# Patient Record
Sex: FEMALE | Race: ASIAN | Employment: OTHER | URBAN - METROPOLITAN AREA
[De-identification: names, ages, dates, MRNs, and addresses within clinical notes are randomized per-mention and may not be internally consistent; named-entity substitution may affect disease eponyms.]

---

## 2017-01-06 ENCOUNTER — TELEPHONE (OUTPATIENT)
Dept: RHEUMATOLOGY | Age: 82
End: 2017-01-06

## 2017-01-06 NOTE — TELEPHONE ENCOUNTER
Patient informed Julia Betts has been approved by [facility] from 1/2/2017-12/31/2017. Patient states she has received the Otrexup, and Forteo from [employer] today.

## 2017-01-19 ENCOUNTER — CLINICAL SUPPORT (OUTPATIENT)
Dept: RHEUMATOLOGY | Age: 82
End: 2017-01-19

## 2017-01-19 VITALS
OXYGEN SATURATION: 99 % | HEART RATE: 85 BPM | TEMPERATURE: 96.6 F | DIASTOLIC BLOOD PRESSURE: 79 MMHG | RESPIRATION RATE: 20 BRPM | SYSTOLIC BLOOD PRESSURE: 165 MMHG

## 2017-01-19 DIAGNOSIS — M81.0 OSTEOPOROSIS: ICD-10-CM

## 2017-01-19 DIAGNOSIS — M05.79 SEROPOSITIVE RHEUMATOID ARTHRITIS OF MULTIPLE SITES (HCC): Primary | ICD-10-CM

## 2017-01-19 NOTE — PROGRESS NOTES
Patient is here with Vicenta (Caregiver) for injection teaching for [employer], and Otrexup. Caregiver , and patient given instructions for Otrexup. Caregiver gave Otrexup in right side of abdomen subcutaneously Lot #[id number] [address]  [address] [phone] [id number], Forteo given in right thigh Control # [id number] exp 7-2018 [address] [id number]. No reaction after 15 minutes at either injection site. Patient has appointment in February. Will need labs.

## 2017-02-08 ENCOUNTER — TELEPHONE (OUTPATIENT)
Dept: CARDIOLOGY CLINIC | Age: 82
End: 2017-02-08

## 2017-02-08 DIAGNOSIS — E78.00 PURE HYPERCHOLESTEROLEMIA: Primary | ICD-10-CM

## 2017-02-08 RX ORDER — EVOLOCUMAB 140 MG/ML
140 INJECTION, SOLUTION SUBCUTANEOUS
Qty: 2 PEN | Refills: 11 | Status: SHIPPED | OUTPATIENT
Start: 2017-02-08 | End: 2017-02-16

## 2017-02-08 NOTE — TELEPHONE ENCOUNTER
All patient's information and notes were sent to [address] [place] [address] with RX for [place] in January. They needed current labs so I sent patient lab slip. Since Praluent is possibly not available, sending current lab results with Repatha prescription to pharmacy. They work on approval with insurance and send to patient if approved. Otherwise they call me if there is a problem.

## 2017-02-08 NOTE — TELEPHONE ENCOUNTER
----- Message from Rosalia Merritt MD sent at 2/8/2017  9:12 AM EST -----  Lipids bad with statin intolerance, Medicare, any chance of getting approval for Repatha?

## 2017-02-16 ENCOUNTER — OFFICE VISIT (OUTPATIENT)
Dept: RHEUMATOLOGY | Age: 82
End: 2017-02-16

## 2017-02-16 VITALS
OXYGEN SATURATION: 99 % | WEIGHT: 136 LBS | DIASTOLIC BLOOD PRESSURE: 65 MMHG | BODY MASS INDEX: 25.68 KG/M2 | HEART RATE: 86 BPM | TEMPERATURE: 96.8 F | RESPIRATION RATE: 22 BRPM | SYSTOLIC BLOOD PRESSURE: 137 MMHG | HEIGHT: 61 IN

## 2017-02-16 DIAGNOSIS — E55.9 VITAMIN D DEFICIENCY: ICD-10-CM

## 2017-02-16 DIAGNOSIS — M05.79 SEROPOSITIVE RHEUMATOID ARTHRITIS OF MULTIPLE SITES (HCC): Primary | ICD-10-CM

## 2017-02-16 DIAGNOSIS — M17.0 PRIMARY OSTEOARTHRITIS OF BOTH KNEES: ICD-10-CM

## 2017-02-16 DIAGNOSIS — Z79.60 LONG-TERM USE OF IMMUNOSUPPRESSANT MEDICATION: ICD-10-CM

## 2017-02-16 DIAGNOSIS — M81.0 OSTEOPOROSIS: ICD-10-CM

## 2017-02-16 DIAGNOSIS — E79.0 ASYMPTOMATIC HYPERURICEMIA: ICD-10-CM

## 2017-02-16 RX ORDER — DICLOFENAC SODIUM 10 MG/G
2 GEL TOPICAL 4 TIMES DAILY
Qty: 3 EACH | Refills: 3 | Status: SHIPPED | OUTPATIENT
Start: 2017-02-16 | End: 2017-03-18

## 2017-02-16 NOTE — PROGRESS NOTES
REASON FOR VISIT    This is a follow-up visit for Ms. Ledesma for Seropositive Erosive Nodular Rheumatoid Arthritis and Osteoporosis. Inflammatory arthritis phenotype includes:  Anti-CCP positive: yes (71)  Rheumatoid factor positive: yes (81.8)  Erosive disease: yes  Extra-articular manifestations include: rheumatoid nodules    Quantiferon TB: negative  PPD:  Not performed    Therapy History includes:     Current DMARD therapy includes: methotrexate 25 mg subcutaneous every Wednesday  Prior DMARD therapy includes: methotrexate 25 mg subcutaneous  The following DMARDs have been ineffective: none  The following DMARDs were stopped because of side effects: none    Osteoporosis Historical Synopsis     Height loss since age 29 (at least two inches): 1.5   Fracture history includes: yes (right radius and ulna, hairline in legs, L3, T10, T12 vertebral fractures)  Family history of hip fracture: no     Daily calcium intake is 1200 mg  Daily vitamin D intake is 5000 IU     Smoking history: no  Alcohol consumption: no  Prednisone history: no     Exercise: yes (walks around the house, core exercises)     Previous work-up for osteoporosis includes the following:  DEXA Scan: 11/23/2016  Vitamin 25OH D level: 54.9  PTH: 19     Therapy History includes:     Current osteoporosis therapy includes:  Forteo  Prior osteoporosis therapy includes: Fosmax, Berta Fail   The following osteoporosis have been ineffective: none  The following osteoporosis were stopped because of side effects: none     Immunizations:   Immunization History   Administered Date(s) Administered    Influenza High Dose Vaccine PF 11/01/2016    Influenza Vaccine Split 10/15/2010, 10/01/2011    Influenza Vaccine Whole 10/01/2004    Pneumococcal Vaccine (Unspecified Type) 06/29/2006       Active problems include:    Patient Active Problem List   Diagnosis Code    DM (diabetes mellitus) ([facility] E11.9    Hypothyroid E03.9    [facility] cancer ([facility] C18.9    H/O: CVA     Microalbuminuria R80.9    Osteoporosis M81.0    Essential hypertension I10    Anemia D64.9    Hyperlipidemia E78.5    Carotid bruit R09.89    Claudication ([facility]) I73.9    Weakness of left arm M62.81    PAD (peripheral artery disease) ([facility]) I73.9    Weakness due to cerebrovascular accident ([facility] I63.9, R53.1    Neuropathy in diabetes ([facility] E11.40    Occlusion and stenosis of carotid artery without mention of cerebral infarction I65.29    Seropositive rheumatoid arthritis of multiple sites ([facility]) M05.79    Primary osteoarthritis of both knees M17.0    Long-term use of immunosuppressant medication Z79.899    Statin intolerance Z78.9    Asymptomatic hyperuricemia E79.0       HISTORY OF PRESENT ILLNESS    Ms. Yelena Jesus returns for a follow-up visit. On her last visit, I started her on Forteo and continued her methotrexate 25 mg subcutaneous every Saturday (Otrexup), with good tolerance. Today, she complains of pain and stiffness that lasts hours     Her right knee is hurting it. She had been using Volataren at bedtime, which helps her sleep at night but not more than that. Ms. Yelena Jesus has continued her medications for arthritis (methotrexate) and reports good tolerance without significant side effects. Last toxicity monitoring by blood work was done on 12/19/2016 and did not reveal any significant adverse effects, creatinine 1.03 mg/dL, eGFR 52. Most recent inflammatory markers from 12/19/2016 revealed a ESR 18 mm/hr (previously 6, 12 mm/hr) and CRP 1.5 mg/dL (previously 0.7, 1.4 mg/L). The patient has not had any interval hospital admissions, infections, or surgeries. REVIEW OF SYSTEMS    A comprehensive review of systems was performed and pertinent results are documented in the HPI, review of systems is otherwise non-contributory. PAST MEDICAL HISTORY    She has a past medical history of Anemia (9/2/2009); Colon cancer ([facility] (9/2/2009);  Colon cancer ([facility] (9/2/2009); DM (diabetes mellitus) ([facility] (9/2/2009); GERD (gastroesophageal reflux disease); H/O: CVA (9/2/2009); Hypothyroid (9/2/2009); Ill-defined condition; Microalbuminuria (9/2/2009); Osteoporosis (9/2/2009); Other and unspecified hyperlipidemia (1/27/2010); Rheumatoid arthritis involving ankle ([facility] (9/28/2016); Rheumatoid arthritis(714.0) (9/2/2009); and Unspecified essential hypertension (9/2/2009). She also has no past medical history of Abuse; Adult physical abuse; Arrhythmia; Asthma; CAD (coronary artery disease); Calculus of kidney; Chronic kidney disease; Chronic pain; Congestive heart failure, unspecified; Contact dermatitis and other eczema, due to unspecified cause; COPD; Depression; Headache(784.0); Liver disease; Psychotic disorder; PUD (peptic ulcer disease); Seizures ([facility] Stroke [facility]); Thromboembolus ([facility] Unspecified adverse effect of anesthesia; or Unspecified sleep apnea. FAMILY HISTORY    Her family history includes Cancer in her father; Diabetes in her brother, mother, sister, and sister; Other in her sister; Stroke in her mother. SOCIAL HISTORY    She reports that she has never smoked. She has never used smokeless tobacco. She reports that she does not drink alcohol or use illicit drugs. MEDICATIONS    Current Outpatient Prescriptions   Medication Sig Dispense Refill    diclofenac (VOLTAREN) 1 % gel Apply 2 g to affected area four (4) times daily for 30 days. 3 Each 3    abatacept (ORENCIA) 125 mg/mL syrg 1 mL by SubCUTAneous route every seven (7) days for 90 days. 12 Syringe 3    varicella zoster vacine live (VARICELLA-ZOSTER VACINE LIVE) 19,400 unit/0.65 mL susr injection 1 Vial by SubCUTAneous route once for 1 dose. 0.65 mL 0    methotrexate, PF, (OTREXUP, PF,) 25 mg/0.4 mL atIn 25 mg by SubCUTAneous route every seven (7) days. 4 Pen 11    teriparatide (FORTEO) 20 mcg/dose - 600 mcg/2.4 mL pnij injection 0.08 mL by SubCUTAneous route daily.  2.4 mL 6    carvedilol (COREG) 25 mg tablet Take 25 mg by mouth two (2) times a day.  potassium chloride SR (KLOR-CON 10) 10 mEq tablet Take 20 mEq by mouth daily.  amLODIPine (NORVASC) 5 mg tablet Take 5 mg by mouth daily.  MONOJECT TB SAFETY SYRINGE 1 mL 25 gauge x 5/8\" syrg Use 1 syringe with methotrexate as directed. 12 Syringe 3    levothyroxine (SYNTHROID) 112 mcg tablet Take  by mouth Daily (before breakfast).  clopidogrel (PLAVIX) 75 mg tablet Take 1 Tab by mouth daily. 90 Tab 3    chlorthalidone (HYGROTEN) 50 mg tablet Take 50 mg by mouth daily.  sitaGLIPtin (JANUVIA) 25 mg tablet Take 50 mg by mouth daily.  CINNAMON BARK (CINNAMON PO) Take 250 mg by mouth daily.  OTHER L-citrulline malate complex 750mg. Takes one po daily.  VIT C/VIT E/LUTEIN/MIN/OMEGA-3 (OCUVITE PO) Take  by mouth. Takes one po daily.  MAGNESIUM MALATE Take 200 mg by mouth daily.  MULTIVITAMIN WITH MINERALS (HAIR,SKIN & NAILS PO) Take  by mouth. Takes one po daily.  lisinopril (PRINIVIL, ZESTRIL) 40 mg tablet Take 40 mg by mouth daily.  aspirin delayed-release 81 mg tablet Take 81 mg by mouth daily.  metFORMIN (GLUCOPHAGE) 1,000 mg tablet Take 1 Tab by mouth two (2) times daily (with meals). 180 Tab 1    LECITHIN PO Take 800 mg by mouth two (2) times a day.  OMEGA-3 FATTY ACIDS/FISH OIL (OMEGA 3 FISH OIL PO) Take 300 mg by mouth daily.  ascorbate calcium (MAKAYLA-C) 500 mg Tab Take 1,000 mg by mouth daily.  CHOLECALCIFEROL, VITAMIN D3, (VITAMIN D-3 PO) Take 10,000 Units by mouth daily.  alirocumab (PRALUENT PEN) 75 mg/mL injector pen 1 mL by SubCUTAneous route Once every 2 weeks.  2 Syringe 11        ALLERGIES    Allergies   Allergen Reactions    Statins-Hmg-Coa Reductase Inhibitors Other (comments)     Intolerant to statins     Sulfa (Sulfonamide Antibiotics) Other (comments)     syncope       PHYSICAL EXAMINATION    Visit Vitals    /65 (BP 1 Location: Left arm, BP Patient Position: Sitting)    Pulse 86    Temp 96.8 °F (36 °C) (Oral)    Resp 22    Ht 5' 1\" (1.549 m)    Wt 136 lb (61.7 kg)    SpO2 99%    BMI 25.7 kg/m2     Body mass index is 25.7 kg/(m^2). General: Patient is alert, oriented x 3, not in acute distress, son at bedside    HEENT:   Sclerae are not injected and appear moist.  Oral mucous membranes are moist, there are no ulcers present. There is no alopecia. Cardiovascular:  Heart is regular rate and rhythm, no murmurs. Chest:  Lungs are clear to auscultation bilaterally. No rhonchi, wheezes, or crackles. Extremities:  Free of clubbing, cyanosis, edema    Neurological exam:  No focal sensory deficits, muscle strength is full in upper and lower extremities     Skin exam:  There are no rashes, no alopecia, no discoid lesions, no active Raynaud's, no livedo reticularis, no periungual erythema. Right thumb nodule    Musculoskeletal exam:  A comprehensive musculoskeletal exam was performed for all joints of each upper and lower extremity and assessed for swelling, tenderness and range of motion.  Positive results are documented as below:    Bilateral ankle tenderness  Bilateral MTP tenderness  Bilateral knee crepitus without effusion     Joint Count 2/16/2017 12/19/2016 11/21/2016 10/13/2016 9/28/2016   Patient pain (0-100) 60 30 50 10 5   MHAQ 2 1.38 1.63 0.63 1   Left elbow - Tender 1 - - - -   Left elbow - Swollen 1 - - - -   Left wrist- Tender - - 1 - 1   Left wrist- Swollen 1 - 1 - 1   Left 1st MCP - Tender - - 1 - -   Left 1st MCP - Swollen 1 1 1 - -   Left 2nd MCP - Tender - - 1 - -   Left 2nd MCP - Swollen - - 1 - -   Left 3rd MCP - Tender - 1 1 - 1   Left 3rd MCP - Swollen 1 1 1 - 1   Left 4th MCP - Tender - - 1 - 1   Left 4th MCP - Swollen - - 1 - 1   Left 5th MCP - Tender - - 1 - 1   Left 5th MCP - Swollen - - 1 - 1   Left thumb IP - Tender - - 1 - -   Left thumb IP - Swollen - - 1 - -   Left 2nd PIP - Tender - - 1 - -   Left 2nd PIP - Swollen - - 1 - -   Left 3rd PIP - Tender 1 - 1 - -   Left 3rd PIP - Swollen - - 1 - -   Left 4th PIP - Tender - - 1 - -   Left 5th PIP - Tender - - 1 - -   Right elbow - Tender 1 - - - -   Right elbow - Swollen 1 - - - 1   Right wrist- Tender 1 1 1 - 1   Right wrist- Swollen 1 1 1 - 1   Right 1st MCP - Tender 1 1 1 - 1   Right 1st MCP - Swollen 1 1 1 - 1   Right 2nd MCP - Tender - - 1 - 1   Right 2nd MCP - Swollen - - 1 - 1   Right 3rd MCP - Tender 1 1 1 - 1   Right 3rd MCP - Swollen 1 1 1 - 1   Right 4th MCP - Tender - 1 1 - -   Right 4th MCP - Swollen - 1 1 - -   Right 5th MCP - Tender 1 1 1 - 1   Right 5th MCP - Swollen 1 1 - - 1   Right thumb IP - Tender - 1 - - -   Right 2nd PIP - Tender 1 - 1 - -   Right 2nd PIP - Swollen 1 - 1 - -   Right 3rd PIP - Tender 1 - 1 - -   Right 4th PIP - Tender 1 - 1 - -   Right 5th PIP - Tender 1 - 1 - -   Tender Joint Count (Total) 11 7 - - -   Swollen Joint Count (Total) 10 7 - - -   Physician Assessment (0-10) 3 3 3 - 4   Patient Assessment (0-10) 5.5 6 8 0 0   CDAI Total (calculated) 29.5 23 - - -        DATA REVIEW    Laboratory     The following laboratory results were reviewed and discussed with the patient:    Telephone on 12/28/2016   Component Date Value    Cholesterol, total 02/07/2017 206*    Triglyceride 02/07/2017 209*    HDL Cholesterol 02/07/2017 36*    VLDL, calculated 02/07/2017 42*    LDL, calculated 02/07/2017 128*    INTERPRETATION 02/07/2017 Note    Office Visit on 12/19/2016   Component Date Value    PTH, Intact 12/19/2016 19     C-Reactive Protein, Qt 12/19/2016 1.5     Sed rate (ESR) 12/19/2016 18     Glucose 12/19/2016 113*    BUN 12/19/2016 25     Creatinine 12/19/2016 1.03*    GFR est non-AA 12/19/2016 52*    GFR est AA 12/19/2016 60     BUN/Creatinine ratio 12/19/2016 24     Sodium 12/19/2016 143     Potassium 12/19/2016 3.7     Chloride 12/19/2016 100     CO2 12/19/2016 26     Calcium 12/19/2016 10.5*    Protein, total 12/19/2016 7.0     Albumin 12/19/2016 4.3  GLOBULIN, TOTAL 12/19/2016 2.7     A-G Ratio 12/19/2016 1.6     Bilirubin, total 12/19/2016 0.6     Alk. phosphatase 12/19/2016 86     AST (SGOT) 12/19/2016 24     ALT (SGPT) 12/19/2016 31     WBC 12/19/2016 8.6     RBC 12/19/2016 3.78     HGB 12/19/2016 11.3     HCT 12/19/2016 34.6     MCV 12/19/2016 92     MCH 12/19/2016 29.9     MCHC 12/19/2016 32.7     RDW 12/19/2016 16.8*    PLATELET 69/99/5592 407     NEUTROPHILS 12/19/2016 60     Lymphocytes 12/19/2016 25     MONOCYTES 12/19/2016 12     EOSINOPHILS 12/19/2016 2     BASOPHILS 12/19/2016 1     ABS. NEUTROPHILS 12/19/2016 5.2     Abs Lymphocytes 12/19/2016 2.1     ABS. MONOCYTES 12/19/2016 1.0*    ABS. EOSINOPHILS 12/19/2016 0.2     ABS. BASOPHILS 12/19/2016 0.0     IMMATURE GRANULOCYTES 12/19/2016 0     ABS. IMM. GRANS. 12/19/2016 0.0     Hematology comments: 12/19/2016 Note:    Office Visit on 11/21/2016   Component Date Value    WBC 11/21/2016 7.2     RBC 11/21/2016 4.12     HGB 11/21/2016 12.1     HCT 11/21/2016 36.3     MCV 11/21/2016 88     [address] 11/21/2016 29.4     MCHC 11/21/2016 33.3     RDW 11/21/2016 16.8*    PLATELET 75/89/0898 740     NEUTROPHILS 11/21/2016 50     Lymphocytes 11/21/2016 36     MONOCYTES 11/21/2016 10     EOSINOPHILS 11/21/2016 2     BASOPHILS 11/21/2016 1     ABS. NEUTROPHILS 11/21/2016 3.6     Abs Lymphocytes 11/21/2016 2.6     ABS. MONOCYTES 11/21/2016 0.8     ABS. EOSINOPHILS 11/21/2016 0.1     ABS. BASOPHILS 11/21/2016 0.1     IMMATURE GRANULOCYTES 11/21/2016 1     ABS. IMM.  GRANS. 11/21/2016 0.1     Glucose 11/21/2016 133*    BUN 11/21/2016 22     Creatinine 11/21/2016 0.97     GFR est non-AA 11/21/2016 56*    GFR est AA 11/21/2016 64     BUN/Creatinine ratio 11/21/2016 23     Sodium 11/21/2016 138     Potassium 11/21/2016 3.9     Chloride 11/21/2016 96*    CO2 11/21/2016 25     Calcium 11/21/2016 10.4*    Protein, total 11/21/2016 7.1     Albumin 11/21/2016 4.4  GLOBULIN, TOTAL 11/21/2016 2.7     A-G Ratio 11/21/2016 1.6     Bilirubin, total 11/21/2016 0.5     Alk. phosphatase 11/21/2016 91     AST (SGOT) 11/21/2016 24     ALT (SGPT) 11/21/2016 33*    Sed rate (ESR) 11/21/2016 6     C-Reactive Protein, Qt 11/21/2016 0.7        Imaging    Musculoskeletal Ultrasound    None    Radiographs    Chest 9/28/2016: normal heart size. Is atherosclerotic change of the aorta. The lungs are clear acute process. There is calcified granuloma in the right upper lobe with calcified right hilar lymph nodes. There is moderate compression deformity of approximately the T12 vertebral body that appears chronic. There are degenerative changes of the thoracic spine. Bilateral Hand 9/28/2016: LEFT: Bones are osteopenic. No acute fracture or dislocation. Moderate radiocarpal joint osteoarthritis. Age-appropriate intercarpal and first [facility] osteoarthritis. There are erosions at the third MCP joint. No other erosion. No other joint space narrowing. No soft tissue calcification.  RIGHT: No acute fracture. Old radius intra-articular fracture has healed. Increased now moderate radiocarpal joint osteoarthritis. Increased mild first MCP joint osteoarthritis. Increased mild-moderate first IP joint osteoarthritis. No joint space erosion or periosteal reaction. Alignment is within normal limits. Osteopenia is unchanged. No soft tissue calcification. Bilateral Foot 9/28/2016: LEFT: Bones are osteopenic. No acute fracture or dislocation. No erosion. Age-appropriate bilateral first MTP joint osteoarthritis. Minimal intertarsal osteoarthritis for age. No periosteal reaction or soft tissue calcification. RIGHT: Bones are osteopenic. No acute fracture or dislocation. No erosion. Age-appropriate bilateral first MTP joint osteoarthritis. Minimal intertarsal osteoarthritis for age. No periosteal reaction or soft tissue calcification.     Right Hand 4/10/2010: showed demonstrate osteopenia. Mary Bolton is an acute T-shaped intra-articular distal radial fracture. Ulna styloid peri like avulsion is also noted. There is soft tissue swelling    CT Imaging    None    MR Imaging    None    DXA     DXA 11/23/2016: (L3 for compression fracture and L4 for spondylitic change) lumbar spine L1-L2 T score -1.7 (BMD 0.971 g/cm2), right femoral neck T score: -2.0 (0.757 g/cm2), righttotal hip T score: -1.7 (0.791 g/cm2), and distal one third left radius T score -4.6 (0.472 g/cm2). FRAX score 33.2 % probability in 10 years for major osteoporotic fracture and 21 % 10 year probability of hip fracture. ASSESSMENT AND PLAN    This is a follow-up visit for Ms. Ledesma. 1) Seropositive Erosive Nodular Rheumatoid Arthritis. She has tolerated methotrexate (Otrexup) 25 mg every Wednesday well without adverse effects and has continued folic acid. Her CDAI on was 29.5 (previously 23, 47, 23) with 11 tender and 10 swollen joints. I discussed with her advancing therapy to the biologic (small particle, anti-TNF, anti-CTL4A). We discussed the potential adverse effects, which include infections, and routes of administration (oral versus infusion versus subcutaneous). The patient was informed these medications co-pay are subject to the patient's insurance coverage and we will not know until it has been submitted to the insurance company. She preferred Orencia subcutaneously. A prior authorization will be submitted today. 2) Long Term Use of Immunosuppressants. The patient remains on immunomodulatory medications (methotrexate) and requires frequent toxicity monitoring by blood work. Respective labs were ordered (CBC and CMP). 3) Osteoporosis with Multiple Pathologic Fractures. She is taking calcium 1200 mg and 5000 UI of vitamin D daily. She is on Forteo with good tolerance.    4) Bilateral Knee Osteoarthritis. This continues to be an active issue. I asked her to applied diclofenac gel at least 3 times daily. 5) Abnormal Gait.  She is dependent on a walker to ambulate. There are no recent falls. 6) Asymptomatic Hyperuricemia. Her uric acid was 7.2 mg/dL. There is no history of gout. I will repeat it today. bI gave her a script for the Zostavax. The patient voiced understanding of the aforementioned assessment and plan. Summary of plan was provided in the After Visit Summary patient instructions.      TODAY'S ORDERS    Orders Placed This Encounter    CBC WITH AUTOMATED DIFF    METABOLIC PANEL, COMPREHENSIVE    C REACTIVE PROTEIN, QT    SED RATE (ESR)    VITAMIN D, 25 HYDROXY    URIC ACID    diclofenac (VOLTAREN) 1 % gel    abatacept (ORENCIA) 125 mg/mL syrg    varicella zoster vacine live (VARICELLA-ZOSTER VACINE LIVE) 19,400 unit/0.65 mL susr injection     Future Appointments  Date Time Provider Prasanna Diana   2/17/2017 11:00 AM VASCULAR, [place] [address]   5/12/2017 11:40 AM MD Dez Paulino MD, [address] [facility]    Adult Rheumatology   Musculoskeletal Ultrasound Certified  [address] [place] [address]   [id number] [place] [address]   Phone [phone]  Fax [phone]

## 2017-02-16 NOTE — MR AVS SNAPSHOT
Visit Information Date & Time Provider Department Dept. Phone Encounter #  
 2/16/2017  3:40 PM Mari Barkley MD [facility] [id number] Follow-up Instructions Return in about 3 months (around 5/16/2017). Your Appointments 2/17/2017 11:00 AM  
VASCULAR TEST with [facility] ([facility] SCHEDULING) Appt Note: Per Dr. Calvin Chris Carotid Duplex dx carotid disease, hx stroke [address]  [address] [place] [address]  
[address] [id number] Upcoming Health Maintenance Date Due  
 FOOT EXAM Q1 2/21/1945 EYE EXAM RETINAL OR DILATED Q1 2/21/1945 DTaP/Tdap/Td series (1 - Tdap) 2/21/1958 ZOSTER VACCINE AGE 60> 2/21/1997 GLAUCOMA SCREENING Q2Y 2/21/2002 MEDICARE YEARLY EXAM 2/21/2002 Pneumococcal 65+ High/Highest Risk (2 of 2 - PPSV23) 6/29/2011 BREAST CANCER SCRN MAMMOGRAM 5/19/2012 HEMOGLOBIN A1C Q6M 2/5/2014 COLONOSCOPY 4/20/2016 MICROALBUMIN Q1 3/17/2017 LIPID PANEL Q1 2/7/2018 Allergies as of 2/16/2017  Review Complete On: 2/16/2017 By: Mari Barkley MD  
  
 Severity Noted Reaction Type Reactions Statins-hmg-coa Reductase Inhibitors  12/21/2016    Other (comments) Intolerant to statins Sulfa (Sulfonamide Antibiotics)  09/02/2009    Other (comments)  
 syncope Current Immunizations  Reviewed on 11/21/2016 Name Date Influenza High Dose Vaccine PF 11/1/2016 Influenza Vaccine Split 10/1/2011, 10/15/2010 Influenza Vaccine Whole 10/1/2004 Pneumococcal Vaccine (Unspecified Type) 6/29/2006 Not reviewed this visit You Were Diagnosed With   
  
 Codes Comments Seropositive rheumatoid arthritis of multiple sites [facility])    -  Primary ICD-10-CM: M05.79 ICD-9-CM: 714.0 Long-term use of immunosuppressant medication     ICD-10-CM: Z79.899 ICD-9-CM: V58.69 Primary osteoarthritis of both knees     ICD-10-CM: M17.0 ICD-9-CM: 715.16 Asymptomatic hyperuricemia     ICD-10-CM: E79.0 ICD-9-CM: 790.6 Osteoporosis     ICD-10-CM: M81.0 ICD-9-CM: 733.00 Vitamin D deficiency     ICD-10-CM: E55.9 ICD-9-CM: 268.9 Vitals BP Pulse Temp Resp Height(growth percentile) Weight(growth percentile)  
 137/65 (BP 1 Location: Left arm, BP Patient Position: Sitting) 86 96.8 °F (36 °C) (Oral) 22 5' 1\" (1.549 m) 136 lb (61.7 kg) SpO2 BMI OB Status Smoking Status 99% 25.7 kg/m2 Hysterectomy Never Smoker Vitals History BMI and BSA Data Body Mass Index Body Surface Area 25.7 kg/m 2 1.63 m 2 Preferred Pharmacy Pharmacy Name Phone [facility] [address] [place] [address] [phone] Your Updated Medication List  
  
   
This list is accurate as of: 17  4:26 PM.  Always use your most recent med list.  
  
  
  
  
 abatacept 125 mg/mL Syrg Commonly known as:  Izell Apgar 1 mL by SubCUTAneous route every seven (7) days for 90 days. alirocumab 75 mg/mL injector pen Commonly known as:  PRALUENT PEN  
1 mL by SubCUTAneous route Once every 2 weeks. amLODIPine 5 mg tablet Commonly known as:  Arlyss [place] Take 5 mg by mouth daily. aspirin delayed-release 81 mg tablet Take 81 mg by mouth daily. carvedilol 25 mg tablet Commonly known as:  Watkins Servant Take 25 mg by mouth two (2) times a day. chlorthalidone 50 mg tablet Commonly known as:  Cecilia [place] Take 50 mg by mouth daily. CINNAMON PO Take 250 mg by mouth daily. clopidogrel 75 mg Tab Commonly known as:  PLAVIX Take 1 Tab by mouth daily. diclofenac 1 % Gel Commonly known as:  VOLTAREN Apply 2 g to affected area four (4) times daily for 30 days. MAKAYLA-C 500 mg Tab Generic drug:  ascorbate calcium Take 1,000 mg by mouth daily.   
  
 HAIR,SKIN & NAILS PO  
 Take  by mouth. Takes one po daily. JANUVIA 25 mg tablet Generic drug:  SITagliptin Take 50 mg by mouth daily. LECITHIN PO Take 800 mg by mouth two (2) times a day. levothyroxine 112 mcg tablet Commonly known as:  SYNTHROID Take  by mouth Daily (before breakfast). lisinopril 40 mg tablet Commonly known as:  Marilynne Shows Take 40 mg by mouth daily. MAGNESIUM MALATE Take 200 mg by mouth daily. metFORMIN 1,000 mg tablet Commonly known as:  GLUCOPHAGE Take 1 Tab by mouth two (2) times daily (with meals). methotrexate (PF) 25 mg/0.4 mL Atin Commonly known as:  OTREXUP (PF)  
25 mg by SubCUTAneous route every seven (7) days. MONOJECT TB SAFETY SYRINGE 1 mL 25 gauge x 5/8\" Syrg Generic drug:  Syringe with Needle (Disp) Use 1 syringe with methotrexate as directed. OCUVITE PO Take  by mouth. Takes one po daily. OMEGA 3 FISH OIL PO Take 300 mg by mouth daily. OTHER  
L-citrulline malate complex 750mg. Takes one po daily. potassium chloride SR 10 mEq tablet Commonly known as:  KLOR-CON 10 Take 20 mEq by mouth daily. teriparatide 20 mcg/dose - 600 mcg/2.4 mL Pnij injection Commonly known as:  FORTEO  
0.08 mL by SubCUTAneous route daily. VITAMIN D3 PO Take 10,000 Units by mouth daily. Prescriptions Sent to Pharmacy Refills  
 diclofenac (VOLTAREN) 1 % gel 3 Sig: Apply 2 g to affected area four (4) times daily for 30 days. Class: Normal  
 Pharmacy: [address] Ph #: [phone] Route: Topical  
 abatacept (ORENCIA) 125 mg/mL syrg 3 Si mL by SubCUTAneous route every seven (7) days for 90 days. Class: Normal  
 Pharmacy: [address] Ph #: [phone] Route: SubCUTAneous We Performed the Following C REACTIVE PROTEIN, QT [20836 CPT(R)] CBC WITH AUTOMATED DIFF [02679 CPT(R)] METABOLIC PANEL, COMPREHENSIVE [03468 CPT(R)] SED RATE (ESR) [id number] CPT(R)] URIC ACID [id number] CPT(R)] VITAMIN D, 25 HYDROXY [id number] CPT(R)] Follow-up Instructions Return in about 3 months (around 5/16/2017). Patient Instructions Labs today I will start you on Orencia injections Introducing [facility]! Corey Calero introduces [employer] patient portal. Now you can access parts of your medical record, email your doctor's office, and request medication refills online. 1. In your internet browser, go to [email or web address] 2. Click on the First Time User? Click Here link in the Sign In box. You will see the New Member Sign Up page. 3. Enter your [employer] Access Code exactly as it appears below. You will not need to use this code after youve completed the sign-up process. If you do not sign up before the expiration date, you must request a new code. · [employer] Access Code: [email or web address] Expires: 4/19/2017  4:29 PM 
 
4. Enter the last four digits of your Social Security Number (xxxx) and Date of Birth (mm/dd/yyyy) as indicated and click Submit. You will be taken to the next sign-up page. 5. Create a [employer] ID. This will be your [employer] login ID and cannot be changed, so think of one that is secure and easy to remember. 6. Create a [employer] password. You can change your password at any time. 7. Enter your Password Reset Question and Answer. This can be used at a later time if you forget your password. 8. Enter your e-mail address. You will receive e-mail notification when new information is available in [address] 9. Click Sign Up. You can now view and download portions of your medical record. 10. Click the Download Summary menu link to download a portable copy of your medical information.  
 
If you have questions, please visit the Frequently Asked Questions section of the [employer]. Remember, [employer] is NOT to be used for urgent needs. For medical emergencies, dial 911. Now available from your iPhone and Android! Please provide this summary of care documentation to your next provider. Your primary care clinician is listed as Anthony Turk. If you have any questions after today's visit, please call [phone].

## 2017-02-16 NOTE — PROGRESS NOTES
REASON FOR VISIT    This is a follow-up visit for Ms. Ledesma for Seropositive Erosive Nodular Rheumatoid Arthritis. Inflammatory arthritis phenotype includes:  Anti-CCP positive: yes (71)  Rheumatoid factor positive: yes (81.8)  Erosive disease: yes  Extra-articular manifestations include: rheumatoid nodules    Quantiferon TB: negative  PPD:  Not performed    Therapy History includes:     Current DMARD therapy includes: methotrexate 25 mg subcutaneous every Wednesday  Prior DMARD therapy includes: methotrexate 25 mg subcutaneous  The following DMARDs have been ineffective: none  The following DMARDs were stopped because of side effects: none    Osteoporosis Historical Synopsis     Height loss since age 29 (at least two inches): 1.5   Fracture history includes: yes (right radius and ulna, hairline in legs, L3, T10, T12 vertebral fractures)  Family history of hip fracture: no     Daily calcium intake is 1200 mg  Daily vitamin D intake is 5000 IU     Smoking history: no  Alcohol consumption: no  Prednisone history: no     Exercise: yes (walks around the house, core exercises)     Previous work-up for osteoporosis includes the following:  DEXA Scan: 11/23/2016  Vitamin 25OH D level: 54.9  PTH: 19     Therapy History includes:     Current osteoporosis therapy includes:  Forteo  Prior osteoporosis therapy includes: Fosmax, Blenda Lesser   The following osteoporosis have been ineffective: none  The following osteoporosis were stopped because of side effects: none     Immunizations:   Immunization History   Administered Date(s) Administered    Influenza High Dose Vaccine PF 11/01/2016    Influenza Vaccine Split 10/15/2010, 10/01/2011    Influenza Vaccine Whole 10/01/2004    Pneumococcal Vaccine (Unspecified Type) 06/29/2006       Active problems include:    Patient Active Problem List   Diagnosis Code    DM (diabetes mellitus) ([facility] E11.9    Hypothyroid E03.9    [facility] C18.9    H/O: CVA     Microalbuminuria R80.9    Osteoporosis M81.0    Essential hypertension I10    Anemia D64.9    Hyperlipidemia E78.5    Carotid bruit R09.89    Claudication ([facility]) I73.9    Weakness of left arm M62.81    PAD (peripheral artery disease) ([facility]) I73.9    Weakness due to cerebrovascular accident ([facility] I63.9, R53.1    Neuropathy in diabetes ([facility] E11.40    Occlusion and stenosis of carotid artery without mention of cerebral infarction I65.29    Seropositive rheumatoid arthritis of multiple sites ([facility]) M05.79    Primary osteoarthritis of both knees M17.0    Long-term use of immunosuppressant medication Z79.899    Statin intolerance Z78.9       HISTORY OF PRESENT ILLNESS    Ms. Yelena Jesus returns for a follow-up visit for inflammatory arthritis. On her last visit, I started her on Forteo and continued her methotrexate 25 mg subcutaneous every Saturday. She has been tolerating methotrexate but was told by pharmacy that it is on backorder. She has not gone to physical therapy yet. She has some pain in her hands associated with stiffness. She also reports knee pain. Ms. Yelena Jesus has continued her medications for arthritis (methotrexate) and reports good tolerance without significant side effects. Last toxicity monitoring by blood work was done on 12/19/2016 and did not reveal any significant adverse effects, creatinine 1.03 mg/dL, eGFR 52. Most recent inflammatory markers from 12/19/2016 revealed a ESR 18 mm/hr (previously 6, 12 mm/hr) and CRP 1.5 mg/dL (previously 0.7, 1.4 mg/L). The patient has not had any interval hospital admissions, infections, or surgeries. REVIEW OF SYSTEMS    A comprehensive review of systems was performed and pertinent results are documented in the HPI, review of systems is otherwise non-contributory. PAST MEDICAL HISTORY    She has a past medical history of Anemia (9/2/2009); Colon cancer ([facility] (9/2/2009);  Colon cancer ([facility] (9/2/2009); DM (diabetes mellitus) ([facility] (9/2/2009); GERD (gastroesophageal reflux disease); H/O: CVA (9/2/2009); Hypothyroid (9/2/2009); Ill-defined condition; Microalbuminuria (9/2/2009); Osteoporosis (9/2/2009); Other and unspecified hyperlipidemia (1/27/2010); Rheumatoid arthritis involving ankle ([facility] (9/28/2016); Rheumatoid arthritis(714.0) (9/2/2009); and Unspecified essential hypertension (9/2/2009). She also has no past medical history of Abuse; Adult physical abuse; Arrhythmia; Asthma; CAD (coronary artery disease); Calculus of kidney; Chronic kidney disease; Chronic pain; Congestive heart failure, unspecified; Contact dermatitis and other eczema, due to unspecified cause; COPD; Depression; Headache(784.0); Liver disease; Psychotic disorder; PUD (peptic ulcer disease); Seizures ([facility] Stroke [facility]); Throm[facility]embolus [facility] Unspecified adverse effect of anesthesia; or Unspecified sleep apnea. FAMILY HISTORY    Her family history includes Cancer in her father; Diabetes in her brother, mother, sister, and sister; Other in her sister; Stroke in her mother. SOCIAL HISTORY    She reports that she has never smoked. She has never used smokeless tobacco. She reports that she does not drink alcohol or use illicit drugs. MEDICATIONS    Current Outpatient Prescriptions   Medication Sig Dispense Refill    REPATHA SURECLICK pen injection 1 mL by SubCUTAneous route every fourteen (14) days. 2 Pen 11    alirocumab (PRALUENT PEN) 75 mg/mL injector pen 1 mL by SubCUTAneous route Once every 2 weeks. 2 Syringe 11    methotrexate, PF, (OTREXUP, PF,) 25 mg/0.4 mL atIn 25 mg by SubCUTAneous route every seven (7) days. 4 Pen 11    teriparatide (FORTEO) 20 mcg/dose - 600 mcg/2.4 mL pnij injection 0.08 mL by SubCUTAneous route daily. 2.4 mL 6    carvedilol (COREG) 25 mg tablet Take 25 mg by mouth two (2) times a day.  potassium chloride SR (KLOR-CON 10) 10 mEq tablet Take 20 mEq by mouth daily.  amLODIPine (NORVASC) 5 mg tablet Take 5 mg by mouth daily.       MONOJECT TB SAFETY SYRINGE 1 mL 25 gauge x 5/8\" syrg Use 1 syringe with methotrexate as directed. 12 Syringe 3    levothyroxine (SYNTHROID) 112 mcg tablet Take  by mouth Daily (before breakfast).  diclofenac (VOLTAREN) 1 % gel Apply  to affected area four (4) times daily.  clopidogrel (PLAVIX) 75 mg tablet Take 1 Tab by mouth daily. 90 Tab 3    chlorthalidone (HYGROTEN) 50 mg tablet Take 50 mg by mouth daily.  sitaGLIPtin (JANUVIA) 25 mg tablet Take 50 mg by mouth daily.  CINNAMON BARK (CINNAMON PO) Take 250 mg by mouth daily.  OTHER L-citrulline malate complex 750mg. Takes one po daily.  VIT C/VIT E/LUTEIN/MIN/OMEGA-3 (OCUVITE PO) Take  by mouth. Takes one po daily.  MAGNESIUM MALATE Take 200 mg by mouth daily.  MULTIVITAMIN WITH MINERALS (HAIR,SKIN & NAILS PO) Take  by mouth. Takes one po daily.  lisinopril (PRINIVIL, ZESTRIL) 40 mg tablet Take 40 mg by mouth daily.  aspirin delayed-release 81 mg tablet Take 162 mg by mouth daily.  metFORMIN (GLUCOPHAGE) 1,000 mg tablet Take 1 Tab by mouth two (2) times daily (with meals). 180 Tab 1    LECITHIN PO Take 800 mg by mouth two (2) times a day.  OMEGA-3 FATTY ACIDS/FISH OIL (OMEGA 3 FISH OIL PO) Take 300 mg by mouth daily.  ascorbate calcium (MAKAYLA-C) 500 mg Tab Take 1,000 mg by mouth daily.  CHOLECALCIFEROL, VITAMIN D3, (VITAMIN D-3 PO) Take 10,000 Units by mouth daily. ALLERGIES    Allergies   Allergen Reactions    Statins-Hmg-Coa Reductase Inhibitors Other (comments)     Intolerant to statins     Sulfa (Sulfonamide Antibiotics) Other (comments)     syncope       PHYSICAL EXAMINATION    There were no vitals taken for this visit. There is no height or weight on file to calculate BMI. General: Patient is alert, oriented x 3, not in acute distress, son at bedside    HEENT:   Sclerae are not injected and appear moist.  Oral mucous membranes are moist, there are no ulcers present.   There is no alopecia. Neck is supple, there is no lymphadenopathy. Thyroid is not enlarged. Cardiovascular:  Heart is regular rate and rhythm, no murmurs, rubs, or gallops    Chest:  Lungs are clear to auscultation bilaterally. No rhonchi, wheezes, or crackles. Abdomen:  Soft, non-tender. Extremities:  Free of clubbing, cyanosis, edema    Neurological exam:  No focal sensory deficits, muscle strength is full in upper and lower extremities     Skin exam:  There are no rashes, no alopecia, no discoid lesions, no active Raynaud's, no livedo reticularis, no periungual erythema. Right thumb nodule    Musculoskeletal exam:  A comprehensive musculoskeletal exam was performed for all joints of each upper and lower extremity and assessed for swelling, tenderness and range of motion.  Positive results are documented as below:    Bilateral ankle tenderness  Bilateral MTP tenderness  Bilateral knee crepitus without effusion     Joint Count 12/19/2016 11/21/2016 10/13/2016 9/28/2016   Patient pain (0-100) 30 50 10 5   MHAQ 1.38 1.63 0.63 1   Left wrist- Tender - 1 - 1   Left wrist- Swollen - 1 - 1   Left 1st MCP - Tender - 1 - -   Left 1st MCP - Swollen 1 1 - -   Left 2nd MCP - Tender - 1 - -   Left 2nd MCP - Swollen - 1 - -   Left 3rd MCP - Tender 1 1 - 1   Left 3rd MCP - Swollen 1 1 - 1   Left 4th MCP - Tender - 1 - 1   Left 4th MCP - Swollen - 1 - 1   Left 5th MCP - Tender - 1 - 1   Left 5th MCP - Swollen - 1 - 1   Left thumb IP - Tender - 1 - -   Left thumb IP - Swollen - 1 - -   Left 2nd PIP - Tender - 1 - -   Left 2nd PIP - Swollen - 1 - -   Left 3rd PIP - Tender - 1 - -   Left 3rd PIP - Swollen - 1 - -   Left 4th PIP - Tender - 1 - -   Left 5th PIP - Tender - 1 - -   Right elbow - Swollen - - - 1   Right wrist- Tender 1 1 - 1   Right wrist- Swollen 1 1 - 1   Right 1st MCP - Tender 1 1 - 1   Right 1st MCP - Swollen 1 1 - 1   Right 2nd MCP - Tender - 1 - 1   Right 2nd MCP - Swollen - 1 - 1   Right 3rd MCP - Tender 1 1 - 1 Right 3rd MCP - Swollen 1 1 - 1   Right 4th MCP - Tender 1 1 - -   Right 4th MCP - Swollen 1 1 - -   Right 5th MCP - Tender 1 1 - 1   Right 5th MCP - Swollen 1 - - 1   Right thumb IP - Tender 1 - - -   Right 2nd PIP - Tender - 1 - -   Right 2nd PIP - Swollen - 1 - -   Right 3rd PIP - Tender - 1 - -   Right 4th PIP - Tender - 1 - -   Right 5th PIP - Tender - 1 - -   Tender Joint Count (Total) 7 - - -   Swollen Joint Count (Total) 7 - - -   Physician Assessment (0-10) 3 3 - 4   Patient Assessment (0-10) 6 8 0 0   CDAI Total (calculated) 23 - - -        DATA REVIEW    Laboratory     The following laboratory results were reviewed and discussed with the patient:    Telephone on 12/28/2016   Component Date Value    Cholesterol, total 02/07/2017 206*    Triglyceride 02/07/2017 209*    HDL Cholesterol 02/07/2017 36*    VLDL, calculated 02/07/2017 42*    LDL, calculated 02/07/2017 128*    INTERPRETATION 02/07/2017 Note    Office Visit on 12/19/2016   Component Date Value    PTH, Intact 12/19/2016 19     C-Reactive Protein, Qt 12/19/2016 1.5     Sed rate (ESR) 12/19/2016 18     Glucose 12/19/2016 113*    BUN 12/19/2016 25     Creatinine 12/19/2016 1.03*    GFR est non-AA 12/19/2016 52*    GFR est AA 12/19/2016 60     BUN/Creatinine ratio 12/19/2016 24     Sodium 12/19/2016 143     Potassium 12/19/2016 3.7     Chloride 12/19/2016 100     CO2 12/19/2016 26     Calcium 12/19/2016 10.5*    Protein, total 12/19/2016 7.0     Albumin 12/19/2016 4.3     GLOBULIN, TOTAL 12/19/2016 2.7     A-G Ratio 12/19/2016 1.6     Bilirubin, total 12/19/2016 0.6     Alk.  phosphatase 12/19/2016 86     AST (SGOT) 12/19/2016 24     ALT (SGPT) 12/19/2016 31     WBC 12/19/2016 8.6     RBC 12/19/2016 3.78     HGB 12/19/2016 11.3     HCT 12/19/2016 34.6     MCV 12/19/2016 92     MCH 12/19/2016 29.9     MCHC 12/19/2016 32.7     RDW 12/19/2016 16.8*    PLATELET 15/12/9418 415     NEUTROPHILS 12/19/2016 60     Lymphocytes 12/19/2016 25     MONOCYTES 12/19/2016 12     EOSINOPHILS 12/19/2016 2     BASOPHILS 12/19/2016 1     ABS. NEUTROPHILS 12/19/2016 5.2     Abs Lymphocytes 12/19/2016 2.1     ABS. MONOCYTES 12/19/2016 1.0*    ABS. EOSINOPHILS 12/19/2016 0.2     ABS. BASOPHILS 12/19/2016 0.0     IMMATURE GRANULOCYTES 12/19/2016 0     ABS. IMM. GRANS. 12/19/2016 0.0     Hematology comments: 12/19/2016 Note:    Office Visit on 11/21/2016   Component Date Value    WBC 11/21/2016 7.2     RBC 11/21/2016 4.12     HGB 11/21/2016 12.1     HCT 11/21/2016 36.3     MCV 11/21/2016 88     [address] 11/21/2016 29.4     MCHC 11/21/2016 33.3     RDW 11/21/2016 16.8*    PLATELET 21/24/6083 519     NEUTROPHILS 11/21/2016 50     Lymphocytes 11/21/2016 36     MONOCYTES 11/21/2016 10     EOSINOPHILS 11/21/2016 2     BASOPHILS 11/21/2016 1     ABS. NEUTROPHILS 11/21/2016 3.6     Abs Lymphocytes 11/21/2016 2.6     ABS. MONOCYTES 11/21/2016 0.8     ABS. EOSINOPHILS 11/21/2016 0.1     ABS. BASOPHILS 11/21/2016 0.1     IMMATURE GRANULOCYTES 11/21/2016 1     ABS. IMM. GRANS. 11/21/2016 0.1     Glucose 11/21/2016 133*    BUN 11/21/2016 22     Creatinine 11/21/2016 0.97     GFR est non-AA 11/21/2016 56*    GFR est AA 11/21/2016 64     BUN/Creatinine ratio 11/21/2016 23     Sodium 11/21/2016 138     Potassium 11/21/2016 3.9     Chloride 11/21/2016 96*    CO2 11/21/2016 25     Calcium 11/21/2016 10.4*    Protein, total 11/21/2016 7.1     Albumin 11/21/2016 4.4     GLOBULIN, TOTAL 11/21/2016 2.7     A-G Ratio 11/21/2016 1.6     Bilirubin, total 11/21/2016 0.5     Alk. phosphatase 11/21/2016 91     AST (SGOT) 11/21/2016 24     ALT (SGPT) 11/21/2016 33*    Sed rate (ESR) 11/21/2016 6     C-Reactive Protein, Qt 11/21/2016 0.7        Imaging    Musculoskeletal Ultrasound    None    Radiographs    Chest 9/28/2016: normal heart size. Is atherosclerotic change of the aorta. The lungs are clear acute process.  There is calcified granuloma in the right upper lobe with calcified right hilar lymph nodes. There is moderate compression deformity of approximately the T12 vertebral body that appears chronic. There are degenerative changes of the thoracic spine. Bilateral Hand 9/28/2016: LEFT: Bones are osteopenic. No acute fracture or dislocation. Moderate radiocarpal joint osteoarthritis. Age-appropriate intercarpal and first [facility] joint osteoarthritis. There are erosions at the third MCP joint. No other erosion. No other joint space narrowing. No soft tissue calcification.  RIGHT: No acute fracture. Old radius intra-articular fracture has healed. Increased now moderate radiocarpal joint osteoarthritis. Increased mild first MCP joint osteoarthritis. Increased mild-moderate first IP joint osteoarthritis. No joint space erosion or periosteal reaction. Alignment is within normal limits. Osteopenia is unchanged. No soft tissue calcification. Bilateral Foot 9/28/2016: LEFT: Bones are osteopenic. No acute fracture or dislocation. No erosion. Age-appropriate bilateral first MTP joint osteoarthritis. Minimal intertarsal osteoarthritis for age. No periosteal reaction or soft tissue calcification. RIGHT: Bones are osteopenic. No acute fracture or dislocation. No erosion. Age-appropriate bilateral first MTP joint osteoarthritis. Minimal intertarsal osteoarthritis for age. No periosteal reaction or soft tissue calcification. Right Hand 4/10/2010: showed demonstrate osteopenia. Ronnald Sovereign is an acute T-shaped intra-articular distal radial fracture. Ulna styloid peri like avulsion is also noted.  There is soft tissue swelling    CT Imaging    None    MR Imaging    None    DXA     DXA 11/23/2016: (L3 for compression fracture and L4 for spondylitic change) lumbar spine L1-L2 T score -1.7 (BMD 0.971 g/cm2), right femoral neck T score: -2.0 (0.757 g/cm2), righttotal hip T score: -1.7 (0.791 g/cm2), and distal one third left radius T score -4.6 (0.472 g/cm2). FRAX score 33.2 % probability in 10 years for major osteoporotic fracture and 21 % 10 year probability of hip fracture. ASSESSMENT AND PLAN    This is a follow-up visit for Ms. Ledesma. 1) Seropositive Erosive Nodular Rheumatoid Arthritis. She has tolerated methotrexate 25 mg every Wednesday well without adverse effects and has continued folic acid. Her CDAI on was 23 (previously 47, 23) with 7 tender and 7 swollen joints. She asked about Otrexup due to hearing from her pharmacy that methotrexate subcutaneous was on backorder so a prior authorization was submitted. She has been on 4 weeks of this dose of methotrexate, so I will re-evaluate after 2 months. Labs today. 2) Long Term Use of Immunosuppressants. The patient remains on immunomodulatory medications (methotrexate) and requires frequent toxicity monitoring by blood work. Respective labs were ordered (CBC and CMP). 3) Osteoporosis with Multiple Pathologic Fractures. She is taking calcium 1200 mg and 5000 UI of vitamin D daily. She is on Forteo.    4) Bilateral Knee Osteoarthritis. This continues to be an active issue. 5) Abnormal Gait. She is dependent on a walker to ambulate. There are no recent falls. I gave her a referral to physical therapy. 6) Asymptomatic Hyperuricemia. Her uric acid was 7.2 mg/dL. There is no history of gout. The patient voiced understanding of the aforementioned assessment and plan. Summary of plan was provided in the After Visit Summary patient instructions. TODAY'S ORDERS    No orders of the defined types were placed in this encounter.     Future Appointments  Date Time Provider Department Center   2/16/2017 3:40 PM Aaron Vega MD [facility]   2/17/2017 11:00 AM VASCULAR, [address] Laura Summers MD, [facility]    Adult Rheumatology   Musculoskeletal Ultrasound Certified  [address] [place] [address]   [id number] [place] [address], [place], [address] [facility]   Phone [phone]  Fax [phone]

## 2017-02-17 ENCOUNTER — CLINICAL SUPPORT (OUTPATIENT)
Dept: CARDIOLOGY CLINIC | Age: 82
End: 2017-02-17

## 2017-02-17 DIAGNOSIS — I65.22 LEFT CAROTID STENOSIS: Primary | ICD-10-CM

## 2017-02-17 NOTE — TELEPHONE ENCOUNTER
[address] Marlo with [employer] called regarding prior auth. Renetta stated she faxed document for signature and requested a call back upon receipt of fax. She can be reached at [phone]. Thank you!

## 2017-02-17 NOTE — PROCEDURES
[facility] [address] [place] [address]  *** FINAL REPORT ***    Name: Christopher Serrato  MRN: [id number]        Outpatient  : 1935  HIS Order #: [id number]  [id number] [facility] Visit #: [id number]  Date: 2017    TYPE OF TEST: Cerebrovascular Duplex    REASON FOR TEST  Known carotid stenosis    Right Carotid:-             Proximal               Mid                 Distal  cm/s  Systolic  Diastolic  Systolic  Diastolic  Systolic  Diastolic  CCA:     67.2      10.0                            51.0      12.0  Bulb:  ECA:    139.0       7.0  ICA:     44.0      14.0       50.0      16.0       63.0      22.0  ICA/CCA:  0.9       1.2    ICA Stenosis: <50%    Right Vertebral:-  Finding: Antegrade  Sys:       27.0  Sheba:        0.0    Right Subclavian:    Left Carotid:-            Proximal                Mid                 Distal  cm/s  Systolic  Diastolic  Systolic  Diastolic  Systolic  Diastolic  CCA:     11.5       9.0                            56.0      12.0  Bulb:  ECA:    432.0      28.0  ICA:    175.0      43.0      182.0      41.0       41.0      14.0  ICA/CCA:  3.1       3.6    ICA Stenosis: 50-69%    Left Vertebral:-  Finding: Antegrade  Sys:       17.0  Sheba:        0.0    Left Subclavian:    INTERPRETATION/FINDINGS  PROCEDURE:  Evaluation of the extracranial cerebrovascular arteries  with ultrasound (B-mode imaging, pulsed Doppler, color Doppler). Includes the common carotid, internal carotid, external carotid, and  vertebral arteries. FINDINGS:  Right:  Mild heterogeneous plaque is visualized within the common  carotid rtery. Trace plaque is noted at the origin of the internal  carotid artery. There are no appreciable color flow filling defects  in the ICA and peak velocities are normal at 73/15 cm/s. Left:  Mild, diffuse plaquing is noted in the common carotid artery. Moderate, partially calcified plaque encompasses the proximal internal   carotid artery and the origin of the external carotid artery.   Color  flow imaging demonstrates significant filling defects in both the ICA  and ECA and peak velocities are elevated. IMPRESSION: Findings are consistent with <10% stenosis of the right  internal carotid, 50-79% stenosis of the left internal carotid, and  50-99% stenosis of the left external carotid. Vertebral arteries are  patent with antegrade flow. In comparison with a previous study dated 12/2/2015, there has been no   interval change. ADDITIONAL COMMENTS    I have personally reviewed the data relevant to the interpretation of  this  study. TECHNOLOGIST: Amalia Hernandez RVT, RDMS, RDCS  Signed: 02/17/2017 01:48 PM    PHYSICIAN: Reza Hemphill MD  Signed: 02/22/2017 12:30 PM

## 2017-02-20 NOTE — TELEPHONE ENCOUNTER
Spoke with Trisha  at [employer]. I told him I did not receive fax. He will have it re-faxed. It is on Dr. Michelle holland to sign.     Received notice Praluent approved through 2/21/2019

## 2017-02-22 NOTE — TELEPHONE ENCOUNTER
Called patient. Verified patient's identity with two identifiers. Notified her Dr. Araya Speak said her carotid results were okay. I also notified her that her Praluent was approved. Patient verbalizes understanding and denies further questions or concerns.

## 2017-03-01 ENCOUNTER — TELEPHONE (OUTPATIENT)
Dept: RHEUMATOLOGY | Age: 82
End: 2017-03-01

## 2017-03-01 ENCOUNTER — TELEPHONE (OUTPATIENT)
Dept: CARDIOLOGY CLINIC | Age: 82
End: 2017-03-01

## 2017-03-01 NOTE — TELEPHONE ENCOUNTER
Renetta the pharmacist from [employer] called to let you know that the pt was approved for Praluent, med has been sent to pt.

## 2017-03-01 NOTE — TELEPHONE ENCOUNTER
Mailed appeal letter for denial of Orencia to [facility]. Appeals are handled by mail now per [facility].

## 2017-03-03 NOTE — TELEPHONE ENCOUNTER
Called patient. Verified patient's identity with two identifiers. Patient states she received praluent. She asked if okay for nurse to administer it. I told her that was fine and to call with any questions. Patient verbalizes understanding and denies further questions or concerns.

## 2017-03-10 ENCOUNTER — TELEPHONE (OUTPATIENT)
Dept: RHEUMATOLOGY | Age: 82
End: 2017-03-10

## 2017-03-10 NOTE — TELEPHONE ENCOUNTER
[employer] insurance calling to tell us this patient's Lesly Gram has been approved. Phone is [phone]. Reference # [id number].

## 2017-03-13 ENCOUNTER — TELEPHONE (OUTPATIENT)
Dept: RHEUMATOLOGY | Age: 82
End: 2017-03-13

## 2017-03-13 NOTE — TELEPHONE ENCOUNTER
Unable to locate Glory Reed, or approval for Orencia. The ref # is incorrect per [facility] [employer] Will try again tomorrow.

## 2017-03-13 NOTE — TELEPHONE ENCOUNTER
Patient informed our office has not received approval for [facility] today, but the person at [facility] could not locate the approval, and ask that our office try another number. [facility] number was put on hold for 20 minutes. Will try again tomorrow.

## 2017-03-13 NOTE — TELEPHONE ENCOUNTER
Patient would like a return call from nurse regarding a letter she received from her ins co regarding approval for [facility].   [phone]

## 2017-03-14 ENCOUNTER — TELEPHONE (OUTPATIENT)
Dept: RHEUMATOLOGY | Age: 82
End: 2017-03-14

## 2017-03-14 NOTE — TELEPHONE ENCOUNTER
Spoke with [facility], and [employer] has been approved PA# [id number] Good 3/10/2017-12/31/2017. Notified [facility] pharmacy of approval spoke with Kaiden Obando, and she states they will call the patient to set up delivery. Left message on patient's home phone to expect call from [facility], and [employer] has been approved.

## 2017-03-24 ENCOUNTER — TELEPHONE (OUTPATIENT)
Dept: RHEUMATOLOGY | Age: 82
End: 2017-03-24

## 2017-03-24 NOTE — TELEPHONE ENCOUNTER
Patient's son Pepe Beckman would like a call back regarding prescription Sandralee Light has some questions.   [phone]

## 2017-03-24 NOTE — TELEPHONE ENCOUNTER
Spoke with pt's son Florin Fonseca who stated that they just received the Orencia medication in the mail, and they were reading information regarding the medication and it stated that she should not take the medication if it coincides with any vaccines she has had recently. He stated that she just had her shingles vaccine in the beginning of February. He wanted to make sure that it was ok for her to start the medication at this time? Please advise.

## 2017-03-28 NOTE — TELEPHONE ENCOUNTER
Spoke with pt's son and let him know that it was ok for Ms. Ledesma to start the orencia medication now. He stated an understanding.

## 2017-05-08 ENCOUNTER — TELEPHONE (OUTPATIENT)
Dept: RHEUMATOLOGY | Age: 82
End: 2017-05-08

## 2017-05-08 NOTE — TELEPHONE ENCOUNTER
Patient's son would like a return call from nurse had to cancel appointment for tomorrow wants to discuss.   [phone]

## 2017-05-08 NOTE — TELEPHONE ENCOUNTER
Pt's son called and stated that he had to cancel Sandy's appt for this week. He said that she has been taking the orencia injections for a little over a month now and that her arthritis is actually getting worse. He stated that he lives in [place] and will be flying in on Thursday evening. He wanted to know if it was urgent to see Ms. Ledesma or if she needed to make an appt for once you return from vacation?

## 2017-05-09 NOTE — TELEPHONE ENCOUNTER
Spoke with pt's son and let him know that Ms. Jovi Johnson can come for a visit after he returns from vacation. He stated an understanding and an appt was made for June 16th.

## 2017-06-16 ENCOUNTER — OFFICE VISIT (OUTPATIENT)
Dept: RHEUMATOLOGY | Age: 82
End: 2017-06-16

## 2017-06-16 VITALS
RESPIRATION RATE: 18 BRPM | DIASTOLIC BLOOD PRESSURE: 61 MMHG | HEART RATE: 85 BPM | SYSTOLIC BLOOD PRESSURE: 145 MMHG | BODY MASS INDEX: 25.49 KG/M2 | TEMPERATURE: 97.5 F | WEIGHT: 135 LBS | OXYGEN SATURATION: 99 % | HEIGHT: 61 IN

## 2017-06-16 DIAGNOSIS — M05.79 SEROPOSITIVE RHEUMATOID ARTHRITIS OF MULTIPLE SITES (HCC): Primary | ICD-10-CM

## 2017-06-16 DIAGNOSIS — E55.9 VITAMIN D DEFICIENCY: ICD-10-CM

## 2017-06-16 DIAGNOSIS — M17.0 PRIMARY OSTEOARTHRITIS OF BOTH KNEES: ICD-10-CM

## 2017-06-16 DIAGNOSIS — Z79.60 LONG-TERM USE OF IMMUNOSUPPRESSANT MEDICATION: ICD-10-CM

## 2017-06-16 DIAGNOSIS — E79.0 ASYMPTOMATIC HYPERURICEMIA: ICD-10-CM

## 2017-06-16 DIAGNOSIS — M81.0 AGE-RELATED OSTEOPOROSIS WITHOUT CURRENT PATHOLOGICAL FRACTURE: ICD-10-CM

## 2017-06-16 NOTE — PROGRESS NOTES
REASON FOR VISIT    This is a follow-up visit for Ms. Ledesma for Seropositive Erosive Nodular Rheumatoid Arthritis and Osteoporosis. Inflammatory arthritis phenotype includes:  Anti-CCP positive: yes (71)  Rheumatoid factor positive: yes (81.8)  Erosive disease: yes  Extra-articular manifestations include: rheumatoid nodules    Immunosuppression Screening  (9/28/2016): Quantiferon TB: negative  PPD:  Not performed  Hepatitis B: negative   Hepatitis C: negative    Therapy History includes:     Current DMARD therapy includes: methotrexate 25 mg subcutaneous every Wednesday, Orencia subcutaneous  Prior DMARD therapy includes: methotrexate 25 mg subcutaneous  The following DMARDs have been ineffective: none  The following DMARDs were stopped because of side effects: none    Osteoporosis Historical Synopsis     Height loss since age 29 (at least two inches): 1.5   Fracture history includes: yes (right radius and ulna, hairline in legs, L3, T10, T12 vertebral fractures)  Family history of hip fracture: no     Daily calcium intake is 1200 mg  Daily vitamin D intake is 5000 IU     Smoking history: no  Alcohol consumption: no  Prednisone history: no     Exercise: yes (walks around the house, core exercises)     Previous work-up for osteoporosis includes the following:  DEXA Scan: 11/23/2016  Vitamin 25OH D level: 54.9  PTH: 19     Therapy History includes:     Current osteoporosis therapy includes:  Forteo  Prior osteoporosis therapy includes: Fosmax, Marlise Roll   The following osteoporosis have been ineffective: none  The following osteoporosis were stopped because of side effects: none     Immunizations:   Immunization History   Administered Date(s) Administered    Influenza High Dose Vaccine PF 11/01/2016    Influenza Vaccine Split 10/15/2010, 10/01/2011    Influenza Vaccine Whole 10/01/2004    Pneumococcal Vaccine (Unspecified Type) 06/29/2006     Active problems include:    Patient Active Problem List   Diagnosis Code  DM (diabetes mellitus) ([facility] E11.9    Hypothyroid E03.9    Colon cancer ([facility]) C18.9    H/O: CVA     Microalbuminuria R80.9    Osteoporosis M81.0    Essential hypertension I10    Anemia D64.9    Hyperlipidemia E78.5    Carotid bruit R09.89    Claudication ([facility]) I73.9    Weakness of left arm R29.898    PAD (peripheral artery disease) ([facility]) I73.9    Weakness due to cerebrovascular accident ([facility] I63.9, R53.1    Neuropathy in diabetes ([facility] E11.40    Occlusion and stenosis of carotid artery without mention of cerebral infarction I65.29    Seropositive rheumatoid arthritis of multiple sites ([facility]) M05.79    Primary osteoarthritis of both knees M17.0    Long-term use of immunosuppressant medication Z79.899    Statin intolerance Z78.9    Asymptomatic hyperuricemia E79.0    Vitamin D deficiency E55.9       HISTORY OF PRESENT ILLNESS    Ms. Radha Soto returns for a follow-up visit. On her last visit, I continued Forteo and methotrexate 25 mg subcutaneous every Saturday (Otrexup), and started Orencia subcutaneous. She noted improvement after Orencia slowly. Today, she reports improvement in her pain and movement. She continues to have swelling in her hands and stiffness lasting hours. She is using diclofenac gel for her knees and right wrist, which helps. She did not get her labs drawn at her lat visit. She has not fallen or fractured since her last visit. Ms. Radha Soto has continued her medications for arthritis (methotrexate, Orencia) and reports good tolerance without significant side effects. Last toxicity monitoring by blood work was done on 12/19/2016 and did not reveal any significant adverse effects, creatinine 1.03 mg/dL, eGFR 52. Most recent inflammatory markers from 12/19/2016 revealed a ESR 18 mm/hr (previously 6, 12 mm/hr) and CRP 1.5 mg/dL (previously 0.7, 1.4 mg/L). The patient has not had any interval hospital admissions, infections, or surgeries.     REVIEW OF SYSTEMS    A comprehensive review of systems was performed and pertinent results are documented in the HPI, review of systems is otherwise non-contributory. PAST MEDICAL HISTORY    She has a past medical history of Anemia (9/2/2009); Colon cancer ([facility] (9/2/2009); Colon cancer ([facility] (9/2/2009); DM (diabetes mellitus) ([facility] (9/2/2009); GERD (gastroesophageal reflux disease); H/O: CVA (9/2/2009); Hypothyroid (9/2/2009); Ill-defined condition; Microalbuminuria (9/2/2009); Osteoporosis (9/2/2009); Other and unspecified hyperlipidemia (1/27/2010); Rheumatoid arthritis ([facility] (9/2/2009); Rheumatoid arthritis involving ankle ([facility] (9/28/2016); and Unspecified essential hypertension (9/2/2009). She also has no past medical history of Abuse; Adult physical abuse; Arrhythmia; Asthma; CAD (coronary artery disease); Calculus of kidney; Chronic kidney disease; Chronic pain; Congestive heart failure, unspecified; Contact dermatitis and other eczema, due to unspecified cause; COPD; Depression; Headache; Liver disease; Psychotic disorder; PUD (peptic ulcer disease); Seizures ([facility] Stroke [facility]); Thromboembolus ([facility] Unspecified adverse effect of anesthesia; or Unspecified sleep apnea. FAMILY HISTORY    Her family history includes Cancer in her father; Diabetes in her brother, mother, sister, and sister; Other in her sister; Stroke in her mother. SOCIAL HISTORY    She reports that she has never smoked. She has never used smokeless tobacco. She reports that she does not drink alcohol or use illicit drugs. MEDICATIONS    Current Outpatient Prescriptions   Medication Sig Dispense Refill    abatacept (ORENCIA) 125 mg/mL syrg 125 mg by SubCUTAneous route once.  alirocumab (PRALUENT PEN) 75 mg/mL injector pen 1 mL by SubCUTAneous route Once every 2 weeks. 2 Syringe 11    methotrexate, PF, (OTREXUP, PF,) 25 mg/0.4 mL atIn 25 mg by SubCUTAneous route every seven (7) days.  (Patient taking differently: 25 mg by SubCUTAneous route Every Thursday.) 4 Pen 11    teriparatide (FORTEO) 20 mcg/dose - 600 mcg/2.4 mL pnij injection 0.08 mL by SubCUTAneous route daily. 2.4 mL 6    carvedilol (COREG) 25 mg tablet Take 25 mg by mouth two (2) times a day.  potassium chloride SR (KLOR-CON 10) 10 mEq tablet Take 20 mEq by mouth daily.  amLODIPine (NORVASC) 5 mg tablet Take 5 mg by mouth daily.  MONOJECT TB SAFETY SYRINGE 1 mL 25 gauge x 5/8\" syrg Use 1 syringe with methotrexate as directed. 12 Syringe 3    levothyroxine (SYNTHROID) 112 mcg tablet Take  by mouth Daily (before breakfast).  clopidogrel (PLAVIX) 75 mg tablet Take 1 Tab by mouth daily. 90 Tab 3    chlorthalidone (HYGROTEN) 50 mg tablet Take 50 mg by mouth daily.  sitaGLIPtin (JANUVIA) 25 mg tablet Take 50 mg by mouth daily.  CINNAMON BARK (CINNAMON PO) Take 250 mg by mouth daily.  OTHER L-citrulline malate complex 750mg. Takes one po daily.  VIT C/VIT E/LUTEIN/MIN/OMEGA-3 (OCUVITE PO) Take  by mouth. Takes one po daily.  MAGNESIUM MALATE Take 200 mg by mouth daily.  MULTIVITAMIN WITH MINERALS (HAIR,SKIN & NAILS PO) Take  by mouth. Takes one po daily.  lisinopril (PRINIVIL, ZESTRIL) 40 mg tablet Take 40 mg by mouth daily.  aspirin delayed-release 81 mg tablet Take 81 mg by mouth daily.  metFORMIN (GLUCOPHAGE) 1,000 mg tablet Take 1 Tab by mouth two (2) times daily (with meals). 180 Tab 1    LECITHIN PO Take 800 mg by mouth two (2) times a day.  OMEGA-3 FATTY ACIDS/FISH OIL (OMEGA 3 FISH OIL PO) Take 300 mg by mouth daily.  ascorbate calcium (MAKAYLA-C) 500 mg Tab Take 1,000 mg by mouth daily.  CHOLECALCIFEROL, VITAMIN D3, (VITAMIN D-3 PO) Take 10,000 Units by mouth daily.           ALLERGIES    Allergies   Allergen Reactions    Statins-Hmg-Coa Reductase Inhibitors Other (comments)     Intolerant to statins     Sulfa (Sulfonamide Antibiotics) Other (comments)     syncope       PHYSICAL EXAMINATION    Visit Vitals    /61 (BP 1 Location: Right arm, BP Patient Position: Sitting)    Pulse 85    Temp 97.5 °F (36.4 °C)    Resp 18    Ht 5' 1\" (1.549 m)    Wt 135 lb (61.2 kg)    SpO2 99%    BMI 25.51 kg/m2     Body mass index is 25.51 kg/(m^2). General: Patient is alert, oriented x 3, not in acute distress     HEENT:   Sclerae are not injected and appear moist.  Oral mucous membranes are moist, there are no ulcers present. There is no alopecia. Cardiovascular:  Heart is regular rate and rhythm, no murmurs. Chest:  Lungs are clear to auscultation bilaterally. No rhonchi, wheezes, or crackles. Extremities:  Free of clubbing, cyanosis, edema    Neurological exam:  No focal sensory deficits, muscle strength is full in upper and lower extremities     Skin exam:  There are no rashes, no alopecia, no discoid lesions, no active Raynaud's, no livedo reticularis, no periungual erythema. Right thumb nodule    Musculoskeletal exam:  A comprehensive musculoskeletal exam was performed for all joints of each upper and lower extremity and assessed for swelling, tenderness and range of motion.  Positive results are documented as below:    Bilateral ankle tenderness   Bilateral MTP tenderness (resolved)  Bilateral knee crepitus without effusion     Joint Count 6/16/2017 2/16/2017 12/19/2016 11/21/2016 10/13/2016 9/28/2016   Patient pain (0-100) 10 60 30 50 10 5   MHAQ 0.875 2 1.38 1.63 0.63 1   Left elbow - Tender - 1 - - - -   Left elbow - Swollen - 1 - - - -   Left wrist- Tender - - - 1 - 1   Left wrist- Swollen 1 1 - 1 - 1   Left 1st MCP - Tender - - - 1 - -   Left 1st MCP - Swollen 1 1 1 1 - -   Left 2nd MCP - Tender - - - 1 - -   Left 2nd MCP - Swollen - - - 1 - -   Left 3rd MCP - Tender - - 1 1 - 1   Left 3rd MCP - Swollen - 1 1 1 - 1   Left 4th MCP - Tender - - - 1 - 1   Left 4th MCP - Swollen - - - 1 - 1   Left 5th MCP - Tender - - - 1 - 1   Left 5th MCP - Swollen 1 - - 1 - 1   Left thumb IP - Tender - - - 1 - -   Left thumb IP - Swollen - - - 1 - -   Left 2nd PIP - Tender - - - 1 - -   Left 2nd PIP - Swollen - - - 1 - -   Left 3rd PIP - Tender - 1 - 1 - -   Left 3rd PIP - Swollen - - - 1 - -   Left 4th PIP - Tender - - - 1 - -   Left 5th PIP - Tender - - - 1 - -   Right elbow - Tender 1 1 - - - -   Right elbow - Swollen 1 1 - - - 1   Right wrist- Tender 1 1 1 1 - 1   Right wrist- Swollen 1 1 1 1 - 1   Right 1st MCP - Tender 1 1 1 1 - 1   Right 1st MCP - Swollen 1 1 1 1 - 1   Right 2nd MCP - Tender - - - 1 - 1   Right 2nd MCP - Swollen 1 - - 1 - 1   Right 3rd MCP - Tender 1 1 1 1 - 1   Right 3rd MCP - Swollen 1 1 1 1 - 1   Right 4th MCP - Tender 1 - 1 1 - -   Right 4th MCP - Swollen 1 - 1 1 - -   Right 5th MCP - Tender 1 1 1 1 - 1   Right 5th MCP - Swollen 1 1 1 - - 1   Right thumb IP - Tender 1 - 1 - - -   Right 2nd PIP - Tender 1 1 - 1 - -   Right 2nd PIP - Swollen 1 1 - 1 - -   Right 3rd PIP - Tender 1 1 - 1 - -   Right 4th PIP - Tender 1 1 - 1 - -   Right 5th PIP - Tender 1 1 - 1 - -   Tender Joint Count (Total) 11 11 7 - - -   Swollen Joint Count (Total) 11 10 7 - - -   Physician Assessment (0-10) 3 3 3 3 - 4   Patient Assessment (0-10) 0.5 5.5 6 8 0 0   CDAI Total (calculated) 25.5 29.5 23 - - -        DATA REVIEW    Laboratory     The following laboratory results were reviewed and discussed with the patient:    No visits with results within 16 Week(s) from this visit. Latest known visit with results is:    Telephone on 12/28/2016   Component Date Value    Cholesterol, total 02/07/2017 206*    Triglyceride 02/07/2017 209*    HDL Cholesterol 02/07/2017 36*    VLDL, calculated 02/07/2017 42*    LDL, calculated 02/07/2017 128*    INTERPRETATION 02/07/2017 Note        Imaging    Musculoskeletal Ultrasound    None    Radiographs    Chest 9/28/2016: normal heart size. Is atherosclerotic change of the aorta. The lungs are clear acute process.  There is calcified granuloma in the right upper lobe with calcified right hilar lymph nodes. There is moderate compression deformity of approximately the T12 vertebral body that appears chronic. There are degenerative changes of the thoracic spine. Bilateral Hand 9/28/2016: LEFT: Bones are osteopenic. No acute fracture or dislocation. Moderate radiocarpal joint osteoarthritis. Age-appropriate intercarpal and first [facility] joint osteoarthritis. There are erosions at the third MCP joint. No other erosion. No other joint space narrowing. No soft tissue calcification.  RIGHT: No acute fracture. Old radius intra-articular fracture has healed. Increased now moderate radiocarpal joint osteoarthritis. Increased mild first MCP joint osteoarthritis. Increased mild-moderate first IP joint osteoarthritis. No joint space erosion or periosteal reaction. Alignment is within normal limits. Osteopenia is unchanged. No soft tissue calcification. Bilateral Foot 9/28/2016: LEFT: Bones are osteopenic. No acute fracture or dislocation. No erosion. Age-appropriate bilateral first MTP joint osteoarthritis. Minimal intertarsal osteoarthritis for age. No periosteal reaction or soft tissue calcification. RIGHT: Bones are osteopenic. No acute fracture or dislocation. No erosion. Age-appropriate bilateral first MTP joint osteoarthritis. Minimal intertarsal osteoarthritis for age. No periosteal reaction or soft tissue calcification. Right Hand 4/10/2010: showed demonstrate osteopenia. Marquis Kaylee is an acute T-shaped intra-articular distal radial fracture. Ulna styloid peri like avulsion is also noted. There is soft tissue swelling    CT Imaging    None    MR Imaging    None    DXA     DXA 11/23/2016: (L3 for compression fracture and L4 for spondylitic change) lumbar spine L1-L2 T score -1.7 (BMD 0.971 g/cm2), right femoral neck T score: -2.0 (0.757 g/cm2), righttotal hip T score: -1.7 (0.791 g/cm2), and distal one third left radius T score -4.6 (0.472 g/cm2).  FRAX score 33.2 % probability in 10 years for major osteoporotic fracture and 21 % 10 year probability of hip fracture. ASSESSMENT AND PLAN    This is a follow-up visit for Ms. Ledesma. 1) Seropositive Erosive Nodular Rheumatoid Arthritis. She is maintained on methotrexate (Otrexup) 25 mg every Wednesday and Orencia subcutaneous every week with good tolerance. Her CDAI was 25.5 (previously 29.5, 23, 47, 23) with 11 tender and 11 swollen joints. She has been on Orencia since end of March. It has almost been 3 months on treatment. I will continue her regimen for now. Labs today. 2) Long Term Use of Immunosuppressants. The patient remains on immunomodulatory medications (methotrexate, Orencia) and requires frequent toxicity monitoring by blood work. Respective labs were ordered (CBC and CMP). 3) Osteoporosis with Multiple Pathologic Fractures. She is taking calcium 1200 mg and 5000 UI of vitamin D daily. She is on Forteo with good tolerance.    4) Bilateral Knee Osteoarthritis. This continues to be an active issue but it improves with diclofenac gel. 5) Abnormal Gait. She is dependent on a walker to ambulate. There are no recent falls. 6) Asymptomatic Hyperuricemia. Her uric acid was 7.2 mg/dL. There is no history of gout. I will repeat it today. 7) Vitamin D Deficiency. Her level was 59.4. She is on replacement. I will check her level today. The patient voiced understanding of the aforementioned assessment and plan. Summary of plan was provided in the After Visit Summary patient instructions.      TODAY'S ORDERS    Orders Placed This Encounter    CBC WITH AUTOMATED DIFF    METABOLIC PANEL, COMPREHENSIVE    C REACTIVE PROTEIN, QT    SED RATE (ESR)    URIC ACID    VITAMIN D, 25 HYDROXY    abatacept (ORENCIA) 125 mg/mL syrg     Future Appointments  Date Time Provider Department Center   9/14/2017 2:20 PM Petra Lesches, MD [address], MD, [facility]    Adult Rheumatology   Musculoskeletal Ultrasound Certified  [facility]   [address], [place], [address]   Phone [phone]  Fax [phone]

## 2017-06-16 NOTE — MR AVS SNAPSHOT
Visit Information Date & Time Provider Department Dept. Phone Encounter #  
 6/16/2017 10:20 AM Marsha Mcwilliams [id number] Follow-up Instructions Return in about 3 months (around 9/16/2017). Upcoming Health Maintenance Date Due  
 FOOT EXAM Q1 2/21/1945 EYE EXAM RETINAL OR DILATED Q1 2/21/1945 DTaP/Tdap/Td series (1 - Tdap) 2/21/1958 ZOSTER VACCINE AGE 60> 2/21/1997 GLAUCOMA SCREENING Q2Y 2/21/2002 MEDICARE YEARLY EXAM 2/21/2002 Pneumococcal 65+ High/Highest Risk (2 of 2 - PPSV23) 6/29/2011 BREAST CANCER SCRN MAMMOGRAM 5/19/2012 HEMOGLOBIN A1C Q6M 2/5/2014 COLONOSCOPY 4/20/2016 MICROALBUMIN Q1 3/17/2017 INFLUENZA AGE 9 TO ADULT 8/1/2017 LIPID PANEL Q1 2/7/2018 Allergies as of 6/16/2017  Review Complete On: 6/16/2017 By: Marsha Mcwilliams MD  
  
 Severity Noted Reaction Type Reactions Statins-hmg-coa Reductase Inhibitors  12/21/2016    Other (comments) Intolerant to statins Sulfa (Sulfonamide Antibiotics)  09/02/2009    Other (comments)  
 syncope Current Immunizations  Reviewed on 11/21/2016 Name Date Influenza High Dose Vaccine PF 11/1/2016 Influenza Vaccine Split 10/1/2011, 10/15/2010 Influenza Vaccine Whole 10/1/2004 Pneumococcal Vaccine (Unspecified Type) 6/29/2006 Not reviewed this visit You Were Diagnosed With   
  
 Codes Comments Seropositive rheumatoid arthritis of multiple sites [facility])    -  Primary ICD-10-CM: M05.79 ICD-9-CM: 714.0 Long-term use of immunosuppressant medication     ICD-10-CM: Z79.899 ICD-9-CM: V58.69 Primary osteoarthritis of both knees     ICD-10-CM: M17.0 ICD-9-CM: 715.16 Age-related osteoporosis without current pathological fracture     ICD-10-CM: M81.0 ICD-9-CM: 733.01 Asymptomatic hyperuricemia     ICD-10-CM: E79.0 ICD-9-CM: 790.6 Vitamin D deficiency     ICD-10-CM: E55.9 ICD-9-CM: 268.9 Vitals BP Pulse Temp Resp Height(growth percentile) Weight(growth percentile) 145/61 (BP 1 Location: Right arm, BP Patient Position: Sitting) 85 97.5 °F (36.4 °C) 18 5' 1\" (1.549 m) 135 lb (61.2 kg) SpO2 BMI OB Status Smoking Status 99% 25.51 kg/m2 Hysterectomy Never Smoker BMI and BSA Data Body Mass Index Body Surface Area 25.51 kg/m 2 1.62 m 2 Preferred Pharmacy Pharmacy Name Phone [facility] [phone] Your Updated Medication List  
  
   
This list is accurate as of: 6/16/17 11:10 AM.  Always use your most recent med list.  
  
  
  
  
 abatacept 125 mg/mL Syrg Commonly known as:  ORENCIA  
125 mg by SubCUTAneous route once. alirocumab 75 mg/mL injector pen Commonly known as:  PRALUENT PEN  
1 mL by SubCUTAneous route Once every 2 weeks. amLODIPine 5 mg tablet Commonly known as:  Christina Rafter Take 5 mg by mouth daily. aspirin delayed-release 81 mg tablet Take 81 mg by mouth daily. carvedilol 25 mg tablet Commonly known as:  Shani Spinner Take 25 mg by mouth two (2) times a day. chlorthalidone 50 mg tablet Commonly known as:  Raffy Roots Take 50 mg by mouth daily. CINNAMON PO Take 250 mg by mouth daily. clopidogrel 75 mg Tab Commonly known as:  PLAVIX Take 1 Tab by mouth daily. MAKAYLA-C 500 mg Tab Generic drug:  ascorbate calcium Take 1,000 mg by mouth daily. HAIR,SKIN & NAILS PO Take  by mouth. Takes one po daily. JANUVIA 25 mg tablet Generic drug:  SITagliptin Take 50 mg by mouth daily. LECITHIN PO Take 800 mg by mouth two (2) times a day. levothyroxine 112 mcg tablet Commonly known as:  SYNTHROID Take  by mouth Daily (before breakfast). lisinopril 40 mg tablet Commonly known as:  Williams Lofty Take 40 mg by mouth daily. MAGNESIUM MALATE Take 200 mg by mouth daily. metFORMIN 1,000 mg tablet Commonly known as:  GLUCOPHAGE Take 1 Tab by mouth two (2) times daily (with meals). methotrexate (PF) 25 mg/0.4 mL Atin Commonly known as:  OTREXUP (PF)  
25 mg by SubCUTAneous route every seven (7) days. MONOJECT TB SAFETY SYRINGE 1 mL 25 gauge x 5/8\" Syrg Generic drug:  Syringe with Needle (Disp) Use 1 syringe with methotrexate as directed. OCUVITE PO Take  by mouth. Takes one po daily. OMEGA 3 FISH OIL PO Take 300 mg by mouth daily. OTHER  
L-citrulline malate complex 750mg. Takes one po daily. potassium chloride SR 10 mEq tablet Commonly known as:  KLOR-CON 10 Take 20 mEq by mouth daily. teriparatide 20 mcg/dose - 600 mcg/2.4 mL Pnij injection Commonly known as:  FORTEO  
0.08 mL by SubCUTAneous route daily. VITAMIN D3 PO Take 10,000 Units by mouth daily. We Performed the Following C REACTIVE PROTEIN, QT [92430 CPT(R)] CBC WITH AUTOMATED DIFF [91175 CPT(R)] METABOLIC PANEL, COMPREHENSIVE [15547 CPT(R)] SED RATE (ESR) [id number] CPT(R)] URIC ACID [id number] CPT(R)] VITAMIN D, 25 HYDROXY [id number] CPT(R)] Follow-up Instructions Return in about 3 months (around 9/16/2017). Patient Instructions CONTINUE METHOTREXATE, ORENCIA, FOLIC ACID, AND FORTEO 
 
[employer] Introducing [facility] [place] [facility]! [employer] introduces [employer] patient portal. Now you can access parts of your medical record, email your doctor's office, and request medication refills online. 1. In your internet browser, go to [email or web address] 2. Click on the First Time User? Click Here link in the Sign In box. You will see the New Member Sign Up page. 3. Enter your [employer] Access Code exactly as it appears below. You will not need to use this code after youve completed the sign-up process.  If you do not sign up before the expiration date, you must request a new code. · [employer] Access Code: [email or web address] Expires: 9/14/2017 10:44 AM 
 
4. Enter the last four digits of your Social Security Number (xxxx) and Date of Birth (mm/dd/yyyy) as indicated and click Submit. You will be taken to the next sign-up page. 5. Create a [employer] ID. This will be your [employer] login ID and cannot be changed, so think of one that is secure and easy to remember. 6. Create a [employer] password. You can change your password at any time. 7. Enter your Password Reset Question and Answer. This can be used at a later time if you forget your password. 8. Enter your e-mail address. You will receive e-mail notification when new information is available in [address] 9. Click Sign Up. You can now view and download portions of your medical record. 10. Click the Download Summary menu link to download a portable copy of your medical information. If you have questions, please visit the Frequently Asked Questions section of the [employer] website. Remember, [employer] is NOT to be used for urgent needs. For medical emergencies, dial 911. Now available from your iPhone and Android! Please provide this summary of care documentation to your next provider. Your primary care clinician is listed as Kenneth Skill. If you have any questions after today's visit, please call [phone].

## 2017-06-17 LAB
25(OH)D3+25(OH)D2 SERPL-MCNC: 82.3 NG/ML (ref 30–100)
ALBUMIN SERPL-MCNC: 4.4 G/DL (ref 3.5–4.7)
ALBUMIN/GLOB SERPL: 1.7 {RATIO} (ref 1.2–2.2)
ALP SERPL-CCNC: 155 IU/L (ref 39–117)
ALT SERPL-CCNC: 41 IU/L (ref 0–32)
AST SERPL-CCNC: 25 IU/L (ref 0–40)
BASOPHILS # BLD AUTO: 0 X10E3/UL (ref 0–0.2)
BASOPHILS NFR BLD AUTO: 1 %
BILIRUB SERPL-MCNC: 0.3 MG/DL (ref 0–1.2)
BUN SERPL-MCNC: 25 MG/DL (ref 8–27)
BUN/CREAT SERPL: 27 (ref 12–28)
CALCIUM SERPL-MCNC: 10.7 MG/DL (ref 8.7–10.3)
CHLORIDE SERPL-SCNC: 97 MMOL/L (ref 96–106)
CO2 SERPL-SCNC: 21 MMOL/L (ref 18–29)
CREAT SERPL-MCNC: 0.91 MG/DL (ref 0.57–1)
CRP SERPL-MCNC: 4.2 MG/L (ref 0–4.9)
EOSINOPHIL # BLD AUTO: 0.1 X10E3/UL (ref 0–0.4)
EOSINOPHIL NFR BLD AUTO: 2 %
ERYTHROCYTE [DISTWIDTH] IN BLOOD BY AUTOMATED COUNT: 18 % (ref 12.3–15.4)
ERYTHROCYTE [SEDIMENTATION RATE] IN BLOOD BY WESTERGREN METHOD: 27 MM/HR (ref 0–40)
GLOBULIN SER CALC-MCNC: 2.6 G/DL (ref 1.5–4.5)
GLUCOSE SERPL-MCNC: 186 MG/DL (ref 65–99)
HCT VFR BLD AUTO: 35.4 % (ref 34–46.6)
HGB BLD-MCNC: 11.5 G/DL (ref 11.1–15.9)
IMM GRANULOCYTES # BLD: 0 X10E3/UL (ref 0–0.1)
IMM GRANULOCYTES NFR BLD: 0 %
LYMPHOCYTES # BLD AUTO: 1.4 X10E3/UL (ref 0.7–3.1)
LYMPHOCYTES NFR BLD AUTO: 22 %
MCH RBC QN AUTO: 30.3 PG (ref 26.6–33)
MCHC RBC AUTO-ENTMCNC: 32.5 G/DL (ref 31.5–35.7)
MCV RBC AUTO: 93 FL (ref 79–97)
MONOCYTES # BLD AUTO: 0.7 X10E3/UL (ref 0.1–0.9)
MONOCYTES NFR BLD AUTO: 11 %
NEUTROPHILS # BLD AUTO: 4 X10E3/UL (ref 1.4–7)
NEUTROPHILS NFR BLD AUTO: 64 %
PLATELET # BLD AUTO: 182 X10E3/UL (ref 150–379)
POTASSIUM SERPL-SCNC: 4.2 MMOL/L (ref 3.5–5.2)
PROT SERPL-MCNC: 7 G/DL (ref 6–8.5)
RBC # BLD AUTO: 3.8 X10E6/UL (ref 3.77–5.28)
SODIUM SERPL-SCNC: 142 MMOL/L (ref 134–144)
URATE SERPL-MCNC: 8.7 MG/DL (ref 2.5–7.1)
WBC # BLD AUTO: 6.2 X10E3/UL (ref 3.4–10.8)

## 2017-08-29 RX ORDER — TERIPARATIDE 250 UG/ML
INJECTION, SOLUTION SUBCUTANEOUS
Qty: 2.4 ML | Refills: 0 | Status: SHIPPED | OUTPATIENT
Start: 2017-08-29 | End: 2017-09-30 | Stop reason: SDUPTHER

## 2017-09-28 ENCOUNTER — OFFICE VISIT (OUTPATIENT)
Dept: RHEUMATOLOGY | Age: 82
End: 2017-09-28

## 2017-09-28 VITALS
DIASTOLIC BLOOD PRESSURE: 73 MMHG | BODY MASS INDEX: 24.92 KG/M2 | WEIGHT: 132 LBS | HEIGHT: 61 IN | SYSTOLIC BLOOD PRESSURE: 144 MMHG | HEART RATE: 92 BPM | TEMPERATURE: 98.1 F | RESPIRATION RATE: 18 BRPM | OXYGEN SATURATION: 98 %

## 2017-09-28 DIAGNOSIS — E79.0 ASYMPTOMATIC HYPERURICEMIA: ICD-10-CM

## 2017-09-28 DIAGNOSIS — Z79.60 LONG-TERM USE OF IMMUNOSUPPRESSANT MEDICATION: ICD-10-CM

## 2017-09-28 DIAGNOSIS — E55.9 VITAMIN D DEFICIENCY: ICD-10-CM

## 2017-09-28 DIAGNOSIS — M81.0 AGE-RELATED OSTEOPOROSIS WITHOUT CURRENT PATHOLOGICAL FRACTURE: ICD-10-CM

## 2017-09-28 DIAGNOSIS — M05.79 SEROPOSITIVE RHEUMATOID ARTHRITIS OF MULTIPLE SITES (HCC): Primary | ICD-10-CM

## 2017-09-28 RX ORDER — ETANERCEPT 50 MG/ML
50 SOLUTION SUBCUTANEOUS
Qty: 0.98 ML | Refills: 0 | Status: SHIPPED | COMMUNITY
Start: 2017-09-28 | End: 2017-09-28 | Stop reason: SDUPTHER

## 2017-09-28 RX ORDER — PEN NEEDLE, DIABETIC 31 GX5/16"
NEEDLE, DISPOSABLE MISCELLANEOUS
Qty: 12 PEN NEEDLE | Refills: 3 | Status: SHIPPED | OUTPATIENT
Start: 2017-09-28 | End: 2018-04-16

## 2017-09-28 RX ORDER — ETANERCEPT 50 MG/ML
50 SOLUTION SUBCUTANEOUS
Qty: 11.76 ML | Refills: 4 | Status: SHIPPED | OUTPATIENT
Start: 2017-09-28 | End: 2017-09-28 | Stop reason: SDUPTHER

## 2017-09-28 RX ORDER — ETANERCEPT 50 MG/ML
50 SOLUTION SUBCUTANEOUS
Qty: 2.94 ML | Refills: 0 | Status: SHIPPED | COMMUNITY
Start: 2017-09-28 | End: 2017-10-13

## 2017-09-28 NOTE — PROGRESS NOTES
REASON FOR VISIT    This is a follow-up visit for Ms. Ledesma for Seropositive Erosive Nodular Rheumatoid Arthritis and Osteoporosis. Inflammatory arthritis phenotype includes:  Anti-CCP positive: yes (71)  Rheumatoid factor positive: yes (81.8)  Erosive disease: yes  Extra-articular manifestations include: rheumatoid nodules    Immunosuppression Screening  (9/28/2016): Quantiferon TB: negative  PPD:  Not performed  Hepatitis B: negative   Hepatitis C: negative    Therapy History includes:     Current DMARD therapy includes: methotrexate 25 mg subcutaneous every Wednesday  Prior DMARD therapy includes: methotrexate 25 mg subcutaneous, Orencia subcutaneous (3/2017-9/2017)  The following DMARDs have been ineffective: Orencia  The following DMARDs were stopped because of side effects: none    Osteoporosis Historical Synopsis     Height loss since age 29 (at least two inches): 1.5   Fracture history includes: yes (right radius and ulna, hairline in legs, L3, T10, T12 vertebral fractures)  Family history of hip fracture: no     Daily calcium intake is 1200 mg  Daily vitamin D intake is 1000 IU     Smoking history: no  Alcohol consumption: no  Prednisone history: no     Exercise: yes (walks around the house, core exercises)     Previous work-up for osteoporosis includes the following:  DEXA Scan: 11/23/2016  Vitamin 25OH D level: 82.3  PTH: 19     Therapy History includes:     Current osteoporosis therapy includes:  Forteo  Prior osteoporosis therapy includes: Fosmax, Durga Grate   The following osteoporosis have been ineffective: none  The following osteoporosis were stopped because of side effects: none     Immunizations:   Immunization History   Administered Date(s) Administered    Influenza High Dose Vaccine PF 11/01/2016    Influenza Vaccine Split 10/15/2010, 10/01/2011    Influenza Vaccine Whole 10/01/2004    ZZZ-RETIRED (DO NOT USE) Pneumococcal Vaccine (Unspecified Type) 06/29/2006     Active problems include:    Patient Active Problem List   Diagnosis Code    DM (diabetes mellitus) ([facility] E11.9    Hypothyroid E03.9    Colon cancer ([facility] C18.9    H/O: CVA     Microalbuminuria R80.9    Osteoporosis M81.0    Essential hypertension I10    Anemia D64.9    Hyperlipidemia E78.5    Carotid bruit R09.89    Claudication (HCC) I73.9    Weakness of left arm R29.898    PAD (peripheral artery disease) ([facility]) I73.9    Weakness due to cerebrovascular accident ([facility] I63.9, R53.1    Neuropathy in diabetes ([facility] E11.40    Occlusion and stenosis of carotid artery without mention of cerebral infarction I65.29    Seropositive rheumatoid arthritis of multiple sites ([facility] M05.79    Primary osteoarthritis of both knees M17.0    Long-term use of immunosuppressant medication Z79.899    Statin intolerance Z78.9    Asymptomatic hyperuricemia E79.0    Vitamin D deficiency E55.9       HISTORY OF PRESENT ILLNESS    Ms. Anna Huertas returns for a follow-up visit. On her last visit, I continued Forteo and methotrexate 25 mg subcutaneous every Saturday (Otrexup), and Orencia subcutaneous. She noted improvement after Orencia slowly. Today, she feels tightness in her fingers lasting hours. She has minimal pain and that her fingers may be getting a little swollen. She ankles are swollen that is all day and without pain. She has not fallen or fractured since her last visit. Ms. Anna Huertas has continued her medications for arthritis (methotrexate, Orencia) and reports good tolerance without significant side effects. Last toxicity monitoring by blood work was done on 6/16/2017 and did not reveal any significant adverse effects, creatinine 0.91 mg/dL, eGFR 60, ALT 41 U/L, . Vitamin D was 82.3, she was asked to decrease her dose to half. Most recent inflammatory markers from 6/16/2017 revealed a ESR 18 mm/hr (previously 6, 12 mm/hr) and CRP 1.5 mg/dL (previously 0.7, 1.4 mg/L).     The patient has not had any interval hospital admissions, infections, or surgeries. REVIEW OF SYSTEMS    A comprehensive review of systems was performed and pertinent results are documented in the HPI, review of systems is otherwise non-contributory. PAST MEDICAL HISTORY    She has a past medical history of Anemia (9/2/2009); Colon cancer ([facility] (9/2/2009); Colon cancer ([facility] (9/2/2009); DM (diabetes mellitus) ([facility] (9/2/2009); GERD (gastroesophageal reflux disease); H/O: CVA (9/2/2009); Hypothyroid (9/2/2009); Ill-defined condition; Microalbuminuria (9/2/2009); Osteoporosis (9/2/2009); Other and unspecified hyperlipidemia (1/27/2010); Rheumatoid arthritis ([facility] (9/2/2009); Rheumatoid arthritis involving ankle ([facility] (9/28/2016); and Unspecified essential hypertension (9/2/2009). She also has no past medical history of Abuse; Adult physical abuse; Arrhythmia; Asthma; CAD (coronary artery disease); Calculus of kidney; Chronic kidney disease; Chronic pain; Congestive heart failure, unspecified; Contact dermatitis and other eczema, due to unspecified cause; COPD; Depression; Headache; Liver disease; Psychotic disorder; PUD (peptic ulcer disease); Seizures ([facility] Stroke [facility]); Thromboembolus ([facility] Unspecified adverse effect of anesthesia; or Unspecified sleep apnea. FAMILY HISTORY    Her family history includes Cancer in her father; Diabetes in her brother, mother, sister, and sister; Other in her sister; Stroke in her mother. SOCIAL HISTORY    She reports that she has never smoked. She has never used smokeless tobacco. She reports that she does not drink alcohol or use illicit drugs. MEDICATIONS    Current Outpatient Prescriptions   Medication Sig Dispense Refill    BD INSULIN PEN NEEDLE UF MINI 31 gauge x 3/16\" ndle USE AS DIRECTED WITH FORTEO PEN 12 Pen Needle 3    ENBREL SURECLICK 50 mg/mL (7.65 mL) injection 0.98 mL by SubCUTAneous route every seven (7) days for 3 doses.  Indications: RHEUMATOID ARTHRITIS 2.94 mL 0    FORTEO 20 mcg/dose - 600 mcg/2.4 mL pnij injection USE 1 INJECTION (20 MCG OR 0.08ML) UNDER THE SKIN ONCE DAILY. 2.4 mL 0    alirocumab (PRALUENT PEN) 75 mg/mL injector pen 1 mL by SubCUTAneous route Once every 2 weeks. 2 Syringe 11    methotrexate, PF, (OTREXUP, PF,) 25 mg/0.4 mL atIn 25 mg by SubCUTAneous route every seven (7) days. (Patient taking differently: 25 mg by SubCUTAneous route Every Thursday.) 4 Pen 11    carvedilol (COREG) 25 mg tablet Take 25 mg by mouth two (2) times a day.  potassium chloride SR (KLOR-CON 10) 10 mEq tablet Take 20 mEq by mouth daily.  amLODIPine (NORVASC) 5 mg tablet Take 5 mg by mouth daily.  MONOJECT TB SAFETY SYRINGE 1 mL 25 gauge x 5/8\" syrg Use 1 syringe with methotrexate as directed. 12 Syringe 3    levothyroxine (SYNTHROID) 112 mcg tablet Take  by mouth Daily (before breakfast).  clopidogrel (PLAVIX) 75 mg tablet Take 1 Tab by mouth daily. 90 Tab 3    chlorthalidone (HYGROTEN) 50 mg tablet Take 50 mg by mouth daily.  sitaGLIPtin (JANUVIA) 25 mg tablet Take 50 mg by mouth daily.  CINNAMON BARK (CINNAMON PO) Take 250 mg by mouth daily.  VIT C/VIT E/LUTEIN/MIN/OMEGA-3 (OCUVITE PO) Take  by mouth. Takes one po daily.  MAGNESIUM MALATE Take 200 mg by mouth daily.  MULTIVITAMIN WITH MINERALS (HAIR,SKIN & NAILS PO) Take  by mouth. Takes one po daily.  lisinopril (PRINIVIL, ZESTRIL) 40 mg tablet Take 40 mg by mouth daily.  aspirin delayed-release 81 mg tablet Take 81 mg by mouth daily.  metFORMIN (GLUCOPHAGE) 1,000 mg tablet Take 1 Tab by mouth two (2) times daily (with meals). 180 Tab 1    LECITHIN PO Take 800 mg by mouth two (2) times a day.  OMEGA-3 FATTY ACIDS/FISH OIL (OMEGA 3 FISH OIL PO) Take 300 mg by mouth daily.  ascorbate calcium (MAKAYLA-C) 500 mg Tab Take 1,000 mg by mouth daily.  CHOLECALCIFEROL, VITAMIN D3, (VITAMIN D-3 PO) Take 1,000 Units by mouth daily.       OTHER L-citrulline malate complex 750mg. Takes one po daily. ALLERGIES    Allergies   Allergen Reactions    Statins-Hmg-Coa Reductase Inhibitors Other (comments)     Intolerant to statins     Sulfa (Sulfonamide Antibiotics) Other (comments)     syncope       PHYSICAL EXAMINATION    Visit Vitals    /73 (BP 1 Location: Left arm, BP Patient Position: Sitting)    Pulse 92    Temp 98.1 °F (36.7 °C) (Oral)    Resp 18    Ht 5' 1\" (1.549 m)    Wt 132 lb (59.9 kg)    SpO2 98%    BMI 24.94 kg/m2     Body mass index is 24.94 kg/(m^2). General: Patient is alert, oriented x 3, not in acute distress     HEENT:   Sclerae are not injected and appear moist.  Oral mucous membranes are moist, there are no ulcers present. There is no alopecia. Cardiovascular:  Heart is regular rate and rhythm, no murmurs. Chest:  Lungs are clear to auscultation bilaterally. No rhonchi, wheezes, or crackles. Extremities:  Free of clubbing, cyanosis, edema    Neurological exam:  No focal sensory deficits, muscle strength is full in upper and lower extremities     Skin exam:  There are no rashes, no alopecia, no discoid lesions, no active Raynaud's, no livedo reticularis, no periungual erythema. Musculoskeletal exam:  A comprehensive musculoskeletal exam was performed for all joints of each upper and lower extremity and assessed for swelling, tenderness and range of motion.  Positive results are documented as below:    Bilateral ankle tenderness and swelling  Right MTP tenderness   Bilateral knee crepitus without effusion     Joint Count 9/28/2017 6/16/2017 2/16/2017 12/19/2016 11/21/2016 10/13/2016 9/28/2016   Patient pain (0-100) - 10 60 30 50 10 5   MHAQ - 0.875 2 1.38 1.63 0.63 1   Left elbow - Tender - - 1 - - - -   Left elbow - Swollen - - 1 - - - -   Left wrist- Tender 1 - - - 1 - 1   Left wrist- Swollen - 1 1 - 1 - 1   Left 1st MCP - Tender - - - - 1 - -   Left 1st MCP - Swollen 1 1 1 1 1 - -   Left 2nd MCP - Tender - - - - 1 - -   Left 2nd MCP - Swollen - - - - 1 - -   Left 3rd MCP - Tender 1 - - 1 1 - 1   Left 3rd MCP - Swollen 1 - 1 1 1 - 1   Left 4th MCP - Tender - - - - 1 - 1   Left 4th MCP - Swollen - - - - 1 - 1   Left 5th MCP - Tender - - - - 1 - 1   Left 5th MCP - Swollen - 1 - - 1 - 1   Left thumb IP - Tender - - - - 1 - -   Left thumb IP - Swollen - - - - 1 - -   Left 2nd PIP - Tender - - - - 1 - -   Left 2nd PIP - Swollen - - - - 1 - -   Left 3rd PIP - Tender - - 1 - 1 - -   Left 3rd PIP - Swollen - - - - 1 - -   Left 4th PIP - Tender - - - - 1 - -   Left 5th PIP - Tender - - - - 1 - -   Right shoulder - Tender 1 - - - - - -   Right elbow - Tender - 1 1 - - - -   Right elbow - Swollen - 1 1 - - - 1   Right wrist- Tender 1 1 1 1 1 - 1   Right wrist- Swollen 1 1 1 1 1 - 1   Right 1st MCP - Tender 1 1 1 1 1 - 1   Right 1st MCP - Swollen 1 1 1 1 1 - 1   Right 2nd MCP - Tender - - - - 1 - 1   Right 2nd MCP - Swollen - 1 - - 1 - 1   Right 3rd MCP - Tender 1 1 1 1 1 - 1   Right 3rd MCP - Swollen 1 1 1 1 1 - 1   Right 4th MCP - Tender 1 1 - 1 1 - -   Right 4th MCP - Swollen 1 1 - 1 1 - -   Right 5th MCP - Tender 1 1 1 1 1 - 1   Right 5th MCP - Swollen 1 1 1 1 - - 1   Right thumb IP - Tender - 1 - 1 - - -   Right 2nd PIP - Tender - 1 1 - 1 - -   Right 2nd PIP - Swollen - 1 1 - 1 - -   Right 3rd PIP - Tender - 1 1 - 1 - -   Right 4th PIP - Tender 1 1 1 - 1 - -   Right 5th PIP - Tender 1 1 1 - 1 - -   Tender Joint Count (Total) 10 11 11 7 - - -   Swollen Joint Count (Total) 7 11 10 7 - - -   Physician Assessment (0-10) - 3 3 3 3 - 4   Patient Assessment (0-10) - 0.5 5.5 6 8 0 0   CDAI Total (calculated) - 25.5 29.5 23 - - -        DATA REVIEW    Laboratory     The following laboratory results were reviewed and discussed with the patient:    Office Visit on 06/16/2017   Component Date Value    WBC 06/16/2017 6.2     RBC 06/16/2017 3.80     HGB 06/16/2017 11.5     HCT 06/16/2017 35.4     MCV 06/16/2017 93     MCH 06/16/2017 30.3     [facility] 06/16/2017 32.5     RDW 06/16/2017 18.0*    PLATELET 59/26/6124 678     NEUTROPHILS 06/16/2017 64     Lymphocytes 06/16/2017 22     MONOCYTES 06/16/2017 11     EOSINOPHILS 06/16/2017 2     BASOPHILS 06/16/2017 1     ABS. NEUTROPHILS 06/16/2017 4.0     Abs Lymphocytes 06/16/2017 1.4     ABS. MONOCYTES 06/16/2017 0.7     ABS. EOSINOPHILS 06/16/2017 0.1     ABS. BASOPHILS 06/16/2017 0.0     IMMATURE GRANULOCYTES 06/16/2017 0     ABS. IMM. GRANS. 06/16/2017 0.0     Glucose 06/16/2017 186*    BUN 06/16/2017 25     Creatinine 06/16/2017 0.91     GFR est non-AA 06/16/2017 60     GFR est AA 06/16/2017 69     BUN/Creatinine ratio 06/16/2017 27     Sodium 06/16/2017 142     Potassium 06/16/2017 4.2     Chloride 06/16/2017 97     CO2 06/16/2017 21     Calcium 06/16/2017 10.7*    Protein, total 06/16/2017 7.0     Albumin 06/16/2017 4.4     GLOBULIN, TOTAL 06/16/2017 2.6     A-G Ratio 06/16/2017 1.7     Bilirubin, total 06/16/2017 0.3     Alk. phosphatase 06/16/2017 155*    AST (SGOT) 06/16/2017 25     ALT (SGPT) 06/16/2017 41*    C-Reactive Protein, Qt 06/16/2017 4.2     Sed rate (ESR) 06/16/2017 27     Uric acid 06/16/2017 8.7*    VITAMIN D, 25-HYDROXY 06/16/2017 82.3        Imaging    Musculoskeletal Ultrasound    None    Radiographs    Chest 9/28/2016: normal heart size. Is atherosclerotic change of the aorta. The lungs are clear acute process. There is calcified granuloma in the right upper lobe with calcified right hilar lymph nodes. There is moderate compression deformity of approximately the T12 vertebral body that appears chronic. There are degenerative changes of the thoracic spine. Bilateral Hand 9/28/2016: LEFT: Bones are osteopenic. No acute fracture or dislocation. Moderate radiocarpal joint osteoarthritis. Age-appropriate intercarpal and first Aia 16 joint osteoarthritis. There are erosions at the third MCP joint. No other erosion. No other joint space narrowing.  No soft tissue calcification.  RIGHT: No acute fracture. Old radius intra-articular fracture has healed. Increased now moderate radiocarpal joint osteoarthritis. Increased mild first MCP joint osteoarthritis. Increased mild-moderate first IP joint osteoarthritis. No joint space erosion or periosteal reaction. Alignment is within normal limits. Osteopenia is unchanged. No soft tissue calcification. Bilateral Foot 9/28/2016: LEFT: Bones are osteopenic. No acute fracture or dislocation. No erosion. Age-appropriate bilateral first MTP joint osteoarthritis. Minimal intertarsal osteoarthritis for age. No periosteal reaction or soft tissue calcification. RIGHT: Bones are osteopenic. No acute fracture or dislocation. No erosion. Age-appropriate bilateral first MTP joint osteoarthritis. Minimal intertarsal osteoarthritis for age. No periosteal reaction or soft tissue calcification. Right Hand 4/10/2010: showed demonstrate osteopenia. Gladystine Or is an acute T-shaped intra-articular distal radial fracture. Ulna styloid peri like avulsion is also noted. There is soft tissue swelling    CT Imaging    None    MR Imaging    None    DXA     DXA 11/23/2016: (L3 for compression fracture and L4 for spondylitic change) lumbar spine L1-L2 T score -1.7 (BMD 0.971 g/cm2), right femoral neck T score: -2.0 (0.757 g/cm2), righttotal hip T score: -1.7 (0.791 g/cm2), and distal one third left radius T score -4.6 (0.472 g/cm2). FRAX score 33.2 % probability in 10 years for major osteoporotic fracture and 21 % 10 year probability of hip fracture. Echocardiogram    Echocardiogram 8/23/2013: Left ventricle: Systolic function was normal. Ejection fraction was estimated in the range of 55 % to 60 %. There were no regional wall motion abnormalities. Doppler parameters were consistent with abnormal left ventricular relaxation (grade 1 diastolic dysfunction). Left atrium: The atrium was moderately dilated.  Atrial septum: There was a probable patent foramen ovale. Right atrium: The atrium was mildly dilated. Mitral valve: There was mild annular calcification. Aortic valve: The valve was not visualized well enough to rule out a bicuspid morphology. Transaortic velocity was increased due to increased transvalvular flow. Tricuspid valve: There was mild regurgitation. ASSESSMENT AND PLAN    This is a follow-up visit for Ms. Ledesma. 1) Seropositive Erosive Nodular Rheumatoid Arthritis. She is maintained on methotrexate (Otrexup) 25 mg every Wednesday and Orencia subcutaneous every week with good tolerance. Her disease is more active and her exam is consistent with that. Her CDAI was N/A (previously 25.5, 29.5, 23, 47, 23) with 10 tender and 7 swollen joints, including bilateral ankle involvement and right MTP. She has been on Orencia for 6 months and is actually worse than her last visit and it is safe to say that is not effective. I discussed changing her to Enbrel and she was amenable. I gave her 4 samples today and administered a dose today from her sample. I will continue methotrexate. Labs today. I will submit an order for Enbrel today. 2) Long Term Use of Immunosuppressants. The patient remains on immunomodulatory medications (methotrexate, Enbrel) and requires frequent toxicity monitoring by blood work. Respective labs were ordered (CBC and CMP). 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 She is taking calcium 1200 mg and 1000 of vitamin D daily. She is on Forteo with good tolerance.    4) Bilateral Knee Osteoarthritis. This continues to be an active issue but it improves with diclofenac gel. 5) Abnormal Gait. She is dependent on a walker to ambulate. There are no recent falls. 6) Asymptomatic Hyperuricemia. Her uric acid was 8.7 mg/dL (previously 7.2 mg/dL). There is no history of gout. 7) Vitamin D Deficiency. Her level was 82.3 (previously 59.4). She is on 1000 daily. I will check her level today.       The patient voiced understanding of the aforementioned assessment and plan. Summary of plan was provided in the After Visit Summary patient instructions.      TODAY'S ORDERS    Orders Placed This Encounter    CBC WITH AUTOMATED DIFF    METABOLIC PANEL, COMPREHENSIVE    C REACTIVE PROTEIN, QT    SED RATE (ESR)    VITAMIN D, 25 HYDROXY    URIC ACID    ENBREL SURECLICK 50 mg/mL (5.83 mL) injection     Future Appointments  Date Time Provider Prasanna Adalgisa   12/28/2017 2:40 PM MD Susanna Avendaño MD, [address] [facility]    Adult Rheumatology   Musculoskeletal Ultrasound Certified  [address] [place] [address]   [id number] [place] [address]   Phone [phone]  Fax [phone]

## 2017-09-28 NOTE — MR AVS SNAPSHOT
Visit Information Date & Time Provider Department Dept. Phone Encounter #  
 9/28/2017  3:40 PM Preethi Forman, [address] [facility] [id number] Follow-up Instructions Return in about 3 months (around 12/28/2017). Upcoming Health Maintenance Date Due  
 FOOT EXAM Q1 2/21/1945 EYE EXAM RETINAL OR DILATED Q1 2/21/1945 DTaP/Tdap/Td series (1 - Tdap) 2/21/1958 ZOSTER VACCINE AGE 60> 12/21/1996 GLAUCOMA SCREENING Q2Y 2/21/2002 MEDICARE YEARLY EXAM 2/21/2002 Pneumococcal 65+ High/Highest Risk (2 of 2 - PPSV23) 6/29/2011 BREAST CANCER SCRN MAMMOGRAM 5/19/2012 COLONOSCOPY 4/20/2016 HEMOGLOBIN A1C Q6M 9/17/2016 MICROALBUMIN Q1 3/17/2017 INFLUENZA AGE 9 TO ADULT 8/1/2017 LIPID PANEL Q1 2/7/2018 Allergies as of 9/28/2017  Review Complete On: 9/28/2017 By: Khari Anne RN Severity Noted Reaction Type Reactions Statins-hmg-coa Reductase Inhibitors  12/21/2016    Other (comments) Intolerant to statins Sulfa (Sulfonamide Antibiotics)  09/02/2009    Other (comments)  
 syncope Current Immunizations  Reviewed on 11/21/2016 Name Date Influenza High Dose Vaccine PF 11/1/2016 Influenza Vaccine Split 10/1/2011, 10/15/2010 Influenza Vaccine Whole 10/1/2004 ZZZ-RETIRED (DO NOT USE) Pneumococcal Vaccine (Unspecified Type) 6/29/2006 Not reviewed this visit You Were Diagnosed With   
  
 Codes Comments Seropositive rheumatoid arthritis of multiple sites [facility])    -  Primary ICD-10-CM: M05.79 ICD-9-CM: 714.0 Long-term use of immunosuppressant medication     ICD-10-CM: Z79.899 ICD-9-CM: V58.69 Vitamin D deficiency     ICD-10-CM: E55.9 ICD-9-CM: 268.9 Vitals BP Pulse Temp Resp Height(growth percentile) Weight(growth percentile)  144/73 (BP 1 Location: Left arm, BP Patient Position: Sitting) 92 98.1 °F (36.7 °C) (Oral) 18 5' 1\" (1.549 m) 132 lb (59.9 kg) SpO2 BMI OB Status Smoking Status 98% 24.94 kg/m2 Hysterectomy Never Smoker Vitals History BMI and BSA Data Body Mass Index Body Surface Area 24.94 kg/m 2 1.61 m 2 Preferred Pharmacy Pharmacy Name Phone [facility] [phone] Your Updated Medication List  
  
   
This list is accurate as of: 9/28/17  4:09 PM.  Always use your most recent med list.  
  
  
  
  
 alirocumab 75 mg/mL injector pen Commonly known as:  PRALUENT PEN  
1 mL by SubCUTAneous route Once every 2 weeks. amLODIPine 5 mg tablet Commonly known as:  Hibernia Emerald Take 5 mg by mouth daily. aspirin delayed-release 81 mg tablet Take 81 mg by mouth daily. BD INSULIN PEN NEEDLE UF MINI 31 gauge x 3/16\" Ndle Generic drug:  Insulin Needles (Disposable) USE AS DIRECTED WITH FORTEO PEN  
  
 carvedilol 25 mg tablet Commonly known as:  Pink Parrot Take 25 mg by mouth two (2) times a day. chlorthalidone 50 mg tablet Commonly known as:  Shearon Dayhoff Take 50 mg by mouth daily. CINNAMON PO Take 250 mg by mouth daily. clopidogrel 75 mg Tab Commonly known as:  PLAVIX Take 1 Tab by mouth daily. ENBREL SURECLICK 50 mg/mL (9.06 mL) injection Generic drug:  etanercept  
0.98 mL by SubCUTAneous route every seven (7) days for 3 doses. Indications: RHEUMATOID ARTHRITIS  
  
 MAKAYLA-C 500 mg Tab Generic drug:  ascorbate calcium Take 1,000 mg by mouth daily. FORTEO 20 mcg/dose - 600 mcg/2.4 mL Pnij injection Generic drug:  teriparatide USE 1 INJECTION (20 MCG OR 0.08ML) UNDER THE SKIN ONCE DAILY. HAIR,SKIN & NAILS PO Take  by mouth. Takes one po daily. JANUVIA 25 mg tablet Generic drug:  SITagliptin Take 50 mg by mouth daily. LECITHIN PO Take 800 mg by mouth two (2) times a day. levothyroxine 112 mcg tablet Commonly known as:  SYNTHROID Take  by mouth Daily (before breakfast). lisinopril 40 mg tablet Commonly known as:  Dorathy Massed Take 40 mg by mouth daily. MAGNESIUM MALATE Take 200 mg by mouth daily. metFORMIN 1,000 mg tablet Commonly known as:  GLUCOPHAGE Take 1 Tab by mouth two (2) times daily (with meals). methotrexate (PF) 25 mg/0.4 mL Atin Commonly known as:  OTREXUP (PF)  
25 mg by SubCUTAneous route every seven (7) days. MONOJECT TB SAFETY SYRINGE 1 mL 25 gauge x 5/8\" Syrg Generic drug:  Syringe with Needle (Disp) Use 1 syringe with methotrexate as directed. OCUVITE PO Take  by mouth. Takes one po daily. OMEGA 3 FISH OIL PO Take 300 mg by mouth daily. OTHER  
L-citrulline malate complex 750mg. Takes one po daily. potassium chloride SR 10 mEq tablet Commonly known as:  KLOR-CON 10 Take 20 mEq by mouth daily. VITAMIN D3 PO Take 1,000 Units by mouth daily. We Performed the Following C REACTIVE PROTEIN, QT [32924 CPT(R)] CBC WITH AUTOMATED DIFF [95169 CPT(R)] METABOLIC PANEL, COMPREHENSIVE [35566 CPT(R)] SED RATE (ESR) [id number] CPT(R)] URIC ACID [id number] CPT(R)] VITAMIN D, 25 HYDROXY [id number] CPT(R)] Follow-up Instructions Return in about 3 months (around 12/28/2017). Patient Instructions Dom Haney WILL START ENBREL Introducing [facility]! Richelle Cosme introduces [employer] patient portal. Now you can access parts of your medical record, email your doctor's office, and request medication refills online. 1. In your internet browser, go to [email or web address] 2. Click on the First Time User? Click Here link in the Sign In box. You will see the New Member Sign Up page. 3. Enter your [employer] Access Code exactly as it appears below. You will not need to use this code after youve completed the sign-up process.  If you do not sign up before the expiration date, you must request a new code. · [employer] Access Code: [facility] Expires: 12/27/2017  4:06 PM 
 
4. Enter the last four digits of your Social Security Number (xxxx) and Date of Birth (mm/dd/yyyy) as indicated and click Submit. You will be taken to the next sign-up page. 5. Create a [employer] ID. This will be your [employer] login ID and cannot be changed, so think of one that is secure and easy to remember. 6. Create a [employer] password. You can change your password at any time. 7. Enter your Password Reset Question and Answer. This can be used at a later time if you forget your password. 8. Enter your e-mail address. You will receive e-mail notification when new information is available in [address] 9. Click Sign Up. You can now view and download portions of your medical record. 10. Click the Download Summary menu link to download a portable copy of your medical information. If you have questions, please visit the Frequently Asked Questions section of the [employer] website. Remember, [employer] is NOT to be used for urgent needs. For medical emergencies, dial 911. Now available from your iPhone and Android! Please provide this summary of care documentation to your next provider. Your primary care clinician is listed as Franklin Jimenez. If you have any questions after today's visit, please call [phone].

## 2017-09-29 LAB
25(OH)D3+25(OH)D2 SERPL-MCNC: 67 NG/ML (ref 30–100)
ALBUMIN SERPL-MCNC: 4.1 G/DL (ref 3.5–4.7)
ALBUMIN/GLOB SERPL: 1.4 {RATIO} (ref 1.2–2.2)
ALP SERPL-CCNC: 137 IU/L (ref 39–117)
ALT SERPL-CCNC: 36 IU/L (ref 0–32)
AST SERPL-CCNC: 27 IU/L (ref 0–40)
BASOPHILS # BLD AUTO: 0 X10E3/UL (ref 0–0.2)
BASOPHILS NFR BLD AUTO: 0 %
BILIRUB SERPL-MCNC: 0.3 MG/DL (ref 0–1.2)
BUN SERPL-MCNC: 24 MG/DL (ref 8–27)
BUN/CREAT SERPL: 24 (ref 12–28)
CALCIUM SERPL-MCNC: 10.1 MG/DL (ref 8.7–10.3)
CHLORIDE SERPL-SCNC: 102 MMOL/L (ref 96–106)
CO2 SERPL-SCNC: 22 MMOL/L (ref 18–29)
CREAT SERPL-MCNC: 0.98 MG/DL (ref 0.57–1)
CRP SERPL-MCNC: 5.4 MG/L (ref 0–4.9)
EOSINOPHIL # BLD AUTO: 0.1 X10E3/UL (ref 0–0.4)
EOSINOPHIL NFR BLD AUTO: 2 %
ERYTHROCYTE [DISTWIDTH] IN BLOOD BY AUTOMATED COUNT: 19 % (ref 12.3–15.4)
ERYTHROCYTE [SEDIMENTATION RATE] IN BLOOD BY WESTERGREN METHOD: 20 MM/HR (ref 0–40)
GLOBULIN SER CALC-MCNC: 3 G/DL (ref 1.5–4.5)
GLUCOSE SERPL-MCNC: 114 MG/DL (ref 65–99)
HCT VFR BLD AUTO: 31.3 % (ref 34–46.6)
HGB BLD-MCNC: 10.3 G/DL (ref 11.1–15.9)
IMM GRANULOCYTES # BLD: 0 X10E3/UL (ref 0–0.1)
IMM GRANULOCYTES NFR BLD: 1 %
LYMPHOCYTES # BLD AUTO: 1.4 X10E3/UL (ref 0.7–3.1)
LYMPHOCYTES NFR BLD AUTO: 27 %
MCH RBC QN AUTO: 30.5 PG (ref 26.6–33)
MCHC RBC AUTO-ENTMCNC: 32.9 G/DL (ref 31.5–35.7)
MCV RBC AUTO: 93 FL (ref 79–97)
MONOCYTES # BLD AUTO: 0.6 X10E3/UL (ref 0.1–0.9)
MONOCYTES NFR BLD AUTO: 11 %
NEUTROPHILS # BLD AUTO: 3.1 X10E3/UL (ref 1.4–7)
NEUTROPHILS NFR BLD AUTO: 59 %
PLATELET # BLD AUTO: 210 X10E3/UL (ref 150–379)
POTASSIUM SERPL-SCNC: 4.8 MMOL/L (ref 3.5–5.2)
PROT SERPL-MCNC: 7.1 G/DL (ref 6–8.5)
RBC # BLD AUTO: 3.38 X10E6/UL (ref 3.77–5.28)
SODIUM SERPL-SCNC: 141 MMOL/L (ref 134–144)
URATE SERPL-MCNC: 11.7 MG/DL (ref 2.5–7.1)
WBC # BLD AUTO: 5.3 X10E3/UL (ref 3.4–10.8)

## 2017-09-29 NOTE — PROGRESS NOTES
The results were reviewed and a letter was sent. Slight anemia, consistent with inflammation. Stable chronic kidney disease. Liver enzyme ALT sightly elevated.  Inflammatory markers elevated (ALK, CRP)

## 2017-10-01 RX ORDER — TERIPARATIDE 250 UG/ML
INJECTION, SOLUTION SUBCUTANEOUS
Qty: 2.4 ML | Refills: 0 | Status: SHIPPED | OUTPATIENT
Start: 2017-10-01 | End: 2017-10-05 | Stop reason: SDUPTHER

## 2017-10-05 RX ORDER — TERIPARATIDE 250 UG/ML
INJECTION, SOLUTION SUBCUTANEOUS
Qty: 2.4 ML | Refills: 0 | Status: SHIPPED | OUTPATIENT
Start: 2017-10-05 | End: 2017-12-05 | Stop reason: SDUPTHER

## 2017-10-09 DIAGNOSIS — M05.79 SEROPOSITIVE RHEUMATOID ARTHRITIS OF MULTIPLE SITES (HCC): ICD-10-CM

## 2017-10-09 RX ORDER — FOLIC ACID 1 MG/1
TABLET ORAL
Qty: 90 TAB | Refills: 2 | Status: SHIPPED | OUTPATIENT
Start: 2017-10-09 | End: 2019-08-08

## 2017-10-10 ENCOUNTER — TELEPHONE (OUTPATIENT)
Dept: RHEUMATOLOGY | Age: 82
End: 2017-10-10

## 2017-10-10 NOTE — TELEPHONE ENCOUNTER
Med Enbrel has been approved with a $0 copay. [facility] pharmacy has contacted pt and shipment will be sent to pt on 10/19/17.

## 2017-10-16 DIAGNOSIS — I73.9 PAD (PERIPHERAL ARTERY DISEASE) (HCC): ICD-10-CM

## 2017-10-16 RX ORDER — CLOPIDOGREL BISULFATE 75 MG/1
75 TABLET ORAL DAILY
Qty: 90 TAB | Refills: 0 | Status: SHIPPED | OUTPATIENT
Start: 2017-10-16 | End: 2018-01-25 | Stop reason: SDUPTHER

## 2017-10-23 ENCOUNTER — TELEPHONE (OUTPATIENT)
Dept: RHEUMATOLOGY | Age: 82
End: 2017-10-23

## 2017-10-23 NOTE — TELEPHONE ENCOUNTER
Spoke with  Miracle Rut who stated that Mildred Monroy has a bruise/rash on her left lower abdomen and a rash on her back. He stated that she started the Enbrel about 3 weeks ago. He does not give the Enbrel injections so he is unaware if that was where the injection was given. He stated that this is the only new medication that was started recently. He is not sure how long ago that this showed up, but he noticed it yesterday. He stated that he would like to come in with Ms. Ledesma either today or tomorrow. I told him that I do not have any openings and that I would pass the message onto Dr. Nikkie Villalta and see what suggestions he might have. He state that was find, but he still wants to come in with Ms. Ledesma ASAP. Please advise.

## 2017-10-23 NOTE — TELEPHONE ENCOUNTER
Please call patient's son regarding possible reaction to Enbrel  Bruising  and rash lower back and abdomen.   [phone]

## 2017-10-24 NOTE — TELEPHONE ENCOUNTER
Spoke with pt's son and made an appt for tomorrow 10/25/17 at 2:20pm (ok'd by Dr. Arlette Marrufo). He stated an understanding.

## 2017-10-25 ENCOUNTER — OFFICE VISIT (OUTPATIENT)
Dept: RHEUMATOLOGY | Age: 82
End: 2017-10-25

## 2017-10-25 VITALS
TEMPERATURE: 98.6 F | WEIGHT: 131 LBS | RESPIRATION RATE: 18 BRPM | DIASTOLIC BLOOD PRESSURE: 65 MMHG | BODY MASS INDEX: 24.73 KG/M2 | SYSTOLIC BLOOD PRESSURE: 124 MMHG | HEIGHT: 61 IN | HEART RATE: 70 BPM

## 2017-10-25 DIAGNOSIS — B02.9 HERPES ZOSTER WITHOUT COMPLICATION: ICD-10-CM

## 2017-10-25 DIAGNOSIS — E79.0 ASYMPTOMATIC HYPERURICEMIA: ICD-10-CM

## 2017-10-25 DIAGNOSIS — N18.30 CKD (CHRONIC KIDNEY DISEASE) STAGE 3, GFR 30-59 ML/MIN (HCC): ICD-10-CM

## 2017-10-25 DIAGNOSIS — Z79.60 LONG-TERM USE OF IMMUNOSUPPRESSANT MEDICATION: ICD-10-CM

## 2017-10-25 DIAGNOSIS — M81.0 AGE-RELATED OSTEOPOROSIS WITHOUT CURRENT PATHOLOGICAL FRACTURE: ICD-10-CM

## 2017-10-25 DIAGNOSIS — E55.9 VITAMIN D DEFICIENCY: ICD-10-CM

## 2017-10-25 DIAGNOSIS — M05.79 SEROPOSITIVE RHEUMATOID ARTHRITIS OF MULTIPLE SITES (HCC): Primary | ICD-10-CM

## 2017-10-25 DIAGNOSIS — M17.0 PRIMARY OSTEOARTHRITIS OF BOTH KNEES: ICD-10-CM

## 2017-10-25 RX ORDER — VALACYCLOVIR HYDROCHLORIDE 1 G/1
1000 TABLET, FILM COATED ORAL 3 TIMES DAILY
Qty: 30 TAB | Refills: 0 | Status: SHIPPED | OUTPATIENT
Start: 2017-10-25 | End: 2017-11-04

## 2017-10-25 NOTE — PROGRESS NOTES
REASON FOR VISIT    This is an acute for Ms. Ledesma for Seropositive Erosive Nodular Rheumatoid Arthritis and Osteoporosis. Inflammatory arthritis phenotype includes:  Anti-CCP positive: yes (71)  Rheumatoid factor positive: yes (81.8)  Erosive disease: yes  Extra-articular manifestations include: rheumatoid nodules    Immunosuppression Screening  (9/28/2016): Quantiferon TB: negative  PPD:  Not performed  Hepatitis B: negative   Hepatitis C: negative    Therapy History includes:     Current DMARD therapy includes: methotrexate 25 mg subcutaneous every Wednesday, Enbrel 50 mg weekly  Prior DMARD therapy includes: methotrexate 25 mg subcutaneous, Orencia subcutaneous (3/2017-9/2017)  The following DMARDs have been ineffective: Orencia  The following DMARDs were stopped because of side effects: none    Osteoporosis Historical Synopsis     Height loss since age 29 (at least two inches): 1.5   Fracture history includes: yes (right radius and ulna, hairline in legs, L3, T10, T12 vertebral fractures)  Family history of hip fracture: no     Daily calcium intake is 1200 mg  Daily vitamin D intake is 1000 IU     Smoking history: no  Alcohol consumption: no  Prednisone history: no     Exercise: yes (walks around the house, core exercises)     Previous work-up for osteoporosis includes the following:  DEXA Scan: 11/23/2016  Vitamin 25OH D level: 67.0 (previously 82.3)  PTH: 19     Therapy History includes:     Current osteoporosis therapy includes:  Forteo (1/19/2017)  Prior osteoporosis therapy includes: Fosmax, Axel Deedee   The following osteoporosis have been ineffective: none  The following osteoporosis were stopped because of side effects: none     Immunizations:   Immunization History   Administered Date(s) Administered    Influenza High Dose Vaccine PF 11/01/2016    Influenza Vaccine Split 10/15/2010, 10/01/2011    Influenza Vaccine Whole 10/01/2004    ZZZ-RETIRED (DO NOT USE) Pneumococcal Vaccine (Unspecified Type) 06/29/2006     Active problems include:    Patient Active Problem List   Diagnosis Code    DM (diabetes mellitus) ([facility] E11.9    Hypothyroid E03.9    Colon cancer ([facility] C18.9    H/O: CVA     Microalbuminuria R80.9    Age-related osteoporosis without current pathological fracture M81.0    Essential hypertension I10    Anemia D64.9    Hyperlipidemia E78.5    Carotid bruit R09.89    Claudication ([facility] I73.9    Weakness of left arm R29.898    PAD (peripheral artery disease) ([facility]) I73.9    Weakness due to cerebrovascular accident ([facility] I63.9, R53.1    Neuropathy in diabetes ([facility] E11.40    Occlusion and stenosis of carotid artery without mention of cerebral infarction I65.29    Seropositive rheumatoid arthritis of multiple sites ([facility] M05.79    Primary osteoarthritis of both knees M17.0    Long-term use of immunosuppressant medication Z79.899    Statin intolerance Z78.9    Asymptomatic hyperuricemia E79.0    Vitamin D deficiency E55.9       HISTORY OF PRESENT ILLNESS    Ms. Fabi Hernandez returns for an acute visit. On her last visit, I continued Forteo and methotrexate 25 mg subcutaneous every Saturday (Otrexup), and changed to Enbrel subcutaneous. On 10/23/2017, her son called complaining of a bruise on her left lower abdomen and a rash on her back. She has had 3 doses. She complained of pain after her injection on 9/28/2017. This past weekend she developed a rash. Ms. Fabi Hernandez has continued her medications for arthritis (methotrexate, Enbrel) and reports good tolerance without significant side effects. Last toxicity monitoring by blood work was done on 9/28/2017 and did not reveal any significant adverse effects, Hct 31.3%, creatinine 0.98 mg/dL, eGFR 55, ALT 36 U/L (previously 41 U/L),  (previously 155). Vitamin D was 67.0 (previously 82.3). Most recent inflammatory markers from 9/28/2017 revealed a ESR 18 mm/hr (previously 6, 12 mm/hr) and CRP 1.5 mg/dL (previously 0.7, 1.4 mg/L).     The patient has not had any interval hospital admissions, infections, or surgeries. REVIEW OF SYSTEMS    A comprehensive review of systems was performed and pertinent results are documented in the HPI, review of systems is otherwise non-contributory. PAST MEDICAL HISTORY    She has a past medical history of Anemia (9/2/2009); Colon cancer ([facility] (9/2/2009); Colon cancer ([facility] (9/2/2009); DM (diabetes mellitus) ([facility] (9/2/2009); GERD (gastroesophageal reflux disease); H/O: CVA (9/2/2009); Hypothyroid (9/2/2009); Ill-defined condition; Microalbuminuria (9/2/2009); Osteoporosis (9/2/2009); Other and unspecified hyperlipidemia (1/27/2010); Rheumatoid arthritis involving ankle ([facility] (9/28/2016); Rheumatoid arthritis(714.0) (9/2/2009); Unspecified essential hypertension (9/2/2009); and Weakness due to cerebrovascular accident [facility]). She also has no past medical history of Abuse; Adult physical abuse; Arrhythmia; Asthma; CAD (coronary artery disease); Calculus of kidney; Chronic kidney disease; Chronic pain; Congestive heart failure, unspecified; Contact dermatitis and other eczema, due to unspecified cause; COPD; Depression; Headache(784.0); Liver disease; Psychotic disorder; PUD (peptic ulcer disease); Seizures ([facility] Thromboembolus ([facility] Unspecified adverse effect of anesthesia; or Unspecified sleep apnea. FAMILY HISTORY    Her family history includes Cancer in her father; Diabetes in her brother, mother, sister, and sister; Other in her sister; Stroke in her mother. SOCIAL HISTORY    She reports that she has never smoked. She has never used smokeless tobacco. She reports that she does not drink alcohol or use illicit drugs. MEDICATIONS    Current Outpatient Prescriptions   Medication Sig Dispense Refill    valACYclovir (VALTREX) 1 gram tablet Take 1 Tab by mouth three (3) times daily for 10 days. 30 Tab 0    clopidogrel (PLAVIX) 75 mg tab Take 1 Tab by mouth daily.  90 Tab 0    folic acid (FOLVITE) 1 mg tablet TAKE ONE TABLET BY MOUTH DAILY 90 Tab 2    FORTEO 20 mcg/dose - 600 mcg/2.4 mL pnij injection INJECT 0.8ML UNDER THE SKIN ONCE DAILY 2.4 mL 0    BD INSULIN PEN NEEDLE UF MINI 31 gauge x 3/16\" ndle USE AS DIRECTED WITH FORTEO PEN 12 Pen Needle 3    alirocumab (PRALUENT PEN) 75 mg/mL injector pen 1 mL by SubCUTAneous route Once every 2 weeks. 2 Syringe 11    methotrexate, PF, (OTREXUP, PF,) 25 mg/0.4 mL atIn 25 mg by SubCUTAneous route every seven (7) days. (Patient taking differently: 25 mg by SubCUTAneous route Every Thursday.) 4 Pen 11    carvedilol (COREG) 25 mg tablet Take 25 mg by mouth two (2) times a day.  potassium chloride SR (KLOR-CON 10) 10 mEq tablet Take 20 mEq by mouth daily.  amLODIPine (NORVASC) 5 mg tablet Take 5 mg by mouth daily.  MONOJECT TB SAFETY SYRINGE 1 mL 25 gauge x 5/8\" syrg Use 1 syringe with methotrexate as directed. 12 Syringe 3    levothyroxine (SYNTHROID) 112 mcg tablet Take  by mouth Daily (before breakfast).  chlorthalidone (HYGROTEN) 50 mg tablet Take 50 mg by mouth daily.  sitaGLIPtin (JANUVIA) 25 mg tablet Take 50 mg by mouth daily.  CINNAMON BARK (CINNAMON PO) Take 250 mg by mouth daily.  OTHER L-citrulline malate complex 750mg. Takes one po daily.  VIT C/VIT E/LUTEIN/MIN/OMEGA-3 (OCUVITE PO) Take  by mouth. Takes one po daily.  MAGNESIUM MALATE Take 200 mg by mouth daily.  MULTIVITAMIN WITH MINERALS (HAIR,SKIN & NAILS PO) Take  by mouth. Takes one po daily.  lisinopril (PRINIVIL, ZESTRIL) 40 mg tablet Take 40 mg by mouth daily.  aspirin delayed-release 81 mg tablet Take 81 mg by mouth daily.  metFORMIN (GLUCOPHAGE) 1,000 mg tablet Take 1 Tab by mouth two (2) times daily (with meals). 180 Tab 1    OMEGA-3 FATTY ACIDS/FISH OIL (OMEGA 3 FISH OIL PO) Take 300 mg by mouth daily.  ascorbate calcium (MAKAYLA-C) 500 mg Tab Take 1,000 mg by mouth daily.       CHOLECALCIFEROL, VITAMIN D3, (VITAMIN D-3 PO) Take 1,000 Units by mouth daily.  LECITHIN PO Take 800 mg by mouth two (2) times a day. ALLERGIES    Allergies   Allergen Reactions    Statins-Hmg-Coa Reductase Inhibitors Other (comments)     Intolerant to statins     Sulfa (Sulfonamide Antibiotics) Other (comments)     syncope       PHYSICAL EXAMINATION    Visit Vitals    /65 (BP 1 Location: Left arm, BP Patient Position: Sitting)    Pulse 70    Temp 98.6 °F (37 °C)    Resp 18    Ht 5' 1\" (1.549 m)    Wt 131 lb (59.4 kg)    BMI 24.75 kg/m2     Body mass index is 24.75 kg/(m^2). General: Patient is alert, oriented x 3, not in acute distress, son at bedside    HEENT:   Sclerae are not injected and appear moist.  Oral mucous membranes are moist, there are no ulcers present. There is no alopecia. Cardiovascular:  Heart is regular rate and rhythm, no murmurs. Chest:  Lungs are clear to auscultation bilaterally. No rhonchi, wheezes, or crackles. Extremities:  Free of clubbing, cyanosis, edema    Neurological exam:  No focal sensory deficits, muscle strength is full in upper and lower extremities     Skin exam:  There are no rashes, no alopecia, no discoid lesions, no active Raynaud's, no livedo reticularis, no periungual erythema. Pustular herpetic lesions from her left lower back dermatomally with anterior involvement    Musculoskeletal exam:  A comprehensive musculoskeletal exam was NOT performed for all joints of each upper and lower extremity and assessed for swelling, tenderness and range of motion.  Positive results are documented as below:     Joint Count 9/28/2017 6/16/2017 2/16/2017 12/19/2016 11/21/2016 10/13/2016 9/28/2016   Patient pain (0-100) (No Data) 10 60 30 50 10 5   MHAQ (No Data) 0.875 2 1.38 1.63 0.63 1   Left elbow - Tender - - 1 - - - -   Left elbow - Swollen - - 1 - - - -   Left wrist- Tender 1 - - - 1 - 1   Left wrist- Swollen - 1 1 - 1 - 1   Left 1st MCP - Tender - - - - 1 - -   Left 1st MCP - Swollen 1 1 1 1 1 - -   Left 2nd MCP - Tender - - - - 1 - -   Left 2nd MCP - Swollen - - - - 1 - -   Left 3rd MCP - Tender 1 - - 1 1 - 1   Left 3rd MCP - Swollen 1 - 1 1 1 - 1   Left 4th MCP - Tender - - - - 1 - 1   Left 4th MCP - Swollen - - - - 1 - 1   Left 5th MCP - Tender - - - - 1 - 1   Left 5th MCP - Swollen - 1 - - 1 - 1   Left thumb IP - Tender - - - - 1 - -   Left thumb IP - Swollen - - - - 1 - -   Left 2nd PIP - Tender - - - - 1 - -   Left 2nd PIP - Swollen - - - - 1 - -   Left 3rd PIP - Tender - - 1 - 1 - -   Left 3rd PIP - Swollen - - - - 1 - -   Left 4th PIP - Tender - - - - 1 - -   Left 5th PIP - Tender - - - - 1 - -   Right shoulder - Tender 1 - - - - - -   Right elbow - Tender - 1 1 - - - -   Right elbow - Swollen - 1 1 - - - 1   Right wrist- Tender 1 1 1 1 1 - 1   Right wrist- Swollen 1 1 1 1 1 - 1   Right 1st MCP - Tender 1 1 1 1 1 - 1   Right 1st MCP - Swollen 1 1 1 1 1 - 1   Right 2nd MCP - Tender - - - - 1 - 1   Right 2nd MCP - Swollen - 1 - - 1 - 1   Right 3rd MCP - Tender 1 1 1 1 1 - 1   Right 3rd MCP - Swollen 1 1 1 1 1 - 1   Right 4th MCP - Tender 1 1 - 1 1 - -   Right 4th MCP - Swollen 1 1 - 1 1 - -   Right 5th MCP - Tender 1 1 1 1 1 - 1   Right 5th MCP - Swollen 1 1 1 1 - - 1   Right thumb IP - Tender - 1 - 1 - - -   Right 2nd PIP - Tender - 1 1 - 1 - -   Right 2nd PIP - Swollen - 1 1 - 1 - -   Right 3rd PIP - Tender - 1 1 - 1 - -   Right 4th PIP - Tender 1 1 1 - 1 - -   Right 5th PIP - Tender 1 1 1 - 1 - -   Tender Joint Count (Total) 10 11 11 7 - - -   Swollen Joint Count (Total) 7 11 10 7 - - -   Physician Assessment (0-10) 4 3 3 3 3 - 4   Patient Assessment (0-10) (No Data) 0.5 5.5 6 8 0 0   CDAI Total (calculated) - 25.5 29.5 23 - - -        DATA REVIEW    Laboratory     The following laboratory results were reviewed and discussed with the patient:    Office Visit on 09/28/2017   Component Date Value    WBC 09/28/2017 5.3     RBC 09/28/2017 3.38*    HGB 09/28/2017 10.3*    HCT 09/28/2017 31.3*    MCV 09/28/2017 93     MCH 09/28/2017 30.5     MCHC 09/28/2017 32.9     RDW 09/28/2017 19.0*    PLATELET 24/90/0325 360     NEUTROPHILS 09/28/2017 59     Lymphocytes 09/28/2017 27     MONOCYTES 09/28/2017 11     EOSINOPHILS 09/28/2017 2     BASOPHILS 09/28/2017 0     ABS. NEUTROPHILS 09/28/2017 3.1     Abs Lymphocytes 09/28/2017 1.4     ABS. MONOCYTES 09/28/2017 0.6     ABS. EOSINOPHILS 09/28/2017 0.1     ABS. BASOPHILS 09/28/2017 0.0     IMMATURE GRANULOCYTES 09/28/2017 1     ABS. IMM. GRANS. 09/28/2017 0.0     Glucose 09/28/2017 114*    BUN 09/28/2017 24     Creatinine 09/28/2017 0.98     GFR est non-AA 09/28/2017 55*    GFR est AA 09/28/2017 63     BUN/Creatinine ratio 09/28/2017 24     Sodium 09/28/2017 141     Potassium 09/28/2017 4.8     Chloride 09/28/2017 102     CO2 09/28/2017 22     Calcium 09/28/2017 10.1     Protein, total 09/28/2017 7.1     Albumin 09/28/2017 4.1     GLOBULIN, TOTAL 09/28/2017 3.0     A-G Ratio 09/28/2017 1.4     Bilirubin, total 09/28/2017 0.3     Alk. phosphatase 09/28/2017 137*    AST (SGOT) 09/28/2017 27     ALT (SGPT) 09/28/2017 36*    C-Reactive Protein, Qt 09/28/2017 5.4*    Sed rate (ESR) 09/28/2017 20     VITAMIN D, 25-HYDROXY 09/28/2017 67.0     Uric acid 09/28/2017 11.7*       Imaging    Musculoskeletal Ultrasound    None    Radiographs    Chest 9/28/2016: normal heart size. Is atherosclerotic change of the aorta. The lungs are clear acute process. There is calcified granuloma in the right upper lobe with calcified right hilar lymph nodes. There is moderate compression deformity of approximately the T12 vertebral body that appears chronic. There are degenerative changes of the thoracic spine. Bilateral Hand 9/28/2016: LEFT: Bones are osteopenic. No acute fracture or dislocation. Moderate radiocarpal joint osteoarthritis. Age-appropriate intercarpal and first Aia 16 joint osteoarthritis. There are erosions at the third MCP joint. No other erosion. No other joint space narrowing. No soft tissue calcification.  RIGHT: No acute fracture. Old radius intra-articular fracture has healed. Increased now moderate radiocarpal joint osteoarthritis. Increased mild first MCP joint osteoarthritis. Increased mild-moderate first IP joint osteoarthritis. No joint space erosion or periosteal reaction. Alignment is within normal limits. Osteopenia is unchanged. No soft tissue calcification. Bilateral Foot 9/28/2016: LEFT: Bones are osteopenic. No acute fracture or dislocation. No erosion. Age-appropriate bilateral first MTP joint osteoarthritis. Minimal intertarsal osteoarthritis for age. No periosteal reaction or soft tissue calcification. RIGHT: Bones are osteopenic. No acute fracture or dislocation. No erosion. Age-appropriate bilateral first MTP joint osteoarthritis. Minimal intertarsal osteoarthritis for age. No periosteal reaction or soft tissue calcification. Right Hand 4/10/2010: showed demonstrate osteopenia. Grace Pica is an acute T-shaped intra-articular distal radial fracture. Ulna styloid peri like avulsion is also noted. There is soft tissue swelling    CT Imaging    None    MR Imaging    None    DXA     DXA 11/23/2016: (L3 for compression fracture and L4 for spondylitic change) lumbar spine L1-L2 T score -1.7 (BMD 0.971 g/cm2), right femoral neck T score: -2.0 (0.757 g/cm2), righttotal hip T score: -1.7 (0.791 g/cm2), and distal one third left radius T score -4.6 (0.472 g/cm2). FRAX score 33.2 % probability in 10 years for major osteoporotic fracture and 21 % 10 year probability of hip fracture. Echocardiogram    Echocardiogram 8/23/2013: Left ventricle: Systolic function was normal. Ejection fraction was estimated in the range of 55 % to 60 %. There were no regional wall motion abnormalities. Doppler parameters were consistent with abnormal left ventricular relaxation (grade 1 diastolic dysfunction).  Left atrium: The atrium was moderately dilated. Atrial septum: There was a probable patent foramen ovale. Right atrium: The atrium was mildly dilated. Mitral valve: There was mild annular calcification. Aortic valve: The valve was not visualized well enough to rule out a bicuspid morphology. Transaortic velocity was increased due to increased transvalvular flow. Tricuspid valve: There was mild regurgitation. ASSESSMENT AND PLAN    This is a follow-up visit for Ms. Ledesma. 1) Seropositive Erosive Nodular Rheumatoid Arthritis. She is maintained on methotrexate (Otrexup) 25 mg every Wednesday and Enbrel with good tolerance but has developed Shingles. I asked her to hold her medications and prescribed her Valtrex. Her CDAI on her last visit was N/A (previously 25.5, 29.5, 23, 47, 23) with 10 tender and 7 swollen joints, including bilateral ankle involvement and right MTP.     2) Long Term Use of Immunosuppressants. The patient remains on immunomodulatory medications (methotrexate, Enbrel) and requires frequent toxicity monitoring by blood work. Respective labs were ordered (CBC and CMP). 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 She is taking calcium 1200 mg and 1000 of vitamin D daily. She is on Forteo with good tolerance.    4) Bilateral Knee Osteoarthritis. This continues to be an active issue but it improves with diclofenac gel. 5) Abnormal Gait. She is dependent on a walker to ambulate. There are no recent falls. 6) Asymptomatic Hyperuricemia. Her uric acid was 11.7  mg/dL (previously 8.7, 7.2 mg/dL). There is no history of gout. I will consider treating due to her CKD. 7) Vitamin D Deficiency. Her level was 67.0 (previously 82.3, 59.4). She is on 1000 daily. I will check her level today. 8) Shingles. She had received the ZostaVax. I prescribed her Valtrex 1 g TID and asked her to hold methotrexate and Enbrel. 9) Chronic Kidney Disease Stage 3.  Her creatinine was 0.98 mg/dL and eGFR 55 (previous 0.91 mg/dL and eGFR 60). I will consider stopping methotrexate altogether. The patient voiced understanding of the aforementioned assessment and plan. Summary of plan was provided in the After Visit Summary patient instructions.      TODAY'S ORDERS    Orders Placed This Encounter    valACYclovir (VALTREX) 1 gram tablet     Future Appointments  Date Time Provider Prasanna Adalgisa   11/27/2017 4:20 PM Wilberto Leslie MD [address]   12/28/2017 2:40 PM MD Susanna Jeronimo MD, [address] [place] [address]    Adult Rheumatology   Musculoskeletal Ultrasound Certified  [address] [place] [address]   Phone [phone]  Fax [phone]

## 2017-10-25 NOTE — MR AVS SNAPSHOT
Visit Information Date & Time Provider Department Dept. Phone Encounter #  
 10/25/2017  2:20 PM Alma FloresBel [id number] Follow-up Instructions Return in about 4 weeks (around 11/22/2017). Your Appointments 12/28/2017  2:40 PM  
ESTABLISHED PATIENT with Alma Flores MD  
[id number] ([facility]) [facility] [place] [id number]  
[phone]  
  
   
 [facility] [place] [id number] Upcoming Health Maintenance Date Due  
 FOOT EXAM Q1 2/21/1945 EYE EXAM RETINAL OR DILATED Q1 2/21/1945 DTaP/Tdap/Td series (1 - Tdap) 2/21/1958 ZOSTER VACCINE AGE 60> 12/21/1996 GLAUCOMA SCREENING Q2Y 2/21/2002 MEDICARE YEARLY EXAM 2/21/2002 Pneumococcal 65+ High/Highest Risk (2 of 2 - PPSV23) 6/29/2011 BREAST CANCER SCRN MAMMOGRAM 5/19/2012 COLONOSCOPY 4/20/2016 HEMOGLOBIN A1C Q6M 9/17/2016 MICROALBUMIN Q1 3/17/2017 INFLUENZA AGE 9 TO ADULT 8/1/2017 LIPID PANEL Q1 2/7/2018 Allergies as of 10/25/2017  Review Complete On: 10/25/2017 By: Pippa Benito RN Severity Noted Reaction Type Reactions Statins-hmg-coa Reductase Inhibitors  12/21/2016    Other (comments) Intolerant to statins Sulfa (Sulfonamide Antibiotics)  09/02/2009    Other (comments)  
 syncope Current Immunizations  Reviewed on 11/21/2016 Name Date Influenza High Dose Vaccine PF 11/1/2016 Influenza Vaccine Split 10/1/2011, 10/15/2010 Influenza Vaccine Whole 10/1/2004 ZZZ-RETIRED (DO NOT USE) Pneumococcal Vaccine (Unspecified Type) 6/29/2006 Not reviewed this visit You Were Diagnosed With   
  
 Codes Comments Seropositive rheumatoid arthritis of multiple sites [facility])    -  Primary ICD-10-CM: M05.79 ICD-9-CM: 714.0 Long-term use of immunosuppressant medication     ICD-10-CM: Z79.899 ICD-9-CM: V58.69 Herpes zoster without complication     EHV-51-HT: B02.9 ICD-9-CM: 978. 9 Vitals BP Pulse Temp Resp Height(growth percentile) Weight(growth percentile) 124/65 (BP 1 Location: Left arm, BP Patient Position: Sitting) 70 98.6 °F (37 °C) 18 5' 1\" (1.549 m) 131 lb (59.4 kg) BMI OB Status Smoking Status 24.75 kg/m2 Hysterectomy Never Smoker BMI and BSA Data Body Mass Index Body Surface Area 24.75 kg/m 2 1.6 m 2 Preferred Pharmacy Pharmacy Name Phone [id number] [phone] Your Updated Medication List  
  
   
This list is accurate as of: 10/25/17  2:55 PM.  Always use your most recent med list.  
  
  
  
  
 alirocumab 75 mg/mL injector pen Commonly known as:  PRALUENT PEN  
1 mL by SubCUTAneous route Once every 2 weeks. amLODIPine 5 mg tablet Commonly known as:  Miladis Spruce Take 5 mg by mouth daily. aspirin delayed-release 81 mg tablet Take 81 mg by mouth daily. BD INSULIN PEN NEEDLE UF MINI 31 gauge x 3/16\" Ndle Generic drug:  Insulin Needles (Disposable) USE AS DIRECTED WITH FORTEO PEN  
  
 carvedilol 25 mg tablet Commonly known as:  Monda Lovings Take 25 mg by mouth two (2) times a day. chlorthalidone 50 mg tablet Commonly known as:  Will Soja Take 50 mg by mouth daily. CINNAMON PO Take 250 mg by mouth daily. clopidogrel 75 mg Tab Commonly known as:  PLAVIX Take 1 Tab by mouth daily. MAKAYLA-C 500 mg Tab Generic drug:  ascorbate calcium Take 1,000 mg by mouth daily. folic acid 1 mg tablet Commonly known as:  FOLVITE  
TAKE ONE TABLET BY MOUTH DAILY FORTEO 20 mcg/dose - 600 mcg/2.4 mL Pnij injection Generic drug:  teriparatide INJECT 0.8ML UNDER THE SKIN ONCE DAILY  
  
 HAIR,SKIN & NAILS PO Take  by mouth. Takes one po daily. JANUVIA 25 mg tablet Generic drug:  SITagliptin Take 50 mg by mouth daily. LECITHIN PO Take 800 mg by mouth two (2) times a day. levothyroxine 112 mcg tablet Commonly known as:  SYNTHROID Take  by mouth Daily (before breakfast). lisinopril 40 mg tablet Commonly known as:  Thersia Kubas Take 40 mg by mouth daily. MAGNESIUM MALATE Take 200 mg by mouth daily. metFORMIN 1,000 mg tablet Commonly known as:  GLUCOPHAGE Take 1 Tab by mouth two (2) times daily (with meals). methotrexate (PF) 25 mg/0.4 mL Atin Commonly known as:  OTREXUP (PF)  
25 mg by SubCUTAneous route every seven (7) days. MONOJECT TB SAFETY SYRINGE 1 mL 25 gauge x 5/8\" Syrg Generic drug:  Syringe with Needle (Disp) Use 1 syringe with methotrexate as directed. OCUVITE PO Take  by mouth. Takes one po daily. OMEGA 3 FISH OIL PO Take 300 mg by mouth daily. OTHER  
L-citrulline malate complex 750mg. Takes one po daily. potassium chloride SR 10 mEq tablet Commonly known as:  KLOR-CON 10 Take 20 mEq by mouth daily. valACYclovir 1 gram tablet Commonly known as:  VALTREX Take 1 Tab by mouth three (3) times daily for 10 days. VITAMIN D3 PO Take 1,000 Units by mouth daily. Prescriptions Sent to Pharmacy Refills  
 valACYclovir (VALTREX) 1 gram tablet 0 Sig: Take 1 Tab by mouth three (3) times daily for 10 days. Class: Normal  
 Pharmacy: [address], [address] Ph #: [phone] Route: Oral  
  
Follow-up Instructions Return in about 4 weeks (around 11/22/2017). Patient Instructions Hold methotrexate and Enbrel Start Valtrex three times daily for 10 days Introducing [facility]! Shannan Ervin introduces [employer] patient portal. Now you can access parts of your medical record, email your doctor's office, and request medication refills online. 1. In your internet browser, go to [email or web address] 2. Click on the First Time User? Click Here link in the Sign In box. You will see the New Member Sign Up page. 3. Enter your StudyEgg Access Code exactly as it appears below. You will not need to use this code after youve completed the sign-up process. If you do not sign up before the expiration date, you must request a new code. · [employer] Access Code: [facility] Expires: 12/27/2017  4:06 PM 
 
4. Enter the last four digits of your Social Security Number (xxxx) and Date of Birth (mm/dd/yyyy) as indicated and click Submit. You will be taken to the next sign-up page. 5. Create a [employer] ID. This will be your StudyEgg login ID and cannot be changed, so think of one that is secure and easy to remember. 6. Create a StudyEgg password. You can change your password at any time. 7. Enter your Password Reset Question and Answer. This can be used at a later time if you forget your password. 8. Enter your e-mail address. You will receive e-mail notification when new information is available in [address] 9. Click Sign Up. You can now view and download portions of your medical record. 10. Click the Download Summary menu link to download a portable copy of your medical information. If you have questions, please visit the Frequently Asked Questions section of the [employer]Egg website. Remember, StudyEgg is NOT to be used for urgent needs. For medical emergencies, dial 911. Now available from your iPhone and Android! Please provide this summary of care documentation to your next provider. Your primary care clinician is listed as Digna Trammell. If you have any questions after today's visit, please call [phone].

## 2017-12-05 DIAGNOSIS — E78.5 HYPERLIPIDEMIA, UNSPECIFIED HYPERLIPIDEMIA TYPE: ICD-10-CM

## 2017-12-05 NOTE — TELEPHONE ENCOUNTER
Requested Prescriptions     Signed Prescriptions Disp Refills    alirocumab (PRALUENT PEN) 75 mg/mL injector pen 2 Syringe 11     Si mL by SubCUTAneous route Once every 2 weeks.      Authorizing Provider: Baldemar Galindo     Ordering User: Calvin Ahumada     Per Dr. Nasim Clemons verbal order

## 2017-12-05 NOTE — TELEPHONE ENCOUNTER
Requested Prescriptions     Pending Prescriptions Disp Refills    alirocumab (PRALUENT PEN) 75 mg/mL injector pen 2 Syringe 11     Si mL by SubCUTAneous route Once every 2 weeks.      Last OV 16  Next OV not scheduled  Pt is out of this medication    Pharmacy verified  With refills please    Thank you, AP

## 2017-12-06 RX ORDER — TERIPARATIDE 250 UG/ML
INJECTION, SOLUTION SUBCUTANEOUS
Qty: 2.4 ML | Refills: 0 | Status: SHIPPED | OUTPATIENT
Start: 2017-12-06 | End: 2018-03-12 | Stop reason: SDUPTHER

## 2017-12-12 ENCOUNTER — TELEPHONE (OUTPATIENT)
Dept: CARDIOLOGY CLINIC | Age: 82
End: 2017-12-12

## 2017-12-20 ENCOUNTER — OFFICE VISIT (OUTPATIENT)
Dept: RHEUMATOLOGY | Age: 82
End: 2017-12-20

## 2017-12-20 VITALS
SYSTOLIC BLOOD PRESSURE: 163 MMHG | DIASTOLIC BLOOD PRESSURE: 72 MMHG | BODY MASS INDEX: 24.92 KG/M2 | TEMPERATURE: 98 F | RESPIRATION RATE: 18 BRPM | HEART RATE: 96 BPM | HEIGHT: 61 IN | WEIGHT: 132 LBS

## 2017-12-20 DIAGNOSIS — Z79.60 LONG-TERM USE OF IMMUNOSUPPRESSANT MEDICATION: ICD-10-CM

## 2017-12-20 DIAGNOSIS — E79.0 ASYMPTOMATIC HYPERURICEMIA: ICD-10-CM

## 2017-12-20 DIAGNOSIS — E55.9 VITAMIN D DEFICIENCY: ICD-10-CM

## 2017-12-20 DIAGNOSIS — M81.0 AGE-RELATED OSTEOPOROSIS WITHOUT CURRENT PATHOLOGICAL FRACTURE: ICD-10-CM

## 2017-12-20 DIAGNOSIS — M17.0 PRIMARY OSTEOARTHRITIS OF BOTH KNEES: ICD-10-CM

## 2017-12-20 DIAGNOSIS — M05.79 SEROPOSITIVE RHEUMATOID ARTHRITIS OF MULTIPLE SITES (HCC): Primary | ICD-10-CM

## 2017-12-20 DIAGNOSIS — N18.30 CKD (CHRONIC KIDNEY DISEASE) STAGE 3, GFR 30-59 ML/MIN (HCC): ICD-10-CM

## 2017-12-20 NOTE — PROGRESS NOTES
REASON FOR VISIT    This is an acute for Ms. Ledesma for Seropositive Erosive Nodular Rheumatoid Arthritis and Osteoporosis. Inflammatory arthritis phenotype includes:  Anti-CCP positive: yes (71)  Rheumatoid factor positive: yes (81.8)  Erosive disease: yes  Extra-articular manifestations include: rheumatoid nodules    Immunosuppression Screening  (9/28/2016): Quantiferon TB: negative  PPD:  Not performed  Hepatitis B: negative   Hepatitis C: negative    Therapy History includes:     Current DMARD therapy includes: methotrexate 25 mg subcutaneous every Wednesday, Enbrel 50 mg weekly (10/2017)  Prior DMARD therapy includes: methotrexate 25 mg subcutaneous, Orencia subcutaneous (3/2017-9/2017)  The following DMARDs have been ineffective: Orencia  The following DMARDs were stopped because of side effects: none    Osteoporosis Historical Synopsis     Height loss since age 29 (at least two inches): 1.5   Fracture history includes: yes (right radius and ulna, hairline in legs, L3, T10, T12 vertebral fractures)  Family history of hip fracture: no     Daily calcium intake is 1200 mg  Daily vitamin D intake is 1000 IU     Smoking history: no  Alcohol consumption: no  Prednisone history: no     Exercise: yes (walks around the house, core exercises)     Previous work-up for osteoporosis includes the following:  DEXA Scan: 11/23/2016  Vitamin 25OH D level: 67.0 (previously 82.3)  PTH: 19     Therapy History includes:     Current osteoporosis therapy includes:  Forteo (1/19/2017)  Prior osteoporosis therapy includes: Fosmax, [place]   The following osteoporosis have been ineffective: none  The following osteoporosis were stopped because of side effects: none     Immunizations:   Immunization History   Administered Date(s) Administered    Influenza High Dose Vaccine PF 11/01/2016    Influenza Vaccine Split 10/15/2010, 10/01/2011    Influenza Vaccine Whole 10/01/2004    ZZZ-RETIRED (DO NOT USE) Pneumococcal Vaccine (Unspecified Type) 2006     Active problems include:    Patient Active Problem List   Diagnosis Code    DM (diabetes mellitus) ([facility] E11.9    Hypothyroid E03.9    Colon cancer ([facility] C18.9    H/O: CVA     Microalbuminuria R80.9    Age-related osteoporosis without current pathological fracture M81.0    Essential hypertension I10    Anemia D64.9    Hyperlipidemia E78.5    Carotid bruit R09.89    Claudication ([facility]) I73.9    Weakness of left arm R29.898    PAD (peripheral artery disease) ([facility]) I73.9    Weakness due to cerebrovascular accident ([facility] I63.9, R53.1    Neuropathy in diabetes ([facility] E11.40    Occlusion and stenosis of carotid artery without mention of cerebral infarction I65.29    Seropositive rheumatoid arthritis of multiple sites ([facility] M05.79    Primary osteoarthritis of both knees M17.0    Long-term use of immunosuppressant medication Z79.899    Statin intolerance Z78.9    Asymptomatic hyperuricemia E79.0    Vitamin D deficiency E55.9    CKD (chronic kidney disease) stage 3, GFR 30-59 ml/min N18.3       HISTORY OF PRESENT ILLNESS    Ms. Radha Tao returns for an acute visit. On her last visit, she presented with Shingles. I asked her to hold methotrexate 25 mg subcutaneous every Saturday (Otrexup) and Enbrel subcutaneous. I prescribed Valtrex. I continued Forteo. She denies interval falls of fractures. Today, she reports improvement in her right hand after resuming Otrexup and Enbrel. She has residual weakness in her left hand due to a stroke. Her son reports that she is better. Her   the day before Thanksgiving from pancreatic cancer. Ms. Radha Tao has continued her medications for arthritis (methotrexate, Enbrel) and reports good tolerance without significant side effects.      Last toxicity monitoring by blood work was done on 2017 and did not reveal any significant adverse effects, Hct 31.3%, creatinine 0.98 mg/dL, eGFR 55, ALT 36 U/L (previously 41 U/L),  (previously 155). Vitamin D was 67.0 (previously 82.3). Most recent inflammatory markers from 9/28/2017 revealed a ESR 18 mm/hr (previously 6, 12 mm/hr) and CRP 1.5 mg/dL (previously 0.7, 1.4 mg/L). The patient has not had any interval hospital admissions, infections, or surgeries. REVIEW OF SYSTEMS    A comprehensive review of systems was performed and pertinent results are documented in the HPI, review of systems is otherwise non-contributory. PAST MEDICAL HISTORY    She has a past medical history of Anemia (9/2/2009); Colon cancer ([facility] (9/2/2009); Colon cancer ([facility] (9/2/2009); DM [facility]diabetes mellitus) [facility] (9/2/2009); GERD (gastroesophageal reflux disease); H/O: CVA (9/2/2009); Hypothyroid (9/2/2009); Ill-defined condition; Microalbuminuria (9/2/2009); Osteoporosis (9/2/2009); Other and unspecified hyperlipidemia (1/27/2010); Rheumatoid arthritis involving ankle ([facility] (9/28/2016); Rheumatoid arthritis(714.0) (9/2/2009); Unspecified essential hypertension (9/2/2009); and Weakness due to cerebrovascular accident [facility]). She also has no past medical history of Abuse; Adult physical abuse; Arrhythmia; Asthma; CAD (coronary artery disease); Calculus of kidney; Chronic kidney disease; Chronic pain; Congestive heart failure, unspecified; Contact dermatitis and other eczema, due to unspecified cause; COPD; Depression; Headache(784.0); Liver disease; Psychotic disorder; PUD (peptic ulcer disease); Seizures ([facility] Thromboembolus ([facility] Unspecified adverse effect of anesthesia; or Unspecified sleep apnea. FAMILY HISTORY    Her family history includes Cancer in her father; Diabetes in her brother, mother, sister, and sister; Other in her sister; Stroke in her mother. SOCIAL HISTORY    She reports that she has never smoked. She has never used smokeless tobacco. She reports that she does not drink alcohol or use illicit drugs.     MEDICATIONS    Current Outpatient Prescriptions   Medication Sig Dispense Refill    etanercept (ENBREL) 50 mg/mL (0.98 mL) injection by SubCUTAneous route.  FORTEO 20 mcg/dose - 600 mcg/2.4 mL pnij injection USE 1 INJECTION (20 MCG OR 0.08ML) UNDER THE SKIN ONCE DAILY. 2.4 mL 0    alirocumab (PRALUENT PEN) 75 mg/mL injector pen 1 mL by SubCUTAneous route Once every 2 weeks. 2 Syringe 11    clopidogrel (PLAVIX) 75 mg tab Take 1 Tab by mouth daily. 90 Tab 0    folic acid (FOLVITE) 1 mg tablet TAKE ONE TABLET BY MOUTH DAILY 90 Tab 2    BD INSULIN PEN NEEDLE UF MINI 31 gauge x 3/16\" ndle USE AS DIRECTED WITH FORTEO PEN 12 Pen Needle 3    methotrexate, PF, (OTREXUP, PF,) 25 mg/0.4 mL atIn 25 mg by SubCUTAneous route every seven (7) days. (Patient taking differently: 25 mg by SubCUTAneous route Every Thursday.) 4 Pen 11    carvedilol (COREG) 25 mg tablet Take 25 mg by mouth two (2) times a day.  potassium chloride SR (KLOR-CON 10) 10 mEq tablet Take 20 mEq by mouth daily.  amLODIPine (NORVASC) 5 mg tablet Take 5 mg by mouth daily.  MONOJECT TB SAFETY SYRINGE 1 mL 25 gauge x 5/8\" syrg Use 1 syringe with methotrexate as directed. 12 Syringe 3    levothyroxine (SYNTHROID) 112 mcg tablet Take  by mouth Daily (before breakfast).  chlorthalidone (HYGROTEN) 50 mg tablet Take 50 mg by mouth daily.  sitaGLIPtin (JANUVIA) 25 mg tablet Take 50 mg by mouth daily.  CINNAMON BARK (CINNAMON PO) Take 250 mg by mouth daily.  VIT C/VIT E/LUTEIN/MIN/OMEGA-3 (OCUVITE PO) Take  by mouth. Takes one po daily.  MAGNESIUM MALATE Take 200 mg by mouth daily.  MULTIVITAMIN WITH MINERALS (HAIR,SKIN & NAILS PO) Take  by mouth. Takes one po daily.  lisinopril (PRINIVIL, ZESTRIL) 40 mg tablet Take 40 mg by mouth daily.  aspirin delayed-release 81 mg tablet Take 81 mg by mouth daily.  metFORMIN (GLUCOPHAGE) 1,000 mg tablet Take 1 Tab by mouth two (2) times daily (with meals).  180 Tab 1    OMEGA-3 FATTY ACIDS/FISH OIL (OMEGA 3 FISH OIL PO) Take 300 mg by mouth daily.  ascorbate calcium (MAKAYLA-C) 500 mg Tab Take 1,000 mg by mouth daily.  CHOLECALCIFEROL, VITAMIN D3, (VITAMIN D-3 PO) Take 1,000 Units by mouth daily.  OTHER L-citrulline malate complex 750mg. Takes one po daily.  LECITHIN PO Take 800 mg by mouth two (2) times a day. ALLERGIES    Allergies   Allergen Reactions    Statins-Hmg-Coa Reductase Inhibitors Other (comments)     Intolerant to statins     Sulfa (Sulfonamide Antibiotics) Other (comments)     syncope       PHYSICAL EXAMINATION    Visit Vitals    /72 (BP 1 Location: Right arm, BP Patient Position: Sitting)    Pulse 96    Temp 98 °F (36.7 °C)    Resp 18    Ht 5' 1\" (1.549 m)    Wt 132 lb (59.9 kg)    BMI 24.94 kg/m2     Body mass index is 24.94 kg/(m^2). General: Patient is alert, oriented x 3, not in acute distress, son at bedside    HEENT:   Sclerae are not injected and appear moist.  Oral mucous membranes are moist, there are no ulcers present. There is no alopecia. Cardiovascular:  Heart is regular rate and rhythm, no murmurs. Chest:  Lungs are clear to auscultation bilaterally. No rhonchi, wheezes, or crackles. Extremities:  Free of clubbing, cyanosis, edema    Neurological exam:  No focal sensory deficits, muscle strength is full in upper and lower extremities     Skin exam:  There are no rashes, no alopecia, no discoid lesions, no active Raynaud's, no livedo reticularis, no periungual erythema. Post-inflammatory hyperpigmentation from Pustular herpetic lesions from her left lower back dermatomally with anterior involvement    Musculoskeletal exam:  A comprehensive musculoskeletal exam was performed for all joints of each upper and lower extremity and assessed for swelling, tenderness and range of motion.  Positive results are documented as below:    Bilateral ankle tenderness and swelling (RESOLVED on LEFT)  Right MTP tenderness (RESOLVED)  Bilateral knee crepitus without effusion     Joint Count 12/20/2017 9/28/2017 6/16/2017 2/16/2017 12/19/2016 11/21/2016 10/13/2016   Patient pain (0-100) 30 (No Data) 10 60 30 50 10   MHAQ 0.75 (No Data) 0.875 2 1.38 1.63 0.63   Left elbow - Tender - - - 1 - - -   Left elbow - Swollen - - - 1 - - -   Left wrist- Tender - 1 - - - 1 -   Left wrist- Swollen - - 1 1 - 1 -   Left 1st MCP - Tender - - - - - 1 -   Left 1st MCP - Swollen - 1 1 1 1 1 -   Left 2nd MCP - Tender - - - - - 1 -   Left 2nd MCP - Swollen - - - - - 1 -   Left 3rd MCP - Tender - 1 - - 1 1 -   Left 3rd MCP - Swollen - 1 - 1 1 1 -   Left 4th MCP - Tender - - - - - 1 -   Left 4th MCP - Swollen - - - - - 1 -   Left 5th MCP - Tender - - - - - 1 -   Left 5th MCP - Swollen - - 1 - - 1 -   Left thumb IP - Tender - - - - - 1 -   Left thumb IP - Swollen - - - - - 1 -   Left 2nd PIP - Tender - - - - - 1 -   Left 2nd PIP - Swollen - - - - - 1 -   Left 3rd PIP - Tender - - - 1 - 1 -   Left 3rd PIP - Swollen - - - - - 1 -   Left 4th PIP - Tender - - - - - 1 -   Left 5th PIP - Tender - - - - - 1 -   Right shoulder - Tender - 1 - - - - -   Right elbow - Tender - - 1 1 - - -   Right elbow - Swollen - - 1 1 - - -   Right wrist- Tender 1 1 1 1 1 1 -   Right wrist- Swollen 1 1 1 1 1 1 -   Right 1st MCP - Tender - 1 1 1 1 1 -   Right 1st MCP - Swollen - 1 1 1 1 1 -   Right 2nd MCP - Tender - - - - - 1 -   Right 2nd MCP - Swollen - - 1 - - 1 -   Right 3rd MCP - Tender 1 1 1 1 1 1 -   Right 3rd MCP - Swollen 1 1 1 1 1 1 -   Right 4th MCP - Tender 1 1 1 - 1 1 -   Right 4th MCP - Swollen 1 1 1 - 1 1 -   Right 5th MCP - Tender - 1 1 1 1 1 -   Right 5th MCP - Swollen - 1 1 1 1 - -   Right thumb IP - Tender - - 1 - 1 - -   Right 2nd PIP - Tender - - 1 1 - 1 -   Right 2nd PIP - Swollen - - 1 1 - 1 -   Right 3rd PIP - Tender - - 1 1 - 1 -   Right 4th PIP - Tender - 1 1 1 - 1 -   Right 5th PIP - Tender - 1 1 1 - 1 -   Tender Joint Count (Total) 3 10 11 11 7 - -   Swollen Joint Count (Total) 3 7 11 10 7 - - Physician Assessment (0-10) 2 4 3 3 3 3 -   Patient Assessment (0-10) 2.5 (No Data) 0.5 5.5 6 8 0   CDAI Total (calculated) 10.5 - 25.5 29.5 23 - -        DATA REVIEW    Laboratory     The following laboratory results were reviewed and discussed with the patient:    Office Visit on 09/28/2017   Component Date Value    WBC 09/28/2017 5.3     RBC 09/28/2017 3.38*    HGB 09/28/2017 10.3*    HCT 09/28/2017 31.3*    MCV 09/28/2017 93     [address] 09/28/2017 30.5     MCHC 09/28/2017 32.9     RDW 09/28/2017 19.0*    PLATELET 63/14/2741 046     NEUTROPHILS 09/28/2017 59     Lymphocytes 09/28/2017 27     MONOCYTES 09/28/2017 11     EOSINOPHILS 09/28/2017 2     BASOPHILS 09/28/2017 0     ABS. NEUTROPHILS 09/28/2017 3.1     Abs Lymphocytes 09/28/2017 1.4     ABS. MONOCYTES 09/28/2017 0.6     ABS. EOSINOPHILS 09/28/2017 0.1     ABS. BASOPHILS 09/28/2017 0.0     IMMATURE GRANULOCYTES 09/28/2017 1     ABS. IMM. GRANS. 09/28/2017 0.0     Glucose 09/28/2017 114*    BUN 09/28/2017 24     Creatinine 09/28/2017 0.98     GFR est non-AA 09/28/2017 55*    GFR est AA 09/28/2017 63     BUN/Creatinine ratio 09/28/2017 24     Sodium 09/28/2017 141     Potassium 09/28/2017 4.8     Chloride 09/28/2017 102     CO2 09/28/2017 22     Calcium 09/28/2017 10.1     Protein, total 09/28/2017 7.1     Albumin 09/28/2017 4.1     GLOBULIN, TOTAL 09/28/2017 3.0     A-G Ratio 09/28/2017 1.4     Bilirubin, total 09/28/2017 0.3     Alk. phosphatase 09/28/2017 137*    AST (SGOT) 09/28/2017 27     ALT (SGPT) 09/28/2017 36*    C-Reactive Protein, Qt 09/28/2017 5.4*    Sed rate (ESR) 09/28/2017 20     VITAMIN D, 25-HYDROXY 09/28/2017 67.0     Uric acid 09/28/2017 11.7*       Imaging    Musculoskeletal Ultrasound    None    Radiographs    Chest 9/28/2016: normal heart size. Is atherosclerotic change of the aorta. The lungs are clear acute process.  There is calcified granuloma in the right upper lobe with calcified right hilar lymph nodes. There is moderate compression deformity of approximately the T12 vertebral body that appears chronic. There are degenerative changes of the thoracic spine. Bilateral Hand 9/28/2016: LEFT: Bones are osteopenic. No acute fracture or dislocation. Moderate radiocarpal joint osteoarthritis. Age-appropriate intercarpal and [facility]. There are erosions at the third MCP joint. No other erosion. No other joint space narrowing. No soft tissue calcification.  RIGHT: No acute fracture. Old radius intra-articular fracture has healed. Increased now moderate radiocarpal joint osteoarthritis. Increased mild first MCP joint osteoarthritis. Increased mild-moderate first IP joint osteoarthritis. No joint space erosion or periosteal reaction. Alignment is within normal limits. Osteopenia is unchanged. No soft tissue calcification. Bilateral Foot 9/28/2016: LEFT: Bones are osteopenic. No acute fracture or dislocation. No erosion. Age-appropriate bilateral first MTP joint osteoarthritis. Minimal intertarsal osteoarthritis for age. No periosteal reaction or soft tissue calcification. RIGHT: Bones are osteopenic. No acute fracture or dislocation. No erosion. Age-appropriate bilateral first MTP joint osteoarthritis. Minimal intertarsal osteoarthritis for age. No periosteal reaction or soft tissue calcification. Right Hand 4/10/2010: showed demonstrate osteopenia. Hetal  is an acute T-shaped intra-articular distal radial fracture. Ulna styloid peri like avulsion is also noted. There is soft tissue swelling    CT Imaging    None    MR Imaging    None    DXA     DXA 11/23/2016: (L3 for compression fracture and L4 for spondylitic change) lumbar spine L1-L2 T score -1.7 (BMD 0.971 g/cm2), right femoral neck T score: -2.0 (0.757 g/cm2), righttotal hip T score: -1.7 (0.791 g/cm2), and distal one third left radius T score -4.6 (0.472 g/cm2).  FRAX score 33.2 % probability in 10 years for major osteoporotic fracture and 21 % 10 year probability of hip fracture. Echocardiogram    Echocardiogram 8/23/2013: Left ventricle: Systolic function was normal. Ejection fraction was estimated in the range of 55 % to 60 %. There were no regional wall motion abnormalities. Doppler parameters were consistent with abnormal left ventricular relaxation (grade 1 diastolic dysfunction). Left atrium: The atrium was moderately dilated. Atrial septum: There was a probable patent foramen ovale. Right atrium: The atrium was mildly dilated. Mitral valve: There was mild annular calcification. Aortic valve: The valve was not visualized well enough to rule out a bicuspid morphology. Transaortic velocity was increased due to increased transvalvular flow. Tricuspid valve: There was mild regurgitation. ASSESSMENT AND PLAN    This is a follow-up visit for Ms. Ledesma. 1) Seropositive Erosive Nodular Rheumatoid Arthritis. She is maintained on methotrexate (Otrexup) 25 mg every Wednesday and Enbrel with good tolerance. She feels better but has residual pain in her left hand which is remnant from a stroke. Her CDAI was 10.5 (previously 25.5, 29.5, 23, 47, 23) with 3 tender and 3 swollen joints, including right ankle involvement. I will continue treatment as is she has improved. Labs today. 2) Long Term Use of Immunosuppressants. The patient remains on immunomodulatory medications (methotrexate, Enbrel) and requires frequent toxicity monitoring by blood work. Respective labs were ordered (CBC and CMP). 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 She is taking calcium 1200 mg and 1000 of vitamin D daily. She is on Forteo with good tolerance.    4) Bilateral Knee Osteoarthritis. This continues to be an active issue but it improves with diclofenac gel. 5) Abnormal Gait. She is dependent on a walker to ambulate. There are no recent falls. 6) Asymptomatic Hyperuricemia.  Her uric acid was 11.7  mg/dL (previously 8. 7, 7.2 mg/dL). There is no history of gout. 7) Vitamin D Deficiency. Her level was 67.0 (previously 82.3, 59.4). She is on 1000 daily      8) Shingles. She had received the ZostaVax. She was treated with Valtrex 1 g TID. 9) Chronic Kidney Disease Stage 3. Her creatinine was 0.98 mg/dL and eGFR 55 (previous 0.91 mg/dL and eGFR 60). I will consider stopping methotrexate altogether. The patient voiced understanding of the aforementioned assessment and plan. Summary of plan was provided in the After Visit Summary patient instructions.      TODAY'S ORDERS    Orders Placed This Encounter    QUANTIFERON TB GOLD    CBC WITH AUTOMATED DIFF    METABOLIC PANEL, COMPREHENSIVE    CHRONIC HEPATITIS PANEL    C REACTIVE PROTEIN, QT    SED RATE (ESR)    etanercept (ENBREL) 50 mg/mL (0.98 mL) injection     Future Appointments  Date Time Provider Prasanna Diana   4/3/2018 4:00 PM Mika García  [facility] Matthew Cortes MD, [address] [place] [address]    Adult Rheumatology   Musculoskeletal Ultrasound Certified  [address]   Phone [phone]  Fax [phone]

## 2017-12-20 NOTE — MR AVS SNAPSHOT
Visit Information Date & Time Provider Department Dept. Phone Encounter #  
 12/20/2017  4:20 PM Jimmy Ferguson  [facility] [id number] Follow-up Instructions Return in about 3 months (around 3/20/2018). Your Appointments 12/28/2017  2:40 PM  
ESTABLISHED PATIENT with Jimmy Ferguson MD  
[address] ([address]) [facility] [address]  
[phone]  
  
   
 [facility] [id number] Upcoming Health Maintenance Date Due  
 FOOT EXAM Q1 2/21/1945 EYE EXAM RETINAL OR DILATED Q1 2/21/1945 DTaP/Tdap/Td series (1 - Tdap) 2/21/1958 ZOSTER VACCINE AGE 60> 12/21/1996 GLAUCOMA SCREENING Q2Y 2/21/2002 MEDICARE YEARLY EXAM 2/21/2002 Pneumococcal 65+ High/Highest Risk (2 of 2 - PPSV23) 6/29/2011 BREAST CANCER SCRN MAMMOGRAM 5/19/2012 COLONOSCOPY 4/20/2016 HEMOGLOBIN A1C Q6M 9/17/2016 MICROALBUMIN Q1 3/17/2017 Influenza Age 5 to Adult 8/1/2017 LIPID PANEL Q1 2/7/2018 Allergies as of 12/20/2017  Review Complete On: 12/20/2017 By: Jimmy Ferguson MD  
  
 Severity Noted Reaction Type Reactions Statins-hmg-coa Reductase Inhibitors  12/21/2016    Other (comments) Intolerant to statins Sulfa (Sulfonamide Antibiotics)  09/02/2009    Other (comments)  
 syncope Current Immunizations  Reviewed on 11/21/2016 Name Date Influenza High Dose Vaccine PF 11/1/2016 Influenza Vaccine Split 10/1/2011, 10/15/2010 Influenza Vaccine Whole 10/1/2004 ZZZ-RETIRED (DO NOT USE) Pneumococcal Vaccine (Unspecified Type) 6/29/2006 Not reviewed this visit You Were Diagnosed With   
  
 Codes Comments Seropositive rheumatoid arthritis of multiple sites [facility])    -  Primary ICD-10-CM: M05.79 ICD-9-CM: 714.0 Long-term use of immunosuppressant medication     ICD-10-CM: Z79.899 ICD-9-CM: V58.69 Vitals BP Pulse Temp Resp Height(growth percentile) Weight(growth percentile) 163/72 (BP 1 Location: Right arm, BP Patient Position: Sitting) 96 98 °F (36.7 °C) 18 5' 1\" (1.549 m) 132 lb (59.9 kg) BMI OB Status Smoking Status 24.94 kg/m2 Hysterectomy Never Smoker BMI and BSA Data Body Mass Index Body Surface Area 24.94 kg/m 2 1.61 m 2 Preferred Pharmacy Pharmacy Name Phone [facility] [address] [phone] Your Updated Medication List  
  
   
This list is accurate as of: 12/20/17  4:58 PM.  Always use your most recent med list.  
  
  
  
  
 alirocumab 75 mg/mL injector pen Commonly known as:  PRALUENT PEN  
1 mL by SubCUTAneous route Once every 2 weeks. amLODIPine 5 mg tablet Commonly known as:  Rabun Royals Take 5 mg by mouth daily. aspirin delayed-release 81 mg tablet Take 81 mg by mouth daily. BD INSULIN PEN NEEDLE UF MINI 31 gauge x 3/16\" Ndle Generic drug:  Insulin Needles (Disposable) USE AS DIRECTED WITH FORTEO PEN  
  
 carvedilol 25 mg tablet Commonly known as:  Mar [place] Take 25 mg by mouth two (2) times a day. chlorthalidone 50 mg tablet Commonly known as:  Martinez Dace Take 50 mg by mouth daily. CINNAMON PO Take 250 mg by mouth daily. clopidogrel 75 mg Tab Commonly known as:  PLAVIX Take 1 Tab by mouth daily. ENBREL 50 mg/mL (0.98 mL) injection Generic drug:  etanercept  
by SubCUTAneous route. MAKAYLA-C 500 mg Tab Generic drug:  ascorbate calcium Take 1,000 mg by mouth daily. folic acid 1 mg tablet Commonly known as:  FOLVITE  
TAKE ONE TABLET BY MOUTH DAILY FORTEO 20 mcg/dose - 600 mcg/2.4 mL Pnij injection Generic drug:  teriparatide USE 1 INJECTION (20 MCG OR 0.08ML) UNDER THE SKIN ONCE DAILY. HAIR,SKIN & NAILS PO Take  by mouth. Takes one po daily. JANUVIA 25 mg tablet Generic drug:  SITagliptin Take 50 mg by mouth daily. LECITHIN PO Take 800 mg by mouth two (2) times a day. levothyroxine 112 mcg tablet Commonly known as:  SYNTHROID Take  by mouth Daily (before breakfast). lisinopril 40 mg tablet Commonly known as:  Robertha Roup Take 40 mg by mouth daily. MAGNESIUM MALATE Take 200 mg by mouth daily. metFORMIN 1,000 mg tablet Commonly known as:  GLUCOPHAGE Take 1 Tab by mouth two (2) times daily (with meals). methotrexate (PF) 25 mg/0.4 mL Atin Commonly known as:  OTREXUP (PF)  
25 mg by SubCUTAneous route every seven (7) days. MONOJECT TB SAFETY SYRINGE 1 mL 25 gauge x 5/8\" Syrg Generic drug:  Syringe with Needle (Disp) Use 1 syringe with methotrexate as directed. OCUVITE PO Take  by mouth. Takes one po daily. OMEGA 3 FISH OIL PO Take 300 mg by mouth daily. OTHER  
L-citrulline malate complex 750mg. Takes one po daily. potassium chloride SR 10 mEq tablet Commonly known as:  KLOR-CON 10 Take 20 mEq by mouth daily. VITAMIN D3 PO Take 1,000 Units by mouth daily. We Performed the Following C REACTIVE PROTEIN, QT [42006 CPT(R)] CBC WITH AUTOMATED DIFF [50877 CPT(R)] CHRONIC HEPATITIS PANEL [NVV9273 Custom] METABOLIC PANEL, COMPREHENSIVE [65566 CPT(R)] QUANTIFERON TB GOLD [UVP52067 Custom] SED RATE (ESR) [id number] CPT(R)] Follow-up Instructions Return in about 3 months (around 3/20/2018). Introducing [facility]! Florence Chavez introduces [employer] patient portal. Now you can access parts of your medical record, email your doctor's office, and request medication refills online. 1. In your internet browser, go to [email or web address] 2. Click on the First Time User? Click Here link in the Sign In box. You will see the New Member Sign Up page.

## 2017-12-21 LAB
ALBUMIN SERPL-MCNC: 4 G/DL (ref 3.5–4.7)
ALBUMIN/GLOB SERPL: 1.1 {RATIO} (ref 1.2–2.2)
ALP SERPL-CCNC: 224 IU/L (ref 39–117)
ALT SERPL-CCNC: 30 IU/L (ref 0–32)
AST SERPL-CCNC: 26 IU/L (ref 0–40)
BASOPHILS # BLD AUTO: 0 X10E3/UL (ref 0–0.2)
BASOPHILS NFR BLD AUTO: 1 %
BILIRUB SERPL-MCNC: 0.3 MG/DL (ref 0–1.2)
BUN SERPL-MCNC: 25 MG/DL (ref 8–27)
BUN/CREAT SERPL: 26 (ref 12–28)
CALCIUM SERPL-MCNC: 9.7 MG/DL (ref 8.7–10.3)
CHLORIDE SERPL-SCNC: 99 MMOL/L (ref 96–106)
CO2 SERPL-SCNC: 26 MMOL/L (ref 18–29)
COMMENT, 144067: NORMAL
CREAT SERPL-MCNC: 0.95 MG/DL (ref 0.57–1)
CRP SERPL-MCNC: 6.3 MG/L (ref 0–4.9)
EOSINOPHIL # BLD AUTO: 0.2 X10E3/UL (ref 0–0.4)
EOSINOPHIL NFR BLD AUTO: 4 %
ERYTHROCYTE [DISTWIDTH] IN BLOOD BY AUTOMATED COUNT: 18.4 % (ref 12.3–15.4)
ERYTHROCYTE [SEDIMENTATION RATE] IN BLOOD BY WESTERGREN METHOD: 47 MM/HR (ref 0–40)
GLOBULIN SER CALC-MCNC: 3.5 G/DL (ref 1.5–4.5)
GLUCOSE SERPL-MCNC: 100 MG/DL (ref 65–99)
HBV CORE AB SERPL QL IA: NEGATIVE
HBV CORE IGM SERPL QL IA: NEGATIVE
HBV E AB SERPL QL IA: NEGATIVE
HBV E AG SERPL QL IA: NEGATIVE
HBV SURFACE AB SER QL: NON REACTIVE
HBV SURFACE AG SERPL QL IA: NEGATIVE
HCT VFR BLD AUTO: 32.9 % (ref 34–46.6)
HCV AB S/CO SERPL IA: 0.1 S/CO RATIO (ref 0–0.9)
HGB BLD-MCNC: 10.6 G/DL (ref 11.1–15.9)
IMM GRANULOCYTES # BLD: 0 X10E3/UL (ref 0–0.1)
IMM GRANULOCYTES NFR BLD: 0 %
LYMPHOCYTES # BLD AUTO: 2.3 X10E3/UL (ref 0.7–3.1)
LYMPHOCYTES NFR BLD AUTO: 40 %
MCH RBC QN AUTO: 28.5 PG (ref 26.6–33)
MCHC RBC AUTO-ENTMCNC: 32.2 G/DL (ref 31.5–35.7)
MCV RBC AUTO: 88 FL (ref 79–97)
MONOCYTES # BLD AUTO: 0.4 X10E3/UL (ref 0.1–0.9)
MONOCYTES NFR BLD AUTO: 7 %
NEUTROPHILS # BLD AUTO: 2.8 X10E3/UL (ref 1.4–7)
NEUTROPHILS NFR BLD AUTO: 48 %
PLATELET # BLD AUTO: 232 X10E3/UL (ref 150–379)
POTASSIUM SERPL-SCNC: 4.4 MMOL/L (ref 3.5–5.2)
PROT SERPL-MCNC: 7.5 G/DL (ref 6–8.5)
RBC # BLD AUTO: 3.72 X10E6/UL (ref 3.77–5.28)
SODIUM SERPL-SCNC: 140 MMOL/L (ref 134–144)
WBC # BLD AUTO: 5.7 X10E3/UL (ref 3.4–10.8)

## 2017-12-23 LAB
ANNOTATION COMMENT IMP: NORMAL
GAMMA INTERFERON BACKGROUND BLD IA-ACNC: 0.03 IU/ML
M TB IFN-G BLD-IMP: NEGATIVE
M TB IFN-G CD4+ BCKGRND COR BLD-ACNC: 0.02 IU/ML
M TB IFN-G CD4+ T-CELLS BLD-ACNC: 0.05 IU/ML
MITOGEN IGNF BLD-ACNC: 7.12 IU/ML
QUANTIFERON INCUBATION: NORMAL
SERVICE CMNT-IMP: NORMAL

## 2017-12-27 NOTE — PROGRESS NOTES
The results were reviewed and a letter was sent. Mild stable anemia and chronic kidney disease. Elevated inflammatory markers (CRP, ESR, ALK).  Remaining labs normal.

## 2018-01-11 ENCOUNTER — TELEPHONE (OUTPATIENT)
Dept: CARDIOLOGY CLINIC | Age: 83
End: 2018-01-11

## 2018-01-11 DIAGNOSIS — E78.49 OTHER HYPERLIPIDEMIA: Primary | ICD-10-CM

## 2018-01-11 NOTE — TELEPHONE ENCOUNTER
Enriqueta ([place]) from [facility] is calling in regards to switching this pt's medication. Pt is currently on the Praluent Pen and her son wants her to be switched to the [address] [place] [address] and would like to know if you can send over a prescription for that. Enriqueta ([place]) mentioned that the [employer] will allow one transition fill prior to getting authorization for this medication. If there are any questions please give her a call back @ [phone]. Thanks!   Marlen Rodríguez

## 2018-01-18 ENCOUNTER — TELEPHONE (OUTPATIENT)
Dept: CARDIOLOGY CLINIC | Age: 83
End: 2018-01-18

## 2018-01-18 DIAGNOSIS — E78.5 HYPERLIPIDEMIA, UNSPECIFIED HYPERLIPIDEMIA TYPE: Primary | ICD-10-CM

## 2018-01-18 NOTE — TELEPHONE ENCOUNTER
Faxed received from Dora at [facility]. She needs recent cholesterol results to help with prior authorization of Helaine Galeazzi. Feb 2017 is the most recent. Spoke to patient and sent her a labslip to go and have labs drawn.

## 2018-01-25 DIAGNOSIS — I73.9 PAD (PERIPHERAL ARTERY DISEASE) (HCC): ICD-10-CM

## 2018-01-25 RX ORDER — CLOPIDOGREL BISULFATE 75 MG/1
75 TABLET ORAL DAILY
Qty: 30 TAB | Refills: 0 | Status: SHIPPED | OUTPATIENT
Start: 2018-01-25 | End: 2018-03-05 | Stop reason: SDUPTHER

## 2018-01-25 NOTE — TELEPHONE ENCOUNTER
Requested Prescriptions     Pending Prescriptions Disp Refills    clopidogrel (PLAVIX) 75 mg tab 90 Tab 0     Sig: Take 1 Tab by mouth daily.      Last OV 2/17/17  Next OV not scheduled  Pt is out of this medication    Pharmacy verified  90 day supply    Thank you, AP

## 2018-01-30 ENCOUNTER — TELEPHONE (OUTPATIENT)
Dept: CARDIOLOGY CLINIC | Age: 83
End: 2018-01-30

## 2018-01-30 NOTE — TELEPHONE ENCOUNTER
Spoke to patient to verify that she had gotten the labslip. [facility] needs more recent cholesterol testing to try to get a pre authorization for Repatha. Patient stated she did not think she got the lab slip. She will stop by the office to  a labslip and go to the [facility] down the green.

## 2018-02-01 ENCOUNTER — TELEPHONE (OUTPATIENT)
Dept: RHEUMATOLOGY | Age: 83
End: 2018-02-01

## 2018-02-01 NOTE — TELEPHONE ENCOUNTER
Spoke with pt's son who called to give new insurance information. I told him that he will need to come by the office to have the correct information entered into our system. He stated an understanding and asked if we could send the Otrexup to the specialty pharmacy? I told him that I would send it by the end of the day today. He stated an understanding.

## 2018-02-05 ENCOUNTER — TELEPHONE (OUTPATIENT)
Dept: CARDIOLOGY CLINIC | Age: 83
End: 2018-02-05

## 2018-02-05 NOTE — TELEPHONE ENCOUNTER
Douglas from [facility] is calling for a prior auth for pt's repatha. He stated they need labs. Karly Garsia can be reached at [phone].     Thank you,  August Childress

## 2018-02-05 NOTE — TELEPHONE ENCOUNTER
Spoke to Purnima Moreira. Let him know that I have talked to her several times about going for labs.

## 2018-02-08 ENCOUNTER — TELEPHONE (OUTPATIENT)
Dept: RHEUMATOLOGY | Age: 83
End: 2018-02-08

## 2018-02-08 NOTE — TELEPHONE ENCOUNTER
Spoke with pt's son who called asking if Dr. Joellen Mejias can order an x-ray of her back because she is having a lot of back pain? He stated that she saw her PCP a few days ago and they ordered a thoraco lumbar xray to be done but he wants Dr. Joellen Mejias to order it so that he can review it, because her PCP thinks that she had a fracture due to arthritis. I told him that Dr. Joellen Mejias can see the results in the computer because he has access to [facility]. He stated an understanding and will have her get it done from the orders that were already placed by her PCP.

## 2018-02-09 ENCOUNTER — APPOINTMENT (OUTPATIENT)
Dept: CT IMAGING | Age: 83
End: 2018-02-09
Attending: STUDENT IN AN ORGANIZED HEALTH CARE EDUCATION/TRAINING PROGRAM
Payer: MEDICARE

## 2018-02-09 ENCOUNTER — OFFICE VISIT (OUTPATIENT)
Dept: NEUROLOGY | Age: 83
End: 2018-02-09

## 2018-02-09 ENCOUNTER — APPOINTMENT (OUTPATIENT)
Dept: GENERAL RADIOLOGY | Age: 83
End: 2018-02-09
Attending: STUDENT IN AN ORGANIZED HEALTH CARE EDUCATION/TRAINING PROGRAM
Payer: MEDICARE

## 2018-02-09 ENCOUNTER — HOSPITAL ENCOUNTER (EMERGENCY)
Age: 83
Discharge: HOME | End: 2018-02-09
Attending: EMERGENCY MEDICINE
Payer: MEDICARE

## 2018-02-09 VITALS
TEMPERATURE: 99 F | DIASTOLIC BLOOD PRESSURE: 51 MMHG | HEART RATE: 71 BPM | HEIGHT: 61 IN | OXYGEN SATURATION: 100 % | BODY MASS INDEX: 24.92 KG/M2 | SYSTOLIC BLOOD PRESSURE: 136 MMHG | RESPIRATION RATE: 16 BRPM | WEIGHT: 132 LBS

## 2018-02-09 VITALS
HEIGHT: 61 IN | DIASTOLIC BLOOD PRESSURE: 58 MMHG | SYSTOLIC BLOOD PRESSURE: 104 MMHG | WEIGHT: 127 LBS | HEART RATE: 90 BPM | OXYGEN SATURATION: 99 % | RESPIRATION RATE: 18 BRPM | BODY MASS INDEX: 23.98 KG/M2

## 2018-02-09 DIAGNOSIS — R53.1 WEAKNESS: ICD-10-CM

## 2018-02-09 DIAGNOSIS — J01.10 ACUTE FRONTAL SINUSITIS, RECURRENCE NOT SPECIFIED: Primary | ICD-10-CM

## 2018-02-09 DIAGNOSIS — R41.0 CONFUSION: ICD-10-CM

## 2018-02-09 LAB
ALBUMIN SERPL-MCNC: 2.3 G/DL (ref 3.5–5)
ALBUMIN/GLOB SERPL: 0.4 {RATIO} (ref 1.1–2.2)
ALP SERPL-CCNC: 283 U/L (ref 45–117)
ALT SERPL-CCNC: 42 U/L (ref 12–78)
ANION GAP SERPL CALC-SCNC: 9 MMOL/L (ref 5–15)
APPEARANCE UR: CLEAR
AST SERPL-CCNC: 47 U/L (ref 15–37)
ATRIAL RATE: 90 BPM
BACTERIA URNS QL MICRO: NEGATIVE /HPF
BASOPHILS # BLD: 0.1 K/UL (ref 0–0.1)
BASOPHILS NFR BLD: 1 % (ref 0–1)
BILIRUB SERPL-MCNC: 0.4 MG/DL (ref 0.2–1)
BILIRUB UR QL: NEGATIVE
BUN SERPL-MCNC: 31 MG/DL (ref 6–20)
BUN/CREAT SERPL: 29 (ref 12–20)
CALCIUM SERPL-MCNC: 9.6 MG/DL (ref 8.5–10.1)
CALCULATED P AXIS, ECG09: 39 DEGREES
CALCULATED R AXIS, ECG10: 35 DEGREES
CALCULATED T AXIS, ECG11: 78 DEGREES
CHLORIDE SERPL-SCNC: 102 MMOL/L (ref 97–108)
CK SERPL-CCNC: 39 U/L (ref 26–192)
CO2 SERPL-SCNC: 24 MMOL/L (ref 21–32)
COLOR UR: NORMAL
CREAT SERPL-MCNC: 1.08 MG/DL (ref 0.55–1.02)
DIAGNOSIS, 93000: NORMAL
DIFFERENTIAL METHOD BLD: ABNORMAL
EOSINOPHIL # BLD: 0.2 K/UL (ref 0–0.4)
EOSINOPHIL NFR BLD: 1 % (ref 0–7)
EPITH CASTS URNS QL MICRO: NORMAL /LPF
ERYTHROCYTE [DISTWIDTH] IN BLOOD BY AUTOMATED COUNT: 19.6 % (ref 11.5–14.5)
GLOBULIN SER CALC-MCNC: 5.9 G/DL (ref 2–4)
GLUCOSE SERPL-MCNC: 69 MG/DL (ref 65–100)
GLUCOSE UR STRIP.AUTO-MCNC: NEGATIVE MG/DL
HCT VFR BLD AUTO: 28.6 % (ref 35–47)
HEMOCCULT STL QL: NEGATIVE
HGB BLD-MCNC: 9.4 G/DL (ref 11.5–16)
HGB UR QL STRIP: NEGATIVE
HYALINE CASTS URNS QL MICRO: NORMAL /LPF (ref 0–5)
IMM GRANULOCYTES # BLD: 0.1 K/UL (ref 0–0.04)
IMM GRANULOCYTES NFR BLD AUTO: 0 % (ref 0–0.5)
KETONES UR QL STRIP.AUTO: NEGATIVE MG/DL
LEUKOCYTE ESTERASE UR QL STRIP.AUTO: NEGATIVE
LYMPHOCYTES # BLD: 2 K/UL (ref 0.8–3.5)
LYMPHOCYTES NFR BLD: 16 % (ref 12–49)
MAGNESIUM SERPL-MCNC: 1.7 MG/DL (ref 1.6–2.4)
MCH RBC QN AUTO: 28.7 PG (ref 26–34)
MCHC RBC AUTO-ENTMCNC: 32.9 G/DL (ref 30–36.5)
MCV RBC AUTO: 87.5 FL (ref 80–99)
MONOCYTES # BLD: 1.7 K/UL (ref 0–1)
MONOCYTES NFR BLD: 14 % (ref 5–13)
NEUTS SEG # BLD: 8.4 K/UL (ref 1.8–8)
NEUTS SEG NFR BLD: 68 % (ref 32–75)
NITRITE UR QL STRIP.AUTO: NEGATIVE
NRBC # BLD: 0 K/UL (ref 0–0.01)
NRBC BLD-RTO: 0 PER 100 WBC
P-R INTERVAL, ECG05: 162 MS
PH UR STRIP: 5.5 [PH] (ref 5–8)
PLATELET # BLD AUTO: 382 K/UL (ref 150–400)
PMV BLD AUTO: 10 FL (ref 8.9–12.9)
POTASSIUM SERPL-SCNC: 4.2 MMOL/L (ref 3.5–5.1)
PROT SERPL-MCNC: 8.2 G/DL (ref 6.4–8.2)
PROT UR STRIP-MCNC: NEGATIVE MG/DL
Q-T INTERVAL, ECG07: 342 MS
QRS DURATION, ECG06: 76 MS
QTC CALCULATION (BEZET), ECG08: 418 MS
RBC # BLD AUTO: 3.27 M/UL (ref 3.8–5.2)
RBC #/AREA URNS HPF: NORMAL /HPF (ref 0–5)
SODIUM SERPL-SCNC: 135 MMOL/L (ref 136–145)
SP GR UR REFRACTOMETRY: 1.02 (ref 1–1.03)
TROPONIN I SERPL-MCNC: 0.07 NG/ML
TROPONIN I SERPL-MCNC: 0.07 NG/ML
UR CULT HOLD, URHOLD: NORMAL
UROBILINOGEN UR QL STRIP.AUTO: 0.2 EU/DL (ref 0.2–1)
VENTRICULAR RATE, ECG03: 90 BPM
WBC # BLD AUTO: 12.4 K/UL (ref 3.6–11)
WBC URNS QL MICRO: NORMAL /HPF (ref 0–4)

## 2018-02-09 PROCEDURE — 82550 ASSAY OF CK (CPK): CPT | Performed by: EMERGENCY MEDICINE

## 2018-02-09 PROCEDURE — 99284 EMERGENCY DEPT VISIT MOD MDM: CPT

## 2018-02-09 PROCEDURE — 93005 ELECTROCARDIOGRAM TRACING: CPT

## 2018-02-09 PROCEDURE — 85025 COMPLETE CBC W/AUTO DIFF WBC: CPT | Performed by: EMERGENCY MEDICINE

## 2018-02-09 PROCEDURE — 72100 X-RAY EXAM L-S SPINE 2/3 VWS: CPT

## 2018-02-09 PROCEDURE — 51701 INSERT BLADDER CATHETER: CPT

## 2018-02-09 PROCEDURE — 84484 ASSAY OF TROPONIN QUANT: CPT | Performed by: EMERGENCY MEDICINE

## 2018-02-09 PROCEDURE — 74011250636 HC RX REV CODE- 250/636: Performed by: STUDENT IN AN ORGANIZED HEALTH CARE EDUCATION/TRAINING PROGRAM

## 2018-02-09 PROCEDURE — 83735 ASSAY OF MAGNESIUM: CPT | Performed by: STUDENT IN AN ORGANIZED HEALTH CARE EDUCATION/TRAINING PROGRAM

## 2018-02-09 PROCEDURE — 82272 OCCULT BLD FECES 1-3 TESTS: CPT | Performed by: EMERGENCY MEDICINE

## 2018-02-09 PROCEDURE — 77030005563 HC CATH URETH INT MMGH -A

## 2018-02-09 PROCEDURE — 70450 CT HEAD/BRAIN W/O DYE: CPT

## 2018-02-09 PROCEDURE — 87086 URINE CULTURE/COLONY COUNT: CPT | Performed by: EMERGENCY MEDICINE

## 2018-02-09 PROCEDURE — 96365 THER/PROPH/DIAG IV INF INIT: CPT

## 2018-02-09 PROCEDURE — 74011250636 HC RX REV CODE- 250/636: Performed by: EMERGENCY MEDICINE

## 2018-02-09 PROCEDURE — 74011000258 HC RX REV CODE- 258: Performed by: EMERGENCY MEDICINE

## 2018-02-09 PROCEDURE — 96361 HYDRATE IV INFUSION ADD-ON: CPT

## 2018-02-09 PROCEDURE — 36415 COLL VENOUS BLD VENIPUNCTURE: CPT | Performed by: EMERGENCY MEDICINE

## 2018-02-09 PROCEDURE — 80053 COMPREHEN METABOLIC PANEL: CPT | Performed by: EMERGENCY MEDICINE

## 2018-02-09 PROCEDURE — 81001 URINALYSIS AUTO W/SCOPE: CPT | Performed by: EMERGENCY MEDICINE

## 2018-02-09 PROCEDURE — 73030 X-RAY EXAM OF SHOULDER: CPT

## 2018-02-09 PROCEDURE — 71046 X-RAY EXAM CHEST 2 VIEWS: CPT

## 2018-02-09 RX ORDER — DOXYCYCLINE HYCLATE 100 MG
100 TABLET ORAL 2 TIMES DAILY
Qty: 14 TAB | Refills: 0 | Status: SHIPPED | OUTPATIENT
Start: 2018-02-09 | End: 2018-02-16

## 2018-02-09 RX ADMIN — SODIUM CHLORIDE 1000 ML: 900 INJECTION, SOLUTION INTRAVENOUS at 19:54

## 2018-02-09 RX ADMIN — CEFTRIAXONE 1 G: 1 INJECTION, POWDER, FOR SOLUTION INTRAMUSCULAR; INTRAVENOUS at 22:28

## 2018-02-09 NOTE — LETTER
2/9/2018 Patient:  Marlo Brenner YOB: 1935 Date of Visit: 2/9/2018 Dear Elizabeth Owen MD 
[id number] [address] [address] VIA Facsimile: [phone] 
 : 
 
 
I was requested by Elizabeth Owen MD to evaluate Ms. Laughlin Pa  for Chief Complaint Patient presents with  Neurologic Problem Acute confusion and weakness Sandra Moore I am recommending the following:  
 
Diagnoses and all orders for this visit: 
 
1. Weakness due to cerebrovascular accident [facility]) 2. Confusion 
 
 
 
---------------------------------------------------------------------------------------------------------------------- Below is my encounter: Chief Complaint Patient presents with  Neurologic Problem Acute confusion and weakness Referred by: Self-referred HPI Steph Dickey is an 81-year-old woman with multiple stroke risk factors (stroke, diabetes, hypertension, etc.) who is here with her son today for acute changes. The patient is a poor historian and cannot give me any details. The son tells me that 6 days ago she had a sudden change in her mental status such that she was not as talkative. She was easily confused. She also had a change in her demeanor which was unusual.  She has also now been unable to walk. Baseline is such that she would walk with a walker without assistance. Now she requires complete assistance. Unclear why she did not come to the hospital that day. She saw primary care and was apparently diagnosed with a UTI and has been on antibiotics but still is unable to ambulate as before. She seems more easily confused. No unusual abnormal movements. She has a history of stroke with residual left-sided weakness. She is on aspirin and Plavix daily. Review of Systems Unable to perform ROS: Mental status change Past Medical History:  
Diagnosis Date  Anemia 9/2/2009  Colon cancer ([address] 9/2/2009  
 surgery/chemo  Colon cancer ([facility] 2009  DM (diabetes mellitus) ([facility] 2009  GERD (gastroesophageal reflux disease)  H/O: CVA 2009  
 slight l sided weakness  Hypothyroid 2009  Ill-defined condition   
 seasonal allergies  Microalbuminuria 2009  Osteoporosis 2009  Other and unspecified hyperlipidemia 2010  Rheumatoid arthritis involving ankle ([facility] 2016  Rheumatoid arthritis(714.0) 2009  Unspecified essential hypertension 2009  Weakness due to cerebrovascular accident ([facility] Family History Problem Relation Age of Onset  Diabetes Mother  Stroke Mother  Diabetes Sister  Other Sister   
  fell and hit her head -  of this  Diabetes Brother  Cancer Father   
  stomach  Diabetes Sister Social History Social History  Marital status:  Spouse name: N/A  
 Number of children: N/A  
 Years of education: N/A Occupational History  Not on file. Social History Main Topics  Smoking status: Never Smoker  Smokeless tobacco: Never Used  Alcohol use No  
 Drug use: No  
 Sexual activity: Not Currently Partners: Male Other Topics Concern  Not on file Social History Narrative Current Outpatient Prescriptions Medication Sig  
 methotrexate, PF, (OTREXUP, PF,) 25 mg/0.4 mL atIn 25 mg by SubCUTAneous route Every Thursday.  clopidogrel (PLAVIX) 75 mg tab Take 1 Tab by mouth daily.  evolocumab (REPATHA SURECLICK) pen injection 1 mL by SubCUTAneous route every fourteen (14) days.  etanercept (ENBREL) 50 mg/mL (0.98 mL) injection by SubCUTAneous route.  FORTEO 20 mcg/dose - 600 mcg/2.4 mL pnij injection USE 1 INJECTION (20 MCG OR 0.08ML) UNDER THE SKIN ONCE DAILY.  alirocumab (PRALUENT PEN) 75 mg/mL injector pen 1 mL by SubCUTAneous route Once every 2 weeks.  folic acid (FOLVITE) 1 mg tablet TAKE ONE TABLET BY MOUTH DAILY  BD INSULIN PEN NEEDLE UF MINI 31 gauge x 3/16\" ndle USE AS DIRECTED WITH FORTEO PEN  
 carvedilol (COREG) 25 mg tablet Take 25 mg by mouth two (2) times a day.  potassium chloride SR (KLOR-CON 10) 10 mEq tablet Take 20 mEq by mouth daily.  amLODIPine (NORVASC) 5 mg tablet Take 5 mg by mouth daily.  MONOJECT TB SAFETY SYRINGE 1 mL 25 gauge x 5/8\" syrg Use 1 syringe with methotrexate as directed.  levothyroxine (SYNTHROID) 112 mcg tablet Take  by mouth Daily (before breakfast).  chlorthalidone (HYGROTEN) 50 mg tablet Take 50 mg by mouth daily.  sitaGLIPtin (JANUVIA) 25 mg tablet Take 50 mg by mouth daily.  CINNAMON BARK (CINNAMON PO) Take 250 mg by mouth daily.  OTHER L-citrulline malate complex 750mg. Takes one po daily.  VIT C/VIT E/LUTEIN/MIN/OMEGA-3 (OCUVITE PO) Take  by mouth. Takes one po daily.  MAGNESIUM MALATE Take 200 mg by mouth daily.  MULTIVITAMIN WITH MINERALS (HAIR,SKIN & NAILS PO) Take  by mouth. Takes one po daily.  lisinopril (PRINIVIL, ZESTRIL) 40 mg tablet Take 40 mg by mouth daily.  aspirin delayed-release 81 mg tablet Take 81 mg by mouth daily.  metFORMIN (GLUCOPHAGE) 1,000 mg tablet Take 1 Tab by mouth two (2) times daily (with meals).  LECITHIN PO Take 800 mg by mouth two (2) times a day.  OMEGA-3 FATTY ACIDS/FISH OIL (OMEGA 3 FISH OIL PO) Take 300 mg by mouth daily.  ascorbate calcium (MAKAYLA-C) 500 mg Tab Take 1,000 mg by mouth daily.  CHOLECALCIFEROL, VITAMIN D3, (VITAMIN D-3 PO) Take 1,000 Units by mouth daily. No current facility-administered medications for this visit. Allergies Allergen Reactions  Statins-Hmg-Coa Reductase Inhibitors Other (comments) Intolerant to statins  Sulfa (Sulfonamide Antibiotics) Other (comments)  
  syncope Neurologic Exam  
 
Mental Status Level of consciousness: alert Cranial Nerves CN II Visual fields full to confrontation.   
 
CN III, IV, VI  
 Pupils are equal, round, and reactive to light. Extraocular motions are normal.  
Diplopia: none CN VII Facial expression full, symmetric. Left facial weakness: central 
 
CN VIII Hearing: impaired CN XII Tongue deviation: none Right eye ptosis Motor Exam  
Muscle bulk: decreased Right arm limited evaluation due to shoulder pain Left arm 4+ left leg 4+ Right leg 5 Sensory Exam  
Light touch normal.  
 
Gait, Coordination, and Reflexes Gait Gait: (She requires assistance to stand independently. She cannot ambulate.) Coordination Romberg: positive Tremor Resting tremor: absent Trace reflexes throughout Physical Exam  
Constitutional: She appears well-developed and well-nourished. Eyes: EOM are normal. Pupils are equal, round, and reactive to light. Cardiovascular: Normal rate. Pulmonary/Chest: Effort normal.  
Neurological: She has an abnormal Romberg Test.  
Skin: Skin is warm and dry. Vitals reviewed. Visit Vitals  /58 (BP 1 Location: Right arm, BP Patient Position: Sitting)  Pulse 90  Resp 18  Ht 5' 1\" (1.549 m)  Wt 57.6 kg (127 lb)  SpO2 99%  BMI 24 kg/m2 Lab Results Component Value Date/Time WBC 5.7 12/20/2017 05:04 PM  
HGB 10.6 (L) 12/20/2017 05:04 PM  
HCT 32.9 (L) 12/20/2017 05:04 PM  
PLATELET 005 71/02/3053 05:04 PM  
MCV 88 12/20/2017 05:04 PM  
 
Lab Results Component Value Date/Time Hemoglobin A1c 6.9 (H) 11/30/2011 08:30 AM  
Hemoglobin A1c 6.7 (H) 07/25/2011 08:44 AM  
Hemoglobin A1c 6.8 (H) 06/17/2010 09:33 AM  
Glucose 100 (H) 12/20/2017 05:04 PM  
Glucose (POC) 136 (H) 04/19/2010 05:04 PM  
Microalbumin/Creat ratio (mg/g creat) 34 (H) 06/17/2010 08:58 AM  
Microalbumin,urine random 1.92 06/17/2010 08:58 AM  
LDL, calculated 128 (H) 02/07/2017 09:07 AM  
Creatinine 0.95 12/20/2017 05:04 PM  
  
Lab Results Component Value Date/Time Cholesterol, total 206 (H) 02/07/2017 09:07 AM  
 HDL Cholesterol 36 (L) 02/07/2017 09:07 AM  
LDL, calculated 128 (H) 02/07/2017 09:07 AM  
Triglyceride 209 (H) 02/07/2017 09:07 AM  
CHOL/HDL Ratio 3.2 06/17/2010 09:33 AM  
 
Lab Results Component Value Date/Time ALT (SGPT) 30 12/20/2017 05:04 PM  
AST (SGOT) 26 12/20/2017 05:04 PM  
Alk. phosphatase 224 (H) 12/20/2017 05:04 PM  
Bilirubin, direct 0.13 11/23/2012 09:09 AM  
Bilirubin, total 0.3 12/20/2017 05:04 PM  
Albumin 4.0 12/20/2017 05:04 PM  
Protein, total 7.5 12/20/2017 05:04 PM  
PLATELET 072 70/19/5378 05:04 PM  
 
  
 
 
 
Assessment and Plan Diagnoses and all orders for this visit: 
 
1. Weakness due to cerebrovascular accident [facility]) 2. Confusion 56-year-old woman with multiple stroke risk factors who had an acute change in her mental status and physical strength 6 days ago and has not returned to her baseline. The son does think her mentation may be a little improved but still not like it was. He is adamant there was a distinct change 6 days ago. On examination she is diffusely weak. She does have subtle left-sided weakness more than right consistent with her history of stroke. Given her risk factors and presentation I do think she needs to have further evaluation in the hospital to include neuroimaging, carotid ultrasound, TTE.  OT and PT evaluations for possible rehabilitation. Continue aspirin and Plavix as is. Conduct stroke workup. Please consult neurology to see her tomorrow. I would like to see her once she is released in the hospital and rehabilitation. Thank you for giving me the opportunity to assist in the care of Ms. Jace Cain. If you have questions, please do not hesitate to contact me. Sincerely, [address], DO Neurologist 
D[employer]ate ADRIANA

## 2018-02-09 NOTE — COMMUNICATION BODY
Chief Complaint   Patient presents with    Neurologic Problem     Acute confusion and weakness       Referred by: Self-referred      HPI    Arturo Dai is an 16-year-old woman with multiple stroke risk factors (stroke, diabetes, hypertension, etc.) who is here with her son today for acute changes. The patient is a poor historian and cannot give me any details. The son tells me that 6 days ago she had a sudden change in her mental status such that she was not as talkative. She was easily confused. She also had a change in her demeanor which was unusual.  She has also now been unable to walk. Baseline is such that she would walk with a walker without assistance. Now she requires complete assistance. Unclear why she did not come to the hospital that day. She saw primary care and was apparently diagnosed with a UTI and has been on antibiotics but still is unable to ambulate as before. She seems more easily confused. No unusual abnormal movements. She has a history of stroke with residual left-sided weakness. She is on aspirin and Plavix daily.       Review of Systems   Unable to perform ROS: Mental status change       Past Medical History:   Diagnosis Date    Anemia 9/2/2009    Colon cancer ([address] 9/2/2009    surgery/chemo    Colon cancer ([address] 9/2/2009    DM (diabetes mellitus) ([address] 9/2/2009    GERD (gastroesophageal reflux disease)     H/O: CVA 9/2/2009    slight l sided weakness    Hypothyroid 9/2/2009    Ill-defined condition     seasonal allergies    Microalbuminuria 9/2/2009    Osteoporosis 9/2/2009    Other and unspecified hyperlipidemia 1/27/2010    Rheumatoid arthritis involving ankle ([address] 9/28/2016    Rheumatoid arthritis(714.0) 9/2/2009    Unspecified essential hypertension 9/2/2009    Weakness due to cerebrovascular accident [facility])      Family History   Problem Relation Age of Onset    Diabetes Mother     Stroke Mother     Diabetes Sister     Other Sister      fell and hit her head -  of this   Elfida Slay Diabetes Brother     Cancer Father      stomach    Diabetes Sister      Social History     Social History    Marital status:      Spouse name: N/A    Number of children: N/A    Years of education: N/A     Occupational History    Not on file. Social History Main Topics    Smoking status: Never Smoker    Smokeless tobacco: Never Used    Alcohol use No    Drug use: No    Sexual activity: Not Currently     Partners: Male     Other Topics Concern    Not on file     Social History Narrative     Current Outpatient Prescriptions   Medication Sig    methotrexate, PF, (OTREXUP, PF,) 25 mg/0.4 mL atIn 25 mg by SubCUTAneous route Every Thursday.  clopidogrel (PLAVIX) 75 mg tab Take 1 Tab by mouth daily.  evolocumab (REPATHA SURECLICK) pen injection 1 mL by SubCUTAneous route every fourteen (14) days.  etanercept (ENBREL) 50 mg/mL (0.98 mL) injection by SubCUTAneous route.  FORTEO 20 mcg/dose - 600 mcg/2.4 mL pnij injection USE 1 INJECTION (20 MCG OR 0.08ML) UNDER THE SKIN ONCE DAILY.  alirocumab (PRALUENT PEN) 75 mg/mL injector pen 1 mL by SubCUTAneous route Once every 2 weeks.  folic acid (FOLVITE) 1 mg tablet TAKE ONE TABLET BY MOUTH DAILY    BD INSULIN PEN NEEDLE UF MINI 31 gauge x 3/16\" ndle USE AS DIRECTED WITH FORTEO PEN    carvedilol (COREG) 25 mg tablet Take 25 mg by mouth two (2) times a day.  potassium chloride SR (KLOR-CON 10) 10 mEq tablet Take 20 mEq by mouth daily.  amLODIPine (NORVASC) 5 mg tablet Take 5 mg by mouth daily.  MONOJECT TB SAFETY SYRINGE 1 mL 25 gauge x 5/8\" syrg Use 1 syringe with methotrexate as directed.  levothyroxine (SYNTHROID) 112 mcg tablet Take  by mouth Daily (before breakfast).  chlorthalidone (HYGROTEN) 50 mg tablet Take 50 mg by mouth daily.  sitaGLIPtin (JANUVIA) 25 mg tablet Take 50 mg by mouth daily.  CINNAMON BARK (CINNAMON PO) Take 250 mg by mouth daily.  OTHER L-citrulline malate complex 750mg. Takes one po daily.  VIT C/VIT E/LUTEIN/MIN/OMEGA-3 (OCUVITE PO) Take  by mouth. Takes one po daily.  MAGNESIUM MALATE Take 200 mg by mouth daily.  MULTIVITAMIN WITH MINERALS (HAIR,SKIN & NAILS PO) Take  by mouth. Takes one po daily.  lisinopril (PRINIVIL, ZESTRIL) 40 mg tablet Take 40 mg by mouth daily.  aspirin delayed-release 81 mg tablet Take 81 mg by mouth daily.  metFORMIN (GLUCOPHAGE) 1,000 mg tablet Take 1 Tab by mouth two (2) times daily (with meals).  LECITHIN PO Take 800 mg by mouth two (2) times a day.  OMEGA-3 FATTY ACIDS/FISH OIL (OMEGA 3 FISH OIL PO) Take 300 mg by mouth daily.  ascorbate calcium (MAKALYA-C) 500 mg Tab Take 1,000 mg by mouth daily.  CHOLECALCIFEROL, VITAMIN D3, (VITAMIN D-3 PO) Take 1,000 Units by mouth daily. No current facility-administered medications for this visit. Allergies   Allergen Reactions    Statins-Hmg-Coa Reductase Inhibitors Other (comments)     Intolerant to statins     Sulfa (Sulfonamide Antibiotics) Other (comments)     syncope         Neurologic Exam     Mental Status   Level of consciousness: alert    Cranial Nerves     CN II   Visual fields full to confrontation. CN III, IV, VI   Pupils are equal, round, and reactive to light. Extraocular motions are normal.   Diplopia: none    CN VII   Facial expression full, symmetric. Left facial weakness: central    CN VIII   Hearing: impaired    CN XII   Tongue deviation: none       Right eye ptosis     Motor Exam   Muscle bulk: decreased       Right arm limited evaluation due to shoulder pain  Left arm 4+ left leg 4+  Right leg 5     Sensory Exam   Light touch normal.     Gait, Coordination, and Reflexes     Gait  Gait: (She requires assistance to stand independently. She cannot ambulate.)    Coordination   Romberg: positive    Tremor   Resting tremor: absent       Trace reflexes throughout     Physical Exam   Constitutional: She appears well-developed and well-nourished. Eyes: EOM are normal. Pupils are equal, round, and reactive to light. Cardiovascular: Normal rate. Pulmonary/Chest: Effort normal.   Neurological: She has an abnormal Romberg Test.   Skin: Skin is warm and dry. Vitals reviewed. Visit Vitals    /58 (BP 1 Location: Right arm, BP Patient Position: Sitting)    Pulse 90    Resp 18    Ht 5' 1\" (1.549 m)    Wt 57.6 kg (127 lb)    SpO2 99%    BMI 24 kg/m2       Lab Results  Component Value Date/Time   WBC 5.7 12/20/2017 05:04 PM   HGB 10.6 (L) 12/20/2017 05:04 PM   HCT 32.9 (L) 12/20/2017 05:04 PM   PLATELET 401 00/79/2375 05:04 PM   MCV 88 12/20/2017 05:04 PM     Lab Results  Component Value Date/Time   Hemoglobin A1c 6.9 (H) 11/30/2011 08:30 AM   Hemoglobin A1c 6.7 (H) 07/25/2011 08:44 AM   Hemoglobin A1c 6.8 (H) 06/17/2010 09:33 AM   Glucose 100 (H) 12/20/2017 05:04 PM   Glucose (POC) 136 (H) 04/19/2010 05:04 PM   Microalbumin/Creat ratio (mg/g creat) 34 (H) 06/17/2010 08:58 AM   Microalbumin,urine random 1.92 06/17/2010 08:58 AM   LDL, calculated 128 (H) 02/07/2017 09:07 AM   Creatinine 0.95 12/20/2017 05:04 PM      Lab Results  Component Value Date/Time   Cholesterol, total 206 (H) 02/07/2017 09:07 AM   HDL Cholesterol 36 (L) 02/07/2017 09:07 AM   LDL, calculated 128 (H) 02/07/2017 09:07 AM   Triglyceride 209 (H) 02/07/2017 09:07 AM   CHOL/HDL Ratio 3.2 06/17/2010 09:33 AM     Lab Results  Component Value Date/Time   ALT (SGPT) 30 12/20/2017 05:04 PM   AST (SGOT) 26 12/20/2017 05:04 PM   Alk. phosphatase 224 (H) 12/20/2017 05:04 PM   Bilirubin, direct 0.13 11/23/2012 09:09 AM   Bilirubin, total 0.3 12/20/2017 05:04 PM   Albumin 4.0 12/20/2017 05:04 PM   Protein, total 7.5 12/20/2017 05:04 PM   PLATELET 424 79/38/7177 05:04 PM              Assessment and Plan   Diagnoses and all orders for this visit:    1. Weakness due to cerebrovascular accident [address] [address]    2.  Confusion      82-year-old woman with multiple stroke risk factors who had an acute change in her mental status and physical strength 6 days ago and has not returned to her baseline. The son does think her mentation may be a little improved but still not like it was. He is adamant there was a distinct change 6 days ago. On examination she is diffusely weak. She does have subtle left-sided weakness more than right consistent with her history of stroke. Given her risk factors and presentation I do think she needs to have further evaluation in the hospital to include neuroimaging, carotid ultrasound, TTE.  OT and PT evaluations for possible rehabilitation. Continue aspirin and Plavix as is. Conduct stroke workup. Please consult neurology to see her tomorrow. I would like to see her once she is released in the hospital and rehabilitation. A notice of this visit/encounter being completed has been sent electronically to the patient's PCP and/or referring provider.      2 [facility], [facility] [address]  Diplomate ADRIANA

## 2018-02-09 NOTE — PROGRESS NOTES
Pt here for weakness, back pain lethargy and confusion. Son states he was out of town for a couple of days but when he returned there was a change. She looked different, was not answering question appropriately. Saw pcp who dx her with UTI and is being treated. There has been some improvement since starting atb. Seeing Rheum on Tues. Hx of lumbar fx which may have gotten worse over past 2 weeks ago. Has upcoming appts with ENT and Audiologist.  Pt had a stroke in 2002. Pt does have an advanced directive.

## 2018-02-09 NOTE — PROGRESS NOTES
Chief Complaint   Patient presents with    Neurologic Problem     Acute confusion and weakness       Referred by: Self-referred      HPI    Jean Ch is an 82-year-old woman with multiple stroke risk factors (stroke, diabetes, hypertension, etc.) who is here with her son today for acute changes. The patient is a poor historian and cannot give me any details. The son tells me that 6 days ago she had a sudden change in her mental status such that she was not as talkative. She was easily confused. She also had a change in her demeanor which was unusual.  She has also now been unable to walk. Baseline is such that she would walk with a walker without assistance. Now she requires complete assistance. Unclear why she did not come to the hospital that day. She saw primary care and was apparently diagnosed with a UTI and has been on antibiotics but still is unable to ambulate as before. She seems more easily confused. No unusual abnormal movements. She has a history of stroke with residual left-sided weakness. She is on aspirin and Plavix daily.       Review of Systems   Unable to perform ROS: Mental status change       Past Medical History:   Diagnosis Date    Anemia 9/2/2009    Colon cancer ([address] 9/2/2009    surgery/chemo    Colon cancer ([address] 9/2/2009    DM (diabetes mellitus) ([address] 9/2/2009    GERD (gastroesophageal reflux disease)     H/O: CVA 9/2/2009    slight l sided weakness    Hypothyroid 9/2/2009    Ill-defined condition     seasonal allergies    Microalbuminuria 9/2/2009    Osteoporosis 9/2/2009    Other and unspecified hyperlipidemia 1/27/2010    Rheumatoid arthritis involving ankle ([address] 9/28/2016    Rheumatoid arthritis(714.0) 9/2/2009    Unspecified essential hypertension 9/2/2009    Weakness due to cerebrovascular accident [facility])      Family History   Problem Relation Age of Onset    Diabetes Mother     Stroke Mother     Diabetes Sister     Other Sister      fell and hit her head -  of this   [address] [facility] [address] Diabetes Brother     Cancer Father      stomach    Diabetes Sister      Social History     Social History    Marital status:      Spouse name: N/A    Number of children: N/A    Years of education: N/A     Occupational History    Not on file. Social History Main Topics    Smoking status: Never Smoker    Smokeless tobacco: Never Used    Alcohol use No    Drug use: No    Sexual activity: Not Currently     Partners: Male     Other Topics Concern    Not on file     Social History Narrative     Current Outpatient Prescriptions   Medication Sig    methotrexate, PF, (OTREXUP, PF,) 25 mg/0.4 mL atIn 25 mg by SubCUTAneous route Every Thursday.  clopidogrel (PLAVIX) 75 mg tab Take 1 Tab by mouth daily.  evolocumab (REPATHA SURECLICK) pen injection 1 mL by SubCUTAneous route every fourteen (14) days.  etanercept (ENBREL) 50 mg/mL (0.98 mL) injection by SubCUTAneous route.  FORTEO 20 mcg/dose - 600 mcg/2.4 mL pnij injection USE 1 INJECTION (20 MCG OR 0.08ML) UNDER THE SKIN ONCE DAILY.  alirocumab (PRALUENT PEN) 75 mg/mL injector pen 1 mL by SubCUTAneous route Once every 2 weeks.  folic acid (FOLVITE) 1 mg tablet TAKE ONE TABLET BY MOUTH DAILY    BD INSULIN PEN NEEDLE UF MINI 31 gauge x 316\" ndle USE AS DIRECTED WITH FORTEO PEN    carvedilol (COREG) 25 mg tablet Take 25 mg by mouth two (2) times a day.  potassium chloride SR (KLOR-CON 10) 10 mEq tablet Take 20 mEq by mouth daily.  amLODIPine (NORVASC) 5 mg tablet Take 5 mg by mouth daily.  MONOJECT TB SAFETY SYRINGE 1 mL 25 gauge x 5/\" syrg Use 1 syringe with methotrexate as directed.  levothyroxine (SYNTHROID) 112 mcg tablet Take  by mouth Daily (before breakfast).  chlorthalidone (HYGROTEN) 50 mg tablet Take 50 mg by mouth daily.  sitaGLIPtin (JANUVIA) 25 mg tablet Take 50 mg by mouth daily.  CINNAMON BARK (CINNAMON PO) Take 250 mg by mouth daily.  OTHER L-citrulline malate complex 750mg. Takes one po daily.  VIT C/VIT E/LUTEIN/MIN/OMEGA-3 (OCUVITE PO) Take  by mouth. Takes one po daily.  MAGNESIUM MALATE Take 200 mg by mouth daily.  MULTIVITAMIN WITH MINERALS (HAIR,SKIN & NAILS PO) Take  by mouth. Takes one po daily.  lisinopril (PRINIVIL, ZESTRIL) 40 mg tablet Take 40 mg by mouth daily.  aspirin delayed-release 81 mg tablet Take 81 mg by mouth daily.  metFORMIN (GLUCOPHAGE) 1,000 mg tablet Take 1 Tab by mouth two (2) times daily (with meals).  LECITHIN PO Take 800 mg by mouth two (2) times a day.  OMEGA-3 FATTY ACIDS/FISH OIL (OMEGA 3 FISH OIL PO) Take 300 mg by mouth daily.  ascorbate calcium (MAKAYLA-C) 500 mg Tab Take 1,000 mg by mouth daily.  CHOLECALCIFEROL, VITAMIN D3, (VITAMIN D-3 PO) Take 1,000 Units by mouth daily. No current facility-administered medications for this visit. Allergies   Allergen Reactions    Statins-Hmg-Coa Reductase Inhibitors Other (comments)     Intolerant to statins     Sulfa (Sulfonamide Antibiotics) Other (comments)     syncope         Neurologic Exam     Mental Status   Level of consciousness: alert    Cranial Nerves     CN II   Visual fields full to confrontation. CN III, IV, VI   Pupils are equal, round, and reactive to light. Extraocular motions are normal.   Diplopia: none    CN VII   Facial expression full, symmetric. Left facial weakness: central    CN VIII   Hearing: impaired    CN XII   Tongue deviation: none       Right eye ptosis     Motor Exam   Muscle bulk: decreased       Right arm limited evaluation due to shoulder pain  Left arm 4+ left leg 4+  Right leg 5     Sensory Exam   Light touch normal.     Gait, Coordination, and Reflexes     Gait  Gait: (She requires assistance to stand independently. She cannot ambulate.)    Coordination   Romberg: positive    Tremor   Resting tremor: absent       Trace reflexes throughout     Physical Exam   Constitutional: She appears well-developed and well-nourished. Eyes: EOM are normal. Pupils are equal, round, and reactive to light. Cardiovascular: Normal rate. Pulmonary/Chest: Effort normal.   Neurological: She has an abnormal Romberg Test.   Skin: Skin is warm and dry. Vitals reviewed. Visit Vitals    /58 (BP 1 Location: Right arm, BP Patient Position: Sitting)    Pulse 90    Resp 18    Ht 5' 1\" (1.549 m)    Wt 57.6 kg (127 lb)    SpO2 99%    BMI 24 kg/m2       Lab Results  Component Value Date/Time   WBC 5.7 12/20/2017 05:04 PM   HGB 10.6 (L) 12/20/2017 05:04 PM   HCT 32.9 (L) 12/20/2017 05:04 PM   PLATELET 526 63/04/9140 05:04 PM   MCV 88 12/20/2017 05:04 PM     Lab Results  Component Value Date/Time   Hemoglobin A1c 6.9 (H) 11/30/2011 08:30 AM   Hemoglobin A1c 6.7 (H) 07/25/2011 08:44 AM   Hemoglobin A1c 6.8 (H) 06/17/2010 09:33 AM   Glucose 100 (H) 12/20/2017 05:04 PM   Glucose (POC) 136 (H) 04/19/2010 05:04 PM   Microalbumin/Creat ratio (mg/g creat) 34 (H) 06/17/2010 08:58 AM   Microalbumin,urine random 1.92 06/17/2010 08:58 AM   LDL, calculated 128 (H) 02/07/2017 09:07 AM   Creatinine 0.95 12/20/2017 05:04 PM      Lab Results  Component Value Date/Time   Cholesterol, total 206 (H) 02/07/2017 09:07 AM   HDL Cholesterol 36 (L) 02/07/2017 09:07 AM   LDL, calculated 128 (H) 02/07/2017 09:07 AM   Triglyceride 209 (H) 02/07/2017 09:07 AM   CHOL/HDL Ratio 3.2 06/17/2010 09:33 AM     Lab Results  Component Value Date/Time   ALT (SGPT) 30 12/20/2017 05:04 PM   AST (SGOT) 26 12/20/2017 05:04 PM   Alk. phosphatase 224 (H) 12/20/2017 05:04 PM   Bilirubin, direct 0.13 11/23/2012 09:09 AM   Bilirubin, total 0.3 12/20/2017 05:04 PM   Albumin 4.0 12/20/2017 05:04 PM   Protein, total 7.5 12/20/2017 05:04 PM   PLATELET 330 64/76/1417 05:04 PM              Assessment and Plan   Diagnoses and all orders for this visit:    1. Weakness due to cerebrovascular accident ([facility] [address]    2.  Confusion      82-year-old woman with multiple stroke risk factors who had an acute change in her mental status and physical strength 6 days ago and has not returned to her baseline. The son does think her mentation may be a little improved but still not like it was. He is adamant there was a distinct change 6 days ago. On examination she is diffusely weak. She does have subtle left-sided weakness more than right consistent with her history of stroke. Given her risk factors and presentation I do think she needs to have further evaluation in the hospital to include neuroimaging, carotid ultrasound, TTE.  OT and PT evaluations for possible rehabilitation. Continue aspirin and Plavix as is. Conduct stroke workup. Please consult neurology to see her tomorrow. I would like to see her once she is released in the hospital and rehabilitation. A notice of this visit/encounter being completed has been sent electronically to the patient's PCP and/or referring provider.      2 [facility], [facility] [address]  Diplomate ADRIANA

## 2018-02-09 NOTE — ED NOTES
Pt taken to restroom to obtain urine sample assist x 1. Pt missed hat, unable to obtain sample at this time.

## 2018-02-09 NOTE — ED PROVIDER NOTES
HPI Comments: [de-identified] y.o. female with past medical history significant for CVA, microalbuminuria, osteoporosis, anemia, RA, DM, HTN, hypothyroidism, hyperlipidemia, colon cancer with surgery and chemotherapy, seasonal allergies, and GERD who presents to the ED with chief complaint of fatigue. Patient's son reports the patient has confusion, lethargy, generalized weakness, unsteady gait, intermittent cough, urgency, and lower back pain with onset ~1 week ago. He reports the patient was seen by her PCP for her symptoms ~4 days ago; reports the patient was diagnosed with a UTI and started on \"I think Macrobid. \" He reports the patient has right shoulder pain with onset \"a while ago,\" reports the patient's pain became worse \"recently. \" He reports the patient has numbness in her fingertips bilaterally due to her DM at baseline. He reports the patient walks with a rollator at baseline; reports the patient has not been able to walk even with assistance since the onset of her symptoms. He reports the patient wears hearing aids at baseline. He reports the patient has a history of HTN, DM, and RA. He reports the patient has a history of a stroke in ~2002 with residual left-sided weakness. There are no other acute medical concerns at this time. PCP: Lou Yanes MD    Note written by Lauren Hung, as dictated by Ena Abarca. Deven David MD 6:22 PM     The history is provided by the patient and a relative.         Past Medical History:   Diagnosis Date    Anemia 9/2/2009    [facility] 9/2/2009    surgery/chemo    [facility] 9/2/2009    DM (diabetes mellitus) ([facility] 9/2/2009    GERD (gastroesophageal reflux disease)     H/O: CVA 9/2/2009    slight l sided weakness    Hypothyroid 9/2/2009    Ill-defined condition     seasonal allergies    Microalbuminuria 9/2/2009    Osteoporosis 9/2/2009    Other and unspecified hyperlipidemia 1/27/2010    Rheumatoid arthritis involving ankle ([facility] 9/28/2016  Rheumatoid arthritis(714.0) 2009    Unspecified essential hypertension 2009    Weakness due to cerebrovascular accident [facility])        Past Surgical History:   Procedure Laterality Date    HX COLECTOMY      HX HYSTERECTOMY      HX OTHER SURGICAL      colonoscopies numerous since          Family History:   Problem Relation Age of Onset    Diabetes Mother     Stroke Mother     Diabetes Sister     Other Sister      fell and hit her head -  of this   Hannah Phillip Diabetes Brother     Cancer Father      stomach    Diabetes Sister        Social History     Social History    Marital status:      Spouse name: N/A    Number of children: N/A    Years of education: N/A     Occupational History    Not on file. Social History Main Topics    Smoking status: Never Smoker    Smokeless tobacco: Never Used    Alcohol use No    Drug use: No    Sexual activity: Not Currently     Partners: Male     Other Topics Concern    Not on file     Social History Narrative         ALLERGIES: Statins-hmg-coa reductase inhibitors and Sulfa (sulfonamide antibiotics)    Review of Systems   Constitutional: Positive for fatigue. Negative for chills and fever. HENT: Negative for ear pain and sore throat. Eyes: Negative for pain. Respiratory: Positive for cough. Negative for chest tightness and shortness of breath. Cardiovascular: Negative for chest pain and leg swelling. Gastrointestinal: Negative for abdominal pain, nausea and vomiting. Genitourinary: Positive for urgency. Negative for dysuria and flank pain. Musculoskeletal: Positive for back pain. Skin: Negative for rash. Neurological: Positive for weakness. Negative for headaches. Psychiatric/Behavioral: Positive for confusion. All other systems reviewed and are negative.       Vitals:    18 1447   BP: 117/69   Pulse: 90   Resp: 18   Temp: 98.5 °F (36.9 °C)   SpO2: 100%   Weight: 59.9 kg (132 lb)   Height: 5' 1\" (1.549 m) Physical Exam   Constitutional: She appears well-developed. Elderly, confused. HENT:   Head: Normocephalic and atraumatic. Mouth/Throat: Oropharynx is clear and moist.   Eyes: Conjunctivae and EOM are normal. Pupils are equal, round, and reactive to light. No scleral icterus. Neck: Neck supple. No tracheal deviation present. Cardiovascular: Normal rate, regular rhythm, normal heart sounds and intact distal pulses. Pulmonary/Chest: Effort normal and breath sounds normal. No respiratory distress. Abdominal: Soft. She exhibits no distension. There is no tenderness. There is no rebound and no guarding. Genitourinary:   Genitourinary Comments: deferred   Musculoskeletal: She exhibits no edema. Weak, unable to stand. Neurological: She is alert. Skin: Skin is warm and dry. Psychiatric: She has a normal mood and affect. Nursing note and vitals reviewed. Note written by Lauren Dale, as dictated by Marsha Panchal. Lucius Azul MD 6:32 PM      MDM  Number of Diagnoses or Management Options  Acute frontal sinusitis, recurrence not specified:   Weakness:   Diagnosis management comments: Diff dx: UTI, ICH, electrolyte abnormality        ED Course       Procedures          LABORATORY TESTS:  Labs Reviewed   CBC WITH AUTOMATED DIFF - Abnormal; Notable for the following:        Result Value    WBC 12.4 (*)     RBC 3.27 (*)     HGB 9.4 (*)     HCT 28.6 (*)     RDW 19.6 (*)     MONOCYTES 14 (*)     ABS. NEUTROPHILS 8.4 (*)     ABS. MONOCYTES 1.7 (*)     ABS. IMM. GRANS. 0.1 (*)     All other components within normal limits   METABOLIC PANEL, COMPREHENSIVE - Abnormal; Notable for the following:     Sodium 135 (*)     BUN 31 (*)     Creatinine 1.08 (*)     BUN/Creatinine ratio 29 (*)     GFR est AA 59 (*)     GFR est non-AA 49 (*)     AST (SGOT) 47 (*)     Alk.  phosphatase 283 (*)     Albumin 2.3 (*)     Globulin 5.9 (*)     A-G Ratio 0.4 (*)     All other components within normal limits TROPONIN I - Abnormal; Notable for the following:     Troponin-I, Qt. 0.07 (*)     All other components within normal limits   TROPONIN I - Abnormal; Notable for the following:     Troponin-I, Qt. 0.07 (*)     All other components within normal limits   URINE CULTURE HOLD SAMPLE   CULTURE, URINE   CK W/ REFLX CKMB   SAMPLES BEING HELD   URINALYSIS W/MICROSCOPIC   MAGNESIUM   OCCULT BLOOD, STOOL   OCCULT BLOOD, STOOL       IMAGING RESULTS:  No results found. MEDICATIONS GIVEN:  Medications   sodium chloride 0.9 % bolus infusion 1,000 mL (0 mL IntraVENous IV Completed 2/9/18 2149)   cefTRIAXone (ROCEPHIN) 1 g in 0.9% sodium chloride (MBP/ADV) 50 mL (0 g IntraVENous IV Completed 2/9/18 2324)       IMPRESSION:  1. Acute frontal sinusitis, recurrence not specified    2.  Weakness        PLAN:  -    -   Follow-up Information     Follow up With Details Comments Contact Info    Elizabeth Owen MD Go in 1 week  [address] [place] [address]      [facility] [place] [facility]  If symptoms worsen [address] [place] [address]  [phone]          Disposition:  home, see patient instructions for treatment and plan    Condition:  stable    Total critical care time spent exclusive of procedures:  0 minutes    Ruy Moncada MD

## 2018-02-09 NOTE — PATIENT INSTRUCTIONS
Back Pain: Care Instructions  Your Care Instructions    Back pain has many possible causes. It is often related to problems with muscles and ligaments of the back. It may also be related to problems with the nerves, discs, or bones of the back. Moving, lifting, standing, sitting, or sleeping in an awkward way can strain the back. Sometimes you don't notice the injury until later. Arthritis is another common cause of back pain. Although it may hurt a lot, back pain usually improves on its own within several weeks. Most people recover in 12 weeks or less. Using good home treatment and being careful not to stress your back can help you feel better sooner. Follow-up care is a key part of your treatment and safety. Be sure to make and go to all appointments, and call your doctor if you are having problems. It's also a good idea to know your test results and keep a list of the medicines you take. How can you care for yourself at home? · Sit or lie in positions that are most comfortable and reduce your pain. Try one of these positions when you lie down:  ¨ Lie on your back with your knees bent and supported by large pillows. ¨ Lie on the floor with your legs on the seat of a sofa or chair. [place] on your side with your knees and hips bent and a pillow between your legs. ¨ Lie on your stomach if it does not make pain worse. · Do not sit up in bed, and avoid soft couches and twisted positions. Bed rest can help relieve pain at first, but it delays healing. Avoid bed rest after the first day of back pain. · Change positions every 30 minutes. If you must sit for long periods of time, take breaks from sitting. Get up and walk around, or lie in a comfortable position. · Try using a heating pad on a low or medium setting for 15 to 20 minutes every 2 or 3 hours. Try a warm shower in place of one session with the heating pad. · You can also try an ice pack for 10 to 15 minutes every 2 to 3 hours.  Put a thin cloth between the ice pack and your skin. · Take pain medicines exactly as directed. ¨ If the doctor gave you a prescription medicine for pain, take it as prescribed. ¨ If you are not taking a prescription pain medicine, ask your doctor if you can take an over-the-counter medicine. · Take short walks several times a day. You can start with 5 to 10 minutes, 3 or 4 times a day, and work up to longer walks. Walk on level surfaces and avoid hills and stairs until your back is better. · Return to work and other activities as soon as you can. Continued rest without activity is usually not good for your back. · To prevent future back pain, do exercises to stretch and strengthen your back and stomach. Learn how to use good posture, safe lifting techniques, and proper body mechanics. When should you call for help? Call your doctor now or seek immediate medical care if:  ? · You have new or worsening numbness in your legs. ? · You have new or worsening weakness in your legs. (This could make it hard to stand up.)   ? · You lose control of your bladder or bowels. ? Watch closely for changes in your health, and be sure to contact your doctor if:  ? · Your pain gets worse. ? · You are not getting better after 2 weeks. Where can you learn more? Go to http://david-noni.info/. Enter D239 in the search box to learn more about \"Back Pain: Care Instructions. \"  Current as of: March 21, 2017  Content Version: 11.4  © 2849-2919 [employer]. Care instructions adapted under license by [employer] (which disclaims liability or warranty for this information). If you have questions about a medical condition or this instruction, always ask your healthcare professional. [place] [address] any warranty or liability for your use of this information. Learning About an Ischemic Stroke  What is an ischemic stroke?     An ischemic (say \"qqn-FVH-[id number]\") stroke occurs when a blood clot blocks a blood vessel in the brain. This means that blood cannot flow to some part of the brain. Without blood and the oxygen it carries, this part of the brain starts to die. The part of the body controlled by the damaged area of the brain cannot work properly. This is different from a hemorrhagic (say \"bir-wsf-CC-alvarez\") stroke, which happens when a blood vessel in the brain has burst open or has started to leak. The brain damage from a stroke starts within minutes. Quick treatment can help limit damage to the brain and make recovery more likely. People who have had a stroke may have a hard time talking, understanding things, and making decisions. They may have to relearn daily activities, such as how to eat, bathe, and dress. How well someone recovers from a stroke depends on how quickly the person gets to the hospital, where in the brain the stroke happened, and how severe it was. Training and therapy also make a difference in how well people recover. What are the symptoms? If you have any of these symptoms, call 911 or other emergency services right away:  · You have symptoms of a stroke. These may include:  ¨ Sudden numbness, tingling, weakness, or loss of movement in your face, arm, or leg, especially on only one side of your body. ¨ Sudden vision changes. ¨ Sudden trouble speaking. ¨ Sudden confusion or trouble understanding simple statements. ¨ Sudden problems with walking or balance. ¨ A sudden, severe headache that is different from past headaches. See your doctor if you have symptoms that seem like a stroke, even if they go away quickly. You may have had a transient ischemic attack (TIA), sometimes called a mini-stroke. A TIA is a warning that a stroke may happen soon. Getting early treatment for a TIA can help prevent a stroke. What causes an ischemic stroke? An ischemic stroke is caused by a blood clot that blocks blood flow in the brain.  The most common causes of blood clots include:  · Hardening of the arteries (atherosclerosis). This is caused by high blood pressure, diabetes, high cholesterol, or smoking. · Atrial fibrillation. How is ischemic stroke treated? You may have to take several medicines, depending on what caused your stroke. Ask your doctor if a stroke rehab program is right for you. Rehab increases your chances of getting back some of the abilities you lost.  How can you prevent another stroke? · Work with your doctor to treat any health problems you have. High blood pressure, high cholesterol, atrial fibrillation, and diabetes all raise your chances of having a stroke. · Be safe with medicines. Take your medicine exactly as prescribed. Call your doctor if you think you are having a problem with your medicine. · Have a healthy lifestyle. ¨ Do not smoke or allow others to smoke around you. If you need help quitting, talk to your doctor about stop-smoking programs and medicines. These can increase your chances of quitting for good. Smoking makes a stroke more likely. ¨ Limit alcohol to 2 drinks a day for men and 1 drink a day for women. ¨ Lose weight if you need to. A healthy weight will help you keep your heart and body healthy. ¨ Be active. Ask your doctor what type and level of activity is safe for you. ¨ Eat heart-healthy foods, like fruits, vegetables, and high-fiber foods. Follow-up care is a key part of your treatment and safety. Be sure to make and go to all appointments, and call your doctor if you are having problems. It's also a good idea to know your test results and keep a list of the medicines you take. Where can you learn more? Go to http://david-noni.info/. Enter D161 in the search box to learn more about \"Learning About an Ischemic Stroke. \"  Current as of: March 20, 2017  Content Version: 11.4  © 0195-7649 [employer].  Care instructions adapted under license by [employer] (which disclaims liability or warranty for this information). If you have questions about a medical condition or this instruction, always ask your healthcare professional. [place] [address] any warranty or liability for your use of this information. Learning About FAST: Stroke Warning Signs  What is FAST? FAST is a simple way to remember the main symptoms of stroke. Recognizing these symptoms helps you know when to call for medical help. FAST stands for:  · Face drooping. · Arm weakness. · Speech difficulty. · Time to call 911. What happens when you have a stroke? A stroke occurs when a blood vessel to the brain bursts or is blocked by a blood clot. Within minutes, the nerve cells in that part of the brain die. As a result, the part of the body controlled by those cells cannot work properly. The effects of a stroke may range from mild to severe. They may get better, or they may last the rest of your life. A stroke can affect vision, speech, behavior, thought processes, and your ability to move. It can cause symptoms that may include:  · Sudden numbness, tingling, weakness, or loss of movement in your face, arm, or leg, especially on only one side of your body. · Sudden vision changes. · Sudden trouble speaking. · Sudden confusion or trouble understanding simple statements. · Sudden problems with walking or balance. · A sudden, severe headache that is different from past headaches. Why is it important to get help FAST? Quick treatment may save your life. And it may reduce the damage in your brain so that you have fewer problems after the stroke. When you know the FAST symptoms, you will know when it's important to call for medical help. Where can you learn more? Go to http://david-noni.info/. Enter Y192 in the search box to learn more about \"Learning About FAST: Stroke Warning Signs. \"  Current as of: March 20, 2017  Content Version: 11.4  © 5702-1963 [employer]. Care instructions adapted under license by [employer] (which disclaims liability or warranty for this information). If you have questions about a medical condition or this instruction, always ask your healthcare professional. [address] any warranty or liability for your use of this information.

## 2018-02-09 NOTE — ED TRIAGE NOTES
Referred by Dr. Rakesh Blankenhsip, neurologist, d/t weakness, lethargy, loss of appetite, and confusion onset 7 days ago. Currently, taking antibiotic for UTI.

## 2018-02-09 NOTE — MR AVS SNAPSHOT
[facility] [address] [place] [address] 
[phone] Patient: Jose Tijerina MRN:  [id number] Visit Information Date & Time Provider Department Dept. Phone Encounter #  
 2/9/2018  1:00 PM Lidia Reeves, [facility] at [address] [place] [address] Road [id number] Follow-up Instructions Return in about 2 months (around 4/9/2018). Your Appointments 2/13/2018  1:40 PM  
ESTABLISHED PATIENT with MD Shanell Jones ([address]) Appt Note: lower Back pain/Right shoulder  
 [address] [place] [address]  
[phone]  
  
   
 [address] [place] [address]  
  
    
 4/3/2018  4:00 PM  
ESTABLISHED PATIENT with MD Shanell Jones ([address]) Appt Note: 3 mo fu  
 [address] [facility] [address] [place] [address]  
[phone] Upcoming Health Maintenance Date Due  
 FOOT EXAM Q1 2/21/1945 EYE EXAM RETINAL OR DILATED Q1 2/21/1945 DTaP/Tdap/Td series (1 - Tdap) 2/21/1958 ZOSTER VACCINE AGE 60> 12/21/1996 GLAUCOMA SCREENING Q2Y 2/21/2002 MEDICARE YEARLY EXAM 2/21/2002 Pneumococcal 65+ High/Highest Risk (2 of 2 - PPSV23) 6/29/2011 BREAST CANCER SCRN MAMMOGRAM 5/19/2012 COLONOSCOPY 4/20/2016 HEMOGLOBIN A1C Q6M 9/17/2016 MICROALBUMIN Q1 3/17/2017 Influenza Age 5 to Adult 8/1/2017 LIPID PANEL Q1 2/7/2018 Allergies as of 2/9/2018  Review Complete On: 2/9/2018 By: Marlene Avitia Severity Noted Reaction Type Reactions Statins-hmg-coa Reductase Inhibitors  12/21/2016    Other (comments) Intolerant to statins Sulfa (Sulfonamide Antibiotics)  09/02/2009    Other (comments)  
 syncope Current Immunizations  Reviewed on 11/21/2016 Name Date Influenza High Dose Vaccine PF 11/1/2016 Influenza Vaccine Split 10/1/2011, 10/15/2010 Influenza Vaccine Whole 10/1/2004 ZZZ-RETIRED (DO NOT USE) Pneumococcal Vaccine (Unspecified Type) 6/29/2006 Not reviewed this visit You Were Diagnosed With   
  
 Codes Comments Weakness due to cerebrovascular accident [facility])    -  Primary ICD-10-CM: I63.9, R53.1 ICD-9-CM: 434.91, 780.79 Confusion     ICD-10-CM: R41.0 ICD-9-CM: 298.9 Vitals OB Status Smoking Status Hysterectomy Never Smoker Preferred Pharmacy Pharmacy Name Phone Kindra [facility] [phone] Your Updated Medication List  
  
   
This list is accurate as of: 2/9/18  2:09 PM.  Always use your most recent med list.  
  
  
  
  
 alirocumab 75 mg/mL injector pen Commonly known as:  PRALUENT PEN  
1 mL by SubCUTAneous route Once every 2 weeks. amLODIPine 5 mg tablet Commonly known as:  Cleo Glatter Take 5 mg by mouth daily. aspirin delayed-release 81 mg tablet Take 81 mg by mouth daily. BD INSULIN PEN NEEDLE UF MINI 31 gauge x 3/16\" Ndle Generic drug:  Insulin Needles (Disposable) USE AS DIRECTED WITH FORTEO PEN  
  
 carvedilol 25 mg tablet Commonly known as:  Early Sam Take 25 mg by mouth two (2) times a day. chlorthalidone 50 mg tablet Commonly known as:  Allean Patrick Take 50 mg by mouth daily. CINNAMON PO Take 250 mg by mouth daily. clopidogrel 75 mg Tab Commonly known as:  PLAVIX Take 1 Tab by mouth daily. ENBREL 50 mg/mL (0.98 mL) injection Generic drug:  etanercept  
by SubCUTAneous route. MAKAYLA-C 500 mg Tab Generic drug:  ascorbate calcium Take 1,000 mg by mouth daily. evolocumab pen injection Commonly known as:  Trinidad Mcdonnell 1 mL by SubCUTAneous route every fourteen (14) days. folic acid 1 mg tablet Commonly known as:  FOLVITE  
TAKE ONE TABLET BY MOUTH DAILY FORTEO 20 mcg/dose - 600 mcg/2.4 mL Pnij injection Generic drug:  teriparatide USE 1 INJECTION (20 MCG OR 0.08ML) UNDER THE SKIN ONCE DAILY. HAIR,SKIN & NAILS PO Take  by mouth. Takes one po daily. JANUVIA 25 mg tablet Generic drug:  SITagliptin Take 50 mg by mouth daily. LECITHIN PO Take 800 mg by mouth two (2) times a day. levothyroxine 112 mcg tablet Commonly known as:  SYNTHROID Take  by mouth Daily (before breakfast). lisinopril 40 mg tablet Commonly known as:  Dellis Rumble Take 40 mg by mouth daily. MAGNESIUM MALATE Take 200 mg by mouth daily. metFORMIN 1,000 mg tablet Commonly known as:  GLUCOPHAGE Take 1 Tab by mouth two (2) times daily (with meals). methotrexate (PF) 25 mg/0.4 mL Atin Commonly known as:  OTREXUP (PF)  
25 mg by SubCUTAneous route Every Thursday. MONOJECT TB SAFETY SYRINGE 1 mL 25 gauge x 5/8\" Syrg Generic drug:  Syringe with Needle (Disp) Use 1 syringe with methotrexate as directed. OCUVITE PO Take  by mouth. Takes one po daily. OMEGA 3 FISH OIL PO Take 300 mg by mouth daily. OTHER  
L-citrulline malate complex 750mg. Takes one po daily. potassium chloride SR 10 mEq tablet Commonly known as:  KLOR-CON 10 Take 20 mEq by mouth daily. VITAMIN D3 PO Take 1,000 Units by mouth daily. Follow-up Instructions Return in about 2 months (around 4/9/2018). Patient Instructions Back Pain: Care Instructions Your Care Instructions Back pain has many possible causes. It is often related to problems with muscles and ligaments of the back. It may also be related to problems with the nerves, discs, or bones of the back. Moving, lifting, standing, sitting, or sleeping in an awkward way can strain the back. Sometimes you don't notice the injury until later. Arthritis is another common cause of back pain. Although it may hurt a lot, back pain usually improves on its own within several weeks. Most people recover in 12 weeks or less. Using good home treatment and being careful not to stress your back can help you feel better sooner. Follow-up care is a key part of your treatment and safety. Be sure to make and go to all appointments, and call your doctor if you are having problems. It's also a good idea to know your test results and keep a list of the medicines you take. How can you care for yourself at home? · Sit or lie in positions that are most comfortable and reduce your pain. Try one of these positions when you lie down: ¨ Lie on your back with your knees bent and supported by large pillows. ¨ Lie on the floor with your legs on the seat of a sofa or chair. Donnel Seller on your side with your knees and hips bent and a pillow between your legs. ¨ Lie on your stomach if it does not make pain worse. · Do not sit up in bed, and avoid soft couches and twisted positions. Bed rest can help relieve pain at first, but it delays healing. Avoid bed rest after the first day of back pain. · Change positions every 30 minutes. If you must sit for long periods of time, take breaks from sitting. Get up and walk around, or lie in a comfortable position. · Try using a heating pad on a low or medium setting for 15 to 20 minutes every 2 or 3 hours. Try a warm shower in place of one session with the heating pad. · You can also try an ice pack for 10 to 15 minutes every 2 to 3 hours. Put a thin cloth between the ice pack and your skin. · Take pain medicines exactly as directed. ¨ If the doctor gave you a prescription medicine for pain, take it as prescribed. ¨ If you are not taking a prescription pain medicine, ask your doctor if you can take an over-the-counter medicine. · Take short walks several times a day. You can start with 5 to 10 minutes, 3 or 4 times a day, and work up to longer walks.  Walk on level surfaces and avoid hills and stairs until your back is better. · Return to work and other activities as soon as you can. Continued rest without activity is usually not good for your back. · To prevent future back pain, do exercises to stretch and strengthen your back and stomach. Learn how to use good posture, safe lifting techniques, and proper body mechanics. When should you call for help? Call your doctor now or seek immediate medical care if: 
? · You have new or worsening numbness in your legs. ? · You have new or worsening weakness in your legs. (This could make it hard to stand up.) ? · You lose control of your bladder or bowels. ? Watch closely for changes in your health, and be sure to contact your doctor if: 
? · Your pain gets worse. ? · You are not getting better after 2 weeks. Where can you learn more? Go to http://david-noni.info/. Enter D449 in the search box to learn more about \"Back Pain: Care Instructions. \" Current as of: March 21, 2017 Content Version: 11.4 © 2206-2456 ReadyDock. Care instructions adapted under license by InMyRoom (which disclaims liability or warranty for this information). If you have questions about a medical condition or this instruction, always ask your healthcare professional. [address] any warranty or liability for your use of this information. Learning About an Ischemic Stroke What is an ischemic stroke? An ischemic (say \"hwn-IMD-qbjv\") stroke occurs when a blood clot blocks a blood vessel in the brain. This means that blood cannot flow to some part of the brain. Without blood and the oxygen it carries, this part of the brain starts to die. The part of the body controlled by the damaged area of the brain cannot work properly.  
This is different from a hemorrhagic (say \"jzq-ldl-TX-jick\") stroke, which happens when a blood vessel in the brain has burst open or has started to leak. The brain damage from a stroke starts within minutes. Quick treatment can help limit damage to the brain and make recovery more likely. People who have had a stroke may have a hard time talking, understanding things, and making decisions. They may have to relearn daily activities, such as how to eat, bathe, and dress. How well someone recovers from a stroke depends on how quickly the person gets to the hospital, where in the brain the stroke happened, and how severe it was. Training and therapy also make a difference in how well people recover. What are the symptoms? If you have any of these symptoms, call 911 or other emergency services right away: 
· You have symptoms of a stroke. These may include: 
¨ Sudden numbness, tingling, weakness, or loss of movement in your face, arm, or leg, especially on only one side of your body. ¨ Sudden vision changes. ¨ Sudden trouble speaking. ¨ Sudden confusion or trouble understanding simple statements. ¨ Sudden problems with walking or balance. ¨ A sudden, severe headache that is different from past headaches. See your doctor if you have symptoms that seem like a stroke, even if they go away quickly. You may have had a transient ischemic attack (TIA), sometimes called a mini-stroke. A TIA is a warning that a stroke may happen soon. Getting early treatment for a TIA can help prevent a stroke. What causes an ischemic stroke? An ischemic stroke is caused by a blood clot that blocks blood flow in the brain. The most common causes of blood clots include: 
· Hardening of the arteries (atherosclerosis). This is caused by high blood pressure, diabetes, high cholesterol, or smoking. · Atrial fibrillation. How is ischemic stroke treated? You may have to take several medicines, depending on what caused your stroke. Ask your doctor if a stroke rehab program is right for you.  Rehab increases your chances of getting back some of the abilities you lost. 
How can you prevent another stroke? · Work with your doctor to treat any health problems you have. High blood pressure, high cholesterol, atrial fibrillation, and diabetes all raise your chances of having a stroke. · Be safe with medicines. Take your medicine exactly as prescribed. Call your doctor if you think you are having a problem with your medicine. · Have a healthy lifestyle. ¨ Do not smoke or allow others to smoke around you. If you need help quitting, talk to your doctor about stop-smoking programs and medicines. These can increase your chances of quitting for good. Smoking makes a stroke more likely. ¨ Limit alcohol to 2 drinks a day for men and 1 drink a day for women. ¨ Lose weight if you need to. A healthy weight will help you keep your heart and body healthy. ¨ Be active. Ask your doctor what type and level of activity is safe for you. ¨ Eat heart-healthy foods, like fruits, vegetables, and high-fiber foods. Follow-up care is a key part of your treatment and safety. Be sure to make and go to all appointments, and call your doctor if you are having problems. It's also a good idea to know your test results and keep a list of the medicines you take. Where can you learn more? Go to http://david-noni.info/. Enter D161 in the search box to learn more about \"Learning About an Ischemic Stroke. \" Current as of: March 20, 2017 Content Version: 11.4 © 8138-2811 Guomai. Care instructions adapted under license by Paxer (which disclaims liability or warranty for this information). If you have questions about a medical condition or this instruction, always ask your healthcare professional. [place] [address] any warranty or liability for your use of this information. Learning About FAST: Stroke Warning Signs What is FAST? FAST is a simple way to remember the main symptoms of stroke. Recognizing these symptoms helps you know when to call for medical help. FAST stands for: 
· Face drooping. · Arm weakness. · Speech difficulty. · Time to call 911. What happens when you have a stroke? A stroke occurs when a blood vessel to the brain bursts or is blocked by a blood clot. Within minutes, the nerve cells in that part of the brain die. As a result, the part of the body controlled by those cells cannot work properly. The effects of a stroke may range from mild to severe. They may get better, or they may last the rest of your life. A stroke can affect vision, speech, behavior, thought processes, and your ability to move. It can cause symptoms that may include: 
· Sudden numbness, tingling, weakness, or loss of movement in your face, arm, or leg, especially on only one side of your body. · Sudden vision changes. · Sudden trouble speaking. · Sudden confusion or trouble understanding simple statements. · Sudden problems with walking or balance. · A sudden, severe headache that is different from past headaches. Why is it important to get help FAST? Quick treatment may save your life. And it may reduce the damage in your brain so that you have fewer problems after the stroke. When you know the FAST symptoms, you will know when it's important to call for medical help. Where can you learn more? Go to http://david-noni.info/. Enter X767 in the search box to learn more about \"Learning About FAST: Stroke Warning Signs. \" Current as of: March 20, 2017 Content Version: 11.4 © 2974-6927 Zeer. Care instructions adapted under license by CoursePeer (which disclaims liability or warranty for this information).  If you have questions about a medical condition or this instruction, always ask your healthcare professional. [employer] disclaims any warranty or liability for your use of this information. Introducing [facility]! [facility] introduces Picklive patient portal. Now you can access parts of your medical record, email your doctor's office, and request medication refills online. 1. In your internet browser, go to [email or web address] 2. Click on the First Time User? Click Here link in the Sign In box. You will see the New Member Sign Up page. 3. Enter your Picklive Access Code exactly as it appears below. You will not need to use this code after youve completed the sign-up process. If you do not sign up before the expiration date, you must request a new code. · Picklive Access Code: [email or web address] Expires: 5/10/2018  1:02 PM 
 
4. Enter the last four digits of your Social Security Number (xxxx) and Date of Birth (mm/dd/yyyy) as indicated and click Submit. You will be taken to the next sign-up page. 5. Create a Picklive ID. This will be your Picklive login ID and cannot be changed, so think of one that is secure and easy to remember. 6. Create a Picklive password. You can change your password at any time. 7. Enter your Password Reset Question and Answer. This can be used at a later time if you forget your password. 8. Enter your e-mail address. You will receive e-mail notification when new information is available in [address] 9. Click Sign Up. You can now view and download portions of your medical record. 10. Click the Download Summary menu link to download a portable copy of your medical information. If you have questions, please visit the Frequently Asked Questions section of the Picklive website. Remember, Picklive is NOT to be used for urgent needs. For medical emergencies, dial 911. Now available from your iPhone and Android! Please provide this summary of care documentation to your next provider. Your primary care clinician is listed as Kerwin Loera. If you have any questions after today's visit, please call [phone].

## 2018-02-10 NOTE — ED NOTES
Patient back from xray. Straight cathed for urine, pt tolerated procedure well. Pt placed on monitor x 2, vital signs updated, IVF infusing, call bell within reach, son at bedside, no complaints at this time.

## 2018-02-10 NOTE — DISCHARGE INSTRUCTIONS
Sinusitis: Care Instructions  Your Care Instructions    Sinusitis is an infection of the lining of the sinus cavities in your head. Sinusitis often follows a cold. It causes pain and pressure in your head and face. In most cases, sinusitis gets better on its own in 1 to 2 weeks. But some mild symptoms may last for several weeks. Sometimes antibiotics are needed. Follow-up care is a key part of your treatment and safety. Be sure to make and go to all appointments, and call your doctor if you are having problems. It's also a good idea to know your test results and keep a list of the medicines you take. How can you care for yourself at home? · Take an over-the-counter pain medicine, such as acetaminophen (Tylenol), ibuprofen (Advil, Motrin), or naproxen (Aleve). Read and follow all instructions on the label. · If the doctor prescribed antibiotics, take them as directed. Do not stop taking them just because you feel better. You need to take the full course of antibiotics. · Be careful when taking over-the-counter cold or flu medicines and Tylenol at the same time. Many of these medicines have acetaminophen, which is Tylenol. Read the labels to make sure that you are not taking more than the recommended dose. Too much acetaminophen (Tylenol) can be harmful. · Breathe warm, moist air from a steamy shower, a hot bath, or a sink filled with hot water. Avoid cold, dry air. Using a humidifier in your home may help. Follow the directions for cleaning the machine. · Use saline (saltwater) nasal washes to help keep your nasal passages open and wash out mucus and bacteria. You can buy saline nose drops at a grocery store or drugstore. Or you can make your own at home by adding 1 teaspoon of salt and 1 teaspoon of baking soda to 2 cups of distilled water. If you make your own, fill a bulb syringe with the solution, insert the tip into your nostril, and squeeze gently. Clance Candie your nose.   · Put a hot, wet towel or a warm gel pack on your face 3 or 4 times a day for 5 to 10 minutes each time. · Try a decongestant nasal spray like oxymetazoline (Afrin). Do not use it for more than 3 days in a row. Using it for more than 3 days can make your congestion worse. When should you call for help? Call your doctor now or seek immediate medical care if:  ? · You have new or worse swelling or redness in your face or around your eyes. ? · You have a new or higher fever. ? Watch closely for changes in your health, and be sure to contact your doctor if:  ? · You have new or worse facial pain. ? · The mucus from your nose becomes thicker (like pus) or has new blood in it. ? · You are not getting better as expected. Where can you learn more? Go to http://david-noni.info/. Enter F428 in the search box to learn more about \"Sinusitis: Care Instructions. \"  Current as of: May 12, 2017  Content Version: 11.4  © 6792-1660 eBrevia. Care instructions adapted under license by CloudHealth Technologies (which disclaims liability or warranty for this information). If you have questions about a medical condition or this instruction, always ask your healthcare professional. [place] [address] any warranty or liability for your use of this information. Weakness: Care Instructions  Your Care Instructions    Weakness is a lack of physical or muscle strength. You may feel that you need to make extra effort to move your arms, legs, or other muscles. Generalized weakness means that you feel weak in most areas of your body. Another type of weakness may affect just one muscle or group of muscles. You may feel weak and tired after you have done too much activity, such as taking an extra-long hike. This is not a serious problem. It often goes away on its own. Feeling weak can also be caused by medical conditions like thyroid problems, depression, or a virus. Sometimes the cause can be serious.  Your doctor may want to do more tests to try to find the cause of the weakness. The doctor has checked you carefully, but problems can develop later. If you notice any problems or new symptoms, get medical treatment right away. Follow-up care is a key part of your treatment and safety. Be sure to make and go to all appointments, and call your doctor if you are having problems. It's also a good idea to know your test results and keep a list of the medicines you take. How can you care for yourself at home? · Rest when you feel tired. · Be safe with medicines. If your doctor prescribed medicine, take it exactly as prescribed. Call your doctor if you think you are having a problem with your medicine. You will get more details on the specific medicines your doctor prescribes. · Do not skip meals. Eating a balanced diet may increase your energy level. · Get some physical activity every day, but do not get too tired. When should you call for help? Call your doctor now or seek immediate medical care if:  ? · You have new or worse weakness. ? · You are dizzy or lightheaded, or you feel like you may faint. ? Watch closely for changes in your health, and be sure to contact your doctor if:  ? · You do not get better as expected. Where can you learn more? Go to http://david-noni.info/. Enter 235 [id number] [id number] in the search box to learn more about \"Weakness: Care Instructions. \"  Current as of: March 20, 2017  Content Version: 11.4  © 4484-2539 MyFuelUp. Care instructions adapted under license by Mixx (which disclaims liability or warranty for this information). If you have questions about a medical condition or this instruction, always ask your healthcare professional. [address] any warranty or liability for your use of this information.

## 2018-02-10 NOTE — ED NOTES
Discharge instructions and written script given to pt and family by provider. Both pt and family verbalized understanding. VSS. NAD noted. Pt escorted out of ED with family via personal wheelchair.

## 2018-02-11 LAB
BACTERIA SPEC CULT: NORMAL
CC UR VC: NORMAL
SERVICE CMNT-IMP: NORMAL

## 2018-02-12 NOTE — CALL BACK NOTE
[facility] Emergency Department Follow Up Call Record    Discharged to : Home/Family Home/Home Health/Skilled Facility/Rehab/Assisted Living/Other__Home_____  1) Did you receive your discharge instructions? Yes This writer confirmed with son, that he had reviewed      the Discharge Instructions.  verified with son concerning this patient. 2) Do you understand them? Yes         3) Are you able to follow them? Yes          If NO, what can I clarify for you? 4) Do you understand your diagnosis? Yes         5) Do you know which symptoms should prompt you to call the doctor? Yes     6) Were you able to fill and  any medications that were prescribed? Yes     7) You were prescribed __Doxycycline_________for __sinusitis/UTI__________________. Common side effects of this medication are__rash__________________. This is not a complete list so please review the forms given from the pharmacy for a complete list.      8) Are there any questions about your medications? No            Have you scheduled any recommended doctors appointments (specialty, PCP) YES  If NO, what barriers are you encountering (transportation/lost contact info/cost/  didnt think necessary/no PCP  9) If discharged with Home Health, has the agency contacted you to schedule visit? N/A  Son states Faustina Lewis has personal care providers already coming in to assist\"  8) Is there anyone available to help you at home (meals, errands, transportation    monitoring) (adult children, neighbors, private duty companions) Yes    11) Are you on a special diet? Yes         If YES, do you understand the requirements for this diet? Education provided? 12) If presented with cough, bronchitis, COPD, asthma, is it ok to ask that the   respiratory disease management educator call you? Not applicable      13)  A) If presented with fall, were you issued an assistive device in the ED    Are you using? Yes  B) If given RX for device, have you obtained? Yes Patient uses rollator      If NO, barriers? C) Therapist recommended: N/A     Are you able to implement the suggestions? Not applicable        If NO, barriers to implementation? D) Are you having any difficulties with mobility inside your home?     (steps, bed, tub)No. Son reports his mother is ambulating much better, is stronger. If YES, ask if the [facility] PT can contact patient and good time and number?  14)  At the end of your discharge instructions, there is information about accessing \[facility] [place] [facility]\"" & [facility], have you had a chance to review those? Not applicable         Do you have any questions about signing up for this service? NO   We encourage our patients to be active participants in their healthcare and this site is one of the ways to do that. It will allow you to access parts of your medical record, email your doctors office, schedule appointments, and request medications refills . 15) Are there any other questions that I can answer for you regarding    your Emergency department visit? YES. Son requested listing of [facility] Providers . Sent list to [facility] address.              Estimated Call Time:___1:31 PM  ________________ Date/Time:_______________

## 2018-02-15 ENCOUNTER — TELEPHONE (OUTPATIENT)
Dept: RHEUMATOLOGY | Age: 83
End: 2018-02-15

## 2018-02-15 NOTE — TELEPHONE ENCOUNTER
Spoke with [employer] and gave them a verbal order for refills on Otrexup 25mg/0.4ml. They stated an understanding and will process it now.

## 2018-03-05 DIAGNOSIS — I73.9 PAD (PERIPHERAL ARTERY DISEASE) (HCC): ICD-10-CM

## 2018-03-05 RX ORDER — CLOPIDOGREL BISULFATE 75 MG/1
75 TABLET ORAL DAILY
Qty: 90 TAB | Refills: 0 | Status: SHIPPED | OUTPATIENT
Start: 2018-03-05 | End: 2018-05-11

## 2018-03-05 RX ORDER — CLOPIDOGREL BISULFATE 75 MG/1
75 TABLET ORAL DAILY
Qty: 30 TAB | Refills: 0 | OUTPATIENT
Start: 2018-03-05

## 2018-03-05 NOTE — TELEPHONE ENCOUNTER
Requested Prescriptions     Signed Prescriptions Disp Refills    clopidogrel (PLAVIX) 75 mg tab 90 Tab 0     Sig: Take 1 Tab by mouth daily. Authorizing Provider: Bishnu Herrera     Ordering User: Ana Luisa Kohler     Verbal order per . Follow up appointment with Dr. Hilda Gonsalez scheduled on 3-26-18.

## 2018-03-09 ENCOUNTER — TELEPHONE (OUTPATIENT)
Dept: CARDIOLOGY CLINIC | Age: 83
End: 2018-03-09

## 2018-03-09 NOTE — TELEPHONE ENCOUNTER
I have spoken to patient several times and her son. The last time I spoke to him he said he would take her to the lab but that was some time ago.

## 2018-03-09 NOTE — TELEPHONE ENCOUNTER
Renetta from [facility] calling to see if the pt has any recent lipid labs. She knew there was one ordered and I told her there were no final results. She can be reached @ [phone]. Thanks!   Melvi Back

## 2018-03-12 RX ORDER — TERIPARATIDE 250 UG/ML
INJECTION, SOLUTION SUBCUTANEOUS
Qty: 2.4 ML | Refills: 0 | Status: ON HOLD | OUTPATIENT
Start: 2018-03-12 | End: 2018-05-02 | Stop reason: SDUPTHER

## 2018-03-13 ENCOUNTER — TELEPHONE (OUTPATIENT)
Dept: RHEUMATOLOGY | Age: 83
End: 2018-03-13

## 2018-03-13 NOTE — TELEPHONE ENCOUNTER
Faxed prior auth request to [facility] for insurance approval for Nael Goodson.

## 2018-03-15 ENCOUNTER — TELEPHONE (OUTPATIENT)
Dept: RHEUMATOLOGY | Age: 83
End: 2018-03-15

## 2018-03-15 NOTE — TELEPHONE ENCOUNTER
[employer] called to verify we had received the prior authorization request for [place] for the patient. If you have any questions about the prior authorization you can call [phone] and speak to anyone. Also the reference number would be [id number].

## 2018-03-19 ENCOUNTER — HOSPITAL ENCOUNTER (OUTPATIENT)
Dept: MRI IMAGING | Age: 83
Discharge: HOME | End: 2018-03-19
Attending: SPECIALIST
Payer: MEDICAID

## 2018-03-19 VITALS — WEIGHT: 129 LBS | BODY MASS INDEX: 24.37 KG/M2

## 2018-03-19 DIAGNOSIS — H91.21 SUDDEN IDIOPATHIC HEARING LOSS OF RIGHT EAR: ICD-10-CM

## 2018-03-19 DIAGNOSIS — H90.3 SENSORINEURAL HEARING LOSS, BILATERAL: ICD-10-CM

## 2018-03-19 DIAGNOSIS — H81.12 BENIGN PAROXYSMAL VERTIGO OF LEFT EAR: ICD-10-CM

## 2018-03-19 PROCEDURE — A9576 INJ PROHANCE MULTIPACK: HCPCS | Performed by: SPECIALIST

## 2018-03-19 PROCEDURE — 70553 MRI BRAIN STEM W/O & W/DYE: CPT

## 2018-03-19 PROCEDURE — 74011250636 HC RX REV CODE- 250/636: Performed by: SPECIALIST

## 2018-03-19 RX ADMIN — GADOTERIDOL 11 ML: 279.3 INJECTION, SOLUTION INTRAVENOUS at 11:10

## 2018-04-03 ENCOUNTER — OFFICE VISIT (OUTPATIENT)
Dept: RHEUMATOLOGY | Age: 83
End: 2018-04-03

## 2018-04-03 ENCOUNTER — TELEPHONE (OUTPATIENT)
Dept: RHEUMATOLOGY | Age: 83
End: 2018-04-03

## 2018-04-03 VITALS
RESPIRATION RATE: 18 BRPM | WEIGHT: 124 LBS | SYSTOLIC BLOOD PRESSURE: 131 MMHG | HEIGHT: 61 IN | DIASTOLIC BLOOD PRESSURE: 64 MMHG | HEART RATE: 87 BPM | TEMPERATURE: 98.4 F | BODY MASS INDEX: 23.41 KG/M2

## 2018-04-03 DIAGNOSIS — M81.0 AGE-RELATED OSTEOPOROSIS WITHOUT CURRENT PATHOLOGICAL FRACTURE: ICD-10-CM

## 2018-04-03 DIAGNOSIS — N18.30 CKD (CHRONIC KIDNEY DISEASE) STAGE 3, GFR 30-59 ML/MIN (HCC): ICD-10-CM

## 2018-04-03 DIAGNOSIS — M05.79 SEROPOSITIVE RHEUMATOID ARTHRITIS OF MULTIPLE SITES (HCC): Primary | ICD-10-CM

## 2018-04-03 DIAGNOSIS — R93.0 ABNORMAL MRI OF HEAD: ICD-10-CM

## 2018-04-03 DIAGNOSIS — Z79.60 LONG-TERM USE OF IMMUNOSUPPRESSANT MEDICATION: ICD-10-CM

## 2018-04-03 NOTE — PROGRESS NOTES
REASON FOR VISIT    This is an acute for Ms. Ledesma for Seropositive Erosive Nodular Rheumatoid Arthritis and Osteoporosis. Inflammatory arthritis phenotype includes:  Anti-CCP positive: yes (71)  Rheumatoid factor positive: yes (81.8)  Erosive disease: yes  Extra-articular manifestations include: rheumatoid nodules    Immunosuppression Screening (12/20/2017): Quantiferon TB: negative  PPD:  Not performed  Hepatitis B: negative   Hepatitis C: negative    Therapy History includes:     Current DMARD therapy includes: methotrexate 25 mg subcutaneous every Wednesday, Enbrel 50 mg weekly (10/2017)  Prior DMARD therapy includes: methotrexate 25 mg subcutaneous, Orencia subcutaneous (3/2017-9/2017)  The following DMARDs have been ineffective: Orencia  The following DMARDs were stopped because of side effects: none    Osteoporosis Historical Synopsis     Height loss since age 29 (at least two inches): 1.5   Fracture history includes: yes (right radius and ulna, hairline in legs, L3, T10, T12 vertebral fractures)  Family history of hip fracture: no     Daily calcium intake is 1200 mg  Daily vitamin D intake is 1000 IU     Smoking history: no  Alcohol consumption: no  Prednisone history: no     Exercise: yes (walks around the house, core exercises)     Previous work-up for osteoporosis includes the following:  DEXA Scan: 11/23/2016  Vitamin 25OH D level: 67.0 (previously 82.3)  PTH: 19     Therapy History includes:     Current osteoporosis therapy includes:  Forteo (1/19/2017)  Prior osteoporosis therapy includes: Fosmax, [facility] [place]   The following osteoporosis have been ineffective: none  The following osteoporosis were stopped because of side effects: none     Immunizations:   Immunization History   Administered Date(s) Administered    Influenza High Dose Vaccine PF 11/01/2016    Influenza Vaccine Split 10/15/2010, 10/01/2011    Influenza Vaccine Whole 10/01/2004    ZZZ-RETIRED (DO NOT USE) Pneumococcal Vaccine (Unspecified Type) 2006     Active problems include:    Patient Active Problem List   Diagnosis Code    DM (diabetes mellitus) ([facility] [place] [facility] E11.9    Hypothyroid E03.9    Colon cancer ([facility] [place] [facility] C18.9    H/O: CVA     Microalbuminuria R80.9    Age-related osteoporosis without current pathological fracture M81.0    Essential hypertension I10    Anemia D64.9    Hyperlipidemia E78.5    Carotid bruit R09.89    Claudication ([facility]) I73.9    Weakness of left arm R29.898    PAD (peripheral artery disease) ([facility]) I73.9    Weakness due to cerebrovascular accident [id number]    Neuropathy in diabetes ([facility] [place] [facility] E11.40    Occlusion and stenosis of carotid artery without mention of cerebral infarction I65.29    Seropositive rheumatoid arthritis of multiple sites ([facility] [place] [facility] M05.79    Primary osteoarthritis of both knees M17.0    Long-term use of immunosuppressant medication Z79.899    Statin intolerance Z78.9    Asymptomatic hyperuricemia E79.0    Vitamin D deficiency E55.9    CKD (chronic kidney disease) stage 3, GFR 30-59 ml/min N18.3       HISTORY OF PRESENT ILLNESS    Ms. Jules Leonardo returns for an acute visit. On her last visit, she presented with Shingles. I asked her to hold methotrexate 25 mg subcutaneous every Saturday (Otrexup) and Enbrel subcutaneous. Today, she complains of tingling and tightness in her hands that is constant for the past 3 months. I continued Forteo. She denies interval falls of fractures. Today, she reports improvement in her right hand after resuming Otrexup and Enbrel. She has residual weakness in her left hand due to a stroke. Her son reports that she is better. Her   the day before Thanksgiving from pancreatic cancer. Ms. Jules Leonardo has continued her medications for arthritis (methotrexate, Enbrel) and reports good tolerance without significant side effects.      Last toxicity monitoring by blood work was done on 2018 and did not reveal any significant adverse effects, Hct 31.3%, creatinine 1.08 mg/dL, eGFR 49, ALT 47U/L (previously 36, 41 U/L),  (previously 137, 155). Vitamin D was 67.0 (previously 82.3). Most recent inflammatory markers from 9/28/2017 revealed a ESR 18 mm/hr (previously 6, 12 mm/hr) and CRP 1.5 mg/dL (previously 0.7, 1.4 mg/L). The patient has not had any interval hospital admissions, infections, or surgeries. REVIEW OF SYSTEMS    A comprehensive review of systems was performed and pertinent results are documented in the HPI, review of systems is otherwise non-contributory. PAST MEDICAL HISTORY    She has a past medical history of Anemia (9/2/2009); Colon cancer ([facility] (9/2/2009); [facility] (9/2/2009); DM (diabetes mellitus) ([facility] (9/2/2009); GERD (gastroesophageal reflux disease); H/O: CVA (9/2/2009); Hypothyroid (9/2/2009); Ill-defined condition; Microalbuminuria (9/2/2009); Osteoporosis (9/2/2009); Other and unspecified hyperlipidemia (1/27/2010); Rheumatoid arthritis involving ankle ([facility] (9/28/2016); Rheumatoid arthritis(714.0) (9/2/2009); Unspecified essential hypertension (9/2/2009); and Weakness due to cerebrovascular accident. She also has no past medical history of Abuse; Adult physical abuse; Arrhythmia; Asthma; CAD (coronary artery disease); Calculus of kidney; Chronic kidney disease; Chronic pain; Congestive heart failure, unspecified; Contact dermatitis and other eczema, due to unspecified cause; COPD; Depression; Headache(784.0); Liver disease; Psychotic disorder; PUD (peptic ulcer disease); Seizures ([facility] Thromboembolus ([facility] Unspecified adverse effect of anesthesia; or Unspecified sleep apnea. FAMILY HISTORY    Her family history includes Cancer in her father; Diabetes in her brother, mother, sister, and sister; Other in her sister; Stroke in her mother. SOCIAL HISTORY    She reports that she has never smoked.  She has never used smokeless tobacco. She reports that she does not drink alcohol or use illicit drugs.    MEDICATIONS    Current Outpatient Prescriptions   Medication Sig Dispense Refill    etanercept (ENBREL) 50 mg/mL (0.98 mL) injection 1 mL by SubCUTAneous route every seven (7) days. 4 Syringe 6    FORTEO 20 mcg/dose - 600 mcg/2.4 mL pnij injection USE 1 INJECTION (20 MCG OR 0.08ML) UNDER THE SKIN ONCE DAILY. 2.4 mL 0    clopidogrel (PLAVIX) 75 mg tab Take 1 Tab by mouth daily. 90 Tab 0    methotrexate, PF, (OTREXUP, PF,) 25 mg/0.4 mL atIn 25 mg by SubCUTAneous route Every Thursday. 4 Pen 11    evolocumab (REPATHA SURECLICK) pen injection 1 mL by SubCUTAneous route every fourteen (14) days. 2 Pen 11    folic acid (FOLVITE) 1 mg tablet TAKE ONE TABLET BY MOUTH DAILY 90 Tab 2    BD INSULIN PEN NEEDLE UF MINI 31 gauge x 3/16\" ndle USE AS DIRECTED WITH FORTEO PEN 12 Pen Needle 3    carvedilol (COREG) 25 mg tablet Take 25 mg by mouth two (2) times a day.  potassium chloride SR (KLOR-CON 10) 10 mEq tablet Take 20 mEq by mouth daily.  amLODIPine (NORVASC) 5 mg tablet Take 5 mg by mouth daily.  MONOJECT TB SAFETY SYRINGE 1 mL 25 gauge x 5/8\" syrg Use 1 syringe with methotrexate as directed. 12 Syringe 3    levothyroxine (SYNTHROID) 112 mcg tablet Take  by mouth Daily (before breakfast).  chlorthalidone (HYGROTEN) 50 mg tablet Take 50 mg by mouth daily.  sitaGLIPtin (JANUVIA) 25 mg tablet Take 50 mg by mouth daily.  CINNAMON BARK (CINNAMON PO) Take 250 mg by mouth daily.  OTHER L-citrulline malate complex 750mg. Takes one po daily.  VIT C/VIT E/LUTEIN/MIN/OMEGA-3 (OCUVITE PO) Take  by mouth. Takes one po daily.  MAGNESIUM MALATE Take 200 mg by mouth daily.  MULTIVITAMIN WITH MINERALS (HAIR,SKIN & NAILS PO) Take  by mouth. Takes one po daily.  aspirin delayed-release 81 mg tablet Take 81 mg by mouth daily.  metFORMIN (GLUCOPHAGE) 1,000 mg tablet Take 1 Tab by mouth two (2) times daily (with meals).  180 Tab 1    LECITHIN PO Take 800 mg by mouth two (2) times a day.  OMEGA-3 FATTY ACIDS/FISH OIL (OMEGA 3 FISH OIL PO) Take 300 mg by mouth daily.  ascorbate calcium (MAKAYLA-C) 500 mg Tab Take 1,000 mg by mouth daily.  CHOLECALCIFEROL, VITAMIN D3, (VITAMIN D-3 PO) Take 1,000 Units by mouth daily.  alirocumab (PRALUENT PEN) 75 mg/mL injector pen 1 mL by SubCUTAneous route Once every 2 weeks. 2 Syringe 11        ALLERGIES    Allergies   Allergen Reactions    Statins-Hmg-Coa Reductase Inhibitors Other (comments)     Intolerant to statins     Sulfa (Sulfonamide Antibiotics) Other (comments)     syncope       PHYSICAL EXAMINATION    Visit Vitals    /64    Pulse 87    Temp 98.4 °F (36.9 °C)    Resp 18    Ht 5' 1\" (1.549 m)    Wt 124 lb (56.2 kg)    BMI 23.43 kg/m2     Body mass index is 23.43 kg/(m^2). General: Patient is alert, oriented x 3, not in acute distress, son at bedside    HEENT:   Sclerae are not injected and appear moist.  Oral mucous membranes are moist, there are no ulcers present. There is no alopecia. Cardiovascular:  Heart is regular rate and rhythm, no murmurs. Chest:  Lungs are clear to auscultation bilaterally. No rhonchi, wheezes, or crackles. Extremities:  Free of clubbing, cyanosis, edema    Neurological exam:  Negative Tinel's    Skin exam:  There are no rashes, no alopecia, no discoid lesions, no active Raynaud's, no livedo reticularis, no periungual erythema. Post-inflammatory hyperpigmentation from Pustular herpetic lesions from her left lower back dermatomally with anterior involvement    Musculoskeletal exam:  A comprehensive musculoskeletal exam was performed for all joints of each upper and lower extremity and assessed for swelling, tenderness and range of motion.  Positive results are documented as below:    Bilateral ankle tenderness and swelling (RESOLVED on LEFT)  Right MTP tenderness (RESOLVED)  Bilateral knee crepitus without effusion     Joint Count 4/3/2018 12/20/2017 9/28/2017 6/16/2017 2/16/2017 12/19/2016 11/21/2016   Patient pain (0-100) 15 30 (No Data) 10 60 30 50   MHAQ 1.615 0.75 (No Data) 0.875 2 1.38 1.63   Left elbow - Tender - - - - 1 - -   Left elbow - Swollen - - - - 1 - -   Left wrist- Tender - - 1 - - - 1   Left wrist- Swollen - - - 1 1 - 1   Left 1st MCP - Tender - - - - - - 1   Left 1st MCP - Swollen - - 1 1 1 1 1   Left 2nd MCP - Tender - - - - - - 1   Left 2nd MCP - Swollen - - - - - - 1   Left 3rd MCP - Tender - - 1 - - 1 1   Left 3rd MCP - Swollen - - 1 - 1 1 1   Left 4th MCP - Tender - - - - - - 1   Left 4th MCP - Swollen - - - - - - 1   Left 5th MCP - Tender - - - - - - 1   Left 5th MCP - Swollen - - - 1 - - 1   Left thumb IP - Tender - - - - - - 1   Left thumb IP - Swollen - - - - - - 1   Left 2nd PIP - Tender - - - - - - 1   Left 2nd PIP - Swollen - - - - - - 1   Left 3rd PIP - Tender - - - - 1 - 1   Left 3rd PIP - Swollen - - - - - - 1   Left 4th PIP - Tender - - - - - - 1   Left 5th PIP - Tender - - - - - - 1   Right shoulder - Tender - - 1 - - - -   Right elbow - Tender - - - 1 1 - -   Right elbow - Swollen - - - 1 1 - -   Right wrist- Tender - 1 1 1 1 1 1   Right wrist- Swollen 1 1 1 1 1 1 1   Right 1st MCP - Tender 1 - 1 1 1 1 1   Right 1st MCP - Swollen 1 - 1 1 1 1 1   Right 2nd MCP - Tender 1 - - - - - 1   Right 2nd MCP - Swollen - - - 1 - - 1   Right 3rd MCP - Tender 1 1 1 1 1 1 1   Right 3rd MCP - Swollen 1 1 1 1 1 1 1   Right 4th MCP - Tender 1 1 1 1 - 1 1   Right 4th MCP - Swollen 1 1 1 1 - 1 1   Right 5th MCP - Tender 1 - 1 1 1 1 1   Right 5th MCP - Swollen 1 - 1 1 1 1 -   Right thumb IP - Tender 1 - - 1 - 1 -   Right 2nd PIP - Tender 1 - - 1 1 - 1   Right 2nd PIP - Swollen 1 - - 1 1 - 1   Right 3rd PIP - Tender 1 - - 1 1 - 1   Right 4th PIP - Tender 1 - 1 1 1 - 1   Right 5th PIP - Tender - - 1 1 1 - 1   Tender Joint Count (Total) 9 3 10 11 11 7 -   Swollen Joint Count (Total) 6 3 7 11 10 7 -   Physician Assessment (0-10) 3 2 4 3 3 3 3   Patient Assessment (0-10) 4.5 2.5 (No Data) 0.5 5.5 6 8   CDAI Total (calculated) 22.5 10.5 - 25.5 29.5 23 -        DATA REVIEW    Laboratory     Recent laboratory results were reviewed, summarized, and discussed with the patient. Imaging    Musculoskeletal Ultrasound    None    Radiographs    Chest 2/09/2018: Cardiac silhouette is enlarged. Pulmonary vasculature is not engorged. There are no focal consolidation, effusions, or pneumothorax. Ossified granuloma right upper lobe. Aorta remains atherosclerotic     Right shoulder 2/09/2018: no fracture, dislocation or other acute abnormality. Bones are osteopenic. Incidental granuloma right upper lobe    Lumbar 2/09/2018: superior endplate compression of L3 with approximately 50% loss of vertebral body height. Superior endplate compression of T12 with approximately one third loss of vertebral body height. Slight compression of T10. Osteopenia. Degenerative changes throughout the spine. Vascular calcifications      Chest 9/28/2016: normal heart size. Is atherosclerotic change of the aorta. The lungs are clear acute process. There is calcified granuloma in the right upper lobe with calcified right hilar lymph nodes. There is moderate compression deformity of approximately the T12 vertebral body that appears chronic. There are degenerative changes of the thoracic spine. Bilateral Hand 9/28/2016: LEFT: Bones are osteopenic. No acute fracture or dislocation. Moderate radiocarpal joint osteoarthritis. Age-appropriate intercarpal and first [facility] joint osteoarthritis. There are erosions at the third MCP joint. No other erosion. No other joint space narrowing. No soft tissue calcification.  RIGHT: No acute fracture. Old radius intra-articular fracture has healed. Increased now moderate radiocarpal joint osteoarthritis. Increased mild first MCP joint osteoarthritis. Increased mild-moderate first IP joint osteoarthritis. No joint space erosion or periosteal reaction.  Alignment is within normal limits. Osteopenia is unchanged. No soft tissue calcification. Bilateral Foot 9/28/2016: LEFT: Bones are osteopenic. No acute fracture or dislocation. No erosion. Age-appropriate bilateral first MTP joint osteoarthritis. Minimal intertarsal osteoarthritis for age. No periosteal reaction or soft tissue calcification. RIGHT: Bones are osteopenic. No acute fracture or dislocation. No erosion. Age-appropriate bilateral first MTP joint osteoarthritis. Minimal intertarsal osteoarthritis for age. No periosteal reaction or soft tissue calcification. Right Hand 4/10/2010: showed demonstrate osteopenia. Samuel Phillips is an acute T-shaped intra-articular distal radial fracture. Ulna styloid peri like avulsion is also noted. There is soft tissue swelling    CT Imaging    CT Head without contrast 2/09/2018: There is diffuse age-related parenchymal volume loss. The ventricles and sulci are age-appropriate without hydrocephalus. There is no mass effect or midline shift. There is no intracranial hemorrhage or extra-axial fluid collection. Scattered foci of low attenuation in the periventricular white matter most likely represent age-indeterminate microvascular ischemic changes. The gray-white matter differentiation is maintained. The basal cisterns are patent. The osseous structures are intact. There is diffuse paranasal sinus opacification with aerated secretions and fluid levels in the maxillary and sphenoid sinuses. The mastoid air cells are clear. MR Imaging    MRI Brain with and without contrast 3/19/2018: EXTRA-AXIAL SPACES: Prominent cortical sulci, consistent with cerebral volume loss. Prominence of the sylvian fissures and basal cisterns. INTRACRANIAL HEMORRHAGE: None. VENTRICULAR SYSTEM:  Normal in size and morphology for the patient's age.  BASAL CISTERNS:  Normal. CEREBRAL PARENCHYMA:  Extensive scattered and confluent foci of FLAIR/T2 hyperintensity in the cerebral white matter, most likely due to intracranial small vessel disease. No enhancing lesions. No evidence of diffusion restriction. MIDLINE SHIFT: None. CEREBELLUM:  Inferior vermian hypoplasia. Mild volume loss. BRAINSTEM:  Diffusely atrophic. Chronic bilateral pontine lacunar infarcts, right more extensive than left. CALVARIUM:  Multiple scattered T1 hypointense foci in the calvarium, suspicious for metastatic disease. Not identified as destructive lesions on recent head CT. VASCULAR SYSTEM:  Normal flow voids. PARANASAL SINUSES AND MASTOID AIR CELLS:  Clear. VISUALIZED ORBITS: Previous bilateral cataract surgery. VISUALIZED UPPER CERVICAL SPINE:  Normal. SELLA:  Normal. SKULL BASE: Normal. No cranial nerve thickening or abnormal enhancement, but some images compromised by patient motion. DXA     DXA 11/23/2016: (L3 for compression fracture and L4 for spondylitic change) lumbar spine L1-L2 T score -1.7 (BMD 0.971 g/cm2), right femoral neck T score: -2.0 (0.757 g/cm2), righttotal hip T score: -1.7 (0.791 g/cm2), and distal one third left radius T score -4.6 (0.472 g/cm2). FRAX score 33.2 % probability in 10 years for major osteoporotic fracture and 21 % 10 year probability of hip fracture. Echocardiogram    Echocardiogram 8/23/2013: Left ventricle: Systolic function was normal. Ejection fraction was estimated in the range of 55 % to 60 %. There were no regional wall motion abnormalities. Doppler parameters were consistent with abnormal left ventricular relaxation (grade 1 diastolic dysfunction). Left atrium: The atrium was moderately dilated. Atrial septum: There was a probable patent foramen ovale. Right atrium: The atrium was mildly dilated. Mitral valve: There was mild annular calcification. Aortic valve: The valve was not visualized well enough to rule out a bicuspid morphology. Transaortic velocity was increased due to increased transvalvular flow. Tricuspid valve: There was mild regurgitation.     ASSESSMENT AND PLAN    This is a follow-up visit for Ms. Ledesma. 1) Seropositive Erosive Nodular Rheumatoid Arthritis. She is maintained on methotrexate (Otrexup) 25 mg every Wednesday and Enbrel with good tolerance. She feels better but has numbness in her hands that is constant. She has negative Tinel's. Her CDAI was 22.5 (previously 10.5, 25.5, 29.5, 23, 47, 23) with 9 tender and 6 swollen joints, consistent with hazel disease activity. I will discontinue methotrexate due to possible renal deterioration, and I suspect I may have to replace Enbrel. I ordered labs today and will have her follow up in 4 weeks; by then, I would have the results of her bone scan. See #11.    2) Long Term Use of Immunosuppressants. The patient remains on immunomodulatory medications (methotrexate, Enbrel) and requires frequent toxicity monitoring by blood work. Respective labs were ordered (CBC and CMP). I discontinued methotrexate today. 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 She is taking calcium 1200 mg and 1000 of vitamin D daily. She is on Forteo with good tolerance.    4) Bilateral Knee Osteoarthritis. This was not an active issue. 5) Abnormal Gait. She is dependent on a walker to ambulate. There are no recent falls. 6) Asymptomatic Hyperuricemia. Her uric acid was 11.7  mg/dL (previously 8.7, 7.2 mg/dL). There is no history of gout. 7) Vitamin D Deficiency. Her level was 67.0 (previously 82.3, 59.4). She is on 1000 daily      8) Shingles. This resolved. 9) Chronic Kidney Disease Stage 3. Her creatinine was 1.08 mg/dL and eGFR 49 (previous 0.98, 0.91 mg/dL and eGFR 55, 60). I will consider stopping methotrexate altogether. 10) Elevated ALT/ALK. This was noted since her last visit. Her ALT was 47 and . While ALK could be an inflammatory marker surrogate, it may also represent bony turnover or liver disease. Her ALT may have risen due to acute renal insufficiency causing methotrexate toxicity. I will repeat today.      11) Abnormal MRI. Her Brain MRI showed areas of focal abnormality within the calvarium bilaterally, of concern for metastatic disease. This was ordered by her ENT, Dr. Shira Maldonado. I ordered a bone scan. The patient voiced understanding of the aforementioned assessment and plan. Summary of plan was provided in the After Visit Summary patient instructions.      TODAY'S ORDERS    Orders Placed This Encounter    NM BONE SCAN [facility] BODY    CBC WITH AUTOMATED DIFF    METABOLIC PANEL, COMPREHENSIVE    C REACTIVE PROTEIN, QT    SED RATE (ESR)     Future Appointments  Date Time Provider Prasanna Diana   5/2/2018 4:40 PM Jean Madsen MD [facility]       Glen Correa MD, [address] [facility]    Adult Rheumatology   Musculoskeletal Ultrasound Certified  [address]   Phone [phone]  Fax [phone]

## 2018-04-03 NOTE — PATIENT INSTRUCTIONS
STOP methotrexate     Continue Enbrel    Please call the Patient Care Scheduling Team [phone] to schedule your test (bone scan).

## 2018-04-03 NOTE — TELEPHONE ENCOUNTER
Evette Castellanos a pharmacist from [facility] called because the patient needs a reauthorization for her prescription for Forteo and Enbrel.  The call back number is [phone], you can speak to any pharmacist.

## 2018-04-03 NOTE — MR AVS SNAPSHOT
[address] 
[phone] Patient: Yung Hendrickson MRN:  [id number] Visit Information Date & Time Provider Department Dept. Phone Encounter #  
 4/3/2018  4:00 PM Preethi Forman, [address] [id number] Follow-up Instructions Return in about 4 weeks (around 5/1/2018). Upcoming Health Maintenance Date Due  
 FOOT EXAM Q1 2/21/1945 EYE EXAM RETINAL OR DILATED Q1 2/21/1945 DTaP/Tdap/Td series (1 - Tdap) 2/21/1958 ZOSTER VACCINE AGE 60> 12/21/1996 GLAUCOMA SCREENING Q2Y 2/21/2002 Pneumococcal 65+ High/Highest Risk (2 of 2 - PPSV23) 6/29/2011 BREAST CANCER SCRN MAMMOGRAM 5/19/2012 COLONOSCOPY 4/20/2016 HEMOGLOBIN A1C Q6M 9/17/2016 MICROALBUMIN Q1 3/17/2017 Influenza Age 5 to Adult 8/1/2017 LIPID PANEL Q1 2/7/2018 MEDICARE YEARLY EXAM 3/28/2018 Allergies as of 4/3/2018  Review Complete On: 4/3/2018 By: Nay Eubanks RN Severity Noted Reaction Type Reactions Statins-hmg-coa Reductase Inhibitors  12/21/2016    Other (comments) Intolerant to statins Sulfa (Sulfonamide Antibiotics)  09/02/2009    Other (comments)  
 syncope Current Immunizations  Reviewed on 11/21/2016 Name Date Influenza High Dose Vaccine PF 11/1/2016 Influenza Vaccine Split 10/1/2011, 10/15/2010 Influenza Vaccine Whole 10/1/2004 ZZZ-RETIRED (DO NOT USE) Pneumococcal Vaccine (Unspecified Type) 6/29/2006 Not reviewed this visit You Were Diagnosed With   
  
 Codes Comments Seropositive rheumatoid arthritis of multiple sites [facility])    -  Primary ICD-10-CM: M05.79 ICD-9-CM: 714.0 Long-term use of immunosuppressant medication     ICD-10-CM: Z79.899 ICD-9-CM: V58.69 Abnormal MRI of head     ICD-10-CM: R93.0 ICD-9-CM: 793.0 Vitals BP Pulse Temp Resp Height(growth percentile) Weight(growth percentile) 131/64 87 98.4 °F (36.9 °C) 18 5' 1\" (1.549 m) 124 lb (56.2 kg) BMI OB Status Smoking Status 23.43 kg/m2 Hysterectomy Never Smoker BMI and BSA Data Body Mass Index Body Surface Area  
 23.43 kg/m 2 1.56 m 2 Preferred Pharmacy Pharmacy Name Phone [address] [address] [phone] Your Updated Medication List  
  
   
This list is accurate as of 4/3/18  4:37 PM.  Always use your most recent med list.  
  
  
  
  
 alirocumab 75 mg/mL injector pen Commonly known as:  PRALUENT PEN  
1 mL by SubCUTAneous route Once every 2 weeks. amLODIPine 5 mg tablet Commonly known as:  Britany Raddle Take 5 mg by mouth daily. aspirin delayed-release 81 mg tablet Take 81 mg by mouth daily. BD INSULIN PEN NEEDLE UF MINI 31 gauge x 3/16\" Ndle Generic drug:  Insulin Needles (Disposable) USE AS DIRECTED WITH FORTEO PEN  
  
 carvedilol 25 mg tablet Commonly known as:  Qi Waddell Take 25 mg by mouth two (2) times a day. chlorthalidone 50 mg tablet Commonly known as:  Rj Kelch Take 50 mg by mouth daily. CINNAMON PO Take 250 mg by mouth daily. clopidogrel 75 mg Tab Commonly known as:  PLAVIX Take 1 Tab by mouth daily. MAKAYLA-C 500 mg Tab Generic drug:  ascorbate calcium Take 1,000 mg by mouth daily. etanercept 50 mg/mL (0.98 mL) injection Commonly known as:  ENBREL  
1 mL by SubCUTAneous route every seven (7) days. evolocumab pen injection Commonly known as:  Rich Page 1 mL by SubCUTAneous route every fourteen (14) days. folic acid 1 mg tablet Commonly known as:  FOLVITE  
TAKE ONE TABLET BY MOUTH DAILY FORTEO 20 mcg/dose - 600 mcg/2.4 mL Pnij injection Generic drug:  teriparatide USE 1 INJECTION (20 MCG OR 0.08ML) UNDER THE SKIN ONCE DAILY.  HAIR,SKIN & NAILS PO  
 Take  by mouth. Takes one po daily. JANUVIA 25 mg tablet Generic drug:  SITagliptin Take 50 mg by mouth daily. LECITHIN PO Take 800 mg by mouth two (2) times a day. levothyroxine 112 mcg tablet Commonly known as:  SYNTHROID Take  by mouth Daily (before breakfast). MAGNESIUM MALATE Take 200 mg by mouth daily. metFORMIN 1,000 mg tablet Commonly known as:  GLUCOPHAGE Take 1 Tab by mouth two (2) times daily (with meals). methotrexate (PF) 25 mg/0.4 mL Atin Commonly known as:  OTREXUP (PF)  
25 mg by SubCUTAneous route Every Thursday. MONOJECT TB SAFETY SYRINGE 1 mL 25 gauge x 5/8\" Syrg Generic drug:  Syringe with Needle (Disp) Use 1 syringe with methotrexate as directed. OCUVITE PO Take  by mouth. Takes one po daily. OMEGA 3 FISH OIL PO Take 300 mg by mouth daily. OTHER  
L-citrulline malate complex 750mg. Takes one po daily. potassium chloride SR 10 mEq tablet Commonly known as:  KLOR-CON 10 Take 20 mEq by mouth daily. VITAMIN D3 PO Take 1,000 Units by mouth daily. We Performed the Following C REACTIVE PROTEIN, QT [00269 CPT(R)] CBC WITH AUTOMATED DIFF [55051 CPT(R)] METABOLIC PANEL, COMPREHENSIVE [69257 CPT(R)] SED RATE (ESR) [id number] CPT(R)] Follow-up Instructions Return in about 4 weeks (around 5/1/2018). To-Do List   
 04/03/2018 Imaging:  NM BONE SCAN [facility] BODY Patient Instructions STOP methotrexate Continue Enbrel Please call the Patient Care Scheduling Team [phone] to schedule your test (bone scan). Introducing [facility]! [employer] introduces [employer] patient portal. Now you can access parts of your medical record, email your doctor's office, and request medication refills online. 1. In your internet browser, go to [email or web address] 2. Click on the First Time User? Click Here link in the Sign In box. You will see the New Member Sign Up page. 3. Enter your [employer] Access Code exactly as it appears below. You will not need to use this code after youve completed the sign-up process. If you do not sign up before the expiration date, you must request a new code. · [employer] Access Code: [email or web address] Expires: 5/10/2018  2:02 PM 
 
4. Enter the last four digits of your Social Security Number (xxxx) and Date of Birth (mm/dd/yyyy) as indicated and click Submit. You will be taken to the next sign-up page. 5. Create a [employer] ID. This will be your [employer] login ID and cannot be changed, so think of one that is secure and easy to remember. 6. Create a [employer] password. You can change your password at any time. 7. Enter your Password Reset Question and Answer. This can be used at a later time if you forget your password. 8. Enter your e-mail address. You will receive e-mail notification when new information is available in [address] 9. Click Sign Up. You can now view and download portions of your medical record. 10. Click the Download Summary menu link to download a portable copy of your medical information. If you have questions, please visit the Frequently Asked Questions section of the [employer] website. Remember, [employer] is NOT to be used for urgent needs. For medical emergencies, dial 911. Now available from your iPhone and Android! Please provide this summary of care documentation to your next provider. Your primary care clinician is listed as Sung Tadeo. If you have any questions after today's visit, please call [phone].

## 2018-04-04 LAB
ALBUMIN SERPL-MCNC: 3.8 G/DL (ref 3.5–4.7)
ALBUMIN/GLOB SERPL: 1.2 {RATIO} (ref 1.2–2.2)
ALP SERPL-CCNC: 339 IU/L (ref 39–117)
ALT SERPL-CCNC: 37 IU/L (ref 0–32)
AST SERPL-CCNC: 35 IU/L (ref 0–40)
BASOPHILS # BLD AUTO: 0 X10E3/UL (ref 0–0.2)
BASOPHILS NFR BLD AUTO: 1 %
BILIRUB SERPL-MCNC: 0.3 MG/DL (ref 0–1.2)
BUN SERPL-MCNC: 21 MG/DL (ref 8–27)
BUN/CREAT SERPL: 20 (ref 12–28)
CALCIUM SERPL-MCNC: 8.9 MG/DL (ref 8.7–10.3)
CHLORIDE SERPL-SCNC: 101 MMOL/L (ref 96–106)
CO2 SERPL-SCNC: 22 MMOL/L (ref 18–29)
CREAT SERPL-MCNC: 1.07 MG/DL (ref 0.57–1)
CRP SERPL-MCNC: 5.8 MG/L (ref 0–4.9)
EOSINOPHIL # BLD AUTO: 0.1 X10E3/UL (ref 0–0.4)
EOSINOPHIL NFR BLD AUTO: 2 %
ERYTHROCYTE [DISTWIDTH] IN BLOOD BY AUTOMATED COUNT: 23.2 % (ref 12.3–15.4)
ERYTHROCYTE [SEDIMENTATION RATE] IN BLOOD BY WESTERGREN METHOD: 41 MM/HR (ref 0–40)
GFR SERPLBLD CREATININE-BSD FMLA CKD-EPI: 49 ML/MIN/1.73
GFR SERPLBLD CREATININE-BSD FMLA CKD-EPI: 56 ML/MIN/1.73
GLOBULIN SER CALC-MCNC: 3.3 G/DL (ref 1.5–4.5)
GLUCOSE SERPL-MCNC: 137 MG/DL (ref 65–99)
HCT VFR BLD AUTO: 28.9 % (ref 34–46.6)
HGB BLD-MCNC: 9.2 G/DL (ref 11.1–15.9)
IMM GRANULOCYTES # BLD: 0 X10E3/UL (ref 0–0.1)
IMM GRANULOCYTES NFR BLD: 0 %
LYMPHOCYTES # BLD AUTO: 1.8 X10E3/UL (ref 0.7–3.1)
LYMPHOCYTES NFR BLD AUTO: 46 %
MCH RBC QN AUTO: 28.5 PG (ref 26.6–33)
MCHC RBC AUTO-ENTMCNC: 31.8 G/DL (ref 31.5–35.7)
MCV RBC AUTO: 90 FL (ref 79–97)
MONOCYTES # BLD AUTO: 0.6 X10E3/UL (ref 0.1–0.9)
MONOCYTES NFR BLD AUTO: 16 %
MORPHOLOGY BLD-IMP: ABNORMAL
NEUTROPHILS # BLD AUTO: 1.4 X10E3/UL (ref 1.4–7)
NEUTROPHILS NFR BLD AUTO: 35 %
PLATELET # BLD AUTO: 157 X10E3/UL (ref 150–379)
POTASSIUM SERPL-SCNC: 4.2 MMOL/L (ref 3.5–5.2)
PROT SERPL-MCNC: 7.1 G/DL (ref 6–8.5)
RBC # BLD AUTO: 3.23 X10E6/UL (ref 3.77–5.28)
SODIUM SERPL-SCNC: 141 MMOL/L (ref 134–144)
WBC # BLD AUTO: 3.9 X10E3/UL (ref 3.4–10.8)

## 2018-04-09 ENCOUNTER — TELEPHONE (OUTPATIENT)
Dept: CARDIOLOGY CLINIC | Age: 83
End: 2018-04-09

## 2018-04-09 ENCOUNTER — TELEPHONE (OUTPATIENT)
Dept: RHEUMATOLOGY | Age: 83
End: 2018-04-09

## 2018-04-09 NOTE — TELEPHONE ENCOUNTER
Faxed prior auth request to [facility] [employer] for [employer] for REGLA Pandey, to [employer] for Enbrel.

## 2018-04-09 NOTE — TELEPHONE ENCOUNTER
[facility] with erick is calling to check on the PA that is waiting for a signature   Phone: [phone]

## 2018-04-13 ENCOUNTER — TELEPHONE (OUTPATIENT)
Dept: RHEUMATOLOGY | Age: 83
End: 2018-04-13

## 2018-04-13 NOTE — TELEPHONE ENCOUNTER
Spoke with pt's son who called stating that the insurance company sent them paperwork stating something about Enbrel having a drug to drug interaction with something else that she is taking. I told him that I do not remember seeing any forms stating that but I would double check with [facility] and make sure that he doesn't think that there is any reactions that he knows of. She is not currently taking the Rasuvo due to her liver enzymes. He wants to make sure that it is ok that she continues Enbrel because he thinks that she is having some pain and he thinks she might be getting ready to have a flare since she is off the Rasuvo. She is due to have her bone scan on Monday so he wanted to let  [facility] know to look out for those results. I told him I would pass the message on.

## 2018-04-13 NOTE — TELEPHONE ENCOUNTER
Pt's son is concerned about a drug interaction. Pt is having a Bone scan on Monday, does it make to see  on the 19th or 20th. Please call this son for more details at [phone]/teresa.

## 2018-04-16 ENCOUNTER — APPOINTMENT (OUTPATIENT)
Dept: GENERAL RADIOLOGY | Age: 83
DRG: 853 | End: 2018-04-16
Attending: EMERGENCY MEDICINE
Payer: MEDICARE

## 2018-04-16 ENCOUNTER — HOSPITAL ENCOUNTER (INPATIENT)
Age: 83
LOS: 25 days | Discharge: NURSING, REHAB OR LONG-TERM CARE | DRG: 853 | End: 2018-05-11
Attending: EMERGENCY MEDICINE | Admitting: INTERNAL MEDICINE
Payer: MEDICARE

## 2018-04-16 DIAGNOSIS — N18.9 CHRONIC KIDNEY DISEASE, UNSPECIFIED CKD STAGE: ICD-10-CM

## 2018-04-16 DIAGNOSIS — R77.8 ELEVATED TROPONIN: ICD-10-CM

## 2018-04-16 DIAGNOSIS — I50.9 ACUTE CONGESTIVE HEART FAILURE, UNSPECIFIED HEART FAILURE TYPE (HCC): Primary | ICD-10-CM

## 2018-04-16 DIAGNOSIS — J96.01 ACUTE RESPIRATORY FAILURE WITH HYPOXIA (HCC): ICD-10-CM

## 2018-04-16 DIAGNOSIS — R29.810 WEAKNESS ON RIGHT SIDE OF FACE: ICD-10-CM

## 2018-04-16 DIAGNOSIS — R53.1 WEAKNESS: ICD-10-CM

## 2018-04-16 DIAGNOSIS — R06.02 SOB (SHORTNESS OF BREATH): ICD-10-CM

## 2018-04-16 DIAGNOSIS — N63.0 LUMP OR MASS IN BREAST: ICD-10-CM

## 2018-04-16 DIAGNOSIS — R41.82 ALTERED MENTAL STATUS, UNSPECIFIED ALTERED MENTAL STATUS TYPE: ICD-10-CM

## 2018-04-16 LAB
ALBUMIN SERPL-MCNC: 2.5 G/DL (ref 3.5–5)
ALBUMIN/GLOB SERPL: 0.4 {RATIO} (ref 1.1–2.2)
ALP SERPL-CCNC: 406 U/L (ref 45–117)
ALT SERPL-CCNC: 107 U/L (ref 12–78)
ANION GAP SERPL CALC-SCNC: 9 MMOL/L (ref 5–15)
ARTERIAL PATENCY WRIST A: YES
AST SERPL-CCNC: 90 U/L (ref 15–37)
ATRIAL RATE: 105 BPM
ATRIAL RATE: 115 BPM
BASE DEFICIT BLD-SCNC: 4 MMOL/L
BASOPHILS # BLD: 0 K/UL (ref 0–0.1)
BASOPHILS NFR BLD: 0 % (ref 0–1)
BDY SITE: ABNORMAL
BILIRUB SERPL-MCNC: 0.4 MG/DL (ref 0.2–1)
BNP SERPL-MCNC: 1668 PG/ML (ref 0–450)
BUN SERPL-MCNC: 25 MG/DL (ref 6–20)
BUN/CREAT SERPL: 18 (ref 12–20)
CALCIUM SERPL-MCNC: 9.6 MG/DL (ref 8.5–10.1)
CALCULATED P AXIS, ECG09: 41 DEGREES
CALCULATED P AXIS, ECG09: 51 DEGREES
CALCULATED R AXIS, ECG10: 53 DEGREES
CALCULATED R AXIS, ECG10: 73 DEGREES
CALCULATED T AXIS, ECG11: -16 DEGREES
CALCULATED T AXIS, ECG11: 108 DEGREES
CHLORIDE SERPL-SCNC: 103 MMOL/L (ref 97–108)
CHOLEST SERPL-MCNC: 74 MG/DL
CO2 SERPL-SCNC: 23 MMOL/L (ref 21–32)
CREAT SERPL-MCNC: 1.36 MG/DL (ref 0.55–1.02)
DIAGNOSIS, 93000: NORMAL
DIAGNOSIS, 93000: NORMAL
DIFFERENTIAL METHOD BLD: ABNORMAL
EOSINOPHIL # BLD: 0.1 K/UL (ref 0–0.4)
EOSINOPHIL NFR BLD: 1 % (ref 0–7)
ERYTHROCYTE [DISTWIDTH] IN BLOOD BY AUTOMATED COUNT: 23.6 % (ref 11.5–14.5)
GAS FLOW.O2 O2 DELIVERY SYS: ABNORMAL L/MIN
GLOBULIN SER CALC-MCNC: 6 G/DL (ref 2–4)
GLUCOSE BLD STRIP.AUTO-MCNC: 240 MG/DL (ref 65–100)
GLUCOSE SERPL-MCNC: 301 MG/DL (ref 65–100)
HCO3 BLD-SCNC: 22.1 MMOL/L (ref 22–26)
HCT VFR BLD AUTO: 30.4 % (ref 35–47)
HDLC SERPL-MCNC: 25 MG/DL
HDLC SERPL: 3 {RATIO} (ref 0–5)
HGB BLD-MCNC: 9.4 G/DL (ref 11.5–16)
IMM GRANULOCYTES # BLD: 0 K/UL
IMM GRANULOCYTES NFR BLD AUTO: 0 %
LDLC SERPL CALC-MCNC: 31.6 MG/DL (ref 0–100)
LIPID PROFILE,FLP: NORMAL
LYMPHOCYTES # BLD: 1.2 K/UL (ref 0.8–3.5)
LYMPHOCYTES NFR BLD: 10 % (ref 12–49)
MAGNESIUM SERPL-MCNC: 2.2 MG/DL (ref 1.6–2.4)
MCH RBC QN AUTO: 29.2 PG (ref 26–34)
MCHC RBC AUTO-ENTMCNC: 30.9 G/DL (ref 30–36.5)
MCV RBC AUTO: 94.4 FL (ref 80–99)
MONOCYTES # BLD: 1.3 K/UL (ref 0–1)
MONOCYTES NFR BLD: 11 % (ref 5–13)
NEUTS SEG # BLD: 9.4 K/UL (ref 1.8–8)
NEUTS SEG NFR BLD: 78 % (ref 32–75)
NRBC # BLD: 0.03 K/UL (ref 0–0.01)
NRBC BLD-RTO: 0.3 PER 100 WBC
O2/TOTAL GAS SETTING VFR VENT: 35 %
P-R INTERVAL, ECG05: 150 MS
P-R INTERVAL, ECG05: 154 MS
PCO2 BLD: 43.5 MMHG (ref 35–45)
PEEP RESPIRATORY: 5 CMH2O
PH BLD: 7.31 [PH] (ref 7.35–7.45)
PIP ISTAT,IPIP: 10
PLATELET # BLD AUTO: 561 K/UL (ref 150–400)
PMV BLD AUTO: 10.1 FL (ref 8.9–12.9)
PO2 BLD: 110 MMHG (ref 80–100)
POTASSIUM SERPL-SCNC: 5.2 MMOL/L (ref 3.5–5.1)
PRESSURE SUPPORT SETTING VENT: 5 CMH2O
PROT SERPL-MCNC: 8.5 G/DL (ref 6.4–8.2)
Q-T INTERVAL, ECG07: 330 MS
Q-T INTERVAL, ECG07: 356 MS
QRS DURATION, ECG06: 80 MS
QRS DURATION, ECG06: 88 MS
QTC CALCULATION (BEZET), ECG08: 436 MS
QTC CALCULATION (BEZET), ECG08: 492 MS
RBC # BLD AUTO: 3.22 M/UL (ref 3.8–5.2)
RBC MORPH BLD: ABNORMAL
SAO2 % BLD: 98 % (ref 92–97)
SERVICE CMNT-IMP: ABNORMAL
SODIUM SERPL-SCNC: 135 MMOL/L (ref 136–145)
SPECIMEN TYPE: ABNORMAL
SPONTANEOUS TIMED, IST: YES
TRIGL SERPL-MCNC: 87 MG/DL (ref ?–150)
TROPONIN I SERPL-MCNC: 0.56 NG/ML
TROPONIN I SERPL-MCNC: 3.28 NG/ML
VENTRICULAR RATE, ECG03: 105 BPM
VENTRICULAR RATE, ECG03: 115 BPM
VLDLC SERPL CALC-MCNC: 17.4 MG/DL
WBC # BLD AUTO: 12 K/UL (ref 3.6–11)

## 2018-04-16 PROCEDURE — 83735 ASSAY OF MAGNESIUM: CPT | Performed by: EMERGENCY MEDICINE

## 2018-04-16 PROCEDURE — 93005 ELECTROCARDIOGRAM TRACING: CPT

## 2018-04-16 PROCEDURE — 74011250636 HC RX REV CODE- 250/636: Performed by: NURSE PRACTITIONER

## 2018-04-16 PROCEDURE — 65270000029 HC RM PRIVATE

## 2018-04-16 PROCEDURE — 84484 ASSAY OF TROPONIN QUANT: CPT | Performed by: EMERGENCY MEDICINE

## 2018-04-16 PROCEDURE — 5A09557 ASSISTANCE WITH RESPIRATORY VENTILATION, GREATER THAN 96 CONSECUTIVE HOURS, CONTINUOUS POSITIVE AIRWAY PRESSURE: ICD-10-PCS | Performed by: INTERNAL MEDICINE

## 2018-04-16 PROCEDURE — 85025 COMPLETE CBC W/AUTO DIFF WBC: CPT | Performed by: EMERGENCY MEDICINE

## 2018-04-16 PROCEDURE — 82803 BLOOD GASES ANY COMBINATION: CPT

## 2018-04-16 PROCEDURE — 36600 WITHDRAWAL OF ARTERIAL BLOOD: CPT

## 2018-04-16 PROCEDURE — 74011250637 HC RX REV CODE- 250/637: Performed by: INTERNAL MEDICINE

## 2018-04-16 PROCEDURE — 83880 ASSAY OF NATRIURETIC PEPTIDE: CPT | Performed by: EMERGENCY MEDICINE

## 2018-04-16 PROCEDURE — 74011250637 HC RX REV CODE- 250/637: Performed by: EMERGENCY MEDICINE

## 2018-04-16 PROCEDURE — 71045 X-RAY EXAM CHEST 1 VIEW: CPT

## 2018-04-16 PROCEDURE — 74011250636 HC RX REV CODE- 250/636: Performed by: INTERNAL MEDICINE

## 2018-04-16 PROCEDURE — 36415 COLL VENOUS BLD VENIPUNCTURE: CPT | Performed by: EMERGENCY MEDICINE

## 2018-04-16 PROCEDURE — 93306 TTE W/DOPPLER COMPLETE: CPT

## 2018-04-16 PROCEDURE — 80061 LIPID PANEL: CPT | Performed by: INTERNAL MEDICINE

## 2018-04-16 PROCEDURE — 99285 EMERGENCY DEPT VISIT HI MDM: CPT

## 2018-04-16 PROCEDURE — 80053 COMPREHEN METABOLIC PANEL: CPT | Performed by: EMERGENCY MEDICINE

## 2018-04-16 PROCEDURE — 94761 N-INVAS EAR/PLS OXIMETRY MLT: CPT

## 2018-04-16 PROCEDURE — 82962 GLUCOSE BLOOD TEST: CPT

## 2018-04-16 RX ORDER — SODIUM CHLORIDE 0.9 % (FLUSH) 0.9 %
5-10 SYRINGE (ML) INJECTION AS NEEDED
Status: DISCONTINUED | OUTPATIENT
Start: 2018-04-16 | End: 2018-05-11 | Stop reason: HOSPADM

## 2018-04-16 RX ORDER — CLOPIDOGREL BISULFATE 75 MG/1
75 TABLET ORAL DAILY
Status: DISCONTINUED | OUTPATIENT
Start: 2018-04-16 | End: 2018-04-26

## 2018-04-16 RX ORDER — ASCORBIC ACID 500 MG
500 TABLET ORAL DAILY
Status: DISCONTINUED | OUTPATIENT
Start: 2018-04-16 | End: 2018-05-02

## 2018-04-16 RX ORDER — FUROSEMIDE 10 MG/ML
40 INJECTION INTRAMUSCULAR; INTRAVENOUS 2 TIMES DAILY
Status: DISCONTINUED | OUTPATIENT
Start: 2018-04-16 | End: 2018-04-19

## 2018-04-16 RX ORDER — POTASSIUM CHLORIDE 750 MG/1
20 TABLET, FILM COATED, EXTENDED RELEASE ORAL DAILY
Status: DISCONTINUED | OUTPATIENT
Start: 2018-04-16 | End: 2018-04-18

## 2018-04-16 RX ORDER — LEVOTHYROXINE SODIUM 112 UG/1
112 TABLET ORAL
Status: DISCONTINUED | OUTPATIENT
Start: 2018-04-16 | End: 2018-04-16

## 2018-04-16 RX ORDER — LEVOTHYROXINE SODIUM 88 UG/1
88 TABLET ORAL
Status: DISCONTINUED | OUTPATIENT
Start: 2018-04-17 | End: 2018-05-04 | Stop reason: SDUPTHER

## 2018-04-16 RX ORDER — FUROSEMIDE 10 MG/ML
40 INJECTION INTRAMUSCULAR; INTRAVENOUS DAILY
Status: DISCONTINUED | OUTPATIENT
Start: 2018-04-16 | End: 2018-04-16

## 2018-04-16 RX ORDER — GUAIFENESIN 100 MG/5ML
325 LIQUID (ML) ORAL
Status: COMPLETED | OUTPATIENT
Start: 2018-04-16 | End: 2018-04-16

## 2018-04-16 RX ORDER — LEVOTHYROXINE SODIUM 88 UG/1
88 TABLET ORAL
COMMUNITY

## 2018-04-16 RX ORDER — SODIUM CHLORIDE 0.9 % (FLUSH) 0.9 %
5-10 SYRINGE (ML) INJECTION EVERY 8 HOURS
Status: DISCONTINUED | OUTPATIENT
Start: 2018-04-16 | End: 2018-05-11 | Stop reason: HOSPADM

## 2018-04-16 RX ORDER — GLIMEPIRIDE 1 MG/1
1 TABLET ORAL
COMMUNITY
End: 2018-07-02

## 2018-04-16 RX ORDER — CARVEDILOL 12.5 MG/1
25 TABLET ORAL 2 TIMES DAILY
Status: DISCONTINUED | OUTPATIENT
Start: 2018-04-16 | End: 2018-04-16 | Stop reason: SDUPTHER

## 2018-04-16 RX ORDER — CARVEDILOL 12.5 MG/1
25 TABLET ORAL 2 TIMES DAILY
Status: DISCONTINUED | OUTPATIENT
Start: 2018-04-16 | End: 2018-04-18

## 2018-04-16 RX ORDER — AMLODIPINE BESYLATE 5 MG/1
5 TABLET ORAL DAILY
Status: DISCONTINUED | OUTPATIENT
Start: 2018-04-16 | End: 2018-04-16

## 2018-04-16 RX ORDER — ACETAMINOPHEN 325 MG/1
650 TABLET ORAL
Status: DISCONTINUED | OUTPATIENT
Start: 2018-04-16 | End: 2018-04-26 | Stop reason: SDUPTHER

## 2018-04-16 RX ORDER — ONDANSETRON 2 MG/ML
4 INJECTION INTRAMUSCULAR; INTRAVENOUS
Status: DISCONTINUED | OUTPATIENT
Start: 2018-04-16 | End: 2018-05-11 | Stop reason: HOSPADM

## 2018-04-16 RX ORDER — ASPIRIN 81 MG/1
81 TABLET ORAL DAILY
Status: DISCONTINUED | OUTPATIENT
Start: 2018-04-16 | End: 2018-04-24

## 2018-04-16 RX ORDER — MELATONIN
1000 DAILY
Status: DISCONTINUED | OUTPATIENT
Start: 2018-04-16 | End: 2018-05-06

## 2018-04-16 RX ORDER — THERA TABS 400 MCG
1 TAB ORAL DAILY
Status: DISCONTINUED | OUTPATIENT
Start: 2018-04-16 | End: 2018-05-11 | Stop reason: HOSPADM

## 2018-04-16 RX ORDER — LANOLIN ALCOHOL/MO/W.PET/CERES
400 CREAM (GRAM) TOPICAL DAILY
Status: DISCONTINUED | OUTPATIENT
Start: 2018-04-16 | End: 2018-05-11 | Stop reason: HOSPADM

## 2018-04-16 RX ORDER — METFORMIN HYDROCHLORIDE 500 MG/1
1000 TABLET ORAL 2 TIMES DAILY WITH MEALS
Status: DISCONTINUED | OUTPATIENT
Start: 2018-04-16 | End: 2018-04-16

## 2018-04-16 RX ORDER — ENOXAPARIN SODIUM 100 MG/ML
30 INJECTION SUBCUTANEOUS EVERY 24 HOURS
Status: DISCONTINUED | OUTPATIENT
Start: 2018-04-16 | End: 2018-04-22 | Stop reason: SDUPTHER

## 2018-04-16 RX ORDER — FOLIC ACID 1 MG/1
1 TABLET ORAL DAILY
Status: DISCONTINUED | OUTPATIENT
Start: 2018-04-16 | End: 2018-05-06

## 2018-04-16 RX ORDER — GLIMEPIRIDE 2 MG/1
1 TABLET ORAL
Status: DISCONTINUED | OUTPATIENT
Start: 2018-04-16 | End: 2018-04-24

## 2018-04-16 RX ADMIN — VITAMIN D, TAB 1000IU (100/BT) 1000 UNITS: 25 TAB at 09:04

## 2018-04-16 RX ADMIN — ASPIRIN 81 MG 324 MG: 81 TABLET ORAL at 05:49

## 2018-04-16 RX ADMIN — ASPIRIN 81 MG: 81 TABLET, COATED ORAL at 09:04

## 2018-04-16 RX ADMIN — AMLODIPINE BESYLATE 5 MG: 5 TABLET ORAL at 09:04

## 2018-04-16 RX ADMIN — SITAGLIPTIN 50 MG: 25 TABLET, FILM COATED ORAL at 09:59

## 2018-04-16 RX ADMIN — CARVEDILOL 25 MG: 12.5 TABLET, FILM COATED ORAL at 11:48

## 2018-04-16 RX ADMIN — Medication 1 CAPSULE: at 09:08

## 2018-04-16 RX ADMIN — Medication 400 MG: at 09:07

## 2018-04-16 RX ADMIN — FOLIC ACID 1 MG: 1 TABLET ORAL at 09:04

## 2018-04-16 RX ADMIN — Medication 10 ML: at 18:03

## 2018-04-16 RX ADMIN — FUROSEMIDE 40 MG: 10 INJECTION, SOLUTION INTRAMUSCULAR; INTRAVENOUS at 09:09

## 2018-04-16 RX ADMIN — POTASSIUM CHLORIDE 20 MEQ: 750 TABLET, FILM COATED, EXTENDED RELEASE ORAL at 09:04

## 2018-04-16 RX ADMIN — THERA TABS 1 TABLET: TAB at 09:04

## 2018-04-16 RX ADMIN — LEVOTHYROXINE SODIUM 112 MCG: 112 TABLET ORAL at 09:07

## 2018-04-16 RX ADMIN — FUROSEMIDE 40 MG: 10 INJECTION, SOLUTION INTRAMUSCULAR; INTRAVENOUS at 17:59

## 2018-04-16 RX ADMIN — CARVEDILOL 25 MG: 12.5 TABLET, FILM COATED ORAL at 17:59

## 2018-04-16 RX ADMIN — OXYCODONE HYDROCHLORIDE AND ACETAMINOPHEN 500 MG: 500 TABLET ORAL at 09:59

## 2018-04-16 RX ADMIN — CLOPIDOGREL BISULFATE 75 MG: 75 TABLET ORAL at 09:59

## 2018-04-16 RX ADMIN — GLIMEPIRIDE 1 MG: 1 TABLET ORAL at 09:06

## 2018-04-16 RX ADMIN — ONDANSETRON 4 MG: 2 INJECTION INTRAMUSCULAR; INTRAVENOUS at 18:29

## 2018-04-16 NOTE — PROGRESS NOTES
04/16/18 0416   Oxygen Therapy   O2 Sat (%) 100 %   Pulse via Oximetry 110 beats per minute   O2 Device BIPAP   Respiratory   Respiratory Pattern Accessory muscles; Labored; Tachypneic   CPAP/BIPAP   CPAP/BIPAP Start/Stop On   Device Mode BIPAP   Mask Type and Size Full face; Medium   PIP Observed 11 cm H20   IPAP (cm H2O) 10 cm H2O   EPAP (cm H2O) 5 cm H2O   Inspiratory Time (sec) 1 seconds   Vt Spont (ml) 334 ml   Ve Observed (l/min) 10.9 l/min   Backup Rate 4   Total RR (Spontaneous) 26 breaths per minute   Leak (Estimated) 23 L/min   Pt's Home Machine No   Biomedical Check Performed Yes   Settings Verified Yes   Alarm Settings   High Pressure 30   Low Pressure 5   Apnea 20   Low Ve 3   High Rate 40   Low Rate 8

## 2018-04-16 NOTE — PROGRESS NOTES
Saw and examined pt in the ER room 1. Patient admitted this morning,see H&P for details. She reports feeling better,no chest pain. Tolerated off bipap during my exam and left on nasal canula,she may need the bipap prn though. A/P    -Acute hypoxic resp failure. Acute CHF. NSTEMI. On bipap. Awaiting IMCU bed. Discussed with Dr Sariah Nazario about rising trop. She needs cardiac cath prior or urgently if new NT elevation. Updated pt and her son.         Katie Brush MD

## 2018-04-16 NOTE — PROGRESS NOTES
Pharmacy Lovenox renal dosing protocol  Indication: DVT Prophylaxis  Recent Labs      04/16/18   0421   HGB  9.4*   PLT  561*   CREA  1.36*     Current Weight: 64.7 kg  Est. CrCl = 28 ml/min  Current Dose: 40 mg subcutaneously every 24 hours.   Plan: Change to 30  subcutaneously every 24 hours for CrCl < 30 ml/min

## 2018-04-16 NOTE — H&P
[address]  ACUTE CARE HISTORY AND PHYSICAL    Jefrey Fleischer.  MR#: [id number]  : 1935  ACCOUNT #: [de-identified]   DATE OF SERVICE: 2018    PRIMARY CARE PHYSICIAN:   Caryle Rosier MD    PRESENTING COMPLAINT:  Shortness of breath. HISTORY OF PRESENT ILLNESS:  The patient is an 28-year-old female with history of diabetes and rheumatoid arthritis who presented to the emergency room via EMS with complaints of shortness of breath. The patient reports the shortness of breath started today. Shortness of breath was associated with PND and orthopnea. Patient reports cough that is productive of whitish phlegm. There is no fever, chills or rigors. The patient has no nasal congestion, sore throat, epistaxis or rhinorrhea. The patient reports a prior episode of dyspnea on exertion. Patient has no palpitations, irregular heartbeat, anxiety or depression. She denied diaphoresis, wheeze, no rhonchi. She also denied chest pain or chest tightness. There is no report of trauma or fall. Patient has no headache, lightheadedness, dizziness, blurry vision, double vision, syncopal episode or seizures. She has no neck pain, neck stiffness or confusion. According to family, the patient was being worked up for congestive heart failure when she had a recent echocardiogram that has not been reported. Patient denied dysuria, frequency, hematuria, urethral discharge or vaginal discharge. There is no lower extremity pain or swelling. The patient has no back pain or flank pain. Patient denied loss of appetite, dysphagia or odynophagia. She also denied generalized weakness, focal weakness, numbness, tingling sensation or abnormal sensation. EMS reported that the patient was hypoxic when they presented to  the patient. Her O2 saturation was in the 70s on room air. Patient was placed on CPAP and O2 saturation improved.   EMS also gave the patient furosemide 50 mg intravenously. PAST MEDICAL HISTORY:  1. Anemia. 2.  Colon cancer, status post chemo and surgery. 3.  Diabetes mellitus. 4.  Gastroesophageal reflux disease. 5.  History of cerebrovascular accident with left-sided weakness. 6.  Hypothyroidism. 7.  Seasonal allergies. 8.  History of osteoporosis. 9.  Rheumatoid arthritis. 10.  Essential hypertension. PAST SURGICAL HISTORY:  1. Colectomy. 2.  Hysterectomy. 3.  Colonoscopies. MEDICATIONS:  Alirocumab 1 mL subQ once every 2 weeks, amlodipine 5 mg daily, ascorbate 1000 mg daily, aspirin 81 mg daily, carvedilol 25 mg 2 times daily, chlorthalidone 50 mg daily, cholecalciferol 1000 units daily, cinnamon bark 250 mg daily, clopidogrel 75 mg daily, etanercept 1 mL subQ every 7 days, evolocumab 1 mL subQ every 14 days, folic acid 1 mg daily, Amaryl, glimepiride 1 mg every morning, Lecithin 800 mg 2 times daily, levothyroxine 112 mcg daily, magnesium malate 200 mg daily, metformin 1000 mg 2 times daily, multivitamin 1 tablet daily, Omega 3 fatty acids 300 mg daily, potassium chloride 20 mEq daily, sitagliptin 50 mg daily. ALLERGIES:  STATINS, SULFA. SOCIAL HISTORY:  Patient is . She denies tobacco, alcohol or recreational drug use. FAMILY HISTORY:  Significant for diabetes in the mother, stroke in the mother, diabetes in the sister, diabetes in the brother, stomach cancer in the father, and diabetes in sister. REVIEW OF SYSTEMS:  More than 12 systems reviewed and the pertinent positives are in the history of present illness. All others are negative. PHYSICAL EXAMINATION:  GENERAL:  The patient is seen lying in bed on BiPAP. VITAL SIGNS:  Blood pressure 175/85, pulse 109, respirations 28, temperature 97.6, pulse ox 91%. HEENT:  Atraumatic, normocephalic. Anicteric, not jaundiced. Extraocular muscles intact. Pupils bilaterally equal, reactive, round. NECK:  Supple, no JVD, no carotid bruit.   HEART:  Heart sounds 1 and 2, regular rate and rhythm. No gallop, no friction, no rub. RESPIRATION:  No wheezing. Decreased air entry bilaterally. Bilateral basilar rales. ABDOMEN:  Soft, nontender. Bowel sounds normoactive. NEUROLOGIC:  Alert and oriented x2. Cranial nerves II-XII intact. SKIN:  Warm, dry, normal skin turgor. PSYCHIATRIC:  Normal mood, normal affect. BACK:  No CVA tenderness. EXTREMITIES:  1+ pitting edema bilateral lower extremities. DIAGNOSTIC TESTS:  EKG showed sinus tachy at 115 beats per minute. WBC 12, hemoglobin 9.4, hematocrit 30.4, platelets 924. Sodium 135, potassium 5.2, chloride 103, carbon dioxide 23, glucose 301, BUN 25, creatinine 1.36, calcium 9.6. , AST 19, alkaline phosphatase 406. Troponin 0.56. ProBNP 1668. ABG shows a pH of 7.314, pCO2 of 43.5, pO2 110, bicarbonate 22, O2 sat 98%, this is on BiPAP. Chest x-ray showed minimal pulmonary edema. ASSESSMENT:    1. Shortness of breath with cough. 2.  Congestive heart failure exacerbation. 3.  Elevated troponin due to tachycardia. 4.  AFib with RVR. 5.  Worsening chronic kidney disease. 6.  Anemia. 7.  Diabetes. 8.  Rheumatoid arthritis. 9.  Gastroesophageal reflux disease. 10. History of cerebrovascular accident with mild left-sided weakness. Patient uses rolling walker for ambulation. 11.  Hypothyroidism. 12.  Seasonal allergies. 13.  Osteoporosis. PLAN:    1. Admit the patient to intermediate care unit under hospitalist service. 2.  Continue the patient on BiPAP. Repeat ABG. 3.  Serial cardiac enzymes. Wean esmolol drip gradually to diltiazem po.  4.  Echocardiogram.  5.  Cardiology consult. 6.  Extensive patient education was done by bedside. 7.  Parenteral diuresis. 8.  Monitor vital signs including blood pressures per unit protocol. 9.  Restart appropriate home medications. 10.  Deep venous thrombosis prophylaxis, Lovenox.   11.  Fall precautions, skin care precautions, out of bed to chair with assistance. 12.  Daily weights. 13.  Low sodium and diabetic diet. 14.  Discussed Advanced Directives with patient. The sons are the next of kin.   15.  Code:  FULL CODE.    16.  Further management will depend on the patient's clinical progression, further evaluation by attending physician, results of tests and recommendations by cardiologist.        MD MARIA Messina Res / Chente Dacosta  D: 04/16/2018 06:15     T: 04/16/2018 07:16  JOB #: [id number]

## 2018-04-16 NOTE — IP AVS SNAPSHOT
[address] 
[phone] Patient: Richelle Shore MRN: [id number] PFB:[id number]/[id number] A check stella indicates which time of day the medication should be taken. My Medications START taking these medications Instructions Each Dose to Equal  
 Morning Noon Evening Bedtime  
 anastrozole 1 mg tablet Commonly known as:  ARIMIDEX Start taking on:  5/12/2018 Your last dose was: Your next dose is: Take 1 Tab by mouth daily. 1 mg  
    
   
   
   
  
 docusate sodium 100 mg capsule Commonly known as:  Nini Officer Your last dose was: Your next dose is: Take 1 Cap by mouth two (2) times a day for 90 days. 100 mg  
    
   
   
   
  
 epoetin celio 10,000 unit/mL injection Commonly known as:  EPOGEN;PROCRIT Your last dose was: Your next dose is:    
   
   
 1 mL by SubCUTAneous route every Monday, Wednesday, Friday. 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  
 16572 Units  
    
   
   
   
  
 furosemide 20 mg tablet Commonly known as:  LASIX Start taking on:  5/12/2018 Your last dose was: Your next dose is: Take one pill daily by mouth . L. acidoph & paracasei- S therm- Bifido 8 billion cell Cap cap Commonly known as:  BERYL-Q/RISAQUAD Start taking on:  5/12/2018 Your last dose was: Your next dose is: Take 1 Cap by mouth daily. 1 Cap  
    
   
   
   
  
 levoFLOXacin 750 mg tablet Commonly known as:  Supa Vasquez Your last dose was: Your next dose is: Take 1 Tab by mouth every fourty-eight (48) hours for 5 days. 750 mg  
    
   
   
   
  
 lisinopril 2.5 mg tablet Commonly known as:  Sara Gama Start taking on:  5/12/2018 Your last dose was: Your next dose is: Take 1 Tab by mouth daily.   
 2.5 mg  
    
 phosphorus 250 mg tablet Commonly known as:  K PHOS NEUTRAL Your last dose was: Your next dose is: Take 1 Tab by mouth two (2) times a day. 1 Tab  
    
   
   
   
  
 polyethylene glycol 17 gram packet Commonly known as:  Orysia [place] Start taking on:  5/12/2018 Your last dose was: Your next dose is: Take 1 Packet by mouth daily. 17 g  
    
   
   
   
  
 spironolactone 25 mg tablet Commonly known as:  ALDACTONE Start taking on:  5/12/2018 Your last dose was: Your next dose is: Take 1 Tab by mouth daily. 25 mg CHANGE how you take these medications Instructions Each Dose to Equal  
 Morning Noon Evening Bedtime  
 carvedilol 6.25 mg tablet Commonly known as:  Elise Ready What changed:   
- medication strength 
- how much to take - when to take this Your last dose was: Your next dose is: Take 1 Tab by mouth two (2) times daily (with meals). 6.25 mg SYNTHROID 88 mcg tablet Generic drug:  levothyroxine What changed:  Another medication with the same name was removed. Continue taking this medication, and follow the directions you see here. Your last dose was: Your next dose is: Take 88 mcg by mouth Daily (before breakfast). 88 mcg CONTINUE taking these medications Instructions Each Dose to Equal  
 Morning Noon Evening Bedtime  
 aspirin delayed-release 81 mg tablet Your last dose was: Your next dose is: Take 81 mg by mouth daily. 81 mg  
    
   
   
   
  
 MAKAYLA-C 500 mg Tab Generic drug:  ascorbate calcium Your last dose was: Your next dose is: Take 1,000 mg by mouth daily. 1000 mg  
    
   
   
   
  
 folic acid 1 mg tablet Commonly known as:  [employer] Your last dose was: Your next dose is: TAKE ONE TABLET BY MOUTH DAILY  
     
   
   
   
  
 glimepiride 1 mg tablet Commonly known as:  AMARYL Your last dose was: Your next dose is: Take 1 mg by mouth every morning. 1 mg HAIR,SKIN & NAILS PO Your last dose was: Your next dose is: Take 1 Tab by mouth daily. 1 Tab JANUVIA 25 mg tablet Generic drug:  SITagliptin Your last dose was: Your next dose is: Take 50 mg by mouth daily. 50 mg  
    
   
   
   
  
 MAGNESIUM MALATE Your last dose was: Your next dose is: Take 400 mg by mouth daily. 400 mg  
    
   
   
   
  
 OCUVITE PO Your last dose was: Your next dose is: Take 1 Tab by mouth daily. 1 Tab OMEGA 3 FISH OIL PO Your last dose was: Your next dose is: Take 300 mg by mouth daily. 300 mg  
    
   
   
   
  
 VITAMIN D3 PO Your last dose was: Your next dose is: Take 1,000 Units by mouth daily. 1000 Units STOP taking these medications   
 chlorthalidone 50 mg tablet Commonly known as:  HYGROTEN  
   
  
 clopidogrel 75 mg Tab Commonly known as:  PLAVIX FORTEO 20 mcg/dose - 600 mcg/2.4 mL Pnij injection Generic drug:  teriparatide  
   
  
 metFORMIN 1,000 mg tablet Commonly known as:  GLUCOPHAGE potassium chloride SR 10 mEq tablet Commonly known as:  KLOR-CON 10 ASK your doctor about these medications Instructions Each Dose to Equal  
 Morning Noon Evening Bedtime  
 etanercept 50 mg/mL (0.98 mL) injection Commonly known as:  ENBREL Your last dose was: Your next dose is:    
   
   
 1 mL by SubCUTAneous route every seven (7) days. 50 mg  
    
   
   
   
  
 evolocumab pen injection Commonly known as:  [place] Your last dose was: Your next dose is:    
   
   
 1 mL by SubCUTAneous route every fourteen (14) days. 140 mg Where to Get Your Medications Information on where to get these meds will be given to you by the nurse or doctor. ! Ask your nurse or doctor about these medications  
  anastrozole 1 mg tablet  
 carvedilol 6.25 mg tablet  
 docusate sodium 100 mg capsule  
 epoetin celio 10,000 unit/mL injection  
 furosemide 20 mg tablet L. acidoph & paracasei- S therm- Bifido 8 billion cell Cap cap  
 levoFLOXacin 750 mg tablet  
 lisinopril 2.5 mg tablet  
 phosphorus 250 mg tablet  
 polyethylene glycol 17 gram packet  
 spironolactone 25 mg tablet

## 2018-04-16 NOTE — ED NOTES
Dr. Trey Quiñones at bedside. Per Dr. Trey Quiñones patient can remain off BiPAP until after breakfast depending on saturations. Pt is currently 97% RA and denies SOB or pain.

## 2018-04-16 NOTE — ED TRIAGE NOTES
Family reports increased WOB over last few hours. EMS arrived to find patient 71% on room air. NRB only brought patient up to [de-identified]. Rales to apices bilaterally, per EMS. Started her on CPAP, gave Lasix enroute. Pt arrives very comfortable on CPAP. Denies CP.

## 2018-04-16 NOTE — ED NOTES
Patient positioned on bed pan for BM. Patient unable to have BM, incontinence care performed and brief changed. Patient repositioned in bed, call bell within reach. Family stepped out of room and plan to return at 1600.

## 2018-04-16 NOTE — PROGRESS NOTES
Admission Medication Reconciliation:    Information obtained from:  Patient & family (written list), RxQuery/chart review    Comments/Recommendations: Updated PTA meds/reviewed patient's allergies. 1)  Medications added: none    2)  Medications deleted: lecithin 800 mg, cinnamon bark 250 mg, alirocumab 75 mg, amlodipine 5 mg, L-citrulline malate complex 750 mg     3)  Medications changed: levothyroxine 112 mcg changed to Synthroid 88 mcg daily    4)  Of note: Patient gets her Enbrel injection every Friday and her Repatha injection every other Friday (can't remember if last dose was this past Friday or the Friday before that). Recently stopped once weekly Otrexup (methotrexate) 25 mg injections d/t \"liver and kidney issues\", per the pt's family. Significant PMH/Disease States:   Past Medical History:   Diagnosis Date    Anemia 9/2/2009    Colon cancer ([facility] [address] 9/2/2009    surgery/chemo    Colon cancer ([facility] [address] 9/2/2009    DM (diabetes mellitus) ([facility] [address] 9/2/2009    GERD (gastroesophageal reflux disease)     H/O: CVA 9/2/2009    slight l sided weakness    Hypothyroid 9/2/2009    Ill-defined condition     seasonal allergies    Microalbuminuria 9/2/2009    Osteoporosis 9/2/2009    Other and unspecified hyperlipidemia 1/27/2010    Rheumatoid arthritis involving ankle ([facility] [address] 9/28/2016    Rheumatoid arthritis(714.0) 9/2/2009    Unspecified essential hypertension 9/2/2009    Weakness due to cerebrovascular accident        Chief Complaint for this Admission:    Chief Complaint   Patient presents with    Shortness of Breath       Allergies:  Statins-hmg-coa reductase inhibitors and Sulfa (sulfonamide antibiotics)    Prior to Admission Medications:   Prior to Admission Medications   Prescriptions Last Dose Informant Patient Reported? Taking? CHOLECALCIFEROL, VITAMIN D3, (VITAMIN D-3 PO) 4/15/2018 at AM  Yes Yes   Sig: Take 1,000 Units by mouth daily.    FORTEO 20 mcg/dose - 600 mcg/2.4 mL pnij injection 4/15/2018 at AM  No Yes   Sig: USE 1 INJECTION (20 MCG OR 0.08ML) UNDER THE SKIN ONCE DAILY. MAGNESIUM MALATE 4/15/2018 at AM  Yes Yes   Sig: Take 400 mg by mouth daily. MULTIVITAMIN WITH MINERALS (HAIR,SKIN & NAILS PO) 4/15/2018 at AM  Yes Yes   Sig: Take 1 Tab by mouth daily. OMEGA-3 FATTY ACIDS/FISH OIL (OMEGA 3 FISH OIL PO) 4/15/2018 at AM  Yes Yes   Sig: Take 300 mg by mouth daily. VIT C/VIT E/LUTEIN/MIN/OMEGA-3 (OCUVITE PO) 4/15/2018 at AM  Yes Yes   Sig: Take 1 Tab by mouth daily. ascorbate calcium (MAKAYLA-C) 500 mg Tab 4/15/2018 at AM  Yes Yes   Sig: Take 1,000 mg by mouth daily. aspirin delayed-release 81 mg tablet 4/15/2018 at AM  Yes Yes   Sig: Take 81 mg by mouth daily. carvedilol (COREG) 25 mg tablet 4/15/2018 at AM  Yes Yes   Sig: Take 25 mg by mouth two (2) times a day. chlorthalidone (HYGROTEN) 50 mg tablet 4/15/2018 at AM  Yes Yes   Sig: Take 50 mg by mouth daily. clopidogrel (PLAVIX) 75 mg tab 4/15/2018 at AM  No Yes   Sig: Take 1 Tab by mouth daily. etanercept (ENBREL) 50 mg/mL (0.98 mL) injection 2018  No Yes   Si mL by SubCUTAneous route every seven (7) days. evolocumab (REPATHA SURECLICK) pen injection   No Yes   Si mL by SubCUTAneous route every fourteen (14) days. folic acid (FOLVITE) 1 mg tablet 4/15/2018 at AM  No Yes   Sig: TAKE ONE TABLET BY MOUTH DAILY   glimepiride (AMARYL) 1 mg tablet 4/15/2018 at AM  Yes Yes   Sig: Take 1 mg by mouth every morning. levothyroxine (SYNTHROID) 88 mcg tablet 4/15/2018 at AM  Yes Yes   Sig: Take 88 mcg by mouth Daily (before breakfast). metFORMIN (GLUCOPHAGE) 1,000 mg tablet 4/15/2018 at AM  No Yes   Sig: Take 1 Tab by mouth two (2) times daily (with meals). potassium chloride SR (KLOR-CON 10) 10 mEq tablet 4/15/2018 at AM  Yes Yes   Sig: Take 20 mEq by mouth daily. sitaGLIPtin (JANUVIA) 25 mg tablet 4/15/2018 at AM  Yes Yes   Sig: Take 50 mg by mouth daily.       Facility-Administered Medications: None Thank you for allowing me to participate in the care of this patient. Please contact the medication reconciliation pharmacist () with any questions.       Gabino Lozoya, PharmD Candidate [id number]

## 2018-04-16 NOTE — ED NOTES
Pt reports feeling nauseas, pt given medication, see MAR. Pt repositioned in bed, positive flatulence, pt states, \"I feel better. \"

## 2018-04-16 NOTE — ED NOTES
Verbal shift change report given to Lizabeth Nair (oncoming nurse) by Blake Mckinley RN (offgoing nurse). Report included the following information SBAR, ED Summary and Recent Results. 8:25AM Echo at bedside. 8:36 AM  Spoke to Dr. Home Adames, Cardiology, in regard to recent elevated Troponin. Will repeat EKG and update Dr. Home Adames with changes.

## 2018-04-16 NOTE — ED NOTES
8697  Dr. Monika Nunez at bedside to evaluate pt. Pt placed on hospital CPAP machine by respiratory. Pt is alert and able to respond to questions with brief answers. [de-identified] male family members at bedside able to provide some history. EKG and blood work done. Awaiting chest xray and blood gases. 0550  Pt resting comfortably in stretcher. Able to tolerate off BiPap long enough to take chewable aspirin and get oral temp. Hospitalist in to evaluate pt.     0720  Pt resting comfortably in stretcher. Tolerating BiPap well. No needs expressed. 0750  Critical troponin called by lab. Pages out to Team 5, and Cards. 8794  No return pages regarding critical lab as of yet. Passed lab results on to day shift RNs. Bedside shift change report given to Susy Higgins (oncoming nurse) by Chester Villafana (offgoing nurse). Report included the following information SBAR, ED Summary and Cardiac Rhythm NSR.

## 2018-04-16 NOTE — PROGRESS NOTES
Pt seen with Dr. Taryn Rainey- noted troponin elevation to 3.28.  12 lead EKG repeated and unchanged. No chest pain, SOB improved. Still with bibasilar crackles. Creatinine elevated at 1.36- would prefer improvement in creatinine before taking to cath lab. [facility] scheduled for tomorrow at 2:30 PM with Dr. Taryn Rainey. Will hold metformin for now due to renal function. Pt has hx of statin intolerant and LFTs elevated. Continue ASA, plavix. Increase diuretic to BID. Hold off on therapeutic lovenox dosing for now. Strict intake and output. Dr. Taryn Rainey will review echo.

## 2018-04-16 NOTE — ED PROVIDER NOTES
HPI Comments: 83yo F with pmh sig for anemia, RA, DM, colon ca, GERD, CVA who p/w sob. History limited by pt's condition. Pt with increasing sob and cough today. Per EMS, O2 sats 70% on room air which improved with CPAP. EMS gave lasix 50mg IV. Patient is a 82 y.o. female presenting with shortness of breath. The history is provided by the patient and a relative. Shortness of Breath   Associated symptoms include cough. Pertinent negatives include no fever, no chest pain, no abdominal pain and no rash. Past Medical History:   Diagnosis Date    Anemia 2009    Colon cancer ([facility] [address] 2009    surgery/chemo    Colon cancer ([facility] [address] 2009    DM (diabetes mellitus) ([facility] [address] 2009    GERD (gastroesophageal reflux disease)     H/O: CVA 2009    slight l sided weakness    Hypothyroid 2009    Ill-defined condition     seasonal allergies    Microalbuminuria 2009    Osteoporosis 2009    Other and unspecified hyperlipidemia 2010    Rheumatoid arthritis involving ankle ([facility] [address] 2016    Rheumatoid arthritis(714.0) 2009    Unspecified essential hypertension 2009    Weakness due to cerebrovascular accident        Past Surgical History:   Procedure Laterality Date    HX COLECTOMY      HX HYSTERECTOMY      HX OTHER SURGICAL      colonoscopies numerous since          Family History:   Problem Relation Age of Onset    Diabetes Mother     Stroke Mother     Diabetes Sister     Other Sister      fell and hit her head -  of this   [place] Pablo Diabetes Brother     Cancer Father      stomach    Diabetes Sister        Social History     Social History    Marital status:      Spouse name: N/A    Number of children: N/A    Years of education: N/A     Occupational History    Not on file.      Social History Main Topics    Smoking status: Never Smoker    Smokeless tobacco: Never Used    Alcohol use No    Drug use: No    Sexual activity: Not Currently     Partners: Male     Other Topics Concern    Not on file     Social History Narrative         ALLERGIES: Statins-hmg-coa reductase inhibitors and Sulfa (sulfonamide antibiotics)    Review of Systems   Constitutional: Negative for fever. HENT: Negative for facial swelling. Eyes: Negative for visual disturbance. Respiratory: Positive for cough and shortness of breath. Negative for chest tightness. Cardiovascular: Negative for chest pain. Gastrointestinal: Negative for abdominal pain. Genitourinary: Negative for dysuria. Musculoskeletal: Negative for arthralgias. Skin: Negative for rash. Neurological: Negative for dizziness. Hematological: Negative for adenopathy. Psychiatric/Behavioral: Negative for suicidal ideas. Vitals:    04/16/18 0411   BP: 175/85   Pulse: (!) 109   Resp: 28   Temp: 97.6 °F (36.4 °C)   SpO2: 91%   Height: 5' 1\" (1.549 m)            Physical Exam   Constitutional: She is oriented to person, place, and time. She appears well-developed and well-nourished. No distress (resp distress). HENT:   Head: Normocephalic and atraumatic. Mouth/Throat: Oropharynx is clear and moist.   Eyes: Pupils are equal, round, and reactive to light. No scleral icterus. Neck: Normal range of motion. Neck supple. No thyromegaly present. Cardiovascular: Regular rhythm, normal heart sounds and intact distal pulses. No murmur heard. tachycardic   Pulmonary/Chest: She is in respiratory distress. Moderate resp distress with roncherus breath sounds. Abdominal: Soft. Bowel sounds are normal. She exhibits no distension. There is no tenderness. Musculoskeletal: Normal range of motion. She exhibits no edema (trace ankle edema). Neurological: She is alert and oriented to person, place, and time. Skin: Skin is warm and dry. No rash noted. She is not diaphoretic. Nursing note and vitals reviewed.        MDM  Number of Diagnoses or Management Options  Acute congestive heart failure, unspecified heart failure type [facility]):   Acute respiratory failure with hypoxia ([address]):   Chronic kidney disease, unspecified CKD stage:   Elevated troponin:   Diagnosis management comments: A:  84yo F with moderate respiratory distress arrives on CPAP via EMS. Decreased breath sounds throughout with continued accessory muscle use so BiPAP initiated after arrival.      DDx:  Pneumonia, bronchitis, effusions, CHF, PE    P:  ecg  cxr  Labs  BiPAP  ABG        ED Course       Procedures    ED EKG interpretation:  Rhythm:  Sinus tach. Rate (approx.): 115. Axis: normal.  ST segment:  No concerning ST elevations or depressions. This EKG was interpreted by Marlo Carlos MD,ED Provider. ABG (on BiPAP): 7.31/43.5/110  WBC=12  BUN/Cr=25/1.36  LFTs mildly elevated  Trop = 0.56  ProBNP:  1668    Portable Chest Xray:  Pulmonary edema. This CXR was interpreted by Marlo Carlos MD, ED Provider. 5:17 AM  Pt doing better on BiPAP. Given Lasix 50mg IV by EMS. Labs and CXR suggestive of CHF. Trop elevated could indicate NSTEMI. ECG without any ischemic changes. I discussed findings and potential admission with pt's sons who expressed that CHF had been a concern for some time. Pt had outpatient echo done last week at [facility] but not gotten results yet. Will plan on admission to hospitalist.  Consult cardiology. 5:19 AM  I have spent **38* minutes of critical care time involved in lab review, consultations with specialist, family decision-making, and documentation. During this entire length of time I was immediately available to the patient and/or family. 5:46 AM  Discussed with Dr. Gabino Orellana who will eval for admission. 6:20 AM  Discussed with Dr. Marialuisa Proctor who will see patient in ED.

## 2018-04-16 NOTE — IP AVS SNAPSHOT
[address] [facility] [address] 
[phone] Patient: Yusra Arnold MRN: [id number] NUT:9[birth year]40[birth year]05 About your hospitalization You were admitted on:  April 16, 2018 You last received care in the:  [address] [place] [address] SURG You were discharged on:  May 11, 2018 Why you were hospitalized Your primary diagnosis was:  Sob (Shortness Of Breath) Your diagnoses also included: Altered Mental Status, Unspecified, Acute Systolic Heart Failure (Hcc) Follow-up Information Follow up With Details Comments Contact Info 914 [facility], [address]  inpatient rehab [address] [place] Squires [address] 
[phone] Livan Garcia MD In 1 week PCP will contact patient to schedule f/u appointment. [address] [address] [place] [address] 
[phone] Cass Ford MD On 5/25/2018 Hospital f/u appointment Friday, 5/25/18 @ 1:00 p.m.  [address] [facility] 
[phone] Devin Nash MD In 3 weeks  818 [facility] [address] [address] 
[phone] Gertrude Lubin MD In 3 weeks for RA infusions  [address] [place] [address] 
[phone] Your Scheduled Appointments Friday May 25, 2018  1:00 PM EDT  
ESTABLISHED PATIENT with Csas Ford MD  
[facility] ([facility]) [address] [place] [address] [address] [facility]  
[phone] Discharge Orders None A check stella indicates which time of day the medication should be taken. My Medications START taking these medications Instructions Each Dose to Equal  
 Morning Noon Evening Bedtime  
 anastrozole 1 mg tablet Commonly known as:  ARIMIDEX Start taking on:  5/12/2018 Your last dose was: Your next dose is: Take 1 Tab by mouth daily. 1 mg docusate sodium 100 mg capsule Commonly known as:  Fridasomdanielito Wilson Your last dose was: Your next dose is: Take 1 Cap by mouth two (2) times a day for 90 days. 100 mg  
    
   
   
   
  
 epoetin celio 10,000 unit/mL injection Commonly known as:  EPOGEN;PROCRIT Your last dose was: Your next dose is:    
   
   
 1 mL by SubCUTAneous route every Monday, Wednesday, Friday. 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  
 [address]00 Units  
    
   
   
   
  
 furosemide 20 mg tablet Commonly known as:  LASIX Start taking on:  5/12/2018 Your last dose was: Your next dose is: Take one pill daily by mouth . L. acidoph & paracasei- S therm- Bifido 8 billion cell Cap cap Commonly known as:  BERYL-Q/RISAQUAD Start taking on:  5/12/2018 Your last dose was: Your next dose is: Take 1 Cap by mouth daily. 1 Cap  
    
   
   
   
  
 levoFLOXacin 750 mg tablet Commonly known as:  Consuelo Maxwell Your last dose was: Your next dose is: Take 1 Tab by mouth every fourty-eight (48) hours for 5 days. 750 mg  
    
   
   
   
  
 lisinopril 2.5 mg tablet Commonly known as:  Lisa Golder Start taking on:  5/12/2018 Your last dose was: Your next dose is: Take 1 Tab by mouth daily. 2.5 mg  
    
   
   
   
  
 phosphorus 250 mg tablet Commonly known as:  K PHOS NEUTRAL Your last dose was: Your next dose is: Take 1 Tab by mouth two (2) times a day. 1 Tab  
    
   
   
   
  
 polyethylene glycol 17 gram packet Commonly known as:  Jessica Plane Start taking on:  5/12/2018 Your last dose was: Your next dose is: Take 1 Packet by mouth daily. 17 g  
    
   
   
   
  
 spironolactone 25 mg tablet Commonly known as:  ALDACTONE Start taking on:  5/12/2018 Your last dose was: Your next dose is: Take 1 Tab by mouth daily. 25 mg CHANGE how you take these medications Instructions Each Dose to Equal  
 Morning Noon Evening Bedtime  
 carvedilol 6.25 mg tablet Commonly known as:  Marybel Michelle What changed:   
- medication strength 
- how much to take - when to take this Your last dose was: Your next dose is: Take 1 Tab by mouth two (2) times daily (with meals). 6.25 mg SYNTHROID 88 mcg tablet Generic drug:  levothyroxine What changed:  Another medication with the same name was removed. Continue taking this medication, and follow the directions you see here. Your last dose was: Your next dose is: Take 88 mcg by mouth Daily (before breakfast). 88 mcg CONTINUE taking these medications Instructions Each Dose to Equal  
 Morning Noon Evening Bedtime  
 aspirin delayed-release 81 mg tablet Your last dose was: Your next dose is: Take 81 mg by mouth daily. 81 mg  
    
   
   
   
  
 MAKAYLA-C 500 mg Tab Generic drug:  ascorbate calcium Your last dose was: Your next dose is: Take 1,000 mg by mouth daily. 1000 mg  
    
   
   
   
  
 folic acid 1 mg tablet Commonly known as:  Google Your last dose was: Your next dose is: TAKE ONE TABLET BY MOUTH DAILY  
     
   
   
   
  
 glimepiride 1 mg tablet Commonly known as:  AMARYL Your last dose was: Your next dose is: Take 1 mg by mouth every morning. 1 mg HAIR,SKIN & NAILS PO Your last dose was: Your next dose is: Take 1 Tab by mouth daily. 1 Tab JANUVIA 25 mg tablet Generic drug:  SITagliptin Your last dose was: Your next dose is: Take 50 mg by mouth daily. 50 mg MAGNESIUM MALATE Your last dose was: Your next dose is: Take 400 mg by mouth daily. 400 mg  
    
   
   
   
  
 OCUVITE PO Your last dose was: Your next dose is: Take 1 Tab by mouth daily. 1 Tab OMEGA 3 FISH OIL PO Your last dose was: Your next dose is: Take 300 mg by mouth daily. 300 mg  
    
   
   
   
  
 VITAMIN D3 PO Your last dose was: Your next dose is: Take 1,000 Units by mouth daily. 1000 Units STOP taking these medications   
 chlorthalidone 50 mg tablet Commonly known as:  HYGROTEN  
   
  
 clopidogrel 75 mg Tab Commonly known as:  PLAVIX FORTEO 20 mcg/dose - 600 mcg/2.4 mL Pnij injection Generic drug:  teriparatide  
   
  
 metFORMIN 1,000 mg tablet Commonly known as:  GLUCOPHAGE potassium chloride SR 10 mEq tablet Commonly known as:  KLOR-CON 10 ASK your doctor about these medications Instructions Each Dose to Equal  
 Morning Noon Evening Bedtime  
 etanercept 50 mg/mL (0.98 mL) injection Commonly known as:  ENBREL Your last dose was: Your next dose is:    
   
   
 1 mL by SubCUTAneous route every seven (7) days. 50 mg  
    
   
   
   
  
 evolocumab pen injection Commonly known as:  Rich Page Your last dose was: Your next dose is:    
   
   
 1 mL by SubCUTAneous route every fourteen (14) days. 140 mg Where to Get Your Medications Information on where to get these meds will be given to you by the nurse or doctor. ! Ask your nurse or doctor about these medications  
  anastrozole 1 mg tablet  
 carvedilol 6.25 mg tablet  
 docusate sodium 100 mg capsule  
 epoetin celio 10,000 unit/mL injection  
 furosemide 20 mg tablet L. acidoph & paracasei- S therm- Bifido 8 billion cell Cap cap  
 levoFLOXacin 750 mg tablet  
 lisinopril 2.5 mg tablet  
 phosphorus 250 mg tablet  
 polyethylene glycol 17 gram packet  
 spironolactone 25 mg tablet Discharge Instructions None [place] Announcement We are excited to announce that we are making your provider's discharge notes available to you in [employer]. You will see these notes when they are completed and signed by the physician that discharged you from your recent hospital stay. If you have any questions or concerns about any information you see in [employer], please call the Health Information Department where you were seen or reach out to your Primary Care Provider for more information about your plan of care. Introducing [facility]! [facility] introduces [employer] patient portal. Now you can access parts of your medical record, email your doctor's office, and request medication refills online. 1. In your internet browser, go to [email or web address] 2. Click on the First Time User? Click Here link in the Sign In box. You will see the New Member Sign Up page. 3. Enter your [employer] Access Code exactly as it appears below. You will not need to use this code after youve completed the sign-up process. If you do not sign up before the expiration date, you must request a new code. · [employer] Access Code: [email or web address] Expires: 8/9/2018 10:02 AM 
 
4. Enter the last four digits of your Social Security Number (xxxx) and Date of Birth (mm/dd/yyyy) as indicated and click Submit. You will be taken to the next sign-up page. 5. Create a SocialGlimpzt ID. This will be your [employer] login ID and cannot be changed, so think of one that is secure and easy to remember. 6. Create a [employer] password. You can change your password at any time. 7. Enter your Password Reset Question and Answer.  This can be used at a later time if you forget your password. 8. Enter your e-mail address. You will receive e-mail notification when new information is available in [address] 9. Click Sign Up. You can now view and download portions of your medical record. 10. Click the Download Summary menu link to download a portable copy of your medical information. If you have questions, please visit the Frequently Asked Questions section of the [employer] website. Remember, [employer] is NOT to be used for urgent needs. For medical emergencies, dial 911. Now available from your iPhone and Android! Introducing Eliot Vazquez As a Mobile [facility] [employer] patient, I wanted to make you aware of our electronic visit tool called Eliot Vazquez. [employer] allows you to connect within minutes with a medical provider 24 hours a day, seven days a week via a mobile device or tablet or logging into a secure website from your computer. You can access Eliot Vazquez from anywhere in the [place]. A virtual visit might be right for you when you have a simple condition and feel like you just dont want to get out of bed, or cant get away from work for an appointment, when your regular Mobile [facility] provider is not available (evenings, weekends or holidays), or when youre out of town and need minor care. Electronic visits cost only $49 and if the [employer] provider determines a prescription is needed to treat your condition, one can be electronically transmitted to a nearby pharmacy*. Please take a moment to enroll today if you have not already done so. The enrollment process is free and takes just a few minutes. To enroll, please download the [employer] teresita to your tablet or phone, or visit [email or web address] to enroll on your computer.    
And, as an [address] [place] [address] patient with a [employer] account, the results of your visits will be scanned into your electronic medical record and your primary care provider will be able to view the scanned results. We urge you to continue to see your regular [facility] provider for your ongoing medical care. And while your primary care provider may not be the one available when you seek a Spotwise virtual visit, the peace of mind you get from getting a real diagnosis real time can be priceless. For more information on Spotwise, view our Frequently Asked Questions (FAQs) at [email or web address]. Sincerely, 
 
Jovon Kelsey MD 
Chief Medical Officer Laury Sellers *:  certain medications cannot be prescribed via Spotwise Unresulted Labs-Please follow up with your PCP about these lab tests Order Current Status CULTURE, FUNGUS Preliminary result Providers Seen During Your Hospitalization Provider Specialty Primary office phone Curtis Kirby MD Emergency Medicine [phone] Nic Kirkland MD Hospitalist [phone] Ish Olson MD Internal Medicine [phone] Autumn Jara MD Hospitalist [phone] Kalpana Alcantara MD Internal Medicine [phone] Wily Joe MD Internal Medicine [phone] [address] , MD Internal Medicine [phone] Tyler Garcia MD Internal Medicine [phone] Shelby Quinonez MD Internal Medicine [phone] Parviz Whalen MD Internal Medicine [phone] Your Primary Care Physician (PCP) Primary Care Physician Office Phone Office Fax Ilya Kathleen [phone] You are allergic to the following Allergen Reactions Statins-Hmg-Coa Reductase Inhibitors Other (comments) Intolerant to statins Sulfa (Sulfonamide Antibiotics) Other (comments)  
 syncope Recent Documentation Height Weight BMI OB Status Smoking Status 1.549 m 61.7 kg 25.7 kg/m2 Hysterectomy Never Smoker Emergency Contacts Name Discharge Info Relation Home Work Mobile [address] CAREGIVER [3] Son [22] [phone] Patricia Blanca  Child [2] [phone] Patient Belongings The following personal items are in your possession at time of discharge: 
  Dental Appliances: None  Visual Aid: None      Home Medications: None   Jewelry: None  Clothing: None    Other Valuables: None Discharge Instructions Attachments/References HEART FAILURE: AVOIDING TRIGGERS (ENGLISH) Patient Handouts Avoiding Triggers With Heart Failure: Care Instructions Your Care Instructions Triggers are anything that make your heart failure flare up. A flare-up is also called \"sudden heart failure\" or \"acute heart failure. \" When you have a flare-up, fluid builds up in your lungs, and you have problems breathing. You might need to go to the hospital. By watching for changes in your condition and avoiding triggers, you can prevent heart failure flare-ups. Follow-up care is a key part of your treatment and safety. Be sure to make and go to all appointments, and call your doctor if you are having problems. It's also a good idea to know your test results and keep a list of the medicines you take. How can you care for yourself at home? Watch for changes in your weight and condition · Weigh yourself without clothing at the same time each day. Record your weight. Call your doctor if you have sudden weight gain, such as more than 2 to 3 pounds in a day or 5 pounds in a week. (Your doctor may suggest a different range of weight gain.) A sudden weight gain may mean that your heart failure is getting worse. · Keep a daily record of your symptoms. Write down any changes in how you feel, such as new shortness of breath, cough, or problems eating. Also record if your ankles are more swollen than usual and if you feel more tired than usual. Note anything that you ate or did that could have triggered these changes. Limit sodium Sodium causes your body to hold on to extra water. This may cause your heart failure symptoms to get worse. People get most of their sodium from processed foods. Fast food and restaurant meals also tend to be very high in sodium. · Your doctor may suggest that you limit sodium to 2,000 milligrams (mg) a day or less. That is less than 1 teaspoon of salt a day, including all the salt you eat in cooking or in packaged foods. · Read food labels on cans and food packages. They tell you how much sodium you get in one serving. Check the serving size. If you eat more than one serving, you are getting more sodium. · Be aware that sodium can come in forms other than salt, including monosodium glutamate (MSG), sodium citrate, and sodium bicarbonate (baking soda). MSG is often added to Asian food. You can sometimes ask for food without MSG or salt. · Slowly reducing salt will help you adjust to the taste. Take the salt shaker off the table. · Flavor your food with garlic, lemon juice, onion, vinegar, herbs, and spices instead of salt. Do not use soy sauce, steak sauce, onion salt, garlic salt, mustard, or ketchup on your food, unless it is labeled \"low-sodium\" or \"low-salt. \" 
· Make your own salad dressings, sauces, and ketchup without adding salt. · Use fresh or frozen ingredients, instead of canned ones, whenever you can. Choose low-sodium canned goods. · Eat less processed food and food from restaurants, including fast food. Exercise as directed Moderate, regular exercise is very good for your heart. It improves your blood flow and helps control your weight. But too much exercise can stress your heart and cause a heart failure flare-up. · Check with your doctor before you start an exercise program. 
· Walking is an easy way to get exercise. Start out slowly. Gradually increase the length and pace of your walk. Swimming, riding a bike, and using a treadmill are also good forms of exercise. · When you exercise, watch for signs that your heart is working too hard. You are pushing yourself too hard if you cannot talk while you are exercising. If you become short of breath or dizzy or have chest pain, stop, sit down, and rest. 
· Do not exercise when you do not feel well. Take medicines correctly · Take your medicines exactly as prescribed. Call your doctor if you think you are having a problem with your medicine. · Make a list of all the medicines you take. Include those prescribed to you by other doctors and any over-the-counter medicines, vitamins, or supplements you take. Take this list with you when you go to any doctor. · Take your medicines at the same time every day. It may help you to post a list of all the medicines you take every day and what time of day you take them. · Make taking your medicine as simple as you can. Plan times to take your medicines when you are doing other things, such as eating a meal or getting ready for bed. This will make it easier to remember to take your medicines. · Get organized. Use helpful tools, such as daily or weekly pill containers. When should you call for help? Call 911 if you have symptoms of sudden heart failure such as: 
? · You have severe trouble breathing. ? · You cough up pink, foamy mucus. ? · You have a new irregular or rapid heartbeat. ?Call your doctor now or seek immediate medical care if: 
? · You have new or increased shortness of breath. ? · You are dizzy or lightheaded, or you feel like you may faint. ? · You have sudden weight gain, such as more than 2 to 3 pounds in a day or 5 pounds in a week. (Your doctor may suggest a different range of weight gain.) ? · You have increased swelling in your legs, ankles, or feet. ? · You are suddenly so tired or weak that you cannot do your usual activities. ? Watch closely for changes in your health, and be sure to contact your doctor if you develop new symptoms. Where can you learn more? Go to http://david-noni.info/. Enter B350 in the search box to learn more about \"Avoiding Triggers With Heart Failure: Care Instructions. \" Current as of: September 21, 2016 Content Version: 11.4 © 9625-4923 Healthwise, Incorporated. Care instructions adapted under license by [employer] (which disclaims liability or warranty for this information). If you have questions about a medical condition or this instruction, always ask your healthcare professional. [address] any warranty or liability for your use of this information. Please provide this summary of care documentation to your next provider. Signatures-by signing, you are acknowledging that this After Visit Summary has been reviewed with you and you have received a copy. Patient Signature:  ____________________________________________________________ Date:  ____________________________________________________________  
  
Homa Eldridge Provider Signature:  ____________________________________________________________ Date:  ____________________________________________________________

## 2018-04-16 NOTE — ED NOTES
Patient's bedding and gown changed, incontinence care and manda care performed. Purewick replaced. Patient denies further needs at this time. Caregiver at bedside.

## 2018-04-16 NOTE — CONSULTS
CARDIOLOGY Consultation Note     Subjective:      Sumit Diaz is a 82 y.o. patient who is seen for evaluation of  CHF  She and her son said she was not feeling well last night after dinner  Nothing was specific but she felt that she needed tums  She did not say chest pain or arm pain  Then she felt short of breaths and son noted she was gasping for air so called 911  Here chest xray confirmed bilateral pulmonary edema and BNP elevation  She was placed on BIPAP and diuresed and felt better  She has urinated  She has troponin 0.56  ECG sinus tachycardia with poor R wave progression  She had seen Dr Camila Lara in 12/2016  She has many CAD risk factors as below including previous stroke and left carotid stenosis      Patient Active Problem List   Diagnosis Code    DM (diabetes mellitus) ([facility] E11.9    Hypothyroid E03.9    Colon cancer ([facility] C18.9    H/O: CVA     Microalbuminuria R80.9    Age-related osteoporosis without current pathological fracture M81.0    Essential hypertension I10    Anemia D64.9    Hyperlipidemia E78.5    Carotid bruit R09.89    Claudication ([facility] I73.9    Weakness of left arm R29.898    PAD (peripheral artery disease) ([facility] I73.9    Weakness due to cerebrovascular accident [id number]    Neuropathy in diabetes ([facility] E11.40    Occlusion and stenosis of carotid artery without mention of cerebral infarction I65.29    Seropositive rheumatoid arthritis of multiple sites ([facility] M05.79    Primary osteoarthritis of both knees M17.0    Long-term use of immunosuppressant medication Z79.899    Statin intolerance Z78.9    Asymptomatic hyperuricemia E79.0    Vitamin D deficiency E55.9    CKD (chronic kidney disease) stage 3, GFR 30-59 ml/min N18.3    SOB (shortness of breath) R06.02     Current Outpatient Prescriptions   Medication Sig Dispense Refill    glimepiride (AMARYL) 1 mg tablet Take 1 mg by mouth every morning.       etanercept (ENBREL) 50 mg/mL (0.98 mL) injection 1 mL by SubCUTAneous route every seven (7) days. 4 Syringe 6    FORTEO 20 mcg/dose - 600 mcg/2.4 mL pnij injection USE 1 INJECTION (20 MCG OR 0.08ML) UNDER THE SKIN ONCE DAILY. 2.4 mL 0    clopidogrel (PLAVIX) 75 mg tab Take 1 Tab by mouth daily. 90 Tab 0    evolocumab (REPATHA SURECLICK) pen injection 1 mL by SubCUTAneous route every fourteen (14) days. 2 Pen 11    folic acid (FOLVITE) 1 mg tablet TAKE ONE TABLET BY MOUTH DAILY 90 Tab 2    carvedilol (COREG) 25 mg tablet Take 25 mg by mouth two (2) times a day.  potassium chloride SR (KLOR-CON 10) 10 mEq tablet Take 20 mEq by mouth daily.  levothyroxine (SYNTHROID) 112 mcg tablet Take 112 mcg by mouth Daily (before breakfast).  chlorthalidone (HYGROTEN) 50 mg tablet Take 50 mg by mouth daily.  sitaGLIPtin (JANUVIA) 25 mg tablet Take 50 mg by mouth daily.  aspirin delayed-release 81 mg tablet Take 81 mg by mouth daily.  metFORMIN (GLUCOPHAGE) 1,000 mg tablet Take 1 Tab by mouth two (2) times daily (with meals). 180 Tab 1    alirocumab (PRALUENT PEN) 75 mg/mL injector pen 1 mL by SubCUTAneous route Once every 2 weeks. 2 Syringe 11    BD INSULIN PEN NEEDLE UF MINI 31 gauge x 3/16\" ndle USE AS DIRECTED WITH FORTEO PEN 12 Pen Needle 3    amLODIPine (NORVASC) 5 mg tablet Take 5 mg by mouth daily.  MONOJECT TB SAFETY SYRINGE 1 mL 25 gauge x 5/8\" syrg Use 1 syringe with methotrexate as directed. 12 Syringe 3    CINNAMON BARK (CINNAMON PO) Take 250 mg by mouth daily.  OTHER L-citrulline malate complex 750mg. Takes one po daily.  VIT C/VIT E/LUTEIN/MIN/OMEGA-3 (OCUVITE PO) Take  by mouth. Takes one po daily.  MAGNESIUM MALATE Take 200 mg by mouth daily.  MULTIVITAMIN WITH MINERALS (HAIR,SKIN & NAILS PO) Take  by mouth. Takes one po daily.  LECITHIN PO Take 800 mg by mouth two (2) times a day.  OMEGA-3 FATTY ACIDS/FISH OIL (OMEGA 3 FISH OIL PO) Take 300 mg by mouth daily.       ascorbate calcium (MAKAYLA-C) 500 mg Tab Take 1,000 mg by mouth daily.  CHOLECALCIFEROL, VITAMIN D3, (VITAMIN D-3 PO) Take 1,000 Units by mouth daily. Allergies   Allergen Reactions    Statins-Hmg-Coa Reductase Inhibitors Other (comments)     Intolerant to statins     Sulfa (Sulfonamide Antibiotics) Other (comments)     syncope     Past Medical History:   Diagnosis Date    Anemia 2009    Colon cancer [facility] 2009    surgery/chemo    Colon cancer ([facility] 2009    DM [facility]) [facility] 2009    GERD (gastroesophageal reflux disease)     H/O: CVA 2009    slight l sided weakness    Hypothyroid 2009    Ill-defined condition     seasonal allergies    Microalbuminuria 2009    Osteoporosis 2009    Other and unspecified hyperlipidemia 2010    Rheumatoid arthritis involving ankle ([facility] 2016    Rheumatoid arthritis(714.0) 2009    Unspecified essential hypertension 2009    Weakness due to cerebrovascular accident      Past Surgical History:   Procedure Laterality Date    HX COLECTOMY      HX HYSTERECTOMY      HX OTHER SURGICAL      colonoscopies numerous since      Family History   Problem Relation Age of Onset    Diabetes Mother     Stroke Mother     Diabetes Sister     Other Sister      fell and hit her head -  of this   [employer] Diabetes Brother     Cancer Father      stomach    Diabetes Sister      Social History   Substance Use Topics    Smoking status: Never Smoker    Smokeless tobacco: Never Used    Alcohol use No        Review of Systems:   Constitutional: Negative for fever, chills, weight loss, + malaise/fatigue. HEENT: Negative for nosebleeds, vision changes. Respiratory: Negative for cough, hemoptysis  Cardiovascular: Negative for chest pain, palpitations, orthopnea, claudication, leg swelling, syncope, and PND. + orthopnea  Gastrointestinal: Negative for nausea, vomiting, diarrhea, blood in stool and melena.    Genitourinary: Negative for dysuria, and hematuria. Musculoskeletal: Negative for myalgias, arthralgia. Skin: Negative for rash. Heme: Does not bleed or bruise easily. Neurological: Negative for speech change and focal weakness     Objective:     Visit Vitals    /77    Pulse (!) 110    Temp 98 °F (36.7 °C)    Resp 23    Ht 5' 1\" (1.549 m)    SpO2 100%      Physical Exam:   Constitutional: well-developed and well-nourished. No respiratory distress. Head: Normocephalic and atraumatic. Eyes: Pupils are equal, round  ENT: BIPAP on  Neck: supple. No JVD present. Cardiovascular: fast rate, regular rhythm. Exam reveals no gallop and no friction rub. No murmur heard. Pulmonary/Chest: crackles No wheezes. Abdominal: Soft, no tenderness. Musculoskeletal: no edema. Neurological: alert,oriented. Skin: Skin is warm and dry  Psychiatric: normal mood and affect. Behavior is normal. Judgment and thought content normal.           Assessment/Plan:   NSTEMI  Pulmonary edema  Hypertension  DM2  PVD  Hx of stroke    Agree with aspirin and she has been on plavix. Add lovenox if chest pain or ECG changes with more ischemia  Coreg  Diuresis and BIPAP until dyspnea and pulmonary edema resolved  Cannot tolerate statin  Will ask Dr Sergio Cabrera to assume care and do cardiac cath when pulmonary edema resolved and she is able to lie flat with good O2 sat  Her son agrees       Thank you for involving me in this patient's care and please call with further concerns or questions. Qi Mckeon M.D.   Electrophysiology/Cardiology  [facility]  [address] , [place] Omer [address] [place] [address]  [phone]

## 2018-04-17 ENCOUNTER — APPOINTMENT (OUTPATIENT)
Dept: ULTRASOUND IMAGING | Age: 83
DRG: 853 | End: 2018-04-17
Attending: INTERNAL MEDICINE
Payer: MEDICARE

## 2018-04-17 LAB
ALBUMIN SERPL-MCNC: 2.5 G/DL (ref 3.5–5)
ALBUMIN/GLOB SERPL: 0.5 {RATIO} (ref 1.1–2.2)
ALP SERPL-CCNC: 398 U/L (ref 45–117)
ALT SERPL-CCNC: 83 U/L (ref 12–78)
ANION GAP SERPL CALC-SCNC: 10 MMOL/L (ref 5–15)
AST SERPL-CCNC: 102 U/L (ref 15–37)
ATRIAL RATE: 83 BPM
BILIRUB DIRECT SERPL-MCNC: <0.1 MG/DL (ref 0–0.2)
BILIRUB SERPL-MCNC: 0.5 MG/DL (ref 0.2–1)
BUN SERPL-MCNC: 31 MG/DL (ref 6–20)
BUN/CREAT SERPL: 19 (ref 12–20)
CALCIUM SERPL-MCNC: 9.3 MG/DL (ref 8.5–10.1)
CALCULATED P AXIS, ECG09: 48 DEGREES
CALCULATED R AXIS, ECG10: 67 DEGREES
CALCULATED T AXIS, ECG11: 128 DEGREES
CHLORIDE SERPL-SCNC: 97 MMOL/L (ref 97–108)
CK MB CFR SERPL CALC: 7.6 % (ref 0–2.5)
CK MB SERPL-MCNC: 10.8 NG/ML (ref 5–25)
CK SERPL-CCNC: 142 U/L (ref 26–192)
CO2 SERPL-SCNC: 23 MMOL/L (ref 21–32)
CREAT SERPL-MCNC: 1.61 MG/DL (ref 0.55–1.02)
DIAGNOSIS, 93000: NORMAL
ERYTHROCYTE [DISTWIDTH] IN BLOOD BY AUTOMATED COUNT: 23 % (ref 11.5–14.5)
GLOBULIN SER CALC-MCNC: 5.4 G/DL (ref 2–4)
GLUCOSE BLD STRIP.AUTO-MCNC: 170 MG/DL (ref 65–100)
GLUCOSE BLD STRIP.AUTO-MCNC: 200 MG/DL (ref 65–100)
GLUCOSE BLD STRIP.AUTO-MCNC: 203 MG/DL (ref 65–100)
GLUCOSE SERPL-MCNC: 192 MG/DL (ref 65–100)
HCT VFR BLD AUTO: 30.7 % (ref 35–47)
HGB BLD-MCNC: 9.7 G/DL (ref 11.5–16)
MCH RBC QN AUTO: 28.8 PG (ref 26–34)
MCHC RBC AUTO-ENTMCNC: 31.6 G/DL (ref 30–36.5)
MCV RBC AUTO: 91.1 FL (ref 80–99)
NRBC # BLD: 0 K/UL (ref 0–0.01)
NRBC BLD-RTO: 0 PER 100 WBC
P-R INTERVAL, ECG05: 152 MS
PLATELET # BLD AUTO: 478 K/UL (ref 150–400)
PMV BLD AUTO: 9.5 FL (ref 8.9–12.9)
POTASSIUM SERPL-SCNC: 4.9 MMOL/L (ref 3.5–5.1)
PROT SERPL-MCNC: 7.9 G/DL (ref 6.4–8.2)
Q-T INTERVAL, ECG07: 428 MS
QRS DURATION, ECG06: 86 MS
QTC CALCULATION (BEZET), ECG08: 502 MS
RBC # BLD AUTO: 3.37 M/UL (ref 3.8–5.2)
SERVICE CMNT-IMP: ABNORMAL
SODIUM SERPL-SCNC: 130 MMOL/L (ref 136–145)
TROPONIN I SERPL-MCNC: 18.9 NG/ML
VENTRICULAR RATE, ECG03: 83 BPM
WBC # BLD AUTO: 9.5 K/UL (ref 3.6–11)

## 2018-04-17 PROCEDURE — 65660000001 HC RM ICU INTERMED STEPDOWN

## 2018-04-17 PROCEDURE — 93005 ELECTROCARDIOGRAM TRACING: CPT

## 2018-04-17 PROCEDURE — 84484 ASSAY OF TROPONIN QUANT: CPT | Performed by: HOSPITALIST

## 2018-04-17 PROCEDURE — 36415 COLL VENOUS BLD VENIPUNCTURE: CPT | Performed by: INTERNAL MEDICINE

## 2018-04-17 PROCEDURE — 82550 ASSAY OF CK (CPK): CPT | Performed by: HOSPITALIST

## 2018-04-17 PROCEDURE — 80048 BASIC METABOLIC PNL TOTAL CA: CPT | Performed by: INTERNAL MEDICINE

## 2018-04-17 PROCEDURE — 82962 GLUCOSE BLOOD TEST: CPT

## 2018-04-17 PROCEDURE — 74011250637 HC RX REV CODE- 250/637: Performed by: INTERNAL MEDICINE

## 2018-04-17 PROCEDURE — 80076 HEPATIC FUNCTION PANEL: CPT | Performed by: INTERNAL MEDICINE

## 2018-04-17 PROCEDURE — 74011250637 HC RX REV CODE- 250/637: Performed by: EMERGENCY MEDICINE

## 2018-04-17 PROCEDURE — 85027 COMPLETE CBC AUTOMATED: CPT | Performed by: INTERNAL MEDICINE

## 2018-04-17 PROCEDURE — 76770 US EXAM ABDO BACK WALL COMP: CPT

## 2018-04-17 PROCEDURE — 74011250636 HC RX REV CODE- 250/636: Performed by: INTERNAL MEDICINE

## 2018-04-17 PROCEDURE — 74011250636 HC RX REV CODE- 250/636: Performed by: NURSE PRACTITIONER

## 2018-04-17 PROCEDURE — 74011250637 HC RX REV CODE- 250/637: Performed by: HOSPITALIST

## 2018-04-17 RX ADMIN — Medication 10 ML: at 07:40

## 2018-04-17 RX ADMIN — FUROSEMIDE 40 MG: 10 INJECTION, SOLUTION INTRAMUSCULAR; INTRAVENOUS at 19:16

## 2018-04-17 RX ADMIN — ENOXAPARIN SODIUM 30 MG: 30 INJECTION SUBCUTANEOUS at 09:48

## 2018-04-17 RX ADMIN — VITAMIN D, TAB 1000IU (100/BT) 1000 UNITS: 25 TAB at 09:48

## 2018-04-17 RX ADMIN — CLOPIDOGREL BISULFATE 75 MG: 75 TABLET ORAL at 09:48

## 2018-04-17 RX ADMIN — Medication 10 ML: at 20:23

## 2018-04-17 RX ADMIN — POTASSIUM CHLORIDE 20 MEQ: 750 TABLET, FILM COATED, EXTENDED RELEASE ORAL at 09:48

## 2018-04-17 RX ADMIN — THERA TABS 1 TABLET: TAB at 09:49

## 2018-04-17 RX ADMIN — GLIMEPIRIDE 1 MG: 1 TABLET ORAL at 07:39

## 2018-04-17 RX ADMIN — Medication 400 MG: at 09:00

## 2018-04-17 RX ADMIN — CARVEDILOL 25 MG: 12.5 TABLET, FILM COATED ORAL at 19:21

## 2018-04-17 RX ADMIN — LEVOTHYROXINE SODIUM 88 MCG: 88 TABLET ORAL at 07:39

## 2018-04-17 RX ADMIN — SITAGLIPTIN 50 MG: 25 TABLET, FILM COATED ORAL at 09:48

## 2018-04-17 RX ADMIN — Medication 1 CAPSULE: at 09:49

## 2018-04-17 RX ADMIN — OXYCODONE HYDROCHLORIDE AND ACETAMINOPHEN 500 MG: 500 TABLET ORAL at 09:49

## 2018-04-17 RX ADMIN — FOLIC ACID 1 MG: 1 TABLET ORAL at 09:48

## 2018-04-17 RX ADMIN — FUROSEMIDE 40 MG: 10 INJECTION, SOLUTION INTRAMUSCULAR; INTRAVENOUS at 09:48

## 2018-04-17 RX ADMIN — ASPIRIN 81 MG: 81 TABLET, COATED ORAL at 09:49

## 2018-04-17 RX ADMIN — CARVEDILOL 25 MG: 12.5 TABLET, FILM COATED ORAL at 09:48

## 2018-04-17 NOTE — ED NOTES
Verbal shift change report given to Terrie Cox and Maria Guadalupe RN (oncoming nurse) by Malika Martin and Perry Orosco RN (offgoing nurse). Report included the following information SBAR, Intake/Output and Cardiac Rhythm NSR.

## 2018-04-17 NOTE — PROGRESS NOTES
D/W Dr. Eunice Tran. Aware of elevated troponin 18.9. Creatinine elevated, trending up. Will postpone C for now due to elevated creatinine. Plan to reschedule for Thursday.

## 2018-04-17 NOTE — PROGRESS NOTES
0115 TRANSFER - IN REPORT:    Verbal report received from [facility] (name) on [facility]  being received from ED (unit) for routine progression of care      Report consisted of patients Situation, Background, Assessment and   Recommendations(SBAR). Information from the following report(s) SBAR, Kardex, Intake/Output, MAR, Recent Results and Cardiac Rhythm NSR was reviewed with the receiving nurse. Opportunity for questions and clarification was provided. Assessment completed upon patients arrival to unit and care assumed. [id number] Primary Nurse Awilda Gallo and Adriana Rojas RN performed a dual skin assessment on this patient-- excoriation in gluteal cleft, hardened area L breast, hardened area L abdomen, scab L ankle. Calderon score is as charted. [id number] Paged Dr. Renea Willis (on call for Dr. Cruzito Pena) to inform him of patient's elevated troponin. Patient is not in any distress. Informed of patient's plan for cardiac cath when respiratory status is stable. No new orders received.

## 2018-04-17 NOTE — CDMP QUERY
There is documentation in the H & P of the patient having worsening chronic kidney disease. Eura Heath Eura Heath Can this be further specified as to the stage of the CKD based on the following reference:    Stage I: GFR > 90  Stage II: GFR 60-89  Stage III: GFR 30-59  Stage IV: GFR 15-29  Stage V: GFR < 15    Please clarify and document your clinical opinion in the progress notes and discharge summary including the definitive and/or presumptive diagnosis, (suspected or probable), related to the above clinical findings. Please include clinical findings supporting your diagnosis.     Thank you,    Bentley Clarke RN, BSN, [facility], [address] [place]  [phone]

## 2018-04-17 NOTE — PROGRESS NOTES
Hospitalist Progress Note            Daily Progress Note: 2018    Assessment/Plan:   1. NSTEMI - troponin trending up; appreciate cardiology help; continue ASA, Plavix and Coreg as per cardiology; ?treatment dose Lovenox, but will defer to cardiology; planning tentatively for left heart cath on , but limited by rising creatinine  2. CHF exacerbation - EF 40% by echo on  (down from 60% by echo in ); continue IV diuretics as per cardiology  3. ARF - worsening with diuresis; follow; have asked for nephrology consultation  4. HTN  5. DM - on Januvia and Amaryl  6. H/o hypothyroidism - continue Synthroid  7. GERD  8. H/o colon CA 25 years ago - according to son, there is a work-up planned with [facility] regarding the possibility of recurrent CA (based on lesions seen in the calvarium on MRI - 3/19)    DISPO: keep on IMCU    ** discussed extensively with the son at the bedside **       Subjective:   1. Feels OK. Seems to be breathing better. Overnight events discussed with nursing. Review of Systems:   No c/o CP. Sleepy. Objective:     Visit Vitals    /56 (BP 1 Location: Right arm, BP Patient Position: At rest)    Pulse 92    Temp 98.2 °F (36.8 °C)    Resp 19    Ht 5' 1\" (1.549 m)    Wt 57.1 kg (125 lb 14.1 oz)  Comment: pt. weighed with standing scale, previous in bed    SpO2 94%    BMI 23.79 kg/m2      O2 Device: Room air    Temp (24hrs), Av °F (36.7 °C), Min:97.8 °F (36.6 °C), Max:98.4 °F (36.9 °C)      701 - 1900  In: -   Out: 600 [Urine:600]    04/15 1901 -  0700  In: 0149 [P.O.:1470]  Out: 1175 [Urine:1175]    EXAM:  General: WD, WN. Alert, cooperative, no acute distress    HEENT: NC, atraumatic. PERRL, EOMI. Anicteric sclerae. Neck:   Supple, trachea midline  Lungs:  Bibasilar crackles. No Wheezing/Rhonchi/Rales.   Heart:  Regular rhythm,  No murmur (), No Rubs, No Gallops  Abdomen: Soft, Non distended, Non tender.  +Bowel sounds, no HSM  Extremities: No c/c/e  Neurologic:  CN 2-12 gi, Alert and oriented X 3. No acute neurological distress   Psych:   Good insight. Not anxious nor agitated. Data Review:     Recent Results (from the past 24 hour(s))   METABOLIC PANEL, BASIC    Collection Time: 04/17/18  2:40 AM   Result Value Ref Range    Sodium 130 (L) 136 - 145 mmol/L    Potassium 4.9 3.5 - 5.1 mmol/L    Chloride 97 97 - 108 mmol/L    CO2 23 21 - 32 mmol/L    Anion gap 10 5 - 15 mmol/L    Glucose 192 (H) 65 - 100 mg/dL    BUN 31 (H) 6 - 20 MG/DL    Creatinine 1.61 (H) 0.55 - 1.02 MG/DL    BUN/Creatinine ratio 19 12 - 20      GFR est AA 37 (L) >60 ml/min/1.73m2    GFR est non-AA 31 (L) >60 ml/min/1.73m2    Calcium 9.3 8.5 - 10.1 MG/DL   CBC W/O DIFF    Collection Time: 04/17/18  2:40 AM   Result Value Ref Range    WBC 9.5 3.6 - 11.0 K/uL    RBC 3.37 (L) 3.80 - 5.20 M/uL    HGB 9.7 (L) 11.5 - 16.0 g/dL    HCT 30.7 (L) 35.0 - 47.0 %    MCV 91.1 80.0 - 99.0 FL    MCH 28.8 26.0 - 34.0 PG    MCHC 31.6 30.0 - 36.5 g/dL    RDW 23.0 (H) 11.5 - 14.5 %    PLATELET 249 (H) 120 - 400 K/uL    MPV 9.5 8.9 - 12.9 FL    NRBC 0.0 0  WBC    ABSOLUTE NRBC 0.00 0.00 - 0.01 K/uL   HEPATIC FUNCTION PANEL    Collection Time: 04/17/18  2:40 AM   Result Value Ref Range    Protein, total 7.9 6.4 - 8.2 g/dL    Albumin 2.5 (L) 3.5 - 5.0 g/dL    Globulin 5.4 (H) 2.0 - 4.0 g/dL    A-G Ratio 0.5 (L) 1.1 - 2.2      Bilirubin, total 0.5 0.2 - 1.0 MG/DL    Bilirubin, direct <0.1 0.0 - 0.2 MG/DL    Alk.  phosphatase 398 (H) 45 - 117 U/L    AST (SGOT) 102 (H) 15 - 37 U/L    ALT (SGPT) 83 (H) 12 - 78 U/L   TROPONIN I    Collection Time: 04/17/18  2:40 AM   Result Value Ref Range    Troponin-I, Qt. 18.90 (H) <0.05 ng/mL   CK W/ CKMB & INDEX    Collection Time: 04/17/18  2:40 AM   Result Value Ref Range     26 - 192 U/L    CK - MB 10.8 (H) <3.6 NG/ML    CK-MB Index 7.6 (H) 0 - 2.5     GLUCOSE, POC    Collection Time: 04/17/18  6:34 AM   Result Value Ref Range Glucose (POC) 200 (H) 65 - 100 mg/dL    Performed by Aj Valle    EKG, 12 LEAD, INITIAL    Collection Time: 04/17/18  7:57 AM   Result Value Ref Range    Ventricular Rate 83 BPM    Atrial Rate 83 BPM    P-R Interval 152 ms    QRS Duration 86 ms    Q-T Interval 428 ms    QTC Calculation (Bezet) 502 ms    Calculated P Axis 48 degrees    Calculated R Axis 67 degrees    Calculated T Axis 128 degrees    Diagnosis       Normal sinus rhythm  Possible , old Anteroseptal infarct (cited on or before 17-APR-2018)  T wave abnormality, consider anterior ischemia  When compared with ECG of 16-APR-2018 08:58,  T wave inversion now evident in Anterior leads    Confirmed by Billy Noonan M.D., Vera Guardian ([id number]) on 4/17/2018 12:12:46 PM     GLUCOSE, POC    Collection Time: 04/17/18 11:37 AM   Result Value Ref Range    Glucose (POC) 170 (H) 65 - 100 mg/dL    Performed by Taran Conde        Principal Problem:    SOB (shortness of breath) (4/16/2018)        Medications reviewed  Current Facility-Administered Medications   Medication Dose Route Frequency    ascorbic acid (vitamin C) (VITAMIN C) tablet 500 mg  500 mg Oral DAILY    aspirin delayed-release tablet 81 mg  81 mg Oral DAILY    cholecalciferol (VITAMIN D3) tablet 1,000 Units  1,000 Units Oral DAILY    clopidogrel (PLAVIX) tablet 75 mg  75 mg Oral DAILY    folic acid (FOLVITE) tablet 1 mg  1 mg Oral DAILY    glimepiride (AMARYL) tablet 1 mg  1 mg Oral 7am    magnesium oxide (MAG-OX) tablet 400 mg  400 mg Oral DAILY    therapeutic multivitamin (THERAGRAN) tablet 1 Tab  1 Tab Oral DAILY    fish oil-omega-3 fatty acids 340-1,000 mg capsule 1 Cap  1 Cap Oral DAILY    potassium chloride SR (KLOR-CON 10) tablet 20 mEq  20 mEq Oral DAILY    SITagliptin (JANUVIA) tablet tab 50 mg  50 mg Oral DAILY    sodium chloride (NS) flush 5-10 mL  5-10 mL IntraVENous Q8H    sodium chloride (NS) flush 5-10 mL  5-10 mL IntraVENous PRN    acetaminophen (TYLENOL) tablet 650 mg  650 mg Oral Q4H PRN  ondansetron (ZOFRAN) injection 4 mg  4 mg IntraVENous Q4H PRN    enoxaparin (LOVENOX) injection 30 mg  30 mg SubCUTAneous Q24H    furosemide (LASIX) injection 40 mg  40 mg IntraVENous BID    levothyroxine (SYNTHROID) tablet 88 mcg  88 mcg Oral ACB    carvedilol (COREG) tablet 25 mg  25 mg Oral BID       DVT prophylaxis: Lovenox SC    Total time spent with patient: 30 minutes    Kelly Dey MD

## 2018-04-17 NOTE — DIABETES MGMT
DTC Progress Note    Recommendations/ Comments: Chart reviewed d/t elevated BG's. If appropriate, please consider starting:  - Correctional Lispro, normal sensitivity, AC & HS  - Also, please add A1c to next lab draw, if not already done, to better assess control PTA as last one on file is from 2011    Current hospital DM medication: Januvia 50 mg/d; Amaryl 1mg/d    Chart reviewed on Sandy Ledesma. Patient is a 82 y.o. female with known Type 2 Diabetes on Januvia 50 mg/d; Amaryl 1 mg/d; Metformin 1000 mg BID at home. A1c:   Lab Results   Component Value Date/Time    Hemoglobin A1c 6.9 (H) 11/30/2011 08:30 AM    Hemoglobin A1c 6.7 (H) 07/25/2011 08:44 AM       Recent Glucose Results: Lab Results   Component Value Date/Time     (H) 04/17/2018 02:40 AM    GLUCPOC 200 (H) 04/17/2018 06:34 AM        Lab Results   Component Value Date/Time    Creatinine 1.61 (H) 04/17/2018 02:40 AM     Estimated Creatinine Clearance: 20.7 mL/min (based on Cr of 1.61). Active Orders   Diet    DIET CARDIAC Regular; Consistent Carb 1800kcal        PO intake: Patient Vitals for the past 72 hrs:   % Diet Eaten   04/16/18 2000 0 %       Will continue to follow as needed. Thank you  Zohra Ott, JONATHAN, CDE

## 2018-04-17 NOTE — CONSULTS
[facility]   [address] [facility], [address] [place] [address], [facility]  Phone: [phone] NOTE     Patient: Yusra Arnold MRN: [id number]  PCP: Livan Garcia MD   :     1935  Age:   82 y.o. Sex:  female      Referring physician: Nicolas Wyman MD  Reason for consultation: 82 y.o. female with SOB (shortness of breath) complicated by JEROD   Admission Date: 2018  4:09 AM  LOS: 1 day      ASSESSMENT and PLAN :   JEROD on CKD   - 2/2 Acute MI, depressed LV function  +/- diuretics   - Cr rising and is at risk for significant worsening of renal failure at this time   - she is in need of Cardiac cath for NSTEMI and new WMA   - I have discussed the risks of worsening ARF , risk for temp dialysis and permanent dialysis with pt and her son at bedside   - suggested to pursue cardica cath if Cr remains 1.6-1.8 mg/dl tomorrow and may have to postpone if Cr > 2 tomorrow   - continue Lasix 40 mg BID   - screening Renal US ordered     CKD Stage III :  - baseline Cr 0.9-1 mg/dl lately   - Cr has steadily increased from 0.6 to 0.9 over the last few years and may be related to the time when she was on Jardiance     NSTEMI :  - Echo shows EF 35-45%, mod LVH, Severe Hypokinesis of apex and moderate Hypokinesis of anteroseptal wall  - cath plans per Dr Franco Mar     Acute Systolic CHF: 2/2 MI   - continue diuresis  - hold ACE/ARB    Chronic Pontine Infarcts     ? Brain Mets on MRI : hx of colon Ca    ___________________________________________________________________________    Thank you for asking us to see Ms Josie Garcia  Will follow   Discussed plans w/ Cardiology and pts family      Subjective:   HPI: Yusra Arnold is a 82 y.o.   female who has been admitted to the hospital for acute onset of SOB  She presented to ER in the early hours of yesterday morning via EMS with the above Sx   She was found to be in CHF on arrival and diagnosed with NSTEMI   She has been diuresed with lasix and has some improvement in SOB so far   She is currently not on IV heparin and her Trop is at 25   I have been asked to see her as her Cr which was 1 mg/dl on admission is now upto 1.6 mg/dl   She has been non oliguric with lasix   She is known to have mild renal dysfunction based on PCP labs    Recently she was Rx for Sinusitis infection and then had CT of head and MRI brain that showed chronic ischemic CVdisease and Pontine infarcts   Incidentally she was found to have possible brain mets  She was treated for colon Ca many years ago. She was supposed to be seen at [facility] tomorrow   Her Cr has increased from 0.7 to 0.9 when she was on jardiance     Past Medical Hx:   Past Medical History:   Diagnosis Date    Anemia 9/2/2009    Colon cancer ([facility] 9/2/2009    surgery/chemo    Colon cancer ([facility] 9/2/2009    DM (diabetes mellitus) ([facility] 9/2/2009    GERD (gastroesophageal reflux disease)     H/O: CVA 9/2/2009    slight l sided weakness    Hypothyroid 9/2/2009    Ill-defined condition     seasonal allergies    Microalbuminuria 9/2/2009    Osteoporosis 9/2/2009    Other and unspecified hyperlipidemia 1/27/2010    Rheumatoid arthritis involving ankle ([facility] 9/28/2016    Rheumatoid arthritis(714.0) 9/2/2009    Unspecified essential hypertension 9/2/2009    Weakness due to cerebrovascular accident         Past Surgical Hx:     Past Surgical History:   Procedure Laterality Date    HX COLECTOMY      HX HYSTERECTOMY      HX OTHER SURGICAL      colonoscopies numerous since 1993         Allergies   Allergen Reactions    Statins-Hmg-Coa Reductase Inhibitors Other (comments)     Intolerant to statins     Sulfa (Sulfonamide Antibiotics) Other (comments)     syncope       Social Hx:  reports that she has never smoked. She has never used smokeless tobacco. She reports that she does not drink alcohol or use illicit drugs.      Family History   Problem Relation Age of Onset    Diabetes Mother     Stroke Mother     Diabetes Sister    Bowen Alvarado Other Sister      fell and hit her head -  of this   Bowen Alvarado Diabetes Brother     Cancer Father      stomach    Diabetes Sister        Review of Systems:  A twelve point review of system was performed today. Pertinent positives and negatives are mentioned in the HPI. The reminder of the ROS is negative and noncontributory. Objective:    Vitals:    Vitals:    18 0202 18 0751 18 1131 18 1529   BP: 124/60 160/57 114/51 133/56   Pulse: 82 84 76 92   Resp:    Temp: 97.8 °F (36.6 °C) 97.9 °F (36.6 °C) 97.8 °F (36.6 °C) 98.2 °F (36.8 °C)   SpO2: 100% 97% 96% 94%   Weight: 57.1 kg (125 lb 14.1 oz)      Height:         I&O's:   0701 -  0700  In: 1470 [P.O.:1470]  Out: 1175 [Urine:1175]  Visit Vitals    /56 (BP 1 Location: Right arm, BP Patient Position: At rest)    Pulse 92    Temp 98.2 °F (36.8 °C)    Resp 19    Ht 5' 1\" (1.549 m)    Wt 57.1 kg (125 lb 14.1 oz)  Comment: pt. weighed with standing scale, previous in bed    SpO2 94%    BMI 23.79 kg/m2       Physical Exam:  General:ill looking and appears stated age   HEENT: anicteric   Neck: No JVD   Lungs : B/L rales +  CVS: RRR,no murmur   Abdomen: Soft, NT  Extremities: edema +  Skin: No rash or lesions.   MS: No joint swelling, erythema, warmth  Neurologic: non focal, AAO x 3  Psych: normal affect    Laboratory Results:    Recent Labs      18   0240  18   0421   NA  130*  135*   K  4.9  5.2*   CL  97  103   CO2  23  23   GLU  192*  301*   BUN  31*  25*   CREA  1.61*  1.36*   CA  9.3  9.6   MG   --   2.2   ALB  2.5*  2.5*   SGOT  102*  90*   ALT  83*  107*     Recent Labs      18   0240  18   0421   WBC  9.5  12.0*   HGB  9.7*  9.4*   HCT  30.7*  30.4*   PLT  478*  561*     No results found for: SDES  Lab Results   Component Value Date/Time    Culture result: NO GROWTH 2 DAYS 2018 08:18 PM     Recent Results (from the past 24 hour(s))   METABOLIC PANEL, BASIC    Collection Time: 04/17/18  2:40 AM   Result Value Ref Range    Sodium 130 (L) 136 - 145 mmol/L    Potassium 4.9 3.5 - 5.1 mmol/L    Chloride 97 97 - 108 mmol/L    CO2 23 21 - 32 mmol/L    Anion gap 10 5 - 15 mmol/L    Glucose 192 (H) 65 - 100 mg/dL    BUN 31 (H) 6 - 20 MG/DL    Creatinine 1.61 (H) 0.55 - 1.02 MG/DL    BUN/Creatinine ratio 19 12 - 20      GFR est AA 37 (L) >60 ml/min/1.73m2    GFR est non-AA 31 (L) >60 ml/min/1.73m2    Calcium 9.3 8.5 - 10.1 MG/DL   CBC W/O DIFF    Collection Time: 04/17/18  2:40 AM   Result Value Ref Range    WBC 9.5 3.6 - 11.0 K/uL    RBC 3.37 (L) 3.80 - 5.20 M/uL    HGB 9.7 (L) 11.5 - 16.0 g/dL    HCT 30.7 (L) 35.0 - 47.0 %    MCV 91.1 80.0 - 99.0 FL    MCH 28.8 26.0 - 34.0 PG    MCHC 31.6 30.0 - 36.5 g/dL    RDW 23.0 (H) 11.5 - 14.5 %    PLATELET 909 (H) 268 - 400 K/uL    MPV 9.5 8.9 - 12.9 FL    NRBC 0.0 0  WBC    ABSOLUTE NRBC 0.00 0.00 - 0.01 K/uL   HEPATIC FUNCTION PANEL    Collection Time: 04/17/18  2:40 AM   Result Value Ref Range    Protein, total 7.9 6.4 - 8.2 g/dL    Albumin 2.5 (L) 3.5 - 5.0 g/dL    Globulin 5.4 (H) 2.0 - 4.0 g/dL    A-G Ratio 0.5 (L) 1.1 - 2.2      Bilirubin, total 0.5 0.2 - 1.0 MG/DL    Bilirubin, direct <0.1 0.0 - 0.2 MG/DL    Alk.  phosphatase 398 (H) 45 - 117 U/L    AST (SGOT) 102 (H) 15 - 37 U/L    ALT (SGPT) 83 (H) 12 - 78 U/L   TROPONIN I    Collection Time: 04/17/18  2:40 AM   Result Value Ref Range    Troponin-I, Qt. 18.90 (H) <0.05 ng/mL   CK W/ CKMB & INDEX    Collection Time: 04/17/18  2:40 AM   Result Value Ref Range     26 - 192 U/L    CK - MB 10.8 (H) <3.6 NG/ML    CK-MB Index 7.6 (H) 0 - 2.5     GLUCOSE, POC    Collection Time: 04/17/18  6:34 AM   Result Value Ref Range    Glucose (POC) 200 (H) 65 - 100 mg/dL    Performed by Vipul German    EKG, 12 LEAD, INITIAL    Collection Time: 04/17/18  7:57 AM   Result Value Ref Range    Ventricular Rate 83 BPM    Atrial Rate 83 BPM    P-R Interval 152 ms    QRS Duration 86 ms    Q-T Interval 428 ms    QTC Calculation (Bezet) 502 ms    Calculated P Axis 48 degrees    Calculated R Axis 67 degrees    Calculated T Axis 128 degrees    Diagnosis       Normal sinus rhythm  Possible , old Anteroseptal infarct (cited on or before 17-APR-2018)  T wave abnormality, consider anterior ischemia  When compared with ECG of 16-APR-2018 08:58,  T wave inversion now evident in Anterior leads    Confirmed by Dilan Walsh M.D., Zeinab Gibson ([id number]) on 4/17/2018 12:12:46 PM     GLUCOSE, POC    Collection Time: 04/17/18 11:37 AM   Result Value Ref Range    Glucose (POC) 170 (H) 65 - 100 mg/dL    Performed by Rossy Conde    GLUCOSE, POC    Collection Time: 04/17/18  4:48 PM   Result Value Ref Range    Glucose (POC) 203 (H) 65 - 100 mg/dL    Performed by Jerry Frankel            Urine dipstick:   Lab Results   Component Value Date/Time    Color YELLOW/STRAW 02/09/2018 07:56 PM    Appearance CLEAR 02/09/2018 07:56 PM    Specific gravity 1.018 02/09/2018 07:56 PM    pH (UA) 5.5 02/09/2018 07:56 PM    Protein NEGATIVE  02/09/2018 07:56 PM    Glucose NEGATIVE  02/09/2018 07:56 PM    Ketone NEGATIVE  02/09/2018 07:56 PM    Bilirubin NEGATIVE  02/09/2018 07:56 PM    Urobilinogen 0.2 02/09/2018 07:56 PM    Nitrites NEGATIVE  02/09/2018 07:56 PM    Leukocyte Esterase NEGATIVE  02/09/2018 07:56 PM    Epithelial cells FEW 02/09/2018 07:56 PM    Bacteria NEGATIVE  02/09/2018 07:56 PM    WBC 0-4 02/09/2018 07:56 PM    RBC 0-5 02/09/2018 07:56 PM          Medications list Personally Reviewed   [x]      Yes     []               No       Medications:  Prior to Admission medications    Medication Sig Start Date End Date Taking? Authorizing Provider   glimepiride (AMARYL) 1 mg tablet Take 1 mg by mouth every morning. Yes Phys Other, MD   levothyroxine (SYNTHROID) 88 mcg tablet Take 88 mcg by mouth Daily (before breakfast).    Yes Historical Provider   etanercept (ENBREL) 50 mg/mL (0.98 mL) injection 1 mL by SubCUTAneous route every seven (7) days. 3/16/18  Yes Katelynn Tee MD   FORTEO 20 mcg/dose - 600 mcg/2.4 mL pnij injection USE 1 INJECTION (20 MCG OR 0.08ML) UNDER THE SKIN ONCE DAILY. 3/12/18  Yes Katelynn Tee MD   clopidogrel (PLAVIX) 75 mg tab Take 1 Tab by mouth daily. 3/5/18  Yes Min Hoff MD   evolocumab MAIN LINE [facility] pen injection 1 mL by SubCUTAneous route every fourteen (14) days. 1/11/18  Yes Milagros Waller MD   folic acid (FOLVITE) 1 mg tablet TAKE ONE TABLET BY MOUTH DAILY 10/9/17  Yes Katelynn Tee MD   carvedilol (COREG) 25 mg tablet Take 25 mg by mouth two (2) times a day. 11/11/16  Yes Historical Provider   potassium chloride SR (KLOR-CON 10) 10 mEq tablet Take 20 mEq by mouth daily. 12/18/16  Yes Historical Provider   chlorthalidone (HYGROTEN) 50 mg tablet Take 50 mg by mouth daily. Yes Historical Provider   sitaGLIPtin (JANUVIA) 25 mg tablet Take 50 mg by mouth daily. Yes Historical Provider   VIT C/VIT E/LUTEIN/MIN/OMEGA-3 (OCUVITE PO) Take 1 Tab by mouth daily. Yes Historical Provider   MAGNESIUM MALATE Take 400 mg by mouth daily. Yes Historical Provider   MULTIVITAMIN WITH MINERALS (HAIR,SKIN & NAILS PO) Take 1 Tab by mouth daily. Yes Historical Provider   aspirin delayed-release 81 mg tablet Take 81 mg by mouth daily. Yes Historical Provider   metFORMIN (GLUCOPHAGE) 1,000 mg tablet Take 1 Tab by mouth two (2) times daily (with meals). 4/11/12  Yes Derrick Aguirre MD   OMEGA-3 FATTY ACIDS/FISH OIL (OMEGA 3 FISH OIL PO) Take 300 mg by mouth daily. Yes Historical Provider   ascorbate calcium (MAKAYLA-C) 500 mg Tab Take 1,000 mg by mouth daily. 4/15/11  Yes Historical Provider   CHOLECALCIFEROL, VITAMIN D3, (VITAMIN D-3 PO) Take 1,000 Units by mouth daily. Yes Historical Provider        Thank you for allowing us to participate in the care of this patient. We will follow patient.  Please dont hesitate to call with any questions    King Begum, [address] [place] [address] Nephrology [facility] for [address]  Phone - [phone]   Fax - [phone]  [email or web address]

## 2018-04-17 NOTE — PROGRESS NOTES
Cardiology Progress Note            Admit Date: 4/16/2018  Admit Diagnosis: SOB (shortness of breath)  Date: 4/17/2018     Time: 2:01 PM    HPI: 82 y.o. Female with new diagnosis of CHF. Presented with chief c/o of SOB, found to have pulmonary edema and EF 40% on TTE. Troponin elevated and trending up, EKG with poor R wave progression and now inverted t waves   In anterior leads. Pt clinical status also complicated by elevated creatinine requiring that LHC be postponed. Assessment and Plan     1. NSTEMI:  Troponin trending up 18.9 from 3.28.   -no chest pain  -12 lead EKG with t wave inversions anterior leads. Concerned for possible LAD   -TTE;  EF 40%, severe hypokinesis of apical wals, mod hypk of basal mid anteroseptal wall. Mod LVH, Mild-mod MR, mild TR  -Continue ASA, coreg, plavix  -Statin intolerant  -Concerned for possible LAD lesion, but with creatinine rising concerned that LHC would lead to pt needing dialysis. If creatinine improves, will consider LHC on Thursday. Tentatively scheduled LHC for 7AM, pending improvement in creatinine. 2. Cardiomyopathy/Acute HFrEF, new:  EF 40% NYHA IV on admission. Suspect NYHA III today   -improved clinically but still appears to be in failure  -Continue Coreg, plavix, ASA  -No Ace-I due to elevated creatinine  -Unfortunately still needs diuresis which may worsen creatinine - continue Lasix 40 mg IV BID    3. Elevated creatinine: creatinine 1.61 from 1.36. GFR 31 today from 40  -Nephrology consult    4. Anemia: hgb 9.7  -management per renal/primary team    5. Hx of essential HTN; bp currently controlled    Pt with NSTEMI, new HFrEF and worsening renal function. Suspect significant CAD, at least suspect LAD involvement but will postpone LHC at this time due to concern that it would worsen renal function and lead to need for HD. Will consult nephrology.   LHC tentative scheduled for Thursday at [address] [place] [address], if renal function improves. Pt remains with some evidence of failure and requiring continued diuresis. Consider palliative involvement. Cardiology Attending:Patient seen and examined. I agree with NP assessment and plans. Still in CHF by exam, creatinine increasing, high risk for dialysis if cath done now, prognosis poor. Annie Pak MD 4/17/2018 3:20 PM         Subjective:   Prakash Rothman denies chest pain, abdominal pain, nausea. States she feels better, SOB much improved, says she is able to lie flat. Objective:      Physical Exam:                Visit Vitals    /51 (BP 1 Location: Right arm, BP Patient Position: At rest)    Pulse 76    Temp 97.8 °F (36.6 °C)    Resp 17    Ht 5' 1\" (1.549 m)    Wt 57.1 kg (125 lb 14.1 oz)  Comment: pt. weighed with standing scale, previous in bed    SpO2 96%    BMI 23.79 kg/m2          General Appearance:   Well developed, elderly alert female in no acute distress. Ears/Nose/Mouth/Throat:    Hearing grossly normal.         Neck:  Supple. Chest:    Lungs with bibasilar crackles. Off O2   Cardiovascular:    Regular rate and rhythm, S1, S2 normal, no murmur. Abdomen:    Soft, non-tender, bowel sounds are active. Extremities:  No edema bilaterally. Skin:  Warm and dry.      Telemetry: NSR          Data Review:    Labs:    Recent Results (from the past 24 hour(s))   METABOLIC PANEL, BASIC    Collection Time: 04/17/18  2:40 AM   Result Value Ref Range    Sodium 130 (L) 136 - 145 mmol/L    Potassium 4.9 3.5 - 5.1 mmol/L    Chloride 97 97 - 108 mmol/L    CO2 23 21 - 32 mmol/L    Anion gap 10 5 - 15 mmol/L    Glucose 192 (H) 65 - 100 mg/dL    BUN 31 (H) 6 - 20 MG/DL    Creatinine 1.61 (H) 0.55 - 1.02 MG/DL    BUN/Creatinine ratio 19 12 - 20      GFR est AA 37 (L) >60 ml/min/1.73m2    GFR est non-AA 31 (L) >60 ml/min/1.73m2    Calcium 9.3 8.5 - 10.1 MG/DL   CBC W/O DIFF    Collection Time: 04/17/18  2:40 AM   Result Value Ref Range WBC 9.5 3.6 - 11.0 K/uL    RBC 3.37 (L) 3.80 - 5.20 M/uL    HGB 9.7 (L) 11.5 - 16.0 g/dL    HCT 30.7 (L) 35.0 - 47.0 %    MCV 91.1 80.0 - 99.0 FL    MCH 28.8 26.0 - 34.0 PG    MCHC 31.6 30.0 - 36.5 g/dL    RDW 23.0 (H) 11.5 - 14.5 %    PLATELET 574 (H) 935 - 400 K/uL    MPV 9.5 8.9 - 12.9 FL    NRBC 0.0 0  WBC    ABSOLUTE NRBC 0.00 0.00 - 0.01 K/uL   HEPATIC FUNCTION PANEL    Collection Time: 04/17/18  2:40 AM   Result Value Ref Range    Protein, total 7.9 6.4 - 8.2 g/dL    Albumin 2.5 (L) 3.5 - 5.0 g/dL    Globulin 5.4 (H) 2.0 - 4.0 g/dL    A-G Ratio 0.5 (L) 1.1 - 2.2      Bilirubin, total 0.5 0.2 - 1.0 MG/DL    Bilirubin, direct <0.1 0.0 - 0.2 MG/DL    Alk.  phosphatase 398 (H) 45 - 117 U/L    AST (SGOT) 102 (H) 15 - 37 U/L    ALT (SGPT) 83 (H) 12 - 78 U/L   TROPONIN I    Collection Time: 04/17/18  2:40 AM   Result Value Ref Range    Troponin-I, Qt. 18.90 (H) <0.05 ng/mL   CK W/ CKMB & INDEX    Collection Time: 04/17/18  2:40 AM   Result Value Ref Range     26 - 192 U/L    CK - MB 10.8 (H) <3.6 NG/ML    CK-MB Index 7.6 (H) 0 - 2.5     GLUCOSE, POC    Collection Time: 04/17/18  6:34 AM   Result Value Ref Range    Glucose (POC) 200 (H) 65 - 100 mg/dL    Performed by Quentin Han    EKG, 12 LEAD, INITIAL    Collection Time: 04/17/18  7:57 AM   Result Value Ref Range    Ventricular Rate 83 BPM    Atrial Rate 83 BPM    P-R Interval 152 ms    QRS Duration 86 ms    Q-T Interval 428 ms    QTC Calculation (Bezet) 502 ms    Calculated P Axis 48 degrees    Calculated R Axis 67 degrees    Calculated T Axis 128 degrees    Diagnosis       Normal sinus rhythm  Possible , old Anteroseptal infarct (cited on or before 17-APR-2018)  T wave abnormality, consider anterior ischemia  When compared with ECG of 16-APR-2018 08:58,  T wave inversion now evident in Anterior leads    Confirmed by Aiden Hawkins M.D., [facility] Duncan ([id number]) on 4/17/2018 12:12:46 PM     GLUCOSE, POC    Collection Time: 04/17/18 11:37 AM   Result Value Ref Range Glucose (POC) 170 (H) 65 - 100 mg/dL    Performed by Katarina Puentes           Radiology:        Current Facility-Administered Medications   Medication Dose Route Frequency    ascorbic acid (vitamin C) (VITAMIN C) tablet 500 mg  500 mg Oral DAILY    aspirin delayed-release tablet 81 mg  81 mg Oral DAILY    cholecalciferol (VITAMIN D3) tablet 1,000 Units  1,000 Units Oral DAILY    clopidogrel (PLAVIX) tablet 75 mg  75 mg Oral DAILY    folic acid (FOLVITE) tablet 1 mg  1 mg Oral DAILY    glimepiride (AMARYL) tablet 1 mg  1 mg Oral 7am    magnesium oxide (MAG-OX) tablet 400 mg  400 mg Oral DAILY    therapeutic multivitamin (THERAGRAN) tablet 1 Tab  1 Tab Oral DAILY    fish oil-omega-3 fatty acids 340-1,000 mg capsule 1 Cap  1 Cap Oral DAILY    potassium chloride SR (KLOR-CON 10) tablet 20 mEq  20 mEq Oral DAILY    SITagliptin (JANUVIA) tablet tab 50 mg  50 mg Oral DAILY    sodium chloride (NS) flush 5-10 mL  5-10 mL IntraVENous Q8H    sodium chloride (NS) flush 5-10 mL  5-10 mL IntraVENous PRN    acetaminophen (TYLENOL) tablet 650 mg  650 mg Oral Q4H PRN    ondansetron (ZOFRAN) injection 4 mg  4 mg IntraVENous Q4H PRN    enoxaparin (LOVENOX) injection 30 mg  30 mg SubCUTAneous Q24H    furosemide (LASIX) injection 40 mg  40 mg IntraVENous BID    levothyroxine (SYNTHROID) tablet 88 mcg  88 mcg Oral ACB    carvedilol (COREG) tablet 25 mg  25 mg Oral BID          Ayan Knapp.  MARIETTA [place]     [facility] [address] [place] [address], [address] [place] [address] [address]   Radha [place]   [phone]

## 2018-04-17 NOTE — ED TRIAGE NOTES
Bedside shift change report given to Lawson (oncoming nurse) by David Beltrán (offgoing nurse). Report included the following information SBAR, ED Summary and Cardiac Rhythm NSR. TRANSFER - OUT REPORT:    Verbal report given to Ina(name) on Trenton Larios  being transferred to [facility]) for routine progression of care       Report consisted of patients Situation, Background, Assessment and   Recommendations(SBAR). Information from the following report(s) SBAR, ED Summary and Cardiac Rhythm NSR was reviewed with the receiving nurse. Lines:   Peripheral IV 04/16/18 Right Antecubital (Active)   Site Assessment Clean, dry, & intact 4/16/2018  8:00 PM   Phlebitis Assessment 0 4/16/2018  8:00 PM   Infiltration Assessment 0 4/16/2018  8:00 PM   Dressing Status Clean, dry, & intact 4/16/2018  8:00 PM   Dressing Type Tape;Transparent 4/16/2018  8:00 PM   Hub Color/Line Status Blue 4/16/2018  8:00 PM   Action Taken Open ports on tubing capped 4/16/2018  8:00 PM   Alcohol Cap Used Yes 4/16/2018  8:00 PM        Opportunity for questions and clarification was provided.       Patient transported with:   Monitor  Registered Nurse

## 2018-04-17 NOTE — CARDIO/PULMONARY
Cardiac Rehab: MI education folder, with catheterization brochure, to bedside of Cyn Aguilar. Educated using teach back method. Reviewed MI diagnosis definition and purpose of intervention. Discussed risk factors for CAD to include the following: family history, elevated BMI, hyperlipidemia, hypertension, diabetes, stress, and smoking. Patient is a non smoker. Discussed Heart Healthy/Low Sodium (2000 mg) diet. Reviewed the importance of medication compliance, the purpose of plavix, and potential side effects. Discussed follow up appointments with cardiologist, signs and symptoms of angina, and what to report to physician after discharge. Emphasized the value of cardiac rehab. Discussed Cardiac Rehab Program format, benefits, and encouraged enrollment to assist with risk modification and management. Patient doesn't drive and relies on her son to take her places. He travels frequently for work so she will need to discuss it with him. Sandy Ledesma verbalized understanding with questions answered.  Darell Gay RN

## 2018-04-17 NOTE — PROGRESS NOTES
Problem: Falls - Risk of  Goal: *Absence of Falls  Document Yaron Fall Risk and appropriate interventions in the flowsheet. Outcome: Progressing Towards Goal  Fall Risk Interventions:  Mobility Interventions: Communicate number of staff needed for ambulation/transfer, Patient to call before getting OOB, PT Consult for mobility concerns, PT Consult for assist device competence         Medication Interventions: Assess postural VS orthostatic hypotension, Evaluate medications/consider consulting pharmacy, Patient to call before getting OOB, Teach patient to arise slowly    Elimination Interventions: Call light in reach, Patient to call for help with toileting needs, Toilet paper/wipes in reach, Toileting schedule/hourly rounds       Bed in low position, locked bed wheels. Call bell and personal items within reach. Hourly rounding performed. Instructed patient to call when needing assistance. Patient demonstrates understanding. 0745 Bedside and Verbal shift change report given to Jayna Samuels RN (oncoming nurse) by Jesus Smith RN (offgoing nurse). Report included the following information SBAR, Kardex, Intake/Output, MAR, Recent Results and Cardiac Rhythm NSR .

## 2018-04-17 NOTE — NURSE NAVIGATOR
Chart reviewed by Heart Failure Nurse Navigator. Heart Failure database completed. EF:  35/45  ACEi/ARB: Please document contraindication or specific reason no ACE/ARB prescribed. EF 35/45  BB: Coreg  Aldosterone Antagonist: Please consider a guideline directed Aldosterone Receptor Antagonist for patients with an EF of 35% or less and a NYHA functional class of II-IV unless contraindicated. Contraindications include allergy due to aldosterone receptor antagonist, hyperkalemia,(potassium above 5.0 mEq/L ) and renal dysfunction. (creatinine >2.5 mg/dL in men or >2.0 mg/dL in women (or estimated glomerular filtration rate < 30 ml/min/1.732m2)    CRT not indicated. NYHA Functional Class Requested via provider message on [employer]. Heart Failure Teach Back in Patient Education. Heart Failure Avoiding Triggers on Discharge Instructions. Cardiologist: KATHLEEN    Outpatient nurse navigator notified of admission.

## 2018-04-17 NOTE — CDMP QUERY
There is documentation in the H & P of the pt having \"Congestive heart failure exacerbation\" and then in PN of acute CHF.   Based on the need for increased specificity please include the following components in your documentation regarding:  CHF     Documentation for heart failure must:      Specify Acuity :   Acute, Chronic, acute on chronic     Identify Type:    Systolic, Diastolic, Combined systolic and diastolic failure      List the relationship of HTN to Heart Failure     Identify the underlying cause     Thank you,     Abbie Quigley RN, BSN, [place] [address], [facility]  [phone]

## 2018-04-17 NOTE — PROGRESS NOTES
Reason for Admission:              SOB  RRAT Score:              47  Resources/supports as identified by patient/family:        Family          Top Challenges facing patient (as identified by patient/family and CM): Finances/Medication cost?             Patient has MC/Caid. Transportation? Patient has family to provide transportation, and Caid provides it as well if needed. Support system or lack thereof? Family, Williams Gomezfannybarb. Living arrangements? Patient is a  who lives alone. Self-care/ADLs/Cognition? Independent with ADLs. Cognition is appropriate. Current Advanced Directive/Advance Care Plan:   No.                       Plan for utilizing home health:                      TBD  Likelihood of readmission:             Low. Patient has CHF, but has not had any admissions. She sees her PCP. Transition of Care Plan:              F/U with PCP, [facility] for CHF is no other HH needs, TBD.

## 2018-04-18 LAB
ALBUMIN SERPL-MCNC: 2.6 G/DL (ref 3.5–5)
ALBUMIN SERPL-MCNC: 2.7 G/DL (ref 3.5–5)
ALBUMIN/GLOB SERPL: 0.5 {RATIO} (ref 1.1–2.2)
ALP SERPL-CCNC: 935 U/L (ref 45–117)
ALT SERPL-CCNC: 63 U/L (ref 12–78)
ANION GAP SERPL CALC-SCNC: 10 MMOL/L (ref 5–15)
ANION GAP SERPL CALC-SCNC: 11 MMOL/L (ref 5–15)
APPEARANCE UR: ABNORMAL
AST SERPL-CCNC: 181 U/L (ref 15–37)
ATRIAL RATE: 92 BPM
BACTERIA URNS QL MICRO: ABNORMAL /HPF
BASOPHILS # BLD: 0.1 K/UL (ref 0–0.1)
BASOPHILS NFR BLD: 1 % (ref 0–1)
BILIRUB SERPL-MCNC: 0.4 MG/DL (ref 0.2–1)
BILIRUB UR QL: NEGATIVE
BUN SERPL-MCNC: 30 MG/DL (ref 6–20)
BUN SERPL-MCNC: 31 MG/DL (ref 6–20)
BUN/CREAT SERPL: 18 (ref 12–20)
BUN/CREAT SERPL: 20 (ref 12–20)
CALCIUM SERPL-MCNC: 9.3 MG/DL (ref 8.5–10.1)
CALCIUM SERPL-MCNC: 9.7 MG/DL (ref 8.5–10.1)
CALCULATED P AXIS, ECG09: 59 DEGREES
CALCULATED R AXIS, ECG10: 63 DEGREES
CALCULATED T AXIS, ECG11: 144 DEGREES
CHLORIDE SERPL-SCNC: 95 MMOL/L (ref 97–108)
CHLORIDE SERPL-SCNC: 95 MMOL/L (ref 97–108)
CO2 SERPL-SCNC: 27 MMOL/L (ref 21–32)
CO2 SERPL-SCNC: 27 MMOL/L (ref 21–32)
COLOR UR: ABNORMAL
CREAT SERPL-MCNC: 1.55 MG/DL (ref 0.55–1.02)
CREAT SERPL-MCNC: 1.63 MG/DL (ref 0.55–1.02)
DIAGNOSIS, 93000: NORMAL
DIFFERENTIAL METHOD BLD: ABNORMAL
EOSINOPHIL # BLD: 0.1 K/UL (ref 0–0.4)
EOSINOPHIL NFR BLD: 1 % (ref 0–7)
EPITH CASTS URNS QL MICRO: ABNORMAL /LPF
ERYTHROCYTE [DISTWIDTH] IN BLOOD BY AUTOMATED COUNT: 22.8 % (ref 11.5–14.5)
EST. AVERAGE GLUCOSE BLD GHB EST-MCNC: 154 MG/DL
GLOBULIN SER CALC-MCNC: 5.9 G/DL (ref 2–4)
GLUCOSE BLD STRIP.AUTO-MCNC: 215 MG/DL (ref 65–100)
GLUCOSE BLD STRIP.AUTO-MCNC: 221 MG/DL (ref 65–100)
GLUCOSE BLD STRIP.AUTO-MCNC: 254 MG/DL (ref 65–100)
GLUCOSE BLD STRIP.AUTO-MCNC: 313 MG/DL (ref 65–100)
GLUCOSE BLD STRIP.AUTO-MCNC: 328 MG/DL (ref 65–100)
GLUCOSE SERPL-MCNC: 268 MG/DL (ref 65–100)
GLUCOSE SERPL-MCNC: 279 MG/DL (ref 65–100)
GLUCOSE UR STRIP.AUTO-MCNC: NEGATIVE MG/DL
HBA1C MFR BLD: 7 % (ref 4.2–6.3)
HCT VFR BLD AUTO: 28.8 % (ref 35–47)
HGB BLD-MCNC: 9.6 G/DL (ref 11.5–16)
HGB UR QL STRIP: ABNORMAL
HYALINE CASTS URNS QL MICRO: ABNORMAL /LPF (ref 0–5)
IMM GRANULOCYTES # BLD: 0 K/UL
IMM GRANULOCYTES NFR BLD AUTO: 0 %
KETONES UR QL STRIP.AUTO: NEGATIVE MG/DL
LEUKOCYTE ESTERASE UR QL STRIP.AUTO: ABNORMAL
LYMPHOCYTES # BLD: 1.2 K/UL (ref 0.8–3.5)
LYMPHOCYTES NFR BLD: 12 % (ref 12–49)
MCH RBC QN AUTO: 29.1 PG (ref 26–34)
MCHC RBC AUTO-ENTMCNC: 33.3 G/DL (ref 30–36.5)
MCV RBC AUTO: 87.3 FL (ref 80–99)
MONOCYTES # BLD: 2 K/UL (ref 0–1)
MONOCYTES NFR BLD: 19 % (ref 5–13)
NEUTS SEG # BLD: 7 K/UL (ref 1.8–8)
NEUTS SEG NFR BLD: 67 % (ref 32–75)
NITRITE UR QL STRIP.AUTO: POSITIVE
NRBC # BLD: 0 K/UL (ref 0–0.01)
NRBC BLD-RTO: 0 PER 100 WBC
P-R INTERVAL, ECG05: 148 MS
PH UR STRIP: 6.5 [PH] (ref 5–8)
PHOSPHATE SERPL-MCNC: 3.5 MG/DL (ref 2.6–4.7)
PLATELET # BLD AUTO: 392 K/UL (ref 150–400)
PLATELET COMMENTS,PCOM: ABNORMAL
PMV BLD AUTO: 9.7 FL (ref 8.9–12.9)
POTASSIUM SERPL-SCNC: 4 MMOL/L (ref 3.5–5.1)
POTASSIUM SERPL-SCNC: 4 MMOL/L (ref 3.5–5.1)
PROT SERPL-MCNC: 8.6 G/DL (ref 6.4–8.2)
PROT UR STRIP-MCNC: NEGATIVE MG/DL
Q-T INTERVAL, ECG07: 382 MS
QRS DURATION, ECG06: 86 MS
QTC CALCULATION (BEZET), ECG08: 472 MS
RBC # BLD AUTO: 3.3 M/UL (ref 3.8–5.2)
RBC #/AREA URNS HPF: ABNORMAL /HPF (ref 0–5)
RBC MORPH BLD: ABNORMAL
RBC MORPH BLD: ABNORMAL
SERVICE CMNT-IMP: ABNORMAL
SODIUM SERPL-SCNC: 132 MMOL/L (ref 136–145)
SODIUM SERPL-SCNC: 133 MMOL/L (ref 136–145)
SP GR UR REFRACTOMETRY: 1.02 (ref 1–1.03)
TROPONIN I SERPL-MCNC: 12.7 NG/ML
UROBILINOGEN UR QL STRIP.AUTO: 0.2 EU/DL (ref 0.2–1)
VENTRICULAR RATE, ECG03: 92 BPM
WBC # BLD AUTO: 10.4 K/UL (ref 3.6–11)
WBC URNS QL MICRO: ABNORMAL /HPF (ref 0–4)

## 2018-04-18 PROCEDURE — 74011250636 HC RX REV CODE- 250/636: Performed by: NURSE PRACTITIONER

## 2018-04-18 PROCEDURE — 74011250636 HC RX REV CODE- 250/636: Performed by: INTERNAL MEDICINE

## 2018-04-18 PROCEDURE — 74011250637 HC RX REV CODE- 250/637: Performed by: INTERNAL MEDICINE

## 2018-04-18 PROCEDURE — 87040 BLOOD CULTURE FOR BACTERIA: CPT | Performed by: HOSPITALIST

## 2018-04-18 PROCEDURE — 82962 GLUCOSE BLOOD TEST: CPT

## 2018-04-18 PROCEDURE — 93005 ELECTROCARDIOGRAM TRACING: CPT

## 2018-04-18 PROCEDURE — 36415 COLL VENOUS BLD VENIPUNCTURE: CPT | Performed by: NURSE PRACTITIONER

## 2018-04-18 PROCEDURE — 80069 RENAL FUNCTION PANEL: CPT | Performed by: NURSE PRACTITIONER

## 2018-04-18 PROCEDURE — 65660000001 HC RM ICU INTERMED STEPDOWN

## 2018-04-18 PROCEDURE — 84484 ASSAY OF TROPONIN QUANT: CPT | Performed by: NURSE PRACTITIONER

## 2018-04-18 PROCEDURE — 74011250637 HC RX REV CODE- 250/637: Performed by: HOSPITALIST

## 2018-04-18 PROCEDURE — 85025 COMPLETE CBC W/AUTO DIFF WBC: CPT | Performed by: NURSE PRACTITIONER

## 2018-04-18 PROCEDURE — 80053 COMPREHEN METABOLIC PANEL: CPT | Performed by: NURSE PRACTITIONER

## 2018-04-18 PROCEDURE — 74011636637 HC RX REV CODE- 636/637: Performed by: HOSPITALIST

## 2018-04-18 PROCEDURE — 81001 URINALYSIS AUTO W/SCOPE: CPT | Performed by: HOSPITALIST

## 2018-04-18 PROCEDURE — 83036 HEMOGLOBIN GLYCOSYLATED A1C: CPT | Performed by: HOSPITALIST

## 2018-04-18 PROCEDURE — 74011250637 HC RX REV CODE- 250/637: Performed by: EMERGENCY MEDICINE

## 2018-04-18 RX ORDER — DEXTROSE 50 % IN WATER (D50W) INTRAVENOUS SYRINGE
12.5-25 AS NEEDED
Status: DISCONTINUED | OUTPATIENT
Start: 2018-04-18 | End: 2018-05-11 | Stop reason: HOSPADM

## 2018-04-18 RX ORDER — INSULIN LISPRO 100 [IU]/ML
INJECTION, SOLUTION INTRAVENOUS; SUBCUTANEOUS
Status: DISCONTINUED | OUTPATIENT
Start: 2018-04-18 | End: 2018-04-18

## 2018-04-18 RX ORDER — MAGNESIUM SULFATE 100 %
4 CRYSTALS MISCELLANEOUS AS NEEDED
Status: DISCONTINUED | OUTPATIENT
Start: 2018-04-18 | End: 2018-05-11 | Stop reason: HOSPADM

## 2018-04-18 RX ORDER — INSULIN LISPRO 100 [IU]/ML
INJECTION, SOLUTION INTRAVENOUS; SUBCUTANEOUS
Status: DISCONTINUED | OUTPATIENT
Start: 2018-04-18 | End: 2018-05-11 | Stop reason: HOSPADM

## 2018-04-18 RX ORDER — CARVEDILOL 12.5 MG/1
12.5 TABLET ORAL 2 TIMES DAILY
Status: DISCONTINUED | OUTPATIENT
Start: 2018-04-18 | End: 2018-04-19

## 2018-04-18 RX ADMIN — OXYCODONE HYDROCHLORIDE AND ACETAMINOPHEN 500 MG: 500 TABLET ORAL at 09:42

## 2018-04-18 RX ADMIN — ONDANSETRON 4 MG: 2 INJECTION INTRAMUSCULAR; INTRAVENOUS at 13:41

## 2018-04-18 RX ADMIN — ASPIRIN 81 MG: 81 TABLET, COATED ORAL at 09:42

## 2018-04-18 RX ADMIN — FUROSEMIDE 40 MG: 10 INJECTION, SOLUTION INTRAMUSCULAR; INTRAVENOUS at 18:41

## 2018-04-18 RX ADMIN — INSULIN LISPRO 7 UNITS: 100 INJECTION, SOLUTION INTRAVENOUS; SUBCUTANEOUS at 13:41

## 2018-04-18 RX ADMIN — ENOXAPARIN SODIUM 30 MG: 30 INJECTION SUBCUTANEOUS at 09:42

## 2018-04-18 RX ADMIN — CARVEDILOL 12.5 MG: 12.5 TABLET, FILM COATED ORAL at 17:23

## 2018-04-18 RX ADMIN — ONDANSETRON 4 MG: 2 INJECTION INTRAMUSCULAR; INTRAVENOUS at 18:41

## 2018-04-18 RX ADMIN — Medication 10 ML: at 20:15

## 2018-04-18 RX ADMIN — GLIMEPIRIDE 1 MG: 1 TABLET ORAL at 07:14

## 2018-04-18 RX ADMIN — Medication 400 MG: at 09:42

## 2018-04-18 RX ADMIN — ACETAMINOPHEN 650 MG: 325 TABLET, FILM COATED ORAL at 20:15

## 2018-04-18 RX ADMIN — CLOPIDOGREL BISULFATE 75 MG: 75 TABLET ORAL at 09:42

## 2018-04-18 RX ADMIN — LEVOTHYROXINE SODIUM 88 MCG: 88 TABLET ORAL at 07:14

## 2018-04-18 RX ADMIN — INSULIN LISPRO 2 UNITS: 100 INJECTION, SOLUTION INTRAVENOUS; SUBCUTANEOUS at 21:49

## 2018-04-18 RX ADMIN — Medication 10 ML: at 07:14

## 2018-04-18 RX ADMIN — VITAMIN D, TAB 1000IU (100/BT) 1000 UNITS: 25 TAB at 09:41

## 2018-04-18 RX ADMIN — SITAGLIPTIN 50 MG: 25 TABLET, FILM COATED ORAL at 09:42

## 2018-04-18 RX ADMIN — ACETAMINOPHEN 650 MG: 325 TABLET, FILM COATED ORAL at 04:36

## 2018-04-18 RX ADMIN — Medication 10 ML: at 13:41

## 2018-04-18 RX ADMIN — THERA TABS 1 TABLET: TAB at 09:41

## 2018-04-18 RX ADMIN — INSULIN LISPRO 3 UNITS: 100 INJECTION, SOLUTION INTRAVENOUS; SUBCUTANEOUS at 17:13

## 2018-04-18 RX ADMIN — CARVEDILOL 25 MG: 12.5 TABLET, FILM COATED ORAL at 09:42

## 2018-04-18 RX ADMIN — Medication 1 CAPSULE: at 09:41

## 2018-04-18 RX ADMIN — FOLIC ACID 1 MG: 1 TABLET ORAL at 09:42

## 2018-04-18 RX ADMIN — SODIUM CHLORIDE 500 ML: 900 INJECTION, SOLUTION INTRAVENOUS at 23:45

## 2018-04-18 NOTE — PROGRESS NOTES
[facility]   [address] [facility], [address] [place] [address] [address], [facility]  Phone: [phone]   [phone]       Nephrology Progress Note  Erica Campos     1935     [id number]  Date of Admission : 4/16/2018 04/18/18    CC: Follow up for JEROD        Assessment and Plan   JEROD on CKD   - 2/2 Acute MI, depressed LV function    - Renal US : showed Left Kidney stone,Non Obstructing. Shows possible liver met ?   - GFR improving w/ improving UOP   - Ok for cardiac cath       CKD Stage III :  - baseline Cr 0.9-1 mg/dl lately   - Cr has steadily increased from 0.6 to 0.9 over the last few years and may be related to the time when she was on Jardiance      NSTEMI :  - Echo shows EF 35-45%, mod LVH, Severe Hypokinesis of apex and moderate Hypokinesis of anteroseptal wall  - cath plans per Dr Maria De Jesus Nayak      Acute Systolic CHF: 2/2 MI   - continue diuresis  - hold ACE/ARB     Chronic Pontine Infarcts      ? Brain Mets on MRI : hx of colon Ca      Interval History:  Seen and examined   She has no CP/N/V/abdo pain   Renal US noted, but liver lesion not discussed w/ opt and family not at bedside     Review of Systems: Pertinent items are noted in HPI.     Current Medications:   Current Facility-Administered Medications   Medication Dose Route Frequency    ascorbic acid (vitamin C) (VITAMIN C) tablet 500 mg  500 mg Oral DAILY    aspirin delayed-release tablet 81 mg  81 mg Oral DAILY    cholecalciferol (VITAMIN D3) tablet 1,000 Units  1,000 Units Oral DAILY    clopidogrel (PLAVIX) tablet 75 mg  75 mg Oral DAILY    folic acid (FOLVITE) tablet 1 mg  1 mg Oral DAILY    glimepiride (AMARYL) tablet 1 mg  1 mg Oral 7am    magnesium oxide (MAG-OX) tablet 400 mg  400 mg Oral DAILY    therapeutic multivitamin (THERAGRAN) tablet 1 Tab  1 Tab Oral DAILY    fish oil-omega-3 fatty acids 340-1,000 mg capsule 1 Cap  1 Cap Oral DAILY    SITagliptin (JANUVIA) tablet tab 50 mg  50 mg Oral DAILY    sodium chloride (NS) flush 5-10 mL  5-10 mL IntraVENous Q8H    sodium chloride (NS) flush 5-10 mL  5-10 mL IntraVENous PRN    acetaminophen (TYLENOL) tablet 650 mg  650 mg Oral Q4H PRN    ondansetron (ZOFRAN) injection 4 mg  4 mg IntraVENous Q4H PRN    enoxaparin (LOVENOX) injection 30 mg  30 mg SubCUTAneous Q24H    furosemide (LASIX) injection 40 mg  40 mg IntraVENous BID    levothyroxine (SYNTHROID) tablet 88 mcg  88 mcg Oral ACB    carvedilol (COREG) tablet 25 mg  25 mg Oral BID      Allergies   Allergen Reactions    Statins-Hmg-Coa Reductase Inhibitors Other (comments)     Intolerant to statins     Sulfa (Sulfonamide Antibiotics) Other (comments)     syncope       Objective:  Vitals:    Vitals:    04/17/18 1916 04/17/18 2325 04/18/18 0443 04/18/18 0827   BP: 155/53 123/41 166/49 (!) 114/39   Pulse: 90 92 92 84   Resp: 20 22 21 21   Temp: 100.4 °F (38 °C) (!) 100.7 °F (38.2 °C) (!) 101.1 °F (38.4 °C) 98.7 °F (37.1 °C)   SpO2: 95% 93% 95% 98%   Weight:   57.3 kg (126 lb 5.2 oz)    Height:         Intake and Output:     04/16 1901 - 04/18 0700  In: 18 [P.O.:510]  Out: 1225 [Urine:1225]    Physical Examination:    General: Appears stated age, frail, chronically ill looking   Neck:  Supple, no mass  Resp:  Few rales B/L   CV:  RRR, no murmur  GI:  Soft, NT, + BS  Neurologic:  Non focal  Skin:  No Rash  :  Alford     []    High complexity decision making was performed  []    Patient is at high-risk of decompensation with multiple organ involvement    Lab Data Personally Reviewed: I have reviewed all the pertinent labs, microbiology data and radiology studies during assessment.     Recent Labs      04/18/18   0449  04/17/18   0240  04/16/18   0421   NA  132*  130*  135*   K  4.0  4.9  5.2*   CL  95*  97  103   CO2  27  23  23   GLU  279*  192*  301*   BUN  31*  31*  25*   CREA  1.55*  1.61*  1.36*   CA  9.7  9.3  9.6   MG   --    --   2.2   ALB  2.7*  2.5*  2.5*   SGOT  181*  102*  90*   ALT  63  83*  107*     Recent Labs      04/17/18 0240  04/16/18   0421   WBC  9.5  12.0*   HGB  9.7*  9.4*   HCT  30.7*  30.4*   PLT  478*  561*     No results found for: SDES  Lab Results   Component Value Date/Time    Culture result: NO GROWTH 2 DAYS 02/09/2018 08:18 PM     Recent Results (from the past 24 hour(s))   GLUCOSE, POC    Collection Time: 04/17/18 11:37 AM   Result Value Ref Range    Glucose (POC) 170 (H) 65 - 100 mg/dL    Performed by Agueda Conde    GLUCOSE, POC    Collection Time: 04/17/18  4:48 PM   Result Value Ref Range    Glucose (POC) 203 (H) 65 - 100 mg/dL    Performed by Nichole Erazo    EKG, 12 LEAD, INITIAL    Collection Time: 04/18/18  4:24 AM   Result Value Ref Range    Ventricular Rate 92 BPM    Atrial Rate 92 BPM    P-R Interval 148 ms    QRS Duration 86 ms    Q-T Interval 382 ms    QTC Calculation (Bezet) 472 ms    Calculated P Axis 59 degrees    Calculated R Axis 63 degrees    Calculated T Axis 144 degrees    Diagnosis       Normal sinus rhythm  ST & T wave abnormality, consider anterolateral ischemia  Prolonged QT  When compared with ECG of 17-APR-2018 07:57,  No significant change was found     TROPONIN I    Collection Time: 04/18/18  4:49 AM   Result Value Ref Range    Troponin-I, Qt. 12.70 (H) <5.84 ng/mL   METABOLIC PANEL, COMPREHENSIVE    Collection Time: 04/18/18  4:49 AM   Result Value Ref Range    Sodium 132 (L) 136 - 145 mmol/L    Potassium 4.0 3.5 - 5.1 mmol/L    Chloride 95 (L) 97 - 108 mmol/L    CO2 27 21 - 32 mmol/L    Anion gap 10 5 - 15 mmol/L    Glucose 279 (H) 65 - 100 mg/dL    BUN 31 (H) 6 - 20 MG/DL    Creatinine 1.55 (H) 0.55 - 1.02 MG/DL    BUN/Creatinine ratio 20 12 - 20      GFR est AA 39 (L) >60 ml/min/1.73m2    GFR est non-AA 32 (L) >60 ml/min/1.73m2    Calcium 9.7 8.5 - 10.1 MG/DL    Bilirubin, total 0.4 0.2 - 1.0 MG/DL    ALT (SGPT) 63 12 - 78 U/L    AST (SGOT) 181 (H) 15 - 37 U/L    Alk.  phosphatase 935 (H) 45 - 117 U/L    Protein, total 8.6 (H) 6.4 - 8.2 g/dL    Albumin 2.7 (L) 3.5 - 5.0 g/dL Globulin 5.9 (H) 2.0 - 4.0 g/dL    A-G Ratio 0.5 (L) 1.1 - 2.2     GLUCOSE, POC    Collection Time: 04/18/18  6:38 AM   Result Value Ref Range    Glucose (POC) 254 (H) 65 - 100 mg/dL    Performed by [facility]            Total time spent with patient:  xxx   min. Care Plan discussed with:  Patient     Family      RN      Consulting Physician [address] [facility],         I have reviewed the flowsheets. Chart and Pertinent Notes have been reviewed. No change in PMH ,family and social history from Consult note.       Mildrde Ocampo MD

## 2018-04-18 NOTE — PROGRESS NOTES
Problem: Falls - Risk of  Goal: *Absence of Falls  Document Yaron Fall Risk and appropriate interventions in the flowsheet. Outcome: Progressing Towards Goal  Fall Risk Interventions:  Mobility Interventions: Communicate number of staff needed for ambulation/transfer, OT consult for ADLs, Patient to call before getting OOB, PT Consult for mobility concerns, PT Consult for assist device competence, Strengthening exercises (ROM-active/passive)         Medication Interventions: Evaluate medications/consider consulting pharmacy, Assess postural VS orthostatic hypotension, Patient to call before getting OOB, Teach patient to arise slowly    Elimination Interventions: Call light in reach, Patient to call for help with toileting needs, Toileting schedule/hourly rounds             Problem: Pressure Injury - Risk of  Goal: *Prevention of pressure ulcer  Outcome: Progressing Towards Goal  Pt and sheets kept dry. Frequent turns done throughout shift. 0945: Notified Dr. Melinda Plunkett that pt's BP was 109/40. Orders received to hold the lasix and give half the dose of Coreg (12.5mg). 1245: Notified Dr. Melinda Plunkett that pt's BG is 328.     1305: Sliding scale ordered. Retook BG. CF=544. 7 units given to patient. 1315: Notified Dr. Melinda Plunkett that pt threw up her lunch and that family member would like to speak to her. Zofran administered. Physician unable to come at this time, will get in contact with son. 1930: Notified on call doctor that pt threw up after attempting to eat dinner. Bedside shift change report given to Bebo Guillory RN (oncoming nurse) by Kaiden Ricardo RN (offgoing nurse). Report included the following information SBAR, Kardex, Intake/Output, MAR, Recent Results and Cardiac Rhythm NSR.

## 2018-04-18 NOTE — PROGRESS NOTES
Bedside and Verbal shift change report given to [address] (oncoming nurse) by [address] (offgoing nurse). Report included the following information SBAR, Kardex, Intake/Output, MAR, Accordion, Recent Results and Cardiac Rhythm NSR.

## 2018-04-18 NOTE — ADT AUTH CERT NOTES
Beni Soni: Your fax says the patient is Dual but not aligned. But, Urvashi Singh indicates the member does have Medicare coverage with [employer].    Could we please get a determination on 04/16 admission please    Bradley Dalton  [phone]

## 2018-04-18 NOTE — DIABETES MGMT
DTC Progress Note    Recommendations/ Comments: Chart reviewed d/t elevated BG's. If appropriate, please consider:  - Increasing Glimepiride to 2 mg    Current hospital DM medication: Januvia 50 mg/d; Amaryl 1mg/d    Chart reviewed on Sandy Ledesma. Patient is a 82 y.o. female with known Type 2 Diabetes on Januvia 50 mg/d; Amaryl 1 mg/d; Metformin 1000 mg BID at home. A1c:   Lab Results   Component Value Date/Time    Hemoglobin A1c 7.0 (H) 04/18/2018 08:14 AM    Hemoglobin A1c 6.9 (H) 11/30/2011 08:30 AM       Recent Glucose Results:   Lab Results   Component Value Date/Time     (H) 04/18/2018 08:14 AM     (H) 04/18/2018 04:49 AM    GLUCPOC 313 (H) 04/18/2018 01:30 PM    GLUCPOC 328 (H) 04/18/2018 12:13 PM    GLUCPOC 254 (H) 04/18/2018 06:38 AM        Lab Results   Component Value Date/Time    Creatinine 1.63 (H) 04/18/2018 08:14 AM     Estimated Creatinine Clearance: 20.4 mL/min (based on Cr of 1.63). Active Orders   Diet    DIET CARDIAC Regular; Consistent Carb 1800kcal    DIET NPO Except Meds        PO intake:   Patient Vitals for the past 72 hrs:   % Diet Eaten   04/16/18 2000 0 %       Will continue to follow as needed.     Thank you  Jerzy Oden, MS, RN, CDE

## 2018-04-18 NOTE — PROGRESS NOTES
Discussed with son. They would like to delay cardiac cath, I concur. Can proceed with bone scan tomorrow.

## 2018-04-18 NOTE — PROGRESS NOTES
Hospitalist Progress Note            Daily Progress Note: 2018    Assessment/Plan:   1. NSTEMI - Appreciate cardiology help; continue ASA, Plavix and Coreg as per cardiology; ?treatment dose Lovenox, but will defer to cardiology; planning for left heart cath on .  2. Acute systolic CHF - EF 09% by echo on  (down from 60% by echo in ); continue IV diuretics as per cardiology, not ACEI sec to worsening renal function. 3. JEROD on CKD-3 :Improving; appreciate nephrology input. 4. HTN  5. DM - on Januvia and Amaryl  6. H/o hypothyroidism - continue Synthroid  7. GERD  8. H/o colon CA 25 years ago - according to son, there is a work-up planned with [facility] regarding the possibility of recurrent CA (based on lesions seen in the calvarium on MRI - 3/19)    DISPO: keep on IMCU         Subjective:   1. Feels OK. Seems to be breathing better, no chest pain. Overnight events discussed with nursing. Review of Systems:   No c/o CP. Sleepy. Objective:     Visit Vitals    /47 (BP 1 Location: Left arm, BP Patient Position: At rest)    Pulse 94    Temp 99.5 °F (37.5 °C)    Resp 29    Ht 5' 1\" (1.549 m)    Wt 57.3 kg (126 lb 5.2 oz)    SpO2 95%    BMI 23.87 kg/m2      O2 Device: Room air    Temp (24hrs), Av.1 °F (37.8 °C), Min:98.7 °F (37.1 °C), Max:101.1 °F (38.4 °C)            1901 -  0700  In: 510 [P.O.:510]  Out: 1225 [Urine:1225]    EXAM:  General: WD, WN. Alert, cooperative, no acute distress    HEENT: NC, atraumatic. PERRL, EOMI. Anicteric sclerae. Neck:   Supple, trachea midline  Lungs:  Bibasilar crackles. No Wheezing/Rhonchi/Rales. Heart:  Regular rhythm,  No murmur (), No Rubs, No Gallops  Abdomen: Soft, Non distended, Non tender.  +Bowel sounds,  Extremities: No c/c/e  Neurologic:   Alert and oriented X 3. No acute neurological distress   Psych:   Good insight. Not anxious nor agitated.         Data Review:     Recent Results (from the past 24 hour(s)) GLUCOSE, POC    Collection Time: 04/17/18  4:48 PM   Result Value Ref Range    Glucose (POC) 203 (H) 65 - 100 mg/dL    Performed by Odilia Bhatti    EKG, 12 LEAD, INITIAL    Collection Time: 04/18/18  4:24 AM   Result Value Ref Range    Ventricular Rate 92 BPM    Atrial Rate 92 BPM    P-R Interval 148 ms    QRS Duration 86 ms    Q-T Interval 382 ms    QTC Calculation (Bezet) 472 ms    Calculated P Axis 59 degrees    Calculated R Axis 63 degrees    Calculated T Axis 144 degrees    Diagnosis       Normal sinus rhythm  ST & T wave abnormality, consider anterolateral ischemia    When compared with ECG of 17-APR-2018 07:57,  No significant change was found  Confirmed by Violeta Trevino M.D., Darrick Williamson ([id number]) on 4/18/2018 10:30:03 AM     TROPONIN I    Collection Time: 04/18/18  4:49 AM   Result Value Ref Range    Troponin-I, Qt. 12.70 (H) <8.20 ng/mL   METABOLIC PANEL, COMPREHENSIVE    Collection Time: 04/18/18  4:49 AM   Result Value Ref Range    Sodium 132 (L) 136 - 145 mmol/L    Potassium 4.0 3.5 - 5.1 mmol/L    Chloride 95 (L) 97 - 108 mmol/L    CO2 27 21 - 32 mmol/L    Anion gap 10 5 - 15 mmol/L    Glucose 279 (H) 65 - 100 mg/dL    BUN 31 (H) 6 - 20 MG/DL    Creatinine 1.55 (H) 0.55 - 1.02 MG/DL    BUN/Creatinine ratio 20 12 - 20      GFR est AA 39 (L) >60 ml/min/1.73m2    GFR est non-AA 32 (L) >60 ml/min/1.73m2    Calcium 9.7 8.5 - 10.1 MG/DL    Bilirubin, total 0.4 0.2 - 1.0 MG/DL    ALT (SGPT) 63 12 - 78 U/L    AST (SGOT) 181 (H) 15 - 37 U/L    Alk.  phosphatase 935 (H) 45 - 117 U/L    Protein, total 8.6 (H) 6.4 - 8.2 g/dL    Albumin 2.7 (L) 3.5 - 5.0 g/dL    Globulin 5.9 (H) 2.0 - 4.0 g/dL    A-G Ratio 0.5 (L) 1.1 - 2.2     GLUCOSE, POC    Collection Time: 04/18/18  6:38 AM   Result Value Ref Range    Glucose (POC) 254 (H) 65 - 100 mg/dL    Performed by Lorena Organ   PCT    CBC WITH AUTOMATED DIFF    Collection Time: 04/18/18  8:14 AM   Result Value Ref Range    WBC 10.4 3.6 - 11.0 K/uL    RBC 3.30 (L) 3.80 - 5.20 M/uL    HGB 9.6 (L) 11.5 - 16.0 g/dL    HCT 28.8 (L) 35.0 - 47.0 %    MCV 87.3 80.0 - 99.0 FL    MCH 29.1 26.0 - 34.0 PG    MCHC 33.3 30.0 - 36.5 g/dL    RDW 22.8 (H) 11.5 - 14.5 %    PLATELET 198 505 - 640 K/uL    MPV 9.7 8.9 - 12.9 FL    NRBC 0.0 0  WBC    ABSOLUTE NRBC 0.00 0.00 - 0.01 K/uL    NEUTROPHILS 67 32 - 75 %    LYMPHOCYTES 12 12 - 49 %    MONOCYTES 19 (H) 5 - 13 %    EOSINOPHILS 1 0 - 7 %    BASOPHILS 1 0 - 1 %    IMMATURE GRANULOCYTES 0 %    ABS. NEUTROPHILS 7.0 1.8 - 8.0 K/UL    ABS. LYMPHOCYTES 1.2 0.8 - 3.5 K/UL    ABS. MONOCYTES 2.0 (H) 0.0 - 1.0 K/UL    ABS. EOSINOPHILS 0.1 0.0 - 0.4 K/UL    ABS. BASOPHILS 0.1 0.0 - 0.1 K/UL    ABS. IMM.  GRANS. 0.0 K/UL    DF MANUAL      PLATELET COMMENTS CLUMPED PLATELETS      RBC COMMENTS ANISOCYTOSIS  2+        RBC COMMENTS OVALOCYTES  PRESENT       RENAL FUNCTION PANEL    Collection Time: 04/18/18  8:14 AM   Result Value Ref Range    Sodium 133 (L) 136 - 145 mmol/L    Potassium 4.0 3.5 - 5.1 mmol/L    Chloride 95 (L) 97 - 108 mmol/L    CO2 27 21 - 32 mmol/L    Anion gap 11 5 - 15 mmol/L    Glucose 268 (H) 65 - 100 mg/dL    BUN 30 (H) 6 - 20 MG/DL    Creatinine 1.63 (H) 0.55 - 1.02 MG/DL    BUN/Creatinine ratio 18 12 - 20      GFR est AA 37 (L) >60 ml/min/1.73m2    GFR est non-AA 30 (L) >60 ml/min/1.73m2    Calcium 9.3 8.5 - 10.1 MG/DL    Phosphorus 3.5 2.6 - 4.7 MG/DL    Albumin 2.6 (L) 3.5 - 5.0 g/dL   HEMOGLOBIN A1C WITH EAG    Collection Time: 04/18/18  8:14 AM   Result Value Ref Range    Hemoglobin A1c 7.0 (H) 4.2 - 6.3 %    Est. average glucose 154 mg/dL   GLUCOSE, POC    Collection Time: 04/18/18 12:13 PM   Result Value Ref Range    Glucose (POC) 328 (H) 65 - 100 mg/dL    Performed by Lexi Nunez    GLUCOSE, POC    Collection Time: 04/18/18  1:30 PM   Result Value Ref Range    Glucose (POC) 313 (H) 65 - 100 mg/dL    Performed by Raghavendra Conde        Principal Problem:    SOB (shortness of breath) (4/16/2018)        Medications reviewed  Current Facility-Administered Medications   Medication Dose Route Frequency    glucose chewable tablet 16 g  4 Tab Oral PRN    dextrose (D50W) injection syrg 12.5-25 g  12.5-25 g IntraVENous PRN    glucagon (GLUCAGEN) injection 1 mg  1 mg IntraMUSCular PRN    insulin lispro (HUMALOG) injection   SubCUTAneous AC&HS    ascorbic acid (vitamin C) (VITAMIN C) tablet 500 mg  500 mg Oral DAILY    aspirin delayed-release tablet 81 mg  81 mg Oral DAILY    cholecalciferol (VITAMIN D3) tablet 1,000 Units  1,000 Units Oral DAILY    clopidogrel (PLAVIX) tablet 75 mg  75 mg Oral DAILY    folic acid (FOLVITE) tablet 1 mg  1 mg Oral DAILY    glimepiride (AMARYL) tablet 1 mg  1 mg Oral 7am    magnesium oxide (MAG-OX) tablet 400 mg  400 mg Oral DAILY    therapeutic multivitamin (THERAGRAN) tablet 1 Tab  1 Tab Oral DAILY    fish oil-omega-3 fatty acids 340-1,000 mg capsule 1 Cap  1 Cap Oral DAILY    SITagliptin (JANUVIA) tablet tab 50 mg  50 mg Oral DAILY    sodium chloride (NS) flush 5-10 mL  5-10 mL IntraVENous Q8H    sodium chloride (NS) flush 5-10 mL  5-10 mL IntraVENous PRN    acetaminophen (TYLENOL) tablet 650 mg  650 mg Oral Q4H PRN    ondansetron (ZOFRAN) injection 4 mg  4 mg IntraVENous Q4H PRN    enoxaparin (LOVENOX) injection 30 mg  30 mg SubCUTAneous Q24H    furosemide (LASIX) injection 40 mg  40 mg IntraVENous BID    levothyroxine (SYNTHROID) tablet 88 mcg  88 mcg Oral ACB    carvedilol (COREG) tablet 25 mg  25 mg Oral BID       DVT prophylaxis: Lovenox SC    Total time spent with patient: 30 minutes    Thony Hernández MD

## 2018-04-18 NOTE — PROGRESS NOTES
UOP ~ 1.1 L   Labs ordered   Will see her after labs       [facility]  Office :[phone]  Fax: [phone]

## 2018-04-18 NOTE — PROGRESS NOTES
0800 Bedside and Verbal shift change report given to Wallace Iqbal RN (oncoming nurse) by Sade Bartlett RN (offgoing nurse). Report included the following information SBAR, Kardex, Intake/Output, MAR, Recent Results and Cardiac Rhythm NSR .

## 2018-04-18 NOTE — PROGRESS NOTES
Cardiology Progress Note            Admit Date: 4/16/2018  Admit Diagnosis: SOB (shortness of breath)  Date: 4/18/2018     Time: 2:01 PM    HPI: 82 y.o. Female with new diagnosis of CHF. Presented with chief c/o of SOB, found to have pulmonary edema and EF 40% on TTE. Troponin elevated and trending up, EKG with poor R wave progression and now inverted t waves   In anterior leads. Pt clinical status also complicated by elevated creatinine requiring that LHC be postponed. Assessment and Plan     1. NSTEMI:  Troponin trending down 12.7 today  from 18.9    -no chest pain  -4/18 12 lead EKG with t wave inversions anterior leads, now more prominent in V5  -TTE:  EF 40%, severe hypokinesis of apical wals, mod hypk of basal mid anteroseptal wall. Mod LVH, Mild-mod MR, mild TR  -Continue ASA, coreg, plavix  -Statin intolerant additionally LFTs elevated  -Plan for [facility] tomorrow at 7AM    2. Cardiomyopathy/Acute HFrEF, new:  EF 40% NYHA IV on admission. Suspect NYHA III today   -improved clinically but still appears to be in failure  -Continue Coreg  -No Ace-I due to elevated creatinine  -Lasix 40 mg IV BID    3. JEROD on CKD:   creatinine 1.63-1.55 on today's labs from 1.61.   -Baseline creat 0.9-1 mg/dl  -Renal US with nonobstructing L kidney stone. And a hepatic lesion  -Nephrology following    4. Anemia: hgb 9.7 4/18  -management per renal/primary team    5. Hx of essential HTN; bp currently controlled  -Continue coreg    6. Fever:  Tmax 101.1 overnight, now normalized  -CBC ordered  -Monitor/management per primary team.    7. Elevated LFTs:  Trending up today.  -No statin   -Management/workup per primary team.    8. Hx of Colon cancer: Had findings on MRI concerning for mets. -Outpt bone scan was planned per chart. Still with bibasilar crackles, no chest pain. Troponin now trending down.   Creatinine Pt is on schedule for LHC at [address] [place] [address].  Alexander to see this afternoon. Berenice Clemente. MARIETTA [place] 4/18/2018 3:20 PM     Cardiology Attending:Patient seen and examined. I agree with NP assessment and plans. Cath at 0700 tomorrow unless kidney function worse. Vomited today. Testing suggests metastatic disease will get Oncology to see. Mariluz Walker MD 4/18/2018 1:28 PM         Subjective:   Darrick England denies chest pain, abdominal pain, nausea. States she feels better, SOB much improved, says she is able to lie flat. Objective:      Physical Exam:                Visit Vitals    /49 (BP 1 Location: Left arm, BP Patient Position: At rest;Supine)    Pulse 92    Temp (!) 101.1 °F (38.4 °C)    Resp 21    Ht 5' 1\" (1.549 m)    Wt 57.3 kg (126 lb 5.2 oz)    SpO2 95%    BMI 23.87 kg/m2          General Appearance:   Well developed, elderly alert female in no acute distress. Ears/Nose/Mouth/Throat:    Hearing grossly normal.         Neck:  Supple. Chest:    Lungs with bibasilar crackles. Off O2   Cardiovascular:    Regular rate and rhythm, S1, S2 normal, no murmur. Abdomen:    Soft, non-tender, bowel sounds are active. Extremities:  No edema bilaterally. Skin:  Warm and dry.      Telemetry: NSR          Data Review:    Labs:    Recent Results (from the past 24 hour(s))   GLUCOSE, POC    Collection Time: 04/17/18 11:37 AM   Result Value Ref Range    Glucose (POC) 170 (H) 65 - 100 mg/dL    Performed by Rowena Conde    GLUCOSE, POC    Collection Time: 04/17/18  4:48 PM   Result Value Ref Range    Glucose (POC) 203 (H) 65 - 100 mg/dL    Performed by Avril Dewey    EKG, 12 LEAD, INITIAL    Collection Time: 04/18/18  4:24 AM   Result Value Ref Range    Ventricular Rate 92 BPM    Atrial Rate 92 BPM    P-R Interval 148 ms    QRS Duration 86 ms    Q-T Interval 382 ms    QTC Calculation (Bezet) 472 ms    Calculated P Axis 59 degrees    Calculated R Axis 63 degrees    Calculated T Axis 144 degrees    Diagnosis       Normal sinus rhythm  ST & T wave abnormality, consider anterolateral ischemia  Prolonged QT  When compared with ECG of 17-APR-2018 07:57,  No significant change was found     TROPONIN I    Collection Time: 04/18/18  4:49 AM   Result Value Ref Range    Troponin-I, Qt. 12.70 (H) <0.93 ng/mL   METABOLIC PANEL, COMPREHENSIVE    Collection Time: 04/18/18  4:49 AM   Result Value Ref Range    Sodium 132 (L) 136 - 145 mmol/L    Potassium 4.0 3.5 - 5.1 mmol/L    Chloride 95 (L) 97 - 108 mmol/L    CO2 27 21 - 32 mmol/L    Anion gap 10 5 - 15 mmol/L    Glucose 279 (H) 65 - 100 mg/dL    BUN 31 (H) 6 - 20 MG/DL    Creatinine 1.55 (H) 0.55 - 1.02 MG/DL    BUN/Creatinine ratio 20 12 - 20      GFR est AA 39 (L) >60 ml/min/1.73m2    GFR est non-AA 32 (L) >60 ml/min/1.73m2    Calcium 9.7 8.5 - 10.1 MG/DL    Bilirubin, total 0.4 0.2 - 1.0 MG/DL    ALT (SGPT) 63 12 - 78 U/L    AST (SGOT) 181 (H) 15 - 37 U/L    Alk.  phosphatase 935 (H) 45 - 117 U/L    Protein, total 8.6 (H) 6.4 - 8.2 g/dL    Albumin 2.7 (L) 3.5 - 5.0 g/dL    Globulin 5.9 (H) 2.0 - 4.0 g/dL    A-G Ratio 0.5 (L) 1.1 - 2.2     GLUCOSE, POC    Collection Time: 04/18/18  6:38 AM   Result Value Ref Range    Glucose (POC) 254 (H) 65 - 100 mg/dL    Performed by Mona Clement   [facility]           Radiology:        Current Facility-Administered Medications   Medication Dose Route Frequency    ascorbic acid (vitamin C) (VITAMIN C) tablet 500 mg  500 mg Oral DAILY    aspirin delayed-release tablet 81 mg  81 mg Oral DAILY    cholecalciferol (VITAMIN D3) tablet 1,000 Units  1,000 Units Oral DAILY    clopidogrel (PLAVIX) tablet 75 mg  75 mg Oral DAILY    folic acid (FOLVITE) tablet 1 mg  1 mg Oral DAILY    glimepiride (AMARYL) tablet 1 mg  1 mg Oral 7am    magnesium oxide (MAG-OX) tablet 400 mg  400 mg Oral DAILY    therapeutic multivitamin (THERAGRAN) tablet 1 Tab  1 Tab Oral DAILY    fish oil-omega-3 fatty acids 340-1,000 mg capsule 1 Cap  1 Cap Oral DAILY    SITagliptin (JANUVIA) tablet tab 50 mg 50 mg Oral DAILY    sodium chloride (NS) flush 5-10 mL  5-10 mL IntraVENous Q8H    sodium chloride (NS) flush 5-10 mL  5-10 mL IntraVENous PRN    acetaminophen (TYLENOL) tablet 650 mg  650 mg Oral Q4H PRN    ondansetron (ZOFRAN) injection 4 mg  4 mg IntraVENous Q4H PRN    enoxaparin (LOVENOX) injection 30 mg  30 mg SubCUTAneous Q24H    furosemide (LASIX) injection 40 mg  40 mg IntraVENous BID    levothyroxine (SYNTHROID) tablet 88 mcg  88 mcg Oral ACB    carvedilol (COREG) tablet 25 mg  25 mg Oral BID          Ingrid Miranda.  MARIETTA [place]     [facility] [address] [place] [address], [address] [place] [address] [address]   Radha [place]   [phone]

## 2018-04-18 NOTE — TELEPHONE ENCOUNTER
Spoke with pt's son and told him that we are unaware of any interactions with the Enbrel and advised him that it was ok for them to restart the Enbrel. He stated an understanding and also wanted to let us know that she is currently in the hospital at [facility]. I told Dr. Fiaza Kenny so that he is aware also.

## 2018-04-18 NOTE — PROGRESS NOTES
Spiritual Care Partner Volunteer visited patient in Rm 407 on 4/18/18.   Documented by:  Chaplain Fraga MDiv, MS, [place] [id number] PRAY ([id number])

## 2018-04-19 ENCOUNTER — APPOINTMENT (OUTPATIENT)
Dept: GENERAL RADIOLOGY | Age: 83
DRG: 853 | End: 2018-04-19
Attending: FAMILY MEDICINE
Payer: MEDICARE

## 2018-04-19 ENCOUNTER — APPOINTMENT (OUTPATIENT)
Dept: CARDIAC CATH/INVASIVE PROCEDURES | Age: 83
DRG: 853 | End: 2018-04-19
Attending: INTERNAL MEDICINE
Payer: MEDICARE

## 2018-04-19 LAB
ALBUMIN SERPL-MCNC: 2.2 G/DL (ref 3.5–5)
ALBUMIN/GLOB SERPL: 0.5 {RATIO} (ref 1.1–2.2)
ALP SERPL-CCNC: 533 U/L (ref 45–117)
ALT SERPL-CCNC: 29 U/L (ref 12–78)
ANION GAP SERPL CALC-SCNC: 10 MMOL/L (ref 5–15)
ANION GAP SERPL CALC-SCNC: 7 MMOL/L (ref 5–15)
AST SERPL-CCNC: 56 U/L (ref 15–37)
BASOPHILS # BLD: 0 K/UL (ref 0–0.1)
BASOPHILS # BLD: 0 K/UL (ref 0–0.1)
BASOPHILS NFR BLD: 0 % (ref 0–1)
BASOPHILS NFR BLD: 0 % (ref 0–1)
BILIRUB SERPL-MCNC: 0.6 MG/DL (ref 0.2–1)
BLOOD GROUP ANTIBODIES SERPL: NORMAL
BUN SERPL-MCNC: 32 MG/DL (ref 6–20)
BUN SERPL-MCNC: 35 MG/DL (ref 6–20)
BUN/CREAT SERPL: 20 (ref 12–20)
BUN/CREAT SERPL: 21 (ref 12–20)
CALCIUM SERPL-MCNC: 8.2 MG/DL (ref 8.5–10.1)
CALCIUM SERPL-MCNC: 8.6 MG/DL (ref 8.5–10.1)
CHLORIDE SERPL-SCNC: 100 MMOL/L (ref 97–108)
CHLORIDE SERPL-SCNC: 99 MMOL/L (ref 97–108)
CO2 SERPL-SCNC: 25 MMOL/L (ref 21–32)
CO2 SERPL-SCNC: 27 MMOL/L (ref 21–32)
CREAT SERPL-MCNC: 1.53 MG/DL (ref 0.55–1.02)
CREAT SERPL-MCNC: 1.78 MG/DL (ref 0.55–1.02)
DAT POLY-SP REAG RBC QL: NORMAL
DIFFERENTIAL METHOD BLD: ABNORMAL
DIFFERENTIAL METHOD BLD: ABNORMAL
EOSINOPHIL # BLD: 0 K/UL (ref 0–0.4)
EOSINOPHIL # BLD: 0.1 K/UL (ref 0–0.4)
EOSINOPHIL NFR BLD: 0 % (ref 0–7)
EOSINOPHIL NFR BLD: 1 % (ref 0–7)
ERYTHROCYTE [DISTWIDTH] IN BLOOD BY AUTOMATED COUNT: 22.6 % (ref 11.5–14.5)
ERYTHROCYTE [DISTWIDTH] IN BLOOD BY AUTOMATED COUNT: 23 % (ref 11.5–14.5)
FERRITIN SERPL-MCNC: 424 NG/ML (ref 8–252)
GLOBULIN SER CALC-MCNC: 4.5 G/DL (ref 2–4)
GLUCOSE BLD STRIP.AUTO-MCNC: 123 MG/DL (ref 65–100)
GLUCOSE BLD STRIP.AUTO-MCNC: 137 MG/DL (ref 65–100)
GLUCOSE BLD STRIP.AUTO-MCNC: 141 MG/DL (ref 65–100)
GLUCOSE BLD STRIP.AUTO-MCNC: 143 MG/DL (ref 65–100)
GLUCOSE BLD STRIP.AUTO-MCNC: 156 MG/DL (ref 65–100)
GLUCOSE BLD STRIP.AUTO-MCNC: 234 MG/DL (ref 65–100)
GLUCOSE SERPL-MCNC: 123 MG/DL (ref 65–100)
GLUCOSE SERPL-MCNC: 172 MG/DL (ref 65–100)
HAPTOGLOB SERPL-MCNC: 228 MG/DL (ref 30–200)
HCT VFR BLD AUTO: 23.8 % (ref 35–47)
HCT VFR BLD AUTO: 27.8 % (ref 35–47)
HGB BLD-MCNC: 7.6 G/DL (ref 11.5–16)
HGB BLD-MCNC: 8.9 G/DL (ref 11.5–16)
IMM GRANULOCYTES # BLD: 0 K/UL
IMM GRANULOCYTES # BLD: 0.1 K/UL (ref 0–0.04)
IMM GRANULOCYTES NFR BLD AUTO: 0 %
IMM GRANULOCYTES NFR BLD AUTO: 1 % (ref 0–0.5)
IRON SATN MFR SERPL: 5 % (ref 20–50)
IRON SERPL-MCNC: 12 UG/DL (ref 35–150)
LACTATE SERPL-SCNC: 0.9 MMOL/L (ref 0.4–2)
LACTATE SERPL-SCNC: 1.2 MMOL/L (ref 0.4–2)
LDH SERPL L TO P-CCNC: 341 U/L (ref 81–246)
LYMPHOCYTES # BLD: 1.1 K/UL (ref 0.8–3.5)
LYMPHOCYTES # BLD: 1.7 K/UL (ref 0.8–3.5)
LYMPHOCYTES NFR BLD: 14 % (ref 12–49)
LYMPHOCYTES NFR BLD: 8 % (ref 12–49)
MCH RBC QN AUTO: 28.7 PG (ref 26–34)
MCH RBC QN AUTO: 28.9 PG (ref 26–34)
MCHC RBC AUTO-ENTMCNC: 31.9 G/DL (ref 30–36.5)
MCHC RBC AUTO-ENTMCNC: 32 G/DL (ref 30–36.5)
MCV RBC AUTO: 89.8 FL (ref 80–99)
MCV RBC AUTO: 90.3 FL (ref 80–99)
MONOCYTES # BLD: 2.1 K/UL (ref 0–1)
MONOCYTES # BLD: 3 K/UL (ref 0–1)
MONOCYTES NFR BLD: 16 % (ref 5–13)
MONOCYTES NFR BLD: 24 % (ref 5–13)
NEUTS SEG # BLD: 10 K/UL (ref 1.8–8)
NEUTS SEG # BLD: 7.5 K/UL (ref 1.8–8)
NEUTS SEG NFR BLD: 61 % (ref 32–75)
NEUTS SEG NFR BLD: 75 % (ref 32–75)
NRBC # BLD: 0 K/UL (ref 0–0.01)
NRBC # BLD: 0.03 K/UL (ref 0–0.01)
NRBC BLD-RTO: 0 PER 100 WBC
NRBC BLD-RTO: 0.2 PER 100 WBC
PLATELET # BLD AUTO: 235 K/UL (ref 150–400)
PLATELET # BLD AUTO: 289 K/UL (ref 150–400)
PLATELET COMMENTS,PCOM: ABNORMAL
PMV BLD AUTO: 10 FL (ref 8.9–12.9)
PMV BLD AUTO: 9.3 FL (ref 8.9–12.9)
POTASSIUM SERPL-SCNC: 3.3 MMOL/L (ref 3.5–5.1)
POTASSIUM SERPL-SCNC: 3.4 MMOL/L (ref 3.5–5.1)
PROT SERPL-MCNC: 6.7 G/DL (ref 6.4–8.2)
RBC # BLD AUTO: 2.65 M/UL (ref 3.8–5.2)
RBC # BLD AUTO: 3.08 M/UL (ref 3.8–5.2)
RBC MORPH BLD: ABNORMAL
RETICS # AUTO: 0.08 M/UL (ref 0.02–0.08)
RETICS/RBC NFR AUTO: 2.8 % (ref 0.7–2.1)
SERVICE CMNT-IMP: ABNORMAL
SODIUM SERPL-SCNC: 134 MMOL/L (ref 136–145)
SODIUM SERPL-SCNC: 134 MMOL/L (ref 136–145)
TIBC SERPL-MCNC: 232 UG/DL (ref 250–450)
WBC # BLD AUTO: 12.3 K/UL (ref 3.6–11)
WBC # BLD AUTO: 13.3 K/UL (ref 3.6–11)

## 2018-04-19 PROCEDURE — 87077 CULTURE AEROBIC IDENTIFY: CPT | Performed by: HOSPITALIST

## 2018-04-19 PROCEDURE — 83010 ASSAY OF HAPTOGLOBIN QUANT: CPT | Performed by: INTERNAL MEDICINE

## 2018-04-19 PROCEDURE — 83605 ASSAY OF LACTIC ACID: CPT | Performed by: FAMILY MEDICINE

## 2018-04-19 PROCEDURE — 86301 IMMUNOASSAY TUMOR CA 19-9: CPT | Performed by: INTERNAL MEDICINE

## 2018-04-19 PROCEDURE — 74011250636 HC RX REV CODE- 250/636: Performed by: INTERNAL MEDICINE

## 2018-04-19 PROCEDURE — 86880 COOMBS TEST DIRECT: CPT | Performed by: INTERNAL MEDICINE

## 2018-04-19 PROCEDURE — 87040 BLOOD CULTURE FOR BACTERIA: CPT | Performed by: FAMILY MEDICINE

## 2018-04-19 PROCEDURE — 83605 ASSAY OF LACTIC ACID: CPT | Performed by: INTERNAL MEDICINE

## 2018-04-19 PROCEDURE — 74011636637 HC RX REV CODE- 636/637: Performed by: HOSPITALIST

## 2018-04-19 PROCEDURE — 82728 ASSAY OF FERRITIN: CPT | Performed by: INTERNAL MEDICINE

## 2018-04-19 PROCEDURE — 82105 ALPHA-FETOPROTEIN SERUM: CPT | Performed by: INTERNAL MEDICINE

## 2018-04-19 PROCEDURE — 85045 AUTOMATED RETICULOCYTE COUNT: CPT | Performed by: INTERNAL MEDICINE

## 2018-04-19 PROCEDURE — 85025 COMPLETE CBC W/AUTO DIFF WBC: CPT | Performed by: HOSPITALIST

## 2018-04-19 PROCEDURE — 87186 SC STD MICRODIL/AGAR DIL: CPT | Performed by: HOSPITALIST

## 2018-04-19 PROCEDURE — 80048 BASIC METABOLIC PNL TOTAL CA: CPT | Performed by: SPECIALIST

## 2018-04-19 PROCEDURE — 74011250637 HC RX REV CODE- 250/637: Performed by: HOSPITALIST

## 2018-04-19 PROCEDURE — 86300 IMMUNOASSAY TUMOR CA 15-3: CPT | Performed by: INTERNAL MEDICINE

## 2018-04-19 PROCEDURE — 65610000006 HC RM INTENSIVE CARE

## 2018-04-19 PROCEDURE — P9047 ALBUMIN (HUMAN), 25%, 50ML: HCPCS | Performed by: HOSPITALIST

## 2018-04-19 PROCEDURE — 77030034850

## 2018-04-19 PROCEDURE — 83540 ASSAY OF IRON: CPT | Performed by: INTERNAL MEDICINE

## 2018-04-19 PROCEDURE — 84165 PROTEIN E-PHORESIS SERUM: CPT | Performed by: INTERNAL MEDICINE

## 2018-04-19 PROCEDURE — 83883 ASSAY NEPHELOMETRY NOT SPEC: CPT | Performed by: INTERNAL MEDICINE

## 2018-04-19 PROCEDURE — 74011250637 HC RX REV CODE- 250/637: Performed by: INTERNAL MEDICINE

## 2018-04-19 PROCEDURE — 71045 X-RAY EXAM CHEST 1 VIEW: CPT

## 2018-04-19 PROCEDURE — 36415 COLL VENOUS BLD VENIPUNCTURE: CPT | Performed by: SPECIALIST

## 2018-04-19 PROCEDURE — P9047 ALBUMIN (HUMAN), 25%, 50ML: HCPCS

## 2018-04-19 PROCEDURE — 74011250636 HC RX REV CODE- 250/636: Performed by: HOSPITALIST

## 2018-04-19 PROCEDURE — 87086 URINE CULTURE/COLONY COUNT: CPT | Performed by: HOSPITALIST

## 2018-04-19 PROCEDURE — 82962 GLUCOSE BLOOD TEST: CPT

## 2018-04-19 PROCEDURE — C1751 CATH, INF, PER/CENT/MIDLINE: HCPCS

## 2018-04-19 PROCEDURE — 74011000258 HC RX REV CODE- 258: Performed by: HOSPITALIST

## 2018-04-19 PROCEDURE — 83615 LACTATE (LD) (LDH) ENZYME: CPT | Performed by: INTERNAL MEDICINE

## 2018-04-19 PROCEDURE — 80053 COMPREHEN METABOLIC PANEL: CPT | Performed by: INTERNAL MEDICINE

## 2018-04-19 PROCEDURE — 74011250636 HC RX REV CODE- 250/636

## 2018-04-19 RX ORDER — LIDOCAINE HYDROCHLORIDE 10 MG/ML
10 INJECTION INFILTRATION; PERINEURAL ONCE
Status: COMPLETED | OUTPATIENT
Start: 2018-04-19 | End: 2018-04-20

## 2018-04-19 RX ORDER — ALBUMIN HUMAN 250 G/1000ML
12.5 SOLUTION INTRAVENOUS ONCE
Status: COMPLETED | OUTPATIENT
Start: 2018-04-19 | End: 2018-04-19

## 2018-04-19 RX ORDER — FUROSEMIDE 40 MG/1
40 TABLET ORAL DAILY
Status: DISCONTINUED | OUTPATIENT
Start: 2018-04-20 | End: 2018-04-19

## 2018-04-19 RX ORDER — CARVEDILOL 3.12 MG/1
3.12 TABLET ORAL 2 TIMES DAILY
Status: DISCONTINUED | OUTPATIENT
Start: 2018-04-19 | End: 2018-04-19

## 2018-04-19 RX ORDER — LIDOCAINE HYDROCHLORIDE 10 MG/ML
INJECTION, SOLUTION EPIDURAL; INFILTRATION; INTRACAUDAL; PERINEURAL
Status: DISPENSED
Start: 2018-04-19 | End: 2018-04-20

## 2018-04-19 RX ORDER — ALBUMIN HUMAN 250 G/1000ML
25 SOLUTION INTRAVENOUS ONCE
Status: ACTIVE | OUTPATIENT
Start: 2018-04-19 | End: 2018-04-19

## 2018-04-19 RX ORDER — PHENYLEPHRINE HCL IN 0.9% NACL 30MG/250ML
10-300 PLASTIC BAG, INJECTION (ML) INTRAVENOUS
Status: DISCONTINUED | OUTPATIENT
Start: 2018-04-19 | End: 2018-04-23

## 2018-04-19 RX ORDER — SODIUM CHLORIDE 9 MG/ML
50 INJECTION, SOLUTION INTRAVENOUS CONTINUOUS
Status: DISPENSED | OUTPATIENT
Start: 2018-04-19 | End: 2018-04-21

## 2018-04-19 RX ORDER — VANCOMYCIN HYDROCHLORIDE
1250 ONCE
Status: COMPLETED | OUTPATIENT
Start: 2018-04-19 | End: 2018-04-28

## 2018-04-19 RX ORDER — NOREPINEPHRINE BITARTRATE/D5W 8 MG/250ML
2-30 PLASTIC BAG, INJECTION (ML) INTRAVENOUS
Status: DISCONTINUED | OUTPATIENT
Start: 2018-04-19 | End: 2018-04-23

## 2018-04-19 RX ORDER — ALBUMIN HUMAN 250 G/1000ML
SOLUTION INTRAVENOUS
Status: COMPLETED
Start: 2018-04-19 | End: 2018-04-26

## 2018-04-19 RX ADMIN — CLOPIDOGREL BISULFATE 75 MG: 75 TABLET ORAL at 09:10

## 2018-04-19 RX ADMIN — PIPERACILLIN SODIUM,TAZOBACTAM SODIUM 3.38 G: 3; .375 INJECTION, POWDER, FOR SOLUTION INTRAVENOUS at 17:23

## 2018-04-19 RX ADMIN — ACETAMINOPHEN 650 MG: 325 TABLET, FILM COATED ORAL at 16:05

## 2018-04-19 RX ADMIN — ENOXAPARIN SODIUM 30 MG: 30 INJECTION SUBCUTANEOUS at 12:29

## 2018-04-19 RX ADMIN — INSULIN LISPRO 2 UNITS: 100 INJECTION, SOLUTION INTRAVENOUS; SUBCUTANEOUS at 12:28

## 2018-04-19 RX ADMIN — CARVEDILOL 3.12 MG: 3.12 TABLET, FILM COATED ORAL at 09:10

## 2018-04-19 RX ADMIN — SODIUM CHLORIDE 75 ML/HR: 900 INJECTION, SOLUTION INTRAVENOUS at 01:11

## 2018-04-19 RX ADMIN — VANCOMYCIN HYDROCHLORIDE 1250 MG: 10 INJECTION, POWDER, LYOPHILIZED, FOR SOLUTION INTRAVENOUS at 22:37

## 2018-04-19 RX ADMIN — SODIUM CHLORIDE 500 ML: 900 INJECTION, SOLUTION INTRAVENOUS at 00:36

## 2018-04-19 RX ADMIN — Medication 10 ML: at 17:23

## 2018-04-19 RX ADMIN — OXYCODONE HYDROCHLORIDE AND ACETAMINOPHEN 500 MG: 500 TABLET ORAL at 09:10

## 2018-04-19 RX ADMIN — SITAGLIPTIN 50 MG: 25 TABLET, FILM COATED ORAL at 09:10

## 2018-04-19 RX ADMIN — CARVEDILOL 3.12 MG: 3.12 TABLET, FILM COATED ORAL at 17:23

## 2018-04-19 RX ADMIN — INSULIN LISPRO 2 UNITS: 100 INJECTION, SOLUTION INTRAVENOUS; SUBCUTANEOUS at 07:16

## 2018-04-19 RX ADMIN — LEVOTHYROXINE SODIUM 88 MCG: 88 TABLET ORAL at 07:15

## 2018-04-19 RX ADMIN — GLIMEPIRIDE 1 MG: 1 TABLET ORAL at 07:15

## 2018-04-19 RX ADMIN — FOLIC ACID 1 MG: 1 TABLET ORAL at 09:10

## 2018-04-19 RX ADMIN — CEFTRIAXONE 1 G: 1 INJECTION, POWDER, FOR SOLUTION INTRAMUSCULAR; INTRAVENOUS at 09:07

## 2018-04-19 RX ADMIN — Medication 10 ML: at 07:16

## 2018-04-19 RX ADMIN — VITAMIN D, TAB 1000IU (100/BT) 1000 UNITS: 25 TAB at 09:10

## 2018-04-19 RX ADMIN — Medication 10 ML: at 22:39

## 2018-04-19 RX ADMIN — Medication 1 CAPSULE: at 09:00

## 2018-04-19 RX ADMIN — THERA TABS 1 TABLET: TAB at 09:10

## 2018-04-19 RX ADMIN — Medication 400 MG: at 09:11

## 2018-04-19 RX ADMIN — ALBUMIN (HUMAN) 12.5 G: 0.25 INJECTION, SOLUTION INTRAVENOUS at 03:03

## 2018-04-19 RX ADMIN — ASPIRIN 81 MG: 81 TABLET, COATED ORAL at 09:11

## 2018-04-19 RX ADMIN — ALBUMIN (HUMAN) 12.5 G: 0.25 INJECTION, SOLUTION INTRAVENOUS at 20:08

## 2018-04-19 NOTE — PROGRESS NOTES
Hospitalist Progress Note            Daily Progress Note: 2018    Assessment/Plan:   1. Sepsis and septic shock sec to UTI: Started on IV antibiotics, follow up on pan culture. Received IVF boluses, now BP improved, continue maintenance fluids, holding lasix, decreased dose of coreg. 2. NSTEMI - Appreciate cardiology help; continue ASA, Plavix and Coreg as per cardiology; No ACEI or ARBs sec to JEROD. Cath plane per cardiology  3. Acute systolic CHF likely sec to No#1 - EF 35 - 45% by echo on  (down from 60% by echo in ); Lasix on hold sec to JEROD. 4. JEROD on CKD-3 : Worsened sec to hypotension; appreciate nephrology input. 5. HTN: BP on lower side, received bolus last night, now IVF  6. DM - on Januvia and Amaryl  7. H/o hypothyroidism - continue Synthroid  8. GERD  9. H/o colon CA 25 years ago - according to son, there is a work-up planned with [facility] regarding the possibility of recurrent CA (based on lesions seen in the calvarium on MRI - 3/19). Consulted oncology and bone scan    DISPO: keep on IMCU         Subjective:   1. Feels OK. no chest pain. Overnight events discussed with nursing. Review of Systems:   No c/o CP. Sleepy. Objective:     Visit Vitals    /40 (BP 1 Location: Right arm, BP Patient Position: At rest)    Pulse (!) 106    Temp (!) 103.2 °F (39.6 °C)    Resp 28    Ht 5' 1\" (1.549 m)    Wt 57.9 kg (127 lb 10.3 oz)    SpO2 100%    BMI 24.12 kg/m2    O2 Flow Rate (L/min): 2 l/min O2 Device: Nasal cannula    Temp (24hrs), Av.4 °F (38 °C), Min:98.1 °F (36.7 °C), Max:103.2 °F (39.6 °C)      701 - 1900  In: -   Out: 300 [Urine:300]    1901 -  0700  In: -   Out: 900 [Urine:900]    EXAM:  General: WD, WN. Alert, cooperative, no acute distress    HEENT: NC, atraumatic, EOMI. Anicteric sclerae. Neck:   Supple,   Lungs:  Bibasilar crackles. No Wheezing.   Heart:  Regular rhythm,   Abdomen: Soft, Non distended, Non tender.  +Bowel sounds,  Extremities: No c/c/e  Neurologic:   Alert and oriented X 3. No acute neurological distress   Psych:   Good insight. Not anxious nor agitated.         Data Review:     Recent Results (from the past 24 hour(s))   URINALYSIS W/MICROSCOPIC    Collection Time: 04/18/18  7:00 PM   Result Value Ref Range    Color YELLOW/STRAW      Appearance CLOUDY (A) CLEAR      Specific gravity 1.016 1.003 - 1.030      pH (UA) 6.5 5.0 - 8.0      Protein NEGATIVE  NEG mg/dL    Glucose NEGATIVE  NEG mg/dL    Ketone NEGATIVE  NEG mg/dL    Bilirubin NEGATIVE  NEG      Blood SMALL (A) NEG      Urobilinogen 0.2 0.2 - 1.0 EU/dL    Nitrites POSITIVE (A) NEG      Leukocyte Esterase MODERATE (A) NEG      WBC 10-20 0 - 4 /hpf    RBC 0-5 0 - 5 /hpf    Epithelial cells FEW FEW /lpf    Bacteria 1+ (A) NEG /hpf    Hyaline cast 0-2 0 - 5 /lpf   GLUCOSE, POC    Collection Time: 04/18/18  9:17 PM   Result Value Ref Range    Glucose (POC) 215 (H) 65 - 100 mg/dL    Performed by Jules Butterfield   PCT    GLUCOSE, POC    Collection Time: 04/18/18 11:55 PM   Result Value Ref Range    Glucose (POC) 234 (H) 65 - 100 mg/dL    Performed by Arturo Funes    METABOLIC PANEL, BASIC    Collection Time: 04/19/18  3:28 AM   Result Value Ref Range    Sodium 134 (L) 136 - 145 mmol/L    Potassium 3.4 (L) 3.5 - 5.1 mmol/L    Chloride 100 97 - 108 mmol/L    CO2 27 21 - 32 mmol/L    Anion gap 7 5 - 15 mmol/L    Glucose 172 (H) 65 - 100 mg/dL    BUN 35 (H) 6 - 20 MG/DL    Creatinine 1.78 (H) 0.55 - 1.02 MG/DL    BUN/Creatinine ratio 20 12 - 20      GFR est AA 33 (L) >60 ml/min/1.73m2    GFR est non-AA 27 (L) >60 ml/min/1.73m2    Calcium 8.6 8.5 - 10.1 MG/DL   CBC WITH AUTOMATED DIFF    Collection Time: 04/19/18  3:28 AM   Result Value Ref Range    WBC 12.3 (H) 3.6 - 11.0 K/uL    RBC 3.08 (L) 3.80 - 5.20 M/uL    HGB 8.9 (L) 11.5 - 16.0 g/dL    HCT 27.8 (L) 35.0 - 47.0 %    MCV 90.3 80.0 - 99.0 FL    MCH 28.9 26.0 - 34.0 PG    MCHC 32.0 30.0 - 36.5 g/dL    RDW 23.0 (H) 11.5 - 14.5 %    PLATELET 451 107 - 738 K/uL    MPV 9.3 8.9 - 12.9 FL    NRBC 0.2 (H) 0  WBC    ABSOLUTE NRBC 0.03 (H) 0.00 - 0.01 K/uL    NEUTROPHILS 61 32 - 75 %    LYMPHOCYTES 14 12 - 49 %    MONOCYTES 24 (H) 5 - 13 %    EOSINOPHILS 0 0 - 7 %    BASOPHILS 0 0 - 1 %    IMMATURE GRANULOCYTES 1 (H) 0.0 - 0.5 %    ABS. NEUTROPHILS 7.5 1.8 - 8.0 K/UL    ABS. LYMPHOCYTES 1.7 0.8 - 3.5 K/UL    ABS. MONOCYTES 3.0 (H) 0.0 - 1.0 K/UL    ABS. EOSINOPHILS 0.0 0.0 - 0.4 K/UL    ABS. BASOPHILS 0.0 0.0 - 0.1 K/UL    ABS. IMM.  GRANS. 0.1 (H) 0.00 - 0.04 K/UL    DF SMEAR SCANNED      PLATELET COMMENTS CLUMPED PLATELETS      RBC COMMENTS ANISOCYTOSIS  2+        RBC COMMENTS POLYCHROMASIA  1+       LACTIC ACID    Collection Time: 04/19/18  3:28 AM   Result Value Ref Range    Lactic acid 1.2 0.4 - 2.0 MMOL/L   GLUCOSE, POC    Collection Time: 04/19/18  6:56 AM   Result Value Ref Range    Glucose (POC) 156 (H) 65 - 100 mg/dL    Performed by [address] [place] [address], POC    Collection Time: 04/19/18 11:59 AM   Result Value Ref Range    Glucose (POC) 141 (H) 65 - 100 mg/dL    Performed by He Davis, POC    Collection Time: 04/19/18  4:41 PM   Result Value Ref Range    Glucose (POC) 123 (H) 65 - 100 mg/dL    Performed by Marleny Chung        Principal Problem:    SOB (shortness of breath) (4/16/2018)        Medications reviewed  Current Facility-Administered Medications   Medication Dose Route Frequency    0.9% sodium chloride infusion  75 mL/hr IntraVENous CONTINUOUS    carvedilol (COREG) tablet 3.125 mg  3.125 mg Oral BID    piperacillin-tazobactam (ZOSYN) 3.375 g in 0.9% sodium chloride (MBP/ADV) 100 mL  3.375 g IntraVENous Q8H    glucose chewable tablet 16 g  4 Tab Oral PRN    dextrose (D50W) injection syrg 12.5-25 g  12.5-25 g IntraVENous PRN    glucagon (GLUCAGEN) injection 1 mg  1 mg IntraMUSCular PRN    insulin lispro (HUMALOG) injection   SubCUTAneous AC&HS    prochlorperazine (COMPAZINE) with saline injection 5 mg  5 mg IntraVENous Q8H PRN    ascorbic acid (vitamin C) (VITAMIN C) tablet 500 mg  500 mg Oral DAILY    aspirin delayed-release tablet 81 mg  81 mg Oral DAILY    cholecalciferol (VITAMIN D3) tablet 1,000 Units  1,000 Units Oral DAILY    clopidogrel (PLAVIX) tablet 75 mg  75 mg Oral DAILY    folic acid (FOLVITE) tablet 1 mg  1 mg Oral DAILY    glimepiride (AMARYL) tablet 1 mg  1 mg Oral 7am    magnesium oxide (MAG-OX) tablet 400 mg  400 mg Oral DAILY    therapeutic multivitamin (THERAGRAN) tablet 1 Tab  1 Tab Oral DAILY    fish oil-omega-3 fatty acids 340-1,000 mg capsule 1 Cap  1 Cap Oral DAILY    SITagliptin (JANUVIA) tablet tab 50 mg  50 mg Oral DAILY    sodium chloride (NS) flush 5-10 mL  5-10 mL IntraVENous Q8H    sodium chloride (NS) flush 5-10 mL  5-10 mL IntraVENous PRN    acetaminophen (TYLENOL) tablet 650 mg  650 mg Oral Q4H PRN    ondansetron (ZOFRAN) injection 4 mg  4 mg IntraVENous Q4H PRN    enoxaparin (LOVENOX) injection 30 mg  30 mg SubCUTAneous Q24H    levothyroxine (SYNTHROID) tablet 88 mcg  88 mcg Oral ACB       DVT prophylaxis: Lovenox SC    Total time spent with patient: 30 minutes    Clement Dewitt MD

## 2018-04-19 NOTE — PROGRESS NOTES
[id number] [facility] Dr. Lyudmila Lozano paged about patient's BP 76/22. Order received for 500 mL bolus. 0030 Patient BP 85/26. Dr. Lyudmila Lozano paged. Order received for another 500 mL bolus and continuous NS infusion at 75 mL/hr.     0245  Patient's BP 87/28. Dr. Lyudmila Lozano paged. Order received for albumin. 7216 Bedside and Verbal shift change report given to Leila Root RN (oncoming nurse) by Maldonado Barrett RN (offgoing nurse). Report included the following information SBAR, Kardex, Intake/Output, MAR, Recent Results and Cardiac Rhythm NSR .

## 2018-04-19 NOTE — DIABETES MGMT
DTC Progress Note    Recommendations/ Comments: Chart reviewed d/t elevated BG's. If appropriate, please consider:  - Increasing Glimepiride to 2 mg    Current hospital DM medication: Januvia 50 mg/d; Amaryl 1mg/d and correction scale Humalog, normal sensitivity. Chart reviewed on Sandy Ledesma. Patient is a 82 y.o. female with known Type 2 Diabetes on Januvia 50 mg/d; Amaryl 1 mg/d; Metformin 1000 mg BID at home. A1c:   Lab Results   Component Value Date/Time    Hemoglobin A1c 7.0 (H) 04/18/2018 08:14 AM    Hemoglobin A1c 6.9 (H) 11/30/2011 08:30 AM       Recent Glucose Results:   Lab Results   Component Value Date/Time     (H) 04/19/2018 03:28 AM    GLUCPOC 156 (H) 04/19/2018 06:56 AM    GLUCPOC 234 (H) 04/18/2018 11:55 PM    GLUCPOC 215 (H) 04/18/2018 09:17 PM        Lab Results   Component Value Date/Time    Creatinine 1.78 (H) 04/19/2018 03:28 AM     Estimated Creatinine Clearance: 20.3 mL/min (based on Cr of 1.78). Active Orders   Diet    DIET CARDIAC Regular; Consistent Carb 1800kcal        PO intake:   Patient Vitals for the past 72 hrs:   % Diet Eaten   04/16/18 2000 0 %       Will continue to follow as needed.     Thank you  Juventino Crane RD, CDE

## 2018-04-19 NOTE — PROGRESS NOTES
Problem: General Medical Care Plan  Goal: *Vital signs within specified parameters  Outcome: Progressing Towards Goal  Patient hypotensive overnight. Received 2 500 mL boluses and albumin. Patient no longer febrile after 1 dose Tylenol. Will continue to monitor.      Patient Vitals for the past 12 hrs:   Temp Pulse Resp BP SpO2   04/19/18 0615 - 91 21 122/41 99 %   04/19/18 0600 - 90 18 97/44 100 %   04/19/18 0545 - 88 21 (!) 119/36 99 %   04/19/18 0530 - 88 23 (!) 125/34 97 %   04/19/18 0515 - 89 22 (!) 124/31 97 %   04/19/18 0500 - 89 19 (!) 127/37 99 %   04/19/18 0445 - 86 19 (!) 124/34 100 %   04/19/18 0430 - 87 19 108/47 100 %   04/19/18 0415 - 85 17 (!) 118/33 100 %   04/19/18 0400 - 82 18 (!) 103/28 100 %   04/19/18 0345 - 78 19 (!) 104/26 100 %   04/19/18 0330 - 78 19 (!) 95/28 100 %   04/19/18 0315 - 78 17 (!) 96/32 99 %   04/19/18 0300 98.1 °F (36.7 °C) 79 18 (!) 89/24 100 %   04/19/18 0245 - 77 16 (!) 89/25 98 %   04/19/18 0236 - 78 15 (!) 87/28 99 %   04/19/18 0230 - 79 16 (!) 72/26 97 %   04/19/18 0215 - 77 16 (!) 86/26 97 %   04/19/18 0200 - 80 15 (!) 95/28 100 %   04/19/18 0145 - 81 15 (!) 96/29 100 %   04/19/18 0130 - 83 15 (!) 106/32 100 %   04/19/18 0115 - 83 14 (!) 114/33 100 %   04/19/18 0100 - 80 15 (!) 95/29 100 %   04/19/18 0045 - 80 15 (!) 93/25 100 %   04/19/18 0030 - 82 16 (!) 90/24 100 %   04/19/18 0015 - 81 16 (!) 85/26 100 %   04/19/18 0000 - 88 20 (!) 96/25 100 %   04/18/18 2345 - 85 24 (!) 80/28 95 %   04/18/18 2343 - 84 21 (!) 76/22 96 %   04/18/18 2336 - 83 18 (!) 84/22 95 %   04/18/18 2334 - 84 21 (!) 75/22 96 %   04/18/18 2331 99 °F (37.2 °C) 86 17 (!) 89/27 97 %   04/18/18 2153 100.3 °F (37.9 °C) - - - -   04/18/18 1908 (!) 101.3 °F (38.5 °C) 97 27 (!) 114/38 95 %

## 2018-04-19 NOTE — PROGRESS NOTES
Day #1 of Zosyn  Indication:  UTI  Current regimen:  3.375 mg q 6 h  Abx regimen: zosyn (s/p ceftriaxone)  Recent Labs      18   0328  18   0814  18   0449  18   0240   WBC  12.3*  10.4   --   9.5   CREA  1.78*  1.63*  1.55*  1.61*   BUN  35*  30*  31*  31*     Est CrCl: 20.3 ml/min; UO: - ml/kg/hr  Temp (24hrs), Av.4 °F (38 °C), Min:98.1 °F (36.7 °C), Max:103.2 °F (39.6 °C)    Cultures:    blood-ng x 19 hours   urine- pending    Plan: Change to 3.375 mg IV q 8 h (extended -infusion dosing)

## 2018-04-19 NOTE — CONSULTS
[address]    Claudette Schofield  MR#: [id number]  : 1935  ACCOUNT #: [de-identified]   DATE OF SERVICE: 2018    HISTORY OF PRESENT ILLNESS:  The patient is an 33-year-old woman with a history of rheumatoid arthritis and recently identified congestive heart failure, who is seen after admission to [facility] with shortness of breath. This patient is being considered for cardiac catheterization; however, a recent outpatient MRI of the brain identified base of skull lesions concerning for malignancy. We were asked to see the patient regarding this finding. The patient has a recent history of weight loss, increasing shortness of breath and chronic pain secondary to her rheumatoid arthritis. She has also recently had difficulty with hearing and saw Dr. Елена Garcia for evaluation of this problem. He had ordered an MRI of her brain on  which showed the areas of focal abnormality within the calvarium bilaterally. The patient denies headache, denies any change in her rheumatoid arthritis symptoms. PAST MEDICAL HISTORY:  Significant for colon cancer diagnosed in  treated at [facility] with adjuvant chemotherapy. She also has a history of rheumatoid arthritis, anemia, diabetes, GERD, CVA with left-sided weakness, hypothyroidism, osteoporosis, hypertension. PAST SURGICAL HISTORY:  Hysterectomy, hemicolectomy, colonoscopies. She has chronic kidney disease as well. ALLERGIES:  STATINS AND SULFA. SOCIAL HISTORY:  She never smoked. She does not use alcohol. She is originally from [facility]. FAMILY HISTORY:  Her father had stomach cancer. REVIEW OF SYSTEMS:  As noted above, otherwise noncontributory. PHYSICAL EXAMINATION:  GENERAL:  She is a pleasant elderly woman in no apparent distress. VITAL SIGNS:  Her temperature is 103, pulse 106, /40, respirations 28. HEENT:  She is wearing nasal cannula oxygen. Nonicteric sclerae.   Ocular motions are intact. NECK:  Supple. HEART:  Tachycardic, I/VI systolic murmur. ABDOMEN:  Scaphoid, nontender. No organomegaly. EXTREMITIES:  No edema. NEUROLOGIC:  Grossly nonfocal.    LABORATORY DATA:  Urinalysis showed 10-20 white cells per high power field. Blood cultures dated April 18 are negative so far. Urine culture dated April 19 is pending. IMAGING: Chest x-ray dated April 16 shows mild pulmonary edema and a calcified granuloma in the right upper lobe. Ultrasound of the abdomen dated April 17 shows left renal nonobstructing calculus and an incidental 1 cm lesion within the liver. IMPRESSION:    1. 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 These are incidental findings. The patient has no evidence of bone lesion seen on chest x-ray. She has a remote history of colon cancer, which is unlikely to be the source of these lesions. Differential diagnosis includes other metastatic disease, myeloma and benign entities. Cardiology would like assurance that she does not have metastatic disease before proceeding with cardiac catheterization, and towards that end we will perform tumor markers and an MRI of the abdomen to evaluate the liver finding. Bone scan is already ordered. 2.  Anemia. This is probably secondary to her renal insufficiency plus or minus rheumatoid arthritis. However, we will perform a thorough evaluation to investigate other causes including myeloma. I have told both the patient and her son that a bone marrow aspirate and biopsy would be required to rule out myelodysplasia or occult malignancy, but we would like to hold off on ordering that study until the results of the above workup are known. We will follow with you on behalf of [employer].       MD THOM Hanley / MARU  D: 04/19/2018 17:20     T: 04/19/2018 18:06  JOB #: [id number]

## 2018-04-19 NOTE — PROGRESS NOTES
[facility]   [address] [facility], [facility] [address], [facility]  Phone: [phone]   [phone]       Nephrology Progress Note  Trudy Haywood     1935     [id number]  Date of Admission : 4/16/2018 04/19/18    CC: Follow up for JEROD        Assessment and Plan   JEROD on CKD   - 2/2 Acute MI, depressed LV function    - Renal US : showed Left Kidney stone,Non Obstructing. Shows possible liver met ?   - Cr worse due to profound hypotension overnight  ? From higher dose Cardizem   - would avoid Cardiac cath today due to overnight events  - stopped Lasix. She has few rales on Left base       CKD Stage III :  - baseline Cr 0.9-1 mg/dl lately   - Cr has steadily increased from 0.6 to 0.9 over the last few years and may be related to the time when she was on Jardiance      NSTEMI :  - Echo shows EF 35-45%, mod LVH, Severe Hypokinesis of apex and moderate Hypokinesis of anteroseptal wall  - cath plans per Dr Maxine Watson      Acute Systolic CHF: 2/2 MI   - hold diuretics  - hold ACE/ARB  - coreg dosing per cards : has very low diastolic BP      Chronic Pontine Infarcts      ? Brain Mets on MRI and Liver met    hx of colon Ca      Interval History:  Seen and examined  Profound Hypotension overnight   Systolic BP better after IV albumin but diastolic still in 49Q   Denies any CP /N/V.sob +    Review of Systems: Pertinent items are noted in HPI.     Current Medications:   Current Facility-Administered Medications   Medication Dose Route Frequency    0.9% sodium chloride infusion  75 mL/hr IntraVENous CONTINUOUS    albumin human 25% (BUMINATE) solution 25 g  25 g IntraVENous ONCE    carvedilol (COREG) tablet 3.125 mg  3.125 mg Oral BID    cefTRIAXone (ROCEPHIN) 1 g in 0.9% sodium chloride (MBP/ADV) 50 mL  1 g IntraVENous Q24H    glucose chewable tablet 16 g  4 Tab Oral PRN    dextrose (D50W) injection syrg 12.5-25 g  12.5-25 g IntraVENous PRN    glucagon (GLUCAGEN) injection 1 mg  1 mg IntraMUSCular PRN  insulin lispro (HUMALOG) injection   SubCUTAneous AC&HS    prochlorperazine (COMPAZINE) with saline injection 5 mg  5 mg IntraVENous Q8H PRN    ascorbic acid (vitamin C) (VITAMIN C) tablet 500 mg  500 mg Oral DAILY    aspirin delayed-release tablet 81 mg  81 mg Oral DAILY    cholecalciferol (VITAMIN D3) tablet 1,000 Units  1,000 Units Oral DAILY    clopidogrel (PLAVIX) tablet 75 mg  75 mg Oral DAILY    folic acid (FOLVITE) tablet 1 mg  1 mg Oral DAILY    glimepiride (AMARYL) tablet 1 mg  1 mg Oral 7am    magnesium oxide (MAG-OX) tablet 400 mg  400 mg Oral DAILY    therapeutic multivitamin (THERAGRAN) tablet 1 Tab  1 Tab Oral DAILY    fish oil-omega-3 fatty acids 340-1,000 mg capsule 1 Cap  1 Cap Oral DAILY    SITagliptin (JANUVIA) tablet tab 50 mg  50 mg Oral DAILY    sodium chloride (NS) flush 5-10 mL  5-10 mL IntraVENous Q8H    sodium chloride (NS) flush 5-10 mL  5-10 mL IntraVENous PRN    acetaminophen (TYLENOL) tablet 650 mg  650 mg Oral Q4H PRN    ondansetron (ZOFRAN) injection 4 mg  4 mg IntraVENous Q4H PRN    enoxaparin (LOVENOX) injection 30 mg  30 mg SubCUTAneous Q24H    levothyroxine (SYNTHROID) tablet 88 mcg  88 mcg Oral ACB      Allergies   Allergen Reactions    Statins-Hmg-Coa Reductase Inhibitors Other (comments)     Intolerant to statins     Sulfa (Sulfonamide Antibiotics) Other (comments)     syncope       Objective:  Vitals:    Vitals:    04/19/18 0600 04/19/18 0615 04/19/18 0630 04/19/18 0645   BP: 97/44 122/41 (!) 119/33 (!) 119/37   Pulse: 90 91 90 87   Resp: 18 21 22 21   Temp:       SpO2: 100% 99% 98% 97%   Weight:       Height:         Intake and Output:  04/19 0701 - 04/19 1900  In: -   Out: 300 [Urine:300]  04/17 1901 - 04/19 0700  In: -   Out: 900 [Urine:900]    Physical Examination:    General: No distress  Neck:  Supple, no mass  Resp:  Few rales left base  CV:  RRR, no murmur  GI:  Soft, NT, + BS  Neurologic:  Non focal  :  No castro     []    High complexity decision making was performed  []    Patient is at high-risk of decompensation with multiple organ involvement    Lab Data Personally Reviewed: I have reviewed all the pertinent labs, microbiology data and radiology studies during assessment. Recent Labs      04/19/18 0328 04/18/18 0814 04/18/18 0449  04/17/18   0240   NA  134*  133*  132*  130*   K  3.4*  4.0  4.0  4.9   CL  100  95*  95*  97   CO2  27  27  27  23   GLU  172*  268*  279*  192*   BUN  35*  30*  31*  31*   CREA  1.78*  1.63*  1.55*  1.61*   CA  8.6  9.3  9.7  9.3   PHOS   --   3.5   --    --    ALB   --   2.6*  2.7*  2.5*   SGOT   --    --   181*  102*   ALT   --    --   63  83*     Recent Labs      04/19/18 0328 04/18/18 0814  04/17/18   0240   WBC  12.3*  10.4  9.5   HGB  8.9*  9.6*  9.7*   HCT  27.8*  28.8*  30.7*   PLT  289  392  478*     No results found for: SDES  Lab Results   Component Value Date/Time    Culture result: NO GROWTH AFTER 19 HOURS 04/18/2018 08:15 AM    Culture result: NO GROWTH 2 DAYS 02/09/2018 08:18 PM     Recent Results (from the past 24 hour(s))   CBC WITH AUTOMATED DIFF    Collection Time: 04/18/18  8:14 AM   Result Value Ref Range    WBC 10.4 3.6 - 11.0 K/uL    RBC 3.30 (L) 3.80 - 5.20 M/uL    HGB 9.6 (L) 11.5 - 16.0 g/dL    HCT 28.8 (L) 35.0 - 47.0 %    MCV 87.3 80.0 - 99.0 FL    MCH 29.1 26.0 - 34.0 PG    MCHC 33.3 30.0 - 36.5 g/dL    RDW 22.8 (H) 11.5 - 14.5 %    PLATELET 237 171 - 227 K/uL    MPV 9.7 8.9 - 12.9 FL    NRBC 0.0 0  WBC    ABSOLUTE NRBC 0.00 0.00 - 0.01 K/uL    NEUTROPHILS 67 32 - 75 %    LYMPHOCYTES 12 12 - 49 %    MONOCYTES 19 (H) 5 - 13 %    EOSINOPHILS 1 0 - 7 %    BASOPHILS 1 0 - 1 %    IMMATURE GRANULOCYTES 0 %    ABS. NEUTROPHILS 7.0 1.8 - 8.0 K/UL    ABS. LYMPHOCYTES 1.2 0.8 - 3.5 K/UL    ABS. MONOCYTES 2.0 (H) 0.0 - 1.0 K/UL    ABS. EOSINOPHILS 0.1 0.0 - 0.4 K/UL    ABS. BASOPHILS 0.1 0.0 - 0.1 K/UL    ABS. IMM.  GRANS. 0.0 K/UL    DF MANUAL      PLATELET COMMENTS CLUMPED PLATELETS      RBC COMMENTS ANISOCYTOSIS  2+        RBC COMMENTS OVALOCYTES  PRESENT       RENAL FUNCTION PANEL    Collection Time: 04/18/18  8:14 AM   Result Value Ref Range    Sodium 133 (L) 136 - 145 mmol/L    Potassium 4.0 3.5 - 5.1 mmol/L    Chloride 95 (L) 97 - 108 mmol/L    CO2 27 21 - 32 mmol/L    Anion gap 11 5 - 15 mmol/L    Glucose 268 (H) 65 - 100 mg/dL    BUN 30 (H) 6 - 20 MG/DL    Creatinine 1.63 (H) 0.55 - 1.02 MG/DL    BUN/Creatinine ratio 18 12 - 20      GFR est AA 37 (L) >60 ml/min/1.73m2    GFR est non-AA 30 (L) >60 ml/min/1.73m2    Calcium 9.3 8.5 - 10.1 MG/DL    Phosphorus 3.5 2.6 - 4.7 MG/DL    Albumin 2.6 (L) 3.5 - 5.0 g/dL   HEMOGLOBIN A1C WITH EAG    Collection Time: 04/18/18  8:14 AM   Result Value Ref Range    Hemoglobin A1c 7.0 (H) 4.2 - 6.3 %    Est. average glucose 154 mg/dL   CULTURE, BLOOD, PAIRED    Collection Time: 04/18/18  8:15 AM   Result Value Ref Range    Special Requests: NO SPECIAL REQUESTS      Culture result: NO GROWTH AFTER 19 HOURS     GLUCOSE, POC    Collection Time: 04/18/18 12:13 PM   Result Value Ref Range    Glucose (POC) 328 (H) 65 - 100 mg/dL    Performed by Gay Ponce    GLUCOSE, POC    Collection Time: 04/18/18  1:30 PM   Result Value Ref Range    Glucose (POC) 313 (H) 65 - 100 mg/dL    Performed by Dario Conde    GLUCOSE, POC    Collection Time: 04/18/18  4:47 PM   Result Value Ref Range    Glucose (POC) 221 (H) 65 - 100 mg/dL    Performed by Ashleigh Hayden W/MICROSCOPIC    Collection Time: 04/18/18  7:00 PM   Result Value Ref Range    Color YELLOW/STRAW      Appearance CLOUDY (A) CLEAR      Specific gravity 1.016 1.003 - 1.030      pH (UA) 6.5 5.0 - 8.0      Protein NEGATIVE  NEG mg/dL    Glucose NEGATIVE  NEG mg/dL    Ketone NEGATIVE  NEG mg/dL    Bilirubin NEGATIVE  NEG      Blood SMALL (A) NEG      Urobilinogen 0.2 0.2 - 1.0 EU/dL    Nitrites POSITIVE (A) NEG      Leukocyte Esterase MODERATE (A) NEG      WBC 10-20 0 - 4 /hpf    RBC 0-5 0 - 5 /hpf    Epithelial cells FEW FEW /lpf    Bacteria 1+ (A) NEG /hpf    Hyaline cast 0-2 0 - 5 /lpf   GLUCOSE, POC    Collection Time: 04/18/18  9:17 PM   Result Value Ref Range    Glucose (POC) 215 (H) 65 - 100 mg/dL    Performed by Nba Avila   PCT    GLUCOSE, POC    Collection Time: 04/18/18 11:55 PM   Result Value Ref Range    Glucose (POC) 234 (H) 65 - 100 mg/dL    Performed by Vipul German    METABOLIC PANEL, BASIC    Collection Time: 04/19/18  3:28 AM   Result Value Ref Range    Sodium 134 (L) 136 - 145 mmol/L    Potassium 3.4 (L) 3.5 - 5.1 mmol/L    Chloride 100 97 - 108 mmol/L    CO2 27 21 - 32 mmol/L    Anion gap 7 5 - 15 mmol/L    Glucose 172 (H) 65 - 100 mg/dL    BUN 35 (H) 6 - 20 MG/DL    Creatinine 1.78 (H) 0.55 - 1.02 MG/DL    BUN/Creatinine ratio 20 12 - 20      GFR est AA 33 (L) >60 ml/min/1.73m2    GFR est non-AA 27 (L) >60 ml/min/1.73m2    Calcium 8.6 8.5 - 10.1 MG/DL   CBC WITH AUTOMATED DIFF    Collection Time: 04/19/18  3:28 AM   Result Value Ref Range    WBC 12.3 (H) 3.6 - 11.0 K/uL    RBC 3.08 (L) 3.80 - 5.20 M/uL    HGB 8.9 (L) 11.5 - 16.0 g/dL    HCT 27.8 (L) 35.0 - 47.0 %    MCV 90.3 80.0 - 99.0 FL    MCH 28.9 26.0 - 34.0 PG    MCHC 32.0 30.0 - 36.5 g/dL    RDW 23.0 (H) 11.5 - 14.5 %    PLATELET 310 239 - 360 K/uL    MPV 9.3 8.9 - 12.9 FL    NRBC 0.2 (H) 0  WBC    ABSOLUTE NRBC 0.03 (H) 0.00 - 0.01 K/uL    NEUTROPHILS 61 32 - 75 %    LYMPHOCYTES 14 12 - 49 %    MONOCYTES 24 (H) 5 - 13 %    EOSINOPHILS 0 0 - 7 %    BASOPHILS 0 0 - 1 %    IMMATURE GRANULOCYTES 1 (H) 0.0 - 0.5 %    ABS. NEUTROPHILS 7.5 1.8 - 8.0 K/UL    ABS. LYMPHOCYTES 1.7 0.8 - 3.5 K/UL    ABS. MONOCYTES 3.0 (H) 0.0 - 1.0 K/UL    ABS. EOSINOPHILS 0.0 0.0 - 0.4 K/UL    ABS. BASOPHILS 0.0 0.0 - 0.1 K/UL    ABS. IMM.  GRANS. 0.1 (H) 0.00 - 0.04 K/UL    DF SMEAR SCANNED      PLATELET COMMENTS CLUMPED PLATELETS      RBC COMMENTS ANISOCYTOSIS  2+        RBC COMMENTS POLYCHROMASIA  1+       LACTIC ACID    Collection Time: 04/19/18  3:28 AM   Result Value Ref Range    Lactic acid 1.2 0.4 - 2.0 MMOL/L   GLUCOSE, POC    Collection Time: 04/19/18  6:56 AM   Result Value Ref Range    Glucose (POC) 156 (H) 65 - 100 mg/dL    Performed by Mariah Tee        I have reviewed the flowsheets. Chart and Pertinent Notes have been reviewed. No change in PMH ,family and social history from Consult note.       Darwin Flores MD

## 2018-04-19 NOTE — PROGRESS NOTES
Cardiology Progress Note            Admit Date: 4/16/2018  Admit Diagnosis: SOB (shortness of breath)  Date: 4/19/2018     Time: 2:01 PM    HPI: 82 y.o. Female with new diagnosis of CHF. Presented with chief c/o of SOB, found to have pulmonary edema and EF 40% on TTE. Troponin elevated and trending up, EKG with poor R wave progression and now inverted t waves   In anterior leads. Pt clinical status also complicated by elevated creatinine requiring that LHC be postponed. Assessment and Plan     1. NSTEMI:  Troponin trending down from 18.9    -no chest pain  -4/18 12 lead EKG with t wave inversions anterior leads, now more prominent in V5  -TTE:  EF 40%, severe hypokinesis of apical wals, mod hypk of basal mid anteroseptal wall. Mod LVH, Mild-mod MR, mild TR  -Continue ASA, coreg, plavix  -Statin intolerant additionally LFTs elevated  -Plan for LHC on hold with probable metastatic dz    2. Cardiomyopathy/Acute HFrEF, new:  EF 40% NYHA IV on admission. Suspect NYHA III today   -improved clinically but still appears to be in failure  -Continue Coreg  -No Ace-I due to elevated creatinine  -Lasix 40 mg IV BID    3. JEROD on CKD:   creatinine 1.63-1.55 on today's labs from 1.61.   -Baseline creat 0.9-1 mg/dl  -Renal US with nonobstructing L kidney stone. And a hepatic lesion  -Nephrology following    4. Anemia: hgb 9.7 4/18  -management per renal/primary team    5. Hx of essential HTN; bp currently controlled  -Continue coreg    6. Fever:  Tmax 101.1 overnight, now normalized  -CBC ordered  -Monitor/management per primary team.    7. Elevated LFTs:  Trending up today.  -No statin   -Management/workup per primary team.    8. Hx of Colon cancer: Had findings on MRI concerning for mets. -Outpt bone scan was planned per chart. - reordered today     Reordered bone scan today. Asked Oncology again to see this patient.   D/w son again, hold off on cath if patient with metastatic dz. Await Oncology recs. Cardiology Attending:Patient seen and examined. I agree with NP assessment and plans. Discussed at length with son. Yusra Fisher MD 4/20/2018 10:48 AM       Subjective:   Retia Alphonso denies chest pain, abdominal pain, nausea. Objective:      Physical Exam:                Visit Vitals    /71    Pulse 90    Temp 98.1 °F (36.7 °C)    Resp 20    Ht 5' 1\" (1.549 m)    Wt 127 lb 10.3 oz (57.9 kg)    SpO2 100%    BMI 24.12 kg/m2          General Appearance:   Sickly looking, elderly alert female in no acute distress. Ears/Nose/Mouth/Throat:    Hearing grossly normal.         Neck:  Supple. Chest:    Lungs with bibasilar crackles. Off O2   Cardiovascular:    Regular rate and rhythm, S1, S2 normal, no murmur. Abdomen:    Soft, non-tender, bowel sounds are active. Extremities:  No edema bilaterally. Skin:  Warm and dry.      Telemetry: NSR          Data Review:    Labs:    Recent Results (from the past 24 hour(s))   GLUCOSE, POC    Collection Time: 04/18/18 12:13 PM   Result Value Ref Range    Glucose (POC) 328 (H) 65 - 100 mg/dL    Performed by Hema 72, POC    Collection Time: 04/18/18  1:30 PM   Result Value Ref Range    Glucose (POC) 313 (H) 65 - 100 mg/dL    Performed by Solomon Conde    GLUCOSE, POC    Collection Time: 04/18/18  4:47 PM   Result Value Ref Range    Glucose (POC) 221 (H) 65 - 100 mg/dL    Performed by Reji Arita W/MICROSCOPIC    Collection Time: 04/18/18  7:00 PM   Result Value Ref Range    Color YELLOW/STRAW      Appearance CLOUDY (A) CLEAR      Specific gravity 1.016 1.003 - 1.030      pH (UA) 6.5 5.0 - 8.0      Protein NEGATIVE  NEG mg/dL    Glucose NEGATIVE  NEG mg/dL    Ketone NEGATIVE  NEG mg/dL    Bilirubin NEGATIVE  NEG      Blood SMALL (A) NEG      Urobilinogen 0.2 0.2 - 1.0 EU/dL    Nitrites POSITIVE (A) NEG      Leukocyte Esterase MODERATE (A) NEG      WBC 10-20 0 - 4 /hpf    RBC 0-5 0 - 5 /hpf    Epithelial cells FEW FEW /lpf    Bacteria 1+ (A) NEG /hpf    Hyaline cast 0-2 0 - 5 /lpf   GLUCOSE, POC    Collection Time: 04/18/18  9:17 PM   Result Value Ref Range    Glucose (POC) 215 (H) 65 - 100 mg/dL    Performed by Albert Haas   PCT    GLUCOSE, POC    Collection Time: 04/18/18 11:55 PM   Result Value Ref Range    Glucose (POC) 234 (H) 65 - 100 mg/dL    Performed by [facility]    METABOLIC PANEL, BASIC    Collection Time: 04/19/18  3:28 AM   Result Value Ref Range    Sodium 134 (L) 136 - 145 mmol/L    Potassium 3.4 (L) 3.5 - 5.1 mmol/L    Chloride 100 97 - 108 mmol/L    CO2 27 21 - 32 mmol/L    Anion gap 7 5 - 15 mmol/L    Glucose 172 (H) 65 - 100 mg/dL    BUN 35 (H) 6 - 20 MG/DL    Creatinine 1.78 (H) 0.55 - 1.02 MG/DL    BUN/Creatinine ratio 20 12 - 20      GFR est AA 33 (L) >60 ml/min/1.73m2    GFR est non-AA 27 (L) >60 ml/min/1.73m2    Calcium 8.6 8.5 - 10.1 MG/DL   CBC WITH AUTOMATED DIFF    Collection Time: 04/19/18  3:28 AM   Result Value Ref Range    WBC 12.3 (H) 3.6 - 11.0 K/uL    RBC 3.08 (L) 3.80 - 5.20 M/uL    HGB 8.9 (L) 11.5 - 16.0 g/dL    HCT 27.8 (L) 35.0 - 47.0 %    MCV 90.3 80.0 - 99.0 FL    MCH 28.9 26.0 - 34.0 PG    MCHC 32.0 30.0 - 36.5 g/dL    RDW 23.0 (H) 11.5 - 14.5 %    PLATELET 595 065 - 448 K/uL    MPV 9.3 8.9 - 12.9 FL    NRBC 0.2 (H) 0  WBC    ABSOLUTE NRBC 0.03 (H) 0.00 - 0.01 K/uL    NEUTROPHILS 61 32 - 75 %    LYMPHOCYTES 14 12 - 49 %    MONOCYTES 24 (H) 5 - 13 %    EOSINOPHILS 0 0 - 7 %    BASOPHILS 0 0 - 1 %    IMMATURE GRANULOCYTES 1 (H) 0.0 - 0.5 %    ABS. NEUTROPHILS 7.5 1.8 - 8.0 K/UL    ABS. LYMPHOCYTES 1.7 0.8 - 3.5 K/UL    ABS. MONOCYTES 3.0 (H) 0.0 - 1.0 K/UL    ABS. EOSINOPHILS 0.0 0.0 - 0.4 K/UL    ABS. BASOPHILS 0.0 0.0 - 0.1 K/UL    ABS. IMM.  GRANS. 0.1 (H) 0.00 - 0.04 K/UL    DF SMEAR SCANNED      PLATELET COMMENTS CLUMPED PLATELETS      RBC COMMENTS ANISOCYTOSIS  2+        RBC COMMENTS POLYCHROMASIA  1+ LACTIC ACID    Collection Time: 04/19/18  3:28 AM   Result Value Ref Range    Lactic acid 1.2 0.4 - 2.0 MMOL/L   GLUCOSE, POC    Collection Time: 04/19/18  6:56 AM   Result Value Ref Range    Glucose (POC) 156 (H) 65 - 100 mg/dL    Performed by Bear Thomas           Radiology:        Current Facility-Administered Medications   Medication Dose Route Frequency    0.9% sodium chloride infusion  75 mL/hr IntraVENous CONTINUOUS    albumin human 25% (BUMINATE) solution 25 g  25 g IntraVENous ONCE    carvedilol (COREG) tablet 3.125 mg  3.125 mg Oral BID    cefTRIAXone (ROCEPHIN) 1 g in 0.9% sodium chloride (MBP/ADV) 50 mL  1 g IntraVENous Q24H    glucose chewable tablet 16 g  4 Tab Oral PRN    dextrose (D50W) injection syrg 12.5-25 g  12.5-25 g IntraVENous PRN    glucagon (GLUCAGEN) injection 1 mg  1 mg IntraMUSCular PRN    insulin lispro (HUMALOG) injection   SubCUTAneous AC&HS    prochlorperazine (COMPAZINE) with saline injection 5 mg  5 mg IntraVENous Q8H PRN    ascorbic acid (vitamin C) (VITAMIN C) tablet 500 mg  500 mg Oral DAILY    aspirin delayed-release tablet 81 mg  81 mg Oral DAILY    cholecalciferol (VITAMIN D3) tablet 1,000 Units  1,000 Units Oral DAILY    clopidogrel (PLAVIX) tablet 75 mg  75 mg Oral DAILY    folic acid (FOLVITE) tablet 1 mg  1 mg Oral DAILY    glimepiride (AMARYL) tablet 1 mg  1 mg Oral 7am    magnesium oxide (MAG-OX) tablet 400 mg  400 mg Oral DAILY    therapeutic multivitamin (THERAGRAN) tablet 1 Tab  1 Tab Oral DAILY    fish oil-omega-3 fatty acids 340-1,000 mg capsule 1 Cap  1 Cap Oral DAILY    SITagliptin (JANUVIA) tablet tab 50 mg  50 mg Oral DAILY    sodium chloride (NS) flush 5-10 mL  5-10 mL IntraVENous Q8H    sodium chloride (NS) flush 5-10 mL  5-10 mL IntraVENous PRN    acetaminophen (TYLENOL) tablet 650 mg  650 mg Oral Q4H PRN    ondansetron (ZOFRAN) injection 4 mg  4 mg IntraVENous Q4H PRN    enoxaparin (LOVENOX) injection 30 mg  30 mg SubCUTAneous Q24H    levothyroxine (SYNTHROID) tablet 88 mcg  88 mcg Oral ACB          Negar Arteaga PA-C     [facility] [address] [place] [address] Drive, [address] [place] [address],[address]   Radha Montana   [phone]

## 2018-04-20 ENCOUNTER — APPOINTMENT (OUTPATIENT)
Dept: GENERAL RADIOLOGY | Age: 83
DRG: 853 | End: 2018-04-20
Attending: FAMILY MEDICINE
Payer: MEDICARE

## 2018-04-20 ENCOUNTER — APPOINTMENT (OUTPATIENT)
Dept: NUCLEAR MEDICINE | Age: 83
DRG: 853 | End: 2018-04-20
Attending: PHYSICIAN ASSISTANT
Payer: MEDICARE

## 2018-04-20 ENCOUNTER — APPOINTMENT (OUTPATIENT)
Dept: MRI IMAGING | Age: 83
DRG: 853 | End: 2018-04-20
Attending: INTERNAL MEDICINE
Payer: MEDICARE

## 2018-04-20 LAB
ANION GAP SERPL CALC-SCNC: 10 MMOL/L (ref 5–15)
APPEARANCE UR: ABNORMAL
BACTERIA URNS QL MICRO: ABNORMAL /HPF
BILIRUB UR QL: NEGATIVE
BUN SERPL-MCNC: 36 MG/DL (ref 6–20)
BUN/CREAT SERPL: 20 (ref 12–20)
CALCIUM SERPL-MCNC: 8.7 MG/DL (ref 8.5–10.1)
CEA SERPL-MCNC: 11.5 NG/ML
CHLORIDE SERPL-SCNC: 99 MMOL/L (ref 97–108)
CO2 SERPL-SCNC: 24 MMOL/L (ref 21–32)
COLOR UR: ABNORMAL
CORTIS SERPL-MCNC: 31.1 UG/DL
CREAT SERPL-MCNC: 1.78 MG/DL (ref 0.55–1.02)
EPITH CASTS URNS QL MICRO: ABNORMAL /LPF
ERYTHROCYTE [DISTWIDTH] IN BLOOD BY AUTOMATED COUNT: 22.8 % (ref 11.5–14.5)
GLUCOSE BLD STRIP.AUTO-MCNC: 125 MG/DL (ref 65–100)
GLUCOSE BLD STRIP.AUTO-MCNC: 169 MG/DL (ref 65–100)
GLUCOSE BLD STRIP.AUTO-MCNC: 280 MG/DL (ref 65–100)
GLUCOSE BLD STRIP.AUTO-MCNC: 307 MG/DL (ref 65–100)
GLUCOSE SERPL-MCNC: 110 MG/DL (ref 65–100)
GLUCOSE UR STRIP.AUTO-MCNC: NEGATIVE MG/DL
HCT VFR BLD AUTO: 23.6 % (ref 35–47)
HGB BLD-MCNC: 7.5 G/DL (ref 11.5–16)
HGB UR QL STRIP: ABNORMAL
KETONES UR QL STRIP.AUTO: NEGATIVE MG/DL
LEUKOCYTE ESTERASE UR QL STRIP.AUTO: ABNORMAL
MCH RBC QN AUTO: 28.6 PG (ref 26–34)
MCHC RBC AUTO-ENTMCNC: 31.8 G/DL (ref 30–36.5)
MCV RBC AUTO: 90.1 FL (ref 80–99)
NITRITE UR QL STRIP.AUTO: NEGATIVE
NRBC # BLD: 0 K/UL (ref 0–0.01)
NRBC BLD-RTO: 0 PER 100 WBC
PH UR STRIP: 5 [PH] (ref 5–8)
PLATELET # BLD AUTO: 233 K/UL (ref 150–400)
PMV BLD AUTO: 10.1 FL (ref 8.9–12.9)
POTASSIUM SERPL-SCNC: 3.5 MMOL/L (ref 3.5–5.1)
PROT UR STRIP-MCNC: 100 MG/DL
RBC # BLD AUTO: 2.62 M/UL (ref 3.8–5.2)
RBC #/AREA URNS HPF: ABNORMAL /HPF (ref 0–5)
SERVICE CMNT-IMP: ABNORMAL
SODIUM SERPL-SCNC: 133 MMOL/L (ref 136–145)
SP GR UR REFRACTOMETRY: 1.03 (ref 1–1.03)
UA: UC IF INDICATED,UAUC: ABNORMAL
UROBILINOGEN UR QL STRIP.AUTO: 0.2 EU/DL (ref 0.2–1)
WBC # BLD AUTO: 13.7 K/UL (ref 3.6–11)
WBC URNS QL MICRO: >100 /HPF (ref 0–4)

## 2018-04-20 PROCEDURE — 74011000250 HC RX REV CODE- 250: Performed by: INTERNAL MEDICINE

## 2018-04-20 PROCEDURE — 74011000258 HC RX REV CODE- 258: Performed by: HOSPITALIST

## 2018-04-20 PROCEDURE — 80048 BASIC METABOLIC PNL TOTAL CA: CPT | Performed by: HOSPITALIST

## 2018-04-20 PROCEDURE — 74011250637 HC RX REV CODE- 250/637: Performed by: INTERNAL MEDICINE

## 2018-04-20 PROCEDURE — 74011250636 HC RX REV CODE- 250/636: Performed by: INTERNAL MEDICINE

## 2018-04-20 PROCEDURE — G8987 SELF CARE CURRENT STATUS: HCPCS

## 2018-04-20 PROCEDURE — 74011000250 HC RX REV CODE- 250: Performed by: FAMILY MEDICINE

## 2018-04-20 PROCEDURE — 82378 CARCINOEMBRYONIC ANTIGEN: CPT | Performed by: INTERNAL MEDICINE

## 2018-04-20 PROCEDURE — 02HV33Z INSERTION OF INFUSION DEVICE INTO SUPERIOR VENA CAVA, PERCUTANEOUS APPROACH: ICD-10-PCS | Performed by: ANESTHESIOLOGY

## 2018-04-20 PROCEDURE — 74183 MRI ABD W/O CNTR FLWD CNTR: CPT

## 2018-04-20 PROCEDURE — 81001 URINALYSIS AUTO W/SCOPE: CPT | Performed by: SPECIALIST

## 2018-04-20 PROCEDURE — A9577 INJ MULTIHANCE: HCPCS | Performed by: HOSPITALIST

## 2018-04-20 PROCEDURE — 65610000006 HC RM INTENSIVE CARE

## 2018-04-20 PROCEDURE — P9047 ALBUMIN (HUMAN), 25%, 50ML: HCPCS | Performed by: INTERNAL MEDICINE

## 2018-04-20 PROCEDURE — 82533 TOTAL CORTISOL: CPT | Performed by: INTERNAL MEDICINE

## 2018-04-20 PROCEDURE — 85027 COMPLETE CBC AUTOMATED: CPT | Performed by: HOSPITALIST

## 2018-04-20 PROCEDURE — 93308 TTE F-UP OR LMTD: CPT

## 2018-04-20 PROCEDURE — 97535 SELF CARE MNGMENT TRAINING: CPT

## 2018-04-20 PROCEDURE — A9503 TC99M MEDRONATE: HCPCS

## 2018-04-20 PROCEDURE — 36415 COLL VENOUS BLD VENIPUNCTURE: CPT | Performed by: HOSPITALIST

## 2018-04-20 PROCEDURE — 93005 ELECTROCARDIOGRAM TRACING: CPT

## 2018-04-20 PROCEDURE — 74011000250 HC RX REV CODE- 250: Performed by: ANESTHESIOLOGY

## 2018-04-20 PROCEDURE — 74011250636 HC RX REV CODE- 250/636: Performed by: FAMILY MEDICINE

## 2018-04-20 PROCEDURE — 97165 OT EVAL LOW COMPLEX 30 MIN: CPT

## 2018-04-20 PROCEDURE — 71045 X-RAY EXAM CHEST 1 VIEW: CPT

## 2018-04-20 PROCEDURE — 74011000258 HC RX REV CODE- 258: Performed by: INTERNAL MEDICINE

## 2018-04-20 PROCEDURE — 77010033678 HC OXYGEN DAILY

## 2018-04-20 PROCEDURE — 36591 DRAW BLOOD OFF VENOUS DEVICE: CPT

## 2018-04-20 PROCEDURE — 74011250637 HC RX REV CODE- 250/637: Performed by: HOSPITALIST

## 2018-04-20 PROCEDURE — 97161 PT EVAL LOW COMPLEX 20 MIN: CPT

## 2018-04-20 PROCEDURE — 97530 THERAPEUTIC ACTIVITIES: CPT

## 2018-04-20 PROCEDURE — 82962 GLUCOSE BLOOD TEST: CPT

## 2018-04-20 PROCEDURE — 87086 URINE CULTURE/COLONY COUNT: CPT | Performed by: SPECIALIST

## 2018-04-20 PROCEDURE — 77030021566 MRI ABD W WO CONT

## 2018-04-20 PROCEDURE — 74011250636 HC RX REV CODE- 250/636: Performed by: HOSPITALIST

## 2018-04-20 PROCEDURE — G8988 SELF CARE GOAL STATUS: HCPCS

## 2018-04-20 PROCEDURE — 74011636637 HC RX REV CODE- 636/637: Performed by: HOSPITALIST

## 2018-04-20 RX ORDER — HYDROCORTISONE SODIUM SUCCINATE 100 MG/2ML
100 INJECTION, POWDER, FOR SOLUTION INTRAMUSCULAR; INTRAVENOUS EVERY 8 HOURS
Status: DISCONTINUED | OUTPATIENT
Start: 2018-04-20 | End: 2018-04-22

## 2018-04-20 RX ORDER — HYDROCORTISONE SODIUM SUCCINATE 100 MG/2ML
100 INJECTION, POWDER, FOR SOLUTION INTRAMUSCULAR; INTRAVENOUS ONCE
Status: COMPLETED | OUTPATIENT
Start: 2018-04-20 | End: 2018-04-20

## 2018-04-20 RX ORDER — ALBUMIN HUMAN 250 G/1000ML
12.5 SOLUTION INTRAVENOUS EVERY 6 HOURS
Status: DISCONTINUED | OUTPATIENT
Start: 2018-04-20 | End: 2018-04-20

## 2018-04-20 RX ORDER — SODIUM CHLORIDE 0.9 % (FLUSH) 0.9 %
10 SYRINGE (ML) INJECTION
Status: COMPLETED | OUTPATIENT
Start: 2018-04-20 | End: 2018-04-20

## 2018-04-20 RX ORDER — HYDROCORTISONE SODIUM SUCCINATE 100 MG/2ML
50 INJECTION, POWDER, FOR SOLUTION INTRAMUSCULAR; INTRAVENOUS EVERY 8 HOURS
Status: DISCONTINUED | OUTPATIENT
Start: 2018-04-20 | End: 2018-04-20

## 2018-04-20 RX ORDER — ALBUMIN HUMAN 250 G/1000ML
25 SOLUTION INTRAVENOUS EVERY 6 HOURS
Status: COMPLETED | OUTPATIENT
Start: 2018-04-20 | End: 2018-04-20

## 2018-04-20 RX ADMIN — INSULIN LISPRO 2 UNITS: 100 INJECTION, SOLUTION INTRAVENOUS; SUBCUTANEOUS at 11:56

## 2018-04-20 RX ADMIN — Medication 3 MCG/MIN: at 23:47

## 2018-04-20 RX ADMIN — Medication 1 CAPSULE: at 09:03

## 2018-04-20 RX ADMIN — ACETAMINOPHEN 650 MG: 325 TABLET, FILM COATED ORAL at 01:41

## 2018-04-20 RX ADMIN — VITAMIN D, TAB 1000IU (100/BT) 1000 UNITS: 25 TAB at 09:02

## 2018-04-20 RX ADMIN — Medication 10 ML: at 13:27

## 2018-04-20 RX ADMIN — SODIUM CHLORIDE 20 MG: 9 INJECTION INTRAMUSCULAR; INTRAVENOUS; SUBCUTANEOUS at 11:51

## 2018-04-20 RX ADMIN — SITAGLIPTIN 50 MG: 25 TABLET, FILM COATED ORAL at 09:03

## 2018-04-20 RX ADMIN — CLOPIDOGREL BISULFATE 75 MG: 75 TABLET ORAL at 09:02

## 2018-04-20 RX ADMIN — GLIMEPIRIDE 1 MG: 1 TABLET ORAL at 06:22

## 2018-04-20 RX ADMIN — Medication 10 ML: at 15:12

## 2018-04-20 RX ADMIN — PIPERACILLIN SODIUM,TAZOBACTAM SODIUM 3.38 G: 3; .375 INJECTION, POWDER, FOR SOLUTION INTRAVENOUS at 17:32

## 2018-04-20 RX ADMIN — Medication 10 ML: at 06:16

## 2018-04-20 RX ADMIN — PIPERACILLIN SODIUM,TAZOBACTAM SODIUM 3.38 G: 3; .375 INJECTION, POWDER, FOR SOLUTION INTRAVENOUS at 09:04

## 2018-04-20 RX ADMIN — ALBUMIN (HUMAN) 25 G: 0.25 INJECTION, SOLUTION INTRAVENOUS at 21:36

## 2018-04-20 RX ADMIN — SODIUM CHLORIDE 100 ML: 900 INJECTION, SOLUTION INTRAVENOUS at 13:26

## 2018-04-20 RX ADMIN — ALBUMIN (HUMAN) 25 G: 0.25 INJECTION, SOLUTION INTRAVENOUS at 15:15

## 2018-04-20 RX ADMIN — ASPIRIN 81 MG: 81 TABLET, COATED ORAL at 09:03

## 2018-04-20 RX ADMIN — LEVOTHYROXINE SODIUM 88 MCG: 88 TABLET ORAL at 06:31

## 2018-04-20 RX ADMIN — LIDOCAINE HYDROCHLORIDE 1 ML: 10 INJECTION, SOLUTION INFILTRATION; PERINEURAL at 00:29

## 2018-04-20 RX ADMIN — ALBUMIN (HUMAN) 25 G: 0.25 INJECTION, SOLUTION INTRAVENOUS at 11:47

## 2018-04-20 RX ADMIN — Medication 10 ML: at 21:36

## 2018-04-20 RX ADMIN — INSULIN LISPRO 7 UNITS: 100 INJECTION, SOLUTION INTRAVENOUS; SUBCUTANEOUS at 21:36

## 2018-04-20 RX ADMIN — HYDROCORTISONE SODIUM SUCCINATE 100 MG: 100 INJECTION, POWDER, FOR SOLUTION INTRAMUSCULAR; INTRAVENOUS at 09:09

## 2018-04-20 RX ADMIN — GADOBENATE DIMEGLUMINE 10 ML: 529 INJECTION, SOLUTION INTRAVENOUS at 13:26

## 2018-04-20 RX ADMIN — OXYCODONE HYDROCHLORIDE AND ACETAMINOPHEN 500 MG: 500 TABLET ORAL at 09:02

## 2018-04-20 RX ADMIN — PIPERACILLIN SODIUM,TAZOBACTAM SODIUM 3.38 G: 3; .375 INJECTION, POWDER, FOR SOLUTION INTRAVENOUS at 01:25

## 2018-04-20 RX ADMIN — Medication 400 MG: at 09:02

## 2018-04-20 RX ADMIN — HYDROCORTISONE SODIUM SUCCINATE 100 MG: 100 INJECTION, POWDER, FOR SOLUTION INTRAMUSCULAR; INTRAVENOUS at 17:32

## 2018-04-20 RX ADMIN — Medication 10 MCG/MIN: at 12:15

## 2018-04-20 RX ADMIN — ENOXAPARIN SODIUM 30 MG: 30 INJECTION SUBCUTANEOUS at 09:04

## 2018-04-20 RX ADMIN — FOLIC ACID 1 MG: 1 TABLET ORAL at 09:03

## 2018-04-20 RX ADMIN — Medication 4 MCG/MIN: at 00:30

## 2018-04-20 RX ADMIN — THERA TABS 1 TABLET: TAB at 09:03

## 2018-04-20 RX ADMIN — SODIUM CHLORIDE 500 ML: 900 INJECTION, SOLUTION INTRAVENOUS at 06:19

## 2018-04-20 RX ADMIN — INSULIN LISPRO 5 UNITS: 100 INJECTION, SOLUTION INTRAVENOUS; SUBCUTANEOUS at 17:31

## 2018-04-20 NOTE — PROGRESS NOTES
Cardiology Progress Note            Admit Date: 4/16/2018  Admit Diagnosis: SOB (shortness of breath)  Date: 4/20/2018     Time: 2:01 PM    HPI: 82 y.o. Female with new diagnosis of CHF. Presented with chief c/o of SOB, found to have pulmonary edema and EF 40% on TTE. Troponin elevated and trending up, EKG with poor R wave progression and now inverted t waves   In anterior leads. Pt clinical status also complicated by elevated creatinine requiring that LHC be postponed. Assessment and Plan     1. NSTEMI:  Troponin trending down from 18.9     -no chest pain   -4/18 12 lead EKG with t wave inversions anterior leads, now more prominent in V5   -TTE:  EF 40%, severe hypokinesis of apical wals, mod hypk of basal mid anteroseptal wall. Mod LVH, Mild-mod MR, mild TR   -Continue ASA, coreg, plavix   -Statin intolerant additionally LFTs elevated   -Plan for LHC on hold with probable metastatic dz    2. Cardiomyopathy/Acute HFrEF, new:  EF 40% NYHA IV on admission. Suspect NYHA III today    -improved clinically but still appears to be in failure   -Coreg held   -No Ace-I due to elevated creatinine   -Lasix 40 mg IV BID - held    3. JEROD on CKD:   creatinine 1.63-1.55 on today's labs from 1.61.    -Baseline creat 0.9-1 mg/dl   -Renal US with nonobstructing L kidney stone. And a hepatic lesion   -Nephrology following    4. Anemia: hgb 9.7 4/18   -management per renal/primary team    5. Hx of essential HTN; bp currently controlled   -Continue coreg    6. Fever:  Tmax 101.1 overnight, now normalized   -CBC ordered   -Monitor/management per primary team.    7. Elevated LFTs:  Trending up today.   -No statin    -Management/workup per primary team.    8. Hx of Colon cancer: Had findings on MRI concerning for mets. -Outpt bone scan was planned per chart. - reordered today     Appreciate Oncology seeing. MRI and bone scan ordered.   Most likely metastatic disease. Moved to unit for hypotension. On Levo only at this point. Cath on back burner, if at all. Suspect at this point Palliative Care consult appropriate. Will see as needed over the weekend. Cardiology Attending:Patient seen and examined. I agree with NP assessment and plans. Better on pressors, situation discussed with son. Magda Browne MD 4/20/2018 12:09 PM       Subjective:   Florian Render denies chest pain, abdominal pain, nausea. Note abd fullness. Objective:      Physical Exam:                Visit Vitals    BP 99/43    Pulse 80    Temp 97.9 °F (36.6 °C)    Resp 16    Ht 5' 1\" (1.549 m)    Wt 141 lb 1.5 oz (64 kg)    SpO2 95%    BMI 26.66 kg/m2          General Appearance:   Sickly looking, elderly alert female in no acute distress. Ears/Nose/Mouth/Throat:    Hearing grossly normal.         Neck:  Supple. Chest:    Lungs with bibasilar crackles. Off O2   Cardiovascular:    Regular rate and rhythm, S1, S2 normal, no murmur. Abdomen:    Soft, non-tender, bowel sounds are active. Extremities:  No edema bilaterally. Skin:  Warm and dry.      Telemetry: NSR          Data Review:    Labs:    Recent Results (from the past 24 hour(s))   GLUCOSE, POC    Collection Time: 04/19/18 11:59 AM   Result Value Ref Range    Glucose (POC) 141 (H) 65 - 100 mg/dL    Performed by He Davis, POC    Collection Time: 04/19/18  4:41 PM   Result Value Ref Range    Glucose (POC) 123 (H) 65 - 100 mg/dL    Performed by Melanie Myers    IRON PROFILE    Collection Time: 04/19/18  6:09 PM   Result Value Ref Range    Iron 12 (L) 35 - 150 ug/dL    TIBC 232 (L) 250 - 450 ug/dL    Iron % saturation 5 (L) 20 - 50 %   FERRITIN    Collection Time: 04/19/18  6:09 PM   Result Value Ref Range    Ferritin 424 (H) 8 - 252 NG/ML   RETICULOCYTE COUNT    Collection Time: 04/19/18  6:09 PM   Result Value Ref Range    Reticulocyte count 2.8 (H) 0.7 - 2.1 %    Absolute Retic Cnt. 0.0787 (H) 0.0164 - 0.0776 M/ul   LD    Collection Time: 04/19/18  6:09 PM   Result Value Ref Range     (H) 81 - 246 U/L   DIRECT & INDIRECT QUINTEN    Collection Time: 04/19/18  6:09 PM   Result Value Ref Range    UZMA Poly NEG     Antibody screen NEG    HAPTOGLOBIN    Collection Time: 04/19/18  6:09 PM   Result Value Ref Range    Haptoglobin 228 (H) 30 - 200 mg/dL   LACTIC ACID    Collection Time: 04/19/18  8:36 PM   Result Value Ref Range    Lactic acid 0.9 0.4 - 2.0 MMOL/L   CULTURE, BLOOD    Collection Time: 04/19/18  8:36 PM   Result Value Ref Range    Special Requests: NO SPECIAL REQUESTS      Culture result: NO GROWTH AFTER 8 HOURS     CULTURE, BLOOD    Collection Time: 04/19/18  8:36 PM   Result Value Ref Range    Special Requests: NO SPECIAL REQUESTS      Culture result: NO GROWTH AFTER 8 HOURS     METABOLIC PANEL, COMPREHENSIVE    Collection Time: 04/19/18  8:36 PM   Result Value Ref Range    Sodium 134 (L) 136 - 145 mmol/L    Potassium 3.3 (L) 3.5 - 5.1 mmol/L    Chloride 99 97 - 108 mmol/L    CO2 25 21 - 32 mmol/L    Anion gap 10 5 - 15 mmol/L    Glucose 123 (H) 65 - 100 mg/dL    BUN 32 (H) 6 - 20 MG/DL    Creatinine 1.53 (H) 0.55 - 1.02 MG/DL    BUN/Creatinine ratio 21 (H) 12 - 20      GFR est AA 39 (L) >60 ml/min/1.73m2    GFR est non-AA 33 (L) >60 ml/min/1.73m2    Calcium 8.2 (L) 8.5 - 10.1 MG/DL    Bilirubin, total 0.6 0.2 - 1.0 MG/DL    ALT (SGPT) 29 12 - 78 U/L    AST (SGOT) 56 (H) 15 - 37 U/L    Alk.  phosphatase 533 (H) 45 - 117 U/L    Protein, total 6.7 6.4 - 8.2 g/dL    Albumin 2.2 (L) 3.5 - 5.0 g/dL    Globulin 4.5 (H) 2.0 - 4.0 g/dL    A-G Ratio 0.5 (L) 1.1 - 2.2     CBC WITH AUTOMATED DIFF    Collection Time: 04/19/18  8:36 PM   Result Value Ref Range    WBC 13.3 (H) 3.6 - 11.0 K/uL    RBC 2.65 (L) 3.80 - 5.20 M/uL    HGB 7.6 (L) 11.5 - 16.0 g/dL    HCT 23.8 (L) 35.0 - 47.0 %    MCV 89.8 80.0 - 99.0 FL    MCH 28.7 26.0 - 34.0 PG    MCHC 31.9 30.0 - 36.5 g/dL    RDW 22.6 (H) 11.5 - 14.5 % PLATELET 621 090 - 380 K/uL    MPV 10.0 8.9 - 12.9 FL    NRBC 0.0 0  WBC    ABSOLUTE NRBC 0.00 0.00 - 0.01 K/uL    NEUTROPHILS 75 32 - 75 %    LYMPHOCYTES 8 (L) 12 - 49 %    MONOCYTES 16 (H) 5 - 13 %    EOSINOPHILS 1 0 - 7 %    BASOPHILS 0 0 - 1 %    IMMATURE GRANULOCYTES 0 %    ABS. NEUTROPHILS 10.0 (H) 1.8 - 8.0 K/UL    ABS. LYMPHOCYTES 1.1 0.8 - 3.5 K/UL    ABS. MONOCYTES 2.1 (H) 0.0 - 1.0 K/UL    ABS. EOSINOPHILS 0.1 0.0 - 0.4 K/UL    ABS. BASOPHILS 0.0 0.0 - 0.1 K/UL    ABS. IMM.  GRANS. 0.0 K/UL    DF MANUAL      RBC COMMENTS ANISOCYTOSIS  2+        RBC COMMENTS HYPOCHROMIA  1+       GLUCOSE, POC    Collection Time: 04/19/18  9:13 PM   Result Value Ref Range    Glucose (POC) 143 (H) 65 - 100 mg/dL    Performed by Williams Mcgowan    GLUCOSE, POC    Collection Time: 04/19/18 10:45 PM   Result Value Ref Range    Glucose (POC) 137 (H) 65 - 100 mg/dL    Performed by Gilbert Hull    METABOLIC PANEL, BASIC    Collection Time: 04/20/18  4:30 AM   Result Value Ref Range    Sodium 133 (L) 136 - 145 mmol/L    Potassium 3.5 3.5 - 5.1 mmol/L    Chloride 99 97 - 108 mmol/L    CO2 24 21 - 32 mmol/L    Anion gap 10 5 - 15 mmol/L    Glucose 110 (H) 65 - 100 mg/dL    BUN 36 (H) 6 - 20 MG/DL    Creatinine 1.78 (H) 0.55 - 1.02 MG/DL    BUN/Creatinine ratio 20 12 - 20      GFR est AA 33 (L) >60 ml/min/1.73m2    GFR est non-AA 27 (L) >60 ml/min/1.73m2    Calcium 8.7 8.5 - 10.1 MG/DL   CBC W/O DIFF    Collection Time: 04/20/18  4:30 AM   Result Value Ref Range    WBC 13.7 (H) 3.6 - 11.0 K/uL    RBC 2.62 (L) 3.80 - 5.20 M/uL    HGB 7.5 (L) 11.5 - 16.0 g/dL    HCT 23.6 (L) 35.0 - 47.0 %    MCV 90.1 80.0 - 99.0 FL    MCH 28.6 26.0 - 34.0 PG    MCHC 31.8 30.0 - 36.5 g/dL    RDW 22.8 (H) 11.5 - 14.5 %    PLATELET 273 435 - 634 K/uL    MPV 10.1 8.9 - 12.9 FL    NRBC 0.0 0  WBC    ABSOLUTE NRBC 0.00 0.00 - 0.01 K/uL   URINALYSIS W/ REFLEX CULTURE    Collection Time: 04/20/18  4:30 AM   Result Value Ref Range    Color YELLOW/STRAW      Appearance TURBID (A) CLEAR      Specific gravity 1.029 1.003 - 1.030      pH (UA) 5.0 5.0 - 8.0      Protein 100 (A) NEG mg/dL    Glucose NEGATIVE  NEG mg/dL    Ketone NEGATIVE  NEG mg/dL    Bilirubin NEGATIVE  NEG      Blood LARGE (A) NEG      Urobilinogen 0.2 0.2 - 1.0 EU/dL    Nitrites NEGATIVE  NEG      Leukocyte Esterase LARGE (A) NEG      WBC >100 (H) 0 - 4 /hpf    RBC 5-10 0 - 5 /hpf    Epithelial cells FEW FEW /lpf    Bacteria 3+ (A) NEG /hpf    UA:UC IF INDICATED URINE CULTURE ORDERED (A) CNI     GLUCOSE, POC    Collection Time: 04/20/18  6:26 AM   Result Value Ref Range    Glucose (POC) 125 (H) 65 - 100 mg/dL    Performed by Lucien Merlin           Radiology:        Current Facility-Administered Medications   Medication Dose Route Frequency    albumin human 25% (BUMINATE) solution 12.5 g  12.5 g IntraVENous Q6H    hydrocortisone Sod Succ (PF) (SOLU-CORTEF) injection 50 mg  50 mg IntraVENous Q8H    hydrocortisone Sod Succ (PF) (SOLU-CORTEF) injection 100 mg  100 mg IntraVENous ONCE    0.9% sodium chloride infusion  75 mL/hr IntraVENous CONTINUOUS    piperacillin-tazobactam (ZOSYN) 3.375 g in 0.9% sodium chloride (MBP/ADV) 100 mL  3.375 g IntraVENous Q8H    NOREPINephrine (LEVOPHED) 8 mg in 5% dextrose 250mL infusion  2-30 mcg/min IntraVENous TITRATE    PHENYLephrine (PF)(RICARDO-SYNEPHRINE) 30 mg in 0.9% sodium chloride 250 mL infusion   mcg/min IntraVENous TITRATE    Vancomycin - pharmacy to dose   Other Rx Dosing/Monitoring    lidocaine (PF) (XYLOCAINE) 10 mg/mL (1 %) injection        vancomycin (VANCOCIN) 750 mg in 0.9% sodium chloride (MBP/ADV) 250 mL  750 mg IntraVENous Q24H    glucose chewable tablet 16 g  4 Tab Oral PRN    dextrose (D50W) injection syrg 12.5-25 g  12.5-25 g IntraVENous PRN    glucagon (GLUCAGEN) injection 1 mg  1 mg IntraMUSCular PRN    insulin lispro (HUMALOG) injection   SubCUTAneous AC&HS    prochlorperazine (COMPAZINE) with saline injection 5 mg  5 mg IntraVENous Q8H PRN    ascorbic acid (vitamin C) (VITAMIN C) tablet 500 mg  500 mg Oral DAILY    aspirin delayed-release tablet 81 mg  81 mg Oral DAILY    cholecalciferol (VITAMIN D3) tablet 1,000 Units  1,000 Units Oral DAILY    clopidogrel (PLAVIX) tablet 75 mg  75 mg Oral DAILY    folic acid (FOLVITE) tablet 1 mg  1 mg Oral DAILY    glimepiride (AMARYL) tablet 1 mg  1 mg Oral 7am    magnesium oxide (MAG-OX) tablet 400 mg  400 mg Oral DAILY    therapeutic multivitamin (THERAGRAN) tablet 1 Tab  1 Tab Oral DAILY    fish oil-omega-3 fatty acids 340-1,000 mg capsule 1 Cap  1 Cap Oral DAILY    SITagliptin (JANUVIA) tablet tab 50 mg  50 mg Oral DAILY    sodium chloride (NS) flush 5-10 mL  5-10 mL IntraVENous Q8H    sodium chloride (NS) flush 5-10 mL  5-10 mL IntraVENous PRN    acetaminophen (TYLENOL) tablet 650 mg  650 mg Oral Q4H PRN    ondansetron (ZOFRAN) injection 4 mg  4 mg IntraVENous Q4H PRN    enoxaparin (LOVENOX) injection 30 mg  30 mg SubCUTAneous Q24H    levothyroxine (SYNTHROID) tablet 88 mcg  88 mcg Oral ACB          Maria M Plunkett PA-C     [facility] [address] [place] [address] 13, [address] [facility] [address]   Aidan [place]   [phone]

## 2018-04-20 NOTE — PROGRESS NOTES
I returned page nurse who reports that patient's urine output is low (~ 50 ml total for past 3 hours). Patient had been hypotensive. She was placed on Levophed IV infusion which has marginally improved BP. However as patient is oliguric, I have ordered one additional bolus of 0.9%  ml over 2 hours. Will still have to very cautious with IVFs due to not concerns of CHF. In interim, await results of repeat CBC. If hemoglobin is less than 7.0, will recommend to transfuse 2 units PRBCs. Also per review of labs previously collected, patient appears iron deficient. Consult with hematologist regarding replacement therapy in the a.m.

## 2018-04-20 NOTE — PROGRESS NOTES
Hematology-Oncology Progress Note    Umberto Brunner  1935  [id number]  4/20/2018    Follow-up for: abnormal brain MRI     [x]        Chart notes since last visit reviewed   [x]        Medications reviewed for allergies and interactions       Case discussed with the following:         []                            [x]        Nursing Staff                                                                         []        Pathologist                                                                        [x]        FAMILY      Subjective:     Spoke with patient who complains of: pt is weak, no c/o pain, moved to ICU for hypotension this am    Objective:   Patient Vitals for the past 24 hrs:   BP Temp Pulse Resp SpO2 Weight   04/20/18 0700 99/43 - 80 16 95 % -   04/20/18 0600 98/41 - 80 17 91 % -   04/20/18 0500 106/50 - 84 20 95 % -   04/20/18 0400 105/50 97.9 °F (36.6 °C) 84 20 97 % 64 kg (141 lb 1.5 oz)   04/20/18 0300 103/45 - 88 21 94 % -   04/20/18 0200 98/48 - 86 21 92 % -   04/20/18 0100 105/51 - 90 18 95 % -   04/20/18 0000 97/45 99 °F (37.2 °C) 92 21 99 % -   04/19/18 2300 (!) 86/41 - 85 19 100 % -   04/19/18 2200 (!) 85/41 - 89 21 94 % -   04/19/18 2150 (!) 85/42 98.8 °F (37.1 °C) 91 20 95 % -   04/19/18 2040 - 98.8 °F (37.1 °C) - - - -   04/19/18 1734 (!) 104/35 98.9 °F (37.2 °C) - - - -   04/19/18 1602 136/40 (!) 103.2 °F (39.6 °C) (!) 106 28 100 % -       REVIEW OF SYSTEMS:    Constitutional: negative fever, negative chills, negative weight loss  Eyes:   negative visual changes  ENT:   negative sore throat, tongue or lip swelling  Respiratory:  negative cough, negative dyspnea  Cards:  negative for chest pain, palpitations, lower extremity edema  GI:   negative for nausea, vomiting, diarrhea, and abdominal pain  Neuro:  negative for headaches, dizziness, vertigo  []                        Full ROS o/w normal/non contributor    Constitutional:  Patient looks  []        Sick  [x]        Frail  [] Better                                                 []        Depressed    HEENT:  [x]   NC                         []   AT               []    ALOPECIA           Eyes: [x]   Normal               []    Icteric  Oropharynx: []    Normal                  []  Thrush               []   Dry  Mucositis: []    None                 Grade: []        I  []        II  []        III  []        IV  Neck:   [x]   Supple                  []  Rigid                 Skin:  Intact [x]           Purpura []        Rash: []   ABSENT       []  PRESENT  Neuro:  [x]        Normal  []        Confused      Available labs reviewed:  Labs:    Recent Results (from the past 24 hour(s))   GLUCOSE, POC    Collection Time: 04/19/18 11:59 AM   Result Value Ref Range    Glucose (POC) 141 (H) 65 - 100 mg/dL    Performed by He Davis, POC    Collection Time: 04/19/18  4:41 PM   Result Value Ref Range    Glucose (POC) 123 (H) 65 - 100 mg/dL    Performed by Noble Hernandez    IRON PROFILE    Collection Time: 04/19/18  6:09 PM   Result Value Ref Range    Iron 12 (L) 35 - 150 ug/dL    TIBC 232 (L) 250 - 450 ug/dL    Iron % saturation 5 (L) 20 - 50 %   FERRITIN    Collection Time: 04/19/18  6:09 PM   Result Value Ref Range    Ferritin 424 (H) 8 - 252 NG/ML   RETICULOCYTE COUNT    Collection Time: 04/19/18  6:09 PM   Result Value Ref Range    Reticulocyte count 2.8 (H) 0.7 - 2.1 %    Absolute Retic Cnt. 0.0787 (H) 0.0164 - 0.0776 M/ul   LD    Collection Time: 04/19/18  6:09 PM   Result Value Ref Range     (H) 81 - 246 U/L   DIRECT & INDIRECT QUINTEN    Collection Time: 04/19/18  6:09 PM   Result Value Ref Range    UZMA Poly NEG     Antibody screen NEG    HAPTOGLOBIN    Collection Time: 04/19/18  6:09 PM   Result Value Ref Range    Haptoglobin 228 (H) 30 - 200 mg/dL   LACTIC ACID    Collection Time: 04/19/18  8:36 PM   Result Value Ref Range    Lactic acid 0.9 0.4 - 2.0 MMOL/L   CULTURE, BLOOD    Collection Time: 04/19/18  8:36 PM   Result Value Ref Range    Special Requests: NO SPECIAL REQUESTS      Culture result: NO GROWTH AFTER 8 HOURS     CULTURE, BLOOD    Collection Time: 04/19/18  8:36 PM   Result Value Ref Range    Special Requests: NO SPECIAL REQUESTS      Culture result: NO GROWTH AFTER 8 HOURS     METABOLIC PANEL, COMPREHENSIVE    Collection Time: 04/19/18  8:36 PM   Result Value Ref Range    Sodium 134 (L) 136 - 145 mmol/L    Potassium 3.3 (L) 3.5 - 5.1 mmol/L    Chloride 99 97 - 108 mmol/L    CO2 25 21 - 32 mmol/L    Anion gap 10 5 - 15 mmol/L    Glucose 123 (H) 65 - 100 mg/dL    BUN 32 (H) 6 - 20 MG/DL    Creatinine 1.53 (H) 0.55 - 1.02 MG/DL    BUN/Creatinine ratio 21 (H) 12 - 20      GFR est AA 39 (L) >60 ml/min/1.73m2    GFR est non-AA 33 (L) >60 ml/min/1.73m2    Calcium 8.2 (L) 8.5 - 10.1 MG/DL    Bilirubin, total 0.6 0.2 - 1.0 MG/DL    ALT (SGPT) 29 12 - 78 U/L    AST (SGOT) 56 (H) 15 - 37 U/L    Alk. phosphatase 533 (H) 45 - 117 U/L    Protein, total 6.7 6.4 - 8.2 g/dL    Albumin 2.2 (L) 3.5 - 5.0 g/dL    Globulin 4.5 (H) 2.0 - 4.0 g/dL    A-G Ratio 0.5 (L) 1.1 - 2.2     CBC WITH AUTOMATED DIFF    Collection Time: 04/19/18  8:36 PM   Result Value Ref Range    WBC 13.3 (H) 3.6 - 11.0 K/uL    RBC 2.65 (L) 3.80 - 5.20 M/uL    HGB 7.6 (L) 11.5 - 16.0 g/dL    HCT 23.8 (L) 35.0 - 47.0 %    MCV 89.8 80.0 - 99.0 FL    MCH 28.7 26.0 - 34.0 PG    MCHC 31.9 30.0 - 36.5 g/dL    RDW 22.6 (H) 11.5 - 14.5 %    PLATELET 952 123 - 924 K/uL    MPV 10.0 8.9 - 12.9 FL    NRBC 0.0 0  WBC    ABSOLUTE NRBC 0.00 0.00 - 0.01 K/uL    NEUTROPHILS 75 32 - 75 %    LYMPHOCYTES 8 (L) 12 - 49 %    MONOCYTES 16 (H) 5 - 13 %    EOSINOPHILS 1 0 - 7 %    BASOPHILS 0 0 - 1 %    IMMATURE GRANULOCYTES 0 %    ABS. NEUTROPHILS 10.0 (H) 1.8 - 8.0 K/UL    ABS. LYMPHOCYTES 1.1 0.8 - 3.5 K/UL    ABS. MONOCYTES 2.1 (H) 0.0 - 1.0 K/UL    ABS. EOSINOPHILS 0.1 0.0 - 0.4 K/UL    ABS. BASOPHILS 0.0 0.0 - 0.1 K/UL    ABS. IMM.  GRANS. 0.0 K/UL    DF MANUAL      RBC COMMENTS ANISOCYTOSIS  2+        RBC COMMENTS HYPOCHROMIA  1+       GLUCOSE, POC    Collection Time: 04/19/18  9:13 PM   Result Value Ref Range    Glucose (POC) 143 (H) 65 - 100 mg/dL    Performed by Merline Balboa    GLUCOSE, POC    Collection Time: 04/19/18 10:45 PM   Result Value Ref Range    Glucose (POC) 137 (H) 65 - 100 mg/dL    Performed by Max Sacks    METABOLIC PANEL, BASIC    Collection Time: 04/20/18  4:30 AM   Result Value Ref Range    Sodium 133 (L) 136 - 145 mmol/L    Potassium 3.5 3.5 - 5.1 mmol/L    Chloride 99 97 - 108 mmol/L    CO2 24 21 - 32 mmol/L    Anion gap 10 5 - 15 mmol/L    Glucose 110 (H) 65 - 100 mg/dL    BUN 36 (H) 6 - 20 MG/DL    Creatinine 1.78 (H) 0.55 - 1.02 MG/DL    BUN/Creatinine ratio 20 12 - 20      GFR est AA 33 (L) >60 ml/min/1.73m2    GFR est non-AA 27 (L) >60 ml/min/1.73m2    Calcium 8.7 8.5 - 10.1 MG/DL   CBC W/O DIFF    Collection Time: 04/20/18  4:30 AM   Result Value Ref Range    WBC 13.7 (H) 3.6 - 11.0 K/uL    RBC 2.62 (L) 3.80 - 5.20 M/uL    HGB 7.5 (L) 11.5 - 16.0 g/dL    HCT 23.6 (L) 35.0 - 47.0 %    MCV 90.1 80.0 - 99.0 FL    MCH 28.6 26.0 - 34.0 PG    MCHC 31.8 30.0 - 36.5 g/dL    RDW 22.8 (H) 11.5 - 14.5 %    PLATELET 933 617 - 675 K/uL    MPV 10.1 8.9 - 12.9 FL    NRBC 0.0 0  WBC    ABSOLUTE NRBC 0.00 0.00 - 0.01 K/uL   URINALYSIS W/ REFLEX CULTURE    Collection Time: 04/20/18  4:30 AM   Result Value Ref Range    Color YELLOW/STRAW      Appearance TURBID (A) CLEAR      Specific gravity 1.029 1.003 - 1.030      pH (UA) 5.0 5.0 - 8.0      Protein 100 (A) NEG mg/dL    Glucose NEGATIVE  NEG mg/dL    Ketone NEGATIVE  NEG mg/dL    Bilirubin NEGATIVE  NEG      Blood LARGE (A) NEG      Urobilinogen 0.2 0.2 - 1.0 EU/dL    Nitrites NEGATIVE  NEG      Leukocyte Esterase LARGE (A) NEG      WBC >100 (H) 0 - 4 /hpf    RBC 5-10 0 - 5 /hpf    Epithelial cells FEW FEW /lpf    Bacteria 3+ (A) NEG /hpf    UA:UC IF INDICATED URINE CULTURE ORDERED (A) CNI GLUCOSE, POC    Collection Time: 04/20/18  6:26 AM   Result Value Ref Range    Glucose (POC) 125 (H) 65 - 100 mg/dL    Performed by Alex Iyer reviewed:    Chemotherapy monitored and toxicities assessed:    Assessment and Plan   1. Calvarial \"lesions\" seen on brain MRI march 2018. .. These are being worked up with bone scan, tumor markers and spep/flc. .. If above unremarkable consider bone marrow bx next week  2. Anemia. .. Pt has renal insuf/ and infection. .. These alone can cause this,, the haptoglobin is >200 ruling out hemolysis. . Awaiting rest of w/u  3. Liver \"lesion \" on U/S. .. MRI abd. Ordered  4. CHF. [facility] Per cardiology. . They are considering doing cardiac cath  Monie Tipton MD

## 2018-04-20 NOTE — PROGRESS NOTES
Hospitalist Progress Note            Daily Progress Note: 2018    Assessment/Plan:   1. Sepsis and septic shock sec to UTI: Started on IV antibiotics (Vanc and zosyn) f/u on blcx, ucx. ucx with pseudomonas. On pressors, stress dose steroids. 2. NSTEMI - Appreciate cardiology help; continue ASA, Plavix as per cardiology; No ACEI or ARBs sec to JEROD, low BP. Cath plane per cardiology  3. Acute systolic CHF likely sec to No#1 - EF 35 - 45% by echo on  (down from 60% by echo in ); Lasix on hold sec to JEROD. 4. JEROD on CKD-3 : Worsened sec to hypotension; appreciate nephrology input. 5. HTN: BP on lower side,  Now on levophed. 6. DM - on Januvia and Amaryl  7. H/o hypothyroidism - continue Synthroid  8. GERD  9. Elevated LFTs: likely shock liver. 8. H/o colon CA 25 years ago - according to son, there is a work-up planned with [facility] regarding the possibility of recurrent CA (based on lesions seen in the calvarium on MRI - 3/19). Consulted oncology, per onc bone scan, MRI to see mets. Subjective:   1. Over night became hypotensive did not respond IVF boluses and albumin, ICU transfer for pressors. Review of Systems:   No c/o CP. Sleepy. Objective:     Visit Vitals    /66    Pulse 85    Temp 98.1 °F (36.7 °C)    Resp 18    Ht 5' 1\" (1.549 m)    Wt 64 kg (141 lb)    SpO2 97%    BMI 26.64 kg/m2    O2 Flow Rate (L/min): 2 l/min O2 Device: Room air    Temp (24hrs), Av.4 °F (36.9 °C), Min:97.9 °F (36.6 °C), Max:99 °F (37.2 °C)      701 - 1900  In: 1072.3 [I.V.:1072.3]  Out: 405 [Urine:405]    1901 -  0700  In: 2908.3 [P.O.:240; I.V.:2668.3]  Out: 1540 [Urine:1540]    EXAM:  General: Sleepy  HEENT: NC, atraumatic,   Neck:   Supple,   Lungs:  Bibasilar crackles. No Wheezing. Heart:  Regular rhythm,   Abdomen: Soft, Non distended, Non tender.  +Bowel sounds,  Extremities: No c/c/e  Neurologic:   Alert,.   No acute neurological distress Data Review:     Recent Results (from the past 24 hour(s))   LACTIC ACID    Collection Time: 04/19/18  8:36 PM   Result Value Ref Range    Lactic acid 0.9 0.4 - 2.0 MMOL/L   CULTURE, BLOOD    Collection Time: 04/19/18  8:36 PM   Result Value Ref Range    Special Requests: NO SPECIAL REQUESTS      Culture result: NO GROWTH AFTER 8 HOURS     CULTURE, BLOOD    Collection Time: 04/19/18  8:36 PM   Result Value Ref Range    Special Requests: NO SPECIAL REQUESTS      Culture result: NO GROWTH AFTER 8 HOURS     METABOLIC PANEL, COMPREHENSIVE    Collection Time: 04/19/18  8:36 PM   Result Value Ref Range    Sodium 134 (L) 136 - 145 mmol/L    Potassium 3.3 (L) 3.5 - 5.1 mmol/L    Chloride 99 97 - 108 mmol/L    CO2 25 21 - 32 mmol/L    Anion gap 10 5 - 15 mmol/L    Glucose 123 (H) 65 - 100 mg/dL    BUN 32 (H) 6 - 20 MG/DL    Creatinine 1.53 (H) 0.55 - 1.02 MG/DL    BUN/Creatinine ratio 21 (H) 12 - 20      GFR est AA 39 (L) >60 ml/min/1.73m2    GFR est non-AA 33 (L) >60 ml/min/1.73m2    Calcium 8.2 (L) 8.5 - 10.1 MG/DL    Bilirubin, total 0.6 0.2 - 1.0 MG/DL    ALT (SGPT) 29 12 - 78 U/L    AST (SGOT) 56 (H) 15 - 37 U/L    Alk. phosphatase 533 (H) 45 - 117 U/L    Protein, total 6.7 6.4 - 8.2 g/dL    Albumin 2.2 (L) 3.5 - 5.0 g/dL    Globulin 4.5 (H) 2.0 - 4.0 g/dL    A-G Ratio 0.5 (L) 1.1 - 2.2     CBC WITH AUTOMATED DIFF    Collection Time: 04/19/18  8:36 PM   Result Value Ref Range    WBC 13.3 (H) 3.6 - 11.0 K/uL    RBC 2.65 (L) 3.80 - 5.20 M/uL    HGB 7.6 (L) 11.5 - 16.0 g/dL    HCT 23.8 (L) 35.0 - 47.0 %    MCV 89.8 80.0 - 99.0 FL    MCH 28.7 26.0 - 34.0 PG    MCHC 31.9 30.0 - 36.5 g/dL    RDW 22.6 (H) 11.5 - 14.5 %    PLATELET 431 081 - 922 K/uL    MPV 10.0 8.9 - 12.9 FL    NRBC 0.0 0  WBC    ABSOLUTE NRBC 0.00 0.00 - 0.01 K/uL    NEUTROPHILS 75 32 - 75 %    LYMPHOCYTES 8 (L) 12 - 49 %    MONOCYTES 16 (H) 5 - 13 %    EOSINOPHILS 1 0 - 7 %    BASOPHILS 0 0 - 1 %    IMMATURE GRANULOCYTES 0 %    ABS. NEUTROPHILS 10.0 (H) 1.8 - 8.0 K/UL    ABS. LYMPHOCYTES 1.1 0.8 - 3.5 K/UL    ABS. MONOCYTES 2.1 (H) 0.0 - 1.0 K/UL    ABS. EOSINOPHILS 0.1 0.0 - 0.4 K/UL    ABS. BASOPHILS 0.0 0.0 - 0.1 K/UL    ABS. IMM.  GRANS. 0.0 K/UL    DF MANUAL      RBC COMMENTS ANISOCYTOSIS  2+        RBC COMMENTS HYPOCHROMIA  1+       GLUCOSE, POC    Collection Time: 04/19/18  9:13 PM   Result Value Ref Range    Glucose (POC) 143 (H) 65 - 100 mg/dL    Performed by Heidi Salamanca    GLUCOSE, POC    Collection Time: 04/19/18 10:45 PM   Result Value Ref Range    Glucose (POC) 137 (H) 65 - 100 mg/dL    Performed by Filomena Hernandez    METABOLIC PANEL, BASIC    Collection Time: 04/20/18  4:30 AM   Result Value Ref Range    Sodium 133 (L) 136 - 145 mmol/L    Potassium 3.5 3.5 - 5.1 mmol/L    Chloride 99 97 - 108 mmol/L    CO2 24 21 - 32 mmol/L    Anion gap 10 5 - 15 mmol/L    Glucose 110 (H) 65 - 100 mg/dL    BUN 36 (H) 6 - 20 MG/DL    Creatinine 1.78 (H) 0.55 - 1.02 MG/DL    BUN/Creatinine ratio 20 12 - 20      GFR est AA 33 (L) >60 ml/min/1.73m2    GFR est non-AA 27 (L) >60 ml/min/1.73m2    Calcium 8.7 8.5 - 10.1 MG/DL   CBC W/O DIFF    Collection Time: 04/20/18  4:30 AM   Result Value Ref Range    WBC 13.7 (H) 3.6 - 11.0 K/uL    RBC 2.62 (L) 3.80 - 5.20 M/uL    HGB 7.5 (L) 11.5 - 16.0 g/dL    HCT 23.6 (L) 35.0 - 47.0 %    MCV 90.1 80.0 - 99.0 FL    MCH 28.6 26.0 - 34.0 PG    MCHC 31.8 30.0 - 36.5 g/dL    RDW 22.8 (H) 11.5 - 14.5 %    PLATELET 352 661 - 767 K/uL    MPV 10.1 8.9 - 12.9 FL    NRBC 0.0 0  WBC    ABSOLUTE NRBC 0.00 0.00 - 0.01 K/uL   URINALYSIS W/ REFLEX CULTURE    Collection Time: 04/20/18  4:30 AM   Result Value Ref Range    Color YELLOW/STRAW      Appearance TURBID (A) CLEAR      Specific gravity 1.029 1.003 - 1.030      pH (UA) 5.0 5.0 - 8.0      Protein 100 (A) NEG mg/dL    Glucose NEGATIVE  NEG mg/dL    Ketone NEGATIVE  NEG mg/dL    Bilirubin NEGATIVE  NEG      Blood LARGE (A) NEG      Urobilinogen 0.2 0.2 - 1.0 EU/dL    Nitrites NEGATIVE  NEG      Leukocyte Esterase LARGE (A) NEG      WBC >100 (H) 0 - 4 /hpf    RBC 5-10 0 - 5 /hpf    Epithelial cells FEW FEW /lpf    Bacteria 3+ (A) NEG /hpf    UA:UC IF INDICATED URINE CULTURE ORDERED (A) CNI     GLUCOSE, POC    Collection Time: 04/20/18  6:26 AM   Result Value Ref Range    Glucose (POC) 125 (H) 65 - 100 mg/dL    Performed by Mario Randall    CEA    Collection Time: 04/20/18  9:30 AM   Result Value Ref Range    CEA 11.5 ng/mL   CORTISOL    Collection Time: 04/20/18  9:30 AM   Result Value Ref Range    Cortisol, random 31.1 ug/dL   GLUCOSE, POC    Collection Time: 04/20/18 11:44 AM   Result Value Ref Range    Glucose (POC) 169 (H) 65 - 100 mg/dL    Performed by Cristino Amador., POC    Collection Time: 04/20/18  5:19 PM   Result Value Ref Range    Glucose (POC) 280 (H) 65 - 100 mg/dL    Performed by María Morris        Principal Problem:    SOB (shortness of breath) (4/16/2018)        Medications reviewed  Current Facility-Administered Medications   Medication Dose Route Frequency    famotidine (PF) (PEPCID) 20 mg in sodium chloride 0.9% 10 mL injection  20 mg IntraVENous DAILY    hydrocortisone Sod Succ (PF) (SOLU-CORTEF) injection 100 mg  100 mg IntraVENous Q8H    albumin human 25% (BUMINATE) solution 25 g  25 g IntraVENous Q6H    piperacillin-tazobactam (ZOSYN) 3.375 g in 0.9% sodium chloride (MBP/ADV) 100 mL  3.375 g IntraVENous Q8H    [START ON 4/21/2018] vancomycin (VANCOCIN) 750 mg in 0.9% sodium chloride (MBP/ADV) 250 mL  750 mg IntraVENous Q36H    0.9% sodium chloride infusion  50 mL/hr IntraVENous CONTINUOUS    NOREPINephrine (LEVOPHED) 8 mg in 5% dextrose 250mL infusion  2-30 mcg/min IntraVENous TITRATE    PHENYLephrine (PF)(RICARDO-SYNEPHRINE) 30 mg in 0.9% sodium chloride 250 mL infusion   mcg/min IntraVENous TITRATE    Vancomycin - pharmacy to dose   Other Rx Dosing/Monitoring    glucose chewable tablet 16 g  4 Tab Oral PRN    dextrose (D50W) injection syrg 12.5-25 g  12.5-25 g IntraVENous PRN    glucagon (GLUCAGEN) injection 1 mg  1 mg IntraMUSCular PRN    insulin lispro (HUMALOG) injection   SubCUTAneous AC&HS    prochlorperazine (COMPAZINE) with saline injection 5 mg  5 mg IntraVENous Q8H PRN    ascorbic acid (vitamin C) (VITAMIN C) tablet 500 mg  500 mg Oral DAILY    aspirin delayed-release tablet 81 mg  81 mg Oral DAILY    cholecalciferol (VITAMIN D3) tablet 1,000 Units  1,000 Units Oral DAILY    clopidogrel (PLAVIX) tablet 75 mg  75 mg Oral DAILY    folic acid (FOLVITE) tablet 1 mg  1 mg Oral DAILY    glimepiride (AMARYL) tablet 1 mg  1 mg Oral 7am    magnesium oxide (MAG-OX) tablet 400 mg  400 mg Oral DAILY    therapeutic multivitamin (THERAGRAN) tablet 1 Tab  1 Tab Oral DAILY    fish oil-omega-3 fatty acids 340-1,000 mg capsule 1 Cap  1 Cap Oral DAILY    SITagliptin (JANUVIA) tablet tab 50 mg  50 mg Oral DAILY    sodium chloride (NS) flush 5-10 mL  5-10 mL IntraVENous Q8H    sodium chloride (NS) flush 5-10 mL  5-10 mL IntraVENous PRN    acetaminophen (TYLENOL) tablet 650 mg  650 mg Oral Q4H PRN    ondansetron (ZOFRAN) injection 4 mg  4 mg IntraVENous Q4H PRN    enoxaparin (LOVENOX) injection 30 mg  30 mg SubCUTAneous Q24H    levothyroxine (SYNTHROID) tablet 88 mcg  88 mcg Oral ACB       DVT prophylaxis: Lovenox SC    Total time spent with patient: 30 minutes    Héctor Dutta MD

## 2018-04-20 NOTE — PROGRESS NOTES
Problem: Mobility Impaired (Adult and Pediatric)  Goal: *Acute Goals and Plan of Care (Insert Text)  Physical Therapy Goals  Initiated 4/20/2018  1. Patient will move from supine to sit and sit to supine , scoot up and down and roll side to side in bed with minimal assistance/contact guard assist within 7 day(s). 2.  Patient will transfer from bed to chair and chair to bed with minimal assistance/contact guard assist using the least restrictive device within 7 day(s). 3.  Patient will perform sit to stand with minimal assistance/contact guard assist within 7 day(s). 4.  Patient will ambulate with minimal assistance/contact guard assist for 25 feet with the least restrictive device within 7 day(s). 5.  Patient will ascend/descend 3 stairs with 2 handrail(s) with minimal assistance/contact guard assist within 7 day(s). physical Therapy EVALUATION  Patient: Richelle Shore (82 y.o. female)  Date: 4/20/2018  Primary Diagnosis: SOB (shortness of breath)        Precautions:   Fall    ASSESSMENT :  Based on the objective data described below, the patient presents with decreased independence with mobility compared to baseline level prior to admission due to decreased strength, decreased AROM,  impaired balance, intermittent confusion, and lack of initiative to do things independently. Seems to rely on family for assistance. Patient cleared by nursing for mobility and agreeable to participate in mobility assessment. Patient vital signs within normal limits. Patient able to complete sit to stand tx from bedchair position with mod A x 2 and additional time. Standing tolerance very low- few seconds at best with forward flexed posture. Able to assist with scooting and positioning in chair. Once sitting, patient participated in strength assessment revealing L LE weakness consistent with Hx of chronic stroke. No reports of increased pain/discomfort in sitting. She is a high fall risk at this time.  Patient agreeable to sit in chair position to finish breakfast. Educated patient on impaired balance and encouraged mobility with assistance/supervision. Will continue to follow to progress towards acute care goals. Recommend SNF vs HHPT at d/c to continue to optimize independence and safety with mobility. .    Patient will benefit from skilled intervention to address the above impairments. Patients rehabilitation potential is considered to be Fair  Factors which may influence rehabilitation potential include:   []         None noted  []         Mental ability/status  [x]         Medical condition  []         Home/family situation and support systems  []         Safety awareness  []         Pain tolerance/management  []         Other:      PLAN :  Recommendations and Planned Interventions:  []           Bed Mobility Training             []    Neuromuscular Re-Education  []           Transfer Training                   []    Orthotic/Prosthetic Training  []           Gait Training                         []    Modalities  []           Therapeutic Exercises           []    Edema Management/Control  []           Therapeutic Activities            []    Patient and Family Training/Education  []           Other (comment):    Frequency/Duration: Patient will be followed by physical therapy  5 times a week to address goals. Discharge Recommendations: Home Health and [address] Recommendations for Discharge: has rollator     SUBJECTIVE:   Patient stated Im weak.     OBJECTIVE DATA SUMMARY:   HISTORY:    Past Medical History:   Diagnosis Date    Anemia 9/2/2009    Colon cancer ([facility] [address] 9/2/2009    surgery/chemo    Colon cancer ([facility] [address] 9/2/2009    DM (diabetes mellitus) ([facility] [facility] 9/2/2009    GERD (gastroesophageal reflux disease)     H/O: CVA 9/2/2009    slight l sided weakness    Hypothyroid 9/2/2009    Ill-defined condition     seasonal allergies    Microalbuminuria 9/2/2009    Osteoporosis 9/2/2009    Other and unspecified hyperlipidemia 1/27/2010    Rheumatoid arthritis involving ankle ([address] 9/28/2016    Rheumatoid arthritis(714.0) 9/2/2009    Unspecified essential hypertension 9/2/2009    Weakness due to cerebrovascular accident      Past Surgical History:   Procedure Laterality Date    HX COLECTOMY      HX HYSTERECTOMY      HX OTHER SURGICAL      colonoscopies numerous since 1993     Prior Level of Function/Home Situation: mod I with rollator  Personal factors and/or comorbidities impacting plan of care:     Home Situation  Home Environment: Private residence  # Steps to Enter: 4  One/Two Story Residence: Two story, live on 1st floor  Living Alone: Yes  Support Systems: Home care staff, Child(madi), Family member(s)  Patient Expects to be Discharged to[de-identified] Private residence  Current DME Used/Available at Home: Barbara Allyssa, rollator, Safety frame [employer], Shower chair  Tub or Shower Type: Tub/Shower combination    EXAMINATION/PRESENTATION/DECISION MAKING:   Critical Behavior:  Neurologic State: Alert  Orientation Level: Oriented X4  Cognition: Follows commands, Appropriate safety awareness, Appropriate for age attention/concentration, Appropriate decision making  Safety/Judgement: Awareness of environment, Fall prevention, Good awareness of safety precautions, Home safety, Insight into deficits  Hearing:   Auditory  Auditory Impairment: None    Range Of Motion:  AROM: Generally decreased, functional (LUE decreased at baseline d/t CVA)           PROM: Within functional limits           Strength:    Strength: Generally decreased, functional (LUE decreased at baseline d/t CVA)                    Tone & Sensation:   Tone: Normal              Sensation: Intact (no c/o numbness/tingling)               Coordination:  Coordination: Generally decreased, functional  Vision:   Tracking: Able to track stimulus in all quadrants w/o difficulty  Diplopia: No  Functional Mobility:  Bed Mobility:     Supine to Sit:  (NT pt placed in bed/chair position)        Transfers:  Sit to Stand: Minimum assistance; Moderate assistance;Assist x2  Stand to Sit: Minimum assistance;Assist x2                       Balance:   Sitting: Impaired; Without support  Sitting - Static: Good (unsupported)  Sitting - Dynamic: Fair (occasional)  Standing: Impaired; With support  Standing - Static: Fair  Standing - Dynamic : Fair  Ambulation/Gait Training:                      Functional Measure:  Timed up and go:    Timed Get Up And Go Test: 0     Timed Up and Go and G-code impairment scale:  Percentage of Impairment CH    0%   CI    1-19% CJ    20-39% CK    40-59% CL    60-79% CM    80-99% CN     100%   Timed   Score 0-56 10 11-12 13-14 15-16 17-18 19 20       < than 10 seconds=Normal  Greater then 13.5 seconds (in elderly)=Increased fall risk   Shaw Montaño [place] M. Predicting the probability for falls in community dwelling older adults using the Timed Up and Go Test. Phys Ther. 2000;80:896-903. G codes: In compliance with CMSs Claims Based Outcome Reporting, the following G-code set was chosen for this patient based on their primary functional limitation being treated: The outcome measure chosen to determine the severity of the functional limitation was the TUG with a score of 0/unable which was correlated with the impairment scale.     ? Mobility - Walking and Moving Around:     - CURRENT STATUS: CN - 100% impaired, limited or restricted    - GOAL STATUS: CM - 80%-99% impaired, limited or restricted    - D/C STATUS:  ---------------To be determined---------------      Physical Therapy Evaluation Charge Determination   History Examination Presentation Decision-Making   HIGH Complexity :3+ comorbidities / personal factors will impact the outcome/ POC  MEDIUM Complexity : 3 Standardized tests and measures addressing body structure, function, activity limitation and / or participation in recreation  LOW Complexity : Stable, uncomplicated  MEDIUM Complexity : FOTO score of 26-74      Based on the above components, the patient evaluation is determined to be of the following complexity level: LOW     Pain:  Pain Scale 1: Numeric (0 - 10)  Pain Intensity 1: 0              Activity Tolerance:   VSS  Please refer to the flowsheet for vital signs taken during this treatment. After treatment:   [x]         Patient left in no apparent distress sitting up in chair  []         Patient left in no apparent distress in bed  [x]         Call bell left within reach  [x]         Nursing notified  [x]         Caregiver present  [x]         Bed alarm activated    COMMUNICATION/EDUCATION:   The patients plan of care was discussed with: Occupational Therapist and Registered Nurse. [x]         Fall prevention education was provided and the patient/caregiver indicated understanding. [x]         Patient/family have participated as able in goal setting and plan of care. [x]         Patient/family agree to work toward stated goals and plan of care. []         Patient understands intent and goals of therapy, but is neutral about his/her participation. []         Patient is unable to participate in goal setting and plan of care.     Thank you for this referral.  Gemini Lugo, PT ,DPT   Time Calculation: 19 mins

## 2018-04-20 NOTE — CONSULTS
PULMONARY/CRITICAL CARE/SLEEP MEDICINE    Initial Physician Consultation Note- Intensivist    Name: Yusra Arnold   : 1935   MRN: [id number]   Date: 2018      Subjective:   Consult Note: 2018   Requesting Physician: Dr. Zepeda First  Reason for consult: Shock    Medical records and data reviewed. Patient reviewed voted to the emergency room with acute onset of shortness of breath. She had clinical response to noninvasive positive pressure ventilation and diuretics. She subsequently ruled in for acute myocardial infarction and echocardiogram shows reduced ejection fraction with regional wall motion abnormalities. She has had worsening kidney injury and serum creatinine and cardiac catheterization has been deferred. She also recently had cranial imaging with MRI that shows findings suspicious for metastatic disease. She is being worked up for possible intra-abdominal malignancy. She has a history of prior colon carcinoma in the remote past which was treated with chemotherapy. She is immune compromised with a history of rheumatoid arthritis and is on immune suppressive agents. Review of prior medications does not reveal recent use of steroids. She was transferred to the Intensive Care Unit yesterday with hypotension requiring pressors despite fluid resuscitation. Urine cultures are growing Pseudomonas, she is on Zosyn and sensitivities are pending. Cortisol level will be checked and stress dose steroids added. Colloids will be continued as she is now 3rd spacing with crystalloid therapy. Pressors will be weaned down as tolerated. Oncology, cardiology as well as nephrology are following and she is to have an MRI and bone scan today. She is currently on room air.  Chest x-ray on admission had shown bilateral pleural effusions and interstitial edema without focal airspace disease or identifiable mass      Review of Systems:     A comprehensive 12 system review of systems could not been obtained- lethargic    Assessment:   Multisystem organ dysfunction  Shock, perhaps mixed in etiology with underlying sepsis and cardiac dysfunction  Pseudomonas urosepsis  Acute decompensated heart failure with reduced ejection fraction  Acute non-ST segment elevation myocardial infarction with ischemic cardiomyopathy  Acute kidney injury likely evolving ATN from hypotension  Suspected cranial metastases, workup ongoing  Remote history of colon carcinoma  Abnormal LFTs  Other medical problems per chart      Recommendations: Albumin  Wean levo fed as tolerated  Cortisol  Stress dose steroids  On antibiotics, follow sensitivities and adjust if Pseudomonas is not sensitive to Zosyn  Deep vein thrombosis and stress ulcer prophylaxis  MRI and bone scan are pending  Will likely need dedicated chest CT  Limited echo today to reevaluate LV function  Cardiology, oncology and nephrology are following  Anticipate transfer when weaned off pressors  Discussed with RN  Patient is acutely ill and is at risk for further decline given undiagnosed underlying conditions and ongoing hypotension. Critical care time spent 35 minutes      Active Problem List:     Problem List  Date Reviewed: 4/16/2018          Codes Class    * (Principal)SOB (shortness of breath) ICD-10-CM: R06.02  ICD-9-CM: 786.05         CKD (chronic kidney disease) stage 3, GFR 30-59 ml/min ICD-10-CM: N18.3  ICD-9-CM: 483. 3         Vitamin D deficiency ICD-10-CM: E55.9  ICD-9-CM: 268.9         Asymptomatic hyperuricemia ICD-10-CM: E79.0  ICD-9-CM: 790.6         Statin intolerance ICD-10-CM: Z78.9  ICD-9-CM: 995.27         Long-term use of immunosuppressant medication ICD-10-CM: Z79.899  ICD-9-CM: V58.69         Seropositive rheumatoid arthritis of multiple sites [facility]) ICD-10-CM: M05.79  ICD-9-CM: 714.0         Primary osteoarthritis of both knees ICD-10-CM: M17.0  ICD-9-CM: 715.16         Occlusion and stenosis of carotid artery without mention of cerebral infarction ICD-10-CM: I65.29  ICD-9-CM: 433.10         Weakness due to cerebrovascular accident ICD-10-CM: [id number]  ICD-9-CM: [id number]         Neuropathy in diabetes ([place] [address].) ICD-10-CM: E11.40  ICD-9-CM: 250.60, 357.2         PAD (peripheral artery disease) ([place] [address].) ICD-10-CM: I73.9  ICD-9-CM: 443.9         Carotid bruit ICD-10-CM: R09.89  ICD-9-CM: 220. 9         Claudication ([place] [address].) ICD-10-CM: I73.9  ICD-9-CM: 443.9         Weakness of left arm ICD-10-CM: R29.898  ICD-9-CM: 729.89         Hyperlipidemia ICD-10-CM: E78.5  ICD-9-CM: 272.4         DM (diabetes mellitus) (HCC) ICD-10-CM: E11.9  ICD-9-CM: 250.00         Hypothyroid ICD-10-CM: E03.9  ICD-9-CM: 244.9         Colon cancer (HCC) ICD-10-CM: C18.9  ICD-9-CM: 153.9         H/O: CVA ICD-9-CM: V12.54         Microalbuminuria ICD-10-CM: R80.9  ICD-9-CM: 791.0         Age-related osteoporosis without current pathological fracture ICD-10-CM: M81.0  ICD-9-CM: 733.01         Essential hypertension ICD-10-CM: I10  ICD-9-CM: 401.9         Anemia ICD-10-CM: D64.9  ICD-9-CM: 285.9               Past Medical History:      has a past medical history of Anemia (9/2/2009); Colon cancer ([place] [address].) (9/2/2009); Colon cancer ([place] [address] (9/2/2009); DM (diabetes mellitus) ([place] [address] (9/2/2009); GERD (gastroesophageal reflux disease); H/O: CVA (9/2/2009); Hypothyroid (9/2/2009); Ill-defined condition; Microalbuminuria (9/2/2009); Osteoporosis (9/2/2009); Other and unspecified hyperlipidemia (1/27/2010); Rheumatoid arthritis involving ankle ([facility] [address] (9/28/2016); Rheumatoid arthritis(714.0) (9/2/2009); Unspecified essential hypertension (9/2/2009); and Weakness due to cerebrovascular accident. She also has no past medical history of Abuse; Adult physical abuse; Arrhythmia; Calculus of kidney; Chronic pain; Congestive heart failure, unspecified; Contact dermatitis and other eczema, due to unspecified cause; COPD; Depression; Headache(784.0); Psychotic disorder;  Unspecified adverse effect of anesthesia; or Unspecified sleep apnea. Past Surgical History:      has a past surgical history that includes hx colectomy; hx hysterectomy; and hx other surgical.    Home Medications:     Prior to Admission medications    Medication Sig Start Date End Date Taking? Authorizing Provider   glimepiride (AMARYL) 1 mg tablet Take 1 mg by mouth every morning. Yes Valentino Perkins MD   levothyroxine (SYNTHROID) 88 mcg tablet Take 88 mcg by mouth Daily (before breakfast). Yes Historical Provider   etanercept (ENBREL) 50 mg/mL (0.98 mL) injection 1 mL by SubCUTAneous route every seven (7) days. 3/16/18  Yes Javi Johansen MD   FORTEO 20 mcg/dose - 600 mcg/2.4 mL pnij injection USE 1 INJECTION (20 MCG OR 0.08ML) UNDER THE SKIN ONCE DAILY. 3/12/18  Yes Javi Johansen MD   clopidogrel (PLAVIX) 75 mg tab Take 1 Tab by mouth daily. 3/5/18  Yes Sandoval Crawford MD   evolocumab MAIN LINE [facility]) pen injection 1 mL by SubCUTAneous route every fourteen (14) days. 1/11/18  Yes Annie Pak MD   folic acid (FOLVITE) 1 mg tablet TAKE ONE TABLET BY MOUTH DAILY 10/9/17  Yes Javi Johansen MD   carvedilol (COREG) 25 mg tablet Take 25 mg by mouth two (2) times a day. 11/11/16  Yes Historical Provider   potassium chloride SR (KLOR-CON 10) 10 mEq tablet Take 20 mEq by mouth daily. 12/18/16  Yes Historical Provider   chlorthalidone (HYGROTEN) 50 mg tablet Take 50 mg by mouth daily. Yes Historical Provider   sitaGLIPtin (JANUVIA) 25 mg tablet Take 50 mg by mouth daily. Yes Historical Provider   VIT C/VIT E/LUTEIN/MIN/OMEGA-3 (OCUVITE PO) Take 1 Tab by mouth daily. Yes Historical Provider   MAGNESIUM MALATE Take 400 mg by mouth daily. Yes Historical Provider   MULTIVITAMIN WITH MINERALS (HAIR,SKIN & NAILS PO) Take 1 Tab by mouth daily. Yes Historical Provider   aspirin delayed-release 81 mg tablet Take 81 mg by mouth daily. Yes Historical Provider   metFORMIN (GLUCOPHAGE) 1,000 mg tablet Take 1 Tab by mouth two (2) times daily (with meals).  4/11/12  Yes Rayray Martines MD   OMEGA-3 FATTY ACIDS/FISH OIL (OMEGA 3 FISH OIL PO) Take 300 mg by mouth daily. Yes Historical Provider   ascorbate calcium (MAKAYLA-C) 500 mg Tab Take 1,000 mg by mouth daily. 4/15/11  Yes Historical Provider   CHOLECALCIFEROL, VITAMIN D3, (VITAMIN D-3 PO) Take 1,000 Units by mouth daily. Yes Historical Provider       Allergies/Social/Family History: Allergies   Allergen Reactions    Statins-Hmg-Coa Reductase Inhibitors Other (comments)     Intolerant to statins     Sulfa (Sulfonamide Antibiotics) Other (comments)     syncope      Social History   Substance Use Topics    Smoking status: Never Smoker    Smokeless tobacco: Never Used    Alcohol use No      Family History   Problem Relation Age of Onset    Diabetes Mother     Stroke Mother     Diabetes Sister     Other Sister      fell and hit her head -  of this   Tivis Abelson Diabetes Brother     Cancer Father      stomach    Diabetes Sister             Objective:   Vital Signs:  Visit Vitals    /45    Pulse 88    Temp 98.1 °F (36.7 °C)    Resp 21    Ht 5' 1\" (1.549 m)    Wt 64 kg (141 lb)    SpO2 93%    BMI 26.64 kg/m2    O2 Flow Rate (L/min): 2 l/min O2 Device: Room air Temp (24hrs), Av.1 °F (37.3 °C), Min:97.9 °F (36.6 °C), Max:103.2 °F (39.6 °C)           Intake/Output:     Intake/Output Summary (Last 24 hours) at 18 1521  Last data filed at 18 1200   Gross per 24 hour   Intake           3333.1 ml   Output              640 ml   Net           2693.1 ml       Physical Exam:   General:  Lethargic, no distress, appears stated age. Head:  Normocephalic, without obvious abnormality, atraumatic. Eyes:  Conjunctivae/corneas clear. PERRL   Neck: Supple, symmetrical, no adenopathy, no carotid bruit and no JVD. Lungs:   Clear to auscultation bilaterally. Chest wall:  No tenderness or deformity. Heart:  Regular rate and rhythm, S1-S2 normal, no murmur, no click, rub or gallop.    Abdomen:   Soft, non-tender. Bowel sounds present. No masses,  No organomegaly. Extremities: Atraumatic, no cyanosis or edema. Pulses: Palpable   Skin: No rashes or lesions   Neurologic: Grossly nonfocal         LABS AND  DATA: Personally reviewed  Recent Labs      04/20/18 0430 04/19/18 2036   WBC  13.7*  13.3*   HGB  7.5*  7.6*   HCT  23.6*  23.8*   PLT  233  235     Recent Labs      04/20/18 0430 04/19/18 2036 04/18/18 0814   NA  133*  134*   < >  133*   K  3.5  3.3*   < >  4.0   CL  99  99   < >  95*   CO2  24  25   < >  27   BUN  36*  32*   < >  30*   CREA  1.78*  1.53*   < >  1.63*   GLU  110*  123*   < >  268*   CA  8.7  8.2*   < >  9.3   PHOS   --    --    --   3.5    < > = values in this interval not displayed. Recent Labs      04/19/18 2036 04/18/18 0814  04/18/18 0449   SGOT  56*   --   181*   AP  533*   --   935*   TP  6.7   --   8.6*   ALB  2.2*  2.6*  2.7*   GLOB  4.5*   --   5.9*     No results for input(s): INR, PTP, APTT in the last 72 hours. No lab exists for component: INREXT   No results for input(s): PHI, PCO2I, PO2I, FIO2I in the last 72 hours. Recent Labs      04/18/18 0449   TROIQ  12.70*       MEDS: Reviewed    Chest Imaging: personally reviewed and report checked    Tele- reviewed    Medical decision making:   I have reviewed the flowsheet and previous day's notes  Acute or chronic illness that poses a threat to life or bodily function  Review and order of Clinical lab tests  Review and Order of Radiology tests  Independent visualization of Image  Reviewed Ventilator / NiPPV  Reviewed high risk medications/sedatives      Thank you for allowing me to participate in this patient's care.     MD Krzysztof Rubi MD, [facility]  [facility]

## 2018-04-20 NOTE — PROGRESS NOTES
1600- patient has fever of 103, MD notified, PRN tylenol given. Per MD will add Zosyn. Will recheck temp and continue to monitor. 1700- Patients temp 98.9. No chills, all other VSS stable. Will continue to monitor. 1840-Patients BP 94/33, MAP 53 paged MD, held scheduled coreg. Patient asymptomatic all other VSS stable. 1843- Per Dr Marcin Le 500 CC fluid bolus and dose of albumin    1940- Patients BP continues to be low, new systolic in high 06P  after 500cc fluid bolus completed, paged Dr Philip Delacruz. 80- Order received for additonal fluid bolus, bolus given. 2000- Patients BP in high 84T systolic, RRT initiated, MD pageoctavio, Patient asymptomatic, CCU RN at bedside. Received call from  Dr Rayray Jay ordered sepsis protocol. Labs were drawn and sent of per protocol, patient continues to have albumin infusion. Dr Rayray Jay at bedside to evaluate pt. Patient to be transferred to ICU for hypotension mgmt.

## 2018-04-20 NOTE — PROGRESS NOTES
Patient transferred to ICU for sepsis and hypotension. Care management is continuing to follow. Latrell Segura RN,CRM

## 2018-04-20 NOTE — PROGRESS NOTES
[facility]   [address] [address], [address], [facility]  Phone: [phone]   [phone]       Nephrology Progress Note  Wanda Bradley     1935     [id number]  Date of Admission : 4/16/2018 04/20/18    CC: Follow up for JEROD        Assessment and Plan   JEROD on CKD   - Initially from Cardiac event/ NSTEMI   - Now 2/2 Sepsis, Hypotension   - she is 3 rd spacing : reduced NS to 50 cc/ hr   - Increased Colloidals   - High risk for JEROD w/ Cardiac cath at this time      Hyponatremia :  - 2/2 CHF     Sepsis w/ Shock :  - per PCCM     UTI : On abx     CKD Stage III :  - baseline Cr 0.9-1 mg/dl lately      NSTEMI :  - Echo shows EF 35-45%, mod LVH, Severe Hypokinesis of apex and moderate Hypokinesis of anteroseptal wall  - cath plans per Dr Flavio De La Cruz      Acute Systolic CHF: 2/2 MI   - avoid  ACE/ARB     Chronic Pontine Infarcts      ? Brain Mets on MRI and Liver met    hx of colon Ca   Anemia :  - being w/u Onc     Interval History:  Overnight events noted   Now on pressor supprt   Cr which was improving last night is worse after profound hypotension overnight   Denies any CP /N/V.sob +    Review of Systems: Pertinent items are noted in HPI.     Current Medications:   Current Facility-Administered Medications   Medication Dose Route Frequency    famotidine (PF) (PEPCID) 20 mg in sodium chloride 0.9% 10 mL injection  20 mg IntraVENous DAILY    hydrocortisone Sod Succ (PF) (SOLU-CORTEF) injection 100 mg  100 mg IntraVENous Q8H    albumin human 25% (BUMINATE) solution 25 g  25 g IntraVENous Q6H    0.9% sodium chloride infusion  50 mL/hr IntraVENous CONTINUOUS    piperacillin-tazobactam (ZOSYN) 3.375 g in 0.9% sodium chloride (MBP/ADV) 100 mL  3.375 g IntraVENous Q8H    NOREPINephrine (LEVOPHED) 8 mg in 5% dextrose 250mL infusion  2-30 mcg/min IntraVENous TITRATE    PHENYLephrine (PF)(RICARDO-SYNEPHRINE) 30 mg in 0.9% sodium chloride 250 mL infusion   mcg/min IntraVENous TITRATE    Vancomycin - pharmacy to dose   Other Rx Dosing/Monitoring    vancomycin (VANCOCIN) 750 mg in 0.9% sodium chloride (MBP/ADV) 250 mL  750 mg IntraVENous Q24H    glucose chewable tablet 16 g  4 Tab Oral PRN    dextrose (D50W) injection syrg 12.5-25 g  12.5-25 g IntraVENous PRN    glucagon (GLUCAGEN) injection 1 mg  1 mg IntraMUSCular PRN    insulin lispro (HUMALOG) injection   SubCUTAneous AC&HS    prochlorperazine (COMPAZINE) with saline injection 5 mg  5 mg IntraVENous Q8H PRN    ascorbic acid (vitamin C) (VITAMIN C) tablet 500 mg  500 mg Oral DAILY    aspirin delayed-release tablet 81 mg  81 mg Oral DAILY    cholecalciferol (VITAMIN D3) tablet 1,000 Units  1,000 Units Oral DAILY    clopidogrel (PLAVIX) tablet 75 mg  75 mg Oral DAILY    folic acid (FOLVITE) tablet 1 mg  1 mg Oral DAILY    glimepiride (AMARYL) tablet 1 mg  1 mg Oral 7am    magnesium oxide (MAG-OX) tablet 400 mg  400 mg Oral DAILY    therapeutic multivitamin (THERAGRAN) tablet 1 Tab  1 Tab Oral DAILY    fish oil-omega-3 fatty acids 340-1,000 mg capsule 1 Cap  1 Cap Oral DAILY    SITagliptin (JANUVIA) tablet tab 50 mg  50 mg Oral DAILY    sodium chloride (NS) flush 5-10 mL  5-10 mL IntraVENous Q8H    sodium chloride (NS) flush 5-10 mL  5-10 mL IntraVENous PRN    acetaminophen (TYLENOL) tablet 650 mg  650 mg Oral Q4H PRN    ondansetron (ZOFRAN) injection 4 mg  4 mg IntraVENous Q4H PRN    enoxaparin (LOVENOX) injection 30 mg  30 mg SubCUTAneous Q24H    levothyroxine (SYNTHROID) tablet 88 mcg  88 mcg Oral ACB      Allergies   Allergen Reactions    Statins-Hmg-Coa Reductase Inhibitors Other (comments)     Intolerant to statins     Sulfa (Sulfonamide Antibiotics) Other (comments)     syncope       Objective:  Vitals:    Vitals:    04/20/18 0700 04/20/18 0800 04/20/18 0900 04/20/18 1000   BP: 99/43 96/44 117/51 113/58   Pulse: 80 78 87 82   Resp: 16 17 19 23   Temp:  98.1 °F (36.7 °C)     SpO2: 95% 93% 97% 97%   Weight:       Height: Intake and Output:  04/20 0701 - 04/20 1900  In: 381.4 [I.V.:381.4]  Out: 50 [Urine:50]  04/18 1901 - 04/20 0700  In: 2908.3 [P.O.:240; I.V.:2668.3]  Out: 1540 [Urine:1540]    Physical Examination:    General: No distress  Neck:  Supple, no mass  Resp:  Diminished at bases   CV:  RRR, no murmur  GI:  Soft, NT, + BS  Neurologic:  Non focal     []    High complexity decision making was performed  []    Patient is at high-risk of decompensation with multiple organ involvement    Lab Data Personally Reviewed: I have reviewed all the pertinent labs, microbiology data and radiology studies during assessment.     Recent Labs      04/20/18 0430 04/19/18 2036 04/19/18 0328 04/18/18 0814  04/18/18   0449   NA  133*  134*  134*  133*  132*   K  3.5  3.3*  3.4*  4.0  4.0   CL  99  99  100  95*  95*   CO2  24  25  27  27  27   GLU  110*  123*  172*  268*  279*   BUN  36*  32*  35*  30*  31*   CREA  1.78*  1.53*  1.78*  1.63*  1.55*   CA  8.7  8.2*  8.6  9.3  9.7   PHOS   --    --    --   3.5   --    ALB   --   2.2*   --   2.6*  2.7*   SGOT   --   56*   --    --   181*   ALT   --   29   --    --   63     Recent Labs      04/20/18 0430 04/19/18 2036 04/19/18 0328 04/18/18 0814   WBC  13.7*  13.3*  12.3*  10.4   HGB  7.5*  7.6*  8.9*  9.6*   HCT  23.6*  23.8*  27.8*  28.8*   PLT  233  235  289  392     No results found for: SDES  Lab Results   Component Value Date/Time    Culture result: NO GROWTH AFTER 8 HOURS 04/19/2018 08:36 PM    Culture result: NO GROWTH AFTER 8 HOURS 04/19/2018 08:36 PM    Culture result: GRAM NEGATIVE RODS (A) 04/19/2018 12:05 PM    Culture result: PSEUDOMONAS SPECIES (1,000 ORGANISMS PER ML) (A) 04/19/2018 12:05 PM    Culture result: NO GROWTH 2 DAYS 04/18/2018 08:15 AM     Recent Results (from the past 24 hour(s))   GLUCOSE, POC    Collection Time: 04/19/18 11:59 AM   Result Value Ref Range    Glucose (POC) 141 (H) 65 - 100 mg/dL    Performed by Ned Rodas, URINE Collection Time: 04/19/18 12:05 PM   Result Value Ref Range    Special Requests:       IF SAMPLE AVAILABLE FROM LAST EVENING PLEASE ADD TO THE SAMPLE FROM 4/18    [place] Count >100,000  COLONIES/mL        Culture result: GRAM NEGATIVE RODS (A)      Culture result: PSEUDOMONAS SPECIES (1,000 ORGANISMS PER ML) (A)     GLUCOSE, POC    Collection Time: 04/19/18  4:41 PM   Result Value Ref Range    Glucose (POC) 123 (H) 65 - 100 mg/dL    Performed by Arlet Arriaga    IRON PROFILE    Collection Time: 04/19/18  6:09 PM   Result Value Ref Range    Iron 12 (L) 35 - 150 ug/dL    TIBC 232 (L) 250 - 450 ug/dL    Iron % saturation 5 (L) 20 - 50 %   FERRITIN    Collection Time: 04/19/18  6:09 PM   Result Value Ref Range    Ferritin 424 (H) 8 - 252 NG/ML   RETICULOCYTE COUNT    Collection Time: 04/19/18  6:09 PM   Result Value Ref Range    Reticulocyte count 2.8 (H) 0.7 - 2.1 %    Absolute Retic Cnt. 0.0787 (H) 0.0164 - 0.0776 M/ul   LD    Collection Time: 04/19/18  6:09 PM   Result Value Ref Range     (H) 81 - 246 U/L   DIRECT & INDIRECT QUINTEN    Collection Time: 04/19/18  6:09 PM   Result Value Ref Range    UZMA Poly NEG     Antibody screen NEG    HAPTOGLOBIN    Collection Time: 04/19/18  6:09 PM   Result Value Ref Range    Haptoglobin 228 (H) 30 - 200 mg/dL   LACTIC ACID    Collection Time: 04/19/18  8:36 PM   Result Value Ref Range    Lactic acid 0.9 0.4 - 2.0 MMOL/L   CULTURE, BLOOD    Collection Time: 04/19/18  8:36 PM   Result Value Ref Range    Special Requests: NO SPECIAL REQUESTS      Culture result: NO GROWTH AFTER 8 HOURS     CULTURE, BLOOD    Collection Time: 04/19/18  8:36 PM   Result Value Ref Range    Special Requests: NO SPECIAL REQUESTS      Culture result: NO GROWTH AFTER 8 HOURS     METABOLIC PANEL, COMPREHENSIVE    Collection Time: 04/19/18  8:36 PM   Result Value Ref Range    Sodium 134 (L) 136 - 145 mmol/L    Potassium 3.3 (L) 3.5 - 5.1 mmol/L    Chloride 99 97 - 108 mmol/L    CO2 25 21 - 32 mmol/L    Anion gap 10 5 - 15 mmol/L    Glucose 123 (H) 65 - 100 mg/dL    BUN 32 (H) 6 - 20 MG/DL    Creatinine 1.53 (H) 0.55 - 1.02 MG/DL    BUN/Creatinine ratio 21 (H) 12 - 20      GFR est AA 39 (L) >60 ml/min/1.73m2    GFR est non-AA 33 (L) >60 ml/min/1.73m2    Calcium 8.2 (L) 8.5 - 10.1 MG/DL    Bilirubin, total 0.6 0.2 - 1.0 MG/DL    ALT (SGPT) 29 12 - 78 U/L    AST (SGOT) 56 (H) 15 - 37 U/L    Alk. phosphatase 533 (H) 45 - 117 U/L    Protein, total 6.7 6.4 - 8.2 g/dL    Albumin 2.2 (L) 3.5 - 5.0 g/dL    Globulin 4.5 (H) 2.0 - 4.0 g/dL    A-G Ratio 0.5 (L) 1.1 - 2.2     CBC WITH AUTOMATED DIFF    Collection Time: 04/19/18  8:36 PM   Result Value Ref Range    WBC 13.3 (H) 3.6 - 11.0 K/uL    RBC 2.65 (L) 3.80 - 5.20 M/uL    HGB 7.6 (L) 11.5 - 16.0 g/dL    HCT 23.8 (L) 35.0 - 47.0 %    MCV 89.8 80.0 - 99.0 FL    MCH 28.7 26.0 - 34.0 PG    MCHC 31.9 30.0 - 36.5 g/dL    RDW 22.6 (H) 11.5 - 14.5 %    PLATELET 285 981 - 173 K/uL    MPV 10.0 8.9 - 12.9 FL    NRBC 0.0 0  WBC    ABSOLUTE NRBC 0.00 0.00 - 0.01 K/uL    NEUTROPHILS 75 32 - 75 %    LYMPHOCYTES 8 (L) 12 - 49 %    MONOCYTES 16 (H) 5 - 13 %    EOSINOPHILS 1 0 - 7 %    BASOPHILS 0 0 - 1 %    IMMATURE GRANULOCYTES 0 %    ABS. NEUTROPHILS 10.0 (H) 1.8 - 8.0 K/UL    ABS. LYMPHOCYTES 1.1 0.8 - 3.5 K/UL    ABS. MONOCYTES 2.1 (H) 0.0 - 1.0 K/UL    ABS. EOSINOPHILS 0.1 0.0 - 0.4 K/UL    ABS. BASOPHILS 0.0 0.0 - 0.1 K/UL    ABS. IMM.  GRANS. 0.0 K/UL    DF MANUAL      RBC COMMENTS ANISOCYTOSIS  2+        RBC COMMENTS HYPOCHROMIA  1+       GLUCOSE, POC    Collection Time: 04/19/18  9:13 PM   Result Value Ref Range    Glucose (POC) 143 (H) 65 - 100 mg/dL    Performed by [address], POC    Collection Time: 04/19/18 10:45 PM   Result Value Ref Range    Glucose (POC) 137 (H) 65 - 100 mg/dL    Performed by [address], BASIC    Collection Time: 04/20/18  4:30 AM   Result Value Ref Range    Sodium 133 (L) 136 - 145 mmol/L Potassium 3.5 3.5 - 5.1 mmol/L    Chloride 99 97 - 108 mmol/L    CO2 24 21 - 32 mmol/L    Anion gap 10 5 - 15 mmol/L    Glucose 110 (H) 65 - 100 mg/dL    BUN 36 (H) 6 - 20 MG/DL    Creatinine 1.78 (H) 0.55 - 1.02 MG/DL    BUN/Creatinine ratio 20 12 - 20      GFR est AA 33 (L) >60 ml/min/1.73m2    GFR est non-AA 27 (L) >60 ml/min/1.73m2    Calcium 8.7 8.5 - 10.1 MG/DL   CBC W/O DIFF    Collection Time: 04/20/18  4:30 AM   Result Value Ref Range    WBC 13.7 (H) 3.6 - 11.0 K/uL    RBC 2.62 (L) 3.80 - 5.20 M/uL    HGB 7.5 (L) 11.5 - 16.0 g/dL    HCT 23.6 (L) 35.0 - 47.0 %    MCV 90.1 80.0 - 99.0 FL    MCH 28.6 26.0 - 34.0 PG    MCHC 31.8 30.0 - 36.5 g/dL    RDW 22.8 (H) 11.5 - 14.5 %    PLATELET 499 934 - 842 K/uL    MPV 10.1 8.9 - 12.9 FL    NRBC 0.0 0  WBC    ABSOLUTE NRBC 0.00 0.00 - 0.01 K/uL   URINALYSIS W/ REFLEX CULTURE    Collection Time: 04/20/18  4:30 AM   Result Value Ref Range    Color YELLOW/STRAW      Appearance TURBID (A) CLEAR      Specific gravity 1.029 1.003 - 1.030      pH (UA) 5.0 5.0 - 8.0      Protein 100 (A) NEG mg/dL    Glucose NEGATIVE  NEG mg/dL    Ketone NEGATIVE  NEG mg/dL    Bilirubin NEGATIVE  NEG      Blood LARGE (A) NEG      Urobilinogen 0.2 0.2 - 1.0 EU/dL    Nitrites NEGATIVE  NEG      Leukocyte Esterase LARGE (A) NEG      WBC >100 (H) 0 - 4 /hpf    RBC 5-10 0 - 5 /hpf    Epithelial cells FEW FEW /lpf    Bacteria 3+ (A) NEG /hpf    UA:UC IF INDICATED URINE CULTURE ORDERED (A) CNI     GLUCOSE, POC    Collection Time: 04/20/18  6:26 AM   Result Value Ref Range    Glucose (POC) 125 (H) 65 - 100 mg/dL    Performed by [address]    Collection Time: 04/20/18  9:30 AM   Result Value Ref Range    Cortisol, random 31.1 ug/dL       I have reviewed the flowsheets. Chart and Pertinent Notes have been reviewed. No change in PMH ,family and social history from Consult note.       Ainsley Sicard, MD

## 2018-04-20 NOTE — PROGRESS NOTES
Her nurse called me last night about SBP 86 mmHg on levophed so I ordered neosynephrine addition  Dr Leonid France was seeing her

## 2018-04-20 NOTE — PROGRESS NOTES
0800: Report received from Maira Dalton RN using SBAR format. Care assumed. Pt resting in bed. Denies any c/o pain at this time. See doc flow sheets for assessment. 0830: Son and caregiver at bedside. All questions an concerns addressed. 1100: Interdisciplinary team rounds were held 4/20/2018 with the following team members:Care Management, Nursing, Nutrition, Pharmacy and Physician and the patient. Plan of care discussed. See clinical pathway and/or care plan for interventions and desired outcomes. 1145: Pt of floor to MRI then NM with RN and transport. 1310: Arrive to [address] [place] [address] Road: Back to floor. Tolerated travel well.     1930: Report given to Colten Briceño RN using SBAR format.

## 2018-04-20 NOTE — PROGRESS NOTES
Pt seen and evaluated for rapid response code. Nurse reports patient is still hypotensive despite receiving 0.9%  ml IVF boluses x 2 in addition to current infusing dose of Albumin. Patient has previously been hypotensive and a rapid response code had been called last night with similar episode. However tonight patient remains hypotensive. Pt has CHF with EF of only 35%-45%, NSTEMI with prior troponin= 18.9 decreased to 12.70 with left heart cath on hold d/t concerns for possible malignancy with calvarial findings on MRI brain. Patient was febrile with temperature= 103.2 F earlier this evening. VS:  T   BP=  87/43  HR=   RR= 20   O2sat= 100% on 2 liters O2 NC  PE:  GEN-NAD  PSYCH-A&Ox3  NEURO-GCS 15. Moves all extremities x 4 with generalized weakness. No slurred speech. No facial droop. Sensation grossly intact. HEENT-NCAT/pupils 2 mm reactive bilateral. Oropharynx clear. NECK-supple, trachea midline  LUNGS- bibasilar fine rales  HEART-RRR  ABD-soft, NT,ND, + BS. No R/G/R  VASCULAR-2+ radial/1+DP pulses bilateral. No C/C/E  SKIN-warm/dry  MS-no calf tenderness    A/P:  Shock/ hypotension with sepsis-refractory to IVF boluses. Concern for sepsis. May need vasopressor support. Needs continuous monitoring and will transfer to ICU for higher level of care. I informed patient her family of concerns and possible need for insertion of central venous catheter if vasopressors are started. Will however have to be cautious with administration of IVFs d/t CHF and even vasopressors with noted NSTEMI.

## 2018-04-20 NOTE — PROGRESS NOTES
Bedside shift change report given to April RN (oncoming nurse) [facility]off[facility] Report included the following information SBAR, Kardex, ED Summary, Procedure Summary, Intake/Output, MAR and Recent Results.

## 2018-04-20 NOTE — PROGRESS NOTES
Problem: General Medical Care Plan  Goal: *Absence of infection signs and symptoms  Outcome: Progressing Towards Goal  Pt being treated with zosyn and rocephin, Pending blood cultures and urine culture  Goal: *Fluid volume balance  Outcome: Progressing Towards Goal  IV hydration with normal saline @75ml/hr

## 2018-04-20 NOTE — PROGRESS NOTES
Pharmacist Note - Vancomycin Dosing    Consult provided for this 82 y.o. female for indication of sepsis. Antibiotic regimen(s): Vanc + Zosyn    Recent Labs      18   2036  18   0328  18   0814   WBC  13.3*  12.3*  10.4   CREA  1.53*  1.78*  1.63*   BUN  32*  35*  30*     Frequency of BMP: tomorrow AM  Height: 154.9 cm  Weight: 57.9 kg  Est CrCl: 24 ml/min; UO: 1.4 ml/kg/hr  Temp (24hrs), Av.5 °F (37.5 °C), Min:98.1 °F (36.7 °C), Max:103.2 °F (39.6 °C)    Cultures:   blood - NGTD   blood x2 - pending   urine - pending    Goal trough = 15 - 20 mcg/mL    Therapy will be initiated with a loading dose of 1250 mg IV x 1 to be followed by a maintenance dose of 750 mg IV every 24 hours. Pharmacy to follow patient daily and order levels / make dose adjustments as appropriate.

## 2018-04-20 NOTE — PROGRESS NOTES
Day #2 of Vancomycin  Indication:  Severe sepsis/septic shock secondary to possible urinary source vs unclear etiology  Current regimen:  750 mg IV Q 24 hr  Abx regimen:  Zosyn + Vancomycin  ID Following ?: NO  Concomitant nephrotoxic drugs (requires more frequent monitoring): Vasopressors  Frequency of BMP?: x 1 tomorrow    Recent Labs      18   0430  18   2036  18   0328   WBC  13.7*  13.3*  12.3*   CREA  1.78*  1.53*  1.78*   BUN  36*  32*  35*     Est CrCl: ~20-25 ml/min; UO: ~0.9 ml/kg/hr  Temp (24hrs), Av.2 °F (37.3 °C), Min:97.9 °F (36.6 °C), Max:103.2 °F (39.6 °C)    Cultures:    Blood: NGTD   Urine: >100k GNRs + 1k Pseudomonas spp, pending   Blood: NGTD   Urine: pending    Goal trough = 15 - 20 mcg/mL    Recent trough history (date/time/level/dose/action taken):  None    Plan: Given the patient's worsening Scr, will preemptively adjust vancomycin to 750 mg IV Q 36 hr per [facility] P&T Committee Protocol with respect to renal function. Pharmacy will continue to monitor patient daily and will make dosage adjustments based upon changing renal function.

## 2018-04-20 NOTE — PROGRESS NOTES
Problem: Falls - Risk of  Goal: *Absence of Falls  Document Yaron Fall Risk and appropriate interventions in the flowsheet.    Outcome: Progressing Towards Goal  Fall Risk Interventions:  Mobility Interventions: Assess mobility with egress test, Communicate number of staff needed for ambulation/transfer, Patient to call before getting OOB, Strengthening exercises (ROM-active/passive)         Medication Interventions: Evaluate medications/consider consulting pharmacy, Patient to call before getting OOB    Elimination Interventions: Call light in reach, Patient to call for help with toileting needs, Toileting schedule/hourly rounds    History of Falls Interventions: Door open when patient unattended, Evaluate medications/consider consulting pharmacy, Room close to nurse's station

## 2018-04-20 NOTE — PROGRESS NOTES
Problem: Self Care Deficits Care Plan (Adult)  Goal: *Acute Goals and Plan of Care (Insert Text)  Occupational Therapy Goals  Initiated 4/20/2018  1. Patient will perform grooming standing with rollator support with minimal assistance/contact guard assist within 7 day(s). 2.  Patient will perform bathing seated with moderate assistance  within 7 day(s). 3.  Patient will perform upper body dressing and lower body dressing with minimal assistance/contact guard assist within 7 day(s). 4.  Patient will perform toilet transfers with minimal assistance/contact guard assist within 7 day(s). 5.  Patient will perform all aspects of toileting with minimal assistance/contact guard assist within 7 day(s). 6.  Patient will participate in upper extremity therapeutic exercise/activities with supervision/set-up for 10 minutes within 7 day(s). 7.  Patient will utilize energy conservation techniques during functional activities with verbal cues within 7 day(s). Occupational Therapy EVALUATION  Patient: Jamal Lange [de-identified]82 y.o. female)  Date: 4/20/2018  Primary Diagnosis: SOB (shortness of breath)        Precautions:   Fall    ASSESSMENT :  Based on the objective data described below, the patient presents with generally decreased functional strength with L-sided weakness at baseline from h/o CVA, impaired cardiopulmonary tolerance (no REDDY and on RA; NSTEMI with EF 35-45%), impaired sitting and standing balance, impaired standing tolerance, limiting her independence and safety with ADL routine. Pt with rapid response 4/19/18 with transfer to ICU d/t hypotension; BP now stable within limits. ADLs overall min-maxA, min-modAx2 for sit<> prep for OOB ADLs. Per chart, pt lives alone; however, per son at bedside, pt has aides that come daily to assists with ADL/IADLs (5 hours in am and 3 hours in pm M-F; 6 hours on weekends).  Anticipate pt to progress well with acute therapy; discharge recommendation rehab/skilled nursing versus home health OT/PT with increased assistance. Patient will benefit from skilled intervention to address the above impairments. Patients rehabilitation potential is considered to be Good  Factors which may influence rehabilitation potential include:   []             None noted  []             Mental ability/status  [x]             Medical condition  []             Home/family situation and support systems  []             Safety awareness  []             Pain tolerance/management  []             Other:      PLAN :  Recommendations and Planned Interventions:  [x]               Self Care Training                  [x]        Therapeutic Activities  [x]               Functional Mobility Training    []        Cognitive Retraining  [x]               Therapeutic Exercises           [x]        Endurance Activities  [x]               Balance Training                   []        Neuromuscular Re-Education  []               Visual/Perceptual Training     [x]   Home Safety Training  [x]               Patient Education                 [x]        Family Training/Education  []               Other (comment):    Frequency/Duration: Patient will be followed by occupational therapy 5 times a week to address goals. Discharge Recommendations: Home Health with increased supervision/assistance from home care aides versus short stay [facility]  Further Equipment Recommendations for Discharge: none at this time     SUBJECTIVE:   Patient stated I feel okay.     OBJECTIVE DATA SUMMARY:   HISTORY:   Past Medical History:   Diagnosis Date    Anemia 9/2/2009    Colon cancer ([facility] [address] 9/2/2009    surgery/chemo    Colon cancer ([facility] [address] 9/2/2009    [facility]    GERD (gastroesophageal reflux disease)     H/O: CVA 9/2/2009    slight l sided weakness    Hypothyroid 9/2/2009    Ill-defined condition     seasonal allergies    Microalbuminuria 9/2/2009    Osteoporosis 9/2/2009    Other and unspecified hyperlipidemia 1/27/2010    Rheumatoid arthritis involving ankle ([facility] [address] 9/28/2016    Rheumatoid arthritis(714.0) 9/2/2009    Unspecified essential hypertension 9/2/2009    Weakness due to cerebrovascular accident      Past Surgical History:   Procedure Laterality Date    HX COLECTOMY      HX HYSTERECTOMY      HX OTHER SURGICAL      colonoscopies numerous since 1993       Prior Level of Function/Environment/Context: Pt lives alone, has home aides (5 hours in am and 3 hours in pm M-F; 6 hours on weekends) who assists with bathing, PRN dressing and IADLs. Pt uses rollator. Baseline left sided weakness from CVA. Occupations in which the patient is/was successful, what are the barriers preventing that success:   Performance Patterns (routines, roles, habits, and rituals):   Personal Interests and/or values:   Expanded or extensive additional review of patient history:     Home Situation  Home Environment: Private residence  # Steps to Enter: 4  One/Two Story Residence: Two story, live on 1st floor  Living Alone: Yes  Support Systems: Home care staff, Child(madi), Family member(s)  Patient Expects to be Discharged to[de-identified] Private residence  Current DME Used/Available at Home: Barbara Allyssa, rollator, Safety frame toliet, Shower chair  Tub or Shower Type: Tub/Shower combination  [x]  Right hand dominant   []  Left hand dominant    EXAMINATION OF PERFORMANCE DEFICITS:  Cognitive/Behavioral Status:  Neurologic State: Alert  Orientation Level: Oriented X4  Cognition: Follows commands; Appropriate safety awareness; Appropriate for age attention/concentration; Appropriate decision making  Perception: Appears intact  Perseveration: No perseveration noted  Safety/Judgement: Awareness of environment; Fall prevention;Good awareness of safety precautions; Home safety; Insight into deficits    Skin: appears intact    Edema: none noted in BUEs    Hearing:   Auditory  Auditory Impairment: None    Vision/Perceptual:    Tracking: Able to track stimulus in all quadrants w/o difficulty                 Diplopia: No              Range of Motion:    AROM: Generally decreased, functional (LUE decreased at baseline d/t CVA)  PROM: Within functional limits                      Strength:    Strength: Generally decreased, functional (LUE decreased at baseline d/t CVA)                Coordination:  Coordination: Generally decreased, functional  Fine Motor Skills-Upper: Left Impaired;Right Intact    Gross Motor Skills-Upper: Left Impaired;Right Intact    Tone & Sensation:    Tone: Normal  Sensation: Intact (no c/o numbness/tingling)                      Balance:  Sitting: Impaired; Without support  Sitting - Static: Good (unsupported)  Sitting - Dynamic: Fair (occasional)  Standing: Impaired; With support  Standing - Static: Fair  Standing - Dynamic : Fair    Functional Mobility and Transfers for ADLs:  Bed Mobility:  Supine to Sit:  (NT pt placed in bed/chair position)    Transfers:  Sit to Stand: Minimum assistance; Moderate assistance;Assist x2  Stand to Sit: Minimum assistance;Assist x2  Toilet Transfer : Moderate assistance (inferred from transfer)    ADL Assessment:  Feeding: Minimum assistance (can self-feed after set-up/food cut)    Oral Facial Hygiene/Grooming: Minimum assistance    Bathing: Maximum assistance (A at baseline with bathing)    Upper Body Dressing: Moderate assistance    Lower Body Dressing: Maximum assistance    Toileting: Moderate assistance                ADL Intervention and task modifications:       Pt received in bed being self-feed by personal home aide. Educated pt on importance of completing tasks as independently as possible as pt has functional strength/ROM to self-feed after set-up/cutting of food. Pt in agreement. Cognitive Retraining  Safety/Judgement: Awareness of environment; Fall prevention;Good awareness of safety precautions; Home safety; Insight into deficits       Functional Measure:  Barthel Index:    Bathing: 0  Bladder: 0  Bowels: 5  Groomin  Dressin  Feedin  Mobility: 0  Stairs: 0  Toilet Use: 5  Transfer (Bed to Chair and Back): 5  Total: 20       Barthel and G-code impairment scale:  Percentage of impairment CH  0% CI  1-19% CJ  20-39% CK  40-59% CL  60-79% CM  80-99% CN  100%   Barthel Score 0-100 100 99-80 79-60 59-40 20-39 1-19   0   Barthel Score 0-20 20 17-19 13-16 9-12 5-8 1-4 0      The Barthel ADL Index: Guidelines  1. The index should be used as a record of what a patient does, not as a record of what a patient could do. 2. The main aim is to establish degree of independence from any help, physical or verbal, however minor and for whatever reason. 3. The need for supervision renders the patient not independent. 4. A patient's performance should be established using the best available evidence. Asking the patient, friends/relatives and nurses are the usual sources, but direct observation and common sense are also important. However direct testing is not needed. 5. Usually the patient's performance over the preceding 24-48 hours is important, but occasionally longer periods will be relevant. 6. Middle categories imply that the patient supplies over 50 per cent of the effort. 7. Use of aids to be independent is allowed. Luz Maria Han., Barthel, D.W. (5902). Functional evaluation: the Barthel Index. [address] [facility] (14)2. TIAGO Clarke, Juan Chavez., Berny Dey., [place], [address] [place] [address] (). Measuring the change indisability after inpatient rehabilitation; comparison of the responsiveness of the Barthel Index and Functional Laurier Measure. Journal of Neurology, Neurosurgery, and Psychiatry, 66(4), 588-514. Ally Souza, N.J.A, RADHA Ramos.J.LATA, & Christos Green M.A. (2004.) Assessment of post-stroke quality of life in cost-effectiveness studies: The usefulness of the Barthel Index and the EuroQoL-5D. Quality of Life Research, 13, 267-78         G codes:   In compliance with CMSs Claims Based Outcome Reporting, the following G-code set was chosen for this patient based on their primary functional limitation being treated: The outcome measure chosen to determine the severity of the functional limitation was the Barthel Index with a score of 20/100 which was correlated with the impairment scale. ? Self Care:     - CURRENT STATUS: CL - 60%-79% impaired, limited or restricted    - GOAL STATUS: CJ - 20%-39% impaired, limited or restricted    - D/C STATUS:  ---------------To be determined---------------     Occupational Therapy Evaluation Charge Determination   History Examination Decision-Making   MEDIUM Complexity : Expanded review of history including physical, cognitive and psychosocial  history  MEDIUM Complexity : 3-5 performance deficits relating to physical, cognitive , or psychosocial skils that result in activity limitations and / or participation restrictions MEDIUM Complexity : Patient may present with comorbidities that affect occupational performnce. Miniml to moderate modification of tasks or assistance (eg, physical or verbal ) with assesment(s) is necessary to enable patient to complete evaluation       Based on the above components, the patient evaluation is determined to be of the following complexity level: MEDIUM  Activity Tolerance:   VSS    Please refer to the flowsheet for vital signs taken during this treatment. After treatment:   [] Patient left in no apparent distress sitting up in chair  [x] Patient left in no apparent distress in bed/chair position  [x] Call bell left within reach  [x] Nursing notified  [x] Caregiver present  [] Bed alarm activated    COMMUNICATION/EDUCATION:   The patients plan of care was discussed with: Physical Therapist and Registered Nurse. [x] Home safety education was provided and the patient/caregiver indicated understanding. [x] Patient/family have participated as able in goal setting and plan of care.   [x] Patient/family agree to work toward stated goals and plan of care. [] Patient understands intent and goals of therapy, but is neutral about his/her participation. [] Patient is unable to participate in goal setting and plan of care. This patients plan of care is appropriate for delegation to ESTHER.     Thank you for this referral.  Tushar Nair, OT  Time Calculation: 18 mins

## 2018-04-20 NOTE — PROGRESS NOTES
Central Line Procedure Note    Indication:  Hemodynamic monitoring, volume access. Risks, benefits, alternatives explained and patient agrees to proceed. Consent obtained. Patient positioned in Trendelenburg. 7-Step Sterility Protocol followed. (cap, mask, sterile gown, sterile gloves, large sterile sheet, hand hygiene, 2% chlorhexidine for cutaneous antisepsis)  5 mL 1% Lidocaine placed at insertion site. Right internal jugular cannulated x 1 attempt(s) utilizing the Seldinger technique and live ultrasound guidance with sterile gel and sheath. .    Venous cannulation confirmed with column drop test.    Quad. Lumen catheter sutured X 3 & Biopatch applied. Sterile Tegaderm placed. CXR pending.       Signed By: Mary Deleon MD  Date:           4/20/2018  Time:          1:13 AM

## 2018-04-21 ENCOUNTER — APPOINTMENT (OUTPATIENT)
Dept: GENERAL RADIOLOGY | Age: 83
DRG: 853 | End: 2018-04-21
Attending: INTERNAL MEDICINE
Payer: MEDICARE

## 2018-04-21 LAB
AFP-TM SERPL-MCNC: 2.5 NG/ML (ref 0–8.3)
ALBUMIN SERPL-MCNC: 3.1 G/DL (ref 3.5–5)
ALBUMIN/GLOB SERPL: 0.8 {RATIO} (ref 1.1–2.2)
ALP SERPL-CCNC: 322 U/L (ref 45–117)
ALT SERPL-CCNC: 25 U/L (ref 12–78)
ANION GAP SERPL CALC-SCNC: 9 MMOL/L (ref 5–15)
AST SERPL-CCNC: 28 U/L (ref 15–37)
BACTERIA SPEC CULT: ABNORMAL
BACTERIA SPEC CULT: ABNORMAL
BASOPHILS # BLD: 0 K/UL (ref 0–0.1)
BASOPHILS NFR BLD: 0 % (ref 0–1)
BILIRUB SERPL-MCNC: 0.7 MG/DL (ref 0.2–1)
BUN SERPL-MCNC: 40 MG/DL (ref 6–20)
BUN/CREAT SERPL: 26 (ref 12–20)
CALCIUM SERPL-MCNC: 9.4 MG/DL (ref 8.5–10.1)
CANCER AG19-9 SERPL-ACNC: 14 U/ML (ref 0–35)
CANCER AG27-29 SERPL-ACNC: 229.9 U/ML (ref 0–38.6)
CC UR VC: ABNORMAL
CHLORIDE SERPL-SCNC: 101 MMOL/L (ref 97–108)
CO2 SERPL-SCNC: 24 MMOL/L (ref 21–32)
CREAT SERPL-MCNC: 1.54 MG/DL (ref 0.55–1.02)
DIFFERENTIAL METHOD BLD: ABNORMAL
EOSINOPHIL # BLD: 0 K/UL (ref 0–0.4)
EOSINOPHIL NFR BLD: 0 % (ref 0–7)
ERYTHROCYTE [DISTWIDTH] IN BLOOD BY AUTOMATED COUNT: 22.5 % (ref 11.5–14.5)
GLOBULIN SER CALC-MCNC: 4.1 G/DL (ref 2–4)
GLUCOSE BLD STRIP.AUTO-MCNC: 205 MG/DL (ref 65–100)
GLUCOSE BLD STRIP.AUTO-MCNC: 215 MG/DL (ref 65–100)
GLUCOSE BLD STRIP.AUTO-MCNC: 227 MG/DL (ref 65–100)
GLUCOSE BLD STRIP.AUTO-MCNC: 239 MG/DL (ref 65–100)
GLUCOSE SERPL-MCNC: 213 MG/DL (ref 65–100)
HCT VFR BLD AUTO: 22.8 % (ref 35–47)
HGB BLD-MCNC: 7.3 G/DL (ref 11.5–16)
IMM GRANULOCYTES # BLD: 0.1 K/UL (ref 0–0.04)
IMM GRANULOCYTES NFR BLD AUTO: 1 % (ref 0–0.5)
LYMPHOCYTES # BLD: 0.9 K/UL (ref 0.8–3.5)
LYMPHOCYTES NFR BLD: 7 % (ref 12–49)
MAGNESIUM SERPL-MCNC: 2.1 MG/DL (ref 1.6–2.4)
MCH RBC QN AUTO: 28.1 PG (ref 26–34)
MCHC RBC AUTO-ENTMCNC: 32 G/DL (ref 30–36.5)
MCV RBC AUTO: 87.7 FL (ref 80–99)
MONOCYTES # BLD: 0.8 K/UL (ref 0–1)
MONOCYTES NFR BLD: 6 % (ref 5–13)
NEUTS SEG # BLD: 10.8 K/UL (ref 1.8–8)
NEUTS SEG NFR BLD: 86 % (ref 32–75)
NRBC # BLD: 0 K/UL (ref 0–0.01)
NRBC BLD-RTO: 0 PER 100 WBC
PHOSPHATE SERPL-MCNC: 3.6 MG/DL (ref 2.6–4.7)
PLATELET # BLD AUTO: 259 K/UL (ref 150–400)
PLATELET COMMENTS,PCOM: ABNORMAL
PMV BLD AUTO: 10.5 FL (ref 8.9–12.9)
POTASSIUM SERPL-SCNC: 3.5 MMOL/L (ref 3.5–5.1)
PROT SERPL-MCNC: 7.2 G/DL (ref 6.4–8.2)
RBC # BLD AUTO: 2.6 M/UL (ref 3.8–5.2)
RBC MORPH BLD: ABNORMAL
SERVICE CMNT-IMP: ABNORMAL
SODIUM SERPL-SCNC: 134 MMOL/L (ref 136–145)
WBC # BLD AUTO: 12.6 K/UL (ref 3.6–11)

## 2018-04-21 PROCEDURE — 74011000250 HC RX REV CODE- 250: Performed by: INTERNAL MEDICINE

## 2018-04-21 PROCEDURE — 74011000258 HC RX REV CODE- 258: Performed by: INTERNAL MEDICINE

## 2018-04-21 PROCEDURE — 71045 X-RAY EXAM CHEST 1 VIEW: CPT

## 2018-04-21 PROCEDURE — 80053 COMPREHEN METABOLIC PANEL: CPT | Performed by: INTERNAL MEDICINE

## 2018-04-21 PROCEDURE — 74011250636 HC RX REV CODE- 250/636: Performed by: HOSPITALIST

## 2018-04-21 PROCEDURE — 85025 COMPLETE CBC W/AUTO DIFF WBC: CPT | Performed by: INTERNAL MEDICINE

## 2018-04-21 PROCEDURE — 74011250637 HC RX REV CODE- 250/637: Performed by: INTERNAL MEDICINE

## 2018-04-21 PROCEDURE — 36415 COLL VENOUS BLD VENIPUNCTURE: CPT | Performed by: INTERNAL MEDICINE

## 2018-04-21 PROCEDURE — 83735 ASSAY OF MAGNESIUM: CPT | Performed by: INTERNAL MEDICINE

## 2018-04-21 PROCEDURE — 36591 DRAW BLOOD OFF VENOUS DEVICE: CPT

## 2018-04-21 PROCEDURE — 82962 GLUCOSE BLOOD TEST: CPT

## 2018-04-21 PROCEDURE — 65610000006 HC RM INTENSIVE CARE

## 2018-04-21 PROCEDURE — 74011250636 HC RX REV CODE- 250/636: Performed by: INTERNAL MEDICINE

## 2018-04-21 PROCEDURE — 84100 ASSAY OF PHOSPHORUS: CPT | Performed by: INTERNAL MEDICINE

## 2018-04-21 PROCEDURE — 74011250637 HC RX REV CODE- 250/637: Performed by: HOSPITALIST

## 2018-04-21 PROCEDURE — 74011636637 HC RX REV CODE- 636/637: Performed by: HOSPITALIST

## 2018-04-21 RX ORDER — CARVEDILOL 3.12 MG/1
3.12 TABLET ORAL 2 TIMES DAILY WITH MEALS
Status: DISCONTINUED | OUTPATIENT
Start: 2018-04-21 | End: 2018-04-23

## 2018-04-21 RX ORDER — SODIUM CHLORIDE 9 MG/ML
250 INJECTION, SOLUTION INTRAVENOUS AS NEEDED
Status: DISCONTINUED | OUTPATIENT
Start: 2018-04-21 | End: 2018-04-21

## 2018-04-21 RX ADMIN — INSULIN LISPRO 3 UNITS: 100 INJECTION, SOLUTION INTRAVENOUS; SUBCUTANEOUS at 07:30

## 2018-04-21 RX ADMIN — CARVEDILOL 3.12 MG: 3.12 TABLET, FILM COATED ORAL at 11:26

## 2018-04-21 RX ADMIN — INSULIN LISPRO 2 UNITS: 100 INJECTION, SOLUTION INTRAVENOUS; SUBCUTANEOUS at 21:32

## 2018-04-21 RX ADMIN — SITAGLIPTIN 50 MG: 25 TABLET, FILM COATED ORAL at 08:31

## 2018-04-21 RX ADMIN — CLOPIDOGREL BISULFATE 75 MG: 75 TABLET ORAL at 08:31

## 2018-04-21 RX ADMIN — GLIMEPIRIDE 1 MG: 1 TABLET ORAL at 06:43

## 2018-04-21 RX ADMIN — INSULIN LISPRO 3 UNITS: 100 INJECTION, SOLUTION INTRAVENOUS; SUBCUTANEOUS at 17:18

## 2018-04-21 RX ADMIN — Medication 10 ML: at 21:32

## 2018-04-21 RX ADMIN — PIPERACILLIN SODIUM,TAZOBACTAM SODIUM 3.38 G: 3; .375 INJECTION, POWDER, FOR SOLUTION INTRAVENOUS at 08:25

## 2018-04-21 RX ADMIN — ASPIRIN 81 MG: 81 TABLET, COATED ORAL at 08:31

## 2018-04-21 RX ADMIN — INSULIN LISPRO 3 UNITS: 100 INJECTION, SOLUTION INTRAVENOUS; SUBCUTANEOUS at 11:34

## 2018-04-21 RX ADMIN — PIPERACILLIN SODIUM,TAZOBACTAM SODIUM 3.38 G: 3; .375 INJECTION, POWDER, FOR SOLUTION INTRAVENOUS at 01:13

## 2018-04-21 RX ADMIN — Medication 1 CAPSULE: at 08:31

## 2018-04-21 RX ADMIN — CARVEDILOL 3.12 MG: 3.12 TABLET, FILM COATED ORAL at 17:12

## 2018-04-21 RX ADMIN — HYDROCORTISONE SODIUM SUCCINATE 100 MG: 100 INJECTION, POWDER, FOR SOLUTION INTRAMUSCULAR; INTRAVENOUS at 17:12

## 2018-04-21 RX ADMIN — Medication 400 MG: at 08:31

## 2018-04-21 RX ADMIN — PIPERACILLIN SODIUM,TAZOBACTAM SODIUM 3.38 G: 3; .375 INJECTION, POWDER, FOR SOLUTION INTRAVENOUS at 17:12

## 2018-04-21 RX ADMIN — VANCOMYCIN HYDROCHLORIDE 750 MG: 750 INJECTION, POWDER, LYOPHILIZED, FOR SOLUTION INTRAVENOUS at 10:56

## 2018-04-21 RX ADMIN — OXYCODONE HYDROCHLORIDE AND ACETAMINOPHEN 500 MG: 500 TABLET ORAL at 08:31

## 2018-04-21 RX ADMIN — LEVOTHYROXINE SODIUM 88 MCG: 88 TABLET ORAL at 06:43

## 2018-04-21 RX ADMIN — Medication 10 ML: at 06:44

## 2018-04-21 RX ADMIN — THERA TABS 1 TABLET: TAB at 08:31

## 2018-04-21 RX ADMIN — SODIUM CHLORIDE 20 MG: 9 INJECTION INTRAMUSCULAR; INTRAVENOUS; SUBCUTANEOUS at 08:26

## 2018-04-21 RX ADMIN — VITAMIN D, TAB 1000IU (100/BT) 1000 UNITS: 25 TAB at 08:31

## 2018-04-21 RX ADMIN — Medication 10 ML: at 17:20

## 2018-04-21 RX ADMIN — HYDROCORTISONE SODIUM SUCCINATE 100 MG: 100 INJECTION, POWDER, FOR SOLUTION INTRAMUSCULAR; INTRAVENOUS at 08:26

## 2018-04-21 RX ADMIN — HYDROCORTISONE SODIUM SUCCINATE 100 MG: 100 INJECTION, POWDER, FOR SOLUTION INTRAMUSCULAR; INTRAVENOUS at 01:14

## 2018-04-21 RX ADMIN — FOLIC ACID 1 MG: 1 TABLET ORAL at 08:31

## 2018-04-21 RX ADMIN — ENOXAPARIN SODIUM 30 MG: 30 INJECTION SUBCUTANEOUS at 10:56

## 2018-04-21 NOTE — PROGRESS NOTES
Problem: Falls - Risk of  Goal: *Absence of Falls  Document Yaron Fall Risk and appropriate interventions in the flowsheet.    Outcome: Progressing Towards Goal  Fall Risk Interventions:  Mobility Interventions: Assess mobility with egress test, Communicate number of staff needed for ambulation/transfer, OT consult for ADLs, Patient to call before getting OOB, PT Consult for mobility concerns, PT Consult for assist device competence, Strengthening exercises (ROM-active/passive), Utilize walker, cane, or other assitive device         Medication Interventions: Evaluate medications/consider consulting pharmacy, Patient to call before getting OOB, Teach patient to arise slowly    Elimination Interventions: Call light in reach, Patient to call for help with toileting needs, Toileting schedule/hourly rounds    History of Falls Interventions: Door open when patient unattended, Room close to nurse's station

## 2018-04-21 NOTE — PROGRESS NOTES
[facility]   [address] [address], [facility] [address], [facility]  Phone: [phone]   [phone]       Nephrology Progress Note  Erick Langford     1935     [id number]  Date of Admission : 4/16/2018 04/21/18    CC: Follow up for JEROD        Assessment and Plan   JEROD on CKD   - Initially from Cardiac event/ NSTEMI   - Now 2/2 Sepsis, Hypotension   - Cr stable this AM  - cont to monitor off IVF     Hyponatremia :  - 2/2 CHF     Sepsis w/ Shock :  - per PCCM     UTI : On abx     CKD Stage III :  - baseline Cr 0.9-1 mg/dl lately      NSTEMI :  - Echo shows EF 35-45%, mod LVH, Severe Hypokinesis of apex and moderate Hypokinesis of anteroseptal wall  - cath plans per Dr Mariah Joe      Acute Systolic CHF: 2/2 MI   - avoid  ACE/ARB     Chronic Pontine Infarcts      ? Brain Mets on MRI and Liver met    hx of colon Ca   Anemia :  - being w/u Onc     Interval History:  Seen and examined. Cr better, BP stable off pressors. Up and eating breakfast.  No cp, sob, n/v/d reported. Review of Systems: Pertinent items are noted in HPI.     Current Medications:   Current Facility-Administered Medications   Medication Dose Route Frequency    carvedilol (COREG) tablet 3.125 mg  3.125 mg Oral BID WITH MEALS    famotidine (PF) (PEPCID) 20 mg in sodium chloride 0.9% 10 mL injection  20 mg IntraVENous DAILY    hydrocortisone Sod Succ (PF) (SOLU-CORTEF) injection 100 mg  100 mg IntraVENous Q8H    piperacillin-tazobactam (ZOSYN) 3.375 g in 0.9% sodium chloride (MBP/ADV) 100 mL  3.375 g IntraVENous Q8H    vancomycin (VANCOCIN) 750 mg in 0.9% sodium chloride (MBP/ADV) 250 mL  750 mg IntraVENous Q36H    NOREPINephrine (LEVOPHED) 8 mg in 5% dextrose 250mL infusion  2-30 mcg/min IntraVENous TITRATE    PHENYLephrine (PF)(RICARDO-SYNEPHRINE) 30 mg in 0.9% sodium chloride 250 mL infusion   mcg/min IntraVENous TITRATE    Vancomycin - pharmacy to dose   Other Rx Dosing/Monitoring    glucose chewable tablet 16 g  4 Tab Oral PRN    dextrose (D50W) injection syrg 12.5-25 g  12.5-25 g IntraVENous PRN    glucagon (GLUCAGEN) injection 1 mg  1 mg IntraMUSCular PRN    insulin lispro (HUMALOG) injection   SubCUTAneous AC&HS    prochlorperazine (COMPAZINE) with saline injection 5 mg  5 mg IntraVENous Q8H PRN    ascorbic acid (vitamin C) (VITAMIN C) tablet 500 mg  500 mg Oral DAILY    aspirin delayed-release tablet 81 mg  81 mg Oral DAILY    cholecalciferol (VITAMIN D3) tablet 1,000 Units  1,000 Units Oral DAILY    clopidogrel (PLAVIX) tablet 75 mg  75 mg Oral DAILY    folic acid (FOLVITE) tablet 1 mg  1 mg Oral DAILY    glimepiride (AMARYL) tablet 1 mg  1 mg Oral 7am    magnesium oxide (MAG-OX) tablet 400 mg  400 mg Oral DAILY    therapeutic multivitamin (THERAGRAN) tablet 1 Tab  1 Tab Oral DAILY    fish oil-omega-3 fatty acids 340-1,000 mg capsule 1 Cap  1 Cap Oral DAILY    SITagliptin (JANUVIA) tablet tab 50 mg  50 mg Oral DAILY    sodium chloride (NS) flush 5-10 mL  5-10 mL IntraVENous Q8H    sodium chloride (NS) flush 5-10 mL  5-10 mL IntraVENous PRN    acetaminophen (TYLENOL) tablet 650 mg  650 mg Oral Q4H PRN    ondansetron (ZOFRAN) injection 4 mg  4 mg IntraVENous Q4H PRN    enoxaparin (LOVENOX) injection 30 mg  30 mg SubCUTAneous Q24H    levothyroxine (SYNTHROID) tablet 88 mcg  88 mcg Oral ACB      Allergies   Allergen Reactions    Statins-Hmg-Coa Reductase Inhibitors Other (comments)     Intolerant to statins     Sulfa (Sulfonamide Antibiotics) Other (comments)     syncope       Objective:  Vitals:    Vitals:    04/21/18 0600 04/21/18 0700 04/21/18 0800 04/21/18 0900   BP: 159/62 157/68 (!) 135/119 147/72   Pulse: 85 87 90 87   Resp: 20 20 21 19   Temp:   97.8 °F (36.6 °C)    SpO2: 98% 97% 95% 95%   Weight:       Height:         Intake and Output:  04/21 0701 - 04/21 1900  In: 100 [I.V.:100]  Out: 65 [Urine:65]  04/19 1901 - 04/21 0700  In: 5103.3 [P.O.:500;  I.V.:4603.3]  Out: 1380 [Urine:1380]    Physical Examination:    General: No distress  Neck:  Supple, no mass  Resp:  Diminished at bases   CV:  RRR, no murmur  GI:  Soft, NT, + BS  Neurologic:  Non focal     []    High complexity decision making was performed  []    Patient is at high-risk of decompensation with multiple organ involvement    Lab Data Personally Reviewed: I have reviewed all the pertinent labs, microbiology data and radiology studies during assessment.     Recent Labs      04/21/18 0413 04/20/18 0430 04/19/18 2036 04/19/18   0328   NA  134*  133*  134*  134*   K  3.5  3.5  3.3*  3.4*   CL  101  99  99  100   CO2  24  24  25  27   GLU  213*  110*  123*  172*   BUN  40*  36*  32*  35*   CREA  1.54*  1.78*  1.53*  1.78*   CA  9.4  8.7  8.2*  8.6   MG  2.1   --    --    --    PHOS  3.6   --    --    --    ALB  3.1*   --   2.2*   --    SGOT  28   --   56*   --    ALT  25   --   29   --      Recent Labs      04/21/18 0413 04/20/18 0430 04/19/18 2036 04/19/18   0328   WBC  12.6*  13.7*  13.3*  12.3*   HGB  7.3*  7.5*  7.6*  8.9*   HCT  22.8*  23.6*  23.8*  27.8*   PLT  259  233  235  289     No results found for: SDES  Lab Results   Component Value Date/Time    Culture result: NO GROWTH 2 DAYS 04/19/2018 08:36 PM    Culture result: NO GROWTH 2 DAYS 04/19/2018 08:36 PM    Culture result: GRAM NEGATIVE RODS (A) 04/19/2018 12:05 PM    Culture result: PSEUDOMONAS SPECIES (1,000 ORGANISMS PER ML) (A) 04/19/2018 12:05 PM    Culture result: NO GROWTH 3 DAYS 04/18/2018 08:15 AM     Recent Results (from the past 24 hour(s))   GLUCOSE, POC    Collection Time: 04/20/18 11:44 AM   Result Value Ref Range    Glucose (POC) 169 (H) 65 - 100 mg/dL    Performed by Cristino Amador., POC    Collection Time: 04/20/18  5:19 PM   Result Value Ref Range    Glucose (POC) 280 (H) 65 - 100 mg/dL    Performed by Cristino Amador., POC    Collection Time: 04/20/18  9:27 PM   Result Value Ref Range    Glucose (POC) 307 (H) 65 - 100 mg/dL    Performed by [place] [employer]    CBC WITH AUTOMATED DIFF    Collection Time: 04/21/18  4:13 AM   Result Value Ref Range    WBC 12.6 (H) 3.6 - 11.0 K/uL    RBC 2.60 (L) 3.80 - 5.20 M/uL    HGB 7.3 (L) 11.5 - 16.0 g/dL    HCT 22.8 (L) 35.0 - 47.0 %    MCV 87.7 80.0 - 99.0 FL    MCH 28.1 26.0 - 34.0 PG    MCHC 32.0 30.0 - 36.5 g/dL    RDW 22.5 (H) 11.5 - 14.5 %    PLATELET 517 097 - 950 K/uL    MPV 10.5 8.9 - 12.9 FL    NRBC 0.0 0  WBC    ABSOLUTE NRBC 0.00 0.00 - 0.01 K/uL    NEUTROPHILS 86 (H) 32 - 75 %    LYMPHOCYTES 7 (L) 12 - 49 %    MONOCYTES 6 5 - 13 %    EOSINOPHILS 0 0 - 7 %    BASOPHILS 0 0 - 1 %    IMMATURE GRANULOCYTES 1 (H) 0.0 - 0.5 %    ABS. NEUTROPHILS 10.8 (H) 1.8 - 8.0 K/UL    ABS. LYMPHOCYTES 0.9 0.8 - 3.5 K/UL    ABS. MONOCYTES 0.8 0.0 - 1.0 K/UL    ABS. EOSINOPHILS 0.0 0.0 - 0.4 K/UL    ABS. BASOPHILS 0.0 0.0 - 0.1 K/UL    ABS. IMM. GRANS. 0.1 (H) 0.00 - 0.04 K/UL    DF SMEAR SCANNED      PLATELET COMMENTS Large Platelets      RBC COMMENTS ANISOCYTOSIS  2+        RBC COMMENTS OVALOCYTES  PRESENT        RBC COMMENTS MACHO CELLS  PRESENT        RBC COMMENTS SCHISTOCYTES  PRESENT        RBC COMMENTS POLYCHROMASIA  1+       METABOLIC PANEL, COMPREHENSIVE    Collection Time: 04/21/18  4:13 AM   Result Value Ref Range    Sodium 134 (L) 136 - 145 mmol/L    Potassium 3.5 3.5 - 5.1 mmol/L    Chloride 101 97 - 108 mmol/L    CO2 24 21 - 32 mmol/L    Anion gap 9 5 - 15 mmol/L    Glucose 213 (H) 65 - 100 mg/dL    BUN 40 (H) 6 - 20 MG/DL    Creatinine 1.54 (H) 0.55 - 1.02 MG/DL    BUN/Creatinine ratio 26 (H) 12 - 20      GFR est AA 39 (L) >60 ml/min/1.73m2    GFR est non-AA 32 (L) >60 ml/min/1.73m2    Calcium 9.4 8.5 - 10.1 MG/DL    Bilirubin, total 0.7 0.2 - 1.0 MG/DL    ALT (SGPT) 25 12 - 78 U/L    AST (SGOT) 28 15 - 37 U/L    Alk.  phosphatase 322 (H) 45 - 117 U/L    Protein, total 7.2 6.4 - 8.2 g/dL    Albumin 3.1 (L) 3.5 - 5.0 g/dL    Globulin 4.1 (H) 2.0 - 4.0 g/dL    A-G Ratio 0.8 (L) 1.1 - 2. 2     MAGNESIUM    Collection Time: 04/21/18  4:13 AM   Result Value Ref Range    Magnesium 2.1 1.6 - 2.4 mg/dL   PHOSPHORUS    Collection Time: 04/21/18  4:13 AM   Result Value Ref Range    Phosphorus 3.6 2.6 - 4.7 MG/DL   GLUCOSE, POC    Collection Time: 04/21/18  8:24 AM   Result Value Ref Range    Glucose (POC) 215 (H) 65 - 100 mg/dL    Performed by Stephany Zhou        I have reviewed the flowsheets. Chart and Pertinent Notes have been reviewed. No change in PMH ,family and social history from Consult note.       Lara Cook MD

## 2018-04-21 NOTE — PROGRESS NOTES
0800: Report received from Fay Laguna RN using SBAR format. Care assumed. Pt resting in bed, watching tv. Denies any c/o pain. Dr Joyce Miranda in to see pt. D/w pt results from bone scan study. See doc flow sheets for assessment. 0900: Son at bedside. Requesting to speak with Dr Joyce Miranda. Paged placed. NP Angelina notified of request. Son's name and number provided. 0915: Dr Cheryle Lockett completing [address] [facility] rounds. 0930: Dr Chula Barkley at bedside. 0940: Dr Noman Purcell at bedside. 1930: Report given to Vassie Carrel, RN using SBAR format.

## 2018-04-21 NOTE — PROGRESS NOTES
PCCM  D/w ICU RN. From PCCM standpoint can go to [facility] or tele. Will see as needed.

## 2018-04-21 NOTE — PROGRESS NOTES
Hematology-Oncology Progress Note    Evelyne Ott  1935  [id number]  4/21/2018    Follow-up for: abnormal brain MRI     [x]        Chart notes since last visit reviewed   [x]        Medications reviewed for allergies and interactions       Case discussed with the following:         []                            []        Nursing Staff                                                                         []        Pathologist                                                                        []        FAMILY      Subjective:     Spoke with patient who complains of: pt is comfortable awake, alert    Objective:     Patient Vitals for the past 24 hrs:   BP Temp Pulse Resp SpO2 Weight   04/21/18 0900 147/72 - 87 19 95 % -   04/21/18 0800 (!) 135/119 97.8 °F (36.6 °C) 90 21 95 % -   04/21/18 0700 157/68 - 87 20 97 % -   04/21/18 0600 159/62 - 85 20 98 % -   04/21/18 0500 147/66 - 84 19 - -   04/21/18 0400 129/49 97.8 °F (36.6 °C) 80 15 97 % -   04/21/18 0300 132/59 - 85 17 96 % -   04/21/18 0200 136/58 - 85 18 95 % -   04/21/18 0100 136/62 - 83 17 98 % -   04/21/18 0000 139/60 97.8 °F (36.6 °C) 92 21 98 % 54.2 kg (119 lb 7.8 oz)   04/20/18 2300 114/57 - 91 17 97 % -   04/20/18 2200 - - 89 20 97 % -   04/20/18 2100 156/71 - 89 20 98 % -   04/20/18 2000 148/63 97.7 °F (36.5 °C) 86 20 97 % -   04/20/18 1900 116/84 - 87 14 97 % -   04/20/18 1800 121/66 - 85 18 97 % -   04/20/18 1700 133/57 - 87 17 96 % -   04/20/18 1600 126/51 98.1 °F (36.7 °C) 85 17 96 % -   04/20/18 1530 117/50 - 81 17 95 % -   04/20/18 1500 124/60 - 83 20 97 % -   04/20/18 1445 124/56 - 85 19 97 % -   04/20/18 1430 (!) 116/93 - 85 19 97 % -   04/20/18 1426 128/58 - 85 18 97 % -   04/20/18 1330 - - 88 - 93 % -   04/20/18 1304 - - - - - 64 kg (141 lb)   04/20/18 1230 105/45 - 84 - 90 % -   04/20/18 1145 112/48 98.1 °F (36.7 °C) 82 21 95 % -   04/20/18 1100 129/58 - 89 23 97 % -   04/20/18 1000 113/58 - 82 23 97 % -       REVIEW OF SYSTEMS:    Constitutional: negative fever, negative chills, negative weight loss  Eyes:   negative visual changes  ENT:   negative sore throat, tongue or lip swelling  Respiratory:  negative cough, negative dyspnea  Cards:  negative for chest pain, palpitations, lower extremity edema  GI:   negative for nausea, vomiting, diarrhea, and abdominal pain  Neuro:  negative for headaches, dizziness, vertigo  []                        Full ROS o/w normal/non contributor    Constitutional:  Patient looks  []        Sick  [x]        Frail  []        Better                                                 []        Depressed    HEENT:  [x]   NC                         []   AT               []    ALOPECIA           Eyes: [x]   Normal               []    Icteric  Oropharynx: []    Normal                  []  Thrush               []   Dry  Mucositis: []    None                 Grade: []        I  []        II  []        III  []        IV  Neck:   [x]   Supple                  []  Rigid      Breast.. Left upper outer quadrant mass            Lungs clear anteriorly  Cor RRR   Abd.  Soft non tender  Skin:  Intact [x]           Purpura []        Rash: []   ABSENT       []  PRESENT  Neuro:  [x]        Normal  []        Confused      Available labs reviewed:  Labs:    Recent Results (from the past 24 hour(s))   GLUCOSE, POC    Collection Time: 04/20/18 11:44 AM   Result Value Ref Range    Glucose (POC) 169 (H) 65 - 100 mg/dL    Performed by Cristino Dominguez 62., POC    Collection Time: 04/20/18  5:19 PM   Result Value Ref Range    Glucose (POC) 280 (H) 65 - 100 mg/dL    Performed by Cristino Dominguez 62., POC    Collection Time: 04/20/18  9:27 PM   Result Value Ref Range    Glucose (POC) 307 (H) 65 - 100 mg/dL    Performed by Nae Warner    CBC WITH AUTOMATED DIFF    Collection Time: 04/21/18  4:13 AM   Result Value Ref Range    WBC 12.6 (H) 3.6 - 11.0 K/uL    RBC 2.60 (L) 3.80 - 5.20 M/uL    HGB 7.3 (L) 11.5 - 16.0 g/dL    HCT 22.8 (L) 35.0 - 47.0 %    MCV 87.7 80.0 - 99.0 FL    MCH 28.1 26.0 - 34.0 PG    MCHC 32.0 30.0 - 36.5 g/dL    RDW 22.5 (H) 11.5 - 14.5 %    PLATELET 051 659 - 370 K/uL    MPV 10.5 8.9 - 12.9 FL    NRBC 0.0 0  WBC    ABSOLUTE NRBC 0.00 0.00 - 0.01 K/uL    NEUTROPHILS 86 (H) 32 - 75 %    LYMPHOCYTES 7 (L) 12 - 49 %    MONOCYTES 6 5 - 13 %    EOSINOPHILS 0 0 - 7 %    BASOPHILS 0 0 - 1 %    IMMATURE GRANULOCYTES 1 (H) 0.0 - 0.5 %    ABS. NEUTROPHILS 10.8 (H) 1.8 - 8.0 K/UL    ABS. LYMPHOCYTES 0.9 0.8 - 3.5 K/UL    ABS. MONOCYTES 0.8 0.0 - 1.0 K/UL    ABS. EOSINOPHILS 0.0 0.0 - 0.4 K/UL    ABS. BASOPHILS 0.0 0.0 - 0.1 K/UL    ABS. IMM. GRANS. 0.1 (H) 0.00 - 0.04 K/UL    DF SMEAR SCANNED      PLATELET COMMENTS Large Platelets      RBC COMMENTS ANISOCYTOSIS  2+        RBC COMMENTS OVALOCYTES  PRESENT        RBC COMMENTS MACHO CELLS  PRESENT        RBC COMMENTS SCHISTOCYTES  PRESENT        RBC COMMENTS POLYCHROMASIA  1+       METABOLIC PANEL, COMPREHENSIVE    Collection Time: 04/21/18  4:13 AM   Result Value Ref Range    Sodium 134 (L) 136 - 145 mmol/L    Potassium 3.5 3.5 - 5.1 mmol/L    Chloride 101 97 - 108 mmol/L    CO2 24 21 - 32 mmol/L    Anion gap 9 5 - 15 mmol/L    Glucose 213 (H) 65 - 100 mg/dL    BUN 40 (H) 6 - 20 MG/DL    Creatinine 1.54 (H) 0.55 - 1.02 MG/DL    BUN/Creatinine ratio 26 (H) 12 - 20      GFR est AA 39 (L) >60 ml/min/1.73m2    GFR est non-AA 32 (L) >60 ml/min/1.73m2    Calcium 9.4 8.5 - 10.1 MG/DL    Bilirubin, total 0.7 0.2 - 1.0 MG/DL    ALT (SGPT) 25 12 - 78 U/L    AST (SGOT) 28 15 - 37 U/L    Alk.  phosphatase 322 (H) 45 - 117 U/L    Protein, total 7.2 6.4 - 8.2 g/dL    Albumin 3.1 (L) 3.5 - 5.0 g/dL    Globulin 4.1 (H) 2.0 - 4.0 g/dL    A-G Ratio 0.8 (L) 1.1 - 2.2     MAGNESIUM    Collection Time: 04/21/18  4:13 AM   Result Value Ref Range    Magnesium 2.1 1.6 - 2.4 mg/dL   PHOSPHORUS    Collection Time: 04/21/18  4:13 AM   Result Value Ref Range    Phosphorus 3.6 2.6 - 4.7 MG/DL   GLUCOSE, POC    Collection Time: 04/21/18  8:24 AM   Result Value Ref Range    Glucose (POC) 215 (H) 65 - 100 mg/dL    Performed by Franca Franklin        Available Xrays reviewed:    Chemotherapy monitored and toxicities assessed:    Assessment and Plan   1. Probable metastatic breast cancer. The bone scan shows diffuse mets, the [id number] anc cea are elevated and the pt has a palpable left breast mass,,, She needs a biopsy, would consult [facility] breast surgeon on Monday for core bx so we can get hormonal recepter data,,, suspect the is ER + given the duration of mass in left breast and hx of breast bx at [facility] several years ago,   If so she could very well respond nicely to femara +/- ibrance. .    2. Anemia. .. Pt has bone mets, suspect this is due to chronic disease +- marrow involvement    Agueda Duvall MD      3. CHF. Bk Palmer Per cardiology. . They are considering doing cardiac cath  Agueda Duvall MD

## 2018-04-21 NOTE — PROGRESS NOTES
Cardiology Progress Note            Admit Date: 4/16/2018  Admit Diagnosis: SOB (shortness of breath)  Date: 4/21/2018     Time: 2:01 PM    HPI: 82 y.o. Female with new diagnosis of CHF. Presented with chief c/o of SOB, found to have pulmonary edema and EF 40% on TTE. Troponin elevated and trending up, EKG with poor R wave progression and now inverted t waves   In anterior leads. Complicated pt, consider palliative options     Assessment and Plan     1. NSTEMI:  Troponin trending down from 18.9     -no chest pain   -TTE:  EF 40%, severe hypokinesis of apical wals, mod hypk AS wall Mod LVH, Mild-mod MR, mild TR   -Continue ASA, coreg, plavix   -Statin intolerant additionally LFTs elevated   -Plan for LHC on hold with probable metastatic dz    2. Cardiomyopathy/Acute HFrEF, new:  EF 40% NYHA IV on admission. Suspect NYHA III today    -improved clinically but still appears to be in failure   -Coreg held   -No Ace-I due to elevated creatinine   -Today bp up restart low dose bblocker and low dose lasix as tolerated    3. JEROD on CKD:   creatinine 1.63-1.55 on today's labs from 1.61.    -Baseline creat 0.9-1 mg/dl   -Renal US with nonobstructing L kidney stone. And a hepatic lesion   -Nephrology following    4. Anemia: hgb 9.7 4/18   -management per renal/primary team    5. Hx of essential HTN; bp currently controlled   -restart coreg    6. Fever:  Tmax 101.1 overnight, now normalized   -CBC ordered   -Monitor/management per primary team.    7. Elevated LFTs:  Trending up today.   -No statin    -Management/workup per primary team.    8. Hx of Colon cancer: Had findings on MRI concerning for mets. -Outpt bone scan was planned per chart. - reordered today     BP stable, restart bblocker. Look at palliative options. Subjective:   Sandy Ledesma denies chest pain, abdominal pain, nausea. Note abd fullness.     Objective:      Physical Exam: Visit Vitals    /72    Pulse 87    Temp 97.8 °F (36.6 °C)    Resp 19    Ht 5' 1\" (1.549 m)    Wt 119 lb 7.8 oz (54.2 kg)    SpO2 95%    BMI 22.58 kg/m2          General Appearance:   Sickly looking, elderly alert female in no acute distress. Ears/Nose/Mouth/Throat:    Hearing grossly normal.         Neck:  Supple. Chest:    Lungs with bibasilar crackles. Off O2   Cardiovascular:    Regular rate and rhythm, S1, S2 normal, no murmur. Abdomen:    Soft, non-tender, bowel sounds are active. Extremities:  No edema bilaterally. Skin:  Warm and dry.      Telemetry: NSR          Data Review:    Labs:    Recent Results (from the past 24 hour(s))   CEA    Collection Time: 04/20/18  9:30 AM   Result Value Ref Range    CEA 11.5 ng/mL   CORTISOL    Collection Time: 04/20/18  9:30 AM   Result Value Ref Range    Cortisol, random 31.1 ug/dL   GLUCOSE, POC    Collection Time: 04/20/18 11:44 AM   Result Value Ref Range    Glucose (POC) 169 (H) 65 - 100 mg/dL    Performed by Cristino Dominguez 62., POC    Collection Time: 04/20/18  5:19 PM   Result Value Ref Range    Glucose (POC) 280 (H) 65 - 100 mg/dL    Performed by Cristino Amador., POC    Collection Time: 04/20/18  9:27 PM   Result Value Ref Range    Glucose (POC) 307 (H) 65 - 100 mg/dL    Performed by Alvis Baumgarten    CBC WITH AUTOMATED DIFF    Collection Time: 04/21/18  4:13 AM   Result Value Ref Range    WBC 12.6 (H) 3.6 - 11.0 K/uL    RBC 2.60 (L) 3.80 - 5.20 M/uL    HGB 7.3 (L) 11.5 - 16.0 g/dL    HCT 22.8 (L) 35.0 - 47.0 %    MCV 87.7 80.0 - 99.0 FL    MCH 28.1 26.0 - 34.0 PG    MCHC 32.0 30.0 - 36.5 g/dL    RDW 22.5 (H) 11.5 - 14.5 %    PLATELET 677 441 - 227 K/uL    MPV 10.5 8.9 - 12.9 FL    NRBC 0.0 0  WBC    ABSOLUTE NRBC 0.00 0.00 - 0.01 K/uL    NEUTROPHILS 86 (H) 32 - 75 %    LYMPHOCYTES 7 (L) 12 - 49 %    MONOCYTES 6 5 - 13 %    EOSINOPHILS 0 0 - 7 %    BASOPHILS 0 0 - 1 %    IMMATURE GRANULOCYTES 1 (H) 0.0 - 0.5 %    ABS. NEUTROPHILS 10.8 (H) 1.8 - 8.0 K/UL    ABS. LYMPHOCYTES 0.9 0.8 - 3.5 K/UL    ABS. MONOCYTES 0.8 0.0 - 1.0 K/UL    ABS. EOSINOPHILS 0.0 0.0 - 0.4 K/UL    ABS. BASOPHILS 0.0 0.0 - 0.1 K/UL    ABS. IMM. GRANS. 0.1 (H) 0.00 - 0.04 K/UL    DF SMEAR SCANNED      PLATELET COMMENTS Large Platelets      RBC COMMENTS ANISOCYTOSIS  2+        RBC COMMENTS OVALOCYTES  PRESENT        RBC COMMENTS MACHO CELLS  PRESENT        RBC COMMENTS SCHISTOCYTES  PRESENT        RBC COMMENTS POLYCHROMASIA  1+       METABOLIC PANEL, COMPREHENSIVE    Collection Time: 04/21/18  4:13 AM   Result Value Ref Range    Sodium 134 (L) 136 - 145 mmol/L    Potassium 3.5 3.5 - 5.1 mmol/L    Chloride 101 97 - 108 mmol/L    CO2 24 21 - 32 mmol/L    Anion gap 9 5 - 15 mmol/L    Glucose 213 (H) 65 - 100 mg/dL    BUN 40 (H) 6 - 20 MG/DL    Creatinine 1.54 (H) 0.55 - 1.02 MG/DL    BUN/Creatinine ratio 26 (H) 12 - 20      GFR est AA 39 (L) >60 ml/min/1.73m2    GFR est non-AA 32 (L) >60 ml/min/1.73m2    Calcium 9.4 8.5 - 10.1 MG/DL    Bilirubin, total 0.7 0.2 - 1.0 MG/DL    ALT (SGPT) 25 12 - 78 U/L    AST (SGOT) 28 15 - 37 U/L    Alk.  phosphatase 322 (H) 45 - 117 U/L    Protein, total 7.2 6.4 - 8.2 g/dL    Albumin 3.1 (L) 3.5 - 5.0 g/dL    Globulin 4.1 (H) 2.0 - 4.0 g/dL    A-G Ratio 0.8 (L) 1.1 - 2.2     MAGNESIUM    Collection Time: 04/21/18  4:13 AM   Result Value Ref Range    Magnesium 2.1 1.6 - 2.4 mg/dL   PHOSPHORUS    Collection Time: 04/21/18  4:13 AM   Result Value Ref Range    Phosphorus 3.6 2.6 - 4.7 MG/DL          Radiology:        Current Facility-Administered Medications   Medication Dose Route Frequency    famotidine (PF) (PEPCID) 20 mg in sodium chloride 0.9% 10 mL injection  20 mg IntraVENous DAILY    hydrocortisone Sod Succ (PF) (SOLU-CORTEF) injection 100 mg  100 mg IntraVENous Q8H    piperacillin-tazobactam (ZOSYN) 3.375 g in 0.9% sodium chloride (MBP/ADV) 100 mL  3.375 g IntraVENous Q8H    vancomycin (VANCOCIN) 750 mg in 0.9% sodium chloride (MBP/ADV) 250 mL  750 mg IntraVENous Q36H    NOREPINephrine (LEVOPHED) 8 mg in 5% dextrose 250mL infusion  2-30 mcg/min IntraVENous TITRATE    PHENYLephrine (PF)(RICARDO-SYNEPHRINE) 30 mg in 0.9% sodium chloride 250 mL infusion   mcg/min IntraVENous TITRATE    Vancomycin - pharmacy to dose   Other Rx Dosing/Monitoring    glucose chewable tablet 16 g  4 Tab Oral PRN    dextrose (D50W) injection syrg 12.5-25 g  12.5-25 g IntraVENous PRN    glucagon (GLUCAGEN) injection 1 mg  1 mg IntraMUSCular PRN    insulin lispro (HUMALOG) injection   SubCUTAneous AC&HS    prochlorperazine (COMPAZINE) with saline injection 5 mg  5 mg IntraVENous Q8H PRN    ascorbic acid (vitamin C) (VITAMIN C) tablet 500 mg  500 mg Oral DAILY    aspirin delayed-release tablet 81 mg  81 mg Oral DAILY    cholecalciferol (VITAMIN D3) tablet 1,000 Units  1,000 Units Oral DAILY    clopidogrel (PLAVIX) tablet 75 mg  75 mg Oral DAILY    folic acid (FOLVITE) tablet 1 mg  1 mg Oral DAILY    glimepiride (AMARYL) tablet 1 mg  1 mg Oral 7am    magnesium oxide (MAG-OX) tablet 400 mg  400 mg Oral DAILY    therapeutic multivitamin (THERAGRAN) tablet 1 Tab  1 Tab Oral DAILY    fish oil-omega-3 fatty acids 340-1,000 mg capsule 1 Cap  1 Cap Oral DAILY    SITagliptin (JANUVIA) tablet tab 50 mg  50 mg Oral DAILY    sodium chloride (NS) flush 5-10 mL  5-10 mL IntraVENous Q8H    sodium chloride (NS) flush 5-10 mL  5-10 mL IntraVENous PRN    acetaminophen (TYLENOL) tablet 650 mg  650 mg Oral Q4H PRN    ondansetron (ZOFRAN) injection 4 mg  4 mg IntraVENous Q4H PRN    enoxaparin (LOVENOX) injection 30 mg  30 mg SubCUTAneous Q24H    levothyroxine (SYNTHROID) tablet 88 mcg  88 mcg Oral ACB          Freddy Taylor MD     [facility] [address] [facility] [address] [facility] [address]   [address] [facility]   [phone]

## 2018-04-21 NOTE — PROGRESS NOTES
Hospitalist Progress Note            Daily Progress Note: 2018    Assessment/Plan:   1. Sepsis and septic shock sec to UTI: Started on IV antibiotics (Vanc and zosyn) f/u on blcx, ucx. ucx with pseudomonas and E.coli. Stopped vanc. Off pressors. 2. NSTEMI - Appreciate cardiology help; continue ASA, Plavix as per cardiology; No ACEI or ARBs sec to JEROD, low BP. Cath plane per cardiology. Started on low dose coreg. 3. Acute systolic CHF likely sec to No#1 - EF 35 - 45% by echo on  (down from 60% by echo in ); Lasix restarted. 4. JEROD on CKD-3 : Improving. Worsened sec to hypotension; appreciate nephrology input. 5. HTN:better off pressors. Back on lasix and coreg. 6. DM - on Januvia and Amaryl  7. H/o hypothyroidism - continue Synthroid  8. GERD  9. Elevated LFTs: likely shock liver. Holding statin. H/o colon CA 25 years ago - according to son, there is a work-up planned with [facility] regarding the possibility of recurrent CA (based on lesions seen in the calvarium on MRI - 3/19). Consulted oncology, biopsy of breast nodule on mon, mets to brain, bone, liver. Subjective:     became hypotensive did not respond IVF boluses and albumin, ICU transfer for pressors. : no acute events. Review of Systems:   No c/o CP, talking. Objective:     Visit Vitals    /90    Pulse 83    Temp 97.8 °F (36.6 °C)    Resp 16    Ht 5' 1\" (1.549 m)    Wt 54.2 kg (119 lb 7.8 oz)    SpO2 95%    BMI 22.58 kg/m2    O2 Flow Rate (L/min): 2 l/min O2 Device: Room air    Temp (24hrs), Av.8 °F (36.6 °C), Min:97.7 °F (36.5 °C), Max:97.8 °F (36.6 °C)      701 - 1900  In: 350 [I.V.:350]  Out: 385 [Urine:385]    1901 -  0700  In: 5103.3 [P.O.:500; I.V.:4603.3]  Out: 1380 [Urine:1380]    EXAM:  General: NAD. alert  HEENT: NC, atraumatic,   Neck:   Supple,   Lungs:  Bibasilar crackles. No Wheezing.   Heart:  Regular rhythm,   Abdomen: Soft, Non distended, Non tender.  +Bowel sounds,  Extremities: No c/c/e  Neurologic:   Alert,. No acute neurological distress         Data Review:     Recent Results (from the past 24 hour(s))   GLUCOSE, POC    Collection Time: 04/20/18  9:27 PM   Result Value Ref Range    Glucose (POC) 307 (H) 65 - 100 mg/dL    Performed by Ingrid Suarez    CBC WITH AUTOMATED DIFF    Collection Time: 04/21/18  4:13 AM   Result Value Ref Range    WBC 12.6 (H) 3.6 - 11.0 K/uL    RBC 2.60 (L) 3.80 - 5.20 M/uL    HGB 7.3 (L) 11.5 - 16.0 g/dL    HCT 22.8 (L) 35.0 - 47.0 %    MCV 87.7 80.0 - 99.0 FL    MCH 28.1 26.0 - 34.0 PG    MCHC 32.0 30.0 - 36.5 g/dL    RDW 22.5 (H) 11.5 - 14.5 %    PLATELET 223 365 - 067 K/uL    MPV 10.5 8.9 - 12.9 FL    NRBC 0.0 0  WBC    ABSOLUTE NRBC 0.00 0.00 - 0.01 K/uL    NEUTROPHILS 86 (H) 32 - 75 %    LYMPHOCYTES 7 (L) 12 - 49 %    MONOCYTES 6 5 - 13 %    EOSINOPHILS 0 0 - 7 %    BASOPHILS 0 0 - 1 %    IMMATURE GRANULOCYTES 1 (H) 0.0 - 0.5 %    ABS. NEUTROPHILS 10.8 (H) 1.8 - 8.0 K/UL    ABS. LYMPHOCYTES 0.9 0.8 - 3.5 K/UL    ABS. MONOCYTES 0.8 0.0 - 1.0 K/UL    ABS. EOSINOPHILS 0.0 0.0 - 0.4 K/UL    ABS. BASOPHILS 0.0 0.0 - 0.1 K/UL    ABS. IMM.  GRANS. 0.1 (H) 0.00 - 0.04 K/UL    DF SMEAR SCANNED      PLATELET COMMENTS Large Platelets      RBC COMMENTS ANISOCYTOSIS  2+        RBC COMMENTS OVALOCYTES  PRESENT        RBC COMMENTS MACHO CELLS  PRESENT        RBC COMMENTS SCHISTOCYTES  PRESENT        RBC COMMENTS POLYCHROMASIA  1+       METABOLIC PANEL, COMPREHENSIVE    Collection Time: 04/21/18  4:13 AM   Result Value Ref Range    Sodium 134 (L) 136 - 145 mmol/L    Potassium 3.5 3.5 - 5.1 mmol/L    Chloride 101 97 - 108 mmol/L    CO2 24 21 - 32 mmol/L    Anion gap 9 5 - 15 mmol/L    Glucose 213 (H) 65 - 100 mg/dL    BUN 40 (H) 6 - 20 MG/DL    Creatinine 1.54 (H) 0.55 - 1.02 MG/DL    BUN/Creatinine ratio 26 (H) 12 - 20      GFR est AA 39 (L) >60 ml/min/1.73m2    GFR est non-AA 32 (L) >60 ml/min/1.73m2    Calcium 9.4 8.5 - 10.1 MG/DL    Bilirubin, total 0.7 0.2 - 1.0 MG/DL    ALT (SGPT) 25 12 - 78 U/L    AST (SGOT) 28 15 - 37 U/L    Alk.  phosphatase 322 (H) 45 - 117 U/L    Protein, total 7.2 6.4 - 8.2 g/dL    Albumin 3.1 (L) 3.5 - 5.0 g/dL    Globulin 4.1 (H) 2.0 - 4.0 g/dL    A-G Ratio 0.8 (L) 1.1 - 2.2     MAGNESIUM    Collection Time: 04/21/18  4:13 AM   Result Value Ref Range    Magnesium 2.1 1.6 - 2.4 mg/dL   PHOSPHORUS    Collection Time: 04/21/18  4:13 AM   Result Value Ref Range    Phosphorus 3.6 2.6 - 4.7 MG/DL   GLUCOSE, POC    Collection Time: 04/21/18  8:24 AM   Result Value Ref Range    Glucose (POC) 215 (H) 65 - 100 mg/dL    Performed by Cristino Dominguez 62., POC    Collection Time: 04/21/18 11:30 AM   Result Value Ref Range    Glucose (POC) 205 (H) 65 - 100 mg/dL    Performed by Cristino Amador., POC    Collection Time: 04/21/18  5:10 PM   Result Value Ref Range    Glucose (POC) 239 (H) 65 - 100 mg/dL    Performed by Freddy Sharp        Principal Problem:    SOB (shortness of breath) (4/16/2018)        Medications reviewed  Current Facility-Administered Medications   Medication Dose Route Frequency    carvedilol (COREG) tablet 3.125 mg  3.125 mg Oral BID WITH MEALS    famotidine (PF) (PEPCID) 20 mg in sodium chloride 0.9% 10 mL injection  20 mg IntraVENous DAILY    hydrocortisone Sod Succ (PF) (SOLU-CORTEF) injection 100 mg  100 mg IntraVENous Q8H    piperacillin-tazobactam (ZOSYN) 3.375 g in 0.9% sodium chloride (MBP/ADV) 100 mL  3.375 g IntraVENous Q8H    NOREPINephrine (LEVOPHED) 8 mg in 5% dextrose 250mL infusion  2-30 mcg/min IntraVENous TITRATE    PHENYLephrine (PF)(RICARDO-SYNEPHRINE) 30 mg in 0.9% sodium chloride 250 mL infusion   mcg/min IntraVENous TITRATE    glucose chewable tablet 16 g  4 Tab Oral PRN    dextrose (D50W) injection syrg 12.5-25 g  12.5-25 g IntraVENous PRN    glucagon (GLUCAGEN) injection 1 mg  1 mg IntraMUSCular PRN    insulin lispro (HUMALOG) injection   SubCUTAneous AC&HS    prochlorperazine (COMPAZINE) with saline injection 5 mg  5 mg IntraVENous Q8H PRN    ascorbic acid (vitamin C) (VITAMIN C) tablet 500 mg  500 mg Oral DAILY    aspirin delayed-release tablet 81 mg  81 mg Oral DAILY    cholecalciferol (VITAMIN D3) tablet 1,000 Units  1,000 Units Oral DAILY    clopidogrel (PLAVIX) tablet 75 mg  75 mg Oral DAILY    folic acid (FOLVITE) tablet 1 mg  1 mg Oral DAILY    glimepiride (AMARYL) tablet 1 mg  1 mg Oral 7am    magnesium oxide (MAG-OX) tablet 400 mg  400 mg Oral DAILY    therapeutic multivitamin (THERAGRAN) tablet 1 Tab  1 Tab Oral DAILY    fish oil-omega-3 fatty acids 340-1,000 mg capsule 1 Cap  1 Cap Oral DAILY    SITagliptin (JANUVIA) tablet tab 50 mg  50 mg Oral DAILY    sodium chloride (NS) flush 5-10 mL  5-10 mL IntraVENous Q8H    sodium chloride (NS) flush 5-10 mL  5-10 mL IntraVENous PRN    acetaminophen (TYLENOL) tablet 650 mg  650 mg Oral Q4H PRN    ondansetron (ZOFRAN) injection 4 mg  4 mg IntraVENous Q4H PRN    enoxaparin (LOVENOX) injection 30 mg  30 mg SubCUTAneous Q24H    levothyroxine (SYNTHROID) tablet 88 mcg  88 mcg Oral ACB       DVT prophylaxis: Lovenox SC    Total time spent with patient: 30 minutes    Shahbaz Paul MD

## 2018-04-21 NOTE — ROUTINE PROCESS
1930. Bedside, Verbal and Written shift change report given to Juanjose Hernandez RN (oncoming nurse) by Brendon Bravo RN (offgoing nurse). Report included the following information SBAR, Kardex, ED Summary, Intake/Output, MAR, Accordion, Recent Results, Med Rec Status, Cardiac Rhythm NSR and Alarm Parameters . 0730. Bedside, Verbal and Written shift change report given to Brendon Bravo RN (oncoming nurse) by Juanjose Hernandez RN (offgoing nurse). Report included the following information SBAR, Kardex, ED Summary, OR Summary, Procedure Summary, Intake/Output, MAR, Accordion, Recent Results, Med Rec Status, Cardiac Rhythm SR and Alarm Parameters .

## 2018-04-21 NOTE — PROGRESS NOTES
2000. Assumed care of pt; Afebrile; pt eating dinner; 2 sons at the bedside; Titrating levophed to keep MAP>65    0000. No changes; pt resting; son at the bedside    0220. Levophed infusion stopped; pt sleeping    0730.  Report given to oncoming RN

## 2018-04-22 ENCOUNTER — APPOINTMENT (OUTPATIENT)
Dept: CT IMAGING | Age: 83
DRG: 853 | End: 2018-04-22
Attending: INTERNAL MEDICINE
Payer: MEDICARE

## 2018-04-22 ENCOUNTER — APPOINTMENT (OUTPATIENT)
Dept: GENERAL RADIOLOGY | Age: 83
DRG: 853 | End: 2018-04-22
Attending: INTERNAL MEDICINE
Payer: MEDICARE

## 2018-04-22 LAB
ANION GAP SERPL CALC-SCNC: 9 MMOL/L (ref 5–15)
APTT PPP: 32.1 SEC (ref 22.1–32)
ARTERIAL PATENCY WRIST A: YES
ATRIAL RATE: 119 BPM
BACTERIA SPEC CULT: ABNORMAL
BACTERIA SPEC CULT: ABNORMAL
BASE DEFICIT BLD-SCNC: 3 MMOL/L
BDY SITE: ABNORMAL
BUN SERPL-MCNC: 42 MG/DL (ref 6–20)
BUN/CREAT SERPL: 27 (ref 12–20)
CALCIUM SERPL-MCNC: 10.3 MG/DL (ref 8.5–10.1)
CALCULATED P AXIS, ECG09: 61 DEGREES
CALCULATED R AXIS, ECG10: 64 DEGREES
CALCULATED T AXIS, ECG11: -102 DEGREES
CC UR VC: ABNORMAL
CHLORIDE SERPL-SCNC: 102 MMOL/L (ref 97–108)
CK MB CFR SERPL CALC: 2.9 % (ref 0–2.5)
CK MB CFR SERPL CALC: NORMAL % (ref 0–2.5)
CK MB SERPL-MCNC: 1.2 NG/ML (ref 5–25)
CK MB SERPL-MCNC: <1 NG/ML (ref 5–25)
CK SERPL-CCNC: 41 U/L (ref 26–192)
CK SERPL-CCNC: 42 U/L (ref 26–192)
CO2 SERPL-SCNC: 25 MMOL/L (ref 21–32)
CREAT SERPL-MCNC: 1.53 MG/DL (ref 0.55–1.02)
D DIMER PPP FEU-MCNC: 13.33 MG/L FEU (ref 0–0.65)
DIAGNOSIS, 93000: NORMAL
ERYTHROCYTE [DISTWIDTH] IN BLOOD BY AUTOMATED COUNT: 22.6 % (ref 11.5–14.5)
GAS FLOW.O2 O2 DELIVERY SYS: ABNORMAL L/MIN
GAS FLOW.O2 SETTING OXYMISER: 2 L/M
GLUCOSE BLD STRIP.AUTO-MCNC: 204 MG/DL (ref 65–100)
GLUCOSE BLD STRIP.AUTO-MCNC: 215 MG/DL (ref 65–100)
GLUCOSE BLD STRIP.AUTO-MCNC: 260 MG/DL (ref 65–100)
GLUCOSE BLD STRIP.AUTO-MCNC: 278 MG/DL (ref 65–100)
GLUCOSE BLD STRIP.AUTO-MCNC: 290 MG/DL (ref 65–100)
GLUCOSE SERPL-MCNC: 195 MG/DL (ref 65–100)
HCO3 BLD-SCNC: 24.3 MMOL/L (ref 22–26)
HCT VFR BLD AUTO: 26.4 % (ref 35–47)
HGB BLD-MCNC: 8.5 G/DL (ref 11.5–16)
MCH RBC QN AUTO: 28.4 PG (ref 26–34)
MCHC RBC AUTO-ENTMCNC: 32.2 G/DL (ref 30–36.5)
MCV RBC AUTO: 88.3 FL (ref 80–99)
NRBC # BLD: 0 K/UL (ref 0–0.01)
NRBC BLD-RTO: 0 PER 100 WBC
O2/TOTAL GAS SETTING VFR VENT: 0.24 %
P-R INTERVAL, ECG05: 144 MS
PCO2 BLD: 56.4 MMHG (ref 35–45)
PH BLD: 7.24 [PH] (ref 7.35–7.45)
PLATELET # BLD AUTO: 379 K/UL (ref 150–400)
PMV BLD AUTO: 10.2 FL (ref 8.9–12.9)
PO2 BLD: 75 MMHG (ref 80–100)
POTASSIUM SERPL-SCNC: 3.1 MMOL/L (ref 3.5–5.1)
Q-T INTERVAL, ECG07: 328 MS
QRS DURATION, ECG06: 100 MS
QTC CALCULATION (BEZET), ECG08: 461 MS
RBC # BLD AUTO: 2.99 M/UL (ref 3.8–5.2)
SAO2 % BLD: 92 % (ref 92–97)
SERVICE CMNT-IMP: ABNORMAL
SODIUM SERPL-SCNC: 136 MMOL/L (ref 136–145)
SPECIMEN TYPE: ABNORMAL
THERAPEUTIC RANGE,PTTT: ABNORMAL SECS (ref 58–77)
TOTAL RESP. RATE, ITRR: 24
TROPONIN I SERPL-MCNC: 1.48 NG/ML
TROPONIN I SERPL-MCNC: 1.5 NG/ML
VENTRICULAR RATE, ECG03: 119 BPM
WBC # BLD AUTO: 18.4 K/UL (ref 3.6–11)

## 2018-04-22 PROCEDURE — 74011250637 HC RX REV CODE- 250/637

## 2018-04-22 PROCEDURE — 74011250636 HC RX REV CODE- 250/636: Performed by: INTERNAL MEDICINE

## 2018-04-22 PROCEDURE — 77030013140 HC MSK NEB VYRM -A

## 2018-04-22 PROCEDURE — 74011000258 HC RX REV CODE- 258: Performed by: INTERNAL MEDICINE

## 2018-04-22 PROCEDURE — 82962 GLUCOSE BLOOD TEST: CPT

## 2018-04-22 PROCEDURE — 36415 COLL VENOUS BLD VENIPUNCTURE: CPT | Performed by: HOSPITALIST

## 2018-04-22 PROCEDURE — 85027 COMPLETE CBC AUTOMATED: CPT | Performed by: HOSPITALIST

## 2018-04-22 PROCEDURE — 82803 BLOOD GASES ANY COMBINATION: CPT

## 2018-04-22 PROCEDURE — 36600 WITHDRAWAL OF ARTERIAL BLOOD: CPT

## 2018-04-22 PROCEDURE — 93041 RHYTHM ECG TRACING: CPT

## 2018-04-22 PROCEDURE — 74011000250 HC RX REV CODE- 250: Performed by: INTERNAL MEDICINE

## 2018-04-22 PROCEDURE — 87086 URINE CULTURE/COLONY COUNT: CPT | Performed by: INTERNAL MEDICINE

## 2018-04-22 PROCEDURE — 87040 BLOOD CULTURE FOR BACTERIA: CPT | Performed by: INTERNAL MEDICINE

## 2018-04-22 PROCEDURE — 77030029684 HC NEB SM VOL KT MONA -A

## 2018-04-22 PROCEDURE — 80048 BASIC METABOLIC PNL TOTAL CA: CPT | Performed by: HOSPITALIST

## 2018-04-22 PROCEDURE — 65610000006 HC RM INTENSIVE CARE

## 2018-04-22 PROCEDURE — 74011250636 HC RX REV CODE- 250/636: Performed by: HOSPITALIST

## 2018-04-22 PROCEDURE — 85379 FIBRIN DEGRADATION QUANT: CPT | Performed by: INTERNAL MEDICINE

## 2018-04-22 PROCEDURE — 74011250637 HC RX REV CODE- 250/637: Performed by: HOSPITALIST

## 2018-04-22 PROCEDURE — 74011000250 HC RX REV CODE- 250

## 2018-04-22 PROCEDURE — 74011250637 HC RX REV CODE- 250/637: Performed by: INTERNAL MEDICINE

## 2018-04-22 PROCEDURE — 85730 THROMBOPLASTIN TIME PARTIAL: CPT | Performed by: INTERNAL MEDICINE

## 2018-04-22 PROCEDURE — 82550 ASSAY OF CK (CPK): CPT | Performed by: INTERNAL MEDICINE

## 2018-04-22 PROCEDURE — 94660 CPAP INITIATION&MGMT: CPT

## 2018-04-22 PROCEDURE — 71045 X-RAY EXAM CHEST 1 VIEW: CPT

## 2018-04-22 PROCEDURE — 94640 AIRWAY INHALATION TREATMENT: CPT

## 2018-04-22 PROCEDURE — 70450 CT HEAD/BRAIN W/O DYE: CPT

## 2018-04-22 PROCEDURE — 74011250636 HC RX REV CODE- 250/636

## 2018-04-22 PROCEDURE — 74011636637 HC RX REV CODE- 636/637: Performed by: HOSPITALIST

## 2018-04-22 PROCEDURE — 84484 ASSAY OF TROPONIN QUANT: CPT | Performed by: INTERNAL MEDICINE

## 2018-04-22 PROCEDURE — 77030012879 HC MSK CPAP FLL FAC PHIL -B

## 2018-04-22 RX ORDER — METOPROLOL TARTRATE 5 MG/5ML
INJECTION INTRAVENOUS
Status: DISPENSED
Start: 2018-04-22 | End: 2018-04-22

## 2018-04-22 RX ORDER — HEPARIN SODIUM 10000 [USP'U]/100ML
12-25 INJECTION, SOLUTION INTRAVENOUS
Status: DISCONTINUED | OUTPATIENT
Start: 2018-04-22 | End: 2018-04-22

## 2018-04-22 RX ORDER — FUROSEMIDE 10 MG/ML
40 INJECTION INTRAMUSCULAR; INTRAVENOUS 2 TIMES DAILY
Status: DISCONTINUED | OUTPATIENT
Start: 2018-04-22 | End: 2018-04-23

## 2018-04-22 RX ORDER — MORPHINE SULFATE 4 MG/ML
INJECTION, SOLUTION INTRAMUSCULAR; INTRAVENOUS
Status: COMPLETED
Start: 2018-04-22 | End: 2018-04-22

## 2018-04-22 RX ORDER — POTASSIUM CHLORIDE 14.9 MG/ML
10 INJECTION INTRAVENOUS
Status: COMPLETED | OUTPATIENT
Start: 2018-04-22 | End: 2018-04-28

## 2018-04-22 RX ORDER — HYDROCORTISONE SODIUM SUCCINATE 100 MG/2ML
50 INJECTION, POWDER, FOR SOLUTION INTRAMUSCULAR; INTRAVENOUS EVERY 8 HOURS
Status: DISCONTINUED | OUTPATIENT
Start: 2018-04-22 | End: 2018-04-23

## 2018-04-22 RX ORDER — NITROGLYCERIN 20 MG/100ML
10 INJECTION INTRAVENOUS
Status: DISCONTINUED | OUTPATIENT
Start: 2018-04-22 | End: 2018-04-22

## 2018-04-22 RX ORDER — LORAZEPAM 0.5 MG/1
0.5 TABLET ORAL
Status: DISCONTINUED | OUTPATIENT
Start: 2018-04-22 | End: 2018-04-24

## 2018-04-22 RX ORDER — NITROGLYCERIN 20 MG/100ML
0-20 INJECTION INTRAVENOUS
Status: DISCONTINUED | OUTPATIENT
Start: 2018-04-22 | End: 2018-04-23

## 2018-04-22 RX ORDER — LOSARTAN POTASSIUM 25 MG/1
25 TABLET ORAL DAILY
Status: DISCONTINUED | OUTPATIENT
Start: 2018-04-22 | End: 2018-04-24

## 2018-04-22 RX ORDER — NITROGLYCERIN 0.4 MG/1
TABLET SUBLINGUAL
Status: COMPLETED
Start: 2018-04-22 | End: 2018-04-22

## 2018-04-22 RX ORDER — MORPHINE SULFATE 4 MG/ML
2 INJECTION, SOLUTION INTRAMUSCULAR; INTRAVENOUS ONCE
Status: COMPLETED | OUTPATIENT
Start: 2018-04-22 | End: 2018-04-22

## 2018-04-22 RX ORDER — FUROSEMIDE 10 MG/ML
40 INJECTION INTRAMUSCULAR; INTRAVENOUS ONCE
Status: DISCONTINUED | OUTPATIENT
Start: 2018-04-22 | End: 2018-04-22 | Stop reason: ALTCHOICE

## 2018-04-22 RX ORDER — FUROSEMIDE 10 MG/ML
INJECTION INTRAMUSCULAR; INTRAVENOUS
Status: COMPLETED
Start: 2018-04-22 | End: 2018-04-22

## 2018-04-22 RX ORDER — METOPROLOL TARTRATE 5 MG/5ML
5 INJECTION INTRAVENOUS ONCE
Status: COMPLETED | OUTPATIENT
Start: 2018-04-22 | End: 2018-04-22

## 2018-04-22 RX ORDER — FUROSEMIDE 10 MG/ML
40 INJECTION INTRAMUSCULAR; INTRAVENOUS ONCE
Status: COMPLETED | OUTPATIENT
Start: 2018-04-22 | End: 2018-04-22

## 2018-04-22 RX ORDER — IPRATROPIUM BROMIDE AND ALBUTEROL SULFATE 2.5; .5 MG/3ML; MG/3ML
3 SOLUTION RESPIRATORY (INHALATION)
Status: DISCONTINUED | OUTPATIENT
Start: 2018-04-22 | End: 2018-05-11 | Stop reason: HOSPADM

## 2018-04-22 RX ADMIN — INSULIN LISPRO 290 UNITS: 100 INJECTION, SOLUTION INTRAVENOUS; SUBCUTANEOUS at 06:40

## 2018-04-22 RX ADMIN — INSULIN LISPRO 5 UNITS: 100 INJECTION, SOLUTION INTRAVENOUS; SUBCUTANEOUS at 11:37

## 2018-04-22 RX ADMIN — Medication 10 ML: at 01:22

## 2018-04-22 RX ADMIN — PIPERACILLIN SODIUM,TAZOBACTAM SODIUM 3.38 G: 3; .375 INJECTION, POWDER, FOR SOLUTION INTRAVENOUS at 08:50

## 2018-04-22 RX ADMIN — LABETALOL HYDROCHLORIDE 0.5 MG/MIN: 5 INJECTION INTRAVENOUS at 07:28

## 2018-04-22 RX ADMIN — POTASSIUM CHLORIDE 10 MEQ: 200 INJECTION, SOLUTION INTRAVENOUS at 14:59

## 2018-04-22 RX ADMIN — LEVOTHYROXINE SODIUM 88 MCG: 88 TABLET ORAL at 07:30

## 2018-04-22 RX ADMIN — NITROGLYCERIN 10 MCG/MIN: 20 INJECTION INTRAVENOUS at 06:08

## 2018-04-22 RX ADMIN — Medication 10 ML: at 05:05

## 2018-04-22 RX ADMIN — Medication 10 ML: at 13:52

## 2018-04-22 RX ADMIN — HYDROCORTISONE SODIUM SUCCINATE 100 MG: 100 INJECTION, POWDER, FOR SOLUTION INTRAMUSCULAR; INTRAVENOUS at 01:21

## 2018-04-22 RX ADMIN — INSULIN LISPRO 5 UNITS: 100 INJECTION, SOLUTION INTRAVENOUS; SUBCUTANEOUS at 17:57

## 2018-04-22 RX ADMIN — LORAZEPAM 0.5 MG: 0.5 TABLET ORAL at 07:20

## 2018-04-22 RX ADMIN — FUROSEMIDE 40 MG: 10 INJECTION, SOLUTION INTRAMUSCULAR; INTRAVENOUS at 03:00

## 2018-04-22 RX ADMIN — MORPHINE SULFATE 2 MG: 4 INJECTION, SOLUTION INTRAMUSCULAR; INTRAVENOUS at 06:55

## 2018-04-22 RX ADMIN — HYDROCORTISONE SODIUM SUCCINATE 50 MG: 100 INJECTION, POWDER, FOR SOLUTION INTRAMUSCULAR; INTRAVENOUS at 17:59

## 2018-04-22 RX ADMIN — INSULIN LISPRO 2 UNITS: 100 INJECTION, SOLUTION INTRAVENOUS; SUBCUTANEOUS at 21:44

## 2018-04-22 RX ADMIN — CARVEDILOL 3.12 MG: 3.12 TABLET, FILM COATED ORAL at 17:58

## 2018-04-22 RX ADMIN — SODIUM CHLORIDE 20 MG: 9 INJECTION INTRAMUSCULAR; INTRAVENOUS; SUBCUTANEOUS at 08:49

## 2018-04-22 RX ADMIN — IPRATROPIUM BROMIDE AND ALBUTEROL SULFATE 3 ML: .5; 3 SOLUTION RESPIRATORY (INHALATION) at 02:50

## 2018-04-22 RX ADMIN — POTASSIUM CHLORIDE 10 MEQ: 200 INJECTION, SOLUTION INTRAVENOUS at 11:24

## 2018-04-22 RX ADMIN — Medication 10 ML: at 21:37

## 2018-04-22 RX ADMIN — HEPARIN SODIUM AND DEXTROSE 12 UNITS/KG/HR: 10000; 5 INJECTION INTRAVENOUS at 06:15

## 2018-04-22 RX ADMIN — NITROGLYCERIN: 0.4 TABLET SUBLINGUAL at 05:45

## 2018-04-22 RX ADMIN — HYDROCORTISONE SODIUM SUCCINATE 100 MG: 100 INJECTION, POWDER, FOR SOLUTION INTRAMUSCULAR; INTRAVENOUS at 08:49

## 2018-04-22 RX ADMIN — POTASSIUM CHLORIDE 10 MEQ: 200 INJECTION, SOLUTION INTRAVENOUS at 12:18

## 2018-04-22 RX ADMIN — POTASSIUM CHLORIDE 10 MEQ: 200 INJECTION, SOLUTION INTRAVENOUS at 13:52

## 2018-04-22 RX ADMIN — FUROSEMIDE 40 MG: 10 INJECTION, SOLUTION INTRAMUSCULAR; INTRAVENOUS at 13:03

## 2018-04-22 RX ADMIN — SODIUM CHLORIDE 5 MG: 9 INJECTION INTRAMUSCULAR; INTRAVENOUS; SUBCUTANEOUS at 08:37

## 2018-04-22 RX ADMIN — FUROSEMIDE 40 MG: 10 INJECTION, SOLUTION INTRAMUSCULAR; INTRAVENOUS at 06:50

## 2018-04-22 RX ADMIN — METOPROLOL TARTRATE 5 MG: 5 INJECTION, SOLUTION INTRAVENOUS at 06:00

## 2018-04-22 RX ADMIN — PIPERACILLIN SODIUM,TAZOBACTAM SODIUM 3.38 G: 3; .375 INJECTION, POWDER, FOR SOLUTION INTRAVENOUS at 17:58

## 2018-04-22 RX ADMIN — Medication 10 ML: at 03:00

## 2018-04-22 RX ADMIN — GLIMEPIRIDE 1 MG: 1 TABLET ORAL at 07:00

## 2018-04-22 RX ADMIN — PIPERACILLIN SODIUM,TAZOBACTAM SODIUM 3.38 G: 3; .375 INJECTION, POWDER, FOR SOLUTION INTRAVENOUS at 01:22

## 2018-04-22 NOTE — PROGRESS NOTES
Hospitalist Progress Note            Daily Progress Note: 2018    Assessment/Plan:   1. Sepsis and septic shock sec to E. Coli UTI: Continue zosyn. f/u on blcx. ucx with pseudomonas ( 1000 colonies) and E.coli. Stopped vanc. Off pressors. Will decrease stress dose steroids and stop it tomorrow. 2.   Flash pulm edema with high BP: BiPAP,  Nitro, labetolol drip, IV lasix. 2. NSTEMI - Appreciate cardiology help; continue ASA, Plavix as per cardiology; No ACEI or ARBs sec to JEROD, low BP. Cath plane per cardiology. Started on low dose coreg and Cozaar. 3. Acute systolic CHF likely sec to No#1 - EF 35 - 45% by echo on  (down from 60% by echo in ); Lasix restarted. 4. JEROD on CKD-3 : Improving. Worsened sec to hypotension; appreciate nephrology input. 5. HTN:labile, better off pressors. Back on lasix and coreg, Cozaar. 6. DM - on Januvia and Amaryl  7. H/o hypothyroidism - continue Synthroid  8. GERD  9. Elevated LFTs: likely shock liver. Holding statin , monitor. H/o colon CA 25 years ago - according to son, there is a work-up planned with [facility] regarding the possibility of recurrent CA (based on lesions seen in the calvarium on MRI - 3/19). Consulted oncology, biopsy of breast nodule on mon, mets to brain, bone, liver. Subjective:    F/U for SOB and HF.   became hypotensive did not respond IVF boluses and albumin, ICU transfer for pressors. : no acute events. : over night developed flash pulm edema with high BP. Review of Systems:   No c/o CP, talking.     Objective:     Visit Vitals    /57    Pulse 74    Temp 97.9 °F (36.6 °C)    Resp 11    Ht 5' 1\" (1.549 m)    Wt 60.1 kg (132 lb 7.9 oz)    SpO2 100%    BMI 25.03 kg/m2    O2 Flow Rate (L/min): 3 l/min O2 Device: BIPAP    Temp (24hrs), Av.7 °F (36.5 °C), Min:97.6 °F (36.4 °C), Max:97.9 °F (36.6 °C)      701 - 1900  In: 367 [I.V.:367]  Out: 255 [Urine:255]    1901 - 04/22 0700  In: 2521.4 [P.O.:750; I.V.:1771.4]  Out: 2535 [Urine:2759]    EXAM:  General: NAD. Sleeping on BiPAP. HEENT: NC, atraumatic,   Neck:   Supple,   Lungs:  Bibasilar crackles. No Wheezing. Heart:  Regular rhythm,   Abdomen: Soft, Non distended, Non tender.  +Bowel sounds,  Extremities: No c/c/e  Neurologic:  Sleeping on BiPAP.          Data Review:     Recent Results (from the past 24 hour(s))   GLUCOSE, POC    Collection Time: 04/21/18  5:10 PM   Result Value Ref Range    Glucose (POC) 239 (H) 65 - 100 mg/dL    Performed by Cristino Amador., POC    Collection Time: 04/21/18  9:26 PM   Result Value Ref Range    Glucose (POC) 227 (H) 65 - 100 mg/dL    Performed by Kendra Ray    METABOLIC PANEL, BASIC    Collection Time: 04/22/18  4:13 AM   Result Value Ref Range    Sodium 136 136 - 145 mmol/L    Potassium 3.1 (L) 3.5 - 5.1 mmol/L    Chloride 102 97 - 108 mmol/L    CO2 25 21 - 32 mmol/L    Anion gap 9 5 - 15 mmol/L    Glucose 195 (H) 65 - 100 mg/dL    BUN 42 (H) 6 - 20 MG/DL    Creatinine 1.53 (H) 0.55 - 1.02 MG/DL    BUN/Creatinine ratio 27 (H) 12 - 20      GFR est AA 39 (L) >60 ml/min/1.73m2    GFR est non-AA 33 (L) >60 ml/min/1.73m2    Calcium 10.3 (H) 8.5 - 10.1 MG/DL   CBC W/O DIFF    Collection Time: 04/22/18  4:13 AM   Result Value Ref Range    WBC 18.4 (H) 3.6 - 11.0 K/uL    RBC 2.99 (L) 3.80 - 5.20 M/uL    HGB 8.5 (L) 11.5 - 16.0 g/dL    HCT 26.4 (L) 35.0 - 47.0 %    MCV 88.3 80.0 - 99.0 FL    MCH 28.4 26.0 - 34.0 PG    MCHC 32.2 30.0 - 36.5 g/dL    RDW 22.6 (H) 11.5 - 14.5 %    PLATELET 126 959 - 208 K/uL    MPV 10.2 8.9 - 12.9 FL    NRBC 0.0 0  WBC    ABSOLUTE NRBC 0.00 0.00 - 0.01 K/uL   GLUCOSE, POC    Collection Time: 04/22/18  4:31 AM   Result Value Ref Range    Glucose (POC) 204 (H) 65 - 100 mg/dL    Performed by Kenny Steel    TROPONIN I    Collection Time: 04/22/18  5:06 AM   Result Value Ref Range    Troponin-I, Qt. 1.48 (H) <0.05 ng/mL   CK W/ CKMB & INDEX    Collection Time: 04/22/18  5:06 AM   Result Value Ref Range    CK 42 26 - 192 U/L    CK - MB <1.0 <3.6 NG/ML    CK-MB Index Cannot be calculated 0 - 2.5     ECG RHYTHM ANALYSIS ADULT    Collection Time: 04/22/18  5:16 AM   Result Value Ref Range    Ventricular Rate 119 BPM    Atrial Rate 119 BPM    P-R Interval 144 ms    QRS Duration 100 ms    Q-T Interval 328 ms    QTC Calculation (Bezet) 461 ms    Calculated P Axis 61 degrees    Calculated R Axis 64 degrees    Calculated T Axis -102 degrees    Diagnosis       Sinus tachycardia ivcd    When compared with ECG of 20-APR-2018 05:13,  Nonspecific ST-T wave changes  Confirmed by Samantha Turner M.D., [address] [place] [address] ([id number]) on 4/22/2018 11:21:59 AM     GLUCOSE, POC    Collection Time: 04/22/18  6:33 AM   Result Value Ref Range    Glucose (POC) 290 (H) 65 - 100 mg/dL    Performed by Travis You    PTT    Collection Time: 04/22/18  6:39 AM   Result Value Ref Range    aPTT 32.1 (H) 22.1 - 32.0 sec    aPTT, therapeutic range     58.0 - 77.0 SECS   D DIMER    Collection Time: 04/22/18  7:35 AM   Result Value Ref Range    D-dimer 13.33 (H) 0.00 - 0.65 mg/L FEU   TROPONIN I    Collection Time: 04/22/18  7:35 AM   Result Value Ref Range    Troponin-I, Qt. 1.50 (H) <0.05 ng/mL   CK W/ CKMB & INDEX    Collection Time: 04/22/18  7:35 AM   Result Value Ref Range    CK 41 26 - 192 U/L    CK - MB 1.2 <3.6 NG/ML    CK-MB Index 2.9 (H) 0 - 2.5     POC G3 - PUL    Collection Time: 04/22/18  7:56 AM   Result Value Ref Range    FIO2 (POC) 0.24 %    pH (POC) 7.242 (LL) 7.35 - 7.45      pCO2 (POC) 56.4 (H) 35.0 - 45.0 MMHG    pO2 (POC) 75 (L) 80 - 100 MMHG    HCO3 (POC) 24.3 22 - 26 MMOL/L    sO2 (POC) 92 92 - 97 %    Base deficit (POC) 3 mmol/L    Site RIGHT RADIAL      Device: NASAL CANNULA      Flow rate (POC) 2 L/M    Allens test (POC) YES      Specimen type (POC) ARTERIAL      Total resp.  rate 24     GLUCOSE, POC    Collection Time: 04/22/18 11:27 AM   Result Value Ref Range Glucose (POC) 278 (H) 65 - 100 mg/dL    Performed by Tresa Griffith        Principal Problem:    SOB (shortness of breath) (4/16/2018)        Medications reviewed  Current Facility-Administered Medications   Medication Dose Route Frequency    albuterol-ipratropium (DUO-NEB) 2.5 MG-0.5 MG/3 ML  3 mL Nebulization Q6H PRN    metoprolol (LOPRESSOR) 5 mg/5 mL injection        nitroglycerin (Tridil) 200 mcg/ml infusion  0-20 mcg/min IntraVENous TITRATE    labetalol (NORMODYNE;TRANDATE) 300 mg in 0.9% sodium chloride 150 mL (2 mg / 1 mL) infusion  0.5-2 mg/min IntraVENous TITRATE    LORazepam (ATIVAN) tablet 0.5 mg  0.5 mg Oral Q6H PRN    losartan (COZAAR) tablet 25 mg  25 mg Oral DAILY    potassium chloride 10 mEq in 50 ml IVPB  10 mEq IntraVENous Q1H    furosemide (LASIX) injection 40 mg  40 mg IntraVENous BID    carvedilol (COREG) tablet 3.125 mg  3.125 mg Oral BID WITH MEALS    famotidine (PF) (PEPCID) 20 mg in sodium chloride 0.9% 10 mL injection  20 mg IntraVENous DAILY    hydrocortisone Sod Succ (PF) (SOLU-CORTEF) injection 100 mg  100 mg IntraVENous Q8H    piperacillin-tazobactam (ZOSYN) 3.375 g in 0.9% sodium chloride (MBP/ADV) 100 mL  3.375 g IntraVENous Q8H    NOREPINephrine (LEVOPHED) 8 mg in 5% dextrose 250mL infusion  2-30 mcg/min IntraVENous TITRATE    PHENYLephrine (PF)(RICARDO-SYNEPHRINE) 30 mg in 0.9% sodium chloride 250 mL infusion   mcg/min IntraVENous TITRATE    glucose chewable tablet 16 g  4 Tab Oral PRN    dextrose (D50W) injection syrg 12.5-25 g  12.5-25 g IntraVENous PRN    glucagon (GLUCAGEN) injection 1 mg  1 mg IntraMUSCular PRN    insulin lispro (HUMALOG) injection   SubCUTAneous AC&HS    prochlorperazine (COMPAZINE) with saline injection 5 mg  5 mg IntraVENous Q8H PRN    ascorbic acid (vitamin C) (VITAMIN C) tablet 500 mg  500 mg Oral DAILY    aspirin delayed-release tablet 81 mg  81 mg Oral DAILY    cholecalciferol (VITAMIN D3) tablet 1,000 Units  1,000 Units Oral DAILY    clopidogrel (PLAVIX) tablet 75 mg  75 mg Oral DAILY    folic acid (FOLVITE) tablet 1 mg  1 mg Oral DAILY    glimepiride (AMARYL) tablet 1 mg  1 mg Oral 7am    magnesium oxide (MAG-OX) tablet 400 mg  400 mg Oral DAILY    therapeutic multivitamin (THERAGRAN) tablet 1 Tab  1 Tab Oral DAILY    fish oil-omega-3 fatty acids 340-1,000 mg capsule 1 Cap  1 Cap Oral DAILY    SITagliptin (JANUVIA) tablet tab 50 mg  50 mg Oral DAILY    sodium chloride (NS) flush 5-10 mL  5-10 mL IntraVENous Q8H    sodium chloride (NS) flush 5-10 mL  5-10 mL IntraVENous PRN    acetaminophen (TYLENOL) tablet 650 mg  650 mg Oral Q4H PRN    ondansetron (ZOFRAN) injection 4 mg  4 mg IntraVENous Q4H PRN    levothyroxine (SYNTHROID) tablet 88 mcg  88 mcg Oral ACB       DVT prophylaxis: Lovenox SC    Total time spent with patient: 30 minutes    Juliet Duran MD

## 2018-04-22 NOTE — PROGRESS NOTES
0730: Bedside and Verbal shift change report given to Stephy RN (oncoming nurse) by Brigid RN (offgoing nurse). Report included the following information SBAR, Kardex, Intake/Output, MAR, Recent Results and Cardiac Rhythm NSR.     0756: Critical ABG results. 0830: Assessment completed. Patient lethargic, oriented to self, following minimal commands. Patient on 3L NC with scattered wheezing and labored breathing, pt c/o SOB. Labetolol gtt and nitroglycerine gtt infusing for SBP <160.  0845: MD aware of ABG results, orders received for Bipap. 0930: Patient placed on Bipap. 1020: Labetolol and nitroglycerin gtts stopped. 1200: Re-assessment completed, patient continues to be lethargic, following minimal commands, minimal eye opening, unable to answer simple questions, withdrawals all extremities from pain, PERRL. Urine output < 30 mL x3 hrs, nephrology paged. 1230: Orders received for 40 mg lasix bid. 1530: Patient continues to be lethargic, pulmonologist paged. 1545: Orders received for repeat ABG and head CT.   1700: Patient placed on 2L NC and taken to CT scan. 1730: Returned from CT scan, patient alert, oriented to person and time and following commands. Patient kept on 2L NC to eat dinner, agreeable to placing bipap on overnight. 1930: Bedside and Verbal shift change report given to Brigid RN (oncoming nurse) by Lu Meehan RN (offgoing nurse). Report included the following information SBAR, Kardex, Intake/Output, MAR, Recent Results and Cardiac Rhythm NSR.

## 2018-04-22 NOTE — PROGRESS NOTES
[facility]   [address] [address], [facility] [address], [facility]  Phone: [phone]   [phone]       Nephrology Progress Note  Evelyne Ott     1935     [id number]  Date of Admission : 4/16/2018 04/22/18    CC: Follow up for JEROD        Assessment and Plan   JEROD on CKD   - Initially from Cardiac event/ NSTEMI + hypotension/sepsis  - Cr stable  - cont current diuresis     Labile HTN:  - leading to flash pulm edema  - diurese as needed  - will likely need additional lasix this PM  - wean nitro and labetalol off as tolerated    Sepsis w/ Shock :  - per PCCM     UTI : On abx     Hypercarbic resp failure:  - may need BiPAP  - per pulm    CKD Stage III :  - baseline Cr 0.9-1 mg/dl lately      NSTEMI :  - Echo shows EF 35-45%, mod LVH, Severe Hypokinesis of apex and moderate Hypokinesis of anteroseptal wall  - cath plans per Dr Saba Toro      Acute Systolic CHF: 2/2 MI   - avoid  ACE/ARB     Chronic Pontine Infarcts      ? Brain Mets on MRI and Liver met    hx of colon Ca     Anemia :  - per heme/onc     Interval History:  Seen and examined. Increased CO2 retention, flash pulm edema and HTN. Placed on nitro drip and labetalol drip. UOP stable with lasix 80mg IV overnight. Still lethargic this AM    Review of Systems: Pertinent items are noted in HPI.     Current Medications:   Current Facility-Administered Medications   Medication Dose Route Frequency    albuterol-ipratropium (DUO-NEB) 2.5 MG-0.5 MG/3 ML  3 mL Nebulization Q6H PRN    metoprolol (LOPRESSOR) 5 mg/5 mL injection        nitroglycerin (Tridil) 200 mcg/ml infusion  0-20 mcg/min IntraVENous TITRATE    labetalol (NORMODYNE;TRANDATE) 300 mg in 0.9% sodium chloride 150 mL (2 mg / 1 mL) infusion  0.5-2 mg/min IntraVENous TITRATE    LORazepam (ATIVAN) tablet 0.5 mg  0.5 mg Oral Q6H PRN    losartan (COZAAR) tablet 25 mg  25 mg Oral DAILY    carvedilol (COREG) tablet 3.125 mg  3.125 mg Oral BID WITH MEALS    famotidine (PF) (PEPCID) 20 mg in sodium chloride 0.9% 10 mL injection  20 mg IntraVENous DAILY    hydrocortisone Sod Succ (PF) (SOLU-CORTEF) injection 100 mg  100 mg IntraVENous Q8H    piperacillin-tazobactam (ZOSYN) 3.375 g in 0.9% sodium chloride (MBP/ADV) 100 mL  3.375 g IntraVENous Q8H    NOREPINephrine (LEVOPHED) 8 mg in 5% dextrose 250mL infusion  2-30 mcg/min IntraVENous TITRATE    PHENYLephrine (PF)(RICARDO-SYNEPHRINE) 30 mg in 0.9% sodium chloride 250 mL infusion   mcg/min IntraVENous TITRATE    glucose chewable tablet 16 g  4 Tab Oral PRN    dextrose (D50W) injection syrg 12.5-25 g  12.5-25 g IntraVENous PRN    glucagon (GLUCAGEN) injection 1 mg  1 mg IntraMUSCular PRN    insulin lispro (HUMALOG) injection   SubCUTAneous AC&HS    prochlorperazine (COMPAZINE) with saline injection 5 mg  5 mg IntraVENous Q8H PRN    ascorbic acid (vitamin C) (VITAMIN C) tablet 500 mg  500 mg Oral DAILY    aspirin delayed-release tablet 81 mg  81 mg Oral DAILY    cholecalciferol (VITAMIN D3) tablet 1,000 Units  1,000 Units Oral DAILY    clopidogrel (PLAVIX) tablet 75 mg  75 mg Oral DAILY    folic acid (FOLVITE) tablet 1 mg  1 mg Oral DAILY    glimepiride (AMARYL) tablet 1 mg  1 mg Oral 7am    magnesium oxide (MAG-OX) tablet 400 mg  400 mg Oral DAILY    therapeutic multivitamin (THERAGRAN) tablet 1 Tab  1 Tab Oral DAILY    fish oil-omega-3 fatty acids 340-1,000 mg capsule 1 Cap  1 Cap Oral DAILY    SITagliptin (JANUVIA) tablet tab 50 mg  50 mg Oral DAILY    sodium chloride (NS) flush 5-10 mL  5-10 mL IntraVENous Q8H    sodium chloride (NS) flush 5-10 mL  5-10 mL IntraVENous PRN    acetaminophen (TYLENOL) tablet 650 mg  650 mg Oral Q4H PRN    ondansetron (ZOFRAN) injection 4 mg  4 mg IntraVENous Q4H PRN    levothyroxine (SYNTHROID) tablet 88 mcg  88 mcg Oral ACB      Allergies   Allergen Reactions    Statins-Hmg-Coa Reductase Inhibitors Other (comments)     Intolerant to statins     Sulfa (Sulfonamide Antibiotics) Other (comments)     syncope       Objective:  Vitals:    Vitals:    04/22/18 0600 04/22/18 0700 04/22/18 0800 04/22/18 0945   BP: (!) 180/91 (!) 175/95 147/79    Pulse: (!) 102 (!) 109 87    Resp: 25 18 19    Temp:   96.6 °F (35.9 °C)    SpO2: 99% 95% 95% 100%   Weight:       Height:         Intake and Output:  04/22 0701 - 04/22 1900  In: 23.5 [I.V.:23.5]  Out: 125 [Urine:125]  04/20 1901 - 04/22 0700  In: 2521.4 [P.O.:750; I.V.:1771.4]  Out: 2846 [Urine:2759]    Physical Examination:    General: lethargic  Neck:  Supple, no mass  Resp:  Diminished at bases   CV:  RRR, no murmur  GI:  Soft, NT, + BS  Neurologic:  Non focal     []    High complexity decision making was performed  []    Patient is at high-risk of decompensation with multiple organ involvement    Lab Data Personally Reviewed: I have reviewed all the pertinent labs, microbiology data and radiology studies during assessment.     Recent Labs      04/22/18 0413 04/21/18 0413 04/20/18 0430 04/19/18 2036   NA  136  134*  133*  134*   K  3.1*  3.5  3.5  3.3*   CL  102  101  99  99   CO2  25  24  24  25   GLU  195*  213*  110*  123*   BUN  42*  40*  36*  32*   CREA  1.53*  1.54*  1.78*  1.53*   CA  10.3*  9.4  8.7  8.2*   MG   --   2.1   --    --    PHOS   --   3.6   --    --    ALB   --   3.1*   --   2.2*   SGOT   --   28   --   56*   ALT   --   25   --   29     Recent Labs      04/22/18 0413 04/21/18 0413 04/20/18 0430 04/19/18 2036   WBC  18.4*  12.6*  13.7*  13.3*   HGB  8.5*  7.3*  7.5*  7.6*   HCT  26.4*  22.8*  23.6*  23.8*   PLT  379  259  233  235     No results found for: SDES  Lab Results   Component Value Date/Time    Culture result: GRAM NEGATIVE RODS (A) 04/20/2018 04:30 AM    Culture result: NO GROWTH 3 DAYS 04/19/2018 08:36 PM    Culture result: NO GROWTH 3 DAYS 04/19/2018 08:36 PM    Culture result: ESCHERICHIA COLI (PREDOMINATING) (A) 04/19/2018 12:05 PM    Culture result: PSEUDOMONAS SPECIES (1,000 ORGANISMS PER ML) (A) 04/19/2018 12:05 PM     Recent Results (from the past 24 hour(s))   GLUCOSE, POC    Collection Time: 04/21/18 11:30 AM   Result Value Ref Range    Glucose (POC) 205 (H) 65 - 100 mg/dL    Performed by Cristino Dominguez 62., POC    Collection Time: 04/21/18  5:10 PM   Result Value Ref Range    Glucose (POC) 239 (H) 65 - 100 mg/dL    Performed by Cristino Amador., POC    Collection Time: 04/21/18  9:26 PM   Result Value Ref Range    Glucose (POC) 227 (H) 65 - 100 mg/dL    Performed by [employer]    METABOLIC PANEL, BASIC    Collection Time: 04/22/18  4:13 AM   Result Value Ref Range    Sodium 136 136 - 145 mmol/L    Potassium 3.1 (L) 3.5 - 5.1 mmol/L    Chloride 102 97 - 108 mmol/L    CO2 25 21 - 32 mmol/L    Anion gap 9 5 - 15 mmol/L    Glucose 195 (H) 65 - 100 mg/dL    BUN 42 (H) 6 - 20 MG/DL    Creatinine 1.53 (H) 0.55 - 1.02 MG/DL    BUN/Creatinine ratio 27 (H) 12 - 20      GFR est AA 39 (L) >60 ml/min/1.73m2    GFR est non-AA 33 (L) >60 ml/min/1.73m2    Calcium 10.3 (H) 8.5 - 10.1 MG/DL   CBC W/O DIFF    Collection Time: 04/22/18  4:13 AM   Result Value Ref Range    WBC 18.4 (H) 3.6 - 11.0 K/uL    RBC 2.99 (L) 3.80 - 5.20 M/uL    HGB 8.5 (L) 11.5 - 16.0 g/dL    HCT 26.4 (L) 35.0 - 47.0 %    MCV 88.3 80.0 - 99.0 FL    MCH 28.4 26.0 - 34.0 PG    MCHC 32.2 30.0 - 36.5 g/dL    RDW 22.6 (H) 11.5 - 14.5 %    PLATELET 387 105 - 798 K/uL    MPV 10.2 8.9 - 12.9 FL    NRBC 0.0 0  WBC    ABSOLUTE NRBC 0.00 0.00 - 0.01 K/uL   GLUCOSE, POC    Collection Time: 04/22/18  4:31 AM   Result Value Ref Range    Glucose (POC) 204 (H) 65 - 100 mg/dL    Performed by Fabio Arriola    TROPONIN I    Collection Time: 04/22/18  5:06 AM   Result Value Ref Range    Troponin-I, Qt. 1.48 (H) <0.05 ng/mL   CK W/ CKMB & INDEX    Collection Time: 04/22/18  5:06 AM   Result Value Ref Range    CK 42 26 - 192 U/L    CK - MB <1.0 <3.6 NG/ML    CK-MB Index Cannot be calculated 0 - 2.5     GLUCOSE, POC Collection Time: 04/22/18  6:33 AM   Result Value Ref Range    Glucose (POC) 290 (H) 65 - 100 mg/dL    Performed by Stew Mcclure    PTT    Collection Time: 04/22/18  6:39 AM   Result Value Ref Range    aPTT 32.1 (H) 22.1 - 32.0 sec    aPTT, therapeutic range     58.0 - 77.0 SECS   D DIMER    Collection Time: 04/22/18  7:35 AM   Result Value Ref Range    D-dimer 13.33 (H) 0.00 - 0.65 mg/L FEU   TROPONIN I    Collection Time: 04/22/18  7:35 AM   Result Value Ref Range    Troponin-I, Qt. 1.50 (H) <0.05 ng/mL   CK W/ CKMB & INDEX    Collection Time: 04/22/18  7:35 AM   Result Value Ref Range    CK 41 26 - 192 U/L    CK - MB 1.2 <3.6 NG/ML    CK-MB Index 2.9 (H) 0 - 2.5     POC G3 - PUL    Collection Time: 04/22/18  7:56 AM   Result Value Ref Range    FIO2 (POC) 0.24 %    pH (POC) 7.242 (LL) 7.35 - 7.45      pCO2 (POC) 56.4 (H) 35.0 - 45.0 MMHG    pO2 (POC) 75 (L) 80 - 100 MMHG    HCO3 (POC) 24.3 22 - 26 MMOL/L    sO2 (POC) 92 92 - 97 %    Base deficit (POC) 3 mmol/L    Site RIGHT RADIAL      Device: NASAL CANNULA      Flow rate (POC) 2 L/M    Allens test (POC) YES      Specimen type (POC) ARTERIAL      Total resp. rate 24         I have reviewed the flowsheets. Chart and Pertinent Notes have been reviewed. No change in PMH ,family and social history from Consult note.       Chaparro Baird MD

## 2018-04-22 NOTE — PROGRESS NOTES
Cardiology Progress Note            Admit Date: 4/16/2018  Admit Diagnosis: SOB (shortness of breath)  Date: 4/22/2018     Time: 2:01 PM    HPI: 82 y.o. Female with new diagnosis of CHF. Presented with chief c/o of SOB, found to have pulmonary edema and EF 40% on TTE. Overnight with acute pulmonary edema with restlessness, diaphoresis, nausea, dyspnea. She is having an acute hypertensive crisis. Sons at bedside are very concerned. Labs show troponin still coming down as expected from prior MI, CK negative. Pt denies chest pain but is uncomfortable. Audible wheezing/rhonchi. Discussed situation with family and treating acutely as follows:  Ekg neg for acute MI, ntg gtt and lasix IV already, redose lasix, titrate up ntg drip, add labetalol drip for bp control. Morphine low dose for air hunger, repeat enzymes. Heparin drip for ACS, will stop if no evidence of new MI for concern of bleeding into mets, anemia, etc.   WBC elevating, recheck ucx, blood cx. Still on Zosyn. Family concern for AMS x 1-2 days. Multiple possibilities- fever, dyspnea, pain, icu psychosis, brain mets. Just had brain MRI 3/18. Assessment and Plan     1. NSTEMI:  Troponin trending down from 18.9     -no chest pain   -TTE:  EF 40%, severe hypokinesis of apical walls, mod hypk AS wall Mod LVH, Mild-mod MR, mild TR   -Continue ASA, coreg, plavix   -Statin intolerant due to weakness on zocor, on repatha, last LDL 31, so will not rechallenge statin   -Plan for [facility] on hold with mets    2. Cardiomyopathy/Acute HFrEF, new:  EF 40% NYHA IV on admission, now   -acute decompensation this am with htn urgency, add labetalol and losartan, ntg gtt    3. JEROD on CKD:   creatinine 1.5   -Baseline creat 0.9-1 mg/dl   -Renal US with nonobstructing L kidney stone. And a hepatic lesion   -Nephrology following    4. Anemia: hgb 7-8 now   -management per renal/primary team    5. Malignant HTN- titrate meds as above    6. Fever:  Better but pt diaphoretic, had hypotension fri nite, will reculture, etc    7. Elevated LFTs:  Better, dec. 8. Hx of Colon cancer, ?breast ca, cone, liver, brain mets    BP stable, restart bblocker. Look at palliative options. Subjective:   Sandy Ledesma denies chest pain, but has nausea, restlessness, malaise. Short of breath. Objective:      Physical Exam:                Visit Vitals    BP (!) 203/110 (BP Patient Position: At rest)    Pulse (!) 120    Temp 97.6 °F (36.4 °C)    Resp 23    Ht 5' 1\" (1.549 m)    Wt 132 lb 7.9 oz (60.1 kg)    SpO2 100%    BMI 25.03 kg/m2          General Appearance:   Sickly looking, elderly female, restless, sweaty   Ears/Nose/Mouth/Throat:    Hearing grossly normal.         Neck:  Supple. Chest:     Coarse rhonchi throughout   Cardiovascular:   Tachy,regular rate and rhythm, S1, S2 normal, no murmur. Abdomen:    Soft, non-tender, bowel sounds are active. Extremities:  No edema bilaterally. Skin:  Warm and dry.      Telemetry: NSR          Data Review:    Labs:    Recent Results (from the past 24 hour(s))   GLUCOSE, POC    Collection Time: 04/21/18  8:24 AM   Result Value Ref Range    Glucose (POC) 215 (H) 65 - 100 mg/dL    Performed by [address]ina Dominguez [address]., POC    Collection Time: 04/21/18 11:30 AM   Result Value Ref Range    Glucose (POC) 205 (H) 65 - 100 mg/dL    Performed by Galapagoskatalina Dominguez [address] POC    Collection Time: 04/21/18  5:10 PM   Result Value Ref Range    Glucose (POC) 239 (H) 65 - 100 mg/dL    Performed by K[address] Angelica [address] POC    Collection Time: 04/21/18  9:26 PM   Result Value Ref Range    Glucose (POC) 227 (H) 65 - 100 mg/dL    Performed by Oliver Hillrich    METABOLIC PANEL, BASIC    Collection Time: 04/22/18  4:13 AM   Result Value Ref Range    Sodium 136 136 - 145 mmol/L    Potassium 3.1 (L) 3.5 - 5.1 mmol/L    Chloride 102 97 - 108 mmol/L CO2 25 21 - 32 mmol/L    Anion gap 9 5 - 15 mmol/L    Glucose 195 (H) 65 - 100 mg/dL    BUN 42 (H) 6 - 20 MG/DL    Creatinine 1.53 (H) 0.55 - 1.02 MG/DL    BUN/Creatinine ratio 27 (H) 12 - 20      GFR est AA 39 (L) >60 ml/min/1.73m2    GFR est non-AA 33 (L) >60 ml/min/1.73m2    Calcium 10.3 (H) 8.5 - 10.1 MG/DL   CBC W/O DIFF    Collection Time: 04/22/18  4:13 AM   Result Value Ref Range    WBC 18.4 (H) 3.6 - 11.0 K/uL    RBC 2.99 (L) 3.80 - 5.20 M/uL    HGB 8.5 (L) 11.5 - 16.0 g/dL    HCT 26.4 (L) 35.0 - 47.0 %    MCV 88.3 80.0 - 99.0 FL    MCH 28.4 26.0 - 34.0 PG    MCHC 32.2 30.0 - 36.5 g/dL    RDW 22.6 (H) 11.5 - 14.5 %    PLATELET 721 625 - 865 K/uL    MPV 10.2 8.9 - 12.9 FL    NRBC 0.0 0  WBC    ABSOLUTE NRBC 0.00 0.00 - 0.01 K/uL   GLUCOSE, POC    Collection Time: 04/22/18  4:31 AM   Result Value Ref Range    Glucose (POC) 204 (H) 65 - 100 mg/dL    Performed by Amina Painter    TROPONIN I    Collection Time: 04/22/18  5:06 AM   Result Value Ref Range    Troponin-I, Qt. 1.48 (H) <0.05 ng/mL   CK W/ CKMB & INDEX    Collection Time: 04/22/18  5:06 AM   Result Value Ref Range    CK 42 26 - 192 U/L    CK - MB <1.0 <3.6 NG/ML    CK-MB Index Cannot be calculated 0 - 2.5     GLUCOSE, POC    Collection Time: 04/22/18  6:33 AM   Result Value Ref Range    Glucose (POC) 290 (H) 65 - 100 mg/dL    Performed by Blas Chen           Radiology:        Current Facility-Administered Medications   Medication Dose Route Frequency    albuterol-ipratropium (DUO-NEB) 2.5 MG-0.5 MG/3 ML  3 mL Nebulization Q6H PRN    nitroglycerin (NITROSTAT) 0.4 mg tablet        metoprolol (LOPRESSOR) 5 mg/5 mL injection        nitroglycerin (Tridil) 200 mcg/ml infusion  0-20 mcg/min IntraVENous TITRATE    morphine injection 2 mg  2 mg IntraVENous ONCE    labetalol (NORMODYNE;TRANDATE) 300 mg in 0.9% sodium chloride 150 mL (2 mg / 1 mL) infusion  0.5-2 mg/min IntraVENous TITRATE    morphine 4 mg/mL injection        LORazepam (ATIVAN) tablet 0.5 mg  0.5 mg Oral Q6H PRN    carvedilol (COREG) tablet 3.125 mg  3.125 mg Oral BID WITH MEALS    famotidine (PF) (PEPCID) 20 mg in sodium chloride 0.9% 10 mL injection  20 mg IntraVENous DAILY    hydrocortisone Sod Succ (PF) (SOLU-CORTEF) injection 100 mg  100 mg IntraVENous Q8H    piperacillin-tazobactam (ZOSYN) 3.375 g in 0.9% sodium chloride (MBP/ADV) 100 mL  3.375 g IntraVENous Q8H    NOREPINephrine (LEVOPHED) 8 mg in 5% dextrose 250mL infusion  2-30 mcg/min IntraVENous TITRATE    PHENYLephrine (PF)(RICARDO-SYNEPHRINE) 30 mg in 0.9% sodium chloride 250 mL infusion   mcg/min IntraVENous TITRATE    glucose chewable tablet 16 g  4 Tab Oral PRN    dextrose (D50W) injection syrg 12.5-25 g  12.5-25 g IntraVENous PRN    glucagon (GLUCAGEN) injection 1 mg  1 mg IntraMUSCular PRN    insulin lispro (HUMALOG) injection   SubCUTAneous AC&HS    prochlorperazine (COMPAZINE) with saline injection 5 mg  5 mg IntraVENous Q8H PRN    ascorbic acid (vitamin C) (VITAMIN C) tablet 500 mg  500 mg Oral DAILY    aspirin delayed-release tablet 81 mg  81 mg Oral DAILY    cholecalciferol (VITAMIN D3) tablet 1,000 Units  1,000 Units Oral DAILY    clopidogrel (PLAVIX) tablet 75 mg  75 mg Oral DAILY    folic acid (FOLVITE) tablet 1 mg  1 mg Oral DAILY    glimepiride (AMARYL) tablet 1 mg  1 mg Oral 7am    magnesium oxide (MAG-OX) tablet 400 mg  400 mg Oral DAILY    therapeutic multivitamin (THERAGRAN) tablet 1 Tab  1 Tab Oral DAILY    fish oil-omega-3 fatty acids 340-1,000 mg capsule 1 Cap  1 Cap Oral DAILY    SITagliptin (JANUVIA) tablet tab 50 mg  50 mg Oral DAILY    sodium chloride (NS) flush 5-10 mL  5-10 mL IntraVENous Q8H    sodium chloride (NS) flush 5-10 mL  5-10 mL IntraVENous PRN    acetaminophen (TYLENOL) tablet 650 mg  650 mg Oral Q4H PRN    ondansetron (ZOFRAN) injection 4 mg  4 mg IntraVENous Q4H PRN    levothyroxine (SYNTHROID) tablet 88 mcg  88 mcg Oral ACB     Pt critically ill, poor prognosis, multiple severe issues. CC time 45 min.       Zacarias Schulte MD     [facility] [address] [place] [address] 13, [address] [facility] [address]   Aidan [place]   [phone]

## 2018-04-22 NOTE — PROGRESS NOTES
[facility]  Pulmonary, Critical Care, and Sleep Medicine  Name: Ministerio Linares MRN: [id number]   : 1935 Hospital: [facility]    Date: 2018          Impression Plan   1. Metastatic breast cancer- new dx- left breast mass on MRI with diffuse skeletal mets. Follows with Dr. Masoud Weaver  2. Acute hypercarbic/hypoxic resp failure- cardiogenic pulm edema. 3. NSTEMI - ECHO EF 40%  4. JEROD on CKD  5. GERD  6. E Coli UTI- sens pip/tazo 1. S/p diuresis overnight  2. Start NIV  3. Renal following  4. Continue pip/tazo  5. onc planning on starting Femra  6. D/w pts son. He does not want CPR for his mother but does want intubation if required. I answered all of his questions to the best of my abilities and to his apparent satisfaction. The son is in agreement with the plan as outlined. Pt is acutely ill . Pt is clinically unstable . At risk for decline due to resp failure    Medical Decision Making Today  · I have reviewed the flowsheet and previous days notes  · One or more chronic illnesses with severe exacerbation, progression or side effects of treatment  · Acute or chronic illness that poses a threat to life or bodily function  · Abrupt change in neurologic status  · Review and order of Clinical lab tests  · Review and Order of Radiology tests  · Review and Order of Medicine tests  · Discuss case with Specialist MD  · Independent visualization of Image  · Review and summarize records or history fromprevious days notes  · Reviewed Ventilator / NiPPV  · I have personally reviewed the patients ECG / Tele       Radiology  ( personally reviewed) PCXR with edema pattern( worse)   ABG Recent Labs      18   0756   PHI  7.242*   PO2I  75*   PCO2I  56.4*          Subjective/Interval History:   I have reviewed the flowsheet and previous days notes. Review of systems not obtained due to patient factors. Somnolent, does not follow commands.        Objective:     Mode Rate Tidal Volume Pressure FiO2 PEEP            40 %       Vital Signs:     TMAX(24)      Intake/Output:   Last shift:      Ventilator Volumes  Vt Spont (ml): 431 ml  Ve Observed (l/min): 5.7 l/min  Last 3 shifts: 04/22 0701 - 04/22 1900  In: 203.1 [I.V.:203.1]  Out: 150 [Urine:150]RRIOLAST3  Intake/Output Summary (Last 24 hours) at 04/22/18 1051  Last data filed at 04/22/18 1000   Gross per 24 hour   Intake          1261.85 ml   Output             2249 ml   Net          -987.15 ml     EXAM:   HEENT:  PERRL, EOMI, no alar flaring or epistaxis, oral mucosa moist without cyanosis, NECK:  no jugular vein distention, no retractions, no thyromegaly or masses, LUNGS: decreased breath sounds billaterally and with rhonchi , HEART:  Regular rate and rhythm with no MGR; no edema is present, ABDOMEN:  soft with no tenderness, bowel sounds present, EXTREMITIES:  warm with no cyanosis and SKIN:  no jaundice or ecchymosis        Data    I have personally reviewed data, flowsheets for the last 24 hours.         Labs:  Recent Labs      04/22/18   0413  04/21/18   0413  04/20/18   0430   WBC  18.4*  12.6*  13.7*   HGB  8.5*  7.3*  7.5*   HCT  26.4*  22.8*  23.6*   PLT  379  259  233     Recent Labs      04/22/18   0413  04/21/18   0413  04/20/18   0430  04/19/18 2036   NA  136  134*  133*  134*   K  3.1*  3.5  3.5  3.3*   CL  102  101  99  99   CO2  25  24  24  25   GLU  195*  213*  110*  123*   BUN  42*  40*  36*  32*   CREA  1.53*  1.54*  1.78*  1.53*   CA  10.3*  9.4  8.7  8.2*   MG   --   2.1   --    --    PHOS   --   3.6   --    --    ALB   --   3.1*   --   2.2*   SGOT   --   28   --   56*   ALT   --   25   --   Alison Mcguire MD  [facility]

## 2018-04-22 NOTE — PROGRESS NOTES
Hematology-Oncology Progress Note    Samira Orozco  1935  [id number]  4/22/2018    Follow-up for: abnormal brain MRI     [x]        Chart notes since last visit reviewed   [x]        Medications reviewed for allergies and interactions       Case discussed with the following:         []                            [x]        Nursing Staff                                                                         []        Pathologist                                                                        [x]        FAMILY      Subjective:     Spoke with patient who complains of: pt had a difficult night, had sob, agitation, doing a little better this am    Objective:     Patient Vitals for the past 24 hrs:   BP Temp Pulse Resp SpO2 Weight   04/22/18 0945 - - - - 100 % -   04/22/18 0800 147/79 96.6 °F (35.9 °C) 87 19 95 % -   04/22/18 0700 (!) 175/95 - (!) 109 18 95 % -   04/22/18 0600 (!) 180/91 - (!) 102 25 99 % -   04/22/18 0500 (!) 203/110 - (!) 120 23 100 % -   04/22/18 0400 (!) 182/94 97.6 °F (36.4 °C) (!) 110 21 96 % -   04/22/18 0300 (!) 188/102 - (!) 107 20 100 % -   04/22/18 0251 - - - - 93 % -   04/22/18 0200 (!) 171/91 - 97 22 97 % -   04/22/18 0100 (!) 186/94 - 98 20 99 % -   04/22/18 0000 - 97.6 °F (36.4 °C) 95 17 97 % 60.1 kg (132 lb 7.9 oz)   04/21/18 2300 162/69 - 84 16 99 % -   04/21/18 2200 154/63 - 82 17 98 % -   04/21/18 2100 157/70 - 84 21 98 % -   04/21/18 2000 150/69 97.9 °F (36.6 °C) 89 20 98 % -   04/21/18 1908 155/60 - 92 20 96 % -   04/21/18 1807 - - 82 24 98 % -   04/21/18 1800 146/60 - - - 97 % -   04/21/18 1700 (!) 141/96 - 81 20 95 % -   04/21/18 1600 135/55 97.8 °F (36.6 °C) 78 18 97 % -   04/21/18 1500 137/90 - 83 16 95 % -   04/21/18 1400 148/70 - 83 16 96 % -   04/21/18 1300 132/53 - 75 15 96 % -   04/21/18 1200 153/70 97.8 °F (36.6 °C) 84 19 98 % -   04/21/18 1100 144/64 - 91 22 97 % -       REVIEW OF SYSTEMS:    Constitutional: negative fever, negative chills, negative weight loss  Eyes:   negative visual changes  ENT:   negative sore throat, tongue or lip swelling  Respiratory:  negative cough, negative dyspnea  Cards:  negative for chest pain, palpitations, lower extremity edema  GI:   negative for nausea, vomiting, diarrhea, and abdominal pain  Neuro:  negative for headaches, dizziness, vertigo  []                        Full ROS o/w normal/non contributor    Constitutional:  Patient looks  []        Sick  [x]        Frail  []        Better                                                 []        Depressed    HEENT:  [x]   NC                         []   AT               []    ALOPECIA           Eyes: [x]   Normal               []    Icteric  Oropharynx: []    Normal                  []  Thrush               []   Dry  Mucositis: []    None                 Grade: []        I  []        II  []        III  []        IV  Neck:   [x]   Supple                  []  Rigid      Breast.. Left upper outer quadrant mass            Lungs bilateral ronchi  Cor tachy  Abd.  Soft non tender  Skin:  Intact [x]           Purpura []        Rash: []   ABSENT       []  PRESENT  Neuro:  [x]        Normal  []        Confused      Available labs reviewed:  Labs:    Recent Results (from the past 24 hour(s))   GLUCOSE, POC    Collection Time: 04/21/18 11:30 AM   Result Value Ref Range    Glucose (POC) 205 (H) 65 - 100 mg/dL    Performed by Cristino Dominguez 62., POC    Collection Time: 04/21/18  5:10 PM   Result Value Ref Range    Glucose (POC) 239 (H) 65 - 100 mg/dL    Performed by Cristino Dominguez 62., POC    Collection Time: 04/21/18  9:26 PM   Result Value Ref Range    Glucose (POC) 227 (H) 65 - 100 mg/dL    Performed by Holly Pike    METABOLIC PANEL, BASIC    Collection Time: 04/22/18  4:13 AM   Result Value Ref Range    Sodium 136 136 - 145 mmol/L    Potassium 3.1 (L) 3.5 - 5.1 mmol/L    Chloride 102 97 - 108 mmol/L    CO2 25 21 - 32 mmol/L    Anion gap 9 5 - 15 mmol/L    Glucose 195 (H) 65 - 100 mg/dL    BUN 42 (H) 6 - 20 MG/DL    Creatinine 1.53 (H) 0.55 - 1.02 MG/DL    BUN/Creatinine ratio 27 (H) 12 - 20      GFR est AA 39 (L) >60 ml/min/1.73m2    GFR est non-AA 33 (L) >60 ml/min/1.73m2    Calcium 10.3 (H) 8.5 - 10.1 MG/DL   CBC W/O DIFF    Collection Time: 04/22/18  4:13 AM   Result Value Ref Range    WBC 18.4 (H) 3.6 - 11.0 K/uL    RBC 2.99 (L) 3.80 - 5.20 M/uL    HGB 8.5 (L) 11.5 - 16.0 g/dL    HCT 26.4 (L) 35.0 - 47.0 %    MCV 88.3 80.0 - 99.0 FL    MCH 28.4 26.0 - 34.0 PG    MCHC 32.2 30.0 - 36.5 g/dL    RDW 22.6 (H) 11.5 - 14.5 %    PLATELET 686 269 - 569 K/uL    MPV 10.2 8.9 - 12.9 FL    NRBC 0.0 0  WBC    ABSOLUTE NRBC 0.00 0.00 - 0.01 K/uL   GLUCOSE, POC    Collection Time: 04/22/18  4:31 AM   Result Value Ref Range    Glucose (POC) 204 (H) 65 - 100 mg/dL    Performed by Modesto Gao    TROPONIN I    Collection Time: 04/22/18  5:06 AM   Result Value Ref Range    Troponin-I, Qt. 1.48 (H) <0.05 ng/mL   CK W/ CKMB & INDEX    Collection Time: 04/22/18  5:06 AM   Result Value Ref Range    CK 42 26 - 192 U/L    CK - MB <1.0 <3.6 NG/ML    CK-MB Index Cannot be calculated 0 - 2.5     GLUCOSE, POC    Collection Time: 04/22/18  6:33 AM   Result Value Ref Range    Glucose (POC) 290 (H) 65 - 100 mg/dL    Performed by Pipe Quan    PTT    Collection Time: 04/22/18  6:39 AM   Result Value Ref Range    aPTT 32.1 (H) 22.1 - 32.0 sec    aPTT, therapeutic range     58.0 - 77.0 SECS   D DIMER    Collection Time: 04/22/18  7:35 AM   Result Value Ref Range    D-dimer 13.33 (H) 0.00 - 0.65 mg/L FEU   TROPONIN I    Collection Time: 04/22/18  7:35 AM   Result Value Ref Range    Troponin-I, Qt. 1.50 (H) <0.05 ng/mL   CK W/ CKMB & INDEX    Collection Time: 04/22/18  7:35 AM   Result Value Ref Range    CK 41 26 - 192 U/L    CK - MB 1.2 <3.6 NG/ML    CK-MB Index 2.9 (H) 0 - 2.5     POC G3 - PUL    Collection Time: 04/22/18  7:56 AM   Result Value Ref Range    FIO2 (POC) 0.24 %    pH (POC) 7.242 (LL) 7.35 - 7.45      pCO2 (POC) 56.4 (H) 35.0 - 45.0 MMHG    pO2 (POC) 75 (L) 80 - 100 MMHG    HCO3 (POC) 24.3 22 - 26 MMOL/L    sO2 (POC) 92 92 - 97 %    Base deficit (POC) 3 mmol/L    Site RIGHT RADIAL      Device: NASAL CANNULA      Flow rate (POC) 2 L/M    Allens test (POC) YES      Specimen type (POC) ARTERIAL      Total resp. rate 24         Available Xrays reviewed:    Chemotherapy monitored and toxicities assessed:    Assessment and Plan   1. Probable metastatic breast cancer. The bone scan shows diffuse mets, the [id number] anc cea are elevated and the pt has a palpable left breast mass,,, She needs a biopsy, would consult Vivienne Jaime breast surgeon on Monday for core bx so we can get hormonal recepter data,,, suspect the is ER + given the duration of mass in left breast and hx of breast bx at [facility] several years ago,   If so she could very well respond nicely to femara +/- ibrance. .  I had a long talk with his son today and rather than wait a week to begin femara we will begin that today. .. 2. Anemia. .. Pt has bone mets, suspect this is due to chronic disease +- marrow involvement    Abel Fountain MD      3. CHF. Gae Dewayne Per cardiology. . They are considering doing cardiac cath    4.    Code status,,, d/w son today I think they are leaning towards DNR, but want every thing done other galvan  Abel Fountain MD

## 2018-04-22 NOTE — ROUTINE PROCESS
1930. Bedside, Verbal and Written shift change report given to Milind Ly RN (oncoming nurse) by Monica Barron RN (offgoing nurse). Report included the following information SBAR, Kardex, ED Summary, Intake/Output, MAR, Accordion, Recent Results, Med Rec Status, Cardiac Rhythm ST and Alarm Parameters .      0730. Bedside, Verbal and Written shift change report given to ARIANA Keyes (oncoming nurse) by Milind Ly RN (offgoing nurse).  Report included the following information SBAR, Kardex, ED Summary, OR Summary, Procedure Summary, Intake/Output, MAR, Accordion, Recent Results, Med Rec Status, Cardiac Rhythm SR/ST and Alarm Parameters .

## 2018-04-22 NOTE — PROGRESS NOTES
2000. Assumed care of pt; no complaints; SBP on higher side but MDs aware    0000. No changes    0130. Pt found to be ripping everything off; HR and BP elevated; she believes she is at home and is trying to get up; reoriented and repositioned. [id number]. Pt found with legs out the bed; bed alarm placed and pt instructed not to get OOB; HR up  (-609) and expiratory wheezing and crackles in bases noted; hospitalist paged and spoke with [facility]; pt info given; new orders received for Lasix 40 mg IV x1 dose and PRN Duo Nebs; RT notified and treatment given    0310. HR-104 02 sats-100% on RA RR-22 BP-188/102; pt reports breathing better; no wheezing noted; crackles sounds lessened    0415. Pt requesting something to eat; crackers and blueberries given      [id number]. Pt rang out for various reasons while eating; one time pt reported to PCT feeling \"weird\"; patient examined  and assessment/VS unchanged, answers orientation questions appropriately; denies any pain, nausea, she believes her BG is low-check; POC BG-204; pt does appear slightly anxious with some exp wheezing noted again and pt sounds a slightly \"wet\"; therefore placed on 3L NC for comfort while I spoke with son    0445. Pt called son via cell phone stating \"My blood sugar is low\"; son then called unit very upset; son reassured BG is not low as we have checked it and it is 46; son is addiment pt is having another MI and states her current behavior and complaints are exactly like before when she had a NSTEMI; pt is on 3L NC and denies any pain including chest pain; her only complaint is \"I feel weak\";hospitalist called-spoke with [facility],updated on pt condition and sons concerns for MI; new given orders received for CXR, EKG, cardiac enzymes    0500. Pt is eating crackers and watching TV;     [id number]. EKG done; similar to EKG done 2/20; CCU RNs reviewed and decided a code STEMI will not be called d/t no significant changes. 9333.  pt now c/o \"some\" discomfort in the chest when sons arrive to bedside; Nitrostat given bc pt now says \"sort of\" when asked if chest pain present    0600. Spoke with Dr. Volodymyr Parmar; pt info given; new orders received to give one time lopressor 5 mg IVP; start Heparin and nitroglycerin infusion and that advised she would be by shortly    0615. Heparin and Nitroglycerin infusions started    0618. Dr. Volodymyr Parmar paged for lasix as pt sounds wet and sons concerned and requesting more lasix be given; pt denies any chest pain    0624. Radiologist called and asked to read CXR STAT; pt remains in 3L NC sats-97% RR-26 HR-109 BP-185/89    0497-6063. Dr. Volodymyr Parmar at the bedside; updated on pt; she spoke with patient's sons; multiple orders received    0715. Lab work collected and sent to lab    0065. Ativan given d/t restlessness    0800. Pt having hallucinations and not answering all orientation ?s appropriately like before; remains restless and pulling at things; ABG ordered STAT and RT notified    [phone]. ABG done; oncoming RN notified    0830.  Report given to oncoming RN

## 2018-04-23 ENCOUNTER — TELEPHONE (OUTPATIENT)
Dept: RHEUMATOLOGY | Age: 83
End: 2018-04-23

## 2018-04-23 ENCOUNTER — APPOINTMENT (OUTPATIENT)
Dept: MAMMOGRAPHY | Age: 83
DRG: 853 | End: 2018-04-23
Attending: SURGERY
Payer: MEDICARE

## 2018-04-23 PROBLEM — R41.82 ALTERED MENTAL STATUS, UNSPECIFIED: Status: ACTIVE | Noted: 2018-04-23

## 2018-04-23 LAB
ALBUMIN SERPL ELPH-MCNC: 2.7 G/DL (ref 2.9–4.4)
ALBUMIN/GLOB SERPL: 0.7 {RATIO} (ref 0.7–1.7)
ALPHA1 GLOB SERPL ELPH-MCNC: 0.5 G/DL (ref 0–0.4)
ALPHA2 GLOB SERPL ELPH-MCNC: 0.8 G/DL (ref 0.4–1)
ANION GAP SERPL CALC-SCNC: 11 MMOL/L (ref 5–15)
ATRIAL RATE: 80 BPM
B-GLOBULIN SERPL ELPH-MCNC: 0.9 G/DL (ref 0.7–1.3)
BACTERIA SPEC CULT: NORMAL
BACTERIA SPEC CULT: NORMAL
BASOPHILS # BLD: 0 K/UL (ref 0–0.1)
BASOPHILS NFR BLD: 0 % (ref 0–1)
BUN SERPL-MCNC: 48 MG/DL (ref 6–20)
BUN/CREAT SERPL: 28 (ref 12–20)
CALCIUM SERPL-MCNC: 9.9 MG/DL (ref 8.5–10.1)
CALCULATED P AXIS, ECG09: 54 DEGREES
CALCULATED R AXIS, ECG10: 70 DEGREES
CALCULATED T AXIS, ECG11: 134 DEGREES
CC UR VC: NORMAL
CHLORIDE SERPL-SCNC: 104 MMOL/L (ref 97–108)
CO2 SERPL-SCNC: 26 MMOL/L (ref 21–32)
CREAT SERPL-MCNC: 1.74 MG/DL (ref 0.55–1.02)
DIAGNOSIS, 93000: NORMAL
DIFFERENTIAL METHOD BLD: ABNORMAL
EOSINOPHIL # BLD: 0 K/UL (ref 0–0.4)
EOSINOPHIL NFR BLD: 0 % (ref 0–7)
ERYTHROCYTE [DISTWIDTH] IN BLOOD BY AUTOMATED COUNT: 22.8 % (ref 11.5–14.5)
GAMMA GLOB SERPL ELPH-MCNC: 1.5 G/DL (ref 0.4–1.8)
GLOBULIN SER CALC-MCNC: 3.8 G/DL (ref 2.2–3.9)
GLUCOSE BLD STRIP.AUTO-MCNC: 100 MG/DL (ref 65–100)
GLUCOSE BLD STRIP.AUTO-MCNC: 105 MG/DL (ref 65–100)
GLUCOSE BLD STRIP.AUTO-MCNC: 113 MG/DL (ref 65–100)
GLUCOSE BLD STRIP.AUTO-MCNC: 131 MG/DL (ref 65–100)
GLUCOSE SERPL-MCNC: 107 MG/DL (ref 65–100)
HCT VFR BLD AUTO: 23 % (ref 35–47)
HGB BLD-MCNC: 7.4 G/DL (ref 11.5–16)
IMM GRANULOCYTES # BLD: 0.3 K/UL (ref 0–0.04)
IMM GRANULOCYTES NFR BLD AUTO: 2 % (ref 0–0.5)
KAPPA LC FREE SER-MCNC: 89.5 MG/L (ref 3.3–19.4)
KAPPA LC FREE/LAMBDA FREE SER: 1.31 {RATIO} (ref 0.26–1.65)
LAMBDA LC FREE SERPL-MCNC: 68.5 MG/L (ref 5.7–26.3)
LYMPHOCYTES # BLD: 1.2 K/UL (ref 0.8–3.5)
LYMPHOCYTES NFR BLD: 8 % (ref 12–49)
M PROTEIN SERPL ELPH-MCNC: ABNORMAL G/DL
MAGNESIUM SERPL-MCNC: 1.8 MG/DL (ref 1.6–2.4)
MCH RBC QN AUTO: 28.1 PG (ref 26–34)
MCHC RBC AUTO-ENTMCNC: 32.2 G/DL (ref 30–36.5)
MCV RBC AUTO: 87.5 FL (ref 80–99)
MONOCYTES # BLD: 1.2 K/UL (ref 0–1)
MONOCYTES NFR BLD: 8 % (ref 5–13)
NEUTS SEG # BLD: 12.8 K/UL (ref 1.8–8)
NEUTS SEG NFR BLD: 82 % (ref 32–75)
NRBC # BLD: 0.02 K/UL (ref 0–0.01)
NRBC BLD-RTO: 0.1 PER 100 WBC
P-R INTERVAL, ECG05: 154 MS
PHOSPHATE SERPL-MCNC: 3.7 MG/DL (ref 2.6–4.7)
PLATELET # BLD AUTO: 331 K/UL (ref 150–400)
PMV BLD AUTO: 9.8 FL (ref 8.9–12.9)
POTASSIUM SERPL-SCNC: 2.5 MMOL/L (ref 3.5–5.1)
POTASSIUM SERPL-SCNC: 3.1 MMOL/L (ref 3.5–5.1)
PROT SERPL-MCNC: 6.5 G/DL (ref 6–8.5)
Q-T INTERVAL, ECG07: 374 MS
QRS DURATION, ECG06: 86 MS
QTC CALCULATION (BEZET), ECG08: 431 MS
RBC # BLD AUTO: 2.63 M/UL (ref 3.8–5.2)
RBC MORPH BLD: ABNORMAL
SERVICE CMNT-IMP: ABNORMAL
SERVICE CMNT-IMP: NORMAL
SODIUM SERPL-SCNC: 141 MMOL/L (ref 136–145)
VENTRICULAR RATE, ECG03: 80 BPM
WBC # BLD AUTO: 15.5 K/UL (ref 3.6–11)

## 2018-04-23 PROCEDURE — 86900 BLOOD TYPING SEROLOGIC ABO: CPT | Performed by: INTERNAL MEDICINE

## 2018-04-23 PROCEDURE — 65660000000 HC RM CCU STEPDOWN

## 2018-04-23 PROCEDURE — 94660 CPAP INITIATION&MGMT: CPT

## 2018-04-23 PROCEDURE — 74011250636 HC RX REV CODE- 250/636: Performed by: INTERNAL MEDICINE

## 2018-04-23 PROCEDURE — 36415 COLL VENOUS BLD VENIPUNCTURE: CPT | Performed by: NURSE PRACTITIONER

## 2018-04-23 PROCEDURE — 74011000258 HC RX REV CODE- 258: Performed by: INTERNAL MEDICINE

## 2018-04-23 PROCEDURE — 97530 THERAPEUTIC ACTIVITIES: CPT

## 2018-04-23 PROCEDURE — 74011000250 HC RX REV CODE- 250: Performed by: INTERNAL MEDICINE

## 2018-04-23 PROCEDURE — 83735 ASSAY OF MAGNESIUM: CPT | Performed by: HOSPITALIST

## 2018-04-23 PROCEDURE — 84132 ASSAY OF SERUM POTASSIUM: CPT | Performed by: NURSE PRACTITIONER

## 2018-04-23 PROCEDURE — 74011250636 HC RX REV CODE- 250/636: Performed by: HOSPITALIST

## 2018-04-23 PROCEDURE — 74011250637 HC RX REV CODE- 250/637: Performed by: NURSE PRACTITIONER

## 2018-04-23 PROCEDURE — 85025 COMPLETE CBC W/AUTO DIFF WBC: CPT | Performed by: HOSPITALIST

## 2018-04-23 PROCEDURE — 0HBU3ZX EXCISION OF LEFT BREAST, PERCUTANEOUS APPROACH, DIAGNOSTIC: ICD-10-PCS | Performed by: RADIOLOGY

## 2018-04-23 PROCEDURE — 88305 TISSUE EXAM BY PATHOLOGIST: CPT | Performed by: RADIOLOGY

## 2018-04-23 PROCEDURE — 84100 ASSAY OF PHOSPHORUS: CPT | Performed by: HOSPITALIST

## 2018-04-23 PROCEDURE — 82962 GLUCOSE BLOOD TEST: CPT

## 2018-04-23 PROCEDURE — 74011250637 HC RX REV CODE- 250/637: Performed by: INTERNAL MEDICINE

## 2018-04-23 PROCEDURE — 19083 BX BREAST 1ST LESION US IMAG: CPT

## 2018-04-23 PROCEDURE — 36591 DRAW BLOOD OFF VENOUS DEVICE: CPT

## 2018-04-23 PROCEDURE — 74011250637 HC RX REV CODE- 250/637: Performed by: HOSPITALIST

## 2018-04-23 PROCEDURE — 88360 TUMOR IMMUNOHISTOCHEM/MANUAL: CPT | Performed by: RADIOLOGY

## 2018-04-23 PROCEDURE — 74011000250 HC RX REV CODE- 250: Performed by: RADIOLOGY

## 2018-04-23 PROCEDURE — 86923 COMPATIBILITY TEST ELECTRIC: CPT | Performed by: INTERNAL MEDICINE

## 2018-04-23 PROCEDURE — 80048 BASIC METABOLIC PNL TOTAL CA: CPT | Performed by: HOSPITALIST

## 2018-04-23 PROCEDURE — 74011250636 HC RX REV CODE- 250/636: Performed by: NURSE PRACTITIONER

## 2018-04-23 RX ORDER — CARVEDILOL 6.25 MG/1
6.25 TABLET ORAL 2 TIMES DAILY WITH MEALS
Status: DISCONTINUED | OUTPATIENT
Start: 2018-04-23 | End: 2018-04-24

## 2018-04-23 RX ORDER — FUROSEMIDE 10 MG/ML
40 INJECTION INTRAMUSCULAR; INTRAVENOUS ONCE
Status: COMPLETED | OUTPATIENT
Start: 2018-04-23 | End: 2018-04-24

## 2018-04-23 RX ORDER — SODIUM CHLORIDE 0.9 % (FLUSH) 0.9 %
20 SYRINGE (ML) INJECTION EVERY 24 HOURS
Status: DISCONTINUED | OUTPATIENT
Start: 2018-04-23 | End: 2018-05-11 | Stop reason: HOSPADM

## 2018-04-23 RX ORDER — SODIUM CHLORIDE 0.9 % (FLUSH) 0.9 %
10-40 SYRINGE (ML) INJECTION EVERY 8 HOURS
Status: DISCONTINUED | OUTPATIENT
Start: 2018-04-23 | End: 2018-05-11 | Stop reason: HOSPADM

## 2018-04-23 RX ORDER — ANASTROZOLE 1 MG/1
1 TABLET ORAL DAILY
Status: DISCONTINUED | OUTPATIENT
Start: 2018-04-23 | End: 2018-05-11 | Stop reason: HOSPADM

## 2018-04-23 RX ORDER — CARVEDILOL 3.12 MG/1
3.12 TABLET ORAL ONCE
Status: COMPLETED | OUTPATIENT
Start: 2018-04-23 | End: 2018-04-23

## 2018-04-23 RX ORDER — POTASSIUM CHLORIDE 750 MG/1
20 TABLET, FILM COATED, EXTENDED RELEASE ORAL ONCE
Status: COMPLETED | OUTPATIENT
Start: 2018-04-23 | End: 2018-04-23

## 2018-04-23 RX ORDER — SODIUM CHLORIDE 0.9 % (FLUSH) 0.9 %
10 SYRINGE (ML) INJECTION AS NEEDED
Status: DISCONTINUED | OUTPATIENT
Start: 2018-04-23 | End: 2018-05-11 | Stop reason: HOSPADM

## 2018-04-23 RX ORDER — SODIUM CHLORIDE 0.9 % (FLUSH) 0.9 %
10 SYRINGE (ML) INJECTION EVERY 24 HOURS
Status: DISCONTINUED | OUTPATIENT
Start: 2018-04-23 | End: 2018-05-11 | Stop reason: HOSPADM

## 2018-04-23 RX ORDER — FAMOTIDINE 20 MG/1
20 TABLET, FILM COATED ORAL DAILY
Status: DISCONTINUED | OUTPATIENT
Start: 2018-04-24 | End: 2018-04-25

## 2018-04-23 RX ORDER — LIDOCAINE HYDROCHLORIDE AND EPINEPHRINE 10; 10 MG/ML; UG/ML
10 INJECTION, SOLUTION INFILTRATION; PERINEURAL ONCE
Status: COMPLETED | OUTPATIENT
Start: 2018-04-23 | End: 2018-04-23

## 2018-04-23 RX ORDER — ACETAMINOPHEN 325 MG/1
650 TABLET ORAL
Status: DISCONTINUED | OUTPATIENT
Start: 2018-04-23 | End: 2018-04-26 | Stop reason: SDUPTHER

## 2018-04-23 RX ORDER — POTASSIUM CHLORIDE 14.9 MG/ML
10 INJECTION INTRAVENOUS
Status: COMPLETED | OUTPATIENT
Start: 2018-04-23 | End: 2018-04-28

## 2018-04-23 RX ORDER — SODIUM CHLORIDE 0.9 % (FLUSH) 0.9 %
10-30 SYRINGE (ML) INJECTION AS NEEDED
Status: DISCONTINUED | OUTPATIENT
Start: 2018-04-23 | End: 2018-05-11 | Stop reason: HOSPADM

## 2018-04-23 RX ORDER — POTASSIUM CHLORIDE 750 MG/1
40 TABLET, FILM COATED, EXTENDED RELEASE ORAL ONCE
Status: COMPLETED | OUTPATIENT
Start: 2018-04-23 | End: 2018-04-23

## 2018-04-23 RX ORDER — SODIUM CHLORIDE 9 MG/ML
250 INJECTION, SOLUTION INTRAVENOUS AS NEEDED
Status: DISCONTINUED | OUTPATIENT
Start: 2018-04-23 | End: 2018-05-04

## 2018-04-23 RX ORDER — HYDROCORTISONE SODIUM SUCCINATE 100 MG/2ML
25 INJECTION, POWDER, FOR SOLUTION INTRAMUSCULAR; INTRAVENOUS EVERY 8 HOURS
Status: DISCONTINUED | OUTPATIENT
Start: 2018-04-23 | End: 2018-04-25

## 2018-04-23 RX ORDER — LIDOCAINE HYDROCHLORIDE 10 MG/ML
15 INJECTION INFILTRATION; PERINEURAL
Status: COMPLETED | OUTPATIENT
Start: 2018-04-23 | End: 2018-04-23

## 2018-04-23 RX ADMIN — POTASSIUM CHLORIDE 10 MEQ: 200 INJECTION, SOLUTION INTRAVENOUS at 09:38

## 2018-04-23 RX ADMIN — OXYCODONE HYDROCHLORIDE AND ACETAMINOPHEN 500 MG: 500 TABLET ORAL at 08:07

## 2018-04-23 RX ADMIN — CARVEDILOL 3.12 MG: 3.12 TABLET, FILM COATED ORAL at 08:07

## 2018-04-23 RX ADMIN — POTASSIUM CHLORIDE 20 MEQ: 750 TABLET, EXTENDED RELEASE ORAL at 08:06

## 2018-04-23 RX ADMIN — PIPERACILLIN SODIUM,TAZOBACTAM SODIUM 3.38 G: 3; .375 INJECTION, POWDER, FOR SOLUTION INTRAVENOUS at 08:09

## 2018-04-23 RX ADMIN — Medication 400 MG: at 08:07

## 2018-04-23 RX ADMIN — LIDOCAINE HYDROCHLORIDE 15 ML: 10 INJECTION, SOLUTION INFILTRATION; PERINEURAL at 15:35

## 2018-04-23 RX ADMIN — Medication 10 ML: at 10:01

## 2018-04-23 RX ADMIN — HYDROCORTISONE SODIUM SUCCINATE 50 MG: 100 INJECTION, POWDER, FOR SOLUTION INTRAMUSCULAR; INTRAVENOUS at 00:04

## 2018-04-23 RX ADMIN — EPOETIN ALFA 10000 UNITS: 10000 SOLUTION INTRAVENOUS; SUBCUTANEOUS at 12:08

## 2018-04-23 RX ADMIN — Medication 20 ML: at 10:02

## 2018-04-23 RX ADMIN — Medication 10 ML: at 18:41

## 2018-04-23 RX ADMIN — HYDROCORTISONE SODIUM SUCCINATE 50 MG: 100 INJECTION, POWDER, FOR SOLUTION INTRAMUSCULAR; INTRAVENOUS at 08:05

## 2018-04-23 RX ADMIN — THERA TABS 1 TABLET: TAB at 08:07

## 2018-04-23 RX ADMIN — POTASSIUM CHLORIDE 10 MEQ: 200 INJECTION, SOLUTION INTRAVENOUS at 10:52

## 2018-04-23 RX ADMIN — LOSARTAN POTASSIUM 25 MG: 25 TABLET, FILM COATED ORAL at 08:07

## 2018-04-23 RX ADMIN — Medication 10 ML: at 18:42

## 2018-04-23 RX ADMIN — CARVEDILOL 3.12 MG: 3.12 TABLET, FILM COATED ORAL at 09:37

## 2018-04-23 RX ADMIN — LIDOCAINE HYDROCHLORIDE AND EPINEPHRINE 100 MG: 10; 10 INJECTION, SOLUTION INFILTRATION; PERINEURAL at 15:35

## 2018-04-23 RX ADMIN — CLOPIDOGREL BISULFATE 75 MG: 75 TABLET ORAL at 08:07

## 2018-04-23 RX ADMIN — CARVEDILOL 6.25 MG: 6.25 TABLET, FILM COATED ORAL at 18:40

## 2018-04-23 RX ADMIN — LABETALOL HYDROCHLORIDE 0.5 MG/MIN: 5 INJECTION INTRAVENOUS at 07:06

## 2018-04-23 RX ADMIN — SITAGLIPTIN 50 MG: 25 TABLET, FILM COATED ORAL at 08:06

## 2018-04-23 RX ADMIN — Medication 10 ML: at 22:00

## 2018-04-23 RX ADMIN — ASPIRIN 81 MG: 81 TABLET, COATED ORAL at 08:07

## 2018-04-23 RX ADMIN — POTASSIUM CHLORIDE 40 MEQ: 750 TABLET, FILM COATED, EXTENDED RELEASE ORAL at 18:39

## 2018-04-23 RX ADMIN — ANASTROZOLE 1 MG: 1 TABLET, COATED ORAL at 12:08

## 2018-04-23 RX ADMIN — Medication 10 ML: at 05:27

## 2018-04-23 RX ADMIN — FOLIC ACID 1 MG: 1 TABLET ORAL at 08:07

## 2018-04-23 RX ADMIN — Medication 1 CAPSULE: at 08:07

## 2018-04-23 RX ADMIN — HYDROCORTISONE SODIUM SUCCINATE 25 MG: 100 INJECTION, POWDER, FOR SOLUTION INTRAMUSCULAR; INTRAVENOUS at 18:40

## 2018-04-23 RX ADMIN — POTASSIUM CHLORIDE 10 MEQ: 200 INJECTION, SOLUTION INTRAVENOUS at 08:05

## 2018-04-23 RX ADMIN — NITROGLYCERIN 1 INCH: 20 OINTMENT TOPICAL at 11:42

## 2018-04-23 RX ADMIN — SODIUM CHLORIDE 20 MG: 9 INJECTION INTRAMUSCULAR; INTRAVENOUS; SUBCUTANEOUS at 08:06

## 2018-04-23 RX ADMIN — LEVOTHYROXINE SODIUM 88 MCG: 88 TABLET ORAL at 06:52

## 2018-04-23 RX ADMIN — GLIMEPIRIDE 1 MG: 1 TABLET ORAL at 06:52

## 2018-04-23 RX ADMIN — POTASSIUM CHLORIDE 10 MEQ: 200 INJECTION, SOLUTION INTRAVENOUS at 12:09

## 2018-04-23 RX ADMIN — PIPERACILLIN SODIUM,TAZOBACTAM SODIUM 3.38 G: 3; .375 INJECTION, POWDER, FOR SOLUTION INTRAVENOUS at 00:02

## 2018-04-23 RX ADMIN — VITAMIN D, TAB 1000IU (100/BT) 1000 UNITS: 25 TAB at 08:07

## 2018-04-23 RX ADMIN — PIPERACILLIN SODIUM,TAZOBACTAM SODIUM 3.38 G: 3; .375 INJECTION, POWDER, FOR SOLUTION INTRAVENOUS at 18:39

## 2018-04-23 NOTE — PROGRESS NOTES
Per ICU nurse lasix is to be given 1/2 way through blood transfusion. 1451-blood transfusion is not ready from blood bank at this time. Patient going to [facility] for breast biopsy. 2000-Bedside and Verbal shift change report given to Laverne King RN (oncoming nurse) by Day Hammer RN (offgoing nurse). Report included the following information SBAR, Kardex, Intake/Output, MAR and Recent Results.

## 2018-04-23 NOTE — PROGRESS NOTES
Cardiology Progress Note            Admit Date: 4/16/2018  Admit Diagnosis: SOB (shortness of breath)  Date: 4/23/2018     Time: 2:01 PM    HPI: 82 y.o. Female with new diagnosis of CHF. Presented with chief c/o of SOB, found to have pulmonary edema and EF 40% on TTE. Overnight, pulmonary status improved. Son concerned pt with some confusion. Discussed that it is multifactorial- age, white matter disease, htn METS to brain, etc etc and he just does not seem to understand. Will ask neurology to see in case I am missing something here. Big picture is that she is critically ill. But more stable than yesterday. Pt denies chest pain, SOB. BP still elevated. Assessment and Plan     1. NSTEMI:  Troponin now flat(1.5), trended down from peak of 18.9     -no chest pain. Medical mgmt given other issues. -TTE:  EF 40%, severe hypokinesis of apical walls, mod hypk AS wall Mod LVH, Mild-mod MR, mild TR   -Continue ASA,  Plavix, increase coreg to 6.25 mg BID   -Statin intolerant due to weakness on zocor, on repatha, last LDL 31, so will not rechallenge statin     2. Cardiomyopathy/Acute HFrEF, new:  EF 40% NYHA IV on admission    -continue Losartan.   -Increase coreg to 6.25 mg BID   -Hold diuretic today (received 80 mg IV yesterday a.m.) restart po tomorrow   -Daily I/Os (net -803), weight down 2 lbs but bed weight. 3. JEROD on CKD:   creatinine trended up 1.74 from 1.53   -Baseline creat 0.9-1 mg/dl   -Renal US with nonobstructing L kidney stone. And a hepatic lesion   -Nephrology following   -Hold lasix today    4. Anemia: hgb 7.4 today from 8.5 yesterday   -Off heparin   -management per renal/primary team    5. Malignant HTN- BP improving but still elevated. -Increase coreg as above, continue Losartan   -Add nitropaste q 6 hours prn SBP >150    6. Fever/leukocytosis/ pseudomonal UTI:  Fever Resolved.   WBC 15.5 today from 18.4 yesterday. Antibx per primary team. Repeat cxs 4/22 pending. 7. Elevated LFTs:  Better, decreased. on 4/21     8. Hx of Colon cancer, now suspected metastatic breast ca (bone, liver, brain mets)  -Hematology oncology following. 9.  Hypokalemia:  K=2.5. Repleted with 60 meq KCL. BP better but still elevated. Increase beta blocker, add prn nitropaste. Now off labetolol and nitro gtt. No chest pain. Will hold lasix for today given increased creatinine and received 80 mg lasix yesterday a.m. Son concerned about pt's confusion. Will order neurology consult. Recommend Look at palliative options. Subjective:   Sandy Ledesma denies chest pain, SOB. Feels better. Objective:      Physical Exam:                Visit Vitals    /62    Pulse 81    Temp 98.1 °F (36.7 °C)    Resp 29    Ht 5' 1\" (1.549 m)    Wt 130 lb 11.7 oz (59.3 kg)    SpO2 98%    BMI 24.7 kg/m2          General Appearance:   , elderly female, NAD   Ears/Nose/Mouth/Throat:    Hearing grossly normal.         Neck:  Supple. Chest:     Left basilar crackles, RLL diminished. Respirations labored. Cardiovascular:   Regular rate and rhythm, S1, S2 normal, no murmur. Abdomen:    Soft, non-tender, bowel sounds are active. Extremities:  No edema bilaterally. Skin:  Warm and dry.      Telemetry: NSR          Data Review:    Labs:    Recent Results (from the past 24 hour(s))   GLUCOSE, POC    Collection Time: 04/22/18 11:27 AM   Result Value Ref Range    Glucose (POC) 278 (H) 65 - 100 mg/dL    Performed by [id number], POC    Collection Time: 04/22/18  5:54 PM   Result Value Ref Range    Glucose (POC) 260 (H) 65 - 100 mg/dL    Performed by [address], POC    Collection Time: 04/22/18  9:35 PM   Result Value Ref Range    Glucose (POC) 215 (H) 65 - 100 mg/dL    Performed by Tim Schmidt BASIC    Collection Time: 04/23/18  4:32 AM   Result Value Ref Range    Sodium 141 136 - 145 mmol/L    Potassium 2.5 (LL) 3.5 - 5.1 mmol/L    Chloride 104 97 - 108 mmol/L    CO2 26 21 - 32 mmol/L    Anion gap 11 5 - 15 mmol/L    Glucose 107 (H) 65 - 100 mg/dL    BUN 48 (H) 6 - 20 MG/DL    Creatinine 1.74 (H) 0.55 - 1.02 MG/DL    BUN/Creatinine ratio 28 (H) 12 - 20      GFR est AA 34 (L) >60 ml/min/1.73m2    GFR est non-AA 28 (L) >60 ml/min/1.73m2    Calcium 9.9 8.5 - 10.1 MG/DL   CBC WITH AUTOMATED DIFF    Collection Time: 04/23/18  4:32 AM   Result Value Ref Range    WBC 15.5 (H) 3.6 - 11.0 K/uL    RBC 2.63 (L) 3.80 - 5.20 M/uL    HGB 7.4 (L) 11.5 - 16.0 g/dL    HCT 23.0 (L) 35.0 - 47.0 %    MCV 87.5 80.0 - 99.0 FL    MCH 28.1 26.0 - 34.0 PG    MCHC 32.2 30.0 - 36.5 g/dL    RDW 22.8 (H) 11.5 - 14.5 %    PLATELET 429 521 - 549 K/uL    MPV 9.8 8.9 - 12.9 FL    NRBC 0.1 (H) 0  WBC    ABSOLUTE NRBC 0.02 (H) 0.00 - 0.01 K/uL    NEUTROPHILS 82 (H) 32 - 75 %    LYMPHOCYTES 8 (L) 12 - 49 %    MONOCYTES 8 5 - 13 %    EOSINOPHILS 0 0 - 7 %    BASOPHILS 0 0 - 1 %    IMMATURE GRANULOCYTES 2 (H) 0.0 - 0.5 %    ABS. NEUTROPHILS 12.8 (H) 1.8 - 8.0 K/UL    ABS. LYMPHOCYTES 1.2 0.8 - 3.5 K/UL    ABS. MONOCYTES 1.2 (H) 0.0 - 1.0 K/UL    ABS. EOSINOPHILS 0.0 0.0 - 0.4 K/UL    ABS. BASOPHILS 0.0 0.0 - 0.1 K/UL    ABS. IMM.  GRANS. 0.3 (H) 0.00 - 0.04 K/UL    DF AUTOMATED      RBC COMMENTS ANISOCYTOSIS  2+        RBC COMMENTS TARGET CELLS  PRESENT        RBC COMMENTS OVALOCYTES  PRESENT       MAGNESIUM    Collection Time: 04/23/18  4:32 AM   Result Value Ref Range    Magnesium 1.8 1.6 - 2.4 mg/dL   PHOSPHORUS    Collection Time: 04/23/18  4:32 AM   Result Value Ref Range    Phosphorus 3.7 2.6 - 4.7 MG/DL   GLUCOSE, POC    Collection Time: 04/23/18  6:54 AM   Result Value Ref Range    Glucose (POC) 100 65 - 100 mg/dL    Performed by [facility] [place]           Radiology:        Current Facility-Administered Medications   Medication Dose Route Frequency    potassium chloride 10 mEq in 50 ml IVPB  10 mEq IntraVENous Q1H    carvedilol (COREG) tablet 6.25 mg  6.25 mg Oral BID WITH MEALS    nitroglycerin (NITROBID) 2 % ointment 1 Inch  1 Inch Topical Q6H PRN    hydrocortisone Sod Succ (PF) (SOLU-CORTEF) injection 25 mg  25 mg IntraVENous Q8H    sodium chloride (NS) flush 10-30 mL  10-30 mL InterCATHeter PRN    sodium chloride (NS) flush 10 mL  10 mL InterCATHeter Q24H    sodium chloride (NS) flush 10 mL  10 mL InterCATHeter PRN    sodium chloride (NS) flush 10-40 mL  10-40 mL InterCATHeter Q8H    sodium chloride (NS) flush 20 mL  20 mL InterCATHeter Q24H    alteplase (CATHFLO) 1 mg in sterile water (preservative free) 1 mL injection  1 mg InterCATHeter PRN    albuterol-ipratropium (DUO-NEB) 2.5 MG-0.5 MG/3 ML  3 mL Nebulization Q6H PRN    LORazepam (ATIVAN) tablet 0.5 mg  0.5 mg Oral Q6H PRN    losartan (COZAAR) tablet 25 mg  25 mg Oral DAILY    famotidine (PF) (PEPCID) 20 mg in sodium chloride 0.9% 10 mL injection  20 mg IntraVENous DAILY    piperacillin-tazobactam (ZOSYN) 3.375 g in 0.9% sodium chloride (MBP/ADV) 100 mL  3.375 g IntraVENous Q8H    glucose chewable tablet 16 g  4 Tab Oral PRN    dextrose (D50W) injection syrg 12.5-25 g  12.5-25 g IntraVENous PRN    glucagon (GLUCAGEN) injection 1 mg  1 mg IntraMUSCular PRN    insulin lispro (HUMALOG) injection   SubCUTAneous AC&HS    prochlorperazine (COMPAZINE) with saline injection 5 mg  5 mg IntraVENous Q8H PRN    ascorbic acid (vitamin C) (VITAMIN C) tablet 500 mg  500 mg Oral DAILY    aspirin delayed-release tablet 81 mg  81 mg Oral DAILY    cholecalciferol (VITAMIN D3) tablet 1,000 Units  1,000 Units Oral DAILY    clopidogrel (PLAVIX) tablet 75 mg  75 mg Oral DAILY    folic acid (FOLVITE) tablet 1 mg  1 mg Oral DAILY    glimepiride (AMARYL) tablet 1 mg  1 mg Oral 7am    magnesium oxide (MAG-OX) tablet 400 mg  400 mg Oral DAILY    therapeutic multivitamin (THERAGRAN) tablet 1 Tab  1 Tab Oral DAILY    fish oil-omega-3 fatty acids 340-1,000 mg capsule 1 Cap  1 Cap Oral DAILY    SITagliptin (JANUVIA) tablet tab 50 mg  50 mg Oral DAILY    sodium chloride (NS) flush 5-10 mL  5-10 mL IntraVENous Q8H    sodium chloride (NS) flush 5-10 mL  5-10 mL IntraVENous PRN    acetaminophen (TYLENOL) tablet 650 mg  650 mg Oral Q4H PRN    ondansetron (ZOFRAN) injection 4 mg  4 mg IntraVENous Q4H PRN    levothyroxine (SYNTHROID) tablet 88 mcg  88 mcg Oral ACB     Pt critically ill, poor prognosis, multiple severe issues.        Emmett Capone MD     [facility] [address] [place] [address] 13, [address] [facility] [address]   [place]   [phone]

## 2018-04-23 NOTE — DIABETES MGMT
DTC Progress Note    Recommendations/ Comments: Chart reviewed due to hyperglycemia likely due to steroids. Noted steroids are being weaned and blood sugars are likely to improve. DTC to continue to follow. Current hospital DM medication: Januvia 50 mg/d,  Amaryl 1mg/d and correction scale Humalog, normal sensitivity. Chart reviewed on Sandy Ledesma. Patient is a 82 y.o. female with known Type 2 Diabetes on Januvia 50 mg/d; Amaryl 1 mg/d; Metformin 1000 mg BID at home. A1c:   Lab Results   Component Value Date/Time    Hemoglobin A1c 7.0 (H) 04/18/2018 08:14 AM    Hemoglobin A1c 6.9 (H) 11/30/2011 08:30 AM       Recent Glucose Results:   Lab Results   Component Value Date/Time     (H) 04/23/2018 04:32 AM    GLUCPOC 105 (H) 04/23/2018 11:25 AM    GLUCPOC 100 04/23/2018 06:54 AM    GLUCPOC 215 (H) 04/22/2018 09:35 PM        Lab Results   Component Value Date/Time    Creatinine 1.74 (H) 04/23/2018 04:32 AM     Estimated Creatinine Clearance: 21 mL/min (based on Cr of 1.74). Active Orders   Diet    DIET CARDIAC Regular; Consistent Carb 1800kcal        PO intake:   Patient Vitals for the past 72 hrs:   % Diet Eaten   04/22/18 1800 50 %   04/20/18 2000 75 %       Will continue to follow as needed.     Thank you  Juventino Crane RD, CDE

## 2018-04-23 NOTE — PROGRESS NOTES
0730: Bedside and Verbal shift change report given to Stephy RN (oncoming nurse) by Brigid RN (offgoing nurse). Report included the following information SBAR, Kardex, Intake/Output, MAR, Recent Results and Cardiac Rhythm NSR.      1230: TRANSFER - OUT REPORT:    Verbal report given to Jennifer Rn(name) on Krissy Age  being transferred to [facility]) for routine progression of care       Report consisted of patients Situation, Background, Assessment and   Recommendations(SBAR). Information from the following report(s) SBAR, Kardex, Intake/Output, MAR, Recent Results and Cardiac Rhythm NSR was reviewed with the receiving nurse. Lines:   Peripheral IV 04/23/18 Right Antecubital (Active)   Site Assessment Clean, dry, & intact 4/23/2018 12:55 PM   Phlebitis Assessment 0 4/23/2018 12:00 PM   Infiltration Assessment 0 4/23/2018 12:00 PM   Dressing Status Clean, dry, & intact 4/23/2018 12:00 PM   Dressing Type Transparent;Tape 4/23/2018 12:00 PM   Hub Color/Line Status Pink;Flushed; Infusing 4/23/2018 12:00 PM   Action Taken Open ports on tubing capped 4/23/2018 12:00 PM   Alcohol Cap Used Yes 4/23/2018 12:00 PM       Peripheral IV 04/23/18 Right Forearm (Active)   Site Assessment Clean, dry, & intact 4/23/2018 12:55 PM   Phlebitis Assessment 0 4/23/2018 12:00 PM   Infiltration Assessment 0 4/23/2018 12:00 PM   Dressing Status Clean, dry, & intact 4/23/2018 12:00 PM   Dressing Type Transparent;Tape 4/23/2018 12:00 PM   Hub Color/Line Status Blue;Flushed;End cap changed 4/23/2018 12:00 PM   Action Taken Open ports on tubing capped 4/23/2018 12:00 PM   Alcohol Cap Used Yes 4/23/2018 12:00 PM        Opportunity for questions and clarification was provided.       Patient transported with:   Monitor  Registered Nurse  Tech

## 2018-04-23 NOTE — PROGRESS NOTES
Hematology-Oncology Progress Note    Trudy Haywood  1935  [id number]  4/23/2018    Follow-up for: abnormal brain MRI     [x]        Chart notes since last visit reviewed   [x]        Medications reviewed for allergies and interactions       Case discussed with the following:         []                            [x]        Nursing Staff                                                                         []        Pathologist                                                                        [x]        FAMILY      Subjective:     Spoke with patient who complains of: pt had a better night, is comfortable, cheerful this am    Objective:     Patient Vitals for the past 24 hrs:   BP Temp Pulse Resp SpO2 Weight   04/23/18 0800 177/62 98.1 °F (36.7 °C) 81 29 98 % -   04/23/18 0700 175/66 - 78 19 99 % -   04/23/18 0600 166/77 - 99 30 98 % -   04/23/18 0500 156/73 - 93 (!) 33 97 % -   04/23/18 0400 155/62 98.8 °F (37.1 °C) 87 (!) 34 97 % 59.3 kg (130 lb 11.7 oz)   04/23/18 0300 (!) 135/99 - 84 20 95 % -   04/23/18 0200 142/58 - 88 16 98 % -   04/23/18 0146 - - - - 100 % -   04/23/18 0100 134/54 - 69 17 100 % -   04/23/18 0000 142/57 98 °F (36.7 °C) 70 19 100 % -   04/22/18 2300 125/52 - 70 16 100 % -   04/22/18 2200 156/71 - 83 19 100 % -   04/22/18 2100 147/71 - 78 (!) 31 99 % -   04/22/18 2000 142/68 97.7 °F (36.5 °C) 83 22 99 % -   04/22/18 1900 139/56 - 81 (!) 31 97 % -   04/22/18 1800 144/62 - 85 18 100 % -   04/22/18 1700 149/63 - 74 22 100 % -   04/22/18 1600 125/56 98.3 °F (36.8 °C) 74 28 100 % -   04/22/18 1500 141/61 - 77 13 100 % -   04/22/18 1400 132/57 - 74 11 100 % -   04/22/18 1300 115/53 - 69 13 100 % -   04/22/18 1211 - - - - 100 % -   04/22/18 1200 109/53 97.9 °F (36.6 °C) 68 13 100 % -   04/22/18 1100 97/47 - 67 14 100 % -       REVIEW OF SYSTEMS:    Constitutional: negative fever, negative chills, negative weight loss  Eyes:   negative visual changes  ENT:   negative sore throat, tongue or lip swelling  Respiratory:  negative cough, negative dyspnea  Cards:  negative for chest pain, palpitations, lower extremity edema  GI:   negative for nausea, vomiting, diarrhea, and abdominal pain  Neuro:  negative for headaches, dizziness, vertigo  []                        Full ROS o/w normal/non contributor    Constitutional:  Patient looks  []        Sick  [x]        Frail  []        Better                                                 []        Depressed    HEENT:  [x]   NC                         []   AT               []    ALOPECIA           Eyes: [x]   Normal               []    Icteric  Oropharynx: []    Normal                  []  Thrush               []   Dry  Mucositis: []    None                 Grade: []        I  []        II  []        III  []        IV  Neck:   [x]   Supple                  []  Rigid      Breast.. Left upper outer quadrant mass            Lungs clear anteriorly  Cor tachy  Abd.  Soft non tender  Ext, no edema  Skin:  Intact [x]           Purpura []        Rash: [x]   ABSENT       []  PRESENT  Neuro:  [x]        Normal  []        Confused      Available labs reviewed:  Labs:    Recent Results (from the past 24 hour(s))   GLUCOSE, POC    Collection Time: 04/22/18 11:27 AM   Result Value Ref Range    Glucose (POC) 278 (H) 65 - 100 mg/dL    Performed by [address], POC    Collection Time: 04/22/18  5:54 PM   Result Value Ref Range    Glucose (POC) 260 (H) 65 - 100 mg/dL    Performed by [address], POC    Collection Time: 04/22/18  9:35 PM   Result Value Ref Range    Glucose (POC) 215 (H) 65 - 100 mg/dL    Performed by [facility]    METABOLIC PANEL, BASIC    Collection Time: 04/23/18  4:32 AM   Result Value Ref Range    Sodium 141 136 - 145 mmol/L    Potassium 2.5 (LL) 3.5 - 5.1 mmol/L    Chloride 104 97 - 108 mmol/L    CO2 26 21 - 32 mmol/L    Anion gap 11 5 - 15 mmol/L    Glucose 107 (H) 65 - 100 mg/dL    BUN 48 (H) 6 - 20 MG/DL Creatinine 1.74 (H) 0.55 - 1.02 MG/DL    BUN/Creatinine ratio 28 (H) 12 - 20      GFR est AA 34 (L) >60 ml/min/1.73m2    GFR est non-AA 28 (L) >60 ml/min/1.73m2    Calcium 9.9 8.5 - 10.1 MG/DL   CBC WITH AUTOMATED DIFF    Collection Time: 04/23/18  4:32 AM   Result Value Ref Range    WBC 15.5 (H) 3.6 - 11.0 K/uL    RBC 2.63 (L) 3.80 - 5.20 M/uL    HGB 7.4 (L) 11.5 - 16.0 g/dL    HCT 23.0 (L) 35.0 - 47.0 %    MCV 87.5 80.0 - 99.0 FL    MCH 28.1 26.0 - 34.0 PG    MCHC 32.2 30.0 - 36.5 g/dL    RDW 22.8 (H) 11.5 - 14.5 %    PLATELET 120 666 - 449 K/uL    MPV 9.8 8.9 - 12.9 FL    NRBC 0.1 (H) 0  WBC    ABSOLUTE NRBC 0.02 (H) 0.00 - 0.01 K/uL    NEUTROPHILS 82 (H) 32 - 75 %    LYMPHOCYTES 8 (L) 12 - 49 %    MONOCYTES 8 5 - 13 %    EOSINOPHILS 0 0 - 7 %    BASOPHILS 0 0 - 1 %    IMMATURE GRANULOCYTES 2 (H) 0.0 - 0.5 %    ABS. NEUTROPHILS 12.8 (H) 1.8 - 8.0 K/UL    ABS. LYMPHOCYTES 1.2 0.8 - 3.5 K/UL    ABS. MONOCYTES 1.2 (H) 0.0 - 1.0 K/UL    ABS. EOSINOPHILS 0.0 0.0 - 0.4 K/UL    ABS. BASOPHILS 0.0 0.0 - 0.1 K/UL    ABS. IMM. GRANS. 0.3 (H) 0.00 - 0.04 K/UL    DF AUTOMATED      RBC COMMENTS ANISOCYTOSIS  2+        RBC COMMENTS TARGET CELLS  PRESENT        RBC COMMENTS OVALOCYTES  PRESENT       MAGNESIUM    Collection Time: 04/23/18  4:32 AM   Result Value Ref Range    Magnesium 1.8 1.6 - 2.4 mg/dL   PHOSPHORUS    Collection Time: 04/23/18  4:32 AM   Result Value Ref Range    Phosphorus 3.7 2.6 - 4.7 MG/DL   GLUCOSE, POC    Collection Time: 04/23/18  6:54 AM   Result Value Ref Range    Glucose (POC) 100 65 - 100 mg/dL    Performed by Abigail Ochoa        Available Xrays reviewed:    Chemotherapy monitored and toxicities assessed:    Assessment and Plan   1. Probable metastatic breast cancer.  The bone scan shows diffuse mets, the [id number] anc cea are elevated and the pt has a palpable left breast mass,,, She needs a biopsy, will consult Anayeli Flanagan breast surgeon today, for core bx so we can get hormonal recepter data,,, suspect the is ER + given the duration of mass in left breast and hx of breast bx at [facility] several years ago,   If so she could very well respond nicely to Sheyla +/- ibrance. .  I had a long talk with his son today and rather than wait a week to begin femara we will begin that today. .(femara not on formulary, will give arimidex). 2. Anemia. .. Pt has bone mets, suspect this is due to chronic disease +- marrow involvement, will start procrit, will give one unit prbc today for anemia in setting of chf/cad      3. CHF. Evelia Loose Per cardiology. .     4.   Code status,,, d/w son today they dont want CPR but do want supportive care up to and including intubation if necessary    Rosanne Oconnell MD

## 2018-04-23 NOTE — PROGRESS NOTES
Problem: Mobility Impaired (Adult and Pediatric)  Goal: *Acute Goals and Plan of Care (Insert Text)  Physical Therapy Goals  Initiated 4/20/2018  1. Patient will move from supine to sit and sit to supine , scoot up and down and roll side to side in bed with minimal assistance/contact guard assist within 7 day(s). 2.  Patient will transfer from bed to chair and chair to bed with minimal assistance/contact guard assist using the least restrictive device within 7 day(s). 3.  Patient will perform sit to stand with minimal assistance/contact guard assist within 7 day(s). 4.  Patient will ambulate with minimal assistance/contact guard assist for 25 feet with the least restrictive device within 7 day(s). 5.  Patient will ascend/descend 3 stairs with 2 handrail(s) with minimal assistance/contact guard assist within 7 day(s). physical Therapy TREATMENT  Patient: Ludivina Cee (78 y.o. female)  Date: 4/23/2018  Diagnosis: SOB (shortness of breath) SOB (shortness of breath)       Precautions: Fall  Chart, physical therapy assessment, plan of care and goals were reviewed. ASSESSMENT:  Patient cleared by RN for continued mobility with PT. Patient alert, awake, and agreeable to participate. Family present. Encouraged family to allow patient to do things on her own to improve independence. Able to progress to standing this date and side stepping to chair with overall min A from bed/chair position. RW used for B UE support. Recommending HHPT vs rehab pending patient progress and level of assist at home. .   Progression toward goals:  [x]    Improving appropriately and progressing toward goals  []    Improving slowly and progressing toward goals  []    Not making progress toward goals and plan of care will be adjusted     PLAN:  Patient continues to benefit from skilled intervention to address the above impairments. Continue treatment per established plan of care.   Discharge Recommendations:  Home Health  Further Equipment Recommendations for Discharge:  tbd     SUBJECTIVE:   Patient stated Im the mommy here.     OBJECTIVE DATA SUMMARY:   Critical Behavior:  Neurologic State: Alert, Eyes open spontaneously  Orientation Level: Oriented X4  Cognition: Decreased attention/concentration, Decreased command following  Safety/Judgement: Awareness of environment, Fall prevention, Good awareness of safety precautions, Home safety, Insight into deficits  Functional Mobility Training:  Bed Mobility:                    Transfers:  Sit to Stand: Minimum assistance  Stand to Sit: Minimum assistance                             Balance:  Sitting: Impaired; Without support  Sitting - Static: Good (unsupported)  Sitting - Dynamic: Fair (occasional)  Standing: Impaired; With support  Standing - Static: Fair;Constant support  Standing - Dynamic : Fair      Pain:  Pain Scale 1: Numeric (0 - 10)  Pain Intensity 1: 0              Activity Tolerance:   VSS  Please refer to the flowsheet for vital signs taken during this treatment.   After treatment:   [x]    Patient left in no apparent distress sitting up in chair  []    Patient left in no apparent distress in bed  [x]    Call bell left within reach  [x]    Nursing notified  [x]    Caregiver present  [x]    Bed alarm activated    COMMUNICATION/COLLABORATION:   The patients plan of care was discussed with: Occupational Therapist and Registered Nurse    Abigail Blanton PT, DPT   Time Calculation: 26 mins

## 2018-04-23 NOTE — PROGRESS NOTES
[facility]  Pulmonary, Critical Care, and Sleep Medicine  Name: Norberto Lozada MRN: [id number]   : 1935 Hospital: [facility]    Date: 2018          Impression Plan   1. Metastatic breast cancer- new dx- left breast mass on MRI with diffuse skeletal mets. Follows with Dr. Rayshawn Estrada  2. Acute hypercarbic/hypoxic resp failure- cardiogenic pulm edema, improving  3. NSTEMI - ECHO EF 40%  4. JEROD on CKD  5. GERD  6. E Coli UTI- sens pip/tazo 1. On RA  2. Hemodynamically stable  3. Renal following  4. Continue pip/tazo  5. onc planning on starting Femra  6. Needs continued diuresis for CHF- cardiology following  7. Labs and CXR in am  8. Wean off steroids  9. Transfer out of ICU  10. We will be available to help again as needed   Pt is acutely ill but improved for transfer out of ICU    Medical Decision Making Today  · I have reviewed the flowsheet and previous days notes  · One or more chronic illnesses with severe exacerbation, progression or side effects of treatment  · Acute or chronic illness that poses a threat to life or bodily function  · Abrupt change in neurologic status  · Review and order of Clinical lab tests  · Review and Order of Radiology tests  · Review and Order of Medicine tests  · Discuss case with Specialist MD  · Independent visualization of Image  · Review and summarize records or history fromprevious days notes  · Reviewed Ventilator / NiPPV  · I have personally reviewed the patients ECG / Tele       Radiology  ( personally reviewed) PCXR with edema pattern( worse)   ABG Recent Labs      18   0756   PHI  7.242*   PO2I  75*   PCO2I  56.4*          Subjective/Interval History:   I have reviewed the flowsheet and previous days notes. Review of systems not obtained due to patient factors. Awake, cheerful, comfortable on RA.  Adequate diuresis      Objective:     Mode Rate Tidal Volume Pressure FiO2 PEEP            40 %       Vital Signs:     TMAX(24) Intake/Output:   Last shift:      Ventilator Volumes  Vt Spont (ml): 457 ml  Ve Observed (l/min): 5.7 l/min  Last 3 shifts: 04/23 0701 - 04/23 1900  In: 250 [I.V.:250]  Out: 375 [Urine:375]RRIOLAST3    Intake/Output Summary (Last 24 hours) at 04/23/18 1333  Last data filed at 04/23/18 1200   Gross per 24 hour   Intake              770 ml   Output             1860 ml   Net            -1090 ml     EXAM:   HEENT:  PERRL, EOMI, no alar flaring or epistaxis, oral mucosa moist without cyanosis, NECK:  no jugular vein distention, no retractions, no thyromegaly or masses, LUNGS: decreased breath sounds billaterally and with rhonchi , HEART:  Regular rate and rhythm with no MGR; no edema is present, ABDOMEN:  soft with no tenderness, bowel sounds present, EXTREMITIES:  warm with no cyanosis and SKIN:  no jaundice or ecchymosis        Data    I have personally reviewed data, flowsheets for the last 24 hours.         Labs:  Recent Labs      04/23/18   0432  04/22/18   0413  04/21/18   0413   WBC  15.5*  18.4*  12.6*   HGB  7.4*  8.5*  7.3*   HCT  23.0*  26.4*  22.8*   PLT  331  379  259     Recent Labs      04/23/18   0432  04/22/18   0413  04/21/18   0413   NA  141  136  134*   K  2.5*  3.1*  3.5   CL  104  102  101   CO2  26  25  24   GLU  107*  195*  213*   BUN  48*  42*  40*   CREA  1.74*  1.53*  1.54*   CA  9.9  10.3*  9.4   MG  1.8   --   2.1   PHOS  3.7   --   3.6   ALB   --    --   3.1*   SGOT   --    --   28   ALT   --    --   Merlene Cabrera, MD  [facility]

## 2018-04-23 NOTE — PROGRESS NOTES
Care manager received a call from Theresa Mims with [employer] who is patient's  and can help with discharge planning. She can be reached at [phone] ext [phone]. Patient now has orders to transfer out of ICU. Care management continuing to follow.  Arline Harris RN,CRM

## 2018-04-23 NOTE — PROGRESS NOTES
Clinical Pharmacy Note: IV to PO Automatic Conversion  Please note: Diane Narayanan medication- famotidine has been changed from IV to PO based on the following critiera:    - Patient is taking scheduled oral medications  - Patient is tolerating tube feeds at goal rate or a full liquid, soft or regular diet    This IV to PO conversion is based on the P&[employer] approved automatic conversion policy for eligible patients. Please call with questions.

## 2018-04-23 NOTE — PROGRESS NOTES
Consent was obtained from the patient's son as the patient has been experiencing some confusion. Patient tolerated left breast biopsy well with minimal bleeding. Biopsy site was bandaged with steri-strips, gauze and tegaderm. The left breast was then wrapped with an ace wrap to hold pressure on the biopsy site in order reduce any bleeding that may occur, as the patient has been on blood thinning agents for some time. Dr. Bal Stanton stated that she would visit the patient in the morning and remove the ace wrap. Dr. Gray Medina spoke with the patient's son after the procedure and the patient was taken back to her room via stretcher by transport. Dorene Montero RN and gave a brief report. Advised RN if they have any concerns, please call us during the day, and they may call the Radiology department after hours. Breast Biopsy Discharge Instructions    PAIN CONTROL     You may have mild discomfort; take 1-2 Tylenol every 4-6 hours as needed.  Do not take aspirin containing products or anti-inflammatory medications (Advil, Aleve, Motrin, Ibuprofen, etc.) for 24 hours.  Wearing a comfortable bra for support may help with discomfort. WOUND CARE      A small amount of bleeding or bruising at the biopsy site is normal. Watch for signs and symptoms of infections: skin warm to touch, yellowish drainage from wound, fever or severe pain.  Place an ice pack over the site hourly, 20-30 at a time for a few hours today.  The clear dressing is water resistant (you may shower, but do not allow the dressing to become saturated). You may remove the dressing in 48 hours. The steri-strips (small pieces of tape covering the incision) will fall off on their own in a few days. If the clear dressing irritates your skin or begins to peel off, you may remove it. Remember, if you remove the clear dressing before 48 hours, you should not get the site wet.     If at any point you have EXCESSIVE BLEEDING (saturating the gauze under the clear dressing) OR pain please call:    Daytime 7:30am-5:00pm: [facility] [phone]    After Hours:    [phone] (ask to speak to a radiologist)              or [facility] [phone]    ACTIVITY     Do not participate in any strenuous activities for 24 hours (exercise, sports, housework, etc.).  You may resume work (light duty only) for the first 24 hours.  No heavy lifting with the arm on the affected side of your body. Alissa Median will receive your pathology results, they should be available by Thursday afternoon.        Carina ZARAGOZA    MD: Asmita Osorio  RN: Kurt Diaz  Radiology Tech: Adan Martinez

## 2018-04-23 NOTE — PROGRESS NOTES
[facility]   [id number] [address] [place] [facility], [facility]  Phone: [phone]   [phone]       Nephrology Progress Note  Trenton Larios     1935     [id number]  Date of Admission : 4/16/2018 04/23/18    CC: Follow up for JEROD        Assessment and Plan   JEROD on CKD   - Initially from Cardiac event/ NSTEMI + hypotension/sepsis  - Cr up slightly  - hold lasix today and monitor  - daily labs    Hypokalemia:  - replete PRN     Labile HTN:  - leading to flash pulm edema  - improving  - wean off labetalol    Sepsis w/ Shock :  - per PCCM     UTI : On abx     CKD Stage III :  - baseline Cr 0.9-1 mg/dl lately   - progressing, Cr around 1.5 to 1.7 here     NSTEMI :  - Echo shows EF 35-45%, mod LVH, Severe Hypokinesis of apex and moderate Hypokinesis of anteroseptal wall  - cath plans per Dr Ellie French      Acute Systolic CHF: 2/2 MI   - avoid  ACE/ARB     Chronic Pontine Infarcts      ? Brain Mets on MRI and Liver met    hx of colon Ca     Anemia :  - per heme/onc     Interval History:  Seen and examined. Off bipap. Had to be placed on labetalol early this AM. BPs stable. Cr up silghtly, stable UOP overnight. Review of Systems: Pertinent items are noted in HPI.     Current Medications:   Current Facility-Administered Medications   Medication Dose Route Frequency    potassium chloride 10 mEq in 50 ml IVPB  10 mEq IntraVENous Q1H    albuterol-ipratropium (DUO-NEB) 2.5 MG-0.5 MG/3 ML  3 mL Nebulization Q6H PRN    nitroglycerin (Tridil) 200 mcg/ml infusion  0-20 mcg/min IntraVENous TITRATE    labetalol (NORMODYNE;TRANDATE) 300 mg in 0.9% sodium chloride 150 mL (2 mg / 1 mL) infusion  0.5-2 mg/min IntraVENous TITRATE    LORazepam (ATIVAN) tablet 0.5 mg  0.5 mg Oral Q6H PRN    losartan (COZAAR) tablet 25 mg  25 mg Oral DAILY    furosemide (LASIX) injection 40 mg  40 mg IntraVENous BID    hydrocortisone Sod Succ (PF) (SOLU-CORTEF) injection 50 mg  50 mg IntraVENous Q8H    carvedilol (COREG) tablet 3.125 mg  3.125 mg Oral BID WITH MEALS    famotidine (PF) (PEPCID) 20 mg in sodium chloride 0.9% 10 mL injection  20 mg IntraVENous DAILY    piperacillin-tazobactam (ZOSYN) 3.375 g in 0.9% sodium chloride (MBP/ADV) 100 mL  3.375 g IntraVENous Q8H    NOREPINephrine (LEVOPHED) 8 mg in 5% dextrose 250mL infusion  2-30 mcg/min IntraVENous TITRATE    PHENYLephrine (PF)(RICARDO-SYNEPHRINE) 30 mg in 0.9% sodium chloride 250 mL infusion   mcg/min IntraVENous TITRATE    glucose chewable tablet 16 g  4 Tab Oral PRN    dextrose (D50W) injection syrg 12.5-25 g  12.5-25 g IntraVENous PRN    glucagon (GLUCAGEN) injection 1 mg  1 mg IntraMUSCular PRN    insulin lispro (HUMALOG) injection   SubCUTAneous AC&HS    prochlorperazine (COMPAZINE) with saline injection 5 mg  5 mg IntraVENous Q8H PRN    ascorbic acid (vitamin C) (VITAMIN C) tablet 500 mg  500 mg Oral DAILY    aspirin delayed-release tablet 81 mg  81 mg Oral DAILY    cholecalciferol (VITAMIN D3) tablet 1,000 Units  1,000 Units Oral DAILY    clopidogrel (PLAVIX) tablet 75 mg  75 mg Oral DAILY    folic acid (FOLVITE) tablet 1 mg  1 mg Oral DAILY    glimepiride (AMARYL) tablet 1 mg  1 mg Oral 7am    magnesium oxide (MAG-OX) tablet 400 mg  400 mg Oral DAILY    therapeutic multivitamin (THERAGRAN) tablet 1 Tab  1 Tab Oral DAILY    fish oil-omega-3 fatty acids 340-1,000 mg capsule 1 Cap  1 Cap Oral DAILY    SITagliptin (JANUVIA) tablet tab 50 mg  50 mg Oral DAILY    sodium chloride (NS) flush 5-10 mL  5-10 mL IntraVENous Q8H    sodium chloride (NS) flush 5-10 mL  5-10 mL IntraVENous PRN    acetaminophen (TYLENOL) tablet 650 mg  650 mg Oral Q4H PRN    ondansetron (ZOFRAN) injection 4 mg  4 mg IntraVENous Q4H PRN    levothyroxine (SYNTHROID) tablet 88 mcg  88 mcg Oral ACB      Allergies   Allergen Reactions    Statins-Hmg-Coa Reductase Inhibitors Other (comments)     Intolerant to statins     Sulfa (Sulfonamide Antibiotics) Other (comments)     syncope       Objective:  Vitals:    Vitals:    04/23/18 0400 04/23/18 0500 04/23/18 0600 04/23/18 0700   BP: 155/62 156/73 166/77 175/66   Pulse: 87 93 99 78   Resp: (!) 34 (!) 33 30 19   Temp: 98.8 °F (37.1 °C)      SpO2: 97% 97% 98% 99%   Weight: 59.3 kg (130 lb 11.7 oz)      Height:         Intake and Output:     04/21 1901 - 04/23 0700  In: 1545.7 [P.O.:370; I.V.:1175.7]  Out: 3304 [IOGWF:1827]    Physical Examination:    General: lethargic  Neck:  Supple, no mass  Resp:  Diminished at bases   CV:  RRR, no murmur  GI:  Soft, NT, + BS  Neurologic:  Non focal     []    High complexity decision making was performed  []    Patient is at high-risk of decompensation with multiple organ involvement    Lab Data Personally Reviewed: I have reviewed all the pertinent labs, microbiology data and radiology studies during assessment.     Recent Labs      04/23/18 0432  04/22/18 0413 04/21/18 0413   NA  141  136  134*   K  2.5*  3.1*  3.5   CL  104  102  101   CO2  26  25  24   GLU  107*  195*  213*   BUN  48*  42*  40*   CREA  1.74*  1.53*  1.54*   CA  9.9  10.3*  9.4   MG  1.8   --   2.1   PHOS  3.7   --   3.6   ALB   --    --   3.1*   SGOT   --    --   28   ALT   --    --   25     Recent Labs      04/23/18   0432  04/22/18 0413  04/21/18 0413   WBC  15.5*  18.4*  12.6*   HGB  7.4*  8.5*  7.3*   HCT  23.0*  26.4*  22.8*   PLT  331  379  259     No results found for: SDES  Lab Results   Component Value Date/Time    Culture result: NO GROWTH AFTER 20 HOURS 04/22/2018 07:35 AM    Culture result: GRAM NEGATIVE RODS (A) 04/20/2018 04:30 AM    Culture result:  04/20/2018 04:30 AM      PLEASE REFER TO [facility] [id number] FOR FURTHER IDENTIFICATION AND SUSCEPTIBILITIES    Culture result: NO GROWTH 4 DAYS 04/19/2018 08:36 PM    Culture result: NO GROWTH 4 DAYS 04/19/2018 08:36 PM     Recent Results (from the past 24 hour(s))   GLUCOSE, POC    Collection Time: 04/22/18 11:27 AM   Result Value Ref Range    Glucose (POC) 278 (H) 65 - 100 mg/dL    Performed by Pebbles Cox    GLUCOSE, POC    Collection Time: 04/22/18  5:54 PM   Result Value Ref Range    Glucose (POC) 260 (H) 65 - 100 mg/dL    Performed by [address], POC    Collection Time: 04/22/18  9:35 PM   Result Value Ref Range    Glucose (POC) 215 (H) 65 - 100 mg/dL    Performed by Golden Plata    METABOLIC PANEL, BASIC    Collection Time: 04/23/18  4:32 AM   Result Value Ref Range    Sodium 141 136 - 145 mmol/L    Potassium 2.5 (LL) 3.5 - 5.1 mmol/L    Chloride 104 97 - 108 mmol/L    CO2 26 21 - 32 mmol/L    Anion gap 11 5 - 15 mmol/L    Glucose 107 (H) 65 - 100 mg/dL    BUN 48 (H) 6 - 20 MG/DL    Creatinine 1.74 (H) 0.55 - 1.02 MG/DL    BUN/Creatinine ratio 28 (H) 12 - 20      GFR est AA 34 (L) >60 ml/min/1.73m2    GFR est non-AA 28 (L) >60 ml/min/1.73m2    Calcium 9.9 8.5 - 10.1 MG/DL   CBC WITH AUTOMATED DIFF    Collection Time: 04/23/18  4:32 AM   Result Value Ref Range    WBC 15.5 (H) 3.6 - 11.0 K/uL    RBC 2.63 (L) 3.80 - 5.20 M/uL    HGB 7.4 (L) 11.5 - 16.0 g/dL    HCT 23.0 (L) 35.0 - 47.0 %    MCV 87.5 80.0 - 99.0 FL    MCH 28.1 26.0 - 34.0 PG    MCHC 32.2 30.0 - 36.5 g/dL    RDW 22.8 (H) 11.5 - 14.5 %    PLATELET 694 641 - 559 K/uL    MPV 9.8 8.9 - 12.9 FL    NRBC 0.1 (H) 0  WBC    ABSOLUTE NRBC 0.02 (H) 0.00 - 0.01 K/uL    NEUTROPHILS 82 (H) 32 - 75 %    LYMPHOCYTES 8 (L) 12 - 49 %    MONOCYTES 8 5 - 13 %    EOSINOPHILS 0 0 - 7 %    BASOPHILS 0 0 - 1 %    IMMATURE GRANULOCYTES 2 (H) 0.0 - 0.5 %    ABS. NEUTROPHILS 12.8 (H) 1.8 - 8.0 K/UL    ABS. LYMPHOCYTES 1.2 0.8 - 3.5 K/UL    ABS. MONOCYTES 1.2 (H) 0.0 - 1.0 K/UL    ABS. EOSINOPHILS 0.0 0.0 - 0.4 K/UL    ABS. BASOPHILS 0.0 0.0 - 0.1 K/UL    ABS. IMM.  GRANS. 0.3 (H) 0.00 - 0.04 K/UL    DF AUTOMATED      RBC COMMENTS ANISOCYTOSIS  2+        RBC COMMENTS TARGET CELLS  PRESENT        RBC COMMENTS OVALOCYTES  PRESENT       MAGNESIUM    Collection Time: 04/23/18  4:32 AM   Result Value Ref Range    Magnesium 1.8 1.6 - 2.4 mg/dL   PHOSPHORUS    Collection Time: 04/23/18  4:32 AM   Result Value Ref Range    Phosphorus 3.7 2.6 - 4.7 MG/DL   GLUCOSE, POC    Collection Time: 04/23/18  6:54 AM   Result Value Ref Range    Glucose (POC) 100 65 - 100 mg/dL    Performed by Rosa Alex I have reviewed the flowsheets. Chart and Pertinent Notes have been reviewed. No change in PMH ,family and social history from Consult note.       Wendy Stanton MD

## 2018-04-23 NOTE — ROUTINE PROCESS
1930. Bedside, Verbal and Written shift change report given to Scott Perez RN (oncoming nurse) by Desire Castillo RN (offgoing nurse). Report included the following information SBAR, Kardex, ED Summary, Intake/Output, MAR, Accordion, Recent Results, Med Rec Status, Cardiac Rhythm ST and Alarm Parameters .       0730. Bedside, Verbal and Written shift change report given to Desire Castillo RN (oncoming nurse) by Scott Perez RN (offgoing nurse).  Report included the following information SBAR, Kardex, ED Summary, OR Summary, Procedure Summary, Intake/Output, MAR, Accordion, Recent Results, Med Rec Status, Cardiac Rhythm SR/ST and Alarm Parameters .

## 2018-04-23 NOTE — CONSULTS
NEUROLOGY  4/23/2018     Consulted by: Willam Philippe MD        Patient ID:  Arthur Hdez  [id number]  82 y.o.  1935    Chief Complaint   Patient presents with    Shortness of Breath   Cc: Confusion    HPI    Giselle Ma is an 82-year-old woman known to me from the neurology clinic. I first evaluated her in February of this year for generalized weakness and referred her to the emergency room for admission. Since that time she had an MRI brain completed in March ordered by ENT to rule out cause for vertigo. 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 She is a history of colon cancer. She also has rheumatoid arthritis followed by rheumatology. She is here back in the hospital admitted on April 16 for worsening shortness of breath. I reviewed the medical record and her hospital course has been notable complicated by sepsis with shock, pulmonary edema, acute CHF, shock liver, NSTEMI. Neurology was consulted for concerns of confusion reported by the son. When I talk with the patient she does admit to feeling a little confused at times. She denies feeling any unusual focal weakness but feels weak in general.      Review of Systems   Respiratory: Positive for shortness of breath. Neurological: Positive for weakness. Confusion   All other systems reviewed and are negative.       Past Medical History:   Diagnosis Date    Anemia 9/2/2009    Colon cancer ([facility] [address] 9/2/2009    surgery/chemo    Colon cancer ([facility] [address] 9/2/2009    DM (diabetes mellitus) ([facility] [address] 9/2/2009    GERD (gastroesophageal reflux disease)     H/O: CVA 9/2/2009    slight l sided weakness    Hypothyroid 9/2/2009    Ill-defined condition     seasonal allergies    Microalbuminuria 9/2/2009    Osteoporosis 9/2/2009    Other and unspecified hyperlipidemia 1/27/2010    Rheumatoid arthritis involving ankle ([facility] [address] 9/28/2016    Rheumatoid arthritis(714.0) 2009    Unspecified essential hypertension 2009    Weakness due to cerebrovascular accident      Family History   Problem Relation Age of Onset    Diabetes Mother     Stroke Mother     Diabetes Sister     Other Sister      fell and hit her head -  of this   [facility] Diabetes Brother     Cancer Father      stomach    Diabetes Sister      Social History     Social History    Marital status:      Spouse name: N/A    Number of children: N/A    Years of education: N/A     Occupational History    Not on file.      Social History Main Topics    Smoking status: Never Smoker    Smokeless tobacco: Never Used    Alcohol use No    Drug use: No    Sexual activity: Not Currently     Partners: Male     Other Topics Concern    Not on file     Social History Narrative     Current Facility-Administered Medications   Medication Dose Route Frequency    potassium chloride 10 mEq in 50 ml IVPB  10 mEq IntraVENous Q1H    carvedilol (COREG) tablet 6.25 mg  6.25 mg Oral BID WITH MEALS    nitroglycerin (NITROBID) 2 % ointment 1 Inch  1 Inch Topical Q6H PRN    hydrocortisone Sod Succ (PF) (SOLU-CORTEF) injection 25 mg  25 mg IntraVENous Q8H    sodium chloride (NS) flush 10-30 mL  10-30 mL InterCATHeter PRN    sodium chloride (NS) flush 10 mL  10 mL InterCATHeter Q24H    sodium chloride (NS) flush 10 mL  10 mL InterCATHeter PRN    sodium chloride (NS) flush 10-40 mL  10-40 mL InterCATHeter Q8H    sodium chloride (NS) flush 20 mL  20 mL InterCATHeter Q24H    alteplase (CATHFLO) 1 mg in sterile water (preservative free) 1 mL injection  1 mg InterCATHeter PRN    anastrozole (ARIMIDEX) tablet 1 mg  1 mg Oral DAILY    epoetin celio (EPOGEN;PROCRIT) injection 40,000 Units  40,000 Units SubCUTAneous Q7D    0.9% sodium chloride infusion 250 mL  250 mL IntraVENous PRN    acetaminophen (TYLENOL) tablet 650 mg  650 mg Oral Q4H PRN    furosemide (LASIX) injection 40 mg  40 mg IntraVENous ONCE    albuterol-ipratropium (DUO-NEB) 2.5 MG-0.5 MG/3 ML  3 mL Nebulization Q6H PRN    LORazepam (ATIVAN) tablet 0.5 mg  0.5 mg Oral Q6H PRN    losartan (COZAAR) tablet 25 mg  25 mg Oral DAILY    famotidine (PF) (PEPCID) 20 mg in sodium chloride 0.9% 10 mL injection  20 mg IntraVENous DAILY    piperacillin-tazobactam (ZOSYN) 3.375 g in 0.9% sodium chloride (MBP/ADV) 100 mL  3.375 g IntraVENous Q8H    glucose chewable tablet 16 g  4 Tab Oral PRN    dextrose (D50W) injection syrg 12.5-25 g  12.5-25 g IntraVENous PRN    glucagon (GLUCAGEN) injection 1 mg  1 mg IntraMUSCular PRN    insulin lispro (HUMALOG) injection   SubCUTAneous AC&HS    prochlorperazine (COMPAZINE) with saline injection 5 mg  5 mg IntraVENous Q8H PRN    ascorbic acid (vitamin C) (VITAMIN C) tablet 500 mg  500 mg Oral DAILY    aspirin delayed-release tablet 81 mg  81 mg Oral DAILY    cholecalciferol (VITAMIN D3) tablet 1,000 Units  1,000 Units Oral DAILY    clopidogrel (PLAVIX) tablet 75 mg  75 mg Oral DAILY    folic acid (FOLVITE) tablet 1 mg  1 mg Oral DAILY    glimepiride (AMARYL) tablet 1 mg  1 mg Oral 7am    magnesium oxide (MAG-OX) tablet 400 mg  400 mg Oral DAILY    therapeutic multivitamin (THERAGRAN) tablet 1 Tab  1 Tab Oral DAILY    fish oil-omega-3 fatty acids 340-1,000 mg capsule 1 Cap  1 Cap Oral DAILY    SITagliptin (JANUVIA) tablet tab 50 mg  50 mg Oral DAILY    sodium chloride (NS) flush 5-10 mL  5-10 mL IntraVENous Q8H    sodium chloride (NS) flush 5-10 mL  5-10 mL IntraVENous PRN    acetaminophen (TYLENOL) tablet 650 mg  650 mg Oral Q4H PRN    ondansetron (ZOFRAN) injection 4 mg  4 mg IntraVENous Q4H PRN    levothyroxine (SYNTHROID) tablet 88 mcg  88 mcg Oral ACB     Allergies   Allergen Reactions    Statins-Hmg-Coa Reductase Inhibitors Other (comments)     Intolerant to statins     Sulfa (Sulfonamide Antibiotics) Other (comments)     syncope       Visit Vitals    /62    Pulse 81    Temp 98.1 °F (36.7 °C)  Resp 29    Ht 5' 1\" (1.549 m)    Wt 59.3 kg (130 lb 11.7 oz)    SpO2 98%    BMI 24.7 kg/m2     Physical Exam   Constitutional: She appears well-developed and well-nourished. Eyes: EOM are normal. Pupils are equal, round, and reactive to light. Cardiovascular: Normal rate. Pulmonary/Chest: Effort normal.   Skin: Skin is warm and dry. Psychiatric: She has a normal mood and affect. Her behavior is normal.   Vitals reviewed. Neurologic Exam     Mental Status        Awake and alert and pleasant. She knows her location but does not know the year or month. She says it is 1923. Cranial Nerves     CN II   Visual fields full to confrontation. CN III, IV, VI   Pupils are equal, round, and reactive to light.   Extraocular motions are normal.     CN VII   Right facial weakness: peripheral       Dysarthric speech     Motor Exam   Muscle bulk: decreased       Globally weak 4/5     Sensory Exam   Light touch normal.     Gait, Coordination, and Reflexes     Gait  Gait: (Deferred due to weakness)    Tremor   Resting tremor: absent           Lab Results  Component Value Date/Time   WBC 15.5 (H) 04/23/2018 04:32 AM   HGB 7.4 (L) 04/23/2018 04:32 AM   HCT 23.0 (L) 04/23/2018 04:32 AM   PLATELET 343 64/45/5726 04:32 AM   MCV 87.5 04/23/2018 04:32 AM     Lab Results  Component Value Date/Time   Hemoglobin A1c 7.0 (H) 04/18/2018 08:14 AM   Hemoglobin A1c 6.9 (H) 11/30/2011 08:30 AM   Hemoglobin A1c 6.7 (H) 07/25/2011 08:44 AM   Glucose 107 (H) 04/23/2018 04:32 AM   Glucose (POC) 100 04/23/2018 06:54 AM   Microalbumin/Creat ratio (mg/g creat) 34 (H) 06/17/2010 08:58 AM   Microalbumin,urine random 1.92 06/17/2010 08:58 AM   LDL, calculated 31.6 04/16/2018 04:21 AM   Creatinine 1.74 (H) 04/23/2018 04:32 AM      Lab Results  Component Value Date/Time   Cholesterol, total 74 04/16/2018 04:21 AM   HDL Cholesterol 25 04/16/2018 04:21 AM   LDL, calculated 31.6 04/16/2018 04:21 AM   Triglyceride 87 04/16/2018 04:21 AM CHOL/HDL Ratio 3.0 04/16/2018 04:21 AM     Lab Results  Component Value Date/Time   ALT (SGPT) 25 04/21/2018 04:13 AM   AST (SGOT) 28 04/21/2018 04:13 AM   Alk. phosphatase 322 (H) 04/21/2018 04:13 AM   Bilirubin, direct <0.1 04/17/2018 02:40 AM   Bilirubin, total 0.7 04/21/2018 04:13 AM   Albumin 3.1 (L) 04/21/2018 04:13 AM   Protein, total 7.2 04/21/2018 04:13 AM   PLATELET 186 26/93/5736 04:32 AM   AFP, Serum, Tumor Marker 2.5 04/19/2018 06:09 PM          CT Results (maximum last 3): Results from [place] encounter on 04/16/18   CT HEAD WO CONT   Narrative Indication:  lethargy     Comparison: CT 2/9/2018    Findings: 5 mm axial images were obtained from the skull base through the  vertex. CT dose reduction was achieved through the use of a standardized protocol  tailored for this examination and automatic exposure control for dose  modulation. The ventricles and cortical sulci are prominent, compatible with age related  volume loss. There is no evidence of intracranial hemorrhage, mass, mass effect,  or acute infarct. There is periventricular white matter disease. No extra-axial  fluid collections are seen. There is a chronic right cerebellar infarct. There  are sinus because focal opacities throughout the paranasal sinuses, with fluid  in the maxillary sinuses. The orbital structures are unremarkable. No osseous  abnormalities are seen. Impression Impression:   1. No evidence of acute infarct or intracranial hemorrhage. 2. Mild periventricular white matter disease is likely secondary to chronic  small vessel ischemic changes. 3. Chronic sinus mucosal disease. Fluid in the maxillary sinuses may indicate  acute sinusitis. Results from [place] encounter on 02/09/18   CT HEAD WO CONT   Narrative EXAM:  CT HEAD WO CONT    INDICATION: Confusion and weakness. COMPARISON: None. TECHNIQUE: Axial noncontrast head CT from foramen magnum to vertex.  Coronal and  sagittal reformatted images were obtained. CT dose reduction was achieved  through use of a standardized protocol tailored for this examination and  automatic exposure control for dose modulation. Adaptive statistical iterative  reconstruction (ASIR) was utilized. FINDINGS:  There is diffuse age-related parenchymal volume loss. The ventricles  and sulci are age-appropriate without hydrocephalus. There is no mass effect or  midline shift. There is no intracranial hemorrhage or extra-axial fluid  collection. Scattered foci of low attenuation in the periventricular white  matter most likely represent age-indeterminate microvascular ischemic changes. The gray-white matter differentiation is maintained. The basal cisterns are  patent. The osseous structures are intact. There is diffuse paranasal sinus  opacification with aerated secretions and fluid levels in the maxillary and  sphenoid sinuses. The mastoid air cells are clear. Impression IMPRESSION:   1. There is no acute intracranial hemorrhage. 2. There is age-indeterminate microvascular ischemic disease in the  periventricular white matter. 3. Diffuse paranasal sinus inflammatory changes including aerated secretions  suggesting acute sinusitis. MRI Results (maximum last 3): Results from [place] encounter on 04/16/18   MRI ABD W WO CONT   Narrative MRI ABDOMEN AND MRCP WITH AND WITHOUT CONTRAST. INDICATION: Liver lesion on previous imaging    COMPARISON: None. TECHNIQUE: Multisequence and multiplanar MRI of the abdomen was performed after  the administration of 10 mL of MultiHance gadolinium IV contrast. Images were  obtained without contrast; and in late arterial, portal venous, and delayed  phases after the administration of contrast. Subtraction images were  reconstructed.     Heavily T2-weighted thick slab and thin slice images were obtained in the  oblique coronal plane through the biliary tree (MRCP). FINDINGS:     MRCP: No pancreatic or biliary ductal dilation. No stricture or  choledocholithiasis. LIVER: Suboptimal postcontrast imaging secondary to motion. 2 subcentimeter T2  hyperintense foci are noted in the right lobe, most likely representing tiny  cysts. There are multiple suspicious lesions seen on the diffusion weighted  sequences in the right and left hepatic lobes. The largest of these measures  approximately 16mm on series 13 image 51. Many of these have subtle associated  T2 hyperintense signal. Metastatic disease is suspected. GALLBLADDER: Contracted. No gallstones. PANCREAS: Normal.  SPLEEN: Normal.  ADRENALS: Normal.  KIDNEYS: Normal.  DISTAL ESOPHAGUS: Normal.  STOMACH AND DUODENUM: Normal.  VISUALIZED SMALL BOWEL AND COLON: Normal.  PERITONEUM: No free fluid and no abdominal lymphadenopathy. VISUALIZED LUNG BASES: Small bilateral pleural effusions are noted with  associated atelectasis/consolidation in both lower lobes. BONES: Widespread osseous metastases are noted. CHEST WALL: There is a spiculated mass in the central aspect of the left breast  measuring 3.5 x 3.2 x 2.8 cm. A smaller discrete mass measuring 0.9 x 1.3 cm  seen medially in the left breast. Multiple other areas of concerning areas of  enhancement are noted in the left breast, not well assessed on the current  study. Impression IMPRESSION:   1. Large, irregular spiculated mass in the left breast. Adjacent smaller mass  also noted, as well as multiple other areas of suspicious enhancement, which are  not well assessed on the current study. Findings are highly suggestive of a  primary breast malignancy. 2. Suboptimal postcontrast imaging secondary to motion. Diffusion-weighted  imaging demonstrates multiple lesions in the liver. Metastatic disease is  suspected. Widespread osseous metastases are also noted.  Although these findings  could be related to patient's known colon cancer, metastatic breast cancer is  more likely. 3. Small bilateral pleural effusions with associated collapse/consolidation in  the lower lobes. Results from [facility] encounter on 03/19/18   MRI BRAIN W WO CONT   Narrative EXAM: MRI BRAIN W WO CONT    TECHNIQUE: Sagittal and axial T1-weighted images axial FLAIR, T2-weighted,  diffusion weighted, precontrast,  Axial thin section cranial nerve cisternal  images, thin section axial and coronal T1-weighted and fat-suppressed  postcontrast T1-weighted images of the skull base, axial postcontrast images of  the head      IV CONTRAST:  11 cc ProHance    CLINICAL INFORMATION:  Sensorineural hearing loss, bilateral, Benign paroxysmal  vertigo, left ear    COMPARISON:  CT head of 2/9/2018    FINDINGS:  EXTRA-AXIAL SPACES: Prominent cortical sulci, consistent with cerebral volume  loss. Prominence of the sylvian fissures and basal cisterns. INTRACRANIAL HEMORRHAGE: None. VENTRICULAR SYSTEM:  Normal in size and morphology for the patient's age. BASAL CISTERNS:  Normal.  CEREBRAL PARENCHYMA:  Extensive scattered and confluent foci of FLAIR/T2  hyperintensity in the cerebral white matter, most likely due to intracranial  small vessel disease. No enhancing lesions. No evidence of diffusion  restriction. MIDLINE SHIFT: None. CEREBELLUM:  Inferior vermian hypoplasia. Mild volume loss. BRAINSTEM:  Diffusely atrophic. Chronic bilateral pontine lacunar infarcts,  right more extensive than left. CALVARIUM:    1. Multiple scattered T1 hypointense foci in the calvarium, suspicious for  metastatic disease. Not identified as destructive lesions on recent head CT. VASCULAR SYSTEM:  Normal flow voids. PARANASAL SINUSES AND MASTOID AIR CELLS:  Clear. VISUALIZED ORBITS:  Previous bilateral cataract surgery. VISUALIZED UPPER CERVICAL SPINE:  Normal.  SELLA:  Normal.  SKULL BASE:  Normal. No cranial nerve thickening or abnormal enhancement, but  some images compromised by patient motion. Impression IMPRESSION:    1. No acute findings. No evidence of vestibular schwannoma. 2. Extensive cerebral white matter signal abnormality and chronic pontine  infarcts, most likely due to chronic small vessel ischemic change. Cerebral  volume loss and prominent brainstem volume loss. 3. Areas of focal abnormality within the calvarium bilaterally, of concern for  metastatic disease. Correlation with radionuclide bone scan recommended. 23X              VAS/US/Carotid Doppler Results (maximum last 3): Results from [facility] encounter on 02/17/17   DUPLEX CAROTID BILATERAL    PET Results (maximum last 3): No results found for this or any previous visit. Assessment and Plan        51-year-old woman presenting with some concerns of confusion and altered mental status following a complicated critically ill hospital course. On examination she is a little confused but globally appropriate and intact for the situation. She does have some right facial weakness which appears new to me. Differential for her confusion is quite large and metabolic changes and hypoxic events are high on that list.  Her MRI in the past shows diffuse chronic ischemic disease as well as atrophy which suggests low threshold for mental status changes in the setting of acute illness. Given this right facial weakness I do think another MRI of the brain is indicated, recommend include contrast given the concern for possible additional metastasis. Continue current medical management. I will follow-up once the imaging is done.         [facility],   NEUROLOGIST  Diplomate ANTOINETTEN  4/23/2018

## 2018-04-23 NOTE — PROGRESS NOTES
2000. Assumed care of pt; pt sleeping on NC; VSS; son at the bedside; pt opens eyes to stimulus and voice; oriented to person and place; falls back asleep when not stimulated; lungs clear; will use BIPAP overnight starting at 10p unless needed earlier; son updated on plan of care    2200. BIPAP put on pt 12/6 40%    0200. Pt doesn't want to wear BIPAP any longer, now on RA    0530. Pt bathed; with activity SBP >140    0700. SBP>160; Labetalol drip started; Cardiology at the bedside rounding on pt; updated on events over the weekend. 0730.  Report given to oncoming RN

## 2018-04-23 NOTE — CONSULTS
Breast Cancer Consult    Subjective:     Patient is a 82 y.o. female being seen for left breast mass. She was admitted to the hospital for mi and cardiac issues. On exam a left breast mass noted. Scans show metastatic disease to bone, possible brain. I was consulted for tissue biopsy. Her son is at bedside.      Patient Active Problem List    Diagnosis Date Noted    Altered mental status, unspecified 04/23/2018    SOB (shortness of breath) 04/16/2018    CKD (chronic kidney disease) stage 3, GFR 30-59 ml/min 10/25/2017    Vitamin D deficiency 06/16/2017    Asymptomatic hyperuricemia 02/16/2017    Statin intolerance 12/21/2016    Long-term use of immunosuppressant medication 11/21/2016    Seropositive rheumatoid arthritis of multiple sites ([facility] 09/28/2016    Primary osteoarthritis of both knees 09/28/2016    Occlusion and stenosis of carotid artery without mention of cerebral infarction 08/23/2013    Weakness due to cerebrovascular accident 04/02/2012    Neuropathy in diabetes ([facility] 04/02/2012    PAD (peripheral artery disease) ([facility] 09/07/2011    Carotid bruit 08/31/2011    Claudication ([facility] 08/31/2011    Weakness of left arm 08/31/2011    Hyperlipidemia 01/27/2010    DM (diabetes mellitus) ([facility] 09/02/2009    Hypothyroid 09/02/2009    Colon cancer ([facility] 09/02/2009    H/O: CVA 09/02/2009    Microalbuminuria 09/02/2009    Age-related osteoporosis without current pathological fracture 09/02/2009    Essential hypertension 09/02/2009    Anemia 09/02/2009     Past Medical History:   Diagnosis Date    Anemia 9/2/2009    Colon cancer ([facility] 9/2/2009    surgery/chemo    Colon cancer ([facility] 9/2/2009    DM (diabetes mellitus) ([facility] 9/2/2009    GERD (gastroesophageal reflux disease)     H/O: CVA 9/2/2009    slight l sided weakness    Hypothyroid 9/2/2009    Ill-defined condition     seasonal allergies    Microalbuminuria 9/2/2009    Osteoporosis 9/2/2009    Other and unspecified hyperlipidemia 2010    Rheumatoid arthritis involving ankle ([address] 2016    Rheumatoid arthritis(714.0) 2009    Unspecified essential hypertension 2009    Weakness due to cerebrovascular accident       Past Surgical History:   Procedure Laterality Date    HX COLECTOMY      HX HYSTERECTOMY      HX OTHER SURGICAL      colonoscopies numerous since       Social History   Substance Use Topics    Smoking status: Never Smoker    Smokeless tobacco: Never Used    Alcohol use No      Family History   Problem Relation Age of Onset    Diabetes Mother     Stroke Mother     Diabetes Sister     Other Sister      fell and hit her head -  of this   [facility] Diabetes Brother     Cancer Father      stomach    Diabetes Sister       Current Facility-Administered Medications   Medication Dose Route Frequency    potassium chloride 10 mEq in 50 ml IVPB  10 mEq IntraVENous Q1H    carvedilol (COREG) tablet 6.25 mg  6.25 mg Oral BID WITH MEALS    nitroglycerin (NITROBID) 2 % ointment 1 Inch  1 Inch Topical Q6H PRN    hydrocortisone Sod Succ (PF) (SOLU-CORTEF) injection 25 mg  25 mg IntraVENous Q8H    sodium chloride (NS) flush 10-30 mL  10-30 mL InterCATHeter PRN    sodium chloride (NS) flush 10 mL  10 mL InterCATHeter Q24H    sodium chloride (NS) flush 10 mL  10 mL InterCATHeter PRN    sodium chloride (NS) flush 10-40 mL  10-40 mL InterCATHeter Q8H    sodium chloride (NS) flush 20 mL  20 mL InterCATHeter Q24H    alteplase (CATHFLO) 1 mg in sterile water (preservative free) 1 mL injection  1 mg InterCATHeter PRN    anastrozole (ARIMIDEX) tablet 1 mg  1 mg Oral DAILY    0.9% sodium chloride infusion 250 mL  250 mL IntraVENous PRN    acetaminophen (TYLENOL) tablet 650 mg  650 mg Oral Q4H PRN    furosemide (LASIX) injection 40 mg  40 mg IntraVENous ONCE    epoetin celio (EPOGEN;PROCRIT) injection 10,000 Units  10,000 Units SubCUTAneous Q MON, WED & FRI    albuterol-ipratropium (DUO-NEB) 2.5 MG-0.5 MG/3 ML  3 mL Nebulization Q6H PRN    LORazepam (ATIVAN) tablet 0.5 mg  0.5 mg Oral Q6H PRN    losartan (COZAAR) tablet 25 mg  25 mg Oral DAILY    famotidine (PF) (PEPCID) 20 mg in sodium chloride 0.9% 10 mL injection  20 mg IntraVENous DAILY    piperacillin-tazobactam (ZOSYN) 3.375 g in 0.9% sodium chloride (MBP/ADV) 100 mL  3.375 g IntraVENous Q8H    glucose chewable tablet 16 g  4 Tab Oral PRN    dextrose (D50W) injection syrg 12.5-25 g  12.5-25 g IntraVENous PRN    glucagon (GLUCAGEN) injection 1 mg  1 mg IntraMUSCular PRN    insulin lispro (HUMALOG) injection   SubCUTAneous AC&HS    prochlorperazine (COMPAZINE) with saline injection 5 mg  5 mg IntraVENous Q8H PRN    ascorbic acid (vitamin C) (VITAMIN C) tablet 500 mg  500 mg Oral DAILY    aspirin delayed-release tablet 81 mg  81 mg Oral DAILY    cholecalciferol (VITAMIN D3) tablet 1,000 Units  1,000 Units Oral DAILY    clopidogrel (PLAVIX) tablet 75 mg  75 mg Oral DAILY    folic acid (FOLVITE) tablet 1 mg  1 mg Oral DAILY    glimepiride (AMARYL) tablet 1 mg  1 mg Oral 7am    magnesium oxide (MAG-OX) tablet 400 mg  400 mg Oral DAILY    therapeutic multivitamin (THERAGRAN) tablet 1 Tab  1 Tab Oral DAILY    fish oil-omega-3 fatty acids 340-1,000 mg capsule 1 Cap  1 Cap Oral DAILY    SITagliptin (JANUVIA) tablet tab 50 mg  50 mg Oral DAILY    sodium chloride (NS) flush 5-10 mL  5-10 mL IntraVENous Q8H    sodium chloride (NS) flush 5-10 mL  5-10 mL IntraVENous PRN    acetaminophen (TYLENOL) tablet 650 mg  650 mg Oral Q4H PRN    ondansetron (ZOFRAN) injection 4 mg  4 mg IntraVENous Q4H PRN    levothyroxine (SYNTHROID) tablet 88 mcg  88 mcg Oral ACB      Allergies   Allergen Reactions    Statins-Hmg-Coa Reductase Inhibitors Other (comments)     Intolerant to statins     Sulfa (Sulfonamide Antibiotics) Other (comments)     syncope        Review of Systems:  A comprehensive review of systems was negative except for that written in the History of Present Illness. Objective:     Patient Vitals for the past 8 hrs:   BP Temp Pulse Resp SpO2   04/23/18 1200 - 97.7 °F (36.5 °C) - - -   04/23/18 1100 164/55 - 78 19 100 %   04/23/18 1000 166/76 - 74 18 98 %   04/23/18 0900 147/67 - 78 22 100 %   04/23/18 0800 177/62 98.1 °F (36.7 °C) 81 29 98 %   04/23/18 0700 175/66 - 78 19 99 %   04/23/18 0600 166/77 - 99 30 98 %       Physical Exam:  Breasts: 8 x 8 cm firm mass left breast 3:00, no skin ulceration but focal skin thickening. No masses on right breast. Difficult to palpate left axillary adenopathy. No cervical or supraclavicular adenopathy. 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 In order for Dr. Caroline Fajardo to optimize treatment she needs a tissue biopsy. I spoke with  [facility] in cardiology and her plavix and aspirin cannot be held. Will leave on plavix and aspirin for biopsy. Dr. Curtis Matute in women's imaging will do ultrasound and core biopsy of left breast this afternoon. I notified the son of this. Discussed risk of bleeding slightly higher and I will be available for any bleeding issues. Overall treatment will be systemic therapy for suspected stage IV breast cancer. She has been started on endocrine therapy. No indication at this time for palliative mastectomy. I explained this to her son and her understands the goal is not curative but to treat her and prolong her life if possible.          Signed By: Rinku Harkins MD     April 23, 2018

## 2018-04-24 ENCOUNTER — APPOINTMENT (OUTPATIENT)
Dept: GENERAL RADIOLOGY | Age: 83
DRG: 853 | End: 2018-04-24
Attending: INTERNAL MEDICINE
Payer: MEDICARE

## 2018-04-24 ENCOUNTER — APPOINTMENT (OUTPATIENT)
Dept: CT IMAGING | Age: 83
DRG: 853 | End: 2018-04-24
Attending: INTERNAL MEDICINE
Payer: MEDICARE

## 2018-04-24 PROBLEM — I50.21 ACUTE SYSTOLIC HEART FAILURE (HCC): Status: ACTIVE | Noted: 2018-04-24

## 2018-04-24 LAB
ALBUMIN SERPL-MCNC: 2.9 G/DL (ref 3.5–5)
ALBUMIN/GLOB SERPL: 0.6 {RATIO} (ref 1.1–2.2)
ALP SERPL-CCNC: 304 U/L (ref 45–117)
ALT SERPL-CCNC: 27 U/L (ref 12–78)
ANION GAP SERPL CALC-SCNC: 11 MMOL/L (ref 5–15)
ANION GAP SERPL CALC-SCNC: 12 MMOL/L (ref 5–15)
ARTERIAL PATENCY WRIST A: YES
ARTERIAL PATENCY WRIST A: YES
AST SERPL-CCNC: 39 U/L (ref 15–37)
ATRIAL RATE: 119 BPM
BASE EXCESS BLD CALC-SCNC: 0 MMOL/L
BASE EXCESS BLD CALC-SCNC: 1 MMOL/L
BASOPHILS # BLD: 0 K/UL (ref 0–0.1)
BASOPHILS NFR BLD: 0 % (ref 0–1)
BDY SITE: ABNORMAL
BDY SITE: NORMAL
BILIRUB SERPL-MCNC: 0.6 MG/DL (ref 0.2–1)
BUN SERPL-MCNC: 50 MG/DL (ref 6–20)
BUN SERPL-MCNC: 53 MG/DL (ref 6–20)
BUN/CREAT SERPL: 27 (ref 12–20)
BUN/CREAT SERPL: 30 (ref 12–20)
CA-I BLD-SCNC: 1.41 MMOL/L (ref 1.12–1.32)
CALCIUM SERPL-MCNC: 10.2 MG/DL (ref 8.5–10.1)
CALCIUM SERPL-MCNC: 10.2 MG/DL (ref 8.5–10.1)
CALCULATED P AXIS, ECG09: 57 DEGREES
CALCULATED R AXIS, ECG10: 79 DEGREES
CALCULATED T AXIS, ECG11: -130 DEGREES
CHLORIDE SERPL-SCNC: 103 MMOL/L (ref 97–108)
CHLORIDE SERPL-SCNC: 104 MMOL/L (ref 97–108)
CO2 SERPL-SCNC: 23 MMOL/L (ref 21–32)
CO2 SERPL-SCNC: 25 MMOL/L (ref 21–32)
CREAT SERPL-MCNC: 1.79 MG/DL (ref 0.55–1.02)
CREAT SERPL-MCNC: 1.86 MG/DL (ref 0.55–1.02)
DIAGNOSIS, 93000: NORMAL
DIFFERENTIAL METHOD BLD: ABNORMAL
EOSINOPHIL # BLD: 0 K/UL (ref 0–0.4)
EOSINOPHIL NFR BLD: 0 % (ref 0–7)
ERYTHROCYTE [DISTWIDTH] IN BLOOD BY AUTOMATED COUNT: 23.2 % (ref 11.5–14.5)
GAS FLOW.O2 O2 DELIVERY SYS: ABNORMAL L/MIN
GAS FLOW.O2 O2 DELIVERY SYS: NORMAL L/MIN
GAS FLOW.O2 SETTING OXYMISER: 2 L/M
GLOBULIN SER CALC-MCNC: 4.9 G/DL (ref 2–4)
GLUCOSE BLD STRIP.AUTO-MCNC: 151 MG/DL (ref 65–100)
GLUCOSE BLD STRIP.AUTO-MCNC: 212 MG/DL (ref 65–100)
GLUCOSE BLD STRIP.AUTO-MCNC: 212 MG/DL (ref 65–100)
GLUCOSE BLD STRIP.AUTO-MCNC: 222 MG/DL (ref 65–100)
GLUCOSE BLD STRIP.AUTO-MCNC: 225 MG/DL (ref 65–100)
GLUCOSE SERPL-MCNC: 188 MG/DL (ref 65–100)
GLUCOSE SERPL-MCNC: 192 MG/DL (ref 65–100)
HCO3 BLD-SCNC: 25.1 MMOL/L (ref 22–26)
HCO3 BLD-SCNC: 26.5 MMOL/L (ref 22–26)
HCT VFR BLD AUTO: 26.2 % (ref 35–47)
HGB BLD-MCNC: 8.2 G/DL (ref 11.5–16)
IMM GRANULOCYTES # BLD: 0 K/UL
IMM GRANULOCYTES NFR BLD AUTO: 0 %
LYMPHOCYTES # BLD: 1.5 K/UL (ref 0.8–3.5)
LYMPHOCYTES NFR BLD: 7 % (ref 12–49)
MAGNESIUM SERPL-MCNC: 1.8 MG/DL (ref 1.6–2.4)
MCH RBC QN AUTO: 28.1 PG (ref 26–34)
MCHC RBC AUTO-ENTMCNC: 31.3 G/DL (ref 30–36.5)
MCV RBC AUTO: 89.7 FL (ref 80–99)
METAMYELOCYTES NFR BLD MANUAL: 1 %
MONOCYTES # BLD: 2.1 K/UL (ref 0–1)
MONOCYTES NFR BLD: 10 % (ref 5–13)
NEUTS SEG # BLD: 17.1 K/UL (ref 1.8–8)
NEUTS SEG NFR BLD: 82 % (ref 32–75)
NRBC # BLD: 0.11 K/UL (ref 0–0.01)
NRBC BLD-RTO: 0.5 PER 100 WBC
P-R INTERVAL, ECG05: 134 MS
PCO2 BLD: 41 MMHG (ref 35–45)
PCO2 BLD: 46.3 MMHG (ref 35–45)
PH BLD: 7.37 [PH] (ref 7.35–7.45)
PH BLD: 7.39 [PH] (ref 7.35–7.45)
PHOSPHATE SERPL-MCNC: 4 MG/DL (ref 2.6–4.7)
PLATELET # BLD AUTO: 422 K/UL (ref 150–400)
PLATELET COMMENTS,PCOM: ABNORMAL
PMV BLD AUTO: 9.4 FL (ref 8.9–12.9)
PO2 BLD: 87 MMHG (ref 80–100)
PO2 BLD: 94 MMHG (ref 80–100)
POTASSIUM SERPL-SCNC: 3.1 MMOL/L (ref 3.5–5.1)
POTASSIUM SERPL-SCNC: 3.1 MMOL/L (ref 3.5–5.1)
PROT SERPL-MCNC: 7.8 G/DL (ref 6.4–8.2)
Q-T INTERVAL, ECG07: 326 MS
QRS DURATION, ECG06: 96 MS
QTC CALCULATION (BEZET), ECG08: 458 MS
RBC # BLD AUTO: 2.92 M/UL (ref 3.8–5.2)
RBC MORPH BLD: ABNORMAL
SAO2 % BLD: 97 % (ref 92–97)
SAO2 % BLD: 97 % (ref 92–97)
SERVICE CMNT-IMP: ABNORMAL
SODIUM SERPL-SCNC: 138 MMOL/L (ref 136–145)
SODIUM SERPL-SCNC: 140 MMOL/L (ref 136–145)
SPECIMEN TYPE: ABNORMAL
SPECIMEN TYPE: NORMAL
TROPONIN I SERPL-MCNC: 1.88 NG/ML
VENTRICULAR RATE, ECG03: 119 BPM
WBC # BLD AUTO: 20.8 K/UL (ref 3.6–11)

## 2018-04-24 PROCEDURE — 74011636637 HC RX REV CODE- 636/637: Performed by: HOSPITALIST

## 2018-04-24 PROCEDURE — 65610000006 HC RM INTENSIVE CARE

## 2018-04-24 PROCEDURE — 74011000250 HC RX REV CODE- 250: Performed by: NURSE PRACTITIONER

## 2018-04-24 PROCEDURE — 36600 WITHDRAWAL OF ARTERIAL BLOOD: CPT

## 2018-04-24 PROCEDURE — 74011250636 HC RX REV CODE- 250/636: Performed by: INTERNAL MEDICINE

## 2018-04-24 PROCEDURE — 74011250637 HC RX REV CODE- 250/637: Performed by: HOSPITALIST

## 2018-04-24 PROCEDURE — 74011250636 HC RX REV CODE- 250/636

## 2018-04-24 PROCEDURE — 74011250637 HC RX REV CODE- 250/637: Performed by: INTERNAL MEDICINE

## 2018-04-24 PROCEDURE — 74011250637 HC RX REV CODE- 250/637: Performed by: NURSE PRACTITIONER

## 2018-04-24 PROCEDURE — 84100 ASSAY OF PHOSPHORUS: CPT | Performed by: INTERNAL MEDICINE

## 2018-04-24 PROCEDURE — 94640 AIRWAY INHALATION TREATMENT: CPT

## 2018-04-24 PROCEDURE — 94660 CPAP INITIATION&MGMT: CPT

## 2018-04-24 PROCEDURE — 74011000250 HC RX REV CODE- 250: Performed by: INTERNAL MEDICINE

## 2018-04-24 PROCEDURE — 93005 ELECTROCARDIOGRAM TRACING: CPT

## 2018-04-24 PROCEDURE — 84484 ASSAY OF TROPONIN QUANT: CPT | Performed by: INTERNAL MEDICINE

## 2018-04-24 PROCEDURE — 85025 COMPLETE CBC W/AUTO DIFF WBC: CPT | Performed by: INTERNAL MEDICINE

## 2018-04-24 PROCEDURE — 80053 COMPREHEN METABOLIC PANEL: CPT | Performed by: INTERNAL MEDICINE

## 2018-04-24 PROCEDURE — 74011250636 HC RX REV CODE- 250/636: Performed by: NURSE PRACTITIONER

## 2018-04-24 PROCEDURE — 80048 BASIC METABOLIC PNL TOTAL CA: CPT | Performed by: HOSPITALIST

## 2018-04-24 PROCEDURE — 82962 GLUCOSE BLOOD TEST: CPT

## 2018-04-24 PROCEDURE — 71045 X-RAY EXAM CHEST 1 VIEW: CPT

## 2018-04-24 PROCEDURE — 76450000000

## 2018-04-24 PROCEDURE — 74176 CT ABD & PELVIS W/O CONTRAST: CPT

## 2018-04-24 PROCEDURE — 82803 BLOOD GASES ANY COMBINATION: CPT

## 2018-04-24 PROCEDURE — 74011250636 HC RX REV CODE- 250/636: Performed by: HOSPITALIST

## 2018-04-24 PROCEDURE — 36415 COLL VENOUS BLD VENIPUNCTURE: CPT | Performed by: HOSPITALIST

## 2018-04-24 PROCEDURE — 77030029684 HC NEB SM VOL KT MONA -A

## 2018-04-24 PROCEDURE — 77030013140 HC MSK NEB VYRM -A

## 2018-04-24 PROCEDURE — 74011000258 HC RX REV CODE- 258: Performed by: INTERNAL MEDICINE

## 2018-04-24 PROCEDURE — 83735 ASSAY OF MAGNESIUM: CPT | Performed by: INTERNAL MEDICINE

## 2018-04-24 PROCEDURE — 70450 CT HEAD/BRAIN W/O DYE: CPT

## 2018-04-24 RX ORDER — LEVOFLOXACIN 5 MG/ML
500 INJECTION, SOLUTION INTRAVENOUS
Status: DISCONTINUED | OUTPATIENT
Start: 2018-04-26 | End: 2018-04-27

## 2018-04-24 RX ORDER — BUMETANIDE 0.25 MG/ML
2 INJECTION INTRAMUSCULAR; INTRAVENOUS EVERY 12 HOURS
Status: DISCONTINUED | OUTPATIENT
Start: 2018-04-24 | End: 2018-04-27

## 2018-04-24 RX ORDER — HYDRALAZINE HYDROCHLORIDE 20 MG/ML
10 INJECTION INTRAMUSCULAR; INTRAVENOUS ONCE
Status: COMPLETED | OUTPATIENT
Start: 2018-04-24 | End: 2018-04-24

## 2018-04-24 RX ORDER — NITROGLYCERIN 20 MG/100ML
10-200 INJECTION INTRAVENOUS
Status: DISCONTINUED | OUTPATIENT
Start: 2018-04-24 | End: 2018-04-25

## 2018-04-24 RX ORDER — LEVOFLOXACIN 5 MG/ML
750 INJECTION, SOLUTION INTRAVENOUS ONCE
Status: COMPLETED | OUTPATIENT
Start: 2018-04-24 | End: 2018-04-28

## 2018-04-24 RX ORDER — POTASSIUM CHLORIDE 14.9 MG/ML
10 INJECTION INTRAVENOUS
Status: COMPLETED | OUTPATIENT
Start: 2018-04-24 | End: 2018-04-28

## 2018-04-24 RX ORDER — BUMETANIDE 0.25 MG/ML
2 INJECTION INTRAMUSCULAR; INTRAVENOUS ONCE
Status: COMPLETED | OUTPATIENT
Start: 2018-04-24 | End: 2018-04-24

## 2018-04-24 RX ORDER — ASPIRIN 300 MG/1
300 SUPPOSITORY RECTAL DAILY
Status: DISCONTINUED | OUTPATIENT
Start: 2018-04-24 | End: 2018-04-25

## 2018-04-24 RX ORDER — FLUMAZENIL 0.1 MG/ML
0.2 INJECTION INTRAVENOUS ONCE
Status: COMPLETED | OUTPATIENT
Start: 2018-04-24 | End: 2018-04-24

## 2018-04-24 RX ORDER — FUROSEMIDE 10 MG/ML
INJECTION INTRAMUSCULAR; INTRAVENOUS
Status: COMPLETED
Start: 2018-04-24 | End: 2018-04-24

## 2018-04-24 RX ORDER — SIMETHICONE 80 MG
80 TABLET,CHEWABLE ORAL
Status: COMPLETED | OUTPATIENT
Start: 2018-04-24 | End: 2018-04-24

## 2018-04-24 RX ORDER — METOPROLOL TARTRATE 5 MG/5ML
5 INJECTION INTRAVENOUS EVERY 6 HOURS
Status: DISCONTINUED | OUTPATIENT
Start: 2018-04-24 | End: 2018-04-25

## 2018-04-24 RX ORDER — FUROSEMIDE 10 MG/ML
40 INJECTION INTRAMUSCULAR; INTRAVENOUS ONCE
Status: DISCONTINUED | OUTPATIENT
Start: 2018-04-24 | End: 2018-04-24 | Stop reason: ALTCHOICE

## 2018-04-24 RX ADMIN — ACETAMINOPHEN 650 MG: 325 TABLET, FILM COATED ORAL at 23:47

## 2018-04-24 RX ADMIN — HYDRALAZINE HYDROCHLORIDE 10 MG: 20 INJECTION INTRAMUSCULAR; INTRAVENOUS at 06:06

## 2018-04-24 RX ADMIN — POTASSIUM CHLORIDE 10 MEQ: 200 INJECTION, SOLUTION INTRAVENOUS at 17:58

## 2018-04-24 RX ADMIN — Medication 20 ML: at 11:18

## 2018-04-24 RX ADMIN — LEVOTHYROXINE SODIUM 88 MCG: 88 TABLET ORAL at 06:35

## 2018-04-24 RX ADMIN — BUMETANIDE 2 MG: 0.25 INJECTION INTRAMUSCULAR; INTRAVENOUS at 21:09

## 2018-04-24 RX ADMIN — FUROSEMIDE 40 MG: 10 INJECTION, SOLUTION INTRAMUSCULAR; INTRAVENOUS at 04:04

## 2018-04-24 RX ADMIN — NITROGLYCERIN 1 INCH: 20 OINTMENT TOPICAL at 00:47

## 2018-04-24 RX ADMIN — NITROGLYCERIN 1 INCH: 20 OINTMENT TOPICAL at 08:47

## 2018-04-24 RX ADMIN — NITROGLYCERIN 15 MCG/MIN: 20 INJECTION INTRAVENOUS at 11:31

## 2018-04-24 RX ADMIN — Medication 10 ML: at 13:26

## 2018-04-24 RX ADMIN — NITROGLYCERIN 10 MCG/MIN: 20 INJECTION INTRAVENOUS at 13:33

## 2018-04-24 RX ADMIN — Medication 10 ML: at 11:18

## 2018-04-24 RX ADMIN — IPRATROPIUM BROMIDE AND ALBUTEROL SULFATE 3 ML: .5; 3 SOLUTION RESPIRATORY (INHALATION) at 12:37

## 2018-04-24 RX ADMIN — POTASSIUM CHLORIDE 10 MEQ: 200 INJECTION, SOLUTION INTRAVENOUS at 08:53

## 2018-04-24 RX ADMIN — INSULIN LISPRO 2 UNITS: 100 INJECTION, SOLUTION INTRAVENOUS; SUBCUTANEOUS at 06:37

## 2018-04-24 RX ADMIN — IPRATROPIUM BROMIDE AND ALBUTEROL SULFATE 3 ML: .5; 3 SOLUTION RESPIRATORY (INHALATION) at 01:17

## 2018-04-24 RX ADMIN — HYDRALAZINE HYDROCHLORIDE 10 MG: 20 INJECTION INTRAMUSCULAR; INTRAVENOUS at 10:48

## 2018-04-24 RX ADMIN — POTASSIUM CHLORIDE 10 MEQ: 200 INJECTION, SOLUTION INTRAVENOUS at 16:42

## 2018-04-24 RX ADMIN — METOPROLOL TARTRATE 5 MG: 5 INJECTION, SOLUTION INTRAVENOUS at 16:27

## 2018-04-24 RX ADMIN — PIPERACILLIN SODIUM,TAZOBACTAM SODIUM 3.38 G: 3; .375 INJECTION, POWDER, FOR SOLUTION INTRAVENOUS at 00:48

## 2018-04-24 RX ADMIN — NITROGLYCERIN 15 MCG/MIN: 20 INJECTION INTRAVENOUS at 10:13

## 2018-04-24 RX ADMIN — HYDROCORTISONE SODIUM SUCCINATE 25 MG: 100 INJECTION, POWDER, FOR SOLUTION INTRAMUSCULAR; INTRAVENOUS at 00:56

## 2018-04-24 RX ADMIN — LEVOFLOXACIN 750 MG: 5 INJECTION, SOLUTION INTRAVENOUS at 13:36

## 2018-04-24 RX ADMIN — POTASSIUM CHLORIDE 10 MEQ: 200 INJECTION, SOLUTION INTRAVENOUS at 09:00

## 2018-04-24 RX ADMIN — NITROGLYCERIN 10 MCG/MIN: 20 INJECTION INTRAVENOUS at 09:39

## 2018-04-24 RX ADMIN — METOPROLOL TARTRATE 5 MG: 5 INJECTION, SOLUTION INTRAVENOUS at 23:52

## 2018-04-24 RX ADMIN — METOPROLOL TARTRATE 5 MG: 5 INJECTION, SOLUTION INTRAVENOUS at 11:11

## 2018-04-24 RX ADMIN — HYDROCORTISONE SODIUM SUCCINATE 25 MG: 100 INJECTION, POWDER, FOR SOLUTION INTRAMUSCULAR; INTRAVENOUS at 16:28

## 2018-04-24 RX ADMIN — FUROSEMIDE 40 MG: 10 INJECTION INTRAMUSCULAR; INTRAVENOUS at 04:04

## 2018-04-24 RX ADMIN — SIMETHICONE CHEW TAB 80 MG 80 MG: 80 TABLET ORAL at 00:46

## 2018-04-24 RX ADMIN — INSULIN LISPRO 3 UNITS: 100 INJECTION, SOLUTION INTRAVENOUS; SUBCUTANEOUS at 16:30

## 2018-04-24 RX ADMIN — Medication 10 ML: at 21:15

## 2018-04-24 RX ADMIN — LORAZEPAM 0.5 MG: 0.5 TABLET ORAL at 01:33

## 2018-04-24 RX ADMIN — IPRATROPIUM BROMIDE AND ALBUTEROL SULFATE 3 ML: .5; 3 SOLUTION RESPIRATORY (INHALATION) at 06:15

## 2018-04-24 RX ADMIN — FLUMAZENIL 0.2 MG: 0.1 INJECTION, SOLUTION INTRAVENOUS at 06:06

## 2018-04-24 RX ADMIN — MEROPENEM 500 MG: 500 INJECTION, POWDER, FOR SOLUTION INTRAVENOUS at 13:21

## 2018-04-24 RX ADMIN — Medication 10 ML: at 06:00

## 2018-04-24 RX ADMIN — BUMETANIDE 2 MG: 0.25 INJECTION INTRAMUSCULAR; INTRAVENOUS at 10:48

## 2018-04-24 RX ADMIN — HYDROCORTISONE SODIUM SUCCINATE 25 MG: 100 INJECTION, POWDER, FOR SOLUTION INTRAMUSCULAR; INTRAVENOUS at 08:47

## 2018-04-24 RX ADMIN — PIPERACILLIN SODIUM,TAZOBACTAM SODIUM 3.38 G: 3; .375 INJECTION, POWDER, FOR SOLUTION INTRAVENOUS at 10:20

## 2018-04-24 RX ADMIN — INSULIN LISPRO 3 UNITS: 100 INJECTION, SOLUTION INTRAVENOUS; SUBCUTANEOUS at 12:41

## 2018-04-24 RX ADMIN — Medication 10 ML: at 21:14

## 2018-04-24 RX ADMIN — NITROGLYCERIN 20 MCG/MIN: 20 INJECTION INTRAVENOUS at 10:49

## 2018-04-24 NOTE — PROGRESS NOTES
Occupational Therapy  Attempted to see for treatment however currently getting ready to transfer to stepdown unit, will follow up as able.    Cynthia Lake, OTR/L

## 2018-04-24 NOTE — PROGRESS NOTES
2000:  Introduced myself to patient as primary nurse. Patient's son informed this nurse that he believed patient was supposed to receive a blood transfusion today. Per son consent had not been obtained and discussion about transfusion had not taken place with a physician. Transfusion order was placed at 1030 today. 2045:  Called and spoke to Dr. [place] Gant who advised to hold transfusion for now as patient's hemoglobin is currently 7.4. Patient informed and verbalized understanding.

## 2018-04-24 NOTE — PROGRESS NOTES
Hematology-Oncology Progress Note    Antony Faith  1935  [id number]  4/24/2018    Follow-up for: abnormal brain MRI     [x]        Chart notes since last visit reviewed   [x]        Medications reviewed for allergies and interactions       Case discussed with the following:         []                            [x]        Nursing Staff                                                                         []        Pathologist                                                                        [x]        FAMILY      Subjective:     Spoke with patient who complains of: pt had a rough night, significant agitation required ativan now unresponsive except to pain,    Objective:     Patient Vitals for the past 24 hrs:   BP Temp Pulse Resp SpO2 Weight   04/24/18 1013 (!) 175/98 - (!) 115 - - -   04/24/18 1010 (!) 175/98 - - - - -   04/24/18 0925 169/87 - (!) 112 - - -   04/24/18 0837 - - - - 100 % -   04/24/18 0813 164/67 97.5 °F (36.4 °C) (!) 114 26 100 % -   04/24/18 0634 - - - - - 59.9 kg (132 lb)   04/24/18 0615 - - - - 100 % -   04/24/18 0607 166/66 - (!) 110 - - -   04/24/18 0508 177/88 98.1 °F (36.7 °C) (!) 119 28 100 % -   04/24/18 0301 (!) 160/99 97.1 °F (36.2 °C) (!) 122 18 97 % -   04/24/18 0147 (!) 185/117 - - - - -   04/24/18 0117 - - - - 97 % -   04/24/18 0023 (!) 190/104 98.2 °F (36.8 °C) 90 19 97 % -   04/23/18 2000 - - - - 98 % -   04/23/18 1950 169/57 97.8 °F (36.6 °C) 82 20 98 % -   04/23/18 1839 166/73 - 82 - - -   04/23/18 1628 178/68 98.3 °F (36.8 °C) - - 100 % -   04/23/18 1255 174/66 98.7 °F (37.1 °C) - 22 100 % -   04/23/18 1200 - 97.7 °F (36.5 °C) 88 16 99 % -   04/23/18 1100 164/55 - 78 19 100 % -       REVIEW OF SYSTEMS:    Constitutional: negative fever, negative chills, negative weight loss  Eyes:   negative visual changes  ENT:   negative sore throat, tongue or lip swelling  Respiratory:  negative cough, negative dyspnea  Cards:  negative for chest pain, palpitations, lower extremity edema  GI:   negative for nausea, vomiting, diarrhea, and abdominal pain  Neuro:  negative for headaches, dizziness, vertigo  []                        Full ROS o/w normal/non contributor    Constitutional:  Patient looks  [x]        Sick  []        Frail  []        Better                                                 []        Depressed    HEENT:  [x]   NC                         []   AT               []    ALOPECIA           Eyes: [x]   Normal               []    Icteric  Oropharynx: []    Normal                  []  Thrush               []   Dry  Mucositis: []    None                 Grade: []        I  []        II  []        III  []        IV  Neck:   [x]   Supple                  []  Rigid      Breast.. Left upper outer quadrant mass            Lungs clear anteriorly  Cor tachy  Abd.  Soft non tender  Ext, no edema  Skin:  Intact [x]           Purpura []        Rash: [x]   ABSENT       []  PRESENT  Neuro:  []        Normal  [x]        Confused lethargic    Available labs reviewed:  Labs:    Recent Results (from the past 24 hour(s))   TYPE + CROSSMATCH    Collection Time: 04/23/18 10:59 AM   Result Value Ref Range    Crossmatch Expiration 04/26/2018     ABO/Rh(D) O POSITIVE     Antibody screen NEG     Unit number [id number]     Blood component type RC LR,1     Unit division 00     Status of unit ALLOCATED     Crossmatch result Compatible    GLUCOSE, POC    Collection Time: 04/23/18 11:25 AM   Result Value Ref Range    Glucose (POC) 105 (H) 65 - 100 mg/dL    Performed by Constanza Muñiz    POTASSIUM    Collection Time: 04/23/18  3:00 PM   Result Value Ref Range    Potassium 3.1 (L) 3.5 - 5.1 mmol/L   GLUCOSE, POC    Collection Time: 04/23/18  4:55 PM   Result Value Ref Range    Glucose (POC) 113 (H) 65 - 100 mg/dL    Performed by Kandi Mi    GLUCOSE, POC    Collection Time: 04/23/18  9:35 PM   Result Value Ref Range    Glucose (POC) 131 (H) 65 - 100 mg/dL    Performed by Bhavya Echols PCT    GLUCOSE, POC    Collection Time: 04/24/18  2:36 AM   Result Value Ref Range    Glucose (POC) 212 (H) 65 - 100 mg/dL    Performed by Karlie JOHNSTON    METABOLIC PANEL, BASIC    Collection Time: 04/24/18  3:19 AM   Result Value Ref Range    Sodium 140 136 - 145 mmol/L    Potassium 3.1 (L) 3.5 - 5.1 mmol/L    Chloride 104 97 - 108 mmol/L    CO2 25 21 - 32 mmol/L    Anion gap 11 5 - 15 mmol/L    Glucose 192 (H) 65 - 100 mg/dL    BUN 53 (H) 6 - 20 MG/DL    Creatinine 1.79 (H) 0.55 - 1.02 MG/DL    BUN/Creatinine ratio 30 (H) 12 - 20      GFR est AA 33 (L) >60 ml/min/1.73m2    GFR est non-AA 27 (L) >60 ml/min/1.73m2    Calcium 10.2 (H) 8.5 - 10.1 MG/DL   CBC WITH AUTOMATED DIFF    Collection Time: 04/24/18  3:19 AM   Result Value Ref Range    WBC 20.8 (H) 3.6 - 11.0 K/uL    RBC 2.92 (L) 3.80 - 5.20 M/uL    HGB 8.2 (L) 11.5 - 16.0 g/dL    HCT 26.2 (L) 35.0 - 47.0 %    MCV 89.7 80.0 - 99.0 FL    MCH 28.1 26.0 - 34.0 PG    MCHC 31.3 30.0 - 36.5 g/dL    RDW 23.2 (H) 11.5 - 14.5 %    PLATELET 957 (H) 259 - 400 K/uL    MPV 9.4 8.9 - 12.9 FL    NRBC 0.5 (H) 0  WBC    ABSOLUTE NRBC 0.11 (H) 0.00 - 0.01 K/uL    NEUTROPHILS 82 (H) 32 - 75 %    LYMPHOCYTES 7 (L) 12 - 49 %    MONOCYTES 10 5 - 13 %    EOSINOPHILS 0 0 - 7 %    BASOPHILS 0 0 - 1 %    METAMYELOCYTES 1 (H) 0 %    IMMATURE GRANULOCYTES 0 %    ABS. NEUTROPHILS 17.1 (H) 1.8 - 8.0 K/UL    ABS. LYMPHOCYTES 1.5 0.8 - 3.5 K/UL    ABS. MONOCYTES 2.1 (H) 0.0 - 1.0 K/UL    ABS. EOSINOPHILS 0.0 0.0 - 0.4 K/UL    ABS. BASOPHILS 0.0 0.0 - 0.1 K/UL    ABS. IMM.  GRANS. 0.0 K/UL    DF MANUAL      PLATELET COMMENTS Large Platelets      RBC COMMENTS ANISOCYTOSIS  2+        RBC COMMENTS TARGET CELLS  PRESENT        RBC COMMENTS OVALOCYTES  PRESENT        RBC COMMENTS POLYCHROMASIA  1+        RBC COMMENTS RBC FRAGMENTS  PRESENT       METABOLIC PANEL, COMPREHENSIVE    Collection Time: 04/24/18  3:19 AM   Result Value Ref Range    Sodium 138 136 - 145 mmol/L    Potassium 3.1 (L) 3.5 - 5.1 mmol/L    Chloride 103 97 - 108 mmol/L    CO2 23 21 - 32 mmol/L    Anion gap 12 5 - 15 mmol/L    Glucose 188 (H) 65 - 100 mg/dL    BUN 50 (H) 6 - 20 MG/DL    Creatinine 1.86 (H) 0.55 - 1.02 MG/DL    BUN/Creatinine ratio 27 (H) 12 - 20      GFR est AA 32 (L) >60 ml/min/1.73m2    GFR est non-AA 26 (L) >60 ml/min/1.73m2    Calcium 10.2 (H) 8.5 - 10.1 MG/DL    Bilirubin, total 0.6 0.2 - 1.0 MG/DL    ALT (SGPT) 27 12 - 78 U/L    AST (SGOT) 39 (H) 15 - 37 U/L    Alk.  phosphatase 304 (H) 45 - 117 U/L    Protein, total 7.8 6.4 - 8.2 g/dL    Albumin 2.9 (L) 3.5 - 5.0 g/dL    Globulin 4.9 (H) 2.0 - 4.0 g/dL    A-G Ratio 0.6 (L) 1.1 - 2.2     MAGNESIUM    Collection Time: 04/24/18  3:19 AM   Result Value Ref Range    Magnesium 1.8 1.6 - 2.4 mg/dL   PHOSPHORUS    Collection Time: 04/24/18  3:19 AM   Result Value Ref Range    Phosphorus 4.0 2.6 - 4.7 MG/DL   TROPONIN I    Collection Time: 04/24/18  3:19 AM   Result Value Ref Range    Troponin-I, Qt. 1.88 (H) <0.05 ng/mL   EKG, 12 LEAD, INITIAL    Collection Time: 04/24/18  3:24 AM   Result Value Ref Range    Ventricular Rate 119 BPM    Atrial Rate 119 BPM    P-R Interval 134 ms    QRS Duration 96 ms    Q-T Interval 326 ms    QTC Calculation (Bezet) 458 ms    Calculated P Axis 57 degrees    Calculated R Axis 79 degrees    Calculated T Axis -130 degrees    Diagnosis       Sinus tachycardia  Septal infarct (cited on or before 24-APR-2018)  Marked ST abnormality, possible inferior subendocardial injury  When compared with ECG of 22-APR-2018 05:16,  Serial changes of evolving Septal infarct present     POC EG7    Collection Time: 04/24/18  3:31 AM   Result Value Ref Range    Calcium, ionized (POC) 1.41 (H) 1.12 - 1.32 mmol/L    pH (POC) 7.365 7.35 - 7.45      pCO2 (POC) 46.3 (H) 35.0 - 45.0 MMHG    pO2 (POC) 94 80 - 100 MMHG    HCO3 (POC) 26.5 (H) 22 - 26 MMOL/L    Base excess (POC) 1 mmol/L    sO2 (POC) 97 92 - 97 %    Site LEFT RADIAL      Device: ROOM AIR      Julian test (POC) YES      Specimen type (POC) ARTERIAL     GLUCOSE, POC    Collection Time: 04/24/18  6:02 AM   Result Value Ref Range    Glucose (POC) 212 (H) 65 - 100 mg/dL    Performed by Kristine Doran        Available Xrays reviewed:    Chemotherapy monitored and toxicities assessed:    Assessment and Plan   1. Probable metastatic breast cancer. The bone scan shows diffuse mets, the [id number] anc cea are elevated and the pt has a palpable left breast mass,,,she had a biopsy done in Ultrasound yesterday,,,suspect it will be ER + given the duration of mass in left breast ,   If so she could very well respond nicely to femara +/- ibrance. .  I had a long talk with his son today and rather than wait a week to begin femara we will begin that today. .(femara not on formulary, started arimidex on 4/23). 2. Anemia. .. Pt has bone mets, suspect this is due to chronic disease +- marrow involvement,  started procrit weekly on 4/23,,, given one unit prbc 4/23 as well, count up to 8.2 today       3. CHF. Shonda Guzman Per cardiology. .   4. AMS. . Secondary to ativan +/- hypoxia other causes?,,, She has no evidence of brain mets on MRI ,but does have calvarial mets    4.    Code status,,, d/w son today they dont want CPR but do want supportive care up to and including intubation if necessary    Susan Varma MD

## 2018-04-24 NOTE — CONSULTS
Palliative Medicine Consult  Williams: [phone] ()    Patient Name: Darwin Trinidad  YOB: 1935    Date of Initial Consult: 18  Reason for Consult: care decisions  Requesting Provider: Dr. Isaiah Solano  Primary Care Physician: Zoe Gaffney MD     SUMMARY:   Darwin Trinidad is a 82 y.o. with a past history of rheumatoid arthritis, CKD stage 3, DM w neuropathy, hx colon cancer 26 years ago,  who was admitted on 2018 from home  with a diagnosis of shortness of breath, pulmonary edema. Current medical issues leading to Palliative Medicine involvement include: new diagnosis of metastatic breast cancer (biopsy pending),  + bone mets, possible liver mets, now in ICU with recurrent respiratory failure / on BIPAP. NSTEMI at admission (peak troponin 18.9), anemia (transfused  one unit), UTI (e Coli), acute on chronic renal failure (cr 1.8)    Patient and her  moved to [facility] in  from [facility]. Patient's  of over 48 years  in 2017 of pancreatic cancer.  (w hospice services). Patient lives alone, but has paid caregivers. She has 3 adult sons-- son Krystyna Cervantes (oldest son), is [facility] [phone]       PALLIATIVE DIAGNOSES:   1. Acute respiratory failure, possible pneumonia  2. Constipation  3. CHF, NSTEMI  4. Anemia  5. CKD   6. delirium       PLAN:   1. Palliative Medicine services introduced to patient's son, Collins Self (friend also present) . See also Agata Marti note. 1. Harnett of recent medical events. 2. Collins Self has good understanding of current issues. 3. Goals at this time are clear, to pursue treatments, see how she can do., awaiting pathology expected back Thursday maybe. 4. He believes patient would be accepting of intubation as bridge to recovery. He wants her to have protection from shock, CPR if she dies. 5. He tells of how surprised he was to see her change so rapidly -- from doing poorly to doing so much better, to doing poorly again.   He knows she might not bounce back like that again, but her recent ability to do so gives him hope that she can do it again. 2. Initial consult note routed to primary continuity provider  3. Communicated plan of care with: Palliative IDT       GOALS OF CARE / TREATMENT PREFERENCES:     GOALS OF CARE:  Patient/Health Care Proxy Stated Goals: Other (comment) (waiting on pathology, for now continue aggressive measures, but no CPR /shock if she dies. temporary intubation OK)  Awaiting pathology, and palliative treatment options    TREATMENT PREFERENCES:   Code Status: Partial Code    Advance Care Planning:  Advance Care Planning 4/24/2018   Patient's Healthcare Decision Maker is: Verbal statement (Legal Next of Kin remains as decision maker)   Primary Decision Maker Name Krystyna Cervantes   Primary Decision Maker Phone Number [phone]   Primary Decision Maker Relationship to Patient Adult child   Confirm Advance Directive Yes, not on file       Medical Interventions: Limited additional interventions   Other Instructions: Other:    As far as possible, the palliative care team has discussed with patient / health care proxy about goals of care / treatment preferences for patient. HISTORY:     History obtained from: chart, son    CHIEF COMPLAINT: admitted with shortness of breath    HPI/SUBJECTIVE:    The patient is:   [] Verbal and participatory  [x] Non-participatory due to:     82year old female, found to have possible metastatic disease in base of skull by Head CT in March 2018, who was admitted with report of worsening SOB.      Clinical Pain Assessment (nonverbal scale for severity on nonverbal patients):   Clinical Pain Assessment  Severity: 0     Activity (Movement): Lying quietly, normal position    Duration: for how long has pt been experiencing pain (e.g., 2 days, 1 month, years)  Frequency: how often pain is an issue (e.g., several times per day, once every few days, constant)     FUNCTIONAL ASSESSMENT:     Palliative Performance Scale (PPS):  PPS: 20       PSYCHOSOCIAL/SPIRITUAL SCREENING:     Palliative IDT has assessed this patient for cultural preferences / practices and a referral made as appropriate to needs (Cultural Services, Patient Advocacy, Ethics, etc.)    Advance Care Planning:  Advance Care Planning 4/24/2018   Patient's Healthcare Decision Maker is: Verbal statement (Legal Next of Kin remains as decision maker)   Primary Decision Maker Name Maria Del Carmen Zhou   Primary Decision Maker Phone Number [phone]   Primary Decision Maker Relationship to Patient Adult child   Confirm Advance Directive Yes, not on file       Any spiritual / [employer] concerns:  [] Yes /  [x] No    Caregiver Burnout:  [] Yes /  [x] No /  [] No Caregiver Present      Anticipatory grief assessment:   [x] Normal  / [] Maladaptive       ESAS Anxiety:      ESAS Depression:          REVIEW OF SYSTEMS:     Positive and pertinent negative findings in ROS are noted above in HPI. The following systems were [x] reviewed / [x] unable to be reviewed as noted in HPI  Other findings are noted below. Patient has delirium, unable to assess other than by observation, report from family  Systems: constitutional, ears/nose/mouth/throat, respiratory, gastrointestinal, genitourinary, musculoskeletal, integumentary, neurologic, psychiatric, endocrine. Positive findings noted below. Modified ESAS Completed by: provider   Fatigue: 10       Pain: 0           Dyspnea: 5     Constipation: No     Stool Occurrence(s): 1        PHYSICAL EXAM:     From RN flowsheet:  Wt Readings from Last 3 Encounters:   04/25/18 60.4 kg (133 lb 2.5 oz)   04/24/18 59.9 kg (132 lb)   04/20/18 64 kg (141 lb 1.5 oz)     Blood pressure 162/67, pulse 88, temperature 96.7 °F (35.9 °C), resp. rate 20, height 5' 1\" (1.549 m), weight 60.4 kg (133 lb 2.5 oz), SpO2 100 %.     Pain Scale 1: Numeric (0 - 10)  Pain Intensity 1: 0  Pain Onset 1: acute  Pain Location 1: Generalized     Pain Description 1: Aching  Pain Intervention(s) 1: Medication (see MAR)  Last bowel movement, if known:     Constitutional: pt resting in ICU on BIPAP NAD       HISTORY:     Principal Problem:    SOB (shortness of breath) (2018)    Active Problems:    Altered mental status, unspecified (2018)      Acute systolic heart failure ([address] (2018)      Past Medical History:   Diagnosis Date    Anemia 2009    Colon cancer ([facility] 2009    surgery/chemo    Colon cancer ([facility] [address] [facility] 2009    DM (diabetes mellitus) ([facility] [address] [facility] 2009    GERD (gastroesophageal reflux disease)     H/O: CVA 2009    slight l sided weakness    Hypothyroid 2009    Ill-defined condition     seasonal allergies    Microalbuminuria 2009    Osteoporosis 2009    Other and unspecified hyperlipidemia 2010    Rheumatoid arthritis involving ankle ([address] 2016    Rheumatoid arthritis(714.0) 2009    Unspecified essential hypertension 2009    Weakness due to cerebrovascular accident       Past Surgical History:   Procedure Laterality Date    HX COLECTOMY      HX HYSTERECTOMY      HX OTHER SURGICAL      colonoscopies numerous since       Family History   Problem Relation Age of Onset    Diabetes Mother     Stroke Mother     Diabetes Sister     Other Sister      fell and hit her head -  of this   Radha Bradford Diabetes Brother     Cancer Father      stomach    Diabetes Sister       History reviewed, no pertinent family history.   Social History   Substance Use Topics    Smoking status: Never Smoker    Smokeless tobacco: Never Used    Alcohol use No     Allergies   Allergen Reactions    Statins-Hmg-Coa Reductase Inhibitors Other (comments)     Intolerant to statins     Sulfa (Sulfonamide Antibiotics) Other (comments)     syncope      Current Facility-Administered Medications   Medication Dose Route Frequency    dexmedeTOMidine (PRECEDEX) 400 mcg in 0.9% sodium chloride 100 mL infusion  0.2-0.7 mcg/kg/hr IntraVENous TITRATE    sodium chloride (NS) flush 5-10 mL  5-10 mL IntraVENous Q8H    sodium chloride (NS) flush 5-10 mL  5-10 mL IntraVENous PRN    lidocaine (XYLOCAINE) 20 mg/mL (2 %) injection 6 mg  0.3 mL Other ONCE    albuterol-ipratropium (DUO-NEB) 2.5 MG-0.5 MG/3 ML  3 mL Nebulization Q4H RT    budesonide (PULMICORT) 500 mcg/2 ml nebulizer suspension  500 mcg Nebulization BID RT    acetylcysteine (MUCOMYST) 200 mg/mL (20 %) solution 200 mg  200 mg Nebulization QID RT    hydrALAZINE (APRESOLINE) 20 mg/mL injection 10 mg  10 mg IntraVENous Q6H PRN    [START ON 4/27/2018] fluconazole in 0.9% NaCl 100 mg IVPB  100 mg IntraVENous Q24H    0.9% sodium chloride infusion 250 mL  250 mL IntraVENous PRN    acetaminophen (TYLENOL) tablet 650 mg  650 mg Oral Q4H PRN    potassium chloride in water 10 mEq/50 mL IVPB        carvedilol (COREG) tablet 6.25 mg  6.25 mg Oral BID    sodium chloride (NS) flush 20 mL  20 mL InterCATHeter PRN    sodium chloride (NS) flush 10 mL  10 mL InterCATHeter Q24H    sodium chloride (NS) flush 10 mL  10 mL InterCATHeter PRN    sodium chloride (NS) flush 10 mL  10 mL InterCATHeter Q8H    alteplase (CATHFLO) 1 mg in sterile water (preservative free) 1 mL injection  1 mg InterCATHeter PRN    bacitracin 500 unit/gram packet 1 Packet  1 Packet Topical PRN    meropenem (MERREM) 500 mg in 0.9% sodium chloride (MBP/ADV) 50 mL  0.5 g IntraVENous Q12H    levoFLOXacin (LEVAQUIN) 500 mg in D5W IVPB  500 mg IntraVENous Q48H    bumetanide (BUMEX) injection 2 mg  2 mg IntraVENous Q12H    sodium chloride (NS) flush 10-30 mL  10-30 mL InterCATHeter PRN    sodium chloride (NS) flush 10 mL  10 mL InterCATHeter Q24H    sodium chloride (NS) flush 10 mL  10 mL InterCATHeter PRN    sodium chloride (NS) flush 10-40 mL  10-40 mL InterCATHeter Q8H    sodium chloride (NS) flush 20 mL  20 mL InterCATHeter Q24H    alteplase (CATHFLO) 1 mg in sterile water (preservative free) 1 mL injection  1 mg InterCATHeter PRN    anastrozole (ARIMIDEX) tablet 1 mg  1 mg Oral DAILY    0.9% sodium chloride infusion 250 mL  250 mL IntraVENous PRN    epoetin celio (EPOGEN;PROCRIT) injection 10,000 Units  10,000 Units SubCUTAneous Q MON, WED & FRI    albuterol-ipratropium (DUO-NEB) 2.5 MG-0.5 MG/3 ML  3 mL Nebulization Q6H PRN    glucose chewable tablet 16 g  4 Tab Oral PRN    dextrose (D50W) injection syrg 12.5-25 g  12.5-25 g IntraVENous PRN    glucagon (GLUCAGEN) injection 1 mg  1 mg IntraMUSCular PRN    insulin lispro (HUMALOG) injection   SubCUTAneous AC&HS    prochlorperazine (COMPAZINE) with saline injection 5 mg  5 mg IntraVENous Q8H PRN    ascorbic acid (vitamin C) (VITAMIN C) tablet 500 mg  500 mg Oral DAILY    cholecalciferol (VITAMIN D3) tablet 1,000 Units  1,000 Units Oral DAILY    folic acid (FOLVITE) tablet 1 mg  1 mg Oral DAILY    magnesium oxide (MAG-OX) tablet 400 mg  400 mg Oral DAILY    therapeutic multivitamin (THERAGRAN) tablet 1 Tab  1 Tab Oral DAILY    fish oil-omega-3 fatty acids 340-1,000 mg capsule 1 Cap  1 Cap Oral DAILY    sodium chloride (NS) flush 5-10 mL  5-10 mL IntraVENous Q8H    sodium chloride (NS) flush 5-10 mL  5-10 mL IntraVENous PRN    ondansetron (ZOFRAN) injection 4 mg  4 mg IntraVENous Q4H PRN    levothyroxine (SYNTHROID) tablet 88 mcg  88 mcg Oral ACB          LAB AND IMAGING FINDINGS:     Lab Results   Component Value Date/Time    WBC 15.5 (H) 04/26/2018 03:48 AM    HGB 6.7 (L) 04/26/2018 03:48 AM    PLATELET 199 47/57/2309 03:48 AM     Lab Results   Component Value Date/Time    Sodium 144 04/26/2018 03:48 AM    Potassium 3.3 (L) 04/26/2018 03:48 AM    Chloride 108 04/26/2018 03:48 AM    CO2 27 04/26/2018 03:48 AM    BUN 63 (H) 04/26/2018 03:48 AM    Creatinine 1.83 (H) 04/26/2018 03:48 AM    Calcium 9.3 04/26/2018 03:48 AM    Magnesium 1.5 (L) 04/26/2018 03:48 AM    Phosphorus 3.5 04/26/2018 03:48 AM      Lab Results Component Value Date/Time    AST (SGOT) 39 (H) 04/24/2018 03:19 AM    Alk. phosphatase 304 (H) 04/24/2018 03:19 AM    Protein, total 7.8 04/24/2018 03:19 AM    Albumin 2.9 (L) 04/24/2018 03:19 AM    Globulin 4.9 (H) 04/24/2018 03:19 AM     Lab Results   Component Value Date/Time    aPTT 32.1 (H) 04/22/2018 06:39 AM      Lab Results   Component Value Date/Time    Iron 12 (L) 04/19/2018 06:09 PM    TIBC 232 (L) 04/19/2018 06:09 PM    Iron % saturation 5 (L) 04/19/2018 06:09 PM    Ferritin 424 (H) 04/19/2018 06:09 PM      No results found for: PH, PCO2, PO2  No components found for: [place]   Lab Results   Component Value Date/Time    CK 41 04/22/2018 07:35 AM    CK - MB 1.2 04/22/2018 07:35 AM                Total time: 50  Counseling / coordination time, spent as noted above: 35  > 50% counseling / coordination?: yes    Prolonged service was provided for  []30 min   []75 min in face to face time in the presence of the patient, spent as noted above. Time Start:   Time End:   Note: this can only be billed with 70991 (initial) or 82137 (follow up). If multiple start / stop times, list each separately.

## 2018-04-24 NOTE — ACP (ADVANCE CARE PLANNING)
Adv Care Plan Note    Patient does not have decision making capacity at this time. Her eldest of 3 sons, Jeff Smith, states that he is mPOA>  Patient has AMD at home, and he was asked to bring copy to hospital for our records. He discussed code status earlier today with attending MD.    Partial code  Pressors, intubation OK    IF her heart stops patient would not want shock, CPR.

## 2018-04-24 NOTE — PROGRESS NOTES
Day #1 of Merrem  Indication: sepsis  Current regimen:  500 mg IV Q 6hr  Abx regimen: levaquin+Merrem  Recent Labs      18   0319  18   0432  18   0413   WBC  20.8*  15.5*  18.4*   CREA  1.86*  1.79*  1.74*  1.53*   BUN  50*  53*  48*  42*     Est CrCl: ~ 20 ml/min; UO: ,1 ml/kg/hr  Temp (24hrs), Av.9 °F (36.6 °C), Min:97.1 °F (36.2 °C), Max:98.3 °F (36.8 °C)    Cultures: blood NGTD    Plan: Change to Merrem 500 mg Q 12 hr per renal dosing protocol

## 2018-04-24 NOTE — PROGRESS NOTES
[facility]   [address] [facility], [facility] [address], [facility]  Phone: [phone]   [phone]       Nephrology Progress Note  Lyndsay Mead     1935     [id number]  Date of Admission : 4/16/2018 04/24/18    CC: Follow up for JEROD        Assessment and Plan   JEROD on CKD   - Initially from Cardiac event/ NSTEMI + hypotension/sepsis  - labile BP + diuresis causing further injury  - place castro now  - agree with nitro drip  - bumex 2mg IV x 1 now    Hypokalemia:  - replete PRN     Labile HTN:  - leading to flash pulm edema  - nitro drip  - diuretics as above  - IV hydralazine x 1 now    Resp Failure:  - likely from pulm edema  - may need transfer to ICU if no improvement with BP control and diuretics    Sepsis w/ Shock :  - per PCCM     UTI : On abx     CKD Stage III :  - baseline Cr 0.9-1 mg/dl lately   - progressing, Cr around 1.5 to 1.7 here     NSTEMI :  - Echo shows EF 35-45%, mod LVH, Severe Hypokinesis of apex and moderate Hypokinesis of anteroseptal wall  - cath plans per Dr Agus Nayak      Acute Systolic CHF: 2/2 MI   - avoid  ACE/ARB     Chronic Pontine Infarcts      ? Brain Mets on MRI and Liver met    hx of colon Ca     Anemia :  - per heme/onc     Interval History:  Seen and examined. Not doing well this AM>  BP elevated again. CXR showing ongoing pulm edema. Lasix 40mg IV given this AM with minimal response. Lethargic with labored breathing. Review of Systems: Pertinent items are noted in HPI.     Current Medications:   Current Facility-Administered Medications   Medication Dose Route Frequency    furosemide (LASIX) injection 40 mg  40 mg IntraVENous ONCE    potassium chloride 10 mEq in 50 ml IVPB  10 mEq IntraVENous Q1H    nitroglycerin (Tridil) 200 mcg/ml infusion   mcg/min IntraVENous TITRATE    metoprolol (LOPRESSOR) injection 5 mg  5 mg IntraVENous Q6H    aspirin (ASA) suppository 300 mg  300 mg Rectal DAILY    nitroglycerin (NITROBID) 2 % ointment 1 Inch 1 Inch Topical Q6H PRN    hydrocortisone Sod Succ (PF) (SOLU-CORTEF) injection 25 mg  25 mg IntraVENous Q8H    sodium chloride (NS) flush 10-30 mL  10-30 mL InterCATHeter PRN    sodium chloride (NS) flush 10 mL  10 mL InterCATHeter Q24H    sodium chloride (NS) flush 10 mL  10 mL InterCATHeter PRN    sodium chloride (NS) flush 10-40 mL  10-40 mL InterCATHeter Q8H    sodium chloride (NS) flush 20 mL  20 mL InterCATHeter Q24H    alteplase (CATHFLO) 1 mg in sterile water (preservative free) 1 mL injection  1 mg InterCATHeter PRN    anastrozole (ARIMIDEX) tablet 1 mg  1 mg Oral DAILY    0.9% sodium chloride infusion 250 mL  250 mL IntraVENous PRN    acetaminophen (TYLENOL) tablet 650 mg  650 mg Oral Q4H PRN    epoetin celio (EPOGEN;PROCRIT) injection 10,000 Units  10,000 Units SubCUTAneous Q MON, WED & FRI    famotidine (PEPCID) tablet 20 mg  20 mg Oral DAILY    albuterol-ipratropium (DUO-NEB) 2.5 MG-0.5 MG/3 ML  3 mL Nebulization Q6H PRN    piperacillin-tazobactam (ZOSYN) 3.375 g in 0.9% sodium chloride (MBP/ADV) 100 mL  3.375 g IntraVENous Q8H    glucose chewable tablet 16 g  4 Tab Oral PRN    dextrose (D50W) injection syrg 12.5-25 g  12.5-25 g IntraVENous PRN    glucagon (GLUCAGEN) injection 1 mg  1 mg IntraMUSCular PRN    insulin lispro (HUMALOG) injection   SubCUTAneous AC&HS    prochlorperazine (COMPAZINE) with saline injection 5 mg  5 mg IntraVENous Q8H PRN    ascorbic acid (vitamin C) (VITAMIN C) tablet 500 mg  500 mg Oral DAILY    cholecalciferol (VITAMIN D3) tablet 1,000 Units  1,000 Units Oral DAILY    clopidogrel (PLAVIX) tablet 75 mg  75 mg Oral DAILY    folic acid (FOLVITE) tablet 1 mg  1 mg Oral DAILY    glimepiride (AMARYL) tablet 1 mg  1 mg Oral 7am    magnesium oxide (MAG-OX) tablet 400 mg  400 mg Oral DAILY    therapeutic multivitamin (THERAGRAN) tablet 1 Tab  1 Tab Oral DAILY    fish oil-omega-3 fatty acids 340-1,000 mg capsule 1 Cap  1 Cap Oral DAILY    SITagliptin (JANUVIA) tablet tab 50 mg  50 mg Oral DAILY    sodium chloride (NS) flush 5-10 mL  5-10 mL IntraVENous Q8H    sodium chloride (NS) flush 5-10 mL  5-10 mL IntraVENous PRN    acetaminophen (TYLENOL) tablet 650 mg  650 mg Oral Q4H PRN    ondansetron (ZOFRAN) injection 4 mg  4 mg IntraVENous Q4H PRN    levothyroxine (SYNTHROID) tablet 88 mcg  88 mcg Oral ACB      Allergies   Allergen Reactions    Statins-Hmg-Coa Reductase Inhibitors Other (comments)     Intolerant to statins     Sulfa (Sulfonamide Antibiotics) Other (comments)     syncope       Objective:  Vitals:    Vitals:    04/24/18 0615 04/24/18 0634 04/24/18 0813 04/24/18 0837   BP:   164/67    Pulse:   (!) 114    Resp:   26    Temp:   97.5 °F (36.4 °C)    SpO2: 100%  100% 100%   Weight:  59.9 kg (132 lb)     Height:         Intake and Output:     04/22 1901 - 04/24 0700  In: 650 [I.V.:650]  Out: 8826 [Urine:1375]    Physical Examination:    General: lethargic  Neck:  Supple, no mass  Resp:  Diminished at bases   CV:  RRR, no murmur  GI:  Soft, NT, + BS  Neurologic:  Non focal     []    High complexity decision making was performed  []    Patient is at high-risk of decompensation with multiple organ involvement    Lab Data Personally Reviewed: I have reviewed all the pertinent labs, microbiology data and radiology studies during assessment.     Recent Labs      04/24/18   0319  04/23/18   1500  04/23/18   0432  04/22/18   0413   NA  138  140   --   141  136   K  3.1*  3.1*  3.1*  2.5*  3.1*   CL  103  104   --   104  102   CO2  23  25   --   26  25   GLU  188*  192*   --   107*  195*   BUN  50*  53*   --   48*  42*   CREA  1.86*  1.79*   --   1.74*  1.53*   CA  10.2*  10.2*   --   9.9  10.3*   MG  1.8   --   1.8   --    PHOS  4.0   --   3.7   --    ALB  2.9*   --    --    --    SGOT  39*   --    --    --    ALT  27   --    --    --      Recent Labs      04/24/18   0319  04/23/18   0432  04/22/18   0413   WBC  20.8*  15.5*  18.4*   HGB  8.2*  7.4* 8.5*   HCT  26.2*  23.0*  26.4*   PLT  422*  331  379     No results found for: SDES  Lab Results   Component Value Date/Time    Culture result: NO GROWTH 1 DAY 04/22/2018 07:35 AM    Culture result: NO GROWTH 2 DAYS 04/22/2018 07:35 AM    Culture result: GRAM NEGATIVE RODS (A) 04/20/2018 04:30 AM    Culture result:  04/20/2018 04:30 AM      PLEASE REFER TO  [id number] FOR FURTHER IDENTIFICATION AND SUSCEPTIBILITIES    Culture result: NO GROWTH 5 DAYS 04/19/2018 08:36 PM    Culture result: NO GROWTH 5 DAYS 04/19/2018 08:36 PM     Recent Results (from the past 24 hour(s))   TYPE + CROSSMATCH    Collection Time: 04/23/18 10:59 AM   Result Value Ref Range    Crossmatch Expiration 04/26/2018     ABO/Rh(D) [place] POSITIVE     Antibody screen NEG     Unit number [id number]     Blood component type RC LR,1     Unit division 00     Status of unit ALLOCATED     Crossmatch result Compatible    GLUCOSE, POC    Collection Time: 04/23/18 11:25 AM   Result Value Ref Range    Glucose (POC) 105 (H) 65 - 100 mg/dL    Performed by Melisa Romero    POTASSIUM    Collection Time: 04/23/18  3:00 PM   Result Value Ref Range    Potassium 3.1 (L) 3.5 - 5.1 mmol/L   GLUCOSE, POC    Collection Time: 04/23/18  4:55 PM   Result Value Ref Range    Glucose (POC) 113 (H) 65 - 100 mg/dL    Performed by Luis A Guy 17, POC    Collection Time: 04/23/18  9:35 PM   Result Value Ref Range    Glucose (POC) 131 (H) 65 - 100 mg/dL    Performed by Anju Mccray   PCT    GLUCOSE, POC    Collection Time: 04/24/18  2:36 AM   Result Value Ref Range    Glucose (POC) 212 (H) 65 - 100 mg/dL    Performed by Tushar JOHNSTON    METABOLIC PANEL, BASIC    Collection Time: 04/24/18  3:19 AM   Result Value Ref Range    Sodium 140 136 - 145 mmol/L    Potassium 3.1 (L) 3.5 - 5.1 mmol/L    Chloride 104 97 - 108 mmol/L    CO2 25 21 - 32 mmol/L    Anion gap 11 5 - 15 mmol/L    Glucose 192 (H) 65 - 100 mg/dL    BUN 53 (H) 6 - 20 MG/DL    Creatinine 1.79 (H) 0.55 - 1.02 MG/DL    BUN/Creatinine ratio 30 (H) 12 - 20      GFR est AA 33 (L) >60 ml/min/1.73m2    GFR est non-AA 27 (L) >60 ml/min/1.73m2    Calcium 10.2 (H) 8.5 - 10.1 MG/DL   CBC WITH AUTOMATED DIFF    Collection Time: 04/24/18  3:19 AM   Result Value Ref Range    WBC 20.8 (H) 3.6 - 11.0 K/uL    RBC 2.92 (L) 3.80 - 5.20 M/uL    HGB 8.2 (L) 11.5 - 16.0 g/dL    HCT 26.2 (L) 35.0 - 47.0 %    MCV 89.7 80.0 - 99.0 FL    MCH 28.1 26.0 - 34.0 PG    MCHC 31.3 30.0 - 36.5 g/dL    RDW 23.2 (H) 11.5 - 14.5 %    PLATELET 235 (H) 744 - 400 K/uL    MPV 9.4 8.9 - 12.9 FL    NRBC 0.5 (H) 0  WBC    ABSOLUTE NRBC 0.11 (H) 0.00 - 0.01 K/uL    NEUTROPHILS 82 (H) 32 - 75 %    LYMPHOCYTES 7 (L) 12 - 49 %    MONOCYTES 10 5 - 13 %    EOSINOPHILS 0 0 - 7 %    BASOPHILS 0 0 - 1 %    METAMYELOCYTES 1 (H) 0 %    IMMATURE GRANULOCYTES 0 %    ABS. NEUTROPHILS 17.1 (H) 1.8 - 8.0 K/UL    ABS. LYMPHOCYTES 1.5 0.8 - 3.5 K/UL    ABS. MONOCYTES 2.1 (H) 0.0 - 1.0 K/UL    ABS. EOSINOPHILS 0.0 0.0 - 0.4 K/UL    ABS. BASOPHILS 0.0 0.0 - 0.1 K/UL    ABS. IMM. GRANS. 0.0 K/UL    DF MANUAL      PLATELET COMMENTS Large Platelets      RBC COMMENTS ANISOCYTOSIS  2+        RBC COMMENTS TARGET CELLS  PRESENT        RBC COMMENTS OVALOCYTES  PRESENT        RBC COMMENTS POLYCHROMASIA  1+        RBC COMMENTS RBC FRAGMENTS  PRESENT       METABOLIC PANEL, COMPREHENSIVE    Collection Time: 04/24/18  3:19 AM   Result Value Ref Range    Sodium 138 136 - 145 mmol/L    Potassium 3.1 (L) 3.5 - 5.1 mmol/L    Chloride 103 97 - 108 mmol/L    CO2 23 21 - 32 mmol/L    Anion gap 12 5 - 15 mmol/L    Glucose 188 (H) 65 - 100 mg/dL    BUN 50 (H) 6 - 20 MG/DL    Creatinine 1.86 (H) 0.55 - 1.02 MG/DL    BUN/Creatinine ratio 27 (H) 12 - 20      GFR est AA 32 (L) >60 ml/min/1.73m2    GFR est non-AA 26 (L) >60 ml/min/1.73m2    Calcium 10.2 (H) 8.5 - 10.1 MG/DL    Bilirubin, total 0.6 0.2 - 1.0 MG/DL    ALT (SGPT) 27 12 - 78 U/L    AST (SGOT) 39 (H) 15 - 37 U/L    Alk.  phosphatase 304 (H) 45 - 117 U/L    Protein, total 7.8 6.4 - 8.2 g/dL    Albumin 2.9 (L) 3.5 - 5.0 g/dL    Globulin 4.9 (H) 2.0 - 4.0 g/dL    A-G Ratio 0.6 (L) 1.1 - 2.2     MAGNESIUM    Collection Time: 04/24/18  3:19 AM   Result Value Ref Range    Magnesium 1.8 1.6 - 2.4 mg/dL   PHOSPHORUS    Collection Time: 04/24/18  3:19 AM   Result Value Ref Range    Phosphorus 4.0 2.6 - 4.7 MG/DL   TROPONIN I    Collection Time: 04/24/18  3:19 AM   Result Value Ref Range    Troponin-I, Qt. 1.88 (H) <0.05 ng/mL   EKG, 12 LEAD, INITIAL    Collection Time: 04/24/18  3:24 AM   Result Value Ref Range    Ventricular Rate 119 BPM    Atrial Rate 119 BPM    P-R Interval 134 ms    QRS Duration 96 ms    Q-T Interval 326 ms    QTC Calculation (Bezet) 458 ms    Calculated P Axis 57 degrees    Calculated R Axis 79 degrees    Calculated T Axis -130 degrees    Diagnosis       Sinus tachycardia  Septal infarct (cited on or before 24-APR-2018)  Marked ST abnormality, possible inferior subendocardial injury  When compared with ECG of 22-APR-2018 05:16,  Serial changes of evolving Septal infarct present     POC EG7    Collection Time: 04/24/18  3:31 AM   Result Value Ref Range    Calcium, ionized (POC) 1.41 (H) 1.12 - 1.32 mmol/L    pH (POC) 7.365 7.35 - 7.45      pCO2 (POC) 46.3 (H) 35.0 - 45.0 MMHG    pO2 (POC) 94 80 - 100 MMHG    HCO3 (POC) 26.5 (H) 22 - 26 MMOL/L    Base excess (POC) 1 mmol/L    sO2 (POC) 97 92 - 97 %    Site LEFT RADIAL      Device: ROOM AIR      Allens test (POC) YES      Specimen type (POC) ARTERIAL     GLUCOSE, POC    Collection Time: 04/24/18  6:02 AM   Result Value Ref Range    Glucose (POC) 212 (H) 65 - 100 mg/dL    Performed by Gisela Beach        I have reviewed the flowsheets. Chart and Pertinent Notes have been reviewed. No change in PMH ,family and social history from Consult note.       Frances Whitten MD

## 2018-04-24 NOTE — PROGRESS NOTES
Problem: Falls - Risk of  Goal: *Absence of Falls  Document Yaron Fall Risk and appropriate interventions in the flowsheet.    Outcome: Progressing Towards Goal  Fall Risk Interventions:  Mobility Interventions: Communicate number of staff needed for ambulation/transfer, Patient to call before getting OOB, PT Consult for mobility concerns    Mentation Interventions: Adequate sleep, hydration, pain control, Family/sitter at bedside, Reorient patient, More frequent rounding    Medication Interventions: Evaluate medications/consider consulting pharmacy, Patient to call before getting OOB, Teach patient to arise slowly    Elimination Interventions: Call light in reach, Patient to call for help with toileting needs, Toileting schedule/hourly rounds    History of Falls Interventions: Door open when patient unattended, Evaluate medications/consider consulting pharmacy

## 2018-04-24 NOTE — CONSULTS
[address], [address] [place] [address] Avkranthi  [phone]        Thank you for requesting this consultation but this patient has been seen by Dr. Amanda Torres in the past.  We informed him of this request.      Sincerely,  JEAN PAUL Fuentes  Agree with above  Barbara Kramer

## 2018-04-24 NOTE — PROGRESS NOTES
Breast surgical oncology    Ms. Ledesma overnight has had worsening shortness of breath and is fatigued. Her son is at the bedside. Yesterday she had a core biopsy of the left breast.       Date 04/23/18 0700 - 04/24/18 0659 04/24/18 0700 - 04/25/18 0659   Shift [id number] 1900-0659 24 Hour Total 5850-6744 9625-1288 24 Hour Total   I  N  T  A  K  E   I.V.  (mL/kg/hr) 250  (0.4) 100  (0.1) 350  (0.2)         Trandate Volume 0  0         Volume (piperacillin-tazobactam (ZOSYN) 3.375 g in 0.9% sodium chloride (MBP/ADV) 100 mL) 100 100 200         Volume (potassium chloride 10 mEq in 50 ml IVPB) 150  150       Shift Total  (mL/kg) 250  (4.2) 100  (1.7) 350  (5.8)      O  U  T  P  U  T   Urine  (mL/kg/hr) 500  (0.7)  500  (0.3)         Urine Output (mL) ([REMOVED] Urinary Catheter 04/20/18 [place]) 500  500       Shift Total  (mL/kg) 500  (8.4)  500  (8.4)      NET -250 100 -150      Weight (kg) 59.3 59.9 59.9 59.9 59.9 59.9     Visit Vitals    /84 (BP 1 Location: Left arm, BP Patient Position: At rest;Supine)    Pulse (!) 114    Temp 97.5 °F (36.4 °C)    Resp 26    Ht 5' 1\" (1.549 m)    Wt 132 lb (59.9 kg)    SpO2 100%    BMI 24.94 kg/m2     Exam: ace wrap removed from chest. Left breast with clean dressing. No hematoma or bruising. A/p  1. S/p core needle biopsy left breast- no hematoma or bleeding. 2. Dyspnea, pulmonary edema, htn- primary team transferring to icu at this time. Her treatment for her metastatic breast cancer will be systemic. Not a candidate for mastectomy. Biopsy results pending. No evidence bleeding from biopsy. Will defer to medical oncology for care.

## 2018-04-24 NOTE — PROGRESS NOTES
Pulmonary, Critical Care, and Sleep Medicine~Progress Note    Name: Sumit Diaz MRN: [id number]   : 1935 Hospital: [facility]   Date: 2018 11:37 AM Admission: 2018      Impression Plan   1. Metastatic breast cancer- new dx- left breast mass on MRI with diffuse skeletal mets. Follows with Dr. Sara Childress  2. Acute hypercarbic/hypoxic resp failure-pCO2 ok range  3. Altered mental status  4. NSTEMI - ECHO EF 40%  5. JEROD on CKD  6. GERD  7. E Coli UTI- sens pip/tazo 1. O2 titration above 90%  2. Hemodynamically stable  3. diuresis for CHF  4. BP control per cards   5. On IV stress dose steroids; leukocytosis induced? 6. On vanc/zosyn   7. MRI ordered by neurology   8. Worsened mental status overnight. Supporting her airway currently. CVA? Metastatic disease? Definitely avoid benzos  9. Discussed with son, hospitalist, attending  10. Noted transition to ICU  11. Patient followed by multiple specialties      Daily Progression:    Worsened mental status overnight    I have reviewed the labs and previous days notes. Review of systems not obtained due to patient factors.     OBJECTIVE:     Vital Signs:       Visit Vitals    /65    Pulse 95    Temp 97.5 °F (36.4 °C)    Resp 26    Ht 5' 1\" (1.549 m)    Wt 59.9 kg (132 lb)    SpO2 100%    BMI 24.94 kg/m2      Temp (24hrs), Av.9 °F (36.6 °C), Min:97.1 °F (36.2 °C), Max:98.7 °F (37.1 °C)     Intake/Output:     Last shift:      Last 3 shifts:  1901 -  0700  In: 650 [I.V.:650]  Out: 1375 [Urine:1375]        Intake/Output Summary (Last 24 hours) at 18 1137  Last data filed at 18 0048   Gross per 24 hour   Intake              350 ml   Output              100 ml   Net              250 ml       Physical Exam:                                        Exam Findings Other   General: No resp distress noted, appears stated age    [de-identified]:  No ulcers, JVD not elevated, no cervical LAD    Chest: No pectus deformity, normal chest rise b/l    HEART:  RRR, no murmurs/rubs/gallops    Lungs:  CTA b/l, no rhonchi/crackles/wheeze, diminished BS at bases    ABD: Soft/NT, non rigid mildly distended    EXT: No cyanosis/clubbing/edema, normal peripheral pulses    Skin: No rashes or ulcers, no mottling    Neuro: Opens eyes when stimulated        Medications:  Current Facility-Administered Medications   Medication Dose Route Frequency    nitroglycerin (Tridil) 200 mcg/ml infusion   mcg/min IntraVENous TITRATE    metoprolol (LOPRESSOR) injection 5 mg  5 mg IntraVENous Q6H    aspirin (ASA) suppository 300 mg  300 mg Rectal DAILY    hydrocortisone Sod Succ (PF) (SOLU-CORTEF) injection 25 mg  25 mg IntraVENous Q8H    sodium chloride (NS) flush 10-30 mL  10-30 mL InterCATHeter PRN    sodium chloride (NS) flush 10 mL  10 mL InterCATHeter Q24H    sodium chloride (NS) flush 10 mL  10 mL InterCATHeter PRN    sodium chloride (NS) flush 10-40 mL  10-40 mL InterCATHeter Q8H    sodium chloride (NS) flush 20 mL  20 mL InterCATHeter Q24H    alteplase (CATHFLO) 1 mg in sterile water (preservative free) 1 mL injection  1 mg InterCATHeter PRN    anastrozole (ARIMIDEX) tablet 1 mg  1 mg Oral DAILY    0.9% sodium chloride infusion 250 mL  250 mL IntraVENous PRN    acetaminophen (TYLENOL) tablet 650 mg  650 mg Oral Q4H PRN    epoetin celio (EPOGEN;PROCRIT) injection 10,000 Units  10,000 Units SubCUTAneous Q MON, WED & FRI    famotidine (PEPCID) tablet 20 mg  20 mg Oral DAILY    albuterol-ipratropium (DUO-NEB) 2.5 MG-0.5 MG/3 ML  3 mL Nebulization Q6H PRN    piperacillin-tazobactam (ZOSYN) 3.375 g in 0.9% sodium chloride (MBP/ADV) 100 mL  3.375 g IntraVENous Q8H    glucose chewable tablet 16 g  4 Tab Oral PRN    dextrose (D50W) injection syrg 12.5-25 g  12.5-25 g IntraVENous PRN    glucagon (GLUCAGEN) injection 1 mg  1 mg IntraMUSCular PRN    insulin lispro (HUMALOG) injection SubCUTAneous AC&HS    prochlorperazine (COMPAZINE) with saline injection 5 mg  5 mg IntraVENous Q8H PRN    ascorbic acid (vitamin C) (VITAMIN C) tablet 500 mg  500 mg Oral DAILY    cholecalciferol (VITAMIN D3) tablet 1,000 Units  1,000 Units Oral DAILY    clopidogrel (PLAVIX) tablet 75 mg  75 mg Oral DAILY    folic acid (FOLVITE) tablet 1 mg  1 mg Oral DAILY    magnesium oxide (MAG-OX) tablet 400 mg  400 mg Oral DAILY    therapeutic multivitamin (THERAGRAN) tablet 1 Tab  1 Tab Oral DAILY    fish oil-omega-3 fatty acids 340-1,000 mg capsule 1 Cap  1 Cap Oral DAILY    sodium chloride (NS) flush 5-10 mL  5-10 mL IntraVENous Q8H    sodium chloride (NS) flush 5-10 mL  5-10 mL IntraVENous PRN    acetaminophen (TYLENOL) tablet 650 mg  650 mg Oral Q4H PRN    ondansetron (ZOFRAN) injection 4 mg  4 mg IntraVENous Q4H PRN    levothyroxine (SYNTHROID) tablet 88 mcg  88 mcg Oral ACB       Labs:  ABG Recent Labs      04/24/18   0331  04/22/18   0756   PHI  7.365  7.242*   PCO2I  46.3*  56.4*   PO2I  94  75*   HCO3I  26.5*  24.3   SO2I  97  92   FIO2I   --   0.24        CBC Recent Labs      04/24/18 0319 04/23/18   0432  04/22/18   0413   WBC  20.8*  15.5*  18.4*   HGB  8.2*  7.4*  8.5*   HCT  26.2*  23.0*  26.4*   PLT  422*  331  379   MCV  89.7  87.5  88.3   MCH  28.1  28.1  72.7        Metabolic  Panel Recent Labs      04/24/18 0319 04/23/18   1500  04/23/18   0432  04/22/18   0413   NA  138  140   --   141  136   K  3.1*  3.1*  3.1*  2.5*  3.1*   CL  103  104   --   104  102   CO2  23  25   --   26  25   GLU  188*  192*   --   107*  195*   BUN  50*  53*   --   48*  42*   CREA  1.86*  1.79*   --   1.74*  1.53*   CA  10.2*  10.2*   --   9.9  10.3*   MG  1.8   --   1.8   --    PHOS  4.0   --   3.7   --    ALB  2.9*   --    --    --    SGOT  39*   --    --    --    ALT  27   --    --    --         Pertinent Labs                JEAN PAUL Rios  4/24/2018

## 2018-04-24 NOTE — PROGRESS NOTES
Day #1 of levaquin  Indication: sepsis  Current regimen:750 mg IV Q 24 hrr  Abx regimen: levaquin+Merrem  Recent Labs      18   0319  18   0432  18   0413   WBC  20.8*  15.5*  18.4*   CREA  1.86*  1.79*  1.74*  1.53*   BUN  50*  53*  48*  42*     Est CrCl: ~ 20 ml/min; UO: ,1 ml/kg/hr  Temp (24hrs), Av.9 °F (36.6 °C), Min:97.1 °F (36.2 °C), Max:98.3 °F (36.8 °C)    Cultures: blood NGTD    Plan: Change to levaquin 750 mg day 1, then 500 mg IV Q 48 hr

## 2018-04-24 NOTE — PROGRESS NOTES
Problem: Sepsis: Day 6  Goal: *Oxygen saturation within defined limits  Outcome: Progressing Towards Goal  Assess oxygen saturation  Goal: Medications  Outcome: Progressing Towards Goal  Administer antibiotics as ordered  Goal: Respiratory  Outcome: Progressing Towards Goal  Assess patients respiratory status  Assess patients respiratory rate, oxygen saturation, breath sounds, breathing pattern  Administer oxygen as ordered/needed

## 2018-04-24 NOTE — CONSULTS
Neurology Progress Note    Patient ID:  Lyndsay Mead  [id number]  82 y.o.  1935    Chief Complaint: Confusion    Subjective:     61-year-old woman transferred from the ICU to the floor yesterday. I have been seeing her for concerns of worsening confusion in the setting of metastatic disease. Overnight there was more agitation and she received Ativan. Respiratory status remains tenuous. This morning on my evaluation she is in respiratory distress and is in the process of being moved to the ICU. Her son is present and we discussed her case. He does note that her confusion has been worsening and he thinks this is due to her fluctuating respiratory status.     Objective:       ROS:  Cannot obtain secondary to patient medical condition      Meds:  Current Facility-Administered Medications   Medication Dose Route Frequency    nitroglycerin (Tridil) 200 mcg/ml infusion   mcg/min IntraVENous TITRATE    metoprolol (LOPRESSOR) injection 5 mg  5 mg IntraVENous Q6H    aspirin (ASA) suppository 300 mg  300 mg Rectal DAILY    nitroglycerin (NITROBID) 2 % ointment 1 Inch  1 Inch Topical Q6H PRN    hydrocortisone Sod Succ (PF) (SOLU-CORTEF) injection 25 mg  25 mg IntraVENous Q8H    sodium chloride (NS) flush 10-30 mL  10-30 mL InterCATHeter PRN    sodium chloride (NS) flush 10 mL  10 mL InterCATHeter Q24H    sodium chloride (NS) flush 10 mL  10 mL InterCATHeter PRN    sodium chloride (NS) flush 10-40 mL  10-40 mL InterCATHeter Q8H    sodium chloride (NS) flush 20 mL  20 mL InterCATHeter Q24H    alteplase (CATHFLO) 1 mg in sterile water (preservative free) 1 mL injection  1 mg InterCATHeter PRN    anastrozole (ARIMIDEX) tablet 1 mg  1 mg Oral DAILY    0.9% sodium chloride infusion 250 mL  250 mL IntraVENous PRN    acetaminophen (TYLENOL) tablet 650 mg  650 mg Oral Q4H PRN    epoetin celio (EPOGEN;PROCRIT) injection 10,000 Units  10,000 Units SubCUTAneous Q MON, WED & FRI    famotidine (PEPCID) tablet 20 mg  20 mg Oral DAILY    albuterol-ipratropium (DUO-NEB) 2.5 MG-0.5 MG/3 ML  3 mL Nebulization Q6H PRN    piperacillin-tazobactam (ZOSYN) 3.375 g in 0.9% sodium chloride (MBP/ADV) 100 mL  3.375 g IntraVENous Q8H    glucose chewable tablet 16 g  4 Tab Oral PRN    dextrose (D50W) injection syrg 12.5-25 g  12.5-25 g IntraVENous PRN    glucagon (GLUCAGEN) injection 1 mg  1 mg IntraMUSCular PRN    insulin lispro (HUMALOG) injection   SubCUTAneous AC&HS    prochlorperazine (COMPAZINE) with saline injection 5 mg  5 mg IntraVENous Q8H PRN    ascorbic acid (vitamin C) (VITAMIN C) tablet 500 mg  500 mg Oral DAILY    cholecalciferol (VITAMIN D3) tablet 1,000 Units  1,000 Units Oral DAILY    clopidogrel (PLAVIX) tablet 75 mg  75 mg Oral DAILY    folic acid (FOLVITE) tablet 1 mg  1 mg Oral DAILY    glimepiride (AMARYL) tablet 1 mg  1 mg Oral 7am    magnesium oxide (MAG-OX) tablet 400 mg  400 mg Oral DAILY    therapeutic multivitamin (THERAGRAN) tablet 1 Tab  1 Tab Oral DAILY    fish oil-omega-3 fatty acids 340-1,000 mg capsule 1 Cap  1 Cap Oral DAILY    SITagliptin (JANUVIA) tablet tab 50 mg  50 mg Oral DAILY    sodium chloride (NS) flush 5-10 mL  5-10 mL IntraVENous Q8H    sodium chloride (NS) flush 5-10 mL  5-10 mL IntraVENous PRN    acetaminophen (TYLENOL) tablet 650 mg  650 mg Oral Q4H PRN    ondansetron (ZOFRAN) injection 4 mg  4 mg IntraVENous Q4H PRN    levothyroxine (SYNTHROID) tablet 88 mcg  88 mcg Oral ACB       MRI Results (maximum last 3): Results from [place] encounter on 04/16/18   MRI ABD W WO CONT   Narrative MRI ABDOMEN AND MRCP WITH AND WITHOUT CONTRAST. INDICATION: Liver lesion on previous imaging    COMPARISON: None.     TECHNIQUE: Multisequence and multiplanar MRI of the abdomen was performed after  the administration of 10 mL of MultiHance gadolinium IV contrast. Images were  obtained without contrast; and in late arterial, portal venous, and delayed  phases after the administration of contrast. Subtraction images were  reconstructed. Heavily T2-weighted thick slab and thin slice images were obtained in the  oblique coronal plane through the biliary tree (MRCP). FINDINGS:     MRCP: No pancreatic or biliary ductal dilation. No stricture or  choledocholithiasis. LIVER: Suboptimal postcontrast imaging secondary to motion. 2 subcentimeter T2  hyperintense foci are noted in the right lobe, most likely representing tiny  cysts. There are multiple suspicious lesions seen on the diffusion weighted  sequences in the right and left hepatic lobes. The largest of these measures  approximately 16mm on series 13 image 51. Many of these have subtle associated  T2 hyperintense signal. Metastatic disease is suspected. GALLBLADDER: Contracted. No gallstones. PANCREAS: Normal.  SPLEEN: Normal.  ADRENALS: Normal.  KIDNEYS: Normal.  DISTAL ESOPHAGUS: Normal.  STOMACH AND DUODENUM: Normal.  VISUALIZED SMALL BOWEL AND COLON: Normal.  PERITONEUM: No free fluid and no abdominal lymphadenopathy. VISUALIZED LUNG BASES: Small bilateral pleural effusions are noted with  associated atelectasis/consolidation in both lower lobes. BONES: Widespread osseous metastases are noted. CHEST WALL: There is a spiculated mass in the central aspect of the left breast  measuring 3.5 x 3.2 x 2.8 cm. A smaller discrete mass measuring 0.9 x 1.3 cm  seen medially in the left breast. Multiple other areas of concerning areas of  enhancement are noted in the left breast, not well assessed on the current  study. Impression IMPRESSION:   1. Large, irregular spiculated mass in the left breast. Adjacent smaller mass  also noted, as well as multiple other areas of suspicious enhancement, which are  not well assessed on the current study. Findings are highly suggestive of a  primary breast malignancy. 2. Suboptimal postcontrast imaging secondary to motion.  Diffusion-weighted  imaging demonstrates multiple lesions in the liver. Metastatic disease is  suspected. Widespread osseous metastases are also noted. Although these findings  could be related to patient's known colon cancer, metastatic breast cancer is  more likely. 3. Small bilateral pleural effusions with associated collapse/consolidation in  the lower lobes. Results from [facility] encounter on 03/19/18   MRI BRAIN W WO CONT   Narrative EXAM: MRI BRAIN W WO CONT    TECHNIQUE: Sagittal and axial T1-weighted images axial FLAIR, T2-weighted,  diffusion weighted, precontrast,  Axial thin section cranial nerve cisternal  images, thin section axial and coronal T1-weighted and fat-suppressed  postcontrast T1-weighted images of the skull base, axial postcontrast images of  the head      IV CONTRAST:  11 cc ProHance    CLINICAL INFORMATION:  Sensorineural hearing loss, bilateral, Benign paroxysmal  vertigo, left ear    COMPARISON:  CT head of 2/9/2018    FINDINGS:  EXTRA-AXIAL SPACES: Prominent cortical sulci, consistent with cerebral volume  loss. Prominence of the sylvian fissures and basal cisterns. INTRACRANIAL HEMORRHAGE: None. VENTRICULAR SYSTEM:  Normal in size and morphology for the patient's age. BASAL CISTERNS:  Normal.  CEREBRAL PARENCHYMA:  Extensive scattered and confluent foci of FLAIR/T2  hyperintensity in the cerebral white matter, most likely due to intracranial  small vessel disease. No enhancing lesions. No evidence of diffusion  restriction. MIDLINE SHIFT: None. CEREBELLUM:  Inferior vermian hypoplasia. Mild volume loss. BRAINSTEM:  Diffusely atrophic. Chronic bilateral pontine lacunar infarcts,  right more extensive than left. CALVARIUM:    1. Multiple scattered T1 hypointense foci in the calvarium, suspicious for  metastatic disease. Not identified as destructive lesions on recent head CT. VASCULAR SYSTEM:  Normal flow voids. PARANASAL SINUSES AND MASTOID AIR CELLS:  Clear.    VISUALIZED ORBITS:  Previous bilateral cataract surgery. VISUALIZED UPPER CERVICAL SPINE:  Normal.  SELLA:  Normal.  SKULL BASE:  Normal. No cranial nerve thickening or abnormal enhancement, but  some images compromised by patient motion. Impression IMPRESSION:    1. No acute findings. No evidence of vestibular schwannoma. 2. Extensive cerebral white matter signal abnormality and chronic pontine  infarcts, most likely due to chronic small vessel ischemic change. Cerebral  volume loss and prominent brainstem volume loss. 3. Areas of focal abnormality within the calvarium bilaterally, of concern for  metastatic disease. Correlation with radionuclide bone scan recommended.     23X              Lab Review   Recent Results (from the past 24 hour(s))   GLUCOSE, POC    Collection Time: 04/23/18 11:25 AM   Result Value Ref Range    Glucose (POC) 105 (H) 65 - 100 mg/dL    Performed by Shirlee Osgood    POTASSIUM    Collection Time: 04/23/18  3:00 PM   Result Value Ref Range    Potassium 3.1 (L) 3.5 - 5.1 mmol/L   GLUCOSE, POC    Collection Time: 04/23/18  4:55 PM   Result Value Ref Range    Glucose (POC) 113 (H) 65 - 100 mg/dL    Performed by Mlevin Dooley    GLUCOSE, POC    Collection Time: 04/23/18  9:35 PM   Result Value Ref Range    Glucose (POC) 131 (H) 65 - 100 mg/dL    Performed by [facility]    GLUCOSE, POC    Collection Time: 04/24/18  2:36 AM   Result Value Ref Range    Glucose (POC) 212 (H) 65 - 100 mg/dL    Performed by [address], BASIC    Collection Time: 04/24/18  3:19 AM   Result Value Ref Range    Sodium 140 136 - 145 mmol/L    Potassium 3.1 (L) 3.5 - 5.1 mmol/L    Chloride 104 97 - 108 mmol/L    CO2 25 21 - 32 mmol/L    Anion gap 11 5 - 15 mmol/L    Glucose 192 (H) 65 - 100 mg/dL    BUN 53 (H) 6 - 20 MG/DL    Creatinine 1.79 (H) 0.55 - 1.02 MG/DL    BUN/Creatinine ratio 30 (H) 12 - 20      GFR est AA 33 (L) >60 ml/min/1.73m2    GFR est non-AA 27 (L) >60 ml/min/1.73m2    Calcium 10.2 (H) 8.5 - 10.1 MG/DL CBC WITH AUTOMATED DIFF    Collection Time: 04/24/18  3:19 AM   Result Value Ref Range    WBC 20.8 (H) 3.6 - 11.0 K/uL    RBC 2.92 (L) 3.80 - 5.20 M/uL    HGB 8.2 (L) 11.5 - 16.0 g/dL    HCT 26.2 (L) 35.0 - 47.0 %    MCV 89.7 80.0 - 99.0 FL    MCH 28.1 26.0 - 34.0 PG    MCHC 31.3 30.0 - 36.5 g/dL    RDW 23.2 (H) 11.5 - 14.5 %    PLATELET 475 (H) 532 - 400 K/uL    MPV 9.4 8.9 - 12.9 FL    NRBC 0.5 (H) 0  WBC    ABSOLUTE NRBC 0.11 (H) 0.00 - 0.01 K/uL    NEUTROPHILS 82 (H) 32 - 75 %    LYMPHOCYTES 7 (L) 12 - 49 %    MONOCYTES 10 5 - 13 %    EOSINOPHILS 0 0 - 7 %    BASOPHILS 0 0 - 1 %    METAMYELOCYTES 1 (H) 0 %    IMMATURE GRANULOCYTES 0 %    ABS. NEUTROPHILS 17.1 (H) 1.8 - 8.0 K/UL    ABS. LYMPHOCYTES 1.5 0.8 - 3.5 K/UL    ABS. MONOCYTES 2.1 (H) 0.0 - 1.0 K/UL    ABS. EOSINOPHILS 0.0 0.0 - 0.4 K/UL    ABS. BASOPHILS 0.0 0.0 - 0.1 K/UL    ABS. IMM. GRANS. 0.0 K/UL    DF MANUAL      PLATELET COMMENTS Large Platelets      RBC COMMENTS ANISOCYTOSIS  2+        RBC COMMENTS TARGET CELLS  PRESENT        RBC COMMENTS OVALOCYTES  PRESENT        RBC COMMENTS POLYCHROMASIA  1+        RBC COMMENTS RBC FRAGMENTS  PRESENT       METABOLIC PANEL, COMPREHENSIVE    Collection Time: 04/24/18  3:19 AM   Result Value Ref Range    Sodium 138 136 - 145 mmol/L    Potassium 3.1 (L) 3.5 - 5.1 mmol/L    Chloride 103 97 - 108 mmol/L    CO2 23 21 - 32 mmol/L    Anion gap 12 5 - 15 mmol/L    Glucose 188 (H) 65 - 100 mg/dL    BUN 50 (H) 6 - 20 MG/DL    Creatinine 1.86 (H) 0.55 - 1.02 MG/DL    BUN/Creatinine ratio 27 (H) 12 - 20      GFR est AA 32 (L) >60 ml/min/1.73m2    GFR est non-AA 26 (L) >60 ml/min/1.73m2    Calcium 10.2 (H) 8.5 - 10.1 MG/DL    Bilirubin, total 0.6 0.2 - 1.0 MG/DL    ALT (SGPT) 27 12 - 78 U/L    AST (SGOT) 39 (H) 15 - 37 U/L    Alk.  phosphatase 304 (H) 45 - 117 U/L    Protein, total 7.8 6.4 - 8.2 g/dL    Albumin 2.9 (L) 3.5 - 5.0 g/dL    Globulin 4.9 (H) 2.0 - 4.0 g/dL    A-G Ratio 0.6 (L) 1.1 - 2.2     MAGNESIUM Collection Time: 04/24/18  3:19 AM   Result Value Ref Range    Magnesium 1.8 1.6 - 2.4 mg/dL   PHOSPHORUS    Collection Time: 04/24/18  3:19 AM   Result Value Ref Range    Phosphorus 4.0 2.6 - 4.7 MG/DL   TROPONIN I    Collection Time: 04/24/18  3:19 AM   Result Value Ref Range    Troponin-I, Qt. 1.88 (H) <0.05 ng/mL   EKG, 12 LEAD, INITIAL    Collection Time: 04/24/18  3:24 AM   Result Value Ref Range    Ventricular Rate 119 BPM    Atrial Rate 119 BPM    P-R Interval 134 ms    QRS Duration 96 ms    Q-T Interval 326 ms    QTC Calculation (Bezet) 458 ms    Calculated P Axis 57 degrees    Calculated R Axis 79 degrees    Calculated T Axis -130 degrees    Diagnosis       Sinus tachycardia  Septal infarct (cited on or before 24-APR-2018)  Marked ST abnormality, possible inferior subendocardial injury  When compared with ECG of 22-APR-2018 05:16,  Serial changes of evolving Septal infarct present     POC EG7    Collection Time: 04/24/18  3:31 AM   Result Value Ref Range    Calcium, ionized (POC) 1.41 (H) 1.12 - 1.32 mmol/L    pH (POC) 7.365 7.35 - 7.45      pCO2 (POC) 46.3 (H) 35.0 - 45.0 MMHG    pO2 (POC) 94 80 - 100 MMHG    HCO3 (POC) 26.5 (H) 22 - 26 MMOL/L    Base excess (POC) 1 mmol/L    sO2 (POC) 97 92 - 97 %    Site LEFT RADIAL      Device: ROOM AIR      Allens test (POC) YES      Specimen type (POC) ARTERIAL     GLUCOSE, POC    Collection Time: 04/24/18  6:02 AM   Result Value Ref Range    Glucose (POC) 212 (H) 65 - 100 mg/dL    Performed by Janell Goldmann        Additional comments:I reviewed the patient's new clinical lab test results.      Patient Vitals for the past 8 hrs:   BP Temp Pulse Resp SpO2 Weight   04/24/18 1111 178/71 - (!) 112 - - -   04/24/18 1101 175/84 - - - - -   04/24/18 1048 191/86 - (!) 114 - - -   04/24/18 1013 (!) 175/98 - (!) 115 - - -   04/24/18 1010 (!) 175/98 - - - - -   04/24/18 0925 169/87 - (!) 112 - - -   04/24/18 0837 - - - - 100 % -   04/24/18 0813 164/67 97.5 °F (36.4 °C) (!) 114 26 100 % -   04/24/18 0634 - - - - - 59.9 kg (132 lb)   04/24/18 0615 - - - - 100 % -   04/24/18 0607 166/66 - (!) 110 - - -   04/24/18 0508 177/88 98.1 °F (36.7 °C) (!) 119 28 100 % -          04/22 1901 - 04/24 0700  In: 650 [I.V.:650]  Out: 1375 [Urine:1375]    Exam:  Visit Vitals    /71    Pulse (!) 112    Temp 97.5 °F (36.4 °C)    Resp 26    Ht 5' 1\" (1.549 m)    Wt 59.9 kg (132 lb)    SpO2 100%    BMI 24.94 kg/m2     Gen: Well developed  CV: RRR  Lungs: +labored breathing  Abd: soft, non distended  Neuro: She is awake but seems very obtunded and not following commands.   CN II-XII: PERRL,  face grossly symmetric  Motor: Minimal movement of the extremities  Sensory: Unreliable testing  DTRs: symmetric  COOR: no limb/truncal ataxia  Gait: Deferred due to condition    PROBLEM LIST:     Patient Active Problem List   Diagnosis Code    [facility] E11.9    Hypothyroid E03.9    Colon cancer ([facility] C18.9    H/O: CVA     Microalbuminuria R80.9    Age-related osteoporosis without current pathological fracture M81.0    Essential hypertension I10    Anemia D64.9    Hyperlipidemia E78.5    Carotid bruit R09.89    Claudication (HCC) I73.9    Weakness of left arm R29.898    PAD (peripheral artery disease) ([facility] I73.9    Weakness due to cerebrovascular accident [id number]    Neuropathy in diabetes ([facility] E11.40    Occlusion and stenosis of carotid artery without mention of cerebral infarction I65.29    Seropositive rheumatoid arthritis of multiple sites ([facility] M05.79    Primary osteoarthritis of both knees M17.0    Long-term use of immunosuppressant medication Z79.899    Statin intolerance Z78.9    Asymptomatic hyperuricemia E79.0    Vitamin D deficiency E55.9    CKD (chronic kidney disease) stage 3, GFR 30-59 ml/min N18.3    SOB (shortness of breath) R06.02    Altered mental status, unspecified R41.82       Assessment/Plan:      53-year-old woman critically ill with metastatic cancer. She was agitated overnight and received Ativan which in the setting of pre-existing respiratory disease/distress could be causing her to be obtunded. She is being transferred to higher level of care. I discussed my assessment with the son. Obtaining an MRI brain would be appropriate at some point when possible. Not priority at this time. Frequent gentle redirection. Avoid benzos. Following along.         Signed:  Francis Flight, DO  4/24/2018  11:17 AM

## 2018-04-24 NOTE — PROGRESS NOTES
Spiritual Care Assessment/Progress Note  [facility]      NAME: Krissy Lee      MRN: [id number]  AGE: 82 y.o. SEX: female  Lutheran Affiliation: Other   Language: English     4/24/2018     Total Time (in minutes): 5     Spiritual Assessment begun in [facility] 7S1 INTENSIVE CARE through conversation with:         []Patient        [] Family    [] Friend(s)        Reason for Consult: Palliative Care, Initial/Spiritual Assessment     Spiritual beliefs: (Please include comment if needed)     [] Involved in a uzma tradition/spiritual practice:     [] Supported by a uzma community:      [] Claims no spiritual orientation:      [] Seeking spiritual identity:           [] Adheres to an individual form of spirituality:      [x] Not able to assess: On bipap/did not alert                    Identified resources for coping:      [] Prayer                  [] Devotional reading               [] Music                  [] Guided Imagery     [] Family/friends                 [] Pet visits     [] Other:        Interventions offered during this visit: (See comments for more details)    Patient Interventions: Initial visit           Plan of Care:     [] Discuss Spiritual/Cultural needs    [] Support AMD and/or advance care planning process      [] Support grieving process   [] Coordinate Rites/Rituals    [] Coordination with community clergy   [x] No spiritual needs identified at this time   [] Detailed Plan of Care below (See Comments)  [] Make referral to Music Therapy  [] Make referral to Pet Therapy     [] Make referral to Addiction services  [] Make referral to [facility]  [] Make referral to Spiritual Care Partner  [] No future visits requested             Attempted to visit pt in ICU. Pt on bipap and did not alert to her name. No family present. Chaplains will follow as needed.   Chaplain Campoverde MDiv, MS, [facility]  [phone]

## 2018-04-24 NOTE — PROGRESS NOTES
Problem: Hypertension  Goal: *Blood pressure within specified parameters  Outcome: Progressing Towards Goal  Assess blood pressure  Maintain calm environment  Administer hypertension medications  Monitor patients fluid volume  Transfer patient to ICU    1115-TRANSFER - OUT REPORT:    Verbal report given to ARIANA Lackey(name) on Krissy Lee  being transferred to 7 ICU Room 7101(unit) for change in patient condition(hypertension)       Report consisted of patients Situation, Background, Assessment and   Recommendations(SBAR). Information from the following report(s) SBAR, Kardex, Intake/Output, MAR and Recent Results was reviewed with the receiving nurse. Lines:   Peripheral IV 04/23/18 Right Antecubital (Active)   Site Assessment Clean, dry, & intact 4/24/2018 12:00 PM   Phlebitis Assessment 0 4/24/2018 12:00 PM   Infiltration Assessment 0 4/24/2018 12:00 PM   Dressing Status Clean, dry, & intact 4/24/2018 12:00 PM   Dressing Type Transparent;Tape 4/24/2018 12:00 PM   Hub Color/Line Status Pink;Capped 4/24/2018 12:00 PM   Action Taken Open ports on tubing capped 4/24/2018 12:00 PM   Alcohol Cap Used Yes 4/24/2018 12:00 PM       Peripheral IV 04/23/18 Right Forearm (Active)   Site Assessment Clean, dry, & intact 4/24/2018 12:00 PM   Phlebitis Assessment 0 4/24/2018 12:00 PM   Infiltration Assessment 0 4/24/2018 12:00 PM   Dressing Status Clean, dry, & intact 4/24/2018 12:00 PM   Dressing Type Transparent;Tape 4/24/2018 12:00 PM   Hub Color/Line Status Blue; Infusing 4/24/2018 12:00 PM   Action Taken Open ports on tubing capped 4/24/2018 12:00 PM   Alcohol Cap Used Yes 4/24/2018 12:00 PM       Peripheral IV 04/24/18 Left Antecubital (Active)   Site Assessment Clean, dry, & intact 4/24/2018 12:00 PM   Phlebitis Assessment 0 4/24/2018 12:00 PM   Infiltration Assessment 0 4/24/2018 12:00 PM   Dressing Status Clean, dry, & intact 4/24/2018 12:00 PM   Dressing Type Transparent;Tape 4/24/2018 12:00 PM   Hub Color/Line Status Pink; Infusing 4/24/2018 12:00 PM   Action Taken Open ports on tubing capped 4/24/2018 12:00 PM   Alcohol Cap Used Yes 4/24/2018 12:00 PM        Opportunity for questions and clarification was provided.       Patient transported with:   O2 @ 3 liters

## 2018-04-24 NOTE — PROGRESS NOTES
Still obtunded on exam  UOP increasing  Still on nitro drip  CT A/P findings noted  Increase bumex to 2mg IV BID - next dose 9pm  Daily labs for now  Cont with castro

## 2018-04-24 NOTE — PROGRESS NOTES
Hospitalist Progress Note  Keke Hernandez MD  Answering service: [phone] from in house phone  Cell: [phone]      Date of Service:  2018  NAME:  Arthur Hdez  :  1935  MRN:  [id number]      Admission Summary:   The patient is an 17-year-old female with history of diabetes and rheumatoid arthritis who presented to the emergency room via EMS with complaints of shortness of breath    Interval history / Subjective:   RRT was called at 0530 on 18, patient become more confused, lethargic and labor breathing     Assessment & Plan:     Acute on chronic hypoxic/hypercarbic respiratory failure due to pulmonary edema  -continue oxygen support, bumex 2 mg one dose , prn robyn neb, monitor pulse ox  -chest x ray 18 prominent pulmonary edema pattern.  No change.  -patient on labor breathing, tachypnic  -ABG pH 7.365 pCO2 46.3 pO2 97  -At risk for decompensation further, transfer to ICU    Acute encephalopathy possible due to metabolic  -RRT was called for restless and agitation early this morning, patient become more confused, disoriented and lethargic, wakeful to voice, non verbal but follows simple command  -continue neurocheck and supportive care  -MRI pending  -neurologist onboard    Sepsis secondary to E Coli UTI POA  -continue zosyn and vancomycin  -urine cx  grow e coli, pseudomanas sp  -blood cx  no growth    NSTEMI  -aspirin switched to supp, on plavix, fish oil, metoprolol iv, and nitrodrip  -cardiologist on board    Acute systolic CHF NYHA Class IV  -on iv metoprolol, ARB is held due to renal insufficiency   -monitor I/O and daily weight    JEROD on CKD stage III  -low urine out put  -bumex 2 mg iv once on   -monitor renal function, urine output  -nephrologist on board    Possible metastatic left breast cancer  -on armidex   -Hem/Onc on board    HTN  -on iv metoprolol, nitro gtt, received iv hydralazine, monitor BP    DM-II  -continue insulin sliding scale, monitor finger stick glucose    Hypothyroidism  -continue synthroid    Hx of GERD  -continue pepcid    Elevated liver enzyme   -possible related to liver lesion, monitor LFT    Anemia   -H/H stable, monitor cbc    Hx of colon cancer 25 years ago  -according to son, there is a work-up planned with [facility] regarding the possibility of recurrent CA (based on lesions seen in the calvarium on MRI - 3/19). -hem/onc on board    Code status: PAR, No CPR but ok for ACLS/BLS meds, shock and intubation  Case discussed with her son at bedside, at risk for decompensation,  consult to palliative care team.     Code status: PARTIAL   DVT prophylaxis:SCD    Care Plan discussed with: Patient/Family and Nurse  Disposition: Transfer to ICU      Case discussed with her son, Krystyna Asp , questions answered     Hospital Problems  Date Reviewed: 4/16/2018          Codes Class Noted POA    Altered mental status, unspecified ICD-10-CM: R41.82  ICD-9-CM: 780.97  4/23/2018 Unknown        * (Principal)SOB (shortness of breath) ICD-10-CM: R06.02  ICD-9-CM: 786.05  4/16/2018 Unknown                Vital Signs:    Last 24hrs VS reviewed since prior progress note. Most recent are:  Visit Vitals    /67 (BP 1 Location: Right arm, BP Patient Position: At rest)    Pulse (!) 114    Temp 97.5 °F (36.4 °C)    Resp 26    Ht 5' 1\" (1.549 m)    Wt 59.9 kg (132 lb)    SpO2 100%    BMI 24.94 kg/m2         Intake/Output Summary (Last 24 hours) at 04/24/18 0841  Last data filed at 04/24/18 0048   Gross per 24 hour   Intake              350 ml   Output              250 ml   Net              100 ml        Physical Examination:             Constitutional:  Acute moderate distress, cooperative, pleasant    ENT:  Oral mucous moist, oropharynx benign. Neck supple,    Resp:  Decrease bronchial breath sound, few wheezing and rhonic bilaterally.  No accessory muscle use   CV: Regular rhythm, normal rate, no murmurs, gallops, rubs    GI:  Soft, non distended, non tender. normoactive bowel sounds, no hepatosplenomegaly     Musculoskeletal:  pedal edema    Neurologic:  Lethargic, disoriented, lethargic wakeful to voice, follows simple command     Skin:  Good turgor, no rashes or ulcers       Data Review:    Review and/or order of clinical lab test      Labs:     Recent Labs      04/24/18 0319 04/23/18   0432   WBC  20.8*  15.5*   HGB  8.2*  7.4*   HCT  26.2*  23.0*   PLT  422*  331     Recent Labs      04/24/18 0319 04/23/18   1500  04/23/18   0432  04/22/18   0413   NA  138  140   --   141  136   K  3.1*  3.1*  3.1*  2.5*  3.1*   CL  103  104   --   104  102   CO2  23  25   --   26  25   BUN  50*  53*   --   48*  42*   CREA  1.86*  1.79*   --   1.74*  1.53*   GLU  188*  192*   --   107*  195*   CA  10.2*  10.2*   --   9.9  10.3*   MG  1.8   --   1.8   --    PHOS  4.0   --   3.7   --      Recent Labs      04/24/18 0319   SGOT  39*   ALT  27   AP  304*   TBILI  0.6   TP  7.8   ALB  2.9*   GLOB  4.9*     Recent Labs      04/22/18   0639   APTT  32.1*      No results for input(s): FE, TIBC, PSAT, FERR in the last 72 hours. No results found for: FOL, RBCF   No results for input(s): PH, PCO2, PO2 in the last 72 hours.   Recent Labs      04/24/18 0319 04/22/18   0735  04/22/18   0506   CPK   --   41  42   CKNDX   --   2.9*  Cannot be calculated   TROIQ  1.88*  1.50*  1.48*     Lab Results   Component Value Date/Time    Cholesterol, total 74 04/16/2018 04:21 AM    HDL Cholesterol 25 04/16/2018 04:21 AM    LDL, calculated 31.6 04/16/2018 04:21 AM    Triglyceride 87 04/16/2018 04:21 AM    CHOL/HDL Ratio 3.0 04/16/2018 04:21 AM     Lab Results   Component Value Date/Time    Glucose (POC) 212 (H) 04/24/2018 06:02 AM    Glucose (POC) 212 (H) 04/24/2018 02:36 AM    Glucose (POC) 131 (H) 04/23/2018 09:35 PM    Glucose (POC) 113 (H) 04/23/2018 04:55 PM    Glucose (POC) 105 (H) 04/23/2018 11:25 AM     Lab Results   Component Value Date/Time    Color YELLOW/STRAW 04/20/2018 04:30 AM    Appearance TURBID (A) 04/20/2018 04:30 AM    Specific gravity 1.029 04/20/2018 04:30 AM    pH (UA) 5.0 04/20/2018 04:30 AM    Protein 100 (A) 04/20/2018 04:30 AM    Glucose NEGATIVE  04/20/2018 04:30 AM    Ketone NEGATIVE  04/20/2018 04:30 AM    Bilirubin NEGATIVE  04/20/2018 04:30 AM    Urobilinogen 0.2 04/20/2018 04:30 AM    Nitrites NEGATIVE  04/20/2018 04:30 AM    Leukocyte Esterase LARGE (A) 04/20/2018 04:30 AM    Epithelial cells FEW 04/20/2018 04:30 AM    Bacteria 3+ (A) 04/20/2018 04:30 AM    WBC >100 (H) 04/20/2018 04:30 AM    RBC 5-10 04/20/2018 04:30 AM         Medications Reviewed:     Current Facility-Administered Medications   Medication Dose Route Frequency    furosemide (LASIX) injection 40 mg  40 mg IntraVENous ONCE    potassium chloride 10 mEq in 50 ml IVPB  10 mEq IntraVENous Q1H    nitroglycerin (Tridil) 200 mcg/ml infusion   mcg/min IntraVENous TITRATE    metoprolol (LOPRESSOR) injection 5 mg  5 mg IntraVENous Q6H    aspirin (ASA) suppository 300 mg  300 mg Rectal DAILY    nitroglycerin (NITROBID) 2 % ointment 1 Inch  1 Inch Topical Q6H PRN    hydrocortisone Sod Succ (PF) (SOLU-CORTEF) injection 25 mg  25 mg IntraVENous Q8H    sodium chloride (NS) flush 10-30 mL  10-30 mL InterCATHeter PRN    sodium chloride (NS) flush 10 mL  10 mL InterCATHeter Q24H    sodium chloride (NS) flush 10 mL  10 mL InterCATHeter PRN    sodium chloride (NS) flush 10-40 mL  10-40 mL InterCATHeter Q8H    sodium chloride (NS) flush 20 mL  20 mL InterCATHeter Q24H    alteplase (CATHFLO) 1 mg in sterile water (preservative free) 1 mL injection  1 mg InterCATHeter PRN    anastrozole (ARIMIDEX) tablet 1 mg  1 mg Oral DAILY    0.9% sodium chloride infusion 250 mL  250 mL IntraVENous PRN    acetaminophen (TYLENOL) tablet 650 mg  650 mg Oral Q4H PRN    epoetin celio (EPOGEN;PROCRIT) injection 10,000 Units  10,000 Units SubCUTAneous Q MON, WED & FRI    famotidine (PEPCID) tablet 20 mg  20 mg Oral DAILY    albuterol-ipratropium (DUO-NEB) 2.5 MG-0.5 MG/3 ML  3 mL Nebulization Q6H PRN    losartan (COZAAR) tablet 25 mg  25 mg Oral DAILY    piperacillin-tazobactam (ZOSYN) 3.375 g in 0.9% sodium chloride (MBP/ADV) 100 mL  3.375 g IntraVENous Q8H    glucose chewable tablet 16 g  4 Tab Oral PRN    dextrose (D50W) injection syrg 12.5-25 g  12.5-25 g IntraVENous PRN    glucagon (GLUCAGEN) injection 1 mg  1 mg IntraMUSCular PRN    insulin lispro (HUMALOG) injection   SubCUTAneous AC&HS    prochlorperazine (COMPAZINE) with saline injection 5 mg  5 mg IntraVENous Q8H PRN    ascorbic acid (vitamin C) (VITAMIN C) tablet 500 mg  500 mg Oral DAILY    cholecalciferol (VITAMIN D3) tablet 1,000 Units  1,000 Units Oral DAILY    clopidogrel (PLAVIX) tablet 75 mg  75 mg Oral DAILY    folic acid (FOLVITE) tablet 1 mg  1 mg Oral DAILY    glimepiride (AMARYL) tablet 1 mg  1 mg Oral 7am    magnesium oxide (MAG-OX) tablet 400 mg  400 mg Oral DAILY    therapeutic multivitamin (THERAGRAN) tablet 1 Tab  1 Tab Oral DAILY    fish oil-omega-3 fatty acids 340-1,000 mg capsule 1 Cap  1 Cap Oral DAILY    SITagliptin (JANUVIA) tablet tab 50 mg  50 mg Oral DAILY    sodium chloride (NS) flush 5-10 mL  5-10 mL IntraVENous Q8H    sodium chloride (NS) flush 5-10 mL  5-10 mL IntraVENous PRN    acetaminophen (TYLENOL) tablet 650 mg  650 mg Oral Q4H PRN    ondansetron (ZOFRAN) injection 4 mg  4 mg IntraVENous Q4H PRN    levothyroxine (SYNTHROID) tablet 88 mcg  88 mcg Oral ACB     ______________________________________________________________________  EXPECTED LENGTH OF STAY: 4d 12h  ACTUAL LENGTH OF STAY:          8                 Tobi Choi MD

## 2018-04-24 NOTE — PALLIATIVE CARE
Jordan Kevin Work      Dr. Svitlana Oliveira and I met with patient's son, Dougie Morton ([phone]), as patient was being attended to. He related an accurate understanding of her condition; new primary cancer (likely breast) that has metastasized to bone, liver. He also understands she had a heart attack that has worsened her overall function; also that she is likely dealing with another infection. He informed of his father's death to pancreatic cancer 6 months ago; understands that significant health crises often follow a loss. Patient has three children. Dougie Morton is the oldest and tends to take on most responsibilities for his mother. He reports she has completed and AMD that designates him as mPOA. We requested he bring form to hospital for our records. He is not quite sure how she has been emotionally since the loss of father. He states she tends to grieve more privately; does not want to burden them. She does, however, talk to her Medicare Aides about her loss. Son has been surprised by her condition when she came to ICU initially (felt she may die); equally surprised by her seeming improvement over the weekend, and again surprised to see her back in ICU and doing poorly. He is encouraged, however, of her ability to rebound as before. Patient has delirium now, and so not really able to participate in care goal discussions. He believes she understands she has cancer; that it has spread. He is not sure what this information means to her. He is very open to our team initiating conversations with her about what she understands, wants to know, and future goals. For now, goals are clear to continue with all care that can hopefully bridge her to recovery. He does not want CPR, shock. Will wait for official biopsy and see how the Femara makes her feel. He is confident that she would not want to continue with any treatments that are making her feel poorly or no longer working.   He is committed to hospice as his father had when the time is right. Thank you for the opportunity to be involved in the care of Ms. Ledesma and her family. Mariaelena Babcock, [facility], [facility]-  Palliative Medicine   [employer] ® ACP Facilitator   [phone]

## 2018-04-24 NOTE — PROGRESS NOTES
0030 BP elevated, PRN nitropaste given. 0100  Pt super wheezy upon assessment. PRN albuterol treatment given. 0130  Pt remains disoriented at this time and severely agitated. Pt attempting to get out of bed, pulling lines. PRN ativan given. 0230 Pt remains to be agitated, still attempting to get out of bed. Received orders for ABG.   0300 pt BP remain to be elevated, HR sinus tach in the 120's. Pt lungs are crackled upon auscultation, pt labored breathing. Notified Sandra Guerin MD. Received orders for EKG and chest xray. 0330  ABG and xray completed. ramiro SEXTON of results. Received orders for lasix. 0400 transferred pt to room 400A to 410 for close, continuous monitoring. 40mg lasix given,  0500 pt has not voided. Lungs sound the same, Hr still tachy and Bp still elevated. Bladders scan pt on two different bladder scans and shows 0ml.  0530 RRT called. Notified Sandra Guerin MD to see the pt at bedside. 0600 melva SEXTON at bedside. Received orders for hydralazine and romazicon. And another neb tx.     0730  Assessment remains the same. BP elevated. Lungs sound crackled, labored breathing still. Pt lethargic. arousable to speech and pain.  Hr now in the low 100's

## 2018-04-24 NOTE — ROUTINE PROCESS
1132 TRANSFER - IN REPORT:    Verbal report received from Mukul Verdin RN(name) on [address]  being received from [facility] (unit) for routine progression of care      Report consisted of patients Situation, Background, Assessment and   Recommendations(SBAR). Information from the following report(s) SBAR, Kardex, ED Summary, Procedure Summary, Intake/Output, MAR, Accordion and Recent Results was reviewed with the receiving nurse. Opportunity for questions and clarification was provided. Assessment completed upon patients arrival to unit and care assumed. Primary Nurse Antonio Larkin RN and Tatianna Azar RN performed a dual skin assessment on this patient No impairment noted  Calderon score is 13    2000 Bedside and Verbal shift change report given to Cristal Conn (oncoming nurse) by Elis Alvarez RN(offgoing nurse). Report included the following information SBAR, Kardex, ED Summary, Procedure Summary, Intake/Output, MAR, Accordion and Recent Results.

## 2018-04-24 NOTE — DIABETES MGMT
DTC Progress Note    Recommendations/ Comments: Chart reviewed due to hyperglycemia. Blood sugars >180 and pt has required 5 units of correction insulin today. If appropriate, please consider Lantus 15 units. Current hospital DM medication: correction scale Humalog, normal sensitivity. Chart reviewed on Sandy Ledesma. Patient is a 82 y.o. female with known Type 2 Diabetes on Januvia 50 mg/d; Amaryl 1 mg/d; Metformin 1000 mg BID at home. A1c:   Lab Results   Component Value Date/Time    Hemoglobin A1c 7.0 (H) 04/18/2018 08:14 AM    Hemoglobin A1c 6.9 (H) 11/30/2011 08:30 AM       Recent Glucose Results: Lab Results   Component Value Date/Time     (H) 04/24/2018 03:19 AM     (H) 04/24/2018 03:19 AM    GLUCPOC 225 (H) 04/24/2018 11:37 AM    GLUCPOC 212 (H) 04/24/2018 06:02 AM    GLUCPOC 212 (H) 04/24/2018 02:36 AM        Lab Results   Component Value Date/Time    Creatinine 1.79 (H) 04/24/2018 03:19 AM    Creatinine 1.86 (H) 04/24/2018 03:19 AM     Estimated Creatinine Clearance: 20.5 mL/min (based on Cr of 1.79). Active Orders   Diet    DIET CARDIAC Regular; Consistent Carb 1800kcal        PO intake: Patient Vitals for the past 72 hrs:   % Diet Eaten   04/23/18 1839 50 %   04/23/18 1500 50 %   04/22/18 1800 50 %       Will continue to follow as needed.     Thank you  Clay Nazario RD, CDE

## 2018-04-24 NOTE — PROGRESS NOTES
Cardiology Progress Note            Admit Date: 4/16/2018  Admit Diagnosis: SOB (shortness of breath)  Date: 4/24/2018     Time: 2:01 PM    HPI: 82 y.o. Female with new diagnosis of CHF. Presented with chief c/o of SOB, found to have pulmonary edema and EF 40% on TTE. Noted to have NSTEMI and JEROD on CKD. Pt with hx of colon cancer and now concern for metastatic breast CA. Was in ICU over weekend (4/21-4/22) for respiratory failure, sepsis, psa UTI. Improved and transferred to floor 4/24. [facility] on hold as probable metastatic disease. Interval hx:  Pt was aggitated last night, received ativan IV. Then had worsened SOB and AMS. RRT called last night- labored breathing, AMS, CXR showed worsened pulmonary edema. Lasix given but no urine output, bladder scan showed no retention. BP elevated, hydralazine was given. This a.m. On exam, pt was lethargic, minimally verbal, not following commands. BP elevated and dyspneic. Assessment and Plan       1. NSTEMI:  Troponin up 1.88 suspect due to demand   -no chest pain. Medical mgmt given other issues. -TTE:  EF 45%, severe hypokinesis apical walls, mod hypokinesis basal-mid apical walls.   -Continue ASA (change to suppository),  Plavix when able to take po,    -Change Coreg to metoprolol 5 mg IV q 6 hours with hold parameters   -Statin intolerant due to weakness on zocor, on repatha, last LDL 31, so will not rechallenge statin     2. Cardiomyopathy/Acute HFrEF, new:  EF 45% NYHA IV  Now   -CXR with worsened pulmonary edema   -lasix given this a.m. Without U/O- Ordered castro insertion, notified Dr. Myron Howard. Bumex ordered   -Hold losartan (AMS and elevated creatinine)   -Metoprolol as above   -Bumex 2 mg IV per nephrology      3. JEROD on CKD:   creatinine trended up 1.86 from 1.74  Yesterday   -Baseline creat 0.9-1 mg/dl   -Renal US with nonobstructing L kidney stone.  And a hepatic lesion   -Nephrology following   -diuretics per nephrology    4. Acute respiratory failure:  D/t acute pulmonary edema   -diuretics as above   -Pulmonary following. 5. Altered mental status: neurology on consult, following   -Received ativan last night- flumanenil given early a.m.   -Workup per neurology/primary team    6. Anemia: hgb 8.2 from 7.4 4/23/18   -Off heparin   -management per renal/primary team    7. Malignant HTN- BP elevated   -Start nitro gtt for BP control- target BP less than 150   -Change po BB to IV metoprolol   -Hydralazine 20 mg IV    8. Leukocytosis/ pseudomonal UTI:  Fever Resolved. WBC  Trending up. Antibx per primary team. Repeat cxs 4/22 pending. 9. Hx of Colon cancer, now suspected metastatic breast ca (bone, liver,calvarium mets)  -Hematology oncology following. 10  Hypokalemia:  K=3.1  Replete    Pt with acute pulmonary edema, HTN urgency and now with AMS. Nitroglycerine for BP control, diuretics ordered per nephrology, change po BB to IV metoprolol. Hydralizine IV x 1. Suspect troponin elevated due to demand re: pulmonary edema. Discussed pt status with Dr. Terese Charles and Dr. Devon Jolley. Transfer to ICU planned. Recommend palliative care consult. Dr. Meir Jay to see pt. Cardiology Attending:Patient seen and examined. I agree with NP assessment and plans. Obtunded, spoke with son. Magda Browne MD 4/24/2018 4:26 PM         Subjective:   Sandy S Baltazar mumbling, not evaluable. Objective:      Physical Exam:                Visit Vitals    BP (!) 175/98    Pulse (!) 115    Temp 97.5 °F (36.4 °C)    Resp 26    Ht 5' 1\" (1.549 m)    Wt 59.9 kg (132 lb)    SpO2 100%    BMI 24.94 kg/m2          General Appearance:   , elderly female, tachypneic   Ears/Nose/Mouth/Throat:    Hearing grossly normal.         Neck:  Supple. Chest:     Course crackles bilaterally Respirations labored.     Cardiovascular:   Regular rate and rhythm, S1, S2 normal, no murmur, tachycardic   Abdomen:    Soft, mild distension, bowel sounds present. Extremities:  No edema bilaterally. Skin:  Warm and dry.      Telemetry: Sinus tachycardia          Data Review:    Labs:    Recent Results (from the past 24 hour(s))   TYPE + CROSSMATCH    Collection Time: 04/23/18 10:59 AM   Result Value Ref Range    Crossmatch Expiration 04/26/2018     ABO/Rh(D) Iza Moralez POSITIVE     Antibody screen NEG     Unit number [id number]     Blood component type RC LR,1     Unit division 00     Status of unit ALLOCATED     Crossmatch result Compatible    GLUCOSE, POC    Collection Time: 04/23/18 11:25 AM   Result Value Ref Range    Glucose (POC) 105 (H) 65 - 100 mg/dL    Performed by Shirlee Osgood    POTASSIUM    Collection Time: 04/23/18  3:00 PM   Result Value Ref Range    Potassium 3.1 (L) 3.5 - 5.1 mmol/L   GLUCOSE, POC    Collection Time: 04/23/18  4:55 PM   Result Value Ref Range    Glucose (POC) 113 (H) 65 - 100 mg/dL    Performed by Luis A Guy 17, POC    Collection Time: 04/23/18  9:35 PM   Result Value Ref Range    Glucose (POC) 131 (H) 65 - 100 mg/dL    Performed by [facility]    GLUCOSE, POC    Collection Time: 04/24/18  2:36 AM   Result Value Ref Range    Glucose (POC) 212 (H) 65 - 100 mg/dL    Performed by Jose JOHNSTON    METABOLIC PANEL, BASIC    Collection Time: 04/24/18  3:19 AM   Result Value Ref Range    Sodium 140 136 - 145 mmol/L    Potassium 3.1 (L) 3.5 - 5.1 mmol/L    Chloride 104 97 - 108 mmol/L    CO2 25 21 - 32 mmol/L    Anion gap 11 5 - 15 mmol/L    Glucose 192 (H) 65 - 100 mg/dL    BUN 53 (H) 6 - 20 MG/DL    Creatinine 1.79 (H) 0.55 - 1.02 MG/DL    BUN/Creatinine ratio 30 (H) 12 - 20      GFR est AA 33 (L) >60 ml/min/1.73m2    GFR est non-AA 27 (L) >60 ml/min/1.73m2    Calcium 10.2 (H) 8.5 - 10.1 MG/DL   CBC WITH AUTOMATED DIFF    Collection Time: 04/24/18  3:19 AM   Result Value Ref Range    WBC 20.8 (H) 3.6 - 11.0 K/uL    RBC 2.92 (L) 3.80 - 5.20 M/uL    HGB 8.2 (L) 11.5 - 16.0 g/dL    HCT 26.2 (L) 35.0 - 47.0 %    MCV 89.7 80.0 - 99.0 FL    MCH 28.1 26.0 - 34.0 PG    MCHC 31.3 30.0 - 36.5 g/dL    RDW 23.2 (H) 11.5 - 14.5 %    PLATELET 584 (H) 471 - 400 K/uL    MPV 9.4 8.9 - 12.9 FL    NRBC 0.5 (H) 0  WBC    ABSOLUTE NRBC 0.11 (H) 0.00 - 0.01 K/uL    NEUTROPHILS 82 (H) 32 - 75 %    LYMPHOCYTES 7 (L) 12 - 49 %    MONOCYTES 10 5 - 13 %    EOSINOPHILS 0 0 - 7 %    BASOPHILS 0 0 - 1 %    METAMYELOCYTES 1 (H) 0 %    IMMATURE GRANULOCYTES 0 %    ABS. NEUTROPHILS 17.1 (H) 1.8 - 8.0 K/UL    ABS. LYMPHOCYTES 1.5 0.8 - 3.5 K/UL    ABS. MONOCYTES 2.1 (H) 0.0 - 1.0 K/UL    ABS. EOSINOPHILS 0.0 0.0 - 0.4 K/UL    ABS. BASOPHILS 0.0 0.0 - 0.1 K/UL    ABS. IMM. GRANS. 0.0 K/UL    DF MANUAL      PLATELET COMMENTS Large Platelets      RBC COMMENTS ANISOCYTOSIS  2+        RBC COMMENTS TARGET CELLS  PRESENT        RBC COMMENTS OVALOCYTES  PRESENT        RBC COMMENTS POLYCHROMASIA  1+        RBC COMMENTS RBC FRAGMENTS  PRESENT       METABOLIC PANEL, COMPREHENSIVE    Collection Time: 04/24/18  3:19 AM   Result Value Ref Range    Sodium 138 136 - 145 mmol/L    Potassium 3.1 (L) 3.5 - 5.1 mmol/L    Chloride 103 97 - 108 mmol/L    CO2 23 21 - 32 mmol/L    Anion gap 12 5 - 15 mmol/L    Glucose 188 (H) 65 - 100 mg/dL    BUN 50 (H) 6 - 20 MG/DL    Creatinine 1.86 (H) 0.55 - 1.02 MG/DL    BUN/Creatinine ratio 27 (H) 12 - 20      GFR est AA 32 (L) >60 ml/min/1.73m2    GFR est non-AA 26 (L) >60 ml/min/1.73m2    Calcium 10.2 (H) 8.5 - 10.1 MG/DL    Bilirubin, total 0.6 0.2 - 1.0 MG/DL    ALT (SGPT) 27 12 - 78 U/L    AST (SGOT) 39 (H) 15 - 37 U/L    Alk.  phosphatase 304 (H) 45 - 117 U/L    Protein, total 7.8 6.4 - 8.2 g/dL    Albumin 2.9 (L) 3.5 - 5.0 g/dL    Globulin 4.9 (H) 2.0 - 4.0 g/dL    A-G Ratio 0.6 (L) 1.1 - 2.2     MAGNESIUM    Collection Time: 04/24/18  3:19 AM   Result Value Ref Range    Magnesium 1.8 1.6 - 2.4 mg/dL   PHOSPHORUS    Collection Time: 04/24/18  3:19 AM   Result Value Ref Range    Phosphorus 4.0 2.6 - 4.7 MG/DL   TROPONIN I    Collection Time: 04/24/18  3:19 AM   Result Value Ref Range    Troponin-I, Qt. 1.88 (H) <0.05 ng/mL   EKG, 12 LEAD, INITIAL    Collection Time: 04/24/18  3:24 AM   Result Value Ref Range    Ventricular Rate 119 BPM    Atrial Rate 119 BPM    P-R Interval 134 ms    QRS Duration 96 ms    Q-T Interval 326 ms    QTC Calculation (Bezet) 458 ms    Calculated P Axis 57 degrees    Calculated R Axis 79 degrees    Calculated T Axis -130 degrees    Diagnosis       Sinus tachycardia  Septal infarct (cited on or before 24-APR-2018)  Marked ST abnormality, possible inferior subendocardial injury  When compared with ECG of 22-APR-2018 05:16,  Serial changes of evolving Septal infarct present     POC EG7    Collection Time: 04/24/18  3:31 AM   Result Value Ref Range    Calcium, ionized (POC) 1.41 (H) 1.12 - 1.32 mmol/L    pH (POC) 7.365 7.35 - 7.45      pCO2 (POC) 46.3 (H) 35.0 - 45.0 MMHG    pO2 (POC) 94 80 - 100 MMHG    HCO3 (POC) 26.5 (H) 22 - 26 MMOL/L    Base excess (POC) 1 mmol/L    sO2 (POC) 97 92 - 97 %    Site LEFT RADIAL      Device: ROOM AIR      Allens test (POC) YES      Specimen type (POC) ARTERIAL     GLUCOSE, POC    Collection Time: 04/24/18  6:02 AM   Result Value Ref Range    Glucose (POC) 212 (H) 65 - 100 mg/dL    Performed by Starr Nash           Radiology:        Current Facility-Administered Medications   Medication Dose Route Frequency    nitroglycerin (Tridil) 200 mcg/ml infusion   mcg/min IntraVENous TITRATE    metoprolol (LOPRESSOR) injection 5 mg  5 mg IntraVENous Q6H    aspirin (ASA) suppository 300 mg  300 mg Rectal DAILY    hydrALAZINE (APRESOLINE) 20 mg/mL injection 10 mg  10 mg IntraVENous ONCE    bumetanide (BUMEX) injection 2 mg  2 mg IntraVENous ONCE    nitroglycerin (NITROBID) 2 % ointment 1 Inch  1 Inch Topical Q6H PRN    hydrocortisone Sod Succ (PF) (SOLU-CORTEF) injection 25 mg  25 mg IntraVENous Q8H    sodium chloride (NS) flush 10-30 mL  10-30 mL InterCATHeter PRN    sodium chloride (NS) flush 10 mL  10 mL InterCATHeter Q24H    sodium chloride (NS) flush 10 mL  10 mL InterCATHeter PRN    sodium chloride (NS) flush 10-40 mL  10-40 mL InterCATHeter Q8H    sodium chloride (NS) flush 20 mL  20 mL InterCATHeter Q24H    alteplase (CATHFLO) 1 mg in sterile water (preservative free) 1 mL injection  1 mg InterCATHeter PRN    anastrozole (ARIMIDEX) tablet 1 mg  1 mg Oral DAILY    0.9% sodium chloride infusion 250 mL  250 mL IntraVENous PRN    acetaminophen (TYLENOL) tablet 650 mg  650 mg Oral Q4H PRN    epoetin celio (EPOGEN;PROCRIT) injection 10,000 Units  10,000 Units SubCUTAneous Q MON, WED & FRI    famotidine (PEPCID) tablet 20 mg  20 mg Oral DAILY    albuterol-ipratropium (DUO-NEB) 2.5 MG-0.5 MG/3 ML  3 mL Nebulization Q6H PRN    piperacillin-tazobactam (ZOSYN) 3.375 g in 0.9% sodium chloride (MBP/ADV) 100 mL  3.375 g IntraVENous Q8H    glucose chewable tablet 16 g  4 Tab Oral PRN    dextrose (D50W) injection syrg 12.5-25 g  12.5-25 g IntraVENous PRN    glucagon (GLUCAGEN) injection 1 mg  1 mg IntraMUSCular PRN    insulin lispro (HUMALOG) injection   SubCUTAneous AC&HS    prochlorperazine (COMPAZINE) with saline injection 5 mg  5 mg IntraVENous Q8H PRN    ascorbic acid (vitamin C) (VITAMIN C) tablet 500 mg  500 mg Oral DAILY    cholecalciferol (VITAMIN D3) tablet 1,000 Units  1,000 Units Oral DAILY    clopidogrel (PLAVIX) tablet 75 mg  75 mg Oral DAILY    folic acid (FOLVITE) tablet 1 mg  1 mg Oral DAILY    glimepiride (AMARYL) tablet 1 mg  1 mg Oral 7am    magnesium oxide (MAG-OX) tablet 400 mg  400 mg Oral DAILY    therapeutic multivitamin (THERAGRAN) tablet 1 Tab  1 Tab Oral DAILY    fish oil-omega-3 fatty acids 340-1,000 mg capsule 1 Cap  1 Cap Oral DAILY    SITagliptin (JANUVIA) tablet tab 50 mg  50 mg Oral DAILY    sodium chloride (NS) flush 5-10 mL  5-10 mL IntraVENous Q8H    sodium chloride (NS) flush 5-10 mL  5-10 mL IntraVENous PRN    acetaminophen (TYLENOL) tablet 650 mg  650 mg Oral Q4H PRN    ondansetron (ZOFRAN) injection 4 mg  4 mg IntraVENous Q4H PRN    levothyroxine (SYNTHROID) tablet 88 mcg  88 mcg Oral ACB     Pt critically ill, poor prognosis, multiple severe issues. Ruth Haynes.  MARIETTA [place]     [facility] [address] [place] [address], [address] [place] [address],[address]   Radha [place]   [phone]

## 2018-04-24 NOTE — PROGRESS NOTES
Verbal shift change report given to Haris Gray RN (oncoming nurse) by Michael Rucker RN (offgoing nurse). Report included the following information SBAR, Kardex, MAR and Recent Results.

## 2018-04-25 ENCOUNTER — APPOINTMENT (OUTPATIENT)
Dept: GENERAL RADIOLOGY | Age: 83
DRG: 853 | End: 2018-04-25
Attending: INTERNAL MEDICINE
Payer: MEDICARE

## 2018-04-25 LAB
ANION GAP SERPL CALC-SCNC: 12 MMOL/L (ref 5–15)
ANION GAP SERPL CALC-SCNC: 7 MMOL/L (ref 5–15)
ARTERIAL PATENCY WRIST A: ABNORMAL
BACTERIA SPEC CULT: NORMAL
BACTERIA SPEC CULT: NORMAL
BASE EXCESS BLD CALC-SCNC: 0 MMOL/L
BASOPHILS # BLD: 0 K/UL (ref 0–0.1)
BASOPHILS NFR BLD: 0 % (ref 0–1)
BDY SITE: ABNORMAL
BUN SERPL-MCNC: 48 MG/DL (ref 6–20)
BUN SERPL-MCNC: 60 MG/DL (ref 6–20)
BUN/CREAT SERPL: 26 (ref 12–20)
BUN/CREAT SERPL: 33 (ref 12–20)
CALCIUM SERPL-MCNC: 10 MG/DL (ref 8.5–10.1)
CALCIUM SERPL-MCNC: 9.1 MG/DL (ref 8.5–10.1)
CHLORIDE SERPL-SCNC: 105 MMOL/L (ref 97–108)
CHLORIDE SERPL-SCNC: 108 MMOL/L (ref 97–108)
CO2 SERPL-SCNC: 25 MMOL/L (ref 21–32)
CO2 SERPL-SCNC: 29 MMOL/L (ref 21–32)
CREAT SERPL-MCNC: 1.83 MG/DL (ref 0.55–1.02)
CREAT SERPL-MCNC: 1.86 MG/DL (ref 0.55–1.02)
DIFFERENTIAL METHOD BLD: ABNORMAL
EOSINOPHIL # BLD: 0 K/UL (ref 0–0.4)
EOSINOPHIL NFR BLD: 0 % (ref 0–7)
ERYTHROCYTE [DISTWIDTH] IN BLOOD BY AUTOMATED COUNT: 23.9 % (ref 11.5–14.5)
GAS FLOW.O2 O2 DELIVERY SYS: ABNORMAL L/MIN
GAS FLOW.O2 SETTING OXYMISER: 6 L/M
GLUCOSE BLD STRIP.AUTO-MCNC: 100 MG/DL (ref 65–100)
GLUCOSE BLD STRIP.AUTO-MCNC: 144 MG/DL (ref 65–100)
GLUCOSE BLD STRIP.AUTO-MCNC: 223 MG/DL (ref 65–100)
GLUCOSE BLD STRIP.AUTO-MCNC: 261 MG/DL (ref 65–100)
GLUCOSE SERPL-MCNC: 123 MG/DL (ref 65–100)
GLUCOSE SERPL-MCNC: 231 MG/DL (ref 65–100)
HCO3 BLD-SCNC: 25.8 MMOL/L (ref 22–26)
HCT VFR BLD AUTO: 24.9 % (ref 35–47)
HGB BLD-MCNC: 7.8 G/DL (ref 11.5–16)
IMM GRANULOCYTES # BLD: 0 K/UL
IMM GRANULOCYTES NFR BLD AUTO: 0 %
KOH PREP SPEC: NORMAL
LYMPHOCYTES # BLD: 1.4 K/UL (ref 0.8–3.5)
LYMPHOCYTES NFR BLD: 7 % (ref 12–49)
MAGNESIUM SERPL-MCNC: 1.9 MG/DL (ref 1.6–2.4)
MCH RBC QN AUTO: 28.7 PG (ref 26–34)
MCHC RBC AUTO-ENTMCNC: 31.3 G/DL (ref 30–36.5)
MCV RBC AUTO: 91.5 FL (ref 80–99)
METAMYELOCYTES NFR BLD MANUAL: 2 %
MONOCYTES # BLD: 0.8 K/UL (ref 0–1)
MONOCYTES NFR BLD: 4 % (ref 5–13)
MYELOCYTES NFR BLD MANUAL: 1 %
NEUTS SEG # BLD: 17.4 K/UL (ref 1.8–8)
NEUTS SEG NFR BLD: 86 % (ref 32–75)
NRBC # BLD: 0.23 K/UL (ref 0–0.01)
NRBC BLD-RTO: 1.1 PER 100 WBC
PCO2 BLD: 45.5 MMHG (ref 35–45)
PH BLD: 7.36 [PH] (ref 7.35–7.45)
PLATELET # BLD AUTO: 409 K/UL (ref 150–400)
PMV BLD AUTO: 9.8 FL (ref 8.9–12.9)
PO2 BLD: 81 MMHG (ref 80–100)
POTASSIUM SERPL-SCNC: 2.9 MMOL/L (ref 3.5–5.1)
POTASSIUM SERPL-SCNC: 3.4 MMOL/L (ref 3.5–5.1)
RBC # BLD AUTO: 2.72 M/UL (ref 3.8–5.2)
RBC MORPH BLD: ABNORMAL
SAO2 % BLD: 95 % (ref 92–97)
SERVICE CMNT-IMP: ABNORMAL
SERVICE CMNT-IMP: NORMAL
SODIUM SERPL-SCNC: 142 MMOL/L (ref 136–145)
SODIUM SERPL-SCNC: 144 MMOL/L (ref 136–145)
SPECIMEN TYPE: ABNORMAL
WBC # BLD AUTO: 20.2 K/UL (ref 3.6–11)

## 2018-04-25 PROCEDURE — 82803 BLOOD GASES ANY COMBINATION: CPT

## 2018-04-25 PROCEDURE — 36415 COLL VENOUS BLD VENIPUNCTURE: CPT | Performed by: INTERNAL MEDICINE

## 2018-04-25 PROCEDURE — 74011250636 HC RX REV CODE- 250/636: Performed by: INTERNAL MEDICINE

## 2018-04-25 PROCEDURE — 80048 BASIC METABOLIC PNL TOTAL CA: CPT | Performed by: NURSE PRACTITIONER

## 2018-04-25 PROCEDURE — 74011250636 HC RX REV CODE- 250/636: Performed by: HOSPITALIST

## 2018-04-25 PROCEDURE — 74011000250 HC RX REV CODE- 250: Performed by: INTERNAL MEDICINE

## 2018-04-25 PROCEDURE — 74011250637 HC RX REV CODE- 250/637: Performed by: HOSPITALIST

## 2018-04-25 PROCEDURE — 82962 GLUCOSE BLOOD TEST: CPT

## 2018-04-25 PROCEDURE — 76937 US GUIDE VASCULAR ACCESS: CPT

## 2018-04-25 PROCEDURE — 74011000258 HC RX REV CODE- 258: Performed by: INTERNAL MEDICINE

## 2018-04-25 PROCEDURE — 74011250637 HC RX REV CODE- 250/637: Performed by: INTERNAL MEDICINE

## 2018-04-25 PROCEDURE — 77010033678 HC OXYGEN DAILY

## 2018-04-25 PROCEDURE — 85025 COMPLETE CBC W/AUTO DIFF WBC: CPT | Performed by: INTERNAL MEDICINE

## 2018-04-25 PROCEDURE — 94640 AIRWAY INHALATION TREATMENT: CPT

## 2018-04-25 PROCEDURE — 77030018719 HC DRSG PTCH ANTIMIC J&J -A

## 2018-04-25 PROCEDURE — 74011636637 HC RX REV CODE- 636/637: Performed by: HOSPITALIST

## 2018-04-25 PROCEDURE — 77030020847 HC STATLOK BARD -A

## 2018-04-25 PROCEDURE — 83735 ASSAY OF MAGNESIUM: CPT | Performed by: HOSPITALIST

## 2018-04-25 PROCEDURE — 65610000006 HC RM INTENSIVE CARE

## 2018-04-25 PROCEDURE — 74011000250 HC RX REV CODE- 250: Performed by: NURSE PRACTITIONER

## 2018-04-25 PROCEDURE — 36600 WITHDRAWAL OF ARTERIAL BLOOD: CPT

## 2018-04-25 PROCEDURE — 71045 X-RAY EXAM CHEST 1 VIEW: CPT

## 2018-04-25 PROCEDURE — C1751 CATH, INF, PER/CENT/MIDLINE: HCPCS

## 2018-04-25 PROCEDURE — 87070 CULTURE OTHR SPECIMN AEROBIC: CPT | Performed by: INTERNAL MEDICINE

## 2018-04-25 PROCEDURE — 36569 INSJ PICC 5 YR+ W/O IMAGING: CPT | Performed by: INTERNAL MEDICINE

## 2018-04-25 PROCEDURE — 74011250636 HC RX REV CODE- 250/636: Performed by: NURSE PRACTITIONER

## 2018-04-25 PROCEDURE — 87210 SMEAR WET MOUNT SALINE/INK: CPT | Performed by: INTERNAL MEDICINE

## 2018-04-25 PROCEDURE — 77030032490 HC SLV COMPR SCD KNE COVD -B

## 2018-04-25 PROCEDURE — 87102 FUNGUS ISOLATION CULTURE: CPT | Performed by: INTERNAL MEDICINE

## 2018-04-25 PROCEDURE — 80048 BASIC METABOLIC PNL TOTAL CA: CPT | Performed by: INTERNAL MEDICINE

## 2018-04-25 PROCEDURE — 0BC78ZZ EXTIRPATION OF MATTER FROM LEFT MAIN BRONCHUS, VIA NATURAL OR ARTIFICIAL OPENING ENDOSCOPIC: ICD-10-PCS | Performed by: INTERNAL MEDICINE

## 2018-04-25 PROCEDURE — 76010000379 HC BRONCHOSCOPY DIAG/THERAPEUTIC

## 2018-04-25 PROCEDURE — 94660 CPAP INITIATION&MGMT: CPT

## 2018-04-25 RX ORDER — POTASSIUM CHLORIDE 14.9 MG/ML
10 INJECTION INTRAVENOUS
Status: COMPLETED | OUTPATIENT
Start: 2018-04-25 | End: 2018-04-28

## 2018-04-25 RX ORDER — SODIUM CHLORIDE 0.9 % (FLUSH) 0.9 %
10 SYRINGE (ML) INJECTION EVERY 24 HOURS
Status: DISCONTINUED | OUTPATIENT
Start: 2018-04-25 | End: 2018-05-11 | Stop reason: HOSPADM

## 2018-04-25 RX ORDER — POTASSIUM CHLORIDE 14.9 MG/ML
10 INJECTION INTRAVENOUS
Status: DISCONTINUED | OUTPATIENT
Start: 2018-04-25 | End: 2018-04-25

## 2018-04-25 RX ORDER — HYDRALAZINE HYDROCHLORIDE 20 MG/ML
10 INJECTION INTRAMUSCULAR; INTRAVENOUS
Status: DISCONTINUED | OUTPATIENT
Start: 2018-04-25 | End: 2018-04-26

## 2018-04-25 RX ORDER — POTASSIUM CHLORIDE 14.9 MG/ML
INJECTION INTRAVENOUS
Status: DISPENSED
Start: 2018-04-25 | End: 2018-04-26

## 2018-04-25 RX ORDER — BACITRACIN 500 UNIT/G
1 PACKET (EA) TOPICAL AS NEEDED
Status: DISCONTINUED | OUTPATIENT
Start: 2018-04-25 | End: 2018-05-11 | Stop reason: HOSPADM

## 2018-04-25 RX ORDER — CARVEDILOL 6.25 MG/1
6.25 TABLET ORAL 2 TIMES DAILY
Status: DISCONTINUED | OUTPATIENT
Start: 2018-04-25 | End: 2018-04-27

## 2018-04-25 RX ORDER — SODIUM CHLORIDE 0.9 % (FLUSH) 0.9 %
20 SYRINGE (ML) INJECTION AS NEEDED
Status: DISCONTINUED | OUTPATIENT
Start: 2018-04-25 | End: 2018-05-11 | Stop reason: HOSPADM

## 2018-04-25 RX ORDER — SODIUM CHLORIDE 0.9 % (FLUSH) 0.9 %
10 SYRINGE (ML) INJECTION EVERY 8 HOURS
Status: DISCONTINUED | OUTPATIENT
Start: 2018-04-25 | End: 2018-05-11 | Stop reason: HOSPADM

## 2018-04-25 RX ORDER — SODIUM CHLORIDE 0.9 % (FLUSH) 0.9 %
10 SYRINGE (ML) INJECTION AS NEEDED
Status: DISCONTINUED | OUTPATIENT
Start: 2018-04-25 | End: 2018-05-11 | Stop reason: HOSPADM

## 2018-04-25 RX ORDER — GUAIFENESIN 100 MG/5ML
81 LIQUID (ML) ORAL DAILY
Status: DISCONTINUED | OUTPATIENT
Start: 2018-04-25 | End: 2018-04-26

## 2018-04-25 RX ORDER — POTASSIUM CHLORIDE 29.8 MG/ML
20 INJECTION INTRAVENOUS ONCE
Status: COMPLETED | OUTPATIENT
Start: 2018-04-25 | End: 2018-04-28

## 2018-04-25 RX ADMIN — POTASSIUM CHLORIDE 10 MEQ: 200 INJECTION, SOLUTION INTRAVENOUS at 08:00

## 2018-04-25 RX ADMIN — Medication 10 ML: at 18:12

## 2018-04-25 RX ADMIN — INSULIN LISPRO 3 UNITS: 100 INJECTION, SOLUTION INTRAVENOUS; SUBCUTANEOUS at 11:46

## 2018-04-25 RX ADMIN — MEROPENEM 500 MG: 500 INJECTION, POWDER, FOR SOLUTION INTRAVENOUS at 00:45

## 2018-04-25 RX ADMIN — Medication 20 ML: at 11:31

## 2018-04-25 RX ADMIN — SODIUM CHLORIDE 1 MG/MIN: 900 INJECTION, SOLUTION INTRAVENOUS at 14:13

## 2018-04-25 RX ADMIN — POTASSIUM CHLORIDE 10 MEQ: 200 INJECTION, SOLUTION INTRAVENOUS at 15:30

## 2018-04-25 RX ADMIN — SODIUM CHLORIDE 2 MG/MIN: 900 INJECTION, SOLUTION INTRAVENOUS at 11:06

## 2018-04-25 RX ADMIN — Medication 10 ML: at 16:47

## 2018-04-25 RX ADMIN — MEROPENEM 500 MG: 500 INJECTION, POWDER, FOR SOLUTION INTRAVENOUS at 14:13

## 2018-04-25 RX ADMIN — INSULIN LISPRO 2 UNITS: 100 INJECTION, SOLUTION INTRAVENOUS; SUBCUTANEOUS at 18:16

## 2018-04-25 RX ADMIN — LEVOTHYROXINE SODIUM 88 MCG: 88 TABLET ORAL at 07:24

## 2018-04-25 RX ADMIN — IPRATROPIUM BROMIDE AND ALBUTEROL SULFATE 3 ML: .5; 3 SOLUTION RESPIRATORY (INHALATION) at 14:05

## 2018-04-25 RX ADMIN — POTASSIUM CHLORIDE 10 MEQ: 200 INJECTION, SOLUTION INTRAVENOUS at 10:30

## 2018-04-25 RX ADMIN — HYDROCORTISONE SODIUM SUCCINATE 25 MG: 100 INJECTION, POWDER, FOR SOLUTION INTRAMUSCULAR; INTRAVENOUS at 08:48

## 2018-04-25 RX ADMIN — BUMETANIDE 2 MG: 0.25 INJECTION INTRAMUSCULAR; INTRAVENOUS at 20:25

## 2018-04-25 RX ADMIN — Medication 10 ML: at 23:23

## 2018-04-25 RX ADMIN — POTASSIUM CHLORIDE 10 MEQ: 200 INJECTION, SOLUTION INTRAVENOUS at 14:20

## 2018-04-25 RX ADMIN — HYDROCORTISONE SODIUM SUCCINATE 25 MG: 100 INJECTION, POWDER, FOR SOLUTION INTRAMUSCULAR; INTRAVENOUS at 00:45

## 2018-04-25 RX ADMIN — CLOPIDOGREL BISULFATE 75 MG: 75 TABLET ORAL at 11:50

## 2018-04-25 RX ADMIN — POTASSIUM CHLORIDE 10 MEQ: 200 INJECTION, SOLUTION INTRAVENOUS at 17:21

## 2018-04-25 RX ADMIN — NITROGLYCERIN 55 MCG/MIN: 20 INJECTION INTRAVENOUS at 04:57

## 2018-04-25 RX ADMIN — METOPROLOL TARTRATE 5 MG: 5 INJECTION, SOLUTION INTRAVENOUS at 04:35

## 2018-04-25 RX ADMIN — Medication 10 ML: at 05:00

## 2018-04-25 RX ADMIN — BUMETANIDE 2 MG: 0.25 INJECTION INTRAMUSCULAR; INTRAVENOUS at 08:49

## 2018-04-25 RX ADMIN — Medication 10 ML: at 04:36

## 2018-04-25 RX ADMIN — ANASTROZOLE 1 MG: 1 TABLET, COATED ORAL at 11:54

## 2018-04-25 RX ADMIN — POTASSIUM CHLORIDE 20 MEQ: 400 INJECTION, SOLUTION INTRAVENOUS at 20:25

## 2018-04-25 RX ADMIN — EPOETIN ALFA 10000 UNITS: 10000 SOLUTION INTRAVENOUS; SUBCUTANEOUS at 14:14

## 2018-04-25 RX ADMIN — SODIUM CHLORIDE 0.4 MCG/KG/HR: 900 INJECTION, SOLUTION INTRAVENOUS at 09:18

## 2018-04-25 RX ADMIN — POTASSIUM CHLORIDE 10 MEQ: 200 INJECTION, SOLUTION INTRAVENOUS at 10:00

## 2018-04-25 RX ADMIN — IPRATROPIUM BROMIDE AND ALBUTEROL SULFATE 3 ML: .5; 3 SOLUTION RESPIRATORY (INHALATION) at 04:18

## 2018-04-25 RX ADMIN — Medication 10 ML: at 11:31

## 2018-04-25 RX ADMIN — Medication 10 ML: at 23:22

## 2018-04-25 RX ADMIN — INSULIN LISPRO 5 UNITS: 100 INJECTION, SOLUTION INTRAVENOUS; SUBCUTANEOUS at 07:25

## 2018-04-25 RX ADMIN — Medication 10 ML: at 16:46

## 2018-04-25 NOTE — PROGRESS NOTES
NUTRITION  Pt seen for:         [x]           Rescreen/LOS         RECOMMENDATIONS:   Resume diet post colonoscopy    Benefiber tid  SUBJECTIVE/OBJECTIVE:   Information obtained from:   Chart, RN and nursing aide        Diet: Cardiac Consistent CHO 1800 kcal     Intake: []           Good     [x]           Fair      []           Poor   Patient Vitals for the past 100 hrs:   % Diet Eaten   04/23/18 1839 50 %   04/23/18 1500 50 %   04/22/18 1800 50 %      Weight Changes:   []            Loss  []            Gain  [x]            Stable    Estimated Nutrition Needs:   Kcals/day: 5534 Kcals/day (HBE x 1.4)  Protein: 72 g (1.2g/kg)  Fluid:  (1 ml/kcal)  Based On: [place]  Weight Used: Actual wt (60 kg)    Nutrition Problems Identified:  [x]      None. Pt seen for LOS. Pt admitted with SOB; found to have probable metastatic breast cancer. MST negative on admission indicating good appetite/no weight changes PTA. Appetite has been fair to good since admission-eating % of meals per documented intake. Nursing aide reports pt eating well until yesterday. Consider adding po supplements in the future if appetite declines and/or losing weight. Being prepped for colonoscopy tomorrow d/t fecal ball (seen on CT scan). Once pt disimpacted-may wish to add Benefiber tid to provide soluble fiber/aid with bowel regularity.      PLAN:     [x]           Rescreen per screening protocol  [x]           Add Supplements prn    Rescreen:  [x]            At Mild Nutrition Risk           []            Not at [address] [place] [address], [address] [facility]

## 2018-04-25 NOTE — CONSULTS
[address]    Loulou Red  MR#: [id number]  : 1935  ACCOUNT #: [de-identified]   DATE OF SERVICE: 2018    CHIEF COMPLAINT:  The patient admitted for severe respiratory infection. Severe constipation at the present time. HISTORY OF PRESENT ILLNESS:  This is an 82-year-old white female admitted for a severe respiratory infection. She has many issues regarding other systems and other organs including chronic renal failure, neuropathy, coronary artery disease and rheumatoid arthritis. According to the patient, she has had significant constipation lately. PAST MEDICAL HISTORY:  History of diabetes, elevated cholesterol, hypothyroidism. SURGERIES IN THE PAST:  Hysterectomy. Colectomy for colon CA . She has a history of breast CA with metastases in the bone and the liver. History of anemia, previous episode of respiratory infection. ALLERGIES:  PATIENT IS ALLERGIC TO SULFA DRUGS. FAMILY HISTORY:  Father  of gastric CA at the age of 80, mother , not sure of causes, at the age of 80. Five brothers, 2 die. Four sisters, 3 children, all boys.   in 2017 of carcinoma in the pancreas. REVIEW OF SYSTEMS:  Patient is alert, very cooperative. Recovering from a respiratory infection. No evidence of any chest pain. Some shortness of breath. No urinary tract symptoms, no GI symptoms except constipation. PHYSICAL EXAMINATION:    GENERAL:  The general appearance of the patient is consistent with her multiple problems. Alert. VITAL SIGNS:  The blood pressure 131/72, heart rate 102, respirations 27, pulse oximetry 96%. HEAD AND NECK:  Essentially a negative. No masses are noted. RESPIRATORY:  Sounds are consistent with a current respiratory infection. HEART:  Sounds are regular. No murmurs. ABDOMEN:  Soft, no masses, no tenderness. EXTREMITIES:  Essentially negative.     RECTAL:  Showed no evidence of any fecal impaction. No gross amount of blood present in the stools. IMPRESSION:  1. Patient was admitted for respiratory infection and she is facing this problem at the present time. 2.  Coronary artery disease. Breast CA with bone and liver metastases. 3.  Neuropathy, rheumatoid arthritis. RECOMMENDATIONS:  No further recommendations are made at this point.       MD BETSY Rodriguez / [place]  D: 04/25/2018 09:14     T: 04/25/2018 13:28  JOB #: [id number]

## 2018-04-25 NOTE — PROGRESS NOTES
[facility]   [address] [facility], [facility] [place] [facility], [facility]  Phone: [phone]   [phone]       Nephrology Progress Note  Idalia Cabrera     1935     [id number]  Date of Admission : 4/16/2018 04/25/18    CC: Follow up for JEROD        Assessment and Plan   JEROD on CKD   - Initially from Cardiac event/ NSTEMI + hypotension/sepsis  - labile BP + diuresis causing further injury  - Cr stable  - cont bumex BID for now  - daily labs  - keep castro for now    Hypokalemia:  - replete PRN     Labile HTN:  - leading to flash pulm edema  - wean nitro drip  - add PRN hydralazine    Resp Failure:  - likely from pulm edema   - now with L hemithorax opacification  - cont diuretics  - U/S today    Sepsis w/ Shock :  - ? Source: stercoral proctitis from rectal fecal impaction   - WBC 20K  - on abx     CKD Stage III :  - baseline Cr 0.9-1 mg/dl lately   - progressing, Cr around 1.5 to 1.7 here     NSTEMI :  - Echo shows EF 35-45%, mod LVH, Severe Hypokinesis of apex and moderate Hypokinesis of anteroseptal wall     Acute Systolic CHF: 2/2 MI   - avoid  ACE/ARB for now     Chronic Pontine Infarcts      ? Brain Mets on MRI and Liver met    hx of colon Ca     Anemia :  - per heme/onc     Interval History:  Seen and examined. Doing better today. On NC O2. CXR findings noted. Cr stable, UOP stable. Remains on nitro drip. Awake, mild SOB, no cp, n/v/d reported by pt. Review of Systems: Pertinent items are noted in HPI.     Current Medications:   Current Facility-Administered Medications   Medication Dose Route Frequency    potassium chloride 10 mEq in 50 ml IVPB  10 mEq IntraVENous Q1H    nitroglycerin (Tridil) 200 mcg/ml infusion   mcg/min IntraVENous TITRATE    metoprolol (LOPRESSOR) injection 5 mg  5 mg IntraVENous Q6H    meropenem (MERREM) 500 mg in 0.9% sodium chloride (MBP/ADV) 50 mL  0.5 g IntraVENous Q12H    [START ON 4/26/2018] levoFLOXacin (LEVAQUIN) 500 mg in D5W IVPB  500 mg IntraVENous Q48H    bumetanide (BUMEX) injection 2 mg  2 mg IntraVENous Q12H    hydrocortisone Sod Succ (PF) (SOLU-CORTEF) injection 25 mg  25 mg IntraVENous Q8H    sodium chloride (NS) flush 10-30 mL  10-30 mL InterCATHeter PRN    sodium chloride (NS) flush 10 mL  10 mL InterCATHeter Q24H    sodium chloride (NS) flush 10 mL  10 mL InterCATHeter PRN    sodium chloride (NS) flush 10-40 mL  10-40 mL InterCATHeter Q8H    sodium chloride (NS) flush 20 mL  20 mL InterCATHeter Q24H    alteplase (CATHFLO) 1 mg in sterile water (preservative free) 1 mL injection  1 mg InterCATHeter PRN    anastrozole (ARIMIDEX) tablet 1 mg  1 mg Oral DAILY    0.9% sodium chloride infusion 250 mL  250 mL IntraVENous PRN    acetaminophen (TYLENOL) tablet 650 mg  650 mg Oral Q4H PRN    epoetin celio (EPOGEN;PROCRIT) injection 10,000 Units  10,000 Units SubCUTAneous Q MON, WED & FRI    famotidine (PEPCID) tablet 20 mg  20 mg Oral DAILY    albuterol-ipratropium (DUO-NEB) 2.5 MG-0.5 MG/3 ML  3 mL Nebulization Q6H PRN    glucose chewable tablet 16 g  4 Tab Oral PRN    dextrose (D50W) injection syrg 12.5-25 g  12.5-25 g IntraVENous PRN    glucagon (GLUCAGEN) injection 1 mg  1 mg IntraMUSCular PRN    insulin lispro (HUMALOG) injection   SubCUTAneous AC&HS    prochlorperazine (COMPAZINE) with saline injection 5 mg  5 mg IntraVENous Q8H PRN    ascorbic acid (vitamin C) (VITAMIN C) tablet 500 mg  500 mg Oral DAILY    cholecalciferol (VITAMIN D3) tablet 1,000 Units  1,000 Units Oral DAILY    clopidogrel (PLAVIX) tablet 75 mg  75 mg Oral DAILY    folic acid (FOLVITE) tablet 1 mg  1 mg Oral DAILY    magnesium oxide (MAG-OX) tablet 400 mg  400 mg Oral DAILY    therapeutic multivitamin (THERAGRAN) tablet 1 Tab  1 Tab Oral DAILY    fish oil-omega-3 fatty acids 340-1,000 mg capsule 1 Cap  1 Cap Oral DAILY    sodium chloride (NS) flush 5-10 mL  5-10 mL IntraVENous Q8H    sodium chloride (NS) flush 5-10 mL  5-10 mL IntraVENous PRN    acetaminophen (TYLENOL) tablet 650 mg  650 mg Oral Q4H PRN    ondansetron (ZOFRAN) injection 4 mg  4 mg IntraVENous Q4H PRN    levothyroxine (SYNTHROID) tablet 88 mcg  88 mcg Oral ACB      Allergies   Allergen Reactions    Statins-Hmg-Coa Reductase Inhibitors Other (comments)     Intolerant to statins     Sulfa (Sulfonamide Antibiotics) Other (comments)     syncope       Objective:  Vitals:    Vitals:    04/25/18 0500 04/25/18 0600 04/25/18 0700 04/25/18 0726   BP: 162/84 139/84 144/76 131/72   Pulse: (!) 107 (!) 106 (!) 106 (!) 102   Resp: 30 (!) 31 27    Temp:       SpO2: 100% 91% 96%    Weight:       Height:         Intake and Output:  04/25 0701 - 04/25 1900  In: 69.5 [I.V.:69.5]  Out: -   04/23 1901 - 04/25 0700  In: 547.5 [I.V.:547.5]  Out: 2290 [Urine:2290]    Physical Examination:    General: lethargic  Neck:  Supple, no mass  Resp:  Diminished at bases   CV:  RRR, no murmur  GI:  Soft, NT, + BS  Neurologic:  Non focal     []    High complexity decision making was performed  []    Patient is at high-risk of decompensation with multiple organ involvement    Lab Data Personally Reviewed: I have reviewed all the pertinent labs, microbiology data and radiology studies during assessment.     Recent Labs      04/25/18   0453  04/24/18   0319  04/23/18   1500  04/23/18   0432   NA  142  138  140   --   141   K  2.9*  3.1*  3.1*  3.1*  2.5*   CL  105  103  104   --   104   CO2  25 23  25   --   26   GLU  231*  188*  192*   --   107*   BUN  48*  50*  53*   --   48*   CREA  1.86*  1.86*  1.79*   --   1.74*   CA  10.0  10.2*  10.2*   --   9.9   MG  1.9  1.8   --   1.8   PHOS   --   4.0   --   3.7   ALB   --   2.9*   --    --    SGOT   --   39*   --    --    ALT   --   27   --    --      Recent Labs      04/25/18   0453  04/24/18   0319  04/23/18   0432   WBC  20.2*  20.8*  15.5*   HGB  7.8*  8.2*  7.4*   HCT  24.9*  26.2*  23.0*   PLT  409*  422*  331     No results found for: ANIYAH  Lab Results   Component Value Date/Time    Culture result: NO GROWTH 1 DAY 04/22/2018 07:35 AM    Culture result: NO GROWTH 3 DAYS 04/22/2018 07:35 AM    Culture result: GRAM NEGATIVE RODS (A) 04/20/2018 04:30 AM    Culture result:  04/20/2018 04:30 AM      PLEASE REFER TO  [id number] FOR FURTHER IDENTIFICATION AND SUSCEPTIBILITIES    Culture result: NO GROWTH 6 DAYS 04/19/2018 08:36 PM    Culture result: NO GROWTH 6 DAYS 04/19/2018 08:36 PM     Recent Results (from the past 24 hour(s))   GLUCOSE, POC    Collection Time: 04/24/18 11:37 AM   Result Value Ref Range    Glucose (POC) 225 (H) 65 - 100 mg/dL    Performed by Octavio LE G3 - PUL    Collection Time: 04/24/18 12:27 PM   Result Value Ref Range    pH (POC) 7.394 7.35 - 7.45      pCO2 (POC) 41.0 35.0 - 45.0 MMHG    pO2 (POC) 87 80 - 100 MMHG    HCO3 (POC) 25.1 22 - 26 MMOL/L    sO2 (POC) 97 92 - 97 %    Base excess (POC) 0 mmol/L    Site RIGHT RADIAL      Device: NASAL CANNULA      Flow rate (POC) 2 L/M    Allens test (POC) YES      Specimen type (POC) ARTERIAL     GLUCOSE, POC    Collection Time: 04/24/18  4:18 PM   Result Value Ref Range    Glucose (POC) 222 (H) 65 - 100 mg/dL    Performed by TorresCBRITE    GLUCOSE, POC    Collection Time: 04/24/18  9:13 PM   Result Value Ref Range    Glucose (POC) 151 (H) 65 - 100 mg/dL    Performed by Jules Costa    METABOLIC PANEL, BASIC    Collection Time: 04/25/18  4:53 AM   Result Value Ref Range    Sodium 142 136 - 145 mmol/L    Potassium 2.9 (L) 3.5 - 5.1 mmol/L    Chloride 105 97 - 108 mmol/L    CO2 25 21 - 32 mmol/L    Anion gap 12 5 - 15 mmol/L    Glucose 231 (H) 65 - 100 mg/dL    BUN 48 (H) 6 - 20 MG/DL    Creatinine 1.86 (H) 0.55 - 1.02 MG/DL    BUN/Creatinine ratio 26 (H) 12 - 20      GFR est AA 32 (L) >60 ml/min/1.73m2    GFR est non-AA 26 (L) >60 ml/min/1.73m2    Calcium 10.0 8.5 - 10.1 MG/DL   CBC WITH AUTOMATED DIFF    Collection Time: 04/25/18  4:53 AM   Result Value Ref Range    WBC 20.2 (H) 3.6 - 11.0 K/uL RBC 2.72 (L) 3.80 - 5.20 M/uL    HGB 7.8 (L) 11.5 - 16.0 g/dL    HCT 24.9 (L) 35.0 - 47.0 %    MCV 91.5 80.0 - 99.0 FL    MCH 28.7 26.0 - 34.0 PG    MCHC 31.3 30.0 - 36.5 g/dL    RDW 23.9 (H) 11.5 - 14.5 %    PLATELET 263 (H) 083 - 400 K/uL    MPV 9.8 8.9 - 12.9 FL    NRBC 1.1 (H) 0  WBC    ABSOLUTE NRBC 0.23 (H) 0.00 - 0.01 K/uL    NEUTROPHILS 86 (H) 32 - 75 %    LYMPHOCYTES 7 (L) 12 - 49 %    MONOCYTES 4 (L) 5 - 13 %    EOSINOPHILS 0 0 - 7 %    BASOPHILS 0 0 - 1 %    METAMYELOCYTES 2 (H) 0 %    MYELOCYTES 1 (H) 0 %    IMMATURE GRANULOCYTES 0 %    ABS. NEUTROPHILS 17.4 (H) 1.8 - 8.0 K/UL    ABS. LYMPHOCYTES 1.4 0.8 - 3.5 K/UL    ABS. MONOCYTES 0.8 0.0 - 1.0 K/UL    ABS. EOSINOPHILS 0.0 0.0 - 0.4 K/UL    ABS. BASOPHILS 0.0 0.0 - 0.1 K/UL    ABS. IMM. GRANS. 0.0 K/UL    DF MANUAL      RBC COMMENTS ANISOCYTOSIS  2+        RBC COMMENTS POLYCHROMASIA  1+        RBC COMMENTS OVALOCYTES  PRESENT        RBC COMMENTS HYPOCHROMIA  1+        RBC COMMENTS TARGET CELLS  PRESENT        RBC COMMENTS SPHEROCYTES  PRESENT       MAGNESIUM    Collection Time: 04/25/18  4:53 AM   Result Value Ref Range    Magnesium 1.9 1.6 - 2.4 mg/dL   POC G3 - PUL    Collection Time: 04/25/18  5:05 AM   Result Value Ref Range    pH (POC) 7.360 7.35 - 7.45      pCO2 (POC) 45.5 (H) 35.0 - 45.0 MMHG    pO2 (POC) 81 80 - 100 MMHG    HCO3 (POC) 25.8 22 - 26 MMOL/L    sO2 (POC) 95 92 - 97 %    Base excess (POC) 0 mmol/L    Site RIGHT RADIAL      Device: NASAL CANNULA      Flow rate (POC) 6 L/M    Allens test (POC) N/A      Specimen type (POC) ARTERIAL     GLUCOSE, POC    Collection Time: 04/25/18  7:13 AM   Result Value Ref Range    Glucose (POC) 261 (H) 65 - 100 mg/dL    Performed by Brannon Duncan I have reviewed the flowsheets. Chart and Pertinent Notes have been reviewed. No change in PMH ,family and social history from Consult note.       Contreras Nicholson MD

## 2018-04-25 NOTE — PROCEDURES
[facility]  Bronchoscopy Report    Procedure: Therapeutic bronchoscopy. Indication: Mucus Plugging    Consent/Treatment: Informed consent was obtained from the  family after risks, benefits and alternatives were explained. Timeout verified the correct patient and correct procedure. Anesthesia:   Topical sedation to nares, mouth, and tracheobronchial tree with lidocaine  Moderate sedation with precedex drip was used    Moderate ( conscious ) sedation was administered by the endoscopy nurse and supervised by the endoscopist. The following parameters were monitored: oxygen saturation, heart rate, blood pressure, respiratory rate, EKG, adequacy of pulmonary ventilation and response to care. Total physician intraservice time was 30 min    Procedure Details:   -- The bronchoscope was introduced orally with use of a bite block. -- The vocal cords were found to be normal.  -- The trachea and nikolay were completely inspected and were found to be normal.  -- The right-sided endobronchial anatomy was completely inspected and was found to be normal.  -- The left-sided endobronchial anatomy was completely inspected and 100% plug proximal LMS- yellow thick and suctioned clear.      Specimens:   Bronchial washings were sent for  microbiology, AFB smear and culture and fungal culture    Rapid On-Site Evaluation: A preliminary diagnosis of Left main stem plug- suctioned clear    Complications: none    Estimated Blood Loss: Minimal    Yasmin Brown MD

## 2018-04-25 NOTE — PROGRESS NOTES
Patient transferred to ICU for b/p management, sepsis, neuro assessment:patient is obtunded. Care management continuing to follow.  Rangel Childs RN,CRM

## 2018-04-25 NOTE — CONSULTS
GI Consult. Abdomen is soft. Rectal exam is normal with soft stools.  Will try colonoscopy tomorrow,

## 2018-04-25 NOTE — PROGRESS NOTES
Hospitalist Progress Note  Linnie Apley, MD  Answering service: [phone] from in house phone  Cell: [phone]      Date of Service:  2018  NAME:  Richelle Shore  :  1935  MRN:  [id number]      Admission Summary:   The patient is an 82-year-old female with history of diabetes and rheumatoid arthritis who presented to the emergency room via EMS with complaints of shortness of breath    Interval history / Subjective:   Feels better, more alert    RRT was called at 0530 on 18, fore more confusion, lethargic and labor breathing     Assessment & Plan:     Acute on chronic hypoxic/hypercarbic respiratory failure due to pulmonary edema  -on BiPAP support, prn duo neb,  bumex 2 mg bid, monitor pulse ox  -chest x ray  new, complete left hemithorax opacification. Abrupt termination of the left  mainstem bronchus suggests endobronchial mucous plugging.  -s/p bronchoscopy on 4/15 and suctioned and cleared  -intensivist on board    Acute encephalopathy possible due to metabolic  -RRT was called for restless and agitation early this morning, patient become more confused, disoriented and lethargic, wakeful to voice, non verbal but follows simple command  -continue neurocheck and supportive care  -CT head on  volume loss and minimal white matter disease.  No acute intracranial [place], Sinus mucosal inflammatory change  -MRI when stable  -neurologist onboard    Sepsis secondary to E Coli UTI POA  -on zosyn and vancomycin, meropenem is added  -urine cx  grow e coli, pseudomanas sp  -blood cx  no growth    NSTEMI  -aspirin switched to supp, on plavix, fish oil, iv labetalol and nitrodrip  -cardiologist on board    Acute systolic CHF NYHA Class IV  -on iv labetalol,  ARB is held due to renal insufficiency   -monitor I/O and daily weight    JEROD on CKD stage III  -low urine out put  -bumex 2 mg iv bid  -monitor renal function, urine output  -nephrologist on board    Possible metastatic left breast cancer  -on armidex   -Hem/Onc on board    HTN  -on iv metoprolol, nitro gtt, received iv hydralazine, monitor BP    DM-II  -continue insulin sliding scale, monitor finger stick glucose    Hypothyroidism  -continue synthroid    Hx of GERD  -continue pepcid    Elevated liver enzyme   -possible related to liver lesion, monitor LFT    Anemia   -H/H stable, monitor cbc    Hx of colon cancer 25 years ago  -according to son, there is a work-up planned with [facility] regarding the possibility of recurrent CA (based on lesions seen in the calvarium on MRI - 3/19). -hem/onc on board    Code status: PAR, No CPR but ok for ACLS/BLS meds, shock and intubation  Case discussed with her son at bedside, at risk for decompensation,  consult to palliative care team.     Code status: PARTIAL   DVT prophylaxis:SCD    Care Plan discussed with: Patient/Family and Nurse  Disposition: Transfer to ICU      Case discussed with her sonPedro Pablo , questions answered     Hospital Problems  Date Reviewed: 4/16/2018          Codes Class Noted POA    Acute systolic heart failure ([facility] [address] ICD-10-CM: I50.21  ICD-9-CM: 428.21  4/24/2018 Unknown        Altered mental status, unspecified ICD-10-CM: R41.82  ICD-9-CM: 780.97  4/23/2018 Unknown        * (Principal)SOB (shortness of breath) ICD-10-CM: R06.02  ICD-9-CM: 786.05  4/16/2018 Unknown                Vital Signs:    Last 24hrs VS reviewed since prior progress note.  Most recent are:  Visit Vitals    BP (!) 168/98    Pulse (!) 106    Temp 98.7 °F (37.1 °C)    Resp 24    Ht 5' 1\" (1.549 m)    Wt 60.4 kg (133 lb 2.5 oz)    SpO2 98%    BMI 25.16 kg/m2         Intake/Output Summary (Last 24 hours) at 04/25/18 1138  Last data filed at 04/25/18 1115   Gross per 24 hour   Intake           585.98 ml   Output             2990 ml   Net         -2404.02 ml        Physical Examination:             Constitutional: On BiPAP, not in distress, cooperative, pleasant    ENT:  Oral mucous moist, oropharynx benign. Neck supple,    Resp:  Decrease bronchial breath sound, few wheezing and rhonic bilaterally. No accessory muscle use   CV:  Regular rhythm, normal rate, no murmurs, gallops, rubs    GI:  Soft, non distended, non tender. normoactive bowel sounds, no hepatosplenomegaly     Musculoskeletal:  pedal edema    Neurologic:  Conscious and alert,  follows simple command     Skin:  Good turgor, no rashes or ulcers       Data Review:    Review and/or order of clinical lab test      Labs:     Recent Labs      04/25/18 0453 04/24/18 0319   WBC  20.2*  20.8*   HGB  7.8*  8.2*   HCT  24.9*  26.2*   PLT  409*  422*     Recent Labs      04/25/18 0453 04/24/18 0319 04/23/18   1500  04/23/18 0432   NA  142  138  140   --   141   K  2.9*  3.1*  3.1*  3.1*  2.5*   CL  105  103  104   --   104   CO2  25 23  25   --   26   BUN  48*  50*  53*   --   48*   CREA  1.86*  1.86*  1.79*   --   1.74*   GLU  231*  188*  192*   --   107*   CA  10.0  10.2*  10.2*   --   9.9   MG  1.9  1.8   --   1.8   PHOS   --   4.0   --   3.7     Recent Labs      04/24/18 0319   SGOT  39*   ALT  27   AP  304*   TBILI  0.6   TP  7.8   ALB  2.9*   GLOB  4.9*     No results for input(s): INR, PTP, APTT in the last 72 hours. No lab exists for component: INREXT, INREXT   No results for input(s): FE, TIBC, PSAT, FERR in the last 72 hours. No results found for: FOL, RBCF   No results for input(s): PH, PCO2, PO2 in the last 72 hours.   Recent Labs      04/24/18 0319   TROIQ  1.88*     Lab Results   Component Value Date/Time    Cholesterol, total 74 04/16/2018 04:21 AM    HDL Cholesterol 25 04/16/2018 04:21 AM    LDL, calculated 31.6 04/16/2018 04:21 AM    Triglyceride 87 04/16/2018 04:21 AM    CHOL/HDL Ratio 3.0 04/16/2018 04:21 AM     Lab Results   Component Value Date/Time    Glucose (POC) 261 (H) 04/25/2018 07:13 AM    Glucose (POC) 151 (H) 04/24/2018 09:13 PM    Glucose (POC) 222 (H) 04/24/2018 04:18 PM    Glucose (POC) 225 (H) 04/24/2018 11:37 AM    Glucose (POC) 212 (H) 04/24/2018 06:02 AM     Lab Results   Component Value Date/Time    Color YELLOW/STRAW 04/20/2018 04:30 AM    Appearance TURBID (A) 04/20/2018 04:30 AM    Specific gravity 1.029 04/20/2018 04:30 AM    pH (UA) 5.0 04/20/2018 04:30 AM    Protein 100 (A) 04/20/2018 04:30 AM    Glucose NEGATIVE  04/20/2018 04:30 AM    Ketone NEGATIVE  04/20/2018 04:30 AM    Bilirubin NEGATIVE  04/20/2018 04:30 AM    Urobilinogen 0.2 04/20/2018 04:30 AM    Nitrites NEGATIVE  04/20/2018 04:30 AM    Leukocyte Esterase LARGE (A) 04/20/2018 04:30 AM    Epithelial cells FEW 04/20/2018 04:30 AM    Bacteria 3+ (A) 04/20/2018 04:30 AM    WBC >100 (H) 04/20/2018 04:30 AM    RBC 5-10 04/20/2018 04:30 AM         Medications Reviewed:     Current Facility-Administered Medications   Medication Dose Route Frequency    hydrALAZINE (APRESOLINE) 20 mg/mL injection 10 mg  10 mg IntraVENous Q6H PRN    aspirin chewable tablet 81 mg  81 mg Oral DAILY    labetalol (NORMODYNE;TRANDATE) 300 mg in 0.9% sodium chloride 150 mL (2 mg / 1 mL) infusion  0.5-2 mg/min IntraVENous TITRATE    meropenem (MERREM) 500 mg in 0.9% sodium chloride (MBP/ADV) 50 mL  0.5 g IntraVENous Q12H    [START ON 4/26/2018] levoFLOXacin (LEVAQUIN) 500 mg in D5W IVPB  500 mg IntraVENous Q48H    bumetanide (BUMEX) injection 2 mg  2 mg IntraVENous Q12H    sodium chloride (NS) flush 10-30 mL  10-30 mL InterCATHeter PRN    sodium chloride (NS) flush 10 mL  10 mL InterCATHeter Q24H    sodium chloride (NS) flush 10 mL  10 mL InterCATHeter PRN    sodium chloride (NS) flush 10-40 mL  10-40 mL InterCATHeter Q8H    sodium chloride (NS) flush 20 mL  20 mL InterCATHeter Q24H    alteplase (CATHFLO) 1 mg in sterile water (preservative free) 1 mL injection  1 mg InterCATHeter PRN    anastrozole (ARIMIDEX) tablet 1 mg  1 mg Oral DAILY    0.9% sodium chloride infusion 250 mL  250 mL IntraVENous PRN    acetaminophen (TYLENOL) tablet 650 mg  650 mg Oral Q4H PRN    epoetin celio (EPOGEN;PROCRIT) injection 10,000 Units  10,000 Units SubCUTAneous Q MON, WED & FRI    albuterol-ipratropium (DUO-NEB) 2.5 MG-0.5 MG/3 ML  3 mL Nebulization Q6H PRN    glucose chewable tablet 16 g  4 Tab Oral PRN    dextrose (D50W) injection syrg 12.5-25 g  12.5-25 g IntraVENous PRN    glucagon (GLUCAGEN) injection 1 mg  1 mg IntraMUSCular PRN    insulin lispro (HUMALOG) injection   SubCUTAneous AC&HS    prochlorperazine (COMPAZINE) with saline injection 5 mg  5 mg IntraVENous Q8H PRN    ascorbic acid (vitamin C) (VITAMIN C) tablet 500 mg  500 mg Oral DAILY    cholecalciferol (VITAMIN D3) tablet 1,000 Units  1,000 Units Oral DAILY    clopidogrel (PLAVIX) tablet 75 mg  75 mg Oral DAILY    folic acid (FOLVITE) tablet 1 mg  1 mg Oral DAILY    magnesium oxide (MAG-OX) tablet 400 mg  400 mg Oral DAILY    therapeutic multivitamin (THERAGRAN) tablet 1 Tab  1 Tab Oral DAILY    fish oil-omega-3 fatty acids 340-1,000 mg capsule 1 Cap  1 Cap Oral DAILY    sodium chloride (NS) flush 5-10 mL  5-10 mL IntraVENous Q8H    sodium chloride (NS) flush 5-10 mL  5-10 mL IntraVENous PRN    acetaminophen (TYLENOL) tablet 650 mg  650 mg Oral Q4H PRN    ondansetron (ZOFRAN) injection 4 mg  4 mg IntraVENous Q4H PRN    levothyroxine (SYNTHROID) tablet 88 mcg  88 mcg Oral ACB     ______________________________________________________________________  EXPECTED LENGTH OF STAY: 4d 12h  ACTUAL LENGTH OF STAY:          9                 Imelda Montejo MD

## 2018-04-25 NOTE — DIABETES MGMT
DTC Progress Note    Recommendations/ Comments: Chart reviewed due to hyperglycemia. Blood sugars mostly >180 and pt has required 11 units of correction insulin in the last 24 hours. If appropriate, please consider:   - Adding Lantus 12 units while patient not eating   - Once patient has resumed a normal diet, consider adding Glimepiride 1 mg with breakfast    Current hospital DM medication: correction scale Humalog, normal sensitivity. Chart reviewed on Sandy Ledesma. Patient is a 82 y.o. female with known Type 2 Diabetes on Januvia 50 mg/d; Amaryl 1 mg/d; Metformin 1000 mg BID at home. A1c:   Lab Results   Component Value Date/Time    Hemoglobin A1c 7.0 (H) 04/18/2018 08:14 AM    Hemoglobin A1c 6.9 (H) 11/30/2011 08:30 AM       Recent Glucose Results:   Lab Results   Component Value Date/Time     (H) 04/25/2018 04:53 AM    GLUCPOC 223 (H) 04/25/2018 11:40 AM    GLUCPOC 261 (H) 04/25/2018 07:13 AM    GLUCPOC 151 (H) 04/24/2018 09:13 PM        Lab Results   Component Value Date/Time    Creatinine 1.86 (H) 04/25/2018 04:53 AM     Estimated Creatinine Clearance: 19.8 mL/min (based on Cr of 1.86). Active Orders   Diet    DIET CARDIAC Regular; Consistent Carb 1800kcal    DIET NPO        PO intake:   Patient Vitals for the past 72 hrs:   % Diet Eaten   04/25/18 0800 0 %   04/23/18 1839 50 %   04/23/18 1500 50 %   04/22/18 1800 50 %       Will continue to follow as needed.     Thank you  Kaitlin Lopez, MS, RN, CDE

## 2018-04-25 NOTE — PROGRESS NOTES
0815Skyler Ramon lung, plan to bronch patient today while on [place]. 0945: Bronch completed. 1110: L rales heard. BIPAP on 14/6 30%. Labetalol started. Nitro d/c'ed. 1600: 4L NC. 1st enema given. 1630: chest xray. 1700: PICC team at bedside. 1725: R PICC placed. 1740: 2nd enema given    MRI postponed right now b/c patient is on BIPAP.

## 2018-04-25 NOTE — PROGRESS NOTES
1930 Bedside and Verbal shift change report given to Afsaneh Collins RN (oncoming nurse) by Reza Agustin (offgoing nurse). Report included the following information SBAR, Kardex, ED Summary, OR Summary, Procedure Summary, Intake/Output, MAR, Accordion, Recent Results, Cardiac Rhythm Sinus Tach and Alarm Parameters . 0730 Bedside and Verbal shift change report given to Laura Paredes RN (oncoming nurse) by Afsaneh Collins RN (offgoing nurse). Report included the following information SBAR, Kardex, ED Summary, OR Summary, Procedure Summary, Intake/Output, MAR, Accordion, Recent Results, Cardiac Rhythm Sinus Tach and Alarm Parameters .

## 2018-04-25 NOTE — PROGRESS NOTES
Hematology-Oncology Progress Note    Samm Travis  1935  [id number]  4/25/2018    Follow-up for: abnormal brain MRI     [x]        Chart notes since last visit reviewed   [x]        Medications reviewed for allergies and interactions       Case discussed with the following:         []                            [x]        Nursing Staff                                                                         []        Pathologist                                                                        [x]        FAMILY      Subjective:     Spoke with patient who complains of: \"about the same\"    Objective:     Patient Vitals for the past 24 hrs:   BP Temp Pulse Resp SpO2 Weight   04/25/18 0726 131/72 - (!) 102 - - -   04/25/18 0700 144/76 - (!) 106 27 96 % -   04/25/18 0600 139/84 - (!) 106 (!) 31 91 % -   04/25/18 0500 162/84 - (!) 107 30 100 % -   04/25/18 0457 (!) 160/96 - (!) 102 - - -   04/25/18 0400 157/85 97.7 °F (36.5 °C) (!) 106 30 100 % 60.4 kg (133 lb 2.5 oz)   04/25/18 0300 146/76 - (!) 110 29 100 % -   04/25/18 0200 159/86 - (!) 107 27 100 % -   04/25/18 0100 160/81 - (!) 105 24 100 % -   04/25/18 0000 150/77 97.8 °F (36.6 °C) 97 28 100 % -   04/24/18 2352 169/90 - (!) 115 - - -   04/24/18 2300 152/80 - (!) 109 26 100 % -   04/24/18 2200 148/72 - (!) 107 23 96 % -   04/24/18 2109 140/75 - (!) 105 - - -   04/24/18 2100 140/75 - (!) 103 21 100 % -   04/24/18 2000 175/76 97.9 °F (36.6 °C) (!) 104 23 100 % -   04/24/18 1900 161/84 - 96 22 100 % -   04/24/18 1800 147/79 - 95 27 100 % -   04/24/18 1700 155/78 - 93 18 100 % -   04/24/18 1600 144/78 97.8 °F (36.6 °C) (!) 105 23 100 % -   04/24/18 1500 125/56 - 92 17 100 % -   04/24/18 1400 137/72 - (!) 106 20 94 % -   04/24/18 1300 124/58 - 91 19 100 % -   04/24/18 1237 - - - - 100 % -   04/24/18 1229 - - - - 100 % -   04/24/18 1200 - - 82 23 97 % -   04/24/18 1149 144/74 98 °F (36.7 °C) (!) 103 28 99 % -   04/24/18 1131 142/65 - 95 - - -   04/24/18 1111 178/71 - (!) 112 - - -   04/24/18 1101 175/84 - - - - -   04/24/18 1048 191/86 - (!) 114 - - -   04/24/18 1013 (!) 175/98 - (!) 115 - - -   04/24/18 1010 (!) 175/98 - - - - -   04/24/18 0925 169/87 - (!) 112 - - -       REVIEW OF SYSTEMS:    Constitutional: negative fever, negative chills, negative weight loss  Eyes:   negative visual changes  ENT:   negative sore throat, tongue or lip swelling  Respiratory:  negative cough, negative dyspnea  Cards:  negative for chest pain, palpitations, lower extremity edema  GI:   negative for nausea, vomiting, diarrhea, and abdominal pain  Neuro:  negative for headaches, dizziness, vertigo  []                        Full ROS o/w normal/non contributor    Constitutional:  Patient looks  [x]        Sick  []        Frail  []        Better                                                 []        Depressed    HEENT:  [x]   NC                         []   AT               []    ALOPECIA           Eyes: [x]   Normal               []    Icteric  Oropharynx: []    Normal                  []  Thrush               []   Dry  Mucositis: []    None                 Grade: []        I  []        II  []        III  []        IV  Neck:   [x]   Supple                  []  Rigid      Breast.. Left upper outer quadrant mass            Lungs clear anteriorly  Cor tachy  Abd.  Soft non tender  Ext, no edema  Skin:  Intact [x]           Purpura []        Rash: [x]   ABSENT       []  PRESENT  Neuro:  []        Normal  [x]        Confused lethargic    Available labs reviewed:  Labs:    Recent Results (from the past 24 hour(s))   GLUCOSE, POC    Collection Time: 04/24/18 11:37 AM   Result Value Ref Range    Glucose (POC) 225 (H) 65 - 100 mg/dL    Performed by Shannen JOHNSTON    POC G3 - PUL    Collection Time: 04/24/18 12:27 PM   Result Value Ref Range    pH (POC) 7.394 7.35 - 7.45      pCO2 (POC) 41.0 35.0 - 45.0 MMHG    pO2 (POC) 87 80 - 100 MMHG    HCO3 (POC) 25.1 22 - 26 MMOL/L    sO2 (POC) 97 92 - 97 %    Base excess (POC) 0 mmol/L    Site RIGHT RADIAL      Device: NASAL CANNULA      Flow rate (POC) 2 L/M    Allens test (POC) YES      Specimen type (POC) ARTERIAL     GLUCOSE, POC    Collection Time: 04/24/18  4:18 PM   Result Value Ref Range    Glucose (POC) 222 (H) 65 - 100 mg/dL    Performed by Bryanna Tello    GLUCOSE, POC    Collection Time: 04/24/18  9:13 PM   Result Value Ref Range    Glucose (POC) 151 (H) 65 - 100 mg/dL    Performed by Mario Alberto Aguilar    METABOLIC PANEL, BASIC    Collection Time: 04/25/18  4:53 AM   Result Value Ref Range    Sodium 142 136 - 145 mmol/L    Potassium 2.9 (L) 3.5 - 5.1 mmol/L    Chloride 105 97 - 108 mmol/L    CO2 25 21 - 32 mmol/L    Anion gap 12 5 - 15 mmol/L    Glucose 231 (H) 65 - 100 mg/dL    BUN 48 (H) 6 - 20 MG/DL    Creatinine 1.86 (H) 0.55 - 1.02 MG/DL    BUN/Creatinine ratio 26 (H) 12 - 20      GFR est AA 32 (L) >60 ml/min/1.73m2    GFR est non-AA 26 (L) >60 ml/min/1.73m2    Calcium 10.0 8.5 - 10.1 MG/DL   CBC WITH AUTOMATED DIFF    Collection Time: 04/25/18  4:53 AM   Result Value Ref Range    WBC 20.2 (H) 3.6 - 11.0 K/uL    RBC 2.72 (L) 3.80 - 5.20 M/uL    HGB 7.8 (L) 11.5 - 16.0 g/dL    HCT 24.9 (L) 35.0 - 47.0 %    MCV 91.5 80.0 - 99.0 FL    MCH 28.7 26.0 - 34.0 PG    MCHC 31.3 30.0 - 36.5 g/dL    RDW 23.9 (H) 11.5 - 14.5 %    PLATELET 370 (H) 369 - 400 K/uL    MPV 9.8 8.9 - 12.9 FL    NRBC 1.1 (H) 0  WBC    ABSOLUTE NRBC 0.23 (H) 0.00 - 0.01 K/uL    NEUTROPHILS 86 (H) 32 - 75 %    LYMPHOCYTES 7 (L) 12 - 49 %    MONOCYTES 4 (L) 5 - 13 %    EOSINOPHILS 0 0 - 7 %    BASOPHILS 0 0 - 1 %    METAMYELOCYTES 2 (H) 0 %    MYELOCYTES 1 (H) 0 %    IMMATURE GRANULOCYTES 0 %    ABS. NEUTROPHILS 17.4 (H) 1.8 - 8.0 K/UL    ABS. LYMPHOCYTES 1.4 0.8 - 3.5 K/UL    ABS. MONOCYTES 0.8 0.0 - 1.0 K/UL    ABS. EOSINOPHILS 0.0 0.0 - 0.4 K/UL    ABS. BASOPHILS 0.0 0.0 - 0.1 K/UL    ABS. IMM.  GRANS. 0.0 K/UL    DF MANUAL      RBC COMMENTS ANISOCYTOSIS  2+        RBC COMMENTS POLYCHROMASIA  1+        RBC COMMENTS OVALOCYTES  PRESENT        RBC COMMENTS HYPOCHROMIA  1+        RBC COMMENTS TARGET CELLS  PRESENT        RBC COMMENTS SPHEROCYTES  PRESENT       MAGNESIUM    Collection Time: 04/25/18  4:53 AM   Result Value Ref Range    Magnesium 1.9 1.6 - 2.4 mg/dL   POC G3 - PUL    Collection Time: 04/25/18  5:05 AM   Result Value Ref Range    pH (POC) 7.360 7.35 - 7.45      pCO2 (POC) 45.5 (H) 35.0 - 45.0 MMHG    pO2 (POC) 81 80 - 100 MMHG    HCO3 (POC) 25.8 22 - 26 MMOL/L    sO2 (POC) 95 92 - 97 %    Base excess (POC) 0 mmol/L    Site RIGHT RADIAL      Device: NASAL CANNULA      Flow rate (POC) 6 L/M    Allens test (POC) N/A      Specimen type (POC) ARTERIAL     GLUCOSE, POC    Collection Time: 04/25/18  7:13 AM   Result Value Ref Range    Glucose (POC) 261 (H) 65 - 100 mg/dL    Performed by Mario Randall        Available Xrays reviewed:    Chemotherapy monitored and toxicities assessed:    Assessment and Plan   1. Probable metastatic breast cancer. The bone scan shows diffuse mets, the [id number] anc cea are elevated and the pt has a palpable left breast mass,,,she had a biopsy done in Ultrasound 4/23,,suspect it will be ER + given the duration of mass in left breast ,   If so she could very well respond nicely to femara +/- ibrance. . rather than wait a week for path, started arimidex on 4/23). 2. Anemia. .. Pt has bone mets, suspect this is due to chronic disease +- marrow involvement,  started procrit weekly on 4/23,,, given one unit prbc 4/23 as well, count slowly downtrending, 7.8 today, follow      3. CHF. Keiraraine Arik Per cardiology. .   4. AMS. . Secondary to ativan +/- hypoxia other causes?,,, She has no evidence of brain mets on MRI ,but does have calvarial mets    4.    Code status,,,  they dont want CPR but do want supportive care up to and including intubation if necessary    Ac Cartagena MD

## 2018-04-25 NOTE — CONSULTS
Neurology Progress Note    Patient ID:  Samm Travis  [id number]  82 y.o.  1935    Chief Complaint: Confusion    Subjective:     63-year-old woman with metastatic cancer who continues to have a very precarious respiratory status as well as some associated agitation and confusion. MRI brain has not been repeated yet. Head CT was done without any acute issue. She has been upgraded back to the ICU as of yesterday.     Objective:       ROS:Cannot obtain secondary to patient medical condition      Meds:  Current Facility-Administered Medications   Medication Dose Route Frequency    hydrALAZINE (APRESOLINE) 20 mg/mL injection 10 mg  10 mg IntraVENous Q6H PRN    aspirin chewable tablet 81 mg  81 mg Oral DAILY    labetalol (NORMODYNE;TRANDATE) 300 mg in 0.9% sodium chloride 150 mL (2 mg / 1 mL) infusion  0.5-2 mg/min IntraVENous TITRATE    carvedilol (COREG) tablet 6.25 mg  6.25 mg Oral BID    potassium chloride in water 10 mEq/50 mL IVPB        potassium chloride 10 mEq in 50 ml IVPB  10 mEq IntraVENous Q1H    meropenem (MERREM) 500 mg in 0.9% sodium chloride (MBP/ADV) 50 mL  0.5 g IntraVENous Q12H    [START ON 4/26/2018] levoFLOXacin (LEVAQUIN) 500 mg in D5W IVPB  500 mg IntraVENous Q48H    bumetanide (BUMEX) injection 2 mg  2 mg IntraVENous Q12H    sodium chloride (NS) flush 10-30 mL  10-30 mL InterCATHeter PRN    sodium chloride (NS) flush 10 mL  10 mL InterCATHeter Q24H    sodium chloride (NS) flush 10 mL  10 mL InterCATHeter PRN    sodium chloride (NS) flush 10-40 mL  10-40 mL InterCATHeter Q8H    sodium chloride (NS) flush 20 mL  20 mL InterCATHeter Q24H    alteplase (CATHFLO) 1 mg in sterile water (preservative free) 1 mL injection  1 mg InterCATHeter PRN    anastrozole (ARIMIDEX) tablet 1 mg  1 mg Oral DAILY    0.9% sodium chloride infusion 250 mL  250 mL IntraVENous PRN    acetaminophen (TYLENOL) tablet 650 mg  650 mg Oral Q4H PRN    epoetin celio (EPOGEN;PROCRIT) injection 10,000 Units 10,000 Units SubCUTAneous Q MON, WED & FRI    albuterol-ipratropium (DUO-NEB) 2.5 MG-0.5 MG/3 ML  3 mL Nebulization Q6H PRN    glucose chewable tablet 16 g  4 Tab Oral PRN    dextrose (D50W) injection syrg 12.5-25 g  12.5-25 g IntraVENous PRN    glucagon (GLUCAGEN) injection 1 mg  1 mg IntraMUSCular PRN    insulin lispro (HUMALOG) injection   SubCUTAneous AC&HS    prochlorperazine (COMPAZINE) with saline injection 5 mg  5 mg IntraVENous Q8H PRN    ascorbic acid (vitamin C) (VITAMIN C) tablet 500 mg  500 mg Oral DAILY    cholecalciferol (VITAMIN D3) tablet 1,000 Units  1,000 Units Oral DAILY    clopidogrel (PLAVIX) tablet 75 mg  75 mg Oral DAILY    folic acid (FOLVITE) tablet 1 mg  1 mg Oral DAILY    magnesium oxide (MAG-OX) tablet 400 mg  400 mg Oral DAILY    therapeutic multivitamin (THERAGRAN) tablet 1 Tab  1 Tab Oral DAILY    fish oil-omega-3 fatty acids 340-1,000 mg capsule 1 Cap  1 Cap Oral DAILY    sodium chloride (NS) flush 5-10 mL  5-10 mL IntraVENous Q8H    sodium chloride (NS) flush 5-10 mL  5-10 mL IntraVENous PRN    acetaminophen (TYLENOL) tablet 650 mg  650 mg Oral Q4H PRN    ondansetron (ZOFRAN) injection 4 mg  4 mg IntraVENous Q4H PRN    levothyroxine (SYNTHROID) tablet 88 mcg  88 mcg Oral ACB       MRI Results (maximum last 3): Results from [place] encounter on 04/16/18   MRI ABD W WO CONT   Narrative MRI ABDOMEN AND MRCP WITH AND WITHOUT CONTRAST. INDICATION: Liver lesion on previous imaging    COMPARISON: None. TECHNIQUE: Multisequence and multiplanar MRI of the abdomen was performed after  the administration of 10 mL of MultiHance gadolinium IV contrast. Images were  obtained without contrast; and in late arterial, portal venous, and delayed  phases after the administration of contrast. Subtraction images were  reconstructed.     Heavily T2-weighted thick slab and thin slice images were obtained in the  oblique coronal plane through the biliary tree (MRCP). FINDINGS:     MRCP: No pancreatic or biliary ductal dilation. No stricture or  choledocholithiasis. LIVER: Suboptimal postcontrast imaging secondary to motion. 2 subcentimeter T2  hyperintense foci are noted in the right lobe, most likely representing tiny  cysts. There are multiple suspicious lesions seen on the diffusion weighted  sequences in the right and left hepatic lobes. The largest of these measures  approximately 16mm on series 13 image 51. Many of these have subtle associated  T2 hyperintense signal. Metastatic disease is suspected. GALLBLADDER: Contracted. No gallstones. PANCREAS: Normal.  SPLEEN: Normal.  ADRENALS: Normal.  KIDNEYS: Normal.  DISTAL ESOPHAGUS: Normal.  STOMACH AND DUODENUM: Normal.  VISUALIZED SMALL BOWEL AND COLON: Normal.  PERITONEUM: No free fluid and no abdominal lymphadenopathy. VISUALIZED LUNG BASES: Small bilateral pleural effusions are noted with  associated atelectasis/consolidation in both lower lobes. BONES: Widespread osseous metastases are noted. CHEST WALL: There is a spiculated mass in the central aspect of the left breast  measuring 3.5 x 3.2 x 2.8 cm. A smaller discrete mass measuring 0.9 x 1.3 cm  seen medially in the left breast. Multiple other areas of concerning areas of  enhancement are noted in the left breast, not well assessed on the current  study. Impression IMPRESSION:   1. Large, irregular spiculated mass in the left breast. Adjacent smaller mass  also noted, as well as multiple other areas of suspicious enhancement, which are  not well assessed on the current study. Findings are highly suggestive of a  primary breast malignancy. 2. Suboptimal postcontrast imaging secondary to motion. Diffusion-weighted  imaging demonstrates multiple lesions in the liver. Metastatic disease is  suspected. Widespread osseous metastases are also noted.  Although these findings  could be related to patient's known colon cancer, metastatic breast cancer is  more likely. 3. Small bilateral pleural effusions with associated collapse/consolidation in  the lower lobes. Results from [facility] encounter on 03/19/18   MRI BRAIN W WO CONT   Narrative EXAM: MRI BRAIN W WO CONT    TECHNIQUE: Sagittal and axial T1-weighted images axial FLAIR, T2-weighted,  diffusion weighted, precontrast,  Axial thin section cranial nerve cisternal  images, thin section axial and coronal T1-weighted and fat-suppressed  postcontrast T1-weighted images of the skull base, axial postcontrast images of  the head      IV CONTRAST:  11 cc ProHance    CLINICAL INFORMATION:  Sensorineural hearing loss, bilateral, Benign paroxysmal  vertigo, left ear    COMPARISON:  CT head of 2/9/2018    FINDINGS:  EXTRA-AXIAL SPACES: Prominent cortical sulci, consistent with cerebral volume  loss. Prominence of the sylvian fissures and basal cisterns. INTRACRANIAL HEMORRHAGE: None. VENTRICULAR SYSTEM:  Normal in size and morphology for the patient's age. BASAL CISTERNS:  Normal.  CEREBRAL PARENCHYMA:  Extensive scattered and confluent foci of FLAIR/T2  hyperintensity in the cerebral white matter, most likely due to intracranial  small vessel disease. No enhancing lesions. No evidence of diffusion  restriction. MIDLINE SHIFT: None. CEREBELLUM:  Inferior vermian hypoplasia. Mild volume loss. BRAINSTEM:  Diffusely atrophic. Chronic bilateral pontine lacunar infarcts,  right more extensive than left. CALVARIUM:    1. Multiple scattered T1 hypointense foci in the calvarium, suspicious for  metastatic disease. Not identified as destructive lesions on recent head CT. VASCULAR SYSTEM:  Normal flow voids. PARANASAL SINUSES AND MASTOID AIR CELLS:  Clear. VISUALIZED ORBITS:  Previous bilateral cataract surgery. VISUALIZED UPPER CERVICAL SPINE:  Normal.  SELLA:  Normal.  SKULL BASE:  Normal. No cranial nerve thickening or abnormal enhancement, but  some images compromised by patient motion. Impression IMPRESSION:    1. No acute findings. No evidence of vestibular schwannoma. 2. Extensive cerebral white matter signal abnormality and chronic pontine  infarcts, most likely due to chronic small vessel ischemic change. Cerebral  volume loss and prominent brainstem volume loss. 3. Areas of focal abnormality within the calvarium bilaterally, of concern for  metastatic disease. Correlation with radionuclide bone scan recommended.     23X              Lab Review   Recent Results (from the past 24 hour(s))   GLUCOSE, POC    Collection Time: 04/24/18  4:18 PM   Result Value Ref Range    Glucose (POC) 222 (H) 65 - 100 mg/dL    Performed by Barbara Klein    GLUCOSE, POC    Collection Time: 04/24/18  9:13 PM   Result Value Ref Range    Glucose (POC) 151 (H) 65 - 100 mg/dL    Performed by [address] [place] [address], [facility]    Collection Time: 04/25/18  4:53 AM   Result Value Ref Range    Sodium 142 136 - 145 mmol/L    Potassium 2.9 (L) 3.5 - 5.1 mmol/L    Chloride 105 97 - 108 mmol/L    CO2 25 21 - 32 mmol/L    Anion gap 12 5 - 15 mmol/L    Glucose 231 (H) 65 - 100 mg/dL    BUN 48 (H) 6 - 20 MG/DL    Creatinine 1.86 (H) 0.55 - 1.02 MG/DL    BUN/Creatinine ratio 26 (H) 12 - 20      GFR est AA 32 (L) >60 ml/min/1.73m2    GFR est non-AA 26 (L) >60 ml/min/1.73m2    Calcium 10.0 8.5 - 10.1 MG/DL   CBC WITH AUTOMATED DIFF    Collection Time: 04/25/18  4:53 AM   Result Value Ref Range    WBC 20.2 (H) 3.6 - 11.0 K/uL    RBC 2.72 (L) 3.80 - 5.20 M/uL    HGB 7.8 (L) 11.5 - 16.0 g/dL    HCT 24.9 (L) 35.0 - 47.0 %    MCV 91.5 80.0 - 99.0 FL    MCH 28.7 26.0 - 34.0 PG    MCHC 31.3 30.0 - 36.5 g/dL    RDW 23.9 (H) 11.5 - 14.5 %    PLATELET 877 (H) 361 - 400 K/uL    MPV 9.8 8.9 - 12.9 FL    NRBC 1.1 (H) 0  WBC    ABSOLUTE NRBC 0.23 (H) 0.00 - 0.01 K/uL    NEUTROPHILS 86 (H) 32 - 75 %    LYMPHOCYTES 7 (L) 12 - 49 %    MONOCYTES 4 (L) 5 - 13 %    EOSINOPHILS 0 0 - 7 %    BASOPHILS 0 0 - 1 %    METAMYELOCYTES 2 (H) 0 %    MYELOCYTES 1 (H) 0 %    IMMATURE GRANULOCYTES 0 %    ABS. NEUTROPHILS 17.4 (H) 1.8 - 8.0 K/UL    ABS. LYMPHOCYTES 1.4 0.8 - 3.5 K/UL    ABS. MONOCYTES 0.8 0.0 - 1.0 K/UL    ABS. EOSINOPHILS 0.0 0.0 - 0.4 K/UL    ABS. BASOPHILS 0.0 0.0 - 0.1 K/UL    ABS. IMM. GRANS. 0.0 K/UL    DF MANUAL      RBC COMMENTS ANISOCYTOSIS  2+        RBC COMMENTS POLYCHROMASIA  1+        RBC COMMENTS OVALOCYTES  PRESENT        RBC COMMENTS HYPOCHROMIA  1+        RBC COMMENTS TARGET CELLS  PRESENT        RBC COMMENTS SPHEROCYTES  PRESENT       MAGNESIUM    Collection Time: 04/25/18  4:53 AM   Result Value Ref Range    Magnesium 1.9 1.6 - 2.4 mg/dL   POC G3 - PUL    Collection Time: 04/25/18  5:05 AM   Result Value Ref Range    pH (POC) 7.360 7.35 - 7.45      pCO2 (POC) 45.5 (H) 35.0 - 45.0 MMHG    pO2 (POC) 81 80 - 100 MMHG    HCO3 (POC) 25.8 22 - 26 MMOL/L    sO2 (POC) 95 92 - 97 %    Base excess (POC) 0 mmol/L    Site RIGHT RADIAL      Device: NASAL CANNULA      Flow rate (POC) 6 L/M    Allens test (POC) N/A      Specimen type (POC) ARTERIAL     GLUCOSE, POC    Collection Time: 04/25/18  7:13 AM   Result Value Ref Range    Glucose (POC) 261 (H) 65 - 100 mg/dL    Performed by Yousif Flores, OTHER SOURCES    Collection Time: 04/25/18  9:40 AM   Result Value Ref Range    Special Requests: NO SPECIAL REQUESTS      PATEL RARE  BUDDING YEAST       GLUCOSE, POC    Collection Time: 04/25/18 11:40 AM   Result Value Ref Range    Glucose (POC) 223 (H) 65 - 100 mg/dL    Performed by Duarte Smith        Additional comments:I reviewed the patients new imaging test results.      Patient Vitals for the past 8 hrs:   BP Temp Pulse Resp SpO2   04/25/18 1200 - 98 °F (36.7 °C) - - -   04/25/18 1055 - - - - 98 %   04/25/18 1033 (!) 168/98 - - - -   04/25/18 1027 171/88 - - - -   04/25/18 0943 148/77 - (!) 106 24 95 %   04/25/18 0935 147/72 - (!) 113 28 96 %   04/25/18 0930 160/85 - (!) 104 18 98 %   04/25/18 0900 164/89 98.7 °F (37.1 °C) (!) 107 21 100 %   04/25/18 0830 156/84 - (!) 109 25 99 %   04/25/18 0800 146/75 - (!) 104 23 100 %   04/25/18 0726 131/72 - (!) 102 - -   04/25/18 0700 144/76 - (!) 106 27 96 %   04/25/18 0600 139/84 - (!) 106 (!) 31 91 %       04/25 0701 - 04/25 1900  In: 138.5 [I.V.:138.5]  Out: 700 [Urine:700]  04/23 1901 - 04/25 0700  In: 547.5 [I.V.:547.5]  Out: 2290 [Urine:2290]    Exam:  Visit Vitals    BP (!) 168/98    Pulse (!) 106    Temp 98 °F (36.7 °C)    Resp 24    Ht 5' 1\" (1.549 m)    Wt 60.4 kg (133 lb 2.5 oz)    SpO2 98%    BMI 25.16 kg/m2     Gen: Well developed  CV: RRR  Lungs: +labored breathing  Abd: soft, non distended  Neuro: She is awake but seems very obtunded and not following commands. CN II-XII: PERRL,  face grossly symmetric  Motor: Minimal movement of the extremities  Sensory: Unreliable testing  DTRs: symmetric  COOR: no limb/truncal ataxia  Gait: Deferred due to condition she has been upgraded back to the ICU as of yesterday.     PROBLEM LIST:     Patient Active Problem List   Diagnosis Code    DM [facility]di[facility] mellitus) [facility] E11.9    Hypothyroid E03.9    Colon cancer ([facility] C18.9    H/O: CVA     Microalbuminuria R80.9    Age-related osteoporosis without current pathological fracture M81.0    Essential hypertension I10    Anemia D64.9    Hyperlipidemia E78.5    Carotid bruit R09.89    Claudication (HCC) I73.9    Weakness of left arm R29.898    PAD (peripheral artery disease) ([facility]) I73.9    Weakness due to cerebrovascular accident [id number]    Neuropathy in diabetes ([facility] E11.40    Occlusion and stenosis of carotid artery without mention of cerebral infarction I65.29    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 Primary issue is her respiratory status. Defer MRI until she is stable to complete the study. I discussed with the son the neurologic plan as well. Palliative is involved. I will follow peripherally. Please call with any questions.       Signed:  [address] [facility], DO  4/25/2018  1:14 PM

## 2018-04-25 NOTE — PROGRESS NOTES
Cardiology Progress Note            Admit Date: 4/16/2018  Admit Diagnosis: SOB (shortness of breath)  Date: 4/25/2018     Time: 2:01 PM    HPI: 82 y.o. Female with new diagnosis of CHF. Presented with chief c/o of SOB, found to have pulmonary edema and EF 40% on TTE. Noted to have NSTEMI and JEROD on CKD. Pt with hx of colon cancer and now concern for metastatic breast CA. Was in ICU over weekend (4/21-4/22) for respiratory failure, sepsis, psa UTI. Improved and transferred to floor 4/24. LHC on hold as probable metastatic disease. Interval hx: Transferred to ICU on 4/23 due to AMS and SOB. Started on nitroglycerine IV for BP control. Left lung opacification on a.m. Chest XRay 4/25/18 - mucous plug, for bronch. Assessment and Plan     1. NSTEMI:     -no chest pain. Medical mgmt given other issues. -12 lead EKG 4/24, marked ST abnormality. -TTE:4/20/18  EF 45%, severe hypokinesis apical walls, mod hypokinesis basal-mid apical walls.   -Continue ASA ,  Plavix when able to take po,    -On metoprolol 5 mg IV q 6 hours with hold parameters- plan to change back to Coreg later today when able to take PO (after bronch)   -Statin intolerant due to weakness on zocor, on repatha, last LDL 31, so will not rechallenge statin     2. Cardiomyopathy/Acute HFrEF, new:  EF 45% NYHA IV  Now   -CXR with improved edema on Rt, Lt with evidence of mucous plugging   -Bumex 2 mg IV BID   -Hold ARB/ACE-I d/t elevated creatinine   -Metoprolol as above- change to coreg today.    -Daily weights, I/Os (net negative 1842/24 hours)       3. JEROD on CKD:   creatinine trended up 1.86 from 1.74  Yesterday   -Baseline creat 0.9-1 mg/dl   -Renal US with nonobstructing L kidney stone. And a hepatic lesion   -Nephrology following   -diuretics per nephrology.       4. Acute respiratory failure: d/t pulmonary edema, now with Left lung mucous plugging.   -diuretics as above   -Pulmonary following- Bronch today    5. Altered mental status: Improved, now awake, alert, confused. -neurology on consult, following   -CT head: no acute findings. 6. Anemia: hgb 8.2 from 7.4 4/23/18   -Off heparin   -management per renal/primary team    7. Malignant HTN- BP elevated   -Continue nitroglycerine drip for now   -Change IV metoprolol to po coreg once able to take po   -add po hydralazine once able to take po    8. Leukocytosis/ pseudomonal UTI, now concern for HCAP.:  Management per pulm/primary team.     9. Hx of Colon cancer, now suspected metastatic breast ca (bone, liver,calvarium mets)  -Hematology oncology following. 10  Hypokalemia:  K=2.9  Repleted by primary team.    11. Advanced directives:  Partial code- no CPR, no shocks. Pt with improved mental status and pulm edema on Rt lung on Chest XRay. New Left lung opacification- for therapeutic bronch this a.m. Will transition to po meds (coreg etc) once able to take po after bronch. Poornima Small. MARIETTA [place] 4/25/2018 0845  Addendum:  Rounded again this afternoon-seen by Dr. Chip Saravia. Pt now on bipap S/p therapeutic bronch. Now on Labetolol for elevated BP,  off Nitroglycerine gtt. Will restart Coreg today, wean labetolol as able. Will repeat Echo in a.m. Cardiology Attending:Patient seen and examined. I agree with NP assessment and plans. Mental status has deteriorated. Annie Pak MD 4/26/2018 2:10 PM       Subjective:   Prakash Rothman denies chest pain. C/o SOB and mild abdominal pain. Objective:      Physical Exam:                Visit Vitals    /77    Pulse (!) 106    Temp 98.7 °F (37.1 °C)    Resp 24    Ht 5' 1\" (1.549 m)    Wt 60.4 kg (133 lb 2.5 oz)    SpO2 95%    BMI 25.16 kg/m2          General Appearance:   , elderly female, appears weak, mildly tachypneic   Ears/Nose/Mouth/Throat:    Hearing grossly normal.         Neck:  Supple.    Chest:     Absent breath sounds Left lung, diminished right base. On high flow O2. Cardiovascular:   Regular rate and rhythm, S1, S2 normal, no murmur,    Abdomen:    Soft, mild distension, bowel sounds present. Extremities:  No edema bilaterally. Skin:  Warm and dry.      Telemetry: Sinus tachycardia          Data Review:    Labs:    Recent Results (from the past 24 hour(s))   GLUCOSE, POC    Collection Time: 04/24/18 11:37 AM   Result Value Ref Range    Glucose (POC) 225 (H) 65 - 100 mg/dL    Performed by Godfrey LE G3 - PUL    Collection Time: 04/24/18 12:27 PM   Result Value Ref Range    pH (POC) 7.394 7.35 - 7.45      pCO2 (POC) 41.0 35.0 - 45.0 MMHG    pO2 (POC) 87 80 - 100 MMHG    HCO3 (POC) 25.1 22 - 26 MMOL/L    sO2 (POC) 97 92 - 97 %    Base excess (POC) 0 mmol/L    Site RIGHT RADIAL      Device: NASAL CANNULA      Flow rate (POC) 2 L/M    Allens test (POC) YES      Specimen type (POC) ARTERIAL     GLUCOSE, POC    Collection Time: 04/24/18  4:18 PM   Result Value Ref Range    Glucose (POC) 222 (H) 65 - 100 mg/dL    Performed by Jimbo Weiss    GLUCOSE, POC    Collection Time: 04/24/18  9:13 PM   Result Value Ref Range    Glucose (POC) 151 (H) 65 - 100 mg/dL    Performed by Thera Asp    METABOLIC PANEL, BASIC    Collection Time: 04/25/18  4:53 AM   Result Value Ref Range    Sodium 142 136 - 145 mmol/L    Potassium 2.9 (L) 3.5 - 5.1 mmol/L    Chloride 105 97 - 108 mmol/L    CO2 25 21 - 32 mmol/L    Anion gap 12 5 - 15 mmol/L    Glucose 231 (H) 65 - 100 mg/dL    BUN 48 (H) 6 - 20 MG/DL    Creatinine 1.86 (H) 0.55 - 1.02 MG/DL    BUN/Creatinine ratio 26 (H) 12 - 20      GFR est AA 32 (L) >60 ml/min/1.73m2    GFR est non-AA 26 (L) >60 ml/min/1.73m2    Calcium 10.0 8.5 - 10.1 MG/DL   CBC WITH AUTOMATED DIFF    Collection Time: 04/25/18  4:53 AM   Result Value Ref Range    WBC 20.2 (H) 3.6 - 11.0 K/uL    RBC 2.72 (L) 3.80 - 5.20 M/uL    HGB 7.8 (L) 11.5 - 16.0 g/dL    HCT 24.9 (L) 35.0 - 47.0 %    MCV 91.5 80.0 - 99.0 FL    MCH 28.7 26.0 - 34.0 PG    MCHC 31.3 30.0 - 36.5 g/dL    RDW 23.9 (H) 11.5 - 14.5 %    PLATELET 474 (H) 356 - 400 K/uL    MPV 9.8 8.9 - 12.9 FL    NRBC 1.1 (H) 0  WBC    ABSOLUTE NRBC 0.23 (H) 0.00 - 0.01 K/uL    NEUTROPHILS 86 (H) 32 - 75 %    LYMPHOCYTES 7 (L) 12 - 49 %    MONOCYTES 4 (L) 5 - 13 %    EOSINOPHILS 0 0 - 7 %    BASOPHILS 0 0 - 1 %    METAMYELOCYTES 2 (H) 0 %    MYELOCYTES 1 (H) 0 %    IMMATURE GRANULOCYTES 0 %    ABS. NEUTROPHILS 17.4 (H) 1.8 - 8.0 K/UL    ABS. LYMPHOCYTES 1.4 0.8 - 3.5 K/UL    ABS. MONOCYTES 0.8 0.0 - 1.0 K/UL    ABS. EOSINOPHILS 0.0 0.0 - 0.4 K/UL    ABS. BASOPHILS 0.0 0.0 - 0.1 K/UL    ABS. IMM.  GRANS. 0.0 K/UL    DF MANUAL      RBC COMMENTS ANISOCYTOSIS  2+        RBC COMMENTS POLYCHROMASIA  1+        RBC COMMENTS OVALOCYTES  PRESENT        RBC COMMENTS HYPOCHROMIA  1+        RBC COMMENTS TARGET CELLS  PRESENT        RBC COMMENTS SPHEROCYTES  PRESENT       MAGNESIUM    Collection Time: 04/25/18  4:53 AM   Result Value Ref Range    Magnesium 1.9 1.6 - 2.4 mg/dL   POC G3 - PUL    Collection Time: 04/25/18  5:05 AM   Result Value Ref Range    pH (POC) 7.360 7.35 - 7.45      pCO2 (POC) 45.5 (H) 35.0 - 45.0 MMHG    pO2 (POC) 81 80 - 100 MMHG    HCO3 (POC) 25.8 22 - 26 MMOL/L    sO2 (POC) 95 92 - 97 %    Base excess (POC) 0 mmol/L    Site RIGHT RADIAL      Device: NASAL CANNULA      Flow rate (POC) 6 L/M    Allens test (POC) N/A      Specimen type (POC) ARTERIAL     GLUCOSE, POC    Collection Time: 04/25/18  7:13 AM   Result Value Ref Range    Glucose (POC) 261 (H) 65 - 100 mg/dL    Performed by Geovany Christiansen           Radiology:        Current Facility-Administered Medications   Medication Dose Route Frequency    potassium chloride 10 mEq in 50 ml IVPB  10 mEq IntraVENous Q1H    dexmedeTOMidine (PRECEDEX) 400 mcg in 0.9% sodium chloride 100 mL infusion  0.2-0.7 mcg/kg/hr IntraVENous TITRATE    hydrALAZINE (APRESOLINE) 20 mg/mL injection 10 mg  10 mg IntraVENous Q6H PRN    nitroglycerin (Tridil) 200 mcg/ml infusion   mcg/min IntraVENous TITRATE    metoprolol (LOPRESSOR) injection 5 mg  5 mg IntraVENous Q6H    meropenem (MERREM) 500 mg in 0.9% sodium chloride (MBP/ADV) 50 mL  0.5 g IntraVENous Q12H    [START ON 4/26/2018] levoFLOXacin (LEVAQUIN) 500 mg in D5W IVPB  500 mg IntraVENous Q48H    bumetanide (BUMEX) injection 2 mg  2 mg IntraVENous Q12H    hydrocortisone Sod Succ (PF) (SOLU-CORTEF) injection 25 mg  25 mg IntraVENous Q8H    sodium chloride (NS) flush 10-30 mL  10-30 mL InterCATHeter PRN    sodium chloride (NS) flush 10 mL  10 mL InterCATHeter Q24H    sodium chloride (NS) flush 10 mL  10 mL InterCATHeter PRN    sodium chloride (NS) flush 10-40 mL  10-40 mL InterCATHeter Q8H    sodium chloride (NS) flush 20 mL  20 mL InterCATHeter Q24H    alteplase (CATHFLO) 1 mg in sterile water (preservative free) 1 mL injection  1 mg InterCATHeter PRN    anastrozole (ARIMIDEX) tablet 1 mg  1 mg Oral DAILY    0.9% sodium chloride infusion 250 mL  250 mL IntraVENous PRN    acetaminophen (TYLENOL) tablet 650 mg  650 mg Oral Q4H PRN    epoetin celio (EPOGEN;PROCRIT) injection 10,000 Units  10,000 Units SubCUTAneous Q MON, WED & FRI    famotidine (PEPCID) tablet 20 mg  20 mg Oral DAILY    albuterol-ipratropium (DUO-NEB) 2.5 MG-0.5 MG/3 ML  3 mL Nebulization Q6H PRN    glucose chewable tablet 16 g  4 Tab Oral PRN    dextrose (D50W) injection syrg 12.5-25 g  12.5-25 g IntraVENous PRN    glucagon (GLUCAGEN) injection 1 mg  1 mg IntraMUSCular PRN    insulin lispro (HUMALOG) injection   SubCUTAneous AC&HS    prochlorperazine (COMPAZINE) with saline injection 5 mg  5 mg IntraVENous Q8H PRN    ascorbic acid (vitamin C) (VITAMIN C) tablet 500 mg  500 mg Oral DAILY    cholecalciferol (VITAMIN D3) tablet 1,000 Units  1,000 Units Oral DAILY    clopidogrel (PLAVIX) tablet 75 mg  75 mg Oral DAILY    folic acid (FOLVITE) tablet 1 mg  1 mg Oral DAILY    magnesium oxide (MAG-OX) tablet 400 mg  400 mg Oral DAILY    therapeutic multivitamin (THERAGRAN) tablet 1 Tab  1 Tab Oral DAILY    fish oil-omega-3 fatty acids 340-1,000 mg capsule 1 Cap  1 Cap Oral DAILY    sodium chloride (NS) flush 5-10 mL  5-10 mL IntraVENous Q8H    sodium chloride (NS) flush 5-10 mL  5-10 mL IntraVENous PRN    acetaminophen (TYLENOL) tablet 650 mg  650 mg Oral Q4H PRN    ondansetron (ZOFRAN) injection 4 mg  4 mg IntraVENous Q4H PRN    levothyroxine (SYNTHROID) tablet 88 mcg  88 mcg Oral ACB     Pt critically ill, poor prognosis, multiple severe issues. Aneesh Angela.  MARIETTA [place]     [facility] [address] [address]   [place] ramirez   [phone]

## 2018-04-25 NOTE — PROGRESS NOTES
[facility]  Pulmonary, Critical Care, and Sleep Medicine  Name: Prakash Rothman MRN: [id number]   : 1935 [facility] [place] [address]   Date: 2018          Impression Plan   · Metastatic breast cancer- new dx- left breast mass, diffuse skeletal mets. · Sepsis- WBC climbing- being treated for pansens Ecoli UTI with zosyn but appearing more septic today- ? Upper pole infection? Concomitant ESBL  · AMS/depressed LOC- suspect metabolic encephalopathy ( sepsis) but CVA possible  · Acute resp failure- CXR with pulm edema pattern- and likely basilar HCAP- LMS mucous plugging likely. Left lung ATX on CXR today   · JEROD  · NSTEMI- ECHO EF 40% 1. Plan for therapeutic bronch  2. Continue broad abx  3. Diuresis  4. pulm toilet  5. Keep in ICU  6. D/w pts son at length and I answered all of his questions to the best of my abilities and to his apparent satisfaction. Pt is acutely ill . Pt is clinically unstable .  At risk for decline due to sepsis/resp failure    Medical Decision Making Today  · I have reviewed the flowsheet and previous days notes  · One or more chronic illnesses with severe exacerbation, progression or side effects of treatment  · Acute or chronic illness that poses a threat to life or bodily function  · Abrupt change in neurologic status  · Review and order of Clinical lab tests  · Review and Order of Radiology tests  · Review and Order of Medicine tests  · Discuss case with Specialist MD  · Independent visualization of Image  · Review and summarize records or history fromprevious days notes  · Review and summarize records or history from outside facility  · I have personally reviewed the patients ECG / Tele       Radiology  ( personally reviewed) Seena Romberg left lung ATX   ABG Recent Labs      18   0505  18   1227  18   0331   PHI  7.360  7.394  7.365   PO2I  81  87  94   PCO2I  45.5*  41.0  46.3*          Subjective/Interval History:   I have reviewed the flowsheet and previous days notes. Review of systems not obtained due to patient factors. Alert denies pain no WOB      Objective:     Mode Rate Tidal Volume Pressure FiO2 PEEP            30 %       Vital Signs:     TMAX(24)      Intake/Output:   Last shift:      Ventilator Volumes  Vt Spont (ml): 384 ml  Ve Observed (l/min): 9.7 l/min  Last 3 shifts: 04/25 0701 - 04/25 1900  In: 69.5 [I.V.:69.5]  Out: - RRIOLAST3  Intake/Output Summary (Last 24 hours) at 04/25/18 0916  Last data filed at 04/25/18 0800   Gross per 24 hour   Intake           516.98 ml   Output             2290 ml   Net         -1773.02 ml     EXAM:   GENERAL: well developed and in mild distress, HEENT:  PERRL, EOMI, no alar flaring or epistaxis, oral mucosa moist without cyanosis, NECK:  no jugular vein distention, no retractions, no thyromegaly or masses, LUNGS: decreased breath sounds billaterally and with rhonchi , HEART:  Regular rate and rhythm with no MGR; no edema is present, ABDOMEN:  soft with no tenderness, bowel sounds present, EXTREMITIES:  warm with no cyanosis and SKIN:  no jaundice or ecchymosis        Data    I have personally reviewed data, flowsheets for the last 24 hours.         Labs:  Recent Labs      04/25/18 0453 04/24/18 0319  04/23/18   0432   WBC  20.2*  20.8*  15.5*   HGB  7.8*  8.2*  7.4*   HCT  24.9*  26.2*  23.0*   PLT  409*  422*  331     Recent Labs      04/25/18   0453  04/24/18 0319  04/23/18   1500  04/23/18   0432   NA  142  138  140   --   141   K  2.9*  3.1*  3.1*  3.1*  2.5*   CL  105  103  104   --   104   CO2  25 23 25   --   26   GLU  231*  188*  192*   --   107*   BUN  48*  50*  53*   --   48*   CREA  1.86*  1.86*  1.79*   --   1.74*   CA  10.0  10.2*  10.2*   --   9.9   MG  1.9  1.8   --   1.8   PHOS   --   4.0   --   3.7   ALB   --   2.9*   --    --    SGOT   --   39*   --    --    ALT   --   27   --    --        Shima Villareal MD  [facility]

## 2018-04-25 NOTE — PALLIATIVE CARE
Palliative Medicine Social Work      Checked in on patient and son, Cordell Engle. Patient with acute on chronic hypoxic/hypercarbic respiratory failure due to pulmonary edema this morning. She had bronchoscopy which showed termination of the left  mainstem bronchus suggests endobronchial mucous plugging. She was resting on BiPAP. Son doing okay. Goals still clear for now to continue with full support for hope of recovery. No CPR, shock, but okay with intubation if needed. Will continue to follow, support and clarify goals as we know more about her ability to recover. Thank you for the opportunity to be involved in the care of Ms. Ledesma. Mariaelena Babcock, ROXW, [facility]-  Palliative Medicine   Respecting Choices ® ACP Facilitator   [phone]

## 2018-04-25 NOTE — CONSULTS
Palliative Medicine Consult  Williams: [phone] ()    Patient Name: Germaine Staley  YOB: 1935    Date of Initial Consult: 18  Reason for Consult: care decisions  Requesting Provider: Dr. Gentry Fernandez  Primary Care Physician: Briseida Self MD     SUMMARY:   Germaine Staley is a 82 y.o. with a past history of rheumatoid arthritis, CKD stage 3, DM w neuropathy, hx colon cancer 26 years ago,  who was admitted on 2018 from home  with a diagnosis of shortness of breath, pulmonary edema. Current medical issues leading to Palliative Medicine involvement include: new diagnosis of metastatic breast cancer (biopsy pending),  + bone mets, possible liver mets, now in ICU with recurrent respiratory failure / on BIPAP. NSTEMI at admission (peak troponin 18.9), anemia (transfused  one unit), UTI (e Coli), acute on chronic renal failure (cr 1.8)    Patient and her  moved to [facility] in  from [facility]. Patient's  of over 48 years  in 2017 of pancreatic cancer.  (w hospice services). Patient lives alone, but has paid caregivers. She has 3 adult sons-- son Rubi Luite (oldest son), is [facility] [phone]       PALLIATIVE DIAGNOSES:   1. Acute respiratory failure, possible pneumonia  S/p Bronch  for mucus plugging  2. Constipation, fecal impaction  3. CHF, NSTEMI  4. Anemia  5. CKD   6. Delirium, multifactorial, medications likely contributing       PLAN:   1. Palliative Medicine services introduced to patient's son, Savanah Rodriguez, yesterday . See also Jaycee Fisher note. 2. Reviewed events of past 24hrs with son at bedside. 3. Patient currently going on BIPAP for increased work of breathing. 4. Goals are clear at this time to continue interventions (including temporary intubation if needed) , awaiting pathology. No CPR/resuscitation   5. Initial consult note routed to primary continuity provider  6.  Communicated plan of care with: Palliative IDT       GOALS OF CARE / TREATMENT PREFERENCES:     GOALS OF CARE:  Patient/Health Care Proxy Stated Goals: Other (comment) (waiting on pathology, for now continue aggressive measures, but no CPR /shock if she dies. temporary intubation OK)  Awaiting pathology, and palliative treatment options    TREATMENT PREFERENCES:   Code Status: Partial Code    Advance Care Planning:  Advance Care Planning 4/24/2018   Patient's Healthcare Decision Maker is: Verbal statement (Legal Next of Kin remains as decision maker)   Primary Decision Maker Name Ricky Banerjee   Primary Decision Maker Phone Number [phone]   Primary Decision Maker Relationship to Patient Adult child   Confirm Advance Directive Yes, not on file       Medical Interventions: Limited additional interventions   Other Instructions: Other:    As far as possible, the palliative care team has discussed with patient / health care proxy about goals of care / treatment preferences for patient. HISTORY:     History obtained from: chart, son    CHIEF COMPLAINT: admitted with shortness of breath    HPI/SUBJECTIVE:    The patient is:   [] Verbal and participatory  [x] Non-participatory due to:     82year old female, found to have possible metastatic disease in base of skull by Head CT in March 2018, who was admitted with report of worsening SOB.      Clinical Pain Assessment (nonverbal scale for severity on nonverbal patients):   Clinical Pain Assessment  Severity: 0     Activity (Movement): Lying quietly, normal position    Duration: for how long has pt been experiencing pain (e.g., 2 days, 1 month, years)  Frequency: how often pain is an issue (e.g., several times per day, once every few days, constant)     FUNCTIONAL ASSESSMENT:     Palliative Performance Scale (PPS):  PPS: 20       PSYCHOSOCIAL/SPIRITUAL SCREENING:     Palliative IDT has assessed this patient for cultural preferences / practices and a referral made as appropriate to needs (Cultural Services, Patient Advocacy, Ethics, etc.)    Advance Care Planning:  Advance Care Planning 4/24/2018   Patient's Healthcare Decision Maker is: Verbal statement (Legal Next of Kin remains as decision maker)   Primary Decision Maker Name Creaysha Loges   Primary Decision Maker Phone Number [phone]   Primary Decision Maker Relationship to Patient Adult child   Confirm Advance Directive Yes, not on file       Any spiritual / [employer] concerns:  [] Yes /  [x] No    Caregiver Burnout:  [] Yes /  [x] No /  [] No Caregiver Present      Anticipatory grief assessment:   [x] Normal  / [] Maladaptive       ESAS Anxiety:      ESAS Depression:          REVIEW OF SYSTEMS:     Positive and pertinent negative findings in ROS are noted above in HPI. The following systems were [x] reviewed / [] unable to be reviewed as noted in HPI  Other findings are noted below. Patient has delirium so is unable to report her symptoms  Systems: constitutional, ears/nose/mouth/throat, respiratory, gastrointestinal, genitourinary, musculoskeletal, integumentary, neurologic, psychiatric, endocrine. Positive findings noted below. Modified ESAS Completed by: provider   Fatigue: 10       Pain: 0           Dyspnea: 5     Constipation: No     Stool Occurrence(s): 1        PHYSICAL EXAM:     From RN flowsheet:  Wt Readings from Last 3 Encounters:   04/25/18 60.4 kg (133 lb 2.5 oz)   04/24/18 59.9 kg (132 lb)   04/20/18 64 kg (141 lb 1.5 oz)     Blood pressure 162/67, pulse 88, temperature 96.7 °F (35.9 °C), resp. rate 20, height 5' 1\" (1.549 m), weight 60.4 kg (133 lb 2.5 oz), SpO2 100 %.     Pain Scale 1: Numeric (0 - 10)  Pain Intensity 1: 0  Pain Onset 1: acute  Pain Location 1: Generalized     Pain Description 1: Aching  Pain Intervention(s) 1: Medication (see MAR)  Last bowel movement, if known:     Constitutional: pt resting in ICU on BIPAP NAD       HISTORY:     Principal Problem:    SOB (shortness of breath) (4/16/2018)    Active Problems:    Altered mental status, unspecified (2018)      Acute systolic heart failure ([facility] (2018)      Past Medical History:   Diagnosis Date    Anemia 2009    Colon cancer ([facility] 2009    surgery/chemo    Colon cancer ([facility] 2009    DM (diabetes mellitus) ([facility] 2009    GERD (gastroesophageal reflux disease)     H/O: CVA 2009    slight l sided weakness    Hypothyroid 2009    Ill-defined condition     seasonal allergies    Microalbuminuria 2009    Osteoporosis 2009    Other and unspecified hyperlipidemia 2010    Rheumatoid arthritis involving ankle ([facility] 2016    Rheumatoid arthritis(714.0) 2009    Unspecified essential hypertension 2009    Weakness due to cerebrovascular accident       Past Surgical History:   Procedure Laterality Date    HX COLECTOMY      HX HYSTERECTOMY      HX OTHER SURGICAL      colonoscopies numerous since       Family History   Problem Relation Age of Onset    Diabetes Mother     Stroke Mother     Diabetes Sister     Other Sister      fell and hit her head -  of this   Alondra Chuy Diabetes Brother     Cancer Father      stomach    Diabetes Sister       History reviewed, no pertinent family history.   Social History   Substance Use Topics    Smoking status: Never Smoker    Smokeless tobacco: Never Used    Alcohol use No     Allergies   Allergen Reactions    Statins-Hmg-Coa Reductase Inhibitors Other (comments)     Intolerant to statins     Sulfa (Sulfonamide Antibiotics) Other (comments)     syncope      Current Facility-Administered Medications   Medication Dose Route Frequency    dexmedeTOMidine (PRECEDEX) 400 mcg in 0.9% sodium chloride 100 mL infusion  0.2-0.7 mcg/kg/hr IntraVENous TITRATE    sodium chloride (NS) flush 5-10 mL  5-10 mL IntraVENous Q8H    sodium chloride (NS) flush 5-10 mL  5-10 mL IntraVENous PRN    lidocaine (XYLOCAINE) 20 mg/mL (2 %) injection 6 mg  0.3 mL Other ONCE    albuterol-ipratropium (DUO-NEB) 2.5 MG-0.5 MG/3 ML  3 mL Nebulization Q4H RT    budesonide (PULMICORT) 500 mcg/2 ml nebulizer suspension  500 mcg Nebulization BID RT    acetylcysteine (MUCOMYST) 200 mg/mL (20 %) solution 200 mg  200 mg Nebulization QID RT    hydrALAZINE (APRESOLINE) 20 mg/mL injection 10 mg  10 mg IntraVENous Q6H PRN    [START ON 4/27/2018] fluconazole in 0.9% NaCl 100 mg IVPB  100 mg IntraVENous Q24H    0.9% sodium chloride infusion 250 mL  250 mL IntraVENous PRN    acetaminophen (TYLENOL) tablet 650 mg  650 mg Oral Q4H PRN    potassium chloride in water 10 mEq/50 mL IVPB        carvedilol (COREG) tablet 6.25 mg  6.25 mg Oral BID    sodium chloride (NS) flush 20 mL  20 mL InterCATHeter PRN    sodium chloride (NS) flush 10 mL  10 mL InterCATHeter Q24H    sodium chloride (NS) flush 10 mL  10 mL InterCATHeter PRN    sodium chloride (NS) flush 10 mL  10 mL InterCATHeter Q8H    alteplase (CATHFLO) 1 mg in sterile water (preservative free) 1 mL injection  1 mg InterCATHeter PRN    bacitracin 500 unit/gram packet 1 Packet  1 Packet Topical PRN    meropenem (MERREM) 500 mg in 0.9% sodium chloride (MBP/ADV) 50 mL  0.5 g IntraVENous Q12H    levoFLOXacin (LEVAQUIN) 500 mg in D5W IVPB  500 mg IntraVENous Q48H    bumetanide (BUMEX) injection 2 mg  2 mg IntraVENous Q12H    sodium chloride (NS) flush 10-30 mL  10-30 mL InterCATHeter PRN    sodium chloride (NS) flush 10 mL  10 mL InterCATHeter Q24H    sodium chloride (NS) flush 10 mL  10 mL InterCATHeter PRN    sodium chloride (NS) flush 10-40 mL  10-40 mL InterCATHeter Q8H    sodium chloride (NS) flush 20 mL  20 mL InterCATHeter Q24H    alteplase (CATHFLO) 1 mg in sterile water (preservative free) 1 mL injection  1 mg InterCATHeter PRN    anastrozole (ARIMIDEX) tablet 1 mg  1 mg Oral DAILY    0.9% sodium chloride infusion 250 mL  250 mL IntraVENous PRN    epoetin celio (EPOGEN;PROCRIT) injection 10,000 Units  10,000 Units SubCUTAneous Q MON, WED & FRI    albuterol-ipratropium (DUO-NEB) 2.5 MG-0.5 MG/3 ML  3 mL Nebulization Q6H PRN    glucose chewable tablet 16 g  4 Tab Oral PRN    dextrose (D50W) injection syrg 12.5-25 g  12.5-25 g IntraVENous PRN    glucagon (GLUCAGEN) injection 1 mg  1 mg IntraMUSCular PRN    insulin lispro (HUMALOG) injection   SubCUTAneous AC&HS    prochlorperazine (COMPAZINE) with saline injection 5 mg  5 mg IntraVENous Q8H PRN    ascorbic acid (vitamin C) (VITAMIN C) tablet 500 mg  500 mg Oral DAILY    cholecalciferol (VITAMIN D3) tablet 1,000 Units  1,000 Units Oral DAILY    folic acid (FOLVITE) tablet 1 mg  1 mg Oral DAILY    magnesium oxide (MAG-OX) tablet 400 mg  400 mg Oral DAILY    therapeutic multivitamin (THERAGRAN) tablet 1 Tab  1 Tab Oral DAILY    fish oil-omega-3 fatty acids 340-1,000 mg capsule 1 Cap  1 Cap Oral DAILY    sodium chloride (NS) flush 5-10 mL  5-10 mL IntraVENous Q8H    sodium chloride (NS) flush 5-10 mL  5-10 mL IntraVENous PRN    ondansetron (ZOFRAN) injection 4 mg  4 mg IntraVENous Q4H PRN    levothyroxine (SYNTHROID) tablet 88 mcg  88 mcg Oral ACB          LAB AND IMAGING FINDINGS:     Lab Results   Component Value Date/Time    WBC 15.5 (H) 04/26/2018 03:48 AM    HGB 6.7 (L) 04/26/2018 03:48 AM    PLATELET 456 71/93/1744 03:48 AM     Lab Results   Component Value Date/Time    Sodium 144 04/26/2018 03:48 AM    Potassium 3.3 (L) 04/26/2018 03:48 AM    Chloride 108 04/26/2018 03:48 AM    CO2 27 04/26/2018 03:48 AM    BUN 63 (H) 04/26/2018 03:48 AM    Creatinine 1.83 (H) 04/26/2018 03:48 AM    Calcium 9.3 04/26/2018 03:48 AM    Magnesium 1.5 (L) 04/26/2018 03:48 AM    Phosphorus 3.5 04/26/2018 03:48 AM      Lab Results   Component Value Date/Time    AST (SGOT) 39 (H) 04/24/2018 03:19 AM    Alk.  phosphatase 304 (H) 04/24/2018 03:19 AM    Protein, total 7.8 04/24/2018 03:19 AM    Albumin 2.9 (L) 04/24/2018 03:19 AM    Globulin 4.9 (H) 04/24/2018 03:19 AM     Lab Results   Component Value Date/Time    aPTT 32.1 (H) 04/22/2018 06:39 AM      Lab Results   Component Value Date/Time    Iron 12 (L) 04/19/2018 06:09 PM    TIBC 232 (L) 04/19/2018 06:09 PM    Iron % saturation 5 (L) 04/19/2018 06:09 PM    Ferritin 424 (H) 04/19/2018 06:09 PM      No results found for: PH, PCO2, PO2  No components found for: [place]   Lab Results   Component Value Date/Time    CK 41 04/22/2018 07:35 AM    CK - MB 1.2 04/22/2018 07:35 AM                Total time:  Counseling / coordination time, spent as noted above:   > 50% counseling / coordination?:     Prolonged service was provided for  []30 min   []75 min in face to face time in the presence of the patient, spent as noted above. Time Start:   Time End:   Note: this can only be billed with 55184 (initial) or 51321 (follow up). If multiple start / stop times, list each separately.

## 2018-04-26 ENCOUNTER — APPOINTMENT (OUTPATIENT)
Dept: MRI IMAGING | Age: 83
DRG: 853 | End: 2018-04-26
Attending: HOSPITALIST
Payer: MEDICARE

## 2018-04-26 ENCOUNTER — APPOINTMENT (OUTPATIENT)
Dept: GENERAL RADIOLOGY | Age: 83
DRG: 853 | End: 2018-04-26
Attending: INTERNAL MEDICINE
Payer: MEDICARE

## 2018-04-26 LAB
ANION GAP SERPL CALC-SCNC: 9 MMOL/L (ref 5–15)
BASOPHILS # BLD: 0 K/UL (ref 0–0.1)
BASOPHILS NFR BLD: 0 % (ref 0–1)
BUN SERPL-MCNC: 63 MG/DL (ref 6–20)
BUN/CREAT SERPL: 34 (ref 12–20)
CALCIUM SERPL-MCNC: 9.3 MG/DL (ref 8.5–10.1)
CHLORIDE SERPL-SCNC: 108 MMOL/L (ref 97–108)
CO2 SERPL-SCNC: 27 MMOL/L (ref 21–32)
CREAT SERPL-MCNC: 1.83 MG/DL (ref 0.55–1.02)
DIFFERENTIAL METHOD BLD: ABNORMAL
EOSINOPHIL # BLD: 0 K/UL (ref 0–0.4)
EOSINOPHIL NFR BLD: 0 % (ref 0–7)
ERYTHROCYTE [DISTWIDTH] IN BLOOD BY AUTOMATED COUNT: 24.2 % (ref 11.5–14.5)
GLUCOSE BLD STRIP.AUTO-MCNC: 120 MG/DL (ref 65–100)
GLUCOSE BLD STRIP.AUTO-MCNC: 123 MG/DL (ref 65–100)
GLUCOSE BLD STRIP.AUTO-MCNC: 141 MG/DL (ref 65–100)
GLUCOSE BLD STRIP.AUTO-MCNC: 148 MG/DL (ref 65–100)
GLUCOSE SERPL-MCNC: 112 MG/DL (ref 65–100)
HCT VFR BLD AUTO: 21.4 % (ref 35–47)
HGB BLD-MCNC: 6.7 G/DL (ref 11.5–16)
IMM GRANULOCYTES # BLD: 0.3 K/UL (ref 0–0.04)
IMM GRANULOCYTES NFR BLD AUTO: 2 % (ref 0–0.5)
LYMPHOCYTES # BLD: 1.6 K/UL (ref 0.8–3.5)
LYMPHOCYTES NFR BLD: 10 % (ref 12–49)
MAGNESIUM SERPL-MCNC: 1.5 MG/DL (ref 1.6–2.4)
MCH RBC QN AUTO: 28.6 PG (ref 26–34)
MCHC RBC AUTO-ENTMCNC: 31.3 G/DL (ref 30–36.5)
MCV RBC AUTO: 91.5 FL (ref 80–99)
MONOCYTES # BLD: 1.9 K/UL (ref 0–1)
MONOCYTES NFR BLD: 12 % (ref 5–13)
NEUTS SEG # BLD: 11.7 K/UL (ref 1.8–8)
NEUTS SEG NFR BLD: 76 % (ref 32–75)
NRBC # BLD: 0.24 K/UL (ref 0–0.01)
NRBC BLD-RTO: 1.5 PER 100 WBC
PHOSPHATE SERPL-MCNC: 3.5 MG/DL (ref 2.6–4.7)
PLATELET # BLD AUTO: 333 K/UL (ref 150–400)
PLATELET COMMENTS,PCOM: ABNORMAL
PMV BLD AUTO: 9.8 FL (ref 8.9–12.9)
POTASSIUM SERPL-SCNC: 3.3 MMOL/L (ref 3.5–5.1)
RBC # BLD AUTO: 2.34 M/UL (ref 3.8–5.2)
RBC MORPH BLD: ABNORMAL
SERVICE CMNT-IMP: ABNORMAL
SODIUM SERPL-SCNC: 144 MMOL/L (ref 136–145)
WBC # BLD AUTO: 15.5 K/UL (ref 3.6–11)

## 2018-04-26 PROCEDURE — 30233N1 TRANSFUSION OF NONAUTOLOGOUS RED BLOOD CELLS INTO PERIPHERAL VEIN, PERCUTANEOUS APPROACH: ICD-10-PCS | Performed by: INTERNAL MEDICINE

## 2018-04-26 PROCEDURE — 65610000006 HC RM INTENSIVE CARE

## 2018-04-26 PROCEDURE — 36415 COLL VENOUS BLD VENIPUNCTURE: CPT | Performed by: INTERNAL MEDICINE

## 2018-04-26 PROCEDURE — 74011000250 HC RX REV CODE- 250: Performed by: INTERNAL MEDICINE

## 2018-04-26 PROCEDURE — 74011250636 HC RX REV CODE- 250/636: Performed by: INTERNAL MEDICINE

## 2018-04-26 PROCEDURE — 71045 X-RAY EXAM CHEST 1 VIEW: CPT

## 2018-04-26 PROCEDURE — 74011250637 HC RX REV CODE- 250/637: Performed by: INTERNAL MEDICINE

## 2018-04-26 PROCEDURE — P9016 RBC LEUKOCYTES REDUCED: HCPCS | Performed by: INTERNAL MEDICINE

## 2018-04-26 PROCEDURE — 76010000379 HC BRONCHOSCOPY DIAG/THERAPEUTIC

## 2018-04-26 PROCEDURE — 77010033678 HC OXYGEN DAILY

## 2018-04-26 PROCEDURE — 82962 GLUCOSE BLOOD TEST: CPT

## 2018-04-26 PROCEDURE — 87070 CULTURE OTHR SPECIMN AEROBIC: CPT | Performed by: INTERNAL MEDICINE

## 2018-04-26 PROCEDURE — 74011636637 HC RX REV CODE- 636/637: Performed by: HOSPITALIST

## 2018-04-26 PROCEDURE — 83735 ASSAY OF MAGNESIUM: CPT | Performed by: INTERNAL MEDICINE

## 2018-04-26 PROCEDURE — 70551 MRI BRAIN STEM W/O DYE: CPT

## 2018-04-26 PROCEDURE — 74011000258 HC RX REV CODE- 258: Performed by: INTERNAL MEDICINE

## 2018-04-26 PROCEDURE — 36430 TRANSFUSION BLD/BLD COMPNT: CPT

## 2018-04-26 PROCEDURE — 80048 BASIC METABOLIC PNL TOTAL CA: CPT | Performed by: INTERNAL MEDICINE

## 2018-04-26 PROCEDURE — 94640 AIRWAY INHALATION TREATMENT: CPT

## 2018-04-26 PROCEDURE — 77030018836 HC SOL IRR NACL ICUM -A

## 2018-04-26 PROCEDURE — 85025 COMPLETE CBC W/AUTO DIFF WBC: CPT | Performed by: INTERNAL MEDICINE

## 2018-04-26 PROCEDURE — 0B9J8ZZ DRAINAGE OF LEFT LOWER LUNG LOBE, VIA NATURAL OR ARTIFICIAL OPENING ENDOSCOPIC: ICD-10-PCS | Performed by: INTERNAL MEDICINE

## 2018-04-26 PROCEDURE — 74011250636 HC RX REV CODE- 250/636

## 2018-04-26 PROCEDURE — 74011000250 HC RX REV CODE- 250

## 2018-04-26 PROCEDURE — 74011250637 HC RX REV CODE- 250/637: Performed by: NURSE PRACTITIONER

## 2018-04-26 PROCEDURE — 84100 ASSAY OF PHOSPHORUS: CPT | Performed by: INTERNAL MEDICINE

## 2018-04-26 PROCEDURE — 74011250636 HC RX REV CODE- 250/636: Performed by: NURSE PRACTITIONER

## 2018-04-26 PROCEDURE — 93306 TTE W/DOPPLER COMPLETE: CPT

## 2018-04-26 PROCEDURE — 94660 CPAP INITIATION&MGMT: CPT

## 2018-04-26 PROCEDURE — 77030012699 HC VLV SUC CNTRL OCOA -A

## 2018-04-26 RX ORDER — LIDOCAINE HYDROCHLORIDE 20 MG/ML
1.5 INJECTION, SOLUTION EPIDURAL; INFILTRATION; INTRACAUDAL; PERINEURAL ONCE
Status: COMPLETED | OUTPATIENT
Start: 2018-04-26 | End: 2018-04-26

## 2018-04-26 RX ORDER — POTASSIUM CHLORIDE 14.9 MG/ML
10 INJECTION INTRAVENOUS
Status: COMPLETED | OUTPATIENT
Start: 2018-04-26 | End: 2018-04-28

## 2018-04-26 RX ORDER — BUDESONIDE 0.5 MG/2ML
500 INHALANT ORAL
Status: DISCONTINUED | OUTPATIENT
Start: 2018-04-26 | End: 2018-05-11 | Stop reason: HOSPADM

## 2018-04-26 RX ORDER — HYDRALAZINE HYDROCHLORIDE 20 MG/ML
10 INJECTION INTRAMUSCULAR; INTRAVENOUS
Status: DISCONTINUED | OUTPATIENT
Start: 2018-04-26 | End: 2018-05-11 | Stop reason: HOSPADM

## 2018-04-26 RX ORDER — FLUCONAZOLE 2 MG/ML
200 INJECTION, SOLUTION INTRAVENOUS ONCE
Status: COMPLETED | OUTPATIENT
Start: 2018-04-26 | End: 2018-04-28

## 2018-04-26 RX ORDER — MAGNESIUM SULFATE HEPTAHYDRATE 40 MG/ML
2 INJECTION, SOLUTION INTRAVENOUS ONCE
Status: COMPLETED | OUTPATIENT
Start: 2018-04-26 | End: 2018-04-26

## 2018-04-26 RX ORDER — POTASSIUM CHLORIDE 14.9 MG/ML
INJECTION INTRAVENOUS
Status: DISPENSED
Start: 2018-04-26 | End: 2018-04-27

## 2018-04-26 RX ORDER — IPRATROPIUM BROMIDE AND ALBUTEROL SULFATE 2.5; .5 MG/3ML; MG/3ML
3 SOLUTION RESPIRATORY (INHALATION)
Status: DISCONTINUED | OUTPATIENT
Start: 2018-04-26 | End: 2018-05-03

## 2018-04-26 RX ORDER — IPRATROPIUM BROMIDE AND ALBUTEROL SULFATE 2.5; .5 MG/3ML; MG/3ML
SOLUTION RESPIRATORY (INHALATION)
Status: COMPLETED
Start: 2018-04-26 | End: 2018-04-26

## 2018-04-26 RX ORDER — LIDOCAINE HYDROCHLORIDE 20 MG/ML
0.3 INJECTION, SOLUTION INFILTRATION; PERINEURAL ONCE
Status: ACTIVE | OUTPATIENT
Start: 2018-04-26 | End: 2018-04-26

## 2018-04-26 RX ORDER — SODIUM CHLORIDE 0.9 % (FLUSH) 0.9 %
5-10 SYRINGE (ML) INJECTION AS NEEDED
Status: DISCONTINUED | OUTPATIENT
Start: 2018-04-26 | End: 2018-05-11 | Stop reason: HOSPADM

## 2018-04-26 RX ORDER — SODIUM CHLORIDE 9 MG/ML
250 INJECTION, SOLUTION INTRAVENOUS AS NEEDED
Status: DISCONTINUED | OUTPATIENT
Start: 2018-04-26 | End: 2018-05-11 | Stop reason: HOSPADM

## 2018-04-26 RX ORDER — FUROSEMIDE 10 MG/ML
40 INJECTION INTRAMUSCULAR; INTRAVENOUS ONCE
Status: COMPLETED | OUTPATIENT
Start: 2018-04-26 | End: 2018-04-26

## 2018-04-26 RX ORDER — ACETYLCYSTEINE 200 MG/ML
200 SOLUTION ORAL; RESPIRATORY (INHALATION)
Status: DISCONTINUED | OUTPATIENT
Start: 2018-04-26 | End: 2018-05-03

## 2018-04-26 RX ORDER — NYSTATIN 100000 [USP'U]/ML
500000 SUSPENSION ORAL 4 TIMES DAILY
Status: DISCONTINUED | OUTPATIENT
Start: 2018-04-26 | End: 2018-04-26

## 2018-04-26 RX ORDER — SODIUM CHLORIDE 0.9 % (FLUSH) 0.9 %
5-10 SYRINGE (ML) INJECTION EVERY 8 HOURS
Status: DISCONTINUED | OUTPATIENT
Start: 2018-04-26 | End: 2018-05-11 | Stop reason: HOSPADM

## 2018-04-26 RX ORDER — ACETAMINOPHEN 325 MG/1
650 TABLET ORAL
Status: DISCONTINUED | OUTPATIENT
Start: 2018-04-26 | End: 2018-04-28

## 2018-04-26 RX ADMIN — Medication 10 ML: at 06:52

## 2018-04-26 RX ADMIN — ACETYLCYSTEINE 200 MG: 200 SOLUTION ORAL; RESPIRATORY (INHALATION) at 12:37

## 2018-04-26 RX ADMIN — Medication 10 ML: at 12:16

## 2018-04-26 RX ADMIN — FLUCONAZOLE 200 MG: 2 INJECTION INTRAVENOUS at 12:46

## 2018-04-26 RX ADMIN — MEROPENEM 500 MG: 500 INJECTION, POWDER, FOR SOLUTION INTRAVENOUS at 00:33

## 2018-04-26 RX ADMIN — INSULIN LISPRO 2 UNITS: 100 INJECTION, SOLUTION INTRAVENOUS; SUBCUTANEOUS at 02:00

## 2018-04-26 RX ADMIN — BUMETANIDE 2 MG: 0.25 INJECTION INTRAMUSCULAR; INTRAVENOUS at 09:46

## 2018-04-26 RX ADMIN — IPRATROPIUM BROMIDE AND ALBUTEROL SULFATE 3 ML: .5; 3 SOLUTION RESPIRATORY (INHALATION) at 09:34

## 2018-04-26 RX ADMIN — Medication 10 ML: at 18:00

## 2018-04-26 RX ADMIN — Medication 10 ML: at 14:12

## 2018-04-26 RX ADMIN — Medication 10 ML: at 06:51

## 2018-04-26 RX ADMIN — Medication 10 ML: at 21:29

## 2018-04-26 RX ADMIN — IPRATROPIUM BROMIDE AND ALBUTEROL SULFATE 3 ML: .5; 3 SOLUTION RESPIRATORY (INHALATION) at 17:26

## 2018-04-26 RX ADMIN — MAGNESIUM SULFATE HEPTAHYDRATE 2 G: 40 INJECTION, SOLUTION INTRAVENOUS at 10:49

## 2018-04-26 RX ADMIN — ACETYLCYSTEINE 200 MG: 200 SOLUTION ORAL; RESPIRATORY (INHALATION) at 20:04

## 2018-04-26 RX ADMIN — BUDESONIDE 500 MCG: 0.5 INHALANT RESPIRATORY (INHALATION) at 12:38

## 2018-04-26 RX ADMIN — POTASSIUM CHLORIDE 10 MEQ: 200 INJECTION, SOLUTION INTRAVENOUS at 12:46

## 2018-04-26 RX ADMIN — FUROSEMIDE 40 MG: 10 INJECTION, SOLUTION INTRAMUSCULAR; INTRAVENOUS at 14:06

## 2018-04-26 RX ADMIN — IPRATROPIUM BROMIDE AND ALBUTEROL SULFATE 3 ML: .5; 3 SOLUTION RESPIRATORY (INHALATION) at 20:04

## 2018-04-26 RX ADMIN — Medication 10 ML: at 12:17

## 2018-04-26 RX ADMIN — BUMETANIDE 2 MG: 0.25 INJECTION INTRAMUSCULAR; INTRAVENOUS at 21:11

## 2018-04-26 RX ADMIN — Medication 10 ML: at 21:37

## 2018-04-26 RX ADMIN — IPRATROPIUM BROMIDE AND ALBUTEROL SULFATE 3 ML: .5; 3 SOLUTION RESPIRATORY (INHALATION) at 12:37

## 2018-04-26 RX ADMIN — POTASSIUM CHLORIDE 10 MEQ: 200 INJECTION, SOLUTION INTRAVENOUS at 09:29

## 2018-04-26 RX ADMIN — Medication 10 ML: at 21:30

## 2018-04-26 RX ADMIN — POTASSIUM CHLORIDE 10 MEQ: 200 INJECTION, SOLUTION INTRAVENOUS at 10:49

## 2018-04-26 RX ADMIN — INSULIN LISPRO 2 UNITS: 100 INJECTION, SOLUTION INTRAVENOUS; SUBCUTANEOUS at 12:53

## 2018-04-26 RX ADMIN — IPRATROPIUM BROMIDE AND ALBUTEROL SULFATE 3 ML: .5; 3 SOLUTION RESPIRATORY (INHALATION) at 23:51

## 2018-04-26 RX ADMIN — BUDESONIDE 500 MCG: 0.5 INHALANT RESPIRATORY (INHALATION) at 20:04

## 2018-04-26 RX ADMIN — ACETYLCYSTEINE 200 MG: 200 SOLUTION ORAL; RESPIRATORY (INHALATION) at 17:26

## 2018-04-26 RX ADMIN — LEVOFLOXACIN 500 MG: 5 INJECTION, SOLUTION INTRAVENOUS at 16:02

## 2018-04-26 RX ADMIN — HYDRALAZINE HYDROCHLORIDE 10 MG: 20 INJECTION INTRAMUSCULAR; INTRAVENOUS at 21:32

## 2018-04-26 RX ADMIN — MEROPENEM 500 MG: 500 INJECTION, POWDER, FOR SOLUTION INTRAVENOUS at 14:06

## 2018-04-26 RX ADMIN — SODIUM CHLORIDE 0.2 MCG/KG/HR: 900 INJECTION, SOLUTION INTRAVENOUS at 08:59

## 2018-04-26 RX ADMIN — BENZOCAINE 1 SPRAY: 200 SPRAY DENTAL; ORAL; PERIODONTAL at 10:00

## 2018-04-26 RX ADMIN — CARVEDILOL 6.25 MG: 6.25 TABLET, FILM COATED ORAL at 17:30

## 2018-04-26 RX ADMIN — LIDOCAINE HYDROCHLORIDE 8 ML: 20 INJECTION, SOLUTION EPIDURAL; INFILTRATION; INTRACAUDAL; PERINEURAL at 10:00

## 2018-04-26 NOTE — PROGRESS NOTES
Scattered wheezing heard throughout upper and lower lung bases, 02 99 %, RR >30- Dr. Vasquez Park in room with patient, requests duo neb treatment stat. Patient still with audible wheezing.

## 2018-04-26 NOTE — PROGRESS NOTES
Problem: Falls - Risk of  Goal: *Absence of Falls  Document Yaron Fall Risk and appropriate interventions in the flowsheet. Outcome: Progressing Towards Goal  Fall Risk Interventions:  Mobility Interventions: Communicate number of staff needed for ambulation/transfer    Mentation Interventions: Adequate sleep, hydration, pain control, Door open when patient unattended, Evaluate medications/consider consulting pharmacy, More frequent rounding, Reorient patient    Medication Interventions: Evaluate medications/consider consulting pharmacy    Elimination Interventions:  Toileting schedule/hourly rounds    History of Falls Interventions: Room close to nurse's station, Evaluate medications/consider consulting pharmacy

## 2018-04-26 NOTE — PROGRESS NOTES
[facility]   [address] [facility], [facility] [address], [facility]  Phone: [phone]   [phone]       Nephrology Progress Note  Yusra Arnold     1935     [id number]  Date of Admission : 4/16/2018 04/26/18    CC: Follow up for JEROD        Assessment and Plan   JEROD on CKD   - Initially from Cardiac event/ NSTEMI + hypotension/sepsis  - labile BP + diuresis causing further injury  - Cr stable  - cont bumex BID for now  - cont castro  - daily labs    Hypokalemia/Hypomag:  - IV repletion ordered     Labile HTN:  - leading to flash pulm edema  - cont current meds and diuretics    Resp Failure:  - from pulm edema + mucus plug  - bronch 4/25 and 4/26    Sepsis  - ? Source: stercoral proctitis from rectal fecal impaction   - WBC improving  - for colonosocopy today     CKD Stage III :  - baseline Cr 0.9-1 mg/dl lately   - progressing, Cr around 1.5 to 1.7 here     NSTEMI :  - Echo shows EF 35-45%, mod LVH, Severe Hypokinesis of apex and moderate Hypokinesis of anteroseptal wall     Acute Systolic CHF: 2/2 MI   - avoid  ACE/ARB for now     Chronic Pontine Infarcts      ? Brain Mets on MRI and Liver met    hx of colon Ca     Anemia :  - per heme/onc     Interval History:  Seen and examined. Off labetalol drip. Still with labored breathing. On NC O2. CXR findings noted. Cr stable, UOP stable. Awake, mild SOB, no cp, n/v/d reported by pt. Review of Systems: Pertinent items are noted in HPI.     Current Medications:   Current Facility-Administered Medications   Medication Dose Route Frequency    potassium chloride 10 mEq in 50 ml IVPB  10 mEq IntraVENous Q1H    dexmedeTOMidine (PRECEDEX) 400 mcg in 0.9% sodium chloride 100 mL infusion  0.2-0.7 mcg/kg/hr IntraVENous TITRATE    magnesium sulfate 2 g/50 ml IVPB (premix or compounded)  2 g IntraVENous ONCE    sodium chloride (NS) flush 5-10 mL  5-10 mL IntraVENous Q8H    sodium chloride (NS) flush 5-10 mL  5-10 mL IntraVENous PRN    [COMPLETED] benzocaine (HURRICANE) 20 % spray   Mucous Membrane ONCE    lidocaine (PF) (XYLOCAINE) 20 mg/mL (2 %) injection 90.6 mg  1.5 mg/kg Other ONCE    lidocaine (XYLOCAINE) 20 mg/mL (2 %) injection 6 mg  0.3 mL Other ONCE    hydrALAZINE (APRESOLINE) 20 mg/mL injection 10 mg  10 mg IntraVENous Q6H PRN    labetalol (NORMODYNE;TRANDATE) 300 mg in 0.9% sodium chloride 150 mL (2 mg / 1 mL) infusion  0.5-2 mg/min IntraVENous TITRATE    carvedilol (COREG) tablet 6.25 mg  6.25 mg Oral BID    sodium chloride (NS) flush 20 mL  20 mL InterCATHeter PRN    sodium chloride (NS) flush 10 mL  10 mL InterCATHeter Q24H    sodium chloride (NS) flush 10 mL  10 mL InterCATHeter PRN    sodium chloride (NS) flush 10 mL  10 mL InterCATHeter Q8H    alteplase (CATHFLO) 1 mg in sterile water (preservative free) 1 mL injection  1 mg InterCATHeter PRN    bacitracin 500 unit/gram packet 1 Packet  1 Packet Topical PRN    meropenem (MERREM) 500 mg in 0.9% sodium chloride (MBP/ADV) 50 mL  0.5 g IntraVENous Q12H    levoFLOXacin (LEVAQUIN) 500 mg in D5W IVPB  500 mg IntraVENous Q48H    bumetanide (BUMEX) injection 2 mg  2 mg IntraVENous Q12H    sodium chloride (NS) flush 10-30 mL  10-30 mL InterCATHeter PRN    sodium chloride (NS) flush 10 mL  10 mL InterCATHeter Q24H    sodium chloride (NS) flush 10 mL  10 mL InterCATHeter PRN    sodium chloride (NS) flush 10-40 mL  10-40 mL InterCATHeter Q8H    sodium chloride (NS) flush 20 mL  20 mL InterCATHeter Q24H    alteplase (CATHFLO) 1 mg in sterile water (preservative free) 1 mL injection  1 mg InterCATHeter PRN    anastrozole (ARIMIDEX) tablet 1 mg  1 mg Oral DAILY    0.9% sodium chloride infusion 250 mL  250 mL IntraVENous PRN    acetaminophen (TYLENOL) tablet 650 mg  650 mg Oral Q4H PRN    epoetin celio (EPOGEN;PROCRIT) injection 10,000 Units  10,000 Units SubCUTAneous Q MON, WED & FRI    albuterol-ipratropium (DUO-NEB) 2.5 MG-0.5 MG/3 ML  3 mL Nebulization Q6H PRN    glucose chewable tablet 16 g  4 Tab Oral PRN    dextrose (D50W) injection syrg 12.5-25 g  12.5-25 g IntraVENous PRN    glucagon (GLUCAGEN) injection 1 mg  1 mg IntraMUSCular PRN    insulin lispro (HUMALOG) injection   SubCUTAneous AC&HS    prochlorperazine (COMPAZINE) with saline injection 5 mg  5 mg IntraVENous Q8H PRN    ascorbic acid (vitamin C) (VITAMIN C) tablet 500 mg  500 mg Oral DAILY    cholecalciferol (VITAMIN D3) tablet 1,000 Units  1,000 Units Oral DAILY    folic acid (FOLVITE) tablet 1 mg  1 mg Oral DAILY    magnesium oxide (MAG-OX) tablet 400 mg  400 mg Oral DAILY    therapeutic multivitamin (THERAGRAN) tablet 1 Tab  1 Tab Oral DAILY    fish oil-omega-3 fatty acids 340-1,000 mg capsule 1 Cap  1 Cap Oral DAILY    sodium chloride (NS) flush 5-10 mL  5-10 mL IntraVENous Q8H    sodium chloride (NS) flush 5-10 mL  5-10 mL IntraVENous PRN    acetaminophen (TYLENOL) tablet 650 mg  650 mg Oral Q4H PRN    ondansetron (ZOFRAN) injection 4 mg  4 mg IntraVENous Q4H PRN    levothyroxine (SYNTHROID) tablet 88 mcg  88 mcg Oral ACB      Allergies   Allergen Reactions    Statins-Hmg-Coa Reductase Inhibitors Other (comments)     Intolerant to statins     Sulfa (Sulfonamide Antibiotics) Other (comments)     syncope       Objective:  Vitals:    Vitals:    04/26/18 0900 04/26/18 0905 04/26/18 0910 04/26/18 0915   BP: (!) 155/96 (!) 158/96 157/69 154/62   Pulse: 97 91 98 100   Resp: 29 23 26 27   Temp:       SpO2: 100% 100% 100% 100%   Weight:       Height:         Intake and Output:     04/24 1901 - 04/26 0700  In: 435.9 [I.V.:435.9]  Out: 7443 [Urine:2789]    Physical Examination:    General: lethargic  Neck:  Supple, no mass  Resp:  Diminished at bases   CV:  RRR, no murmur  GI:  Soft, NT, + BS  Neurologic:  Non focal     []    High complexity decision making was performed  []    Patient is at high-risk of decompensation with multiple organ involvement    Lab Data Personally Reviewed: I have reviewed all the pertinent labs, microbiology data and radiology studies during assessment.     Recent Labs      04/26/18 0348 04/25/18   1842 04/25/18 0453 04/24/18 0319 04/23/18   1500   NA  144  144  142  138  140   --    K  3.3*  3.4*  2.9*  3.1*  3.1*  3.1*   CL  108  108  105  103  104   --    CO2  27  29  25  23  25   --    GLU  112*  123*  231*  188*  192*   --    BUN  63*  60*  48*  50*  53*   --    CREA  1.83*  1.83*  1.86*  1.86*  1.79*   --    CA  9.3  9.1  10.0  10.2*  10.2*   --    MG  1.5*   --   1.9  1.8   --    PHOS  3.5   --    --   4.0   --    ALB   --    --    --   2.9*   --    SGOT   --    --    --   39*   --    ALT   --    --    --   27   --      Recent Labs      04/26/18 0348 04/25/18 0453 04/24/18 0319   WBC  15.5*  20.2*  20.8*   HGB  6.7*  7.8*  8.2*   HCT  21.4*  24.9*  26.2*   PLT  333  409*  422*     No results found for: SDES  Lab Results   Component Value Date/Time    Culture result: PENDING 04/25/2018 09:40 AM    Culture result: NO GROWTH 1 DAY 04/22/2018 07:35 AM    Culture result: NO GROWTH 4 DAYS 04/22/2018 07:35 AM    Culture result: GRAM NEGATIVE RODS (A) 04/20/2018 04:30 AM    Culture result:  04/20/2018 04:30 AM      PLEASE REFER TO  [id number] FOR FURTHER IDENTIFICATION AND SUSCEPTIBILITIES     Recent Results (from the past 24 hour(s))   CULTURE, RESPIRATORY/SPUTUM/BRONCH W GRAM STAIN    Collection Time: 04/25/18  9:40 AM   Result Value Ref Range    Special Requests: NO SPECIAL REQUESTS      GRAM STAIN 2+ WBCS SEEN      GRAM STAIN FEW YEAST WITH PSEUDOHYPHAE      Culture result: PENDING    KOH, OTHER SOURCES    Collection Time: 04/25/18  9:40 AM   Result Value Ref Range    Special Requests: NO SPECIAL REQUESTS      PATEL RARE  BUDDING YEAST       GLUCOSE, POC    Collection Time: 04/25/18 11:40 AM   Result Value Ref Range    Glucose (POC) 223 (H) 65 - 100 mg/dL    Performed by Jo Ann Vogel    GLUCOSE, POC    Collection Time: 04/25/18  6:11 PM   Result Value Ref Range    Glucose (POC) 144 (H) 65 - 100 mg/dL    Performed by Jo Ann Vogel    METABOLIC PANEL, BASIC    Collection Time: 04/25/18  6:42 PM   Result Value Ref Range    Sodium 144 136 - 145 mmol/L    Potassium 3.4 (L) 3.5 - 5.1 mmol/L    Chloride 108 97 - 108 mmol/L    CO2 29 21 - 32 mmol/L    Anion gap 7 5 - 15 mmol/L    Glucose 123 (H) 65 - 100 mg/dL    BUN 60 (H) 6 - 20 MG/DL    Creatinine 1.83 (H) 0.55 - 1.02 MG/DL    BUN/Creatinine ratio 33 (H) 12 - 20      GFR est AA 32 (L) >60 ml/min/1.73m2    GFR est non-AA 26 (L) >60 ml/min/1.73m2    Calcium 9.1 8.5 - 10.1 MG/DL   GLUCOSE, POC    Collection Time: 04/25/18 11:20 PM   Result Value Ref Range    Glucose (POC) 100 65 - 100 mg/dL    Performed by Mario Alberto Aguilar    METABOLIC PANEL, BASIC    Collection Time: 04/26/18  3:48 AM   Result Value Ref Range    Sodium 144 136 - 145 mmol/L    Potassium 3.3 (L) 3.5 - 5.1 mmol/L    Chloride 108 97 - 108 mmol/L    CO2 27 21 - 32 mmol/L    Anion gap 9 5 - 15 mmol/L    Glucose 112 (H) 65 - 100 mg/dL    BUN 63 (H) 6 - 20 MG/DL    Creatinine 1.83 (H) 0.55 - 1.02 MG/DL    BUN/Creatinine ratio 34 (H) 12 - 20      GFR est AA 32 (L) >60 ml/min/1.73m2    GFR est non-AA 26 (L) >60 ml/min/1.73m2    Calcium 9.3 8.5 - 10.1 MG/DL   CBC WITH AUTOMATED DIFF    Collection Time: 04/26/18  3:48 AM   Result Value Ref Range    WBC 15.5 (H) 3.6 - 11.0 K/uL    RBC 2.34 (L) 3.80 - 5.20 M/uL    HGB 6.7 (L) 11.5 - 16.0 g/dL    HCT 21.4 (L) 35.0 - 47.0 %    MCV 91.5 80.0 - 99.0 FL    MCH 28.6 26.0 - 34.0 PG    MCHC 31.3 30.0 - 36.5 g/dL    RDW 24.2 (H) 11.5 - 14.5 %    PLATELET 163 609 - 486 K/uL    MPV 9.8 8.9 - 12.9 FL    NRBC 1.5 (H) 0  WBC    ABSOLUTE NRBC 0.24 (H) 0.00 - 0.01 K/uL    NEUTROPHILS 76 (H) 32 - 75 %    LYMPHOCYTES 10 (L) 12 - 49 %    MONOCYTES 12 5 - 13 %    EOSINOPHILS 0 0 - 7 %    BASOPHILS 0 0 - 1 %    IMMATURE GRANULOCYTES 2 (H) 0.0 - 0.5 %    ABS. NEUTROPHILS 11.7 (H) 1.8 - 8.0 K/UL    ABS.  LYMPHOCYTES 1.6 0.8 - 3.5 K/UL ABS. MONOCYTES 1.9 (H) 0.0 - 1.0 K/UL    ABS. EOSINOPHILS 0.0 0.0 - 0.4 K/UL    ABS. BASOPHILS 0.0 0.0 - 0.1 K/UL    ABS. IMM. GRANS. 0.3 (H) 0.00 - 0.04 K/UL    DF SMEAR SCANNED      PLATELET COMMENTS Large Platelets      RBC COMMENTS ANISOCYTOSIS  2+        RBC COMMENTS OVALOCYTES  PRESENT        RBC COMMENTS POLYCHROMASIA  PRESENT        RBC COMMENTS HYPOCHROMIA  1+        RBC COMMENTS SCHISTOCYTES  PRESENT        RBC COMMENTS SPHEROCYTES  PRESENT       PHOSPHORUS    Collection Time: 04/26/18  3:48 AM   Result Value Ref Range    Phosphorus 3.5 2.6 - 4.7 MG/DL   MAGNESIUM    Collection Time: 04/26/18  3:48 AM   Result Value Ref Range    Magnesium 1.5 (L) 1.6 - 2.4 mg/dL   GLUCOSE, POC    Collection Time: 04/26/18  6:55 AM   Result Value Ref Range    Glucose (POC) 123 (H) 65 - 100 mg/dL    Performed by Migue Jameson        I have reviewed the flowsheets. Chart and Pertinent Notes have been reviewed. No change in PMH ,family and social history from Consult note.       Marilu Silva MD

## 2018-04-26 NOTE — PROGRESS NOTES
[facility]  Pulmonary, Critical Care, and Sleep Medicine  Name: Darrick England MRN: [id number]   : 1935 [facility]: [facility] [place] [address]   Date: 2018          Impression Plan   · Metastatic breast cancer- new dx- left breast mass, diffuse skeletal mets. · Sepsis- WBC climbing- being treated for pansens Ecoli UTI with zosyn but appearing more septic today- ? Upper pole infection? Concomitant ESBL  · AMS/depressed LOC- suspect metabolic encephalopathy ( sepsis) but CVA possible  · Acute resp failure- CXR with pulm edema pattern- and likely basilar HCAP- LMS mucous plugging likely. Partial Left lung ATX on CXR today   · JEROD  · NSTEMI- ECHO EF 40% 1. Repeat bronch today  2. Continue current abx  3. WBC coming down  4. Can go to IMCU  5. Get MRI brain today  6. To get colonoscopy today  7. Diuresis  8. Pulm toilet regimen  9. D/w pts son at length and I answered all of his questions to the best of my abilities and to his apparent satisfaction. Pt is acutely ill . Pt is clinically unstable .  At risk for decline due to sepsis/resp failure    Medical Decision Making Today  · I have reviewed the flowsheet and previous days notes  · One or more chronic illnesses with severe exacerbation, progression or side effects of treatment  · Acute or chronic illness that poses a threat to life or bodily function  · Abrupt change in neurologic status  · Review and order of Clinical lab tests  · Review and Order of Radiology tests  · Review and Order of Medicine tests  · Discuss case with Specialist MD  · Independent visualization of Image  · Review and summarize records or history fromprevious days notes  · Review and summarize records or history from outside facility  · I have personally reviewed the patients ECG / Tele       Radiology  ( personally reviewed) [place] [facility] left lung ATX a little better   ABG Recent Labs      18   0505  18   1227  18   0331   PHI  7.360  7.394  7.365   PO2I  81 87  94   PCO2I  45.5*  41.0  46.3*          Subjective/Interval History:   I have reviewed the flowsheet and previous days notes. Review of systems not obtained due to patient factors. More alert, follows commands. Objective:     Mode Rate Tidal Volume Pressure FiO2 PEEP            30 %       Vital Signs:     TMAX(24)      Intake/Output:   Last shift:      Ventilator Volumes  Vt Spont (ml): 458 ml  Ve Observed (l/min): 7.8 l/min  Last 3 shifts:  [id number]    Intake/Output Summary (Last 24 hours) at 04/26/18 0916  Last data filed at 04/26/18 0700   Gross per 24 hour   Intake              100 ml   Output             1304 ml   Net            -1204 ml     EXAM:   GENERAL: well developed and in mild distress, HEENT:  PERRL, EOMI, no alar flaring or epistaxis, oral mucosa moist without cyanosis, NECK:  no jugular vein distention, no retractions, no thyromegaly or masses, LUNGS: decreased breath sounds billaterally and with rhonchi , HEART:  Regular rate and rhythm with no MGR; no edema is present, ABDOMEN:  soft with no tenderness, bowel sounds present, EXTREMITIES:  warm with no cyanosis and SKIN:  no jaundice or ecchymosis        Data    I have personally reviewed data, flowsheets for the last 24 hours.         Labs:  Recent Labs      04/26/18 0348 04/25/18 0453  04/24/18 0319   WBC  15.5*  20.2*  20.8*   HGB  6.7*  7.8*  8.2*   HCT  21.4*  24.9*  26.2*   PLT  333  409*  422*     Recent Labs      04/26/18 0348 04/25/18   1842  04/25/18 0453  04/24/18   0319   NA  144  144  142  138  140   K  3.3*  3.4*  2.9*  3.1*  3.1*   CL  108  108  105  103  104   CO2  27  29  25  23  25   GLU  112*  123*  231*  188*  192*   BUN  63*  60*  48*  50*  53*   CREA  1.83*  1.83*  1.86*  1.86*  1.79*   CA  9.3  9.1  10.0  10.2*  10.2*   MG  1.5*   --   1.9  1.8   PHOS  3.5   --    --   4.0   ALB   --    --    --   2.9*   SGOT   --    --    --   39*   ALT   --    --    --   27       Robin Rachel MD  Pulmonary [address]

## 2018-04-26 NOTE — CONSULTS
Infectious Disease Consult    Today's Date: 4/26/2018   Admit Date: 4/16/2018    Impression:   · Metastatic breast cancer  · Colon cancer  · Pneumonia/mucous plugging  · Sepsis     Plan:   · Continue current antibiotic regimen  · Follow up cultures and pending studies  · Work up any new fevers    Anti-infectives:     Inpat Anti-Infectives     Start     Ordered Stop    04/27/18 1200  fluconazole in 0.9% NaCl 100 mg IVPB  100 mg,   IntraVENous,   EVERY 24 HOURS      04/26/18 1117 --    04/26/18 1400  levoFLOXacin (LEVAQUIN) 500 mg in D5W IVPB  500 mg,   IntraVENous,   EVERY 48 HOURS      04/24/18 1301 --    04/25/18 1708  bacitracin 500 unit/gram packet 1 Packet  1 Packet,   Topical,   AS NEEDED      04/25/18 1714 --    04/24/18 1300  meropenem (MERREM) 500 mg in 0.9% sodium chloride (MBP/ADV) 50 mL  0.5 g,   IntraVENous,   EVERY 12 HOURS      04/24/18 1239 --          Subjective:   Date of Consultation:  April 26, 2018  Referring Physician: Dr Gabino Norton    Patient is a 82 y.o. female admitted with dyspnea and AMI. She has had a fairly jhon course with difficulties from a respiratory standpoint, especially. She has had a couple of bronchoscopic procedures for mucous plugging and is doing better overall. She is on IV antibiotics and we are asked to see her in consultation.       Patient Active Problem List   Diagnosis Code    DM [facility]diabetes mellitus) ([facility] E11.9    Hypothyroid E03.9    Colon cancer ([facility] C18.9    H/O: CVA     Microalbuminuria R80.9    Age-related osteoporosis without current pathological fracture M81.0    Essential hypertension I10    Anemia D64.9    Hyperlipidemia E78.5    Carotid bruit R09.89    Claudication (HCC) I73.9    Weakness of left arm R29.898    PAD (peripheral artery disease) ([facility]) I73.9    Weakness due to cerebrovascular accident [id number]    Neuropathy in diabetes ([facility] E11.40    Occlusion and stenosis of carotid artery without mention of cerebral infarction I65.29    Seropositive rheumatoid arthritis of multiple sites ([facility] M05.79    Primary osteoarthritis of both knees M17.0    Long-term use of immunosuppressant medication Z79.899    Statin intolerance Z78.9    Asymptomatic hyperuricemia E79.0    Vitamin D deficiency E55.9    CKD (chronic kidney disease) stage 3, GFR 30-59 ml/min N18.3    SOB (shortness of breath) R06.02    Altered mental status, unspecified Y19.05    Acute systolic heart failure (HCC) I50.21     Past Medical History:   Diagnosis Date    Anemia 2009    Colon cancer ([facility] 2009    surgery/chemo    Colon cancer ([facility] 2009    DM (diabetes mellitus) ([facility] 2009    GERD (gastroesophageal reflux disease)     H/O: CVA 2009    slight l sided weakness    Hypothyroid 2009    Ill-defined condition     seasonal allergies    Microalbuminuria 2009    Osteoporosis 2009    Other and unspecified hyperlipidemia 2010    Rheumatoid arthritis involving ankle ([facility] 2016    Rheumatoid arthritis(714.0) 2009    Unspecified essential hypertension 2009    Weakness due to cerebrovascular accident       Family History   Problem Relation Age of Onset    Diabetes Mother     Stroke Mother     Diabetes Sister     Other Sister      fell and hit her head -  of this   [facility] Diabetes Brother     Cancer Father      stomach    Diabetes Sister       Social History   Substance Use Topics    Smoking status: Never Smoker    Smokeless tobacco: Never Used    Alcohol use No     Past Surgical History:   Procedure Laterality Date    HX COLECTOMY      HX HYSTERECTOMY      HX OTHER SURGICAL      colonoscopies numerous since       Prior to Admission medications    Medication Sig Start Date End Date Taking? Authorizing Provider   glimepiride (AMARYL) 1 mg tablet Take 1 mg by mouth every morning. Yes Phys Other, MD   levothyroxine (SYNTHROID) 88 mcg tablet Take 88 mcg by mouth Daily (before breakfast).    Yes Historical Provider   etanercept (ENBREL) 50 mg/mL (0.98 mL) injection 1 mL by SubCUTAneous route every seven (7) days. 3/16/18  Yes Nydia Mcginnis MD   FORTEO 20 mcg/dose - 600 mcg/2.4 mL pnij injection USE 1 INJECTION (20 MCG OR 0.08ML) UNDER THE SKIN ONCE DAILY. 3/12/18  Yes Nydia Mcginnis MD   clopidogrel (PLAVIX) 75 mg tab Take 1 Tab by mouth daily. 3/5/18  Yes Brandon Li MD   evolocumab MAIN LINE [facility] pen injection 1 mL by SubCUTAneous route every fourteen (14) days. 1/11/18  Yes Cholo Wilson MD   folic acid (FOLVITE) 1 mg tablet TAKE ONE TABLET BY MOUTH DAILY 10/9/17  Yes Nydia Mcginnis MD   carvedilol (COREG) 25 mg tablet Take 25 mg by mouth two (2) times a day. 11/11/16  Yes Historical Provider   potassium chloride SR (KLOR-CON 10) 10 mEq tablet Take 20 mEq by mouth daily. 12/18/16  Yes Historical Provider   chlorthalidone (HYGROTEN) 50 mg tablet Take 50 mg by mouth daily. Yes Historical Provider   sitaGLIPtin (JANUVIA) 25 mg tablet Take 50 mg by mouth daily. Yes Historical Provider   VIT C/VIT E/LUTEIN/MIN/OMEGA-3 (OCUVITE PO) Take 1 Tab by mouth daily. Yes Historical Provider   MAGNESIUM MALATE Take 400 mg by mouth daily. Yes Historical Provider   MULTIVITAMIN WITH MINERALS (HAIR,SKIN & NAILS PO) Take 1 Tab by mouth daily. Yes Historical Provider   aspirin delayed-release 81 mg tablet Take 81 mg by mouth daily. Yes Historical Provider   metFORMIN (GLUCOPHAGE) 1,000 mg tablet Take 1 Tab by mouth two (2) times daily (with meals). 4/11/12  Yes Kelly Romero MD   OMEGA-3 FATTY ACIDS/FISH OIL (OMEGA 3 FISH OIL PO) Take 300 mg by mouth daily. Yes Historical Provider   ascorbate calcium (MAKAYLA-C) 500 mg Tab Take 1,000 mg by mouth daily. 4/15/11  Yes Historical Provider   CHOLECALCIFEROL, VITAMIN D3, (VITAMIN D-3 PO) Take 1,000 Units by mouth daily.    Yes Historical Provider       Allergies   Allergen Reactions    Statins-Hmg-Coa Reductase Inhibitors Other (comments)     Intolerant to statins  Sulfa (Sulfonamide Antibiotics) Other (comments)     syncope        Review of Systems:  Pertinent items are noted in the History of Present Illness. Objective:     Visit Vitals    /71    Pulse 92    Temp 98.4 °F (36.9 °C)    Resp 24    Ht 5' 1\" (1.549 m)    Wt 61.9 kg (136 lb 7.4 oz)    SpO2 93%    BMI 25.78 kg/m2     Temp (24hrs), Av.7 °F (36.5 °C), Min:96.7 °F (35.9 °C), Max:98.7 °F (37.1 °C)       Lines:  PICC:         and Peripheral IV:            Physical Exam:  Lungs:  rales R anterior, L anterior  Heart:  regular rate and rhythm  Abdomen:  soft, non-tender.  Bowel sounds normal. No masses,  no organomegaly  Skin:  no rash or abnormalities    Data Review:     CBC:  Recent Labs      188  18   0453  18   0319   WBC  15.5*  20.2*  20.8*   GRANS  76*  86*  82*   MONOS  12  4*  10   EOS  0  0  0   ANEU  11.7*  17.4*  17.1*   ABL  1.6  1.4  1.5   HGB  6.7*  7.8*  8.2*   HCT  21.4*  24.9*  26.2*   PLT  333  409*  422*       BMP:  Recent Labs      18   0348  18   1842  18   0453   CREA  1.83*  1.83*  1.86*   BUN  63*  60*  48*   NA  144  144  142   K  3.3*  3.4*  2.9*   CL  108  108  105   CO2  27  29  25   AGAP  9  7  12   GLU  112*  123*  231*       LFTS:  Recent Labs      18   0319   TBILI  0.6   ALT  27   SGOT  39*   AP  304*   TP  7.8   ALB  2.9*       Microbiology:     All Micro Results     Procedure Component Value Units Date/Time    CULTURE, RESPIRATORY/SPUTUM/BRONCH Laurance Lias STAIN [[id number]]  (Abnormal) Collected:  18 0931    Order Status:  Completed Specimen:  Sputum from Bronchial Washing Updated:  18 9452     Special Requests: NO SPECIAL REQUESTS        GRAM STAIN 2+ WBCS SEEN                 FEW YEAST WITH PSEUDOHYPHAE     Culture result:         MODERATE APPARENT HUDSON ALBICANS (A)      SO FAR    CULTURE, RESPIRATORY/SPUTUM/BRONCH Gume Clancy [[id number]] Collected:  18 0910    Order Status:  Completed Specimen: Sputum from Bronchial Washing Updated:  04/26/18 1049     Special Requests: NO SPECIAL REQUESTS        GRAM STAIN 2+ WBCS SEEN         2+ BUDDING YEAST         2+ EPITHELIAL CELLS SEEN        Culture result: PENDING    CULTURE, BLOOD, PAIRED [[id number]] Collected:  04/22/18 0735    Order Status:  Completed Specimen:  Blood Updated:  04/26/18 0559     Special Requests: NO SPECIAL REQUESTS        Culture result: NO GROWTH 4 DAYS       PATEL, OTHER SOURCES [[id number]] Collected:  04/25/18 0940    Order Status:  Completed Specimen:  Other from Bronchial Washing Updated:  04/25/18 1254     Special Requests: NO SPECIAL REQUESTS        KOH --        RARE  BUDDING YEAST      CULTURE, FUNGUS [[id number]] Collected:  04/25/18 0940    Order Status:  Completed Specimen:  Wound from Bronchial Washing Updated:  04/25/18 170 [facility] [[id number]] Collected:  04/25/18 0940    Order Status:  Canceled Specimen:  Bronchial Washing Updated:  04/25/18 1010    CULTURE, BLOOD [[id number]] Collected:  04/19/18 2036    Order Status:  Completed Specimen:  Blood from Blood Updated:  04/25/18 0537     Special Requests: NO SPECIAL REQUESTS        Culture result: NO GROWTH 6 DAYS       CULTURE, BLOOD [[id number]] Collected:  04/19/18 2036    Order Status:  Completed Specimen:  Blood from Blood Updated:  04/25/18 0537     Special Requests: NO SPECIAL REQUESTS        Culture result: NO GROWTH 6 DAYS       CULTURE, URINE [[id number]] Collected:  04/22/18 0735    Order Status:  Completed Specimen:  Urine from [place] Specimen Updated:  04/23/18 1142     Special Requests: NO SPECIAL REQUESTS        [place] Count <1,000 CFU/ML        Culture result: NO GROWTH 1 DAY       CULTURE, BLOOD, PAIRED [[id number]] Collected:  04/18/18 0815    Order Status:  Completed Specimen:  Blood Updated:  04/23/18 0511     Special Requests: NO SPECIAL REQUESTS        Culture result: NO GROWTH 5 DAYS       CULTURE, URINE [[id number]]  (Abnormal) Collected:  04/20/18 0437 Order Status:  Completed Specimen:  Urine Updated:  04/22/18 1140     Special Requests: --        NO SPECIAL REQUESTS  Reflexed from [id number]       [place] Count --        97776  COLONIES/mL       Culture result: GRAM NEGATIVE RODS (A)          PLEASE REFER TO  [id number] FOR FURTHER IDENTIFICATION AND SUSCEPTIBILITIES    CULTURE, URINE [[id number]]  (Abnormal)  (Susceptibility) Collected:  04/19/18 1205    Order Status:  Completed Specimen:  Urine from Clean catch Updated:  04/21/18 1246     Special Requests: IF SAMPLE AVAILABLE FROM LAST EVENING PLEASE ADD TO THE SAMPLE FROM 4/18     [place] Count --        >100,000  COLONIES/mL       Culture result:         ESCHERICHIA COLI (PREDOMINATING) (A)              PSEUDOMONAS SPECIES (1,000 ORGANISMS PER ML) (A)          Imaging:   Reviewed     Signed By: Paradise Stevenson MD     April 26, 2018

## 2018-04-26 NOTE — PROGRESS NOTES
1940 Bedside and Verbal shift change report given to Milly Bosworth, ARIANA (oncoming nurse) by Noemi Arriaza RN (offgoing nurse). Report included the following information SBAR, Kardex, ED Summary, OR Summary, Procedure Summary, Intake/Output, MAR, Accordion, Recent Results, Cardiac Rhythm NSR and Alarm Parameters . 2000 Assessment performed. Pt responds to voice, drowsy, oriented to self only, pupil equal, brisk, and reactive, MUJICA, following commands, pulses palpable. Family at bedside. 2020 Warm water enema given. 2200 Warm water enema given. Output clear. 0730 Bedside and Verbal shift change report given to Mila Virk (oncoming nurse) by Milly Bosworth, RN (offgoing nurse). Report included the following information SBAR, Kardex, ED Summary, Procedure Summary, Intake/Output, MAR, Accordion, Recent Results, Cardiac Rhythm NSR and Alarm Parameters .

## 2018-04-26 NOTE — PROGRESS NOTES
Hospitalist Progress Note  Isaiah Acevedo MD  Answering service: [phone] OR 36 from in house phone  Cell: [phone]      Date of Service:  2018  NAME:  Patric Smith  :  1935  MRN:  [id number]      Admission Summary:   The patient is an 57-year-old female with history of diabetes and rheumatoid arthritis who presented to the emergency room via EMS with complaints of shortness of breath    Interval history / Subjective:   Feels better, more alert    RRT was called at 0530 on 18, fore more confusion, lethargic and labor breathing     Assessment & Plan:     Acute on chronic hypoxic/hypercarbic respiratory failure due to pulmonary edema, pneumonia  -on BiPAP support, prn duo neb,  bumex 2 mg bid, monitor pulse ox  -chest x ray  No significant change in the appearance of the chest with near complete opacification of the left hemithorax mainstem bronchus suggests endobronchial mucous plugging.  -s/p bronchoscopy on  and on  suctioned and cleared  -intensivist on board    Acute encephalopathy possible due to metabolic  -RRT was called for restless and agitation early this morning, patient become more confused, disoriented and lethargic, wakeful to voice, non verbal but follows simple command  -continue neurocheck and supportive care  -CT head on  volume loss and minimal white matter disease.  No acute intracranial [place], Sinus mucosal inflammatory change  -she will go for MRI of brain   -neurologist onboard    Sepsis secondary to E Coli UTI POA  -now pneumonia   -on zosyn and vancomycin, meropenem, and fluconazole   -urine cx  grow e coli, pseudomanas sp  -blood cx  no growth    NSTEMI  -aspirin switched to supp, on plavix, fish oil, iv labetalol and nitrodrip  -cardiologist on board    Acute systolic CHF NYHA Class IV  -on iv labetalol,  ARB is held due to renal insufficiency   -monitor I/O and daily weight    JEROD on CKD stage III  -low urine out put  -bumex 2 mg iv bid  -monitor renal function, urine output  -nephrologist on board    Possible metastatic left breast cancer  -on armidex   -Hem/Onc on board    HTN  -on iv metoprolol, nitro gtt, received iv hydralazine, monitor BP    DM-II  -continue insulin sliding scale, monitor finger stick glucose    Hypothyroidism  -continue synthroid    Hx of GERD  -continue pepcid    Elevated liver enzyme   -possible related to liver lesion, monitor LFT    Anemia multifactorial  -Hgb 6.7, s/p 2 units pRBC on 4/26, repeat H/H , monitor cbc    Hx of colon cancer 25 years ago  -according to son, there is a work-up planned with [facility] regarding the possibility of recurrent CA (based on lesions seen in the calvarium on MRI - 3/19). -hem/onc on board    Code status: PAR, at risk for decompensation, No CPR but ok for ACLS/BLS meds, shock and intubation  Case discussed with her son at bedside, at risk for decompensation,  consult to palliative care team.     Code status: PARTIAL   DVT prophylaxis:SCD    Care Plan discussed with: Patient/Family and Nurse  Disposition: Transfer to ICU      Case discussed with her son, Mi Acevedo , questions answered     Hospital Problems  Date Reviewed: 4/16/2018          Codes Class Noted POA    Acute systolic heart failure ([facility] ICD-10-CM: I50.21  ICD-9-CM: 428.21  4/24/2018 Unknown        Altered mental status, unspecified ICD-10-CM: R41.82  ICD-9-CM: 780.97  4/23/2018 Unknown        * (Principal)SOB (shortness of breath) ICD-10-CM: R06.02  ICD-9-CM: 786.05  4/16/2018 Unknown                Vital Signs:    Last 24hrs VS reviewed since prior progress note.  Most recent are:  Visit Vitals    /67    Pulse 88    Temp 96.7 °F (35.9 °C)    Resp 20    Ht 5' 1\" (1.549 m)    Wt 60.4 kg (133 lb 2.5 oz)    SpO2 100%    BMI 25.16 kg/m2         Intake/Output Summary (Last 24 hours) at 04/26/18 4317  Last data filed at 04/26/18 1300 Gross per 24 hour   Intake            352.6 ml   Output             1660 ml   Net          -1307.4 ml        Physical Examination:             Constitutional:  On BiPAP, not in distress, cooperative, pleasant    ENT:  Oral mucous moist, oropharynx benign. Neck supple,    Resp:  Decrease bronchial breath sound, few wheezing and rhonic bilaterally. No accessory muscle use   CV:  Regular rhythm, normal rate, no murmurs, gallops, rubs    GI:  Soft, non distended, non tender. normoactive bowel sounds, no hepatosplenomegaly     Musculoskeletal:  pedal edema    Neurologic:  Conscious and alert,  follows simple command     Skin:  Good turgor, no rashes or ulcers       Data Review:    Review and/or order of clinical lab test      Labs:     Recent Labs      04/26/18 0348 04/25/18 0453   WBC  15.5*  20.2*   HGB  6.7*  7.8*   HCT  21.4*  24.9*   PLT  333  409*     Recent Labs      04/26/18 0348  04/25/18   1842  04/25/18 0453  04/24/18 0319   NA  144  144  142  138  140   K  3.3*  3.4*  2.9*  3.1*  3.1*   CL  108  108  105  103  104   CO2  27  29  25  23  25   BUN  63*  60*  48*  50*  53*   CREA  1.83*  1.83*  1.86*  1.86*  1.79*   GLU  112*  123*  231*  188*  192*   CA  9.3  9.1  10.0  10.2*  10.2*   MG  1.5*   --   1.9  1.8   PHOS  3.5   --    --   4.0     Recent Labs      04/24/18 0319   SGOT  39*   ALT  27   AP  304*   TBILI  0.6   TP  7.8   ALB  2.9*   GLOB  4.9*     No results for input(s): INR, PTP, APTT in the last 72 hours. No lab exists for component: INREXT, INREXT   No results for input(s): FE, TIBC, PSAT, FERR in the last 72 hours. No results found for: FOL, RBCF   No results for input(s): PH, PCO2, PO2 in the last 72 hours.   Recent Labs      04/24/18 0319   TROIQ  1.88*     Lab Results   Component Value Date/Time    Cholesterol, total 74 04/16/2018 04:21 AM    HDL Cholesterol 25 04/16/2018 04:21 AM    LDL, calculated 31.6 04/16/2018 04:21 AM    Triglyceride 87 04/16/2018 04:21 AM CHOL/HDL Ratio 3.0 04/16/2018 04:21 AM     Lab Results   Component Value Date/Time    Glucose (POC) 141 (H) 04/26/2018 12:45 PM    Glucose (POC) 123 (H) 04/26/2018 06:55 AM    Glucose (POC) 100 04/25/2018 11:20 PM    Glucose (POC) 144 (H) 04/25/2018 06:11 PM    Glucose (POC) 223 (H) 04/25/2018 11:40 AM     Lab Results   Component Value Date/Time    Color YELLOW/STRAW 04/20/2018 04:30 AM    Appearance TURBID (A) 04/20/2018 04:30 AM    Specific gravity 1.029 04/20/2018 04:30 AM    pH (UA) 5.0 04/20/2018 04:30 AM    Protein 100 (A) 04/20/2018 04:30 AM    Glucose NEGATIVE  04/20/2018 04:30 AM    Ketone NEGATIVE  04/20/2018 04:30 AM    Bilirubin NEGATIVE  04/20/2018 04:30 AM    Urobilinogen 0.2 04/20/2018 04:30 AM    Nitrites NEGATIVE  04/20/2018 04:30 AM    Leukocyte Esterase LARGE (A) 04/20/2018 04:30 AM    Epithelial cells FEW 04/20/2018 04:30 AM    Bacteria 3+ (A) 04/20/2018 04:30 AM    WBC >100 (H) 04/20/2018 04:30 AM    RBC 5-10 04/20/2018 04:30 AM         Medications Reviewed:     Current Facility-Administered Medications   Medication Dose Route Frequency    dexmedeTOMidine (PRECEDEX) 400 mcg in 0.9% sodium chloride 100 mL infusion  0.2-0.7 mcg/kg/hr IntraVENous TITRATE    sodium chloride (NS) flush 5-10 mL  5-10 mL IntraVENous Q8H    sodium chloride (NS) flush 5-10 mL  5-10 mL IntraVENous PRN    lidocaine (XYLOCAINE) 20 mg/mL (2 %) injection 6 mg  0.3 mL Other ONCE    albuterol-ipratropium (DUO-NEB) 2.5 MG-0.5 MG/3 ML  3 mL Nebulization Q4H RT    budesonide (PULMICORT) 500 mcg/2 ml nebulizer suspension  500 mcg Nebulization BID RT    acetylcysteine (MUCOMYST) 200 mg/mL (20 %) solution 200 mg  200 mg Nebulization QID RT    hydrALAZINE (APRESOLINE) 20 mg/mL injection 10 mg  10 mg IntraVENous Q6H PRN    [START ON 4/27/2018] fluconazole in 0.9% NaCl 100 mg IVPB  100 mg IntraVENous Q24H    0.9% sodium chloride infusion 250 mL  250 mL IntraVENous PRN    acetaminophen (TYLENOL) tablet 650 mg  650 mg Oral Q4H PRN    potassium chloride in water 10 mEq/50 mL IVPB        carvedilol (COREG) tablet 6.25 mg  6.25 mg Oral BID    sodium chloride (NS) flush 20 mL  20 mL InterCATHeter PRN    sodium chloride (NS) flush 10 mL  10 mL InterCATHeter Q24H    sodium chloride (NS) flush 10 mL  10 mL InterCATHeter PRN    sodium chloride (NS) flush 10 mL  10 mL InterCATHeter Q8H    alteplase (CATHFLO) 1 mg in sterile water (preservative free) 1 mL injection  1 mg InterCATHeter PRN    bacitracin 500 unit/gram packet 1 Packet  1 Packet Topical PRN    meropenem (MERREM) 500 mg in 0.9% sodium chloride (MBP/ADV) 50 mL  0.5 g IntraVENous Q12H    levoFLOXacin (LEVAQUIN) 500 mg in D5W IVPB  500 mg IntraVENous Q48H    bumetanide (BUMEX) injection 2 mg  2 mg IntraVENous Q12H    sodium chloride (NS) flush 10-30 mL  10-30 mL InterCATHeter PRN    sodium chloride (NS) flush 10 mL  10 mL InterCATHeter Q24H    sodium chloride (NS) flush 10 mL  10 mL InterCATHeter PRN    sodium chloride (NS) flush 10-40 mL  10-40 mL InterCATHeter Q8H    sodium chloride (NS) flush 20 mL  20 mL InterCATHeter Q24H    alteplase (CATHFLO) 1 mg in sterile water (preservative free) 1 mL injection  1 mg InterCATHeter PRN    anastrozole (ARIMIDEX) tablet 1 mg  1 mg Oral DAILY    0.9% sodium chloride infusion 250 mL  250 mL IntraVENous PRN    epoetin celio (EPOGEN;PROCRIT) injection 10,000 Units  10,000 Units SubCUTAneous Q MON, WED & FRI    albuterol-ipratropium (DUO-NEB) 2.5 MG-0.5 MG/3 ML  3 mL Nebulization Q6H PRN    glucose chewable tablet 16 g  4 Tab Oral PRN    dextrose (D50W) injection syrg 12.5-25 g  12.5-25 g IntraVENous PRN    glucagon (GLUCAGEN) injection 1 mg  1 mg IntraMUSCular PRN    insulin lispro (HUMALOG) injection   SubCUTAneous AC&HS    prochlorperazine (COMPAZINE) with saline injection 5 mg  5 mg IntraVENous Q8H PRN    ascorbic acid (vitamin C) (VITAMIN C) tablet 500 mg  500 mg Oral DAILY    cholecalciferol (VITAMIN D3) tablet 1,000 Units  1,000 Units Oral DAILY    folic acid (FOLVITE) tablet 1 mg  1 mg Oral DAILY    magnesium oxide (MAG-OX) tablet 400 mg  400 mg Oral DAILY    therapeutic multivitamin (THERAGRAN) tablet 1 Tab  1 Tab Oral DAILY    fish oil-omega-3 fatty acids 340-1,000 mg capsule 1 Cap  1 Cap Oral DAILY    sodium chloride (NS) flush 5-10 mL  5-10 mL IntraVENous Q8H    sodium chloride (NS) flush 5-10 mL  5-10 mL IntraVENous PRN    ondansetron (ZOFRAN) injection 4 mg  4 mg IntraVENous Q4H PRN    levothyroxine (SYNTHROID) tablet 88 mcg  88 mcg Oral ACB     ______________________________________________________________________  EXPECTED LENGTH OF STAY: 4d 12h  ACTUAL LENGTH OF STAY:          10                 Donaldo Sarabia MD

## 2018-04-26 NOTE — PROCEDURES
[facility]  Bronchoscopy Report    Procedure: Therapeutic bronchoscopy. Indication: Mucus Plugging    Consent/Treatment: Informed consent was obtained from the  family after risks, benefits and alternatives were explained. Timeout verified the correct patient and correct procedure. Anesthesia:   Topical sedation to nares, mouth, and tracheobronchial tree with lidocaine  Moderate sedation with precedex drip was used    Moderate ( conscious ) sedation was administered by the endoscopy nurse and supervised by the endoscopist. The following parameters were monitored: oxygen saturation, heart rate, blood pressure, respiratory rate, EKG, adequacy of pulmonary ventilation and response to care. Total physician intraservice time was 30 min    Procedure Details:   -- The bronchoscope was introduced orally with use of a bite block. -- The vocal cords thrush seen in hypophayrnx. -- The trachea and nikolay were completely inspected and were found to be normal.  -- The right-sided endobronchial anatomy was completely inspected and was found to be normal.  -- The left-sided endobronchial anatomy was completely inspected and partly obstructing plug ( 50%) suctioned from mid LMS- thick /yellow. Annamary Ripa      Specimens:   Bronchial wash LLL-sent for GS/CX    Rapid On-Site Evaluation: A preliminary diagnosis of Mucous plugging LMS 50% occluding- suctioned clear    Complications: none    Estimated Blood Loss: Minimal    Marquis Melida MD

## 2018-04-26 NOTE — CONSULTS
Palliative Medicine Consult  Williams: [phone] ([phone])    Patient Name: Joesph Espana  YOB: 1935    Date of Initial Consult: 18  Reason for Consult: care decisions  Requesting Provider: Dr. Mcmahon Host  Primary Care Physician: Sung Tadeo MD     SUMMARY:   Joesph Espana is a 82 y.o. with a past history of rheumatoid arthritis, CKD stage 3, DM w neuropathy, hx colon cancer 26 years ago,  who was admitted on 2018 from home  with a diagnosis of shortness of breath, pulmonary edema. Current medical issues leading to Palliative Medicine involvement include: new diagnosis of metastatic breast cancer (biopsy pending),  + bone mets, possible liver mets, now in ICU with recurrent respiratory failure / on BIPAP. NSTEMI at admission (peak troponin 18.9), anemia (transfused  one unit), UTI (e Coli), acute on chronic renal failure (cr 1.8)    Patient and her  moved to [place] in  from [facility]. Patient's  of over 48 years  in 2017 of pancreatic cancer.  (w hospice services). Patient lives alone, but has paid caregivers. She has 3 adult sons-- son Eric Ariaspshire (oldest son), is [facility] [phone]       PALLIATIVE DIAGNOSES:   1. Acute respiratory failure, possible pneumonia  S/p Bronch  and   for mucus plugging  2. Constipation, fecal impaction  3. CHF, NSTEMI  4. Anemia  5. CKD   6. Delirium, multifactorial, medications likely contributing       PLAN:   1. Meet w/ pt and son Nahum Ramirez at bedside. Pt eating some ice cream with her aide, just came off BIPAP. Smiles, but feels too weak to engage in conversation. Has no complaints. 2. Goals remain clear, wanted to provide supportive visit as our team still following closely as we know more about her ability to recover. 3. MRI brain and ER/TX status pending. 4. Communicated plan of care with: Palliative IDT;  Mary Jett RN; Dr Sanches Killer / TREATMENT PREFERENCES:     GOALS OF CARE:  Patient/Health Care Proxy Stated Goals: Other (comment) (waiting on pathology, for now continue aggressive measures, but no CPR /shock if she dies. temporary intubation OK)  Awaiting pathology, and palliative treatment options    TREATMENT PREFERENCES:   Code Status: Partial Code    Advance Care Planning:  Advance Care Planning 4/24/2018   Patient's Healthcare Decision Maker is: Verbal statement (Legal Next of Kin remains as decision maker)   Primary Decision Maker Name Eric Jimenez   Primary Decision Maker Phone Number [phone]   Primary Decision Maker Relationship to Patient Adult child   Confirm Advance Directive Yes, not on file       Medical Interventions: Limited additional interventions   Other Instructions: Other:    As far as possible, the palliative care team has discussed with patient / health care proxy about goals of care / treatment preferences for patient. HISTORY:       HPI/SUBJECTIVE:    The patient is:   [] Verbal and participatory  [x] Non-participatory due to:     82year old female, found to have possible metastatic disease in base of skull by Head CT in March 2018, who was admitted with report of worsening SOB.     4/26/18- today pt doing okay, getting one unit PRBC and came off BIPAP about 15 min before I see her. No complaints right now.      Clinical Pain Assessment (nonverbal scale for severity on nonverbal patients):   Clinical Pain Assessment  Severity: 0     Activity (Movement): Lying quietly, normal position    Duration: for how long has pt been experiencing pain (e.g., 2 days, 1 month, years)  Frequency: how often pain is an issue (e.g., several times per day, once every few days, constant)     FUNCTIONAL ASSESSMENT:     Palliative Performance Scale (PPS):  PPS: 30       PSYCHOSOCIAL/SPIRITUAL SCREENING:     Palliative IDT has assessed this patient for cultural preferences / practices and a referral made as appropriate to needs (Cultural Services, Patient Advocacy, Ethics, etc.)    Advance Care Planning:  Advance Care Planning 4/24/2018   Patient's Healthcare Decision Maker is: Verbal statement (Legal Next of Kin remains as decision maker)   Primary Decision Maker Name Joan Louis   Primary Decision Maker Phone Number [phone]   Primary Decision Maker Relationship to Patient Adult child   Confirm Advance Directive Yes, not on file       Any spiritual / Rastafarian concerns:  [] Yes /  [x] No    Caregiver Burnout:  [] Yes /  [x] No /  [] No Caregiver Present      Anticipatory grief assessment:   [x] Normal  / [] Maladaptive       ESAS Anxiety: Anxiety: 0    ESAS Depression:   did not answer        REVIEW OF SYSTEMS:     Positive and pertinent negative findings in ROS are noted above in HPI. The following systems were [] reviewed / [x] unable to be reviewed as noted in HPI  Other findings are noted below. Patient has delirium so is unable to report her symptoms in full   Systems: constitutional, ears/nose/mouth/throat, respiratory, gastrointestinal, genitourinary, musculoskeletal, integumentary, neurologic, psychiatric, endocrine. Positive findings noted below. Modified ESAS Completed by: provider   Fatigue: 8 Drowsiness: 0     Pain: 0   Anxiety: 0     Anorexia: 3 Dyspnea: 2     Constipation: No     Stool Occurrence(s): 1        PHYSICAL EXAM:     From RN flowsheet:  Wt Readings from Last 3 Encounters:   04/26/18 136 lb 7.4 oz (61.9 kg)   04/24/18 132 lb (59.9 kg)   04/20/18 141 lb 1.5 oz (64 kg)     Blood pressure 148/62, pulse 96, temperature 98.4 °F (36.9 °C), resp. rate 22, height 5' 1\" (1.549 m), weight 136 lb 7.4 oz (61.9 kg), SpO2 100 %. Pain Scale 1: Numeric (0 - 10)  Pain Intensity 1: 0  Pain Onset 1: acute  Pain Location 1: Generalized     Pain Description 1: Aching  Pain Intervention(s) 1: Medication (see MAR)  Last bowel movement, if known:     Constitutional: awake, alert to person, only smiles and shakes head when I talk to her.    Eyes: Clear sclera  ENT: Nares patent, moist MM, off BIPAP for now  Resp: Unlabored at rest         HISTORY:     Principal Problem:    SOB (shortness of breath) (2018)    Active Problems:    Altered mental status, unspecified (2018)      Acute systolic heart failure ([address] (2018)      Past Medical History:   Diagnosis Date    Anemia 2009    Colon cancer ([address] 2009    surgery/chemo    Colon cancer ([address] 2009    DM (diabetes mellitus) [facility] 2009    GERD (gastroesophageal reflux disease)     H/O: CVA 2009    slight l sided weakness    Hypothyroid 2009    Ill-defined condition     seasonal allergies    Microalbuminuria 2009    Osteoporosis 2009    Other and unspecified hyperlipidemia 2010    Rheumatoid arthritis involving ankle ([address] 2016    Rheumatoid arthritis(714.0) 2009    Unspecified essential hypertension 2009    Weakness due to cerebrovascular accident       Past Surgical History:   Procedure Laterality Date    HX COLECTOMY      HX HYSTERECTOMY      HX OTHER SURGICAL      colonoscopies numerous since       Family History   Problem Relation Age of Onset    Diabetes Mother     Stroke Mother     Diabetes Sister     Other Sister      fell and hit her head -  of this   [facility] Diabetes Brother     Cancer Father      stomach    Diabetes Sister       History reviewed, no pertinent family history.   Social History   Substance Use Topics    Smoking status: Never Smoker    Smokeless tobacco: Never Used    Alcohol use No     Allergies   Allergen Reactions    Statins-Hmg-Coa Reductase Inhibitors Other (comments)     Intolerant to statins     Sulfa (Sulfonamide Antibiotics) Other (comments)     syncope      Current Facility-Administered Medications   Medication Dose Route Frequency    dexmedeTOMidine (PRECEDEX) 400 mcg in 0.9% sodium chloride 100 mL infusion  0.2-0.7 mcg/kg/hr IntraVENous TITRATE    sodium chloride (NS) flush 5-10 mL  5-10 mL IntraVENous Q8H    sodium chloride (NS) flush 5-10 mL  5-10 mL IntraVENous PRN    lidocaine (XYLOCAINE) 20 mg/mL (2 %) injection 6 mg  0.3 mL Other ONCE    albuterol-ipratropium (DUO-NEB) 2.5 MG-0.5 MG/3 ML  3 mL Nebulization Q4H RT    budesonide (PULMICORT) 500 mcg/2 ml nebulizer suspension  500 mcg Nebulization BID RT    acetylcysteine (MUCOMYST) 200 mg/mL (20 %) solution 200 mg  200 mg Nebulization QID RT    hydrALAZINE (APRESOLINE) 20 mg/mL injection 10 mg  10 mg IntraVENous Q6H PRN    [START ON 4/27/2018] fluconazole in 0.9% NaCl 100 mg IVPB  100 mg IntraVENous Q24H    0.9% sodium chloride infusion 250 mL  250 mL IntraVENous PRN    acetaminophen (TYLENOL) tablet 650 mg  650 mg Oral Q4H PRN    potassium chloride in water 10 mEq/50 mL IVPB        carvedilol (COREG) tablet 6.25 mg  6.25 mg Oral BID    sodium chloride (NS) flush 20 mL  20 mL InterCATHeter PRN    sodium chloride (NS) flush 10 mL  10 mL InterCATHeter Q24H    sodium chloride (NS) flush 10 mL  10 mL InterCATHeter PRN    sodium chloride (NS) flush 10 mL  10 mL InterCATHeter Q8H    alteplase (CATHFLO) 1 mg in sterile water (preservative free) 1 mL injection  1 mg InterCATHeter PRN    bacitracin 500 unit/gram packet 1 Packet  1 Packet Topical PRN    meropenem (MERREM) 500 mg in 0.9% sodium chloride (MBP/ADV) 50 mL  0.5 g IntraVENous Q12H    levoFLOXacin (LEVAQUIN) 500 mg in D5W IVPB  500 mg IntraVENous Q48H    bumetanide (BUMEX) injection 2 mg  2 mg IntraVENous Q12H    sodium chloride (NS) flush 10-30 mL  10-30 mL InterCATHeter PRN    sodium chloride (NS) flush 10 mL  10 mL InterCATHeter Q24H    sodium chloride (NS) flush 10 mL  10 mL InterCATHeter PRN    sodium chloride (NS) flush 10-40 mL  10-40 mL InterCATHeter Q8H    sodium chloride (NS) flush 20 mL  20 mL InterCATHeter Q24H    alteplase (CATHFLO) 1 mg in sterile water (preservative free) 1 mL injection  1 mg InterCATHeter PRN    anastrozole (ARIMIDEX) tablet 1 mg  1 mg Oral DAILY    0.9% sodium chloride infusion 250 mL  250 mL IntraVENous PRN    epoetin celio (EPOGEN;PROCRIT) injection 10,000 Units  10,000 Units SubCUTAneous Q MON, WED & FRI    albuterol-ipratropium (DUO-NEB) 2.5 MG-0.5 MG/3 ML  3 mL Nebulization Q6H PRN    glucose chewable tablet 16 g  4 Tab Oral PRN    dextrose (D50W) injection syrg 12.5-25 g  12.5-25 g IntraVENous PRN    glucagon (GLUCAGEN) injection 1 mg  1 mg IntraMUSCular PRN    insulin lispro (HUMALOG) injection   SubCUTAneous AC&HS    prochlorperazine (COMPAZINE) with saline injection 5 mg  5 mg IntraVENous Q8H PRN    ascorbic acid (vitamin C) (VITAMIN C) tablet 500 mg  500 mg Oral DAILY    cholecalciferol (VITAMIN D3) tablet 1,000 Units  1,000 Units Oral DAILY    folic acid (FOLVITE) tablet 1 mg  1 mg Oral DAILY    magnesium oxide (MAG-OX) tablet 400 mg  400 mg Oral DAILY    therapeutic multivitamin (THERAGRAN) tablet 1 Tab  1 Tab Oral DAILY    fish oil-omega-3 fatty acids 340-1,000 mg capsule 1 Cap  1 Cap Oral DAILY    sodium chloride (NS) flush 5-10 mL  5-10 mL IntraVENous Q8H    sodium chloride (NS) flush 5-10 mL  5-10 mL IntraVENous PRN    ondansetron (ZOFRAN) injection 4 mg  4 mg IntraVENous Q4H PRN    levothyroxine (SYNTHROID) tablet 88 mcg  88 mcg Oral ACB          LAB AND IMAGING FINDINGS:     Lab Results   Component Value Date/Time    WBC 15.5 (H) 04/26/2018 03:48 AM    HGB 6.7 (L) 04/26/2018 03:48 AM    PLATELET 244 82/09/3205 03:48 AM     Lab Results   Component Value Date/Time    Sodium 144 04/26/2018 03:48 AM    Potassium 3.3 (L) 04/26/2018 03:48 AM    Chloride 108 04/26/2018 03:48 AM    CO2 27 04/26/2018 03:48 AM    BUN 63 (H) 04/26/2018 03:48 AM    Creatinine 1.83 (H) 04/26/2018 03:48 AM    Calcium 9.3 04/26/2018 03:48 AM    Magnesium 1.5 (L) 04/26/2018 03:48 AM    Phosphorus 3.5 04/26/2018 03:48 AM      Lab Results   Component Value Date/Time    AST (SGOT) 39 (H) 04/24/2018 03:19 AM    Alk.  phosphatase 304 (H) 04/24/2018 03:19 AM    Protein, total 7.8 04/24/2018 03:19 AM    Albumin 2.9 (L) 04/24/2018 03:19 AM    Globulin 4.9 (H) 04/24/2018 03:19 AM     Lab Results   Component Value Date/Time    aPTT 32.1 (H) 04/22/2018 06:39 AM      Lab Results   Component Value Date/Time    Iron 12 (L) 04/19/2018 06:09 PM    TIBC 232 (L) 04/19/2018 06:09 PM    Iron % saturation 5 (L) 04/19/2018 06:09 PM    Ferritin 424 (H) 04/19/2018 06:09 PM      No results found for: PH, PCO2, PO2  No components found for: [place]   Lab Results   Component Value Date/Time    CK 41 04/22/2018 07:35 AM    CK - MB 1.2 04/22/2018 07:35 AM                Total time: 15 min   Counseling / coordination time, spent as noted above: 10 min   > 50% counseling / coordination?: yes    Prolonged service was provided for  []30 min   []75 min in face to face time in the presence of the patient, spent as noted above. Time Start:   Time End:   Note: this can only be billed with 20818 (initial) or 90771 (follow up). If multiple start / stop times, list each separately.

## 2018-04-26 NOTE — PROGRESS NOTES
Day #1 of fluconazole  Indication:  thrush  Current regimen:  200 mg IV q24 hrs    Recent Labs      18   0348  18   1842  18   0453  18   0319   WBC  15.5*   --   20.2*  20.8*   CREA  1.83*  1.83*  1.86*  1.86*  1.79*   BUN  63*  60*  48*  50*  53*     Est CrCl: 20 ml/min  Temp (24hrs), Av.5 °F (36.9 °C), Min:98 °F (36.7 °C), Max:98.9 °F (37.2 °C)    Plan: Changed dose to 200 mg 1x today, then 100 mg IV daily for crcl < 50.

## 2018-04-26 NOTE — PROGRESS NOTES
0745 received verbal bedside report from [address]- alarms checked  0800 son in and updated -pt npo for bronchoscopy- meds held for this  - pt's work of breathing is increased  0900 bronchoscopy done - pt developed bronospasm post procedure - resp. Treatment done still wheezing- pt to be placed on bipap - oral meds still on hold for lethargy post precedex for procedure- echo done - colonscopy on hold for now  1000 pt resting comfortably at present on bipap- son updated Interdisciplinary team rounds were held 4/26/2018 with the following team members:Care Management, Nursing, Pharmacy, Physician, Quality and Respiratory Therapy and the patient. Plan of care discussed. See clinical pathway and/or care plan for interventions and desired outcomes. 1100 talked with MRI staff- if off bipap by 2pm we can go for MRI at 3 pm -if not off we will shoot for 6 pm  1300 pt receiving blood- doing much better with the bipap more alert  1600 pt off bipap and on nc doing well -pt given po fluids- blood completed-  1745 pt to MRI with o2 and monitor with ICU rn  1900 back from MRI- reconnected to monitor- family in to visit   1900 shift summary: MRI done -1 unit of rbc given - bronchoscopy done - colonoscopy on hold - pt eating some this pm - was on bipap today from 9 am to 1400 post bronch due to wheezing and bronchospasm-  Bedside and Verbal shift change report given to [employer] (oncoming nurse) by Ruy Myers (offgoing nurse). Report included the following information SBAR, MAR and Cardiac Rhythm nsr.

## 2018-04-26 NOTE — PROGRESS NOTES
Hematology-Oncology Progress Note    Idalia Cabrera  1935  [id number]  4/26/2018    Follow-up for: abnormal brain MRI     [x]        Chart notes since last visit reviewed   [x]        Medications reviewed for allergies and interactions       Case discussed with the following:         []                            [x]        Nursing Staff                                                                         []        Pathologist                                                                        [x]        FAMILY      Subjective:     Spoke with patient who complains of: pt had a rough night again, is s/p bronchoscopy for mucus plugging this am, still wheezing    Objective:     Patient Vitals for the past 24 hrs:   BP Temp Pulse Resp SpO2 Height Weight   04/26/18 1049 119/51 - 79 - - - -   04/26/18 0946 155/66 - 84 - - - -   04/26/18 0930 148/71 - 90 27 100 % - -   04/26/18 0915 154/62 - 100 27 100 % - -   04/26/18 0910 157/69 - 98 26 100 % - -   04/26/18 0905 (!) 158/96 - 91 23 100 % - -   04/26/18 0900 (!) 155/96 - 97 29 100 % - -   04/26/18 0830 (!) 157/92 - 94 27 100 % - -   04/26/18 0800 156/59 98.5 °F (36.9 °C) 92 24 99 % - -   04/26/18 0700 156/78 - 94 26 100 % - -   04/26/18 0600 144/72 - 92 23 100 % - -   04/26/18 0500 145/55 - 82 17 100 % - -   04/26/18 0400 152/65 98.7 °F (37.1 °C) 90 23 100 % - -   04/26/18 0300 131/48 - 85 18 100 % - -   04/26/18 0200 135/68 - 86 18 100 % - -   04/26/18 0100 130/53 - 78 17 100 % - -   04/26/18 0000 129/51 98.4 °F (36.9 °C) 77 15 100 % - -   04/25/18 2300 113/53 - 76 15 100 % - -   04/25/18 2200 (!) 109/32 - 73 17 100 % - -   04/25/18 2100 113/53 - 77 20 100 % - -   04/25/18 2025 113/48 - 78 - - - -   04/25/18 2000 114/48 98.7 °F (37.1 °C) 75 16 100 % - -   04/25/18 1900 108/48 - 76 15 100 % - -   04/25/18 1845 114/45 - 71 14 100 % - -   04/25/18 1830 105/47 - 75 15 100 % - -   04/25/18 1815 102/47 - 76 14 100 % - -   04/25/18 1800 114/65 - 73 10 100 % - -   04/25/18 1745 115/58 - 75 17 100 % - -   04/25/18 1730 104/51 - 74 15 100 % - -   04/25/18 1700 108/51 - 75 16 100 % - -   04/25/18 1630 - - 76 18 100 % - -   04/25/18 1600 108/65 98.9 °F (37.2 °C) 75 15 100 % - -   04/25/18 1530 117/63 - 79 19 100 % - -   04/25/18 1527 - - - - 100 % - -   04/25/18 1525 - - - - - 5' 1\" (1.549 m) 60.4 kg (133 lb 2.5 oz)   04/25/18 1500 116/59 - 78 21 100 % - -   04/25/18 1430 116/62 - 77 20 100 % - -   04/25/18 1405 - - - - 100 % - -   04/25/18 1400 113/58 - 75 17 100 % - -   04/25/18 1330 109/59 - 72 18 100 % - -   04/25/18 1300 115/59 - 77 18 100 % - -   04/25/18 1230 116/56 - 76 18 100 % - -   04/25/18 1200 121/60 98 °F (36.7 °C) 76 21 100 % - -   04/25/18 1130 121/59 - 78 20 100 % - -       REVIEW OF SYSTEMS:    Constitutional: negative fever, negative chills, negative weight loss  Eyes:   negative visual changes  ENT:   negative sore throat, tongue or lip swelling  Respiratory:  negative cough, negative dyspnea  Cards:  negative for chest pain, palpitations, lower extremity edema  GI:   negative for nausea, vomiting, diarrhea, and abdominal pain  Neuro:  negative for headaches, dizziness, vertigo  []                        Full ROS o/w normal/non contributor    Constitutional:  Patient looks  [x]        Sick  []        Frail  []        Better                                                 []        Depressed    HEENT:  [x]   NC                         []   AT               []    ALOPECIA           Eyes: [x]   Normal               []    Icteric  Oropharynx: []    Normal                  []  Thrush               []   Dry  Mucositis: []    None                 Grade: []        I  []        II  []        III  []        IV  Neck:   [x]   Supple                  []  Rigid      Breast.. Left upper outer quadrant mass            Lungs clear anteriorly  Cor tachy  Abd.  Soft non tender  Ext, no edema  Skin:  Intact [x]           Purpura []        Rash: [x]   ABSENT       [] PRESENT  Neuro:  []        Normal  [x]        Confused lethargic    Available labs reviewed:  Labs:    Recent Results (from the past 24 hour(s))   GLUCOSE, POC    Collection Time: 04/25/18 11:40 AM   Result Value Ref Range    Glucose (POC) 223 (H) 65 - 100 mg/dL    Performed by Radhames Arroyo    GLUCOSE, POC    Collection Time: 04/25/18  6:11 PM   Result Value Ref Range    Glucose (POC) 144 (H) 65 - 100 mg/dL    Performed by Radhames Arroyo    METABOLIC PANEL, BASIC    Collection Time: 04/25/18  6:42 PM   Result Value Ref Range    Sodium 144 136 - 145 mmol/L    Potassium 3.4 (L) 3.5 - 5.1 mmol/L    Chloride 108 97 - 108 mmol/L    CO2 29 21 - 32 mmol/L    Anion gap 7 5 - 15 mmol/L    Glucose 123 (H) 65 - 100 mg/dL    BUN 60 (H) 6 - 20 MG/DL    Creatinine 1.83 (H) 0.55 - 1.02 MG/DL    BUN/Creatinine ratio 33 (H) 12 - 20      GFR est AA 32 (L) >60 ml/min/1.73m2    GFR est non-AA 26 (L) >60 ml/min/1.73m2    Calcium 9.1 8.5 - 10.1 MG/DL   GLUCOSE, POC    Collection Time: 04/25/18 11:20 PM   Result Value Ref Range    Glucose (POC) 100 65 - 100 mg/dL    Performed by Lucien Merlin    METABOLIC PANEL, BASIC    Collection Time: 04/26/18  3:48 AM   Result Value Ref Range    Sodium 144 136 - 145 mmol/L    Potassium 3.3 (L) 3.5 - 5.1 mmol/L    Chloride 108 97 - 108 mmol/L    CO2 27 21 - 32 mmol/L    Anion gap 9 5 - 15 mmol/L    Glucose 112 (H) 65 - 100 mg/dL    BUN 63 (H) 6 - 20 MG/DL    Creatinine 1.83 (H) 0.55 - 1.02 MG/DL    BUN/Creatinine ratio 34 (H) 12 - 20      GFR est AA 32 (L) >60 ml/min/1.73m2    GFR est non-AA 26 (L) >60 ml/min/1.73m2    Calcium 9.3 8.5 - 10.1 MG/DL   CBC WITH AUTOMATED DIFF    Collection Time: 04/26/18  3:48 AM   Result Value Ref Range    WBC 15.5 (H) 3.6 - 11.0 K/uL    RBC 2.34 (L) 3.80 - 5.20 M/uL    HGB 6.7 (L) 11.5 - 16.0 g/dL    HCT 21.4 (L) 35.0 - 47.0 %    MCV 91.5 80.0 - 99.0 FL    MCH 28.6 26.0 - 34.0 PG    MCHC 31.3 30.0 - 36.5 g/dL    RDW 24.2 (H) 11.5 - 14.5 %    PLATELET 530 377 - 414 K/uL    MPV 9.8 8.9 - 12.9 FL    NRBC 1.5 (H) 0  WBC    ABSOLUTE NRBC 0.24 (H) 0.00 - 0.01 K/uL    NEUTROPHILS 76 (H) 32 - 75 %    LYMPHOCYTES 10 (L) 12 - 49 %    MONOCYTES 12 5 - 13 %    EOSINOPHILS 0 0 - 7 %    BASOPHILS 0 0 - 1 %    IMMATURE GRANULOCYTES 2 (H) 0.0 - 0.5 %    ABS. NEUTROPHILS 11.7 (H) 1.8 - 8.0 K/UL    ABS. LYMPHOCYTES 1.6 0.8 - 3.5 K/UL    ABS. MONOCYTES 1.9 (H) 0.0 - 1.0 K/UL    ABS. EOSINOPHILS 0.0 0.0 - 0.4 K/UL    ABS. BASOPHILS 0.0 0.0 - 0.1 K/UL    ABS. IMM. GRANS. 0.3 (H) 0.00 - 0.04 K/UL    DF SMEAR SCANNED      PLATELET COMMENTS Large Platelets      RBC COMMENTS ANISOCYTOSIS  2+        RBC COMMENTS OVALOCYTES  PRESENT        RBC COMMENTS POLYCHROMASIA  PRESENT        RBC COMMENTS HYPOCHROMIA  1+        RBC COMMENTS SCHISTOCYTES  PRESENT        RBC COMMENTS SPHEROCYTES  PRESENT       PHOSPHORUS    Collection Time: 04/26/18  3:48 AM   Result Value Ref Range    Phosphorus 3.5 2.6 - 4.7 MG/DL   MAGNESIUM    Collection Time: 04/26/18  3:48 AM   Result Value Ref Range    Magnesium 1.5 (L) 1.6 - 2.4 mg/dL   GLUCOSE, POC    Collection Time: 04/26/18  6:55 AM   Result Value Ref Range    Glucose (POC) 123 (H) 65 - 100 mg/dL    Performed by Jane Hair    CULTURE, RESPIRATORY/SPUTUM/BRONCH [place] STAIN    Collection Time: 04/26/18  9:10 AM   Result Value Ref Range    Special Requests: NO SPECIAL REQUESTS      GRAM STAIN 2+ WBCS SEEN      GRAM STAIN 2+ BUDDING YEAST      GRAM STAIN 2+ EPITHELIAL CELLS SEEN      Culture result: PENDING        Available Xrays reviewed:    Chemotherapy monitored and toxicities assessed:    Assessment and Plan   1. Probable metastatic breast cancer.  The bone scan shows diffuse mets, the [id number] anc cea are elevated and the pt has a palpable left breast mass,,,she had a biopsy done in Ultrasound yesterday,,,suspect it will be ER + given the duration of mass in left breast ,   The biopsy did not have sufficient material for a diagnosis,,, D/w Dr. Virginia Valles today, she asked path to send whatever material they had for ER/ME status, will continue emperic Arimidex (hormonal AI therapy)    2. Anemia. .. Pt has bone mets, suspect this is due to chronic disease +- marrow involvement,  started procrit weekly on 4/23,,, given one unit prbc 4/23 as well, count down to 6.7  today , I will order one unit      3. CHF. Naoma Broccoli Per cardiology. .     4. AMS. . Improved this am,,,,She has no evidence of brain mets on MRI ,but does have calvarial mets    5.  Hx of colon cancer, apparently a colonoscopy was scheduled for this am      Lizabeth Jeffrey MD

## 2018-04-27 ENCOUNTER — APPOINTMENT (OUTPATIENT)
Dept: GENERAL RADIOLOGY | Age: 83
DRG: 853 | End: 2018-04-27
Attending: PHYSICIAN ASSISTANT
Payer: MEDICARE

## 2018-04-27 ENCOUNTER — APPOINTMENT (OUTPATIENT)
Dept: GENERAL RADIOLOGY | Age: 83
DRG: 853 | End: 2018-04-27
Attending: INTERNAL MEDICINE
Payer: MEDICARE

## 2018-04-27 LAB
ABO + RH BLD: NORMAL
ALBUMIN SERPL-MCNC: 2.7 G/DL (ref 3.5–5)
ANION GAP SERPL CALC-SCNC: 10 MMOL/L (ref 5–15)
ARTERIAL PATENCY WRIST A: YES
BACTERIA SPEC CULT: ABNORMAL
BACTERIA SPEC CULT: ABNORMAL
BACTERIA SPEC CULT: NORMAL
BASE EXCESS BLD CALC-SCNC: 14 MMOL/L
BASOPHILS # BLD: 0 K/UL (ref 0–0.1)
BASOPHILS NFR BLD: 0 % (ref 0–1)
BDY SITE: ABNORMAL
BLD PROD TYP BPU: NORMAL
BLD PROD TYP BPU: NORMAL
BLOOD GROUP ANTIBODIES SERPL: NORMAL
BPU ID: NORMAL
BPU ID: NORMAL
BUN SERPL-MCNC: 51 MG/DL (ref 6–20)
BUN/CREAT SERPL: 36 (ref 12–20)
CALCIUM SERPL-MCNC: 9.3 MG/DL (ref 8.5–10.1)
CHLORIDE SERPL-SCNC: 102 MMOL/L (ref 97–108)
CO2 SERPL-SCNC: 32 MMOL/L (ref 21–32)
CREAT SERPL-MCNC: 1.42 MG/DL (ref 0.55–1.02)
CROSSMATCH RESULT,%XM: NORMAL
CROSSMATCH RESULT,%XM: NORMAL
DIFFERENTIAL METHOD BLD: ABNORMAL
EOSINOPHIL # BLD: 0 K/UL (ref 0–0.4)
EOSINOPHIL NFR BLD: 0 % (ref 0–7)
ERYTHROCYTE [DISTWIDTH] IN BLOOD BY AUTOMATED COUNT: 23 % (ref 11.5–14.5)
GAS FLOW.O2 O2 DELIVERY SYS: ABNORMAL L/MIN
GLUCOSE BLD STRIP.AUTO-MCNC: 118 MG/DL (ref 65–100)
GLUCOSE BLD STRIP.AUTO-MCNC: 123 MG/DL (ref 65–100)
GLUCOSE BLD STRIP.AUTO-MCNC: 150 MG/DL (ref 65–100)
GLUCOSE BLD STRIP.AUTO-MCNC: 168 MG/DL (ref 65–100)
GLUCOSE SERPL-MCNC: 153 MG/DL (ref 65–100)
GRAM STN SPEC: ABNORMAL
GRAM STN SPEC: ABNORMAL
HCO3 BLD-SCNC: 38 MMOL/L (ref 22–26)
HCT VFR BLD AUTO: 32.2 % (ref 35–47)
HGB BLD-MCNC: 10.3 G/DL (ref 11.5–16)
IMM GRANULOCYTES # BLD: 0.4 K/UL (ref 0–0.04)
IMM GRANULOCYTES NFR BLD AUTO: 2 % (ref 0–0.5)
LYMPHOCYTES # BLD: 1.1 K/UL (ref 0.8–3.5)
LYMPHOCYTES NFR BLD: 6 % (ref 12–49)
MAGNESIUM SERPL-MCNC: 1.9 MG/DL (ref 1.6–2.4)
MCH RBC QN AUTO: 29.3 PG (ref 26–34)
MCHC RBC AUTO-ENTMCNC: 32 G/DL (ref 30–36.5)
MCV RBC AUTO: 91.5 FL (ref 80–99)
MONOCYTES # BLD: 1.7 K/UL (ref 0–1)
MONOCYTES NFR BLD: 9 % (ref 5–13)
NEUTS SEG # BLD: 15.6 K/UL (ref 1.8–8)
NEUTS SEG NFR BLD: 83 % (ref 32–75)
NRBC # BLD: 0.29 K/UL (ref 0–0.01)
NRBC BLD-RTO: 1.5 PER 100 WBC
O2/TOTAL GAS SETTING VFR VENT: 30 %
PCO2 BLD: 56.7 MMHG (ref 35–45)
PEEP RESPIRATORY: 6 CMH2O
PH BLD: 7.43 [PH] (ref 7.35–7.45)
PHOSPHATE SERPL-MCNC: 3 MG/DL (ref 2.6–4.7)
PIP ISTAT,IPIP: 14
PLATELET # BLD AUTO: 284 K/UL (ref 150–400)
PMV BLD AUTO: 9.7 FL (ref 8.9–12.9)
PO2 BLD: 111 MMHG (ref 80–100)
POTASSIUM SERPL-SCNC: 2.5 MMOL/L (ref 3.5–5.1)
RBC # BLD AUTO: 3.52 M/UL (ref 3.8–5.2)
RBC MORPH BLD: ABNORMAL
SAO2 % BLD: 98 % (ref 92–97)
SERVICE CMNT-IMP: ABNORMAL
SERVICE CMNT-IMP: NORMAL
SODIUM SERPL-SCNC: 144 MMOL/L (ref 136–145)
SPECIMEN EXP DATE BLD: NORMAL
SPECIMEN TYPE: ABNORMAL
STATUS OF UNIT,%ST: NORMAL
STATUS OF UNIT,%ST: NORMAL
TOTAL RESP. RATE, ITRR: 20
UNIT DIVISION, %UDIV: 0
UNIT DIVISION, %UDIV: 0
WBC # BLD AUTO: 18.8 K/UL (ref 3.6–11)

## 2018-04-27 PROCEDURE — 31720 CLEARANCE OF AIRWAYS: CPT

## 2018-04-27 PROCEDURE — 83735 ASSAY OF MAGNESIUM: CPT | Performed by: PHYSICIAN ASSISTANT

## 2018-04-27 PROCEDURE — 82803 BLOOD GASES ANY COMBINATION: CPT

## 2018-04-27 PROCEDURE — 74011250636 HC RX REV CODE- 250/636: Performed by: PHYSICIAN ASSISTANT

## 2018-04-27 PROCEDURE — 36415 COLL VENOUS BLD VENIPUNCTURE: CPT | Performed by: INTERNAL MEDICINE

## 2018-04-27 PROCEDURE — 36600 WITHDRAWAL OF ARTERIAL BLOOD: CPT

## 2018-04-27 PROCEDURE — 80069 RENAL FUNCTION PANEL: CPT | Performed by: INTERNAL MEDICINE

## 2018-04-27 PROCEDURE — 74011250636 HC RX REV CODE- 250/636: Performed by: INTERNAL MEDICINE

## 2018-04-27 PROCEDURE — 74011250637 HC RX REV CODE- 250/637: Performed by: HOSPITALIST

## 2018-04-27 PROCEDURE — 94669 MECHANICAL CHEST WALL OSCILL: CPT

## 2018-04-27 PROCEDURE — 74011000250 HC RX REV CODE- 250: Performed by: INTERNAL MEDICINE

## 2018-04-27 PROCEDURE — 74011636637 HC RX REV CODE- 636/637: Performed by: HOSPITALIST

## 2018-04-27 PROCEDURE — 94640 AIRWAY INHALATION TREATMENT: CPT

## 2018-04-27 PROCEDURE — 65610000006 HC RM INTENSIVE CARE

## 2018-04-27 PROCEDURE — 74011250636 HC RX REV CODE- 250/636: Performed by: NURSE PRACTITIONER

## 2018-04-27 PROCEDURE — 71045 X-RAY EXAM CHEST 1 VIEW: CPT

## 2018-04-27 PROCEDURE — 85025 COMPLETE CBC W/AUTO DIFF WBC: CPT | Performed by: INTERNAL MEDICINE

## 2018-04-27 PROCEDURE — 82962 GLUCOSE BLOOD TEST: CPT

## 2018-04-27 PROCEDURE — 74011000250 HC RX REV CODE- 250: Performed by: NURSE PRACTITIONER

## 2018-04-27 PROCEDURE — 94660 CPAP INITIATION&MGMT: CPT

## 2018-04-27 PROCEDURE — 74011000258 HC RX REV CODE- 258: Performed by: INTERNAL MEDICINE

## 2018-04-27 RX ORDER — LEVOFLOXACIN 5 MG/ML
750 INJECTION, SOLUTION INTRAVENOUS
Status: DISCONTINUED | OUTPATIENT
Start: 2018-04-28 | End: 2018-05-04 | Stop reason: CLARIF

## 2018-04-27 RX ORDER — MAGNESIUM SULFATE 1 G/100ML
1 INJECTION INTRAVENOUS ONCE
Status: COMPLETED | OUTPATIENT
Start: 2018-04-27 | End: 2018-04-28

## 2018-04-27 RX ORDER — LORAZEPAM 2 MG/ML
0.5 INJECTION INTRAMUSCULAR ONCE
Status: COMPLETED | OUTPATIENT
Start: 2018-04-27 | End: 2018-04-27

## 2018-04-27 RX ORDER — POTASSIUM CHLORIDE 29.8 MG/ML
20 INJECTION INTRAVENOUS
Status: DISCONTINUED | OUTPATIENT
Start: 2018-04-27 | End: 2018-04-27

## 2018-04-27 RX ORDER — METOPROLOL TARTRATE 5 MG/5ML
5 INJECTION INTRAVENOUS EVERY 4 HOURS
Status: DISCONTINUED | OUTPATIENT
Start: 2018-04-27 | End: 2018-04-27

## 2018-04-27 RX ORDER — POTASSIUM CHLORIDE 29.8 MG/ML
20 INJECTION INTRAVENOUS ONCE
Status: COMPLETED | OUTPATIENT
Start: 2018-04-27 | End: 2018-04-28

## 2018-04-27 RX ORDER — CARVEDILOL 12.5 MG/1
12.5 TABLET ORAL 2 TIMES DAILY
Status: DISCONTINUED | OUTPATIENT
Start: 2018-04-27 | End: 2018-04-27

## 2018-04-27 RX ORDER — POTASSIUM CHLORIDE 750 MG/1
20 TABLET, FILM COATED, EXTENDED RELEASE ORAL ONCE
Status: ACTIVE | OUTPATIENT
Start: 2018-04-27 | End: 2018-04-27

## 2018-04-27 RX ORDER — METOPROLOL TARTRATE 5 MG/5ML
5 INJECTION INTRAVENOUS EVERY 6 HOURS
Status: DISCONTINUED | OUTPATIENT
Start: 2018-04-28 | End: 2018-04-30

## 2018-04-27 RX ORDER — BUMETANIDE 0.25 MG/ML
1 INJECTION INTRAMUSCULAR; INTRAVENOUS EVERY 12 HOURS
Status: DISCONTINUED | OUTPATIENT
Start: 2018-04-27 | End: 2018-04-28

## 2018-04-27 RX ORDER — HYDRALAZINE HYDROCHLORIDE 25 MG/1
25 TABLET, FILM COATED ORAL 3 TIMES DAILY
Status: DISCONTINUED | OUTPATIENT
Start: 2018-04-27 | End: 2018-05-04

## 2018-04-27 RX ORDER — METOPROLOL TARTRATE 5 MG/5ML
5 INJECTION INTRAVENOUS EVERY 6 HOURS
Status: DISCONTINUED | OUTPATIENT
Start: 2018-04-27 | End: 2018-04-27

## 2018-04-27 RX ORDER — POTASSIUM CHLORIDE 29.8 MG/ML
20 INJECTION INTRAVENOUS
Status: COMPLETED | OUTPATIENT
Start: 2018-04-27 | End: 2018-04-28

## 2018-04-27 RX ADMIN — Medication 10 ML: at 22:29

## 2018-04-27 RX ADMIN — METOPROLOL TARTRATE 5 MG: 5 INJECTION, SOLUTION INTRAVENOUS at 12:39

## 2018-04-27 RX ADMIN — ACETYLCYSTEINE 200 MG: 200 SOLUTION ORAL; RESPIRATORY (INHALATION) at 19:28

## 2018-04-27 RX ADMIN — IPRATROPIUM BROMIDE AND ALBUTEROL SULFATE 3 ML: .5; 3 SOLUTION RESPIRATORY (INHALATION) at 04:10

## 2018-04-27 RX ADMIN — IPRATROPIUM BROMIDE AND ALBUTEROL SULFATE 3 ML: .5; 3 SOLUTION RESPIRATORY (INHALATION) at 11:54

## 2018-04-27 RX ADMIN — Medication 20 ML: at 22:29

## 2018-04-27 RX ADMIN — INSULIN LISPRO 2 UNITS: 100 INJECTION, SOLUTION INTRAVENOUS; SUBCUTANEOUS at 12:48

## 2018-04-27 RX ADMIN — MEROPENEM 500 MG: 500 INJECTION, POWDER, FOR SOLUTION INTRAVENOUS at 12:41

## 2018-04-27 RX ADMIN — IPRATROPIUM BROMIDE AND ALBUTEROL SULFATE 3 ML: .5; 3 SOLUTION RESPIRATORY (INHALATION) at 14:07

## 2018-04-27 RX ADMIN — Medication 20 ML: at 10:00

## 2018-04-27 RX ADMIN — Medication 10 ML: at 14:00

## 2018-04-27 RX ADMIN — Medication 10 ML: at 07:11

## 2018-04-27 RX ADMIN — POTASSIUM CHLORIDE 20 MEQ: 400 INJECTION, SOLUTION INTRAVENOUS at 14:11

## 2018-04-27 RX ADMIN — ACETYLCYSTEINE 200 MG: 200 SOLUTION ORAL; RESPIRATORY (INHALATION) at 07:52

## 2018-04-27 RX ADMIN — Medication 10 ML: at 10:00

## 2018-04-27 RX ADMIN — IPRATROPIUM BROMIDE AND ALBUTEROL SULFATE 3 ML: .5; 3 SOLUTION RESPIRATORY (INHALATION) at 23:11

## 2018-04-27 RX ADMIN — HYDRALAZINE HYDROCHLORIDE 10 MG: 20 INJECTION INTRAMUSCULAR; INTRAVENOUS at 14:37

## 2018-04-27 RX ADMIN — INSULIN LISPRO 2 UNITS: 100 INJECTION, SOLUTION INTRAVENOUS; SUBCUTANEOUS at 07:25

## 2018-04-27 RX ADMIN — WATER 250 MG: 1 INJECTION INTRAMUSCULAR; INTRAVENOUS; SUBCUTANEOUS at 13:58

## 2018-04-27 RX ADMIN — POTASSIUM CHLORIDE 20 MEQ: 400 INJECTION, SOLUTION INTRAVENOUS at 17:29

## 2018-04-27 RX ADMIN — IPRATROPIUM BROMIDE AND ALBUTEROL SULFATE 3 ML: .5; 3 SOLUTION RESPIRATORY (INHALATION) at 17:15

## 2018-04-27 RX ADMIN — POTASSIUM CHLORIDE 20 MEQ: 400 INJECTION, SOLUTION INTRAVENOUS at 16:39

## 2018-04-27 RX ADMIN — Medication 10 ML: at 22:30

## 2018-04-27 RX ADMIN — MEROPENEM 500 MG: 500 INJECTION, POWDER, FOR SOLUTION INTRAVENOUS at 00:26

## 2018-04-27 RX ADMIN — IPRATROPIUM BROMIDE AND ALBUTEROL SULFATE 3 ML: .5; 3 SOLUTION RESPIRATORY (INHALATION) at 19:27

## 2018-04-27 RX ADMIN — Medication 10 ML: at 17:33

## 2018-04-27 RX ADMIN — BUDESONIDE 500 MCG: 0.5 INHALANT RESPIRATORY (INHALATION) at 19:27

## 2018-04-27 RX ADMIN — EPOETIN ALFA 10000 UNITS: 10000 SOLUTION INTRAVENOUS; SUBCUTANEOUS at 14:02

## 2018-04-27 RX ADMIN — FLUCONAZOLE 100 MG: 2 INJECTION INTRAVENOUS at 12:49

## 2018-04-27 RX ADMIN — MAGNESIUM SULFATE HEPTAHYDRATE 1 G: 1 INJECTION, SOLUTION INTRAVENOUS at 10:07

## 2018-04-27 RX ADMIN — METOPROLOL TARTRATE 5 MG: 5 INJECTION, SOLUTION INTRAVENOUS at 16:40

## 2018-04-27 RX ADMIN — ACETYLCYSTEINE 200 MG: 200 SOLUTION ORAL; RESPIRATORY (INHALATION) at 17:13

## 2018-04-27 RX ADMIN — IPRATROPIUM BROMIDE AND ALBUTEROL SULFATE 3 ML: .5; 3 SOLUTION RESPIRATORY (INHALATION) at 07:51

## 2018-04-27 RX ADMIN — LEVOTHYROXINE SODIUM 88 MCG: 88 TABLET ORAL at 07:11

## 2018-04-27 RX ADMIN — BUDESONIDE 500 MCG: 0.5 INHALANT RESPIRATORY (INHALATION) at 07:51

## 2018-04-27 RX ADMIN — BUMETANIDE 2 MG: 0.25 INJECTION INTRAMUSCULAR; INTRAVENOUS at 09:02

## 2018-04-27 RX ADMIN — ACETYLCYSTEINE 200 MG: 200 SOLUTION ORAL; RESPIRATORY (INHALATION) at 11:55

## 2018-04-27 RX ADMIN — POTASSIUM CHLORIDE 20 MEQ: 400 INJECTION, SOLUTION INTRAVENOUS at 09:00

## 2018-04-27 RX ADMIN — LORAZEPAM 0.5 MG: 2 INJECTION INTRAMUSCULAR; INTRAVENOUS at 08:19

## 2018-04-27 RX ADMIN — BUMETANIDE 1 MG: 0.25 INJECTION INTRAMUSCULAR; INTRAVENOUS at 22:28

## 2018-04-27 NOTE — PROGRESS NOTES
[facility]   [address] [facility], [facility] [address], [facility]  Phone: [phone]   [phone]       Nephrology Progress Note  Flavio Carlin     1935     [id number]  Date of Admission : 4/16/2018 04/27/18    CC: Follow up for JEROD        Assessment and Plan   JEROD on CKD   - JEROD is multifactorial   - Labs pending, but UOP 3.5 L yesterday   - If Cr worse ==> reduce Bumex to 1mg BID   - Poor candidate for long term dialysis : will d/w son over the weekend     Left Lower Pole Renal Calculus :  - likely source of UTI and Pyelo like picture   - sepsis better now and urine had Pan sensitive E coli and Pseudomonas     Labile HTN:  - she has large calcified atherosclerotic lesion of Left renal artery ostium, CT was done without contrast to determine the severity of SONALI  - conservative management w/ meds  - increased Coreg and added oral hydralazine     Acute Resp Failure:  - bronch 4/25 and 4/26  - per PCCM     Sepsis  -off pressors     CKD Stage III :  - baseline Cr 0.9-1 mg/dl lately   - progressing, Cr around 1.5 to 1.7 here     NSTEMI :  - Echo shows EF 35-45%, mod LVH, Severe Hypokinesis of apex and moderate Hypokinesis of anteroseptal wall     Acute Systolic CHF: 2/2 MI   - avoid  ACE/ARB for now     Chronic Pontine Infarcts      Metastatic Breast Ca w/ Calvarial and Bone mets   - s/p breast Bx      - hx of colon Ca     Anemia :  - per heme/onc     Interval History:  Seen and examined. Off drips for BP. She remained on BIPAP overnight. UOP 3.5l , Labs are pending   She feels weak   Was transfused yesterday. Review of Systems: Review of systems not obtained due to patient factors.     Current Medications:   Current Facility-Administered Medications   Medication Dose Route Frequency    dexmedeTOMidine (PRECEDEX) 400 mcg in 0.9% sodium chloride 100 mL infusion  0.2-0.7 mcg/kg/hr IntraVENous TITRATE    sodium chloride (NS) flush 5-10 mL  5-10 mL IntraVENous Q8H    sodium chloride (NS) flush 5-10 mL  5-10 mL IntraVENous PRN    albuterol-ipratropium (DUO-NEB) 2.5 MG-0.5 MG/3 ML  3 mL Nebulization Q4H RT    budesonide (PULMICORT) 500 mcg/2 ml nebulizer suspension  500 mcg Nebulization BID RT    acetylcysteine (MUCOMYST) 200 mg/mL (20 %) solution 200 mg  200 mg Nebulization QID RT    hydrALAZINE (APRESOLINE) 20 mg/mL injection 10 mg  10 mg IntraVENous Q6H PRN    fluconazole in 0.9% NaCl 100 mg IVPB  100 mg IntraVENous Q24H    0.9% sodium chloride infusion 250 mL  250 mL IntraVENous PRN    acetaminophen (TYLENOL) tablet 650 mg  650 mg Oral Q4H PRN    carvedilol (COREG) tablet 6.25 mg  6.25 mg Oral BID    sodium chloride (NS) flush 20 mL  20 mL InterCATHeter PRN    sodium chloride (NS) flush 10 mL  10 mL InterCATHeter Q24H    sodium chloride (NS) flush 10 mL  10 mL InterCATHeter PRN    sodium chloride (NS) flush 10 mL  10 mL InterCATHeter Q8H    alteplase (CATHFLO) 1 mg in sterile water (preservative free) 1 mL injection  1 mg InterCATHeter PRN    bacitracin 500 unit/gram packet 1 Packet  1 Packet Topical PRN    meropenem (MERREM) 500 mg in 0.9% sodium chloride (MBP/ADV) 50 mL  0.5 g IntraVENous Q12H    levoFLOXacin (LEVAQUIN) 500 mg in D5W IVPB  500 mg IntraVENous Q48H    bumetanide (BUMEX) injection 2 mg  2 mg IntraVENous Q12H    sodium chloride (NS) flush 10-30 mL  10-30 mL InterCATHeter PRN    sodium chloride (NS) flush 10 mL  10 mL InterCATHeter Q24H    sodium chloride (NS) flush 10 mL  10 mL InterCATHeter PRN    sodium chloride (NS) flush 10-40 mL  10-40 mL InterCATHeter Q8H    sodium chloride (NS) flush 20 mL  20 mL InterCATHeter Q24H    alteplase (CATHFLO) 1 mg in sterile water (preservative free) 1 mL injection  1 mg InterCATHeter PRN    anastrozole (ARIMIDEX) tablet 1 mg  1 mg Oral DAILY    0.9% sodium chloride infusion 250 mL  250 mL IntraVENous PRN    epoetin celio (EPOGEN;PROCRIT) injection 10,000 Units  10,000 Units SubCUTAneous Q MON, WED & FRI    albuterol-ipratropium (DUO-NEB) 2.5 MG-0.5 MG/3 ML  3 mL Nebulization Q6H PRN    glucose chewable tablet 16 g  4 Tab Oral PRN    dextrose (D50W) injection syrg 12.5-25 g  12.5-25 g IntraVENous PRN    glucagon (GLUCAGEN) injection 1 mg  1 mg IntraMUSCular PRN    insulin lispro (HUMALOG) injection   SubCUTAneous AC&HS    prochlorperazine (COMPAZINE) with saline injection 5 mg  5 mg IntraVENous Q8H PRN    ascorbic acid (vitamin C) (VITAMIN C) tablet 500 mg  500 mg Oral DAILY    cholecalciferol (VITAMIN D3) tablet 1,000 Units  1,000 Units Oral DAILY    folic acid (FOLVITE) tablet 1 mg  1 mg Oral DAILY    magnesium oxide (MAG-OX) tablet 400 mg  400 mg Oral DAILY    therapeutic multivitamin (THERAGRAN) tablet 1 Tab  1 Tab Oral DAILY    fish oil-omega-3 fatty acids 340-1,000 mg capsule 1 Cap  1 Cap Oral DAILY    sodium chloride (NS) flush 5-10 mL  5-10 mL IntraVENous Q8H    sodium chloride (NS) flush 5-10 mL  5-10 mL IntraVENous PRN    ondansetron (ZOFRAN) injection 4 mg  4 mg IntraVENous Q4H PRN    levothyroxine (SYNTHROID) tablet 88 mcg  88 mcg Oral ACB      Allergies   Allergen Reactions    Statins-Hmg-Coa Reductase Inhibitors Other (comments)     Intolerant to statins     Sulfa (Sulfonamide Antibiotics) Other (comments)     syncope       Objective:  Vitals:    Vitals:    04/27/18 0300 04/27/18 0400 04/27/18 0410 04/27/18 0500   BP: 152/54 158/61  163/60   Pulse: 87 87  90   Resp: 19 19 18   Temp:       SpO2: 100% 100% 100% 100%   Weight:       Height:         Intake and Output:  04/26 1901 - 04/27 0700  In: 50 [I.V.:50]  Out: 1025 [Urine:1025]  04/25 0701 - 04/26 1900  In: 1081.5 [P.O.:140;  I.V.:641.1]  Out: 4239 [Urine:4239]    Physical Examination:    General: lethargic  Neck:  Supple, no mass  Resp:  Diminished at bases   CV:  RRR, no murmur  GI:  Soft, NT, + BS  Neurologic:  Non focal     []    High complexity decision making was performed  []    Patient is at high-risk of decompensation with multiple organ involvement    Lab Data Personally Reviewed: I have reviewed all the pertinent labs, microbiology data and radiology studies during assessment. Recent Labs      04/26/18 0348  04/25/18   1842  04/25/18   0453   NA  144  144  142   K  3.3*  3.4*  2.9*   CL  108  108  105   CO2  27  29  25   GLU  112*  123*  231*   BUN  63*  60*  48*   CREA  1.83*  1.83*  1.86*   CA  9.3  9.1  10.0   MG  1.5*   --   1.9   PHOS  3.5   --    --      Recent Labs      04/26/18 0348  04/25/18   0453   WBC  15.5*  20.2*   HGB  6.7*  7.8*   HCT  21.4*  24.9*   PLT  333  409*     No results found for: SDES  Lab Results   Component Value Date/Time    Culture result: PENDING 04/26/2018 09:10 AM    Culture result: MODERATE APPARENT CANDIDA ALBICANS (A) 04/25/2018 09:40 AM    Culture result: SO FAR 04/25/2018 09:40 AM    Culture result: NO GROWTH 1 DAY 04/22/2018 07:35 AM    Culture result: NO GROWTH 4 DAYS 04/22/2018 07:35 AM     Recent Results (from the past 24 hour(s))   CULTURE, RESPIRATORY/SPUTUM/BRONCH W GRAM STAIN    Collection Time: 04/26/18  9:10 AM   Result Value Ref Range    Special Requests: NO SPECIAL REQUESTS      GRAM STAIN 2+ WBCS SEEN      GRAM STAIN 2+ BUDDING YEAST      GRAM STAIN 2+ EPITHELIAL CELLS SEEN      Culture result: PENDING    GLUCOSE, POC    Collection Time: 04/26/18 12:45 PM   Result Value Ref Range    Glucose (POC) 141 (H) 65 - 100 mg/dL    Performed by [address], POC    Collection Time: 04/26/18  5:24 PM   Result Value Ref Range    Glucose (POC) 148 (H) 65 - 100 mg/dL    Performed by [employer], POC    Collection Time: 04/26/18  9:25 PM   Result Value Ref Range    Glucose (POC) 120 (H) 65 - 100 mg/dL    Performed by Chaya Dumont        I have reviewed the flowsheets. Chart and Pertinent Notes have been reviewed. No change in PMH ,family and social history from Consult note.       Aleksander Harris MD

## 2018-04-27 NOTE — PROGRESS NOTES
1930 Bedside and Verbal shift change report given to Afsaneh Collins RN (oncoming nurse) by July Hale RN (offgoing nurse). Report included the following information SBAR, Kardex, ED Summary, Procedure Summary, Intake/Output, MAR, Accordion, Recent Results, Cardiac Rhythm NSR and Alarm Parameters . 18 Pt has increased working of breathing and lungs sound congested, placed on BI PAP. 0730 Bedside and Verbal shift change report given to Mckinley Graf RN (oncoming nurse) by Afsaneh Collins RN (offgoing nurse). Report included the following information SBAR, Kardex, ED Summary, Procedure Summary, Intake/Output, MAR, Accordion, Recent Results, Cardiac Rhythm NSR/Sinus Tach and Alarm Parameters .

## 2018-04-27 NOTE — PALLIATIVE CARE
Palliative Medicine Social Work    Chart reviewed. Visited with patient and her son, Sae Ann, and friend at bedside. Patient minimally responsive, lethargic on NC. Son was anxious and frustrated at her condition. He feels helpless in his witness of her struggle. He feels like mornings are the worst and have been consistently for the last few days. He reports she \"does great\" in the afternoons; was able to engage; eat and enjoy visitors last night. He feels like her symptoms are not being proactively managed and that \"If we know she is going to bad in the morning, why not do something about it before it happens? \"  He sees her PNA and CHF as the only issues. He views her metastatic cancer as a \"non-issue\". He accepts that her cancer will eventually take her life, but feels the acute issues at hand can be treated and don't have to cause her death. He doesn't fully trust that ID or cardiology are doing everything they can to get her through this. He believes the ativan is masking symptoms and making it difficult for her to communicate. He is fearful that she will be transferred to another floor before she is ready; believes that her premature transfer before was partly to blame for her decline. Talked more about the reality of her tenuous state and the delicate balance of issues. He is able to acknowledge that much of his frustration stems from his sense of helplessness and fear of losing her. He accepts her infection and CHF are being treated and able to surmise that \"this just may be the way it is\" or \"maybe she just needs more time\". Engaged some in the what ifs and his goals if her condition does not improve; if she does not rebound in the afternoon. He communicates his desire is still to have her intubated in respiratory arrest if needed. Will continue to support and clarify goals as needed. Communicated this information to nursing, cardiology team and palliative IDT.     Thank you for the opportunity to be involved in the care of Ms. Ledesma and her family. Mariaelena Babcock, VAHE, [facility]-  Palliative Medicine   [employer] ® ACP Facilitator   [phone]

## 2018-04-27 NOTE — PROGRESS NOTES
Hematology-Oncology Progress Note    Jose Blum  1935  [id number]  4/27/2018    Follow-up for: met.  Breast cancer     [x]        Chart notes since last visit reviewed   [x]        Medications reviewed for allergies and interactions       Case discussed with the following:         []                            []        Nursing Staff                                                                         []        Pathologist                                                                        [x]        FAMILY      Subjective:     Spoke with patient who complains of: pt had a rough morning with increased sob , is sedated, seems comfortable    Objective:     Patient Vitals for the past 24 hrs:   BP Temp Pulse Resp SpO2 Weight   04/27/18 1000 158/74 - (!) 117 27 99 % -   04/27/18 0930 159/73 - (!) 123 30 98 % -   04/27/18 0915 - - (!) 120 28 99 % -   04/27/18 0902 157/80 - (!) 121 - - -   04/27/18 0900 157/80 - (!) 118 27 99 % -   04/27/18 0830 164/70 - (!) 109 25 100 % -   04/27/18 0815 - - (!) 107 26 100 % -   04/27/18 0800 168/75 98.9 °F (37.2 °C) (!) 108 27 100 % -   04/27/18 0752 - - - - 100 % -   04/27/18 0700 157/71 - (!) 108 28 95 % -   04/27/18 0600 164/63 - 95 20 100 % -   04/27/18 0500 163/60 - 90 18 100 % -   04/27/18 0410 - - - - 100 % -   04/27/18 0400 158/61 98.8 °F (37.1 °C) 87 19 100 % -   04/27/18 0300 152/54 - 87 19 100 % -   04/27/18 0200 154/71 - (!) 114 29 93 % -   04/27/18 0100 152/57 - 99 24 100 % -   04/27/18 0000 150/56 98.8 °F (37.1 °C) 97 23 100 % -   04/26/18 2351 - - - - 100 % -   04/26/18 2300 154/64 - (!) 103 29 99 % -   04/26/18 2200 149/52 - (!) 105 26 100 % -   04/26/18 2111 164/68 - 96 - - -   04/26/18 2100 164/68 - 98 26 100 % -   04/26/18 2004 - - - - 100 % -   04/26/18 2000 150/68 98.4 °F (36.9 °C) 90 23 100 % -   04/26/18 1900 158/67 - 98 20 100 % -   04/26/18 1800 163/65 - 97 21 100 % -   04/26/18 1730 157/71 - 93 24 100 % -   04/26/18 1726 - - - - 93 % - 04/26/18 1700 143/65 - 93 24 100 % -   04/26/18 1630 142/70 - 95 22 100 % -   04/26/18 1609 - - - - - 61.9 kg (136 lb 7.4 oz)   04/26/18 1600 148/62 98.4 °F (36.9 °C) 96 22 100 % -   04/26/18 1530 164/72 97.9 °F (36.6 °C) 94 24 100 % -   04/26/18 1500 155/71 97.8 °F (36.6 °C) 87 18 100 % -   04/26/18 1430 155/74 98 °F (36.7 °C) 90 22 100 % -   04/26/18 1406 162/67 - 88 - - -   04/26/18 1400 162/67 96.7 °F (35.9 °C) 87 20 100 % -   04/26/18 1330 149/70 96.9 °F (36.1 °C) 86 19 100 % -   04/26/18 1315 138/70 96.8 °F (36 °C) 82 18 100 % -   04/26/18 1300 144/65 96.9 °F (36.1 °C) 81 18 100 % -   04/26/18 1255 148/65 96.9 °F (36.1 °C) 85 17 100 % -   04/26/18 1245 148/65 96.9 °F (36.1 °C) 82 20 100 % -   04/26/18 1240 151/64 96.8 °F (36 °C) 85 19 98 % -   04/26/18 1230 151/66 98.6 °F (37 °C) 85 16 100 % -   04/26/18 1221 157/66 97.8 °F (36.6 °C) 83 18 100 % -   04/26/18 1215 - - 84 16 100 % -   04/26/18 1200 146/59 - 78 17 100 % -   04/26/18 1130 130/53 - 79 18 100 % -   04/26/18 1100 129/54 - 81 20 100 % -   04/26/18 1049 119/51 - 79 - - -   04/26/18 1030 121/50 - 82 19 100 % -       REVIEW OF SYSTEMS:    Constitutional: negative fever, negative chills, negative weight loss  Eyes:   negative visual changes  ENT:   negative sore throat, tongue or lip swelling  Respiratory:  negative cough, negative dyspnea  Cards:  negative for chest pain, palpitations, lower extremity edema  GI:   negative for nausea, vomiting, diarrhea, and abdominal pain  Neuro:  negative for headaches, dizziness, vertigo  []                        Full ROS o/w normal/non contributor    Constitutional:  Patient looks  [x]        Sick  []        Frail  []        Better                                                 []        Depressed    HEENT:  [x]   NC                         []   AT               []    ALOPECIA           Eyes: [x]   Normal               []    Icteric  Oropharynx: []    Normal                  []  Thrush               [] Dry  Mucositis: []    None                 Grade: []        I  []        II  []        III  []        IV  Neck:   [x]   Supple                  []  Rigid      Breast.. Left upper outer quadrant mass            Lungs bilat. wheezing  Cor tachy  Abd. Soft non tender  Ext, no edema  Skin:  Intact [x]           Purpura []        Rash: [x]   ABSENT       []  PRESENT  Neuro:  []        Normal  [x]        Confused lethargic    Available labs reviewed:  Labs:    Recent Results (from the past 24 hour(s))   GLUCOSE, POC    Collection Time: 04/26/18 12:45 PM   Result Value Ref Range    Glucose (POC) 141 (H) 65 - 100 mg/dL    Performed by [address], POC    Collection Time: 04/26/18  5:24 PM   Result Value Ref Range    Glucose (POC) 148 (H) 65 - 100 mg/dL    Performed by Marni Gill    GLUCOSE, POC    Collection Time: 04/26/18  9:25 PM   Result Value Ref Range    Glucose (POC) 120 (H) 65 - 100 mg/dL    Performed by [place] [employer]    CBC WITH AUTOMATED DIFF    Collection Time: 04/27/18  7:14 AM   Result Value Ref Range    WBC 18.8 (H) 3.6 - 11.0 K/uL    RBC 3.52 (L) 3.80 - 5.20 M/uL    HGB 10.3 (L) 11.5 - 16.0 g/dL    HCT 32.2 (L) 35.0 - 47.0 %    MCV 91.5 80.0 - 99.0 FL    MCH 29.3 26.0 - 34.0 PG    MCHC 32.0 30.0 - 36.5 g/dL    RDW 23.0 (H) 11.5 - 14.5 %    PLATELET 465 051 - 139 K/uL    MPV 9.7 8.9 - 12.9 FL    NRBC 1.5 (H) 0  WBC    ABSOLUTE NRBC 0.29 (H) 0.00 - 0.01 K/uL    NEUTROPHILS 83 (H) 32 - 75 %    LYMPHOCYTES 6 (L) 12 - 49 %    MONOCYTES 9 5 - 13 %    EOSINOPHILS 0 0 - 7 %    BASOPHILS 0 0 - 1 %    IMMATURE GRANULOCYTES 2 (H) 0.0 - 0.5 %    ABS. NEUTROPHILS 15.6 (H) 1.8 - 8.0 K/UL    ABS. LYMPHOCYTES 1.1 0.8 - 3.5 K/UL    ABS. MONOCYTES 1.7 (H) 0.0 - 1.0 K/UL    ABS. EOSINOPHILS 0.0 0.0 - 0.4 K/UL    ABS. BASOPHILS 0.0 0.0 - 0.1 K/UL    ABS. IMM.  GRANS. 0.4 (H) 0.00 - 0.04 K/UL    DF SMEAR SCANNED      RBC COMMENTS POLYCHROMASIA  1+        RBC COMMENTS ANISOCYTOSIS  3+        RBC COMMENTS MACROCYTOSIS  1+        RBC COMMENTS OVALOCYTES  PRESENT        RBC COMMENTS TARGET CELLS  PRESENT        RBC COMMENTS HYPOCHROMIA  2+        RBC COMMENTS TEARDROP CELLS  PRESENT       RENAL FUNCTION PANEL    Collection Time: 04/27/18  7:14 AM   Result Value Ref Range    Sodium 144 136 - 145 mmol/L    Potassium 2.5 (LL) 3.5 - 5.1 mmol/L    Chloride 102 97 - 108 mmol/L    CO2 32 21 - 32 mmol/L    Anion gap 10 5 - 15 mmol/L    Glucose 153 (H) 65 - 100 mg/dL    BUN 51 (H) 6 - 20 MG/DL    Creatinine 1.42 (H) 0.55 - 1.02 MG/DL    BUN/Creatinine ratio 36 (H) 12 - 20      GFR est AA 43 (L) >60 ml/min/1.73m2    GFR est non-AA 36 (L) >60 ml/min/1.73m2    Calcium 9.3 8.5 - 10.1 MG/DL    Phosphorus 3.0 2.6 - 4.7 MG/DL    Albumin 2.7 (L) 3.5 - 5.0 g/dL   MAGNESIUM    Collection Time: 04/27/18  7:14 AM   Result Value Ref Range    Magnesium 1.9 1.6 - 2.4 mg/dL   GLUCOSE, POC    Collection Time: 04/27/18  7:19 AM   Result Value Ref Range    Glucose (POC) 168 (H) 65 - 100 mg/dL    Performed by Robert Sharp        Available Xrays reviewed:    Chemotherapy monitored and toxicities assessed:    Assessment and Plan   1. Probable metastatic breast cancer. The bone scan shows diffuse mets, the [id number] anc cea are elevated and the pt has a palpable left breast mass,,,she had a biopsy done in Ultrasound yesterday,,,suspect it will be ER + given the duration of mass in left breast ,   The biopsy did not have sufficient material for a diagnosis,,, D/w Dr. Christopher Morgan today, she asked path to send whatever material they had for ER/NM status, will continue emperic Arimidex (hormonal AI therapy)    2. Anemia. .. Pt has bone mets, suspect this is due to chronic disease +- marrow involvement,  started procrit weekly on 4/23,,, given one unit prbc 4/23 as well, count up to 10.3 after blood given yesterday (4/26)       3. CHF. Michaelyn Daily Per cardiology. .     4. AMS. . ,,,She has no evidence of brain mets on MRI ,but does have calvarial mets          [facility] Oliva Osorio MD

## 2018-04-27 NOTE — PROGRESS NOTES
CPT performed via CPT Vest for 25 min. Nasal trumpet inserted without difficulty. Patient NT suctioned with little sputum return. Patient returned to 4L nasal cannula. Decreased breath sounds noted throughout left lung fields. RN notified.  Patient placed on HFNC per MD orders

## 2018-04-27 NOTE — PROGRESS NOTES
Day #4 of Levaquin  Indication:  Pneumonia / mucous plugging  Current regimen:  500 mg IV Q 48 hr  Abx regimen: Fluconazole + Levaquin + Meropenem  Recent Labs      18   0714  18   0348  18   1842  18   0453   WBC  18.8*  15.5*   --   20.2*   CREA  1.42*  1.83*  1.83*  1.86*   BUN  51*  63*  60*  48*     Est CrCl: ~25 ml/min; UO: >1 ml/kg/hr  Temp (24hrs), Av.8 °F (36.6 °C), Min:96.7 °F (35.9 °C), Max:98.9 °F (37.2 °C)    Cultures:    Blood: NG, final   Urine: >100k E.coli (R-Amp) + 1k Pseudomonas spp, final   Blood: NG, final   Urine: 25k GNRs (see above), final   Blood: NG, final   Urine: NG, final   BAL: moderate apparent C.albicans, final   BAL: pending    Plan: Given the patient's improving Scr, the dose of Levaquin has been adjusted to 750 mg IV Q 48 hr per [address] [place] [address] P&T Committee Protocol with respect to renal function. Pharmacy will continue to monitor patient daily and will make dosage adjustments based upon changing renal function.

## 2018-04-27 NOTE — PROGRESS NOTES
ID Progress Note  2018    Subjective:     She remains weak. She has needed bipap intermittently. She has no specific complaints. Objective:     Antibiotics:  1. Merrem  2. Levaquin  3. Fluconazole      Vitals:   Visit Vitals    /71    Pulse (!) 101    Temp 98.7 °F (37.1 °C)    Resp 26    Ht 5' 1\" (1.549 m)    Wt 61.9 kg (136 lb 7.4 oz)    SpO2 99%    BMI 25.78 kg/m2        Tmax:  Temp (24hrs), Av.4 °F (36.9 °C), Min:97.8 °F (36.6 °C), Max:98.9 °F (37.2 °C)      Exam:  Lungs:  rhonchi R anterior  Heart:  regular rate and rhythm  Abdomen:  soft, non-tender. Bowel sounds normal. No masses,  no organomegaly  Skin:  no rash or abnormalities    Labs:      Recent Labs      18   0714  18   0348  18   1842  18   0453   WBC  18.8*  15.5*   --   20.2*   HGB  10.3*  6.7*   --   7.8*   PLT  284  333   --   409*   BUN  51*  63*  60*  48*   CREA  1.42*  1.83*  1.83*  1.86*       Cultures:     No results found for: ANIYAH  Lab Results   Component Value Date/Time    Culture result: LIGHT APPARENT CANDIDA ALBICANS ONLY (A) 2018 09:10 AM    Culture result: SO FAR 2018 09:10 AM    Culture result: MODERATE APPARENT CANDIDA ALBICANS (A) 2018 09:40 AM    Culture result: RARE NORMAL RESPIRATORY BERYL 2018 09:40 AM       Radiology: X ray stable    Line/Insert Date:           Assessment:     1. Pneumonia  2. Metastatic cancer  3. Debility  4. Candida from sputum (uncertain significance)    Objective:     1. Continue current therapy  2. Follow up pending cultures and studies  3. Discussed with her son  4.  Group will check cultures over weekend--call if she needs to be seen specifically    Gilbert Multani MD

## 2018-04-27 NOTE — PROGRESS NOTES
Patient required Bipap overnight, now on NC, patient is lethargic this morning. Patient is s/p bronch from  yesterday for a mucus plug. Care management is continuing to follow.  Mario Jacob RN,CRM

## 2018-04-27 NOTE — PROGRESS NOTES
Pulmonary, Critical Care, and Sleep Medicine~Progress Note    Name: Prakash Rothman MRN: [id number]   : 1935 Hospital: [facility] [address]   Date: 2018 11:37 AM Admission: 2018      Impression Plan   1. Metastatic breast cancer- new dx- left breast mass on MRI with diffuse skeletal mets. Follows with Dr. Estefany Mo  2. Acute hypercarbic/hypoxic resp failure-pCO2 ok range  3. Altered mental status  4. NSTEMI - ECHO EF 40%  5. JEROD on CKD  6. GERD  7. E Coli UTI- sens pip/tazo 1. O2 titration above 90%  2. Hemodynamically stable  3. diuresis for CHF  4. BP control   5. Correct electrolytes   6. ABX per ID  7. Patient followed by multiple specialties   8. NIV at night and PRN during the day for now  9. Discussed with RN/attending. Can move to IMCU  10. Strong pulmonary toilet, including chest PT  11. PRN over the weekend     Daily Progression:    Some anxiety this morning, but was improving. Chest film looks much better this morning. No wob visible. I have reviewed the labs and previous days notes. Review of systems not obtained due to patient factors. OBJECTIVE:     Vital Signs:       Visit Vitals    /71    Pulse (!) 108    Temp 98.9 °F (37.2 °C)    Resp 28    Ht 5' 1\" (1.549 m)    Wt 61.9 kg (136 lb 7.4 oz)    SpO2 100%    BMI 25.78 kg/m2      Temp (24hrs), Av.7 °F (36.5 °C), Min:96.7 °F (35.9 °C), Max:98.9 °F (37.2 °C)     Intake/Output:     Last shift: 701 - 1900  In: -   Out: 100 [Urine:100]    Last 3 shifts: 1901 -  07  In: 521 [P.O.:140;  I.V.:552.6]  Out: 5340 [Urine:5340]          Intake/Output Summary (Last 24 hours) at 18 0834  Last data filed at 18 0825   Gross per 24 hour   Intake              893 ml   Output             4560 ml   Net            -3667 ml       Physical Exam:                                        Exam Findings Other   General: No resp distress noted, appears stated age    [de-identified]:  No ulcers, JVD not elevated, no cervical LAD    Chest: No pectus deformity, normal chest rise b/l    HEART:  RRR, no murmurs/rubs/gallops    Lungs:  Coarse, erik on left    ABD: Soft/NT, non rigid mildly distended    EXT: No cyanosis/clubbing/edema, normal peripheral pulses    Skin: No rashes or ulcers, no mottling    Neuro: Alert and answers simple questions         Medications:  Current Facility-Administered Medications   Medication Dose Route Frequency    carvedilol (COREG) tablet 12.5 mg  12.5 mg Oral BID    hydrALAZINE (APRESOLINE) tablet 25 mg  25 mg Oral TID    potassium chloride 20 mEq in 50 ml IVPB  20 mEq IntraVENous ONCE    potassium chloride SR (KLOR-CON 10) tablet 20 mEq  20 mEq Oral ONCE    magnesium sulfate 1 g/100 ml IVPB (premix or compounded)  1 g IntraVENous ONCE    dexmedeTOMidine (PRECEDEX) 400 mcg in 0.9% sodium chloride 100 mL infusion  0.2-0.7 mcg/kg/hr IntraVENous TITRATE    sodium chloride (NS) flush 5-10 mL  5-10 mL IntraVENous Q8H    sodium chloride (NS) flush 5-10 mL  5-10 mL IntraVENous PRN    albuterol-ipratropium (DUO-NEB) 2.5 MG-0.5 MG/3 ML  3 mL Nebulization Q4H RT    budesonide (PULMICORT) 500 mcg/2 ml nebulizer suspension  500 mcg Nebulization BID RT    acetylcysteine (MUCOMYST) 200 mg/mL (20 %) solution 200 mg  200 mg Nebulization QID RT    hydrALAZINE (APRESOLINE) 20 mg/mL injection 10 mg  10 mg IntraVENous Q6H PRN    fluconazole in 0.9% NaCl 100 mg IVPB  100 mg IntraVENous Q24H    0.9% sodium chloride infusion 250 mL  250 mL IntraVENous PRN    acetaminophen (TYLENOL) tablet 650 mg  650 mg Oral Q4H PRN    sodium chloride (NS) flush 20 mL  20 mL InterCATHeter PRN    sodium chloride (NS) flush 10 mL  10 mL InterCATHeter Q24H    sodium chloride (NS) flush 10 mL  10 mL InterCATHeter PRN    sodium chloride (NS) flush 10 mL  10 mL InterCATHeter Q8H    alteplase (CATHFLO) 1 mg in sterile water (preservative free) 1 mL injection  1 mg InterCATHeter PRN    bacitracin 500 unit/gram packet 1 Packet  1 Packet Topical PRN    meropenem (MERREM) 500 mg in 0.9% sodium chloride (MBP/ADV) 50 mL  0.5 g IntraVENous Q12H    levoFLOXacin (LEVAQUIN) 500 mg in D5W IVPB  500 mg IntraVENous Q48H    bumetanide (BUMEX) injection 2 mg  2 mg IntraVENous Q12H    sodium chloride (NS) flush 10-30 mL  10-30 mL InterCATHeter PRN    sodium chloride (NS) flush 10 mL  10 mL InterCATHeter Q24H    sodium chloride (NS) flush 10 mL  10 mL InterCATHeter PRN    sodium chloride (NS) flush 10-40 mL  10-40 mL InterCATHeter Q8H    sodium chloride (NS) flush 20 mL  20 mL InterCATHeter Q24H    alteplase (CATHFLO) 1 mg in sterile water (preservative free) 1 mL injection  1 mg InterCATHeter PRN    anastrozole (ARIMIDEX) tablet 1 mg  1 mg Oral DAILY    0.9% sodium chloride infusion 250 mL  250 mL IntraVENous PRN    epoetin celio (EPOGEN;PROCRIT) injection 10,000 Units  10,000 Units SubCUTAneous Q MON, WED & FRI    albuterol-ipratropium (DUO-NEB) 2.5 MG-0.5 MG/3 ML  3 mL Nebulization Q6H PRN    glucose chewable tablet 16 g  4 Tab Oral PRN    dextrose (D50W) injection syrg 12.5-25 g  12.5-25 g IntraVENous PRN    glucagon (GLUCAGEN) injection 1 mg  1 mg IntraMUSCular PRN    insulin lispro (HUMALOG) injection   SubCUTAneous AC&HS    prochlorperazine (COMPAZINE) with saline injection 5 mg  5 mg IntraVENous Q8H PRN    ascorbic acid (vitamin C) (VITAMIN C) tablet 500 mg  500 mg Oral DAILY    cholecalciferol (VITAMIN D3) tablet 1,000 Units  1,000 Units Oral DAILY    folic acid (FOLVITE) tablet 1 mg  1 mg Oral DAILY    magnesium oxide (MAG-OX) tablet 400 mg  400 mg Oral DAILY    therapeutic multivitamin (THERAGRAN) tablet 1 Tab  1 Tab Oral DAILY    fish oil-omega-3 fatty acids 340-1,000 mg capsule 1 Cap  1 Cap Oral DAILY    sodium chloride (NS) flush 5-10 mL  5-10 mL IntraVENous Q8H    sodium chloride (NS) flush 5-10 mL  5-10 mL IntraVENous PRN    ondansetron (ZOFRAN) injection 4 mg  4 mg IntraVENous Q4H PRN    levothyroxine (SYNTHROID) tablet 88 mcg  88 mcg Oral ACB       Labs:  ABG Recent Labs      04/25/18   0505  04/24/18   1227   PHI  7.360  7.394   PCO2I  45.5*  41.0   PO2I  81  87   HCO3I  25.8  25.1   SO2I  95  97        CBC Recent Labs      04/27/18   0714  04/26/18   0348  04/25/18   0453   WBC  18.8*  15.5*  20.2*   HGB  10.3*  6.7*  7.8*   HCT  32.2*  21.4*  24.9*   PLT  284  333  409*   MCV  91.5  91.5  91.5   MCH  29.3  28.6  48.2        Metabolic  Panel Recent Labs      04/27/18   0714  04/26/18   0348  04/25/18   1842  04/25/18   0453   NA  144  144  144  142   K  2.5*  3.3*  3.4*  2.9*   CL  102  108  108  105   CO2  32  27  29  25   GLU  153*  112*  123*  231*   BUN  51*  63*  60*  48*   CREA  1.42*  1.83*  1.83*  1.86*   CA  9.3  9.3  9.1  10.0   MG  1.9  1.5*   --   1.9   PHOS  3.0  3.5   --    --    ALB  2.7*   --    --    --         Pertinent Labs                JEAN PAUL Evans  4/27/2018

## 2018-04-27 NOTE — ROUTINE PROCESS
Bedside, Verbal and Written shift change report given to Renzo Merritt RN (oncoming nurse) by Rory Henry RN (offgoing nurse). Report included the following information SBAR, Kardex, ED Summary, Procedure Summary, Intake/Output, MAR, Accordion and Recent Results. Bedside, Verbal and Written shift change report given to Moira Ch RN (oncoming nurse) by Rnezo Merritt RN (offgoing nurse). Report included the following information SBAR, Kardex, ED Summary, Procedure Summary, Intake/Output, MAR, Accordion and Recent Results.

## 2018-04-27 NOTE — PROGRESS NOTES
Cardiology Progress Note            Admit Date: 4/16/2018  Admit Diagnosis: SOB (shortness of breath)  colon  Anemia  COLON  Date: 4/27/2018     Time: 2:01 PM    HPI: 82 y.o. Female with new diagnosis of CHF. Presented with chief c/o of SOB, found to have pulmonary edema and EF 40% on TTE. Noted to have NSTEMI and JEROD on CKD. Pt with hx of colon cancer and now concern for metastatic breast CA. Was in ICU over weekend (4/21-4/22) for respiratory failure, sepsis, psa UTI. Improved and transferred to floor 4/24. LHC on hold as probable metastatic disease. Interval hx: Transferred to ICU on 4/23 due to AMS and SOB. Started on nitroglycerine IV for BP control. Left lung opacification on a.m. Chest XRay 4/25/18 - mucous plug, had therapeutic bronch. Repeat CXR 4/26 with opacification of Left lung noted- therapeutic bronch repeated;       Assessment and Plan     1. NSTEMI:     -Remains with no chest pain. Medical mgmt as able given other issues. -12 lead EKG 4/24, marked ST abnormality.   -Repeat TTE:4/27/18  EF unchanged 45% with mild hypokinesis  basal-mid anteroseptal and apical walls.   -Held ASA and Plavix on 4/26 d/t worsened anemia. (hgb 6.7)- hgb now 10 s/p 1 unit PRBC    -Increase Coreg to 12.5 mg BID. (HR/BP elevated this a.m.)   -Statin intolerant due to weakness on zocor, on repatha, last LDL 31, so will not rechallenge statin     2. Cardiomyopathy/Acute HFrEF, new:  EF 45% NYHA IV     -Repeat TTE 4/26 EF unchanged 45%. -CXR with persistent congestive changes, basilar airspace disease and sm arjun pleural effusions. Improved JM aeration.    -Bumex 2 mg IV q 12 hours   -Holding ARB/ACE-I d/t elevated creatinine   -Increase coreg to 12.5 mg BID    -Daily weights, I/Os (net negative 3600/24 hours)       3.  JEROD on CKD:   Creatinine improved to 1.42 (GFR 36)    -Nephrology following: has large calcified atherosclerotic lesion of Left renal artery ostium, CT was done without contrast to determine the severity of SONALI   -diuretics per nephrology. 4. Acute respiratory failure: required bipap overnight.   -diuretics as above   -Pulmonary following- added prn ativan for anxiety. 5. Anemia: hgb improved to 10.3 s/p 1 unit PRBC. Also on procrit     7. HTN- BP  elevated   -Coreg increased to 12.5 mg BID   -Hydralazine prn    8. Leukocytosis/ pseudomonal UTI: ID on consult     9. Hx of Colon cancer, now suspected metastatic breast ca (bone, liver,calvarium mets)  -Hematology oncology following. 10  Hypokalemia/Hypomagnesemia:  K=2.9  -Repleted. 11. Advanced directives:  Partial code- no CPR, no shocks. Palliative care following. Pt still with SOB, some anxiety this a.m. TTE mildly improved, EF (45%) on repeat TTE yesterday. Domingo Herrera. MARIETTA [place] 4/27/2018 0700  Addendum:  On Rounds with Dr. Marisela Castellanos, pt more sedated, tachycardic (ST). Pt not taking po so will change po coreg to IV Lopressor. Will hold off on resuming asa and plavix for now due to dysphagia, AMS. Feel that pulmonary issues driving increased HR. Pt having difficulty managing secretions. D/W pt's son code status- confirms no shocks or CPR but still ok with intubation if needed. Cardiology Attending:Patient seen and examined. I agree with NP assessment and plans. More respiratory distress, poor outlook, discussed with son at bedside. Dalia Joel MD 4/27/2018 2:30 PM         Subjective:   Tanda Bc denies chest pain. C/o SOB and mild abdominal pain. Objective:      Physical Exam:                Visit Vitals    /70    Pulse (!) 109    Temp 98.9 °F (37.2 °C)    Resp 25    Ht 5' 1\" (1.549 m)    Wt 61.9 kg (136 lb 7.4 oz)    SpO2 100%    BMI 25.78 kg/m2          General Appearance:   elderly female, appears weak, mildly tachypneic   Ears/Nose/Mouth/Throat:    Hearing grossly normal.         Neck:  Supple.    Chest:     Course breath sounds bilaterally, + mild SOB     Cardiovascular:   Regular rate and rhythm, S1, S2 normal, no murmur,    Abdomen:    Soft, mild ttp, no gaurding. Extremities:  No edema bilaterally. Skin:  Warm and dry. Telemetry: Sinus rhythm- sinus tachycardia. Data Review:    Labs:    Recent Results (from the past 24 hour(s))   CULTURE, RESPIRATORY/SPUTUM/BRONCH W GRAM STAIN    Collection Time: 04/26/18  9:10 AM   Result Value Ref Range    Special Requests: NO SPECIAL REQUESTS      GRAM STAIN 2+ WBCS SEEN      GRAM STAIN 2+ BUDDING YEAST      GRAM STAIN 2+ EPITHELIAL CELLS SEEN      Culture result: PENDING    GLUCOSE, POC    Collection Time: 04/26/18 12:45 PM   Result Value Ref Range    Glucose (POC) 141 (H) 65 - 100 mg/dL    Performed by [address] [place] [address], POC    Collection Time: 04/26/18  5:24 PM   Result Value Ref Range    Glucose (POC) 148 (H) 65 - 100 mg/dL    Performed by [address] [place] [address], POC    Collection Time: 04/26/18  9:25 PM   Result Value Ref Range    Glucose (POC) 120 (H) 65 - 100 mg/dL    Performed by Robert Sharp    CBC WITH AUTOMATED DIFF    Collection Time: 04/27/18  7:14 AM   Result Value Ref Range    WBC 18.8 (H) 3.6 - 11.0 K/uL    RBC 3.52 (L) 3.80 - 5.20 M/uL    HGB 10.3 (L) 11.5 - 16.0 g/dL    HCT 32.2 (L) 35.0 - 47.0 %    MCV 91.5 80.0 - 99.0 FL    MCH 29.3 26.0 - 34.0 PG    MCHC 32.0 30.0 - 36.5 g/dL    RDW 23.0 (H) 11.5 - 14.5 %    PLATELET 290 568 - 816 K/uL    MPV 9.7 8.9 - 12.9 FL    NRBC 1.5 (H) 0  WBC    ABSOLUTE NRBC 0.29 (H) 0.00 - 0.01 K/uL    NEUTROPHILS 83 (H) 32 - 75 %    LYMPHOCYTES 6 (L) 12 - 49 %    MONOCYTES 9 5 - 13 %    EOSINOPHILS 0 0 - 7 %    BASOPHILS 0 0 - 1 %    IMMATURE GRANULOCYTES 2 (H) 0.0 - 0.5 %    ABS. NEUTROPHILS 15.6 (H) 1.8 - 8.0 K/UL    ABS. LYMPHOCYTES 1.1 0.8 - 3.5 K/UL    ABS. MONOCYTES 1.7 (H) 0.0 - 1.0 K/UL    ABS. EOSINOPHILS 0.0 0.0 - 0.4 K/UL    ABS. BASOPHILS 0.0 0.0 - 0.1 K/UL    ABS. IMM.  GRANS. 0.4 (H) 0.00 - 0.04 K/UL    DF SMEAR SCANNED      RBC COMMENTS POLYCHROMASIA  1+        RBC COMMENTS ANISOCYTOSIS  3+        RBC COMMENTS MACROCYTOSIS  1+        RBC COMMENTS OVALOCYTES  PRESENT        RBC COMMENTS TARGET CELLS  PRESENT        RBC COMMENTS HYPOCHROMIA  2+        RBC COMMENTS TEARDROP CELLS  PRESENT       RENAL FUNCTION PANEL    Collection Time: 04/27/18  7:14 AM   Result Value Ref Range    Sodium 144 136 - 145 mmol/L    Potassium 2.5 (LL) 3.5 - 5.1 mmol/L    Chloride 102 97 - 108 mmol/L    CO2 32 21 - 32 mmol/L    Anion gap 10 5 - 15 mmol/L    Glucose 153 (H) 65 - 100 mg/dL    BUN 51 (H) 6 - 20 MG/DL    Creatinine 1.42 (H) 0.55 - 1.02 MG/DL    BUN/Creatinine ratio 36 (H) 12 - 20      GFR est AA 43 (L) >60 ml/min/1.73m2    GFR est non-AA 36 (L) >60 ml/min/1.73m2    Calcium 9.3 8.5 - 10.1 MG/DL    Phosphorus 3.0 2.6 - 4.7 MG/DL    Albumin 2.7 (L) 3.5 - 5.0 g/dL   MAGNESIUM    Collection Time: 04/27/18  7:14 AM   Result Value Ref Range    Magnesium 1.9 1.6 - 2.4 mg/dL   GLUCOSE, POC    Collection Time: 04/27/18  7:19 AM   Result Value Ref Range    Glucose (POC) 168 (H) 65 - 100 mg/dL    Performed by Delfin Altamirano           Radiology:        Current Facility-Administered Medications   Medication Dose Route Frequency    carvedilol (COREG) tablet 12.5 mg  12.5 mg Oral BID    hydrALAZINE (APRESOLINE) tablet 25 mg  25 mg Oral TID    potassium chloride 20 mEq in 50 ml IVPB  20 mEq IntraVENous ONCE    potassium chloride SR (KLOR-CON 10) tablet 20 mEq  20 mEq Oral ONCE    magnesium sulfate 1 g/100 ml IVPB (premix or compounded)  1 g IntraVENous ONCE    sodium chloride (NS) flush 5-10 mL  5-10 mL IntraVENous Q8H    sodium chloride (NS) flush 5-10 mL  5-10 mL IntraVENous PRN    albuterol-ipratropium (DUO-NEB) 2.5 MG-0.5 MG/3 ML  3 mL Nebulization Q4H RT    budesonide (PULMICORT) 500 mcg/2 ml nebulizer suspension  500 mcg Nebulization BID RT    acetylcysteine (MUCOMYST) 200 mg/mL (20 %) solution 200 mg  200 mg Nebulization QID RT    hydrALAZINE (APRESOLINE) 20 mg/mL injection 10 mg  10 mg IntraVENous Q6H PRN    fluconazole in 0.9% NaCl 100 mg IVPB  100 mg IntraVENous Q24H    0.9% sodium chloride infusion 250 mL  250 mL IntraVENous PRN    acetaminophen (TYLENOL) tablet 650 mg  650 mg Oral Q4H PRN    sodium chloride (NS) flush 20 mL  20 mL InterCATHeter PRN    sodium chloride (NS) flush 10 mL  10 mL InterCATHeter Q24H    sodium chloride (NS) flush 10 mL  10 mL InterCATHeter PRN    sodium chloride (NS) flush 10 mL  10 mL InterCATHeter Q8H    alteplase (CATHFLO) 1 mg in sterile water (preservative free) 1 mL injection  1 mg InterCATHeter PRN    bacitracin 500 unit/gram packet 1 Packet  1 Packet Topical PRN    meropenem (MERREM) 500 mg in 0.9% sodium chloride (MBP/ADV) 50 mL  0.5 g IntraVENous Q12H    levoFLOXacin (LEVAQUIN) 500 mg in D5W IVPB  500 mg IntraVENous Q48H    bumetanide (BUMEX) injection 2 mg  2 mg IntraVENous Q12H    sodium chloride (NS) flush 10-30 mL  10-30 mL InterCATHeter PRN    sodium chloride (NS) flush 10 mL  10 mL InterCATHeter Q24H    sodium chloride (NS) flush 10 mL  10 mL InterCATHeter PRN    sodium chloride (NS) flush 10-40 mL  10-40 mL InterCATHeter Q8H    sodium chloride (NS) flush 20 mL  20 mL InterCATHeter Q24H    alteplase (CATHFLO) 1 mg in sterile water (preservative free) 1 mL injection  1 mg InterCATHeter PRN    anastrozole (ARIMIDEX) tablet 1 mg  1 mg Oral DAILY    0.9% sodium chloride infusion 250 mL  250 mL IntraVENous PRN    epoetin celio (EPOGEN;PROCRIT) injection 10,000 Units  10,000 Units SubCUTAneous Q MON, WED & FRI    albuterol-ipratropium (DUO-NEB) 2.5 MG-0.5 MG/3 ML  3 mL Nebulization Q6H PRN    glucose chewable tablet 16 g  4 Tab Oral PRN    dextrose (D50W) injection syrg 12.5-25 g  12.5-25 g IntraVENous PRN    glucagon (GLUCAGEN) injection 1 mg  1 mg IntraMUSCular PRN    insulin lispro (HUMALOG) injection   SubCUTAneous AC&HS    prochlorperazine (COMPAZINE) with saline injection 5 mg  5 mg IntraVENous Q8H PRN    ascorbic acid (vitamin C) (VITAMIN C) tablet 500 mg  500 mg Oral DAILY    cholecalciferol (VITAMIN D3) tablet 1,000 Units  1,000 Units Oral DAILY    folic acid (FOLVITE) tablet 1 mg  1 mg Oral DAILY    magnesium oxide (MAG-OX) tablet 400 mg  400 mg Oral DAILY    therapeutic multivitamin (THERAGRAN) tablet 1 Tab  1 Tab Oral DAILY    fish oil-omega-3 fatty acids 340-1,000 mg capsule 1 Cap  1 Cap Oral DAILY    sodium chloride (NS) flush 5-10 mL  5-10 mL IntraVENous Q8H    sodium chloride (NS) flush 5-10 mL  5-10 mL IntraVENous PRN    ondansetron (ZOFRAN) injection 4 mg  4 mg IntraVENous Q4H PRN    levothyroxine (SYNTHROID) tablet 88 mcg  88 mcg Oral ACB     Pt critically ill, poor prognosis, multiple severe issues. Shavonne Richards.  MARIETTA [place]     [facility] [address] [place] [address], [address] [place] [address] [address]   Aidan [place]   [phone]

## 2018-04-27 NOTE — PROGRESS NOTES
0800. Report received and care assumed. Pt given one time dose of Ativan per Dr. Ginger Rapp, placed on 4L NC. VSS at this time. Pt's son at the bedside, updated about events over night. Will monitor. 1130. White with Pulmonary at the bedside to discuss the patient's condition with family. Orders received. 1257. Dr. Ginger Rapp updated on patient's most recent ABG results. Orders received.     1330. Pt NT suctioned, unable to obtain much sputum. Patient encouraged to cough and utilize incentive spirometer. 1439. Spoke with Dr. Jasiel Webber with GI. Updated him on patient status this afternoon and issues with difficulty breathing throughout shift. Per MD will hold off on colonoscopy until next week to see how patient progresses. 1630. Spoke in length with the patient's son about care decisions and the patient's current respiratory status. Pt's son relayed to me how today's events have him rethinking the trajectory of her out come, and how he would like to re-visit the patient's code status after discussing with family members. I informed the patient's son that currently she is moderately dependant on the bi-pap machine for adequate oxygenation, as we have had difficulty maintaining respiratory status on the nasal cannula. Pt's son expresses gratitude for the physicians and nursing staff caring for his mother and states \"I am beginning to realize that she may not leave here\". Will follow up with patient family regarding care decisions when ready. 1715. Nasal trumpet in place. Patient placed on nasal cannula.  breath sounds noted in the left lung, unlike my previous assessment at 1600. Dr. Miles Grew notified. Orders received to continue suctioning via nasal trumpet and place the patient on hi-flow nasal cannula. Will implement and monitor closely. 1730. Updated the son on recent events. Son states he will be having his \"doctor friend\" come over to discuss care decisions with him.  Pt's son expressing he may want to move to a DNR order for the patient.

## 2018-04-27 NOTE — PROGRESS NOTES
Hospitalist Progress Note  Romana Schuster MD  Answering service: [phone] OR 36 from in house phone  Cell: [phone]      Date of Service:  2018  NAME:  Wanda Bradley  :  1935  MRN:  [id number]      Admission Summary:   The patient is an 61-year-old female with history of diabetes and rheumatoid arthritis who presented to the emergency room via EMS with complaints of shortness of breath    Interval history / Subjective:   More lethargic, on BiPAP     Assessment & Plan:     Acute on chronic hypoxic/hypercarbic respiratory failure due to pulmonary edema, pneumonia  -on BiPAP support, duo neb, bumex 2 mg bid, IV antibiotics,  monitor pulse ox  -repeated chest x ray  there is pulmonary vascular prominence, bilateral interstitial edema, bibasilar airspace disease and posterior layering bilateral pleural effusions, unchanged.   -s/p bronchoscopy on  and on  suctioned and cleared  -respiratory culture grow on  and  grow candida albicans, on diflucan  -intensivist on board    Acute encephalopathy possible due to metabolic  -RRT was called for restless and agitation early this morning, patient become more confused, disoriented and lethargic, wakeful to voice, non verbal but follows simple command  -CT head on  volume loss and minimal white matter disease. No acute intracranial [place], Sinus mucosal inflammatory change  -MRI of brain  Study limited by motion artifact, volume loss and white matter disease. No definite intracranial metastasis however no intravenous contrast was administered due to poor renal function.  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  -patient more lethargic, continue neuro check and supportive care  -neurologist onboard    Sepsis secondary to E Coli UTI, pneumonia  POA  -on zosyn, vancomycin, meropenem, and fluconazole   -urine cx   grow e coli, pseudomanas sp  -blood cx on ,  and  no growth    NSTEMI  -on fish oil  -aspirin and plavix held due to low Hgb  -cardiologist on board    Acute systolic CHF NYHA Class IV  -on bumex  -not on BB or ACEi/ ARB due to hypotension and renal insufficiency   -monitor I/O and daily weight    JEROD on CKD stage III  -creatinine improving  -on bumex 2 mg iv bid  -monitor renal function, urine output  -nephrologist on board    Possible metastatic left breast cancer  -on armidex   -Hem/Onc on board    HTN  -on iv metoprolol, nitro gtt, received iv hydralazine, monitor BP    DM-II  -continue insulin sliding scale, monitor finger stick glucose    Hypothyroidism  -continue synthroid    Hx of GERD  -continue pepcid    Elevated liver enzyme   -possible related to liver lesion, monitor LFT    Anemia multifactorial  -Hgb 6.7, s/p 2 units pRBC on 4/26, repeated Hgb 10.3 , monitor cbc    Hx of colon cancer 25 years ago  -according to son, there is a work-up planned with [facility] regarding the possibility of recurrent CA (based on lesions seen in the calvarium on MRI - 3/19). -hem/onc on board    Code status: PAR, at risk for decompensation, No CPR but ok for ACLS/BLS meds, shock and intubation  Case discussed with her son at bedside, at risk for decompensation,  consult to palliative care team.     Code status: PARTIAL   DVT prophylaxis:SCD    Care Plan discussed with: Patient/Family and Nurse  Disposition: Transfer to ICU      Case discussed with her son, Jesus Alberto Gilbert , on 4/27 questions answered     Hospital Problems  Date Reviewed: 4/16/2018          Codes Class Noted POA    Acute systolic heart failure ([facility] [address] ICD-10-CM: I50.21  ICD-9-CM: 428.21  4/24/2018 Unknown        Altered mental status, unspecified ICD-10-CM: R41.82  ICD-9-CM: 780.97  4/23/2018 Unknown        * (Principal)SOB (shortness of breath) ICD-10-CM: R06.02  ICD-9-CM: 786.05  4/16/2018 Unknown                Vital Signs:    Last 24hrs VS reviewed since prior progress note.  Most recent are:  Visit Vitals    /72    Pulse (!) 121    Temp 98.7 °F (37.1 °C)    Resp (!) 31    Ht 5' 1\" (1.549 m)    Wt 61.9 kg (136 lb 7.4 oz)    SpO2 98%    BMI 25.78 kg/m2         Intake/Output Summary (Last 24 hours) at 04/27/18 1532  Last data filed at 04/27/18 1453   Gross per 24 hour   Intake              340 ml   Output             4015 ml   Net            -3675 ml        Physical Examination:             Constitutional:  On BiPAP, not in distress, cooperative, pleasant    ENT:  Oral mucous moist, oropharynx benign. Neck supple,    Resp:  Decrease bronchial breath sound, few wheezing and rhonic bilaterally. No accessory muscle use   CV:  Regular rhythm, normal rate, no murmurs, gallops, rubs    GI:  Soft, non distended, non tender. normoactive bowel sounds, no hepatosplenomegaly     Musculoskeletal:  pedal edema    Neurologic:  Lethargic, wakeful to touch     Skin:  Good turgor, no rashes or ulcers       Data Review:    Review and/or order of clinical lab test      Labs:     Recent Labs      04/27/18 0714 04/26/18 0348   WBC  18.8*  15.5*   HGB  10.3*  6.7*   HCT  32.2*  21.4*   PLT  284  333     Recent Labs      04/27/18   0714  04/26/18   0348  04/25/18   1842  04/25/18   0453   NA  144  144  144  142   K  2.5*  3.3*  3.4*  2.9*   CL  102  108  108  105   CO2  32  27  29  25   BUN  51*  63*  60*  48*   CREA  1.42*  1.83*  1.83*  1.86*   GLU  153*  112*  123*  231*   CA  9.3  9.3  9.1  10.0   MG  1.9  1.5*   --   1.9   PHOS  3.0  3.5   --    --      Recent Labs      04/27/18 0714   ALB  2.7*     No results for input(s): INR, PTP, APTT in the last 72 hours. No lab exists for component: INREXT, INREXT   No results for input(s): FE, TIBC, PSAT, FERR in the last 72 hours. No results found for: FOL, RBCF   No results for input(s): PH, PCO2, PO2 in the last 72 hours. No results for input(s): CPK, CKNDX, TROIQ in the last 72 hours.     No lab exists for component: CPKMB  Lab Results   Component Value Date/Time    Cholesterol, total 74 04/16/2018 04:21 AM    HDL Cholesterol 25 04/16/2018 04:21 AM    LDL, calculated 31.6 04/16/2018 04:21 AM    Triglyceride 87 04/16/2018 04:21 AM    CHOL/HDL Ratio 3.0 04/16/2018 04:21 AM     Lab Results   Component Value Date/Time    Glucose (POC) 150 (H) 04/27/2018 12:44 PM    Glucose (POC) 168 (H) 04/27/2018 07:19 AM    Glucose (POC) 120 (H) 04/26/2018 09:25 PM    Glucose (POC) 148 (H) 04/26/2018 05:24 PM    Glucose (POC) 141 (H) 04/26/2018 12:45 PM     Lab Results   Component Value Date/Time    Color YELLOW/STRAW 04/20/2018 04:30 AM    Appearance TURBID (A) 04/20/2018 04:30 AM    Specific gravity 1.029 04/20/2018 04:30 AM    pH (UA) 5.0 04/20/2018 04:30 AM    Protein 100 (A) 04/20/2018 04:30 AM    Glucose NEGATIVE  04/20/2018 04:30 AM    Ketone NEGATIVE  04/20/2018 04:30 AM    Bilirubin NEGATIVE  04/20/2018 04:30 AM    Urobilinogen 0.2 04/20/2018 04:30 AM    Nitrites NEGATIVE  04/20/2018 04:30 AM    Leukocyte Esterase LARGE (A) 04/20/2018 04:30 AM    Epithelial cells FEW 04/20/2018 04:30 AM    Bacteria 3+ (A) 04/20/2018 04:30 AM    WBC >100 (H) 04/20/2018 04:30 AM    RBC 5-10 04/20/2018 04:30 AM         Medications Reviewed:     Current Facility-Administered Medications   Medication Dose Route Frequency    hydrALAZINE (APRESOLINE) tablet 25 mg  25 mg Oral TID    potassium chloride SR (KLOR-CON 10) tablet 20 mEq  20 mEq Oral ONCE    [START ON 4/28/2018] levoFLOXacin (LEVAQUIN) 750 mg in D5W IVPB  750 mg IntraVENous Q48H    bumetanide (BUMEX) injection 1 mg  1 mg IntraVENous Q12H    potassium chloride 20 mEq in 50 ml IVPB  20 mEq IntraVENous Q1H    metoprolol (LOPRESSOR) injection 5 mg  5 mg IntraVENous Q4H    sodium chloride (NS) flush 5-10 mL  5-10 mL IntraVENous Q8H    sodium chloride (NS) flush 5-10 mL  5-10 mL IntraVENous PRN    albuterol-ipratropium (DUO-NEB) 2.5 MG-0.5 MG/3 ML  3 mL Nebulization Q4H RT    budesonide (PULMICORT) 500 mcg/2 ml nebulizer suspension 500 mcg Nebulization BID RT    acetylcysteine (MUCOMYST) 200 mg/mL (20 %) solution 200 mg  200 mg Nebulization QID RT    hydrALAZINE (APRESOLINE) 20 mg/mL injection 10 mg  10 mg IntraVENous Q6H PRN    fluconazole in 0.9% NaCl 100 mg IVPB  100 mg IntraVENous Q24H    0.9% sodium chloride infusion 250 mL  250 mL IntraVENous PRN    acetaminophen (TYLENOL) tablet 650 mg  650 mg Oral Q4H PRN    sodium chloride (NS) flush 20 mL  20 mL InterCATHeter PRN    sodium chloride (NS) flush 10 mL  10 mL InterCATHeter Q24H    sodium chloride (NS) flush 10 mL  10 mL InterCATHeter PRN    sodium chloride (NS) flush 10 mL  10 mL InterCATHeter Q8H    alteplase (CATHFLO) 1 mg in sterile water (preservative free) 1 mL injection  1 mg InterCATHeter PRN    bacitracin 500 unit/gram packet 1 Packet  1 Packet Topical PRN    meropenem (MERREM) 500 mg in 0.9% sodium chloride (MBP/ADV) 50 mL  0.5 g IntraVENous Q12H    sodium chloride (NS) flush 10-30 mL  10-30 mL InterCATHeter PRN    sodium chloride (NS) flush 10 mL  10 mL InterCATHeter Q24H    sodium chloride (NS) flush 10 mL  10 mL InterCATHeter PRN    sodium chloride (NS) flush 10-40 mL  10-40 mL InterCATHeter Q8H    sodium chloride (NS) flush 20 mL  20 mL InterCATHeter Q24H    anastrozole (ARIMIDEX) tablet 1 mg  1 mg Oral DAILY    0.9% sodium chloride infusion 250 mL  250 mL IntraVENous PRN    epoetin celio (EPOGEN;PROCRIT) injection 10,000 Units  10,000 Units SubCUTAneous Q MON, WED & FRI    albuterol-ipratropium (DUO-NEB) 2.5 MG-0.5 MG/3 ML  3 mL Nebulization Q6H PRN    glucose chewable tablet 16 g  4 Tab Oral PRN    dextrose (D50W) injection syrg 12.5-25 g  12.5-25 g IntraVENous PRN    glucagon (GLUCAGEN) injection 1 mg  1 mg IntraMUSCular PRN    insulin lispro (HUMALOG) injection   SubCUTAneous AC&HS    prochlorperazine (COMPAZINE) with saline injection 5 mg  5 mg IntraVENous Q8H PRN    ascorbic acid (vitamin C) (VITAMIN C) tablet 500 mg  500 mg Oral DAILY    cholecalciferol (VITAMIN D3) tablet 1,000 Units  1,000 Units Oral DAILY    folic acid (FOLVITE) tablet 1 mg  1 mg Oral DAILY    magnesium oxide (MAG-OX) tablet 400 mg  400 mg Oral DAILY    therapeutic multivitamin (THERAGRAN) tablet 1 Tab  1 Tab Oral DAILY    fish oil-omega-3 fatty acids 340-1,000 mg capsule 1 Cap  1 Cap Oral DAILY    sodium chloride (NS) flush 5-10 mL  5-10 mL IntraVENous Q8H    sodium chloride (NS) flush 5-10 mL  5-10 mL IntraVENous PRN    ondansetron (ZOFRAN) injection 4 mg  4 mg IntraVENous Q4H PRN    levothyroxine (SYNTHROID) tablet 88 mcg  88 mcg Oral ACB     ______________________________________________________________________  EXPECTED LENGTH OF STAY: 4d 12h  ACTUAL LENGTH OF STAY:          11                 Lane Silva MD

## 2018-04-28 LAB
ALBUMIN SERPL-MCNC: 2.6 G/DL (ref 3.5–5)
ANION GAP SERPL CALC-SCNC: 11 MMOL/L (ref 5–15)
ANION GAP SERPL CALC-SCNC: 6 MMOL/L (ref 5–15)
BACTERIA SPEC CULT: ABNORMAL
BACTERIA SPEC CULT: ABNORMAL
BASOPHILS # BLD: 0 K/UL (ref 0–0.1)
BASOPHILS NFR BLD: 0 % (ref 0–1)
BUN SERPL-MCNC: 48 MG/DL (ref 6–20)
BUN SERPL-MCNC: 51 MG/DL (ref 6–20)
BUN/CREAT SERPL: 35 (ref 12–20)
BUN/CREAT SERPL: 37 (ref 12–20)
CALCIUM SERPL-MCNC: 9.3 MG/DL (ref 8.5–10.1)
CALCIUM SERPL-MCNC: 9.5 MG/DL (ref 8.5–10.1)
CHLORIDE SERPL-SCNC: 104 MMOL/L (ref 97–108)
CHLORIDE SERPL-SCNC: 108 MMOL/L (ref 97–108)
CO2 SERPL-SCNC: 33 MMOL/L (ref 21–32)
CO2 SERPL-SCNC: 36 MMOL/L (ref 21–32)
CREAT SERPL-MCNC: 1.29 MG/DL (ref 0.55–1.02)
CREAT SERPL-MCNC: 1.44 MG/DL (ref 0.55–1.02)
DIFFERENTIAL METHOD BLD: ABNORMAL
EOSINOPHIL # BLD: 0 K/UL (ref 0–0.4)
EOSINOPHIL NFR BLD: 0 % (ref 0–7)
ERYTHROCYTE [DISTWIDTH] IN BLOOD BY AUTOMATED COUNT: 23.5 % (ref 11.5–14.5)
GLUCOSE BLD STRIP.AUTO-MCNC: 148 MG/DL (ref 65–100)
GLUCOSE BLD STRIP.AUTO-MCNC: 162 MG/DL (ref 65–100)
GLUCOSE BLD STRIP.AUTO-MCNC: 167 MG/DL (ref 65–100)
GLUCOSE SERPL-MCNC: 147 MG/DL (ref 65–100)
GLUCOSE SERPL-MCNC: 168 MG/DL (ref 65–100)
GRAM STN SPEC: ABNORMAL
HCT VFR BLD AUTO: 34.5 % (ref 35–47)
HGB BLD-MCNC: 11 G/DL (ref 11.5–16)
IMM GRANULOCYTES # BLD: 0.2 K/UL
IMM GRANULOCYTES NFR BLD AUTO: 1 %
LYMPHOCYTES # BLD: 0.7 K/UL (ref 0.8–3.5)
LYMPHOCYTES NFR BLD: 4 % (ref 12–49)
MAGNESIUM SERPL-MCNC: 1.9 MG/DL (ref 1.6–2.4)
MCH RBC QN AUTO: 29.3 PG (ref 26–34)
MCHC RBC AUTO-ENTMCNC: 31.9 G/DL (ref 30–36.5)
MCV RBC AUTO: 92 FL (ref 80–99)
MONOCYTES # BLD: 1.2 K/UL (ref 0–1)
MONOCYTES NFR BLD: 7 % (ref 5–13)
MYELOCYTES NFR BLD MANUAL: 1 %
NEUTS SEG # BLD: 15.2 K/UL (ref 1.8–8)
NEUTS SEG NFR BLD: 87 % (ref 32–75)
NRBC # BLD: 0.24 K/UL (ref 0–0.01)
NRBC BLD-RTO: 1.4 PER 100 WBC
PHOSPHATE SERPL-MCNC: 1.5 MG/DL (ref 2.6–4.7)
PLATELET # BLD AUTO: 320 K/UL (ref 150–400)
PMV BLD AUTO: 10 FL (ref 8.9–12.9)
POTASSIUM SERPL-SCNC: 2.4 MMOL/L (ref 3.5–5.1)
POTASSIUM SERPL-SCNC: 3.8 MMOL/L (ref 3.5–5.1)
RBC # BLD AUTO: 3.75 M/UL (ref 3.8–5.2)
RBC MORPH BLD: ABNORMAL
SERVICE CMNT-IMP: ABNORMAL
SODIUM SERPL-SCNC: 148 MMOL/L (ref 136–145)
SODIUM SERPL-SCNC: 150 MMOL/L (ref 136–145)
WBC # BLD AUTO: 17.5 K/UL (ref 3.6–11)

## 2018-04-28 PROCEDURE — 74011250637 HC RX REV CODE- 250/637: Performed by: HOSPITALIST

## 2018-04-28 PROCEDURE — 74011250636 HC RX REV CODE- 250/636: Performed by: INTERNAL MEDICINE

## 2018-04-28 PROCEDURE — 74011250637 HC RX REV CODE- 250/637: Performed by: INTERNAL MEDICINE

## 2018-04-28 PROCEDURE — 74011000250 HC RX REV CODE- 250: Performed by: INTERNAL MEDICINE

## 2018-04-28 PROCEDURE — 36415 COLL VENOUS BLD VENIPUNCTURE: CPT | Performed by: PHYSICIAN ASSISTANT

## 2018-04-28 PROCEDURE — 74011250636 HC RX REV CODE- 250/636: Performed by: HOSPITALIST

## 2018-04-28 PROCEDURE — 85025 COMPLETE CBC W/AUTO DIFF WBC: CPT | Performed by: PHYSICIAN ASSISTANT

## 2018-04-28 PROCEDURE — 36592 COLLECT BLOOD FROM PICC: CPT

## 2018-04-28 PROCEDURE — 94640 AIRWAY INHALATION TREATMENT: CPT

## 2018-04-28 PROCEDURE — 80069 RENAL FUNCTION PANEL: CPT | Performed by: INTERNAL MEDICINE

## 2018-04-28 PROCEDURE — 94669 MECHANICAL CHEST WALL OSCILL: CPT

## 2018-04-28 PROCEDURE — 74011000258 HC RX REV CODE- 258: Performed by: INTERNAL MEDICINE

## 2018-04-28 PROCEDURE — 83735 ASSAY OF MAGNESIUM: CPT | Performed by: PHYSICIAN ASSISTANT

## 2018-04-28 PROCEDURE — 82962 GLUCOSE BLOOD TEST: CPT

## 2018-04-28 PROCEDURE — 74011636637 HC RX REV CODE- 636/637: Performed by: HOSPITALIST

## 2018-04-28 PROCEDURE — 77010033711 HC HIGH FLOW OXYGEN

## 2018-04-28 PROCEDURE — 65610000006 HC RM INTENSIVE CARE

## 2018-04-28 PROCEDURE — 74011000250 HC RX REV CODE- 250: Performed by: NURSE PRACTITIONER

## 2018-04-28 PROCEDURE — 80048 BASIC METABOLIC PNL TOTAL CA: CPT | Performed by: PHYSICIAN ASSISTANT

## 2018-04-28 RX ORDER — POTASSIUM CHLORIDE 29.8 MG/ML
20 INJECTION INTRAVENOUS
Status: COMPLETED | OUTPATIENT
Start: 2018-04-28 | End: 2018-04-28

## 2018-04-28 RX ORDER — SPIRONOLACTONE 25 MG/1
25 TABLET ORAL DAILY
Status: DISCONTINUED | OUTPATIENT
Start: 2018-04-28 | End: 2018-05-11 | Stop reason: HOSPADM

## 2018-04-28 RX ORDER — POTASSIUM CHLORIDE 20MEQ/15ML
30 LIQUID (ML) ORAL 2 TIMES DAILY WITH MEALS
Status: DISCONTINUED | OUTPATIENT
Start: 2018-04-28 | End: 2018-04-28

## 2018-04-28 RX ORDER — ACETAMINOPHEN 650 MG/1
650 SUPPOSITORY RECTAL
Status: DISCONTINUED | OUTPATIENT
Start: 2018-04-28 | End: 2018-05-11 | Stop reason: HOSPADM

## 2018-04-28 RX ORDER — DEXTROSE AND POTASSIUM CHLORIDE 5; .15 G/100ML; G/100ML
SOLUTION INTRAVENOUS CONTINUOUS
Status: DISCONTINUED | OUTPATIENT
Start: 2018-04-28 | End: 2018-04-30

## 2018-04-28 RX ADMIN — ANASTROZOLE 1 MG: 1 TABLET, COATED ORAL at 19:12

## 2018-04-28 RX ADMIN — Medication 30 ML: at 06:43

## 2018-04-28 RX ADMIN — ACETYLCYSTEINE 200 MG: 200 SOLUTION ORAL; RESPIRATORY (INHALATION) at 07:50

## 2018-04-28 RX ADMIN — POTASSIUM CHLORIDE 20 MEQ: 400 INJECTION, SOLUTION INTRAVENOUS at 10:15

## 2018-04-28 RX ADMIN — MEROPENEM 500 MG: 500 INJECTION, POWDER, FOR SOLUTION INTRAVENOUS at 00:03

## 2018-04-28 RX ADMIN — ACETAMINOPHEN 650 MG: 650 SUPPOSITORY RECTAL at 16:23

## 2018-04-28 RX ADMIN — INSULIN LISPRO 2 UNITS: 100 INJECTION, SOLUTION INTRAVENOUS; SUBCUTANEOUS at 06:47

## 2018-04-28 RX ADMIN — Medication 10 ML: at 13:21

## 2018-04-28 RX ADMIN — IPRATROPIUM BROMIDE AND ALBUTEROL SULFATE 3 ML: .5; 3 SOLUTION RESPIRATORY (INHALATION) at 03:15

## 2018-04-28 RX ADMIN — HYDRALAZINE HYDROCHLORIDE 25 MG: 25 TABLET ORAL at 08:49

## 2018-04-28 RX ADMIN — POTASSIUM CHLORIDE 30 MEQ: 20 SOLUTION ORAL at 08:46

## 2018-04-28 RX ADMIN — ACETYLCYSTEINE 200 MG: 200 SOLUTION ORAL; RESPIRATORY (INHALATION) at 19:26

## 2018-04-28 RX ADMIN — IPRATROPIUM BROMIDE AND ALBUTEROL SULFATE 3 ML: .5; 3 SOLUTION RESPIRATORY (INHALATION) at 23:31

## 2018-04-28 RX ADMIN — Medication 10 ML: at 06:43

## 2018-04-28 RX ADMIN — Medication 400 MG: at 08:49

## 2018-04-28 RX ADMIN — Medication 30 ML: at 13:20

## 2018-04-28 RX ADMIN — Medication 10 ML: at 22:00

## 2018-04-28 RX ADMIN — Medication 10 ML: at 13:20

## 2018-04-28 RX ADMIN — MEROPENEM 500 MG: 500 INJECTION, POWDER, FOR SOLUTION INTRAVENOUS at 13:17

## 2018-04-28 RX ADMIN — LEVOFLOXACIN 750 MG: 5 INJECTION, SOLUTION INTRAVENOUS at 15:46

## 2018-04-28 RX ADMIN — POTASSIUM CHLORIDE 20 MEQ: 400 INJECTION, SOLUTION INTRAVENOUS at 11:29

## 2018-04-28 RX ADMIN — ACETYLCYSTEINE 200 MG: 200 SOLUTION ORAL; RESPIRATORY (INHALATION) at 11:50

## 2018-04-28 RX ADMIN — INSULIN LISPRO 2 UNITS: 100 INJECTION, SOLUTION INTRAVENOUS; SUBCUTANEOUS at 11:42

## 2018-04-28 RX ADMIN — POTASSIUM PHOSPHATE, MONOBASIC AND POTASSIUM PHOSPHATE, DIBASIC: 224; 236 INJECTION, SOLUTION, CONCENTRATE INTRAVENOUS at 17:37

## 2018-04-28 RX ADMIN — DEXTROSE MONOHYDRATE AND POTASSIUM CHLORIDE: 5; .149 INJECTION, SOLUTION INTRAVENOUS at 17:35

## 2018-04-28 RX ADMIN — POTASSIUM CHLORIDE 20 MEQ: 400 INJECTION, SOLUTION INTRAVENOUS at 08:36

## 2018-04-28 RX ADMIN — METOPROLOL TARTRATE 5 MG: 5 INJECTION, SOLUTION INTRAVENOUS at 06:43

## 2018-04-28 RX ADMIN — INSULIN LISPRO 2 UNITS: 100 INJECTION, SOLUTION INTRAVENOUS; SUBCUTANEOUS at 15:55

## 2018-04-28 RX ADMIN — BUDESONIDE 500 MCG: 0.5 INHALANT RESPIRATORY (INHALATION) at 07:50

## 2018-04-28 RX ADMIN — IPRATROPIUM BROMIDE AND ALBUTEROL SULFATE 3 ML: .5; 3 SOLUTION RESPIRATORY (INHALATION) at 11:50

## 2018-04-28 RX ADMIN — SPIRONOLACTONE 25 MG: 25 TABLET, FILM COATED ORAL at 09:05

## 2018-04-28 RX ADMIN — HYDRALAZINE HYDROCHLORIDE 10 MG: 20 INJECTION INTRAMUSCULAR; INTRAVENOUS at 15:07

## 2018-04-28 RX ADMIN — IPRATROPIUM BROMIDE AND ALBUTEROL SULFATE 3 ML: .5; 3 SOLUTION RESPIRATORY (INHALATION) at 19:25

## 2018-04-28 RX ADMIN — INSULIN LISPRO 2 UNITS: 100 INJECTION, SOLUTION INTRAVENOUS; SUBCUTANEOUS at 22:08

## 2018-04-28 RX ADMIN — HYDRALAZINE HYDROCHLORIDE 25 MG: 25 TABLET ORAL at 22:42

## 2018-04-28 RX ADMIN — LEVOTHYROXINE SODIUM 88 MCG: 88 TABLET ORAL at 07:30

## 2018-04-28 RX ADMIN — FLUCONAZOLE 100 MG: 2 INJECTION INTRAVENOUS at 11:48

## 2018-04-28 RX ADMIN — METOPROLOL TARTRATE 5 MG: 5 INJECTION, SOLUTION INTRAVENOUS at 11:46

## 2018-04-28 RX ADMIN — METOPROLOL TARTRATE 5 MG: 5 INJECTION, SOLUTION INTRAVENOUS at 00:05

## 2018-04-28 RX ADMIN — POTASSIUM CHLORIDE 20 MEQ: 400 INJECTION, SOLUTION INTRAVENOUS at 06:43

## 2018-04-28 RX ADMIN — ACETYLCYSTEINE 200 MG: 200 SOLUTION ORAL; RESPIRATORY (INHALATION) at 17:05

## 2018-04-28 RX ADMIN — METOPROLOL TARTRATE 5 MG: 5 INJECTION, SOLUTION INTRAVENOUS at 17:43

## 2018-04-28 RX ADMIN — BUDESONIDE 500 MCG: 0.5 INHALANT RESPIRATORY (INHALATION) at 19:25

## 2018-04-28 RX ADMIN — IPRATROPIUM BROMIDE AND ALBUTEROL SULFATE 3 ML: .5; 3 SOLUTION RESPIRATORY (INHALATION) at 07:50

## 2018-04-28 RX ADMIN — IPRATROPIUM BROMIDE AND ALBUTEROL SULFATE 3 ML: .5; 3 SOLUTION RESPIRATORY (INHALATION) at 17:05

## 2018-04-28 NOTE — PROGRESS NOTES
Cardiology Progress Note            Admit Date: 4/16/2018  Admit Diagnosis: SOB (shortness of breath);colon; Anemia;COLON  Date: 4/28/2018     Time: 2:01 PM    HPI: 82 y.o. Female with new diagnosis of CHF. Presented with chief c/o of SOB, found to have pulmonary edema and EF 40% on TTE. Noted to have NSTEMI and JEROD on CKD. Pt with hx of colon cancer and now concern for metastatic breast CA. Was in ICU over weekend (4/21-4/22) for respiratory failure, sepsis, psa UTI. Improved and transferred to floor 4/24. LHC on hold as probable metastatic disease. Interval hx: Transferred to ICU on 4/23 due to AMS and SOB. Started on nitroglycerine IV for BP control. Left lung opacification on a.m. Chest XRay 4/25/18 - mucous plug, had therapeutic bronch. Repeat CXR 4/26 with opacification of Left lung noted- therapeutic bronch repeated. Feeling better this AM.  Less short breath. Had significant negative balance     Assessment and Plan     1. NSTEMI:     -Remains with no chest pain. Medical mgmt as able given other issues. -12 lead EKG 4/24, marked ST abnormality.   -Repeat TTE:4/27/18  EF unchanged 45% with mild hypokinesis  basal-mid anteroseptal and apical walls.   -Held ASA and Plavix on 4/26 d/t worsened anemia. (hgb 6.7)- hgb now 10 s/p 1 unit PRBC    -Coreg to 12.5 mg BID. (HR/BP elevated this a.m.)   -Statin intolerant due to weakness on zocor, on repatha, last LDL 31, so will not rechallenge statin     2. Cardiomyopathy/Acute HFrEF, new:  EF 45% NYHA IV     -Repeat TTE 4/26 EF unchanged 45%. -CXR with persistent congestive changes, basilar airspace disease and sm arjun pleural effusions.  Improved JM aeration.    -was on Bumex 2 mg IV q 12 hours; changed to aldactone for now with severe hypokalemia   -Holding ARB/ACE-I d/t elevated creatinine   -coreg to 12.5 mg BID    -Daily weights, I/Os (net negative 3600/24 hours)       3. JEROD on CKD:   Creatinine improved to 1.42 (GFR 36)    -Nephrology following: has large calcified atherosclerotic lesion of Left renal artery ostium, CT was done without contrast to determine the severity of SONALI   -diuretics per nephrology. 4. Acute respiratory failure: required bipap overnight.   -diuretics as above   -Pulmonary following- prn ativan for anxiety. 5. Anemia: hgb improved to 10.3 s/p 1 unit PRBC. Also on procrit     7. HTN- BP  elevated   -Coreg increased to 12.5 mg BID   -Hydralazine prn    8. Leukocytosis/ pseudomonal UTI: ID on consult     9. Hx of Colon cancer, now suspected metastatic breast ca (bone, liver,calvarium mets)  -Hematology oncology following. 10  Hypokalemia/Hypomagnesemia:  K=2.9  -Repleted. 11. Advanced directives:  Partial code- no CPR, no shocks. Palliative care following. Breathing improved. TTE mildly improved, EF (45%) on repeat TTE     Code status- no shocks or CPR but still ok with intubation if needed. Subjective:   Sandy Ledesma denies chest pain. Breathing improved and mild abdominal pain. Objective:      Physical Exam:                Visit Vitals    /54 (BP 1 Location: Left arm, BP Patient Position: At rest)    Pulse 98    Temp 98.6 °F (37 °C)    Resp 27    Ht 5' 1\" (1.549 m)    Wt 125 lb 10.6 oz (57 kg)    SpO2 100%    BMI 23.74 kg/m2          General Appearance:   elderly female, appears weak, mildly tachypneic   Ears/Nose/Mouth/Throat:    Hearing grossly normal.         Neck:  Supple. Chest:     Course breath sounds bilaterally, + mild SOB     Cardiovascular:   Regular rate and rhythm, S1, S2 normal, no murmur,    Abdomen:    Soft, mild ttp, no gaurding. Extremities:  No edema bilaterally. Skin:  Warm and dry. Telemetry: Sinus rhythm- sinus tachycardia.            Data Review:    Labs:    Recent Results (from the past 24 hour(s))   GLUCOSE, POC    Collection Time: 04/27/18  5:31 PM   Result Value Ref Range    Glucose (POC) 123 (H) 65 - 100 mg/dL    Performed by Robin Urias    GLUCOSE, POC    Collection Time: 04/27/18 10:28 PM   Result Value Ref Range    Glucose (POC) 118 (H) 65 - 100 mg/dL    Performed by Alvarado Kendall    Collection Time: 04/28/18  4:16 AM   Result Value Ref Range    Magnesium 1.9 1.6 - 2.4 mg/dL   CBC WITH AUTOMATED DIFF    Collection Time: 04/28/18  4:16 AM   Result Value Ref Range    WBC 17.5 (H) 3.6 - 11.0 K/uL    RBC 3.75 (L) 3.80 - 5.20 M/uL    HGB 11.0 (L) 11.5 - 16.0 g/dL    HCT 34.5 (L) 35.0 - 47.0 %    MCV 92.0 80.0 - 99.0 FL    MCH 29.3 26.0 - 34.0 PG    MCHC 31.9 30.0 - 36.5 g/dL    RDW 23.5 (H) 11.5 - 14.5 %    PLATELET 634 534 - 793 K/uL    MPV 10.0 8.9 - 12.9 FL    NRBC 1.4 (H) 0  WBC    ABSOLUTE NRBC 0.24 (H) 0.00 - 0.01 K/uL    NEUTROPHILS 87 (H) 32 - 75 %    LYMPHOCYTES 4 (L) 12 - 49 %    MONOCYTES 7 5 - 13 %    EOSINOPHILS 0 0 - 7 %    BASOPHILS 0 0 - 1 %    MYELOCYTES 1 (H) 0 %    IMMATURE GRANULOCYTES 1 %    ABS. NEUTROPHILS 15.2 (H) 1.8 - 8.0 K/UL    ABS. LYMPHOCYTES 0.7 (L) 0.8 - 3.5 K/UL    ABS. MONOCYTES 1.2 (H) 0.0 - 1.0 K/UL    ABS. EOSINOPHILS 0.0 0.0 - 0.4 K/UL    ABS. BASOPHILS 0.0 0.0 - 0.1 K/UL    ABS. IMM.  GRANS. 0.2 K/UL    DF MANUAL      RBC COMMENTS ANISOCYTOSIS  2+        RBC COMMENTS OVALOCYTES  PRESENT        RBC COMMENTS POLYCHROMASIA  1+        RBC COMMENTS TARGET CELLS  PRESENT        RBC COMMENTS HYPOCHROMIA  1+       METABOLIC PANEL, BASIC    Collection Time: 04/28/18  4:16 AM   Result Value Ref Range    Sodium 148 (H) 136 - 145 mmol/L    Potassium 2.4 (LL) 3.5 - 5.1 mmol/L    Chloride 104 97 - 108 mmol/L    CO2 33 (H) 21 - 32 mmol/L    Anion gap 11 5 - 15 mmol/L    Glucose 147 (H) 65 - 100 mg/dL    BUN 48 (H) 6 - 20 MG/DL    Creatinine 1.29 (H) 0.55 - 1.02 MG/DL    BUN/Creatinine ratio 37 (H) 12 - 20      GFR est AA 48 (L) >60 ml/min/1.73m2    GFR est non-AA 40 (L) >60 ml/min/1.73m2    Calcium 9.3 8.5 - 10.1 MG/DL   GLUCOSE, POC Collection Time: 04/28/18  6:42 AM   Result Value Ref Range    Glucose (POC) 148 (H) 65 - 100 mg/dL    Performed by Bong Forman POC    Collection Time: 04/28/18 11:32 AM   Result Value Ref Range    Glucose (POC) 167 (H) 65 - 100 mg/dL    Performed by Mark France           Radiology:        Current Facility-Administered Medications   Medication Dose Route Frequency    potassium chloride (KAON 10%) 20 mEq/15 mL oral liquid 30 mEq  30 mEq Oral BID WITH MEALS    spironolactone (ALDACTONE) tablet 25 mg  25 mg Oral DAILY    hydrALAZINE (APRESOLINE) tablet 25 mg  25 mg Oral TID    levoFLOXacin (LEVAQUIN) 750 mg in D5W IVPB  750 mg IntraVENous Q48H    metoprolol (LOPRESSOR) injection 5 mg  5 mg IntraVENous Q6H    sodium chloride (NS) flush 5-10 mL  5-10 mL IntraVENous Q8H    sodium chloride (NS) flush 5-10 mL  5-10 mL IntraVENous PRN    albuterol-ipratropium (DUO-NEB) 2.5 MG-0.5 MG/3 ML  3 mL Nebulization Q4H RT    budesonide (PULMICORT) 500 mcg/2 ml nebulizer suspension  500 mcg Nebulization BID RT    acetylcysteine (MUCOMYST) 200 mg/mL (20 %) solution 200 mg  200 mg Nebulization QID RT    hydrALAZINE (APRESOLINE) 20 mg/mL injection 10 mg  10 mg IntraVENous Q6H PRN    fluconazole in 0.9% NaCl 100 mg IVPB  100 mg IntraVENous Q24H    0.9% sodium chloride infusion 250 mL  250 mL IntraVENous PRN    acetaminophen (TYLENOL) tablet 650 mg  650 mg Oral Q4H PRN    sodium chloride (NS) flush 20 mL  20 mL InterCATHeter PRN    sodium chloride (NS) flush 10 mL  10 mL InterCATHeter Q24H    sodium chloride (NS) flush 10 mL  10 mL InterCATHeter PRN    sodium chloride (NS) flush 10 mL  10 mL InterCATHeter Q8H    alteplase (CATHFLO) 1 mg in sterile water (preservative free) 1 mL injection  1 mg InterCATHeter PRN    bacitracin 500 unit/gram packet 1 Packet  1 Packet Topical PRN    meropenem (MERREM) 500 mg in 0.9% sodium chloride (MBP/ADV) 50 mL  0.5 g IntraVENous Q12H    sodium chloride (NS) flush 10-30 mL  10-30 mL InterCATHeter PRN    sodium chloride (NS) flush 10 mL  10 mL InterCATHeter Q24H    sodium chloride (NS) flush 10 mL  10 mL InterCATHeter PRN    sodium chloride (NS) flush 10-40 mL  10-40 mL InterCATHeter Q8H    sodium chloride (NS) flush 20 mL  20 mL InterCATHeter Q24H    anastrozole (ARIMIDEX) tablet 1 mg  1 mg Oral DAILY    0.9% sodium chloride infusion 250 mL  250 mL IntraVENous PRN    epoetin celio (EPOGEN;PROCRIT) injection 10,000 Units  10,000 Units SubCUTAneous Q MON, WED & FRI    albuterol-ipratropium (DUO-NEB) 2.5 MG-0.5 MG/3 ML  3 mL Nebulization Q6H PRN    glucose chewable tablet 16 g  4 Tab Oral PRN    dextrose (D50W) injection syrg 12.5-25 g  12.5-25 g IntraVENous PRN    glucagon (GLUCAGEN) injection 1 mg  1 mg IntraMUSCular PRN    insulin lispro (HUMALOG) injection   SubCUTAneous AC&HS    prochlorperazine (COMPAZINE) with saline injection 5 mg  5 mg IntraVENous Q8H PRN    ascorbic acid (vitamin C) (VITAMIN C) tablet 500 mg  500 mg Oral DAILY    cholecalciferol (VITAMIN D3) tablet 1,000 Units  1,000 Units Oral DAILY    folic acid (FOLVITE) tablet 1 mg  1 mg Oral DAILY    magnesium oxide (MAG-OX) tablet 400 mg  400 mg Oral DAILY    therapeutic multivitamin (THERAGRAN) tablet 1 Tab  1 Tab Oral DAILY    fish oil-omega-3 fatty acids 340-1,000 mg capsule 1 Cap  1 Cap Oral DAILY    sodium chloride (NS) flush 5-10 mL  5-10 mL IntraVENous Q8H    sodium chloride (NS) flush 5-10 mL  5-10 mL IntraVENous PRN    ondansetron (ZOFRAN) injection 4 mg  4 mg IntraVENous Q4H PRN    levothyroxine (SYNTHROID) tablet 88 mcg  88 mcg Oral ACB     Pt critically ill, poor prognosis, multiple severe issues.        Agus Webb MD     [facility] [address] [place] [address] [address] [facility] [address]   [place]   [phone]

## 2018-04-28 NOTE — PROGRESS NOTES
Hospitalist Progress Note  Chelsey Christian MD  Answering service: [phone] OR 36 from in house phone  Cell: [phone]      Date of Service:  2018  NAME:  Ninfa Tolliver  :  1935  MRN:  [id number]      Admission Summary:   The patient is an 74-year-old female with history of diabetes and rheumatoid arthritis who presented to the emergency room via EMS with complaints of shortness of breath    Interval history / Subjective:   More lethargic, on BiPAP     Assessment & Plan:     Acute on chronic hypoxic/hypercarbic respiratory failure due to pulmonary edema, pneumonia  -off BiPAP, now on high flow, duo neb, mucomyst, IV antibiotics,  monitor pulse ox  -repeated chest x ray  there is pulmonary vascular prominence, bilateral interstitial edema, bibasilar airspace disease and posterior layering bilateral pleural effusions, unchanged.   -s/p bronchoscopy on  and on  suctioned and cleared  -respiratory culture grow on  and  grow candida albicans, on diflucan  -bumex is stopped on   -intensivist on board    Acute encephalopathy possible due to metabolic  -RRT was called for restless and agitation early this morning, patient become more confused, disoriented and lethargic, wakeful to voice, non verbal but follows simple command  -CT head on  volume loss and minimal white matter disease. No acute intracranial [place], Sinus mucosal inflammatory change  -MRI of brain  Study limited by motion artifact, volume loss and white matter disease. No definite intracranial metastasis however no intravenous contrast was administered due to poor renal function.  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  -lethargic, continue neuro check and supportive care  -neurologist onboard    Sepsis secondary to E Coli UTI, pneumonia  POA  -on zosyn, vancomycin, meropenem, and fluconazole   -urine cx   grow e coli, pseudomanas sp  -blood cx on 4/18, 4/19 and 4/22 no growth    NSTEMI  -on fish oil  -aspirin and plavix held due to low Hgb  -cardiologist on board    Acute systolic CHF NYHA Class IV  -bumex is stopped, on hydralazine, started on spironolactone 4/28  -not on BB or ACEi/ ARB due to hypotension and renal insufficiency   -monitor I/O and daily weight    JEROD on CKD stage III  -creatinine improving  -bumex is stopped on 4/28  -monitor renal function, urine output  -nephrologist on board    Possible metastatic left breast cancer  -on armidex   -Hem/Onc on board    HTN  -now on hydralazine, monitor BP    DM-II  -continue insulin sliding scale, monitor finger stick glucose    Hypothyroidism  -continue synthroid    Hx of GERD  -continue pepcid    Elevated liver enzyme   -possible related to liver lesion, monitor LFT    Anemia multifactorial  -Hgb 6.7, s/p 2 units pRBC on 4/26, repeated Hgb 11, monitor cbc    Hx of colon cancer 25 years ago  -according to son, there is a work-up planned with [facility] regarding the possibility of recurrent CA (based on lesions seen in the calvarium on MRI - 3/19). -hem/onc on board    Code status: DNR    Code status: DNR  DVT prophylaxis:SCD    Care Plan discussed with: Patient/Family and Nurse  Disposition: Transfer to ICU      Case discussed with her son, Maria Del Carmen Zhou , on 4/27 questions answered     Hospital Problems  Date Reviewed: 4/16/2018          Codes Class Noted POA    Acute systolic heart failure ([facility] [address] ICD-10-CM: I50.21  ICD-9-CM: 428.21  4/24/2018 Unknown        Altered mental status, unspecified ICD-10-CM: R41.82  ICD-9-CM: 780.97  4/23/2018 Unknown        * (Principal)SOB (shortness of breath) ICD-10-CM: R06.02  ICD-9-CM: 786.05  4/16/2018 Unknown                Vital Signs:    Last 24hrs VS reviewed since prior progress note.  Most recent are:  Visit Vitals    /60    Pulse 100    Temp 99.6 °F (37.6 °C)    Resp 25    Ht 5' 1\" (1.549 m)    Wt 57 kg (125 lb 10.6 oz)    SpO2 98%    BMI 23.74 kg/m2         Intake/Output Summary (Last 24 hours) at 04/28/18 1145  Last data filed at 04/28/18 1000   Gross per 24 hour   Intake              270 ml   Output             2705 ml   Net            -2435 ml        Physical Examination:             Constitutional:  On high flow oxygen, not in distress, cooperative, pleasant    ENT:  Oral mucous moist, oropharynx benign. Neck supple,    Resp:  Decrease bronchial breath sound, few wheezing and rhonic bilaterally. No accessory muscle use   CV:  Regular rhythm, normal rate, no murmurs, gallops, rubs    GI:  Soft, non distended, non tender. normoactive bowel sounds, no hepatosplenomegaly     Musculoskeletal:  pedal edema    Neurologic:  Lethargic, wakeful to touch     Skin:  Good turgor, no rashes or ulcers       Data Review:    Review and/or order of clinical lab test      Labs:     Recent Labs      04/28/18   0416  04/27/18   0714   WBC  17.5*  18.8*   HGB  11.0*  10.3*   HCT  34.5*  32.2*   PLT  320  284     Recent Labs      04/28/18   0416  04/27/18   0714  04/26/18   0348   NA  148*  144  144   K  2.4*  2.5*  3.3*   CL  104  102  108   CO2  33*  32  27   BUN  48*  51*  63*   CREA  1.29*  1.42*  1.83*   GLU  147*  153*  112*   CA  9.3  9.3  9.3   MG  1.9  1.9  1.5*   PHOS   --   3.0  3.5     Recent Labs      04/27/18   0714   ALB  2.7*     No results for input(s): INR, PTP, APTT in the last 72 hours. No lab exists for component: INREXT, INREXT   No results for input(s): FE, TIBC, PSAT, FERR in the last 72 hours. No results found for: FOL, RBCF   No results for input(s): PH, PCO2, PO2 in the last 72 hours. No results for input(s): CPK, CKNDX, TROIQ in the last 72 hours.     No lab exists for component: CPKMB  Lab Results   Component Value Date/Time    Cholesterol, total 74 04/16/2018 04:21 AM    HDL Cholesterol 25 04/16/2018 04:21 AM    LDL, calculated 31.6 04/16/2018 04:21 AM    Triglyceride 87 04/16/2018 04:21 AM    CHOL/HDL Ratio 3.0 04/16/2018 04:21 AM Lab Results   Component Value Date/Time    Glucose (POC) 167 (H) 04/28/2018 11:32 AM    Glucose (POC) 148 (H) 04/28/2018 06:42 AM    Glucose (POC) 118 (H) 04/27/2018 10:28 PM    Glucose (POC) 123 (H) 04/27/2018 05:31 PM    Glucose (POC) 150 (H) 04/27/2018 12:44 PM     Lab Results   Component Value Date/Time    Color YELLOW/STRAW 04/20/2018 04:30 AM    Appearance TURBID (A) 04/20/2018 04:30 AM    Specific gravity 1.029 04/20/2018 04:30 AM    pH (UA) 5.0 04/20/2018 04:30 AM    Protein 100 (A) 04/20/2018 04:30 AM    Glucose NEGATIVE  04/20/2018 04:30 AM    Ketone NEGATIVE  04/20/2018 04:30 AM    Bilirubin NEGATIVE  04/20/2018 04:30 AM    Urobilinogen 0.2 04/20/2018 04:30 AM    Nitrites NEGATIVE  04/20/2018 04:30 AM    Leukocyte Esterase LARGE (A) 04/20/2018 04:30 AM    Epithelial cells FEW 04/20/2018 04:30 AM    Bacteria 3+ (A) 04/20/2018 04:30 AM    WBC >100 (H) 04/20/2018 04:30 AM    RBC 5-10 04/20/2018 04:30 AM         Medications Reviewed:     Current Facility-Administered Medications   Medication Dose Route Frequency    potassium chloride 20 mEq in 50 ml IVPB  20 mEq IntraVENous Q1H    potassium chloride (KAON 10%) 20 mEq/15 mL oral liquid 30 mEq  30 mEq Oral BID WITH MEALS    spironolactone (ALDACTONE) tablet 25 mg  25 mg Oral DAILY    hydrALAZINE (APRESOLINE) tablet 25 mg  25 mg Oral TID    levoFLOXacin (LEVAQUIN) 750 mg in D5W IVPB  750 mg IntraVENous Q48H    metoprolol (LOPRESSOR) injection 5 mg  5 mg IntraVENous Q6H    sodium chloride (NS) flush 5-10 mL  5-10 mL IntraVENous Q8H    sodium chloride (NS) flush 5-10 mL  5-10 mL IntraVENous PRN    albuterol-ipratropium (DUO-NEB) 2.5 MG-0.5 MG/3 ML  3 mL Nebulization Q4H RT    budesonide (PULMICORT) 500 mcg/2 ml nebulizer suspension  500 mcg Nebulization BID RT    acetylcysteine (MUCOMYST) 200 mg/mL (20 %) solution 200 mg  200 mg Nebulization QID RT    hydrALAZINE (APRESOLINE) 20 mg/mL injection 10 mg  10 mg IntraVENous Q6H PRN    fluconazole in 0.9% NaCl 100 mg IVPB  100 mg IntraVENous Q24H    0.9% sodium chloride infusion 250 mL  250 mL IntraVENous PRN    acetaminophen (TYLENOL) tablet 650 mg  650 mg Oral Q4H PRN    sodium chloride (NS) flush 20 mL  20 mL InterCATHeter PRN    sodium chloride (NS) flush 10 mL  10 mL InterCATHeter Q24H    sodium chloride (NS) flush 10 mL  10 mL InterCATHeter PRN    sodium chloride (NS) flush 10 mL  10 mL InterCATHeter Q8H    alteplase (CATHFLO) 1 mg in sterile water (preservative free) 1 mL injection  1 mg InterCATHeter PRN    bacitracin 500 unit/gram packet 1 Packet  1 Packet Topical PRN    meropenem (MERREM) 500 mg in 0.9% sodium chloride (MBP/ADV) 50 mL  0.5 g IntraVENous Q12H    sodium chloride (NS) flush 10-30 mL  10-30 mL InterCATHeter PRN    sodium chloride (NS) flush 10 mL  10 mL InterCATHeter Q24H    sodium chloride (NS) flush 10 mL  10 mL InterCATHeter PRN    sodium chloride (NS) flush 10-40 mL  10-40 mL InterCATHeter Q8H    sodium chloride (NS) flush 20 mL  20 mL InterCATHeter Q24H    anastrozole (ARIMIDEX) tablet 1 mg  1 mg Oral DAILY    0.9% sodium chloride infusion 250 mL  250 mL IntraVENous PRN    epoetin celio (EPOGEN;PROCRIT) injection 10,000 Units  10,000 Units SubCUTAneous Q MON, WED & FRI    albuterol-ipratropium (DUO-NEB) 2.5 MG-0.5 MG/3 ML  3 mL Nebulization Q6H PRN    glucose chewable tablet 16 g  4 Tab Oral PRN    dextrose (D50W) injection syrg 12.5-25 g  12.5-25 g IntraVENous PRN    glucagon (GLUCAGEN) injection 1 mg  1 mg IntraMUSCular PRN    insulin lispro (HUMALOG) injection   SubCUTAneous AC&HS    prochlorperazine (COMPAZINE) with saline injection 5 mg  5 mg IntraVENous Q8H PRN    ascorbic acid (vitamin C) (VITAMIN C) tablet 500 mg  500 mg Oral DAILY    cholecalciferol (VITAMIN D3) tablet 1,000 Units  1,000 Units Oral DAILY    folic acid (FOLVITE) tablet 1 mg  1 mg Oral DAILY    magnesium oxide (MAG-OX) tablet 400 mg  400 mg Oral DAILY    therapeutic multivitamin [facility]) tablet 1 Tab  1 Tab Oral DAILY    fish oil-omega-3 fatty acids 340-1,000 mg capsule 1 Cap  1 Cap Oral DAILY    sodium chloride (NS) flush 5-10 mL  5-10 mL IntraVENous Q8H    sodium chloride (NS) flush 5-10 mL  5-10 mL IntraVENous PRN    ondansetron (ZOFRAN) injection 4 mg  4 mg IntraVENous Q4H PRN    levothyroxine (SYNTHROID) tablet 88 mcg  88 mcg Oral ACB     ______________________________________________________________________  EXPECTED LENGTH OF STAY: 4d 12h  ACTUAL LENGTH OF STAY:          12                 Charlene Esqueda MD

## 2018-04-28 NOTE — PROGRESS NOTES
[facility]   [address] [address], [place], [facility] [address], [facility]  Phone: [phone]   [phone]       Nephrology Progress Note  Patric Field     1935     [id number]  Date of Admission : 4/16/2018 04/28/18    CC: Follow up for JEROD        Assessment and Plan   JEROD on CKD   - JEROD is multifactorial   - resolving   - holding bumex due to severe hypokalemia   - ordered aldactone     Severe Hypokalemia :  - replete aggresively and recheck in pm     Left Lower Pole Renal Calculus :  - likely source of UTI and Pyelo like picture   - sepsis better now and urine had Pan sensitive E coli and Pseudomonas     Labile HTN:  - she has large calcified atherosclerotic lesion of Left renal artery ostium, CT was done without contrast to determine the severity of SONALI  - conservative management w/ meds  - continue current meds     Acute Resp Failure:  - bronch 4/25 and 4/26  - per PCCM     Sepsis  -off pressors     CKD Stage III :  - baseline Cr 0.9-1 mg/dl lately   - progressing, Cr around 1.5 to 1.7 here     NSTEMI :  - Echo shows EF 35-45%, mod LVH, Severe Hypokinesis of apex and moderate Hypokinesis of anteroseptal wall     Acute Systolic CHF: 2/2 MI   - avoid  ACE/ARB for now     Chronic Pontine Infarcts      Metastatic Breast Ca w/ Calvarial and Bone mets   - s/p breast Bx      - hx of colon Ca     Anemia :  - per heme/onc     Interval History:  Awake and alert - more than yesterday   Following all commands   On high flow 02   Cr better  Good diuresis  Electrolytes noted       Review of Systems: Review of systems not obtained due to patient factors.     Current Medications:   Current Facility-Administered Medications   Medication Dose Route Frequency    potassium chloride 20 mEq in 50 ml IVPB  20 mEq IntraVENous Q1H    potassium chloride (KAON 10%) 20 mEq/15 mL oral liquid 30 mEq  30 mEq Oral BID WITH MEALS    spironolactone (ALDACTONE) tablet 25 mg  25 mg Oral DAILY    hydrALAZINE (APRESOLINE) tablet 25 mg  25 mg Oral TID    levoFLOXacin (LEVAQUIN) 750 mg in D5W IVPB  750 mg IntraVENous Q48H    metoprolol (LOPRESSOR) injection 5 mg  5 mg IntraVENous Q6H    sodium chloride (NS) flush 5-10 mL  5-10 mL IntraVENous Q8H    sodium chloride (NS) flush 5-10 mL  5-10 mL IntraVENous PRN    albuterol-ipratropium (DUO-NEB) 2.5 MG-0.5 MG/3 ML  3 mL Nebulization Q4H RT    budesonide (PULMICORT) 500 mcg/2 ml nebulizer suspension  500 mcg Nebulization BID RT    acetylcysteine (MUCOMYST) 200 mg/mL (20 %) solution 200 mg  200 mg Nebulization QID RT    hydrALAZINE (APRESOLINE) 20 mg/mL injection 10 mg  10 mg IntraVENous Q6H PRN    fluconazole in 0.9% NaCl 100 mg IVPB  100 mg IntraVENous Q24H    0.9% sodium chloride infusion 250 mL  250 mL IntraVENous PRN    acetaminophen (TYLENOL) tablet 650 mg  650 mg Oral Q4H PRN    sodium chloride (NS) flush 20 mL  20 mL InterCATHeter PRN    sodium chloride (NS) flush 10 mL  10 mL InterCATHeter Q24H    sodium chloride (NS) flush 10 mL  10 mL InterCATHeter PRN    sodium chloride (NS) flush 10 mL  10 mL InterCATHeter Q8H    alteplase (CATHFLO) 1 mg in sterile water (preservative free) 1 mL injection  1 mg InterCATHeter PRN    bacitracin 500 unit/gram packet 1 Packet  1 Packet Topical PRN    meropenem (MERREM) 500 mg in 0.9% sodium chloride (MBP/ADV) 50 mL  0.5 g IntraVENous Q12H    sodium chloride (NS) flush 10-30 mL  10-30 mL InterCATHeter PRN    sodium chloride (NS) flush 10 mL  10 mL InterCATHeter Q24H    sodium chloride (NS) flush 10 mL  10 mL InterCATHeter PRN    sodium chloride (NS) flush 10-40 mL  10-40 mL InterCATHeter Q8H    sodium chloride (NS) flush 20 mL  20 mL InterCATHeter Q24H    anastrozole (ARIMIDEX) tablet 1 mg  1 mg Oral DAILY    0.9% sodium chloride infusion 250 mL  250 mL IntraVENous PRN    epoetin celio (EPOGEN;PROCRIT) injection 10,000 Units  10,000 Units SubCUTAneous Q MON, WED & FRI    albuterol-ipratropium (DUO-NEB) 2.5 MG-0.5 MG/3 ML  3 mL Nebulization Q6H PRN    glucose chewable tablet 16 g  4 Tab Oral PRN    dextrose (D50W) injection syrg 12.5-25 g  12.5-25 g IntraVENous PRN    glucagon (GLUCAGEN) injection 1 mg  1 mg IntraMUSCular PRN    insulin lispro (HUMALOG) injection   SubCUTAneous AC&HS    prochlorperazine (COMPAZINE) with saline injection 5 mg  5 mg IntraVENous Q8H PRN    ascorbic acid (vitamin C) (VITAMIN C) tablet 500 mg  500 mg Oral DAILY    cholecalciferol (VITAMIN D3) tablet 1,000 Units  1,000 Units Oral DAILY    folic acid (FOLVITE) tablet 1 mg  1 mg Oral DAILY    magnesium oxide (MAG-OX) tablet 400 mg  400 mg Oral DAILY    therapeutic multivitamin (THERAGRAN) tablet 1 Tab  1 Tab Oral DAILY    fish oil-omega-3 fatty acids 340-1,000 mg capsule 1 Cap  1 Cap Oral DAILY    sodium chloride (NS) flush 5-10 mL  5-10 mL IntraVENous Q8H    sodium chloride (NS) flush 5-10 mL  5-10 mL IntraVENous PRN    ondansetron (ZOFRAN) injection 4 mg  4 mg IntraVENous Q4H PRN    levothyroxine (SYNTHROID) tablet 88 mcg  88 mcg Oral ACB      Allergies   Allergen Reactions    Statins-Hmg-Coa Reductase Inhibitors Other (comments)     Intolerant to statins     Sulfa (Sulfonamide Antibiotics) Other (comments)     syncope       Objective:  Vitals:    Vitals:    04/28/18 0400 04/28/18 0500 04/28/18 0600 04/28/18 0700   BP: 154/68 157/57 162/59 156/59   Pulse: (!) 108 98 (!) 105 90   Resp: 28 23 26 23   Temp: 98.1 °F (36.7 °C)      SpO2: 93% 99% 99% 99%   Weight: 57 kg (125 lb 10.6 oz)      Height:         Intake and Output:     04/26 1901 - 04/28 0700  In: 150 [I.V.:150]  Out: 4765 [Urine:4765]    Physical Examination:    General: Lethargic, on high flow   Neck:  Supple, no mass  Resp:  Diminished at bases   CV:  RRR, no murmur  GI:  Soft, NT, + BS  Neurologic:  Non focal     []    High complexity decision making was performed  []    Patient is at high-risk of decompensation with multiple organ involvement    Lab Data Personally Reviewed: I have reviewed all the pertinent labs, microbiology data and radiology studies during assessment. Recent Labs      04/28/18 0416 04/27/18 0714 04/26/18 0348 04/25/18   1842   NA  148*  144  144  144   K  2.4*  2.5*  3.3*  3.4*   CL  104  102  108  108   CO2  33*  32  27  29   GLU  147*  153*  112*  123*   BUN  48*  51*  63*  60*   CREA  1.29*  1.42*  1.83*  1.83*   CA  9.3  9.3  9.3  9.1   MG  1.9  1.9  1.5*   --    PHOS   --   3.0  3.5   --    ALB   --   2.7*   --    --      Recent Labs      04/28/18 0416 04/27/18 0714 04/26/18 0348   WBC  17.5*  18.8*  15.5*   HGB  11.0*  10.3*  6.7*   HCT  34.5*  32.2*  21.4*   PLT  320  284  333     No results found for: SDES  Lab Results   Component Value Date/Time    Culture result: LIGHT APPARENT CANDIDA ALBICANS ONLY (A) 04/26/2018 09:10 AM    Culture result: SO FAR 04/26/2018 09:10 AM    Culture result: MODERATE APPARENT CANDIDA ALBICANS (A) 04/25/2018 09:40 AM    Culture result: RARE NORMAL RESPIRATORY BERYL 04/25/2018 09:40 AM    Culture result: NO GROWTH 1 DAY 04/22/2018 07:35 AM    Culture result: NO GROWTH 5 DAYS 04/22/2018 07:35 AM     Recent Results (from the past 24 hour(s))   POC G3 - PUL    Collection Time: 04/27/18 12:07 PM   Result Value Ref Range    FIO2 (POC) 30 %    pH (POC) 7.434 7.35 - 7.45      pCO2 (POC) 56.7 (H) 35.0 - 45.0 MMHG    pO2 (POC) 111 (H) 80 - 100 MMHG    HCO3 (POC) 38.0 (H) 22 - 26 MMOL/L    sO2 (POC) 98 (H) 92 - 97 %    Base excess (POC) 14 mmol/L    Site LEFT BRACHIAL      Device: BIPAP      PEEP/CPAP (POC) 6 cmH2O    PIP (POC) 14      Allens test (POC) YES      Specimen type (POC) ARTERIAL      Total resp.  rate 20     GLUCOSE, POC    Collection Time: 04/27/18 12:44 PM   Result Value Ref Range    Glucose (POC) 150 (H) 65 - 100 mg/dL    Performed by Aisha Clarity, POC    Collection Time: 04/27/18  5:31 PM   Result Value Ref Range    Glucose (POC) 123 (H) 65 - 100 mg/dL    Performed by Aisha Clarity, POC Collection Time: 04/27/18 10:28 PM   Result Value Ref Range    Glucose (POC) 118 (H) 65 - 100 mg/dL    Performed by Kvng Garrido    MAGNESIUM    Collection Time: 04/28/18  4:16 AM   Result Value Ref Range    Magnesium 1.9 1.6 - 2.4 mg/dL   CBC WITH AUTOMATED DIFF    Collection Time: 04/28/18  4:16 AM   Result Value Ref Range    WBC 17.5 (H) 3.6 - 11.0 K/uL    RBC 3.75 (L) 3.80 - 5.20 M/uL    HGB 11.0 (L) 11.5 - 16.0 g/dL    HCT 34.5 (L) 35.0 - 47.0 %    MCV 92.0 80.0 - 99.0 FL    MCH 29.3 26.0 - 34.0 PG    MCHC 31.9 30.0 - 36.5 g/dL    RDW 23.5 (H) 11.5 - 14.5 %    PLATELET 650 314 - 090 K/uL    MPV 10.0 8.9 - 12.9 FL    NRBC 1.4 (H) 0  WBC    ABSOLUTE NRBC 0.24 (H) 0.00 - 0.01 K/uL    NEUTROPHILS 87 (H) 32 - 75 %    LYMPHOCYTES 4 (L) 12 - 49 %    MONOCYTES 7 5 - 13 %    EOSINOPHILS 0 0 - 7 %    BASOPHILS 0 0 - 1 %    MYELOCYTES 1 (H) 0 %    IMMATURE GRANULOCYTES 1 %    ABS. NEUTROPHILS 15.2 (H) 1.8 - 8.0 K/UL    ABS. LYMPHOCYTES 0.7 (L) 0.8 - 3.5 K/UL    ABS. MONOCYTES 1.2 (H) 0.0 - 1.0 K/UL    ABS. EOSINOPHILS 0.0 0.0 - 0.4 K/UL    ABS. BASOPHILS 0.0 0.0 - 0.1 K/UL    ABS. IMM.  GRANS. 0.2 K/UL    DF MANUAL      RBC COMMENTS ANISOCYTOSIS  2+        RBC COMMENTS OVALOCYTES  PRESENT        RBC COMMENTS POLYCHROMASIA  1+        RBC COMMENTS TARGET CELLS  PRESENT        RBC COMMENTS HYPOCHROMIA  1+       METABOLIC PANEL, BASIC    Collection Time: 04/28/18  4:16 AM   Result Value Ref Range    Sodium 148 (H) 136 - 145 mmol/L    Potassium 2.4 (LL) 3.5 - 5.1 mmol/L    Chloride 104 97 - 108 mmol/L    CO2 33 (H) 21 - 32 mmol/L    Anion gap 11 5 - 15 mmol/L    Glucose 147 (H) 65 - 100 mg/dL    BUN 48 (H) 6 - 20 MG/DL    Creatinine 1.29 (H) 0.55 - 1.02 MG/DL    BUN/Creatinine ratio 37 (H) 12 - 20      GFR est AA 48 (L) >60 ml/min/1.73m2    GFR est non-AA 40 (L) >60 ml/min/1.73m2    Calcium 9.3 8.5 - 10.1 MG/DL   GLUCOSE, POC    Collection Time: 04/28/18  6:42 AM   Result Value Ref Range    Glucose (POC) 148 (H) 65 - 100 mg/dL    Performed by Brendon Olsen        I have reviewed the flowsheets. Chart and Pertinent Notes have been reviewed. No change in PMH ,family and social history from Consult note.       Aleksander Harris MD

## 2018-04-28 NOTE — PROGRESS NOTES
I returned page from nurse who reported that patient's next of kin- her son wants to change patient's code status to DNR. I came to the ICU and spoke with patient's son- Priyanka Bee who confirmed his request to change patient's code status to DNR. I placed order on chart for the same.

## 2018-04-28 NOTE — PROGRESS NOTES
Bedside shift change report received from [facility]. Report included the following information SBAR, Kardex, Procedure Summary, Intake/Output, MAR and Recent Results. 2000: VSS, hi-yessica. Resting comfortably. 2002: MD paged regarding changing pt to DNR. 2035: No call back, 2nd page. 2036: Dr. Jeanette Finnegan to see family soon. 2100: Dr. Jeanette Finnegan on unit, speaking w/ family. Pink sheet signed. 0400: No changes. Shift Summary: Uneventful shift, VSS, hi flow all shift. Pt rested comfortably. DNR.

## 2018-04-28 NOTE — PROGRESS NOTES
Hematology-Oncology Progress Note    Trudy Haywood  1935  [id number]  4/28/2018    Follow-up for: met. Breast cancer     [x]        Chart notes since last visit reviewed   [x]        Medications reviewed for allergies and interactions       Case discussed with the following:         []                            []        Nursing Staff                                                                         []        Pathologist                                                                        [x]        FAMILY      Subjective:     Patient feeling better this am, no bleeding, breathing still labored. Son with several questions concerning her prognosis and Philip Smalls do we expect in the future. \" He wants her DNR    Objective:     Patient Vitals for the past 24 hrs:   BP Temp Pulse Resp SpO2 Weight   04/28/18 0800 151/59 99.6 °F (37.6 °C) 90 22 99 % -   04/28/18 0700 156/59 - 90 23 99 % -   04/28/18 0600 162/59 - (!) 105 26 99 % -   04/28/18 0500 157/57 - 98 23 99 % -   04/28/18 0400 154/68 98.1 °F (36.7 °C) (!) 108 28 93 % 57 kg (125 lb 10.6 oz)   04/28/18 0300 157/56 - 93 22 99 % -   04/28/18 0200 155/52 - 93 21 99 % -   04/28/18 0100 152/56 - 93 23 100 % -   04/28/18 0000 142/65 98.3 °F (36.8 °C) - - - -   04/27/18 2300 153/55 - 93 22 99 % -   04/27/18 2200 149/58 - 88 19 99 % -   04/27/18 2100 147/61 - 98 22 99 % -   04/27/18 2042 - 98.4 °F (36.9 °C) - - - -   04/27/18 2000 143/64 - 93 20 99 % -   04/27/18 1900 144/61 - 95 24 99 % -   04/27/18 1845 - - 95 24 99 % -   04/27/18 1830 148/55 - 94 24 99 % -   04/27/18 1800 145/62 - 96 25 100 % -   04/27/18 1730 140/67 - 92 24 100 % -   04/27/18 1713 - - - - 99 % -   04/27/18 1700 130/57 - 88 24 99 % -   04/27/18 1630 136/57 - 100 26 98 % -   04/27/18 1600 146/67 99.8 °F (37.7 °C) (!) 108 28 99 % -   04/27/18 1545 - - (!) 105 28 98 % -   04/27/18 1530 140/62 - (!) 110 30 98 % -   04/27/18 1500 149/72 - (!) 121 (!) 31 98 % -   04/27/18 1437 178/82 - - - - - 04/27/18 1430 178/82 - (!) 113 28 100 % -   04/27/18 1415 - - (!) 107 26 100 % -   04/27/18 1400 169/79 - (!) 111 27 98 % -   04/27/18 1345 - - (!) 108 28 97 % -   04/27/18 1330 163/75 - (!) 108 27 97 % -   04/27/18 1300 163/71 - (!) 101 26 99 % -   04/27/18 1239 149/61 - (!) 107 - - -   04/27/18 1230 149/61 - (!) 102 22 100 % -   04/27/18 1200 157/70 98.7 °F (37.1 °C) (!) 107 22 100 % -   04/27/18 1155 - - - - 100 % -   04/27/18 1130 155/65 - (!) 104 22 100 % -   04/27/18 1115 - - (!) 117 23 100 % -   04/27/18 1100 163/78 - (!) 118 29 100 % -   04/27/18 1030 162/80 - (!) 117 29 100 % -   04/27/18 1000 158/74 - (!) 117 27 99 % -   04/27/18 0930 159/73 - (!) 123 30 98 % -   04/27/18 0915 - - (!) 120 28 99 % -       REVIEW OF SYSTEMS:    Constitutional: negative fever, negative chills, negative weight loss  Eyes:   negative visual changes  ENT:   negative sore throat, tongue or lip swelling  Respiratory:  negative cough, negative dyspnea  Cards:  negative for chest pain, palpitations, lower extremity edema  GI:   negative for nausea, vomiting, diarrhea, and abdominal pain  Neuro:  negative for headaches, dizziness, vertigo  []                        Full ROS o/w normal/non contributor    Constitutional:  Patient looks  [x]        Sick  []        Frail  []        Better                                                 []        Depressed    HEENT:  [x]   NC                         []   AT               []    ALOPECIA           Eyes: [x]   Normal               []    Icteric  Oropharynx: []    Normal                  []  Thrush               []   Dry  Mucositis: []    None                 Grade: []        I  []        II  []        III  []        IV  Neck:   [x]   Supple                  []  Rigid      Breast.. Left upper outer quadrant mass            Lungs bilat. wheezing  Cor tachy  Abd.  Soft non tender  Ext, no edema  Skin:  Intact [x]           Purpura []        Rash: [x]   ABSENT       []  PRESENT  Neuro:  [] Normal  [x]        Confused lethargic    Available labs reviewed:  Labs:    Recent Results (from the past 24 hour(s))   POC G3 - PUL    Collection Time: 04/27/18 12:07 PM   Result Value Ref Range    FIO2 (POC) 30 %    pH (POC) 7.434 7.35 - 7.45      pCO2 (POC) 56.7 (H) 35.0 - 45.0 MMHG    pO2 (POC) 111 (H) 80 - 100 MMHG    HCO3 (POC) 38.0 (H) 22 - 26 MMOL/L    sO2 (POC) 98 (H) 92 - 97 %    Base excess (POC) 14 mmol/L    Site LEFT BRACHIAL      Device: BIPAP      PEEP/CPAP (POC) 6 cmH2O    PIP (POC) 14      Allens test (POC) YES      Specimen type (POC) ARTERIAL      Total resp. rate 20     GLUCOSE, POC    Collection Time: 04/27/18 12:44 PM   Result Value Ref Range    Glucose (POC) 150 (H) 65 - 100 mg/dL    Performed by Leslie Moreland    GLUCOSE, POC    Collection Time: 04/27/18  5:31 PM   Result Value Ref Range    Glucose (POC) 123 (H) 65 - 100 mg/dL    Performed by Leslie Moreland    GLUCOSE, POC    Collection Time: 04/27/18 10:28 PM   Result Value Ref Range    Glucose (POC) 118 (H) 65 - 100 mg/dL    Performed by Keke Rapp    MAGNESIUM    Collection Time: 04/28/18  4:16 AM   Result Value Ref Range    Magnesium 1.9 1.6 - 2.4 mg/dL   CBC WITH AUTOMATED DIFF    Collection Time: 04/28/18  4:16 AM   Result Value Ref Range    WBC 17.5 (H) 3.6 - 11.0 K/uL    RBC 3.75 (L) 3.80 - 5.20 M/uL    HGB 11.0 (L) 11.5 - 16.0 g/dL    HCT 34.5 (L) 35.0 - 47.0 %    MCV 92.0 80.0 - 99.0 FL    MCH 29.3 26.0 - 34.0 PG    MCHC 31.9 30.0 - 36.5 g/dL    RDW 23.5 (H) 11.5 - 14.5 %    PLATELET 541 060 - 221 K/uL    MPV 10.0 8.9 - 12.9 FL    NRBC 1.4 (H) 0  WBC    ABSOLUTE NRBC 0.24 (H) 0.00 - 0.01 K/uL    NEUTROPHILS 87 (H) 32 - 75 %    LYMPHOCYTES 4 (L) 12 - 49 %    MONOCYTES 7 5 - 13 %    EOSINOPHILS 0 0 - 7 %    BASOPHILS 0 0 - 1 %    MYELOCYTES 1 (H) 0 %    IMMATURE GRANULOCYTES 1 %    ABS. NEUTROPHILS 15.2 (H) 1.8 - 8.0 K/UL    ABS. LYMPHOCYTES 0.7 (L) 0.8 - 3.5 K/UL    ABS. MONOCYTES 1.2 (H) 0.0 - 1.0 K/UL    ABS.  EOSINOPHILS 0.0 0.0 - 0.4 K/UL    ABS. BASOPHILS 0.0 0.0 - 0.1 K/UL    ABS. IMM. GRANS. 0.2 K/UL    DF MANUAL      RBC COMMENTS ANISOCYTOSIS  2+        RBC COMMENTS OVALOCYTES  PRESENT        RBC COMMENTS POLYCHROMASIA  1+        RBC COMMENTS TARGET CELLS  PRESENT        RBC COMMENTS HYPOCHROMIA  1+       METABOLIC PANEL, BASIC    Collection Time: 04/28/18  4:16 AM   Result Value Ref Range    Sodium 148 (H) 136 - 145 mmol/L    Potassium 2.4 (LL) 3.5 - 5.1 mmol/L    Chloride 104 97 - 108 mmol/L    CO2 33 (H) 21 - 32 mmol/L    Anion gap 11 5 - 15 mmol/L    Glucose 147 (H) 65 - 100 mg/dL    BUN 48 (H) 6 - 20 MG/DL    Creatinine 1.29 (H) 0.55 - 1.02 MG/DL    BUN/Creatinine ratio 37 (H) 12 - 20      GFR est AA 48 (L) >60 ml/min/1.73m2    GFR est non-AA 40 (L) >60 ml/min/1.73m2    Calcium 9.3 8.5 - 10.1 MG/DL   GLUCOSE, POC    Collection Time: 04/28/18  6:42 AM   Result Value Ref Range    Glucose (POC) 148 (H) 65 - 100 mg/dL    Performed by Kash Double        Available Xrays reviewed:    Chemotherapy monitored and toxicities assessed:    Assessment and Plan   1. Probable metastatic breast cancer. The bone scan shows diffuse mets, the [id number] anc cea are elevated and the pt has a palpable left breast mass,,,she had a biopsy done in Ultrasound yesterday,,,suspect it will be ER + given the duration of mass in left breast ,   The biopsy did not have sufficient material for a diagnosis, Dr Florian Martin discussed with Dr Doris Watt and are sending wehat they have, will continue emperic Arimidex (hormonal AI therapy)    Discussed with son outside room who had several questions, tried to reassure him he will have to wait several weeks prior to seeing if therapy is helping her disease. He is aware this is not curable however. 2. Anemia. .. Pt has bone mets, suspect this is due to chronic disease +- marrow involvement,  started procrit weekly on 4/23,,, given one unit prbc 4/23, count up to 11 after blood given  (4/26)       3. CHF. Benna Him Per cardiology. Grover Ambrosio 4. AMS. . ,,,She has no evidence of brain mets on MRI ,but does have calvarial mets          Ty Ordonez MD

## 2018-04-29 LAB
ALBUMIN SERPL-MCNC: 2.4 G/DL (ref 3.5–5)
ANION GAP SERPL CALC-SCNC: 7 MMOL/L (ref 5–15)
BUN SERPL-MCNC: 43 MG/DL (ref 6–20)
BUN/CREAT SERPL: 31 (ref 12–20)
CALCIUM SERPL-MCNC: 8.5 MG/DL (ref 8.5–10.1)
CHLORIDE SERPL-SCNC: 105 MMOL/L (ref 97–108)
CO2 SERPL-SCNC: 34 MMOL/L (ref 21–32)
CREAT SERPL-MCNC: 1.39 MG/DL (ref 0.55–1.02)
GLUCOSE BLD STRIP.AUTO-MCNC: 199 MG/DL (ref 65–100)
GLUCOSE BLD STRIP.AUTO-MCNC: 214 MG/DL (ref 65–100)
GLUCOSE BLD STRIP.AUTO-MCNC: 223 MG/DL (ref 65–100)
GLUCOSE BLD STRIP.AUTO-MCNC: 223 MG/DL (ref 65–100)
GLUCOSE BLD STRIP.AUTO-MCNC: 224 MG/DL (ref 65–100)
GLUCOSE SERPL-MCNC: 209 MG/DL (ref 65–100)
PHOSPHATE SERPL-MCNC: 2.6 MG/DL (ref 2.6–4.7)
POTASSIUM SERPL-SCNC: 3.3 MMOL/L (ref 3.5–5.1)
SERVICE CMNT-IMP: ABNORMAL
SODIUM SERPL-SCNC: 146 MMOL/L (ref 136–145)

## 2018-04-29 PROCEDURE — 74011250637 HC RX REV CODE- 250/637: Performed by: INTERNAL MEDICINE

## 2018-04-29 PROCEDURE — 74011000258 HC RX REV CODE- 258: Performed by: INTERNAL MEDICINE

## 2018-04-29 PROCEDURE — 77010033711 HC HIGH FLOW OXYGEN

## 2018-04-29 PROCEDURE — 65610000006 HC RM INTENSIVE CARE

## 2018-04-29 PROCEDURE — 74011250636 HC RX REV CODE- 250/636: Performed by: INTERNAL MEDICINE

## 2018-04-29 PROCEDURE — 74011250637 HC RX REV CODE- 250/637: Performed by: HOSPITALIST

## 2018-04-29 PROCEDURE — 74011000250 HC RX REV CODE- 250: Performed by: INTERNAL MEDICINE

## 2018-04-29 PROCEDURE — 94640 AIRWAY INHALATION TREATMENT: CPT

## 2018-04-29 PROCEDURE — 74011000250 HC RX REV CODE- 250: Performed by: NURSE PRACTITIONER

## 2018-04-29 PROCEDURE — 80069 RENAL FUNCTION PANEL: CPT | Performed by: INTERNAL MEDICINE

## 2018-04-29 PROCEDURE — 36415 COLL VENOUS BLD VENIPUNCTURE: CPT | Performed by: INTERNAL MEDICINE

## 2018-04-29 PROCEDURE — 82962 GLUCOSE BLOOD TEST: CPT

## 2018-04-29 PROCEDURE — 74011636637 HC RX REV CODE- 636/637: Performed by: HOSPITALIST

## 2018-04-29 RX ORDER — POTASSIUM CHLORIDE 20MEQ/15ML
40 LIQUID (ML) ORAL
Status: COMPLETED | OUTPATIENT
Start: 2018-04-29 | End: 2018-04-29

## 2018-04-29 RX ADMIN — LEVOTHYROXINE SODIUM 88 MCG: 88 TABLET ORAL at 06:30

## 2018-04-29 RX ADMIN — OXYCODONE HYDROCHLORIDE AND ACETAMINOPHEN 500 MG: 500 TABLET ORAL at 08:04

## 2018-04-29 RX ADMIN — IPRATROPIUM BROMIDE AND ALBUTEROL SULFATE 3 ML: .5; 3 SOLUTION RESPIRATORY (INHALATION) at 07:54

## 2018-04-29 RX ADMIN — METOPROLOL TARTRATE 5 MG: 5 INJECTION, SOLUTION INTRAVENOUS at 18:29

## 2018-04-29 RX ADMIN — ACETYLCYSTEINE 200 MG: 200 SOLUTION ORAL; RESPIRATORY (INHALATION) at 18:17

## 2018-04-29 RX ADMIN — Medication 10 ML: at 13:54

## 2018-04-29 RX ADMIN — ACETYLCYSTEINE 200 MG: 200 SOLUTION ORAL; RESPIRATORY (INHALATION) at 19:48

## 2018-04-29 RX ADMIN — INSULIN LISPRO 3 UNITS: 100 INJECTION, SOLUTION INTRAVENOUS; SUBCUTANEOUS at 06:35

## 2018-04-29 RX ADMIN — ACETYLCYSTEINE 200 MG: 200 SOLUTION ORAL; RESPIRATORY (INHALATION) at 07:55

## 2018-04-29 RX ADMIN — Medication 10 ML: at 06:25

## 2018-04-29 RX ADMIN — ANASTROZOLE 1 MG: 1 TABLET, COATED ORAL at 08:04

## 2018-04-29 RX ADMIN — FLUCONAZOLE 100 MG: 2 INJECTION INTRAVENOUS at 11:17

## 2018-04-29 RX ADMIN — METOPROLOL TARTRATE 5 MG: 5 INJECTION, SOLUTION INTRAVENOUS at 06:24

## 2018-04-29 RX ADMIN — Medication 20 ML: at 10:00

## 2018-04-29 RX ADMIN — INSULIN LISPRO 3 UNITS: 100 INJECTION, SOLUTION INTRAVENOUS; SUBCUTANEOUS at 11:16

## 2018-04-29 RX ADMIN — IPRATROPIUM BROMIDE AND ALBUTEROL SULFATE 3 ML: .5; 3 SOLUTION RESPIRATORY (INHALATION) at 19:48

## 2018-04-29 RX ADMIN — BUDESONIDE 500 MCG: 0.5 INHALANT RESPIRATORY (INHALATION) at 19:48

## 2018-04-29 RX ADMIN — SPIRONOLACTONE 25 MG: 25 TABLET, FILM COATED ORAL at 08:04

## 2018-04-29 RX ADMIN — Medication 400 MG: at 08:04

## 2018-04-29 RX ADMIN — MEROPENEM 500 MG: 500 INJECTION, POWDER, FOR SOLUTION INTRAVENOUS at 12:52

## 2018-04-29 RX ADMIN — Medication 10 ML: at 14:00

## 2018-04-29 RX ADMIN — IPRATROPIUM BROMIDE AND ALBUTEROL SULFATE 3 ML: .5; 3 SOLUTION RESPIRATORY (INHALATION) at 18:17

## 2018-04-29 RX ADMIN — Medication 10 ML: at 10:00

## 2018-04-29 RX ADMIN — HYDRALAZINE HYDROCHLORIDE 25 MG: 25 TABLET ORAL at 08:04

## 2018-04-29 RX ADMIN — POTASSIUM CHLORIDE 40 MEQ: 20 SOLUTION ORAL at 13:51

## 2018-04-29 RX ADMIN — MEROPENEM 500 MG: 500 INJECTION, POWDER, FOR SOLUTION INTRAVENOUS at 00:20

## 2018-04-29 RX ADMIN — HYDRALAZINE HYDROCHLORIDE 25 MG: 25 TABLET ORAL at 16:24

## 2018-04-29 RX ADMIN — METOPROLOL TARTRATE 5 MG: 5 INJECTION, SOLUTION INTRAVENOUS at 00:20

## 2018-04-29 RX ADMIN — DEXTROSE MONOHYDRATE AND POTASSIUM CHLORIDE: 5; .149 INJECTION, SOLUTION INTRAVENOUS at 12:52

## 2018-04-29 RX ADMIN — METOPROLOL TARTRATE 5 MG: 5 INJECTION, SOLUTION INTRAVENOUS at 11:17

## 2018-04-29 RX ADMIN — THERA TABS 1 TABLET: TAB at 08:04

## 2018-04-29 RX ADMIN — Medication 10 ML: at 23:33

## 2018-04-29 RX ADMIN — Medication 1 CAPSULE: at 08:04

## 2018-04-29 RX ADMIN — Medication 10 ML: at 06:24

## 2018-04-29 RX ADMIN — VITAMIN D, TAB 1000IU (100/BT) 1000 UNITS: 25 TAB at 08:04

## 2018-04-29 RX ADMIN — ACETYLCYSTEINE 200 MG: 200 SOLUTION ORAL; RESPIRATORY (INHALATION) at 12:58

## 2018-04-29 RX ADMIN — INSULIN LISPRO 3 UNITS: 100 INJECTION, SOLUTION INTRAVENOUS; SUBCUTANEOUS at 16:23

## 2018-04-29 RX ADMIN — IPRATROPIUM BROMIDE AND ALBUTEROL SULFATE 3 ML: .5; 3 SOLUTION RESPIRATORY (INHALATION) at 03:02

## 2018-04-29 RX ADMIN — Medication 10 ML: at 06:00

## 2018-04-29 RX ADMIN — IPRATROPIUM BROMIDE AND ALBUTEROL SULFATE 3 ML: .5; 3 SOLUTION RESPIRATORY (INHALATION) at 12:58

## 2018-04-29 RX ADMIN — BUDESONIDE 500 MCG: 0.5 INHALANT RESPIRATORY (INHALATION) at 07:54

## 2018-04-29 RX ADMIN — FOLIC ACID 1 MG: 1 TABLET ORAL at 08:04

## 2018-04-29 NOTE — PROGRESS NOTES
[facility]   [address] [address], [facility] [address], [facility]  Phone: [phone]   [phone]       Nephrology Progress Note  Trudy Haywood     1935     [id number]  Date of Admission : 4/16/2018 04/29/18    CC: Follow up for JEROD        Assessment and Plan   JEROD on CKD   - JEROD is multifactorial   - resolving   - continue to hold Bumex     Hypokalemia :  - 40 meq Kcl today   - continue aldactone     Left Lower Pole Renal Calculus :  - likely source of UTI and Pyelo like picture   - sepsis better now and urine had Pan sensitive E coli and Pseudomonas     Labile HTN:  - she has large calcified atherosclerotic lesion of Left renal artery ostium, CT was done without contrast to determine the severity of SONALI  - conservative management w/ meds  - continue current meds     Acute Resp Failure:  - bronch 4/25 and 4/26  - per PCCM     Sepsis  -off pressors     CKD Stage III :  - baseline Cr 0.9-1 mg/dl lately   - progressing, Cr around 1.5 to 1.7 here     NSTEMI :  - Echo shows EF 35-45%, mod LVH, Severe Hypokinesis of apex and moderate Hypokinesis of anteroseptal wall     Acute Systolic CHF: 2/2 MI   - avoid  ACE/ARB for now     Chronic Pontine Infarcts      Metastatic Breast Ca w/ Calvarial and Bone mets   - s/p breast Bx      - hx of colon Ca     Anemia :  - per heme/onc     Interval History:  Feels betters  Drinking fluids  On high flow and no distress    Review of Systems: Review of systems not obtained due to patient factors.     Current Medications:   Current Facility-Administered Medications   Medication Dose Route Frequency    potassium chloride (KAON 10%) 20 mEq/15 mL oral liquid 40 mEq  40 mEq Oral NOW    spironolactone (ALDACTONE) tablet 25 mg  25 mg Oral DAILY    dextrose 5% with KCl 20 mEq/L infusion   IntraVENous CONTINUOUS    acetaminophen (TYLENOL) suppository 650 mg  650 mg Rectal Q4H PRN    hydrALAZINE (APRESOLINE) tablet 25 mg  25 mg Oral TID    levoFLOXacin (LEVAQUIN) 750 mg in D5W IVPB  750 mg IntraVENous Q48H    metoprolol (LOPRESSOR) injection 5 mg  5 mg IntraVENous Q6H    sodium chloride (NS) flush 5-10 mL  5-10 mL IntraVENous Q8H    sodium chloride (NS) flush 5-10 mL  5-10 mL IntraVENous PRN    albuterol-ipratropium (DUO-NEB) 2.5 MG-0.5 MG/3 ML  3 mL Nebulization Q4H RT    budesonide (PULMICORT) 500 mcg/2 ml nebulizer suspension  500 mcg Nebulization BID RT    acetylcysteine (MUCOMYST) 200 mg/mL (20 %) solution 200 mg  200 mg Nebulization QID RT    hydrALAZINE (APRESOLINE) 20 mg/mL injection 10 mg  10 mg IntraVENous Q6H PRN    fluconazole in 0.9% NaCl 100 mg IVPB  100 mg IntraVENous Q24H    0.9% sodium chloride infusion 250 mL  250 mL IntraVENous PRN    sodium chloride (NS) flush 20 mL  20 mL InterCATHeter PRN    sodium chloride (NS) flush 10 mL  10 mL InterCATHeter Q24H    sodium chloride (NS) flush 10 mL  10 mL InterCATHeter PRN    sodium chloride (NS) flush 10 mL  10 mL InterCATHeter Q8H    alteplase (CATHFLO) 1 mg in sterile water (preservative free) 1 mL injection  1 mg InterCATHeter PRN    bacitracin 500 unit/gram packet 1 Packet  1 Packet Topical PRN    meropenem (MERREM) 500 mg in 0.9% sodium chloride (MBP/ADV) 50 mL  0.5 g IntraVENous Q12H    sodium chloride (NS) flush 10-30 mL  10-30 mL InterCATHeter PRN    sodium chloride (NS) flush 10 mL  10 mL InterCATHeter Q24H    sodium chloride (NS) flush 10 mL  10 mL InterCATHeter PRN    sodium chloride (NS) flush 10-40 mL  10-40 mL InterCATHeter Q8H    sodium chloride (NS) flush 20 mL  20 mL InterCATHeter Q24H    anastrozole (ARIMIDEX) tablet 1 mg  1 mg Oral DAILY    0.9% sodium chloride infusion 250 mL  250 mL IntraVENous PRN    epoetin celio (EPOGEN;PROCRIT) injection 10,000 Units  10,000 Units SubCUTAneous Q MON, WED & FRI    albuterol-ipratropium (DUO-NEB) 2.5 MG-0.5 MG/3 ML  3 mL Nebulization Q6H PRN    glucose chewable tablet 16 g  4 Tab Oral PRN    dextrose (D50W) injection syrg 12.5-25 g 12.5-25 g IntraVENous PRN    glucagon (GLUCAGEN) injection 1 mg  1 mg IntraMUSCular PRN    insulin lispro (HUMALOG) injection   SubCUTAneous AC&HS    prochlorperazine (COMPAZINE) with saline injection 5 mg  5 mg IntraVENous Q8H PRN    ascorbic acid (vitamin C) (VITAMIN C) tablet 500 mg  500 mg Oral DAILY    cholecalciferol (VITAMIN D3) tablet 1,000 Units  1,000 Units Oral DAILY    folic acid (FOLVITE) tablet 1 mg  1 mg Oral DAILY    magnesium oxide (MAG-OX) tablet 400 mg  400 mg Oral DAILY    therapeutic multivitamin (THERAGRAN) tablet 1 Tab  1 Tab Oral DAILY    fish oil-omega-3 fatty acids 340-1,000 mg capsule 1 Cap  1 Cap Oral DAILY    sodium chloride (NS) flush 5-10 mL  5-10 mL IntraVENous Q8H    sodium chloride (NS) flush 5-10 mL  5-10 mL IntraVENous PRN    ondansetron (ZOFRAN) injection 4 mg  4 mg IntraVENous Q4H PRN    levothyroxine (SYNTHROID) tablet 88 mcg  88 mcg Oral ACB      Allergies   Allergen Reactions    Statins-Hmg-Coa Reductase Inhibitors Other (comments)     Intolerant to statins     Sulfa (Sulfonamide Antibiotics) Other (comments)     syncope       Objective:  Vitals:    Vitals:    04/29/18 1100 04/29/18 1200 04/29/18 1258 04/29/18 1300   BP: 150/68 144/61  143/62   Pulse: (!) 103 96  95   Resp: 24 26  26   Temp:  98.1 °F (36.7 °C)     SpO2: 100% 99% 100% 100%   Weight:       Height:         Intake and Output:  04/29 0701 - 04/29 1900  In: 720 [P.O.:240; I.V.:450]  Out: 225 [Urine:225]  04/27 1901 - 04/29 0700  In: 1530.8 [P.O.:120;  I.V.:1410.8]  Out: 2058 [Urine:2705]    Physical Examination:    General: Lethargic, on high flow   Neck:  Supple, no mass  Resp:  Diminished at bases   CV:  RRR, no murmur  GI:  Soft, NT, + BS  Neurologic:  Non focal     []    High complexity decision making was performed  []    Patient is at high-risk of decompensation with multiple organ involvement    Lab Data Personally Reviewed: I have reviewed all the pertinent labs, microbiology data and radiology studies during assessment.     Recent Labs      04/29/18   0904  04/28/18   1432  04/28/18 0416  04/27/18 0714   NA  146*  150*  148*  144   K  3.3*  3.8  2.4*  2.5*   CL  105  108  104  102   CO2  34*  36*  33*  32   GLU  209*  168*  147*  153*   BUN  43*  51*  48*  51*   CREA  1.39*  1.44*  1.29*  1.42*   CA  8.5  9.5  9.3  9.3   MG   --    --   1.9  1.9   PHOS  2.6  1.5*   --   3.0   ALB  2.4*  2.6*   --   2.7*     Recent Labs      04/28/18 0416 04/27/18 0714   WBC  17.5*  18.8*   HGB  11.0*  10.3*   HCT  34.5*  32.2*   PLT  320  284     No results found for: SDES  Lab Results   Component Value Date/Time    Culture result: LIGHT APPARENT CANDIDA ALBICANS (A) 04/26/2018 09:10 AM    Culture result: NO NORMAL RESPIRATORY BERYL ISOLATED 04/26/2018 09:10 AM    Culture result: MODERATE APPARENT CANDIDA ALBICANS (A) 04/25/2018 09:40 AM    Culture result: RARE NORMAL RESPIRATORY BERYL 04/25/2018 09:40 AM    Culture result: NO GROWTH 1 DAY 04/22/2018 07:35 AM    Culture result: NO GROWTH 5 DAYS 04/22/2018 07:35 AM     Recent Results (from the past 24 hour(s))   RENAL FUNCTION PANEL    Collection Time: 04/28/18  2:32 PM   Result Value Ref Range    Sodium 150 (H) 136 - 145 mmol/L    Potassium 3.8 3.5 - 5.1 mmol/L    Chloride 108 97 - 108 mmol/L    CO2 36 (H) 21 - 32 mmol/L    Anion gap 6 5 - 15 mmol/L    Glucose 168 (H) 65 - 100 mg/dL    BUN 51 (H) 6 - 20 MG/DL    Creatinine 1.44 (H) 0.55 - 1.02 MG/DL    BUN/Creatinine ratio 35 (H) 12 - 20      GFR est AA 42 (L) >60 ml/min/1.73m2    GFR est non-AA 35 (L) >60 ml/min/1.73m2    Calcium 9.5 8.5 - 10.1 MG/DL    Phosphorus 1.5 (L) 2.6 - 4.7 MG/DL    Albumin 2.6 (L) 3.5 - 5.0 g/dL   GLUCOSE, POC    Collection Time: 04/28/18  3:53 PM   Result Value Ref Range    Glucose (POC) 162 (H) 65 - 100 mg/dL    Performed by Artie MCCULLOUGH    GLUCOSE, POC    Collection Time: 04/28/18 10:02 PM   Result Value Ref Range    Glucose (POC) 214 (H) 65 - 100 mg/dL Performed by Kina Heredia    GLUCOSE, POC    Collection Time: 04/29/18  6:29 AM   Result Value Ref Range    Glucose (POC) 223 (H) 65 - 100 mg/dL    Performed by Kina Heredia    RENAL FUNCTION PANEL    Collection Time: 04/29/18  9:04 AM   Result Value Ref Range    Sodium 146 (H) 136 - 145 mmol/L    Potassium 3.3 (L) 3.5 - 5.1 mmol/L    Chloride 105 97 - 108 mmol/L    CO2 34 (H) 21 - 32 mmol/L    Anion gap 7 5 - 15 mmol/L    Glucose 209 (H) 65 - 100 mg/dL    BUN 43 (H) 6 - 20 MG/DL    Creatinine 1.39 (H) 0.55 - 1.02 MG/DL    BUN/Creatinine ratio 31 (H) 12 - 20      GFR est AA 44 (L) >60 ml/min/1.73m2    GFR est non-AA 36 (L) >60 ml/min/1.73m2    Calcium 8.5 8.5 - 10.1 MG/DL    Phosphorus 2.6 2.6 - 4.7 MG/DL    Albumin 2.4 (L) 3.5 - 5.0 g/dL   GLUCOSE, POC    Collection Time: 04/29/18 11:11 AM   Result Value Ref Range    Glucose (POC) 224 (H) 65 - 100 mg/dL    Performed by Florence Lind        I have reviewed the flowsheets. Chart and Pertinent Notes have been reviewed. No change in PMH ,family and social history from Consult note.       Eran Waller MD

## 2018-04-29 NOTE — PROGRESS NOTES
1930-Bedside and Verbal shift change report given to Richelle Lopez (oncoming nurse) by Sanju Rojas (offgoing nurse). Report included the following information SBAR, Kardex, ED Summary, Intake/Output, MAR and Recent Results.

## 2018-04-29 NOTE — PROGRESS NOTES
Cardiology Progress Note            Admit Date: 4/16/2018  Admit Diagnosis: SOB (shortness of breath);colon; Anemia;COLON  Date: 4/29/2018     Time: 2:01 PM    HPI: 82 y.o. Female with new diagnosis of CHF. Presented with chief c/o of SOB, found to have pulmonary edema and EF 40% on TTE. Noted to have NSTEMI and JEROD on CKD. Pt with hx of colon cancer and now concern for metastatic breast CA. Was in ICU over weekend (4/21-4/22) for respiratory failure, sepsis, psa UTI. Improved and transferred to floor 4/24. LHC on hold as probable metastatic disease. Interval hx: Transferred to ICU on 4/23 due to AMS and SOB. Started on nitroglycerine IV for BP control. Left lung opacification on a.m. Chest XRay 4/25/18 - mucous plug, had therapeutic bronch. Repeat CXR 4/26 with opacification of Left lung noted- therapeutic bronch repeated. Feeling better this AM.  Less short breath. Even balance overnight     Assessment and Plan     1. NSTEMI:     -Remains with no chest pain. Medical mgmt as able given other issues. -12 lead EKG 4/24, marked ST abnormality.   -Repeat TTE:4/27/18  EF unchanged 45% with mild hypokinesis  basal-mid anteroseptal and apical walls.   -Held ASA and Plavix on 4/26 d/t worsened anemia. (hgb 6.7)- hgb now 10 s/p 1 unit PRBC    -Coreg to 12.5 mg BID. (HR/BP elevated this a.m.)   -Statin intolerant due to weakness on zocor, on repatha, last LDL 31, so will not rechallenge statin     2. Cardiomyopathy/Acute HFrEF, new:  EF 45% NYHA IV     -Repeat TTE 4/26 EF unchanged 45%. -CXR with persistent congestive changes, basilar airspace disease and sm arjun pleural effusions. Improved JM aeration.    -was on Bumex 2 mg IV q 12 hours; changed to aldactone for now with severe hypokalemia. K much improved.    -Holding ARB/ACE-I d/t elevated creatinine   -coreg to 12.5 mg BID    -Daily weights, I/Os (net negative 3600/24 hours)       3. JEROD on CKD:   Creatinine improved to 1.42 (GFR 36)    -Nephrology following: has large calcified atherosclerotic lesion of Left renal artery ostium, CT was done without contrast to determine the severity of SONALI   -diuretics per nephrology. 4. Acute respiratory failure:   -diuretics as above   -Pulmonary following- prn ativan for anxiety. 5. Anemia: hgb improved to 10.3 s/p 1 unit PRBC. Also on procrit     7. HTN- BP  elevated   -Coreg increased to 12.5 mg BID   -Hydralazine prn    8. Leukocytosis/ pseudomonal UTI: ID on consult     9. Hx of Colon cancer, now suspected metastatic breast ca (bone, liver,calvarium mets)  -Hematology oncology following. 10  Hypokalemia/Hypomagnesemia:  K=2.9  -Repleted. 11. Advanced directives:  Partial code- no CPR, no shocks. Palliative care following. Breathing improved. TTE mildly improved, EF (45%) on repeat TTE     Code status- no shocks or CPR but still ok with intubation if needed. Subjective:   Sandy Ledesma denies chest pain. Breathing improved and mild abdominal pain. Objective:      Physical Exam:                Visit Vitals    /61    Pulse 95    Temp 99.1 °F (37.3 °C)    Resp 28    Ht 5' 1\" (1.549 m)    Wt 122 lb 5.7 oz (55.5 kg)    SpO2 100%    BMI 23.12 kg/m2          General Appearance:   elderly female, appears weak, mildly tachypneic   Ears/Nose/Mouth/Throat:    Hearing grossly normal.         Neck:  Supple. Chest:     Course breath sounds bilaterally, + mild SOB     Cardiovascular:   Regular rate and rhythm, S1, S2 normal, no murmur,    Abdomen:    Soft, mild ttp, no gaurding. Extremities:  No edema bilaterally. Skin:  Warm and dry. Telemetry: Sinus rhythm- sinus tachycardia.            Data Review:    Labs:    Recent Results (from the past 24 hour(s))   GLUCOSE, POC    Collection Time: 04/28/18 11:32 AM   Result Value Ref Range    Glucose (POC) 167 (H) 65 - 100 mg/dL    Performed by Delbert Campos VIVIAN MCCULLOUGH    RENAL FUNCTION PANEL    Collection Time: 04/28/18  2:32 PM   Result Value Ref Range    Sodium 150 (H) 136 - 145 mmol/L    Potassium 3.8 3.5 - 5.1 mmol/L    Chloride 108 97 - 108 mmol/L    CO2 36 (H) 21 - 32 mmol/L    Anion gap 6 5 - 15 mmol/L    Glucose 168 (H) 65 - 100 mg/dL    BUN 51 (H) 6 - 20 MG/DL    Creatinine 1.44 (H) 0.55 - 1.02 MG/DL    BUN/Creatinine ratio 35 (H) 12 - 20      GFR est AA 42 (L) >60 ml/min/1.73m2    GFR est non-AA 35 (L) >60 ml/min/1.73m2    Calcium 9.5 8.5 - 10.1 MG/DL    Phosphorus 1.5 (L) 2.6 - 4.7 MG/DL    Albumin 2.6 (L) 3.5 - 5.0 g/dL   GLUCOSE, POC    Collection Time: 04/28/18  3:53 PM   Result Value Ref Range    Glucose (POC) 162 (H) 65 - 100 mg/dL    Performed by Kirsten MCCULLOUGH    GLUCOSE, POC    Collection Time: 04/28/18 10:02 PM   Result Value Ref Range    Glucose (POC) 214 (H) 65 - 100 mg/dL    Performed by Melanie Arriola    GLUCOSE, POC    Collection Time: 04/29/18  6:29 AM   Result Value Ref Range    Glucose (POC) 223 (H) 65 - 100 mg/dL    Performed by Melanie Arriola           Radiology:        Current Facility-Administered Medications   Medication Dose Route Frequency    spironolactone (ALDACTONE) tablet 25 mg  25 mg Oral DAILY    dextrose 5% with KCl 20 mEq/L infusion   IntraVENous CONTINUOUS    acetaminophen (TYLENOL) suppository 650 mg  650 mg Rectal Q4H PRN    hydrALAZINE (APRESOLINE) tablet 25 mg  25 mg Oral TID    levoFLOXacin (LEVAQUIN) 750 mg in D5W IVPB  750 mg IntraVENous Q48H    metoprolol (LOPRESSOR) injection 5 mg  5 mg IntraVENous Q6H    sodium chloride (NS) flush 5-10 mL  5-10 mL IntraVENous Q8H    sodium chloride (NS) flush 5-10 mL  5-10 mL IntraVENous PRN    albuterol-ipratropium (DUO-NEB) 2.5 MG-0.5 MG/3 ML  3 mL Nebulization Q4H RT    budesonide (PULMICORT) 500 mcg/2 ml nebulizer suspension  500 mcg Nebulization BID RT    acetylcysteine (MUCOMYST) 200 mg/mL (20 %) solution 200 mg  200 mg Nebulization QID RT  hydrALAZINE (APRESOLINE) 20 mg/mL injection 10 mg  10 mg IntraVENous Q6H PRN    fluconazole in 0.9% NaCl 100 mg IVPB  100 mg IntraVENous Q24H    0.9% sodium chloride infusion 250 mL  250 mL IntraVENous PRN    sodium chloride (NS) flush 20 mL  20 mL InterCATHeter PRN    sodium chloride (NS) flush 10 mL  10 mL InterCATHeter Q24H    sodium chloride (NS) flush 10 mL  10 mL InterCATHeter PRN    sodium chloride (NS) flush 10 mL  10 mL InterCATHeter Q8H    alteplase (CATHFLO) 1 mg in sterile water (preservative free) 1 mL injection  1 mg InterCATHeter PRN    bacitracin 500 unit/gram packet 1 Packet  1 Packet Topical PRN    meropenem (MERREM) 500 mg in 0.9% sodium chloride (MBP/ADV) 50 mL  0.5 g IntraVENous Q12H    sodium chloride (NS) flush 10-30 mL  10-30 mL InterCATHeter PRN    sodium chloride (NS) flush 10 mL  10 mL InterCATHeter Q24H    sodium chloride (NS) flush 10 mL  10 mL InterCATHeter PRN    sodium chloride (NS) flush 10-40 mL  10-40 mL InterCATHeter Q8H    sodium chloride (NS) flush 20 mL  20 mL InterCATHeter Q24H    anastrozole (ARIMIDEX) tablet 1 mg  1 mg Oral DAILY    0.9% sodium chloride infusion 250 mL  250 mL IntraVENous PRN    epoetin celio (EPOGEN;PROCRIT) injection 10,000 Units  10,000 Units SubCUTAneous Q MON, WED & FRI    albuterol-ipratropium (DUO-NEB) 2.5 MG-0.5 MG/3 ML  3 mL Nebulization Q6H PRN    glucose chewable tablet 16 g  4 Tab Oral PRN    dextrose (D50W) injection syrg 12.5-25 g  12.5-25 g IntraVENous PRN    glucagon (GLUCAGEN) injection 1 mg  1 mg IntraMUSCular PRN    insulin lispro (HUMALOG) injection   SubCUTAneous AC&HS    prochlorperazine (COMPAZINE) with saline injection 5 mg  5 mg IntraVENous Q8H PRN    ascorbic acid (vitamin C) (VITAMIN C) tablet 500 mg  500 mg Oral DAILY    cholecalciferol (VITAMIN D3) tablet 1,000 Units  1,000 Units Oral DAILY    folic acid (FOLVITE) tablet 1 mg  1 mg Oral DAILY    magnesium oxide (MAG-OX) tablet 400 mg  400 mg Oral DAILY    therapeutic multivitamin (THERAGRAN) tablet 1 Tab  1 Tab Oral DAILY    fish oil-omega-3 fatty acids 340-1,000 mg capsule 1 Cap  1 Cap Oral DAILY    sodium chloride (NS) flush 5-10 mL  5-10 mL IntraVENous Q8H    sodium chloride (NS) flush 5-10 mL  5-10 mL IntraVENous PRN    ondansetron (ZOFRAN) injection 4 mg  4 mg IntraVENous Q4H PRN    levothyroxine (SYNTHROID) tablet 88 mcg  88 mcg Oral ACB     Pt critically ill, poor prognosis, multiple severe issues.        Bernadette Daniels MD     [facility] [address] [place] [address] 13, [address] [facility] [address]   Radha Montana   [phone]

## 2018-04-29 NOTE — PROGRESS NOTES
45 Shift Summary: Patient had an uneventful shift. VSS. Tolerating high flow oxygen. Patient mostly oriented x 4. Follows commands. Son at bedside most of the night. 50 30 45 30 30 30 30 45

## 2018-04-29 NOTE — PROGRESS NOTES
0730-Bedside and Verbal shift change report given to [address] (oncoming nurse) by Missy Stroud (offgoing nurse). Report included the following information SBAR, Kardex, ED Summary, Procedure Summary, Intake/Output, MAR and Recent Results.

## 2018-04-29 NOTE — PROGRESS NOTES
Hospitalist Progress Note  Tobi Choi MD  Answering service: [phone] OR 36 from in house phone  Cell: [phone]      Date of Service:  2018  NAME:  Prakash Rothman  :  1935  MRN:  [id number]      Admission Summary:   The patient is an 71-year-old female with history of diabetes and rheumatoid arthritis who presented to the emergency room via EMS with complaints of shortness of breath    Interval history / Subjective:   She is conscious and more alert, conversant, she said she feels better, no pain, care giver at bedside     Assessment & Plan:     Acute on chronic hypoxic/hypercarbic respiratory failure due to pulmonary edema, pneumonia  -off BiPAP, now on high flow, duo neb, mucomyst, IV antibiotics,  monitor pulse ox  -repeated chest x ray  there is pulmonary vascular prominence, bilateral interstitial edema, bibasilar airspace disease and posterior layering bilateral pleural effusions, unchanged.   -s/p bronchoscopy on  and on  suctioned and cleared  -respiratory culture grow on  and  grow candida albicans, on diflucan  -bumex is stopped on   -intensivist on board    Acute encephalopathy possible due to metabolic  -RRT was called for restless and agitation early this morning, patient become more confused, disoriented and lethargic, wakeful to voice, non verbal but follows simple command  -CT head on  volume loss and minimal white matter disease. No acute intracranial [place], Sinus mucosal inflammatory change  -MRI of brain  Study limited by motion artifact, volume loss and white matter disease. No definite intracranial metastasis however no intravenous contrast was administered due to poor renal function.  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  -improved, patient much more alert and answer questions, moves all extremities on   -neurologist onboard    Sepsis secondary to E Coli UTI, pneumonia POA  -on zosyn, vancomycin, meropenem, and fluconazole   -urine cx  4/19 grow e coli, pseudomanas sp  -blood cx on 4/18, 4/19 and 4/22 no growth    NSTEMI  -on fish oil  -aspirin and plavix held due to low Hgb  -IV metoprolol is added on 4/28  -denied chest pain  -cardiologist on board    Acute systolic CHF NYHA Class IV  -bumex is stopped, on hydralazine, started on spironolactone 4/28  -iv metoprolol  -not on ACEi/ ARB due to renal insufficiency   -monitor I/O and daily weight    JEROD on CKD stage III  -creatinine improving  -bumex is stopped on 4/28  -monitor renal function, urine output  -nephrologist on board    Possible metastatic left breast cancer  -on armidex   -Hem/Onc on board    HTN  -BP elevated and now started on hydralazine, monitor BP    DM-II  -continue insulin sliding scale, monitor finger stick glucose    Hypothyroidism  -continue synthroid    Hx of GERD  -continue pepcid    Elevated liver enzyme   -possible related to liver lesion, monitor LFT    Anemia multifactorial  -Hgb 6.7, s/p 2 units pRBC on 4/26, repeated and improved Hgb 11, monitor cbc    Hx of colon cancer 25 years ago  -according to son, there is a work-up planned with [facility] regarding the possibility of recurrent CA (based on lesions seen in the calvarium on MRI - 3/19).     -hem/onc on board    Code status: DNR    Code status: DNR  DVT prophylaxis:SCD    Care Plan discussed with: Patient/Family and Nurse  Disposition: Transfer to ICU      Case discussed with her son, Amber Lawson , on 4/27 questions answered     Hospital Problems  Date Reviewed: 4/16/2018          Codes Class Noted POA    Acute systolic heart failure ([facility] [address] ICD-10-CM: I50.21  ICD-9-CM: 428.21  4/24/2018 Unknown        Altered mental status, unspecified ICD-10-CM: R41.82  ICD-9-CM: 780.97  4/23/2018 Unknown        * (Principal)SOB (shortness of breath) ICD-10-CM: R06.02  ICD-9-CM: 786.05  4/16/2018 Unknown                Vital Signs:    Last 24hrs VS reviewed since prior progress note. Most recent are:  Visit Vitals    /61    Pulse 96    Temp 98.1 °F (36.7 °C)    Resp 26    Ht 5' 1\" (1.549 m)    Wt 55.5 kg (122 lb 5.7 oz)    SpO2 99%    BMI 23.12 kg/m2         Intake/Output Summary (Last 24 hours) at 04/29/18 1209  Last data filed at 04/29/18 1200   Gross per 24 hour   Intake          1430.75 ml   Output             1095 ml   Net           335.75 ml        Physical Examination:             Constitutional:  Conscious and alert, on high flow oxygen, not in distress, cooperative, pleasant    ENT:  Oral mucous moist, oropharynx benign. Neck supple,    Resp:  Decrease bronchial breath sound, few wheezing and rhonic bilaterally. No accessory muscle use   CV:  Regular rhythm, normal rate, no murmurs, gallops, rubs    GI:  Soft, non distended, non tender. normoactive bowel sounds, no hepatosplenomegaly     Musculoskeletal:  pedal edema    Neurologic:  Conscious and alert, oriented to place and person, answer questions, moves all extremities     Skin:  Good turgor, no rashes or ulcers       Data Review:    Review and/or order of clinical lab test      Labs:     Recent Labs      04/28/18   0416  04/27/18   0714   WBC  17.5*  18.8*   HGB  11.0*  10.3*   HCT  34.5*  32.2*   PLT  320  284     Recent Labs      04/29/18   0904  04/28/18   1432  04/28/18   0416  04/27/18   0714   NA  146*  150*  148*  144   K  3.3*  3.8  2.4*  2.5*   CL  105  108  104  102   CO2  34*  36*  33*  32   BUN  43*  51*  48*  51*   CREA  1.39*  1.44*  1.29*  1.42*   GLU  209*  168*  147*  153*   CA  8.5  9.5  9.3  9.3   MG   --    --   1.9  1.9   PHOS  2.6  1.5*   --   3.0     Recent Labs      04/29/18   0904  04/28/18   1432  04/27/18   0714   ALB  2.4*  2.6*  2.7*     No results for input(s): INR, PTP, APTT in the last 72 hours. No lab exists for component: INREXT, INREXT   No results for input(s): FE, TIBC, PSAT, FERR in the last 72 hours.    No results found for: FOL, RBCF   No results for input(s): PH, PCO2, PO2 in the last 72 hours. No results for input(s): CPK, CKNDX, TROIQ in the last 72 hours.     No lab exists for component: CPKMB  Lab Results   Component Value Date/Time    Cholesterol, total 74 04/16/2018 04:21 AM    HDL Cholesterol 25 04/16/2018 04:21 AM    LDL, calculated 31.6 04/16/2018 04:21 AM    Triglyceride 87 04/16/2018 04:21 AM    CHOL/HDL Ratio 3.0 04/16/2018 04:21 AM     Lab Results   Component Value Date/Time    Glucose (POC) 224 (H) 04/29/2018 11:11 AM    Glucose (POC) 223 (H) 04/29/2018 06:29 AM    Glucose (POC) 214 (H) 04/28/2018 10:02 PM    Glucose (POC) 162 (H) 04/28/2018 03:53 PM    Glucose (POC) 167 (H) 04/28/2018 11:32 AM     Lab Results   Component Value Date/Time    Color YELLOW/STRAW 04/20/2018 04:30 AM    Appearance TURBID (A) 04/20/2018 04:30 AM    Specific gravity 1.029 04/20/2018 04:30 AM    pH (UA) 5.0 04/20/2018 04:30 AM    Protein 100 (A) 04/20/2018 04:30 AM    Glucose NEGATIVE  04/20/2018 04:30 AM    Ketone NEGATIVE  04/20/2018 04:30 AM    Bilirubin NEGATIVE  04/20/2018 04:30 AM    Urobilinogen 0.2 04/20/2018 04:30 AM    Nitrites NEGATIVE  04/20/2018 04:30 AM    Leukocyte Esterase LARGE (A) 04/20/2018 04:30 AM    Epithelial cells FEW 04/20/2018 04:30 AM    Bacteria 3+ (A) 04/20/2018 04:30 AM    WBC >100 (H) 04/20/2018 04:30 AM    RBC 5-10 04/20/2018 04:30 AM         Medications Reviewed:     Current Facility-Administered Medications   Medication Dose Route Frequency    spironolactone (ALDACTONE) tablet 25 mg  25 mg Oral DAILY    dextrose 5% with KCl 20 mEq/L infusion   IntraVENous CONTINUOUS    acetaminophen (TYLENOL) suppository 650 mg  650 mg Rectal Q4H PRN    hydrALAZINE (APRESOLINE) tablet 25 mg  25 mg Oral TID    levoFLOXacin (LEVAQUIN) 750 mg in D5W IVPB  750 mg IntraVENous Q48H    metoprolol (LOPRESSOR) injection 5 mg  5 mg IntraVENous Q6H    sodium chloride (NS) flush 5-10 mL  5-10 mL IntraVENous Q8H    sodium chloride (NS) flush 5-10 mL  5-10 mL IntraVENous PRN    albuterol-ipratropium (DUO-NEB) 2.5 MG-0.5 MG/3 ML  3 mL Nebulization Q4H RT    budesonide (PULMICORT) 500 mcg/2 ml nebulizer suspension  500 mcg Nebulization BID RT    acetylcysteine (MUCOMYST) 200 mg/mL (20 %) solution 200 mg  200 mg Nebulization QID RT    hydrALAZINE (APRESOLINE) 20 mg/mL injection 10 mg  10 mg IntraVENous Q6H PRN    fluconazole in 0.9% NaCl 100 mg IVPB  100 mg IntraVENous Q24H    0.9% sodium chloride infusion 250 mL  250 mL IntraVENous PRN    sodium chloride (NS) flush 20 mL  20 mL InterCATHeter PRN    sodium chloride (NS) flush 10 mL  10 mL InterCATHeter Q24H    sodium chloride (NS) flush 10 mL  10 mL InterCATHeter PRN    sodium chloride (NS) flush 10 mL  10 mL InterCATHeter Q8H    alteplase (CATHFLO) 1 mg in sterile water (preservative free) 1 mL injection  1 mg InterCATHeter PRN    bacitracin 500 unit/gram packet 1 Packet  1 Packet Topical PRN    meropenem (MERREM) 500 mg in 0.9% sodium chloride (MBP/ADV) 50 mL  0.5 g IntraVENous Q12H    sodium chloride (NS) flush 10-30 mL  10-30 mL InterCATHeter PRN    sodium chloride (NS) flush 10 mL  10 mL InterCATHeter Q24H    sodium chloride (NS) flush 10 mL  10 mL InterCATHeter PRN    sodium chloride (NS) flush 10-40 mL  10-40 mL InterCATHeter Q8H    sodium chloride (NS) flush 20 mL  20 mL InterCATHeter Q24H    anastrozole (ARIMIDEX) tablet 1 mg  1 mg Oral DAILY    0.9% sodium chloride infusion 250 mL  250 mL IntraVENous PRN    epoetin celio (EPOGEN;PROCRIT) injection 10,000 Units  10,000 Units SubCUTAneous Q MON, WED & FRI    albuterol-ipratropium (DUO-NEB) 2.5 MG-0.5 MG/3 ML  3 mL Nebulization Q6H PRN    glucose chewable tablet 16 g  4 Tab Oral PRN    dextrose (D50W) injection syrg 12.5-25 g  12.5-25 g IntraVENous PRN    glucagon (GLUCAGEN) injection 1 mg  1 mg IntraMUSCular PRN    insulin lispro (HUMALOG) injection   SubCUTAneous AC&HS    prochlorperazine (COMPAZINE) with saline injection 5 mg  5 mg IntraVENous Q8H PRN    ascorbic acid (vitamin C) (VITAMIN C) tablet 500 mg  500 mg Oral DAILY    cholecalciferol (VITAMIN D3) tablet 1,000 Units  1,000 Units Oral DAILY    folic acid (FOLVITE) tablet 1 mg  1 mg Oral DAILY    magnesium oxide (MAG-OX) tablet 400 mg  400 mg Oral DAILY    therapeutic multivitamin (THERAGRAN) tablet 1 Tab  1 Tab Oral DAILY    fish oil-omega-3 fatty acids 340-1,000 mg capsule 1 Cap  1 Cap Oral DAILY    sodium chloride (NS) flush 5-10 mL  5-10 mL IntraVENous Q8H    sodium chloride (NS) flush 5-10 mL  5-10 mL IntraVENous PRN    ondansetron (ZOFRAN) injection 4 mg  4 mg IntraVENous Q4H PRN    levothyroxine (SYNTHROID) tablet 88 mcg  88 mcg Oral ACB     ______________________________________________________________________  EXPECTED LENGTH OF STAY: 4d 12h  ACTUAL LENGTH OF STAY:          13                 Imelda Montejo MD

## 2018-04-29 NOTE — ROUTINE PROCESS
Bedside and Verbal shift change report given to Richelle Lopez (oncoming nurse) by Lorraine Mckinney (offgoing nurse).  Report included the following information SBAR, Kardex, Procedure Summary, Intake/Output, Recent Results and Cardiac Rhythm SR.

## 2018-04-29 NOTE — ROUTINE PROCESS
Bedside and Verbal shift change report given to Rosmery Bejarano (oncoming nurse) by Rashida Wagoner (offgoing nurse). Report included the following information SBAR, Kardex, Intake/Output, MAR, Recent Results, Cardiac Rhythm NSR and Alarm Parameters .

## 2018-04-29 NOTE — PROGRESS NOTES
Problem: Falls - Risk of  Goal: *Absence of Falls  Document Yaron Fall Risk and appropriate interventions in the flowsheet.    Outcome: Progressing Towards Goal  Fall Risk Interventions:  Mobility Interventions: Assess mobility with egress test, PT Consult for mobility concerns, PT Consult for assist device competence, Strengthening exercises (ROM-active/passive), Utilize walker, cane, or other assitive device, Utilize gait belt for transfers/ambulation, Patient to call before getting OOB    Mentation Interventions: Door open when patient unattended, Evaluate medications/consider consulting pharmacy, Eyeglasses and hearing aids, More frequent rounding, Increase mobility, Reorient patient, Room close to nurse's station, Toileting rounds, Update white board    Medication Interventions: Evaluate medications/consider consulting pharmacy, Patient to call before getting OOB, Utilize gait belt for transfers/ambulation, Assess postural VS orthostatic hypotension, Bed/chair exit alarm    Elimination Interventions: Call light in reach, Toileting schedule/hourly rounds    History of Falls Interventions: Door open when patient unattended, Evaluate medications/consider consulting pharmacy, Room close to nurse's station, Utilize gait belt for transfer/ambulation

## 2018-04-30 ENCOUNTER — APPOINTMENT (OUTPATIENT)
Dept: GENERAL RADIOLOGY | Age: 83
DRG: 853 | End: 2018-04-30
Attending: INTERNAL MEDICINE
Payer: MEDICARE

## 2018-04-30 ENCOUNTER — APPOINTMENT (OUTPATIENT)
Dept: GENERAL RADIOLOGY | Age: 83
DRG: 853 | End: 2018-04-30
Attending: PHYSICIAN ASSISTANT
Payer: MEDICARE

## 2018-04-30 LAB
ALBUMIN SERPL-MCNC: 2.3 G/DL (ref 3.5–5)
ANION GAP SERPL CALC-SCNC: 6 MMOL/L (ref 5–15)
BACTERIA SPEC CULT: ABNORMAL
BACTERIA SPEC CULT: ABNORMAL
BASOPHILS # BLD: 0 K/UL (ref 0–0.1)
BASOPHILS NFR BLD: 0 % (ref 0–1)
BUN SERPL-MCNC: 39 MG/DL (ref 6–20)
BUN/CREAT SERPL: 32 (ref 12–20)
CALCIUM SERPL-MCNC: 8.7 MG/DL (ref 8.5–10.1)
CHLORIDE SERPL-SCNC: 102 MMOL/L (ref 97–108)
CO2 SERPL-SCNC: 31 MMOL/L (ref 21–32)
CREAT SERPL-MCNC: 1.21 MG/DL (ref 0.55–1.02)
DIFFERENTIAL METHOD BLD: ABNORMAL
EOSINOPHIL # BLD: 0 K/UL (ref 0–0.4)
EOSINOPHIL NFR BLD: 0 % (ref 0–7)
ERYTHROCYTE [DISTWIDTH] IN BLOOD BY AUTOMATED COUNT: 23.1 % (ref 11.5–14.5)
GLUCOSE BLD STRIP.AUTO-MCNC: 166 MG/DL (ref 65–100)
GLUCOSE BLD STRIP.AUTO-MCNC: 188 MG/DL (ref 65–100)
GLUCOSE BLD STRIP.AUTO-MCNC: 234 MG/DL (ref 65–100)
GLUCOSE BLD STRIP.AUTO-MCNC: 258 MG/DL (ref 65–100)
GLUCOSE SERPL-MCNC: 237 MG/DL (ref 65–100)
HCT VFR BLD AUTO: 30.4 % (ref 35–47)
HGB BLD-MCNC: 9.3 G/DL (ref 11.5–16)
IMM GRANULOCYTES # BLD: 0.2 K/UL (ref 0–0.04)
IMM GRANULOCYTES NFR BLD AUTO: 1 % (ref 0–0.5)
LYMPHOCYTES # BLD: 1.7 K/UL (ref 0.8–3.5)
LYMPHOCYTES NFR BLD: 9 % (ref 12–49)
MAGNESIUM SERPL-MCNC: 1.8 MG/DL (ref 1.6–2.4)
MCH RBC QN AUTO: 28.9 PG (ref 26–34)
MCHC RBC AUTO-ENTMCNC: 30.6 G/DL (ref 30–36.5)
MCV RBC AUTO: 94.4 FL (ref 80–99)
MONOCYTES # BLD: 1.5 K/UL (ref 0–1)
MONOCYTES NFR BLD: 8 % (ref 5–13)
NEUTS SEG # BLD: 15.9 K/UL (ref 1.8–8)
NEUTS SEG NFR BLD: 82 % (ref 32–75)
NRBC # BLD: 0.09 K/UL (ref 0–0.01)
NRBC BLD-RTO: 0.5 PER 100 WBC
PHOSPHATE SERPL-MCNC: 2.6 MG/DL (ref 2.6–4.7)
PLATELET # BLD AUTO: 194 K/UL (ref 150–400)
PMV BLD AUTO: 10.6 FL (ref 8.9–12.9)
POTASSIUM SERPL-SCNC: 4.6 MMOL/L (ref 3.5–5.1)
RBC # BLD AUTO: 3.22 M/UL (ref 3.8–5.2)
RBC MORPH BLD: ABNORMAL
SERVICE CMNT-IMP: ABNORMAL
SODIUM SERPL-SCNC: 139 MMOL/L (ref 136–145)
WBC # BLD AUTO: 19.3 K/UL (ref 3.6–11)

## 2018-04-30 PROCEDURE — 36415 COLL VENOUS BLD VENIPUNCTURE: CPT | Performed by: NURSE PRACTITIONER

## 2018-04-30 PROCEDURE — 92610 EVALUATE SWALLOWING FUNCTION: CPT

## 2018-04-30 PROCEDURE — 97168 OT RE-EVAL EST PLAN CARE: CPT

## 2018-04-30 PROCEDURE — 94640 AIRWAY INHALATION TREATMENT: CPT

## 2018-04-30 PROCEDURE — 65610000006 HC RM INTENSIVE CARE

## 2018-04-30 PROCEDURE — 74011636637 HC RX REV CODE- 636/637: Performed by: HOSPITALIST

## 2018-04-30 PROCEDURE — 74011250636 HC RX REV CODE- 250/636: Performed by: INTERNAL MEDICINE

## 2018-04-30 PROCEDURE — 74011250637 HC RX REV CODE- 250/637: Performed by: INTERNAL MEDICINE

## 2018-04-30 PROCEDURE — 77010033711 HC HIGH FLOW OXYGEN

## 2018-04-30 PROCEDURE — 74011000258 HC RX REV CODE- 258: Performed by: INTERNAL MEDICINE

## 2018-04-30 PROCEDURE — 80069 RENAL FUNCTION PANEL: CPT | Performed by: NURSE PRACTITIONER

## 2018-04-30 PROCEDURE — 74011000250 HC RX REV CODE- 250: Performed by: NURSE PRACTITIONER

## 2018-04-30 PROCEDURE — 71045 X-RAY EXAM CHEST 1 VIEW: CPT

## 2018-04-30 PROCEDURE — 74011250637 HC RX REV CODE- 250/637: Performed by: HOSPITALIST

## 2018-04-30 PROCEDURE — 97530 THERAPEUTIC ACTIVITIES: CPT

## 2018-04-30 PROCEDURE — G8978 MOBILITY CURRENT STATUS: HCPCS

## 2018-04-30 PROCEDURE — 97164 PT RE-EVAL EST PLAN CARE: CPT

## 2018-04-30 PROCEDURE — 82962 GLUCOSE BLOOD TEST: CPT

## 2018-04-30 PROCEDURE — G8979 MOBILITY GOAL STATUS: HCPCS

## 2018-04-30 PROCEDURE — 83735 ASSAY OF MAGNESIUM: CPT | Performed by: NURSE PRACTITIONER

## 2018-04-30 PROCEDURE — 85025 COMPLETE CBC W/AUTO DIFF WBC: CPT | Performed by: PHYSICIAN ASSISTANT

## 2018-04-30 PROCEDURE — 74011250636 HC RX REV CODE- 250/636: Performed by: HOSPITALIST

## 2018-04-30 PROCEDURE — 74011000250 HC RX REV CODE- 250: Performed by: INTERNAL MEDICINE

## 2018-04-30 PROCEDURE — 94669 MECHANICAL CHEST WALL OSCILL: CPT

## 2018-04-30 PROCEDURE — 74011250637 HC RX REV CODE- 250/637: Performed by: NURSE PRACTITIONER

## 2018-04-30 RX ORDER — CARVEDILOL 3.12 MG/1
3.12 TABLET ORAL 2 TIMES DAILY WITH MEALS
Status: DISCONTINUED | OUTPATIENT
Start: 2018-04-30 | End: 2018-05-01

## 2018-04-30 RX ORDER — GUAIFENESIN 100 MG/5ML
81 LIQUID (ML) ORAL DAILY
Status: DISCONTINUED | OUTPATIENT
Start: 2018-04-30 | End: 2018-05-11 | Stop reason: HOSPADM

## 2018-04-30 RX ORDER — POTASSIUM CHLORIDE 750 MG/1
40 TABLET, FILM COATED, EXTENDED RELEASE ORAL
Status: COMPLETED | OUTPATIENT
Start: 2018-04-30 | End: 2018-04-30

## 2018-04-30 RX ADMIN — ACETYLCYSTEINE 200 MG: 200 SOLUTION ORAL; RESPIRATORY (INHALATION) at 09:32

## 2018-04-30 RX ADMIN — Medication 10 ML: at 07:26

## 2018-04-30 RX ADMIN — FOLIC ACID 1 MG: 1 TABLET ORAL at 08:27

## 2018-04-30 RX ADMIN — Medication 10 ML: at 13:10

## 2018-04-30 RX ADMIN — Medication 10 ML: at 23:02

## 2018-04-30 RX ADMIN — Medication 10 ML: at 23:01

## 2018-04-30 RX ADMIN — ACETYLCYSTEINE 200 MG: 200 SOLUTION ORAL; RESPIRATORY (INHALATION) at 13:21

## 2018-04-30 RX ADMIN — POTASSIUM CHLORIDE 40 MEQ: 750 TABLET, FILM COATED, EXTENDED RELEASE ORAL at 10:15

## 2018-04-30 RX ADMIN — LEVOFLOXACIN 750 MG: 5 INJECTION, SOLUTION INTRAVENOUS at 16:26

## 2018-04-30 RX ADMIN — INSULIN LISPRO 2 UNITS: 100 INJECTION, SOLUTION INTRAVENOUS; SUBCUTANEOUS at 16:34

## 2018-04-30 RX ADMIN — Medication 10 ML: at 10:15

## 2018-04-30 RX ADMIN — Medication 10 ML: at 18:00

## 2018-04-30 RX ADMIN — VITAMIN D, TAB 1000IU (100/BT) 1000 UNITS: 25 TAB at 08:23

## 2018-04-30 RX ADMIN — ACETYLCYSTEINE 200 MG: 200 SOLUTION ORAL; RESPIRATORY (INHALATION) at 20:39

## 2018-04-30 RX ADMIN — MEROPENEM 500 MG: 500 INJECTION, POWDER, FOR SOLUTION INTRAVENOUS at 13:04

## 2018-04-30 RX ADMIN — HYDRALAZINE HYDROCHLORIDE 25 MG: 25 TABLET ORAL at 23:01

## 2018-04-30 RX ADMIN — Medication 1 CAPSULE: at 08:28

## 2018-04-30 RX ADMIN — HYDRALAZINE HYDROCHLORIDE 25 MG: 25 TABLET ORAL at 16:00

## 2018-04-30 RX ADMIN — Medication 400 MG: at 08:23

## 2018-04-30 RX ADMIN — IPRATROPIUM BROMIDE AND ALBUTEROL SULFATE 3 ML: .5; 3 SOLUTION RESPIRATORY (INHALATION) at 09:32

## 2018-04-30 RX ADMIN — ASPIRIN 81 MG 81 MG: 81 TABLET ORAL at 08:27

## 2018-04-30 RX ADMIN — Medication 20 ML: at 10:15

## 2018-04-30 RX ADMIN — INSULIN LISPRO 5 UNITS: 100 INJECTION, SOLUTION INTRAVENOUS; SUBCUTANEOUS at 05:00

## 2018-04-30 RX ADMIN — METOPROLOL TARTRATE 5 MG: 5 INJECTION, SOLUTION INTRAVENOUS at 07:25

## 2018-04-30 RX ADMIN — INSULIN LISPRO 3 UNITS: 100 INJECTION, SOLUTION INTRAVENOUS; SUBCUTANEOUS at 08:35

## 2018-04-30 RX ADMIN — IPRATROPIUM BROMIDE AND ALBUTEROL SULFATE 3 ML: .5; 3 SOLUTION RESPIRATORY (INHALATION) at 03:38

## 2018-04-30 RX ADMIN — CARVEDILOL 3.12 MG: 3.12 TABLET, FILM COATED ORAL at 16:25

## 2018-04-30 RX ADMIN — OXYCODONE HYDROCHLORIDE AND ACETAMINOPHEN 500 MG: 500 TABLET ORAL at 08:23

## 2018-04-30 RX ADMIN — IPRATROPIUM BROMIDE AND ALBUTEROL SULFATE 3 ML: .5; 3 SOLUTION RESPIRATORY (INHALATION) at 00:14

## 2018-04-30 RX ADMIN — HYDRALAZINE HYDROCHLORIDE 25 MG: 25 TABLET ORAL at 08:27

## 2018-04-30 RX ADMIN — EPOETIN ALFA 10000 UNITS: 10000 SOLUTION INTRAVENOUS; SUBCUTANEOUS at 13:07

## 2018-04-30 RX ADMIN — IPRATROPIUM BROMIDE AND ALBUTEROL SULFATE 3 ML: .5; 3 SOLUTION RESPIRATORY (INHALATION) at 20:38

## 2018-04-30 RX ADMIN — FLUCONAZOLE 100 MG: 2 INJECTION INTRAVENOUS at 12:00

## 2018-04-30 RX ADMIN — ANASTROZOLE 1 MG: 1 TABLET, COATED ORAL at 08:27

## 2018-04-30 RX ADMIN — SPIRONOLACTONE 25 MG: 25 TABLET, FILM COATED ORAL at 08:27

## 2018-04-30 RX ADMIN — BUDESONIDE 500 MCG: 0.5 INHALANT RESPIRATORY (INHALATION) at 20:39

## 2018-04-30 RX ADMIN — THERA TABS 1 TABLET: TAB at 08:23

## 2018-04-30 RX ADMIN — LEVOTHYROXINE SODIUM 88 MCG: 88 TABLET ORAL at 07:26

## 2018-04-30 RX ADMIN — DEXTROSE MONOHYDRATE AND POTASSIUM CHLORIDE: 5; .149 INJECTION, SOLUTION INTRAVENOUS at 08:22

## 2018-04-30 RX ADMIN — IPRATROPIUM BROMIDE AND ALBUTEROL SULFATE 3 ML: .5; 3 SOLUTION RESPIRATORY (INHALATION) at 13:21

## 2018-04-30 RX ADMIN — IPRATROPIUM BROMIDE AND ALBUTEROL SULFATE 3 ML: .5; 3 SOLUTION RESPIRATORY (INHALATION) at 17:27

## 2018-04-30 RX ADMIN — MEROPENEM 500 MG: 500 INJECTION, POWDER, FOR SOLUTION INTRAVENOUS at 01:02

## 2018-04-30 RX ADMIN — BUDESONIDE 500 MCG: 0.5 INHALANT RESPIRATORY (INHALATION) at 09:32

## 2018-04-30 RX ADMIN — ACETYLCYSTEINE 200 MG: 200 SOLUTION ORAL; RESPIRATORY (INHALATION) at 17:27

## 2018-04-30 RX ADMIN — CARVEDILOL 3.12 MG: 3.12 TABLET, FILM COATED ORAL at 08:27

## 2018-04-30 NOTE — PROGRESS NOTES
ID Progress Note  2018    Subjective:     She remains weak. She has needed bipap intermittently. She has no specific complaints. Objective:     Antibiotics:  1. Merrem  2. Levaquin  3. Fluconazole      Vitals:   Visit Vitals    /57    Pulse 84    Temp 98.1 °F (36.7 °C)    Resp 21    Ht 5' 1\" (1.549 m)    Wt 58.3 kg (128 lb 8.5 oz)    SpO2 99%    BMI 24.29 kg/m2        Tmax:  Temp (24hrs), Av.4 °F (36.9 °C), Min:97.9 °F (36.6 °C), Max:99.1 °F (37.3 °C)      Exam:  Lungs:  rhonchi R anterior  Heart:  regular rate and rhythm  Abdomen:  soft, non-tender. Bowel sounds normal. No masses,  no organomegaly  Skin:  no rash or abnormalities    Labs:      Recent Labs      18   1046  18   0904  18   1432  18   0416   WBC  19.3*   --    --   17.5*   HGB  9.3*   --    --   11.0*   PLT  194   --    --   320   BUN  39*  43*  51*  48*   CREA  1.21*  1.39*  1.44*  1.29*       Cultures:     No results found for: [facility]  Lab Results   Component Value Date/Time    Culture result: LIGHT APPARENT CANDIDA ALBICANS (A) 2018 09:10 AM    Culture result: NO NORMAL RESPIRATORY BERYL ISOLATED 2018 09:10 AM    Culture result: MODERATE APPARENT CANDIDA ALBICANS (A) 2018 09:40 AM    Culture result: RARE NORMAL RESPIRATORY BERYL 2018 09:40 AM    Culture result: HEAVY CANDIDA ALBICANS (A) 2018 09:40 AM    Culture result:  2018 09:40 AM     CULTURE WILL BE HELD FOR 4 WEEKS. IF THERE IS ADDITIONAL FUNGAL GROWTH, A NEW REPORT WILL FOLLOW. Radiology: X ray stable    Line/Insert Date:           Assessment:     1. Pneumonia  2. Metastatic cancer  3. Debility  4. Candida from sputum (uncertain significance)  5. Leukocytosis--WBC up and down    Objective:     1. Continue current therapy  2.  Follow up pending cultures and studies    Aniya Sylvester MD

## 2018-04-30 NOTE — PROGRESS NOTES
0227: Dr. Mata Light paged regarding low urine output from patient. 3173: Spoke with Dr. Mata Light and informed him of patient and urine output. Orders received to increase IV fluids to 75 ml/hr. 0400: Urine output increasing since IV fluid rate increased. Shift Summary:. Patient had an uneventful shift. VSS. Alert and oriented x 4. Follows commands. Denies pain.

## 2018-04-30 NOTE — PROGRESS NOTES
Problem: Dysphagia (Adult)  Goal: *Acute Goals and Plan of Care (Insert Text)  Speech pathology goals  Initiated 4/30/2018  1. Patient will tolerate regular/thin liquid diet with no overt s/s aspiration within 7 days  2. Patient will participate in [place] [address] as clinically indicated  Speech LAnguage Pathology bedside swallow evaluation  Patient: Kelby Werner [de-identified]82 y.o. female)  Date: 4/30/2018  Primary Diagnosis: SOB (shortness of breath)  colon  Anemia  COLON    4 Days Post-Op   Precautions: swallow  Fall    ASSESSMENT :  Patient on 20L, 35% HFNC with O2 sats 100% and RR high teens to low 20's. Patient alert, appropriate, oriented x4. Based on the objective data described below, the patient presents with mild oropharyngeal dysphagia. Oral dysphagia is characterized by intermittent oral holding, likely related to respiratory status. Patient also with suspected premature spillage of thin liquid when used as liquid wash after solid trial. Pharyngeal dysphagia characterized by delayed swallow initiation, however suspect WFL hyolaryngeal elevation/excursion. Patient with weak cough x1 with thin liquid via straw as liquid wash after solid trial, however no additional s/s aspiration observed, even with thin liquid via cup sip as liquid wash. Note MD concerned for silent aspiration, and can complete MBS to rule out silent aspiration if MD desires. Patient is certainly at risk for aspiration secondary to respiratory status. Discussed with RN, patient, and son present at bedside. Patient will benefit from skilled intervention to address the above impairments.   Patients rehabilitation potential is considered to be Fair  Factors which may influence rehabilitation potential include:   []            None noted  []            Mental ability/status  [x]            Medical condition  []            Home/family situation and support systems  []            Safety awareness  []            Pain tolerance/management  []            Other: PLAN :  Recommendations and Planned Interventions:  --Continue regular/thin liquid diet, with son cutting up solids as needed (patient's baseline due to dentition)  --NO STRAWS  --Upright for all PO intake  --Hold PO if change in vital signs or decline in respiratory status noted  --SLP to follow for diet tolerance, and MBS to rule out silent aspiration if MD desires  Frequency/Duration: Patient will be followed by speech-language pathology 3 times a week to address goals. Discharge Recommendations: To Be Determined     SUBJECTIVE:   Patient stated It's good after SLP educated patient regarding results and recommendations.     OBJECTIVE:     Past Medical History:   Diagnosis Date    Anemia 9/2/2009    Colon cancer ([facility] 9/2/2009    surgery/chemo    Colon cancer ([facility] [facility] 9/2/2009    DM (diabetes mellitus) ([facility] [facility] 9/2/2009    GERD (gastroesophageal reflux disease)     H/O: CVA 9/2/2009    slight l sided weakness    Hypothyroid 9/2/2009    Ill-defined condition     seasonal allergies    Microalbuminuria 9/2/2009    Osteoporosis 9/2/2009    Other and unspecified hyperlipidemia 1/27/2010    Rheumatoid arthritis involving ankle ([facility] [address] 9/28/2016    Rheumatoid arthritis(714.0) 9/2/2009    Unspecified essential hypertension 9/2/2009    Weakness due to cerebrovascular accident      Past Surgical History:   Procedure Laterality Date    HX COLECTOMY      HX HYSTERECTOMY      HX OTHER SURGICAL      colonoscopies numerous since 1993     Prior Level of Function/Home Situation:   Home Situation  Home Environment: Private residence  # Steps to Enter: 4  One/Two Story Residence: Two story, live on 1st floor  Living Alone: Yes  Support Systems: Home care staff, Child(madi), Family member(s)  Patient Expects to be Discharged to[de-identified] Private residence  Current DME Used/Available at Home: Alfonse Campbell, rollator, Safety frame [employer], Shower chair  Tub or Shower Type: Tub/Shower combination  Diet prior to admission: mechanical soft/thin liquid  Current Diet:  Regular/thin   Cognitive and Communication Status:  Neurologic State: Alert, Appropriate for age  Orientation Level: Oriented X4  Cognition: Follows commands, Appropriate for age attention/concentration  Perception: Appears intact  Perseveration: No perseveration noted  Safety/Judgement: Awareness of environment  Oral Assessment:  Oral Assessment  Labial: Right droop (mild)  Dentition: Natural;Full  Oral Hygiene: moist oral mucosa  Lingual: Right deviation (mild)  Velum: Unable to visualize  Mandible: No impairment  P.O. Trials:  Patient Position: upright in bed  Vocal quality prior to P.O.: Low volume  Consistency Presented: Ice chips; Thin liquid;Puree; Solid  How Presented: Self-fed/presented;Cup/sip;SLP-fed/presented;Spoon;Straw     Bolus Acceptance: No impairment  Bolus Formation/Control: Impaired  Type of Impairment: Premature spillage; Other (comment) (oral holding)  Propulsion: Delayed (# of seconds)  Oral Residue: None  Initiation of Swallow: Delayed (# of seconds)  Laryngeal Elevation: Functional  Aspiration Signs/Symptoms: Weak cough (x1 with thin liquid wash (straw) after solid trial)  Pharyngeal Phase Characteristics: No impairment, issues, or problems   Effective Modifications: Cup/sip;Small sips and bites  Cues for Modifications: Minimal       Oral Phase Severity: Mild  Pharyngeal Phase Severity : Mild    NOMS:   The NOMS functional outcome measure was used to quantify this patient's level of swallowing impairment. Based on the NOMS, the patient was determined to be at level 5 for swallow function     G Codes: In compliance with CMSs Claims Based Outcome Reporting, the following G-code set was chosen for this patient based the use of the NOMS functional outcome to quantify this patient's level of swallowing impairment. Using the NOMS, the patient was determined to be at level 5 for swallow function which correlates with the CJ= 20-39% level of severity.     Based on the objective assessment provided within this note, the current, goal, and discharge g-codes are as follows:    Swallow  Swallowing:   Swallow Current Status CJ= 20-39%   Swallow Goal Status CJ= 20-39%      NOMS Swallowing Levels:  Level 1 (CN): NPO  Level 2 (CM): NPO but takes consistency in therapy  Level 3 (CL): Takes less than 50% of nutrition p.o. and continues with nonoral feedings; and/or safe with mod cues; and/or max diet restriction  Level 4 (CK): Safe swallow but needs mod cues; and/or mod diet restriction; and/or still requires some nonoral feeding/supplements  Level 5 (CJ): Safe swallow with min diet restriction; and/or needs min cues  Level 6 (CI): Independent with p.o.; rare cues; usually self cues; may need to avoid some foods or needs extra time  Level 7 ([address] [place] [address]): Independent for all p.o.  ERI. (2003). National Outcomes Measurement System (NOMS): Adult Speech-Language Pathology User's Guide. Pain:  Pain Scale 1: Numeric (0 - 10)  Pain Intensity 1: 0     After treatment:   []            Patient left in no apparent distress sitting up in chair  [x]            Patient left in no apparent distress in bed  [x]            Call bell left within reach  [x]            Nursing notified  [x]            Caregiver present  []            Bed alarm activated    COMMUNICATION/EDUCATION:   The patients plan of care including recommendations, planned interventions, and recommended diet changes were discussed with: Registered Nurse. [x]            Patient/family have participated as able in goal setting and plan of care. [x]            Patient/family agree to work toward stated goals and plan of care. []            Patient understands intent and goals of therapy, but is neutral about his/her participation. []            Patient is unable to participate in goal setting and plan of care.     Thank you for this referral.  Jesse Fishman, SLP  Time Calculation: 20 mins

## 2018-04-30 NOTE — PROGRESS NOTES
Problem: Mobility Impaired (Adult and Pediatric)  Goal: *Acute Goals and Plan of Care (Insert Text)  Physical Therapy Goals  Initiated 4/30/2018  1. Patient will move from supine to sit and sit to supine , scoot up and down and roll side to side in bed with moderate assistance  within 7 day(s). 2.  Patient will transfer from bed to chair and chair to bed with moderate assistance  using the least restrictive device within 7 day(s). 3.  Patient will perform sit to stand with moderate assistance  within 7 day(s). 4.  Patient will ambulate with moderate assistance  for 25 feet with the least restrictive device within 7 day(s). 5.  Patient will tolerate OOB in chair x 30 minutes for improved activity tolerance within 7 days. Physical Therapy Goals  Initiated 4/20/2018  1. Patient will move from supine to sit and sit to supine , scoot up and down and roll side to side in bed with minimal assistance/contact guard assist within 7 day(s). 2.  Patient will transfer from bed to chair and chair to bed with minimal assistance/contact guard assist using the least restrictive device within 7 day(s). 3.  Patient will perform sit to stand with minimal assistance/contact guard assist within 7 day(s). 4.  Patient will ambulate with minimal assistance/contact guard assist for 25 feet with the least restrictive device within 7 day(s). 5.  Patient will ascend/descend 3 stairs with 2 handrail(s) with minimal assistance/contact guard assist within 7 day(s). physical Therapy REEVALUATION  Patient: Fortunato Villagran (82 y.o. female)  Date: 4/30/2018  Primary Diagnosis: SOB (shortness of breath)  colon  Anemia  COLON    4 Days Post-Op   Precautions:   Fall, DNR  Chart, physical therapy assessment, plan of care and goals were reviewed.     ASSESSMENT :  Based on the objective data described below, the patient presents with generalized weakness although L weaker than R from previous CVA, lethargy, decreased activity tolerance, and overall decreased independence from baseline. PTA patient was mod I with rollator although had in home aides to assist with household tasks. Last week patient was ambulating short distances with therapy and min A x 2. Son present and initially resistant to therapist waking patient to participate but RN present and needing to provide medication. Patient aroused to verbal cues and touch, become more alert with time and able to perform AROM of LE's in bed. Tolerated slight bed in chair position as well with VSS. Remains on hiflow and fatigues quickly. Encouraged patient and son to continue performing AROM of extremities and to increase tolerance of bed in chair to 30 minute increments for taking medications and meals-son agreeable. At this time, recommending rehab placement at [facility] [place] also agreeable to this. Will continue to follow to progress mobility. Patient will benefit from skilled intervention to address the above impairments.   Patients rehabilitation potential is considered to be Fair  Factors which may influence rehabilitation potential include:   []           None noted  []           Mental ability/status  [x]           Medical condition  []           Home/family situation and support systems  []           Safety awareness  []           Pain tolerance/management  []           Other:      PLAN :  Recommendations and Planned Interventions:  [x]             Bed Mobility Training             [x]      Neuromuscular Re-Education  [x]             Transfer Training                   []      Orthotic/Prosthetic Training  [x]             Gait Training                         []      Modalities  [x]             Therapeutic Exercises           []      Edema Management/Control  [x]             Therapeutic Activities            [x]      Patient and Family Training/Education  []             Other (comment):  Frequency/Duration: Patient will be followed by physical therapy 5 times a week to address goals.  Discharge Recommendations: Rehab   Further Equipment Recommendations for Discharge: TBD     SUBJECTIVE:   Patient stated I'm a little short of breath.   VSS on Hiflow.     OBJECTIVE DATA SUMMARY:     Past Medical History:   Diagnosis Date    Anemia 9/2/2009    Colon cancer ([facility] [address] 9/2/2009    surgery/chemo    [facility] 9/2/2009    DM (diabetes mellitus) ([facility] [address] 9/2/2009    GERD (gastroesophageal reflux disease)     H/O: CVA 9/2/2009    slight l sided weakness    Hypothyroid 9/2/2009    Ill-defined condition     seasonal allergies    Microalbuminuria 9/2/2009    Osteoporosis 9/2/2009    Other and unspecified hyperlipidemia 1/27/2010    Rheumatoid arthritis involving ankle ([facility] [address] 9/28/2016    Rheumatoid arthritis(714.0) 9/2/2009    Unspecified essential hypertension 9/2/2009    Weakness due to cerebrovascular accident      Past Surgical History:   Procedure Laterality Date    HX COLECTOMY      HX HYSTERECTOMY      HX OTHER SURGICAL      colonoscopies numerous since [address] course since last seen and reason for reevaluation: re-consulted after order completed by MD  Critical Behavior:  Neurologic State: Alert  Orientation Level: Oriented X4  Cognition: Decreased command following, Decreased attention/concentration  Safety/Judgement: Awareness of environment  Skin:    Strength:    Strength: Generally decreased, functional (L LE decreased compared to R h/o CVA)                      Tone & Sensation:   Tone: Normal              Sensation: Intact               Range Of Motion:  AROM: Generally decreased, functional           PROM: Within functional limits           Coordination:  Coordination: Generally decreased, functional    Functional Mobility:  Bed Mobility:              Transfers:                          Balance:   Sitting:  (intact supported in bed)  Ambulation/Gait Training:                                                        Therapeutic Exercises:       Functional Measure:  Functional Reach:    Completed:  [] standing       [x] seated           Average: 0       Functional Reach Test and G-code impairment scale: For inches: Measure in cm and divide by 2.54  Percentage of Impairment CH    0%   CI    1-19% CJ    20-39% CK    40-59% CL    60-79% CM    80-99% CN     100%   Functional Reach  Score 15-25 cm 25 24 22-23 20-21 18-19 16-17 < 15   Functional Reach  Score 6-10 in 10      < 6        Functional reach: (standing)  Reaches £  6 in = High Fall Risk  Reaches 6-10 in = Moderate Fall Risk    Reaches ³  10 in = Low Fall Risk  Cr Au et al. Functional reach: a new clinical measure of balance. [facility] Gomez; 39: [id number]. Modified Functional Reach: (seated)  Age: Men: Women:   21-39 17.9\" 17.6\"   40-59 17.5\" 15.9\"   60-79 14.4\" 13.2\"   80-97 14.0\" 12.5\"       Jarek Yu. Forward and Lateral Sitting Functional Reach in Younger, Middle-aged, and Older Adults. J Geriatric Phys Ther. 2007; 30:43-48         G codes: In compliance with CMSs Claims Based Outcome Reporting, the following G-code set was chosen for this patient based on their primary functional limitation being treated: The outcome measure chosen to determine the severity of the functional limitation was the Functional Reach with a score of 0 which was correlated with the impairment scale. ? Mobility - Walking and Moving Around:     - CURRENT STATUS: CN - 100% impaired, limited or restricted    - GOAL STATUS: CM - 80%-99% impaired, limited or restricted    - D/C STATUS:  ---------------To be determined---------------     Pain:  Pain Scale 1: Numeric (0 - 10)  Pain Intensity 1: 0              Activity Tolerance:   VSS   Please refer to the flowsheet for vital signs taken during this treatment.   After treatment:   []  Patient left in no apparent distress sitting up in chair  [x]  Patient left in no apparent distress in bed-modified chair  [x]  Call bell left within reach  [x]  Nursing notified  [x]  Caregiver present  []  Bed alarm activated    COMMUNICATION/EDUCATION:   The patients plan of care was discussed with: Registered Nurse. [x]  Fall prevention education was provided and the patient/caregiver indicated understanding. [x]  Patient/family have participated as able in goal setting and plan of care. [x]  Patient/family agree to work toward stated goals and plan of care. []  Patient understands intent and goals of therapy, but is neutral about his/her participation. []  Patient is unable to participate in goal setting and plan of care.     Thank you for this referral.  Michael Harper, PT   Time Calculation: 23 mins

## 2018-04-30 NOTE — PROGRESS NOTES
Cardiology Progress Note            Admit Date: 4/16/2018  Admit Diagnosis: SOB (shortness of breath)  colon  Anemia  COLON  Date: 4/30/2018     Time: 2:01 PM    HPI: 82 y.o. Female with new diagnosis of CHF. Presented with chief c/o of SOB, found to have pulmonary edema and EF 40% on TTE. Noted to have NSTEMI and JEROD on CKD. Pt with hx of colon cancer and now concern for metastatic breast CA. Was in ICU over weekend (4/21-4/22) for respiratory failure, sepsis, psa UTI. Improved and transferred to floor 4/24. LHC on hold as probable metastatic disease. Transferred to ICU on 4/23 due to AMS and SOB. Started on nitroglycerine IV for BP control. Left lung opacification on a.m. Chest XRay 4/25/18 - mucous plug, had therapeutic bronch. Repeat CXR 4/26 with opacification of Left lung noted- therapeutic bronch repeated. Subjective:  Denies chest pain. States breathing is a little better. Denies pain. Still requiring hi-flow O2. Decreased urine output overnight so IVF were started. Had brief run of SVT last night. Assessment and Plan     1. NSTEMI:     -Remains with no chest pain. Medical mgmt as able given other issues. -Repeat TTE:4/27/18  EF unchanged 45% with mild hypokinesis  basal-mid anteroseptal and apical walls.   -Held ASA and Plavix on 4/26 d/t worsened anemia.  hgb 11 on 4/28- will restart ASA   -Change IV metoprolol to coreg 3.125 mg BID and titrate up as able   -Statin intolerant due to weakness on zocor, on repatha, last LDL 31, so will not rechallenge statin     2. Cardiomyopathy/Acute HFrEF, new:  EF 45% NYHA IV     -CXR with persistent congestive changes, basilar airspace disease and sm arjun pleural effusions. Improved JM aeration.    -On  aldactone for now - a.m.  Labs pending   -Holding ARB/ACE-I d/t elevated creatinine   -change BB to coreg 3.125 mg BID and titrate up   -Daily weights, I/Os (net+ 9230 mls/24 hours). -Will defer diuretics to nephrology. 3. JEROD on CKD:   A.m. Labs pending.   -Nephrology following: has large calcified atherosclerotic lesion of Left renal artery ostium, CT was done without contrast to determine the severity of SONALI   -diuretics per nephrology. 4. Acute respiratory failure: on high yessica O2   -diuretics as per nephrology   -Pulmonary following-    5. Anemia:hgb 11 on 4/28. On procrit. 7. HTN- BP  Low overnight, now normalized   -Coreg 3.125 mg BID    -Hydralazine 25 mg TID    8. Leukocytosis/ pseudomonal UTI: ID on consult     9. Hx of Colon cancer, now metastatic breast ca (bone, liver,calvarium mets)  -Hematology oncology following. 10  Hypokalemia/Hypomagnesemia:  Labs pending this a.m.    11. Hypernatremia:  Sodium trending back down (now 146) on D5W. Management per primary team.    12. Advanced directives:  No shock or CPR, DNR but ?intubation still ok? Palliative care following. BP low overnight, U/O low overnight, IVfluids started per primary team. Hypernatremia improving. BP now normalized. Will change BB to po and titrate up. Will defer diuretics to nephrology. Restart ASA. Saw and evaluated pt and agree with above assessment and plan. HR ok in afib, po BB; titrate prn. Diuretic per renal.  Dr. Leonid France to follow. Micha Sherwood MD    Objective:      Physical Exam:                Visit Vitals    /42    Pulse 77    Temp 98.8 °F (37.1 °C)    Resp 20    Ht 5' 1\" (1.549 m)    Wt 58.3 kg (128 lb 8.5 oz)    SpO2 100%    BMI 24.29 kg/m2          General Appearance:   elderly female, NAD   Ears/Nose/Mouth/Throat:    Hearing grossly normal.         Neck:  Supple. Chest:     Few Course breath sounds bilaterally, on high flow O2. Cardiovascular:   Regular rate and rhythm, S1, S2 normal, no murmur,    Abdomen:    Soft, mild ttp, no gaurding. Extremities:  No edema bilaterally. Skin:  Warm and dry.      Telemetry: Sinus rhythm- Brief run of SVT overnight.            Data Review:    Labs:    Recent Results (from the past 24 hour(s))   RENAL FUNCTION PANEL    Collection Time: 04/29/18  9:04 AM   Result Value Ref Range    Sodium 146 (H) 136 - 145 mmol/L    Potassium 3.3 (L) 3.5 - 5.1 mmol/L    Chloride 105 97 - 108 mmol/L    CO2 34 (H) 21 - 32 mmol/L    Anion gap 7 5 - 15 mmol/L    Glucose 209 (H) 65 - 100 mg/dL    BUN 43 (H) 6 - 20 MG/DL    Creatinine 1.39 (H) 0.55 - 1.02 MG/DL    BUN/Creatinine ratio 31 (H) 12 - 20      GFR est AA 44 (L) >60 ml/min/1.73m2    GFR est non-AA 36 (L) >60 ml/min/1.73m2    Calcium 8.5 8.5 - 10.1 MG/DL    Phosphorus 2.6 2.6 - 4.7 MG/DL    Albumin 2.4 (L) 3.5 - 5.0 g/dL   GLUCOSE, POC    Collection Time: 04/29/18 11:11 AM   Result Value Ref Range    Glucose (POC) 224 (H) 65 - 100 mg/dL    Performed by [facility] [place]    GLUCOSE, POC    Collection Time: 04/29/18  4:19 PM   Result Value Ref Range    Glucose (POC) 223 (H) 65 - 100 mg/dL    Performed by [facility]    GLUCOSE, POC    Collection Time: 04/29/18 10:03 PM   Result Value Ref Range    Glucose (POC) 199 (H) 65 - 100 mg/dL    Performed by [facility]           Radiology:        Current Facility-Administered Medications   Medication Dose Route Frequency    spironolactone (ALDACTONE) tablet 25 mg  25 mg Oral DAILY    dextrose 5% with KCl 20 mEq/L infusion   IntraVENous CONTINUOUS    acetaminophen (TYLENOL) suppository 650 mg  650 mg Rectal Q4H PRN    hydrALAZINE (APRESOLINE) tablet 25 mg  25 mg Oral TID    levoFLOXacin (LEVAQUIN) 750 mg in D5W IVPB  750 mg IntraVENous Q48H    metoprolol (LOPRESSOR) injection 5 mg  5 mg IntraVENous Q6H    sodium chloride (NS) flush 5-10 mL  5-10 mL IntraVENous Q8H    sodium chloride (NS) flush 5-10 mL  5-10 mL IntraVENous PRN    albuterol-ipratropium (DUO-NEB) 2.5 MG-0.5 MG/3 ML  3 mL Nebulization Q4H RT    budesonide (PULMICORT) 500 mcg/2 ml nebulizer suspension  500 mcg Nebulization BID RT    acetylcysteine (MUCOMYST) 200 mg/mL (20 %) solution 200 mg  200 mg Nebulization QID RT    hydrALAZINE (APRESOLINE) 20 mg/mL injection 10 mg  10 mg IntraVENous Q6H PRN    fluconazole in 0.9% NaCl 100 mg IVPB  100 mg IntraVENous Q24H    0.9% sodium chloride infusion 250 mL  250 mL IntraVENous PRN    sodium chloride (NS) flush 20 mL  20 mL InterCATHeter PRN    sodium chloride (NS) flush 10 mL  10 mL InterCATHeter Q24H    sodium chloride (NS) flush 10 mL  10 mL InterCATHeter PRN    sodium chloride (NS) flush 10 mL  10 mL InterCATHeter Q8H    alteplase (CATHFLO) 1 mg in sterile water (preservative free) 1 mL injection  1 mg InterCATHeter PRN    bacitracin 500 unit/gram packet 1 Packet  1 Packet Topical PRN    meropenem (MERREM) 500 mg in 0.9% sodium chloride (MBP/ADV) 50 mL  0.5 g IntraVENous Q12H    sodium chloride (NS) flush 10-30 mL  10-30 mL InterCATHeter PRN    sodium chloride (NS) flush 10 mL  10 mL InterCATHeter Q24H    sodium chloride (NS) flush 10 mL  10 mL InterCATHeter PRN    sodium chloride (NS) flush 10-40 mL  10-40 mL InterCATHeter Q8H    sodium chloride (NS) flush 20 mL  20 mL InterCATHeter Q24H    anastrozole (ARIMIDEX) tablet 1 mg  1 mg Oral DAILY    0.9% sodium chloride infusion 250 mL  250 mL IntraVENous PRN    epoetin celio (EPOGEN;PROCRIT) injection 10,000 Units  10,000 Units SubCUTAneous Q MON, WED & FRI    albuterol-ipratropium (DUO-NEB) 2.5 MG-0.5 MG/3 ML  3 mL Nebulization Q6H PRN    glucose chewable tablet 16 g  4 Tab Oral PRN    dextrose (D50W) injection syrg 12.5-25 g  12.5-25 g IntraVENous PRN    glucagon (GLUCAGEN) injection 1 mg  1 mg IntraMUSCular PRN    insulin lispro (HUMALOG) injection   SubCUTAneous AC&HS    prochlorperazine (COMPAZINE) with saline injection 5 mg  5 mg IntraVENous Q8H PRN    ascorbic acid (vitamin C) (VITAMIN C) tablet 500 mg  500 mg Oral DAILY    cholecalciferol (VITAMIN D3) tablet 1,000 Units  1,000 Units Oral DAILY    folic acid (FOLVITE) tablet 1 mg  1 mg Oral DAILY    magnesium oxide (MAG-OX) tablet 400 mg  400 mg Oral DAILY    therapeutic multivitamin (THERAGRAN) tablet 1 Tab  1 Tab Oral DAILY    fish oil-omega-3 fatty acids 340-1,000 mg capsule 1 Cap  1 Cap Oral DAILY    sodium chloride (NS) flush 5-10 mL  5-10 mL IntraVENous Q8H    sodium chloride (NS) flush 5-10 mL  5-10 mL IntraVENous PRN    ondansetron (ZOFRAN) injection 4 mg  4 mg IntraVENous Q4H PRN    levothyroxine (SYNTHROID) tablet 88 mcg  88 mcg Oral ACB     Pt critically ill, poor prognosis, multiple severe issues. Raymon Esteban.  MARIETTA [place]     [facility] [address] [place] [address], [address] [place] [address] [address]   Radha [place]   [phone]

## 2018-04-30 NOTE — PROGRESS NOTES
Problem: Self Care Deficits Care Plan (Adult)  Goal: *Acute Goals and Plan of Care (Insert Text)  Occupational Therapy Goals  Initiated 4/20/2018; Reviewed 4/30/18  1. Patient will perform grooming set up assist bedside level within 7 days. 2.  Patient will perform bathing seated with moderate assistance  within 7 day(s). 3.  Patient will perform upper body dressing with minimal assistance/contact guard assist within 7 day(s). 4.  Patient will perform toilet transfers with minimal assistance/contact guard assist within 7 day(s). 5.  Patient will participate in upper extremity therapeutic exercise/activities with supervision/set-up for 10 minutes within 7 day(s). 6.  Patient will utilize energy conservation techniques during functional activities with verbal cues within 7 day(s). Occupational Therapy EVALUATION  Patient: Ludivina Cee (81 y.o. female)  Date: 4/30/2018  Primary Diagnosis: SOB (shortness of breath)  colon  Anemia  COLON    4 Days Post-Op   Precautions:  High flow 02 Fall, DNR    ASSESSMENT :  Pt cleared for OT by ARIANA Mccann. Pt received supine in bed with son Mary Lozada) and private duty caregiver present. Based on the objective data described below, the patient presents with impaired strength, drowsiness, impaired endurance and subsequently impaired independence with ADL and related mobility. Son initially requesting we not wake mother. Encouraged son regarding need for pt to increase mobility and son hesitantly agreed (pt needed to be awakened for nurse to provide medications at this time). As outlined below pts performance with ADL total to maximum assist today. Pt more alert as session progressed. Instructed pts caregiver and son regarding need for pts increased activity (AROM of extremities, bed-chair position with all PO intake with goal of 30 minutes in chair position and increasing assistance from pt for positioning in bed and self feeding).  Son is on board with OT recommendation for rehabilitation stay at next level of care (pt has daily caregivers 7 days/week but not 24 hour caregivers). Patient will benefit from skilled intervention to address the above impairments. Patients rehabilitation potential is considered to be Fair  Factors which may influence rehabilitation potential include:   []             None noted  []             Mental ability/status  [x]             Medical condition  []             Home/family situation and support systems  []             Safety awareness  []             Pain tolerance/management  []             Other:      PLAN :  Recommendations and Planned Interventions:  [x]               Self Care Training                  [x]        Therapeutic Activities  [x]               Functional Mobility Training    []        Cognitive Retraining  [x]               Therapeutic Exercises           [x]        Endurance Activities  []               Balance Training                   []        Neuromuscular Re-Education  []               Visual/Perceptual Training     [x]   Home Safety Training  [x]               Patient Education                 [x]        Family Training/Education  []               Other (comment):    Frequency/Duration: Patient will be followed by occupational therapy 5 times a week to address goals. Discharge Recommendations: Inpatient vs SNF  Further Equipment Recommendations for Discharge: TBD at rehab     SUBJECTIVE:   Patient stated Patricia 74.     OBJECTIVE DATA SUMMARY:   HISTORY:   Past Medical History:   Diagnosis Date    Anemia 9/2/2009    Colon cancer ([facility] [address] 9/2/2009    surgery/chemo    Colon cancer ([facility] 9/2/2009    DM (diabetes mellitus) ([facility] 9/2/2009    GERD (gastroesophageal reflux disease)     H/O: CVA 9/2/2009    slight l sided weakness    Hypothyroid 9/2/2009    Ill-defined condition     seasonal allergies    Microalbuminuria 9/2/2009    Osteoporosis 9/2/2009    Other and unspecified hyperlipidemia 1/27/2010    Rheumatoid arthritis involving ankle ([facility] 9/28/2016    Rheumatoid arthritis(714.0) 9/2/2009    Unspecified essential hypertension 9/2/2009    Weakness due to cerebrovascular accident      Past Surgical History:   Procedure Laterality Date    HX COLECTOMY      HX HYSTERECTOMY      HX OTHER SURGICAL      colonoscopies numerous since 1993     Expanded or extensive additional review of patient history:     Home Situation  Home Environment: Private residence  # Steps to Enter: 4  One/Two Story Residence: Two story, live on 1st floor  Living Alone: Yes  Support Systems: Home care staff, Child(madi) (Pt has private duty care givers 7day/week (not 24 hrs))  Patient Expects to be Discharged to[de-identified] Rehabilitation facility  Current DME Used/Available at Home: Safety frame [employer], Shower chair, Walker, rollator  Tub or Shower Type: Tub/Shower combination  [x]  Right hand dominant   []  Left hand dominant    EXAMINATION OF PERFORMANCE DEFICITS:  Cognitive/Behavioral Status:  Neurologic State: Alert  Orientation Level: Oriented X4  Cognition: Decreased command following;Decreased attention/concentration  Perception: Appears intact  Perseveration: No perseveration noted  Safety/Judgement: Awareness of environment    Skin: Appears intact BUE    Edema: Edema noted BUE    Hearing: Auditory  Auditory Impairment: None    Vision/Perceptual:    Tracking: Able to track stimulus in all quadrants w/o difficulty                                Range of Motion:    AROM: Generally decreased, functional (LUE weaker than RUE.  H/O L side weakness from CVA)  PROM: Within functional limits                      Strength:    Strength: Generally decreased, functional                Coordination:  Coordination: Generally decreased, functional  Fine Motor Skills-Upper: Left Impaired;Right Intact    Gross Motor Skills-Upper: Left Impaired;Right Intact    Tone & Sensation:    Tone: Normal  Sensation: Intact                      Balance:       Functional Mobility and Transfers for ADLs:  Bed Mobility:       Transfers:       ADL Assessment:  Feeding: Moderate assistance    Oral Facial Hygiene/Grooming: Maximum assistance    Bathing: Total assistance    Upper Body Dressing: Maximum assistance    Lower Body Dressing: Total assistance    Toileting: Total assistance                ADL Intervention and task modifications:               Pt more alert as session progressed. Instructed pts caregiver and son regarding need for pts increased activity (AROM of extremities, bed-chair position with all PO intake with goal of 30 minutes in chair position and increasing assistance from pt for positioning in bed and self feeding). Cognitive Retraining  Safety/Judgement: Awareness of environment    Therapeutic Exercise:  Pt completed DIMITRIOM BUE during session without c/o. Functional Measure:  Barthel Index:    Bathin  Bladder: 0  Bowels: 0  Groomin  Dressin  Feedin  Mobility: 0  Stairs: 0  Toilet Use: 0  Transfer (Bed to Chair and Back): 5  Total: 5       Barthel and G-code impairment scale:  Percentage of impairment CH  0% CI  1-19% CJ  20-39% CK  40-59% CL  60-79% CM  80-99% CN  100%   Barthel Score 0-100 100 99-80 79-60 59-40 20-39 1-19   0   Barthel Score 0-20 20 17-19 13-16 9-12 5-8 1-4 0      The Barthel ADL Index: Guidelines  1. The index should be used as a record of what a patient does, not as a record of what a patient could do. 2. The main aim is to establish degree of independence from any help, physical or verbal, however minor and for whatever reason. 3. The need for supervision renders the patient not independent. 4. A patient's performance should be established using the best available evidence. Asking the patient, friends/relatives and nurses are the usual sources, but direct observation and common sense are also important. However direct testing is not needed.   5. Usually the patient's performance over the preceding 24-48 hours is important, but occasionally longer periods will be relevant. 6. Middle categories imply that the patient supplies over 50 per cent of the effort. 7. Use of aids to be independent is allowed. Sina Cooper., Barthel, D.W. ([id number]). Functional evaluation: the Barthel Index. [address] [facility] (14)2. TIAGO Vega, Jh Acevedo., Lenord Shone., Shoaib Walls, [address] [place] [address] (1999). Measuring the change indisability after inpatient rehabilitation; comparison of the responsiveness of the Barthel Index and Functional [place] Measure. Journal of Neurology, Neurosurgery, and Psychiatry, 66(4), 999-615. Joie Suresh, N.J.A, MG Ramos.LATA, & Flex Khan M.A. (2004.) Assessment of post-stroke quality of life in cost-effectiveness studies: The usefulness of the Barthel Index and the EuroQoL-5D. Quality of Life Research, 13, 060-87       G codes: In compliance with CMSs Claims Based Outcome Reporting, the following G-code set was chosen for this patient based on their primary functional limitation being treated: The outcome measure chosen to determine the severity of the functional limitation was the Barthel Index with a score of 5/100 which was correlated with the impairment scale. ? Self Care:     - CURRENT STATUS: CM - 80%-99% impaired, limited or restricted    - GOAL STATUS: CK - 40%-59% impaired, limited or restricted    - D/C STATUS:  CM - 80%-99% impaired, limited or restricted     Occupational Therapy Evaluation Charge Determination   History Examination Decision-Making   LOW Complexity : Brief history review  HIGH Complexity : 5 or more performance deficits relating to physical, cognitive , or psychosocial skils that result in activity limitations and / or participation restrictions HIGH Complexity : Patient presents with comorbidities that affect occupational performance.  Signifigant modification of tasks or assistance (eg, physical or verbal) with assessment (s) is necessary to enable patient to complete evaluation       Based on the above components, the patient evaluation is determined to be of the following complexity level: LOW   Pain:  Pain Scale 1: Numeric (0 - 10)  Pain Intensity 1: 0              Activity Tolerance:   Vitals stable during session. Pt denied dyspnea. After treatment:   [] Patient left in no apparent distress sitting up in chair  [x] Patient left in no apparent distress in bed in chair position  [x] Call bell left within reach  [x] Nursing notified  [x] Caregiver present  [] Bed alarm activated    COMMUNICATION/EDUCATION:   The patients plan of care was discussed with: Physical Therapist and Registered Nurse and SLP. [x] Home safety education was provided and the patient/caregiver indicated understanding. [x] Patient/family have participated as able in goal setting and plan of care. [x] Patient/family agree to work toward stated goals and plan of care. [] Patient understands intent and goals of therapy, but is neutral about his/her participation. [] Patient is unable to participate in goal setting and plan of care. This patients plan of care is appropriate for delegation to [facility].     Thank you for this referral.  Kael Anguiano  Time Calculation: 23 mins

## 2018-04-30 NOTE — PROGRESS NOTES
Problem: Falls - Risk of  Goal: *Absence of Falls  Document Yaron Fall Risk and appropriate interventions in the flowsheet.    Fall Risk Interventions:  Mobility Interventions: Assess mobility with egress test, PT Consult for mobility concerns, Utilize walker, cane, or other assitive device, Utilize gait belt for transfers/ambulation, PT Consult for assist device competence    Mentation Interventions: Increase mobility, More frequent rounding, Reorient patient, Update white board, Room close to nurse's station, Evaluate medications/consider consulting pharmacy, Adequate sleep, hydration, pain control    Medication Interventions: Evaluate medications/consider consulting pharmacy, Teach patient to arise slowly, Utilize gait belt for transfers/ambulation, Patient to call before getting OOB    Elimination Interventions: Call light in reach, Toileting schedule/hourly rounds, Urinal in reach, Patient to call for help with toileting needs    History of Falls Interventions: Door open when patient unattended, Evaluate medications/consider consulting pharmacy, Room close to nurse's station, Utilize gait belt for transfer/ambulation

## 2018-04-30 NOTE — PROGRESS NOTES
Pulmonary, Critical Care, and Sleep Medicine~Progress Note    Name: Umberto Brunner MRN: [id number]   : 1935 Hospital: [address]   Date: 2018 11:37 AM Admission: 2018      Impression Plan   1. Metastatic breast cancer- new dx- left breast mass on MRI with diffuse skeletal mets. Follows with Dr. Jasiel Ordonez  2. Acute hypercarbic/hypoxic resp failure-pCO2 ok range  3. Altered mental status  4. NSTEMI - ECHO EF 40%  5. JEROD on CKD  6. GERD  7. E Coli UTI- sens pip/tazo 1. O2 titration above 90%, wean off high flow. I discussed with nursing and placed an order in for RT to do this. 2. SLP/PT/OT  3. Hemodynamically stable  4. diuresis for CHF  5. BP control   6. Correct electrolytes per primary team  7. ABX per ID  8. Patient followed by multiple specialties   9. NIV at night and PRN during the day for now  10. Chest x-ray today  11. Strong pulmonary toilet, including chest PT  12. Discussed with RN/Attending  13. Discussed with son at bedside. He is pleased with her progress and he is in complete agreement with the plan. All questions were answered to the best of my ability and his apparent satisfaction. Time was given for questions as well. 14. General internal medicine care is per the hospitalist      Daily Progression:    Looks much better today. More alert. Does not appear anxious. Taking PO appropriately. I have reviewed the labs and previous days notes. Review of systems not obtained due to patient factors.     OBJECTIVE:     Vital Signs:       Visit Vitals    /60    Pulse 79    Temp 98.1 °F (36.7 °C)    Resp 19    Ht 5' 1\" (1.549 m)    Wt 58.3 kg (128 lb 8.5 oz)    SpO2 100%    BMI 24.29 kg/m2      Temp (24hrs), Av.4 °F (36.9 °C), Min:97.9 °F (36.6 °C), Max:99.1 °F (37.3 °C)     Intake/Output:     Last shift:  0701 -  1900  In: 120 [P.O.:120]  Out: 75 [Urine:75]    Last 3 shifts:  1901 - 04/30 0700  In: 2852.5 [P.O.:360; I.V.:2492.5]  Out: 1335 [Urine:1335]          Intake/Output Summary (Last 24 hours) at 04/30/18 5057  Last data filed at 04/30/18 0900   Gross per 24 hour   Intake           1862.5 ml   Output              775 ml   Net           1087.5 ml       Physical Exam:                                        Exam Findings Other   General: No resp distress noted, appears stated age    [de-identified]:  No ulcers, JVD not elevated, no cervical LAD    Chest: No pectus deformity, normal chest rise b/l    HEART:  RRR, no murmurs/rubs/gallops    Lungs:  Clear     ABD: Soft/NT, non rigid mildly distended    EXT: No cyanosis/clubbing/edema, normal peripheral pulses    Skin: No rashes or ulcers, no mottling    Neuro: Alert and answers simple questions         Medications:  Current Facility-Administered Medications   Medication Dose Route Frequency    aspirin chewable tablet 81 mg  81 mg Oral DAILY    carvedilol (COREG) tablet 3.125 mg  3.125 mg Oral BID WITH MEALS    spironolactone (ALDACTONE) tablet 25 mg  25 mg Oral DAILY    dextrose 5% with KCl 20 mEq/L infusion   IntraVENous CONTINUOUS    acetaminophen (TYLENOL) suppository 650 mg  650 mg Rectal Q4H PRN    hydrALAZINE (APRESOLINE) tablet 25 mg  25 mg Oral TID    levoFLOXacin (LEVAQUIN) 750 mg in D5W IVPB  750 mg IntraVENous Q48H    sodium chloride (NS) flush 5-10 mL  5-10 mL IntraVENous Q8H    sodium chloride (NS) flush 5-10 mL  5-10 mL IntraVENous PRN    albuterol-ipratropium (DUO-NEB) 2.5 MG-0.5 MG/3 ML  3 mL Nebulization Q4H RT    budesonide (PULMICORT) 500 mcg/2 ml nebulizer suspension  500 mcg Nebulization BID RT    acetylcysteine (MUCOMYST) 200 mg/mL (20 %) solution 200 mg  200 mg Nebulization QID RT    hydrALAZINE (APRESOLINE) 20 mg/mL injection 10 mg  10 mg IntraVENous Q6H PRN    fluconazole in 0.9% NaCl 100 mg IVPB  100 mg IntraVENous Q24H    0.9% sodium chloride infusion 250 mL  250 mL IntraVENous PRN    sodium chloride (NS) flush 20 mL  20 mL InterCATHeter PRN    sodium chloride (NS) flush 10 mL  10 mL InterCATHeter Q24H    sodium chloride (NS) flush 10 mL  10 mL InterCATHeter PRN    sodium chloride (NS) flush 10 mL  10 mL InterCATHeter Q8H    alteplase (CATHFLO) 1 mg in sterile water (preservative free) 1 mL injection  1 mg InterCATHeter PRN    bacitracin 500 unit/gram packet 1 Packet  1 Packet Topical PRN    meropenem (MERREM) 500 mg in 0.9% sodium chloride (MBP/ADV) 50 mL  0.5 g IntraVENous Q12H    sodium chloride (NS) flush 10-30 mL  10-30 mL InterCATHeter PRN    sodium chloride (NS) flush 10 mL  10 mL InterCATHeter Q24H    sodium chloride (NS) flush 10 mL  10 mL InterCATHeter PRN    sodium chloride (NS) flush 10-40 mL  10-40 mL InterCATHeter Q8H    sodium chloride (NS) flush 20 mL  20 mL InterCATHeter Q24H    anastrozole (ARIMIDEX) tablet 1 mg  1 mg Oral DAILY    0.9% sodium chloride infusion 250 mL  250 mL IntraVENous PRN    epoetin celio (EPOGEN;PROCRIT) injection 10,000 Units  10,000 Units SubCUTAneous Q MON, WED & FRI    albuterol-ipratropium (DUO-NEB) 2.5 MG-0.5 MG/3 ML  3 mL Nebulization Q6H PRN    glucose chewable tablet 16 g  4 Tab Oral PRN    dextrose (D50W) injection syrg 12.5-25 g  12.5-25 g IntraVENous PRN    glucagon (GLUCAGEN) injection 1 mg  1 mg IntraMUSCular PRN    insulin lispro (HUMALOG) injection   SubCUTAneous AC&HS    prochlorperazine (COMPAZINE) with saline injection 5 mg  5 mg IntraVENous Q8H PRN    ascorbic acid (vitamin C) (VITAMIN C) tablet 500 mg  500 mg Oral DAILY    cholecalciferol (VITAMIN D3) tablet 1,000 Units  1,000 Units Oral DAILY    folic acid (FOLVITE) tablet 1 mg  1 mg Oral DAILY    magnesium oxide (MAG-OX) tablet 400 mg  400 mg Oral DAILY    therapeutic multivitamin (THERAGRAN) tablet 1 Tab  1 Tab Oral DAILY    fish oil-omega-3 fatty acids 340-1,000 mg capsule 1 Cap  1 Cap Oral DAILY    sodium chloride (NS) flush 5-10 mL  5-10 mL IntraVENous Q8H    sodium chloride (NS) flush 5-10 mL  5-10 mL IntraVENous PRN    ondansetron (ZOFRAN) injection 4 mg  4 mg IntraVENous Q4H PRN    levothyroxine (SYNTHROID) tablet 88 mcg  88 mcg Oral ACB       Labs:  ABG Recent Labs      04/27/18   1207   PHI  7.434   PCO2I  56.7*   PO2I  111*   HCO3I  38.0*   SO2I  98*   FIO2I  30        CBC Recent Labs      04/28/18   0416   WBC  17.5*   HGB  11.0*   HCT  34.5*   PLT  320   MCV  92.0   MCH  70.9        Metabolic  Panel Recent Labs      04/29/18   0904  04/28/18   1432  04/28/18   0416   NA  146*  150*  148*   K  3.3*  3.8  2.4*   CL  105  108  104   CO2  34*  36*  33*   GLU  209*  168*  147*   BUN  43*  51*  48*   CREA  1.39*  1.44*  1.29*   CA  8.5  9.5  9.3   MG   --    --   1.9   PHOS  2.6  1.5*   --    ALB  2.4*  2.6*   --         Pertinent Labs                JEAN PAUL Anthony  4/30/2018

## 2018-04-30 NOTE — PROGRESS NOTES
0730: Bedside and Verbal shift change report given to Jessica Rico RN (oncoming nurse) by Dianne Marcial (offgoing nurse). Report included the following information SBAR, Kardex, ED Summary, Procedure Summary, Intake/Output, MAR, Accordion and Recent Results. 0800: Patient alert in bed. Family at bedside. VSS. No pain noted. Patient weak but MUJICA and follows commands. 1015: PT/OT at bedside. 1100: SLP at bedside. 1430: Alford removed. 1930: Bedside shift change report given to Richelle Lopez (oncoming nurse) by [facility] (offgoing nurse). Report included the following information SBAR, Kardex, ED Summary, Procedure Summary, Intake/Output, MAR, Accordion and Recent Results.

## 2018-04-30 NOTE — DIABETES MGMT
DTC Progress Note    Recommendations/ Comments: Chart reviewed due to hyperglycemia. Blood sugars now mostly >200   If appropriate, please consider:   - Adding Lantus 12 units     If po intake increases >50% and BG's are still above goal, then may need to consider adding Glimepiride 1 mg with breakfast     Current hospital DM medication: correction scale Humalog, normal sensitivity.      Chart reviewed on Sandy Ledesma.     Patient is a 82 y.o. female with known Type 2 Diabetes on Januvia 50 mg/d; Amaryl 1 mg/d; Metformin 1000 mg BID at home. A1c:   Lab Results   Component Value Date/Time    Hemoglobin A1c 7.0 (H) 04/18/2018 08:14 AM    Hemoglobin A1c 6.9 (H) 11/30/2011 08:30 AM       Recent Glucose Results: Lab Results   Component Value Date/Time    GLUCPOC 234 (H) 04/30/2018 08:32 AM    GLUCPOC 199 (H) 04/29/2018 10:03 PM    GLUCPOC 223 (H) 04/29/2018 04:19 PM        Lab Results   Component Value Date/Time    Creatinine 1.39 (H) 04/29/2018 09:04 AM     Estimated Creatinine Clearance: 26.1 mL/min (based on Cr of 1.39). Active Orders   Diet    DIET CARDIAC Regular; 2 GM NA (House Low NA); No Conc. Sweets        PO intake: Patient Vitals for the past 72 hrs:   % Diet Eaten   04/30/18 0900 10 %   04/29/18 1300 30 %   04/29/18 0800 50 %       Will continue to follow as needed. Thank you  Zohra Hagan, JONATHAN, CDE

## 2018-04-30 NOTE — PROGRESS NOTES
Hematology-Oncology Progress Note    [facility]  1935  [id number]  4/30/2018    Follow-up for: met.  Breast cancer     [x]        Chart notes since last visit reviewed   [x]        Medications reviewed for allergies and interactions       Case discussed with the following:         []                            []        Nursing Staff                                                                         []        Pathologist                                                                        [x]        FAMILY      Subjective:     Spoke with patient who complains of: pt is awake, alert, breathing better    Objective:     Patient Vitals for the past 24 hrs:   BP Temp Pulse Resp SpO2 Weight   04/30/18 0933 - - - - 100 % -   04/30/18 0900 118/52 - 80 19 100 % -   04/30/18 0800 131/60 98.1 °F (36.7 °C) 79 19 100 % -   04/30/18 0725 156/69 - 90 - - -   04/30/18 0700 146/70 - 88 13 100 % -   04/30/18 0600 111/42 - 77 20 100 % 58.3 kg (128 lb 8.5 oz)   04/30/18 0500 118/49 - 76 15 100 % -   04/30/18 0400 127/61 98.8 °F (37.1 °C) 87 17 100 % -   04/30/18 0300 114/52 - 75 18 100 % -   04/30/18 0200 126/52 - 82 19 99 % -   04/30/18 0100 117/51 - 80 16 97 % -   04/30/18 0000 111/40 97.9 °F (36.6 °C) 80 22 100 % -   04/29/18 2300 (!) 107/36 - 77 19 100 % -   04/29/18 2200 105/47 - 82 22 100 % -   04/29/18 2100 101/43 - 80 23 100 % -   04/29/18 2000 99/44 98.5 °F (36.9 °C) 80 10 100 % -   04/29/18 1900 111/49 - 77 15 100 % -   04/29/18 1817 - - - - 100 % -   04/29/18 1800 116/51 - 83 17 100 % -   04/29/18 1700 123/59 - 87 18 100 % -   04/29/18 1600 132/57 99.1 °F (37.3 °C) 92 26 100 % -   04/29/18 1500 122/45 - 96 26 100 % -   04/29/18 1400 138/54 - 94 25 100 % -   04/29/18 1300 143/62 - 95 26 100 % -   04/29/18 1258 - - - - 100 % -   04/29/18 1200 144/61 98.1 °F (36.7 °C) 96 26 99 % -   04/29/18 1100 150/68 - (!) 103 24 100 % -   04/29/18 1000 134/53 - 94 20 99 % -       REVIEW OF SYSTEMS:    Constitutional: negative fever, negative chills, negative weight loss  Eyes:   negative visual changes  ENT:   negative sore throat, tongue or lip swelling  Respiratory:  negative cough, negative dyspnea  Cards:  negative for chest pain, palpitations, lower extremity edema  GI:   negative for nausea, vomiting, diarrhea, and abdominal pain  Neuro:  negative for headaches, dizziness, vertigo  []                        Full ROS o/w normal/non contributor    Constitutional:  Patient looks  [x]        Sick  []        Frail  []        Better                                                 []        Depressed    HEENT:  [x]   NC                         []   AT               []    ALOPECIA           Eyes: [x]   Normal               []    Icteric  Oropharynx: []    Normal                  []  Thrush               []   Dry  Mucositis: []    None                 Grade: []        I  []        II  []        III  []        IV  Neck:   [x]   Supple                  []  Rigid      Breast.. Left upper outer quadrant mass            Cor tachy  Abd.  Soft non tender  Ext, no edema  Skin:  Intact [x]           Purpura []        Rash: [x]   ABSENT       []  PRESENT  Neuro:  []        Normal  [x]        Confused lethargic    Available labs reviewed:  Labs:    Recent Results (from the past 24 hour(s))   GLUCOSE, POC    Collection Time: 04/29/18 11:11 AM   Result Value Ref Range    Glucose (POC) 224 (H) 65 - 100 mg/dL    Performed by [address], POC    Collection Time: 04/29/18  4:19 PM   Result Value Ref Range    Glucose (POC) 223 (H) 65 - 100 mg/dL    Performed by [address], POC    Collection Time: 04/29/18 10:03 PM   Result Value Ref Range    Glucose (POC) 199 (H) 65 - 100 mg/dL    Performed by Rosalie Mckeon    GLUCOSE, POC    Collection Time: 04/30/18  8:32 AM   Result Value Ref Range    Glucose (POC) 234 (H) 65 - 100 mg/dL    Performed by Lee Richter        Available Xrays reviewed:    Chemotherapy monitored and toxicities assessed:    Assessment and Plan   1. Probable metastatic breast cancer. The bone scan shows diffuse mets, the ca 2729 and cea are elevated and the pt has a palpable left breast mass,,,she had a biopsy done in Ultrasound last week,   The biopsy did not have sufficient material for a diagnosis,,, D/w Dr. Camila De Leon on 4/27, she asked path to send whatever material they had for ER/MT status, will continue emperic Arimidex (hormonal AI therapy)    2. Anemia. .. Pt has bone mets, suspect this is due to chronic disease +- marrow involvement,  started procrit weekly on 4/23,,, given one unit prbc 4/23 as well, count up to 10.3 after blood given yesterday (4/26)       3. CHF. Halle Mclain Per cardiology. .     4. AMS. . ,,,She has no evidence of brain mets on MRI ,but does have calvarial mets          Al Rausch MD

## 2018-04-30 NOTE — PROGRESS NOTES
Hospitalist Progress Note  Delaney Celeste MD  Answering service: [phone] from in house phone  Cell: [phone]      Date of Service:  2018  NAME:  Jamal Lange  :  1935  MRN:  [id number]      Admission Summary:   The patient is an 79-year-old female with history of diabetes and rheumatoid arthritis who presented to the emergency room via EMS with complaints of shortness of breath    Interval history / Subjective:   She is sleeping, her son at bedside, he said she is better today, he said she is conscious and more alert, conversant     Assessment & Plan:     Acute on chronic hypoxic/hypercarbic respiratory failure due to pulmonary edema, pneumonia  -off BiPAP , now on high flow, duo neb, mucomyst, IV antibiotics,  monitor pulse ox  -repeated chest x ray  there is pulmonary vascular prominence, bilateral interstitial edema, bibasilar airspace disease and posterior layering bilateral pleural effusions, unchanged.   -s/p bronchoscopy on  and on  suctioned and cleared  -respiratory culture grow on  and  grow candida albicans, on diflucan  -bumex is stopped on   -intensivist on board    Acute encephalopathy possible due to metabolic  -RRT was called for restless and agitation early this morning, patient become more confused, disoriented and lethargic, wakeful to voice, non verbal but follows simple command  -CT head on  volume loss and minimal white matter disease. No acute intracranial [place], Sinus mucosal inflammatory change  -MRI of brain  Study limited by motion artifact, volume loss and white matter disease. No definite intracranial metastasis however no intravenous contrast was administered due to poor renal function.  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  -improved, patient much more alert and answer questions, moves all extremities on   -neurologist onboard    Sepsis secondary to E Coli UTI, pneumonia  POA  -on zosyn, vancomycin, meropenem, and fluconazole   -urine cx  4/19 grow e coli, pseudomanas sp  -blood cx on 4/18, 4/19 and 4/22 no growth    NSTEMI  -on fish oil  -restarted on aspirin on 4/30,  plavix held due to low Hgb  -IV metoprolol is added on 4/28, switched to po coreg  -denied chest pain  -cardiologist on board    Acute systolic CHF NYHA Class IV  -bumex is stopped, on hydralazine, spironolactone started 4/28, coreg on 4/30  -not on ACEi/ ARB due to renal insufficiency   -monitor I/O and daily weight    JEROD on CKD stage III  -creatinine improving  -bumex is stopped on 4/28  -monitor renal function, urine output  -nephrologist on board    Possible metastatic left breast cancer  -on armidex   -Hem/Onc on board    HTN  -BP improving on hydralazine, coreg and spironolactone,  monitor BP    DM-II  -continue insulin sliding scale, monitor finger stick glucose    Hypothyroidism  -continue synthroid    Hx of GERD  -continue pepcid    Elevated liver enzyme   -possible related to liver lesion, monitor LFT    Anemia multifactorial  -Hgb 6.7, s/p 2 units pRBC on 4/26,   -Hgb 9.3, monitor H/H    Hx of colon cancer 25 years ago  -according to son, there is a work-up planned with [facility] regarding the possibility of recurrent CA (based on lesions seen in the calvarium on MRI - 3/19).     -hem/onc on board    Code status: DNR    Code status: DNR  DVT prophylaxis:SCD    Care Plan discussed with: Patient/Family and Nurse  Disposition: Transfer to ICU      Case discussed with her son, Rubi Favorite , on 4/30 questions answered     Hospital Problems  Date Reviewed: 4/16/2018          Codes Class Noted POA    Acute systolic heart failure ([facility] [address] ICD-10-CM: I50.21  ICD-9-CM: 428.21  4/24/2018 Unknown        Altered mental status, unspecified ICD-10-CM: R41.82  ICD-9-CM: 780.97  4/23/2018 Unknown        * (Principal)SOB (shortness of breath) ICD-10-CM: R06.02  ICD-9-CM: 786.05  4/16/2018 Unknown Vital Signs:    Last 24hrs VS reviewed since prior progress note. Most recent are:  Visit Vitals    /64    Pulse 84    Temp 98.1 °F (36.7 °C)    Resp 22    Ht 5' 1\" (1.549 m)    Wt 58.3 kg (128 lb 8.5 oz)    SpO2 98%    BMI 24.29 kg/m2         Intake/Output Summary (Last 24 hours) at 04/30/18 1222  Last data filed at 04/30/18 1200   Gross per 24 hour   Intake           1882.5 ml   Output              760 ml   Net           1122.5 ml        Physical Examination:             Constitutional:  Conscious and alert, on high flow oxygen, not in distress, cooperative, pleasant    ENT:  Oral mucous moist, oropharynx benign. Neck supple,    Resp:  Decrease bronchial breath sound, few wheezing and rhonic bilaterally. No accessory muscle use   CV:  Regular rhythm, normal rate, no murmurs, gallops, rubs    GI:  Soft, non distended, non tender. normoactive bowel sounds, no hepatosplenomegaly     Musculoskeletal:  pedal edema    Neurologic:  Conscious and alert, oriented to place and person, answer questions, moves all extremities     Skin:  Good turgor, no rashes or ulcers       Data Review:    Review and/or order of clinical lab test      Labs:     Recent Labs      04/30/18   1046  04/28/18   0416   WBC  19.3*  17.5*   HGB  9.3*  11.0*   HCT  30.4*  34.5*   PLT  194  320     Recent Labs      04/30/18   1046  04/29/18   0904  04/28/18   1432  04/28/18   0416   NA  139  146*  150*  148*   K  4.6  3.3*  3.8  2.4*   CL  102  105  108  104   CO2  31  34*  36*  33*   BUN  39*  43*  51*  48*   CREA  1.21*  1.39*  1.44*  1.29*   GLU  237*  209*  168*  147*   CA  8.7  8.5  9.5  9.3   MG  1.8   --    --   1.9   PHOS  2.6  2.6  1.5*   --      Recent Labs      04/30/18   1046  04/29/18   0904  04/28/18   1432   ALB  2.3*  2.4*  2.6*     No results for input(s): INR, PTP, APTT in the last 72 hours. No lab exists for component: INREXT, INREXT   No results for input(s): FE, TIBC, PSAT, FERR in the last 72 hours.    No results found for: FOL, RBCF   No results for input(s): PH, PCO2, PO2 in the last 72 hours. No results for input(s): CPK, CKNDX, TROIQ in the last 72 hours.     No lab exists for component: CPKMB  Lab Results   Component Value Date/Time    Cholesterol, total 74 04/16/2018 04:21 AM    HDL Cholesterol 25 04/16/2018 04:21 AM    LDL, calculated 31.6 04/16/2018 04:21 AM    Triglyceride 87 04/16/2018 04:21 AM    CHOL/HDL Ratio 3.0 04/16/2018 04:21 AM     Lab Results   Component Value Date/Time    Glucose (POC) 234 (H) 04/30/2018 08:32 AM    Glucose (POC) 199 (H) 04/29/2018 10:03 PM    Glucose (POC) 223 (H) 04/29/2018 04:19 PM    Glucose (POC) 224 (H) 04/29/2018 11:11 AM    Glucose (POC) 223 (H) 04/29/2018 06:29 AM     Lab Results   Component Value Date/Time    Color YELLOW/STRAW 04/20/2018 04:30 AM    Appearance TURBID (A) 04/20/2018 04:30 AM    Specific gravity 1.029 04/20/2018 04:30 AM    pH (UA) 5.0 04/20/2018 04:30 AM    Protein 100 (A) 04/20/2018 04:30 AM    Glucose NEGATIVE  04/20/2018 04:30 AM    Ketone NEGATIVE  04/20/2018 04:30 AM    Bilirubin NEGATIVE  04/20/2018 04:30 AM    Urobilinogen 0.2 04/20/2018 04:30 AM    Nitrites NEGATIVE  04/20/2018 04:30 AM    Leukocyte Esterase LARGE (A) 04/20/2018 04:30 AM    Epithelial cells FEW 04/20/2018 04:30 AM    Bacteria 3+ (A) 04/20/2018 04:30 AM    WBC >100 (H) 04/20/2018 04:30 AM    RBC 5-10 04/20/2018 04:30 AM         Medications Reviewed:     Current Facility-Administered Medications   Medication Dose Route Frequency    aspirin chewable tablet 81 mg  81 mg Oral DAILY    carvedilol (COREG) tablet 3.125 mg  3.125 mg Oral BID WITH MEALS    spironolactone (ALDACTONE) tablet 25 mg  25 mg Oral DAILY    dextrose 5% with KCl 20 mEq/L infusion   IntraVENous CONTINUOUS    acetaminophen (TYLENOL) suppository 650 mg  650 mg Rectal Q4H PRN    hydrALAZINE (APRESOLINE) tablet 25 mg  25 mg Oral TID    levoFLOXacin (LEVAQUIN) 750 mg in D5W IVPB  750 mg IntraVENous Q48H    sodium chloride (NS) flush 5-10 mL  5-10 mL IntraVENous Q8H    sodium chloride (NS) flush 5-10 mL  5-10 mL IntraVENous PRN    albuterol-ipratropium (DUO-NEB) 2.5 MG-0.5 MG/3 ML  3 mL Nebulization Q4H RT    budesonide (PULMICORT) 500 mcg/2 ml nebulizer suspension  500 mcg Nebulization BID RT    acetylcysteine (MUCOMYST) 200 mg/mL (20 %) solution 200 mg  200 mg Nebulization QID RT    hydrALAZINE (APRESOLINE) 20 mg/mL injection 10 mg  10 mg IntraVENous Q6H PRN    fluconazole in 0.9% NaCl 100 mg IVPB  100 mg IntraVENous Q24H    0.9% sodium chloride infusion 250 mL  250 mL IntraVENous PRN    sodium chloride (NS) flush 20 mL  20 mL InterCATHeter PRN    sodium chloride (NS) flush 10 mL  10 mL InterCATHeter Q24H    sodium chloride (NS) flush 10 mL  10 mL InterCATHeter PRN    sodium chloride (NS) flush 10 mL  10 mL InterCATHeter Q8H    alteplase (CATHFLO) 1 mg in sterile water (preservative free) 1 mL injection  1 mg InterCATHeter PRN    bacitracin 500 unit/gram packet 1 Packet  1 Packet Topical PRN    meropenem (MERREM) 500 mg in 0.9% sodium chloride (MBP/ADV) 50 mL  0.5 g IntraVENous Q12H    sodium chloride (NS) flush 10-30 mL  10-30 mL InterCATHeter PRN    sodium chloride (NS) flush 10 mL  10 mL InterCATHeter Q24H    sodium chloride (NS) flush 10 mL  10 mL InterCATHeter PRN    sodium chloride (NS) flush 10-40 mL  10-40 mL InterCATHeter Q8H    sodium chloride (NS) flush 20 mL  20 mL InterCATHeter Q24H    anastrozole (ARIMIDEX) tablet 1 mg  1 mg Oral DAILY    0.9% sodium chloride infusion 250 mL  250 mL IntraVENous PRN    epoetin celio (EPOGEN;PROCRIT) injection 10,000 Units  10,000 Units SubCUTAneous Q MON, WED & FRI    albuterol-ipratropium (DUO-NEB) 2.5 MG-0.5 MG/3 ML  3 mL Nebulization Q6H PRN    glucose chewable tablet 16 g  4 Tab Oral PRN    dextrose (D50W) injection syrg 12.5-25 g  12.5-25 g IntraVENous PRN    glucagon (GLUCAGEN) injection 1 mg  1 mg IntraMUSCular PRN    insulin lispro (HUMALOG) injection   SubCUTAneous AC&HS    prochlorperazine (COMPAZINE) with saline injection 5 mg  5 mg IntraVENous Q8H PRN    ascorbic acid (vitamin C) (VITAMIN C) tablet 500 mg  500 mg Oral DAILY    cholecalciferol (VITAMIN D3) tablet 1,000 Units  1,000 Units Oral DAILY    folic acid (FOLVITE) tablet 1 mg  1 mg Oral DAILY    magnesium oxide (MAG-OX) tablet 400 mg  400 mg Oral DAILY    therapeutic multivitamin (THERAGRAN) tablet 1 Tab  1 Tab Oral DAILY    fish oil-omega-3 fatty acids 340-1,000 mg capsule 1 Cap  1 Cap Oral DAILY    sodium chloride (NS) flush 5-10 mL  5-10 mL IntraVENous Q8H    sodium chloride (NS) flush 5-10 mL  5-10 mL IntraVENous PRN    ondansetron (ZOFRAN) injection 4 mg  4 mg IntraVENous Q4H PRN    levothyroxine (SYNTHROID) tablet 88 mcg  88 mcg Oral ACB     ______________________________________________________________________  EXPECTED LENGTH OF STAY: 4d 12h  ACTUAL LENGTH OF STAY:          14                 Jamal Brennan MD

## 2018-04-30 NOTE — PROGRESS NOTES
[facility]   [address] [facility] [address], [facility]  Phone: [phone]   [phone]       Nephrology Progress Note  Kenrick Yancey     1935     [id number]  Date of Admission : 4/16/2018 04/30/18    CC: Follow up for JEROD        Assessment and Plan   JEROD on CKD:  - 2/2 ATN + labile HTN and ongoing diuretics  - resolving   - continue to hold Bumex  - daily labs for now     Hypokalemia :   - cont continuous KCl  - give 40meq po x 1 this AM  - cont aldactone    Left Lower Pole Renal Calculus :  - likely source of UTI and Pyelo like picture   - sepsis better now and urine had Pan sensitive E coli and Pseudomonas     Labile HTN:  - cont current BP meds for now    Acute Resp Failure:  - bronch 4/25 and 4/26  - per PCCM     Sepsis  -off pressors     CKD Stage III :  - baseline Cr 0.9-1 mg/dl lately   - progressing, Cr around 1.5 to 1.7 here     NSTEMI :  - Echo shows EF 35-45%, mod LVH, Severe Hypokinesis of apex and moderate Hypokinesis of anteroseptal wall     Acute Systolic CHF: 2/2 MI   - avoid  ACE/ARB for now     Chronic [place] Infarcts      Metastatic Breast Ca w/ Calvarial and Bone mets   - s/p breast Bx      - hx of colon Ca     Anemia :  - per heme/onc     Interval History:  Seen and examined. Stable UOP. On high flow. More alert this AM.  Cr stable. Review of Systems: Review of systems not obtained due to patient factors.     Current Medications:   Current Facility-Administered Medications   Medication Dose Route Frequency    aspirin chewable tablet 81 mg  81 mg Oral DAILY    carvedilol (COREG) tablet 3.125 mg  3.125 mg Oral BID WITH MEALS    spironolactone (ALDACTONE) tablet 25 mg  25 mg Oral DAILY    dextrose 5% with KCl 20 mEq/L infusion   IntraVENous CONTINUOUS    acetaminophen (TYLENOL) suppository 650 mg  650 mg Rectal Q4H PRN    hydrALAZINE (APRESOLINE) tablet 25 mg  25 mg Oral TID    levoFLOXacin (LEVAQUIN) 750 mg in D5W IVPB  750 mg IntraVENous Q48H    sodium chloride (NS) flush 5-10 mL  5-10 mL IntraVENous Q8H    sodium chloride (NS) flush 5-10 mL  5-10 mL IntraVENous PRN    albuterol-ipratropium (DUO-NEB) 2.5 MG-0.5 MG/3 ML  3 mL Nebulization Q4H RT    budesonide (PULMICORT) 500 mcg/2 ml nebulizer suspension  500 mcg Nebulization BID RT    acetylcysteine (MUCOMYST) 200 mg/mL (20 %) solution 200 mg  200 mg Nebulization QID RT    hydrALAZINE (APRESOLINE) 20 mg/mL injection 10 mg  10 mg IntraVENous Q6H PRN    fluconazole in 0.9% NaCl 100 mg IVPB  100 mg IntraVENous Q24H    0.9% sodium chloride infusion 250 mL  250 mL IntraVENous PRN    sodium chloride (NS) flush 20 mL  20 mL InterCATHeter PRN    sodium chloride (NS) flush 10 mL  10 mL InterCATHeter Q24H    sodium chloride (NS) flush 10 mL  10 mL InterCATHeter PRN    sodium chloride (NS) flush 10 mL  10 mL InterCATHeter Q8H    alteplase (CATHFLO) 1 mg in sterile water (preservative free) 1 mL injection  1 mg InterCATHeter PRN    bacitracin 500 unit/gram packet 1 Packet  1 Packet Topical PRN    meropenem (MERREM) 500 mg in 0.9% sodium chloride (MBP/ADV) 50 mL  0.5 g IntraVENous Q12H    sodium chloride (NS) flush 10-30 mL  10-30 mL InterCATHeter PRN    sodium chloride (NS) flush 10 mL  10 mL InterCATHeter Q24H    sodium chloride (NS) flush 10 mL  10 mL InterCATHeter PRN    sodium chloride (NS) flush 10-40 mL  10-40 mL InterCATHeter Q8H    sodium chloride (NS) flush 20 mL  20 mL InterCATHeter Q24H    anastrozole (ARIMIDEX) tablet 1 mg  1 mg Oral DAILY    0.9% sodium chloride infusion 250 mL  250 mL IntraVENous PRN    epoetin celio (EPOGEN;PROCRIT) injection 10,000 Units  10,000 Units SubCUTAneous Q MON, WED & FRI    albuterol-ipratropium (DUO-NEB) 2.5 MG-0.5 MG/3 ML  3 mL Nebulization Q6H PRN    glucose chewable tablet 16 g  4 Tab Oral PRN    dextrose (D50W) injection syrg 12.5-25 g  12.5-25 g IntraVENous PRN    glucagon (GLUCAGEN) injection 1 mg  1 mg IntraMUSCular PRN    insulin lispro (HUMALOG) injection   SubCUTAneous AC&HS    prochlorperazine (COMPAZINE) with saline injection 5 mg  5 mg IntraVENous Q8H PRN    ascorbic acid (vitamin C) (VITAMIN C) tablet 500 mg  500 mg Oral DAILY    cholecalciferol (VITAMIN D3) tablet 1,000 Units  1,000 Units Oral DAILY    folic acid (FOLVITE) tablet 1 mg  1 mg Oral DAILY    magnesium oxide (MAG-OX) tablet 400 mg  400 mg Oral DAILY    therapeutic multivitamin (THERAGRAN) tablet 1 Tab  1 Tab Oral DAILY    fish oil-omega-3 fatty acids 340-1,000 mg capsule 1 Cap  1 Cap Oral DAILY    sodium chloride (NS) flush 5-10 mL  5-10 mL IntraVENous Q8H    sodium chloride (NS) flush 5-10 mL  5-10 mL IntraVENous PRN    ondansetron (ZOFRAN) injection 4 mg  4 mg IntraVENous Q4H PRN    levothyroxine (SYNTHROID) tablet 88 mcg  88 mcg Oral ACB      Allergies   Allergen Reactions    Statins-Hmg-Coa Reductase Inhibitors Other (comments)     Intolerant to statins     Sulfa (Sulfonamide Antibiotics) Other (comments)     syncope       Objective:  Vitals:    Vitals:    04/30/18 0700 04/30/18 0725 04/30/18 0800 04/30/18 0933   BP: 146/70 156/69 131/60    Pulse: 88 90 79    Resp: 13  19    Temp:   98.1 °F (36.7 °C)    SpO2: 100%  100% 100%   Weight:       Height:         Intake and Output:  04/30 0701 - 04/30 1900  In: 270 [P.O.:120; I.V.:150]  Out: 75 [Urine:75]  04/28 1901 - 04/30 0700  In: 2852.5 [P.O.:360; I.V.:2492.5]  Out: 7731 [Urine:1335]    Physical Examination:    General: Alert, frail  Neck:  Supple, no mass  Resp:  Lungs mostly clear  CV:  RRR, no murmur  GI:  Soft, NT, + BS  Ext;                  No LE edema  Neurologic:  Non focal   :                  Alford in place    []    High complexity decision making was performed  []    Patient is at high-risk of decompensation with multiple organ involvement    Lab Data Personally Reviewed: I have reviewed all the pertinent labs, microbiology data and radiology studies during assessment.     Recent Labs      04/29/18 3319  04/28/18   1432  04/28/18   0416   NA  146*  150*  148*   K  3.3*  3.8  2.4*   CL  105  108  104   CO2  34*  36*  33*   GLU  209*  168*  147*   BUN  43*  51*  48*   CREA  1.39*  1.44*  1.29*   CA  8.5  9.5  9.3   MG   --    --   1.9   PHOS  2.6  1.5*   --    ALB  2.4*  2.6*   --      Recent Labs      04/28/18   0416   WBC  17.5*   HGB  11.0*   HCT  34.5*   PLT  320     No results found for: SDES  Lab Results   Component Value Date/Time    Culture result: LIGHT APPARENT CANDIDA ALBICANS (A) 04/26/2018 09:10 AM    Culture result: NO NORMAL RESPIRATORY BERYL ISOLATED 04/26/2018 09:10 AM    Culture result: MODERATE APPARENT CANDIDA ALBICANS (A) 04/25/2018 09:40 AM    Culture result: RARE NORMAL RESPIRATORY BERYL 04/25/2018 09:40 AM    Culture result: NO GROWTH 1 DAY 04/22/2018 07:35 AM    Culture result: NO GROWTH 5 DAYS 04/22/2018 07:35 AM     Recent Results (from the past 24 hour(s))   GLUCOSE, POC    Collection Time: 04/29/18 11:11 AM   Result Value Ref Range    Glucose (POC) 224 (H) 65 - 100 mg/dL    Performed by [address], POC    Collection Time: 04/29/18  4:19 PM   Result Value Ref Range    Glucose (POC) 223 (H) 65 - 100 mg/dL    Performed by [address], POC    Collection Time: 04/29/18 10:03 PM   Result Value Ref Range    Glucose (POC) 199 (H) 65 - 100 mg/dL    Performed by [address], POC    Collection Time: 04/30/18  8:32 AM   Result Value Ref Range    Glucose (POC) 234 (H) 65 - 100 mg/dL    Performed by Celi Cortes        I have reviewed the flowsheets. Chart and Pertinent Notes have been reviewed. No change in PMH ,family and social history from Consult note.       Felipa Yip MD

## 2018-04-30 NOTE — ROUTINE PROCESS
Bedside and Verbal shift change report given to Pati Patel (oncoming nurse) by Vita Torres (offgoing nurse). Report included the following information SBAR, Kardex, Intake/Output, MAR, Recent Results, Cardiac Rhythm NSR and Alarm Parameters .

## 2018-05-01 ENCOUNTER — APPOINTMENT (OUTPATIENT)
Dept: GENERAL RADIOLOGY | Age: 83
DRG: 853 | End: 2018-05-01
Attending: PHYSICIAN ASSISTANT
Payer: MEDICARE

## 2018-05-01 LAB
ALBUMIN SERPL-MCNC: 2.2 G/DL (ref 3.5–5)
ALBUMIN/GLOB SERPL: 0.5 {RATIO} (ref 1.1–2.2)
ALP SERPL-CCNC: 484 U/L (ref 45–117)
ALT SERPL-CCNC: 26 U/L (ref 12–78)
ANION GAP SERPL CALC-SCNC: 7 MMOL/L (ref 5–15)
AST SERPL-CCNC: 75 U/L (ref 15–37)
BASOPHILS # BLD: 0 K/UL (ref 0–0.1)
BASOPHILS NFR BLD: 0 % (ref 0–1)
BILIRUB DIRECT SERPL-MCNC: 0.2 MG/DL (ref 0–0.2)
BILIRUB SERPL-MCNC: 0.6 MG/DL (ref 0.2–1)
BUN SERPL-MCNC: 34 MG/DL (ref 6–20)
BUN/CREAT SERPL: 31 (ref 12–20)
CALCIUM SERPL-MCNC: 9.1 MG/DL (ref 8.5–10.1)
CHLORIDE SERPL-SCNC: 100 MMOL/L (ref 97–108)
CO2 SERPL-SCNC: 31 MMOL/L (ref 21–32)
CREAT SERPL-MCNC: 1.08 MG/DL (ref 0.55–1.02)
DIFFERENTIAL METHOD BLD: ABNORMAL
EOSINOPHIL # BLD: 0.2 K/UL (ref 0–0.4)
EOSINOPHIL NFR BLD: 1 % (ref 0–7)
ERYTHROCYTE [DISTWIDTH] IN BLOOD BY AUTOMATED COUNT: 23.1 % (ref 11.5–14.5)
GLOBULIN SER CALC-MCNC: 4.5 G/DL (ref 2–4)
GLUCOSE BLD STRIP.AUTO-MCNC: 119 MG/DL (ref 65–100)
GLUCOSE BLD STRIP.AUTO-MCNC: 168 MG/DL (ref 65–100)
GLUCOSE BLD STRIP.AUTO-MCNC: 215 MG/DL (ref 65–100)
GLUCOSE BLD STRIP.AUTO-MCNC: 216 MG/DL (ref 65–100)
GLUCOSE SERPL-MCNC: 225 MG/DL (ref 65–100)
HCT VFR BLD AUTO: 32 % (ref 35–47)
HGB BLD-MCNC: 10.1 G/DL (ref 11.5–16)
IMM GRANULOCYTES # BLD: 0 K/UL
IMM GRANULOCYTES NFR BLD AUTO: 0 %
LYMPHOCYTES # BLD: 0.6 K/UL (ref 0.8–3.5)
LYMPHOCYTES NFR BLD: 3 % (ref 12–49)
MCH RBC QN AUTO: 29.2 PG (ref 26–34)
MCHC RBC AUTO-ENTMCNC: 31.6 G/DL (ref 30–36.5)
MCV RBC AUTO: 92.5 FL (ref 80–99)
METAMYELOCYTES NFR BLD MANUAL: 1 %
MONOCYTES # BLD: 0.8 K/UL (ref 0–1)
MONOCYTES NFR BLD: 4 % (ref 5–13)
MYELOCYTES NFR BLD MANUAL: 2 %
NEUTS SEG # BLD: 18.8 K/UL (ref 1.8–8)
NEUTS SEG NFR BLD: 89 % (ref 32–75)
NRBC # BLD: 0.15 K/UL (ref 0–0.01)
NRBC BLD-RTO: 0.7 PER 100 WBC
PLATELET # BLD AUTO: 187 K/UL (ref 150–400)
PMV BLD AUTO: 10.5 FL (ref 8.9–12.9)
POTASSIUM SERPL-SCNC: 4.2 MMOL/L (ref 3.5–5.1)
PROT SERPL-MCNC: 6.7 G/DL (ref 6.4–8.2)
RBC # BLD AUTO: 3.46 M/UL (ref 3.8–5.2)
RBC MORPH BLD: ABNORMAL
SERVICE CMNT-IMP: ABNORMAL
SODIUM SERPL-SCNC: 138 MMOL/L (ref 136–145)
WBC # BLD AUTO: 21.1 K/UL (ref 3.6–11)

## 2018-05-01 PROCEDURE — 87102 FUNGUS ISOLATION CULTURE: CPT | Performed by: INTERNAL MEDICINE

## 2018-05-01 PROCEDURE — 77030029684 HC NEB SM VOL KT MONA -A

## 2018-05-01 PROCEDURE — 80048 BASIC METABOLIC PNL TOTAL CA: CPT | Performed by: PHYSICIAN ASSISTANT

## 2018-05-01 PROCEDURE — 74011250637 HC RX REV CODE- 250/637: Performed by: NURSE PRACTITIONER

## 2018-05-01 PROCEDURE — 36415 COLL VENOUS BLD VENIPUNCTURE: CPT | Performed by: PHYSICIAN ASSISTANT

## 2018-05-01 PROCEDURE — 74011000250 HC RX REV CODE- 250: Performed by: INTERNAL MEDICINE

## 2018-05-01 PROCEDURE — 74011250636 HC RX REV CODE- 250/636: Performed by: INTERNAL MEDICINE

## 2018-05-01 PROCEDURE — 74230 X-RAY XM SWLNG FUNCJ C+: CPT

## 2018-05-01 PROCEDURE — G8996 SWALLOW CURRENT STATUS: HCPCS | Performed by: SPEECH-LANGUAGE PATHOLOGIST

## 2018-05-01 PROCEDURE — 74011000258 HC RX REV CODE- 258: Performed by: HOSPITALIST

## 2018-05-01 PROCEDURE — 80076 HEPATIC FUNCTION PANEL: CPT | Performed by: HOSPITALIST

## 2018-05-01 PROCEDURE — G8998 SWALLOW D/C STATUS: HCPCS | Performed by: SPEECH-LANGUAGE PATHOLOGIST

## 2018-05-01 PROCEDURE — 77030013140 HC MSK NEB VYRM -A

## 2018-05-01 PROCEDURE — 92611 MOTION FLUOROSCOPY/SWALLOW: CPT | Performed by: SPEECH-LANGUAGE PATHOLOGIST

## 2018-05-01 PROCEDURE — 74011636637 HC RX REV CODE- 636/637: Performed by: HOSPITALIST

## 2018-05-01 PROCEDURE — 74011250636 HC RX REV CODE- 250/636: Performed by: HOSPITALIST

## 2018-05-01 PROCEDURE — 94660 CPAP INITIATION&MGMT: CPT

## 2018-05-01 PROCEDURE — 85025 COMPLETE CBC W/AUTO DIFF WBC: CPT | Performed by: PHYSICIAN ASSISTANT

## 2018-05-01 PROCEDURE — G8997 SWALLOW GOAL STATUS: HCPCS | Performed by: SPEECH-LANGUAGE PATHOLOGIST

## 2018-05-01 PROCEDURE — 74011250637 HC RX REV CODE- 250/637: Performed by: HOSPITALIST

## 2018-05-01 PROCEDURE — 87205 SMEAR GRAM STAIN: CPT | Performed by: INTERNAL MEDICINE

## 2018-05-01 PROCEDURE — 74011000258 HC RX REV CODE- 258: Performed by: INTERNAL MEDICINE

## 2018-05-01 PROCEDURE — 74011250637 HC RX REV CODE- 250/637: Performed by: INTERNAL MEDICINE

## 2018-05-01 PROCEDURE — 88112 CYTOPATH CELL ENHANCE TECH: CPT | Performed by: INTERNAL MEDICINE

## 2018-05-01 PROCEDURE — 82962 GLUCOSE BLOOD TEST: CPT

## 2018-05-01 PROCEDURE — 94640 AIRWAY INHALATION TREATMENT: CPT

## 2018-05-01 PROCEDURE — 65610000006 HC RM INTENSIVE CARE

## 2018-05-01 RX ORDER — NALOXONE HYDROCHLORIDE 0.4 MG/ML
0.4 INJECTION, SOLUTION INTRAMUSCULAR; INTRAVENOUS; SUBCUTANEOUS
Status: DISCONTINUED | OUTPATIENT
Start: 2018-05-01 | End: 2018-05-11 | Stop reason: HOSPADM

## 2018-05-01 RX ORDER — MIDAZOLAM HYDROCHLORIDE 1 MG/ML
.25-5 INJECTION, SOLUTION INTRAMUSCULAR; INTRAVENOUS
Status: DISCONTINUED | OUTPATIENT
Start: 2018-05-01 | End: 2018-05-11 | Stop reason: HOSPADM

## 2018-05-01 RX ORDER — FENTANYL CITRATE 50 UG/ML
50 INJECTION, SOLUTION INTRAMUSCULAR; INTRAVENOUS
Status: ACTIVE | OUTPATIENT
Start: 2018-05-01 | End: 2018-05-01

## 2018-05-01 RX ORDER — FUROSEMIDE 10 MG/ML
40 INJECTION INTRAMUSCULAR; INTRAVENOUS ONCE
Status: COMPLETED | OUTPATIENT
Start: 2018-05-01 | End: 2018-05-01

## 2018-05-01 RX ORDER — LIDOCAINE HYDROCHLORIDE 20 MG/ML
JELLY TOPICAL ONCE
Status: ACTIVE | OUTPATIENT
Start: 2018-05-01 | End: 2018-05-01

## 2018-05-01 RX ORDER — LIDOCAINE HYDROCHLORIDE 20 MG/ML
5 SOLUTION OROPHARYNGEAL ONCE
Status: COMPLETED | OUTPATIENT
Start: 2018-05-01 | End: 2018-05-01

## 2018-05-01 RX ORDER — FLUCONAZOLE 2 MG/ML
200 INJECTION, SOLUTION INTRAVENOUS EVERY 24 HOURS
Status: DISCONTINUED | OUTPATIENT
Start: 2018-05-01 | End: 2018-05-04

## 2018-05-01 RX ORDER — LIDOCAINE HYDROCHLORIDE 20 MG/ML
1-20 INJECTION, SOLUTION EPIDURAL; INFILTRATION; INTRACAUDAL; PERINEURAL ONCE
Status: ACTIVE | OUTPATIENT
Start: 2018-05-01 | End: 2018-05-01

## 2018-05-01 RX ORDER — FLUMAZENIL 0.1 MG/ML
0.2 INJECTION INTRAVENOUS
Status: DISCONTINUED | OUTPATIENT
Start: 2018-05-01 | End: 2018-05-11 | Stop reason: HOSPADM

## 2018-05-01 RX ORDER — CARVEDILOL 6.25 MG/1
6.25 TABLET ORAL 2 TIMES DAILY WITH MEALS
Status: DISCONTINUED | OUTPATIENT
Start: 2018-05-01 | End: 2018-05-11 | Stop reason: HOSPADM

## 2018-05-01 RX ADMIN — Medication 10 ML: at 18:09

## 2018-05-01 RX ADMIN — Medication 10 ML: at 05:57

## 2018-05-01 RX ADMIN — ACETYLCYSTEINE 200 MG: 200 SOLUTION ORAL; RESPIRATORY (INHALATION) at 23:19

## 2018-05-01 RX ADMIN — MEROPENEM 500 MG: 500 INJECTION, POWDER, FOR SOLUTION INTRAVENOUS at 11:48

## 2018-05-01 RX ADMIN — BUDESONIDE 500 MCG: 0.5 INHALANT RESPIRATORY (INHALATION) at 08:04

## 2018-05-01 RX ADMIN — INSULIN LISPRO 3 UNITS: 100 INJECTION, SOLUTION INTRAVENOUS; SUBCUTANEOUS at 08:31

## 2018-05-01 RX ADMIN — Medication 10 ML: at 15:17

## 2018-05-01 RX ADMIN — INSULIN LISPRO 2 UNITS: 100 INJECTION, SOLUTION INTRAVENOUS; SUBCUTANEOUS at 12:05

## 2018-05-01 RX ADMIN — BENZOCAINE: 200 SPRAY DENTAL; ORAL; PERIODONTAL at 10:00

## 2018-05-01 RX ADMIN — IPRATROPIUM BROMIDE AND ALBUTEROL SULFATE 3 ML: .5; 3 SOLUTION RESPIRATORY (INHALATION) at 04:12

## 2018-05-01 RX ADMIN — ACETAMINOPHEN 650 MG: 650 SUPPOSITORY RECTAL at 12:43

## 2018-05-01 RX ADMIN — Medication 10 ML: at 22:00

## 2018-05-01 RX ADMIN — MEROPENEM 500 MG: 500 INJECTION, POWDER, FOR SOLUTION INTRAVENOUS at 18:54

## 2018-05-01 RX ADMIN — FLUCONAZOLE IN SODIUM CHLORIDE 200 MG: 2 INJECTION, SOLUTION INTRAVENOUS at 11:53

## 2018-05-01 RX ADMIN — IPRATROPIUM BROMIDE AND ALBUTEROL SULFATE 3 ML: .5; 3 SOLUTION RESPIRATORY (INHALATION) at 23:19

## 2018-05-01 RX ADMIN — ACETYLCYSTEINE 200 MG: 200 SOLUTION ORAL; RESPIRATORY (INHALATION) at 17:04

## 2018-05-01 RX ADMIN — HYDRALAZINE HYDROCHLORIDE 25 MG: 25 TABLET ORAL at 16:24

## 2018-05-01 RX ADMIN — CARVEDILOL 6.25 MG: 6.25 TABLET, FILM COATED ORAL at 18:09

## 2018-05-01 RX ADMIN — MEROPENEM 500 MG: 500 INJECTION, POWDER, FOR SOLUTION INTRAVENOUS at 00:59

## 2018-05-01 RX ADMIN — ACETYLCYSTEINE 200 MG: 200 SOLUTION ORAL; RESPIRATORY (INHALATION) at 08:04

## 2018-05-01 RX ADMIN — IPRATROPIUM BROMIDE AND ALBUTEROL SULFATE 3 ML: .5; 3 SOLUTION RESPIRATORY (INHALATION) at 00:00

## 2018-05-01 RX ADMIN — Medication 20 ML: at 10:00

## 2018-05-01 RX ADMIN — INSULIN LISPRO 2 UNITS: 100 INJECTION, SOLUTION INTRAVENOUS; SUBCUTANEOUS at 22:07

## 2018-05-01 RX ADMIN — IPRATROPIUM BROMIDE AND ALBUTEROL SULFATE 3 ML: .5; 3 SOLUTION RESPIRATORY (INHALATION) at 17:03

## 2018-05-01 RX ADMIN — BUDESONIDE 500 MCG: 0.5 INHALANT RESPIRATORY (INHALATION) at 23:18

## 2018-05-01 RX ADMIN — FUROSEMIDE 40 MG: 10 INJECTION, SOLUTION INTRAMUSCULAR; INTRAVENOUS at 00:59

## 2018-05-01 RX ADMIN — LIDOCAINE HYDROCHLORIDE 5 ML: 20 SOLUTION ORAL; TOPICAL at 10:00

## 2018-05-01 RX ADMIN — SODIUM CHLORIDE 0.2 MCG/KG/HR: 900 INJECTION, SOLUTION INTRAVENOUS at 10:38

## 2018-05-01 RX ADMIN — IPRATROPIUM BROMIDE AND ALBUTEROL SULFATE 3 ML: .5; 3 SOLUTION RESPIRATORY (INHALATION) at 08:04

## 2018-05-01 NOTE — PROGRESS NOTES
ID Progress Note  2018    Subjective:     She remains weak. She has needed bipap intermittently. She has no specific complaints. Bronchoscopy earlier today--sleeping. Objective:     Antibiotics:  1. Merrem  2. Levaquin  3. Fluconazole      Vitals:   Visit Vitals    /51    Pulse 82    Temp 98.8 °F (37.1 °C)    Resp 21    Ht 5' 1\" (1.549 m)    Wt 59.9 kg (132 lb 0.9 oz)    SpO2 100%    BMI 24.95 kg/m2        Tmax:  Temp (24hrs), Av.8 °F (37.1 °C), Min:97.9 °F (36.6 °C), Max:100.8 °F (38.2 °C)      Exam:  Lungs:  rhonchi R anterior  Heart:  regular rate and rhythm  Abdomen:  soft, non-tender. Bowel sounds normal. No masses,  no organomegaly  Skin:  no rash or abnormalities    Labs:      Recent Labs      18   0407  18   1046  18   0904   WBC  21.1*  19.3*   --    HGB  10.1*  9.3*   --    PLT  187  194   --    BUN  34*  39*  43*   CREA  1.08*  1.21*  1.39*   SGOT  75*   --    --    AP  484*   --    --    TBILI  0.6   --    --        Cultures:     No results found for: [facility], [place]  Lab Results   Component Value Date/Time    Culture result: PENDING 2018 11:30 AM    Culture result: LIGHT APPARENT CANDIDA ALBICANS (A) 2018 09:10 AM    Culture result: NO NORMAL RESPIRATORY BERYL ISOLATED 2018 09:10 AM       Radiology: X ray stable    Line/Insert Date:           Assessment:     1. Pneumonia  2. Metastatic cancer  3. Debility  4. Candida from sputum (uncertain significance)  5. Leukocytosis--WBC up and down    Objective:     1. Continue current therapy  2. Follow up pending cultures and studies  3.  D/W son at bedside    Torey Orozco MD

## 2018-05-01 NOTE — PROGRESS NOTES
[facility]   [address] [facility], [facility] [address], [facility]  Phone: [phone]   [phone]       Nephrology Progress Note  Krissy Age     1935     [id number]  Date of Admission : 4/16/2018 05/01/18    CC: Follow up for JEROD        Assessment and Plan   JEROD on CKD:  - 2/2 ATN + labile HTN and ongoing diuretics  - resolving   - cont current care for now  - daily labs    Hypokalemia :   - resolved  - cont aldactone    E. Coli and Psuedomonas UTI:  - on IV abx    Labile HTN:  - BP stable today  - cont current BP meds for now    Acute Resp Failure:  - bronch 4/25 and 4/26  - repeat planned for today    Sepsis  -off pressors     CKD Stage III :  - baseline Cr 0.9-1 mg/dl lately   - progressing, Cr around 1.5 to 1.7 here     NSTEMI :  - Echo shows EF 35-45%, mod LVH, Severe Hypokinesis of apex and moderate Hypokinesis of anteroseptal wall     Acute Systolic CHF: 2/2 MI   - avoid  ACE/ARB for now     Chronic Pontine Infarcts      Metastatic Breast Ca w/ Calvarial and Bone mets   - s/p breast Bx      - hx of colon Ca     Anemia :  - per heme/onc     Interval History:  Seen and examined. Stable UOP. On high flow. Cr stable. UOP stable. Plans noted for a bronch. Review of Systems: Review of systems not obtained due to patient factors.     Current Medications:   Current Facility-Administered Medications   Medication Dose Route Frequency    naloxone (NARCAN) injection 0.4 mg  0.4 mg IntraVENous Multiple    flumazenil (ROMAZICON) 0.1 mg/mL injection 0.2 mg  0.2 mg IntraVENous Multiple    benzocaine (HURRICANE) 20 % spray   Mucous Membrane ONCE    lidocaine (XYLOCAINE) 2 % viscous solution 5 mL  5 mL Mouth/Throat ONCE    lidocaine (XYLOCAINE) 2 % jelly   Topical ONCE    lidocaine (PF) (XYLOCAINE) 20 mg/mL (2 %) injection  mg  1-20 mL Topical ONCE    midazolam (VERSED) injection 0.25-5 mg  0.25-5 mg IntraVENous Multiple    fentaNYL citrate (PF) injection 50 mcg  50 mcg IntraVENous Multiple    carvedilol (COREG) tablet 6.25 mg  6.25 mg Oral BID WITH MEALS    dexmedeTOMidine (PRECEDEX) 400 mcg in 0.9% sodium chloride 100 mL infusion  0.2-0.7 mcg/kg/hr IntraVENous TITRATE    meropenem (MERREM) 500 mg in 0.9% sodium chloride (MBP/ADV) 50 mL  0.5 g IntraVENous Q8H    fluconazole (DIFLUCAN) 200mg/100 mL IVPB (premix)  200 mg IntraVENous Q24H    aspirin chewable tablet 81 mg  81 mg Oral DAILY    spironolactone (ALDACTONE) tablet 25 mg  25 mg Oral DAILY    acetaminophen (TYLENOL) suppository 650 mg  650 mg Rectal Q4H PRN    hydrALAZINE (APRESOLINE) tablet 25 mg  25 mg Oral TID    levoFLOXacin (LEVAQUIN) 750 mg in D5W IVPB  750 mg IntraVENous Q48H    sodium chloride (NS) flush 5-10 mL  5-10 mL IntraVENous Q8H    sodium chloride (NS) flush 5-10 mL  5-10 mL IntraVENous PRN    albuterol-ipratropium (DUO-NEB) 2.5 MG-0.5 MG/3 ML  3 mL Nebulization Q4H RT    budesonide (PULMICORT) 500 mcg/2 ml nebulizer suspension  500 mcg Nebulization BID RT    acetylcysteine (MUCOMYST) 200 mg/mL (20 %) solution 200 mg  200 mg Nebulization QID RT    hydrALAZINE (APRESOLINE) 20 mg/mL injection 10 mg  10 mg IntraVENous Q6H PRN    0.9% sodium chloride infusion 250 mL  250 mL IntraVENous PRN    sodium chloride (NS) flush 20 mL  20 mL InterCATHeter PRN    sodium chloride (NS) flush 10 mL  10 mL InterCATHeter Q24H    sodium chloride (NS) flush 10 mL  10 mL InterCATHeter PRN    sodium chloride (NS) flush 10 mL  10 mL InterCATHeter Q8H    alteplase (CATHFLO) 1 mg in sterile water (preservative free) 1 mL injection  1 mg InterCATHeter PRN    bacitracin 500 unit/gram packet 1 Packet  1 Packet Topical PRN    sodium chloride (NS) flush 10-30 mL  10-30 mL InterCATHeter PRN    sodium chloride (NS) flush 10 mL  10 mL InterCATHeter Q24H    sodium chloride (NS) flush 10 mL  10 mL InterCATHeter PRN    sodium chloride (NS) flush 10-40 mL  10-40 mL InterCATHeter Q8H    sodium chloride (NS) flush 20 mL 20 mL InterCATHeter Q24H    anastrozole (ARIMIDEX) tablet 1 mg  1 mg Oral DAILY    0.9% sodium chloride infusion 250 mL  250 mL IntraVENous PRN    epoetin celio (EPOGEN;PROCRIT) injection 10,000 Units  10,000 Units SubCUTAneous Q MON, WED & FRI    albuterol-ipratropium (DUO-NEB) 2.5 MG-0.5 MG/3 ML  3 mL Nebulization Q6H PRN    glucose chewable tablet 16 g  4 Tab Oral PRN    dextrose (D50W) injection syrg 12.5-25 g  12.5-25 g IntraVENous PRN    glucagon (GLUCAGEN) injection 1 mg  1 mg IntraMUSCular PRN    insulin lispro (HUMALOG) injection   SubCUTAneous AC&HS    prochlorperazine (COMPAZINE) with saline injection 5 mg  5 mg IntraVENous Q8H PRN    ascorbic acid (vitamin C) (VITAMIN C) tablet 500 mg  500 mg Oral DAILY    cholecalciferol (VITAMIN D3) tablet 1,000 Units  1,000 Units Oral DAILY    folic acid (FOLVITE) tablet 1 mg  1 mg Oral DAILY    magnesium oxide (MAG-OX) tablet 400 mg  400 mg Oral DAILY    therapeutic multivitamin (THERAGRAN) tablet 1 Tab  1 Tab Oral DAILY    fish oil-omega-3 fatty acids 340-1,000 mg capsule 1 Cap  1 Cap Oral DAILY    sodium chloride (NS) flush 5-10 mL  5-10 mL IntraVENous Q8H    sodium chloride (NS) flush 5-10 mL  5-10 mL IntraVENous PRN    ondansetron (ZOFRAN) injection 4 mg  4 mg IntraVENous Q4H PRN    levothyroxine (SYNTHROID) tablet 88 mcg  88 mcg Oral ACB      Allergies   Allergen Reactions    Statins-Hmg-Coa Reductase Inhibitors Other (comments)     Intolerant to statins     Sulfa (Sulfonamide Antibiotics) Other (comments)     syncope       Objective:  Vitals:    Vitals:    05/01/18 0700 05/01/18 0800 05/01/18 0805 05/01/18 0900   BP: 137/56 130/52  147/62   Pulse: 97 89  100   Resp: 25 23  28   Temp:  97.9 °F (36.6 °C)     SpO2: 99% 99% 99% 97%   Weight:       Height:         Intake and Output:  05/01 0701 - 05/01 1900  In: 50 [I.V.:50]  Out: 200 [Urine:200]  04/29 1901 - 05/01 0700  In: 1842.5 [P.O.:150;  I.V.:1692.5]  Out: 2115 [Urine:2115]    Physical Examination:    General: Alert, frail  Neck:  Supple, no mass  Resp:  Lungs mostly clear  CV:  RRR, no murmur  GI:  Soft, NT, + BS  Ext;                  No LE edema  Neurologic:  Non focal   :                  Alford in place    []    High complexity decision making was performed  []    Patient is at high-risk of decompensation with multiple organ involvement    Lab Data Personally Reviewed: I have reviewed all the pertinent labs, microbiology data and radiology studies during assessment. Recent Labs      05/01/18   0407  04/30/18   1046  04/29/18   0904  04/28/18   1432   NA  138  139  146*  150*   K  4.2  4.6  3.3*  3.8   CL  100  102  105  108   CO2  31  31  34*  36*   GLU  225*  237*  209*  168*   BUN  34*  39*  43*  51*   CREA  1.08*  1.21*  1.39*  1.44*   CA  9.1  8.7  8.5  9.5   MG   --   1.8   --    --    PHOS   --   2.6  2.6  1.5*   ALB   --   2.3*  2.4*  2.6*     Recent Labs      05/01/18   0407  04/30/18   1046   WBC  21.1*  19.3*   HGB  10.1*  9.3*   HCT  32.0*  30.4*   PLT  187  194     No results found for: SDES  Lab Results   Component Value Date/Time    Culture result: LIGHT APPARENT CANDIDA ALBICANS (A) 04/26/2018 09:10 AM    Culture result: NO NORMAL RESPIRATORY BERYL ISOLATED 04/26/2018 09:10 AM    Culture result: MODERATE APPARENT CANDIDA ALBICANS (A) 04/25/2018 09:40 AM    Culture result: RARE NORMAL RESPIRATORY BERYL 04/25/2018 09:40 AM    Culture result: HEAVY CANDIDA ALBICANS (A) 04/25/2018 09:40 AM    Culture result:  04/25/2018 09:40 AM     CULTURE WILL BE HELD FOR 4 WEEKS. IF THERE IS ADDITIONAL FUNGAL GROWTH, A NEW REPORT WILL FOLLOW.      Recent Results (from the past 24 hour(s))   RENAL FUNCTION PANEL    Collection Time: 04/30/18 10:46 AM   Result Value Ref Range    Sodium 139 136 - 145 mmol/L    Potassium 4.6 3.5 - 5.1 mmol/L    Chloride 102 97 - 108 mmol/L    CO2 31 21 - 32 mmol/L    Anion gap 6 5 - 15 mmol/L    Glucose 237 (H) 65 - 100 mg/dL    BUN 39 (H) 6 - 20 MG/DL Creatinine 1.21 (H) 0.55 - 1.02 MG/DL    BUN/Creatinine ratio 32 (H) 12 - 20      GFR est AA 52 (L) >60 ml/min/1.73m2    GFR est non-AA 43 (L) >60 ml/min/1.73m2    Calcium 8.7 8.5 - 10.1 MG/DL    Phosphorus 2.6 2.6 - 4.7 MG/DL    Albumin 2.3 (L) 3.5 - 5.0 g/dL   MAGNESIUM    Collection Time: 04/30/18 10:46 AM   Result Value Ref Range    Magnesium 1.8 1.6 - 2.4 mg/dL   CBC WITH AUTOMATED DIFF    Collection Time: 04/30/18 10:46 AM   Result Value Ref Range    WBC 19.3 (H) 3.6 - 11.0 K/uL    RBC 3.22 (L) 3.80 - 5.20 M/uL    HGB 9.3 (L) 11.5 - 16.0 g/dL    HCT 30.4 (L) 35.0 - 47.0 %    MCV 94.4 80.0 - 99.0 FL    MCH 28.9 26.0 - 34.0 PG    MCHC 30.6 30.0 - 36.5 g/dL    RDW 23.1 (H) 11.5 - 14.5 %    PLATELET 467 054 - 870 K/uL    MPV 10.6 8.9 - 12.9 FL    NRBC 0.5 (H) 0  WBC    ABSOLUTE NRBC 0.09 (H) 0.00 - 0.01 K/uL    NEUTROPHILS 82 (H) 32 - 75 %    LYMPHOCYTES 9 (L) 12 - 49 %    MONOCYTES 8 5 - 13 %    EOSINOPHILS 0 0 - 7 %    BASOPHILS 0 0 - 1 %    IMMATURE GRANULOCYTES 1 (H) 0.0 - 0.5 %    ABS. NEUTROPHILS 15.9 (H) 1.8 - 8.0 K/UL    ABS. LYMPHOCYTES 1.7 0.8 - 3.5 K/UL    ABS. MONOCYTES 1.5 (H) 0.0 - 1.0 K/UL    ABS. EOSINOPHILS 0.0 0.0 - 0.4 K/UL    ABS. BASOPHILS 0.0 0.0 - 0.1 K/UL    ABS. IMM.  GRANS. 0.2 (H) 0.00 - 0.04 K/UL    DF AUTOMATED      RBC COMMENTS ANISOCYTOSIS  2+        RBC COMMENTS MACROCYTOSIS  1+        RBC COMMENTS STOMATOCYTES  PRESENT        RBC COMMENTS TARGET CELLS  PRESENT        RBC COMMENTS OVALOCYTES  PRESENT       GLUCOSE, POC    Collection Time: 04/30/18 12:05 PM   Result Value Ref Range    Glucose (POC) 258 (H) 65 - 100 mg/dL    Performed by [address] [place] [address], POC    Collection Time: 04/30/18  4:29 PM   Result Value Ref Range    Glucose (POC) 188 (H) 65 - 100 mg/dL    Performed by [address] [place] [address], POC    Collection Time: 04/30/18 11:05 PM   Result Value Ref Range    Glucose (POC) 166 (H) 65 - 100 mg/dL    Performed by Salvador Aguilar    CBC WITH AUTOMATED DIFF    Collection Time: 05/01/18  4:07 AM   Result Value Ref Range    WBC 21.1 (H) 3.6 - 11.0 K/uL    RBC 3.46 (L) 3.80 - 5.20 M/uL    HGB 10.1 (L) 11.5 - 16.0 g/dL    HCT 32.0 (L) 35.0 - 47.0 %    MCV 92.5 80.0 - 99.0 FL    MCH 29.2 26.0 - 34.0 PG    MCHC 31.6 30.0 - 36.5 g/dL    RDW 23.1 (H) 11.5 - 14.5 %    PLATELET 478 483 - 052 K/uL    MPV 10.5 8.9 - 12.9 FL    NRBC 0.7 (H) 0  WBC    ABSOLUTE NRBC 0.15 (H) 0.00 - 0.01 K/uL    NEUTROPHILS 89 (H) 32 - 75 %    LYMPHOCYTES 3 (L) 12 - 49 %    MONOCYTES 4 (L) 5 - 13 %    EOSINOPHILS 1 0 - 7 %    BASOPHILS 0 0 - 1 %    METAMYELOCYTES 1 (H) 0 %    MYELOCYTES 2 (H) 0 %    IMMATURE GRANULOCYTES 0 %    ABS. NEUTROPHILS 18.8 (H) 1.8 - 8.0 K/UL    ABS. LYMPHOCYTES 0.6 (L) 0.8 - 3.5 K/UL    ABS. MONOCYTES 0.8 0.0 - 1.0 K/UL    ABS. EOSINOPHILS 0.2 0.0 - 0.4 K/UL    ABS. BASOPHILS 0.0 0.0 - 0.1 K/UL    ABS. IMM. GRANS. 0.0 K/UL    DF MANUAL      RBC COMMENTS ANISOCYTOSIS  1+        RBC COMMENTS OVALOCYTES  PRESENT        RBC COMMENTS POLYCHROMASIA  PRESENT       METABOLIC PANEL, BASIC    Collection Time: 05/01/18  4:07 AM   Result Value Ref Range    Sodium 138 136 - 145 mmol/L    Potassium 4.2 3.5 - 5.1 mmol/L    Chloride 100 97 - 108 mmol/L    CO2 31 21 - 32 mmol/L    Anion gap 7 5 - 15 mmol/L    Glucose 225 (H) 65 - 100 mg/dL    BUN 34 (H) 6 - 20 MG/DL    Creatinine 1.08 (H) 0.55 - 1.02 MG/DL    BUN/Creatinine ratio 31 (H) 12 - 20      GFR est AA 59 (L) >60 ml/min/1.73m2    GFR est non-AA 49 (L) >60 ml/min/1.73m2    Calcium 9.1 8.5 - 10.1 MG/DL   GLUCOSE, POC    Collection Time: 05/01/18  8:23 AM   Result Value Ref Range    Glucose (POC) 216 (H) 65 - 100 mg/dL    Performed by Yessenia Sarabia I have reviewed the flowsheets. Chart and Pertinent Notes have been reviewed. No change in PMH ,family and social history from Consult note.       Felipa Yip MD

## 2018-05-01 NOTE — PROGRESS NOTES
Physical Therapy  5/1/18    Patient chart reviewed. Not available for PT at this time due to bronch at bedside. Will follow up later this afternoon as able/appropriate. Recommend continued AROM/PROM and elevated HOB as tolerated by RN and family. Mishel Bustamante, PT, DPT

## 2018-05-01 NOTE — PROGRESS NOTES
Problem: Dysphagia (Adult)  Goal: *Acute Goals and Plan of Care (Insert Text)  Speech pathology goals  Initiated 4/30/2018  1. Patient will tolerate regular/thin liquid diet with no overt s/s aspiration within 7 days MET 5/1/2018   2. Patient will participate in [facility] as clinically indicated MET 5/1/2018   Speech Pathology Modified barium swallow Study/discharge  Patient: Ninfa Tolliver (69 y.o. female)  Date: 5/1/2018  Primary Diagnosis: SOB (shortness of breath)  colon  Anemia  COLON    5 Days Post-Op   Precautions:   Fall, DNR    ASSESSMENT :  Based on the objective data described below, the patient presents with mild oral dysphagia that is overall functional for age and dentition. Mildly slow mastication and posterior propulsion with solids and occasional mild premature spillage with large successive sips of thin liquids. Swallow initiation timely at the valleculae and functional tongue base retraction and hyolaryngeal elevation and excursion. No penetration, aspiration or pharyngeal residue including with large successive straw sips. At increased risk for aspiration during periods of poor respiratory status. Skilled therapy provided by a speech-language pathologist is not indicated at this time. PLAN :  Recommendations and Planned Interventions:  --regular diet with foods cut into bite sized pieces (baseline for dentition)  --slow rate, stop with fatigue. Hold during periods of decreased respiratory status. --meds as tolerated  Discharge Recommendations: None     SUBJECTIVE:   Patient alert, cooperative.     OBJECTIVE:     Past Medical History:   Diagnosis Date    Anemia 9/2/2009    Colon cancer ([facility] [address] 9/2/2009    surgery/chemo    Colon cancer ([facility] [address] 9/2/2009    DM (diabetes mellitus) ([facility] [address] 9/2/2009    GERD (gastroesophageal reflux disease)     H/O: CVA 9/2/2009    slight l sided weakness    Hypothyroid 9/2/2009    Ill-defined condition     seasonal allergies    Microalbuminuria 9/2/2009    Osteoporosis 9/2/2009    Other and unspecified hyperlipidemia 1/27/2010    Rheumatoid arthritis involving ankle ([address] 9/28/2016    Rheumatoid arthritis(714.0) 9/2/2009    Unspecified essential hypertension 9/2/2009    Weakness due to cerebrovascular accident      Past Surgical History:   Procedure Laterality Date    HX COLECTOMY      HX HYSTERECTOMY      HX OTHER SURGICAL      colonoscopies numerous since 1993     Prior Level of Function/Home Situation:   Home Situation  Home Environment: Private residence  # Steps to Enter: 4  One/Two Story Residence: Two story, live on 1st floor  Living Alone: Yes  Support Systems: Home care staff, Child(madi)  Patient Expects to be Discharged to[de-identified] Unknown  Current DME Used/Available at Home: Safety frame toliet  Tub or Shower Type: Tub/Shower combination  Diet prior to admission: regular with foods cut into bite sized pieces  Current Diet:  regular   Radiologist: Dr. Vince Alejandro: Lateral;Fluoro  Patient Position: upright in hausted chair     Trial 1:   Consistency Presented: Thin liquid;Puree; Solid   How Presented: Self-fed/presented;Cup/sip;Cup/gulp;Straw;Successive swallows;Spoon   Consistency Amount:  (250cc thin Ba, 1 tbsp Ba paste )   Bolus Acceptance: No impairment   Bolus Formation/Control: Impaired (slow but functional mastication ): Premature spillage (trace; occasional )   Propulsion: Delayed (# of seconds) (mild with solids )   Oral Residue: None   Initiation of Swallow: Triggered at vallecula   Timing: No impairment   Penetration: None   Aspiration/Timing: No evidence of aspiration   Pharyngeal Clearance: No residue           Decreased Tongue Base Retraction?: No  Laryngeal Elevation: WFL (within functional limits)  Aspiration/Penetration Score: 1 (No penetration or aspiration-Contrast does not enter the airway)  Pharyngeal Symmetry: Not assessed  Pharyngeal-Esophageal Segment: No impairment  Pharyngeal Dysfunction: None    Oral Phase Severity: Mild  Pharyngeal Phase Severity: N/A          G Codes: In compliance with CMSs Claims Based Outcome Reporting, the following G-code set was chosen for this patient based the use of the NOMS functional outcome to quantify this patient's level of swallowing impairment. Using the NOMS, the patient was determined to be at level 7 for swallow function which correlates with the CH= 0% level of severity. Based on the objective assessment provided within this note, the current, goal, and discharge g-codes are as follows:    Swallow  Swallowing:   Swallow Current Status CH= 0%   Swallow Goal Status CH= 0%   Swallow D/C Status CH= 0%      NOMS Swallowing Levels:  Level 1 (CN): NPO  Level 2 (CM): NPO but takes consistency in therapy  Level 3 (CL): Takes less than 50% of nutrition p.o. and continues with nonoral feedings; and/or safe with mod cues; and/or max diet restriction  Level 4 (CK): Safe swallow but needs mod cues; and/or mod diet restriction; and/or still requires some nonoral feeding/supplements  Level 5 (CJ): Safe swallow with min diet restriction; and/or needs min cues  Level 6 (CI): Independent with p.o.; rare cues; usually self cues; may need to avoid some foods or needs extra time  Level 7 ([address] [place] [address]): Independent for all p.o.  ERI. (2003). National Outcomes Measurement System (NOMS): Adult Speech-Language Pathology User's Guide. COMMUNICATION/EDUCATION:     The patients plan of care including findings from MBS, recommendations, and recommended diet changes were discussed with: Registered Nurse. [x]   Patient/family have participated as able and agree with findings and recommendations. []   Patient is unable to participate in plan of care at this time. Thank you for this referral.  Pepe Kang M.CD.  CCC-SLP   Time Calculation: 15 mins

## 2018-05-01 NOTE — PROGRESS NOTES
Day #8 of Meropenem  Indication:  Pneumonia / mucous plugging  Current regimen:  500 mg IV Q 12 hr  Abx regimen: Fluconazole + Levaquin + Meropenem  Recent Labs      18   0407  18   1046  18   0904   WBC  21.1*  19.3*   --    CREA  1.08*  1.21*  1.39*   BUN  34*  39*  43*     Est CrCl: ~30-35 ml/min; UO: >1 ml/kg/hr  Temp (24hrs), Av.2 °F (36.8 °C), Min:97.9 °F (36.6 °C), Max:98.7 °F (37.1 °C)    Cultures:    Blood: NG, final   Urine: >100k E.coli (R-Amp) + 1k Pseudomonas spp, final   Blood: NG, final   Urine: 25k GNRs (see above), final   Blood: NG, final   Urine: NG, final   BAL: moderate apparent C.albicans, final   BAL: light apparent C.albicans, final    Plan: Given the patient's improving Scr, the dose of meropenem will be adjusted to 500 mg IV Q 8 hr per [facility] P&T Committee Protocol with respect to renal function. Pharmacy will continue to monitor patient daily and will make dosage adjustments based upon changing renal function.

## 2018-05-01 NOTE — PROGRESS NOTES
0800: Pt made NPO for bronch per pulmonary PA.   1030: Pt needing modified swallow evaluation. Per speech, will be done around 1330.   1130: Dr. Taylor  at bedside for bronch. 1248: Pt oral temp 100.8. PRN tylenol given per order. [id number]: Pt off floor for swallow eval.   1530: Pt with red/pink area to gluteal fold/sacrum. Wound care consult placed.

## 2018-05-01 NOTE — ROUTINE PROCESS
Bedside and Verbal shift change report given to Berta (oncoming nurse) by Oren Masters (offgoing nurse). Report included the following information SBAR, Kardex, Intake/Output, MAR, Recent Results, Cardiac Rhythm NSR and Alarm Parameters .

## 2018-05-01 NOTE — PROGRESS NOTES
Hematology-Oncology Progress Note    Ninfa Tolliver  1935  [id number]  5/1/2018    Follow-up for: met.  Breast cancer     [x]        Chart notes since last visit reviewed   [x]        Medications reviewed for allergies and interactions       Case discussed with the following:         []                            []        Nursing Staff                                                                         []        Pathologist                                                                        [x]        FAMILY      Subjective:     Spoke with patient who complains of: pt is awake, alert, breathing a little bit more labored today,,,    Objective:     Patient Vitals for the past 24 hrs:   BP Temp Pulse Resp SpO2 Weight   05/01/18 1140 131/57 - 99 23 98 % -   05/01/18 1135 142/56 - 99 26 97 % -   05/01/18 1130 142/78 - (!) 102 27 99 % -   05/01/18 1125 131/55 - 89 23 99 % -   05/01/18 1100 156/73 - 98 23 98 % -   05/01/18 1000 155/67 - 98 28 98 % -   05/01/18 0900 147/62 - 100 28 97 % -   05/01/18 0805 - - - - 99 % -   05/01/18 0800 130/52 97.9 °F (36.6 °C) 89 23 99 % -   05/01/18 0700 137/56 - 97 25 99 % -   05/01/18 0600 134/59 - 95 22 98 % -   05/01/18 0500 148/64 - (!) 101 26 99 % 59.9 kg (132 lb 0.9 oz)   05/01/18 0413 - - - - 100 % -   05/01/18 0400 139/77 98.4 °F (36.9 °C) (!) 103 24 99 % -   05/01/18 0300 115/58 - 98 25 99 % -   05/01/18 0200 126/68 - (!) 106 27 98 % -   05/01/18 0100 145/84 - (!) 121 (!) 31 97 % -   05/01/18 0059 145/84 - (!) 121 - - -   05/01/18 0000 156/76 98.7 °F (37.1 °C) (!) 123 27 97 % -   04/30/18 2301 138/76 - (!) 115 - - -   04/30/18 2300 138/76 - (!) 115 28 98 % -   04/30/18 2200 132/69 - (!) 110 (!) 32 97 % -   04/30/18 2100 148/74 - (!) 112 30 100 % -   04/30/18 2039 - - - - 97 % -   04/30/18 2000 134/60 98.2 °F (36.8 °C) 99 (!) 31 97 % -   04/30/18 1900 130/57 - 94 29 98 % -   04/30/18 1800 135/60 - 100 26 98 % -   04/30/18 1727 - - - - 98 % -   04/30/18 1700 134/62 - 98 27 99 % -   04/30/18 1600 130/59 98 °F (36.7 °C) 90 23 100 % -   04/30/18 1500 128/57 - 84 21 99 % -   04/30/18 1400 108/44 - 86 26 96 % -   04/30/18 1321 - - - - 100 % -   04/30/18 1300 110/90 - 82 20 100 % -   04/30/18 1200 120/64 98.1 °F (36.7 °C) 84 22 98 % -       REVIEW OF SYSTEMS:    Constitutional: negative fever, negative chills, negative weight loss  Eyes:   negative visual changes  ENT:   negative sore throat, tongue or lip swelling  Respiratory:  negative cough, negative dyspnea  Cards:  negative for chest pain, palpitations, lower extremity edema  GI:   negative for nausea, vomiting, diarrhea, and abdominal pain  Neuro:  negative for headaches, dizziness, vertigo  []                        Full ROS o/w normal/non contributor    Constitutional:  Patient looks  [x]        Sick  []        Frail  []        Better                                                 []        Depressed    HEENT:  [x]   NC                         []   AT               []    ALOPECIA           Eyes: [x]   Normal               []    Icteric  Oropharynx: []    Normal                  []  Thrush               []   Dry  Mucositis: []    None                 Grade: []        I  []        II  []        III  []        IV  Neck:   [x]   Supple                  []  Rigid      Breast.. Left upper outer quadrant mass            Cor tachy  Abd.  Soft non tender  Ext, no edema  Skin:  Intact [x]           Purpura []        Rash: [x]   ABSENT       []  PRESENT  Neuro:  []        Normal  [x]        Confused lethargic    Available labs reviewed:  Labs:    Recent Results (from the past 24 hour(s))   GLUCOSE, POC    Collection Time: 04/30/18 12:05 PM   Result Value Ref Range    Glucose (POC) 258 (H) 65 - 100 mg/dL    Performed by [email or web address], POC    Collection Time: 04/30/18  4:29 PM   Result Value Ref Range    Glucose (POC) 188 (H) 65 - 100 mg/dL    Performed by [email or web address], POC    Collection Time: 04/30/18 11:05 PM   Result Value Ref Range    Glucose (POC) 166 (H) 65 - 100 mg/dL    Performed by Salvador [facility]    CBC WITH AUTOMATED DIFF    Collection Time: 05/01/18  4:07 AM   Result Value Ref Range    WBC 21.1 (H) 3.6 - 11.0 K/uL    RBC 3.46 (L) 3.80 - 5.20 M/uL    HGB 10.1 (L) 11.5 - 16.0 g/dL    HCT 32.0 (L) 35.0 - 47.0 %    MCV 92.5 80.0 - 99.0 FL    MCH 29.2 26.0 - 34.0 PG    MCHC 31.6 30.0 - 36.5 g/dL    RDW 23.1 (H) 11.5 - 14.5 %    PLATELET 535 009 - 282 K/uL    MPV 10.5 8.9 - 12.9 FL    NRBC 0.7 (H) 0  WBC    ABSOLUTE NRBC 0.15 (H) 0.00 - 0.01 K/uL    NEUTROPHILS 89 (H) 32 - 75 %    LYMPHOCYTES 3 (L) 12 - 49 %    MONOCYTES 4 (L) 5 - 13 %    EOSINOPHILS 1 0 - 7 %    BASOPHILS 0 0 - 1 %    METAMYELOCYTES 1 (H) 0 %    MYELOCYTES 2 (H) 0 %    IMMATURE GRANULOCYTES 0 %    ABS. NEUTROPHILS 18.8 (H) 1.8 - 8.0 K/UL    ABS. LYMPHOCYTES 0.6 (L) 0.8 - 3.5 K/UL    ABS. MONOCYTES 0.8 0.0 - 1.0 K/UL    ABS. EOSINOPHILS 0.2 0.0 - 0.4 K/UL    ABS. BASOPHILS 0.0 0.0 - 0.1 K/UL    ABS. IMM.  GRANS. 0.0 K/UL    DF MANUAL      RBC COMMENTS ANISOCYTOSIS  1+        RBC COMMENTS OVALOCYTES  PRESENT        RBC COMMENTS POLYCHROMASIA  PRESENT       METABOLIC PANEL, BASIC    Collection Time: 05/01/18  4:07 AM   Result Value Ref Range    Sodium 138 136 - 145 mmol/L    Potassium 4.2 3.5 - 5.1 mmol/L    Chloride 100 97 - 108 mmol/L    CO2 31 21 - 32 mmol/L    Anion gap 7 5 - 15 mmol/L    Glucose 225 (H) 65 - 100 mg/dL    BUN 34 (H) 6 - 20 MG/DL    Creatinine 1.08 (H) 0.55 - 1.02 MG/DL    BUN/Creatinine ratio 31 (H) 12 - 20      GFR est AA 59 (L) >60 ml/min/1.73m2    GFR est non-AA 49 (L) >60 ml/min/1.73m2    Calcium 9.1 8.5 - 10.1 MG/DL   HEPATIC FUNCTION PANEL    Collection Time: 05/01/18  4:07 AM   Result Value Ref Range    Protein, total 6.7 6.4 - 8.2 g/dL    Albumin 2.2 (L) 3.5 - 5.0 g/dL    Globulin 4.5 (H) 2.0 - 4.0 g/dL    A-G Ratio 0.5 (L) 1.1 - 2.2      Bilirubin, total 0.6 0.2 - 1.0 MG/DL    Bilirubin, direct 0.2 0.0 - 0.2 MG/DL    Alk. phosphatase 484 (H) 45 - 117 U/L    AST (SGOT) 75 (H) 15 - 37 U/L    ALT (SGPT) 26 12 - 78 U/L   GLUCOSE, POC    Collection Time: 05/01/18  8:23 AM   Result Value Ref Range    Glucose (POC) 216 (H) 65 - 100 mg/dL    Performed by Gaye Overall        Available Xrays reviewed:    Chemotherapy monitored and toxicities assessed:    Assessment and Plan   1   metastatic breast cancer. ER+, RI+, her-2 negative,,,The bone scan shows diffuse mets, the ca 2729 and cea are elevated . I, will continue  Arimidex (hormonal AI therapy) started on 4/24    1. Anemia. .. Pt has bone mets, suspect this is due to chronic disease +- marrow involvement,  started procrit weekly on 4/23,,, given one unit prbc 4/23, and on 4/26 as well, count stable at 10.1      2. CHF. Tony Avelina Per cardiology. .     3. AMS. . ,marked improvement,,She has no evidence of brain mets on MRI ,but does have calvarial mets    4. Pulm.  Requiring bronchoscopy to clear secretions again today,       Gerry Gomez MD

## 2018-05-01 NOTE — PALLIATIVE CARE
Palliative Medicine Social Work    ADDENDUM:  1430  Met with son, Estela Snowden, later in the day. He is coping fairly well; feels a bit more relaxed when she is doing better, though does not trust himself to fully relax; doesn't trust she is going to remain stable; accepts her tenuous state. Will continue to support and clarify goalsl    Dr. Osvaldo Davila and I met with patient just prior to her bronchoscopy. She was alert on NC and engaging. Introduced ourselves, as she has been confused or on BiPAP during other visits. She is glad to be feeling better; denies any worries; feels she is getting good information from her son about all that is happening. Will continue to support as more is known about her condition. Thank you for the opportunity to be involved in the care of Ms. Ledesma. Mariaelena Babcock, [facility], [facility]-  Palliative Medicine   [employer] ® ACP Facilitator   [phone]

## 2018-05-01 NOTE — PROGRESS NOTES
Problem: Falls - Risk of  Goal: *Absence of Falls  Document Yaron Fall Risk and appropriate interventions in the flowsheet.    Outcome: Progressing Towards Goal  Fall Risk Interventions:  Mobility Interventions: Assess mobility with egress test, Communicate number of staff needed for ambulation/transfer, Patient to call before getting OOB, PT Consult for mobility concerns, PT Consult for assist device competence, Strengthening exercises (ROM-active/passive), Utilize walker, cane, or other assitive device, Utilize gait belt for transfers/ambulation    Mentation Interventions: Adequate sleep, hydration, pain control, Bed/chair exit alarm, Increase mobility, Gait belt with transfers/ambulation, More frequent rounding, Reorient patient, Self-releasing belt, Toileting rounds, Update white board    Medication Interventions: Evaluate medications/consider consulting pharmacy, Assess postural VS orthostatic hypotension, Patient to call before getting OOB, Teach patient to arise slowly, Utilize gait belt for transfers/ambulation    Elimination Interventions: Call light in reach, Patient to call for help with toileting needs    History of Falls Interventions: Door open when patient unattended, Investigate reason for fall, Room close to nurse's station, Utilize gait belt for transfer/ambulation

## 2018-05-01 NOTE — PROGRESS NOTES
2321: Patient tachycardic in the 110's-120's post neb treatment. Lungs with scattered rales/rhonchi but patient denies SOB, RR 25-30, gurgling sounds audible in the back of patient's throat. Paged Dr. Mary Jane Covarrubias for an order for stat CXR.    2327: Spoke with Dr. Mary Jane Covarrubias and informed him of patient. New orders received for stat CXR.    9264: CXR read, paged Dr. Lina Farrlel on call with pulmonary to inform her of results. 0020: Dr. Lina Farrell never called back. Repaged. 0021: Spoke with Dr. Lina Farrell and informed her of patient and chest x-ray results. New orders received to start BiPAP 15/5 overnight and to give 40 mg of lasix IV now. 0200: Patient seems more comfortable on BiPAP, BP stable, HR trending down in the low 100's, RR in mid 20's, oxygen saturation 98%. Lasix given and patient has voided 200 ml via purewick. No signs of distress noted at this time. Will monitor. Shift Summary: Patient had an eventful shift. Abnormal chest x-ray detected overnight, patient placed on BiPAP and lasix given, patient diuresing well, lung sounds have improved. VSS at this time. Patient denies pain. Tolerated BiPAP well overnight.

## 2018-05-01 NOTE — PROGRESS NOTES
[facility]  Bronchoscopy Report    Procedure: Therapeutic bronchoscopy. Indication: Mucus Plugging    Consent/Treatment: Informed consent was obtained from the  family after risks, benefits and alternatives were explained. Timeout verified the correct patient and correct procedure. Anesthesia:   Topical sedation to nares, mouth, and tracheobronchial tree with lidocaine  Moderate sedation with precedex 1.2 was used    Moderate (conscious) sedation was administered by the endoscopy nurse under the direct supervision of the endoscopist.  Heart rate, EKG, blood pressure, resp rate, adequacy of pulmonary ventilation, Oxygen saturation, and response to care were monitored. Total physician intraservice time was 12 minutes    Procedure Details:   -- The bronchoscope was introduced orally with use of a bite block. -- The vocal cords were found to be normal.  -- The trachea and nikolay were completely inspected and were found to be normal.  -- The right-sided endobronchial anatomy was completely inspected and scant secretions were cleared. -- The left-sided endobronchial anatomy was completely inspected and thick mucus was cleared from the L mainstem bronchus.      Specimens:   Bronchial washings were sent for  microbiology, cytology and fungal culture    Rapid On-Site Evaluation: n/a    Complications: none    Estimated Blood Loss: Minimal    Mucus plugging in L mainstem > than R  No obvious endobronchial lesions noted    Secretions cleared  Follow up cultures and cytology  Continue pulmonary toilet    Robin Rowan MD

## 2018-05-01 NOTE — PROGRESS NOTES
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  Current regimen:  100 mg IV Q 24 hr  Abx regimen: Fluconazole + Levaquin + Meropenem  Recent Labs      18   0407  18   1046  18   0904   WBC  21.1*  19.3*   --    CREA  1.08*  1.21*  1.39*   BUN  34*  39*  43*     Est CrCl: ~30-35 ml/min; UO: >1 ml/kg/hr  Temp (24hrs), Av.2 °F (36.8 °C), Min:97.9 °F (36.6 °C), Max:98.7 °F (37.1 °C)    Cultures:    Blood: NG, final   Urine: >100k E.coli (R-Amp) + 1k Pseudomonas spp, final   Blood: NG, final   Urine: 25k GNRs (see above), final   Blood: NG, final   Urine: NG, final   BAL: moderate apparent C.albicans, final   BAL: light apparent C.albicans, final    Plan: Given the patient's improving Scr, the dose of fluconazole will be adjusted to 200 mg IV Q 24 hr per [facility] P&T Committee Protocol with respect to renal function. Pharmacy will continue to monitor patient daily and will make dosage adjustments based upon changing renal function.

## 2018-05-01 NOTE — PROGRESS NOTES
Hospitalist Progress Note  Rampart File, MD  Answering service: [phone] OR [phone] from in house phone        Date of Service:  2018  NAME:  Yusra Arnold  :  1935  MRN:  [id number]      Admission Summary:   The patient is an 82-year-old female with history of diabetes and rheumatoid arthritis who initially presented to the emergency room via EMS with complaints of shortness of breath    Interval history / Subjective:   Ms Josie Garcia is alert and awake. Over night events noted,she has respiratory distress,CXR showed worsening complete opacification of left lung. She was given lasix and placed on BIPAP with improvement. She is now on nasal canula. Her family at bedside. Assessment & Plan:     Acute on chronic hypoxic/hypercarbic respiratory failure due to pulmonary edema, pneumonia  -s/p bronchoscopy on  and on  suctioned and cleared  -respiratory culture grow on  and  grow candida albicans, on diflucan  -,resp distress and CXR worsening worsening of near complete left hemithorax opacification.  -On and off BIPAP  -For bronch again today    Acute encephalopathy possible due to metabolic. Resolved  -CT head on  volume loss and minimal white matter disease. No acute intracranial [place], Sinus mucosal inflammatory change  -MRI of brain  Study limited by motion artifact, volume loss and white matter disease. No definite intracranial metastasis however no intravenous contrast was administered due to poor renal function. 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     Sepsis secondary to E Coli UTI, pneumonia  POA  -on zosyn, vancomycin, meropenem, and fluconazole   -urine cx   grow e coli, pseudomanas sp  -blood cx on ,  and  no growth    NSTEMI  -on fish oil  -restarted on aspirin on ,  plavix held due to low Hgb  -IV metoprolol is added on , switched to po coreg  -denied chest pain  -cardiologist on board    Acute systolic CHF NYHA Class IV  -bumex is stopped, on hydralazine, spironolactone started 4/28, coreg on 4/30  -not on ACEi/ ARB due to renal insufficiency   -monitor I/O and daily weight    JEROD on CKD stage III  -creatinine improving  -bumex is stopped on 4/28  -monitor renal function, urine output  -nephrologist on board    Possible metastatic left breast cancer  -on armidex   -Hem/Onc on board    HTN  -BP stablecon hydralazine, coreg and spironolactone,  monitor BP    DM-II  -continue insulin sliding scale, monitor finger stick glucose    Hypothyroidism  -continue synthroid    Hx of GERD  -continue pepcid    Elevated liver enzyme   -possible related to liver lesion, monitor LFT    Anemia multifactorial  -Hgb 6.7, s/p 2 units pRBC on 4/26,   -Hgb 9.3, monitor H/H    Hx of colon cancer 25 years ago  -according to son, there is a work-up planned with [facility] regarding the possibility of recurrent CA (based on lesions seen in the calvarium on MRI - 3/19). -hem/onc on board    H/o stroke with residual weakness on the left    Diet,suspect silent aspiration. Speech recommended:  --Continue regular/thin liquid diet, with son cutting up solids as needed (patient's baseline due to dentition)  --NO STRAWS  --Upright for all PO intake  --May do MBS tomorrow        Code status: DNR  DVT prophylaxis:SCD    Care Plan discussed with: Patient/Family and Nurse  Disposition:in ICU    5/1,updated son at bedside and questions answered  Contact sonGermania .      Hospital Problems  Date Reviewed: 4/16/2018          Codes Class Noted POA    Acute systolic heart failure ([facility] [address] ICD-10-CM: I50.21  ICD-9-CM: 428.21  4/24/2018 Unknown        Altered mental status, unspecified ICD-10-CM: R41.82  ICD-9-CM: 780.97  4/23/2018 Unknown        * (Principal)SOB (shortness of breath) ICD-10-CM: R06.02  ICD-9-CM: 786.05  4/16/2018 Unknown                Vital Signs:    Last 24hrs VS reviewed since prior progress note. Most recent are:  Visit Vitals    /52    Pulse 89    Temp 97.9 °F (36.6 °C)    Resp 23    Ht 5' 1\" (1.549 m)    Wt 59.9 kg (132 lb 0.9 oz)    SpO2 99%    BMI 24.95 kg/m2         Intake/Output Summary (Last 24 hours) at 05/01/18 0901  Last data filed at 05/01/18 0800   Gross per 24 hour   Intake              700 ml   Output             1895 ml   Net            -1195 ml        Physical Examination:             Constitutional:  Alert and conversant,on nasal canula   ENT:  Oral mucous moist, oropharynx benign. Neck supple,    Resp:  Decrease bronchial breath sound, few wheezing and rhonic bilaterally. No accessory muscle use   CV:  Regular rhythm, normal rate, no murmurs, gallops, rubs    GI:  Soft, non distended, non tender. normoactive bowel sounds, no hepatosplenomegaly     Musculoskeletal:  No edema    Neurologic:  Conscious and alert, oriented to place and person, answer questions, moves all extremities. Left side weak     Skin:  Good turgor, no rashes or ulcers       Data Review:    Review and/or order of clinical lab test      Labs:     Recent Labs      05/01/18   0407  04/30/18   1046   WBC  21.1*  19.3*   HGB  10.1*  9.3*   HCT  32.0*  30.4*   PLT  187  194     Recent Labs      05/01/18   0407  04/30/18   1046  04/29/18   0904  04/28/18   1432   NA  138  139  146*  150*   K  4.2  4.6  3.3*  3.8   CL  100  102  105  108   CO2  31  31  34*  36*   BUN  34*  39*  43*  51*   CREA  1.08*  1.21*  1.39*  1.44*   GLU  225*  237*  209*  168*   CA  9.1  8.7  8.5  9.5   MG   --   1.8   --    --    PHOS   --   2.6  2.6  1.5*     Recent Labs      04/30/18   1046  04/29/18   0904  04/28/18   1432   ALB  2.3*  2.4*  2.6*     No results for input(s): INR, PTP, APTT in the last 72 hours. No lab exists for component: INREXT, INREXT   No results for input(s): FE, TIBC, PSAT, FERR in the last 72 hours. No results found for: FOL, RBCF   No results for input(s): PH, PCO2, PO2 in the last 72 hours.   No results for input(s): CPK, CKNDX, TROIQ in the last 72 hours.     No lab exists for component: CPKMB  Lab Results   Component Value Date/Time    Cholesterol, total 74 04/16/2018 04:21 AM    HDL Cholesterol 25 04/16/2018 04:21 AM    LDL, calculated 31.6 04/16/2018 04:21 AM    Triglyceride 87 04/16/2018 04:21 AM    CHOL/HDL Ratio 3.0 04/16/2018 04:21 AM     Lab Results   Component Value Date/Time    Glucose (POC) 216 (H) 05/01/2018 08:23 AM    Glucose (POC) 166 (H) 04/30/2018 11:05 PM    Glucose (POC) 188 (H) 04/30/2018 04:29 PM    Glucose (POC) 258 (H) 04/30/2018 12:05 PM    Glucose (POC) 234 (H) 04/30/2018 08:32 AM     Lab Results   Component Value Date/Time    Color YELLOW/STRAW 04/20/2018 04:30 AM    Appearance TURBID (A) 04/20/2018 04:30 AM    Specific gravity 1.029 04/20/2018 04:30 AM    pH (UA) 5.0 04/20/2018 04:30 AM    Protein 100 (A) 04/20/2018 04:30 AM    Glucose NEGATIVE  04/20/2018 04:30 AM    Ketone NEGATIVE  04/20/2018 04:30 AM    Bilirubin NEGATIVE  04/20/2018 04:30 AM    Urobilinogen 0.2 04/20/2018 04:30 AM    Nitrites NEGATIVE  04/20/2018 04:30 AM    Leukocyte Esterase LARGE (A) 04/20/2018 04:30 AM    Epithelial cells FEW 04/20/2018 04:30 AM    Bacteria 3+ (A) 04/20/2018 04:30 AM    WBC >100 (H) 04/20/2018 04:30 AM    RBC 5-10 04/20/2018 04:30 AM         Medications Reviewed:     Current Facility-Administered Medications   Medication Dose Route Frequency    aspirin chewable tablet 81 mg  81 mg Oral DAILY    carvedilol (COREG) tablet 3.125 mg  3.125 mg Oral BID WITH MEALS    spironolactone (ALDACTONE) tablet 25 mg  25 mg Oral DAILY    acetaminophen (TYLENOL) suppository 650 mg  650 mg Rectal Q4H PRN    hydrALAZINE (APRESOLINE) tablet 25 mg  25 mg Oral TID    levoFLOXacin (LEVAQUIN) 750 mg in D5W IVPB  750 mg IntraVENous Q48H    sodium chloride (NS) flush 5-10 mL  5-10 mL IntraVENous Q8H    sodium chloride (NS) flush 5-10 mL  5-10 mL IntraVENous PRN    albuterol-ipratropium (DUO-NEB) 2.5 MG-0.5 MG/3 ML  3 mL Nebulization Q4H RT    budesonide (PULMICORT) 500 mcg/2 ml nebulizer suspension  500 mcg Nebulization BID RT    acetylcysteine (MUCOMYST) 200 mg/mL (20 %) solution 200 mg  200 mg Nebulization QID RT    hydrALAZINE (APRESOLINE) 20 mg/mL injection 10 mg  10 mg IntraVENous Q6H PRN    fluconazole in 0.9% NaCl 100 mg IVPB  100 mg IntraVENous Q24H    0.9% sodium chloride infusion 250 mL  250 mL IntraVENous PRN    sodium chloride (NS) flush 20 mL  20 mL InterCATHeter PRN    sodium chloride (NS) flush 10 mL  10 mL InterCATHeter Q24H    sodium chloride (NS) flush 10 mL  10 mL InterCATHeter PRN    sodium chloride (NS) flush 10 mL  10 mL InterCATHeter Q8H    alteplase (CATHFLO) 1 mg in sterile water (preservative free) 1 mL injection  1 mg InterCATHeter PRN    bacitracin 500 unit/gram packet 1 Packet  1 Packet Topical PRN    meropenem (MERREM) 500 mg in 0.9% sodium chloride (MBP/ADV) 50 mL  0.5 g IntraVENous Q12H    sodium chloride (NS) flush 10-30 mL  10-30 mL InterCATHeter PRN    sodium chloride (NS) flush 10 mL  10 mL InterCATHeter Q24H    sodium chloride (NS) flush 10 mL  10 mL InterCATHeter PRN    sodium chloride (NS) flush 10-40 mL  10-40 mL InterCATHeter Q8H    sodium chloride (NS) flush 20 mL  20 mL InterCATHeter Q24H    anastrozole (ARIMIDEX) tablet 1 mg  1 mg Oral DAILY    0.9% sodium chloride infusion 250 mL  250 mL IntraVENous PRN    epoetin celio (EPOGEN;PROCRIT) injection 10,000 Units  10,000 Units SubCUTAneous Q MON, WED & FRI    albuterol-ipratropium (DUO-NEB) 2.5 MG-0.5 MG/3 ML  3 mL Nebulization Q6H PRN    glucose chewable tablet 16 g  4 Tab Oral PRN    dextrose (D50W) injection syrg 12.5-25 g  12.5-25 g IntraVENous PRN    glucagon (GLUCAGEN) injection 1 mg  1 mg IntraMUSCular PRN    insulin lispro (HUMALOG) injection   SubCUTAneous AC&HS    prochlorperazine (COMPAZINE) with saline injection 5 mg  5 mg IntraVENous Q8H PRN    ascorbic acid (vitamin C) (VITAMIN C) tablet 500 mg  500 mg Oral DAILY    cholecalciferol (VITAMIN D3) tablet 1,000 Units  1,000 Units Oral DAILY    folic acid (FOLVITE) tablet 1 mg  1 mg Oral DAILY    magnesium oxide (MAG-OX) tablet 400 mg  400 mg Oral DAILY    therapeutic multivitamin (THERAGRAN) tablet 1 Tab  1 Tab Oral DAILY    fish oil-omega-3 fatty acids 340-1,000 mg capsule 1 Cap  1 Cap Oral DAILY    sodium chloride (NS) flush 5-10 mL  5-10 mL IntraVENous Q8H    sodium chloride (NS) flush 5-10 mL  5-10 mL IntraVENous PRN    ondansetron (ZOFRAN) injection 4 mg  4 mg IntraVENous Q4H PRN    levothyroxine (SYNTHROID) tablet 88 mcg  88 mcg Oral ACB     ______________________________________________________________________  EXPECTED LENGTH OF STAY: 4d 12h  ACTUAL LENGTH OF STAY:          15                 Anh Christensen MD

## 2018-05-01 NOTE — ROUTINE PROCESS
0730: Bedside and Verbal shift change report given to Marlene Cortez RN (oncoming nurse) by Palmer Dhaliwal RN (offgoing nurse). Report included the following information SBAR, Kardex, ED Summary, Procedure Summary, Intake/Output, MAR, Recent Results, Med Rec Status and Cardiac Rhythm SR.     1930: Bedside and Verbal shift change report given to Lynn Salazar RN (oncoming nurse) by Marlene Cortez RN (offgoing nurse).  Report included the following information SBAR, Kardex, ED Summary, Procedure Summary, Intake/Output, MAR, Recent Results, Med Rec Status and Cardiac Rhythm SR.

## 2018-05-01 NOTE — PROGRESS NOTES
Cardiology Progress Note            Admit Date: 4/16/2018  Admit Diagnosis: SOB (shortness of breath)  colon  Anemia  COLON  Date: 5/1/2018     Time: 2:01 PM    HPI: 82 y.o. Female with new diagnosis of CHF. Presented with chief c/o of SOB, found to have pulmonary edema and EF 40% on TTE. Noted to have NSTEMI and JEROD on CKD. Pt with hx of colon cancer and now concern for metastatic breast CA. Was in ICU over weekend (4/21-4/22) for respiratory failure, sepsis, psa UTI. Improved and transferred to floor 4/24. C on hold as probable metastatic disease. Transferred to ICU on 4/23 due to AMS and SOB. Started on nitroglycerine IV for BP control. Left lung opacification on a.m. Chest XRay 4/25/18 - mucous plug, had therapeutic bronch. Repeat CXR 4/26 with opacification of Left lung noted- therapeutic bronch repeated. Therapeutic bronch planned for today. Subjective: Had increased SOB overnight, required BiPAP. Received IV lasix. CXR with worsened near complete opacification again of Left lung- . Assessment and Plan     1. NSTEMI:     -No chest pain. Medical mgmt as able given other issues. -Repeat TTE:4/27/18  EF unchanged 45% with mild hypokinesis  basal-mid anteroseptal and apical walls.   -Held ASA and Plavix on 4/26 d/t worsened anemia. ASA restarted   -Increase coreg to 6.25 mg BID   -Statin intolerant due to weakness on zocor, on repatha, last LDL 31, so will not rechallenge statin     2. Cardiomyopathy/Acute HFrEF, new:  EF 45% NYHA IV     -Currently holding ace-I/arb due to elevated creatinine, but creatinine trending down. -Increase Coreg to 6.25 mg BID today.   -Daily weights, I/Os (net -800 mls/24 hours). -On aldactone. Received lasix IV early a.m. 3. JEROD on CKD:   Creatinine improving 1.08   -Nephrology following.     4. Acute hypoxic respiratory failure:    -diuretics as per nephrology   -Pulmonary following-    5. Anemia:hgb 10.1 On procrit. 7. HTN- BP normalized   -Increase Coreg to 6.25 mg BID    -Hydralazine 25 mg TID    8. Leukocytosis: worsening, ID pending. 9. Hx of Colon cancer, now metastatic breast ca (bone, liver,calvarium mets) on Arimedex  -Hematology oncology following. 10. Advanced directives:  No shock or CPR, DNR but ?intubation still ok? Palliative care following. Will uptitrate coreg today. Leslie Montana NP  Cardiology Attending:Patient seen and examined. I agree with NP assessment and plans. Still with mucous plugging. Jamin Martin MD 5/1/2018 3:34 PM       Objective:      Physical Exam:                Visit Vitals    /62    Pulse 100    Temp 97.9 °F (36.6 °C)    Resp 28    Ht 5' 1\" (1.549 m)    Wt 59.9 kg (132 lb 0.9 oz)    SpO2 97%    BMI 24.95 kg/m2          General Appearance:   elderly female, NAD, on Bipap   Ears/Nose/Mouth/Throat:    Hearing grossly normal.         Neck:  Supple. Chest:     Decreased breath sounds on left lower lung field, on high flow O2. Cardiovascular:   Regular rate and rhythm, S1, S2 normal, no murmur,    Abdomen:    Soft, nontender, no gaurding. Extremities:  No edema bilaterally. Skin:  Warm and dry. Telemetry: Sinus rhythm- Brief run of SVT overnight.            Data Review:    Labs:    Recent Results (from the past 24 hour(s))   RENAL FUNCTION PANEL    Collection Time: 04/30/18 10:46 AM   Result Value Ref Range    Sodium 139 136 - 145 mmol/L    Potassium 4.6 3.5 - 5.1 mmol/L    Chloride 102 97 - 108 mmol/L    CO2 31 21 - 32 mmol/L    Anion gap 6 5 - 15 mmol/L    Glucose 237 (H) 65 - 100 mg/dL    BUN 39 (H) 6 - 20 MG/DL    Creatinine 1.21 (H) 0.55 - 1.02 MG/DL    BUN/Creatinine ratio 32 (H) 12 - 20      GFR est AA 52 (L) >60 ml/min/1.73m2    GFR est non-AA 43 (L) >60 ml/min/1.73m2    Calcium 8.7 8.5 - 10.1 MG/DL    Phosphorus 2.6 2.6 - 4.7 MG/DL    Albumin 2.3 (L) 3.5 - 5.0 g/dL   MAGNESIUM    Collection Time: 04/30/18 10:46 AM   Result Value Ref Range    Magnesium 1.8 1.6 - 2.4 mg/dL   CBC WITH AUTOMATED DIFF    Collection Time: 04/30/18 10:46 AM   Result Value Ref Range    WBC 19.3 (H) 3.6 - 11.0 K/uL    RBC 3.22 (L) 3.80 - 5.20 M/uL    HGB 9.3 (L) 11.5 - 16.0 g/dL    HCT 30.4 (L) 35.0 - 47.0 %    MCV 94.4 80.0 - 99.0 FL    MCH 28.9 26.0 - 34.0 PG    MCHC 30.6 30.0 - 36.5 g/dL    RDW 23.1 (H) 11.5 - 14.5 %    PLATELET 496 610 - 513 K/uL    MPV 10.6 8.9 - 12.9 FL    NRBC 0.5 (H) 0  WBC    ABSOLUTE NRBC 0.09 (H) 0.00 - 0.01 K/uL    NEUTROPHILS 82 (H) 32 - 75 %    LYMPHOCYTES 9 (L) 12 - 49 %    MONOCYTES 8 5 - 13 %    EOSINOPHILS 0 0 - 7 %    BASOPHILS 0 0 - 1 %    IMMATURE GRANULOCYTES 1 (H) 0.0 - 0.5 %    ABS. NEUTROPHILS 15.9 (H) 1.8 - 8.0 K/UL    ABS. LYMPHOCYTES 1.7 0.8 - 3.5 K/UL    ABS. MONOCYTES 1.5 (H) 0.0 - 1.0 K/UL    ABS. EOSINOPHILS 0.0 0.0 - 0.4 K/UL    ABS. BASOPHILS 0.0 0.0 - 0.1 K/UL    ABS. IMM.  GRANS. 0.2 (H) 0.00 - 0.04 K/UL    DF AUTOMATED      RBC COMMENTS ANISOCYTOSIS  2+        RBC COMMENTS MACROCYTOSIS  1+        RBC COMMENTS STOMATOCYTES  PRESENT        RBC COMMENTS TARGET CELLS  PRESENT        RBC COMMENTS OVALOCYTES  PRESENT       GLUCOSE, POC    Collection Time: 04/30/18 12:05 PM   Result Value Ref Range    Glucose (POC) 258 (H) 65 - 100 mg/dL    Performed by [facility], POC    Collection Time: 04/30/18  4:29 PM   Result Value Ref Range    Glucose (POC) 188 (H) 65 - 100 mg/dL    Performed by RIDGE ENRIQUE    GLUCOSE, POC    Collection Time: 04/30/18 11:05 PM   Result Value Ref Range    Glucose (POC) 166 (H) 65 - 100 mg/dL    Performed by Salvador Aguilar    CBC WITH AUTOMATED DIFF    Collection Time: 05/01/18  4:07 AM   Result Value Ref Range    WBC 21.1 (H) 3.6 - 11.0 K/uL    RBC 3.46 (L) 3.80 - 5.20 M/uL    HGB 10.1 (L) 11.5 - 16.0 g/dL    HCT 32.0 (L) 35.0 - 47.0 %    MCV 92.5 80.0 - 99.0 FL    MCH 29.2 26.0 - 34.0 PG    MCHC 31.6 30.0 - 36.5 g/dL    RDW 23.1 (H) 11.5 - 14.5 %    PLATELET 267 267 - 087 K/uL    MPV 10.5 8.9 - 12.9 FL    NRBC 0.7 (H) 0  WBC    ABSOLUTE NRBC 0.15 (H) 0.00 - 0.01 K/uL    NEUTROPHILS 89 (H) 32 - 75 %    LYMPHOCYTES 3 (L) 12 - 49 %    MONOCYTES 4 (L) 5 - 13 %    EOSINOPHILS 1 0 - 7 %    BASOPHILS 0 0 - 1 %    METAMYELOCYTES 1 (H) 0 %    MYELOCYTES 2 (H) 0 %    IMMATURE GRANULOCYTES 0 %    ABS. NEUTROPHILS 18.8 (H) 1.8 - 8.0 K/UL    ABS. LYMPHOCYTES 0.6 (L) 0.8 - 3.5 K/UL    ABS. MONOCYTES 0.8 0.0 - 1.0 K/UL    ABS. EOSINOPHILS 0.2 0.0 - 0.4 K/UL    ABS. BASOPHILS 0.0 0.0 - 0.1 K/UL    ABS. IMM.  GRANS. 0.0 K/UL    DF MANUAL      RBC COMMENTS ANISOCYTOSIS  1+        RBC COMMENTS OVALOCYTES  PRESENT        RBC COMMENTS POLYCHROMASIA  PRESENT       METABOLIC PANEL, BASIC    Collection Time: 05/01/18  4:07 AM   Result Value Ref Range    Sodium 138 136 - 145 mmol/L    Potassium 4.2 3.5 - 5.1 mmol/L    Chloride 100 97 - 108 mmol/L    CO2 31 21 - 32 mmol/L    Anion gap 7 5 - 15 mmol/L    Glucose 225 (H) 65 - 100 mg/dL    BUN 34 (H) 6 - 20 MG/DL    Creatinine 1.08 (H) 0.55 - 1.02 MG/DL    BUN/Creatinine ratio 31 (H) 12 - 20      GFR est AA 59 (L) >60 ml/min/1.73m2    GFR est non-AA 49 (L) >60 ml/min/1.73m2    Calcium 9.1 8.5 - 10.1 MG/DL   GLUCOSE, POC    Collection Time: 05/01/18  8:23 AM   Result Value Ref Range    Glucose (POC) 216 (H) 65 - 100 mg/dL    Performed by [facility]           Radiology:        Current Facility-Administered Medications   Medication Dose Route Frequency    naloxone (NARCAN) injection 0.4 mg  0.4 mg IntraVENous Multiple    flumazenil (ROMAZICON) 0.1 mg/mL injection 0.2 mg  0.2 mg IntraVENous Multiple    benzocaine (HURRICANE) 20 % spray   Mucous Membrane ONCE    lidocaine (XYLOCAINE) 2 % viscous solution 5 mL  5 mL Mouth/Throat ONCE    lidocaine (XYLOCAINE) 2 % jelly   Topical ONCE    lidocaine (PF) (XYLOCAINE) 20 mg/mL (2 %) injection  mg  1-20 mL Topical ONCE    midazolam (VERSED) injection 0.25-5 mg  0.25-5 mg IntraVENous Multiple    fentaNYL citrate (PF) injection 50 mcg  50 mcg IntraVENous Multiple    carvedilol (COREG) tablet 6.25 mg  6.25 mg Oral BID WITH MEALS    dexmedeTOMidine (PRECEDEX) 400 mcg in 0.9% sodium chloride 100 mL infusion  0.2-0.7 mcg/kg/hr IntraVENous TITRATE    meropenem (MERREM) 500 mg in 0.9% sodium chloride (MBP/ADV) 50 mL  0.5 g IntraVENous Q8H    fluconazole (DIFLUCAN) 200mg/100 mL IVPB (premix)  200 mg IntraVENous Q24H    aspirin chewable tablet 81 mg  81 mg Oral DAILY    spironolactone (ALDACTONE) tablet 25 mg  25 mg Oral DAILY    acetaminophen (TYLENOL) suppository 650 mg  650 mg Rectal Q4H PRN    hydrALAZINE (APRESOLINE) tablet 25 mg  25 mg Oral TID    levoFLOXacin (LEVAQUIN) 750 mg in D5W IVPB  750 mg IntraVENous Q48H    sodium chloride (NS) flush 5-10 mL  5-10 mL IntraVENous Q8H    sodium chloride (NS) flush 5-10 mL  5-10 mL IntraVENous PRN    albuterol-ipratropium (DUO-NEB) 2.5 MG-0.5 MG/3 ML  3 mL Nebulization Q4H RT    budesonide (PULMICORT) 500 mcg/2 ml nebulizer suspension  500 mcg Nebulization BID RT    acetylcysteine (MUCOMYST) 200 mg/mL (20 %) solution 200 mg  200 mg Nebulization QID RT    hydrALAZINE (APRESOLINE) 20 mg/mL injection 10 mg  10 mg IntraVENous Q6H PRN    0.9% sodium chloride infusion 250 mL  250 mL IntraVENous PRN    sodium chloride (NS) flush 20 mL  20 mL InterCATHeter PRN    sodium chloride (NS) flush 10 mL  10 mL InterCATHeter Q24H    sodium chloride (NS) flush 10 mL  10 mL InterCATHeter PRN    sodium chloride (NS) flush 10 mL  10 mL InterCATHeter Q8H    alteplase (CATHFLO) 1 mg in sterile water (preservative free) 1 mL injection  1 mg InterCATHeter PRN    bacitracin 500 unit/gram packet 1 Packet  1 Packet Topical PRN    sodium chloride (NS) flush 10-30 mL  10-30 mL InterCATHeter PRN    sodium chloride (NS) flush 10 mL  10 mL InterCATHeter Q24H    sodium chloride (NS) flush 10 mL  10 mL InterCATHeter PRN    sodium chloride (NS) flush 10-40 mL  10-40 mL InterCATHeter Q8H    sodium chloride (NS) flush 20 mL  20 mL InterCATHeter Q24H    anastrozole (ARIMIDEX) tablet 1 mg  1 mg Oral DAILY    0.9% sodium chloride infusion 250 mL  250 mL IntraVENous PRN    epoetin celio (EPOGEN;PROCRIT) injection 10,000 Units  10,000 Units SubCUTAneous Q MON, WED & FRI    albuterol-ipratropium (DUO-NEB) 2.5 MG-0.5 MG/3 ML  3 mL Nebulization Q6H PRN    glucose chewable tablet 16 g  4 Tab Oral PRN    dextrose (D50W) injection syrg 12.5-25 g  12.5-25 g IntraVENous PRN    glucagon (GLUCAGEN) injection 1 mg  1 mg IntraMUSCular PRN    insulin lispro (HUMALOG) injection   SubCUTAneous AC&HS    prochlorperazine (COMPAZINE) with saline injection 5 mg  5 mg IntraVENous Q8H PRN    ascorbic acid (vitamin C) (VITAMIN C) tablet 500 mg  500 mg Oral DAILY    cholecalciferol (VITAMIN D3) tablet 1,000 Units  1,000 Units Oral DAILY    folic acid (FOLVITE) tablet 1 mg  1 mg Oral DAILY    magnesium oxide (MAG-OX) tablet 400 mg  400 mg Oral DAILY    therapeutic multivitamin (THERAGRAN) tablet 1 Tab  1 Tab Oral DAILY    fish oil-omega-3 fatty acids 340-1,000 mg capsule 1 Cap  1 Cap Oral DAILY    sodium chloride (NS) flush 5-10 mL  5-10 mL IntraVENous Q8H    sodium chloride (NS) flush 5-10 mL  5-10 mL IntraVENous PRN    ondansetron (ZOFRAN) injection 4 mg  4 mg IntraVENous Q4H PRN    levothyroxine (SYNTHROID) tablet 88 mcg  88 mcg Oral ACB     Pt critically ill, poor prognosis, multiple severe issues. Idalia Hilton.  MARIETTA Montana     [facility] [address] [place] [address] Drive, [address] [place] [address] [address]   [facility]   [phone]

## 2018-05-01 NOTE — PROGRESS NOTES
Occupational Therapy Note 956    Spoke with RN. Patient currently receiving bronchoscopy in room. Will follow up this afternoon as appropriate and able.      Kait Chadwick, OT

## 2018-05-01 NOTE — DIABETES MGMT
DTC Progress Note    Recommendations/ Comments: Chart reviewed due to hyperglycemia. Blood sugars now mostly >200   If appropriate, please consider:   - Adding Lantus 12 units     If po intake increases >50% and BG's are still above goal, then may need to consider adding Glimepiride 1 mg with breakfast     Current hospital DM medication: correction scale Humalog, normal sensitivity.      Chart reviewed on Sandy Ledesma.     Patient is a 82 y.o. female with known Type 2 Diabetes on Januvia 50 mg/d; Amaryl 1 mg/d; Metformin 1000 mg BID at home. A1c:   Lab Results   Component Value Date/Time    Hemoglobin A1c 7.0 (H) 04/18/2018 08:14 AM    Hemoglobin A1c 6.9 (H) 11/30/2011 08:30 AM       Recent Glucose Results:   Lab Results   Component Value Date/Time     (H) 05/01/2018 04:07 AM    GLUCPOC 168 (H) 05/01/2018 11:55 AM    GLUCPOC 216 (H) 05/01/2018 08:23 AM    GLUCPOC 166 (H) 04/30/2018 11:05 PM        Lab Results   Component Value Date/Time    Creatinine 1.08 (H) 05/01/2018 04:07 AM     Estimated Creatinine Clearance: 33.9 mL/min (based on Cr of 1.08). Active Orders   Diet    DIET NPO        PO intake:   Patient Vitals for the past 72 hrs:   % Diet Eaten   04/30/18 1300 0 %   04/30/18 0900 10 %   04/29/18 1300 30 %   04/29/18 0800 50 %       Will continue to follow as needed.     Thank you  Mary Meade RD, CDE

## 2018-05-02 ENCOUNTER — APPOINTMENT (OUTPATIENT)
Dept: GENERAL RADIOLOGY | Age: 83
DRG: 853 | End: 2018-05-02
Attending: INTERNAL MEDICINE
Payer: MEDICARE

## 2018-05-02 LAB
ALBUMIN SERPL-MCNC: 1.9 G/DL (ref 3.5–5)
ALBUMIN/GLOB SERPL: 0.4 {RATIO} (ref 1.1–2.2)
ALP SERPL-CCNC: 386 U/L (ref 45–117)
ALT SERPL-CCNC: 19 U/L (ref 12–78)
ANION GAP SERPL CALC-SCNC: 8 MMOL/L (ref 5–15)
AST SERPL-CCNC: 44 U/L (ref 15–37)
BASOPHILS # BLD: 0 K/UL (ref 0–0.1)
BASOPHILS NFR BLD: 0 % (ref 0–1)
BILIRUB SERPL-MCNC: 0.6 MG/DL (ref 0.2–1)
BUN SERPL-MCNC: 30 MG/DL (ref 6–20)
BUN/CREAT SERPL: 30 (ref 12–20)
CALCIUM SERPL-MCNC: 9.1 MG/DL (ref 8.5–10.1)
CHLORIDE SERPL-SCNC: 100 MMOL/L (ref 97–108)
CO2 SERPL-SCNC: 30 MMOL/L (ref 21–32)
CREAT SERPL-MCNC: 1.01 MG/DL (ref 0.55–1.02)
DIFFERENTIAL METHOD BLD: ABNORMAL
EOSINOPHIL # BLD: 0 K/UL (ref 0–0.4)
EOSINOPHIL NFR BLD: 0 % (ref 0–7)
ERYTHROCYTE [DISTWIDTH] IN BLOOD BY AUTOMATED COUNT: 23 % (ref 11.5–14.5)
GLOBULIN SER CALC-MCNC: 4.3 G/DL (ref 2–4)
GLUCOSE BLD STRIP.AUTO-MCNC: 178 MG/DL (ref 65–100)
GLUCOSE BLD STRIP.AUTO-MCNC: 182 MG/DL (ref 65–100)
GLUCOSE BLD STRIP.AUTO-MCNC: 253 MG/DL (ref 65–100)
GLUCOSE BLD STRIP.AUTO-MCNC: 299 MG/DL (ref 65–100)
GLUCOSE SERPL-MCNC: 184 MG/DL (ref 65–100)
HCT VFR BLD AUTO: 30.2 % (ref 35–47)
HGB BLD-MCNC: 9.5 G/DL (ref 11.5–16)
IMM GRANULOCYTES # BLD: 0.2 K/UL (ref 0–0.04)
IMM GRANULOCYTES NFR BLD AUTO: 1 % (ref 0–0.5)
LYMPHOCYTES # BLD: 1.4 K/UL (ref 0.8–3.5)
LYMPHOCYTES NFR BLD: 8 % (ref 12–49)
MAGNESIUM SERPL-MCNC: 1.6 MG/DL (ref 1.6–2.4)
MCH RBC QN AUTO: 28.7 PG (ref 26–34)
MCHC RBC AUTO-ENTMCNC: 31.5 G/DL (ref 30–36.5)
MCV RBC AUTO: 91.2 FL (ref 80–99)
MONOCYTES # BLD: 1.7 K/UL (ref 0–1)
MONOCYTES NFR BLD: 10 % (ref 5–13)
NEUTS SEG # BLD: 14.1 K/UL (ref 1.8–8)
NEUTS SEG NFR BLD: 81 % (ref 32–75)
NRBC # BLD: 0.04 K/UL (ref 0–0.01)
NRBC BLD-RTO: 0.2 PER 100 WBC
PLATELET # BLD AUTO: 180 K/UL (ref 150–400)
PMV BLD AUTO: 11 FL (ref 8.9–12.9)
POTASSIUM SERPL-SCNC: 3.8 MMOL/L (ref 3.5–5.1)
PROT SERPL-MCNC: 6.2 G/DL (ref 6.4–8.2)
RBC # BLD AUTO: 3.31 M/UL (ref 3.8–5.2)
RBC MORPH BLD: ABNORMAL
SERVICE CMNT-IMP: ABNORMAL
SODIUM SERPL-SCNC: 138 MMOL/L (ref 136–145)
WBC # BLD AUTO: 17.4 K/UL (ref 3.6–11)

## 2018-05-02 PROCEDURE — 74011250637 HC RX REV CODE- 250/637: Performed by: HOSPITALIST

## 2018-05-02 PROCEDURE — 74011250637 HC RX REV CODE- 250/637: Performed by: INTERNAL MEDICINE

## 2018-05-02 PROCEDURE — 74011636637 HC RX REV CODE- 636/637: Performed by: HOSPITALIST

## 2018-05-02 PROCEDURE — 80053 COMPREHEN METABOLIC PANEL: CPT | Performed by: HOSPITALIST

## 2018-05-02 PROCEDURE — 97535 SELF CARE MNGMENT TRAINING: CPT

## 2018-05-02 PROCEDURE — 74011250636 HC RX REV CODE- 250/636: Performed by: HOSPITALIST

## 2018-05-02 PROCEDURE — 74011000258 HC RX REV CODE- 258: Performed by: HOSPITALIST

## 2018-05-02 PROCEDURE — 65660000000 HC RM CCU STEPDOWN

## 2018-05-02 PROCEDURE — 74011250636 HC RX REV CODE- 250/636: Performed by: INTERNAL MEDICINE

## 2018-05-02 PROCEDURE — 85025 COMPLETE CBC W/AUTO DIFF WBC: CPT | Performed by: HOSPITALIST

## 2018-05-02 PROCEDURE — 74011250636 HC RX REV CODE- 250/636: Performed by: PHYSICIAN ASSISTANT

## 2018-05-02 PROCEDURE — 94640 AIRWAY INHALATION TREATMENT: CPT

## 2018-05-02 PROCEDURE — 36592 COLLECT BLOOD FROM PICC: CPT

## 2018-05-02 PROCEDURE — 74011000250 HC RX REV CODE- 250: Performed by: INTERNAL MEDICINE

## 2018-05-02 PROCEDURE — 77010033678 HC OXYGEN DAILY

## 2018-05-02 PROCEDURE — 71045 X-RAY EXAM CHEST 1 VIEW: CPT

## 2018-05-02 PROCEDURE — 97110 THERAPEUTIC EXERCISES: CPT

## 2018-05-02 PROCEDURE — 82962 GLUCOSE BLOOD TEST: CPT

## 2018-05-02 PROCEDURE — 83735 ASSAY OF MAGNESIUM: CPT | Performed by: HOSPITALIST

## 2018-05-02 PROCEDURE — 36415 COLL VENOUS BLD VENIPUNCTURE: CPT | Performed by: HOSPITALIST

## 2018-05-02 PROCEDURE — 77030029684 HC NEB SM VOL KT MONA -A

## 2018-05-02 PROCEDURE — 74011250637 HC RX REV CODE- 250/637: Performed by: NURSE PRACTITIONER

## 2018-05-02 RX ORDER — POLYETHYLENE GLYCOL 3350 17 G/17G
17 POWDER, FOR SOLUTION ORAL DAILY
Status: DISCONTINUED | OUTPATIENT
Start: 2018-05-03 | End: 2018-05-02

## 2018-05-02 RX ORDER — FUROSEMIDE 10 MG/ML
20 INJECTION INTRAMUSCULAR; INTRAVENOUS ONCE
Status: COMPLETED | OUTPATIENT
Start: 2018-05-02 | End: 2018-05-02

## 2018-05-02 RX ORDER — POTASSIUM CHLORIDE 29.8 MG/ML
20 INJECTION INTRAVENOUS ONCE
Status: COMPLETED | OUTPATIENT
Start: 2018-05-02 | End: 2018-05-03

## 2018-05-02 RX ORDER — ASCORBIC ACID 500 MG
1000 TABLET ORAL DAILY
Status: DISCONTINUED | OUTPATIENT
Start: 2018-05-03 | End: 2018-05-06

## 2018-05-02 RX ORDER — POLYETHYLENE GLYCOL 3350 17 G/17G
17 POWDER, FOR SOLUTION ORAL DAILY
Status: DISCONTINUED | OUTPATIENT
Start: 2018-05-02 | End: 2018-05-06

## 2018-05-02 RX ORDER — TERIPARATIDE 250 UG/ML
INJECTION, SOLUTION SUBCUTANEOUS
Qty: 2.4 ML | Refills: 0 | OUTPATIENT
Start: 2018-05-02 | End: 2018-05-11

## 2018-05-02 RX ADMIN — THERA TABS 1 TABLET: TAB at 08:02

## 2018-05-02 RX ADMIN — Medication 10 ML: at 05:00

## 2018-05-02 RX ADMIN — CARVEDILOL 6.25 MG: 6.25 TABLET, FILM COATED ORAL at 17:27

## 2018-05-02 RX ADMIN — IPRATROPIUM BROMIDE AND ALBUTEROL SULFATE 3 ML: .5; 3 SOLUTION RESPIRATORY (INHALATION) at 19:35

## 2018-05-02 RX ADMIN — FLUCONAZOLE IN SODIUM CHLORIDE 200 MG: 2 INJECTION, SOLUTION INTRAVENOUS at 11:16

## 2018-05-02 RX ADMIN — Medication 10 ML: at 22:00

## 2018-05-02 RX ADMIN — FUROSEMIDE 20 MG: 10 INJECTION, SOLUTION INTRAMUSCULAR; INTRAVENOUS at 08:25

## 2018-05-02 RX ADMIN — ANASTROZOLE 1 MG: 1 TABLET, COATED ORAL at 08:02

## 2018-05-02 RX ADMIN — MEROPENEM 500 MG: 500 INJECTION, POWDER, FOR SOLUTION INTRAVENOUS at 19:09

## 2018-05-02 RX ADMIN — MEROPENEM 500 MG: 500 INJECTION, POWDER, FOR SOLUTION INTRAVENOUS at 02:33

## 2018-05-02 RX ADMIN — Medication 10 ML: at 14:00

## 2018-05-02 RX ADMIN — IPRATROPIUM BROMIDE AND ALBUTEROL SULFATE 3 ML: .5; 3 SOLUTION RESPIRATORY (INHALATION) at 17:08

## 2018-05-02 RX ADMIN — INSULIN LISPRO 5 UNITS: 100 INJECTION, SOLUTION INTRAVENOUS; SUBCUTANEOUS at 11:15

## 2018-05-02 RX ADMIN — BUDESONIDE 500 MCG: 0.5 INHALANT RESPIRATORY (INHALATION) at 19:35

## 2018-05-02 RX ADMIN — Medication 10 ML: at 13:24

## 2018-05-02 RX ADMIN — FOLIC ACID 1 MG: 1 TABLET ORAL at 08:12

## 2018-05-02 RX ADMIN — HYDRALAZINE HYDROCHLORIDE 25 MG: 25 TABLET ORAL at 15:47

## 2018-05-02 RX ADMIN — ASPIRIN 81 MG 81 MG: 81 TABLET ORAL at 08:01

## 2018-05-02 RX ADMIN — CARVEDILOL 6.25 MG: 6.25 TABLET, FILM COATED ORAL at 08:12

## 2018-05-02 RX ADMIN — Medication 10 ML: at 19:09

## 2018-05-02 RX ADMIN — Medication 400 MG: at 08:02

## 2018-05-02 RX ADMIN — POLYETHYLENE GLYCOL 3350 17 G: 17 POWDER, FOR SOLUTION ORAL at 15:48

## 2018-05-02 RX ADMIN — VITAMIN D, TAB 1000IU (100/BT) 1000 UNITS: 25 TAB at 08:02

## 2018-05-02 RX ADMIN — Medication 1 CAPSULE: at 08:01

## 2018-05-02 RX ADMIN — BUDESONIDE 500 MCG: 0.5 INHALANT RESPIRATORY (INHALATION) at 08:42

## 2018-05-02 RX ADMIN — EPOETIN ALFA 10000 UNITS: 10000 SOLUTION INTRAVENOUS; SUBCUTANEOUS at 13:22

## 2018-05-02 RX ADMIN — INSULIN LISPRO 5 UNITS: 100 INJECTION, SOLUTION INTRAVENOUS; SUBCUTANEOUS at 16:13

## 2018-05-02 RX ADMIN — LEVOTHYROXINE SODIUM 88 MCG: 88 TABLET ORAL at 06:38

## 2018-05-02 RX ADMIN — ACETYLCYSTEINE 200 MG: 200 SOLUTION ORAL; RESPIRATORY (INHALATION) at 17:08

## 2018-05-02 RX ADMIN — LEVOFLOXACIN 750 MG: 5 INJECTION, SOLUTION INTRAVENOUS at 15:47

## 2018-05-02 RX ADMIN — MEROPENEM 500 MG: 500 INJECTION, POWDER, FOR SOLUTION INTRAVENOUS at 10:18

## 2018-05-02 RX ADMIN — ACETYLCYSTEINE 200 MG: 200 SOLUTION ORAL; RESPIRATORY (INHALATION) at 12:25

## 2018-05-02 RX ADMIN — IPRATROPIUM BROMIDE AND ALBUTEROL SULFATE 3 ML: .5; 3 SOLUTION RESPIRATORY (INHALATION) at 08:42

## 2018-05-02 RX ADMIN — ACETYLCYSTEINE 200 MG: 200 SOLUTION ORAL; RESPIRATORY (INHALATION) at 19:35

## 2018-05-02 RX ADMIN — IPRATROPIUM BROMIDE AND ALBUTEROL SULFATE 3 ML: .5; 3 SOLUTION RESPIRATORY (INHALATION) at 03:44

## 2018-05-02 RX ADMIN — OXYCODONE HYDROCHLORIDE AND ACETAMINOPHEN 500 MG: 500 TABLET ORAL at 08:01

## 2018-05-02 RX ADMIN — SPIRONOLACTONE 25 MG: 25 TABLET, FILM COATED ORAL at 08:02

## 2018-05-02 RX ADMIN — ACETYLCYSTEINE 200 MG: 200 SOLUTION ORAL; RESPIRATORY (INHALATION) at 08:42

## 2018-05-02 RX ADMIN — POTASSIUM CHLORIDE 20 MEQ: 400 INJECTION, SOLUTION INTRAVENOUS at 03:58

## 2018-05-02 RX ADMIN — HYDRALAZINE HYDROCHLORIDE 25 MG: 25 TABLET ORAL at 08:12

## 2018-05-02 RX ADMIN — IPRATROPIUM BROMIDE AND ALBUTEROL SULFATE 3 ML: .5; 3 SOLUTION RESPIRATORY (INHALATION) at 12:25

## 2018-05-02 RX ADMIN — HYDRALAZINE HYDROCHLORIDE 25 MG: 25 TABLET ORAL at 22:06

## 2018-05-02 RX ADMIN — INSULIN LISPRO 2 UNITS: 100 INJECTION, SOLUTION INTRAVENOUS; SUBCUTANEOUS at 06:38

## 2018-05-02 NOTE — PROGRESS NOTES
-Hematology / Oncology ([facility]) -  -Primary Oncologist-  Dr Brittny Chung  -CC- breast cancer    -S-  No new complaints    -O-    Patient Vitals for the past 24 hrs:   Temp Pulse Resp BP SpO2   05/02/18 0800 99.1 °F (37.3 °C) - - - -   05/02/18 0700 - (!) 102 28 154/67 99 %   05/02/18 0600 - 99 27 136/55 96 %   05/02/18 0500 - 94 23 125/52 97 %   05/02/18 0400 98.5 °F (36.9 °C) 98 28 134/56 97 %   05/02/18 0344 - - - - 95 %   05/02/18 0300 - 97 26 132/59 97 %   05/02/18 0200 - 90 23 119/51 97 %   05/02/18 0100 - 91 22 114/49 95 %   05/02/18 0000 99.7 °F (37.6 °C) 98 28 132/53 96 %   05/01/18 2319 - - - - 97 %   05/01/18 2300 - 96 28 131/55 98 %   05/01/18 2200 - 92 24 108/43 94 %   05/01/18 2100 - 93 25 116/45 98 %   05/01/18 2052 98.5 °F (36.9 °C) - - - -   05/01/18 2000 100.2 °F (37.9 °C) 91 20 107/41 98 %   05/01/18 1900 - 94 24 104/43 97 %   05/01/18 1800 - 93 23 108/42 95 %   05/01/18 1704 - - - - 96 %   05/01/18 1700 - 89 24 105/42 96 %   05/01/18 1630 - 87 25 110/45 98 %   05/01/18 1600 98.7 °F (37.1 °C) 83 22 114/49 100 %   05/01/18 1530 - 82 21 111/51 100 %   05/01/18 1518 - 85 19 120/51 99 %   05/01/18 1447 98.8 °F (37.1 °C) 92 25 130/56 -   05/01/18 1330 - 96 26 133/55 99 %   05/01/18 1300 - 87 21 123/52 99 %   05/01/18 1230 - 87 23 114/49 99 %   05/01/18 1205 - 88 22 124/52 99 %   05/01/18 1200 (!) 100.8 °F (38.2 °C) 93 26 127/54 97 %   05/01/18 1140 - 99 23 131/57 98 %   05/01/18 1135 - 99 26 142/56 97 %   05/01/18 1130 - (!) 102 27 142/78 99 %   05/01/18 1125 - 89 23 131/55 99 %   05/01/18 1120 - 97 28 153/65 99 %   05/01/18 1100 - 98 23 156/73 98 %   05/01/18 1000 - 98 28 155/67 98 %   05/01/18 0900 - 100 28 147/62 97 %        Gen: nad  Chest: bilateral breath sounds wheezy  Cardiac: rrr  Abd: s/nt    -Labs-    Recent Labs      05/02/18   0241  05/01/18   0407  04/30/18   1046  04/29/18   0904   WBC  17.4*  21.1*  19.3*   --    HGB  9.5*  10.1*  9.3*   --    PLT  180  187  194   --    ANEU  14.1*  18.8*  15.9* --    NA  138  138  139  146*   K  3.8  4.2  4.6  3.3*   GLU  184*  225*  237*  209*   BUN  30*  34*  39*  43*   CREA  1.01  1.08*  1.21*  1.39*   ALT  19  26   --    --    SGOT  44*  75*   --    --    TBILI  0.6  0.6   --    --    AP  386*  484*   --    --    CA  9.1  9.1  8.7  8.5   MG  1.6   --   1.8   --    PHOS   --    --   2.6  2.6       -Assessment + Plan-     *) metastatic breast cancer. ER+, HI+, her-2 neg; bone scan: diffuse mets  ----- continue  Arimidex (hormonal AI therapy) started on 4/24  *) Anemia: suspect AOCD +- marrow involvement,  started procrit weekly on 4/23  ---- prbc x1 4/23, 4/26 . Stable  *) CHF. Frutoso Golder Per cardiology. .  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  Required bronchoscopy to clear secretions again 5/1

## 2018-05-02 NOTE — DIABETES MGMT
DTC Progress Note    Recommendations/ Comments: Chart reviewed due to hyperglycemia. Blood sugars now mostly >180   If appropriate, please consider:   - Adding Lantus 12 units     If po intake increases >50% and BG's are still above goal, then may need to consider adding Glimepiride 1 mg with breakfast     Current hospital DM medication: correction scale Humalog, normal sensitivity.      Chart reviewed on Sandy Ledesma.     Patient is a 82 y.o. female with known Type 2 Diabetes on Januvia 50 mg/d; Amaryl 1 mg/d; Metformin 1000 mg BID at home. A1c:   Lab Results   Component Value Date/Time    Hemoglobin A1c 7.0 (H) 04/18/2018 08:14 AM    Hemoglobin A1c 6.9 (H) 11/30/2011 08:30 AM       Recent Glucose Results:   Lab Results   Component Value Date/Time     (H) 05/02/2018 02:41 AM    GLUCPOC 299 (H) 05/02/2018 11:12 AM    GLUCPOC 178 (H) 05/02/2018 06:27 AM    GLUCPOC 215 (H) 05/01/2018 10:03 PM        Lab Results   Component Value Date/Time    Creatinine 1.01 05/02/2018 02:41 AM     Estimated Creatinine Clearance: 33 mL/min (based on Cr of 1.01). Active Orders   Diet    DIET CARDIAC Regular; 2 GM NA (House Low NA)        PO intake:   Patient Vitals for the past 72 hrs:   % Diet Eaten   05/02/18 0800 40 %   05/01/18 1800 25 %   04/30/18 1300 0 %   04/30/18 0900 10 %       Will continue to follow as needed.     Thank you  Reji Meza RD, CDE

## 2018-05-02 NOTE — PROGRESS NOTES
0730-Bedside and Verbal shift change report given to [address] (oncoming nurse) by Alesha Moreno RN  (offgoing nurse). Report included the following information SBAR, Kardex, ED Summary, OR Summary, Procedure Summary, Intake/Output, MAR and Recent Results.

## 2018-05-02 NOTE — PROGRESS NOTES
Patient remains in ICU, intermittently on Bipap. Per MD noter bone scan shows diffuse mets. Patient is s/p a bronch from yesterday. Palliative is following. Care management continuing to follow for potential discharge planning needs.  Latrell Segura RN,CRM

## 2018-05-02 NOTE — PROGRESS NOTES
ID Progress Note  2018    Subjective:     She remains weak. She has needed bipap intermittently. She has no specific complaints. Bronchoscopy earlier today--sleeping. Objective:     Antibiotics:  1. Merrem  2. Levaquin  3. Fluconazole      Vitals:   Visit Vitals    /53 (BP 1 Location: Left arm)  Comment (BP Patient Position): Bed/chair position    Pulse 96    Temp 97.4 °F (36.3 °C)    Resp 25    Ht 5' 1\" (1.549 m)    Wt 57.1 kg (125 lb 14.1 oz)    SpO2 97%    BMI 23.79 kg/m2        Tmax:  Temp (24hrs), Av.9 °F (37.2 °C), Min:97.4 °F (36.3 °C), Max:100.2 °F (37.9 °C)      Exam:  Lungs:  rhonchi R anterior  Heart:  regular rate and rhythm  Abdomen:  soft, non-tender. Bowel sounds normal. No masses,  no organomegaly  Skin:  no rash or abnormalities    Labs:      Recent Labs      18   0241  18   0407  18   1046   WBC  17.4*  21.1*  19.3*   HGB  9.5*  10.1*  9.3*   PLT  180  187  194   BUN  30*  34*  39*   CREA  1.01  1.08*  1.21*   SGOT  44*  75*   --    AP  386*  484*   --    TBILI  0.6  0.6   --        Cultures:     No results found for: [facility]  Lab Results   Component Value Date/Time    Culture result: LIGHT YEAST (A) 2018 11:30 AM    Culture result: FEW APPARENT NORMAL RESPIRATORY BERYL 2018 11:30 AM    Culture result: LIGHT APPARENT CANDIDA ALBICANS (A) 2018 09:10 AM    Culture result: NO NORMAL RESPIRATORY BERYL ISOLATED 2018 09:10 AM       Radiology: X ray stable    Line/Insert Date:           Assessment:     1. Pneumonia  2. Metastatic cancer  3. Debility  4. Candida from sputum (uncertain significance)  5. Leukocytosis--WBC up and down    Objective:     1. Continue current therapy  2. Follow up pending cultures and studies  3.  D/W son at bedside    Ava Dooley MD

## 2018-05-02 NOTE — PROGRESS NOTES
NUTRITION COMPLETE ASSESSMENT    RECOMMENDATIONS:   Encourage oral intake; consume supplements between meals and/or with medications     Lantus insulin per DTC (12 units)  Interventions/Plan:   Food/Nutrient Delivery:    Commercial supplements          Assessment:   Reason for Assessment:    [x]Reassessment     Diet: Cardiac 2 GM NA   Supplements: none  Nutritionally Significant Medications: [x] Reviewed & Includes: Vit C, Vit D3, EPO, Omega 3 fatty acids, FA, Lasix x 1   Meal Intake: Patient Vitals for the past 100 hrs:   % Diet Eaten   05/02/18 0800 40 %   05/01/18 1800 25 %   04/30/18 1300 0 %   04/30/18 0900 10 %   04/29/18 1300 30 %   04/29/18 0800 50 %       Subjective:  Likes the Ensure. Son reports pt is not eating enough; they are providing snacks (mainly HS) for patient. Objective:  Ms Anahy Szymanski was admitted with SOB. Noted: acute hypercarbic, hypoxic respiratory failure-improved, off BiPAP/tolerating NC; newly diagnosed metastatic breast cancer with mets to bone; acute systolic CHF, JEROD on CKD 3-improved; NSTEMI. PMHx: DM, Colon cancer, RA, CVA, others noted. Ms Anahy Szymanski looks good this afternoon; awaiting transfer out of the ICU. PO intake appears better today. Will order Magic cup and Ensure Compact supplements once daily to concentrate po intake. Son also interested in selecting menu for patient-discussed process in ICU and on stepdown unit. Will send menu with dinner tray to select for tomorrow. Expect po to increase as pt improves. Weight down 3 kg in the past week d/t volume removal. BG elevated-agree with DTC to add basal insulin. Last BM one week ago; Miralax ordered to start tomorrow. Estimated Nutrition Needs:   Kcals/day: 1540 Kcals/day (MSJ x 1.4)  Protein: 68 g (1.2g/kg)  Fluid:  (1 ml/kcal)  Based On: [place]  Weight Used: Actual wt (57 kg)    Pt expected to meet estimated nutrient needs:  []   Yes     []  No  [x] Unable to predict at this time    Nutrition Diagnosis:   1. Inadequate oral intake related to acute respiratory failure as evidenced by 0-50% intake of meals. Goals:     Consume at least 70% of meals with 1-2 supplements per day. Monitoring & Evaluation:    - Total energy intake, Protein intake   - Weight/weight change    Previous Nutrition Goals Met:  N/A  Previous Recommendations:      N/A    Education & Discharge Needs:   [x] None Identified   [] Identified and addressed    [x] Participated in care plan, discharge planning, and/or interdisciplinary rounds        Cultural, Zoroastrianism and ethnic food preferences identified:   None    Skin Integrity: []Intact  [x] see WOCN documentation  Edema: []None [x] Trace BUE's  Last BM: 4/25  Food Allergies: [x]None []Other    Anthropometrics:    Weight Loss Metrics 5/2/2018 4/16/2018 4/3/2018 3/19/2018 2/9/2018 2/9/2018 12/20/2017   Today's Wt 125 lb 14.1 oz - 124 lb 129 lb 132 lb 127 lb 132 lb   BMI - 23.79 kg/m2 23.43 kg/m2 24.37 kg/m2 24.94 kg/m2 24 kg/m2 24.94 kg/m2      Last 3 Recorded Weights in this Encounter    05/01/18 0500 05/02/18 0500 05/02/18 1025   Weight: 59.9 kg (132 lb 0.9 oz) 57.1 kg (125 lb 14.1 oz) 57.1 kg (125 lb 14.1 oz)      Weight Source: Bed  Height: 5' 1\" (154.9 cm),    Body mass index is 23.79 kg/(m^2).   IBW : 47.6 kg (105 lb), % IBW (Calculated): 119.89 %   ,      Labs:  Lab Results   Component Value Date/Time    Sodium 138 05/02/2018 02:41 AM    Potassium 3.8 05/02/2018 02:41 AM    Chloride 100 05/02/2018 02:41 AM    CO2 30 05/02/2018 02:41 AM    Glucose 184 (H) 05/02/2018 02:41 AM    BUN 30 (H) 05/02/2018 02:41 AM    Creatinine 1.01 05/02/2018 02:41 AM    Calcium 9.1 05/02/2018 02:41 AM    Magnesium 1.6 05/02/2018 02:41 AM    Phosphorus 2.6 04/30/2018 10:46 AM    Albumin 1.9 (L) 05/02/2018 02:41 AM     Lab Results   Component Value Date/Time    Hemoglobin A1c 7.0 (H) 04/18/2018 08:14 AM     Lab Results   Component Value Date/Time    Glucose (POC) 299 (H) 05/02/2018 11:12 AM      Lab Results Component Value Date/Time    ALT (SGPT) 19 05/02/2018 02:41 AM    AST (SGOT) 44 (H) 05/02/2018 02:41 AM    Alk.  phosphatase 386 (H) 05/02/2018 02:41 AM    Bilirubin, direct 0.2 05/01/2018 04:07 AM    Bilirubin, total 0.6 05/02/2018 02:41 AM        Brandon Fair RD CNSC

## 2018-05-02 NOTE — PROGRESS NOTES
Cardiology Progress Note            Admit Date: 4/16/2018  Admit Diagnosis: SOB (shortness of breath)  colon  Anemia  COLON  Date: 5/2/2018     Time: 2:01 PM    Subjective: Off biPAP overnight. Still with some SOB. Denies dysphagia but states she has weak cough, difficulty expectorating. Denies chest pain. Assessment and Plan     1. NSTEMI:     -No chest pain. Medical mgmt as able given other issues. -Repeat TTE:4/27/18  EF unchanged 45% with mild hypokinesis  basal-mid anteroseptal and apical walls.   -Held ASA and Plavix on 4/26 d/t worsened anemia. ASA restarted. Continue off Plavix   -Continue coreg 6.25 mg BID   -Statin intolerant due to weakness on zocor, on repatha, last LDL 31, so will not rechallenge statin. On fish-oil. 2. Cardiomyopathy/Acute HFrEF, new:  EF 45% NYHA IV     -Currently holding ace-I/arb due to elevated creatinine, but creatinine trending down. -Increase Coreg to 6.25 mg BID today.   -Daily weights, I/Os (net -458 mls/24 hours). -Continue aldactone. Lasix 20 mg IV x 1 (already ordered)   -CXR pending        3. JEROD on CKD:   Creatinine improving 1.01. GFR 53   -Nephrology following. 4. Acute hypoxic respiratory failure:multifactorial.  On 2 L NC. today   -difficulty managing secretions   -Aldactone, diuretics intermittent   -Pulmonary & ID following (PNA). -CXR pending    5. Anemia:hgb 10.1 On procrit. 7. HTN- BP labile.   -Coreg to 6.25 mg BID (was increased 5/1 PM)   -Hydralazine 25 mg TID    8. Leukocytosis/Ecoli UTI/PNA     9. Hx of Colon cancer, now metastatic breast ca (bone, liver,calvarium mets) on Arimedex  -Hematology oncology following. 10. Advanced directives:  No shock or CPR, DNR Palliative care following. 11. Deconditioned:  Very weak. Dr. Fontenot Side to see pt today. Ciarra Wilson. [place] 0900    Cardiology Attending:Patient seen and examined.   I agree with NP assessment and plans. More alert, seems to have recurrent plugging of bronchi. Annie Pak MD 5/2/2018 12:38 PM         Objective:      Physical Exam:                Visit Vitals    /67    Pulse (!) 102    Temp 99.1 °F (37.3 °C)    Resp 28    Ht 5' 1\" (1.549 m)    Wt 57.1 kg (125 lb 14.1 oz)    SpO2 99%    BMI 23.79 kg/m2          General Appearance:   elderly female, NAD, on Bipap   Ears/Nose/Mouth/Throat:    Hearing grossly normal.         Neck:  Supple. Chest:     Course rhonchi bilaterally. On 2 L NC, O2 sat 98%. Cardiovascular:   Regular rate and rhythm, S1, S2 normal, no murmur   Abdomen:    Soft, nontender, no gaurding. Extremities:  No edema bilaterally. Skin:  Warm and dry.      Telemetry: Sinus rhythm- sinus tachycardia          Data Review:    Labs:    Recent Results (from the past 24 hour(s))   CULTURE, RESPIRATORY/SPUTUM/BRONCH W GRAM STAIN    Collection Time: 05/01/18 11:30 AM   Result Value Ref Range    Special Requests: NO SPECIAL REQUESTS      GRAM STAIN 1+ WBCS SEEN      GRAM STAIN OCCASIONAL EPITHELIAL CELLS SEEN      GRAM STAIN NO ORGANISMS SEEN      Culture result: PENDING    GLUCOSE, POC    Collection Time: 05/01/18 11:55 AM   Result Value Ref Range    Glucose (POC) 168 (H) 65 - 100 mg/dL    Performed by Milagro Webb    GLUCOSE, POC    Collection Time: 05/01/18  4:27 PM   Result Value Ref Range    Glucose (POC) 119 (H) 65 - 100 mg/dL    Performed by Milagro Webb    GLUCOSE, POC    Collection Time: 05/01/18 10:03 PM   Result Value Ref Range    Glucose (POC) 215 (H) 65 - 100 mg/dL    Performed by Leslye Reynolds    CBC WITH AUTOMATED DIFF    Collection Time: 05/02/18  2:41 AM   Result Value Ref Range    WBC 17.4 (H) 3.6 - 11.0 K/uL    RBC 3.31 (L) 3.80 - 5.20 M/uL    HGB 9.5 (L) 11.5 - 16.0 g/dL    HCT 30.2 (L) 35.0 - 47.0 %    MCV 91.2 80.0 - 99.0 FL    MCH 28.7 26.0 - 34.0 PG    MCHC 31.5 30.0 - 36.5 g/dL    RDW 23.0 (H) 11.5 - 14.5 %    PLATELET 172 114 - 400 K/uL    MPV 11.0 8.9 - 12.9 FL    NRBC 0.2 (H) 0  WBC    ABSOLUTE NRBC 0.04 (H) 0.00 - 0.01 K/uL    NEUTROPHILS 81 (H) 32 - 75 %    LYMPHOCYTES 8 (L) 12 - 49 %    MONOCYTES 10 5 - 13 %    EOSINOPHILS 0 0 - 7 %    BASOPHILS 0 0 - 1 %    IMMATURE GRANULOCYTES 1 (H) 0.0 - 0.5 %    ABS. NEUTROPHILS 14.1 (H) 1.8 - 8.0 K/UL    ABS. LYMPHOCYTES 1.4 0.8 - 3.5 K/UL    ABS. MONOCYTES 1.7 (H) 0.0 - 1.0 K/UL    ABS. EOSINOPHILS 0.0 0.0 - 0.4 K/UL    ABS. BASOPHILS 0.0 0.0 - 0.1 K/UL    ABS. IMM. GRANS. 0.2 (H) 0.00 - 0.04 K/UL    DF MANUAL      RBC COMMENTS OVALOCYTES  PRESENT        RBC COMMENTS TARGET CELLS  PRESENT        RBC COMMENTS POLYCHROMASIA  1+        RBC COMMENTS ANISOCYTOSIS  2+       METABOLIC PANEL, COMPREHENSIVE    Collection Time: 05/02/18  2:41 AM   Result Value Ref Range    Sodium 138 136 - 145 mmol/L    Potassium 3.8 3.5 - 5.1 mmol/L    Chloride 100 97 - 108 mmol/L    CO2 30 21 - 32 mmol/L    Anion gap 8 5 - 15 mmol/L    Glucose 184 (H) 65 - 100 mg/dL    BUN 30 (H) 6 - 20 MG/DL    Creatinine 1.01 0.55 - 1.02 MG/DL    BUN/Creatinine ratio 30 (H) 12 - 20      GFR est AA >60 >60 ml/min/1.73m2    GFR est non-AA 53 (L) >60 ml/min/1.73m2    Calcium 9.1 8.5 - 10.1 MG/DL    Bilirubin, total 0.6 0.2 - 1.0 MG/DL    ALT (SGPT) 19 12 - 78 U/L    AST (SGOT) 44 (H) 15 - 37 U/L    Alk.  phosphatase 386 (H) 45 - 117 U/L    Protein, total 6.2 (L) 6.4 - 8.2 g/dL    Albumin 1.9 (L) 3.5 - 5.0 g/dL    Globulin 4.3 (H) 2.0 - 4.0 g/dL    A-G Ratio 0.4 (L) 1.1 - 2.2     MAGNESIUM    Collection Time: 05/02/18  2:41 AM   Result Value Ref Range    Magnesium 1.6 1.6 - 2.4 mg/dL   GLUCOSE, POC    Collection Time: 05/02/18  6:27 AM   Result Value Ref Range    Glucose (POC) 178 (H) 65 - 100 mg/dL    Performed by Criss Patel           Radiology:        Current Facility-Administered Medications   Medication Dose Route Frequency    naloxone (NARCAN) injection 0.4 mg  0.4 mg IntraVENous Multiple    flumazenil (ROMAZICON) 0.1 mg/mL injection 0.2 mg  0.2 mg IntraVENous Multiple    midazolam (VERSED) injection 0.25-5 mg  0.25-5 mg IntraVENous Multiple    carvedilol (COREG) tablet 6.25 mg  6.25 mg Oral BID WITH MEALS    meropenem (MERREM) 500 mg in 0.9% sodium chloride (MBP/ADV) 50 mL  0.5 g IntraVENous Q8H    fluconazole (DIFLUCAN) 200mg/100 mL IVPB (premix)  200 mg IntraVENous Q24H    aspirin chewable tablet 81 mg  81 mg Oral DAILY    spironolactone (ALDACTONE) tablet 25 mg  25 mg Oral DAILY    acetaminophen (TYLENOL) suppository 650 mg  650 mg Rectal Q4H PRN    hydrALAZINE (APRESOLINE) tablet 25 mg  25 mg Oral TID    levoFLOXacin (LEVAQUIN) 750 mg in D5W IVPB  750 mg IntraVENous Q48H    sodium chloride (NS) flush 5-10 mL  5-10 mL IntraVENous Q8H    sodium chloride (NS) flush 5-10 mL  5-10 mL IntraVENous PRN    albuterol-ipratropium (DUO-NEB) 2.5 MG-0.5 MG/3 ML  3 mL Nebulization Q4H RT    budesonide (PULMICORT) 500 mcg/2 ml nebulizer suspension  500 mcg Nebulization BID RT    acetylcysteine (MUCOMYST) 200 mg/mL (20 %) solution 200 mg  200 mg Nebulization QID RT    hydrALAZINE (APRESOLINE) 20 mg/mL injection 10 mg  10 mg IntraVENous Q6H PRN    0.9% sodium chloride infusion 250 mL  250 mL IntraVENous PRN    sodium chloride (NS) flush 20 mL  20 mL InterCATHeter PRN    sodium chloride (NS) flush 10 mL  10 mL InterCATHeter Q24H    sodium chloride (NS) flush 10 mL  10 mL InterCATHeter PRN    sodium chloride (NS) flush 10 mL  10 mL InterCATHeter Q8H    alteplase (CATHFLO) 1 mg in sterile water (preservative free) 1 mL injection  1 mg InterCATHeter PRN    bacitracin 500 unit/gram packet 1 Packet  1 Packet Topical PRN    sodium chloride (NS) flush 10-30 mL  10-30 mL InterCATHeter PRN    sodium chloride (NS) flush 10 mL  10 mL InterCATHeter Q24H    sodium chloride (NS) flush 10 mL  10 mL InterCATHeter PRN    sodium chloride (NS) flush 10-40 mL  10-40 mL InterCATHeter Q8H    sodium chloride (NS) flush 20 mL  20 mL InterCATHeter Q24H    anastrozole (ARIMIDEX) tablet 1 mg  1 mg Oral DAILY    0.9% sodium chloride infusion 250 mL  250 mL IntraVENous PRN    epoetin celio (EPOGEN;PROCRIT) injection 10,000 Units  10,000 Units SubCUTAneous Q MON, WED & FRI    albuterol-ipratropium (DUO-NEB) 2.5 MG-0.5 MG/3 ML  3 mL Nebulization Q6H PRN    glucose chewable tablet 16 g  4 Tab Oral PRN    dextrose (D50W) injection syrg 12.5-25 g  12.5-25 g IntraVENous PRN    glucagon (GLUCAGEN) injection 1 mg  1 mg IntraMUSCular PRN    insulin lispro (HUMALOG) injection   SubCUTAneous AC&HS    prochlorperazine (COMPAZINE) with saline injection 5 mg  5 mg IntraVENous Q8H PRN    ascorbic acid (vitamin C) (VITAMIN C) tablet 500 mg  500 mg Oral DAILY    cholecalciferol (VITAMIN D3) tablet 1,000 Units  1,000 Units Oral DAILY    folic acid (FOLVITE) tablet 1 mg  1 mg Oral DAILY    magnesium oxide (MAG-OX) tablet 400 mg  400 mg Oral DAILY    therapeutic multivitamin (THERAGRAN) tablet 1 Tab  1 Tab Oral DAILY    fish oil-omega-3 fatty acids 340-1,000 mg capsule 1 Cap  1 Cap Oral DAILY    sodium chloride (NS) flush 5-10 mL  5-10 mL IntraVENous Q8H    sodium chloride (NS) flush 5-10 mL  5-10 mL IntraVENous PRN    ondansetron (ZOFRAN) injection 4 mg  4 mg IntraVENous Q4H PRN    levothyroxine (SYNTHROID) tablet 88 mcg  88 mcg Oral ACB     Pt critically ill, poor prognosis, multiple severe issues. Shavonne Richards.  [place]     [facility] [address] [place] [address] [address]   Radha [place]   [phone]

## 2018-05-02 NOTE — PROGRESS NOTES
Hospitalist Progress Note  Anh Christensen MD  Answering service: [phone] OR [phone] from in house phone        Date of Service:  2018  NAME:  Sumit Diaz  :  1935  MRN:  [id number]      Admission Summary:   The patient is an 65-year-old female with history of diabetes and rheumatoid arthritis who initially presented to the emergency room via EMS with complaints of shortness of breath    Interval history / Subjective:   Ms Manuel Crawford feeling better,she did not sleep well last night. Assessment & Plan:     Acute on chronic hypoxic/hypercarbic respiratory failure due to pulmonary edema, pneumonia  -s/p bronchoscopy on  and on  suctioned and cleared  -respiratory culture grow on  and  grow candida albicans, on diflucan  -,resp distress and CXR worsening worsening of near complete left hemithorax opacification.  -On and off BIPAP  -S/p bronch and clearing of secretion on   -CXR ordered this monring,pending    Acute encephalopathy possible due to metabolic. Resolved  -CT head on  volume loss and minimal white matter disease. No acute intracranial [place], Sinus mucosal inflammatory change  -MRI of brain  Study limited by motion artifact, volume loss and white matter disease. No definite intracranial metastasis however no intravenous contrast was administered due to poor renal function. 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     Sepsis secondary to E Coli UTI, pneumonia  POA  -on zosyn, vancomycin, meropenem, and fluconazole   -urine cx   grow e coli, pseudomanas sp  -blood cx on ,  and  no growth    NSTEMI  -on fish oil  -restarted on aspirin on ,  plavix held due to low Hgb  -IV metoprolol is added on , switched to po coreg  -denied chest pain  -cardiologist on board    Acute systolic CHF NYHA Class IV  -bumex is stopped, on hydralazine, spironolactone started , coreg on 4/30  -not on ACEi/ ARB due to renal insufficiency   -monitor I/O and daily weight    JEROD on CKD stage III,creatinien improved  -creatinine improving  -bumex is stopped on 4/28  -monitor renal function, urine output  -nephrologist on board    Possible metastatic left breast cancer  -on armidex   -Hem/Onc on board    HTN  -BP stablecon hydralazine, coreg and spironolactone,  monitor BP    DM-II  -continue insulin sliding scale, monitor finger stick glucose    Hypothyroidism  -continue synthroid    Hx of GERD  -continue pepcid    Elevated liver enzyme   -possible related to liver lesion, monitor LFT    Anemia multifactorial  -Hgb 6.7, s/p 2 units pRBC on 4/26,   -Hgb 9.3, monitor H/H    Hx of colon cancer 25 years ago  -according to son, there is a work-up planned with [facility] regarding the possibility of recurrent CA (based on lesions seen in the calvarium on MRI - 3/19). -hem/onc on board    H/o stroke with residual weakness on the left    Diet,suspect silent aspiration. Speech recommended:  --Continue regular/thin liquid diet, with son cutting up solids as needed (patient's baseline due to dentition)  --NO STRAWS  --Upright for all PO intake  --MBS showed no evidence of  aspiration      Transfer to IMCU      Code status: DNR  DVT prophylaxis:SCD    Care Plan discussed with: Patient/Family and Nurse  Disposition:in ICU    5/1,updated son at bedside and questions answered  Contact son, Jeff Smith . Hospital Problems  Date Reviewed: 4/16/2018          Codes Class Noted POA    Acute systolic heart failure ([facility] [address] ICD-10-CM: I50.21  ICD-9-CM: 428.21  4/24/2018 Unknown        Altered mental status, unspecified ICD-10-CM: R41.82  ICD-9-CM: 780.97  4/23/2018 Unknown        * (Principal)SOB (shortness of breath) ICD-10-CM: R06.02  ICD-9-CM: 786.05  4/16/2018 Unknown                Vital Signs:    Last 24hrs VS reviewed since prior progress note.  Most recent are:  Visit Vitals    /67    Pulse (!) 102    Temp 99.1 °F (37.3 °C)    Resp 28    Ht 5' 1\" (1.549 m)    Wt 57.1 kg (125 lb 14.1 oz)    SpO2 99%    BMI 23.79 kg/m2         Intake/Output Summary (Last 24 hours) at 05/02/18 0908  Last data filed at 05/02/18 0400   Gross per 24 hour   Intake           314.25 ml   Output              650 ml   Net          -335.75 ml        Physical Examination:             Constitutional:  Alert and conversant,on nasal canula   ENT:  Oral mucous moist, oropharynx benign. Neck supple,    Resp:  Decrease bronchial breath sound, few wheezing and rhonic bilaterally. No accessory muscle use   CV:  Regular rhythm, normal rate, no murmurs, gallops, rubs    GI:  Soft, non distended, non tender. normoactive bowel sounds, no hepatosplenomegaly     Musculoskeletal:  No edema    Neurologic:  Conscious and alert, oriented to place and person, answer questions, moves all extremities. Left side weak     Skin:  Good turgor, no rashes or ulcers       Data Review:    Review and/or order of clinical lab test      Labs:     Recent Labs      05/02/18 0241 05/01/18 0407   WBC  17.4*  21.1*   HGB  9.5*  10.1*   HCT  30.2*  32.0*   PLT  180  187     Recent Labs      05/02/18 0241 05/01/18 0407 04/30/18   1046   NA  138  138  139   K  3.8  4.2  4.6   CL  100  100  102   CO2  30  31  31   BUN  30*  34*  39*   CREA  1.01  1.08*  1.21*   GLU  184*  225*  237*   CA  9.1  9.1  8.7   MG  1.6   --   1.8   PHOS   --    --   2.6     Recent Labs      05/02/18 0241 05/01/18 0407 04/30/18   1046   SGOT  44*  75*   --    ALT  19  26   --    AP  386*  484*   --    TBILI  0.6  0.6   --    TP  6.2*  6.7   --    ALB  1.9*  2.2*  2.3*   GLOB  4.3*  4.5*   --      No results for input(s): INR, PTP, APTT in the last 72 hours. No lab exists for component: INREXT, INREXT   No results for input(s): FE, TIBC, PSAT, FERR in the last 72 hours. No results found for: FOL, RBCF   No results for input(s): PH, PCO2, PO2 in the last 72 hours.   No results for input(s): CPK, CKNDX, TROIQ in the last 72 hours.     No lab exists for component: CPKMB  Lab Results   Component Value Date/Time    Cholesterol, total 74 04/16/2018 04:21 AM    HDL Cholesterol 25 04/16/2018 04:21 AM    LDL, calculated 31.6 04/16/2018 04:21 AM    Triglyceride 87 04/16/2018 04:21 AM    CHOL/HDL Ratio 3.0 04/16/2018 04:21 AM     Lab Results   Component Value Date/Time    Glucose (POC) 178 (H) 05/02/2018 06:27 AM    Glucose (POC) 215 (H) 05/01/2018 10:03 PM    Glucose (POC) 119 (H) 05/01/2018 04:27 PM    Glucose (POC) 168 (H) 05/01/2018 11:55 AM    Glucose (POC) 216 (H) 05/01/2018 08:23 AM     Lab Results   Component Value Date/Time    Color YELLOW/STRAW 04/20/2018 04:30 AM    Appearance TURBID (A) 04/20/2018 04:30 AM    Specific gravity 1.029 04/20/2018 04:30 AM    pH (UA) 5.0 04/20/2018 04:30 AM    Protein 100 (A) 04/20/2018 04:30 AM    Glucose NEGATIVE  04/20/2018 04:30 AM    Ketone NEGATIVE  04/20/2018 04:30 AM    Bilirubin NEGATIVE  04/20/2018 04:30 AM    Urobilinogen 0.2 04/20/2018 04:30 AM    Nitrites NEGATIVE  04/20/2018 04:30 AM    Leukocyte Esterase LARGE (A) 04/20/2018 04:30 AM    Epithelial cells FEW 04/20/2018 04:30 AM    Bacteria 3+ (A) 04/20/2018 04:30 AM    WBC >100 (H) 04/20/2018 04:30 AM    RBC 5-10 04/20/2018 04:30 AM         Medications Reviewed:     Current Facility-Administered Medications   Medication Dose Route Frequency    naloxone (NARCAN) injection 0.4 mg  0.4 mg IntraVENous Multiple    flumazenil (ROMAZICON) 0.1 mg/mL injection 0.2 mg  0.2 mg IntraVENous Multiple    midazolam (VERSED) injection 0.25-5 mg  0.25-5 mg IntraVENous Multiple    carvedilol (COREG) tablet 6.25 mg  6.25 mg Oral BID WITH MEALS    meropenem (MERREM) 500 mg in 0.9% sodium chloride (MBP/ADV) 50 mL  0.5 g IntraVENous Q8H    fluconazole (DIFLUCAN) 200mg/100 mL IVPB (premix)  200 mg IntraVENous Q24H    aspirin chewable tablet 81 mg  81 mg Oral DAILY    spironolactone (ALDACTONE) tablet 25 mg  25 mg Oral DAILY    acetaminophen (TYLENOL) suppository 650 mg  650 mg Rectal Q4H PRN    hydrALAZINE (APRESOLINE) tablet 25 mg  25 mg Oral TID    levoFLOXacin (LEVAQUIN) 750 mg in D5W IVPB  750 mg IntraVENous Q48H    sodium chloride (NS) flush 5-10 mL  5-10 mL IntraVENous Q8H    sodium chloride (NS) flush 5-10 mL  5-10 mL IntraVENous PRN    albuterol-ipratropium (DUO-NEB) 2.5 MG-0.5 MG/3 ML  3 mL Nebulization Q4H RT    budesonide (PULMICORT) 500 mcg/2 ml nebulizer suspension  500 mcg Nebulization BID RT    acetylcysteine (MUCOMYST) 200 mg/mL (20 %) solution 200 mg  200 mg Nebulization QID RT    hydrALAZINE (APRESOLINE) 20 mg/mL injection 10 mg  10 mg IntraVENous Q6H PRN    0.9% sodium chloride infusion 250 mL  250 mL IntraVENous PRN    sodium chloride (NS) flush 20 mL  20 mL InterCATHeter PRN    sodium chloride (NS) flush 10 mL  10 mL InterCATHeter Q24H    sodium chloride (NS) flush 10 mL  10 mL InterCATHeter PRN    sodium chloride (NS) flush 10 mL  10 mL InterCATHeter Q8H    alteplase (CATHFLO) 1 mg in sterile water (preservative free) 1 mL injection  1 mg InterCATHeter PRN    bacitracin 500 unit/gram packet 1 Packet  1 Packet Topical PRN    sodium chloride (NS) flush 10-30 mL  10-30 mL InterCATHeter PRN    sodium chloride (NS) flush 10 mL  10 mL InterCATHeter Q24H    sodium chloride (NS) flush 10 mL  10 mL InterCATHeter PRN    sodium chloride (NS) flush 10-40 mL  10-40 mL InterCATHeter Q8H    sodium chloride (NS) flush 20 mL  20 mL InterCATHeter Q24H    anastrozole (ARIMIDEX) tablet 1 mg  1 mg Oral DAILY    0.9% sodium chloride infusion 250 mL  250 mL IntraVENous PRN    epoetin celio (EPOGEN;PROCRIT) injection 10,000 Units  10,000 Units SubCUTAneous Q MON, WED & FRI    albuterol-ipratropium (DUO-NEB) 2.5 MG-0.5 MG/3 ML  3 mL Nebulization Q6H PRN    glucose chewable tablet 16 g  4 Tab Oral PRN    dextrose (D50W) injection syrg 12.5-25 g  12.5-25 g IntraVENous PRN    glucagon (GLUCAGEN) injection 1 mg  1 mg IntraMUSCular PRN    insulin lispro (HUMALOG) injection   SubCUTAneous AC&HS    prochlorperazine (COMPAZINE) with saline injection 5 mg  5 mg IntraVENous Q8H PRN    ascorbic acid (vitamin C) (VITAMIN C) tablet 500 mg  500 mg Oral DAILY    cholecalciferol (VITAMIN D3) tablet 1,000 Units  1,000 Units Oral DAILY    folic acid (FOLVITE) tablet 1 mg  1 mg Oral DAILY    magnesium oxide (MAG-OX) tablet 400 mg  400 mg Oral DAILY    therapeutic multivitamin (THERAGRAN) tablet 1 Tab  1 Tab Oral DAILY    fish oil-omega-3 fatty acids 340-1,000 mg capsule 1 Cap  1 Cap Oral DAILY    sodium chloride (NS) flush 5-10 mL  5-10 mL IntraVENous Q8H    sodium chloride (NS) flush 5-10 mL  5-10 mL IntraVENous PRN    ondansetron (ZOFRAN) injection 4 mg  4 mg IntraVENous Q4H PRN    levothyroxine (SYNTHROID) tablet 88 mcg  88 mcg Oral ACB     ______________________________________________________________________  EXPECTED LENGTH OF STAY: 4d 21h  ACTUAL LENGTH OF STAY:          16                 Bill Deras MD

## 2018-05-02 NOTE — PROGRESS NOTES
TRANSFER - OUT REPORT:    Verbal report given to [address] [place], [place] [address]carmen Reynolds RN(name) on Darwin Trinidad  being transferred to [facility]) for routine progression of care       Report consisted of patients Situation, Background, Assessment and   Recommendations(SBAR). Information from the following report(s) SBAR, Kardex, ED Summary, Procedure Summary, Intake/Output, MAR and Recent Results was reviewed with the receiving nurse. Lines:   PICC Triple Lumen 04/25/18 Right (Active)   Central Line Being Utilized Yes 5/2/2018 12:00 PM   Criteria for Appropriate Use Hemodynamically unstable, requiring monitoring lines, vasopressors, or volume resuscitation 5/2/2018 12:00 PM   Site Assessment Clean, dry, & intact 5/2/2018 12:00 PM   Phlebitis Assessment 0 5/2/2018 12:00 PM   Infiltration Assessment 0 5/2/2018 12:00 PM   Arm Circumference (cm) 25 cm 4/25/2018  5:44 PM   Date of Last Dressing Change 04/25/18 5/2/2018 12:00 PM   Dressing Status Clean, dry, & intact 5/2/2018 12:00 PM   External Catheter Length (cm) 1 centimeters 4/25/2018  5:44 PM   Dressing Type Disk with Chlorhexadine gluconate (CHG); Transparent 5/2/2018 12:00 PM   Action Taken Open ports on tubing capped 5/2/2018 12:00 AM   Hub Color/Line Status Red 5/2/2018 12:00 PM   Positive Blood Return (Site #1) Yes 5/2/2018 12:00 PM   Hub Color/Line Status White 5/2/2018 12:00 PM   Positive Blood Return (Site #2) Yes 5/2/2018 12:00 PM   Hub Color/Line Status Cherelle Nyhan 5/2/2018 12:00 PM   Positive Blood Return (Site #3) Yes 5/2/2018 12:00 PM   Alcohol Cap Used Yes 5/2/2018 12:00 PM       Peripheral IV 04/23/18 Right Forearm (Active)   Site Assessment Clean, dry, & intact 5/2/2018 12:00 PM   Phlebitis Assessment 0 5/2/2018 12:00 PM   Infiltration Assessment 0 5/2/2018 12:00 PM   Dressing Status Clean, dry, & intact 5/2/2018 12:00 PM   Dressing Type Tape;Transparent 5/2/2018 12:00 PM   Hub Color/Line Status Blue;Capped 5/2/2018 12:00 PM   Action Taken Open ports on tubing capped 5/1/2018  4:00 PM   Alcohol Cap Used Yes 5/2/2018 12:00 PM        Opportunity for questions and clarification was provided.       Patient transported with:   Monitor  O2 @ 2 liters  Registered Nurse  [facility]

## 2018-05-02 NOTE — PROGRESS NOTES
[facility]   [address] [facility], [address] [place] [address] [address], [facility]  Phone: [phone]   [phone]       Nephrology Progress Note  Jose Blum     1935     [id number]  Date of Admission : 4/16/2018 05/02/18    CC: Follow up for JEROD        Assessment and Plan   JEROD on CKD:  - 2/2 ATN + labile HTN and ongoing diuretics  - resolved  - lasix 20mg IV x 1 ordered  - follow UOP and diurese as needed  - daily labs    Hypokalemia :   - resolved  - cont aldactone    E. 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 Cr stable. Some mucus plugging on bronch from yesterday. Eating breakfast with some assistance. Mild confusion, no cp, sob, n/v/d reported. Review of Systems: Review of systems not obtained due to patient factors.     Current Medications:   Current Facility-Administered Medications   Medication Dose Route Frequency    naloxone (NARCAN) injection 0.4 mg  0.4 mg IntraVENous Multiple    flumazenil (ROMAZICON) 0.1 mg/mL injection 0.2 mg  0.2 mg IntraVENous Multiple    midazolam (VERSED) injection 0.25-5 mg  0.25-5 mg IntraVENous Multiple    carvedilol (COREG) tablet 6.25 mg  6.25 mg Oral BID WITH MEALS    meropenem (MERREM) 500 mg in 0.9% sodium chloride (MBP/ADV) 50 mL  0.5 g IntraVENous Q8H    fluconazole (DIFLUCAN) 200mg/100 mL IVPB (premix)  200 mg IntraVENous Q24H  aspirin chewable tablet 81 mg  81 mg Oral DAILY    spironolactone (ALDACTONE) tablet 25 mg  25 mg Oral DAILY    acetaminophen (TYLENOL) suppository 650 mg  650 mg Rectal Q4H PRN    hydrALAZINE (APRESOLINE) tablet 25 mg  25 mg Oral TID    levoFLOXacin (LEVAQUIN) 750 mg in D5W IVPB  750 mg IntraVENous Q48H    sodium chloride (NS) flush 5-10 mL  5-10 mL IntraVENous Q8H    sodium chloride (NS) flush 5-10 mL  5-10 mL IntraVENous PRN    albuterol-ipratropium (DUO-NEB) 2.5 MG-0.5 MG/3 ML  3 mL Nebulization Q4H RT    budesonide (PULMICORT) 500 mcg/2 ml nebulizer suspension  500 mcg Nebulization BID RT    acetylcysteine (MUCOMYST) 200 mg/mL (20 %) solution 200 mg  200 mg Nebulization QID RT    hydrALAZINE (APRESOLINE) 20 mg/mL injection 10 mg  10 mg IntraVENous Q6H PRN    0.9% sodium chloride infusion 250 mL  250 mL IntraVENous PRN    sodium chloride (NS) flush 20 mL  20 mL InterCATHeter PRN    sodium chloride (NS) flush 10 mL  10 mL InterCATHeter Q24H    sodium chloride (NS) flush 10 mL  10 mL InterCATHeter PRN    sodium chloride (NS) flush 10 mL  10 mL InterCATHeter Q8H    alteplase (CATHFLO) 1 mg in sterile water (preservative free) 1 mL injection  1 mg InterCATHeter PRN    bacitracin 500 unit/gram packet 1 Packet  1 Packet Topical PRN    sodium chloride (NS) flush 10-30 mL  10-30 mL InterCATHeter PRN    sodium chloride (NS) flush 10 mL  10 mL InterCATHeter Q24H    sodium chloride (NS) flush 10 mL  10 mL InterCATHeter PRN    sodium chloride (NS) flush 10-40 mL  10-40 mL InterCATHeter Q8H    sodium chloride (NS) flush 20 mL  20 mL InterCATHeter Q24H    anastrozole (ARIMIDEX) tablet 1 mg  1 mg Oral DAILY    0.9% sodium chloride infusion 250 mL  250 mL IntraVENous PRN    epoetin celio (EPOGEN;PROCRIT) injection 10,000 Units  10,000 Units SubCUTAneous Q MON, WED & FRI    albuterol-ipratropium (DUO-NEB) 2.5 MG-0.5 MG/3 ML  3 mL Nebulization Q6H PRN    glucose chewable tablet 16 g  4 Tab Oral PRN  dextrose (D50W) injection syrg 12.5-25 g  12.5-25 g IntraVENous PRN    glucagon (GLUCAGEN) injection 1 mg  1 mg IntraMUSCular PRN    insulin lispro (HUMALOG) injection   SubCUTAneous AC&HS    prochlorperazine (COMPAZINE) with saline injection 5 mg  5 mg IntraVENous Q8H PRN    ascorbic acid (vitamin C) (VITAMIN C) tablet 500 mg  500 mg Oral DAILY    cholecalciferol (VITAMIN D3) tablet 1,000 Units  1,000 Units Oral DAILY    folic acid (FOLVITE) tablet 1 mg  1 mg Oral DAILY    magnesium oxide (MAG-OX) tablet 400 mg  400 mg Oral DAILY    therapeutic multivitamin (THERAGRAN) tablet 1 Tab  1 Tab Oral DAILY    fish oil-omega-3 fatty acids 340-1,000 mg capsule 1 Cap  1 Cap Oral DAILY    sodium chloride (NS) flush 5-10 mL  5-10 mL IntraVENous Q8H    sodium chloride (NS) flush 5-10 mL  5-10 mL IntraVENous PRN    ondansetron (ZOFRAN) injection 4 mg  4 mg IntraVENous Q4H PRN    levothyroxine (SYNTHROID) tablet 88 mcg  88 mcg Oral ACB      Allergies   Allergen Reactions    Statins-Hmg-Coa Reductase Inhibitors Other (comments)     Intolerant to statins     Sulfa (Sulfonamide Antibiotics) Other (comments)     syncope       Objective:  Vitals:    Vitals:    05/02/18 0500 05/02/18 0600 05/02/18 0700 05/02/18 0800   BP: 125/52 136/55 154/67    Pulse: 94 99 (!) 102    Resp: 23 27 28    Temp:    99.1 °F (37.3 °C)   SpO2: 97% 96% 99%    Weight: 57.1 kg (125 lb 14.1 oz)      Height:         Intake and Output:     04/30 1901 - 05/02 0700  In: 409.3 [P.O.:60; I.V.:349.3]  Out: 2250 [Urine:2250]    Physical Examination:    General: Alert, frail  Neck:  Supple, no mass  Resp:  Coarse breath sounds b/l  CV:  RRR, no murmur  GI:  Soft, NT, + BS  Ext;                  No LE edema  Neurologic:  Non focal   :                  Alford in place    []    High complexity decision making was performed  []    Patient is at high-risk of decompensation with multiple organ involvement    Lab Data Personally Reviewed: I have reviewed all the pertinent labs, microbiology data and radiology studies during assessment. Recent Labs      05/02/18   0241  05/01/18   0407  04/30/18   1046  04/29/18   0904   NA  138  138  139  146*   K  3.8  4.2  4.6  3.3*   CL  100  100  102  105   CO2  30  31  31  34*   GLU  184*  225*  237*  209*   BUN  30*  34*  39*  43*   CREA  1.01  1.08*  1.21*  1.39*   CA  9.1  9.1  8.7  8.5   MG  1.6   --   1.8   --    PHOS   --    --   2.6  2.6   ALB  1.9*  2.2*  2.3*  2.4*   SGOT  44*  75*   --    --    ALT  19  26   --    --      Recent Labs      05/02/18   0241 05/01/18   0407  04/30/18   1046   WBC  17.4*  21.1*  19.3*   HGB  9.5*  10.1*  9.3*   HCT  30.2*  32.0*  30.4*   PLT  180  187  194     No results found for: SDES  Lab Results   Component Value Date/Time    Culture result: PENDING 05/01/2018 11:30 AM    Culture result: LIGHT APPARENT CANDIDA ALBICANS (A) 04/26/2018 09:10 AM    Culture result: NO NORMAL RESPIRATORY BERYL ISOLATED 04/26/2018 09:10 AM    Culture result: MODERATE APPARENT CANDIDA ALBICANS (A) 04/25/2018 09:40 AM    Culture result: RARE NORMAL RESPIRATORY BERYL 04/25/2018 09:40 AM    Culture result: HEAVY CANDIDA ALBICANS (A) 04/25/2018 09:40 AM    Culture result:  04/25/2018 09:40 AM     CULTURE WILL BE HELD FOR 4 WEEKS. IF THERE IS ADDITIONAL FUNGAL GROWTH, A NEW REPORT WILL FOLLOW.      Recent Results (from the past 24 hour(s))   CULTURE, RESPIRATORY/SPUTUM/BRONCH W GRAM STAIN    Collection Time: 05/01/18 11:30 AM   Result Value Ref Range    Special Requests: NO SPECIAL REQUESTS      GRAM STAIN 1+ WBCS SEEN      GRAM STAIN OCCASIONAL EPITHELIAL CELLS SEEN      GRAM STAIN NO ORGANISMS SEEN      Culture result: PENDING    GLUCOSE, POC    Collection Time: 05/01/18 11:55 AM   Result Value Ref Range    Glucose (POC) 168 (H) 65 - 100 mg/dL    Performed by Sherran Patrick    GLUCOSE, POC    Collection Time: 05/01/18  4:27 PM   Result Value Ref Range    Glucose (POC) 119 (H) 65 - 100 mg/dL    Performed by Jana Dumont Shila    GLUCOSE, POC    Collection Time: 05/01/18 10:03 PM   Result Value Ref Range    Glucose (POC) 215 (H) 65 - 100 mg/dL    Performed by Lita Bowie    CBC WITH AUTOMATED DIFF    Collection Time: 05/02/18  2:41 AM   Result Value Ref Range    WBC 17.4 (H) 3.6 - 11.0 K/uL    RBC 3.31 (L) 3.80 - 5.20 M/uL    HGB 9.5 (L) 11.5 - 16.0 g/dL    HCT 30.2 (L) 35.0 - 47.0 %    MCV 91.2 80.0 - 99.0 FL    MCH 28.7 26.0 - 34.0 PG    MCHC 31.5 30.0 - 36.5 g/dL    RDW 23.0 (H) 11.5 - 14.5 %    PLATELET 059 609 - 314 K/uL    MPV 11.0 8.9 - 12.9 FL    NRBC 0.2 (H) 0  WBC    ABSOLUTE NRBC 0.04 (H) 0.00 - 0.01 K/uL    NEUTROPHILS 81 (H) 32 - 75 %    LYMPHOCYTES 8 (L) 12 - 49 %    MONOCYTES 10 5 - 13 %    EOSINOPHILS 0 0 - 7 %    BASOPHILS 0 0 - 1 %    IMMATURE GRANULOCYTES 1 (H) 0.0 - 0.5 %    ABS. NEUTROPHILS 14.1 (H) 1.8 - 8.0 K/UL    ABS. LYMPHOCYTES 1.4 0.8 - 3.5 K/UL    ABS. MONOCYTES 1.7 (H) 0.0 - 1.0 K/UL    ABS. EOSINOPHILS 0.0 0.0 - 0.4 K/UL    ABS. BASOPHILS 0.0 0.0 - 0.1 K/UL    ABS. IMM. GRANS. 0.2 (H) 0.00 - 0.04 K/UL    DF MANUAL      RBC COMMENTS OVALOCYTES  PRESENT        RBC COMMENTS TARGET CELLS  PRESENT        RBC COMMENTS POLYCHROMASIA  1+        RBC COMMENTS ANISOCYTOSIS  2+       METABOLIC PANEL, COMPREHENSIVE    Collection Time: 05/02/18  2:41 AM   Result Value Ref Range    Sodium 138 136 - 145 mmol/L    Potassium 3.8 3.5 - 5.1 mmol/L    Chloride 100 97 - 108 mmol/L    CO2 30 21 - 32 mmol/L    Anion gap 8 5 - 15 mmol/L    Glucose 184 (H) 65 - 100 mg/dL    BUN 30 (H) 6 - 20 MG/DL    Creatinine 1.01 0.55 - 1.02 MG/DL    BUN/Creatinine ratio 30 (H) 12 - 20      GFR est AA >60 >60 ml/min/1.73m2    GFR est non-AA 53 (L) >60 ml/min/1.73m2    Calcium 9.1 8.5 - 10.1 MG/DL    Bilirubin, total 0.6 0.2 - 1.0 MG/DL    ALT (SGPT) 19 12 - 78 U/L    AST (SGOT) 44 (H) 15 - 37 U/L    Alk.  phosphatase 386 (H) 45 - 117 U/L    Protein, total 6.2 (L) 6.4 - 8.2 g/dL    Albumin 1.9 (L) 3.5 - 5.0 g/dL    Globulin 4.3 (H) 2.0 - 4.0 g/dL    A-G Ratio 0.4 (L) 1.1 - 2.2     MAGNESIUM    Collection Time: 05/02/18  2:41 AM   Result Value Ref Range    Magnesium 1.6 1.6 - 2.4 mg/dL   GLUCOSE, POC    Collection Time: 05/02/18  6:27 AM   Result Value Ref Range    Glucose (POC) 178 (H) 65 - 100 mg/dL    Performed by Cora Purdy        I have reviewed the flowsheets. Chart and Pertinent Notes have been reviewed. No change in PMH ,family and social history from Consult note.       Daphnie Grossman MD

## 2018-05-02 NOTE — PROGRESS NOTES
Problem: Falls - Risk of  Goal: *Absence of Falls  Document Yaron Fall Risk and appropriate interventions in the flowsheet.    Outcome: Progressing Towards Goal  Fall Risk Interventions:  Mobility Interventions: Assess mobility with egress test, Bed/chair exit alarm, Patient to call before getting OOB, Strengthening exercises (ROM-active/passive), Utilize walker, cane, or other assitive device, Utilize gait belt for transfers/ambulation, PT Consult for mobility concerns, PT Consult for assist device competence, Communicate number of staff needed for ambulation/transfer    Mentation Interventions: Adequate sleep, hydration, pain control, Bed/chair exit alarm, Door open when patient unattended, Update white board, Toileting rounds, Room close to nurse's station, Reorient patient, Increase mobility, More frequent rounding, Family/sitter at bedside, Familiar objects from home, Eyeglasses and hearing aids    Medication Interventions: Bed/chair exit alarm, Evaluate medications/consider consulting pharmacy, Patient to call before getting OOB    Elimination Interventions: Bed/chair exit alarm, Call light in reach, Patient to call for help with toileting needs, Toileting schedule/hourly rounds    History of Falls Interventions: Door open when patient unattended, Bed/chair exit alarm, Investigate reason for fall, Room close to nurse's station

## 2018-05-02 NOTE — PROGRESS NOTES
Problem: Mobility Impaired (Adult and Pediatric)  Goal: *Acute Goals and Plan of Care (Insert Text)  Physical Therapy Goals  Initiated 4/30/2018  1. Patient will move from supine to sit and sit to supine , scoot up and down and roll side to side in bed with moderate assistance  within 7 day(s). 2.  Patient will transfer from bed to chair and chair to bed with moderate assistance  using the least restrictive device within 7 day(s). 3.  Patient will perform sit to stand with moderate assistance  within 7 day(s). 4.  Patient will ambulate with moderate assistance  for 25 feet with the least restrictive device within 7 day(s). 5.  Patient will tolerate OOB in chair x 30 minutes for improved activity tolerance within 7 days. Physical Therapy Goals  Initiated 4/20/2018  1. Patient will move from supine to sit and sit to supine , scoot up and down and roll side to side in bed with minimal assistance/contact guard assist within 7 day(s). 2.  Patient will transfer from bed to chair and chair to bed with minimal assistance/contact guard assist using the least restrictive device within 7 day(s). 3.  Patient will perform sit to stand with minimal assistance/contact guard assist within 7 day(s). 4.  Patient will ambulate with minimal assistance/contact guard assist for 25 feet with the least restrictive device within 7 day(s). 5.  Patient will ascend/descend 3 stairs with 2 handrail(s) with minimal assistance/contact guard assist within 7 day(s). physical Therapy TREATMENT  Patient: Ministerio Linares (39 y.o. female)  Date: 5/2/2018  Diagnosis: SOB (shortness of breath)  colon  Anemia  COLON SOB (shortness of breath)    6 Days Post-Op  Precautions: Fall, DNR  Chart, physical therapy assessment, plan of care and goals were reviewed. ASSESSMENT:  Patient cleared by RN for continued mobility with PT. Limited to AROM exercises this date due to respiratory function. Patient aid present and engaged in exercises. Continues to be very weak. Recommend rehab when medically stable. Progression toward goals:  [x]    Improving appropriately and progressing toward goals  [x]    Improving slowly and progressing toward goals  []    Not making progress toward goals and plan of care will be adjusted     PLAN:  Patient continues to benefit from skilled intervention to address the above impairments. Continue treatment per established plan of care. Discharge Recommendations:  Rehab  Further Equipment Recommendations for Discharge:  tbd     SUBJECTIVE:   Patient stated thanks.     OBJECTIVE DATA SUMMARY:   Critical Behavior:  Neurologic State: Alert  Orientation Level: Oriented X4  Cognition: Appropriate decision making, Appropriate for age attention/concentration, Follows commands  Safety/Judgement: Awareness of environment  Functional Mobility Training:  Bed Mobility:  Rolling: Maximum assistance  Supine to Sit:  (Utilized bed/chair position)        Pain:  Pain Scale 1: Numeric (0 - 10)  Pain Intensity 1: 0              Activity Tolerance:   low  Please refer to the flowsheet for vital signs taken during this treatment.   After treatment:   []    Patient left in no apparent distress sitting up in chair  [x]    Patient left in no apparent distress in bed  [x]    Call bell left within reach  [x]    Nursing notified  [x]    Caregiver present  []    Bed alarm activated    COMMUNICATION/COLLABORATION:   The patients plan of care was discussed with: Occupational Therapist and Registered Nurse    Hayley Ansari PT, DPT   Time Calculation: 10 mins

## 2018-05-02 NOTE — PROGRESS NOTES
Pulmonary, Critical Care, and Sleep Medicine~Progress Note    Name: Florian West MRN: [id number]   : 1935 Hospital: [address]   Date: 2018 11:37 AM Admission: 2018      Impression Plan   1. Metastatic breast cancer- new dx- left breast mass on MRI with diffuse skeletal mets. Follows with Dr. Prerna Goff  2. Acute hypercarbic/hypoxic resp failure-pCO2 ok range  3. Altered mental status  4. NSTEMI - ECHO EF 40%  5. JEROD on CKD  6. GERD  7. E Coli UTI 1. O2 titration above 90%,    2. PT/OT  3. Noted SLP's plan   4. Hemodynamically stable  5. diuresis intermittently   6. BP control   7. Correct electrolytes as needed   8. ABX per ID  9. Patient followed by multiple specialties   10. NIV as needed   11. Strong pulmonary toilet, including chest PT  12. Hospitalist ordered chest x-ray today   13. Discussed with RN/Attending     Daily Progression:    :   feels better today after bronchoscopy, but still quite coarse. WBC down, creatinine better, cultures from yesterday are pending. MBS was normal.  Noted poor cough reflex. In bed without distress. On NC.      :  Looked comfortable this morning. Noted events overnight. Chest film with left opacification, received lasix last night     I have reviewed the labs and previous days notes. Review of systems not obtained due to patient factors.     OBJECTIVE:     Vital Signs:       Visit Vitals    /67    Pulse (!) 102    Temp 99.1 °F (37.3 °C)    Resp 28    Ht 5' 1\" (1.549 m)    Wt 57.1 kg (125 lb 14.1 oz)    SpO2 99%    BMI 23.79 kg/m2      Temp (24hrs), Av.3 °F (37.4 °C), Min:98.5 °F (36.9 °C), Max:100.8 °F (38.2 °C)     Intake/Output:     Last shift:      Last 3 shifts:  1901 -  0700  In: 409.3 [P.O.:60; I.V.:349.3]  Out: 2250 [Urine:2250]          Intake/Output Summary (Last 24 hours) at 18 0823  Last data filed at 18 0400   Gross per 24 hour Intake           314.25 ml   Output              650 ml   Net          -335.75 ml       Physical Exam:                                        Exam Findings Other   General: No resp distress noted, appears stated age    [de-identified]:  No ulcers, JVD not elevated, no cervical LAD    Chest: No pectus deformity, normal chest rise b/l    HEART:  RRR, no murmurs/rubs/gallops    Lungs:  Coarse     ABD: Soft/NT, non rigid mildly distended    EXT: No cyanosis/clubbing/edema, normal peripheral pulses    Skin: No rashes or ulcers, no mottling    Neuro: Alert and answers simple questions         Medications:  Current Facility-Administered Medications   Medication Dose Route Frequency    furosemide (LASIX) injection 20 mg  20 mg IntraVENous ONCE    naloxone (NARCAN) injection 0.4 mg  0.4 mg IntraVENous Multiple    flumazenil (ROMAZICON) 0.1 mg/mL injection 0.2 mg  0.2 mg IntraVENous Multiple    midazolam (VERSED) injection 0.25-5 mg  0.25-5 mg IntraVENous Multiple    carvedilol (COREG) tablet 6.25 mg  6.25 mg Oral BID WITH MEALS    meropenem (MERREM) 500 mg in 0.9% sodium chloride (MBP/ADV) 50 mL  0.5 g IntraVENous Q8H    fluconazole (DIFLUCAN) 200mg/100 mL IVPB (premix)  200 mg IntraVENous Q24H    aspirin chewable tablet 81 mg  81 mg Oral DAILY    spironolactone (ALDACTONE) tablet 25 mg  25 mg Oral DAILY    acetaminophen (TYLENOL) suppository 650 mg  650 mg Rectal Q4H PRN    hydrALAZINE (APRESOLINE) tablet 25 mg  25 mg Oral TID    levoFLOXacin (LEVAQUIN) 750 mg in D5W IVPB  750 mg IntraVENous Q48H    sodium chloride (NS) flush 5-10 mL  5-10 mL IntraVENous Q8H    sodium chloride (NS) flush 5-10 mL  5-10 mL IntraVENous PRN    albuterol-ipratropium (DUO-NEB) 2.5 MG-0.5 MG/3 ML  3 mL Nebulization Q4H RT    budesonide (PULMICORT) 500 mcg/2 ml nebulizer suspension  500 mcg Nebulization BID RT    acetylcysteine (MUCOMYST) 200 mg/mL (20 %) solution 200 mg  200 mg Nebulization QID RT    hydrALAZINE (APRESOLINE) 20 mg/mL injection 10 mg  10 mg IntraVENous Q6H PRN    0.9% sodium chloride infusion 250 mL  250 mL IntraVENous PRN    sodium chloride (NS) flush 20 mL  20 mL InterCATHeter PRN    sodium chloride (NS) flush 10 mL  10 mL InterCATHeter Q24H    sodium chloride (NS) flush 10 mL  10 mL InterCATHeter PRN    sodium chloride (NS) flush 10 mL  10 mL InterCATHeter Q8H    alteplase (CATHFLO) 1 mg in sterile water (preservative free) 1 mL injection  1 mg InterCATHeter PRN    bacitracin 500 unit/gram packet 1 Packet  1 Packet Topical PRN    sodium chloride (NS) flush 10-30 mL  10-30 mL InterCATHeter PRN    sodium chloride (NS) flush 10 mL  10 mL InterCATHeter Q24H    sodium chloride (NS) flush 10 mL  10 mL InterCATHeter PRN    sodium chloride (NS) flush 10-40 mL  10-40 mL InterCATHeter Q8H    sodium chloride (NS) flush 20 mL  20 mL InterCATHeter Q24H    anastrozole (ARIMIDEX) tablet 1 mg  1 mg Oral DAILY    0.9% sodium chloride infusion 250 mL  250 mL IntraVENous PRN    epoetin celio (EPOGEN;PROCRIT) injection 10,000 Units  10,000 Units SubCUTAneous Q MON, WED & FRI    albuterol-ipratropium (DUO-NEB) 2.5 MG-0.5 MG/3 ML  3 mL Nebulization Q6H PRN    glucose chewable tablet 16 g  4 Tab Oral PRN    dextrose (D50W) injection syrg 12.5-25 g  12.5-25 g IntraVENous PRN    glucagon (GLUCAGEN) injection 1 mg  1 mg IntraMUSCular PRN    insulin lispro (HUMALOG) injection   SubCUTAneous AC&HS    prochlorperazine (COMPAZINE) with saline injection 5 mg  5 mg IntraVENous Q8H PRN    ascorbic acid (vitamin C) (VITAMIN C) tablet 500 mg  500 mg Oral DAILY    cholecalciferol (VITAMIN D3) tablet 1,000 Units  1,000 Units Oral DAILY    folic acid (FOLVITE) tablet 1 mg  1 mg Oral DAILY    magnesium oxide (MAG-OX) tablet 400 mg  400 mg Oral DAILY    therapeutic multivitamin (THERAGRAN) tablet 1 Tab  1 Tab Oral DAILY    fish oil-omega-3 fatty acids 340-1,000 mg capsule 1 Cap  1 Cap Oral DAILY    sodium chloride (NS) flush 5-10 mL  5-10 mL IntraVENous Q8H    sodium chloride (NS) flush 5-10 mL  5-10 mL IntraVENous PRN    ondansetron (ZOFRAN) injection 4 mg  4 mg IntraVENous Q4H PRN    levothyroxine (SYNTHROID) tablet 88 mcg  88 mcg Oral ACB       Labs:  ABG No results for input(s): PHI, PCO2I, PO2I, HCO3I, SO2I, FIO2I in the last 72 hours.      CBC Recent Labs      05/02/18   0241 05/01/18   0407  04/30/18   1046   WBC  17.4*  21.1*  19.3*   HGB  9.5*  10.1*  9.3*   HCT  30.2*  32.0*  30.4*   PLT  180  187  194   MCV  91.2  92.5  94.4   MCH  28.7  29.2  77.2        Metabolic  Panel Recent Labs      05/02/18   0241 05/01/18   0407  04/30/18   1046  04/29/18   0904   NA  138  138  139  146*   K  3.8  4.2  4.6  3.3*   CL  100  100  102  105   CO2  30  31  31  34*   GLU  184*  225*  237*  209*   BUN  30*  34*  39*  43*   CREA  1.01  1.08*  1.21*  1.39*   CA  9.1  9.1  8.7  8.5   MG  1.6   --   1.8   --    PHOS   --    --   2.6  2.6   ALB  1.9*  2.2*  2.3*  2.4*   SGOT  44*  75*   --    --    ALT  19  26   --    --         Pertinent Labs                JEAN PAUL Riso  5/2/2018

## 2018-05-02 NOTE — WOUND CARE
Wound Care Note:     New consult placed by nurse request for gluteal fold skin breakdown    Chart shows:  Admitted for shortness of breath   Past Medical History:   Diagnosis Date    Anemia 9/2/2009    Colon cancer ([facility] 9/2/2009    surgery/chemo    Colon cancer ([facility] 9/2/2009    DM (diabetes mellitus) ([facility] 9/2/2009    GERD (gastroesophageal reflux disease)     H/O: CVA 9/2/2009    slight l sided weakness    Hypothyroid 9/2/2009    Ill-defined condition     seasonal allergies    Microalbuminuria 9/2/2009    Osteoporosis 9/2/2009    Other and unspecified hyperlipidemia 1/27/2010    Rheumatoid arthritis involving ankle ([facility] 9/28/2016    Rheumatoid arthritis(714.0) 9/2/2009    Unspecified essential hypertension 9/2/2009    Weakness due to cerebrovascular accident      WBC = 17.4 on 5/2/18    Assessment:   Patient is alert, she did not speak while in the room, incontinent with some assistance needed in repositioning. Bed: Total Care- in house Total Care Sport to be delivered  Patient wearing briefs for incontinence and has a [place] in place   Diet: Cardiac regular, 2 grams sodium  Patient no grimacing or moaning when turning    Bilateral heels and buttocks skin intact and without erythema. 1. Sacral wound measures 0.7 cm x 1.5 cm x 0.1 cm, wound bed is pink and blanches well, no drainage, manda-wound intact and wound edges are open. Spoke with Dr. Javon Manriquez, wound care orders obtained. Patient repositioned on right side   Heels offloaded on pillow. Recommendations:    Sacrum- Every 12 hours gently cleanse away any soiled cream with soap and water, blot dry, apply a thick coat of [employer] Protective Barrier with zinc oxide. Reapply PRN. Total Care Sport - in house    Skin Care & Pressure Prevention:  Minimize layers of linen/pads under patient to optimize support surface.     Turn/reposition approximately every 2 hours and offload heels.   Manage incontinence / promote continence     Discussed above plan with patient & Brannon Fajardo RN    Transition of Care: Plan to follow as needed while admitted to hospital.    Arettdanielito Grade" SHOSHANA Newton, RN, [facility]  office [phone]  pager [phone] or call  to page

## 2018-05-03 LAB
ALBUMIN SERPL-MCNC: 1.7 G/DL (ref 3.5–5)
ALBUMIN/GLOB SERPL: 0.4 {RATIO} (ref 1.1–2.2)
ALP SERPL-CCNC: 325 U/L (ref 45–117)
ALT SERPL-CCNC: 19 U/L (ref 12–78)
ANION GAP SERPL CALC-SCNC: 8 MMOL/L (ref 5–15)
AST SERPL-CCNC: 36 U/L (ref 15–37)
BACTERIA SPEC CULT: ABNORMAL
BACTERIA SPEC CULT: ABNORMAL
BASOPHILS # BLD: 0 K/UL (ref 0–0.1)
BASOPHILS NFR BLD: 0 % (ref 0–1)
BILIRUB SERPL-MCNC: 0.5 MG/DL (ref 0.2–1)
BUN SERPL-MCNC: 24 MG/DL (ref 6–20)
BUN/CREAT SERPL: 31 (ref 12–20)
CALCIUM SERPL-MCNC: 9 MG/DL (ref 8.5–10.1)
CHLORIDE SERPL-SCNC: 100 MMOL/L (ref 97–108)
CO2 SERPL-SCNC: 29 MMOL/L (ref 21–32)
CREAT SERPL-MCNC: 0.78 MG/DL (ref 0.55–1.02)
DIFFERENTIAL METHOD BLD: ABNORMAL
EOSINOPHIL # BLD: 0 K/UL (ref 0–0.4)
EOSINOPHIL NFR BLD: 0 % (ref 0–7)
ERYTHROCYTE [DISTWIDTH] IN BLOOD BY AUTOMATED COUNT: 22.5 % (ref 11.5–14.5)
GLOBULIN SER CALC-MCNC: 4.4 G/DL (ref 2–4)
GLUCOSE BLD STRIP.AUTO-MCNC: 186 MG/DL (ref 65–100)
GLUCOSE BLD STRIP.AUTO-MCNC: 211 MG/DL (ref 65–100)
GLUCOSE BLD STRIP.AUTO-MCNC: 221 MG/DL (ref 65–100)
GLUCOSE BLD STRIP.AUTO-MCNC: 242 MG/DL (ref 65–100)
GLUCOSE SERPL-MCNC: 209 MG/DL (ref 65–100)
GRAM STN SPEC: ABNORMAL
HCT VFR BLD AUTO: 30.4 % (ref 35–47)
HGB BLD-MCNC: 9.6 G/DL (ref 11.5–16)
IMM GRANULOCYTES # BLD: 0.2 K/UL (ref 0–0.04)
IMM GRANULOCYTES NFR BLD AUTO: 1 % (ref 0–0.5)
LYMPHOCYTES # BLD: 1.2 K/UL (ref 0.8–3.5)
LYMPHOCYTES NFR BLD: 7 % (ref 12–49)
MAGNESIUM SERPL-MCNC: 1.5 MG/DL (ref 1.6–2.4)
MCH RBC QN AUTO: 28.8 PG (ref 26–34)
MCHC RBC AUTO-ENTMCNC: 31.6 G/DL (ref 30–36.5)
MCV RBC AUTO: 91.3 FL (ref 80–99)
MONOCYTES # BLD: 1.5 K/UL (ref 0–1)
MONOCYTES NFR BLD: 9 % (ref 5–13)
NEUTS SEG # BLD: 13.8 K/UL (ref 1.8–8)
NEUTS SEG NFR BLD: 83 % (ref 32–75)
NRBC # BLD: 0.03 K/UL (ref 0–0.01)
NRBC BLD-RTO: 0.2 PER 100 WBC
PHOSPHATE SERPL-MCNC: 2.3 MG/DL (ref 2.6–4.7)
PLATELET # BLD AUTO: 176 K/UL (ref 150–400)
PLATELET COMMENTS,PCOM: ABNORMAL
PMV BLD AUTO: 10.6 FL (ref 8.9–12.9)
POTASSIUM SERPL-SCNC: 4.2 MMOL/L (ref 3.5–5.1)
PROT SERPL-MCNC: 6.1 G/DL (ref 6.4–8.2)
RBC # BLD AUTO: 3.33 M/UL (ref 3.8–5.2)
RBC MORPH BLD: ABNORMAL
SERVICE CMNT-IMP: ABNORMAL
SODIUM SERPL-SCNC: 137 MMOL/L (ref 136–145)
WBC # BLD AUTO: 16.7 K/UL (ref 3.6–11)
WBC MORPH BLD: ABNORMAL

## 2018-05-03 PROCEDURE — 74011250636 HC RX REV CODE- 250/636: Performed by: INTERNAL MEDICINE

## 2018-05-03 PROCEDURE — 84100 ASSAY OF PHOSPHORUS: CPT | Performed by: INTERNAL MEDICINE

## 2018-05-03 PROCEDURE — 74011250637 HC RX REV CODE- 250/637: Performed by: HOSPITALIST

## 2018-05-03 PROCEDURE — 97530 THERAPEUTIC ACTIVITIES: CPT

## 2018-05-03 PROCEDURE — 74011250636 HC RX REV CODE- 250/636: Performed by: HOSPITALIST

## 2018-05-03 PROCEDURE — 74011000250 HC RX REV CODE- 250: Performed by: HOSPITALIST

## 2018-05-03 PROCEDURE — 83735 ASSAY OF MAGNESIUM: CPT | Performed by: INTERNAL MEDICINE

## 2018-05-03 PROCEDURE — 77010033678 HC OXYGEN DAILY

## 2018-05-03 PROCEDURE — 74011250637 HC RX REV CODE- 250/637: Performed by: INTERNAL MEDICINE

## 2018-05-03 PROCEDURE — 74011636637 HC RX REV CODE- 636/637: Performed by: HOSPITALIST

## 2018-05-03 PROCEDURE — 94664 DEMO&/EVAL PT USE INHALER: CPT

## 2018-05-03 PROCEDURE — 82962 GLUCOSE BLOOD TEST: CPT

## 2018-05-03 PROCEDURE — 94640 AIRWAY INHALATION TREATMENT: CPT

## 2018-05-03 PROCEDURE — 36415 COLL VENOUS BLD VENIPUNCTURE: CPT | Performed by: INTERNAL MEDICINE

## 2018-05-03 PROCEDURE — 74011000258 HC RX REV CODE- 258: Performed by: HOSPITALIST

## 2018-05-03 PROCEDURE — 74011000250 HC RX REV CODE- 250: Performed by: INTERNAL MEDICINE

## 2018-05-03 PROCEDURE — 74011250636 HC RX REV CODE- 250/636: Performed by: NURSE PRACTITIONER

## 2018-05-03 PROCEDURE — 74011250637 HC RX REV CODE- 250/637: Performed by: NURSE PRACTITIONER

## 2018-05-03 PROCEDURE — 80053 COMPREHEN METABOLIC PANEL: CPT | Performed by: INTERNAL MEDICINE

## 2018-05-03 PROCEDURE — 85025 COMPLETE CBC W/AUTO DIFF WBC: CPT | Performed by: HOSPITALIST

## 2018-05-03 PROCEDURE — 65660000000 HC RM CCU STEPDOWN

## 2018-05-03 PROCEDURE — 97535 SELF CARE MNGMENT TRAINING: CPT

## 2018-05-03 RX ORDER — ACETYLCYSTEINE 200 MG/ML
200 SOLUTION ORAL; RESPIRATORY (INHALATION)
Status: DISCONTINUED | OUTPATIENT
Start: 2018-05-03 | End: 2018-05-11 | Stop reason: HOSPADM

## 2018-05-03 RX ORDER — IPRATROPIUM BROMIDE AND ALBUTEROL SULFATE 2.5; .5 MG/3ML; MG/3ML
3 SOLUTION RESPIRATORY (INHALATION) EVERY 8 HOURS
Status: DISCONTINUED | OUTPATIENT
Start: 2018-05-03 | End: 2018-05-03

## 2018-05-03 RX ORDER — INSULIN GLARGINE 100 [IU]/ML
8 INJECTION, SOLUTION SUBCUTANEOUS DAILY
Status: DISCONTINUED | OUTPATIENT
Start: 2018-05-03 | End: 2018-05-04

## 2018-05-03 RX ORDER — MAGNESIUM SULFATE HEPTAHYDRATE 40 MG/ML
2 INJECTION, SOLUTION INTRAVENOUS ONCE
Status: COMPLETED | OUTPATIENT
Start: 2018-05-03 | End: 2018-05-03

## 2018-05-03 RX ORDER — LISINOPRIL 5 MG/1
5 TABLET ORAL DAILY
Status: DISCONTINUED | OUTPATIENT
Start: 2018-05-03 | End: 2018-05-06

## 2018-05-03 RX ORDER — IPRATROPIUM BROMIDE AND ALBUTEROL SULFATE 2.5; .5 MG/3ML; MG/3ML
3 SOLUTION RESPIRATORY (INHALATION)
Status: DISCONTINUED | OUTPATIENT
Start: 2018-05-03 | End: 2018-05-06

## 2018-05-03 RX ADMIN — Medication 400 MG: at 10:22

## 2018-05-03 RX ADMIN — MAGNESIUM SULFATE HEPTAHYDRATE 2 G: 40 INJECTION, SOLUTION INTRAVENOUS at 07:05

## 2018-05-03 RX ADMIN — SPIRONOLACTONE 25 MG: 25 TABLET, FILM COATED ORAL at 10:21

## 2018-05-03 RX ADMIN — CARVEDILOL 6.25 MG: 6.25 TABLET, FILM COATED ORAL at 16:50

## 2018-05-03 RX ADMIN — INSULIN LISPRO 3 UNITS: 100 INJECTION, SOLUTION INTRAVENOUS; SUBCUTANEOUS at 16:50

## 2018-05-03 RX ADMIN — ACETYLCYSTEINE 200 MG: 200 SOLUTION ORAL; RESPIRATORY (INHALATION) at 20:41

## 2018-05-03 RX ADMIN — MEROPENEM 500 MG: 500 INJECTION, POWDER, FOR SOLUTION INTRAVENOUS at 19:15

## 2018-05-03 RX ADMIN — MEROPENEM 500 MG: 500 INJECTION, POWDER, FOR SOLUTION INTRAVENOUS at 11:33

## 2018-05-03 RX ADMIN — ACETYLCYSTEINE 200 MG: 200 SOLUTION ORAL; RESPIRATORY (INHALATION) at 08:13

## 2018-05-03 RX ADMIN — LISINOPRIL 5 MG: 5 TABLET ORAL at 12:37

## 2018-05-03 RX ADMIN — FOLIC ACID 1 MG: 1 TABLET ORAL at 10:21

## 2018-05-03 RX ADMIN — ASPIRIN 81 MG 81 MG: 81 TABLET ORAL at 10:21

## 2018-05-03 RX ADMIN — Medication 10 ML: at 13:54

## 2018-05-03 RX ADMIN — FLUCONAZOLE IN SODIUM CHLORIDE 200 MG: 2 INJECTION, SOLUTION INTRAVENOUS at 12:40

## 2018-05-03 RX ADMIN — Medication 10 ML: at 22:00

## 2018-05-03 RX ADMIN — HYDRALAZINE HYDROCHLORIDE 25 MG: 25 TABLET ORAL at 22:10

## 2018-05-03 RX ADMIN — Medication 10 ML: at 07:11

## 2018-05-03 RX ADMIN — INSULIN GLARGINE 8 UNITS: 100 INJECTION, SOLUTION SUBCUTANEOUS at 12:39

## 2018-05-03 RX ADMIN — THERA TABS 1 TABLET: TAB at 10:21

## 2018-05-03 RX ADMIN — Medication 10 ML: at 07:13

## 2018-05-03 RX ADMIN — HYDRALAZINE HYDROCHLORIDE 25 MG: 25 TABLET ORAL at 16:50

## 2018-05-03 RX ADMIN — Medication 10 ML: at 07:06

## 2018-05-03 RX ADMIN — BUDESONIDE 500 MCG: 0.5 INHALANT RESPIRATORY (INHALATION) at 08:30

## 2018-05-03 RX ADMIN — IPRATROPIUM BROMIDE AND ALBUTEROL SULFATE 3 ML: .5; 3 SOLUTION RESPIRATORY (INHALATION) at 15:49

## 2018-05-03 RX ADMIN — ANASTROZOLE 1 MG: 1 TABLET, COATED ORAL at 10:22

## 2018-05-03 RX ADMIN — IPRATROPIUM BROMIDE AND ALBUTEROL SULFATE 3 ML: .5; 3 SOLUTION RESPIRATORY (INHALATION) at 00:49

## 2018-05-03 RX ADMIN — CARVEDILOL 6.25 MG: 6.25 TABLET, FILM COATED ORAL at 10:22

## 2018-05-03 RX ADMIN — OXYCODONE HYDROCHLORIDE AND ACETAMINOPHEN 1000 MG: 500 TABLET ORAL at 10:22

## 2018-05-03 RX ADMIN — Medication 10 ML: at 22:10

## 2018-05-03 RX ADMIN — BUDESONIDE 500 MCG: 0.5 INHALANT RESPIRATORY (INHALATION) at 20:41

## 2018-05-03 RX ADMIN — Medication 1 CAPSULE: at 10:22

## 2018-05-03 RX ADMIN — Medication 20 ML: at 11:34

## 2018-05-03 RX ADMIN — IPRATROPIUM BROMIDE AND ALBUTEROL SULFATE 3 ML: .5; 3 SOLUTION RESPIRATORY (INHALATION) at 08:13

## 2018-05-03 RX ADMIN — Medication 10 ML: at 10:15

## 2018-05-03 RX ADMIN — Medication 10 ML: at 19:15

## 2018-05-03 RX ADMIN — HYDRALAZINE HYDROCHLORIDE 25 MG: 25 TABLET ORAL at 10:21

## 2018-05-03 RX ADMIN — LEVOTHYROXINE SODIUM 88 MCG: 88 TABLET ORAL at 07:17

## 2018-05-03 RX ADMIN — VITAMIN D, TAB 1000IU (100/BT) 1000 UNITS: 25 TAB at 10:21

## 2018-05-03 RX ADMIN — MAGNESIUM SULFATE HEPTAHYDRATE 2 G: 40 INJECTION, SOLUTION INTRAVENOUS at 10:15

## 2018-05-03 RX ADMIN — INSULIN LISPRO 3 UNITS: 100 INJECTION, SOLUTION INTRAVENOUS; SUBCUTANEOUS at 12:37

## 2018-05-03 RX ADMIN — IPRATROPIUM BROMIDE AND ALBUTEROL SULFATE 3 ML: .5; 3 SOLUTION RESPIRATORY (INHALATION) at 20:41

## 2018-05-03 RX ADMIN — POLYETHYLENE GLYCOL 3350 17 G: 17 POWDER, FOR SOLUTION ORAL at 10:35

## 2018-05-03 RX ADMIN — INSULIN LISPRO 3 UNITS: 100 INJECTION, SOLUTION INTRAVENOUS; SUBCUTANEOUS at 07:05

## 2018-05-03 RX ADMIN — MEROPENEM 500 MG: 500 INJECTION, POWDER, FOR SOLUTION INTRAVENOUS at 04:34

## 2018-05-03 NOTE — PROGRESS NOTES
Problem: Mobility Impaired (Adult and Pediatric)  Goal: *Acute Goals and Plan of Care (Insert Text)  Physical Therapy Goals  Initiated 4/30/2018  1. Patient will move from supine to sit and sit to supine , scoot up and down and roll side to side in bed with moderate assistance  within 7 day(s). 2.  Patient will transfer from bed to chair and chair to bed with moderate assistance  using the least restrictive device within 7 day(s). 3.  Patient will perform sit to stand with moderate assistance  within 7 day(s). 4.  Patient will ambulate with moderate assistance  for 25 feet with the least restrictive device within 7 day(s). 5.  Patient will tolerate OOB in chair x 30 minutes for improved activity tolerance within 7 days. Physical Therapy Goals  Initiated 4/20/2018  1. Patient will move from supine to sit and sit to supine , scoot up and down and roll side to side in bed with minimal assistance/contact guard assist within 7 day(s). 2.  Patient will transfer from bed to chair and chair to bed with minimal assistance/contact guard assist using the least restrictive device within 7 day(s). 3.  Patient will perform sit to stand with minimal assistance/contact guard assist within 7 day(s). 4.  Patient will ambulate with minimal assistance/contact guard assist for 25 feet with the least restrictive device within 7 day(s). 5.  Patient will ascend/descend 3 stairs with 2 handrail(s) with minimal assistance/contact guard assist within 7 day(s). physical Therapy TREATMENT  Patient: Ministerio Linares (79 y.o. female)  Date: 5/3/2018  Diagnosis: SOB (shortness of breath)  colon  Anemia  COLON SOB (shortness of breath)    7 Days Post-Op  Precautions: Fall, DNR  Chart, physical therapy assessment, plan of care and goals were reviewed. ASSESSMENT:  Pt continues to be w/ decreased mobility compared to baseline,demonstrating impaired static sitting balance while seated EOB.  Required Max Ax1-2 to maintain midline sitting as pt w/ significant lateral lean to left. Pt required Total-Max A x2 for bed mobility. Pt completed seated exercises, reported fatigue and requested to return supine. Pt bed>modified chair position, encouraged pt to be in current position at least 3x/day w/ meals to increased activity tolerance and trunk strength. Also encouraged pt to perform BLE isometric exercises 3x/day,10 reps each. Pt remained in chair position w/ caregiver present to assist w/ lunch;call bell in reach. Progression toward goals:  []    Improving appropriately and progressing toward goals  [x]    Improving slowly and progressing toward goals  []    Not making progress toward goals and plan of care will be adjusted     PLAN:  Patient continues to benefit from skilled intervention to address the above impairments. Continue treatment per established plan of care. Discharge Recommendations:  Rehab  Further Equipment Recommendations for Discharge:  TBD     SUBJECTIVE:   Patient stated I'm tired. Can I lay back down.     OBJECTIVE DATA SUMMARY:   Critical Behavior:  Neurologic State: Alert  Orientation Level: Oriented X4  Cognition: Follows commands  Safety/Judgement: Awareness of environment  Functional Mobility Training:  Bed Mobility:  Rolling: Maximum assistance;Assist x2  Supine to Sit: Maximum assistance; Total assistance;Assist x2  Sit to Supine: Total assistance;Assist x2  Scooting: Maximum assistance        Transfers:                                   Balance:  Sitting: Impaired  Sitting - Static: Poor (constant support)  Sitting - Dynamic: Poor (constant support)  Standing:  (deferred d/t impaired sitting )  Ambulation/Gait Training:                                                        Stairs:              Neuro Re-Education:  Facilitation/approximation at hip and shoulder for log roll and supine>sit transfer; facilitation (tapping) to engage trunk muscles whie seated EOB.    Therapeutic Exercises:   Seated Allen alvarenga, Quad sets, glut sets  Pain:  Pain Scale 1: Numeric (0 - 10)  Pain Intensity 1: 0              Activity Tolerance:   Limited. Pt fatigued with minimal EOB activity; reported dizziness (/49-RN aware)  Please refer to the flowsheet for vital signs taken during this treatment.   After treatment:   []    Patient left in no apparent distress sitting up in chair   [x]    Patient left in no apparent distress in bed(chair position)   [x]    Call bell left within reach  [x]    Nursing notified  [x]    Caregiver present  []    Bed alarm activated    COMMUNICATION/COLLABORATION:   The patients plan of care was discussed with: Registered Nurse    Ritchie Stratton,PTA   Time Calculation: 27 mins

## 2018-05-03 NOTE — PROGRESS NOTES
-Hematology / Oncology ([facility]) -  -Primary Oncologist-  Dr Cindy Moncada  -CC- breast cancer    -S-  No new complaints    -O-      Patient Vitals for the past 24 hrs:   Temp Pulse Resp BP SpO2   05/03/18 0813 - - - - 100 %   05/03/18 0757 97.5 °F (36.4 °C) 84 22 140/58 100 %   05/03/18 0705 - 87 - 148/54 -   05/03/18 0414 98.8 °F (37.1 °C) 88 21 151/59 100 %   05/03/18 0049 - - - - 98 %   05/02/18 2328 97.9 °F (36.6 °C) 91 24 156/57 98 %   05/02/18 2206 - 94 - 155/67 -   05/02/18 1936 - - - - 96 %   05/02/18 1923 99 °F (37.2 °C) 100 26 172/72 96 %   05/02/18 1731 - - - 182/77 -   05/02/18 1709 - - - - 98 %   05/02/18 1638 98.8 °F (37.1 °C) (!) 103 28 170/71 96 %   05/02/18 1600 - 94 28 140/62 96 %   05/02/18 1549 98.4 °F (36.9 °C) - - - -   05/02/18 1500 - 93 26 136/61 95 %   05/02/18 1400 - 94 27 134/60 95 %   05/02/18 1331 - 96 25 120/53 97 %   05/02/18 1300 - 97 27 136/63 96 %   05/02/18 1200 97.4 °F (36.3 °C) 94 23 128/53 97 %   05/02/18 1100 - (!) 101 28 126/52 96 %   05/02/18 1000 - 100 26 123/47 94 %     05/03 0701 - 05/03 1900  In: -   Out: 650 [Urine:650]  Gen: nad  Chest: bilateral breath sounds wheezy  Cardiac: rrr  Abd: s/nt    -Labs-    Recent Labs      05/03/18   0446  05/02/18   0241  05/01/18   0407  04/30/18   1046   WBC  16.7*  17.4*  21.1*  19.3*   HGB  9.6*  9.5*  10.1*  9.3*   PLT  176  180  187  194   ANEU  13.8*  14.1*  18.8*  15.9*   NA  137  138  138  139   K  4.2  3.8  4.2  4.6   GLU  209*  184*  225*  237*   BUN  24*  30*  34*  39*   CREA  0.78  1.01  1.08*  1.21*   ALT  19  19  26   --    SGOT  36  44*  75*   --    TBILI  0.5  0.6  0.6   --    AP  325*  386*  484*   --    CA  9.0  9.1  9.1  8.7   MG  1.5*  1.6   --   1.8   PHOS  2.3*   --    --   2.6       -Assessment + Plan-     *) metastatic breast cancer.  ER+, SD+, her-2 neg; bone scan: diffuse mets  ----- continue  Arimidex (hormonal AI therapy) started on 4/24  *) Anemia: suspect AOCD +- marrow involvement,  started procrit weekly on 4/23  ---- prbc x1 4/23, 4/26 . Stable  *) CHF. Catherine Ortiz Per cardiology. .  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  Required bronchoscopy to clear secretions again 5/1     5/3- no new issues, reviewed plan with patient and family

## 2018-05-03 NOTE — PROGRESS NOTES
[facility]   [address] [facility], [address] [place] [address] [address], [facility]  Phone: [phone]   [phone]       Nephrology Progress Note  Davion Lou     1935     [id number]  Date of Admission : 4/16/2018 05/03/18    CC: Follow up for JEROD        Assessment and Plan   JEROD on CKD:  - 2/2 ATN + labile HTN and ongoing diuretics  - resolved  - cont current care  - on aldactone  - loop diuretics PRN  - daily labs for now    E. Coli and Psuedomonas UTI:  - on IV abx    Labile HTN:  - BP stable today  - cont current BP meds for now    CKD Stage III :  - baseline Cr 0.9-1 mg/dl lately   - progressing, Cr around 1.5 to 1.7 here     NSTEMI :  - Echo shows EF 35-45%, mod LVH, Severe Hypokinesis of apex and moderate Hypokinesis of anteroseptal wall     Acute Systolic CHF: 2/2 MI   - avoid  ACE/ARB for now     Chronic Pontine Infarcts      Metastatic Breast Ca:  - per oncology    Anemia :  - per heme/onc     Interval History:  Seen and examined. Cr stable. Feeling ok. Resting, on NC O2 this AM.  No reported issues overnight. BP stable. No cp, sob, n/v/d reported.     Review of Systems: per HPI    Current Medications:   Current Facility-Administered Medications   Medication Dose Route Frequency    magnesium sulfate 2 g/50 ml IVPB (premix or compounded)  2 g IntraVENous ONCE    ascorbic acid (vitamin C) (VITAMIN C) tablet 1,000 mg  1,000 mg Oral DAILY    polyethylene glycol (MIRALAX) packet 17 g  17 g Oral DAILY    naloxone (NARCAN) injection 0.4 mg  0.4 mg IntraVENous Multiple    flumazenil (ROMAZICON) 0.1 mg/mL injection 0.2 mg  0.2 mg IntraVENous Multiple    midazolam (VERSED) injection 0.25-5 mg  0.25-5 mg IntraVENous Multiple    carvedilol (COREG) tablet 6.25 mg  6.25 mg Oral BID WITH MEALS    meropenem (MERREM) 500 mg in 0.9% sodium chloride (MBP/ADV) 50 mL  0.5 g IntraVENous Q8H    fluconazole (DIFLUCAN) 200mg/100 mL IVPB (premix)  200 mg IntraVENous Q24H    aspirin chewable tablet 81 mg 81 mg Oral DAILY    spironolactone (ALDACTONE) tablet 25 mg  25 mg Oral DAILY    acetaminophen (TYLENOL) suppository 650 mg  650 mg Rectal Q4H PRN    hydrALAZINE (APRESOLINE) tablet 25 mg  25 mg Oral TID    levoFLOXacin (LEVAQUIN) 750 mg in D5W IVPB  750 mg IntraVENous Q48H    sodium chloride (NS) flush 5-10 mL  5-10 mL IntraVENous Q8H    sodium chloride (NS) flush 5-10 mL  5-10 mL IntraVENous PRN    albuterol-ipratropium (DUO-NEB) 2.5 MG-0.5 MG/3 ML  3 mL Nebulization Q4H RT    budesonide (PULMICORT) 500 mcg/2 ml nebulizer suspension  500 mcg Nebulization BID RT    acetylcysteine (MUCOMYST) 200 mg/mL (20 %) solution 200 mg  200 mg Nebulization QID RT    hydrALAZINE (APRESOLINE) 20 mg/mL injection 10 mg  10 mg IntraVENous Q6H PRN    0.9% sodium chloride infusion 250 mL  250 mL IntraVENous PRN    sodium chloride (NS) flush 20 mL  20 mL InterCATHeter PRN    sodium chloride (NS) flush 10 mL  10 mL InterCATHeter Q24H    sodium chloride (NS) flush 10 mL  10 mL InterCATHeter PRN    sodium chloride (NS) flush 10 mL  10 mL InterCATHeter Q8H    alteplase (CATHFLO) 1 mg in sterile water (preservative free) 1 mL injection  1 mg InterCATHeter PRN    bacitracin 500 unit/gram packet 1 Packet  1 Packet Topical PRN    sodium chloride (NS) flush 10-30 mL  10-30 mL InterCATHeter PRN    sodium chloride (NS) flush 10 mL  10 mL InterCATHeter Q24H    sodium chloride (NS) flush 10 mL  10 mL InterCATHeter PRN    sodium chloride (NS) flush 10-40 mL  10-40 mL InterCATHeter Q8H    sodium chloride (NS) flush 20 mL  20 mL InterCATHeter Q24H    anastrozole (ARIMIDEX) tablet 1 mg  1 mg Oral DAILY    0.9% sodium chloride infusion 250 mL  250 mL IntraVENous PRN    epoetin celio (EPOGEN;PROCRIT) injection 10,000 Units  10,000 Units SubCUTAneous Q MON, WED & FRI    albuterol-ipratropium (DUO-NEB) 2.5 MG-0.5 MG/3 ML  3 mL Nebulization Q6H PRN    glucose chewable tablet 16 g  4 Tab Oral PRN    dextrose (D50W) injection syrg 12.5-25 g  12.5-25 g IntraVENous PRN    glucagon (GLUCAGEN) injection 1 mg  1 mg IntraMUSCular PRN    insulin lispro (HUMALOG) injection   SubCUTAneous AC&HS    prochlorperazine (COMPAZINE) with saline injection 5 mg  5 mg IntraVENous Q8H PRN    cholecalciferol (VITAMIN D3) tablet 1,000 Units  1,000 Units Oral DAILY    folic acid (FOLVITE) tablet 1 mg  1 mg Oral DAILY    magnesium oxide (MAG-OX) tablet 400 mg  400 mg Oral DAILY    therapeutic multivitamin (THERAGRAN) tablet 1 Tab  1 Tab Oral DAILY    fish oil-omega-3 fatty acids 340-1,000 mg capsule 1 Cap  1 Cap Oral DAILY    sodium chloride (NS) flush 5-10 mL  5-10 mL IntraVENous Q8H    sodium chloride (NS) flush 5-10 mL  5-10 mL IntraVENous PRN    ondansetron (ZOFRAN) injection 4 mg  4 mg IntraVENous Q4H PRN    levothyroxine (SYNTHROID) tablet 88 mcg  88 mcg Oral ACB      Allergies   Allergen Reactions    Statins-Hmg-Coa Reductase Inhibitors Other (comments)     Intolerant to statins     Sulfa (Sulfonamide Antibiotics) Other (comments)     syncope       Objective:  Vitals:    Vitals:    05/03/18 0414 05/03/18 0705 05/03/18 0757 05/03/18 0813   BP: 151/59 148/54 140/58    Pulse: 88 87 84    Resp: 21  22    Temp: 98.8 °F (37.1 °C)  97.5 °F (36.4 °C)    SpO2: 100%  100% 100%   Weight: 61.7 kg (136 lb 0.4 oz)      Height:         Intake and Output:  05/03 0701 - 05/03 1900  In: -   Out: 650 [Urine:650]  05/01 1901 - 05/03 0700  In: 950 [P.O.:600;  I.V.:350]  Out: 900 [Urine:900]    Physical Examination:    General: Alert, frail  Neck:  Supple, no mass  Resp:  Coarse breath sounds b/l  CV:  RRR, no murmur  GI:  Soft, NT, + BS  Ext;                  No LE edema  Neurologic:  Non focal   :                  Alford in place    []    High complexity decision making was performed  []    Patient is at high-risk of decompensation with multiple organ involvement    Lab Data Personally Reviewed: I have reviewed all the pertinent labs, microbiology data and radiology studies during assessment. Recent Labs      05/03/18   0446  05/02/18   0241  05/01/18   0407   NA  137  138  138   K  4.2  3.8  4.2   CL  100  100  100   CO2  29  30  31   GLU  209*  184*  225*   BUN  24*  30*  34*   CREA  0.78  1.01  1.08*   CA  9.0  9.1  9.1   MG  1.5*  1.6   --    PHOS  2.3*   --    --    ALB  1.7*  1.9*  2.2*   SGOT  36  44*  75*   ALT  19  19  26     Recent Labs      05/03/18   0446  05/02/18   0241  05/01/18   0407   WBC  16.7*  17.4*  21.1*   HGB  9.6*  9.5*  10.1*   HCT  30.4*  30.2*  32.0*   PLT  176  180  187     No results found for: SDES  Lab Results   Component Value Date/Time    Culture result: LIGHT YEAST (A) 05/01/2018 11:30 AM    Culture result: FEW APPARENT NORMAL RESPIRATORY BERYL 05/01/2018 11:30 AM    Culture result: LIGHT APPARENT CANDIDA ALBICANS (A) 04/26/2018 09:10 AM    Culture result: NO NORMAL RESPIRATORY BERYL ISOLATED 04/26/2018 09:10 AM    Culture result: MODERATE APPARENT CANDIDA ALBICANS (A) 04/25/2018 09:40 AM    Culture result: RARE NORMAL RESPIRATORY BERYL 04/25/2018 09:40 AM    Culture result: HEAVY CANDIDA ALBICANS (A) 04/25/2018 09:40 AM    Culture result:  04/25/2018 09:40 AM     CULTURE WILL BE HELD FOR 4 WEEKS. IF THERE IS ADDITIONAL FUNGAL GROWTH, A NEW REPORT WILL FOLLOW.      Recent Results (from the past 24 hour(s))   GLUCOSE, POC    Collection Time: 05/02/18 11:12 AM   Result Value Ref Range    Glucose (POC) 299 (H) 65 - 100 mg/dL    Performed by Ru Waller, POC    Collection Time: 05/02/18  4:10 PM   Result Value Ref Range    Glucose (POC) 253 (H) 65 - 100 mg/dL    Performed by Ru Waller, POC    Collection Time: 05/02/18  9:05 PM   Result Value Ref Range    Glucose (POC) 182 (H) 65 - 100 mg/dL    Performed by Merline Balboa    MAGNESIUM    Collection Time: 05/03/18  4:46 AM   Result Value Ref Range    Magnesium 1.5 (L) 1.6 - 2.4 mg/dL   METABOLIC PANEL, COMPREHENSIVE    Collection Time: 05/03/18  4:46 AM Result Value Ref Range    Sodium 137 136 - 145 mmol/L    Potassium 4.2 3.5 - 5.1 mmol/L    Chloride 100 97 - 108 mmol/L    CO2 29 21 - 32 mmol/L    Anion gap 8 5 - 15 mmol/L    Glucose 209 (H) 65 - 100 mg/dL    BUN 24 (H) 6 - 20 MG/DL    Creatinine 0.78 0.55 - 1.02 MG/DL    BUN/Creatinine ratio 31 (H) 12 - 20      GFR est AA >60 >60 ml/min/1.73m2    GFR est non-AA >60 >60 ml/min/1.73m2    Calcium 9.0 8.5 - 10.1 MG/DL    Bilirubin, total 0.5 0.2 - 1.0 MG/DL    ALT (SGPT) 19 12 - 78 U/L    AST (SGOT) 36 15 - 37 U/L    Alk. phosphatase 325 (H) 45 - 117 U/L    Protein, total 6.1 (L) 6.4 - 8.2 g/dL    Albumin 1.7 (L) 3.5 - 5.0 g/dL    Globulin 4.4 (H) 2.0 - 4.0 g/dL    A-G Ratio 0.4 (L) 1.1 - 2.2     PHOSPHORUS    Collection Time: 05/03/18  4:46 AM   Result Value Ref Range    Phosphorus 2.3 (L) 2.6 - 4.7 MG/DL   CBC WITH AUTOMATED DIFF    Collection Time: 05/03/18  4:46 AM   Result Value Ref Range    WBC 16.7 (H) 3.6 - 11.0 K/uL    RBC 3.33 (L) 3.80 - 5.20 M/uL    HGB 9.6 (L) 11.5 - 16.0 g/dL    HCT 30.4 (L) 35.0 - 47.0 %    MCV 91.3 80.0 - 99.0 FL    MCH 28.8 26.0 - 34.0 PG    MCHC 31.6 30.0 - 36.5 g/dL    RDW 22.5 (H) 11.5 - 14.5 %    PLATELET 660 799 - 212 K/uL    MPV 10.6 8.9 - 12.9 FL    NRBC 0.2 (H) 0  WBC    ABSOLUTE NRBC 0.03 (H) 0.00 - 0.01 K/uL    NEUTROPHILS 83 (H) 32 - 75 %    LYMPHOCYTES 7 (L) 12 - 49 %    MONOCYTES 9 5 - 13 %    EOSINOPHILS 0 0 - 7 %    BASOPHILS 0 0 - 1 %    IMMATURE GRANULOCYTES 1 (H) 0.0 - 0.5 %    ABS. NEUTROPHILS 13.8 (H) 1.8 - 8.0 K/UL    ABS. LYMPHOCYTES 1.2 0.8 - 3.5 K/UL    ABS. MONOCYTES 1.5 (H) 0.0 - 1.0 K/UL    ABS. EOSINOPHILS 0.0 0.0 - 0.4 K/UL    ABS. BASOPHILS 0.0 0.0 - 0.1 K/UL    ABS. IMM.  GRANS. 0.2 (H) 0.00 - 0.04 K/UL    DF SMEAR SCANNED      PLATELET COMMENTS Large Platelets      RBC COMMENTS ANISOCYTOSIS  2+        RBC COMMENTS POLYCHROMASIA  PRESENT        RBC COMMENTS OVALOCYTES  PRESENT        WBC COMMENTS HYPERSEGMENTED POLYS     GLUCOSE, POC Collection Time: 05/03/18  6:28 AM   Result Value Ref Range    Glucose (POC) 211 (H) 65 - 100 mg/dL    Performed by [employer]        I have reviewed the flowsheets. Chart and Pertinent Notes have been reviewed. No change in PMH ,family and social history from Consult note.       Carolina Sousa MD

## 2018-05-03 NOTE — PROGRESS NOTES
Cardiology Progress Note            Admit Date: 4/16/2018  Admit Diagnosis: SOB (shortness of breath)  colon  Anemia  COLON  Date: 5/3/2018     Time: 2:01 PM    Subjective: Transferred to floor. Remains off biPAP. Still with some SOB but overall feels a little better. Denies chest pain. Son at bedside asking if [address] now a consideration since pt with some improvement. Assessment and Plan     1. NSTEMI:     -No chest pain. -Repeat TTE:4/27/18  EF unchanged 45% with mild hypokinesis  basal-mid anteroseptal and apical walls.   -Held ASA and Plavix on 4/26 d/t worsened anemia. ASA restarted. Continue off Plavix   -Continue coreg 6.25 mg BID   -Statin intolerant due to weakness on zocor, on repatha, last LDL 31, so will not rechallenge statin. On fish-oil. -D/W son- might consider [facility] next week if pt continues to improve. 2. Cardiomyopathy/Acute HFrEF, new:  EF 45% NYHA IV     -Primary team Restarted low dose ace-I as creatinine normalized. -Coreg to 6.25 mg BID today.   -Daily weights, I/Os (net +440mls/24 hours, overall 8.8 Liters negative). -Continue aldactone. 3. JEROD on CKD:   Creatinine normalized. GFR >60   -Nephrology following. 4. Acute hypoxic respiratory failure:multifactorial.  On 2 L NC. today   -difficulty managing secretions   -Spironolactone   -prn loop diuretics     5. Anemia:hgb 9.6 On procrit. 6. HTN- BP labile.   -Coreg to 6.25 mg BID (was increased 5/1 PM)   -Hydralazine 25 mg TID   -Low dose ACE-I    7. Leukocytosis/Ecoli UTI/PNA     8. Hx of Colon cancer, now metastatic breast ca (bone, liver,calvarium mets) on Arimedex  -Hematology oncology following. 9. Advanced directives:  No shock or CPR, DNR Palliative care following. 10. Deconditioned:  Very weak. Will need PT and rehab-     Pt transferred out of unit overnight, with some improvement.   Dr. Cruzito Pena d/w son- if pt continues to make improvement, might consider [facility] next week. Tina Cee. MARIETTA [place] 0900  Cardiology Attending:Patient seen and examined. I agree with NP assessment and plans. Consider cath next week. Suzanne Thomas MD 5/3/2018 4:24 PM           Objective:      Physical Exam:                Visit Vitals    /44 (BP 1 Location: Left arm, BP Patient Position: At rest)    Pulse 86    Temp 98.5 °F (36.9 °C)    Resp 19    Ht 5' 1\" (1.549 m)    Wt 61.7 kg (136 lb 0.4 oz)    SpO2 98%    BMI 25.7 kg/m2          General Appearance:   elderly female, NAD, on Bipap   Ears/Nose/Mouth/Throat:    Hearing grossly normal.         Neck:  Supple. Chest:     Course crackles bilaterally. Decreased breath sounds on left. On 2 L NC,    Cardiovascular:   Regular rate and rhythm, S1, S2 normal, no murmur   Abdomen:    Soft, nontender, no gaurding. Extremities:  No edema bilaterally. Skin:  Warm and dry.      Telemetry: Sinus rhythm- sinus tachycardia          Data Review:    Labs:    Recent Results (from the past 24 hour(s))   GLUCOSE, POC    Collection Time: 05/02/18  4:10 PM   Result Value Ref Range    Glucose (POC) 253 (H) 65 - 100 mg/dL    Performed by [facility] FILI    GLUCOSE, POC    Collection Time: 05/02/18  9:05 PM   Result Value Ref Range    Glucose (POC) 182 (H) 65 - 100 mg/dL    Performed by Marleny Chung    MAGNESIUM    Collection Time: 05/03/18  4:46 AM   Result Value Ref Range    Magnesium 1.5 (L) 1.6 - 2.4 mg/dL   METABOLIC PANEL, COMPREHENSIVE    Collection Time: 05/03/18  4:46 AM   Result Value Ref Range    Sodium 137 136 - 145 mmol/L    Potassium 4.2 3.5 - 5.1 mmol/L    Chloride 100 97 - 108 mmol/L    CO2 29 21 - 32 mmol/L    Anion gap 8 5 - 15 mmol/L    Glucose 209 (H) 65 - 100 mg/dL    BUN 24 (H) 6 - 20 MG/DL    Creatinine 0.78 0.55 - 1.02 MG/DL    BUN/Creatinine ratio 31 (H) 12 - 20      GFR est AA >60 >60 ml/min/1.73m2    GFR est non-AA >60 >60 ml/min/1.73m2    Calcium 9.0 8.5 - 10.1 MG/DL Bilirubin, total 0.5 0.2 - 1.0 MG/DL    ALT (SGPT) 19 12 - 78 U/L    AST (SGOT) 36 15 - 37 U/L    Alk. phosphatase 325 (H) 45 - 117 U/L    Protein, total 6.1 (L) 6.4 - 8.2 g/dL    Albumin 1.7 (L) 3.5 - 5.0 g/dL    Globulin 4.4 (H) 2.0 - 4.0 g/dL    A-G Ratio 0.4 (L) 1.1 - 2.2     PHOSPHORUS    Collection Time: 05/03/18  4:46 AM   Result Value Ref Range    Phosphorus 2.3 (L) 2.6 - 4.7 MG/DL   CBC WITH AUTOMATED DIFF    Collection Time: 05/03/18  4:46 AM   Result Value Ref Range    WBC 16.7 (H) 3.6 - 11.0 K/uL    RBC 3.33 (L) 3.80 - 5.20 M/uL    HGB 9.6 (L) 11.5 - 16.0 g/dL    HCT 30.4 (L) 35.0 - 47.0 %    MCV 91.3 80.0 - 99.0 FL    MCH 28.8 26.0 - 34.0 PG    MCHC 31.6 30.0 - 36.5 g/dL    RDW 22.5 (H) 11.5 - 14.5 %    PLATELET 539 468 - 098 K/uL    MPV 10.6 8.9 - 12.9 FL    NRBC 0.2 (H) 0  WBC    ABSOLUTE NRBC 0.03 (H) 0.00 - 0.01 K/uL    NEUTROPHILS 83 (H) 32 - 75 %    LYMPHOCYTES 7 (L) 12 - 49 %    MONOCYTES 9 5 - 13 %    EOSINOPHILS 0 0 - 7 %    BASOPHILS 0 0 - 1 %    IMMATURE GRANULOCYTES 1 (H) 0.0 - 0.5 %    ABS. NEUTROPHILS 13.8 (H) 1.8 - 8.0 K/UL    ABS. LYMPHOCYTES 1.2 0.8 - 3.5 K/UL    ABS. MONOCYTES 1.5 (H) 0.0 - 1.0 K/UL    ABS. EOSINOPHILS 0.0 0.0 - 0.4 K/UL    ABS. BASOPHILS 0.0 0.0 - 0.1 K/UL    ABS. IMM.  GRANS. 0.2 (H) 0.00 - 0.04 K/UL    DF SMEAR SCANNED      PLATELET COMMENTS Large Platelets      RBC COMMENTS ANISOCYTOSIS  2+        RBC COMMENTS POLYCHROMASIA  PRESENT        RBC COMMENTS OVALOCYTES  PRESENT        WBC COMMENTS HYPERSEGMENTED POLYS     GLUCOSE, POC    Collection Time: 05/03/18  6:28 AM   Result Value Ref Range    Glucose (POC) 211 (H) 65 - 100 mg/dL    Performed by [address] [place] [address], POC    Collection Time: 05/03/18 12:08 PM   Result Value Ref Range    Glucose (POC) 242 (H) 65 - 100 mg/dL    Performed by Kathleen Conde           Radiology:        Current Facility-Administered Medications   Medication Dose Route Frequency    lisinopril (PRINIVIL, ZESTRIL) tablet 5 mg  5 mg Oral DAILY    albuterol-ipratropium (DUO-NEB) 2.5 MG-0.5 MG/3 ML  3 mL Nebulization Q6H RT    insulin glargine (LANTUS) injection 8 Units  8 Units SubCUTAneous DAILY    acetylcysteine (MUCOMYST) 200 mg/mL (20 %) solution 200 mg  200 mg Nebulization TID RT    ascorbic acid (vitamin C) (VITAMIN C) tablet 1,000 mg  1,000 mg Oral DAILY    polyethylene glycol (MIRALAX) packet 17 g  17 g Oral DAILY    naloxone (NARCAN) injection 0.4 mg  0.4 mg IntraVENous Multiple    flumazenil (ROMAZICON) 0.1 mg/mL injection 0.2 mg  0.2 mg IntraVENous Multiple    midazolam (VERSED) injection 0.25-5 mg  0.25-5 mg IntraVENous Multiple    carvedilol (COREG) tablet 6.25 mg  6.25 mg Oral BID WITH MEALS    meropenem (MERREM) 500 mg in 0.9% sodium chloride (MBP/ADV) 50 mL  0.5 g IntraVENous Q8H    fluconazole (DIFLUCAN) 200mg/100 mL IVPB (premix)  200 mg IntraVENous Q24H    aspirin chewable tablet 81 mg  81 mg Oral DAILY    spironolactone (ALDACTONE) tablet 25 mg  25 mg Oral DAILY    acetaminophen (TYLENOL) suppository 650 mg  650 mg Rectal Q4H PRN    hydrALAZINE (APRESOLINE) tablet 25 mg  25 mg Oral TID    levoFLOXacin (LEVAQUIN) 750 mg in D5W IVPB  750 mg IntraVENous Q48H    sodium chloride (NS) flush 5-10 mL  5-10 mL IntraVENous Q8H    sodium chloride (NS) flush 5-10 mL  5-10 mL IntraVENous PRN    budesonide (PULMICORT) 500 mcg/2 ml nebulizer suspension  500 mcg Nebulization BID RT    hydrALAZINE (APRESOLINE) 20 mg/mL injection 10 mg  10 mg IntraVENous Q6H PRN    0.9% sodium chloride infusion 250 mL  250 mL IntraVENous PRN    sodium chloride (NS) flush 20 mL  20 mL InterCATHeter PRN    sodium chloride (NS) flush 10 mL  10 mL InterCATHeter Q24H    sodium chloride (NS) flush 10 mL  10 mL InterCATHeter PRN    sodium chloride (NS) flush 10 mL  10 mL InterCATHeter Q8H    alteplase (CATHFLO) 1 mg in sterile water (preservative free) 1 mL injection  1 mg InterCATHeter PRN    bacitracin 500 unit/gram packet 1 Packet  1 Packet Topical PRN    sodium chloride (NS) flush 10-30 mL  10-30 mL InterCATHeter PRN    sodium chloride (NS) flush 10 mL  10 mL InterCATHeter Q24H    sodium chloride (NS) flush 10 mL  10 mL InterCATHeter PRN    sodium chloride (NS) flush 10-40 mL  10-40 mL InterCATHeter Q8H    sodium chloride (NS) flush 20 mL  20 mL InterCATHeter Q24H    anastrozole (ARIMIDEX) tablet 1 mg  1 mg Oral DAILY    0.9% sodium chloride infusion 250 mL  250 mL IntraVENous PRN    epoetin celio (EPOGEN;PROCRIT) injection 10,000 Units  10,000 Units SubCUTAneous Q MON, WED & FRI    albuterol-ipratropium (DUO-NEB) 2.5 MG-0.5 MG/3 ML  3 mL Nebulization Q6H PRN    glucose chewable tablet 16 g  4 Tab Oral PRN    dextrose (D50W) injection syrg 12.5-25 g  12.5-25 g IntraVENous PRN    glucagon (GLUCAGEN) injection 1 mg  1 mg IntraMUSCular PRN    insulin lispro (HUMALOG) injection   SubCUTAneous AC&HS    prochlorperazine (COMPAZINE) with saline injection 5 mg  5 mg IntraVENous Q8H PRN    cholecalciferol (VITAMIN D3) tablet 1,000 Units  1,000 Units Oral DAILY    folic acid (FOLVITE) tablet 1 mg  1 mg Oral DAILY    magnesium oxide (MAG-OX) tablet 400 mg  400 mg Oral DAILY    therapeutic multivitamin (THERAGRAN) tablet 1 Tab  1 Tab Oral DAILY    fish oil-omega-3 fatty acids 340-1,000 mg capsule 1 Cap  1 Cap Oral DAILY    sodium chloride (NS) flush 5-10 mL  5-10 mL IntraVENous Q8H    sodium chloride (NS) flush 5-10 mL  5-10 mL IntraVENous PRN    ondansetron (ZOFRAN) injection 4 mg  4 mg IntraVENous Q4H PRN    levothyroxine (SYNTHROID) tablet 88 mcg  88 mcg Oral ACB     Pt critically ill, poor prognosis, multiple severe issues. Ingrid Miranda.  MARIET[place]     [facility] [address] [place] [address], [address] [place] [address] [address]   [address] [facility]   [phone]

## 2018-05-03 NOTE — PROGRESS NOTES
Hospitalist Progress Note  Lana Ramirez MD  Answering service: [phone] OR [phone] from in house phone        Date of Service:  5/3/2018  NAME:  Samira Orozco  :  1935  MRN:  [id number]      Admission Summary:   The patient is an 82-year-old female with history of diabetes and rheumatoid arthritis who initially presented to the emergency room via EMS with complaints of shortness of breath    Interval history / Subjective:   Seen in IMCU #416  Awaken from sleep. She is doing better,offered no complaints. Discussed with RN,pt did not need BIPAP last night. Assessment & Plan:     Acute on chronic hypoxic/hypercarbic respiratory failure due to pulmonary edema, pneumonia  -s/p bronchoscopy on  and on  suctioned and cleared  -respiratory culture grow on  and  grow candida albicans, on diflucan  -,resp distress and CXR worsening worsening of near complete left hemithorax opacification.  -On and off BIPAP  -S/p bronch and clearing of secretion on   -CXR improving left pleural effusion and aeration    Acute encephalopathy possible due to metabolic. Resolved  -CT head on  volume loss and minimal white matter disease. No acute intracranial [place], Sinus mucosal inflammatory change  -MRI of brain  Study limited by motion artifact, volume loss and white matter disease. No definite intracranial metastasis however no intravenous contrast was administered due to poor renal function. 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 Sepsis secondary to E Coli UTI, pneumonia  POA  -urine cx   grow e coli, pseudomanas sp  -blood cx on ,  and  no growth  Antinfectives  -Ceftriaxone 1 dose on   -Diflucan -present  -Levaquin -present  -Meropenem -present  -Zosyn -  -Vancomycin -    NSTEMI  - on aspirin,coreg. Statin intolerant,she is on fish oil. She was getting Evolocumab injections out patient.  -cardiologist on board,possible cardiac cath next week    Acute systolic CHF NYHA Class IV  -Intermittent diuresis,on hydralazine, spironolactone,coreg   -BP is high and renal function has improved and stabilized. Start low dose lisinopril    JEROD on CKD stage III,creatinien improved  -creatinine improved and stabilized. -bumex is stopped on 4/28  -nephrologist on board    Metastatic left breast cancer with diffuse bone mets.+er,+Pr  -on armidex   -Hem/Onc on board    HTN  -BP stablecon hydralazine, coreg and spironolactone,  monitor BP. Added low dose lisinopril 5/3    DM-II  -Blood glucose 170-130s. She off the oral medications(Glimepiride,sitagliptin and metformin) she was taking PTA  -Add low dose Lantus 8 units sc daily,continue insulin sliding scale, monitor finger stick glucose    Hypothyroidism  -continue synthroid    Hx of GERD  -continue pepcid    Elevated liver enzyme   -possible related to liver lesion, monitor LFT    Anemia multifactorial  -Hgb 6.7, s/p 2 units pRBC on 4/26,   -Hgb 9.3, monitor H/H    Hx of colon cancer 25 years ago  -according to son, there is a work-up planned with [facility] regarding the possibility of recurrent CA (based on lesions seen in the calvarium on MRI - 3/19). -hem/onc on board    H/o stroke with residual weakness on the left    Diet,suspect silent aspiration. Speech recommended:  --Continue regular/thin liquid diet, with son cutting up solids as needed (patient's baseline due to dentition)  --NO STRAWS  --Upright for all PO intake  --MBS showed no evidence of  Aspiration    Hypomagnesemia: iv mg today,on oral magnesium. H/O Seropositive RA of multiple sites,stable. -Apparently she was getting Etanercept sc injections out patient. Debility: PT/OT requested. She likely needs rehab on discharge        Code status: DNR  DVT prophylaxis:SCD    Care Plan discussed with: Patient/Family and Nurse  Disposition:in ICU    5/1,updated son at bedside and questions answered  Contact sonRubi Favorite . Hospital Problems  Date Reviewed: 4/16/2018          Codes Class Noted POA    Acute systolic heart failure ([facility] [address] ICD-10-CM: I50.21  ICD-9-CM: 428.21  4/24/2018 Unknown        Altered mental status, unspecified ICD-10-CM: R41.82  ICD-9-CM: 780.97  4/23/2018 Unknown        * (Principal)SOB (shortness of breath) ICD-10-CM: R06.02  ICD-9-CM: 786.05  4/16/2018 Unknown                Vital Signs:    Last 24hrs VS reviewed since prior progress note. Most recent are:  Visit Vitals    /58 (BP 1 Location: Left arm, BP Patient Position: At rest)    Pulse 84    Temp 97.5 °F (36.4 °C)    Resp 22    Ht 5' 1\" (1.549 m)    Wt 61.7 kg (136 lb 0.4 oz)    SpO2 100%    BMI 25.7 kg/m2         Intake/Output Summary (Last 24 hours) at 05/03/18 1038  Last data filed at 05/03/18 0850   Gross per 24 hour   Intake              730 ml   Output             1050 ml   Net             -320 ml        Physical Examination:             Constitutional:  Alert and conversant,on nasal canula   ENT:  Oral mucous moist, oropharynx benign. Neck supple,    Resp:  Decrease bronchial breath sound, few wheezing and rhonic bilaterally. No accessory muscle use   CV:  Regular rhythm, normal rate, no murmurs, gallops, rubs    GI:  Soft, non distended, non tender. normoactive bowel sounds, no hepatosplenomegaly     Musculoskeletal:  No edema    Neurologic:  Conscious and alert, oriented to place and person, answer questions, moves all extremities. Left side weak     Skin:  Good turgor, no rashes or ulcers       Data Review:    Review and/or order of clinical lab test      Labs:     Recent Labs      05/03/18   0446  05/02/18   0241   WBC  16.7*  17.4*   HGB  9.6*  9.5*   HCT  30.4*  30.2*   PLT  176  180     Recent Labs      05/03/18   0446  05/02/18   0241  05/01/18   0407  04/30/18   1046   NA  137  138  138  139   K  4.2  3.8  4.2  4.6   CL  100  100  100  102   CO2  29  30  31  31   BUN 24*  30*  34*  39*   CREA  0.78  1.01  1.08*  1.21*   GLU  209*  184*  225*  237*   CA  9.0  9.1  9.1  8.7   MG  1.5*  1.6   --   1.8   PHOS  2.3*   --    --   2.6     Recent Labs      05/03/18   0446  05/02/18   0241  05/01/18   0407   SGOT  36  44*  75*   ALT  19  19  26   AP  325*  386*  484*   TBILI  0.5  0.6  0.6   TP  6.1*  6.2*  6.7   ALB  1.7*  1.9*  2.2*   GLOB  4.4*  4.3*  4.5*     No results for input(s): INR, PTP, APTT in the last 72 hours. No lab exists for component: INREXT, INREXT   No results for input(s): FE, TIBC, PSAT, FERR in the last 72 hours. No results found for: FOL, RBCF   No results for input(s): PH, PCO2, PO2 in the last 72 hours. No results for input(s): CPK, CKNDX, TROIQ in the last 72 hours.     No lab exists for component: CPKMB  Lab Results   Component Value Date/Time    Cholesterol, total 74 04/16/2018 04:21 AM    HDL Cholesterol 25 04/16/2018 04:21 AM    LDL, calculated 31.6 04/16/2018 04:21 AM    Triglyceride 87 04/16/2018 04:21 AM    CHOL/HDL Ratio 3.0 04/16/2018 04:21 AM     Lab Results   Component Value Date/Time    Glucose (POC) 211 (H) 05/03/2018 06:28 AM    Glucose (POC) 182 (H) 05/02/2018 09:05 PM    Glucose (POC) 253 (H) 05/02/2018 04:10 PM    Glucose (POC) 299 (H) 05/02/2018 11:12 AM    Glucose (POC) 178 (H) 05/02/2018 06:27 AM     Lab Results   Component Value Date/Time    Color YELLOW/STRAW 04/20/2018 04:30 AM    Appearance TURBID (A) 04/20/2018 04:30 AM    Specific gravity 1.029 04/20/2018 04:30 AM    pH (UA) 5.0 04/20/2018 04:30 AM    Protein 100 (A) 04/20/2018 04:30 AM    Glucose NEGATIVE  04/20/2018 04:30 AM    Ketone NEGATIVE  04/20/2018 04:30 AM    Bilirubin NEGATIVE  04/20/2018 04:30 AM    Urobilinogen 0.2 04/20/2018 04:30 AM    Nitrites NEGATIVE  04/20/2018 04:30 AM    Leukocyte Esterase LARGE (A) 04/20/2018 04:30 AM    Epithelial cells FEW 04/20/2018 04:30 AM    Bacteria 3+ (A) 04/20/2018 04:30 AM    WBC >100 (H) 04/20/2018 04:30 AM    RBC 5-10 04/20/2018 04:30 AM         Medications Reviewed:     Current Facility-Administered Medications   Medication Dose Route Frequency    magnesium sulfate 2 g/50 ml IVPB (premix or compounded)  2 g IntraVENous ONCE    ascorbic acid (vitamin C) (VITAMIN C) tablet 1,000 mg  1,000 mg Oral DAILY    polyethylene glycol (MIRALAX) packet 17 g  17 g Oral DAILY    naloxone (NARCAN) injection 0.4 mg  0.4 mg IntraVENous Multiple    flumazenil (ROMAZICON) 0.1 mg/mL injection 0.2 mg  0.2 mg IntraVENous Multiple    midazolam (VERSED) injection 0.25-5 mg  0.25-5 mg IntraVENous Multiple    carvedilol (COREG) tablet 6.25 mg  6.25 mg Oral BID WITH MEALS    meropenem (MERREM) 500 mg in 0.9% sodium chloride (MBP/ADV) 50 mL  0.5 g IntraVENous Q8H    fluconazole (DIFLUCAN) 200mg/100 mL IVPB (premix)  200 mg IntraVENous Q24H    aspirin chewable tablet 81 mg  81 mg Oral DAILY    spironolactone (ALDACTONE) tablet 25 mg  25 mg Oral DAILY    acetaminophen (TYLENOL) suppository 650 mg  650 mg Rectal Q4H PRN    hydrALAZINE (APRESOLINE) tablet 25 mg  25 mg Oral TID    levoFLOXacin (LEVAQUIN) 750 mg in D5W IVPB  750 mg IntraVENous Q48H    sodium chloride (NS) flush 5-10 mL  5-10 mL IntraVENous Q8H    sodium chloride (NS) flush 5-10 mL  5-10 mL IntraVENous PRN    albuterol-ipratropium (DUO-NEB) 2.5 MG-0.5 MG/3 ML  3 mL Nebulization Q4H RT    budesonide (PULMICORT) 500 mcg/2 ml nebulizer suspension  500 mcg Nebulization BID RT    acetylcysteine (MUCOMYST) 200 mg/mL (20 %) solution 200 mg  200 mg Nebulization QID RT    hydrALAZINE (APRESOLINE) 20 mg/mL injection 10 mg  10 mg IntraVENous Q6H PRN    0.9% sodium chloride infusion 250 mL  250 mL IntraVENous PRN    sodium chloride (NS) flush 20 mL  20 mL InterCATHeter PRN    sodium chloride (NS) flush 10 mL  10 mL InterCATHeter Q24H    sodium chloride (NS) flush 10 mL  10 mL InterCATHeter PRN    sodium chloride (NS) flush 10 mL  10 mL InterCATHeter Q8H    alteplase (CATHFLO) 1 mg in sterile water (preservative free) 1 mL injection  1 mg InterCATHeter PRN    bacitracin 500 unit/gram packet 1 Packet  1 Packet Topical PRN    sodium chloride (NS) flush 10-30 mL  10-30 mL InterCATHeter PRN    sodium chloride (NS) flush 10 mL  10 mL InterCATHeter Q24H    sodium chloride (NS) flush 10 mL  10 mL InterCATHeter PRN    sodium chloride (NS) flush 10-40 mL  10-40 mL InterCATHeter Q8H    sodium chloride (NS) flush 20 mL  20 mL InterCATHeter Q24H    anastrozole (ARIMIDEX) tablet 1 mg  1 mg Oral DAILY    0.9% sodium chloride infusion 250 mL  250 mL IntraVENous PRN    epoetin celio (EPOGEN;PROCRIT) injection 10,000 Units  10,000 Units SubCUTAneous Q MON, WED & FRI    albuterol-ipratropium (DUO-NEB) 2.5 MG-0.5 MG/3 ML  3 mL Nebulization Q6H PRN    glucose chewable tablet 16 g  4 Tab Oral PRN    dextrose (D50W) injection syrg 12.5-25 g  12.5-25 g IntraVENous PRN    glucagon (GLUCAGEN) injection 1 mg  1 mg IntraMUSCular PRN    insulin lispro (HUMALOG) injection   SubCUTAneous AC&HS    prochlorperazine (COMPAZINE) with saline injection 5 mg  5 mg IntraVENous Q8H PRN    cholecalciferol (VITAMIN D3) tablet 1,000 Units  1,000 Units Oral DAILY    folic acid (FOLVITE) tablet 1 mg  1 mg Oral DAILY    magnesium oxide (MAG-OX) tablet 400 mg  400 mg Oral DAILY    therapeutic multivitamin (THERAGRAN) tablet 1 Tab  1 Tab Oral DAILY    fish oil-omega-3 fatty acids 340-1,000 mg capsule 1 Cap  1 Cap Oral DAILY    sodium chloride (NS) flush 5-10 mL  5-10 mL IntraVENous Q8H    sodium chloride (NS) flush 5-10 mL  5-10 mL IntraVENous PRN    ondansetron (ZOFRAN) injection 4 mg  4 mg IntraVENous Q4H PRN    levothyroxine (SYNTHROID) tablet 88 mcg  88 mcg Oral ACB     ______________________________________________________________________  EXPECTED LENGTH OF STAY: 4d 21h  ACTUAL LENGTH OF STAY:          17                 Bill Deras MD

## 2018-05-03 NOTE — PROGRESS NOTES
Primary Nurse Myrna Cisneros and Shruti Guzman RN performed a dual skin assessment on this patient. The following impairment was noted:    Scattered bruising  Gluteal fold tear      Calderon score is 12      2012-Bedside and Verbal shift change report given to Shruti Guzman RN (oncoming nurse) by Alba Sloan RN (offgoing nurse). Report included the following information SBAR, Kardex, Intake/Output, MAR and Recent Results.

## 2018-05-03 NOTE — PROGRESS NOTES
Pulmonary, Critical Care, and Sleep Medicine~Progress Note    Name: Evelyne Ott MRN: [id number]   : 1935 Hospital: [facility]    Date: 5/3/2018 11:37 AM Admission: 2018      Impression Plan   1. Metastatic breast cancer- new dx- left breast mass on MRI with diffuse skeletal mets. Follows with Dr. Joyce Miranda  2. Acute hypercarbic/hypoxic resp failure-pCO2 ok range  3. Altered mental status  4. NSTEMI - ECHO EF 40%  5. JEROD on CKD  6. GERD  7. E Coli UTI 1. O2 titration above 90%,    2. PT/OT  3. Noted SLP's plan   4. Hemodynamically stable  5. diuresis  6. BP control   7. Correct electrolytes as needed   8. ABX per ID  9. Patient followed by multiple specialties   10. NIV as needed   11. Strong pulmonary toilet, including chest PT  12. discussed with attending  13. We will be available again to see if needed      Daily Progression:    5/3  Feeling better. No acute dyspnea. Alert and answering questions appropriately.  chest film looks better     :   feels better today after bronchoscopy, but still quite coarse. WBC down, creatinine better, cultures from yesterday are pending. MBS was normal.  Noted poor cough reflex. In bed without distress. On NC.      :  Looked comfortable this morning. Noted events overnight. Chest film with left opacification, received lasix last night     I have reviewed the labs and previous days notes. Review of systems not obtained due to patient factors.     OBJECTIVE:     Vital Signs:       Visit Vitals    /58 (BP 1 Location: Left arm, BP Patient Position: At rest)    Pulse 84    Temp 97.5 °F (36.4 °C)    Resp 22    Ht 5' 1\" (1.549 m)    Wt 61.7 kg (136 lb 0.4 oz)    SpO2 100%    BMI 25.7 kg/m2      Temp (24hrs), Av.3 °F (36.8 °C), Min:97.4 °F (36.3 °C), Max:99 °F (37.2 °C)     Intake/Output:     Last shift:  0701 -  1900  In: -   Out: 650 [Urine:650]    Last 3 shifts: 05/01 1901 - 05/03 0700  In: 950 [P.O.:600;  I.V.:350]  Out: 900 [Urine:900]          Intake/Output Summary (Last 24 hours) at 05/03/18 0905  Last data filed at 05/03/18 0850   Gross per 24 hour   Intake              780 ml   Output             1050 ml   Net             -270 ml       Physical Exam:                                        Exam Findings Other   General: No resp distress noted, appears stated age    [de-identified]:  No ulcers, JVD not elevated, no cervical LAD    Chest: No pectus deformity, normal chest rise b/l    HEART:  RRR, no murmurs/rubs/gallops    Lungs:  Coarse     ABD: Soft/NT, non rigid mildly distended    EXT: No cyanosis/clubbing/edema, normal peripheral pulses    Skin: No rashes or ulcers, no mottling    Neuro: Alert and answers simple questions         Medications:  Current Facility-Administered Medications   Medication Dose Route Frequency    magnesium sulfate 2 g/50 ml IVPB (premix or compounded)  2 g IntraVENous ONCE    ascorbic acid (vitamin C) (VITAMIN C) tablet 1,000 mg  1,000 mg Oral DAILY    polyethylene glycol (MIRALAX) packet 17 g  17 g Oral DAILY    naloxone (NARCAN) injection 0.4 mg  0.4 mg IntraVENous Multiple    flumazenil (ROMAZICON) 0.1 mg/mL injection 0.2 mg  0.2 mg IntraVENous Multiple    midazolam (VERSED) injection 0.25-5 mg  0.25-5 mg IntraVENous Multiple    carvedilol (COREG) tablet 6.25 mg  6.25 mg Oral BID WITH MEALS    meropenem (MERREM) 500 mg in 0.9% sodium chloride (MBP/ADV) 50 mL  0.5 g IntraVENous Q8H    fluconazole (DIFLUCAN) 200mg/100 mL IVPB (premix)  200 mg IntraVENous Q24H    aspirin chewable tablet 81 mg  81 mg Oral DAILY    spironolactone (ALDACTONE) tablet 25 mg  25 mg Oral DAILY    acetaminophen (TYLENOL) suppository 650 mg  650 mg Rectal Q4H PRN    hydrALAZINE (APRESOLINE) tablet 25 mg  25 mg Oral TID    levoFLOXacin (LEVAQUIN) 750 mg in D5W IVPB  750 mg IntraVENous Q48H    sodium chloride (NS) flush 5-10 mL  5-10 mL IntraVENous Q8H    sodium chloride (NS) flush 5-10 mL  5-10 mL IntraVENous PRN    albuterol-ipratropium (DUO-NEB) 2.5 MG-0.5 MG/3 ML  3 mL Nebulization Q4H RT    budesonide (PULMICORT) 500 mcg/2 ml nebulizer suspension  500 mcg Nebulization BID RT    acetylcysteine (MUCOMYST) 200 mg/mL (20 %) solution 200 mg  200 mg Nebulization QID RT    hydrALAZINE (APRESOLINE) 20 mg/mL injection 10 mg  10 mg IntraVENous Q6H PRN    0.9% sodium chloride infusion 250 mL  250 mL IntraVENous PRN    sodium chloride (NS) flush 20 mL  20 mL InterCATHeter PRN    sodium chloride (NS) flush 10 mL  10 mL InterCATHeter Q24H    sodium chloride (NS) flush 10 mL  10 mL InterCATHeter PRN    sodium chloride (NS) flush 10 mL  10 mL InterCATHeter Q8H    alteplase (CATHFLO) 1 mg in sterile water (preservative free) 1 mL injection  1 mg InterCATHeter PRN    bacitracin 500 unit/gram packet 1 Packet  1 Packet Topical PRN    sodium chloride (NS) flush 10-30 mL  10-30 mL InterCATHeter PRN    sodium chloride (NS) flush 10 mL  10 mL InterCATHeter Q24H    sodium chloride (NS) flush 10 mL  10 mL InterCATHeter PRN    sodium chloride (NS) flush 10-40 mL  10-40 mL InterCATHeter Q8H    sodium chloride (NS) flush 20 mL  20 mL InterCATHeter Q24H    anastrozole (ARIMIDEX) tablet 1 mg  1 mg Oral DAILY    0.9% sodium chloride infusion 250 mL  250 mL IntraVENous PRN    epoetin celio (EPOGEN;PROCRIT) injection 10,000 Units  10,000 Units SubCUTAneous Q MON, WED & FRI    albuterol-ipratropium (DUO-NEB) 2.5 MG-0.5 MG/3 ML  3 mL Nebulization Q6H PRN    glucose chewable tablet 16 g  4 Tab Oral PRN    dextrose (D50W) injection syrg 12.5-25 g  12.5-25 g IntraVENous PRN    glucagon (GLUCAGEN) injection 1 mg  1 mg IntraMUSCular PRN    insulin lispro (HUMALOG) injection   SubCUTAneous AC&HS    prochlorperazine (COMPAZINE) with saline injection 5 mg  5 mg IntraVENous Q8H PRN    cholecalciferol (VITAMIN D3) tablet 1,000 Units  1,000 Units Oral DAILY    folic acid (FOLVITE) tablet 1 mg  1 mg Oral DAILY    magnesium oxide (MAG-OX) tablet 400 mg  400 mg Oral DAILY    therapeutic multivitamin (THERAGRAN) tablet 1 Tab  1 Tab Oral DAILY    fish oil-omega-3 fatty acids 340-1,000 mg capsule 1 Cap  1 Cap Oral DAILY    sodium chloride (NS) flush 5-10 mL  5-10 mL IntraVENous Q8H    sodium chloride (NS) flush 5-10 mL  5-10 mL IntraVENous PRN    ondansetron (ZOFRAN) injection 4 mg  4 mg IntraVENous Q4H PRN    levothyroxine (SYNTHROID) tablet 88 mcg  88 mcg Oral ACB       Labs:  ABG No results for input(s): PHI, PCO2I, PO2I, HCO3I, SO2I, FIO2I in the last 72 hours.      CBC Recent Labs      05/03/18   0446  05/02/18   0241  05/01/18   0407   WBC  16.7*  17.4*  21.1*   HGB  9.6*  9.5*  10.1*   HCT  30.4*  30.2*  32.0*   PLT  176  180  187   MCV  91.3  91.2  92.5   MCH  28.8  28.7  41.1        Metabolic  Panel Recent Labs      05/03/18   0446  05/02/18   0241  05/01/18   0407  04/30/18   1046   NA  137  138  138  139   K  4.2  3.8  4.2  4.6   CL  100  100  100  102   CO2  29  30  31  31   GLU  209*  184*  225*  237*   BUN  24*  30*  34*  39*   CREA  0.78  1.01  1.08*  1.21*   CA  9.0  9.1  9.1  8.7   MG  1.5*  1.6   --   1.8   PHOS  2.3*   --    --   2.6   ALB  1.7*  1.9*  2.2*  2.3*   SGOT  36  44*  75*   --    ALT  19  19 26   --         Pertinent Labs                JEAN PAUL Anthony  5/3/2018

## 2018-05-03 NOTE — DIABETES MGMT
DTC Progress Note    Recommendations/ Comments: Chart reviewed due to hyperglycemia. Blood sugars now mostly >200 mg/dl   PO intake low  Pt is a DNR; acceptable to liberalize BG goals    Noted addition of Lantus 8 units starting today     Current hospital DM medication: Lantus 8 units daily and correction scale Humalog, normal sensitivity.      Chart reviewed on Sandy Ledesma.     Patient is a 82 y.o. female with known Type 2 Diabetes on Januvia 50 mg/d; Amaryl 1 mg/d; Metformin 1000 mg BID at home. A1c:   Lab Results   Component Value Date/Time    Hemoglobin A1c 7.0 (H) 04/18/2018 08:14 AM    Hemoglobin A1c 6.9 (H) 11/30/2011 08:30 AM       Recent Glucose Results:   Lab Results   Component Value Date/Time     (H) 05/03/2018 04:46 AM    GLUCPOC 242 (H) 05/03/2018 12:08 PM    GLUCPOC 211 (H) 05/03/2018 06:28 AM    GLUCPOC 182 (H) 05/02/2018 09:05 PM        Lab Results   Component Value Date/Time    Creatinine 0.78 05/03/2018 04:46 AM     Estimated Creatinine Clearance: 47.7 mL/min (based on Cr of 0.78). Active Orders   Diet    DIET CARDIAC Regular; 2 GM NA (House Low NA)        PO intake:   Patient Vitals for the past 72 hrs:   % Diet Eaten   05/02/18 1903 10 %   05/02/18 0800 40 %   05/01/18 1800 25 %   04/30/18 1300 0 %       Will continue to follow as needed.     Thank you  Raisa Barrett RN, CDE

## 2018-05-03 NOTE — PROGRESS NOTES
ID Progress Note  5/3/2018    Subjective:     She remains weak. She has needed bipap intermittently. She has no specific complaints. She is out of the ICU. Objective:     Antibiotics:  1. Merrem  2. Levaquin  3. Fluconazole      Vitals:   Visit Vitals    /47 (BP 1 Location: Left arm, BP Patient Position: At rest)    Pulse 84    Temp 97.9 °F (36.6 °C)    Resp 21    Ht 5' 1\" (1.549 m)    Wt 61.7 kg (136 lb 0.4 oz)    SpO2 100%    BMI 25.7 kg/m2        Tmax:  Temp (24hrs), Av.3 °F (36.8 °C), Min:97.5 °F (36.4 °C), Max:99 °F (37.2 °C)      Exam:  Lungs:  rhonchi R anterior  Heart:  regular rate and rhythm  Abdomen:  soft, non-tender. Bowel sounds normal. No masses,  no organomegaly  Skin:  no rash or abnormalities    Labs:      Recent Labs      18   0446  18   0241  18   0407   WBC  16.7*  17.4*  21.1*   HGB  9.6*  9.5*  10.1*   PLT  176  180  187   BUN  24*  30*  34*   CREA  0.78  1.01  1.08*   SGOT  36  44*  75*   AP  325*  386*  484*   TBILI  0.5  0.6  0.6       Cultures:     No results found for: [facility]  Lab Results   Component Value Date/Time    Culture result: LIGHT YEAST (A) 2018 11:30 AM    Culture result: LIGHT NORMAL RESPIRATORY BERYL 2018 11:30 AM    Culture result: LIGHT APPARENT CANDIDA ALBICANS (A) 2018 09:10 AM    Culture result: NO NORMAL RESPIRATORY BERYL ISOLATED 2018 09:10 AM       Radiology: X ray stable    Line/Insert Date:           Assessment:     1. Pneumonia  2. Metastatic cancer  3. Debility  4. Candida from sputum (uncertain significance)  5. Leukocytosis--WBC up and down    Objective:     1. Continue current therapy  2. Follow up pending cultures and studies  3.  D/W caregiver    Ava Dooley MD

## 2018-05-03 NOTE — PROGRESS NOTES
Problem: Self Care Deficits Care Plan (Adult)  Goal: *Acute Goals and Plan of Care (Insert Text)  Occupational Therapy Goals  Initiated 4/20/2018; Reviewed 4/30/18  1. Patient will perform grooming set up assist bedside level within 7 days. 2.  Patient will perform bathing seated with moderate assistance  within 7 day(s). 3.  Patient will perform upper body dressing with minimal assistance/contact guard assist within 7 day(s). 4.  Patient will perform toilet transfers with minimal assistance/contact guard assist within 7 day(s). 5.  Patient will participate in upper extremity therapeutic exercise/activities with supervision/set-up for 10 minutes within 7 day(s). 6.  Patient will utilize energy conservation techniques during functional activities with verbal cues within 7 day(s). Occupational Therapy TREATMENT  Patient: Lyndsay Mead (89 y.o. female)  Date: 5/3/2018  Diagnosis: SOB (shortness of breath)  colon  Anemia  COLON SOB (shortness of breath)    7 Days Post-Op  Precautions: Fall, DNR  Chart, occupational therapy assessment, plan of care, and goals were reviewed. ASSESSMENT:  Patient received supine in bed and willing to participate with OT. Care partner present in the room. Patient able to roll on left at mod assist using R UE on bed rail to assist.  Achieved supine to sit to EOB at mod assist x 2. Seated on EOB, left lateral lean noted. CGA for steadying support. Pt wanted her own slip on shoes on to perform sit to stand. Max assist to don slip-on shoes 2/2 edema in both feet. Sit to stand at min to mod assist x 2, took 3-4 steps in chair. Total assist to change undergarments in standing, therapist performed sit to stand with pt at mod assist x 1 and care partner pulled up her undergarments. Patient made good gains today and tolerating sitting in chair.    Progression toward goals:  []       Improving appropriately and progressing toward goals  [x]       Improving slowly and progressing toward goals  []       Not making progress toward goals and plan of care will be adjusted     PLAN:  Patient continues to benefit from skilled intervention to address the above impairments. Continue treatment per established plan of care. Discharge Recommendations:  Rehab  Further Equipment Recommendations for Discharge:  TBD     SUBJECTIVE:   Patient stated It feel good to sit in the chair.     OBJECTIVE DATA SUMMARY:   Cognitive/Behavioral Status:  Neurologic State: Alert  Orientation Level: Oriented X4  Cognition: Follows commands             Functional Mobility and Transfers for ADLs:  Bed Mobility:  Rolling: Moderate assistance  Supine to Sit: Moderate assistance  Sit to Supine: Total assistance;Assist x2  Scooting: Maximum assistance    Transfers:        Bed to Chair: Minimum assistance;Assist x2    Balance:  Sitting: Intact; With support  Sitting - Static: Poor (constant support)  Sitting - Dynamic: Poor (constant support)  Standing: Intact; With support    ADL Intervention:            Lower Body Dressing Assistance  Dressing Assistance: Maximum assistance  Protective Undergarmet: Total assistance (dependent)  Slip on Shoes with Back: Maximum assistance  Position Performed: Seated edge of bed;Standing    Toileting  Toileting Assistance: Maximum assistance  Bladder Hygiene: Maximum assistance  Clothing Management: Maximum assistance           Pain:  Pain Scale 1: Numeric (0 - 10)  Pain Intensity 1: 0              Activity Tolerance:   Fair  Please refer to the flowsheet for vital signs taken during this treatment.   After treatment:   [x] Patient left in no apparent distress sitting up in chair  [] Patient left in no apparent distress in bed  [x] Call bell left within reach  [] Nursing notified  [x] Caregiver present  [] Bed alarm activated    COMMUNICATION/COLLABORATION:   The patients plan of care was discussed with: Registered Nurse    AMADOU Lino/L  Time Calculation: 27 mins

## 2018-05-03 NOTE — PROGRESS NOTES
0430 - 11 lb weight gain noted. Pt weighed in bed with one pillow, one sheet, and one blanket. Will discuss with day RN. 0800 - Bedside and Verbal shift change report given to Carolina Thapa (oncoming nurse) by Zander Ruiz (offgoing nurse). Report included the following information SBAR, Kardex, ED Summary, Procedure Summary, Intake/Output, MAR, Accordion, Recent Results, Med Rec Status and Cardiac Rhythm NSR. Problem: Falls - Risk of  Goal: *Absence of Falls  Document Yaron Fall Risk and appropriate interventions in the flowsheet. Outcome: Progressing Towards Goal  Fall Risk Interventions:  Mobility Interventions: Communicate number of staff needed for ambulation/transfer, Patient to call before getting OOB, PT Consult for mobility concerns    Mentation Interventions: Adequate sleep, hydration, pain control, Door open when patient unattended, Evaluate medications/consider consulting pharmacy, Increase mobility, Reorient patient, More frequent rounding, Update white board    Medication Interventions: Evaluate medications/consider consulting pharmacy, Patient to call before getting OOB    Elimination Interventions: Call light in reach, Patient to call for help with toileting needs, Toileting schedule/hourly rounds    History of Falls Interventions: Consult care management for discharge planning, Door open when patient unattended, Evaluate medications/consider consulting pharmacy    Pt has had no falls during admission. Call bell and belongings within reach. Pt to call before getting OOB. Family at bedside. Problem: Pressure Injury - Risk of  Goal: *Prevention of pressure ulcer  Outcome: Progressing Towards Goal  Pt being turned Q2hrs. Barrier cream being applied to sacrum/coccyx. Problem: Sepsis: Discharge Outcomes  Goal: *Optimal pain control at patient's stated goal  Outcome: Progressing Towards Goal  Pt having no complaints of pain.

## 2018-05-03 NOTE — PROGRESS NOTES
Pt asleep, appears to be resting comfortably during bedside verbal report. Bedside and Verbal shift change report given to Tita Orosco (oncoming nurse) by Kerri Vazquez (offgoing nurse).  Report included the following information SBAR, Kardex, Intake/Output, MAR, Recent Results and Cardiac Rhythm SR.

## 2018-05-04 ENCOUNTER — APPOINTMENT (OUTPATIENT)
Dept: CT IMAGING | Age: 83
DRG: 853 | End: 2018-05-04
Attending: HOSPITALIST
Payer: MEDICARE

## 2018-05-04 ENCOUNTER — APPOINTMENT (OUTPATIENT)
Dept: GENERAL RADIOLOGY | Age: 83
DRG: 853 | End: 2018-05-04
Attending: NURSE PRACTITIONER
Payer: MEDICARE

## 2018-05-04 LAB
ALBUMIN SERPL-MCNC: 1.8 G/DL (ref 3.5–5)
ALBUMIN/GLOB SERPL: 0.4 {RATIO} (ref 1.1–2.2)
ALP SERPL-CCNC: 336 U/L (ref 45–117)
ALT SERPL-CCNC: 28 U/L (ref 12–78)
ANION GAP SERPL CALC-SCNC: 5 MMOL/L (ref 5–15)
AST SERPL-CCNC: 46 U/L (ref 15–37)
BASOPHILS # BLD: 0 K/UL (ref 0–0.1)
BASOPHILS NFR BLD: 0 % (ref 0–1)
BILIRUB SERPL-MCNC: 0.5 MG/DL (ref 0.2–1)
BUN SERPL-MCNC: 22 MG/DL (ref 6–20)
BUN/CREAT SERPL: 27 (ref 12–20)
CALCIUM SERPL-MCNC: 9 MG/DL (ref 8.5–10.1)
CHLORIDE SERPL-SCNC: 102 MMOL/L (ref 97–108)
CO2 SERPL-SCNC: 31 MMOL/L (ref 21–32)
CREAT SERPL-MCNC: 0.83 MG/DL (ref 0.55–1.02)
DIFFERENTIAL METHOD BLD: ABNORMAL
EOSINOPHIL # BLD: 0.1 K/UL (ref 0–0.4)
EOSINOPHIL NFR BLD: 1 % (ref 0–7)
ERYTHROCYTE [DISTWIDTH] IN BLOOD BY AUTOMATED COUNT: 22.6 % (ref 11.5–14.5)
GLOBULIN SER CALC-MCNC: 4.4 G/DL (ref 2–4)
GLUCOSE BLD STRIP.AUTO-MCNC: 156 MG/DL (ref 65–100)
GLUCOSE BLD STRIP.AUTO-MCNC: 188 MG/DL (ref 65–100)
GLUCOSE BLD STRIP.AUTO-MCNC: 196 MG/DL (ref 65–100)
GLUCOSE BLD STRIP.AUTO-MCNC: 262 MG/DL (ref 65–100)
GLUCOSE SERPL-MCNC: 180 MG/DL (ref 65–100)
HCT VFR BLD AUTO: 29.9 % (ref 35–47)
HGB BLD-MCNC: 9.5 G/DL (ref 11.5–16)
IMM GRANULOCYTES # BLD: 0.1 K/UL (ref 0–0.04)
IMM GRANULOCYTES NFR BLD AUTO: 1 % (ref 0–0.5)
LYMPHOCYTES # BLD: 1.3 K/UL (ref 0.8–3.5)
LYMPHOCYTES NFR BLD: 10 % (ref 12–49)
MAGNESIUM SERPL-MCNC: 2 MG/DL (ref 1.6–2.4)
MCH RBC QN AUTO: 29 PG (ref 26–34)
MCHC RBC AUTO-ENTMCNC: 31.8 G/DL (ref 30–36.5)
MCV RBC AUTO: 91.2 FL (ref 80–99)
MONOCYTES # BLD: 1.2 K/UL (ref 0–1)
MONOCYTES NFR BLD: 9 % (ref 5–13)
NEUTS SEG # BLD: 10.5 K/UL (ref 1.8–8)
NEUTS SEG NFR BLD: 79 % (ref 32–75)
NRBC # BLD: 0 K/UL (ref 0–0.01)
NRBC BLD-RTO: 0 PER 100 WBC
PHOSPHATE SERPL-MCNC: 2.5 MG/DL (ref 2.6–4.7)
PLATELET # BLD AUTO: 197 K/UL (ref 150–400)
PMV BLD AUTO: 11 FL (ref 8.9–12.9)
POTASSIUM SERPL-SCNC: 3.9 MMOL/L (ref 3.5–5.1)
PROT SERPL-MCNC: 6.2 G/DL (ref 6.4–8.2)
RBC # BLD AUTO: 3.28 M/UL (ref 3.8–5.2)
RBC MORPH BLD: ABNORMAL
SERVICE CMNT-IMP: ABNORMAL
SODIUM SERPL-SCNC: 138 MMOL/L (ref 136–145)
WBC # BLD AUTO: 13.2 K/UL (ref 3.6–11)
WBC MORPH BLD: ABNORMAL

## 2018-05-04 PROCEDURE — 82962 GLUCOSE BLOOD TEST: CPT

## 2018-05-04 PROCEDURE — 36592 COLLECT BLOOD FROM PICC: CPT

## 2018-05-04 PROCEDURE — 85025 COMPLETE CBC W/AUTO DIFF WBC: CPT | Performed by: HOSPITALIST

## 2018-05-04 PROCEDURE — 74011250637 HC RX REV CODE- 250/637: Performed by: INTERNAL MEDICINE

## 2018-05-04 PROCEDURE — 84100 ASSAY OF PHOSPHORUS: CPT | Performed by: HOSPITALIST

## 2018-05-04 PROCEDURE — 94640 AIRWAY INHALATION TREATMENT: CPT

## 2018-05-04 PROCEDURE — 65660000000 HC RM CCU STEPDOWN

## 2018-05-04 PROCEDURE — 74150 CT ABDOMEN W/O CONTRAST: CPT

## 2018-05-04 PROCEDURE — 71250 CT THORAX DX C-: CPT

## 2018-05-04 PROCEDURE — 97110 THERAPEUTIC EXERCISES: CPT

## 2018-05-04 PROCEDURE — 74011250636 HC RX REV CODE- 250/636: Performed by: HOSPITALIST

## 2018-05-04 PROCEDURE — 74011000250 HC RX REV CODE- 250: Performed by: INTERNAL MEDICINE

## 2018-05-04 PROCEDURE — 74011636637 HC RX REV CODE- 636/637: Performed by: HOSPITALIST

## 2018-05-04 PROCEDURE — 74011250636 HC RX REV CODE- 250/636: Performed by: INTERNAL MEDICINE

## 2018-05-04 PROCEDURE — 80053 COMPREHEN METABOLIC PANEL: CPT | Performed by: HOSPITALIST

## 2018-05-04 PROCEDURE — 97535 SELF CARE MNGMENT TRAINING: CPT

## 2018-05-04 PROCEDURE — 83735 ASSAY OF MAGNESIUM: CPT | Performed by: HOSPITALIST

## 2018-05-04 PROCEDURE — 74011250637 HC RX REV CODE- 250/637: Performed by: NURSE PRACTITIONER

## 2018-05-04 PROCEDURE — 97530 THERAPEUTIC ACTIVITIES: CPT

## 2018-05-04 PROCEDURE — 74011000258 HC RX REV CODE- 258: Performed by: HOSPITALIST

## 2018-05-04 PROCEDURE — 71045 X-RAY EXAM CHEST 1 VIEW: CPT

## 2018-05-04 PROCEDURE — 36415 COLL VENOUS BLD VENIPUNCTURE: CPT | Performed by: HOSPITALIST

## 2018-05-04 PROCEDURE — 74011250637 HC RX REV CODE- 250/637: Performed by: HOSPITALIST

## 2018-05-04 PROCEDURE — 74011000250 HC RX REV CODE- 250: Performed by: HOSPITALIST

## 2018-05-04 RX ORDER — INSULIN GLARGINE 100 [IU]/ML
10 INJECTION, SOLUTION SUBCUTANEOUS DAILY
Status: DISCONTINUED | OUTPATIENT
Start: 2018-05-05 | End: 2018-05-05

## 2018-05-04 RX ORDER — FUROSEMIDE 10 MG/ML
40 INJECTION INTRAMUSCULAR; INTRAVENOUS ONCE
Status: COMPLETED | OUTPATIENT
Start: 2018-05-04 | End: 2018-05-04

## 2018-05-04 RX ORDER — FUROSEMIDE 40 MG/1
40 TABLET ORAL DAILY
Status: DISCONTINUED | OUTPATIENT
Start: 2018-05-04 | End: 2018-05-04

## 2018-05-04 RX ORDER — LEVOFLOXACIN 750 MG/1
750 TABLET ORAL
Status: DISCONTINUED | OUTPATIENT
Start: 2018-05-04 | End: 2018-05-05

## 2018-05-04 RX ORDER — BISACODYL 5 MG
5 TABLET, DELAYED RELEASE (ENTERIC COATED) ORAL DAILY
Status: DISCONTINUED | OUTPATIENT
Start: 2018-05-05 | End: 2018-05-06

## 2018-05-04 RX ORDER — LEVOFLOXACIN 750 MG/1
750 TABLET ORAL
Status: DISCONTINUED | OUTPATIENT
Start: 2018-05-04 | End: 2018-05-04

## 2018-05-04 RX ORDER — FUROSEMIDE 20 MG/1
20 TABLET ORAL DAILY
Status: DISCONTINUED | OUTPATIENT
Start: 2018-05-04 | End: 2018-05-04

## 2018-05-04 RX ORDER — FUROSEMIDE 40 MG/1
40 TABLET ORAL DAILY
Status: DISCONTINUED | OUTPATIENT
Start: 2018-05-05 | End: 2018-05-05

## 2018-05-04 RX ADMIN — OXYCODONE HYDROCHLORIDE AND ACETAMINOPHEN 1000 MG: 500 TABLET ORAL at 08:48

## 2018-05-04 RX ADMIN — MEROPENEM 500 MG: 500 INJECTION, POWDER, FOR SOLUTION INTRAVENOUS at 02:00

## 2018-05-04 RX ADMIN — DIBASIC SODIUM PHOSPHATE, MONOBASIC POTASSIUM PHOSPHATE AND MONOBASIC SODIUM PHOSPHATE 1 TABLET: 852; 155; 130 TABLET ORAL at 19:26

## 2018-05-04 RX ADMIN — Medication 400 MG: at 08:48

## 2018-05-04 RX ADMIN — FUROSEMIDE 40 MG: 10 INJECTION, SOLUTION INTRAMUSCULAR; INTRAVENOUS at 18:21

## 2018-05-04 RX ADMIN — Medication 10 ML: at 14:58

## 2018-05-04 RX ADMIN — INSULIN LISPRO 5 UNITS: 100 INJECTION, SOLUTION INTRAVENOUS; SUBCUTANEOUS at 17:17

## 2018-05-04 RX ADMIN — IPRATROPIUM BROMIDE AND ALBUTEROL SULFATE 3 ML: .5; 3 SOLUTION RESPIRATORY (INHALATION) at 09:02

## 2018-05-04 RX ADMIN — THERA TABS 1 TABLET: TAB at 08:48

## 2018-05-04 RX ADMIN — POLYETHYLENE GLYCOL 3350 17 G: 17 POWDER, FOR SOLUTION ORAL at 08:49

## 2018-05-04 RX ADMIN — Medication 10 ML: at 22:54

## 2018-05-04 RX ADMIN — Medication 10 ML: at 14:57

## 2018-05-04 RX ADMIN — EPOETIN ALFA 10000 UNITS: 10000 SOLUTION INTRAVENOUS; SUBCUTANEOUS at 14:09

## 2018-05-04 RX ADMIN — INSULIN LISPRO 2 UNITS: 100 INJECTION, SOLUTION INTRAVENOUS; SUBCUTANEOUS at 12:51

## 2018-05-04 RX ADMIN — Medication 1 CAPSULE: at 08:48

## 2018-05-04 RX ADMIN — Medication 10 ML: at 07:13

## 2018-05-04 RX ADMIN — Medication 5 ML: at 06:00

## 2018-05-04 RX ADMIN — Medication 1 CAPSULE: at 08:49

## 2018-05-04 RX ADMIN — MEROPENEM 500 MG: 500 INJECTION, POWDER, FOR SOLUTION INTRAVENOUS at 19:32

## 2018-05-04 RX ADMIN — IPRATROPIUM BROMIDE AND ALBUTEROL SULFATE 3 ML: .5; 3 SOLUTION RESPIRATORY (INHALATION) at 01:35

## 2018-05-04 RX ADMIN — HYDRALAZINE HYDROCHLORIDE 25 MG: 25 TABLET ORAL at 17:17

## 2018-05-04 RX ADMIN — ACETYLCYSTEINE 200 MG: 200 SOLUTION ORAL; RESPIRATORY (INHALATION) at 09:02

## 2018-05-04 RX ADMIN — Medication 10 ML: at 06:00

## 2018-05-04 RX ADMIN — LEVOTHYROXINE SODIUM 88 MCG: 88 TABLET ORAL at 07:12

## 2018-05-04 RX ADMIN — LACTULOSE 10 G: 20 SOLUTION ORAL at 18:35

## 2018-05-04 RX ADMIN — VITAMIN D, TAB 1000IU (100/BT) 1000 UNITS: 25 TAB at 08:49

## 2018-05-04 RX ADMIN — ASPIRIN 81 MG 81 MG: 81 TABLET ORAL at 08:48

## 2018-05-04 RX ADMIN — IPRATROPIUM BROMIDE AND ALBUTEROL SULFATE 3 ML: .5; 3 SOLUTION RESPIRATORY (INHALATION) at 14:29

## 2018-05-04 RX ADMIN — HYDRALAZINE HYDROCHLORIDE 10 MG: 20 INJECTION INTRAMUSCULAR; INTRAVENOUS at 22:54

## 2018-05-04 RX ADMIN — BUDESONIDE 500 MCG: 0.5 INHALANT RESPIRATORY (INHALATION) at 09:02

## 2018-05-04 RX ADMIN — SPIRONOLACTONE 25 MG: 25 TABLET, FILM COATED ORAL at 08:49

## 2018-05-04 RX ADMIN — INSULIN LISPRO 2 UNITS: 100 INJECTION, SOLUTION INTRAVENOUS; SUBCUTANEOUS at 07:12

## 2018-05-04 RX ADMIN — ACETYLCYSTEINE 200 MG: 200 SOLUTION ORAL; RESPIRATORY (INHALATION) at 14:29

## 2018-05-04 RX ADMIN — FOLIC ACID 1 MG: 1 TABLET ORAL at 08:49

## 2018-05-04 RX ADMIN — CARVEDILOL 6.25 MG: 6.25 TABLET, FILM COATED ORAL at 08:48

## 2018-05-04 RX ADMIN — LEVOFLOXACIN 750 MG: 750 TABLET, FILM COATED ORAL at 17:17

## 2018-05-04 RX ADMIN — MEROPENEM 500 MG: 500 INJECTION, POWDER, FOR SOLUTION INTRAVENOUS at 11:15

## 2018-05-04 RX ADMIN — CARVEDILOL 6.25 MG: 6.25 TABLET, FILM COATED ORAL at 17:17

## 2018-05-04 RX ADMIN — INSULIN GLARGINE 8 UNITS: 100 INJECTION, SOLUTION SUBCUTANEOUS at 08:49

## 2018-05-04 RX ADMIN — Medication 10 ML: at 18:21

## 2018-05-04 RX ADMIN — LISINOPRIL 5 MG: 5 TABLET ORAL at 08:49

## 2018-05-04 RX ADMIN — HYDRALAZINE HYDROCHLORIDE 25 MG: 25 TABLET ORAL at 08:48

## 2018-05-04 RX ADMIN — ANASTROZOLE 1 MG: 1 TABLET, COATED ORAL at 08:50

## 2018-05-04 NOTE — PROGRESS NOTES
Tried to call report to 2N. On hold for 14 minutes and was told that the nurse taking report (ΣΑΡΑΝΤΙ) was in a meeting and would call me back. 1350-gave report to ΣΑΡΑΝΤΙ. Paged MD to have bipap orders taken out or changed to reflect bipap at night only. 2 [facility] cannot take patient if order states PRN day bipap.

## 2018-05-04 NOTE — PROGRESS NOTES
8 AM Report received from ARIANA Machado    10:56 AM Verbal order received to transfer pt to remote telemetry    11:30 AM Bedside and Verbal shift change report given to Glenis Armstrong RN (oncoming nurse) by Aaron Pena RN (offgoing nurse). Report included the following information SBAR, Kardex, Procedure Summary, Intake/Output, MAR, Accordion, Recent Results and Cardiac Rhythm NSR.

## 2018-05-04 NOTE — PROGRESS NOTES
ID Progress Note  2018    Subjective:     She remains weak. Objective:     Antibiotics:  1. Merrem  2. Levaquin  3. Fluconazole      Vitals:   Visit Vitals    /43 (BP 1 Location: Left arm, BP Patient Position: At rest)    Pulse 80    Temp 97.1 °F (36.2 °C)    Resp 15    Ht 5' 1\" (1.549 m)    Wt 62 kg (136 lb 11 oz)    SpO2 97%    BMI 25.83 kg/m2        Tmax:  Temp (24hrs), Av.9 °F (36.6 °C), Min:97.1 °F (36.2 °C), Max:98.2 °F (36.8 °C)      Exam:  Lungs:  rhonchi R anterior  Heart:  regular rate and rhythm  Abdomen:  soft, non-tender. Bowel sounds normal. No masses,  no organomegaly  Skin:  no rash or abnormalities    Labs:      Recent Labs      18   0320  18   0446  18   0241   WBC  13.2*  16.7*  17.4*   HGB  9.5*  9.6*  9.5*   PLT  197  176  180   BUN  22*  24*  30*   CREA  0.83  0.78  1.01   SGOT  46*  36  44*   AP  336*  325*  386*   TBILI  0.5  0.5  0.6       Cultures:     No results found for: [facility]  Lab Results   Component Value Date/Time    Culture result: LIGHT YEAST (A) 2018 11:30 AM    Culture result: LIGHT NORMAL RESPIRATORY BERYL 2018 11:30 AM    Culture result: LIGHT APPARENT CANDIDA ALBICANS (A) 2018 09:10 AM    Culture result: NO NORMAL RESPIRATORY BERYL ISOLATED 2018 09:10 AM       Radiology: X ray with increased opacification on the left    Line/Insert Date:           Assessment:     1. Pneumonia  2. Metastatic cancer  3. Debility  4. Candida from sputum (uncertain significance)  5. Leukocytosis--WBC up and down--improved today    Objective:     1. Continue current therapy  2. Follow up pending cultures and studies  3. Aggressive pulmonary toilet  4.  D/W tia Louis MD

## 2018-05-04 NOTE — PROGRESS NOTES
Spoke with son and patient at bedside to discuss disposition. They would like a referral sent to [facility] for rehab. Referral sent via allscripts.     Fabian Wilks RN CRM  Ext [phone]

## 2018-05-04 NOTE — PROGRESS NOTES
Cardiology Progress Note            Admit Date: 4/16/2018  Admit Diagnosis: SOB (shortness of breath)  colon  Anemia  COLON  Date: 5/4/2018     Time: 2:01 PM    Subjective:   States she is hungry. Still with some SOB but overall feels a little better. Denies chest pain. Assessment and Plan     1. NSTEMI:     -No chest pain. -Repeat TTE:4/27/18  EF unchanged 45% with mild hypokinesis  basal-mid anteroseptal and apical walls. -ASA. Continue off Plavix   -Continue coreg 6.25 mg BID   -Statin intolerant due to weakness on zocor, on repatha, last LDL 31, so will not rechallenge statin. On fish-oil.   -might consider LHC next week if pt continues to improve       2. Cardiomyopathy/Acute HFrEF, new:  EF 45% NYHA IV     -Lisinopril 5 mg po daily (watch creatinine). -Coreg to 6.25 mg BID today.   -Daily weights, I/Os   -Continue aldactone.    -Will add low dose po Lasix 20 mg daily. - Monitor labs        3. JEROD on CKD:   Creatinine normalized. GFR >60   -Nephrology following. 4. Acute hypoxic respiratory failure:multifactorial.  On room air today- sats 98%   -difficulty managing secretions   -Spironolactone   -Add Lasix 40 mg po daily   -CXR today     5. Anemia:hgb stable 9.5 On procrit. 6. HTN- BP labile.   -Coreg to 6.25 mg BID (was increased 5/1 PM)   -Hydralazine 25 mg TID   -Low dose ACE-I    7. Leukocytosis/Ecoli UTI/PNA:  ID following WBC trending down. 8. Hx of Colon cancer, now metastatic breast ca (bone, liver,calvarium mets) on Arimedex  -Hematology oncology following. On Arimesex. 9. Advanced directives:  No shock or CPR, DNR Palliative care following. 10. Deconditioned:  Very weak. Will need PT and rehab-     Still with some SOB, weak cough. Will obtain CXR, add low dose lasix po. Pt very frail. May consider LHC next week.    Dr. Campbell Adjutant covering cardiology service this weekend and will see pt over weekend. Clara Chandler. MARIETTA [place] 0900. Cardiology Attending:Patient seen and examined. I agree with NP assessment and plans. Very fragile. Cholo Wilson MD 5/4/2018 12:23 PM             Objective:      Physical Exam:                Visit Vitals    /47 (BP 1 Location: Left arm, BP Patient Position: At rest)    Pulse 87    Temp 98 °F (36.7 °C)    Resp 22    Ht 5' 1\" (1.549 m)    Wt 62 kg (136 lb 11 oz)    SpO2 98%    BMI 25.83 kg/m2          General Appearance:   elderly female, NAD, on RA   Ears/Nose/Mouth/Throat:    Hearing grossly normal.         Neck:  Supple. Chest:     Course breath sounds bilaterally. Mildly Decreased breath sounds on left. Cardiovascular:   Regular rate and rhythm, S1, S2 normal, no murmur   Abdomen:    Soft, nontender, no gaurding. Extremities:  No edema bilaterally. Skin:  Warm and dry.      Telemetry: Sinus rhythm-       Data Review:    Labs:    Recent Results (from the past 24 hour(s))   GLUCOSE, POC    Collection Time: 05/03/18 12:08 PM   Result Value Ref Range    Glucose (POC) 242 (H) 65 - 100 mg/dL    Performed by Linda Conde    GLUCOSE, POC    Collection Time: 05/03/18  4:31 PM   Result Value Ref Range    Glucose (POC) 221 (H) 65 - 100 mg/dL    Performed by Loyd Delvalle    GLUCOSE, POC    Collection Time: 05/03/18  9:52 PM   Result Value Ref Range    Glucose (POC) 186 (H) 65 - 100 mg/dL    Performed by Ronni Serrato    METABOLIC PANEL, COMPREHENSIVE    Collection Time: 05/04/18  3:20 AM   Result Value Ref Range    Sodium 138 136 - 145 mmol/L    Potassium 3.9 3.5 - 5.1 mmol/L    Chloride 102 97 - 108 mmol/L    CO2 31 21 - 32 mmol/L    Anion gap 5 5 - 15 mmol/L    Glucose 180 (H) 65 - 100 mg/dL    BUN 22 (H) 6 - 20 MG/DL    Creatinine 0.83 0.55 - 1.02 MG/DL    BUN/Creatinine ratio 27 (H) 12 - 20      GFR est AA >60 >60 ml/min/1.73m2    GFR est non-AA >60 >60 ml/min/1.73m2    Calcium 9.0 8.5 - 10.1 MG/DL    Bilirubin, total 0.5 0.2 - 1.0 MG/DL    ALT (SGPT) 28 12 - 78 U/L    AST (SGOT) 46 (H) 15 - 37 U/L    Alk. phosphatase 336 (H) 45 - 117 U/L    Protein, total 6.2 (L) 6.4 - 8.2 g/dL    Albumin 1.8 (L) 3.5 - 5.0 g/dL    Globulin 4.4 (H) 2.0 - 4.0 g/dL    A-G Ratio 0.4 (L) 1.1 - 2.2     MAGNESIUM    Collection Time: 05/04/18  3:20 AM   Result Value Ref Range    Magnesium 2.0 1.6 - 2.4 mg/dL   PHOSPHORUS    Collection Time: 05/04/18  3:20 AM   Result Value Ref Range    Phosphorus 2.5 (L) 2.6 - 4.7 MG/DL   CBC WITH AUTOMATED DIFF    Collection Time: 05/04/18  3:20 AM   Result Value Ref Range    WBC 13.2 (H) 3.6 - 11.0 K/uL    RBC 3.28 (L) 3.80 - 5.20 M/uL    HGB 9.5 (L) 11.5 - 16.0 g/dL    HCT 29.9 (L) 35.0 - 47.0 %    MCV 91.2 80.0 - 99.0 FL    MCH 29.0 26.0 - 34.0 PG    MCHC 31.8 30.0 - 36.5 g/dL    RDW 22.6 (H) 11.5 - 14.5 %    PLATELET 166 820 - 758 K/uL    MPV 11.0 8.9 - 12.9 FL    NRBC 0.0 0  WBC    ABSOLUTE NRBC 0.00 0.00 - 0.01 K/uL    NEUTROPHILS 79 (H) 32 - 75 %    LYMPHOCYTES 10 (L) 12 - 49 %    MONOCYTES 9 5 - 13 %    EOSINOPHILS 1 0 - 7 %    BASOPHILS 0 0 - 1 %    IMMATURE GRANULOCYTES 1 (H) 0.0 - 0.5 %    ABS. NEUTROPHILS 10.5 (H) 1.8 - 8.0 K/UL    ABS. LYMPHOCYTES 1.3 0.8 - 3.5 K/UL    ABS. MONOCYTES 1.2 (H) 0.0 - 1.0 K/UL    ABS. EOSINOPHILS 0.1 0.0 - 0.4 K/UL    ABS. BASOPHILS 0.0 0.0 - 0.1 K/UL    ABS. IMM.  GRANS. 0.1 (H) 0.00 - 0.04 K/UL    DF SMEAR SCANNED      RBC COMMENTS ANISOCYTOSIS  2+        RBC COMMENTS OVALOCYTES  PRESENT        RBC COMMENTS POLYCHROMASIA  PRESENT        RBC COMMENTS SPHEROCYTES  PRESENT        WBC COMMENTS HYPERSEGMENTED POLYS     GLUCOSE, POC    Collection Time: 05/04/18  6:37 AM   Result Value Ref Range    Glucose (POC) 156 (H) 65 - 100 mg/dL    Performed by Dai Taylor           Radiology:        Current Facility-Administered Medications   Medication Dose Route Frequency    lisinopril (PRINIVIL, ZESTRIL) tablet 5 mg  5 mg Oral DAILY    albuterol-ipratropium (DUO-NEB) 2.5 MG-0.5 MG/3 ML  3 mL Nebulization Q6H RT    insulin glargine (LANTUS) injection 8 Units  8 Units SubCUTAneous DAILY    acetylcysteine (MUCOMYST) 200 mg/mL (20 %) solution 200 mg  200 mg Nebulization TID RT    lactobac ac& pc-s.therm-b.anim (BERYL Q/RISAQUAD)  1 Cap Oral DAILY    ascorbic acid (vitamin C) (VITAMIN C) tablet 1,000 mg  1,000 mg Oral DAILY    polyethylene glycol (MIRALAX) packet 17 g  17 g Oral DAILY    naloxone (NARCAN) injection 0.4 mg  0.4 mg IntraVENous Multiple    flumazenil (ROMAZICON) 0.1 mg/mL injection 0.2 mg  0.2 mg IntraVENous Multiple    midazolam (VERSED) injection 0.25-5 mg  0.25-5 mg IntraVENous Multiple    carvedilol (COREG) tablet 6.25 mg  6.25 mg Oral BID WITH MEALS    meropenem (MERREM) 500 mg in 0.9% sodium chloride (MBP/ADV) 50 mL  0.5 g IntraVENous Q8H    fluconazole (DIFLUCAN) 200mg/100 mL IVPB (premix)  200 mg IntraVENous Q24H    aspirin chewable tablet 81 mg  81 mg Oral DAILY    spironolactone (ALDACTONE) tablet 25 mg  25 mg Oral DAILY    acetaminophen (TYLENOL) suppository 650 mg  650 mg Rectal Q4H PRN    hydrALAZINE (APRESOLINE) tablet 25 mg  25 mg Oral TID    levoFLOXacin (LEVAQUIN) 750 mg in D5W IVPB  750 mg IntraVENous Q48H    sodium chloride (NS) flush 5-10 mL  5-10 mL IntraVENous Q8H    sodium chloride (NS) flush 5-10 mL  5-10 mL IntraVENous PRN    budesonide (PULMICORT) 500 mcg/2 ml nebulizer suspension  500 mcg Nebulization BID RT    hydrALAZINE (APRESOLINE) 20 mg/mL injection 10 mg  10 mg IntraVENous Q6H PRN    0.9% sodium chloride infusion 250 mL  250 mL IntraVENous PRN    sodium chloride (NS) flush 20 mL  20 mL InterCATHeter PRN    sodium chloride (NS) flush 10 mL  10 mL InterCATHeter Q24H    sodium chloride (NS) flush 10 mL  10 mL InterCATHeter PRN    sodium chloride (NS) flush 10 mL  10 mL InterCATHeter Q8H    alteplase (CATHFLO) 1 mg in sterile water (preservative free) 1 mL injection  1 mg InterCATHeter PRN    bacitracin 500 unit/gram packet 1 Packet  1 Packet Topical PRN    sodium chloride (NS) flush 10-30 mL  10-30 mL InterCATHeter PRN    sodium chloride (NS) flush 10 mL  10 mL InterCATHeter Q24H    sodium chloride (NS) flush 10 mL  10 mL InterCATHeter PRN    sodium chloride (NS) flush 10-40 mL  10-40 mL InterCATHeter Q8H    sodium chloride (NS) flush 20 mL  20 mL InterCATHeter Q24H    anastrozole (ARIMIDEX) tablet 1 mg  1 mg Oral DAILY    0.9% sodium chloride infusion 250 mL  250 mL IntraVENous PRN    epoetin celio (EPOGEN;PROCRIT) injection 10,000 Units  10,000 Units SubCUTAneous Q MON, WED & FRI    albuterol-ipratropium (DUO-NEB) 2.5 MG-0.5 MG/3 ML  3 mL Nebulization Q6H PRN    glucose chewable tablet 16 g  4 Tab Oral PRN    dextrose (D50W) injection syrg 12.5-25 g  12.5-25 g IntraVENous PRN    glucagon (GLUCAGEN) injection 1 mg  1 mg IntraMUSCular PRN    insulin lispro (HUMALOG) injection   SubCUTAneous AC&HS    prochlorperazine (COMPAZINE) with saline injection 5 mg  5 mg IntraVENous Q8H PRN    cholecalciferol (VITAMIN D3) tablet 1,000 Units  1,000 Units Oral DAILY    folic acid (FOLVITE) tablet 1 mg  1 mg Oral DAILY    magnesium oxide (MAG-OX) tablet 400 mg  400 mg Oral DAILY    therapeutic multivitamin (THERAGRAN) tablet 1 Tab  1 Tab Oral DAILY    fish oil-omega-3 fatty acids 340-1,000 mg capsule 1 Cap  1 Cap Oral DAILY    sodium chloride (NS) flush 5-10 mL  5-10 mL IntraVENous Q8H    sodium chloride (NS) flush 5-10 mL  5-10 mL IntraVENous PRN    ondansetron (ZOFRAN) injection 4 mg  4 mg IntraVENous Q4H PRN    levothyroxine (SYNTHROID) tablet 88 mcg  88 mcg Oral ACB     Pt critically ill, poor prognosis, multiple severe issues. Sandip [place] NP     [facility] [address] [place] [address], [address] [place] [address] [address]   Radha [place]   [phone]

## 2018-05-04 NOTE — PROGRESS NOTES
Clinical Pharmacy Note: Re: IV to PO Automatic Conversion - Antibiotic    Please note: Karime Galaviz medication- Levofloxacin has been changed from IV to PO based on the following criteria:    The patient:  1. Has received IV therapy for at least 48 hours   2. Has a functioning GI tract  - Taking scheduled oral medications  - Tolerating tube feeds at goal rate or a full liquid, soft, or regular diet         3. Is clinically stable        - Temperature < 100.4F for at least 24 hours        - WBC is trending down    This IV to PO conversion is based on the [employer] approved automatic conversion policy for eligible patients. Please call with questions.

## 2018-05-04 NOTE — PROGRESS NOTES
Hospitalist Progress Note  Jessica Scott MD  Answering service: [phone] OR [phone] from in house phone        Date of Service:  2018  NAME:  Ninfa Tolliver  :  1935  MRN:  [id number]      Admission Summary:   The patient is an 70-year-old female with history of diabetes and rheumatoid arthritis who initially presented to the emergency room via EMS with complaints of shortness of breath    Interval history / Subjective:   Seen daughter on bedside. No acute events  Did not need BIPAP last night. Increase Lantus. Discussed case with Son updated about her condition. Rectal done by me did not show fecal impaction. Assessment & Plan:     Acute on chronic hypoxic/hypercarbic respiratory failure due to pulmonary edema, pneumonia  -s/p bronchoscopy on  and on  suctioned and cleared  -respiratory culture grow on  and  grow candida albicans, on diflucan which is stopped today  -,resp distress and CXR worsening worsening of near complete left hemithorax opacification.  -On and off BIPAP  -S/p bronch and clearing of secretion on   -CXR shows Increased bilateral pleural effusions and lung opacities resulting in near  complete opacification of the left hemithorax. I have discussed with her son and ordered CT scan of chest. Pulmonology on case. Acute encephalopathy possible due to metabolic. Resolved  -CT head on  volume loss and minimal white matter disease. No acute intracranial [place], Sinus mucosal inflammatory change  -MRI of brain  Study limited by motion artifact, volume loss and white matter disease. No definite intracranial metastasis however no intravenous contrast was administered due to poor renal function. 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     Sepsis secondary to E Coli UTI, pneumonia  POA  -urine cx   grow e coli, pseudomanas sp  -blood cx on ,  and  no growth  Antinfectives  -Ceftriaxone 1 dose on 4/19  -Diflucan 4/26-present  -Levaquin 4/24-present  -Meropenem 4/24-present  -Zosyn 4/19-4/24  -Vancomycin 4/19-4/28    Stop Diflucan    NSTEMI  - on aspirin,coreg. Statin intolerant,she is on fish oil. She was getting Evolocumab injections out patient.  -cardiologist on board,possible cardiac cath next week    Acute systolic CHF NYHA Class IV  -Intermittent diuresis,on hydralazine, spironolactone,coreg   -BP is high and renal function has improved and stabilized. Start low dose lisinopril    JEROD on CKD stage III,creatinien improved  -creatinine improved and stabilized. Increase Lasix to 40 mg daily  -nephrologist on board    Metastatic left breast cancer with diffuse bone mets.+er,+Pr  -on armidex   -Hem/Onc on board    HTN  -BP stablecon hydralazine, coreg and spironolactone,  monitor BP. Added low dose lisinopril 5/3    DM-II  -Blood glucose 170-130s. She off the oral medications(Glimepiride,sitagliptin and metformin) she was taking PTA  -Add low dose Lantus 8 units sc daily,continue insulin sliding scale, monitor finger stick glucose    Hypothyroidism  -continue synthroid    Hx of GERD  -continue pepcid    Elevated liver enzyme   -possible related to liver lesion, monitor LFT    Anemia multifactorial  -Hgb 6.7, s/p 2 units pRBC on 4/26,   -Hgb 9.3, monitor H/H    Hx of colon cancer 25 years ago  -according to son, there is a work-up planned with [facility] regarding the possibility of recurrent CA (based on lesions seen in the calvarium on MRI - 3/19). -hem/onc on board    H/o stroke with residual weakness on the left    Diet,suspect silent aspiration. Speech recommended:  --Continue regular/thin liquid diet, with son cutting up solids as needed (patient's baseline due to dentition)  --NO STRAWS  --Upright for all PO intake  --MBS showed no evidence of  Aspiration    Hypomagnesemia: iv mg today,on oral magnesium. H/O Seropositive RA of multiple sites,stable.   -Apparently she was getting Etanercept sc injections out patient. Debility: PT/OT requested. She likely needs rehab on discharge        Code status: DNR  DVT prophylaxis:SCD    Care Plan discussed with: Patient/Family and Nurse  Disposition:in ICU    5/1,updated son at bedside and questions answered  Contact sonMi . Hospital Problems  Date Reviewed: 4/16/2018          Codes Class Noted POA    Acute systolic heart failure ([facility] [address] ICD-10-CM: I50.21  ICD-9-CM: 428.21  4/24/2018 Unknown        Altered mental status, unspecified ICD-10-CM: R41.82  ICD-9-CM: 780.97  4/23/2018 Unknown        * (Principal)SOB (shortness of breath) ICD-10-CM: R06.02  ICD-9-CM: 786.05  4/16/2018 Unknown                Vital Signs:    Last 24hrs VS reviewed since prior progress note. Most recent are:  Visit Vitals    /70 (BP 1 Location: Left arm, BP Patient Position: At rest)    Pulse 88    Temp 98.2 °F (36.8 °C)    Resp 26    Ht 5' 1\" (1.549 m)    Wt 62 kg (136 lb 11 oz)    SpO2 98%    BMI 25.83 kg/m2       No intake or output data in the 24 hours ending 05/04/18 1056     Physical Examination:             Constitutional:  Alert and conversant,on nasal canula   ENT:  Oral mucous moist, oropharynx benign. Neck supple,    Resp:  Decrease bronchial breath sound, few wheezing and rhonic bilaterally. No accessory muscle use   CV:  Regular rhythm, normal rate, no murmurs, gallops, rubs    GI:  Soft, non distended, non tender. normoactive bowel sounds, no hepatosplenomegaly     Musculoskeletal:  No edema    Neurologic:  Conscious and alert, oriented to place and person, answer questions, moves all extremities. Left side weak     Skin:  Good turgor, no rashes or ulcers       Data Review:    Review and/or order of clinical lab test      Labs:     Recent Labs      05/04/18   0320  05/03/18 0446   WBC  13.2*  16.7*   HGB  9.5*  9.6*   HCT  29.9*  30.4*   PLT  197  176     Recent Labs      05/04/18   0320  05/03/18 0446 05/02/18   0241   NA  138  137  138   K  3.9  4.2  3.8   CL  102  100  100   CO2  31  29  30   BUN  22*  24*  30*   CREA  0.83  0.78  1.01   GLU  180*  209*  184*   CA  9.0  9.0  9.1   MG  2.0  1.5*  1.6   PHOS  2.5*  2.3*   --      Recent Labs      05/04/18   0320  05/03/18   0446  05/02/18   0241   SGOT  46*  36  44*   ALT  28  19  19   AP  336*  325*  386*   TBILI  0.5  0.5  0.6   TP  6.2*  6.1*  6.2*   ALB  1.8*  1.7*  1.9*   GLOB  4.4*  4.4*  4.3*     No results for input(s): INR, PTP, APTT in the last 72 hours. No lab exists for component: INREXT, INREXT   No results for input(s): FE, TIBC, PSAT, FERR in the last 72 hours. No results found for: FOL, RBCF   No results for input(s): PH, PCO2, PO2 in the last 72 hours. No results for input(s): CPK, CKNDX, TROIQ in the last 72 hours.     No lab exists for component: CPKMB  Lab Results   Component Value Date/Time    Cholesterol, total 74 04/16/2018 04:21 AM    HDL Cholesterol 25 04/16/2018 04:21 AM    LDL, calculated 31.6 04/16/2018 04:21 AM    Triglyceride 87 04/16/2018 04:21 AM    CHOL/HDL Ratio 3.0 04/16/2018 04:21 AM     Lab Results   Component Value Date/Time    Glucose (POC) 156 (H) 05/04/2018 06:37 AM    Glucose (POC) 186 (H) 05/03/2018 09:52 PM    Glucose (POC) 221 (H) 05/03/2018 04:31 PM    Glucose (POC) 242 (H) 05/03/2018 12:08 PM    Glucose (POC) 211 (H) 05/03/2018 06:28 AM     Lab Results   Component Value Date/Time    Color YELLOW/STRAW 04/20/2018 04:30 AM    Appearance TURBID (A) 04/20/2018 04:30 AM    Specific gravity 1.029 04/20/2018 04:30 AM    pH (UA) 5.0 04/20/2018 04:30 AM    Protein 100 (A) 04/20/2018 04:30 AM    Glucose NEGATIVE  04/20/2018 04:30 AM    Ketone NEGATIVE  04/20/2018 04:30 AM    Bilirubin NEGATIVE  04/20/2018 04:30 AM    Urobilinogen 0.2 04/20/2018 04:30 AM    Nitrites NEGATIVE  04/20/2018 04:30 AM    Leukocyte Esterase LARGE (A) 04/20/2018 04:30 AM    Epithelial cells FEW 04/20/2018 04:30 AM    Bacteria 3+ (A) 04/20/2018 04:30 AM    WBC >100 (H) 04/20/2018 04:30 AM    RBC 5-10 04/20/2018 04:30 AM         Medications Reviewed:     Current Facility-Administered Medications   Medication Dose Route Frequency    [START ON 5/5/2018] insulin glargine (LANTUS) injection 10 Units  10 Units SubCUTAneous DAILY    lisinopril (PRINIVIL, ZESTRIL) tablet 5 mg  5 mg Oral DAILY    albuterol-ipratropium (DUO-NEB) 2.5 MG-0.5 MG/3 ML  3 mL Nebulization Q6H RT    acetylcysteine (MUCOMYST) 200 mg/mL (20 %) solution 200 mg  200 mg Nebulization TID RT    lactobac ac& pc-s.therm-b.anim (BERYL Q/RISAQUAD)  1 Cap Oral DAILY    ascorbic acid (vitamin C) (VITAMIN C) tablet 1,000 mg  1,000 mg Oral DAILY    polyethylene glycol (MIRALAX) packet 17 g  17 g Oral DAILY    naloxone (NARCAN) injection 0.4 mg  0.4 mg IntraVENous Multiple    flumazenil (ROMAZICON) 0.1 mg/mL injection 0.2 mg  0.2 mg IntraVENous Multiple    midazolam (VERSED) injection 0.25-5 mg  0.25-5 mg IntraVENous Multiple    carvedilol (COREG) tablet 6.25 mg  6.25 mg Oral BID WITH MEALS    meropenem (MERREM) 500 mg in 0.9% sodium chloride (MBP/ADV) 50 mL  0.5 g IntraVENous Q8H    fluconazole (DIFLUCAN) 200mg/100 mL IVPB (premix)  200 mg IntraVENous Q24H    aspirin chewable tablet 81 mg  81 mg Oral DAILY    spironolactone (ALDACTONE) tablet 25 mg  25 mg Oral DAILY    acetaminophen (TYLENOL) suppository 650 mg  650 mg Rectal Q4H PRN    hydrALAZINE (APRESOLINE) tablet 25 mg  25 mg Oral TID    levoFLOXacin (LEVAQUIN) 750 mg in D5W IVPB  750 mg IntraVENous Q48H    sodium chloride (NS) flush 5-10 mL  5-10 mL IntraVENous Q8H    sodium chloride (NS) flush 5-10 mL  5-10 mL IntraVENous PRN    budesonide (PULMICORT) 500 mcg/2 ml nebulizer suspension  500 mcg Nebulization BID RT    hydrALAZINE (APRESOLINE) 20 mg/mL injection 10 mg  10 mg IntraVENous Q6H PRN    0.9% sodium chloride infusion 250 mL  250 mL IntraVENous PRN    sodium chloride (NS) flush 20 mL  20 mL InterCATHeter PRN    sodium chloride (NS) flush 10 mL  10 mL InterCATHeter Q24H    sodium chloride (NS) flush 10 mL  10 mL InterCATHeter PRN    sodium chloride (NS) flush 10 mL  10 mL InterCATHeter Q8H    alteplase (CATHFLO) 1 mg in sterile water (preservative free) 1 mL injection  1 mg InterCATHeter PRN    bacitracin 500 unit/gram packet 1 Packet  1 Packet Topical PRN    sodium chloride (NS) flush 10-30 mL  10-30 mL InterCATHeter PRN    sodium chloride (NS) flush 10 mL  10 mL InterCATHeter Q24H    sodium chloride (NS) flush 10 mL  10 mL InterCATHeter PRN    sodium chloride (NS) flush 10-40 mL  10-40 mL InterCATHeter Q8H    sodium chloride (NS) flush 20 mL  20 mL InterCATHeter Q24H    anastrozole (ARIMIDEX) tablet 1 mg  1 mg Oral DAILY    0.9% sodium chloride infusion 250 mL  250 mL IntraVENous PRN    epoetin celio (EPOGEN;PROCRIT) injection 10,000 Units  10,000 Units SubCUTAneous Q MON, WED & FRI    albuterol-ipratropium (DUO-NEB) 2.5 MG-0.5 MG/3 ML  3 mL Nebulization Q6H PRN    glucose chewable tablet 16 g  4 Tab Oral PRN    dextrose (D50W) injection syrg 12.5-25 g  12.5-25 g IntraVENous PRN    glucagon (GLUCAGEN) injection 1 mg  1 mg IntraMUSCular PRN    insulin lispro (HUMALOG) injection   SubCUTAneous AC&HS    prochlorperazine (COMPAZINE) with saline injection 5 mg  5 mg IntraVENous Q8H PRN    cholecalciferol (VITAMIN D3) tablet 1,000 Units  1,000 Units Oral DAILY    folic acid (FOLVITE) tablet 1 mg  1 mg Oral DAILY    magnesium oxide (MAG-OX) tablet 400 mg  400 mg Oral DAILY    therapeutic multivitamin (THERAGRAN) tablet 1 Tab  1 Tab Oral DAILY    fish oil-omega-3 fatty acids 340-1,000 mg capsule 1 Cap  1 Cap Oral DAILY    sodium chloride (NS) flush 5-10 mL  5-10 mL IntraVENous Q8H    sodium chloride (NS) flush 5-10 mL  5-10 mL IntraVENous PRN    ondansetron (ZOFRAN) injection 4 mg  4 mg IntraVENous Q4H PRN    levothyroxine (SYNTHROID) tablet 88 mcg  88 mcg Oral ACB ______________________________________________________________________  EXPECTED LENGTH OF STAY: 4d 21h  ACTUAL LENGTH OF STAY:          [address], MD

## 2018-05-04 NOTE — PROGRESS NOTES
-Hematology / Oncology ([facility]) -  -Primary Oncologist-  Dr Theodora Lynch  -CC- breast cancer    -S-  No new complaints    -O-      Patient Vitals for the past 24 hrs:   Temp Pulse Resp BP SpO2   05/04/18 0902 - - - - 98 %   05/04/18 0809 98.2 °F (36.8 °C) 88 26 163/70 98 %   05/04/18 0307 98 °F (36.7 °C) 87 22 138/47 98 %   05/04/18 0135 - - - - 98 %   05/03/18 2357 97.9 °F (36.6 °C) 92 22 130/43 98 %   05/03/18 2337 98.2 °F (36.8 °C) 95 25 123/42 99 %   05/03/18 2210 - 93 - 133/46 -   05/03/18 2044 - - - - 94 %   05/03/18 1551 97.9 °F (36.6 °C) 84 21 137/47 100 %   05/03/18 1549 - - - - 93 %   05/03/18 1203 98.5 °F (36.9 °C) 86 19 119/44 98 %        Gen: nad  Chest: bilateral breath sounds wheezy  Cardiac: rrr  Abd: s/nt    -Labs-    Recent Labs      05/04/18   0320  05/03/18   0446  05/02/18   0241   WBC  13.2*  16.7*  17.4*   HGB  9.5*  9.6*  9.5*   PLT  197  176  180   ANEU  10.5*  13.8*  14.1*   NA  138  137  138   K  3.9  4.2  3.8   GLU  180*  209*  184*   BUN  22*  24*  30*   CREA  0.83  0.78  1.01   ALT  28  19  19   SGOT  46*  36  44*   TBILI  0.5  0.5  0.6   AP  336*  325*  386*   CA  9.0  9.0  9.1   MG  2.0  1.5*  1.6   PHOS  2.5*  2.3*   --        -Assessment + Plan-     *) metastatic breast cancer. ER+, [facility]+, her-2 neg; bone scan: diffuse mets  ----- continue  Arimidex (hormonal AI therapy) started on 4/24  *) Anemia: suspect AOCD +- marrow involvement,  started procrit weekly on 4/23  ---- prbc x1 4/23, 4/26 . Stable  *) CHF. Karime Flor Per cardiology. .  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  Required bronchoscopy to clear secretions again 5/1 5/4- no new issues, reviewed plan with patient and family

## 2018-05-04 NOTE — PROGRESS NOTES
Met with patient at bedside while she was working with PT. Discussed with her briefly going to rehab at discharge. She would prefer for this CM to speak with her sons. I will reach out to [facility] [phone] tomorrow.     Suad Dudley RN CRM  Ext [phone]

## 2018-05-04 NOTE — PROGRESS NOTES
Problem: Self Care Deficits Care Plan (Adult)  Goal: *Acute Goals and Plan of Care (Insert Text)  Occupational Therapy Goals  Initiated 4/20/2018; Reviewed 4/30/18  1. Patient will perform grooming set up assist bedside level within 7 days. 2.  Patient will perform bathing seated with moderate assistance  within 7 day(s). 3.  Patient will perform upper body dressing with minimal assistance/contact guard assist within 7 day(s). 4.  Patient will perform toilet transfers with minimal assistance/contact guard assist within 7 day(s). 5.  Patient will participate in upper extremity therapeutic exercise/activities with supervision/set-up for 10 minutes within 7 day(s). 6.  Patient will utilize energy conservation techniques during functional activities with verbal cues within 7 day(s). Occupational Therapy TREATMENT  Patient: Collette Dodd (89 y.o. female)  Date: 5/4/2018  Diagnosis: SOB (shortness of breath)  colon  Anemia  COLON SOB (shortness of breath)    8 Days Post-Op  Precautions: Fall, DNR  Chart, occupational therapy assessment, plan of care, and goals were reviewed. ASSESSMENT:  Patient received in bed. Participated in bed mobility in rolling and to EOB with min to max assist of 1. Static and dynamic balance in sitting on EOB, fair to poor, requiring tactile cues to mod assist to maintain balance. Patient with a left (history of CVA) and posterior lean. She needed max to total assist to comb hair in sitting. Patient sat on edge of bed for approximately 4 min. Max assist to return to supine. Total assist of 2 to move to [facility] and reposition. Patient participated in 1 set of 10 reps of active exercise for straight leg raises, knee flexion, and shoulder flexion bilaterally. Focus on strengthening, seated balance for self care, increased mobility. Stable vitals during session on 2 liters of O2 via nasal cannula.  Performance impacted by impaired seated balance, impaired strength and endurance for functional activity, impaired coordination and motor planning of left UE, and need for assist for all mobility and self care. Per chart, at baseline, patient lives alone and has aides 6 to 8 hours a day, walks with a rollator. Recommend rehab. Recommend patient in modified chair position in bed 3 times a day, participating in self care with assist, and performing AROM exercises for UEs and LEs. Progression toward goals:  []       Improving appropriately and progressing toward goals  [x]       Improving slowly and progressing toward goals  []       Not making progress toward goals and plan of care will be adjusted     PLAN:  Patient continues to benefit from skilled intervention to address the above impairments. Continue treatment per established plan of care. Discharge Recommendations:  Rehab  Further Equipment Recommendations for Discharge:  none     SUBJECTIVE:   Patient stated I need my brush.     OBJECTIVE DATA SUMMARY:   Cognitive/Behavioral Status:  Neurologic State: Alert  Orientation Level: Oriented X4  Cognition: Decreased attention/concentration; Follows commands; Appropriate safety awareness  Perception: Cues to maintain midline in sitting; Tactile;Verbal  Perseveration: No perseveration noted  Safety/Judgement: Awareness of environment    Functional Mobility and Transfers for ADLs:  Bed Mobility:  Rolling: Minimum assistance;Assist x1 (to each side with tactile and verbal cues)  Supine to Sit: Moderate assistance;Assist x1 (on left side)  Sit to Supine: Maximum assistance;Assist x1    Transfers:             Balance:  Sitting: Impaired  Sitting - Static: Poor (constant support)  Sitting - Dynamic: Poor (constant support)    ADL Intervention:  Feeding  Drink to Mouth: Supervision/set-up    Grooming  Brushing/Combing Hair: Total assistance (dependent) (seated on EOB)         Lower Body Bathing  Bathing Assistance:  (inferred from mobility)  Perineal  : Total assistance (dependent)  Position Performed: Supine  Lower Body : Total assistance (dependent)  Position Performed: Seated edge of bed         Lower Body Dressing Assistance  Dressing Assistance: Total assistance(dependent) (inferred from mobility)  Leg Crossed Method Used: No  Position Performed: Seated edge of bed;Supine    Toileting  Bladder Hygiene: Total assistance (dependent) (Pure Wick)    Cognitive Retraining  Safety/Judgement: Awareness of environment          Activity Tolerance:   Fair  Please refer to the flowsheet for vital signs taken during this treatment.   After treatment:   [] Patient left in no apparent distress sitting up in chair  [x] Patient left in no apparent distress in bed  [x] Call bell left within reach  [x] Nursing notified  [] Caregiver present  [] Bed alarm activated    COMMUNICATION/COLLABORATION:   The patients plan of care was discussed with: Registered Nurse    ENEDELIA Murray  Time Calculation: 28 mins

## 2018-05-04 NOTE — PROGRESS NOTES
Labs reviewed  JEROD resolved, Cr stable  BP stable  Will sign off  Please call us back with any questions        Maico De Leon MD  [facility]   [address] [facility] [address]  Phone - [phone]   Fax - [phone]  www. [facility]

## 2018-05-04 NOTE — PROGRESS NOTES
Problem: Mobility Impaired (Adult and Pediatric)  Goal: *Acute Goals and Plan of Care (Insert Text)  Physical Therapy Goals  Initiated 4/30/2018  1. Patient will move from supine to sit and sit to supine , scoot up and down and roll side to side in bed with moderate assistance  within 7 day(s). 2.  Patient will transfer from bed to chair and chair to bed with moderate assistance  using the least restrictive device within 7 day(s). 3.  Patient will perform sit to stand with moderate assistance  within 7 day(s). 4.  Patient will ambulate with moderate assistance  for 25 feet with the least restrictive device within 7 day(s). 5.  Patient will tolerate OOB in chair x 30 minutes for improved activity tolerance within 7 days. Physical Therapy Goals  Initiated 4/20/2018  1. Patient will move from supine to sit and sit to supine , scoot up and down and roll side to side in bed with minimal assistance/contact guard assist within 7 day(s). 2.  Patient will transfer from bed to chair and chair to bed with minimal assistance/contact guard assist using the least restrictive device within 7 day(s). 3.  Patient will perform sit to stand with minimal assistance/contact guard assist within 7 day(s). 4.  Patient will ambulate with minimal assistance/contact guard assist for 25 feet with the least restrictive device within 7 day(s). 5.  Patient will ascend/descend 3 stairs with 2 handrail(s) with minimal assistance/contact guard assist within 7 day(s). physical Therapy TREATMENT  Patient: Darrick England (47 y.o. female)  Date: 5/4/2018  Diagnosis: SOB (shortness of breath)  colon  Anemia  COLON SOB (shortness of breath)    8 Days Post-Op  Precautions: Fall, DNR  Chart, physical therapy assessment, plan of care and goals were reviewed. ASSESSMENT:  Pt received asleep supine in bed but easily awakened;son present throughout treatment.  Pt mobility beginning to improve as pt able to stand w/ Mod Ax2 and take sidesteps (to left) from FOB to foot of transport stretcher (transport team). Pt stood once more at stretcher to take additional side steps (Left) to HOB (Mod A x2). Continues to require Mod-Max A for bed mobility using log roll technique. Pt positioned to comfort on stretcher, remained in transport team care w/ son present for transfer to  (207/1). RN on  aware of pt current mobility progress. Noted pt static sitting balance beginning to improve as pt able to maintain midline sit EOB w/ CGA-Min A. Will continue to follow for progression of mobility as able and appropriate. Progression toward goals:  [x]    Improving appropriately and progressing toward goals  []    Improving slowly and progressing toward goals  []    Not making progress toward goals and plan of care will be adjusted     PLAN:  Patient continues to benefit from skilled intervention to address the above impairments. Continue treatment per established plan of care. Discharge Recommendations:  Rehab  Further Equipment Recommendations for Discharge:  TBD     SUBJECTIVE:   Patient stated That's too far. referring to transport stretcher prior to transfer. OBJECTIVE DATA SUMMARY:   Critical Behavior:  Neurologic State: Alert  Orientation Level: Oriented X4  Cognition: Decreased attention/concentration, Follows commands, Appropriate safety awareness  Safety/Judgement: Awareness of environment  Functional Mobility Training:  Bed Mobility:  Rolling: Moderate assistance;Maximum assistance; Additional time;Assist x2 (pt intiating roll to right; physical assist to complete)  Supine to Sit: Maximum assistance;Assist x2 (physical assist at shoulders/trunk mainly)  Sit to Supine: Maximum assistance;Assist x2           Transfers:  Sit to Stand:  Moderate assistance;Assist x2                                Balance:  Sitting: Impaired  Sitting - Static: Fair (occasional)  Sitting - Dynamic: Fair (occasional)  Standing: Impaired  Standing - Static: Constant support; Fair  Standing - Dynamic : Fair  Ambulation/Gait Training:  Distance (ft): 5 Feet (ft) (side steps to left (bed>stretcher))  Assistive Device: Gait belt  Ambulation - Level of Assistance: Moderate assistance;Assist x2; Other (comment)        Gait Abnormalities: Decreased step clearance;Shuffling gait; Step to gait        Base of Support: Widened     Speed/Haylee: Shuffled; Slow  Step Length: Left shortened;Right shortened                Stairs:              Neuro Re-Education:    Therapeutic Exercises:   Reviewed: Heel slides, glut sets, knee press, ankle pumps  Pain:  Pain Scale 1: Visual (pt sleeping)  Pain Intensity 1: 0              Activity Tolerance:   Limited. Please refer to the flowsheet for vital signs taken during this treatment.   After treatment:   []    Patient left in no apparent distress sitting up in chair  [x]    Patient left in no apparent distress in bed (stretcher)  [x]    Call bell left within reach  [x]    Nursing notified  []    Caregiver present  []    Bed alarm activated    COMMUNICATION/COLLABORATION:   The patients plan of care was discussed with: Registered Nurse    Filomena Stratton PTA   Time Calculation: 19 mins

## 2018-05-04 NOTE — DIABETES MGMT
DTC Progress Note    Recommendations/ Comments: Chart reviewed due to hyperglycemia. PO intake low  Pt is a DNR; acceptable to liberalize BG goals    As disused in rounds, Lantus increased to 10 units today.      Current hospital DM medication: Lantus 8 units daily  - will increase to 10 units                                                      correction scale Humalog, normal sensitivity.      Chart reviewed on Sandy Ledesma.     Patient is a 82 y.o. female with known Type 2 Diabetes on Januvia 50 mg/d; Amaryl 1 mg/d; Metformin 1000 mg BID at home. A1c:   Lab Results   Component Value Date/Time    Hemoglobin A1c 7.0 (H) 04/18/2018 08:14 AM    Hemoglobin A1c 6.9 (H) 11/30/2011 08:30 AM       Recent Glucose Results:   Lab Results   Component Value Date/Time     (H) 05/04/2018 03:20 AM    GLUCPOC 196 (H) 05/04/2018 12:05 PM    GLUCPOC 156 (H) 05/04/2018 06:37 AM    GLUCPOC 186 (H) 05/03/2018 09:52 PM        Lab Results   Component Value Date/Time    Creatinine 0.83 05/04/2018 03:20 AM     Estimated Creatinine Clearance: 44.9 mL/min (based on Cr of 0.83). Active Orders   Diet    DIET CARDIAC Regular; 2 GM NA (House Low NA)        PO intake:   Patient Vitals for the past 72 hrs:   % Diet Eaten   05/04/18 0900 50 %   05/02/18 1903 10 %   05/02/18 0800 40 %   05/01/18 1800 25 %       Will continue to follow as needed.     Thank you  Talia Jaquez RN, CDE

## 2018-05-04 NOTE — PROGRESS NOTES
Problem: Pressure Injury - Risk of  Goal: *Prevention of pressure ulcer  Outcome: Progressing Towards Goal  Skin assessment completed and also worked with turn team to reposition patient approx. Every two hours. Problem: Sepsis: Discharge Outcomes  Goal: *Vital signs within defined limits  Outcome: Progressing Towards Goal  VS within patient's normal range. Goal: *Lungs clear or at baseline  Outcome: Not Progressing Towards Goal  Noted coarse lung sounds and productive cough. Breathing TX administered as scheduled. Goal: *Oxygen saturation returns to baseline or 90% or better on room air  Outcome: Progressing Towards Goal  Patient maintain stable oxygen saturation >95% on RA  Goal: *Optimal pain control at patient's stated goal  Outcome: Progressing Towards Goal  Denied of pain and continues to frequently assessment for pain with hourly roundings. 8004- Bedside shift change report given to ARIANA Parrish (oncoming nurse) by Kayla Brandon (offgoing nurse). Report included the following information SBAR, Kardex, ED Summary, Procedure Summary, Intake/Output, Recent Results, Med Rec Status and Cardiac Rhythm NSR.

## 2018-05-04 NOTE — PROGRESS NOTES
05/04/18 1629   Vital Signs   Temp 97.6 °F (36.4 °C)   Temp Source Oral   Pulse (Heart Rate) (!) 118   Heart Rate Source Monitor   Resp Rate (!) 36   O2 Sat (%) 100 %   Level of Consciousness Alert   /90   MAP (Calculated) 119   BP 1 Method Automatic   BP 1 Location Left leg   BP Patient Position At rest   MEWS Score 5   Oxygen Therapy   O2 Device Nasal cannula   O2 Flow Rate (L/min) 2 l/min   notified MD.  Will re-check vitals

## 2018-05-05 ENCOUNTER — APPOINTMENT (OUTPATIENT)
Dept: GENERAL RADIOLOGY | Age: 83
DRG: 853 | End: 2018-05-05
Attending: SURGERY
Payer: MEDICARE

## 2018-05-05 ENCOUNTER — APPOINTMENT (OUTPATIENT)
Dept: ULTRASOUND IMAGING | Age: 83
DRG: 853 | End: 2018-05-05
Attending: HOSPITALIST
Payer: MEDICARE

## 2018-05-05 LAB
ALBUMIN SERPL-MCNC: 1.9 G/DL (ref 3.5–5)
ALBUMIN/GLOB SERPL: 0.4 {RATIO} (ref 1.1–2.2)
ALP SERPL-CCNC: 417 U/L (ref 45–117)
ALT SERPL-CCNC: 28 U/L (ref 12–78)
ANION GAP SERPL CALC-SCNC: 8 MMOL/L (ref 5–15)
ARTERIAL PATENCY WRIST A: YES
AST SERPL-CCNC: 43 U/L (ref 15–37)
BASE EXCESS BLD CALC-SCNC: 9 MMOL/L
BASOPHILS # BLD: 0.1 K/UL (ref 0–0.1)
BASOPHILS NFR BLD: 1 % (ref 0–1)
BDY SITE: ABNORMAL
BILIRUB SERPL-MCNC: 0.5 MG/DL (ref 0.2–1)
BODY FLD TYPE: NORMAL
BUN SERPL-MCNC: 23 MG/DL (ref 6–20)
BUN/CREAT SERPL: 26 (ref 12–20)
CALCIUM SERPL-MCNC: 8.9 MG/DL (ref 8.5–10.1)
CHLORIDE SERPL-SCNC: 100 MMOL/L (ref 97–108)
CO2 SERPL-SCNC: 29 MMOL/L (ref 21–32)
CREAT SERPL-MCNC: 0.87 MG/DL (ref 0.55–1.02)
DIFFERENTIAL METHOD BLD: ABNORMAL
EOSINOPHIL # BLD: 0.2 K/UL (ref 0–0.4)
EOSINOPHIL NFR BLD: 2 % (ref 0–7)
ERYTHROCYTE [DISTWIDTH] IN BLOOD BY AUTOMATED COUNT: 22.1 % (ref 11.5–14.5)
GAS FLOW.O2 O2 DELIVERY SYS: ABNORMAL L/MIN
GAS FLOW.O2 SETTING OXYMISER: 4 L/M
GLOBULIN SER CALC-MCNC: 4.6 G/DL (ref 2–4)
GLUCOSE BLD STRIP.AUTO-MCNC: 122 MG/DL (ref 65–100)
GLUCOSE BLD STRIP.AUTO-MCNC: 137 MG/DL (ref 65–100)
GLUCOSE BLD STRIP.AUTO-MCNC: 146 MG/DL (ref 65–100)
GLUCOSE BLD STRIP.AUTO-MCNC: 196 MG/DL (ref 65–100)
GLUCOSE SERPL-MCNC: 201 MG/DL (ref 65–100)
HCO3 BLD-SCNC: 32.4 MMOL/L (ref 22–26)
HCT VFR BLD AUTO: 31 % (ref 35–47)
HGB BLD-MCNC: 9.8 G/DL (ref 11.5–16)
IMM GRANULOCYTES # BLD: 0.1 K/UL (ref 0–0.04)
IMM GRANULOCYTES NFR BLD AUTO: 1 % (ref 0–0.5)
LDH FLD L TO P-CCNC: 100 U/L
LYMPHOCYTES # BLD: 1.4 K/UL (ref 0.8–3.5)
LYMPHOCYTES NFR BLD: 12 % (ref 12–49)
MCH RBC QN AUTO: 29.3 PG (ref 26–34)
MCHC RBC AUTO-ENTMCNC: 31.6 G/DL (ref 30–36.5)
MCV RBC AUTO: 92.5 FL (ref 80–99)
MONOCYTES # BLD: 1.2 K/UL (ref 0–1)
MONOCYTES NFR BLD: 10 % (ref 5–13)
NEUTS SEG # BLD: 8.6 K/UL (ref 1.8–8)
NEUTS SEG NFR BLD: 74 % (ref 32–75)
NRBC # BLD: 0.03 K/UL (ref 0–0.01)
NRBC BLD-RTO: 0.3 PER 100 WBC
O2/TOTAL GAS SETTING VFR VENT: 28 %
PCO2 BLD: 41.5 MMHG (ref 35–45)
PH BLD: 7.5 [PH] (ref 7.35–7.45)
PH FLD: 7.6 [PH]
PHOSPHATE SERPL-MCNC: 3.3 MG/DL (ref 2.6–4.7)
PLATELET # BLD AUTO: 223 K/UL (ref 150–400)
PMV BLD AUTO: 10.3 FL (ref 8.9–12.9)
PO2 BLD: 75 MMHG (ref 80–100)
POTASSIUM SERPL-SCNC: 3.9 MMOL/L (ref 3.5–5.1)
PROT FLD-MCNC: 2.5 G/DL
PROT SERPL-MCNC: 6.5 G/DL (ref 6.4–8.2)
RBC # BLD AUTO: 3.35 M/UL (ref 3.8–5.2)
RBC MORPH BLD: ABNORMAL
SAO2 % BLD: 96 % (ref 92–97)
SERVICE CMNT-IMP: ABNORMAL
SODIUM SERPL-SCNC: 137 MMOL/L (ref 136–145)
SPECIMEN SOURCE FLD: NORMAL
SPECIMEN SOURCE FLD: NORMAL
SPECIMEN TYPE: ABNORMAL
TOTAL RESP. RATE, ITRR: 18
WBC # BLD AUTO: 11.6 K/UL (ref 3.6–11)
WBC MORPH BLD: ABNORMAL

## 2018-05-05 PROCEDURE — 80053 COMPREHEN METABOLIC PANEL: CPT | Performed by: HOSPITALIST

## 2018-05-05 PROCEDURE — 84100 ASSAY OF PHOSPHORUS: CPT | Performed by: HOSPITALIST

## 2018-05-05 PROCEDURE — 83986 ASSAY PH BODY FLUID NOS: CPT | Performed by: HOSPITALIST

## 2018-05-05 PROCEDURE — 32555 ASPIRATE PLEURA W/ IMAGING: CPT

## 2018-05-05 PROCEDURE — 87102 FUNGUS ISOLATION CULTURE: CPT | Performed by: HOSPITALIST

## 2018-05-05 PROCEDURE — 74018 RADEX ABDOMEN 1 VIEW: CPT

## 2018-05-05 PROCEDURE — 84157 ASSAY OF PROTEIN OTHER: CPT | Performed by: HOSPITALIST

## 2018-05-05 PROCEDURE — C1729 CATH, DRAINAGE: HCPCS

## 2018-05-05 PROCEDURE — 94640 AIRWAY INHALATION TREATMENT: CPT

## 2018-05-05 PROCEDURE — 85025 COMPLETE CBC W/AUTO DIFF WBC: CPT | Performed by: HOSPITALIST

## 2018-05-05 PROCEDURE — 74011000250 HC RX REV CODE- 250: Performed by: RADIOLOGY

## 2018-05-05 PROCEDURE — 88305 TISSUE EXAM BY PATHOLOGIST: CPT | Performed by: INTERNAL MEDICINE

## 2018-05-05 PROCEDURE — 87205 SMEAR GRAM STAIN: CPT | Performed by: HOSPITALIST

## 2018-05-05 PROCEDURE — 74011636637 HC RX REV CODE- 636/637: Performed by: HOSPITALIST

## 2018-05-05 PROCEDURE — 36415 COLL VENOUS BLD VENIPUNCTURE: CPT | Performed by: HOSPITALIST

## 2018-05-05 PROCEDURE — 82962 GLUCOSE BLOOD TEST: CPT

## 2018-05-05 PROCEDURE — 71045 X-RAY EXAM CHEST 1 VIEW: CPT

## 2018-05-05 PROCEDURE — 74011250637 HC RX REV CODE- 250/637: Performed by: HOSPITALIST

## 2018-05-05 PROCEDURE — 82803 BLOOD GASES ANY COMBINATION: CPT

## 2018-05-05 PROCEDURE — 74011000258 HC RX REV CODE- 258: Performed by: HOSPITALIST

## 2018-05-05 PROCEDURE — 65660000000 HC RM CCU STEPDOWN

## 2018-05-05 PROCEDURE — 74011250636 HC RX REV CODE- 250/636: Performed by: HOSPITALIST

## 2018-05-05 PROCEDURE — 83615 LACTATE (LD) (LDH) ENZYME: CPT | Performed by: HOSPITALIST

## 2018-05-05 PROCEDURE — 36600 WITHDRAWAL OF ARTERIAL BLOOD: CPT

## 2018-05-05 PROCEDURE — 74011000250 HC RX REV CODE- 250: Performed by: INTERNAL MEDICINE

## 2018-05-05 PROCEDURE — 77010033678 HC OXYGEN DAILY

## 2018-05-05 PROCEDURE — 74011250637 HC RX REV CODE- 250/637: Performed by: INTERNAL MEDICINE

## 2018-05-05 PROCEDURE — 74011250636 HC RX REV CODE- 250/636: Performed by: INTERNAL MEDICINE

## 2018-05-05 PROCEDURE — 74011000250 HC RX REV CODE- 250: Performed by: HOSPITALIST

## 2018-05-05 PROCEDURE — 88112 CYTOPATH CELL ENHANCE TECH: CPT | Performed by: INTERNAL MEDICINE

## 2018-05-05 PROCEDURE — 0W9B3ZZ DRAINAGE OF LEFT PLEURAL CAVITY, PERCUTANEOUS APPROACH: ICD-10-PCS | Performed by: RADIOLOGY

## 2018-05-05 PROCEDURE — 74011250637 HC RX REV CODE- 250/637: Performed by: NURSE PRACTITIONER

## 2018-05-05 RX ORDER — LIDOCAINE HYDROCHLORIDE 10 MG/ML
5 INJECTION, SOLUTION EPIDURAL; INFILTRATION; INTRACAUDAL; PERINEURAL
Status: COMPLETED | OUTPATIENT
Start: 2018-05-05 | End: 2018-05-05

## 2018-05-05 RX ORDER — DEXTROSE AND POTASSIUM CHLORIDE 5; .15 G/100ML; G/100ML
SOLUTION INTRAVENOUS CONTINUOUS
Status: DISCONTINUED | OUTPATIENT
Start: 2018-05-05 | End: 2018-05-09

## 2018-05-05 RX ORDER — LEVOFLOXACIN 5 MG/ML
750 INJECTION, SOLUTION INTRAVENOUS
Status: COMPLETED | OUTPATIENT
Start: 2018-05-06 | End: 2018-05-10

## 2018-05-05 RX ORDER — FUROSEMIDE 10 MG/ML
40 INJECTION INTRAMUSCULAR; INTRAVENOUS DAILY
Status: DISCONTINUED | OUTPATIENT
Start: 2018-05-05 | End: 2018-05-06

## 2018-05-05 RX ORDER — INSULIN GLARGINE 100 [IU]/ML
15 INJECTION, SOLUTION SUBCUTANEOUS DAILY
Status: DISCONTINUED | OUTPATIENT
Start: 2018-05-05 | End: 2018-05-06

## 2018-05-05 RX ADMIN — LISINOPRIL 5 MG: 5 TABLET ORAL at 09:35

## 2018-05-05 RX ADMIN — SPIRONOLACTONE 25 MG: 25 TABLET, FILM COATED ORAL at 09:35

## 2018-05-05 RX ADMIN — INSULIN GLARGINE 15 UNITS: 100 INJECTION, SOLUTION SUBCUTANEOUS at 12:07

## 2018-05-05 RX ADMIN — ANASTROZOLE 1 MG: 1 TABLET, COATED ORAL at 09:34

## 2018-05-05 RX ADMIN — INSULIN LISPRO 2 UNITS: 100 INJECTION, SOLUTION INTRAVENOUS; SUBCUTANEOUS at 07:22

## 2018-05-05 RX ADMIN — FUROSEMIDE 40 MG: 10 INJECTION, SOLUTION INTRAMUSCULAR; INTRAVENOUS at 09:34

## 2018-05-05 RX ADMIN — MEROPENEM 500 MG: 500 INJECTION, POWDER, FOR SOLUTION INTRAVENOUS at 02:48

## 2018-05-05 RX ADMIN — DEXTROSE MONOHYDRATE AND POTASSIUM CHLORIDE: 5; .149 INJECTION, SOLUTION INTRAVENOUS at 09:35

## 2018-05-05 RX ADMIN — MEROPENEM 500 MG: 500 INJECTION, POWDER, FOR SOLUTION INTRAVENOUS at 18:31

## 2018-05-05 RX ADMIN — CARVEDILOL 6.25 MG: 6.25 TABLET, FILM COATED ORAL at 09:35

## 2018-05-05 RX ADMIN — IPRATROPIUM BROMIDE AND ALBUTEROL SULFATE 3 ML: .5; 3 SOLUTION RESPIRATORY (INHALATION) at 13:15

## 2018-05-05 RX ADMIN — Medication 10 ML: at 21:16

## 2018-05-05 RX ADMIN — ACETYLCYSTEINE 200 MG: 200 SOLUTION ORAL; RESPIRATORY (INHALATION) at 08:57

## 2018-05-05 RX ADMIN — Medication 20 ML: at 12:07

## 2018-05-05 RX ADMIN — LIDOCAINE HYDROCHLORIDE 5 ML: 10 INJECTION, SOLUTION EPIDURAL; INFILTRATION; INTRACAUDAL; PERINEURAL at 11:13

## 2018-05-05 RX ADMIN — MEROPENEM 500 MG: 500 INJECTION, POWDER, FOR SOLUTION INTRAVENOUS at 10:56

## 2018-05-05 RX ADMIN — CARVEDILOL 6.25 MG: 6.25 TABLET, FILM COATED ORAL at 17:25

## 2018-05-05 RX ADMIN — ACETYLCYSTEINE 200 MG: 200 SOLUTION ORAL; RESPIRATORY (INHALATION) at 13:15

## 2018-05-05 RX ADMIN — IPRATROPIUM BROMIDE AND ALBUTEROL SULFATE 3 ML: .5; 3 SOLUTION RESPIRATORY (INHALATION) at 08:57

## 2018-05-05 RX ADMIN — INSULIN LISPRO 2 UNITS: 100 INJECTION, SOLUTION INTRAVENOUS; SUBCUTANEOUS at 12:06

## 2018-05-05 RX ADMIN — Medication 10 ML: at 12:07

## 2018-05-05 RX ADMIN — BUDESONIDE 500 MCG: 0.5 INHALANT RESPIRATORY (INHALATION) at 08:57

## 2018-05-05 NOTE — PROGRESS NOTES
Cardiology Progress Note            Admit Date: 4/16/2018  Admit Diagnosis: SOB (shortness of breath);colon; Anemia;COLON  Date: 5/5/2018     Time: 2:01 PM    Subjective:   States she is hungry. Still with some SOB but overall feels a little better. Denies chest pain. Assessment and Plan     1. NSTEMI:     -No chest pain. -Repeat TTE:4/27/18  EF unchanged 45% with mild hypokinesis  basal-mid anteroseptal and apical walls. -ASA. Continue off Plavix   -Continue coreg 6.25 mg BID   -Statin intolerant due to weakness on zocor, on repatha, last LDL 31, so will not rechallenge statin. On fish-oil.   -might consider LHC next week if pt continues to improve       2. Cardiomyopathy/Acute HFrEF, new:  EF 45% NYHA IV     -Lisinopril 5 mg po daily (watch creatinine). -Coreg to 6.25 mg BID today.   -Daily weights, I/Os   -Continue aldactone.    -Will add low dose po Lasix 20 mg daily. - Monitor labs        3. JEROD on CKD:   Creatinine normalized. GFR >60   -Nephrology following. 4. Acute hypoxic respiratory failure:multifactorial.  On room air today- sats 98%   -difficulty managing secretions   -Spironolactone   -Add Lasix 40 mg po daily   -CXR today     5. Anemia:hgb stable 9.5 On procrit. 6. HTN- BP labile.   -Coreg to 6.25 mg BID (was increased 5/1 PM)   -Hydralazine 25 mg TID   -Low dose ACE-I    7. Leukocytosis/Ecoli UTI/PNA:  ID following WBC trending down. 8. Hx of Colon cancer, now metastatic breast ca (bone, liver,calvarium mets) on Arimedex  -Hematology oncology following. On Arimesex. 9. Advanced directives:  No shock or CPR, DNR Palliative care following. 10. Deconditioned:  Very weak. Will need PT and rehab-     May have had thoracentesis today, to get NG tube for distention, breath sounds still suggest difficulties clearing airways. Afshan Coley MD 0900.     Cardiology Attending:Patient seen and examined. I agree with NP assessment and plans. Very fragile. Reid Hare MD 5/5/2018 12:23 PM             Objective:      Physical Exam:                Visit Vitals    /60 (BP 1 Location: Left arm, BP Patient Position: At rest)    Pulse 87    Temp 98 °F (36.7 °C)    Resp 18    Ht 5' 1\" (1.549 m)    Wt 63 kg (138 lb 14.4 oz)    SpO2 100%    BMI 26.24 kg/m2          General Appearance:   elderly female, NAD, on RA   Ears/Nose/Mouth/Throat:    Hearing grossly normal.         Neck:  Supple. Chest:     Course breath sounds bilaterally. Mildly Decreased breath sounds on left. Cardiovascular:   Regular rate and rhythm, S1, S2 normal, no murmur   Abdomen:    Soft, nontender, no gaurding. Extremities:  No edema bilaterally. Skin:  Warm and dry. Telemetry: Sinus rhythm-       Data Review:    Labs:    Recent Results (from the past 24 hour(s))   GLUCOSE, POC    Collection Time: 05/04/18 12:05 PM   Result Value Ref Range    Glucose (POC) 196 (H) 65 - 100 mg/dL    Performed by Magan Conde    GLUCOSE, POC    Collection Time: 05/04/18  4:35 PM   Result Value Ref Range    Glucose (POC) 262 (H) 65 - 100 mg/dL    Performed by Malik Nielsen    GLUCOSE, POC    Collection Time: 05/04/18 10:51 PM   Result Value Ref Range    Glucose (POC) 188 (H) 65 - 100 mg/dL    Performed by [facility]    METABOLIC PANEL, COMPREHENSIVE    Collection Time: 05/05/18  3:34 AM   Result Value Ref Range    Sodium 137 136 - 145 mmol/L    Potassium 3.9 3.5 - 5.1 mmol/L    Chloride 100 97 - 108 mmol/L    CO2 29 21 - 32 mmol/L    Anion gap 8 5 - 15 mmol/L    Glucose 201 (H) 65 - 100 mg/dL    BUN 23 (H) 6 - 20 MG/DL    Creatinine 0.87 0.55 - 1.02 MG/DL    BUN/Creatinine ratio 26 (H) 12 - 20      GFR est AA >60 >60 ml/min/1.73m2    GFR est non-AA >60 >60 ml/min/1.73m2    Calcium 8.9 8.5 - 10.1 MG/DL    Bilirubin, total 0.5 0.2 - 1.0 MG/DL    ALT (SGPT) 28 12 - 78 U/L    AST (SGOT) 43 (H) 15 - 37 U/L    Alk.  phosphatase 417 (H) 45 - 117 U/L    Protein, total 6.5 6.4 - 8.2 g/dL    Albumin 1.9 (L) 3.5 - 5.0 g/dL    Globulin 4.6 (H) 2.0 - 4.0 g/dL    A-G Ratio 0.4 (L) 1.1 - 2.2     CBC WITH AUTOMATED DIFF    Collection Time: 05/05/18  3:34 AM   Result Value Ref Range    WBC 11.6 (H) 3.6 - 11.0 K/uL    RBC 3.35 (L) 3.80 - 5.20 M/uL    HGB 9.8 (L) 11.5 - 16.0 g/dL    HCT 31.0 (L) 35.0 - 47.0 %    MCV 92.5 80.0 - 99.0 FL    MCH 29.3 26.0 - 34.0 PG    MCHC 31.6 30.0 - 36.5 g/dL    RDW 22.1 (H) 11.5 - 14.5 %    PLATELET 886 477 - 624 K/uL    MPV 10.3 8.9 - 12.9 FL    NRBC 0.3 (H) 0  WBC    ABSOLUTE NRBC 0.03 (H) 0.00 - 0.01 K/uL    NEUTROPHILS 74 32 - 75 %    LYMPHOCYTES 12 12 - 49 %    MONOCYTES 10 5 - 13 %    EOSINOPHILS 2 0 - 7 %    BASOPHILS 1 0 - 1 %    IMMATURE GRANULOCYTES 1 (H) 0.0 - 0.5 %    ABS. NEUTROPHILS 8.6 (H) 1.8 - 8.0 K/UL    ABS. LYMPHOCYTES 1.4 0.8 - 3.5 K/UL    ABS. MONOCYTES 1.2 (H) 0.0 - 1.0 K/UL    ABS. EOSINOPHILS 0.2 0.0 - 0.4 K/UL    ABS. BASOPHILS 0.1 0.0 - 0.1 K/UL    ABS. IMM. GRANS. 0.1 (H) 0.00 - 0.04 K/UL    DF SMEAR SCANNED      RBC COMMENTS ANISOCYTOSIS  2+        RBC COMMENTS OVALOCYTES  PRESENT        RBC COMMENTS POLYCHROMASIA  1+        RBC COMMENTS TARGET CELLS  PRESENT        WBC COMMENTS HYPERSEGMENTED POLYS     PHOSPHORUS    Collection Time: 05/05/18  3:34 AM   Result Value Ref Range    Phosphorus 3.3 2.6 - 4.7 MG/DL   POC G3 - PUL    Collection Time: 05/05/18  3:49 AM   Result Value Ref Range    FIO2 (POC) 28 %    pH (POC) 7.501 (H) 7.35 - 7.45      pCO2 (POC) 41.5 35.0 - 45.0 MMHG    pO2 (POC) 75 (L) 80 - 100 MMHG    HCO3 (POC) 32.4 (H) 22 - 26 MMOL/L    sO2 (POC) 96 92 - 97 %    Base excess (POC) 9 mmol/L    Site RIGHT RADIAL      Device: NASAL CANNULA      Flow rate (POC) 4 L/M    Allens test (POC) YES      Specimen type (POC) ARTERIAL      Total resp.  rate 18     GLUCOSE, POC    Collection Time: 05/05/18  6:48 AM   Result Value Ref Range    Glucose (POC) 196 (H) 65 - 100 mg/dL    Performed by Zenia Chandler    GLUCOSE, POC    Collection Time: 05/05/18 11:31 AM   Result Value Ref Range    Glucose (POC) 146 (H) 65 - 100 mg/dL    Performed by Mariam Coleman           Radiology:        Current Facility-Administered Medications   Medication Dose Route Frequency    dextrose 5% with KCl 20 mEq/L infusion   IntraVENous CONTINUOUS    [START ON 5/6/2018] levoFLOXacin (LEVAQUIN) 750 mg in D5W IVPB  750 mg IntraVENous Q48H    furosemide (LASIX) injection 40 mg  40 mg IntraVENous DAILY    insulin glargine (LANTUS) injection 15 Units  15 Units SubCUTAneous DAILY    bisacodyl (DULCOLAX) tablet 5 mg  5 mg Oral DAILY    lactulose (CHRONULAC) solution 10 g  15 mL Oral BID    phosphorus (K PHOS NEUTRAL) 250 mg tablet 1 Tab  1 Tab Oral BID    [START ON 5/8/2018] levothyroxine (SYNTHROID) injection 44 mcg  44 mcg IntraVENous Q24H    lisinopril (PRINIVIL, ZESTRIL) tablet 5 mg  5 mg Oral DAILY    albuterol-ipratropium (DUO-NEB) 2.5 MG-0.5 MG/3 ML  3 mL Nebulization Q6H RT    acetylcysteine (MUCOMYST) 200 mg/mL (20 %) solution 200 mg  200 mg Nebulization TID RT    lactobac ac& pc-s.therm-b.anim (BERYL Q/RISAQUAD)  1 Cap Oral DAILY    ascorbic acid (vitamin C) (VITAMIN C) tablet 1,000 mg  1,000 mg Oral DAILY    polyethylene glycol (MIRALAX) packet 17 g  17 g Oral DAILY    naloxone (NARCAN) injection 0.4 mg  0.4 mg IntraVENous Multiple    flumazenil (ROMAZICON) 0.1 mg/mL injection 0.2 mg  0.2 mg IntraVENous Multiple    midazolam (VERSED) injection 0.25-5 mg  0.25-5 mg IntraVENous Multiple    carvedilol (COREG) tablet 6.25 mg  6.25 mg Oral BID WITH MEALS    meropenem (MERREM) 500 mg in 0.9% sodium chloride (MBP/ADV) 50 mL  0.5 g IntraVENous Q8H    aspirin chewable tablet 81 mg  81 mg Oral DAILY    spironolactone (ALDACTONE) tablet 25 mg  25 mg Oral DAILY    acetaminophen (TYLENOL) suppository 650 mg  650 mg Rectal Q4H PRN    sodium chloride (NS) flush 5-10 mL  5-10 mL IntraVENous Q8H    sodium chloride (NS) flush 5-10 mL  5-10 mL IntraVENous PRN    budesonide (PULMICORT) 500 mcg/2 ml nebulizer suspension  500 mcg Nebulization BID RT    hydrALAZINE (APRESOLINE) 20 mg/mL injection 10 mg  10 mg IntraVENous Q6H PRN    0.9% sodium chloride infusion 250 mL  250 mL IntraVENous PRN    sodium chloride (NS) flush 20 mL  20 mL InterCATHeter PRN    sodium chloride (NS) flush 10 mL  10 mL InterCATHeter Q24H    sodium chloride (NS) flush 10 mL  10 mL InterCATHeter PRN    sodium chloride (NS) flush 10 mL  10 mL InterCATHeter Q8H    alteplase (CATHFLO) 1 mg in sterile water (preservative free) 1 mL injection  1 mg InterCATHeter PRN    bacitracin 500 unit/gram packet 1 Packet  1 Packet Topical PRN    sodium chloride (NS) flush 10-30 mL  10-30 mL InterCATHeter PRN    sodium chloride (NS) flush 10 mL  10 mL InterCATHeter Q24H    sodium chloride (NS) flush 10 mL  10 mL InterCATHeter PRN    sodium chloride (NS) flush 10-40 mL  10-40 mL InterCATHeter Q8H    sodium chloride (NS) flush 20 mL  20 mL InterCATHeter Q24H    anastrozole (ARIMIDEX) tablet 1 mg  1 mg Oral DAILY    epoetin celio (EPOGEN;PROCRIT) injection 10,000 Units  10,000 Units SubCUTAneous Q MON, WED & FRI    albuterol-ipratropium (DUO-NEB) 2.5 MG-0.5 MG/3 ML  3 mL Nebulization Q6H PRN    glucose chewable tablet 16 g  4 Tab Oral PRN    dextrose (D50W) injection syrg 12.5-25 g  12.5-25 g IntraVENous PRN    glucagon (GLUCAGEN) injection 1 mg  1 mg IntraMUSCular PRN    insulin lispro (HUMALOG) injection   SubCUTAneous AC&HS    prochlorperazine (COMPAZINE) with saline injection 5 mg  5 mg IntraVENous Q8H PRN    cholecalciferol (VITAMIN D3) tablet 1,000 Units  1,000 Units Oral DAILY    folic acid (FOLVITE) tablet 1 mg  1 mg Oral DAILY    magnesium oxide (MAG-OX) tablet 400 mg  400 mg Oral DAILY    therapeutic multivitamin (THERAGRAN) tablet 1 Tab  1 Tab Oral DAILY    fish oil-omega-3 fatty acids 340-1,000 mg capsule 1 Cap  1 Cap Oral DAILY    sodium chloride (NS) flush 5-10 mL  5-10 mL IntraVENous Q8H    sodium chloride (NS) flush 5-10 mL  5-10 mL IntraVENous PRN    ondansetron (ZOFRAN) injection 4 mg  4 mg IntraVENous Q4H PRN     Pt critically ill, poor prognosis, multiple severe issues.        Suzanne Thomas MD     [facility] [address] [place] [address] 13, [address] [facility] [address]   [facility], [address]   [phone]

## 2018-05-05 NOTE — PROGRESS NOTES
Pt seen and evaluated after returning page from nurse who notes that patient sounded congested. I reviewed CT chest results as follows:  THORAX:     1. Large left pleural effusion and complete atelectasis of the left lung  resulting in opacification of the left hemithorax. 2. Moderate-sized right pleural effusion and complete atelectasis of the right  lower lobe. 3. Small pericardial effusion. 4. Widespread metastatic disease of the skeletal structures as described above.     ABDOMEN/PELVIS:     1. There is distention of the stomach and it least several small bowel loops. The possibility of at least partial small bowel obstruction cannot be excluded  by this limited examination. 2. Staghorn calculus of the left kidney without hydronephrosis.       VS:  T = 98.0 F  BP= 171/69  HR= 66  RR= 20   O2sat= 97% on 2 liters O2 NC    PE:  GEN- elderly female in no acute respiratory distress  PSYCH-A&Ox3  NEURO-GCS 15. Moves all extremities x 4. No slurred speech. No facial droop. Sensation grossly intact. HEENT-NCAT/pupils 2 mm reactive bilateral. Oropharynx clear. NECK-supple, trachea midline  LUNGS- decreased breath sounds on the left; rales on R  HEART-RRR  ABD-soft, NT, distended, hypoactive BS. No R/G/R  VASCULAR-2+ radial/1+DP pulses bilateral. No C/C/E  SKIN-warm/dry  MS-no calf tenderness    A/P:  1)  L > R pleural effusions. Patient will need thoracentesis of the pleural effusion. Pulmonologist was notified of CT findings per nurse report. Patient reports that her breathing has not worsened and she clinically is not in any respiratory distress at current. She was resting comfortably on my arrival at the bedside. I will speak with pulmonologist.  However as patient is not having acute respiratory distress with O2sats within normal limits, emergent thoracentesis may need be indicated.   I will place order to consult with interventional radiologist for drainage of her pleural effusion and to have pulmonologist to follow-up with patient in the a.m. I spoke with one of her son's who was at the bedside and informed him of the same.

## 2018-05-05 NOTE — PROGRESS NOTES
I spoke with pulmonologist on call Dr. Vivienne Mora regarding patient's clinically findings and he recommends having IR consult in am for thoracentesis.

## 2018-05-05 NOTE — PROGRESS NOTES
At 2000 Dr. Kavitha Clarke informed of CT chest results. He asked me to call Pulmonary. I did this. Dr. Luz Marina Baca, asked me to have Dr. Kavitha Clarke call him. I did this. Received new orders from Dr. Kavitha Clarke due to small bowel obstruction. Informed son on new orders. Dr. Lorna Haynes did see pt and discuss the case with the son.

## 2018-05-05 NOTE — PROGRESS NOTES
Problem: Falls - Risk of  Goal: *Absence of Falls  Document Yaron Fall Risk and appropriate interventions in the flowsheet.    Outcome: Progressing Towards Goal  Fall Risk Interventions:  Mobility Interventions: Communicate number of staff needed for ambulation/transfer    Mentation Interventions: Adequate sleep, hydration, pain control    Medication Interventions: Patient to call before getting OOB    Elimination Interventions: Call light in reach    History of Falls Interventions: Door open when patient unattended

## 2018-05-05 NOTE — CONSULTS
[facility] Surgery Consultation for: possible SBO    Requesting Physician: Dr Venancio Gabriel    History of Present Illness:      Kenrick Yancey is a 82 y.o. female who has been admitted for SOB, pulmonary edema, UTI, encephalopathy, NSTEMI, and has metastatic breast cancer. She has been having increasing abdominal bloating over the past few days. She says she has not had a BM in about 7-8 days. She has some nausea but has not vomited. She says she has been passing some flatus. She does not have any abdominal pain.       Past Medical History:   Diagnosis Date    Anemia 9/2/2009    Colon cancer ([address] 9/2/2009    surgery/chemo    Colon cancer ([address] 9/2/2009    DM (diabetes mellitus) ([address] 9/2/2009    GERD (gastroesophageal reflux disease)     H/O: CVA 9/2/2009    slight l sided weakness    Hypothyroid 9/2/2009    Ill-defined condition     seasonal allergies    Microalbuminuria 9/2/2009    Osteoporosis 9/2/2009    Other and unspecified hyperlipidemia 1/27/2010    Rheumatoid arthritis involving ankle ([address] 9/28/2016    Rheumatoid arthritis(714.0) 9/2/2009    Unspecified essential hypertension 9/2/2009    Weakness due to cerebrovascular accident        Past Surgical History:   Procedure Laterality Date    HX COLECTOMY      HX HYSTERECTOMY      HX OTHER SURGICAL      colonoscopies numerous since 1993         Current Facility-Administered Medications:     dextrose 5% with KCl 20 mEq/L infusion, , IntraVENous, CONTINUOUS, Manuel Ragland MD, Last Rate: 50 mL/hr at 05/05/18 0935    [START ON 5/6/2018] levoFLOXacin (LEVAQUIN) 750 mg in D5W IVPB, 750 mg, IntraVENous, Q48H, Manuel Ragland MD    furosemide (LASIX) injection 40 mg, 40 mg, IntraVENous, DAILY, Manuel Ragland MD, 40 mg at 05/05/18 0934    insulin glargine (LANTUS) injection 15 Units, 15 Units, SubCUTAneous, DAILY, Manuel Ragland MD    bisacodyl (DULCOLAX) tablet 5 mg, 5 mg, Oral, DAILY, Manuel Ragland MD    lactulose [facility]) solution 10 g, 15 mL, Oral, BID, Bren Mendez MD, Stopped at 05/05/18 0900    phosphorus (K PHOS NEUTRAL) 250 mg tablet 1 Tab, 1 Tab, Oral, BID, Bren Mendez MD, Stopped at 05/05/18 0900    [START ON 5/8/2018] levothyroxine (SYNTHROID) injection 44 mcg, 44 mcg, IntraVENous, Q24H, Lamar Menendez MD    lisinopril (PRINIVIL, ZESTRIL) tablet 5 mg, 5 mg, Oral, DAILY, Derotha Severe, MD, 5 mg at 05/05/18 0935    albuterol-ipratropium (DUO-NEB) 2.5 MG-0.5 MG/3 ML, 3 mL, Nebulization, Q6H RT, Derotha Severe, MD, 3 mL at 05/05/18 0857    acetylcysteine (MUCOMYST) 200 mg/mL (20 %) solution 200 mg, 200 mg, Nebulization, TID RT, Derotha Severe, MD, 200 mg at 05/05/18 0857    lactobac ac& pc-s.therm-b.anim (BERYL Q/RISAQUAD), 1 Cap, Oral, DAILY, EMEKA Jean MD, Stopped at 05/05/18 0900    ascorbic acid (vitamin C) (VITAMIN C) tablet 1,000 mg, 1,000 mg, Oral, DAILY, Derotha Severe, MD, Stopped at 05/05/18 0900    polyethylene glycol (MIRALAX) packet 17 g, 17 g, Oral, DAILY, Elyse Greenfield MD, Stopped at 05/05/18 0900    naloxone (NARCAN) injection 0.4 mg, 0.4 mg, IntraVENous, Multiple, Bradley Hardy MD    flumazenil (ROMAZICON) 0.1 mg/mL injection 0.2 mg, 0.2 mg, IntraVENous, Multiple, Bradley Hardy MD    midazolam (VERSED) injection 0.25-5 mg, 0.25-5 mg, IntraVENous, Multiple, Bradely Hardy MD    carvedilol (COREG) tablet 6.25 mg, 6.25 mg, Oral, BID WITH MEALS, Vera Breaker. MARIETTA [place], 6.25 mg at 05/05/18 0935    meropenem (MERREM) 500 mg in 0.9% sodium chloride (MBP/ADV) 50 mL, 0.5 g, IntraVENous, Q8H, Lexus Greenfield MD, Last Rate: 100 mL/hr at 05/05/18 0248, 500 mg at 05/05/18 0248    aspirin chewable tablet 81 mg, 81 mg, Oral, DAILY, Ruth Haynes.  MARIETTA [place], Stopped at 05/05/18 0900    spironolactone (ALDACTONE) tablet 25 mg, 25 mg, Oral, DAILY, Baljit Stanley MD, 25 mg at 05/05/18 0935    acetaminophen (TYLENOL) suppository 650 mg, 650 mg, Rectal, Q4H PRN, Rush Daniel MD, 650 mg at 05/01/18 1243   sodium chloride (NS) flush 5-10 mL, 5-10 mL, IntraVENous, Q8H, Fidel Umaña MD, 10 mL at 18 1458    sodium chloride (NS) flush 5-10 mL, 5-10 mL, IntraVENous, PRN, Fidel Umaña MD    budesonide (PULMICORT) 500 mcg/2 ml nebulizer suspension, 500 mcg, Nebulization, BID RT, Fidel Umaña MD, 500 mcg at 18 0857    hydrALAZINE (APRESOLINE) 20 mg/mL injection 10 mg, 10 mg, IntraVENous, Q6H PRN, Fidel Umaña MD, 10 mg at 18 2254    0.9% sodium chloride infusion 250 mL, 250 mL, IntraVENous, PRN, Elizbeth Apgar, MD    sodium chloride (NS) flush 20 mL, 20 mL, InterCATHeter, PRN, Fidel Umaña MD    sodium chloride (NS) flush 10 mL, 10 mL, InterCATHeter, Q24H, Fidel Umaña MD, 10 mL at 18 1821    sodium chloride (NS) flush 10 mL, 10 mL, InterCATHeter, PRN, Fidel Umaña MD    sodium chloride (NS) flush 10 mL, 10 mL, InterCATHeter, Q8H, Fidel Umaña MD, 10 mL at 18 2254    alteplase (CATHFLO) 1 mg in sterile water (preservative free) 1 mL injection, 1 mg, InterCATHeter, PRN, Fidel Umaña MD    bacitracin 500 unit/gram packet 1 Packet, 1 Packet, Topical, PRN, Fidel Umaña MD    sodium chloride (NS) flush 10-30 mL, 10-30 mL, InterCATHeter, PRN, Dai Keenan MD    sodium chloride (NS) flush 10 mL, 10 mL, InterCATHeter, Q24H, Dai Keenan MD, 10 mL at 18 1015    sodium chloride (NS) flush 10 mL, 10 mL, InterCATHeter, PRN, Dai Keenan MD    sodium chloride (NS) flush 10-40 mL, 10-40 mL, InterCATHeter, Q8H, Dai Keenan MD, 10 mL at 18 1458    sodium chloride (NS) flush 20 mL, 20 mL, InterCATHeter, Q24H, Dai Keenan MD, 20 mL at 18 1134    anastrozole (ARIMIDEX) tablet 1 mg, 1 mg, Oral, DAILY, Elizbeth Apgar, MD, 1 mg at 18 0934    epoetin celio (EPOGEN;PROCRIT) injection 10,000 Units, 10,000 Units, SubCUTAneous, Q MON, WED & FRI, Debbie Chacko MD, 10,000 Units at 18 1403   albuterol-ipratropium (DUO-NEB) 2.5 MG-0.5 MG/3 ML, 3 mL, Nebulization, Q6H PRN, Melany Kong MD, 3 mL at 04/27/18 1407    glucose chewable tablet 16 g, 4 Tab, Oral, PRN, Carlene Gosselin, MD    dextrose (D50W) injection syrg 12.5-25 g, 12.5-25 g, IntraVENous, PRN, Carlene Gosselin, MD    glucagon (GLUCAGEN) injection 1 mg, 1 mg, IntraMUSCular, PRN, Carlene Gosselin, MD    insulin lispro (HUMALOG) injection, , SubCUTAneous, AC&HS, Carlene Gosselin, MD, 2 Units at 05/05/18 0722    prochlorperazine (COMPAZINE) with saline injection 5 mg, 5 mg, IntraVENous, Q8H PRN, Annamarie Torrez MD, 5 mg at 04/22/18 0837    cholecalciferol (VITAMIN D3) tablet 1,000 Units, 1,000 Units, Oral, DAILY, Melany Kong MD, Stopped at 02/29/73 9401    folic acid (FOLVITE) tablet 1 mg, 1 mg, Oral, DAILY, Melany Kong MD, Stopped at 05/05/18 0900    magnesium oxide (MAG-OX) tablet 400 mg, 400 mg, Oral, DAILY, Melany Kong MD, Stopped at 05/05/18 0900    therapeutic multivitamin (THERAGRAN) tablet 1 Tab, 1 Tab, Oral, DAILY, Melany Kong MD, Stopped at 05/05/18 0900    fish oil-omega-3 fatty acids 340-1,000 mg capsule 1 Cap, 1 Cap, Oral, DAILY, Melany Kong MD, Stopped at 05/05/18 0900    sodium chloride (NS) flush 5-10 mL, 5-10 mL, IntraVENous, Q8H, Melany Kong MD, 10 mL at 05/04/18 1458    sodium chloride (NS) flush 5-10 mL, 5-10 mL, IntraVENous, PRN, Melany Kong MD, 10 mL at 04/22/18 0300    ondansetron (ZOFRAN) injection 4 mg, 4 mg, IntraVENous, Q4H PRN, Melany Kong MD, 4 mg at 04/18/18 1846    Allergies   Allergen Reactions    Statins-Hmg-Coa Reductase Inhibitors Other (comments)     Intolerant to statins     Sulfa (Sulfonamide Antibiotics) Other (comments)     syncope       Social History     Social History    Marital status:      Spouse name: N/A    Number of children: N/A    Years of education: N/A     Occupational History    Not on file.      Social History Main Topics    Smoking status: Never Smoker    Smokeless tobacco: Never Used    Alcohol use No    Drug use: No    Sexual activity: Not Currently     Partners: Male     Other Topics Concern    Not on file     Social History Narrative       Family History   Problem Relation Age of Onset    Diabetes Mother     Stroke Mother     Diabetes Sister     Other Sister      fell and hit her head -  of this   [facility] Diabetes Brother     Cancer Father      stomach    Diabetes Sister        ROS   Constitutional: negative  Ears, Nose, Mouth, Throat, and Face: negative  Respiratory: negative  Cardiovascular: negative  Gastrointestinal: bloating  Genitourinary:negative  Integument/Breast: negative  Hematologic/Lymphatic: negative  Behavioral/Psychiatric: negative  Allergic/Immunologic: negative         Physical Exam:     Visit Vitals    /60 (BP 1 Location: Left arm, BP Patient Position: At rest)    Pulse 87    Temp 98 °F (36.7 °C)    Resp 18    Ht 5' 1\" (1.549 m)    Wt 138 lb 14.4 oz (63 kg)    SpO2 100%    BMI 26.24 kg/m2       General - alert and oriented, no apparent distress, well developed  HEENT - no jaundice, no hearing imparement  Pulm - CTAB, no C/W/R  CV - RRR, no M/R/G  Abd - soft, distended, BS present, NTTP, no hernia, no peritonitis  Ext - pulses intact in UE and LE bilaterally, no edema  Skin - supple and no rashes  Psychiatric - normal affect, good mood    Labs  Recent Results (from the past 12 hour(s))   GLUCOSE, POC    Collection Time: 18 10:51 PM   Result Value Ref Range    Glucose (POC) 188 (H) 65 - 100 mg/dL    Performed by [facility]    METABOLIC PANEL, COMPREHENSIVE    Collection Time: 18  3:34 AM   Result Value Ref Range    Sodium 137 136 - 145 mmol/L    Potassium 3.9 3.5 - 5.1 mmol/L    Chloride 100 97 - 108 mmol/L    CO2 29 21 - 32 mmol/L    Anion gap 8 5 - 15 mmol/L    Glucose 201 (H) 65 - 100 mg/dL    BUN 23 (H) 6 - 20 MG/DL    Creatinine 0.87 0.55 - 1.02 MG/DL BUN/Creatinine ratio 26 (H) 12 - 20      GFR est AA >60 >60 ml/min/1.73m2    GFR est non-AA >60 >60 ml/min/1.73m2    Calcium 8.9 8.5 - 10.1 MG/DL    Bilirubin, total 0.5 0.2 - 1.0 MG/DL    ALT (SGPT) 28 12 - 78 U/L    AST (SGOT) 43 (H) 15 - 37 U/L    Alk. phosphatase 417 (H) 45 - 117 U/L    Protein, total 6.5 6.4 - 8.2 g/dL    Albumin 1.9 (L) 3.5 - 5.0 g/dL    Globulin 4.6 (H) 2.0 - 4.0 g/dL    A-G Ratio 0.4 (L) 1.1 - 2.2     CBC WITH AUTOMATED DIFF    Collection Time: 05/05/18  3:34 AM   Result Value Ref Range    WBC 11.6 (H) 3.6 - 11.0 K/uL    RBC 3.35 (L) 3.80 - 5.20 M/uL    HGB 9.8 (L) 11.5 - 16.0 g/dL    HCT 31.0 (L) 35.0 - 47.0 %    MCV 92.5 80.0 - 99.0 FL    MCH 29.3 26.0 - 34.0 PG    MCHC 31.6 30.0 - 36.5 g/dL    RDW 22.1 (H) 11.5 - 14.5 %    PLATELET 264 343 - 466 K/uL    MPV 10.3 8.9 - 12.9 FL    NRBC 0.3 (H) 0  WBC    ABSOLUTE NRBC 0.03 (H) 0.00 - 0.01 K/uL    NEUTROPHILS 74 32 - 75 %    LYMPHOCYTES 12 12 - 49 %    MONOCYTES 10 5 - 13 %    EOSINOPHILS 2 0 - 7 %    BASOPHILS 1 0 - 1 %    IMMATURE GRANULOCYTES 1 (H) 0.0 - 0.5 %    ABS. NEUTROPHILS 8.6 (H) 1.8 - 8.0 K/UL    ABS. LYMPHOCYTES 1.4 0.8 - 3.5 K/UL    ABS. MONOCYTES 1.2 (H) 0.0 - 1.0 K/UL    ABS. EOSINOPHILS 0.2 0.0 - 0.4 K/UL    ABS. BASOPHILS 0.1 0.0 - 0.1 K/UL    ABS. IMM.  GRANS. 0.1 (H) 0.00 - 0.04 K/UL    DF SMEAR SCANNED      RBC COMMENTS ANISOCYTOSIS  2+        RBC COMMENTS OVALOCYTES  PRESENT        RBC COMMENTS POLYCHROMASIA  1+        RBC COMMENTS TARGET CELLS  PRESENT        WBC COMMENTS HYPERSEGMENTED POLYS     PHOSPHORUS    Collection Time: 05/05/18  3:34 AM   Result Value Ref Range    Phosphorus 3.3 2.6 - 4.7 MG/DL   POC G3 - PUL    Collection Time: 05/05/18  3:49 AM   Result Value Ref Range    FIO2 (POC) 28 %    pH (POC) 7.501 (H) 7.35 - 7.45      pCO2 (POC) 41.5 35.0 - 45.0 MMHG    pO2 (POC) 75 (L) 80 - 100 MMHG    HCO3 (POC) 32.4 (H) 22 - 26 MMOL/L    sO2 (POC) 96 92 - 97 %    Base excess (POC) 9 mmol/L    Site RIGHT RADIAL Device: NASAL CANNULA      Flow rate (POC) 4 L/M    Allens test (POC) YES      Specimen type (POC) ARTERIAL      Total resp. rate 18     GLUCOSE, POC    Collection Time: 05/05/18  6:48 AM   Result Value Ref Range    Glucose (POC) 196 (H) 65 - 100 mg/dL    Performed by Dhaval Dobbs          Imaging  CT - THORAX:     1. Large left pleural effusion and complete atelectasis of the left lung  resulting in opacification of the left hemithorax. 2. Moderate-sized right pleural effusion and complete atelectasis of the right  lower lobe. 3. Small pericardial effusion. 4. Widespread metastatic disease of the skeletal structures as described above.     ABDOMEN/PELVIS:     1. There is distention of the stomach and it least several small bowel loops. The possibility of at least partial small bowel obstruction cannot be excluded  by this limited examination. 2. Staghorn calculus of the left kidney without hydronephrosis. I have personally reviewed all of the pertinent images     Assessment:     Arthur Hdez is a 82 y.o. female with SBO vs ileus    Recommendations:     1. She does appear to have SBO vs ileus. The CT was incomplete and did not see the lower portion of the abd. She has had some surgery in the abdomen with colecotmy and hysterectomy and may have some adhesions. Her abdominal exam is benign, soft and has normal WBC. Her stomach on the CT is very large and will need NGT for decompression. This should help her feel a little better. I will get a KUB tomorrow. Keep her NPO for now and will see if it resolves. I will be following.     James Palacios MD

## 2018-05-05 NOTE — PROGRESS NOTES
Hospitalist Progress Note  Laurence Galvan MD  Answering service: [phone] OR [phone] from in house phone        Date of Service:  2018  NAME:  Sumit Diaz  :  1935  MRN:  [id number]      Admission Summary:   The patient is an 82-year-old female with history of diabetes and rheumatoid arthritis who initially presented to the emergency room via EMS with complaints of shortness of breath    Interval history / Subjective:   Patient lying comfortably in bed has no new complaints denies any shortness of breath  Did not need BIPAP last night. Discussed case with Son updated about Ct finding. Assessment & Plan:     Acute on chronic hypoxic/hypercarbic respiratory failure due to pulmonary edema, pneumonia  -s/p bronchoscopy on  and on  suctioned and cleared  -respiratory culture grow on  and  grow candida albicans, on diflucan which is stopped today  -,resp distress and CXR worsening worsening of near complete left hemithorax opacification.  -On and off BIPAP  -S/p bronch and clearing of secretion on   -CXR shows Increased bilateral pleural effusions and lung opacities resulting in near  complete opacification of the left hemithorax. Ct showed  1. Large left pleural effusion and complete atelectasis of the left lung  resulting in opacification of the left hemithorax. 2. Moderate-sized right pleural effusion and complete atelectasis of the right  lower lobe. 3. Small pericardial effusion. 4. Widespread metastatic disease of the skeletal structures as described above.     ABDOMEN/PELVIS:     1. There is distention of the stomach and it least several small bowel loops. The possibility of at least partial small bowel obstruction cannot be excluded  by this limited examination. 2. Staghorn calculus of the left kidney without hydronephrosis    IR consulted for drainage. Pulmonology on consult.       Acute encephalopathy possible due to metabolic. Resolved  -CT head on 4/24 volume loss and minimal white matter disease. No acute intracranial [place], Sinus mucosal inflammatory change  -MRI of brain 4/26 Study limited by motion artifact, volume loss and white matter disease. No definite intracranial metastasis however no intravenous contrast was administered due to poor renal function. 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 Sepsis secondary to E Coli UTI, pneumonia  POA  -urine cx  4/19 grow e coli, pseudomanas sp  -blood cx on 4/18, 4/19 and 4/22 no growth  Antinfectives  -Ceftriaxone 1 dose on 4/19  -Diflucan 4/26-present  -Levaquin 4/24-present  -Meropenem 4/24-present  -Zosyn 4/19-4/24  -Vancomycin 4/19-4/28    Stop Diflucan    NSTEMI  - on aspirin,coreg. Statin intolerant,she is on fish oil. She was getting Evolocumab injections out patient.  -cardiologist on board,possible cardiac cath next week    Acute systolic CHF NYHA Class IV  -Intermittent diuresis,on hydralazine, spironolactone,coreg   -BP is high and renal function has improved and stabilized. Start low dose lisinopril    JEROD on CKD stage III,creatinien improved  -creatinine improved and stabilized. Increase Lasix to 40 mg daily  -nephrologist on board    Metastatic left breast cancer with diffuse bone mets.+er,+Pr  -on armidex   -Hem/Onc on board    HTN  -BP stablecon hydralazine, coreg and spironolactone,  monitor BP. Added low dose lisinopril 5/3    DM-II  -Blood glucose 170-130s. She off the oral medications(Glimepiride,sitagliptin and metformin) she was taking PTA  -Add low dose Lantus 8 units sc daily,continue insulin sliding scale, monitor finger stick glucose    Hypothyroidism  -continue synthroid    Hx of GERD  -continue pepcid    Elevated liver enzyme   -possible related to liver lesion, monitor LFT    Anemia multifactorial  -Hgb 6.7, s/p 2 units pRBC on 4/26,   -Hgb 9.3, monitor H/H    Hx of colon cancer 25 years ago  -according to son, there is a work-up planned with [facility] regarding the possibility of recurrent CA (based on lesions seen in the calvarium on MRI - 3/19). -hem/onc on board    H/o stroke with residual weakness on the left    Diet,suspect silent aspiration. NPO as Ct showed SBO. Surgery consulted. --    Hypomagnesemia: iv mg today,on oral magnesium. H/O Seropositive RA of multiple sites,stable. -Apparently she was getting Etanercept sc injections out patient. Debility: PT/OT requested. She likely needs rehab on discharge        Code status: DNR  DVT prophylaxis:SCD    Care Plan discussed with: Patient/Family and Nurse  Disposition:in ICU    5/1,updated son at bedside and questions answered  Contact son, Eric Blanton . Hospital Problems  Date Reviewed: 4/16/2018          Codes Class Noted POA    Acute systolic heart failure ([facility] [address] ICD-10-CM: I50.21  ICD-9-CM: 428.21  4/24/2018 Unknown        Altered mental status, unspecified ICD-10-CM: R41.82  ICD-9-CM: 780.97  4/23/2018 Unknown        * (Principal)SOB (shortness of breath) ICD-10-CM: R06.02  ICD-9-CM: 786.05  4/16/2018 Unknown                Vital Signs:    Last 24hrs VS reviewed since prior progress note. Most recent are:  Visit Vitals    /74 (BP 1 Location: Left arm)    Pulse 92    Temp 98.1 °F (36.7 °C)    Resp 18    Ht 5' 1\" (1.549 m)    Wt 63 kg (138 lb 14.4 oz)    SpO2 98%    BMI 26.24 kg/m2         Intake/Output Summary (Last 24 hours) at 05/05/18 0722  Last data filed at 05/04/18 2255   Gross per 24 hour   Intake              380 ml   Output             1900 ml   Net            -1520 ml        Physical Examination:             Constitutional:  Alert and conversant,on nasal canula   ENT:  Oral mucous moist, oropharynx benign. Neck supple,    Resp:  Decrease bronchial breath sound, few wheezing and rhonic bilaterally. No accessory muscle use   CV:  Regular rhythm, normal rate, no murmurs, gallops, rubs    GI:  Soft, non distended, non tender. normoactive bowel sounds, no hepatosplenomegaly     Musculoskeletal:  No edema    Neurologic:  Conscious and alert, oriented to place and person, answer questions, moves all extremities. Left side weak     Skin:  Good turgor, no rashes or ulcers       Data Review:    Review and/or order of clinical lab test      Labs:     Recent Labs      05/05/18 0334 05/04/18   0320   WBC  11.6*  13.2*   HGB  9.8*  9.5*   HCT  31.0*  29.9*   PLT  223  197     Recent Labs      05/05/18 0334 05/04/18 0320  05/03/18   0446   NA  137  138  137   K  3.9  3.9  4.2   CL  100  102  100   CO2  29  31  29   BUN  23*  22*  24*   CREA  0.87  0.83  0.78   GLU  201*  180*  209*   CA  8.9  9.0  9.0   MG   --   2.0  1.5*   PHOS  3.3  2.5*  2.3*     Recent Labs      05/05/18 0334 05/04/18 0320 05/03/18   0446   SGOT  43*  46*  36   ALT  28  28  19   AP  417*  336*  325*   TBILI  0.5  0.5  0.5   TP  6.5  6.2*  6.1*   ALB  1.9*  1.8*  1.7*   GLOB  4.6*  4.4*  4.4*     No results for input(s): INR, PTP, APTT in the last 72 hours. No lab exists for component: INREXT, INREXT   No results for input(s): FE, TIBC, PSAT, FERR in the last 72 hours. No results found for: FOL, RBCF   No results for input(s): PH, PCO2, PO2 in the last 72 hours. No results for input(s): CPK, CKNDX, TROIQ in the last 72 hours.     No lab exists for component: CPKMB  Lab Results   Component Value Date/Time    Cholesterol, total 74 04/16/2018 04:21 AM    HDL Cholesterol 25 04/16/2018 04:21 AM    LDL, calculated 31.6 04/16/2018 04:21 AM    Triglyceride 87 04/16/2018 04:21 AM    CHOL/HDL Ratio 3.0 04/16/2018 04:21 AM     Lab Results   Component Value Date/Time    Glucose (POC) 196 (H) 05/05/2018 06:48 AM    Glucose (POC) 188 (H) 05/04/2018 10:51 PM    Glucose (POC) 262 (H) 05/04/2018 04:35 PM    Glucose (POC) 196 (H) 05/04/2018 12:05 PM    Glucose (POC) 156 (H) 05/04/2018 06:37 AM     Lab Results   Component Value Date/Time    Color YELLOW/STRAW 04/20/2018 04:30 AM Appearance TURBID (A) 04/20/2018 04:30 AM    Specific gravity 1.029 04/20/2018 04:30 AM    pH (UA) 5.0 04/20/2018 04:30 AM    Protein 100 (A) 04/20/2018 04:30 AM    Glucose NEGATIVE  04/20/2018 04:30 AM    Ketone NEGATIVE  04/20/2018 04:30 AM    Bilirubin NEGATIVE  04/20/2018 04:30 AM    Urobilinogen 0.2 04/20/2018 04:30 AM    Nitrites NEGATIVE  04/20/2018 04:30 AM    Leukocyte Esterase LARGE (A) 04/20/2018 04:30 AM    Epithelial cells FEW 04/20/2018 04:30 AM    Bacteria 3+ (A) 04/20/2018 04:30 AM    WBC >100 (H) 04/20/2018 04:30 AM    RBC 5-10 04/20/2018 04:30 AM         Medications Reviewed:     Current Facility-Administered Medications   Medication Dose Route Frequency    insulin glargine (LANTUS) injection 10 Units  10 Units SubCUTAneous DAILY    levoFLOXacin (LEVAQUIN) tablet 750 mg  750 mg Oral Q48H    bisacodyl (DULCOLAX) tablet 5 mg  5 mg Oral DAILY    lactulose (CHRONULAC) solution 10 g  15 mL Oral BID    furosemide (LASIX) tablet 40 mg  40 mg Oral DAILY    phosphorus (K PHOS NEUTRAL) 250 mg tablet 1 Tab  1 Tab Oral BID    [START ON 5/8/2018] levothyroxine (SYNTHROID) injection 44 mcg  44 mcg IntraVENous Q24H    lisinopril (PRINIVIL, ZESTRIL) tablet 5 mg  5 mg Oral DAILY    albuterol-ipratropium (DUO-NEB) 2.5 MG-0.5 MG/3 ML  3 mL Nebulization Q6H RT    acetylcysteine (MUCOMYST) 200 mg/mL (20 %) solution 200 mg  200 mg Nebulization TID RT    lactobac ac& pc-s.therm-b.anim (BERYL Q/RISAQUAD)  1 Cap Oral DAILY    ascorbic acid (vitamin C) (VITAMIN C) tablet 1,000 mg  1,000 mg Oral DAILY    polyethylene glycol (MIRALAX) packet 17 g  17 g Oral DAILY    naloxone (NARCAN) injection 0.4 mg  0.4 mg IntraVENous Multiple    flumazenil (ROMAZICON) 0.1 mg/mL injection 0.2 mg  0.2 mg IntraVENous Multiple    midazolam (VERSED) injection 0.25-5 mg  0.25-5 mg IntraVENous Multiple    carvedilol (COREG) tablet 6.25 mg  6.25 mg Oral BID WITH MEALS    meropenem (MERREM) 500 mg in 0.9% sodium chloride (MBP/ADV) 50 mL  0.5 g IntraVENous Q8H    aspirin chewable tablet 81 mg  81 mg Oral DAILY    spironolactone (ALDACTONE) tablet 25 mg  25 mg Oral DAILY    acetaminophen (TYLENOL) suppository 650 mg  650 mg Rectal Q4H PRN    sodium chloride (NS) flush 5-10 mL  5-10 mL IntraVENous Q8H    sodium chloride (NS) flush 5-10 mL  5-10 mL IntraVENous PRN    budesonide (PULMICORT) 500 mcg/2 ml nebulizer suspension  500 mcg Nebulization BID RT    hydrALAZINE (APRESOLINE) 20 mg/mL injection 10 mg  10 mg IntraVENous Q6H PRN    0.9% sodium chloride infusion 250 mL  250 mL IntraVENous PRN    sodium chloride (NS) flush 20 mL  20 mL InterCATHeter PRN    sodium chloride (NS) flush 10 mL  10 mL InterCATHeter Q24H    sodium chloride (NS) flush 10 mL  10 mL InterCATHeter PRN    sodium chloride (NS) flush 10 mL  10 mL InterCATHeter Q8H    alteplase (CATHFLO) 1 mg in sterile water (preservative free) 1 mL injection  1 mg InterCATHeter PRN    bacitracin 500 unit/gram packet 1 Packet  1 Packet Topical PRN    sodium chloride (NS) flush 10-30 mL  10-30 mL InterCATHeter PRN    sodium chloride (NS) flush 10 mL  10 mL InterCATHeter Q24H    sodium chloride (NS) flush 10 mL  10 mL InterCATHeter PRN    sodium chloride (NS) flush 10-40 mL  10-40 mL InterCATHeter Q8H    sodium chloride (NS) flush 20 mL  20 mL InterCATHeter Q24H    anastrozole (ARIMIDEX) tablet 1 mg  1 mg Oral DAILY    epoetin celio (EPOGEN;PROCRIT) injection 10,000 Units  10,000 Units SubCUTAneous Q MON, WED & FRI    albuterol-ipratropium (DUO-NEB) 2.5 MG-0.5 MG/3 ML  3 mL Nebulization Q6H PRN    glucose chewable tablet 16 g  4 Tab Oral PRN    dextrose (D50W) injection syrg 12.5-25 g  12.5-25 g IntraVENous PRN    glucagon (GLUCAGEN) injection 1 mg  1 mg IntraMUSCular PRN    insulin lispro (HUMALOG) injection   SubCUTAneous AC&HS    prochlorperazine (COMPAZINE) with saline injection 5 mg  5 mg IntraVENous Q8H PRN    cholecalciferol (VITAMIN D3) tablet 1,000 Units  1,000 Units Oral DAILY    folic acid (FOLVITE) tablet 1 mg  1 mg Oral DAILY    magnesium oxide (MAG-OX) tablet 400 mg  400 mg Oral DAILY    therapeutic multivitamin (THERAGRAN) tablet 1 Tab  1 Tab Oral DAILY    fish oil-omega-3 fatty acids 340-1,000 mg capsule 1 Cap  1 Cap Oral DAILY    sodium chloride (NS) flush 5-10 mL  5-10 mL IntraVENous Q8H    sodium chloride (NS) flush 5-10 mL  5-10 mL IntraVENous PRN    ondansetron (ZOFRAN) injection 4 mg  4 mg IntraVENous Q4H PRN     ______________________________________________________________________  EXPECTED LENGTH OF STAY: 4d 21h  ACTUAL LENGTH OF STAY:          [address]

## 2018-05-05 NOTE — PROGRESS NOTES
ID Progress Note  2018    Subjective:     She remains weak. Objective:     Antibiotics:  1. Merrem  2. Levaquin  3. Fluconazole      Vitals:   Visit Vitals    /60 (BP 1 Location: Left arm, BP Patient Position: At rest)    Pulse 87    Temp 98 °F (36.7 °C)    Resp 18    Ht 5' 1\" (1.549 m)    Wt 63 kg (138 lb 14.4 oz)    SpO2 100%    BMI 26.24 kg/m2        Tmax:  Temp (24hrs), Av.9 °F (36.6 °C), Min:97.6 °F (36.4 °C), Max:98.1 °F (36.7 °C)      Exam:  Lungs:  rhonchi R anterior  Heart:  regular rate and rhythm  Abdomen:  soft, non-tender. Bowel sounds normal. No masses,  no organomegaly  Skin:  no rash or abnormalities    Labs:      Recent Labs      18   0334  18   0320  18   0446   WBC  11.6*  13.2*  16.7*   HGB  9.8*  9.5*  9.6*   PLT  223  197  176   BUN  23*  22*  24*   CREA  0.87  0.83  0.78   SGOT  43*  46*  36   AP  417*  336*  325*   TBILI  0.5  0.5  0.5       Cultures:     No results found for: SD  Lab Results   Component Value Date/Time    Culture result: LIGHT YEAST (A) 2018 11:30 AM    Culture result: LIGHT NORMAL RESPIRATORY BERYL 2018 11:30 AM    Culture result: LIGHT APPARENT CANDIDA ALBICANS (A) 2018 09:10 AM    Culture result: NO NORMAL RESPIRATORY BERYL ISOLATED 2018 09:10 AM       Radiology: X ray with increased opacification on the left    Line/Insert Date:           Assessment:     1. Pneumonia  1. Thoracentesis today  2. Metastatic cancer  3. Debility  4. Candida from sputum (uncertain significance)  5. Leukocytosis--WBC up and down--again improved today    Objective:     1. Continue current therapy  2. Follow up pending cultures and studies  3. Aggressive pulmonary toilet  4.  D/W tia Hussein MD

## 2018-05-05 NOTE — PROGRESS NOTES
Pulmonary, Critical Care, and Sleep Medicine~Progress Note    Name: Darrick England MRN: [id number]   : 1935 Hospital: [address]   Date: 2018 11:37 AM Admission: 2018      Impression Plan   1. Metastatic breast cancer- new dx- left breast mass on MRI with diffuse skeletal mets. Follows with Dr. Bryanna Borges  2. Acute hypercarbic/hypoxic resp failure-pCO2 ok range  3. Bilateral pleural effusions L>R  4. S/p L thoracentesis   5. H/o mucous pluggingrequiring bronch  6. Abd distention- ? PSBO--> Ng   7. Altered mental status  8. NSTEMI - ECHO EF 40%; not small pericardial effusion- ? malignnat  9. JEROD on CKD  10. GERD  11. E Coli UTI  12. Malnutrition  13. prior colon ca/ sgy--> ? adhesions 1. Monitor CXR post tap-need f/u for reoccurence  2. May consider pleuRx drain  3. Await PF studies- confirm malignant  4. O2 titration above 90%,    5. Pulmonary toliet- nebs /IS  6. Abx per ID: merrem/Flagyl  7. NG per surgery- serial eval  8. Consider SIDNEY support      I d/w pt's dtr at bedside. Pt more comfortable post tap/NG  Poor prognosis     Daily Progression:      Chart reviewed-  New issues abd distention; L chest opacified-s/p thora  Feels better post thoracentesis  No chest pain  Some cough-- mostly NP  NG feels bad in nose/throat- choking sensation      5/3  Feeling better. No acute dyspnea. Alert and answering questions appropriately.  chest film looks better     :   feels better today after bronchoscopy, but still quite coarse. WBC down, creatinine better, cultures from yesterday are pending. MBS was normal.  Noted poor cough reflex. In bed without distress. On NC.      :  Looked comfortable this morning. Noted events overnight. Chest film with left opacification, received lasix last night     I have reviewed the labs and previous days notes.     ROS      OBJECTIVE:     Vital Signs:       Visit Vitals    /60 (BP 1 Location: Left arm, BP Patient Position: At rest)    Pulse 87    Temp 98 °F (36.7 °C)    Resp 18    Ht 5' 1\" (1.549 m)    Wt 63 kg (138 lb 14.4 oz)    SpO2 100%    BMI 26.24 kg/m2      Temp (24hrs), Av.9 °F (36.6 °C), Min:97.6 °F (36.4 °C), Max:98.1 °F (36.7 °C)     Intake/Output:     Last shift:      Last 3 shifts:  190 -  0700  In: 380 [P.O.:380]  Out: 2600 [Urine:2600]          Intake/Output Summary (Last 24 hours) at 18 1212  Last data filed at 18 0600   Gross per 24 hour   Intake              200 ml   Output             2400 ml   Net            -2200 ml       Physical Exam:                                        Exam Findings Other   General: Chronically ill No resp distress noted, appears stated age    HEENT:  No ulcers, JVD not elevated, no cervical LAD, NG in place    Chest: Decreased BS bases R.  L/ lose rhonci    HEART:  RRR, no murmurs/rubs/gallops    Lungs:  Coarse decreased bases R>L    ABD: Soft/NT, non rigid, non-distended  afterNG   EXT: No cyanosis/clubbing/edema, normal peripheral pulses    Skin: No rashes or ulcers, no mottling    Neuro: Alert and answers simple questions         Medications:  Current Facility-Administered Medications   Medication Dose Route Frequency    dextrose 5% with KCl 20 mEq/L infusion   IntraVENous CONTINUOUS    [START ON 2018] levoFLOXacin (LEVAQUIN) 750 mg in D5W IVPB  750 mg IntraVENous Q48H    furosemide (LASIX) injection 40 mg  40 mg IntraVENous DAILY    insulin glargine (LANTUS) injection 15 Units  15 Units SubCUTAneous DAILY    bisacodyl (DULCOLAX) tablet 5 mg  5 mg Oral DAILY    lactulose (CHRONULAC) solution 10 g  15 mL Oral BID    phosphorus (K PHOS NEUTRAL) 250 mg tablet 1 Tab  1 Tab Oral BID    [START ON 2018] levothyroxine (SYNTHROID) injection 44 mcg  44 mcg IntraVENous Q24H    lisinopril (PRINIVIL, ZESTRIL) tablet 5 mg  5 mg Oral DAILY    albuterol-ipratropium (DUO-NEB) 2.5 MG-0.5 MG/3 ML  3 mL Nebulization Q6H RT    acetylcysteine (MUCOMYST) 200 mg/mL (20 %) solution 200 mg  200 mg Nebulization TID RT    lactobac ac& pc-s.therm-b.anim (BERYL Q/RISAQUAD)  1 Cap Oral DAILY    ascorbic acid (vitamin C) (VITAMIN C) tablet 1,000 mg  1,000 mg Oral DAILY    polyethylene glycol (MIRALAX) packet 17 g  17 g Oral DAILY    naloxone (NARCAN) injection 0.4 mg  0.4 mg IntraVENous Multiple    flumazenil (ROMAZICON) 0.1 mg/mL injection 0.2 mg  0.2 mg IntraVENous Multiple    midazolam (VERSED) injection 0.25-5 mg  0.25-5 mg IntraVENous Multiple    carvedilol (COREG) tablet 6.25 mg  6.25 mg Oral BID WITH MEALS    meropenem (MERREM) 500 mg in 0.9% sodium chloride (MBP/ADV) 50 mL  0.5 g IntraVENous Q8H    aspirin chewable tablet 81 mg  81 mg Oral DAILY    spironolactone (ALDACTONE) tablet 25 mg  25 mg Oral DAILY    acetaminophen (TYLENOL) suppository 650 mg  650 mg Rectal Q4H PRN    sodium chloride (NS) flush 5-10 mL  5-10 mL IntraVENous Q8H    sodium chloride (NS) flush 5-10 mL  5-10 mL IntraVENous PRN    budesonide (PULMICORT) 500 mcg/2 ml nebulizer suspension  500 mcg Nebulization BID RT    hydrALAZINE (APRESOLINE) 20 mg/mL injection 10 mg  10 mg IntraVENous Q6H PRN    0.9% sodium chloride infusion 250 mL  250 mL IntraVENous PRN    sodium chloride (NS) flush 20 mL  20 mL InterCATHeter PRN    sodium chloride (NS) flush 10 mL  10 mL InterCATHeter Q24H    sodium chloride (NS) flush 10 mL  10 mL InterCATHeter PRN    sodium chloride (NS) flush 10 mL  10 mL InterCATHeter Q8H    alteplase (CATHFLO) 1 mg in sterile water (preservative free) 1 mL injection  1 mg InterCATHeter PRN    bacitracin 500 unit/gram packet 1 Packet  1 Packet Topical PRN    sodium chloride (NS) flush 10-30 mL  10-30 mL InterCATHeter PRN    sodium chloride (NS) flush 10 mL  10 mL InterCATHeter Q24H    sodium chloride (NS) flush 10 mL  10 mL InterCATHeter PRN    sodium chloride (NS) flush 10-40 mL  10-40 mL InterCATHeter Q8H    sodium chloride (NS) flush 20 mL  20 mL InterCATHeter Q24H    anastrozole (ARIMIDEX) tablet 1 mg  1 mg Oral DAILY    epoetin celio (EPOGEN;PROCRIT) injection 10,000 Units  10,000 Units SubCUTAneous Q MON, WED & FRI    albuterol-ipratropium (DUO-NEB) 2.5 MG-0.5 MG/3 ML  3 mL Nebulization Q6H PRN    glucose chewable tablet 16 g  4 Tab Oral PRN    dextrose (D50W) injection syrg 12.5-25 g  12.5-25 g IntraVENous PRN    glucagon (GLUCAGEN) injection 1 mg  1 mg IntraMUSCular PRN    insulin lispro (HUMALOG) injection   SubCUTAneous AC&HS    prochlorperazine (COMPAZINE) with saline injection 5 mg  5 mg IntraVENous Q8H PRN    cholecalciferol (VITAMIN D3) tablet 1,000 Units  1,000 Units Oral DAILY    folic acid (FOLVITE) tablet 1 mg  1 mg Oral DAILY    magnesium oxide (MAG-OX) tablet 400 mg  400 mg Oral DAILY    therapeutic multivitamin (THERAGRAN) tablet 1 Tab  1 Tab Oral DAILY    fish oil-omega-3 fatty acids 340-1,000 mg capsule 1 Cap  1 Cap Oral DAILY    sodium chloride (NS) flush 5-10 mL  5-10 mL IntraVENous Q8H    sodium chloride (NS) flush 5-10 mL  5-10 mL IntraVENous PRN    ondansetron (ZOFRAN) injection 4 mg  4 mg IntraVENous Q4H PRN       Labs:  ABG Recent Labs      05/05/18   0349   PHI  7.501*   PCO2I  41.5   PO2I  75*   HCO3I  32.4*   SO2I  96   FIO2I  28        CBC Recent Labs      05/05/18   0334  05/04/18   0320  05/03/18   0446   WBC  11.6*  13.2*  16.7*   HGB  9.8*  9.5*  9.6*   HCT  31.0*  29.9*  30.4*   PLT  223  197  176   MCV  92.5  91.2  91.3   MCH  29.3  29.0  44.5        Metabolic  Panel Recent Labs      05/05/18   0334  05/04/18   0320  05/03/18   0446   NA  137  138  137   K  3.9  3.9  4.2   CL  100  102  100   CO2  29  31  29   GLU  201*  180*  209*   BUN  23*  22*  24*   CREA  0.87  0.83  0.78   CA  8.9  9.0  9.0   MG   --   2.0  1.5*   PHOS  3.3  2.5*  2.3*   ALB  1.9*  1.8*  1.7*   SGOT  43*  46*  36   ALT  28  28  19        Pertinent Labs                Julissa Segura, MD  5/5/2018

## 2018-05-05 NOTE — PROGRESS NOTES
-Hematology / Oncology ([facility]) -  -Primary Oncologist-  Dr Joyce Miranda  -CC- breast cancer    -S-  No new complaints    -O-      Patient Vitals for the past 24 hrs:   Temp Pulse Resp BP SpO2   05/05/18 1450 98.4 °F (36.9 °C) 82 16 110/55 91 %   05/05/18 0850 98 °F (36.7 °C) 87 18 135/60 100 %   05/05/18 0303 98.1 °F (36.7 °C) 92 18 138/74 98 %   05/04/18 2113 98 °F (36.7 °C) 66 20 171/69 97 %   05/04/18 1830 - 89 22 - -   05/04/18 1629 97.6 °F (36.4 °C) (!) 118 (!) 36 176/90 100 %        Gen: nad  NGtube in place now  Chest: bilateral breath sounds wheezy  Abdo- distended  Cardiac: rrr  Abd: s/nt    -Labs-    Recent Labs      05/05/18   0334  05/04/18   0320  05/03/18   0446   WBC  11.6*  13.2*  16.7*   HGB  9.8*  9.5*  9.6*   PLT  223  197  176   ANEU  8.6*  10.5*  13.8*   NA  137  138  137   K  3.9  3.9  4.2   GLU  201*  180*  209*   BUN  23*  22*  24*   CREA  0.87  0.83  0.78   ALT  28  28  19   SGOT  43*  46*  36   TBILI  0.5  0.5  0.5   AP  417*  336*  325*   CA  8.9  9.0  9.0   MG   --   2.0  1.5*   PHOS  3.3  2.5*  2.3*       -Assessment + Plan-     *) metastatic breast cancer. ER+, NE+, her-2 neg; bone scan: diffuse mets  ----- continue  Arimidex (hormonal AI therapy) started on 4/24  *) Anemia: suspect AOCD +- marrow involvement,  started procrit weekly on 4/23  ---- prbc x1 4/23, 4/26 . Stable  *) CHF. [place] Avelina Per cardiology. .  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 Required bronchoscopy to clear secretions again 5/1 5/5 - NG tube placed for stomach decompression- for SBO/ileus - large stomach on CT abdomen- per Gen surgery  If she cannot take po meds, her arimidex will need to be held for now.     No new recs from our standpoint

## 2018-05-05 NOTE — PROGRESS NOTES
Radiology  Left thoracentesis using US guidance without complication or significant blood loss was well tolerated. 900 ml clear yellow fluid. Labs sent.

## 2018-05-05 NOTE — PROGRESS NOTES
Bedside shift change report given to Mela Alex (oncoming nurse) by Pedro Johnson (offgoing nurse). Report included the following information SBAR, Kardex and MAR.

## 2018-05-05 NOTE — PROGRESS NOTES
ANDRE follow up. ANDRE spoke with Christine Moore Liaison [phone]), who said that she is following patient and that determination of acceptance by [facility] is still pending.

## 2018-05-05 NOTE — PROGRESS NOTES
Bedside shift change report given to Amadeo Hernandez (oncoming nurse) by Negra Garay (offgoing nurse). Report included the following information SBAR and Kardex.

## 2018-05-06 ENCOUNTER — APPOINTMENT (OUTPATIENT)
Dept: GENERAL RADIOLOGY | Age: 83
DRG: 853 | End: 2018-05-06
Attending: SURGERY
Payer: MEDICARE

## 2018-05-06 LAB
ALBUMIN SERPL-MCNC: 1.7 G/DL (ref 3.5–5)
ALBUMIN/GLOB SERPL: 0.4 {RATIO} (ref 1.1–2.2)
ALP SERPL-CCNC: 394 U/L (ref 45–117)
ALT SERPL-CCNC: 23 U/L (ref 12–78)
ANION GAP SERPL CALC-SCNC: 9 MMOL/L (ref 5–15)
AST SERPL-CCNC: 36 U/L (ref 15–37)
BASOPHILS # BLD: 0.1 K/UL (ref 0–0.1)
BASOPHILS NFR BLD: 1 % (ref 0–1)
BILIRUB SERPL-MCNC: 0.6 MG/DL (ref 0.2–1)
BUN SERPL-MCNC: 22 MG/DL (ref 6–20)
BUN/CREAT SERPL: 26 (ref 12–20)
CALCIUM SERPL-MCNC: 8.4 MG/DL (ref 8.5–10.1)
CHLORIDE SERPL-SCNC: 99 MMOL/L (ref 97–108)
CO2 SERPL-SCNC: 30 MMOL/L (ref 21–32)
CREAT SERPL-MCNC: 0.85 MG/DL (ref 0.55–1.02)
DIFFERENTIAL METHOD BLD: ABNORMAL
EOSINOPHIL # BLD: 0.2 K/UL (ref 0–0.4)
EOSINOPHIL NFR BLD: 2 % (ref 0–7)
ERYTHROCYTE [DISTWIDTH] IN BLOOD BY AUTOMATED COUNT: 21.7 % (ref 11.5–14.5)
GLOBULIN SER CALC-MCNC: 4.3 G/DL (ref 2–4)
GLUCOSE BLD STRIP.AUTO-MCNC: 104 MG/DL (ref 65–100)
GLUCOSE BLD STRIP.AUTO-MCNC: 109 MG/DL (ref 65–100)
GLUCOSE BLD STRIP.AUTO-MCNC: 111 MG/DL (ref 65–100)
GLUCOSE BLD STRIP.AUTO-MCNC: 119 MG/DL (ref 65–100)
GLUCOSE BLD STRIP.AUTO-MCNC: 179 MG/DL (ref 65–100)
GLUCOSE BLD STRIP.AUTO-MCNC: 75 MG/DL (ref 65–100)
GLUCOSE SERPL-MCNC: 103 MG/DL (ref 65–100)
HCT VFR BLD AUTO: 29.1 % (ref 35–47)
HGB BLD-MCNC: 9.2 G/DL (ref 11.5–16)
IMM GRANULOCYTES # BLD: 0.1 K/UL (ref 0–0.04)
IMM GRANULOCYTES NFR BLD AUTO: 1 % (ref 0–0.5)
LDH SERPL L TO P-CCNC: 249 U/L (ref 81–246)
LYMPHOCYTES # BLD: 1.3 K/UL (ref 0.8–3.5)
LYMPHOCYTES NFR BLD: 13 % (ref 12–49)
MCH RBC QN AUTO: 28.3 PG (ref 26–34)
MCHC RBC AUTO-ENTMCNC: 31.6 G/DL (ref 30–36.5)
MCV RBC AUTO: 89.5 FL (ref 80–99)
MONOCYTES # BLD: 1 K/UL (ref 0–1)
MONOCYTES NFR BLD: 10 % (ref 5–13)
NEUTS SEG # BLD: 7.5 K/UL (ref 1.8–8)
NEUTS SEG NFR BLD: 73 % (ref 32–75)
NRBC # BLD: 0 K/UL (ref 0–0.01)
NRBC BLD-RTO: 0 PER 100 WBC
PLATELET # BLD AUTO: 215 K/UL (ref 150–400)
PMV BLD AUTO: 10.5 FL (ref 8.9–12.9)
POTASSIUM SERPL-SCNC: 3.9 MMOL/L (ref 3.5–5.1)
PROT SERPL-MCNC: 6 G/DL (ref 6.4–8.2)
RBC # BLD AUTO: 3.25 M/UL (ref 3.8–5.2)
RBC MORPH BLD: ABNORMAL
SERVICE CMNT-IMP: ABNORMAL
SERVICE CMNT-IMP: NORMAL
SODIUM SERPL-SCNC: 138 MMOL/L (ref 136–145)
WBC # BLD AUTO: 10.2 K/UL (ref 3.6–11)

## 2018-05-06 PROCEDURE — 82962 GLUCOSE BLOOD TEST: CPT

## 2018-05-06 PROCEDURE — 74011000250 HC RX REV CODE- 250: Performed by: HOSPITALIST

## 2018-05-06 PROCEDURE — 74011250637 HC RX REV CODE- 250/637: Performed by: NURSE PRACTITIONER

## 2018-05-06 PROCEDURE — 74019 RADEX ABDOMEN 2 VIEWS: CPT

## 2018-05-06 PROCEDURE — 74011250636 HC RX REV CODE- 250/636: Performed by: HOSPITALIST

## 2018-05-06 PROCEDURE — 83615 LACTATE (LD) (LDH) ENZYME: CPT | Performed by: HOSPITALIST

## 2018-05-06 PROCEDURE — 74011250637 HC RX REV CODE- 250/637: Performed by: INTERNAL MEDICINE

## 2018-05-06 PROCEDURE — 85025 COMPLETE CBC W/AUTO DIFF WBC: CPT | Performed by: HOSPITALIST

## 2018-05-06 PROCEDURE — 74011000250 HC RX REV CODE- 250: Performed by: INTERNAL MEDICINE

## 2018-05-06 PROCEDURE — 80053 COMPREHEN METABOLIC PANEL: CPT | Performed by: HOSPITALIST

## 2018-05-06 PROCEDURE — 65660000000 HC RM CCU STEPDOWN

## 2018-05-06 PROCEDURE — 74011250637 HC RX REV CODE- 250/637: Performed by: HOSPITALIST

## 2018-05-06 PROCEDURE — 74011250636 HC RX REV CODE- 250/636: Performed by: INTERNAL MEDICINE

## 2018-05-06 PROCEDURE — 94640 AIRWAY INHALATION TREATMENT: CPT

## 2018-05-06 PROCEDURE — 74011000258 HC RX REV CODE- 258: Performed by: HOSPITALIST

## 2018-05-06 PROCEDURE — 74011636637 HC RX REV CODE- 636/637: Performed by: HOSPITALIST

## 2018-05-06 PROCEDURE — 36415 COLL VENOUS BLD VENIPUNCTURE: CPT | Performed by: HOSPITALIST

## 2018-05-06 RX ORDER — INSULIN GLARGINE 100 [IU]/ML
10 INJECTION, SOLUTION SUBCUTANEOUS DAILY
Status: DISCONTINUED | OUTPATIENT
Start: 2018-05-06 | End: 2018-05-11 | Stop reason: HOSPADM

## 2018-05-06 RX ORDER — IPRATROPIUM BROMIDE AND ALBUTEROL SULFATE 2.5; .5 MG/3ML; MG/3ML
3 SOLUTION RESPIRATORY (INHALATION)
Status: DISCONTINUED | OUTPATIENT
Start: 2018-05-06 | End: 2018-05-07

## 2018-05-06 RX ORDER — FUROSEMIDE 10 MG/ML
20 INJECTION INTRAMUSCULAR; INTRAVENOUS DAILY
Status: DISCONTINUED | OUTPATIENT
Start: 2018-05-06 | End: 2018-05-08

## 2018-05-06 RX ORDER — LISINOPRIL 5 MG/1
2.5 TABLET ORAL DAILY
Status: DISCONTINUED | OUTPATIENT
Start: 2018-05-06 | End: 2018-05-11 | Stop reason: HOSPADM

## 2018-05-06 RX ADMIN — THERA TABS 1 TABLET: TAB at 09:10

## 2018-05-06 RX ADMIN — ACETYLCYSTEINE 200 MG: 200 SOLUTION ORAL; RESPIRATORY (INHALATION) at 20:50

## 2018-05-06 RX ADMIN — Medication 10 ML: at 13:22

## 2018-05-06 RX ADMIN — ACETYLCYSTEINE 200 MG: 200 SOLUTION ORAL; RESPIRATORY (INHALATION) at 07:21

## 2018-05-06 RX ADMIN — INSULIN GLARGINE 10 UNITS: 100 INJECTION, SOLUTION SUBCUTANEOUS at 09:20

## 2018-05-06 RX ADMIN — IPRATROPIUM BROMIDE AND ALBUTEROL SULFATE 3 ML: .5; 3 SOLUTION RESPIRATORY (INHALATION) at 13:02

## 2018-05-06 RX ADMIN — DEXTROSE MONOHYDRATE 25 G: 25 INJECTION, SOLUTION INTRAVENOUS at 21:35

## 2018-05-06 RX ADMIN — CARVEDILOL 6.25 MG: 6.25 TABLET, FILM COATED ORAL at 17:15

## 2018-05-06 RX ADMIN — DIBASIC SODIUM PHOSPHATE, MONOBASIC POTASSIUM PHOSPHATE AND MONOBASIC SODIUM PHOSPHATE 1 TABLET: 852; 155; 130 TABLET ORAL at 09:10

## 2018-05-06 RX ADMIN — DIBASIC SODIUM PHOSPHATE, MONOBASIC POTASSIUM PHOSPHATE AND MONOBASIC SODIUM PHOSPHATE 1 TABLET: 852; 155; 130 TABLET ORAL at 19:50

## 2018-05-06 RX ADMIN — Medication 30 ML: at 22:00

## 2018-05-06 RX ADMIN — FUROSEMIDE 20 MG: 10 INJECTION, SOLUTION INTRAMUSCULAR; INTRAVENOUS at 09:11

## 2018-05-06 RX ADMIN — Medication 1 CAPSULE: at 09:10

## 2018-05-06 RX ADMIN — ACETYLCYSTEINE 200 MG: 200 SOLUTION ORAL; RESPIRATORY (INHALATION) at 02:00

## 2018-05-06 RX ADMIN — IPRATROPIUM BROMIDE AND ALBUTEROL SULFATE 3 ML: .5; 3 SOLUTION RESPIRATORY (INHALATION) at 07:22

## 2018-05-06 RX ADMIN — Medication 10 ML: at 22:00

## 2018-05-06 RX ADMIN — Medication 400 MG: at 09:10

## 2018-05-06 RX ADMIN — LEVOFLOXACIN 750 MG: 5 INJECTION, SOLUTION INTRAVENOUS at 13:21

## 2018-05-06 RX ADMIN — BUDESONIDE 500 MCG: 0.5 INHALANT RESPIRATORY (INHALATION) at 20:50

## 2018-05-06 RX ADMIN — Medication 10 ML: at 07:02

## 2018-05-06 RX ADMIN — MEROPENEM 500 MG: 500 INJECTION, POWDER, FOR SOLUTION INTRAVENOUS at 19:50

## 2018-05-06 RX ADMIN — CARVEDILOL 6.25 MG: 6.25 TABLET, FILM COATED ORAL at 07:01

## 2018-05-06 RX ADMIN — Medication 10 ML: at 19:51

## 2018-05-06 RX ADMIN — MEROPENEM 500 MG: 500 INJECTION, POWDER, FOR SOLUTION INTRAVENOUS at 13:21

## 2018-05-06 RX ADMIN — BUDESONIDE 500 MCG: 0.5 INHALANT RESPIRATORY (INHALATION) at 07:22

## 2018-05-06 RX ADMIN — Medication 10 ML: at 21:45

## 2018-05-06 RX ADMIN — SPIRONOLACTONE 25 MG: 25 TABLET, FILM COATED ORAL at 09:10

## 2018-05-06 RX ADMIN — MEROPENEM 500 MG: 500 INJECTION, POWDER, FOR SOLUTION INTRAVENOUS at 02:40

## 2018-05-06 RX ADMIN — ANASTROZOLE 1 MG: 1 TABLET, COATED ORAL at 09:09

## 2018-05-06 RX ADMIN — Medication 10 ML: at 07:01

## 2018-05-06 RX ADMIN — DEXTROSE MONOHYDRATE AND POTASSIUM CHLORIDE: 5; .149 INJECTION, SOLUTION INTRAVENOUS at 13:22

## 2018-05-06 RX ADMIN — LISINOPRIL 2.5 MG: 5 TABLET ORAL at 09:10

## 2018-05-06 RX ADMIN — ASPIRIN 81 MG 81 MG: 81 TABLET ORAL at 09:10

## 2018-05-06 RX ADMIN — IPRATROPIUM BROMIDE AND ALBUTEROL SULFATE 3 ML: .5; 3 SOLUTION RESPIRATORY (INHALATION) at 20:49

## 2018-05-06 NOTE — PROGRESS NOTES
Hospitalist Progress Note  Lulu Hunt MD  Answering service: [phone] OR [phone] from in house phone        Date of Service:  2018  NAME:  Jose Blum  :  1935  MRN:  [id number]      Admission Summary:   The patient is an 82-year-old female with history of diabetes and rheumatoid arthritis who initially presented to the emergency room via EMS with complaints of shortness of breath    Interval history / Subjective:   Patient lying comfortably in bed has no new complaints denies any shortness of breath passing gas but no BM yet  400 cc from NG tube last night. Repeat xray today continue NG and keep NPO. Thoracentesis done 900 cc drained. LDH pending. Assessment & Plan:     Acute on chronic hypoxic/hypercarbic respiratory failure due to pulmonary edema, pneumonia  -s/p bronchoscopy on  and on  suctioned and cleared  -respiratory culture grow on  and  grow candida albicans, on diflucan which is stopped today  -,resp distress and CXR worsening worsening of near complete left hemithorax opacification.  -On and off BIPAP  -S/p bronch and clearing of secretion on   -CXR shows Increased bilateral pleural effusions and lung opacities resulting in near  complete opacification of the left hemithorax. Ct showed  1. Large left pleural effusion and complete atelectasis of the left lung  resulting in opacification of the left hemithorax. 2. Moderate-sized right pleural effusion and complete atelectasis of the right  lower lobe. 3. Small pericardial effusion. 4. Widespread metastatic disease of the skeletal structures as described above.     ABDOMEN/PELVIS:     1. There is distention of the stomach and it least several small bowel loops. The possibility of at least partial small bowel obstruction cannot be excluded  by this limited examination.   2. Staghorn calculus of the left kidney without hydronephrosis    IR consulted for drainage. Pulmonology on consult. Acute encephalopathy possible due to metabolic. Resolved  -CT head on 4/24 volume loss and minimal white matter disease. No acute intracranial [place], Sinus mucosal inflammatory change  -MRI of brain 4/26 Study limited by motion artifact, volume loss and white matter disease. No definite intracranial metastasis however no intravenous contrast was administered due to poor renal function. 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 Sepsis secondary to E Coli UTI, pneumonia  POA  -urine cx  4/19 grow e coli, pseudomanas sp  -blood cx on 4/18, 4/19 and 4/22 no growth  Antinfectives  -Ceftriaxone 1 dose on 4/19  -Diflucan 4/26-present  -Levaquin 4/24-present  -Meropenem 4/24-present  -Zosyn 4/19-4/24  -Vancomycin 4/19-4/28    Stop Diflucan    NSTEMI  - on aspirin,coreg. Statin intolerant,she is on fish oil. She was getting Evolocumab injections out patient.  -cardiologist on board,possible cardiac cath next week    Acute systolic CHF NYHA Class IV  -Intermittent diuresis,on hydralazine, spironolactone,coreg   -BP is high and renal function has improved and stabilized. Start low dose lisinopril    JEROD on CKD stage III,creatinien improved  -creatinine improved and stabilized. Increase Lasix to 40 mg daily  -nephrologist on board    Metastatic left breast cancer with diffuse bone mets.+er,+Pr  -on armidex   -Hem/Onc on board    HTN  -BP stablecon hydralazine, coreg and spironolactone,  monitor BP. Added low dose lisinopril 5/3    DM-II  -Blood glucose 170-130s. She off the oral medications(Glimepiride,sitagliptin and metformin) she was taking PTA  -Add low dose Lantus 8 units sc daily,continue insulin sliding scale, monitor finger stick glucose    Hypothyroidism  -continue synthroid    Hx of GERD  -continue pepcid    Elevated liver enzyme   -possible related to liver lesion, monitor LFT    Anemia multifactorial  -Hgb 6.7, s/p 2 units pRBC on 4/26,   -Hgb 9.3, monitor H/H    Hx of colon cancer 25 years ago  -according to son, there is a work-up planned with [facility] regarding the possibility of recurrent CA (based on lesions seen in the calvarium on MRI - 3/19). -hem/onc on board    H/o stroke with residual weakness on the left    Diet,suspect silent aspiration. NPO as Ct showed SBO. Surgery consulted. --    Hypomagnesemia: iv mg today,on oral magnesium. H/O Seropositive RA of multiple sites,stable. -Apparently she was getting Etanercept sc injections out patient. Debility: PT/OT requested. She likely needs rehab on discharge        Code status: DNR  DVT prophylaxis:SCD    Care Plan discussed with: Patient/Family and Nurse  Disposition:in ICU    5/1,updated son at bedside and questions answered  Contact son, Jeff Smith . Hospital Problems  Date Reviewed: 4/16/2018          Codes Class Noted POA    Acute systolic heart failure ([facility] [address] ICD-10-CM: I50.21  ICD-9-CM: 428.21  4/24/2018 Unknown        Altered mental status, unspecified ICD-10-CM: R41.82  ICD-9-CM: 780.97  4/23/2018 Unknown        * (Principal)SOB (shortness of breath) ICD-10-CM: R06.02  ICD-9-CM: 786.05  4/16/2018 Unknown                Vital Signs:    Last 24hrs VS reviewed since prior progress note. Most recent are:  Visit Vitals    /63 (BP 1 Location: Left arm, BP Patient Position: At rest)    Pulse 84    Temp 99.3 °F (37.4 °C)    Resp 16    Ht 5' 1\" (1.549 m)    Wt 61.7 kg (136 lb)    SpO2 97%    BMI 25.7 kg/m2         Intake/Output Summary (Last 24 hours) at 05/06/18 0720  Last data filed at 05/06/18 4086   Gross per 24 hour   Intake                0 ml   Output             1900 ml   Net            -1900 ml        Physical Examination:             Constitutional:  Alert and conversant,on nasal canula   ENT:  Oral mucous moist, oropharynx benign. Neck supple,    Resp:  Decrease bronchial breath sound, few wheezing and rhonic bilaterally.  No accessory muscle use   CV: Regular rhythm, normal rate, no murmurs, gallops, rubs    GI:  Soft, non distended, non tender. normoactive bowel sounds, no hepatosplenomegaly     Musculoskeletal:  No edema    Neurologic:  Conscious and alert, oriented to place and person, answer questions, moves all extremities. Left side weak     Skin:  Good turgor, no rashes or ulcers       Data Review:    Review and/or order of clinical lab test      Labs:     Recent Labs      05/06/18 0355 05/05/18 0334   WBC  10.2  11.6*   HGB  9.2*  9.8*   HCT  29.1*  31.0*   PLT  215  223     Recent Labs      05/06/18 0355 05/05/18 0334 05/04/18   0320   NA  138  137  138   K  3.9  3.9  3.9   CL  99  100  102   CO2  30  29  31   BUN  22*  23*  22*   CREA  0.85  0.87  0.83   GLU  103*  201*  180*   CA  8.4*  8.9  9.0   MG   --    --   2.0   PHOS   --   3.3  2.5*     Recent Labs      05/06/18 0355 05/05/18 0334 05/04/18   0320   SGOT  36  43*  46*   ALT  23  28  28   AP  394*  417*  336*   TBILI  0.6  0.5  0.5   TP  6.0*  6.5  6.2*   ALB  1.7*  1.9*  1.8*   GLOB  4.3*  4.6*  4.4*     No results for input(s): INR, PTP, APTT in the last 72 hours. No lab exists for component: INREXT, INREXT   No results for input(s): FE, TIBC, PSAT, FERR in the last 72 hours. No results found for: FOL, RBCF   No results for input(s): PH, PCO2, PO2 in the last 72 hours. No results for input(s): CPK, CKNDX, TROIQ in the last 72 hours.     No lab exists for component: CPKMB  Lab Results   Component Value Date/Time    Cholesterol, total 74 04/16/2018 04:21 AM    HDL Cholesterol 25 04/16/2018 04:21 AM    LDL, calculated 31.6 04/16/2018 04:21 AM    Triglyceride 87 04/16/2018 04:21 AM    CHOL/HDL Ratio 3.0 04/16/2018 04:21 AM     Lab Results   Component Value Date/Time    Glucose (POC) 111 (H) 05/06/2018 06:21 AM    Glucose (POC) 122 (H) 05/05/2018 09:11 PM    Glucose (POC) 137 (H) 05/05/2018 04:34 PM    Glucose (POC) 146 (H) 05/05/2018 11:31 AM    Glucose (POC) 196 (H) 05/05/2018 06:48 AM     Lab Results   Component Value Date/Time    Color YELLOW/STRAW 04/20/2018 04:30 AM    Appearance TURBID (A) 04/20/2018 04:30 AM    Specific gravity 1.029 04/20/2018 04:30 AM    pH (UA) 5.0 04/20/2018 04:30 AM    Protein 100 (A) 04/20/2018 04:30 AM    Glucose NEGATIVE  04/20/2018 04:30 AM    Ketone NEGATIVE  04/20/2018 04:30 AM    Bilirubin NEGATIVE  04/20/2018 04:30 AM    Urobilinogen 0.2 04/20/2018 04:30 AM    Nitrites NEGATIVE  04/20/2018 04:30 AM    Leukocyte Esterase LARGE (A) 04/20/2018 04:30 AM    Epithelial cells FEW 04/20/2018 04:30 AM    Bacteria 3+ (A) 04/20/2018 04:30 AM    WBC >100 (H) 04/20/2018 04:30 AM    RBC 5-10 04/20/2018 04:30 AM         Medications Reviewed:     Current Facility-Administered Medications   Medication Dose Route Frequency    insulin glargine (LANTUS) injection 10 Units  10 Units SubCUTAneous DAILY    dextrose 5% with KCl 20 mEq/L infusion   IntraVENous CONTINUOUS    levoFLOXacin (LEVAQUIN) 750 mg in D5W IVPB  750 mg IntraVENous Q48H    furosemide (LASIX) injection 40 mg  40 mg IntraVENous DAILY    lactulose (CHRONULAC) solution 10 g  15 mL Oral BID    phosphorus (K PHOS NEUTRAL) 250 mg tablet 1 Tab  1 Tab Oral BID    [START ON 5/8/2018] levothyroxine (SYNTHROID) injection 44 mcg  44 mcg IntraVENous Q24H    lisinopril (PRINIVIL, ZESTRIL) tablet 5 mg  5 mg Oral DAILY    albuterol-ipratropium (DUO-NEB) 2.5 MG-0.5 MG/3 ML  3 mL Nebulization Q6H RT    acetylcysteine (MUCOMYST) 200 mg/mL (20 %) solution 200 mg  200 mg Nebulization TID RT    lactobac ac& pc-s.therm-b.anim (BERYL Q/RISAQUAD)  1 Cap Oral DAILY    ascorbic acid (vitamin C) (VITAMIN C) tablet 1,000 mg  1,000 mg Oral DAILY    polyethylene glycol (MIRALAX) packet 17 g  17 g Oral DAILY    naloxone (NARCAN) injection 0.4 mg  0.4 mg IntraVENous Multiple    flumazenil (ROMAZICON) 0.1 mg/mL injection 0.2 mg  0.2 mg IntraVENous Multiple    midazolam (VERSED) injection 0.25-5 mg  0.25-5 mg IntraVENous Multiple    carvedilol (COREG) tablet 6.25 mg  6.25 mg Oral BID WITH MEALS    meropenem (MERREM) 500 mg in 0.9% sodium chloride (MBP/ADV) 50 mL  0.5 g IntraVENous Q8H    aspirin chewable tablet 81 mg  81 mg Oral DAILY    spironolactone (ALDACTONE) tablet 25 mg  25 mg Oral DAILY    acetaminophen (TYLENOL) suppository 650 mg  650 mg Rectal Q4H PRN    sodium chloride (NS) flush 5-10 mL  5-10 mL IntraVENous Q8H    sodium chloride (NS) flush 5-10 mL  5-10 mL IntraVENous PRN    budesonide (PULMICORT) 500 mcg/2 ml nebulizer suspension  500 mcg Nebulization BID RT    hydrALAZINE (APRESOLINE) 20 mg/mL injection 10 mg  10 mg IntraVENous Q6H PRN    0.9% sodium chloride infusion 250 mL  250 mL IntraVENous PRN    sodium chloride (NS) flush 20 mL  20 mL InterCATHeter PRN    sodium chloride (NS) flush 10 mL  10 mL InterCATHeter Q24H    sodium chloride (NS) flush 10 mL  10 mL InterCATHeter PRN    sodium chloride (NS) flush 10 mL  10 mL InterCATHeter Q8H    alteplase (CATHFLO) 1 mg in sterile water (preservative free) 1 mL injection  1 mg InterCATHeter PRN    bacitracin 500 unit/gram packet 1 Packet  1 Packet Topical PRN    sodium chloride (NS) flush 10-30 mL  10-30 mL InterCATHeter PRN    sodium chloride (NS) flush 10 mL  10 mL InterCATHeter Q24H    sodium chloride (NS) flush 10 mL  10 mL InterCATHeter PRN    sodium chloride (NS) flush 10-40 mL  10-40 mL InterCATHeter Q8H    sodium chloride (NS) flush 20 mL  20 mL InterCATHeter Q24H    anastrozole (ARIMIDEX) tablet 1 mg  1 mg Oral DAILY    epoetin celio (EPOGEN;PROCRIT) injection 10,000 Units  10,000 Units SubCUTAneous Q MON, WED & FRI    albuterol-ipratropium (DUO-NEB) 2.5 MG-0.5 MG/3 ML  3 mL Nebulization Q6H PRN    glucose chewable tablet 16 g  4 Tab Oral PRN    dextrose (D50W) injection syrg 12.5-25 g  12.5-25 g IntraVENous PRN    glucagon (GLUCAGEN) injection 1 mg  1 mg IntraMUSCular PRN    insulin lispro (HUMALOG) injection   SubCUTAneous AC&HS    prochlorperazine (COMPAZINE) with saline injection 5 mg  5 mg IntraVENous J7P PRN    folic acid (FOLVITE) tablet 1 mg  1 mg Oral DAILY    magnesium oxide (MAG-OX) tablet 400 mg  400 mg Oral DAILY    therapeutic multivitamin (THERAGRAN) tablet 1 Tab  1 Tab Oral DAILY    fish oil-omega-3 fatty acids 340-1,000 mg capsule 1 Cap  1 Cap Oral DAILY    sodium chloride (NS) flush 5-10 mL  5-10 mL IntraVENous Q8H    sodium chloride (NS) flush 5-10 mL  5-10 mL IntraVENous PRN    ondansetron (ZOFRAN) injection 4 mg  4 mg IntraVENous Q4H PRN     ______________________________________________________________________  EXPECTED LENGTH OF STAY: 4d 21h  ACTUAL LENGTH OF STAY:          [id number], [place]

## 2018-05-06 NOTE — PROGRESS NOTES
General Surgery Daily Progress Note    Admit Date: 2018  Post-Operative Day: 10 Days Post-Op from * No procedures listed *     Subjective:     Last 24 hrs: Patient resting comfortably in bed this morning. No complaints. Tolerating NGT. Belly feels less distended. Denies abdominal pain. No NV. Passing gas. Scheduled for KUB later this morning. Objective:     Blood pressure 107/63, pulse 84, temperature 99.3 °F (37.4 °C), resp. rate 16, height 5' 1\" (1.549 m), weight 136 lb (61.7 kg), SpO2 97 %. Temp (24hrs), Av.6 °F (37 °C), Min:98 °F (36.7 °C), Max:99.3 °F (37.4 °C)      _____________________  Physical Exam:     Alert, cooperative, in no acute distress. Cardiovascular: RRR  Lungs:CTAB   Abdomen: soft, mild distention. Nontender. +BS  400ml of dark green fluid in canister      Assessment:   Principal Problem:    SOB (shortness of breath) (2018)    Active Problems:    Altered mental status, unspecified (2018)      Acute systolic heart failure ([address] (2018)            Plan:     Continue NGT and monitor output. NPO with ice chips ok  Abd XR ordered for later this morning  Further treatment per Dr. Adriana Templeton      Data Review:    Recent Labs      18   0355  18   0334  18   0320   WBC  10.2  11.6*  13.2*   HGB  9.2*  9.8*  9.5*   HCT  29.1*  31.0*  29.9*   PLT  215  223  197     Recent Labs      18   0355  18   0334  18   0320   NA  138  137  138   K  3.9  3.9  3.9   CL  99  100  102   CO2  30  29  31   GLU  103*  201*  180*   BUN  22*  23*  22*   CREA  0.85  0.87  0.83   CA  8.4*  8.9  9.0   MG   --    --   2.0   PHOS   --   3.3  2.5*   ALB  1.7*  1.9*  1.8*   SGOT  36  43*  46*   ALT  23  28  28     No results for input(s): AML, LPSE in the last 72 hours.         ______________________  Medications:    Current Facility-Administered Medications   Medication Dose Route Frequency    dextrose 5% with KCl 20 mEq/L infusion   IntraVENous CONTINUOUS    levoFLOXacin (LEVAQUIN) 750 mg in D5W IVPB  750 mg IntraVENous Q48H    furosemide (LASIX) injection 40 mg  40 mg IntraVENous DAILY    insulin glargine (LANTUS) injection 15 Units  15 Units SubCUTAneous DAILY    bisacodyl (DULCOLAX) tablet 5 mg  5 mg Oral DAILY    lactulose (CHRONULAC) solution 10 g  15 mL Oral BID    phosphorus (K PHOS NEUTRAL) 250 mg tablet 1 Tab  1 Tab Oral BID    [START ON 5/8/2018] levothyroxine (SYNTHROID) injection 44 mcg  44 mcg IntraVENous Q24H    lisinopril (PRINIVIL, ZESTRIL) tablet 5 mg  5 mg Oral DAILY    albuterol-ipratropium (DUO-NEB) 2.5 MG-0.5 MG/3 ML  3 mL Nebulization Q6H RT    acetylcysteine (MUCOMYST) 200 mg/mL (20 %) solution 200 mg  200 mg Nebulization TID RT    lactobac ac& pc-s.therm-b.anim (BERYL Q/RISAQUAD)  1 Cap Oral DAILY    ascorbic acid (vitamin C) (VITAMIN C) tablet 1,000 mg  1,000 mg Oral DAILY    polyethylene glycol (MIRALAX) packet 17 g  17 g Oral DAILY    naloxone (NARCAN) injection 0.4 mg  0.4 mg IntraVENous Multiple    flumazenil (ROMAZICON) 0.1 mg/mL injection 0.2 mg  0.2 mg IntraVENous Multiple    midazolam (VERSED) injection 0.25-5 mg  0.25-5 mg IntraVENous Multiple    carvedilol (COREG) tablet 6.25 mg  6.25 mg Oral BID WITH MEALS    meropenem (MERREM) 500 mg in 0.9% sodium chloride (MBP/ADV) 50 mL  0.5 g IntraVENous Q8H    aspirin chewable tablet 81 mg  81 mg Oral DAILY    spironolactone (ALDACTONE) tablet 25 mg  25 mg Oral DAILY    acetaminophen (TYLENOL) suppository 650 mg  650 mg Rectal Q4H PRN    sodium chloride (NS) flush 5-10 mL  5-10 mL IntraVENous Q8H    sodium chloride (NS) flush 5-10 mL  5-10 mL IntraVENous PRN    budesonide (PULMICORT) 500 mcg/2 ml nebulizer suspension  500 mcg Nebulization BID RT    hydrALAZINE (APRESOLINE) 20 mg/mL injection 10 mg  10 mg IntraVENous Q6H PRN    0.9% sodium chloride infusion 250 mL  250 mL IntraVENous PRN    sodium chloride (NS) flush 20 mL  20 mL InterCATHeter PRN    sodium chloride (NS) flush 10 mL  10 mL InterCATHeter Q24H    sodium chloride (NS) flush 10 mL  10 mL InterCATHeter PRN    sodium chloride (NS) flush 10 mL  10 mL InterCATHeter Q8H    alteplase (CATHFLO) 1 mg in sterile water (preservative free) 1 mL injection  1 mg InterCATHeter PRN    bacitracin 500 unit/gram packet 1 Packet  1 Packet Topical PRN    sodium chloride (NS) flush 10-30 mL  10-30 mL InterCATHeter PRN    sodium chloride (NS) flush 10 mL  10 mL InterCATHeter Q24H    sodium chloride (NS) flush 10 mL  10 mL InterCATHeter PRN    sodium chloride (NS) flush 10-40 mL  10-40 mL InterCATHeter Q8H    sodium chloride (NS) flush 20 mL  20 mL InterCATHeter Q24H    anastrozole (ARIMIDEX) tablet 1 mg  1 mg Oral DAILY    epoetin celio (EPOGEN;PROCRIT) injection 10,000 Units  10,000 Units SubCUTAneous Q MON, WED & FRI    albuterol-ipratropium (DUO-NEB) 2.5 MG-0.5 MG/3 ML  3 mL Nebulization Q6H PRN    glucose chewable tablet 16 g  4 Tab Oral PRN    dextrose (D50W) injection syrg 12.5-25 g  12.5-25 g IntraVENous PRN    glucagon (GLUCAGEN) injection 1 mg  1 mg IntraMUSCular PRN    insulin lispro (HUMALOG) injection   SubCUTAneous AC&HS    prochlorperazine (COMPAZINE) with saline injection 5 mg  5 mg IntraVENous Q8H PRN    cholecalciferol (VITAMIN D3) tablet 1,000 Units  1,000 Units Oral DAILY    folic acid (FOLVITE) tablet 1 mg  1 mg Oral DAILY    magnesium oxide (MAG-OX) tablet 400 mg  400 mg Oral DAILY    therapeutic multivitamin (THERAGRAN) tablet 1 Tab  1 Tab Oral DAILY    fish oil-omega-3 fatty acids 340-1,000 mg capsule 1 Cap  1 Cap Oral DAILY    sodium chloride (NS) flush 5-10 mL  5-10 mL IntraVENous Q8H    sodium chloride (NS) flush 5-10 mL  5-10 mL IntraVENous PRN    ondansetron (ZOFRAN) injection 4 mg  4 mg IntraVENous Q4H PRN       Christie Peñaloza PA-C  5/6/2018

## 2018-05-06 NOTE — PROGRESS NOTES
Cardiology Progress Note            Admit Date: 4/16/2018  Admit Diagnosis: SOB (shortness of breath);colon; Anemia;COLON  Date: 5/6/2018     Time: 2:01 PM    Subjective:   States she is hungry. Still with some SOB but overall feels a little better. Denies chest pain. Assessment and Plan     1. NSTEMI:     -No chest pain. -Repeat TTE:4/27/18  EF unchanged 45% with mild hypokinesis  basal-mid anteroseptal and apical walls. -ASA. Continue off Plavix   -Continue coreg 6.25 mg BID   -Statin intolerant due to weakness on zocor, on repatha, last LDL 31, so will not rechallenge statin. On fish-oil.   -might consider LHC next week if pt continues to improve       2. Cardiomyopathy/Acute HFrEF, new:  EF 45% NYHA IV     -Lisinopril 5 mg po daily (watch creatinine). -Coreg to 6.25 mg BID today.   -Daily weights, I/Os   -Continue aldactone.    -Will add low dose po Lasix 20 mg daily. - Monitor labs        3. JEROD on CKD:   Creatinine normalized. GFR >60   -Nephrology following. 4. Acute hypoxic respiratory failure:multifactorial.  On room air today- sats 98%   -difficulty managing secretions   -Spironolactone   -Add Lasix 40 mg po daily   -CXR today     5. Anemia:hgb stable 9.5 On procrit. 6. HTN- BP labile.   -Coreg to 6.25 mg BID (was increased 5/1 PM)   -Hydralazine 25 mg TID   -Low dose ACE-I    7. Leukocytosis/Ecoli UTI/PNA:  ID following WBC trending down. 8. Hx of Colon cancer, now metastatic breast ca (bone, liver,calvarium mets) on Arimedex  -Hematology oncology following. On Arimesex. 9. Advanced directives:  No shock or CPR, DNR Palliative care following. 10. Deconditioned:  Very weak. Will need PT and rehab-     On NG suction will delay cath indefinitely, could not reliably get antiplatelet meds if stent needed. Milagros Waller MD 0900. Cardiology Attending:Patient seen and examined.   I agree with NP assessment and plans. Very fragile. Afshan Coley MD 5/6/2018 12:23 PM             Objective:      Physical Exam:                Visit Vitals    /76 (BP 1 Location: Left arm, BP Patient Position: At rest)    Pulse 76    Temp 98.4 °F (36.9 °C)    Resp 16    Ht 5' 1\" (1.549 m)    Wt 61.7 kg (136 lb)    SpO2 99%    BMI 25.7 kg/m2          General Appearance:   elderly female, NAD, on RA   Ears/Nose/Mouth/Throat:    Hearing grossly normal.         Neck:  Supple. Chest:     Course breath sounds bilaterally. Mildly Decreased breath sounds on left. Cardiovascular:   Regular rate and rhythm, S1, S2 normal, no murmur   Abdomen:    Soft, nontender, no gaurding. Extremities:  No edema bilaterally. Skin:  Warm and dry. Telemetry: Sinus rhythm-       Data Review:    Labs:    Recent Results (from the past 24 hour(s))   GLUCOSE, POC    Collection Time: 05/05/18  4:34 PM   Result Value Ref Range    Glucose (POC) 137 (H) 65 - 100 mg/dL    Performed by Jason Zapien    GLUCOSE, POC    Collection Time: 05/05/18  9:11 PM   Result Value Ref Range    Glucose (POC) 122 (H) 65 - 100 mg/dL    Performed by [email or web address], COMPREHENSIVE    Collection Time: 05/06/18  3:55 AM   Result Value Ref Range    Sodium 138 136 - 145 mmol/L    Potassium 3.9 3.5 - 5.1 mmol/L    Chloride 99 97 - 108 mmol/L    CO2 30 21 - 32 mmol/L    Anion gap 9 5 - 15 mmol/L    Glucose 103 (H) 65 - 100 mg/dL    BUN 22 (H) 6 - 20 MG/DL    Creatinine 0.85 0.55 - 1.02 MG/DL    BUN/Creatinine ratio 26 (H) 12 - 20      GFR est AA >60 >60 ml/min/1.73m2    GFR est non-AA >60 >60 ml/min/1.73m2    Calcium 8.4 (L) 8.5 - 10.1 MG/DL    Bilirubin, total 0.6 0.2 - 1.0 MG/DL    ALT (SGPT) 23 12 - 78 U/L    AST (SGOT) 36 15 - 37 U/L    Alk.  phosphatase 394 (H) 45 - 117 U/L    Protein, total 6.0 (L) 6.4 - 8.2 g/dL    Albumin 1.7 (L) 3.5 - 5.0 g/dL    Globulin 4.3 (H) 2.0 - 4.0 g/dL    A-G Ratio 0.4 (L) 1.1 - 2.2     CBC WITH AUTOMATED DIFF    Collection Time: 05/06/18  3:55 AM   Result Value Ref Range    WBC 10.2 3.6 - 11.0 K/uL    RBC 3.25 (L) 3.80 - 5.20 M/uL    HGB 9.2 (L) 11.5 - 16.0 g/dL    HCT 29.1 (L) 35.0 - 47.0 %    MCV 89.5 80.0 - 99.0 FL    MCH 28.3 26.0 - 34.0 PG    MCHC 31.6 30.0 - 36.5 g/dL    RDW 21.7 (H) 11.5 - 14.5 %    PLATELET 844 535 - 710 K/uL    MPV 10.5 8.9 - 12.9 FL    NRBC 0.0 0  WBC    ABSOLUTE NRBC 0.00 0.00 - 0.01 K/uL    NEUTROPHILS 73 32 - 75 %    LYMPHOCYTES 13 12 - 49 %    MONOCYTES 10 5 - 13 %    EOSINOPHILS 2 0 - 7 %    BASOPHILS 1 0 - 1 %    IMMATURE GRANULOCYTES 1 (H) 0.0 - 0.5 %    ABS. NEUTROPHILS 7.5 1.8 - 8.0 K/UL    ABS. LYMPHOCYTES 1.3 0.8 - 3.5 K/UL    ABS. MONOCYTES 1.0 0.0 - 1.0 K/UL    ABS. EOSINOPHILS 0.2 0.0 - 0.4 K/UL    ABS. BASOPHILS 0.1 0.0 - 0.1 K/UL    ABS. IMM.  GRANS. 0.1 (H) 0.00 - 0.04 K/UL    DF SMEAR SCANNED      RBC COMMENTS TARGET CELLS  PRESENT        RBC COMMENTS ANISOCYTOSIS  2+        RBC COMMENTS OVALOCYTES  PRESENT       LD    Collection Time: 05/06/18  3:55 AM   Result Value Ref Range     (H) 81 - 246 U/L   GLUCOSE, POC    Collection Time: 05/06/18  6:21 AM   Result Value Ref Range    Glucose (POC) 111 (H) 65 - 100 mg/dL    Performed by MARIELLE GREGORY    GLUCOSE, POC    Collection Time: 05/06/18 12:09 PM   Result Value Ref Range    Glucose (POC) 104 (H) 65 - 100 mg/dL    Performed by Greer Patel           Radiology:        Current Facility-Administered Medications   Medication Dose Route Frequency    insulin glargine (LANTUS) injection 10 Units  10 Units SubCUTAneous DAILY    furosemide (LASIX) injection 20 mg  20 mg IntraVENous DAILY    lisinopril (PRINIVIL, ZESTRIL) tablet 2.5 mg  2.5 mg Oral DAILY    dextrose 5% with KCl 20 mEq/L infusion   IntraVENous CONTINUOUS    levoFLOXacin (LEVAQUIN) 750 mg in D5W IVPB  750 mg IntraVENous Q48H    phosphorus (K PHOS NEUTRAL) 250 mg tablet 1 Tab  1 Tab Oral BID    [START ON 5/8/2018] levothyroxine (SYNTHROID) injection 44 mcg  44 mcg IntraVENous Q24H    albuterol-ipratropium (DUO-NEB) 2.5 MG-0.5 MG/3 ML  3 mL Nebulization Q6H RT    acetylcysteine (MUCOMYST) 200 mg/mL (20 %) solution 200 mg  200 mg Nebulization TID RT    lactobac ac& pc-s.therm-b.anim (BERYL Q/RISAQUAD)  1 Cap Oral DAILY    naloxone (NARCAN) injection 0.4 mg  0.4 mg IntraVENous Multiple    flumazenil (ROMAZICON) 0.1 mg/mL injection 0.2 mg  0.2 mg IntraVENous Multiple    midazolam (VERSED) injection 0.25-5 mg  0.25-5 mg IntraVENous Multiple    carvedilol (COREG) tablet 6.25 mg  6.25 mg Oral BID WITH MEALS    meropenem (MERREM) 500 mg in 0.9% sodium chloride (MBP/ADV) 50 mL  0.5 g IntraVENous Q8H    aspirin chewable tablet 81 mg  81 mg Oral DAILY    spironolactone (ALDACTONE) tablet 25 mg  25 mg Oral DAILY    acetaminophen (TYLENOL) suppository 650 mg  650 mg Rectal Q4H PRN    sodium chloride (NS) flush 5-10 mL  5-10 mL IntraVENous Q8H    sodium chloride (NS) flush 5-10 mL  5-10 mL IntraVENous PRN    budesonide (PULMICORT) 500 mcg/2 ml nebulizer suspension  500 mcg Nebulization BID RT    hydrALAZINE (APRESOLINE) 20 mg/mL injection 10 mg  10 mg IntraVENous Q6H PRN    0.9% sodium chloride infusion 250 mL  250 mL IntraVENous PRN    sodium chloride (NS) flush 20 mL  20 mL InterCATHeter PRN    sodium chloride (NS) flush 10 mL  10 mL InterCATHeter Q24H    sodium chloride (NS) flush 10 mL  10 mL InterCATHeter PRN    sodium chloride (NS) flush 10 mL  10 mL InterCATHeter Q8H    alteplase (CATHFLO) 1 mg in sterile water (preservative free) 1 mL injection  1 mg InterCATHeter PRN    bacitracin 500 unit/gram packet 1 Packet  1 Packet Topical PRN    sodium chloride (NS) flush 10-30 mL  10-30 mL InterCATHeter PRN    sodium chloride (NS) flush 10 mL  10 mL InterCATHeter Q24H    sodium chloride (NS) flush 10 mL  10 mL InterCATHeter PRN    sodium chloride (NS) flush 10-40 mL  10-40 mL InterCATHeter Q8H    sodium chloride (NS) flush 20 mL  20 mL InterCATHeter Q24H    anastrozole (ARIMIDEX) tablet 1 mg  1 mg Oral DAILY    epoetin celio (EPOGEN;PROCRIT) injection 10,000 Units  10,000 Units SubCUTAneous Q MON, WED & FRI    albuterol-ipratropium (DUO-NEB) 2.5 MG-0.5 MG/3 ML  3 mL Nebulization Q6H PRN    glucose chewable tablet 16 g  4 Tab Oral PRN    dextrose (D50W) injection syrg 12.5-25 g  12.5-25 g IntraVENous PRN    glucagon (GLUCAGEN) injection 1 mg  1 mg IntraMUSCular PRN    insulin lispro (HUMALOG) injection   SubCUTAneous AC&HS    prochlorperazine (COMPAZINE) with saline injection 5 mg  5 mg IntraVENous Q8H PRN    magnesium oxide (MAG-OX) tablet 400 mg  400 mg Oral DAILY    therapeutic multivitamin (THERAGRAN) tablet 1 Tab  1 Tab Oral DAILY    sodium chloride (NS) flush 5-10 mL  5-10 mL IntraVENous Q8H    sodium chloride (NS) flush 5-10 mL  5-10 mL IntraVENous PRN    ondansetron (ZOFRAN) injection 4 mg  4 mg IntraVENous Q4H PRN     Pt critically ill, poor prognosis, multiple severe issues.        Amina Diggs MD     [facility] [address] [facility] [address], [address] [facility], [place] [address]   Radha [place]   [phone]

## 2018-05-06 NOTE — PROGRESS NOTES
ID Progress Note  2018    Subjective:     She remains weak. Objective:     Antibiotics:  1. Merrem  2. Levaquin  3. Fluconazole      Vitals:   Visit Vitals    /76 (BP 1 Location: Left arm, BP Patient Position: At rest)    Pulse 76    Temp 98.4 °F (36.9 °C)    Resp 16    Ht 5' 1\" (1.549 m)    Wt 61.7 kg (136 lb)    SpO2 99%    BMI 25.7 kg/m2        Tmax:  Temp (24hrs), Av.7 °F (37.1 °C), Min:98.4 °F (36.9 °C), Max:99.3 °F (37.4 °C)      Exam:  Lungs:  rhonchi R anterior  Heart:  regular rate and rhythm  Abdomen:  soft, non-tender. Bowel sounds normal. No masses,  no organomegaly  Skin:  no rash or abnormalities    Labs:      Recent Labs      18   0355  18   0334  18   0320   WBC  10.2  11.6*  13.2*   HGB  9.2*  9.8*  9.5*   PLT  215  223  197   BUN  22*  23*  22*   CREA  0.85  0.87  0.83   SGOT  36  43*  46*   AP  394*  417*  336*   TBILI  0.6  0.5  0.5       Cultures:     No results found for: ANIYAH  Lab Results   Component Value Date/Time    Culture result: NO GROWTH AFTER 19 HOURS 2018 11:05 AM    Culture result: LIGHT YEAST (A) 2018 11:30 AM    Culture result: LIGHT NORMAL RESPIRATORY BERYL 2018 11:30 AM       Radiology: X ray with increased opacification on the left    Line/Insert Date:           Assessment:     1. Pneumonia  1. Thoracentesis today  2. Metastatic cancer  3. Debility  4. Candida from sputum (uncertain significance)  5. Leukocytosis--WBC up and down--again improved today    Objective:     1. Continue current therapy  2. Follow up pending cultures and studies  3. Aggressive pulmonary toilet  4.  D/W tia Keller MD

## 2018-05-07 ENCOUNTER — TELEPHONE (OUTPATIENT)
Dept: RHEUMATOLOGY | Age: 83
End: 2018-05-07

## 2018-05-07 ENCOUNTER — APPOINTMENT (OUTPATIENT)
Dept: GENERAL RADIOLOGY | Age: 83
DRG: 853 | End: 2018-05-07
Attending: HOSPITALIST
Payer: MEDICARE

## 2018-05-07 LAB
ALBUMIN SERPL-MCNC: 1.7 G/DL (ref 3.5–5)
ALBUMIN/GLOB SERPL: 0.4 {RATIO} (ref 1.1–2.2)
ALP SERPL-CCNC: 396 U/L (ref 45–117)
ALT SERPL-CCNC: 20 U/L (ref 12–78)
ANION GAP SERPL CALC-SCNC: 9 MMOL/L (ref 5–15)
AST SERPL-CCNC: 37 U/L (ref 15–37)
BACTERIA SPEC CULT: ABNORMAL
BACTERIA SPEC CULT: ABNORMAL
BASOPHILS # BLD: 0.1 K/UL (ref 0–0.1)
BASOPHILS NFR BLD: 1 % (ref 0–1)
BILIRUB SERPL-MCNC: 0.6 MG/DL (ref 0.2–1)
BUN SERPL-MCNC: 21 MG/DL (ref 6–20)
BUN/CREAT SERPL: 26 (ref 12–20)
CALCIUM SERPL-MCNC: 8.2 MG/DL (ref 8.5–10.1)
CHLORIDE SERPL-SCNC: 97 MMOL/L (ref 97–108)
CO2 SERPL-SCNC: 29 MMOL/L (ref 21–32)
CREAT SERPL-MCNC: 0.81 MG/DL (ref 0.55–1.02)
DIFFERENTIAL METHOD BLD: ABNORMAL
EOSINOPHIL # BLD: 0.2 K/UL (ref 0–0.4)
EOSINOPHIL NFR BLD: 2 % (ref 0–7)
ERYTHROCYTE [DISTWIDTH] IN BLOOD BY AUTOMATED COUNT: 21.4 % (ref 11.5–14.5)
GLOBULIN SER CALC-MCNC: 4.4 G/DL (ref 2–4)
GLUCOSE BLD STRIP.AUTO-MCNC: 101 MG/DL (ref 65–100)
GLUCOSE BLD STRIP.AUTO-MCNC: 116 MG/DL (ref 65–100)
GLUCOSE BLD STRIP.AUTO-MCNC: 206 MG/DL (ref 65–100)
GLUCOSE BLD STRIP.AUTO-MCNC: 261 MG/DL (ref 65–100)
GLUCOSE SERPL-MCNC: 97 MG/DL (ref 65–100)
HCT VFR BLD AUTO: 28.5 % (ref 35–47)
HGB BLD-MCNC: 9 G/DL (ref 11.5–16)
IMM GRANULOCYTES # BLD: 0.1 K/UL (ref 0–0.04)
IMM GRANULOCYTES NFR BLD AUTO: 1 % (ref 0–0.5)
LYMPHOCYTES # BLD: 1.3 K/UL (ref 0.8–3.5)
LYMPHOCYTES NFR BLD: 13 % (ref 12–49)
MCH RBC QN AUTO: 28.4 PG (ref 26–34)
MCHC RBC AUTO-ENTMCNC: 31.6 G/DL (ref 30–36.5)
MCV RBC AUTO: 89.9 FL (ref 80–99)
MONOCYTES # BLD: 1.1 K/UL (ref 0–1)
MONOCYTES NFR BLD: 11 % (ref 5–13)
NEUTS SEG # BLD: 6.9 K/UL (ref 1.8–8)
NEUTS SEG NFR BLD: 72 % (ref 32–75)
NRBC # BLD: 0 K/UL (ref 0–0.01)
NRBC BLD-RTO: 0 PER 100 WBC
PHOSPHATE SERPL-MCNC: 3.2 MG/DL (ref 2.6–4.7)
PLATELET # BLD AUTO: 216 K/UL (ref 150–400)
PLATELET COMMENTS,PCOM: ABNORMAL
PMV BLD AUTO: 10.5 FL (ref 8.9–12.9)
POTASSIUM SERPL-SCNC: 3.7 MMOL/L (ref 3.5–5.1)
PROT SERPL-MCNC: 6.1 G/DL (ref 6.4–8.2)
RBC # BLD AUTO: 3.17 M/UL (ref 3.8–5.2)
RBC MORPH BLD: ABNORMAL
SERVICE CMNT-IMP: ABNORMAL
SODIUM SERPL-SCNC: 135 MMOL/L (ref 136–145)
WBC # BLD AUTO: 9.7 K/UL (ref 3.6–11)
WBC MORPH BLD: ABNORMAL

## 2018-05-07 PROCEDURE — 74011000250 HC RX REV CODE- 250: Performed by: INTERNAL MEDICINE

## 2018-05-07 PROCEDURE — 74011250636 HC RX REV CODE- 250/636: Performed by: HOSPITALIST

## 2018-05-07 PROCEDURE — 85025 COMPLETE CBC W/AUTO DIFF WBC: CPT | Performed by: HOSPITALIST

## 2018-05-07 PROCEDURE — 74011250637 HC RX REV CODE- 250/637: Performed by: HOSPITALIST

## 2018-05-07 PROCEDURE — 65660000000 HC RM CCU STEPDOWN

## 2018-05-07 PROCEDURE — 84100 ASSAY OF PHOSPHORUS: CPT | Performed by: HOSPITALIST

## 2018-05-07 PROCEDURE — 74011000250 HC RX REV CODE- 250: Performed by: HOSPITALIST

## 2018-05-07 PROCEDURE — 97535 SELF CARE MNGMENT TRAINING: CPT

## 2018-05-07 PROCEDURE — 36415 COLL VENOUS BLD VENIPUNCTURE: CPT | Performed by: HOSPITALIST

## 2018-05-07 PROCEDURE — 74019 RADEX ABDOMEN 2 VIEWS: CPT

## 2018-05-07 PROCEDURE — 74011636637 HC RX REV CODE- 636/637: Performed by: HOSPITALIST

## 2018-05-07 PROCEDURE — 74011250636 HC RX REV CODE- 250/636: Performed by: INTERNAL MEDICINE

## 2018-05-07 PROCEDURE — 97530 THERAPEUTIC ACTIVITIES: CPT

## 2018-05-07 PROCEDURE — 94640 AIRWAY INHALATION TREATMENT: CPT

## 2018-05-07 PROCEDURE — 97110 THERAPEUTIC EXERCISES: CPT

## 2018-05-07 PROCEDURE — 74011000258 HC RX REV CODE- 258: Performed by: HOSPITALIST

## 2018-05-07 PROCEDURE — 74011250637 HC RX REV CODE- 250/637: Performed by: NURSE PRACTITIONER

## 2018-05-07 PROCEDURE — 74011250637 HC RX REV CODE- 250/637: Performed by: INTERNAL MEDICINE

## 2018-05-07 PROCEDURE — 77010033678 HC OXYGEN DAILY

## 2018-05-07 PROCEDURE — 80053 COMPREHEN METABOLIC PANEL: CPT | Performed by: HOSPITALIST

## 2018-05-07 PROCEDURE — 82962 GLUCOSE BLOOD TEST: CPT

## 2018-05-07 RX ORDER — IPRATROPIUM BROMIDE AND ALBUTEROL SULFATE 2.5; .5 MG/3ML; MG/3ML
3 SOLUTION RESPIRATORY (INHALATION)
Status: DISCONTINUED | OUTPATIENT
Start: 2018-05-07 | End: 2018-05-11 | Stop reason: HOSPADM

## 2018-05-07 RX ORDER — LEVOTHYROXINE SODIUM 88 UG/1
88 TABLET ORAL
Status: DISCONTINUED | OUTPATIENT
Start: 2018-05-07 | End: 2018-05-11 | Stop reason: HOSPADM

## 2018-05-07 RX ADMIN — IPRATROPIUM BROMIDE AND ALBUTEROL SULFATE 3 ML: .5; 3 SOLUTION RESPIRATORY (INHALATION) at 08:31

## 2018-05-07 RX ADMIN — MEROPENEM 500 MG: 500 INJECTION, POWDER, FOR SOLUTION INTRAVENOUS at 11:15

## 2018-05-07 RX ADMIN — CARVEDILOL 6.25 MG: 6.25 TABLET, FILM COATED ORAL at 17:30

## 2018-05-07 RX ADMIN — FUROSEMIDE 20 MG: 10 INJECTION, SOLUTION INTRAMUSCULAR; INTRAVENOUS at 11:13

## 2018-05-07 RX ADMIN — LEVOTHYROXINE SODIUM 88 MCG: 88 TABLET ORAL at 16:05

## 2018-05-07 RX ADMIN — INSULIN LISPRO 5 UNITS: 100 INJECTION, SOLUTION INTRAVENOUS; SUBCUTANEOUS at 19:54

## 2018-05-07 RX ADMIN — ASPIRIN 81 MG 81 MG: 81 TABLET ORAL at 11:09

## 2018-05-07 RX ADMIN — IPRATROPIUM BROMIDE AND ALBUTEROL SULFATE 3 ML: .5; 3 SOLUTION RESPIRATORY (INHALATION) at 21:38

## 2018-05-07 RX ADMIN — DIBASIC SODIUM PHOSPHATE, MONOBASIC POTASSIUM PHOSPHATE AND MONOBASIC SODIUM PHOSPHATE 1 TABLET: 852; 155; 130 TABLET ORAL at 17:30

## 2018-05-07 RX ADMIN — CARVEDILOL 6.25 MG: 6.25 TABLET, FILM COATED ORAL at 08:12

## 2018-05-07 RX ADMIN — Medication 10 ML: at 06:15

## 2018-05-07 RX ADMIN — MEROPENEM 500 MG: 500 INJECTION, POWDER, FOR SOLUTION INTRAVENOUS at 02:55

## 2018-05-07 RX ADMIN — ACETYLCYSTEINE 200 MG: 200 SOLUTION ORAL; RESPIRATORY (INHALATION) at 21:38

## 2018-05-07 RX ADMIN — Medication 10 ML: at 23:26

## 2018-05-07 RX ADMIN — MEROPENEM 500 MG: 500 INJECTION, POWDER, FOR SOLUTION INTRAVENOUS at 20:32

## 2018-05-07 RX ADMIN — ACETYLCYSTEINE 200 MG: 200 SOLUTION ORAL; RESPIRATORY (INHALATION) at 12:55

## 2018-05-07 RX ADMIN — Medication 400 MG: at 11:09

## 2018-05-07 RX ADMIN — ACETYLCYSTEINE 200 MG: 200 SOLUTION ORAL; RESPIRATORY (INHALATION) at 08:31

## 2018-05-07 RX ADMIN — Medication 10 ML: at 06:14

## 2018-05-07 RX ADMIN — Medication 10 ML: at 11:16

## 2018-05-07 RX ADMIN — DEXTROSE MONOHYDRATE AND POTASSIUM CHLORIDE: 5; .149 INJECTION, SOLUTION INTRAVENOUS at 11:17

## 2018-05-07 RX ADMIN — Medication 10 ML: at 23:25

## 2018-05-07 RX ADMIN — INSULIN GLARGINE 10 UNITS: 100 INJECTION, SOLUTION SUBCUTANEOUS at 13:19

## 2018-05-07 RX ADMIN — LISINOPRIL 2.5 MG: 5 TABLET ORAL at 11:14

## 2018-05-07 RX ADMIN — THERA TABS 1 TABLET: TAB at 11:09

## 2018-05-07 RX ADMIN — BUDESONIDE 500 MCG: 0.5 INHALANT RESPIRATORY (INHALATION) at 08:31

## 2018-05-07 RX ADMIN — IPRATROPIUM BROMIDE AND ALBUTEROL SULFATE 3 ML: .5; 3 SOLUTION RESPIRATORY (INHALATION) at 12:53

## 2018-05-07 RX ADMIN — Medication 1 CAPSULE: at 11:09

## 2018-05-07 RX ADMIN — INSULIN LISPRO 2 UNITS: 100 INJECTION, SOLUTION INTRAVENOUS; SUBCUTANEOUS at 23:25

## 2018-05-07 RX ADMIN — Medication 10 ML: at 16:06

## 2018-05-07 RX ADMIN — Medication 20 ML: at 11:16

## 2018-05-07 RX ADMIN — EPOETIN ALFA 10000 UNITS: 10000 SOLUTION INTRAVENOUS; SUBCUTANEOUS at 16:05

## 2018-05-07 RX ADMIN — Medication 10 ML: at 17:30

## 2018-05-07 RX ADMIN — BUDESONIDE 500 MCG: 0.5 INHALANT RESPIRATORY (INHALATION) at 21:38

## 2018-05-07 RX ADMIN — SPIRONOLACTONE 25 MG: 25 TABLET, FILM COATED ORAL at 11:09

## 2018-05-07 RX ADMIN — ANASTROZOLE 1 MG: 1 TABLET, COATED ORAL at 11:09

## 2018-05-07 RX ADMIN — DIBASIC SODIUM PHOSPHATE, MONOBASIC POTASSIUM PHOSPHATE AND MONOBASIC SODIUM PHOSPHATE 1 TABLET: 852; 155; 130 TABLET ORAL at 11:15

## 2018-05-07 NOTE — PROGRESS NOTES
General Surgery Daily Progress Note    Admit Date: 2018  Post-Operative Day: 11 Days Post-Op from * No procedures listed *     Subjective:     Last 24 hrs: Pt is feeling better - NG has been clamped since yesterday afternoon. No n/v.   +flatus  Objective:     Blood pressure 108/59, pulse 86, temperature 98.2 °F (36.8 °C), resp. rate 16, height 5' 1\" (1.549 m), weight 138 lb 8 oz (62.8 kg), SpO2 97 %. Temp (24hrs), Av.3 °F (36.8 °C), Min:97.4 °F (36.3 °C), Max:98.8 °F (37.1 °C)      _____________________  Physical Exam:     Alert and Oriented, x3, in no acute distress. Cardiovascular: RRR, no peripheral edema  Lungs:CTAB   Abdomen: soft, +BS      Assessment:   Principal Problem:    SOB (shortness of breath) (2018)    Active Problems:    Altered mental status, unspecified (2018)      Acute systolic heart failure ([address] (2018)            Plan:     KUB today  Likely d/c NG if KUB is ok  OOB   Further plans per specialists following pt    Data Review:    Recent Labs      18   0355  18   0334   WBC  9.7  10.2  11.6*   HGB  9.0*  9.2*  9.8*   HCT  28.5*  29.1*  31.0*   PLT  216  215  223     Recent Labs      18   0355  18   0334   NA  135*  138  137   K  3.7  3.9  3.9   CL  97  99  100   CO2  29  30  29   GLU  97  103*  201*   BUN  21*  22*  23*   CREA  0.81  0.85  0.87   CA  8.2*  8.4*  8.9   PHOS  3.2   --   3.3   ALB  1.7*  1.7*  1.9*   SGOT  37  36  43*   ALT  20  23  28     No results for input(s): AML, LPSE in the last 72 hours.         ______________________  Medications:    Current Facility-Administered Medications   Medication Dose Route Frequency    insulin glargine (LANTUS) injection 10 Units  10 Units SubCUTAneous DAILY    furosemide (LASIX) injection 20 mg  20 mg IntraVENous DAILY    lisinopril (PRINIVIL, ZESTRIL) tablet 2.5 mg  2.5 mg Oral DAILY    albuterol-ipratropium (DUO-NEB) 2.5 MG-0.5 MG/3 ML  3 mL Nebulization QID RT  dextrose 5% with KCl 20 mEq/L infusion   IntraVENous CONTINUOUS    levoFLOXacin (LEVAQUIN) 750 mg in D5W IVPB  750 mg IntraVENous Q48H    phosphorus (K PHOS NEUTRAL) 250 mg tablet 1 Tab  1 Tab Oral BID    [START ON 5/8/2018] levothyroxine (SYNTHROID) injection 44 mcg  44 mcg IntraVENous Q24H    acetylcysteine (MUCOMYST) 200 mg/mL (20 %) solution 200 mg  200 mg Nebulization TID RT    lactobac ac& pc-s.therm-b.anim (BERYL Q/RISAQUAD)  1 Cap Oral DAILY    naloxone (NARCAN) injection 0.4 mg  0.4 mg IntraVENous Multiple    flumazenil (ROMAZICON) 0.1 mg/mL injection 0.2 mg  0.2 mg IntraVENous Multiple    midazolam (VERSED) injection 0.25-5 mg  0.25-5 mg IntraVENous Multiple    carvedilol (COREG) tablet 6.25 mg  6.25 mg Oral BID WITH MEALS    meropenem (MERREM) 500 mg in 0.9% sodium chloride (MBP/ADV) 50 mL  0.5 g IntraVENous Q8H    aspirin chewable tablet 81 mg  81 mg Oral DAILY    spironolactone (ALDACTONE) tablet 25 mg  25 mg Oral DAILY    acetaminophen (TYLENOL) suppository 650 mg  650 mg Rectal Q4H PRN    sodium chloride (NS) flush 5-10 mL  5-10 mL IntraVENous Q8H    sodium chloride (NS) flush 5-10 mL  5-10 mL IntraVENous PRN    budesonide (PULMICORT) 500 mcg/2 ml nebulizer suspension  500 mcg Nebulization BID RT    hydrALAZINE (APRESOLINE) 20 mg/mL injection 10 mg  10 mg IntraVENous Q6H PRN    0.9% sodium chloride infusion 250 mL  250 mL IntraVENous PRN    sodium chloride (NS) flush 20 mL  20 mL InterCATHeter PRN    sodium chloride (NS) flush 10 mL  10 mL InterCATHeter Q24H    sodium chloride (NS) flush 10 mL  10 mL InterCATHeter PRN    sodium chloride (NS) flush 10 mL  10 mL InterCATHeter Q8H    alteplase (CATHFLO) 1 mg in sterile water (preservative free) 1 mL injection  1 mg InterCATHeter PRN    bacitracin 500 unit/gram packet 1 Packet  1 Packet Topical PRN    sodium chloride (NS) flush 10-30 mL  10-30 mL InterCATHeter PRN    sodium chloride (NS) flush 10 mL  10 mL InterCATHeter Q24H  sodium chloride (NS) flush 10 mL  10 mL InterCATHeter PRN    sodium chloride (NS) flush 10-40 mL  10-40 mL InterCATHeter Q8H    sodium chloride (NS) flush 20 mL  20 mL InterCATHeter Q24H    anastrozole (ARIMIDEX) tablet 1 mg  1 mg Oral DAILY    epoetin celio (EPOGEN;PROCRIT) injection 10,000 Units  10,000 Units SubCUTAneous Q MON, WED & FRI    albuterol-ipratropium (DUO-NEB) 2.5 MG-0.5 MG/3 ML  3 mL Nebulization Q6H PRN    glucose chewable tablet 16 g  4 Tab Oral PRN    dextrose (D50W) injection syrg 12.5-25 g  12.5-25 g IntraVENous PRN    glucagon (GLUCAGEN) injection 1 mg  1 mg IntraMUSCular PRN    insulin lispro (HUMALOG) injection   SubCUTAneous AC&HS    prochlorperazine (COMPAZINE) with saline injection 5 mg  5 mg IntraVENous Q8H PRN    magnesium oxide (MAG-OX) tablet 400 mg  400 mg Oral DAILY    therapeutic multivitamin (THERAGRAN) tablet 1 Tab  1 Tab Oral DAILY    sodium chloride (NS) flush 5-10 mL  5-10 mL IntraVENous Q8H    sodium chloride (NS) flush 5-10 mL  5-10 mL IntraVENous PRN    ondansetron (ZOFRAN) injection 4 mg  4 mg IntraVENous Q4H PRN       Karon Severs, NP  5/7/2018  I have independently examined the patient and have reviewed the chart. I agree with the above plan. I removed the NGT today, she tolerated the clamping well, she is passing flatus, KUB pending but can start clears today.     Kathi Castro MD

## 2018-05-07 NOTE — PROGRESS NOTES
Pulmonary, Critical Care, and Sleep Medicine~Progress Note    Name: Samm Travis MRN: [id number]   : 1935 Hospital: [address]   Date: 2018 11:37 AM Admission: 2018      Impression Plan   1. Metastatic breast cancer- new dx- left breast mass on MRI with diffuse skeletal mets. Follows with Dr. Arnulfo Torres  2. Acute hypercarbic/hypoxic resp failure-pCO2 ok range  3. Bilateral pleural effusions L>R  4. S/p L thoracentesis   5. H/o mucous plugging requiring bronch x3   6. Abd distention- PSBO--> Ng   7. Altered mental status  8. NSTEMI - ECHO EF 40%; not small pericardial effusion- ? malignnat  9. JEROD on CKD  10. GERD  11. E Coli UTI  12. Malnutrition  13. prior colon ca/ sgy--> ? adhesions   1. Await PF studies- confirm malignant  2. May consider pleuRx drain  3. O2 titration above 90%,    4. Pulmonary toliet- nebs /IS/mucolytic  5. bronchodilators  6. Abx per ID  7. NG per surgery  8. Consider SIDNEY support  9. Am CXR        Poor prognosis     Daily Progression:    : Resting in bed w/out distress, family at bedside. No c/o dyspnea. Mild cough minimally productive. No cp. Afebrile. 96% on RA.       Chart reviewed-  New issues abd distention; L chest opacified-s/p thora  Feels better post thoracentesis  No chest pain  Some cough-- mostly NP  NG feels bad in nose/throat- choking sensation      /3  Feeling better. No acute dyspnea. Alert and answering questions appropriately.  chest film looks better     :   feels better today after bronchoscopy, but still quite coarse. WBC down, creatinine better, cultures from yesterday are pending. MBS was normal.  Noted poor cough reflex. In bed without distress. On NC.      :  Looked comfortable this morning. Noted events overnight.  Chest film with left opacification, received lasix last night     I have reviewed the labs and previous days notes.    ROS      OBJECTIVE:     Vital Signs:       Visit Vitals    /59 (BP 1 Location: Left arm, BP Patient Position: At rest)    Pulse 86    Temp 98.2 °F (36.8 °C)    Resp 16    Ht 5' 1\" (1.549 m)    Wt 62.8 kg (138 lb 8 oz)    SpO2 97%    BMI 26.17 kg/m2      Temp (24hrs), Av.3 °F (36.8 °C), Min:97.4 °F (36.3 °C), Max:98.8 °F (37.1 °C)     Intake/Output:     Last shift:      Last 3 shifts:  1901 -  0700  In: 250 [I.V.:250]  Out: 1025 [Urine:625]          Intake/Output Summary (Last 24 hours) at 18 5320  Last data filed at 18 2306   Gross per 24 hour   Intake              250 ml   Output              325 ml   Net              -75 ml       Physical Exam:                                        Exam Findings Other   General: Chronically ill No resp distress noted, appears stated age    HEENT:  No ulcers, JVD not elevated, no cervical LAD, NG in place    Chest: Decreased BS bases, few rhonci    HEART:  RRR, no murmurs/rubs/gallops    Lungs:  decreased bases R>L    ABD: Soft/NT, non rigid, non-distended  afterNG   EXT: No cyanosis/clubbing/edema, normal peripheral pulses    Skin: No rashes or ulcers, no mottling    Neuro: Alert and answers simple questions         Medications:  Current Facility-Administered Medications   Medication Dose Route Frequency    insulin glargine (LANTUS) injection 10 Units  10 Units SubCUTAneous DAILY    furosemide (LASIX) injection 20 mg  20 mg IntraVENous DAILY    lisinopril (PRINIVIL, ZESTRIL) tablet 2.5 mg  2.5 mg Oral DAILY    albuterol-ipratropium (DUO-NEB) 2.5 MG-0.5 MG/3 ML  3 mL Nebulization QID RT    dextrose 5% with KCl 20 mEq/L infusion   IntraVENous CONTINUOUS    levoFLOXacin (LEVAQUIN) 750 mg in D5W IVPB  750 mg IntraVENous Q48H    phosphorus (K PHOS NEUTRAL) 250 mg tablet 1 Tab  1 Tab Oral BID    [START ON 2018] levothyroxine (SYNTHROID) injection 44 mcg  44 mcg IntraVENous Q24H    acetylcysteine (MUCOMYST) 200 mg/mL (20 %) solution 200 mg  200 mg Nebulization TID RT    lactobac ac& pc-s.therm-b.anim (BERYL Q/RISAQUAD)  1 Cap Oral DAILY    naloxone (NARCAN) injection 0.4 mg  0.4 mg IntraVENous Multiple    flumazenil (ROMAZICON) 0.1 mg/mL injection 0.2 mg  0.2 mg IntraVENous Multiple    midazolam (VERSED) injection 0.25-5 mg  0.25-5 mg IntraVENous Multiple    carvedilol (COREG) tablet 6.25 mg  6.25 mg Oral BID WITH MEALS    meropenem (MERREM) 500 mg in 0.9% sodium chloride (MBP/ADV) 50 mL  0.5 g IntraVENous Q8H    aspirin chewable tablet 81 mg  81 mg Oral DAILY    spironolactone (ALDACTONE) tablet 25 mg  25 mg Oral DAILY    acetaminophen (TYLENOL) suppository 650 mg  650 mg Rectal Q4H PRN    sodium chloride (NS) flush 5-10 mL  5-10 mL IntraVENous Q8H    sodium chloride (NS) flush 5-10 mL  5-10 mL IntraVENous PRN    budesonide (PULMICORT) 500 mcg/2 ml nebulizer suspension  500 mcg Nebulization BID RT    hydrALAZINE (APRESOLINE) 20 mg/mL injection 10 mg  10 mg IntraVENous Q6H PRN    0.9% sodium chloride infusion 250 mL  250 mL IntraVENous PRN    sodium chloride (NS) flush 20 mL  20 mL InterCATHeter PRN    sodium chloride (NS) flush 10 mL  10 mL InterCATHeter Q24H    sodium chloride (NS) flush 10 mL  10 mL InterCATHeter PRN    sodium chloride (NS) flush 10 mL  10 mL InterCATHeter Q8H    alteplase (CATHFLO) 1 mg in sterile water (preservative free) 1 mL injection  1 mg InterCATHeter PRN    bacitracin 500 unit/gram packet 1 Packet  1 Packet Topical PRN    sodium chloride (NS) flush 10-30 mL  10-30 mL InterCATHeter PRN    sodium chloride (NS) flush 10 mL  10 mL InterCATHeter Q24H    sodium chloride (NS) flush 10 mL  10 mL InterCATHeter PRN    sodium chloride (NS) flush 10-40 mL  10-40 mL InterCATHeter Q8H    sodium chloride (NS) flush 20 mL  20 mL InterCATHeter Q24H    anastrozole (ARIMIDEX) tablet 1 mg  1 mg Oral DAILY    epoetin celio (EPOGEN;PROCRIT) injection 10,000 Units  10,000 Units SubCUTAneous Q MON, WED & FRI    albuterol-ipratropium (DUO-NEB) 2.5 MG-0.5 MG/3 ML  3 mL Nebulization Q6H PRN    glucose chewable tablet 16 g  4 Tab Oral PRN    dextrose (D50W) injection syrg 12.5-25 g  12.5-25 g IntraVENous PRN    glucagon (GLUCAGEN) injection 1 mg  1 mg IntraMUSCular PRN    insulin lispro (HUMALOG) injection   SubCUTAneous AC&HS    prochlorperazine (COMPAZINE) with saline injection 5 mg  5 mg IntraVENous Q8H PRN    magnesium oxide (MAG-OX) tablet 400 mg  400 mg Oral DAILY    therapeutic multivitamin (THERAGRAN) tablet 1 Tab  1 Tab Oral DAILY    sodium chloride (NS) flush 5-10 mL  5-10 mL IntraVENous Q8H    sodium chloride (NS) flush 5-10 mL  5-10 mL IntraVENous PRN    ondansetron (ZOFRAN) injection 4 mg  4 mg IntraVENous Q4H PRN       Labs:  ABG Recent Labs      05/05/18   0349   PHI  7.501*   PCO2I  41.5   PO2I  75*   HCO3I  32.4*   SO2I  96   FIO2I  28        CBC Recent Labs      05/07/18   0217  05/06/18   0355  05/05/18   0334   WBC  9.7  10.2  11.6*   HGB  9.0*  9.2*  9.8*   HCT  28.5*  29.1*  31.0*   PLT  216  215  223   MCV  89.9  89.5  92.5   MCH  28.4  28.3  62.0        Metabolic  Panel Recent Labs      05/07/18   0217  05/06/18   0355  05/05/18   0334   NA  135*  138  137   K  3.7  3.9  3.9   CL  97  99  100   CO2  29  30  29   GLU  97  103*  201*   BUN  21*  22*  23*   CREA  0.81  0.85  0.87   CA  8.2*  8.4*  8.9   PHOS  3.2   --   3.3   ALB  1.7*  1.7*  1.9*   SGOT  37  36  43*   ALT  20  23  28        Pertinent Labs                Fox Howard NP  5/7/2018

## 2018-05-07 NOTE — PROGRESS NOTES
2125 Blood glucose 75 via acchucheck  2140 D5 12.5mg given IV via PICC. Dose finished at 2142. Patient is lying in bed. No symptoms verbalized. NPO. Alert and oriented. Son at bedside. Will recheck in 15 min.

## 2018-05-07 NOTE — PROGRESS NOTES
ID Progress Note  2018    Subjective:     She remains weak. Objective:     Antibiotics:  1. Merrem  2. Levaquin  3. Fluconazole      Vitals:   Visit Vitals    /61 (BP 1 Location: Left arm, BP Patient Position: Sitting)    Pulse 79    Temp 98.5 °F (36.9 °C)    Resp 16    Ht 5' 1\" (1.549 m)    Wt 62.8 kg (138 lb 8 oz)    SpO2 94%    BMI 26.17 kg/m2        Tmax:  Temp (24hrs), Av.5 °F (36.9 °C), Min:98.2 °F (36.8 °C), Max:98.8 °F (37.1 °C)      Exam:  Lungs:  rhonchi R anterior  Heart:  regular rate and rhythm  Abdomen:  soft, non-tender. Bowel sounds normal. No masses,  no organomegaly  Skin:  no rash or abnormalities    Labs:      Recent Labs      18   0217  18   0355  18   0334   WBC  9.7  10.2  11.6*   HGB  9.0*  9.2*  9.8*   PLT  216  215  223   BUN  21*  22*  23*   CREA  0.81  0.85  0.87   SGOT  37  36  43*   AP  396*  394*  417*   TBILI  0.6  0.6  0.5       Cultures:     No results found for: ANIYAH  Lab Results   Component Value Date/Time    Culture result: NO GROWTH AFTER 19 HOURS 2018 11:05 AM    Culture result: NO FUNGUS ISOLATED 2 DAYS 2018 11:05 AM    Culture result: LIGHT YEAST (A) 2018 11:30 AM    Culture result: LIGHT NORMAL RESPIRATORY BERYL 2018 11:30 AM    Culture result: HEAVY CANDIDA ALBICANS (A) 2018 11:30 AM    Culture result:  2018 11:30 AM     CULTURE WILL BE HELD FOR 4 WEEKS. IF THERE IS ADDITIONAL FUNGAL GROWTH, A NEW REPORT WILL FOLLOW. Radiology: X ray with increased opacification on the left    Line/Insert Date:           Assessment:     1. Pneumonia  1. Thoracentesis today  2. Metastatic cancer  3. Debility  4. Candida from sputum (uncertain significance)  5. Leukocytosis--WBC up and down--again improved today    Objective:     1. Continue current therapy  2. Follow up pending cultures and studies  3. Aggressive pulmonary toilet  4.  D/W tia Rousseau MD

## 2018-05-07 NOTE — PROGRESS NOTES
CM reviewed chart and noted transfer to [address] [place] [address]. [facility] is following and plans to evaluate pt for admission to inpatient rehab. Pt lives alone, she lives on the 1st floor of a 2-story home, with 4 steps to enter. Pt uses a rollator to assist with ambulation. Pt still with NG Tube in, however it is clamped and they are hoping to be able to pull it today. CM will continue to follow. JEFF Garduno, ANUSHKA    NG Tube has been pulled. [facility] has accepted pt pending insurance authorization. Have submitted to start insurance authorization process today. CM will continue to follow.   JEFF Garduno, ANUSHKA

## 2018-05-07 NOTE — PROGRESS NOTES
Bedside shift change report given to madeline (oncoming nurse) by Zelda Albert (offgoing nurse). Report included the following information SBAR, Kardex and MAR.

## 2018-05-07 NOTE — PROGRESS NOTES
-Hematology / Oncology ([facility]) -  -Primary Oncologist-  Dr Watson Larkin  -CC- breast cancer    -S-  No nausea, abd. Pain, non-productive cough persists, ngt in place    -O-      Patient Vitals for the past 24 hrs:   Temp Pulse Resp BP SpO2   05/07/18 0831 - - - - 97 %   05/07/18 0820 98.2 °F (36.8 °C) 86 16 108/59 96 %   05/07/18 0229 98.6 °F (37 °C) 80 16 116/60 97 %   05/06/18 2050 - - - - 97 %   05/06/18 2026 98.8 °F (37.1 °C) 79 16 95/57 97 %   05/06/18 1437 97.4 °F (36.3 °C) 77 16 99/66 94 %        Gen: nad  NGtube in place now  Chest: bilateral breath sounds   Abd- soft  Cardiac: rrr  Abd: s/nt    -Labs-    Recent Labs      05/07/18   0217  05/06/18   0355  05/05/18   0334   WBC  9.7  10.2  11.6*   HGB  9.0*  9.2*  9.8*   PLT  216  215  223   ANEU  6.9  7.5  8.6*   NA  135*  138  137   K  3.7  3.9  3.9   GLU  97  103*  201*   BUN  21*  22*  23*   CREA  0.81  0.85  0.87   ALT  20  23  28   SGOT  37  36  43*   TBILI  0.6  0.6  0.5   AP  396*  394*  417*   CA  8.2*  8.4*  8.9   PHOS  3.2   --   3.3       -Assessment + Plan-     *) metastatic breast cancer. ER+, CT+, her-2 neg; bone scan: diffuse mets  ----- continue  Arimidex (hormonal AI therapy) started on 4/24  *) Anemia: suspect AOCD +- marrow involvement,  started procrit weekly on 4/23  ---- prbc x1 4/23, 4/26 . Stable at 9 today. *) CHF. Morna Rout Per cardiology. .  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  Required bronchoscopy to clear secretions again 5/1 , had thoracentesis on 5/5, cytology is pending     5/5 - NG tube placed for stomach decompression- for SBO/ileus - large stomach on CT abdomen- per Gen surgery  ngt clamped, given arimidex this am    D/w family    Jeffry Hines MD

## 2018-05-07 NOTE — PROGRESS NOTES
Problem: Mobility Impaired (Adult and Pediatric)  Goal: *Acute Goals and Plan of Care (Insert Text)  Physical Therapy Goals  Revised 5/7/2018  1. Patient will move from supine to sit and sit to supine, scoot up and down and roll side to side in bed with moderate assistance  within 7 day(s). 2.  Patient will transfer from bed to chair and chair to bed with moderate assistance  using the least restrictive device within 7 day(s). 3.  Patient will perform sit to stand with moderate assistance  within 7 day(s). 4.  Patient will ambulate with moderate assistance  for 15 feet with the least restrictive device within 7 day(s). 5.  Patient will tolerate OOB in chair x 30 minutes for improved activity tolerance within 7 days. Physical Therapy Goals  Initiated 4/20/2018  1. Patient will move from supine to sit and sit to supine , scoot up and down and roll side to side in bed with minimal assistance/contact guard assist within 7 day(s). 2.  Patient will transfer from bed to chair and chair to bed with minimal assistance/contact guard assist using the least restrictive device within 7 day(s). 3.  Patient will perform sit to stand with minimal assistance/contact guard assist within 7 day(s). 4.  Patient will ambulate with minimal assistance/contact guard assist for 25 feet with the least restrictive device within 7 day(s). 5.  Patient will ascend/descend 3 stairs with 2 handrail(s) with minimal assistance/contact guard assist within 7 day(s). physical Therapy TREATMENT: WEEKLY REASSESSMENT  Patient: Prakash Rothman (82 y.o. female)  Date: 5/7/2018  Diagnosis: SOB (shortness of breath)  colon  Anemia  COLON SOB (shortness of breath)    11 Days Post-Op  Precautions: Fall, DNR  Chart, physical therapy assessment, plan of care and goals were reviewed. ASSESSMENT:  Pt seen per RN request for return to bed from chair. Pt with caregiver and son present.   Pt c/o L knee pain and unable to tolerate AROM into extension prior to transfer (RLE full ROM noted with min resistance). Pt with hx of RA and son states this is fairly normal for pt (pt reports recent 2* being in bed/etc...). Pt agreeable to pivot back to bed on right with RW and rocking x3 for momentum. Anterior weight shift encouraged and cues not to sit prior to alignment/backing to support surface (pt unable to tolerate increased steps back to bed and uncontrolled descent noted). Pt then agreeable to supine therex following total A boost to [facility]. Personal care aide from home present and receptive to learning LE therex (isometrics focused on 2* pain with ROM). Pt remains appropriate for D/C to rehab once medically stable as she is functioning below her baseline status at this time and requires maximal assistance for mobilization. Patient's progression toward goals since last assessment: fair-limited by RA pain     PLAN:  Goals have been updated based on progression since last assessment. Patient continues to benefit from skilled intervention to address the above impairments. Continue to follow the patient 5 times a week to address goals. Planned Interventions:  [x]              Bed Mobility Training             []       Neuromuscular Re-Education  [x]              Transfer Training                   []       Orthotic/Prosthetic Training  [x]              Gait Training                         []       Modalities  [x]              Therapeutic Exercises           []       Edema Management/Control  [x]              Therapeutic Activities            [x]       Patient and Family Training/Education  []              Other (comment):  Discharge Recommendations: Rehab  Further Equipment Recommendations for Discharge: NA     SUBJECTIVE:   Patient stated From being in the bed. ..   Referring to L knee pain. ..     OBJECTIVE DATA SUMMARY:   Critical Behavior:  Neurologic State: Alert  Orientation Level: Oriented X4  Cognition: Appropriate for age attention/concentration, Appropriate safety awareness, Follows commands  Safety/Judgement: Awareness of environment, Fall prevention, Good awareness of safety precautions, Home safety  Functional Mobility Training:  Bed Mobility:  Rolling:  (NT)  Supine to Sit:  (NT; in chair upon arrival)  Sit to Supine: Moderate assistance; Additional time;Assist x2  Scooting: Total assistance;Assist x2 (supine boost to [facility])  Transfers:  Sit to Stand: Maximum assistance; Additional time;Assist x1 (rock x3 for momentum; cues for push from chair rails/ant weight shift)  Stand to Sit: Moderate assistance; Additional time (safety concerns with poor backing to support surface prior to descent)  Bed to Chair:  (NT)  Balance:  Sitting: Impaired; Without support  Sitting - Static: Good (unsupported)  Sitting - Dynamic: Fair (occasional)  Standing: Impaired; With support  Standing - Static: Fair  Standing - Dynamic : Poor  Ambulation/Gait Training:  Distance (ft): 2 Feet (ft)  Assistive Device: Gait belt;Walker, rolling  Ambulation - Level of Assistance: Moderate assistance; Additional time (brief steps to chair on right)  Gait Abnormalities: Decreased step clearance; Antalgic; Shuffling gait  Base of Support: Widened;Center of gravity altered  Stance: Left decreased  Speed/Haylee: Shuffled  Step Length: Right shortened;Left shortened  Therapeutic Exercises:   Supine: APs, quad sets, glut sets, hip abd x10 reps BLEs  Pain:   No VAS, though +L knee pain (medial) with activity and palpation                 Activity Tolerance:   NAD  After treatment:   []  Patient left in no apparent distress sitting up in chair  [x]  Patient left in no apparent distress in bed  [x]  Call bell left within reach  [x]  Nursing notified  [x]  Caregiver present  []  Bed alarm activated    COMMUNICATION/COLLABORATION:   The patients plan of care was discussed with: Registered Nurse    Jackeline Madsen   Time Calculation: 16 mins

## 2018-05-07 NOTE — PROGRESS NOTES
Hospitalist Progress Note  Estrada Weaver MD  Answering service: [phone] from in house phone        Date of Service:  2018  NAME:  Richelle Shore  :  1935  MRN:  [id number]      Admission Summary:   The patient is an 46-year-old female with history of diabetes and rheumatoid arthritis who initially presented to the emergency room via EMS with complaints of shortness of breath    Interval history / Subjective:     F/u for acute on chronic hypoxic respiratory failure       Assessment & Plan:     Acute on chronic hypoxic/hypercarbic respiratory failure due to pulmonary edema, pneumonia  -s/p bronchoscopy on  and on  suctioned and cleared  -respiratory culture grow on  and  grow candida albicans, on diflucan which is stopped today  -,resp distress and CXR worsening worsening of near complete left hemithorax opacification.  -On and off BIPAP  -S/p bronch and clearing of secretion on   -CXR shows Increased bilateral pleural effusions and lung opacities resulting in near  complete opacification of the left hemithorax. - S/p thoracentesis  - Consider pleurx drain   - Oxygen saturation is stable    Acute encephalopathy possible due to metabolic. Resolved  -CT head on  volume loss and minimal white matter disease. No acute intracranial [place], Sinus mucosal inflammatory change  -MRI of brain  Study limited by motion artifact, volume loss and white matter disease. No definite intracranial metastasis however no intravenous contrast was administered due to poor renal function. 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   - continue supportive care    Sepsis secondary to E Coli UTI, pneumonia  POA  -urine cx   grow e coli, pseudomanas sp  -blood cx on ,  and  no growth  Antinfectives  -Ceftriaxone 1 dose on   -Diflucan -present  -Levaquin -present  -Meropenem 4/24-present  -Zosyn 4/19-4/24  -Vancomycin 4/19-4/28  - Antibiotics management as per Infectious disease. NSTEMI  - on aspirin,coreg. Statin intolerant,she is on fish oil. She was getting Evolocumab injections out patient.  -cardiologist on board, possible cardiac cath next week    Acute systolic CHF NYHA Class IV  -Intermittent diuresis,on hydralazine, spironolactone,coreg   -cardiology following    JEROD on CKD stage III,creatinien improved  -creatinine improved and stabilized. Increase Lasix to 40 mg daily  -nephrologist on board    Metastatic left breast cancer with diffuse bone mets.+er,+Pr  -on armidex   -Hem/Onc on board    HTN  -BP stablecon hydralazine, coreg and spironolactone,  monitor BP. Added low dose lisinopril 5/3    DM-II  -Blood glucose 170-130s. She off the oral medications(Glimepiride,sitagliptin and metformin) she was taking PTA  -Add low dose Lantus 8 units sc daily,continue insulin sliding scale, monitor finger stick glucose    Hypothyroidism  -continue synthroid    Hx of GERD  -continue pepcid    Elevated liver enzyme   -possible related to liver lesion, monitor LFT    Anemia multifactorial  -Hgb 6.7, s/p 2 units pRBC on 4/26,   -Hgb 9.3, monitor H/H    Hx of colon cancer 25 years ago  -according to son, there is a work-up planned with [facility] regarding the possibility of recurrent CA (based on lesions seen in the calvarium on MRI - 3/19). -hem/onc on board    H/o stroke with residual weakness on the left    Diet,suspect silent aspiration. NPO as Ct showed SBO. Surgery consulted. --    Hypomagnesemia: iv mg today,on oral magnesium. H/O Seropositive RA of multiple sites,stable. -Apparently she was getting Etanercept sc injections out patient. Debility: PT/OT requested. She likely needs rehab on discharge        Code status: DNR  DVT prophylaxis:SCD    Care Plan discussed with: Patient/Family and Nurse  Disposition:in ICU    5/1,updated son at bedside and questions answered  Contact son, Connor Hayden [phone]. Hospital Problems  Date Reviewed: 4/16/2018          Codes Class Noted POA    Acute systolic heart failure ([facility] [address] ICD-10-CM: I50.21  ICD-9-CM: 428.21  4/24/2018 Unknown        Altered mental status, unspecified ICD-10-CM: R41.82  ICD-9-CM: 780.97  4/23/2018 Unknown        * (Principal)SOB (shortness of breath) ICD-10-CM: R06.02  ICD-9-CM: 786.05  4/16/2018 Unknown                Vital Signs:    Last 24hrs VS reviewed since prior progress note. Most recent are:  Visit Vitals    /64    Pulse 82    Temp 98.5 °F (36.9 °C)    Resp 16    Ht 5' 1\" (1.549 m)    Wt 62.8 kg (138 lb 8 oz)    SpO2 94%    BMI 26.17 kg/m2         Intake/Output Summary (Last 24 hours) at 05/07/18 1814  Last data filed at 05/07/18 1331   Gross per 24 hour   Intake              250 ml   Output              450 ml   Net             -200 ml        Physical Examination:             Constitutional:  Alert and conversant,on nasal canula   ENT:  Oral mucous moist, oropharynx benign. Neck supple,    Resp:  Decrease bronchial breath sound, few wheezing and rhonic bilaterally. No accessory muscle use   CV:  Regular rhythm, normal rate, no murmurs, gallops, rubs    GI:  Soft, non distended, non tender. normoactive bowel sounds, no hepatosplenomegaly     Musculoskeletal:  No edema    Neurologic:  Conscious and alert, oriented to place and person, answer questions, moves all extremities. Left side weak     Skin:  Good turgor, no rashes or ulcers       Data Review:    Review and/or order of clinical lab test      Labs:     Recent Labs      05/07/18   0217  05/06/18   0355   WBC  9.7  10.2   HGB  9.0*  9.2*   HCT  28.5*  29.1*   PLT  216  215     Recent Labs      05/07/18   0217  05/06/18   0355  05/05/18   0334   NA  135*  138  137   K  3.7  3.9  3.9   CL  97  99  100   CO2  29  30  29   BUN  21*  22*  23*   CREA  0.81  0.85  0.87   GLU  97  103*  201*   CA  8.2*  8.4*  8.9   PHOS  3.2   --   3.3     Recent Labs 05/07/18   0217  05/06/18   0355  05/05/18   0334   SGOT  37  36  43*   ALT  20  23  28   AP  396*  394*  417*   TBILI  0.6  0.6  0.5   TP  6.1*  6.0*  6.5   ALB  1.7*  1.7*  1.9*   GLOB  4.4*  4.3*  4.6*     No results for input(s): INR, PTP, APTT in the last 72 hours. No lab exists for component: INREXT, INREXT   No results for input(s): FE, TIBC, PSAT, FERR in the last 72 hours. No results found for: FOL, RBCF   No results for input(s): PH, PCO2, PO2 in the last 72 hours. No results for input(s): CPK, CKNDX, TROIQ in the last 72 hours.     No lab exists for component: CPKMB  Lab Results   Component Value Date/Time    Cholesterol, total 74 04/16/2018 04:21 AM    HDL Cholesterol 25 04/16/2018 04:21 AM    LDL, calculated 31.6 04/16/2018 04:21 AM    Triglyceride 87 04/16/2018 04:21 AM    CHOL/HDL Ratio 3.0 04/16/2018 04:21 AM     Lab Results   Component Value Date/Time    Glucose (POC) 261 (H) 05/07/2018 05:54 PM    Glucose (POC) 116 (H) 05/07/2018 12:02 PM    Glucose (POC) 101 (H) 05/07/2018 06:43 AM    Glucose (POC) 119 (H) 05/06/2018 11:15 PM    Glucose (POC) 179 (H) 05/06/2018 09:56 PM     Lab Results   Component Value Date/Time    Color YELLOW/STRAW 04/20/2018 04:30 AM    Appearance TURBID (A) 04/20/2018 04:30 AM    Specific gravity 1.029 04/20/2018 04:30 AM    pH (UA) 5.0 04/20/2018 04:30 AM    Protein 100 (A) 04/20/2018 04:30 AM    Glucose NEGATIVE  04/20/2018 04:30 AM    Ketone NEGATIVE  04/20/2018 04:30 AM    Bilirubin NEGATIVE  04/20/2018 04:30 AM    Urobilinogen 0.2 04/20/2018 04:30 AM    Nitrites NEGATIVE  04/20/2018 04:30 AM    Leukocyte Esterase LARGE (A) 04/20/2018 04:30 AM    Epithelial cells FEW 04/20/2018 04:30 AM    Bacteria 3+ (A) 04/20/2018 04:30 AM    WBC >100 (H) 04/20/2018 04:30 AM    RBC 5-10 04/20/2018 04:30 AM         Medications Reviewed:     Current Facility-Administered Medications   Medication Dose Route Frequency    levothyroxine (SYNTHROID) tablet 88 mcg  88 mcg Oral 6am    albuterol-ipratropium (DUO-NEB) 2.5 MG-0.5 MG/3 ML  3 mL Nebulization TID RT    insulin glargine (LANTUS) injection 10 Units  10 Units SubCUTAneous DAILY    furosemide (LASIX) injection 20 mg  20 mg IntraVENous DAILY    lisinopril (PRINIVIL, ZESTRIL) tablet 2.5 mg  2.5 mg Oral DAILY    dextrose 5% with KCl 20 mEq/L infusion   IntraVENous CONTINUOUS    levoFLOXacin (LEVAQUIN) 750 mg in D5W IVPB  750 mg IntraVENous Q48H    phosphorus (K PHOS NEUTRAL) 250 mg tablet 1 Tab  1 Tab Oral BID    acetylcysteine (MUCOMYST) 200 mg/mL (20 %) solution 200 mg  200 mg Nebulization TID RT    lactobac ac& pc-s.therm-b.anim (BERYL Q/RISAQUAD)  1 Cap Oral DAILY    naloxone (NARCAN) injection 0.4 mg  0.4 mg IntraVENous Multiple    flumazenil (ROMAZICON) 0.1 mg/mL injection 0.2 mg  0.2 mg IntraVENous Multiple    midazolam (VERSED) injection 0.25-5 mg  0.25-5 mg IntraVENous Multiple    carvedilol (COREG) tablet 6.25 mg  6.25 mg Oral BID WITH MEALS    meropenem (MERREM) 500 mg in 0.9% sodium chloride (MBP/ADV) 50 mL  0.5 g IntraVENous Q8H    aspirin chewable tablet 81 mg  81 mg Oral DAILY    spironolactone (ALDACTONE) tablet 25 mg  25 mg Oral DAILY    acetaminophen (TYLENOL) suppository 650 mg  650 mg Rectal Q4H PRN    sodium chloride (NS) flush 5-10 mL  5-10 mL IntraVENous Q8H    sodium chloride (NS) flush 5-10 mL  5-10 mL IntraVENous PRN    budesonide (PULMICORT) 500 mcg/2 ml nebulizer suspension  500 mcg Nebulization BID RT    hydrALAZINE (APRESOLINE) 20 mg/mL injection 10 mg  10 mg IntraVENous Q6H PRN    0.9% sodium chloride infusion 250 mL  250 mL IntraVENous PRN    sodium chloride (NS) flush 20 mL  20 mL InterCATHeter PRN    sodium chloride (NS) flush 10 mL  10 mL InterCATHeter Q24H    sodium chloride (NS) flush 10 mL  10 mL InterCATHeter PRN    sodium chloride (NS) flush 10 mL  10 mL InterCATHeter Q8H    alteplase (CATHFLO) 1 mg in sterile water (preservative free) 1 mL injection  1 mg InterCATHeter PRN    bacitracin 500 unit/gram packet 1 Packet  1 Packet Topical PRN    sodium chloride (NS) flush 10-30 mL  10-30 mL InterCATHeter PRN    sodium chloride (NS) flush 10 mL  10 mL InterCATHeter Q24H    sodium chloride (NS) flush 10 mL  10 mL InterCATHeter PRN    sodium chloride (NS) flush 10-40 mL  10-40 mL InterCATHeter Q8H    sodium chloride (NS) flush 20 mL  20 mL InterCATHeter Q24H    anastrozole (ARIMIDEX) tablet 1 mg  1 mg Oral DAILY    epoetin celio (EPOGEN;PROCRIT) injection 10,000 Units  10,000 Units SubCUTAneous Q MON, WED & FRI    albuterol-ipratropium (DUO-NEB) 2.5 MG-0.5 MG/3 ML  3 mL Nebulization Q6H PRN    glucose chewable tablet 16 g  4 Tab Oral PRN    dextrose (D50W) injection syrg 12.5-25 g  12.5-25 g IntraVENous PRN    glucagon (GLUCAGEN) injection 1 mg  1 mg IntraMUSCular PRN    insulin lispro (HUMALOG) injection   SubCUTAneous AC&HS    prochlorperazine (COMPAZINE) with saline injection 5 mg  5 mg IntraVENous Q8H PRN    magnesium oxide (MAG-OX) tablet 400 mg  400 mg Oral DAILY    therapeutic multivitamin (THERAGRAN) tablet 1 Tab  1 Tab Oral DAILY    sodium chloride (NS) flush 5-10 mL  5-10 mL IntraVENous Q8H    sodium chloride (NS) flush 5-10 mL  5-10 mL IntraVENous PRN    ondansetron (ZOFRAN) injection 4 mg  4 mg IntraVENous Q4H PRN     ______________________________________________________________________  EXPECTED LENGTH OF STAY: 4d 21h  ACTUAL LENGTH OF STAY:          21                 Minesh Cruz MD

## 2018-05-07 NOTE — PROGRESS NOTES
ADULT PROTOCOL: JET AEROSOL ASSESSMENT    Patient  Kenrick Yancey     82 y.o.   female     5/7/2018  2:35 AM    Breath Sounds Pre Procedure: Right Breath Sounds: Diminished                               Left Breath Sounds: Diminished    Breath Sounds Post Procedure: Right Breath Sounds: Diminished                                 Left Breath Sounds: Diminished    Breathing pattern: Pre procedure Breathing Pattern: Regular          Post procedure Breathing Pattern: Regular    Heart Rate: Pre procedure Pulse: 98           Post procedure Pulse: 99    Resp Rate: Pre procedure Respirations: 16    Oxygen: O2 Device: Nasal cannula   2L     Changed: NO    SpO2: Pre procedure SpO2: 97 %  With               Post procedure SpO2: 99 %  With     Nebulizer Therapy: Current medications Aerosolized Medications: DuoNeb, Mucomyst      Changed: YES to TID     Problem List:   Patient Active Problem List   Diagnosis Code    [facility] mellitus) [facility] E11.9    Hypothyroid E03.9    Colon cancer ([facility] C18.9    H/O: CVA     Microalbuminuria R80.9    Age-related osteoporosis without current pathological fracture M81.0    Essential hypertension I10    Anemia D64.9    Hyperlipidemia E78.5    Carotid bruit R09.89    Claudication ([facility]) I73.9    Weakness of left arm R29.898    PAD (peripheral artery disease) ([facility]) I73.9    Weakness due to cerebrovascular accident [id number]    Neuropathy in diabetes ([facility] E11.40    Occlusion and stenosis of carotid artery without mention of cerebral infarction I65.29    Seropositive rheumatoid arthritis of multiple sites ([facility]) M05.79    Primary osteoarthritis of both knees M17.0    Long-term use of immunosuppressant medication Z79.899    Statin intolerance Z78.9    Asymptomatic hyperuricemia E79.0    Vitamin D deficiency E55.9    CKD (chronic kidney disease) stage 3, GFR 30-59 ml/min N18.3    SOB (shortness of breath) R06.02    Altered mental status, unspecified Y55.71    Acute systolic heart failure [facility]) I50.21       Respiratory Therapist: Xiao Hyman, RT

## 2018-05-07 NOTE — PROGRESS NOTES
Cardiology Progress Note            Admit Date: 4/16/2018  Admit Diagnosis: SOB (shortness of breath)  colon  Anemia  COLON  Date: 5/7/2018     Time: 2:01 PM    Subjective:   States she is hungry. No belching, no N&V, passing flatus. States breathing has improved. Denies chest pain. States she feels cold. Assessment and Plan     1. NSTEMI:     -No chest pain. -Repeat TTE:4/27/18  EF unchanged 45% with mild hypokinesis  basal-mid anteroseptal and apical walls. -ASA. Continue off Plavix   -Continue coreg 6.25 mg BID   -Statin intolerant due to weakness on zocor, on repatha, last LDL 31, so will not rechallenge statin. On fish-oil.   -Possible LHC tomorrow afternoon if pt able to take PO reliably. 2. Cardiomyopathy/Acute HFrEF, new:  EF 45% NYHA IV     -Lisinopril 2.5 mg po daily (watch creatinine). -Coreg to 6.25 mg BID today.   -Continue aldactone.    -On lasix 20 mg IV daily   -Daily weights, I/Os: net -775/24 hours    3. SBO vs ileus:  General surgery following. NG tube clamped- hopefully remove today. 4. JEROD on CKD:   Creatinine normalized. GFR >60     5. Acute hypoxic respiratory failure:multifactorial.  On room air today- sats 98%   -difficulty managing secretions   -Spironolactone   -Lasix 20 mg daily IV   -Seems improved s/p thoracentesis. 6. Anemia:hgb stable 9.5 On procrit. 7. HTN- BP low-low NL.   -Coreg to 6.25 mg BID   -Low dose ACE-I 2.5 mg lisinopril     8. Leukocytosis/Ecoli UTI/PNA:  ID following WBC trending down. 9. Hx of Colon cancer, now metastatic breast ca (bone, liver,calvarium mets) on Arimedex  -Hematology oncology following. On Arimesex. 10. Advanced directives:  No shock or CPR, DNR Palliative care following. 11. Deconditioned:  Very weak.  Will need PT and rehab-     Per Dr. Rigo Lewis, Considering [facility] tomorrow afternoon if pt able to take po and pulmonary status ok.   .  Emaline Book. MARIETTA [place] 0900. Cardiology Attending:Patient seen and examined. I agree with NP assessment and plans. Need to see if able to take po after NG tube out. Sherie Álvarez MD 5/7/2018 11:26 AM             Objective:      Physical Exam:                Visit Vitals    /60 (BP 1 Location: Left arm, BP Patient Position: At rest)    Pulse 80    Temp 98.6 °F (37 °C)    Resp 16    Ht 5' 1\" (1.549 m)    Wt 62.8 kg (138 lb 8 oz)    SpO2 97%    BMI 26.17 kg/m2          General Appearance:   elderly female, NAD, on RA   Ears/Nose/Mouth/Throat:    Hearing grossly normal.         Neck:  Supple. Chest:     Course breath sounds bilaterally. Mildly Decreased breath sounds on left. Cardiovascular:   Regular rate and rhythm, S1, S2 normal, no murmur   Abdomen:    Soft, nontender, no gaurding. Extremities:  No edema bilaterally. Skin:  Warm and dry.      Telemetry: Sinus rhythm-       Data Review:    Labs:    Recent Results (from the past 24 hour(s))   GLUCOSE, POC    Collection Time: 05/06/18 12:09 PM   Result Value Ref Range    Glucose (POC) 104 (H) 65 - 100 mg/dL    Performed by Nelida Dawkins, POC    Collection Time: 05/06/18  4:21 PM   Result Value Ref Range    Glucose (POC) 109 (H) 65 - 100 mg/dL    Performed by Suzy Lacey    GLUCOSE, POC    Collection Time: 05/06/18  9:25 PM   Result Value Ref Range    Glucose (POC) 75 65 - 100 mg/dL    Performed by [address] [place] [address], POC    Collection Time: 05/06/18  9:56 PM   Result Value Ref Range    Glucose (POC) 179 (H) 65 - 100 mg/dL    Performed by [address] [place] [address], POC    Collection Time: 05/06/18 11:15 PM   Result Value Ref Range    Glucose (POC) 119 (H) 65 - 100 mg/dL    Performed by [address] [place] [address], COMPREHENSIVE    Collection Time: 05/07/18  2:17 AM   Result Value Ref Range    Sodium 135 (L) 136 - 145 mmol/L    Potassium 3.7 3.5 - 5.1 mmol/L    Chloride 97 97 - 108 mmol/L    CO2 29 21 - 32 mmol/L    Anion gap 9 5 - 15 mmol/L    Glucose 97 65 - 100 mg/dL    BUN 21 (H) 6 - 20 MG/DL    Creatinine 0.81 0.55 - 1.02 MG/DL    BUN/Creatinine ratio 26 (H) 12 - 20      GFR est AA >60 >60 ml/min/1.73m2    GFR est non-AA >60 >60 ml/min/1.73m2    Calcium 8.2 (L) 8.5 - 10.1 MG/DL    Bilirubin, total 0.6 0.2 - 1.0 MG/DL    ALT (SGPT) 20 12 - 78 U/L    AST (SGOT) 37 15 - 37 U/L    Alk. phosphatase 396 (H) 45 - 117 U/L    Protein, total 6.1 (L) 6.4 - 8.2 g/dL    Albumin 1.7 (L) 3.5 - 5.0 g/dL    Globulin 4.4 (H) 2.0 - 4.0 g/dL    A-G Ratio 0.4 (L) 1.1 - 2.2     CBC WITH AUTOMATED DIFF    Collection Time: 05/07/18  2:17 AM   Result Value Ref Range    WBC 9.7 3.6 - 11.0 K/uL    RBC 3.17 (L) 3.80 - 5.20 M/uL    HGB 9.0 (L) 11.5 - 16.0 g/dL    HCT 28.5 (L) 35.0 - 47.0 %    MCV 89.9 80.0 - 99.0 FL    MCH 28.4 26.0 - 34.0 PG    MCHC 31.6 30.0 - 36.5 g/dL    RDW 21.4 (H) 11.5 - 14.5 %    PLATELET 304 207 - 082 K/uL    MPV 10.5 8.9 - 12.9 FL    NRBC 0.0 0  WBC    ABSOLUTE NRBC 0.00 0.00 - 0.01 K/uL    NEUTROPHILS 72 32 - 75 %    LYMPHOCYTES 13 12 - 49 %    MONOCYTES 11 5 - 13 %    EOSINOPHILS 2 0 - 7 %    BASOPHILS 1 0 - 1 %    IMMATURE GRANULOCYTES 1 (H) 0.0 - 0.5 %    ABS. NEUTROPHILS 6.9 1.8 - 8.0 K/UL    ABS. LYMPHOCYTES 1.3 0.8 - 3.5 K/UL    ABS. MONOCYTES 1.1 (H) 0.0 - 1.0 K/UL    ABS. EOSINOPHILS 0.2 0.0 - 0.4 K/UL    ABS. BASOPHILS 0.1 0.0 - 0.1 K/UL    ABS. IMM.  GRANS. 0.1 (H) 0.00 - 0.04 K/UL    DF SMEAR SCANNED      PLATELET COMMENTS Large Platelets      RBC COMMENTS ANISOCYTOSIS  2+        RBC COMMENTS POLYCHROMASIA  1+        RBC COMMENTS OVALOCYTES  PRESENT        RBC COMMENTS TARGET CELLS  PRESENT        WBC COMMENTS HYPERSEGMENTED POLYS     PHOSPHORUS    Collection Time: 05/07/18  2:17 AM   Result Value Ref Range    Phosphorus 3.2 2.6 - 4.7 MG/DL   GLUCOSE, POC    Collection Time: 05/07/18  6:43 AM   Result Value Ref Range    Glucose (POC) 101 (H) 65 - 100 mg/dL    Performed by [facility]:        Current Facility-Administered Medications   Medication Dose Route Frequency    insulin glargine (LANTUS) injection 10 Units  10 Units SubCUTAneous DAILY    furosemide (LASIX) injection 20 mg  20 mg IntraVENous DAILY    lisinopril (PRINIVIL, ZESTRIL) tablet 2.5 mg  2.5 mg Oral DAILY    albuterol-ipratropium (DUO-NEB) 2.5 MG-0.5 MG/3 ML  3 mL Nebulization QID RT    dextrose 5% with KCl 20 mEq/L infusion   IntraVENous CONTINUOUS    levoFLOXacin (LEVAQUIN) 750 mg in D5W IVPB  750 mg IntraVENous Q48H    phosphorus (K PHOS NEUTRAL) 250 mg tablet 1 Tab  1 Tab Oral BID    [START ON 5/8/2018] levothyroxine (SYNTHROID) injection 44 mcg  44 mcg IntraVENous Q24H    acetylcysteine (MUCOMYST) 200 mg/mL (20 %) solution 200 mg  200 mg Nebulization TID RT    lactobac ac& pc-s.therm-b.anim (BERYL Q/RISAQUAD)  1 Cap Oral DAILY    naloxone (NARCAN) injection 0.4 mg  0.4 mg IntraVENous Multiple    flumazenil (ROMAZICON) 0.1 mg/mL injection 0.2 mg  0.2 mg IntraVENous Multiple    midazolam (VERSED) injection 0.25-5 mg  0.25-5 mg IntraVENous Multiple    carvedilol (COREG) tablet 6.25 mg  6.25 mg Oral BID WITH MEALS    meropenem (MERREM) 500 mg in 0.9% sodium chloride (MBP/ADV) 50 mL  0.5 g IntraVENous Q8H    aspirin chewable tablet 81 mg  81 mg Oral DAILY    spironolactone (ALDACTONE) tablet 25 mg  25 mg Oral DAILY    acetaminophen (TYLENOL) suppository 650 mg  650 mg Rectal Q4H PRN    sodium chloride (NS) flush 5-10 mL  5-10 mL IntraVENous Q8H    sodium chloride (NS) flush 5-10 mL  5-10 mL IntraVENous PRN    budesonide (PULMICORT) 500 mcg/2 ml nebulizer suspension  500 mcg Nebulization BID RT    hydrALAZINE (APRESOLINE) 20 mg/mL injection 10 mg  10 mg IntraVENous Q6H PRN    0.9% sodium chloride infusion 250 mL  250 mL IntraVENous PRN    sodium chloride (NS) flush 20 mL  20 mL InterCATHeter PRN    sodium chloride (NS) flush 10 mL  10 mL InterCATHeter Q24H    sodium chloride (NS) flush 10 mL  10 mL InterCATHeter PRN    sodium chloride (NS) flush 10 mL  10 mL InterCATHeter Q8H    alteplase (CATHFLO) 1 mg in sterile water (preservative free) 1 mL injection  1 mg InterCATHeter PRN    bacitracin 500 unit/gram packet 1 Packet  1 Packet Topical PRN    sodium chloride (NS) flush 10-30 mL  10-30 mL InterCATHeter PRN    sodium chloride (NS) flush 10 mL  10 mL InterCATHeter Q24H    sodium chloride (NS) flush 10 mL  10 mL InterCATHeter PRN    sodium chloride (NS) flush 10-40 mL  10-40 mL InterCATHeter Q8H    sodium chloride (NS) flush 20 mL  20 mL InterCATHeter Q24H    anastrozole (ARIMIDEX) tablet 1 mg  1 mg Oral DAILY    epoetin celio (EPOGEN;PROCRIT) injection 10,000 Units  10,000 Units SubCUTAneous Q MON, WED & FRI    albuterol-ipratropium (DUO-NEB) 2.5 MG-0.5 MG/3 ML  3 mL Nebulization Q6H PRN    glucose chewable tablet 16 g  4 Tab Oral PRN    dextrose (D50W) injection syrg 12.5-25 g  12.5-25 g IntraVENous PRN    glucagon (GLUCAGEN) injection 1 mg  1 mg IntraMUSCular PRN    insulin lispro (HUMALOG) injection   SubCUTAneous AC&HS    prochlorperazine (COMPAZINE) with saline injection 5 mg  5 mg IntraVENous Q8H PRN    magnesium oxide (MAG-OX) tablet 400 mg  400 mg Oral DAILY    therapeutic multivitamin (THERAGRAN) tablet 1 Tab  1 Tab Oral DAILY    sodium chloride (NS) flush 5-10 mL  5-10 mL IntraVENous Q8H    sodium chloride (NS) flush 5-10 mL  5-10 mL IntraVENous PRN    ondansetron (ZOFRAN) injection 4 mg  4 mg IntraVENous Q4H PRN          Berenice Clemente.  MARIETTA [place]     [facility] [address] [place] [address], [address] [place] [address] [address]   Aidan [place]   [phone]

## 2018-05-07 NOTE — PROGRESS NOTES
Problem: Self Care Deficits Care Plan (Adult)  Goal: *Acute Goals and Plan of Care (Insert Text)  Occupational Therapy Goals  Initiated 4/20/2018; Reviewed 4/30/18  1. Patient will perform grooming set up assist bedside level within 7 days. 2.  Patient will perform bathing seated with moderate assistance  within 7 day(s). 3.  Patient will perform upper body dressing with minimal assistance/contact guard assist within 7 day(s). 4.  Patient will perform toilet transfers with minimal assistance/contact guard assist within 7 day(s). 5.  Patient will participate in upper extremity therapeutic exercise/activities with supervision/set-up for 10 minutes within 7 day(s). 6.  Patient will utilize energy conservation techniques during functional activities with verbal cues within 7 day(s). Occupational Therapy TREATMENT: WEEKLY REASSESSMENT  Patient: Lyndsay Mead (82 y.o. female)  Date: 5/7/2018  Diagnosis: SOB (shortness of breath)  colon  Anemia  COLON SOB (shortness of breath)    11 Days Post-Op  Precautions: Fall, DNR  Chart, occupational therapy assessment, plan of care, and goals were reviewed. ASSESSMENT:  Patient received supine in bed, requested to transfer to chair. Requires max-A for rolling/ supine to sit for shifting BLE and trunk righting with HOB raised. Once sitting EOB, demonstrates good static and fair dynamic sitting balance. Requires total-A for doffing socks and total-A donning slip on shoes sitting EOB due to LLE pain and impaired functional reach to LB. Performs sit-stand with max-Ax1 and 2 attempts, requires verbal and tactile cues for anterior weightshifting, requires max-Ax1 and constant support for pivoting/ stepping to chair. Once sitting in chair, patient performed BUE therapeutic exercises (shouldler flexion x10 reps, isometric triceps against arm rest with anterior weightshifting x 4 reps), limited by fatigue/ impaired activity tolerance.   Continue to recommend inpatient rehab at discharge to optimize functional outcome. Progression toward goals:  []            Improving appropriately and progressing toward goals  [x]            Improving slowly and progressing toward goals  []            Not making progress toward goals and plan of care will be adjusted     PLAN:  Goals have been updated based on progression since last assessment. Patient continues to benefit from skilled intervention to address the above impairments. Continue to follow patient 5 times a week to address goals. Planned Interventions:  [x]                    Self Care Training                  [x]             Therapeutic Activities  [x]                    Functional Mobility Training    []             Cognitive Retraining  [x]                    Therapeutic Exercises           [x]             Endurance Activities  [x]                    Balance Training                   []             Neuromuscular Re-Education  []                    Visual/Perceptual Training     [x]        Home Safety Training  [x]                    Patient Education                 [x]             Family Training/Education  []                    Other (comment):  Discharge Recommendations: Inpatient Rehab  Further Equipment Recommendations for Discharge:TBD     SUBJECTIVE:   Patient stated I want to get up to the chair.     OBJECTIVE DATA SUMMARY:   Cognitive/Behavioral Status:  Neurologic State: Alert  Orientation Level: Oriented X4  Cognition: Appropriate for age attention/concentration; Appropriate safety awareness; Follows commands  Perception: Appears intact  Perseveration: No perseveration noted  Safety/Judgement: Awareness of environment; Fall prevention;Good awareness of safety precautions; Home safety    Functional Mobility and Transfers for ADLs:  Bed Mobility:  Rolling: Maximum assistance;Assist x1  Supine to Sit: Maximum assistance; Additional time;Assist x1 (with HOB raised)    Transfers:  Sit to Stand: Maximum assistance;Assist x1 (verbal cues for anterior weightshifting)      Bed to Chair: Maximum assistance    Balance:  Sitting: Impaired  Sitting - Static: Good (unsupported)  Sitting - Dynamic: Fair (occasional)  Standing: Impaired  Standing - Static: Fair;Constant support  Standing - Dynamic : Fair    ADL Intervention:          Lower Body Dressing Assistance  Socks: Total assistance (dependent) (for doffing sitting EOB, unable to reach feet)  Slip on Shoes with Back: Total assistance (dependent) (for donning sitting EOB)         Cognitive Retraining  Safety/Judgement: Awareness of environment; Fall prevention;Good awareness of safety precautions; Home safety     Therapeutic Exercises:   Once sitting in chair, patient performed BUE therapeutic exercises (shouldler flexion x10 reps, isometric triceps against arm rests with anterior weightshifting x 4 reps, unable to achieve hip clearance), limited by fatigue/ impaired activity tolerance. Exercises performed to promote UE strengthening for ADLs and mobility tasks.      Pain:  Patient reports LLE pain with activity, resolving with rest/ repositioning    Activity Tolerance:   VSS    After treatment:   [x] Patient left in no apparent distress sitting up in chair  [] Patient left in no apparent distress in bed  [x] Call bell left within reach  [x] Nursing notified  [] Caregiver present  [] Bed alarm activated    COMMUNICATION/COLLABORATION:   The patients plan of care was discussed with: Physical Therapist and Registered Nurse    Jakob Hawley OT  Time Calculation: 24 mins

## 2018-05-07 NOTE — TELEPHONE ENCOUNTER
Patients son Torres Diaz called because he had a question for Dr. Manuel Bueno. The patient has been in the hospital and has not taken Enbrel or Otrexa, does he want her to take it while she is in the hospital. Torres Diaz can be reached at .

## 2018-05-08 ENCOUNTER — APPOINTMENT (OUTPATIENT)
Dept: CARDIAC CATH/INVASIVE PROCEDURES | Age: 83
DRG: 853 | End: 2018-05-08
Attending: HOSPITALIST
Payer: MEDICARE

## 2018-05-08 ENCOUNTER — APPOINTMENT (OUTPATIENT)
Dept: GENERAL RADIOLOGY | Age: 83
DRG: 853 | End: 2018-05-08
Attending: NURSE PRACTITIONER
Payer: MEDICARE

## 2018-05-08 LAB
ALBUMIN SERPL-MCNC: 1.7 G/DL (ref 3.5–5)
ALBUMIN/GLOB SERPL: 0.4 {RATIO} (ref 1.1–2.2)
ALP SERPL-CCNC: 430 U/L (ref 45–117)
ALT SERPL-CCNC: 18 U/L (ref 12–78)
ANION GAP SERPL CALC-SCNC: 8 MMOL/L (ref 5–15)
AST SERPL-CCNC: 29 U/L (ref 15–37)
BASOPHILS # BLD: 0.1 K/UL (ref 0–0.1)
BASOPHILS NFR BLD: 1 % (ref 0–1)
BILIRUB SERPL-MCNC: 0.5 MG/DL (ref 0.2–1)
BUN SERPL-MCNC: 20 MG/DL (ref 6–20)
BUN/CREAT SERPL: 25 (ref 12–20)
CALCIUM SERPL-MCNC: 7.8 MG/DL (ref 8.5–10.1)
CHLORIDE SERPL-SCNC: 98 MMOL/L (ref 97–108)
CO2 SERPL-SCNC: 29 MMOL/L (ref 21–32)
CREAT SERPL-MCNC: 0.79 MG/DL (ref 0.55–1.02)
DIFFERENTIAL METHOD BLD: ABNORMAL
EOSINOPHIL # BLD: 0.2 K/UL (ref 0–0.4)
EOSINOPHIL NFR BLD: 2 % (ref 0–7)
ERYTHROCYTE [DISTWIDTH] IN BLOOD BY AUTOMATED COUNT: 21.2 % (ref 11.5–14.5)
GLOBULIN SER CALC-MCNC: 4.4 G/DL (ref 2–4)
GLUCOSE BLD STRIP.AUTO-MCNC: 156 MG/DL (ref 65–100)
GLUCOSE BLD STRIP.AUTO-MCNC: 182 MG/DL (ref 65–100)
GLUCOSE BLD STRIP.AUTO-MCNC: 215 MG/DL (ref 65–100)
GLUCOSE BLD STRIP.AUTO-MCNC: 76 MG/DL (ref 65–100)
GLUCOSE BLD STRIP.AUTO-MCNC: 87 MG/DL (ref 65–100)
GLUCOSE SERPL-MCNC: 89 MG/DL (ref 65–100)
HCT VFR BLD AUTO: 27.5 % (ref 35–47)
HGB BLD-MCNC: 8.7 G/DL (ref 11.5–16)
IMM GRANULOCYTES # BLD: 0.1 K/UL (ref 0–0.04)
IMM GRANULOCYTES NFR BLD AUTO: 1 % (ref 0–0.5)
LYMPHOCYTES # BLD: 1.3 K/UL (ref 0.8–3.5)
LYMPHOCYTES NFR BLD: 15 % (ref 12–49)
MAGNESIUM SERPL-MCNC: 1.7 MG/DL (ref 1.6–2.4)
MCH RBC QN AUTO: 28.4 PG (ref 26–34)
MCHC RBC AUTO-ENTMCNC: 31.6 G/DL (ref 30–36.5)
MCV RBC AUTO: 89.9 FL (ref 80–99)
MONOCYTES # BLD: 0.9 K/UL (ref 0–1)
MONOCYTES NFR BLD: 11 % (ref 5–13)
NEUTS SEG # BLD: 6 K/UL (ref 1.8–8)
NEUTS SEG NFR BLD: 70 % (ref 32–75)
NRBC # BLD: 0 K/UL (ref 0–0.01)
NRBC BLD-RTO: 0 PER 100 WBC
PLATELET # BLD AUTO: 216 K/UL (ref 150–400)
PLATELET COMMENTS,PCOM: ABNORMAL
PMV BLD AUTO: 10.3 FL (ref 8.9–12.9)
POTASSIUM SERPL-SCNC: 3.6 MMOL/L (ref 3.5–5.1)
PROT SERPL-MCNC: 6.1 G/DL (ref 6.4–8.2)
RBC # BLD AUTO: 3.06 M/UL (ref 3.8–5.2)
RBC MORPH BLD: ABNORMAL
SERVICE CMNT-IMP: ABNORMAL
SERVICE CMNT-IMP: NORMAL
SERVICE CMNT-IMP: NORMAL
SODIUM SERPL-SCNC: 135 MMOL/L (ref 136–145)
WBC # BLD AUTO: 8.6 K/UL (ref 3.6–11)
WBC MORPH BLD: ABNORMAL

## 2018-05-08 PROCEDURE — 74011000250 HC RX REV CODE- 250: Performed by: INTERNAL MEDICINE

## 2018-05-08 PROCEDURE — 65660000000 HC RM CCU STEPDOWN

## 2018-05-08 PROCEDURE — 82962 GLUCOSE BLOOD TEST: CPT

## 2018-05-08 PROCEDURE — 36415 COLL VENOUS BLD VENIPUNCTURE: CPT | Performed by: HOSPITALIST

## 2018-05-08 PROCEDURE — 74011250636 HC RX REV CODE- 250/636: Performed by: HOSPITALIST

## 2018-05-08 PROCEDURE — 74011250637 HC RX REV CODE- 250/637: Performed by: HOSPITALIST

## 2018-05-08 PROCEDURE — 74011000250 HC RX REV CODE- 250: Performed by: HOSPITALIST

## 2018-05-08 PROCEDURE — 97530 THERAPEUTIC ACTIVITIES: CPT

## 2018-05-08 PROCEDURE — 83735 ASSAY OF MAGNESIUM: CPT | Performed by: NURSE PRACTITIONER

## 2018-05-08 PROCEDURE — 74011250636 HC RX REV CODE- 250/636: Performed by: INTERNAL MEDICINE

## 2018-05-08 PROCEDURE — 94640 AIRWAY INHALATION TREATMENT: CPT

## 2018-05-08 PROCEDURE — 74011250637 HC RX REV CODE- 250/637: Performed by: INTERNAL MEDICINE

## 2018-05-08 PROCEDURE — 71045 X-RAY EXAM CHEST 1 VIEW: CPT

## 2018-05-08 PROCEDURE — 74011250637 HC RX REV CODE- 250/637: Performed by: NURSE PRACTITIONER

## 2018-05-08 PROCEDURE — 80053 COMPREHEN METABOLIC PANEL: CPT | Performed by: HOSPITALIST

## 2018-05-08 PROCEDURE — 74011000258 HC RX REV CODE- 258: Performed by: HOSPITALIST

## 2018-05-08 PROCEDURE — 74011636637 HC RX REV CODE- 636/637: Performed by: HOSPITALIST

## 2018-05-08 PROCEDURE — 85025 COMPLETE CBC W/AUTO DIFF WBC: CPT | Performed by: HOSPITALIST

## 2018-05-08 RX ORDER — FUROSEMIDE 20 MG/1
20 TABLET ORAL DAILY
Status: DISCONTINUED | OUTPATIENT
Start: 2018-05-09 | End: 2018-05-11 | Stop reason: HOSPADM

## 2018-05-08 RX ADMIN — MEROPENEM 500 MG: 500 INJECTION, POWDER, FOR SOLUTION INTRAVENOUS at 03:19

## 2018-05-08 RX ADMIN — INSULIN LISPRO 2 UNITS: 100 INJECTION, SOLUTION INTRAVENOUS; SUBCUTANEOUS at 17:10

## 2018-05-08 RX ADMIN — THERA TABS 1 TABLET: TAB at 09:19

## 2018-05-08 RX ADMIN — ACETYLCYSTEINE 200 MG: 200 SOLUTION ORAL; RESPIRATORY (INHALATION) at 19:27

## 2018-05-08 RX ADMIN — DEXTROSE MONOHYDRATE AND POTASSIUM CHLORIDE: 5; .149 INJECTION, SOLUTION INTRAVENOUS at 14:35

## 2018-05-08 RX ADMIN — Medication 10 ML: at 06:54

## 2018-05-08 RX ADMIN — ANASTROZOLE 1 MG: 1 TABLET, COATED ORAL at 14:36

## 2018-05-08 RX ADMIN — IPRATROPIUM BROMIDE AND ALBUTEROL SULFATE 3 ML: .5; 3 SOLUTION RESPIRATORY (INHALATION) at 19:27

## 2018-05-08 RX ADMIN — ACETYLCYSTEINE 200 MG: 200 SOLUTION ORAL; RESPIRATORY (INHALATION) at 08:49

## 2018-05-08 RX ADMIN — Medication 10 ML: at 14:42

## 2018-05-08 RX ADMIN — LEVOFLOXACIN 750 MG: 5 INJECTION, SOLUTION INTRAVENOUS at 14:35

## 2018-05-08 RX ADMIN — MEROPENEM 500 MG: 500 INJECTION, POWDER, FOR SOLUTION INTRAVENOUS at 19:39

## 2018-05-08 RX ADMIN — Medication 10 ML: at 22:15

## 2018-05-08 RX ADMIN — MEROPENEM 500 MG: 500 INJECTION, POWDER, FOR SOLUTION INTRAVENOUS at 12:14

## 2018-05-08 RX ADMIN — Medication 10 ML: at 12:14

## 2018-05-08 RX ADMIN — Medication 10 ML: at 22:16

## 2018-05-08 RX ADMIN — DIBASIC SODIUM PHOSPHATE, MONOBASIC POTASSIUM PHOSPHATE AND MONOBASIC SODIUM PHOSPHATE 1 TABLET: 852; 155; 130 TABLET ORAL at 09:20

## 2018-05-08 RX ADMIN — CARVEDILOL 6.25 MG: 6.25 TABLET, FILM COATED ORAL at 06:55

## 2018-05-08 RX ADMIN — ASPIRIN 81 MG 81 MG: 81 TABLET ORAL at 09:19

## 2018-05-08 RX ADMIN — ACETYLCYSTEINE 200 MG: 200 SOLUTION ORAL; RESPIRATORY (INHALATION) at 15:07

## 2018-05-08 RX ADMIN — IPRATROPIUM BROMIDE AND ALBUTEROL SULFATE 3 ML: .5; 3 SOLUTION RESPIRATORY (INHALATION) at 15:07

## 2018-05-08 RX ADMIN — Medication 10 ML: at 19:40

## 2018-05-08 RX ADMIN — INSULIN LISPRO 3 UNITS: 100 INJECTION, SOLUTION INTRAVENOUS; SUBCUTANEOUS at 12:13

## 2018-05-08 RX ADMIN — DIBASIC SODIUM PHOSPHATE, MONOBASIC POTASSIUM PHOSPHATE AND MONOBASIC SODIUM PHOSPHATE 1 TABLET: 852; 155; 130 TABLET ORAL at 17:10

## 2018-05-08 RX ADMIN — Medication 1 CAPSULE: at 09:19

## 2018-05-08 RX ADMIN — SPIRONOLACTONE 25 MG: 25 TABLET, FILM COATED ORAL at 09:19

## 2018-05-08 RX ADMIN — Medication 20 ML: at 12:14

## 2018-05-08 RX ADMIN — LISINOPRIL 2.5 MG: 5 TABLET ORAL at 09:19

## 2018-05-08 RX ADMIN — LEVOTHYROXINE SODIUM 88 MCG: 88 TABLET ORAL at 06:54

## 2018-05-08 RX ADMIN — FUROSEMIDE 20 MG: 10 INJECTION, SOLUTION INTRAMUSCULAR; INTRAVENOUS at 09:19

## 2018-05-08 RX ADMIN — BUDESONIDE 500 MCG: 0.5 INHALANT RESPIRATORY (INHALATION) at 08:47

## 2018-05-08 RX ADMIN — Medication 400 MG: at 09:19

## 2018-05-08 RX ADMIN — IPRATROPIUM BROMIDE AND ALBUTEROL SULFATE 3 ML: .5; 3 SOLUTION RESPIRATORY (INHALATION) at 08:47

## 2018-05-08 NOTE — PROGRESS NOTES
HYPOGLYCEMIC EPISODE DOCUMENTATION    Patient with hypoglycemic episode(s) at 0700 (time) on 05/08/18(date). BG value(s) pre-treatment 68    Was patient symptomatic?  [] yes, [x] no  Patient was treated with the following rescue medications/treatments: [] D50                [] Glucose tablets                [] Glucagon                [x] 4oz juice                [] 6oz reg soda                [] 8oz low fat milk  BG value post-treatment: 86  Once BG treated and value greater than 80mg/dl, pt was provided with the following:  [] snack  [] meal  Name of MD notified:none  The following orders were received: none

## 2018-05-08 NOTE — DIABETES MGMT
DTC Progress Note    Recommendations/ Comments: Chart reviewed due to variable blood sugars. FBS this morning was 76 mg/dl but pre-lunch was 215 mg/dl. Noted pt was NPO for procedure. Diet has been advanced. If appropriate, please consider:  1. Adding no concentrated sweets to diet order  2. Change correction scale to high sensitivity.        Current hospital DM medication: Lantus 10 units                                                      correction scale Humalog, normal sensitivity.      Chart reviewed on Sandy Ledesma.     Patient is a 82 y.o. female with known Type 2 Diabetes on Januvia 50 mg/d; Amaryl 1 mg/d; Metformin 1000 mg BID at home. A1c:   Lab Results   Component Value Date/Time    Hemoglobin A1c 7.0 (H) 04/18/2018 08:14 AM    Hemoglobin A1c 6.9 (H) 11/30/2011 08:30 AM       Recent Glucose Results:   Lab Results   Component Value Date/Time    GLU 89 05/08/2018 03:16 AM    GLUCPOC 215 (H) 05/08/2018 11:29 AM    GLUCPOC 87 05/08/2018 06:51 AM    GLUCPOC 76 05/08/2018 06:35 AM        Lab Results   Component Value Date/Time    Creatinine 0.79 05/08/2018 03:16 AM     Estimated Creatinine Clearance: 47.5 mL/min (based on Cr of 0.79). Active Orders   Diet    DIET GI LITE (POST SURGICAL)        PO intake:   Patient Vitals for the past 72 hrs:   % Diet Eaten   05/08/18 1022 75 %   05/07/18 1730 75 %   05/06/18 2306 0 %       Will continue to follow as needed.     Thank you  Edin Melgoza, RD, CDE

## 2018-05-08 NOTE — PROGRESS NOTES
Cardiology Progress Note            Admit Date: 4/16/2018  Admit Diagnosis: SOB (shortness of breath)  colon  Anemia  COLON  Date: 5/8/2018     Time: 2:01 PM    Subjective:  States she does not want to proceed with LHC today. States she feels too weak and wants to wait a week or so before LHC. Denies chest pain, SOB. Able to lay flat. Assessment and Plan     1. NSTEMI:     -No chest pain. -Repeat TTE:4/27/18  EF unchanged 45% with mild hypokinesis  basal-mid anteroseptal and apical walls. -ASA. Continue off Plavix d/t anemia   -Continue coreg 6.25 mg BID   -Statin intolerant due to weakness on zocor, on repatha, last LDL 31, so will not rechallenge statin. On fish-oil.   -Cancel LHC for today- will consider LHC as outpatient pending patient progress. 2. Cardiomyopathy/Acute HFrEF, new:  EF 45% NYHA IV     -Lisinopril 2.5 mg po daily (watch creatinine). -Coreg to 6.25 mg BID    -Continue aldactone 25 mg daily.    -Change lasix to 20 mg po daily   -Daily weights, I/Os: net -210/24 hours   -CXR with interstitial pulmonary edema:  Suspect low albumin also contributing (albumen 1.7)    3. Partial SBO:  Resolved. General surgery following. NG tube out. GI lite diet today. 4. JEROD on CKD:   Creatinine normalized. GFR >60     5. Acute hypoxic respiratory failure:multifactorial.  On room air today- sats 98%   --Spironolactone   -Lasix 20 mg po daily   -Seems improved s/p thoracentesis. 6. Anemia:hgb 8.7 On procrit. 7. HTN- BP normalized   -Coreg to 6.25 mg BID   -Low dose ACE-I  (2.5 mg lisinopril)     8. Hx of Colon cancer, now metastatic breast ca (bone, liver,calvarium mets) on Arimedex  -Hematology oncology following. On Arimedex. 9. Advanced directives:  No shock or CPR, DNR     10. Deconditioned:  Very weak. Will need PT and rehab-     Pt refusing LHC today- wants to postpone a week or so.   Dr. Eunice Tran aware- will consider outpatient C. Follow up in office after discharge from rehab. Leslie Montana NP 0900. Cardiology Attending:Patient seen and examined. I agree with NP assessment and plans. Will sign off, OK to transfer to rehab, will follow in office. Jamin Martin MD 5/8/2018 3:59 PM           Objective:      Physical Exam:                Visit Vitals    /66 (BP 1 Location: Left arm, BP Patient Position: At rest)    Pulse 85    Temp 99.1 °F (37.3 °C)    Resp 16    Ht 5' 1\" (1.549 m)    Wt 63 kg (139 lb)    SpO2 100%    BMI 26.26 kg/m2          General Appearance:   elderly female, NAD, on RA   Ears/Nose/Mouth/Throat:    Hearing grossly normal.         Neck:  Supple. Chest:     Course breath sounds bilaterally. Mildly Decreased breath sounds on left. Cardiovascular:   Regular rate and rhythm, S1, S2 normal, no murmur   Abdomen:    Soft, nontender, no gaurding.+ bowel sounds. Extremities:  No edema bilaterally. Skin:  Warm and dry.      Telemetry: Sinus rhythm-       Data Review:    Labs:    Recent Results (from the past 24 hour(s))   GLUCOSE, POC    Collection Time: 05/07/18  5:54 PM   Result Value Ref Range    Glucose (POC) 261 (H) 65 - 100 mg/dL    Performed by [address] [place] [address], POC    Collection Time: 05/07/18 11:03 PM   Result Value Ref Range    Glucose (POC) 206 (H) 65 - 100 mg/dL    Performed by [facility], COMPREHENSIVE    Collection Time: 05/08/18  3:16 AM   Result Value Ref Range    Sodium 135 (L) 136 - 145 mmol/L    Potassium 3.6 3.5 - 5.1 mmol/L    Chloride 98 97 - 108 mmol/L    CO2 29 21 - 32 mmol/L    Anion gap 8 5 - 15 mmol/L    Glucose 89 65 - 100 mg/dL    BUN 20 6 - 20 MG/DL    Creatinine 0.79 0.55 - 1.02 MG/DL    BUN/Creatinine ratio 25 (H) 12 - 20      GFR est AA >60 >60 ml/min/1.73m2    GFR est non-AA >60 >60 ml/min/1.73m2    Calcium 7.8 (L) 8.5 - 10.1 MG/DL    Bilirubin, total 0.5 0.2 - 1.0 MG/DL    ALT (SGPT) 18 12 - 78 U/L AST (SGOT) 29 15 - 37 U/L    Alk. phosphatase 430 (H) 45 - 117 U/L    Protein, total 6.1 (L) 6.4 - 8.2 g/dL    Albumin 1.7 (L) 3.5 - 5.0 g/dL    Globulin 4.4 (H) 2.0 - 4.0 g/dL    A-G Ratio 0.4 (L) 1.1 - 2.2     CBC WITH AUTOMATED DIFF    Collection Time: 05/08/18  3:16 AM   Result Value Ref Range    WBC 8.6 3.6 - 11.0 K/uL    RBC 3.06 (L) 3.80 - 5.20 M/uL    HGB 8.7 (L) 11.5 - 16.0 g/dL    HCT 27.5 (L) 35.0 - 47.0 %    MCV 89.9 80.0 - 99.0 FL    MCH 28.4 26.0 - 34.0 PG    MCHC 31.6 30.0 - 36.5 g/dL    RDW 21.2 (H) 11.5 - 14.5 %    PLATELET 653 662 - 342 K/uL    MPV 10.3 8.9 - 12.9 FL    NRBC 0.0 0  WBC    ABSOLUTE NRBC 0.00 0.00 - 0.01 K/uL    NEUTROPHILS 70 32 - 75 %    LYMPHOCYTES 15 12 - 49 %    MONOCYTES 11 5 - 13 %    EOSINOPHILS 2 0 - 7 %    BASOPHILS 1 0 - 1 %    IMMATURE GRANULOCYTES 1 (H) 0.0 - 0.5 %    ABS. NEUTROPHILS 6.0 1.8 - 8.0 K/UL    ABS. LYMPHOCYTES 1.3 0.8 - 3.5 K/UL    ABS. MONOCYTES 0.9 0.0 - 1.0 K/UL    ABS. EOSINOPHILS 0.2 0.0 - 0.4 K/UL    ABS. BASOPHILS 0.1 0.0 - 0.1 K/UL    ABS. IMM.  GRANS. 0.1 (H) 0.00 - 0.04 K/UL    DF SMEAR SCANNED      PLATELET COMMENTS Large Platelets      RBC COMMENTS ANISOCYTOSIS  2+        RBC COMMENTS POLYCHROMASIA  1+        RBC COMMENTS OVALOCYTES  PRESENT        RBC COMMENTS TARGET CELLS  PRESENT        WBC COMMENTS HYPERSEGMENTED POLYS     MAGNESIUM    Collection Time: 05/08/18  3:16 AM   Result Value Ref Range    Magnesium 1.7 1.6 - 2.4 mg/dL   GLUCOSE, POC    Collection Time: 05/08/18  6:35 AM   Result Value Ref Range    Glucose (POC) 76 65 - 100 mg/dL    Performed by Wind[address], POC    Collection Time: 05/08/18  6:51 AM   Result Value Ref Range    Glucose (POC) 87 65 - 100 mg/dL    Performed by [address], POC    Collection Time: 05/08/18 11:29 AM   Result Value Ref Range    Glucose (POC) 215 (H) 65 - 100 mg/dL    Performed by Christina Ponce           Radiology:        Current Facility-Administered Medications   Medication Dose Route Frequency    levothyroxine (SYNTHROID) tablet 88 mcg  88 mcg Oral 6am    albuterol-ipratropium (DUO-NEB) 2.5 MG-0.5 MG/3 ML  3 mL Nebulization TID RT    insulin glargine (LANTUS) injection 10 Units  10 Units SubCUTAneous DAILY    furosemide (LASIX) injection 20 mg  20 mg IntraVENous DAILY    lisinopril (PRINIVIL, ZESTRIL) tablet 2.5 mg  2.5 mg Oral DAILY    dextrose 5% with KCl 20 mEq/L infusion   IntraVENous CONTINUOUS    levoFLOXacin (LEVAQUIN) 750 mg in D5W IVPB  750 mg IntraVENous Q48H    phosphorus (K PHOS NEUTRAL) 250 mg tablet 1 Tab  1 Tab Oral BID    acetylcysteine (MUCOMYST) 200 mg/mL (20 %) solution 200 mg  200 mg Nebulization TID RT    lactobac ac& pc-s.therm-b.anim (BERYL Q/RISAQUAD)  1 Cap Oral DAILY    naloxone (NARCAN) injection 0.4 mg  0.4 mg IntraVENous Multiple    flumazenil (ROMAZICON) 0.1 mg/mL injection 0.2 mg  0.2 mg IntraVENous Multiple    midazolam (VERSED) injection 0.25-5 mg  0.25-5 mg IntraVENous Multiple    carvedilol (COREG) tablet 6.25 mg  6.25 mg Oral BID WITH MEALS    meropenem (MERREM) 500 mg in 0.9% sodium chloride (MBP/ADV) 50 mL  0.5 g IntraVENous Q8H    aspirin chewable tablet 81 mg  81 mg Oral DAILY    spironolactone (ALDACTONE) tablet 25 mg  25 mg Oral DAILY    acetaminophen (TYLENOL) suppository 650 mg  650 mg Rectal Q4H PRN    sodium chloride (NS) flush 5-10 mL  5-10 mL IntraVENous Q8H    sodium chloride (NS) flush 5-10 mL  5-10 mL IntraVENous PRN    budesonide (PULMICORT) 500 mcg/2 ml nebulizer suspension  500 mcg Nebulization BID RT    hydrALAZINE (APRESOLINE) 20 mg/mL injection 10 mg  10 mg IntraVENous Q6H PRN    0.9% sodium chloride infusion 250 mL  250 mL IntraVENous PRN    sodium chloride (NS) flush 20 mL  20 mL InterCATHeter PRN    sodium chloride (NS) flush 10 mL  10 mL InterCATHeter Q24H    sodium chloride (NS) flush 10 mL  10 mL InterCATHeter PRN    sodium chloride (NS) flush 10 mL  10 mL InterCATHeter Q8H    alteplase (CATHFLO) 1 mg in sterile water (preservative free) 1 mL injection  1 mg InterCATHeter PRN    bacitracin 500 unit/gram packet 1 Packet  1 Packet Topical PRN    sodium chloride (NS) flush 10-30 mL  10-30 mL InterCATHeter PRN    sodium chloride (NS) flush 10 mL  10 mL InterCATHeter Q24H    sodium chloride (NS) flush 10 mL  10 mL InterCATHeter PRN    sodium chloride (NS) flush 10-40 mL  10-40 mL InterCATHeter Q8H    sodium chloride (NS) flush 20 mL  20 mL InterCATHeter Q24H    anastrozole (ARIMIDEX) tablet 1 mg  1 mg Oral DAILY    epoetin celio (EPOGEN;PROCRIT) injection 10,000 Units  10,000 Units SubCUTAneous Q MON, WED & FRI    albuterol-ipratropium (DUO-NEB) 2.5 MG-0.5 MG/3 ML  3 mL Nebulization Q6H PRN    glucose chewable tablet 16 g  4 Tab Oral PRN    dextrose (D50W) injection syrg 12.5-25 g  12.5-25 g IntraVENous PRN    glucagon (GLUCAGEN) injection 1 mg  1 mg IntraMUSCular PRN    insulin lispro (HUMALOG) injection   SubCUTAneous AC&HS    prochlorperazine (COMPAZINE) with saline injection 5 mg  5 mg IntraVENous Q8H PRN    magnesium oxide (MAG-OX) tablet 400 mg  400 mg Oral DAILY    therapeutic multivitamin (THERAGRAN) tablet 1 Tab  1 Tab Oral DAILY    sodium chloride (NS) flush 5-10 mL  5-10 mL IntraVENous Q8H    sodium chloride (NS) flush 5-10 mL  5-10 mL IntraVENous PRN    ondansetron (ZOFRAN) injection 4 mg  4 mg IntraVENous Q4H PRN          [employer] [place] [employer].  MARIETTA Montana     [facility] [address] [place] [address], [address] [place] [address] [address]   Radha Montana   [phone]

## 2018-05-08 NOTE — PROGRESS NOTES
ID Progress Note  2018    Subjective:     She remains weak. Objective:     Antibiotics:  1. Merrem  2. Levaquin  3. Fluconazole      Vitals:   Visit Vitals    /56 (BP 1 Location: Left arm, BP Patient Position: At rest;Sitting)    Pulse 81    Temp 98.4 °F (36.9 °C)    Resp 16    Ht 5' 1\" (1.549 m)    Wt 63 kg (139 lb)    SpO2 98%    BMI 26.26 kg/m2        Tmax:  Temp (24hrs), Av.5 °F (36.9 °C), Min:97.9 °F (36.6 °C), Max:99.1 °F (37.3 °C)      Exam:  Lungs:  rhonchi R anterior  Heart:  regular rate and rhythm  Abdomen:  soft, non-tender. Bowel sounds normal. No masses,  no organomegaly  Skin:  no rash or abnormalities    Labs:      Recent Labs      18   0316  18   0217  18   0355   WBC  8.6  9.7  10.2   HGB  8.7*  9.0*  9.2*   PLT  216  216  215   BUN  20  21*  22*   CREA  0.79  0.81  0.85   SGOT  29  37  36   AP  430*  396*  394*   TBILI  0.5  0.6  0.6       Cultures:     No results found for: SDES  Lab Results   Component Value Date/Time    Culture result: NO GROWTH 3 DAYS 2018 11:05 AM    Culture result: NO FUNGUS ISOLATED 2 DAYS 2018 11:05 AM    Culture result: LIGHT YEAST (A) 2018 11:30 AM    Culture result: LIGHT NORMAL RESPIRATORY BERYL 2018 11:30 AM    Culture result: HEAVY CANDIDA ALBICANS (A) 2018 11:30 AM    Culture result:  2018 11:30 AM     CULTURE WILL BE HELD FOR 4 WEEKS. IF THERE IS ADDITIONAL FUNGAL GROWTH, A NEW REPORT WILL FOLLOW. Radiology: X ray with increased opacification on the left    Line/Insert Date:           Assessment:     1. Pneumonia  1. Thoracentesis today  2. Metastatic cancer  3. Debility  4. Candida from sputum (uncertain significance)  5. Leukocytosis--WBC up and down--again improved today    Objective:     1. Continue current therapy  2. Follow up pending cultures and studies  3.  Aggressive pulmonary toilet    Young Menchaca MD

## 2018-05-08 NOTE — PROGRESS NOTES
Bedside shift change report given to Negra Garay RN (oncoming nurse) by Monty Alvarez RN (offgoing nurse). Report included the following information SBAR, Kardex, Procedure Summary, Intake/Output, MAR and Recent Results.

## 2018-05-08 NOTE — PROGRESS NOTES
Problem: Mobility Impaired (Adult and Pediatric)  Goal: *Acute Goals and Plan of Care (Insert Text)  Physical Therapy Goals  Revised 5/7/2018  1. Patient will move from supine to sit and sit to supine, scoot up and down and roll side to side in bed with moderate assistance  within 7 day(s). 2.  Patient will transfer from bed to chair and chair to bed with moderate assistance  using the least restrictive device within 7 day(s). 3.  Patient will perform sit to stand with moderate assistance  within 7 day(s). 4.  Patient will ambulate with moderate assistance  for 15 feet with the least restrictive device within 7 day(s). 5.  Patient will tolerate OOB in chair x 30 minutes for improved activity tolerance within 7 days. Physical Therapy Goals  Initiated 4/20/2018  1. Patient will move from supine to sit and sit to supine , scoot up and down and roll side to side in bed with minimal assistance/contact guard assist within 7 day(s). 2.  Patient will transfer from bed to chair and chair to bed with minimal assistance/contact guard assist using the least restrictive device within 7 day(s). 3.  Patient will perform sit to stand with minimal assistance/contact guard assist within 7 day(s). 4.  Patient will ambulate with minimal assistance/contact guard assist for 25 feet with the least restrictive device within 7 day(s). 5.  Patient will ascend/descend 3 stairs with 2 handrail(s) with minimal assistance/contact guard assist within 7 day(s). physical Therapy TREATMENT  Patient: Lyndsay Mead (14 y.o. female)  Date: 5/8/2018  Diagnosis: SOB (shortness of breath)  colon  Anemia  COLON SOB (shortness of breath)    12 Days Post-Op  Precautions: Fall, DNR  Chart, physical therapy assessment, plan of care and goals were reviewed. ASSESSMENT:  Pt seen following RN clearance this morning with caregiver support/assistance. Pt agreeable to bed mobility, transfers, and OOB to chair with therex.   Pt demonstrates improved mobility with increased independence and RW used again this morning. Demo provided for DME use to limit LLE weightbearing 2* c/o pain in knee today. Pt completed LE therex to tolerance on L (limited ROM) and RLE full ROM without trouble. Pt remains appropriate for D/C to rehab once medically stable and return to bed with assist x2 using gait belt and RW (pt's caregiver able to assist RN for pt back to bed). Progression toward goals:  []    Improving appropriately and progressing toward goals  [x]    Improving slowly and progressing toward goals  []    Not making progress toward goals and plan of care will be adjusted     PLAN:  Patient continues to benefit from skilled intervention to address the above impairments. Continue treatment per established plan of care. Discharge Recommendations:  Rehab  Further Equipment Recommendations for Discharge:  NA     SUBJECTIVE:   Patient stated No, no dizziness. ..  Pt denies symptoms in chair. OBJECTIVE DATA SUMMARY:   Critical Behavior:  Neurologic State: Alert  Orientation Level: Oriented X4  Cognition: Appropriate for age attention/concentration, Follows commands  Safety/Judgement: Awareness of environment, Fall prevention, Good awareness of safety precautions, Home safety  Functional Mobility Training:  Bed Mobility:     Supine to Sit: Moderate assistance; Additional time  Sit to Supine:  (NT; OOB to chair)  Scooting: Additional time; Moderate assistance (pt requesting to pull from caregiver)        Transfers:  Sit to Stand: Moderate assistance; Additional time;Assist x2  Stand to Sit: Moderate assistance; Additional time;Assist x2 (safety concerns with poor backing to support surface)        Bed to Chair: Moderate assistance; Additional time;Assist x2 (safey concerns with premature sit from RW)                    Balance:  Sitting: Impaired; Without support  Sitting - Static: Good (unsupported)  Sitting - Dynamic: Fair (occasional)  Standing: Impaired; With support  Standing - Static: Fair  Standing - Dynamic : Poor  Ambulation/Gait Training:  Distance (ft): 2 Feet (ft)  Assistive Device: Gait belt;Walker, rolling  Ambulation - Level of Assistance: Moderate assistance; Additional time;Assist x2        Gait Abnormalities: Antalgic;Decreased step clearance;Shuffling gait        Base of Support: Widened  Stance: Left decreased  Speed/Haylee: Slow;Shuffled;Pace decreased (<100 feet/min)  Step Length: Right shortened;Left shortened    Therapeutic Exercises:   Seated: LAQ, HR/TR, marching x10 reps  Pain:   Pt with L knee pain with AROM  Activity Tolerance:   NAD  After treatment:   [x]    Patient left in no apparent distress sitting up in chair  []    Patient left in no apparent distress in bed  [x]    Call bell left within reach  [x]    Nursing notified  [x]    Caregiver present  []    Bed alarm activated    COMMUNICATION/COLLABORATION:   The patients plan of care was discussed with: Registered Nurse    Arabella Nazario   Time Calculation: 15 mins

## 2018-05-08 NOTE — PROGRESS NOTES
-Hematology / Oncology ([facility]) -  -Primary Oncologist-  Dr Tam Levels  -CC- breast cancer    -S-  No nausea, abd. Pain, non-productive cough persists, ngt is out, she is taking her morning pills     -O-      Patient Vitals for the past 24 hrs:   Temp Pulse Resp BP SpO2   05/08/18 0850 - - - - 100 %   05/08/18 0838 99.1 °F (37.3 °C) 85 16 124/66 100 %   05/08/18 0324 97.9 °F (36.6 °C) 83 16 108/68 97 %   05/07/18 2139 - - - - 95 %   05/07/18 2057 98.4 °F (36.9 °C) 81 16 102/52 97 %   05/07/18 1730 - 82 - 107/64 -   05/07/18 1413 98.5 °F (36.9 °C) 79 16 125/61 94 %     05/08 0701 - 05/08 1900  In: 2400 [P.O.:2400]  Out: 800 [Urine:800]  Gen: nad  Chest: bilateral breath sounds   Abd- soft non tender  Cardiac: rrr  Abd: s/nt  Ext. No cce    -Labs-    Recent Labs      05/08/18   0316  05/07/18   0217  05/06/18   0355   WBC  8.6  9.7  10.2   HGB  8.7*  9.0*  9.2*   PLT  216  216  215   ANEU  6.0  6.9  7.5   NA  135*  135*  138   K  3.6  3.7  3.9   GLU  89  97  103*   BUN  20  21*  22*   CREA  0.79  0.81  0.85   ALT  18  20  23   SGOT  29  37  36   TBILI  0.5  0.6  0.6   AP  430*  396*  394*   CA  7.8*  8.2*  8.4*   MG  1.7   --    --    PHOS   --   3.2   --        -Assessment + Plan-     *) metastatic breast cancer. ER+, AR+, her-2 neg; bone scan: diffuse mets  ----- continue  Arimidex (hormonal AI therapy) started on 4/24  *) Anemia: suspect AOCD +- marrow involvement,  started procrit weekly on 4/23  ---- prbc x1 4/23, 4/26 . Stable at 9 today. *) CHF. Shonda Guzman Per cardiology. .  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 Required bronchoscopy to clear secretions again 5/1 , had thoracentesis on 5/5, cytology is pending     5/8 -?sbo. ..  Asymptomatic, ngt is out, she appears to be getting stronger, hopefully she can do some physical therapy today  D/w family    Susan Varma MD

## 2018-05-08 NOTE — PROGRESS NOTES
Hospitalist Progress Note  Rupesh Kerns MD  Answering service: [phone] OR [phone] from in house phone        Date of Service:  2018  NAME:  Fortunato Villagran  :  1935  MRN:  [id number]      Admission Summary:   The patient is an 72-year-old female with history of diabetes and rheumatoid arthritis who initially presented to the emergency room via EMS with complaints of shortness of breath    Interval history / Subjective:       Patent refused cath and said she didn't feel like getting it done today   Feels weak   Needs insurance auth . Assessment & Plan:     Acute on chronic hypoxic/hypercarbic respiratory failure due to pulmonary edema, pneumonia  -s/p bronchoscopy on  and on  suctioned and cleared  -respiratory culture grow on  and  grow candida albicans, on diflucan which is stopped   -,resp distress and CXR worsening worsening of near complete left hemithorax opacification.  -On and off BIPAP  -S/p bronch and clearing of secretion on   -CXR shows Increased bilateral pleural effusions and lung opacities resulting in near  complete opacification of the left hemithorax. - S/p thoracentesis  - Consider pleurx drain if recurs   - Oxygen saturation is stable  Will get cxr in am .  Cytology is negative for malignancy . Acute encephalopathy possible due to metabolic. Resolved  -CT head on  volume loss and minimal white matter disease. No acute intracranial [place], Sinus mucosal inflammatory change  -MRI of brain  Study limited by motion artifact, volume loss and white matter disease. No definite intracranial metastasis however no intravenous contrast was administered due to poor renal function. 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   - continue supportive care    Sepsis secondary to E Coli UTI, pneumonia  POA  -urine cx   grow e coli, pseudomanas sp  -blood cx on ,  and  no growth  Antinfectives  -Ceftriaxone 1 dose on 4/19  -Diflucan 4/26-present  -Levaquin 4/24-present  -Meropenem 4/24-present  -Zosyn 4/19-4/24  -Vancomycin 4/19-4/28  - Antibiotics management as per Infectious disease. NSTEMI  - on aspirin,coreg. Statin intolerant,she is on fish oil. She was getting Evolocumab injections out patient.  -cardiologist on board patient refused cath . Acute systolic CHF NYHA Class IV  -Intermittent diuresis,on hydralazine, spironolactone,coreg   -cardiology following    JEROD on CKD stage III,creatinien improved  -creatinine improved and stabilized. Increase Lasix to 40 mg daily  -nephrologist on board    Metastatic left breast cancer with diffuse bone mets.+er,+Pr  -on armidex   -Hem/Onc on board    HTN  -BP stablen , coreg monitor BP. Added low dose lisinopril 5/3    DM-II  -Blood glucose 170-130s. She off the oral medications(Glimepiride,sitagliptin and metformin) she was taking PTA  -Add low dose Lantus 8 units sc daily,continue insulin sliding scale, monitor finger stick glucose    Hypothyroidism  -continue synthroid    Hx of GERD  -continue pepcid    Elevated liver enzyme   -possible related to liver lesion, monitor LFT    Anemia multifactorial  -Hgb 6.7, s/p 2 units pRBC on 4/26,   -Hgb 9.3, monitor H/H    Hx of colon cancer 25 years ago  -according to son, there is a work-up planned with [facility] regarding the possibility of recurrent CA (based on lesions seen in the calvarium on MRI - 3/19). -hem/onc on board    H/o stroke with residual weakness on the left    Diet,suspect silent aspiration. --    Hypomagnesemia: iv mg today,on oral magnesium. H/O Seropositive RA of multiple sites,stable. -Apparently she was getting Etanercept sc injections out patient. Debility: PT/OT requested. She likely needs rehab on discharge    Partial SBO   GI lite diet today  SBO seems to be resolved  Continue current care      WILL TALK TO ID TO SEE IF WE CAN TAPER DOWN ANTIBIOTICS AS C/S NGTD Code status: DNR  DVT prophylaxis:SCD    Care Plan discussed with: Patient/Family and Nurse  Disposition:in ICU    Contact Ismael weber Loges . if needed      Hospital Problems  Date Reviewed: 4/16/2018          Codes Class Noted POA    Acute systolic heart failure ([facility] [address] ICD-10-CM: I50.21  ICD-9-CM: 428.21  4/24/2018 Unknown        Altered mental status, unspecified ICD-10-CM: R41.82  ICD-9-CM: 780.97  4/23/2018 Unknown        * (Principal)SOB (shortness of breath) ICD-10-CM: R06.02  ICD-9-CM: 786.05  4/16/2018 Unknown                Vital Signs:    Last 24hrs VS reviewed since prior progress note. Most recent are:  Visit Vitals    /56 (BP 1 Location: Left arm, BP Patient Position: At rest;Sitting)    Pulse 81    Temp 98.4 °F (36.9 °C)    Resp 16    Ht 5' 1\" (1.549 m)    Wt 63 kg (139 lb)    SpO2 98%    BMI 26.26 kg/m2         Intake/Output Summary (Last 24 hours) at 05/08/18 1811  Last data filed at 05/08/18 1636   Gross per 24 hour   Intake             2640 ml   Output             1400 ml   Net             1240 ml        Physical Examination:             Constitutional:  Alert and conversant,on nasal canula   ENT:  Oral mucous moist, oropharynx benign. Neck supple,    Resp:  Decrease bronchial breath sound, few wheezing and rhonic bilaterally. No accessory muscle use   CV:  Regular rhythm, normal rate, no murmurs, gallops, rubs    GI:  Soft, non distended, non tender. normoactive bowel sounds, no hepatosplenomegaly     Musculoskeletal:  No edema    Neurologic:  Conscious and alert, oriented to place and person, answer questions, moves all extremities. Left side weak     Skin:  Good turgor, no rashes or ulcers       Data Review:    Review and/or order of clinical lab test      Labs:     Recent Labs      05/08/18 0316  05/07/18   0217   WBC  8.6  9.7   HGB  8.7*  9.0*   HCT  27.5*  28.5*   PLT  216  216     Recent Labs      05/08/18   0316  05/07/18   0217  05/06/18   0355   NA  135* 135*  138   K  3.6  3.7  3.9   CL  98  97  99   CO2  29  29  30   BUN  20  21*  22*   CREA  0.79  0.81  0.85   GLU  89  97  103*   CA  7.8*  8.2*  8.4*   MG  1.7   --    --    PHOS   --   3.2   --      Recent Labs      05/08/18   0316  05/07/18   0217  05/06/18   0355   SGOT  29  37  36   ALT  18  20  23   AP  430*  396*  394*   TBILI  0.5  0.6  0.6   TP  6.1*  6.1*  6.0*   ALB  1.7*  1.7*  1.7*   GLOB  4.4*  4.4*  4.3*     No results for input(s): INR, PTP, APTT in the last 72 hours. No lab exists for component: INREXT, INREXT   No results for input(s): FE, TIBC, PSAT, FERR in the last 72 hours. No results found for: FOL, RBCF   No results for input(s): PH, PCO2, PO2 in the last 72 hours. No results for input(s): CPK, CKNDX, TROIQ in the last 72 hours.     No lab exists for component: CPKMB  Lab Results   Component Value Date/Time    Cholesterol, total 74 04/16/2018 04:21 AM    HDL Cholesterol 25 04/16/2018 04:21 AM    LDL, calculated 31.6 04/16/2018 04:21 AM    Triglyceride 87 04/16/2018 04:21 AM    CHOL/HDL Ratio 3.0 04/16/2018 04:21 AM     Lab Results   Component Value Date/Time    Glucose (POC) 182 (H) 05/08/2018 04:28 PM    Glucose (POC) 215 (H) 05/08/2018 11:29 AM    Glucose (POC) 87 05/08/2018 06:51 AM    Glucose (POC) 76 05/08/2018 06:35 AM    Glucose (POC) 206 (H) 05/07/2018 11:03 PM     Lab Results   Component Value Date/Time    Color YELLOW/STRAW 04/20/2018 04:30 AM    Appearance TURBID (A) 04/20/2018 04:30 AM    Specific gravity 1.029 04/20/2018 04:30 AM    pH (UA) 5.0 04/20/2018 04:30 AM    Protein 100 (A) 04/20/2018 04:30 AM    Glucose NEGATIVE  04/20/2018 04:30 AM    Ketone NEGATIVE  04/20/2018 04:30 AM    Bilirubin NEGATIVE  04/20/2018 04:30 AM    Urobilinogen 0.2 04/20/2018 04:30 AM    Nitrites NEGATIVE  04/20/2018 04:30 AM    Leukocyte Esterase LARGE (A) 04/20/2018 04:30 AM    Epithelial cells FEW 04/20/2018 04:30 AM    Bacteria 3+ (A) 04/20/2018 04:30 AM    WBC >100 (H) 04/20/2018 04:30 AM RBC 5-10 04/20/2018 04:30 AM         Medications Reviewed:     Current Facility-Administered Medications   Medication Dose Route Frequency    [START ON 5/9/2018] furosemide (LASIX) tablet 20 mg  20 mg Oral DAILY    levothyroxine (SYNTHROID) tablet 88 mcg  88 mcg Oral 6am    albuterol-ipratropium (DUO-NEB) 2.5 MG-0.5 MG/3 ML  3 mL Nebulization TID RT    insulin glargine (LANTUS) injection 10 Units  10 Units SubCUTAneous DAILY    lisinopril (PRINIVIL, ZESTRIL) tablet 2.5 mg  2.5 mg Oral DAILY    dextrose 5% with KCl 20 mEq/L infusion   IntraVENous CONTINUOUS    levoFLOXacin (LEVAQUIN) 750 mg in D5W IVPB  750 mg IntraVENous Q48H    phosphorus (K PHOS NEUTRAL) 250 mg tablet 1 Tab  1 Tab Oral BID    acetylcysteine (MUCOMYST) 200 mg/mL (20 %) solution 200 mg  200 mg Nebulization TID RT    lactobac ac& pc-s.therm-b.anim (BERYL Q/RISAQUAD)  1 Cap Oral DAILY    naloxone (NARCAN) injection 0.4 mg  0.4 mg IntraVENous Multiple    flumazenil (ROMAZICON) 0.1 mg/mL injection 0.2 mg  0.2 mg IntraVENous Multiple    midazolam (VERSED) injection 0.25-5 mg  0.25-5 mg IntraVENous Multiple    carvedilol (COREG) tablet 6.25 mg  6.25 mg Oral BID WITH MEALS    meropenem (MERREM) 500 mg in 0.9% sodium chloride (MBP/ADV) 50 mL  0.5 g IntraVENous Q8H    aspirin chewable tablet 81 mg  81 mg Oral DAILY    spironolactone (ALDACTONE) tablet 25 mg  25 mg Oral DAILY    acetaminophen (TYLENOL) suppository 650 mg  650 mg Rectal Q4H PRN    sodium chloride (NS) flush 5-10 mL  5-10 mL IntraVENous Q8H    sodium chloride (NS) flush 5-10 mL  5-10 mL IntraVENous PRN    budesonide (PULMICORT) 500 mcg/2 ml nebulizer suspension  500 mcg Nebulization BID RT    hydrALAZINE (APRESOLINE) 20 mg/mL injection 10 mg  10 mg IntraVENous Q6H PRN    0.9% sodium chloride infusion 250 mL  250 mL IntraVENous PRN    sodium chloride (NS) flush 20 mL  20 mL InterCATHeter PRN    sodium chloride (NS) flush 10 mL  10 mL InterCATHeter Q24H    sodium chloride (NS) flush 10 mL  10 mL InterCATHeter PRN    sodium chloride (NS) flush 10 mL  10 mL InterCATHeter Q8H    alteplase (CATHFLO) 1 mg in sterile water (preservative free) 1 mL injection  1 mg InterCATHeter PRN    bacitracin 500 unit/gram packet 1 Packet  1 Packet Topical PRN    sodium chloride (NS) flush 10-30 mL  10-30 mL InterCATHeter PRN    sodium chloride (NS) flush 10 mL  10 mL InterCATHeter Q24H    sodium chloride (NS) flush 10 mL  10 mL InterCATHeter PRN    sodium chloride (NS) flush 10-40 mL  10-40 mL InterCATHeter Q8H    sodium chloride (NS) flush 20 mL  20 mL InterCATHeter Q24H    anastrozole (ARIMIDEX) tablet 1 mg  1 mg Oral DAILY    epoetin celio (EPOGEN;PROCRIT) injection 10,000 Units  10,000 Units SubCUTAneous Q MON, WED & FRI    albuterol-ipratropium (DUO-NEB) 2.5 MG-0.5 MG/3 ML  3 mL Nebulization Q6H PRN    glucose chewable tablet 16 g  4 Tab Oral PRN    dextrose (D50W) injection syrg 12.5-25 g  12.5-25 g IntraVENous PRN    glucagon (GLUCAGEN) injection 1 mg  1 mg IntraMUSCular PRN    insulin lispro (HUMALOG) injection   SubCUTAneous AC&HS    prochlorperazine (COMPAZINE) with saline injection 5 mg  5 mg IntraVENous Q8H PRN    magnesium oxide (MAG-OX) tablet 400 mg  400 mg Oral DAILY    therapeutic multivitamin (THERAGRAN) tablet 1 Tab  1 Tab Oral DAILY    sodium chloride (NS) flush 5-10 mL  5-10 mL IntraVENous Q8H    sodium chloride (NS) flush 5-10 mL  5-10 mL IntraVENous PRN    ondansetron (ZOFRAN) injection 4 mg  4 mg IntraVENous Q4H PRN     ______________________________________________________________________  EXPECTED LENGTH OF STAY: 4d 21h  ACTUAL LENGTH OF STAY:          22                 Parviz Whalen MD

## 2018-05-08 NOTE — TELEPHONE ENCOUNTER
Left message for pts son Hong Fields and let him know that Dr. Rigo Strickland does not want Ms. Ledesma to take Enbrel or Otrexup while she is in the hospital.  I asked him to let us know when she is discharged and we will let him know what she needs to do at that time.

## 2018-05-08 NOTE — PROGRESS NOTES
Bedside shift change report given to Gilbert Ashby (oncoming nurse) by Onel Sanchez (offgoing nurse). Report included the following information SBAR, Kardex and MAR.

## 2018-05-09 ENCOUNTER — APPOINTMENT (OUTPATIENT)
Dept: GENERAL RADIOLOGY | Age: 83
DRG: 853 | End: 2018-05-09
Attending: INTERNAL MEDICINE
Payer: MEDICARE

## 2018-05-09 LAB
ALBUMIN SERPL-MCNC: 1.9 G/DL (ref 3.5–5)
ALBUMIN/GLOB SERPL: 0.4 {RATIO} (ref 1.1–2.2)
ALP SERPL-CCNC: 488 U/L (ref 45–117)
ALT SERPL-CCNC: 19 U/L (ref 12–78)
ANION GAP SERPL CALC-SCNC: 9 MMOL/L (ref 5–15)
AST SERPL-CCNC: 32 U/L (ref 15–37)
BACTERIA SPEC CULT: NORMAL
BASOPHILS # BLD: 0.1 K/UL (ref 0–0.1)
BASOPHILS NFR BLD: 1 % (ref 0–1)
BILIRUB SERPL-MCNC: 0.4 MG/DL (ref 0.2–1)
BUN SERPL-MCNC: 16 MG/DL (ref 6–20)
BUN/CREAT SERPL: 19 (ref 12–20)
CALCIUM SERPL-MCNC: 8.2 MG/DL (ref 8.5–10.1)
CHLORIDE SERPL-SCNC: 101 MMOL/L (ref 97–108)
CO2 SERPL-SCNC: 26 MMOL/L (ref 21–32)
CREAT SERPL-MCNC: 0.85 MG/DL (ref 0.55–1.02)
DIFFERENTIAL METHOD BLD: ABNORMAL
EOSINOPHIL # BLD: 0.2 K/UL (ref 0–0.4)
EOSINOPHIL NFR BLD: 3 % (ref 0–7)
ERYTHROCYTE [DISTWIDTH] IN BLOOD BY AUTOMATED COUNT: 21.1 % (ref 11.5–14.5)
GLOBULIN SER CALC-MCNC: 4.6 G/DL (ref 2–4)
GLUCOSE BLD STRIP.AUTO-MCNC: 124 MG/DL (ref 65–100)
GLUCOSE BLD STRIP.AUTO-MCNC: 206 MG/DL (ref 65–100)
GLUCOSE BLD STRIP.AUTO-MCNC: 212 MG/DL (ref 65–100)
GLUCOSE BLD STRIP.AUTO-MCNC: 219 MG/DL (ref 65–100)
GLUCOSE SERPL-MCNC: 127 MG/DL (ref 65–100)
GRAM STN SPEC: NORMAL
GRAM STN SPEC: NORMAL
HCT VFR BLD AUTO: 29.2 % (ref 35–47)
HGB BLD-MCNC: 9.2 G/DL (ref 11.5–16)
IMM GRANULOCYTES # BLD: 0.1 K/UL (ref 0–0.04)
IMM GRANULOCYTES NFR BLD AUTO: 1 % (ref 0–0.5)
LYMPHOCYTES # BLD: 0.9 K/UL (ref 0.8–3.5)
LYMPHOCYTES NFR BLD: 12 % (ref 12–49)
MCH RBC QN AUTO: 28.5 PG (ref 26–34)
MCHC RBC AUTO-ENTMCNC: 31.5 G/DL (ref 30–36.5)
MCV RBC AUTO: 90.4 FL (ref 80–99)
MONOCYTES # BLD: 0.9 K/UL (ref 0–1)
MONOCYTES NFR BLD: 12 % (ref 5–13)
NEUTS SEG # BLD: 5.7 K/UL (ref 1.8–8)
NEUTS SEG NFR BLD: 71 % (ref 32–75)
NRBC # BLD: 0 K/UL (ref 0–0.01)
NRBC BLD-RTO: 0 PER 100 WBC
PLATELET # BLD AUTO: 214 K/UL (ref 150–400)
PLATELET COMMENTS,PCOM: ABNORMAL
PMV BLD AUTO: 10.2 FL (ref 8.9–12.9)
POTASSIUM SERPL-SCNC: 4 MMOL/L (ref 3.5–5.1)
PROT SERPL-MCNC: 6.5 G/DL (ref 6.4–8.2)
RBC # BLD AUTO: 3.23 M/UL (ref 3.8–5.2)
RBC MORPH BLD: ABNORMAL
RBC MORPH BLD: ABNORMAL
SERVICE CMNT-IMP: ABNORMAL
SERVICE CMNT-IMP: NORMAL
SODIUM SERPL-SCNC: 136 MMOL/L (ref 136–145)
WBC # BLD AUTO: 7.9 K/UL (ref 3.6–11)
WBC MORPH BLD: ABNORMAL

## 2018-05-09 PROCEDURE — 97535 SELF CARE MNGMENT TRAINING: CPT | Performed by: OCCUPATIONAL THERAPIST

## 2018-05-09 PROCEDURE — 97530 THERAPEUTIC ACTIVITIES: CPT

## 2018-05-09 PROCEDURE — 36415 COLL VENOUS BLD VENIPUNCTURE: CPT | Performed by: HOSPITALIST

## 2018-05-09 PROCEDURE — 74011000258 HC RX REV CODE- 258: Performed by: HOSPITALIST

## 2018-05-09 PROCEDURE — 74011250637 HC RX REV CODE- 250/637: Performed by: INTERNAL MEDICINE

## 2018-05-09 PROCEDURE — 74011250637 HC RX REV CODE- 250/637: Performed by: HOSPITALIST

## 2018-05-09 PROCEDURE — 74011250637 HC RX REV CODE- 250/637: Performed by: NURSE PRACTITIONER

## 2018-05-09 PROCEDURE — 74011000250 HC RX REV CODE- 250: Performed by: HOSPITALIST

## 2018-05-09 PROCEDURE — 74011250636 HC RX REV CODE- 250/636: Performed by: INTERNAL MEDICINE

## 2018-05-09 PROCEDURE — 85025 COMPLETE CBC W/AUTO DIFF WBC: CPT | Performed by: HOSPITALIST

## 2018-05-09 PROCEDURE — 80053 COMPREHEN METABOLIC PANEL: CPT | Performed by: HOSPITALIST

## 2018-05-09 PROCEDURE — 82962 GLUCOSE BLOOD TEST: CPT

## 2018-05-09 PROCEDURE — 74011250637 HC RX REV CODE- 250/637: Performed by: SURGERY

## 2018-05-09 PROCEDURE — 74011636637 HC RX REV CODE- 636/637: Performed by: HOSPITALIST

## 2018-05-09 PROCEDURE — 71046 X-RAY EXAM CHEST 2 VIEWS: CPT

## 2018-05-09 PROCEDURE — 74011250636 HC RX REV CODE- 250/636: Performed by: HOSPITALIST

## 2018-05-09 PROCEDURE — 94640 AIRWAY INHALATION TREATMENT: CPT

## 2018-05-09 PROCEDURE — 65660000000 HC RM CCU STEPDOWN

## 2018-05-09 PROCEDURE — 97116 GAIT TRAINING THERAPY: CPT

## 2018-05-09 PROCEDURE — 74011000250 HC RX REV CODE- 250: Performed by: INTERNAL MEDICINE

## 2018-05-09 RX ORDER — ACETAMINOPHEN 325 MG/1
650 TABLET ORAL ONCE
Status: COMPLETED | OUTPATIENT
Start: 2018-05-10 | End: 2018-05-09

## 2018-05-09 RX ORDER — POLYETHYLENE GLYCOL 3350 17 G/17G
17 POWDER, FOR SOLUTION ORAL DAILY
Status: DISCONTINUED | OUTPATIENT
Start: 2018-05-09 | End: 2018-05-11 | Stop reason: HOSPADM

## 2018-05-09 RX ADMIN — Medication 20 ML: at 12:05

## 2018-05-09 RX ADMIN — Medication 10 ML: at 23:19

## 2018-05-09 RX ADMIN — CARVEDILOL 6.25 MG: 6.25 TABLET, FILM COATED ORAL at 08:17

## 2018-05-09 RX ADMIN — IPRATROPIUM BROMIDE AND ALBUTEROL SULFATE 3 ML: .5; 3 SOLUTION RESPIRATORY (INHALATION) at 13:01

## 2018-05-09 RX ADMIN — ACETYLCYSTEINE 200 MG: 200 SOLUTION ORAL; RESPIRATORY (INHALATION) at 19:33

## 2018-05-09 RX ADMIN — INSULIN LISPRO 3 UNITS: 100 INJECTION, SOLUTION INTRAVENOUS; SUBCUTANEOUS at 17:24

## 2018-05-09 RX ADMIN — LISINOPRIL 2.5 MG: 5 TABLET ORAL at 02:05

## 2018-05-09 RX ADMIN — INSULIN LISPRO 2 UNITS: 100 INJECTION, SOLUTION INTRAVENOUS; SUBCUTANEOUS at 23:18

## 2018-05-09 RX ADMIN — ACETYLCYSTEINE 200 MG: 200 SOLUTION ORAL; RESPIRATORY (INHALATION) at 13:01

## 2018-05-09 RX ADMIN — ASPIRIN 81 MG 81 MG: 81 TABLET ORAL at 12:02

## 2018-05-09 RX ADMIN — IPRATROPIUM BROMIDE AND ALBUTEROL SULFATE 3 ML: .5; 3 SOLUTION RESPIRATORY (INHALATION) at 09:06

## 2018-05-09 RX ADMIN — LEVOTHYROXINE SODIUM 88 MCG: 88 TABLET ORAL at 05:19

## 2018-05-09 RX ADMIN — ACETYLCYSTEINE 200 MG: 200 SOLUTION ORAL; RESPIRATORY (INHALATION) at 09:06

## 2018-05-09 RX ADMIN — Medication 10 ML: at 05:16

## 2018-05-09 RX ADMIN — INSULIN LISPRO 3 UNITS: 100 INJECTION, SOLUTION INTRAVENOUS; SUBCUTANEOUS at 12:00

## 2018-05-09 RX ADMIN — POLYETHYLENE GLYCOL 3350 17 G: 17 POWDER, FOR SOLUTION ORAL at 12:00

## 2018-05-09 RX ADMIN — EPOETIN ALFA 10000 UNITS: 10000 SOLUTION INTRAVENOUS; SUBCUTANEOUS at 17:30

## 2018-05-09 RX ADMIN — MEROPENEM 500 MG: 500 INJECTION, POWDER, FOR SOLUTION INTRAVENOUS at 03:30

## 2018-05-09 RX ADMIN — Medication 10 ML: at 12:05

## 2018-05-09 RX ADMIN — DIBASIC SODIUM PHOSPHATE, MONOBASIC POTASSIUM PHOSPHATE AND MONOBASIC SODIUM PHOSPHATE 1 TABLET: 852; 155; 130 TABLET ORAL at 17:30

## 2018-05-09 RX ADMIN — DIBASIC SODIUM PHOSPHATE, MONOBASIC POTASSIUM PHOSPHATE AND MONOBASIC SODIUM PHOSPHATE 1 TABLET: 852; 155; 130 TABLET ORAL at 12:04

## 2018-05-09 RX ADMIN — MEROPENEM 500 MG: 500 INJECTION, POWDER, FOR SOLUTION INTRAVENOUS at 12:04

## 2018-05-09 RX ADMIN — INSULIN GLARGINE 10 UNITS: 100 INJECTION, SOLUTION SUBCUTANEOUS at 12:00

## 2018-05-09 RX ADMIN — BUDESONIDE 500 MCG: 0.5 INHALANT RESPIRATORY (INHALATION) at 09:06

## 2018-05-09 RX ADMIN — Medication 1 CAPSULE: at 12:04

## 2018-05-09 RX ADMIN — SPIRONOLACTONE 25 MG: 25 TABLET, FILM COATED ORAL at 12:01

## 2018-05-09 RX ADMIN — FUROSEMIDE 20 MG: 20 TABLET ORAL at 12:02

## 2018-05-09 RX ADMIN — THERA TABS 1 TABLET: TAB at 12:02

## 2018-05-09 RX ADMIN — Medication 10 ML: at 05:17

## 2018-05-09 RX ADMIN — IPRATROPIUM BROMIDE AND ALBUTEROL SULFATE 3 ML: .5; 3 SOLUTION RESPIRATORY (INHALATION) at 19:34

## 2018-05-09 RX ADMIN — BUDESONIDE 500 MCG: 0.5 INHALANT RESPIRATORY (INHALATION) at 19:34

## 2018-05-09 RX ADMIN — CARVEDILOL 6.25 MG: 6.25 TABLET, FILM COATED ORAL at 17:25

## 2018-05-09 RX ADMIN — ANASTROZOLE 1 MG: 1 TABLET, COATED ORAL at 12:03

## 2018-05-09 RX ADMIN — ACETAMINOPHEN 650 MG: 325 TABLET, FILM COATED ORAL at 23:18

## 2018-05-09 RX ADMIN — Medication 10 ML: at 23:18

## 2018-05-09 RX ADMIN — Medication 400 MG: at 12:02

## 2018-05-09 NOTE — WOUND CARE
WOCN Note:     Follow-up visit for gluteal cleft. Chart shows:  Admitted for shortness of breath; history of colon cancer, DM, CVA, RA, HTN    Assessment:   Patient is communicative and assists in repositioning. Patient wearing briefs for incontinence and cleaned of small amount of urine. Bed: Ela  Patient reports no pain. Bilateral heel, buttocks and sacral skin intact and without erythema. Heels offloaded on pillow. 1. Gluteal cleft MASD = 1.5 x 0.7 x 0.1 cm  100% moist red and blanching. No exudate. Periwound intact with newly resurfaced area immediate to wound. [employer] barrier cream applied. Patient repositioned on right side. Wound Recommendations:    Continue barrier cream as ordered. Skin Care & Pressure Relief Recommendations:  Minimize layers of linen/pads under patient to optimize support surface. Turn/reposition approximately every 2 hours and offload heels. Manage incontinence    Discussed above plan with patient.     Transition of Care: Plan to follow weekly and as needed while admitted to hospital.     Nelia Spurling, BSN, RN, [facility] [place]  Certified Wound, Ostomy, Continence Nurse  office [phone]  pager [phone] or call  to page

## 2018-05-09 NOTE — PROGRESS NOTES
Problem: Mobility Impaired (Adult and Pediatric)  Goal: *Acute Goals and Plan of Care (Insert Text)  Physical Therapy Goals  Revised 5/7/2018  1. Patient will move from supine to sit and sit to supine, scoot up and down and roll side to side in bed with moderate assistance  within 7 day(s). 2.  Patient will transfer from bed to chair and chair to bed with moderate assistance  using the least restrictive device within 7 day(s). 3.  Patient will perform sit to stand with moderate assistance  within 7 day(s). 4.  Patient will ambulate with moderate assistance  for 15 feet with the least restrictive device within 7 day(s). 5.  Patient will tolerate OOB in chair x 30 minutes for improved activity tolerance within 7 days. Physical Therapy Goals  Initiated 4/20/2018  1. Patient will move from supine to sit and sit to supine , scoot up and down and roll side to side in bed with minimal assistance/contact guard assist within 7 day(s). 2.  Patient will transfer from bed to chair and chair to bed with minimal assistance/contact guard assist using the least restrictive device within 7 day(s). 3.  Patient will perform sit to stand with minimal assistance/contact guard assist within 7 day(s). 4.  Patient will ambulate with minimal assistance/contact guard assist for 25 feet with the least restrictive device within 7 day(s). 5.  Patient will ascend/descend 3 stairs with 2 handrail(s) with minimal assistance/contact guard assist within 7 day(s). physical Therapy TREATMENT  Patient: Samira Orozco (60 y.o. female)  Date: 5/9/2018  Diagnosis: SOB (shortness of breath)  colon  Anemia  COLON SOB (shortness of breath)    13 Days Post-Op  Precautions: Fall, DNR  Chart, physical therapy assessment, plan of care and goals were reviewed. ASSESSMENT:Patient able to move supine to sit log roll technique with max assist and verbal cues with increased time to complete the task.  Patient needed max assist to scoot to edge of the bed with facilitation for weight shifting and to scoot hip forward. With bed slightly raised, patient able to stand with max assist. She was reluctant to attempt ambulation other than transfer to chair but therapist explained her RA pain might get better with time up. After a few steps patient with decreased knee pain and she was able to ambulate 20 feet using a walker with min assist mostly for walker management. Patient will benefit from rehab before returning home, if she does return home she will need 24/7 assist from someone who is capable of giving her the physical assist she needs and HHPT. Progression toward goals:  []    Improving appropriately and progressing toward goals  [x]    Improving slowly and progressing toward goals  []    Not making progress toward goals and plan of care will be adjusted     PLAN:  Patient continues to benefit from skilled intervention to address the above impairments. Continue treatment per established plan of care. Discharge Recommendations:  [address] and To Be Determined  Further Equipment Recommendations for Discharge:will continue to assess     SUBJECTIVE:   Patient stated I did better.     OBJECTIVE DATA SUMMARY:   Critical Behavior:  Neurologic State: Alert  Orientation Level: Oriented X4  Cognition: Appropriate safety awareness  Safety/Judgement: Awareness of environment, Fall prevention, Good awareness of safety precautions, Home safety  Functional Mobility Training:  Bed Mobility:  Rolling: Maximum assistance (log rolling to left)  Supine to Sit: Maximum assistance   Scooting: Maximum assistance (to scoot to edge of bed in sitting position, assist needed to shift weight then to slide hip forward)   Transfers:  Sit to Stand: Maximum assistance (bed slightly raised)  Stand to Sit: Moderate assistance            Balance:  Sitting: Impaired  Sitting - Static: Fair (occasional)  Sitting - Dynamic: Fair (occasional)  Standing: Impaired; With support  Standing - Static: Poor  Standing - Dynamic : Poor  Ambulation/Gait Training:  Distance (ft): 20 Feet (ft)  Assistive Device: Gait belt;Walker, rolling  Ambulation - Level of Assistance: min assist   Gait Abnormalities: Antalgic;Decreased step clearance   Base of Support: Narrowed  Stance: Right decreased  Speed/Haylee: Pace decreased (<100 feet/min)  Step Length: Left shortened;Right shortened    Therapeutic Exercises:   Sitting ankle pumps, LAQ, marching x 10 reps  Pain:  Pain Scale 1: Numeric (0 - 10)  Pain Intensity 1: 0      Activity Tolerance:     Please refer to the flowsheet for vital signs taken during this treatment.   After treatment:   [x]    Patient left in no apparent distress sitting up in chair  []    Patient left in no apparent distress in bed  [x]    Call bell left within reach  [x]    Nursing notified  []    Caregiver present  []    Bed alarm activated    COMMUNICATION/COLLABORATION:   The patients plan of care was discussed with: Registered Nurse    Misty Pendleton PT   Time Calculation: 25 mins

## 2018-05-09 NOTE — CONSULTS
Consult    Subjective:     Samm Travis is a 82 y.o.  female who is being seen for left pleural effusion. She was admitted on 18 for SOB. Treated with BiPAP O2 support for several days, later weaned to NC. She has a history of colon cancer in , CHF and RA. Recent diagnosed with metastatic breast cancer. Currently followed by Dr Lelo Ann being treated with Arimidex. She underwent thoracentesis on 18 for 900 ml fluid with benign cystology. Repeated CXR suggest recurrence of the effusion.     Past Medical History:   Diagnosis Date    Anemia 2009    Colon cancer ([facility] [facility] 2009    surgery/chemo    Colon cancer ([facility] [address] [facility] 2009    DM (diabetes mellitus) ([facility] [address] 2009    GERD (gastroesophageal reflux disease)     H/O: CVA 2009    slight l sided weakness    Hypothyroid 2009    Ill-defined condition     seasonal allergies    Microalbuminuria 2009    Osteoporosis 2009    Other and unspecified hyperlipidemia 2010    Rheumatoid arthritis involving ankle ([facility] [address] 2016    Rheumatoid arthritis(714.0) 2009    Unspecified essential hypertension 2009    Weakness due to cerebrovascular accident       Past Surgical History:   Procedure Laterality Date    HX COLECTOMY      HX HYSTERECTOMY      HX OTHER SURGICAL      colonoscopies numerous since      Family History   Problem Relation Age of Onset    Diabetes Mother     Stroke Mother     Diabetes Sister     Other Sister      fell and hit her head -  of this   [facility] Diabetes Brother     Cancer Father      stomach    Diabetes Sister       Social History   Substance Use Topics    Smoking status: Never Smoker    Smokeless tobacco: Never Used    Alcohol use No       Current Facility-Administered Medications   Medication Dose Route Frequency    polyethylene glycol (MIRALAX) packet 17 g  17 g Oral DAILY    furosemide (LASIX) tablet 20 mg  20 mg Oral DAILY    levothyroxine (SYNTHROID) tablet 88 mcg  88 mcg Oral 6am  albuterol-ipratropium (DUO-NEB) 2.5 MG-0.5 MG/3 ML  3 mL Nebulization TID RT    insulin glargine (LANTUS) injection 10 Units  10 Units SubCUTAneous DAILY    lisinopril (PRINIVIL, ZESTRIL) tablet 2.5 mg  2.5 mg Oral DAILY    levoFLOXacin (LEVAQUIN) 750 mg in D5W IVPB  750 mg IntraVENous Q48H    phosphorus (K PHOS NEUTRAL) 250 mg tablet 1 Tab  1 Tab Oral BID    acetylcysteine (MUCOMYST) 200 mg/mL (20 %) solution 200 mg  200 mg Nebulization TID RT    lactobac ac& pc-s.therm-b.anim (BERYL Q/RISAQUAD)  1 Cap Oral DAILY    naloxone (NARCAN) injection 0.4 mg  0.4 mg IntraVENous Multiple    flumazenil (ROMAZICON) 0.1 mg/mL injection 0.2 mg  0.2 mg IntraVENous Multiple    midazolam (VERSED) injection 0.25-5 mg  0.25-5 mg IntraVENous Multiple    carvedilol (COREG) tablet 6.25 mg  6.25 mg Oral BID WITH MEALS    meropenem (MERREM) 500 mg in 0.9% sodium chloride (MBP/ADV) 50 mL  0.5 g IntraVENous Q8H    aspirin chewable tablet 81 mg  81 mg Oral DAILY    spironolactone (ALDACTONE) tablet 25 mg  25 mg Oral DAILY    acetaminophen (TYLENOL) suppository 650 mg  650 mg Rectal Q4H PRN    sodium chloride (NS) flush 5-10 mL  5-10 mL IntraVENous Q8H    sodium chloride (NS) flush 5-10 mL  5-10 mL IntraVENous PRN    budesonide (PULMICORT) 500 mcg/2 ml nebulizer suspension  500 mcg Nebulization BID RT    hydrALAZINE (APRESOLINE) 20 mg/mL injection 10 mg  10 mg IntraVENous Q6H PRN    0.9% sodium chloride infusion 250 mL  250 mL IntraVENous PRN    sodium chloride (NS) flush 20 mL  20 mL InterCATHeter PRN    sodium chloride (NS) flush 10 mL  10 mL InterCATHeter Q24H    sodium chloride (NS) flush 10 mL  10 mL InterCATHeter PRN    sodium chloride (NS) flush 10 mL  10 mL InterCATHeter Q8H    alteplase (CATHFLO) 1 mg in sterile water (preservative free) 1 mL injection  1 mg InterCATHeter PRN    bacitracin 500 unit/gram packet 1 Packet  1 Packet Topical PRN    sodium chloride (NS) flush 10-30 mL  10-30 mL InterCATHeter PRN    sodium chloride (NS) flush 10 mL  10 mL InterCATHeter Q24H    sodium chloride (NS) flush 10 mL  10 mL InterCATHeter PRN    sodium chloride (NS) flush 10-40 mL  10-40 mL InterCATHeter Q8H    sodium chloride (NS) flush 20 mL  20 mL InterCATHeter Q24H    anastrozole (ARIMIDEX) tablet 1 mg  1 mg Oral DAILY    epoetin celio (EPOGEN;PROCRIT) injection 10,000 Units  10,000 Units SubCUTAneous Q MON, WED & FRI    albuterol-ipratropium (DUO-NEB) 2.5 MG-0.5 MG/3 ML  3 mL Nebulization Q6H PRN    glucose chewable tablet 16 g  4 Tab Oral PRN    dextrose (D50W) injection syrg 12.5-25 g  12.5-25 g IntraVENous PRN    glucagon (GLUCAGEN) injection 1 mg  1 mg IntraMUSCular PRN    insulin lispro (HUMALOG) injection   SubCUTAneous AC&HS    prochlorperazine (COMPAZINE) with saline injection 5 mg  5 mg IntraVENous Q8H PRN    magnesium oxide (MAG-OX) tablet 400 mg  400 mg Oral DAILY    therapeutic multivitamin (THERAGRAN) tablet 1 Tab  1 Tab Oral DAILY    sodium chloride (NS) flush 5-10 mL  5-10 mL IntraVENous Q8H    sodium chloride (NS) flush 5-10 mL  5-10 mL IntraVENous PRN    ondansetron (ZOFRAN) injection 4 mg  4 mg IntraVENous Q4H PRN      Allergies   Allergen Reactions    Statins-Hmg-Coa Reductase Inhibitors Other (comments)     Intolerant to statins     Sulfa (Sulfonamide Antibiotics) Other (comments)     syncope        Review of Systems:  Constitutional: negative  Respiratory: positive for dyspnea on exertion  Cardiovascular: negative     Objective: Intake and Output:       05/07 1901 - 05/09 0700  In: 2640 [P.O.:2640]  Out: 9557 [Urine:3050]    Physical Exam:   Visit Vitals    /71    Pulse 86    Temp 98.5 °F (36.9 °C)    Resp 28    Ht 5' 1\" (1.549 m)    Wt 143 lb 3.2 oz (65 kg)    SpO2 97%    BMI 27.06 kg/m2     General appearance: alert, cooperative, no distress, appears stated age  Lungs: RR even unlabored  Heart: regular rate and rhythm  Abdomen: soft, non-tender.  Bowel sounds normal. No masses,  no organomegaly  Extremities: extremities normal, atraumatic, no cyanosis or edema    ECG:  Sinus tachycardia, Poor R-wave Progression on 4/16/18    Data Review:   Recent Results (from the past 24 hour(s))   GLUCOSE, POC    Collection Time: 05/08/18  4:28 PM   Result Value Ref Range    Glucose (POC) 182 (H) 65 - 100 mg/dL    Performed by Jamila ALICIA, POC    Collection Time: 05/08/18  9:55 PM   Result Value Ref Range    Glucose (POC) 156 (H) 65 - 100 mg/dL    Performed by [address] [place] [address], COMPREHENSIVE    Collection Time: 05/09/18  5:30 AM   Result Value Ref Range    Sodium 136 136 - 145 mmol/L    Potassium 4.0 3.5 - 5.1 mmol/L    Chloride 101 97 - 108 mmol/L    CO2 26 21 - 32 mmol/L    Anion gap 9 5 - 15 mmol/L    Glucose 127 (H) 65 - 100 mg/dL    BUN 16 6 - 20 MG/DL    Creatinine 0.85 0.55 - 1.02 MG/DL    BUN/Creatinine ratio 19 12 - 20      GFR est AA >60 >60 ml/min/1.73m2    GFR est non-AA >60 >60 ml/min/1.73m2    Calcium 8.2 (L) 8.5 - 10.1 MG/DL    Bilirubin, total 0.4 0.2 - 1.0 MG/DL    ALT (SGPT) 19 12 - 78 U/L    AST (SGOT) 32 15 - 37 U/L    Alk. phosphatase 488 (H) 45 - 117 U/L    Protein, total 6.5 6.4 - 8.2 g/dL    Albumin 1.9 (L) 3.5 - 5.0 g/dL    Globulin 4.6 (H) 2.0 - 4.0 g/dL    A-G Ratio 0.4 (L) 1.1 - 2.2     CBC WITH AUTOMATED DIFF    Collection Time: 05/09/18  5:30 AM   Result Value Ref Range    WBC 7.9 3.6 - 11.0 K/uL    RBC 3.23 (L) 3.80 - 5.20 M/uL    HGB 9.2 (L) 11.5 - 16.0 g/dL    HCT 29.2 (L) 35.0 - 47.0 %    MCV 90.4 80.0 - 99.0 FL    MCH 28.5 26.0 - 34.0 PG    MCHC 31.5 30.0 - 36.5 g/dL    RDW 21.1 (H) 11.5 - 14.5 %    PLATELET 309 006 - 395 K/uL    MPV 10.2 8.9 - 12.9 FL    NRBC 0.0 0  WBC    ABSOLUTE NRBC 0.00 0.00 - 0.01 K/uL    NEUTROPHILS 71 32 - 75 %    LYMPHOCYTES 12 12 - 49 %    MONOCYTES 12 5 - 13 %    EOSINOPHILS 3 0 - 7 %    BASOPHILS 1 0 - 1 %    IMMATURE GRANULOCYTES 1 (H) 0.0 - 0.5 %    ABS. NEUTROPHILS 5.7 1.8 - 8.0 K/UL    ABS. LYMPHOCYTES 0.9 0.8 - 3.5 K/UL    ABS. MONOCYTES 0.9 0.0 - 1.0 K/UL    ABS. EOSINOPHILS 0.2 0.0 - 0.4 K/UL    ABS. BASOPHILS 0.1 0.0 - 0.1 K/UL    ABS. IMM. GRANS. 0.1 (H) 0.00 - 0.04 K/UL    DF MANUAL      PLATELET COMMENTS Large Platelets      RBC COMMENTS ANISOCYTOSIS  2+        RBC COMMENTS OVALOCYTES  PRESENT        WBC COMMENTS HYPERSEGMENTED POLYS     GLUCOSE, POC    Collection Time: 05/09/18  6:36 AM   Result Value Ref Range    Glucose (POC) 124 (H) 65 - 100 mg/dL    Performed by Windy Darby [address], POC    Collection Time: 05/09/18 11:33 AM   Result Value Ref Range    Glucose (POC) 206 (H) 65 - 100 mg/dL    Performed by Alejandro Edge        Chest x-ray was positive for pleural  effusion in left.     Assessment:     Principal Problem:    SOB (shortness of breath) (4/16/2018)    Active Problems:    Altered mental status, unspecified (4/23/2018)      Acute systolic heart failure ([address] (4/24/2018)        Plan:   Awaiting repeat CXR, will review and make recommedations      Signed By: Fransisca Mcleod NP     May 9, 2018     CXR this AM shows no increase in effusion     At present would recommend continued Lasix and treatment  Of underlying disease  Would consider either Talc pleurodesis or Pleur X catheter if effusion recurs

## 2018-05-09 NOTE — PROGRESS NOTES
NUTRITION COMPLETE ASSESSMENT    RECOMMENDATIONS:   1. Continue to encourage oral intake   - consume supplements between meals and/or with medications   - document % meals consumed in flowsheet  2. Add bowel regimen - no BM since 4/26  3. Continue daily weights    Interventions/Plan:   Food/Nutrient Delivery:    Commercial supplement (Glucerna BID, Magic Cup)s          Assessment:   Reason for Assessment:  [x]Reassessment     Diet: GI lite  Supplements: Magic Cup  Nutritionally Significant Medications: [x] Reviewed & Includes: coreg, epogen, lasix, lantus (10 units), SSI, kaila-Q, synthroid, mag-ox, merrem, K Phos, spironolactone, MVI; PRN: zofran, compazine  Meal Intake:   Patient Vitals for the past 100 hrs:   % Diet Eaten   05/09/18 1253 50 %   05/08/18 1426 50 %   05/08/18 1022 75 %   05/07/18 1730 75 %   05/06/18 2306 0 %     Subjective: I like vanilla Ensure    Objective:  Pt admitted with SOB. PMHx: DM, Colon cancer, RA, CVA, others noted. Hypoxic respiratory failure with worsening left PE but negative for malignancy. Thoracic surgery consulted. Newly diagnosed metastatic breast cancer with mets to bone. CHF with NSTEMI this admit but refused cardiac cath yesterday. SBO this admit but now resolved. No BM documented since 4/26 - consider adding bowel regimen. NPO/clears x 4 days with diet advanced yesterday. PO intake good today per RN and pt. Pt visited with little to add beyond liking vanilla Ensure. Will add Glucerna BID (4400kcal, 20g protein) and continue Magic Cup as ordered. BG increased since diet advanced. DTC following for insulin recommendations. Weight fluctuating this admit with diuretics rx. Continue daily weights. Plan to follow for wt trends, PO intake, supplement consumption, and BM.    Estimated Nutrition Needs:   Kcals/day: 1540 Kcals/day (MSJ x 1.4)  Protein: 68 g (1.2g/kg)  Fluid:  (1 ml/kcal)  Based On: [place]  Weight Used: Actual wt (57 kg)    Pt expected to meet estimated nutrient needs:  [x]   Yes (with supplements)     []  No  [] Unable to predict at this time  Nutrition Diagnosis:   1. Inadequate oral intake (improving) related to altered GI fx as evidenced by SBO resolved; diet advanced and consuming >50% meals    Goals:     Consumption of at least 50% of meals and 1-2 supplements/day in 5-7 days     Monitoring & Evaluation:    - Total energy intake   - Weight/weight change    Previous Nutrition Goals Met: N/A  Previous Recommendations:    N/A    Education & Discharge Needs:   [x] None Identified   [] Identified and addressed    [x] Participated in care plan, discharge planning, and/or interdisciplinary rounds     Cultural, Church and ethnic food preferences identified: None    Skin Integrity: []Intact  [x] see WOCN documentation  Edema: []None [x] Trace BUE's  Last BM: 4/26  Food Allergies: [x]None []Other    Anthropometrics:    Weight Loss Metrics 5/9/2018 4/16/2018 4/3/2018 3/19/2018 2/9/2018 2/9/2018 12/20/2017   Today's Wt 143 lb 3.2 oz - 124 lb 129 lb 132 lb 127 lb 132 lb   BMI - 27.06 kg/m2 23.43 kg/m2 24.37 kg/m2 24.94 kg/m2 24 kg/m2 24.94 kg/m2      Last 3 Recorded Weights in this Encounter    05/07/18 0255 05/08/18 0319 05/09/18 0520   Weight: 62.8 kg (138 lb 8 oz) 63 kg (139 lb) 65 kg (143 lb 3.2 oz)      Weight Source: Bed  Height: 5' 1\" (154.9 cm),    Body mass index is 27.06 kg/(m^2).   IBW : 47.6 kg (105 lb), % IBW (Calculated): 119.89 %   ,      Labs:    Lab Results   Component Value Date/Time    Sodium 136 05/09/2018 05:30 AM    Potassium 4.0 05/09/2018 05:30 AM    Chloride 101 05/09/2018 05:30 AM    CO2 26 05/09/2018 05:30 AM    Glucose 127 (H) 05/09/2018 05:30 AM    BUN 16 05/09/2018 05:30 AM    Creatinine 0.85 05/09/2018 05:30 AM    Calcium 8.2 (L) 05/09/2018 05:30 AM    Magnesium 1.7 05/08/2018 03:16 AM    Phosphorus 3.2 05/07/2018 02:17 AM    Albumin 1.9 (L) 05/09/2018 05:30 AM     Mike Hull RD Pager #[phone] or [phone]

## 2018-05-09 NOTE — PROGRESS NOTES
Problem: Self Care Deficits Care Plan (Adult)  Goal: *Acute Goals and Plan of Care (Insert Text)  Occupational Therapy Goals  Initiated 4/20/2018; Reviewed 4/30/18; Reviewed 5/7/18  1. Patient will perform grooming set up assist bedside level within 7 days. 2.  Patient will perform bathing seated with moderate assistance  within 7 day(s). 3.  Patient will perform upper body dressing with minimal assistance/contact guard assist within 7 day(s). 4.  Patient will perform toilet transfers with minimal assistance/contact guard assist within 7 day(s). 5.  Patient will participate in upper extremity therapeutic exercise/activities with supervision/set-up for 10 minutes within 7 day(s). 6.  Patient will utilize energy conservation techniques during functional activities with verbal cues within 7 day(s). Occupational Therapy TREATMENT  Patient: Davion Lou (10 y.o. female)  Date: 5/9/2018  Diagnosis: SOB (shortness of breath)  colon  Anemia  COLON SOB (shortness of breath)    13 Days Post-Op  Precautions: Fall, DNR  Chart, occupational therapy assessment, plan of care, and goals were reviewed. ASSESSMENT:  Patient making progress, improved bed mobility to min assist, re-ed on skin protection. Will continue to follow  Progression toward goals:  []       Improving appropriately and progressing toward goals  [x]       Improving slowly and progressing toward goals  []       Not making progress toward goals and plan of care will be adjusted     PLAN:  Patient continues to benefit from skilled intervention to address the above impairments. Continue treatment per established plan of care. Discharge Recommendations:  Rehab  Further Equipment Recommendations for Discharge:       SUBJECTIVE:   Patient stated I know I should.  re: move UE/LE more in bed    OBJECTIVE DATA SUMMARY:   Cognitive/Behavioral Status:  Neurologic State: Alert  Orientation Level: Oriented X4  Cognition: Follows commands; Appropriate safety awareness; Appropriate for age attention/concentration; Appropriate decision making  Perception: Appears intact  Perseveration: No perseveration noted  Safety/Judgement: Awareness of environment; Fall prevention;Home safety; Insight into deficits    Functional Mobility and Transfers for ADLs:  Bed Mobility:  Rolling: Minimum assistance; Other (comment) (left and right with bedrail)  Supine to Sit: Maximum assistance  Scooting: Maximum assistance (to scoot to edge of bed in sitting position, assist needed to shift weight then to slide hip forward)    Transfers:  Sit to Stand: Maximum assistance (bed slightly raised)          Balance:  Sitting: Impaired  Sitting - Static: Fair (occasional)  Sitting - Dynamic: Fair (occasional)  Standing: Impaired; With support  Standing - Static: Poor  Standing - Dynamic : Poor    ADL Intervention:          Educated on skin protection and need to call for assist if need to void          Toileting  Toileting Assistance: Maximum assistance (sidelying)  Bladder Hygiene: Minimum assistance  Bowel Hygiene: Total assistance (dependent)  Clothing Management: Total assistance (dependent)    Cognitive Retraining  Safety/Judgement: Awareness of environment; Fall prevention;Home safety; Insight into deficits       Therapeutic Exercises:   Instructed to bridge in bed and perform UE and LE AROM exercises. Initially with pain in left LE with any movement, however after moving more pain decreased. Pain:   initially with movement, improved     Activity Tolerance:   fair  Please refer to the flowsheet for vital signs taken during this treatment.   After treatment:   [] Patient left in no apparent distress sitting up in chair  [x] Patient left in no apparent distress in bed  [x] Call bell left within reach  [x] Nursing notified  [] Caregiver present  [] Bed alarm activated    COMMUNICATION/COLLABORATION:   The patients plan of care was discussed with: Registered Nurse    Iwona Ott OTR/KIZZY  Time Calculation: 16 mins

## 2018-05-09 NOTE — PROGRESS NOTES
ID Progress Note  2018    Subjective:     She remains weak. Objective:     Antibiotics:  1. Merrem  2. Levaquin  3. Fluconazole      Vitals:   Visit Vitals    /67 (BP 1 Location: Right arm, BP Patient Position: At rest)    Pulse 88    Temp 98.8 °F (37.1 °C)    Resp 22    Ht 5' 1\" (1.549 m)    Wt 65 kg (143 lb 3.2 oz)    SpO2 96%    BMI 27.06 kg/m2        Tmax:  Temp (24hrs), Av.5 °F (36.9 °C), Min:98.1 °F (36.7 °C), Max:98.8 °F (37.1 °C)      Exam:  Lungs:  rhonchi R anterior  Heart:  regular rate and rhythm  Abdomen:  soft, non-tender. Bowel sounds normal. No masses,  no organomegaly  Skin:  no rash or abnormalities    Labs:      Recent Labs      18   0530  18   0316  18   0217   WBC  7.9  8.6  9.7   HGB  9.2*  8.7*  9.0*   PLT  214  216  216   BUN  16  20  21*   CREA  0.85  0.79  0.81   SGOT  32  29  37   AP  488*  430*  396*   TBILI  0.4  0.5  0.6       Cultures:     No results found for: SDES  Lab Results   Component Value Date/Time    Culture result: NO GROWTH 4 DAYS 2018 11:05 AM    Culture result: NO FUNGUS ISOLATED 2 DAYS 2018 11:05 AM    Culture result: LIGHT YEAST (A) 2018 11:30 AM    Culture result: LIGHT NORMAL RESPIRATORY BERYL 2018 11:30 AM    Culture result: HEAVY CANDIDA ALBICANS (A) 2018 11:30 AM    Culture result:  2018 11:30 AM     CULTURE WILL BE HELD FOR 4 WEEKS. IF THERE IS ADDITIONAL FUNGAL GROWTH, A NEW REPORT WILL FOLLOW. Radiology: X ray with increased opacification on the left    Line/Insert Date:           Assessment:     1. Pneumonia  1. Thoracentesis today  2. Metastatic cancer  3. Debility  4. Candida from sputum (uncertain significance)  5. Leukocytosis--WBC up and down--again improved today    Objective:     1. Continue current therapy  2. Follow up pending cultures and studies  3. Aggressive pulmonary toilet  4.  Home on oral Levaquin until  when discharged    Sergio Banks MD

## 2018-05-09 NOTE — CONSULTS
Palliative Medicine    Goals are clear at this time, will sign off.   Please call as needed [phone]

## 2018-05-09 NOTE — PROGRESS NOTES
Hospitalist Progress Note  Karlos Lake MD  Answering service: [phone] or [phone] from in house phone        Date of Service:  2018  NAME:  Yusra Arnold  :  1935  MRN:  [id number]      Admission Summary:   The patient is an 82-year-old female with history of diabetes and rheumatoid arthritis who initially presented to the emergency room via EMS with complaints of shortness of breath    Interval history / Subjective:     Patient feels good today   No sob and not on oxygen , does have crackles left base   Thoracic surgery opinion sorted for the effusion , though cytology is negative       Patient refused cath and she wants to get it done outpatient    Will discuss with Dr Jeffry Torres regarding antibiotics as can be discharged otherwise        Assessment & Plan:     Acute on chronic hypoxic/hypercarbic respiratory failure due to pulmonary edema, pneumonia  -s/p bronchoscopy on  and on  suctioned and cleared  -respiratory culture grow on  and  grow candida albicans, on diflucan which is stopped   -,resp distress and CXR worsening worsening of near complete left hemithorax opacification.  -On and off BIPAP  -S/p bronch and clearing of secretion on   -CXR shows Increased bilateral pleural effusions and lung opacities resulting in near  complete opacification of the left hemithorax. - S/p thoracentesis  - Consider pleurx drain if recurs   - Oxygen saturation is stable  Cytology is negative for malignancy . Acute encephalopathy possible due to metabolic. Resolved  -CT head on  volume loss and minimal white matter disease. No acute intracranial [place], Sinus mucosal inflammatory change  -MRI of brain  Study limited by motion artifact, volume loss and white matter disease. No definite intracranial metastasis however no intravenous contrast was administered due to poor renal function.  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 - continue supportive care    Sepsis secondary to E Coli UTI, pneumonia  POA  -urine cx  4/19 grow e coli, pseudomanas sp  -blood cx on 4/18, 4/19 and 4/22 no growth  Antinfectives  -Ceftriaxone 1 dose on 4/19  -Diflucan 4/26-present  -Levaquin 4/24-present  -Meropenem 4/24-present  -Zosyn 4/19-4/24  -Vancomycin 4/19-4/28  - Antibiotics management as per Infectious disease. NSTEMI  - on aspirin,coreg. Statin intolerant,she is on fish oil. She was getting Evolocumab injections out patient.  -cardiologist on board patient refused cath .will be done outpatient     Acute systolic CHF NYHA Class IV  -Intermittent diuresis,coreg   Systolic function  was mildly reduced. Ejection fraction was estimated to be 45 %. There was  mild hypokinesis of the basal-mid anteroseptal and apical septal wall(s). -cardiology following    JEROD on CKD stage III,creatinien improved  -creatinine improved and stabilized. Increase Lasix to 40 mg daily, now back to 20 daily   -nephrologist on board    Metastatic left breast cancer with diffuse bone mets.+er,+Pr  -on armidex   -Hem/Onc on board    HTN  -BP stablen , coreg monitor BP. Added low dose lisinopril 5/3    DM-II  -Blood glucose 170-130s. She off the oral medications(Glimepiride,sitagliptin and metformin) she was taking PTA  -Add low dose Lantus 8 units sc daily,continue insulin sliding scale, monitor finger stick glucose    Hypothyroidism  -continue synthroid    Hx of GERD  -continue pepcid    Elevated liver enzyme   -possible related to liver lesion, monitor LFT    Anemia multifactorial  -Hgb 6.7, s/p 2 units pRBC on 4/26,   -Hgb 9.3, monitor H/H    Hx of colon cancer 25 years ago  -according to son, there is a work-up planned with [facility] regarding the possibility of recurrent CA (based on lesions seen in the calvarium on MRI - 3/19). -hem/onc on board    H/o stroke with residual weakness on the left    Diet,suspect silent aspiration.     Hypomagnesemia: iv mg today,on oral magnesium. H/O Seropositive RA of multiple sites,stable. -Apparently she was getting Etanercept sc injections out patient. Debility: PT/OT requested. She likely needs rehab on discharge    Partial SBO   GI lite diet today  SBO seems to be resolved  Continue current care  miralax       WILL TALK TO ID TO SEE IF WE CAN TAPER DOWN ANTIBIOTICS AS C/S NGTD   Will discuss with Dr Frida Day . Code status: DNR  DVT prophylaxis:SCD    Care Plan discussed with: Patient/Family and Nurse  Disposition:in ICU    Contact sonKurtis . if needed      Hospital Problems  Date Reviewed: 4/16/2018          Codes Class Noted POA    Acute systolic heart failure ([facility] [address] ICD-10-CM: I50.21  ICD-9-CM: 428.21  4/24/2018 Unknown        Altered mental status, unspecified ICD-10-CM: R41.82  ICD-9-CM: 780.97  4/23/2018 Unknown        * (Principal)SOB (shortness of breath) ICD-10-CM: R06.02  ICD-9-CM: 786.05  4/16/2018 Unknown                Vital Signs:    Last 24hrs VS reviewed since prior progress note. Most recent are:  Visit Vitals    /71    Pulse 86    Temp 98.5 °F (36.9 °C)    Resp 28    Ht 5' 1\" (1.549 m)    Wt 65 kg (143 lb 3.2 oz)    SpO2 96%    BMI 27.06 kg/m2         Intake/Output Summary (Last 24 hours) at 05/09/18 1336  Last data filed at 05/09/18 1253   Gross per 24 hour   Intake              360 ml   Output             2050 ml   Net            -1690 ml        Physical Examination:             Constitutional:  Alert and conversant,on nasal canula   ENT:  Oral mucous moist, oropharynx benign. Neck supple,    Resp:  Decrease bronchial breath sound, few wheezing and rhonic bilaterally. No accessory muscle use   CV:  Regular rhythm, normal rate, no murmurs, gallops, rubs    GI:  Soft, non distended, non tender.  normoactive bowel sounds, no hepatosplenomegaly     Musculoskeletal:  No edema    Neurologic:  Conscious and alert, oriented to place and person, answer questions, moves all extremities. Left side weak     Skin:  Good turgor, no rashes or ulcers       Data Review:    Review and/or order of clinical lab test      Labs:     Recent Labs      05/09/18 0530 05/08/18 0316   WBC  7.9  8.6   HGB  9.2*  8.7*   HCT  29.2*  27.5*   PLT  214  216     Recent Labs      05/09/18 0530 05/08/18 0316 05/07/18 0217   NA  136  135*  135*   K  4.0  3.6  3.7   CL  101  98  97   CO2  26  29  29   BUN  16  20  21*   CREA  0.85  0.79  0.81   GLU  127*  89  97   CA  8.2*  7.8*  8.2*   MG   --   1.7   --    PHOS   --    --   3.2     Recent Labs      05/09/18 0530 05/08/18 0316 05/07/18 0217   SGOT  32  29  37   ALT  19  18  20   AP  488*  430*  396*   TBILI  0.4  0.5  0.6   TP  6.5  6.1*  6.1*   ALB  1.9*  1.7*  1.7*   GLOB  4.6*  4.4*  4.4*     No results for input(s): INR, PTP, APTT in the last 72 hours. No lab exists for component: INREXT, INREXT   No results for input(s): FE, TIBC, PSAT, FERR in the last 72 hours. No results found for: FOL, RBCF   No results for input(s): PH, PCO2, PO2 in the last 72 hours. No results for input(s): CPK, CKNDX, TROIQ in the last 72 hours.     No lab exists for component: CPKMB  Lab Results   Component Value Date/Time    Cholesterol, total 74 04/16/2018 04:21 AM    HDL Cholesterol 25 04/16/2018 04:21 AM    LDL, calculated 31.6 04/16/2018 04:21 AM    Triglyceride 87 04/16/2018 04:21 AM    CHOL/HDL Ratio 3.0 04/16/2018 04:21 AM     Lab Results   Component Value Date/Time    Glucose (POC) 206 (H) 05/09/2018 11:33 AM    Glucose (POC) 124 (H) 05/09/2018 06:36 AM    Glucose (POC) 156 (H) 05/08/2018 09:55 PM    Glucose (POC) 182 (H) 05/08/2018 04:28 PM    Glucose (POC) 215 (H) 05/08/2018 11:29 AM     Lab Results   Component Value Date/Time    Color YELLOW/STRAW 04/20/2018 04:30 AM    Appearance TURBID (A) 04/20/2018 04:30 AM    Specific gravity 1.029 04/20/2018 04:30 AM    pH (UA) 5.0 04/20/2018 04:30 AM    Protein 100 (A) 04/20/2018 04:30 AM    Glucose NEGATIVE  04/20/2018 04:30 AM    Ketone NEGATIVE  04/20/2018 04:30 AM    Bilirubin NEGATIVE  04/20/2018 04:30 AM    Urobilinogen 0.2 04/20/2018 04:30 AM    Nitrites NEGATIVE  04/20/2018 04:30 AM    Leukocyte Esterase LARGE (A) 04/20/2018 04:30 AM    Epithelial cells FEW 04/20/2018 04:30 AM    Bacteria 3+ (A) 04/20/2018 04:30 AM    WBC >100 (H) 04/20/2018 04:30 AM    RBC 5-10 04/20/2018 04:30 AM         Medications Reviewed:     Current Facility-Administered Medications   Medication Dose Route Frequency    polyethylene glycol (MIRALAX) packet 17 g  17 g Oral DAILY    furosemide (LASIX) tablet 20 mg  20 mg Oral DAILY    levothyroxine (SYNTHROID) tablet 88 mcg  88 mcg Oral 6am    albuterol-ipratropium (DUO-NEB) 2.5 MG-0.5 MG/3 ML  3 mL Nebulization TID RT    insulin glargine (LANTUS) injection 10 Units  10 Units SubCUTAneous DAILY    lisinopril (PRINIVIL, ZESTRIL) tablet 2.5 mg  2.5 mg Oral DAILY    levoFLOXacin (LEVAQUIN) 750 mg in D5W IVPB  750 mg IntraVENous Q48H    phosphorus (K PHOS NEUTRAL) 250 mg tablet 1 Tab  1 Tab Oral BID    acetylcysteine (MUCOMYST) 200 mg/mL (20 %) solution 200 mg  200 mg Nebulization TID RT    lactobac ac& pc-s.therm-b.anim (BERYL Q/RISAQUAD)  1 Cap Oral DAILY    naloxone (NARCAN) injection 0.4 mg  0.4 mg IntraVENous Multiple    flumazenil (ROMAZICON) 0.1 mg/mL injection 0.2 mg  0.2 mg IntraVENous Multiple    midazolam (VERSED) injection 0.25-5 mg  0.25-5 mg IntraVENous Multiple    carvedilol (COREG) tablet 6.25 mg  6.25 mg Oral BID WITH MEALS    meropenem (MERREM) 500 mg in 0.9% sodium chloride (MBP/ADV) 50 mL  0.5 g IntraVENous Q8H    aspirin chewable tablet 81 mg  81 mg Oral DAILY    spironolactone (ALDACTONE) tablet 25 mg  25 mg Oral DAILY    acetaminophen (TYLENOL) suppository 650 mg  650 mg Rectal Q4H PRN    sodium chloride (NS) flush 5-10 mL  5-10 mL IntraVENous Q8H    sodium chloride (NS) flush 5-10 mL  5-10 mL IntraVENous PRN    budesonide (PULMICORT) 500 mcg/2 ml nebulizer suspension  500 mcg Nebulization BID RT    hydrALAZINE (APRESOLINE) 20 mg/mL injection 10 mg  10 mg IntraVENous Q6H PRN    0.9% sodium chloride infusion 250 mL  250 mL IntraVENous PRN    sodium chloride (NS) flush 20 mL  20 mL InterCATHeter PRN    sodium chloride (NS) flush 10 mL  10 mL InterCATHeter Q24H    sodium chloride (NS) flush 10 mL  10 mL InterCATHeter PRN    sodium chloride (NS) flush 10 mL  10 mL InterCATHeter Q8H    alteplase (CATHFLO) 1 mg in sterile water (preservative free) 1 mL injection  1 mg InterCATHeter PRN    bacitracin 500 unit/gram packet 1 Packet  1 Packet Topical PRN    sodium chloride (NS) flush 10-30 mL  10-30 mL InterCATHeter PRN    sodium chloride (NS) flush 10 mL  10 mL InterCATHeter Q24H    sodium chloride (NS) flush 10 mL  10 mL InterCATHeter PRN    sodium chloride (NS) flush 10-40 mL  10-40 mL InterCATHeter Q8H    sodium chloride (NS) flush 20 mL  20 mL InterCATHeter Q24H    anastrozole (ARIMIDEX) tablet 1 mg  1 mg Oral DAILY    epoetin celio (EPOGEN;PROCRIT) injection 10,000 Units  10,000 Units SubCUTAneous Q MON, WED & FRI    albuterol-ipratropium (DUO-NEB) 2.5 MG-0.5 MG/3 ML  3 mL Nebulization Q6H PRN    glucose chewable tablet 16 g  4 Tab Oral PRN    dextrose (D50W) injection syrg 12.5-25 g  12.5-25 g IntraVENous PRN    glucagon (GLUCAGEN) injection 1 mg  1 mg IntraMUSCular PRN    insulin lispro (HUMALOG) injection   SubCUTAneous AC&HS    prochlorperazine (COMPAZINE) with saline injection 5 mg  5 mg IntraVENous Q8H PRN    magnesium oxide (MAG-OX) tablet 400 mg  400 mg Oral DAILY    therapeutic multivitamin (THERAGRAN) tablet 1 Tab  1 Tab Oral DAILY    sodium chloride (NS) flush 5-10 mL  5-10 mL IntraVENous Q8H    sodium chloride (NS) flush 5-10 mL  5-10 mL IntraVENous PRN    ondansetron (ZOFRAN) injection 4 mg  4 mg IntraVENous Q4H PRN     ______________________________________________________________________  EXPECTED LENGTH OF STAY: 4d 21h  ACTUAL LENGTH OF STAY:          Gomez Bob MD

## 2018-05-09 NOTE — PROGRESS NOTES
ANDRE spoke with Mario Alberto Hu from [facility] is having difficulties finding pt's insurance? Their team keeps coming back saying that pt is not listed as being enrolled in an active Medicare/Medicaid plan? Mario Alberto Hu has contacted the son and the son plans to call her back to review pt's insurance with them. The Hospital has the information that pt's son has provided, and there is a reference number in payor communication where information has been submitted to them. Not sure why [facility] is having trouble with finding her active insurance plan?   Quinton Mar, BSW, ACM

## 2018-05-09 NOTE — PROGRESS NOTES
-Hematology / Oncology ([facility]) -  -Primary Oncologist-  Dr Estefany Mo  -CC- breast cancer    -S- no new complaints    -O-      Patient Vitals for the past 24 hrs:   Temp Pulse Resp BP SpO2   05/09/18 0907 - - - - 97 %   05/09/18 0819 98.5 °F (36.9 °C) 90 28 150/75 95 %   05/09/18 0415 98.7 °F (37.1 °C) 80 16 137/72 96 %   05/08/18 2102 98.1 °F (36.7 °C) 89 16 116/68 98 %   05/08/18 1927 - 82 16 - 100 %   05/08/18 1507 - - - - 98 %   05/08/18 1426 98.4 °F (36.9 °C) 81 16 103/56 100 %        Gen: nad  Chest: bilateral breath sounds present anteriorly  Abd- soft non tender  Cardiac: rrr  Abd: s/nt    -Labs-    Recent Labs      05/09/18   0530  05/08/18   0316  05/07/18   0217   WBC  7.9  8.6  9.7   HGB  9.2*  8.7*  9.0*   PLT  214  216  216   ANEU  5.7  6.0  6.9   NA  136  135*  135*   K  4.0  3.6  3.7   GLU  127*  89  97   BUN  16  20  21*   CREA  0.85  0.79  0.81   ALT  19  18  20   SGOT  32  29  37   TBILI  0.4  0.5  0.6   AP  488*  430*  396*   CA  8.2*  7.8*  8.2*   MG   --   1.7   --    PHOS   --    --   3.2       -Assessment + Plan-     *) metastatic breast cancer. ER+, WA+, her-2 neg; bone scan: diffuse mets  ----- continue  Arimidex (hormonal AI therapy) started on 4/24  *) Anemia: suspect AOCD +- marrow involvement,  started procrit weekly on 4/23  ---- prbc x1 4/23, 4/26 . Stable at 9 today. *) CHF. Uli Lindquist Per cardiology. .  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  Required bronchoscopy to clear secretions again 5/1  ----- had thoracentesis on 5/5, cytology is benign      Amber Jackson MD

## 2018-05-09 NOTE — PROGRESS NOTES
Worsening left sided pleural effusion , I have consulted thoracic surgery for their opinion , as cytology from previous tap was negative .

## 2018-05-09 NOTE — PROGRESS NOTES
PCCM:    Pleural fluid path without neoplasia. Chest film yesterday with marked improvement from last week. If there is a rapid reaccumulation it is reasonable to consider pleural catheter. Oncology is involved, we will defer to them for final call. We will be available again to see if needed.     Awa Severino PA-C

## 2018-05-09 NOTE — PROGRESS NOTES
[facility]    Subjective     Doing well, tolerating GI lite diet, no N/V, no abdominal pain    Objective     Patient Vitals for the past 24 hrs:   Temp Pulse Resp BP SpO2   05/09/18 0415 98.7 °F (37.1 °C) 80 16 137/72 96 %   05/08/18 2102 98.1 °F (36.7 °C) 89 16 116/68 98 %   05/08/18 1927 - 82 16 - 100 %   05/08/18 1507 - - - - 98 %   05/08/18 1426 98.4 °F (36.9 °C) 81 16 103/56 100 %   05/08/18 0850 - - - - 100 %   05/08/18 0838 99.1 °F (37.3 °C) 85 16 124/66 100 %         Date 05/08/18 0700 - 05/09/18 0659 05/09/18 0700 - 05/10/18 0659   Shift 5944-7205 1261-5267 24 Hour Total 7474-8720 1636-0998 24 Hour Total   I  N  T  A  K  E   P.O. 2640  2640         P. O. 2640  2640       Shift Total  (mL/kg) 2640  (41.9)  2640  (40.6)      O  U  T  P  U  T   Urine  (mL/kg/hr) 1400  (1.9) 1650  (2.1) 3050  (2)         Urine Voided 800 1650 2450         Urine Output (mL) (External Female Catheter 04/30/18) 600  600       Shift Total  (mL/kg) 1400  (22.2) 1650  (25.4) 3050  (47)      NET 1240 -1650 -410      Weight (kg) 63 65 65 65 65 65       PE  Pulm - CTAB  CV - RRR  Abd - soft, mild distention, BS present, NTTP    Labs  Recent Results (from the past 12 hour(s))   GLUCOSE, POC    Collection Time: 05/08/18  9:55 PM   Result Value Ref Range    Glucose (POC) 156 (H) 65 - 100 mg/dL    Performed by [address] [place] [address]    Collection Time: 05/09/18  5:30 AM   Result Value Ref Range    Sodium 136 136 - 145 mmol/L    Potassium 4.0 3.5 - 5.1 mmol/L    Chloride 101 97 - 108 mmol/L    CO2 26 21 - 32 mmol/L    Anion gap 9 5 - 15 mmol/L    Glucose 127 (H) 65 - 100 mg/dL    BUN 16 6 - 20 MG/DL    Creatinine 0.85 0.55 - 1.02 MG/DL    BUN/Creatinine ratio 19 12 - 20      GFR est AA >60 >60 ml/min/1.73m2    GFR est non-AA >60 >60 ml/min/1.73m2    Calcium 8.2 (L) 8.5 - 10.1 MG/DL    Bilirubin, total 0.4 0.2 - 1.0 MG/DL    ALT (SGPT) 19 12 - 78 U/L AST (SGOT) 32 15 - 37 U/L    Alk. phosphatase 488 (H) 45 - 117 U/L    Protein, total 6.5 6.4 - 8.2 g/dL    Albumin 1.9 (L) 3.5 - 5.0 g/dL    Globulin 4.6 (H) 2.0 - 4.0 g/dL    A-G Ratio 0.4 (L) 1.1 - 2.2     CBC WITH AUTOMATED DIFF    Collection Time: 05/09/18  5:30 AM   Result Value Ref Range    WBC 7.9 3.6 - 11.0 K/uL    RBC 3.23 (L) 3.80 - 5.20 M/uL    HGB 9.2 (L) 11.5 - 16.0 g/dL    HCT 29.2 (L) 35.0 - 47.0 %    MCV 90.4 80.0 - 99.0 FL    MCH 28.5 26.0 - 34.0 PG    MCHC 31.5 30.0 - 36.5 g/dL    RDW 21.1 (H) 11.5 - 14.5 %    PLATELET 593 190 - 808 K/uL    MPV 10.2 8.9 - 12.9 FL    NRBC 0.0 0  WBC    ABSOLUTE NRBC 0.00 0.00 - 0.01 K/uL    NEUTROPHILS 71 32 - 75 %    LYMPHOCYTES 12 12 - 49 %    MONOCYTES 12 5 - 13 %    EOSINOPHILS 3 0 - 7 %    BASOPHILS 1 0 - 1 %    IMMATURE GRANULOCYTES 1 (H) 0.0 - 0.5 %    ABS. NEUTROPHILS 5.7 1.8 - 8.0 K/UL    ABS. LYMPHOCYTES 0.9 0.8 - 3.5 K/UL    ABS. MONOCYTES 0.9 0.0 - 1.0 K/UL    ABS. EOSINOPHILS 0.2 0.0 - 0.4 K/UL    ABS. BASOPHILS 0.1 0.0 - 0.1 K/UL    ABS. IMM. GRANS. 0.1 (H) 0.00 - 0.04 K/UL    DF MANUAL      PLATELET COMMENTS Large Platelets      RBC COMMENTS ANISOCYTOSIS  2+        RBC COMMENTS OVALOCYTES  PRESENT        WBC COMMENTS HYPERSEGMENTED POLYS     GLUCOSE, POC    Collection Time: 05/09/18  6:36 AM   Result Value Ref Range    Glucose (POC) 124 (H) 65 - 100 mg/dL    Performed by Casey Madden is a 82 y. o.yr old female with resolved SBO    Plan     Doing better now passing flatus but no BM  Continue GI lite diet  Start miralax for BM  I will sign off, please call if any questions or issues    Emilie Ho MD

## 2018-05-10 LAB
ALBUMIN SERPL-MCNC: 1.8 G/DL (ref 3.5–5)
ALBUMIN/GLOB SERPL: 0.4 {RATIO} (ref 1.1–2.2)
ALP SERPL-CCNC: 525 U/L (ref 45–117)
ALT SERPL-CCNC: 18 U/L (ref 12–78)
ANION GAP SERPL CALC-SCNC: 6 MMOL/L (ref 5–15)
AST SERPL-CCNC: 31 U/L (ref 15–37)
BASOPHILS # BLD: 0.1 K/UL (ref 0–0.1)
BASOPHILS NFR BLD: 1 % (ref 0–1)
BILIRUB SERPL-MCNC: 0.4 MG/DL (ref 0.2–1)
BUN SERPL-MCNC: 18 MG/DL (ref 6–20)
BUN/CREAT SERPL: 20 (ref 12–20)
CALCIUM SERPL-MCNC: 8.2 MG/DL (ref 8.5–10.1)
CHLORIDE SERPL-SCNC: 103 MMOL/L (ref 97–108)
CO2 SERPL-SCNC: 28 MMOL/L (ref 21–32)
CREAT SERPL-MCNC: 0.9 MG/DL (ref 0.55–1.02)
DIFFERENTIAL METHOD BLD: ABNORMAL
EOSINOPHIL # BLD: 0.2 K/UL (ref 0–0.4)
EOSINOPHIL NFR BLD: 3 % (ref 0–7)
ERYTHROCYTE [DISTWIDTH] IN BLOOD BY AUTOMATED COUNT: 20.8 % (ref 11.5–14.5)
GLOBULIN SER CALC-MCNC: 4.6 G/DL (ref 2–4)
GLUCOSE BLD STRIP.AUTO-MCNC: 125 MG/DL (ref 65–100)
GLUCOSE BLD STRIP.AUTO-MCNC: 166 MG/DL (ref 65–100)
GLUCOSE BLD STRIP.AUTO-MCNC: 204 MG/DL (ref 65–100)
GLUCOSE BLD STRIP.AUTO-MCNC: 225 MG/DL (ref 65–100)
GLUCOSE BLD STRIP.AUTO-MCNC: 275 MG/DL (ref 65–100)
GLUCOSE SERPL-MCNC: 156 MG/DL (ref 65–100)
HCT VFR BLD AUTO: 28.4 % (ref 35–47)
HGB BLD-MCNC: 8.9 G/DL (ref 11.5–16)
IMM GRANULOCYTES # BLD: 0 K/UL (ref 0–0.04)
IMM GRANULOCYTES NFR BLD AUTO: 0 % (ref 0–0.5)
LYMPHOCYTES # BLD: 1.4 K/UL (ref 0.8–3.5)
LYMPHOCYTES NFR BLD: 20 % (ref 12–49)
MCH RBC QN AUTO: 28.5 PG (ref 26–34)
MCHC RBC AUTO-ENTMCNC: 31.3 G/DL (ref 30–36.5)
MCV RBC AUTO: 91 FL (ref 80–99)
MONOCYTES # BLD: 0.9 K/UL (ref 0–1)
MONOCYTES NFR BLD: 13 % (ref 5–13)
NEUTS SEG # BLD: 4.3 K/UL (ref 1.8–8)
NEUTS SEG NFR BLD: 63 % (ref 32–75)
NRBC # BLD: 0 K/UL (ref 0–0.01)
NRBC BLD-RTO: 0 PER 100 WBC
PLATELET # BLD AUTO: 219 K/UL (ref 150–400)
PLATELET COMMENTS,PCOM: ABNORMAL
PMV BLD AUTO: 10.3 FL (ref 8.9–12.9)
POTASSIUM SERPL-SCNC: 4.2 MMOL/L (ref 3.5–5.1)
PROT SERPL-MCNC: 6.4 G/DL (ref 6.4–8.2)
RBC # BLD AUTO: 3.12 M/UL (ref 3.8–5.2)
RBC MORPH BLD: ABNORMAL
SERVICE CMNT-IMP: ABNORMAL
SODIUM SERPL-SCNC: 137 MMOL/L (ref 136–145)
WBC # BLD AUTO: 6.9 K/UL (ref 3.6–11)

## 2018-05-10 PROCEDURE — 74011250636 HC RX REV CODE- 250/636: Performed by: INTERNAL MEDICINE

## 2018-05-10 PROCEDURE — 74011250637 HC RX REV CODE- 250/637: Performed by: INTERNAL MEDICINE

## 2018-05-10 PROCEDURE — 74011250637 HC RX REV CODE- 250/637: Performed by: NURSE PRACTITIONER

## 2018-05-10 PROCEDURE — 94640 AIRWAY INHALATION TREATMENT: CPT

## 2018-05-10 PROCEDURE — 74011250637 HC RX REV CODE- 250/637: Performed by: HOSPITALIST

## 2018-05-10 PROCEDURE — 97530 THERAPEUTIC ACTIVITIES: CPT

## 2018-05-10 PROCEDURE — 97116 GAIT TRAINING THERAPY: CPT

## 2018-05-10 PROCEDURE — 74011000250 HC RX REV CODE- 250: Performed by: INTERNAL MEDICINE

## 2018-05-10 PROCEDURE — 74011250636 HC RX REV CODE- 250/636: Performed by: HOSPITALIST

## 2018-05-10 PROCEDURE — 82962 GLUCOSE BLOOD TEST: CPT

## 2018-05-10 PROCEDURE — 74011636637 HC RX REV CODE- 636/637: Performed by: HOSPITALIST

## 2018-05-10 PROCEDURE — 74011000250 HC RX REV CODE- 250: Performed by: HOSPITALIST

## 2018-05-10 PROCEDURE — 74011250637 HC RX REV CODE- 250/637: Performed by: SURGERY

## 2018-05-10 PROCEDURE — 36415 COLL VENOUS BLD VENIPUNCTURE: CPT | Performed by: HOSPITALIST

## 2018-05-10 PROCEDURE — 65660000000 HC RM CCU STEPDOWN

## 2018-05-10 PROCEDURE — 80053 COMPREHEN METABOLIC PANEL: CPT | Performed by: HOSPITALIST

## 2018-05-10 PROCEDURE — 74011250637 HC RX REV CODE- 250/637: Performed by: FAMILY MEDICINE

## 2018-05-10 PROCEDURE — 85025 COMPLETE CBC W/AUTO DIFF WBC: CPT | Performed by: HOSPITALIST

## 2018-05-10 RX ORDER — DOCUSATE SODIUM 100 MG/1
100 CAPSULE, LIQUID FILLED ORAL 2 TIMES DAILY
Status: DISCONTINUED | OUTPATIENT
Start: 2018-05-10 | End: 2018-05-11 | Stop reason: HOSPADM

## 2018-05-10 RX ORDER — ACETAMINOPHEN 325 MG/1
650 TABLET ORAL
Status: DISCONTINUED | OUTPATIENT
Start: 2018-05-10 | End: 2018-05-11 | Stop reason: HOSPADM

## 2018-05-10 RX ORDER — FACIAL-BODY WIPES
10 EACH TOPICAL ONCE
Status: COMPLETED | OUTPATIENT
Start: 2018-05-10 | End: 2018-05-10

## 2018-05-10 RX ADMIN — POLYETHYLENE GLYCOL 3350 17 G: 17 POWDER, FOR SOLUTION ORAL at 09:37

## 2018-05-10 RX ADMIN — Medication 10 ML: at 07:24

## 2018-05-10 RX ADMIN — BUDESONIDE 500 MCG: 0.5 INHALANT RESPIRATORY (INHALATION) at 21:32

## 2018-05-10 RX ADMIN — LEVOTHYROXINE SODIUM 88 MCG: 88 TABLET ORAL at 07:21

## 2018-05-10 RX ADMIN — Medication 10 ML: at 07:21

## 2018-05-10 RX ADMIN — ACETYLCYSTEINE 200 MG: 200 SOLUTION ORAL; RESPIRATORY (INHALATION) at 21:31

## 2018-05-10 RX ADMIN — ASPIRIN 81 MG 81 MG: 81 TABLET ORAL at 09:39

## 2018-05-10 RX ADMIN — IPRATROPIUM BROMIDE AND ALBUTEROL SULFATE 3 ML: .5; 3 SOLUTION RESPIRATORY (INHALATION) at 21:31

## 2018-05-10 RX ADMIN — CARVEDILOL 6.25 MG: 6.25 TABLET, FILM COATED ORAL at 07:21

## 2018-05-10 RX ADMIN — Medication 10 ML: at 21:41

## 2018-05-10 RX ADMIN — THERA TABS 1 TABLET: TAB at 09:39

## 2018-05-10 RX ADMIN — DIBASIC SODIUM PHOSPHATE, MONOBASIC POTASSIUM PHOSPHATE AND MONOBASIC SODIUM PHOSPHATE 1 TABLET: 852; 155; 130 TABLET ORAL at 09:38

## 2018-05-10 RX ADMIN — INSULIN GLARGINE 10 UNITS: 100 INJECTION, SOLUTION SUBCUTANEOUS at 09:37

## 2018-05-10 RX ADMIN — ACETYLCYSTEINE 200 MG: 200 SOLUTION ORAL; RESPIRATORY (INHALATION) at 07:13

## 2018-05-10 RX ADMIN — INSULIN LISPRO 3 UNITS: 100 INJECTION, SOLUTION INTRAVENOUS; SUBCUTANEOUS at 18:08

## 2018-05-10 RX ADMIN — INSULIN LISPRO 3 UNITS: 100 INJECTION, SOLUTION INTRAVENOUS; SUBCUTANEOUS at 12:01

## 2018-05-10 RX ADMIN — BUDESONIDE 500 MCG: 0.5 INHALANT RESPIRATORY (INHALATION) at 07:12

## 2018-05-10 RX ADMIN — ANASTROZOLE 1 MG: 1 TABLET, COATED ORAL at 09:37

## 2018-05-10 RX ADMIN — ACETYLCYSTEINE 200 MG: 200 SOLUTION ORAL; RESPIRATORY (INHALATION) at 13:19

## 2018-05-10 RX ADMIN — DOCUSATE SODIUM 100 MG: 100 CAPSULE, LIQUID FILLED ORAL at 12:01

## 2018-05-10 RX ADMIN — LEVOFLOXACIN 750 MG: 5 INJECTION, SOLUTION INTRAVENOUS at 16:14

## 2018-05-10 RX ADMIN — BISACODYL 10 MG: 10 SUPPOSITORY RECTAL at 17:58

## 2018-05-10 RX ADMIN — DIBASIC SODIUM PHOSPHATE, MONOBASIC POTASSIUM PHOSPHATE AND MONOBASIC SODIUM PHOSPHATE 1 TABLET: 852; 155; 130 TABLET ORAL at 18:08

## 2018-05-10 RX ADMIN — IPRATROPIUM BROMIDE AND ALBUTEROL SULFATE 3 ML: .5; 3 SOLUTION RESPIRATORY (INHALATION) at 07:12

## 2018-05-10 RX ADMIN — Medication 10 ML: at 21:42

## 2018-05-10 RX ADMIN — SPIRONOLACTONE 25 MG: 25 TABLET, FILM COATED ORAL at 09:38

## 2018-05-10 RX ADMIN — Medication 10 ML: at 16:13

## 2018-05-10 RX ADMIN — DOCUSATE SODIUM 100 MG: 100 CAPSULE, LIQUID FILLED ORAL at 18:08

## 2018-05-10 RX ADMIN — CARVEDILOL 6.25 MG: 6.25 TABLET, FILM COATED ORAL at 18:08

## 2018-05-10 RX ADMIN — LISINOPRIL 2.5 MG: 5 TABLET ORAL at 09:38

## 2018-05-10 RX ADMIN — IPRATROPIUM BROMIDE AND ALBUTEROL SULFATE 3 ML: .5; 3 SOLUTION RESPIRATORY (INHALATION) at 13:19

## 2018-05-10 RX ADMIN — ACETAMINOPHEN 650 MG: 325 TABLET, FILM COATED ORAL at 23:04

## 2018-05-10 RX ADMIN — FUROSEMIDE 20 MG: 20 TABLET ORAL at 09:38

## 2018-05-10 RX ADMIN — Medication 1 CAPSULE: at 09:38

## 2018-05-10 RX ADMIN — Medication 400 MG: at 09:39

## 2018-05-10 RX ADMIN — INSULIN LISPRO 3 UNITS: 100 INJECTION, SOLUTION INTRAVENOUS; SUBCUTANEOUS at 21:40

## 2018-05-10 NOTE — PROGRESS NOTES
Hospitalist Progress Note  Janes Sandhu MD  Answering service: [phone] OR [phone] from in house phone        Date of Service:  5/10/2018  NAME:  Ludivina Cee  :  1935  MRN:  [id number]      Admission Summary:   The patient is an 82-year-old female with history of diabetes and rheumatoid arthritis who initially presented to the emergency room via EMS with complaints of shortness of breath    Interval history / Subjective:     Patient feels good today   No sob and not on oxygen , does have crackles left base   Thoracic surgery opinion sorted for the effusion , though cytology is negative     Patient will be discharged tomorrow . Waiting for BM , given laxatives       Assessment & Plan:     Acute on chronic hypoxic/hypercarbic respiratory failure due to pulmonary edema, pneumonia  -s/p bronchoscopy on  and on  suctioned and cleared  -respiratory culture grow on  and  grow candida albicans, on diflucan which is stopped   -,resp distress and CXR worsening worsening of near complete left hemithorax opacification.  -On and off BIPAP  -S/p bronch and clearing of secretion on   -CXR shows Increased bilateral pleural effusions and lung opacities resulting in near  complete opacification of the left hemithorax. - S/p thoracentesis  - Consider pleurx drain if recurs   - Oxygen saturation is stable  Cytology is negative for malignancy . Acute encephalopathy possible due to metabolic. Resolved  -CT head on  volume loss and minimal white matter disease. No acute intracranial [place], Sinus mucosal inflammatory change  -MRI of brain  Study limited by motion artifact, volume loss and white matter disease. No definite intracranial metastasis however no intravenous contrast was administered due to poor renal function. 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   - continue supportive care    Sepsis secondary to E Coli UTI, pneumonia  POA  -urine cx  4/19 grow e coli, pseudomanas sp  -blood cx on 4/18, 4/19 and 4/22 no growth  Antinfectives  -Ceftriaxone 1 dose on 4/19  -Diflucan 4/26-present  -Levaquin 4/24-present  -Meropenem 4/24-present  -Zosyn 4/19-4/24  -Vancomycin 4/19-4/28  - Antibiotics management as per Infectious disease. NSTEMI  - on aspirin,coreg. Statin intolerant,she is on fish oil. She was getting Evolocumab injections out patient.  -cardiologist on board patient refused cath .will be done outpatient     Acute systolic CHF NYHA Class IV  -Intermittent diuresis,coreg   Systolic function  was mildly reduced. Ejection fraction was estimated to be 45 %. There was  mild hypokinesis of the basal-mid anteroseptal and apical septal wall(s). -cardiology following    JEROD on CKD stage III,creatinien improved  -creatinine improved and stabilized. Increase Lasix to 40 mg daily, now back to 20 daily   -nephrologist on board    Metastatic left breast cancer with diffuse bone mets.+er,+Pr  -on armidex   -Hem/Onc on board    HTN  -BP stablen , coreg monitor BP. Added low dose lisinopril 5/3    DM-II  -Blood glucose 170-130s. She off the oral medications(Glimepiride,sitagliptin and metformin) she was taking PTA  -Add low dose Lantus 8 units sc daily,continue insulin sliding scale, monitor finger stick glucose    Hypothyroidism  -continue synthroid    Hx of GERD  -continue pepcid    Elevated liver enzyme   -possible related to liver lesion, monitor LFT    Anemia multifactorial  -Hgb 6.7, s/p 2 units pRBC on 4/26,   -Hgb 9.3, monitor H/H    Hx of colon cancer 25 years ago  -according to son, there is a work-up planned with [facility] regarding the possibility of recurrent CA (based on lesions seen in the calvarium on MRI - 3/19). -hem/onc on board    H/o stroke with residual weakness on the left    Diet,suspect silent aspiration. Hypomagnesemia: iv mg today,on oral magnesium.     H/O Seropositive RA of multiple sites,stable. -Apparently she was getting Etanercept sc injections out patient. Debility: PT/OT requested. She likely needs rehab on discharge    Partial SBO   GI lite diet today  SBO seems to be resolved  Continue current care  miralax       WILL TALK TO ID TO SEE IF WE CAN TAPER DOWN ANTIBIOTICS AS C/S NGTD   Will discuss with Dr Jeffry Torres . Code status: DNR  DVT prophylaxis:SCD    Care Plan discussed with: Patient/Family and Nurse  Disposition:in ICU    Contact sonJosie . if needed      Hospital Problems  Date Reviewed: 4/16/2018          Codes Class Noted POA    Acute systolic heart failure ([facility] [address] ICD-10-CM: I50.21  ICD-9-CM: 428.21  4/24/2018 Unknown        Altered mental status, unspecified ICD-10-CM: R41.82  ICD-9-CM: 780.97  4/23/2018 Unknown        * (Principal)SOB (shortness of breath) ICD-10-CM: R06.02  ICD-9-CM: 786.05  4/16/2018 Unknown                Vital Signs:    Last 24hrs VS reviewed since prior progress note. Most recent are:  Visit Vitals    /67 (BP 1 Location: Left arm, BP Patient Position: At rest)    Pulse 80    Temp 97.9 °F (36.6 °C)    Resp 18    Ht 5' 1\" (1.549 m)    Wt 60.2 kg (132 lb 12.8 oz)    SpO2 99%    BMI 25.09 kg/m2         Intake/Output Summary (Last 24 hours) at 05/10/18 1806  Last data filed at 05/10/18 0106   Gross per 24 hour   Intake              420 ml   Output                0 ml   Net              420 ml        Physical Examination:             Constitutional:  Alert and conversant,on nasal canula   ENT:  Oral mucous moist, oropharynx benign. Neck supple,    Resp:  Decrease bronchial breath sound, few wheezing and rhonic bilaterally. No accessory muscle use   CV:  Regular rhythm, normal rate, no murmurs, gallops, rubs    GI:  Soft, non distended, non tender.  normoactive bowel sounds, no hepatosplenomegaly     Musculoskeletal:  No edema    Neurologic:  Conscious and alert, oriented to place and person, answer questions, moves all extremities. Left side weak     Skin:  Good turgor, no rashes or ulcers       Data Review:    Review and/or order of clinical lab test      Labs:     Recent Labs      05/10/18   0335  05/09/18   0530   WBC  6.9  7.9   HGB  8.9*  9.2*   HCT  28.4*  29.2*   PLT  219  214     Recent Labs      05/10/18   0335  05/09/18   0530  05/08/18 0316   NA  137  136  135*   K  4.2  4.0  3.6   CL  103  101  98   CO2  28  26  29   BUN  18  16  20   CREA  0.90  0.85  0.79   GLU  156*  127*  89   CA  8.2*  8.2*  7.8*   MG   --    --   1.7     Recent Labs      05/10/18   0335  05/09/18   0530  05/08/18   0316   SGOT  31  32  29   ALT  18  19  18   AP  525*  488*  430*   TBILI  0.4  0.4  0.5   TP  6.4  6.5  6.1*   ALB  1.8*  1.9*  1.7*   GLOB  4.6*  4.6*  4.4*     No results for input(s): INR, PTP, APTT in the last 72 hours. No lab exists for component: INREXT, INREXT   No results for input(s): FE, TIBC, PSAT, FERR in the last 72 hours. No results found for: FOL, RBCF   No results for input(s): PH, PCO2, PO2 in the last 72 hours. No results for input(s): CPK, CKNDX, TROIQ in the last 72 hours.     No lab exists for component: CPKMB  Lab Results   Component Value Date/Time    Cholesterol, total 74 04/16/2018 04:21 AM    HDL Cholesterol 25 04/16/2018 04:21 AM    LDL, calculated 31.6 04/16/2018 04:21 AM    Triglyceride 87 04/16/2018 04:21 AM    CHOL/HDL Ratio 3.0 04/16/2018 04:21 AM     Lab Results   Component Value Date/Time    Glucose (POC) 225 (H) 05/10/2018 04:26 PM    Glucose (POC) 204 (H) 05/10/2018 11:23 AM    Glucose (POC) 125 (H) 05/10/2018 06:00 AM    Glucose (POC) 166 (H) 05/10/2018 02:29 AM    Glucose (POC) 212 (H) 05/09/2018 10:32 PM     Lab Results   Component Value Date/Time    Color YELLOW/STRAW 04/20/2018 04:30 AM    Appearance TURBID (A) 04/20/2018 04:30 AM    Specific gravity 1.029 04/20/2018 04:30 AM    pH (UA) 5.0 04/20/2018 04:30 AM    Protein 100 (A) 04/20/2018 04:30 AM    Glucose NEGATIVE  04/20/2018 04:30 AM Ketone NEGATIVE  04/20/2018 04:30 AM    Bilirubin NEGATIVE  04/20/2018 04:30 AM    Urobilinogen 0.2 04/20/2018 04:30 AM    Nitrites NEGATIVE  04/20/2018 04:30 AM    Leukocyte Esterase LARGE (A) 04/20/2018 04:30 AM    Epithelial cells FEW 04/20/2018 04:30 AM    Bacteria 3+ (A) 04/20/2018 04:30 AM    WBC >100 (H) 04/20/2018 04:30 AM    RBC 5-10 04/20/2018 04:30 AM         Medications Reviewed:     Current Facility-Administered Medications   Medication Dose Route Frequency    docusate sodium (COLACE) capsule 100 mg  100 mg Oral BID    polyethylene glycol (MIRALAX) packet 17 g  17 g Oral DAILY    furosemide (LASIX) tablet 20 mg  20 mg Oral DAILY    levothyroxine (SYNTHROID) tablet 88 mcg  88 mcg Oral 6am    albuterol-ipratropium (DUO-NEB) 2.5 MG-0.5 MG/3 ML  3 mL Nebulization TID RT    insulin glargine (LANTUS) injection 10 Units  10 Units SubCUTAneous DAILY    lisinopril (PRINIVIL, ZESTRIL) tablet 2.5 mg  2.5 mg Oral DAILY    phosphorus (K PHOS NEUTRAL) 250 mg tablet 1 Tab  1 Tab Oral BID    acetylcysteine (MUCOMYST) 200 mg/mL (20 %) solution 200 mg  200 mg Nebulization TID RT    lactobac ac& pc-s.therm-b.anim (BERYL Q/RISAQUAD)  1 Cap Oral DAILY    naloxone (NARCAN) injection 0.4 mg  0.4 mg IntraVENous Multiple    flumazenil (ROMAZICON) 0.1 mg/mL injection 0.2 mg  0.2 mg IntraVENous Multiple    midazolam (VERSED) injection 0.25-5 mg  0.25-5 mg IntraVENous Multiple    carvedilol (COREG) tablet 6.25 mg  6.25 mg Oral BID WITH MEALS    aspirin chewable tablet 81 mg  81 mg Oral DAILY    spironolactone (ALDACTONE) tablet 25 mg  25 mg Oral DAILY    acetaminophen (TYLENOL) suppository 650 mg  650 mg Rectal Q4H PRN    sodium chloride (NS) flush 5-10 mL  5-10 mL IntraVENous Q8H    sodium chloride (NS) flush 5-10 mL  5-10 mL IntraVENous PRN    budesonide (PULMICORT) 500 mcg/2 ml nebulizer suspension  500 mcg Nebulization BID RT    hydrALAZINE (APRESOLINE) 20 mg/mL injection 10 mg  10 mg IntraVENous Q6H PRN    0.9% sodium chloride infusion 250 mL  250 mL IntraVENous PRN    sodium chloride (NS) flush 20 mL  20 mL InterCATHeter PRN    sodium chloride (NS) flush 10 mL  10 mL InterCATHeter Q24H    sodium chloride (NS) flush 10 mL  10 mL InterCATHeter PRN    sodium chloride (NS) flush 10 mL  10 mL InterCATHeter Q8H    alteplase (CATHFLO) 1 mg in sterile water (preservative free) 1 mL injection  1 mg InterCATHeter PRN    bacitracin 500 unit/gram packet 1 Packet  1 Packet Topical PRN    sodium chloride (NS) flush 10-30 mL  10-30 mL InterCATHeter PRN    sodium chloride (NS) flush 10 mL  10 mL InterCATHeter Q24H    sodium chloride (NS) flush 10 mL  10 mL InterCATHeter PRN    sodium chloride (NS) flush 10-40 mL  10-40 mL InterCATHeter Q8H    sodium chloride (NS) flush 20 mL  20 mL InterCATHeter Q24H    anastrozole (ARIMIDEX) tablet 1 mg  1 mg Oral DAILY    epoetin celio (EPOGEN;PROCRIT) injection 10,000 Units  10,000 Units SubCUTAneous Q MON, WED & FRI    albuterol-ipratropium (DUO-NEB) 2.5 MG-0.5 MG/3 ML  3 mL Nebulization Q6H PRN    glucose chewable tablet 16 g  4 Tab Oral PRN    dextrose (D50W) injection syrg 12.5-25 g  12.5-25 g IntraVENous PRN    glucagon (GLUCAGEN) injection 1 mg  1 mg IntraMUSCular PRN    insulin lispro (HUMALOG) injection   SubCUTAneous AC&HS    prochlorperazine (COMPAZINE) with saline injection 5 mg  5 mg IntraVENous Q8H PRN    magnesium oxide (MAG-OX) tablet 400 mg  400 mg Oral DAILY    therapeutic multivitamin (THERAGRAN) tablet 1 Tab  1 Tab Oral DAILY    sodium chloride (NS) flush 5-10 mL  5-10 mL IntraVENous Q8H    sodium chloride (NS) flush 5-10 mL  5-10 mL IntraVENous PRN    ondansetron (ZOFRAN) injection 4 mg  4 mg IntraVENous Q4H PRN     ______________________________________________________________________  EXPECTED LENGTH OF STAY: 4d 21h  ACTUAL LENGTH OF STAY:          24                 Evette Thomas MD

## 2018-05-10 NOTE — ROUTINE PROCESS
Bedside and Verbal shift change report given to ARIANA Jorge (oncoming nurse) by Juan Jose Ponce RN (offgoing nurse). Report included the following information SBAR, Kardex and MAR.

## 2018-05-10 NOTE — DIABETES MGMT
DTC Progress Note    Recommendations/ Comments:Pt is in and out of glucose target of < 180 mg/dl. Pt has required 8 units of correction insulin in past 24 hours. Pt may benefit from an increase in basal insulin to 12 units. Current hospital DM medication: lispro insulin correction scale and lantus 10 units dakly    Chart reviewed on Sandy Ledesma. Patient is a 82 y.o. female with known  Type 2 Diabetes on oral agents (triple therapy):    Prior to admission medications for glucose management: per past medical records  -Amaryl 1mg every morning  -Januvia 50mg daily  -Metformin 1000mg twice a day with meals     A1c:   Lab Results   Component Value Date/Time    Hemoglobin A1c 7.0 (H) 04/18/2018 08:14 AM    Hemoglobin A1c 6.9 (H) 11/30/2011 08:30 AM       Recent Glucose Results: Lab Results   Component Value Date/Time     (H) 05/10/2018 03:35 AM    GLUCPOC 204 (H) 05/10/2018 11:23 AM    GLUCPOC 125 (H) 05/10/2018 06:00 AM    GLUCPOC 166 (H) 05/10/2018 02:29 AM        Lab Results   Component Value Date/Time    Creatinine 0.90 05/10/2018 03:35 AM     Estimated Creatinine Clearance: 40.9 mL/min (based on Cr of 0.9). Active Orders   Diet    DIET REGULAR 2 GM NA (House Low NA); Low Fiber        PO intake: Patient Vitals for the past 72 hrs:   % Diet Eaten   05/09/18 1817 75 %   05/09/18 1253 50 %   05/08/18 1426 50 %   05/08/18 1022 75 %   05/07/18 1730 75 %       Will continue to follow as needed.     Thank you

## 2018-05-10 NOTE — PROGRESS NOTES
ID Progress Note  5/10/2018    Subjective:     She remains weak. Objective:     Antibiotics:  1. Merrem  2. Levaquin  3. Fluconazole      Vitals:   Visit Vitals    /67 (BP 1 Location: Left arm, BP Patient Position: At rest)    Pulse 80    Temp 97.9 °F (36.6 °C)    Resp 18    Ht 5' 1\" (1.549 m)    Wt 60.2 kg (132 lb 12.8 oz)    SpO2 99%    BMI 25.09 kg/m2        Tmax:  Temp (24hrs), Av °F (36.7 °C), Min:97.5 °F (36.4 °C), Max:98.4 °F (36.9 °C)      Exam:  Lungs:  rhonchi R anterior  Heart:  regular rate and rhythm  Abdomen:  soft, non-tender. Bowel sounds normal. No masses,  no organomegaly  Skin:  no rash or abnormalities    Labs:      Recent Labs      05/10/18   0335  18   0530  18   0316   WBC  6.9  7.9  8.6   HGB  8.9*  9.2*  8.7*   PLT  219  214  216   BUN  18  16  20   CREA  0.90  0.85  0.79   SGOT  31  32  29   AP  525*  488*  430*   TBILI  0.4  0.4  0.5       Cultures:     No results found for: SDES  Lab Results   Component Value Date/Time    Culture result: NO GROWTH 4 DAYS 2018 11:05 AM    Culture result: NO FUNGUS ISOLATED 2 DAYS 2018 11:05 AM    Culture result: LIGHT YEAST (A) 2018 11:30 AM    Culture result: LIGHT NORMAL RESPIRATORY BERYL 2018 11:30 AM    Culture result: HEAVY CANDIDA ALBICANS (A) 2018 11:30 AM    Culture result:  2018 11:30 AM     CULTURE WILL BE HELD FOR 4 WEEKS. IF THERE IS ADDITIONAL FUNGAL GROWTH, A NEW REPORT WILL FOLLOW. Radiology: X ray with increased opacification on the left    Line/Insert Date:           Assessment:     1. Pneumonia  1. Thoracentesis today  2. Metastatic cancer  3. Debility  4. Candida from sputum (uncertain significance)  5. Leukocytosis--WBC up and down--again improved today    Objective:     1. Continue current therapy  2. Follow up pending cultures and studies  3. Aggressive pulmonary toilet  4. Home on oral Levaquin until  when discharged  5.  D/W son at bedside    Brandon Barkley Darcie Burroughs MD

## 2018-05-10 NOTE — PROGRESS NOTES
Problem: Mobility Impaired (Adult and Pediatric)  Goal: *Acute Goals and Plan of Care (Insert Text)  Physical Therapy Goals  Revised 5/7/2018  1. Patient will move from supine to sit and sit to supine, scoot up and down and roll side to side in bed with moderate assistance  within 7 day(s). 2.  Patient will transfer from bed to chair and chair to bed with moderate assistance  using the least restrictive device within 7 day(s). 3.  Patient will perform sit to stand with moderate assistance  within 7 day(s). 4.  Patient will ambulate with moderate assistance  for 15 feet with the least restrictive device within 7 day(s). 5.  Patient will tolerate OOB in chair x 30 minutes for improved activity tolerance within 7 days. Physical Therapy Goals  Initiated 4/20/2018  1. Patient will move from supine to sit and sit to supine , scoot up and down and roll side to side in bed with minimal assistance/contact guard assist within 7 day(s). 2.  Patient will transfer from bed to chair and chair to bed with minimal assistance/contact guard assist using the least restrictive device within 7 day(s). 3.  Patient will perform sit to stand with minimal assistance/contact guard assist within 7 day(s). 4.  Patient will ambulate with minimal assistance/contact guard assist for 25 feet with the least restrictive device within 7 day(s). 5.  Patient will ascend/descend 3 stairs with 2 handrail(s) with minimal assistance/contact guard assist within 7 day(s). physical Therapy TREATMENT  Patient: Danielle Escalera (83 y.o. female)  Date: 5/10/2018  Diagnosis: SOB (shortness of breath)  colon  Anemia  COLON SOB (shortness of breath)    14 Days Post-Op  Precautions: Fall, DNR  Chart, physical therapy assessment, plan of care and goals were reviewed. ASSESSMENT:  Pt mobility showing progressing this session however pt continues to demo decreased activity tolerance.  Able to transfer to EOB from supine (HOB elevated) w/ Min A at shoulders to achieve upright position (pt also using bed rail to assist w/initiation of transfer. Pt sit<> stand w/ CGA-Min A (Min A for sitting as pt demos poor eccentric control). Pt CGA-Min A for bed>chair transfer using walker (standard). Pt fatigued after transfer to chair , brief rest break required. Completed seated BU/LE exercises w/ tactile cues for scapular retraction. Pt reported she had been up to chair once this AM; encouraged pt to continue to be OOB>chair at least 3x/day w/ all meals to continue increasing activity tolerance and strength for mobility. Progression toward goals:  [x]    Improving appropriately and progressing toward goals  []    Improving slowly and progressing toward goals  []    Not making progress toward goals and plan of care will be adjusted     PLAN:  Patient continues to benefit from skilled intervention to address the above impairments. Continue treatment per established plan of care. Discharge Recommendations:  Rehab, Home Health  Further Equipment Recommendations for Discharge:  TBD     SUBJECTIVE:   Patient stated That chair looks so far away.     OBJECTIVE DATA SUMMARY:   Critical Behavior:  Neurologic State: Alert  Orientation Level: Oriented X4  Cognition: Follows commands  Safety/Judgement: Awareness of environment, Fall prevention, Home safety, Insight into deficits  Functional Mobility Training:  Bed Mobility:     Supine to Sit: Minimum assistance; Adaptive equipment; Additional time;Assist x1 (physical assist for shoulders upright(HOB elevated,bed rail))  Sit to Supine:  (pt remained OOB in chair)  Scooting: Contact guard assistance; Additional time;Assist x1        Transfers:  Sit to Stand: Minimum assistance; Adaptive equipment;Assist x1 (RW)  Stand to Sit: Contact guard assistance;Minimum assistance (poor eccentric control)                             Balance:  Sitting: Impaired  Sitting - Static: Fair (occasional)  Sitting - Dynamic: Fair (occasional)  Standing: Impaired; With support  Standing - Static: Constant support; Fair  Standing - Dynamic : Fair  Ambulation/Gait Training:  Distance (ft): 4 Feet (ft)  Assistive Device: Gait belt;Walker  Ambulation - Level of Assistance: Minimal assistance        Gait Abnormalities: Antalgic;Decreased step clearance;Shuffling gait        Base of Support: Widened     Speed/Haylee: Pace decreased (<100 feet/min); Shuffled  Step Length: Left shortened;Right shortened                                Therapeutic Exercises:   Seated: Hip flexion, LAQ's, heel-toe raises, hip ABD/ADD (pillow squeeze) , 2 x10  Pain:         No pain reported during this session. Activity Tolerance:   Limited. Pt fatiguing w/ bed mobility and bed>chair transfer. Please refer to the flowsheet for vital signs taken during this treatment.   After treatment:   [x]    Patient left in no apparent distress sitting up in chair  []    Patient left in no apparent distress in bed  [x]    Call bell left within reach  [x]    Nursing notified  [x]    Caregiver present  []    Bed alarm activated    COMMUNICATION/COLLABORATION:   The patients plan of care was discussed with: Registered Nurse    Marino Stratton PTA   Time Calculation: 17 mins

## 2018-05-10 NOTE — PROGRESS NOTES
Neema CHRISTOPHE TEJEDA [phone], [facility] is awaiting authorization from insurance company for admission to inpatient rehab. CM will continue to follow and assist with transportation to the facility if accepted. UPDATE 4:50 pm  Authorization secured from insurance for rehab-  Cm talked to son and he is in agreement with patient being discharged tomorrow. He said patient must have a bowel movement prior to being discharged. Cm talked with charge nurse about this and she will contact Dr Celestino Klein. CM arranged with [facility] for 11:30 am transport. CM will complete CM portion of Emtala. .     Nurse informed of arranged discharge-- Nurse to call report to [phone]. AVS, Mars and Kardex to be added to the envelope.

## 2018-05-10 NOTE — PROGRESS NOTES
Bedside and Verbal shift change report given to Panchito Lozano (oncoming nurse) by YOGI Childs RN (offgoing nurse). Report given with SBAR, Kardex, Intake/Output, MAR and Recent Results.

## 2018-05-11 VITALS
OXYGEN SATURATION: 97 % | HEART RATE: 77 BPM | HEIGHT: 61 IN | WEIGHT: 136 LBS | BODY MASS INDEX: 25.68 KG/M2 | RESPIRATION RATE: 16 BRPM | SYSTOLIC BLOOD PRESSURE: 115 MMHG | TEMPERATURE: 97.9 F | DIASTOLIC BLOOD PRESSURE: 57 MMHG

## 2018-05-11 LAB
ALBUMIN SERPL-MCNC: 1.9 G/DL (ref 3.5–5)
ALBUMIN/GLOB SERPL: 0.4 {RATIO} (ref 1.1–2.2)
ALP SERPL-CCNC: 580 U/L (ref 45–117)
ALT SERPL-CCNC: 20 U/L (ref 12–78)
ANION GAP SERPL CALC-SCNC: 9 MMOL/L (ref 5–15)
AST SERPL-CCNC: 28 U/L (ref 15–37)
BASOPHILS # BLD: 0.1 K/UL (ref 0–0.1)
BASOPHILS NFR BLD: 1 % (ref 0–1)
BILIRUB SERPL-MCNC: 0.4 MG/DL (ref 0.2–1)
BUN SERPL-MCNC: 19 MG/DL (ref 6–20)
BUN/CREAT SERPL: 23 (ref 12–20)
CALCIUM SERPL-MCNC: 8.3 MG/DL (ref 8.5–10.1)
CHLORIDE SERPL-SCNC: 101 MMOL/L (ref 97–108)
CO2 SERPL-SCNC: 26 MMOL/L (ref 21–32)
CREAT SERPL-MCNC: 0.82 MG/DL (ref 0.55–1.02)
DIFFERENTIAL METHOD BLD: ABNORMAL
EOSINOPHIL # BLD: 0.4 K/UL (ref 0–0.4)
EOSINOPHIL NFR BLD: 5 % (ref 0–7)
ERYTHROCYTE [DISTWIDTH] IN BLOOD BY AUTOMATED COUNT: 20.6 % (ref 11.5–14.5)
GLOBULIN SER CALC-MCNC: 4.4 G/DL (ref 2–4)
GLUCOSE BLD STRIP.AUTO-MCNC: 125 MG/DL (ref 65–100)
GLUCOSE SERPL-MCNC: 129 MG/DL (ref 65–100)
HCT VFR BLD AUTO: 28.8 % (ref 35–47)
HGB BLD-MCNC: 9.1 G/DL (ref 11.5–16)
IMM GRANULOCYTES # BLD: 0.1 K/UL (ref 0–0.04)
IMM GRANULOCYTES NFR BLD AUTO: 1 % (ref 0–0.5)
LYMPHOCYTES # BLD: 1.5 K/UL (ref 0.8–3.5)
LYMPHOCYTES NFR BLD: 21 % (ref 12–49)
MCH RBC QN AUTO: 28.5 PG (ref 26–34)
MCHC RBC AUTO-ENTMCNC: 31.6 G/DL (ref 30–36.5)
MCV RBC AUTO: 90.3 FL (ref 80–99)
MONOCYTES # BLD: 0.9 K/UL (ref 0–1)
MONOCYTES NFR BLD: 12 % (ref 5–13)
NEUTS SEG # BLD: 4.1 K/UL (ref 1.8–8)
NEUTS SEG NFR BLD: 60 % (ref 32–75)
NRBC # BLD: 0 K/UL (ref 0–0.01)
NRBC BLD-RTO: 0 PER 100 WBC
PLATELET # BLD AUTO: 224 K/UL (ref 150–400)
PLATELET COMMENTS,PCOM: ABNORMAL
PMV BLD AUTO: 10.3 FL (ref 8.9–12.9)
POTASSIUM SERPL-SCNC: 4.3 MMOL/L (ref 3.5–5.1)
PROT SERPL-MCNC: 6.3 G/DL (ref 6.4–8.2)
RBC # BLD AUTO: 3.19 M/UL (ref 3.8–5.2)
RBC MORPH BLD: ABNORMAL
SERVICE CMNT-IMP: ABNORMAL
SODIUM SERPL-SCNC: 136 MMOL/L (ref 136–145)
WBC # BLD AUTO: 7.1 K/UL (ref 3.6–11)

## 2018-05-11 PROCEDURE — 74011250637 HC RX REV CODE- 250/637: Performed by: SURGERY

## 2018-05-11 PROCEDURE — 74011000250 HC RX REV CODE- 250: Performed by: INTERNAL MEDICINE

## 2018-05-11 PROCEDURE — 74011250637 HC RX REV CODE- 250/637: Performed by: HOSPITALIST

## 2018-05-11 PROCEDURE — 85025 COMPLETE CBC W/AUTO DIFF WBC: CPT | Performed by: HOSPITALIST

## 2018-05-11 PROCEDURE — 36415 COLL VENOUS BLD VENIPUNCTURE: CPT | Performed by: HOSPITALIST

## 2018-05-11 PROCEDURE — 82962 GLUCOSE BLOOD TEST: CPT

## 2018-05-11 PROCEDURE — 74011000250 HC RX REV CODE- 250: Performed by: HOSPITALIST

## 2018-05-11 PROCEDURE — 74011250637 HC RX REV CODE- 250/637: Performed by: INTERNAL MEDICINE

## 2018-05-11 PROCEDURE — 74011250637 HC RX REV CODE- 250/637: Performed by: NURSE PRACTITIONER

## 2018-05-11 PROCEDURE — 74011636637 HC RX REV CODE- 636/637: Performed by: HOSPITALIST

## 2018-05-11 PROCEDURE — 74011250636 HC RX REV CODE- 250/636: Performed by: INTERNAL MEDICINE

## 2018-05-11 PROCEDURE — 94640 AIRWAY INHALATION TREATMENT: CPT

## 2018-05-11 PROCEDURE — 80053 COMPREHEN METABOLIC PANEL: CPT | Performed by: HOSPITALIST

## 2018-05-11 PROCEDURE — 77030018719 HC DRSG PTCH ANTIMIC J&J -A

## 2018-05-11 RX ORDER — SPIRONOLACTONE 25 MG/1
25 TABLET ORAL DAILY
Qty: 90 TAB | Refills: 0 | Status: SHIPPED | OUTPATIENT
Start: 2018-05-12 | End: 2018-06-06

## 2018-05-11 RX ORDER — POLYETHYLENE GLYCOL 3350 17 G/17G
17 POWDER, FOR SOLUTION ORAL DAILY
Qty: 1 EACH | Refills: 0 | Status: ON HOLD | OUTPATIENT
Start: 2018-05-12 | End: 2020-01-01

## 2018-05-11 RX ORDER — LEVOFLOXACIN 750 MG/1
750 TABLET ORAL ONCE
Status: COMPLETED | OUTPATIENT
Start: 2018-05-11 | End: 2018-05-11

## 2018-05-11 RX ORDER — CARVEDILOL 6.25 MG/1
6.25 TABLET ORAL 2 TIMES DAILY WITH MEALS
Qty: 90 TAB | Refills: 0 | Status: SHIPPED | OUTPATIENT
Start: 2018-05-11 | End: 2018-09-18 | Stop reason: SDUPTHER

## 2018-05-11 RX ORDER — FUROSEMIDE 20 MG/1
TABLET ORAL
Qty: 90 TAB | Refills: 0 | Status: SHIPPED | OUTPATIENT
Start: 2018-05-12 | End: 2018-06-21

## 2018-05-11 RX ORDER — LISINOPRIL 2.5 MG/1
2.5 TABLET ORAL DAILY
Qty: 90 TAB | Refills: 0 | Status: SHIPPED | OUTPATIENT
Start: 2018-05-12 | End: 2018-06-21

## 2018-05-11 RX ORDER — ANASTROZOLE 1 MG/1
1 TABLET ORAL DAILY
Qty: 90 TAB | Refills: 0 | Status: SHIPPED | OUTPATIENT
Start: 2018-05-12 | End: 2019-01-01 | Stop reason: CLARIF

## 2018-05-11 RX ORDER — DOCUSATE SODIUM 100 MG/1
100 CAPSULE, LIQUID FILLED ORAL 2 TIMES DAILY
Qty: 60 CAP | Refills: 2 | Status: SHIPPED | OUTPATIENT
Start: 2018-05-11 | End: 2018-08-09

## 2018-05-11 RX ORDER — LEVOFLOXACIN 750 MG/1
750 TABLET ORAL
Qty: 2 TAB | Refills: 0 | Status: SHIPPED | OUTPATIENT
Start: 2018-05-11 | End: 2018-05-16

## 2018-05-11 RX ADMIN — Medication 10 ML: at 07:32

## 2018-05-11 RX ADMIN — Medication 400 MG: at 08:49

## 2018-05-11 RX ADMIN — ACETYLCYSTEINE 200 MG: 200 SOLUTION ORAL; RESPIRATORY (INHALATION) at 08:55

## 2018-05-11 RX ADMIN — CARVEDILOL 6.25 MG: 6.25 TABLET, FILM COATED ORAL at 07:33

## 2018-05-11 RX ADMIN — IPRATROPIUM BROMIDE AND ALBUTEROL SULFATE 3 ML: .5; 3 SOLUTION RESPIRATORY (INHALATION) at 08:55

## 2018-05-11 RX ADMIN — BUDESONIDE 500 MCG: 0.5 INHALANT RESPIRATORY (INHALATION) at 08:55

## 2018-05-11 RX ADMIN — Medication 10 ML: at 07:31

## 2018-05-11 RX ADMIN — THERA TABS 1 TABLET: TAB at 08:49

## 2018-05-11 RX ADMIN — DOCUSATE SODIUM 100 MG: 100 CAPSULE, LIQUID FILLED ORAL at 08:49

## 2018-05-11 RX ADMIN — LISINOPRIL 2.5 MG: 5 TABLET ORAL at 08:49

## 2018-05-11 RX ADMIN — LEVOTHYROXINE SODIUM 88 MCG: 88 TABLET ORAL at 07:30

## 2018-05-11 RX ADMIN — FUROSEMIDE 20 MG: 20 TABLET ORAL at 08:49

## 2018-05-11 RX ADMIN — SODIUM CHLORIDE 5 MG: 9 INJECTION INTRAMUSCULAR; INTRAVENOUS; SUBCUTANEOUS at 01:55

## 2018-05-11 RX ADMIN — ANASTROZOLE 1 MG: 1 TABLET, COATED ORAL at 09:00

## 2018-05-11 RX ADMIN — Medication 1 CAPSULE: at 08:49

## 2018-05-11 RX ADMIN — DIBASIC SODIUM PHOSPHATE, MONOBASIC POTASSIUM PHOSPHATE AND MONOBASIC SODIUM PHOSPHATE 1 TABLET: 852; 155; 130 TABLET ORAL at 09:00

## 2018-05-11 RX ADMIN — INSULIN GLARGINE 10 UNITS: 100 INJECTION, SOLUTION SUBCUTANEOUS at 08:50

## 2018-05-11 RX ADMIN — ASPIRIN 81 MG 81 MG: 81 TABLET ORAL at 08:49

## 2018-05-11 RX ADMIN — LEVOFLOXACIN 750 MG: 750 TABLET, FILM COATED ORAL at 11:00

## 2018-05-11 RX ADMIN — POLYETHYLENE GLYCOL 3350 17 G: 17 POWDER, FOR SOLUTION ORAL at 09:00

## 2018-05-11 RX ADMIN — SPIRONOLACTONE 25 MG: 25 TABLET, FILM COATED ORAL at 08:49

## 2018-05-11 NOTE — PROGRESS NOTES
Hematology-Oncology Progress Note      Ninfa Tolliver  1935  [id number]      5/11/2018  10:15 AM    Follow-up for: ple fluid met breast ca   [x] Chart notes since last visit reviewed   [x] Medications reviewed for allergies and interactions    Case discussed with following: nursing staff. daughter  Patient complains of the following: weakness      Subjective:     12 point ROS negative except as in HPI and above    Spoke with patient who notes the following:     [] Anorexia   [] Nausea   [] Vomiting   [] Weakness   [] Dyspnea       [] Constipation   [] Diarrhea   [] Hemoptysis   [] Dysphagia       [] Cough Productive   [] Cough Non-productive     [] Pain asses  controlled    Objective:     Patient Vitals for the past 24 hrs:   BP Temp Pulse Resp SpO2 Weight   05/11/18 0854 115/57 97.9 °F (36.6 °C) 77 16 97 % -   05/11/18 0422 135/76 98.6 °F (37 °C) 74 16 100 % -   05/11/18 0238 - - - - - 61.7 kg (136 lb)   05/10/18 2132 - - - - 98 % -   05/10/18 2051 124/58 99.1 °F (37.3 °C) 87 16 100 % -   05/10/18 1808 126/66 - 86 - - -   05/10/18 1517 111/67 97.9 °F (36.6 °C) 80 18 99 % -   05/10/18 1320 - - - - 98 % -         Constitutional: Patient looks  [] Sick    [] Frail   [x] Better  [] In distress                                                [] Depressed   [] Tearful     [x] Content    HEENT:  [] NC  [] AT  [] alopecious  [] temporal  wasting    Eyes: [x] Normal [] Icteric    Oropharynx: [x] Normal [] Thrush [] Dry    Mucositis: [] None  Or present at grade  [] I  [] II  [] III  [] IV    Neck:   [x] Supple   [] Rigid    [] Thyromegaly  Lymphadenopathy: [] anterior Cervical  [] posterior Cervical [] Supraclav [] Infraclav  [] Axillary   [] Inguinal   [x] None Noted    Chest: [] Clear [] Rhonchi [] Rales  [] Wheezes      [] Dec'd @  [x] Right Base /  [x] Left Base    CV: [x] Regular [] Irregular [] Tachy [] Pawel Snuffer []Murmur    Abdominal:   [] Soft    []Distender    [] Tender     [] Nontender  BS:    [x] +    [] -    [] Hyper [] Hypo    Liver: [] Edge palp [x] Non-palp    Spleen: [] Edge palp [] Non-palp    Mass: [] Present [x] Absent    Extr: [] Lymphedema      [] Cyanosis      [] Clubbing    Edema: [x] None [] Right [] Left    Skin: [] Intact [] Decubiti [] Petechiae [] Purpura [] Ecchymoses    Rash: [] Present [x] Absent    Neuro: [x] Normal [] Confused [] Lethargic [] Obtunded  [] Reflexes     PSY             Good insight into disease    Available labs reviewed:  Labs:    Recent Results (from the past 24 hour(s))   GLUCOSE, POC    Collection Time: 05/10/18 11:23 AM   Result Value Ref Range    Glucose (POC) 204 (H) 65 - 100 mg/dL    Performed by [facility]    GLUCOSE, POC    Collection Time: 05/10/18  4:26 PM   Result Value Ref Range    Glucose (POC) 225 (H) 65 - 100 mg/dL    Performed by [facility]    GLUCOSE, POC    Collection Time: 05/10/18  8:57 PM   Result Value Ref Range    Glucose (POC) 275 (H) 65 - 100 mg/dL    Performed by [facility]    METABOLIC PANEL, COMPREHENSIVE    Collection Time: 05/11/18  4:16 AM   Result Value Ref Range    Sodium 136 136 - 145 mmol/L    Potassium 4.3 3.5 - 5.1 mmol/L    Chloride 101 97 - 108 mmol/L    CO2 26 21 - 32 mmol/L    Anion gap 9 5 - 15 mmol/L    Glucose 129 (H) 65 - 100 mg/dL    BUN 19 6 - 20 MG/DL    Creatinine 0.82 0.55 - 1.02 MG/DL    BUN/Creatinine ratio 23 (H) 12 - 20      GFR est AA >60 >60 ml/min/1.73m2    GFR est non-AA >60 >60 ml/min/1.73m2    Calcium 8.3 (L) 8.5 - 10.1 MG/DL    Bilirubin, total 0.4 0.2 - 1.0 MG/DL    ALT (SGPT) 20 12 - 78 U/L    AST (SGOT) 28 15 - 37 U/L    Alk.  phosphatase 580 (H) 45 - 117 U/L    Protein, total 6.3 (L) 6.4 - 8.2 g/dL    Albumin 1.9 (L) 3.5 - 5.0 g/dL    Globulin 4.4 (H) 2.0 - 4.0 g/dL    A-G Ratio 0.4 (L) 1.1 - 2.2     CBC WITH AUTOMATED DIFF    Collection Time: 05/11/18  4:16 AM   Result Value Ref Range    WBC 7.1 3.6 - 11.0 K/uL    RBC 3.19 (L) 3.80 - 5.20 M/uL    HGB 9.1 (L) 11.5 - 16.0 g/dL    HCT 28.8 (L) 35.0 - 47.0 %    MCV 90.3 80.0 - 99.0 FL    MCH 28.5 26.0 - 34.0 PG    MCHC 31.6 30.0 - 36.5 g/dL    RDW 20.6 (H) 11.5 - 14.5 %    PLATELET 498 898 - 700 K/uL    MPV 10.3 8.9 - 12.9 FL    NRBC 0.0 0  WBC    ABSOLUTE NRBC 0.00 0.00 - 0.01 K/uL    NEUTROPHILS 60 32 - 75 %    LYMPHOCYTES 21 12 - 49 %    MONOCYTES 12 5 - 13 %    EOSINOPHILS 5 0 - 7 %    BASOPHILS 1 0 - 1 %    IMMATURE GRANULOCYTES 1 (H) 0.0 - 0.5 %    ABS. NEUTROPHILS 4.1 1.8 - 8.0 K/UL    ABS. LYMPHOCYTES 1.5 0.8 - 3.5 K/UL    ABS. MONOCYTES 0.9 0.0 - 1.0 K/UL    ABS. EOSINOPHILS 0.4 0.0 - 0.4 K/UL    ABS. BASOPHILS 0.1 0.0 - 0.1 K/UL    ABS. IMM. GRANS. 0.1 (H) 0.00 - 0.04 K/UL    DF SMEAR SCANNED      PLATELET COMMENTS Large Platelets      RBC COMMENTS ANISOCYTOSIS  1+        RBC COMMENTS OVALOCYTES  PRESENT        RBC COMMENTS TARGET CELLS  PRESENT        RBC COMMENTS HYPOCHROMIA  PRESENT        RBC COMMENTS POLYCHROMASIA  PRESENT       GLUCOSE, POC    Collection Time: 05/11/18  6:02 AM   Result Value Ref Range    Glucose (POC) 125 (H) 65 - 100 mg/dL    Performed by [facility]        Imaging:  CXR Results  (Last 48 hours)               05/09/18 1036  XR CHEST PA LAT Final result    Impression:  IMPRESSION: Bibasilar densities as described above unchanged. Narrative:  INDICATION:  Pulmonary edema. COMPARISON: Yesterday. FINDINGS: AP and lateral radiographs of the chest demonstrate density at the   left lung base consistent with pleural effusion and/or consolidation. Similar   finding noted at the right lung base. With differences in technique are   considered, this has not changed significantly. Uli Lindquist Heart size stable. Atherosclerotic change of the aorta is noted. .  Compression fracture of L1   unchanged. PICC line is noted with the tip overlying the superior vena cava   region. .                CT Results  (Last 48 hours)    None          Chemotherapy monitored and toxicities assessed:    Time spent with family    Assessment:    -Assessment + Plan-     *) metastatic breast cancer. 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  Stable at 9 today. *) CHF. Heddie [place] Per cardiology. .  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 Required bronchoscopy to clear secretions again 5/1  ----- had thoracentesis on 5/5, cytology is benign  1. Plan:      To [facility] pamella today rehab pain med and hormone discussed

## 2018-05-11 NOTE — PROGRESS NOTES
Patient is being discharged today to [facility]. CM completed Cm portion of [facility]. Envelope and ambulance form on chart. Patient and family in agreement with this plan.     Nurse to call report to [phone]  Nurse to add discharge instructions, mars and kardex

## 2018-05-11 NOTE — PROGRESS NOTES
TRANSFER - OUT REPORT:    Verbal report given to Alma Rosa(name) on Tamie Slot  being transferred to [facility](unit) for routine progression of care       Report consisted of patients Situation, Background, Assessment and   Recommendations(SBAR). Information from the following report(s) SBAR was reviewed with the receiving nurse. Lines:       Opportunity for questions and clarification was provided.       Patient transported with:   [facility]

## 2018-05-11 NOTE — DISCHARGE SUMMARY
Discharge Summary       PATIENT ID: Davion Lou  MRN: [id number]   YOB: 1935    DATE OF ADMISSION: 4/16/2018  4:09 AM    DATE OF DISCHARGE: 5/11/18   PRIMARY CARE PROVIDER: Shawn Azul MD     ATTENDING PHYSICIAN: Jose Devi   DISCHARGING PROVIDER: Priyanka Chen MD    To contact this individual call [phone] and ask the  to page. If unavailable ask to be transferred the Adult Hospitalist Department. CONSULTATIONS: IP CONSULT TO CARDIOLOGY  IP CONSULT TO NEPHROLOGY  IP CONSULT TO NEUROLOGY  IP CONSULT TO GASTROENTEROLOGY  IP CONSULT TO FAMILY PRACTICE  IP CONSULT TO INFECTIOUS DISEASES  IP CONSULT TO INTERVENTIONAL RADIOLOGY  IP CONSULT TO THORACIC SURGERY  IP CONSULT TO GENERAL SURGERY    PROCEDURES/SURGERIES: * No procedures listed *    ADMITTING [address] [place] [address] COURSE:     The patient is an 82-year-old female with history of diabetes and rheumatoid arthritis who presented to the emergency room via EMS with complaints of shortness of breath. The patient reports the shortness of breath started today. Shortness of breath was associated with PND and orthopnea. Patient reports cough that is productive of whitish phlegm. There is no fever, chills or rigors. The patient has no nasal congestion, sore throat, epistaxis or rhinorrhea. The patient reports a prior episode of dyspnea on exertion. Patient has no palpitations, irregular heartbeat, anxiety or depression. She denied diaphoresis, wheeze, no rhonchi. She also denied chest pain or chest tightness. There is no report of trauma or fall. Patient has no headache, lightheadedness, dizziness, blurry vision, double vision, syncopal episode or seizures. She has no neck pain, neck stiffness or confusion. According to family, the patient was being worked up for congestive heart failure when she had a recent echocardiogram that has not been reported.   Patient denied dysuria, frequency, hematuria, urethral discharge or vaginal discharge. There is no lower extremity pain or swelling. The patient has no back pain or flank pain. Patient denied loss of appetite, dysphagia or odynophagia. She also denied generalized weakness, focal weakness, numbness, tingling sensation or abnormal sensation.     EMS reported that the patient was hypoxic when they presented to  the patient. Her O2 saturation was in the 70s on room air. Patient was placed on CPAP and O2 saturation improved. EMS also gave the patient furosemide 50 mg intravenously.     PAST MEDICAL HISTORY:  1. Anemia. 2.  Colon cancer, status post chemo and surgery. 3.  Diabetes mellitus. 4.  Gastroesophageal reflux disease. 5.  History of cerebrovascular accident with left-sided weakness. 6.  Hypothyroidism. 7.  Seasonal allergies. 8.  History of osteoporosis. 9.  Rheumatoid arthritis. 10.  Essential hypertension.     PAST SURGICAL HISTORY:  1. Colectomy. 2.  Hysterectomy. 3.  Colonoscopies.     MEDICATIONS:  Alirocumab 1 mL subQ once every 2 weeks, amlodipine 5 mg daily, ascorbate 1000 mg daily, aspirin 81 mg daily, carvedilol 25 mg 2 times daily, chlorthalidone 50 mg daily, cholecalciferol 1000 units daily, cinnamon bark 250 mg daily, clopidogrel 75 mg daily, etanercept 1 mL subQ every 7 days, evolocumab 1 mL subQ every 14 days, folic acid 1 mg daily, Amaryl, glimepiride 1 mg every morning, Lecithin 800 mg 2 times daily, levothyroxine 112 mcg daily, magnesium malate 200 mg daily, metformin 1000 mg 2 times daily, multivitamin 1 tablet daily, Omega 3 fatty acids 300 mg daily, potassium chloride 20 mEq daily, sitagliptin 50 mg daily.     ALLERGIES:  STATINS, SULFA.     SOCIAL HISTORY:  Patient is .   She denies tobacco, alcohol or recreational drug use.     FAMILY HISTORY:  Significant for diabetes in the mother, stroke in the mother, diabetes in the sister, diabetes in the brother, stomach cancer in the father, and diabetes in sister.     REVIEW OF SYSTEMS: More than 12 systems reviewed and the pertinent positives are in the history of present illness. All others are negative. Recent Results (from the past 8 hour(s))   METABOLIC PANEL, COMPREHENSIVE    Collection Time: 05/11/18  4:16 AM   Result Value Ref Range    Sodium 136 136 - 145 mmol/L    Potassium 4.3 3.5 - 5.1 mmol/L    Chloride 101 97 - 108 mmol/L    CO2 26 21 - 32 mmol/L    Anion gap 9 5 - 15 mmol/L    Glucose 129 (H) 65 - 100 mg/dL    BUN 19 6 - 20 MG/DL    Creatinine 0.82 0.55 - 1.02 MG/DL    BUN/Creatinine ratio 23 (H) 12 - 20      GFR est AA >60 >60 ml/min/1.73m2    GFR est non-AA >60 >60 ml/min/1.73m2    Calcium 8.3 (L) 8.5 - 10.1 MG/DL    Bilirubin, total 0.4 0.2 - 1.0 MG/DL    ALT (SGPT) 20 12 - 78 U/L    AST (SGOT) 28 15 - 37 U/L    Alk. phosphatase 580 (H) 45 - 117 U/L    Protein, total 6.3 (L) 6.4 - 8.2 g/dL    Albumin 1.9 (L) 3.5 - 5.0 g/dL    Globulin 4.4 (H) 2.0 - 4.0 g/dL    A-G Ratio 0.4 (L) 1.1 - 2.2     CBC WITH AUTOMATED DIFF    Collection Time: 05/11/18  4:16 AM   Result Value Ref Range    WBC 7.1 3.6 - 11.0 K/uL    RBC 3.19 (L) 3.80 - 5.20 M/uL    HGB 9.1 (L) 11.5 - 16.0 g/dL    HCT 28.8 (L) 35.0 - 47.0 %    MCV 90.3 80.0 - 99.0 FL    MCH 28.5 26.0 - 34.0 PG    MCHC 31.6 30.0 - 36.5 g/dL    RDW 20.6 (H) 11.5 - 14.5 %    PLATELET 781 081 - 785 K/uL    MPV 10.3 8.9 - 12.9 FL    NRBC 0.0 0  WBC    ABSOLUTE NRBC 0.00 0.00 - 0.01 K/uL    NEUTROPHILS 60 32 - 75 %    LYMPHOCYTES 21 12 - 49 %    MONOCYTES 12 5 - 13 %    EOSINOPHILS 5 0 - 7 %    BASOPHILS 1 0 - 1 %    IMMATURE GRANULOCYTES 1 (H) 0.0 - 0.5 %    ABS. NEUTROPHILS 4.1 1.8 - 8.0 K/UL    ABS. LYMPHOCYTES 1.5 0.8 - 3.5 K/UL    ABS. MONOCYTES 0.9 0.0 - 1.0 K/UL    ABS. EOSINOPHILS 0.4 0.0 - 0.4 K/UL    ABS. BASOPHILS 0.1 0.0 - 0.1 K/UL    ABS. IMM.  GRANS. 0.1 (H) 0.00 - 0.04 K/UL    DF SMEAR SCANNED      PLATELET COMMENTS Large Platelets      RBC COMMENTS ANISOCYTOSIS  1+        RBC COMMENTS OVALOCYTES  PRESENT        RBC COMMENTS TARGET CELLS  PRESENT        RBC COMMENTS HYPOCHROMIA  PRESENT        RBC COMMENTS POLYCHROMASIA  PRESENT       GLUCOSE, POC    Collection Time: 05/11/18  6:02 AM   Result Value Ref Range    Glucose (POC) 125 (H) 65 - 100 mg/dL    Performed by [facility]        CT Results  (Last 48 hours)    None        CT CHEST   IMPRESSION  IMPRESSION:       THORAX:     1. Large left pleural effusion and complete atelectasis of the left lung  resulting in opacification of the left hemithorax. 2. Moderate-sized right pleural effusion and complete atelectasis of the right  lower lobe. 3. Small pericardial effusion. 4. Widespread metastatic disease of the skeletal structures as described above.     ABDOMEN/PELVIS:     1. There is distention of the stomach and it least several small bowel loops. The possibility of at least partial small bowel obstruction cannot be excluded  by this limited examination. 2. Staghorn calculus of the left kidney without hydronephrosis.     Echo   FT VENTRICLE: Size was at the upper limits of normal. Systolic function  was mildly reduced. Ejection fraction was estimated to be 45 %. There was  mild hypokinesis of the basal-mid anteroseptal and apical septal wall(s). RIGHT VENTRICLE: The size was normal. Systolic function was normal. Wall  thickness was normal.    LEFT ATRIUM: The atrium was mildly to moderately dilated. RIGHT ATRIUM: The atrium was mildly dilated. MITRAL VALVE: Normal valve structure. There was normal leaflet separation. DOPPLER: The transmitral velocity was within the normal range. There was  no evidence for stenosis. There was moderate regurgitation. AORTIC VALVE: Leaflets exhibited sclerosis without stenosis. TRICUSPID VALVE: Normal valve structure. There was normal leaflet  separation. DOPPLER: The transtricuspid velocity was within the normal  range. There was no evidence for tricuspid stenosis. There was no  regurgitation.  Pulmonary artery systolic pressure: 60 mmHg. PULMONIC VALVE: Leaflets exhibited normal thickness, no calcification, and  normal cuspal separation. DOPPLER: The transpulmonic velocity was within  the normal range. There was no regurgitation. AORTA: The root exhibited normal size. PERICARDIUM: A probable, small pericardial effusion was identified  posterior to the heart. There was no evidence of hemodynamic compromise. There was a moderate-sized left pleural effusion. Hospital course       Acute on chronic hypoxic/hypercarbic respiratory failure due to pulmonary edema, pneumonia  -s/p bronchoscopy on 4/25 and on 4/26 suctioned and cleared  -respiratory culture grow on 4/25 and 4/26 grow candida albicans, on diflucan which is stopped   -4/30,resp distress and CXR worsening worsening of near complete left hemithorax opacification.  -On and off BIPAP  -S/p bronch and clearing of secretion on 5/1  -CXR shows Increased bilateral pleural effusions and lung opacities resulting in near  complete opacification of the left hemithorax. - S/p thoracentesis  - Consider pleurx drain if recurs   - Oxygen saturation is stable  Cytology is negative for malignancy . Clear cxr on discharge      Acute encephalopathy possible due to metabolic. Resolved  -CT head on 4/24 volume loss and minimal white matter disease. No acute intracranial [place], Sinus mucosal inflammatory change  -MRI of brain 4/26 Study limited by motion artifact, volume loss and white matter disease. No definite intracranial metastasis however no intravenous contrast was administered due to poor renal function. 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   - continue supportive care     Sepsis secondary to E Coli UTI, pneumonia  POA  -urine cx  4/19 grow e coli, pseudomanas sp  -blood cx on 4/18, 4/19 and 4/22 no growth  Antinfectives  -Ceftriaxone 1 dose on 4/19  -Diflucan 4/26-present  -Levaquin 4/24-present  -Meropenem 4/24-present  -Zosyn 4/19-4/24  -Vancomycin 4/19-4/28  - Antibiotics management as per Infectious disease. Will discharge on levaquin few pills to finish course      NSTEMI  - on aspirin,coreg. Statin intolerant,she is on fish oil. She was getting Evolocumab injections out patient.  -cardiologist on board patient refused cath .will be done outpatient   Stop plavix for now due to anemia and high risk of bleeding      Acute systolic CHF NYHA Class IV  -Intermittent diuresis,coreg   Systolic function  was mildly reduced. Ejection fraction was estimated to be 45 %. There was  mild hypokinesis of the basal-mid anteroseptal and apical septal wall(s). -cardiology following         JEROD on CKD stage III,creatinie improved  -creatinine improved and stabilized. Increase Lasix to 40 mg daily, now back to 20 daily   -nephrologist on board     Metastatic left breast cancer with diffuse bone mets.+er,+Pr  -on armidex   -Hem/Onc on board  Will follow outpatient with Dr Brittny Chung      HTN  -BP stablen , coreg monitor BP. Added low dose lisinopril 5/3 and will be on aldactone for heart failure      DM-II  -Blood glucose 170-130s. She off the oral medications(Glimepiride,sitagliptin and metformin) she was taking PTA  -Add low dose Lantus 8 units sc daily,continue insulin sliding scale, monitor finger stick glucose  Will be off lantus on discharge and will take januvia   im taking off metformin because high risk of acidosis with heart failure      Hypothyroidism  -continue synthroid     Hx of GERD  -continue pepcid     Elevated liver enzyme   -possible related to liver lesion, monitor LFT     Anemia multifactorial  -Hgb 6.7, s/p 2 units pRBC on 4/26,   -Hgb 9.3, monitor H/H  On procrit .     Hx of colon cancer 25 years ago  -according to son, there is a work-up planned with [facility] regarding the possibility of recurrent CA (based on lesions seen in the calvarium on MRI - 3/19).     -hem/onc on board     H/o stroke with residual weakness on the left     Diet,suspect silent aspiration.     Hypomagnesemia: iv mg today,on oral magnesium.     H/O Seropositive RA of multiple sites,stable. -Apparently she was getting Etanercept sc injections out patient.     Debility: PT/OT requested. She likely needs rehab on discharge     Partial SBO   GI lite diet today  SBO seems to be resolved  Continue current care  miralax          DISCHARGE DIAGNOSES / PLAN:             PENDING TEST RESULTS:   At the time of discharge the following test results are still pending:     FOLLOW UP APPOINTMENTS:    Follow-up Information     Follow up With Details Comments Contact [facility]  inpatient rehab [facility] Cade Gaffney MD   [address]  [phone]             ADDITIONAL CARE RECOMMENDATIONS:     Please follow up with pcp       DIET: Diabetic Diet  Oral Nutritional Supplements:     ACTIVITY: Activity as tolerated    WOUND CARE:     EQUIPMENT needed:       DISCHARGE MEDICATIONS:  Current Discharge Medication List      START taking these medications    Details   anastrozole (ARIMIDEX) 1 mg tablet Take 1 Tab by mouth daily. Qty: 90 Tab, Refills: 0      docusate sodium (COLACE) 100 mg capsule Take 1 Cap by mouth two (2) times a day for 90 days. Qty: 60 Cap, Refills: 2      furosemide (LASIX) 20 mg tablet Take one pill daily by mouth . Qty: 90 Tab, Refills: 0      L. acidoph & paracasei- S therm- Bifido (BERYL-Q/RISAQUAD) 8 billion cell cap cap Take 1 Cap by mouth daily. Qty: 90 Cap, Refills: 0      spironolactone (ALDACTONE) 25 mg tablet Take 1 Tab by mouth daily. Qty: 90 Tab, Refills: 0      polyethylene glycol (MIRALAX) 17 gram packet Take 1 Packet by mouth daily. Qty: 1 Each, Refills: 0      phosphorus (K PHOS NEUTRAL) 250 mg tablet Take 1 Tab by mouth two (2) times a day.   Qty: 90 Tab, Refills: 0      lisinopril (PRINIVIL, ZESTRIL) 2.5 mg tablet Take 1 Tab by mouth daily.  Qty: 90 Tab, Refills: 0      epoetin celio (EPOGEN;PROCRIT) 10,000 unit/mL injection 1 mL by SubCUTAneous route every Monday, Wednesday, Friday. Indications: ANEMIA DUE TO RENAL FAILURE  Qty: 1 Vial, Refills: 0         CONTINUE these medications which have CHANGED    Details   carvedilol (COREG) 6.25 mg tablet Take 1 Tab by mouth two (2) times daily (with meals). Qty: 90 Tab, Refills: 0         CONTINUE these medications which have NOT CHANGED    Details   glimepiride (AMARYL) 1 mg tablet Take 1 mg by mouth every morning. levothyroxine (SYNTHROID) 88 mcg tablet Take 88 mcg by mouth Daily (before breakfast). folic acid (FOLVITE) 1 mg tablet TAKE ONE TABLET BY MOUTH DAILY  Qty: 90 Tab, Refills: 2    Associated Diagnoses: Seropositive rheumatoid arthritis of multiple sites (HCC)      sitaGLIPtin (JANUVIA) 25 mg tablet Take 50 mg by mouth daily. VIT C/VIT E/LUTEIN/MIN/OMEGA-3 (OCUVITE PO) Take 1 Tab by mouth daily. MAGNESIUM MALATE Take 400 mg by mouth daily. MULTIVITAMIN WITH MINERALS (HAIR,SKIN & NAILS PO) Take 1 Tab by mouth daily. aspirin delayed-release 81 mg tablet Take 81 mg by mouth daily. OMEGA-3 FATTY ACIDS/FISH OIL (OMEGA 3 FISH OIL PO) Take 300 mg by mouth daily. ascorbate calcium (MAKAYLA-C) 500 mg Tab Take 1,000 mg by mouth daily. CHOLECALCIFEROL, VITAMIN D3, (VITAMIN D-3 PO) Take 1,000 Units by mouth daily.       FORTEO 20 mcg/dose - 600 mcg/2.4 mL pnij injection INJECT 20MCG ONCE DAILY  Qty: 2.4 mL, Refills: 0         STOP taking these medications       clopidogrel (PLAVIX) 75 mg tab Comments:   Reason for Stopping:         potassium chloride SR (KLOR-CON 10) 10 mEq tablet Comments:   Reason for Stopping:         chlorthalidone (HYGROTEN) 50 mg tablet Comments:   Reason for Stopping:         metFORMIN (GLUCOPHAGE) 1,000 mg tablet Comments:   Reason for Stopping:                 NOTIFY YOUR PHYSICIAN FOR ANY OF THE FOLLOWING:   Fever over 101 degrees for 24 hours. Chest pain, shortness of breath, fever, chills, nausea, vomiting, diarrhea, change in mentation, falling, weakness, bleeding. Severe pain or pain not relieved by medications. Or, any other signs or symptoms that you may have questions about.     DISPOSITION:    Home With:   OT  PT  HH  RN       Long term SNF/Inpatient Rehab    Independent/assisted living    Hospice    Other:       PATIENT CONDITION AT DISCHARGE:     Functional status    Poor    X Deconditioned     Independent      Cognition   X  Lucid     Forgetful     Dementia      Catheters/lines (plus indication)    [place]     PICC     PEG    X None      Code status   X  Full code     DNR      PHYSICAL EXAMINATION AT DISCHARGE:   Refer to Progress Note  Visit Vitals    /57 (BP 1 Location: Left arm, BP Patient Position: At rest)    Pulse 77    Temp 97.9 °F (36.6 °C)    Resp 16    Ht 5' 1\" (1.549 m)    Wt 61.7 kg (136 lb)    SpO2 97%    BMI 25.7 kg/m2     CVS S1S2  CHEST CLEAR   ABD SOFT   LEGS NO EDEMA  NEURO NO NEURO DEFICIT       CHRONIC MEDICAL DIAGNOSES:  Problem List as of 5/11/2018  Date Reviewed: 4/16/2018          Codes Class Noted - Resolved    Acute systolic heart failure (HCC) ICD-10-CM: I50.21  ICD-9-CM: 428.21  4/24/2018 - Present        Altered mental status, unspecified ICD-10-CM: R41.82  ICD-9-CM: 780.97  4/23/2018 - Present        * (Principal)SOB (shortness of breath) ICD-10-CM: R06.02  ICD-9-CM: 786.05  4/16/2018 - Present        CKD (chronic kidney disease) stage 3, GFR 30-59 ml/min ICD-10-CM: N18.3  ICD-9-CM: 585.3  10/25/2017 - Present        Vitamin D deficiency ICD-10-CM: E55.9  ICD-9-CM: 268.9  6/16/2017 - Present        Asymptomatic hyperuricemia ICD-10-CM: E79.0  ICD-9-CM: 790.6  2/16/2017 - Present        Statin intolerance ICD-10-CM: Z78.9  ICD-9-CM: 995.27  12/21/2016 - Present        Long-term use of immunosuppressant medication ICD-10-CM: Z79.899  ICD-9-CM: V58.69  11/21/2016 - Present        Seropositive rheumatoid arthritis of multiple sites [facility]) ICD-10-CM: M05.79  ICD-9-CM: 714.0  9/28/2016 - Present        Primary osteoarthritis of both knees ICD-10-CM: M17.0  ICD-9-CM: 715.16  9/28/2016 - Present        Occlusion and stenosis of carotid artery without mention of cerebral infarction ICD-10-CM: I65.29  ICD-9-CM: 433.10  8/23/2013 - Present        Weakness due to cerebrovascular accident ICD-10-CM: [id number]  ICD-9-CM: Joya Allen  4/2/2012 - Present        Neuropathy in diabetes [facility]) ICD-10-CM: E11.40  ICD-9-CM: 250.60, 357.2  4/2/2012 - Present        PAD (peripheral artery disease) ([facility] ICD-10-CM: I73.9  ICD-9-CM: 443.9  9/7/2011 - Present        Carotid bruit ICD-10-CM: R09.89  ICD-9-CM: 785.9  8/31/2011 - Present        Claudication ([facility] ICD-10-CM: I73.9  ICD-9-CM: 443.9  8/31/2011 - Present        Weakness of left arm ICD-10-CM: R29.898  ICD-9-CM: 729.89  8/31/2011 - Present        Hyperlipidemia ICD-10-CM: E78.5  ICD-9-CM: 272.4  1/27/2010 - Present        DM (diabetes mellitus) ([facility] ICD-10-CM: E11.9  ICD-9-CM: 250.00  9/2/2009 - Present        Hypothyroid ICD-10-CM: E03.9  ICD-9-CM: 244.9  9/2/2009 - Present        Colon cancer ([facility] ICD-10-CM: C18.9  ICD-9-CM: 153.9  9/2/2009 - Present        H/O: CVA ICD-9-CM: V12.54  9/2/2009 - Present        Microalbuminuria ICD-10-CM: R80.9  ICD-9-CM: 791.0  9/2/2009 - Present        Age-related osteoporosis without current pathological fracture ICD-10-CM: M81.0  ICD-9-CM: 733.01  9/2/2009 - Present        Essential hypertension ICD-10-CM: I10  ICD-9-CM: 401.9  9/2/2009 - Present        Anemia ICD-10-CM: D64.9  ICD-9-CM: 285.9  9/2/2009 - Present              Greater than 20  minutes were spent with the patient on counseling and coordination of care    Signed:   Lillian Lawrence MD  5/11/2018  10:08 AM

## 2018-05-11 NOTE — PROGRESS NOTES
Bedside shift change report given to Ira Lowe (oncoming nurse) [facility] (offgoing [facility]). Report included the following information SBAR, Kardex and MAR.

## 2018-05-15 ENCOUNTER — PATIENT OUTREACH (OUTPATIENT)
Dept: CARDIOLOGY CLINIC | Age: 83
End: 2018-05-15

## 2018-05-15 NOTE — PROGRESS NOTES
Hospital Discharge Follow-Up      Date/Time:  5/15/2018 11:40 AM    Patient was admitted to [facility] on April 16 and discharged on May 11, 2018 for dyspnea, AMS, HFrEF; oncology tx. The physician discharge summary was available at the time of outreach. Patient was contacted within 2 business days of discharge. Attempt to reach pt's son - Lm - Call to Jarod Mackay - said pt is doing well - was transferred to [address] [place] [address] re: appt 5/25 1 pm with Dr. Chip Saravia - for cardiology follow up and discussion about left heart cath. She had consultations during this admission with services:  CONSULTATIONS: IP CONSULT TO CARDIOLOGY  IP CONSULT TO NEPHROLOGY  IP CONSULT TO NEUROLOGY  IP CONSULT TO GASTROENTEROLOGY  IP CONSULT TO FAMILY PRACTICE  IP CONSULT TO INFECTIOUS DISEASES  IP CONSULT TO INTERVENTIONAL RADIOLOGY  IP CONSULT TO THORACIC SURGERY  IP CONSULT TO [address] course / Acute conditions -      1. Acute on chronic hypoxic/hypercarbic respiratory failure due to pulmonary edema, pneumonia  -s/p bronchoscopy on 4/25 and on 4/26 suctioned and cleared  -respiratory culture grow on 4/25 and 4/26 grow candida albicans, on diflucan which is stopped   -4/30,resp distress and CXR worsening worsening of near complete left hemithorax opacification.  -On and off BIPAP  -S/p bronch and clearing of secretion on 5/1  -CXR shows Increased bilateral pleural effusions and lung opacities resulting in near  complete opacification of the left hemithorax. - S/p thoracentesis      2. Acute encephalopathy possible due to metabolic. Resolved  -CT head on 4/24  -MRI of brain 4/26 . 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      3.   Sepsis secondary to E Coli UTI, pneumonia  POA  -urine cx  4/19 grow e coli, pseudomanas sp  -blood cx on 4/18, 4/19 and 4/22 no growth  Antinfectives  -Ceftriaxone 1 dose on 4/19  -Diflucan 4/26-present  -Levaquin 4/24-present  -Meropenem 4/24-present  -Zosyn 4/19-4/24  -Vancomycin 4/19-4/28  - Antibiotics management as per Infectious disease. Will discharge on levaquin few pills to finish course       4. NSTEMI  - on aspirin,coreg. Statin intolerant,she is on fish oil. She was getting Evolocumab injections out patient.  -cardiologist on board patient refused cath .will be done outpatient   Stop plavix for now due to anemia and high risk of bleeding       5. Acute systolic CHF NYHA Class IV  -Intermittent diuresis,coreg   Systolic function was mildly reduced. Ejection fraction was estimated to be 45 %. There was mild hypokinesis of the basal-mid anteroseptal and apical septal wall(s). -cardiology following      6. JEROD on CKD stage III,creatinie improved  -creatinine improved and stabilized. Increase Lasix to 40 mg daily, now back to 20 daily   -nephrologist on board      7. Metastatic left breast cancer with diffuse bone mets.+er,+Pr  -on armidex   -Hem/Onc on board  Will follow outpatient with Dr Maralyn Prader  8. HTN  -BP stable , coreg monitor BP. Added low dose lisinopril 5/3 and will be on aldactone for heart failure       9. DM-II  -Blood glucose 170-130s. She off the oral medications(Glimepiride,sitagliptin and metformin) she was taking PTA  -Add low dose Lantus 8 units sc daily,continue insulin sliding scale, monitor finger stick glucose  Will be off lantus on discharge and will take [place] and [place]   im taking off metformin because high risk of acidosis with heart failure       Hypothyroidism  -continue synthroid      Hx of GERD  -continue pepcid      Elevated liver enzyme   -possible related to liver lesion, monitor LFT      Anemia multifactorial  -Hgb 6.7, s/p 2 units pRBC on 4/26,   -Hgb 9.3, monitor H/H  On procrit .      Hx of colon cancer 25 years ago  -according to son, there is a work-up planned with [facility] regarding the possibility of recurrent CA (based on lesions seen in the calvarium on MRI - 3/19).     -hem/onc on board      H/o stroke with residual weakness on the left    Diet,suspect silent aspiration.    Hypomagnesemia: iv mg today,on oral magnesium.    H/O Seropositive RA of multiple sites,stable. -Apparently she was getting Etanercept sc injections out patient.      Debility: PT/OT requested. She likely needs rehab on discharge (DC to [facility])      Partial SBO   GI lite diet today  SBO seems to be resolved  Continue current care  miralax     Method of communication with provider :staff message    Was this a readmission? no   Patient stated reason for the readmission: n/a    Nurse Navigator (NN) contacted the family/ LM for son/ and talked to [address] for [address] [place] [address] - request call for coordination of care- by telephone to perform post hospital discharge assessment. Verified name and  with caregiver as identifiers. Provided introduction to self, and explanation of the Nurse Navigator role. Reviewed discharge instructions and red flags with caregiver who verbalized understanding. Caregiver given an opportunity to ask questions and does not have any further questions or concerns at this time. The caregiver agrees to contact the PCP office for questions related to their healthcare. NN provided contact information for future reference. Summary of patients top problems:  1. CHF and daily weights/ medication management  2. Breast cancer with metastasis  3. Debility and multiple comorbids - as listed above; [address] orders at discharge: n/a d/c to [address] [place] [address]: n/a   Date of initial visit: n/a    Durable Medical Equipment ordered/company: n/a d/c to rehab  Durable Medical Equipment received: n/a    Barriers to care?  depression, stages of grief, utilization of services/ poly pharmacy/ multiple comorbids -     Advance Care Planning:   Does patient have an Advance Directive:  not on file     Medication:     New Medications at Discharge: Arimidex  Every day, Colace 1 cap bid, Epogen 1 mg sq M//, Lasix 20 mg every day, Elise Caps, Levaquine 50 mg every 48 hours - for 5 days - Lisinopril 2.5 mg every day, K phos, bid, Miralax, every day, Spironolactone 25 mg every day   Changed Medications at Discharge: Coreg 6.25 mg bid, Synthroid 88 mcg qam  Discontinued Medications at Discharge: [place], Plavix, Glucophage, Klor Con 10 m Forteo     Medication reconciliation was performed with caregiver, /attempting coordination with HS _  for CM - requested current med list - to compare with discharge - who verbalizes understanding of administration of home medications. There were no barriers to obtaining medications identified at this time. Referral to Pharm D needed: no     PCP/Specialist follow up: Future Appointments  Date Time Provider Prasanna Diana   5/25/2018 1:00 PM Keyonna Deal  [address]      Goals      Knowledge and adherence of prescribed medication (ie. action, side effects, missed dose, etc.).            5/15/18  Complex medication list - with multiple new meds/ and stopping several meds -   Request to  - for med list - to compare and review any changes. Jak Briceño RN , CHFN, [facility]  NN [facility]  [phone]       Supportive resources in place to maintain patient in the community (ie. Home Health, DME equipment, refer to, medication assistant plan, etc.)            5/15/18  Pt discharged to [facility] - rehab - on 5/11 - for strengthening/ debility  Pt has follow up appt with cardiology 5/25,   Oncology in 3 weeks - Rheumatology - 3 weeks - RA infusions.      ttk          Jak Briceño RN , [address], [facility]   [address]  [phone]

## 2018-05-19 ENCOUNTER — APPOINTMENT (OUTPATIENT)
Dept: GENERAL RADIOLOGY | Age: 83
DRG: 280 | End: 2018-05-19
Attending: EMERGENCY MEDICINE
Payer: MEDICARE

## 2018-05-19 ENCOUNTER — HOSPITAL ENCOUNTER (INPATIENT)
Age: 83
LOS: 6 days | Discharge: NURSING, REHAB OR LONG-TERM CARE | DRG: 280 | End: 2018-05-25
Attending: EMERGENCY MEDICINE | Admitting: INTERNAL MEDICINE
Payer: MEDICARE

## 2018-05-19 ENCOUNTER — APPOINTMENT (OUTPATIENT)
Dept: CT IMAGING | Age: 83
DRG: 280 | End: 2018-05-19
Attending: EMERGENCY MEDICINE
Payer: MEDICARE

## 2018-05-19 DIAGNOSIS — J81.0 ACUTE PULMONARY EDEMA (HCC): ICD-10-CM

## 2018-05-19 DIAGNOSIS — I50.21 ACUTE SYSTOLIC HEART FAILURE (HCC): Primary | ICD-10-CM

## 2018-05-19 DIAGNOSIS — J90 BILATERAL PLEURAL EFFUSION: ICD-10-CM

## 2018-05-19 PROBLEM — I50.23 ACUTE ON CHRONIC SYSTOLIC HF (HEART FAILURE) (HCC): Status: ACTIVE | Noted: 2018-05-19

## 2018-05-19 LAB
ALBUMIN SERPL-MCNC: 2.2 G/DL (ref 3.5–5)
ALBUMIN/GLOB SERPL: 0.4 {RATIO} (ref 1.1–2.2)
ALP SERPL-CCNC: 731 U/L (ref 45–117)
ALT SERPL-CCNC: 19 U/L (ref 12–78)
ANION GAP SERPL CALC-SCNC: 7 MMOL/L (ref 5–15)
APPEARANCE UR: ABNORMAL
APTT PPP: 34.1 SEC (ref 22.1–32)
ARTERIAL PATENCY WRIST A: YES
ARTERIAL PATENCY WRIST A: YES
AST SERPL-CCNC: 25 U/L (ref 15–37)
ATRIAL RATE: 125 BPM
ATRIAL RATE: 79 BPM
BACTERIA URNS QL MICRO: NEGATIVE /HPF
BASE DEFICIT BLD-SCNC: 1 MMOL/L
BASE DEFICIT BLD-SCNC: 2 MMOL/L
BASOPHILS # BLD: 0.1 K/UL (ref 0–0.1)
BASOPHILS NFR BLD: 1 % (ref 0–1)
BDY SITE: ABNORMAL
BDY SITE: NORMAL
BILIRUB SERPL-MCNC: 0.4 MG/DL (ref 0.2–1)
BILIRUB UR QL: NEGATIVE
BNP SERPL-MCNC: ABNORMAL PG/ML (ref 0–450)
BUN SERPL-MCNC: 20 MG/DL (ref 6–20)
BUN/CREAT SERPL: 21 (ref 12–20)
CALCIUM SERPL-MCNC: 8.8 MG/DL (ref 8.5–10.1)
CALCULATED P AXIS, ECG09: 49 DEGREES
CALCULATED P AXIS, ECG09: 58 DEGREES
CALCULATED R AXIS, ECG10: 28 DEGREES
CALCULATED R AXIS, ECG10: 58 DEGREES
CALCULATED T AXIS, ECG11: 128 DEGREES
CALCULATED T AXIS, ECG11: 146 DEGREES
CHLORIDE SERPL-SCNC: 99 MMOL/L (ref 97–108)
CO2 SERPL-SCNC: 26 MMOL/L (ref 21–32)
COLOR UR: ABNORMAL
CREAT SERPL-MCNC: 0.97 MG/DL (ref 0.55–1.02)
DIAGNOSIS, 93000: NORMAL
DIAGNOSIS, 93000: NORMAL
DIFFERENTIAL METHOD BLD: ABNORMAL
EOSINOPHIL # BLD: 0.2 K/UL (ref 0–0.4)
EOSINOPHIL NFR BLD: 2 % (ref 0–7)
EPITH CASTS URNS QL MICRO: ABNORMAL /LPF
ERYTHROCYTE [DISTWIDTH] IN BLOOD BY AUTOMATED COUNT: 20.5 % (ref 11.5–14.5)
GAS FLOW.O2 O2 DELIVERY SYS: ABNORMAL L/MIN
GAS FLOW.O2 O2 DELIVERY SYS: NORMAL L/MIN
GLOBULIN SER CALC-MCNC: 5.4 G/DL (ref 2–4)
GLUCOSE BLD STRIP.AUTO-MCNC: 179 MG/DL (ref 65–100)
GLUCOSE BLD STRIP.AUTO-MCNC: 201 MG/DL (ref 65–100)
GLUCOSE BLD STRIP.AUTO-MCNC: 218 MG/DL (ref 65–100)
GLUCOSE BLD STRIP.AUTO-MCNC: 291 MG/DL (ref 65–100)
GLUCOSE SERPL-MCNC: 336 MG/DL (ref 65–100)
GLUCOSE UR STRIP.AUTO-MCNC: 500 MG/DL
HCO3 BLD-SCNC: 24.1 MMOL/L (ref 22–26)
HCO3 BLD-SCNC: 24.6 MMOL/L (ref 22–26)
HCT VFR BLD AUTO: 34.7 % (ref 35–47)
HGB BLD-MCNC: 10.7 G/DL (ref 11.5–16)
HGB UR QL STRIP: ABNORMAL
IMM GRANULOCYTES # BLD: 0.1 K/UL (ref 0–0.04)
IMM GRANULOCYTES NFR BLD AUTO: 1 % (ref 0–0.5)
INR PPP: 1.1 (ref 0.9–1.1)
KETONES UR QL STRIP.AUTO: NEGATIVE MG/DL
LEUKOCYTE ESTERASE UR QL STRIP.AUTO: ABNORMAL
LYMPHOCYTES # BLD: 1.1 K/UL (ref 0.8–3.5)
LYMPHOCYTES NFR BLD: 14 % (ref 12–49)
MAGNESIUM SERPL-MCNC: 1.8 MG/DL (ref 1.6–2.4)
MCH RBC QN AUTO: 27.6 PG (ref 26–34)
MCHC RBC AUTO-ENTMCNC: 30.8 G/DL (ref 30–36.5)
MCV RBC AUTO: 89.4 FL (ref 80–99)
MONOCYTES # BLD: 1 K/UL (ref 0–1)
MONOCYTES NFR BLD: 12 % (ref 5–13)
NEUTS SEG # BLD: 5.6 K/UL (ref 1.8–8)
NEUTS SEG NFR BLD: 70 % (ref 32–75)
NITRITE UR QL STRIP.AUTO: NEGATIVE
NRBC # BLD: 0 K/UL (ref 0–0.01)
NRBC BLD-RTO: 0 PER 100 WBC
P-R INTERVAL, ECG05: 138 MS
P-R INTERVAL, ECG05: 144 MS
PCO2 BLD: 44.4 MMHG (ref 35–45)
PCO2 BLD: 44.5 MMHG (ref 35–45)
PH BLD: 7.34 [PH] (ref 7.35–7.45)
PH BLD: 7.35 [PH] (ref 7.35–7.45)
PH UR STRIP: 6 [PH] (ref 5–8)
PHOSPHATE SERPL-MCNC: 4.1 MG/DL (ref 2.6–4.7)
PLATELET # BLD AUTO: 343 K/UL (ref 150–400)
PMV BLD AUTO: 10.9 FL (ref 8.9–12.9)
PO2 BLD: 64 MMHG (ref 80–100)
PO2 BLD: 80 MMHG (ref 80–100)
POTASSIUM SERPL-SCNC: 4.4 MMOL/L (ref 3.5–5.1)
PROT SERPL-MCNC: 7.6 G/DL (ref 6.4–8.2)
PROT UR STRIP-MCNC: 30 MG/DL
PROTHROMBIN TIME: 11.4 SEC (ref 9–11.1)
Q-T INTERVAL, ECG07: 324 MS
Q-T INTERVAL, ECG07: 410 MS
QRS DURATION, ECG06: 86 MS
QRS DURATION, ECG06: 86 MS
QTC CALCULATION (BEZET), ECG08: 467 MS
QTC CALCULATION (BEZET), ECG08: 470 MS
RBC # BLD AUTO: 3.88 M/UL (ref 3.8–5.2)
RBC #/AREA URNS HPF: ABNORMAL /HPF (ref 0–5)
RBC MORPH BLD: ABNORMAL
RBC MORPH BLD: ABNORMAL
SAO2 % BLD: 91 % (ref 92–97)
SAO2 % BLD: 95 % (ref 92–97)
SERVICE CMNT-IMP: ABNORMAL
SODIUM SERPL-SCNC: 132 MMOL/L (ref 136–145)
SP GR UR REFRACTOMETRY: 1.01 (ref 1–1.03)
SPECIMEN TYPE: ABNORMAL
SPECIMEN TYPE: NORMAL
THERAPEUTIC RANGE,PTTT: ABNORMAL SECS (ref 58–77)
TOTAL RESP. RATE, ITRR: 18
TOTAL RESP. RATE, ITRR: 20
TROPONIN I SERPL-MCNC: 1.55 NG/ML
TROPONIN I SERPL-MCNC: 14.8 NG/ML
TROPONIN I SERPL-MCNC: 24.5 NG/ML
UA: UC IF INDICATED,UAUC: ABNORMAL
UROBILINOGEN UR QL STRIP.AUTO: 0.2 EU/DL (ref 0.2–1)
VENTRICULAR RATE, ECG03: 125 BPM
VENTRICULAR RATE, ECG03: 79 BPM
WBC # BLD AUTO: 8.1 K/UL (ref 3.6–11)
WBC URNS QL MICRO: ABNORMAL /HPF (ref 0–4)
YEAST URNS QL MICRO: PRESENT

## 2018-05-19 PROCEDURE — 99285 EMERGENCY DEPT VISIT HI MDM: CPT

## 2018-05-19 PROCEDURE — 74011250637 HC RX REV CODE- 250/637: Performed by: HOSPITALIST

## 2018-05-19 PROCEDURE — 77030005514 HC CATH URETH FOL14 BARD -A

## 2018-05-19 PROCEDURE — 96375 TX/PRO/DX INJ NEW DRUG ADDON: CPT

## 2018-05-19 PROCEDURE — 71045 X-RAY EXAM CHEST 1 VIEW: CPT

## 2018-05-19 PROCEDURE — 74011250636 HC RX REV CODE- 250/636: Performed by: HOSPITALIST

## 2018-05-19 PROCEDURE — 83880 ASSAY OF NATRIURETIC PEPTIDE: CPT | Performed by: EMERGENCY MEDICINE

## 2018-05-19 PROCEDURE — 85025 COMPLETE CBC W/AUTO DIFF WBC: CPT | Performed by: EMERGENCY MEDICINE

## 2018-05-19 PROCEDURE — 74011636637 HC RX REV CODE- 636/637: Performed by: HOSPITALIST

## 2018-05-19 PROCEDURE — 65660000000 HC RM CCU STEPDOWN

## 2018-05-19 PROCEDURE — 77030011256 HC DRSG MEPILEX <16IN NO BORD MOLN -A

## 2018-05-19 PROCEDURE — 96374 THER/PROPH/DIAG INJ IV PUSH: CPT

## 2018-05-19 PROCEDURE — 74011250636 HC RX REV CODE- 250/636: Performed by: INTERNAL MEDICINE

## 2018-05-19 PROCEDURE — 81001 URINALYSIS AUTO W/SCOPE: CPT | Performed by: EMERGENCY MEDICINE

## 2018-05-19 PROCEDURE — 36600 WITHDRAWAL OF ARTERIAL BLOOD: CPT

## 2018-05-19 PROCEDURE — 36415 COLL VENOUS BLD VENIPUNCTURE: CPT | Performed by: HOSPITALIST

## 2018-05-19 PROCEDURE — 71250 CT THORAX DX C-: CPT

## 2018-05-19 PROCEDURE — 74011250636 HC RX REV CODE- 250/636: Performed by: EMERGENCY MEDICINE

## 2018-05-19 PROCEDURE — 85730 THROMBOPLASTIN TIME PARTIAL: CPT | Performed by: EMERGENCY MEDICINE

## 2018-05-19 PROCEDURE — 83735 ASSAY OF MAGNESIUM: CPT | Performed by: EMERGENCY MEDICINE

## 2018-05-19 PROCEDURE — 94762 N-INVAS EAR/PLS OXIMTRY CONT: CPT

## 2018-05-19 PROCEDURE — 74176 CT ABD & PELVIS W/O CONTRAST: CPT

## 2018-05-19 PROCEDURE — 85610 PROTHROMBIN TIME: CPT | Performed by: EMERGENCY MEDICINE

## 2018-05-19 PROCEDURE — 84484 ASSAY OF TROPONIN QUANT: CPT | Performed by: EMERGENCY MEDICINE

## 2018-05-19 PROCEDURE — 93005 ELECTROCARDIOGRAM TRACING: CPT

## 2018-05-19 PROCEDURE — 82803 BLOOD GASES ANY COMBINATION: CPT

## 2018-05-19 PROCEDURE — 87086 URINE CULTURE/COLONY COUNT: CPT | Performed by: EMERGENCY MEDICINE

## 2018-05-19 PROCEDURE — 80053 COMPREHEN METABOLIC PANEL: CPT | Performed by: EMERGENCY MEDICINE

## 2018-05-19 PROCEDURE — 84100 ASSAY OF PHOSPHORUS: CPT | Performed by: EMERGENCY MEDICINE

## 2018-05-19 PROCEDURE — 82962 GLUCOSE BLOOD TEST: CPT

## 2018-05-19 PROCEDURE — 74011636637 HC RX REV CODE- 636/637: Performed by: EMERGENCY MEDICINE

## 2018-05-19 RX ORDER — LISINOPRIL 5 MG/1
2.5 TABLET ORAL DAILY
Status: DISCONTINUED | OUTPATIENT
Start: 2018-05-19 | End: 2018-05-25 | Stop reason: HOSPADM

## 2018-05-19 RX ORDER — ENOXAPARIN SODIUM 100 MG/ML
60 INJECTION SUBCUTANEOUS EVERY 12 HOURS
Status: DISCONTINUED | OUTPATIENT
Start: 2018-05-19 | End: 2018-05-20

## 2018-05-19 RX ORDER — FUROSEMIDE 20 MG/1
20 TABLET ORAL DAILY
Status: DISCONTINUED | OUTPATIENT
Start: 2018-05-19 | End: 2018-05-19

## 2018-05-19 RX ORDER — INSULIN LISPRO 100 [IU]/ML
INJECTION, SOLUTION INTRAVENOUS; SUBCUTANEOUS
Status: DISCONTINUED | OUTPATIENT
Start: 2018-05-19 | End: 2018-05-25 | Stop reason: HOSPADM

## 2018-05-19 RX ORDER — FUROSEMIDE 10 MG/ML
80 INJECTION INTRAMUSCULAR; INTRAVENOUS
Status: COMPLETED | OUTPATIENT
Start: 2018-05-19 | End: 2018-05-19

## 2018-05-19 RX ORDER — LEVOTHYROXINE SODIUM 88 UG/1
88 TABLET ORAL
Status: DISCONTINUED | OUTPATIENT
Start: 2018-05-19 | End: 2018-05-25 | Stop reason: HOSPADM

## 2018-05-19 RX ORDER — GLIMEPIRIDE 2 MG/1
1 TABLET ORAL
Status: DISCONTINUED | OUTPATIENT
Start: 2018-05-19 | End: 2018-05-25 | Stop reason: HOSPADM

## 2018-05-19 RX ORDER — DEXTROSE 50 % IN WATER (D50W) INTRAVENOUS SYRINGE
12.5-25 AS NEEDED
Status: DISCONTINUED | OUTPATIENT
Start: 2018-05-19 | End: 2018-05-25 | Stop reason: HOSPADM

## 2018-05-19 RX ORDER — CARVEDILOL 6.25 MG/1
6.25 TABLET ORAL 2 TIMES DAILY WITH MEALS
Status: DISCONTINUED | OUTPATIENT
Start: 2018-05-19 | End: 2018-05-25 | Stop reason: HOSPADM

## 2018-05-19 RX ORDER — ASPIRIN 81 MG/1
81 TABLET ORAL DAILY
Status: DISCONTINUED | OUTPATIENT
Start: 2018-05-19 | End: 2018-05-25 | Stop reason: HOSPADM

## 2018-05-19 RX ORDER — ANASTROZOLE 1 MG/1
1 TABLET ORAL DAILY
Status: DISCONTINUED | OUTPATIENT
Start: 2018-05-19 | End: 2018-05-25 | Stop reason: HOSPADM

## 2018-05-19 RX ORDER — FOLIC ACID 1 MG/1
1 TABLET ORAL DAILY
Status: DISCONTINUED | OUTPATIENT
Start: 2018-05-19 | End: 2018-05-25 | Stop reason: HOSPADM

## 2018-05-19 RX ORDER — FUROSEMIDE 10 MG/ML
40 INJECTION INTRAMUSCULAR; INTRAVENOUS DAILY
Status: DISCONTINUED | OUTPATIENT
Start: 2018-05-19 | End: 2018-05-22

## 2018-05-19 RX ORDER — ENOXAPARIN SODIUM 100 MG/ML
40 INJECTION SUBCUTANEOUS EVERY 24 HOURS
Status: DISCONTINUED | OUTPATIENT
Start: 2018-05-19 | End: 2018-05-19

## 2018-05-19 RX ORDER — DOCUSATE SODIUM 100 MG/1
100 CAPSULE, LIQUID FILLED ORAL 2 TIMES DAILY
Status: DISCONTINUED | OUTPATIENT
Start: 2018-05-19 | End: 2018-05-25 | Stop reason: HOSPADM

## 2018-05-19 RX ORDER — POLYETHYLENE GLYCOL 3350 17 G/17G
17 POWDER, FOR SOLUTION ORAL DAILY
Status: DISCONTINUED | OUTPATIENT
Start: 2018-05-19 | End: 2018-05-25 | Stop reason: HOSPADM

## 2018-05-19 RX ORDER — ACETAMINOPHEN 325 MG/1
650 TABLET ORAL
Status: DISCONTINUED | OUTPATIENT
Start: 2018-05-19 | End: 2018-05-25 | Stop reason: HOSPADM

## 2018-05-19 RX ORDER — SPIRONOLACTONE 25 MG/1
25 TABLET ORAL DAILY
Status: DISCONTINUED | OUTPATIENT
Start: 2018-05-19 | End: 2018-05-25 | Stop reason: HOSPADM

## 2018-05-19 RX ORDER — MAGNESIUM SULFATE 100 %
4 CRYSTALS MISCELLANEOUS AS NEEDED
Status: DISCONTINUED | OUTPATIENT
Start: 2018-05-19 | End: 2018-05-25 | Stop reason: HOSPADM

## 2018-05-19 RX ADMIN — HUMAN INSULIN 5 UNITS: 100 INJECTION, SOLUTION SUBCUTANEOUS at 04:29

## 2018-05-19 RX ADMIN — SITAGLIPTIN 50 MG: 25 TABLET, FILM COATED ORAL at 15:34

## 2018-05-19 RX ADMIN — FUROSEMIDE 80 MG: 10 INJECTION, SOLUTION INTRAMUSCULAR; INTRAVENOUS at 02:34

## 2018-05-19 RX ADMIN — INSULIN LISPRO 3 UNITS: 100 INJECTION, SOLUTION INTRAVENOUS; SUBCUTANEOUS at 19:23

## 2018-05-19 RX ADMIN — FUROSEMIDE 40 MG: 10 INJECTION, SOLUTION INTRAMUSCULAR; INTRAVENOUS at 12:50

## 2018-05-19 RX ADMIN — INSULIN LISPRO 2 UNITS: 100 INJECTION, SOLUTION INTRAVENOUS; SUBCUTANEOUS at 12:49

## 2018-05-19 RX ADMIN — LEVOTHYROXINE SODIUM 88 MCG: 88 TABLET ORAL at 15:26

## 2018-05-19 RX ADMIN — ENOXAPARIN SODIUM 40 MG: 40 INJECTION SUBCUTANEOUS at 07:02

## 2018-05-19 RX ADMIN — INSULIN LISPRO 2 UNITS: 100 INJECTION, SOLUTION INTRAVENOUS; SUBCUTANEOUS at 22:53

## 2018-05-19 RX ADMIN — ENOXAPARIN SODIUM 60 MG: 100 INJECTION SUBCUTANEOUS at 15:35

## 2018-05-19 RX ADMIN — FUROSEMIDE 80 MG: 10 INJECTION, SOLUTION INTRAMUSCULAR; INTRAVENOUS at 05:26

## 2018-05-19 RX ADMIN — GLIMEPIRIDE 1 MG: 1 TABLET ORAL at 15:34

## 2018-05-19 NOTE — H&P
# [id number]  Dx: Acute on chronic systolic heart failure        Metastatic left breast cancer        Elevated troponin

## 2018-05-19 NOTE — CONSULTS
CARDIOLOGY Consultation Note     Subjective:      Flavio Carlin is a 82 y.o. patient who is seen for evaluation of elevation of cardiac troponin XIV 0.8 and BNP level of 16,000. The patient friend is at bedside but her son Moraima Deras called me over the phone. He is currently in [place].  He said his mother had chest pain when she was at [facility] cardiac rehab. She was giving sedation but he and his brother thought that was unusual for her so therefore they wanted her to be brought to the emergency room at [facility]. She was admitted and now the troponin is elevated. Twelve-lead EKG showed normal sinus rhythm with lateral T-wave inversion. she is sleeping right now and not able to give me any history. Her son said that she has been short of breath and had pleural effusion and pericardial effusion. I had seen her back in April 2018 and a 2D echocardiogram had reported ejection fraction of 45% and moderate mitral valve regurgitation and small pericardial effusion. At that time the patient had pulmonary edema hypertension diabetes non-ST elevation MI and she also had a history of stroke. Dr. Samy Dc usually takes care of her.      She has many CAD risk factors as below including previous stroke and left carotid stenosis  Patient Active Problem List   Diagnosis Code    DM (diabetes mellitus) ([facility] E11.9    Hypothyroid E03.9    Colon cancer ([facility] C18.9    H/O: CVA     Microalbuminuria R80.9    Age-related osteoporosis without current pathological fracture M81.0    Essential hypertension I10    Anemia D64.9    Hyperlipidemia E78.5    Carotid bruit R09.89    Claudication ([facility]) I73.9    Weakness of left arm R29.898    PAD (peripheral artery disease) ([facility]) I73.9    Weakness due to cerebrovascular accident [id number]    Neuropathy in diabetes ([facility] E11.40    Occlusion and stenosis of carotid artery without mention of cerebral infarction I65.29    Seropositive rheumatoid arthritis of multiple sites [facility]) M05.79    Primary osteoarthritis of both knees M17.0    Long-term use of immunosuppressant medication Z79.899    Statin intolerance Z78.9    Asymptomatic hyperuricemia E79.0    Vitamin D deficiency E55.9    CKD (chronic kidney disease) stage 3, GFR 30-59 ml/min N18.3    SOB (shortness of breath) R06.02    Altered mental status, unspecified C65.46    Acute systolic heart failure (HCC) I50.21    Acute on chronic systolic HF (heart failure) (HCC) I50.23     Current Facility-Administered Medications   Medication Dose Route Frequency Provider Last Rate Last Dose    acetaminophen (TYLENOL) tablet 650 mg  650 mg Oral Q4H PRN Melisa Frazier MD        glucose chewable tablet 16 g  4 Tab Oral PRN Missy Newell MD        dextrose (D50W) injection syrg 12.5-25 g  12.5-25 g IntraVENous PRN Missy Newell MD        glucagon (GLUCAGEN) injection 1 mg  1 mg IntraMUSCular PRN Missy Newell MD        insulin lispro (HUMALOG) injection   SubCUTAneous AC&HS Missy Newell MD   2 Units at 05/19/18 1249    anastrozole (ARIMIDEX) tablet 1 mg  1 mg Oral DAILY Missy Newell MD   Stopped at 05/19/18 1000    carvedilol (COREG) tablet 6.25 mg  6.25 mg Oral BID WITH MEALS Missy Newell MD   Stopped at 05/19/18 0900    docusate sodium (COLACE) capsule 100 mg  100 mg Oral BID Missy Newell MD   Stopped at 05/19/18 1000    levothyroxine (SYNTHROID) tablet 88 mcg  88 mcg Oral ACB Missy Newell MD   Stopped at 05/19/18 0900    lactobac ac& pc-s.therm-b.anim (BERYL Q/RISAQUAD)  1 Cap Oral DAILY Missy Newell MD   Stopped at 05/19/18 1000    glimepiride (AMARYL) tablet 1 mg  1 mg Oral 7am Missy Newell MD   Stopped at 05/19/18 0900    lisinopril (PRINIVIL, ZESTRIL) tablet 2.5 mg  2.5 mg Oral DAILY Missy Newell MD   Stopped at 05/19/18 1000    polyethylene glycol (MIRALAX) packet 17 g  17 g Oral DAILY Missy Newell MD   Stopped at 05/19/18 1000    spironolactone (ALDACTONE) tablet 25 mg  25 mg Oral DAILY Wendy Dawkins MD   Stopped at  9472    folic acid (FOLVITE) tablet 1 mg  1 mg Oral DAILY Wendy Dawkins MD   Stopped at 18 1000    SITagliptin (JANUVIA) tablet tab 50 mg  50 mg Oral DAILY Wendy Dawkins MD   Stopped at 18 1000    aspirin delayed-release tablet 81 mg  81 mg Oral DAILY Wendy Dawkins MD   Stopped at 18 1000    furosemide (LASIX) injection 40 mg  40 mg IntraVENous DAILY Wendy Dawkins MD   40 mg at 18 1250    enoxaparin (LOVENOX) injection 60 mg  60 mg SubCUTAneous Q12H Wendy Dawkins MD   Stopped at 18 1200     Allergies   Allergen Reactions    Statins-Hmg-Coa Reductase Inhibitors Other (comments)     Intolerant to statins     Sulfa (Sulfonamide Antibiotics) Other (comments)     syncope     Past Medical History:   Diagnosis Date    Anemia 2009    Colon cancer ([facility] 2009    surgery/chemo    Colon cancer ([facility] 2009    DM (diabetes mellitus) ([facility] 2009    GERD (gastroesophageal reflux disease)     H/O: CVA 2009    slight l sided weakness    Hypothyroid 2009    Ill-defined condition     seasonal allergies    Microalbuminuria 2009    Osteoporosis 2009    Other and unspecified hyperlipidemia 2010    Rheumatoid arthritis involving ankle ([facility] 2016    Rheumatoid arthritis(714.0) 2009    Unspecified essential hypertension 2009    Weakness due to cerebrovascular accident      Past Surgical History:   Procedure Laterality Date    HX COLECTOMY      HX HYSTERECTOMY      HX OTHER SURGICAL      colonoscopies numerous since      Family History   Problem Relation Age of Onset    Diabetes Mother     Stroke Mother     Diabetes Sister     Other Sister      fell and hit her head -  of this   [facility] Diabetes Brother     Cancer Father      stomach    Diabetes Sister      Social History   Substance Use Topics    Smoking status: Never Smoker    Smokeless tobacco: Never Used    Alcohol use No        Review of Systems:   She is not able to give me history at this time because of drowsiness     Objective:     Visit Vitals    BP 93/53 (BP 1 Location: Right arm, BP Patient Position: At rest)    Pulse 85    Temp 97.7 °F (36.5 °C)    Resp 16    Wt 123 lb 3.8 oz (55.9 kg)    SpO2 99%    BMI 23.29 kg/m2      Physical Exam:   Constitutional: well-developed and well-nourished. No respiratory distress. Head: Normocephalic and atraumatic. Neck: supple. No JVD present. Cardiovascular: Normal rate, regular rhythm. Exam reveals no gallop and no friction rub. No murmur heard. Pulmonary/Chest: Effort normal and breath sounds normal. No wheezes. Abdominal: Soft, no tenderness. Musculoskeletal: no edema. Neurological: Sleepy and drowsy  Skin: Skin is warm and dry      Assessment/Plan:   1 elevation of troponin, consistent with a non-ST elevation MI  2 ischemic cardiomyopathy with acute systolic congestive heart failure  3 mitral valve regurgitation  4 history of stroke  5 diabetes mellitus  6 anemia  7 DNR  8 metastatic breast cancer    The patient does appear drowsy and comfortable at this time. Clinically she was stable. Her O2 saturation is 100% and her heart rate was 80 bpm.  I confirm with her son Savanah Rodriguez that he wants her to have a DNR order but she will continue with medical therapy. She is on appropriate medications including intravenous diuretic. She is on Lovenox. She is on aspirin, Coreg lisinopril and Aldactone. She cannot take statin drug because of intolerance  The son said that she be in town tomorrow traveling from [place].  He said he will talk to Dr. Nathaniel Moreno on Monday about performing a cardiac catheterization. In April the patient was known to have metastatic breast cancer to the pleural space and also may have bone marrow involvement.   At that time Dr. Nathaniel Moreno said that she refused cardiac catheterization and he wanted to delay it for further assessment of her clinical improvement. He did not rule out a cardiac catheterization at that time with her son, he said that he would readdress that at a later time  The patient's son also wanted her to be considered for thoracentesis or placement of a pigtail in the pleural space. He asked for pulmonology consultation. I asked him to convey this to the attending hospitalist.  The chest x-ray reported interstitial edema and bilateral pleural effusions    Thank you for involving me in this patient's care and please call with further concerns or questions. Kina Hernandez M.D.   Electrophysiology/Cardiology  [address] [facility] [place]  [facility], [facility] [address] [place] [address]  [phone]

## 2018-05-19 NOTE — PROGRESS NOTES
The following herbal, alternative, and/or nutritional/dietary supplement product(s) has been discontinued  per [employer] approved policy:    Magnesium malate   Please reorder upon discharge if appropriate.

## 2018-05-19 NOTE — IP AVS SNAPSHOT
[address] [facility] [address] 
[phone] Patient: Samm Travis MRN: [id number] VRY:[id number] About your hospitalization You were admitted on:  May 19, 2018 You last received care in the:  [facility] 2N MED SURG You were discharged on:  May 25, 2018 Why you were hospitalized Your primary diagnosis was:  Not on File Your diagnoses also included:  Acute On Chronic Systolic Hf (Heart Failure) (Hcc) Follow-up Information Follow up With Details Comments Contact Info Sherie Álvarez MD Schedule an appointment as soon as possible for a visit Follow up with Dr. Gui Suarez 1 week after discharged from [address] [place] [address] [address] [facility] 57 
[phone] Oncologist  Follow up with oncology as scheduled Carolina García MD   [address] [place] [address] 
[phone] [address] [place] [address], [address]  inpatient rehab 1114 [facility] [address] 
[phone] Your Scheduled Appointments Thursday July 05, 2018 11:00 AM EDT New Patient with MD Hugo Viramontes 51 Internists [facility]) [address], [address] [facility] 57  
[phone] Discharge Orders None A check stella indicates which time of day the medication should be taken. My Medications START taking these medications Instructions Each Dose to Equal  
 Morning Noon Evening Bedtime  
 clopidogrel 75 mg Tab Commonly known as:  PLAVIX Your last dose was: Your next dose is: Take 1 Tab by mouth daily. 75 mg CONTINUE taking these medications Instructions Each Dose to Equal  
 Morning Noon Evening Bedtime  
 anastrozole 1 mg tablet Commonly known as:  ARIMIDEX Your last dose was: Your next dose is: Take 1 Tab by mouth daily. 1 mg  
    
   
   
   
  
 aspirin delayed-release 81 mg tablet Your last dose was: Your next dose is: Take 81 mg by mouth daily. 81 mg  
    
   
   
   
  
 carvedilol 6.25 mg tablet Commonly known as:  Rinku Hernandez Your last dose was: Your next dose is: Take 1 Tab by mouth two (2) times daily (with meals). 6.25 mg  
    
   
   
   
  
 docusate sodium 100 mg capsule Commonly known as:  Ann Marie Nella Your last dose was: Your next dose is: Take 1 Cap by mouth two (2) times a day for 90 days. 100 mg  
    
   
   
   
  
 epoetin celio 10,000 unit/mL injection Commonly known as:  EPOGEN;PROCRIT Your last dose was: Your next dose is:    
   
   
 1 mL by SubCUTAneous route every Monday, Wednesday, Friday. 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  
 23636 Units MAKAYLA-C 500 mg Tab Generic drug:  ascorbate calcium Your last dose was: Your next dose is: Take 1,000 mg by mouth daily. 1000 mg  
    
   
   
   
  
 etanercept 50 mg/mL (0.98 mL) injection Commonly known as:  ENBREL Your last dose was: Your next dose is:    
   
   
 1 mL by SubCUTAneous route every seven (7) days. 50 mg  
    
   
   
   
  
 evolocumab pen injection Commonly known as:  Jinx Beagle Your last dose was: Your next dose is:    
   
   
 1 mL by SubCUTAneous route every fourteen (14) days. 140 mg  
    
   
   
   
  
 folic acid 1 mg tablet Commonly known as:  Google Your last dose was: Your next dose is: TAKE ONE TABLET BY MOUTH DAILY  
     
   
   
   
  
 furosemide 20 mg tablet Commonly known as:  LASIX Your last dose was: Your next dose is: Take one pill daily by mouth .  
     
   
   
   
  
 glimepiride 1 mg tablet Commonly known as:  AMARYL Your last dose was: Your next dose is: Take 1 mg by mouth every morning. 1 mg HAIR,SKIN & NAILS PO Your last dose was: Your next dose is: Take 1 Tab by mouth daily. 1 Tab JANUVIA 25 mg tablet Generic drug:  SITagliptin Your last dose was: Your next dose is: Take 50 mg by mouth daily. 50 mg  
    
   
   
   
  
 L. acidoph & paracasei- S therm- Bifido 8 billion cell Cap cap Commonly known as:  BERYL-Q/RISAQUAD Your last dose was: Your next dose is: Take 1 Cap by mouth daily. 1 Cap  
    
   
   
   
  
 lisinopril 2.5 mg tablet Commonly known as:  Pecola Cypher Your last dose was: Your next dose is: Take 1 Tab by mouth daily. 2.5 mg  
    
   
   
   
  
 MAGNESIUM MALATE Your last dose was: Your next dose is: Take 400 mg by mouth daily. 400 mg  
    
   
   
   
  
 OCUVITE PO Your last dose was: Your next dose is: Take 1 Tab by mouth daily. 1 Tab OMEGA 3 FISH OIL PO Your last dose was: Your next dose is: Take 300 mg by mouth daily. 300 mg  
    
   
   
   
  
 phosphorus 250 mg tablet Commonly known as:  K PHOS NEUTRAL Your last dose was: Your next dose is: Take 1 Tab by mouth two (2) times a day. 1 Tab  
    
   
   
   
  
 polyethylene glycol 17 gram packet Commonly known as:  Lugene Minder Your last dose was: Your next dose is: Take 1 Packet by mouth daily. 17 g  
    
   
   
   
  
 spironolactone 25 mg tablet Commonly known as:  ALDACTONE Your last dose was: Your next dose is: Take 1 Tab by mouth daily. 25 mg SYNTHROID 88 mcg tablet Generic drug:  levothyroxine Your last dose was: Your next dose is: Take 88 mcg by mouth Daily (before breakfast). 88 mcg VITAMIN D3 PO Your last dose was: Your next dose is: Take 1,000 Units by mouth daily. 1000 Units Where to Get Your Medications Information on where to get these meds will be given to you by the nurse or doctor. ! Ask your nurse or doctor about these medications  
  clopidogrel 75 mg Tab Discharge Instructions Discharge SNF/Rehab Instructions/LTAC  
 
 
PATIENT ID: Ludivina Cee MRN: [id number] YOB: 1935 DATE OF ADMISSION: 5/19/2018  2:01 AM   
DATE OF DISCHARGE: 5/25/2018 PRIMARY CARE PROVIDER: Lily Marquez MD  
 
 
ATTENDING PHYSICIAN: Cristina Bazan MD 
DISCHARGING PROVIDER: Cristina Bazan MD    
To contact this individual call [phone] and ask the  to page. If unavailable ask to be transferred the Adult Hospitalist Department. CONSULTATIONS: IP CONSULT TO HOSPITALIST 
IP CONSULT TO CARDIOLOGY PROCEDURES/SURGERIES: * No surgery found * ADMITTING DIAGNOSES & HOSPITAL COURSE:  
1. Acute on chronic systolic heart failure (POA) - cardiology was consulted. Continued on GDMT. Diuresed w/ IV lasix with improvement. Room air and stable at discharge. 2. NSTEMI - s/p cath w/ severe 3 vessel disease. Not candidate for CABG or stents per cardiology. Medical management. Follow up with cardiologist Dr. Toshia Monroy 1 week after SNF discharge. 3. Bilateral pleural effusions (POA) - possible related to CHF and/or malignant. Cytology from thoracentesis 5/5 was negative but still may be malignant. CXR 5/23 small amount not drainable. 4. Acute hypoxic respiratory failure -- due to CHF, pleural effusion, NSTEMI. S/p IV lasix w/ improvement. On room air at discharge.   
5. Metastatic left breast cancer ER/VT +, Her-2 - (chronic) - Continue anastrozole, f/u with oncology as outpatient as scheduled. 6. DM2 with hyperglycemia - recent A1c 7, stable. Continue meds. 7. Hypothyroidism (chronic) - continue meds 8. Anemia (chronic) - stable 9. RA (chronic) - receives outpatient etanercept 10. HLD (chronic) - statin allergy, receives outpatient evolocumab 11. CKD-III: stable DISCHARGE DIAGNOSES / PLAN:   
 
1. Acute on chronic systolic heart failure (POA) - cardiology was consulted. Continued on GDMT. Diuresed w/ IV lasix with improvement. Room air and stable at discharge. 2. NSTEMI - s/p cath w/ severe 3 vessel disease. Not candidate for CABG or stents per cardiology. Medical management. Follow up with cardiologist Dr. Leonid France 1 week after SNF discharge. 3. Bilateral pleural effusions (POA) - possible related to CHF and/or malignant. Cytology from thoracentesis 5/5 was negative but still may be malignant. CXR 5/23 small amount not drainable. 4. Acute hypoxic respiratory failure -- due to CHF, pleural effusion, NSTEMI. S/p IV lasix w/ improvement. On room air at discharge. 5. Metastatic left breast cancer ER/MT +, Her-2 - (chronic) - Continue anastrozole, f/u with oncology as outpatient as scheduled. 6. DM2 with hyperglycemia - recent A1c 7, stable. Continue meds. 7. Hypothyroidism (chronic) - continue meds 8. Anemia (chronic) - stable 9. RA (chronic) - receives outpatient etanercept 10. HLD (chronic) - statin allergy, receives outpatient evolocumab 11. CKD-III: stable PENDING TEST RESULTS:  
At the time of discharge the following test results are still pending: none FOLLOW UP APPOINTMENTS:   
Follow-up Information Follow up With Details Comments Contact Info Jn López MD Schedule an appointment as soon as possible for a visit Follow up with Dr. Leonid France 1 week after discharged from [address] [place] [address] 
[phone] Oncologist  Follow up with oncology as scheduled Rosy [place]guiano [place]   [address] [facility] [address] 
[phone] ADDITIONAL CARE RECOMMENDATIONS: n/a DIET: Cardiac Diet and Diabetic Diet ACTIVITY: Activity as tolerated WOUND CARE: n/a 
 
EQUIPMENT needed: as determined by PT/OT 
 
 
DISCHARGE MEDICATIONS: 
 See Medication Reconciliation Form NOTIFY YOUR PHYSICIAN FOR ANY OF THE FOLLOWING:  
Fever over 101 degrees for 24 hours. Chest pain, shortness of breath, fever, chills, nausea, vomiting, diarrhea, change in mentation, falling, weakness, bleeding. Severe pain or pain not relieved by medications. Or, any other signs or symptoms that you may have questions about. DISPOSITION: 
  Home With: 
 OT  PT  HH  RN  
  
x SNF/Inpatient Rehab/LTAC Independent/assisted living Hospice Other:  
 
 
PATIENT CONDITION AT DISCHARGE:  
 
Functional status Poor   
x Deconditioned Independent Cognition  
x  Lucid Forgetful Dementia Catheters/lines (plus indication) Alford PICC   
 PEG   
x None Code status Full code   
x DNR PHYSICAL EXAMINATION AT DISCHARGE: 
Visit Vitals  /82 (BP Patient Position: At rest)  Pulse 82  Temp 98.9 °F (37.2 °C)  Resp 22  Wt 54.5 kg (120 lb 2.4 oz)  SpO2 95%  Breastfeeding No  
 BMI 22.7 kg/m2 Gen - NAD HEENT - MMM Neck - supple, full ROM 
CV - RRR, +s4, 2/6 systolic murmur Resp - lungs decreased at bases with crackles, no wheezing, normal WOB 
GI - abdomen S, NT, ND, +BS. No hepatosplenomegaly  - no CVA tenderness, bladder non-palpable in lower abdomen MSK - normal tone and bulk, no edema Neuro - A&O, no focal deficits Psych - calm and cooperative with normal affect CHRONIC MEDICAL DIAGNOSES: 
Problem List as of 5/25/2018  Date Reviewed: 4/16/2018 Codes Class Noted - Resolved Acute on chronic systolic HF (heart failure) (HCC) ICD-10-CM: C12.96 ICD-9-CM: 428.23  5/19/2018 - Present Acute systolic heart failure (HCC) ICD-10-CM: I50.21 ICD-9-CM: 428.21  4/24/2018 - Present Altered mental status, unspecified ICD-10-CM: R41.82 
ICD-9-CM: 780.97  4/23/2018 - Present SOB (shortness of breath) ICD-10-CM: R06.02 
ICD-9-CM: 786.05  4/16/2018 - Present CKD (chronic kidney disease) stage 3, GFR 30-59 ml/min ICD-10-CM: N18.3 ICD-9-CM: 585.3  10/25/2017 - Present Vitamin D deficiency ICD-10-CM: E55.9 ICD-9-CM: 268.9  6/16/2017 - Present Asymptomatic hyperuricemia ICD-10-CM: E79.0 ICD-9-CM: 790.6  2/16/2017 - Present Statin intolerance ICD-10-CM: Z78.9 ICD-9-CM: 995.27  12/21/2016 - Present Long-term use of immunosuppressant medication ICD-10-CM: Z79.899 ICD-9-CM: V58.69  11/21/2016 - Present Seropositive rheumatoid arthritis of multiple sites [facility]) ICD-10-CM: M05.79 ICD-9-CM: 714.0  9/28/2016 - Present Primary osteoarthritis of both knees ICD-10-CM: M17.0 ICD-9-CM: 715.16  9/28/2016 - Present Occlusion and stenosis of carotid artery without mention of cerebral infarction ICD-10-CM: I65.29 ICD-9-CM: 433.10  8/23/2013 - Present Weakness due to cerebrovascular accident ICD-10-CM: DFU8305 ICD-9-CM: AYD1459  4/2/2012 - Present Neuropathy in diabetes [facility]) ICD-10-CM: E11.40 ICD-9-CM: 250.60, 357.2  4/2/2012 - Present PAD (peripheral artery disease) (HCC) ICD-10-CM: I73.9 ICD-9-CM: 443.9  9/7/2011 - Present Carotid bruit ICD-10-CM: R09.89 ICD-9-CM: 785.9  8/31/2011 - Present Claudication [facility]) ICD-10-CM: I73.9 ICD-9-CM: 443.9  8/31/2011 - Present Weakness of left arm ICD-10-CM: R29.898 ICD-9-CM: 729.89  8/31/2011 - Present Hyperlipidemia ICD-10-CM: E78.5 ICD-9-CM: 272.4  1/27/2010 - Present DM (diabetes mellitus) ([facility].) ICD-10-CM: E11.9 ICD-9-CM: 250.00  9/2/2009 - Present Hypothyroid ICD-10-CM: E03.9 ICD-9-CM: 244.9  9/2/2009 - Present Colon cancer [facility]) ICD-10-CM: C18.9 ICD-9-CM: 153.9  9/2/2009 - Present H/O: CVA ICD-9-CM: V12.54  9/2/2009 - Present Microalbuminuria ICD-10-CM: R80.9 ICD-9-CM: 791.0  9/2/2009 - Present Age-related osteoporosis without current pathological fracture ICD-10-CM: M81.0 ICD-9-CM: 733.01  9/2/2009 - Present Essential hypertension ICD-10-CM: I10 
ICD-9-CM: 401.9  9/2/2009 - Present Anemia ICD-10-CM: D64.9 ICD-9-CM: 285.9  9/2/2009 - Present CDMP Checked:  
Yes x PROBLEM LIST Updated: 
Yes x Signed:  
Jeffrey Og MD 
5/25/2018 
9:42 AM 
 
  
 
  
  
  
8bit Announcement We are excited to announce that we are making your provider's discharge notes available to you in 8bit. You will see these notes when they are completed and signed by the physician that discharged you from your recent hospital stay. If you have any questions or concerns about any information you see in 8bit, please call the Health Information Department where you were seen or reach out to your Primary Care Provider for more information about your plan of care. Introducing [facility] [place] [facility]! [facility] introduces 8bit patient portal. Now you can access parts of your medical record, email your doctor's office, and request medication refills online. 1. In your internet browser, go to [email or web address] 2. Click on the First Time User? Click Here link in the Sign In box. You will see the New Member Sign Up page. 3. Enter your 8bit Access Code exactly as it appears below. You will not need to use this code after youve completed the sign-up process. If you do not sign up before the expiration date, you must request a new code. · 8bit Access Code: [email or web address] Expires: 8/9/2018 10:02 AM 
 
4.  Enter the last four digits of your Social Security Number (xxxx) and Date of Birth (mm/dd/yyyy) as indicated and click Submit. You will be taken to the next sign-up page. 5. Create a KILTR ID. This will be your KILTR login ID and cannot be changed, so think of one that is secure and easy to remember. 6. Create a LetGivet password. You can change your password at any time. 7. Enter your Password Reset Question and Answer. This can be used at a later time if you forget your password. 8. Enter your e-mail address. You will receive e-mail notification when new information is available in [address] 9. Click Sign Up. You can now view and download portions of your medical record. 10. Click the Download Summary menu link to download a portable copy of your medical information. If you have questions, please visit the Frequently Asked Questions section of the KILTR website. Remember, KILTR is NOT to be used for urgent needs. For medical emergencies, dial 911. Now available from your iPhone and Android! Introducing Eliot Vazquez As a Shay Dutta patient, I wanted to make you aware of our electronic visit tool called Eliot Jovannynahomipercy. Shay Sprouzofia 24/7 allows you to connect within minutes with a medical provider 24 hours a day, seven days a week via a mobile device or tablet or logging into a secure website from your computer. You can access Eliot Vazquez from anywhere in the [place]. A virtual visit might be right for you when you have a simple condition and feel like you just dont want to get out of bed, or cant get away from work for an appointment, when your regular Shay Durantt provider is not available (evenings, weekends or holidays), or when youre out of town and need minor care. Electronic visits cost only $49 and if the Shay EpiVaxt 24/7 provider determines a prescription is needed to treat your condition, one can be electronically transmitted to a nearby pharmacy*. Please take a moment to enroll today if you have not already done so. The enrollment process is free and takes just a few minutes. To enroll, please download the [employer] 24/7 teresita to your tablet or phone, or visit [email or web address] to enroll on your computer. And, as an [address] [place] [address] patient with a [employer] account, the results of your visits will be scanned into your electronic medical record and your primary care provider will be able to view the scanned results. We urge you to continue to see your regular [employer] provider for your ongoing medical care. And while your primary care provider may not be the one available when you seek a Kwestr virtual visit, the peace of mind you get from getting a real diagnosis real time can be priceless. For more information on Kwestr, view our Frequently Asked Questions (FAQs) at [email or web address]. Sincerely, 
 
Tierra Hawk MD 
Chief Medical Officer [facility] Loreto Sellers *:  certain medications cannot be prescribed via Kwestr Providers Seen During Your Hospitalization Provider Specialty Primary office phone Albertine Gitelman, MD Emergency Medicine [phone] Ella Marrufo MD Internal Medicine [phone] Abby Turk MD Internal Medicine [phone] Zena Dey MD Internal Medicine [phone] Katty Farooq MD Hospitalist [phone] Maribel Mccord MD Internal Medicine [phone] Foster Bustamante MD Hospitalist [phone] Your Primary Care Physician (PCP) Primary Care Physician Office Phone Office Fax Lisette oT [phone] You are allergic to the following Allergen Reactions Statins-Hmg-Coa Reductase Inhibitors Other (comments) Intolerant to statins Sulfa (Sulfonamide Antibiotics) Other (comments)  
 syncope Recent Documentation Weight Breastfeeding? BMI OB Status Smoking Status 54.5 kg No 22.7 kg/m2 Hysterectomy Never Smoker Emergency Contacts Name Discharge Info Relation Home Work Mobile [address] CAREGIVER [3] Son [22] [phone] Patient Belongings The following personal items are in your possession at time of discharge: 
  Dental Appliances: None  Visual Aid: None      Home Medications: None   Jewelry: None  Clothing: With patient Discharge Instructions Attachments/References HEART FAILURE: AVOIDING TRIGGERS (ENGLISH) Patient Handouts Avoiding Triggers With Heart Failure: Care Instructions Your Care Instructions Triggers are anything that make your heart failure flare up. A flare-up is also called \"sudden heart failure\" or \"acute heart failure. \" When you have a flare-up, fluid builds up in your lungs, and you have problems breathing. You might need to go to the hospital. By watching for changes in your condition and avoiding triggers, you can prevent heart failure flare-ups. Follow-up care is a key part of your treatment and safety. Be sure to make and go to all appointments, and call your doctor if you are having problems. It's also a good idea to know your test results and keep a list of the medicines you take. How can you care for yourself at home? Watch for changes in your weight and condition · Weigh yourself without clothing at the same time each day. Record your weight. Call your doctor if you have sudden weight gain, such as more than 2 to 3 pounds in a day or 5 pounds in a week. (Your doctor may suggest a different range of weight gain.) A sudden weight gain may mean that your heart failure is getting worse. · Keep a daily record of your symptoms. Write down any changes in how you feel, such as new shortness of breath, cough, or problems eating.  Also record if your ankles are more swollen than usual and if you feel more tired than usual. Note anything that you ate or did that could have triggered these changes. Limit sodium Sodium causes your body to hold on to extra water. This may cause your heart failure symptoms to get worse. People get most of their sodium from processed foods. Fast food and restaurant meals also tend to be very high in sodium. · Your doctor may suggest that you limit sodium to 2,000 milligrams (mg) a day or less. That is less than 1 teaspoon of salt a day, including all the salt you eat in cooking or in packaged foods. · Read food labels on cans and food packages. They tell you how much sodium you get in one serving. Check the serving size. If you eat more than one serving, you are getting more sodium. · Be aware that sodium can come in forms other than salt, including monosodium glutamate (MSG), sodium citrate, and sodium bicarbonate (baking soda). MSG is often added to Asian food. You can sometimes ask for food without MSG or salt. · Slowly reducing salt will help you adjust to the taste. Take the salt shaker off the table. · Flavor your food with garlic, lemon juice, onion, vinegar, herbs, and spices instead of salt. Do not use soy sauce, steak sauce, onion salt, garlic salt, mustard, or ketchup on your food, unless it is labeled \"low-sodium\" or \"low-salt. \" 
· Make your own salad dressings, sauces, and ketchup without adding salt. · Use fresh or frozen ingredients, instead of canned ones, whenever you can. Choose low-sodium canned goods. · Eat less processed food and food from restaurants, including fast food. Exercise as directed Moderate, regular exercise is very good for your heart. It improves your blood flow and helps control your weight. But too much exercise can stress your heart and cause a heart failure flare-up. · Check with your doctor before you start an exercise program. 
· Walking is an easy way to get exercise. Start out slowly.  Gradually increase the length and pace of your walk. Swimming, riding a bike, and using a treadmill are also good forms of exercise. · When you exercise, watch for signs that your heart is working too hard. You are pushing yourself too hard if you cannot talk while you are exercising. If you become short of breath or dizzy or have chest pain, stop, sit down, and rest. 
· Do not exercise when you do not feel well. Take medicines correctly · Take your medicines exactly as prescribed. Call your doctor if you think you are having a problem with your medicine. · Make a list of all the medicines you take. Include those prescribed to you by other doctors and any over-the-counter medicines, vitamins, or supplements you take. Take this list with you when you go to any doctor. · Take your medicines at the same time every day. It may help you to post a list of all the medicines you take every day and what time of day you take them. · Make taking your medicine as simple as you can. Plan times to take your medicines when you are doing other things, such as eating a meal or getting ready for bed. This will make it easier to remember to take your medicines. · Get organized. Use helpful tools, such as daily or weekly pill containers. When should you call for help? Call 911 if you have symptoms of sudden heart failure such as: 
? · You have severe trouble breathing. ? · You cough up pink, foamy mucus. ? · You have a new irregular or rapid heartbeat. ?Call your doctor now or seek immediate medical care if: 
? · You have new or increased shortness of breath. ? · You are dizzy or lightheaded, or you feel like you may faint. ? · You have sudden weight gain, such as more than 2 to 3 pounds in a day or 5 pounds in a week. (Your doctor may suggest a different range of weight gain.) ? · You have increased swelling in your legs, ankles, or feet. ? · You are suddenly so tired or weak that you cannot do your usual activities. ?Watch closely for changes in your health, and be sure to contact your doctor if you develop new symptoms. Where can you learn more? Go to http://david-noni.info/. Enter F986 in the search box to learn more about \"Avoiding Triggers With Heart Failure: Care Instructions. \" Current as of: September 21, 2016 Content Version: 11.4 © 5634-0533 [employer]. Care instructions adapted under license by [employer] (which disclaims liability or warranty for this information). If you have questions about a medical condition or this instruction, always ask your healthcare professional. [address] any warranty or liability for your use of this information. Please provide this summary of care documentation to your next provider. Signatures-by signing, you are acknowledging that this After Visit Summary has been reviewed with you and you have received a copy. Patient Signature:  ____________________________________________________________ Date:  ____________________________________________________________  
  
Verónica Green Provider Signature:  ____________________________________________________________ Date:  ____________________________________________________________

## 2018-05-19 NOTE — PROGRESS NOTES
Received a call from the lab that pt's troponin went up to 14.8. I called and spoke to Dr. José West who ordered cardiology consult and an EKG. EKG performed and uploaded. I also spoke to Dr. Jaky Deshpande about the patient. She is hemodynamically stable and denies chest pain but is very sleepy. She will wake up with verbal command and answers all questions appropriately but then falls back asleep. Right lung diminished left lung very coarse/crackles.

## 2018-05-19 NOTE — ROUTINE PROCESS
TRANSFER - IN REPORT:    Verbal report received from RN (name) on Myrl Loots  being received from ED (unit) for routine progression of care      Report consisted of patients Situation, Background, Assessment and   Recommendations(SBAR). Information from the following report(s) SBAR, ED Summary, Med Rec Status and Cardiac Rhythm NSR was reviewed with the receiving nurse. Opportunity for questions and clarification was provided. Assessment completed upon patients arrival to unit and care assumed. Bedside shift change report given to [address] (oncoming nurse) by Sebastien Recio (offgoing nurse). Report included the following information SBAR, ED Summary, Intake/Output and Cardiac Rhythm NSR.

## 2018-05-19 NOTE — CONSULTS
Patient is seen  She is sleeping  Troponin elevation  Son wants DNR DNI but wants meds and cath next week  Will continue with current meds and he will talk to Dr Noble Jiménez about cath   Son is madyson, flying back from [facility]

## 2018-05-19 NOTE — H&P
[facility]  HISTORY AND PHYSICAL      Jeni Gaytan.  MR#: [id number]  : 1935  ACCOUNT #: [de-identified]   ADMIT DATE: 2018    CHIEF COMPLAINT:  Unable to obtain. HISTORY OF PRESENT ILLNESS:  The patient is an 82-year-old  female with multiple past medical history, including diabetes mellitus, rheumatoid arthritis, metastatic left breast cancer, bilateral pleural effusions, status post thoracocentesis, non-ST elevation myocardial infarction, anemia, hypothyroidism and gastroesophageal reflux disease. She was recently admitted here at [facility], and she was in the hospital for a prolonged period of time, from  and was discharged on , for NSTEMI and bilateral pleural effusion. The patient was discharged to [facility]. Early this morning, she was again brought from [facility] to the emergency department, because she was found to be short of breath and agitated. The rest of the history could not be obtained. EMS found the patient to be tachycardic and diaphoretic. In the emergency department, she had a chest x-ray done, which showed cardiomegaly with congestive heart failure, interstitial pulmonary edema and bilateral pleural effusion. She subsequently had a CT scan of the chest done without contrast, which showed bilateral pleural effusion, with overlying atelectasis, pericardial effusion and coronary artery disease, diffuse sclerotic and some lucent skeletal metastasis, left breast mass on the left breast skin thickening concerning for breast neoplasm, nonobstructing left early systolic staghorn calculus. Initial troponin drawn was 1.55. The patient was administered 80 mg of IV Lasix in the ER, and she was also given 5 units of regular insulin for hyperglycemia. We have been consulted for hospital admission and for evaluation.       RECENT HOSPITALIZATION:  As mentioned above, she was recently admitted from 2018 and discharged on 05/11/2018, for NSTEMI hypoxic respiratory failure and bilateral pleural effusion requiring thoracentesis. PAST SURGICAL HISTORY:  She is status post colectomy, status post hysterectomy. HOME MEDICATIONS:  Includes:  1. Anastrozole mg 1 tablet by mouth daily. 2.  Ascorbate calcium 1000 mg by mouth daily. 3.  Aspirin 81 mg daily. 4.  Coreg 6.25 mg 2 times a day. 5.  Cholecalciferol 1000 units daily. 6.  Colace 1 tablet 2 times a day. 7.  Erythropoietin 1 mL subcutaneous route every Monday, Wednesday and Friday. 8.  Etanercept 1 mL subQ every 7 days. 9.  Evolocumab 1 mL subQ route every 14 days. 10.  Folic acid 1 mg tablet by mouth daily. 11.  Lasix 20 mg tablet daily. 12.  Glimepiride 1 mg tablet by mouth every morning. 13.  Elise-Q.  14.  Synthroid 88 mcg daily. 15.  Lisinopril 2.5 mg daily. 16.  Magnesium malate 400 mg by mouth daily. 17.  Multivitamins. 18.  Omega 3 fatty acids. 18.  Phosphorus K/Neutra-Phos. 19.  Polyethylene glycol. 20.  Sitagliptin 50 mg daily. 21.  Spironolactone 25 mg daily. 22.  Vitamin C, E/(Ocuvite) p.o. ALLERGIES:  SHE IS ALLERGIC TO STATINS, HMG-COA REDUCTASE INHIBITORS, SULFONAMIDE ANTIBIOTICS -- REACTION SYNCOPE. FAMILY HISTORY:  This was unobtainable due to current factors, but upon review of documentation, family history is significant for diabetes in the mother, stroke in the mother, diabetes in the siblings and stomach cancer in the father. SOCIAL HISTORY:  She is currently a resident of [facility]. She is . She has 3 children, of whom Mr. Germania Bal is the surrogate decision maker, his phone number is [phone]. He says he would want to continue medical care but no intubation or chest compressions. REVIEW OF SYSTEMS:  This was all unobtainable due to patient factors.     PHYSICAL EXAMINATION:  VITAL SIGNS:  Triage vital signs were temperature of 97.7, pulse 126 beats per minute, blood pressure 154/63, respiration is 24, oxygen saturation is 92% on room air. GENERAL APPEARANCE:  She appears to be deeply somnolent, but she is not in any obvious distress. She looks her stated age. HEENT:  Atraumatic, normocephalic. Oral mucosa appears dry. NECK:  Supple. RESPIRATORY: She has good air entry bilaterally, but these are reduced at the bases. CARDIOVASCULAR:  S1 and S2 heard. Regular rate and rhythm. No gallops or murmurs. ABDOMEN:  Soft, nontender. EXTREMITIES:  No pitting pedal edema, +1 dorsalis pedis pulses. NEUROLOGIC:  She is somnolent, but easily arousable. LABORATORY DATA:  WBC is 8.1, hemoglobin 10.7, platelets 194, neutrophils 70. Chemistry:  Sodium of 132, potassium 4.4, chloride 99, bicarbonate 26, anion gap 7, glucose 336, BUN 20, creatinine 0.97, phosphorus is 4.1, magnesium 1.8, ALT 19, AST 25, alkaline phosphatase 731. Troponin 1.55. NT-proBNP is 16,447. DIAGNOSTIC DATA:  Chest x-ray and CT scan as mentioned above. EKG shows normal sinus rhythm at 79 beats per minute, with ST and T-wave changes in the anterior lateral leads. No ST elevation. Echocardiogram done on 04/26/2018 showed ejection fraction of 45%. ASSESSMENT AND PLAN:  The patient is an 27-year-old female, who was recently discharged from the hospital after a prolonged hospital admission. She presents again from [facility] on account of agitation and shortness of breath. 1.  Acute on chronic systolic heart failure present on admission, New [place] Heart Association class is undetermined at this time. Prior echocardiogram done in April showed an ejection fraction of 44%. Today, chest x-ray shows signs of congestive heart failure and bilateral pleural effusion.   CT scan of the chest also confirmed similar findings of bilateral pleural effusions and pericardial effusion, with diffuse metastatic disease of the left breast.  She was given 80 mg of IV Lasix in the emergency room with good diuresis. We will continue with Lasix at 40 mg IV daily. We will resume home Coreg and low dose lisinopril. The patient is ALLERGIC TO STATINS. We will monitor her weight daily. 2.  Bilateral pleural effusion. I suspect this is most likely related to metastatic disease in the setting of congestive heart failure. I would suggest recommending a PleurX catheter for draining of pleural effusion. But right now, she is hemodynamically stable. Her oxygen saturation is stable. We will consider consulting Pulmonary for insertion of PleurX catheter. 3.  Shortness of breath as mentioned above. 4.  Elevated troponin, possibly due to non-ST elevation myocardial infarction versus congestive heart failure. We will continue serial troponin every 3 hours x2. If troponin continues to trend up, we will consult Cardiology for evaluation. 5.  Metastatic left breast cancer. She is currently undergoing palliative chemotherapy. Prognosis appears poor. 6.  Diabetes mellitus type 2, with hyperglycemia. We will start her on insulin sliding scale as per protocol. We will monitor her blood sugar closely. 7.  Hypothyroidism. We will resume home Synthroid. 8.  Anemia. This also appears to be chronic. Hemoglobin and hematocrit are stable. 9.  Deep venous thrombosis prophylaxis, Lovenox 40 mg subcutaneously daily. DISPOSITION:  The patient will be admitted to cardiac floor for routine progression of care. The patient is significantly ill and she is at risk of clinical deterioration.       MD FREDDIE Horta/BRUNA  D: 05/19/2018 12:18     T: 05/19/2018 13:13  JOB #: [id number]

## 2018-05-19 NOTE — ED TRIAGE NOTES
Arrived via ems from [facility] reporting SOB around 2000 per EMS. At 0100, increased SOB. Patient Tachycardic and diaphoretic upon arrival.    Given 1 breathing treatment en route.

## 2018-05-19 NOTE — ED PROVIDER NOTES
HPI Comments: The patient is an 72-year-old female with multiple chronic medical conditions includes metastatic breast cancer, chronic, bilateral pleural effusion, NSTEMI, anemia, diabetes, rheumatoid arthritis, hypothyroidism, GERD, status post colectomy, hysterectomy, and a recent discharge from the hospital one week ago after an extensive stay injury which time the patient was found to be in respiratory surgery secondary to CHF, sepsis, chronic kidney disease, pleural effusion, requiring thoracentesis, who presents to the ED by EMS from [facility] for evaluation for respiratory distress and left shoulder/back pain. The patient is a limited historian. The patient's son, who is at the bedside state that the patient began complaining of left upper shoulder and back pain. Approximately 3 hours ago. She was given a dose of Tylenol. Approximately an hour later, the patient went into a mild amount of menstrual distress, became very anxious, nauseous, and diaphoretic. She was given 1 mg of Xanax without any relief of symptoms. EMS was notified and the patient was given DuoNeb in route to the hospital. The patient denies any headache, neck pain, back pain, chest pain, shortness of breath, nausea, vomiting, abdominal pain, diarrhea, constipation, dysuria, hematuria, dizziness, weakness, numbness. The patient is mostly bedridden.        Past Medical History:   Diagnosis Date    Anemia 9/2/2009    Colon cancer ([facility] [address] 9/2/2009    surgery/chemo    Colon cancer ([facility] [address] 9/2/2009    DM (diabetes mellitus) ([facility] [address] 9/2/2009    GERD (gastroesophageal reflux disease)     H/O: CVA 9/2/2009    slight l sided weakness    Hypothyroid 9/2/2009    Ill-defined condition     seasonal allergies    Microalbuminuria 9/2/2009    Osteoporosis 9/2/2009    Other and unspecified hyperlipidemia 1/27/2010    Rheumatoid arthritis involving ankle ([address] 9/28/2016    Rheumatoid arthritis(714.0) 9/2/2009    Unspecified essential hypertension 2009    Weakness due to cerebrovascular accident        Past Surgical History:   Procedure Laterality Date    HX COLECTOMY      HX HYSTERECTOMY      HX OTHER SURGICAL      colonoscopies numerous since          Family History:   Problem Relation Age of Onset    Diabetes Mother     Stroke Mother     Diabetes Sister     Other Sister      fell and hit her head -  of this   [facility] Diabetes Brother     Cancer Father      stomach    Diabetes Sister        Social History     Social History    Marital status:      Spouse name: N/A    Number of children: N/A    Years of education: N/A     Occupational History    Not on file. Social History Main Topics    Smoking status: Never Smoker    Smokeless tobacco: Never Used    Alcohol use No    Drug use: No    Sexual activity: Not Currently     Partners: Male     Other Topics Concern    Not on file     Social History Narrative         ALLERGIES: Statins-hmg-coa reductase inhibitors and Sulfa (sulfonamide antibiotics)    Review of Systems   All other systems reviewed and are negative. Vitals:    18 0211   BP: 154/63   Pulse: (!) 126   Resp: 24   Temp: 97.7 °F (36.5 °C)   SpO2: 92%            Physical Exam   Nursing note and vitals reviewed. CONSTITUTIONAL: Well-appearing; well-nourished; in mild distress  HEAD: Normocephalic; atraumatic  EYES: PERRL; EOM intact; conjunctiva and sclera are clear bilaterally. ENT: No rhinorrhea; normal pharynx with no tonsillar hypertrophy; mucous membranes pink/moist, no erythema, no exudate. NECK: Supple; non-tender; no cervical lymphadenopathy  CARD: Normal S1, S2; no murmurs, rubs, or gallops. Increased Regular rate and rhythm. RESP: Increased respiratory effort; Coarse breath sounds equal bilaterally with scattered rhonchi; but no wheezes. rales. ABD: Normal bowel sounds; non-distended; non-tender; no palpable organomegaly, no masses, no bruits.   Back Exam: Normal inspection; no vertebral point tenderness, no CVA tenderness. Normal range of motion. EXT: Normal ROM in all four extremities; non-tender to palpation; no swelling or deformity; distal pulses are normal, no edema. SKIN: Warm; dry; no rash. NEURO:Alert and oriented x 3, coherent, KARLIE-XII grossly intact, sensory and motor are non-focal.        MDM  Number of Diagnoses or Management Options  Diagnosis management comments: Assessment: acute on chronic respiratory failure with hypoxia/ pulmonary edema/ bilateral effusion. CODE STATUS: on discussion with the son who is the patient's POA, and they Limited intervention without any intubation and CPR      Plan: EKG/ chest x-ray/ lab/ Lasix/ respiratory support with supplemental oxygen/ CT scan of the chest, abdomen, and pelvis/ severe exam/ Monitor and Reevaluate. Amount and/or Complexity of Data Reviewed  Clinical lab tests: ordered and reviewed  Tests in the radiology section of CPT®: ordered and reviewed  Tests in the medicine section of CPT®: ordered and reviewed  Discussion of test results with the performing providers: yes  Decide to obtain previous medical records or to obtain history from someone other than the patient: yes  Obtain history from someone other than the patient: yes  Review and summarize past medical records: yes  Discuss the patient with other providers: yes  Independent visualization of images, tracings, or specimens: yes    Risk of Complications, Morbidity, and/or Mortality  Presenting problems: moderate  Diagnostic procedures: moderate  Management options: moderate    Critical Care  Total time providing critical care: (Total critical care time spent exclusive of procedures: 45 minutes)    Patient Progress  Patient progress: stable        ED Course       Procedures     ED EKG interpretation:  Rhythm: sinus tachycardia; and regular .  Rate (approx.): 125; Axis: normal; P wave: normal; QRS interval: normal ; ST/T wave: non-specific changes; in  Lead: Diffusely PACs; Other findings: abnormal ekg. Unchanged since April 24, 2018. This EKG was interpreted by Forest Ordonez MD,ED Provider. XRAY INTERPRETATION (ED MD)  Chest Xray  Cardiomegaly with congestive failure, interstitial pulmonary edema and bilateral pleural effusions. No focal infiltrate/ consolidation. Forest Ordonez MD 2:18 AM      PROGRESS NOTE:  Pt has been reexamined by Forest Ordonez MD all available results have been reviewed with pt and any available family. Pt understands sx, dx, and tx in ED. Care plan has been outlined and questions have been answered. Pt and any available family understands and agrees to need for admission to hospital for further tx not available in ED. Pt is ready for admission. Written by Forest Ordonez MD,  2:19 AM    CONSULT NOTE:  Forest Ordonez MD spoke with Dr. Arnoldo Tamayo of the adult hospitalist team. Discussed patient's presentation, history, physical assessment, and available diagnostic results.  He will evaluate, write orders and admit the patient to the hospital. 5:23 AM    .

## 2018-05-19 NOTE — ROUTINE PROCESS
TRANSFER - OUT REPORT:    [facility]name[facility] on Wanda Bradley  being transferred to [facility](unit) for routine progression of care       Report consisted of patients Situation, Background, Assessment and   Recommendations(SBAR). Information from the following report(s) SBAR, ED Summary, MAR, Recent Results and Cardiac Rhythm NSR was reviewed with the receiving nurse. Opportunity for questions and clarification was provided. Patient transported with:   Monitor  Registered Nurse    7700: Pt placed to room with assistant from [facility].  Klarissa Baca RN and charge nurse made aware of patient in room 749-97

## 2018-05-20 LAB
ANION GAP SERPL CALC-SCNC: 7 MMOL/L (ref 5–15)
BACTERIA SPEC CULT: NORMAL
BACTERIA SPEC CULT: NORMAL
BASOPHILS # BLD: 0 K/UL (ref 0–0.1)
BASOPHILS NFR BLD: 1 % (ref 0–1)
BUN SERPL-MCNC: 25 MG/DL (ref 6–20)
BUN/CREAT SERPL: 24 (ref 12–20)
CALCIUM SERPL-MCNC: 8.1 MG/DL (ref 8.5–10.1)
CHLORIDE SERPL-SCNC: 102 MMOL/L (ref 97–108)
CO2 SERPL-SCNC: 26 MMOL/L (ref 21–32)
CREAT SERPL-MCNC: 1.05 MG/DL (ref 0.55–1.02)
DIFFERENTIAL METHOD BLD: ABNORMAL
EOSINOPHIL # BLD: 0.3 K/UL (ref 0–0.4)
EOSINOPHIL NFR BLD: 4 % (ref 0–7)
ERYTHROCYTE [DISTWIDTH] IN BLOOD BY AUTOMATED COUNT: 19.8 % (ref 11.5–14.5)
GLUCOSE BLD STRIP.AUTO-MCNC: 123 MG/DL (ref 65–100)
GLUCOSE BLD STRIP.AUTO-MCNC: 191 MG/DL (ref 65–100)
GLUCOSE BLD STRIP.AUTO-MCNC: 198 MG/DL (ref 65–100)
GLUCOSE BLD STRIP.AUTO-MCNC: 199 MG/DL (ref 65–100)
GLUCOSE SERPL-MCNC: 156 MG/DL (ref 65–100)
HCT VFR BLD AUTO: 29.9 % (ref 35–47)
HGB BLD-MCNC: 9.3 G/DL (ref 11.5–16)
IMM GRANULOCYTES # BLD: 0 K/UL (ref 0–0.04)
IMM GRANULOCYTES NFR BLD AUTO: 0 % (ref 0–0.5)
LYMPHOCYTES # BLD: 1.5 K/UL (ref 0.8–3.5)
LYMPHOCYTES NFR BLD: 24 % (ref 12–49)
MAGNESIUM SERPL-MCNC: 1.6 MG/DL (ref 1.6–2.4)
MCH RBC QN AUTO: 27.4 PG (ref 26–34)
MCHC RBC AUTO-ENTMCNC: 31.1 G/DL (ref 30–36.5)
MCV RBC AUTO: 88.2 FL (ref 80–99)
MONOCYTES # BLD: 0.8 K/UL (ref 0–1)
MONOCYTES NFR BLD: 13 % (ref 5–13)
NEUTS SEG # BLD: 3.5 K/UL (ref 1.8–8)
NEUTS SEG NFR BLD: 58 % (ref 32–75)
NRBC # BLD: 0 K/UL (ref 0–0.01)
NRBC BLD-RTO: 0 PER 100 WBC
PLATELET # BLD AUTO: 266 K/UL (ref 150–400)
PMV BLD AUTO: 9.5 FL (ref 8.9–12.9)
POTASSIUM SERPL-SCNC: 4.1 MMOL/L (ref 3.5–5.1)
RBC # BLD AUTO: 3.39 M/UL (ref 3.8–5.2)
SERVICE CMNT-IMP: ABNORMAL
SERVICE CMNT-IMP: NORMAL
SODIUM SERPL-SCNC: 135 MMOL/L (ref 136–145)
TSH SERPL DL<=0.05 MIU/L-ACNC: 5.75 UIU/ML (ref 0.36–3.74)
WBC # BLD AUTO: 6.2 K/UL (ref 3.6–11)

## 2018-05-20 PROCEDURE — 74011250637 HC RX REV CODE- 250/637: Performed by: HOSPITALIST

## 2018-05-20 PROCEDURE — 74011250636 HC RX REV CODE- 250/636: Performed by: HOSPITALIST

## 2018-05-20 PROCEDURE — 84481 FREE ASSAY (FT-3): CPT | Performed by: HOSPITALIST

## 2018-05-20 PROCEDURE — 65660000000 HC RM CCU STEPDOWN

## 2018-05-20 PROCEDURE — 80048 BASIC METABOLIC PNL TOTAL CA: CPT | Performed by: HOSPITALIST

## 2018-05-20 PROCEDURE — 74011636637 HC RX REV CODE- 636/637: Performed by: HOSPITALIST

## 2018-05-20 PROCEDURE — 82962 GLUCOSE BLOOD TEST: CPT

## 2018-05-20 PROCEDURE — 83735 ASSAY OF MAGNESIUM: CPT | Performed by: HOSPITALIST

## 2018-05-20 PROCEDURE — 36415 COLL VENOUS BLD VENIPUNCTURE: CPT | Performed by: HOSPITALIST

## 2018-05-20 PROCEDURE — 84443 ASSAY THYROID STIM HORMONE: CPT | Performed by: HOSPITALIST

## 2018-05-20 PROCEDURE — 74011250637 HC RX REV CODE- 250/637: Performed by: INTERNAL MEDICINE

## 2018-05-20 PROCEDURE — 85025 COMPLETE CBC W/AUTO DIFF WBC: CPT | Performed by: HOSPITALIST

## 2018-05-20 PROCEDURE — 93041 RHYTHM ECG TRACING: CPT

## 2018-05-20 RX ORDER — HYDROCODONE BITARTRATE AND ACETAMINOPHEN 5; 325 MG/1; MG/1
1 TABLET ORAL
Status: DISCONTINUED | OUTPATIENT
Start: 2018-05-20 | End: 2018-05-25 | Stop reason: HOSPADM

## 2018-05-20 RX ORDER — ENOXAPARIN SODIUM 100 MG/ML
50 INJECTION SUBCUTANEOUS EVERY 12 HOURS
Status: DISCONTINUED | OUTPATIENT
Start: 2018-05-20 | End: 2018-05-22

## 2018-05-20 RX ORDER — FENTANYL CITRATE 50 UG/ML
25 INJECTION, SOLUTION INTRAMUSCULAR; INTRAVENOUS
Status: DISCONTINUED | OUTPATIENT
Start: 2018-05-20 | End: 2018-05-25 | Stop reason: HOSPADM

## 2018-05-20 RX ADMIN — LISINOPRIL 2.5 MG: 5 TABLET ORAL at 08:54

## 2018-05-20 RX ADMIN — LEVOTHYROXINE SODIUM 88 MCG: 88 TABLET ORAL at 07:13

## 2018-05-20 RX ADMIN — SITAGLIPTIN 50 MG: 25 TABLET, FILM COATED ORAL at 08:52

## 2018-05-20 RX ADMIN — ANASTROZOLE 1 MG: 1 TABLET, COATED ORAL at 09:08

## 2018-05-20 RX ADMIN — POLYETHYLENE GLYCOL 3350 17 G: 17 POWDER, FOR SOLUTION ORAL at 08:53

## 2018-05-20 RX ADMIN — INSULIN LISPRO 2 UNITS: 100 INJECTION, SOLUTION INTRAVENOUS; SUBCUTANEOUS at 12:01

## 2018-05-20 RX ADMIN — ACETAMINOPHEN 650 MG: 325 TABLET ORAL at 16:33

## 2018-05-20 RX ADMIN — INSULIN LISPRO 2 UNITS: 100 INJECTION, SOLUTION INTRAVENOUS; SUBCUTANEOUS at 16:33

## 2018-05-20 RX ADMIN — ASPIRIN 81 MG: 81 TABLET, COATED ORAL at 08:53

## 2018-05-20 RX ADMIN — ENOXAPARIN SODIUM 60 MG: 100 INJECTION SUBCUTANEOUS at 03:58

## 2018-05-20 RX ADMIN — DOCUSATE SODIUM 100 MG: 100 CAPSULE, LIQUID FILLED ORAL at 08:53

## 2018-05-20 RX ADMIN — ENOXAPARIN SODIUM 50 MG: 60 INJECTION SUBCUTANEOUS at 16:33

## 2018-05-20 RX ADMIN — FUROSEMIDE 40 MG: 10 INJECTION, SOLUTION INTRAMUSCULAR; INTRAVENOUS at 08:53

## 2018-05-20 RX ADMIN — CARVEDILOL 6.25 MG: 6.25 TABLET, FILM COATED ORAL at 16:33

## 2018-05-20 RX ADMIN — CARVEDILOL 6.25 MG: 6.25 TABLET, FILM COATED ORAL at 08:53

## 2018-05-20 RX ADMIN — FOLIC ACID 1 MG: 1 TABLET ORAL at 08:53

## 2018-05-20 RX ADMIN — DOCUSATE SODIUM 100 MG: 100 CAPSULE, LIQUID FILLED ORAL at 17:48

## 2018-05-20 RX ADMIN — SPIRONOLACTONE 25 MG: 25 TABLET, FILM COATED ORAL at 08:52

## 2018-05-20 RX ADMIN — Medication 1 CAPSULE: at 08:53

## 2018-05-20 RX ADMIN — GLIMEPIRIDE 1 MG: 1 TABLET ORAL at 07:13

## 2018-05-20 NOTE — PROGRESS NOTES
Bedside and Verbal shift change report given to [facility]. Report included the following information SBAR. Troponin > 20 on third check. I updated Dr. Brianne Barrios. No change at this time and no need to recheck troponin. Considering cardiac cath on Monday.

## 2018-05-20 NOTE — ROUTINE PROCESS
Bedside and Verbal shift change report given to Tash Wen (oncoming nurse) by Levy Machuca (offgoing nurse). Report included the following information SBAR KARDEX, NSR, REcent results.

## 2018-05-20 NOTE — PROGRESS NOTES
Neil Palomo MD   Phone/text: [phone] (7am to 7pm)  After 7pm please call  for hospitalist on call    Hospitalist Progress Note     5/20/2018   PCP:  Dr. Lily Marquez MD  Chief Complaint   Patient presents with    Shortness of Breath       Admission Summary:   The patient is an 82-year-old  female with multiple past medical history, including diabetes mellitus, rheumatoid arthritis, metastatic left breast cancer, bilateral pleural effusions, status post thoracocentesis, non-ST elevation myocardial infarction, anemia, hypothyroidism and gastroesophageal reflux disease. She was recently admitted here at [facility], and she was in the hospital for a prolonged period of time, from 04/16 and was discharged on 05/11, for NSTEMI and bilateral pleural effusion. The patient was discharged to [facility]. Early this morning, she was again brought from [facility] to the emergency department, because she was found to be short of breath and agitated. Reason For Visit:  NSTEMI, heart failure    Assessment/Plan   Acute on chronic systolic heart failure (POA) - Joel Conley on presentation  - CXR 5/19 with bilateral pleural effusion L>R, diffuse interstitial edema  - Echo 4/26 with mild hypokinesis of the basal-mid anteroseptal and apical septal walls, moderate MR, small pericardial effusion  - Continue lasix IV for diuresis  - Continue carvedilol, lisinopril, spironolactone  - Cardiology consulted    NSTEMI (POA) - apparently patient refused cath on last admission but she is now agreeable if she has medication for anxiety  - ECG 5/19 with sinus rhythm, old q waves in the anterior leads, old t-wave inversions in the lateral leads.  ST depression no longer present in the inferior leads  - Troponin markedly elevated  - Continue aspirin, carvedilol, enoxaparin  - Cardiology consulted    Bilateral pleural effusions (POA) - suspect malignant but cytology from thoracentesis  negative, looks stable compared to 2 weeks ago  - CT chest  with bilateral pleural effusions, pericardial effusion, skeletal mets, left breast mass, left staghorn calculus in the left kidney    Hypoxia (POA) - due to heart failure, pleural effusions, NSTEMI  - Supplemental O2    Metastatic left breast cancerER/OK +, Her-2 - (chronic)  - Continue anastrozole    DM2 with hyperglycemia - recent A1c 7, stable  - Continue januvia, amaryl  - SSI as needed    Hypothyroidism (chronic)  - Check TSH  - Continue synthroid    Anemia (chronic) - stable    RA (chronic) - receives outpatient etanercept    HLD (chronic) - statin allergy, receives outpatient evolocumab    See orders for other plans. VTE prophylaxis: enoxaparin  Discussed plan of care with Patient/Family and Nurse   Pre-admission lived at home but was at [address] [place] [address]  Discharge plan: back to [facility]  Estimated time to discharge: unclear     Subjective   Ms. Ledesma is feeling better. Her SOB has improved and she denies dyspnea at rest. No chest pain, palpitations, sweating or nausea. She does report some dizziness when she was at [address] [place] [address]. Reviewed interval history  Physical examination     Visit Vitals    /62 (BP 1 Location: Right arm, BP Patient Position: At rest)    Pulse 83    Temp 97.2 °F (36.2 °C)    Resp 16    Wt 52.3 kg (115 lb 4.8 oz)    SpO2 98%    Breastfeeding No    BMI 21.79 kg/m2       Temp (24hrs), Av.6 °F (36.4 °C), Min:96.9 °F (36.1 °C), Max:98.2 °F (36.8 °C)      Intake/Output Summary (Last 24 hours) at 18 0836  Last data filed at 18 0615   Gross per 24 hour   Intake              480 ml   Output             1075 ml   Net             -595 ml       Gen - NAD  HEENT - MMM  Neck - supple, full ROM  CV - RRR, +s4, 2/6 systolic murmur  Resp - lungs decreased at bases with crackles, no wheezing, normal WOB  GI - abdomen S, NT, ND, +BS.  No hepatosplenomegaly   - no CVA tenderness, bladder non-palpable in lower abdomen  MSK - normal tone and bulk, no edema  Neuro - A&O, no focal deficits  Psych - calm and cooperative with normal affect    Data Review       Telemetry x   ECG    Xray x   CT scan x   MRI    Echocardiogram    Ultrasound              I have reviewed the flow sheet and recent notes  New labs and data personally reviewed.     Recent Labs      05/20/18   0359  05/19/18   0230   WBC  6.2  8.1   HGB  9.3*  10.7*   HCT  29.9*  34.7*   PLT  266  343     Recent Labs      05/20/18   0359  05/19/18   0310   NA  135*  132*   K  4.1  4.4   CL  102  99   CO2  26  26   GLU  156*  336*   BUN  25*  20   CREA  1.05*  0.97   CA  8.1*  8.8   MG  1.6  1.8   PHOS   --   4.1   ALB   --   2.2*   TBILI   --   0.4   SGOT   --   25   ALT   --   19   INR   --   1.1       Medications reviewed  Current Facility-Administered Medications   Medication Dose Route Frequency    acetaminophen (TYLENOL) tablet 650 mg  650 mg Oral Q4H PRN    glucose chewable tablet 16 g  4 Tab Oral PRN    dextrose (D50W) injection syrg 12.5-25 g  12.5-25 g IntraVENous PRN    glucagon (GLUCAGEN) injection 1 mg  1 mg IntraMUSCular PRN    insulin lispro (HUMALOG) injection   SubCUTAneous AC&HS    anastrozole (ARIMIDEX) tablet 1 mg  1 mg Oral DAILY    carvedilol (COREG) tablet 6.25 mg  6.25 mg Oral BID WITH MEALS    docusate sodium (COLACE) capsule 100 mg  100 mg Oral BID    levothyroxine (SYNTHROID) tablet 88 mcg  88 mcg Oral ACB    lactobac ac& pc-s.therm-b.anim (BERYL Q/RISAQUAD)  1 Cap Oral DAILY    glimepiride (AMARYL) tablet 1 mg  1 mg Oral 7am    lisinopril (PRINIVIL, ZESTRIL) tablet 2.5 mg  2.5 mg Oral DAILY    polyethylene glycol (MIRALAX) packet 17 g  17 g Oral DAILY    spironolactone (ALDACTONE) tablet 25 mg  25 mg Oral DAILY    folic acid (FOLVITE) tablet 1 mg  1 mg Oral DAILY    SITagliptin (JANUVIA) tablet tab 50 mg  50 mg Oral DAILY    aspirin delayed-release tablet 81 mg  81 mg Oral DAILY    furosemide (LASIX) injection 40 mg  40 mg IntraVENous DAILY    enoxaparin (LOVENOX) injection 60 mg  60 mg SubCUTAneous Q12H         Jay Brito MD  Internal Medicine  5/20/2018

## 2018-05-20 NOTE — PROGRESS NOTES
Lovenox Monitoring  Indication: NSTEMI   Recent Labs      05/19/18   0310  05/19/18   0230   HGB   --   10.7*   PLT   --   343   INR  1.1   --    CREA  0.97   --    Current Weight: 52 kg  Est. CrCl = 31 ml/min  Current Dose: 60 mg subcutaneously every 12 hours.   Plan: Change to 50 mg subcutaneously every 12 hours based on patients documented weight

## 2018-05-20 NOTE — PROGRESS NOTES
Problem: Falls - Risk of  Goal: *Absence of Falls  Document Yaron Fall Risk and appropriate interventions in the flowsheet.    Outcome: Progressing Towards Goal  Fall Risk Interventions:  Mobility Interventions: Assess mobility with egress test, Communicate number of staff needed for ambulation/transfer, Patient to call before getting OOB, OT consult for ADLs, PT Consult for mobility concerns, PT Consult for assist device competence         Medication Interventions: Evaluate medications/consider consulting pharmacy, Patient to call before getting OOB    Elimination Interventions: Call light in reach, Patient to call for help with toileting needs, Toileting schedule/hourly rounds

## 2018-05-20 NOTE — PROGRESS NOTES
Cardiology Progress Note         5/20/2018 12:01 PM    Admit Date: 5/19/2018    Admit Diagnosis: Acute on chronic systolic HF (heart failure) (HCC)      Subjective:     Idalia Cabrera is seen for Dr. Carlos Eduardo Thomas today. I had seen her yesterday for consultation. The patient was readmitted from [address] [place] [address] because of chest pain and was subsequently ruled in for non-ST elevation myocardial infarction. Her troponin level was as high as 24.5. She has no chest pain this morning. The patient is in good spirit. His son was supposed to be flying in from [facility] yesterday. He had asked for cardiac catheterization with Dr. Carlos Eduardo Thomas    Past Medical History:   Diagnosis Date    Anemia 9/2/2009    Colon cancer ([facility] 9/2/2009    surgery/chemo    Colon cancer ([address] 9/2/2009    DM (diabetes mellitus) ([facility] [address] 9/2/2009    GERD (gastroesophageal reflux disease)     H/O: CVA 9/2/2009    slight l sided weakness    Hypothyroid 9/2/2009    Ill-defined condition     seasonal allergies    Microalbuminuria 9/2/2009    Osteoporosis 9/2/2009    Other and unspecified hyperlipidemia 1/27/2010    Rheumatoid arthritis involving ankle ([address] 9/28/2016    Rheumatoid arthritis(714.0) 9/2/2009    Unspecified essential hypertension 9/2/2009    Weakness due to cerebrovascular accident      Past Surgical History:   Procedure Laterality Date    HX COLECTOMY      HX HYSTERECTOMY      HX OTHER SURGICAL      colonoscopies numerous since 48     Social History     Social History    Marital status:      Spouse name: N/A    Number of children: N/A    Years of education: N/A     Occupational History    Not on file.      Social History Main Topics    Smoking status: Never Smoker    Smokeless tobacco: Never Used    Alcohol use No    Drug use: No    Sexual activity: Not Currently     Partners: Male     Other Topics Concern    Not on file     Social History Narrative         Visit Vitals    /61 (BP 1 Location: Right arm, BP Patient Position: At rest)    Pulse 84    Temp 98.5 °F (36.9 °C)    Resp 16    Wt 115 lb 4.8 oz (52.3 kg)    SpO2 97%    Breastfeeding No    BMI 21.79 kg/m2     Current Facility-Administered Medications   Medication Dose Route Frequency    enoxaparin (LOVENOX) injection 50 mg  50 mg SubCUTAneous Q12H    acetaminophen (TYLENOL) tablet 650 mg  650 mg Oral Q4H PRN    glucose chewable tablet 16 g  4 Tab Oral PRN    dextrose (D50W) injection syrg 12.5-25 g  12.5-25 g IntraVENous PRN    glucagon (GLUCAGEN) injection 1 mg  1 mg IntraMUSCular PRN    insulin lispro (HUMALOG) injection   SubCUTAneous AC&HS    anastrozole (ARIMIDEX) tablet 1 mg  1 mg Oral DAILY    carvedilol (COREG) tablet 6.25 mg  6.25 mg Oral BID WITH MEALS    docusate sodium (COLACE) capsule 100 mg  100 mg Oral BID    levothyroxine (SYNTHROID) tablet 88 mcg  88 mcg Oral ACB    lactobac ac& pc-s.therm-b.anim (BERYL Q/RISAQUAD)  1 Cap Oral DAILY    glimepiride (AMARYL) tablet 1 mg  1 mg Oral 7am    lisinopril (PRINIVIL, ZESTRIL) tablet 2.5 mg  2.5 mg Oral DAILY    polyethylene glycol (MIRALAX) packet 17 g  17 g Oral DAILY    spironolactone (ALDACTONE) tablet 25 mg  25 mg Oral DAILY    folic acid (FOLVITE) tablet 1 mg  1 mg Oral DAILY    SITagliptin (JANUVIA) tablet tab 50 mg  50 mg Oral DAILY    aspirin delayed-release tablet 81 mg  81 mg Oral DAILY    furosemide (LASIX) injection 40 mg  40 mg IntraVENous DAILY         Objective:      Physical Exam:  Visit Vitals    /61 (BP 1 Location: Right arm, BP Patient Position: At rest)    Pulse 84    Temp 98.5 °F (36.9 °C)    Resp 16    Wt 115 lb 4.8 oz (52.3 kg)    SpO2 97%    Breastfeeding No    BMI 21.79 kg/m2     General Appearance:  Well developed, well nourished,alert and oriented x 3, and individual in no acute distress. Ears/Nose/Mouth/Throat:   Hearing grossly normal.         Neck: Supple.    Chest:   Lungs with basilar crackles   Cardiovascular:  Regular rate and rhythm no murmur. Abdomen:   Soft, non-tender   Extremities: trace edema bilaterally. Skin: Warm and dry. Data Review:   Labs:    Recent Results (from the past 24 hour(s))   EKG, 12 LEAD, INITIAL    Collection Time: 05/19/18 12:08 PM   Result Value Ref Range    Ventricular Rate 79 BPM    Atrial Rate 79 BPM    P-R Interval 138 ms    QRS Duration 86 ms    Q-T Interval 410 ms    QTC Calculation (Bezet) 470 ms    Calculated P Axis 49 degrees    Calculated R Axis 28 degrees    Calculated T Axis 128 degrees    Diagnosis       Normal sinus rhythm  Septal infarct (cited on or before 24-APR-2018)  ST & T wave abnormality, consider anterolateral ischemia  When compared with ECG of 19-MAY-2018 02:12,  premature atrial complexes are no longer present  Vent. rate has decreased BY  46 BPM    Confirmed by Deepika Renee MD, Marta Ybarra ([id number]) on 5/19/2018 6:30:18 PM     TROPONIN I    Collection Time: 05/19/18  4:39 PM   Result Value Ref Range    Troponin-I, Qt. 24.50 (H) <0.05 ng/mL   GLUCOSE, POC    Collection Time: 05/19/18  7:18 PM   Result Value Ref Range    Glucose (POC) 201 (H) 65 - 100 mg/dL    Performed by URIEL WU*    GLUCOSE, POC    Collection Time: 05/19/18 10:17 PM   Result Value Ref Range    Glucose (POC) 218 (H) 65 - 100 mg/dL    Performed by Dean Kelly    CBC WITH AUTOMATED DIFF    Collection Time: 05/20/18  3:59 AM   Result Value Ref Range    WBC 6.2 3.6 - 11.0 K/uL    RBC 3.39 (L) 3.80 - 5.20 M/uL    HGB 9.3 (L) 11.5 - 16.0 g/dL    HCT 29.9 (L) 35.0 - 47.0 %    MCV 88.2 80.0 - 99.0 FL    MCH 27.4 26.0 - 34.0 PG    MCHC 31.1 30.0 - 36.5 g/dL    RDW 19.8 (H) 11.5 - 14.5 %    PLATELET 825 194 - 386 K/uL    MPV 9.5 8.9 - 12.9 FL    NRBC 0.0 0  WBC    ABSOLUTE NRBC 0.00 0.00 - 0.01 K/uL    NEUTROPHILS 58 32 - 75 %    LYMPHOCYTES 24 12 - 49 %    MONOCYTES 13 5 - 13 %    EOSINOPHILS 4 0 - 7 %    BASOPHILS 1 0 - 1 %    IMMATURE GRANULOCYTES 0 0.0 - 0.5 %    ABS. NEUTROPHILS 3.5 1.8 - 8.0 K/UL    ABS. LYMPHOCYTES 1.5 0.8 - 3.5 K/UL    ABS. MONOCYTES 0.8 0.0 - 1.0 K/UL    ABS. EOSINOPHILS 0.3 0.0 - 0.4 K/UL    ABS. BASOPHILS 0.0 0.0 - 0.1 K/UL    ABS. IMM. GRANS. 0.0 0.00 - 0.04 K/UL    DF AUTOMATED     METABOLIC PANEL, BASIC    Collection Time: 05/20/18  3:59 AM   Result Value Ref Range    Sodium 135 (L) 136 - 145 mmol/L    Potassium 4.1 3.5 - 5.1 mmol/L    Chloride 102 97 - 108 mmol/L    CO2 26 21 - 32 mmol/L    Anion gap 7 5 - 15 mmol/L    Glucose 156 (H) 65 - 100 mg/dL    BUN 25 (H) 6 - 20 MG/DL    Creatinine 1.05 (H) 0.55 - 1.02 MG/DL    BUN/Creatinine ratio 24 (H) 12 - 20      GFR est AA >60 >60 ml/min/1.73m2    GFR est non-AA 50 (L) >60 ml/min/1.73m2    Calcium 8.1 (L) 8.5 - 10.1 MG/DL   MAGNESIUM    Collection Time: 05/20/18  3:59 AM   Result Value Ref Range    Magnesium 1.6 1.6 - 2.4 mg/dL   TSH 3RD GENERATION    Collection Time: 05/20/18  3:59 AM   Result Value Ref Range    TSH 5.75 (H) 0.36 - 3.74 uIU/mL   GLUCOSE, POC    Collection Time: 05/20/18  6:33 AM   Result Value Ref Range    Glucose (POC) 123 (H) 65 - 100 mg/dL    Performed by David Faith    GLUCOSE, POC    Collection Time: 05/20/18 11:31 AM   Result Value Ref Range    Glucose (POC) 199 (H) 65 - 100 mg/dL    Performed by Brendon ePters        Telemetry: normal sinus rhythm      Assessment:     Active Problems:    Acute on chronic systolic HF (heart failure) ([facility]) (5/19/2018)    Non-ST elevation myocardial infarction  Anemia  Mitral valve regurgitation  History of stroke  Diabetes mellitus  Metastatic breast cancer  DNR  Plan:      The patient is comfortable and clinically stable. I will continue with the above medications including Lasix Lovenox aspirin Coreg lisinopril Aldactone. She is not on statin because of the drug intolerance. I will keep her n.p.o. after midnight so that Dr. Karine Fisher can have discussion with her and her son about a cardiac catheterization. I will hold the a.m. dose of Lovenox.   I recommend to hold off thoracentesis or placement of the pleural space pigtail for pleural effusion. We will continue with diuretic. She has lost about 8 pounds and there is a net negative urine output of 500 cc  Alanna Krueger M.D.  [facility]  Electrophysiology/Cardiology  [facility]  [address]  [phone]

## 2018-05-20 NOTE — ROUTINE PROCESS
Bedside shift change report given to Travon Bullard RN (oncoming nurse) by Neri Cordero RN (offgoing nurse). Report included the following information SBAR and Recent Results.

## 2018-05-20 NOTE — PROGRESS NOTES
Problem: Pressure Injury - Risk of  Goal: *Prevention of pressure injury  Document Calderon Scale and appropriate interventions in the flowsheet. Outcome: Progressing Towards Goal  Pressure Injury Interventions:  Sensory Interventions: Float heels, Keep linens dry and wrinkle-free, Minimize linen layers, Pad between skin to skin, Turn and reposition approx.  every two hours (pillows and wedges if needed), Assess changes in LOC, Check visual cues for pain, Discuss PT/OT consult with provider         Activity Interventions: Increase time out of bed    Mobility Interventions: HOB 30 degrees or less    Nutrition Interventions: Document food/fluid/supplement intake    Friction and Shear Interventions: Lift sheet, HOB 30 degrees or less, Foam dressings/transparent film/skin sealants, Apply protective barrier, creams and emollients, Minimize layers

## 2018-05-20 NOTE — PROGRESS NOTES
Mrs. Donell Dodson was complaining of left breast pain. Acetamenofen was the only medication ordered, medication given to patient with \"samantha\" her son in the room. 1 hour later patient stated she is still in pain. She had not had pain in the past.  Patient, son and I discussed what medications she was given previously for her cancer pain. I listed different options, she answered it was a codone something. Dr. Raymundo Mercado notified and new orders for Norco and fentanyl were obtained and an EKG was ordered to confirm no changes since admission. EKG done. Nurse returned to the room with Naga Sims the son with medical power of  was present and ordered he did not want his mother to receive any pain management with out his concent at all, he is the POA. Nurse flori pointed out that his mother is of sound mind. The son was very upset that his mother doesn't understand the difference between a narcotic and baby aspirin. I informed him that if the patient asked for pain management and he wasn't there the patient had the right to medication. Robin wanted to speak with Dr. Thony Rowan because he ordered the pain medication. I explained he ordered the narcotic because I phoned him as Acetamenophen was not helping the pain.   I advised he could write a note on the board asking future nurses not to administered

## 2018-05-21 LAB
ANION GAP SERPL CALC-SCNC: 10 MMOL/L (ref 5–15)
ATRIAL RATE: 86 BPM
BACTERIA SPEC CULT: ABNORMAL
BUN SERPL-MCNC: 26 MG/DL (ref 6–20)
BUN/CREAT SERPL: 25 (ref 12–20)
CALCIUM SERPL-MCNC: 8.9 MG/DL (ref 8.5–10.1)
CALCULATED P AXIS, ECG09: 53 DEGREES
CALCULATED R AXIS, ECG10: 39 DEGREES
CALCULATED T AXIS, ECG11: 127 DEGREES
CC UR VC: ABNORMAL
CHLORIDE SERPL-SCNC: 101 MMOL/L (ref 97–108)
CO2 SERPL-SCNC: 25 MMOL/L (ref 21–32)
CREAT SERPL-MCNC: 1.06 MG/DL (ref 0.55–1.02)
DIAGNOSIS, 93000: NORMAL
ERYTHROCYTE [DISTWIDTH] IN BLOOD BY AUTOMATED COUNT: 19.6 % (ref 11.5–14.5)
GLUCOSE BLD STRIP.AUTO-MCNC: 160 MG/DL (ref 65–100)
GLUCOSE BLD STRIP.AUTO-MCNC: 222 MG/DL (ref 65–100)
GLUCOSE BLD STRIP.AUTO-MCNC: 93 MG/DL (ref 65–100)
GLUCOSE SERPL-MCNC: 97 MG/DL (ref 65–100)
HCT VFR BLD AUTO: 30.5 % (ref 35–47)
HGB BLD-MCNC: 9.4 G/DL (ref 11.5–16)
MCH RBC QN AUTO: 27 PG (ref 26–34)
MCHC RBC AUTO-ENTMCNC: 30.8 G/DL (ref 30–36.5)
MCV RBC AUTO: 87.6 FL (ref 80–99)
NRBC # BLD: 0 K/UL (ref 0–0.01)
NRBC BLD-RTO: 0 PER 100 WBC
P-R INTERVAL, ECG05: 136 MS
PLATELET # BLD AUTO: 296 K/UL (ref 150–400)
PMV BLD AUTO: 10.4 FL (ref 8.9–12.9)
POTASSIUM SERPL-SCNC: 4.5 MMOL/L (ref 3.5–5.1)
Q-T INTERVAL, ECG07: 372 MS
QRS DURATION, ECG06: 82 MS
QTC CALCULATION (BEZET), ECG08: 445 MS
RBC # BLD AUTO: 3.48 M/UL (ref 3.8–5.2)
SERVICE CMNT-IMP: ABNORMAL
SERVICE CMNT-IMP: NORMAL
SODIUM SERPL-SCNC: 136 MMOL/L (ref 136–145)
T3FREE SERPL-MCNC: 1.7 PG/ML (ref 2.2–4)
T4 SERPL-MCNC: 6.8 UG/DL (ref 4.8–13.9)
TROPONIN I SERPL-MCNC: 13.9 NG/ML
VENTRICULAR RATE, ECG03: 86 BPM
WBC # BLD AUTO: 6.1 K/UL (ref 3.6–11)

## 2018-05-21 PROCEDURE — 74011000250 HC RX REV CODE- 250: Performed by: SPECIALIST

## 2018-05-21 PROCEDURE — 74011250636 HC RX REV CODE- 250/636: Performed by: HOSPITALIST

## 2018-05-21 PROCEDURE — C1760 CLOSURE DEV, VASC: HCPCS

## 2018-05-21 PROCEDURE — 84436 ASSAY OF TOTAL THYROXINE: CPT | Performed by: HOSPITALIST

## 2018-05-21 PROCEDURE — 74011250636 HC RX REV CODE- 250/636: Performed by: SPECIALIST

## 2018-05-21 PROCEDURE — 74011636637 HC RX REV CODE- 636/637: Performed by: HOSPITALIST

## 2018-05-21 PROCEDURE — 77030013744

## 2018-05-21 PROCEDURE — 77010033678 HC OXYGEN DAILY

## 2018-05-21 PROCEDURE — 74011636320 HC RX REV CODE- 636/320: Performed by: SPECIALIST

## 2018-05-21 PROCEDURE — 65660000000 HC RM CCU STEPDOWN

## 2018-05-21 PROCEDURE — 80048 BASIC METABOLIC PNL TOTAL CA: CPT | Performed by: HOSPITALIST

## 2018-05-21 PROCEDURE — 85027 COMPLETE CBC AUTOMATED: CPT | Performed by: HOSPITALIST

## 2018-05-21 PROCEDURE — 82962 GLUCOSE BLOOD TEST: CPT

## 2018-05-21 PROCEDURE — C1894 INTRO/SHEATH, NON-LASER: HCPCS

## 2018-05-21 PROCEDURE — 4A023N7 MEASUREMENT OF CARDIAC SAMPLING AND PRESSURE, LEFT HEART, PERCUTANEOUS APPROACH: ICD-10-PCS | Performed by: SPECIALIST

## 2018-05-21 PROCEDURE — B2111ZZ FLUOROSCOPY OF MULTIPLE CORONARY ARTERIES USING LOW OSMOLAR CONTRAST: ICD-10-PCS | Performed by: SPECIALIST

## 2018-05-21 PROCEDURE — 36415 COLL VENOUS BLD VENIPUNCTURE: CPT | Performed by: HOSPITALIST

## 2018-05-21 PROCEDURE — 99153 MOD SED SAME PHYS/QHP EA: CPT

## 2018-05-21 PROCEDURE — B2151ZZ FLUOROSCOPY OF LEFT HEART USING LOW OSMOLAR CONTRAST: ICD-10-PCS | Performed by: SPECIALIST

## 2018-05-21 PROCEDURE — 74011250637 HC RX REV CODE- 250/637: Performed by: HOSPITALIST

## 2018-05-21 PROCEDURE — 77030004533 HC CATH ANGI DX IMP BSC -B

## 2018-05-21 PROCEDURE — 84484 ASSAY OF TROPONIN QUANT: CPT | Performed by: NURSE PRACTITIONER

## 2018-05-21 RX ORDER — SODIUM CHLORIDE 900 MG/100ML
INJECTION INTRAVENOUS
Status: DISCONTINUED
Start: 2018-05-21 | End: 2018-05-25 | Stop reason: HOSPADM

## 2018-05-21 RX ORDER — CLOPIDOGREL BISULFATE 75 MG/1
75 TABLET ORAL DAILY
Status: DISCONTINUED | OUTPATIENT
Start: 2018-05-21 | End: 2018-05-25 | Stop reason: HOSPADM

## 2018-05-21 RX ORDER — HEPARIN SODIUM 200 [USP'U]/100ML
1000 INJECTION, SOLUTION INTRAVENOUS
Status: DISCONTINUED | OUTPATIENT
Start: 2018-05-21 | End: 2018-05-21 | Stop reason: ALTCHOICE

## 2018-05-21 RX ORDER — ATROPINE SULFATE 0.1 MG/ML
1 INJECTION INTRAVENOUS AS NEEDED
Status: DISCONTINUED | OUTPATIENT
Start: 2018-05-21 | End: 2018-05-21 | Stop reason: ALTCHOICE

## 2018-05-21 RX ORDER — SODIUM CHLORIDE 9 MG/ML
3 INJECTION, SOLUTION INTRAVENOUS CONTINUOUS
Status: DISPENSED | OUTPATIENT
Start: 2018-05-21 | End: 2018-05-21

## 2018-05-21 RX ORDER — HEPARIN SODIUM 1000 [USP'U]/ML
5000 INJECTION, SOLUTION INTRAVENOUS; SUBCUTANEOUS
Status: DISCONTINUED | OUTPATIENT
Start: 2018-05-21 | End: 2018-05-21 | Stop reason: ALTCHOICE

## 2018-05-21 RX ORDER — SODIUM CHLORIDE 9 MG/ML
1.5 INJECTION, SOLUTION INTRAVENOUS CONTINUOUS
Status: DISPENSED | OUTPATIENT
Start: 2018-05-21 | End: 2018-05-21

## 2018-05-21 RX ORDER — LIDOCAINE HYDROCHLORIDE 10 MG/ML
4-30 INJECTION INFILTRATION; PERINEURAL
Status: DISCONTINUED | OUTPATIENT
Start: 2018-05-21 | End: 2018-05-21 | Stop reason: ALTCHOICE

## 2018-05-21 RX ORDER — SODIUM CHLORIDE 0.9 % (FLUSH) 0.9 %
5-10 SYRINGE (ML) INJECTION AS NEEDED
Status: DISCONTINUED | OUTPATIENT
Start: 2018-05-21 | End: 2018-05-25 | Stop reason: HOSPADM

## 2018-05-21 RX ORDER — SODIUM CHLORIDE 0.9 % (FLUSH) 0.9 %
5-10 SYRINGE (ML) INJECTION EVERY 8 HOURS
Status: DISCONTINUED | OUTPATIENT
Start: 2018-05-21 | End: 2018-05-25 | Stop reason: HOSPADM

## 2018-05-21 RX ORDER — CLOPIDOGREL 300 MG/1
600 TABLET, FILM COATED ORAL
Status: DISCONTINUED | OUTPATIENT
Start: 2018-05-21 | End: 2018-05-21 | Stop reason: ALTCHOICE

## 2018-05-21 RX ORDER — MIDAZOLAM HYDROCHLORIDE 1 MG/ML
1-10 INJECTION, SOLUTION INTRAMUSCULAR; INTRAVENOUS
Status: DISCONTINUED | OUTPATIENT
Start: 2018-05-21 | End: 2018-05-21 | Stop reason: ALTCHOICE

## 2018-05-21 RX ORDER — HEPARIN SODIUM 1000 [USP'U]/ML
INJECTION, SOLUTION INTRAVENOUS; SUBCUTANEOUS
Status: DISCONTINUED
Start: 2018-05-21 | End: 2018-05-25 | Stop reason: HOSPADM

## 2018-05-21 RX ORDER — FENTANYL CITRATE 50 UG/ML
25-200 INJECTION, SOLUTION INTRAMUSCULAR; INTRAVENOUS
Status: DISCONTINUED | OUTPATIENT
Start: 2018-05-21 | End: 2018-05-21 | Stop reason: ALTCHOICE

## 2018-05-21 RX ORDER — SODIUM CHLORIDE 0.9 % (FLUSH) 0.9 %
10 SYRINGE (ML) INJECTION AS NEEDED
Status: DISCONTINUED | OUTPATIENT
Start: 2018-05-21 | End: 2018-05-21 | Stop reason: ALTCHOICE

## 2018-05-21 RX ORDER — BIVALIRUDIN 250 MG/5ML
INJECTION, POWDER, LYOPHILIZED, FOR SOLUTION INTRAVENOUS
Status: DISCONTINUED
Start: 2018-05-21 | End: 2018-05-21 | Stop reason: ALTCHOICE

## 2018-05-21 RX ADMIN — FENTANYL CITRATE 25 MCG: 50 INJECTION, SOLUTION INTRAMUSCULAR; INTRAVENOUS at 10:51

## 2018-05-21 RX ADMIN — Medication 2000 UNITS: at 10:51

## 2018-05-21 RX ADMIN — INSULIN LISPRO 2 UNITS: 100 INJECTION, SOLUTION INTRAVENOUS; SUBCUTANEOUS at 17:55

## 2018-05-21 RX ADMIN — DOCUSATE SODIUM 100 MG: 100 CAPSULE, LIQUID FILLED ORAL at 08:23

## 2018-05-21 RX ADMIN — SODIUM CHLORIDE 3 ML/KG/HR: 900 INJECTION, SOLUTION INTRAVENOUS at 10:00

## 2018-05-21 RX ADMIN — FOLIC ACID 1 MG: 1 TABLET ORAL at 08:24

## 2018-05-21 RX ADMIN — DOCUSATE SODIUM 100 MG: 100 CAPSULE, LIQUID FILLED ORAL at 17:55

## 2018-05-21 RX ADMIN — SPIRONOLACTONE 25 MG: 25 TABLET, FILM COATED ORAL at 08:25

## 2018-05-21 RX ADMIN — Medication 1 CAPSULE: at 08:25

## 2018-05-21 RX ADMIN — ANASTROZOLE 1 MG: 1 TABLET, COATED ORAL at 08:35

## 2018-05-21 RX ADMIN — LISINOPRIL 2.5 MG: 5 TABLET ORAL at 08:26

## 2018-05-21 RX ADMIN — FENTANYL CITRATE 25 MCG: 50 INJECTION, SOLUTION INTRAMUSCULAR; INTRAVENOUS at 10:35

## 2018-05-21 RX ADMIN — ENOXAPARIN SODIUM 50 MG: 60 INJECTION SUBCUTANEOUS at 17:56

## 2018-05-21 RX ADMIN — INSULIN LISPRO 2 UNITS: 100 INJECTION, SOLUTION INTRAVENOUS; SUBCUTANEOUS at 21:23

## 2018-05-21 RX ADMIN — Medication 10 ML: at 21:23

## 2018-05-21 RX ADMIN — LIDOCAINE HYDROCHLORIDE 10 ML: 10 INJECTION, SOLUTION INFILTRATION; PERINEURAL at 10:55

## 2018-05-21 RX ADMIN — Medication 10 ML: at 10:35

## 2018-05-21 RX ADMIN — ASPIRIN 81 MG: 81 TABLET, COATED ORAL at 08:25

## 2018-05-21 RX ADMIN — FUROSEMIDE 40 MG: 10 INJECTION, SOLUTION INTRAMUSCULAR; INTRAVENOUS at 08:36

## 2018-05-21 RX ADMIN — MIDAZOLAM HYDROCHLORIDE 1 MG: 1 INJECTION, SOLUTION INTRAMUSCULAR; INTRAVENOUS at 10:35

## 2018-05-21 RX ADMIN — CARVEDILOL 6.25 MG: 6.25 TABLET, FILM COATED ORAL at 17:55

## 2018-05-21 RX ADMIN — CARVEDILOL 6.25 MG: 6.25 TABLET, FILM COATED ORAL at 08:23

## 2018-05-21 RX ADMIN — IOPAMIDOL 106 ML: 755 INJECTION, SOLUTION INTRAVENOUS at 11:12

## 2018-05-21 RX ADMIN — SODIUM CHLORIDE 1.5 ML/KG/HR: 900 INJECTION, SOLUTION INTRAVENOUS at 11:19

## 2018-05-21 RX ADMIN — LEVOTHYROXINE SODIUM 88 MCG: 88 TABLET ORAL at 06:49

## 2018-05-21 NOTE — NURSE NAVIGATOR
Chart reviewed by Heart Failure Nurse Navigator. Heart Failure database completed. EF:  45%    ACEi/ARB: lisinopril 2.5 mg daily    BB: coreg 6.25 mg twice daily    Aldosterone Antagonist: spironolactone 25 mg daily    CRT: not currently indicated    NYHA Functional Class documentation requested via Provider Message on [address] [facility] Failure Teach Back in Patient Education. Heart Failure Avoiding Triggers on Discharge Instructions. Cardiologist: Dr. Chris Zuleta ([facility])    READMISSION: Prior admission 4/16-5/11/18, primary diagnosis sepsis. Patient was discharged to [facility] rehab. JUDE Banks NN ([facility]) notified of readmission.

## 2018-05-21 NOTE — PROGRESS NOTES
Cardiac Cath Lab Procedure Area Arrival Note:    Arthur Hdez arrived to Cardiac Cath Lab, Procedure Area. Patient identifiers verified with NAME and DATE OF BIRTH. Procedure verified with patient. Consent forms verified. Allergies verified. Patient informed of procedure and plan of care. Questions answered with review. Patient voiced understanding of procedure and plan of care. Patient on cardiac monitor, non-invasive blood pressure, SPO2 monitor. On O2 @ 2 lpm via NC.  IV of NS on pump at 157 ml/hr. Patient status doing well without problems. Patient is A&Ox 4. Patient reports no pain. Patient medicated during procedure with orders obtained and verified by Dr. Joy Vasquez. Refer to patients Cardiac Cath Lab PROCEDURE REPORT for vital signs, assessment, status, and response during procedure, printed at end of case. Printed report on chart or scanned into chart. TRANSFER - OUT REPORT:    Verbal report given to [facility] on Arthur Hdez being transferred to cath lab recovery for routine progression of care       Report consisted of patients Situation, Background, Assessment and   Recommendations(SBAR). Information from the following report(s) Procedure Summary was reviewed with the receiving nurse. Opportunity for questions and clarification was provided.

## 2018-05-21 NOTE — DIABETES MGMT
DTC Progress Note    Recommendations/ Comments: Chart reviewed due to hyperglycemia. If appropriate, please consider adding carb consistent to diet order. Current hospital DM medication: Amaryl 1 mg, Januvia 50 mg and correction scale Humalog, normal sensitivity. Chart reviewed on Sandy Ledesma. Patient is a 82 y.o. female with known diabetes on Amaryl 1 mg and Januvia 25 mg at home. A1c:   Lab Results   Component Value Date/Time    Hemoglobin A1c 7.0 (H) 04/18/2018 08:14 AM    Hemoglobin A1c 6.9 (H) 11/30/2011 08:30 AM       Recent Glucose Results: Lab Results   Component Value Date/Time    GLU 97 05/21/2018 04:42 AM    GLUCPOC 93 05/21/2018 06:33 AM    GLUCPOC 198 (H) 05/20/2018 09:55 PM    GLUCPOC 191 (H) 05/20/2018 04:14 PM        Lab Results   Component Value Date/Time    Creatinine 1.06 (H) 05/21/2018 04:42 AM     Estimated Creatinine Clearance: 31.4 mL/min (based on Cr of 1.06). Active Orders   Diet    DIET CARDIAC Regular        PO intake: Patient Vitals for the past 72 hrs:   % Diet Eaten   05/20/18 1201 100 %   05/19/18 2002 75 %       Will continue to follow as needed.     Thank you  Derek Enrique RD, CDE

## 2018-05-21 NOTE — PROGRESS NOTES
Sharon Coelho MD   Phone/text: [phone] (7am to 7pm)  After 7pm please call  for hospitalist on call    Hospitalist Progress Note     5/21/2018   PCP:  Dr. Freddy Salguero MD  Chief Complaint   Patient presents with    Shortness of Breath       Admission Summary:   The patient is an 72-year-old  female with multiple past medical history, including diabetes mellitus, rheumatoid arthritis, metastatic left breast cancer, bilateral pleural effusions, status post thoracocentesis, non-ST elevation myocardial infarction, anemia, hypothyroidism and gastroesophageal reflux disease. She was recently admitted here at [facility], and she was in the hospital for a prolonged period of time, from 04/16 and was discharged on 05/11, for NSTEMI and bilateral pleural effusion. The patient was discharged to [facility]. Early this morning, she was again brought from [facility] to the emergency department, because she was found to be short of breath and agitated. Reason For Visit:  NSTEMI, heart failure    Assessment/Plan   Acute on chronic systolic heart failure (POA) - NHYA4 on presentation, breathing much better  - CXR 5/19 with bilateral pleural effusion L>R, diffuse interstitial edema  - Echo 4/26 with mild hypokinesis of the basal-mid anteroseptal and apical septal walls, moderate MR, small pericardial effusion  - Continue lasix IV for diuresis  - Continue carvedilol, lisinopril, spironolactone  - Cardiology consulted    NSTEMI (POA) - plan for cath today  - ECG 5/19 with sinus rhythm, old q waves in the anterior leads, old t-wave inversions in the lateral leads.  ST depression no longer present in the inferior leads  - Troponin markedly elevated  - Continue aspirin, carvedilol  - Cardiology consulted    Bilateral pleural effusions (POA) - suspect malignant but cytology from thoracentesis 5/5 negative, looks stable compared to 2 weeks ago  - CT chest 5/19 with bilateral pleural effusions, pericardial effusion, skeletal mets, left breast mass, left staghorn calculus in the left kidney    Hypoxia (POA) - due to heart failure, pleural effusions, NSTEMI. Improving  - Supplemental O2    Metastatic left breast cancer ER/[place] +, Her-2 - (chronic)  - Continue anastrozole    DM2 with hyperglycemia - recent A1c 7, stable  - Continue januvia, amaryl  - SSI as needed    Hypothyroidism (chronic)  - TSH mildly elevated. Suspect sick euthyroid, will check T3, T4  - Continue synthroid    Anemia (chronic) - stable    RA (chronic) - receives outpatient etanercept    HLD (chronic) - statin allergy, receives outpatient evolocumab    See orders for other plans. VTE prophylaxis: enoxaparin  Discussed plan of care with Patient/Family and Nurse   Pre-admission lived at home but was at [facility]  Discharge plan: back to [facility]  Estimated time to discharge: unclear     Subjective   Ms. Ledesma is feeling fine this morning. She is going to have her cath later today. She denies chest pain, SOB, palpitations. Reviewed interval history  Physical examination     Visit Vitals    /69 (BP 1 Location: Left arm)    Pulse 86    Temp 98.4 °F (36.9 °C)    Resp 19    Wt 52.4 kg (115 lb 8.3 oz)    SpO2 99%    Breastfeeding No    BMI 21.83 kg/m2       Temp (24hrs), Av.2 °F (36.8 °C), Min:97.9 °F (36.6 °C), Max:98.5 °F (36.9 °C)      Intake/Output Summary (Last 24 hours) at 18 0847  Last data filed at 18 0651   Gross per 24 hour   Intake              480 ml   Output             1650 ml   Net            -1170 ml       Gen - NAD  HEENT - MMM  Neck - supple, full ROM  CV - RRR, +s4, 2/6 systolic murmur  Resp - lungs decreased at bases with crackles, no wheezing, normal WOB  GI - abdomen S, NT, ND, +BS.  No hepatosplenomegaly   - no CVA tenderness, bladder non-palpable in lower abdomen  MSK - normal tone and bulk, no edema  Neuro - A&O, no focal deficits  Psych - calm and cooperative with normal affect    Data Review       Telemetry x   ECG    Xray    CT scan    MRI    Echocardiogram    Ultrasound              I have reviewed the flow sheet and recent notes  New labs and data personally reviewed.     Recent Labs      05/21/18 0442 05/20/18   0359  05/19/18   0230   WBC  6.1  6.2  8.1   HGB  9.4*  9.3*  10.7*   HCT  30.5*  29.9*  34.7*   PLT  296  266  343     Recent Labs      05/21/18 0442 05/20/18   0359  05/19/18   0310   NA  136  135*  132*   K  4.5  4.1  4.4   CL  101  102  99   CO2  25  26  26   GLU  97  156*  336*   BUN  26*  25*  20   CREA  1.06*  1.05*  0.97   CA  8.9  8.1*  8.8   MG   --   1.6  1.8   PHOS   --    --   4.1   ALB   --    --   2.2*   TBILI   --    --   0.4   SGOT   --    --   25   ALT   --    --   19   INR   --    --   1.1       Medications reviewed  Current Facility-Administered Medications   Medication Dose Route Frequency    enoxaparin (LOVENOX) injection 50 mg  50 mg SubCUTAneous Q12H    HYDROcodone-acetaminophen (NORCO) 5-325 mg per tablet 1 Tab  1 Tab Oral Q4H PRN    fentaNYL citrate (PF) injection 25 mcg  25 mcg IntraVENous Q4H PRN    acetaminophen (TYLENOL) tablet 650 mg  650 mg Oral Q4H PRN    glucose chewable tablet 16 g  4 Tab Oral PRN    dextrose (D50W) injection syrg 12.5-25 g  12.5-25 g IntraVENous PRN    glucagon (GLUCAGEN) injection 1 mg  1 mg IntraMUSCular PRN    insulin lispro (HUMALOG) injection   SubCUTAneous AC&HS    anastrozole (ARIMIDEX) tablet 1 mg  1 mg Oral DAILY    carvedilol (COREG) tablet 6.25 mg  6.25 mg Oral BID WITH MEALS    docusate sodium (COLACE) capsule 100 mg  100 mg Oral BID    levothyroxine (SYNTHROID) tablet 88 mcg  88 mcg Oral ACB    lactobac ac& pc-s.therm-b.anim (BERYL Q/RISAQUAD)  1 Cap Oral DAILY    glimepiride (AMARYL) tablet 1 mg  1 mg Oral 7am    lisinopril (PRINIVIL, ZESTRIL) tablet 2.5 mg  2.5 mg Oral DAILY    polyethylene glycol (MIRALAX) packet 17 g  17 g Oral DAILY    spironolactone (ALDACTONE) tablet 25 mg  25 mg Oral DAILY    folic acid (FOLVITE) tablet 1 mg  1 mg Oral DAILY    SITagliptin (JANUVIA) tablet tab 50 mg  50 mg Oral DAILY    aspirin delayed-release tablet 81 mg  81 mg Oral DAILY    furosemide (LASIX) injection 40 mg  40 mg IntraVENous DAILY         Amanda Mustafa MD  Internal Medicine  5/21/2018

## 2018-05-21 NOTE — PROGRESS NOTES
TRANSFER - OUT REPORT:    Verbal report given to Josesito LANE(name) on Trudy Haywood  being transferred to [facility]) for routine progression of care       Report consisted of patients Situation, Background, Assessment and   Recommendations(SBAR). Information from the following report(s) Procedure Summary, Intake/Output, MAR, Recent Results, Med Rec Status and Cardiac Rhythm SR was reviewed with the receiving nurse. Lines:   Peripheral IV 05/19/18 Right Antecubital (Active)   Site Assessment Clean, dry, & intact 5/21/2018  9:45 AM   Phlebitis Assessment 0 5/21/2018  9:45 AM   Infiltration Assessment 0 5/21/2018  9:45 AM   Dressing Status Clean, dry, & intact 5/21/2018  9:45 AM   Dressing Type Transparent 5/21/2018  9:45 AM   Hub Color/Line Status Pink; Infusing;Flushed 5/21/2018  9:45 AM   Action Taken Open ports on tubing capped 5/20/2018  3:52 AM   Alcohol Cap Used Yes 5/21/2018  9:45 AM        Opportunity for questions and clarification was provided.       Patient transported with:   Monitor  Registered Nurse   1250 transferred to room 449 via bed, with nurse and tech

## 2018-05-21 NOTE — PROCEDURES
Cardiac Catheterization Procedure Note   Patient: Joesph Espana  MRN: [id number]  SSN: [id number]   YOB: 1935 Age: [address] y.o. Sex: female    Date of Procedure: 5/21/2018   Pre-procedure Diagnosis: Coronary Artery Disease and NSTEMI  Post-procedure Diagnosis: Coronary Artery Disease  Procedure: Left Heart Cath  :  Dr. Malena Briggs MD    Assistant(s):  None  Anesthesia: Moderate Sedation   Estimated Blood Loss: Less than 10 mL   Specimens Removed: None  Findings: Severe diffuse 3 vessel disease multiple lesions not a candidate for CABG will treat medically. Mynx to groin.   Complications: None   Implants:  None  Signed by:  Malena Briggs MD  5/21/2018  11:26 AM

## 2018-05-21 NOTE — ROUTINE PROCESS
Bedside and Verbal shift change report given to Celso Richards (oncoming nurse) by Anisha Weinstein (offgoing nurse). Report included the following information SBAR, Kardex, Procedure Summary, MAR, Med Rec Status and Cardiac Rhythm NSR.

## 2018-05-21 NOTE — PROGRESS NOTES
TRANSFER - IN REPORT:    Verbal report received from [facility] on [address]  being received from procedure for routine progression of care. Report consisted of patients Situation, Background, Assessment and Recommendations(SBAR). Information from the following report(s) Procedure Summary, Intake/Output, Recent Results and Med Rec Status was reviewed with the receiving clinician. Opportunity for questions and clarification was provided. Assessment completed upon patients arrival to [address] [place] [address] and care assumed. Cardiac Cath Lab Recovery Arrival Note:    Sandy Ledesma arrived to [facility] recovery area. Patient procedure= C. Patient on cardiac monitor, non-invasive blood pressure, SPO2 monitor. On O2 @ 2 lpm via n/c. IV  of nacl on pump at 78.6 ml/hr. Patient status doing well without problems. Patient is A&Ox 4, but sleepy. Patient reports no complaints. PROCEDURE SITE CHECK:    Procedure site:without any bleeding and or hematoma, no pain/discomfort reported at procedure site. No change in patient status. Continue to monitor patient and status.

## 2018-05-21 NOTE — WOUND CARE
Wound Consult:  New Patient Visit. Chart reviewed. Consulted for sacral ulcer POA. Spoke with patients nurse,  Josesito to hand off on visit and Dr. Uriah Mcclellan in to see patient at time as well. Patient is resting on a Versacare bed with accumax. No pain reported; she has personal care aid at bedside; turns with assist x1. Usually mobile at home. Assessment:  Sacrum - pink hypopigmented intact skin on each inner buttock through sacral area where skin is not completely healed; pink moist centrally with resurfaced pink intact skin in a 2 x 3 cm area total - bridges of intact skin within measurement as well. Stage 2 pressure injury POA. No discoloration to heels. Treatment:  Mepilex border in place to sacrum and kept in place. Resumed turn towards side. Heels floated. Wound Recommendations:  Wound Care: Mepilex Border to sacral ulcer and change every three days and prn if needed. Skin Care Recommendations:  1. Minimize friction/shear: minimize layers of linen/pads under patient. 2. Off load pressure/reposition: continue to turn and reposition approximately every 2 hours; float heels; waffle cushion for sitting. 3. Manage Moisture - keep skin folds dry; incontinence skin care as needed;  castro in use to contain urine. 4. Continue to monitor nutrition, pain, and skin risk scale, and skin assessment. Plan:  Spoke with Dr. Uriah Mcclellan regarding findings and obtained orders for treatment. We will continue to reassess routinely and as needed.   Richard Fisher, [address] [facility] Office [phone]  Pager [phone]

## 2018-05-21 NOTE — PROGRESS NOTES
TRANSFER - IN REPORT:    Verbal report received from [facility], RN(name) on [address]  being received from Procedural Room(unit) for routine progression of care      Report consisted of patients Situation, Background, Assessment and   Recommendations(SBAR). Information from the following report(s) Procedure Summary and MAR was reviewed with the receiving nurse. Opportunity for questions and clarification was provided. Assessment completed upon patients arrival to unit and care assumed.

## 2018-05-22 LAB
ANION GAP SERPL CALC-SCNC: 10 MMOL/L (ref 5–15)
BUN SERPL-MCNC: 24 MG/DL (ref 6–20)
BUN/CREAT SERPL: 23 (ref 12–20)
CALCIUM SERPL-MCNC: 8.5 MG/DL (ref 8.5–10.1)
CHLORIDE SERPL-SCNC: 102 MMOL/L (ref 97–108)
CO2 SERPL-SCNC: 25 MMOL/L (ref 21–32)
CREAT SERPL-MCNC: 1.04 MG/DL (ref 0.55–1.02)
ERYTHROCYTE [DISTWIDTH] IN BLOOD BY AUTOMATED COUNT: 19.6 % (ref 11.5–14.5)
GLUCOSE BLD STRIP.AUTO-MCNC: 159 MG/DL (ref 65–100)
GLUCOSE BLD STRIP.AUTO-MCNC: 205 MG/DL (ref 65–100)
GLUCOSE BLD STRIP.AUTO-MCNC: 253 MG/DL (ref 65–100)
GLUCOSE BLD STRIP.AUTO-MCNC: 295 MG/DL (ref 65–100)
GLUCOSE SERPL-MCNC: 163 MG/DL (ref 65–100)
HCT VFR BLD AUTO: 30.9 % (ref 35–47)
HGB BLD-MCNC: 9.6 G/DL (ref 11.5–16)
MCH RBC QN AUTO: 27 PG (ref 26–34)
MCHC RBC AUTO-ENTMCNC: 31.1 G/DL (ref 30–36.5)
MCV RBC AUTO: 86.8 FL (ref 80–99)
NRBC # BLD: 0 K/UL (ref 0–0.01)
NRBC BLD-RTO: 0 PER 100 WBC
PLATELET # BLD AUTO: 296 K/UL (ref 150–400)
PMV BLD AUTO: 10 FL (ref 8.9–12.9)
POTASSIUM SERPL-SCNC: 4.1 MMOL/L (ref 3.5–5.1)
RBC # BLD AUTO: 3.56 M/UL (ref 3.8–5.2)
SERVICE CMNT-IMP: ABNORMAL
SODIUM SERPL-SCNC: 137 MMOL/L (ref 136–145)
WBC # BLD AUTO: 5.8 K/UL (ref 3.6–11)

## 2018-05-22 PROCEDURE — G8978 MOBILITY CURRENT STATUS: HCPCS

## 2018-05-22 PROCEDURE — 97530 THERAPEUTIC ACTIVITIES: CPT

## 2018-05-22 PROCEDURE — 97535 SELF CARE MNGMENT TRAINING: CPT

## 2018-05-22 PROCEDURE — 82962 GLUCOSE BLOOD TEST: CPT

## 2018-05-22 PROCEDURE — 85027 COMPLETE CBC AUTOMATED: CPT | Performed by: HOSPITALIST

## 2018-05-22 PROCEDURE — 74011250636 HC RX REV CODE- 250/636: Performed by: HOSPITALIST

## 2018-05-22 PROCEDURE — 65660000000 HC RM CCU STEPDOWN

## 2018-05-22 PROCEDURE — 97161 PT EVAL LOW COMPLEX 20 MIN: CPT

## 2018-05-22 PROCEDURE — 36415 COLL VENOUS BLD VENIPUNCTURE: CPT | Performed by: HOSPITALIST

## 2018-05-22 PROCEDURE — 80048 BASIC METABOLIC PNL TOTAL CA: CPT | Performed by: HOSPITALIST

## 2018-05-22 PROCEDURE — G8979 MOBILITY GOAL STATUS: HCPCS

## 2018-05-22 PROCEDURE — 97116 GAIT TRAINING THERAPY: CPT

## 2018-05-22 PROCEDURE — 74011636637 HC RX REV CODE- 636/637: Performed by: HOSPITALIST

## 2018-05-22 PROCEDURE — 74011250637 HC RX REV CODE- 250/637: Performed by: SPECIALIST

## 2018-05-22 PROCEDURE — 74011250637 HC RX REV CODE- 250/637: Performed by: HOSPITALIST

## 2018-05-22 PROCEDURE — 97165 OT EVAL LOW COMPLEX 30 MIN: CPT

## 2018-05-22 RX ORDER — FUROSEMIDE 20 MG/1
20 TABLET ORAL DAILY
Status: DISCONTINUED | OUTPATIENT
Start: 2018-05-23 | End: 2018-05-25 | Stop reason: HOSPADM

## 2018-05-22 RX ORDER — B-COMPLEX WITH VITAMIN C
1 TABLET ORAL DAILY
Status: DISCONTINUED | OUTPATIENT
Start: 2018-05-22 | End: 2018-05-25 | Stop reason: HOSPADM

## 2018-05-22 RX ADMIN — FUROSEMIDE 40 MG: 10 INJECTION, SOLUTION INTRAMUSCULAR; INTRAVENOUS at 10:12

## 2018-05-22 RX ADMIN — CLOPIDOGREL BISULFATE 75 MG: 75 TABLET ORAL at 10:12

## 2018-05-22 RX ADMIN — INSULIN LISPRO 3 UNITS: 100 INJECTION, SOLUTION INTRAVENOUS; SUBCUTANEOUS at 17:51

## 2018-05-22 RX ADMIN — INSULIN LISPRO 2 UNITS: 100 INJECTION, SOLUTION INTRAVENOUS; SUBCUTANEOUS at 07:17

## 2018-05-22 RX ADMIN — LISINOPRIL 2.5 MG: 5 TABLET ORAL at 10:13

## 2018-05-22 RX ADMIN — FOLIC ACID 1 MG: 1 TABLET ORAL at 10:13

## 2018-05-22 RX ADMIN — ENOXAPARIN SODIUM 50 MG: 60 INJECTION SUBCUTANEOUS at 04:51

## 2018-05-22 RX ADMIN — CARVEDILOL 6.25 MG: 6.25 TABLET, FILM COATED ORAL at 17:50

## 2018-05-22 RX ADMIN — SITAGLIPTIN 50 MG: 25 TABLET, FILM COATED ORAL at 10:12

## 2018-05-22 RX ADMIN — Medication 10 ML: at 22:36

## 2018-05-22 RX ADMIN — ANASTROZOLE 1 MG: 1 TABLET, COATED ORAL at 10:24

## 2018-05-22 RX ADMIN — CARVEDILOL 6.25 MG: 6.25 TABLET, FILM COATED ORAL at 10:13

## 2018-05-22 RX ADMIN — ASPIRIN 81 MG: 81 TABLET, COATED ORAL at 10:12

## 2018-05-22 RX ADMIN — GLIMEPIRIDE 1 MG: 1 TABLET ORAL at 07:17

## 2018-05-22 RX ADMIN — INSULIN LISPRO 3 UNITS: 100 INJECTION, SOLUTION INTRAVENOUS; SUBCUTANEOUS at 22:36

## 2018-05-22 RX ADMIN — Medication 10 ML: at 13:04

## 2018-05-22 RX ADMIN — INSULIN LISPRO 5 UNITS: 100 INJECTION, SOLUTION INTRAVENOUS; SUBCUTANEOUS at 13:03

## 2018-05-22 RX ADMIN — LEVOTHYROXINE SODIUM 88 MCG: 88 TABLET ORAL at 07:17

## 2018-05-22 RX ADMIN — Medication 1 TABLET: at 20:52

## 2018-05-22 RX ADMIN — Medication 1 CAPSULE: at 10:12

## 2018-05-22 RX ADMIN — Medication 10 ML: at 07:17

## 2018-05-22 RX ADMIN — SPIRONOLACTONE 25 MG: 25 TABLET, FILM COATED ORAL at 10:13

## 2018-05-22 NOTE — PROGRESS NOTES
Spiritual Care Partner Volunteer visited patient in Rm 449 on 5/22/18. Documented by:   Chaplain Landaverde MDiv, MACE  [phone])

## 2018-05-22 NOTE — PROGRESS NOTES
Harhs Porter MD   Phone/text: [phone] (7am to 7pm)  After 7pm please call  for hospitalist on call    Hospitalist Progress Note     5/22/2018   PCP:  Dr. Swetha Saha MD  Chief Complaint   Patient presents with    Shortness of Breath       Admission Summary:   The patient is an 53-year-old  female with multiple past medical history, including diabetes mellitus, rheumatoid arthritis, metastatic left breast cancer, bilateral pleural effusions, status post thoracocentesis, non-ST elevation myocardial infarction, anemia, hypothyroidism and gastroesophageal reflux disease. She was recently admitted here at [facility], and she was in the hospital for a prolonged period of time, from 04/16 and was discharged on 05/11, for NSTEMI and bilateral pleural effusion. The patient was discharged to [facility]. Early this morning, she was again brought from [facility] to the emergency department, because she was found to be short of breath and agitated. Reason For Visit:  NSTEMI, heart failure    Assessment/Plan   Acute on chronic systolic heart failure (POA) - NHYA4 on presentation, breathing much better  - CXR 5/19 with bilateral pleural effusion L>R, diffuse interstitial edema  - Echo 4/26 with mild hypokinesis of the basal-mid anteroseptal and apical septal walls, moderate MR, small pericardial effusion  - Continue lasix IV for diuresis- changed to PO by cards. - Continue carvedilol, lisinopril, spironolactone  - Cardiology consulted    NSTEMI (POA) - s/p cath with severe 3 vessel disease. Not candidate for CABG or stents per cards medical management. - ECG 5/19 with sinus rhythm, old q waves in the anterior leads, old t-wave inversions in the lateral leads.  ST depression no longer present in the inferior leads  - Troponin markedly elevated  - Continue aspirin, carvedilol  - Cardiology consulted    Bilateral pleural effusions (POA) - suspect malignant but cytology from thoracentesis  negative, looks stable compared to 2 weeks ago  - CT chest  with bilateral pleural effusions, pericardial effusion, skeletal mets, left breast mass, left staghorn calculus in the left kidney. CXR in AM to see if they drainable. Hypoxia (POA) - due to heart failure, pleural effusions, NSTEMI. Improving  - Supplemental O2    Metastatic left breast cancer ER/ME +, Her-2 - (chronic)  - Continue anastrozole    DM2 with hyperglycemia - recent A1c 7, stable  - Continue januvia, amaryl  - SSI as needed    Hypothyroidism (chronic)  - TSH mildly elevated. Suspect sick euthyroid, will check T3, T4  - Continue synthroid    Anemia (chronic) - stable    RA (chronic) - receives outpatient etanercept    HLD (chronic) - statin allergy, receives outpatient evolocumab    See orders for other plans. VTE prophylaxis: enoxaparin  Discussed plan of care with Patient/Family and Nurse   Pre-admission lived at home but was at [facility]  Discharge plan: back to [facility]  Estimated time to discharge: unclear     Subjective   Ms. Ledesma is feeling fine this morning. She denies chest pain, SOB, palpitations. Reviewed interval history  Physical examination     Visit Vitals    /55    Pulse 85    Temp 97.9 °F (36.6 °C)    Resp 17    Wt 53.5 kg (118 lb)    SpO2 95%    Breastfeeding No    BMI 22.3 kg/m2       Temp (24hrs), Av.1 °F (36.7 °C), Min:97.7 °F (36.5 °C), Max:98.8 °F (37.1 °C)    No intake or output data in the 24 hours ending 18    Gen - NAD  HEENT - MMM  Neck - supple, full ROM  CV - RRR, +s4, 2/6 systolic murmur  Resp - lungs decreased at bases with crackles, no wheezing, normal WOB  GI - abdomen S, NT, ND, +BS.  No hepatosplenomegaly   - no CVA tenderness, bladder non-palpable in lower abdomen  MSK - normal tone and bulk, no edema  Neuro - A&O, no focal deficits  Psych - calm and cooperative with normal affect    Data Review       Telemetry x   ECG    Xray    CT scan MRI    Echocardiogram    Ultrasound              I have reviewed the flow sheet and recent notes  New labs and data personally reviewed.     Recent Labs      05/22/18 0446  05/21/18   0442  05/20/18   0359   WBC  5.8  6.1  6.2   HGB  9.6*  9.4*  9.3*   HCT  30.9*  30.5*  29.9*   PLT  296  296  266     Recent Labs      05/22/18 0446  05/21/18 0442  05/20/18   0359   NA  137  136  135*   K  4.1  4.5  4.1   CL  102  101  102   CO2  25  25  26   GLU  163*  97  156*   BUN  24*  26*  25*   CREA  1.04*  1.06*  1.05*   CA  8.5  8.9  8.1*   MG   --    --   1.6       Medications reviewed  Current Facility-Administered Medications   Medication Dose Route Frequency    evolocumab (REPATHA SURECLICK) pen injection 643 mg (Patient Supplied)  140 mg SubCUTAneous Q 14 DAYS    [START ON 5/23/2018] furosemide (LASIX) tablet 20 mg  20 mg Oral DAILY    b-complex with vitamin c tablet 1 Tab (Patient Supplied)  1 Tab Oral DAILY    heparin (porcine) 1,000 unit/mL injection        0.9% sodium chloride (MBP/ADV) infusion        sodium chloride (NS) flush 5-10 mL  5-10 mL IntraVENous Q8H    sodium chloride (NS) flush 5-10 mL  5-10 mL IntraVENous PRN    clopidogrel (PLAVIX) tablet 75 mg  75 mg Oral DAILY    HYDROcodone-acetaminophen (NORCO) 5-325 mg per tablet 1 Tab  1 Tab Oral Q4H PRN    fentaNYL citrate (PF) injection 25 mcg  25 mcg IntraVENous Q4H PRN    acetaminophen (TYLENOL) tablet 650 mg  650 mg Oral Q4H PRN    glucose chewable tablet 16 g  4 Tab Oral PRN    dextrose (D50W) injection syrg 12.5-25 g  12.5-25 g IntraVENous PRN    glucagon (GLUCAGEN) injection 1 mg  1 mg IntraMUSCular PRN    insulin lispro (HUMALOG) injection   SubCUTAneous AC&HS    anastrozole (ARIMIDEX) tablet 1 mg  1 mg Oral DAILY    carvedilol (COREG) tablet 6.25 mg  6.25 mg Oral BID WITH MEALS    docusate sodium (COLACE) capsule 100 mg  100 mg Oral BID    levothyroxine (SYNTHROID) tablet 88 mcg  88 mcg Oral ACB    lactobac ac& margarethanim (BERYL Q/RISAQUAD)  1 Cap Oral DAILY    glimepiride (AMARYL) tablet 1 mg  1 mg Oral 7am    lisinopril (PRINIVIL, ZESTRIL) tablet 2.5 mg  2.5 mg Oral DAILY    polyethylene glycol (MIRALAX) packet 17 g  17 g Oral DAILY    spironolactone (ALDACTONE) tablet 25 mg  25 mg Oral DAILY    folic acid (FOLVITE) tablet 1 mg  1 mg Oral DAILY    SITagliptin (JANUVIA) tablet tab 50 mg  50 mg Oral DAILY    aspirin delayed-release tablet 81 mg  81 mg Oral DAILY         Sandra Henderson MD  Internal Medicine  5/22/2018

## 2018-05-22 NOTE — DIABETES MGMT
DTC Progress Note    Recommendations/ Comments: Chart reviewed due to hyperglycemia. FBG in range; post prandial BG's . 190 mg/dL. Pre lunch today was  295 mg/dL  Noted pt NPO yesterday and did not receive Glimeperide or Januvia. If appropriate, please consider   Increasing Glimepiride to 2 mg daily  adding carb consistent to diet order. Current hospital DM medication: Amaryl 1 mg, Januvia 50 mg and correction scale Humalog, normal sensitivity. Chart reviewed on Sandy Ledesma. Patient is a 82 y.o. female with known diabetes on Amaryl 1 mg and Januvia 25 mg at home. A1c:   Lab Results   Component Value Date/Time    Hemoglobin A1c 7.0 (H) 04/18/2018 08:14 AM    Hemoglobin A1c 6.9 (H) 11/30/2011 08:30 AM       Recent Glucose Results:   Lab Results   Component Value Date/Time     (H) 05/22/2018 04:46 AM    GLUCPOC 295 (H) 05/22/2018 12:05 PM    GLUCPOC 159 (H) 05/22/2018 07:10 AM    GLUCPOC 222 (H) 05/21/2018 09:12 PM        Lab Results   Component Value Date/Time    Creatinine 1.04 (H) 05/22/2018 04:46 AM     Estimated Creatinine Clearance: 32 mL/min (based on Cr of 1.04). Active Orders   Diet    DIET CARDIAC Regular        PO intake: If appropriate, please consider   Increasing Glimepiride to 2 mg daily  adding carb consistent to diet order. Patient Vitals for the past 72 hrs:   % Diet Eaten   05/21/18 1756 90 %   05/20/18 1201 100 %   05/19/18 2002 75 %       Will continue to follow as needed.     Thank you  Elier Morales RN, CDE  Pager [phone]

## 2018-05-22 NOTE — PROGRESS NOTES
Reason for Admission:   CHF               RRAT Score:     56             Resources/supports as identified by patient/family:   Patient has a very supportive family. She has a personal care aide 5 x week for 8 hours a day. Top Challenges facing patient (as identified by patient/family and CM): Patient has diabetes mellitus, rheumatoid arthritis, metastatic left breast cancer, bilateral pleural effusions, status post thoracocentesis, non-ST elevation myocardial infarction, anemia, hypothyroidism and gastroesophageal reflux disease. Finances/Medication cost?  Patient has [employer]? Her sons will provide transportation back to [place] or lack thereof? No issues                     Living arrangements? Patient lives with her son and has an aide to care for patient           Self-care/ADL's/Cognition? Patient needs assistance with her ADL's and IADL's. She is alert and pleasant          Current Advanced Directive/Advance Care Plan:  No ACP and not interested                          Plan for utilizing home health: Not indicated at this time                         Likelihood of readmission: High. Patient was recently discharged from [facility] on 5/11/18 to [facility] for rehab                  Transition of Care Plan:  CM has sent a referral to [facility] via [employer]. Also spoke with Salina Covarrubias at [facility] regarding referral, she said patient will be re-evaluated and a new auth submitted to her insurance. CM requested that an auth be submitted ASAP. Abel Olivares MSA RN, CRM. Care Management Interventions  PCP Verified by CM: Yes  Palliative Care Criteria Met (RRAT>21 & CHF Dx)?: Yes  Palliative Consult Recommended?: No  Reason Palliative Care Not Recommended?: Provider preference to wait  Mode of Transport at Discharge: Other (see comment) (Private car)  Transition of Care Consult (CM Consult):  Other (Inpatient Rehab[facility])  Neelima Signup: No  Discharge Durable Medical Equipment: No  Health Maintenance Reviewed: Yes  Physical Therapy Consult: Yes  Occupational Therapy Consult: Yes  Speech Therapy Consult: No  Current Support Network: Lives with Caregiver  Confirm Follow Up Transport: Family  Plan discussed with Pt/Family/Caregiver: Yes  Freedom of Choice Offered: Yes  Discharge Location  Discharge Placement: Rehab hospital/unit acute

## 2018-05-22 NOTE — PROGRESS NOTES
Problem: Mobility Impaired (Adult and Pediatric)  Goal: *Acute Goals and Plan of Care (Insert Text)  Physical Therapy Goals  Initiated 05/22/18    1. Patient will move from supine to sit and sit to supine , scoot up and down and roll side to side in bed with modified independence within 7 day(s). 2.  Patient will transfer from bed to chair and chair to bed with modified independence using the least restrictive device within 7 day(s). 3.  Patient will perform sit to stand with supervision/SBA within 7 day(s). 4.  Patient will ambulate with supervision for 100 feet with the least restrictive device within 7 day(s). physical Therapy EVALUATION  Patient: Florian West [de-identified]82 y.o. female)  Date: 5/22/2018  Primary Diagnosis: Acute on chronic systolic HF (heart failure) ([facility])        Precautions: Fall, limb alert LUE       ASSESSMENT :  Based on the objective data described below, the patient presents with decreased functional independence compared to baseline with associated decreased ROM, decreased strength, impaired standing balance, decreased tolerance to activity, poor transfer mechanics, and gait deviations. RN cleared patient for mobility and was received in chair agreeable to participate. PTA at [facility] patient ambulated 50-100ft with rollator and wheelchair follow per patient/son report. Today patient required Max A x 1 and additional time to stand due to poor mechanics/posterior lean. Once standing in RW, patient ambulated 60 ft (45 ft, seated break, 15 ft) with CGA and additional time with step to pattern and fair/good supported standing balance. Family arrived during seated rest break and provided supplemental historical info (slow decline from February until previous admission, [facility], readmitted). Patient returned to chair at end of session with all needs placed within reach and RN notified of patient's status.  Discharge rec - IPR as patient is highly motivated, can tolerate 3 hours of activity from >1 skilled discipline and has excellent family support. Patient will benefit from skilled intervention to address the above impairments. Patients rehabilitation potential is considered to be Good  Factors which may influence rehabilitation potential include:   [x]         None noted  []         Mental ability/status  []         Medical condition  []         Home/family situation and support systems  []         Safety awareness  []         Pain tolerance/management  []         Other:      PLAN :  Recommendations and Planned Interventions:  [x]           Bed Mobility Training             [x]    Neuromuscular Re-Education  [x]           Transfer Training                   []    Orthotic/Prosthetic Training  [x]           Gait Training                         []    Modalities  [x]           Therapeutic Exercises           []    Edema Management/Control  [x]           Therapeutic Activities            [x]    Patient and Family Training/Education  []           Other (comment):    Frequency/Duration: Patient will be followed by physical therapy  5 times a week to address goals.   Discharge Recommendations: Inpatient Rehab  Further Equipment Recommendations for Discharge: TBD pending rehab - maybe RW     SUBJECTIVE:   Patient stated its all because I had a sturdy therapist .[place] DATA SUMMARY:   HISTORY:    Past Medical History:   Diagnosis Date    Anemia 9/2/2009    Colon cancer ([facility] 9/2/2009    surgery/chemo    Colon cancer ([facility] 9/2/2009    DM (diabetes mellitus) ([facility] 9/2/2009    GERD (gastroesophageal reflux disease)     H/O: CVA 9/2/2009    slight l sided weakness    Hypothyroid 9/2/2009    Ill-defined condition     seasonal allergies    Microalbuminuria 9/2/2009    Osteoporosis 9/2/2009    Other and unspecified hyperlipidemia 1/27/2010    Rheumatoid arthritis involving ankle ([facility] 9/28/2016    Rheumatoid arthritis(714.0) 9/2/2009    Unspecified essential hypertension 9/2/2009    Weakness due to cerebrovascular accident      Past Surgical History:   Procedure Laterality Date    HX COLECTOMY      HX HYSTERECTOMY      HX OTHER SURGICAL      colonoscopies numerous since 1993     Prior Level of Function/Home Situation: physical decline since feb. Required assistance ambulating household distances. In December able to shop in [employer] for 30-45min with rollator and no rest breaks   Personal factors and/or comorbidities impacting plan of care: fatigue    Home Situation  Home Environment: [address] [place] [address] Name: [facility]  # Steps to Enter: 0  One/Two Story Residence: One story  Living Alone: No  Support Systems: Family member(s), Inpatient rehab, Skilled nursing facility  Patient Expects to be Discharged to[de-identified] Rehabilitation facility  Current DME Used/Available at Home: Oxygen, portable, Wheelchair    EXAMINATION/PRESENTATION/DECISION MAKING:   Critical Behavior:  Neurologic State: Alert  Orientation Level: Oriented X4  Cognition: Appropriate decision making, Appropriate safety awareness     Hearing: Auditory  Auditory Impairment: None  Skin:    Edema:   Range Of Motion:  AROM: Generally decreased, functional                       Strength:    Strength: Generally decreased, functional                    Tone & Sensation:                                  Coordination:  Coordination: Within functional limits  Vision:      Functional Mobility:  Bed Mobility:     Supine to Sit:  (received in chair)  Sit to Supine:  (ended in chair)     Transfers:  Sit to Stand: Assist x1;Maximum assistance; Additional time  Stand to Sit: Assist x1;Minimum assistance                       Balance:   Sitting: Intact  Standing: Intact; With support  Ambulation/Gait Training:  Distance (ft): 60 Feet (ft) (one seated rest break @ 45 feet)  Assistive Device: Gait belt;Walker, rolling  Ambulation - Level of Assistance: Contact guard assistance        Gait Abnormalities: Decreased step clearance;Trunk sway increased; Step to gait              Speed/Haylee: Pace decreased (<100 feet/min); Shuffled  Step Length: Left shortened;Right shortened                     Stairs: Therapeutic Exercises:       Functional Measure:  Tinetti test:    Sitting Balance: 1  Arises: 0  Attempts to Rise: 0  Immediate Standing Balance: 1  Standing Balance: 1  Nudged: 1  Eyes Closed: 0  Turn 360 Degrees - Continuous/Discontinuous: 0  Turn 360 Degrees - Steady/Unsteady: 1  Sitting Down: 1  Balance Score: 6  Indication of Gait: 1  R Step Length/Height: 0  L Step Length/Height: 1  R Foot Clearance: 1  L Foot Clearance: 1  Step Symmetry: 0  Step Continuity: 1  Path: 1  Trunk: 0  Walking Time: 0  Gait Score: 6  Total Score: 12       Tinetti Test and G-code impairment scale:  Percentage of Impairment CH    0%   CI    1-19% CJ    20-39% CK    40-59% CL    60-79% CM    80-99% CN     100%   Tinetti  Score 0-28 28 23-27 17-22 12-16 6-11 1-5 0       Tinetti Tool Score Risk of Falls  <19 = High Fall Risk  19-24 = Moderate Fall Risk  25-28 = Low Fall Risk  Tinetti ME. Performance-Oriented Assessment of Mobility Problems in Elderly Patients. [address]; [id number]. (Scoring Description: PT Bulletin Feb. 10, 1993)    Older adults: Kaden Whitaker et al, 2009; n = 1000 [facility] elderly evaluated with ABC, SHANNON, ADL, and IADL)  · Mean SHANNON score for males aged 69-68 years = 26.21(3.40)  · Mean SHANNON score for females age 69-68 years = 25.16(4.30)  · Mean SHANNON score for males over 80 years = 23.29(6.02)  · Mean SHANNON score for females over 80 years = 17.20(8.32)         G codes: In compliance with CMSs Claims Based Outcome Reporting, the following G-code set was chosen for this patient based on their primary functional limitation being treated: The outcome measure chosen to determine the severity of the functional limitation was the tinetti with a score of 12/28 which was correlated with the impairment scale.     ? Mobility - Walking and Moving Around:  - CURRENT STATUS: CK - 40%-59% impaired, limited or restricted    - GOAL STATUS: CJ - 20%-39% impaired, limited or restricted    - D/C STATUS:  ---------------To be determined---------------      Physical Therapy Evaluation Charge Determination   History Examination Presentation Decision-Making   MEDIUM  Complexity : 1-2 comorbidities / personal factors will impact the outcome/ POC  LOW Complexity : 1-2 Standardized tests and measures addressing body structure, function, activity limitation and / or participation in recreation  LOW Complexity : Stable, uncomplicated  Other outcome measures Tinetti  MEDIUM      Based on the above components, the patient evaluation is determined to be of the following complexity level: LOW     Pain:                    Activity Tolerance:   Fair, rest break at 45ft, no adverse signs/symptoms reported   Please refer to the flowsheet for vital signs taken during this treatment. After treatment:   [x]         Patient left in no apparent distress sitting up in chair  []         Patient left in no apparent distress in bed  [x]         Call bell left within reach  [x]         Nursing notified  []         Caregiver present  []         Bed alarm activated    COMMUNICATION/EDUCATION:   The patients plan of care was discussed with: Registered Nurse. [x]         Fall prevention education was provided and the patient/caregiver indicated understanding. [x]         Patient/family have participated as able in goal setting and plan of care. [x]         Patient/family agree to work toward stated goals and plan of care. []         Patient understands intent and goals of therapy, but is neutral about his/her participation. []         Patient is unable to participate in goal setting and plan of care.     Thank you for this referral.  Elayne Breen PT, DPT   Time Calculation: 20 mins

## 2018-05-22 NOTE — PROGRESS NOTES
Cardiology Progress Note            Admit Date: 5/19/2018  Admit Diagnosis: Acute on chronic systolic HF (heart failure) ([facility]  Date: 5/22/2018     Time: 4:10 PM      Subjective:   Sandy Ledesma denies chest pain and SOB. States she feels better. Son at bedside on rounds- questions answered. Assessment and Plan     1. NSTEMI, CAD:  S/p [facility] with severe 3 vessel disease  -Not a candidate for surgical intervention. Medical management  -Continue ASA, plavix, BB, Ace-I  Stop ACS lovenox  -Start repatha- pt may use dosing from home  -On fishoil.   -Advanced directives:  Currently full code noted on chart. 2. ICM/Acute on chronic systolic CHF:  NYHA class III  -EF 45% on TTE 4/26/18. Mild hypok basial mid anteroseptal and apical septal walls. Mod MR  -Continue ace-I, BB, aldactone  -Change lasix to 20 mg po daily starting in a.m.  -daily weight and I/Os- net -905+/24 hours     3. Mitral regurgitation:  Moderate on TTE 4/26/2018      4. CKD:   Creatinine improving 1.04, GFR 52.      5. Anemia:hgb 9.6, stable.     -On ferrous sulfate    6. HTN- BP controlled  -Coreg 6.25 mg BID  -Lisinopril 2.5 mg daily  -Aldactone 25 mg daily       6. Hx of Colon cancer, now metastatic breast CA   -on arimedex    7. Hx of DM: management per primary team.       Pt with systolic CHF/Cardiomyopathy and severe 3 vessal CAD, NSTEMI- not candidate for CABG. Medical management. Will change IV lasix to po and add back repatha. OK for [facility] to use pt's supply once med brought in by son and approved by pharmacy. Cardiology Attending:Patient seen and examined. I agree with NP assessment and plans. Stable would continue on Plavix.     Herbert Jung MD 5/22/2018 4:48 PM       Objective:      Physical Exam:                Visit Vitals    /53 (BP 1 Location: Right arm, BP Patient Position: At rest)    Pulse 83    Temp 97.9 °F (36.6 °C)    Resp 17    Wt 53.5 kg (118 lb)    SpO2 95%    Breastfeeding No    BMI 22.3 kg/m2          General Appearance:   Well developed, frail, alert and oriented x 3, and   individual in no acute distress. Ears/Nose/Mouth/Throat:    Hearing grossly normal.         Neck:  Supple. Chest:    Lungs few crackles bilateral bases, diminished bases. No SOB at rest.   Cardiovascular:   Regular rate and rhythm, S1, S2 normal, no murmur. Abdomen:    Soft, non-tender, bowel sounds are active. Extremities:  No edema bilaterally. Skin:  Warm and dry.      Telemetry: normal sinus rhythm          Data Review:    Labs:    Recent Results (from the past 24 hour(s))   GLUCOSE, POC    Collection Time: 05/21/18  4:41 PM   Result Value Ref Range    Glucose (POC) 160 (H) 65 - 100 mg/dL    Performed by Cherelle Gonzalez    GLUCOSE, POC    Collection Time: 05/21/18  9:12 PM   Result Value Ref Range    Glucose (POC) 222 (H) 65 - 100 mg/dL    Performed by Fatmata Fajardo    CBC W/O DIFF    Collection Time: 05/22/18  4:46 AM   Result Value Ref Range    WBC 5.8 3.6 - 11.0 K/uL    RBC 3.56 (L) 3.80 - 5.20 M/uL    HGB 9.6 (L) 11.5 - 16.0 g/dL    HCT 30.9 (L) 35.0 - 47.0 %    MCV 86.8 80.0 - 99.0 FL    MCH 27.0 26.0 - 34.0 PG    MCHC 31.1 30.0 - 36.5 g/dL    RDW 19.6 (H) 11.5 - 14.5 %    PLATELET 366 812 - 516 K/uL    MPV 10.0 8.9 - 12.9 FL    NRBC 0.0 0  WBC    ABSOLUTE NRBC 0.00 0.00 - 6.73 K/uL   METABOLIC PANEL, BASIC    Collection Time: 05/22/18  4:46 AM   Result Value Ref Range    Sodium 137 136 - 145 mmol/L    Potassium 4.1 3.5 - 5.1 mmol/L    Chloride 102 97 - 108 mmol/L    CO2 25 21 - 32 mmol/L    Anion gap 10 5 - 15 mmol/L    Glucose 163 (H) 65 - 100 mg/dL    BUN 24 (H) 6 - 20 MG/DL    Creatinine 1.04 (H) 0.55 - 1.02 MG/DL    BUN/Creatinine ratio 23 (H) 12 - 20      GFR est AA >60 >60 ml/min/1.73m2    GFR est non-AA 51 (L) >60 ml/min/1.73m2    Calcium 8.5 8.5 - 10.1 MG/DL   GLUCOSE, POC    Collection Time: 05/22/18  7:10 AM   Result Value Ref Range Glucose (POC) 159 (H) 65 - 100 mg/dL    Performed by Eric Nelson    GLUCOSE, POC    Collection Time: 05/22/18 12:05 PM   Result Value Ref Range    Glucose (POC) 295 (H) 65 - 100 mg/dL    Performed by Juhi Pérez           Radiology:        Current Facility-Administered Medications   Medication Dose Route Frequency    heparin (porcine) 1,000 unit/mL injection        0.9% sodium chloride (MBP/ADV) infusion        sodium chloride (NS) flush 5-10 mL  5-10 mL IntraVENous Q8H    sodium chloride (NS) flush 5-10 mL  5-10 mL IntraVENous PRN    clopidogrel (PLAVIX) tablet 75 mg  75 mg Oral DAILY    enoxaparin (LOVENOX) injection 50 mg  50 mg SubCUTAneous Q12H    HYDROcodone-acetaminophen (NORCO) 5-325 mg per tablet 1 Tab  1 Tab Oral Q4H PRN    fentaNYL citrate (PF) injection 25 mcg  25 mcg IntraVENous Q4H PRN    acetaminophen (TYLENOL) tablet 650 mg  650 mg Oral Q4H PRN    glucose chewable tablet 16 g  4 Tab Oral PRN    dextrose (D50W) injection syrg 12.5-25 g  12.5-25 g IntraVENous PRN    glucagon (GLUCAGEN) injection 1 mg  1 mg IntraMUSCular PRN    insulin lispro (HUMALOG) injection   SubCUTAneous AC&HS    anastrozole (ARIMIDEX) tablet 1 mg  1 mg Oral DAILY    carvedilol (COREG) tablet 6.25 mg  6.25 mg Oral BID WITH MEALS    docusate sodium (COLACE) capsule 100 mg  100 mg Oral BID    levothyroxine (SYNTHROID) tablet 88 mcg  88 mcg Oral ACB    lactobac ac& pc-s.therm-b.anim (BERYL Q/RISAQUAD)  1 Cap Oral DAILY    glimepiride (AMARYL) tablet 1 mg  1 mg Oral 7am    lisinopril (PRINIVIL, ZESTRIL) tablet 2.5 mg  2.5 mg Oral DAILY    polyethylene glycol (MIRALAX) packet 17 g  17 g Oral DAILY    spironolactone (ALDACTONE) tablet 25 mg  25 mg Oral DAILY    folic acid (FOLVITE) tablet 1 mg  1 mg Oral DAILY    SITagliptin (JANUVIA) tablet tab 50 mg  50 mg Oral DAILY    aspirin delayed-release tablet 81 mg  81 mg Oral DAILY    furosemide (LASIX) injection 40 mg  40 mg IntraVENous DAILY          Ayan Knapp. MARIETTA [place]     [facility] [address] [place] [address] [address]   [facility]   [phone]

## 2018-05-22 NOTE — PROGRESS NOTES
TRANSFER - IN REPORT:    Verbal report received from [facility] on [address]  being received from procedure for routine progression of care. Report consisted of patients Situation, Background, Assessment and Recommendations(SBAR). Information from the following report(s) Procedure Summary, MAR and Recent Results was reviewed with the receiving clinician. Opportunity for questions and clarification was provided. Assessment completed upon patients arrival to [address] [place] [address] and care assumed. Cardiac Cath Lab Recovery Arrival Note:    Sandy Ledesma arrived to [facility] recovery area. Patient procedure= LHC. Patient on cardiac monitor, non-invasive blood pressure, SPO2 monitor. On O2 @ 2 lpm via n/c. IV  of nacl on pump at 78.6 ml/hr. Patient status doing well without problems. Patient is A&Ox 4. Patient reports no complaints. PROCEDURE SITE CHECK:    Procedure site:without any bleeding and or hematoma, no pain/discomfort reported at procedure site. No change in patient status. Continue to monitor patient and status.       Son in to see pt and talked with Dr Luís Snow

## 2018-05-22 NOTE — PROGRESS NOTES
Problem: Self Care Deficits Care Plan (Adult)  Goal: *Acute Goals and Plan of Care (Insert Text)  Occupational Therapy Goals  Initiated 5/22/2018  1. Patient will perform upper body dressing with independence within 7 day(s). 2.  Patient will perform lower body dressing with minimal assistance/contact guard assist within 7 day(s). 3.  Patient will perform grooming at the sink with supervision/set-up within 7 day(s). 4.  Patient will perform toilet transfers with minimal assistance/contact guard assist within 7 day(s). 5.  Patient will perform all aspects of toileting with minimal assistance/contact guard assist within 7 day(s). 6.  Patient will participate in upper extremity therapeutic exercise/activities with supervision/set-up for 10 minutes within 7 day(s). 7.  Patient will utilize energy conservation techniques during functional activities with verbal cues within 7 day(s). Occupational Therapy EVALUATION  Patient: Erica Campos [de-identified]82 y.o. female)  Date: 5/22/2018  Primary Diagnosis: Acute on chronic systolic HF (heart failure) (HCC)        Precautions:      ASSESSMENT :  Based on the objective data described below, the patient presents with overall supervision-Min A for upper body ADLs, Mod A for lower body ADLs, and CGA-Mod A x1 for functional mobility with RW s/p admission for acute on chronic CHF. Patient admitted from [facility], received in the chair, agreeable to therapy. Requiring Mod A x1 and RW for functional transfers, with verbal cues for scooting & leaning forward to assist with ease of transfers. Ambulated to/from bathroom with CGA and RW to complete grooming tasks standing at the sink, requiring cues for avoiding obstacles in peripheral vision. Returned to chair, all needs met. Patient would benefit from d/c back to inpatient rehab to maximize safety and functional independence prior to returning home with her sons.      Recommend with nursing patient to complete as able in order to maintain strength, endurance and independence: ADLs with supervision/setup, OOB to chair 3x/day and mobilizing to the bathroom for toileting with up to Mod A x1 and RW (**verbal cues for forward scooting and leaning before sit<>stand**). Thank you for your assistance. Patient will benefit from skilled intervention to address the above impairments. Patients rehabilitation potential is considered to be Good  Factors which may influence rehabilitation potential include:   []             None noted  []             Mental ability/status  []             Medical condition  []             Home/family situation and support systems  []             Safety awareness  []             Pain tolerance/management  []             Other:      PLAN :  Recommendations and Planned Interventions:  [x]               Self Care Training                  [x]        Therapeutic Activities  [x]               Functional Mobility Training    []        Cognitive Retraining  [x]               Therapeutic Exercises           [x]        Endurance Activities  [x]               Balance Training                   []        Neuromuscular Re-Education  [x]               Visual/Perceptual Training     [x]   Home Safety Training  [x]               Patient Education                 [x]        Family Training/Education  []               Other (comment):    Frequency/Duration: Patient will be followed by occupational therapy 5 times a week to address goals. Discharge Recommendations: Return to Inpatient Rehab  Further Equipment Recommendations for Discharge: TBD by rehab     SUBJECTIVE:   Patient stated I just went on a walk with the other antonella.     OBJECTIVE DATA SUMMARY:   HISTORY:   Past Medical History:   Diagnosis Date    Anemia 9/2/2009    Colon cancer ([facility] 9/2/2009    surgery/chemo    Colon cancer ([facility] 9/2/2009    DM (diabetes mellitus) ([facility] 9/2/2009    GERD (gastroesophageal reflux disease)     H/O: CVA 9/2/2009    slight l sided weakness    Hypothyroid 9/2/2009    Ill-defined condition     seasonal allergies    Microalbuminuria 9/2/2009    Osteoporosis 9/2/2009    Other and unspecified hyperlipidemia 1/27/2010    Rheumatoid arthritis involving ankle ([address] 9/28/2016    Rheumatoid arthritis(714.0) 9/2/2009    Unspecified essential hypertension 9/2/2009    Weakness due to cerebrovascular accident      Past Surgical History:   Procedure Laterality Date    HX COLECTOMY      HX HYSTERECTOMY      HX OTHER SURGICAL      colonoscopies numerous since 1993       Prior Level of Function/Environment/Context: PTA, patient was receiving assist from a caregiver 8 hrs/day for ADLs. Admitted from [address] [place] [address], reporting she was ambulating 50'. Lives with her sons in a 1 story house, family completes IADLs. Home Situation  Home Environment: [address] [place] [address] Name: [address] [place] [address]  # Steps to Enter: 0  One/Two Story Residence: One story  Living Alone: No  Support Systems: Family member(s), Inpatient rehab, Skilled nursing facility  Patient Expects to be Discharged to[de-identified] Rehabilitation facility  Current DME Used/Available at Home: Oxygen, portable, Wheelchair  Tub or Shower Type: Shower  [x]  Right hand dominant   []  Left hand dominant    EXAMINATION OF PERFORMANCE DEFICITS:  Cognitive/Behavioral Status:  Neurologic State: Alert  Orientation Level: Oriented X4  Cognition: Appropriate decision making; Follows commands  Perception: Appears intact  Perseveration: No perseveration noted  Safety/Judgement: Awareness of environment; Fall prevention    Skin: Appears intact    Edema: None noted in BUEs    Hearing: Auditory  Auditory Impairment: None    Vision/Perceptual:    Tracking: Able to track stimulus in all quadrants w/o difficulty    Visual Fields:  (Slightly decreased peripheral vision/attn with ambulation)  Acuity: Within Defined Limits         Range of Motion:  In BUEs  AROM: Generally decreased, functional       Strength:   In BUEs  Strength: Generally decreased, functional     Coordination:  Coordination: Generally decreased, functional  Fine Motor Skills-Upper: Left Intact; Right Intact    Gross Motor Skills-Upper: Left Intact; Right Intact    Tone & Sensation:  In BUEs     Sensation:  (Occasional numbness in fingers)     Balance:  Sitting: Intact  Standing: Intact; With support (RW)    Functional Mobility and Transfers for ADLs:  Bed Mobility:  Supine to Sit:  (received in chair)  Sit to Supine:  (ended in chair)    Transfers:  Sit to Stand: Moderate assistance;Assist x1 (Verbal cues for positioning)  Stand to Sit: Assist x1;Minimum assistance  Toilet Transfer : Moderate assistance;Assist x1 (Verbal cues for positioning)    ADL Assessment:  Feeding: Independent*    Oral Facial Hygiene/Grooming: Supervision    Bathing: Moderate assistance*    Upper Body Dressing: Minimum assistance*    Lower Body Dressing: Moderate assistance    Toileting: Minimum assistance*   *Inferred per activity tolerance, functional mobility, balance, BUE ROM/strength, functional reach, and safety awareness             ADL Intervention and task modifications:    Grooming  Grooming Assistance: Contact guard assistance (Standing at the sink)  Washing Face: Contact guard assistance  Washing Hands: Contact guard assistance  Brushing Teeth: Contact guard assistance  Cues: Tactile cues provided;Verbal cues provided    Lower Body Dressing Assistance  Dressing Assistance: Moderate assistance  Socks: Moderate assistance (Simulated)  Position Performed: Seated in chair  Cues: Verbal cues provided         Cognitive Retraining  Safety/Judgement: Awareness of environment; Fall prevention    Therapeutic Exercise:  - Patient completing functional transfers with Mod A x1 & RW, with verbal cues for positioning and leaning over toes to achieve stand   - Ambulated to/from bathroom with CGA & RW, standing at the sink to complete grooming tasks x10 min    Functional Measure:  Barthel Index:    Bathin  Bladder: 10  Bowels: 10  Groomin  Dressin  Feeding: 10  Mobility: 10  Stairs: 0  Toilet Use: 5  Transfer (Bed to Chair and Back): 10  Total: 65       Barthel and G-code impairment scale:  Percentage of impairment CH  0% CI  1-19% CJ  20-39% CK  40-59% CL  60-79% CM  80-99% CN  100%   Barthel Score 0-100 100 99-80 79-60 59-40 20-39 1-19   0   Barthel Score 0-20 20 17-19 13-16 9-12 5-8 1-4 0      The Barthel ADL Index: Guidelines  1. The index should be used as a record of what a patient does, not as a record of what a patient could do. 2. The main aim is to establish degree of independence from any help, physical or verbal, however minor and for whatever reason. 3. The need for supervision renders the patient not independent. 4. A patient's performance should be established using the best available evidence. Asking the patient, friends/relatives and nurses are the usual sources, but direct observation and common sense are also important. However direct testing is not needed. 5. Usually the patient's performance over the preceding 24-48 hours is important, but occasionally longer periods will be relevant. 6. Middle categories imply that the patient supplies over 50 per cent of the effort. 7. Use of aids to be independent is allowed. Maricel Seo., Barthel, DJaimeW. ([id number]). Functional evaluation: the Barthel Index. [facility] (14)2. Rolando Francisco sandra Shaniqua, J.J.M.SERGO, Adiel Cox., Stpeh Strong, Precious, [address] [place] [address] (). Measuring the change indisability after inpatient rehabilitation; comparison of the responsiveness of the Barthel Index and Functional [place] Measure. Journal of Neurology, Neurosurgery, and Psychiatry, 66(4), 351-966. Mario Dykes, N.J.A, Katerina Cox  W.J.M, & Deyvi Aleman M.A. (2004.) Assessment of post-stroke quality of life in cost-effectiveness studies: The usefulness of the Barthel Index and the EuroQoL-5D.  Quality of Life Research, 13, 193-00         G codes:  In compliance with CMSs Claims Based Outcome Reporting, the following G-code set was chosen for this patient based on their primary functional limitation being treated: The outcome measure chosen to determine the severity of the functional limitation was the Barthel Index with a score of 65/100 which was correlated with the impairment scale. ? Self Care:     - CURRENT STATUS: CJ - 20%-39% impaired, limited or restricted    - GOAL STATUS: CI - 1%-19% impaired, limited or restricted    - D/C STATUS:  ---------------To be determined---------------     Occupational Therapy Evaluation Charge Determination   History Examination Decision-Making   LOW Complexity : Brief history review  MEDIUM Complexity : 3-5 performance deficits relating to physical, cognitive , or psychosocial skils that result in activity limitations and / or participation restrictions MEDIUM Complexity : Patient may present with comorbidities that affect occupational performnce. Miniml to moderate modification of tasks or assistance (eg, physical or verbal ) with assesment(s) is necessary to enable patient to complete evaluation       Based on the above components, the patient evaluation is determined to be of the following complexity level: LOW   Pain:     Activity Tolerance:   Good, with encouragement    Please refer to the flowsheet for vital signs taken during this treatment. After treatment:   [x] Patient left in no apparent distress sitting up in chair  [] Patient left in no apparent distress in bed  [x] Call bell left within reach  [x] Nursing notified  [] Caregiver present  [] Bed alarm activated    COMMUNICATION/EDUCATION:   The patients plan of care was discussed with: Physical Therapist and Registered Nurse. [x] Home safety education was provided and the patient/caregiver indicated understanding. [x] Patient/family have participated as able in goal setting and plan of care.   [x] Patient/family agree to work toward stated goals and plan of care. [] Patient understands intent and goals of therapy, but is neutral about his/her participation. [] Patient is unable to participate in goal setting and plan of care. This patients plan of care is appropriate for delegation to ESTHER.     Thank you for this referral.  Shaniqua Solorzano OT  Time Calculation: 39 mins

## 2018-05-23 ENCOUNTER — APPOINTMENT (OUTPATIENT)
Dept: GENERAL RADIOLOGY | Age: 83
DRG: 280 | End: 2018-05-23
Attending: HOSPITALIST
Payer: MEDICARE

## 2018-05-23 LAB
ANION GAP SERPL CALC-SCNC: 9 MMOL/L (ref 5–15)
BUN SERPL-MCNC: 21 MG/DL (ref 6–20)
BUN/CREAT SERPL: 23 (ref 12–20)
CALCIUM SERPL-MCNC: 8.7 MG/DL (ref 8.5–10.1)
CHLORIDE SERPL-SCNC: 101 MMOL/L (ref 97–108)
CO2 SERPL-SCNC: 25 MMOL/L (ref 21–32)
CREAT SERPL-MCNC: 0.92 MG/DL (ref 0.55–1.02)
GLUCOSE BLD STRIP.AUTO-MCNC: 125 MG/DL (ref 65–100)
GLUCOSE BLD STRIP.AUTO-MCNC: 222 MG/DL (ref 65–100)
GLUCOSE BLD STRIP.AUTO-MCNC: 259 MG/DL (ref 65–100)
GLUCOSE BLD STRIP.AUTO-MCNC: 265 MG/DL (ref 65–100)
GLUCOSE SERPL-MCNC: 146 MG/DL (ref 65–100)
MAGNESIUM SERPL-MCNC: 1.7 MG/DL (ref 1.6–2.4)
PHOSPHATE SERPL-MCNC: 3.3 MG/DL (ref 2.6–4.7)
POTASSIUM SERPL-SCNC: 3.8 MMOL/L (ref 3.5–5.1)
SERVICE CMNT-IMP: ABNORMAL
SODIUM SERPL-SCNC: 135 MMOL/L (ref 136–145)

## 2018-05-23 PROCEDURE — 71046 X-RAY EXAM CHEST 2 VIEWS: CPT

## 2018-05-23 PROCEDURE — 84100 ASSAY OF PHOSPHORUS: CPT | Performed by: HOSPITALIST

## 2018-05-23 PROCEDURE — 82962 GLUCOSE BLOOD TEST: CPT

## 2018-05-23 PROCEDURE — 83735 ASSAY OF MAGNESIUM: CPT | Performed by: HOSPITALIST

## 2018-05-23 PROCEDURE — 74011250636 HC RX REV CODE- 250/636: Performed by: NURSE PRACTITIONER

## 2018-05-23 PROCEDURE — 36415 COLL VENOUS BLD VENIPUNCTURE: CPT | Performed by: HOSPITALIST

## 2018-05-23 PROCEDURE — 97535 SELF CARE MNGMENT TRAINING: CPT

## 2018-05-23 PROCEDURE — 74011250636 HC RX REV CODE- 250/636: Performed by: HOSPITALIST

## 2018-05-23 PROCEDURE — 74011250637 HC RX REV CODE- 250/637: Performed by: NURSE PRACTITIONER

## 2018-05-23 PROCEDURE — 80048 BASIC METABOLIC PNL TOTAL CA: CPT | Performed by: HOSPITALIST

## 2018-05-23 PROCEDURE — 74011250637 HC RX REV CODE- 250/637: Performed by: SPECIALIST

## 2018-05-23 PROCEDURE — 74011636637 HC RX REV CODE- 636/637: Performed by: HOSPITALIST

## 2018-05-23 PROCEDURE — 74011250637 HC RX REV CODE- 250/637: Performed by: HOSPITALIST

## 2018-05-23 PROCEDURE — 65660000000 HC RM CCU STEPDOWN

## 2018-05-23 PROCEDURE — 97116 GAIT TRAINING THERAPY: CPT

## 2018-05-23 RX ORDER — MAGNESIUM SULFATE HEPTAHYDRATE 40 MG/ML
2 INJECTION, SOLUTION INTRAVENOUS ONCE
Status: COMPLETED | OUTPATIENT
Start: 2018-05-23 | End: 2018-05-23

## 2018-05-23 RX ADMIN — INSULIN LISPRO 3 UNITS: 100 INJECTION, SOLUTION INTRAVENOUS; SUBCUTANEOUS at 21:31

## 2018-05-23 RX ADMIN — INSULIN LISPRO 5 UNITS: 100 INJECTION, SOLUTION INTRAVENOUS; SUBCUTANEOUS at 12:04

## 2018-05-23 RX ADMIN — POLYETHYLENE GLYCOL 3350 17 G: 17 POWDER, FOR SOLUTION ORAL at 09:51

## 2018-05-23 RX ADMIN — LEVOTHYROXINE SODIUM 88 MCG: 88 TABLET ORAL at 07:04

## 2018-05-23 RX ADMIN — FOLIC ACID 1 MG: 1 TABLET ORAL at 09:51

## 2018-05-23 RX ADMIN — Medication 1 CAPSULE: at 09:51

## 2018-05-23 RX ADMIN — ANASTROZOLE 1 MG: 1 TABLET, COATED ORAL at 09:58

## 2018-05-23 RX ADMIN — CARVEDILOL 6.25 MG: 6.25 TABLET, FILM COATED ORAL at 09:51

## 2018-05-23 RX ADMIN — SPIRONOLACTONE 25 MG: 25 TABLET, FILM COATED ORAL at 09:50

## 2018-05-23 RX ADMIN — LISINOPRIL 2.5 MG: 5 TABLET ORAL at 09:51

## 2018-05-23 RX ADMIN — Medication 10 ML: at 17:03

## 2018-05-23 RX ADMIN — Medication 10 ML: at 15:20

## 2018-05-23 RX ADMIN — CLOPIDOGREL BISULFATE 75 MG: 75 TABLET ORAL at 09:50

## 2018-05-23 RX ADMIN — Medication 1 TABLET: at 09:52

## 2018-05-23 RX ADMIN — ASPIRIN 81 MG: 81 TABLET, COATED ORAL at 09:51

## 2018-05-23 RX ADMIN — DOCUSATE SODIUM 100 MG: 100 CAPSULE, LIQUID FILLED ORAL at 17:03

## 2018-05-23 RX ADMIN — SITAGLIPTIN 50 MG: 25 TABLET, FILM COATED ORAL at 09:51

## 2018-05-23 RX ADMIN — CARVEDILOL 6.25 MG: 6.25 TABLET, FILM COATED ORAL at 17:03

## 2018-05-23 RX ADMIN — DOCUSATE SODIUM 100 MG: 100 CAPSULE, LIQUID FILLED ORAL at 09:58

## 2018-05-23 RX ADMIN — MAGNESIUM SULFATE HEPTAHYDRATE 2 G: 40 INJECTION, SOLUTION INTRAVENOUS at 15:20

## 2018-05-23 RX ADMIN — INSULIN LISPRO 3 UNITS: 100 INJECTION, SOLUTION INTRAVENOUS; SUBCUTANEOUS at 17:02

## 2018-05-23 RX ADMIN — FUROSEMIDE 20 MG: 20 TABLET ORAL at 09:51

## 2018-05-23 RX ADMIN — GLIMEPIRIDE 1 MG: 1 TABLET ORAL at 07:04

## 2018-05-23 NOTE — PROGRESS NOTES
TRANSFER - OUT REPORT:    Verbal report given to Alka(name) on Jamal Lange  being transferred to (unit) for routine progression of care       Report consisted of patients Situation, Background, Assessment and   Recommendations(SBAR). Information from the following report(s) SBAR, Kardex, Procedure Summary, Intake/Output, MAR, Accordion, Recent Results, Med Rec Status and Cardiac Rhythm NSR was reviewed with the receiving nurse. Lines:   Peripheral IV 05/19/18 Right Antecubital (Active)   Site Assessment Clean, dry, & intact 5/23/2018 11:00 AM   Phlebitis Assessment 0 5/23/2018 11:00 AM   Infiltration Assessment 0 5/23/2018 11:00 AM   Dressing Status Clean, dry, & intact 5/23/2018 11:00 AM   Dressing Type Transparent 5/23/2018 11:00 AM   Hub Color/Line Status Pink;Capped 5/23/2018 11:00 AM   Action Taken Open ports on tubing capped 5/22/2018  8:00 PM   Alcohol Cap Used Yes 5/23/2018 11:00 AM        Opportunity for questions and clarification was provided.       Patient transported with:   Patients medications from home  Registered Nurse

## 2018-05-23 NOTE — PROGRESS NOTES
Kobi Goff MD   Phone/text: [phone] (7am to 7pm)  After 7pm please call  for hospitalist on call    Hospitalist Progress Note     5/23/2018   PCP:  Dr. Julio Cesar Velez MD  Chief Complaint   Patient presents with    Shortness of Breath       Admission Summary:   The patient is an 59-year-old  female with multiple past medical history, including diabetes mellitus, rheumatoid arthritis, metastatic left breast cancer, bilateral pleural effusions, status post thoracocentesis, non-ST elevation myocardial infarction, anemia, hypothyroidism and gastroesophageal reflux disease. She was recently admitted here at [address] [place] [address], and she was in the hospital for a prolonged period of time, from 04/16 and was discharged on 05/11, for NSTEMI and bilateral pleural effusion. The patient was discharged to [facility]. Early this morning, she was again brought from [facility] to the emergency department, because she was found to be short of breath and agitated. Reason For Visit:  NSTEMI, heart failure    Assessment/Plan   Acute on chronic systolic heart failure (POA) - NHYA4 on presentation, breathing much better  - CXR 5/19 with bilateral pleural effusion L>R, diffuse interstitial edema  - Echo 4/26 with mild hypokinesis of the basal-mid anteroseptal and apical septal walls, moderate MR, small pericardial effusion  - Continue lasix IV for diuresis- changed to PO by cards. - Continue carvedilol, lisinopril, spironolactone  - Cardiology consulted    NSTEMI (POA) - s/p cath with severe 3 vessel disease. Not candidate for CABG or stents per cards medical management. - ECG 5/19 with sinus rhythm, old q waves in the anterior leads, old t-wave inversions in the lateral leads.  ST depression no longer present in the inferior leads  - Troponin markedly elevated  - Continue aspirin, carvedilol    Bilateral pleural effusions (POA) - suspect malignant but cytology from thoracentesis 5/5 negative, looks stable compared to 2 weeks ago  - CT chest  with bilateral pleural effusions, pericardial effusion, skeletal mets, left breast mass, left staghorn calculus in the left kidney. CXR PA alteral on  small amounts not drainable. SOB improved. Hypoxia (POA) - due to heart failure, pleural effusions, NSTEMI. Improving  - Supplemental O2    Metastatic left breast cancer ER/IA +, Her-2 - (chronic)  - Continue anastrozole    DM2 with hyperglycemia - recent A1c 7, stable  - Continue januvia, amaryl  - SSI as needed    Hypothyroidism (chronic)  - TSH mildly elevated. Suspect sick euthyroid, will check T3, T4  - Continue synthroid    Anemia (chronic) - stable    RA (chronic) - receives outpatient etanercept    HLD (chronic) - statin allergy, receives outpatient evolocumab  CKD-III: moniotr renal function. See orders for other plans. VTE prophylaxis: enoxaparin  Discussed plan of care with Patient/Family and Nurse   Pre-admission lived at home but was at [facility]  Discharge plan: back to [facility]  Estimated time to discharge: Likely tomorrow to  [facility], with OP cards and onc follow up. Subjective   Ms. Ledesma is feeling fine this morning. She denies chest pain, SOB, palpitations.     Reviewed interval history  Physical examination     Visit Vitals    /65 (BP 1 Location: Right arm)    Pulse 86    Temp 99.5 °F (37.5 °C)    Resp 20    Wt 53.6 kg (118 lb 1.6 oz)    SpO2 95%    Breastfeeding No    BMI 22.31 kg/m2       Temp (24hrs), Av.4 °F (36.9 °C), Min:97.8 °F (36.6 °C), Max:99.5 °F (37.5 °C)      Intake/Output Summary (Last 24 hours) at 18 1747  Last data filed at 18 1100   Gross per 24 hour   Intake              600 ml   Output                0 ml   Net              600 ml       Gen - NAD  HEENT - MMM  Neck - supple, full ROM  CV - RRR, +s4, 2/6 systolic murmur  Resp - lungs decreased at bases with crackles, no wheezing, normal WOB  GI - abdomen S, NT, ND, +BS. No hepatosplenomegaly   - no CVA tenderness, bladder non-palpable in lower abdomen  MSK - normal tone and bulk, no edema  Neuro - A&O, no focal deficits  Psych - calm and cooperative with normal affect    Data Review       Telemetry x   ECG    Xray    CT scan    MRI    Echocardiogram    Ultrasound              I have reviewed the flow sheet and recent notes  New labs and data personally reviewed.     Recent Labs      05/22/18 0446  05/21/18   0442   WBC  5.8  6.1   HGB  9.6*  9.4*   HCT  30.9*  30.5*   PLT  296  296     Recent Labs      05/23/18   0426  05/22/18 0446  05/21/18   0442   NA  135*  137  136   K  3.8  4.1  4.5   CL  101  102  101   CO2  25  25  25   GLU  146*  163*  97   BUN  21*  24*  26*   CREA  0.92  1.04*  1.06*   CA  8.7  8.5  8.9   MG  1.7   --    --    PHOS  3.3   --    --        Medications reviewed  Current Facility-Administered Medications   Medication Dose Route Frequency    evolocumab (REPATHA SURECLICK) pen injection 782 mg (Patient Supplied)  140 mg SubCUTAneous Q 14 DAYS    furosemide (LASIX) tablet 20 mg  20 mg Oral DAILY    b-complex with vitamin c tablet 1 Tab (Patient Supplied)  1 Tab Oral DAILY    heparin (porcine) 1,000 unit/mL injection        0.9% sodium chloride (MBP/ADV) infusion        sodium chloride (NS) flush 5-10 mL  5-10 mL IntraVENous Q8H    sodium chloride (NS) flush 5-10 mL  5-10 mL IntraVENous PRN    clopidogrel (PLAVIX) tablet 75 mg  75 mg Oral DAILY    HYDROcodone-acetaminophen (NORCO) 5-325 mg per tablet 1 Tab  1 Tab Oral Q4H PRN    fentaNYL citrate (PF) injection 25 mcg  25 mcg IntraVENous Q4H PRN    acetaminophen (TYLENOL) tablet 650 mg  650 mg Oral Q4H PRN    glucose chewable tablet 16 g  4 Tab Oral PRN    dextrose (D50W) injection syrg 12.5-25 g  12.5-25 g IntraVENous PRN    glucagon (GLUCAGEN) injection 1 mg  1 mg IntraMUSCular PRN    insulin lispro (HUMALOG) injection   SubCUTAneous AC&HS    anastrozole (ARIMIDEX) tablet 1 mg  1 mg Oral DAILY    carvedilol (COREG) tablet 6.25 mg  6.25 mg Oral BID WITH MEALS    docusate sodium (COLACE) capsule 100 mg  100 mg Oral BID    levothyroxine (SYNTHROID) tablet 88 mcg  88 mcg Oral ACB    lactobac ac& pc-s.therm-b.anim (BERYL Q/RISAQUAD)  1 Cap Oral DAILY    glimepiride (AMARYL) tablet 1 mg  1 mg Oral 7am    lisinopril (PRINIVIL, ZESTRIL) tablet 2.5 mg  2.5 mg Oral DAILY    polyethylene glycol (MIRALAX) packet 17 g  17 g Oral DAILY    spironolactone (ALDACTONE) tablet 25 mg  25 mg Oral DAILY    folic acid (FOLVITE) tablet 1 mg  1 mg Oral DAILY    SITagliptin (JANUVIA) tablet tab 50 mg  50 mg Oral DAILY    aspirin delayed-release tablet 81 mg  81 mg Oral DAILY         Vidal Vines MD  Internal Medicine  5/23/2018

## 2018-05-23 NOTE — PROGRESS NOTES
Cardiology Progress Note            Admit Date: 5/19/2018  Admit Diagnosis: Acute on chronic systolic HF (heart failure) ([facility] [address]  Date: 5/23/2018     Time: 4:10 PM      Subjective:   Sandy Ledesma denies chest pain and SOB. Seen ambulating in hallway with PT. No chest pain. Assessment and Plan     1. NSTEMI, CAD:  S/p [facility] with severe 3 vessel disease  -Not a candidate for surgical intervention. Medical management  -Continue ASA, plavix, BB, Ace-I    -Continue repatha- pt may use dosing from home  -On fishoil.   -Advanced directives:  Currently full code noted on chart. 2. ICM/Acute on chronic systolic CHF:  NYHA class III  -EF 45% on TTE 4/26/18. Mild hypok basial mid anteroseptal and apical septal walls. Mod MR  -Continue ace-I, BB, aldactone  -Lasiix to 20 mg po daily starting in a.m.  -daily weight and I/Os- wt unchanged from yesterday  -CXR:  Interval resolution of pulmonary vascular congestive changes. Persistent retrocardiac opacification, as described above. -No further cardiac testing.     3. Mitral regurgitation:  Moderate on TTE 4/26/2018      4. CKD:   Creatinine improving 0.92      5. Anemia:hgb 9.6, stable.     -On ferrous sulfate    6. HTN- BP controlled  -Coreg 6.25 mg BID  -Lisinopril 2.5 mg daily  -Aldactone 25 mg daily       6. Hx of Colon cancer, now metastatic breast CA   -on arimedex    7. Hx of DM: management per primary team.       Pt with systolic CHF/Cardiomyopathy and severe 3 vessal CAD, NSTEMI- not candidate for CABG. Medical management. [id number] Ellen Faith from cardiology perspective for pt to go back to rehab. Follow up with Dr. Taryn Rainey 1 week after rehab. Will have cardiology nurse navigator follow outpt. Alyssa An. MARIETTA [place] 5/23/2018 4:48 PM   Cardiology Attending:Patient seen and examined. I agree with NP assessment and plans. Walking in green, will sign off.     Herbert Jung MD 5/23/2018 4:31 PM         Objective: Physical Exam:                Visit Vitals    /74 (BP 1 Location: Right arm, BP Patient Position: Sitting)    Pulse 70    Temp 98.4 °F (36.9 °C)    Resp 18    Wt 53.6 kg (118 lb 1.6 oz)    SpO2 97%    Breastfeeding No    BMI 22.31 kg/m2          General Appearance:   Well developed, frail, alert and oriented x 3, and   individual in no acute distress. Ears/Nose/Mouth/Throat:    Hearing grossly normal.         Neck:  Supple. Chest:     diminished bases. No SOB at rest.   Cardiovascular:   Regular rate and rhythm, S1, S2 normal, no murmur. Abdomen:    Soft, non-tender, bowel sounds are active. Extremities:  No edema bilaterally. Skin:  Warm and dry.      Telemetry: normal sinus rhythm          Data Review:    Labs:    Recent Results (from the past 24 hour(s))   GLUCOSE, POC    Collection Time: 05/22/18 12:05 PM   Result Value Ref Range    Glucose (POC) 295 (H) 65 - 100 mg/dL    Performed by Roni Morrell    GLUCOSE, POC    Collection Time: 05/22/18  4:03 PM   Result Value Ref Range    Glucose (POC) 205 (H) 65 - 100 mg/dL    Performed by Roni Morrell    GLUCOSE, POC    Collection Time: 05/22/18  9:41 PM   Result Value Ref Range    Glucose (POC) 253 (H) 65 - 100 mg/dL    Performed by Nina Page    METABOLIC PANEL, BASIC    Collection Time: 05/23/18  4:26 AM   Result Value Ref Range    Sodium 135 (L) 136 - 145 mmol/L    Potassium 3.8 3.5 - 5.1 mmol/L    Chloride 101 97 - 108 mmol/L    CO2 25 21 - 32 mmol/L    Anion gap 9 5 - 15 mmol/L    Glucose 146 (H) 65 - 100 mg/dL    BUN 21 (H) 6 - 20 MG/DL    Creatinine 0.92 0.55 - 1.02 MG/DL    BUN/Creatinine ratio 23 (H) 12 - 20      GFR est AA >60 >60 ml/min/1.73m2    GFR est non-AA 59 (L) >60 ml/min/1.73m2    Calcium 8.7 8.5 - 10.1 MG/DL   MAGNESIUM    Collection Time: 05/23/18  4:26 AM   Result Value Ref Range    Magnesium 1.7 1.6 - 2.4 mg/dL   PHOSPHORUS    Collection Time: 05/23/18  4:26 AM   Result Value Ref Range    Phosphorus 3.3 2.6 - 4.7 MG/DL GLUCOSE, POC    Collection Time: 05/23/18  6:33 AM   Result Value Ref Range    Glucose (POC) 125 (H) 65 - 100 mg/dL    Performed by Claudio Handley           Radiology:        Current Facility-Administered Medications   Medication Dose Route Frequency    evolocumab (REPATHA SURECLICK) pen injection 579 mg (Patient Supplied)  140 mg SubCUTAneous Q 14 DAYS    furosemide (LASIX) tablet 20 mg  20 mg Oral DAILY    b-complex with vitamin c tablet 1 Tab (Patient Supplied)  1 Tab Oral DAILY    heparin (porcine) 1,000 unit/mL injection        0.9% sodium chloride (MBP/ADV) infusion        sodium chloride (NS) flush 5-10 mL  5-10 mL IntraVENous Q8H    sodium chloride (NS) flush 5-10 mL  5-10 mL IntraVENous PRN    clopidogrel (PLAVIX) tablet 75 mg  75 mg Oral DAILY    HYDROcodone-acetaminophen (NORCO) 5-325 mg per tablet 1 Tab  1 Tab Oral Q4H PRN    fentaNYL citrate (PF) injection 25 mcg  25 mcg IntraVENous Q4H PRN    acetaminophen (TYLENOL) tablet 650 mg  650 mg Oral Q4H PRN    glucose chewable tablet 16 g  4 Tab Oral PRN    dextrose (D50W) injection syrg 12.5-25 g  12.5-25 g IntraVENous PRN    glucagon (GLUCAGEN) injection 1 mg  1 mg IntraMUSCular PRN    insulin lispro (HUMALOG) injection   SubCUTAneous AC&HS    anastrozole (ARIMIDEX) tablet 1 mg  1 mg Oral DAILY    carvedilol (COREG) tablet 6.25 mg  6.25 mg Oral BID WITH MEALS    docusate sodium (COLACE) capsule 100 mg  100 mg Oral BID    levothyroxine (SYNTHROID) tablet 88 mcg  88 mcg Oral ACB    lactobac ac& pc-s.therm-b.anim (BERYL Q/RISAQUAD)  1 Cap Oral DAILY    glimepiride (AMARYL) tablet 1 mg  1 mg Oral 7am    lisinopril (PRINIVIL, ZESTRIL) tablet 2.5 mg  2.5 mg Oral DAILY    polyethylene glycol (MIRALAX) packet 17 g  17 g Oral DAILY    spironolactone (ALDACTONE) tablet 25 mg  25 mg Oral DAILY    folic acid (FOLVITE) tablet 1 mg  1 mg Oral DAILY    SITagliptin (JANUVIA) tablet tab 50 mg  50 mg Oral DAILY    aspirin delayed-release tablet 81 mg  81 mg Oral DAILY          Alyssa An.  MARIETTA [place]     [facility] [address] [place] [address] Drive, [address] [place] [address],[address]   [facility]   [phone]

## 2018-05-23 NOTE — PROGRESS NOTES
Problem: Self Care Deficits Care Plan (Adult)  Goal: *Acute Goals and Plan of Care (Insert Text)  Occupational Therapy Goals  Initiated 5/22/2018  1. Patient will perform upper body dressing with independence within 7 day(s). 2.  Patient will perform lower body dressing with minimal assistance/contact guard assist within 7 day(s). 3.  Patient will perform grooming at the sink with supervision/set-up within 7 day(s). 4.  Patient will perform toilet transfers with minimal assistance/contact guard assist within 7 day(s). 5.  Patient will perform all aspects of toileting with minimal assistance/contact guard assist within 7 day(s). 6.  Patient will participate in upper extremity therapeutic exercise/activities with supervision/set-up for 10 minutes within 7 day(s). 7.  Patient will utilize energy conservation techniques during functional activities with verbal cues within 7 day(s). Occupational Therapy TREATMENT  Patient: Jamal Lange (44 y.o. female)  Date: 5/23/2018  Diagnosis: Acute on chronic systolic HF (heart failure) ([facility] [address] <principal problem not specified>       Precautions:    Chart, occupational therapy assessment, plan of care, and goals were reviewed. ASSESSMENT:  Patient received supine in bed, CM requesting updated OT note to initiate d/c to inpatient rehab when medically stable. Patient initially resistant to therapy but agreeable to get up to [facility] and return to bed. Patient requiring Min A x1, pulling up on RW and cues for safety. Patient attempting sit<>stand x2 with Max A x1 and pulling up on RW. Unable to complete despite significant assist. Patient required Mod A x1 from front, pulling up on gait belt, with patient fully flexed forward. Requires significant cues for proper body mechanics. Total A to complete underwear management, supervision for bladder hygiene. Patient returning to supine with Min A x1.  Patient remains significantly below her functional baseline and will benefit from d/c to inpatient rehab when medically stable. Progression toward goals:  []       Improving appropriately and progressing toward goals  [x]       Improving slowly and progressing toward goals  []       Not making progress toward goals and plan of care will be adjusted     PLAN:  Patient continues to benefit from skilled intervention to address the above impairments. Continue treatment per established plan of care. Discharge Recommendations:  Inpatient Rehab  Further Equipment Recommendations for Discharge:  TBD by rehab     SUBJECTIVE:   Patient stated [facility] can wait. It's 3am. Reoriented to 3pm time    OBJECTIVE DATA SUMMARY:   Cognitive/Behavioral Status:  Neurologic State: Alert;Irritable  Orientation Level: Oriented X4  Cognition: Appropriate decision making; Appropriate for age attention/concentration; Follows commands  Perception: Cues to maintain midline in standing;Cues to maintain midline in sitting  Perseveration: No perseveration noted  Safety/Judgement: Fall prevention    Functional Mobility and Transfers for ADLs:  Bed Mobility:  Supine to Sit: Minimum assistance  Sit to Supine: Minimum assistance  Scooting: Stand-by assistance    Transfers:  Sit to Stand: Moderate assistance;Assist x1  Functional Transfers  Toilet Transfer : Moderate assistance;Assist x1;Adaptive equipment (RW and BSC)       Balance:  Sitting: Intact  Standing: Impaired; Without support  Standing - Static: Good  Standing - Dynamic : Fair    ADL Intervention:                           Lower Body Dressing Assistance  Underpants:  Total assistance (dependent)  Position Performed: Standing  Cues: Physical assistance;Don;Doff    Toileting  Bladder Hygiene: Modified independent  Clothing Management: Total assistance (dependent)    Cognitive Retraining  Safety/Judgement: Fall prevention    Neuro Re-Education:           Therapeutic Exercises:   - Patient requiring overall Min A for bed mobility this session; impaired sitting balance, L lateral lean, cues for upright posture  - Patient attempting sit<>stand x2 with Max A x1 and pulling up on RW. Still unable to complete. Mod A x1 from front with fully flexed forward posture and use of RW. Able to take few steps to [facility] placed next to EOB. Requiring cues to bring back LLE and safety with sitting too early    Pain:  Pain Scale 1: Numeric (0 - 10)  Pain Intensity 1: 2  Pain Location 1: Chest  Pain Orientation 1: Left  Pain Description 1: Pressure  Pain Intervention(s) 1: Distraction; Exercise  Activity Tolerance:   Fair. VSS. Lethargic d/t being woken from sleeping. Please refer to the flowsheet for vital signs taken during this treatment.   After treatment:   [] Patient left in no apparent distress sitting up in chair  [x] Patient left in no apparent distress in bed  [x] Call bell left within reach  [x] Nursing notified  [] Caregiver present  [] Bed alarm activated    COMMUNICATION/COLLABORATION:   The patients plan of care was discussed with: Physical Therapist and Registered Nurse    Lyndsey William OT  Time Calculation: 12 mins

## 2018-05-23 NOTE — PROGRESS NOTES
Problem: Mobility Impaired (Adult and Pediatric)  Goal: *Acute Goals and Plan of Care (Insert Text)  Physical Therapy Goals  Initiated 05/22/18    1. Patient will move from supine to sit and sit to supine , scoot up and down and roll side to side in bed with modified independence within 7 day(s). 2.  Patient will transfer from bed to chair and chair to bed with modified independence using the least restrictive device within 7 day(s). 3.  Patient will perform sit to stand with supervision/SBA within 7 day(s). 4.  Patient will ambulate with supervision for 100 feet with the least restrictive device within 7 day(s). physical Therapy TREATMENT  Patient: Ministerio Linares (17 y.o. female)  Date: 5/23/2018  Diagnosis: Acute on chronic systolic HF (heart failure) (HCC) <principal problem not specified>       Precautions:    Chart, physical therapy assessment, plan of care and goals were reviewed. ASSESSMENT:  Chart reviewed, RN cleared patient for mobility, and patient received in bed with daughter present. Patient progressed with all mobility this session, performing bed mobility CGA, then progressing with transfers from [address] A x 1 to Mod I with additional time with proper weight shift over COG and UE usage after 4 repetitions. Patient ambulated community distance, initially with one instance of scissoring requiring Min A x 1 to maintain balance but overall following SBA with step-to pattern progressing to step through. Patient ended session in chair, recommendation from PT to adhere to 1-2 hr schedule of sitting in chair then resting in bed and repeating around all meal times; patient and family agreeable. Discharge rec - rehab vs TBD pending progress with mobility due to endurance and gait deviations increasing risk for falls.   Progression toward goals:  [x]    Improving appropriately and progressing toward goals  []    Improving slowly and progressing toward goals  []    Not making progress toward goals and plan of care will be adjusted     PLAN:  Patient continues to benefit from skilled intervention to address the above impairments. Continue treatment per established plan of care. Discharge Recommendations:  Rehab vs To Be Determined  Further Equipment Recommendations for Discharge:  TBD     SUBJECTIVE:   Patient stated Its because I could understand clear directions and do it myself.     OBJECTIVE DATA SUMMARY:   Critical Behavior:  Neurologic State: Alert  Orientation Level: Oriented X4  Cognition: Follows commands  Safety/Judgement: Awareness of environment, Fall prevention  Functional Mobility Training:  Bed Mobility:     Supine to Sit: Contact guard assistance              Transfers:  Sit to Stand: Modified independent; Additional time (progressed to Mod I from [address] with proper technique)  Stand to Sit: Modified independent                             Balance:     Ambulation/Gait Training:  Distance (ft): 80 Feet (ft)  Assistive Device: Gait belt;Walker, rolling  Ambulation - Level of Assistance: Contact guard assistance (Min A x 1 instance for scissoring)        Gait Abnormalities: Decreased step clearance;Scissoring (one early instance scissoring, resolved)              Speed/Haylee: Pace decreased (<100 feet/min)  Step Length: Right shortened;Left shortened (improved with distance)                    Stairs:              Neuro Re-Education:    Therapeutic Exercises:   Sit to stand x 4 with verbal cueing for technique and weight shift, progressed to Mod I  Pain:  Pain Scale 1: Numeric (0 - 10)  Pain Intensity 1: 2  Pain Location 1: Chest  Pain Orientation 1: Left  Pain Description 1: Pressure  Pain Intervention(s) 1: Distraction; Exercise  Activity Tolerance:   Good, no adverse signs/symptoms reported   Please refer to the flowsheet for vital signs taken during this treatment.   After treatment:   [x]    Patient left in no apparent distress sitting up in chair  []    Patient left in no apparent distress in bed  [x] Call bell left within reach  [x]    Nursing notified  [x]    Caregiver present  []    Bed alarm activated    COMMUNICATION/COLLABORATION:   The patients plan of care was discussed with: Registered Nurse    Tara Sellers PT, DPT   Time Calculation: 17 mins

## 2018-05-23 NOTE — PROGRESS NOTES
2000 - Bedside and Verbal shift change report given to avtar akhtar (oncoming nurse) by Susan Moore (offgoing nurse). Report included the following information SBAR, Kardex, Intake/Output, MAR, Recent Results and Cardiac Rhythm NSR.   0730 - Bedside and Verbal shift change report given to dino (oncoming nurse) by Latrice Goldsmith (offgoing nurse). Report included the following information SBAR, Kardex, Intake/Output, MAR, Recent Results and Cardiac Rhythm NSR.

## 2018-05-23 NOTE — PROGRESS NOTES
Primary Nurse Maggy Escobar RN and Alexis Burgess RN performed a dual skin assessment on this patient Impairment noted- see wound doc flow sheet  Calderon score is 18    Open area on buttocks and incision in right groin from procedure

## 2018-05-23 NOTE — DIABETES MGMT
DTC Progress Note    Recommendations/ Comments: Chart reviewed due to hyperglycemia. FBG in range; post prandial BG's >200 mg/dL. She has received 16 units of correction insulin in the past 24 hours. If appropriate, please consider   Increasing Glimepiride to 2 mg daily  Adding carbohydrate consistent restriction to current diet order    Current hospital DM medication: Amaryl 1 mg, Januvia 50 mg and correction scale Humalog, normal sensitivity. Chart reviewed on Sandy Ledesma. Patient is a 82 y.o. female with known diabetes on Amaryl 1 mg and Januvia 25 mg at home. A1c:   Lab Results   Component Value Date/Time    Hemoglobin A1c 7.0 (H) 04/18/2018 08:14 AM    Hemoglobin A1c 6.9 (H) 11/30/2011 08:30 AM       Recent Glucose Results:   Lab Results   Component Value Date/Time     (H) 05/23/2018 04:26 AM    GLUCPOC 259 (H) 05/23/2018 11:57 AM    GLUCPOC 125 (H) 05/23/2018 06:33 AM    GLUCPOC 253 (H) 05/22/2018 09:41 PM        Lab Results   Component Value Date/Time    Creatinine 0.92 05/23/2018 04:26 AM     Estimated Creatinine Clearance: 36.2 mL/min (based on Cr of 0.92). Active Orders   Diet    DIET CARDIAC Regular        PO intake: If appropriate, please consider   Increasing Glimepiride to 2 mg daily  adding carb consistent to diet order. Patient Vitals for the past 72 hrs:   % Diet Eaten   05/23/18 0915 50 %   05/21/18 1756 90 %       Will continue to follow as needed.     Thank you  Malena Acharya RD   Pager [phone]

## 2018-05-23 NOTE — PROGRESS NOTES
Problem: Falls - Risk of  Goal: *Absence of Falls  Document Yaron Fall Risk and appropriate interventions in the flowsheet.    Outcome: Progressing Towards Goal  Fall Risk Interventions:  Mobility Interventions: Assess mobility with egress test, Patient to call before getting OOB         Medication Interventions: Patient to call before getting OOB    Elimination Interventions: Call light in reach, Patient to call for help with toileting needs

## 2018-05-23 NOTE — PROGRESS NOTES
Bedside and Verbal shift change report given to Attero. Report included the following information SBAR.

## 2018-05-23 NOTE — CDMP QUERY
The medical record notes a CKD diagnosis. Can you please specify the stage of the CKD as:      => CKD Stage 3 as evidenced in the setting of pt with HTN, CHF, now with GFR in [address] Female of 54 requiring close monitoring of Renal Function labs daily, close monitoring VSS, including BP, cardiac enzymes,  ? Other Explanation of clinical findings  ? Clinically Undetermined (no explanation for clinical findings)    The medical record reflects the following risk factors, clinical indicators, and treatment    Risk Factors:  CHF, HTN, NSTEMI  Clinical Indicators:  GFR ranging @ 52  Treatment:  close monitoring VSS, including BP, cardiac enzymes,      Reference:    CKD Stages / [employer]  Stage 1:  GFR = > 90 ml/min  Stage 2:  GFR = 60 to 89  Stage 3:  GFR = 30 to 59  Stage 4:  GFR = 15 to 29  Stage 5:  GFR = < 15    Please clarify and document your clinical opinion in the progress notes and discharge summary including the definitive and/or presumptive diagnosis, (suspected or probable), related to the above clinical findings. Please include clinical findings supporting your diagnosis.       Thank you,  FREDO CastilloN, RN, [address]  [phone]

## 2018-05-24 LAB
ANION GAP SERPL CALC-SCNC: 10 MMOL/L (ref 5–15)
BUN SERPL-MCNC: 23 MG/DL (ref 6–20)
BUN/CREAT SERPL: 21 (ref 12–20)
CALCIUM SERPL-MCNC: 8.4 MG/DL (ref 8.5–10.1)
CHLORIDE SERPL-SCNC: 101 MMOL/L (ref 97–108)
CO2 SERPL-SCNC: 25 MMOL/L (ref 21–32)
CREAT SERPL-MCNC: 1.11 MG/DL (ref 0.55–1.02)
GLUCOSE BLD STRIP.AUTO-MCNC: 168 MG/DL (ref 65–100)
GLUCOSE BLD STRIP.AUTO-MCNC: 184 MG/DL (ref 65–100)
GLUCOSE BLD STRIP.AUTO-MCNC: 261 MG/DL (ref 65–100)
GLUCOSE BLD STRIP.AUTO-MCNC: 308 MG/DL (ref 65–100)
GLUCOSE SERPL-MCNC: 230 MG/DL (ref 65–100)
MAGNESIUM SERPL-MCNC: 1.9 MG/DL (ref 1.6–2.4)
POTASSIUM SERPL-SCNC: 4 MMOL/L (ref 3.5–5.1)
SERVICE CMNT-IMP: ABNORMAL
SODIUM SERPL-SCNC: 136 MMOL/L (ref 136–145)

## 2018-05-24 PROCEDURE — 74011250637 HC RX REV CODE- 250/637: Performed by: NURSE PRACTITIONER

## 2018-05-24 PROCEDURE — 65660000000 HC RM CCU STEPDOWN

## 2018-05-24 PROCEDURE — 36415 COLL VENOUS BLD VENIPUNCTURE: CPT | Performed by: HOSPITALIST

## 2018-05-24 PROCEDURE — 74011636637 HC RX REV CODE- 636/637: Performed by: HOSPITALIST

## 2018-05-24 PROCEDURE — 97530 THERAPEUTIC ACTIVITIES: CPT

## 2018-05-24 PROCEDURE — 83735 ASSAY OF MAGNESIUM: CPT | Performed by: HOSPITALIST

## 2018-05-24 PROCEDURE — 80048 BASIC METABOLIC PNL TOTAL CA: CPT | Performed by: HOSPITALIST

## 2018-05-24 PROCEDURE — 82962 GLUCOSE BLOOD TEST: CPT

## 2018-05-24 PROCEDURE — 74011250637 HC RX REV CODE- 250/637: Performed by: SPECIALIST

## 2018-05-24 PROCEDURE — 74011250636 HC RX REV CODE- 250/636: Performed by: HOSPITALIST

## 2018-05-24 PROCEDURE — 97116 GAIT TRAINING THERAPY: CPT

## 2018-05-24 PROCEDURE — 74011250637 HC RX REV CODE- 250/637: Performed by: HOSPITALIST

## 2018-05-24 RX ADMIN — CARVEDILOL 6.25 MG: 6.25 TABLET, FILM COATED ORAL at 17:10

## 2018-05-24 RX ADMIN — FOLIC ACID 1 MG: 1 TABLET ORAL at 08:31

## 2018-05-24 RX ADMIN — GLIMEPIRIDE 1 MG: 1 TABLET ORAL at 07:04

## 2018-05-24 RX ADMIN — Medication 10 ML: at 22:26

## 2018-05-24 RX ADMIN — SPIRONOLACTONE 25 MG: 25 TABLET, FILM COATED ORAL at 08:53

## 2018-05-24 RX ADMIN — FUROSEMIDE 20 MG: 20 TABLET ORAL at 08:53

## 2018-05-24 RX ADMIN — ANASTROZOLE 1 MG: 1 TABLET, COATED ORAL at 08:29

## 2018-05-24 RX ADMIN — CARVEDILOL 6.25 MG: 6.25 TABLET, FILM COATED ORAL at 07:05

## 2018-05-24 RX ADMIN — CLOPIDOGREL BISULFATE 75 MG: 75 TABLET ORAL at 08:31

## 2018-05-24 RX ADMIN — Medication 1 CAPSULE: at 08:30

## 2018-05-24 RX ADMIN — LEVOTHYROXINE SODIUM 88 MCG: 88 TABLET ORAL at 07:04

## 2018-05-24 RX ADMIN — INSULIN LISPRO 4 UNITS: 100 INJECTION, SOLUTION INTRAVENOUS; SUBCUTANEOUS at 22:26

## 2018-05-24 RX ADMIN — INSULIN LISPRO 2 UNITS: 100 INJECTION, SOLUTION INTRAVENOUS; SUBCUTANEOUS at 17:09

## 2018-05-24 RX ADMIN — INSULIN LISPRO 5 UNITS: 100 INJECTION, SOLUTION INTRAVENOUS; SUBCUTANEOUS at 12:01

## 2018-05-24 RX ADMIN — Medication 10 ML: at 14:00

## 2018-05-24 RX ADMIN — INSULIN LISPRO 2 UNITS: 100 INJECTION, SOLUTION INTRAVENOUS; SUBCUTANEOUS at 07:05

## 2018-05-24 RX ADMIN — ASPIRIN 81 MG: 81 TABLET, COATED ORAL at 08:32

## 2018-05-24 RX ADMIN — POLYETHYLENE GLYCOL 3350 17 G: 17 POWDER, FOR SOLUTION ORAL at 08:31

## 2018-05-24 RX ADMIN — DOCUSATE SODIUM 100 MG: 100 CAPSULE, LIQUID FILLED ORAL at 17:10

## 2018-05-24 RX ADMIN — SITAGLIPTIN 50 MG: 25 TABLET, FILM COATED ORAL at 08:30

## 2018-05-24 NOTE — DIABETES MGMT
DTC Progress Note    Recommendations/ Comments: Chart reviewed due to hyperglycemia. FBG in range; post prandial BG's >200 mg/dL. She has received 18 units of correction insulin in the past 24 hours. Noted carb consistent added to current diet order. If appropriate, please consider   Increasing Glimepiride to 2 mg daily    Current hospital DM medication: Amaryl 1 mg, Januvia 50 mg and correction scale Humalog, normal sensitivity. Chart reviewed on Sandy Ledesma. Patient is a 82 y.o. female with known diabetes on Amaryl 1 mg and Januvia 25 mg at home. A1c:   Lab Results   Component Value Date/Time    Hemoglobin A1c 7.0 (H) 04/18/2018 08:14 AM    Hemoglobin A1c 6.9 (H) 11/30/2011 08:30 AM       Recent Glucose Results:   Lab Results   Component Value Date/Time     (H) 05/24/2018 02:03 AM    GLUCPOC 261 (H) 05/24/2018 11:12 AM    GLUCPOC 184 (H) 05/24/2018 06:57 AM    GLUCPOC 265 (H) 05/23/2018 09:12 PM        Lab Results   Component Value Date/Time    Creatinine 1.11 (H) 05/24/2018 02:03 AM     Estimated Creatinine Clearance: 30 mL/min (based on Cr of 1.11). Active Orders   Diet    DIET CARDIAC Regular; Consistent Carb 2000kcal        Patient Vitals for the past 72 hrs:   % Diet Eaten   05/23/18 0915 50 %   05/21/18 1756 90 %       Will continue to follow as needed.     Thank you  Larry Da Silva, RD, CDE

## 2018-05-24 NOTE — PROGRESS NOTES
Morgan Valencia MD   Phone/text: [phone] (7am to 7pm)  After 7pm please call  for hospitalist on call    Hospitalist Progress Note     5/24/2018   PCP:  Dr. Joseluis Ni MD  Chief Complaint   Patient presents with    Shortness of Breath       Admission Summary:   The patient is an 42-year-old  female with multiple past medical history, including diabetes mellitus, rheumatoid arthritis, metastatic left breast cancer, bilateral pleural effusions, status post thoracocentesis, non-ST elevation myocardial infarction, anemia, hypothyroidism and gastroesophageal reflux disease. She was recently admitted here at [facility], and she was in the hospital for a prolonged period of time, from 04/16 and was discharged on 05/11, for NSTEMI and bilateral pleural effusion. The patient was discharged to [facility]. Early this morning, she was again brought from [facility] to the emergency department, because she was found to be short of breath and agitated. Reason For Visit:  NSTEMI, heart failure. She still feels weak but denies having any chest pain. Assessment/Plan   Acute on chronic systolic heart failure (POA) - NHYA4 on presentation, breathing much better  - CXR 5/19 with bilateral pleural effusion L>R, diffuse interstitial edema  - Echo 4/26 with mild hypokinesis of the basal-mid anteroseptal and apical septal walls, moderate MR, small pericardial effusion  - Continue lasix IV for diuresis- changed to PO by cards. - Continue carvedilol, lisinopril, spironolactone monitor BP closely. - Cardiology consulted    NSTEMI (POA) - s/p cath with severe 3 vessel disease. Not candidate for CABG or stents per cards medical management. - ECG 5/19 with sinus rhythm, old q waves in the anterior leads, old t-wave inversions in the lateral leads.  ST depression no longer present in the inferior leads  - Troponin markedly elevated  - Continue aspirin, carvedilol    Bilateral pleural effusions (POA) - suspect malignant but cytology from thoracentesis  negative, looks stable compared to 2 weeks ago  - CT chest  with bilateral pleural effusions, pericardial effusion, skeletal mets, left breast mass, left staghorn calculus in the left kidney. CXR PA alteral on  small amounts not drainable. SOB improved. Hypoxia (POA) - due to heart failure, pleural effusions, NSTEMI. Improving  - Supplemental O2    Metastatic left breast cancer ER/CA +, Her-2 - (chronic)  - Continue anastrozole    DM2 with hyperglycemia - recent A1c 7, stable  - Continue januvia, amaryl  - SSI as needed    Hypothyroidism (chronic)  - TSH mildly elevated. Suspect sick euthyroid, will check T3, T4  - Continue synthroid    Anemia (chronic) - stable    RA (chronic) - receives outpatient etanercept    HLD (chronic) - statin allergy, receives outpatient evolocumab  CKD-III: moniotr renal function. See orders for other plans. VTE prophylaxis: enoxaparin  Discussed plan of care with Patient/Family and Nurse   Pre-admission lived at home but was at [facility]  Discharge plan: back to [facility]  Estimated time to discharge: Likely tomorrow to [address], with OP cards and onc follow up. Subjective   Ms. Ledesma is feeling fine this morning. She denies chest pain, SOB, palpitations.     Reviewed interval history  Physical examination     Visit Vitals    /58 (BP 1 Location: Right arm, BP Patient Position: At rest)    Pulse 76    Temp 98.1 °F (36.7 °C)    Resp 18    Wt 53.6 kg (118 lb 1.6 oz)    SpO2 96%    Breastfeeding No    BMI 22.31 kg/m2       Temp (24hrs), Av.7 °F (37.1 °C), Min:98.1 °F (36.7 °C), Max:99.5 °F (37.5 °C)      Intake/Output Summary (Last 24 hours) at 18 0850  Last data filed at 18 1555   Gross per 24 hour   Intake              540 ml   Output                0 ml   Net              540 ml       Gen - NAD  HEENT - MMM  Neck - supple, full ROM  CV - RRR, +s4, 2/6 systolic murmur  Resp - lungs decreased at bases with crackles, no wheezing, normal WOB  GI - abdomen S, NT, ND, +BS. No hepatosplenomegaly   - no CVA tenderness, bladder non-palpable in lower abdomen  MSK - normal tone and bulk, no edema  Neuro - A&O, no focal deficits  Psych - calm and cooperative with normal affect    Data Review       Telemetry x   ECG    Xray    CT scan    MRI    Echocardiogram    Ultrasound              I have reviewed the flow sheet and recent notes  New labs and data personally reviewed.     Recent Labs      05/22/18   0446   WBC  5.8   HGB  9.6*   HCT  30.9*   PLT  296     Recent Labs      05/24/18   0203  05/23/18   0426  05/22/18   0446   NA  136  135*  137   K  4.0  3.8  4.1   CL  101  101  102   CO2  25  25  25   GLU  230*  146*  163*   BUN  23*  21*  24*   CREA  1.11*  0.92  1.04*   CA  8.4*  8.7  8.5   MG  1.9  1.7   --    PHOS   --   3.3   --        Medications reviewed  Current Facility-Administered Medications   Medication Dose Route Frequency    evolocumab (REPATHA SURECLICK) pen injection 783 mg (Patient Supplied)  140 mg SubCUTAneous Q 14 DAYS    furosemide (LASIX) tablet 20 mg  20 mg Oral DAILY    b-complex with vitamin c tablet 1 Tab (Patient Supplied)  1 Tab Oral DAILY    heparin (porcine) 1,000 unit/mL injection        0.9% sodium chloride (MBP/ADV) infusion        sodium chloride (NS) flush 5-10 mL  5-10 mL IntraVENous Q8H    sodium chloride (NS) flush 5-10 mL  5-10 mL IntraVENous PRN    clopidogrel (PLAVIX) tablet 75 mg  75 mg Oral DAILY    HYDROcodone-acetaminophen (NORCO) 5-325 mg per tablet 1 Tab  1 Tab Oral Q4H PRN    fentaNYL citrate (PF) injection 25 mcg  25 mcg IntraVENous Q4H PRN    acetaminophen (TYLENOL) tablet 650 mg  650 mg Oral Q4H PRN    glucose chewable tablet 16 g  4 Tab Oral PRN    dextrose (D50W) injection syrg 12.5-25 g  12.5-25 g IntraVENous PRN    glucagon (GLUCAGEN) injection 1 mg  1 mg IntraMUSCular PRN    insulin lispro (HUMALOG) injection SubCUTAneous AC&HS    anastrozole (ARIMIDEX) tablet 1 mg  1 mg Oral DAILY    carvedilol (COREG) tablet 6.25 mg  6.25 mg Oral BID WITH MEALS    docusate sodium (COLACE) capsule 100 mg  100 mg Oral BID    levothyroxine (SYNTHROID) tablet 88 mcg  88 mcg Oral ACB    lactobac ac& pc-s.therm-b.anim (BERYL Q/RISAQUAD)  1 Cap Oral DAILY    glimepiride (AMARYL) tablet 1 mg  1 mg Oral 7am    lisinopril (PRINIVIL, ZESTRIL) tablet 2.5 mg  2.5 mg Oral DAILY    polyethylene glycol (MIRALAX) packet 17 g  17 g Oral DAILY    spironolactone (ALDACTONE) tablet 25 mg  25 mg Oral DAILY    folic acid (FOLVITE) tablet 1 mg  1 mg Oral DAILY    SITagliptin (JANUVIA) tablet tab 50 mg  50 mg Oral DAILY    aspirin delayed-release tablet 81 mg  81 mg Oral DAILY         Christel Olivera MD  Internal Medicine  5/24/2018

## 2018-05-24 NOTE — PROGRESS NOTES
CM received a call from [facility] with [facility], they have accepted patient back and will start the insurance auth today. CM will continue to follow. Gregg Urrutia MSA, RN, CRM.

## 2018-05-24 NOTE — PROGRESS NOTES
Problem: Mobility Impaired (Adult and Pediatric)  Goal: *Acute Goals and Plan of Care (Insert Text)  Physical Therapy Goals  Initiated 05/22/18    1. Patient will move from supine to sit and sit to supine , scoot up and down and roll side to side in bed with modified independence within 7 day(s). 2.  Patient will transfer from bed to chair and chair to bed with modified independence using the least restrictive device within 7 day(s). 3.  Patient will perform sit to stand with supervision/SBA within 7 day(s). 4.  Patient will ambulate with supervision for 100 feet with the least restrictive device within 7 day(s). physical Therapy TREATMENT  Patient: Richelle Shore (77 y.o. female)  Date: 5/24/2018  Diagnosis: Acute on chronic systolic HF (heart failure) (HCC) <principal problem not specified>       Precautions:    Chart, physical therapy assessment, plan of care and goals were reviewed. ASSESSMENT:  Pt received in bed, agreeable to PT. Pt required Min A at shoulders to achieve upright sitting from supine, verbal cues given to push through LUE to aid w/upright sitting. Pt CGA for sit>stand from bed,required verbal cues for proper hand placement when returning to sit EOB while chair to be placed at bedside. Pt able to transfer to chair w/ CGA and RW, practiced sit<>stand x2 for proper hand placement (Min A for sit<>stand x1). Pt amb total of 50 FT (RW). Pt w/ 2 significant LOB to left which required Mod A for recovery, overall CGA for gait. Pt reported LLE would not move and gave out on her. Pt's son reported that pt was wearing brace at IP rehab prior to current admission. PT to continue to follow for progression of mobility as able and appropriate. Will plan try plan for gait training w/ AFO (L) next session.      Progression toward goals:  [x]    Improving appropriately and progressing toward goals  []    Improving slowly and progressing toward goals  []    Not making progress toward goals and plan of care will be adjusted     PLAN:  Patient continues to benefit from skilled intervention to address the above impairments. Continue treatment per established plan of care. Discharge Recommendations:  Inpatient Rehab   Further Equipment Recommendations for Discharge:  TBD     SUBJECTIVE:   Patient stated Verito Guevara took my walker and didn't bring it back. \"    OBJECTIVE DATA SUMMARY:   Critical Behavior:  Neurologic State: Alert  Orientation Level: Oriented X4  Cognition: Appropriate decision making, Appropriate for age attention/concentration, Appropriate safety awareness, Follows commands  Safety/Judgement: Fall prevention  Functional Mobility Training:  Bed Mobility:     Supine to Sit: Minimum assistance; Additional time;Assist x1  Sit to Supine:  (pt returned to chair at bedside)  Scooting: Supervision        Transfers:  Sit to Stand: Contact guard assistance;Minimum assistance;Assist x1 (sit<>stand x2 from chair for hand placement,Mod A sitx1)  Stand to Sit: Contact guard assistance        Bed to Chair: Minimum assistance;Assist x1 (Cues for anterior flexion)                    Balance:  Sitting: Intact  Standing: Impaired; With support  Standing - Static: Good  Standing - Dynamic : Fair  Ambulation/Gait Training:  Distance (ft): 50 Feet (ft) (x2)  Assistive Device: Gait belt;Walker, rolling  Ambulation - Level of Assistance: Contact guard assistance; Moderate assistance; Adaptive equipment;Assist x1 (Mod A d/t LOB (x2, to left))        Gait Abnormalities: Decreased step clearance; Foot drop; Step to gait ((L) foot drop; pt wore AFO while at IP rehab)        Base of Support: Widened     Speed/Haylee: Pace decreased (<100 feet/min); Shuffled; Slow  Step Length: Left shortened;Right shortened                                 Neuro Re-Education:    Therapeutic Exercises:   LAQ's  Pain:                    Activity Tolerance:   Fair.  Pre-activity HR 83 O2 upper 90's; post-activity HR 87 O2 remained in upper 90's  Please refer to the flowsheet for vital signs taken during this treatment.   After treatment:   [x]    Patient left in no apparent distress sitting up in chair  []    Patient left in no apparent distress in bed  [x]    Call bell left within reach  [x]    Nursing notified  [x]    Pt son present  []    Bed alarm activated    COMMUNICATION/COLLABORATION:   The patients plan of care was discussed with: Registered Nurse    Gurwinder ABBASI Means,PTA   Time Calculation: 24 mins

## 2018-05-24 NOTE — PROGRESS NOTES
Problem: Self Care Deficits Care Plan (Adult)  Goal: *Acute Goals and Plan of Care (Insert Text)  Occupational Therapy Goals  Initiated 5/22/2018  1. Patient will perform upper body dressing with independence within 7 day(s). 2.  Patient will perform lower body dressing with minimal assistance/contact guard assist within 7 day(s). 3.  Patient will perform grooming at the sink with supervision/set-up within 7 day(s). 4.  Patient will perform toilet transfers with minimal assistance/contact guard assist within 7 day(s). 5.  Patient will perform all aspects of toileting with minimal assistance/contact guard assist within 7 day(s). 6.  Patient will participate in upper extremity therapeutic exercise/activities with supervision/set-up for 10 minutes within 7 day(s). 7.  Patient will utilize energy conservation techniques during functional activities with verbal cues within 7 day(s). Occupational Therapy TREATMENT  Patient: Trenton Larios (3 y.o. female)  Date: 5/24/2018  Diagnosis: Acute on chronic systolic HF (heart failure) ([facility] [address] <principal problem not specified>       Precautions:    Chart, occupational therapy assessment, plan of care, and goals were reviewed. ASSESSMENT:  Patient received in supine, awakening to voice, agreeable to therapy. Grooming and ADL tasks completed earlier in the morning, therefore assisted patient up to the chair in preparation for lunch. Requiring Mod A & verbal/tactile cues for hand placement to transition to EOB, increased time to complete transition. Transferred to chair & sit<>stand x2 with Min A, cues to correct hip & knee flexion, patient's hands on therapist's shoulders for support and upright posture. Patient reporting hands feeling weak, provided green foam block, 10 reps completed with each hand. Encouraged OOB activity and BUE exercises throughout the day.  Will continue to benefit from d/c to inpatient rehab to maximize strength & functional independence prior to returning home. Progression toward goals:  [x]       Improving appropriately and progressing toward goals  []       Improving slowly and progressing toward goals  []       Not making progress toward goals and plan of care will be adjusted     PLAN:  Patient continues to benefit from skilled intervention to address the above impairments. Continue treatment per established plan of care. Discharge Recommendations:  Inpatient Rehab  Further Equipment Recommendations for Discharge:  TBD by rehab     SUBJECTIVE:   Patient stated [facility] hands feel so weak.     OBJECTIVE DATA SUMMARY:   Cognitive/Behavioral Status:  Neurologic State: Alert  Orientation Level: Oriented X4  Cognition: Appropriate decision making; Appropriate for age attention/concentration; Appropriate safety awareness; Follows commands             Functional Mobility and Transfers for ADLs:  Bed Mobility:  Supine to Sit: Moderate assistance; Additional time (HOB flat, verbal/tactile cues for hand placement)  Scooting: Supervision    Transfers:  Sit to Stand: Minimum assistance;Assist x1;Additional time (Verbal and tactile cues for knee & hip flexion)  Bed to Chair: Minimum assistance;Assist x1 (Cues for anterior flexion)    Balance:  Sitting: Intact  Standing: Impaired; Without support  Standing - Static: Good  Standing - Dynamic : Fair (Hands on therapist's shoulders)    ADL Intervention:       Grooming  Grooming Assistance:  (Family completed this morning)      Toileting  Toileting Assistance:  (Brief donned, purewick removed, up to [facility] w/ 2 A this AM)       Therapeutic Exercises:   Bed mobility: Mod A, verbal/tactile cues for hand placement with bedrail use  Functional transfers: sit<>stand x3, bed>chair with Min A, verbal cues for flexed posture, able to correct with hands on therapist's shoulders    Pain:      Activity Tolerance:   Good    Please refer to the flowsheet for vital signs taken during this treatment.   After treatment:   [x] Patient left in no apparent distress sitting up in chair  [] Patient left in no apparent distress in bed  [x] Call bell left within reach  [x] Nursing notified  [x] Caregiver present  [x] Chair alarm activated    COMMUNICATION/COLLABORATION:   The patients plan of care was discussed with: Physical Therapist and Registered Nurse    Nicholas Distance, OT  Time Calculation: 31 mins

## 2018-05-24 NOTE — PROGRESS NOTES
was asked to re-check his mom's insurance cards. He has voiced concerns that in the past the hospital had the wrong insurance on facesheet. I made copies of the medicare and va premier cards and I called [address] [place] [address] and asked Nuris Hugo to check on authorization for patient going back to [address] [place] [address]. Amadeo Aquino will come to unit and speak with son who has voiced further concerns and is anxious to hear a decision about his mom going to acute rehab. He also asked that I check his mom's facesheet which I did and  Sam Roger is primary and Medicare advantage is 2ndary. Amadeo Aquino did speak with son and [employer] is still pending regarding discharge. I will ask  covering unit on Friday to follow up with [address] [place] [address].

## 2018-05-25 VITALS
BODY MASS INDEX: 22.7 KG/M2 | DIASTOLIC BLOOD PRESSURE: 55 MMHG | OXYGEN SATURATION: 96 % | TEMPERATURE: 98.6 F | HEART RATE: 83 BPM | WEIGHT: 120.15 LBS | RESPIRATION RATE: 16 BRPM | SYSTOLIC BLOOD PRESSURE: 117 MMHG

## 2018-05-25 LAB
ANION GAP SERPL CALC-SCNC: 8 MMOL/L (ref 5–15)
BUN SERPL-MCNC: 24 MG/DL (ref 6–20)
BUN/CREAT SERPL: 20 (ref 12–20)
CALCIUM SERPL-MCNC: 8.9 MG/DL (ref 8.5–10.1)
CHLORIDE SERPL-SCNC: 101 MMOL/L (ref 97–108)
CO2 SERPL-SCNC: 26 MMOL/L (ref 21–32)
CREAT SERPL-MCNC: 1.19 MG/DL (ref 0.55–1.02)
ERYTHROCYTE [DISTWIDTH] IN BLOOD BY AUTOMATED COUNT: 19.1 % (ref 11.5–14.5)
GLUCOSE BLD STRIP.AUTO-MCNC: 128 MG/DL (ref 65–100)
GLUCOSE BLD STRIP.AUTO-MCNC: 182 MG/DL (ref 65–100)
GLUCOSE BLD STRIP.AUTO-MCNC: 286 MG/DL (ref 65–100)
GLUCOSE SERPL-MCNC: 200 MG/DL (ref 65–100)
HCT VFR BLD AUTO: 29.7 % (ref 35–47)
HGB BLD-MCNC: 9.2 G/DL (ref 11.5–16)
MCH RBC QN AUTO: 27.2 PG (ref 26–34)
MCHC RBC AUTO-ENTMCNC: 31 G/DL (ref 30–36.5)
MCV RBC AUTO: 87.9 FL (ref 80–99)
NRBC # BLD: 0 K/UL (ref 0–0.01)
NRBC BLD-RTO: 0 PER 100 WBC
PLATELET # BLD AUTO: 321 K/UL (ref 150–400)
PMV BLD AUTO: 9.7 FL (ref 8.9–12.9)
POTASSIUM SERPL-SCNC: 4.3 MMOL/L (ref 3.5–5.1)
RBC # BLD AUTO: 3.38 M/UL (ref 3.8–5.2)
SERVICE CMNT-IMP: ABNORMAL
SODIUM SERPL-SCNC: 135 MMOL/L (ref 136–145)
WBC # BLD AUTO: 6 K/UL (ref 3.6–11)

## 2018-05-25 PROCEDURE — 74011250637 HC RX REV CODE- 250/637: Performed by: HOSPITALIST

## 2018-05-25 PROCEDURE — 74011250637 HC RX REV CODE- 250/637: Performed by: NURSE PRACTITIONER

## 2018-05-25 PROCEDURE — 80048 BASIC METABOLIC PNL TOTAL CA: CPT | Performed by: HOSPITALIST

## 2018-05-25 PROCEDURE — 74011636637 HC RX REV CODE- 636/637: Performed by: HOSPITALIST

## 2018-05-25 PROCEDURE — 36415 COLL VENOUS BLD VENIPUNCTURE: CPT | Performed by: HOSPITALIST

## 2018-05-25 PROCEDURE — 82962 GLUCOSE BLOOD TEST: CPT

## 2018-05-25 PROCEDURE — 97535 SELF CARE MNGMENT TRAINING: CPT

## 2018-05-25 PROCEDURE — 85027 COMPLETE CBC AUTOMATED: CPT | Performed by: HOSPITALIST

## 2018-05-25 PROCEDURE — 74011250636 HC RX REV CODE- 250/636: Performed by: HOSPITALIST

## 2018-05-25 PROCEDURE — 74011250637 HC RX REV CODE- 250/637: Performed by: SPECIALIST

## 2018-05-25 RX ORDER — CLOPIDOGREL BISULFATE 75 MG/1
75 TABLET ORAL DAILY
Qty: 60 TAB | Refills: 0 | Status: SHIPPED
Start: 2018-05-25 | End: 2018-11-13 | Stop reason: SDUPTHER

## 2018-05-25 RX ADMIN — FOLIC ACID 1 MG: 1 TABLET ORAL at 08:58

## 2018-05-25 RX ADMIN — CARVEDILOL 6.25 MG: 6.25 TABLET, FILM COATED ORAL at 17:30

## 2018-05-25 RX ADMIN — FUROSEMIDE 20 MG: 20 TABLET ORAL at 08:57

## 2018-05-25 RX ADMIN — DOCUSATE SODIUM 100 MG: 100 CAPSULE, LIQUID FILLED ORAL at 17:30

## 2018-05-25 RX ADMIN — POLYETHYLENE GLYCOL 3350 17 G: 17 POWDER, FOR SOLUTION ORAL at 09:00

## 2018-05-25 RX ADMIN — LEVOTHYROXINE SODIUM 88 MCG: 88 TABLET ORAL at 07:07

## 2018-05-25 RX ADMIN — ANASTROZOLE 1 MG: 1 TABLET, COATED ORAL at 08:58

## 2018-05-25 RX ADMIN — Medication 1 CAPSULE: at 08:57

## 2018-05-25 RX ADMIN — INSULIN LISPRO 2 UNITS: 100 INJECTION, SOLUTION INTRAVENOUS; SUBCUTANEOUS at 17:30

## 2018-05-25 RX ADMIN — Medication 1 TABLET: at 10:11

## 2018-05-25 RX ADMIN — LISINOPRIL 2.5 MG: 5 TABLET ORAL at 08:56

## 2018-05-25 RX ADMIN — CLOPIDOGREL BISULFATE 75 MG: 75 TABLET ORAL at 08:57

## 2018-05-25 RX ADMIN — INSULIN LISPRO 5 UNITS: 100 INJECTION, SOLUTION INTRAVENOUS; SUBCUTANEOUS at 11:58

## 2018-05-25 RX ADMIN — SITAGLIPTIN 50 MG: 25 TABLET, FILM COATED ORAL at 08:55

## 2018-05-25 RX ADMIN — ASPIRIN 81 MG: 81 TABLET, COATED ORAL at 08:58

## 2018-05-25 RX ADMIN — DOCUSATE SODIUM 100 MG: 100 CAPSULE, LIQUID FILLED ORAL at 08:57

## 2018-05-25 RX ADMIN — Medication 10 ML: at 07:07

## 2018-05-25 RX ADMIN — SPIRONOLACTONE 25 MG: 25 TABLET, FILM COATED ORAL at 08:57

## 2018-05-25 RX ADMIN — GLIMEPIRIDE 1 MG: 1 TABLET ORAL at 07:07

## 2018-05-25 RX ADMIN — CARVEDILOL 6.25 MG: 6.25 TABLET, FILM COATED ORAL at 07:07

## 2018-05-25 NOTE — PROGRESS NOTES
Problem: Self Care Deficits Care Plan (Adult)  Goal: *Acute Goals and Plan of Care (Insert Text)  Occupational Therapy Goals  Initiated 5/22/2018  1. Patient will perform upper body dressing with independence within 7 day(s). 2.  Patient will perform lower body dressing with minimal assistance/contact guard assist within 7 day(s). 3.  Patient will perform grooming at the sink with supervision/set-up within 7 day(s). 4.  Patient will perform toilet transfers with minimal assistance/contact guard assist within 7 day(s). 5.  Patient will perform all aspects of toileting with minimal assistance/contact guard assist within 7 day(s). 6.  Patient will participate in upper extremity therapeutic exercise/activities with supervision/set-up for 10 minutes within 7 day(s). 7.  Patient will utilize energy conservation techniques during functional activities with verbal cues within 7 day(s). Occupational Therapy TREATMENT  Patient: Lyndsay Mead (87 y.o. female)  Date: 5/25/2018  Diagnosis: Acute on chronic systolic HF (heart failure) ([facility] [address] <principal problem not specified>       Precautions:    Chart, occupational therapy assessment, plan of care, and goals were reviewed. ASSESSMENT:  Patient received in bed with her personal aide present. Patient participated in bed mobility, self care and transfers with RW with set up to total assist. Performance impacted by need for mod assist overall for sit to stand. Patient and aide instructed in positioning to move sit to stand. Aide reports she has patient hold her on her shoulders normally to move to stand. Instructed in use of gait belt for assisting patient to walk to [facility] in room and perform transfers to increase safety and prevent injury to aide or patient. Issued hospital gait belt to use while here as she states she has one from [facility] and plan is to return there.  Patient is alert, follows 100% of commands, and is able to translate information to aide as patient reports that her [employer] is limited. Patient reports she had aides at baseline, but was able to toilet and spend evening getting ready for bed and going to bed on her own. Recommend Inpatient rehab. Progression toward goals:  [x]       Improving appropriately and progressing toward goals  []       Improving slowly and progressing toward goals  []       Not making progress toward goals and plan of care will be adjusted     PLAN:  Patient continues to benefit from skilled intervention to address the above impairments. Continue treatment per established plan of care. Discharge Recommendations:  Inpatient Rehab  Further Equipment Recommendations for Discharge:  nojne     SUBJECTIVE:   Patient stated Lesly Lane went to bed on my own.     OBJECTIVE DATA SUMMARY:   Cognitive/Behavioral Status:  Neurologic State: Alert  Orientation Level: Oriented X4  Cognition: Appropriate for age attention/concentration; Appropriate safety awareness; Follows commands  Perception: Appears intact  Perseveration: No perseveration noted  Safety/Judgement: Awareness of environment    Functional Mobility and Transfers for ADLs:  Bed Mobility:  Supine to Sit: Minimum assistance;Assist x1    Transfers:  Sit to Stand: Moderate assistance;Assist x1  Functional Transfers  Toilet Transfer : Moderate assistance;Assist x1  Cues: Physical assistance;Verbal cues provided  Adaptive Equipment: Walker (comment)  Bed to Chair: Assist x1;Minimum assistance    Balance:  Sitting: Intact  Standing: Impaired  Standing - Static: Fair  Standing - Dynamic : Fair    ADL Intervention:       Grooming  Grooming Assistance:  (seated)  Washing Face: Supervision/set-up  Washing Hands: Supervision/set-up    Upper Body Bathing  Bathing Assistance: Supervision/set-up (demonstrates mobility do perform, but states her aide typically does it)  Position Performed: Seated in chair  Cues: Verbal cues provided              Lower Body Dressing Assistance  Socks:  Total assistance (dependent)  Slip on Shoes with Back: Total assistance (dependent)  Leg Crossed Method Used: No  Position Performed: Bending forward method;Standing  Adaptive Equipment Used: Walker         Cognitive Retraining  Safety/Judgement: Awareness of environment          Activity Tolerance:   Fair  Please refer to the flowsheet for vital signs taken during this treatment.   After treatment:   [x] Patient left in no apparent distress sitting up in chair  [] Patient left in no apparent distress in bed  [x] Call bell left within reach  [x] Nursing notified  [x] Caregiver present(aide)  [] Bed alarm activated    COMMUNICATION/COLLABORATION:   The patients plan of care was discussed with: Registered Nurse    ENEDELIA Serrano  Time Calculation: 37 mins

## 2018-05-25 NOTE — DISCHARGE SUMMARY
Discharge Summary       PATIENT ID: Samira Orozco  MRN: [id number]   YOB: 1935    DATE OF ADMISSION: 5/19/2018  2:01 AM    DATE OF DISCHARGE: 05/25/18   PRIMARY CARE PROVIDER: Margarita Dominguez MD     ADMITTING PHYSICIAN: Keyon Rothman MD   DISCHARGING PROVIDER: Foster Bustamante MD    To contact this individual call [phone] and ask the  to page. If unavailable ask to be transferred the Adult Hospitalist Department. CONSULTATIONS: IP CONSULT TO HOSPITALIST  IP CONSULT TO CARDIOLOGY    PROCEDURES/SURGERIES: * No surgery found *    ADMISSION HISTORY (copied from admitting provider's H&P): The patient is an 21-year-old [place] female with multiple past medical history, including diabetes mellitus, rheumatoid arthritis, metastatic left breast cancer, bilateral pleural effusions, status post thoracocentesis, non-ST elevation myocardial infarction, anemia, hypothyroidism and gastroesophageal reflux disease. She was recently admitted here at [facility], and she was in the hospital for a prolonged period of time, from 04/16 and was discharged on 05/11, for NSTEMI and bilateral pleural effusion. The patient was discharged to [facility]. Early this morning, she was again brought from [facility] to the emergency department, because she was found to be short of breath and agitated. The rest of the history could not be obtained. EMS found the patient to be tachycardic and diaphoretic. In the emergency department, she had a chest x-ray done, which showed cardiomegaly with congestive heart failure, interstitial pulmonary edema and bilateral pleural effusion.   She subsequently had a CT scan of the chest done without contrast, which showed bilateral pleural effusion, with overlying atelectasis, pericardial effusion and coronary artery disease, diffuse sclerotic and some lucent skeletal metastasis, left breast mass on the left breast skin thickening concerning for breast neoplasm, nonobstructing left early systolic staghorn calculus. Initial troponin drawn was 1.55. The patient was administered 80 mg of IV Lasix in the ER, and she was also given 5 units of regular insulin for hyperglycemia. We have been consulted for hospital admission and for evaluation. HOSPITAL COURSE:   1. Acute on chronic systolic heart failure (POA) - cardiology was consulted. EF 45% on TTE 4/26/18. Continued on GDMT. Diuresed w/ IV lasix with improvement. Room air and stable at discharge. 2. NSTEMI / CAD - s/p cath w/ severe 3 vessel disease. Not candidate for CABG or stents per cardiology. Medical management. Follow up with cardiologist Dr. Jack Hernandez 1 week after SNF discharge. 3. Bilateral pleural effusions (POA) - possible related to CHF and/or malignant. Cytology from thoracentesis 5/5 was negative but still may be malignant. CXR 5/23 small amount not drainable. 4. Acute hypoxic respiratory failure -- due to CHF, pleural effusion, NSTEMI. S/p IV lasix w/ improvement. On room air at discharge. 5. Metastatic left breast cancer [place] +, Her-2 - (chronic) - Continue anastrozole, f/u with oncology as outpatient as scheduled. 6. DM2 with hyperglycemia - recent A1c 7, stable. Continue meds. 7. Hypothyroidism (chronic) - continue meds  8. Anemia (chronic) - stable  9. RA (chronic) - receives outpatient etanercept  10. HLD (chronic) - statin allergy, receives outpatient evolocumab  11. CKD-III: stable  12. HTN - BP meds      DISCHARGE DIAGNOSES / PLAN:      Discharged to [facility] SN[facility]. 1. Acute on chronic systolic heart failure / ICM (POA) - cardiology was consulted. EF 45% on TTE 4/26/18. Continued on GDMT. Diuresed w/ IV lasix with improvement. Room air and stable at discharge. 2. NSTEMI / CAD - s/p left heart cath on 5/21/18 w/ severe diffuse 3 vessel disease. Not candidate for CABG or stents per cardiology. Medical management. Plavix was added to prior regimen.   Follow up with cardiologist Dr. Mata Lima 1 week after SNF discharge. 3. Bilateral pleural effusions (POA) - possible related to CHF and/or malignant. Cytology from thoracentesis 5/5 was negative but still may be malignant. CXR 5/23 small amount not drainable. 4. Acute hypoxic respiratory failure -- due to CHF, pleural effusion, NSTEMI. S/p IV lasix w/ improvement. On room air at discharge. 5. Metastatic left breast cancer ER/[place] +, Her-2 - (chronic) - Continue anastrozole, f/u with oncology as outpatient as scheduled. 6. DM2 with hyperglycemia - recent A1c 7, stable. Continue meds. 7. Hypothyroidism (chronic) - continue meds  8. Anemia (chronic) - stable  9. RA (chronic) - receives outpatient etanercept  10. HLD (chronic) - statin allergy, receives outpatient evolocumab  11. CKD-III: stable  12. HTN - BP meds       PENDING TEST RESULTS:   At the time of discharge the following test results are still pending: none    FOLLOW UP APPOINTMENTS:    Follow-up Information     Follow up With Details Comments Fiona Marshall MD Schedule an appointment as soon as possible for a visit Follow up with Dr. Mata Lima 1 week after discharged from [address] [place] [address]  [phone]      Oncologist  Follow up with oncology as scheduled     Travon Middleton MD   [address] [place] [address] [id number] [place] [address]  inpatient rehab [address] [place], [place] [id number] [phone]           ADDITIONAL CARE RECOMMENDATIONS:     DIET: Cardiac Diet  ACTIVITY: Activity as tolerated    WOUND CARE: n/a    EQUIPMENT needed: as PT recommends      DISCHARGE MEDICATIONS:  Current Discharge Medication List      START taking these medications    Details   clopidogrel (PLAVIX) 75 mg tab Take 1 Tab by mouth daily.   Qty: 60 Tab, Refills: 0         CONTINUE these medications which have NOT CHANGED    Details   carvedilol (COREG) 6.25 mg tablet Take 1 Tab by mouth two (2) times daily (with meals). Qty: 90 Tab, Refills: 0      anastrozole (ARIMIDEX) 1 mg tablet Take 1 Tab by mouth daily. Qty: 90 Tab, Refills: 0      docusate sodium (COLACE) 100 mg capsule Take 1 Cap by mouth two (2) times a day for 90 days. Qty: 60 Cap, Refills: 2      furosemide (LASIX) 20 mg tablet Take one pill daily by mouth . Qty: 90 Tab, Refills: 0      L. acidoph & paracasei- S therm- Bifido (BERYL-Q/RISAQUAD) 8 billion cell cap cap Take 1 Cap by mouth daily. Qty: 90 Cap, Refills: 0      spironolactone (ALDACTONE) 25 mg tablet Take 1 Tab by mouth daily. Qty: 90 Tab, Refills: 0      polyethylene glycol (MIRALAX) 17 gram packet Take 1 Packet by mouth daily. Qty: 1 Each, Refills: 0      phosphorus (K PHOS NEUTRAL) 250 mg tablet Take 1 Tab by mouth two (2) times a day. Qty: 90 Tab, Refills: 0      lisinopril (PRINIVIL, ZESTRIL) 2.5 mg tablet Take 1 Tab by mouth daily. Qty: 90 Tab, Refills: 0      epoetin celio (EPOGEN;PROCRIT) 10,000 unit/mL injection 1 mL by SubCUTAneous route every Monday, Wednesday, Friday. Indications: ANEMIA DUE TO RENAL FAILURE  Qty: 1 Vial, Refills: 0      glimepiride (AMARYL) 1 mg tablet Take 1 mg by mouth every morning. levothyroxine (SYNTHROID) 88 mcg tablet Take 88 mcg by mouth Daily (before breakfast). etanercept (ENBREL) 50 mg/mL (0.98 mL) injection 1 mL by SubCUTAneous route every seven (7) days. Qty: 4 Syringe, Refills: 6      evolocumab (REPATHA SURECLICK) pen injection 1 mL by SubCUTAneous route every fourteen (14) days. Qty: 2 Pen, Refills: 11    Associated Diagnoses: Other hyperlipidemia      folic acid (FOLVITE) 1 mg tablet TAKE ONE TABLET BY MOUTH DAILY  Qty: 90 Tab, Refills: 2    Associated Diagnoses: Seropositive rheumatoid arthritis of multiple sites (HCC)      sitaGLIPtin (JANUVIA) 25 mg tablet Take 50 mg by mouth daily. VIT C/VIT E/LUTEIN/MIN/OMEGA-3 (OCUVITE PO) Take 1 Tab by mouth daily. MAGNESIUM MALATE Take 400 mg by mouth daily. MULTIVITAMIN WITH MINERALS (HAIR,SKIN & NAILS PO) Take 1 Tab by mouth daily. aspirin delayed-release 81 mg tablet Take 81 mg by mouth daily. OMEGA-3 FATTY ACIDS/FISH OIL (OMEGA 3 FISH OIL PO) Take 300 mg by mouth daily. ascorbate calcium (MAKAYLA-C) 500 mg Tab Take 1,000 mg by mouth daily. CHOLECALCIFEROL, VITAMIN D3, (VITAMIN D-3 PO) Take 1,000 Units by mouth daily. NOTIFY YOUR PHYSICIAN FOR ANY OF THE FOLLOWING:   Fever over 101 degrees for 24 hours. Chest pain, shortness of breath, fever, chills, nausea, vomiting, diarrhea, change in mentation, falling, weakness, bleeding. Severe pain or pain not relieved by medications. Or, any other signs or symptoms that you may have questions about. DISPOSITION:    Home With:   OT  PT  HH  RN      x Long term SNF/Inpatient Rehab    Independent/assisted living    Hospice    Other:       PATIENT CONDITION AT DISCHARGE:     Functional status    Poor    x Deconditioned     Independent      Cognition   x  Lucid     Forgetful     Dementia      Catheters/lines (plus indication)    Alford     PICC     PEG    x None      Code status     Full code    x DNR      PHYSICAL EXAMINATION AT DISCHARGE: (stable)  /82  Pulse 82  Temp 98.3 °F (36.8 °C)  Resp 16  Wt 54.5 kg (120 lb 2.4 oz)  SpO2 97%  Breastfeeding? No  BMI 22.7 kg/m2  General: lying in bed comfortably, nontoxic, NAD  HEENT: NCAT, MMM, EOMI  CV: RRR, no m/r/g  Lungs: non-labored, CTAB, diminished bases, no wheezes or rales heard  Abdomen: soft, NT/ND, +BS  Extremities: no cyanosis or LE edema  Neuro: A&O. Spont mvt of all extremities with no focal deficits noted.    Psych: appropriate mood and affect  Skin: no rash or skin breakdown seen      CHRONIC MEDICAL DIAGNOSES:  Problem List as of 5/25/2018  Date Reviewed: 4/16/2018          Codes Class Noted - Resolved    Acute on chronic systolic HF (heart [facility]) ([facility] ICD-10-CM: S70.42  ICD-9-CM: 428.23  5/19/2018 - Present Acute systolic heart failure (HCC) ICD-10-CM: I50.21  ICD-9-CM: 428.21  4/24/2018 - Present        Altered mental status, unspecified ICD-10-CM: R41.82  ICD-9-CM: 780.97  4/23/2018 - Present        SOB (shortness of breath) ICD-10-CM: R06.02  ICD-9-CM: 786.05  4/16/2018 - Present        CKD (chronic kidney disease) stage 3, GFR 30-59 ml/min ICD-10-CM: N18.3  ICD-9-CM: 585.3  10/25/2017 - Present        Vitamin D deficiency ICD-10-CM: E55.9  ICD-9-CM: 268.9  6/16/2017 - Present        Asymptomatic hyperuricemia ICD-10-CM: E79.0  ICD-9-CM: 790.6  2/16/2017 - Present        Statin intolerance ICD-10-CM: Z78.9  ICD-9-CM: 995.27  12/21/2016 - Present        Long-term use of immunosuppressant medication ICD-10-CM: Z79.899  ICD-9-CM: V58.69  11/21/2016 - Present        Seropositive rheumatoid arthritis of multiple sites [facility]) ICD-10-CM: M05.79  ICD-9-CM: 714.0  9/28/2016 - Present        Primary osteoarthritis of both knees ICD-10-CM: M17.0  ICD-9-CM: 715.16  9/28/2016 - Present        Occlusion and stenosis of carotid artery without mention of cerebral infarction ICD-10-CM: I65.29  ICD-9-CM: 433.10  8/23/2013 - Present        Weakness due to cerebrovascular accident ICD-10-CM: [id number]  ICD-9-CM: Christianne Leventhal  4/2/2012 - Present        Neuropathy in diabetes [facility]) ICD-10-CM: E11.40  ICD-9-CM: 250.60, 357.2  4/2/2012 - Present        PAD (peripheral artery disease) ([facility] ICD-10-CM: I73.9  ICD-9-CM: 443.9  9/7/2011 - Present        Carotid bruit ICD-10-CM: R09.89  ICD-9-CM: 785.9  8/31/2011 - Present        Claudication ([facility] ICD-10-CM: I73.9  ICD-9-CM: 443.9  8/31/2011 - Present        Weakness of left arm ICD-10-CM: R29.898  ICD-9-CM: 729.89  8/31/2011 - Present        Hyperlipidemia ICD-10-CM: E78.5  ICD-9-CM: 272.4  1/27/2010 - Present        DM (diabetes mellitus) ([facility] ICD-10-CM: E11.9  ICD-9-CM: 250.00  9/2/2009 - Present        Hypothyroid ICD-10-CM: E03.9  ICD-9-CM: 244.9  9/2/2009 - Present        Colon cancer ([facility] ICD-10-CM: C18.9  ICD-9-CM: 153.9  9/2/2009 - Present        H/O: CVA ICD-9-CM: V12.54  9/2/2009 - Present        Microalbuminuria ICD-10-CM: R80.9  ICD-9-CM: 791.0  9/2/2009 - Present        Age-related osteoporosis without current pathological fracture ICD-10-CM: M81.0  ICD-9-CM: 733.01  9/2/2009 - Present        Essential hypertension ICD-10-CM: I10  ICD-9-CM: 401.9  9/2/2009 - Present        Anemia ICD-10-CM: D64.9  ICD-9-CM: 285.9  9/2/2009 - Present              Time spent on discharge: 34 minutes, including personal evaluation of the patient, counseling, preparation of discharge prescriptions/documentation, and coordination of care    Signed:   Martha Gamez MD  5/25/2018  7:52 AM

## 2018-05-25 NOTE — PROGRESS NOTES
Problem: Discharge Planning  Goal: *Discharge to safe environment  Outcome: Resolved/Met Date Met: 05/25/18  Discharge to [facility] today for rehab

## 2018-05-25 NOTE — PROGRESS NOTES
ELISEO [facility] was called and report was given to  [employer]. She was asked to call us back if they have any further question.

## 2018-05-25 NOTE — PROGRESS NOTES
Patient discussed in IDR's today. Patient plan is to be discharged to [facility] for inpatient rehab. Patient has been accepted but cannot be admitted until authorization is secured. CM talked with Vidaserinaanaya Ramos, liaison, [phone], and she will call CM as soon as the insurance company has made a decision. Patient will need ambulance transport.  (patient was discharged to [facility] 5/11/18 after hospitalization--she requires another authorization)     Patient lives with her son, Rowena Martinez [phone], and has a personal care aide 5 days a week for 8 hours a day. Dr Jack Hernandez wants to see patient in his office a week  after patient is discharged from rehab. This is placed on discharge instructions. UPDATE 2 pm  Authorization secured from [employer]. CM talked with son and he is in agreement with patient going today   Referral sent via [employer] to [facility] and transport confirmed for 6 pm.      CM faxed AVS to [phone]. Cm portion of [place] completed. Nurse to call report to [phone]. Envelope and ambulance from on chart. AVS mars and kardex to be added to envelope.

## 2018-05-25 NOTE — DISCHARGE INSTRUCTIONS
Discharge SNF/Rehab Instructions/LTAC       PATIENT ID: Evelyne Ott  MRN: [id number]   YOB: 1935    DATE OF ADMISSION: 5/19/2018  2:01 AM    DATE OF DISCHARGE: 5/25/2018    PRIMARY CARE PROVIDER: Kiara Smart MD       ATTENDING PHYSICIAN: Mendez Navarrete MD  DISCHARGING PROVIDER: Mendez Navarrete MD     To contact this individual call [phone] and ask the  to page. If unavailable ask to be transferred the Adult Hospitalist Department. CONSULTATIONS: IP CONSULT TO HOSPITALIST  IP CONSULT TO CARDIOLOGY    PROCEDURES/SURGERIES: * No surgery found *    ADMITTING DIAGNOSES & HOSPITAL COURSE:   1. Acute on chronic systolic heart failure (POA) - cardiology was consulted. Continued on GDMT. Diuresed w/ IV lasix with improvement. Room air and stable at discharge. 2. NSTEMI - s/p cath w/ severe 3 vessel disease. Not candidate for CABG or stents per cardiology. Medical management. Follow up with cardiologist Dr. Leonid France 1 week after SNF discharge. 3. Bilateral pleural effusions (POA) - possible related to CHF and/or malignant. Cytology from thoracentesis 5/5 was negative but still may be malignant. CXR 5/23 small amount not drainable. 4. Acute hypoxic respiratory failure -- due to CHF, pleural effusion, NSTEMI. S/p IV lasix w/ improvement. On room air at discharge. 5. Metastatic left breast cancer ER/[place] +, Her-2 - (chronic) - Continue anastrozole, f/u with oncology as outpatient as scheduled. 6. DM2 with hyperglycemia - recent A1c 7, stable. Continue meds. 7. Hypothyroidism (chronic) - continue meds  8. Anemia (chronic) - stable  9. RA (chronic) - receives outpatient etanercept  10. HLD (chronic) - statin allergy, receives outpatient evolocumab  11. CKD-III: stable      DISCHARGE DIAGNOSES / PLAN:      1. Acute on chronic systolic heart failure (POA) - cardiology was consulted. Continued on GDMT. Diuresed w/ IV lasix with improvement. Room air and stable at discharge.   2. NSTEMI - s/p cath w/ severe 3 vessel disease. Not candidate for CABG or stents per cardiology. Medical management. Follow up with cardiologist Dr. Luís Snow 1 week after SNF discharge. 3. Bilateral pleural effusions (POA) - possible related to CHF and/or malignant. Cytology from thoracentesis 5/5 was negative but still may be malignant. CXR 5/23 small amount not drainable. 4. Acute hypoxic respiratory failure -- due to CHF, pleural effusion, NSTEMI. S/p IV lasix w/ improvement. On room air at discharge. 5. Metastatic left breast cancer [place] +, Her-2 - (chronic) - Continue anastrozole, f/u with oncology as outpatient as scheduled. 6. DM2 with hyperglycemia - recent A1c 7, stable. Continue meds. 7. Hypothyroidism (chronic) - continue meds  8. Anemia (chronic) - stable  9. RA (chronic) - receives outpatient etanercept  10. HLD (chronic) - statin allergy, receives outpatient evolocumab  11. CKD-III: stable       PENDING TEST RESULTS:   At the time of discharge the following test results are still pending: none    FOLLOW UP APPOINTMENTS:    Follow-up Information     Follow up With Details Comments Joselito Zarco MD Schedule an appointment as soon as possible for a visit Follow up with Dr. Luís Snow 1 week after discharged from [address] [place], [place] [address]  [phone]      Oncologist  Follow up with oncology as scheduled     Charlene Angel MD   [facility] [address]  [phone]             ADDITIONAL CARE RECOMMENDATIONS: n/a    DIET: Cardiac Diet and Diabetic Diet    ACTIVITY: Activity as tolerated    WOUND CARE: n/a    EQUIPMENT needed: as determined by PT/OT      DISCHARGE MEDICATIONS:   See Medication Reconciliation Form      NOTIFY YOUR PHYSICIAN FOR ANY OF THE FOLLOWING:   Fever over 101 degrees for 24 hours. Chest pain, shortness of breath, fever, chills, nausea, vomiting, diarrhea, change in mentation, falling, weakness, bleeding.  Severe pain or pain not relieved by medications. Or, any other signs or symptoms that you may have questions about. DISPOSITION:    Home With:   OT  PT  HH  RN      x SNF/Inpatient Rehab/LTAC    Independent/assisted living    Hospice    Other:       PATIENT CONDITION AT DISCHARGE:     Functional status    Poor    x Deconditioned     Independent      Cognition   x  Lucid     Forgetful     Dementia      Catheters/lines (plus indication)    Alford     PICC     PEG    x None      Code status     Full code    x DNR      PHYSICAL EXAMINATION AT DISCHARGE:  Visit Vitals    /82 (BP Patient Position: At rest)    Pulse 82    Temp 98.9 °F (37.2 °C)    Resp 22    Wt 54.5 kg (120 lb 2.4 oz)    SpO2 95%    Breastfeeding No    BMI 22.7 kg/m2       Gen - NAD  HEENT - MMM  Neck - supple, full ROM  CV - RRR, +s4, 2/6 systolic murmur  Resp - lungs decreased at bases with crackles, no wheezing, normal WOB  GI - abdomen S, NT, ND, +BS.  No hepatosplenomegaly   - no CVA tenderness, bladder non-palpable in lower abdomen  MSK - normal tone and bulk, no edema  Neuro - A&O, no focal deficits  Psych - calm and cooperative with normal affect      CHRONIC MEDICAL DIAGNOSES:  Problem List as of 5/25/2018  Date Reviewed: 4/16/2018          Codes Class Noted - Resolved    Acute on chronic systolic HF (heart failure) ([facility] ICD-10-CM: I50.23  ICD-9-CM: 428.23  5/19/2018 - Present        Acute systolic heart failure ([facility] ICD-10-CM: I50.21  ICD-9-CM: 428.21  4/24/2018 - Present        Altered mental status, unspecified ICD-10-CM: R41.82  ICD-9-CM: 780.97  4/23/2018 - Present        SOB (shortness of breath) ICD-10-CM: R06.02  ICD-9-CM: 786.05  4/16/2018 - Present        CKD (chronic kidney disease) stage 3, GFR 30-59 ml/min ICD-10-CM: N18.3  ICD-9-CM: 585.3  10/25/2017 - Present        Vitamin D deficiency ICD-10-CM: E55.9  ICD-9-CM: 268.9  6/16/2017 - Present        Asymptomatic hyperuricemia ICD-10-CM: E79.0  ICD-9-CM: 790.6  2/16/2017 - Present        Statin intolerance ICD-10-CM: Z78.9  ICD-9-CM: 995.27  12/21/2016 - Present        Long-term use of immunosuppressant medication ICD-10-CM: Z79.899  ICD-9-CM: V58.69  11/21/2016 - Present        Seropositive rheumatoid arthritis of multiple sites [facility]) ICD-10-CM: M05.79  ICD-9-CM: 714.0  9/28/2016 - Present        Primary osteoarthritis of both knees ICD-10-CM: M17.0  ICD-9-CM: 715.16  9/28/2016 - Present        Occlusion and stenosis of carotid artery without mention of cerebral infarction ICD-10-CM: I65.29  ICD-9-CM: 433.10  8/23/2013 - Present        Weakness due to cerebrovascular accident ICD-10-CM: [id number]  ICD-9-CM: Chyrl Res  4/2/2012 - Present        Neuropathy in diabetes [facility]) ICD-10-CM: E11.40  ICD-9-CM: 250.60, 357.2  4/2/2012 - Present        PAD (peripheral artery disease) ([facility] ICD-10-CM: I73.9  ICD-9-CM: 443.9  9/7/2011 - Present        Carotid bruit ICD-10-CM: R09.89  ICD-9-CM: 785.9  8/31/2011 - Present        Claudication ([facility] ICD-10-CM: I73.9  ICD-9-CM: 443.9  8/31/2011 - Present        Weakness of left arm ICD-10-CM: R29.898  ICD-9-CM: 729.89  8/31/2011 - Present        Hyperlipidemia ICD-10-CM: E78.5  ICD-9-CM: 272.4  1/27/2010 - Present        DM (diabetes mellitus) ([facility] ICD-10-CM: E11.9  ICD-9-CM: 250.00  9/2/2009 - Present        Hypothyroid ICD-10-CM: E03.9  ICD-9-CM: 244.9  9/2/2009 - Present        Colon cancer ([facility] ICD-10-CM: C18.9  ICD-9-CM: 153.9  9/2/2009 - Present        H/O: CVA ICD-9-CM: V12.54  9/2/2009 - Present        Microalbuminuria ICD-10-CM: R80.9  ICD-9-CM: 791.0  9/2/2009 - Present        Age-related osteoporosis without current pathological fracture ICD-10-CM: M81.0  ICD-9-CM: 733.01  9/2/2009 - Present        Essential hypertension ICD-10-CM: I10  ICD-9-CM: 401.9  9/2/2009 - Present        Anemia ICD-10-CM: D64.9  ICD-9-CM: 285.9  9/2/2009 - Present                CDMP Checked:   Yes x     PROBLEM LIST Updated:  Yes x         Signed:   Alisha Marie Ban Alvarado MD  5/25/2018  9:42 AM

## 2018-05-25 NOTE — PROGRESS NOTES
Bedside and Verbal shift change report given to Annel (oncoming nurse) by [address] [place] [address] (offgoing nurse). Report included the following information SBAR, Kardex and MAR.

## 2018-05-30 RX ORDER — TERIPARATIDE 250 UG/ML
INJECTION, SOLUTION SUBCUTANEOUS
Qty: 2.4 ML | Refills: 0 | Status: SHIPPED | OUTPATIENT
Start: 2018-05-30 | End: 2018-06-22 | Stop reason: SDUPTHER

## 2018-06-01 ENCOUNTER — APPOINTMENT (OUTPATIENT)
Dept: GENERAL RADIOLOGY | Age: 83
DRG: 280 | End: 2018-06-01
Attending: EMERGENCY MEDICINE
Payer: MEDICARE

## 2018-06-01 ENCOUNTER — HOSPITAL ENCOUNTER (INPATIENT)
Age: 83
LOS: 5 days | Discharge: NURSING, REHAB OR LONG-TERM CARE | DRG: 280 | End: 2018-06-06
Attending: EMERGENCY MEDICINE | Admitting: INTERNAL MEDICINE
Payer: MEDICARE

## 2018-06-01 DIAGNOSIS — Z71.89 GOALS OF CARE, COUNSELING/DISCUSSION: ICD-10-CM

## 2018-06-01 DIAGNOSIS — I50.23 ACUTE ON CHRONIC SYSTOLIC CONGESTIVE HEART FAILURE (HCC): Primary | ICD-10-CM

## 2018-06-01 DIAGNOSIS — R41.82 ALTERED MENTAL STATUS, UNSPECIFIED ALTERED MENTAL STATUS TYPE: ICD-10-CM

## 2018-06-01 DIAGNOSIS — C50.919 METASTATIC BREAST CANCER (HCC): ICD-10-CM

## 2018-06-01 DIAGNOSIS — R06.02 SOB (SHORTNESS OF BREATH): ICD-10-CM

## 2018-06-01 PROBLEM — I21.4 NSTEMI (NON-ST ELEVATED MYOCARDIAL INFARCTION) (HCC): Status: ACTIVE | Noted: 2018-06-01

## 2018-06-01 LAB
ALBUMIN SERPL-MCNC: 2.8 G/DL (ref 3.5–5)
ALBUMIN/GLOB SERPL: 0.4 {RATIO} (ref 1.1–2.2)
ALP SERPL-CCNC: 804 U/L (ref 45–117)
ALT SERPL-CCNC: 29 U/L (ref 12–78)
ANION GAP SERPL CALC-SCNC: 12 MMOL/L (ref 5–15)
APTT PPP: 54.7 SEC (ref 22.1–32)
APTT PPP: 57.3 SEC (ref 22.1–32)
APTT PPP: 90.1 SEC (ref 22.1–32)
ARTERIAL PATENCY WRIST A: YES
AST SERPL-CCNC: 46 U/L (ref 15–37)
BASE EXCESS BLD CALC-SCNC: 1 MMOL/L
BDY SITE: ABNORMAL
BILIRUB SERPL-MCNC: 0.3 MG/DL (ref 0.2–1)
BNP SERPL-MCNC: 4165 PG/ML (ref 0–450)
BUN SERPL-MCNC: 31 MG/DL (ref 6–20)
BUN/CREAT SERPL: 24 (ref 12–20)
CALCIUM SERPL-MCNC: 9.1 MG/DL (ref 8.5–10.1)
CHLORIDE SERPL-SCNC: 101 MMOL/L (ref 97–108)
CK MB CFR SERPL CALC: 5.4 % (ref 0–2.5)
CK MB SERPL-MCNC: 5.2 NG/ML (ref 5–25)
CK SERPL-CCNC: 97 U/L (ref 26–192)
CO2 SERPL-SCNC: 22 MMOL/L (ref 21–32)
CREAT SERPL-MCNC: 1.29 MG/DL (ref 0.55–1.02)
ERYTHROCYTE [DISTWIDTH] IN BLOOD BY AUTOMATED COUNT: 19.6 % (ref 11.5–14.5)
GAS FLOW.O2 O2 DELIVERY SYS: ABNORMAL L/MIN
GLOBULIN SER CALC-MCNC: 6.3 G/DL (ref 2–4)
GLUCOSE BLD STRIP.AUTO-MCNC: 187 MG/DL (ref 65–100)
GLUCOSE BLD STRIP.AUTO-MCNC: 211 MG/DL (ref 65–100)
GLUCOSE BLD STRIP.AUTO-MCNC: 277 MG/DL (ref 65–100)
GLUCOSE BLD STRIP.AUTO-MCNC: 369 MG/DL (ref 65–100)
GLUCOSE SERPL-MCNC: 349 MG/DL (ref 65–100)
HCO3 BLD-SCNC: 27.1 MMOL/L (ref 22–26)
HCT VFR BLD AUTO: 40.1 % (ref 35–47)
HGB BLD-MCNC: 11.9 G/DL (ref 11.5–16)
MAGNESIUM SERPL-MCNC: 1.8 MG/DL (ref 1.6–2.4)
MCH RBC QN AUTO: 27.1 PG (ref 26–34)
MCHC RBC AUTO-ENTMCNC: 29.7 G/DL (ref 30–36.5)
MCV RBC AUTO: 91.3 FL (ref 80–99)
NRBC # BLD: 0 K/UL (ref 0–0.01)
NRBC BLD-RTO: 0 PER 100 WBC
O2/TOTAL GAS SETTING VFR VENT: 100 %
PCO2 BLD: 51.4 MMHG (ref 35–45)
PEEP RESPIRATORY: 7 CMH2O
PH BLD: 7.33 [PH] (ref 7.35–7.45)
PHOSPHATE SERPL-MCNC: 5.6 MG/DL (ref 2.6–4.7)
PIP ISTAT,IPIP: 18
PLATELET # BLD AUTO: 424 K/UL (ref 150–400)
PMV BLD AUTO: 9.6 FL (ref 8.9–12.9)
PO2 BLD: 276 MMHG (ref 80–100)
POTASSIUM SERPL-SCNC: 5.5 MMOL/L (ref 3.5–5.1)
PROT SERPL-MCNC: 9.1 G/DL (ref 6.4–8.2)
RBC # BLD AUTO: 4.39 M/UL (ref 3.8–5.2)
SAO2 % BLD: 100 % (ref 92–97)
SERVICE CMNT-IMP: ABNORMAL
SODIUM SERPL-SCNC: 135 MMOL/L (ref 136–145)
SPECIMEN TYPE: ABNORMAL
SPONTANEOUS TIMED, IST: YES
THERAPEUTIC RANGE,PTTT: ABNORMAL SECS (ref 58–77)
TROPONIN I BLD-MCNC: <0.04 NG/ML (ref 0–0.08)
TROPONIN I SERPL-MCNC: 0.73 NG/ML
TROPONIN I SERPL-MCNC: 1.44 NG/ML
TSH SERPL DL<=0.05 MIU/L-ACNC: 2.31 UIU/ML (ref 0.36–3.74)
WBC # BLD AUTO: 10.1 K/UL (ref 3.6–11)

## 2018-06-01 PROCEDURE — 84100 ASSAY OF PHOSPHORUS: CPT | Performed by: INTERNAL MEDICINE

## 2018-06-01 PROCEDURE — 84484 ASSAY OF TROPONIN QUANT: CPT | Performed by: EMERGENCY MEDICINE

## 2018-06-01 PROCEDURE — 80053 COMPREHEN METABOLIC PANEL: CPT | Performed by: EMERGENCY MEDICINE

## 2018-06-01 PROCEDURE — 85730 THROMBOPLASTIN TIME PARTIAL: CPT | Performed by: HOSPITALIST

## 2018-06-01 PROCEDURE — 99285 EMERGENCY DEPT VISIT HI MDM: CPT

## 2018-06-01 PROCEDURE — 85027 COMPLETE CBC AUTOMATED: CPT | Performed by: EMERGENCY MEDICINE

## 2018-06-01 PROCEDURE — 74011250637 HC RX REV CODE- 250/637: Performed by: INTERNAL MEDICINE

## 2018-06-01 PROCEDURE — 74011636637 HC RX REV CODE- 636/637: Performed by: INTERNAL MEDICINE

## 2018-06-01 PROCEDURE — 96375 TX/PRO/DX INJ NEW DRUG ADDON: CPT

## 2018-06-01 PROCEDURE — 93308 TTE F-UP OR LMTD: CPT | Performed by: INTERNAL MEDICINE

## 2018-06-01 PROCEDURE — 65620000000 HC RM CCU GENERAL

## 2018-06-01 PROCEDURE — 71045 X-RAY EXAM CHEST 1 VIEW: CPT

## 2018-06-01 PROCEDURE — 77030013033 HC MSK BPAP/CPAP MMKA -B

## 2018-06-01 PROCEDURE — 74011250636 HC RX REV CODE- 250/636: Performed by: INTERNAL MEDICINE

## 2018-06-01 PROCEDURE — 36415 COLL VENOUS BLD VENIPUNCTURE: CPT | Performed by: INTERNAL MEDICINE

## 2018-06-01 PROCEDURE — 83880 ASSAY OF NATRIURETIC PEPTIDE: CPT | Performed by: INTERNAL MEDICINE

## 2018-06-01 PROCEDURE — 82553 CREATINE MB FRACTION: CPT | Performed by: INTERNAL MEDICINE

## 2018-06-01 PROCEDURE — 94660 CPAP INITIATION&MGMT: CPT

## 2018-06-01 PROCEDURE — 74011250636 HC RX REV CODE- 250/636: Performed by: EMERGENCY MEDICINE

## 2018-06-01 PROCEDURE — 51798 US URINE CAPACITY MEASURE: CPT

## 2018-06-01 PROCEDURE — 85730 THROMBOPLASTIN TIME PARTIAL: CPT | Performed by: INTERNAL MEDICINE

## 2018-06-01 PROCEDURE — C8929 TTE W OR WO FOL WCON,DOPPLER: HCPCS

## 2018-06-01 PROCEDURE — 82962 GLUCOSE BLOOD TEST: CPT

## 2018-06-01 PROCEDURE — 5A09357 ASSISTANCE WITH RESPIRATORY VENTILATION, LESS THAN 24 CONSECUTIVE HOURS, CONTINUOUS POSITIVE AIRWAY PRESSURE: ICD-10-PCS | Performed by: EMERGENCY MEDICINE

## 2018-06-01 PROCEDURE — 84443 ASSAY THYROID STIM HORMONE: CPT | Performed by: INTERNAL MEDICINE

## 2018-06-01 PROCEDURE — 82803 BLOOD GASES ANY COMBINATION: CPT

## 2018-06-01 PROCEDURE — 93005 ELECTROCARDIOGRAM TRACING: CPT

## 2018-06-01 PROCEDURE — 83735 ASSAY OF MAGNESIUM: CPT | Performed by: INTERNAL MEDICINE

## 2018-06-01 PROCEDURE — 36600 WITHDRAWAL OF ARTERIAL BLOOD: CPT

## 2018-06-01 PROCEDURE — 96374 THER/PROPH/DIAG INJ IV PUSH: CPT

## 2018-06-01 RX ORDER — FUROSEMIDE 10 MG/ML
40 INJECTION INTRAMUSCULAR; INTRAVENOUS DAILY
Status: DISCONTINUED | OUTPATIENT
Start: 2018-06-02 | End: 2018-06-02

## 2018-06-01 RX ORDER — CLOPIDOGREL BISULFATE 75 MG/1
75 TABLET ORAL DAILY
Status: DISCONTINUED | OUTPATIENT
Start: 2018-06-01 | End: 2018-06-06 | Stop reason: HOSPADM

## 2018-06-01 RX ORDER — DEXTROSE 50 % IN WATER (D50W) INTRAVENOUS SYRINGE
12.5-25 AS NEEDED
Status: DISCONTINUED | OUTPATIENT
Start: 2018-06-01 | End: 2018-06-06 | Stop reason: HOSPADM

## 2018-06-01 RX ORDER — FUROSEMIDE 10 MG/ML
40 INJECTION INTRAMUSCULAR; INTRAVENOUS DAILY
Status: DISCONTINUED | OUTPATIENT
Start: 2018-06-01 | End: 2018-06-01

## 2018-06-01 RX ORDER — FENTANYL CITRATE 50 UG/ML
25 INJECTION, SOLUTION INTRAMUSCULAR; INTRAVENOUS
Status: COMPLETED | OUTPATIENT
Start: 2018-06-01 | End: 2018-06-01

## 2018-06-01 RX ORDER — FENTANYL CITRATE 50 UG/ML
12.5 INJECTION, SOLUTION INTRAMUSCULAR; INTRAVENOUS
Status: DISCONTINUED | OUTPATIENT
Start: 2018-06-01 | End: 2018-06-03 | Stop reason: SDUPTHER

## 2018-06-01 RX ORDER — SODIUM CHLORIDE 0.9 % (FLUSH) 0.9 %
5-10 SYRINGE (ML) INJECTION EVERY 8 HOURS
Status: DISCONTINUED | OUTPATIENT
Start: 2018-06-01 | End: 2018-06-06 | Stop reason: HOSPADM

## 2018-06-01 RX ORDER — FUROSEMIDE 10 MG/ML
INJECTION INTRAMUSCULAR; INTRAVENOUS
Status: DISPENSED
Start: 2018-06-01 | End: 2018-06-01

## 2018-06-01 RX ORDER — OXYCODONE AND ACETAMINOPHEN 5; 325 MG/1; MG/1
1 TABLET ORAL
Status: DISCONTINUED | OUTPATIENT
Start: 2018-06-01 | End: 2018-06-06 | Stop reason: HOSPADM

## 2018-06-01 RX ORDER — SPIRONOLACTONE 25 MG/1
25 TABLET ORAL DAILY
Status: DISCONTINUED | OUTPATIENT
Start: 2018-06-01 | End: 2018-06-01

## 2018-06-01 RX ORDER — CARVEDILOL 3.12 MG/1
3.12 TABLET ORAL 2 TIMES DAILY WITH MEALS
Status: DISCONTINUED | OUTPATIENT
Start: 2018-06-01 | End: 2018-06-03

## 2018-06-01 RX ORDER — ASPIRIN 81 MG/1
81 TABLET ORAL DAILY
Status: DISCONTINUED | OUTPATIENT
Start: 2018-06-01 | End: 2018-06-06 | Stop reason: HOSPADM

## 2018-06-01 RX ORDER — MAGNESIUM SULFATE 100 %
4 CRYSTALS MISCELLANEOUS AS NEEDED
Status: DISCONTINUED | OUTPATIENT
Start: 2018-06-01 | End: 2018-06-06 | Stop reason: HOSPADM

## 2018-06-01 RX ORDER — HEPARIN SODIUM 5000 [USP'U]/ML
INJECTION, SOLUTION INTRAVENOUS; SUBCUTANEOUS
Status: DISPENSED
Start: 2018-06-01 | End: 2018-06-01

## 2018-06-01 RX ORDER — ONDANSETRON 2 MG/ML
8 INJECTION INTRAMUSCULAR; INTRAVENOUS
Status: COMPLETED | OUTPATIENT
Start: 2018-06-01 | End: 2018-06-01

## 2018-06-01 RX ORDER — SODIUM CHLORIDE 0.9 % (FLUSH) 0.9 %
5-10 SYRINGE (ML) INJECTION AS NEEDED
Status: DISCONTINUED | OUTPATIENT
Start: 2018-06-01 | End: 2018-06-06 | Stop reason: HOSPADM

## 2018-06-01 RX ORDER — DOCUSATE SODIUM 100 MG/1
100 CAPSULE, LIQUID FILLED ORAL 2 TIMES DAILY
Status: DISCONTINUED | OUTPATIENT
Start: 2018-06-01 | End: 2018-06-06 | Stop reason: HOSPADM

## 2018-06-01 RX ORDER — ACETAMINOPHEN 325 MG/1
650 TABLET ORAL
Status: DISCONTINUED | OUTPATIENT
Start: 2018-06-01 | End: 2018-06-06 | Stop reason: HOSPADM

## 2018-06-01 RX ORDER — INSULIN LISPRO 100 [IU]/ML
INJECTION, SOLUTION INTRAVENOUS; SUBCUTANEOUS
Status: DISCONTINUED | OUTPATIENT
Start: 2018-06-01 | End: 2018-06-06 | Stop reason: HOSPADM

## 2018-06-01 RX ORDER — HEPARIN SODIUM 5000 [USP'U]/ML
5000 INJECTION, SOLUTION INTRAVENOUS; SUBCUTANEOUS
Status: COMPLETED | OUTPATIENT
Start: 2018-06-01 | End: 2018-06-01

## 2018-06-01 RX ORDER — HEPARIN SODIUM 10000 [USP'U]/100ML
12-25 INJECTION, SOLUTION INTRAVENOUS
Status: DISCONTINUED | OUTPATIENT
Start: 2018-06-01 | End: 2018-06-03

## 2018-06-01 RX ORDER — LEVOTHYROXINE SODIUM 88 UG/1
88 TABLET ORAL
Status: DISCONTINUED | OUTPATIENT
Start: 2018-06-01 | End: 2018-06-06 | Stop reason: HOSPADM

## 2018-06-01 RX ORDER — FOLIC ACID 1 MG/1
1 TABLET ORAL DAILY
Status: DISCONTINUED | OUTPATIENT
Start: 2018-06-01 | End: 2018-06-06 | Stop reason: HOSPADM

## 2018-06-01 RX ORDER — CARVEDILOL 6.25 MG/1
6.25 TABLET ORAL 2 TIMES DAILY WITH MEALS
Status: DISCONTINUED | OUTPATIENT
Start: 2018-06-01 | End: 2018-06-01

## 2018-06-01 RX ORDER — FUROSEMIDE 10 MG/ML
40 INJECTION INTRAMUSCULAR; INTRAVENOUS EVERY 12 HOURS
Status: DISCONTINUED | OUTPATIENT
Start: 2018-06-01 | End: 2018-06-01

## 2018-06-01 RX ORDER — ONDANSETRON 2 MG/ML
4 INJECTION INTRAMUSCULAR; INTRAVENOUS
Status: DISCONTINUED | OUTPATIENT
Start: 2018-06-01 | End: 2018-06-06 | Stop reason: HOSPADM

## 2018-06-01 RX ORDER — LISINOPRIL 5 MG/1
2.5 TABLET ORAL DAILY
Status: DISCONTINUED | OUTPATIENT
Start: 2018-06-01 | End: 2018-06-01

## 2018-06-01 RX ORDER — ANASTROZOLE 1 MG/1
1 TABLET ORAL DAILY
Status: DISCONTINUED | OUTPATIENT
Start: 2018-06-01 | End: 2018-06-06 | Stop reason: HOSPADM

## 2018-06-01 RX ORDER — FUROSEMIDE 10 MG/ML
80 INJECTION INTRAMUSCULAR; INTRAVENOUS
Status: COMPLETED | OUTPATIENT
Start: 2018-06-01 | End: 2018-06-01

## 2018-06-01 RX ADMIN — ASPIRIN 81 MG: 81 TABLET, COATED ORAL at 09:28

## 2018-06-01 RX ADMIN — HEPARIN SODIUM 10 UNITS/KG/HR: 10000 INJECTION, SOLUTION INTRAVENOUS at 21:18

## 2018-06-01 RX ADMIN — FENTANYL CITRATE 25 MCG: 50 INJECTION, SOLUTION INTRAMUSCULAR; INTRAVENOUS at 04:26

## 2018-06-01 RX ADMIN — INSULIN LISPRO 3 UNITS: 100 INJECTION, SOLUTION INTRAVENOUS; SUBCUTANEOUS at 17:28

## 2018-06-01 RX ADMIN — CARVEDILOL 6.25 MG: 6.25 TABLET, FILM COATED ORAL at 09:28

## 2018-06-01 RX ADMIN — Medication 10 ML: at 13:37

## 2018-06-01 RX ADMIN — FUROSEMIDE 80 MG: 10 INJECTION, SOLUTION INTRAMUSCULAR; INTRAVENOUS at 03:49

## 2018-06-01 RX ADMIN — NITROGLYCERIN 1 INCH: 20 OINTMENT TOPICAL at 04:59

## 2018-06-01 RX ADMIN — ONDANSETRON 8 MG: 2 INJECTION INTRAMUSCULAR; INTRAVENOUS at 04:27

## 2018-06-01 RX ADMIN — HEPARIN SODIUM 5000 UNITS: 5000 INJECTION, SOLUTION INTRAVENOUS; SUBCUTANEOUS at 03:50

## 2018-06-01 RX ADMIN — Medication 10 ML: at 05:58

## 2018-06-01 RX ADMIN — HEPARIN SODIUM 12 UNITS/KG/HR: 10000 INJECTION, SOLUTION INTRAVENOUS at 06:12

## 2018-06-01 RX ADMIN — ANASTROZOLE 1 MG: 1 TABLET, COATED ORAL at 09:28

## 2018-06-01 RX ADMIN — CLOPIDOGREL BISULFATE 75 MG: 75 TABLET ORAL at 09:28

## 2018-06-01 RX ADMIN — FOLIC ACID 1 MG: 1 TABLET ORAL at 09:28

## 2018-06-01 RX ADMIN — Medication 10 ML: at 22:38

## 2018-06-01 RX ADMIN — INSULIN LISPRO 7 UNITS: 100 INJECTION, SOLUTION INTRAVENOUS; SUBCUTANEOUS at 06:36

## 2018-06-01 RX ADMIN — INSULIN LISPRO 5 UNITS: 100 INJECTION, SOLUTION INTRAVENOUS; SUBCUTANEOUS at 12:22

## 2018-06-01 RX ADMIN — DOCUSATE SODIUM 100 MG: 100 CAPSULE, LIQUID FILLED ORAL at 09:28

## 2018-06-01 RX ADMIN — Medication 1 CAPSULE: at 09:28

## 2018-06-01 RX ADMIN — DOCUSATE SODIUM 100 MG: 100 CAPSULE, LIQUID FILLED ORAL at 17:29

## 2018-06-01 NOTE — PROGRESS NOTES
Problem: Falls - Risk of  Goal: *Absence of Falls  Document Yaron Fall Risk and appropriate interventions in the flowsheet. Outcome: Progressing Towards Goal  Fall Risk Interventions:                 Elimination Interventions: Toileting schedule/hourly rounds             Problem: Pressure Injury - Risk of  Goal: *Prevention of pressure injury  Document Calderon Scale and appropriate interventions in the flowsheet. Outcome: Progressing Towards Goal  Pressure Injury Interventions:       Moisture Interventions: Absorbent underpads, Moisture barrier, Minimize layers    Activity Interventions: Pressure redistribution bed/mattress(bed type)    Mobility Interventions: Pressure redistribution bed/mattress (bed type), Turn and reposition approx.  every two hours(pillow and wedges)    Nutrition Interventions: Offer support with meals,snacks and hydration    Friction and Shear Interventions: Apply protective barrier, creams and emollients, Lift team/patient mobility team, Lift sheet

## 2018-06-01 NOTE — IP AVS SNAPSHOT
110 [facility] [place] [address] [place] [address] 
[phone] Patient: Samm Travis MRN: [id number] FKD:[id number] A check stella indicates which time of day the medication should be taken. My Medications START taking these medications Instructions Each Dose to Equal  
 Morning Noon Evening Bedtime  
 nitroglycerin 0.4 mg SL tablet Commonly known as:  NITROSTAT Your last dose was: Your next dose is:    
   
   
 1 Tab by SubLINGual route as needed for Chest Pain. Up to 3 doses. 0.4 mg  
    
   
   
   
  
  
CONTINUE taking these medications Instructions Each Dose to Equal  
 Morning Noon Evening Bedtime  
 anastrozole 1 mg tablet Commonly known as:  ARIMIDEX Your last dose was: Your next dose is: Take 1 Tab by mouth daily. 1 mg  
    
   
   
   
  
 aspirin delayed-release 81 mg tablet Your last dose was: Your next dose is: Take 81 mg by mouth daily. 81 mg  
    
   
   
   
  
 carvedilol 6.25 mg tablet Commonly known as:  Carrolymicheline Peabody Your last dose was: Your next dose is: Take 1 Tab by mouth two (2) times daily (with meals). 6.25 mg  
    
   
   
   
  
 clopidogrel 75 mg Tab Commonly known as:  PLAVIX Your last dose was: Your next dose is: Take 1 Tab by mouth daily. 75 mg  
    
   
   
   
  
 docusate sodium 100 mg capsule Commonly known as:  Girish Tay Your last dose was: Your next dose is: Take 1 Cap by mouth two (2) times a day for 90 days. 100 mg  
    
   
   
   
  
 epoetin celio 10,000 unit/mL injection Commonly known as:  EPOGEN;PROCRIT Your last dose was: Your next dose is:    
   
   
 1 mL by SubCUTAneous route every Monday, Wednesday, Friday. 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  
 86099 Units MAKAYLA-C 500 mg Tab Generic drug:  ascorbate calcium Your last dose was: Your next dose is: Take 1,000 mg by mouth daily. 1000 mg  
    
   
   
   
  
 etanercept 50 mg/mL (0.98 mL) injection Commonly known as:  ENBREL Your last dose was: Your next dose is:    
   
   
 1 mL by SubCUTAneous route every seven (7) days. 50 mg  
    
   
   
   
  
 evolocumab pen injection Commonly known as:  Gypsy Hurtado Your last dose was: Your next dose is:    
   
   
 1 mL by SubCUTAneous route every fourteen (14) days. 140 mg  
    
   
   
   
  
 folic acid 1 mg tablet Commonly known as:  Google Your last dose was: Your next dose is: TAKE ONE TABLET BY MOUTH DAILY FORTEO 20 mcg/dose - 600 mcg/2.4 mL Pnij injection Generic drug:  teriparatide Your last dose was: Your next dose is: INJECT 20MCG ONCE DAILY  
     
   
   
   
  
 furosemide 20 mg tablet Commonly known as:  LASIX Your last dose was: Your next dose is: Take one pill daily by mouth .  
     
   
   
   
  
 glimepiride 1 mg tablet Commonly known as:  AMARYL Your last dose was: Your next dose is: Take 1 mg by mouth every morning. 1 mg HAIR,SKIN & NAILS PO Your last dose was: Your next dose is: Take 1 Tab by mouth daily. 1 Tab JANUVIA 25 mg tablet Generic drug:  SITagliptin Your last dose was: Your next dose is: Take 50 mg by mouth daily. 50 mg  
    
   
   
   
  
 L. acidoph & paracasei- S therm- Bifido 8 billion cell Cap cap Commonly known as:  BERYL-Q/RISAQUAD Your last dose was: Your next dose is: Take 1 Cap by mouth daily. 1 Cap  
    
   
   
   
  
 lisinopril 2.5 mg tablet Commonly known as:  Ware Niall  
   
 Your last dose was: Your next dose is: Take 1 Tab by mouth daily. 2.5 mg  
    
   
   
   
  
 MAGNESIUM MALATE Your last dose was: Your next dose is: Take 400 mg by mouth daily. 400 mg  
    
   
   
   
  
 OCUVITE PO Your last dose was: Your next dose is: Take 1 Tab by mouth daily. 1 Tab OMEGA 3 FISH OIL PO Your last dose was: Your next dose is: Take 300 mg by mouth daily. 300 mg  
    
   
   
   
  
 phosphorus 250 mg tablet Commonly known as:  K PHOS NEUTRAL Your last dose was: Your next dose is: Take 1 Tab by mouth two (2) times a day. 1 Tab  
    
   
   
   
  
 polyethylene glycol 17 gram packet Commonly known as:  eB Fretheresa Your last dose was: Your next dose is: Take 1 Packet by mouth daily. 17 g SYNTHROID 88 mcg tablet Generic drug:  levothyroxine Your last dose was: Your next dose is: Take 88 mcg by mouth Daily (before breakfast). 88 mcg VITAMIN D3 PO Your last dose was: Your next dose is: Take 1,000 Units by mouth daily. 1000 Units STOP taking these medications   
 spironolactone 25 mg tablet Commonly known as:  ALDACTONE Where to Get Your Medications Information on where to get these meds will be given to you by the nurse or doctor. ! Ask your nurse or doctor about these medications  
  nitroglycerin 0.4 mg SL tablet

## 2018-06-01 NOTE — H&P
[address] [place]   HISTORY AND PHYSICAL      Valdo uQiñonez.  MR#: [id number]  : 1935  ACCOUNT #: [de-identified]   ADMIT DATE: 2018    Admission order was placed at 04:52. The patient was seen shortly after that. PRIMARY CARE PHYSICIAN:  Quinton Hagan MD     SOURCE OF INFORMATION:  The patient's ED medical records and old medical records. CHIEF COMPLAINT:  Shortness of breath and chest pain. HISTORY OF PRESENT ILLNESS:  This is an 30-year-old woman with a past medical history significant for coronary artery disease, chronic systolic congestive heart failure, metastatic left breast cancer, hypertension, type 2 diabetes, hypothyroidism, rheumatoid arthritis, dyslipidemia, chronic kidney disease, colon cancer, was in her usual state of health until the day of her presentation at the emergency room when the patient developed shortness of breath and chest pain at the rehab facility where the patient is presently receiving rehabilitation. The symptoms started 2 hours before coming to the emergency room. This shortness of breath is with little or no activities. The chest pain is located at the center of the chest with talc radiation. No known aggravating or relieving factors. The chest pain is 10/10 in severity, sharp and constant. At the onset of the patient's symptoms, there was concern for ST elevation myocardial infarction. The emergency room department was alerted. When the patient arrived at the emergency room, code STEMI was called. The protocol for code STEMI was followed. The cardiology came to the emergency room. The cardiology is of the opinion that the patient has a non-ST elevation myocardial infarction and advised admission to the hospitalist service. Patient was also found to be in acute on chronic systolic congestive heart failure. She was last admitted to this hospital from 2018 to 2018.   The patient was admitted and treated for non-ST elevation myocardial infarction, underwent a cardiac catheterization which shows 3 vessel disease, but it was stated that the patient is not a candidate for CABG or stent placement. Medical management was advised. During that hospitalization, patient was also in acute respiratory failure attributed to congestive heart failure, was treated. The patient's respiratory failure resolved with treatment of the congestive heart failure, was discharged from the hospital without oxygen requirement. When the patient arrived at the emergency room today, she was in severe respiratory distress. Patient was placed on BiPAP. No history of fever. No rashes or tenderness. No chills. Patient was seen in the emergency room by the cardiologist before the patient was referred to the hospitalist service for admission. PAST MEDICAL HISTORY:  Coronary artery disease, chronic systolic congestive heart failure, hypertension, metastatic left breast cancer, type 2 diabetes, hypothyroidism, rheumatoid arthritis, dyslipidemia, chronic kidney disease. ALLERGIES:  PATIENT IS ALLERGIC TO SULFA AND STATINS. MEDICATIONS:  Arimidex 1 mg daily, calcium carbonate 1 capsule daily, aspirin 81 mg daily, Coreg 6.25 mg twice daily, Plavix 75 mg daily, Colace 100 mg twice daily, Procrit 10,000 units 3 times a week on Monday, Wednesday, Friday, Enbrel 50 mg every 7 days, Repatha dosage as directed, folic acid 1 mg daily, Lasix 20 mg daily, Amaryl 1 mg daily in the morning, Synthroid 88 mcg daily, lisinopril 2.5 mg daily, multivitamin 1 tablet daily, phosphorus 250 mg twice daily, Januvia 50 mg b.i.d., Aldactone 25 mg daily. FAMILY HISTORY:  This was reviewed. Her mother had diabetes and stroke. Her father had stomach cancer. PAST SURGICAL HISTORY:  This is significant for colectomy, hysterectomy. SOCIAL HISTORY:  No history of alcohol or tobacco abuse.     REVIEW OF SYSTEMS:    HEENT:  No headache, no dizziness, no blurring of visions, no photophobia. RESPIRATORY:  This is positive for shortness of breath. No cough, no hemoptysis. CARDIOVASCULAR:  This is positive for orthopnea and chest pain. No palpitations. GASTROINTESTINAL:  No nausea, vomiting, no diarrhea, no constipation. GENITOURINARY:  No dysuria, no urgency and no frequency. All other systems are reviewed and they are negative. PHYSICAL EXAMINATION:  GENERAL:  The patient appeared ill in critical condition. BiPAP line on arrival at the emergency room. VITAL SIGNS:  Temperature not available, pulse 130, respiratory rate 35, blood pressure 170/94, oxygen saturation 98% on BiPAP. HEAD:  Normocephalic, atraumatic. EYES:  Normal eye  movements. No redness, no drainage, no discharge. EARS:  Normal external ears with no obvious drainage. NOSE:  No deformity and no drainage. MOUTH AND THROAT:  No visible oral lesion. NECK:  Supple, mild JVD, no thyromegaly. CHEST:  Bilateral basilar crackles and few expiratory wheezing. HEART:  Normal S1 and S2 and regular. No clinically appreciable murmurs. ABDOMEN:  Soft, nontender. Normal bowel sounds. CENTRAL NERVOUS SYSTEM:  Alert, oriented to person and place. No gross or focal neurological deficit. EXTREMITIES:  No edema. Pulses 2+ bilaterally. MUSCULOSKELETAL:  No obvious joint deformity or swelling. SKIN:  No active skin lesions seen in the exposed part of the body. PSYCHIATRIC:  Normal mood and affect. LYMPHATIC:  No cervical lymphadenopathy. DIAGNOSTIC DATA:  EKG shows sinus tachycardia and ST and T-wave abnormalities. Chest x-ray shows new moderate interstitial pulmonary edema. LABORATORY DATA:  Pro-BNP 4165. Troponin level 0.73. Chemistry:  Sodium 135, potassium 5.5, chloride 101, CO2 22, glucose 349, BUN 31, creatinine 1.29, calcium 9.1, bilirubin total 0.3, ALT 29, AST 43, alkaline phosphatase 804, total protein 9.1, albumin level 2.8, globulin 6.3.   ABG done in the emergency room, pH 7.330, pCO2 of 51.4, pO2 276, bicarbonate 27.1. This ABG was done on BiPAP. Hematology:  WBC 10.1, hemoglobin 11.9, hematocrit 40.1, platelet 901. ASSESSMENT:  1.  Non-ST elevation myocardial infarction. 2.  Acute on chronic systolic congestive heart failure. 3.  Metastatic left breast cancer. 4.  Hypertension. 5.  Type 2 diabetes with hyperglycemia  6. Acute respiratory failure with hypoxia. 7.  Hypothyroidism. 8.  Rheumatoid arthritis. 9.  Dyslipidemia. 10.  Chronic kidney disease stage III. PLAN:  1.  Non-ST elevation myocardial infarction. We will admit the patient for further evaluation and treatment. We will start the patient on continuous infusion of heparin, aspirin and Plavix as well as nitro paste as advised by the cardiologist.  We will obtain serial cardiac markers. 2.  Acute on chronic systolic congestive heart failure. We will start the patient on Lasix. Echocardiogram done in April of this year shows ejection fraction of 45%. 3.  Metastatic left breast cancer. We will continue with preadmission medications. 4.  Hypertension. We will resume home medication and monitor the patient's blood pressure closely. 5.  Type 2 diabetes with hyperglycemia. Will start the patient on sliding scale with insulin coverage. The most recent hemoglobin A1c level was 7.0.  6.  Acute respiratory failure with hypoxia. This is most likely due to congestive heart failure. We will treat underlying etiological part. We will continue with BiPAP. 7.  Hypothyroidism. We will continue with Synthroid. Will check TSH level. 8.  Rheumatoid arthritis. We will resume home medications. 9.  Dyslipidemia. Will continue with her preadmission medications. 10.  Chronic kidney disease stage III. We will monitor the patient's renal function. 11.  Other issues. CODE STATUS:  THE PATIENT IS A DO NOT RESUSCITATE.   Patient will be started on continuous infusion of heparin for treatment of a non-ST elevation myocardial infarction. Because of that, there is no need for DVT prophylaxis with heparin.       MD LEAH Kearney/MARIELA  D: 06/01/2018 05:28     T: 06/01/2018 06:43  JOB #: [id number]  CC: Lory Nguyen MD

## 2018-06-01 NOTE — PROGRESS NOTES
Patients son called concerned about patients blood pressures. Patient currently 90/40-50. Patients POA made this writer aware that no information is to be shared with patients caregiver as \"she barley speaks english\" per patients son. POA requesting a call from hospitalist. Dr. Jessica Gonzalez updated on patients condition and POA's number given to physician.

## 2018-06-01 NOTE — ED TRIAGE NOTES
Triage Note:  Patient presents to ED via [facility] after complaining of chest pain, STEMI alert called. Patient was hospitalized within the past week for NSTEMI.    0350:  Spoke with Zachery Whipple from [facility], obtained faxed copy of MD orders with proof of DNR.      2853:  Son at bedside with Pastoral [facility]. 0448:  Pharmacy called to have weight put in for medication administration. 4071:  Message sent to pharmacy for medication confirmation. 4442:  Report called to Juan C Agustin RN, chart updated, patient is ready for transport.

## 2018-06-01 NOTE — PROGRESS NOTES
0915-patient with low 90s blood pressures. Sarah Beth Anne contacted regarding this. PA advised to hold this am dose of ACE and BETA BLOCKER. Will continue to monitor.

## 2018-06-01 NOTE — NURSE NAVIGATOR
Chart reviewed by Heart Failure Nurse Navigator. Heart Failure database completed. EF:  30     ACEi/ARB: Held due to elevated creatinine     BB: Coreg    Aldosterone Antagonist: Held due to elevated potassium     CRT DNR. NYHA Functional Class IV. Heart Failure Teach Back in Patient Education. Heart Failure Avoiding Triggers on Discharge Instructions. Cardiologist: KATHLEEN    Outpatient nurse navigator notified of readmission. Readmit from [address] [place] [address].   Prior admit final coding NSTEMI

## 2018-06-01 NOTE — PROGRESS NOTES
Problem: Falls - Risk of  Goal: *Absence of Falls  Document Yaron Fall Risk and appropriate interventions in the flowsheet. Outcome: Progressing Towards Goal  Fall Risk Interventions:            Medication Interventions: Patient to call before getting OOB, Teach patient to arise slowly    Elimination Interventions: Call light in reach, Patient to call for help with toileting needs, Toileting schedule/hourly rounds             Problem: Pressure Injury - Risk of  Goal: *Prevention of pressure injury  Document Calderon Scale and appropriate interventions in the flowsheet.    Outcome: Progressing Towards Goal  Pressure Injury Interventions:       Moisture Interventions: Absorbent underpads    Activity Interventions: Pressure redistribution bed/mattress(bed type)    Mobility Interventions: Pressure redistribution bed/mattress (bed type)    Nutrition Interventions: Document food/fluid/supplement intake, Offer support with meals,snacks and hydration    Friction and Shear Interventions: Apply protective barrier, creams and emollients, Lift sheet, Minimize layers

## 2018-06-01 NOTE — PROGRESS NOTES
Bedside verbal shift change report given to oncoming nurse Mellisa Harrison R.N. Opportunity for questions and clarifications provided.

## 2018-06-01 NOTE — ED PROVIDER NOTES
HPI   82year old female with h/o colon cancer, DM, GERD, recent NSTEMI and cath with diffuse 3v disease not a surgical candidate, presents with acute SOB, diaphoresis and chest pain. Report by EMS is chest pain for 2 hours. Patient initially denied chest pain and reported only SOB but after EMS questioned her about what she told them, she said she does have some discomfort. Reviewing medical records, she was in the hospital prolonged in April with NSTEMI and refused cath. She went to [facility], returned with similar episode as today and did agree to cath. She has breast cancer. It possible she refused surgery. She is a DNR per [address]. Past Medical History:   Diagnosis Date    Anemia 2009    Colon cancer ([facility] 2009    surgery/chemo    Colon cancer ([facility] 2009    DM (diabetes mellitus) ([facility] 2009    GERD (gastroesophageal reflux disease)     H/O: CVA 2009    slight l sided weakness    Hypothyroid 2009    Ill-defined condition     seasonal allergies    Microalbuminuria 2009    Osteoporosis 2009    Other and unspecified hyperlipidemia 2010    Rheumatoid arthritis involving ankle ([address] 2016    Rheumatoid arthritis(714.0) 2009    Unspecified essential hypertension 2009    Weakness due to cerebrovascular accident        Past Surgical History:   Procedure Laterality Date    HX COLECTOMY      HX HYSTERECTOMY      HX OTHER SURGICAL      colonoscopies numerous since          Family History:   Problem Relation Age of Onset    Diabetes Mother     Stroke Mother     Diabetes Sister     Other Sister      fell and hit her head -  of this   [facility] Diabetes Brother     Cancer Father      stomach    Diabetes Sister        Social History     Social History    Marital status:      Spouse name: N/A    Number of children: N/A    Years of education: N/A     Occupational History    Not on file.      Social History Main Topics    Smoking status: Never Smoker    Smokeless tobacco: Never Used    Alcohol use No    Drug use: No    Sexual activity: Not Currently     Partners: Male     Other Topics Concern    Not on file     Social History Narrative         ALLERGIES: Statins-hmg-coa reductase inhibitors and Sulfa (sulfonamide antibiotics)    Review of Systems   Unable to perform ROS: Acuity of condition       Vitals:    06/01/18 0349   BP: (!) 170/94   Pulse: (!) 130            Physical Exam   Constitutional: She is oriented to person, place, and time. She appears well-developed and well-nourished. She appears distressed. HENT:   Head: Normocephalic and atraumatic. Mouth/Throat: Oropharynx is clear and moist. No oropharyngeal exudate. Eyes: Conjunctivae and EOM are normal. Pupils are equal, round, and reactive to light. Right eye exhibits no discharge. Left eye exhibits no discharge. No scleral icterus. Neck: Normal range of motion. Neck supple. No JVD present. Cardiovascular: Regular rhythm, normal heart sounds and intact distal pulses. Tachycardia present. Exam reveals no gallop and no friction rub. No murmur heard. Pulmonary/Chest: Breath sounds normal. No stridor. She is in respiratory distress. She has no wheezes. She has no rales. She exhibits no tenderness. Abdominal: Soft. Bowel sounds are normal. She exhibits no distension and no mass. There is no tenderness. There is no rebound and no guarding. Musculoskeletal: Normal range of motion. She exhibits no edema or tenderness. Neurological: She is alert and oriented to person, place, and time. She has normal reflexes. No cranial nerve deficit. She exhibits normal muscle tone. Coordination normal.   Skin: Skin is warm. No rash noted. No erythema. diaphoretic   Psychiatric: She has a normal mood and affect.  Her behavior is normal. Judgment and thought content normal.        MDM  Number of Diagnoses or Management Options  Critical Care  Total time providing critical care: 30-74 minutes  60 minutes      ED Course       Procedures         ED EKG interpretation:  Rhythm: sinus tachycardia; and regular . Rate (approx.): 132; Axis: normal; P wave: normal; QRS interval: prolonged; ST/T wave: non-specific changes; ST depression inferior with anterior ST elevation. This EKG was interpreted by Tex Cogan, MD,ED Provider. Dr. Katiana Dunn at bedside evaluating patient. Would like 5000u heparin bolus. Would like patient admitted to hospitalist. Son at bedside talking with cardiology. \    CXR c/w failure. 80mg lasix already ordered.

## 2018-06-01 NOTE — INTERDISCIPLINARY ROUNDS
IDR/SLIDR Summary          Patient: Prakash Rothman MRN: [id number]    Age: 82 y.o. YOB: 1935 Room/Bed: [address] [place] [address]   Admit Diagnosis: NSTEMI (non-ST elevated myocardial infarction) ([facility]  Principal Diagnosis: NSTEMI (non-ST elevated myocardial infarction) ([facility]   Goals: stable hr/rhythm, stable labs  Readmission: YES  Quality Measure: AMI  VTE Prophylaxis: Chemical  Influenza Vaccine screening completed? YES  Pneumococcal Vaccine screening completed? YES  Mobility needs: Yes   Nutrition plan:Yes  Consults:Respiratory and Case Management    Financial concerns:Yes  Escalated to CM? YES  RRAT Score: 62   Interventions:H2H and Palliative Care   Testing due for pt today? YES  LOS: 0 days Expected length of stay ?  days  Discharge plan: tbd   PCP: Rosy Anguiano MD  Transportation needs: Yes    Days before discharge:two or more days before discharge   Discharge disposition: Home    Signed:     Shantal Escalona RN  6/1/2018  6:15 AM

## 2018-06-01 NOTE — WOUND CARE
WOCN Note:     New consult placed by RN for sacrum. Chart shows:  Admitted for NSTEMI; history of DM, colon cancer, CVA    Assessment:   Patient is communicative, continent and mobile - turned independently for assessment. Patient wearing briefs and reports ~3 stools yesterday but not truly liquid. Bed: total care  Patient reports no pain. Bilateral heel skin intact and without erythema. 1. POA, lower sacrum & gluteal cleft chronic wound per pt report likely mixed etiology of pressure and moisture = 2 x 0.5 x 0.2cm  90% moist yellow/white 10% central pink. no exudate. Periwound intact & with scattered hypopimented tissue. Duoderm applied. Patient repositioned on left side. Recommendations:    Sacrum: Please maintain Duoderm up to one week or change as needed with soiling or rolled edges. Please use adhesive remover wipes when changing. Minimize layers of linen/pads under patient to optimize support surface. Turn/reposition approximately every 2 hours and offload heels.   Manage incontinence / promote continence    Discussed above plan with patient, RN & Dr. Bernadette Barry    Transition of Care: Plan to follow weekly and as needed while admitted to hospital.    FREDO ZelayaN, RN, [facility]  Certified Wound, Ostomy, Continence Nurse  office [phone]  pager [phone] or call  to page

## 2018-06-01 NOTE — PROGRESS NOTES
Spiritual Care Assessment/Progress Note  [facility]      NAME: Samira Orozco      MRN: [id number]  AGE: 82 y.o. SEX: female  Judaism Affiliation: Presleyi   Language: English     6/1/2018     Total Time (in minutes): 33     Spiritual Assessment begun in [address] through conversation with:         [x]Patient        [x] Family    [] Friend(s)        Reason for Consult: Crisis, Stemi     Spiritual beliefs: (Please include comment if needed)     [x] Identifies with a uzma tradition:Synagogue     [] Supported by a uzma community:      [] Claims no spiritual orientation:      [] Seeking spiritual identity:           [] Adheres to an individual form of spirituality:      [] Not able to assess:                     Identified resources for coping:      [x] Prayer                               [] Music                  [] Guided Imagery     [x] Family/friends                 [] Pet visits     [] Devotional reading                         [] Unknown     [] Other:                                              Interventions offered during this visit: (See comments for more details)    Patient Interventions: Prayer (actual), Prayer (assurance of)     Family/Friend(s):  Affirmation of emotions/emotional suffering, Catharsis/review of pertinent events in supportive environment, Coping skills reviewed/reinforced, Affirmation of uzma, Prayer (actual), Prayer (assurance of)     Plan of Care:     [x] Support spiritual and/or cultural needs    [] Support AMD and/or advance care planning process      [] Support grieving process   [] Coordinate Rites and/or Rituals    [] Coordination with community clergy   [] No spiritual needs identified at this time   [] Detailed Plan of Care below (See Comments)  [] Make referral to Music Therapy  [] Make referral to Pet Therapy     [] Make referral to Addiction services  [] Make referral to [facility]  [] Make referral to Spiritual Care Partner  [] No future visits requested [x] Follow up visits as needed     Comments: Responded to Code STEMI for this pt in ED30. Pt's son was present for this visit. Offered pastoral presence and support through prayer. Pt and son orient toward the Pentecostal uzma. Pt's son shared some life review about pt's previous health challenge including this being pt's second heart event in 2 weeks. Pt and pt's son found comfort in prayer. Assured pt and pt's son of continuous prayers and affirmed ongoing availability of support. Dolores Padilla MDiv.  Staff   Please call [phone] to page  if needed

## 2018-06-01 NOTE — CONSULTS
Aleksander Elizabeth MD    [address] [facility] [place], [address] [facility]    Office [phone],AWN [phone]  Pager [phone]    Date of  Admission: 6/1/2018  3:29 AM  PCP- Purnima Agustin MD    Flavio Carlin is a 82 y.o. female admitted for There are no admission diagnoses documented for this encounter. . Consult requested by Moni Ragland MD    Assessment/Plan    Acute Congestive SHF   ACS - NSTEMI  Metastatic Breast Ca  DM/HTN  CKD  Hx of Colon Ca      Plan:  Pt has severe multivessel CAD- not a surgical candidate/being medically managed per recent hosp admn notes. Pt DNR/DNI per records/son confirms  Medical management - IV diuretics/IV heparin gtt/ASA/Plavix/Bipap/ Nitropaste if tolerated. On Coreg/Lisinopril - PTA. Will not tolerate PO meds currently. Serial trop. Recheck ECHO - evaluate EF  Palliative care consult           Pt critically ill    I appreciate the opportunity to be involved in Ms. Li. See below note for details. Please do not hesitate to contact us with questions or concerns. Aleksander Elizabeth MD    Cardiac Testing/ Procedures: A. Cardiac Cath/PCI:  5/21/18 - Severe diffuse 3 vessel disease multiple lesions not a candidate for CABG will treat medically. Mynx to groin. - Dr Samy Dc     B.ECHO/JIMMIE: 4/26/18 Left ventricle: Size was at the upper limits of normal. Systolic function  was mildly reduced. Ejection fraction was estimated to be 45 %. There was  mild hypokinesis of the basal-mid anteroseptal and apical septal wall(s). Left atrium: The atrium was mildly to moderately dilated. Right atrium: The atrium was mildly dilated. Mitral valve: There was moderate regurgitation. Aortic valve: Leaflets exhibited sclerosis without stenosis. Pericardium: A probable, small pericardial effusion was identified  posterior to the heart. There was no evidence of hemodynamic compromise. There was a moderate-sized left pleural effusion.     C.StressNuclear/Stress ECHO/Stress test:    D.Vascular:    E. EP:    F. Miscellaneous:    Care Plan discussed with: Patient, Family (son - Joaquin Allen, another son Alley Hanna is in [place]) , Nursing Staff, Dr Crystal Ellis    Subjective:    Pt is a 82year old with multiple co morbidities - rheumatoid arthritis, CKD stage 3, DM w neuropathy, hx colon cancer, metastatic breast cancer, Ischemic CMP - EF 45% ( 2018); NSTEMI with cardiac cath 18 with severe 3 vessel dz ( not a candidate for surgery) and being treated medically ( Cardiologist -Dr Fontenot Side) and who was brought [facility] ED from [facility] for worsening dyspnea. Pt denies CP. Not a good historian - on bipap. EKG as noted below. Trop 0.73. Received 80mg of Lasix in ED. Cr 1.19 - baseline. (CODE STEMI activated by EMS - cancelled in ED).     Past Medical History:   Diagnosis Date    Anemia 2009    Colon cancer ([address] 2009    surgery/chemo    Colon cancer ([address] 2009    DM (diabetes mellitus) ([address] 2009    GERD (gastroesophageal reflux disease)     H/O: CVA 2009    slight l sided weakness    Hypothyroid 2009    Ill-defined condition     seasonal allergies    Microalbuminuria 2009    Osteoporosis 2009    Other and unspecified hyperlipidemia 2010    Rheumatoid arthritis involving ankle ([address] 2016    Rheumatoid arthritis(714.0) 2009    Unspecified essential hypertension 2009    Weakness due to cerebrovascular accident       Past Surgical History:   Procedure Laterality Date    HX COLECTOMY      HX HYSTERECTOMY      HX OTHER SURGICAL      colonoscopies numerous since 1993     Allergies   Allergen Reactions    Statins-Hmg-Coa Reductase Inhibitors Other (comments)     Intolerant to statins     Sulfa (Sulfonamide Antibiotics) Other (comments)     syncope     Family History   Problem Relation Age of Onset    Diabetes Mother     Stroke Mother     Diabetes Sister     Other Sister      fell and hit her head -  of this   [facility] Diabetes Brother  Cancer Father      stomach    Diabetes Sister       Social History   Substance Use Topics    Smoking status: Never Smoker    Smokeless tobacco: Never Used    Alcohol use No          Medications:    (Not in a hospital admission)  Current Facility-Administered Medications   Medication Dose Route Frequency    furosemide (LASIX) 10 mg/mL injection        heparin (porcine) 5,000 unit/mL injection         Current Outpatient Prescriptions   Medication Sig    FORTEO 20 mcg/dose - 600 mcg/2.4 mL pnij injection INJECT 20MCG ONCE DAILY    clopidogrel (PLAVIX) 75 mg tab Take 1 Tab by mouth daily.  carvedilol (COREG) 6.25 mg tablet Take 1 Tab by mouth two (2) times daily (with meals).  anastrozole (ARIMIDEX) 1 mg tablet Take 1 Tab by mouth daily.  docusate sodium (COLACE) 100 mg capsule Take 1 Cap by mouth two (2) times a day for 90 days.  furosemide (LASIX) 20 mg tablet Take one pill daily by mouth .  L. acidoph & paracasei- S therm- Bifido (BERYL-Q/RISAQUAD) 8 billion cell cap cap Take 1 Cap by mouth daily.  spironolactone (ALDACTONE) 25 mg tablet Take 1 Tab by mouth daily.  polyethylene glycol (MIRALAX) 17 gram packet Take 1 Packet by mouth daily.  phosphorus (K PHOS NEUTRAL) 250 mg tablet Take 1 Tab by mouth two (2) times a day.  lisinopril (PRINIVIL, ZESTRIL) 2.5 mg tablet Take 1 Tab by mouth daily.  epoetin celio (EPOGEN;PROCRIT) 10,000 unit/mL injection 1 mL by SubCUTAneous route every Monday, Wednesday, Friday. Indications: ANEMIA DUE TO RENAL FAILURE    glimepiride (AMARYL) 1 mg tablet Take 1 mg by mouth every morning.  levothyroxine (SYNTHROID) 88 mcg tablet Take 88 mcg by mouth Daily (before breakfast).  etanercept (ENBREL) 50 mg/mL (0.98 mL) injection 1 mL by SubCUTAneous route every seven (7) days.  evolocumab (REPATHA SURECLICK) pen injection 1 mL by SubCUTAneous route every fourteen (14) days.     folic acid (FOLVITE) 1 mg tablet TAKE ONE TABLET BY MOUTH DAILY    sitaGLIPtin (JANUVIA) 25 mg tablet Take 50 mg by mouth daily.  VIT C/VIT E/LUTEIN/MIN/OMEGA-3 (OCUVITE PO) Take 1 Tab by mouth daily.  MAGNESIUM MALATE Take 400 mg by mouth daily.  MULTIVITAMIN WITH MINERALS (HAIR,SKIN & NAILS PO) Take 1 Tab by mouth daily.  aspirin delayed-release 81 mg tablet Take 81 mg by mouth daily.  OMEGA-3 FATTY ACIDS/FISH OIL (OMEGA 3 FISH OIL PO) Take 300 mg by mouth daily.  ascorbate calcium (MAKAYLA-C) 500 mg Tab Take 1,000 mg by mouth daily.  CHOLECALCIFEROL, VITAMIN D3, (VITAMIN D-3 PO) Take 1,000 Units by mouth daily. Review of Systems:  (bold if positive, if negative)    As in HPI - rest of ROS noncontributory      Physical Exam:  Visit Vitals    BP (!) 170/94    Pulse (!) 130    SpO2 98%         Telemetry:     Gen: Elderly, in resp distress, on BIPAP  HEENT:  Pale conjunctivae, hearing intact to voice,   Neck: Supple,Difficult to assess JVD - on BIPAP  Resp: Bilat coarse BS+/ Rhonchi+  Card:  Tachy, Reg Rythm,Normal S1, S2, 2/6 sys murmur,  Abd:  Soft, BS+  MSK: No cyanosis   Skin: No rashes   Neuro:  moving all four extremities, no focal deficit, follows commands appropriately  LE: No edema  Vascular:Radial Pulses 2+ and symmetric        EKG: Sinus Tach, Ant MI au  -Lat T wave inversion, - when compared to EKG of 19 May 2018- Q waves also noted in V3/V4 in addition to VI1/V2 ; ST elevated in V2/V3 when compared to T wave inversion on prior EKG    Cxray:    LABS:        Lab Results   Component Value Date/Time    WBC 10.1 06/01/2018 03:45 AM    HGB 11.9 06/01/2018 03:45 AM    HCT 40.1 06/01/2018 03:45 AM    PLATELET 941 (H) 14/43/7259 03:45 AM    MCV 91.3 06/01/2018 03:45 AM     Lab Results   Component Value Date/Time    Sodium 135 (L) 05/25/2018 12:22 AM    Potassium 4.3 05/25/2018 12:22 AM    Chloride 101 05/25/2018 12:22 AM    CO2 26 05/25/2018 12:22 AM    Anion gap 8 05/25/2018 12:22 AM    Glucose 200 (H) 05/25/2018 12:22 AM    BUN 24 (H) 05/25/2018 12:22 AM    Creatinine 1.19 (H) 05/25/2018 12:22 AM    BUN/Creatinine ratio 20 05/25/2018 12:22 AM    GFR est AA 53 (L) 05/25/2018 12:22 AM    GFR est non-AA 44 (L) 05/25/2018 12:22 AM    Calcium 8.9 05/25/2018 12:22 AM     Lab Results   Component Value Date/Time    CK 41 04/22/2018 07:35 AM    CK-MB Index 2.9 (H) 04/22/2018 07:35 AM    Troponin-I, Qt. 13.90 (H) 05/21/2018 04:42 AM     Lab Results   Component Value Date/Time    aPTT 34.1 (H) 05/19/2018 03:10 AM     Lab Results   Component Value Date/Time    INR 1.1 05/19/2018 03:10 AM    Prothrombin time 11.4 (H) 05/19/2018 03:10 AM     No results found for: BNP, BNPP, XBNPT    CC time 30 min    Daniela Altamirano MD

## 2018-06-01 NOTE — PROGRESS NOTES
Hospitalist Progress Note  Radha Gomez MD  Answering service: [phone] from in house phone  Cell: [phone]      Date of Service:  2018  NAME:  Samira Orozco  :  1935  MRN:  [id number]      Admission Summary: This is an 81-year-old woman with a past medical history significant for coronary artery disease, chronic systolic congestive heart failure, metastatic left breast cancer, hypertension, type 2 diabetes, hypothyroidism, rheumatoid arthritis, dyslipidemia, chronic kidney disease, colon cancer, was in her usual state of health until the day of her presentation at the emergency room when the patient developed shortness of breath and chest pain at the rehab facility where the patient is presently receiving rehabilitation. The symptoms started 2 hours before coming to the emergency room. This shortness of breath is with little or no activities. The chest pain is located at the center of the chest with talc radiation. No known aggravating or relieving factors. The chest pain is 10/10 in severity, sharp and constant. At the onset of the patient's symptoms, there was concern for ST elevation myocardial infarction. The emergency room department was alerted. When the patient arrived at the emergency room, code STEMI was called. The protocol for code STEMI was followed. The cardiology came to the emergency room. The cardiology is of the opinion that the patient has a non-ST elevation myocardial infarction and advised admission to the hospitalist service. Patient was also found to be in acute on chronic systolic congestive heart failure.       Interval history / Subjective:      He feels better, no chest pain,     Assessment & Plan:     NSTEMI, severe multivessel CAD with recent ACS/ NSTEMI  -on heparin gtt, aspirin, plavix, coreg and prn nitroglycerine, oxygen support  -not a surgical candidate  -troponin 1.44  -echo LV EF 30% no obvious wall motion abnormalitis  -cardiologist on board    Acute on chronic systolic CHF NYHA Class IV  -repeated echo on 6/1 LV EF 30%  -Chest x ray New moderate interstitial pulmonary edema. Unchanged small left pleural effusion  -probnp 4165  -continue lasix IV and coreg,   -hold lisinopril and spironolactone  -monitor I/O and daily weight  -cardiologist on board    Acute on chronic hypercapnic hypoxic respiratory failure due to CHF   -pH7. 33 pCO2 51 pO2 276 on FiO2 100%  -improved, off BiPAP this morning, on lasix, oxygen support, monitor pulse ox    Metastatic left breast cancer   -stable, continue arimidex    HTN  -BP normal, continue coreg, lisinopril, lasix, spironolactone and monitor BP    DM-II with hyperglycemia   -On sliding scale, monitor finger stick glucose    Hypothyroidism  -continue sythroid    CKD stage III  -creatinine slightly up  -avoid nephrotoxine  -monitor renal function    Mild hyponatremia   -BMP in am    Mild hyperkalemia   -repeat k level  -hold spironolactone and lisinopril  -will give low potassium diet, if it gets worse,  -hold lisinoprile and spironolactone    Hyperphosphatemia   -repeat phosphorus level    Anemia of chronic disease   -H/H stable    Hx of RA  -stable  -Received outpatient etanercept    Lower sacrum & gluteal cleft chronic wound POA  -wound care on board    DNR, at risk for decompensation      Code status: DNR   DVT prophylaxis: on heparin    Care Plan discussed with: Patient/Family and Nurse  Disposition: From [facility] giver at bedside, questions answered   I called her son, CORINNE Kelly [phone] and updated her clinical condition and answered his questions.   Update son every day     Hospital Problems  Date Reviewed: 6/1/2018          Codes Class Noted POA    * (Principal)NSTEMI (non-ST elevated [facility] infarction) [facility] ICD-10-CM: I21.4  ICD-9-CM: 410.70  6/1/2018 Yes                Vital Signs:    Last 24hrs VS reviewed since prior progress note. Most recent are:  Visit Vitals    BP 96/44    Pulse 87    Resp 17    Ht 5' 1\" (1.549 m)    Wt 56.5 kg (124 lb 8 oz)    SpO2 100%    BMI 23.52 kg/m2       No intake or output data in the 24 hours ending 06/01/18 0808     Physical Examination:             Constitutional:  No acute distress, cooperative, pleasant    ENT:  Oral mucous moist, oropharynx benign. Neck supple,    Resp:  Decreased bronchial breath sound bilaterally. No wheezing/rhonchi/rales. No accessory muscle use   CV:  Regular rhythm, normal rate, no murmurs, gallops, rubs    GI:  Soft, non distended, non tender. normoactive bowel sounds, no hepatosplenomegaly     Musculoskeletal:  No edema    Neurologic:  Moves all extremities. AAOx3, CN II-XII reviewed     Skin:  Good turgor, no rashes or ulcers       Data Review:    Review and/or order of clinical lab test      Labs:     Recent Labs      06/01/18 0345   WBC  10.1   HGB  11.9   HCT  40.1   PLT  424*     Recent Labs      06/01/18 0646  06/01/18 0345   NA   --   135*   K   --   5.5*   CL   --   101   CO2   --   22   BUN   --   31*   CREA   --   1.29*   GLU   --   349*   CA   --   9.1   MG  1.8   --    PHOS  5.6*   --      Recent Labs      06/01/18 0345   SGOT  46*   ALT  29   AP  804*   TBILI  0.3   TP  9.1*   ALB  2.8*   GLOB  6.3*     Recent Labs      06/01/18 0646   APTT  57.3*      No results for input(s): FE, TIBC, PSAT, FERR in the last 72 hours. No results found for: FOL, RBCF   No results for input(s): PH, PCO2, PO2 in the last 72 hours.   Recent Labs      06/01/18 0646  06/01/18 0345   CPK  97   --    CKNDX  5.4*   --    TROIQ  1.44*  0.73*     Lab Results   Component Value Date/Time    Cholesterol, total 74 04/16/2018 04:21 AM    HDL Cholesterol 25 04/16/2018 04:21 AM    LDL, calculated 31.6 04/16/2018 04:21 AM    Triglyceride 87 04/16/2018 04:21 AM    CHOL/HDL Ratio 3.0 04/16/2018 04:21 AM     Lab Results   Component Value Date/Time    Glucose (POC) 369 (H) 06/01/2018 05:56 AM    Glucose (POC) 182 (H) 05/25/2018 04:53 PM    Glucose (POC) 286 (H) 05/25/2018 11:29 AM    Glucose (POC) 128 (H) 05/25/2018 06:30 AM    Glucose (POC) 308 (H) 05/24/2018 10:02 PM     Lab Results   Component Value Date/Time    Color YELLOW/STRAW 05/19/2018 02:58 AM    Appearance CLOUDY (A) 05/19/2018 02:58 AM    Specific gravity 1.015 05/19/2018 02:58 AM    pH (UA) 6.0 05/19/2018 02:58 AM    Protein 30 (A) 05/19/2018 02:58 AM    Glucose 500 (A) 05/19/2018 02:58 AM    Ketone NEGATIVE  05/19/2018 02:58 AM    Bilirubin NEGATIVE  05/19/2018 02:58 AM    Urobilinogen 0.2 05/19/2018 02:58 AM    Nitrites NEGATIVE  05/19/2018 02:58 AM    Leukocyte Esterase MODERATE (A) 05/19/2018 02:58 AM    Epithelial cells FEW 05/19/2018 02:58 AM    Bacteria NEGATIVE  05/19/2018 02:58 AM    WBC 20-50 05/19/2018 02:58 AM    RBC 10-20 05/19/2018 02:58 AM         Medications Reviewed:     Current Facility-Administered Medications   Medication Dose Route Frequency    furosemide (LASIX) 10 mg/mL injection        heparin (porcine) 5,000 unit/mL injection        heparin 25,000 units in D5W 250 ml infusion  12-25 Units/kg/hr IntraVENous TITRATE    nitroglycerin (NITROBID) 2 % ointment 1 Inch  1 Inch Topical Q6H    acetaminophen (TYLENOL) tablet 650 mg  650 mg Oral Q4H PRN    oxyCODONE-acetaminophen (PERCOCET) 5-325 mg per tablet 1 Tab  1 Tab Oral Q4H PRN    anastrozole (ARIMIDEX) tablet 1 mg  1 mg Oral DAILY    aspirin delayed-release tablet 81 mg  81 mg Oral DAILY    carvedilol (COREG) tablet 6.25 mg  6.25 mg Oral BID WITH MEALS    clopidogrel (PLAVIX) tablet 75 mg  75 mg Oral DAILY    docusate sodium (COLACE) capsule 100 mg  100 mg Oral BID    . PHARMACY TO SUBSTITUTE PER PROTOCOL    Per Protocol    . PHARMACY TO SUBSTITUTE PER PROTOCOL    Per Protocol    folic acid (FOLVITE) tablet 1 mg  1 mg Oral DAILY    . PHARMACY TO SUBSTITUTE PER PROTOCOL    Per Protocol    lactobac sylvia& pc-s.donya.anim (BERYL Q/RISAQUAD)  1 Cap Oral DAILY    levothyroxine (SYNTHROID) tablet 88 mcg  88 mcg Oral ACB    lisinopril (PRINIVIL, ZESTRIL) tablet 2.5 mg  2.5 mg Oral DAILY    spironolactone (ALDACTONE) tablet 25 mg  25 mg Oral DAILY    sodium chloride (NS) flush 5-10 mL  5-10 mL IntraVENous Q8H    sodium chloride (NS) flush 5-10 mL  5-10 mL IntraVENous PRN    ondansetron (ZOFRAN) injection 4 mg  4 mg IntraVENous Q4H PRN    insulin lispro (HUMALOG) injection   SubCUTAneous AC&HS    glucose chewable tablet 16 g  4 Tab Oral PRN    dextrose (D50W) injection syrg 12.5-25 g  12.5-25 g IntraVENous PRN    glucagon (GLUCAGEN) injection 1 mg  1 mg IntraMUSCular PRN    furosemide (LASIX) injection 40 mg  40 mg IntraVENous Q12H    fentaNYL citrate (PF) injection 12.5 mcg  12.5 mcg IntraVENous Q4H PRN    balsam peru-castor oil (VENELEX)  mg/gram ointment   Topical BID     ______________________________________________________________________  EXPECTED LENGTH OF STAY: - - -  ACTUAL LENGTH OF STAY:          0                 Avila Rutledge MD

## 2018-06-01 NOTE — PROGRESS NOTES
1221 TRANSFER - IN REPORT:    Verbal report received from Tiny (name) on Krissy Age  being received from Leonora (unit) for routine progression of care      Report consisted of patients Situation, Background, Assessment and   Recommendations(SBAR). Information from the following report(s) SBAR, Kardex, Procedure Summary, Intake/Output, MAR, Recent Results and Cardiac Rhythm NSR was reviewed with the receiving nurse. Opportunity for questions and clarification was provided. Assessment completed upon patients arrival to unit and care assumed. Primary Nurse Bossman Funez RN and ARIANA Ruiz performed a dual skin assessment on this patient Impairment noted- see wound doc flow sheet  Calderon score is 14    Small open area to sacrum    Patient resting on Total care bed. Son updated regarding plan of care. Labs drawn and sent. 0730 Bedside shift change report given to Norberto Pressley (oncoming nurse) by Leonora (offgoing nurse). Report included the following information SBAR, Kardex, ED Summary, Procedure Summary, Intake/Output, MAR, Recent Results and Cardiac Rhythm NSR.

## 2018-06-01 NOTE — CONSULTS
Palliative Medicine Consult  [place]: [phone] ([phone])    Patient Name: Tamie Moreland  YOB: 1935    Date of Initial Consult: 18  Reason for Consult: care decisions  Requesting Provider: Dr. Jose Alfredo Smith  Primary Care Physician: Rahat Jean MD     SUMMARY:   Tamie Moreland is a 82 y.o. with a past history of rheumatoid arthritis, CKD stage 3, DM w neuropathy, hx colon cancer 26 years ago,  who was admitted on 2018 from home  with a diagnosis of acute chest pain. Current medical issues leading to Palliative Medicine involvement include: patient with metastatic breast cancer on aromatase inhibitors  + bone mets, recent hospitalization at [facility] ICU -, now being treated for NSTEMI    Patient and her  moved to [place] in  from [facility]. Patient's  of over 48 years  in 2017 of pancreatic cancer.  (w hospice services). Patient lives alone, but has paid caregivers. She has 3 adult sons-- son Joan Louis (oldest son), is [facility] [phone]       PALLIATIVE DIAGNOSES:   1. Chest pain- chronic untreatable CAD  2. CHF, NSTEMI  3. Anemia  4. CKD        PLAN:   1. Met with patient who was resting comfortably. She feels a lot better since coming to the hospital. She panicked this morning with the chest pain and shortness of breath. 2. She feels well now. Sh understands she has extensive heart disease and breast cancer. She differs all decision making to her son Dougie Morton who is in [place] at this time. 3. Spoke with Joan Louis at length- he understands mom's heart disease cannot be fixed but he does prefer that her \"mini heart attacks\" be treated in the hospital. He feels their outpatient cardiologist supports this and has advised mom to be admitted for the same. 4. Breast cancer- Dougie Morton tells me that per Dr. Roz Fan- her oncologist, 50 % of patients on Arimidex in this age group can live for 2-3 years.  Plans to follow up in 2 weeks at which time repeat scans are expected. Stalin Kevin wants to continue current course of treatment as long as possible. He does not think re hospitalization is burdensome to patient or family. 5. He knows our team is here to support and will reach out to us in the future if need be. 6. Goals are clear at this time. We will follow peripherally. Please call us prn. GOALS OF CARE / TREATMENT PREFERENCES:     GOALS OF CARE:  Patient/Health Care Proxy Stated Goals: Prolong life  Awaiting pathology, and palliative treatment options    TREATMENT PREFERENCES:   Code Status: DNR    Advance Care Planning:  Advance Care Planning 6/1/2018   Patient's Healthcare Decision Maker is: Legal Next of Kin   Primary Decision Maker Name Creed Loges   Primary Decision Maker Phone Number [phone]   Primary Decision Maker Relationship to Patient Adult child   Confirm Advance Directive Yes, on file           Other Instructions: Other:    As far as possible, the palliative care team has discussed with patient / health care proxy about goals of care / treatment preferences for patient. HISTORY:       HPI/SUBJECTIVE:    The patient is:   [] Verbal and participatory  [x] Non-participatory due to:     82year old female, found to have possible metastatic disease in base of skull by Head CT in March 2018, who was admitted with report of worsening SOB.      .     Clinical Pain Assessment (nonverbal scale for severity on nonverbal patients):   Clinical Pain Assessment  Severity: 0          Duration: for how long has pt been experiencing pain (e.g., 2 days, 1 month, years)  Frequency: how often pain is an issue (e.g., several times per day, once every few days, constant)     FUNCTIONAL ASSESSMENT:     Palliative Performance Scale (PPS):  PPS: 70       PSYCHOSOCIAL/SPIRITUAL SCREENING:     Palliative IDT has assessed this patient for cultural preferences / practices and a referral made as appropriate to needs (Cultural Services, Patient Advocacy, Ethics, etc.)    Advance Care Planning:  Advance Care Planning 6/1/2018   Patient's Healthcare Decision Maker is: Legal Next of Bentley Fernandez   Primary Decision Maker Name Cynthia Jara   Primary Decision Maker Phone Number [phone]   Primary Decision Maker Relationship to Patient Adult child   Confirm Advance Directive Yes, on file       Any spiritual / Jew concerns:  [] Yes /  [x] No    Caregiver Burnout:  [] Yes /  [x] No /  [] No Caregiver Present      Anticipatory grief assessment:   [x] Normal  / [] Maladaptive       ESAS Anxiety: Anxiety: 0    ESAS Depression: Depression: 0 did not answer        REVIEW OF SYSTEMS:     Positive and pertinent negative findings in ROS are noted above in HPI. The following systems were [] reviewed / [x] unable to be reviewed as noted in HPI  Other findings are noted below. Patient has delirium so is unable to report her symptoms in full   Systems: constitutional, ears/nose/mouth/throat, respiratory, gastrointestinal, genitourinary, musculoskeletal, integumentary, neurologic, psychiatric, endocrine. Positive findings noted below. Modified ESAS Completed by: provider   Fatigue: 2 Drowsiness: 0   Depression: 0 Pain: 0   Anxiety: 0 Nausea: 0   Anorexia: 0 Dyspnea: 1     Constipation: No              PHYSICAL EXAM:     From RN flowsheet:  Wt Readings from Last 3 Encounters:   06/01/18 124 lb 8 oz (56.5 kg)   05/25/18 120 lb 2.4 oz (54.5 kg)   05/11/18 136 lb (61.7 kg)     Blood pressure 92/40, pulse 85, temperature 98.9 °F (37.2 °C), resp. rate 21, height 5' 1\" (1.549 m), weight 124 lb 8 oz (56.5 kg), SpO2 98 %. Pain Scale 1: Numeric (0 - 10)  Pain Intensity 1: 0                 Last bowel movement, if known:     Constitutional: awake, alert to person, only smiles and shakes head when I talk to her.    Eyes: Clear sclera  ENT: Nares patent, moist MM, off BIPAP for now  Resp: Unlabored at rest         HISTORY:     Principal Problem:    NSTEMI (non-ST elevated myocardial infarction) ([facility] (2018)      Past Medical History:   Diagnosis Date    Anemia 2009    Colon cancer ([facility] 2009    surgery/chemo    Colon cancer ([facility] 2009    DM (diabetes mellitus) ([facility] 2009    GERD (gastroesophageal reflux disease)     H/O: CVA 2009    slight l sided weakness    Hypothyroid 2009    Ill-defined condition     seasonal allergies    Microalbuminuria 2009    Osteoporosis 2009    Other and unspecified hyperlipidemia 2010    Rheumatoid arthritis involving ankle ([facility] 2016    Rheumatoid arthritis(714.0) 2009    Unspecified essential hypertension 2009    Weakness due to cerebrovascular accident       Past Surgical History:   Procedure Laterality Date    HX COLECTOMY      HX HYSTERECTOMY      HX OTHER SURGICAL      colonoscopies numerous since       Family History   Problem Relation Age of Onset    Diabetes Mother     Stroke Mother     Diabetes Sister     Other Sister      fell and hit her head -  of this   [facility] Diabetes Brother     Cancer Father      stomach    Diabetes Sister       History reviewed, no pertinent family history.   Social History   Substance Use Topics    Smoking status: Never Smoker    Smokeless tobacco: Never Used    Alcohol use No     Allergies   Allergen Reactions    Statins-Hmg-Coa Reductase Inhibitors Other (comments)     Intolerant to statins     Sulfa (Sulfonamide Antibiotics) Other (comments)     syncope      Current Facility-Administered Medications   Medication Dose Route Frequency    heparin 25,000 units in D5W 250 ml infusion  12-25 Units/kg/hr IntraVENous TITRATE    acetaminophen (TYLENOL) tablet 650 mg  650 mg Oral Q4H PRN    oxyCODONE-acetaminophen (PERCOCET) 5-325 mg per tablet 1 Tab  1 Tab Oral Q4H PRN    anastrozole (ARIMIDEX) tablet 1 mg  1 mg Oral DAILY    aspirin delayed-release tablet 81 mg  81 mg Oral DAILY    clopidogrel (PLAVIX) tablet 75 mg  75 mg Oral DAILY    docusate sodium (COLACE) capsule 100 mg  100 mg Oral BID    . PHARMACY TO SUBSTITUTE PER PROTOCOL    Per Protocol    . PHARMACY TO SUBSTITUTE PER PROTOCOL    Per Protocol    folic acid (FOLVITE) tablet 1 mg  1 mg Oral DAILY    . PHARMACY TO SUBSTITUTE PER PROTOCOL    Per Protocol    lactobac ac& pc-s.therm-b.anim (BERYL Q/RISAQUAD)  1 Cap Oral DAILY    levothyroxine (SYNTHROID) tablet 88 mcg  88 mcg Oral ACB    sodium chloride (NS) flush 5-10 mL  5-10 mL IntraVENous Q8H    sodium chloride (NS) flush 5-10 mL  5-10 mL IntraVENous PRN    ondansetron (ZOFRAN) injection 4 mg  4 mg IntraVENous Q4H PRN    insulin lispro (HUMALOG) injection   SubCUTAneous AC&HS    glucose chewable tablet 16 g  4 Tab Oral PRN    dextrose (D50W) injection syrg 12.5-25 g  12.5-25 g IntraVENous PRN    glucagon (GLUCAGEN) injection 1 mg  1 mg IntraMUSCular PRN    fentaNYL citrate (PF) injection 12.5 mcg  12.5 mcg IntraVENous Q4H PRN    [START ON 6/2/2018] furosemide (LASIX) injection 40 mg  40 mg IntraVENous DAILY    carvedilol (COREG) tablet 3.125 mg  3.125 mg Oral BID WITH MEALS          LAB AND IMAGING FINDINGS:     Lab Results   Component Value Date/Time    WBC 10.1 06/01/2018 03:45 AM    HGB 11.9 06/01/2018 03:45 AM    PLATELET 377 (H) 18/06/1153 03:45 AM     Lab Results   Component Value Date/Time    Sodium 135 (L) 06/01/2018 03:45 AM    Potassium 5.5 (H) 06/01/2018 03:45 AM    Chloride 101 06/01/2018 03:45 AM    CO2 22 06/01/2018 03:45 AM    BUN 31 (H) 06/01/2018 03:45 AM    Creatinine 1.29 (H) 06/01/2018 03:45 AM    Calcium 9.1 06/01/2018 03:45 AM    Magnesium 1.8 06/01/2018 06:46 AM    Phosphorus 5.6 (H) 06/01/2018 06:46 AM      Lab Results   Component Value Date/Time    AST (SGOT) 46 (H) 06/01/2018 03:45 AM    Alk.  phosphatase 804 (H) 06/01/2018 03:45 AM    Protein, total 9.1 (H) 06/01/2018 03:45 AM    Albumin 2.8 (L) 06/01/2018 03:45 AM    Globulin 6.3 (H) 06/01/2018 03:45 AM     Lab Results   Component Value Date/Time    INR 1.1 05/19/2018 03:10 AM    Prothrombin time 11.4 (H) 05/19/2018 03:10 AM    aPTT 54.7 (H) 06/01/2018 12:51 PM      Lab Results   Component Value Date/Time    Iron 12 (L) 04/19/2018 06:09 PM    TIBC 232 (L) 04/19/2018 06:09 PM    Iron % saturation 5 (L) 04/19/2018 06:09 PM    Ferritin 424 (H) 04/19/2018 06:09 PM      No results found for: PH, PCO2, PO2  No components found for: [place]   Lab Results   Component Value Date/Time    CK 97 06/01/2018 06:46 AM    CK - MB 5.2 (H) 06/01/2018 06:46 AM                Total time: 15 min   Counseling / coordination time, spent as noted above: 10 min   > 50% counseling / coordination?: yes    Prolonged service was provided for  []30 min   []75 min in face to face time in the presence of the patient, spent as noted above. Time Start:   Time End:   Note: this can only be billed with 26939 (initial) or 97184 (follow up). If multiple start / stop times, list each separately.

## 2018-06-01 NOTE — PROGRESS NOTES
1930 Bedside and Verbal shift change report given to [address] [place] [address] (oncoming nurse) by Ada Hardin (offgoing nurse). Report included the following information SBAR, Kardex, Intake/Output, MAR, Recent Results and Cardiac Rhythm NSR. Pt denies c/p or SOB  Son here at bedside, brought in some meds from home, and sent down to pharmacy for verification    2010 PTT resulted 90.1. Heparin drip stopped x1H then restarted at 10units/kg/hr    2030 sleeping    0700 slept well through the night  0800 Bedside and Verbal shift change report given to [address] [place] [address] (oncoming nurse) by [address] [place] [address] (offgoing nurse). Report included the following information SBAR, Kardex, Intake/Output, MAR, Recent Results and Cardiac Rhythm NSR.

## 2018-06-01 NOTE — CDMP QUERY
Please clarify & documentation the Present on Admission (POA) status for: Lower sacrum & gluteal cleft chronic wound per pt report likely mixed etiology of pressure and moisture     The medical record reflects the following clinical findings, treatment, and risk factors:    Risk Factors: Debility    Clinical Indicators:  6/1 WOCN  Bilateral heel skin intact and without erythema. 1. POA, lower sacrum & gluteal cleft chronic wound per pt report likely mixed etiology of pressure and moisture = 2 x 0.5 x 0.2cm  90% moist yellow/white 10% central pink. no exudate. Periwound intact & with scattered hypopimented tissue. Duoderm applied. Treatment: WOCN consult    Please clarify and document your clinical opinion in the progress notes and discharge summary including the definitive and/or presumptive diagnosis, (suspected or probable), related to the above clinical findings. Please include clinical findings supporting your diagnosis.     Thank you  Alba Duque  [facility]  [phone]

## 2018-06-01 NOTE — PROGRESS NOTES
Cardiology Progress Note            Admit Date: 6/1/2018  Admit Diagnosis: NSTEMI (non-ST elevated myocardial infarction) [facility])  Date: 6/1/2018     Time: 9:25 AM    HPI: This is an 51-year-old woman who presented from [facility] with chief c/o of chest pain and SOB. She has PMH of severe 3 vessel CAD (not surgical candidate and CAD not amenable to PCI), chronic systolic CHF, HTN, HLD (intolerant to statins),  metastatic left breast cancer,  type 2 diabetes, hypothyroidism, RA, CKD, colon cancer. Midsternal sharp Chest pain and SOB started 2 hours before coming to the ER. Code STEMI called d/t EKG findings but upon further review of EKG by Dr. Edmundo Purvis, determined to have NSTEMI. ACS heparin started, transdermal nitro also started. Also noted to have acute on chronic CHF- noted to have pulmonary edema and unchanged Left pleural effusion on CXR, was placed on bipap, received 80 mg IV lasix. Cardiology following. Assessment and Plan     1. NSTEMI/CAD: Troponin 1.44. ]-Chest pain resolved. -12 lead EKG: ST elevation in V2/3 but in setting of q waves V3/4, V1/V2.   -Not candidate for CABG or intervention.  -Continue heparin infusion for now.  -Continue ASA, plavix, BB, nitropaste and repatha. No statin due to intolerance in past.    2. Hx of CAD:  Severe 3 vessel dz  -ASA, plavix, BB, repatha. No ACE-I d/t elevated creatinine. 3. Acute on chronic systolic CHF: NYHA class IV. -PRO BNP 4165  - EF 45% on TTE 4/26/18 but suspect is lower.  -Repeat TTE pending  -Continue Coreg 6.25 mg BID  -Hold lasix and aldactone for now.  -Hold Lisinopril d/t creatinine elevated from baseline.  -Daily weight and I/Os: received 40 lasix IV early a.m. Will hold off on additional lasix this a.m and re-assess this afternoon as U/O decreased, bladder scan 150ml this a.m. And SOB resolved- trial off biPAP    4.  CKD: creatinine 1.29   -Hold lisinopril and aldactone. Hold lasix this a.m. As above  -Follow renal function closely    5. Hyperkalemia:  K=5.5.  -Hold aldactone for now. 6. Hx of HTN:  BP low normal:    -holding lisinopril, aldactone as above. Continue coreg. 7. Hx of metastatic breast cancer (current) and colon ca. -On arimedex. 8. Hx of HLD:  Resume repatha    9. Hx of hypothyroid:  On synthroid    10. Advanced directives:  DNR  -Palliative care consult. Pt with hx of metastatic breast CA, severe 3 vessel CAD not amenable to PCI and CHF. Pt now presents again with NSTEMI and acute on chronic CHF. Unfortunately, pt is not a candidate for CABG. Continue to pursue medical management. Will follow up TTE. Hold diuretics this a.m. And reassess this afternoon as creatinine rising, symptoms improving and U/O decreased- pt has been sensitive to diuretics in past.   Palliative care consult recommended. Cardiology attending: seen and examined. Agree with assess and plan  Have reviewed recent cath films and she is candidate for medical therapy only. Subjective:   Sandy Ledesma denies chest pain, SOB. States she feels better, wants Bipap mask off    Objective:      Physical Exam:                Visit Vitals    BP 96/44    Pulse 87    Resp 17    Ht 5' 1\" (1.549 m)    Wt 56.5 kg (124 lb 8 oz)    SpO2 100%    BMI 23.52 kg/m2          General Appearance:   Well developed, frail, alert individual in no acute distress. Ears/Nose/Mouth/Throat:    Hearing grossly normal. BiPAP mask on          Neck:  Supple. Chest:    Lungs diminished bases, no use of accessory muscles. No wheeze. Cardiovascular:   Regular rate and rhythm, S1, S2 normal, no murmur. Abdomen:    Soft, non-tender, bowel sounds are active. Extremities:  No BLE edema. Skin:  Warm and dry.      Telemetry: NSR          Data Review:    Labs:    Recent Results (from the past 24 hour(s))   EKG, 12 LEAD, INITIAL    Collection Time: 06/01/18  3:35 AM   Result Value Ref Range    Ventricular Rate 132 BPM    Atrial Rate 132 BPM    P-R Interval 130 ms    QRS Duration 110 ms    Q-T Interval 302 ms    QTC Calculation (Bezet) 447 ms    Calculated P Axis 65 degrees    Calculated R Axis 57 degrees    Calculated T Axis -130 degrees    Diagnosis       Sinus tachycardia  Septal infarct (cited on or before 24-APR-2018)  ST & T wave abnormality, consider inferolateral ischemia  When compared with ECG of 20-MAY-2018 17:32,  Vent. rate has increased BY  46 BPM  QRS duration has increased  Questionable change in initial forces of Anteroseptal leads  ST now depressed in Inferior leads  ST elevation now present in Anterior leads  T wave inversion now evident in Inferior leads     TROPONIN I    Collection Time: 06/01/18  3:45 AM   Result Value Ref Range    Troponin-I, Qt. 0.73 (H) <2.64 ng/mL   METABOLIC PANEL, COMPREHENSIVE    Collection Time: 06/01/18  3:45 AM   Result Value Ref Range    Sodium 135 (L) 136 - 145 mmol/L    Potassium 5.5 (H) 3.5 - 5.1 mmol/L    Chloride 101 97 - 108 mmol/L    CO2 22 21 - 32 mmol/L    Anion gap 12 5 - 15 mmol/L    Glucose 349 (H) 65 - 100 mg/dL    BUN 31 (H) 6 - 20 MG/DL    Creatinine 1.29 (H) 0.55 - 1.02 MG/DL    BUN/Creatinine ratio 24 (H) 12 - 20      GFR est AA 48 (L) >60 ml/min/1.73m2    GFR est non-AA 40 (L) >60 ml/min/1.73m2    Calcium 9.1 8.5 - 10.1 MG/DL    Bilirubin, total 0.3 0.2 - 1.0 MG/DL    ALT (SGPT) 29 12 - 78 U/L    AST (SGOT) 46 (H) 15 - 37 U/L    Alk.  phosphatase 804 (H) 45 - 117 U/L    Protein, total 9.1 (H) 6.4 - 8.2 g/dL    Albumin 2.8 (L) 3.5 - 5.0 g/dL    Globulin 6.3 (H) 2.0 - 4.0 g/dL    A-G Ratio 0.4 (L) 1.1 - 2.2     CBC W/O DIFF    Collection Time: 06/01/18  3:45 AM   Result Value Ref Range    WBC 10.1 3.6 - 11.0 K/uL    RBC 4.39 3.80 - 5.20 M/uL    HGB 11.9 11.5 - 16.0 g/dL    HCT 40.1 35.0 - 47.0 %    MCV 91.3 80.0 - 99.0 FL    MCH 27.1 26.0 - 34.0 PG    MCHC 29.7 (L) 30.0 - 36.5 g/dL    RDW 19.6 (H) 11.5 - 14.5 %    PLATELET 260 (H) 150 - 400 K/uL    MPV 9.6 8.9 - 12.9 FL    NRBC 0.0 0  WBC    ABSOLUTE NRBC 0.00 0.00 - 0.01 K/uL   NT-PRO BNP    Collection Time: 06/01/18  3:47 AM   Result Value Ref Range    NT pro-BNP 4165 (H) 0 - 450 PG/ML   POC G3 - PUL    Collection Time: 06/01/18  3:54 AM   Result Value Ref Range    FIO2 (POC) 100 %    pH (POC) 7.330 (L) 7.35 - 7.45      pCO2 (POC) 51.4 (H) 35.0 - 45.0 MMHG    pO2 (POC) 276 (H) 80 - 100 MMHG    HCO3 (POC) 27.1 (H) 22 - 26 MMOL/L    sO2 (POC) 100 (H) 92 - 97 %    Base excess (POC) 1 mmol/L    Site LEFT RADIAL      Device: BIPAP      PEEP/CPAP (POC) 7 cmH2O    PIP (POC) 18      Allens test (POC) YES      Specimen type (POC) ARTERIAL      Spontaneous timed YES     POC TROPONIN-I    Collection Time: 06/01/18  3:54 AM   Result Value Ref Range    Troponin-I (POC) <0.04 0.00 - 0.08 ng/mL   GLUCOSE, POC    Collection Time: 06/01/18  5:56 AM   Result Value Ref Range    Glucose (POC) 369 (H) 65 - 100 mg/dL    Performed by Antony Alfredo    MAGNESIUM    Collection Time: 06/01/18  6:46 AM   Result Value Ref Range    Magnesium 1.8 1.6 - 2.4 mg/dL   PHOSPHORUS    Collection Time: 06/01/18  6:46 AM   Result Value Ref Range    Phosphorus 5.6 (H) 2.6 - 4.7 MG/DL   CK W/ CKMB & INDEX    Collection Time: 06/01/18  6:46 AM   Result Value Ref Range    CK 97 26 - 192 U/L    CK - MB 5.2 (H) <3.6 NG/ML    CK-MB Index 5.4 (H) 0 - 2.5     TSH 3RD GENERATION    Collection Time: 06/01/18  6:46 AM   Result Value Ref Range    TSH 2.31 0.36 - 3.74 uIU/mL   TROPONIN I    Collection Time: 06/01/18  6:46 AM   Result Value Ref Range    Troponin-I, Qt. 1.44 (H) <0.05 ng/mL   PTT    Collection Time: 06/01/18  6:46 AM   Result Value Ref Range    aPTT 57.3 (H) 22.1 - 32.0 sec    aPTT, therapeutic range     58.0 - 77.0 SECS          Radiology:        Current Facility-Administered Medications   Medication Dose Route Frequency    furosemide (LASIX) 10 mg/mL injection        heparin (porcine) 5,000 unit/mL injection        heparin 25,000 units in D5W 250 ml infusion  12-25 Units/kg/hr IntraVENous TITRATE    nitroglycerin (NITROBID) 2 % ointment 1 Inch  1 Inch Topical Q6H    acetaminophen (TYLENOL) tablet 650 mg  650 mg Oral Q4H PRN    oxyCODONE-acetaminophen (PERCOCET) 5-325 mg per tablet 1 Tab  1 Tab Oral Q4H PRN    anastrozole (ARIMIDEX) tablet 1 mg  1 mg Oral DAILY    aspirin delayed-release tablet 81 mg  81 mg Oral DAILY    carvedilol (COREG) tablet 6.25 mg  6.25 mg Oral BID WITH MEALS    clopidogrel (PLAVIX) tablet 75 mg  75 mg Oral DAILY    docusate sodium (COLACE) capsule 100 mg  100 mg Oral BID    . PHARMACY TO SUBSTITUTE PER PROTOCOL    Per Protocol    . PHARMACY TO SUBSTITUTE PER PROTOCOL    Per Protocol    folic acid (FOLVITE) tablet 1 mg  1 mg Oral DAILY    . PHARMACY TO SUBSTITUTE PER PROTOCOL    Per Protocol    lactobac ac& pc-s.therm-b.anim (BERYL Q/RISAQUAD)  1 Cap Oral DAILY    levothyroxine (SYNTHROID) tablet 88 mcg  88 mcg Oral ACB    sodium chloride (NS) flush 5-10 mL  5-10 mL IntraVENous Q8H    sodium chloride (NS) flush 5-10 mL  5-10 mL IntraVENous PRN    ondansetron (ZOFRAN) injection 4 mg  4 mg IntraVENous Q4H PRN    insulin lispro (HUMALOG) injection   SubCUTAneous AC&HS    glucose chewable tablet 16 g  4 Tab Oral PRN    dextrose (D50W) injection syrg 12.5-25 g  12.5-25 g IntraVENous PRN    glucagon (GLUCAGEN) injection 1 mg  1 mg IntraMUSCular PRN    fentaNYL citrate (PF) injection 12.5 mcg  12.5 mcg IntraVENous Q4H PRN    balsam peru-castor oil (VENELEX)  mg/gram ointment   Topical BID    [START ON 6/2/2018] furosemide (LASIX) injection 40 mg  40 mg IntraVENous DAILY          Brice Shields.  MARIETTA Red MD     [facility] [address] [place] [address], [address] [place] [address] [address]   [facility]   [phone]

## 2018-06-01 NOTE — IP AVS SNAPSHOT
[address] [facility] [address] [place] [address] 
[phone] Patient: Arthur Hdez MRN: [id number] JK:[id number] About your hospitalization You were admitted on:  June 1, 2018 You last received care in the:  [facility] 4 CV SERVICES UNIT You were discharged on:  June 6, 2018 Why you were hospitalized Your primary diagnosis was:  Nstemi (Non-St Elevated Myocardial Infarction) (Hcc) Your diagnoses also included:  Metastatic Breast Cancer (Hcc), Goals Of Care, Counseling/Discussion Follow-up Information Follow up With Details Comments Contact Info [place]   [address] [place] [address] 
[phone] 4 [facility], [address] On 6/6/2018 in rehab [address] [facility] [address] 
[phone] Your Scheduled Appointments Thursday July 05, 2018 11:00 AM EDT [facility] Internists Betty Umanzor) [address] , [address] 435 [facility]  
[phone] Discharge Orders None A check stella indicates which time of day the medication should be taken. My Medications START taking these medications Instructions Each Dose to Equal  
 Morning Noon Evening Bedtime  
 nitroglycerin 0.4 mg SL tablet Commonly known as:  NITROSTAT Your last dose was: Your next dose is:    
   
   
 1 Tab by SubLINGual route as needed for Chest Pain. Up to 3 doses. 0.4 mg  
    
   
   
   
  
  
CONTINUE taking these medications Instructions Each Dose to Equal  
 Morning Noon Evening Bedtime  
 anastrozole 1 mg tablet Commonly known as:  ARIMIDEX Your last dose was: Your next dose is: Take 1 Tab by mouth daily. 1 mg  
    
   
   
   
  
 aspirin delayed-release 81 mg tablet Your last dose was: Your next dose is: Take 81 mg by mouth daily. 81 mg  
    
   
   
   
  
 carvedilol 6.25 mg tablet Commonly known as:  Ady Sethi Your last dose was: Your next dose is: Take 1 Tab by mouth two (2) times daily (with meals). 6.25 mg  
    
   
   
   
  
 clopidogrel 75 mg Tab Commonly known as:  PLAVIX Your last dose was: Your next dose is: Take 1 Tab by mouth daily. 75 mg  
    
   
   
   
  
 docusate sodium 100 mg capsule Commonly known as:  Keagan Lints Your last dose was: Your next dose is: Take 1 Cap by mouth two (2) times a day for 90 days. 100 mg  
    
   
   
   
  
 epoetin celio 10,000 unit/mL injection Commonly known as:  EPOGEN;PROCRIT Your last dose was: Your next dose is:    
   
   
 1 mL by SubCUTAneous route every Monday, Wednesday, Friday. 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  
 64213 Units MAKAYLA-C 500 mg Tab Generic drug:  ascorbate calcium Your last dose was: Your next dose is: Take 1,000 mg by mouth daily. 1000 mg  
    
   
   
   
  
 etanercept 50 mg/mL (0.98 mL) injection Commonly known as:  ENBREL Your last dose was: Your next dose is:    
   
   
 1 mL by SubCUTAneous route every seven (7) days. 50 mg  
    
   
   
   
  
 evolocumab pen injection Commonly known as:  Gastondi Mandril Your last dose was: Your next dose is:    
   
   
 1 mL by SubCUTAneous route every fourteen (14) days. 140 mg  
    
   
   
   
  
 folic acid 1 mg tablet Commonly known as:  Google Your last dose was: Your next dose is: TAKE ONE TABLET BY MOUTH DAILY FORTEO 20 mcg/dose - 600 mcg/2.4 mL Pnij injection Generic drug:  teriparatide Your last dose was: Your next dose is: INJECT 20MCG ONCE DAILY  
     
   
   
   
  
 furosemide 20 mg tablet Commonly known as:  LASIX Your last dose was: Your next dose is: Take one pill daily by mouth .  
     
   
   
   
  
 glimepiride 1 mg tablet Commonly known as:  AMARYL Your last dose was: Your next dose is: Take 1 mg by mouth every morning. 1 mg HAIR,SKIN & NAILS PO Your last dose was: Your next dose is: Take 1 Tab by mouth daily. 1 Tab JANUVIA 25 mg tablet Generic drug:  SITagliptin Your last dose was: Your next dose is: Take 50 mg by mouth daily. 50 mg  
    
   
   
   
  
 L. acidoph & paracasei- S therm- Bifido 8 billion cell Cap cap Commonly known as:  BERYL-Q/RISAQUAD Your last dose was: Your next dose is: Take 1 Cap by mouth daily. 1 Cap  
    
   
   
   
  
 lisinopril 2.5 mg tablet Commonly known as:  Maurisio Kaylee Your last dose was: Your next dose is: Take 1 Tab by mouth daily. 2.5 mg  
    
   
   
   
  
 MAGNESIUM MALATE Your last dose was: Your next dose is: Take 400 mg by mouth daily. 400 mg  
    
   
   
   
  
 OCUVITE PO Your last dose was: Your next dose is: Take 1 Tab by mouth daily. 1 Tab OMEGA 3 FISH OIL PO Your last dose was: Your next dose is: Take 300 mg by mouth daily. 300 mg  
    
   
   
   
  
 phosphorus 250 mg tablet Commonly known as:  K PHOS NEUTRAL Your last dose was: Your next dose is: Take 1 Tab by mouth two (2) times a day. 1 Tab  
    
   
   
   
  
 polyethylene glycol 17 gram packet Commonly known as:  Kimberly Scott Your last dose was: Your next dose is: Take 1 Packet by mouth daily. 17 g SYNTHROID 88 mcg tablet Generic drug:  levothyroxine Your last dose was: Your next dose is: Take 88 mcg by mouth Daily (before breakfast). 88 mcg VITAMIN D3 PO Your last dose was: Your next dose is: Take 1,000 Units by mouth daily. 1000 Units STOP taking these medications   
 spironolactone 25 mg tablet Commonly known as:  ALDACTONE Where to Get Your Medications Information on where to get these meds will be given to you by the nurse or doctor. ! Ask your nurse or doctor about these medications  
  nitroglycerin 0.4 mg SL tablet Discharge Instructions Discharge SNF/Rehab Instructions/LTAC  
 
 
PATIENT ID: Kelby Werner MRN: [id number] YOB: 1935 DATE OF ADMISSION: 6/1/2018  3:29 AM   
DATE OF DISCHARGE: 6/6/2018 PRIMARY CARE PROVIDER: Emilia Santos MD  
 
 
ATTENDING PHYSICIAN: Elise De La Torre MD 
DISCHARGING PROVIDER: Elise De La Torre MD    
To contact this individual call [phone] and ask the  to page. If unavailable ask to be transferred the Adult Hospitalist Department. CONSULTATIONS: None PROCEDURES/SURGERIES: * No surgery found * [id number] [facility] COURSE:  
This is an 82-year-old woman with a past medical history significant for coronary artery disease, chronic systolic congestive heart failure, metastatic left breast cancer, hypertension, type 2 diabetes, hypothyroidism, rheumatoid arthritis, dyslipidemia, chronic kidney disease, colon cancer, was in her usual state of health until the day of her presentation at the emergency room when the patient developed shortness of breath and chest pain at the rehab facility where the patient is presently receiving rehabilitation.  The symptoms started 2 hours before coming to the emergency room.  This shortness of breath is with little or no activities.  The chest pain is located at the center of the chest with talc radiation.  No known aggravating or relieving factors.  The chest pain is 10/10 in severity, sharp and constant.  At the onset of the patient's symptoms, there was concern for ST elevation myocardial infarction.  The emergency room department was alerted. Chucky Conchacarole the patient arrived at the emergency room, code STEMI was called.  The protocol for code STEMI was followed. [address] cardiology came to the emergency room. [address] [facility] cardiology is of the opinion that the patient has a non-ST elevation myocardial infarction and advised admission to the hospitalist service. Alisia Pena was also found to be in acute on chronic systolic congestive heart failure.   
 
NSTEMI, severe multivessel CAD with recent ACS/ NSTEMI 
-continue aspirin, plavix, coreg and prn nitroglycerine, 
-not on statin due to previous intolerance 
-off oxygen, SaO2 95-96% on RA 
-not a surgical candidate 
-troponin 1.44 
-echo LV EF 30% no obvious wall motion abnormalitis 
-cardiologist on board  
Acute on chronic systolic CHF NYHA Class IV 
-repeated echo on 6/1 LV EF 30% 
-Chest x ray 6/2 improving pulmonary edema, unchanged left pleural effusion 
-probnp 4165 
-continue lasix, coreg and lisinopril 
-spironolactone is held for hyperkalemia 
-monitor I/O and daily weight 
-cardiologist on board   
Acute on chronic hypercapnic hypoxic respiratory failure due to CHF  
-improved, off BiPAP now, on lasix, 
-off oxygen, on RA SaO2 95-96%, monitor pulse ox Metastatic left breast cancer  
-stable, continue arimidex   
HTN 
-BP normal, continue coreg, lisinopril and lasix for now  
DM-II with hyperglycemia  
-On sliding scale, monitor finger stick glucose   
Hypothyroidism 
-continue sythroid CKD stage III 
-creatinine normal 
-avoid nephrotoxine 
-monitor renal function Mild hyponatremia  
-improving Mild hyperkalemia  
- resolved 
-held spironolactone Hyperphosphatemia  
-repeat phosphorus level  
Anemia of chronic disease  
-H/H stable Hx of RA 
 -stable 
-on etanercept   
Lower sacrum & gluteal cleft chronic wound POA 
-improved, wound care on board 
   
  
Code status: DNR 
DVT prophylaxis: SCD 
 
 
 
DISCHARGE DIAGNOSES / PLAN:   
 
NSTEMI, severe multivessel CAD with recent ACS/ NSTEMI 
-continue aspirin, plavix, coreg and prn nitroglycerine, 
-not on statin due to previous intolerance 
-follow up with cardiologist as an outpatient Acute on chronic systolic CHF NYHA Class IV 
-continue lasix, coreg and lisinopril 
-spironolactone is held for hyperkalemia 
-monitor I/O and daily weight 
-follow up with cardiologist as an outpatient Acute on chronic hypercapnic hypoxic respiratory failure due to CHF  
-resolved, on RA SaO2 95-96%, monitor pulse ox Metastatic left breast cancer  
-stable, continue arimidex   
HTN 
-continue coreg and lisinopril and monitor BP 
DM-II with hyperglycemia  
-On sliding scale, monitor finger stick glucose   
Hypothyroidism 
-continue sythroid CKD stage III 
-creatinine normal 
Mild hyponatremia  
-improving Mild hyperkalemia  
- resolved 
-held spironolactone Hyperphosphatemia  
-repeat phosphorus level  
Anemia of chronic disease  
-H/H stable Hx of RA 
-stable 
-on etanercept   
Lower sacrum & gluteal cleft chronic wound POA 
-improved, wound care on board 
   
PENDING TEST RESULTS:  
At the time of discharge the following test results are still pending: none FOLLOW UP APPOINTMENTS:   
Follow-up Information Follow up With Details Comments Contact Info 1. Delores Stevenson MD In one week after discharge  [address] [facility] [address] [place] [address] 
[phone] 2. Follow up with Marcela Kevin MD, Cardiologist in one week 3. Gonsalo Senior MD, Hematology/Oncology on 6/15/2015 ADDITIONAL CARE RECOMMENDATIONS:  
 
DIET: Diabetic Diet TUBE FEEDING INSTRUCTIONS: none OXYGEN / BiPAP SETTINGS: none ACTIVITY: Activity as tolerated WOUND CARE: Please maintain Duoderm up to one week or change as needed with soiling or rolled edges. Please use adhesive remover wipes when changing. EQUIPMENT needed: none DISCHARGE MEDICATIONS: 
 See Medication Reconciliation Form NOTIFY YOUR PHYSICIAN FOR ANY OF THE FOLLOWING:  
Fever over 101 degrees for 24 hours. Chest pain, shortness of breath, fever, chills, nausea, vomiting, diarrhea, change in mentation, falling, weakness, bleeding. Severe pain or pain not relieved by medications. Or, any other signs or symptoms that you may have questions about. DISPOSITION: 
  Home With: 
 OT  PT  HH  RN  
  
x SNF/Inpatient Rehab/LTAC Independent/assisted living Hospice Other:  
 
 
PATIENT CONDITION AT DISCHARGE:  
 
Functional status Poor   
x Deconditioned Independent Cognition  
x  Lucid Forgetful Dementia Catheters/lines (plus indication) Alford PICC   
 PEG   
x None Code status Full code   
x DNR PHYSICAL EXAMINATION AT DISCHARGE: 
 Refer to Progress Note CHRONIC MEDICAL DIAGNOSES: 
Problem List as of 6/5/2018  Date Reviewed: 6/1/2018 Codes Class Noted - Resolved * (Principal)NSTEMI (non-ST elevated myocardial infarction) ([facility] ICD-10-CM: I21.4 ICD-9-CM: 410.70  6/1/2018 - Present Metastatic breast cancer ([facility] 75.) ICD-10-CM: O95.932 ICD-9-CM: 174.9  6/1/2018 - Present Goals of care, counseling/discussion ICD-10-CM: Z71.89 ICD-9-CM: V65.49  6/1/2018 - Present Acute on chronic systolic HF (heart failure) (HCC) ICD-10-CM: T35.45 ICD-9-CM: 428.23  5/19/2018 - Present Acute systolic heart failure (HCC) ICD-10-CM: I50.21 ICD-9-CM: 428.21  4/24/2018 - Present Altered mental status, unspecified ICD-10-CM: R41.82 
ICD-9-CM: 780.97  4/23/2018 - Present SOB (shortness of breath) ICD-10-CM: R06.02 
ICD-9-CM: 786.05  4/16/2018 - Present CKD (chronic kidney disease) stage 3, GFR 30-59 ml/min ICD-10-CM: N18.3 ICD-9-CM: 585.3  10/25/2017 - Present Vitamin D deficiency ICD-10-CM: E55.9 ICD-9-CM: 268.9  6/16/2017 - Present Asymptomatic hyperuricemia ICD-10-CM: E79.0 ICD-9-CM: 790.6  2/16/2017 - Present Statin intolerance ICD-10-CM: Z78.9 ICD-9-CM: 995.27  12/21/2016 - Present Long-term use of immunosuppressant medication ICD-10-CM: Z79.899 ICD-9-CM: V58.69  11/21/2016 - Present Seropositive rheumatoid arthritis of multiple sites [facility]) ICD-10-CM: M05.79 ICD-9-CM: 714.0  9/28/2016 - Present Primary osteoarthritis of both knees ICD-10-CM: M17.0 ICD-9-CM: 715.16  9/28/2016 - Present Occlusion and stenosis of carotid artery without mention of cerebral infarction ICD-10-CM: I65.29 ICD-9-CM: 433.10  8/23/2013 - Present Weakness due to cerebrovascular accident ICD-10-CM: HPE6211 ICD-9-CM: KDO3534  4/2/2012 - Present Neuropathy in diabetes [facility]) ICD-10-CM: E11.40 ICD-9-CM: 250.60, 357.2  4/2/2012 - Present PAD (peripheral artery disease) (HCC) ICD-10-CM: I73.9 ICD-9-CM: 443.9  9/7/2011 - Present Carotid bruit ICD-10-CM: R09.89 ICD-9-CM: 785.9  8/31/2011 - Present Claudication [facility]) ICD-10-CM: I73.9 ICD-9-CM: 443.9  8/31/2011 - Present Weakness of left arm ICD-10-CM: R29.898 ICD-9-CM: 729.89  8/31/2011 - Present Hyperlipidemia ICD-10-CM: E78.5 ICD-9-CM: 272.4  1/27/2010 - Present DM (diabetes mellitus) ([facility] ICD-10-CM: E11.9 ICD-9-CM: 250.00  9/2/2009 - Present Hypothyroid ICD-10-CM: E03.9 ICD-9-CM: 244.9  9/2/2009 - Present Colon cancer [facility]) ICD-10-CM: C18.9 ICD-9-CM: 153.9  9/2/2009 - Present H/O: CVA ICD-9-CM: V12.54  9/2/2009 - Present Microalbuminuria ICD-10-CM: R80.9 ICD-9-CM: 791.0  9/2/2009 - Present Age-related osteoporosis without current pathological fracture ICD-10-CM: M81.0 ICD-9-CM: 733.01  9/2/2009 - Present Essential hypertension ICD-10-CM: I10 
ICD-9-CM: 401.9  9/2/2009 - Present Anemia ICD-10-CM: D64.9 ICD-9-CM: 285.9  9/2/2009 - Present CDMP Checked:  
Yes x PROBLEM LIST Updated: 
Yes x Signed:  
Jamal Brennan MD 
6/5/2018 
9:08 AM 
 
  
 
  
  
  
VisionGate Announcement We are excited to announce that we are making your provider's discharge notes available to you in VisionGate. You will see these notes when they are completed and signed by the physician that discharged you from your recent hospital stay. If you have any questions or concerns about any information you see in VisionGate, please call the Health Information Department where you were seen or reach out to your Primary Care Provider for more information about your plan of care. Introducing [facility]! Lindsay Roger introduces VisionGate patient portal. Now you can access parts of your medical record, email your doctor's office, and request medication refills online. 1. In your internet browser, go to [email or web address] 2. Click on the First Time User? Click Here link in the Sign In box. You will see the New Member Sign Up page. 3. Enter your VisionGate Access Code exactly as it appears below. You will not need to use this code after youve completed the sign-up process. If you do not sign up before the expiration date, you must request a new code. · VisionGate Access Code: [email or web address] Expires: 8/9/2018 10:02 AM 
 
4. Enter the last four digits of your Social Security Number (xxxx) and Date of Birth (mm/dd/yyyy) as indicated and click Submit. You will be taken to the next sign-up page. 5. Create a VisionGate ID. This will be your VisionGate login ID and cannot be changed, so think of one that is secure and easy to remember. 6. Create a VisionGate password. You can change your password at any time. 7. Enter your Password Reset Question and Answer. This can be used at a later time if you forget your password. 8. Enter your e-mail address. You will receive e-mail notification when new information is available in [address] 9. Click Sign Up. You can now view and download portions of your medical record. 10. Click the Download Summary menu link to download a portable copy of your medical information. If you have questions, please visit the Frequently Asked Questions section of the [employer] website. Remember, [employer] is NOT to be used for urgent needs. For medical emergencies, dial 911. Now available from your iPhone and Android! Introducing Eliot Vazquez As a [employer] patient, I wanted to make you aware of our electronic visit tool called Eliot Vazquez. [employer] 24/7 allows you to connect within minutes with a medical provider 24 hours a day, seven days a week via a mobile device or tablet or logging into a secure website from your computer. You can access Eliot Vazquez from anywhere in the [place]. A virtual visit might be right for you when you have a simple condition and feel like you just dont want to get out of bed, or cant get away from work for an appointment, when your regular [employer] provider is not available (evenings, weekends or holidays), or when youre out of town and need minor care. Electronic visits cost only $49 and if the [employer] 24/7 provider determines a prescription is needed to treat your condition, one can be electronically transmitted to a nearby pharmacy*. Please take a moment to enroll today if you have not already done so. The enrollment process is free and takes just a few minutes. To enroll, please download the [employer] 24/7 teresita to your tablet or phone, or visit [email or web address] to enroll on your computer.    
And, as an [address] [place] [address] patient with a [employer] account, the results of your visits will be scanned into your electronic medical record and your primary care provider will be able to view the scanned results. We urge you to continue to see your regular [employer] provider for your ongoing medical care. And while your primary care provider may not be the one available when you seek a Eliot Cadenafin virtual visit, the peace of mind you get from getting a real diagnosis real time can be priceless. For more information on [employer], view our Frequently Asked Questions (FAQs) at [email or web address]. Sincerely, 
 
Valarie Jin MD 
Chief Medical Officer Laury Sellers *:  certain medications cannot be prescribed via [employer] Unresulted Labs-Please follow up with your PCP about these lab tests Order Current Status CA 27.29 In process Providers Seen During Your Hospitalization Provider Specialty Primary office phone Areli Rivero MD Emergency Medicine [phone] Gurdeep Fabian MD Internal Medicine [phone] Rosmery Robertson MD Internal Medicine [phone] Lyndsay Henderson MD Internal Medicine [phone] Your Primary Care Physician (PCP) Primary Care Physician Office Phone Office Fax Rhoda Pérez [phone] You are allergic to the following Allergen Reactions Statins-Hmg-Coa Reductase Inhibitors Other (comments) Intolerant to statins Sulfa (Sulfonamide Antibiotics) Other (comments)  
 syncope Recent Documentation Height Weight BMI OB Status Smoking Status 1.549 m 58.7 kg 24.45 kg/m2 Hysterectomy Never Smoker Emergency Contacts Name Discharge Info Relation Home Work Mobile [address] CAREGIVER [3] Son [22] [phone] [address] CAREGIVER [3] Son [22] [phone] Patient Belongings The following personal items are in your possession at time of discharge: 
  Dental Appliances: With patient  Visual Aid: None      Home Medications: None   Jewelry: With patient  Clothing: Pajamas    Other Valuables: With patient Discharge Instructions Attachments/References HEART FAILURE: AVOIDING TRIGGERS (ENGLISH) Patient Handouts Avoiding Triggers With Heart Failure: Care Instructions Your Care Instructions Triggers are anything that make your heart failure flare up. A flare-up is also called \"sudden heart failure\" or \"acute heart failure. \" When you have a flare-up, fluid builds up in your lungs, and you have problems breathing. You might need to go to the hospital. By watching for changes in your condition and avoiding triggers, you can prevent heart failure flare-ups. Follow-up care is a key part of your treatment and safety. Be sure to make and go to all appointments, and call your doctor if you are having problems. It's also a good idea to know your test results and keep a list of the medicines you take. How can you care for yourself at home? Watch for changes in your weight and condition · Weigh yourself without clothing at the same time each day. Record your weight. Call your doctor if you have sudden weight gain, such as more than 2 to 3 pounds in a day or 5 pounds in a week. (Your doctor may suggest a different range of weight gain.) A sudden weight gain may mean that your heart failure is getting worse. · Keep a daily record of your symptoms. Write down any changes in how you feel, such as new shortness of breath, cough, or problems eating. Also record if your ankles are more swollen than usual and if you feel more tired than usual. Note anything that you ate or did that could have triggered these changes. Limit sodium Sodium causes your body to hold on to extra water.  This may cause your heart failure symptoms to get worse. People get most of their sodium from processed foods. Fast food and restaurant meals also tend to be very high in sodium. · Your doctor may suggest that you limit sodium to 2,000 milligrams (mg) a day or less. That is less than 1 teaspoon of salt a day, including all the salt you eat in cooking or in packaged foods. · Read food labels on cans and food packages. They tell you how much sodium you get in one serving. Check the serving size. If you eat more than one serving, you are getting more sodium. · Be aware that sodium can come in forms other than salt, including monosodium glutamate (MSG), sodium citrate, and sodium bicarbonate (baking soda). MSG is often added to Asian food. You can sometimes ask for food without MSG or salt. · Slowly reducing salt will help you adjust to the taste. Take the salt shaker off the table. · Flavor your food with garlic, lemon juice, onion, vinegar, herbs, and spices instead of salt. Do not use soy sauce, steak sauce, onion salt, garlic salt, mustard, or ketchup on your food, unless it is labeled \"low-sodium\" or \"low-salt. \" 
· Make your own salad dressings, sauces, and ketchup without adding salt. · Use fresh or frozen ingredients, instead of canned ones, whenever you can. Choose low-sodium canned goods. · Eat less processed food and food from restaurants, including fast food. Exercise as directed Moderate, regular exercise is very good for your heart. It improves your blood flow and helps control your weight. But too much exercise can stress your heart and cause a heart failure flare-up. · Check with your doctor before you start an exercise program. 
· Walking is an easy way to get exercise. Start out slowly. Gradually increase the length and pace of your walk. Swimming, riding a bike, and using a treadmill are also good forms of exercise. · When you exercise, watch for signs that your heart is working too hard. You are pushing yourself too hard if you cannot talk while you are exercising. If you become short of breath or dizzy or have chest pain, stop, sit down, and rest. 
· Do not exercise when you do not feel well. Take medicines correctly · Take your medicines exactly as prescribed. Call your doctor if you think you are having a problem with your medicine. · Make a list of all the medicines you take. Include those prescribed to you by other doctors and any over-the-counter medicines, vitamins, or supplements you take. Take this list with you when you go to any doctor. · Take your medicines at the same time every day. It may help you to post a list of all the medicines you take every day and what time of day you take them. · Make taking your medicine as simple as you can. Plan times to take your medicines when you are doing other things, such as eating a meal or getting ready for bed. This will make it easier to remember to take your medicines. · Get organized. Use helpful tools, such as daily or weekly pill containers. When should you call for help? Call 911 if you have symptoms of sudden heart failure such as: 
? · You have severe trouble breathing. ? · You cough up pink, foamy mucus. ? · You have a new irregular or rapid heartbeat. ?Call your doctor now or seek immediate medical care if: 
? · You have new or increased shortness of breath. ? · You are dizzy or lightheaded, or you feel like you may faint. ? · You have sudden weight gain, such as more than 2 to 3 pounds in a day or 5 pounds in a week. (Your doctor may suggest a different range of weight gain.) ? · You have increased swelling in your legs, ankles, or feet. ? · You are suddenly so tired or weak that you cannot do your usual activities. ? Watch closely for changes in your health, and be sure to contact your doctor if you develop new symptoms. Where can you learn more? Go to http://david-noni.info/. Enter T107 in the search box to learn more about \"Avoiding Triggers With Heart Failure: Care Instructions. \" Current as of: September 21, 2016 Content Version: 11.4 © 2006-2017 [employer]. Care instructions adapted under license by [employer] (which disclaims liability or warranty for this information). If you have questions about a medical condition or this instruction, always ask your healthcare professional. [address] any warranty or liability for your use of this information. Please provide this summary of care documentation to your next provider. Signatures-by signing, you are acknowledging that this After Visit Summary has been reviewed with you and you have received a copy. Patient Signature:  ____________________________________________________________ Date:  ____________________________________________________________  
  
[facility] Provider Signature:  ____________________________________________________________ Date:  ____________________________________________________________

## 2018-06-01 NOTE — PROGRESS NOTES
Bedside and Verbal shift change report given to Marsha Kathleen RN (oncoming nurse) by Saroj Porter RN (offgoing nurse). Report included the following information SBAR, Kardex, Intake/Output, MAR and Recent Results.

## 2018-06-01 NOTE — ROUTINE PROCESS
TRANSFER - OUT REPORT:    Verbal report given to ARIANA Lozada(name) on [facility]  being transferred to CCU(unit) for routine progression of care       Report consisted of patients Situation, Background, Assessment and   Recommendations(SBAR). Information from the following report(s) SBAR, Kardex, ED Summary and Recent Results was reviewed with the receiving nurse. Lines:   Peripheral IV 06/01/18 Right Antecubital (Active)   Site Assessment Clean, dry, & intact 6/1/2018  3:54 AM   Phlebitis Assessment 0 6/1/2018  3:54 AM   Infiltration Assessment 0 6/1/2018  3:54 AM   Dressing Status Clean, dry, & intact 6/1/2018  3:54 AM   Dressing Type Transparent 6/1/2018  3:54 AM   Hub Color/Line Status Green 6/1/2018  3:54 AM   Alcohol Cap Used Yes 6/1/2018  3:54 AM       Peripheral IV 06/01/18 Right Hand (Active)   Site Assessment Clean, dry, & intact 6/1/2018  3:55 AM   Phlebitis Assessment 0 6/1/2018  3:55 AM   Infiltration Assessment 0 6/1/2018  3:55 AM   Dressing Status Clean, dry, & intact 6/1/2018  3:55 AM   Dressing Type Transparent 6/1/2018  3:55 AM   Hub Color/Line Status Pink 6/1/2018  3:55 AM   Alcohol Cap Used Yes 6/1/2018  3:55 AM        Opportunity for questions and clarification was provided.       Patient transported with:   Registered Nurse  Tech

## 2018-06-02 ENCOUNTER — APPOINTMENT (OUTPATIENT)
Dept: GENERAL RADIOLOGY | Age: 83
DRG: 280 | End: 2018-06-02
Attending: HOSPITALIST
Payer: MEDICARE

## 2018-06-02 LAB
ALBUMIN SERPL-MCNC: 2.3 G/DL (ref 3.5–5)
ALBUMIN/GLOB SERPL: 0.5 {RATIO} (ref 1.1–2.2)
ALP SERPL-CCNC: 589 U/L (ref 45–117)
ALT SERPL-CCNC: 26 U/L (ref 12–78)
ANION GAP SERPL CALC-SCNC: 9 MMOL/L (ref 5–15)
APTT PPP: 39.6 SEC (ref 22.1–32)
APTT PPP: 52 SEC (ref 22.1–32)
APTT PPP: 68.8 SEC (ref 22.1–32)
APTT PPP: >130 SEC (ref 22.1–32)
AST SERPL-CCNC: 39 U/L (ref 15–37)
ATRIAL RATE: 132 BPM
BASOPHILS # BLD: 0 K/UL (ref 0–0.1)
BASOPHILS NFR BLD: 0 % (ref 0–1)
BILIRUB SERPL-MCNC: 0.3 MG/DL (ref 0.2–1)
BUN SERPL-MCNC: 40 MG/DL (ref 6–20)
BUN/CREAT SERPL: 32 (ref 12–20)
CALCIUM SERPL-MCNC: 9 MG/DL (ref 8.5–10.1)
CALCULATED P AXIS, ECG09: 65 DEGREES
CALCULATED R AXIS, ECG10: 57 DEGREES
CALCULATED T AXIS, ECG11: -130 DEGREES
CHLORIDE SERPL-SCNC: 102 MMOL/L (ref 97–108)
CO2 SERPL-SCNC: 22 MMOL/L (ref 21–32)
CREAT SERPL-MCNC: 1.25 MG/DL (ref 0.55–1.02)
DIAGNOSIS, 93000: NORMAL
DIFFERENTIAL METHOD BLD: ABNORMAL
EOSINOPHIL # BLD: 0.1 K/UL (ref 0–0.4)
EOSINOPHIL NFR BLD: 1 % (ref 0–7)
ERYTHROCYTE [DISTWIDTH] IN BLOOD BY AUTOMATED COUNT: 19.3 % (ref 11.5–14.5)
GLOBULIN SER CALC-MCNC: 4.9 G/DL (ref 2–4)
GLUCOSE BLD STRIP.AUTO-MCNC: 141 MG/DL (ref 65–100)
GLUCOSE BLD STRIP.AUTO-MCNC: 169 MG/DL (ref 65–100)
GLUCOSE BLD STRIP.AUTO-MCNC: 224 MG/DL (ref 65–100)
GLUCOSE BLD STRIP.AUTO-MCNC: 241 MG/DL (ref 65–100)
GLUCOSE SERPL-MCNC: 166 MG/DL (ref 65–100)
HCT VFR BLD AUTO: 29.6 % (ref 35–47)
HGB BLD-MCNC: 9.1 G/DL (ref 11.5–16)
IMM GRANULOCYTES # BLD: 0.1 K/UL (ref 0–0.04)
IMM GRANULOCYTES NFR BLD AUTO: 1 % (ref 0–0.5)
LYMPHOCYTES # BLD: 1.7 K/UL (ref 0.8–3.5)
LYMPHOCYTES NFR BLD: 16 % (ref 12–49)
MCH RBC QN AUTO: 27.2 PG (ref 26–34)
MCHC RBC AUTO-ENTMCNC: 30.7 G/DL (ref 30–36.5)
MCV RBC AUTO: 88.6 FL (ref 80–99)
MONOCYTES # BLD: 1.3 K/UL (ref 0–1)
MONOCYTES NFR BLD: 12 % (ref 5–13)
NEUTS SEG # BLD: 7.8 K/UL (ref 1.8–8)
NEUTS SEG NFR BLD: 71 % (ref 32–75)
NRBC # BLD: 0 K/UL (ref 0–0.01)
NRBC BLD-RTO: 0 PER 100 WBC
P-R INTERVAL, ECG05: 130 MS
PLATELET # BLD AUTO: 307 K/UL (ref 150–400)
PMV BLD AUTO: 9.3 FL (ref 8.9–12.9)
POTASSIUM SERPL-SCNC: 4.9 MMOL/L (ref 3.5–5.1)
PROT SERPL-MCNC: 7.2 G/DL (ref 6.4–8.2)
Q-T INTERVAL, ECG07: 302 MS
QRS DURATION, ECG06: 110 MS
QTC CALCULATION (BEZET), ECG08: 447 MS
RBC # BLD AUTO: 3.34 M/UL (ref 3.8–5.2)
SERVICE CMNT-IMP: ABNORMAL
SODIUM SERPL-SCNC: 133 MMOL/L (ref 136–145)
THERAPEUTIC RANGE,PTTT: ABNORMAL SECS (ref 58–77)
VENTRICULAR RATE, ECG03: 132 BPM
WBC # BLD AUTO: 11 K/UL (ref 3.6–11)

## 2018-06-02 PROCEDURE — 85025 COMPLETE CBC W/AUTO DIFF WBC: CPT | Performed by: INTERNAL MEDICINE

## 2018-06-02 PROCEDURE — 74011250637 HC RX REV CODE- 250/637: Performed by: NURSE PRACTITIONER

## 2018-06-02 PROCEDURE — 74011250637 HC RX REV CODE- 250/637: Performed by: INTERNAL MEDICINE

## 2018-06-02 PROCEDURE — 85730 THROMBOPLASTIN TIME PARTIAL: CPT | Performed by: HOSPITALIST

## 2018-06-02 PROCEDURE — 74011250636 HC RX REV CODE- 250/636: Performed by: INTERNAL MEDICINE

## 2018-06-02 PROCEDURE — 74011636637 HC RX REV CODE- 636/637: Performed by: INTERNAL MEDICINE

## 2018-06-02 PROCEDURE — 82962 GLUCOSE BLOOD TEST: CPT

## 2018-06-02 PROCEDURE — 71045 X-RAY EXAM CHEST 1 VIEW: CPT

## 2018-06-02 PROCEDURE — 80053 COMPREHEN METABOLIC PANEL: CPT | Performed by: INTERNAL MEDICINE

## 2018-06-02 PROCEDURE — 65660000000 HC RM CCU STEPDOWN

## 2018-06-02 PROCEDURE — 74011250636 HC RX REV CODE- 250/636: Performed by: NURSE PRACTITIONER

## 2018-06-02 PROCEDURE — 36415 COLL VENOUS BLD VENIPUNCTURE: CPT | Performed by: INTERNAL MEDICINE

## 2018-06-02 PROCEDURE — 77010033678 HC OXYGEN DAILY

## 2018-06-02 RX ORDER — FUROSEMIDE 20 MG/1
20 TABLET ORAL DAILY
Status: DISCONTINUED | OUTPATIENT
Start: 2018-06-02 | End: 2018-06-06 | Stop reason: HOSPADM

## 2018-06-02 RX ORDER — HEPARIN SODIUM 5000 [USP'U]/ML
60 INJECTION, SOLUTION INTRAVENOUS; SUBCUTANEOUS ONCE
Status: COMPLETED | OUTPATIENT
Start: 2018-06-02 | End: 2018-06-02

## 2018-06-02 RX ADMIN — DOCUSATE SODIUM 100 MG: 100 CAPSULE, LIQUID FILLED ORAL at 17:10

## 2018-06-02 RX ADMIN — INSULIN LISPRO 3 UNITS: 100 INJECTION, SOLUTION INTRAVENOUS; SUBCUTANEOUS at 17:10

## 2018-06-02 RX ADMIN — Medication 10 ML: at 14:00

## 2018-06-02 RX ADMIN — DOCUSATE SODIUM 100 MG: 100 CAPSULE, LIQUID FILLED ORAL at 08:23

## 2018-06-02 RX ADMIN — CLOPIDOGREL BISULFATE 75 MG: 75 TABLET ORAL at 08:23

## 2018-06-02 RX ADMIN — Medication 10 ML: at 05:11

## 2018-06-02 RX ADMIN — Medication 10 ML: at 21:30

## 2018-06-02 RX ADMIN — FOLIC ACID 1 MG: 1 TABLET ORAL at 08:23

## 2018-06-02 RX ADMIN — HEPARIN SODIUM 3400 UNITS: 5000 INJECTION, SOLUTION INTRAVENOUS; SUBCUTANEOUS at 05:08

## 2018-06-02 RX ADMIN — INSULIN LISPRO 2 UNITS: 100 INJECTION, SOLUTION INTRAVENOUS; SUBCUTANEOUS at 21:30

## 2018-06-02 RX ADMIN — LEVOTHYROXINE SODIUM 88 MCG: 88 TABLET ORAL at 07:32

## 2018-06-02 RX ADMIN — FUROSEMIDE 40 MG: 10 INJECTION, SOLUTION INTRAMUSCULAR; INTRAVENOUS at 08:22

## 2018-06-02 RX ADMIN — INSULIN LISPRO 2 UNITS: 100 INJECTION, SOLUTION INTRAVENOUS; SUBCUTANEOUS at 10:46

## 2018-06-02 RX ADMIN — HEPARIN SODIUM 14 UNITS/KG/HR: 10000 INJECTION, SOLUTION INTRAVENOUS at 13:46

## 2018-06-02 RX ADMIN — ANASTROZOLE 1 MG: 1 TABLET, COATED ORAL at 08:22

## 2018-06-02 RX ADMIN — CARVEDILOL 3.12 MG: 3.12 TABLET, FILM COATED ORAL at 23:13

## 2018-06-02 RX ADMIN — Medication 1 CAPSULE: at 08:23

## 2018-06-02 RX ADMIN — INSULIN LISPRO 2 UNITS: 100 INJECTION, SOLUTION INTRAVENOUS; SUBCUTANEOUS at 07:40

## 2018-06-02 RX ADMIN — ASPIRIN 81 MG: 81 TABLET, COATED ORAL at 08:23

## 2018-06-02 NOTE — PROGRESS NOTES
Problem: Falls - Risk of  Goal: *Absence of Falls  Document Yaron Fall Risk and appropriate interventions in the flowsheet. Outcome: Progressing Towards Goal  Fall Risk Interventions:  Mobility Interventions: Communicate number of staff needed for ambulation/transfer, Patient to call before getting OOB, PT Consult for mobility concerns, PT Consult for assist device competence, Strengthening exercises (ROM-active/passive)    Mentation Interventions: Adequate sleep, hydration, pain control, Door open when patient unattended, Evaluate medications/consider consulting pharmacy, Increase mobility, More frequent rounding, Toileting rounds, Update white board    Medication Interventions: Evaluate medications/consider consulting pharmacy    Elimination Interventions: Call light in reach, Patient to call for help with toileting needs, Toileting schedule/hourly rounds    History of Falls Interventions: Consult care management for discharge planning, Evaluate medications/consider consulting pharmacy        Problem: Pressure Injury - Risk of  Goal: *Prevention of pressure injury  Document Calderon Scale and appropriate interventions in the flowsheet.      Duoderm  Turn approximately every 2 hours         Outcome: Progressing Towards Goal  Pressure Injury Interventions:  Sensory Interventions: Avoid rigorous massage over bony prominences, Float heels, Keep linens dry and wrinkle-free, Maintain/enhance activity level, Monitor skin under medical devices    Moisture Interventions: Absorbent underpads, Apply protective barrier, creams and emollients, Check for incontinence Q2 hours and as needed, Maintain skin hydration (lotion/cream), Moisture barrier, Offer toileting Q_hr    Activity Interventions: Increase time out of bed, Pressure redistribution bed/mattress(bed type), PT/OT evaluation    Mobility Interventions: Float heels, HOB 30 degrees or less, Pressure redistribution bed/mattress (bed type), PT/OT evaluation, Turn and reposition approx.  every two hours(pillow and wedges)    Nutrition Interventions: Document food/fluid/supplement intake, Discuss nutritional consult with provider, Offer support with meals,snacks and hydration    Friction and Shear Interventions: Apply protective barrier, creams and emollients, Foam dressings/transparent film/skin sealants, HOB 30 degrees or less, Lift sheet, Minimize layers               Problem: Unstable angina/NSTEMI: Day 2  Goal: *Hemodynamically stable  Outcome: Progressing Towards Goal  BP rosario, MD aware, made med changes  Continue monitoring    Problem: General Wound Care  Goal: *Non-infected wound: Improvement of existing wound, absence of infection, and maintenance of skin integrity  Outcome: Progressing Towards Goal  Wound care consulted and recommendations followed  Continue skin monitoring

## 2018-06-02 NOTE — PROGRESS NOTES
Hospitalist Progress Note  Angelo Hearn MD  Answering service: [phone] from in house phone  Cell: [phone]      Date of Service:  2018  NAME:  Norberto Lozada  :  1935  MRN:  [id number]      Admission Summary: This is an 46-year-old woman with a past medical history significant for coronary artery disease, chronic systolic congestive heart failure, metastatic left breast cancer, hypertension, type 2 diabetes, hypothyroidism, rheumatoid arthritis, dyslipidemia, chronic kidney disease, colon cancer, was in her usual state of health until the day of her presentation at the emergency room when the patient developed shortness of breath and chest pain at the rehab facility where the patient is presently receiving rehabilitation. The symptoms started 2 hours before coming to the emergency room. This shortness of breath is with little or no activities. The chest pain is located at the center of the chest with talc radiation. No known aggravating or relieving factors. The chest pain is 10/10 in severity, sharp and constant. At the onset of the patient's symptoms, there was concern for ST elevation myocardial infarction. The emergency room department was alerted. When the patient arrived at the emergency room, code STEMI was called. The protocol for code STEMI was followed. The cardiology came to the emergency room. The cardiology is of the opinion that the patient has a non-ST elevation myocardial infarction and advised admission to the hospitalist service. Patient was also found to be in acute on chronic systolic congestive heart failure.       Interval history / Subjective:      He feels better  No chest pain, sob, n/v      Assessment & Plan:     NSTEMI, severe multivessel CAD with recent ACS/ NSTEMI  -on heparin gtt, aspirin, plavix, coreg and prn nitroglycerine, oxygen support  -not a surgical candidate  -troponin 1.44  -echo LV EF 30% no obvious wall motion abnormalitis  -cardiologist on board    Acute on chronic systolic CHF NYHA Class IV  -repeated echo on 6/1 LV EF 30%  -Chest x ray New moderate interstitial pulmonary edema. Unchanged small left pleural effusion  -probnp 4165  -continue lasix IV for 1 more days then change to PO  - c/w Coreg  -hold lisinopril and spironolactone for borderline BP  -monitor I/O and daily weight  -cardiologist on board    Acute on chronic hypercapnic hypoxic respiratory failure due to CHF   -pH7. 33 pCO2 51 pO2 276 on FiO2 100%  -improved, off BiPAP now, on lasix, oxygen support, monitor pulse ox    Metastatic left breast cancer   -stable, continue arimidex    HTN  -BP normal, continue coreg and lasix for now    DM-II with hyperglycemia   -On sliding scale, monitor finger stick glucose    Hypothyroidism  -continue sythroid    CKD stage III  -creatinine stable  -avoid nephrotoxine  -monitor renal function    Mild hyponatremia   -BMP in am    Mild hyperkalemia   - resolved  -hold spironolactone and lisinopril    Hyperphosphatemia   -repeat phosphorus level    Anemia of chronic disease   -H/H stable    Hx of RA  -stable  -Received outpatient etanercept    Lower sacrum & gluteal cleft chronic wound POA  -wound care on board    DNR, at risk for decompensation      Code status: DNR   DVT prophylaxis: on heparin    Care Plan discussed with: Patient/Family and Nurse  Disposition: From [facility] giver at bedside, questions answered   I called her son, CORINNE Kelly [phone] and updated her clinical condition and answered his questions.   Update son every day     Hospital Problems  Date Reviewed: 6/1/2018          Codes Class Noted POA    * (Principal)NSTEMI (non-ST elevated myocardial infarction) ([address] ICD-10-CM: I21.4  ICD-9-CM: 410.70  6/1/2018 Yes        Metastatic breast cancer ([address] ICD-10-CM: C50.919  ICD-9-CM: 174.9  6/1/2018 Unknown        Goals of care, counseling/discussion ICD-10-CM: Z71.89  ICD-9-CM: V65.49  6/1/2018 Unknown                Vital Signs:    Last 24hrs VS reviewed since prior progress note. Most recent are:  Visit Vitals    BP (!) 115/39    Pulse 87    Temp 98.7 °F (37.1 °C)    Resp 18    Ht 5' 1\" (1.549 m)    Wt 55.9 kg (123 lb 4.8 oz)    SpO2 93%    BMI 23.3 kg/m2         Intake/Output Summary (Last 24 hours) at 06/02/18 1116  Last data filed at 06/02/18 1000   Gross per 24 hour   Intake           535.02 ml   Output                0 ml   Net           535.02 ml        Physical Examination:             Constitutional:  No acute distress, cooperative, pleasant    ENT:  Oral mucous moist, oropharynx benign. Neck supple,    Resp:  Decreased bronchial breath sound bilaterally. No wheezing/rhonchi/rales. No accessory muscle use   CV:  Regular rhythm, normal rate, no murmurs, gallops, rubs    GI:  Soft, non distended, non tender. normoactive bowel sounds, no hepatosplenomegaly     Musculoskeletal:  No edema    Neurologic:  Moves all extremities. AAOx3, CN II-XII reviewed     Skin:  Good turgor, no rashes or ulcers       Data Review:    Review and/or order of clinical lab test      Labs:     Recent Labs      06/02/18 0325 06/01/18 0345   WBC  11.0  10.1   HGB  9.1*  11.9   HCT  29.6*  40.1   PLT  307  424*     Recent Labs      06/02/18 0325 06/01/18   0646  06/01/18   0345   NA  133*   --   135*   K  4.9   --   5.5*   CL  102   --   101   CO2  22   --   22   BUN  40*   --   31*   CREA  1.25*   --   1.29*   GLU  166*   --   349*   CA  9.0   --   9.1   MG   --   1.8   --    PHOS   --   5.6*   --      Recent Labs      06/02/18 0325 06/01/18   0345   SGOT  39*  46*   ALT  26  29   AP  589*  804*   TBILI  0.3  0.3   TP  7.2  9.1*   ALB  2.3*  2.8*   GLOB  4.9*  6.3*     Recent Labs      06/02/18 0325 06/01/18   1916  06/01/18   1251   APTT  39.6*  90.1*  54.7*      No results for input(s): FE, TIBC, PSAT, FERR in the last 72 hours.    No results found for: FOL, RBCF   No results for input(s): PH, PCO2, PO2 in the last 72 hours.   Recent Labs      06/01/18   0646  06/01/18   0345   CPK  97   --    CKNDX  5.4*   --    TROIQ  1.44*  0.73*     Lab Results   Component Value Date/Time    Cholesterol, total 74 04/16/2018 04:21 AM    HDL Cholesterol 25 04/16/2018 04:21 AM    LDL, calculated 31.6 04/16/2018 04:21 AM    Triglyceride 87 04/16/2018 04:21 AM    CHOL/HDL Ratio 3.0 04/16/2018 04:21 AM     Lab Results   Component Value Date/Time    Glucose (POC) 169 (H) 06/02/2018 10:42 AM    Glucose (POC) 141 (H) 06/02/2018 07:38 AM    Glucose (POC) 187 (H) 06/01/2018 10:48 PM    Glucose (POC) 211 (H) 06/01/2018 04:47 PM    Glucose (POC) 277 (H) 06/01/2018 11:55 AM     Lab Results   Component Value Date/Time    Color YELLOW/STRAW 05/19/2018 02:58 AM    Appearance CLOUDY (A) 05/19/2018 02:58 AM    Specific gravity 1.015 05/19/2018 02:58 AM    pH (UA) 6.0 05/19/2018 02:58 AM    Protein 30 (A) 05/19/2018 02:58 AM    Glucose 500 (A) 05/19/2018 02:58 AM    Ketone NEGATIVE  05/19/2018 02:58 AM    Bilirubin NEGATIVE  05/19/2018 02:58 AM    Urobilinogen 0.2 05/19/2018 02:58 AM    Nitrites NEGATIVE  05/19/2018 02:58 AM    Leukocyte Esterase MODERATE (A) 05/19/2018 02:58 AM    Epithelial cells FEW 05/19/2018 02:58 AM    Bacteria NEGATIVE  05/19/2018 02:58 AM    WBC 20-50 05/19/2018 02:58 AM    RBC 10-20 05/19/2018 02:58 AM         Medications Reviewed:     Current Facility-Administered Medications   Medication Dose Route Frequency    furosemide (LASIX) tablet 20 mg  20 mg Oral DAILY    heparin 25,000 units in D5W 250 ml infusion  12-25 Units/kg/hr IntraVENous TITRATE    acetaminophen (TYLENOL) tablet 650 mg  650 mg Oral Q4H PRN    oxyCODONE-acetaminophen (PERCOCET) 5-325 mg per tablet 1 Tab  1 Tab Oral Q4H PRN    anastrozole (ARIMIDEX) tablet 1 mg  1 mg Oral DAILY    aspirin delayed-release tablet 81 mg  81 mg Oral DAILY    clopidogrel (PLAVIX) tablet 75 mg  75 mg Oral DAILY    docusate sodium (COLACE) capsule 100 mg  100 mg Oral BID    etanercept (ENBREL) injection 50 mg (Patient Supplied)  50 mg SubCUTAneous Q7D    [START ON 6/5/2018] evolocumab (REPATHA) syringe 140 mg (Patient Supplied)  140 mg SubCUTAneous Q 14 DAYS    folic acid (FOLVITE) tablet 1 mg  1 mg Oral DAILY    teriparatide (FORTEO) injection 20 mcg (Patient Supplied)  20 mcg SubCUTAneous DAILY    lactobac ac& pc-s.therm-b.anim (BERYL Q/RISAQUAD)  1 Cap Oral DAILY    levothyroxine (SYNTHROID) tablet 88 mcg  88 mcg Oral ACB    sodium chloride (NS) flush 5-10 mL  5-10 mL IntraVENous Q8H    sodium chloride (NS) flush 5-10 mL  5-10 mL IntraVENous PRN    ondansetron (ZOFRAN) injection 4 mg  4 mg IntraVENous Q4H PRN    insulin lispro (HUMALOG) injection   SubCUTAneous AC&HS    glucose chewable tablet 16 g  4 Tab Oral PRN    dextrose (D50W) injection syrg 12.5-25 g  12.5-25 g IntraVENous PRN    glucagon (GLUCAGEN) injection 1 mg  1 mg IntraMUSCular PRN    fentaNYL citrate (PF) injection 12.5 mcg  12.5 mcg IntraVENous Q4H PRN    carvedilol (COREG) tablet 3.125 mg  3.125 mg Oral BID WITH MEALS     ______________________________________________________________________  EXPECTED LENGTH OF STAY: 4d 7h  ACTUAL LENGTH OF STAY:          1                 Leila Delgado MD

## 2018-06-02 NOTE — PROGRESS NOTES
Problem: Falls - Risk of  Goal: *Absence of Falls  Document Yaron Fall Risk and appropriate interventions in the flowsheet. Outcome: Progressing Towards Goal  Fall Risk Interventions:  Mobility Interventions: Communicate number of staff needed for ambulation/transfer    Mentation Interventions: Adequate sleep, hydration, pain control    Medication Interventions: Evaluate medications/consider consulting pharmacy    Elimination Interventions: Call light in reach    History of Falls Interventions: Consult care management for discharge planning        Problem: Pressure Injury - Risk of  Goal: *Prevention of pressure injury  Document Calderon Scale and appropriate interventions in the flowsheet.      Duoderm  Turn approximately every 2 hours         Outcome: Progressing Towards Goal  Pressure Injury Interventions:  Sensory Interventions: Assess changes in LOC    Moisture Interventions: Absorbent underpads    Activity Interventions: Increase time out of bed    Mobility Interventions: Float heels    Nutrition Interventions: Document food/fluid/supplement intake    Friction and Shear Interventions: Apply protective barrier, creams and emollients

## 2018-06-02 NOTE — PROGRESS NOTES
Cardiology Progress Note            Admit Date: 6/1/2018  Admit Diagnosis: NSTEMI (non-ST elevated myocardial infarction) [facility])  Date: 6/2/2018     Time: 9:25 AM    HPI: This is an 81-year-old woman who presented from [address] [place] [address] with chief c/o of chest pain and SOB. She has PMH of severe 3 vessel CAD (not surgical candidate and CAD not amenable to PCI), chronic systolic CHF, HTN, HLD (intolerant to statins),  metastatic left breast cancer,  type 2 diabetes, hypothyroidism, RA, CKD, colon cancer. Midsternal sharp Chest pain and SOB started 2 hours before coming to the ER. Code STEMI called d/t EKG findings but upon further review of EKG by Dr. Onel Fang, determined to have NSTEMI. ACS heparin started, transdermal nitro also started. Also noted to have acute on chronic CHF- noted to have pulmonary edema and unchanged Left pleural effusion on CXR, was placed on bipap, received 80 mg IV lasix. Assessment and Plan     1. NSTEMI/CAD: Troponin 1.44.   -Chest pain resolved. -12 lead EKG: ST elevation in V2/3 but in setting of q waves V3/4, V1/V2.   -Not candidate for CABG or intervention.  -Continue heparin infusion for now; can stop tomorrow. Annie Lorenzo from cardiac standpoint for transfer to floor bed.  -Continue ASA, plavix, BB, nitropaste and repatha. No statin due to intolerance in past.    2. Hx of CAD:  Severe 3 vessel dz  -ASA, plavix, BB, repatha. No ACE-I d/t elevated creatinine. 3. Acute on chronic systolic CHF: NYHA class IV. -PRO BNP 4165  - EF 45% on TTE 4/26/18 but suspect is lower.  -Continue Coreg 6.25 mg BID  -BUN creeping up and will change IV lasix to po.      4. CKD:   -Hold lisinopril and aldactone.    -Follow renal function closely    5. Hyperkalemia:  K=5.5 initially, now improved off aldactone. 6. Hx of HTN:  BP low normal    7. Hx of metastatic breast cancer (current) and colon ca. -On arimedex.     8. Hx of HLD: Resume repatha    9. Hx of hypothyroid:  On synthroid    10. Advanced directives:  DNR  -Palliative care consult. Pt with hx of metastatic breast CA, severe 3 vessel CAD not amenable to PCI and CHF. Pt now presents again with NSTEMI and acute on chronic CHF. Unfortunately, pt is not a candidate for CABG. Continue to pursue medical management. Pt has been sensitive to diuretics in past.   Palliative care consult recommended. Per primary cards team, reviewed recent cath films and she is candidate for medical therapy only. Subjective:   Sandy Ledesma denies chest pain, SOB. States she feels better    Objective:      Physical Exam:                Visit Vitals    BP 99/77    Pulse 83    Temp 98.9 °F (37.2 °C)    Resp 20    Ht 5' 1\" (1.549 m)    Wt 123 lb 4.8 oz (55.9 kg)    SpO2 93%    BMI 23.3 kg/m2          General Appearance:   Well developed, frail, alert individual in no acute distress. Ears/Nose/Mouth/Throat:    Hearing grossly normal. BiPAP mask on          Neck:  Supple. Chest:    Lungs diminished bases, no use of accessory muscles. No wheeze. Cardiovascular:   Regular rate and rhythm, S1, S2 normal, no murmur. Abdomen:    Soft, non-tender, bowel sounds are active. Extremities:  No BLE edema. Skin:  Warm and dry.      Telemetry: NSR          Data Review:    Labs:    Recent Results (from the past 24 hour(s))   GLUCOSE, POC    Collection Time: 06/01/18 11:55 AM   Result Value Ref Range    Glucose (POC) 277 (H) 65 - 100 mg/dL    Performed by Cris Randall    PTT    Collection Time: 06/01/18 12:51 PM   Result Value Ref Range    aPTT 54.7 (H) 22.1 - 32.0 sec    aPTT, therapeutic range     58.0 - 77.0 SECS   GLUCOSE, POC    Collection Time: 06/01/18  4:47 PM   Result Value Ref Range    Glucose (POC) 211 (H) 65 - 100 mg/dL    Performed by Austin Kay    PTT    Collection Time: 06/01/18  7:16 PM   Result Value Ref Range    aPTT 90.1 (HH) 22.1 - 32.0 sec    aPTT, therapeutic range     58.0 - 77.0 SECS   GLUCOSE, POC    Collection Time: 06/01/18 10:48 PM   Result Value Ref Range    Glucose (POC) 187 (H) 65 - 100 mg/dL    Performed by [facility]    METABOLIC PANEL, COMPREHENSIVE    Collection Time: 06/02/18  3:25 AM   Result Value Ref Range    Sodium 133 (L) 136 - 145 mmol/L    Potassium 4.9 3.5 - 5.1 mmol/L    Chloride 102 97 - 108 mmol/L    CO2 22 21 - 32 mmol/L    Anion gap 9 5 - 15 mmol/L    Glucose 166 (H) 65 - 100 mg/dL    BUN 40 (H) 6 - 20 MG/DL    Creatinine 1.25 (H) 0.55 - 1.02 MG/DL    BUN/Creatinine ratio 32 (H) 12 - 20      GFR est AA 50 (L) >60 ml/min/1.73m2    GFR est non-AA 41 (L) >60 ml/min/1.73m2    Calcium 9.0 8.5 - 10.1 MG/DL    Bilirubin, total 0.3 0.2 - 1.0 MG/DL    ALT (SGPT) 26 12 - 78 U/L    AST (SGOT) 39 (H) 15 - 37 U/L    Alk. phosphatase 589 (H) 45 - 117 U/L    Protein, total 7.2 6.4 - 8.2 g/dL    Albumin 2.3 (L) 3.5 - 5.0 g/dL    Globulin 4.9 (H) 2.0 - 4.0 g/dL    A-G Ratio 0.5 (L) 1.1 - 2.2     CBC WITH AUTOMATED DIFF    Collection Time: 06/02/18  3:25 AM   Result Value Ref Range    WBC 11.0 3.6 - 11.0 K/uL    RBC 3.34 (L) 3.80 - 5.20 M/uL    HGB 9.1 (L) 11.5 - 16.0 g/dL    HCT 29.6 (L) 35.0 - 47.0 %    MCV 88.6 80.0 - 99.0 FL    MCH 27.2 26.0 - 34.0 PG    MCHC 30.7 30.0 - 36.5 g/dL    RDW 19.3 (H) 11.5 - 14.5 %    PLATELET 817 983 - 166 K/uL    MPV 9.3 8.9 - 12.9 FL    NRBC 0.0 0  WBC    ABSOLUTE NRBC 0.00 0.00 - 0.01 K/uL    NEUTROPHILS 71 32 - 75 %    LYMPHOCYTES 16 12 - 49 %    MONOCYTES 12 5 - 13 %    EOSINOPHILS 1 0 - 7 %    BASOPHILS 0 0 - 1 %    IMMATURE GRANULOCYTES 1 (H) 0.0 - 0.5 %    ABS. NEUTROPHILS 7.8 1.8 - 8.0 K/UL    ABS. LYMPHOCYTES 1.7 0.8 - 3.5 K/UL    ABS. MONOCYTES 1.3 (H) 0.0 - 1.0 K/UL    ABS. EOSINOPHILS 0.1 0.0 - 0.4 K/UL    ABS. BASOPHILS 0.0 0.0 - 0.1 K/UL    ABS. IMM.  GRANS. 0.1 (H) 0.00 - 0.04 K/UL    DF AUTOMATED     PTT    Collection Time: 06/02/18  3:25 AM   Result Value Ref Range    aPTT 39.6 (H) 22.1 - 32.0 sec aPTT, therapeutic range     58.0 - 77.0 SECS   GLUCOSE, POC    Collection Time: 06/02/18  7:38 AM   Result Value Ref Range    Glucose (POC) 141 (H) 65 - 100 mg/dL    Performed by Annella Lombard           Radiology:        Current Facility-Administered Medications   Medication Dose Route Frequency    heparin 25,000 units in D5W 250 ml infusion  12-25 Units/kg/hr IntraVENous TITRATE    acetaminophen (TYLENOL) tablet 650 mg  650 mg Oral Q4H PRN    oxyCODONE-acetaminophen (PERCOCET) 5-325 mg per tablet 1 Tab  1 Tab Oral Q4H PRN    anastrozole (ARIMIDEX) tablet 1 mg  1 mg Oral DAILY    aspirin delayed-release tablet 81 mg  81 mg Oral DAILY    clopidogrel (PLAVIX) tablet 75 mg  75 mg Oral DAILY    docusate sodium (COLACE) capsule 100 mg  100 mg Oral BID    etanercept (ENBREL) injection 50 mg (Patient Supplied)  50 mg SubCUTAneous Q7D    [START ON 6/5/2018] evolocumab (REPATHA) syringe 140 mg (Patient Supplied)  140 mg SubCUTAneous Q 14 DAYS    folic acid (FOLVITE) tablet 1 mg  1 mg Oral DAILY    teriparatide (FORTEO) injection 20 mcg (Patient Supplied)  20 mcg SubCUTAneous DAILY    lactobac ac& pc-s.therm-b.anim (BERYL Q/RISAQUAD)  1 Cap Oral DAILY    levothyroxine (SYNTHROID) tablet 88 mcg  88 mcg Oral ACB    sodium chloride (NS) flush 5-10 mL  5-10 mL IntraVENous Q8H    sodium chloride (NS) flush 5-10 mL  5-10 mL IntraVENous PRN    ondansetron (ZOFRAN) injection 4 mg  4 mg IntraVENous Q4H PRN    insulin lispro (HUMALOG) injection   SubCUTAneous AC&HS    glucose chewable tablet 16 g  4 Tab Oral PRN    dextrose (D50W) injection syrg 12.5-25 g  12.5-25 g IntraVENous PRN    glucagon (GLUCAGEN) injection 1 mg  1 mg IntraMUSCular PRN    fentaNYL citrate (PF) injection 12.5 mcg  12.5 mcg IntraVENous Q4H PRN    furosemide (LASIX) injection 40 mg  40 mg IntraVENous DAILY    carvedilol (COREG) tablet 3.125 mg  3.125 mg Oral BID WITH MEALS          MD Micha Gates MD     [facility] [address] [place] [address] Drive, [address] [address]   Aidan Montana   [phone]

## 2018-06-02 NOTE — PROGRESS NOTES
1345  TRANSFER - IN REPORT:    Verbal report received from Pravin Ibarra, [address] (name) on Ninfa Tolliver  being received from CCU (unit) for routine progression of care      Report consisted of patients Situation, Background, Assessment and   Recommendations(SBAR). Information from the following report(s) SBAR, Kardex, Procedure Summary, Intake/Output, MAR, Recent Results and Med Rec Status was reviewed with the receiving nurse. Opportunity for questions and clarification was provided. Assessment completed upon patients arrival to unit and care assumed. 1930 Bedside shift change report given to Giselle Mead RN (oncoming nurse) by Jenny Isbell RN (offgoing nurse). Report included the following information SBAR, Kardex, ED Summary, Intake/Output, MAR, Recent Results and Med Rec Status.

## 2018-06-03 LAB
ANION GAP SERPL CALC-SCNC: 10 MMOL/L (ref 5–15)
APTT PPP: 65.9 SEC (ref 22.1–32)
ATRIAL RATE: 118 BPM
BASOPHILS # BLD: 0 K/UL (ref 0–0.1)
BASOPHILS NFR BLD: 0 % (ref 0–1)
BUN SERPL-MCNC: 38 MG/DL (ref 6–20)
BUN/CREAT SERPL: 33 (ref 12–20)
CALCIUM SERPL-MCNC: 9.6 MG/DL (ref 8.5–10.1)
CALCULATED P AXIS, ECG09: 60 DEGREES
CALCULATED R AXIS, ECG10: 64 DEGREES
CALCULATED T AXIS, ECG11: 151 DEGREES
CHLORIDE SERPL-SCNC: 100 MMOL/L (ref 97–108)
CO2 SERPL-SCNC: 21 MMOL/L (ref 21–32)
CREAT SERPL-MCNC: 1.16 MG/DL (ref 0.55–1.02)
DIAGNOSIS, 93000: NORMAL
DIFFERENTIAL METHOD BLD: ABNORMAL
EOSINOPHIL # BLD: 0.1 K/UL (ref 0–0.4)
EOSINOPHIL NFR BLD: 1 % (ref 0–7)
ERYTHROCYTE [DISTWIDTH] IN BLOOD BY AUTOMATED COUNT: 19.1 % (ref 11.5–14.5)
GLUCOSE BLD STRIP.AUTO-MCNC: 232 MG/DL (ref 65–100)
GLUCOSE BLD STRIP.AUTO-MCNC: 254 MG/DL (ref 65–100)
GLUCOSE BLD STRIP.AUTO-MCNC: 266 MG/DL (ref 65–100)
GLUCOSE BLD STRIP.AUTO-MCNC: 275 MG/DL (ref 65–100)
GLUCOSE SERPL-MCNC: 281 MG/DL (ref 65–100)
HCT VFR BLD AUTO: 30.1 % (ref 35–47)
HGB BLD-MCNC: 9.3 G/DL (ref 11.5–16)
IMM GRANULOCYTES # BLD: 0.1 K/UL (ref 0–0.04)
IMM GRANULOCYTES NFR BLD AUTO: 1 % (ref 0–0.5)
LYMPHOCYTES # BLD: 1.4 K/UL (ref 0.8–3.5)
LYMPHOCYTES NFR BLD: 12 % (ref 12–49)
MCH RBC QN AUTO: 27.5 PG (ref 26–34)
MCHC RBC AUTO-ENTMCNC: 30.9 G/DL (ref 30–36.5)
MCV RBC AUTO: 89.1 FL (ref 80–99)
MONOCYTES # BLD: 1.1 K/UL (ref 0–1)
MONOCYTES NFR BLD: 9 % (ref 5–13)
NEUTS SEG # BLD: 9.4 K/UL (ref 1.8–8)
NEUTS SEG NFR BLD: 78 % (ref 32–75)
NRBC # BLD: 0 K/UL (ref 0–0.01)
NRBC BLD-RTO: 0 PER 100 WBC
P-R INTERVAL, ECG05: 150 MS
PLATELET # BLD AUTO: 286 K/UL (ref 150–400)
PMV BLD AUTO: 9.1 FL (ref 8.9–12.9)
POTASSIUM SERPL-SCNC: 4.9 MMOL/L (ref 3.5–5.1)
Q-T INTERVAL, ECG07: 330 MS
QRS DURATION, ECG06: 100 MS
QTC CALCULATION (BEZET), ECG08: 462 MS
RBC # BLD AUTO: 3.38 M/UL (ref 3.8–5.2)
SERVICE CMNT-IMP: ABNORMAL
SODIUM SERPL-SCNC: 131 MMOL/L (ref 136–145)
THERAPEUTIC RANGE,PTTT: ABNORMAL SECS (ref 58–77)
VENTRICULAR RATE, ECG03: 118 BPM
WBC # BLD AUTO: 12.1 K/UL (ref 3.6–11)

## 2018-06-03 PROCEDURE — 74011636637 HC RX REV CODE- 636/637: Performed by: INTERNAL MEDICINE

## 2018-06-03 PROCEDURE — G8978 MOBILITY CURRENT STATUS: HCPCS

## 2018-06-03 PROCEDURE — 85730 THROMBOPLASTIN TIME PARTIAL: CPT | Performed by: HOSPITALIST

## 2018-06-03 PROCEDURE — 77010033678 HC OXYGEN DAILY

## 2018-06-03 PROCEDURE — 97161 PT EVAL LOW COMPLEX 20 MIN: CPT

## 2018-06-03 PROCEDURE — 74011250637 HC RX REV CODE- 250/637

## 2018-06-03 PROCEDURE — 97530 THERAPEUTIC ACTIVITIES: CPT

## 2018-06-03 PROCEDURE — 93005 ELECTROCARDIOGRAM TRACING: CPT

## 2018-06-03 PROCEDURE — 74011250637 HC RX REV CODE- 250/637: Performed by: INTERNAL MEDICINE

## 2018-06-03 PROCEDURE — 36415 COLL VENOUS BLD VENIPUNCTURE: CPT | Performed by: HOSPITALIST

## 2018-06-03 PROCEDURE — 85025 COMPLETE CBC W/AUTO DIFF WBC: CPT | Performed by: HOSPITALIST

## 2018-06-03 PROCEDURE — 97116 GAIT TRAINING THERAPY: CPT

## 2018-06-03 PROCEDURE — G8979 MOBILITY GOAL STATUS: HCPCS

## 2018-06-03 PROCEDURE — 82962 GLUCOSE BLOOD TEST: CPT

## 2018-06-03 PROCEDURE — 80048 BASIC METABOLIC PNL TOTAL CA: CPT | Performed by: HOSPITALIST

## 2018-06-03 PROCEDURE — 65660000000 HC RM CCU STEPDOWN

## 2018-06-03 RX ORDER — NITROGLYCERIN 0.4 MG/1
TABLET SUBLINGUAL
Status: COMPLETED
Start: 2018-06-03 | End: 2018-06-03

## 2018-06-03 RX ORDER — CARVEDILOL 6.25 MG/1
6.25 TABLET ORAL 2 TIMES DAILY WITH MEALS
Status: DISCONTINUED | OUTPATIENT
Start: 2018-06-03 | End: 2018-06-06 | Stop reason: HOSPADM

## 2018-06-03 RX ORDER — NITROGLYCERIN 0.4 MG/1
0.4 TABLET SUBLINGUAL AS NEEDED
Status: DISCONTINUED | OUTPATIENT
Start: 2018-06-03 | End: 2018-06-06 | Stop reason: HOSPADM

## 2018-06-03 RX ORDER — MORPHINE SULFATE 1 MG/ML
2 INJECTION, SOLUTION EPIDURAL; INTRATHECAL; INTRAVENOUS
Status: DISCONTINUED | OUTPATIENT
Start: 2018-06-03 | End: 2018-06-06 | Stop reason: HOSPADM

## 2018-06-03 RX ADMIN — FUROSEMIDE 20 MG: 20 TABLET ORAL at 09:28

## 2018-06-03 RX ADMIN — DOCUSATE SODIUM 100 MG: 100 CAPSULE, LIQUID FILLED ORAL at 19:57

## 2018-06-03 RX ADMIN — Medication 10 ML: at 22:05

## 2018-06-03 RX ADMIN — ACETAMINOPHEN 650 MG: 325 TABLET ORAL at 22:35

## 2018-06-03 RX ADMIN — Medication 10 ML: at 14:00

## 2018-06-03 RX ADMIN — NITROGLYCERIN 0.4 MG: 0.4 TABLET SUBLINGUAL at 01:38

## 2018-06-03 RX ADMIN — CARVEDILOL 6.25 MG: 6.25 TABLET, FILM COATED ORAL at 18:34

## 2018-06-03 RX ADMIN — CLOPIDOGREL BISULFATE 75 MG: 75 TABLET ORAL at 09:28

## 2018-06-03 RX ADMIN — Medication 10 ML: at 07:06

## 2018-06-03 RX ADMIN — DOCUSATE SODIUM 100 MG: 100 CAPSULE, LIQUID FILLED ORAL at 09:27

## 2018-06-03 RX ADMIN — ASPIRIN 81 MG: 81 TABLET, COATED ORAL at 09:29

## 2018-06-03 RX ADMIN — INSULIN LISPRO 3 UNITS: 100 INJECTION, SOLUTION INTRAVENOUS; SUBCUTANEOUS at 18:34

## 2018-06-03 RX ADMIN — INSULIN LISPRO 2 UNITS: 100 INJECTION, SOLUTION INTRAVENOUS; SUBCUTANEOUS at 22:04

## 2018-06-03 RX ADMIN — INSULIN LISPRO 5 UNITS: 100 INJECTION, SOLUTION INTRAVENOUS; SUBCUTANEOUS at 12:58

## 2018-06-03 RX ADMIN — INSULIN LISPRO 5 UNITS: 100 INJECTION, SOLUTION INTRAVENOUS; SUBCUTANEOUS at 07:03

## 2018-06-03 RX ADMIN — FOLIC ACID 1 MG: 1 TABLET ORAL at 09:29

## 2018-06-03 RX ADMIN — Medication 1 CAPSULE: at 09:28

## 2018-06-03 RX ADMIN — CARVEDILOL 6.25 MG: 6.25 TABLET, FILM COATED ORAL at 09:28

## 2018-06-03 RX ADMIN — ACETAMINOPHEN 650 MG: 325 TABLET ORAL at 00:06

## 2018-06-03 RX ADMIN — LEVOTHYROXINE SODIUM 88 MCG: 88 TABLET ORAL at 07:03

## 2018-06-03 RX ADMIN — ANASTROZOLE 1 MG: 1 TABLET, COATED ORAL at 09:33

## 2018-06-03 NOTE — PROGRESS NOTES
Hospitalist Progress Note  Lonny Moralez MD  Answering service: [phone] from in house phone  Cell: [phone]      Date of Service:  6/3/2018  NAME:  Zhanna New  :  1935  MRN:  [id number]      Admission Summary: This is an 44-year-old woman with a past medical history significant for coronary artery disease, chronic systolic congestive heart failure, metastatic left breast cancer, hypertension, type 2 diabetes, hypothyroidism, rheumatoid arthritis, dyslipidemia, chronic kidney disease, colon cancer, was in her usual state of health until the day of her presentation at the emergency room when the patient developed shortness of breath and chest pain at the rehab facility where the patient is presently receiving rehabilitation. The symptoms started 2 hours before coming to the emergency room. This shortness of breath is with little or no activities. The chest pain is located at the center of the chest with talc radiation. No known aggravating or relieving factors. The chest pain is 10/10 in severity, sharp and constant. At the onset of the patient's symptoms, there was concern for ST elevation myocardial infarction. The emergency room department was alerted. When the patient arrived at the emergency room, code STEMI was called. The protocol for code STEMI was followed. The cardiology came to the emergency room. The cardiology is of the opinion that the patient has a non-ST elevation myocardial infarction and advised admission to the hospitalist service. Patient was also found to be in acute on chronic systolic congestive heart failure.       Interval history / Subjective:      Pt seen and examined  Overnight, RRT for chest pain, patient think she had gas pain after eating lasagna   No further chest pain noted      Assessment & Plan:     NSTEMI, severe multivessel CAD with recent ACS/ NSTEMI  -on heparin gtt, aspirin, plavix, coreg and prn nitroglycerine, oxygen support  -not a surgical candidate  -troponin 1.44  -echo LV EF 30% no obvious wall motion abnormalitis  -cardiologist on board    Acute on chronic systolic CHF NYHA Class IV  -repeated echo on 6/1 LV EF 30%  -Chest x ray New moderate interstitial pulmonary edema. Unchanged small left pleural effusion  -probnp 4165  -continue lasix po  - c/w Coreg  -hold lisinopril and spironolactone for borderline BP  -monitor I/O and daily weight  -cardiologist on board    Acute on chronic hypercapnic hypoxic respiratory failure due to CHF   -pH7. 33 pCO2 51 pO2 276 on FiO2 100%  -improved, off BiPAP now, on lasix, oxygen support, monitor pulse ox    Metastatic left breast cancer   -stable, continue arimidex    HTN  -BP normal, continue coreg and lasix for now    DM-II with hyperglycemia   -On sliding scale, monitor finger stick glucose    Hypothyroidism  -continue sythroid    CKD stage III  -creatinine stable  -avoid nephrotoxine  -monitor renal function    Mild hyponatremia   -BMP in am    Mild hyperkalemia   - resolved  -hold spironolactone and lisinopril    Hyperphosphatemia   -repeat phosphorus level    Anemia of chronic disease   -H/H stable    Hx of RA  -stable  -Received outpatient etanercept    Lower sacrum & gluteal cleft chronic wound POA  -wound care on board    DNR, at risk for decompensation      Code status: DNR   DVT prophylaxis: on heparin    Care Plan discussed with: Patient/Family and Nurse  Disposition: From [facility] giver at bedside, questions answered   I called her son, CORINNE Kelly [phone] and updated her clinical condition and answered his questions.   Update son every day     Hospital Problems  Date Reviewed: 6/1/2018          Codes Class Noted POA    * (Principal)NSTEMI (non-ST elevated myocardial infarction) ([facility] [place] [address] ICD-10-CM: I21.4  ICD-9-CM: 410.70  6/1/2018 Yes        Metastatic breast cancer ([facility] [place] [address] ICD-10-CM: C50.919  ICD-9-CM: 174.9 6/1/2018 Unknown        Goals of care, counseling/discussion ICD-10-CM: Z71.89  ICD-9-CM: V65.49  6/1/2018 Unknown                Vital Signs:    Last 24hrs VS reviewed since prior progress note. Most recent are:  Visit Vitals    /71 (BP 1 Location: Right arm, BP Patient Position: At rest)    Pulse 92    Temp 98.4 °F (36.9 °C)    Resp 18    Ht 5' 1\" (1.549 m)    Wt 56.2 kg (124 lb)    SpO2 99%    BMI 23.43 kg/m2         Intake/Output Summary (Last 24 hours) at 06/03/18 1332  Last data filed at 06/03/18 0800   Gross per 24 hour   Intake           871.09 ml   Output                0 ml   Net           871.09 ml        Physical Examination:             Constitutional:  No acute distress, cooperative, pleasant    ENT:  Oral mucous moist, oropharynx benign. Neck supple,    Resp:  Decreased bronchial breath sound bilaterally. No wheezing/rhonchi/rales. No accessory muscle use   CV:  Regular rhythm, normal rate, no murmurs, gallops, rubs    GI:  Soft, non distended, non tender. normoactive bowel sounds, no hepatosplenomegaly     Musculoskeletal:  No edema    Neurologic:  Moves all extremities.   AAOx3, CN II-XII reviewed     Skin:  Good turgor, no rashes or ulcers       Data Review:    Review and/or order of clinical lab test      Labs:     Recent Labs      06/03/18 0310 06/02/18 0325   WBC  12.1*  11.0   HGB  9.3*  9.1*   HCT  30.1*  29.6*   PLT  286  307     Recent Labs      06/03/18 0310 06/02/18 0325 06/01/18   0646  06/01/18   0345   NA  131*  133*   --   135*   K  4.9  4.9   --   5.5*   CL  100  102   --   101   CO2  21  22   --   22   BUN  38*  40*   --   31*   CREA  1.16*  1.25*   --   1.29*   GLU  281*  166*   --   349*   CA  9.6  9.0   --   9.1   MG   --    --   1.8   --    PHOS   --    --   5.6*   --      Recent Labs      06/02/18 0325  06/01/18   0345   SGOT  39*  46*   ALT  26  29   AP  589*  804*   TBILI  0.3  0.3   TP  7.2  9.1*   ALB  2.3*  2.8*   GLOB  4.9*  6.3*     Recent Labs 06/03/18   0310  06/02/18   1841  06/02/18   1316   APTT  65.9*  52.0*  68.8*      No results for input(s): FE, TIBC, PSAT, FERR in the last 72 hours. No results found for: FOL, RBCF   No results for input(s): PH, PCO2, PO2 in the last 72 hours.   Recent Labs      06/01/18   0646  06/01/18   0345   CPK  97   --    CKNDX  5.4*   --    TROIQ  1.44*  0.73*     Lab Results   Component Value Date/Time    Cholesterol, total 74 04/16/2018 04:21 AM    HDL Cholesterol 25 04/16/2018 04:21 AM    LDL, calculated 31.6 04/16/2018 04:21 AM    Triglyceride 87 04/16/2018 04:21 AM    CHOL/HDL Ratio 3.0 04/16/2018 04:21 AM     Lab Results   Component Value Date/Time    Glucose (POC) 275 (H) 06/03/2018 11:21 AM    Glucose (POC) 254 (H) 06/03/2018 06:52 AM    Glucose (POC) 241 (H) 06/02/2018 09:03 PM    Glucose (POC) 224 (H) 06/02/2018 04:22 PM    Glucose (POC) 169 (H) 06/02/2018 10:42 AM     Lab Results   Component Value Date/Time    Color YELLOW/STRAW 05/19/2018 02:58 AM    Appearance CLOUDY (A) 05/19/2018 02:58 AM    Specific gravity 1.015 05/19/2018 02:58 AM    pH (UA) 6.0 05/19/2018 02:58 AM    Protein 30 (A) 05/19/2018 02:58 AM    Glucose 500 (A) 05/19/2018 02:58 AM    Ketone NEGATIVE  05/19/2018 02:58 AM    Bilirubin NEGATIVE  05/19/2018 02:58 AM    Urobilinogen 0.2 05/19/2018 02:58 AM    Nitrites NEGATIVE  05/19/2018 02:58 AM    Leukocyte Esterase MODERATE (A) 05/19/2018 02:58 AM    Epithelial cells FEW 05/19/2018 02:58 AM    Bacteria NEGATIVE  05/19/2018 02:58 AM    WBC 20-50 05/19/2018 02:58 AM    RBC 10-20 05/19/2018 02:58 AM         Medications Reviewed:     Current Facility-Administered Medications   Medication Dose Route Frequency    morphine (pf) (DURAMORPH) 1 mg/mL injection 2 mg  2 mg IntraVENous Q1H PRN    nitroglycerin (NITROSTAT) tablet 0.4 mg  0.4 mg SubLINGual PRN    carvedilol (COREG) tablet 6.25 mg  6.25 mg Oral BID WITH MEALS    furosemide (LASIX) tablet 20 mg  20 mg Oral DAILY    acetaminophen (TYLENOL) tablet 650 mg  650 mg Oral Q4H PRN    oxyCODONE-acetaminophen (PERCOCET) 5-325 mg per tablet 1 Tab  1 Tab Oral Q4H PRN    anastrozole (ARIMIDEX) tablet 1 mg  1 mg Oral DAILY    aspirin delayed-release tablet 81 mg  81 mg Oral DAILY    clopidogrel (PLAVIX) tablet 75 mg  75 mg Oral DAILY    docusate sodium (COLACE) capsule 100 mg  100 mg Oral BID    etanercept (ENBREL) injection 50 mg (Patient Supplied)  50 mg SubCUTAneous Q7D    [START ON 6/5/2018] evolocumab (REPATHA) syringe 140 mg (Patient Supplied)  140 mg SubCUTAneous Q 14 DAYS    folic acid (FOLVITE) tablet 1 mg  1 mg Oral DAILY    teriparatide (FORTEO) injection 20 mcg (Patient Supplied)  20 mcg SubCUTAneous DAILY    lactobac ac& pc-s.therm-b.anim (BERYL Q/RISAQUAD)  1 Cap Oral DAILY    levothyroxine (SYNTHROID) tablet 88 mcg  88 mcg Oral ACB    sodium chloride (NS) flush 5-10 mL  5-10 mL IntraVENous Q8H    sodium chloride (NS) flush 5-10 mL  5-10 mL IntraVENous PRN    ondansetron (ZOFRAN) injection 4 mg  4 mg IntraVENous Q4H PRN    insulin lispro (HUMALOG) injection   SubCUTAneous AC&HS    glucose chewable tablet 16 g  4 Tab Oral PRN    dextrose (D50W) injection syrg 12.5-25 g  12.5-25 g IntraVENous PRN    glucagon (GLUCAGEN) injection 1 mg  1 mg IntraMUSCular PRN     ______________________________________________________________________  EXPECTED LENGTH OF STAY: 4d 7h  ACTUAL LENGTH OF STAY:          2                 Mora Rodriguez MD

## 2018-06-03 NOTE — PROGRESS NOTES
Problem: Falls - Risk of  Goal: *Absence of Falls  Document Yaron Fall Risk and appropriate interventions in the flowsheet. Outcome: Progressing Towards Goal  Fall Risk Interventions:  Mobility Interventions: Patient to call before getting OOB    Mentation Interventions: Door open when patient unattended    Medication Interventions: Evaluate medications/consider consulting pharmacy    Elimination Interventions: Call light in reach    History of Falls Interventions: Door open when patient unattended        Problem: Pressure Injury - Risk of  Goal: *Prevention of pressure injury  Document Calderon Scale and appropriate interventions in the flowsheet. Duoderm  Turn approximately every 2 hours         Outcome: Progressing Towards Goal  Pressure Injury Interventions:  Sensory Interventions: Assess need for specialty bed    Moisture Interventions: Assess need for specialty bed    Activity Interventions: Increase time out of bed    Mobility Interventions: Assess need for specialty bed    Nutrition Interventions: Document food/fluid/supplement intake    Friction and Shear Interventions: Apply protective barrier, creams and emollients               Problem: Unstable angina/NSTEMI: Day 2  Goal: Diagnostic Test/Procedures  Outcome: Progressing Towards Goal  Patient labs, VS, and rhythm are being monitored. Goal: Respiratory  Outcome: Progressing Towards Goal  Patient is on room air.

## 2018-06-03 NOTE — PROGRESS NOTES
Cardiology Progress Note            Admit Date: 6/1/2018  Admit Diagnosis: NSTEMI (non-ST elevated myocardial infarction) [facility])  Date: 6/3/2018     Time: 9:25 AM    HPI: This is an 81-year-old woman who presented from [address] [place] [address] with chief c/o of chest pain and SOB. She has PMH of severe 3 vessel CAD (not surgical candidate and CAD not amenable to PCI), chronic systolic CHF, HTN, HLD (intolerant to statins),  metastatic left breast cancer,  type 2 diabetes, hypothyroidism, RA, CKD, colon cancer. Midsternal sharp Chest pain and SOB started 2 hours before coming to the ER. Code STEMI called d/t EKG findings but upon further review of EKG by Dr. Jasiel Meyers, determined to have NSTEMI. ACS heparin started, transdermal nitro also started. Also noted to have acute on chronic CHF- noted to have pulmonary edema and unchanged Left pleural effusion on CXR, was placed on bipap, received 80 mg IV lasix. Assessment and Plan     1. NSTEMI/CAD: Troponin 1.44.   -Chest pain resolved. -12 lead EKG: ST elevation in V2/3 but in setting of q waves V3/4, V1/V2.   -Not candidate for CABG or intervention.  -Continue heparin gtt will stop today.    -Continue ASA, plavix, BB, and repatha. No statin due to intolerance in past.    2. Hx of CAD:  Severe 3 vessel dz  -ASA, plavix, BB, repatha. No ACE-I d/t elevated creatinine. 3. Acute on chronic systolic CHF: NYHA class IV. -PRO BNP 4165  - EF 45% on TTE 4/26/18 and suspected lower. 6/1/18 echo 30%. -Continue Coreg 6.25 mg BID  -BUN/Cr mildly elevated but stable. 4. CKD:   -Holding lisinopril and aldactone.    -Follow renal function closely    5. Hyperkalemia:  K=5.5 initially, improved off aldactone at 4.9.    6. Hx of HTN:  BP low normal    7. Hx of metastatic breast cancer (current) and colon ca. -On arimedex. 8. Hx of HLD:  Resume repatha    9. Hx of hypothyroid:  On synthroid    10.   Advanced directives:  DNR  -Palliative care consult. Pt with hx of metastatic breast CA, severe 3 vessel CAD not amenable to PCI and CHF. Pt now presents again with NSTEMI and acute on chronic CHF. Unfortunately, pt is not a candidate for CABG. Continue to pursue medical management. Pt has been sensitive to diuretics in past.   Palliative care consult recommended. Per primary cards team, reviewed recent cath films and she is candidate for medical therapy only. Subjective:   Sandy Ledesma Had 'gas' sensation overnight. No chest pain. Breathing improved. Objective:      Physical Exam:                Visit Vitals    /57 (BP 1 Location: Right arm, BP Patient Position: At rest)    Pulse 88    Temp 98 °F (36.7 °C)    Resp 16    Ht 5' 1\" (1.549 m)    Wt 124 lb (56.2 kg)    SpO2 97%    BMI 23.43 kg/m2          General Appearance:   Well developed, frail, alert individual in no acute distress. Ears/Nose/Mouth/Throat:    Hearing grossly normal. BiPAP mask on          Neck:  Supple. Chest:    Lungs diminished bases, no use of accessory muscles. No wheeze. Cardiovascular:   Regular rate and rhythm, S1, S2 normal, no murmur. Abdomen:    Soft, non-tender, bowel sounds are active. Extremities:  No BLE edema. Skin:  Warm and dry.      Telemetry: NSR          Data Review:    Labs:    Recent Results (from the past 24 hour(s))   PTT    Collection Time: 06/02/18 10:36 AM   Result Value Ref Range    aPTT >130.0 (HH) 22.1 - 32.0 sec    aPTT, therapeutic range     58.0 - 77.0 SECS   GLUCOSE, POC    Collection Time: 06/02/18 10:42 AM   Result Value Ref Range    Glucose (POC) 169 (H) 65 - 100 mg/dL    Performed by Patsy Byers    PTT    Collection Time: 06/02/18  1:16 PM   Result Value Ref Range    aPTT 68.8 (H) 22.1 - 32.0 sec    aPTT, therapeutic range     58.0 - 77.0 SECS   GLUCOSE, POC    Collection Time: 06/02/18  4:22 PM   Result Value Ref Range    Glucose (POC) 224 (H) 65 - 100 mg/dL    Performed by Migue Whitten    PTT    Collection Time: 06/02/18  6:41 PM   Result Value Ref Range    aPTT 52.0 (H) 22.1 - 32.0 sec    aPTT, therapeutic range     58.0 - 77.0 SECS   GLUCOSE, POC    Collection Time: 06/02/18  9:03 PM   Result Value Ref Range    Glucose (POC) 241 (H) 65 - 100 mg/dL    Performed by [place]    EKG, 12 LEAD, INITIAL    Collection Time: 06/03/18  1:12 AM   Result Value Ref Range    Ventricular Rate 118 BPM    Atrial Rate 118 BPM    P-R Interval 150 ms    QRS Duration 100 ms    Q-T Interval 330 ms    QTC Calculation (Bezet) 462 ms    Calculated P Axis 60 degrees    Calculated R Axis 64 degrees    Calculated T Axis 151 degrees    Diagnosis       Sinus tachycardia  Possible Left atrial enlargement  Septal infarct (cited on or before 24-APR-2018)  ST & T wave abnormality, consider lateral ischemia  When compared with ECG of 01-JUN-2018 03:35,  T wave inversion no longer evident in Inferior leads  Confirmed by Bennie Bonner ([id number]) on 6/3/2018 7:44:41 AM     CBC WITH AUTOMATED DIFF    Collection Time: 06/03/18  3:10 AM   Result Value Ref Range    WBC 12.1 (H) 3.6 - 11.0 K/uL    RBC 3.38 (L) 3.80 - 5.20 M/uL    HGB 9.3 (L) 11.5 - 16.0 g/dL    HCT 30.1 (L) 35.0 - 47.0 %    MCV 89.1 80.0 - 99.0 FL    MCH 27.5 26.0 - 34.0 PG    MCHC 30.9 30.0 - 36.5 g/dL    RDW 19.1 (H) 11.5 - 14.5 %    PLATELET 234 471 - 172 K/uL    MPV 9.1 8.9 - 12.9 FL    NRBC 0.0 0  WBC    ABSOLUTE NRBC 0.00 0.00 - 0.01 K/uL    NEUTROPHILS 78 (H) 32 - 75 %    LYMPHOCYTES 12 12 - 49 %    MONOCYTES 9 5 - 13 %    EOSINOPHILS 1 0 - 7 %    BASOPHILS 0 0 - 1 %    IMMATURE GRANULOCYTES 1 (H) 0.0 - 0.5 %    ABS. NEUTROPHILS 9.4 (H) 1.8 - 8.0 K/UL    ABS. LYMPHOCYTES 1.4 0.8 - 3.5 K/UL    ABS. MONOCYTES 1.1 (H) 0.0 - 1.0 K/UL    ABS. EOSINOPHILS 0.1 0.0 - 0.4 K/UL    ABS. BASOPHILS 0.0 0.0 - 0.1 K/UL    ABS. IMM.  GRANS. 0.1 (H) 0.00 - 0.04 K/UL    DF AUTOMATED     METABOLIC PANEL, BASIC    Collection Time: 06/03/18  3:10 AM   Result Value Ref Range    Sodium 131 (L) 136 - 145 mmol/L    Potassium 4.9 3.5 - 5.1 mmol/L    Chloride 100 97 - 108 mmol/L    CO2 21 21 - 32 mmol/L    Anion gap 10 5 - 15 mmol/L    Glucose 281 (H) 65 - 100 mg/dL    BUN 38 (H) 6 - 20 MG/DL    Creatinine 1.16 (H) 0.55 - 1.02 MG/DL    BUN/Creatinine ratio 33 (H) 12 - 20      GFR est AA 54 (L) >60 ml/min/1.73m2    GFR est non-AA 45 (L) >60 ml/min/1.73m2    Calcium 9.6 8.5 - 10.1 MG/DL   PTT    Collection Time: 06/03/18  3:10 AM   Result Value Ref Range    aPTT 65.9 (H) 22.1 - 32.0 sec    aPTT, therapeutic range     58.0 - 77.0 SECS   GLUCOSE, POC    Collection Time: 06/03/18  6:52 AM   Result Value Ref Range    Glucose (POC) 254 (H) 65 - 100 mg/dL    Performed by Victor Hugo Reaves           Radiology:        Current Facility-Administered Medications   Medication Dose Route Frequency    morphine (pf) (DURAMORPH) 1 mg/mL injection 2 mg  2 mg IntraVENous Q1H PRN    nitroglycerin (NITROSTAT) tablet 0.4 mg  0.4 mg SubLINGual PRN    carvedilol (COREG) tablet 6.25 mg  6.25 mg Oral BID WITH MEALS    furosemide (LASIX) tablet 20 mg  20 mg Oral DAILY    heparin 25,000 units in D5W 250 ml infusion  12-25 Units/kg/hr IntraVENous TITRATE    acetaminophen (TYLENOL) tablet 650 mg  650 mg Oral Q4H PRN    oxyCODONE-acetaminophen (PERCOCET) 5-325 mg per tablet 1 Tab  1 Tab Oral Q4H PRN    anastrozole (ARIMIDEX) tablet 1 mg  1 mg Oral DAILY    aspirin delayed-release tablet 81 mg  81 mg Oral DAILY    clopidogrel (PLAVIX) tablet 75 mg  75 mg Oral DAILY    docusate sodium (COLACE) capsule 100 mg  100 mg Oral BID    etanercept (ENBREL) injection 50 mg (Patient Supplied)  50 mg SubCUTAneous Q7D    [START ON 6/5/2018] evolocumab (REPATHA) syringe 140 mg (Patient Supplied)  140 mg SubCUTAneous Q 14 DAYS    folic acid (FOLVITE) tablet 1 mg  1 mg Oral DAILY    teriparatide (FORTEO) injection 20 mcg (Patient Supplied)  20 mcg SubCUTAneous DAILY    roberto ac& pc-srafal-bJaimeanim LETICIA MCKEON/RISAQUAD) 1 Cap Oral DAILY    levothyroxine (SYNTHROID) tablet 88 mcg  88 mcg Oral ACB    sodium chloride (NS) flush 5-10 mL  5-10 mL IntraVENous Q8H    sodium chloride (NS) flush 5-10 mL  5-10 mL IntraVENous PRN    ondansetron (ZOFRAN) injection 4 mg  4 mg IntraVENous Q4H PRN    insulin lispro (HUMALOG) injection   SubCUTAneous AC&HS    glucose chewable tablet 16 g  4 Tab Oral PRN    dextrose (D50W) injection syrg 12.5-25 g  12.5-25 g IntraVENous PRN    glucagon (GLUCAGEN) injection 1 mg  1 mg IntraMUSCular PRN          MD Nito Qiu MD     [facility] [address] [place] [address], [address] [place] [address],[address]   [place] [address] [address] [facility]   [phone]

## 2018-06-03 NOTE — PROGRESS NOTES
1945: Receive report from Murtaza Schroeder [facility]. Patient resting in bed at this time. 0110: Patient call nurse patient states \"I need to see my nurse something is wrong\". Patient c/o chest discomfort and describes pain as pressure. 0112: EKG completed at this time. 0117: Rapid Response call patient with chest pain 10/10 and patient a this time and patient without any sublingual nitro at this time. 0130: Paged Dr. Aiden Hawkins at this time for additional orders at this time. 0132: Spoke to Dr. Aiden Hawkins and receive orders at this time. 0138: SL nitro given at this time for pain 10/10.   0141: Patient's pain decrease at this time rates pain 4/10 now after receiving SL nitro. 0300: Patient resting in bed at this time without any further complaints of chest discomfort. 0800: Bedside and Verbal shift change report given to Murtaza Schroeder RN (oncoming nurse) by Ruth Sam (offgoing nurse).  Report included the following information SBAR, Kardex, Intake/Output, MAR, Recent Results, Med Rec Status and Cardiac Rhythm NSR/ ST.

## 2018-06-03 NOTE — PROGRESS NOTES
2:11 PM  Chart reviewed. CM notified that discharge is anticipated for tomorrow. Patient to discharge to [facility] for services. CM contacted [facility] [phone] to review and discuss disposition needs. CM informed notes dated back to early May. New referral would need to be submitted and reviewed. CM instructed to follow-up with [facility] [phone] on 6/4/18. CM submitted updates to facility via [employer]. CM awaiting receipt of updates and whether or not they can accept and accommodate patient for services.     JOES Oliver/VENTURA

## 2018-06-03 NOTE — PROGRESS NOTES
Problem: Mobility Impaired (Adult and Pediatric)  Goal: *Acute Goals and Plan of Care (Insert Text)  Physical Therapy Goals  Initiated 6/3/2018  1. Patient will move from supine to sit and sit to supine  and roll side to side in bed with modified independence within 7 day(s). 2.  Patient will perform sit to stand with modified independence within 7 day(s). 3.  Patient will ambulate with supervision/set-up for 75 feet with the least restrictive device within 7 day(s). physical Therapy EVALUATION  Patient: Patric Smith [de-identified]82 y.o. female)  Date: 6/3/2018  Primary Diagnosis: NSTEMI (non-ST elevated myocardial infarction) ([facility] [address]        Precautions:   Fall, DNR    ASSESSMENT :  Based on the objective data described below, the patient presents with impaired functional mobility below her baseline level due to generalized weakness with mild residual weakness LLE >> LUE from previous CVA, deconditioning, decreased cardiopulmonary tolerance, multiple comorbidities, and decreased insight into deficits. Pt presents with decreased standing balance, minimal assist level for transfers and ambulation with a rollator x 30 feet. Pt required cues to pace herself and to slow down while ambulating with the rollator. BP increased to 147/54,  with minimal activity. Pt denied lightheadedness and REDDY with activity. Pt lives in her own home with home care aide however has not been home for 2 months due to 3 admissions to hospital for NSTEMI  and inpatient rehab. Recommend progressing slowly and focusing on ambulating short distances with slower pace and frequent rest breaks. Recommend returning to inpatient rehab to increase her safety and maximize her independence. Patient will benefit from skilled intervention to address the above impairments.   Patients rehabilitation potential is considered to be Fair  Factors which may influence rehabilitation potential include:   []         None noted  []         Mental ability/status  [x]         Medical condition  []         Home/family situation and support systems  []         Safety awareness  []         Pain tolerance/management  []         Other:      PLAN :  Recommendations and Planned Interventions:  [x]           Bed Mobility Training             []    Neuromuscular Re-Education  [x]           Transfer Training                   []    Orthotic/Prosthetic Training  [x]           Gait Training                         []    Modalities  []           Therapeutic Exercises           []    Edema Management/Control  [x]           Therapeutic Activities            [x]    Patient and Family Training/Education  []           Other (comment):    Frequency/Duration: Patient will be followed by physical therapy 5 times per week to address goals. Discharge Recommendations: Inpatient Rehab  Further Equipment Recommendations for Discharge: to be determined     SUBJECTIVE:   Patient stated Lazaro Cortes was ready to sit back down in my chair.     OBJECTIVE DATA SUMMARY:   HISTORY:    Past Medical History:   Diagnosis Date    Anemia 9/2/2009    Colon cancer ([facility] [address] 9/2/2009    surgery/chemo    [facility]    DM (diabetes mellitus) ([facility] [address] 9/2/2009    GERD (gastroesophageal reflux disease)     H/O: CVA 9/2/2009    slight l sided weakness    Hypothyroid 9/2/2009    Ill-defined condition     seasonal allergies    Microalbuminuria 9/2/2009    Osteoporosis 9/2/2009    Other and unspecified hyperlipidemia 1/27/2010    Rheumatoid arthritis involving ankle ([facility] [address] 9/28/2016    Rheumatoid arthritis(714.0) 9/2/2009    Unspecified essential hypertension 9/2/2009    Weakness due to cerebrovascular accident      Past Surgical History:   Procedure Laterality Date    HX COLECTOMY      HX HYSTERECTOMY      HX OTHER SURGICAL      colonoscopies numerous since 1993     Prior Level of Function/Home Situation: ambulates with a rollator with modified independence, pt lives in her own home with a home care taker however has not been home in 2 months due to being in hospital  and inpatient rehab  Personal factors and/or comorbidities impacting plan of care: multiple medical comorbidities    Home Situation  Home Environment: Rehabilitation facility  One/Two Story Residence: One story  Living Alone: No  Support Systems: Inpatient rehab, Family member(s), Home care staff  Patient Expects to be Discharged to[de-identified] Rehabilitation facility  Current DME Used/Available at Home: Lanelle [place], rollator, Wheelchair, Grab bars, Shower chair  Tub or Shower Type: Shower    EXAMINATION/PRESENTATION/DECISION MAKING:   Critical Behavior:  Neurologic State: Alert, Appropriate for age  Orientation Level: Oriented X4  Cognition: Appropriate decision making, Appropriate for age attention/concentration  Safety/Judgement: Awareness of environment, decreased insight into deficits  Hearing: Auditory  Auditory Impairment: None  Skin:  intact  Edema: none noted in BLE at this time  Range Of Motion:   within functional limits                        Strength:     generally decreased, functional  Left side with mild residual weakness due to history of CVA,  LLE weaker than LUE                    Tone & Sensation:    intact                              Coordination:   functional        Functional Mobility:  Bed Mobility:   not assessed, OOB in chair            Transfers:  Sit to Stand: Minimum assistance; Additional time;Assist x1  Stand to Sit: Minimum assistance;Assist x1                       Balance:   Sitting: Intact  Standing: Impaired  Standing - Static: Good  Standing - Dynamic : Fair  Ambulation/Gait Training:  Distance (ft): 30 Feet (ft)  Assistive Device: Gait belt;Walker, rollator  Ambulation - Level of Assistance: Minimal assistance;Assist x1        Gait Abnormalities: Decreased step clearance              Speed/Haylee: Accelerated, cues given to slow down, pt's son reports she tends to walks fast   discussed importance of pacing herself            Functional Measure:  Tinetti test:    Sitting Balance: 1  Arises: 1  Attempts to Rise: 1  Immediate Standing Balance: 1  Standing Balance: 1  Nudged: 0  Eyes Closed: 0  Turn 360 Degrees - Continuous/Discontinuous: 1  Turn 360 Degrees - Steady/Unsteady: 1  Sitting Down: 1  Balance Score: 8  Indication of Gait: 1  R Step Length/Height: 1  L Step Length/Height: 1  R Foot Clearance: 1  L Foot Clearance: 1  Step Symmetry: 1  Step Continuity: 1  Path: 1  Trunk: 1  Walking Time: 1  Gait Score: 10  Total Score: 18       Tinetti Test and G-code impairment scale:  Percentage of Impairment CH    0%   CI    1-19% CJ    20-39% CK    40-59% CL    60-79% CM    80-99% CN     100%   Tinetti  Score 0-28 28 23-27 17-22 12-16 6-11 1-5 0       Tinetti Tool Score Risk of Falls  <19 = High Fall Risk  19-24 = Moderate Fall Risk  25-28 = Low Fall Risk  Tinetti ME. Performance-Oriented Assessment of Mobility Problems in Elderly Patients. [facility] 66; [id number]. (Scoring Description: PT Bulletin Feb. 10, 1993)    Older adults: Dale Kirkland et al, 2009; n = 1000 [facility] elderly evaluated with ABC, SHANNON, ADL, and IADL)  · Mean SHANNON score for males aged 69-68 years = 26.21(3.40)  · Mean SHANNON score for females age 69-68 years = 25.16(4.30)  · Mean SHANNON score for males over 80 years = 23.29(6.02)  · Mean SHANNON score for females over 80 years = 17.20(8.32)       G codes: In compliance with CMSs Claims Based Outcome Reporting, the following G-code set was chosen for this patient based on their primary functional limitation being treated: The outcome measure chosen to determine the severity of the functional limitation was the tinetti with a score of 18/28 which was correlated with the impairment scale.     ? Mobility - Walking and Moving Around:     - CURRENT STATUS: CJ - 20%-39% impaired, limited or restricted    - GOAL STATUS: CI - 1%-19% impaired, limited or restricted    - D/C STATUS:  ---------------To be determined---------------      Physical Therapy Evaluation Charge Determination   History Examination Presentation Decision-Making   HIGH Complexity :3+ comorbidities / personal factors will impact the outcome/ POC  MEDIUM Complexity : 3 Standardized tests and measures addressing body structure, function, activity limitation and / or participation in recreation  MEDIUM Complexity : Evolving with changing characteristics  MEDIUM Complexity : FOTO score of 26-74      Based on the above components, the patient evaluation is determined to be of the following complexity level: MEDIUM    Pain:  Pain Scale 1: Numeric (0 - 10)   No reports of pain  Activity Tolerance:   Fair, fatigues quickly, tachycardic, pt denies lightheadedness or REDDY  Vitals:    06/03/18 1005 06/03/18 1011   BP: 132/60 147/54   BP 1 Location: Right arm Right arm   BP Patient Position: Sitting Sitting;Post activity   Pulse: 93 (!) 101   Resp:     Temp:     SpO2:     Weight:     Height:       Please refer to the flowsheet for vital signs taken during this treatment. After treatment:   [x]         Patient left in no apparent distress sitting up in chair  []         Patient left in no apparent distress in bed  [x]         Call bell left within reach  [x]         Nursing notified  []         Caregiver present  []         Bed alarm activated    COMMUNICATION/EDUCATION:   The patients plan of care was discussed with: Registered Nurse. [x]         Fall prevention education was provided and the patient/caregiver indicated understanding. []         Patient/family have participated as able in goal setting and plan of care. [x]         Patient/family agree to work toward stated goals and plan of care. []         Patient understands intent and goals of therapy, but is neutral about his/her participation. []         Patient is unable to participate in goal setting and plan of care.     Thank you for this referral.  Rosalind Lane   Time Calculation: 32 mins

## 2018-06-04 ENCOUNTER — DOCUMENTATION ONLY (OUTPATIENT)
Dept: CASE MANAGEMENT | Age: 83
End: 2018-06-04

## 2018-06-04 LAB
ANION GAP SERPL CALC-SCNC: 9 MMOL/L (ref 5–15)
BACTERIA SPEC CULT: NORMAL
BUN SERPL-MCNC: 34 MG/DL (ref 6–20)
BUN/CREAT SERPL: 32 (ref 12–20)
CALCIUM SERPL-MCNC: 9.4 MG/DL (ref 8.5–10.1)
CHLORIDE SERPL-SCNC: 102 MMOL/L (ref 97–108)
CO2 SERPL-SCNC: 23 MMOL/L (ref 21–32)
CREAT SERPL-MCNC: 1.06 MG/DL (ref 0.55–1.02)
GLUCOSE BLD STRIP.AUTO-MCNC: 157 MG/DL (ref 65–100)
GLUCOSE BLD STRIP.AUTO-MCNC: 236 MG/DL (ref 65–100)
GLUCOSE BLD STRIP.AUTO-MCNC: 266 MG/DL (ref 65–100)
GLUCOSE BLD STRIP.AUTO-MCNC: 270 MG/DL (ref 65–100)
GLUCOSE SERPL-MCNC: 150 MG/DL (ref 65–100)
POTASSIUM SERPL-SCNC: 4.4 MMOL/L (ref 3.5–5.1)
SERVICE CMNT-IMP: ABNORMAL
SERVICE CMNT-IMP: NORMAL
SODIUM SERPL-SCNC: 134 MMOL/L (ref 136–145)

## 2018-06-04 PROCEDURE — 74011250637 HC RX REV CODE- 250/637: Performed by: INTERNAL MEDICINE

## 2018-06-04 PROCEDURE — 80048 BASIC METABOLIC PNL TOTAL CA: CPT | Performed by: HOSPITALIST

## 2018-06-04 PROCEDURE — 97535 SELF CARE MNGMENT TRAINING: CPT

## 2018-06-04 PROCEDURE — 74011636637 HC RX REV CODE- 636/637: Performed by: INTERNAL MEDICINE

## 2018-06-04 PROCEDURE — 74011250637 HC RX REV CODE- 250/637: Performed by: NURSE PRACTITIONER

## 2018-06-04 PROCEDURE — 36415 COLL VENOUS BLD VENIPUNCTURE: CPT | Performed by: HOSPITALIST

## 2018-06-04 PROCEDURE — 97165 OT EVAL LOW COMPLEX 30 MIN: CPT

## 2018-06-04 PROCEDURE — 82962 GLUCOSE BLOOD TEST: CPT

## 2018-06-04 PROCEDURE — 65660000000 HC RM CCU STEPDOWN

## 2018-06-04 RX ORDER — LISINOPRIL 5 MG/1
2.5 TABLET ORAL DAILY
Status: DISCONTINUED | OUTPATIENT
Start: 2018-06-04 | End: 2018-06-06 | Stop reason: HOSPADM

## 2018-06-04 RX ADMIN — Medication 10 ML: at 22:00

## 2018-06-04 RX ADMIN — DOCUSATE SODIUM 100 MG: 100 CAPSULE, LIQUID FILLED ORAL at 16:55

## 2018-06-04 RX ADMIN — INSULIN LISPRO 2 UNITS: 100 INJECTION, SOLUTION INTRAVENOUS; SUBCUTANEOUS at 06:39

## 2018-06-04 RX ADMIN — ANASTROZOLE 1 MG: 1 TABLET, COATED ORAL at 08:37

## 2018-06-04 RX ADMIN — ASPIRIN 81 MG: 81 TABLET, COATED ORAL at 08:30

## 2018-06-04 RX ADMIN — CLOPIDOGREL BISULFATE 75 MG: 75 TABLET ORAL at 08:30

## 2018-06-04 RX ADMIN — Medication 10 ML: at 06:00

## 2018-06-04 RX ADMIN — CARVEDILOL 6.25 MG: 6.25 TABLET, FILM COATED ORAL at 08:30

## 2018-06-04 RX ADMIN — INSULIN LISPRO 3 UNITS: 100 INJECTION, SOLUTION INTRAVENOUS; SUBCUTANEOUS at 11:58

## 2018-06-04 RX ADMIN — FUROSEMIDE 20 MG: 20 TABLET ORAL at 08:30

## 2018-06-04 RX ADMIN — CARVEDILOL 6.25 MG: 6.25 TABLET, FILM COATED ORAL at 16:55

## 2018-06-04 RX ADMIN — Medication 10 ML: at 14:30

## 2018-06-04 RX ADMIN — Medication 1 CAPSULE: at 08:30

## 2018-06-04 RX ADMIN — FOLIC ACID 1 MG: 1 TABLET ORAL at 08:30

## 2018-06-04 RX ADMIN — DOCUSATE SODIUM 100 MG: 100 CAPSULE, LIQUID FILLED ORAL at 08:30

## 2018-06-04 RX ADMIN — LISINOPRIL 2.5 MG: 5 TABLET ORAL at 08:30

## 2018-06-04 RX ADMIN — LEVOTHYROXINE SODIUM 88 MCG: 88 TABLET ORAL at 06:39

## 2018-06-04 RX ADMIN — INSULIN LISPRO 3 UNITS: 100 INJECTION, SOLUTION INTRAVENOUS; SUBCUTANEOUS at 22:06

## 2018-06-04 RX ADMIN — INSULIN LISPRO 5 UNITS: 100 INJECTION, SOLUTION INTRAVENOUS; SUBCUTANEOUS at 16:55

## 2018-06-04 NOTE — PROGRESS NOTES
CM noted weekend CM initial note- spoke with CHRISTOPHE TEJEDA with [address] liason with [employer] - patient has [employer] which requires re-authorization - Linda Ruano, charge nurse made aware of probable need for OT note. Will await [employer] SEXTON approval and if approved will need insurance re-authorization.  JOSE Frost

## 2018-06-04 NOTE — PROGRESS NOTES
0730 Bedside and Verbal shift change report given to ARIANA Madden (oncoming nurse) by Susie Freeman (offgoing nurse). Report included the following information SBAR, Kardex, Procedure Summary, Intake/Output, MAR, Recent Results and Cardiac Rhythm NSR.   1600 Uneventful shift  1930 Bedside and Verbal shift change report given to Susie Freeman (oncoming nurse) by Melody Toussaint (offgoing nurse). Report included the following information SBAR, Kardex, Procedure Summary, Intake/Output, MAR, Recent Results and Cardiac Rhythm NSR.

## 2018-06-04 NOTE — PROGRESS NOTES
Cardiology Progress Note            Admit Date: 6/1/2018  Admit Diagnosis: NSTEMI (non-ST elevated myocardial infarction) [facility])  Date: 6/4/2018     Time: 9:25 AM    HPI: This is an 82-year-old woman who presented from [address] [place] [address] with chief c/o of chest pain and SOB. She has PMH of severe 3 vessel CAD (not surgical candidate and CAD not amenable to PCI), chronic systolic CHF, HTN, HLD (intolerant to statins),  metastatic left breast cancer,  type 2 diabetes, hypothyroidism, RA, CKD, colon cancer. Midsternal sharp Chest pain and SOB started 2 hours before coming to the ER. Code STEMI called d/t EKG findings but upon further review of EKG by Dr. Willam Ewing, determined to have NSTEMI. ACS heparin started, transdermal nitro also started. Also noted to have acute on chronic CHF- noted to have pulmonary edema and unchanged Left pleural effusion on CXR, was placed on bipap, received 80 mg IV lasix. Subjective:  Denies chest pain, SOB, palpitations. States she had episode of gas which started in epigastrum and radiated to left chest- felt pressure yesterday but resolved with nitro. No chest pressure since then. States she walked yesterday short distance with rollator without chest pain or SOB. Assessment and Plan     1. NSTEMI/CAD: severe multivessel disease  -Chest pain resolved now.  -Not candidate for CABG or intervention.  -Continue ASA, plavix, BB, and repatha. No statin due to intolerance in past.  -Restart low dose ace-I      2. Acute on chronic systolic CHF: NYHA class III. -PRO BNP 4165  - 6/1/18 echo 30%. NWMA. (reduced from EF 40% on [facility] 5/21/18)  -Continue Coreg 6.25 mg BID. -Start lisinopril 2.5 mg daily.  -Continue lasix 20 mg po daily  -Holding aldactone for now.   -daily I/Os and weights- try to obtain standing weights. 3. CKD: Stage III. Creatinine improved. 1.06. GFR 50  -Holding aldactone for now.    -Follow renal function closely    4. Hyperkalemia:  Resolved. Off aldactone. K=4.4 today    5. Hx of HTN:  BP controlled  -Continue coreg. Restart low dose lisinopril. 6. Hx of metastatic breast cancer (current) and colon ca. -Per heme/onc    7. Hx of HLD: Continue repatha    8. Hx of hypothyroid:  On synthroid    9. Advanced directives:  DNR  -Palliative care was constuled    Improved over weekend. is not a candidate for CABG. Continue to pursue medical management. Add back low dose lisinopril today. No further cardiac testing needed at this time. Cardiology attending: seen and examined. Agree with assess and plan  Much improved and in good sprits. Bibasilar crackles, systolic murmur, no edema. Med adjustments as above. No further cardiac testing. Objective:      Physical Exam:                Visit Vitals    /54 (BP 1 Location: Right arm, BP Patient Position: At rest)    Pulse 89    Temp 98.1 °F (36.7 °C)    Resp 16    Ht 5' 1\" (1.549 m)    Wt 60.6 kg (133 lb 9.6 oz)    SpO2 98%    BMI 25.24 kg/m2          General Appearance:   Well developed, frail, alert individual in no acute distress. Ears/Nose/Mouth/Throat:    Hearing grossly normal.          Neck:  Supple. Chest:    Bilateral basilar crackles. No use of accessory muscles noted   Cardiovascular:   Regular rate and rhythm, S1, S2 normal, grade I-II/VI systolic murmur heard best at LUSB   Abdomen:    Soft, non-tender, bowel sounds are active. Extremities:  No BLE edema. Skin:  Warm and dry.      Telemetry: NSR          Data Review:    Labs:    Recent Results (from the past 24 hour(s))   GLUCOSE, POC    Collection Time: 06/03/18 11:21 AM   Result Value Ref Range    Glucose (POC) 275 (H) 65 - 100 mg/dL    Performed by Milena Chavez, POC    Collection Time: 06/03/18  6:25 PM   Result Value Ref Range    Glucose (POC) 266 (H) 65 - 100 mg/dL    Performed by Lindsey Turk, POC    Collection Time: 06/03/18 9:54 PM   Result Value Ref Range    Glucose (POC) 232 (H) 65 - 100 mg/dL    Performed by [employer]    METABOLIC PANEL, BASIC    Collection Time: 06/04/18  3:26 AM   Result Value Ref Range    Sodium 134 (L) 136 - 145 mmol/L    Potassium 4.4 3.5 - 5.1 mmol/L    Chloride 102 97 - 108 mmol/L    CO2 23 21 - 32 mmol/L    Anion gap 9 5 - 15 mmol/L    Glucose 150 (H) 65 - 100 mg/dL    BUN 34 (H) 6 - 20 MG/DL    Creatinine 1.06 (H) 0.55 - 1.02 MG/DL    BUN/Creatinine ratio 32 (H) 12 - 20      GFR est AA >60 >60 ml/min/1.73m2    GFR est non-AA 50 (L) >60 ml/min/1.73m2    Calcium 9.4 8.5 - 10.1 MG/DL   GLUCOSE, POC    Collection Time: 06/04/18  6:33 AM   Result Value Ref Range    Glucose (POC) 157 (H) 65 - 100 mg/dL    Performed by [employer]           Radiology:        Current Facility-Administered Medications   Medication Dose Route Frequency    morphine (pf) (DURAMORPH) 1 mg/mL injection 2 mg  2 mg IntraVENous Q1H PRN    nitroglycerin (NITROSTAT) tablet 0.4 mg  0.4 mg SubLINGual PRN    carvedilol (COREG) tablet 6.25 mg  6.25 mg Oral BID WITH MEALS    furosemide (LASIX) tablet 20 mg  20 mg Oral DAILY    acetaminophen (TYLENOL) tablet 650 mg  650 mg Oral Q4H PRN    oxyCODONE-acetaminophen (PERCOCET) 5-325 mg per tablet 1 Tab  1 Tab Oral Q4H PRN    anastrozole (ARIMIDEX) tablet 1 mg  1 mg Oral DAILY    aspirin delayed-release tablet 81 mg  81 mg Oral DAILY    clopidogrel (PLAVIX) tablet 75 mg  75 mg Oral DAILY    docusate sodium (COLACE) capsule 100 mg  100 mg Oral BID    etanercept (ENBREL) injection 50 mg (Patient Supplied)  50 mg SubCUTAneous Q7D    [START ON 6/5/2018] evolocumab (REPATHA) syringe 140 mg (Patient Supplied)  140 mg SubCUTAneous Q 14 DAYS    folic acid (FOLVITE) tablet 1 mg  1 mg Oral DAILY    teriparatide (FORTEO) injection 20 mcg (Patient Supplied)  20 mcg SubCUTAneous DAILY    lactobac ac& pc-s.therm-b.anim (BERYL Q/RISAQUAD)  1 Cap Oral DAILY    levothyroxine (SYNTHROID) tablet 88 mcg  88 mcg Oral ACB    sodium chloride (NS) flush 5-10 mL  5-10 mL IntraVENous Q8H    sodium chloride (NS) flush 5-10 mL  5-10 mL IntraVENous PRN    ondansetron (ZOFRAN) injection 4 mg  4 mg IntraVENous Q4H PRN    insulin lispro (HUMALOG) injection   SubCUTAneous AC&HS    glucose chewable tablet 16 g  4 Tab Oral PRN    dextrose (D50W) injection syrg 12.5-25 g  12.5-25 g IntraVENous PRN    glucagon (GLUCAGEN) injection 1 mg  1 mg IntraMUSCular PRN          Vera Breaker. MARIETTA Montana   Vera Breaker.  MARIETTA Evans MD       [facility], [address] [place] [address] [address]   [facility]   [phone]

## 2018-06-04 NOTE — PROGRESS NOTES
SPEECH THERAPY SCREENING:  SERVICES ARE NOT INDICATED AT THIS TIME    [facility] therapy based on results obtained during the nursing admission assessment. The patients chart was reviewed and the patient is not appropriate for a skilled therapy evaluation at this time. Please consult speech therapy if any therapy needs arise. Thank you.     Cam Turk, SLP

## 2018-06-04 NOTE — PALLIATIVE CARE
Palliative team has met with patient and son multiple times. Goals are clear for now for discharge to rehab; follow-up with Oncology for possible treatment; return to hospital for interventions. Our team will sign off. Please re-consult if needed.

## 2018-06-04 NOTE — PROGRESS NOTES
Brief ELIZABETH note. NN spoke with patient's son Elif Damon regarding [address] for his mother. He informed me that patient is going back to rehabilitation. [facility] information given to patient's son verbally and he may contact them for further inquiry. Patient's son may also discuss further with me or inpatient CM.

## 2018-06-04 NOTE — PROGRESS NOTES
Problem: Self Care Deficits Care Plan (Adult)  Goal: *Acute Goals and Plan of Care (Insert Text)  Occupational Therapy Goals  Initiated 6/4/2018  1. Patient will perform grooming at the sink with minimal assistance/contact guard assist within 7 day(s). 2.  Patient will perform upper body dressing with independence within 7 day(s). 3.  Patient will perform lower body dressing with supervision/set-up within 7 day(s). 4.  Patient will perform toilet transfers with minimal assistance/contact guard assist within 7 day(s). 5.  Patient will perform all aspects of toileting with supervision/set-up within 7 day(s). 6.  Patient will participate in upper extremity therapeutic exercise/activities with supervision/set-up for 5 minutes within 7 day(s). 7.  Patient will utilize energy conservation techniques during functional activities with verbal cues within 7 day(s). Occupational Therapy EVALUATION  Patient: Yusra Arnold (82 y.o. female)  Date: 6/4/2018  Primary Diagnosis: NSTEMI (non-ST elevated myocardial infarction) [facility])        Precautions: Fall    ASSESSMENT :  Based on the objective data described below, the patient presents with overall supervision for upper body ADLs, up to [address] [place] [address] for lower body ADLs, and CGA-Mod A for functional mobility s/p admission for NSTEMI. PTA, patient at inpatient rehab, working on functional mobility & independence with ADLs, still requiring assist for transfer and lower body dressing. Now presents with decreased activity tolerance, global deconditioning, impaired balance & functional mobility, decreased safety awareness, and impaired hemodynamic stability with activity. Completed bed mobility & ambulation to/from bathroom with RW & Min-Mod A, orthostatic drop in BP, patient reporting slight dizziness with changing positions (see below & flowsheets for vitals). Able to doff/don socks with increased time & supervision, however requiring assist for balance in standing.  Verbal cues for safety & rollator management required throughout session. Patient is below her functional baseline (>50% ADL impairment per Barthel Index), would benefit from return to inpatient rehab to maximize safety and functional independence. Vitals:    06/04/18 1013 06/04/18 1015 06/04/18 1022 06/04/18 1030   BP: 133/57 130/88 163/61 140/45   BP 1 Location: Right arm Right arm Right arm Right arm   BP Patient Position: Sitting  Comment: x3 min Standing Sitting;Post activity  Comment: EOB Supine; Post activity   Pulse: 83 97 95 75   Resp:       Temp:       SpO2:       Weight:       Height:             Patient will benefit from skilled intervention to address the above impairments. Patients rehabilitation potential is considered to be Good  Factors which may influence rehabilitation potential include:   []             None noted  []             Mental ability/status  []             Medical condition  []             Home/family situation and support systems  []             Safety awareness  []             Pain tolerance/management  []             Other:      PLAN :  Recommendations and Planned Interventions:  [x]               Self Care Training                  [x]        Therapeutic Activities  [x]               Functional Mobility Training    []        Cognitive Retraining  [x]               Therapeutic Exercises           [x]        Endurance Activities  [x]               Balance Training                   []        Neuromuscular Re-Education  []               Visual/Perceptual Training     [x]   Home Safety Training  [x]               Patient Education                 [x]        Family Training/Education  []               Other (comment):    Frequency/Duration: Patient will be followed by occupational therapy 5 times a week to address goals.   Discharge Recommendations: Inpatient Rehab  Further Equipment Recommendations for Discharge: TBD by rehab     SUBJECTIVE:   Patient stated I just want to get back to the way I was before all of this.     OBJECTIVE DATA SUMMARY:   HISTORY:   Past Medical History:   Diagnosis Date    Anemia 9/2/2009    [facility] ([facility] 9/2/2009    surgery/chemo    [facility]    DM [facility]di[facility] 9/[facility]/2009    GERD (gastroesophageal reflux disease)     H/O: CVA 9/2/2009    slight l sided weakness    Hypothyroid 9/2/2009    Ill-defined condition     seasonal allergies    Microalbuminuria 9/2/2009    Osteoporosis 9/2/2009    Other and unspecified hyperlipidemia 1/27/2010    Rheumatoid arthritis involving ankle ([facility] 9/28/2016    Rheumatoid arthritis(714.0) 9/2/2009    Unspecified essential hypertension 9/2/2009    Weakness due to cerebrovascular accident      Past Surgical History:   Procedure Laterality Date    HX COLECTOMY      HX HYSTERECTOMY      HX OTHER SURGICAL      colonoscopies numerous since 1993       Prior Level of Function/Environment/Context: PTA, patient was at [facility] where she was requiring assist for ADLs (mostly balance and lower body dressing, increased time for activities required). Lives with 3 sons who work during the day, has caregivers throughout the day that were assisting with some ADLs & IADLs, however no assist for mobility. At baseline, ambulated with rollator, L sided weakness from prior CVA. Home Situation  Home Environment: Rehabilitation facility  One/Two Story Residence: One story  Living Alone: No  Support Systems: Inpatient rehab, Family member(s), Home care staff  Patient Expects to be Discharged to[de-identified] Rehabilitation facility  Current DME Used/Available at Home: Haroldo Mohr, rollator, Wheelchair, Grab bars, Shower chair  Tub or Shower Type: Shower    Hand dominance: Right    EXAMINATION OF PERFORMANCE DEFICITS:  Cognitive/Behavioral Status:  Neurologic State: Alert; Appropriate for age  Orientation Level: Oriented X4  Cognition: Follows commands; Appropriate for age attention/concentration  Perception: Appears intact  Perseveration: No perseveration noted  Safety/Judgement: Decreased awareness of need for safety    Skin: Appears intact, dressing applied to R forearm    Edema: None noted in BUEs    Hearing: Auditory  Auditory Impairment: None    Vision/Perceptual:    Tracking: Able to track stimulus in all quadrants w/o difficulty    Acuity: Within Defined Limits         Range of Motion:  In BUEs  AROM: Generally decreased, functional  PROM: Within functional limits     Strength: In BUEs  Strength: Generally decreased, functional (L slightly weaker than R from previous CVA)     Coordination:  Coordination: Generally decreased, functional  Fine Motor Skills-Upper: Left Intact; Right Intact    Gross Motor Skills-Upper: Left Intact; Right Intact    Tone & Sensation:  In BUEs  Tone: Normal  Sensation: Intact     Balance:  Sitting: Intact  Sitting - Static: Good (unsupported)  Sitting - Dynamic: Fair (occasional)  Standing: Impaired; With support (Rollator)  Standing - Static: Good  Standing - Dynamic : Fair    Functional Mobility and Transfers for ADLs:  Bed Mobility:  Rolling: Supervision  Supine to Sit: Minimum assistance (For bringing trunk up to sit)  Sit to Supine: Minimum assistance (For BLEs)  Scooting: Stand-by assistance    Transfers:  Sit to Stand: Minimum assistance  Stand to Sit: Minimum assistance  Toilet Transfer : Moderate assistance (For sit>stand from low toilet)    ADL Assessment:  Feeding: Supervision*    Oral Facial Hygiene/Grooming: Supervision (Seated)*    Bathing: Moderate assistance; Additional time (Seated, assist for standing balance)*    Upper Body Dressing: Supervision*    Lower Body Dressing: Minimum assistance (For standing balance, pants up)    Toileting: Minimum assistance (For standing balance, pants up )   *Inferred per functional mobility, balance, BUE ROM/strength, functional reach, activity tolerance, and vitals             ADL Intervention and task modifications:     Grooming  Grooming Assistance: Contact guard assistance (Simulated; standing at the sink with rollator)    Lower Body Dressing Assistance  Dressing Assistance: Minimum assistance  Underpants: Minimum assistance; Compensatory technique training  Pants With Elastic Waist: Minimum assistance; Compensatory technique training  Socks: Stand-by assistance; Compensatory technique training  Leg Crossed Method Used: Yes  Position Performed: Seated edge of bed  Cues: Verbal cues provided;Physical assistance; Don  Adaptive Equipment Used:  (Rollator for balance)    Toileting  Toileting Assistance: Minimum assistance (Simulated)  Bladder Hygiene: Minimum assistance  Bowel Hygiene: Minimum assistance  Clothing Management: Minimum assistance  Cues: Verbal cues provided; Tactile cues provided  Adaptive Equipment: Grab bars    Cognitive Retraining  Safety/Judgement: Decreased awareness of need for safety    Therapeutic Exercise:  - Patient completing bed mobility with Min A for trunk/LE management, functional transfers with Min A-Mod A (increased A required for low toilet transfer) & rollator, safety cues required throughout activity to lock brakes (see vitals--varying throughout activity)  - Ambulation to/from bathroom with CGA & rollator, cues for safety and rollator management (see vitals--varying throughout activity)    Functional Measure:  Barthel Index:    Bathin  Bladder: 10  Bowels: 10  Groomin  Dressin  Feeding: 10  Mobility: 0  Stairs: 0  Toilet Use: 5  Transfer (Bed to Chair and Back): 10  Total: 55       Barthel and G-code impairment scale:  Percentage of impairment CH  0% CI  1-19% CJ  20-39% CK  40-59% CL  60-79% CM  80-99% CN  100%   Barthel Score 0-100 100 99-80 79-60 59-40 20-39 1-19   0   Barthel Score 0-20 20 17-19 13-16 9-12 5-8 1-4 0      The Barthel ADL Index: Guidelines  1. The index should be used as a record of what a patient does, not as a record of what a patient could do.   2. The main aim is to establish degree of independence from any help, physical or verbal, however minor and for whatever reason. 3. The need for supervision renders the patient not independent. 4. A patient's performance should be established using the best available evidence. Asking the patient, friends/relatives and nurses are the usual sources, but direct observation and common sense are also important. However direct testing is not needed. 5. Usually the patient's performance over the preceding 24-48 hours is important, but occasionally longer periods will be relevant. 6. Middle categories imply that the patient supplies over 50 per cent of the effort. 7. Use of aids to be independent is allowed. Charisse Lowe., Barthel, D.W. (0985). Functional evaluation: the Barthel Index. [address] [facility] (14)2. Rafiq Torres sandra TIAGO Mcgovern, Romi Amaya., Vince Rollins., [facility], [facility] (1999). Measuring the change indisability after inpatient rehabilitation; comparison of the responsiveness of the Barthel Index and Functional Guild Measure. Journal of Neurology, Neurosurgery, and Psychiatry, 66(4), 057-311. Daphnie Choudhary, N.J.A, ANDREA Ramos, & Kat Duran, M.A. (2004.) Assessment of post-stroke quality of life in cost-effectiveness studies: The usefulness of the Barthel Index and the EuroQoL-5D. Quality of Life Research, 13, 810-34           G codes: In compliance with CMSs Claims Based Outcome Reporting, the following G-code set was chosen for this patient based on their primary functional limitation being treated: The outcome measure chosen to determine the severity of the functional limitation was the Barthel Index with a score of 55/100 which was correlated with the impairment scale. ?  Self Care:     - CURRENT STATUS: CK - 40%-59% impaired, limited or restricted    - GOAL STATUS: CJ - 20%-39% impaired, limited or restricted    - D/C STATUS:  ---------------To be determined---------------     Occupational Therapy Evaluation Charge Determination   History Examination Decision-Making   LOW Complexity : Brief history review  LOW Complexity : 1-3 performance deficits relating to physical, cognitive , or psychosocial skils that result in activity limitations and / or participation restrictions  LOW Complexity : No comorbidities that affect functional and no verbal or physical assistance needed to complete eval tasks       Based on the above components, the patient evaluation is determined to be of the following complexity level: LOW   Pain:  Pain Scale 1: Numeric (0 - 10)  Pain Intensity 1: 0              Activity Tolerance:   Good, see flowsheets for vitals (varying BPs throughout activity)    Please refer to the flowsheet for vital signs taken during this treatment. After treatment:   [] Patient left in no apparent distress sitting up in chair  [x] Patient left in no apparent distress in bed  [x] Call bell left within reach  [] Nursing notified  [x] Caregiver present  [] Bed alarm activated    COMMUNICATION/EDUCATION:   The patients plan of care was discussed with: Physical Therapist and Registered Nurse. [x] Home safety education was provided and the patient/caregiver indicated understanding. [x] Patient/family have participated as able in goal setting and plan of care. [x] Patient/family agree to work toward stated goals and plan of care. [] Patient understands intent and goals of therapy, but is neutral about his/her participation. [] Patient is unable to participate in goal setting and plan of care. This patients plan of care is appropriate for delegation to [facility].     Thank you for this referral.  Kylee Rosenberg OT  Time Calculation: 34 mins

## 2018-06-04 NOTE — PROGRESS NOTES
Problem: Falls - Risk of  Goal: *Absence of Falls  Document Yaron Fall Risk and appropriate interventions in the flowsheet. Outcome: Progressing Towards Goal  Fall Risk Interventions:  Mobility Interventions: Communicate number of staff needed for ambulation/transfer, Patient to call before getting OOB    Mentation Interventions: Adequate sleep, hydration, pain control, Evaluate medications/consider consulting pharmacy    Medication Interventions: Evaluate medications/consider consulting pharmacy, Patient to call before getting OOB    Elimination Interventions: Call light in reach, Patient to call for help with toileting needs    History of Falls Interventions: Bed/chair exit alarm, Evaluate medications/consider consulting pharmacy        Problem: Pressure Injury - Risk of  Goal: *Prevention of pressure injury  Document Calderon Scale and appropriate interventions in the flowsheet.      Duoderm  Turn approximately every 2 hours         Outcome: Progressing Towards Goal  Pressure Injury Interventions:  Sensory Interventions: Assess changes in LOC    Moisture Interventions: Check for incontinence Q2 hours and as needed, Limit adult briefs    Activity Interventions: Assess need for specialty bed, Pressure redistribution bed/mattress(bed type)    Mobility Interventions: Assess need for specialty bed, HOB 30 degrees or less    Nutrition Interventions: Document food/fluid/supplement intake    Friction and Shear Interventions: Apply protective barrier, creams and emollients, Lift sheet               Problem: Unstable Angina/NSTEMI: Discharge Outcomes  Goal: *Hemodynamically stable  Outcome: Progressing Towards Goal  Patient Vitals for the past 12 hrs:   Temp Pulse Resp BP SpO2   06/04/18 0732 98.1 °F (36.7 °C) 89 16 136/54 98 %   06/04/18 0313 97.9 °F (36.6 °C) 88 16 119/41 100 %   06/03/18 2231 98.8 °F (37.1 °C) 98 18 107/59 97 %       Goal: *Stable cardiac rhythm  Outcome: Progressing Towards Goal  NSR      Comments: Patient Vitals for the past 12 hrs:   Temp Pulse Resp BP SpO2   06/04/18 0732 98.1 °F (36.7 °C) 89 16 136/54 98 %   06/04/18 0313 97.9 °F (36.6 °C) 88 16 119/41 100 %   06/03/18 2231 98.8 °F (37.1 °C) 98 18 107/59 97 %     Last 3 Recorded Weights in this Encounter    06/02/18 0600 06/03/18 0300 06/04/18 0313   Weight: 55.9 kg (123 lb 4.8 oz) 56.2 kg (124 lb) 60.6 kg (133 lb 9.6 oz)

## 2018-06-04 NOTE — PROGRESS NOTES
1930: Bedside shift change report given to [address] [place] [address] (oncoming nurse) by Yvette Oliveros (offgoing nurse). Report included the following information SBAR, Kardex, ED Summary, Intake/Output, MAR, Accordion, Recent Results and Med Rec Status. 0730: Bedside shift change report given to [address] [place] [address] (oncoming nurse) by [address] [place] [address] (offgoing nurse). Report included the following information SBAR, Kardex, ED Summary, Intake/Output, MAR, Accordion, Recent Results and Med Rec Status.

## 2018-06-04 NOTE — PROGRESS NOTES
Hospitalist Progress Note  Manas Thomas MD  Answering service: [phone] from in house phone  Cell: [phone]      Date of Service:  2018  NAME:  Tamie Moreland  :  1935  MRN:  [id number]      Admission Summary: This is an 62-year-old woman with a past medical history significant for coronary artery disease, chronic systolic congestive heart failure, metastatic left breast cancer, hypertension, type 2 diabetes, hypothyroidism, rheumatoid arthritis, dyslipidemia, chronic kidney disease, colon cancer, was in her usual state of health until the day of her presentation at the emergency room when the patient developed shortness of breath and chest pain at the rehab facility where the patient is presently receiving rehabilitation. The symptoms started 2 hours before coming to the emergency room. This shortness of breath is with little or no activities. The chest pain is located at the center of the chest with talc radiation. No known aggravating or relieving factors. The chest pain is 10/10 in severity, sharp and constant. At the onset of the patient's symptoms, there was concern for ST elevation myocardial infarction. The emergency room department was alerted. When the patient arrived at the emergency room, code STEMI was called. The protocol for code STEMI was followed. The cardiology came to the emergency room. The cardiology is of the opinion that the patient has a non-ST elevation myocardial infarction and advised admission to the hospitalist service. Patient was also found to be in acute on chronic systolic congestive heart failure.       Interval history / Subjective:      Pt seen and examined  No overnight issues  Doing well  No further chest pain sob    Assessment & Plan:     NSTEMI, severe multivessel CAD with recent ACS/ NSTEMI  -on heparin gtt, aspirin, plavix, coreg and prn nitroglycerine, oxygen support  -not a surgical candidate  -troponin 1.44  -echo LV EF 30% no obvious wall motion abnormalitis  -cardiologist on board    Acute on chronic systolic CHF NYHA Class IV  -repeated echo on 6/1 LV EF 30%  -Chest x ray New moderate interstitial pulmonary edema. Unchanged small left pleural effusion  -probnp 4165  -continue lasix po  - c/w Coreg  -hold lisinopril and spironolactone for borderline BP  -monitor I/O and daily weight  -cardiologist on board    Acute on chronic hypercapnic hypoxic respiratory failure due to CHF   -pH7. 33 pCO2 51 pO2 276 on FiO2 100%  -improved, off BiPAP now, on lasix, oxygen support, monitor pulse ox    Metastatic left breast cancer   -stable, continue arimidex    HTN  -BP normal, continue coreg and lasix for now    DM-II with hyperglycemia   -On sliding scale, monitor finger stick glucose    Hypothyroidism  -continue sythroid    CKD stage III  -creatinine stable  -avoid nephrotoxine  -monitor renal function    Mild hyponatremia   - monitor     Mild hyperkalemia   - resolved  -hold spironolactone and lisinopril    Hyperphosphatemia   -repeat phosphorus level    Anemia of chronic disease   -H/H stable    Hx of RA  -stable  -Received outpatient etanercept    Lower sacrum & gluteal cleft chronic wound POA  -wound care on board    DNR, at risk for decompensation      Code status: DNR   DVT prophylaxis: on heparin    Care Plan discussed with: Patient/Family and Nurse  Disposition: From [facility] giver at bedside, questions answered   I called her son, CORINNE Kelly [phone] and updated her clinical condition and answered his questions.   Update son every day     Hospital Problems  Date Reviewed: 6/1/2018          Codes Class Noted POA    * (Principal)NSTEMI (non-ST elevated myocardial infarction) ([facility] [address] ICD-10-CM: I21.4  ICD-9-CM: 410.70  6/1/2018 Yes        Metastatic breast cancer ([email or web address] ICD-10-CM: C50.919  ICD-9-CM: 174.9  6/1/2018 Unknown        Goals of care, counseling/discussion ICD-10-CM: Z71.89  ICD-9-CM: V65.49  6/1/2018 Unknown                Vital Signs:    Last 24hrs VS reviewed since prior progress note. Most recent are:  Visit Vitals    /48 (BP 1 Location: Right arm, BP Patient Position: At rest)    Pulse 88    Temp 98.9 °F (37.2 °C)    Resp 18    Ht 5' 1\" (1.549 m)    Wt 60.6 kg (133 lb 9.6 oz)    SpO2 96%    BMI 25.24 kg/m2         Intake/Output Summary (Last 24 hours) at 06/04/18 1547  Last data filed at 06/04/18 1432   Gross per 24 hour   Intake              560 ml   Output                0 ml   Net              560 ml        Physical Examination:             Constitutional:  No acute distress, cooperative, pleasant    ENT:  Oral mucous moist, oropharynx benign. Neck supple,    Resp:  improved BS    CV:  Regular rhythm, normal rate, no murmurs, gallops, rubs    GI:  Soft, non distended, non tender. normoactive bowel sounds,    Musculoskeletal:  No edema    Neurologic:  Moves all extremities. AAOx3,     Skin:  Good turgor, no rashes or ulcers       Data Review:    Review and/or order of clinical lab test      Labs:     Recent Labs      06/03/18   0310  06/02/18   0325   WBC  12.1*  11.0   HGB  9.3*  9.1*   HCT  30.1*  29.6*   PLT  286  307     Recent Labs      06/04/18   0326  06/03/18   0310  06/02/18   0325   NA  134*  131*  133*   K  4.4  4.9  4.9   CL  102  100  102   CO2  23  21  22   BUN  34*  38*  40*   CREA  1.06*  1.16*  1.25*   GLU  150*  281*  166*   CA  9.4  9.6  9.0     Recent Labs      06/02/18   0325   SGOT  39*   ALT  26   AP  589*   TBILI  0.3   TP  7.2   ALB  2.3*   GLOB  4.9*     Recent Labs      06/03/18   0310  06/02/18   1841  06/02/18   1316   APTT  65.9*  52.0*  68.8*      No results for input(s): FE, TIBC, PSAT, FERR in the last 72 hours. No results found for: FOL, RBCF   No results for input(s): PH, PCO2, PO2 in the last 72 hours. No results for input(s): CPK, CKNDX, TROIQ in the last 72 hours.     No lab exists for component: CPKMB  Lab Results   Component Value Date/Time    Cholesterol, total 74 04/16/2018 04:21 AM    HDL Cholesterol 25 04/16/2018 04:21 AM    LDL, calculated 31.6 04/16/2018 04:21 AM    Triglyceride 87 04/16/2018 04:21 AM    CHOL/HDL Ratio 3.0 04/16/2018 04:21 AM     Lab Results   Component Value Date/Time    Glucose (POC) 236 (H) 06/04/2018 11:04 AM    Glucose (POC) 157 (H) 06/04/2018 06:33 AM    Glucose (POC) 232 (H) 06/03/2018 09:54 PM    Glucose (POC) 266 (H) 06/03/2018 06:25 PM    Glucose (POC) 275 (H) 06/03/2018 11:21 AM     Lab Results   Component Value Date/Time    Color YELLOW/STRAW 05/19/2018 02:58 AM    Appearance CLOUDY (A) 05/19/2018 02:58 AM    Specific gravity 1.015 05/19/2018 02:58 AM    pH (UA) 6.0 05/19/2018 02:58 AM    Protein 30 (A) 05/19/2018 02:58 AM    Glucose 500 (A) 05/19/2018 02:58 AM    Ketone NEGATIVE  05/19/2018 02:58 AM    Bilirubin NEGATIVE  05/19/2018 02:58 AM    Urobilinogen 0.2 05/19/2018 02:58 AM    Nitrites NEGATIVE  05/19/2018 02:58 AM    Leukocyte Esterase MODERATE (A) 05/19/2018 02:58 AM    Epithelial cells FEW 05/19/2018 02:58 AM    Bacteria NEGATIVE  05/19/2018 02:58 AM    WBC 20-50 05/19/2018 02:58 AM    RBC 10-20 05/19/2018 02:58 AM         Medications Reviewed:     Current Facility-Administered Medications   Medication Dose Route Frequency    lisinopril (PRINIVIL, ZESTRIL) tablet 2.5 mg  2.5 mg Oral DAILY    morphine (pf) (DURAMORPH) 1 mg/mL injection 2 mg  2 mg IntraVENous Q1H PRN    nitroglycerin (NITROSTAT) tablet 0.4 mg  0.4 mg SubLINGual PRN    carvedilol (COREG) tablet 6.25 mg  6.25 mg Oral BID WITH MEALS    furosemide (LASIX) tablet 20 mg  20 mg Oral DAILY    acetaminophen (TYLENOL) tablet 650 mg  650 mg Oral Q4H PRN    oxyCODONE-acetaminophen (PERCOCET) 5-325 mg per tablet 1 Tab  1 Tab Oral Q4H PRN    anastrozole (ARIMIDEX) tablet 1 mg  1 mg Oral DAILY    aspirin delayed-release tablet 81 mg  81 mg Oral DAILY    clopidogrel (PLAVIX) tablet 75 mg  75 mg Oral DAILY    docusate sodium (COLACE) capsule 100 mg  100 mg Oral BID    etanercept (ENBREL) injection 50 mg (Patient Supplied)  50 mg SubCUTAneous Q7D    [START ON 6/5/2018] evolocumab (REPATHA) syringe 140 mg (Patient Supplied)  140 mg SubCUTAneous Q 14 DAYS    folic acid (FOLVITE) tablet 1 mg  1 mg Oral DAILY    teriparatide (FORTEO) injection 20 mcg (Patient Supplied)  20 mcg SubCUTAneous DAILY    lactobac ac& pc-s.therm-b.anim (BERYL Q/RISAQUAD)  1 Cap Oral DAILY    levothyroxine (SYNTHROID) tablet 88 mcg  88 mcg Oral ACB    sodium chloride (NS) flush 5-10 mL  5-10 mL IntraVENous Q8H    sodium chloride (NS) flush 5-10 mL  5-10 mL IntraVENous PRN    ondansetron (ZOFRAN) injection 4 mg  4 mg IntraVENous Q4H PRN    insulin lispro (HUMALOG) injection   SubCUTAneous AC&HS    glucose chewable tablet 16 g  4 Tab Oral PRN    dextrose (D50W) injection syrg 12.5-25 g  12.5-25 g IntraVENous PRN    glucagon (GLUCAGEN) injection 1 mg  1 mg IntraMUSCular PRN     ______________________________________________________________________  EXPECTED LENGTH OF STAY: 4d 7h  ACTUAL LENGTH OF STAY:          3                 Stephany Lopez MD

## 2018-06-05 LAB
CEA SERPL-MCNC: 12.2 NG/ML
GLUCOSE BLD STRIP.AUTO-MCNC: 157 MG/DL (ref 65–100)
GLUCOSE BLD STRIP.AUTO-MCNC: 233 MG/DL (ref 65–100)
GLUCOSE BLD STRIP.AUTO-MCNC: 241 MG/DL (ref 65–100)
GLUCOSE BLD STRIP.AUTO-MCNC: 286 MG/DL (ref 65–100)
SERVICE CMNT-IMP: ABNORMAL

## 2018-06-05 PROCEDURE — 82378 CARCINOEMBRYONIC ANTIGEN: CPT | Performed by: INTERNAL MEDICINE

## 2018-06-05 PROCEDURE — 74011250637 HC RX REV CODE- 250/637: Performed by: INTERNAL MEDICINE

## 2018-06-05 PROCEDURE — 65660000000 HC RM CCU STEPDOWN

## 2018-06-05 PROCEDURE — 74011250637 HC RX REV CODE- 250/637: Performed by: NURSE PRACTITIONER

## 2018-06-05 PROCEDURE — 86300 IMMUNOASSAY TUMOR CA 15-3: CPT | Performed by: INTERNAL MEDICINE

## 2018-06-05 PROCEDURE — 82962 GLUCOSE BLOOD TEST: CPT

## 2018-06-05 PROCEDURE — 74011636637 HC RX REV CODE- 636/637: Performed by: INTERNAL MEDICINE

## 2018-06-05 PROCEDURE — 97535 SELF CARE MNGMENT TRAINING: CPT

## 2018-06-05 PROCEDURE — 36415 COLL VENOUS BLD VENIPUNCTURE: CPT | Performed by: INTERNAL MEDICINE

## 2018-06-05 RX ADMIN — INSULIN LISPRO 3 UNITS: 100 INJECTION, SOLUTION INTRAVENOUS; SUBCUTANEOUS at 16:55

## 2018-06-05 RX ADMIN — ASPIRIN 81 MG: 81 TABLET, COATED ORAL at 08:46

## 2018-06-05 RX ADMIN — Medication 10 ML: at 21:56

## 2018-06-05 RX ADMIN — CARVEDILOL 6.25 MG: 6.25 TABLET, FILM COATED ORAL at 16:55

## 2018-06-05 RX ADMIN — Medication 10 ML: at 06:00

## 2018-06-05 RX ADMIN — INSULIN LISPRO 2 UNITS: 100 INJECTION, SOLUTION INTRAVENOUS; SUBCUTANEOUS at 22:04

## 2018-06-05 RX ADMIN — INSULIN LISPRO 2 UNITS: 100 INJECTION, SOLUTION INTRAVENOUS; SUBCUTANEOUS at 07:16

## 2018-06-05 RX ADMIN — LISINOPRIL 2.5 MG: 5 TABLET ORAL at 08:46

## 2018-06-05 RX ADMIN — FOLIC ACID 1 MG: 1 TABLET ORAL at 08:47

## 2018-06-05 RX ADMIN — FUROSEMIDE 20 MG: 20 TABLET ORAL at 08:46

## 2018-06-05 RX ADMIN — CLOPIDOGREL BISULFATE 75 MG: 75 TABLET ORAL at 08:47

## 2018-06-05 RX ADMIN — Medication 1 CAPSULE: at 08:46

## 2018-06-05 RX ADMIN — LEVOTHYROXINE SODIUM 88 MCG: 88 TABLET ORAL at 07:05

## 2018-06-05 RX ADMIN — DOCUSATE SODIUM 100 MG: 100 CAPSULE, LIQUID FILLED ORAL at 08:47

## 2018-06-05 RX ADMIN — DOCUSATE SODIUM 100 MG: 100 CAPSULE, LIQUID FILLED ORAL at 16:54

## 2018-06-05 RX ADMIN — CARVEDILOL 6.25 MG: 6.25 TABLET, FILM COATED ORAL at 08:46

## 2018-06-05 RX ADMIN — Medication 10 ML: at 15:54

## 2018-06-05 RX ADMIN — INSULIN LISPRO 5 UNITS: 100 INJECTION, SOLUTION INTRAVENOUS; SUBCUTANEOUS at 11:50

## 2018-06-05 RX ADMIN — ANASTROZOLE 1 MG: 1 TABLET, COATED ORAL at 11:09

## 2018-06-05 NOTE — PROGRESS NOTES
0774 Bedside and Verbal shift change report given to Abbe Smith (oncoming nurse) by Polo Sauceda RN (offgoing nurse). Report included the following information SBAR, Kardex, Intake/Output, MAR, Recent Results and Med Rec Status. 3169 Bedside and Verbal shift change report given to Rossy Andino RN (oncoming nurse) by Camila Calles RN (offgoing nurse). Report included the following information SBAR, Kardex, Procedure Summary, Intake/Output, MAR, Recent Results and Med Rec Status.

## 2018-06-05 NOTE — PROGRESS NOTES
Problem: Falls - Risk of  Goal: *Absence of Falls  Document Yaron Fall Risk and appropriate interventions in the flowsheet. Outcome: Progressing Towards Goal  Fall Risk Interventions:  Mobility Interventions: Communicate number of staff needed for ambulation/transfer, OT consult for ADLs, Patient to call before getting OOB, PT Consult for mobility concerns, Strengthening exercises (ROM-active/passive), Utilize walker, cane, or other assitive device    Mentation Interventions: Adequate sleep, hydration, pain control, Door open when patient unattended, Evaluate medications/consider consulting pharmacy, Eyeglasses and hearing aids, Familiar objects from home, Increase mobility, More frequent rounding, Reorient patient, Update white board    Medication Interventions: Evaluate medications/consider consulting pharmacy, Patient to call before getting OOB, Teach patient to arise slowly    Elimination Interventions: Call light in reach, Patient to call for help with toileting needs, Toileting schedule/hourly rounds    History of Falls Interventions: Consult care management for discharge planning, Evaluate medications/consider consulting pharmacy        Problem: Pressure Injury - Risk of  Goal: *Prevention of pressure injury  Document Calderon Scale and appropriate interventions in the flowsheet.      Duoderm  Turn approximately every 2 hours         Outcome: Progressing Towards Goal  Pressure Injury Interventions:  Sensory Interventions: Assess changes in LOC, Assess need for specialty bed, Chair cushion, Keep linens dry and wrinkle-free, Minimize linen layers    Moisture Interventions: Absorbent underpads, Limit adult briefs, Maintain skin hydration (lotion/cream), Minimize layers    Activity Interventions: Increase time out of bed, Pressure redistribution bed/mattress(bed type), PT/OT evaluation, Chair cushion    Mobility Interventions: Chair cushion, Float heels, Pressure redistribution bed/mattress (bed type), PT/OT evaluation    Nutrition Interventions: Discuss nutritional consult with provider, Document food/fluid/supplement intake, Offer support with meals,snacks and hydration    Friction and Shear Interventions: Feet elevated on foot rest, Foam dressings/transparent film/skin sealants, Lift sheet, Minimize layers               1

## 2018-06-05 NOTE — CONSULTS
[address]    Rigoberto Dutta  MR#: [id number]  : 1935  ACCOUNT #: [de-identified]   DATE OF SERVICE: 2018    HISTORY OF PRESENT ILLNESS:  The patient is an 63-year-old woman with a complicated medical history which includes metastatic breast cancer diagnosed in April, who is seen for medical oncology evaluation. This patient was admitted to [facility] on  with worsening shortness of breath and was diagnosed with a non-ST elevation myocardial infarction with associated congestive heart failure. During this hospitalization, she has had a slow improvement in her chest discomfort and shortness of breath. We were asked to see her with regards to the breast cancer diagnosis made in April. Since her discharge from the hospital on , she has been readmitted 1 other time on  for shortness of breath, that was a 6-day hospital stay and she went back to rehabilitation. During this timeframe, she has been taking Arimidex, which was started during the April admission and she has tolerated it well. PAST MEDICAL HISTORY:  Includes coronary artery disease, myocardial infarction, congestive heart failure, hypertension, diabetes, hypothyroidism, rheumatoid arthritis, hypercholesterolemia, chronic kidney disease, metastatic ER positive HER-2 negative left-sided breast cancer diagnosed 2018 with bone metastases. She also has a history of early stage colon cancer. PAST SURGICAL HISTORY:  Includes hemicolectomy, hysterectomy, colonoscopies, left breast biopsy. ALLERGIES:  SULFA AND STATINS. FAMILY HISTORY:  Her father had stomach cancer. SOCIAL HISTORY:  She never smoked. She does not use alcohol. She is originally from [facility]. REVIEW OF SYSTEMS:  As noted above, otherwise noncontributory. PHYSICAL EXAMINATION:  GENERAL:  She is a pleasant, frail appearing elderly woman in no apparent distress.   VITAL SIGNS:  Temperature 98, pulse 80, /40, respirations 18, O2 sat 97% on room air. HEENT:  EOMI. Nonicteric sclerae. She has nasal cannula oxygen in place. LUNGS:  Reveal diminished breath sounds in the left lung base, otherwise clear. HEART:  Regular rate and rhythm, I/VI systolic murmur. BREASTS:  She has a 7 x 7 cm area of induration in the left upper outer breast without dimpling or overlying skin erythema. She also has palpable lymphadenopathy in the left axilla. ABDOMEN:  Soft, nontender, no hepatosplenomegaly noted. EXTREMITIES:  No clubbing, cyanosis or edema. NEUROLOGIC:  AO x3, nonfocal.    LABORATORY DATA:  Hemoglobin is 9.3, white count 12, platelet count 094. BUN and creatinine are 34 and 1.0. Liver enzymes are normal.  Alkaline phosphatase is 589, albumin is 2.3. IMPRESSION:  Metastatic, ER/WV positive, HER-2 negative breast cancer diagnosed in 04/2018. The patient has been on Arimidex for approximately 5 weeks. It is a bit on the early side to determine if she is responding to treatment, we generally will get hormonal therapy for 2 months before making that assessment. However, she did have baseline tumor markers which were abnormal and can be tested easily and may shed some further light on this question. I will order those to be drawn today. She is currently receiving Arimidex as a single agent hormonal therapy. We typically like to add Ibrance to the AI therapies in the first line given recent data showing improved survival in patients treated such. However, Miriam Diehl is not on the hospital formulary. We will follow closely with you on behalf of [employer].       MD THOM Freire / MARIELA  D: 06/05/2018 14:37     T: 06/05/2018 15:01  JOB #: [id number]

## 2018-06-05 NOTE — DISCHARGE INSTRUCTIONS
Discharge SNF/Rehab Instructions/LTAC       PATIENT ID: Kelby Werner  MRN: [id number]   YOB: 1935    DATE OF ADMISSION: 6/1/2018  3:29 AM    DATE OF DISCHARGE: 6/6/2018    PRIMARY CARE PROVIDER: Emilia Santos MD       ATTENDING PHYSICIAN: Elise De La Torre MD  DISCHARGING PROVIDER: Elise De La Torre MD     To contact this individual call [phone] and ask the  to page. If unavailable ask to be transferred the Adult Hospitalist Department. CONSULTATIONS: None    PROCEDURES/SURGERIES: * No surgery found *    ADMITTING DIAGNOSES & HOSPITAL COURSE:   This is an 71-year-old woman with a past medical history significant for coronary artery disease, chronic systolic congestive heart failure, metastatic left breast cancer, hypertension, type 2 diabetes, hypothyroidism, rheumatoid arthritis, dyslipidemia, chronic kidney disease, colon cancer, was in her usual state of health until the day of her presentation at the emergency room when the patient developed shortness of breath and chest pain at the rehab facility where the patient is presently receiving rehabilitation.  The symptoms started 2 hours before coming to the emergency room.  This shortness of breath is with little or no activities.  The chest pain is located at the center of the chest with talc radiation.  No known aggravating or relieving factors.  The chest pain is 10/10 in severity, sharp and constant.  At the onset of the patient's symptoms, there was concern for ST elevation myocardial infarction.  The emergency room department was alerted. Ania Holley the patient arrived at the emergency room, code STEMI was called.  The protocol for code STEMI was followed. [facility] cardiology came to the emergency room. [facility] cardiology is of the opinion that the patient has a non-ST elevation myocardial infarction and advised admission to the hospitalist service. Pelon Ribeiro was also found to be in acute on chronic systolic congestive heart failure.      NSTEMI, severe multivessel CAD with recent ACS/ NSTEMI  -continue aspirin, plavix, coreg and prn nitroglycerine,  -not on statin due to previous intolerance  -off oxygen, SaO2 95-96% on RA  -not a surgical candidate  -troponin 1.44  -echo LV EF 30% no obvious wall motion abnormalitis  -cardiologist on board   Acute on chronic systolic CHF NYHA Class IV  -repeated echo on 6/1 LV EF 30%  -Chest x ray 6/2 improving pulmonary edema, unchanged left pleural effusion  -probnp 4165  -continue lasix, coreg and lisinopril  -spironolactone is held for hyperkalemia  -monitor I/O and daily weight  -cardiologist on board    Acute on chronic hypercapnic hypoxic respiratory failure due to CHF   -improved, off BiPAP now, on lasix,  -off oxygen, on RA SaO2 95-96%, monitor pulse ox  Metastatic left breast cancer   -stable, continue arimidex    HTN  -BP normal, continue coreg, lisinopril and lasix for now   DM-II with hyperglycemia   -On sliding scale, monitor finger stick glucose    Hypothyroidism  -continue sythroid  CKD stage III  -creatinine normal  -avoid nephrotoxine  -monitor renal function  Mild hyponatremia   -improving   Mild hyperkalemia   - resolved  -held spironolactone   Hyperphosphatemia   -repeat phosphorus level   Anemia of chronic disease   -H/H stable  Hx of RA  -stable  -on etanercept    Lower sacrum & gluteal cleft chronic wound POA  -improved, wound care on board         Code status: DNR  DVT prophylaxis: SCD        DISCHARGE DIAGNOSES / PLAN:      NSTEMI, severe multivessel CAD with recent ACS/ NSTEMI  -continue aspirin, plavix, coreg and prn nitroglycerine,  -not on statin due to previous intolerance  -follow up with cardiologist as an outpatient  Acute on chronic systolic CHF NYHA Class IV  -continue lasix, coreg and lisinopril  -spironolactone is held for hyperkalemia  -monitor I/O and daily weight  -follow up with cardiologist as an outpatient  Acute on chronic hypercapnic hypoxic respiratory failure due to CHF   -resolved, on RA SaO2 95-96%, monitor pulse ox  Metastatic left breast cancer   -stable, continue arimidex    HTN  -continue coreg and lisinopril and monitor BP  DM-II with hyperglycemia   -On sliding scale, monitor finger stick glucose    Hypothyroidism  -continue sythroid  CKD stage III  -creatinine normal  Mild hyponatremia   -improving   Mild hyperkalemia   - resolved  -held spironolactone   Hyperphosphatemia   -repeat phosphorus level   Anemia of chronic disease   -H/H stable  Hx of RA  -stable  -on etanercept    Lower sacrum & gluteal cleft chronic wound POA  -improved, wound care on board      PENDING TEST RESULTS:   At the time of discharge the following test results are still pending: none    FOLLOW UP APPOINTMENTS:    Follow-up Information     Follow up With Details Comments Contact Info   1. Freddy Salguero MD In one week after discharge  [address] [place] [address]  [phone]       2. Follow up with Foster Blum MD, Cardiologist in one week  3. Tay Fulton MD, Hematology/Oncology on 6/15/2015    ADDITIONAL CARE RECOMMENDATIONS:     DIET: Diabetic Diet    TUBE FEEDING INSTRUCTIONS: none    OXYGEN / BiPAP SETTINGS: none    ACTIVITY: Activity as tolerated    WOUND CARE: Please maintain Duoderm up to one week or change as needed with soiling or rolled edges. Please use adhesive remover wipes when changing. EQUIPMENT needed: none      DISCHARGE MEDICATIONS:   See Medication Reconciliation Form      NOTIFY YOUR PHYSICIAN FOR ANY OF THE FOLLOWING:   Fever over 101 degrees for 24 hours. Chest pain, shortness of breath, fever, chills, nausea, vomiting, diarrhea, change in mentation, falling, weakness, bleeding. Severe pain or pain not relieved by medications. Or, any other signs or symptoms that you may have questions about.     DISPOSITION:    Home With:   OT  PT  HH  RN      x SNF/Inpatient Rehab/LTAC    Independent/assisted living    Hospice    Other:       PATIENT CONDITION AT DISCHARGE:     Functional status    Poor    x Deconditioned     Independent      Cognition   x  Lucid     Forgetful     Dementia      Catheters/lines (plus indication)    Alford     PICC     PEG    x None      Code status     Full code    x DNR      PHYSICAL EXAMINATION AT DISCHARGE:   Refer to Progress Note      CHRONIC MEDICAL DIAGNOSES:  Problem List as of 6/5/2018  Date Reviewed: 6/1/2018          Codes Class Noted - Resolved    * (Principal)NSTEMI (non-ST elevated myocardial infarction) ([facility] ICD-10-CM: I21.4  ICD-9-CM: 410.70  6/1/2018 - Present        Metastatic breast cancer ([facility] ICD-10-CM: C50.919  ICD-9-CM: 174.9  6/1/2018 - Present        Goals of care, counseling/discussion ICD-10-CM: Z71.89  ICD-9-CM: V65.49  6/1/2018 - Present        Acute on chronic systolic HF (heart failure) ([facility]) ICD-10-CM: I50.23  ICD-9-CM: 428.23  5/19/2018 - Present        Acute systolic heart failure ([facility] ICD-10-CM: I50.21  ICD-9-CM: 428.21  4/24/2018 - Present        Altered mental status, unspecified ICD-10-CM: R41.82  ICD-9-CM: 780.97  4/23/2018 - Present        SOB (shortness of breath) ICD-10-CM: R06.02  ICD-9-CM: 786.05  4/16/2018 - Present        CKD (chronic kidney disease) stage 3, GFR 30-59 ml/min ICD-10-CM: N18.3  ICD-9-CM: 585.3  10/25/2017 - Present        Vitamin D deficiency ICD-10-CM: E55.9  ICD-9-CM: 268.9  6/16/2017 - Present        Asymptomatic hyperuricemia ICD-10-CM: E79.0  ICD-9-CM: 790.6  2/16/2017 - Present        Statin intolerance ICD-10-CM: Z78.9  ICD-9-CM: 995.27  12/21/2016 - Present        Long-term use of immunosuppressant medication ICD-10-CM: Z79.899  ICD-9-CM: V58.69  11/21/2016 - Present        Seropositive rheumatoid arthritis of multiple sites [facility]) ICD-10-CM: M05.79  ICD-9-CM: 714.0  9/28/2016 - Present        Primary osteoarthritis of both knees ICD-10-CM: M17.0  ICD-9-CM: 715.16  9/28/2016 - Present        Occlusion and stenosis of carotid artery without mention of cerebral infarction ICD-10-CM: I65.29  ICD-9-CM: 433.10  8/23/2013 - Present        Weakness due to cerebrovascular accident ICD-10-CM: KBO2599  ICD-9-CM: Ed Sanchez  4/2/2012 - Present        Neuropathy in diabetes [facility]) ICD-10-CM: E11.40  ICD-9-CM: 250.60, 357.2  4/2/2012 - Present        PAD (peripheral artery disease) ([facility] ICD-10-CM: I73.9  ICD-9-CM: 443.9  9/7/2011 - Present        Carotid bruit ICD-10-CM: R09.89  ICD-9-CM: 785.9  8/31/2011 - Present        Claudication ([facility] ICD-10-CM: I73.9  ICD-9-CM: 443.9  8/31/2011 - Present        Weakness of left arm ICD-10-CM: R29.898  ICD-9-CM: 729.89  8/31/2011 - Present        Hyperlipidemia ICD-10-CM: E78.5  ICD-9-CM: 272.4  1/27/2010 - Present        DM (diabetes mellitus) ([facility] ICD-10-CM: E11.9  ICD-9-CM: 250.00  9/2/2009 - Present        Hypothyroid ICD-10-CM: E03.9  ICD-9-CM: 244.9  9/2/2009 - Present        Colon cancer ([facility] ICD-10-CM: C18.9  ICD-9-CM: 153.9  9/2/2009 - Present        H/O: CVA ICD-9-CM: V12.54  9/2/2009 - Present        Microalbuminuria ICD-10-CM: R80.9  ICD-9-CM: 791.0  9/2/2009 - Present        Age-related osteoporosis without current pathological fracture ICD-10-CM: M81.0  ICD-9-CM: 733.01  9/2/2009 - Present        Essential hypertension ICD-10-CM: I10  ICD-9-CM: 401.9  9/2/2009 - Present        Anemia ICD-10-CM: D64.9  ICD-9-CM: 285.9  9/2/2009 - Present                CDMP Checked:   Yes x     PROBLEM LIST Updated:  Yes x         Signed:   Iglesia Ramirez MD  6/5/2018  9:08 AM

## 2018-06-05 NOTE — PROGRESS NOTES
1610 Bedside and Verbal shift change report given to ARIANA Madden (oncoming nurse) by Judy Bennett and Santi Spivey (offgoing nurse). Report included the following information SBAR, Kardex, Procedure Summary, Intake/Output, MAR, Recent Results and Cardiac Rhythm NSR.   1930 Bedside and Verbal shift change report given to ARIANA Dial (oncoming nurse) by Les Small (offgoing nurse). Report included the following information SBAR, Kardex, Procedure Summary, Intake/Output, MAR, Recent Results and Cardiac Rhythm NSR.

## 2018-06-05 NOTE — PROGRESS NOTES
06/05/18 1800   Six Minute Walk Report (PRE)   Heart Rate 83   O2 Saturation 98   Six Minute Walk Report (POST)   Heart Rate 97   O2 Saturation 99   Distance Walked in Feet (ft) 384 ft. Distance in Meters 117.04 meters   Patient tolerated well. Stated she was \"a little tired\" following the walk.

## 2018-06-05 NOTE — DIABETES MGMT
DTC Progress Note    Recommendations/ Comments:  mg/dL; however pre meal and bedtime > 250 mg/dL    If appropriate please add home medications  - Amaryl 1 mg daily  - Januvia 50 mg daily    Current hospital DM medication: lispro insulin correction scale - she received 13 units yesterday    Chart reviewed on Sandy Ledesma. Patient is a 82 y.o. female with known  Type 2 Diabetes on oral agents (triple therapy):    Prior to admission medications for glucose management: per past medical records  -Amaryl 1mg every morning  -Januvia 50mg daily      A1c:   Lab Results   Component Value Date/Time    Hemoglobin A1c 7.0 (H) 04/18/2018 08:14 AM    Hemoglobin A1c 6.9 (H) 11/30/2011 08:30 AM       Recent Glucose Results:   Lab Results   Component Value Date/Time    GLUCPOC 286 (H) 06/05/2018 11:03 AM    GLUCPOC 157 (H) 06/05/2018 07:07 AM    GLUCPOC 270 (H) 06/04/2018 09:07 PM        Lab Results   Component Value Date/Time    Creatinine 1.06 (H) 06/04/2018 03:26 AM     Estimated Creatinine Clearance: 34.6 mL/min (based on Cr of 1.06). Active Orders   Diet    DIET DIABETIC CONSISTENT CARB Regular; 2 GM NA (House Low NA)        PO intake:   Patient Vitals for the past 72 hrs:   % Diet Eaten   06/05/18 0800 80 %   06/04/18 1432 90 %   06/04/18 1004 100 %   06/04/18 0732 100 %   06/03/18 0800 80 %       Will continue to follow as needed.     Thank you  Carolyne Amin RN, CDE  Pager [phone]

## 2018-06-05 NOTE — PROGRESS NOTES
Discussed patient's order for six minute walk and high fall risk status with Jeni Stovall RN.   RN agreed to coordinate order with PT.

## 2018-06-05 NOTE — PROGRESS NOTES
Problem: Self Care Deficits Care Plan (Adult)  Goal: *Acute Goals and Plan of Care (Insert Text)  Occupational Therapy Goals  Initiated 6/4/2018  1. Patient will perform grooming at the sink with minimal assistance/contact guard assist within 7 day(s). 2.  Patient will perform upper body dressing with independence within 7 day(s). 3.  Patient will perform lower body dressing with supervision/set-up within 7 day(s). 4.  Patient will perform toilet transfers with minimal assistance/contact guard assist within 7 day(s). 5.  Patient will perform all aspects of toileting with supervision/set-up within 7 day(s). 6.  Patient will participate in upper extremity therapeutic exercise/activities with supervision/set-up for 5 minutes within 7 day(s). 7.  Patient will utilize energy conservation techniques during functional activities with verbal cues within 7 day(s). Occupational Therapy TREATMENT  Patient: Richelle Shore (59 y.o. female)  Date: 6/5/2018  Diagnosis: NSTEMI (non-ST elevated myocardial infarction) [facility]) NSTEMI (non-ST elevated myocardial infarction) [facility])       Precautions: Fall  Chart, occupational therapy assessment, plan of care, and goals were reviewed. ASSESSMENT:  Patient progressing in all areas, functional mobility to and from bathroom min A, standing tolerance 3 mins, lower body ADLs overall moderate A, toileting ADLs overall moderate A and washing hands at sink supervision. ADLs limited by standing tolerance, standing balance, ROM, strength, and overall endurance. Patient is highly motivated to be independent and regain her independence for not only her own self worth, but to decrease burden of care on her sons and use of aids. Recommend with nursing patient to complete as able in order to maintain strength, endurance and independence: ADLs with supervision/setup, OOB to chair 3x/day and mobilizing to the bathroom for toileting with 1 assist Rollator. Thank you for your assistance. Progression toward goals:  [x]       Improving appropriately and progressing toward goals  []       Improving slowly and progressing toward goals  []       Not making progress toward goals and plan of care will be adjusted     PLAN:  Patient continues to benefit from skilled intervention to address the above impairments. Continue treatment per established plan of care. Discharge Recommendations:  Rehab  Further Equipment Recommendations for Discharge:  Long handled AE     SUBJECTIVE:   Patient stated That was work.     OBJECTIVE DATA SUMMARY:   Cognitive/Behavioral Status:  Neurologic State: Alert; Appropriate for age  Orientation Level: Oriented X4  Cognition: Appropriate decision making; Appropriate for age attention/concentration; Appropriate safety awareness; Follows commands             Functional Mobility and Transfers for ADLs:  Bed Mobility:  Supine to Sit: Moderate assistance (A LEs and trunk, instruction complete as able on own )    Transfers:  Sit to Stand: Minimum assistance (EOB)  Functional Transfers  Bathroom Mobility: Minimum assistance (setup door and lights)  Toilet Transfer : Moderate assistance (low commode, grab bar, physical A)       Balance:  Sitting: Intact; Without support  Standing: Impaired; Without support (one hand on supportive surface, B LEs leaning on surface)  Standing - Static: Fair  Standing - Dynamic : Fair    ADL Intervention:       Grooming  Washing Hands: Supervision/set-up (standing at sink)                   Lower Body Dressing Assistance  Pants With Elastic Waist: Moderate assistance (standing balance, A over R buttock)  Slip on Shoes with Back: Total assistance (dependent) (aid completed)    Toileting  Bladder Hygiene: Supervision/set-up  Bowel Hygiene: Minimum assistance: increased time, then asking aid to complete with wet wipe for thoroughness  Clothing Management: Moderate assistance (brief and elastic waist pants)         Neuro Re-Education:           Therapeutic Exercises:     Pain:  Pain Scale 1: Numeric (0 - 10)  Pain Intensity 1: 0              Activity Tolerance:   VSS  Please refer to the flowsheet for vital signs taken during this treatment.   After treatment:   [x] Patient left in no apparent distress sitting up in chair  [] Patient left in no apparent distress in bed  [x] Call bell left within reach  [x] Nursing notified  [] Caregiver present  [] Bed alarm activated    COMMUNICATION/COLLABORATION:   The patients plan of care was discussed with: Physical Therapist and Registered Nurse    Asmita Grimes  Time Calculation: 27 mins

## 2018-06-05 NOTE — PROGRESS NOTES
1930: Bedside shift change report given to [address] [place] [address] (oncoming nurse) by Piotr Hamilton (offgoing nurse). Report included the following information SBAR, Kardex, ED Summary, Intake/Output, MAR, Accordion, Recent Results and Med Rec Status. 0730: Bedside shift change report given to [address] [facility] (oncoming nurse) by [address] [place] [address] (offgoing nurse). Report included the following information SBAR, Kardex, ED Summary, Intake/Output, MAR, Accordion, Recent Results and Med Rec Status.

## 2018-06-05 NOTE — PROGRESS NOTES
Hospitalist Progress Note  Waylon Swanson MD  Answering service: [phone] from in house phone  Cell: [phone]      Date of Service:  2018  NAME:  Yusra Arnold  :  1935  MRN:  [id number]      Admission Summary:    This is an 30-year-old woman with a past medical history significant for coronary artery disease, chronic systolic congestive heart failure, metastatic left breast cancer, hypertension, type 2 diabetes, hypothyroidism, rheumatoid arthritis, dyslipidemia, chronic kidney disease, colon cancer, was in her usual state of health until the day of her presentation at the emergency room when the patient developed shortness of breath and chest pain at the rehab facility where the patient is presently receiving rehabilitation.  The symptoms started 2 hours before coming to the emergency room.  This shortness of breath is with little or no activities.  The chest pain is located at the center of the chest with talc radiation.  No known aggravating or relieving factors.  The chest pain is 10/10 in severity, sharp and constant.  At the onset of the patient's symptoms, there was concern for ST elevation myocardial infarction.  The emergency room department was alerted. Wendy Levin the patient arrived at the emergency room, code STEMI was called.  The protocol for code STEMI was followed. Nakita Montejo cardiology came to the emergency room. Rollo Sidle cardiology is of the opinion that the patient has a non-ST elevation myocardial infarction and advised admission to the hospitalist service. Juan Stinson was also found to be in acute on chronic systolic congestive heart failure.      Interval history / Subjective:   She said she feels better, no chest pain or shortness of breath     Assessment & Plan:     NSTEMI, severe multivessel CAD with recent ACS/ NSTEMI  -on aspirin, plavix, coreg and prn nitroglycerine,  -no statin due to previous intolerance  -off oxygen, SaO2 95-96% on RA  -not a surgical candidate  -troponin 1.44  -echo LV EF 30% no obvious wall motion abnormalitis  -cardiologist on board     Acute on chronic systolic CHF NYHA Class IV  -repeated echo on 6/1 LV EF 30%  -Chest x ray 6/2 improving pulmonary edema, unchanged left pleural effusion  -probnp 4165  -continue lasix, coreg and lisinopril  -spironolactone is held for borderline BP  -monitor I/O and daily weight  -cardiologist on board     Acute on chronic hypercapnic hypoxic respiratory failure due to CHF   -pH7. 33 pCO2 51 pO2 276 on FiO2 100%  -improved, off BiPAP now, on lasix,  -off oxygen, on RA SaO2 95-96%, monitor pulse ox     Metastatic left breast cancer   -stable, continue arimidex  -Son, at bedside,  is concerned about her breast ca and want to talk to her Hem/Onc     HTN  -BP normal, continue coreg, lisinopril and lasix for now     DM-II with hyperglycemia   -On sliding scale, monitor finger stick glucose     Hypothyroidism  -continue sythroid     CKD stage III  -creatinine normal  -avoid nephrotoxine  -monitor renal function     Mild hyponatremia   -improving      Mild hyperkalemia   - resolved  -held spironolactone      Hyperphosphatemia   -repeat phosphorus level     Anemia of chronic disease   -H/H stable     Hx of RA  -stable  -on etanercept     Lower sacrum & gluteal cleft chronic wound POA  -wound care on board     DNR, at risk for decompensation          Code status: DNR  DVT prophylaxis: SCD    Care Plan discussed with: Patient/Family, Nurse and   Disposition: [facility]  Discussed with her and her son, Oral Leach at bedside, wants to talk to her Hem/Onc     Hospital Problems  Date Reviewed: 6/1/2018          Codes Class Noted POA    * (Principal)NSTEMI (non-ST elevated myocardial infarction) ([facility] [address] ICD-10-CM: I21.4  ICD-9-CM: 410.70  6/1/2018 Yes        Metastatic breast cancer ([facility] [address] ICD-10-CM: C50.919  ICD-9-CM: 174.9  6/1/2018 Unknown        Goals of care, counseling/discussion ICD-10-CM: Z71.89  ICD-9-CM: V65.49  6/1/2018 Unknown              Vital Signs:    Last 24hrs VS reviewed since prior progress note. Most recent are:  Visit Vitals    /46 (BP 1 Location: Right arm, BP Patient Position: At rest)    Pulse 83    Temp 98.5 °F (36.9 °C)    Resp 16    Ht 5' 1\" (1.549 m)    Wt 60.1 kg (132 lb 6.4 oz)    SpO2 95%    BMI 25.02 kg/m2         Intake/Output Summary (Last 24 hours) at 06/05/18 0826  Last data filed at 06/05/18 2742   Gross per 24 hour   Intake              340 ml   Output                0 ml   Net              340 ml        Physical Examination:             Constitutional:  No acute distress, cooperative, pleasant    ENT:  Oral mucous moist, oropharynx benign. Neck supple,    Resp:  Decreased bronchial breath sound bilaterally. No wheezing/rhonchi/rales. No accessory muscle use   CV:  Regular rhythm, normal rate, no murmurs, gallops, rubs    GI:  Soft, non distended, non tender. normoactive bowel sounds, no hepatosplenomegaly     Musculoskeletal:  No edema    Neurologic:  Moves all extremities. AAOx3, CN II-XII reviewed     Skin:  Good turgor, no rashes or ulcers       Data Review:    Review and/or order of clinical lab test      Labs:     Recent Labs      06/03/18   0310   WBC  12.1*   HGB  9.3*   HCT  30.1*   PLT  286     Recent Labs      06/04/18   0326  06/03/18   0310   NA  134*  131*   K  4.4  4.9   CL  102  100   CO2  23  21   BUN  34*  38*   CREA  1.06*  1.16*   GLU  150*  281*   CA  9.4  9.6     No results for input(s): SGOT, GPT, ALT, AP, TBIL, TBILI, TP, ALB, GLOB, GGT, AML, LPSE in the last 72 hours. No lab exists for component: AMYP, HLPSE  Recent Labs      06/03/18   0310  06/02/18   1841  06/02/18   1316   APTT  65.9*  52.0*  68.8*      No results for input(s): FE, TIBC, PSAT, FERR in the last 72 hours. No results found for: FOL, RBCF   No results for input(s): PH, PCO2, PO2 in the last 72 hours.   No results for input(s): CPK, CKNDX, TROIQ in the last 72 hours.     No lab exists for component: CPKMB  Lab Results   Component Value Date/Time    Cholesterol, total 74 04/16/2018 04:21 AM    HDL Cholesterol 25 04/16/2018 04:21 AM    LDL, calculated 31.6 04/16/2018 04:21 AM    Triglyceride 87 04/16/2018 04:21 AM    CHOL/HDL Ratio 3.0 04/16/2018 04:21 AM     Lab Results   Component Value Date/Time    Glucose (POC) 157 (H) 06/05/2018 07:07 AM    Glucose (POC) 270 (H) 06/04/2018 09:07 PM    Glucose (POC) 266 (H) 06/04/2018 04:48 PM    Glucose (POC) 236 (H) 06/04/2018 11:04 AM    Glucose (POC) 157 (H) 06/04/2018 06:33 AM     Lab Results   Component Value Date/Time    Color YELLOW/STRAW 05/19/2018 02:58 AM    Appearance CLOUDY (A) 05/19/2018 02:58 AM    Specific gravity 1.015 05/19/2018 02:58 AM    pH (UA) 6.0 05/19/2018 02:58 AM    Protein 30 (A) 05/19/2018 02:58 AM    Glucose 500 (A) 05/19/2018 02:58 AM    Ketone NEGATIVE  05/19/2018 02:58 AM    Bilirubin NEGATIVE  05/19/2018 02:58 AM    Urobilinogen 0.2 05/19/2018 02:58 AM    Nitrites NEGATIVE  05/19/2018 02:58 AM    Leukocyte Esterase MODERATE (A) 05/19/2018 02:58 AM    Epithelial cells FEW 05/19/2018 02:58 AM    Bacteria NEGATIVE  05/19/2018 02:58 AM    WBC 20-50 05/19/2018 02:58 AM    RBC 10-20 05/19/2018 02:58 AM         Medications Reviewed:     Current Facility-Administered Medications   Medication Dose Route Frequency    lisinopril (PRINIVIL, ZESTRIL) tablet 2.5 mg  2.5 mg Oral DAILY    morphine (pf) (DURAMORPH) 1 mg/mL injection 2 mg  2 mg IntraVENous Q1H PRN    nitroglycerin (NITROSTAT) tablet 0.4 mg  0.4 mg SubLINGual PRN    carvedilol (COREG) tablet 6.25 mg  6.25 mg Oral BID WITH MEALS    furosemide (LASIX) tablet 20 mg  20 mg Oral DAILY    acetaminophen (TYLENOL) tablet 650 mg  650 mg Oral Q4H PRN    oxyCODONE-acetaminophen (PERCOCET) 5-325 mg per tablet 1 Tab  1 Tab Oral Q4H PRN    anastrozole (ARIMIDEX) tablet 1 mg  1 mg Oral DAILY    aspirin delayed-release tablet 81 mg  81 mg Oral DAILY    clopidogrel (PLAVIX) tablet 75 mg  75 mg Oral DAILY    docusate sodium (COLACE) capsule 100 mg  100 mg Oral BID    etanercept (ENBREL) injection 50 mg (Patient Supplied)  50 mg SubCUTAneous Q7D    evolocumab (REPATHA) syringe 140 mg (Patient Supplied)  140 mg SubCUTAneous Q 14 DAYS    folic acid (FOLVITE) tablet 1 mg  1 mg Oral DAILY    teriparatide (FORTEO) injection 20 mcg (Patient Supplied)  20 mcg SubCUTAneous DAILY    lactobac ac& pc-s.therm-b.anim (BERYL Q/RISAQUAD)  1 Cap Oral DAILY    levothyroxine (SYNTHROID) tablet 88 mcg  88 mcg Oral ACB    sodium chloride (NS) flush 5-10 mL  5-10 mL IntraVENous Q8H    sodium chloride (NS) flush 5-10 mL  5-10 mL IntraVENous PRN    ondansetron (ZOFRAN) injection 4 mg  4 mg IntraVENous Q4H PRN    insulin lispro (HUMALOG) injection   SubCUTAneous AC&HS    glucose chewable tablet 16 g  4 Tab Oral PRN    dextrose (D50W) injection syrg 12.5-25 g  12.5-25 g IntraVENous PRN    glucagon (GLUCAGEN) injection 1 mg  1 mg IntraMUSCular PRN     ______________________________________________________________________  EXPECTED LENGTH OF STAY: 4d 7h  ACTUAL LENGTH OF STAY:          4                 Elise De La Torre MD

## 2018-06-05 NOTE — PROGRESS NOTES
Physical Therapy  6/5/2018    Chart reviewed in preparation for therapy session. Patient requesting that PT/OT return around 2:30 PM to \"use her rollator. \" OT to follow up at that time. PT to follow up tomorrow as appropriate. Continue to highly recommend discharge back to IP rehab as patient is highly motivated to regain her functional independence and would like to lessen the \"burden\" on family/caregivers. Thank you. Alexandra Goins, PT, DPT    Recommend with nursing: Patient to complete as able in order to maintain strength, endurance, and functional independence: OOB to chair 3x/day and mobilizing to the bathroom for toileting needs with A x 1, gait belt, and rollator. Thank you for your assistance.

## 2018-06-05 NOTE — PROGRESS NOTES
ANDRE left message via [facility] to inquire about if [facility] has medically approved patient and when authorization process was started. Awaiting response from [facility] at this time. JOSE Estrada      Received message from Dina with [facility] and Dr. Caprice Funez has medically approved patient for [facility] - insurance authorization started today with patient's insurance.  JOSE Estrada

## 2018-06-06 VITALS
TEMPERATURE: 98.7 F | SYSTOLIC BLOOD PRESSURE: 141 MMHG | WEIGHT: 129.41 LBS | HEART RATE: 84 BPM | BODY MASS INDEX: 24.43 KG/M2 | OXYGEN SATURATION: 100 % | DIASTOLIC BLOOD PRESSURE: 72 MMHG | HEIGHT: 61 IN | RESPIRATION RATE: 18 BRPM

## 2018-06-06 LAB
GLUCOSE BLD STRIP.AUTO-MCNC: 136 MG/DL (ref 65–100)
GLUCOSE BLD STRIP.AUTO-MCNC: 229 MG/DL (ref 65–100)
GLUCOSE BLD STRIP.AUTO-MCNC: 290 MG/DL (ref 65–100)
SERVICE CMNT-IMP: ABNORMAL

## 2018-06-06 PROCEDURE — 74011250637 HC RX REV CODE- 250/637: Performed by: HOSPITALIST

## 2018-06-06 PROCEDURE — 74011250637 HC RX REV CODE- 250/637: Performed by: NURSE PRACTITIONER

## 2018-06-06 PROCEDURE — 97110 THERAPEUTIC EXERCISES: CPT

## 2018-06-06 PROCEDURE — 97116 GAIT TRAINING THERAPY: CPT

## 2018-06-06 PROCEDURE — 82962 GLUCOSE BLOOD TEST: CPT

## 2018-06-06 PROCEDURE — 74011250637 HC RX REV CODE- 250/637: Performed by: INTERNAL MEDICINE

## 2018-06-06 PROCEDURE — 74011636637 HC RX REV CODE- 636/637: Performed by: INTERNAL MEDICINE

## 2018-06-06 RX ORDER — POLYETHYLENE GLYCOL 3350 17 G/17G
17 POWDER, FOR SOLUTION ORAL DAILY
Status: DISCONTINUED | OUTPATIENT
Start: 2018-06-06 | End: 2018-06-06 | Stop reason: HOSPADM

## 2018-06-06 RX ORDER — GLIMEPIRIDE 1 MG/1
1 TABLET ORAL
Status: DISCONTINUED | OUTPATIENT
Start: 2018-06-06 | End: 2018-06-06 | Stop reason: HOSPADM

## 2018-06-06 RX ORDER — NITROGLYCERIN 0.4 MG/1
0.4 TABLET SUBLINGUAL AS NEEDED
Qty: 40 TAB | Refills: 0 | Status: SHIPPED
Start: 2018-06-06

## 2018-06-06 RX ADMIN — DOCUSATE SODIUM 100 MG: 100 CAPSULE, LIQUID FILLED ORAL at 08:52

## 2018-06-06 RX ADMIN — GLIMEPIRIDE 1 MG: 1 TABLET ORAL at 07:34

## 2018-06-06 RX ADMIN — CARVEDILOL 6.25 MG: 6.25 TABLET, FILM COATED ORAL at 08:53

## 2018-06-06 RX ADMIN — ANASTROZOLE 1 MG: 1 TABLET, COATED ORAL at 08:57

## 2018-06-06 RX ADMIN — LEVOTHYROXINE SODIUM 88 MCG: 88 TABLET ORAL at 07:34

## 2018-06-06 RX ADMIN — Medication 1 CAPSULE: at 08:52

## 2018-06-06 RX ADMIN — Medication 10 ML: at 07:34

## 2018-06-06 RX ADMIN — ASPIRIN 81 MG: 81 TABLET, COATED ORAL at 08:52

## 2018-06-06 RX ADMIN — CLOPIDOGREL BISULFATE 75 MG: 75 TABLET ORAL at 08:53

## 2018-06-06 RX ADMIN — LISINOPRIL 2.5 MG: 5 TABLET ORAL at 08:53

## 2018-06-06 RX ADMIN — INSULIN LISPRO 3 UNITS: 100 INJECTION, SOLUTION INTRAVENOUS; SUBCUTANEOUS at 16:35

## 2018-06-06 RX ADMIN — POLYETHYLENE GLYCOL 3350 17 G: 17 POWDER, FOR SOLUTION ORAL at 12:55

## 2018-06-06 RX ADMIN — FUROSEMIDE 20 MG: 20 TABLET ORAL at 08:52

## 2018-06-06 RX ADMIN — INSULIN LISPRO 5 UNITS: 100 INJECTION, SOLUTION INTRAVENOUS; SUBCUTANEOUS at 12:24

## 2018-06-06 RX ADMIN — FOLIC ACID 1 MG: 1 TABLET ORAL at 08:53

## 2018-06-06 RX ADMIN — CARVEDILOL 6.25 MG: 6.25 TABLET, FILM COATED ORAL at 16:35

## 2018-06-06 NOTE — PROGRESS NOTES
Problem: Falls - Risk of  Goal: *Absence of Falls  Document Yaron Fall Risk and appropriate interventions in the flowsheet. Outcome: Progressing Towards Goal  Fall Risk Interventions:  Mobility Interventions: Patient to call before getting OOB, Utilize walker, cane, or other assitive device, PT Consult for mobility concerns    Mentation Interventions: Door open when patient unattended, More frequent rounding, Increase mobility    Medication Interventions: Teach patient to arise slowly, Patient to call before getting OOB    Elimination Interventions: Call light in reach, Patient to call for help with toileting needs    History of Falls Interventions: Door open when patient unattended        Problem: Pressure Injury - Risk of  Goal: *Prevention of pressure injury  Document Calderon Scale and appropriate interventions in the flowsheet. Duoderm  Turn approximately every 2 hours         Outcome: Progressing Towards Goal  Pressure Injury Interventions:  Sensory Interventions: Assess changes in LOC    Moisture Interventions: Check for incontinence Q2 hours and as needed    Activity Interventions: PT/OT evaluation, Pressure redistribution bed/mattress(bed type), Increase time out of bed    Mobility Interventions: HOB 30 degrees or less, Pressure redistribution bed/mattress (bed type), PT/OT evaluation    Nutrition Interventions: Document food/fluid/supplement intake    Friction and Shear Interventions: Apply protective barrier, creams and emollients, Lift sheet               Problem: Heart Failure: Day 4  Goal: *Oxygen saturation within defined limits  Outcome: Progressing Towards Goal  Patient on room air with Sp02 of 95%-100% on room air. Goal: *Hemodynamically stable  Outcome: Progressing Towards Goal  Vital signs are stable and most recent HBG of 9.3. Goal: *Optimal pain control at patient's stated goal  Outcome: Progressing Towards Goal  Patient denies any pain at this time.   Goal: *Anxiety reduced or absent  Outcome: Progressing Towards Goal  Patient calm and cooperative with nursing staff. Goal: *Demonstrates progressive activity  Outcome: Progressing Towards Goal  Patient up with assistance X1 and patient have worked with physical therapy. 0730: Bedside and Verbal shift change report given to Ritu (oncoming nurse) by ARIANA Anderson (offgoing nurse). Report included the following information SBAR, Kardex, Intake/Output, MAR, Recent Results, Med Rec Status and Cardiac Rhythm NSR.

## 2018-06-06 NOTE — PROGRESS NOTES
Problem: Mobility Impaired (Adult and Pediatric)  Goal: *Acute Goals and Plan of Care (Insert Text)  Physical Therapy Goals  Initiated 6/3/2018  1. Patient will move from supine to sit and sit to supine  and roll side to side in bed with modified independence within 7 day(s). 2.  Patient will perform sit to stand with modified independence within 7 day(s). 3.  Patient will ambulate with supervision/set-up for 75 feet with the least restrictive device within 7 day(s). physical Therapy TREATMENT  Patient: Umberto Brunner (70 y.o. female)  Date: 6/6/2018  Diagnosis: NSTEMI (non-ST elevated myocardial infarction) [facility]) NSTEMI (non-ST elevated myocardial infarction) [facility])       Precautions: Fall  Chart, physical therapy assessment, plan of care and goals were reviewed. ASSESSMENT:  Chart reviewed, RN cleared patient for mobility, and patient received in bed with caregiver and son present. PT adjusted rollator height to facilitate improved posture and positioning during gait training. Patient practiced locking rollator, performing sit<>stand transfers, and turning slower 180deg in hallway, required mod verbal cueing to lock wheels each attempt. Following community distance gait training patient returned to room. PT educated family on education provided to patient during session. PT recommending IPR as patient continues to require cueing and assistance for bed mobility and gait training with rollator and presents as high fall risk. Progression toward goals:  [x]    Improving appropriately and progressing toward goals  []    Improving slowly and progressing toward goals  []    Not making progress toward goals and plan of care will be adjusted     PLAN:  Patient continues to benefit from skilled intervention to address the above impairments. Continue treatment per established plan of care.   Discharge Recommendations:  Inpatient Rehab  Further Equipment Recommendations for Discharge:  TBD     SUBJECTIVE: Patient stated Shelbie Davis took a stumble when the rollator began to scoot as I was turning around yesterday.     OBJECTIVE DATA SUMMARY:   Critical Behavior:  Neurologic State: Alert, Appropriate for age  Orientation Level: Oriented X4  Cognition: Follows commands, Appropriate decision making, Appropriate for age attention/concentration, Appropriate safety awareness  Safety/Judgement: Decreased awareness of need for safety  Functional Mobility Training:  Bed Mobility:     Supine to Sit: Minimum assistance  Sit to Supine: Contact guard assistance           Transfers:  Sit to Stand: Contact guard assistance; Additional time  Stand to Sit: Contact guard assistance; Additional time                             Balance:     Ambulation/Gait Training:  Distance (ft): 100 Feet (ft)  Assistive Device: Gait belt;Walker, rollator  Ambulation - Level of Assistance: Contact guard assistance        Gait Abnormalities: Decreased step clearance (increased R foot contact)        Base of Support: Widened  Stance: Left decreased  Speed/Haylee: Pace decreased (<100 feet/min)  Step Length: Left shortened;Right shortened                    Stairs:              Neuro Re-Education:    Therapeutic Exercises:     Pain:  Pain Scale 1: Numeric (0 - 10)  Pain Intensity 1: 0              Activity Tolerance:   Good, no adverse signs/symptoms reported  Please refer to the flowsheet for vital signs taken during this treatment.   After treatment:   []    Patient left in no apparent distress sitting up in chair  [x]    Patient left in no apparent distress in bed  [x]    Call bell left within reach  [x]    Nursing notified  [x]    Caregiver present  []    Bed alarm activated    COMMUNICATION/COLLABORATION:   The patients plan of care was discussed with: Registered Nurse    Coty Bang PT, DPT   Time Calculation: 30 mins

## 2018-06-06 NOTE — PROGRESS NOTES
0745 Bedside shift change report given to Donny Hearn RN (oncoming nurse) by ARIANA Anderson (offgoing nurse). Report included the following information SBAR, Kardex, ED Summary, Procedure Summary, Intake/Output, MAR, Accordion, Recent Results and Cardiac Rhythm NSR, .   1646 DISCHARGE SUMMARY from Nurse    PATIENT INSTRUCTIONS:    After general anesthesia or intravenous sedation, for 24 hours or while taking prescription Narcotics:  · Limit your activities  · Do not drive and operate hazardous machinery  · Do not make important personal or business decisions  · Do  not drink alcoholic beverages  · If you have not urinated within 8 hours after discharge, please contact your surgeon on call. Report the following to your surgeon:  · Excessive pain, swelling, redness or odor of or around the surgical area  · Temperature over 100.5  · Nausea and vomiting lasting longer than 4 hours or if unable to take medications  · Any signs of decreased circulation or nerve impairment to extremity: change in color, persistent  numbness, tingling, coldness or increase pain  · Any questions    What to do at Home:  Recommended activity: Activity as tolerated, see discharge instructions    If you experience any of the following symptoms see discharge instructions, please follow up with see discharge. *  Please give a list of your current medications to your Primary Care Provider. *  Please update this list whenever your medications are discontinued, doses are      changed, or new medications (including over-the-counter products) are added. *  Please carry medication information at all times in case of emergency situations. These are general instructions for a healthy lifestyle:    No smoking/ No tobacco products/ Avoid exposure to second hand smoke  Surgeon General's Warning:  Quitting smoking now greatly reduces serious risk to your health.     Obesity, smoking, and sedentary lifestyle greatly increases your risk for illness    A healthy diet, regular physical exercise & weight monitoring are important for maintaining a healthy lifestyle    You may be retaining fluid if you have a history of heart failure or if you experience any of the following symptoms:  Weight gain of 3 pounds or more overnight or 5 pounds in a week, increased swelling in our hands or feet or shortness of breath while lying flat in bed. Please call your doctor as soon as you notice any of these symptoms; do not wait until your next office visit. Recognize signs and symptoms of STROKE:    F-face looks uneven    A-arms unable to move or move unevenly    S-speech slurred or non-existent    T-time-call 911 as soon as signs and symptoms begin-DO NOT go       Back to bed or wait to see if you get better-TIME IS BRAIN. Warning Signs of HEART ATTACK     Call 911 if you have these symptoms:   Chest discomfort. Most heart attacks involve discomfort in the center of the chest that lasts more than a few minutes, or that goes away and comes back. It can feel like uncomfortable pressure, squeezing, fullness, or pain.  Discomfort in other areas of the upper body. Symptoms can include pain or discomfort in one or both arms, the back, neck, jaw, or stomach.  Shortness of breath with or without chest discomfort.  Other signs may include breaking out in a cold sweat, nausea, or lightheadedness. Don't wait more than five minutes to call [address]! Fast action can save your life. Calling 911 is almost always the fastest way to get lifesaving treatment. Emergency Medical Services staff can begin treatment when they arrive  up to an hour sooner than if someone gets to the hospital by car. The discharge information has been reviewed with the patient. The patient verbalized understanding. Discharge medications reviewed with the patient and appropriate educational materials and side effects teaching were provided.   4276 TRANSFER - OUT REPORT:    Verbal report given to Genny Castellanos RN (name) on [facility] Render  being transferred to [facility](unit) for routine progression of care       Report consisted of patients Situation, Background, Assessment and   Recommendations(SBAR). Information from the following report(s) SBAR, Kardex, ED Summary, Procedure Summary, Intake/Output, MAR, Accordion, Recent Results and Cardiac Rhythm NSR was reviewed with the receiving nurse. Opportunity for questions and clarification was provided. Patient transported to [facility] by son. Problem: Falls - Risk of  Goal: *Absence of Falls  Document Yaron Fall Risk and appropriate interventions in the flowsheet. Outcome: Progressing Towards Goal  Fall Risk Interventions:  Mobility Interventions: Communicate number of staff needed for ambulation/transfer, Patient to call before getting OOB, PT Consult for mobility concerns, OT consult for ADLs, Strengthening exercises (ROM-active/passive), Utilize walker, cane, or other assitive device, Utilize gait belt for transfers/ambulation    Mentation Interventions: Door open when patient unattended, More frequent rounding, Increase mobility    Medication Interventions: Evaluate medications/consider consulting pharmacy, Patient to call before getting OOB, Teach patient to arise slowly, Utilize gait belt for transfers/ambulation    Elimination Interventions: Call light in reach, Patient to call for help with toileting needs, Toileting schedule/hourly rounds    History of Falls Interventions: Door open when patient unattended        Problem: Pressure Injury - Risk of  Goal: *Prevention of pressure injury  Document Calderon Scale and appropriate interventions in the flowsheet.      Duoderm  Turn approximately every 2 hours         Outcome: Progressing Towards Goal  Pressure Injury Interventions:  Sensory Interventions: Assess changes in LOC    Moisture Interventions: Absorbent underpads, Apply protective barrier, creams and emollients, Check for incontinence Q2 hours and as needed    Activity Interventions: Pressure redistribution bed/mattress(bed type), Increase time out of bed, PT/OT evaluation    Mobility Interventions: HOB 30 degrees or less, Pressure redistribution bed/mattress (bed type), PT/OT evaluation    Nutrition Interventions: Document food/fluid/supplement intake, Offer support with meals,snacks and hydration    Friction and Shear Interventions: Apply protective barrier, creams and emollients, Lift sheet               Problem: Heart Failure: Discharge Outcomes  Goal: *Left ventricular function assessment completed prior to or during stay, or planned for post-discharge  Outcome: Progressing Towards Goal  LVEF 30%  Goal: *ACEI prescribed if LVEF less than 40% and no contraindications or ARB prescribed  Outcome: Progressing Towards Goal  Patient taking lisinopril 2.5mg

## 2018-06-06 NOTE — PROGRESS NOTES
Problem: Self Care Deficits Care Plan (Adult)  Goal: *Acute Goals and Plan of Care (Insert Text)  Occupational Therapy Goals  Initiated 6/4/2018  1. Patient will perform grooming at the sink with minimal assistance/contact guard assist within 7 day(s). 2.  Patient will perform upper body dressing with independence within 7 day(s). 3.  Patient will perform lower body dressing with supervision/set-up within 7 day(s). 4.  Patient will perform toilet transfers with minimal assistance/contact guard assist within 7 day(s). 5.  Patient will perform all aspects of toileting with supervision/set-up within 7 day(s). 6.  Patient will participate in upper extremity therapeutic exercise/activities with supervision/set-up for 5 minutes within 7 day(s). 7.  Patient will utilize energy conservation techniques during functional activities with verbal cues within 7 day(s). Occupational Therapy TREATMENT  Patient: Norberto Lozada (71 y.o. female)  Date: 6/6/2018  Diagnosis: NSTEMI (non-ST elevated myocardial infarction) [facility]) NSTEMI (non-ST elevated myocardial infarction) [facility])       Precautions: Fall  Chart, occupational therapy assessment, plan of care, and goals were reviewed. ASSESSMENT:  Patient received supine in bed, declining use of bathroom or standing exercises at sink d/t fatigue from this morning but was agreeable to EOB upper body therapeutic exercises. Completing 1 set, 10 reps of 6/6 exercises. Education provided for arm chair push ups during next time sitting up in chair. Patient indicated good understanding. Requiring Min A for bed mobility, up to Mod A for slipper management. Patient continues to require increased physical assist and time to complete all tasks at this time d/t generalized deconditioning and activity tolerance. Will benefit from d/c to inpatient rehab when medically stable.   Progression toward goals:  []       Improving appropriately and progressing toward goals  [x]       Improving slowly and progressing toward goals  []       Not making progress toward goals and plan of care will be adjusted     PLAN:  Patient continues to benefit from skilled intervention to address the above impairments. Continue treatment per established plan of care. Discharge Recommendations:  Inpatient Rehab  Further Equipment Recommendations for Discharge:  TBD     SUBJECTIVE:   Patient stated I think I just want to do the exercises.     OBJECTIVE DATA SUMMARY:     Functional Mobility and Transfers for ADLs:  Bed Mobility:  Supine to Sit: Minimum assistance  Sit to Supine: Contact guard assistance    Transfers:  Sit to Stand: Contact guard assistance; Additional time   * Per PT session    ADL Intervention:   Patient required up to Mod A to don slippers    Therapeutic Exercises:     EXERCISE   Sets   Reps   Active Active Assist   Passive   Comments   Shoulder flexion 1 10 [x]           []           []              Shoulder abduction 1 10 [x]           []           []              Shoulder retraction 1 10 [x]           []           []              Shoulder elevation 1 10 [x]           []           []              Elbow flexion 1 10 [x]           []           []              Wrist flexion 1 10 [x]           []           []              Arm Chair Pushups 0 0 []           []           []           Education provided to be completed next session     Pain:  Pain Scale 1: Numeric (0 - 10)  Pain Intensity 1: 0              Activity Tolerance:   Good. VSS  Please refer to the flowsheet for vital signs taken during this treatment.   After treatment:   [] Patient left in no apparent distress sitting up in chair  [x] Patient left in no apparent distress in bed  [x] Call bell left within reach  [x] Nursing notified  [] Caregiver present  [] Bed alarm activated    COMMUNICATION/COLLABORATION:   The patients plan of care was discussed with: Physical Therapist and Registered Nurse    Binu Hwang OT  Time Calculation: 12 mins

## 2018-06-06 NOTE — PROGRESS NOTES
ANDRE [facility]agan [facility] and they are still awaiting insurance authorization [place] at this [place]. JOSE Regan      Spoke with Daksha Fagan with [facility] and authorization has been obtained for inpt rehab- per Moisesdrnoreen Garciaome with [facility] son will be picking up patient for transport to have her wheeled downstairs for transport to facilty when he arrives sometime after 4:30 pm.  Will make nursing aware. Envelope to be placed on chart for medicals (kardex, MARS, discharge instructions, EMTALA) to go with patient. Report will need to be called to [phone]. JOSE Regan CM faxed discharge instructions and AVS to [facility] [phone].  JOSE Regan

## 2018-06-06 NOTE — PROGRESS NOTES
Hematology-Oncology Progress Note    Samira Orozco  1935  [id number]  6/6/2018    Follow-up for: met.  Breast cancer,     [x]        Chart notes since last visit reviewed   [x]        Medications reviewed for allergies and interactions       Case discussed with the following:         []                            []        Nursing Staff                                                                         []        Pathologist                                                                        [x]        FAMILY      Subjective:     Spoke with patient who complains of: no further chest pain, breathing comfortably, still weak    Objective:   Patient Vitals for the past 24 hrs:   BP Temp Pulse Resp SpO2 Weight   06/06/18 1121 112/40 97.5 °F (36.4 °C) 81 18 97 % -   06/06/18 0745 111/43 98 °F (36.7 °C) 79 18 97 % -   06/06/18 0705 109/74 98.5 °F (36.9 °C) 86 18 100 % -   06/06/18 0300 128/43 98.5 °F (36.9 °C) 89 18 98 % 58.7 kg (129 lb 6.6 oz)   06/05/18 2341 117/43 97.7 °F (36.5 °C) 83 18 100 % -   06/05/18 1901 102/46 97.7 °F (36.5 °C) 82 18 100 % -   06/05/18 1502 110/57 98.2 °F (36.8 °C) 81 18 98 % -       REVIEW OF SYSTEMS:    Constitutional: negative fever, negative chills, negative weight loss  Eyes:   negative visual changes  ENT:   negative sore throat, tongue or lip swelling  Respiratory:  negative cough, negative dyspnea  Cards:  negative for chest pain, palpitations, lower extremity edema  GI:   negative for nausea, vomiting, diarrhea, and abdominal pain  Neuro:  negative for headaches, dizziness, vertigo  [x]                        Full ROS o/w normal/non contributor    Constitutional:  Patient looks  []        Sick  [x]        Frail  [x]        Better                                                 []        Depressed    HEENT:  [x]   NC                         []   AT               []    ALOPECIA           Eyes: [x]   Normal               []    Icteric  Oropharynx: [x]    Normal []  Thrush               []   Dry  Mucositis: []    None                 Grade: []        I  []        II  []        III  []        IV  Neck:   [x]   Supple                  []  Rigid               JVD:    [x]   ABSENT       []   PRESENT  Lymphadenopathy:   []   None Noted            [x]   PRESENT L axilla  Chest:  [x]   Clear               []    Rhonchi                      Dec'd @     []  Right Base           []   Left Base  BREAST. ..  Left breast mass  CV:             [x]   Regular              []  Irregular               []   Tachy                []   Murmur  Abdominal:   [x]    Soft              []   NON-tender               []   Tender      BS:    []   ABSENT                   []   PRESENT  Liver:     [x]  NON-palp                  []   EDGE- palp  Spleen: [x]   NON-palp                   []  EDGE - palp  Mass:   [x]   ABSENT                          []  PRESENT  Extr:    [x]  Lymphedema             []   Cyanosis      []  Clubbing  Edema:     [x]   NONE       []   PRESENT  Skin:  Intact [x]           Purpura []        Rash: []   ABSENT       []  PRESENT  Neuro:  [x]        Normal  []        Confused      Available labs reviewed:  Labs:    Recent Results (from the past 24 hour(s))   CEA    Collection Time: 06/05/18  3:25 PM   Result Value Ref Range    CEA 12.2 ng/mL   GLUCOSE, POC    Collection Time: 06/05/18  4:36 PM   Result Value Ref Range    Glucose (POC) 241 (H) 65 - 100 mg/dL    Performed by Radha Grant    GLUCOSE, POC    Collection Time: 06/05/18  9:52 PM   Result Value Ref Range    Glucose (POC) 233 (H) 65 - 100 mg/dL    Performed by KAYLIN JOHNSTON    GLUCOSE, POC    Collection Time: 06/06/18  6:46 AM   Result Value Ref Range    Glucose (POC) 136 (H) 65 - 100 mg/dL    Performed by Kayla Bruce    GLUCOSE, POC    Collection Time: 06/06/18 11:25 AM   Result Value Ref Range    Glucose (POC) 290 (H) 65 - 100 mg/dL    Performed by Dalia Espinal  PCT        Available Xrays reviewed:    Chemotherapy monitored and toxicities assessed:    Assessment and Plan   1. Met. ER+ her2 - breast cancer to L axilla , skeleton. ... She has been on arimidex for 5 weeks. .. She has a stable exam and cea is stable at 12 today. .. Ideally she should be on Ibrance +femara,,, neither are on formulary here,  I spoke with her son today and we agreed to try and get the Dasdavid Waller started asap, she is looking pretty stable from an onlcology stand point. I have plans to see her on 6/15 in the office.   2. Parrish Lawler MD

## 2018-06-06 NOTE — PROGRESS NOTES
Hospitalist Progress Note  Carissa Vitale MD  Answering service: [phone] from in house phone  Cell: [phone]      Date of Service:  2018  NAME:  Norberto Lozada  :  1935  MRN:  [id number]      Admission Summary:    This is an 82-year-old woman with a past medical history significant for coronary artery disease, chronic systolic congestive heart failure, metastatic left breast cancer, hypertension, type 2 diabetes, hypothyroidism, rheumatoid arthritis, dyslipidemia, chronic kidney disease, colon cancer, was in her usual state of health until the day of her presentation at the emergency room when the patient developed shortness of breath and chest pain at the rehab facility where the patient is presently receiving rehabilitation.  The symptoms started 2 hours before coming to the emergency room.  This shortness of breath is with little or no activities.  The chest pain is located at the center of the chest with talc radiation.  No known aggravating or relieving factors.  The chest pain is 10/10 in severity, sharp and constant.  At the onset of the patient's symptoms, there was concern for ST elevation myocardial infarction.  The emergency room department was alerted. Calderon Flor the patient arrived at the emergency room, code STEMI was called.  The protocol for code STEMI was followed. [facility] cardiology came to the emergency room. [facility] cardiology is of the opinion that the patient has a non-ST elevation myocardial infarction and advised admission to the hospitalist service. Foreign Mckee was also found to be in acute on chronic systolic congestive heart failure.      Interval history / Subjective:   She said she feels better, no chest pain or shortness of breath     Assessment & Plan:     NSTEMI, severe multivessel CAD with recent ACS/ NSTEMI  -on aspirin, plavix, coreg and prn nitroglycerine,  -no statin due to previous intolerance  -off oxygen, SaO2 95-96% on RA  -not a surgical candidate  -troponin 1.44  -echo LV EF 30% no obvious wall motion abnormalitis  -cardiologist on board     Acute on chronic systolic CHF NYHA Class IV  -repeated echo on 6/1 LV EF 30%  -Chest x ray 6/2 improving pulmonary edema, unchanged left pleural effusion  -probnp 4165  -continue lasix, coreg and lisinopril  -spironolactone is held for borderline BP  -monitor I/O and daily weight  -cardiologist on board     Acute on chronic hypercapnic hypoxic respiratory failure due to CHF   -pH7. 33 pCO2 51 pO2 276 on FiO2 100%  -improved, off BiPAP now, on lasix,  -off oxygen, on RA SaO2 95-96%, monitor pulse ox     Metastatic left breast cancer   -stable, continue arimidex  -Son, at bedside,  is concerned about her breast ca and want to talk to her Hem/Onc     HTN  -BP normal, continue coreg, lisinopril and lasix for now     DM-II with hyperglycemia   -On sliding scale, monitor finger stick glucose     Hypothyroidism  -continue sythroid     CKD stage III  -creatinine normal  -avoid nephrotoxine  -monitor renal function     Mild hyponatremia   -improving      Mild hyperkalemia   - resolved  -held spironolactone      Hyperphosphatemia   -improving     Anemia of chronic disease   -H/H stable     Hx of RA  -stable  -on etanercept     Lower sacrum & gluteal cleft chronic wound POA  -wound care on board     DNR          Code status: DNR  DVT prophylaxis: SCD    Care Plan discussed with: Patient/Family, Nurse and   Disposition: [facility]  Discussed with her and her son, Alley Hanna at bedside,     Hospital Problems  Date Reviewed: 6/1/2018          Codes Class Noted POA    * (Principal)NSTEMI (non-ST elevated myocardial infarction) ([facility] [address] ICD-10-CM: I21.4  ICD-9-CM: 410.70  6/1/2018 Yes        Metastatic breast cancer ([facility] [address] ICD-10-CM: C50.919  ICD-9-CM: 174.9  6/1/2018 Unknown        Goals of care, counseling/discussion ICD-10-CM: Z71.89  ICD-9-CM: V65.49  6/1/2018 Unknown Vital Signs:    Last 24hrs VS reviewed since prior progress note. Most recent are:  Visit Vitals    /43 (BP 1 Location: Right arm, BP Patient Position: At rest)    Pulse 79    Temp 98 °F (36.7 °C)    Resp 18    Ht 5' 1\" (1.549 m)    Wt 58.7 kg (129 lb 6.6 oz)    SpO2 97%    BMI 24.45 kg/m2         Intake/Output Summary (Last 24 hours) at 06/06/18 0954  Last data filed at 06/06/18 0415   Gross per 24 hour   Intake              120 ml   Output                0 ml   Net              120 ml        Physical Examination:             Constitutional:  No acute distress, cooperative, pleasant    ENT:  Oral mucous moist, oropharynx benign. Neck supple,    Resp:  Decreased bronchial breath sound bilaterally. No wheezing/rhonchi/rales. No accessory muscle use   CV:  Regular rhythm, normal rate, no murmurs, gallops, rubs    GI:  Soft, non distended, non tender. normoactive bowel sounds, no hepatosplenomegaly     Musculoskeletal:  No edema    Neurologic:  Moves all extremities. AAOx3, CN II-XII reviewed     Skin:  Good turgor, no rashes or ulcers       Data Review:    Review and/or order of clinical lab test      Labs:     No results for input(s): WBC, HGB, HCT, PLT, HGBEXT, HCTEXT, PLTEXT, HGBEXT, HCTEXT, PLTEXT in the last 72 hours. Recent Labs      06/04/18   0326   NA  134*   K  4.4   CL  102   CO2  23   BUN  34*   CREA  1.06*   GLU  150*   CA  9.4     No results for input(s): SGOT, GPT, ALT, AP, TBIL, TBILI, TP, ALB, GLOB, GGT, AML, LPSE in the last 72 hours. No lab exists for component: AMYP, HLPSE  No results for input(s): INR, PTP, APTT in the last 72 hours. No lab exists for component: INREXT, INREXT   No results for input(s): FE, TIBC, PSAT, FERR in the last 72 hours. No results found for: FOL, RBCF   No results for input(s): PH, PCO2, PO2 in the last 72 hours. No results for input(s): CPK, CKNDX, TROIQ in the last 72 hours.     No lab exists for component: CPKMB  Lab Results   Component Value Date/Time    Cholesterol, total 74 04/16/2018 04:21 AM    HDL Cholesterol 25 04/16/2018 04:21 AM    LDL, calculated 31.6 04/16/2018 04:21 AM    Triglyceride 87 04/16/2018 04:21 AM    CHOL/HDL Ratio 3.0 04/16/2018 04:21 AM     Lab Results   Component Value Date/Time    Glucose (POC) 136 (H) 06/06/2018 06:46 AM    Glucose (POC) 233 (H) 06/05/2018 09:52 PM    Glucose (POC) 241 (H) 06/05/2018 04:36 PM    Glucose (POC) 286 (H) 06/05/2018 11:03 AM    Glucose (POC) 157 (H) 06/05/2018 07:07 AM     Lab Results   Component Value Date/Time    Color YELLOW/STRAW 05/19/2018 02:58 AM    Appearance CLOUDY (A) 05/19/2018 02:58 AM    Specific gravity 1.015 05/19/2018 02:58 AM    pH (UA) 6.0 05/19/2018 02:58 AM    Protein 30 (A) 05/19/2018 02:58 AM    Glucose 500 (A) 05/19/2018 02:58 AM    Ketone NEGATIVE  05/19/2018 02:58 AM    Bilirubin NEGATIVE  05/19/2018 02:58 AM    Urobilinogen 0.2 05/19/2018 02:58 AM    Nitrites NEGATIVE  05/19/2018 02:58 AM    Leukocyte Esterase MODERATE (A) 05/19/2018 02:58 AM    Epithelial cells FEW 05/19/2018 02:58 AM    Bacteria NEGATIVE  05/19/2018 02:58 AM    WBC 20-50 05/19/2018 02:58 AM    RBC 10-20 05/19/2018 02:58 AM         Medications Reviewed:     Current Facility-Administered Medications   Medication Dose Route Frequency    glimepiride (AMARYL) tablet 1 mg  1 mg Oral ACB    lisinopril (PRINIVIL, ZESTRIL) tablet 2.5 mg  2.5 mg Oral DAILY    morphine (pf) (DURAMORPH) 1 mg/mL injection 2 mg  2 mg IntraVENous Q1H PRN    nitroglycerin (NITROSTAT) tablet 0.4 mg  0.4 mg SubLINGual PRN    carvedilol (COREG) tablet 6.25 mg  6.25 mg Oral BID WITH MEALS    furosemide (LASIX) tablet 20 mg  20 mg Oral DAILY    acetaminophen (TYLENOL) tablet 650 mg  650 mg Oral Q4H PRN    oxyCODONE-acetaminophen (PERCOCET) 5-325 mg per tablet 1 Tab  1 Tab Oral Q4H PRN    anastrozole (ARIMIDEX) tablet 1 mg  1 mg Oral DAILY    aspirin delayed-release tablet 81 mg  81 mg Oral DAILY    clopidogrel (PLAVIX) tablet 75 mg  75 mg Oral DAILY    docusate sodium (COLACE) capsule 100 mg  100 mg Oral BID    etanercept (ENBREL) injection 50 mg (Patient Supplied)  50 mg SubCUTAneous Q7D    evolocumab (REPATHA) syringe 140 mg (Patient Supplied)  140 mg SubCUTAneous Q 14 DAYS    folic acid (FOLVITE) tablet 1 mg  1 mg Oral DAILY    teriparatide (FORTEO) injection 20 mcg (Patient Supplied)  20 mcg SubCUTAneous DAILY    lactobac ac& pc-s.therm-b.anim (BERYL Q/RISAQUAD)  1 Cap Oral DAILY    levothyroxine (SYNTHROID) tablet 88 mcg  88 mcg Oral ACB    sodium chloride (NS) flush 5-10 mL  5-10 mL IntraVENous Q8H    sodium chloride (NS) flush 5-10 mL  5-10 mL IntraVENous PRN    ondansetron (ZOFRAN) injection 4 mg  4 mg IntraVENous Q4H PRN    insulin lispro (HUMALOG) injection   SubCUTAneous AC&HS    glucose chewable tablet 16 g  4 Tab Oral PRN    dextrose (D50W) injection syrg 12.5-25 g  12.5-25 g IntraVENous PRN    glucagon (GLUCAGEN) injection 1 mg  1 mg IntraMUSCular PRN     ______________________________________________________________________  EXPECTED LENGTH OF STAY: 4d 7h  ACTUAL LENGTH OF STAY:          5                 Dandre Myers MD

## 2018-06-06 NOTE — DISCHARGE SUMMARY
Discharge Summary       PATIENT ID: Samm Travis  MRN: [id number]   YOB: 1935    DATE OF ADMISSION: 6/1/2018  3:29 AM    DATE OF DISCHARGE: 6/6/2018  PRIMARY CARE PROVIDER: Carolina García MD     ATTENDING PHYSICIAN: Bhavya Garcia MD  DISCHARGING PROVIDER: Lyndsay Henderson MD    To contact this individual call [phone] and ask the  to page. If unavailable ask to be transferred the Adult Hospitalist Department. CONSULTATIONS: IP CONSULT TO HEMATOLOGY    PROCEDURES/SURGERIES: * No surgery found *    ADMITTING DIAGNOSES & HOSPITAL COURSE:     This is an 21-year-old woman with a past medical history significant for coronary artery disease, chronic systolic congestive heart failure, metastatic left breast cancer, hypertension, type 2 diabetes, hypothyroidism, rheumatoid arthritis, dyslipidemia, chronic kidney disease, colon cancer, was in her usual state of health until the day of her presentation at the emergency room when the patient developed shortness of breath and chest pain at the rehab facility where the patient is presently receiving rehabilitation.  The symptoms started 2 hours before coming to the emergency room.  This shortness of breath is with little or no activities.  The chest pain is located at the center of the chest with talc radiation.  No known aggravating or relieving factors.  The chest pain is 10/10 in severity, sharp and constant.  At the onset of the patient's symptoms, there was concern for ST elevation myocardial infarction.  The emergency room department was alerted. Meryl Sitter the patient arrived at the emergency room, code STEMI was called.  The protocol for code STEMI was followed. [facility] cardiology came to the emergency room. [facility] cardiology is of the opinion that the patient has a non-ST elevation myocardial infarction and advised admission to the hospitalist service. Rosanne Saenz was also found to be in acute on chronic systolic congestive heart failure.      NSTEMI, severe multivessel CAD with recent ACS/ NSTEMI  -continue aspirin, plavix, coreg and prn nitroglycerine,  -not on statin due to previous intolerance  -off oxygen, SaO2 95-96% on RA  -not a surgical candidate  -troponin 1.44  -echo LV EF 30% no obvious wall motion abnormalitis  -cardiologist on board   Acute on chronic systolic CHF NYHA Class IV  -repeated echo on 6/1 LV EF 30%  -Chest x ray 6/2 improving pulmonary edema, unchanged left pleural effusion  -probnp 4165  -continue lasix, coreg and lisinopril  -spironolactone is held for hyperkalemia  -monitor I/O and daily weight  -cardiologist on board    Acute on chronic hypercapnic hypoxic respiratory failure due to CHF   -improved, off BiPAP now, on lasix,  -off oxygen, on RA SaO2 95-96%, monitor pulse ox  Metastatic left breast cancer   -stable, continue arimidex    HTN  -BP normal, continue coreg, lisinopril and lasix for now   DM-II with hyperglycemia   -On sliding scale, monitor finger stick glucose    Hypothyroidism  -continue sythroid  CKD stage III  -creatinine normal  -avoid nephrotoxine  -monitor renal function  Mild hyponatremia   -improving   Mild hyperkalemia   - resolved  -held spironolactone   Hyperphosphatemia   -repeat phosphorus level   Anemia of chronic disease   -H/H stable  Hx of RA  -stable  -on etanercept    Lower sacrum & gluteal cleft chronic wound POA  -improved, wound care on board         Code status: DNR  DVT prophylaxis: SCD        DISCHARGE DIAGNOSES / PLAN:      NSTEMI, severe multivessel CAD with recent ACS/ NSTEMI  -continue aspirin, plavix, coreg and prn nitroglycerine,  -not on statin due to previous intolerance  -follow up with cardiologist as an outpatient  Acute on chronic systolic CHF NYHA Class IV  -continue lasix, coreg and lisinopril  -spironolactone is held for hyperkalemia  -monitor I/O and daily weight  -follow up with cardiologist as an outpatient  Acute on chronic hypercapnic hypoxic respiratory failure due to CHF   -resolved, on RA SaO2 95-96%, monitor pulse ox  Metastatic left breast cancer   -stable, continue arimidex    -outpatient follow up with Hem/Onc  HTN  -continue coreg and lisinopril and monitor BP  DM-II with hyperglycemia   -On sliding scale, monitor finger stick glucose    Hypothyroidism  -continue sythroid  CKD stage III  -creatinine normal  Mild hyponatremia   -improving   Mild hyperkalemia   - resolved  -held spironolactone   Hyperphosphatemia   -repeat phosphorus level   Anemia of chronic disease   -H/H stable  Hx of RA  -stable  -on etanercept    Lower sacrum & gluteal cleft chronic wound POA  -improved, wound care on board      PENDING TEST RESULTS:   At the time of discharge the following test results are still pending: none    FOLLOW UP APPOINTMENTS:    Follow-up Information     Follow up With Details Comments Contact Info   1. Joseluis Ni MD In one week  [address] [place] [address] Str.  [phone]     2. [address] [place] [address], [address] On 6/6/2018 in rehab [facility]  [phone]         3. Follow up with Rebbeca Lesch, MD, Cardiologist in one week  4. Teresa Kenny MD, Hematology/Oncology on 6/15/2015    ADDITIONAL CARE RECOMMENDATIONS:     DIET: Diabetic Diet    ACTIVITY: Activity as tolerated    WOUND CARE: none    EQUIPMENT needed: none      DISCHARGE MEDICATIONS:  Current Discharge Medication List      START taking these medications    Details   nitroglycerin (NITROSTAT) 0.4 mg SL tablet 1 Tab by SubLINGual route as needed for Chest Pain. Up to 3 doses. Qty: 40 Tab, Refills: 0         CONTINUE these medications which have NOT CHANGED    Details   FORTEO 20 mcg/dose - 600 mcg/2.4 mL pnij injection INJECT 20MCG ONCE DAILY  Qty: 2.4 mL, Refills: 0      clopidogrel (PLAVIX) 75 mg tab Take 1 Tab by mouth daily.   Qty: 60 Tab, Refills: 0      carvedilol (COREG) 6.25 mg tablet Take 1 Tab by mouth two (2) times daily (with meals). Qty: 90 Tab, Refills: 0      anastrozole (ARIMIDEX) 1 mg tablet Take 1 Tab by mouth daily. Qty: 90 Tab, Refills: 0      docusate sodium (COLACE) 100 mg capsule Take 1 Cap by mouth two (2) times a day for 90 days. Qty: 60 Cap, Refills: 2      furosemide (LASIX) 20 mg tablet Take one pill daily by mouth . Qty: 90 Tab, Refills: 0      L. acidoph & paracasei- S therm- Bifido (BERYL-Q/RISAQUAD) 8 billion cell cap cap Take 1 Cap by mouth daily. Qty: 90 Cap, Refills: 0      polyethylene glycol (MIRALAX) 17 gram packet Take 1 Packet by mouth daily. Qty: 1 Each, Refills: 0      phosphorus (K PHOS NEUTRAL) 250 mg tablet Take 1 Tab by mouth two (2) times a day. Qty: 90 Tab, Refills: 0      lisinopril (PRINIVIL, ZESTRIL) 2.5 mg tablet Take 1 Tab by mouth daily. Qty: 90 Tab, Refills: 0      epoetin celio (EPOGEN;PROCRIT) 10,000 unit/mL injection 1 mL by SubCUTAneous route every Monday, Wednesday, Friday. Indications: ANEMIA DUE TO RENAL FAILURE  Qty: 1 Vial, Refills: 0      glimepiride (AMARYL) 1 mg tablet Take 1 mg by mouth every morning. levothyroxine (SYNTHROID) 88 mcg tablet Take 88 mcg by mouth Daily (before breakfast). etanercept (ENBREL) 50 mg/mL (0.98 mL) injection 1 mL by SubCUTAneous route every seven (7) days. Qty: 4 Syringe, Refills: 6      evolocumab (REPATHA SURECLICK) pen injection 1 mL by SubCUTAneous route every fourteen (14) days. Qty: 2 Pen, Refills: 11    Associated Diagnoses: Other hyperlipidemia      folic acid (FOLVITE) 1 mg tablet TAKE ONE TABLET BY MOUTH DAILY  Qty: 90 Tab, Refills: 2    Associated Diagnoses: Seropositive rheumatoid arthritis of multiple sites (HCC)      sitaGLIPtin (JANUVIA) 25 mg tablet Take 50 mg by mouth daily. VIT C/VIT E/LUTEIN/MIN/OMEGA-3 (OCUVITE PO) Take 1 Tab by mouth daily. MAGNESIUM MALATE Take 400 mg by mouth daily. MULTIVITAMIN WITH MINERALS (HAIR,SKIN & NAILS PO) Take 1 Tab by mouth daily.       aspirin delayed-release 81 mg tablet Take 81 mg by mouth daily. OMEGA-3 FATTY ACIDS/FISH OIL (OMEGA 3 FISH OIL PO) Take 300 mg by mouth daily. ascorbate calcium (MAKAYLA-C) 500 mg Tab Take 1,000 mg by mouth daily. CHOLECALCIFEROL, VITAMIN D3, (VITAMIN D-3 PO) Take 1,000 Units by mouth daily. STOP taking these medications       spironolactone (ALDACTONE) 25 mg tablet Comments:   Reason for Stopping:                 NOTIFY YOUR PHYSICIAN FOR ANY OF THE FOLLOWING:   Fever over 101 degrees for 24 hours. Chest pain, shortness of breath, fever, chills, nausea, vomiting, diarrhea, change in mentation, falling, weakness, bleeding. Severe pain or pain not relieved by medications. Or, any other signs or symptoms that you may have questions about.     DISPOSITION:    Home With:   OT  PT  HH  RN      x Long term SNF/Inpatient Rehab    Independent/assisted living    Hospice    Other:       PATIENT CONDITION AT DISCHARGE:     Functional status    Poor    x Deconditioned     Independent      Cognition   x  Lucid     Forgetful     Dementia      Catheters/lines (plus indication)    Alford     PICC     PEG    x None      Code status     Full code    x DNR      PHYSICAL EXAMINATION AT DISCHARGE:   Refer to Progress Note      CHRONIC MEDICAL DIAGNOSES:  Problem List as of 6/6/2018  Date Reviewed: 6/1/2018          Codes Class Noted - Resolved    * (Principal)NSTEMI (non-ST elevated myocardial infarction) ([facility] ICD-10-CM: I21.4  ICD-9-CM: 410.70  6/1/2018 - Present        Metastatic breast cancer ([facility] ICD-10-CM: C50.919  ICD-9-CM: 174.9  6/1/2018 - Present        Goals of care, counseling/discussion ICD-10-CM: Z71.89  ICD-9-CM: V65.49  6/1/2018 - Present        Acute on chronic systolic HF (heart failure) ([facility]) ICD-10-CM: I50.23  ICD-9-CM: 428.23  5/19/2018 - Present        Acute systolic heart failure ([facility] ICD-10-CM: I50.21  ICD-9-CM: 428.21  4/24/2018 - Present        Altered mental status, unspecified ICD-10-CM: R41.82  ICD-9-CM: 780.97 4/23/2018 - Present        SOB (shortness of breath) ICD-10-CM: R06.02  ICD-9-CM: 786.05  4/16/2018 - Present        CKD (chronic kidney disease) stage 3, GFR 30-59 ml/min ICD-10-CM: N18.3  ICD-9-CM: 585.3  10/25/2017 - Present        Vitamin D deficiency ICD-10-CM: E55.9  ICD-9-CM: 268.9  6/16/2017 - Present        Asymptomatic hyperuricemia ICD-10-CM: E79.0  ICD-9-CM: 790.6  2/16/2017 - Present        Statin intolerance ICD-10-CM: Z78.9  ICD-9-CM: 995.27  12/21/2016 - Present        Long-term use of immunosuppressant medication ICD-10-CM: Z79.899  ICD-9-CM: V58.69  11/21/2016 - Present        Seropositive rheumatoid arthritis of multiple sites [facility]) ICD-10-CM: M05.79  ICD-9-CM: 714.0  9/28/2016 - Present        Primary osteoarthritis of both knees ICD-10-CM: M17.0  ICD-9-CM: 715.16  9/28/2016 - Present        Occlusion and stenosis of carotid artery without mention of cerebral infarction ICD-10-CM: I65.29  ICD-9-CM: 433.10  8/23/2013 - Present        Weakness due to cerebrovascular accident ICD-10-CM: XBY2604  ICD-9-CM: Kelly Hay  4/2/2012 - Present        Neuropathy in diabetes [facility]) ICD-10-CM: E11.40  ICD-9-CM: 250.60, 357.2  4/2/2012 - Present        PAD (peripheral artery disease) ([facility] ICD-10-CM: I73.9  ICD-9-CM: 443.9  9/7/2011 - Present        Carotid bruit ICD-10-CM: R09.89  ICD-9-CM: 785.9  8/31/2011 - Present        Claudication ([facility] ICD-10-CM: I73.9  ICD-9-CM: 443.9  8/31/2011 - Present        Weakness of left arm ICD-10-CM: R29.898  ICD-9-CM: 729.89  8/31/2011 - Present        Hyperlipidemia ICD-10-CM: E78.5  ICD-9-CM: 272.4  1/27/2010 - Present        DM (diabetes mellitus) ([facility] ICD-10-CM: E11.9  ICD-9-CM: 250.00  9/2/2009 - Present        Hypothyroid ICD-10-CM: E03.9  ICD-9-CM: 244.9  9/2/2009 - Present        Colon cancer ([facility] ICD-10-CM: C18.9  ICD-9-CM: 153.9  9/2/2009 - Present        H/O: CVA ICD-9-CM: V12.54  9/2/2009 - Present        Microalbuminuria ICD-10-CM: R80.9  ICD-9-CM: 791.0  9/2/2009 - Present        Age-related osteoporosis without current pathological fracture ICD-10-CM: M81.0  ICD-9-CM: 733.01  9/2/2009 - Present        Essential hypertension ICD-10-CM: I10  ICD-9-CM: 401.9  9/2/2009 - Present        Anemia ICD-10-CM: D64.9  ICD-9-CM: 285.9  9/2/2009 - Present              Greater than 35 minutes were spent with the patient on counseling and coordination of care    Signed:   Delaney Celeste MD  6/6/2018  3:27 PM

## 2018-06-07 ENCOUNTER — PATIENT OUTREACH (OUTPATIENT)
Dept: CARDIOLOGY CLINIC | Age: 83
End: 2018-06-07

## 2018-06-07 LAB — CANCER AG27-29 SERPL-ACNC: 285.7 U/ML (ref 0–38.6)

## 2018-06-07 NOTE — PROGRESS NOTES
Hospital Discharge Follow-Up      Date/Time:  2018 11:55 AM    Patient was admitted to [facility] on  and discharged on  for NSTEMi, . The physician discharge summary was available at the time of outreach. Patient was contacted within 1 business days of discharge. Nn call to HS / request call from nurse - re: discharge and follow up. Pt and son opting for active acute care - [facility] - [phone] -   Follow up oncology appt 6/15 at 12:30 - with  - at [place] office - confirmed and conveyed to [facility]. Checking on cardiology appt - Appt set up for  - 1:40 - Dr. Samy Dc   Call to [facility] [address] - [phone] -Adrianna/ re: date and time/ and reason - she will check with [facility] provider - if barriers/ she will call. Discussed advanced directive and H.S does not have copy -   Will request copy from her son/ 18 -    - 10:30 am - pt's son return call - he does not have a copy of adv directive - he has discussed with hospital what they want and do not want - we will attempt to get this documented in adv directive and dDNR with visit on Wednesday with Dr. Samy Dc -  They do not want chest compression or intubation/ ventilator - they do want medications, nasal oxygen and such - They do not want her on long term life support -     Addendum -  for son Moraima Deras to ask about advanced directive and DNR status - pt was DNR in hospital which ended upon discharge - TO be addressed on  card appt. ttk  Pt has cardiology appt  - Dr. Samy Dc at 1:40 - note to provider. CM note -     Report will need to be called to [phone]. JOSE Vásquez  [facility] faxed discharge instructions and AVS to [facility] [phone]. Sotero Smith,     Patient and her  moved to [place] in  from [facility]. Patient's  of over 48 years  in 2017 of pancreatic cancer.  ( hospice services). Patient lives alone, but has paid caregivers.   She has 3 adult sons-- son Josie Gavin (oldest son), is [facility] [phone]  Top Challenges reviewed with the provider     Multicomorbids - with severe multivessel CAD - recent ACS  Not a surgical candidate  Concomitant breast cancer with bone mets - oncology - Dr. Darcie Loja and respiratory failure in conjunction with CHF exacerbation  CKD -monitor K/ phosphorus  Skin breakdown - Lower sacrum & gluteal cleft chronic wound POA  -improved, wound care on board  Pt and son opted for continued treatment / oncology - and rehab. Method of communication with provider :staff message  Inpatient RRAT score: 79  Was this a readmission? yes   Patient stated reason for the readmission: chest pain/ CHF; respiratory failure / CKD and breast ca    Nurse Navigator (NN) contacted the caregiver by telephone to perform post hospital discharge assessment. Verified name and  with caregiver as identifiers. Provided introduction to self, and explanation of the Nurse Navigator role. Reviewed discharge instructions and red flags with caregiver who verbalized understanding. Caregiver given an opportunity to ask questions and does not have any further questions or concerns at this time. The caregiver agrees to contact the PCP office for questions related to their healthcare. NN provided contact information for future reference. Disease Specific:   CHF   Pt is on daily weights - weight today 128 -   EF 30% HFrEF on 18. Summary of patient's top problems:  1. Breast cancer with metastasis to bone - continued active treatment  2. CAD - with resent stenting - no surgical candidate/ CHF  3. Resp failure d/t CHF/ and renal failure  4. Debility, frailty  5.  Evaluation and tx of angina - medically -     Home Health orders at discharge: DC to [facility]: [address]  Date of initial visit: d/c 18    Durable Medical Equipment ordered/company: [facility] rehab  Durable Medical Equipment received: n/a    Barriers to care? depression, lack of knowledge about disease, stages of grief, utilization of services    Advance Care Planning:   Does patient have an Advance Directive:  not on file  / will discuss with [facility]/ 6/8 - HS does not have  Call to son - to ask for copy -     Medication(s):     New Medications at Discharge: NTG 0.4 mg sl prn  Changed Medications at Discharge: none  Discontinued Medications at Discharge: Spironolactone  Continue Arimidex 1 mg every day, asa 81, Coreg 6.25 mg bid, Plavix 75 mg every day, Enbrel, Repatha q 14 days, Lasix 20 mg every day, Amaryl 1 mg qam, Januvia 50 mg every day, Lisinopril 25. Mg every day  And other supplements-     Medication reconciliation was performed with caregiver, who verbalizes understanding of administration of home medications. There were no barriers to obtaining medications identified at this time. Referral to Pharm D needed: no     PCP/Specialist follow up: Future Appointments  Date Time Provider Prasanna Diana   7/5/2018 11:00 AM MD RIMA Jones    6/15/18      12:30pm     Rashawn Phillips                  Oncology          [place] office - [facility]  Cardiology appt - 6/13 1:40 Dr. Isa Grayson -   Oncology appt -   6/15 12:30 Dr. Kyara Oconnell and adherence of prescribed medication (ie. action, side effects, missed dose, etc.).            5/15/18  Complex medication list - with multiple new meds/ and stopping several meds -   Request to HS - for med list - to compare and review any changes. Mansoor Tijerina RN , CHFN, CCM  NN CAV [place]  [phone]       Supportive resources in place to maintain patient in the community (ie.  Home Health, DME equipment, refer to, medication assistant plan, etc.)            5/15/18  Pt discharged to [facility] - rehab - on 5/11 - for strengthening/ debility  Pt has follow up appt with cardiology 5/25,   Oncology in 3 weeks - Rheumatology - 3 weeks - RA infusions. ttk           Oncology note -  Assessment and Plan   1. Met. ER+ her2 - breast cancer to L axilla , skeleton. ... She has been on arimidex for 5 weeks. .. She has a stable exam and cea is stable at 12 today. .. Ideally she should be on Ibrance +femara,,, neither are on formulary here,  I spoke with her son today and we agreed to try and get the Wayne Dang started asap, she is looking pretty stable from an onlcology stand point. I have plans to see her on 6/15 in the office.   2. Melinda Castellanos MD  _______________________________________________________________    NSTEMI, severe multivessel CAD with recent ACS/ NSTEMI  -continue aspirin, plavix, coreg and prn nitroglycerine,  -not on statin due to previous intolerance  -off oxygen, SaO2 95-96% on RA  -not a surgical candidate  -troponin 1.44  -echo LV EF 30% no obvious wall motion abnormalitis  -cardiologist on board   Acute on chronic systolic CHF NYHA Class IV  -repeated echo on 6/1 LV EF 30%  -Chest x ray 6/2 improving pulmonary edema, unchanged left pleural effusion  -probnp 4165  -continue lasix, coreg and lisinopril  -spironolactone is held for hyperkalemia  -monitor I/O and daily weight  -cardiologist on board    Acute on chronic hypercapnic hypoxic respiratory failure due to CHF   -improved, off BiPAP now, on lasix,  -off oxygen, on RA SaO2 95-96%, monitor pulse ox  Metastatic left breast cancer   -stable, continue arimidex    HTN  -BP normal, continue coreg, lisinopril and lasix for now   DM-II with hyperglycemia   -On sliding scale, monitor finger stick glucose    Hypothyroidism  -continue sythroid  CKD stage III  -creatinine normal  -avoid nephrotoxine  -monitor renal function  Mild hyponatremia   -improving   Mild hyperkalemia   - resolved  -held spironolactone   Hyperphosphatemia   -repeat phosphorus level   Anemia of chronic disease   -H/H stable  Hx of RA  -stable  -on etanercept    Lower sacrum & gluteal cleft chronic wound POA  -improved, wound care on board    Kendra Torres RN , [facility], [facility] [address] [facility]  [phone]

## 2018-06-11 NOTE — PROGRESS NOTES
Goals      Attends follow-up appointments as directed. 6/7/18  Pt to see Dr. Jamil Khan on 6/15 12:30   appt verified- [address] [place] [address] informed    Cardiology appt for week of 6/11-15- appt made for 6/13 1:40 Dr. Shmuel Light  Nn to call HS on 6/7 - for nurse Steve -     Pt under care of Dr. Patria Eisenmenger - at [address] [place] [address]. Adan Lagunas RN , CHFN, CCM  NN CAV Shabbir Puguerita  [phone]       Knowledge and adherence of prescribed medication (ie. action, side effects, missed dose, etc.).            5/15/18  Complex medication list - with multiple new meds/ and stopping several meds -   Request to HS - for med list - to compare and review any changes. 6/7/18 -   Pt D/C with NTG 0.4 mg prn - stop Aldactone  Monitor K and phosphorus - fax to [address] [place] [address] by Dr. Rivera Gaffney at [facility]. Adan Lagunas RN , CHFN, CCM  NN CAV Shea Puffer  [phone]       Supportive resources in place to maintain patient in the community (ie. Home Health, DME equipment, refer to, medication assistant plan, etc.)            5/15/18  Pt discharged to [address] [place] [address] - rehab - on 5/11 - for strengthening/ debility  Pt has follow up appt with cardiology 5/25,   Oncology in 3 weeks - Rheumatology - 3 weeks - RA infusions. ttk    6/7/18   Discharge to HS - rehab on 6/7 - after readmit for chest pain / HFrEF/   D/C to [address] [place] [address] - with daily am weights/ active rehab and condition mgmt  ttk       Supportive resources in place to maintain patient in the community (ie. Home Health, DME equipment, refer to, medication assistant plan, etc.)            6/11/18   Pt is currently at [address] [place] [address] having therapy -   Evaluate if pt needs home health and what action plan is for discharge -  ttk       Understands red flags post discharge.             6/11/18  Daily am weights for CHF management  If using NTG with some frequency - notify cardiology -   For evaluation long acting nitrate  MD on call 24/7 at [phone] - for dyspnea, swelling, weight gain, chest pain.   ttk

## 2018-06-12 ENCOUNTER — APPOINTMENT (OUTPATIENT)
Dept: GENERAL RADIOLOGY | Age: 83
DRG: 189 | End: 2018-06-12
Attending: EMERGENCY MEDICINE
Payer: MEDICARE

## 2018-06-12 ENCOUNTER — HOSPITAL ENCOUNTER (INPATIENT)
Age: 83
LOS: 9 days | Discharge: NURSING, REHAB OR LONG-TERM CARE | DRG: 189 | End: 2018-06-21
Attending: EMERGENCY MEDICINE | Admitting: INTERNAL MEDICINE
Payer: MEDICARE

## 2018-06-12 DIAGNOSIS — I50.43 ACUTE ON CHRONIC COMBINED SYSTOLIC AND DIASTOLIC CONGESTIVE HEART FAILURE (HCC): Primary | ICD-10-CM

## 2018-06-12 LAB
ALBUMIN SERPL-MCNC: 2.5 G/DL (ref 3.5–5)
ALBUMIN/GLOB SERPL: 0.5 {RATIO} (ref 1.1–2.2)
ALP SERPL-CCNC: 668 U/L (ref 45–117)
ALT SERPL-CCNC: 22 U/L (ref 12–78)
ANION GAP SERPL CALC-SCNC: 8 MMOL/L (ref 5–15)
APPEARANCE UR: ABNORMAL
APTT PPP: 32.3 SEC (ref 22.1–32)
APTT PPP: 48.9 SEC (ref 22.1–32)
APTT PPP: 58.6 SEC (ref 22.1–32)
AST SERPL-CCNC: 21 U/L (ref 15–37)
ATRIAL RATE: 110 BPM
BACTERIA URNS QL MICRO: NEGATIVE /HPF
BASOPHILS # BLD: 0 K/UL (ref 0–0.1)
BASOPHILS NFR BLD: 0 % (ref 0–1)
BILIRUB SERPL-MCNC: 0.3 MG/DL (ref 0.2–1)
BILIRUB UR QL: NEGATIVE
BNP SERPL-MCNC: ABNORMAL PG/ML (ref 0–450)
BUN SERPL-MCNC: 33 MG/DL (ref 6–20)
BUN/CREAT SERPL: 29 (ref 12–20)
CALCIUM SERPL-MCNC: 9.4 MG/DL (ref 8.5–10.1)
CALCULATED P AXIS, ECG09: 56 DEGREES
CALCULATED R AXIS, ECG10: 65 DEGREES
CALCULATED T AXIS, ECG11: 153 DEGREES
CHLORIDE SERPL-SCNC: 99 MMOL/L (ref 97–108)
CK MB CFR SERPL CALC: ABNORMAL % (ref 0–2.5)
CK MB CFR SERPL CALC: ABNORMAL % (ref 0–2.5)
CK MB SERPL-MCNC: <1 NG/ML (ref 5–25)
CK MB SERPL-MCNC: <1 NG/ML (ref 5–25)
CK SERPL-CCNC: 18 U/L (ref 26–192)
CK SERPL-CCNC: 21 U/L (ref 26–192)
CK SERPL-CCNC: 21 U/L (ref 26–192)
CO2 SERPL-SCNC: 26 MMOL/L (ref 21–32)
COLOR UR: ABNORMAL
CREAT SERPL-MCNC: 1.14 MG/DL (ref 0.55–1.02)
DIAGNOSIS, 93000: NORMAL
DIFFERENTIAL METHOD BLD: ABNORMAL
EOSINOPHIL # BLD: 0.2 K/UL (ref 0–0.4)
EOSINOPHIL NFR BLD: 2 % (ref 0–7)
EPITH CASTS URNS QL MICRO: ABNORMAL /LPF
ERYTHROCYTE [DISTWIDTH] IN BLOOD BY AUTOMATED COUNT: 19.2 % (ref 11.5–14.5)
GLOBULIN SER CALC-MCNC: 5.1 G/DL (ref 2–4)
GLUCOSE BLD STRIP.AUTO-MCNC: 249 MG/DL (ref 65–100)
GLUCOSE BLD STRIP.AUTO-MCNC: 255 MG/DL (ref 65–100)
GLUCOSE BLD STRIP.AUTO-MCNC: 301 MG/DL (ref 65–100)
GLUCOSE BLD STRIP.AUTO-MCNC: 341 MG/DL (ref 65–100)
GLUCOSE SERPL-MCNC: 326 MG/DL (ref 65–100)
GLUCOSE UR STRIP.AUTO-MCNC: NEGATIVE MG/DL
HCT VFR BLD AUTO: 31 % (ref 35–47)
HGB BLD-MCNC: 9.6 G/DL (ref 11.5–16)
HGB UR QL STRIP: ABNORMAL
IMM GRANULOCYTES # BLD: 0 K/UL (ref 0–0.04)
IMM GRANULOCYTES NFR BLD AUTO: 0 % (ref 0–0.5)
KETONES UR QL STRIP.AUTO: NEGATIVE MG/DL
LACTATE SERPL-SCNC: 1.2 MMOL/L (ref 0.4–2)
LEUKOCYTE ESTERASE UR QL STRIP.AUTO: ABNORMAL
LYMPHOCYTES # BLD: 1.2 K/UL (ref 0.8–3.5)
LYMPHOCYTES NFR BLD: 13 % (ref 12–49)
MAGNESIUM SERPL-MCNC: 1.8 MG/DL (ref 1.6–2.4)
MCH RBC QN AUTO: 27.7 PG (ref 26–34)
MCHC RBC AUTO-ENTMCNC: 31 G/DL (ref 30–36.5)
MCV RBC AUTO: 89.3 FL (ref 80–99)
MONOCYTES # BLD: 1 K/UL (ref 0–1)
MONOCYTES NFR BLD: 11 % (ref 5–13)
NEUTS SEG # BLD: 6.8 K/UL (ref 1.8–8)
NEUTS SEG NFR BLD: 74 % (ref 32–75)
NITRITE UR QL STRIP.AUTO: NEGATIVE
NRBC # BLD: 0 K/UL (ref 0–0.01)
NRBC BLD-RTO: 0 PER 100 WBC
P-R INTERVAL, ECG05: 142 MS
PH UR STRIP: 5.5 [PH] (ref 5–8)
PHOSPHATE SERPL-MCNC: 4.3 MG/DL (ref 2.6–4.7)
PLATELET # BLD AUTO: 306 K/UL (ref 150–400)
PMV BLD AUTO: 9.2 FL (ref 8.9–12.9)
POTASSIUM SERPL-SCNC: 4.6 MMOL/L (ref 3.5–5.1)
PROT SERPL-MCNC: 7.6 G/DL (ref 6.4–8.2)
PROT UR STRIP-MCNC: NEGATIVE MG/DL
Q-T INTERVAL, ECG07: 352 MS
QRS DURATION, ECG06: 92 MS
QTC CALCULATION (BEZET), ECG08: 476 MS
RBC # BLD AUTO: 3.47 M/UL (ref 3.8–5.2)
RBC #/AREA URNS HPF: ABNORMAL /HPF (ref 0–5)
SERVICE CMNT-IMP: ABNORMAL
SODIUM SERPL-SCNC: 133 MMOL/L (ref 136–145)
SP GR UR REFRACTOMETRY: 1.01 (ref 1–1.03)
THERAPEUTIC RANGE,PTTT: ABNORMAL SECS (ref 58–77)
TROPONIN I SERPL-MCNC: 0.12 NG/ML
TROPONIN I SERPL-MCNC: 0.16 NG/ML
UROBILINOGEN UR QL STRIP.AUTO: 0.2 EU/DL (ref 0.2–1)
VENTRICULAR RATE, ECG03: 110 BPM
WBC # BLD AUTO: 9.3 K/UL (ref 3.6–11)
WBC URNS QL MICRO: ABNORMAL /HPF (ref 0–4)
YEAST BUDDING URNS QL: PRESENT
YEAST URNS QL MICRO: PRESENT

## 2018-06-12 PROCEDURE — 74011250636 HC RX REV CODE- 250/636: Performed by: EMERGENCY MEDICINE

## 2018-06-12 PROCEDURE — 96374 THER/PROPH/DIAG INJ IV PUSH: CPT

## 2018-06-12 PROCEDURE — 99285 EMERGENCY DEPT VISIT HI MDM: CPT

## 2018-06-12 PROCEDURE — 84484 ASSAY OF TROPONIN QUANT: CPT | Performed by: EMERGENCY MEDICINE

## 2018-06-12 PROCEDURE — 71045 X-RAY EXAM CHEST 1 VIEW: CPT

## 2018-06-12 PROCEDURE — 77030011256 HC DRSG MEPILEX <16IN NO BORD MOLN -A

## 2018-06-12 PROCEDURE — 81001 URINALYSIS AUTO W/SCOPE: CPT | Performed by: INTERNAL MEDICINE

## 2018-06-12 PROCEDURE — 83735 ASSAY OF MAGNESIUM: CPT | Performed by: INTERNAL MEDICINE

## 2018-06-12 PROCEDURE — 83605 ASSAY OF LACTIC ACID: CPT | Performed by: EMERGENCY MEDICINE

## 2018-06-12 PROCEDURE — 77010033678 HC OXYGEN DAILY

## 2018-06-12 PROCEDURE — 65660000000 HC RM CCU STEPDOWN

## 2018-06-12 PROCEDURE — 85730 THROMBOPLASTIN TIME PARTIAL: CPT | Performed by: INTERNAL MEDICINE

## 2018-06-12 PROCEDURE — 82550 ASSAY OF CK (CPK): CPT | Performed by: EMERGENCY MEDICINE

## 2018-06-12 PROCEDURE — 74011250636 HC RX REV CODE- 250/636: Performed by: INTERNAL MEDICINE

## 2018-06-12 PROCEDURE — 80053 COMPREHEN METABOLIC PANEL: CPT | Performed by: EMERGENCY MEDICINE

## 2018-06-12 PROCEDURE — 84100 ASSAY OF PHOSPHORUS: CPT | Performed by: INTERNAL MEDICINE

## 2018-06-12 PROCEDURE — 93005 ELECTROCARDIOGRAM TRACING: CPT

## 2018-06-12 PROCEDURE — 74011250637 HC RX REV CODE- 250/637: Performed by: INTERNAL MEDICINE

## 2018-06-12 PROCEDURE — 36415 COLL VENOUS BLD VENIPUNCTURE: CPT | Performed by: INTERNAL MEDICINE

## 2018-06-12 PROCEDURE — 74011250637 HC RX REV CODE- 250/637: Performed by: NURSE PRACTITIONER

## 2018-06-12 PROCEDURE — 82962 GLUCOSE BLOOD TEST: CPT

## 2018-06-12 PROCEDURE — 74011636637 HC RX REV CODE- 636/637: Performed by: INTERNAL MEDICINE

## 2018-06-12 PROCEDURE — 83880 ASSAY OF NATRIURETIC PEPTIDE: CPT | Performed by: INTERNAL MEDICINE

## 2018-06-12 PROCEDURE — 82553 CREATINE MB FRACTION: CPT | Performed by: INTERNAL MEDICINE

## 2018-06-12 PROCEDURE — 74011250636 HC RX REV CODE- 250/636: Performed by: NURSE PRACTITIONER

## 2018-06-12 PROCEDURE — 85025 COMPLETE CBC W/AUTO DIFF WBC: CPT | Performed by: EMERGENCY MEDICINE

## 2018-06-12 RX ORDER — ANASTROZOLE 1 MG/1
1 TABLET ORAL DAILY
Status: DISCONTINUED | OUTPATIENT
Start: 2018-06-12 | End: 2018-06-21 | Stop reason: HOSPADM

## 2018-06-12 RX ORDER — MAGNESIUM SULFATE 100 %
4 CRYSTALS MISCELLANEOUS AS NEEDED
Status: DISCONTINUED | OUTPATIENT
Start: 2018-06-12 | End: 2018-06-21 | Stop reason: HOSPADM

## 2018-06-12 RX ORDER — ISOSORBIDE MONONITRATE 30 MG/1
30 TABLET, EXTENDED RELEASE ORAL DAILY
Status: DISCONTINUED | OUTPATIENT
Start: 2018-06-12 | End: 2018-06-14

## 2018-06-12 RX ORDER — CARVEDILOL 6.25 MG/1
6.25 TABLET ORAL 2 TIMES DAILY WITH MEALS
Status: DISCONTINUED | OUTPATIENT
Start: 2018-06-12 | End: 2018-06-13

## 2018-06-12 RX ORDER — B-COMPLEX WITH VITAMIN C
1 TABLET ORAL DAILY
Status: DISCONTINUED | OUTPATIENT
Start: 2018-06-12 | End: 2018-06-21 | Stop reason: HOSPADM

## 2018-06-12 RX ORDER — FUROSEMIDE 10 MG/ML
40 INJECTION INTRAMUSCULAR; INTRAVENOUS EVERY 12 HOURS
Status: DISCONTINUED | OUTPATIENT
Start: 2018-06-12 | End: 2018-06-12

## 2018-06-12 RX ORDER — FUROSEMIDE 10 MG/ML
40 INJECTION INTRAMUSCULAR; INTRAVENOUS
Status: COMPLETED | OUTPATIENT
Start: 2018-06-12 | End: 2018-06-12

## 2018-06-12 RX ORDER — ASPIRIN 81 MG/1
81 TABLET ORAL DAILY
Status: DISCONTINUED | OUTPATIENT
Start: 2018-06-12 | End: 2018-06-21 | Stop reason: HOSPADM

## 2018-06-12 RX ORDER — CLOPIDOGREL BISULFATE 75 MG/1
75 TABLET ORAL DAILY
Status: DISCONTINUED | OUTPATIENT
Start: 2018-06-12 | End: 2018-06-21 | Stop reason: HOSPADM

## 2018-06-12 RX ORDER — HEPARIN SODIUM 10000 [USP'U]/100ML
12-25 INJECTION, SOLUTION INTRAVENOUS
Status: DISCONTINUED | OUTPATIENT
Start: 2018-06-12 | End: 2018-06-13

## 2018-06-12 RX ORDER — ONDANSETRON 2 MG/ML
4 INJECTION INTRAMUSCULAR; INTRAVENOUS
Status: DISCONTINUED | OUTPATIENT
Start: 2018-06-12 | End: 2018-06-21 | Stop reason: HOSPADM

## 2018-06-12 RX ORDER — LEVOTHYROXINE SODIUM 88 UG/1
88 TABLET ORAL
Status: DISCONTINUED | OUTPATIENT
Start: 2018-06-12 | End: 2018-06-21 | Stop reason: HOSPADM

## 2018-06-12 RX ORDER — DEXTROSE 50 % IN WATER (D50W) INTRAVENOUS SYRINGE
12.5-25 AS NEEDED
Status: DISCONTINUED | OUTPATIENT
Start: 2018-06-12 | End: 2018-06-21 | Stop reason: HOSPADM

## 2018-06-12 RX ORDER — FUROSEMIDE 10 MG/ML
40 INJECTION INTRAMUSCULAR; INTRAVENOUS DAILY
Status: DISCONTINUED | OUTPATIENT
Start: 2018-06-12 | End: 2018-06-13

## 2018-06-12 RX ORDER — DOCUSATE SODIUM 100 MG/1
100 CAPSULE, LIQUID FILLED ORAL 2 TIMES DAILY
Status: DISCONTINUED | OUTPATIENT
Start: 2018-06-12 | End: 2018-06-21 | Stop reason: HOSPADM

## 2018-06-12 RX ORDER — INSULIN LISPRO 100 [IU]/ML
INJECTION, SOLUTION INTRAVENOUS; SUBCUTANEOUS
Status: DISCONTINUED | OUTPATIENT
Start: 2018-06-12 | End: 2018-06-21 | Stop reason: HOSPADM

## 2018-06-12 RX ORDER — NITROGLYCERIN 0.4 MG/1
0.4 TABLET SUBLINGUAL AS NEEDED
Status: DISCONTINUED | OUTPATIENT
Start: 2018-06-12 | End: 2018-06-21 | Stop reason: HOSPADM

## 2018-06-12 RX ORDER — ACETAMINOPHEN 325 MG/1
650 TABLET ORAL
Status: DISCONTINUED | OUTPATIENT
Start: 2018-06-12 | End: 2018-06-21 | Stop reason: HOSPADM

## 2018-06-12 RX ORDER — HEPARIN SODIUM 1000 [USP'U]/ML
30 INJECTION, SOLUTION INTRAVENOUS; SUBCUTANEOUS ONCE
Status: COMPLETED | OUTPATIENT
Start: 2018-06-12 | End: 2018-06-12

## 2018-06-12 RX ORDER — ASCORBIC ACID 500 MG
1000 TABLET ORAL DAILY
Status: DISCONTINUED | OUTPATIENT
Start: 2018-06-12 | End: 2018-06-21 | Stop reason: HOSPADM

## 2018-06-12 RX ORDER — CYANOCOBALAMIN (VITAMIN B-12) 500 MCG
1000 TABLET ORAL DAILY
Status: DISCONTINUED | OUTPATIENT
Start: 2018-06-12 | End: 2018-06-21 | Stop reason: HOSPADM

## 2018-06-12 RX ORDER — HEPARIN SODIUM 5000 [USP'U]/ML
60 INJECTION, SOLUTION INTRAVENOUS; SUBCUTANEOUS ONCE
Status: COMPLETED | OUTPATIENT
Start: 2018-06-12 | End: 2018-06-12

## 2018-06-12 RX ORDER — FOLIC ACID 1 MG/1
1 TABLET ORAL DAILY
Status: DISCONTINUED | OUTPATIENT
Start: 2018-06-12 | End: 2018-06-21 | Stop reason: HOSPADM

## 2018-06-12 RX ORDER — LISINOPRIL 5 MG/1
2.5 TABLET ORAL DAILY
Status: DISCONTINUED | OUTPATIENT
Start: 2018-06-12 | End: 2018-06-14

## 2018-06-12 RX ORDER — SODIUM CHLORIDE 0.9 % (FLUSH) 0.9 %
5-10 SYRINGE (ML) INJECTION EVERY 8 HOURS
Status: DISCONTINUED | OUTPATIENT
Start: 2018-06-12 | End: 2018-06-21 | Stop reason: HOSPADM

## 2018-06-12 RX ORDER — SODIUM CHLORIDE 0.9 % (FLUSH) 0.9 %
5-10 SYRINGE (ML) INJECTION AS NEEDED
Status: DISCONTINUED | OUTPATIENT
Start: 2018-06-12 | End: 2018-06-21 | Stop reason: HOSPADM

## 2018-06-12 RX ADMIN — LEVOTHYROXINE SODIUM 88 MCG: 88 TABLET ORAL at 07:17

## 2018-06-12 RX ADMIN — HEPARIN SODIUM AND DEXTROSE 12 UNITS/KG/HR: 10000; 5 INJECTION INTRAVENOUS at 04:26

## 2018-06-12 RX ADMIN — FUROSEMIDE 40 MG: 10 INJECTION, SOLUTION INTRAMUSCULAR; INTRAVENOUS at 12:13

## 2018-06-12 RX ADMIN — INSULIN LISPRO 2 UNITS: 100 INJECTION, SOLUTION INTRAVENOUS; SUBCUTANEOUS at 22:11

## 2018-06-12 RX ADMIN — OXYCODONE HYDROCHLORIDE AND ACETAMINOPHEN 1000 MG: 500 TABLET ORAL at 08:30

## 2018-06-12 RX ADMIN — ISOSORBIDE MONONITRATE 30 MG: 30 TABLET, EXTENDED RELEASE ORAL at 12:13

## 2018-06-12 RX ADMIN — Medication 10 ML: at 07:17

## 2018-06-12 RX ADMIN — Medication 1 CAPSULE: at 08:29

## 2018-06-12 RX ADMIN — ANASTROZOLE 1 MG: 1 TABLET, COATED ORAL at 10:20

## 2018-06-12 RX ADMIN — HEPARIN SODIUM 1640 UNITS: 1000 INJECTION, SOLUTION INTRAVENOUS; SUBCUTANEOUS at 19:54

## 2018-06-12 RX ADMIN — DOCUSATE SODIUM 100 MG: 100 CAPSULE, LIQUID FILLED ORAL at 17:10

## 2018-06-12 RX ADMIN — INSULIN LISPRO 3 UNITS: 100 INJECTION, SOLUTION INTRAVENOUS; SUBCUTANEOUS at 16:55

## 2018-06-12 RX ADMIN — Medication 1 TABLET: at 12:13

## 2018-06-12 RX ADMIN — ASPIRIN 81 MG: 81 TABLET, COATED ORAL at 08:30

## 2018-06-12 RX ADMIN — CHOLECALCIFEROL TAB 10 MCG (400 UNIT) 1000 UNITS: 10 TAB at 08:29

## 2018-06-12 RX ADMIN — INSULIN LISPRO 7 UNITS: 100 INJECTION, SOLUTION INTRAVENOUS; SUBCUTANEOUS at 07:17

## 2018-06-12 RX ADMIN — HEPARIN SODIUM 3500 UNITS: 5000 INJECTION, SOLUTION INTRAVENOUS; SUBCUTANEOUS at 04:24

## 2018-06-12 RX ADMIN — CARVEDILOL 6.25 MG: 6.25 TABLET, FILM COATED ORAL at 17:10

## 2018-06-12 RX ADMIN — FUROSEMIDE 40 MG: 10 INJECTION, SOLUTION INTRAMUSCULAR; INTRAVENOUS at 01:59

## 2018-06-12 RX ADMIN — CARVEDILOL 6.25 MG: 6.25 TABLET, FILM COATED ORAL at 08:30

## 2018-06-12 RX ADMIN — CLOPIDOGREL BISULFATE 75 MG: 75 TABLET ORAL at 08:29

## 2018-06-12 RX ADMIN — DOCUSATE SODIUM 100 MG: 100 CAPSULE, LIQUID FILLED ORAL at 08:29

## 2018-06-12 RX ADMIN — LISINOPRIL 2.5 MG: 5 TABLET ORAL at 08:30

## 2018-06-12 RX ADMIN — FOLIC ACID 1 MG: 1 TABLET ORAL at 08:29

## 2018-06-12 RX ADMIN — Medication 10 ML: at 22:12

## 2018-06-12 RX ADMIN — INSULIN LISPRO 7 UNITS: 100 INJECTION, SOLUTION INTRAVENOUS; SUBCUTANEOUS at 11:30

## 2018-06-12 RX ADMIN — Medication 10 ML: at 14:00

## 2018-06-12 NOTE — IP AVS SNAPSHOT
[address] [facility] [address] 
[phone] Patient: Germaine Staley MRN: [id number]:[id number] About your hospitalization You were admitted on:  2018 You last received care in the:  [facility] 4 CV SERVICES UNIT You were discharged on:  2018 Why you were hospitalized Your primary diagnosis was:  Fluid Overload Your diagnoses also included:  Acute Respiratory Failure (Hcc), Acute On Chronic Systolic Hf (Heart Failure) (Hcc), Elevated Troponin, Cad (Coronary Artery Disease), Acute Renal Failure Superimposed On Stage 3 Chronic Kidney Disease (Hcc), Gout Flare, Hyperglycemia Due To Type 2 Diabetes Mellitus (Hcc) Follow-up Information Follow up With Details Comments Contact [facility]   [address] [place] [address] [address] 
[phone] Briseida Self MD In 1 week hospital follow up [address] [place] [address] [facility] 
[phone] Elizbeth Apgar, MD  Oncology; call for follow up appointment [address] [facility] [address] [place] [address] [address] 
[phone] Keyonna Deal MD  Cardiology; call for follow up appointment [address] [facility] 
[phone] Tacho Quiles MD  Nephrology; call for follow up appointment Yinka Tanner [address] [facility] 
[phone] Your Scheduled Appointments  11:00 AM EDT New Patient with MD Miranda Hendrixva 51 [facility] [address], [address] [facility]  
[phone] Discharge Orders None A check stella indicates which time of day the medication should be taken. My Medications START taking these medications Instructions Each Dose to Equal  
 Morning Noon Evening Bedtime  
 acetaminophen 325 mg tablet Commonly known as:  TYLENOL Your last dose was: Your next dose is: Take 2 Tabs by mouth every four (4) hours as needed. 650 mg  
    
   
   
   
  
 amoxicillin-clavulanate 500-125 mg per tablet Commonly known as:  AUGMENTIN Your last dose was: Your next dose is: Take 1 Tab by mouth every twelve (12) hours. 1 Tab  
    
   
   
   
  
 b-complex with vitamin c tablet Start taking on:  6/22/2018 Your last dose was: Your next dose is: Take 1 Tab by mouth daily. 1 Tab  
    
   
   
   
  
 insulin glargine 100 unit/mL injection Commonly known as:  LANTUS Your last dose was: Your next dose is:    
   
   
 10 Units by SubCUTAneous route daily. Indications: type 2 diabetes mellitus 10 Units  
    
   
   
   
  
 levoFLOXacin 750 mg tablet Commonly known as:  Kailyn Brown Your last dose was: Your next dose is: Take 1 Tab by mouth every fourty-eight (48) hours. Take 1 dose 6/21/18 at 2100  
 750 mg  
    
   
   
   
  
 methyl salicylate-menthol 06-95 % topical cream  
Commonly known as:  Jory Record Your last dose was: Your next dose is:    
   
   
 Apply  to affected area as needed for Pain. APPLY TO right shoulder  Nursing, document site in comments  
     
   
   
   
  
 palbociclib 125 mg Cap Commonly known as:  [employer] Your last dose was: Your next dose is: Take 1 Cap by mouth daily (with lunch). Indications: HORMONE RECEPTOR(+), HER2(-) ADVANCED BREAST CANCER  
 125 mg  
    
   
   
   
  
 predniSONE 5 mg tablet Commonly known as:  Contreras Blair Your last dose was: Your next dose is: Take 10mg daily x2 days then 5mg daily x2 days  
     
   
   
   
  
 simethicone 80 mg chewable tablet Commonly known as:  Edwige Alex Your last dose was: Your next dose is: Take 1 Tab by mouth as needed.  Indications: FLATULENCE  
 80 mg  
    
   
   
   
  
  
 CHANGE how you take these medications Instructions Each Dose to Equal  
 Morning Noon Evening Bedtime  
 furosemide 80 mg tablet Commonly known as:  LASIX What changed:   
- medication strength 
- how much to take 
- how to take this - when to take this 
- additional instructions Your last dose was: Your next dose is: Take 1 Tab by mouth daily. Indications: Pulmonary Edema due to Chronic Heart Failure 80 mg CONTINUE taking these medications Instructions Each Dose to Equal  
 Morning Noon Evening Bedtime  
 anastrozole 1 mg tablet Commonly known as:  ARIMIDEX Your last dose was: Your next dose is: Take 1 Tab by mouth daily. 1 mg  
    
   
   
   
  
 aspirin delayed-release 81 mg tablet Your last dose was: Your next dose is: Take 81 mg by mouth daily. 81 mg  
    
   
   
   
  
 carvedilol 6.25 mg tablet Commonly known as:  Rinku Hernandez Your last dose was: Your next dose is: Take 1 Tab by mouth two (2) times daily (with meals). 6.25 mg  
    
   
   
   
  
 clopidogrel 75 mg Tab Commonly known as:  PLAVIX Your last dose was: Your next dose is: Take 1 Tab by mouth daily. 75 mg  
    
   
   
   
  
 docusate sodium 100 mg capsule Commonly known as:  Loiza Nella Your last dose was: Your next dose is: Take 1 Cap by mouth two (2) times a day for 90 days. 100 mg  
    
   
   
   
  
 epoetin celio 10,000 unit/mL injection Commonly known as:  EPOGEN;PROCRIT Your last dose was: Your next dose is:    
   
   
 1 mL by SubCUTAneous route every Monday, Wednesday, Friday. 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  
 [address] Units MAKAYLA-C 500 mg Tab Generic drug:  ascorbate calcium Your last dose was: Your next dose is: Take 1,000 mg by mouth daily. 1000 mg  
    
   
   
   
  
 etanercept 50 mg/mL (0.98 mL) injection Commonly known as:  ENBREL Your last dose was: Your next dose is:    
   
   
 1 mL by SubCUTAneous route every seven (7) days. 50 mg  
    
   
   
   
  
 evolocumab pen injection Commonly known as:  Ramiro Mandril Your last dose was: Your next dose is:    
   
   
 1 mL by SubCUTAneous route every fourteen (14) days. 140 mg  
    
   
   
   
  
 folic acid 1 mg tablet Commonly known as:  [employer] Your last dose was: Your next dose is: TAKE ONE TABLET BY MOUTH DAILY FORTEO 20 mcg/dose - 600 mcg/2.4 mL Pnij injection Generic drug:  teriparatide Your last dose was: Your next dose is: INJECT 20MCG ONCE DAILY  
     
   
   
   
  
 glimepiride 1 mg tablet Commonly known as:  AMARYL Your last dose was: Your next dose is: Take 1 mg by mouth every morning. 1 mg HAIR,SKIN & NAILS PO Your last dose was: Your next dose is: Take 1 Tab by mouth daily. 1 Tab  
    
   
   
   
  
 L. acidoph & paracasei- S therm- Bifido 8 billion cell Cap cap Commonly known as:  BERYL-Q/RISAQUAD Your last dose was: Your next dose is: Take 1 Cap by mouth daily. 1 Cap  
    
   
   
   
  
 nitroglycerin 0.4 mg SL tablet Commonly known as:  NITROSTAT Your last dose was: Your next dose is:    
   
   
 1 Tab by SubLINGual route as needed for Chest Pain. Up to 3 doses. 0.4 mg  
    
   
   
   
  
 OCUVITE PO Your last dose was: Your next dose is: Take 1 Tab by mouth daily. 1 Tab OMEGA 3 FISH OIL PO Your last dose was: Your next dose is: Take 300 mg by mouth daily.   
 300 mg  
    
   
   
   
  
 polyethylene glycol 17 gram packet Commonly known as:  Cyrilla Brash Your last dose was: Your next dose is: Take 1 Packet by mouth daily. 17 g SYNTHROID 88 mcg tablet Generic drug:  levothyroxine Your last dose was: Your next dose is: Take 88 mcg by mouth Daily (before breakfast). 88 mcg VITAMIN D3 PO Your last dose was: Your next dose is: Take 1,000 Units by mouth daily. 1000 Units STOP taking these medications JANUVIA 25 mg tablet Generic drug:  SITagliptin  
   
  
 lisinopril 2.5 mg tablet Commonly known as:  PRINIVIL, ZESTRIL  
   
  
 MAGNESIUM MALATE  
   
  
 phosphorus 250 mg tablet Commonly known as:  K PHOS NEUTRAL Where to Get Your Medications Information on where to get these meds will be given to you by the nurse or doctor. ! Ask your nurse or doctor about these medications  
  acetaminophen 325 mg tablet  
 amoxicillin-clavulanate 500-125 mg per tablet b-complex with vitamin c tablet  
 furosemide 80 mg tablet  
 insulin glargine 100 unit/mL injection  
 levoFLOXacin 750 mg tablet  
 methyl salicylate-menthol 64-27 % topical cream  
 palbociclib 125 mg Cap  
 predniSONE 5 mg tablet  
 simethicone 80 mg chewable tablet Discharge Instructions ADDITIONAL CARE RECOMMENDATIONS:  
1. Take medications as prescribed. 2. Keep appointment(s) as recommended/scheduled. 3. Fluid restriction 1000ml/day 4. BiPAP qhs with settings IPAP 12 EPAP 6 FiO2 40%. 5. Patient has been set up with outpatient sleep study at [facility] on 7/5 or 7/8. Please make sure pt goes for the study. 6. Accuechecks qACHS with below insulin lispro correction scale: INITIATE CORRECTIVE INSULIN PROTOCOL (ARINA):  
RX ARINA Normal Sensitivity (Average weight) AC (before meals), Q6H, and Q4H CORRECTIONAL SCALE only For Blood Sugar (mg/dl) of :             
140-199=2 units            
200-249=3 units 250-299=5 units 300-349=7 units 350 or greater = Call MD  
Give in addition to basal medications. Do Not Hold for NPO BEDTIME CORRECTIONAL sliding scale when scheduled:  
200-249=2 units 250-299=3 units 300-349=4 units 350 or greater = Call MD  
Give in addition to basal medications. Do Not Hold for NPO Fast Acting - Administer Immediately - or within 15 minutes of start of meal, if mealtime coverage. DIET: Cardiac and Diabetic Diet with 1000ml/day fluid restriction ACTIVITY: PT/OT Eval and Treat WOUND CARE: none EQUIPMENT needed: BiPAP with settings 12/6/40% [employer] Announcement We are excited to announce that we are making your provider's discharge notes available to you in [employer]. You will see these notes when they are completed and signed by the physician that discharged you from your recent hospital stay. If you have any questions or concerns about any information you see in [employer], please call the Health Information Department where you were seen or reach out to your Primary Care Provider for more information about your plan of care. Introducing [facility]! Mateo Contreras introduces [employer] patient portal. Now you can access parts of your medical record, email your doctor's office, and request medication refills online. 1. In your internet browser, go to [email or web address] 2. Click on the First Time User? Click Here link in the Sign In box. You will see the New Member Sign Up page. 3. Enter your [employer] Access Code exactly as it appears below. You will not need to use this code after youve completed the sign-up process. If you do not sign up before the expiration date, you must request a new code. · [employer] Access Code: [email or web address] Expires: 8/9/2018 10:02 AM 
 
 4. Enter the last four digits of your Social Security Number (xxxx) and Date of Birth (mm/dd/yyyy) as indicated and click Submit. You will be taken to the next sign-up page. 5. Create a [employer] ID. This will be your [employer] login ID and cannot be changed, so think of one that is secure and easy to remember. 6. Create a [employer] password. You can change your password at any time. 7. Enter your Password Reset Question and Answer. This can be used at a later time if you forget your password. 8. Enter your e-mail address. You will receive e-mail notification when new information is available in [address] 9. Click Sign Up. You can now view and download portions of your medical record. 10. Click the Download Summary menu link to download a portable copy of your medical information. If you have questions, please visit the Frequently Asked Questions section of the [employer] website. Remember, [employer] is NOT to be used for urgent needs. For medical emergencies, dial 911. Now available from your iPhone and Android! Introducing Eliot Vazquez As a [employer] patient, I wanted to make you aware of our electronic visit tool called Eliot [employer]. [employer] 24/7 allows you to connect within minutes with a medical provider 24 hours a day, seven days a week via a mobile device or tablet or logging into a secure website from your computer. You can access Eliot Vazquez from anywhere in the [place]. A virtual visit might be right for you when you have a simple condition and feel like you just dont want to get out of bed, or cant get away from work for an appointment, when your regular [employer] provider is not available (evenings, weekends or holidays), or when youre out of town and need minor care.   Electronic visits cost only $49 and if the Eliot Vazquez provider determines a prescription is needed to treat your condition, one can be electronically transmitted to a nearby pharmacy*. Please take a moment to enroll today if you have not already done so. The enrollment process is free and takes just a few minutes. To enroll, please download the [employer] 24/7 teresita to your tablet or phone, or visit [email or web address] to enroll on your computer. And, as an [address] [place] [address] patient with a [employer] account, the results of your visits will be scanned into your electronic medical record and your primary care provider will be able to view the scanned results. We urge you to continue to see your regular [employer] provider for your ongoing medical care. And while your primary care provider may not be the one available when you seek a [employer] virtual visit, the peace of mind you get from getting a real diagnosis real time can be priceless. For more information on [employer], view our Frequently Asked Questions (FAQs) at [email or web address]. Sincerely, 
 
Khang Borrego MD 
Chief Medical Officer [employer] *:  certain medications cannot be prescribed via [employer] Unresulted tests-please follow up with your PCP on these results Procedure/Test Authorizing Provider CA 27.29 Agustina Allison MD  
 CBC W/O DIFF Adalgisa Merida MD  
 CBC W/O DIFF Darrol March.  [place]  
 CBC WITH AUTOMATED DIFF Benoit Valentino MD  
 CBC WITH AUTOMATED DIFF Marvin Paredes MD  
 CBC WITH AUTOMATED DIFF Marvin Paredes MD  
 CBC WITH AUTOMATED DIFF Fly Epstein MD  
 CBC WITH AUTOMATED DIFF Edi Stark MD  
 CBC WITH AUTOMATED DIFF Adelaide Amin MD  
 CBC WITH AUTOMATED DIFF Adalgisa Merida MD  
 CK W/ Sydney Tao MD  
 CK W/ CKMB & INDEX Fly Epstein MD  
 CK W/ REFLX CKMB Benoit Valentino MD  
 CREATININE, UR, Gume Hyde MD  
 CULTURE, BLOOD, PAIRED Edi Stark MD  
 CULTURE, BLOOD, PAIRED Adalgisa Merida MD  
 ECG RHYTHM ANALYSIS ADULT Julia Sexton MD  
 EKG, 12 LEAD, INITIAL Tex Cogan, MD  
 EKG, 12 LEAD, INITIAL Julia Sexton MD  
 [address] MD  
 [address] MD  
 LACTIC ACID Tex Cogan, MD  
 LACTIC ACID Julia Sexton MD  
 LD MD SINDY Amin MD Marcey Dunn, MD  
 [address] [facility], MD  
 MAGNESIUM Criss Palmer MD  
 MAGNESIUM Criss Palmer MD  
 [facility]. [place]  
 METABOLIC PANEL, [address], MD  
 METABOLIC PANEL, [address], MD  
 METABOLIC PANEL, Inge [place] NP  
 [facility]ABOL[facility] PANEL, [facility]  [place]  
 METABOLIC PANEL, Chris Blue MD  
 METABOLIC PANEL, Izabel Logan MD  
 METABOLIC PANEL, Sanju Don MD  
 METABOLIC PANEL, Roxanne Ruiz MD  
 METABOLIC PANEL, COMPREHENSIVE Criss Palmer MD  
 METABOLIC PANEL, Dario Avitia MD  
 NT-PRO BNP Silvano Mckeon MD  
 NT-PRO BNP Silvano Mckeon MD  
 NT-PRO BNP Aliyah Pichardo. Rubén, MARIETTA Martinez MD  
 PHOSPHORUS Criss Palemr MD  
 POC G3 - PUL Silvano Mckeon MD  
 POC G3 - Gustavo Rudolph MD  
 PTT Ezekiel Tao MD  
 PTT Ezekiel Toa MD  
 RENAL FUNCTION PANEL Maico De Leon MD  
 RETICULOCYTE COUNT MD Ena Amin MD  
 SAMPLES BEING HELD Tex Cogan, MD  
 SODIUM, UR, Marek Reddy MD  
 TROPONIN I Tex Cogan, MD  
 TROPONIN I Ezekiel Tao MD  
 TROPONIN I Julia Sexton MD  
 UREA NITROGEN, UR, RANDOM Keo Badillo MD  
 URINALYSIS W/ RFLX MD Barak Cole MD  
 VITAMIN B12 Jef Rodas MD  
 XR CHEST PA LAT Criss Palmer MD  
 XR CHEST PORT Tex Cogan, MD  
 XR CHEST PORT Julia Sexton MD  
 XR CHEST PORT Aliyah Pichardo.  Rubén, NP  
  
 Providers Seen During Your Hospitalization Provider Specialty Primary office phone Amy Dallas MD Emergency Medicine [phone] Pamela Leon MD Internal Medicine [phone] Chata Vickers MD Internal Medicine [phone] Cedrick Carmichael MD Internal Medicine [phone] Your Primary Care Physician (PCP) Primary Care Physician Office Phone Office Fax Boom Henriquez [phone] You are allergic to the following Allergen Reactions Statins-Hmg-Coa Reductase Inhibitors Other (comments) Intolerant to statins Sulfa (Sulfonamide Antibiotics) Other (comments)  
 syncope Recent Documentation Height Weight BMI OB Status Smoking Status 1.549 m 57.2 kg 23.83 kg/m2 Hysterectomy Never Smoker Emergency Contacts Name Discharge Info Relation Home Work Mobile [address] CAREGIVER [3] Son [22] [phone] [address] CAREGIVER [3] Son [22] [phone] Patient Belongings The following personal items are in your possession at time of discharge: 
  Dental Appliances: None         Home Medications: None (at this time)   Jewelry: None  Clothing: None Discharge Instructions Attachments/References HEART FAILURE: AVOIDING TRIGGERS (ENGLISH) Patient Handouts Avoiding Triggers With Heart Failure: Care Instructions Your Care Instructions Triggers are anything that make your heart failure flare up. A flare-up is also called \"sudden heart failure\" or \"acute heart failure. \" When you have a flare-up, fluid builds up in your lungs, and you have problems breathing. You might need to go to the hospital. By watching for changes in your condition and avoiding triggers, you can prevent heart failure flare-ups. Follow-up care is a key part of your treatment and safety.  Be sure to make and go to all appointments, and call your doctor if you are having problems. It's also a good idea to know your test results and keep a list of the medicines you take. How can you care for yourself at home? Watch for changes in your weight and condition · Weigh yourself without clothing at the same time each day. Record your weight. Call your doctor if you have sudden weight gain, such as more than 2 to 3 pounds in a day or 5 pounds in a week. (Your doctor may suggest a different range of weight gain.) A sudden weight gain may mean that your heart failure is getting worse. · Keep a daily record of your symptoms. Write down any changes in how you feel, such as new shortness of breath, cough, or problems eating. Also record if your ankles are more swollen than usual and if you feel more tired than usual. Note anything that you ate or did that could have triggered these changes. Limit sodium Sodium causes your body to hold on to extra water. This may cause your heart failure symptoms to get worse. People get most of their sodium from processed foods. Fast food and restaurant meals also tend to be very high in sodium. · Your doctor may suggest that you limit sodium to 2,000 milligrams (mg) a day or less. That is less than 1 teaspoon of salt a day, including all the salt you eat in cooking or in packaged foods. · Read food labels on cans and food packages. They tell you how much sodium you get in one serving. Check the serving size. If you eat more than one serving, you are getting more sodium. · Be aware that sodium can come in forms other than salt, including monosodium glutamate (MSG), sodium citrate, and sodium bicarbonate (baking soda). MSG is often added to Asian food. You can sometimes ask for food without MSG or salt. · Slowly reducing salt will help you adjust to the taste. Take the salt shaker off the table. · Flavor your food with garlic, lemon juice, onion, vinegar, herbs, and spices instead of salt.  Do not use soy sauce, steak sauce, onion salt, garlic salt, mustard, or ketchup on your food, unless it is labeled \"low-sodium\" or \"low-salt. \" 
· Make your own salad dressings, sauces, and ketchup without adding salt. · Use fresh or frozen ingredients, instead of canned ones, whenever you can. Choose low-sodium canned goods. · Eat less processed food and food from restaurants, including fast food. Exercise as directed Moderate, regular exercise is very good for your heart. It improves your blood flow and helps control your weight. But too much exercise can stress your heart and cause a heart failure flare-up. · Check with your doctor before you start an exercise program. 
· Walking is an easy way to get exercise. Start out slowly. Gradually increase the length and pace of your walk. Swimming, riding a bike, and using a treadmill are also good forms of exercise. · When you exercise, watch for signs that your heart is working too hard. You are pushing yourself too hard if you cannot talk while you are exercising. If you become short of breath or dizzy or have chest pain, stop, sit down, and rest. 
· Do not exercise when you do not feel well. Take medicines correctly · Take your medicines exactly as prescribed. Call your doctor if you think you are having a problem with your medicine. · Make a list of all the medicines you take. Include those prescribed to you by other doctors and any over-the-counter medicines, vitamins, or supplements you take. Take this list with you when you go to any doctor. · Take your medicines at the same time every day. It may help you to post a list of all the medicines you take every day and what time of day you take them. · Make taking your medicine as simple as you can. Plan times to take your medicines when you are doing other things, such as eating a meal or getting ready for bed. This will make it easier to remember to take your medicines. · Get organized. Use helpful tools, such as daily or weekly pill containers. When should you call for help? Call 911 if you have symptoms of sudden heart failure such as: 
? · You have severe trouble breathing. ? · You cough up pink, foamy mucus. ? · You have a new irregular or rapid heartbeat. ?Call your doctor now or seek immediate medical care if: 
? · You have new or increased shortness of breath. ? · You are dizzy or lightheaded, or you feel like you may faint. ? · You have sudden weight gain, such as more than 2 to 3 pounds in a day or 5 pounds in a week. (Your doctor may suggest a different range of weight gain.) ? · You have increased swelling in your legs, ankles, or feet. ? · You are suddenly so tired or weak that you cannot do your usual activities. ? Watch closely for changes in your health, and be sure to contact your doctor if you develop new symptoms. Where can you learn more? Go to http://david-noni.info/. Enter A922 in the search box to learn more about \"Avoiding Triggers With Heart Failure: Care Instructions. \" Current as of: September 21, 2016 Content Version: 11.4 © 8314-2962 [employer]. Care instructions adapted under license by 1-800-[employer]CT[employer] (which disclaims liability or warranty for this information). If you have questions about a medical condition or this instruction, always ask your healthcare professional. [address] any warranty or liability for your use of this information. Please provide this summary of care documentation to your next provider. Signatures-by signing, you are acknowledging that this After Visit Summary has been reviewed with you and you have received a copy. Patient Signature:  ____________________________________________________________ Date:  ____________________________________________________________  
  
[facility] Provider Signature:  ____________________________________________________________ Date:  ____________________________________________________________

## 2018-06-12 NOTE — IP AVS SNAPSHOT
[address] 
[phone] Patient: Arthur Hdez MRN: [id number] CLB:[birth year] A check stella indicates which time of day the medication should be taken. My Medications START taking these medications Instructions Each Dose to Equal  
 Morning Noon Evening Bedtime  
 acetaminophen 325 mg tablet Commonly known as:  TYLENOL Your last dose was: Your next dose is: Take 2 Tabs by mouth every four (4) hours as needed. 650 mg  
    
   
   
   
  
 amoxicillin-clavulanate 500-125 mg per tablet Commonly known as:  AUGMENTIN Your last dose was: Your next dose is: Take 1 Tab by mouth every twelve (12) hours. 1 Tab  
    
   
   
   
  
 b-complex with vitamin c tablet Start taking on:  6/22/2018 Your last dose was: Your next dose is: Take 1 Tab by mouth daily. 1 Tab  
    
   
   
   
  
 insulin glargine 100 unit/mL injection Commonly known as:  LANTUS Your last dose was: Your next dose is:    
   
   
 10 Units by SubCUTAneous route daily. Indications: type 2 diabetes mellitus 10 Units  
    
   
   
   
  
 levoFLOXacin 750 mg tablet Commonly known as:  James Backbone Your last dose was: Your next dose is: Take 1 Tab by mouth every fourty-eight (48) hours. Take 1 dose 6/21/18 at 2100  
 750 mg  
    
   
   
   
  
 methyl salicylate-menthol 01-28 % topical cream  
Commonly known as:  Lawanda Lee Your last dose was: Your next dose is:    
   
   
 Apply  to affected area as needed for Pain. APPLY TO right shoulder  Nursing, document site in comments  
     
   
   
   
  
 palbociclib 125 mg Cap Commonly known as:  [employer] Your last dose was: Your next dose is: Take 1 Cap by mouth daily (with lunch).  Indications: HORMONE RECEPTOR(+), HER2(-) ADVANCED BREAST CANCER  
 125 mg  
    
   
   
   
  
 predniSONE 5 mg tablet Commonly known as:  Lynette Cornell Your last dose was: Your next dose is: Take 10mg daily x2 days then 5mg daily x2 days  
     
   
   
   
  
 simethicone 80 mg chewable tablet Commonly known as:  Stephany Howard Your last dose was: Your next dose is: Take 1 Tab by mouth as needed. Indications: FLATULENCE  
 80 mg CHANGE how you take these medications Instructions Each Dose to Equal  
 Morning Noon Evening Bedtime  
 furosemide 80 mg tablet Commonly known as:  LASIX What changed:   
- medication strength 
- how much to take 
- how to take this - when to take this 
- additional instructions Your last dose was: Your next dose is: Take 1 Tab by mouth daily. Indications: Pulmonary Edema due to Chronic Heart Failure 80 mg CONTINUE taking these medications Instructions Each Dose to Equal  
 Morning Noon Evening Bedtime  
 anastrozole 1 mg tablet Commonly known as:  ARIMIDEX Your last dose was: Your next dose is: Take 1 Tab by mouth daily. 1 mg  
    
   
   
   
  
 aspirin delayed-release 81 mg tablet Your last dose was: Your next dose is: Take 81 mg by mouth daily. 81 mg  
    
   
   
   
  
 carvedilol 6.25 mg tablet Commonly known as:  Carrolyn Peabody Your last dose was: Your next dose is: Take 1 Tab by mouth two (2) times daily (with meals). 6.25 mg  
    
   
   
   
  
 clopidogrel 75 mg Tab Commonly known as:  PLAVIX Your last dose was: Your next dose is: Take 1 Tab by mouth daily. 75 mg  
    
   
   
   
  
 docusate sodium 100 mg capsule Commonly known as:  Girish Tay Your last dose was: Your next dose is: Take 1 Cap by mouth two (2) times a day for 90 days. 100 mg  
    
   
   
   
  
 epoetin celio 10,000 unit/mL injection Commonly known as:  EPOGEN;PROCRIT Your last dose was: Your next dose is:    
   
   
 1 mL by SubCUTAneous route every Monday, Wednesday, Friday. 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  
 71728 Units MAKAYLA-C 500 mg Tab Generic drug:  ascorbate calcium Your last dose was: Your next dose is: Take 1,000 mg by mouth daily. 1000 mg  
    
   
   
   
  
 etanercept 50 mg/mL (0.98 mL) injection Commonly known as:  ENBREL Your last dose was: Your next dose is:    
   
   
 1 mL by SubCUTAneous route every seven (7) days. 50 mg  
    
   
   
   
  
 evolocumab pen injection Commonly known as:  Gio Monge Your last dose was: Your next dose is:    
   
   
 1 mL by SubCUTAneous route every fourteen (14) days. 140 mg  
    
   
   
   
  
 folic acid 1 mg tablet Commonly known as:  [employer] Your last dose was: Your next dose is: TAKE ONE TABLET BY MOUTH DAILY FORTEO 20 mcg/dose - 600 mcg/2.4 mL Pnij injection Generic drug:  teriparatide Your last dose was: Your next dose is: INJECT 20MCG ONCE DAILY  
     
   
   
   
  
 glimepiride 1 mg tablet Commonly known as:  AMARYL Your last dose was: Your next dose is: Take 1 mg by mouth every morning. 1 mg HAIR,SKIN & NAILS PO Your last dose was: Your next dose is: Take 1 Tab by mouth daily. 1 Tab  
    
   
   
   
  
 L. acidoph & paracasei- S therm- Bifido 8 billion cell Cap cap Commonly known as:  BERYL-Q/RISAQUAD Your last dose was: Your next dose is: Take 1 Cap by mouth daily. 1 Cap  
    
   
   
   
  
 nitroglycerin 0.4 mg SL tablet Commonly known as:  NITROSTAT Your last dose was: Your next dose is:    
   
   
 1 Tab by SubLINGual route as needed for Chest Pain. Up to 3 doses. 0.4 mg  
    
   
   
   
  
 OCUVITE PO Your last dose was: Your next dose is: Take 1 Tab by mouth daily. 1 Tab OMEGA 3 FISH OIL PO Your last dose was: Your next dose is: Take 300 mg by mouth daily. 300 mg  
    
   
   
   
  
 polyethylene glycol 17 gram packet Commonly known as:  Katarzyna Cancel Your last dose was: Your next dose is: Take 1 Packet by mouth daily. 17 g SYNTHROID 88 mcg tablet Generic drug:  levothyroxine Your last dose was: Your next dose is: Take 88 mcg by mouth Daily (before breakfast). 88 mcg VITAMIN D3 PO Your last dose was: Your next dose is: Take 1,000 Units by mouth daily. 1000 Units STOP taking these medications JANUVIA 25 mg tablet Generic drug:  SITagliptin  
   
  
 lisinopril 2.5 mg tablet Commonly known as:  PRINIVIL, ZESTRIL  
   
  
 MAGNESIUM MALATE  
   
  
 phosphorus 250 mg tablet Commonly known as:  K PHOS NEUTRAL Where to Get Your Medications Information on where to get these meds will be given to you by the nurse or doctor. ! Ask your nurse or doctor about these medications  
  acetaminophen 325 mg tablet  
 amoxicillin-clavulanate 500-125 mg per tablet b-complex with vitamin c tablet  
 furosemide 80 mg tablet  
 insulin glargine 100 unit/mL injection  
 levoFLOXacin 750 mg tablet  
 methyl salicylate-menthol 25-99 % topical cream  
 palbociclib 125 mg Cap  
 predniSONE 5 mg tablet  
 simethicone 80 mg chewable tablet

## 2018-06-12 NOTE — WOUND CARE
Wound Care Note:     New consult placed by nurse request for Sacrum wound POA    Chart shows:  Admitted for NSTEMI   Past Medical History:   Diagnosis Date    Anemia 9/2/2009    Colon cancer ([facility] [address] 9/2/2009    surgery/chemo    Colon cancer ([facility] [address] 9/2/2009    DM (diabetes mellitus) ([facility] [address] 9/2/2009    GERD (gastroesophageal reflux disease)     H/O: CVA 9/2/2009    slight l sided weakness    Hypothyroid 9/2/2009    Ill-defined condition     seasonal allergies    Microalbuminuria 9/2/2009    Osteoporosis 9/2/2009    Other and unspecified hyperlipidemia 1/27/2010    Rheumatoid arthritis involving ankle ([facility] [address] 9/28/2016    Rheumatoid arthritis(714.0) 9/2/2009    Unspecified essential hypertension 9/2/2009    Weakness due to cerebrovascular accident      WBC = 9.3 on 6/12/18  Admitted from [address]   Patient is A&O x 4, communicative, incontinent with no assistance needed in repositioning. Bed: Total Care Sport  Patient wearing briefs for incontinence and has a Pure Wick  Diet: Diabetic consistent carb regular; 2 grams sodium  Patient reports no pain    Bilateral heels, buttocks, and sacral skin intact and without erythema. 1. POA sacral wound has resolved, 100% epithealized tissue, past admission she had a Stage II. Patient would like a foam dressing applied for cushion. Foam dressing applied; protection only and date placed on dressing. Patient repositioned supine. Heels offloaded on pillow. Recommendations:    Sacrum- foam dressing for protection only. Please change if wet from urine/stool or coming loose. Dressing is at patient request for comfort and sacrum can be left open to air should patient change her mind. Skin Care & Pressure Prevention:  Minimize layers of linen/pads under patient to optimize support surface. Turn/reposition approximately every 2 hours and offload heels.   Manage incontinence / promote continence     Discussed above plan with patient & Татьяна Eaton RN    Transition of Care: Plan to follow as needed while admitted to hospital.    FREDO GarciaN, RN, [facility], [facility].  office [phone]  pager [phone] or call  to page

## 2018-06-12 NOTE — NURSE NAVIGATOR
Chart reviewed by Heart Failure Nurse Navigator. Heart Failure database completed. EF:  30     ACEi/ARB: Lisinopril    BB: Coreg    Aldosterone Antagonist: Discontinued 6/7    CRT Not indicated. NYHA Functional Class Requested via provider message on [employer]. Heart Failure Teach Back in Patient Education. Heart Failure Avoiding Triggers on Discharge Instructions. Cardiologist CAV    Outpatient nurse navigator notified of readmission from [facility]. Prior admission 4/6-5/11 Sepsis. 5/19-5/25  NSTEMI  6/1- 6/6 NSTEMI      Post discharge follow up phone call to be made within 48-72 hours of discharge.

## 2018-06-12 NOTE — ED NOTES
Reassessed patient at this time. She has fallen to sleep but is easily woken up. Breathing appears better and lungs sound less wet. She has been given lasix and set up with a [facility] for incontinence. Otherwise she has no complaints. She and her son were told that she would be admitted. CM x 3. Call bell within reach of patient.

## 2018-06-12 NOTE — H&P
[address]   HISTORY AND PHYSICAL      Tiny Zimmerman  MR#: [id number]  : 1935  ACCOUNT #: [de-identified]   ADMIT DATE: 2018    Admission order was placed at 0339 hours, and the patient was seen shortly after that. PRIMARY CARE PHYSICIAN:  Jessie Ascencio MD      SOURCE OF INFORMATION:  The patient, who is not a good historian, and the patient's son, who was present at the bedside and provided the information contained in the history and physical.      CHIEF COMPLAINT:  Shortness of breath. HISTORY OF PRESENT ILLNESS:  This is an 74-year-old woman with a past medical history significant for coronary artery disease, chronic systolic congestive heart failure, metastatic left breast cancer, hypertension, type 2 diabetes, hypothyroidism, dyslipidemia, rheumatoid arthritis, chronic kidney disease who was in her usual state of health until the day of her presentation at the emergency room when the patient developed shortness of breath at the rehab facility where the patient is receiving rehabilitation. The patient has had 4 myocardial infarctions since April of this year, each episode presenting with shortness of breath. The patient's son was present at [facility] where the patient is receiving rehabilitation when she developed shortness of breath. She received 3 doses of nitroglycerin, and he advised the facility to send the patient to the emergency room for further evaluation. It was reported that the patient's oxygen saturation was 87%. The patient has had multiple admissions to this hospital for evaluation and treatment of a non-ST elevation myocardial infarction. The last admission to this hospital was from 2018 to 2018. The patient was admitted and treated for non-ST elevation myocardial cardiac infarction. Since April, the patient has been having recurrent myocardial infarctions.   She has had a cardiac catheterization that shows a significant lesion, but patient is not a candidate for surgical intervention or PCI/stent placement. When the patient arrived at the emergency room, she was found to have an elevated troponin level. The patient was referred to the hospitalist service for evaluation for admission. No history of fever, no rigors and no chills. PAST MEDICAL HISTORY:  Chronic systolic congestive heart failure, coronary artery disease, hypertension, metastatic left breast cancer, type 2 diabetes, hypothyroidism, rheumatoid arthritis, dyslipidemia, chronic kidney disease stage III. ALLERGIES:  PATIENT IS ALLERGIC TO STATINS AND SULFA. MEDICATIONS:  Arimidex 1 mg daily, aspirin 81 mg daily, Coreg 6.25 mg twice daily, Plavix 75 mg daily, Colace 100 mg twice daily, Enbrel 50 mg subcutaneously every 7 days, Repatha dosage as directed, folic acid 1 mg daily, Lasix 20 mg daily, Amaryl 1 mg daily in the morning, Synthroid 88 mcg daily, lisinopril 2.5 mg daily, multivitamin 1 tablet daily,  Nitrostat 0.4 mg sublingual as needed for chest pain, Januvia 50 mg daily. FAMILY HISTORY:  This was reviewed. Mother had diabetes and stroke. Father had stomach cancer. PAST SURGICAL HISTORY:  This is significant for colectomy, hysterectomy. SOCIAL HISTORY:  No history of alcohol or tobacco abuse. REVIEW OF SYSTEMS:    HEAD, EYES, EARS, NOSE, AND THROAT:  No headache, no dizziness, no blurring of vision, no photophobia. RESPIRATORY SYSTEM:  This is positive for shortness of breath. No cough, no hemoptysis. CARDIOVASCULAR SYSTEM:  This is positive for orthopnea. No chest pain, no palpitations. GASTROINTESTINAL SYSTEM:  This is positive for nausea. No vomiting, no diarrhea, no constipation. GENITOURINARY SYSTEM:  No dysuria, no urgency, and no frequency. All other systems are reviewed and they are negative. PHYSICAL EXAMINATION:    GENERAL APPEARANCE:  The patient appeared ill, in moderate distress.   VITAL SIGNS:  On arrival at the emergency room, temperature 98.6, pulse 103, respiratory rate 22, blood pressure 155/75, oxygen saturation 93% on 2 liters of oxygen. HEAD:  Normocephalic, atraumatic. EYES:  Normal eye movement. No redness, no drainage, no discharge. EARS:  Normal external ears with no obvious drainage. NOSE:  No deformity, no drainage. MOUTH AND THROAT:  No visible oral lesion. NECK:  Supple. Mild JVD. No thyromegaly. CHEST:  Bilateral basilar crackles. Few expiratory wheezes. HEART:  Normal S1 and S2, regular. No clinically appreciable murmur. ABDOMEN:  Soft, nontender. Normal bowel sounds. CENTRAL NERVOUS SYSTEM:  Alert. Not oriented. No gross focal neurological deficits. EXTREMITIES:  Edema 2+, pulses 2+ bilaterally. MUSCULOSKELETAL SYSTEM:  No obvious joint deformity and swelling. SKIN:  No active skin lesion seen in the exposed part of the body. PSYCHIATRIC:  Unable to assess mood and affect. LYMPHATIC SYSTEM:  No cervical lymphadenopathy. DIAGNOSTIC DATA:  EKG shows sinus tachycardia and nonspecific ST and T-wave abnormalities suggestive of ischemia. Chest x-ray shows increased mild-to-moderate pulmonary edema, unchanged left pleural effusion with left basilar atelectasis/airspace disease. LABORATORY DATA:  Chemistry:  Sodium 133, potassium 4.6, chloride 99, CO2 26, glucose 326, BUN 33, creatinine 1.14, calcium 9.4, bilirubin total 0.3, ALT 22, AST 21, alkaline phosphatase 668, total protein at 7.6, albumin level 2.5, globulin at 5.1. Cardiac profile:  Troponin 0.16. Lactic acid level 1.2. Hematology:  WBC 9.3, hemoglobin at 9.6, hematocrit at 31.0, platelet at 993. ASSESSMENT:      1.  Non-ST elevation myocardial infarction. 2.  Acute on chronic systolic congestive heart failure. 3.  Metastatic left breast cancer. 4.  Hypertension. 5.  Type 2 diabetes with hyperglycemia. 6.  Hypothyroidism. 7.  Rheumatoid arthritis. 8.  Dyslipidemia. 9.  Chronic kidney disease stage III. 10.  Anemia. PLAN:  1.  Non-ST elevation myocardial infarction. Will admit the patient for further evaluation and treatment. This is a patient with established coronary artery disease. Will continue with the aspirin and Plavix. Will start the patient on full-dose Lovenox. A cardiology consult will be requested to assist in evaluation and treatment of a non-ST elevation myocardial infarction. 2.  Acute on chronic systolic congestive heart failure. Will start the patient on Lasix. Will await further recommendation from the cardiologist.    3.  Metastatic left breast cancer. Will continue with her preadmission medications. The patient was seen by oncologist during the most recent hospitalization. 4.  Hypertension. Will resume her home medications and monitor the patient's blood pressure closely. 5.  Type 2 diabetes with hyperglycemia. Will start the patient on sliding scale with insulin coverage. Will hold the oral agents until the time of discharge from the hospital.    6.  Hypothyroidism. Will continue with the Synthroid. Will check a TSH level. 7.  Rheumatoid arthritis. Dai Medellin resume her home medications. 8.  Dyslipidemia. Will continue with her preadmission medications. 9.  Chronic kidney disease stage III. Will monitor the patient's renal function closely. 10.  Anemia. This is most likely due to the chronic kidney disease. Will monitor the patient's hemoglobin and hematocrit closely. OTHER ISSUES:  CODE STATUS:  DNR. PATIENT IS A DO NOT RESUSCITATE. The patient has been started on full heparin for treatment of non-ST elevation myocardial infarction. Because of that, there is no need for deep vein thrombosis prophylaxis with her Lovenox.       Son Pepe MD       RE/  D: 06/12/2018 06:07     T: 06/12/2018 07:13  JOB #: [id number]  CC: Crystal Olivares MD

## 2018-06-12 NOTE — ED TRIAGE NOTES
Patient is a patient at [facility]. She has shortness of breath and difficulty breathing. RA sats 87%. She has had 4 MI since April all presented as SOB.

## 2018-06-12 NOTE — ROUTINE PROCESS
TRANSFER - OUT REPORT:    Verbal report given to RN(name) on Norberto Lozada  being transferred to [facility](unit) for routine progression of care       Report consisted of patients Situation, Background, Assessment and   Recommendations(SBAR). Information from the following report(s) SBAR, ED Summary, MAR, Recent Results and Cardiac Rhythm NSR was reviewed with the receiving nurse. Lines:       Opportunity for questions and clarification was provided.       Patient transported with:   Monitor  O2 @ 2 liters  Registered Nurse

## 2018-06-12 NOTE — PROGRESS NOTES
Reason for Readmission:     NSTEMI         RRAT Score and Risk Level:     67     Level of Readmission:    3      Care Conference scheduled:   Admitted from [address] [place], [place] [address]ab- will request palliative care consult       Resources/supports as identified by patient/family:   Son and private duty nurse       Top Challenges facing patient (as identified by patient/family and CM): Finances/Medication cost?     no  Transportation     Son or aide   Support system or lack thereof? Son and aide  Living arrangements? Lives with son  Self-care/ADLs/Cognition? A and O x4/ patient able to make own decisions - son is very involved       Current Advanced Directive/Advance Care Plan: On file           Plan for utilizing home health:   Patient would like to return to [facility] - referral made via [facility] to [facility]- will like need PT/OT reconsulted r/t possible need for re-authorization             Likelihood of additional readmission:   moderate             Transition of Care Plan:    Based on readmission, the patient's previous Plan of Care   has been evaluated and/or modified. The current Transition of Care Plan is:       Return to [facility] for rehab - request for palliative care consult r/t high risk and hospital readmissions    Care Management Interventions  PCP Verified by CM: Yes  Transition of Care Consult (CM Consult):  Other (from [address] [place], [place] [address])  MyChart Signup: No  Discharge Durable Medical Equipment: No  Physical Therapy Consult: No  Occupational Therapy Consult: No  Speech Therapy Consult: No  Current Support Network: Relative's Home (Prior to initial hospitalization patient was living with son with a 24/7 private duty aide)  Confirm Follow Up Transport: Family  Plan discussed with Pt/Family/Caregiver: Yes  Discharge Location  Discharge Placement: Rehab Unit Subacute

## 2018-06-12 NOTE — PROGRESS NOTES
0530: TRANSFER - IN REPORT:    Verbal report received from Denise Quinonez RN(name) on Joesph Espana  being received from ED(unit) for routine progression of care      Report consisted of patients Situation, Background, Assessment and   Recommendations(SBAR). Information from the following report(s) SBAR, Kardex, Procedure Summary, MAR, Accordion, Recent Results, Med Rec Status and Cardiac Rhythm NSR was reviewed with the receiving nurse. Opportunity for questions and clarification was provided. Assessment completed upon patients arrival to unit and care assumed.      Primary Nurse Makenna Peñaloza, RN and Aracelis Johnson RN, RN performed a dual skin assessment on this patient Impairment noted- see wound doc flow sheet  Calderon score is 16

## 2018-06-12 NOTE — PROGRESS NOTES
1930: Bedside shift change report given to Haylie LANE (oncoming nurse) by Justin (offgoing nurse). Report included the following information SBAR, Kardex, Procedure Summary, Intake/Output, MAR and Recent Results. 1200: S/w Dr. Palma Dietrich regarding pt home meds verified by pharm. Meds added to [facility]     1215: aPTT results 58. 6. No rate change. 1930: Bedside shift change report given to [address] [place] [address],[address] (oncoming nurse) by Brady Khoury RN (offgoing nurse). Report included the following information SBAR, Kardex, Procedure Summary, Intake/Output, MAR and Recent Results. Problem: Falls - Risk of  Goal: *Absence of Falls  Document Yaron Fall Risk and appropriate interventions in the flowsheet. Outcome: Progressing Towards Goal  Fall Risk Interventions:  Mobility Interventions: Communicate number of staff needed for ambulation/transfer, PT Consult for mobility concerns, PT Consult for assist device competence, Strengthening exercises (ROM-active/passive)         Medication Interventions: Assess postural VS orthostatic hypotension, Patient to call before getting OOB, Teach patient to arise slowly    Elimination Interventions: Call light in reach, Toilet paper/wipes in reach, Toileting schedule/hourly rounds             Problem: Pressure Injury - Risk of  Goal: *Prevention of pressure injury  Document Calderon Scale and appropriate interventions in the flowsheet. Outcome: Progressing Towards Goal  Pressure Injury Interventions:  Sensory Interventions: Assess changes in LOC, Keep linens dry and wrinkle-free, Turn and reposition approx.  every two hours (pillows and wedges if needed)    Moisture Interventions: Check for incontinence Q2 hours and as needed, Contain wound drainage, Limit adult briefs    Activity Interventions: Chair cushion, Increase time out of bed, PT/OT evaluation    Mobility Interventions: Chair cushion, PT/OT evaluation, Suspension boots    Nutrition Interventions: Document food/fluid/supplement intake, Discuss nutritional consult with provider, Offer support with meals,snacks and hydration    Friction and Shear Interventions: Minimize layers, Lift sheet               Problem: Unstable angina/NSTEMI: Day of Admission/Day 1  Goal: Respiratory  Outcome: Progressing Towards Goal  Pt on 2L NC sating in 90s. Will monitor. Goal: Treatments/Interventions/Procedures  Outcome: Progressing Towards Goal  Pt new rx of imdur. Will monitor tolerance.

## 2018-06-12 NOTE — ED PROVIDER NOTES
Patient is a 82 y.o. female presenting with respiratory distress syndrome. Respiratory Distress   The problem has been gradually worsening. Associated symptoms include abdominal pain. Pertinent negatives include no fever, no headaches, no cough, no vomiting, no rash and no leg swelling. 82year old female with metastatic breast cancer, severe 3V CAD not surgical candidate, presents with increased SOB. States she ate dinner tonight and developed some epigastric discomfort and difficulty breathing. Nausea. Diaphoresis. thought it was her stomach and it would pass but her SOB got worse so she presents here. Not on oxygen normally. Feels better with supplemental ox here. No fevers. Denies cp. Feels weak. No fevers or URI symptoms. Received Zofran in route and no longer has nausea. Urinating ok. No leg swelling.      Past Medical History:   Diagnosis Date    Anemia 2009    Colon cancer ([facility] 2009    surgery/chemo    Colon cancer ([facility] [facility] 2009    DM (diabetes mellitus) ([facility] [facility] 2009    GERD (gastroesophageal reflux disease)     H/O: CVA 2009    slight l sided weakness    Hypothyroid 2009    Ill-defined condition     seasonal allergies    Microalbuminuria 2009    Osteoporosis 2009    Other and unspecified hyperlipidemia 2010    Rheumatoid arthritis involving ankle ([facility] [facility] 2016    Rheumatoid arthritis(714.0) 2009    Unspecified essential hypertension 2009    Weakness due to cerebrovascular accident        Past Surgical History:   Procedure Laterality Date    HX COLECTOMY      HX HYSTERECTOMY      HX OTHER SURGICAL      colonoscopies numerous since          Family History:   Problem Relation Age of Onset    Diabetes Mother     Stroke Mother     Diabetes Sister     Other Sister      fell and hit her head -  of this   Aydin Meyere Diabetes Brother     Cancer Father      stomach    Diabetes Sister        Social History     Social History    Marital status:      Spouse name: N/A    Number of children: N/A    Years of education: N/A     Occupational History    Not on file. Social History Main Topics    Smoking status: Never Smoker    Smokeless tobacco: Never Used    Alcohol use No    Drug use: No    Sexual activity: Not Currently     Partners: Male     Other Topics Concern    Not on file     Social History Narrative         ALLERGIES: Statins-hmg-coa reductase inhibitors and Sulfa (sulfonamide antibiotics)    Review of Systems   Constitutional: Negative for fever. HENT: Negative for congestion. Eyes: Negative for visual disturbance. Respiratory: Positive for shortness of breath. Negative for cough. Cardiovascular: Negative for palpitations and leg swelling. Gastrointestinal: Positive for abdominal pain and nausea. Negative for vomiting. Endocrine: Negative for polyuria. Genitourinary: Negative for dysuria. Musculoskeletal: Negative for gait problem. Skin: Negative for rash. Neurological: Negative for headaches. Psychiatric/Behavioral: Negative for dysphoric mood. Vitals:    06/12/18 0045 06/12/18 0100 06/12/18 0115 06/12/18 0130   BP: 150/76 138/70 (!) 133/98 135/52   Pulse: (!) 106 (!) 108 (!) 106 94   Resp:  23 21 21   SpO2: 97% 97% 98% 98%            Physical Exam   Constitutional: She is oriented to person, place, and time. She appears well-developed and well-nourished. No distress. HENT:   Head: Normocephalic and atraumatic. Mouth/Throat: Oropharynx is clear and moist. No oropharyngeal exudate. Eyes: Conjunctivae and EOM are normal. Pupils are equal, round, and reactive to light. Right eye exhibits no discharge. Left eye exhibits no discharge. No scleral icterus. Neck: Normal range of motion. Neck supple. No JVD present. Cardiovascular: Normal rate, regular rhythm, normal heart sounds and intact distal pulses. Exam reveals no gallop and no friction rub. No murmur heard.   Pulmonary/Chest: Effort normal. No stridor. No respiratory distress. She has no wheezes. She has rales (bilateral). She exhibits no tenderness. Abdominal: Soft. Bowel sounds are normal. She exhibits no distension and no mass. There is no tenderness. There is no rebound and no guarding. Musculoskeletal: Normal range of motion. She exhibits no edema or tenderness. Neurological: She is alert and oriented to person, place, and time. She has normal reflexes. No cranial nerve deficit. She exhibits normal muscle tone. Coordination normal.   Skin: Skin is warm and dry. No rash noted. No erythema. Psychiatric: She has a normal mood and affect. Her behavior is normal. Judgment and thought content normal.        [facility]      ED Course       Procedures         ED EKG interpretation:  Rhythm: sinus tachycardia; and regular . Rate (approx.): 110; Axis: normal; P wave: normal; QRS interval: normal ; ST/T wave: non-specific changes; unchanged from EKG 6/3/18. This EKG was interpreted by Claude Castilla, MD,ED Provider. CXR shows increased pulmonary edema. Will give 40mg lasix and admit.

## 2018-06-12 NOTE — ED NOTES
Assumed care of patient at this time. She is clearly having some difficulty breathing at this time. Wet lung sounds throughout with diminished bases. Patient has a strong history of MI that present this way. Currently she is on 2L NC with SpO2 in the upper 90s. Will continue to monitor closely at this time. CM x 3. Call bell within reach of patient.

## 2018-06-12 NOTE — ED NOTES
Ruchi Jenkins, son, [phone], would like to be contacted as needed, he is to step out and will return in 1hr

## 2018-06-12 NOTE — PROGRESS NOTES
Problem: Discharge Planning  Goal: *Discharge to safe environment  Outcome: Progressing Towards Goal  See cm notes.  JOSE Terry

## 2018-06-12 NOTE — CONSULTS
Cardiology Consult Note      Patient Name: Darrick England  : 1935 MRN: [id number]  Date: 2018  Time: 9:08 AM    Admit Diagnosis: NSTEMI (non-ST elevated myocardial infarction) [facility])    Primary Cardiologist: Dr. Lyndsay Luna MD    Consulting Cardiologist: Magda Collier. Jameson Alcala M.D.     Reason for Consult: NSTEMI    Requesting MD:  Dr. Rick Lemus MD    HPI:  Darrick England is a 82 y.o. female admitted on 2018  for NSTEMI (non-ST elevated myocardial infarction) ([facility] [address] Has PMH of severe multivessel disease not found to be candidate for PCI or CABG, systolic CHF, CKD, HTN, metastatic breast cancer, colon cancer. Has had 4 MI's since April this year with each episode presenting with SOB. SOB started after eating dinner. Had nausea, diaphoresis, thought it was her stomach but SOB got worse. Also developed chest pressure. Last hospitalization -18 for NSTEMI. Has since been at [facility] rehab. Developed SOB early this a.m. And some chest pressure- no alleviating factors but does state she was given 2 nitro SL and feels this helped her symptoms a little. [facility] then called EMS and transferred her to [facility]. Troponin 0.16 (was 1.44 last admission). CXR showed increased to mod pulmonary edema with unchanged left pleural effusion. She received IV lasix and supplemental O2. Felt better after O2 and chest pain had resolved by time of ER arrival.     Subjective:  Currently denies chest pressure. SOB is much improved. States she was starting to feel better at rehab and wasn't sure why they transferred her to [facility]. No dizziness, palpitations. No abdominal pain or nausea currently. C/o Rt lower back pain which she states a physician at [facility] started Lidocaine patch which helps. Assessment and Plan      1.  Elevated troponin/CAD: severe multivessel disease  -Troponin 0.16 (less than last admission- was 1.44)  -12 lead EKG:  ST, lateral ST/T wave abnormality, not significantly changed from 6/3  -Chest pain resolved   -Not candidate for CABG or intervention.  -Continue ASA, plavix, BB, low dose ace-Iand repatha (if family able to bring in). No statin due to intolerance in past.  -Will add Imdur 30 mg daily      2. Acute on chronic systolic CHF: NYHA class IV on admission.  - 6/1/18 echoEF  30%. NWMA. (reduced from EF 40% on [facility] 5/21/18)  -Continue Coreg 6.25 mg BID. -daily I/Os and weights- try to obtain standing weights. Weight less than at discharge last admission.    -Will decrease lasix to 40 mg IV daily.  -Aldactone stopped last admission due to hyperkalemia     3. CKD: Stage III. Creatinine improved. 1.14 GFR 46  -Follow renal function closely     4. Hx of HTN:  BP controlled  -Continue coreg, lisinopril    5. Elevated alk phos (668)     6. Anemia:  Chronic. Hgb 9.6 from 9.3    7.. Hx of metastatic breast cancer (current) and colon ca. -Per heme/onc    8. Hx of HLD:  Restart repatha. Statin intolerant. 9. DNR.: has been seen by palliative care last admission. Pt with severe multivessel CAD and systolic CHF. Unfortunately she is not a candidate for CABG or PCI. Troponin mildly elevated (0.16) but was 1.44 last admission. Will add Imdur 30 mg daily for symptom management. Will decrease Lasix to 40 mg daily. Dr. Samy Dc to follow in a.m. Saw and evaluated pt and agree with above assessment and plan. Focus on optimizing medical management. Will add imdur. Dr. Samy Dc to follow. Zo Hines MD    Review of Symptoms:  Constitutional: Negative for fever, chills, weight loss,. HEENT: Negative for nosebleeds, vision changes. Respiratory: Negative for cough, hemoptysis  Cardiovascular: Negative for chest pain now, negative palpitations, leg swelling, syncope, and PND. Gastrointestinal: Negative for nausea, vomiting, diarrhea, blood in stool and melena, abdominal pain   Genitourinary: Negative for dysuria, and hematuria. Musculoskeletal: + Right lower back pain. Skin: Negative for rash. Heme: Does not bleed or bruise easily.    Neurological: Negative for speech changes and focal weakness      Previous treatment/evaluation includes echocardiogram, cardiac catheterization and stress test .  Cardiac risk factors: dyslipidemia, diabetes mellitus, sedentary life style, hypertension, post-menopausal.    Past Medical History:   Diagnosis Date    Anemia 9/2/2009    Colon cancer ([facility] [address] 9/2/2009    surgery/chemo    [facility] 9/[facility]/2009    DM (diabetes mellitus) [facility] 9/2/2009    GERD (gastroesophageal reflux disease)     H/O: CVA 9/2/2009    slight l sided weakness    Hypothyroid 9/2/2009    Ill-defined condition     seasonal allergies    Microalbuminuria 9/2/2009    Osteoporosis 9/2/2009    Other and unspecified hyperlipidemia 1/27/2010    Rheumatoid arthritis involving ankle [facility] 9/28/2016    Rheumatoid arthritis(714.0) 9/2/2009    Unspecified essential hypertension 9/2/2009    Weakness due to cerebrovascular accident      Past Surgical History:   Procedure Laterality Date    HX COLECTOMY      HX HYSTERECTOMY      HX OTHER SURGICAL      colonoscopies numerous since 1993     Current Facility-Administered Medications   Medication Dose Route Frequency    insulin lispro (HUMALOG) injection   SubCUTAneous AC&HS    glucose chewable tablet 16 g  4 Tab Oral PRN    dextrose (D50W) injection syrg 12.5-25 g  12.5-25 g IntraVENous PRN    glucagon (GLUCAGEN) injection 1 mg  1 mg IntraMUSCular PRN    heparin 25,000 units in D5W 250 ml infusion  12-25 Units/kg/hr IntraVENous TITRATE    anastrozole (ARIMIDEX) tablet 1 mg  1 mg Oral DAILY    ascorbic acid (vitamin C) (VITAMIN C) tablet 1,000 mg  1,000 mg Oral DAILY    aspirin delayed-release tablet 81 mg  81 mg Oral DAILY    carvedilol (COREG) tablet 6.25 mg  6.25 mg Oral BID WITH MEALS    cholecalciferol (VITAMIN D3) tablet 1,000 Units  1,000 Units Oral DAILY    clopidogrel (PLAVIX) tablet 75 mg  75 mg Oral DAILY    docusate sodium (COLACE) capsule 100 mg  100 mg Oral BID    [START ON 2018] epoetin celio (EPOGEN;PROCRIT) injection 10,000 Units  10,000 Units SubCUTAneous Q MON, WED & FRI    . PHARMACY TO SUBSTITUTE PER PROTOCOL    Per Protocol    . PHARMACY TO SUBSTITUTE PER PROTOCOL    Per Protocol    folic acid (FOLVITE) tablet 1 mg  1 mg Oral DAILY    . PHARMACY TO SUBSTITUTE PER PROTOCOL    Per Protocol    lactobac ac& pc-s.therm-b.anim (BERYL Q/RISAQUAD)  1 Cap Oral DAILY    levothyroxine (SYNTHROID) tablet 88 mcg  88 mcg Oral ACB    lisinopril (PRINIVIL, ZESTRIL) tablet 2.5 mg  2.5 mg Oral DAILY    nitroglycerin (NITROSTAT) tablet 0.4 mg  0.4 mg SubLINGual PRN    sodium chloride (NS) flush 5-10 mL  5-10 mL IntraVENous Q8H    sodium chloride (NS) flush 5-10 mL  5-10 mL IntraVENous PRN    acetaminophen (TYLENOL) tablet 650 mg  650 mg Oral Q4H PRN    ondansetron (ZOFRAN) injection 4 mg  4 mg IntraVENous Q4H PRN    furosemide (LASIX) injection 40 mg  40 mg IntraVENous Q12H       Allergies   Allergen Reactions    Statins-Hmg-Coa Reductase Inhibitors Other (comments)     Intolerant to statins     Sulfa (Sulfonamide Antibiotics) Other (comments)     syncope      Family History   Problem Relation Age of Onset    Diabetes Mother     Stroke Mother     Diabetes Sister     Other Sister      fell and hit her head -  of this   [facility] Diabetes Brother     Cancer Father      stomach    Diabetes Sister       Social History     Social History    Marital status:      Spouse name: N/A    Number of children: N/A    Years of education: N/A     Social History Main Topics    Smoking status: Never Smoker    Smokeless tobacco: Never Used    Alcohol use No    Drug use: No    Sexual activity: Not Currently     Partners: Male     Other Topics Concern    Not on file     Social History Narrative       Objective:    Physical Exam    Vitals:   Vitals:    18 0400 06/12/18 0430 06/12/18 0537 06/12/18 0812   BP: 127/66 139/67 138/71 117/55   Pulse: 94 88 87 82   Resp: 21 17 18 16   Temp:   98.2 °F (36.8 °C) 98.2 °F (36.8 °C)   SpO2: 99% 98% 100% 100%   Weight:   54.6 kg (120 lb 5.9 oz)    Height:           General:    Alert, cooperative, no distress, appears stated age. Neck:   Supple, no JVD. Back:     Symmetric,    Lungs:     Few Fine crackles right base, left base diminished. Heart[de-identified]    Regular rate and rhythm, S1, S2 normal, no murmur, click, rub or gallop. Abdomen:     Soft, non-tender. nondistended. Extremities:   Extremities normal, atraumatic, no cyanosis or edema. Vascular:   Pulses - 2+ bilaterally   Skin:   Skin color normal. No rashes or lesions   Neurologic:   Alert, MUJICA. Telemetry: NSR    ECG: NSR    Data Review:     Radiology: CXR read reviewed    Recent Labs      06/12/18   0045   TROIQ  0.16*     Recent Labs      06/12/18   0045   NA  133*   K  4.6   CL  99   CO2  26   BUN  33*   CREA  1.14*   GLU  326*   CA  9.4     Recent Labs      06/12/18   0045   WBC  9.3   HGB  9.6*   HCT  31.0*   PLT  306     Recent Labs      06/12/18   0045   SGOT  21   AP  668*     No results for input(s): CHOL, LDLC in the last 72 hours. No lab exists for component: TGL, HDLC,  HBA1C  No results for input(s): CRP, TSH, TSHEXT in the last 72 hours. No lab exists for component: ESR    Thank you very much for this referral. I appreciate the opportunity to participate in this patient's care. I will follow along with above stated plan. Cailin Stock.  MARIETTA [place]         [facility] [address] [place] [address] Rd[address] [facility] [address] [facility] [address]     [facility]     [phone]    Eugene Councilman, MD

## 2018-06-13 LAB
ALBUMIN SERPL-MCNC: 2.3 G/DL (ref 3.5–5)
ALBUMIN/GLOB SERPL: 0.5 {RATIO} (ref 1.1–2.2)
ALP SERPL-CCNC: 602 U/L (ref 45–117)
ALT SERPL-CCNC: 19 U/L (ref 12–78)
ANION GAP SERPL CALC-SCNC: 9 MMOL/L (ref 5–15)
APTT PPP: 56.7 SEC (ref 22.1–32)
AST SERPL-CCNC: 23 U/L (ref 15–37)
BASOPHILS # BLD: 0.1 K/UL (ref 0–0.1)
BASOPHILS NFR BLD: 1 % (ref 0–1)
BILIRUB SERPL-MCNC: 0.3 MG/DL (ref 0.2–1)
BUN SERPL-MCNC: 45 MG/DL (ref 6–20)
BUN/CREAT SERPL: 38 (ref 12–20)
CALCIUM SERPL-MCNC: 8.1 MG/DL (ref 8.5–10.1)
CHLORIDE SERPL-SCNC: 102 MMOL/L (ref 97–108)
CO2 SERPL-SCNC: 26 MMOL/L (ref 21–32)
CREAT SERPL-MCNC: 1.17 MG/DL (ref 0.55–1.02)
DIFFERENTIAL METHOD BLD: ABNORMAL
EOSINOPHIL # BLD: 0.2 K/UL (ref 0–0.4)
EOSINOPHIL NFR BLD: 3 % (ref 0–7)
ERYTHROCYTE [DISTWIDTH] IN BLOOD BY AUTOMATED COUNT: 18.9 % (ref 11.5–14.5)
GLOBULIN SER CALC-MCNC: 4.5 G/DL (ref 2–4)
GLUCOSE BLD STRIP.AUTO-MCNC: 200 MG/DL (ref 65–100)
GLUCOSE BLD STRIP.AUTO-MCNC: 265 MG/DL (ref 65–100)
GLUCOSE BLD STRIP.AUTO-MCNC: 287 MG/DL (ref 65–100)
GLUCOSE BLD STRIP.AUTO-MCNC: 288 MG/DL (ref 65–100)
GLUCOSE SERPL-MCNC: 180 MG/DL (ref 65–100)
HCT VFR BLD AUTO: 26.4 % (ref 35–47)
HGB BLD-MCNC: 8.2 G/DL (ref 11.5–16)
IMM GRANULOCYTES # BLD: 0 K/UL (ref 0–0.04)
IMM GRANULOCYTES NFR BLD AUTO: 0 % (ref 0–0.5)
LYMPHOCYTES # BLD: 1.8 K/UL (ref 0.8–3.5)
LYMPHOCYTES NFR BLD: 27 % (ref 12–49)
MCH RBC QN AUTO: 27.4 PG (ref 26–34)
MCHC RBC AUTO-ENTMCNC: 31.1 G/DL (ref 30–36.5)
MCV RBC AUTO: 88.3 FL (ref 80–99)
MONOCYTES # BLD: 1 K/UL (ref 0–1)
MONOCYTES NFR BLD: 15 % (ref 5–13)
NEUTS SEG # BLD: 3.5 K/UL (ref 1.8–8)
NEUTS SEG NFR BLD: 54 % (ref 32–75)
NRBC # BLD: 0 K/UL (ref 0–0.01)
NRBC BLD-RTO: 0 PER 100 WBC
PLATELET # BLD AUTO: 277 K/UL (ref 150–400)
PLATELET COMMENTS,PCOM: ABNORMAL
PMV BLD AUTO: 9.8 FL (ref 8.9–12.9)
POTASSIUM SERPL-SCNC: 4.7 MMOL/L (ref 3.5–5.1)
PROT SERPL-MCNC: 6.8 G/DL (ref 6.4–8.2)
RBC # BLD AUTO: 2.99 M/UL (ref 3.8–5.2)
RBC MORPH BLD: ABNORMAL
RBC MORPH BLD: ABNORMAL
SERVICE CMNT-IMP: ABNORMAL
SODIUM SERPL-SCNC: 137 MMOL/L (ref 136–145)
THERAPEUTIC RANGE,PTTT: ABNORMAL SECS (ref 58–77)
WBC # BLD AUTO: 6.6 K/UL (ref 3.6–11)

## 2018-06-13 PROCEDURE — 85025 COMPLETE CBC W/AUTO DIFF WBC: CPT | Performed by: INTERNAL MEDICINE

## 2018-06-13 PROCEDURE — 74011636637 HC RX REV CODE- 636/637: Performed by: INTERNAL MEDICINE

## 2018-06-13 PROCEDURE — 77010033678 HC OXYGEN DAILY

## 2018-06-13 PROCEDURE — 80053 COMPREHEN METABOLIC PANEL: CPT | Performed by: INTERNAL MEDICINE

## 2018-06-13 PROCEDURE — 82962 GLUCOSE BLOOD TEST: CPT

## 2018-06-13 PROCEDURE — 65270000032 HC RM SEMIPRIVATE

## 2018-06-13 PROCEDURE — 74011250637 HC RX REV CODE- 250/637: Performed by: NURSE PRACTITIONER

## 2018-06-13 PROCEDURE — 74011250636 HC RX REV CODE- 250/636: Performed by: INTERNAL MEDICINE

## 2018-06-13 PROCEDURE — 97535 SELF CARE MNGMENT TRAINING: CPT

## 2018-06-13 PROCEDURE — 85730 THROMBOPLASTIN TIME PARTIAL: CPT | Performed by: INTERNAL MEDICINE

## 2018-06-13 PROCEDURE — 77030011256 HC DRSG MEPILEX <16IN NO BORD MOLN -A

## 2018-06-13 PROCEDURE — 97530 THERAPEUTIC ACTIVITIES: CPT

## 2018-06-13 PROCEDURE — 97165 OT EVAL LOW COMPLEX 30 MIN: CPT

## 2018-06-13 PROCEDURE — 74011250636 HC RX REV CODE- 250/636: Performed by: NURSE PRACTITIONER

## 2018-06-13 PROCEDURE — 74011250637 HC RX REV CODE- 250/637: Performed by: INTERNAL MEDICINE

## 2018-06-13 PROCEDURE — 36415 COLL VENOUS BLD VENIPUNCTURE: CPT | Performed by: INTERNAL MEDICINE

## 2018-06-13 RX ORDER — HEPARIN SODIUM 5000 [USP'U]/ML
5000 INJECTION, SOLUTION INTRAVENOUS; SUBCUTANEOUS EVERY 8 HOURS
Status: DISCONTINUED | OUTPATIENT
Start: 2018-06-13 | End: 2018-06-17

## 2018-06-13 RX ORDER — CARVEDILOL 12.5 MG/1
12.5 TABLET ORAL 2 TIMES DAILY WITH MEALS
Status: DISCONTINUED | OUTPATIENT
Start: 2018-06-13 | End: 2018-06-15

## 2018-06-13 RX ORDER — FUROSEMIDE 10 MG/ML
20 INJECTION INTRAMUSCULAR; INTRAVENOUS DAILY
Status: DISCONTINUED | OUTPATIENT
Start: 2018-06-13 | End: 2018-06-13

## 2018-06-13 RX ORDER — FUROSEMIDE 10 MG/ML
40 INJECTION INTRAMUSCULAR; INTRAVENOUS DAILY
Status: DISCONTINUED | OUTPATIENT
Start: 2018-06-13 | End: 2018-06-14

## 2018-06-13 RX ADMIN — FOLIC ACID 1 MG: 1 TABLET ORAL at 09:09

## 2018-06-13 RX ADMIN — CHOLECALCIFEROL TAB 10 MCG (400 UNIT) 1000 UNITS: 10 TAB at 09:08

## 2018-06-13 RX ADMIN — Medication 10 ML: at 14:32

## 2018-06-13 RX ADMIN — CLOPIDOGREL BISULFATE 75 MG: 75 TABLET ORAL at 09:09

## 2018-06-13 RX ADMIN — ERYTHROPOIETIN 10000 UNITS: 10000 INJECTION, SOLUTION INTRAVENOUS; SUBCUTANEOUS at 18:30

## 2018-06-13 RX ADMIN — INSULIN LISPRO 5 UNITS: 100 INJECTION, SOLUTION INTRAVENOUS; SUBCUTANEOUS at 18:30

## 2018-06-13 RX ADMIN — HEPARIN SODIUM 5000 UNITS: 5000 INJECTION, SOLUTION INTRAVENOUS; SUBCUTANEOUS at 22:19

## 2018-06-13 RX ADMIN — Medication 1 CAPSULE: at 09:09

## 2018-06-13 RX ADMIN — LEVOTHYROXINE SODIUM 88 MCG: 88 TABLET ORAL at 06:55

## 2018-06-13 RX ADMIN — INSULIN LISPRO 5 UNITS: 100 INJECTION, SOLUTION INTRAVENOUS; SUBCUTANEOUS at 11:47

## 2018-06-13 RX ADMIN — FUROSEMIDE 40 MG: 10 INJECTION, SOLUTION INTRAMUSCULAR; INTRAVENOUS at 10:26

## 2018-06-13 RX ADMIN — DOCUSATE SODIUM 100 MG: 100 CAPSULE, LIQUID FILLED ORAL at 18:30

## 2018-06-13 RX ADMIN — Medication 10 ML: at 22:21

## 2018-06-13 RX ADMIN — Medication 10 ML: at 06:55

## 2018-06-13 RX ADMIN — CARVEDILOL 12.5 MG: 12.5 TABLET, FILM COATED ORAL at 10:26

## 2018-06-13 RX ADMIN — ASPIRIN 81 MG: 81 TABLET, COATED ORAL at 09:08

## 2018-06-13 RX ADMIN — HEPARIN SODIUM 5000 UNITS: 5000 INJECTION, SOLUTION INTRAVENOUS; SUBCUTANEOUS at 14:32

## 2018-06-13 RX ADMIN — OXYCODONE HYDROCHLORIDE AND ACETAMINOPHEN 1000 MG: 500 TABLET ORAL at 09:08

## 2018-06-13 RX ADMIN — Medication 1 TABLET: at 09:19

## 2018-06-13 RX ADMIN — INSULIN LISPRO 3 UNITS: 100 INJECTION, SOLUTION INTRAVENOUS; SUBCUTANEOUS at 08:06

## 2018-06-13 RX ADMIN — CARVEDILOL 12.5 MG: 12.5 TABLET, FILM COATED ORAL at 18:30

## 2018-06-13 RX ADMIN — ANASTROZOLE 1 MG: 1 TABLET, COATED ORAL at 09:18

## 2018-06-13 RX ADMIN — DOCUSATE SODIUM 100 MG: 100 CAPSULE, LIQUID FILLED ORAL at 09:09

## 2018-06-13 RX ADMIN — ACETAMINOPHEN 650 MG: 325 TABLET ORAL at 22:30

## 2018-06-13 RX ADMIN — LISINOPRIL 2.5 MG: 5 TABLET ORAL at 09:11

## 2018-06-13 RX ADMIN — ISOSORBIDE MONONITRATE 30 MG: 30 TABLET, EXTENDED RELEASE ORAL at 09:10

## 2018-06-13 RX ADMIN — INSULIN LISPRO 3 UNITS: 100 INJECTION, SOLUTION INTRAVENOUS; SUBCUTANEOUS at 22:19

## 2018-06-13 NOTE — PROGRESS NOTES
Spiritual Care Assessment/Progress Note  [facility]      NAME: Germaine Staley      MRN: [id number]  AGE: 82 y.o. SEX: female  Tenriism Affiliation: Adrielouti   Language: English     6/13/2018     Total Time (in minutes): 13     Spiritual Assessment begun in [facility] [facility] UNIT through conversation with:         [x]Patient        [] Family    [] Friend(s)        Reason for Consult: Initial/Spiritual assessment, patient floor     Spiritual beliefs: (Please include comment if needed)     [x] Identifies with a uzma tradition: Norma     [] Supported by a uzma community:      [] Claims no spiritual orientation:      [] Seeking spiritual identity:           [] Adheres to an individual form of spirituality:      [] Not able to assess:                     Identified resources for coping:      [] Prayer                               [] Music                  [] Guided Imagery     [x] Family/friends                 [] Pet visits     [] Devotional reading                         [] Unknown     [] Other                                             Interventions offered during this visit: (See comments for more details)    Patient Interventions: Affirmation of emotions/emotional suffering, Catharsis/review of pertinent events in supportive environment, Normalization of emotional/spiritual concerns, Initial/Spiritual assessment, patient floor, Prayer (actual), Prayer (assurance of), Tenriism beliefs/image of God discussed           Plan of Care:     Pt  [] Make referral to Music Therapy  [] Make referral to Pet Therapy     [] Make referral to Addiction services  [] Make referral to [facility]  [] Make referral to Spiritual Care Partner  [] No future visits requested        [x] Follow up visits as needed     Comments: Visited with Ms. Ledesma for initial spiritual assessment. Pt shared of events leading up to the hospital and some of the challenges she is facing.  Pt had a caregiver from home at her bedside; affirmed her present and her care. Ms. Manuel Crawford also spoke of the significant support she receives from her son, and reflected on the death of her  which occurred this past year. Pt expresses Religious uzma and appreciates prayer, which was offered at bedside. Assured her of ongoing prayers and of  availability.     Amaya Claire, Palliative

## 2018-06-13 NOTE — PROGRESS NOTES
Cardiology Progress Note            Admit Date: 6/12/2018  Admit Diagnosis: NSTEMI (non-ST elevated myocardial infarction) [facility])  Date: 6/13/2018     Time: 7:29 AM    HPI: Pt with severe multivessel CAD, multiple NSTEMI,  metastatic breast cancer, ckd. Presents 6/11/18 from [address] [place] [address] with chest pressure and SOB. Mild elevation in troponin- peak 0.16. Subjective:   Cyn Aguilar denies chest pain, pressure, palpitations, SOB. States she stood up this morning for scale weight and was not dizzy and had no chest pain or SOB. Assessment and Plan   1. Elevated troponin/CAD: severe multivessel disease  -Troponin trended down to 0.12 from 0.16 (less than last admission- was 1.44)  -12 lead EKG:  ST, lateral ST/T wave abnormality, not significantly changed from 6/3  -Chest pain resolved   -Not candidate for CABG or intervention.  -Continue ASA, plavix, BB, low dose ace-I and repatha (if family able to bring in). No statin due to intolerance in past.  -Stop heparin, worsened anemia, no longer need heparin- feel that troponin elevation likely represents demand from fluid overload. -Continue Imdur 30 mg daily for now     2. Acute on chronic systolic CHF: NYHA class IV on admission, class III today. - 6/1/18 echoEF  30%. NWMA.  (reduced from EF 40% on [facility] 5/21/18)  -Increase Continue Coreg to 12.5 mg BID BID.    -daily I/Os and weights- Weight less than at discharge last admission.    -Continue Lasix 40 mg IV daily for now  -Aldactone stopped last admission due to hyperkalemia      3. CKD: Stage III. Creatinine  1.17 from 1.14. GFR 44  -Follow renal function closely      4. Hx of HTN:  BP controlled  -Will increase coreg  -Continue low dose lisinopril     5. Elevated alk phos: trending down slightly.      6. Anemia:  worsened hgb 8.2 from 9.6 yesterday  -Stop heparin.        7. Hx of metastatic breast cancer (current) and colon ca.   -Per heme/onc    Troponin trending down. Mild troponin elevation likely from demand of fluid overload. Stop heparin. Continue lasix 40 mg IV for now. Will likely need increased po dose of Lasix (40 mg po daily)at discharge. Increase coreg dosing. Check labs in a.m. Cardiology Attending:Patient seen and examined. I agree with NP assessment and plans. Will need increased maintenance meds for CHF. Gabriel Avina MD 6/13/2018 12:29 PM       Objective:      Physical Exam:                Visit Vitals    /44 (BP 1 Location: Right arm, BP Patient Position: At rest)    Pulse 90    Temp 98.6 °F (37 °C)    Resp 18    Ht 5' 1\" (1.549 m)    Wt 55.2 kg (121 lb 11.1 oz)    SpO2 99%    BMI 22.99 kg/m2          General Appearance:   Well developed, well nourished,alert and oriented x 3, and   individual in no acute distress. Ears/Nose/Mouth/Throat:    Hearing grossly normal.         Neck:  Supple. Chest:    Lungs with bibasilar crackles to auscultation bilaterally. No use of accessory muscles. Cardiovascular:    Regular rate and rhythm, S1, S2 normal, no murmur. Abdomen:    Soft, non-tender, bowel sounds are active. Extremities:  No edema bilaterally. Skin:  Warm and dry.      Telemetry: normal sinus rhythm          Data Review:    Labs:    Recent Results (from the past 24 hour(s))   MAGNESIUM    Collection Time: 06/12/18  8:43 AM   Result Value Ref Range    Magnesium 1.8 1.6 - 2.4 mg/dL   PHOSPHORUS    Collection Time: 06/12/18  8:43 AM   Result Value Ref Range    Phosphorus 4.3 2.6 - 4.7 MG/DL   CK W/ CKMB & INDEX    Collection Time: 06/12/18  8:43 AM   Result Value Ref Range    CK 21 (L) 26 - 192 U/L    CK - MB <1.0 <3.6 NG/ML    CK-MB Index Cannot be calculated 0 - 2.5     TROPONIN I    Collection Time: 06/12/18  8:43 AM   Result Value Ref Range    Troponin-I, Qt. 0.12 (H) <0.05 ng/mL   PTT    Collection Time: 06/12/18 10:23 AM   Result Value Ref Range    aPTT 58.6 (H) 22.1 - 32.0 sec    aPTT, therapeutic range     58.0 - 77.0 SECS   NT-PRO BNP    Collection Time: 06/12/18 10:23 AM   Result Value Ref Range    NT pro-BNP 97331 (H) 0 - 450 PG/ML   GLUCOSE, POC    Collection Time: 06/12/18 11:54 AM   Result Value Ref Range    Glucose (POC) 301 (H) 65 - 100 mg/dL    Performed by Chelsey Nguyen W/MICROSCOPIC    Collection Time: 06/12/18  2:17 PM   Result Value Ref Range    Color YELLOW/STRAW      Appearance CLOUDY (A) CLEAR      Specific gravity 1.008 1.003 - 1.030      pH (UA) 5.5 5.0 - 8.0      Protein NEGATIVE  NEG mg/dL    Glucose NEGATIVE  NEG mg/dL    Ketone NEGATIVE  NEG mg/dL    Bilirubin NEGATIVE  NEG      Blood MODERATE (A) NEG      Urobilinogen 0.2 0.2 - 1.0 EU/dL    Nitrites NEGATIVE  NEG      Leukocyte Esterase LARGE (A) NEG      WBC 20-50 0 - 4 /hpf    RBC 0-5 0 - 5 /hpf    Epithelial cells FEW FEW /lpf    Bacteria NEGATIVE  NEG /hpf    Budding yeast PRESENT (A) NEG      Yeast w/hyphae PRESENT (A) NEG     GLUCOSE, POC    Collection Time: 06/12/18  4:38 PM   Result Value Ref Range    Glucose (POC) 249 (H) 65 - 100 mg/dL    Performed by Keven Solano    PTT    Collection Time: 06/12/18  5:58 PM   Result Value Ref Range    aPTT 48.9 (H) 22.1 - 32.0 sec    aPTT, therapeutic range     58.0 - 77.0 SECS   CK W/ CKMB & INDEX    Collection Time: 06/12/18  5:58 PM   Result Value Ref Range    CK 18 (L) 26 - 192 U/L    CK - MB <1.0 <3.6 NG/ML    CK-MB Index Cannot be calculated 0 - 2.5     GLUCOSE, POC    Collection Time: 06/12/18  9:38 PM   Result Value Ref Range    Glucose (POC) 255 (H) 65 - 100 mg/dL    Performed by [facility] ANTONIETA JO(CON)    PTT    Collection Time: 06/13/18  2:00 AM   Result Value Ref Range    aPTT 56.7 (H) 22.1 - 32.0 sec    aPTT, therapeutic range     58.0 - 40.7 SECS   METABOLIC PANEL, COMPREHENSIVE    Collection Time: 06/13/18  2:00 AM   Result Value Ref Range    Sodium 137 136 - 145 mmol/L    Potassium 4.7 3.5 - 5.1 mmol/L    Chloride 102 97 - 108 mmol/L CO2 26 21 - 32 mmol/L    Anion gap 9 5 - 15 mmol/L    Glucose 180 (H) 65 - 100 mg/dL    BUN 45 (H) 6 - 20 MG/DL    Creatinine 1.17 (H) 0.55 - 1.02 MG/DL    BUN/Creatinine ratio 38 (H) 12 - 20      GFR est AA 54 (L) >60 ml/min/1.73m2    GFR est non-AA 44 (L) >60 ml/min/1.73m2    Calcium 8.1 (L) 8.5 - 10.1 MG/DL    Bilirubin, total 0.3 0.2 - 1.0 MG/DL    ALT (SGPT) 19 12 - 78 U/L    AST (SGOT) 23 15 - 37 U/L    Alk. phosphatase 602 (H) 45 - 117 U/L    Protein, total 6.8 6.4 - 8.2 g/dL    Albumin 2.3 (L) 3.5 - 5.0 g/dL    Globulin 4.5 (H) 2.0 - 4.0 g/dL    A-G Ratio 0.5 (L) 1.1 - 2.2     CBC WITH AUTOMATED DIFF    Collection Time: 06/13/18  2:00 AM   Result Value Ref Range    WBC 6.6 3.6 - 11.0 K/uL    RBC 2.99 (L) 3.80 - 5.20 M/uL    HGB 8.2 (L) 11.5 - 16.0 g/dL    HCT 26.4 (L) 35.0 - 47.0 %    MCV 88.3 80.0 - 99.0 FL    MCH 27.4 26.0 - 34.0 PG    MCHC 31.1 30.0 - 36.5 g/dL    RDW 18.9 (H) 11.5 - 14.5 %    PLATELET 729 713 - 928 K/uL    MPV 9.8 8.9 - 12.9 FL    NRBC 0.0 0  WBC    ABSOLUTE NRBC 0.00 0.00 - 0.01 K/uL    NEUTROPHILS 54 32 - 75 %    LYMPHOCYTES 27 12 - 49 %    MONOCYTES 15 (H) 5 - 13 %    EOSINOPHILS 3 0 - 7 %    BASOPHILS 1 0 - 1 %    IMMATURE GRANULOCYTES 0 0.0 - 0.5 %    ABS. NEUTROPHILS 3.5 1.8 - 8.0 K/UL    ABS. LYMPHOCYTES 1.8 0.8 - 3.5 K/UL    ABS. MONOCYTES 1.0 0.0 - 1.0 K/UL    ABS. EOSINOPHILS 0.2 0.0 - 0.4 K/UL    ABS. BASOPHILS 0.1 0.0 - 0.1 K/UL    ABS. IMM.  GRANS. 0.0 0.00 - 0.04 K/UL    DF SMEAR SCANNED      PLATELET COMMENTS Large Platelets      RBC COMMENTS MACHO CELLS  PRESENT        RBC COMMENTS ANISOCYTOSIS  1+       GLUCOSE, POC    Collection Time: 06/13/18  6:45 AM   Result Value Ref Range    Glucose (POC) 200 (H) 65 - 100 mg/dL    Performed by Sherrie Rivas           Radiology:        Current Facility-Administered Medications   Medication Dose Route Frequency    insulin lispro (HUMALOG) injection   SubCUTAneous AC&HS    glucose chewable tablet 16 g  4 Tab Oral PRN    dextrose (D50W) injection syrg 12.5-25 g  12.5-25 g IntraVENous PRN    glucagon (GLUCAGEN) injection 1 mg  1 mg IntraMUSCular PRN    anastrozole (ARIMIDEX) tablet 1 mg  1 mg Oral DAILY    ascorbic acid (vitamin C) (VITAMIN C) tablet 1,000 mg  1,000 mg Oral DAILY    aspirin delayed-release tablet 81 mg  81 mg Oral DAILY    carvedilol (COREG) tablet 6.25 mg  6.25 mg Oral BID WITH MEALS    cholecalciferol (VITAMIN D3) tablet 1,000 Units  1,000 Units Oral DAILY    clopidogrel (PLAVIX) tablet 75 mg  75 mg Oral DAILY    docusate sodium (COLACE) capsule 100 mg  100 mg Oral BID    epoetin celio (EPOGEN;PROCRIT) injection 10,000 Units  10,000 Units SubCUTAneous Q MON, WED & FRI    folic acid (FOLVITE) tablet 1 mg  1 mg Oral DAILY    lactobac ac& pc-s.therm-b.anim (BERYL Q/RISAQUAD)  1 Cap Oral DAILY    levothyroxine (SYNTHROID) tablet 88 mcg  88 mcg Oral ACB    lisinopril (PRINIVIL, ZESTRIL) tablet 2.5 mg  2.5 mg Oral DAILY    nitroglycerin (NITROSTAT) tablet 0.4 mg  0.4 mg SubLINGual PRN    sodium chloride (NS) flush 5-10 mL  5-10 mL IntraVENous Q8H    sodium chloride (NS) flush 5-10 mL  5-10 mL IntraVENous PRN    acetaminophen (TYLENOL) tablet 650 mg  650 mg Oral Q4H PRN    ondansetron (ZOFRAN) injection 4 mg  4 mg IntraVENous Q4H PRN    furosemide (LASIX) injection 40 mg  40 mg IntraVENous DAILY    isosorbide mononitrate ER (IMDUR) tablet 30 mg  30 mg Oral DAILY    [START ON 6/19/2018] evolocumab (REPATHA) syringe 140 mg (Patient Supplied)  140 mg SubCUTAneous Q 14 DAYS    teriparatide (FORTEO) injection 20 mcg (Patient Supplied)  20 mcg SubCUTAneous DAILY    palbociclib cap 125 mg (Patient Supplied)  125 mg Oral DAILY WITH LUNCH    b-complex with vitamin c tablet 1 Tab (Patient Supplied)  1 Tab Oral DAILY          Dmitriy Fire.  MARIETTA [place]     [facility] [address] [place] [address] Drive, [address] [facility] [address]   [address]   [phone]

## 2018-06-13 NOTE — CONSULTS
Palliative Medicine    Consult noted, staff has requested consult for goals of care as pt w/ mult comorbidities and mult readmissions. Speak to Dr Gui Manjarrez. We know pt and son/Sondra Kelly-  just saw 6/1/18 and goals very clear to cont current measures. Son well versed in hospice, as our team and hospice took care of his dad/pt's  not too long ago. He will let us know when he is ready for hospice services, but as of 2 weeks ago plan was to cont following w/ Dr Franco Scott. Does not feel that hospitalization is burdensome for pt or family. Dr Gui Manjarrez to talk w/ son to see if he is interested in speaking to us again. Otherwise, don't want to be burdensome. Certainly can see if Robin wishes.

## 2018-06-13 NOTE — PROGRESS NOTES
Problem: Self Care Deficits Care Plan (Adult)  Goal: *Acute Goals and Plan of Care (Insert Text)  Occupational Therapy Goals  Initiated 6/13/2018  1. Patient will perform grooming at the sink with supervision/set-up within 7 day(s). 2.  Patient will perform lower body dressing with supervision within 7 day(s). 3.  Patient will perform bathing with supervision/set-up within 7 day(s). 4.  Patient will perform toilet transfers with supervision/set-up and least restrictive DME within 7 day(s). 5.  Patient will perform all aspects of toileting with supervision/set-up within 7 day(s). 6.  Patient will participate in upper extremity therapeutic exercise/activities with supervision/set-up for 10 minutes within 7 day(s). 7.  Patient will utilize energy conservation techniques during functional activities with verbal cues within 7 day(s). Occupational Therapy EVALUATION  Patient: Prakash Rothman (82 y.o. female)  Date: 6/13/2018  Primary Diagnosis: NSTEMI (non-ST elevated myocardial infarction) [facility])        Precautions: Fall    ASSESSMENT :  Based on the objective data described below, the patient presents with overall independence for upper body ADLs, up to [address] [place] [address] for lower body ADLs, and CGA-Min A for functional mobility s/p admission for NSTEMI. PTA, patient was at inpatient rehab working with PT/OT, has been hospitalized multiple times in the past few weeks for MIs. Now presents with decreased activity tolerance, decreased functional reach to LEs, impaired pulmonary tolerance (now on O2), and generalized weakness, limiting functional independence. Able to complete bed mobility with Min A for trunk transition, ambulating brief distance in room with RW and up to [address] for turns & navigating around lines.  Considering multiple admissions, recommend patient d/c to SNF rehab vs. TBD to maximize functional independence and safety, as patient has demo'd decreased ability to tolerate 3 hours/day of therapy (son reporting patient requiring frequent rest breaks with therapies). Discussed SNF care at length with patient and son, agreeable to this d/c disposition. Patient will benefit from skilled intervention to address the above impairments. Patients rehabilitation potential is considered to be Good  Factors which may influence rehabilitation potential include:   []             None noted  []             Mental ability/status  []             Medical condition  []             Home/family situation and support systems  []             Safety awareness  []             Pain tolerance/management  []             Other:      PLAN :  Recommendations and Planned Interventions:  [x]               Self Care Training                  [x]        Therapeutic Activities  [x]               Functional Mobility Training    []        Cognitive Retraining  [x]               Therapeutic Exercises           [x]        Endurance Activities  [x]               Balance Training                   []        Neuromuscular Re-Education  []               Visual/Perceptual Training     [x]   Home Safety Training  [x]               Patient Education                 [x]        Family Training/Education  []               Other (comment):    Frequency/Duration: Patient will be followed by occupational therapy 5 times a week to address goals. Discharge Recommendations: [facility] vs. TBD  Further Equipment Recommendations for Discharge: TBD     SUBJECTIVE:   Patient stated Jameson Aguilar were working me so hard over there.     OBJECTIVE DATA SUMMARY:   HISTORY:   Past Medical History:   Diagnosis Date    Anemia 9/2/2009    Colon cancer ([facility] [address] 9/2/2009    surgery/chemo    Colon cancer ([facility] [address] 9/2/2009    DM (diabetes mellitus) ([facility] [address] 9/2/2009    GERD (gastroesophageal reflux disease)     H/O: CVA 9/2/2009    slight l sided weakness    Hypothyroid 9/2/2009    Ill-defined condition     seasonal allergies    Microalbuminuria 9/2/2009    Osteoporosis 9/2/2009    Other and unspecified hyperlipidemia 1/27/2010    Rheumatoid arthritis involving ankle ([facility] [address] 9/28/2016    Rheumatoid arthritis(714.0) 9/2/2009    Unspecified essential hypertension 9/2/2009    Weakness due to cerebrovascular accident      Past Surgical History:   Procedure Laterality Date    HX COLECTOMY      HX HYSTERECTOMY      HX OTHER SURGICAL      colonoscopies numerous since 1993       Prior Level of Function/Environment/Context: Patient has had multiple admissions in the past couple of weeks, been to [facility] and back x3, readmitted for MI. Working with OT/PT to maximize strength & functional independence in hopes of eventually returning home with caregiver assist for IADLs and some ADLs (baseline)  Home Situation  Home Environment: Rehabilitation facility  One/Two Story Residence: One story  Living Alone: No  Support Systems: Family member(s)  Patient Expects to be Discharged to[de-identified] Rehabilitation facility  Current DME Used/Available at Home: Amedeo Grave, rollator  Tub or Shower Type: Shower    Hand dominance: Right    EXAMINATION OF PERFORMANCE DEFICITS:  Cognitive/Behavioral Status:  Neurologic State: Alert  Orientation Level: Oriented X4  Cognition: Follows commands; Appropriate decision making; Appropriate for age attention/concentration; Appropriate safety awareness  Perception: Appears intact  Perseveration: No perseveration noted  Safety/Judgement: Awareness of environment; Fall prevention    Skin: Appears intact    Edema: None noted in BUEs    Hearing: Auditory  Auditory Impairment: None    Vision/Perceptual:    Tracking: Able to track stimulus in all quadrants w/o difficulty    Acuity: Within Defined Limits         Range of Motion:  In BUEs  AROM: Generally decreased, functional     Strength: In BUEs  Strength: Generally decreased, functional    Coordination:  Coordination: Generally decreased, functional  Fine Motor Skills-Upper: Left Intact; Right Intact    Gross Motor Skills-Upper: Left Intact; Right Intact    Tone & Sensation:  In BUEs  Tone: Normal  Sensation: Intact     Balance:  Sitting: Intact; Without support  Standing: Impaired; With support (RW)  Standing - Static: Good  Standing - Dynamic : Fair    Functional Mobility and Transfers for ADLs:  Bed Mobility:  Supine to Sit: Minimum assistance (HHA x1, bedrail use)  Sit to Supine: Stand-by assistance  Scooting: Stand-by assistance    Transfers:  Sit to Stand: Contact guard assistance  Stand to Sit: Contact guard assistance  Toilet Transfer : Minimum assistance (Inferred per functional mobility)    ADL Assessment:  Feeding: Modified independent*    Oral Facial Hygiene/Grooming: Supervision (Seated)*    Bathing: Minimum assistance*    Upper Body Dressing: Modified independent*    Lower Body Dressing: Minimum assistance    Toileting: Maximum assistance (Brief and purewick (able to A with brief))   *Inferred per functional mobility, functional reach, BUE ROM/strength, and activity tolerance             ADL Intervention and task modifications:     Lower Body Dressing Assistance  Dressing Assistance: Minimum assistance  Protective Undergarmet: Minimum assistance (For standing balance with pulling brief up)  Socks: Supervision/set-up  Slip on Shoes with Back: Minimum assistance (A for L heel in shoe)  Leg Crossed Method Used: Yes (Able to complete to doff socks, slip on shoes)  Position Performed: Seated edge of bed;Standing  Cues: Physical assistance;Verbal cues provided  Adaptive Equipment Used:  (RW)    Toileting  Toileting Assistance: Maximum assistance (Brief & purewick)  Bladder Hygiene: Total assistance (dependent)  Bowel Hygiene: Maximum assistance (Can A with brief management & mobility)  Clothing Management: Minimum assistance (For standing balance with pulling up brief)  Cues: Verbal cues provided; Tactile cues provided  Adaptive Equipment:  (RW)    Cognitive Retraining  Safety/Judgement: Awareness of environment; Fall prevention    Therapeutic Exercise:  - Bed mobility completed with bedrail use and HHA to transition to EOB   - Transfers with CGA-Min A with RW, increased assist required for turns & navigating around lines (O2, purewick)     Functional Measure:  Barthel Index:    Bathin  Bladder: 5  Bowels: 10  Groomin  Dressin  Feeding: 10  Mobility: 10  Stairs: 0  Toilet Use: 5  Transfer (Bed to Chair and Back): 10  Total: 60       Barthel and G-code impairment scale:  Percentage of impairment CH  0% CI  1-19% CJ  20-39% CK  40-59% CL  60-79% CM  80-99% CN  100%   Barthel Score 0-100 100 99-80 79-60 59-40 20-39 1-19   0   Barthel Score 0-20 20 17-19 13-16 9-12 5-8 1-4 0      The Barthel ADL Index: Guidelines  1. The index should be used as a record of what a patient does, not as a record of what a patient could do. 2. The main aim is to establish degree of independence from any help, physical or verbal, however minor and for whatever reason. 3. The need for supervision renders the patient not independent. 4. A patient's performance should be established using the best available evidence. Asking the patient, friends/relatives and nurses are the usual sources, but direct observation and common sense are also important. However direct testing is not needed. 5. Usually the patient's performance over the preceding 24-48 hours is important, but occasionally longer periods will be relevant. 6. Middle categories imply that the patient supplies over 50 per cent of the effort. 7. Use of aids to be independent is allowed. Kartik King., Barthel, D.W. (2736). Functional evaluation: the Barthel Index. [facility] (14)2. TIAGO Beckman, [place] [employer]., Markus Mccormick., Precious, [address] (). Measuring the change indisability after inpatient rehabilitation; comparison of the responsiveness of the Barthel Index and Functional [place] Measure.  Journal of Neurology, Neurosurgery, and Psychiatry, 66(4), [phone]. MYRA Howell, ANDREA Ramos, & Neal Membreno M.A. (2004.) Assessment of post-stroke quality of life in cost-effectiveness studies: The usefulness of the Barthel Index and the EuroQoL-5D. Quality of Life Research, 13, 933-21         G codes: In compliance with CMSs Claims Based Outcome Reporting, the following G-code set was chosen for this patient based on their primary functional limitation being treated: The outcome measure chosen to determine the severity of the functional limitation was the Barthel Index with a score of 60/100 which was correlated with the impairment scale. ? Self Care:     - CURRENT STATUS: CJ - 20%-39% impaired, limited or restricted    - GOAL STATUS: CI - 1%-19% impaired, limited or restricted    - D/C STATUS:  ---------------To be determined---------------     Occupational Therapy Evaluation Charge Determination   History Examination Decision-Making   LOW Complexity : Brief history review  LOW Complexity : 1-3 performance deficits relating to physical, cognitive , or psychosocial skils that result in activity limitations and / or participation restrictions  LOW Complexity : No comorbidities that affect functional and no verbal or physical assistance needed to complete eval tasks       Based on the above components, the patient evaluation is determined to be of the following complexity level: LOW   Pain:  Pain Scale 1: Numeric (0 - 10)  Pain Intensity 1: 2  Pain Location 1: Knee  Pain Orientation 1: Left  Pain Description 1: Aching     Activity Tolerance:   Good, on 0.5L NC with 97% sats post activity    Please refer to the flowsheet for vital signs taken during this treatment.   After treatment:   [] Patient left in no apparent distress sitting up in chair  [x] Patient left in no apparent distress in bed  [x] Call bell left within reach  [x] Nursing notified  [x] Caregiver present  [] Bed alarm activated    COMMUNICATION/EDUCATION:   The patients plan of care was discussed with: Physical Therapist and Registered Nurse. [x] Home safety education was provided and the patient/caregiver indicated understanding. [x] Patient/family have participated as able in goal setting and plan of care. [x] Patient/family agree to work toward stated goals and plan of care. [] Patient understands intent and goals of therapy, but is neutral about his/her participation. [] Patient is unable to participate in goal setting and plan of care. This patients plan of care is appropriate for delegation to [facility].     Thank you for this referral.  Jennifer Nash, OT  Time Calculation: 33 mins

## 2018-06-13 NOTE — DIABETES MGMT
DTC Progress Note    Recommendations/ Comments: Chart reviewed due to hyperglycemia. If appropriate, please consider starting home dose of Amaryl 1mg and Januvia 25 mg. Current hospital DM medication: correction scale Humalog, normal sensitivity. Chart reviewed on Sandy Ledesma. Patient is a 82 y.o. female with known diabetes on Amaryl 1 mg and Januvia 50 mg at home. A1c:   Lab Results   Component Value Date/Time    Hemoglobin A1c 7.0 (H) 04/18/2018 08:14 AM    Hemoglobin A1c 6.9 (H) 11/30/2011 08:30 AM       Recent Glucose Results: Lab Results   Component Value Date/Time     (H) 06/13/2018 02:00 AM    GLUCPOC 200 (H) 06/13/2018 06:45 AM    GLUCPOC 255 (H) 06/12/2018 09:38 PM    GLUCPOC 249 (H) 06/12/2018 04:38 PM        Lab Results   Component Value Date/Time    Creatinine 1.17 (H) 06/13/2018 02:00 AM     Estimated Creatinine Clearance: 28.5 mL/min (based on Cr of 1.17). Active Orders   Diet    DIET DIABETIC CONSISTENT CARB Regular; 2 GM NA (House Low NA)        PO intake: Patient Vitals for the past 72 hrs:   % Diet Eaten   06/12/18 1528 50 %   06/12/18 0812 50 %       Will continue to follow as needed.     Thank you  Jalen Rodriguez RD, CDE

## 2018-06-13 NOTE — PROGRESS NOTES
[facility] will not be able to accept patient to inpt rehab at this time. Marcelina with [facility] to make son aware.  notified by Autumn Zhang to call son as he made them aware he may like to consider [facility] inpt rehab and he asked for CM to contact him - CM attempted to call son and he is getting ready to go into a meeting and requests CM to call back - CM will call back later as able.   JOSE Baumann

## 2018-06-13 NOTE — PROGRESS NOTES
0730:  Bedside shift change report given to Jaycob Epstein RN (oncoming nurse) by Lynsey Lorenzana RN (offgoing nurse). Report included the following information SBAR, Kardex, Procedure Summary, Intake/Output, MAR and Recent Results. 1415:  TRANSFER - OUT REPORT:    Verbal report given to Kelsey Mack RN(name) on Flavio Carlin  being transferred to [address] [place], [place] [address]unit) for routine progression of care       Report consisted of patients Situation, Background, Assessment and   Recommendations(SBAR). Information from the following report(s) SBAR, Kardex, Procedure Summary, Intake/Output, MAR and Recent Results was reviewed with the receiving nurse. Lines:   Peripheral IV 06/12/18 Left Antecubital (Active)   Site Assessment Clean, dry, & intact 6/13/2018 11:28 AM   Phlebitis Assessment 0 6/13/2018 11:28 AM   Infiltration Assessment 0 6/13/2018 11:28 AM   Dressing Status Clean, dry, & intact 6/13/2018 11:28 AM   Dressing Type Tape;Transparent 6/13/2018 11:28 AM   Hub Color/Line Status Pink;Capped 6/13/2018 11:28 AM   Action Taken Open ports on tubing capped 6/12/2018 11:40 PM   Alcohol Cap Used Yes 6/13/2018 11:28 AM        Opportunity for questions and clarification was provided. Patient transported with:   Tech    Problem: Falls - Risk of  Goal: *Absence of Falls  Document Yaron Fall Risk and appropriate interventions in the flowsheet. Outcome: Progressing Towards Goal  Fall Risk Interventions:  Mobility Interventions: Patient to call before getting OOB         Medication Interventions: Teach patient to arise slowly, Patient to call before getting OOB    Elimination Interventions: Patient to call for help with toileting needs, Call light in reach    History of Falls Interventions: Door open when patient unattended        Problem: Pressure Injury - Risk of  Goal: *Prevention of pressure injury  Document Calderon Scale and appropriate interventions in the flowsheet.      Offload heels  Turn approximately every 2 hours or remind patient to turn   Outcome: Progressing Towards Goal  Pressure Injury Interventions:  Sensory Interventions: Assess changes in LOC, Keep linens dry and wrinkle-free, Turn and reposition approx. every two hours (pillows and wedges if needed)    Moisture Interventions: Internal/External urinary devices    Activity Interventions: Increase time out of bed    Mobility Interventions: HOB 30 degrees or less, Pressure redistribution bed/mattress (bed type)    Nutrition Interventions: Document food/fluid/supplement intake, Discuss nutritional consult with provider, Offer support with meals,snacks and hydration    Friction and Shear Interventions: Lift sheet, HOB 30 degrees or less               Problem: Unstable angina/NSTEMI: Day 2  Goal: *Hemodynamically stable  Outcome: Progressing Towards Goal  VSS:    Patient Vitals for the past 8 hrs:   Temp Pulse Resp BP SpO2   06/13/18 0757 98.5 °F (36.9 °C) 79 20 104/53 99 %   06/13/18 0310 98.6 °F (37 °C) 90 18 106/44 99 %         Goal: *Optimal pain control at patient's stated goal  Outcome: Progressing Towards Goal  Pain controlled on current regimen.

## 2018-06-13 NOTE — PROGRESS NOTES
Hospitalist Progress Note  Prem Smith MD  Answering service: [phone] OR 36 from in house phone         Date of Service:  2018  NAME:  Arthur Hdez  :  1935  MRN:  [id number]      Admission Summary:   CHIEF COMPLAINT:  Shortness of breath.       HISTORY OF PRESENT ILLNESS:  This is an 62-year-old woman with a past medical history significant for coronary artery disease, chronic systolic congestive heart failure, metastatic left breast cancer, hypertension, type 2 diabetes, hypothyroidism, dyslipidemia, rheumatoid arthritis, chronic kidney disease who was in her usual state of health until the day of her presentation at the emergency room when the patient developed shortness of breath at the rehab facility where the patient is receiving rehabilitation. The patient has had 4 myocardial infarctions since April of this year, each episode presenting with shortness of breath. The patient's son was present at [facility] where the patient is receiving rehabilitation when she developed shortness of breath. She received 3 doses of nitroglycerin, and he advised the facility to send the patient to the emergency room for further evaluation. It was reported that the patient's oxygen saturation was 87%. The patient has had multiple admissions to this hospital for evaluation and treatment of a non-ST elevation myocardial infarction. The last admission to this hospital was from 2018 to 2018. The patient was admitted and treated for non-ST elevation myocardial cardiac infarction. Since April, the patient has been having recurrent myocardial infarctions. She has had a cardiac catheterization that shows a significant lesion, but patient is not a candidate for surgical intervention or PCI/stent placement. When the patient arrived at the emergency room, she was found to have an elevated troponin level.   The patient was referred to the hospitalist service for evaluation for admission. No history of fever, no rigors and no chills. Interval history / Subjective:       6/13:  Usual breast cancer related lt lat chest pain, pt usually up walking at rehab per her report, will obtain 6 min walk 02 test.        Assessment & Plan:     NSTMI. Stable,  Elevated troponin/CAD: severe multivessel disease  -Troponin 0.16 (less than last admission- was 1.44)  -12 lead EKG:  ST, lateral ST/T wave abnormality, not significantly changed from 6/3  -Chest pain resolved   -Not candidate for CABG or intervention.  -Continue ASA, plavix, BB, low dose ace-Iand repatha (if family able to bring in). No statin due to intolerance in past.  -Will add Imdur 30 mg daily     Acute on chronic syst HF  NYHA IV EF 30 % NWMA,( reduced form 40 % on [facility] 5/21)     CKD 3  Lab Results   Component Value Date/Time    Creatinine 1.17 (H) 06/13/2018 02:00 AM      HTN, on coreg and lisinopril. Anemia, chronic   Lab Results   Component Value Date/Time    HGB 8.2 (L) 06/13/2018 02:00 AM        Metastatic breast cancer, crurent and colon cancer followed by hem/onc    Code status: DNR  DVT prophylaxis: heparin     Care Plan discussed with: Patient/Family and Nurse  Disposition: SNF/LTC and TBD discussing with cm and son( to call after pt/ot eval )     Hospital Problems  Date Reviewed: 6/12/2018          Codes Class Noted POA    * (Principal)NSTEMI (non-ST elevated myocardial infarction) ([facility] [address] ICD-10-CM: I21.4  ICD-9-CM: 410.70  6/1/2018 Yes                Review of Systems:   A comprehensive review of systems was negative. other than lt lat cp, chronic and c/c her breast cancer history. Vital Signs:    Last 24hrs VS reviewed since prior progress note.  Most recent are:  Visit Vitals    /51 (BP 1 Location: Right arm, BP Patient Position: At rest)    Pulse 88    Temp 98.7 °F (37.1 °C)    Resp 18    Ht 5' 1\" (1.549 m)    Wt 55.2 kg (121 lb 11.1 oz)    SpO2 100%    BMI 22.99 kg/m2         Intake/Output Summary (Last 24 hours) at 06/13/18 1141  Last data filed at 06/13/18 1128   Gross per 24 hour   Intake           826.48 ml   Output              600 ml   Net           226.48 ml        Physical Examination:             Constitutional:  No acute distress, cooperative, pleasant    ENT:  Oral mucous moist, oropharynx benign. Neck supple,    Resp:  CTA bilaterally. No wheezing/rhonchi/rales. No accessory muscle use   CV:  Regular rhythm, normal rate, no definite  gallops, rubs    GI:  Soft, non distended, non tender. normoactive bowel sounds, no hepatosplenomegaly     Musculoskeletal:  No edema, warm, 1+ pulses throughout    Neurologic:  Moves all extremities. AAOx3, CN II-XII reviewed     Psych:  Good insight, Not anxious nor agitated. Skin:  Good turgor, no rashes or ulcers       Data Review:    Review and/or order of clinical lab test      Labs:     Recent Labs      06/13/18   0200  06/12/18   0045   WBC  6.6  9.3   HGB  8.2*  9.6*   HCT  26.4*  31.0*   PLT  277  306     Recent Labs      06/13/18   0200  06/12/18   0843  06/12/18   0045   NA  137   --   133*   K  4.7   --   4.6   CL  102   --   99   CO2  26   --   26   BUN  45*   --   33*   CREA  1.17*   --   1.14*   GLU  180*   --   326*   CA  8.1*   --   9.4   MG   --   1.8   --    PHOS   --   4.3   --      Recent Labs      06/13/18   0200  06/12/18   0045   SGOT  23  21   ALT  19  22   AP  602*  668*   TBILI  0.3  0.3   TP  6.8  7.6   ALB  2.3*  2.5*   GLOB  4.5*  5.1*     Recent Labs      06/13/18   0200  06/12/18   1758  06/12/18   1023   APTT  56.7*  48.9*  58.6*      No results for input(s): FE, TIBC, PSAT, FERR in the last 72 hours. No results found for: FOL, RBCF   No results for input(s): PH, PCO2, PO2 in the last 72 hours.   Recent Labs      06/12/18   1758  06/12/18   0843  06/12/18   0045   CPK  18*  21*   --    CKNDX  Cannot be calculated  Cannot be calculated   --    TROIQ   --   0.12*  0.16*     Lab Results Component Value Date/Time    Cholesterol, total 74 04/16/2018 04:21 AM    HDL Cholesterol 25 04/16/2018 04:21 AM    LDL, calculated 31.6 04/16/2018 04:21 AM    Triglyceride 87 04/16/2018 04:21 AM    CHOL/HDL Ratio 3.0 04/16/2018 04:21 AM     Lab Results   Component Value Date/Time    Glucose (POC) 265 (H) 06/13/2018 11:32 AM    Glucose (POC) 200 (H) 06/13/2018 06:45 AM    Glucose (POC) 255 (H) 06/12/2018 09:38 PM    Glucose (POC) 249 (H) 06/12/2018 04:38 PM    Glucose (POC) 301 (H) 06/12/2018 11:54 AM     Lab Results   Component Value Date/Time    Color YELLOW/STRAW 06/12/2018 02:17 PM    Appearance CLOUDY (A) 06/12/2018 02:17 PM    Specific gravity 1.008 06/12/2018 02:17 PM    pH (UA) 5.5 06/12/2018 02:17 PM    Protein NEGATIVE  06/12/2018 02:17 PM    Glucose NEGATIVE  06/12/2018 02:17 PM    Ketone NEGATIVE  06/12/2018 02:17 PM    Bilirubin NEGATIVE  06/12/2018 02:17 PM    Urobilinogen 0.2 06/12/2018 02:17 PM    Nitrites NEGATIVE  06/12/2018 02:17 PM    Leukocyte Esterase LARGE (A) 06/12/2018 02:17 PM    Epithelial cells FEW 06/12/2018 02:17 PM    Bacteria NEGATIVE  06/12/2018 02:17 PM    WBC 20-50 06/12/2018 02:17 PM    RBC 0-5 06/12/2018 02:17 PM         Medications Reviewed:     Current Facility-Administered Medications   Medication Dose Route Frequency    furosemide (LASIX) injection 40 mg  40 mg IntraVENous DAILY    carvedilol (COREG) tablet 12.5 mg  12.5 mg Oral BID WITH MEALS    insulin lispro (HUMALOG) injection   SubCUTAneous AC&HS    glucose chewable tablet 16 g  4 Tab Oral PRN    dextrose (D50W) injection syrg 12.5-25 g  12.5-25 g IntraVENous PRN    glucagon (GLUCAGEN) injection 1 mg  1 mg IntraMUSCular PRN    anastrozole (ARIMIDEX) tablet 1 mg  1 mg Oral DAILY    ascorbic acid (vitamin C) (VITAMIN C) tablet 1,000 mg  1,000 mg Oral DAILY    aspirin delayed-release tablet 81 mg  81 mg Oral DAILY    cholecalciferol (VITAMIN D3) tablet 1,000 Units  1,000 Units Oral DAILY    clopidogrel (PLAVIX) tablet 75 mg  75 mg Oral DAILY    docusate sodium (COLACE) capsule 100 mg  100 mg Oral BID    epoetin celio (EPOGEN;PROCRIT) injection 10,000 Units  10,000 Units SubCUTAneous Q MON, WED & FRI    folic acid (FOLVITE) tablet 1 mg  1 mg Oral DAILY    lactobac ac& pc-s.therm-b.anim (BERYL Q/RISAQUAD)  1 Cap Oral DAILY    levothyroxine (SYNTHROID) tablet 88 mcg  88 mcg Oral ACB    lisinopril (PRINIVIL, ZESTRIL) tablet 2.5 mg  2.5 mg Oral DAILY    nitroglycerin (NITROSTAT) tablet 0.4 mg  0.4 mg SubLINGual PRN    sodium chloride (NS) flush 5-10 mL  5-10 mL IntraVENous Q8H    sodium chloride (NS) flush 5-10 mL  5-10 mL IntraVENous PRN    acetaminophen (TYLENOL) tablet 650 mg  650 mg Oral Q4H PRN    ondansetron (ZOFRAN) injection 4 mg  4 mg IntraVENous Q4H PRN    isosorbide mononitrate ER (IMDUR) tablet 30 mg  30 mg Oral DAILY    [START ON 6/19/2018] evolocumab (REPATHA) syringe 140 mg (Patient Supplied)  140 mg SubCUTAneous Q 14 DAYS    teriparatide (FORTEO) injection 20 mcg (Patient Supplied)  20 mcg SubCUTAneous DAILY    palbociclib cap 125 mg (Patient Supplied)  125 mg Oral DAILY WITH LUNCH    b-complex with vitamin c tablet 1 Tab (Patient Supplied)  1 Tab Oral DAILY     ______________________________________________________________________  EXPECTED LENGTH OF STAY: 4d 7h  ACTUAL LENGTH OF STAY:          1                 Nitza Barrera MD

## 2018-06-13 NOTE — PROGRESS NOTES
2330: Bedside and Verbal shift change report given to [address], RN (oncoming nurse) by ARIANA Anderson (offgoing nurse). Report included the following information SBAR, Kardex, ED Summary, Intake/Output, MAR, Recent Results and Cardiac Rhythm NSR.     0730: Bedside and Verbal shift change report given to Hailey Ring, ARIANA (oncoming nurse) by [address], RN (offgoing nurse). Report included the following information SBAR, Kardex, ED Summary, Intake/Output, MAR, Recent Results and Cardiac Rhythm NSR.

## 2018-06-14 LAB
ANION GAP SERPL CALC-SCNC: 7 MMOL/L (ref 5–15)
BUN SERPL-MCNC: 51 MG/DL (ref 6–20)
BUN/CREAT SERPL: 36 (ref 12–20)
CALCIUM SERPL-MCNC: 8.6 MG/DL (ref 8.5–10.1)
CHLORIDE SERPL-SCNC: 102 MMOL/L (ref 97–108)
CO2 SERPL-SCNC: 24 MMOL/L (ref 21–32)
CREAT SERPL-MCNC: 1.4 MG/DL (ref 0.55–1.02)
ERYTHROCYTE [DISTWIDTH] IN BLOOD BY AUTOMATED COUNT: 19.1 % (ref 11.5–14.5)
GLUCOSE BLD STRIP.AUTO-MCNC: 200 MG/DL (ref 65–100)
GLUCOSE BLD STRIP.AUTO-MCNC: 272 MG/DL (ref 65–100)
GLUCOSE BLD STRIP.AUTO-MCNC: 403 MG/DL (ref 65–100)
GLUCOSE BLD STRIP.AUTO-MCNC: 415 MG/DL (ref 65–100)
GLUCOSE BLD STRIP.AUTO-MCNC: 448 MG/DL (ref 65–100)
GLUCOSE SERPL-MCNC: 180 MG/DL (ref 65–100)
HCT VFR BLD AUTO: 27 % (ref 35–47)
HGB BLD-MCNC: 8.3 G/DL (ref 11.5–16)
MAGNESIUM SERPL-MCNC: 1.9 MG/DL (ref 1.6–2.4)
MCH RBC QN AUTO: 27.1 PG (ref 26–34)
MCHC RBC AUTO-ENTMCNC: 30.7 G/DL (ref 30–36.5)
MCV RBC AUTO: 88.2 FL (ref 80–99)
NRBC # BLD: 0 K/UL (ref 0–0.01)
NRBC BLD-RTO: 0 PER 100 WBC
PLATELET # BLD AUTO: 254 K/UL (ref 150–400)
PMV BLD AUTO: 9.2 FL (ref 8.9–12.9)
POTASSIUM SERPL-SCNC: 4.6 MMOL/L (ref 3.5–5.1)
RBC # BLD AUTO: 3.06 M/UL (ref 3.8–5.2)
SERVICE CMNT-IMP: ABNORMAL
SODIUM SERPL-SCNC: 133 MMOL/L (ref 136–145)
WBC # BLD AUTO: 6.7 K/UL (ref 3.6–11)

## 2018-06-14 PROCEDURE — 83735 ASSAY OF MAGNESIUM: CPT | Performed by: NURSE PRACTITIONER

## 2018-06-14 PROCEDURE — 74011250637 HC RX REV CODE- 250/637: Performed by: INTERNAL MEDICINE

## 2018-06-14 PROCEDURE — 77010033678 HC OXYGEN DAILY

## 2018-06-14 PROCEDURE — 74011250637 HC RX REV CODE- 250/637: Performed by: NURSE PRACTITIONER

## 2018-06-14 PROCEDURE — 74011636637 HC RX REV CODE- 636/637: Performed by: INTERNAL MEDICINE

## 2018-06-14 PROCEDURE — 85027 COMPLETE CBC AUTOMATED: CPT | Performed by: NURSE PRACTITIONER

## 2018-06-14 PROCEDURE — 36415 COLL VENOUS BLD VENIPUNCTURE: CPT | Performed by: NURSE PRACTITIONER

## 2018-06-14 PROCEDURE — 74011250636 HC RX REV CODE- 250/636: Performed by: INTERNAL MEDICINE

## 2018-06-14 PROCEDURE — 80048 BASIC METABOLIC PNL TOTAL CA: CPT | Performed by: NURSE PRACTITIONER

## 2018-06-14 PROCEDURE — 74011636637 HC RX REV CODE- 636/637: Performed by: FAMILY MEDICINE

## 2018-06-14 PROCEDURE — 65270000032 HC RM SEMIPRIVATE

## 2018-06-14 PROCEDURE — 82962 GLUCOSE BLOOD TEST: CPT

## 2018-06-14 RX ORDER — PREDNISONE 20 MG/1
40 TABLET ORAL
Status: COMPLETED | OUTPATIENT
Start: 2018-06-14 | End: 2018-06-14

## 2018-06-14 RX ORDER — NAPROXEN 250 MG/1
500 TABLET ORAL ONCE
Status: COMPLETED | OUTPATIENT
Start: 2018-06-14 | End: 2018-06-14

## 2018-06-14 RX ORDER — INSULIN LISPRO 100 [IU]/ML
8 INJECTION, SOLUTION INTRAVENOUS; SUBCUTANEOUS ONCE
Status: COMPLETED | OUTPATIENT
Start: 2018-06-14 | End: 2018-06-14

## 2018-06-14 RX ORDER — FUROSEMIDE 40 MG/1
40 TABLET ORAL DAILY
Status: DISCONTINUED | OUTPATIENT
Start: 2018-06-14 | End: 2018-06-15

## 2018-06-14 RX ORDER — INSULIN LISPRO 100 [IU]/ML
6 INJECTION, SOLUTION INTRAVENOUS; SUBCUTANEOUS ONCE
Status: COMPLETED | OUTPATIENT
Start: 2018-06-15 | End: 2018-06-15

## 2018-06-14 RX ADMIN — LEVOTHYROXINE SODIUM 88 MCG: 88 TABLET ORAL at 07:05

## 2018-06-14 RX ADMIN — HEPARIN SODIUM 5000 UNITS: 5000 INJECTION, SOLUTION INTRAVENOUS; SUBCUTANEOUS at 18:44

## 2018-06-14 RX ADMIN — Medication 1 CAPSULE: at 09:39

## 2018-06-14 RX ADMIN — PREDNISONE 40 MG: 20 TABLET ORAL at 09:38

## 2018-06-14 RX ADMIN — INSULIN LISPRO 8 UNITS: 100 INJECTION, SOLUTION INTRAVENOUS; SUBCUTANEOUS at 21:51

## 2018-06-14 RX ADMIN — ISOSORBIDE MONONITRATE 30 MG: 30 TABLET, EXTENDED RELEASE ORAL at 09:37

## 2018-06-14 RX ADMIN — INSULIN LISPRO 3 UNITS: 100 INJECTION, SOLUTION INTRAVENOUS; SUBCUTANEOUS at 07:05

## 2018-06-14 RX ADMIN — FOLIC ACID 1 MG: 1 TABLET ORAL at 09:36

## 2018-06-14 RX ADMIN — CLOPIDOGREL BISULFATE 75 MG: 75 TABLET ORAL at 09:39

## 2018-06-14 RX ADMIN — CARVEDILOL 12.5 MG: 12.5 TABLET, FILM COATED ORAL at 18:42

## 2018-06-14 RX ADMIN — OXYCODONE HYDROCHLORIDE AND ACETAMINOPHEN 1000 MG: 500 TABLET ORAL at 09:37

## 2018-06-14 RX ADMIN — ACETAMINOPHEN 650 MG: 325 TABLET ORAL at 03:13

## 2018-06-14 RX ADMIN — INSULIN LISPRO 5 UNITS: 100 INJECTION, SOLUTION INTRAVENOUS; SUBCUTANEOUS at 12:40

## 2018-06-14 RX ADMIN — ASPIRIN 81 MG: 81 TABLET, COATED ORAL at 09:39

## 2018-06-14 RX ADMIN — INSULIN LISPRO 15 UNITS: 100 INJECTION, SOLUTION INTRAVENOUS; SUBCUTANEOUS at 16:30

## 2018-06-14 RX ADMIN — ACETAMINOPHEN 650 MG: 325 TABLET ORAL at 18:43

## 2018-06-14 RX ADMIN — ACETAMINOPHEN 650 MG: 325 TABLET ORAL at 07:09

## 2018-06-14 RX ADMIN — ANASTROZOLE 1 MG: 1 TABLET, COATED ORAL at 09:40

## 2018-06-14 RX ADMIN — CARVEDILOL 12.5 MG: 12.5 TABLET, FILM COATED ORAL at 07:05

## 2018-06-14 RX ADMIN — Medication 10 ML: at 07:10

## 2018-06-14 RX ADMIN — Medication 1 TABLET: at 09:35

## 2018-06-14 RX ADMIN — NAPROXEN 500 MG: 250 TABLET ORAL at 09:53

## 2018-06-14 RX ADMIN — CHOLECALCIFEROL TAB 10 MCG (400 UNIT) 1000 UNITS: 10 TAB at 09:36

## 2018-06-14 RX ADMIN — FUROSEMIDE 40 MG: 40 TABLET ORAL at 12:40

## 2018-06-14 RX ADMIN — HEPARIN SODIUM 5000 UNITS: 5000 INJECTION, SOLUTION INTRAVENOUS; SUBCUTANEOUS at 07:05

## 2018-06-14 RX ADMIN — Medication 10 ML: at 14:00

## 2018-06-14 RX ADMIN — Medication 10 ML: at 21:52

## 2018-06-14 RX ADMIN — DOCUSATE SODIUM 100 MG: 100 CAPSULE, LIQUID FILLED ORAL at 18:43

## 2018-06-14 RX ADMIN — DOCUSATE SODIUM 100 MG: 100 CAPSULE, LIQUID FILLED ORAL at 09:38

## 2018-06-14 NOTE — PROGRESS NOTES
Hospitalist Progress Note  Milan Starr MD  Answering service: [phone] or [phone] from in house phone         Date of Service:  2018  NAME:  Lyndsay Mead  :  1935  MRN:  [id number]      Admission Summary:   CHIEF COMPLAINT:  Shortness of breath.       HISTORY OF PRESENT ILLNESS:  This is an 68-year-old woman with a past medical history significant for coronary artery disease, chronic systolic congestive heart failure, metastatic left breast cancer, hypertension, type 2 diabetes, hypothyroidism, dyslipidemia, rheumatoid arthritis, chronic kidney disease who was in her usual state of health until the day of her presentation at the emergency room when the patient developed shortness of breath at the rehab facility where the patient is receiving rehabilitation. The patient has had 4 myocardial infarctions since April of this year, each episode presenting with shortness of breath. The patient's son was present at [facility] where the patient is receiving rehabilitation when she developed shortness of breath. She received 3 doses of nitroglycerin, and he advised the facility to send the patient to the emergency room for further evaluation. It was reported that the patient's oxygen saturation was 87%. The patient has had multiple admissions to this hospital for evaluation and treatment of a non-ST elevation myocardial infarction. The last admission to this hospital was from 2018 to 2018. The patient was admitted and treated for non-ST elevation myocardial cardiac infarction. Since April, the patient has been having recurrent myocardial infarctions. She has had a cardiac catheterization that shows a significant lesion, but patient is not a candidate for surgical intervention or PCI/stent placement. When the patient arrived at the emergency room, she was found to have an elevated troponin level.   The patient was referred to the hospitalist service for evaluation for admission. No history of fever, no rigors and no chills. Interval history / Subjective:       6/13:  Usual breast cancer related lt lat chest pain, pt usually up walking at rehab per her report, will obtain 6 min walk 02 test.     1/14:  Slight bump in Cr. Pt is likely to go hospice soon  dispo to accepting snf/rehab today , can f/u on Cr and dose lasix accordingly on discharge with close f/u lab. If here in am (likely) will repeat the Cr /K ,/Na ( now 133) and go from there, agree no lasix today. Assessment & Plan:     NSTMI. Stable,  Elevated troponin/CAD: severe multivessel disease  -Troponin 0.16 (less than last admission- was 1.44)  -12 lead EKG:  ST, lateral ST/T wave abnormality, not significantly changed from 6/3  -Chest pain resolved   -Not candidate for CABG or intervention.  -Continue ASA, plavix, BB, low dose ace-Iand repatha (if family able to bring in). No statin due to intolerance in past.  -Will add Imdur 30 mg daily   on coreg  But   Lisinopril held 2n2 to renal issues    Acute on chronic syst HF  NYHA IV EF 30 % NWMA,( reduced form 40 % on [facility] 5/21)   Left ventricle: Systolic function was moderately to markedly reduced. Ejection fraction was estimated to be 30 %. No obvious wall motion  abnormalities identified in the views obtained. Probable gout rt wrist/hand  prednison 40 mg x 1 , monitor. F/u on taper dose prednisone on discharge. ( one dose naprosyn 6/14/2018  , will add prn norco as well on discharge) f/u with rhematology as needed as an out pt. CKD 3  Lab Results   Component Value Date/Time    Creatinine 1.40 (H) 06/14/2018 06:59 AM      HTN, on coreg  But   Lisinopril held 2n2 to renal issues.    BP Readings from Last 1 Encounters:   06/14/18 90/47        Anemia, chronic   Lab Results   Component Value Date/Time    HGB 8.3 (L) 06/14/2018 06:59 AM        Metastatic breast cancer, crurent and colon cancer followed by hem/onc    Code status: DNR  DVT prophylaxis: heparin     Care Plan discussed with: Patient/Family and Nurse  Disposition: SNF/LTC and TBD discussing with cm and son( to call after pt/ot eval )     Hospital Problems  Date Reviewed: 6/12/2018          Codes Class Noted POA    * (Principal)NSTEMI (non-ST elevated myocardial infarction) ([facility] [address] ICD-10-CM: I21.4  ICD-9-CM: 410.70  6/1/2018 Yes                Review of Systems:   A comprehensive review of systems was negative. other than lt lat cp, chronic and c/c her breast cancer history. Vital Signs:    Last 24hrs VS reviewed since prior progress note. Most recent are:  Visit Vitals    BP 90/47 (BP 1 Location: Right arm, BP Patient Position: Head of bed elevated (Comment degrees))    Pulse 74    Temp 98.5 °F (36.9 °C)    Resp 16    Ht 5' 1\" (1.549 m)    Wt 51.7 kg (113 lb 15.7 oz)    SpO2 97%    BMI 21.54 kg/m2         Intake/Output Summary (Last 24 hours) at 06/14/18 0940  Last data filed at 06/14/18 0314   Gross per 24 hour   Intake              120 ml   Output             1100 ml   Net             -980 ml        Physical Examination:             Constitutional:  No acute distress, cooperative, pleasant    ENT:  Oral mucous moist, oropharynx benign. Neck supple,    Resp:  CTA bilaterally. No wheezing/rhonchi/rales. No accessory muscle use   CV:  Regular rhythm, normal rate, no definite  gallops, rubs    GI:  Soft, non distended, non tender. normoactive bowel sounds, no hepatosplenomegaly     Musculoskeletal:  No edema, warm, 1+ pulses throughout    Neurologic:  Moves all extremities. AAOx3, CN II-XII reviewed     Psych:  Good insight, Not anxious nor agitated.   Skin:  Good turgor, no rashes or ulcers       Data Review:    Review and/or order of clinical lab test      Labs:     Recent Labs      06/14/18 0659  06/13/18   0200   WBC  6.7  6.6   HGB  8.3*  8.2*   HCT  27.0*  26.4*   PLT  254  277     Recent Labs      06/14/18   0659  06/13/18   0200 06/12/18   0843  06/12/18 0045   NA  133*  137   --   133*   K  4.6  4.7   --   4.6   CL  102  102   --   99   CO2  24  26   --   26   BUN  51*  45*   --   33*   CREA  1.40*  1.17*   --   1.14*   GLU  180*  180*   --   326*   CA  8.6  8.1*   --   9.4   MG  1.9   --   1.8   --    PHOS   --    --   4.3   --      Recent Labs      06/13/18   0200  06/12/18   0045   SGOT  23  21   ALT  19  22   AP  602*  668*   TBILI  0.3  0.3   TP  6.8  7.6   ALB  2.3*  2.5*   GLOB  4.5*  5.1*     Recent Labs      06/13/18   0200  06/12/18 1758  06/12/18   1023   APTT  56.7*  48.9*  58.6*      No results for input(s): FE, TIBC, PSAT, FERR in the last 72 hours. No results found for: FOL, RBCF   No results for input(s): PH, PCO2, PO2 in the last 72 hours.   Recent Labs      06/12/18 1758 06/12/18   0843  06/12/18 0045   CPK  18*  21*   --    CKNDX  Cannot be calculated  Cannot be calculated   --    TROIQ   --   0.12*  0.16*     Lab Results   Component Value Date/Time    Cholesterol, total 74 04/16/2018 04:21 AM    HDL Cholesterol 25 04/16/2018 04:21 AM    LDL, calculated 31.6 04/16/2018 04:21 AM    Triglyceride 87 04/16/2018 04:21 AM    CHOL/HDL Ratio 3.0 04/16/2018 04:21 AM     Lab Results   Component Value Date/Time    Glucose (POC) 200 (H) 06/14/2018 06:29 AM    Glucose (POC) 288 (H) 06/13/2018 08:57 PM    Glucose (POC) 287 (H) 06/13/2018 04:49 PM    Glucose (POC) 265 (H) 06/13/2018 11:32 AM    Glucose (POC) 200 (H) 06/13/2018 06:45 AM     Lab Results   Component Value Date/Time    Color YELLOW/STRAW 06/12/2018 02:17 PM    Appearance CLOUDY (A) 06/12/2018 02:17 PM    Specific gravity 1.008 06/12/2018 02:17 PM    pH (UA) 5.5 06/12/2018 02:17 PM    Protein NEGATIVE  06/12/2018 02:17 PM    Glucose NEGATIVE  06/12/2018 02:17 PM    Ketone NEGATIVE  06/12/2018 02:17 PM    Bilirubin NEGATIVE  06/12/2018 02:17 PM    Urobilinogen 0.2 06/12/2018 02:17 PM    Nitrites NEGATIVE  06/12/2018 02:17 PM    Leukocyte Esterase LARGE (A) 06/12/2018 02:17 PM    Epithelial cells FEW 06/12/2018 02:17 PM    Bacteria NEGATIVE  06/12/2018 02:17 PM    WBC 20-50 06/12/2018 02:17 PM    RBC 0-5 06/12/2018 02:17 PM         Medications Reviewed:     Current Facility-Administered Medications   Medication Dose Route Frequency    naproxen (NAPROSYN) tablet 500 mg  500 mg Oral ONCE    carvedilol (COREG) tablet 12.5 mg  12.5 mg Oral BID WITH MEALS    heparin (porcine) injection 5,000 Units  5,000 Units SubCUTAneous Q8H    insulin lispro (HUMALOG) injection   SubCUTAneous AC&HS    glucose chewable tablet 16 g  4 Tab Oral PRN    dextrose (D50W) injection syrg 12.5-25 g  12.5-25 g IntraVENous PRN    glucagon (GLUCAGEN) injection 1 mg  1 mg IntraMUSCular PRN    anastrozole (ARIMIDEX) tablet 1 mg  1 mg Oral DAILY    ascorbic acid (vitamin C) (VITAMIN C) tablet 1,000 mg  1,000 mg Oral DAILY    aspirin delayed-release tablet 81 mg  81 mg Oral DAILY    cholecalciferol (VITAMIN D3) tablet 1,000 Units  1,000 Units Oral DAILY    clopidogrel (PLAVIX) tablet 75 mg  75 mg Oral DAILY    docusate sodium (COLACE) capsule 100 mg  100 mg Oral BID    epoetin celio (EPOGEN;PROCRIT) injection 10,000 Units  10,000 Units SubCUTAneous Q MON, WED & FRI    folic acid (FOLVITE) tablet 1 mg  1 mg Oral DAILY    lactobac ac& pc-s.therm-b.anim (BERYL Q/RISAQUAD)  1 Cap Oral DAILY    levothyroxine (SYNTHROID) tablet 88 mcg  88 mcg Oral ACB    nitroglycerin (NITROSTAT) tablet 0.4 mg  0.4 mg SubLINGual PRN    sodium chloride (NS) flush 5-10 mL  5-10 mL IntraVENous Q8H    sodium chloride (NS) flush 5-10 mL  5-10 mL IntraVENous PRN    acetaminophen (TYLENOL) tablet 650 mg  650 mg Oral Q4H PRN    ondansetron (ZOFRAN) injection 4 mg  4 mg IntraVENous Q4H PRN    isosorbide mononitrate ER (IMDUR) tablet 30 mg  30 mg Oral DAILY    [START ON 6/19/2018] evolocumab (REPATHA) syringe 140 mg (Patient Supplied)  140 mg SubCUTAneous Q 14 DAYS    teriparatide (FORTEO) injection 20 mcg (Patient Supplied)  20 mcg SubCUTAneous DAILY    palbociclib cap 125 mg (Patient Supplied)  125 mg Oral DAILY WITH LUNCH    b-complex with vitamin c tablet 1 Tab (Patient Supplied)  1 Tab Oral DAILY     ______________________________________________________________________  EXPECTED LENGTH OF STAY: 4d 7h  ACTUAL LENGTH OF STAY:          2                 Prem Smith MD

## 2018-06-14 NOTE — PROGRESS NOTES
Occupational Therapy: defer    Chart reviewed, consulted with physical therapist. Patient with c/o R wrist pain, noted gout flare in chart. Patient repeatedly refusing participation at this time secondary her wrist pain and fatigue. Will defer at this time and will f/u as able and appropriate.     Brigida Tuttle, OTR/L

## 2018-06-14 NOTE — DIABETES MGMT
DTC Progress Note    Recommendations/ Comments: Chart reviewed due to hyperglycemia. Noted Prednisone 40 mg at breakfast ordered. If appropriate, please consider starting home dose of Amaryl 1mg and Januvia 25 mg. Current hospital DM medication: correction scale Humalog, normal sensitivity. Chart reviewed on [facility] Ledesma. Patient is a 82 y.o. female with known diabetes on Amaryl 1 mg and Januvia 50 mg at home. A1c:   Lab Results   Component Value Date/Time    Hemoglobin A1c 7.0 (H) 04/18/2018 08:14 AM    Hemoglobin A1c 6.9 (H) 11/30/2011 08:30 AM       Recent Glucose Results:   Lab Results   Component Value Date/Time     (H) 06/14/2018 06:59 AM    GLUCPOC 200 (H) 06/14/2018 06:29 AM    GLUCPOC 288 (H) 06/13/2018 08:57 PM    GLUCPOC 287 (H) 06/13/2018 04:49 PM        Lab Results   Component Value Date/Time    Creatinine 1.40 (H) 06/14/2018 06:59 AM     Estimated Creatinine Clearance: 23.8 mL/min (based on Cr of 1.4). Active Orders   Diet    DIET DIABETIC CONSISTENT CARB Regular; 2 GM NA (House Low NA)        PO intake:   Patient Vitals for the past 72 hrs:   % Diet Eaten   06/13/18 1128 50 %   06/13/18 0757 50 %   06/12/18 1528 50 %   06/12/18 0812 50 %       Will continue to follow as needed.     Thank you  Azam Snow RD, CDE

## 2018-06-14 NOTE — PROGRESS NOTES
6/14/18 1148: [facility] pending insurance authorization. CM s/w pt's son Cordell Engle, confirmed choice of [facility]. ANDRE s/w [facility] liaison Augustine Hollis to initiate insurance authorization request immediately. Discharge pending insurance authorization. CM to follow. ---------------------------  6/14/18 0936  CM s/w patient's son Cordell Engle [phone] regarding SNF placement. Cely Webb has reviewed chart, confirmed that patient not appropriate for [facility]. Robin confirmed understanding. In re: SNF choice, Robin requests 1) [facility], 2) [facility]. CM called [facility] to check bed availability, left message requesting return call. CM sent CCLink referrals to [facility] and [facility]. Discharge pending accepting facility and insurance authorization.      JOSE Mcneil

## 2018-06-14 NOTE — PROGRESS NOTES
Bedside shift change report given to Brittany (oncoming nurse) by Jerica Bunn (offgoing nurse). Report included the following information SBAR, Kardex, Intake/Output, MAR and Recent Results.

## 2018-06-14 NOTE — PROGRESS NOTES
Hospitalist Progress Note  Nitza Barrera MD  Answering service: [phone] from in house phone         Date of Service:  2018  NAME:  Idalia Cabrera  :  1935  MRN:  [id number]      Admission Summary:   CHIEF COMPLAINT:  Shortness of breath.       HISTORY OF PRESENT ILLNESS:  This is an 82-year-old woman with a past medical history significant for coronary artery disease, chronic systolic congestive heart failure, metastatic left breast cancer, hypertension, type 2 diabetes, hypothyroidism, dyslipidemia, rheumatoid arthritis, chronic kidney disease who was in her usual state of health until the day of her presentation at the emergency room when the patient developed shortness of breath at the rehab facility where the patient is receiving rehabilitation. The patient has had 4 myocardial infarctions since April of this year, each episode presenting with shortness of breath. The patient's son was present at [facility] where the patient is receiving rehabilitation when she developed shortness of breath. She received 3 doses of nitroglycerin, and he advised the facility to send the patient to the emergency room for further evaluation. It was reported that the patient's oxygen saturation was 87%. The patient has had multiple admissions to this hospital for evaluation and treatment of a non-ST elevation myocardial infarction. The last admission to this hospital was from 2018 to 2018. The patient was admitted and treated for non-ST elevation myocardial cardiac infarction. Since April, the patient has been having recurrent myocardial infarctions. She has had a cardiac catheterization that shows a significant lesion, but patient is not a candidate for surgical intervention or PCI/stent placement. When the patient arrived at the emergency room, she was found to have an elevated troponin level.   The patient was referred to the hospitalist service for evaluation for admission. No history of fever, no rigors and no chills. Interval history / Subjective:       6/13:  Usual breast cancer related lt lat chest pain, pt usually up walking at rehab per her report, will obtain 6 min walk 02 test.     1/14:  Slight bump in Cr. Pt is likely to go hospice soon  dispo to accepting snf/rehab today , can f/u on Cr and dose lasix accordingly on discharge with close f/u lab. If here in am (likely) will repeat the Cr /K ,/Na ( now 133) and go from there, agree no lasix today. Assessment & Plan:     NSTMI. Stable,  Elevated troponin/CAD: severe multivessel disease  -Troponin 0.16 (less than last admission- was 1.44)  -12 lead EKG:  ST, lateral ST/T wave abnormality, not significantly changed from 6/3  -Chest pain resolved   -Not candidate for CABG or intervention.  -Continue ASA, plavix, BB, low dose ace-Iand repatha (if family able to bring in). No statin due to intolerance in past.  -Will add Imdur 30 mg daily   on coreg  But   Lisinopril held 2n2 to renal issues    Acute on chronic syst HF  NYHA IV EF 30 % NWMA,( reduced form 40 % on [facility] 5/21)   Left ventricle: Systolic function was moderately to markedly reduced. Ejection fraction was estimated to be 30 %. No obvious wall motion  abnormalities identified in the views obtained. Probable gout rt wrist/hand  prednison 40 mg x 1 , monitor. F/u on taper dose prednisone on discharge. CKD 3  Lab Results   Component Value Date/Time    Creatinine 1.40 (H) 06/14/2018 06:59 AM      HTN, on coreg  But   Lisinopril held 2n2 to renal issues.    BP Readings from Last 1 Encounters:   06/14/18 90/47        Anemia, chronic   Lab Results   Component Value Date/Time    HGB 8.3 (L) 06/14/2018 06:59 AM        Metastatic breast cancer, crurent and colon cancer followed by hem/onc    Code status: DNR  DVT prophylaxis: heparin     Care Plan discussed with: Patient/Family and Nurse  Disposition: SNF/LTC and TBD discussing with cm and son( to call after pt/ot eval )     Hospital Problems  Date Reviewed: 6/12/2018          Codes Class Noted POA    * (Principal)NSTEMI (non-ST elevated myocardial infarction) [facility]) ICD-10-CM: I21.4  ICD-9-CM: 410.70  6/1/2018 Yes                Review of Systems:   A comprehensive review of systems was negative. other than lt lat cp, chronic and c/c her breast cancer history. Vital Signs:    Last 24hrs VS reviewed since prior progress note. Most recent are:  Visit Vitals    BP 90/47 (BP 1 Location: Right arm, BP Patient Position: Head of bed elevated (Comment degrees))    Pulse 74    Temp 98.5 °F (36.9 °C)    Resp 16    Ht 5' 1\" (1.549 m)    Wt 51.7 kg (113 lb 15.7 oz)    SpO2 97%    BMI 21.54 kg/m2         Intake/Output Summary (Last 24 hours) at 06/14/18 0817  Last data filed at 06/14/18 0314   Gross per 24 hour   Intake              120 ml   Output             1100 ml   Net             -980 ml        Physical Examination:             Constitutional:  No acute distress, cooperative, pleasant    ENT:  Oral mucous moist, oropharynx benign. Neck supple,    Resp:  CTA bilaterally. No wheezing/rhonchi/rales. No accessory muscle use   CV:  Regular rhythm, normal rate, no definite  gallops, rubs    GI:  Soft, non distended, non tender. normoactive bowel sounds, no hepatosplenomegaly     Musculoskeletal:  No edema, warm, 1+ pulses throughout    Neurologic:  Moves all extremities. AAOx3, CN II-XII reviewed     Psych:  Good insight, Not anxious nor agitated.   Skin:  Good turgor, no rashes or ulcers       Data Review:    Review and/or order of clinical lab test      Labs:     Recent Labs      06/14/18   0659  06/13/18   0200   WBC  6.7  6.6   HGB  8.3*  8.2*   HCT  27.0*  26.4*   PLT  254  277     Recent Labs      06/14/18   0659  06/13/18   0200  06/12/18   0843  06/12/18   0045   NA  133*  137   --   133*   K  4.6  4.7   --   4.6   CL  102  102   --   99   CO2  24  26 --   26   BUN  51*  45*   --   33*   CREA  1.40*  1.17*   --   1.14*   GLU  180*  180*   --   326*   CA  8.6  8.1*   --   9.4   MG  1.9   --   1.8   --    PHOS   --    --   4.3   --      Recent Labs      06/13/18   0200  06/12/18   0045   SGOT  23  21   ALT  19  22   AP  602*  668*   TBILI  0.3  0.3   TP  6.8  7.6   ALB  2.3*  2.5*   GLOB  4.5*  5.1*     Recent Labs      06/13/18   0200  06/12/18   1758  06/12/18   1023   APTT  56.7*  48.9*  58.6*      No results for input(s): FE, TIBC, PSAT, FERR in the last 72 hours. No results found for: FOL, RBCF   No results for input(s): PH, PCO2, PO2 in the last 72 hours.   Recent Labs      06/12/18   1758  06/12/18   0843  06/12/18   0045   CPK  18*  21*   --    CKNDX  Cannot be calculated  Cannot be calculated   --    TROIQ   --   0.12*  0.16*     Lab Results   Component Value Date/Time    Cholesterol, total 74 04/16/2018 04:21 AM    HDL Cholesterol 25 04/16/2018 04:21 AM    LDL, calculated 31.6 04/16/2018 04:21 AM    Triglyceride 87 04/16/2018 04:21 AM    CHOL/HDL Ratio 3.0 04/16/2018 04:21 AM     Lab Results   Component Value Date/Time    Glucose (POC) 200 (H) 06/14/2018 06:29 AM    Glucose (POC) 288 (H) 06/13/2018 08:57 PM    Glucose (POC) 287 (H) 06/13/2018 04:49 PM    Glucose (POC) 265 (H) 06/13/2018 11:32 AM    Glucose (POC) 200 (H) 06/13/2018 06:45 AM     Lab Results   Component Value Date/Time    Color YELLOW/STRAW 06/12/2018 02:17 PM    Appearance CLOUDY (A) 06/12/2018 02:17 PM    Specific gravity 1.008 06/12/2018 02:17 PM    pH (UA) 5.5 06/12/2018 02:17 PM    Protein NEGATIVE  06/12/2018 02:17 PM    Glucose NEGATIVE  06/12/2018 02:17 PM    Ketone NEGATIVE  06/12/2018 02:17 PM    Bilirubin NEGATIVE  06/12/2018 02:17 PM    Urobilinogen 0.2 06/12/2018 02:17 PM    Nitrites NEGATIVE  06/12/2018 02:17 PM    Leukocyte Esterase LARGE (A) 06/12/2018 02:17 PM    Epithelial cells FEW 06/12/2018 02:17 PM    Bacteria NEGATIVE  06/12/2018 02:17 PM    WBC 20-50 06/12/2018 02:17 PM    RBC 0-5 06/12/2018 02:17 PM         Medications Reviewed:     Current Facility-Administered Medications   Medication Dose Route Frequency    predniSONE (DELTASONE) tablet 40 mg  40 mg Oral NOW    carvedilol (COREG) tablet 12.5 mg  12.5 mg Oral BID WITH MEALS    heparin (porcine) injection 5,000 Units  5,000 Units SubCUTAneous Q8H    insulin lispro (HUMALOG) injection   SubCUTAneous AC&HS    glucose chewable tablet 16 g  4 Tab Oral PRN    dextrose (D50W) injection syrg 12.5-25 g  12.5-25 g IntraVENous PRN    glucagon (GLUCAGEN) injection 1 mg  1 mg IntraMUSCular PRN    anastrozole (ARIMIDEX) tablet 1 mg  1 mg Oral DAILY    ascorbic acid (vitamin C) (VITAMIN C) tablet 1,000 mg  1,000 mg Oral DAILY    aspirin delayed-release tablet 81 mg  81 mg Oral DAILY    cholecalciferol (VITAMIN D3) tablet 1,000 Units  1,000 Units Oral DAILY    clopidogrel (PLAVIX) tablet 75 mg  75 mg Oral DAILY    docusate sodium (COLACE) capsule 100 mg  100 mg Oral BID    epoetin celio (EPOGEN;PROCRIT) injection 10,000 Units  10,000 Units SubCUTAneous Q MON, WED & FRI    folic acid (FOLVITE) tablet 1 mg  1 mg Oral DAILY    lactobac ac& pc-s.therm-b.anim (BERYL Q/RISAQUAD)  1 Cap Oral DAILY    levothyroxine (SYNTHROID) tablet 88 mcg  88 mcg Oral ACB    nitroglycerin (NITROSTAT) tablet 0.4 mg  0.4 mg SubLINGual PRN    sodium chloride (NS) flush 5-10 mL  5-10 mL IntraVENous Q8H    sodium chloride (NS) flush 5-10 mL  5-10 mL IntraVENous PRN    acetaminophen (TYLENOL) tablet 650 mg  650 mg Oral Q4H PRN    ondansetron (ZOFRAN) injection 4 mg  4 mg IntraVENous Q4H PRN    isosorbide mononitrate ER (IMDUR) tablet 30 mg  30 mg Oral DAILY    [START ON 6/19/2018] evolocumab (REPATHA) syringe 140 mg (Patient Supplied)  140 mg SubCUTAneous Q 14 DAYS    teriparatide (FORTEO) injection 20 mcg (Patient Supplied)  20 mcg SubCUTAneous DAILY    palbociclib cap 125 mg (Patient Supplied)  125 mg Oral DAILY WITH LUNCH    b-complex with vitamin c tablet 1 Tab (Patient Supplied)  1 Tab Oral DAILY     ______________________________________________________________________  EXPECTED LENGTH OF STAY: 4d 7h  ACTUAL LENGTH OF STAY:          2                 Brittny Samson MD

## 2018-06-14 NOTE — PROGRESS NOTES
Bedside and Verbal shift change report given to Mickey Burgos (oncoming nurse) by Mancil Holstein (offgoing nurse). Report included the following information SBAR, Kardex, Procedure Summary, Intake/Output, MAR, Accordion and Recent Results.

## 2018-06-14 NOTE — CDMP QUERY
To accurately capture the SOI & ROM please clarify if this patient is (was) being treated/managed for: Possible UTI (POA) in the setting of abnormal U/A.    => Urinary tract infection, site not specified  => Other explanation of clinical findings  => Clinically Undetermined (no explanation for clinical findings)    The medical record reflects the following clinical findings, treatment, and risk factors. Risk Factors:  Debility    Clinical Indicators:    6/12 U/A  Leukocyte Esterase: LARGE   WBC: 20-50  Bacteria: NEGATIVE    Treatment: Lab monitoring     Please clarify and document your clinical opinion in the progress notes and discharge summary including the definitive and/or presumptive diagnosis, (suspected or probable), related to the above clinical findings. Please include clinical findings supporting your diagnosis.     Thank you  Griselda Villa  [facility]  [phone]

## 2018-06-14 NOTE — PROGRESS NOTES
PT Note:    Order received and acknowledged. Chart reviewed. Patient eating in bed with family assistance. Patient with c/o R wrist pain, noted gout flare in chart. Patient repeatedly refusing participation at this time secondary her wrist pain and fatigue. Family present and in agreement for patient to wait participation. Will continue to follow.

## 2018-06-14 NOTE — PROGRESS NOTES
Cardiology Progress Note            Admit Date: 6/12/2018  Admit Diagnosis: NSTEMI (non-ST elevated myocardial infarction) [facility])  Date: 6/14/2018     Time: 8:50 AM    HPI: Pt with severe multivessel CAD, multiple NSTEMI,  metastatic breast cancer, ckd. Presents 6/11/18 from [facility] with chest pressure and SOB. Mild elevation in troponin- peak 0.16. Subjective:  Doing better today & denies chest pain, pressure, palpitations, SOB. Having right wrist pain, says she has a hx of RA. Assessment and Plan     1. Elevated troponin/CAD: severe multivessel disease  -Troponin trended down to 0.12 from 0.16 (less than last admission- was 1.44)  -Troponin elevation likely represents demand from fluid overload, not NSTEMI  -12 lead EKG:  ST, lateral ST/T wave abnormality, not significantly changed from 6/3  -Chest pain resolved. Will Discontinue Imdur.  -Not candidate for CABG or intervention.  -Continue ASA, plavix, BB, low dose ACE-I and repatha (if family able to bring in). No statin due to intolerance in past.    2. Acute on chronic systolic CHF: NYHA class IV on admission, class III today.  -6/1/18 echoEF 30%. NWMA.  (reduced from EF 40% on [address] [place] [address] 5/21/18)  -Aldactone stopped last admission due to hyperkalemia   -Continue Coreg 12.5 mg BID  -Daily I/Os and weights- Weight less than at discharge last admission.    -change lasix to po- 40mg PO daily       3. CKD: Stage III. Creatinine worsened, 1.40 from 1.17, GFR 44  -Follow renal function closely  -Hold Ace-I      4. Hx of HTN:  BP controlled  -On Coreg   -Holding lisinopril for now d/t elevated creatinine     5. Elevated alk phos: trending down slightly      6. Anemia: hgb stable (8.3 today, 8.2 yesterday)     7. Hx of metastatic breast cancer (current) and colon ca. -Per heme/onc    8. Advanced directive: DNR. ?needs DDNR    Seems better today, no complaints of chest pain or SOB. Creatinine & BUN elevated today - holding Lisinopril for now. Hgb unchanged from yesterday. BP doing well with increased dose of Coreg. Transition to PO Lasix today. Will check labs in the AM. Would not discharge today- Possibly will be ready for DC tomorrow  Cardiology Attending:Patient seen and examined. I agree with NP assessment and plans. Rales gone, may have flare of RA. Milagros Waller MD 6/14/2018 3:38 PM       Objective:      Physical Exam:                Visit Vitals    BP 90/47 (BP 1 Location: Right arm, BP Patient Position: Head of bed elevated (Comment degrees))    Pulse 74    Temp 98.5 °F (36.9 °C)    Resp 16    Ht 5' 1\" (1.549 m)    Wt 51.7 kg (113 lb 15.7 oz)    SpO2 97%    BMI 21.54 kg/m2          General Appearance:   Alert and oriented x 3, and   individual in no acute distress. Ears/Nose/Mouth/Throat:    Hearing grossly normal.         Neck:  Supple. Chest:    Fine crackles in the right lower base, but otherwise CTA. No use of accessory muscles. Cardiovascular:   Regular rate and rhythm, S1, S2 normal, grade I/VI systolic murmur LUSB. Abdomen:    Deferred   Extremities:  No edema bilaterally. Skin:  Warm and dry.   MS: right wrist edematous, erythematous, TTP     Telemetry: normal sinus rhythm          Data Review:    Labs:    Recent Results (from the past 24 hour(s))   GLUCOSE, POC    Collection Time: 06/13/18 11:32 AM   Result Value Ref Range    Glucose (POC) 265 (H) 65 - 100 mg/dL    Performed by Gerardo SHANKAR    GLUCOSE, POC    Collection Time: 06/13/18  4:49 PM   Result Value Ref Range    Glucose (POC) 287 (H) 65 - 100 mg/dL    Performed by Rick Holt    GLUCOSE, POC    Collection Time: 06/13/18  8:57 PM   Result Value Ref Range    Glucose (POC) 288 (H) 65 - 100 mg/dL    Performed by Rick Holt    GLUCOSE, POC    Collection Time: 06/14/18  6:29 AM   Result Value Ref Range    Glucose (POC) 200 (H) 65 - 100 mg/dL    Performed by Marquis Lang LUA    METABOLIC PANEL, BASIC    Collection Time: 06/14/18  6:59 AM   Result Value Ref Range    Sodium 133 (L) 136 - 145 mmol/L    Potassium 4.6 3.5 - 5.1 mmol/L    Chloride 102 97 - 108 mmol/L    CO2 24 21 - 32 mmol/L    Anion gap 7 5 - 15 mmol/L    Glucose 180 (H) 65 - 100 mg/dL    BUN 51 (H) 6 - 20 MG/DL    Creatinine 1.40 (H) 0.55 - 1.02 MG/DL    BUN/Creatinine ratio 36 (H) 12 - 20      GFR est AA 44 (L) >60 ml/min/1.73m2    GFR est non-AA 36 (L) >60 ml/min/1.73m2    Calcium 8.6 8.5 - 10.1 MG/DL   MAGNESIUM    Collection Time: 06/14/18  6:59 AM   Result Value Ref Range    Magnesium 1.9 1.6 - 2.4 mg/dL   CBC W/O DIFF    Collection Time: 06/14/18  6:59 AM   Result Value Ref Range    WBC 6.7 3.6 - 11.0 K/uL    RBC 3.06 (L) 3.80 - 5.20 M/uL    HGB 8.3 (L) 11.5 - 16.0 g/dL    HCT 27.0 (L) 35.0 - 47.0 %    MCV 88.2 80.0 - 99.0 FL    MCH 27.1 26.0 - 34.0 PG    MCHC 30.7 30.0 - 36.5 g/dL    RDW 19.1 (H) 11.5 - 14.5 %    PLATELET 256 777 - 859 K/uL    MPV 9.2 8.9 - 12.9 FL    NRBC 0.0 0  WBC    ABSOLUTE NRBC 0.00 0.00 - 0.01 K/uL          Radiology:        Current Facility-Administered Medications   Medication Dose Route Frequency    predniSONE (DELTASONE) tablet 40 mg  40 mg Oral NOW    carvedilol (COREG) tablet 12.5 mg  12.5 mg Oral BID WITH MEALS    heparin (porcine) injection 5,000 Units  5,000 Units SubCUTAneous Q8H    insulin lispro (HUMALOG) injection   SubCUTAneous AC&HS    glucose chewable tablet 16 g  4 Tab Oral PRN    dextrose (D50W) injection syrg 12.5-25 g  12.5-25 g IntraVENous PRN    glucagon (GLUCAGEN) injection 1 mg  1 mg IntraMUSCular PRN    anastrozole (ARIMIDEX) tablet 1 mg  1 mg Oral DAILY    ascorbic acid (vitamin C) (VITAMIN C) tablet 1,000 mg  1,000 mg Oral DAILY    aspirin delayed-release tablet 81 mg  81 mg Oral DAILY    cholecalciferol (VITAMIN D3) tablet 1,000 Units  1,000 Units Oral DAILY    clopidogrel (PLAVIX) tablet 75 mg  75 mg Oral DAILY    docusate sodium (COLACE) capsule 100 mg  100 mg Oral BID    epoetin celio (EPOGEN;PROCRIT) injection 10,000 Units  10,000 Units SubCUTAneous Q MON, WED & FRI    folic acid (FOLVITE) tablet 1 mg  1 mg Oral DAILY    lactobac ac& pc-s.therm-b.anim (BERYL Q/RISAQUAD)  1 Cap Oral DAILY    levothyroxine (SYNTHROID) tablet 88 mcg  88 mcg Oral ACB    nitroglycerin (NITROSTAT) tablet 0.4 mg  0.4 mg SubLINGual PRN    sodium chloride (NS) flush 5-10 mL  5-10 mL IntraVENous Q8H    sodium chloride (NS) flush 5-10 mL  5-10 mL IntraVENous PRN    acetaminophen (TYLENOL) tablet 650 mg  650 mg Oral Q4H PRN    ondansetron (ZOFRAN) injection 4 mg  4 mg IntraVENous Q4H PRN    isosorbide mononitrate ER (IMDUR) tablet 30 mg  30 mg Oral DAILY    [START ON 6/19/2018] evolocumab (REPATHA) syringe 140 mg (Patient Supplied)  140 mg SubCUTAneous Q 14 DAYS    teriparatide (FORTEO) injection 20 mcg (Patient Supplied)  20 mcg SubCUTAneous DAILY    palbociclib cap 125 mg (Patient Supplied)  125 mg Oral DAILY WITH LUNCH    b-complex with vitamin c tablet 1 Tab (Patient Supplied)  1 Tab Oral DAILY       GABRIELLE Bland NP Rochele Number, MD     [facility] [address]   [facility]   [phone]

## 2018-06-15 ENCOUNTER — APPOINTMENT (OUTPATIENT)
Dept: GENERAL RADIOLOGY | Age: 83
DRG: 189 | End: 2018-06-15
Attending: FAMILY MEDICINE
Payer: MEDICARE

## 2018-06-15 LAB
ANION GAP SERPL CALC-SCNC: 9 MMOL/L (ref 5–15)
BUN SERPL-MCNC: 66 MG/DL (ref 6–20)
BUN/CREAT SERPL: 36 (ref 12–20)
CALCIUM SERPL-MCNC: 8.9 MG/DL (ref 8.5–10.1)
CHLORIDE SERPL-SCNC: 101 MMOL/L (ref 97–108)
CO2 SERPL-SCNC: 23 MMOL/L (ref 21–32)
CREAT SERPL-MCNC: 1.83 MG/DL (ref 0.55–1.02)
GLUCOSE BLD STRIP.AUTO-MCNC: 217 MG/DL (ref 65–100)
GLUCOSE BLD STRIP.AUTO-MCNC: 285 MG/DL (ref 65–100)
GLUCOSE BLD STRIP.AUTO-MCNC: 288 MG/DL (ref 65–100)
GLUCOSE BLD STRIP.AUTO-MCNC: 361 MG/DL (ref 65–100)
GLUCOSE BLD STRIP.AUTO-MCNC: 461 MG/DL (ref 65–100)
GLUCOSE BLD STRIP.AUTO-MCNC: 483 MG/DL (ref 65–100)
GLUCOSE SERPL-MCNC: 235 MG/DL (ref 65–100)
POTASSIUM SERPL-SCNC: 4.7 MMOL/L (ref 3.5–5.1)
SERVICE CMNT-IMP: ABNORMAL
SODIUM SERPL-SCNC: 133 MMOL/L (ref 136–145)
TROPONIN I SERPL-MCNC: <0.05 NG/ML

## 2018-06-15 PROCEDURE — 82962 GLUCOSE BLOOD TEST: CPT

## 2018-06-15 PROCEDURE — 65270000032 HC RM SEMIPRIVATE

## 2018-06-15 PROCEDURE — 74011636637 HC RX REV CODE- 636/637: Performed by: FAMILY MEDICINE

## 2018-06-15 PROCEDURE — 77010033678 HC OXYGEN DAILY

## 2018-06-15 PROCEDURE — 74011250636 HC RX REV CODE- 250/636: Performed by: NURSE PRACTITIONER

## 2018-06-15 PROCEDURE — 93005 ELECTROCARDIOGRAM TRACING: CPT

## 2018-06-15 PROCEDURE — 74011250636 HC RX REV CODE- 250/636: Performed by: INTERNAL MEDICINE

## 2018-06-15 PROCEDURE — 93041 RHYTHM ECG TRACING: CPT

## 2018-06-15 PROCEDURE — 97161 PT EVAL LOW COMPLEX 20 MIN: CPT

## 2018-06-15 PROCEDURE — 74011250637 HC RX REV CODE- 250/637: Performed by: INTERNAL MEDICINE

## 2018-06-15 PROCEDURE — 74011636637 HC RX REV CODE- 636/637: Performed by: INTERNAL MEDICINE

## 2018-06-15 PROCEDURE — 74011250637 HC RX REV CODE- 250/637: Performed by: NURSE PRACTITIONER

## 2018-06-15 PROCEDURE — 97116 GAIT TRAINING THERAPY: CPT

## 2018-06-15 PROCEDURE — 5A09457 ASSISTANCE WITH RESPIRATORY VENTILATION, 24-96 CONSECUTIVE HOURS, CONTINUOUS POSITIVE AIRWAY PRESSURE: ICD-10-PCS | Performed by: INTERNAL MEDICINE

## 2018-06-15 PROCEDURE — 71045 X-RAY EXAM CHEST 1 VIEW: CPT

## 2018-06-15 PROCEDURE — 36415 COLL VENOUS BLD VENIPUNCTURE: CPT | Performed by: NURSE PRACTITIONER

## 2018-06-15 PROCEDURE — 94660 CPAP INITIATION&MGMT: CPT

## 2018-06-15 PROCEDURE — 97110 THERAPEUTIC EXERCISES: CPT

## 2018-06-15 PROCEDURE — 84484 ASSAY OF TROPONIN QUANT: CPT | Performed by: FAMILY MEDICINE

## 2018-06-15 PROCEDURE — 74011250636 HC RX REV CODE- 250/636: Performed by: FAMILY MEDICINE

## 2018-06-15 PROCEDURE — 80048 BASIC METABOLIC PNL TOTAL CA: CPT | Performed by: NURSE PRACTITIONER

## 2018-06-15 PROCEDURE — 65610000006 HC RM INTENSIVE CARE

## 2018-06-15 RX ORDER — PREDNISONE 20 MG/1
20 TABLET ORAL
Status: DISCONTINUED | OUTPATIENT
Start: 2018-06-15 | End: 2018-06-16

## 2018-06-15 RX ORDER — CARVEDILOL 6.25 MG/1
6.25 TABLET ORAL 2 TIMES DAILY WITH MEALS
Status: DISCONTINUED | OUTPATIENT
Start: 2018-06-15 | End: 2018-06-21 | Stop reason: HOSPADM

## 2018-06-15 RX ORDER — FUROSEMIDE 10 MG/ML
20 INJECTION INTRAMUSCULAR; INTRAVENOUS ONCE
Status: COMPLETED | OUTPATIENT
Start: 2018-06-16 | End: 2018-06-15

## 2018-06-15 RX ORDER — INSULIN LISPRO 100 [IU]/ML
6 INJECTION, SOLUTION INTRAVENOUS; SUBCUTANEOUS ONCE
Status: COMPLETED | OUTPATIENT
Start: 2018-06-15 | End: 2018-06-15

## 2018-06-15 RX ORDER — FUROSEMIDE 10 MG/ML
INJECTION INTRAMUSCULAR; INTRAVENOUS
Status: DISPENSED
Start: 2018-06-15 | End: 2018-06-16

## 2018-06-15 RX ORDER — POLYETHYLENE GLYCOL 3350 17 G/17G
17 POWDER, FOR SOLUTION ORAL DAILY
Status: DISCONTINUED | OUTPATIENT
Start: 2018-06-16 | End: 2018-06-21 | Stop reason: HOSPADM

## 2018-06-15 RX ORDER — FUROSEMIDE 10 MG/ML
40 INJECTION INTRAMUSCULAR; INTRAVENOUS ONCE
Status: COMPLETED | OUTPATIENT
Start: 2018-06-16 | End: 2018-06-15

## 2018-06-15 RX ADMIN — PREDNISONE 20 MG: 20 TABLET ORAL at 10:03

## 2018-06-15 RX ADMIN — HEPARIN SODIUM 5000 UNITS: 5000 INJECTION, SOLUTION INTRAVENOUS; SUBCUTANEOUS at 18:00

## 2018-06-15 RX ADMIN — ERYTHROPOIETIN 10000 UNITS: 10000 INJECTION, SOLUTION INTRAVENOUS; SUBCUTANEOUS at 18:10

## 2018-06-15 RX ADMIN — ACETAMINOPHEN 650 MG: 325 TABLET ORAL at 21:23

## 2018-06-15 RX ADMIN — ASPIRIN 81 MG: 81 TABLET, COATED ORAL at 08:59

## 2018-06-15 RX ADMIN — NITROGLYCERIN 0.4 MG: 0.4 TABLET SUBLINGUAL at 19:40

## 2018-06-15 RX ADMIN — INSULIN LISPRO 3 UNITS: 100 INJECTION, SOLUTION INTRAVENOUS; SUBCUTANEOUS at 07:22

## 2018-06-15 RX ADMIN — INSULIN LISPRO 6 UNITS: 100 INJECTION, SOLUTION INTRAVENOUS; SUBCUTANEOUS at 00:26

## 2018-06-15 RX ADMIN — Medication 10 ML: at 21:24

## 2018-06-15 RX ADMIN — INSULIN LISPRO 15 UNITS: 100 INJECTION, SOLUTION INTRAVENOUS; SUBCUTANEOUS at 17:56

## 2018-06-15 RX ADMIN — CLOPIDOGREL BISULFATE 75 MG: 75 TABLET ORAL at 08:59

## 2018-06-15 RX ADMIN — HEPARIN SODIUM 5000 UNITS: 5000 INJECTION, SOLUTION INTRAVENOUS; SUBCUTANEOUS at 04:29

## 2018-06-15 RX ADMIN — DOCUSATE SODIUM 100 MG: 100 CAPSULE, LIQUID FILLED ORAL at 09:01

## 2018-06-15 RX ADMIN — ANASTROZOLE 1 MG: 1 TABLET, COATED ORAL at 09:02

## 2018-06-15 RX ADMIN — Medication 1 CAPSULE: at 08:59

## 2018-06-15 RX ADMIN — CARVEDILOL 6.25 MG: 6.25 TABLET, FILM COATED ORAL at 17:59

## 2018-06-15 RX ADMIN — CHOLECALCIFEROL TAB 10 MCG (400 UNIT) 1000 UNITS: 10 TAB at 09:12

## 2018-06-15 RX ADMIN — Medication 1 TABLET: at 10:08

## 2018-06-15 RX ADMIN — Medication 10 ML: at 18:01

## 2018-06-15 RX ADMIN — INSULIN LISPRO 6 UNITS: 100 INJECTION, SOLUTION INTRAVENOUS; SUBCUTANEOUS at 22:47

## 2018-06-15 RX ADMIN — NITROGLYCERIN 0.4 MG: 0.4 TABLET SUBLINGUAL at 19:45

## 2018-06-15 RX ADMIN — LEVOTHYROXINE SODIUM 88 MCG: 88 TABLET ORAL at 07:34

## 2018-06-15 RX ADMIN — OXYCODONE HYDROCHLORIDE AND ACETAMINOPHEN 1000 MG: 500 TABLET ORAL at 09:00

## 2018-06-15 RX ADMIN — SODIUM CHLORIDE 250 ML: 900 INJECTION, SOLUTION INTRAVENOUS at 09:13

## 2018-06-15 RX ADMIN — DOCUSATE SODIUM 100 MG: 100 CAPSULE, LIQUID FILLED ORAL at 17:57

## 2018-06-15 RX ADMIN — FUROSEMIDE 20 MG: 10 INJECTION, SOLUTION INTRAMUSCULAR; INTRAVENOUS at 23:15

## 2018-06-15 RX ADMIN — FOLIC ACID 1 MG: 1 TABLET ORAL at 09:01

## 2018-06-15 RX ADMIN — HEPARIN SODIUM 5000 UNITS: 5000 INJECTION, SOLUTION INTRAVENOUS; SUBCUTANEOUS at 10:04

## 2018-06-15 RX ADMIN — INSULIN LISPRO 5 UNITS: 100 INJECTION, SOLUTION INTRAVENOUS; SUBCUTANEOUS at 11:30

## 2018-06-15 RX ADMIN — FUROSEMIDE 40 MG: 10 INJECTION, SOLUTION INTRAMUSCULAR; INTRAVENOUS at 23:46

## 2018-06-15 NOTE — PROGRESS NOTES
Problem: Mobility Impaired (Adult and Pediatric)  Goal: *Acute Goals and Plan of Care (Insert Text)  Physical Therapy Goals  Initiated 6/15/2018  1. Patient will move from supine to sit and sit to supine  in bed with independence within 7 day(s). 2.  Patient will transfer from bed to chair and chair to bed with modified independence using the least restrictive device within 7 day(s). 3.  Patient will perform sit to stand with modified independence within 7 day(s). 4.  Patient will ambulate with modified independence for 150 feet with the least restrictive device within 7 day(s). physical Therapy EVALUATION  Patient: Antony Faith (82 y.o. female)  Date: 6/15/2018  Primary Diagnosis: NSTEMI (non-ST elevated myocardial infarction) [facility])        Precautions:   Fall    ASSESSMENT :  Based on the objective data described below, the patient presents with impaired safety awareness, decreased balance, and endurance following admission for a NSTEMI and HF. Prior to admission this patient was modified independent with a rollator. She c/o gout 6/14 but had no complaint of pain today and no REDDY noted or reported by patient. Safety concerns during gait and mobility in the room d/t RW anterior to her base of support and diminished self step clearance. Recommend discharge to a rehab setting when medically stable. Patient will benefit from skilled intervention to address the above impairments.   Patients rehabilitation potential is considered to be Good  Factors which may influence rehabilitation potential include:   []         None noted  []         Mental ability/status  [x]         Medical condition  []         Home/family situation and support systems  []         Safety awareness  []         Pain tolerance/management  []         Other:      PLAN :  Recommendations and Planned Interventions:  [x]           Bed Mobility Training             [x]    Neuromuscular Re-Education  [x]           Transfer Training []    Orthotic/Prosthetic Training  [x]           Gait Training                         []    Modalities  [x]           Therapeutic Exercises           []    Edema Management/Control  [x]           Therapeutic Activities            []    Patient and Family Training/Education  []           Other (comment):    Frequency/Duration: Patient will be followed by physical therapy  4 times a week to address goals. Discharge Recommendations: Skilled Nursing Facility  Further Equipment Recommendations for Discharge: None     SUBJECTIVE:   Patient stated I haven't been up in 4-5 days. It is amazing what that will do to you.     OBJECTIVE DATA SUMMARY:   HISTORY:    Past Medical History:   Diagnosis Date    Anemia 9/2/2009    Colon cancer ([facility] 9/2/2009    surgery/chemo    Colon cancer ([facility] 9/2/2009    DM (diabetes mellitus) ([facility] 9/2/2009    GERD (gastroesophageal reflux disease)     H/O: CVA 9/2/2009    slight l sided weakness    Hypothyroid 9/2/2009    Ill-defined condition     seasonal allergies    Microalbuminuria 9/2/2009    Osteoporosis 9/2/2009    Other and unspecified hyperlipidemia 1/27/2010    Rheumatoid arthritis involving ankle ([facility] 9/28/2016    Rheumatoid arthritis(714.0) 9/2/2009    Unspecified essential hypertension 9/2/2009    Weakness due to cerebrovascular accident      Past Surgical History:   Procedure Laterality Date    HX COLECTOMY      HX HYSTERECTOMY      HX OTHER SURGICAL      colonoscopies numerous since 1993     Prior Level of Function/Home Situation: modified independent with rollator  Personal factors and/or comorbidities impacting plan of care: metastatic disease    Home Situation  Home Environment: Rehabilitation facility  One/Two Story Residence: One story  Living Alone: No  Support Systems: Family member(s)  Patient Expects to be Discharged to[de-identified] Rehabilitation facility  Current DME Used/Available at Home: Abrahan Lyman, rollator  Tub or Shower Type: Shower    EXAMINATION/PRESENTATION/DECISION MAKING:   Critical Behavior:  Neurologic State: Alert  Orientation Level: Oriented X4  Cognition: Follows commands  Safety/Judgement: Awareness of environment, Fall prevention  Hearing: Auditory  Auditory Impairment: None  Skin:    Edema:   Range Of Motion:  AROM: Generally decreased, functional                       Strength:    Strength: Generally decreased, functional                    Tone & Sensation:   Tone: Normal              Sensation: Intact               Coordination:  Coordination: Generally decreased, functional    Functional Mobility:  Bed Mobility:     Supine to Sit: Minimum assistance     Scooting: Stand-by assistance  Transfers:  Sit to Stand: Contact guard assistance  Stand to Sit: Contact guard assistance                       Balance:   Sitting: Intact; Without support  Standing: Impaired; With support  Standing - Static: Good  Standing - Dynamic : Fair  Ambulation/Gait Training:  Distance (ft): 40 Feet (ft)  Assistive Device: Gait belt;Walker, rolling  Ambulation - Level of Assistance: Minimal assistance     Gait Description (WDL): Exceptions to WDL  Gait Abnormalities: Decreased step clearance;Trunk sway increased        Base of Support: Narrowed     Speed/Haylee: Slow;Shuffled                        Stairs:               Therapeutic Exercises:       Functional Measure:  Tinetti test:    Sitting Balance: 1  Arises: 1  Attempts to Rise: 2  Immediate Standing Balance: 1  Standing Balance: 1  Nudged: 1  Eyes Closed: 1  Turn 360 Degrees - Continuous/Discontinuous: 0  Turn 360 Degrees - Steady/Unsteady: 0  Sitting Down: 1  Balance Score: 9  Indication of Gait: 1  R Step Length/Height: 1  L Step Length/Height: 1  R Foot Clearance: 1  L Foot Clearance: 1  Step Symmetry: 0  Step Continuity: 1  Path: 1  Trunk: 0  Walking Time: 0  Gait Score: 7  Total Score: 16       Tinetti Test and G-code impairment scale:  Percentage of Impairment CH    0%   CI    1-19% CJ    20-39% CK    40-59% CL    60-79% CM    80-99% CN     100%   Tinetti  Score 0-28 28 23-27 17-22 12-16 6-11 1-5 0       Tinetti Tool Score Risk of Falls  <19 = High Fall Risk  19-24 = Moderate Fall Risk  25-28 = Low Fall Risk  Tinetti ME. Performance-Oriented Assessment of Mobility Problems in Elderly Patients. [facility] 66; [id number]. (Scoring Description: PT Bulletin Feb. 10, 1993)    Older adults: Lisseth Leonard et al, 2009; n = 1000 [facility] elderly evaluated with ABC, SHANNON, ADL, and IADL)  · Mean SHANNON score for males aged 69-68 years = 26.21(3.40)  · Mean SHANNON score for females age 69-68 years = 25.16(4.30)  · Mean SHANNON score for males over 80 years = 23.29(6.02)  · Mean SHANNON score for females over 80 years = 17.20(8.32)         G codes: In compliance with CMSs Claims Based Outcome Reporting, the following G-code set was chosen for this patient based on their primary functional limitation being treated: The outcome measure chosen to determine the severity of the functional limitation was the Tinetti with a score of 16/28 which was correlated with the impairment scale.     ? Mobility - Walking and Moving Around:     - CURRENT STATUS: CK - 40%-59% impaired, limited or restricted    - GOAL STATUS: CJ - 20%-39% impaired, limited or restricted    - D/C STATUS:  ---------------To be determined---------------      Physical Therapy Evaluation Charge Determination   History Examination Presentation Decision-Making   MEDIUM  Complexity : 1-2 comorbidities / personal factors will impact the outcome/ POC  LOW Complexity : 1-2 Standardized tests and measures addressing body structure, function, activity limitation and / or participation in recreation  LOW Complexity : Stable, uncomplicated  LOW Complexity : FOTO score of       Based on the above components, the patient evaluation is determined to be of the following complexity level: LOW     Pain:  Pain Scale 1: Numeric (0 - 10)  Pain Intensity 1: 0 After treatment:   [x]         Patient left in no apparent distress sitting up in chair  []         Patient left in no apparent distress in bed  [x]         Call bell left within reach  [x]         Nursing notified  [x]         Caregiver present  []         Bed alarm activated    COMMUNICATION/EDUCATION:   The patients plan of care was discussed with: Registered Nurse. [x]         Fall prevention education was provided and the patient/caregiver indicated understanding. [x]         Patient/family have participated as able in goal setting and plan of care. [x]         Patient/family agree to work toward stated goals and plan of care. []         Patient understands intent and goals of therapy, but is neutral about his/her participation. []         Patient is unable to participate in goal setting and plan of care.     Thank you for this referral.  Godfrey Gill, PT, DPT   Time Calculation: 26 mins

## 2018-06-15 NOTE — PROGRESS NOTES
Hospitalist Progress Note  Juan Luis Lemons MD  Answering service: [phone] from in house phone         Date of Service:  6/15/2018  NAME:  Darrick England  :  1935  MRN:  [id number]      Admission Summary:   CHIEF COMPLAINT:  Shortness of breath.       HISTORY OF PRESENT ILLNESS:  This is an 42-year-old woman with a past medical history significant for coronary artery disease, chronic systolic congestive heart failure, metastatic left breast cancer, hypertension, type 2 diabetes, hypothyroidism, dyslipidemia, rheumatoid arthritis, chronic kidney disease who was in her usual state of health until the day of her presentation at the emergency room when the patient developed shortness of breath at the rehab facility where the patient is receiving rehabilitation. The patient has had 4 myocardial infarctions since April of this year, each episode presenting with shortness of breath. The patient's son was present at [facility] where the patient is receiving rehabilitation when she developed shortness of breath. She received 3 doses of nitroglycerin, and he advised the facility to send the patient to the emergency room for further evaluation. It was reported that the patient's oxygen saturation was 87%. The patient has had multiple admissions to this hospital for evaluation and treatment of a non-ST elevation myocardial infarction. The last admission to this hospital was from 2018 to 2018. The patient was admitted and treated for non-ST elevation myocardial cardiac infarction. Since April, the patient has been having recurrent myocardial infarctions. She has had a cardiac catheterization that shows a significant lesion, but patient is not a candidate for surgical intervention or PCI/stent placement. When the patient arrived at the emergency room, she was found to have an elevated troponin level.   The patient was referred to the hospitalist service for evaluation for admission. No history of fever, no rigors and no chills. Interval history / Subjective:       6/13:  Usual breast cancer related lt lat chest pain, pt usually up walking at rehab per her report, will obtain 6 min walk 02 test.     1/14:  Slight bump in Cr. Pt is likely to go hospice soon  dispo to accepting snf/rehab today , can f/u on Cr and dose lasix accordingly on discharge with close f/u lab. If here in am (likely) will repeat the Cr /K ,/Na ( now 133) and go from there, agree no lasix today. 6/15:  Gouty presentation rt wrist resolved, pred 20 for now daily,    Cr up a bit,  Bolus 250 ns and hold diuretic, and f/u alb  Case advised best dc date 6/16 + and pt needs to participate with PT ,          Assessment & Plan:     NSTMI. Stable,  Elevated troponin/CAD: severe multivessel disease  -Troponin 0.16 (less than last admission- was 1.44)  -12 lead EKG:  ST, lateral ST/T wave abnormality, not significantly changed from 6/3  -Chest pain resolved   -Not candidate for CABG or intervention.  -Continue ASA, plavix, BB, low dose ace-Iand repatha (if family able to bring in). No statin due to intolerance in past.  -Will add Imdur 30 mg daily   on coreg  But   Lisinopril held 2n2 to renal issues  Same 6/15/2018     Acute on chronic syst HF  NYHA IV EF 30 % NWMA,( reduced form 40 % on [facility] 5/21)   Left ventricle: Systolic function was moderately to markedly reduced. Ejection fraction was estimated to be 30 %. No obvious wall motion  abnormalities identified in the views obtained. Probable gout rt wrist/hand  prednison 40 mg x 1 , monitor. F/u on taper dose prednisone on discharge. ( one dose naprosyn 6/14/2018  , will add prn norco as well on discharge) f/u with rhematology as needed as an out pt. For pred 20 mg daily for now, and taper soon. ? On discharge.      CKD 3  Lab Results   Component Value Date/Time    Creatinine 1.83 (H) 06/15/2018 04:27 AM holding diuretic and f/u lab and giving 250 cc bolus      HTN, on coreg  But   Lisinopril held 2n2 to renal issues. BP Readings from Last 1 Encounters:   06/15/18 115/61        Anemia, chronic   Lab Results   Component Value Date/Time    HGB 8.3 (L) 06/14/2018 06:59 AM        Metastatic breast cancer, crurent and colon cancer followed by hem/onc    Code status: DNR  DVT prophylaxis: heparin     Care Plan discussed with: Patient/Family and Nurse  Disposition: SNF/LTC and TBD discussing with cm and son( to call after pt/ot eval )     Hospital Problems  Date Reviewed: 6/12/2018          Codes Class Noted POA    * (Principal)NSTEMI (non-ST elevated myocardial infarction) ([facility] [address] ICD-10-CM: I21.4  ICD-9-CM: 410.70  6/1/2018 Yes                Review of Systems:   A comprehensive review of systems was negative. resolved pain/swelling rt wrist.       Vital Signs:    Last 24hrs VS reviewed since prior progress note. Most recent are:  Visit Vitals    /61 (BP 1 Location: Right arm, BP Patient Position: Head of bed elevated (Comment degrees))    Pulse 73    Temp 97.5 °F (36.4 °C)    Resp 18    Ht 5' 1\" (1.549 m)    Wt 56 kg (123 lb 7.3 oz)    SpO2 97%    BMI 23.33 kg/m2         Intake/Output Summary (Last 24 hours) at 06/15/18 0934  Last data filed at 06/15/18 9900   Gross per 24 hour   Intake                0 ml   Output              600 ml   Net             -600 ml        Physical Examination:             Constitutional:  No acute distress, cooperative, pleasant    ENT:  Oral mucous moist, oropharynx benign. Neck supple,    Resp:  CTA bilaterally. No wheezing/rhonchi/rales. No accessory muscle use   CV:  Regular rhythm, normal rate, no definite  gallops, rubs    GI:  Soft, non distended, non tender. normoactive bowel sounds, no hepatosplenomegaly     Musculoskeletal:  No edema, warm, 1+ pulses throughout    Neurologic:  Moves all extremities.   AAOx3, CN II-XII reviewed     Psych:  Good insight, Not anxious nor agitated. Skin:  Good turgor, no rashes or ulcers       Data Review:    Review and/or order of clinical lab test      Labs:     Recent Labs      06/14/18   0659  06/13/18   0200   WBC  6.7  6.6   HGB  8.3*  8.2*   HCT  27.0*  26.4*   PLT  254  277     Recent Labs      06/15/18   0427  06/14/18   0659  06/13/18   0200   NA  133*  133*  137   K  4.7  4.6  4.7   CL  101  102  102   CO2  23  24  26   BUN  66*  51*  45*   CREA  1.83*  1.40*  1.17*   GLU  235*  180*  180*   CA  8.9  8.6  8.1*   MG   --   1.9   --      Recent Labs      06/13/18   0200   SGOT  23   ALT  19   AP  602*   TBILI  0.3   TP  6.8   ALB  2.3*   GLOB  4.5*     Recent Labs      06/13/18   0200  06/12/18   1758  06/12/18   1023   APTT  56.7*  48.9*  58.6*      No results for input(s): FE, TIBC, PSAT, FERR in the last 72 hours. No results found for: FOL, RBCF   No results for input(s): PH, PCO2, PO2 in the last 72 hours.   Recent Labs      06/12/18 1758   CPK  18*   CKNDX  Cannot be calculated     Lab Results   Component Value Date/Time    Cholesterol, total 74 04/16/2018 04:21 AM    HDL Cholesterol 25 04/16/2018 04:21 AM    LDL, calculated 31.6 04/16/2018 04:21 AM    Triglyceride 87 04/16/2018 04:21 AM    CHOL/HDL Ratio 3.0 04/16/2018 04:21 AM     Lab Results   Component Value Date/Time    Glucose (POC) 217 (H) 06/15/2018 06:26 AM    Glucose (POC) 288 (H) 06/15/2018 02:57 AM    Glucose (POC) 361 (H) 06/15/2018 12:25 AM    Glucose (POC) 415 (H) 06/14/2018 10:42 PM    Glucose (POC) 448 (H) 06/14/2018 09:24 PM     Lab Results   Component Value Date/Time    Color YELLOW/STRAW 06/12/2018 02:17 PM    Appearance CLOUDY (A) 06/12/2018 02:17 PM    Specific gravity 1.008 06/12/2018 02:17 PM    pH (UA) 5.5 06/12/2018 02:17 PM    Protein NEGATIVE  06/12/2018 02:17 PM    Glucose NEGATIVE  06/12/2018 02:17 PM    Ketone NEGATIVE  06/12/2018 02:17 PM    Bilirubin NEGATIVE  06/12/2018 02:17 PM    Urobilinogen 0.2 06/12/2018 02:17 PM    Nitrites NEGATIVE 06/12/2018 02:17 PM    Leukocyte Esterase LARGE (A) 06/12/2018 02:17 PM    Epithelial cells FEW 06/12/2018 02:17 PM    Bacteria NEGATIVE  06/12/2018 02:17 PM    WBC 20-50 06/12/2018 02:17 PM    RBC 0-5 06/12/2018 02:17 PM         Medications Reviewed:     Current Facility-Administered Medications   Medication Dose Route Frequency    carvedilol (COREG) tablet 6.25 mg  6.25 mg Oral BID WITH MEALS    sodium chloride 0.9 % bolus infusion 250 mL  250 mL IntraVENous ONCE    predniSONE (DELTASONE) tablet 20 mg  20 mg Oral DAILY WITH BREAKFAST    heparin (porcine) injection 5,000 Units  5,000 Units SubCUTAneous Q8H    insulin lispro (HUMALOG) injection   SubCUTAneous AC&HS    glucose chewable tablet 16 g  4 Tab Oral PRN    dextrose (D50W) injection syrg 12.5-25 g  12.5-25 g IntraVENous PRN    glucagon (GLUCAGEN) injection 1 mg  1 mg IntraMUSCular PRN    anastrozole (ARIMIDEX) tablet 1 mg  1 mg Oral DAILY    ascorbic acid (vitamin C) (VITAMIN C) tablet 1,000 mg  1,000 mg Oral DAILY    aspirin delayed-release tablet 81 mg  81 mg Oral DAILY    cholecalciferol (VITAMIN D3) tablet 1,000 Units  1,000 Units Oral DAILY    clopidogrel (PLAVIX) tablet 75 mg  75 mg Oral DAILY    docusate sodium (COLACE) capsule 100 mg  100 mg Oral BID    epoetin celio (EPOGEN;PROCRIT) injection 10,000 Units  10,000 Units SubCUTAneous Q MON, WED & FRI    folic acid (FOLVITE) tablet 1 mg  1 mg Oral DAILY    lactobac ac& pc-s.therm-b.anim (BERYL Q/RISAQUAD)  1 Cap Oral DAILY    levothyroxine (SYNTHROID) tablet 88 mcg  88 mcg Oral ACB    nitroglycerin (NITROSTAT) tablet 0.4 mg  0.4 mg SubLINGual PRN    sodium chloride (NS) flush 5-10 mL  5-10 mL IntraVENous Q8H    sodium chloride (NS) flush 5-10 mL  5-10 mL IntraVENous PRN    acetaminophen (TYLENOL) tablet 650 mg  650 mg Oral Q4H PRN    ondansetron (ZOFRAN) injection 4 mg  4 mg IntraVENous Q4H PRN    evolocumab (REPATHA) syringe 140 mg (Patient Supplied)  140 mg SubCUTAneous Q 14 DAYS    teriparatide (FORTEO) injection 20 mcg (Patient Supplied)  20 mcg SubCUTAneous DAILY    palbociclib cap 125 mg (Patient Supplied)  125 mg Oral DAILY WITH LUNCH    b-complex with vitamin c tablet 1 Tab (Patient Supplied)  1 Tab Oral DAILY     ______________________________________________________________________  EXPECTED LENGTH OF STAY: 4d 7h  ACTUAL LENGTH OF STAY:          3                 Raven Garcia MD

## 2018-06-15 NOTE — PROGRESS NOTES
Bedside shift change report given to Brittany (oncoming nurse) by Jackquelyn Phoenix (offgoing nurse). Report included the following information SBAR, Intake/Output and MAR.

## 2018-06-15 NOTE — PROGRESS NOTES
2613 Patient requested consult called to Dr. Markos Johnson at [phone]. Patient was supposed to see MD for outpatient visit today. Office notified this RN that Dr. Markos Johnson will evaluate seeing this patient today and office will notify us of decision. 1000 Patient will be seen today by Dr. Leslie Hernandez from oncology on call for Dr. Markos Johnson for consults.

## 2018-06-15 NOTE — PROGRESS NOTES
Renal Dosing/Monitoring  Medication: Palbociclib   Current regimen:  125 mg daily with lunch  Recent Labs      06/15/18   0427  06/14/18   0659  06/13/18   0200   CREA  1.83*  1.40*  1.17*   BUN  66*  51*  45*     Estimated CrCl:  18.2 ml/min  Plan: Continue current regimen for CrCl > 15 ml/min. No dose adjustments for renal impairment (CrCl < 15 ml/min/HD) per 's labeling.     Dia Lancaster, PharmD

## 2018-06-15 NOTE — DIABETES MGMT
DTC Progress Note    Recommendations/ Comments: Chart reviewed due to hyperglycemia.  mg/dL today. Yesterday BG's spiked to > 400 mg/dl likely due to Prednisone. Noted Prednisone 40 mg at breakfast started yesterday and will taper to 20 mg today       If appropriate, please consider   additon of Lantus 10 units while on steroids and  starting home dose of Amaryl 1mg and Januvia 25 mg. Current hospital DM medication: correction scale Humalog, normal sensitivity. Chart reviewed on [facility]. Patient is a 82 y.o. female with known diabetes on Amaryl 1 mg and Januvia 50 mg at home. A1c:   Lab Results   Component Value Date/Time    Hemoglobin A1c 7.0 (H) 04/18/2018 08:14 AM    Hemoglobin A1c 6.9 (H) 11/30/2011 08:30 AM       Recent Glucose Results:   Lab Results   Component Value Date/Time     (H) 06/15/2018 04:27 AM    GLUCPOC 217 (H) 06/15/2018 06:26 AM    GLUCPOC 288 (H) 06/15/2018 02:57 AM    GLUCPOC 361 (H) 06/15/2018 12:25 AM        Lab Results   Component Value Date/Time    Creatinine 1.83 (H) 06/15/2018 04:27 AM     Estimated Creatinine Clearance: 18.2 mL/min (based on Cr of 1.83). Active Orders   Diet    DIET DIABETIC CONSISTENT CARB Regular; 2 GM NA (House Low NA)        PO intake:   Patient Vitals for the past 72 hrs:   % Diet Eaten   06/13/18 1128 50 %   06/13/18 0757 50 %   06/12/18 1528 50 %       Will continue to follow as needed.     Thank you  Valerie Hollins RN, CDE

## 2018-06-15 NOTE — PROGRESS NOTES
Bedside and Verbal shift change report given to [id number] (oncoming nurse) by Eli Erickson (offgoing nurse). Report included the following information SBAR, Kardex, Procedure Summary, Intake/Output, MAR, Accordion and Recent Results.

## 2018-06-15 NOTE — PROGRESS NOTES
Problem: Self Care Deficits Care Plan (Adult)  Goal: *Acute Goals and Plan of Care (Insert Text)  Occupational Therapy Goals  Initiated 6/13/2018  1. Patient will perform grooming at the sink with supervision/set-up within 7 day(s). 2.  Patient will perform lower body dressing with supervision within 7 day(s). 3.  Patient will perform bathing with supervision/set-up within 7 day(s). 4.  Patient will perform toilet transfers with supervision/set-up and least restrictive DME within 7 day(s). 5.  Patient will perform all aspects of toileting with supervision/set-up within 7 day(s). 6.  Patient will participate in upper extremity therapeutic exercise/activities with supervision/set-up for 10 minutes within 7 day(s). 7.  Patient will utilize energy conservation techniques during functional activities with verbal cues within 7 day(s). Occupational Therapy TREATMENT  Patient: Kelby Werner (6 y.o. female)  Date: 6/15/2018  Diagnosis: NSTEMI (non-ST elevated myocardial infarction) [facility]) NSTEMI (non-ST elevated myocardial infarction) [facility])       Precautions: Fall  Chart, occupational therapy assessment, plan of care, and goals were reviewed. ASSESSMENT:  Patient cleared by RN to be seen for OT session, patient received in chair and agreeable to treatment. Patient caregiver present during session to assist with reminders for BUE exercises. Patient declined to complete any ADLs or functional mobility, willing to complete BUE exercises. Noted patient LUE weaker than RUE 2* hx of CVA. Patient educated on importance of completing ADLs and exercises in order to maintain strength and function. Exercises written on white board in order to assist with memory and compliance. Patient continues to be limited by impaired ROM, decreased strength, balance deficits, decreased functional activity tolerance, and self limiting behavior. Patient would benefit from rehab in order to address the above deficits.      Recommend with nursing patient to complete as able in order to maintain strength, endurance and independence: ADLs with supervision/setup, OOB to chair 3x/day and mobilizing to the bathroom for toileting with 1 assist. Thank you for your assistance. Progression toward goals:  []       Improving appropriately and progressing toward goals  [x]       Improving slowly and progressing toward goals  []       Not making progress toward goals and plan of care will be adjusted     PLAN:  Patient continues to benefit from skilled intervention to address the above impairments. Continue treatment per established plan of care. Discharge Recommendations:  Rehab  Further Equipment Recommendations for Discharge:  TBD     SUBJECTIVE:   Patient stated I want to let out a little secret, I have two care aides who help with and do all of my washing and bathing for me.     OBJECTIVE DATA SUMMARY:   Cognitive/Behavioral Status:  Neurologic State: Alert  Orientation Level: Oriented X4  Cognition: Follows commands  Perception: Appears intact  Perseveration: No perseveration noted  Safety/Judgement: Awareness of environment; Fall prevention    Functional Mobility and Transfers for ADLs:  Bed Mobility:  Supine to Sit: Minimum assistance  Scooting: Stand-by assistance    Transfers:  Sit to Stand: Contact guard assistance    Balance:  Sitting: Intact; Without support  Standing: Impaired; With support  Standing - Static: Good  Standing - Dynamic : Fair    ADL Intervention:  Patient educated on role of OT and importance of completing ADLs as IND as possible. Patient verbalizing understanding but continuing to refuse. Cognitive Retraining  Safety/Judgement: Awareness of environment; Fall prevention    Therapeutic Exercises:    - patient completed 1x10 of each exercise with red theraband: shoulder horizontal abd/add, tricep extension, shoulder flexion/extension    Pain:  Pain Scale 1: Numeric (0 - 10)  Pain Intensity 1: 0    Activity Tolerance:   Fair, VSS  Please refer to the flowsheet for vital signs taken during this treatment.   After treatment:   [x] Patient left in no apparent distress sitting up in chair  [] Patient left in no apparent distress in bed  [x] Call bell left within reach  [x] Nursing notified  [x] Caregiver present  [] Bed alarm activated    COMMUNICATION/COLLABORATION:   The patients plan of care was discussed with: Physical Therapist and Registered Nurse    Avila Townsend OT  Time Calculation: 15 mins

## 2018-06-15 NOTE — PROGRESS NOTES
CM s/w hospitalist Dr. Mauricio Bowman, hospitalist expects patient medically clear for dsicahrge to SNF tomorrow (Saturday 6/16/18). Patient transfers and ambulates short distahces with CGA/minimal assist. CM s/w patient's son Calvert Boeck confirms that he can provide discharge transport. Jules Toro will also continue to provide CA meds as he has been doing at [facility] and [address] [place] [address]. CM s/w Anayeli Agrawal, confirmed facility has auth and can admit patient at any time. Tanja confirms that [address] will expect patient's arrival 1000hrs Saturday. Pt's son Jules Toro [phone] to provide discharge transport 1000/1100hrs Saturday morning. TO COMPLETE DISCHARGE Saturday 6/16/18, 5E RN PLEASE:  1. Call report: [phone]  2.  Add to Discharge Envleope (on chart)   __ MAR   __ Kardex   __ Discharge Orders   __ Signed hard-copy prescriptions (if any narcotics/controlled substances)

## 2018-06-15 NOTE — CONSULTS
Hematology Oncology Consultation    Reason for consult: met breast ca on Tx , h/o colon ca    Primary oncologist: Dr Caroline Fajardo    Admitting Diagnosis: NSTEMI (non-ST elevated myocardial infarction) [facility])    HPI:   Ninfa Tolliver is a  82y.o. year old seen in consultation at the request of Dr. Lenka Gardner and pt son for met breast ca- on arimidex X 7 weeks and recently started on Ibrance-palbociclib  On 6/11/2018 . She has been readmitted from Rehab for sob on exertion, with elev Trop ( better than previous admit)- known to cardiology with cad, chf, multivessel CAD- not a surgical candidate. They had an appt with Dr Caroline Fajardo today at office. No fevers, diarrhea or mouth sores, remains weak, was only walking a bit at Rehab,was tiring her out- in and out of [facility] with chf/recent NSTEMI. Recommendations:   *)Met breast ca- ER+ - Known to Dr. Caroline Fajardo. Prev h/o colon ca   -Continue arimidex daily  -Cont Ibrance 125 mg po once daily - Initiated c1d1 6/11/2015 - too early to assess response or toxicity. Her cardiac issues have been ongoing for a long time. CA 27-29 went from 226 ( April 2018) to 285 ( June 1, 2018) - but would not make much of that rise , as the Paula [place] has just been started and not long enough trial of the AI either.  -would repeat her cbc in 1 week at SNF/Rehab and have them faxed to /I office   -Expect neutropenia , but none currently, afebrile  - Has JEROD - will check with pharmacy if palbociclib needs dose adjustment( but probably not)  -Spoke with son -Aja Yesenia at length- they are not currently interested in Hospice . They do understand that her breast cancer in incurable, but are hopeful that treatment will extend survival.  -check a cbc in am here. *)CHF/CAD -Per Dr Lenka Gardner and Cardiology. *)Dispo - To SNF possibly     They have an appt already in place to see Dr. Caroline Fajardo in 2 weeks - I offered a sooner appt to son and he wishes to keep it at 2 weeks.     Over weekend please call [facility] - on call MD with any questions.     D/w pt and son and Dr Jaime Corrigan    Greatly appreciate consult    Imaging:    Reviewed    Labs:    Recent Results (from the past 24 hour(s))   GLUCOSE, POC    Collection Time: 06/14/18 12:14 PM   Result Value Ref Range    Glucose (POC) 272 (H) 65 - 100 mg/dL    Performed by Genaro Perez    GLUCOSE, POC    Collection Time: 06/14/18  5:56 PM   Result Value Ref Range    Glucose (POC) 403 (H) 65 - 100 mg/dL    Performed by Tremayne Fuentes    GLUCOSE, POC    Collection Time: 06/14/18  9:24 PM   Result Value Ref Range    Glucose (POC) 448 (H) 65 - 100 mg/dL    Performed by Itzel Hanson    GLUCOSE, POC    Collection Time: 06/14/18 10:42 PM   Result Value Ref Range    Glucose (POC) 415 (H) 65 - 100 mg/dL    Performed by Tremayne Fuentes    GLUCOSE, POC    Collection Time: 06/15/18 12:25 AM   Result Value Ref Range    Glucose (POC) 361 (H) 65 - 100 mg/dL    Performed by Ellie Tiwari    GLUCOSE, POC    Collection Time: 06/15/18  2:57 AM   Result Value Ref Range    Glucose (POC) 288 (H) 65 - 100 mg/dL    Performed by Scottie Soto    METABOLIC PANEL, BASIC    Collection Time: 06/15/18  4:27 AM   Result Value Ref Range    Sodium 133 (L) 136 - 145 mmol/L    Potassium 4.7 3.5 - 5.1 mmol/L    Chloride 101 97 - 108 mmol/L    CO2 23 21 - 32 mmol/L    Anion gap 9 5 - 15 mmol/L    Glucose 235 (H) 65 - 100 mg/dL    BUN 66 (H) 6 - 20 MG/DL    Creatinine 1.83 (H) 0.55 - 1.02 MG/DL    BUN/Creatinine ratio 36 (H) 12 - 20      GFR est AA 32 (L) >60 ml/min/1.73m2    GFR est non-AA 26 (L) >60 ml/min/1.73m2    Calcium 8.9 8.5 - 10.1 MG/DL   GLUCOSE, POC    Collection Time: 06/15/18  6:26 AM   Result Value Ref Range    Glucose (POC) 217 (H) 65 - 100 mg/dL    Performed by Ellie Tiwari        History:  Past Medical History:   Diagnosis Date    Anemia 9/2/2009    Colon cancer ([facility] 9/2/2009    surgery/chemo    Colon cancer ([facility] 9/2/2009    DM (diabetes mellitus) ([place] [id number].) 9/2/2009    GERD (gastroesophageal reflux disease)     H/O: CVA 9/2/2009    slight l sided weakness    Hypothyroid 9/2/2009    Ill-defined condition     seasonal allergies    Microalbuminuria 9/2/2009    Osteoporosis 9/2/2009    Other and unspecified hyperlipidemia 1/27/2010    Rheumatoid arthritis involving ankle ([address] 9/28/2016    Rheumatoid arthritis(714.0) 9/2/2009    Unspecified essential hypertension 9/2/2009    Weakness due to cerebrovascular accident       Past Surgical History:   Procedure Laterality Date    HX COLECTOMY      HX HYSTERECTOMY      HX OTHER SURGICAL      colonoscopies numerous since 1993      Prior to Admission medications    Medication Sig Start Date End Date Taking? Authorizing Provider   FORTEO 20 mcg/dose - 600 mcg/2.4 mL pnij injection INJECT 20MCG ONCE DAILY 5/30/18  Yes Brenden Lane MD   evolocumab MAIN LINE [facility]) pen injection 1 mL by SubCUTAneous route every fourteen (14) days. 1/11/18  Yes Keshawn Purcell MD   nitroglycerin (NITROSTAT) 0.4 mg SL tablet 1 Tab by SubLINGual route as needed for Chest Pain. Up to 3 doses. 6/6/18   Avila Rutledge MD   clopidogrel (PLAVIX) 75 mg tab Take 1 Tab by mouth daily. 5/25/18   Cedric Oakes MD   carvedilol (COREG) 6.25 mg tablet Take 1 Tab by mouth two (2) times daily (with meals). 5/11/18   Rupesh Kerns MD   anastrozole (ARIMIDEX) 1 mg tablet Take 1 Tab by mouth daily. 5/12/18   Rupesh Kerns MD   docusate sodium (COLACE) 100 mg capsule Take 1 Cap by mouth two (2) times a day for 90 days. 5/11/18 8/9/18  Rupesh Kerns MD   furosemide (LASIX) 20 mg tablet Take one pill daily by mouth . 5/12/18   Rupesh Kerns MD   L. acidoph & paracasei- S therm- Bifido (BERYL-Q/RISAQUAD) 8 billion cell cap cap Take 1 Cap by mouth daily. 5/12/18   Rupesh Kerns MD   polyethylene glycol (MIRALAX) 17 gram packet Take 1 Packet by mouth daily.  5/12/18   Rupesh Kerns MD   phosphorus (K PHOS NEUTRAL) 250 mg tablet Take 1 Tab by mouth two (2) times a day. 5/11/18   Edwin Pretty MD   lisinopril (PRINIVIL, ZESTRIL) 2.5 mg tablet Take 1 Tab by mouth daily. 5/12/18   Edwni Pretty MD   epoetin celio (EPOGEN;PROCRIT) 10,000 unit/mL injection 1 mL by SubCUTAneous route every Monday, Wednesday, Friday. Indications: ANEMIA DUE TO RENAL FAILURE 5/11/18   Edwin Pretty MD   glimepiride (AMARYL) 1 mg tablet Take 1 mg by mouth every morning. Valentino Perkins MD   levothyroxine (SYNTHROID) 88 mcg tablet Take 88 mcg by mouth Daily (before breakfast). Historical Provider   etanercept (ENBREL) 50 mg/mL (0.98 mL) injection 1 mL by SubCUTAneous route every seven (7) days. 3/16/18   Hugh Wu MD   folic acid (FOLVITE) 1 mg tablet TAKE ONE TABLET BY MOUTH DAILY 10/9/17   Hugh Wu MD   sitaGLIPtin (JANUVIA) 25 mg tablet Take 50 mg by mouth daily. Historical Provider   VIT C/VIT E/LUTEIN/MIN/OMEGA-3 (OCUVITE PO) Take 1 Tab by mouth daily. Historical Provider   MAGNESIUM MALATE Take 400 mg by mouth daily. Historical Provider   MULTIVITAMIN WITH MINERALS (HAIR,SKIN & NAILS PO) Take 1 Tab by mouth daily. Historical Provider   aspirin delayed-release 81 mg tablet Take 81 mg by mouth daily. Historical Provider   OMEGA-3 FATTY ACIDS/FISH OIL (OMEGA 3 FISH OIL PO) Take 300 mg by mouth daily. Historical Provider   ascorbate calcium (MAKAYLA-C) 500 mg Tab Take 1,000 mg by mouth daily. 4/15/11   Historical Provider   CHOLECALCIFEROL, VITAMIN D3, (VITAMIN D-3 PO) Take 1,000 Units by mouth daily.     Historical Provider     Allergies   Allergen Reactions    Statins-Hmg-Coa Reductase Inhibitors Other (comments)     Intolerant to statins     Sulfa (Sulfonamide Antibiotics) Other (comments)     syncope      Social History   Substance Use Topics    Smoking status: Never Smoker    Smokeless tobacco: Never Used    Alcohol use No      Family History   Problem Relation Age of Onset    Diabetes Mother     Stroke Mother     Diabetes Sister     Other Sister      fell and hit her head -  of this    Diabetes Brother     Cancer Father      stomach    Diabetes Sister         Current Medications:  Current Facility-Administered Medications   Medication Dose Route Frequency    carvedilol (COREG) tablet 6.25 mg  6.25 mg Oral BID WITH MEALS    sodium chloride 0.9 % bolus infusion 250 mL  250 mL IntraVENous ONCE    predniSONE (DELTASONE) tablet 20 mg  20 mg Oral DAILY WITH BREAKFAST    heparin (porcine) injection 5,000 Units  5,000 Units SubCUTAneous Q8H    insulin lispro (HUMALOG) injection   SubCUTAneous AC&HS    glucose chewable tablet 16 g  4 Tab Oral PRN    dextrose (D50W) injection syrg 12.5-25 g  12.5-25 g IntraVENous PRN    glucagon (GLUCAGEN) injection 1 mg  1 mg IntraMUSCular PRN    anastrozole (ARIMIDEX) tablet 1 mg  1 mg Oral DAILY    ascorbic acid (vitamin C) (VITAMIN C) tablet 1,000 mg  1,000 mg Oral DAILY    aspirin delayed-release tablet 81 mg  81 mg Oral DAILY    cholecalciferol (VITAMIN D3) tablet 1,000 Units  1,000 Units Oral DAILY    clopidogrel (PLAVIX) tablet 75 mg  75 mg Oral DAILY    docusate sodium (COLACE) capsule 100 mg  100 mg Oral BID    epoetin celio (EPOGEN;PROCRIT) injection 10,000 Units  10,000 Units SubCUTAneous Q MON, WED & FRI    folic acid (FOLVITE) tablet 1 mg  1 mg Oral DAILY    lactobac ac& pc-s.therm-b.anim (BERYL Q/RISAQUAD)  1 Cap Oral DAILY    levothyroxine (SYNTHROID) tablet 88 mcg  88 mcg Oral ACB    nitroglycerin (NITROSTAT) tablet 0.4 mg  0.4 mg SubLINGual PRN    sodium chloride (NS) flush 5-10 mL  5-10 mL IntraVENous Q8H    sodium chloride (NS) flush 5-10 mL  5-10 mL IntraVENous PRN    acetaminophen (TYLENOL) tablet 650 mg  650 mg Oral Q4H PRN    ondansetron (ZOFRAN) injection 4 mg  4 mg IntraVENous Q4H PRN    evolocumab (REPATHA) syringe 140 mg (Patient Supplied)  140 mg SubCUTAneous Q 14 DAYS    teriparatide (FORTEO) injection 20 mcg (Patient Supplied)  20 mcg SubCUTAneous DAILY    palbociclib cap 125 mg (Patient Supplied)  125 mg Oral DAILY WITH LUNCH    b-complex with vitamin c tablet 1 Tab (Patient Supplied)  1 Tab Oral DAILY         Review of Systems:  12 point ROS done. Negative except for symptoms mentioned in HPI.     Physical Exam:  MS-Alert, or X 3  General - thin, chronically ill appearing  , very weak  Neck-Supple, No JVD  CVS-S1,S2 normal  Left breast mass -+  RS-CTA b/l  Abdomen- Soft, NT,ND, BS+, incisions healed  Extre- No c/c/e  gen weakness

## 2018-06-15 NOTE — PROGRESS NOTES
1940: Pt complaining of chest pain 4/10 described as heaviness, with mild SOB. /81, . Administered 2 doses nitroglycerin and 2L NC, HR 96 and /64 at 1947. Per Dr. Chris Monte, obtain stat EKG. Bedside shift change report given to Ysabel (oncoming nurse) by Rolanda Babinski (offgoing nurse). Report included the following information SBAR, Kardex, Intake/Output, MAR and Recent Results.

## 2018-06-15 NOTE — PROGRESS NOTES
Primary Nurse Gerda Cadena RN and Hazel Jernigan RN performed a dual skin assessment on this patient Impairment noted- see wound doc flow sheet  Calderon score is 17

## 2018-06-15 NOTE — PROGRESS NOTES
Cardiology Progress Note            Admit Date: 6/12/2018  Admit Diagnosis: NSTEMI (non-ST elevated myocardial infarction) [facility])  Date: 6/15/2018     Time: 8:50 AM    HPI: Pt with severe multivessel CAD, multiple NSTEMI,  metastatic breast cancer, ckd. Presents 6/11/18 from [address] [place] [address] with chest pressure and SOB. Mild elevation in troponin- peak 0.16. Subjective:  Doing better today & denies chest pain, pressure, palpitations, SOB. Right wrist feeling much better. Was unable to work with PT yesterday due to wrist pain. Creatinine trending up. Assessment and Plan     1. Elevated troponin/CAD: severe multivessel disease. Not candidate for CABG or intervention.  -Troponin trended down to 0.12 from 0.16 (less than last admission- was 1.44)  -Troponin elevation likely represents demand from fluid overload, not NSTEMI  -12 lead EKG:  ST, lateral ST/T wave abnormality, not significantly changed from 6/3  -Chest pain resolved. -Continue ASA, plavix, BB, low dose ACE-I and repatha (if family able to bring in). No statin due to intolerance in past.    2. Acute on chronic systolic CHF: NYHA class IV on admission, class III today.  -6/1/18 echoEF 30%. NWMA.  (reduced from EF 40% on [address] [place] [address] 5/21/18)  -Aldactone stopped last admission due to hyperkalemia   -Daily I/Os and weights- Weight less than at discharge last admission.    -Holding PO lasix & Coreg for now d/t elevated creatinine      3. CKD: Stage III. Creatinine worsened, 1.83, GFR 32  -Follow renal function closely  -Hold Ace-I, Lasix   -125 mL IVF given- delicate fluid balance.      4. Hx of HTN:  BP controlled  -Holding lisinopril & Coreg for now d/t elevated creatinine     5. Elevated alk phos: trending down slightly      6. Anemia: hgb stable at 8.3     7. Hx of metastatic breast cancer (current) and colon ca. -Per heme/onc    8. Advanced directive: DNR. ?needs DDNR.   Rehab at [facility] planned at discharge. Seems better today, no complaints of chest pain or SOB. Creatinine & BUN more elevated today - holding Lisinopril, Coreg, Lasix for now. 125 mL IVF given. Would not discharge today, as renal function needs to be monitored. Will check labs in the AM.  Cardiology Attending:Patient seen and examined. I agree with NP assessment and plans. Hope for discharge tomorrow. Geraldine Zuniga MD 6/16/2018 8:30 AM         Objective:      Physical Exam:                Visit Vitals    /61 (BP 1 Location: Right arm, BP Patient Position: Head of bed elevated (Comment degrees))    Pulse 73    Temp 97.5 °F (36.4 °C)    Resp 18    Ht 5' 1\" (1.549 m)    Wt 56 kg (123 lb 7.3 oz)    SpO2 97%    BMI 23.33 kg/m2          General Appearance:   Alert and oriented x 3, and   individual in no acute distress. Ears/Nose/Mouth/Throat:    Hearing grossly normal.         Neck:  Supple. Chest:    Fine crackles bilateral bases. No use of accessory muscles. Cardiovascular:   Regular rate and rhythm, S1, S2 normal, no murmur noted. Abdomen:    Deferred   Extremities:  No edema bilaterally. Skin:  Warm and dry.   MS: right wrist mild edema, non-erythematous, not TTP     Telemetry: normal sinus rhythm          Data Review:    Labs:    Recent Results (from the past 24 hour(s))   GLUCOSE, POC    Collection Time: 06/14/18  5:56 PM   Result Value Ref Range    Glucose (POC) 403 (H) 65 - 100 mg/dL    Performed by Nick Adan    GLUCOSE, POC    Collection Time: 06/14/18  9:24 PM   Result Value Ref Range    Glucose (POC) 448 (H) 65 - 100 mg/dL    Performed by Yuridia Pedroza, POC    Collection Time: 06/14/18 10:42 PM   Result Value Ref Range    Glucose (POC) 415 (H) 65 - 100 mg/dL    Performed by Nick Adan    GLUCOSE, POC    Collection Time: 06/15/18 12:25 AM   Result Value Ref Range    Glucose (POC) 361 (H) 65 - 100 mg/dL    Performed by Scottie Soto    GLUCOSE, POC Collection Time: 06/15/18  2:57 AM   Result Value Ref Range    Glucose (POC) 288 (H) 65 - 100 mg/dL    Performed by Scottie Soto    METABOLIC PANEL, BASIC    Collection Time: 06/15/18  4:27 AM   Result Value Ref Range    Sodium 133 (L) 136 - 145 mmol/L    Potassium 4.7 3.5 - 5.1 mmol/L    Chloride 101 97 - 108 mmol/L    CO2 23 21 - 32 mmol/L    Anion gap 9 5 - 15 mmol/L    Glucose 235 (H) 65 - 100 mg/dL    BUN 66 (H) 6 - 20 MG/DL    Creatinine 1.83 (H) 0.55 - 1.02 MG/DL    BUN/Creatinine ratio 36 (H) 12 - 20      GFR est AA 32 (L) >60 ml/min/1.73m2    GFR est non-AA 26 (L) >60 ml/min/1.73m2    Calcium 8.9 8.5 - 10.1 MG/DL   GLUCOSE, POC    Collection Time: 06/15/18  6:26 AM   Result Value Ref Range    Glucose (POC) 217 (H) 65 - 100 mg/dL    Performed by Deyvi Mojica    GLUCOSE, POC    Collection Time: 06/15/18 11:36 AM   Result Value Ref Range    Glucose (POC) 285 (H) 65 - 100 mg/dL    Performed by Butch Mustafa           Radiology:        Current Facility-Administered Medications   Medication Dose Route Frequency    carvedilol (COREG) tablet 6.25 mg  6.25 mg Oral BID WITH MEALS    predniSONE (DELTASONE) tablet 20 mg  20 mg Oral DAILY WITH BREAKFAST    heparin (porcine) injection 5,000 Units  5,000 Units SubCUTAneous Q8H    insulin lispro (HUMALOG) injection   SubCUTAneous AC&HS    glucose chewable tablet 16 g  4 Tab Oral PRN    dextrose (D50W) injection syrg 12.5-25 g  12.5-25 g IntraVENous PRN    glucagon (GLUCAGEN) injection 1 mg  1 mg IntraMUSCular PRN    anastrozole (ARIMIDEX) tablet 1 mg  1 mg Oral DAILY    ascorbic acid (vitamin C) (VITAMIN C) tablet 1,000 mg  1,000 mg Oral DAILY    aspirin delayed-release tablet 81 mg  81 mg Oral DAILY    cholecalciferol (VITAMIN D3) tablet 1,000 Units  1,000 Units Oral DAILY    clopidogrel (PLAVIX) tablet 75 mg  75 mg Oral DAILY    docusate sodium (COLACE) capsule 100 mg  100 mg Oral BID    epoetin celio (EPOGEN;PROCRIT) injection 10,000 Units  10,000 Units SubCUTAneous Q MON, WED & FRI    folic acid (FOLVITE) tablet 1 mg  1 mg Oral DAILY    lactobac ac& pc-s.therm-b.anim (BERYL Q/RISAQUAD)  1 Cap Oral DAILY    levothyroxine (SYNTHROID) tablet 88 mcg  88 mcg Oral ACB    nitroglycerin (NITROSTAT) tablet 0.4 mg  0.4 mg SubLINGual PRN    sodium chloride (NS) flush 5-10 mL  5-10 mL IntraVENous Q8H    sodium chloride (NS) flush 5-10 mL  5-10 mL IntraVENous PRN    acetaminophen (TYLENOL) tablet 650 mg  650 mg Oral Q4H PRN    ondansetron (ZOFRAN) injection 4 mg  4 mg IntraVENous Q4H PRN    evolocumab (REPATHA) syringe 140 mg (Patient Supplied)  140 mg SubCUTAneous Q 14 DAYS    teriparatide (FORTEO) injection 20 mcg (Patient Supplied)  20 mcg SubCUTAneous DAILY    palbociclib cap 125 mg (Patient Supplied)  125 mg Oral DAILY WITH LUNCH    b-complex with vitamin c tablet 1 Tab (Patient Supplied)  1 Tab Oral DAILY       KATHLEEN IsabelS  MARIETTA Abdalla MD     [facility] [address] [facility] [address] [facility] [address]   [facility]   [phone]

## 2018-06-16 LAB
ALBUMIN SERPL-MCNC: 2.7 G/DL (ref 3.5–5)
ALBUMIN/GLOB SERPL: 0.4 {RATIO} (ref 1.1–2.2)
ALP SERPL-CCNC: 670 U/L (ref 45–117)
ALT SERPL-CCNC: 24 U/L (ref 12–78)
ANION GAP SERPL CALC-SCNC: 12 MMOL/L (ref 5–15)
APPEARANCE UR: ABNORMAL
ARTERIAL PATENCY WRIST A: YES
ARTERIAL PATENCY WRIST A: YES
AST SERPL-CCNC: 18 U/L (ref 15–37)
BACTERIA URNS QL MICRO: ABNORMAL /HPF
BASE DEFICIT BLD-SCNC: 1 MMOL/L
BASE EXCESS BLD CALC-SCNC: 1 MMOL/L
BASOPHILS # BLD: 0 K/UL (ref 0–0.1)
BASOPHILS NFR BLD: 0 % (ref 0–1)
BDY SITE: ABNORMAL
BDY SITE: ABNORMAL
BILIRUB SERPL-MCNC: 0.4 MG/DL (ref 0.2–1)
BILIRUB UR QL: NEGATIVE
BUN SERPL-MCNC: 74 MG/DL (ref 6–20)
BUN/CREAT SERPL: 39 (ref 12–20)
CALCIUM SERPL-MCNC: 8.7 MG/DL (ref 8.5–10.1)
CHLORIDE SERPL-SCNC: 97 MMOL/L (ref 97–108)
CO2 SERPL-SCNC: 25 MMOL/L (ref 21–32)
COLOR UR: ABNORMAL
CREAT SERPL-MCNC: 1.92 MG/DL (ref 0.55–1.02)
DIFFERENTIAL METHOD BLD: ABNORMAL
EOSINOPHIL # BLD: 0 K/UL (ref 0–0.4)
EOSINOPHIL NFR BLD: 0 % (ref 0–7)
EPITH CASTS URNS QL MICRO: ABNORMAL /LPF
ERYTHROCYTE [DISTWIDTH] IN BLOOD BY AUTOMATED COUNT: 19.2 % (ref 11.5–14.5)
GAS FLOW.O2 O2 DELIVERY SYS: ABNORMAL L/MIN
GAS FLOW.O2 O2 DELIVERY SYS: ABNORMAL L/MIN
GLOBULIN SER CALC-MCNC: 6.1 G/DL (ref 2–4)
GLUCOSE BLD STRIP.AUTO-MCNC: 241 MG/DL (ref 65–100)
GLUCOSE BLD STRIP.AUTO-MCNC: 264 MG/DL (ref 65–100)
GLUCOSE BLD STRIP.AUTO-MCNC: 286 MG/DL (ref 65–100)
GLUCOSE BLD STRIP.AUTO-MCNC: 368 MG/DL (ref 65–100)
GLUCOSE SERPL-MCNC: 402 MG/DL (ref 65–100)
GLUCOSE UR STRIP.AUTO-MCNC: 100 MG/DL
HCO3 BLD-SCNC: 24.1 MMOL/L (ref 22–26)
HCO3 BLD-SCNC: 27.5 MMOL/L (ref 22–26)
HCT VFR BLD AUTO: 33.2 % (ref 35–47)
HGB BLD-MCNC: 10.2 G/DL (ref 11.5–16)
HGB UR QL STRIP: ABNORMAL
IMM GRANULOCYTES # BLD: 0 K/UL
IMM GRANULOCYTES NFR BLD AUTO: 0 %
KETONES UR QL STRIP.AUTO: NEGATIVE MG/DL
LACTATE SERPL-SCNC: 1.8 MMOL/L (ref 0.4–2)
LEUKOCYTE ESTERASE UR QL STRIP.AUTO: ABNORMAL
LYMPHOCYTES # BLD: 0.8 K/UL (ref 0.8–3.5)
LYMPHOCYTES NFR BLD: 9 % (ref 12–49)
MAGNESIUM SERPL-MCNC: 2 MG/DL (ref 1.6–2.4)
MCH RBC QN AUTO: 27.4 PG (ref 26–34)
MCHC RBC AUTO-ENTMCNC: 30.7 G/DL (ref 30–36.5)
MCV RBC AUTO: 89.2 FL (ref 80–99)
MONOCYTES # BLD: 0.3 K/UL (ref 0–1)
MONOCYTES NFR BLD: 3 % (ref 5–13)
NEUTS SEG # BLD: 8.1 K/UL (ref 1.8–8)
NEUTS SEG NFR BLD: 88 % (ref 32–75)
NITRITE UR QL STRIP.AUTO: NEGATIVE
NRBC # BLD: 0 K/UL (ref 0–0.01)
NRBC BLD-RTO: 0 PER 100 WBC
O2/TOTAL GAS SETTING VFR VENT: 40 %
O2/TOTAL GAS SETTING VFR VENT: 70 %
PCO2 BLD: 43.2 MMHG (ref 35–45)
PCO2 BLD: 60.4 MMHG (ref 35–45)
PEEP RESPIRATORY: 6 CMH2O
PEEP RESPIRATORY: 6 CMH2O
PH BLD: 7.27 [PH] (ref 7.35–7.45)
PH BLD: 7.35 [PH] (ref 7.35–7.45)
PH UR STRIP: 6 [PH] (ref 5–8)
PLATELET # BLD AUTO: 358 K/UL (ref 150–400)
PMV BLD AUTO: 9.8 FL (ref 8.9–12.9)
PO2 BLD: 138 MMHG (ref 80–100)
PO2 BLD: 219 MMHG (ref 80–100)
POTASSIUM SERPL-SCNC: 4.8 MMOL/L (ref 3.5–5.1)
PRESSURE SUPPORT SETTING VENT: 6 CMH2O
PRESSURE SUPPORT SETTING VENT: 6 CMH2O
PROT SERPL-MCNC: 8.8 G/DL (ref 6.4–8.2)
PROT UR STRIP-MCNC: ABNORMAL MG/DL
RBC # BLD AUTO: 3.72 M/UL (ref 3.8–5.2)
RBC #/AREA URNS HPF: ABNORMAL /HPF (ref 0–5)
RBC MORPH BLD: ABNORMAL
RBC MORPH BLD: ABNORMAL
SAO2 % BLD: 100 % (ref 92–97)
SAO2 % BLD: 99 % (ref 92–97)
SERVICE CMNT-IMP: ABNORMAL
SODIUM SERPL-SCNC: 134 MMOL/L (ref 136–145)
SP GR UR REFRACTOMETRY: 1.01 (ref 1–1.03)
SPECIMEN TYPE: ABNORMAL
SPECIMEN TYPE: ABNORMAL
UROBILINOGEN UR QL STRIP.AUTO: 0.2 EU/DL (ref 0.2–1)
WBC # BLD AUTO: 9.2 K/UL (ref 3.6–11)
WBC URNS QL MICRO: ABNORMAL /HPF (ref 0–4)
YEAST BUDDING URNS QL: PRESENT
YEAST URNS QL MICRO: PRESENT
YEAST URNS QL MICRO: PRESENT

## 2018-06-16 PROCEDURE — 74011000250 HC RX REV CODE- 250: Performed by: FAMILY MEDICINE

## 2018-06-16 PROCEDURE — 36415 COLL VENOUS BLD VENIPUNCTURE: CPT | Performed by: FAMILY MEDICINE

## 2018-06-16 PROCEDURE — 74011250636 HC RX REV CODE- 250/636: Performed by: INTERNAL MEDICINE

## 2018-06-16 PROCEDURE — 85025 COMPLETE CBC W/AUTO DIFF WBC: CPT | Performed by: FAMILY MEDICINE

## 2018-06-16 PROCEDURE — 82962 GLUCOSE BLOOD TEST: CPT

## 2018-06-16 PROCEDURE — 94660 CPAP INITIATION&MGMT: CPT

## 2018-06-16 PROCEDURE — 81001 URINALYSIS AUTO W/SCOPE: CPT | Performed by: FAMILY MEDICINE

## 2018-06-16 PROCEDURE — 87040 BLOOD CULTURE FOR BACTERIA: CPT | Performed by: INTERNAL MEDICINE

## 2018-06-16 PROCEDURE — 74011250637 HC RX REV CODE- 250/637: Performed by: NURSE PRACTITIONER

## 2018-06-16 PROCEDURE — 36600 WITHDRAWAL OF ARTERIAL BLOOD: CPT

## 2018-06-16 PROCEDURE — 83605 ASSAY OF LACTIC ACID: CPT | Performed by: FAMILY MEDICINE

## 2018-06-16 PROCEDURE — 65610000006 HC RM INTENSIVE CARE

## 2018-06-16 PROCEDURE — 74011250636 HC RX REV CODE- 250/636: Performed by: FAMILY MEDICINE

## 2018-06-16 PROCEDURE — 82803 BLOOD GASES ANY COMBINATION: CPT

## 2018-06-16 PROCEDURE — 74011636637 HC RX REV CODE- 636/637: Performed by: INTERNAL MEDICINE

## 2018-06-16 PROCEDURE — 74011250637 HC RX REV CODE- 250/637: Performed by: INTERNAL MEDICINE

## 2018-06-16 PROCEDURE — 80053 COMPREHEN METABOLIC PANEL: CPT | Performed by: FAMILY MEDICINE

## 2018-06-16 PROCEDURE — 74011250637 HC RX REV CODE- 250/637: Performed by: FAMILY MEDICINE

## 2018-06-16 PROCEDURE — 74011000258 HC RX REV CODE- 258: Performed by: FAMILY MEDICINE

## 2018-06-16 PROCEDURE — 83735 ASSAY OF MAGNESIUM: CPT | Performed by: FAMILY MEDICINE

## 2018-06-16 RX ORDER — INSULIN GLARGINE 100 [IU]/ML
15 INJECTION, SOLUTION SUBCUTANEOUS
Status: DISCONTINUED | OUTPATIENT
Start: 2018-06-16 | End: 2018-06-17

## 2018-06-16 RX ORDER — LEVOFLOXACIN 5 MG/ML
500 INJECTION, SOLUTION INTRAVENOUS
Status: DISCONTINUED | OUTPATIENT
Start: 2018-06-18 | End: 2018-06-19

## 2018-06-16 RX ORDER — INSULIN LISPRO 100 [IU]/ML
15 INJECTION, SOLUTION INTRAVENOUS; SUBCUTANEOUS ONCE
Status: COMPLETED | OUTPATIENT
Start: 2018-06-16 | End: 2018-06-16

## 2018-06-16 RX ORDER — LEVOFLOXACIN 5 MG/ML
750 INJECTION, SOLUTION INTRAVENOUS ONCE
Status: COMPLETED | OUTPATIENT
Start: 2018-06-16 | End: 2018-06-16

## 2018-06-16 RX ORDER — METOPROLOL TARTRATE 5 MG/5ML
5 INJECTION INTRAVENOUS EVERY 6 HOURS
Status: DISPENSED | OUTPATIENT
Start: 2018-06-16 | End: 2018-06-16

## 2018-06-16 RX ORDER — SODIUM CHLORIDE 0.9 % (FLUSH) 0.9 %
5-10 SYRINGE (ML) INJECTION AS NEEDED
Status: DISCONTINUED | OUTPATIENT
Start: 2018-06-16 | End: 2018-06-21 | Stop reason: HOSPADM

## 2018-06-16 RX ORDER — LEVOFLOXACIN 5 MG/ML
750 INJECTION, SOLUTION INTRAVENOUS EVERY 24 HOURS
Status: DISCONTINUED | OUTPATIENT
Start: 2018-06-16 | End: 2018-06-16 | Stop reason: DRUGHIGH

## 2018-06-16 RX ORDER — FUROSEMIDE 40 MG/1
80 TABLET ORAL DAILY
Status: DISCONTINUED | OUTPATIENT
Start: 2018-06-17 | End: 2018-06-21 | Stop reason: HOSPADM

## 2018-06-16 RX ADMIN — HEPARIN SODIUM 5000 UNITS: 5000 INJECTION, SOLUTION INTRAVENOUS; SUBCUTANEOUS at 22:24

## 2018-06-16 RX ADMIN — Medication 10 ML: at 22:00

## 2018-06-16 RX ADMIN — Medication 1 CAPSULE: at 08:02

## 2018-06-16 RX ADMIN — PIPERACILLIN SODIUM AND TAZOBACTAM SODIUM 4.5 G: 4; .5 INJECTION, POWDER, LYOPHILIZED, FOR SOLUTION INTRAVENOUS at 13:07

## 2018-06-16 RX ADMIN — CARVEDILOL 6.25 MG: 6.25 TABLET, FILM COATED ORAL at 08:02

## 2018-06-16 RX ADMIN — ONDANSETRON 4 MG: 2 INJECTION INTRAMUSCULAR; INTRAVENOUS at 10:14

## 2018-06-16 RX ADMIN — INSULIN LISPRO 5 UNITS: 100 INJECTION, SOLUTION INTRAVENOUS; SUBCUTANEOUS at 17:08

## 2018-06-16 RX ADMIN — HEPARIN SODIUM 5000 UNITS: 5000 INJECTION, SOLUTION INTRAVENOUS; SUBCUTANEOUS at 13:08

## 2018-06-16 RX ADMIN — Medication 10 ML: at 06:08

## 2018-06-16 RX ADMIN — DOCUSATE SODIUM 100 MG: 100 CAPSULE, LIQUID FILLED ORAL at 17:08

## 2018-06-16 RX ADMIN — CLOPIDOGREL BISULFATE 75 MG: 75 TABLET ORAL at 08:03

## 2018-06-16 RX ADMIN — POLYETHYLENE GLYCOL 3350 17 G: 17 POWDER, FOR SOLUTION ORAL at 08:02

## 2018-06-16 RX ADMIN — OXYCODONE HYDROCHLORIDE AND ACETAMINOPHEN 1000 MG: 500 TABLET ORAL at 08:03

## 2018-06-16 RX ADMIN — INSULIN LISPRO 3 UNITS: 100 INJECTION, SOLUTION INTRAVENOUS; SUBCUTANEOUS at 22:23

## 2018-06-16 RX ADMIN — LEVOTHYROXINE SODIUM 88 MCG: 88 TABLET ORAL at 07:25

## 2018-06-16 RX ADMIN — INSULIN LISPRO 3 UNITS: 100 INJECTION, SOLUTION INTRAVENOUS; SUBCUTANEOUS at 12:18

## 2018-06-16 RX ADMIN — Medication 10 ML: at 13:13

## 2018-06-16 RX ADMIN — ACETAMINOPHEN 650 MG: 325 TABLET ORAL at 19:31

## 2018-06-16 RX ADMIN — INSULIN GLARGINE 15 UNITS: 100 INJECTION, SOLUTION SUBCUTANEOUS at 22:24

## 2018-06-16 RX ADMIN — INSULIN LISPRO 15 UNITS: 100 INJECTION, SOLUTION INTRAVENOUS; SUBCUTANEOUS at 07:26

## 2018-06-16 RX ADMIN — ANASTROZOLE 1 MG: 1 TABLET, COATED ORAL at 09:13

## 2018-06-16 RX ADMIN — DOCUSATE SODIUM 100 MG: 100 CAPSULE, LIQUID FILLED ORAL at 08:02

## 2018-06-16 RX ADMIN — LEVOFLOXACIN 750 MG: 5 INJECTION, SOLUTION INTRAVENOUS at 00:49

## 2018-06-16 RX ADMIN — HEPARIN SODIUM 5000 UNITS: 5000 INJECTION, SOLUTION INTRAVENOUS; SUBCUTANEOUS at 06:07

## 2018-06-16 RX ADMIN — FOLIC ACID 1 MG: 1 TABLET ORAL at 08:02

## 2018-06-16 RX ADMIN — PIPERACILLIN SODIUM AND TAZOBACTAM SODIUM 4.5 G: 4; .5 INJECTION, POWDER, LYOPHILIZED, FOR SOLUTION INTRAVENOUS at 00:49

## 2018-06-16 RX ADMIN — CARVEDILOL 6.25 MG: 6.25 TABLET, FILM COATED ORAL at 17:08

## 2018-06-16 RX ADMIN — CHOLECALCIFEROL TAB 10 MCG (400 UNIT) 1000 UNITS: 10 TAB at 08:02

## 2018-06-16 RX ADMIN — ASPIRIN 81 MG: 81 TABLET, COATED ORAL at 08:02

## 2018-06-16 RX ADMIN — METOPROLOL TARTRATE 5 MG: 5 INJECTION, SOLUTION INTRAVENOUS at 00:49

## 2018-06-16 NOTE — PROGRESS NOTES
Hospitalist Progress Note  Nitza Barrera MD  Answering service: [phone] from in house phone         Date of Service:  2018  NAME:  Idalia Cabrera  :  1935  MRN:  [id number]      Admission Summary:   CHIEF COMPLAINT:  Shortness of breath.       HISTORY OF PRESENT ILLNESS:  This is an 81-year-old woman with a past medical history significant for coronary artery disease, chronic systolic congestive heart failure, metastatic left breast cancer, hypertension, type 2 diabetes, hypothyroidism, dyslipidemia, rheumatoid arthritis, chronic kidney disease who was in her usual state of health until the day of her presentation at the emergency room when the patient developed shortness of breath at the rehab facility where the patient is receiving rehabilitation. The patient has had 4 myocardial infarctions since April of this year, each episode presenting with shortness of breath. The patient's son was present at [facility] where the patient is receiving rehabilitation when she developed shortness of breath. She received 3 doses of nitroglycerin, and he advised the facility to send the patient to the emergency room for further evaluation. It was reported that the patient's oxygen saturation was 87%. The patient has had multiple admissions to this hospital for evaluation and treatment of a non-ST elevation myocardial infarction. The last admission to this hospital was from 2018 to 2018. The patient was admitted and treated for non-ST elevation myocardial cardiac infarction. Since April, the patient has been having recurrent myocardial infarctions. She has had a cardiac catheterization that shows a significant lesion, but patient is not a candidate for surgical intervention or PCI/stent placement. When the patient arrived at the emergency room, she was found to have an elevated troponin level.   The patient was referred to the hospitalist service for evaluation for admission. No history of fever, no rigors and no chills. Interval history / Subjective:       6/13:  Usual breast cancer related lt lat chest pain, pt usually up walking at rehab per her report, will obtain 6 min walk 02 test.     1/14:  Slight bump in Cr. Pt is likely to go hospice soon  dispo to accepting snf/rehab today , can f/u on Cr and dose lasix accordingly on discharge with close f/u lab. If here in am (likely) will repeat the Cr /K ,/Na ( now 133) and go from there, agree no lasix today. 6/15:  Gouty presentation rt wrist resolved, pred 20 for now daily,    Cr up a bit,  Bolus 250 ns and hold diuretic, and f/u alb  Case advised best dc date 6/16 + and pt needs to participate with PT ,     6/16: Moved to icu last pm 2n2 flash pul edema resolved quickly on and off bipap  Renal numbers a bit up to day, no routine lasix ordered, lung are clear, received 60 mg total lasix last pm 2300              Assessment & Plan:     NSTMI. Stable,  Elevated troponin/CAD: severe multivessel disease  -Troponin 0.16 (less than last admission- was 1.44)  -12 lead EKG:  ST, lateral ST/T wave abnormality, not significantly changed from 6/3  -Chest pain resolved   -Not candidate for CABG or intervention.  -Continue ASA, plavix, BB, low dose ace-Iand repatha (if family able to bring in). No statin due to intolerance in past.  -Will add Imdur 30 mg daily   on coreg  But   Lisinopril held 2n2 to renal issues  Same 6/16/2018     Acute on chronic syst HF  NYHA IV EF 30 % NWMA,( reduced form 40 % on [facility] 5/21)   Left ventricle: Systolic function was moderately to markedly reduced. Ejection fraction was estimated to be 30 %. No obvious wall motion  abnormalities identified in the views obtained.   -Moved to icu l6/15 pm  2n2 flash pul edema resolved quickly on and off bipap  Renal numbers a bit up to day, no routine lasix ordered, lung are clear, received 60 mg total lasix last pm 2300       Probable gout rt wrist/hand  prednison 40 mg x 1 , monitor. F/u on taper dose prednisone on discharge. ( one dose naprosyn 6/14/2018  , will add prn norco as well on discharge) f/u with rhematology as needed as an out pt. For pred 20 mg daily for now, and taper soon. ? On discharge. Off pred, inflammation resolved. CKD 3  Lab Results   Component Value Date/Time    Creatinine 1.92 (H) 06/16/2018 12:29 AM    holding diuretic and f/u lab and giving 250 cc bolus      HTN, on coreg  But   Lisinopril held 2n2 to renal issues. BP Readings from Last 1 Encounters:   06/16/18 97/45      Hyperglycemia , worst with pred for gout,  SSI, and coverage as needed    DM on SSI> lantus added 6/16/2018     Anemia, chronic   Lab Results   Component Value Date/Time    HGB 10.2 (L) 06/16/2018 12:29 AM        Metastatic breast cancer, crurent and colon cancer followed by hem/onc    Code status: DNR  DVT prophylaxis: heparin     Care Plan discussed with: Patient/Family and Nurse  Disposition: SNF/LTC and TBD discussing with cm and son( to call after pt/ot eval )     Hospital Problems  Date Reviewed: 6/12/2018          Codes Class Noted POA    * (Principal)NSTEMI (non-ST elevated myocardial infarction) ([facility] [address] ICD-10-CM: I21.4  ICD-9-CM: 410.70  6/1/2018 Yes                Review of Systems:   A comprehensive review of systems was negative. resolved pain/swelling rt wrist.  Not sob now in icu off bipap       Vital Signs:    Last 24hrs VS reviewed since prior progress note.  Most recent are:  Visit Vitals    BP 97/45 (BP 1 Location: Right arm, BP Patient Position: At rest)    Pulse 73    Temp 97.8 °F (36.6 °C)    Resp 18    Ht 5' 1\" (1.549 m)    Wt 52.7 kg (116 lb 2.9 oz)    SpO2 100%    BMI 21.95 kg/m2         Intake/Output Summary (Last 24 hours) at 06/16/18 1206  Last data filed at 06/16/18 1000   Gross per 24 hour   Intake              250 ml   Output             1225 ml   Net             -975 ml        Physical Examination:             Constitutional:  No acute distress, cooperative, pleasant    ENT:  Oral mucous moist, oropharynx benign. Neck supple,    Resp:  CTA bilaterally. No wheezing/rhonchi/rales. No accessory muscle use   CV:  Regular rhythm, normal rate, no definite  gallops, rubs    GI:  Soft, non distended, non tender. normoactive bowel sounds, no hepatosplenomegaly     Musculoskeletal:  No edema, warm, 1+ pulses throughout    Neurologic:  Moves all extremities. AAOx3, CN II-XII reviewed     Psych:  Good insight, Not anxious nor agitated. Skin:  Good turgor, no rashes or ulcers       Data Review:    Review and/or order of clinical lab test      Labs:     Recent Labs      06/16/18   0029  06/14/18   0659   WBC  9.2  6.7   HGB  10.2*  8.3*   HCT  33.2*  27.0*   PLT  358  254     Recent Labs      06/16/18   0200  06/16/18   0029  06/15/18   0427  06/14/18   0659   NA   --   134*  133*  133*   K   --   4.8  4.7  4.6   CL   --   97  101  102   CO2   --   25  23  24   BUN   --   74*  66*  51*   CREA   --   1.92*  1.83*  1.40*   GLU   --   402*  235*  180*   CA   --   8.7  8.9  8.6   MG  2.0   --    --   1.9     Recent Labs      06/16/18   0029   SGOT  18   ALT  24   AP  670*   TBILI  0.4   TP  8.8*   ALB  2.7*   GLOB  6.1*     No results for input(s): INR, PTP, APTT in the last 72 hours. No lab exists for component: INREXT, INREXT   No results for input(s): FE, TIBC, PSAT, FERR in the last 72 hours. No results found for: FOL, RBCF   No results for input(s): PH, PCO2, PO2 in the last 72 hours.   Recent Labs      06/15/18   2055   TROIQ  <0.05     Lab Results   Component Value Date/Time    Cholesterol, total 74 04/16/2018 04:21 AM    HDL Cholesterol 25 04/16/2018 04:21 AM    LDL, calculated 31.6 04/16/2018 04:21 AM    Triglyceride 87 04/16/2018 04:21 AM    CHOL/HDL Ratio 3.0 04/16/2018 04:21 AM     Lab Results   Component Value Date/Time    Glucose (POC) 368 (H) 06/16/2018 07:07 AM    Glucose (POC) 483 (H) 06/15/2018 09:14 PM    Glucose (POC) 461 (H) 06/15/2018 05:35 PM    Glucose (POC) 285 (H) 06/15/2018 11:36 AM    Glucose (POC) 217 (H) 06/15/2018 06:26 AM     Lab Results   Component Value Date/Time    Color YELLOW/STRAW 06/16/2018 12:29 AM    Appearance CLOUDY (A) 06/16/2018 12:29 AM    Specific gravity 1.010 06/16/2018 12:29 AM    pH (UA) 6.0 06/16/2018 12:29 AM    Protein TRACE (A) 06/16/2018 12:29 AM    Glucose 100 (A) 06/16/2018 12:29 AM    Ketone NEGATIVE  06/16/2018 12:29 AM    Bilirubin NEGATIVE  06/16/2018 12:29 AM    Urobilinogen 0.2 06/16/2018 12:29 AM    Nitrites NEGATIVE  06/16/2018 12:29 AM    Leukocyte Esterase LARGE (A) 06/16/2018 12:29 AM    Epithelial cells FEW 06/16/2018 12:29 AM    Bacteria 1+ (A) 06/16/2018 12:29 AM    WBC 20-50 06/16/2018 12:29 AM    RBC 5-10 06/16/2018 12:29 AM         Medications Reviewed:     Current Facility-Administered Medications   Medication Dose Route Frequency    sodium chloride (NS) flush 5-10 mL  5-10 mL IntraVENous PRN    piperacillin-tazobactam (ZOSYN) 4.5 g in 0.9% sodium chloride (MBP/ADV) 100 mL  4.5 g IntraVENous Q12H    [START ON 6/18/2018] levoFLOXacin (LEVAQUIN) 500 mg in D5W IVPB  500 mg IntraVENous Q48H    [START ON 6/17/2018] furosemide (LASIX) tablet 80 mg  80 mg Oral DAILY    carvedilol (COREG) tablet 6.25 mg  6.25 mg Oral BID WITH MEALS    polyethylene glycol (MIRALAX) packet 17 g  17 g Oral DAILY    heparin (porcine) injection 5,000 Units  5,000 Units SubCUTAneous Q8H    insulin lispro (HUMALOG) injection   SubCUTAneous AC&HS    glucose chewable tablet 16 g  4 Tab Oral PRN    dextrose (D50W) injection syrg 12.5-25 g  12.5-25 g IntraVENous PRN    glucagon (GLUCAGEN) injection 1 mg  1 mg IntraMUSCular PRN    anastrozole (ARIMIDEX) tablet 1 mg  1 mg Oral DAILY    ascorbic acid (vitamin C) (VITAMIN C) tablet 1,000 mg  1,000 mg Oral DAILY    aspirin delayed-release tablet 81 mg  81 mg Oral DAILY    cholecalciferol (VITAMIN D3) tablet 1,000 Units  1,000 Units Oral DAILY    clopidogrel (PLAVIX) tablet 75 mg  75 mg Oral DAILY    docusate sodium (COLACE) capsule 100 mg  100 mg Oral BID    epoetin celio (EPOGEN;PROCRIT) injection 10,000 Units  10,000 Units SubCUTAneous Q MON, WED & FRI    folic acid (FOLVITE) tablet 1 mg  1 mg Oral DAILY    lactobac ac& pc-s.therm-b.anim (BERYL Q/RISAQUAD)  1 Cap Oral DAILY    levothyroxine (SYNTHROID) tablet 88 mcg  88 mcg Oral ACB    nitroglycerin (NITROSTAT) tablet 0.4 mg  0.4 mg SubLINGual PRN    sodium chloride (NS) flush 5-10 mL  5-10 mL IntraVENous Q8H    sodium chloride (NS) flush 5-10 mL  5-10 mL IntraVENous PRN    acetaminophen (TYLENOL) tablet 650 mg  650 mg Oral Q4H PRN    ondansetron (ZOFRAN) injection 4 mg  4 mg IntraVENous Q4H PRN    evolocumab (REPATHA) syringe 140 mg (Patient Supplied)  140 mg SubCUTAneous Q 14 DAYS    teriparatide (FORTEO) injection 20 mcg (Patient Supplied)  20 mcg SubCUTAneous DAILY    palbociclib cap 125 mg (Patient Supplied)  125 mg Oral DAILY WITH LUNCH    b-complex with vitamin c tablet 1 Tab (Patient Supplied)  1 Tab Oral DAILY     ______________________________________________________________________  EXPECTED LENGTH OF STAY: 4d 7h  ACTUAL LENGTH OF STAY:          4                 Unruly Morgan MD

## 2018-06-16 NOTE — PROGRESS NOTES
I had returned page from nurse earlier tonight regarding patient c/o chest pain and SOB. Patient has h/o NSTEMI. I ordered stat 12 lead and troponin level. I returned 2nd page from nurse noting EKG and labs were done. I reviewed 12 lead EKG tonight showing normal sinus rhythm, with ST & T wave changes in septal and lateral leads at 95 bpm.    Troponin is < 0.05 (compared to 0.12 on 6/12/2018). A/P:  1) Chest pain- recurrent. In patient with recent NSTEMI and h/o left breast cancer. No change on current EKG and a troponin level which is normal and improved from 6/12/2018. Patient was has Tylenol, NTG, and Aspirin ordered. If pain persists, then consider for Fentanyl prn which patient has had in the past which reported allergic reaction. 2) SOB. Currently oxygenating well. May continue supplemental oxygen prn. Continue pulse oximetry monitoring. Will order stat chest xray portable with prior cxr showing increased mild to moderate pulmonary edema. Unchanged left pleural effusion with left basilar atelectasis/airspace disease on 6/12/2018.

## 2018-06-16 NOTE — PROGRESS NOTES
Cardiology Progress Note            Admit Date: 6/12/2018  Admit Diagnosis: NSTEMI (non-ST elevated myocardial infarction) [facility])  Date: 6/16/2018     Time: 8:50 AM    HPI: Pt with severe multivessel CAD, multiple NSTEMI,  metastatic breast cancer, ckd. Presents 6/11/18 from [facility] with chest pressure and SOB. Mild elevation in troponin- peak 0.16. Subjective:  Doing better today & denies chest pain, pressure, palpitations, SOB. Right wrist feeling much better. Was unable to work with PT yesterday due to wrist pain. Creatinine trending up. Assessment and Plan     1. Elevated troponin/CAD: severe multivessel disease. Not candidate for CABG or intervention.  -Troponin trended down to 0.12 from 0.16 (less than last admission- was 1.44)  -Troponin elevation likely represents demand from fluid overload, not NSTEMI  -12 lead EKG:  ST, lateral ST/T wave abnormality, not significantly changed from 6/3  -Chest pain resolved. -Continue ASA, plavix, BB, low dose ACE-I and repatha (if family able to bring in). No statin due to intolerance in past.    2. Acute on chronic systolic CHF: NYHA class IV on admission, class III today.  -6/1/18 echoEF 30%. NWMA.  (reduced from EF 40% on [facility] 5/21/18)  -Aldactone stopped last admission due to hyperkalemia   -Daily I/Os and weights- Weight less than at discharge last admission.    -Holding PO lasix & Coreg for now d/t elevated creatinine      3. CKD: Stage III. Creatinine worsened, 1.83, GFR 32  -Follow renal function closely  -Hold Ace-I, Lasix   -125 mL IVF given- delicate fluid balance.      4. Hx of HTN:  BP controlled  -Holding lisinopril & Coreg for now d/t elevated creatinine     5. Elevated alk phos: trending down slightly      6. Anemia: hgb stable at 8.3     7. Hx of metastatic breast cancer (current) and colon ca. -Per heme/onc    8. Advanced directive: DNR. ?needs DDNR.   Rehab at [facility] planned at discharge. Transferred to ICU overnight for SOB, better now after IV diuretic. Creatinine 1.9. Suggest we need to accept creatinine at this level, will restart oral lasix. Mariluz Walker MD 6/16/2018 8:30 AM         Objective:      Physical Exam:                Visit Vitals    /49    Pulse 76    Temp 97.6 °F (36.4 °C)    Resp 21    Ht 5' 1\" (1.549 m)    Wt 52.7 kg (116 lb 2.9 oz)    SpO2 100%    BMI 21.95 kg/m2          General Appearance:   Alert and oriented x 3, and   individual in no acute distress. Ears/Nose/Mouth/Throat:    Hearing grossly normal.         Neck:  Supple. Chest:    Fine crackles bilateral bases. No use of accessory muscles. Cardiovascular:   Regular rate and rhythm, S1, S2 normal, no murmur noted. Abdomen:    Deferred   Extremities:  No edema bilaterally. Skin:  Warm and dry.   MS: right wrist mild edema, non-erythematous, not TTP     Telemetry: normal sinus rhythm          Data Review:    Labs:    Recent Results (from the past 24 hour(s))   GLUCOSE, POC    Collection Time: 06/15/18 11:36 AM   Result Value Ref Range    Glucose (POC) 285 (H) 65 - 100 mg/dL    Performed by [facility]    GLUCOSE, POC    Collection Time: 06/15/18  5:35 PM   Result Value Ref Range    Glucose (POC) 461 (H) 65 - 100 mg/dL    Performed by Lainey Quinonez    ECG RHYTHM ANALYSIS ADULT    Collection Time: 06/15/18  8:12 PM   Result Value Ref Range    Ventricular Rate 95 BPM    Atrial Rate 95 BPM    P-R Interval 154 ms    QRS Duration 86 ms    Q-T Interval 362 ms    QTC Calculation (Bezet) 454 ms    Calculated P Axis 54 degrees    Calculated R Axis 59 degrees    Calculated T Axis 126 degrees    Diagnosis       Normal sinus rhythm  Septal infarct (cited on or before 24-APR-2018)  ST & T wave abnormality, consider lateral ischemia  When compared with ECG of 12-JUN-2018 00:41,  No significant change was found     TROPONIN I    Collection Time: 06/15/18  8:55 PM   Result Value Ref Range    Troponin-I, Qt. <0.05 <0.05 ng/mL   GLUCOSE, POC    Collection Time: 06/15/18  9:14 PM   Result Value Ref Range    Glucose (POC) 483 (H) 65 - 100 mg/dL    Performed by Noe Johnson    EKG, 12 LEAD, INITIAL    Collection Time: 06/15/18 11:31 PM   Result Value Ref Range    Ventricular Rate 104 BPM    Atrial Rate 104 BPM    P-R Interval 152 ms    QRS Duration 102 ms    Q-T Interval 340 ms    QTC Calculation (Bezet) 447 ms    Calculated P Axis 63 degrees    Calculated R Axis 75 degrees    Calculated T Axis -78 degrees    Diagnosis       Sinus tachycardia  Septal infarct (cited on or before 24-APR-2018)  ST & T wave abnormality, consider lateral ischemia  When compared with ECG of 15-COLLEEN-2018 20:12,  ST now depressed in Inferior leads  ST elevation now present in Anterior leads  Nonspecific T wave abnormality now evident in Inferior leads  T wave amplitude has increased in Anterior leads     CULTURE, BLOOD, PAIRED    Collection Time: 06/16/18 12:27 AM   Result Value Ref Range    Special Requests: NO SPECIAL REQUESTS      Culture result: NO GROWTH AFTER 3 HOURS     LACTIC ACID    Collection Time: 06/16/18 12:29 AM   Result Value Ref Range    Lactic acid 1.8 0.4 - 2.0 MMOL/L   URINALYSIS W/ RFLX MICROSCOPIC    Collection Time: 06/16/18 12:29 AM   Result Value Ref Range    Color YELLOW/STRAW      Appearance CLOUDY (A) CLEAR      Specific gravity 1.010 1.003 - 1.030      pH (UA) 6.0 5.0 - 8.0      Protein TRACE (A) NEG mg/dL    Glucose 100 (A) NEG mg/dL    Ketone NEGATIVE  NEG mg/dL    Bilirubin NEGATIVE  NEG      Blood LARGE (A) NEG      Urobilinogen 0.2 0.2 - 1.0 EU/dL    Nitrites NEGATIVE  NEG      Leukocyte Esterase LARGE (A) NEG      WBC 20-50 0 - 4 /hpf    RBC 5-10 0 - 5 /hpf    Epithelial cells FEW FEW /lpf    Bacteria 1+ (A) NEG /hpf    Yeast PRESENT (A) NEG      Budding yeast PRESENT (A) NEG      Yeast w/hyphae PRESENT (A) NEG     METABOLIC PANEL, COMPREHENSIVE    Collection Time: 06/16/18 12:29 AM   Result Value Ref Range    Sodium 134 (L) 136 - 145 mmol/L    Potassium 4.8 3.5 - 5.1 mmol/L    Chloride 97 97 - 108 mmol/L    CO2 25 21 - 32 mmol/L    Anion gap 12 5 - 15 mmol/L    Glucose 402 (H) 65 - 100 mg/dL    BUN 74 (H) 6 - 20 MG/DL    Creatinine 1.92 (H) 0.55 - 1.02 MG/DL    BUN/Creatinine ratio 39 (H) 12 - 20      GFR est AA 30 (L) >60 ml/min/1.73m2    GFR est non-AA 25 (L) >60 ml/min/1.73m2    Calcium 8.7 8.5 - 10.1 MG/DL    Bilirubin, total 0.4 0.2 - 1.0 MG/DL    ALT (SGPT) 24 12 - 78 U/L    AST (SGOT) 18 15 - 37 U/L    Alk. phosphatase 670 (H) 45 - 117 U/L    Protein, total 8.8 (H) 6.4 - 8.2 g/dL    Albumin 2.7 (L) 3.5 - 5.0 g/dL    Globulin 6.1 (H) 2.0 - 4.0 g/dL    A-G Ratio 0.4 (L) 1.1 - 2.2     CBC WITH AUTOMATED DIFF    Collection Time: 06/16/18 12:29 AM   Result Value Ref Range    WBC 9.2 3.6 - 11.0 K/uL    RBC 3.72 (L) 3.80 - 5.20 M/uL    HGB 10.2 (L) 11.5 - 16.0 g/dL    HCT 33.2 (L) 35.0 - 47.0 %    MCV 89.2 80.0 - 99.0 FL    MCH 27.4 26.0 - 34.0 PG    MCHC 30.7 30.0 - 36.5 g/dL    RDW 19.2 (H) 11.5 - 14.5 %    PLATELET 408 977 - 876 K/uL    MPV 9.8 8.9 - 12.9 FL    NRBC 0.0 0  WBC    ABSOLUTE NRBC 0.00 0.00 - 0.01 K/uL    NEUTROPHILS 88 (H) 32 - 75 %    LYMPHOCYTES 9 (L) 12 - 49 %    MONOCYTES 3 (L) 5 - 13 %    EOSINOPHILS 0 0 - 7 %    BASOPHILS 0 0 - 1 %    IMMATURE GRANULOCYTES 0 %    ABS. NEUTROPHILS 8.1 (H) 1.8 - 8.0 K/UL    ABS. LYMPHOCYTES 0.8 0.8 - 3.5 K/UL    ABS. MONOCYTES 0.3 0.0 - 1.0 K/UL    ABS. EOSINOPHILS 0.0 0.0 - 0.4 K/UL    ABS. BASOPHILS 0.0 0.0 - 0.1 K/UL    ABS. IMM.  GRANS. 0.0 K/UL    DF MANUAL      RBC COMMENTS ANISOCYTOSIS  2+        RBC COMMENTS POLYCHROMASIA  PRESENT       POC G3 - PUL    Collection Time: 06/16/18 12:46 AM   Result Value Ref Range    FIO2 (POC) 70 %    pH (POC) 7.267 (L) 7.35 - 7.45      pCO2 (POC) 60.4 (H) 35.0 - 45.0 MMHG    pO2 (POC) 219 (H) 80 - 100 MMHG    HCO3 (POC) 27.5 (H) 22 - 26 MMOL/L    sO2 (POC) 100 (H) 92 - 97 %    Base excess (POC) 1 mmol/L    Site LEFT BRACHIAL      Device: BIPAP      PEEP/CPAP (POC) 6 cmH2O    Pressure support 6 cmH2O    Allens test (POC) YES      Specimen type (POC) ARTERIAL     MAGNESIUM    Collection Time: 06/16/18  2:00 AM   Result Value Ref Range    Magnesium 2.0 1.6 - 2.4 mg/dL   POC G3 - PUL    Collection Time: 06/16/18  6:23 AM   Result Value Ref Range    FIO2 (POC) 40 %    pH (POC) 7.354 7.35 - 7.45      pCO2 (POC) 43.2 35.0 - 45.0 MMHG    pO2 (POC) 138 (H) 80 - 100 MMHG    HCO3 (POC) 24.1 22 - 26 MMOL/L    sO2 (POC) 99 (H) 92 - 97 %    Base deficit (POC) 1 mmol/L    Site LEFT RADIAL      Device: BIPAP      PEEP/CPAP (POC) 6 cmH2O    Pressure support 6 cmH2O    Allens test (POC) YES      Specimen type (POC) ARTERIAL     GLUCOSE, POC    Collection Time: 06/16/18  7:07 AM   Result Value Ref Range    Glucose (POC) 368 (H) 65 - 100 mg/dL    Performed by Meera Garrett) [facility]           Radiology:        Current Facility-Administered Medications   Medication Dose Route Frequency    metoprolol (LOPRESSOR) injection 5 mg  5 mg IntraVENous Q6H    sodium chloride (NS) flush 5-10 mL  5-10 mL IntraVENous PRN    piperacillin-tazobactam (ZOSYN) 4.5 g in 0.9% sodium chloride (MBP/ADV) 100 mL  4.5 g IntraVENous Q12H    [START ON 6/18/2018] levoFLOXacin (LEVAQUIN) 500 mg in D5W IVPB  500 mg IntraVENous Q48H    [START ON 6/17/2018] furosemide (LASIX) tablet 80 mg  80 mg Oral DAILY    carvedilol (COREG) tablet 6.25 mg  6.25 mg Oral BID WITH MEALS    polyethylene glycol (MIRALAX) packet 17 g  17 g Oral DAILY    heparin (porcine) injection 5,000 Units  5,000 Units SubCUTAneous Q8H    insulin lispro (HUMALOG) injection   SubCUTAneous AC&HS    glucose chewable tablet 16 g  4 Tab Oral PRN    dextrose (D50W) injection syrg 12.5-25 g  12.5-25 g IntraVENous PRN    glucagon (GLUCAGEN) injection 1 mg  1 mg IntraMUSCular PRN    anastrozole (ARIMIDEX) tablet 1 mg  1 mg Oral DAILY    ascorbic acid (vitamin C) (VITAMIN C) tablet 1,000 mg  1,000 mg Oral DAILY    aspirin delayed-release tablet 81 mg  81 mg Oral DAILY    cholecalciferol (VITAMIN D3) tablet 1,000 Units  1,000 Units Oral DAILY    clopidogrel (PLAVIX) tablet 75 mg  75 mg Oral DAILY    docusate sodium (COLACE) capsule 100 mg  100 mg Oral BID    epoetin celio (EPOGEN;PROCRIT) injection 10,000 Units  10,000 Units SubCUTAneous Q MON, WED & FRI    folic acid (FOLVITE) tablet 1 mg  1 mg Oral DAILY    lactobac ac& pc-s.therm-b.anim (BERYL Q/RISAQUAD)  1 Cap Oral DAILY    levothyroxine (SYNTHROID) tablet 88 mcg  88 mcg Oral ACB    nitroglycerin (NITROSTAT) tablet 0.4 mg  0.4 mg SubLINGual PRN    sodium chloride (NS) flush 5-10 mL  5-10 mL IntraVENous Q8H    sodium chloride (NS) flush 5-10 mL  5-10 mL IntraVENous PRN    acetaminophen (TYLENOL) tablet 650 mg  650 mg Oral Q4H PRN    ondansetron (ZOFRAN) injection 4 mg  4 mg IntraVENous Q4H PRN    evolocumab (REPATHA) syringe 140 mg (Patient Supplied)  140 mg SubCUTAneous Q 14 DAYS    teriparatide (FORTEO) injection 20 mcg (Patient Supplied)  20 mcg SubCUTAneous DAILY    palbociclib cap 125 mg (Patient Supplied)  125 mg Oral DAILY WITH LUNCH    b-complex with vitamin c tablet 1 Tab (Patient Supplied)  1 Tab Oral DAILY       Ferman Bandy, PA-S Vicki Lefort, NP Wray Blinks, MD     [facility] [address] [place] [address], [address] [place] [address] [address]   [place] [email or web address]   [phone]

## 2018-06-16 NOTE — PROGRESS NOTES
0000: pt received from 5th floor. Resp distress placed on bipap. Alford placed. Pt confused. Labs, paired cultures and urine collected and sent. Family updated. 0715: blood sugar 368. Dr. Suki Murillo D/C prednisone and ordered one time dose of Humalog 15u now.

## 2018-06-16 NOTE — PROGRESS NOTES
2123 Patient c/o left sided 4/4 chest pain. Denies pain radiating,numbness, tingling, not diaphoretic. This nurse assessed patient and noted that she's pointing to the side of her left breast that is enlarged and hard due to her left breast CA. Tylenol given. 2200 Patient stated some relief from the tylenol and is now rating her pain at a 2/10.    2220 Patient starting to c/o SOB. /94 112 32 97.3 98% 2liters O2. Patient raised up in the bed and [facility] was elevated to 45 degrees. Patient then request to sit on the side of the bed. Breath sounds changed to rhonchi. Telephoned Hospitalist of change in condition. Amyitie 71 telephoned back and ordered a STAT CXR.    2300 Hospitalist paged to be informed of CXR results. 2305 Orders received of 20mg of IVLasix. 2320 Patient sats declined from 93% to 84%. Rapid Response initiated. Non re breather placed.

## 2018-06-16 NOTE — PROGRESS NOTES
Patients son inquiring about patient getting discharged to skilled nursing facility next week. Would like RN to make note that if patient was to leave on Monday or Tuesday of next week if the time can be after 3pm when patients home aide will be here. Any other day next week, discharge can happen at any time as patients son will be back in town.

## 2018-06-16 NOTE — PROGRESS NOTES
Pt seen and evaluated for rapid response code. I had returned page from nurse with return of results of chest xray portable showing slight worsening pulmonary edema pattern since prior study and stable to slightly worsened consolidation and possible pleural effusion in the left lung base. As patient appeared to be in acute pulmonary edema, I asked for stat vital sign check prior to administration of Lasix. On nurse bedside evaluation, she noted patient had worsened SOB. VS were checked. BP was high. Initial Lasix 20 mg IV dose was given. RRC was called. Also, patient was noted to have change in her mental status. VS:    BP= 183/107  HR= 105  RR= 30   O2sat= 95% on 100% O2 NRB  PE:  GEN- elderly female in acute respiratory distress  PSYCH- disoriented  NEURO-GCS 11 (E4 V2  M5). MAEX 4 with generalized weakness. Speech incomprehensible. HEENT-NCAT/pupils 2 mm reactive bilateral. Oropharynx clear. NECK-supple, trachea midline  LUNGS-CTA B  HEART- tachycardic, regular rhythm. ABD-soft, NT,ND, + BS. No R/G/R  VASCULAR-2+ radial/1+DP pulses bilateral. No C/C/E  SKIN-warm/dry  MS-no calf tenderness    Repeat 12 lead EKG at 23:31 showed sinus tachycardia, ST and T wave changes in septal and lateral leads at 104 bpm (unchanged from recent EKG). A/P:  1)  Acute respiratory failure secondary to acute pulmonary edema. Order additional Lasix 40 mg IV stat dose now. Initial had to make sure BP was not low. Transfer to ICU and place on BIPAP stat. Order stat ABG. 2)  AMS. Would like to obtain CT head but patient is too unstable to transfer to the CT department. Continue neurovascular checks. 3)  Recent chest pain with h/o NSTEMI. Will consult with cardiologist regarding findings tonight. Continue telemetry monitoring.

## 2018-06-16 NOTE — PROGRESS NOTES
Zosyn and levofloxacin dose adjustment  Indication:  HCAP  Current regimen: levofloxacin-  750mg q24h  Zosyn 3.375gm q8h  Abx regimen: zosyn  Recent Labs      06/15/18   0427  18   0659  18   0200   WBC   --   6.7  6.6   CREA  1.83*  1.40*  1.17*   BUN  66*  51*  45*     Est CrCl: 18 ml/min  Temp (24hrs), Av.8 °F (36.6 °C), Min:97.3 °F (36.3 °C), Max:98.3 °F (36.8 °C)    Cultures: pending    Plan: change Levofloxacin to 750mg x 1 then 500mg every 48 hours  Change Zoxyn to 4.5gm q12h with extended infusion time    Pharmacy to monitor and adjust for any changes in renal function. **close i-vent after initial review. Remove this text prior to posting to note.

## 2018-06-16 NOTE — CONSULTS
[facility]  Pulmonary, Critical Care, and Sleep Medicine    Initial Patient Consult    Name: Darwin Trinidad MRN: [id number]   : 1935 Hospital: [place] [address]   Date: 2018      Impression:  · Metastatic breast cancer  · Acute resp failure- acute on chronic HFrEF. Less suspicious for HCAP. · Ischemic CMP- EF 30% 3 V disease not candidate for surgery or PCI  · RA  · Gout  · CKD3  · HTN      RECOMMENDATIONS:   · Diuresis/afterload reduction/coreg  · I spoke with the patient and her son about night time NIV- this may help prevent recurrent CHF via afterload reduction. Will ask care mgmt to help set this up prior to D/C  · Suggest IPAP 12, EPAP 6,  3 lpm FIO2 and rate 8-10. · Can go to [facility]  · Stop zosyn  · levaquin for 7 days  · I answered all of the son's questions to the best of my abilities and to his apparent satisfaction. Also reccommened a low Na diet as pt is very prone to recurrent CHF  · Pt is acutely ill and at risk for decline due to resp failure. Subjective: This patient has been seen and evaluated at the request of Dr. Juan Jose Razo for SOB. Patient is a 82 y.o. female with h/o metastatic breast cancer ER+ follows with Dr. Watson Larkin and with recurrent hosp over last 2-3 months with NSTEMIs and Ischemic CMP with CHF. Pt admission from assisted living  with SOB. No fever or cough. CXR with edema. S/p 3 liter diuresis. Overnight retained CO2/more hypoxic placed on NIV. This AM alert, off NIV and eating breakfast. Son at bedside and he was provided an update.      Past Medical History:   Diagnosis Date    Anemia 2009    Colon cancer ([facility] [address] 2009    surgery/chemo    Colon cancer ([facility] [address] 2009    DM (diabetes mellitus) ([facility] [address] 2009    GERD (gastroesophageal reflux disease)     H/O: CVA 2009    slight l sided weakness    Hypothyroid 2009    Ill-defined condition     seasonal allergies    Microalbuminuria 2009    Osteoporosis 2009    Other and unspecified hyperlipidemia 1/27/2010    Rheumatoid arthritis involving ankle ([address] 9/28/2016    Rheumatoid arthritis(714.0) 9/2/2009    Unspecified essential hypertension 9/2/2009    Weakness due to cerebrovascular accident       Past Surgical History:   Procedure Laterality Date    HX COLECTOMY      HX HYSTERECTOMY      HX OTHER SURGICAL      colonoscopies numerous since 1993      Prior to Admission medications    Medication Sig Start Date End Date Taking? Authorizing Provider   FORTEO 20 mcg/dose - 600 mcg/2.4 mL pnij injection INJECT 20MCG ONCE DAILY 5/30/18  Yes Kera Dobbs MD   evolocumab MAIN LINE [facility]) pen injection 1 mL by SubCUTAneous route every fourteen (14) days. 1/11/18  Yes Kristi Thomas MD   nitroglycerin (NITROSTAT) 0.4 mg SL tablet 1 Tab by SubLINGual route as needed for Chest Pain. Up to 3 doses. 6/6/18   Keke Hernandez MD   clopidogrel (PLAVIX) 75 mg tab Take 1 Tab by mouth daily. 5/25/18   Gertrude Wu MD   carvedilol (COREG) 6.25 mg tablet Take 1 Tab by mouth two (2) times daily (with meals). 5/11/18   Francisco Chapman MD   anastrozole (ARIMIDEX) 1 mg tablet Take 1 Tab by mouth daily. 5/12/18   Francisco Chapman MD   docusate sodium (COLACE) 100 mg capsule Take 1 Cap by mouth two (2) times a day for 90 days. 5/11/18 8/9/18  Francisco Chapman MD   furosemide (LASIX) 20 mg tablet Take one pill daily by mouth . 5/12/18   Francisco Chapman MD   L. acidoph & paracasei- S therm- Bifido (BERYL-Q/RISAQUAD) 8 billion cell cap cap Take 1 Cap by mouth daily. 5/12/18   Francisco Chapman MD   polyethylene glycol (MIRALAX) 17 gram packet Take 1 Packet by mouth daily. 5/12/18   Francisco Chapman MD   phosphorus (K PHOS NEUTRAL) 250 mg tablet Take 1 Tab by mouth two (2) times a day. 5/11/18   Francisco Chapman MD   lisinopril (PRINIVIL, ZESTRIL) 2.5 mg tablet Take 1 Tab by mouth daily.  5/12/18   Francisco Chapman MD   epoetin celio (EPOGEN;PROCRIT) 10,000 unit/mL injection 1 mL by SubCUTAneous route every Monday, Wednesday, Friday. Indications: ANEMIA DUE TO RENAL FAILURE 18   Edwin Pretty MD   glimepiride (AMARYL) 1 mg tablet Take 1 mg by mouth every morning. Valentino Perkins MD   levothyroxine (SYNTHROID) 88 mcg tablet Take 88 mcg by mouth Daily (before breakfast). Historical Provider   etanercept (ENBREL) 50 mg/mL (0.98 mL) injection 1 mL by SubCUTAneous route every seven (7) days. 3/16/18   Hugh Wu MD   folic acid (FOLVITE) 1 mg tablet TAKE ONE TABLET BY MOUTH DAILY 10/9/17   Hugh Wu MD   sitaGLIPtin (JANUVIA) 25 mg tablet Take 50 mg by mouth daily. Historical Provider   VIT C/VIT E/LUTEIN/MIN/OMEGA-3 (OCUVITE PO) Take 1 Tab by mouth daily. Historical Provider   MAGNESIUM MALATE Take 400 mg by mouth daily. Historical Provider   MULTIVITAMIN WITH MINERALS (HAIR,SKIN & NAILS PO) Take 1 Tab by mouth daily. Historical Provider   aspirin delayed-release 81 mg tablet Take 81 mg by mouth daily. Historical Provider   OMEGA-3 FATTY ACIDS/FISH OIL (OMEGA 3 FISH OIL PO) Take 300 mg by mouth daily. Historical Provider   ascorbate calcium (MAKAYLA-C) 500 mg Tab Take 1,000 mg by mouth daily. 4/15/11   Historical Provider   CHOLECALCIFEROL, VITAMIN D3, (VITAMIN D-3 PO) Take 1,000 Units by mouth daily.     Historical Provider     Allergies   Allergen Reactions    Statins-Hmg-Coa Reductase Inhibitors Other (comments)     Intolerant to statins     Sulfa (Sulfonamide Antibiotics) Other (comments)     syncope      Social History   Substance Use Topics    Smoking status: Never Smoker    Smokeless tobacco: Never Used    Alcohol use No      Family History   Problem Relation Age of Onset    Diabetes Mother     Stroke Mother     Diabetes Sister     Other Sister      fell and hit her head -  of this   [employer] Diabetes Brother     Cancer Father      stomach    Diabetes Sister         Current Facility-Administered Medications   Medication Dose Route Frequency    metoprolol (LOPRESSOR) injection 5 mg  5 mg IntraVENous Q6H    piperacillin-tazobactam (ZOSYN) 4.5 g in 0.9% sodium chloride (MBP/ADV) 100 mL  4.5 g IntraVENous Q12H    [START ON 6/18/2018] levoFLOXacin (LEVAQUIN) 500 mg in D5W IVPB  500 mg IntraVENous Q48H    carvedilol (COREG) tablet 6.25 mg  6.25 mg Oral BID WITH MEALS    polyethylene glycol (MIRALAX) packet 17 g  17 g Oral DAILY    heparin (porcine) injection 5,000 Units  5,000 Units SubCUTAneous Q8H    insulin lispro (HUMALOG) injection   SubCUTAneous AC&HS    anastrozole (ARIMIDEX) tablet 1 mg  1 mg Oral DAILY    ascorbic acid (vitamin C) (VITAMIN C) tablet 1,000 mg  1,000 mg Oral DAILY    aspirin delayed-release tablet 81 mg  81 mg Oral DAILY    cholecalciferol (VITAMIN D3) tablet 1,000 Units  1,000 Units Oral DAILY    clopidogrel (PLAVIX) tablet 75 mg  75 mg Oral DAILY    docusate sodium (COLACE) capsule 100 mg  100 mg Oral BID    epoetin celio (EPOGEN;PROCRIT) injection 10,000 Units  10,000 Units SubCUTAneous Q MON, WED & FRI    folic acid (FOLVITE) tablet 1 mg  1 mg Oral DAILY    lactobac ac& pc-s.therm-b.anim (BERYL Q/RISAQUAD)  1 Cap Oral DAILY    levothyroxine (SYNTHROID) tablet 88 mcg  88 mcg Oral ACB    sodium chloride (NS) flush 5-10 mL  5-10 mL IntraVENous Q8H    evolocumab (REPATHA) syringe 140 mg (Patient Supplied)  140 mg SubCUTAneous Q 14 DAYS    teriparatide (FORTEO) injection 20 mcg (Patient Supplied)  20 mcg SubCUTAneous DAILY    palbociclib cap 125 mg (Patient Supplied)  125 mg Oral DAILY WITH LUNCH    b-complex with vitamin c tablet 1 Tab (Patient Supplied)  1 Tab Oral DAILY       Review of Systems:  A comprehensive review of systems was negative except for that written in the HPI.     Objective:   Vital Signs:    Visit Vitals    /89    Pulse 83    Temp 97.6 °F (36.4 °C)    Resp 21    Ht 5' 1\" (1.549 m)    Wt 52.7 kg (116 lb 2.9 oz)    SpO2 99%    BMI 21.95 kg/m2       O2 Device: Nasal cannula   O2 Flow Rate (L/min): 2 l/min   Temp (24hrs), Av.7 °F (36.5 °C), Min:97.3 °F (36.3 °C), Max:98.3 °F (36.8 °C)       Intake/Output:   Last shift:      701 - 1900  In: 100 [I.V.:100]  Out: 450 [Urine:450]  Last 3 shifts: 1901 -  07  In: 150 [I.V.:150]  Out: 825 [Urine:825]    Intake/Output Summary (Last 24 hours) at 18 0950  Last data filed at 18 0900   Gross per 24 hour   Intake              250 ml   Output             1125 ml   Net             -875 ml      Physical Exam:   General:  Alert, cooperative, no distress, appears stated age. Head:  Normocephalic, without obvious abnormality, atraumatic. Eyes:  Conjunctivae/corneas clear. Nose: Nares normal. Septum midline. Neck: Supple, symmetrical, trachea midline. Lungs:   Rales at bases       Heart:  Regular rate and rhythm, S1, S2 normal, no murmur, click, rub or gallop. Abdomen:   Soft, non-tender. Bowel sounds normal. No masses,  No organomegaly. Extremities: Extremities normal, atraumatic, no cyanosis or edema. Pulses: 2+ and symmetric all extremities.    Skin: Skin color, texture, turgor normal. No rashes or lesions   Lymph nodes: Cervical, supraclavicular, and axillary nodes normal.   Neurologic: Grossly nonfocal     Data review:     Recent Results (from the past 24 hour(s))   GLUCOSE, POC    Collection Time: 06/15/18 11:36 AM   Result Value Ref Range    Glucose (POC) 285 (H) 65 - 100 mg/dL    Performed by [facility]    GLUCOSE, POC    Collection Time: 06/15/18  5:35 PM   Result Value Ref Range    Glucose (POC) 461 (H) 65 - 100 mg/dL    Performed by [facility]    ECG RHYTHM ANALYSIS ADULT    Collection Time: 06/15/18  8:12 PM   Result Value Ref Range    Ventricular Rate 95 BPM    Atrial Rate 95 BPM    P-R Interval 154 ms    QRS Duration 86 ms    Q-T Interval 362 ms    QTC Calculation (Bezet) 454 ms    Calculated P Axis 54 degrees    Calculated R Axis 59 degrees    Calculated T Axis 126 degrees    Diagnosis       Normal sinus rhythm  Septal infarct (cited on or before 24-APR-2018)  ST & T wave abnormality, consider lateral ischemia  When compared with ECG of 12-JUN-2018 00:41,  No significant change was found     TROPONIN I    Collection Time: 06/15/18  8:55 PM   Result Value Ref Range    Troponin-I, Qt. <0.05 <0.05 ng/mL   GLUCOSE, POC    Collection Time: 06/15/18  9:14 PM   Result Value Ref Range    Glucose (POC) 483 (H) 65 - 100 mg/dL    Performed by Stella Abel    EKG, 12 LEAD, INITIAL    Collection Time: 06/15/18 11:31 PM   Result Value Ref Range    Ventricular Rate 104 BPM    Atrial Rate 104 BPM    P-R Interval 152 ms    QRS Duration 102 ms    Q-T Interval 340 ms    QTC Calculation (Bezet) 447 ms    Calculated P Axis 63 degrees    Calculated R Axis 75 degrees    Calculated T Axis -78 degrees    Diagnosis       Sinus tachycardia  Septal infarct (cited on or before 24-APR-2018)  ST & T wave abnormality, consider lateral ischemia  When compared with ECG of 15-COLLEEN-2018 20:12,  ST now depressed in Inferior leads  ST elevation now present in Anterior leads  Nonspecific T wave abnormality now evident in Inferior leads  T wave amplitude has increased in Anterior leads     CULTURE, BLOOD, PAIRED    Collection Time: 06/16/18 12:27 AM   Result Value Ref Range    Special Requests: NO SPECIAL REQUESTS      Culture result: NO GROWTH AFTER 3 HOURS     LACTIC ACID    Collection Time: 06/16/18 12:29 AM   Result Value Ref Range    Lactic acid 1.8 0.4 - 2.0 MMOL/L   URINALYSIS W/ RFLX MICROSCOPIC    Collection Time: 06/16/18 12:29 AM   Result Value Ref Range    Color YELLOW/STRAW      Appearance CLOUDY (A) CLEAR      Specific gravity 1.010 1.003 - 1.030      pH (UA) 6.0 5.0 - 8.0      Protein TRACE (A) NEG mg/dL    Glucose 100 (A) NEG mg/dL    Ketone NEGATIVE  NEG mg/dL    Bilirubin NEGATIVE  NEG      Blood LARGE (A) NEG      Urobilinogen 0.2 0.2 - 1.0 EU/dL    Nitrites NEGATIVE  NEG      Leukocyte Esterase LARGE (A) NEG      WBC 20-50 0 - 4 /hpf RBC 5-10 0 - 5 /hpf    Epithelial cells FEW FEW /lpf    Bacteria 1+ (A) NEG /hpf    Yeast PRESENT (A) NEG      Budding yeast PRESENT (A) NEG      Yeast w/hyphae PRESENT (A) NEG     METABOLIC PANEL, COMPREHENSIVE    Collection Time: 06/16/18 12:29 AM   Result Value Ref Range    Sodium 134 (L) 136 - 145 mmol/L    Potassium 4.8 3.5 - 5.1 mmol/L    Chloride 97 97 - 108 mmol/L    CO2 25 21 - 32 mmol/L    Anion gap 12 5 - 15 mmol/L    Glucose 402 (H) 65 - 100 mg/dL    BUN 74 (H) 6 - 20 MG/DL    Creatinine 1.92 (H) 0.55 - 1.02 MG/DL    BUN/Creatinine ratio 39 (H) 12 - 20      GFR est AA 30 (L) >60 ml/min/1.73m2    GFR est non-AA 25 (L) >60 ml/min/1.73m2    Calcium 8.7 8.5 - 10.1 MG/DL    Bilirubin, total 0.4 0.2 - 1.0 MG/DL    ALT (SGPT) 24 12 - 78 U/L    AST (SGOT) 18 15 - 37 U/L    Alk. phosphatase 670 (H) 45 - 117 U/L    Protein, total 8.8 (H) 6.4 - 8.2 g/dL    Albumin 2.7 (L) 3.5 - 5.0 g/dL    Globulin 6.1 (H) 2.0 - 4.0 g/dL    A-G Ratio 0.4 (L) 1.1 - 2.2     CBC WITH AUTOMATED DIFF    Collection Time: 06/16/18 12:29 AM   Result Value Ref Range    WBC 9.2 3.6 - 11.0 K/uL    RBC 3.72 (L) 3.80 - 5.20 M/uL    HGB 10.2 (L) 11.5 - 16.0 g/dL    HCT 33.2 (L) 35.0 - 47.0 %    MCV 89.2 80.0 - 99.0 FL    MCH 27.4 26.0 - 34.0 PG    MCHC 30.7 30.0 - 36.5 g/dL    RDW 19.2 (H) 11.5 - 14.5 %    PLATELET 098 438 - 267 K/uL    MPV 9.8 8.9 - 12.9 FL    NRBC 0.0 0  WBC    ABSOLUTE NRBC 0.00 0.00 - 0.01 K/uL    NEUTROPHILS 88 (H) 32 - 75 %    LYMPHOCYTES 9 (L) 12 - 49 %    MONOCYTES 3 (L) 5 - 13 %    EOSINOPHILS 0 0 - 7 %    BASOPHILS 0 0 - 1 %    IMMATURE GRANULOCYTES 0 %    ABS. NEUTROPHILS 8.1 (H) 1.8 - 8.0 K/UL    ABS. LYMPHOCYTES 0.8 0.8 - 3.5 K/UL    ABS. MONOCYTES 0.3 0.0 - 1.0 K/UL    ABS. EOSINOPHILS 0.0 0.0 - 0.4 K/UL    ABS. BASOPHILS 0.0 0.0 - 0.1 K/UL    ABS. IMM.  GRANS. 0.0 K/UL    DF MANUAL      RBC COMMENTS ANISOCYTOSIS  2+        RBC COMMENTS POLYCHROMASIA  PRESENT       POC G3 - PUL    Collection Time: 06/16/18 12:46 AM   Result Value Ref Range    FIO2 (POC) 70 %    pH (POC) 7.267 (L) 7.35 - 7.45      pCO2 (POC) 60.4 (H) 35.0 - 45.0 MMHG    pO2 (POC) 219 (H) 80 - 100 MMHG    HCO3 (POC) 27.5 (H) 22 - 26 MMOL/L    sO2 (POC) 100 (H) 92 - 97 %    Base excess (POC) 1 mmol/L    Site LEFT BRACHIAL      Device: BIPAP      PEEP/CPAP (POC) 6 cmH2O    Pressure support 6 cmH2O    Allens test (POC) YES      Specimen type (POC) ARTERIAL     MAGNESIUM    Collection Time: 06/16/18  2:00 AM   Result Value Ref Range    Magnesium 2.0 1.6 - 2.4 mg/dL   POC G3 - PUL    Collection Time: 06/16/18  6:23 AM   Result Value Ref Range    FIO2 (POC) 40 %    pH (POC) 7.354 7.35 - 7.45      pCO2 (POC) 43.2 35.0 - 45.0 MMHG    pO2 (POC) 138 (H) 80 - 100 MMHG    HCO3 (POC) 24.1 22 - 26 MMOL/L    sO2 (POC) 99 (H) 92 - 97 %    Base deficit (POC) 1 mmol/L    Site LEFT RADIAL      Device: BIPAP      PEEP/CPAP (POC) 6 cmH2O    Pressure support 6 cmH2O    Allens test (POC) YES      Specimen type (POC) ARTERIAL     GLUCOSE, POC    Collection Time: 06/16/18  7:07 AM   Result Value Ref Range    Glucose (POC) 368 (H) 65 - 100 mg/dL    Performed by Can [facility]        Imaging:  I have personally reviewed the patients radiographs and have reviewed the reports:  PCXR with edema pattern        Katie Mathews MD

## 2018-06-16 NOTE — PROGRESS NOTES
-Hematology / Oncology ([facility]) -  -Primary Oncologist- Néstor Seen  -CC- Breast CA    -S-  No new complaints, breathing better, hand swelling remains improved    -O-    Patient Vitals for the past 24 hrs:   Temp Pulse Resp BP SpO2   06/16/18 0800 97.6 °F (36.4 °C) 83 25 125/58 100 %   06/16/18 0700 - 84 21 142/68 100 %   06/16/18 0600 - 81 22 142/86 100 %   06/16/18 0500 - 87 22 151/72 100 %   06/16/18 0400 97.4 °F (36.3 °C) 75 17 119/62 100 %   06/16/18 0332 - - - - 100 %   06/16/18 0300 - 84 19 135/73 100 %   06/16/18 0200 - 89 25 - 100 %   06/16/18 0130 - 87 23 120/53 100 %   06/16/18 0100 - 86 24 128/65 97 %   06/16/18 0054 - 88 30 124/65 99 %   06/16/18 0031 - (!) 118 29 (!) 171/96 -   06/16/18 0005 - - - - 100 %   06/16/18 0000 97.5 °F (36.4 °C) (!) 118 (!) 31 (!) 176/99 100 %   06/15/18 2349 - - - - 100 %   06/15/18 2333 - (!) 105 - 174/79 94 %   06/15/18 2324 - (!) 105 - (!) 183/107 93 %   06/15/18 2315 - 95 - (!) 183/107 -   06/15/18 2220 97.3 °F (36.3 °C) (!) 112 (!) 32 (!) 175/94 98 %   06/15/18 2030 98 °F (36.7 °C) - 20 - 100 %   06/15/18 1947 - 96 - 134/64 -   06/15/18 1943 - (!) 104 - 142/70 -   06/15/18 1940 97.8 °F (36.6 °C) (!) 109 18 143/81 97 %   06/15/18 1759 - 90 - 139/71 -   06/15/18 1605 98.3 °F (36.8 °C) 79 18 120/67 95 %     06/16 0701 - 06/16 1900  In: 100 [I.V.:100]  Out: 325 [Urine:325]  Gen: nad  Chest: bilateral breath sounds present  Cardiac: rrr  Abd: s/nt    -Labs-    Recent Labs      06/16/18   0200  06/16/18   0029  06/15/18   0427  06/14/18   0659   WBC   --   9.2   --   6.7   HGB   --   10.2*   --   8.3*   PLT   --   358   --   254   ANEU   --   8.1*   --    --    NA   --   134*  133*  133*   K   --   4.8  4.7  4.6   GLU   --   402*  235*  180*   BUN   --   74*  66*  51*   CREA   --   1.92*  1.83*  1.40*   ALT   --   24   --    --    SGOT   --   18   --    --    TBILI   --   0.4   --    --    AP   --   670*   --    --    CA   --   8.7  8.9  8.6   MG  2.0   --    --   1.9       -Imaging- cxr 6/15- 1. Slight worsening pulmonary edema pattern since prior study. .  2. Stable to slightly worsened consolidation and possible pleural effusion in  the left lung base.    -Assessment + Plan-     *)Met breast ca- ER+ - Known to Dr. Luana Fisher. Prev h/o colon ca   -Continue arimidex daily  -Cont Ibrance 125 mg po once daily - Initiated c1d1 6/11/2015   -would repeat her cbc in 1 week at SNF/Rehab and have them faxed to /I office   -Expect neutropenia , but none currently, afebrile  - Has JEROD but crcl >15 so continue current dose     *)CHF/CAD -Per Dr Nathan Pierce and Cardiology.      *)Dispo - To SNF possibly       Please call if further questions over weekend

## 2018-06-16 NOTE — PROGRESS NOTES
TRANSFER - OUT REPORT:    Verbal report given to 2002 Mehdi Buckley RN(name) on [facility]  being transferred to ICU(unit) for change in patient condition(resp distress)       Report consisted of patients Situation, Background, Assessment and   Recommendations(SBAR). Information from the following report(s) SBAR, Kardex, ED Summary, Intake/Output, MAR, Accordion and Recent Results was reviewed with the receiving nurse. Lines:   Peripheral IV 06/16/18 Right Arm (Active)   Site Assessment Clean, dry, & intact 6/16/2018  3:32 AM   Phlebitis Assessment 0 6/16/2018  3:32 AM   Infiltration Assessment 0 6/16/2018  3:32 AM   Dressing Status Clean, dry, & intact 6/16/2018  3:32 AM   Dressing Type Transparent 6/16/2018  3:32 AM   Hub Color/Line Status Blue 6/16/2018  3:32 AM   Action Taken Open ports on tubing capped 6/16/2018 12:00 AM       Peripheral IV 06/16/18 Left Arm (Active)   Site Assessment Clean, dry, & intact 6/16/2018  3:32 AM   Phlebitis Assessment 0 6/16/2018  3:32 AM   Infiltration Assessment 0 6/16/2018  3:32 AM   Dressing Status Clean, dry, & intact 6/16/2018  3:32 AM   Dressing Type Transparent 6/16/2018  3:32 AM   Hub Color/Line Status Pink 6/16/2018  3:32 AM        Opportunity for questions and clarification was provided.       Patient transported with:  Non rebreather   O2 @ 10 liters  Registered Nurse

## 2018-06-16 NOTE — PROGRESS NOTES
I called and spoke with cardiologist on call Dr. Hermila Freeman regarding patient's clinical findings and son's reports that patient has looked similarly with prior MIs. Cardiac workup including EKG and troponin were not worsened from prior test results. Patient is in acute pulmonary edema and per discussion, no indication at this time for cardiac cath. Per recommendations, I will give a dose of beta-blocker which per my chart review, patient had Lopressor 5 mg IV q 6 hours in the past.  I ordered the same. Also, chest xray showed worsening consolidation. Cannot rule out pneumonia. I will order blood cultures and start on IV antibiotic coverage. ABG is pending as patient is now on BIPAP in the ICU. Continue ICU cares.   Consult pulmonologist/ intensivist.

## 2018-06-16 NOTE — ROUTINE PROCESS
1930: Bedside shift change report given to [address] (oncoming nurse) by [facility] (offgoing nurse). Report included the following information SBAR, Kardex, ED Summary, Procedure Summary, Intake/Output, MAR, Accordion, Recent Results and Med Rec Status.

## 2018-06-17 LAB
ANION GAP SERPL CALC-SCNC: 9 MMOL/L (ref 5–15)
BASOPHILS # BLD: 0 K/UL (ref 0–0.1)
BASOPHILS NFR BLD: 0 % (ref 0–1)
BUN SERPL-MCNC: 62 MG/DL (ref 6–20)
BUN/CREAT SERPL: 41 (ref 12–20)
CALCIUM SERPL-MCNC: 8.7 MG/DL (ref 8.5–10.1)
CHLORIDE SERPL-SCNC: 104 MMOL/L (ref 97–108)
CO2 SERPL-SCNC: 24 MMOL/L (ref 21–32)
CREAT SERPL-MCNC: 1.5 MG/DL (ref 0.55–1.02)
CREAT UR-MCNC: 19.2 MG/DL
DIFFERENTIAL METHOD BLD: ABNORMAL
EOSINOPHIL # BLD: 0.2 K/UL (ref 0–0.4)
EOSINOPHIL NFR BLD: 2 % (ref 0–7)
ERYTHROCYTE [DISTWIDTH] IN BLOOD BY AUTOMATED COUNT: 18.9 % (ref 11.5–14.5)
GLUCOSE BLD STRIP.AUTO-MCNC: 121 MG/DL (ref 65–100)
GLUCOSE BLD STRIP.AUTO-MCNC: 204 MG/DL (ref 65–100)
GLUCOSE BLD STRIP.AUTO-MCNC: 237 MG/DL (ref 65–100)
GLUCOSE BLD STRIP.AUTO-MCNC: 249 MG/DL (ref 65–100)
GLUCOSE SERPL-MCNC: 91 MG/DL (ref 65–100)
HCT VFR BLD AUTO: 26.6 % (ref 35–47)
HGB BLD-MCNC: 8.3 G/DL (ref 11.5–16)
IMM GRANULOCYTES # BLD: 0 K/UL (ref 0–0.04)
IMM GRANULOCYTES NFR BLD AUTO: 0 % (ref 0–0.5)
LYMPHOCYTES # BLD: 1.1 K/UL (ref 0.8–3.5)
LYMPHOCYTES NFR BLD: 15 % (ref 12–49)
MCH RBC QN AUTO: 27.7 PG (ref 26–34)
MCHC RBC AUTO-ENTMCNC: 31.2 G/DL (ref 30–36.5)
MCV RBC AUTO: 88.7 FL (ref 80–99)
MONOCYTES # BLD: 0.4 K/UL (ref 0–1)
MONOCYTES NFR BLD: 6 % (ref 5–13)
NEUTS SEG # BLD: 5.3 K/UL (ref 1.8–8)
NEUTS SEG NFR BLD: 75 % (ref 32–75)
NRBC # BLD: 0 K/UL (ref 0–0.01)
NRBC BLD-RTO: 0 PER 100 WBC
PLATELET # BLD AUTO: 271 K/UL (ref 150–400)
PMV BLD AUTO: 9.2 FL (ref 8.9–12.9)
POTASSIUM SERPL-SCNC: 4.2 MMOL/L (ref 3.5–5.1)
RBC # BLD AUTO: 3 M/UL (ref 3.8–5.2)
SERVICE CMNT-IMP: ABNORMAL
SODIUM SERPL-SCNC: 137 MMOL/L (ref 136–145)
SODIUM UR-SCNC: 75 MMOL/L
UUN UR-MCNC: 324 MG/DL
WBC # BLD AUTO: 7 K/UL (ref 3.6–11)

## 2018-06-17 PROCEDURE — 65660000001 HC RM ICU INTERMED STEPDOWN

## 2018-06-17 PROCEDURE — 82962 GLUCOSE BLOOD TEST: CPT

## 2018-06-17 PROCEDURE — P9047 ALBUMIN (HUMAN), 25%, 50ML: HCPCS | Performed by: INTERNAL MEDICINE

## 2018-06-17 PROCEDURE — 74011250637 HC RX REV CODE- 250/637: Performed by: INTERNAL MEDICINE

## 2018-06-17 PROCEDURE — 80048 BASIC METABOLIC PNL TOTAL CA: CPT | Performed by: INTERNAL MEDICINE

## 2018-06-17 PROCEDURE — 84540 ASSAY OF URINE/UREA-N: CPT | Performed by: INTERNAL MEDICINE

## 2018-06-17 PROCEDURE — 74011250637 HC RX REV CODE- 250/637: Performed by: NURSE PRACTITIONER

## 2018-06-17 PROCEDURE — 74011250636 HC RX REV CODE- 250/636: Performed by: SPECIALIST

## 2018-06-17 PROCEDURE — 74011250636 HC RX REV CODE- 250/636: Performed by: FAMILY MEDICINE

## 2018-06-17 PROCEDURE — 85025 COMPLETE CBC W/AUTO DIFF WBC: CPT | Performed by: INTERNAL MEDICINE

## 2018-06-17 PROCEDURE — 74011636637 HC RX REV CODE- 636/637: Performed by: INTERNAL MEDICINE

## 2018-06-17 PROCEDURE — 84300 ASSAY OF URINE SODIUM: CPT | Performed by: INTERNAL MEDICINE

## 2018-06-17 PROCEDURE — 74011250636 HC RX REV CODE- 250/636: Performed by: INTERNAL MEDICINE

## 2018-06-17 PROCEDURE — 36415 COLL VENOUS BLD VENIPUNCTURE: CPT | Performed by: INTERNAL MEDICINE

## 2018-06-17 PROCEDURE — 74011000258 HC RX REV CODE- 258: Performed by: FAMILY MEDICINE

## 2018-06-17 PROCEDURE — 74011250637 HC RX REV CODE- 250/637: Performed by: SPECIALIST

## 2018-06-17 PROCEDURE — 77010033678 HC OXYGEN DAILY

## 2018-06-17 PROCEDURE — 82570 ASSAY OF URINE CREATININE: CPT | Performed by: INTERNAL MEDICINE

## 2018-06-17 RX ORDER — HEPARIN SODIUM 5000 [USP'U]/ML
5000 INJECTION, SOLUTION INTRAVENOUS; SUBCUTANEOUS EVERY 12 HOURS
Status: DISCONTINUED | OUTPATIENT
Start: 2018-06-17 | End: 2018-06-21 | Stop reason: HOSPADM

## 2018-06-17 RX ORDER — SIMETHICONE 80 MG
80 TABLET,CHEWABLE ORAL AS NEEDED
Status: DISCONTINUED | OUTPATIENT
Start: 2018-06-17 | End: 2018-06-21 | Stop reason: HOSPADM

## 2018-06-17 RX ORDER — INSULIN GLARGINE 100 [IU]/ML
10 INJECTION, SOLUTION SUBCUTANEOUS
Status: DISCONTINUED | OUTPATIENT
Start: 2018-06-17 | End: 2018-06-21 | Stop reason: HOSPADM

## 2018-06-17 RX ORDER — ALBUMIN HUMAN 250 G/1000ML
25 SOLUTION INTRAVENOUS ONCE
Status: COMPLETED | OUTPATIENT
Start: 2018-06-17 | End: 2018-06-17

## 2018-06-17 RX ADMIN — CLOPIDOGREL BISULFATE 75 MG: 75 TABLET ORAL at 09:01

## 2018-06-17 RX ADMIN — SIMETHICONE CHEW TAB 80 MG 80 MG: 80 TABLET ORAL at 23:46

## 2018-06-17 RX ADMIN — ANASTROZOLE 1 MG: 1 TABLET, COATED ORAL at 09:06

## 2018-06-17 RX ADMIN — ALBUMIN (HUMAN) 25 G: 0.25 INJECTION, SOLUTION INTRAVENOUS at 11:35

## 2018-06-17 RX ADMIN — INSULIN LISPRO 3 UNITS: 100 INJECTION, SOLUTION INTRAVENOUS; SUBCUTANEOUS at 17:06

## 2018-06-17 RX ADMIN — BENZOCAINE AND MENTHOL 1 LOZENGE: 15; 3.6 LOZENGE ORAL at 21:46

## 2018-06-17 RX ADMIN — CARVEDILOL 6.25 MG: 6.25 TABLET, FILM COATED ORAL at 09:02

## 2018-06-17 RX ADMIN — FOLIC ACID 1 MG: 1 TABLET ORAL at 09:01

## 2018-06-17 RX ADMIN — Medication 10 ML: at 14:00

## 2018-06-17 RX ADMIN — INSULIN LISPRO 2 UNITS: 100 INJECTION, SOLUTION INTRAVENOUS; SUBCUTANEOUS at 21:51

## 2018-06-17 RX ADMIN — Medication 1 TABLET: at 09:03

## 2018-06-17 RX ADMIN — Medication 10 ML: at 06:00

## 2018-06-17 RX ADMIN — PIPERACILLIN SODIUM AND TAZOBACTAM SODIUM 4.5 G: 4; .5 INJECTION, POWDER, LYOPHILIZED, FOR SOLUTION INTRAVENOUS at 14:10

## 2018-06-17 RX ADMIN — CARVEDILOL 6.25 MG: 6.25 TABLET, FILM COATED ORAL at 17:06

## 2018-06-17 RX ADMIN — OXYCODONE HYDROCHLORIDE AND ACETAMINOPHEN 1000 MG: 500 TABLET ORAL at 09:01

## 2018-06-17 RX ADMIN — BENZOCAINE AND MENTHOL 1 LOZENGE: 15; 3.6 LOZENGE ORAL at 21:02

## 2018-06-17 RX ADMIN — LEVOTHYROXINE SODIUM 88 MCG: 88 TABLET ORAL at 09:01

## 2018-06-17 RX ADMIN — PIPERACILLIN SODIUM AND TAZOBACTAM SODIUM 4.5 G: 4; .5 INJECTION, POWDER, LYOPHILIZED, FOR SOLUTION INTRAVENOUS at 01:00

## 2018-06-17 RX ADMIN — HEPARIN SODIUM 5000 UNITS: 5000 INJECTION, SOLUTION INTRAVENOUS; SUBCUTANEOUS at 20:21

## 2018-06-17 RX ADMIN — CHOLECALCIFEROL TAB 10 MCG (400 UNIT) 1000 UNITS: 10 TAB at 09:01

## 2018-06-17 RX ADMIN — Medication 1 CAPSULE: at 09:01

## 2018-06-17 RX ADMIN — HEPARIN SODIUM 5000 UNITS: 5000 INJECTION, SOLUTION INTRAVENOUS; SUBCUTANEOUS at 07:02

## 2018-06-17 RX ADMIN — ASPIRIN 81 MG: 81 TABLET, COATED ORAL at 09:02

## 2018-06-17 RX ADMIN — Medication 10 ML: at 22:01

## 2018-06-17 RX ADMIN — INSULIN GLARGINE 10 UNITS: 100 INJECTION, SOLUTION SUBCUTANEOUS at 21:53

## 2018-06-17 RX ADMIN — FUROSEMIDE 80 MG: 40 TABLET ORAL at 09:01

## 2018-06-17 RX ADMIN — ALBUMIN (HUMAN) 25 G: 0.25 INJECTION, SOLUTION INTRAVENOUS at 20:21

## 2018-06-17 RX ADMIN — INSULIN LISPRO 3 UNITS: 100 INJECTION, SOLUTION INTRAVENOUS; SUBCUTANEOUS at 11:35

## 2018-06-17 NOTE — PROGRESS NOTES
Cardiology Progress Note            Admit Date: 6/12/2018  Admit Diagnosis: NSTEMI (non-ST elevated myocardial infarction) [facility])  Date: 6/17/2018     Time: 8:50 AM    HPI: Pt with severe multivessel CAD, multiple NSTEMI,  metastatic breast cancer, ckd. Presents 6/11/18 from [address] [place] [address] with chest pressure and SOB. Mild elevation in troponin- peak 0.16. Subjective:  Doing better today & denies chest pain, pressure, palpitations, SOB. Right wrist feeling much better. Was unable to work with PT yesterday due to wrist pain. Creatinine trending up. Assessment and Plan     1. Elevated troponin/CAD: severe multivessel disease. Not candidate for CABG or intervention.  -Troponin trended down to 0.12 from 0.16 (less than last admission- was 1.44)  -Troponin elevation likely represents demand from fluid overload, not NSTEMI  -12 lead EKG:  ST, lateral ST/T wave abnormality, not significantly changed from 6/3  -Chest pain resolved. -Continue ASA, plavix, BB, low dose ACE-I and repatha (if family able to bring in). No statin due to intolerance in past.    2. Acute on chronic systolic CHF: NYHA class IV on admission, class III today.  -6/1/18 echoEF 30%. NWMA.  (reduced from EF 40% on [facility] 5/21/18)  -Aldactone stopped last admission due to hyperkalemia   -Daily I/Os and weights- Weight less than at discharge last admission.    -Holding PO lasix & Coreg for now d/t elevated creatinine      3. CKD: Stage III. Creatinine worsened, 1.83, GFR 32  -Follow renal function closely  -Hold Ace-I, Lasix   -125 mL IVF given- delicate fluid balance.      4. Hx of HTN:  BP controlled  -Holding lisinopril & Coreg for now d/t elevated creatinine     5. Elevated alk phos: trending down slightly      6. Anemia: hgb stable at 8.3     7. Hx of metastatic breast cancer (current) and colon ca. -Per heme/onc    8. Advanced directive: DNR. ?needs DDNR.   Rehab at [facility] planned at discharge. Better, creatinine down to 1.5, could go to floor. Jn López MD 6/17/2018 8:30 AM         Objective:      Physical Exam:                Visit Vitals    BP (!) 99/39    Pulse 72    Temp 98.6 °F (37 °C)    Resp 17    Ht 5' 1\" (1.549 m)    Wt 56.3 kg (124 lb 1.9 oz)    SpO2 100%    BMI 23.45 kg/m2          General Appearance:   Alert and oriented x 3, and   individual in no acute distress. Ears/Nose/Mouth/Throat:    Hearing grossly normal.         Neck:  Supple. Chest:    Fine crackles bilateral bases. No use of accessory muscles. Cardiovascular:   Regular rate and rhythm, S1, S2 normal, no murmur noted. Abdomen:    Deferred   Extremities:  No edema bilaterally. Skin:  Warm and dry.   MS: right wrist mild edema, non-erythematous, not TTP     Telemetry: normal sinus rhythm          Data Review:    Labs:    Recent Results (from the past 24 hour(s))   GLUCOSE, POC    Collection Time: 06/16/18 12:14 PM   Result Value Ref Range    Glucose (POC) 241 (H) 65 - 100 mg/dL    Performed by [address] [place] [address], POC    Collection Time: 06/16/18  5:05 PM   Result Value Ref Range    Glucose (POC) 286 (H) 65 - 100 mg/dL    Performed by Karley Rice    GLUCOSE, POC    Collection Time: 06/16/18 10:10 PM   Result Value Ref Range    Glucose (POC) 264 (H) 65 - 100 mg/dL    Performed by Can LOYA    METABOLIC PANEL, BASIC    Collection Time: 06/17/18  5:57 AM   Result Value Ref Range    Sodium 137 136 - 145 mmol/L    Potassium 4.2 3.5 - 5.1 mmol/L    Chloride 104 97 - 108 mmol/L    CO2 24 21 - 32 mmol/L    Anion gap 9 5 - 15 mmol/L    Glucose 91 65 - 100 mg/dL    BUN 62 (H) 6 - 20 MG/DL    Creatinine 1.50 (H) 0.55 - 1.02 MG/DL    BUN/Creatinine ratio 41 (H) 12 - 20      GFR est AA 40 (L) >60 ml/min/1.73m2    GFR est non-AA 33 (L) >60 ml/min/1.73m2    Calcium 8.7 8.5 - 10.1 MG/DL   CBC WITH AUTOMATED DIFF    Collection Time: 06/17/18  5:57 AM   Result Value Ref Range    WBC 7.0 3.6 - 11.0 K/uL    RBC 3.00 (L) 3.80 - 5.20 M/uL    HGB 8.3 (L) 11.5 - 16.0 g/dL    HCT 26.6 (L) 35.0 - 47.0 %    MCV 88.7 80.0 - 99.0 FL    MCH 27.7 26.0 - 34.0 PG    MCHC 31.2 30.0 - 36.5 g/dL    RDW 18.9 (H) 11.5 - 14.5 %    PLATELET 800 911 - 509 K/uL    MPV 9.2 8.9 - 12.9 FL    NRBC 0.0 0  WBC    ABSOLUTE NRBC 0.00 0.00 - 0.01 K/uL    NEUTROPHILS 75 32 - 75 %    LYMPHOCYTES 15 12 - 49 %    MONOCYTES 6 5 - 13 %    EOSINOPHILS 2 0 - 7 %    BASOPHILS 0 0 - 1 %    IMMATURE GRANULOCYTES 0 0.0 - 0.5 %    ABS. NEUTROPHILS 5.3 1.8 - 8.0 K/UL    ABS. LYMPHOCYTES 1.1 0.8 - 3.5 K/UL    ABS. MONOCYTES 0.4 0.0 - 1.0 K/UL    ABS. EOSINOPHILS 0.2 0.0 - 0.4 K/UL    ABS. BASOPHILS 0.0 0.0 - 0.1 K/UL    ABS. IMM.  GRANS. 0.0 0.00 - 0.04 K/UL    DF AUTOMATED     GLUCOSE, POC    Collection Time: 06/17/18  6:58 AM   Result Value Ref Range    Glucose (POC) 121 (H) 65 - 100 mg/dL    Performed by Can LOYA (TRENTADUE)    GLUCOSE, POC    Collection Time: 06/17/18 11:30 AM   Result Value Ref Range    Glucose (POC) 204 (H) 65 - 100 mg/dL    [facility]           Radiology:        Current Facility-Administered Medications   Medication Dose Route Frequency    insulin glargine (LANTUS) injection 10 Units  10 Units SubCUTAneous QHS    albumin human 25% (BUMINATE) solution 25 g  25 g IntraVENous ONCE    heparin (porcine) injection 5,000 Units  5,000 Units SubCUTAneous Q12H    sodium chloride (NS) flush 5-10 mL  5-10 mL IntraVENous PRN    piperacillin-tazobactam (ZOSYN) 4.5 g in 0.9% sodium chloride (MBP/ADV) 100 mL  4.5 g IntraVENous Q12H    [START ON 6/18/2018] levoFLOXacin (LEVAQUIN) 500 mg in D5W IVPB  500 mg IntraVENous Q48H    furosemide (LASIX) tablet 80 mg  80 mg Oral DAILY    methyl salicylate-menthol (BENGAY) 15-10 % cream   Topical PRN    carvedilol (COREG) tablet 6.25 mg  6.25 mg Oral BID WITH MEALS    polyethylene glycol (MIRALAX) packet 17 g  17 g Oral DAILY    insulin lispro (HUMALOG) injection   SubCUTAneous AC&HS    glucose chewable tablet 16 g  4 Tab Oral PRN    dextrose (D50W) injection syrg 12.5-25 g  12.5-25 g IntraVENous PRN    glucagon (GLUCAGEN) injection 1 mg  1 mg IntraMUSCular PRN    anastrozole (ARIMIDEX) tablet 1 mg  1 mg Oral DAILY    ascorbic acid (vitamin C) (VITAMIN C) tablet 1,000 mg  1,000 mg Oral DAILY    aspirin delayed-release tablet 81 mg  81 mg Oral DAILY    cholecalciferol (VITAMIN D3) tablet 1,000 Units  1,000 Units Oral DAILY    clopidogrel (PLAVIX) tablet 75 mg  75 mg Oral DAILY    docusate sodium (COLACE) capsule 100 mg  100 mg Oral BID    epoetin celio (EPOGEN;PROCRIT) injection 10,000 Units  10,000 Units SubCUTAneous Q MON, WED & FRI    folic acid (FOLVITE) tablet 1 mg  1 mg Oral DAILY    lactobac ac& pc-s.therm-b.anim (BERYL Q/RISAQUAD)  1 Cap Oral DAILY    levothyroxine (SYNTHROID) tablet 88 mcg  88 mcg Oral ACB    nitroglycerin (NITROSTAT) tablet 0.4 mg  0.4 mg SubLINGual PRN    sodium chloride (NS) flush 5-10 mL  5-10 mL IntraVENous Q8H    sodium chloride (NS) flush 5-10 mL  5-10 mL IntraVENous PRN    acetaminophen (TYLENOL) tablet 650 mg  650 mg Oral Q4H PRN    ondansetron (ZOFRAN) injection 4 mg  4 mg IntraVENous Q4H PRN    evolocumab (REPATHA) syringe 140 mg (Patient Supplied)  140 mg SubCUTAneous Q 14 DAYS    teriparatide (FORTEO) injection 20 mcg (Patient Supplied)  20 mcg SubCUTAneous DAILY    palbociclib cap 125 mg (Patient Supplied)  125 mg Oral DAILY WITH LUNCH    b-complex with vitamin c tablet 1 Tab (Patient Supplied)  1 Tab Oral DAILY       Neida Rodriguez, PA-S  Leanna Dykes, MARIETTA Thomas MD     [facility] [address] [place] [address] [address] [facility] [address]   [address]   [phone]

## 2018-06-17 NOTE — CONSULTS
Nephrology Consult Note     [address] [facility]     www. [email or web address]              Phone - [phone]   Patient: Patric Smith   YOB: 1935    Date- 6/17/2018  MRN: [id number]   CONSULTING PHYSICIAN: DR. REYNA           REASON FOR CONSULTATION: ARF ON CKD  ADMIT DATE:6/12/2018 PATIENT Sami Fitzgerald MD     ASSESSMENT:   · ARF multiple etiology - hypotension, diuretic use, abx use. ? Cardiorenal   · NSTEMI  · CHF, RESP failure, pulmo. Edema  · ckd 3- BL.CR. 1.2  · Metastatic breast ca  · H/o colon ca  · Anemia  ·   · Principal Problem:  ·   NSTEMI (non-ST elevated myocardial infarction) ([address] (6/1/2018)  ·   ·   PLAN:   · Give albumin   · Continue lasix  · Watch cr closely  · Avoid acei or arb  · Check urine lytes    [x] High complexity decision making was performed  [] Patient is at high-risk of decompensation with multiple organ involvement    Subjective:   HPI: Patric Smith is a 82 y.o.  female. She is admitted with sob. She is dx with NSTEMI. She had cr. Of 1.92 on 6-16-18  Her cr. On admission was 1.14  Her baseline cr is 1.2  She was given ivf yesterday and developed pulmonary edema last night requiring icu admission  She has received lisinopril,lasix ,zosyn since admission  She has episode of hypotension in last 48 hours  She reports poor po intake  No iv dye exposure  She denies sob , chest pain  Feels week  No nausea or vomiting  Cr. Trends     Ref.  Range 5/22/2018 04:46 5/23/2018 04:26 5/24/2018 02:03 5/25/2018 00:22 6/1/2018 03:45 6/1/2018 03:47 6/1/2018 06:46 6/2/2018 03:25 6/3/2018 03:10 6/4/2018 03:26 6/5/2018 15:25 6/12/2018 00:45 6/12/2018 08:43 6/12/2018 10:23 6/12/2018 17:58 6/13/2018 02:00 6/14/2018 06:59 6/15/2018 04:27 6/15/2018 20:55 6/16/2018 00:29 6/16/2018 02:00 6/17/2018 05:57   Creatinine Latest Ref Range: 0.55 - 1.02 MG/DL 1.04 (H) 0.92 1.11 (H) 1.19 (H) 1.29 (H)   1.25 (H) 1.16 (H) 1.06 (H)  1.14 (H)    1.17 (H) 1.40 (H) 1.83 (H)  1.92 (H)  1.50 (H)     Review of Systems:  A 11 point review of system was performed today. Pertinent positives and negatives are mentioned in the HPI. The reminder of the ROS is negative and noncontributory. Past Medical History:   Diagnosis Date    Anemia 9/2/2009    Colon cancer ([facility] 9/2/2009    surgery/chemo    Colon cancer ([facility] 9/2/2009    DM (diabetes mellitus) ([facility] 9/2/2009    GERD (gastroesophageal reflux disease)     H/O: CVA 9/2/2009    slight l sided weakness    Hypothyroid 9/2/2009    Ill-defined condition     seasonal allergies    Microalbuminuria 9/2/2009    Osteoporosis 9/2/2009    Other and unspecified hyperlipidemia 1/27/2010    Rheumatoid arthritis involving ankle ([facility] 9/28/2016    Rheumatoid arthritis(714.0) 9/2/2009    Unspecified essential hypertension 9/2/2009    Weakness due to cerebrovascular accident       Past Surgical History:   Procedure Laterality Date    HX COLECTOMY      HX HYSTERECTOMY      HX OTHER SURGICAL      colonoscopies numerous since 1993      Prior to Admission medications    Medication Sig Start Date End Date Taking? Authorizing Provider   FORTEO 20 mcg/dose - 600 mcg/2.4 mL pnij injection INJECT 20MCG ONCE DAILY 5/30/18  Yes Jean Madsen MD   evolocumab MAIN LINE [facility]) pen injection 1 mL by SubCUTAneous route every fourteen (14) days. 1/11/18  Yes Magda Browne MD   nitroglycerin (NITROSTAT) 0.4 mg SL tablet 1 Tab by SubLINGual route as needed for Chest Pain. Up to 3 doses. 6/6/18   Gabriel Hall MD   clopidogrel (PLAVIX) 75 mg tab Take 1 Tab by mouth daily. 5/25/18   Carlene Kraus MD   carvedilol (COREG) 6.25 mg tablet Take 1 Tab by mouth two (2) times daily (with meals). 5/11/18   Francisco Javier Cosme MD   anastrozole (ARIMIDEX) 1 mg tablet Take 1 Tab by mouth daily. 5/12/18   Francisco Javier Cosme MD   docusate sodium (COLACE) 100 mg capsule Take 1 Cap by mouth two (2) times a day for 90 days.  5/11/18 8/9/18  Francisco Javier Cosme MD   furosemide (LASIX) 20 mg tablet Take one pill daily by mouth . 5/12/18   Janes Sandhu MD   L. acidoph & paracasei- S therm- Bifido (BERYL-Q/RISAQUAD) 8 billion cell cap cap Take 1 Cap by mouth daily. 5/12/18   Janes Sandhu MD   polyethylene glycol (MIRALAX) 17 gram packet Take 1 Packet by mouth daily. 5/12/18   Janes Sandhu MD   phosphorus (K PHOS NEUTRAL) 250 mg tablet Take 1 Tab by mouth two (2) times a day. 5/11/18   Janes Sandhu MD   lisinopril (PRINIVIL, ZESTRIL) 2.5 mg tablet Take 1 Tab by mouth daily. 5/12/18   Janes Sandhu MD   epoetin celio (EPOGEN;PROCRIT) 10,000 unit/mL injection 1 mL by SubCUTAneous route every Monday, Wednesday, Friday. Indications: ANEMIA DUE TO RENAL FAILURE 5/11/18   Janes Sandhu MD   glimepiride (AMARYL) 1 mg tablet Take 1 mg by mouth every morning. Valentino Perkins MD   levothyroxine (SYNTHROID) 88 mcg tablet Take 88 mcg by mouth Daily (before breakfast). Historical Provider   etanercept (ENBREL) 50 mg/mL (0.98 mL) injection 1 mL by SubCUTAneous route every seven (7) days. 3/16/18   Hong Olvera MD   folic acid (FOLVITE) 1 mg tablet TAKE ONE TABLET BY MOUTH DAILY 10/9/17   Hong Olvera MD   sitaGLIPtin (JANUVIA) 25 mg tablet Take 50 mg by mouth daily. Historical Provider   VIT C/VIT E/LUTEIN/MIN/OMEGA-3 (OCUVITE PO) Take 1 Tab by mouth daily. Historical Provider   MAGNESIUM MALATE Take 400 mg by mouth daily. Historical Provider   MULTIVITAMIN WITH MINERALS (HAIR,SKIN & NAILS PO) Take 1 Tab by mouth daily. Historical Provider   aspirin delayed-release 81 mg tablet Take 81 mg by mouth daily. Historical Provider   OMEGA-3 FATTY ACIDS/FISH OIL (OMEGA 3 FISH OIL PO) Take 300 mg by mouth daily. Historical Provider   ascorbate calcium (MAKAYLA-C) 500 mg Tab Take 1,000 mg by mouth daily. 4/15/11   Historical Provider   CHOLECALCIFEROL, VITAMIN D3, (VITAMIN D-3 PO) Take 1,000 Units by mouth daily.     Historical Provider     Allergies   Allergen Reactions    Statins-Hmg-Coa Reductase Inhibitors Other (comments)     Intolerant to statins     Sulfa (Sulfonamide Antibiotics) Other (comments)     syncope      Social History   Substance Use Topics    Smoking status: Never Smoker    Smokeless tobacco: Never Used    Alcohol use No      Family History   Problem Relation Age of Onset    Diabetes Mother     Stroke Mother     Diabetes Sister     Other Sister      fell and hit her head -  of this   [facility] Diabetes Brother     Cancer Father      stomach    Diabetes Sister         Objective:    Vitals:    Vitals:    18 0744 18 0800 18 0900 18 1000   BP:  101/46 109/56 (!) 99/39   Pulse:  75 80 72   Resp:     Temp:       SpO2: 100% 98% 100% 100%   Weight:       Height:         I&O's:   0701 -  0700  In: 300 [I.V.:300]  Out: 1310 [Urine:1310]  Physical Exam:  General:Alert, No distress,   Eyes:No scleral icterus, No conjunctival pallor  Neck:Supple,no mass palpable,no thyromegaly  Lungs:Clears to auscultation Bilaterally, normal respiratory effort  CVS:RRR, S1 S2 normal,  No rub,  Abdomen:Soft, Non tender, No hepatosplenomegaly  Extremities:trace LE edema  Skin:No rash or lesions, Warm and DRY   Psych: appropriate affect    :  Alford +  Musculoskeletal : no redness, no joint tenderness  NEURO: Normal Speech, Non focal    CODE STATUS:  dnr  Care Plan discussed with:  Pt and family     Chart reviewed.      TOTAL TIME: 61 Minutes   y Reviewed previous records   y Discussion with patient and/or family and questions answered   y >50% of visit spent in counseling and coordination of care        ECG[de-identified] Rev:yes  Xray/CT/US/MRI REV:yes  Lab Data Personally Reviewed: (see below)    CBC: 2018: HCT 31.0 %* (Ref range: 35.0 - 47.0 %); HGB 9.6 g/dL* (Ref range: 11.5 - 16.0 g/dL); PLATELET 853 K/uL (Ref range: 150 - 400 K/uL); RBC 3.47 M/uL* (Ref range: 3.80 - 5.20 M/uL); WBC 9.3 K/uL (Ref range: 3.6 - 11.0 K/uL)  2018: HCT 26.4 %* (Ref range: 35.0 - 47.0 %);  HGB 8.2 g/dL* (Ref range: 11.5 - 16.0 g/dL); PLATELET 540 K/uL (Ref range: 150 - 400 K/uL); RBC 2.99 M/uL* (Ref range: 3.80 - 5.20 M/uL); WBC 6.6 K/uL (Ref range: 3.6 - 11.0 K/uL)   BMP: 6/12/2018: BUN 33 MG/DL* (Ref range: 6 - 20 MG/DL); Calcium 9.4 MG/DL (Ref range: 8.5 - 10.1 MG/DL); Chloride 99 mmol/L (Ref range: 97 - 108 mmol/L); CO2 26 mmol/L (Ref range: 21 - 32 mmol/L); Creatinine 1.14 MG/DL* (Ref range: 0.55 - 1.02 MG/DL); Glucose 326 mg/dL* (Ref range: 65 - 100 mg/dL); Potassium 4.6 mmol/L (Ref range: 3.5 - 5.1 mmol/L); Sodium 133 mmol/L* (Ref range: 136 - 145 mmol/L)  6/13/2018: BUN 45 MG/DL* (Ref range: 6 - 20 MG/DL); Calcium 8.1 MG/DL* (Ref range: 8.5 - 10.1 MG/DL); Chloride 102 mmol/L (Ref range: 97 - 108 mmol/L); CO2 26 mmol/L (Ref range: 21 - 32 mmol/L); Creatinine 1.17 MG/DL* (Ref range: 0.55 - 1.02 MG/DL); Glucose 180 mg/dL* (Ref range: 65 - 100 mg/dL); Potassium 4.7 mmol/L (Ref range: 3.5 - 5.1 mmol/L);  Sodium 137 mmol/L (Ref range: 136 - 145 mmol/L)  Recent Labs      06/17/18   0557  06/16/18   0200  06/16/18   0029  06/15/18   0427   NA  137   --   134*  133*   K  4.2   --   4.8  4.7   CL  104   --   97  101   CO2  24   --   25  23   GLU  91   --   402*  235*   BUN  62*   --   74*  66*   CREA  1.50*   --   1.92*  1.83*   CA  8.7   --   8.7  8.9   MG   --   2.0   --    --    ALB   --    --   2.7*   --    SGOT   --    --   18   --    ALT   --    --   24   --      Recent Labs      06/17/18   0557  06/16/18   0029   WBC  7.0  9.2   HGB  8.3*  10.2*   HCT  26.6*  33.2*   PLT  271  358     Lab Results   Component Value Date/Time    Color YELLOW/STRAW 06/16/2018 12:29 AM    Appearance CLOUDY (A) 06/16/2018 12:29 AM    Specific gravity 1.010 06/16/2018 12:29 AM    pH (UA) 6.0 06/16/2018 12:29 AM    Protein TRACE (A) 06/16/2018 12:29 AM    Glucose 100 (A) 06/16/2018 12:29 AM    Ketone NEGATIVE  06/16/2018 12:29 AM    Bilirubin NEGATIVE  06/16/2018 12:29 AM    Urobilinogen 0.2 06/16/2018 12:29 AM    Nitrites NEGATIVE 06/16/2018 12:29 AM    Leukocyte Esterase LARGE (A) 06/16/2018 12:29 AM    Epithelial cells FEW 06/16/2018 12:29 AM    Bacteria 1+ (A) 06/16/2018 12:29 AM    WBC 20-50 06/16/2018 12:29 AM    RBC 5-10 06/16/2018 12:29 AM     Lab Results   Component Value Date/Time    AST (SGOT) 18 06/16/2018 12:29 AM    Alk. phosphatase 670 (H) 06/16/2018 12:29 AM    Protein, total 8.8 (H) 06/16/2018 12:29 AM    Albumin 2.7 (L) 06/16/2018 12:29 AM    Globulin 6.1 (H) 06/16/2018 12:29 AM     Lab Results   Component Value Date/Time    INR 1.1 05/19/2018 03:10 AM    Prothrombin time 11.4 (H) 05/19/2018 03:10 AM    aPTT 56.7 (H) 06/13/2018 02:00 AM      Lab Results   Component Value Date/Time    Iron 12 (L) 04/19/2018 06:09 PM    TIBC 232 (L) 04/19/2018 06:09 PM    Iron % saturation 5 (L) 04/19/2018 06:09 PM    Ferritin 424 (H) 04/19/2018 06:09 PM     Lab Results   Component Value Date/Time    Culture result: (A) 06/16/2018 12:27 AM     GRAM POSITIVE COCCI IN CLUSTERS GROWING IN 1 OF 4 BOTTLES DRAWN SITE = R ARM    Culture result: REMAINING BOTTLE(S) HAS/HAVE NO GROWTH SO FAR 06/16/2018 12:27 AM    Culture result: MRSA NOT PRESENT 05/19/2018 10:09 PM    Culture result:  05/19/2018 10:09 PM         Screening of patient nares for MRSA is for surveillance purposes and, if positive, to facilitate isolation considerations in high risk settings. It is not intended for automatic decolonization interventions per se as regimens are not sufficiently effective to warrant routine use. Prior to Admission Medications   Prescriptions Last Dose Informant Patient Reported? Taking? CHOLECALCIFEROL, VITAMIN D3, (VITAMIN D-3 PO)   Yes No   Sig: Take 1,000 Units by mouth daily. FORTEO 20 mcg/dose - 600 mcg/2.4 mL pnij injection 6/11/2018 at 2100  No Yes   Sig: INJECT 20MCG ONCE DAILY   L. acidoph & paracasei- S therm- Bifido (BERYL-Q/RISAQUAD) 8 billion cell cap cap   No No   Sig: Take 1 Cap by mouth daily.    MAGNESIUM MALATE   Yes No   Sig: Take 400 mg by mouth daily. MULTIVITAMIN WITH MINERALS (HAIR,SKIN & NAILS PO)   Yes No   Sig: Take 1 Tab by mouth daily. OMEGA-3 FATTY ACIDS/FISH OIL (OMEGA 3 FISH OIL PO)   Yes No   Sig: Take 300 mg by mouth daily. VIT C/VIT E/LUTEIN/MIN/OMEGA-3 (OCUVITE PO)   Yes No   Sig: Take 1 Tab by mouth daily. anastrozole (ARIMIDEX) 1 mg tablet   No No   Sig: Take 1 Tab by mouth daily. ascorbate calcium (MAKAYLA-C) 500 mg Tab   Yes No   Sig: Take 1,000 mg by mouth daily. aspirin delayed-release 81 mg tablet   Yes No   Sig: Take 81 mg by mouth daily. carvedilol (COREG) 6.25 mg tablet   No No   Sig: Take 1 Tab by mouth two (2) times daily (with meals). clopidogrel (PLAVIX) 75 mg tab   No No   Sig: Take 1 Tab by mouth daily. docusate sodium (COLACE) 100 mg capsule   No No   Sig: Take 1 Cap by mouth two (2) times a day for 90 days. epoetin celio (EPOGEN;PROCRIT) 10,000 unit/mL injection   No No   Si mL by SubCUTAneous route every Monday, Wednesday, Friday. Indications: ANEMIA DUE TO RENAL FAILURE   etanercept (ENBREL) 50 mg/mL (0.98 mL) injection 18  No No   Si mL by SubCUTAneous route every seven (7) days. evolocumab (REPATHA SURECLICK) pen injection  at Unknown time  No Yes   Si mL by SubCUTAneous route every fourteen (14) days. folic acid (FOLVITE) 1 mg tablet   No No   Sig: TAKE ONE TABLET BY MOUTH DAILY   furosemide (LASIX) 20 mg tablet   No No   Sig: Take one pill daily by mouth .   glimepiride (AMARYL) 1 mg tablet   Yes No   Sig: Take 1 mg by mouth every morning. levothyroxine (SYNTHROID) 88 mcg tablet   Yes No   Sig: Take 88 mcg by mouth Daily (before breakfast). lisinopril (PRINIVIL, ZESTRIL) 2.5 mg tablet   No No   Sig: Take 1 Tab by mouth daily. nitroglycerin (NITROSTAT) 0.4 mg SL tablet   No No   Si Tab by SubLINGual route as needed for Chest Pain. Up to 3 doses. phosphorus (K PHOS NEUTRAL) 250 mg tablet   No No   Sig: Take 1 Tab by mouth two (2) times a day. polyethylene glycol (MIRALAX) 17 gram packet   No No   Sig: Take 1 Packet by mouth daily. sitaGLIPtin (JANUVIA) 25 mg tablet   Yes No   Sig: Take 50 mg by mouth daily. Facility-Administered Medications: None     Imaging:    Medications list Personally Reviewed   [x]      Yes     []               No    Thank you for allowing us to participate in the care this patient. We will follow patient with you. Signed By: Keo Badillo MD  [facility]  [facility], [address], [address]  Phone - [phone]         Fax - [phone] [facility] Office  [address] [place] [address]  Phone - [phone]        Fax - [phone]     www. [email or web address]

## 2018-06-17 NOTE — PROGRESS NOTES
Bedside and Verbal shift change report given to Dominick Gaffney RN (oncoming nurse) by Sukumar Felder RN (offgoing nurse). Report included the following information SBAR, Kardex, ED Summary, OR Summary, Procedure Summary, Intake/Output, MAR, Accordion, Recent Results, Med Rec Status and Cardiac Rhythm NSR. Bedside and Verbal shift change report given to Chrissy Yanes RN (oncoming nurse) by Dominick Gaffney RN (offgoing nurse). Report included the following information SBAR, Kardex, ED Summary, OR Summary, Procedure Summary, Intake/Output, MAR, Accordion, Recent Results, Med Rec Status and Cardiac Rhythm NSR.

## 2018-06-17 NOTE — PROGRESS NOTES
Hospitalist Progress Note  Brittny Samson MD  Answering service: [phone] from in house phone         Date of Service:  2018  NAME:  Jamal Lange  :  1935  MRN:  [id number]      Admission Summary:   CHIEF COMPLAINT:  Shortness of breath.       HISTORY OF PRESENT ILLNESS:  This is an 70-year-old woman with a past medical history significant for coronary artery disease, chronic systolic congestive heart failure, metastatic left breast cancer, hypertension, type 2 diabetes, hypothyroidism, dyslipidemia, rheumatoid arthritis, chronic kidney disease who was in her usual state of health until the day of her presentation at the emergency room when the patient developed shortness of breath at the rehab facility where the patient is receiving rehabilitation. The patient has had 4 myocardial infarctions since April of this year, each episode presenting with shortness of breath. The patient's son was present at [facility] where the patient is receiving rehabilitation when she developed shortness of breath. She received 3 doses of nitroglycerin, and he advised the facility to send the patient to the emergency room for further evaluation. It was reported that the patient's oxygen saturation was 87%. The patient has had multiple admissions to this hospital for evaluation and treatment of a non-ST elevation myocardial infarction. The last admission to this hospital was from 2018 to 2018. The patient was admitted and treated for non-ST elevation myocardial cardiac infarction. Since April, the patient has been having recurrent myocardial infarctions. She has had a cardiac catheterization that shows a significant lesion, but patient is not a candidate for surgical intervention or PCI/stent placement. When the patient arrived at the emergency room, she was found to have an elevated troponin level.   The patient was referred to the hospitalist service for evaluation for admission. No history of fever, no rigors and no chills. Interval history / Subjective:       6/13:  Usual breast cancer related lt lat chest pain, pt usually up walking at rehab per her report, will obtain 6 min walk 02 test.     1/14:  Slight bump in Cr. Pt is likely to go hospice soon  dispo to accepting snf/rehab today , can f/u on Cr and dose lasix accordingly on discharge with close f/u lab. If here in am (likely) will repeat the Cr /K ,/Na ( now 133) and go from there, agree no lasix today. 6/15:  Gouty presentation rt wrist resolved, pred 20 for now daily,    Cr up a bit,  Bolus 250 ns and hold diuretic, and f/u alb  Case advised best dc date 6/16 + and pt needs to participate with PT ,     6/16: Moved to icu last pm 2n2 flash pul edema resolved quickly on and off bipap  Renal numbers a bit up to day, no routine lasix ordered, lung are clear, received 60 mg total lasix last pm 2300     6/17:  Stable exam, for down grade to MICU,  Lasix at 80 mg daily started by card. Assessment & Plan:     NSTMI. Stable,  Elevated troponin/CAD: severe multivessel disease  -Troponin 0.16 (less than last admission- was 1.44)  -12 lead EKG:  ST, lateral ST/T wave abnormality, not significantly changed from 6/3  -Chest pain resolved   -Not candidate for CABG or intervention.  -Continue ASA, plavix, BB, low dose ace-Iand repatha (if family able to bring in). No statin due to intolerance in past.  -Will add Imdur 30 mg daily   on coreg  But   Lisinopril held 2n2 to renal issues  Same 6/17/2018     Acute on chronic syst HF  NYHA IV EF 30 % NWMA,( reduced form 40 % on [facility] 5/21)   Left ventricle: Systolic function was moderately to markedly reduced. Ejection fraction was estimated to be 30 %. No obvious wall motion  abnormalities identified in the views obtained.   -Moved to icu l6/15 pm  2n2 flash pul edema resolved quickly on and off bipap  Renal numbers a bit up to day, no routine lasix ordered, lung are clear, received 60 mg total lasix last pm 2300 , now on lasix 80 mg dialy as of 6/17/2018       Probable gout rt wrist/hand  prednison 40 mg x 1 , monitor. F/u on taper dose prednisone on discharge. ( one dose naprosyn 6/14/2018  , will add prn norco as well on discharge) f/u with rhematology as needed as an out pt. For pred 20 mg daily for now, and taper soon. ? On discharge. Off pred, inflammation resolved. CKD 3  Lab Results   Component Value Date/Time    Creatinine 1.50 (H) 06/17/2018 05:57 AM    holding diuretic and f/u lab and giving 250 cc bolus      HTN, on coreg  But   Lisinopril held 2n2 to renal issues. BP Readings from Last 1 Encounters:   06/17/18 111/41      Hyperglycemia , worst with pred for gout,  SSI, and coverage as needed  Lab Results   Component Value Date/Time    Glucose 91 06/17/2018 05:57 AM    Glucose (POC) 121 (H) 06/17/2018 06:58 AM    better as steroids removed and on Lantus with dose to be reduced. DM on SSI> lantus added 6/17/2018     Anemia, chronic   Lab Results   Component Value Date/Time    HGB 8.3 (L) 06/17/2018 05:57 AM        Metastatic breast cancer, crurent and colon cancer followed by hem/onc    Code status: DNR  DVT prophylaxis: heparin     Care Plan discussed with: Patient/Family and Nurse  Disposition: SNF/LTC and TBD discussing with cm and son( to call after pt/ot eval )     Hospital Problems  Date Reviewed: 6/12/2018          Codes Class Noted POA    * (Principal)NSTEMI (non-ST elevated myocardial infarction) ([facility] [address] ICD-10-CM: I21.4  ICD-9-CM: 410.70  6/1/2018 Yes                Review of Systems:   A comprehensive review of systems was negative. resolved pain/swelling rt wrist.  Not sob now in icu off bipap again, and for down grade to IMCU, did not use bipap last pm       Vital Signs:    Last 24hrs VS reviewed since prior progress note.  Most recent are:  Visit Vitals    /41    Pulse 78    Temp 98.6 °F (37 °C)    Resp 20    Ht 5' 1\" (1.549 m)    Wt 56.3 kg (124 lb 1.9 oz)    SpO2 100%    BMI 23.45 kg/m2         Intake/Output Summary (Last 24 hours) at 06/17/18 0953  Last data filed at 06/17/18 0700   Gross per 24 hour   Intake              200 ml   Output              860 ml   Net             -660 ml        Physical Examination:             Constitutional:  No acute distress, cooperative, pleasant    ENT:  Oral mucous moist, oropharynx benign. Neck supple,    Resp:  CTA bilaterally. No wheezing/rhonchi/rales. No accessory muscle use   CV:  Regular rhythm, normal rate, no definite  gallops, rubs    GI:  Soft, non distended, non tender. normoactive bowel sounds, no hepatosplenomegaly     Musculoskeletal:  No edema, warm, 1+ pulses throughout    Neurologic:  Moves all extremities. AAOx3, CN II-XII reviewed     Psych:  Good insight, Not anxious nor agitated. Skin:  Good turgor, no rashes or ulcers       Data Review:    Review and/or order of clinical lab test      Labs:     Recent Labs      06/17/18   0557  06/16/18   0029   WBC  7.0  9.2   HGB  8.3*  10.2*   HCT  26.6*  33.2*   PLT  271  358     Recent Labs      06/17/18   0557  06/16/18   0200  06/16/18   0029  06/15/18   0427   NA  137   --   134*  133*   K  4.2   --   4.8  4.7   CL  104   --   97  101   CO2  24   --   25  23   BUN  62*   --   74*  66*   CREA  1.50*   --   1.92*  1.83*   GLU  91   --   402*  235*   CA  8.7   --   8.7  8.9   MG   --   2.0   --    --      Recent Labs      06/16/18   0029   SGOT  18   ALT  24   AP  670*   TBILI  0.4   TP  8.8*   ALB  2.7*   GLOB  6.1*     No results for input(s): INR, PTP, APTT in the last 72 hours. No lab exists for component: INREXT, INREXT   No results for input(s): FE, TIBC, PSAT, FERR in the last 72 hours. No results found for: FOL, RBCF   No results for input(s): PH, PCO2, PO2 in the last 72 hours.   Recent Labs      06/15/18   2055   TROIQ  <0.05     Lab Results   Component Value Date/Time Cholesterol, total 74 04/16/2018 04:21 AM    HDL Cholesterol 25 04/16/2018 04:21 AM    LDL, calculated 31.6 04/16/2018 04:21 AM    Triglyceride 87 04/16/2018 04:21 AM    CHOL/HDL Ratio 3.0 04/16/2018 04:21 AM     Lab Results   Component Value Date/Time    Glucose (POC) 121 (H) 06/17/2018 06:58 AM    Glucose (POC) 264 (H) 06/16/2018 10:10 PM    Glucose (POC) 286 (H) 06/16/2018 05:05 PM    Glucose (POC) 241 (H) 06/16/2018 12:14 PM    Glucose (POC) 368 (H) 06/16/2018 07:07 AM     Lab Results   Component Value Date/Time    Color YELLOW/STRAW 06/16/2018 12:29 AM    Appearance CLOUDY (A) 06/16/2018 12:29 AM    Specific gravity 1.010 06/16/2018 12:29 AM    pH (UA) 6.0 06/16/2018 12:29 AM    Protein TRACE (A) 06/16/2018 12:29 AM    Glucose 100 (A) 06/16/2018 12:29 AM    Ketone NEGATIVE  06/16/2018 12:29 AM    Bilirubin NEGATIVE  06/16/2018 12:29 AM    Urobilinogen 0.2 06/16/2018 12:29 AM    Nitrites NEGATIVE  06/16/2018 12:29 AM    Leukocyte Esterase LARGE (A) 06/16/2018 12:29 AM    Epithelial cells FEW 06/16/2018 12:29 AM    Bacteria 1+ (A) 06/16/2018 12:29 AM    WBC 20-50 06/16/2018 12:29 AM    RBC 5-10 06/16/2018 12:29 AM         Medications Reviewed:     Current Facility-Administered Medications   Medication Dose Route Frequency    sodium chloride (NS) flush 5-10 mL  5-10 mL IntraVENous PRN    piperacillin-tazobactam (ZOSYN) 4.5 g in 0.9% sodium chloride (MBP/ADV) 100 mL  4.5 g IntraVENous Q12H    [START ON 6/18/2018] levoFLOXacin (LEVAQUIN) 500 mg in D5W IVPB  500 mg IntraVENous Q48H    furosemide (LASIX) tablet 80 mg  80 mg Oral DAILY    insulin glargine (LANTUS) injection 15 Units  15 Units SubCUTAneous QHS    methyl salicylate-menthol (BENGAY) 15-10 % cream   Topical PRN    carvedilol (COREG) tablet 6.25 mg  6.25 mg Oral BID WITH MEALS    polyethylene glycol (MIRALAX) packet 17 g  17 g Oral DAILY    heparin (porcine) injection 5,000 Units  5,000 Units SubCUTAneous Q8H    insulin lispro (HUMALOG) injection SubCUTAneous AC&HS    glucose chewable tablet 16 g  4 Tab Oral PRN    dextrose (D50W) injection syrg 12.5-25 g  12.5-25 g IntraVENous PRN    glucagon (GLUCAGEN) injection 1 mg  1 mg IntraMUSCular PRN    anastrozole (ARIMIDEX) tablet 1 mg  1 mg Oral DAILY    ascorbic acid (vitamin C) (VITAMIN C) tablet 1,000 mg  1,000 mg Oral DAILY    aspirin delayed-release tablet 81 mg  81 mg Oral DAILY    cholecalciferol (VITAMIN D3) tablet 1,000 Units  1,000 Units Oral DAILY    clopidogrel (PLAVIX) tablet 75 mg  75 mg Oral DAILY    docusate sodium (COLACE) capsule 100 mg  100 mg Oral BID    epoetin celio (EPOGEN;PROCRIT) injection 10,000 Units  10,000 Units SubCUTAneous Q MON, WED & FRI    folic acid (FOLVITE) tablet 1 mg  1 mg Oral DAILY    lactobac ac& pc-s.therm-b.anim (BERYL Q/RISAQUAD)  1 Cap Oral DAILY    levothyroxine (SYNTHROID) tablet 88 mcg  88 mcg Oral ACB    nitroglycerin (NITROSTAT) tablet 0.4 mg  0.4 mg SubLINGual PRN    sodium chloride (NS) flush 5-10 mL  5-10 mL IntraVENous Q8H    sodium chloride (NS) flush 5-10 mL  5-10 mL IntraVENous PRN    acetaminophen (TYLENOL) tablet 650 mg  650 mg Oral Q4H PRN    ondansetron (ZOFRAN) injection 4 mg  4 mg IntraVENous Q4H PRN    evolocumab (REPATHA) syringe 140 mg (Patient Supplied)  140 mg SubCUTAneous Q 14 DAYS    teriparatide (FORTEO) injection 20 mcg (Patient Supplied)  20 mcg SubCUTAneous DAILY    palbociclib cap 125 mg (Patient Supplied)  125 mg Oral DAILY WITH LUNCH    b-complex with vitamin c tablet 1 Tab (Patient Supplied)  1 Tab Oral DAILY     ______________________________________________________________________  EXPECTED LENGTH OF STAY: 4d 7h  ACTUAL LENGTH OF STAY:          5                 Mendez Manzo MD

## 2018-06-18 ENCOUNTER — APPOINTMENT (OUTPATIENT)
Dept: GENERAL RADIOLOGY | Age: 83
DRG: 189 | End: 2018-06-18
Attending: NURSE PRACTITIONER
Payer: MEDICARE

## 2018-06-18 PROBLEM — I21.4 NSTEMI (NON-ST ELEVATED MYOCARDIAL INFARCTION) (HCC): Status: RESOLVED | Noted: 2018-06-01 | Resolved: 2018-06-18

## 2018-06-18 LAB
ANION GAP SERPL CALC-SCNC: 13 MMOL/L (ref 5–15)
ATRIAL RATE: 104 BPM
ATRIAL RATE: 95 BPM
BASOPHILS # BLD: 0 K/UL (ref 0–0.1)
BASOPHILS NFR BLD: 0 % (ref 0–1)
BUN SERPL-MCNC: 51 MG/DL (ref 6–20)
BUN/CREAT SERPL: 36 (ref 12–20)
CALCIUM SERPL-MCNC: 8.8 MG/DL (ref 8.5–10.1)
CALCULATED P AXIS, ECG09: 54 DEGREES
CALCULATED P AXIS, ECG09: 63 DEGREES
CALCULATED R AXIS, ECG10: 59 DEGREES
CALCULATED R AXIS, ECG10: 75 DEGREES
CALCULATED T AXIS, ECG11: -78 DEGREES
CALCULATED T AXIS, ECG11: 126 DEGREES
CHLORIDE SERPL-SCNC: 104 MMOL/L (ref 97–108)
CO2 SERPL-SCNC: 25 MMOL/L (ref 21–32)
CREAT SERPL-MCNC: 1.41 MG/DL (ref 0.55–1.02)
DIAGNOSIS, 93000: NORMAL
DIAGNOSIS, 93000: NORMAL
DIFFERENTIAL METHOD BLD: ABNORMAL
EOSINOPHIL # BLD: 0.1 K/UL (ref 0–0.4)
EOSINOPHIL NFR BLD: 2 % (ref 0–7)
ERYTHROCYTE [DISTWIDTH] IN BLOOD BY AUTOMATED COUNT: 19.1 % (ref 11.5–14.5)
GLUCOSE BLD STRIP.AUTO-MCNC: 105 MG/DL (ref 65–100)
GLUCOSE BLD STRIP.AUTO-MCNC: 184 MG/DL (ref 65–100)
GLUCOSE BLD STRIP.AUTO-MCNC: 255 MG/DL (ref 65–100)
GLUCOSE BLD STRIP.AUTO-MCNC: 316 MG/DL (ref 65–100)
GLUCOSE SERPL-MCNC: 99 MG/DL (ref 65–100)
HCT VFR BLD AUTO: 25.1 % (ref 35–47)
HGB BLD-MCNC: 7.8 G/DL (ref 11.5–16)
IMM GRANULOCYTES # BLD: 0 K/UL (ref 0–0.04)
IMM GRANULOCYTES NFR BLD AUTO: 0 % (ref 0–0.5)
LYMPHOCYTES # BLD: 1.1 K/UL (ref 0.8–3.5)
LYMPHOCYTES NFR BLD: 19 % (ref 12–49)
MCH RBC QN AUTO: 27.4 PG (ref 26–34)
MCHC RBC AUTO-ENTMCNC: 31.1 G/DL (ref 30–36.5)
MCV RBC AUTO: 88.1 FL (ref 80–99)
MONOCYTES # BLD: 0.4 K/UL (ref 0–1)
MONOCYTES NFR BLD: 7 % (ref 5–13)
NEUTS SEG # BLD: 4.2 K/UL (ref 1.8–8)
NEUTS SEG NFR BLD: 71 % (ref 32–75)
NRBC # BLD: 0 K/UL (ref 0–0.01)
NRBC BLD-RTO: 0 PER 100 WBC
P-R INTERVAL, ECG05: 152 MS
P-R INTERVAL, ECG05: 154 MS
PLATELET # BLD AUTO: 234 K/UL (ref 150–400)
PMV BLD AUTO: 9.7 FL (ref 8.9–12.9)
POTASSIUM SERPL-SCNC: 3.9 MMOL/L (ref 3.5–5.1)
Q-T INTERVAL, ECG07: 340 MS
Q-T INTERVAL, ECG07: 362 MS
QRS DURATION, ECG06: 102 MS
QRS DURATION, ECG06: 86 MS
QTC CALCULATION (BEZET), ECG08: 447 MS
QTC CALCULATION (BEZET), ECG08: 454 MS
RBC # BLD AUTO: 2.85 M/UL (ref 3.8–5.2)
SERVICE CMNT-IMP: ABNORMAL
SODIUM SERPL-SCNC: 142 MMOL/L (ref 136–145)
VENTRICULAR RATE, ECG03: 104 BPM
VENTRICULAR RATE, ECG03: 95 BPM
WBC # BLD AUTO: 5.9 K/UL (ref 3.6–11)

## 2018-06-18 PROCEDURE — 74011250637 HC RX REV CODE- 250/637: Performed by: SPECIALIST

## 2018-06-18 PROCEDURE — 74011250636 HC RX REV CODE- 250/636: Performed by: SPECIALIST

## 2018-06-18 PROCEDURE — 80048 BASIC METABOLIC PNL TOTAL CA: CPT | Performed by: INTERNAL MEDICINE

## 2018-06-18 PROCEDURE — 74011250637 HC RX REV CODE- 250/637: Performed by: NURSE PRACTITIONER

## 2018-06-18 PROCEDURE — 74011636637 HC RX REV CODE- 636/637: Performed by: INTERNAL MEDICINE

## 2018-06-18 PROCEDURE — 71045 X-RAY EXAM CHEST 1 VIEW: CPT

## 2018-06-18 PROCEDURE — 97116 GAIT TRAINING THERAPY: CPT

## 2018-06-18 PROCEDURE — 74011250636 HC RX REV CODE- 250/636: Performed by: FAMILY MEDICINE

## 2018-06-18 PROCEDURE — 74011250636 HC RX REV CODE- 250/636: Performed by: NURSE PRACTITIONER

## 2018-06-18 PROCEDURE — 74011000258 HC RX REV CODE- 258: Performed by: FAMILY MEDICINE

## 2018-06-18 PROCEDURE — 97535 SELF CARE MNGMENT TRAINING: CPT

## 2018-06-18 PROCEDURE — 74011250637 HC RX REV CODE- 250/637: Performed by: INTERNAL MEDICINE

## 2018-06-18 PROCEDURE — 85025 COMPLETE CBC W/AUTO DIFF WBC: CPT | Performed by: INTERNAL MEDICINE

## 2018-06-18 PROCEDURE — 82962 GLUCOSE BLOOD TEST: CPT

## 2018-06-18 PROCEDURE — 74011250636 HC RX REV CODE- 250/636: Performed by: INTERNAL MEDICINE

## 2018-06-18 PROCEDURE — 36415 COLL VENOUS BLD VENIPUNCTURE: CPT | Performed by: INTERNAL MEDICINE

## 2018-06-18 PROCEDURE — 74011250637 HC RX REV CODE- 250/637: Performed by: FAMILY MEDICINE

## 2018-06-18 PROCEDURE — 65660000000 HC RM CCU STEPDOWN

## 2018-06-18 RX ORDER — PREDNISONE 20 MG/1
20 TABLET ORAL
Status: DISCONTINUED | OUTPATIENT
Start: 2018-06-18 | End: 2018-06-21 | Stop reason: HOSPADM

## 2018-06-18 RX ORDER — FUROSEMIDE 10 MG/ML
40 INJECTION INTRAMUSCULAR; INTRAVENOUS ONCE
Status: COMPLETED | OUTPATIENT
Start: 2018-06-18 | End: 2018-06-18

## 2018-06-18 RX ADMIN — HEPARIN SODIUM 5000 UNITS: 5000 INJECTION, SOLUTION INTRAVENOUS; SUBCUTANEOUS at 20:23

## 2018-06-18 RX ADMIN — ACETAMINOPHEN 650 MG: 325 TABLET ORAL at 16:11

## 2018-06-18 RX ADMIN — INSULIN GLARGINE 10 UNITS: 100 INJECTION, SOLUTION SUBCUTANEOUS at 21:43

## 2018-06-18 RX ADMIN — FUROSEMIDE 80 MG: 40 TABLET ORAL at 09:44

## 2018-06-18 RX ADMIN — ACETAMINOPHEN 650 MG: 325 TABLET ORAL at 20:24

## 2018-06-18 RX ADMIN — INSULIN LISPRO 5 UNITS: 100 INJECTION, SOLUTION INTRAVENOUS; SUBCUTANEOUS at 16:23

## 2018-06-18 RX ADMIN — FOLIC ACID 1 MG: 1 TABLET ORAL at 09:43

## 2018-06-18 RX ADMIN — DOCUSATE SODIUM 100 MG: 100 CAPSULE, LIQUID FILLED ORAL at 06:35

## 2018-06-18 RX ADMIN — Medication 1 CAPSULE: at 09:44

## 2018-06-18 RX ADMIN — CARVEDILOL 6.25 MG: 6.25 TABLET, FILM COATED ORAL at 16:11

## 2018-06-18 RX ADMIN — DOCUSATE SODIUM 100 MG: 100 CAPSULE, LIQUID FILLED ORAL at 09:45

## 2018-06-18 RX ADMIN — DOCUSATE SODIUM 100 MG: 100 CAPSULE, LIQUID FILLED ORAL at 18:49

## 2018-06-18 RX ADMIN — POLYETHYLENE GLYCOL 3350 17 G: 17 POWDER, FOR SOLUTION ORAL at 09:12

## 2018-06-18 RX ADMIN — INSULIN LISPRO 4 UNITS: 100 INJECTION, SOLUTION INTRAVENOUS; SUBCUTANEOUS at 21:43

## 2018-06-18 RX ADMIN — CLOPIDOGREL BISULFATE 75 MG: 75 TABLET ORAL at 09:44

## 2018-06-18 RX ADMIN — ASPIRIN 81 MG: 81 TABLET, COATED ORAL at 09:44

## 2018-06-18 RX ADMIN — LEVOFLOXACIN 500 MG: 5 INJECTION, SOLUTION INTRAVENOUS at 00:23

## 2018-06-18 RX ADMIN — INSULIN LISPRO 2 UNITS: 100 INJECTION, SOLUTION INTRAVENOUS; SUBCUTANEOUS at 12:31

## 2018-06-18 RX ADMIN — LEVOTHYROXINE SODIUM 88 MCG: 88 TABLET ORAL at 06:34

## 2018-06-18 RX ADMIN — CARVEDILOL 6.25 MG: 6.25 TABLET, FILM COATED ORAL at 09:44

## 2018-06-18 RX ADMIN — Medication 10 ML: at 21:44

## 2018-06-18 RX ADMIN — FUROSEMIDE 40 MG: 10 INJECTION, SOLUTION INTRAMUSCULAR; INTRAVENOUS at 16:11

## 2018-06-18 RX ADMIN — HEPARIN SODIUM 5000 UNITS: 5000 INJECTION, SOLUTION INTRAVENOUS; SUBCUTANEOUS at 09:44

## 2018-06-18 RX ADMIN — PREDNISONE 20 MG: 20 TABLET ORAL at 12:27

## 2018-06-18 RX ADMIN — OXYCODONE HYDROCHLORIDE AND ACETAMINOPHEN 1000 MG: 500 TABLET ORAL at 09:44

## 2018-06-18 RX ADMIN — Medication 10 ML: at 06:00

## 2018-06-18 RX ADMIN — PIPERACILLIN SODIUM AND TAZOBACTAM SODIUM 4.5 G: 4; .5 INJECTION, POWDER, LYOPHILIZED, FOR SOLUTION INTRAVENOUS at 01:09

## 2018-06-18 RX ADMIN — CHOLECALCIFEROL TAB 10 MCG (400 UNIT) 1000 UNITS: 10 TAB at 09:44

## 2018-06-18 RX ADMIN — PIPERACILLIN SODIUM AND TAZOBACTAM SODIUM 4.5 G: 4; .5 INJECTION, POWDER, LYOPHILIZED, FOR SOLUTION INTRAVENOUS at 12:27

## 2018-06-18 RX ADMIN — ERYTHROPOIETIN 10000 UNITS: 10000 INJECTION, SOLUTION INTRAVENOUS; SUBCUTANEOUS at 16:24

## 2018-06-18 RX ADMIN — POLYETHYLENE GLYCOL 3350 17 G: 17 POWDER, FOR SOLUTION ORAL at 06:35

## 2018-06-18 RX ADMIN — Medication 10 ML: at 14:00

## 2018-06-18 RX ADMIN — ANASTROZOLE 1 MG: 1 TABLET, COATED ORAL at 09:48

## 2018-06-18 RX ADMIN — Medication 1 TABLET: at 09:46

## 2018-06-18 NOTE — PROGRESS NOTES
Nephrology Progress Note  Sw[place]ara Field  Date of Admission : 6/12/2018    CC: Follow up for JEROD       Assessment and Plan     JEROD on CKD:  - 2/2 ATN + labile HTN and diuretics   - Cr stable now, BP stable  - cont current diuretics  - daily labs for now    CKD Stage III :  - baseline Cr 0.9-1 mg/dl lately       NSTEMI :  - medical management per cardiology    Acute Systolic HF:  - cont current diuresis    Resp Failure:  - improving  - on BiPAP QHS  - per pulm      Metastatic Breast Ca:  - per oncology     Anemia :  - per oncology       Interval History:  Seen and examined. Up and eating breakfast.  Cr improving, UOP stable. BP stable this AM.     Current Medications: all current  Medications have been eviewed in EPIC  Review of Systems: Pertinent items are noted in HPI. Objective:  Vitals:    Vitals:    06/18/18 0400 06/18/18 0500 06/18/18 0600 06/18/18 0700   BP: 116/49 115/63 95/48 107/47   Pulse: 85 88 77 81   Resp: 17 20 18 19   Temp: 98.3 °F (36.8 °C)      SpO2: 100% 99% 100% 97%   Weight: 55.5 kg (122 lb 5.7 oz)      Height:         Intake and Output:     06/16 1901 - 06/18 0700  In: 400 [I.V.:400]  Out: 1710 [Urine:1710]    Physical Examination:  Pt intubated     No  General: NAD,Conversant, frail  Neck:  Supple, no mass  Resp:  Lungs CTA B/L, no wheezing , normal respiratory effort  CV:  RRR,  no murmur or rub, no LE edema  GI:  Soft, NT, + Bowel sounds, no hepatosplenomegaly  Neurologic:  Non focal  Psych:             Mild confusion, normal mood and affect  Skin:  No Rash    []    High complexity decision making was performed  []    Patient is at high-risk of decompensation with multiple organ involvement    Lab Data Personally Reviewed: I have reviewed all the pertinent labs, microbiology data and radiology studies during assessment.     Recent Labs      06/18/18   0457  06/17/18   0557  06/16/18   0200  06/16/18   0029   NA  142  137   --   134*   K  3.9  4.2   --   4.8   CL  104  104   --   97   CO2 25  24   --   25   GLU  99  91   --   402*   BUN  51*  62*   --   74*   CREA  1.41*  1.50*   --   1.92*   CA  8.8  8.7   --   8.7   MG   --    --   2.0   --    ALB   --    --    --   2.7*   SGOT   --    --    --   18   ALT   --    --    --   24     Recent Labs      06/18/18   0457  06/17/18   0557  06/16/18   0029   WBC  5.9  7.0  9.2   HGB  7.8*  8.3*  10.2*   HCT  25.1*  26.6*  33.2*   PLT  234  271  358     No results found for: SDES  Lab Results   Component Value Date/Time    Culture result: (A) 06/16/2018 12:27 AM     GRAM POSITIVE COCCI IN CLUSTERS GROWING IN 1 OF 4 BOTTLES DRAWN SITE = R ARM    Culture result: REMAINING BOTTLE(S) HAS/HAVE NO GROWTH SO FAR 06/16/2018 12:27 AM    Culture result: MRSA NOT PRESENT 05/19/2018 10:09 PM    Culture result:  05/19/2018 10:09 PM         Screening of patient nares for MRSA is for surveillance purposes and, if positive, to facilitate isolation considerations in high risk settings. It is not intended for automatic decolonization interventions per se as regimens are not sufficiently effective to warrant routine use.      Recent Results (from the past 24 hour(s))   GLUCOSE, POC    Collection Time: 06/17/18 11:30 AM   Result Value Ref Range    Glucose (POC) 204 (H) 65 - 100 mg/dL    Performed by [facility], POC    Collection Time: 06/17/18  4:59 PM   Result Value Ref Range    Glucose (POC) 249 (H) 65 - 100 mg/dL    Performed by [address] [place] [address], UR, RANDOM    Collection Time: 06/17/18  6:16 PM   Result Value Ref Range    Creatinine, urine 19.20 mg/dL   SODIUM, UR, RANDOM    Collection Time: 06/17/18  6:16 PM   Result Value Ref Range    Sodium urine, random 75 MMOL/L   UREA NITROGEN, UR, RANDOM    Collection Time: 06/17/18  6:16 PM   Result Value Ref Range    Urea Nitrogen,urine random 324 MG/DL   GLUCOSE, POC    Collection Time: 06/17/18  9:48 PM   Result Value Ref Range    Glucose (POC) 237 (H) 65 - 100 mg/dL    Performed by Yaritza Chamorro METABOLIC PANEL, BASIC    Collection Time: 06/18/18  4:57 AM   Result Value Ref Range    Sodium 142 136 - 145 mmol/L    Potassium 3.9 3.5 - 5.1 mmol/L    Chloride 104 97 - 108 mmol/L    CO2 25 21 - 32 mmol/L    Anion gap 13 5 - 15 mmol/L    Glucose 99 65 - 100 mg/dL    BUN 51 (H) 6 - 20 MG/DL    Creatinine 1.41 (H) 0.55 - 1.02 MG/DL    BUN/Creatinine ratio 36 (H) 12 - 20      GFR est AA 43 (L) >60 ml/min/1.73m2    GFR est non-AA 36 (L) >60 ml/min/1.73m2    Calcium 8.8 8.5 - 10.1 MG/DL   CBC WITH AUTOMATED DIFF    Collection Time: 06/18/18  4:57 AM   Result Value Ref Range    WBC 5.9 3.6 - 11.0 K/uL    RBC 2.85 (L) 3.80 - 5.20 M/uL    HGB 7.8 (L) 11.5 - 16.0 g/dL    HCT 25.1 (L) 35.0 - 47.0 %    MCV 88.1 80.0 - 99.0 FL    MCH 27.4 26.0 - 34.0 PG    MCHC 31.1 30.0 - 36.5 g/dL    RDW 19.1 (H) 11.5 - 14.5 %    PLATELET 416 798 - 892 K/uL    MPV 9.7 8.9 - 12.9 FL    NRBC 0.0 0  WBC    ABSOLUTE NRBC 0.00 0.00 - 0.01 K/uL    NEUTROPHILS 71 32 - 75 %    LYMPHOCYTES 19 12 - 49 %    MONOCYTES 7 5 - 13 %    EOSINOPHILS 2 0 - 7 %    BASOPHILS 0 0 - 1 %    IMMATURE GRANULOCYTES 0 0.0 - 0.5 %    ABS. NEUTROPHILS 4.2 1.8 - 8.0 K/UL    ABS. LYMPHOCYTES 1.1 0.8 - 3.5 K/UL    ABS. MONOCYTES 0.4 0.0 - 1.0 K/UL    ABS. EOSINOPHILS 0.1 0.0 - 0.4 K/UL    ABS. BASOPHILS 0.0 0.0 - 0.1 K/UL    ABS. IMM. GRANS. 0.0 0.00 - 0.04 K/UL    DF AUTOMATED     GLUCOSE, POC    Collection Time: 06/18/18  6:31 AM   Result Value Ref Range    Glucose (POC) 105 (H) 65 - 100 mg/dL    Performed by Elijah Conn MD  [address]  Phone - [phone]   Fax - [phone]  [email or web address]

## 2018-06-18 NOTE — PROGRESS NOTES
PCCM Addendum    CXR form this am with pulmonary edema  Received Lasix 80 mg PO this am and another 40 mg IV ordered. She is on RA currently and in no distress. 4 L net negative fluid balance.   HR 85 NSR and /67    Will hold additional diuretic for now but may give later if resp status does not remain stable    Daphnie Rocha MD

## 2018-06-18 NOTE — PROGRESS NOTES
Problem: Mobility Impaired (Adult and Pediatric)  Goal: *Acute Goals and Plan of Care (Insert Text)  Physical Therapy Goals  Initiated 6/15/2018  1. Patient will move from supine to sit and sit to supine  in bed with independence within 7 day(s). 2.  Patient will transfer from bed to chair and chair to bed with modified independence using the least restrictive device within 7 day(s). 3.  Patient will perform sit to stand with modified independence within 7 day(s). 4.  Patient will ambulate with modified independence for 150 feet with the least restrictive device within 7 day(s). physical Therapy TREATMENT  Patient: Lyndsay Mead (59 y.o. female)  Date: 6/18/2018  Diagnosis: NSTEMI (non-ST elevated myocardial infarction) [facility]) NSTEMI (non-ST elevated myocardial infarction) [facility])       Precautions: Fall  Chart, physical therapy assessment, plan of care and goals were reviewed. ASSESSMENT:  Patient received in bed, c/o hand pain but agreeable to participate with encouragement. She's overall SBA for bed mobility using bedrail to assist with scooting and transitions. Overall mod A for sit to stand this session, likely due to awkwardness of bed. Patient fatigues quickly this session, only tolerates ambulation x 10 feet with RW. Patient remained sitting OOB in chair at end of session. Patient's O2 sats 87% on RA with activity per monitor but 98% with small pulse ox, no REDDY noted. Patient continues to be appropriate for SNF at discharge. Progression toward goals:  []    Improving appropriately and progressing toward goals  [x]    Improving slowly and progressing toward goals  []    Not making progress toward goals and plan of care will be adjusted     PLAN:  Patient continues to benefit from skilled intervention to address the above impairments. Continue treatment per established plan of care.   Discharge Recommendations:  Skilled Nursing Facility  Further Equipment Recommendations for Discharge:  TBD SUBJECTIVE:   Patient stated You are hard.     OBJECTIVE DATA SUMMARY:   Critical Behavior:  Neurologic State: Alert  Orientation Level: Oriented X4  Cognition: Follows commands  Safety/Judgement: Awareness of environment, Fall prevention  Functional Mobility Training:  Bed Mobility:     Supine to Sit: Stand-by assistance; Additional time              Transfers:  Sit to Stand: Moderate assistance  Stand to Sit: Contact guard assistance        Bed to Chair: Minimum assistance                    Balance:  Sitting: Intact; With support  Standing: Impaired  Standing - Static: Good;Constant support  Standing - Dynamic : Fair  Ambulation/Gait Training:  Distance (ft): 10 Feet (ft)  Assistive Device: Walker, rolling  Ambulation - Level of Assistance: Minimal assistance        Gait Abnormalities: Decreased step clearance        Base of Support: Narrowed     Speed/Haylee: Pace decreased (<100 feet/min)  Step Length: Right shortened;Left shortened                    Stairs:              Neuro Re-Education:    Therapeutic Exercises:     Pain:  Pain Scale 1: Numeric (0 - 10)  Pain Intensity 1: 0              Activity Tolerance:   Fatigues quickly  Please refer to the flowsheet for vital signs taken during this treatment.   After treatment:   [x]    Patient left in no apparent distress sitting up in chair  []    Patient left in no apparent distress in bed  [x]    Call bell left within reach  [x]    Nursing notified  [x]    Caregiver present  []    Bed alarm activated    COMMUNICATION/COLLABORATION:   The patients plan of care was discussed with: Registered Nurse    Eleanor Chen, PT   Time Calculation: 20 mins

## 2018-06-18 NOTE — PROGRESS NOTES
Cm received Cm consult indicating that patient can be discharged today to [facility] [phone] for rehab. Patient will need Bipap at the facility. Cm talked with, Timmy Klinefelter, in  admissions and was informed that patient's authorization is still good today. But if Bipap is needed, the facility, will need 24 hours to secure it. Settings of the bipap are needed as well. Patient's son, Héctor Franz [phone]/ or son Calleen Guest [phone] are able to transport if cleared by therapy. Patient is receiving 2L 02 and if she needs this upong discharge-- ambulance will be arranged. CM sent text to Dr Kena Corrigan asking for call to discuss discharge plan. Evelia Albert UPDATE 11:15 am  Patient will need bi-pap at [facility]. Cm received order from Dr Kena Corrigan with settings indicated and he placed the settings in his daily progress note. CM left messages for [address] [phone] and Timmy Klinefelter in admissions requesting a call regarding patient's bi-pap order and duration of the authorization. If the authorization is not good tomorrow, Dr Kena Corrigan, said patient could go today as patient did not use the bi-pap last night. CM will await call from Tanja or In[place] Pate 12:30 pm   Patient does not require bi pap at discharge. per Dr Kena Corrigan. . She is now on RA. When resting. She did require 02 when ambulating with therapy. Therapy feels patient is safe to travel by car to the facility. One of her sons will transport. Patient's aide in the room and updated sons. CM talked with Timmy Klinefelter in admissions at [facility] to update her. Patient can be admitted tomorrow. Admissions will call insurance company to have the date of admission changed on the authorization. Nurse to call report to [phone].   Medicals needed by facility can be secured from [facility]

## 2018-06-18 NOTE — PROGRESS NOTES
-Hematology / Oncology ([facility]) -  -Primary Oncologist- Néstor Seen  -CC- Breast CA    -S- feels comfortable    -O-      Patient Vitals for the past 24 hrs:   Temp Pulse Resp BP SpO2   06/18/18 0700 - 81 19 107/47 97 %   06/18/18 0600 - 77 18 95/48 100 %   06/18/18 0500 - 88 20 115/63 99 %   06/18/18 0400 98.3 °F (36.8 °C) 85 17 116/49 100 %   06/18/18 0300 - 94 18 126/70 98 %   06/18/18 0200 - 89 18 121/52 98 %   06/18/18 0100 - (!) 113 23 132/86 94 %   06/18/18 0000 98.2 °F (36.8 °C) (!) 112 23 132/63 91 %   06/17/18 2300 - (!) 108 24 123/59 91 %   06/17/18 2200 - 96 24 119/59 95 %   06/17/18 2100 - 87 21 98/51 100 %   06/17/18 2000 98.3 °F (36.8 °C) 73 18 100/42 99 %   06/17/18 1900 - 77 21 112/43 99 %   06/17/18 1800 - 75 17 118/45 99 %   06/17/18 1700 - 81 23 121/48 100 %   06/17/18 1600 98.3 °F (36.8 °C) 78 20 105/43 98 %   06/17/18 1500 - 78 20 117/46 99 %   06/17/18 1400 - 76 19 110/42 100 %   06/17/18 1300 - 75 17 103/41 98 %   06/17/18 1200 98.3 °F (36.8 °C) 76 18 107/55 100 %   06/17/18 1100 - 78 18 112/48 99 %   06/17/18 1000 - 72 17 (!) 99/39 100 %   06/17/18 0900 - 80 19 109/56 100 %   06/17/18 0800 98 °F (36.7 °C) 75 19 101/46 98 %        Gen: nad  Chest: bilateral breath sounds   Cardiac: rrr  Abd: s/nt  Neuro: AAOx 3 non focal    -Labs-    Recent Labs      06/18/18   0457  06/17/18   0557  06/16/18   0200  06/16/18   0029   WBC  5.9  7.0   --   9.2   HGB  7.8*  8.3*   --   10.2*   PLT  234  271   --   358   ANEU  4.2  5.3   --   8.1*   NA  142  137   --   134*   K  3.9  4.2   --   4.8   GLU  99  91   --   402*   BUN  51*  62*   --   74*   CREA  1.41*  1.50*   --   1.92*   ALT   --    --    --   24   SGOT   --    --    --   18   TBILI   --    --    --   0.4   AP   --    --    --   670*   CA  8.8  8.7   --   8.7   MG   --    --   2.0   --          -Assessment + Plan-     *)Met breast ca- ER+ - Known to Dr. Palm Seen.  Prev h/o colon ca but new breast cancer diagnosed  -Continue arimidex daily  -Cont Ibrance 125 mg po once daily - Initiated c1d1 6/11/2015   -will need CBC monitoring following discharge    -Expect neutropenia  - Has JEROD but crcl >15 so continue current dose     *)CHF/CAD -Per Dr Nathan Pierce and Cardiology.     *)Dispo - likely will need SNF    *)Anemia --progressive/recurrent-- last PRBC 4/2018 -will get labs/monitor stools for blood-- may need transfusion

## 2018-06-18 NOTE — PROGRESS NOTES
Hospitalist Progress Note  Nitza Barrera MD  Answering service: [phone] from in house phone         Date of Service:  2018  NAME:  Idalia Cabrera  :  1935  MRN:  [id number]      Admission Summary:   CHIEF COMPLAINT:  Shortness of breath.       HISTORY OF PRESENT ILLNESS:  This is an 82-year-old woman with a past medical history significant for coronary artery disease, chronic systolic congestive heart failure, metastatic left breast cancer, hypertension, type 2 diabetes, hypothyroidism, dyslipidemia, rheumatoid arthritis, chronic kidney disease who was in her usual state of health until the day of her presentation at the emergency room when the patient developed shortness of breath at the rehab facility where the patient is receiving rehabilitation. The patient has had 4 myocardial infarctions since April of this year, each episode presenting with shortness of breath. The patient's son was present at [facility] where the patient is receiving rehabilitation when she developed shortness of breath. She received 3 doses of nitroglycerin, and he advised the facility to send the patient to the emergency room for further evaluation. It was reported that the patient's oxygen saturation was 87%. The patient has had multiple admissions to this hospital for evaluation and treatment of a non-ST elevation myocardial infarction. The last admission to this hospital was from 2018 to 2018. The patient was admitted and treated for non-ST elevation myocardial cardiac infarction. Since April, the patient has been having recurrent myocardial infarctions. She has had a cardiac catheterization that shows a significant lesion, but patient is not a candidate for surgical intervention or PCI/stent placement. When the patient arrived at the emergency room, she was found to have an elevated troponin level.   The patient was referred to the hospitalist service for evaluation for admission. No history of fever, no rigors and no chills. Interval history / Subjective:       6/13:  Usual breast cancer related lt lat chest pain, pt usually up walking at rehab per her report, will obtain 6 min walk 02 test.     1/14:  Slight bump in Cr. Pt is likely to go hospice soon  dispo to accepting snf/rehab today , can f/u on Cr and dose lasix accordingly on discharge with close f/u lab. If here in am (likely) will repeat the Cr /K ,/Na ( now 133) and go from there, agree no lasix today. 6/15:  Gouty presentation rt wrist resolved, pred 20 for now daily,    Cr up a bit,  Bolus 250 ns and hold diuretic, and f/u alb  Case advised best dc date 6/16 + and pt needs to participate with PT ,     6/16: Moved to icu last pm 2n2 flash pul edema resolved quickly on and off bipap  Renal numbers a bit up to day, no routine lasix ordered, lung are clear, received 60 mg total lasix last pm 2300     6/17:  Stable exam, for down grade to MICU,  Lasix at 80 mg daily started by card. 6/18:  Case discussed with pul med, even though pt did not require BiPAP last two nights, it is recommended,   Pt may to SNF today and start hs bipap tomorrow. Discussed with CASE. Assessment & Plan:     NSTMI. Stable,  Elevated troponin/CAD: severe multivessel disease  -Troponin 0.16 (less than last admission- was 1.44)  -12 lead EKG:  ST, lateral ST/T wave abnormality, not significantly changed from 6/3  -Chest pain resolved   -Not candidate for CABG or intervention.  -Continue ASA, plavix, BB, low dose ace-Iand repatha (if family able to bring in). No statin due to intolerance in past.  -Will add Imdur 30 mg daily   -on coreg  But   Lisinopril held 2n2 to renal issues  Same 6/17/2018  A  Lab Results   Component Value Date/Time    Creatinine 1.41 (H) 06/18/2018 04:57 AM   -Hold hold ACE/ARB's       Resp failure, acute ;   For recommended  BIPAP q hs   settings I/e: 12/6  FI02: 80 % and rate 4  Acute on chronic syst HF  NYHA IV EF 30 % NWMA,( reduced form 40 % on [facility] 5/21)   Left ventricle: Systolic function was moderately to markedly reduced. Ejection fraction was estimated to be 30 %. No obvious wall motion  abnormalities identified in the views obtained. -Moved to icu l6/15 pm  2n2 flash pul edema resolved quickly on and off bipap  Renal numbers a bit up to day, no routine lasix ordered, lung are clear, received 60 mg total lasix last pm 2300 , now on lasix 80 mg dialy as of 6/17/2018   . cont to hold arb/ace 2n2 renal issues ,cont on BB however. Probable gout rt wrist/hand  prednison 40 mg x 1 , monitor. F/u on taper dose prednisone on discharge. ( one dose naprosyn 6/14/2018  , will add prn norco as well on discharge) f/u with rhematology as needed as an out pt. For pred 20 mg daily for now, and taper soon. ? On discharge. Off pred, inflammation resolved. 6/18/2018 having pain in the 2 mcp joint rt hand ; will give prednisone 20 mg daily x 5 days. CKD 3  Lab Results   Component Value Date/Time    Creatinine 1.41 (H) 06/18/2018 04:57 AM   holding diuretic and f/u lab and giving 250 cc bolus but over week end started back on 80mg lasix daily and renal mumbers remain stable,       HTN, on coreg  But   Lisinopril held 2n2 to renal issues. BP Readings from Last 1 Encounters:   06/18/18 123/51      Hyperglycemia , worst with pred for gout,  SSI, and coverage as needed  Lab Results   Component Value Date/Time    Glucose 99 06/18/2018 04:57 AM    Glucose (POC) 105 (H) 06/18/2018 06:31 AM    better as steroids removed and on Lantus with dose to be reduced.      DM on SSI> lantus added 6/18/2018     Anemia, chronic   Lab Results   Component Value Date/Time    HGB 7.8 (L) 06/18/2018 04:57 AM        Metastatic breast cancer, crurent and colon cancer followed by hem/onc    Code status: DNR  DVT prophylaxis: heparin     Care Plan discussed with: Patient/Family and Nurse  Disposition: SNF/LTC and TBD discussing with cm and son( to call after pt/ot eval )     Hospital Problems  Date Reviewed: 6/12/2018          Codes Class Noted POA    * (Principal)NSTEMI (non-ST elevated myocardial infarction) [facility]) ICD-10-CM: I21.4  ICD-9-CM: 410.70  6/1/2018 Yes                Review of Systems:   No cp no sob no n/v/d/f, 2/3 mcp rt hand acute joints c/c gout. Vital Signs:    Last 24hrs VS reviewed since prior progress note. Most recent are:  Visit Vitals    /51 (BP 1 Location: Right arm, BP Patient Position: At rest)    Pulse 81    Temp 97.8 °F (36.6 °C)    Resp 20    Ht 5' 1\" (1.549 m)    Wt 55.5 kg (122 lb 5.7 oz)    SpO2 98%    BMI 23.12 kg/m2         Intake/Output Summary (Last 24 hours) at 06/18/18 1058  Last data filed at 06/18/18 1000   Gross per 24 hour   Intake              300 ml   Output             1230 ml   Net             -930 ml        Physical Examination:             Constitutional:  No acute distress, cooperative, pleasant    ENT:  Oral mucous moist, oropharynx benign. Neck supple,    Resp:  CTA bilaterally. No wheezing/rhonchi/rales. No accessory muscle use   CV:  Regular rhythm, normal rate, no definite  gallops, rubs    GI:  Soft, non distended, non tender. normoactive bowel sounds, no hepatosplenomegaly     Musculoskeletal:  No edema, warm, 1+ pulses throughout swollen painful 2/3 rt mcp joints    Neurologic:  Moves all extremities. AAOx3, CN II-XII reviewed     Psych:  Good insight, Not anxious nor agitated.   Skin:  Good turgor, no rashes or ulcers       Data Review:    Review and/or order of clinical lab test      Labs:     Recent Labs      06/18/18   0457  06/17/18   0557   WBC  5.9  7.0   HGB  7.8*  8.3*   HCT  25.1*  26.6*   PLT  234  271     Recent Labs      06/18/18   0457  06/17/18   0557  06/16/18   0200  06/16/18   0029   NA  142  137   --   134*   K  3.9  4.2   --   4.8   CL  104  104   --   97   CO2  25  24   --   25   BUN  51*  62* --   74*   CREA  1.41*  1.50*   --   1.92*   GLU  99  91   --   402*   CA  8.8  8.7   --   8.7   MG   --    --   2.0   --      Recent Labs      06/16/18   0029   SGOT  18   ALT  24   AP  670*   TBILI  0.4   TP  8.8*   ALB  2.7*   GLOB  6.1*     No results for input(s): INR, PTP, APTT in the last 72 hours. No lab exists for component: INREXT, INREXT   No results for input(s): FE, TIBC, PSAT, FERR in the last 72 hours. No results found for: FOL, RBCF   No results for input(s): PH, PCO2, PO2 in the last 72 hours.   Recent Labs      06/15/18   2055   TROIQ  <0.05     Lab Results   Component Value Date/Time    Cholesterol, total 74 04/16/2018 04:21 AM    HDL Cholesterol 25 04/16/2018 04:21 AM    LDL, calculated 31.6 04/16/2018 04:21 AM    Triglyceride 87 04/16/2018 04:21 AM    CHOL/HDL Ratio 3.0 04/16/2018 04:21 AM     Lab Results   Component Value Date/Time    Glucose (POC) 105 (H) 06/18/2018 06:31 AM    Glucose (POC) 237 (H) 06/17/2018 09:48 PM    Glucose (POC) 249 (H) 06/17/2018 04:59 PM    Glucose (POC) 204 (H) 06/17/2018 11:30 AM    Glucose (POC) 121 (H) 06/17/2018 06:58 AM     Lab Results   Component Value Date/Time    Color YELLOW/STRAW 06/16/2018 12:29 AM    Appearance CLOUDY (A) 06/16/2018 12:29 AM    Specific gravity 1.010 06/16/2018 12:29 AM    pH (UA) 6.0 06/16/2018 12:29 AM    Protein TRACE (A) 06/16/2018 12:29 AM    Glucose 100 (A) 06/16/2018 12:29 AM    Ketone NEGATIVE  06/16/2018 12:29 AM    Bilirubin NEGATIVE  06/16/2018 12:29 AM    Urobilinogen 0.2 06/16/2018 12:29 AM    Nitrites NEGATIVE  06/16/2018 12:29 AM    Leukocyte Esterase LARGE (A) 06/16/2018 12:29 AM    Epithelial cells FEW 06/16/2018 12:29 AM    Bacteria 1+ (A) 06/16/2018 12:29 AM    WBC 20-50 06/16/2018 12:29 AM    RBC 5-10 06/16/2018 12:29 AM         Medications Reviewed:     Current Facility-Administered Medications   Medication Dose Route Frequency    insulin glargine (LANTUS) injection 10 Units  10 Units SubCUTAneous QHS    heparin (porcine) injection 5,000 Units  5,000 Units SubCUTAneous Q12H    benzocaine-menthol (CEPACOL) lozenge 1 Lozenge  1 Lozenge Mucous Membrane PRN    simethicone (MYLICON) tablet 80 mg  80 mg Oral PRN    sodium chloride (NS) flush 5-10 mL  5-10 mL IntraVENous PRN    piperacillin-tazobactam (ZOSYN) 4.5 g in 0.9% sodium chloride (MBP/ADV) 100 mL  4.5 g IntraVENous Q12H    levoFLOXacin (LEVAQUIN) 500 mg in D5W IVPB  500 mg IntraVENous Q48H    furosemide (LASIX) tablet 80 mg  80 mg Oral DAILY    methyl salicylate-menthol (BENGAY) 15-10 % cream   Topical PRN    carvedilol (COREG) tablet 6.25 mg  6.25 mg Oral BID WITH MEALS    polyethylene glycol (MIRALAX) packet 17 g  17 g Oral DAILY    insulin lispro (HUMALOG) injection   SubCUTAneous AC&HS    glucose chewable tablet 16 g  4 Tab Oral PRN    dextrose (D50W) injection syrg 12.5-25 g  12.5-25 g IntraVENous PRN    glucagon (GLUCAGEN) injection 1 mg  1 mg IntraMUSCular PRN    anastrozole (ARIMIDEX) tablet 1 mg  1 mg Oral DAILY    ascorbic acid (vitamin C) (VITAMIN C) tablet 1,000 mg  1,000 mg Oral DAILY    aspirin delayed-release tablet 81 mg  81 mg Oral DAILY    cholecalciferol (VITAMIN D3) tablet 1,000 Units  1,000 Units Oral DAILY    clopidogrel (PLAVIX) tablet 75 mg  75 mg Oral DAILY    docusate sodium (COLACE) capsule 100 mg  100 mg Oral BID    epoetin celio (EPOGEN;PROCRIT) injection 10,000 Units  10,000 Units SubCUTAneous Q MON, WED & FRI    folic acid (FOLVITE) tablet 1 mg  1 mg Oral DAILY    lactobac ac& pc-s.therm-b.anim (BERYL Q/RISAQUAD)  1 Cap Oral DAILY    levothyroxine (SYNTHROID) tablet 88 mcg  88 mcg Oral ACB    nitroglycerin (NITROSTAT) tablet 0.4 mg  0.4 mg SubLINGual PRN    sodium chloride (NS) flush 5-10 mL  5-10 mL IntraVENous Q8H    sodium chloride (NS) flush 5-10 mL  5-10 mL IntraVENous PRN    acetaminophen (TYLENOL) tablet 650 mg  650 mg Oral Q4H PRN    ondansetron (ZOFRAN) injection 4 mg  4 mg IntraVENous Q4H PRN    evolocumab (REPATHA) syringe 140 mg (Patient Supplied)  140 mg SubCUTAneous Q 14 DAYS    teriparatide (FORTEO) injection 20 mcg (Patient Supplied)  20 mcg SubCUTAneous DAILY    palbociclib cap 125 mg (Patient Supplied)  125 mg Oral DAILY WITH LUNCH    b-complex with vitamin c tablet 1 Tab (Patient Supplied)  1 Tab Oral DAILY     ______________________________________________________________________  EXPECTED LENGTH OF STAY: 4d 7h  ACTUAL LENGTH OF STAY:          Eugene Silva MD

## 2018-06-18 NOTE — PROGRESS NOTES
Problem: Self Care Deficits Care Plan (Adult)  Goal: *Acute Goals and Plan of Care (Insert Text)  Occupational Therapy Goals  Initiated 6/13/2018  1. Patient will perform grooming at the sink with supervision/set-up within 7 day(s). 2.  Patient will perform lower body dressing with supervision within 7 day(s). 3.  Patient will perform bathing with supervision/set-up within 7 day(s). 4.  Patient will perform toilet transfers with supervision/set-up and least restrictive DME within 7 day(s). 5.  Patient will perform all aspects of toileting with supervision/set-up within 7 day(s). 6.  Patient will participate in upper extremity therapeutic exercise/activities with supervision/set-up for 10 minutes within 7 day(s). 7.  Patient will utilize energy conservation techniques during functional activities with verbal cues within 7 day(s). Occupational Therapy TREATMENT  Patient: Florian West (42 y.o. female)  Date: 6/18/2018  Diagnosis: NSTEMI (non-ST elevated myocardial infarction) [facility]) NSTEMI (non-ST elevated myocardial infarction) [facility])       Precautions: Fall  Chart, occupational therapy assessment, plan of care, and goals were reviewed. ASSESSMENT:  Pt making steady progress with acute therapy. Pt received resting in bed with private home aide at bedside. Pt alert, oriented x4. Pt with continue c/o R hand pain (LEE gloves on). With encouragement, pt agreeable to therapy session. ADLs continue to be limited by decreased functional strength, cardiopulmonary tolerance, AROM, R hand pain, mild REDDY, impaired standing balance/tolerance, learned helplessness. Pt received on RA with sats 91-94% at rest, following activity, sats 88% per monitor (probe on toes), however, with portable pulse ox, sats 98% on RA; pt with no c/o REDDY, however, general fatigue.  Pt participated in bed mobility, LB dressing, functional transfers and short functional mobility with RW in prep for ADLs with overall supervision/set-up to [place]. At baseline, pt has home aides from 8-3 and 3-8 5x/week. Pt would benefit from short rehab stay (skilled nursing) versus home health OT/PT with 24/7 supervision/assistance if able to be provided. Progression toward goals:  []       Improving appropriately and progressing toward goals  [x]       Improving slowly and progressing toward goals  []       Not making progress toward goals and plan of care will be adjusted     PLAN:  Patient continues to benefit from skilled intervention to address the above impairments. Continue treatment per established plan of care. Discharge Recommendations:  [facility] versus home health with 24/7 supervision/assistance and To Be Determined  Further Equipment Recommendations for Discharge:  TBD, none at this time     SUBJECTIVE:   Patient stated You are tough.  Educated pt on actively participating in ADLs to increase functional strength and activity tolerance. OBJECTIVE DATA SUMMARY:   Cognitive/Behavioral Status:  Neurologic State: Alert; Appropriate for age  Orientation Level: Oriented X4  Cognition: Follows commands; Appropriate safety awareness; Appropriate for age attention/concentration; Appropriate decision making  Perception: Appears intact  Perseveration: No perseveration noted  Safety/Judgement: Awareness of environment; Fall prevention;Good awareness of safety precautions; Home safety; Insight into deficits    Functional Mobility and Transfers for ADLs:  Bed Mobility:  Supine to Sit: Stand-by assistance; Additional time; Adaptive equipment  Scooting: Minimum assistance; Adaptive equipment; Additional time;Assist x1    Transfers:  Sit to Stand: Moderate assistance;Assist x1;Additional time  Functional Transfers  Toilet Transfer : Minimum assistance (inferred from functional transfers)  Adaptive Equipment: Kati Loera (comment); Bedside commode  Bed to Chair: Minimum assistance; Adaptive equipment; Additional time;Assist x1    Balance:  Sitting: Intact; With support  Standing: Impaired; With support  Standing - Static: Good;Constant support  Standing - Dynamic : Fair    ADL Intervention:  Feeding  Feeding Assistance: Supervision/set-up                        Lower Body Dressing Assistance  Slip on Shoes with Back: Minimum assistance (A for heel in shoe; learned helplessness)  Position Performed: Seated edge of bed         Cognitive Retraining  Safety/Judgement: Awareness of environment; Fall prevention;Good awareness of safety precautions; Home safety; Insight into deficits       Activity Tolerance:   Fair tolerance. O2 on RA pre activity 91-94%, post-activity 88% per monitor, 98% per portable pulse ox, HR 88. Please refer to the flowsheet for vital signs taken during this treatment.   After treatment:   [x] Patient left in no apparent distress sitting up in chair  [] Patient left in no apparent distress in bed  [x] Call bell left within reach  [x] Nursing notified  [x] Caregiver present  [] Bed alarm activated    COMMUNICATION/COLLABORATION:   The patients plan of care was discussed with: Physical Therapist, Registered Nurse and     Amaya Hunter OT  Time Calculation: 19 mins

## 2018-06-18 NOTE — PROGRESS NOTES
Cardiology Progress Note            Admit Date: 6/12/2018  Admit Diagnosis: NSTEMI (non-ST elevated myocardial infarction) [facility])  Date: 6/18/2018     Time: 8:50 AM    HPI: Pt with severe multivessel CAD, multiple NSTEMI,  metastatic breast cancer, ckd. Presents 6/11/18 from [address] [place] [address] with chest pressure and SOB. Mild elevation in troponin- peak 0.16. Subjective:  C/o mild SOB but states bipap overnight helped. Denies chest pain currently but states she sometimes has brief episodes of chest pain, none overnight. Assessment and Plan     1. Elevated troponin/CAD: severe multivessel disease. Not candidate for CABG or intervention.  -Troponin peak this admission 0.16 (less than last admission- was 1.44)  -Troponin elevation likely represents demand from fluid overload, not NSTEMI  -12 lead EKG:  ST, lateral ST/T wave abnormality, not significantly changed from 6/3  -Chest pain resolved currently   -Continue ASA, plavix, BB, and repatha. No statin due to intolerance in past.    2. Acute on chronic systolic CHF: NYHA class IV on admission, class III-IV today.  -6/1/18 echoEF 30%. NWMA.    -Aldactone stopped last admission due to hyperkalemia   -Daily I/Os and weights- net -860 mls/24 hours    -Lasix 80 mg po daily-  -Continue BB. No ace-I due to renal function      3. CKD: Stage III. Creatinine trending down 1.43 (baseline=1.2)  -Hold Ace-I  -Nephrology following  -Will need to tolerate a little higher than baseline creatinine to diurese.      4. Hx of HTN:  BP controlled  -Holding lisinopril due to renal function  -Continue Coreg     5. Anemia: hgb 7.8     7. Hx of metastatic breast cancer (current) and colon ca. -Per heme/onc    8. Advanced directive: DNR. ?needs DDNR. Rehab at SNF planned at discharge. Wheezing this a.m. ? anemia also contributing to SOB. Will check chest xray.   Will need to accept slightly higher creatinine for adequate diuresis. Check NT probnp tomorrow. Cardiology Attending:Patient seen and examined. I agree with NP assessment and plans. CXR looks worse may benefit from additional diuretic. Jn López MD 6/18/2018 11:42 AM       Jane Montana NP 6/18/2018 7:30 AM         Objective:      Physical Exam:                Visit Vitals    BP 95/48    Pulse 77    Temp 98.3 °F (36.8 °C)    Resp 18    Ht 5' 1\" (1.549 m)    Wt 55.5 kg (122 lb 5.7 oz)    SpO2 100%    BMI 23.12 kg/m2          General Appearance:   Alert and oriented x 3, and   individual in no acute distress. Ears/Nose/Mouth/Throat:    Hearing grossly normal.         Neck:  Supple. Chest:    Wheezes bilaterally. No use of accessory muscles. Cardiovascular:   Regular rate and rhythm, S1, S2 normal, no murmur noted. Abdomen:    soft, nontender   Extremities:  No edema bilaterally. Skin:  Warm and dry.        Telemetry: normal sinus rhythm          Data Review:    Labs:    Recent Results (from the past 24 hour(s))   GLUCOSE, POC    Collection Time: 06/17/18 11:30 AM   Result Value Ref Range    Glucose (POC) 204 (H) 65 - 100 mg/dL    Performed by [facility], POC    Collection Time: 06/17/18  4:59 PM   Result Value Ref Range    Glucose (POC) 249 (H) 65 - 100 mg/dL    Performed by [address] [place] [address], UR, RANDOM    Collection Time: 06/17/18  6:16 PM   Result Value Ref Range    Creatinine, urine 19.20 mg/dL   SODIUM, UR, RANDOM    Collection Time: 06/17/18  6:16 PM   Result Value Ref Range    Sodium urine, random 75 MMOL/L   UREA NITROGEN, UR, RANDOM    Collection Time: 06/17/18  6:16 PM   Result Value Ref Range    Urea Nitrogen,urine random 324 MG/DL   GLUCOSE, POC    Collection Time: 06/17/18  9:48 PM   Result Value Ref Range    Glucose (POC) 237 (H) 65 - 100 mg/dL    Performed by Toney Green BASIC    Collection Time: 06/18/18  4:57 AM   Result Value Ref Range    Sodium 142 136 - 145 mmol/L Potassium 3.9 3.5 - 5.1 mmol/L    Chloride 104 97 - 108 mmol/L    CO2 25 21 - 32 mmol/L    Anion gap 13 5 - 15 mmol/L    Glucose 99 65 - 100 mg/dL    BUN 51 (H) 6 - 20 MG/DL    Creatinine 1.41 (H) 0.55 - 1.02 MG/DL    BUN/Creatinine ratio 36 (H) 12 - 20      GFR est AA 43 (L) >60 ml/min/1.73m2    GFR est non-AA 36 (L) >60 ml/min/1.73m2    Calcium 8.8 8.5 - 10.1 MG/DL   CBC WITH AUTOMATED DIFF    Collection Time: 06/18/18  4:57 AM   Result Value Ref Range    WBC 5.9 3.6 - 11.0 K/uL    RBC 2.85 (L) 3.80 - 5.20 M/uL    HGB 7.8 (L) 11.5 - 16.0 g/dL    HCT 25.1 (L) 35.0 - 47.0 %    MCV 88.1 80.0 - 99.0 FL    MCH 27.4 26.0 - 34.0 PG    MCHC 31.1 30.0 - 36.5 g/dL    RDW 19.1 (H) 11.5 - 14.5 %    PLATELET 161 519 - 724 K/uL    MPV 9.7 8.9 - 12.9 FL    NRBC 0.0 0  WBC    ABSOLUTE NRBC 0.00 0.00 - 0.01 K/uL    NEUTROPHILS 71 32 - 75 %    LYMPHOCYTES 19 12 - 49 %    MONOCYTES 7 5 - 13 %    EOSINOPHILS 2 0 - 7 %    BASOPHILS 0 0 - 1 %    IMMATURE GRANULOCYTES 0 0.0 - 0.5 %    ABS. NEUTROPHILS 4.2 1.8 - 8.0 K/UL    ABS. LYMPHOCYTES 1.1 0.8 - 3.5 K/UL    ABS. MONOCYTES 0.4 0.0 - 1.0 K/UL    ABS. EOSINOPHILS 0.1 0.0 - 0.4 K/UL    ABS. BASOPHILS 0.0 0.0 - 0.1 K/UL    ABS. IMM.  GRANS. 0.0 0.00 - 0.04 K/UL    DF AUTOMATED     GLUCOSE, POC    Collection Time: 06/18/18  6:31 AM   Result Value Ref Range    Glucose (POC) 105 (H) 65 - 100 mg/dL    Performed by Myrtle Monroe           Radiology:        Current Facility-Administered Medications   Medication Dose Route Frequency    insulin glargine (LANTUS) injection 10 Units  10 Units SubCUTAneous QHS    heparin (porcine) injection 5,000 Units  5,000 Units SubCUTAneous Q12H    benzocaine-menthol (CEPACOL) lozenge 1 Lozenge  1 Lozenge Mucous Membrane PRN    simethicone (MYLICON) tablet 80 mg  80 mg Oral PRN    sodium chloride (NS) flush 5-10 mL  5-10 mL IntraVENous PRN    piperacillin-tazobactam (ZOSYN) 4.5 g in 0.9% sodium chloride (MBP/ADV) 100 mL  4.5 g IntraVENous Q12H  levoFLOXacin (LEVAQUIN) 500 mg in D5W IVPB  500 mg IntraVENous Q48H    furosemide (LASIX) tablet 80 mg  80 mg Oral DAILY    methyl salicylate-menthol (BENGAY) 15-10 % cream   Topical PRN    carvedilol (COREG) tablet 6.25 mg  6.25 mg Oral BID WITH MEALS    polyethylene glycol (MIRALAX) packet 17 g  17 g Oral DAILY    insulin lispro (HUMALOG) injection   SubCUTAneous AC&HS    glucose chewable tablet 16 g  4 Tab Oral PRN    dextrose (D50W) injection syrg 12.5-25 g  12.5-25 g IntraVENous PRN    glucagon (GLUCAGEN) injection 1 mg  1 mg IntraMUSCular PRN    anastrozole (ARIMIDEX) tablet 1 mg  1 mg Oral DAILY    ascorbic acid (vitamin C) (VITAMIN C) tablet 1,000 mg  1,000 mg Oral DAILY    aspirin delayed-release tablet 81 mg  81 mg Oral DAILY    cholecalciferol (VITAMIN D3) tablet 1,000 Units  1,000 Units Oral DAILY    clopidogrel (PLAVIX) tablet 75 mg  75 mg Oral DAILY    docusate sodium (COLACE) capsule 100 mg  100 mg Oral BID    epoetin celio (EPOGEN;PROCRIT) injection 10,000 Units  10,000 Units SubCUTAneous Q MON, WED & FRI    folic acid (FOLVITE) tablet 1 mg  1 mg Oral DAILY    lactobac ac& pc-s.therm-b.anim (BERYL Q/RISAQUAD)  1 Cap Oral DAILY    levothyroxine (SYNTHROID) tablet 88 mcg  88 mcg Oral ACB    nitroglycerin (NITROSTAT) tablet 0.4 mg  0.4 mg SubLINGual PRN    sodium chloride (NS) flush 5-10 mL  5-10 mL IntraVENous Q8H    sodium chloride (NS) flush 5-10 mL  5-10 mL IntraVENous PRN    acetaminophen (TYLENOL) tablet 650 mg  650 mg Oral Q4H PRN    ondansetron (ZOFRAN) injection 4 mg  4 mg IntraVENous Q4H PRN    evolocumab (REPATHA) syringe 140 mg (Patient Supplied)  140 mg SubCUTAneous Q 14 DAYS    teriparatide (FORTEO) injection 20 mcg (Patient Supplied)  20 mcg SubCUTAneous DAILY    palbociclib cap 125 mg (Patient Supplied)  125 mg Oral DAILY WITH LUNCH    b-complex with vitamin c tablet 1 Tab (Patient Supplied)  1 Tab Oral DAILY       GABRIELLE Logan NP Georgiann Pang Rigo Lewis MD     [facility] [address] [place] [address] 13, [address] [facility] [address]   Radha Montana   [phone]

## 2018-06-18 NOTE — ROUTINE PROCESS
1930:  Bedside and Verbal shift change report given to [address] [place] [address] (oncoming nurse) by Saranya Espinoza RN (offgoing nurse). Report included the following information SBAR, Kardex, Procedure Summary, Intake/Output, MAR, Accordion, Recent Results, Med Rec Status and Cardiac Rhythm sinus rhythm. 0730: Bedside and Verbal shift change report given to [facility] (oncoming nurse) by Tramaine Ramirez RN (offgoing nurse). Report included the following information SBAR, Kardex, ED Summary, OR Summary, Procedure Summary, Intake/Output, MAR, Accordion, Recent Results, Med Rec Status and Cardiac Rhythm sinus rhythm.

## 2018-06-18 NOTE — PROGRESS NOTES
2005:  Patient complaining about sore throat. Requesting lozenges. MD on call paged. 2210:  Patient called out complaining of being wet. Alford catheter in place, but moderate amount of wetness noted on bed sheets. Patient cleaned and repositioned. 2324:  Patient requesting gas medicine. MD on call paged.

## 2018-06-18 NOTE — PROGRESS NOTES
PCCM    On bipap qhs  On NC O2 2 L/min  No vasoactive drips  Renal indices improving   On medical management for CHF (systolic) and CAD   On treatment for metastatic ER+ breast cancer with Arimidex / Tabitha Pump with oncology following (WBC 5.9 / Hgb 7.8, plt 234)  DNR code status  Has IMCU orders    Will be available to see again if needed    Roz Field MD

## 2018-06-19 LAB
ALBUMIN SERPL-MCNC: 2.6 G/DL (ref 3.5–5)
ANION GAP SERPL CALC-SCNC: 10 MMOL/L (ref 5–15)
BUN SERPL-MCNC: 60 MG/DL (ref 6–20)
BUN/CREAT SERPL: 36 (ref 12–20)
CALCIUM SERPL-MCNC: 8.4 MG/DL (ref 8.5–10.1)
CHLORIDE SERPL-SCNC: 98 MMOL/L (ref 97–108)
CO2 SERPL-SCNC: 27 MMOL/L (ref 21–32)
CREAT SERPL-MCNC: 1.67 MG/DL (ref 0.55–1.02)
GLUCOSE BLD STRIP.AUTO-MCNC: 164 MG/DL (ref 65–100)
GLUCOSE BLD STRIP.AUTO-MCNC: 290 MG/DL (ref 65–100)
GLUCOSE BLD STRIP.AUTO-MCNC: 364 MG/DL (ref 65–100)
GLUCOSE BLD STRIP.AUTO-MCNC: 398 MG/DL (ref 65–100)
GLUCOSE SERPL-MCNC: 260 MG/DL (ref 65–100)
PHOSPHATE SERPL-MCNC: 3.7 MG/DL (ref 2.6–4.7)
POTASSIUM SERPL-SCNC: 3.8 MMOL/L (ref 3.5–5.1)
SERVICE CMNT-IMP: ABNORMAL
SODIUM SERPL-SCNC: 135 MMOL/L (ref 136–145)

## 2018-06-19 PROCEDURE — 77010033678 HC OXYGEN DAILY

## 2018-06-19 PROCEDURE — 86900 BLOOD TYPING SEROLOGIC ABO: CPT | Performed by: INTERNAL MEDICINE

## 2018-06-19 PROCEDURE — 74011250637 HC RX REV CODE- 250/637: Performed by: INTERNAL MEDICINE

## 2018-06-19 PROCEDURE — 77030011256 HC DRSG MEPILEX <16IN NO BORD MOLN -A

## 2018-06-19 PROCEDURE — 74011250637 HC RX REV CODE- 250/637: Performed by: NURSE PRACTITIONER

## 2018-06-19 PROCEDURE — 82962 GLUCOSE BLOOD TEST: CPT

## 2018-06-19 PROCEDURE — 36430 TRANSFUSION BLD/BLD COMPNT: CPT | Performed by: NURSE PRACTITIONER

## 2018-06-19 PROCEDURE — 74011000258 HC RX REV CODE- 258: Performed by: INTERNAL MEDICINE

## 2018-06-19 PROCEDURE — 74011636637 HC RX REV CODE- 636/637: Performed by: INTERNAL MEDICINE

## 2018-06-19 PROCEDURE — 74011250637 HC RX REV CODE- 250/637: Performed by: FAMILY MEDICINE

## 2018-06-19 PROCEDURE — 86923 COMPATIBILITY TEST ELECTRIC: CPT | Performed by: INTERNAL MEDICINE

## 2018-06-19 PROCEDURE — 30233N1 TRANSFUSION OF NONAUTOLOGOUS RED BLOOD CELLS INTO PERIPHERAL VEIN, PERCUTANEOUS APPROACH: ICD-10-PCS | Performed by: INTERNAL MEDICINE

## 2018-06-19 PROCEDURE — 74011250636 HC RX REV CODE- 250/636: Performed by: SPECIALIST

## 2018-06-19 PROCEDURE — 74011250636 HC RX REV CODE- 250/636: Performed by: FAMILY MEDICINE

## 2018-06-19 PROCEDURE — 74011000258 HC RX REV CODE- 258: Performed by: FAMILY MEDICINE

## 2018-06-19 PROCEDURE — 80069 RENAL FUNCTION PANEL: CPT | Performed by: INTERNAL MEDICINE

## 2018-06-19 PROCEDURE — 65660000000 HC RM CCU STEPDOWN

## 2018-06-19 PROCEDURE — 74011250637 HC RX REV CODE- 250/637: Performed by: SPECIALIST

## 2018-06-19 PROCEDURE — P9016 RBC LEUKOCYTES REDUCED: HCPCS | Performed by: INTERNAL MEDICINE

## 2018-06-19 PROCEDURE — 36415 COLL VENOUS BLD VENIPUNCTURE: CPT | Performed by: INTERNAL MEDICINE

## 2018-06-19 PROCEDURE — 74011250636 HC RX REV CODE- 250/636: Performed by: INTERNAL MEDICINE

## 2018-06-19 RX ORDER — SODIUM CHLORIDE 9 MG/ML
250 INJECTION, SOLUTION INTRAVENOUS AS NEEDED
Status: DISCONTINUED | OUTPATIENT
Start: 2018-06-19 | End: 2018-06-21 | Stop reason: HOSPADM

## 2018-06-19 RX ORDER — INSULIN LISPRO 100 [IU]/ML
8 INJECTION, SOLUTION INTRAVENOUS; SUBCUTANEOUS ONCE
Status: COMPLETED | OUTPATIENT
Start: 2018-06-19 | End: 2018-06-19

## 2018-06-19 RX ORDER — LEVOFLOXACIN 5 MG/ML
750 INJECTION, SOLUTION INTRAVENOUS
Status: DISCONTINUED | OUTPATIENT
Start: 2018-06-20 | End: 2018-06-20 | Stop reason: SDUPTHER

## 2018-06-19 RX ADMIN — ACETAMINOPHEN 650 MG: 325 TABLET ORAL at 22:09

## 2018-06-19 RX ADMIN — INSULIN LISPRO 2 UNITS: 100 INJECTION, SOLUTION INTRAVENOUS; SUBCUTANEOUS at 07:01

## 2018-06-19 RX ADMIN — LEVOTHYROXINE SODIUM 88 MCG: 88 TABLET ORAL at 07:01

## 2018-06-19 RX ADMIN — INSULIN GLARGINE 10 UNITS: 100 INJECTION, SOLUTION SUBCUTANEOUS at 21:17

## 2018-06-19 RX ADMIN — DOCUSATE SODIUM 100 MG: 100 CAPSULE, LIQUID FILLED ORAL at 09:05

## 2018-06-19 RX ADMIN — Medication 10 ML: at 07:01

## 2018-06-19 RX ADMIN — Medication 10 ML: at 14:00

## 2018-06-19 RX ADMIN — ANASTROZOLE 1 MG: 1 TABLET, COATED ORAL at 09:09

## 2018-06-19 RX ADMIN — Medication 1 TABLET: at 09:05

## 2018-06-19 RX ADMIN — CLOPIDOGREL BISULFATE 75 MG: 75 TABLET ORAL at 09:03

## 2018-06-19 RX ADMIN — INSULIN LISPRO 5 UNITS: 100 INJECTION, SOLUTION INTRAVENOUS; SUBCUTANEOUS at 12:29

## 2018-06-19 RX ADMIN — HEPARIN SODIUM 5000 UNITS: 5000 INJECTION, SOLUTION INTRAVENOUS; SUBCUTANEOUS at 20:25

## 2018-06-19 RX ADMIN — HEPARIN SODIUM 5000 UNITS: 5000 INJECTION, SOLUTION INTRAVENOUS; SUBCUTANEOUS at 09:05

## 2018-06-19 RX ADMIN — OXYCODONE HYDROCHLORIDE AND ACETAMINOPHEN 1000 MG: 500 TABLET ORAL at 09:02

## 2018-06-19 RX ADMIN — Medication 1 CAPSULE: at 09:02

## 2018-06-19 RX ADMIN — ASPIRIN 81 MG: 81 TABLET, COATED ORAL at 09:04

## 2018-06-19 RX ADMIN — POLYETHYLENE GLYCOL 3350 17 G: 17 POWDER, FOR SOLUTION ORAL at 09:01

## 2018-06-19 RX ADMIN — PIPERACILLIN SODIUM AND TAZOBACTAM SODIUM 4.5 G: 4; .5 INJECTION, POWDER, LYOPHILIZED, FOR SOLUTION INTRAVENOUS at 01:30

## 2018-06-19 RX ADMIN — DOCUSATE SODIUM 100 MG: 100 CAPSULE, LIQUID FILLED ORAL at 17:22

## 2018-06-19 RX ADMIN — PIPERACILLIN SODIUM AND TAZOBACTAM SODIUM 4.5 G: 4; .5 INJECTION, POWDER, LYOPHILIZED, FOR SOLUTION INTRAVENOUS at 21:09

## 2018-06-19 RX ADMIN — FUROSEMIDE 80 MG: 40 TABLET ORAL at 09:03

## 2018-06-19 RX ADMIN — CARVEDILOL 6.25 MG: 6.25 TABLET, FILM COATED ORAL at 17:22

## 2018-06-19 RX ADMIN — PREDNISONE 20 MG: 20 TABLET ORAL at 09:03

## 2018-06-19 RX ADMIN — PIPERACILLIN SODIUM AND TAZOBACTAM SODIUM 4.5 G: 4; .5 INJECTION, POWDER, LYOPHILIZED, FOR SOLUTION INTRAVENOUS at 12:30

## 2018-06-19 RX ADMIN — Medication 10 ML: at 21:18

## 2018-06-19 RX ADMIN — INSULIN LISPRO 8 UNITS: 100 INJECTION, SOLUTION INTRAVENOUS; SUBCUTANEOUS at 18:41

## 2018-06-19 RX ADMIN — INSULIN LISPRO 10 UNITS: 100 INJECTION, SOLUTION INTRAVENOUS; SUBCUTANEOUS at 21:17

## 2018-06-19 RX ADMIN — FOLIC ACID 1 MG: 1 TABLET ORAL at 09:02

## 2018-06-19 RX ADMIN — CARVEDILOL 6.25 MG: 6.25 TABLET, FILM COATED ORAL at 09:04

## 2018-06-19 RX ADMIN — CHOLECALCIFEROL TAB 10 MCG (400 UNIT) 1000 UNITS: 10 TAB at 09:01

## 2018-06-19 NOTE — PROGRESS NOTES
-Hematology / Oncology ([facility]) -  -Primary Oncologist- Fahad BustillosCC- Breast CA    -S- Denies chest pain- GI or  bleeding    -O-      Patient Vitals for the past 24 hrs:   Temp Pulse Resp BP SpO2   06/19/18 0400 97.7 °F (36.5 °C) 72 18 112/46 98 %   06/19/18 0000 98 °F (36.7 °C) 76 17 119/50 99 %   06/18/18 2000 98 °F (36.7 °C) 76 20 107/52 93 %   06/18/18 1600 97.9 °F (36.6 °C) 80 21 121/54 93 %   06/18/18 1200 97.9 °F (36.6 °C) 84 24 126/67 93 %   06/18/18 0900 97.8 °F (36.6 °C) 81 20 123/51 98 %   06/18/18 0800 - 84 19 118/57 97 %        Gen: nad  Chest: good BS  Cardiac: rrr no tachycardia  Abd: s/nt  Neuro: AAOx 3 non focal    -Labs-    Recent Labs      06/18/18   0457  06/17/18   0557   WBC  5.9  7.0   HGB  7.8*  8.3*   PLT  234  271   ANEU  4.2  5.3   NA  142  137   K  3.9  4.2   GLU  99  91   BUN  51*  62*   CREA  1.41*  1.50*   CA  8.8  8.7         -Assessment + Plan-     *)Met breast ca- ER+ - Known to Dr. Fahad Johnson. Prev h/o colon ca but new breast cancer diagnosed  -Continue arimidex daily  -Cont Ibrance 125 mg po once daily - Initiated c1d1 6/11/2015   -will need CBC monitoring following discharge    -Expect neutropenia  - Has JEROD but crcl >15 so continue current dose     *)CHF/CAD -Per Dr Andrei Melgar and Cardiology. *)Dispo - likely will need SNF    *)Anemia --progressive/recurrent-- last PRBC 4/2018 -will get labs/monitor stools for blood-- she has had transfusion in the past. Risk /benefits of transfusion reviewed. .

## 2018-06-19 NOTE — PROGRESS NOTES
The plan remains for patient to be discharged to [facility] [phone] when medically stable. CM spoke with Maybarb Zamorano in admissions and she needs to get new date for the authorization from insurance company. She asked that Cm call her as soon as discharge date is determined. Patient is still on 2 liters 02. If that is the case, she will need ambulance transport. Cristiano Dowd [phone]/ or tia Meeks [phone] are  willing to transport if safe-but it will likely be ambulance       Dr Anthon Gitelman will inform CM when patient is medically ready for discharge--     Palliative consulted by Dr Anthon Gitelman.

## 2018-06-19 NOTE — WOUND CARE
Wound Care Note:     Follow-up visit for sacrum    Chart shows:  Admitted for NSTEMI  Past Medical History:   Diagnosis Date    Anemia 9/2/2009    Colon cancer ([facility] 9/2/2009    surgery/chemo    Colon cancer ([facility] 9/2/2009    DM (diabetes mellitus) ([facility] 9/2/2009    GERD (gastroesophageal reflux disease)     H/O: CVA 9/2/2009    slight l sided weakness    Hypothyroid 9/2/2009    Ill-defined condition     seasonal allergies    Microalbuminuria 9/2/2009    Osteoporosis 9/2/2009    Other and unspecified hyperlipidemia 1/27/2010    Rheumatoid arthritis involving ankle ([facility] 9/28/2016    Rheumatoid arthritis(714.0) 9/2/2009    Unspecified essential hypertension 9/2/2009    Weakness due to cerebrovascular accident      WBC = 5.9 on 6/18/18  Admitted from [address]   Patient is A&O x 4, communicative, continent with no assistance needed in repositioning. Bed: Total Care Sport  Patient has a Alford. Diet: Diabetic consistent carb regular; 2 grams sodium  Patient reports no pain    Bilateral heels, buttocks, and sacral skin intact and without erythema. 1. POA sacrum is intact, no erythema. Foam dressing applied for protection. Patient repositioned on right side   Heels offloaded on pillow. Recommendations:    Continue with current order. Sacrum- foam dressing for protection only. Please change if wet from urine/stool or coming loose. Dressing is at patient request for comfort and sacrum can be left open to air should patient change her mind. Skin Care & Pressure Prevention:  Minimize layers of linen/pads under patient to optimize support surface. Turn/reposition approximately every 2 hours and offload heels.   Manage incontinence / promote continence     Discussed above plan with patient & ARIANA Perea    Transition of Care: Plan to follow as needed while admitted to hospital.    Jasmina Coto, FREDON, RN, [address] [place] [address] Dr  office [phone]  pager [phone] or call  to page

## 2018-06-19 NOTE — PROGRESS NOTES
Nephrology Progress Note  Richelle Shore  Date of Admission : 6/12/2018    CC: Follow up for JEROD       Assessment and Plan     JEROD on CKD:  - 2/2 ATN + labile HTN and diuretics   - renal panel now  - cont current diuretics  - daily labs while here    CKD Stage III :  - baseline Cr 0.9-1 mg/dl       NSTEMI :  - medical management per cardiology    Acute Systolic HF:  - cont current diuresis    Resp Failure:  - improving  - on BiPAP QHS  - per pulm      Metastatic Breast Ca:  - per oncology     Anemia :  - per oncology       Interval History:  Seen and examined. Feeling better, diuresing well. No labs this AM.  No other complaints by patient. Current Medications: all current  Medications have been eviewed in EPIC  Review of Systems: Pertinent items are noted in HPI. Objective:  Vitals:    Vitals:    06/19/18 0000 06/19/18 0400 06/19/18 0500 06/19/18 0800   BP: 119/50 112/46  93/71   Pulse: 76 72  71   Resp: 17 18 19   Temp: 98 °F (36.7 °C) 97.7 °F (36.5 °C)  97.5 °F (36.4 °C)   SpO2: 99% 98%  100%   Weight:   58.3 kg (128 lb 8.5 oz)    Height:         Intake and Output:  06/19 0701 - 06/19 1900  In: -   Out: 45 [Urine:45]  06/17 1901 - 06/19 0700  In: 500 [I.V.:500]  Out: 2340 [Urine:2340]    Physical Examination:  Pt intubated     No  General: NAD,Conversant, frail  Neck:  Supple, no mass  Resp:  Lungs CTA B/L, no wheezing , normal respiratory effort  CV:  RRR,  no murmur or rub, no LE edema  GI:  Soft, NT, + Bowel sounds, no hepatosplenomegaly  Neurologic:  Non focal  Psych:             Mild confusion, normal mood and affect  Skin:  No Rash    []    High complexity decision making was performed  []    Patient is at high-risk of decompensation with multiple organ involvement    Lab Data Personally Reviewed: I have reviewed all the pertinent labs, microbiology data and radiology studies during assessment.     Recent Labs      06/18/18   0457  06/17/18   0557   NA  142  137   K  3.9  4.2   CL  104  104   CO2 25  24   GLU  99  91   BUN  51*  62*   CREA  1.41*  1.50*   CA  8.8  8.7     Recent Labs      06/18/18   0457  06/17/18   0557   WBC  5.9  7.0   HGB  7.8*  8.3*   HCT  25.1*  26.6*   PLT  234  271     No results found for: SDES  Lab Results   Component Value Date/Time    Culture result: (A) 06/16/2018 12:27 AM     STAPHYLOCOCCUS SPECIES, COAGULASE NEGATIVE GROWING IN 1 OF 4 BOTTLES DRAWN (SITE = R ARM)    Culture result: REMAINING BOTTLE(S) HAS/HAVE NO GROWTH SO FAR 06/16/2018 12:27 AM    Culture result: MRSA NOT PRESENT 05/19/2018 10:09 PM    Culture result:  05/19/2018 10:09 PM         Screening of patient nares for MRSA is for surveillance purposes and, if positive, to facilitate isolation considerations in high risk settings. It is not intended for automatic decolonization interventions per se as regimens are not sufficiently effective to warrant routine use. Recent Results (from the past 24 hour(s))   GLUCOSE, POC    Collection Time: 06/18/18 12:24 PM   Result Value Ref Range    Glucose (POC) 184 (H) 65 - 100 mg/dL    Performed by FAHE[facility] [employer]    GLUCOSE, POC    Collection Time: 06/18/18  4:12 PM   Result Value Ref Range    Glucose (POC) 255 (H) 65 - 100 mg/dL    Performed by FAHEEM WATSON    GLUCOSE, POC    Collection Time: 06/18/18  9:34 PM   Result Value Ref Range    Glucose (POC) 316 (H) 65 - 100 mg/dL    Performed by [address], POC    Collection Time: 06/19/18  6:54 AM   Result Value Ref Range    Glucose (POC) 164 (H) 65 - 100 mg/dL    Performed by Merlinda Leyland, MD  [facility]   [address] [address]  Phone - [phone]   Fax - [phone]  www. [email or web address]

## 2018-06-19 NOTE — PROGRESS NOTES
Cardiology Progress Note            Admit Date: 6/12/2018  Admit Diagnosis: NSTEMI (non-ST elevated myocardial infarction) [facility])  Date: 6/19/2018     Time: 8:50 AM    HPI: Pt with severe multivessel CAD, multiple NSTEMI,  metastatic breast cancer, ckd. Presents 6/11/18 from [facility] with chest pressure and SOB. Mild elevation in troponin- peak 0.16. Subjective:  States breathing better this a.m. States that Bipap overnight helps. Denies chest pain but c/o left sided breast pain. Received additional 40mg IV lasix x 1 yesterday due to pulm edema on CXR. Assessment and Plan     1. Elevated troponin/CAD: severe multivessel disease. Not candidate for CABG or intervention.  -Troponin peak this admission 0.16  -Troponin elevation likely represents demand from fluid overload, not NSTEMI  -12 lead EKG:  ST, lateral ST/T wave abnormality, not significantly changed from 6/3  -Chest pain resolved currently   -Continue ASA, plavix, BB, and repatha. No statin due to intolerance in past.    2. Acute on chronic systolic CHF: NYHA class IV on admission, class III today.  -6/1/18 echo EF 30%. NWMA.    -Net -1435 mls/24 hours    -Lasix 80 mg po daily  -Continue BB. No ace-I due to renal function      3. CKD: Stage III. Creatinine 1.41 6/18 (baseline=1.2)  -Labs pending today  -Hold Ace-I  -Nephrology following  -Will likely need to tolerate a little higher than baseline creatinine to diurese.      4. Hx of HTN:  BP controlled  -Holding lisinopril due to renal function  -Continue Coreg     5. Anemia: hgb 7.8  -PRBC transfusion per heme onc     7. Hx of metastatic breast cancer (current) and colon ca. -Per heme/onc    8. Advanced directive: DNR. ?needs DDNR. Rehab at [facility] planned at discharge. AM labs pending. PRBC transfusion today per heme onc. Dr. Juan C Abarca to see pt this afternoon. Berenice Clemente.  MARIETTA Montana 6/19/2018 8:00 AM   Cardiology Attending:Patient seen and examined. I agree with NP assessment and plans. Wheezing, anemia is progressive. Trent Howard MD 6/19/2018 2:05 PM           Objective:      Physical Exam:                Visit Vitals    BP 93/71 (BP 1 Location: Right arm, BP Patient Position: At rest)    Pulse 71    Temp 97.5 °F (36.4 °C)    Resp 19    Ht 5' 1\" (1.549 m)    Wt 58.3 kg (128 lb 8.5 oz)    SpO2 100%    BMI 24.29 kg/m2          General Appearance:   Alert and oriented x 3, and   individual in no acute distress. Ears/Nose/Mouth/Throat:    Hearing grossly normal.         Neck:  Supple. Chest:    Crackles right base, diminished left base. No wheezes noted. Cardiovascular:   Regular rate and rhythm, S1, S2 normal, no murmur noted. Abdomen:    soft, nontender   Extremities:  No edema bilaterally. Skin:  Warm and dry.        Telemetry: normal sinus rhythm          Data Review:    Labs:    Recent Results (from the past 24 hour(s))   GLUCOSE, POC    Collection Time: 06/18/18 12:24 PM   Result Value Ref Range    Glucose (POC) 184 (H) 65 - 100 mg/dL    Performed by [facility] [employer]    GLUCOSE, POC    Collection Time: 06/18/18  4:12 PM   Result Value Ref Range    Glucose (POC) 255 (H) 65 - 100 mg/dL    Performed by [employer]    GLUCOSE, POC    Collection Time: 06/18/18  9:34 PM   Result Value Ref Range    Glucose (POC) 316 (H) 65 - 100 mg/dL    Performed by Jayant Tirado    GLUCOSE, POC    Collection Time: 06/19/18  6:54 AM   Result Value Ref Range    Glucose (POC) 164 (H) 65 - 100 mg/dL    Performed by Carla Stephen           Radiology:        Current Facility-Administered Medications   Medication Dose Route Frequency    0.9% sodium chloride infusion 250 mL  250 mL IntraVENous PRN    predniSONE (DELTASONE) tablet 20 mg  20 mg Oral DAILY WITH BREAKFAST    insulin glargine (LANTUS) injection 10 Units  10 Units SubCUTAneous QHS    heparin (porcine) injection 5,000 Units  5,000 Units SubCUTAneous Q12H    benzocaine-menthol (CEPACOL) lozenge 1 Lozenge  1 Lozenge Mucous Membrane PRN    simethicone (MYLICON) tablet 80 mg  80 mg Oral PRN    sodium chloride (NS) flush 5-10 mL  5-10 mL IntraVENous PRN    piperacillin-tazobactam (ZOSYN) 4.5 g in 0.9% sodium chloride (MBP/ADV) 100 mL  4.5 g IntraVENous Q12H    levoFLOXacin (LEVAQUIN) 500 mg in D5W IVPB  500 mg IntraVENous Q48H    furosemide (LASIX) tablet 80 mg  80 mg Oral DAILY    methyl salicylate-menthol (BENGAY) 15-10 % cream   Topical PRN    carvedilol (COREG) tablet 6.25 mg  6.25 mg Oral BID WITH MEALS    polyethylene glycol (MIRALAX) packet 17 g  17 g Oral DAILY    insulin lispro (HUMALOG) injection   SubCUTAneous AC&HS    glucose chewable tablet 16 g  4 Tab Oral PRN    dextrose (D50W) injection syrg 12.5-25 g  12.5-25 g IntraVENous PRN    glucagon (GLUCAGEN) injection 1 mg  1 mg IntraMUSCular PRN    anastrozole (ARIMIDEX) tablet 1 mg  1 mg Oral DAILY    ascorbic acid (vitamin C) (VITAMIN C) tablet 1,000 mg  1,000 mg Oral DAILY    aspirin delayed-release tablet 81 mg  81 mg Oral DAILY    cholecalciferol (VITAMIN D3) tablet 1,000 Units  1,000 Units Oral DAILY    clopidogrel (PLAVIX) tablet 75 mg  75 mg Oral DAILY    docusate sodium (COLACE) capsule 100 mg  100 mg Oral BID    epoetin celio (EPOGEN;PROCRIT) injection 10,000 Units  10,000 Units SubCUTAneous Q MON, WED & FRI    folic acid (FOLVITE) tablet 1 mg  1 mg Oral DAILY    lactobac ac& pc-s.therm-b.anim (BERYL Q/RISAQUAD)  1 Cap Oral DAILY    levothyroxine (SYNTHROID) tablet 88 mcg  88 mcg Oral ACB    nitroglycerin (NITROSTAT) tablet 0.4 mg  0.4 mg SubLINGual PRN    sodium chloride (NS) flush 5-10 mL  5-10 mL IntraVENous Q8H    sodium chloride (NS) flush 5-10 mL  5-10 mL IntraVENous PRN    acetaminophen (TYLENOL) tablet 650 mg  650 mg Oral Q4H PRN    ondansetron (ZOFRAN) injection 4 mg  4 mg IntraVENous Q4H PRN    evolocumab (REPATHA) syringe 140 mg (Patient Supplied)  140 mg SubCUTAneous Q 14 DAYS    teriparatide (FORTEO) injection 20 mcg (Patient Supplied)  20 mcg SubCUTAneous DAILY    palbociclib cap 125 mg (Patient Supplied)  125 mg Oral DAILY WITH LUNCH    b-complex with vitamin c tablet 1 Tab (Patient Supplied)  1 Tab Oral DAILY         MARIETTA Espitia MD     [facility] [address] [place] [address], [address] [place] [address],[address]   [facility], [facility]   [phone]

## 2018-06-19 NOTE — PROGRESS NOTES
Problem: Falls - Risk of  Goal: *Absence of Falls  Document Yaron Fall Risk and appropriate interventions in the flowsheet. Outcome: Progressing Towards Goal  Fall Risk Interventions:  Mobility Interventions: Assess mobility with egress test, Bed/chair exit alarm, Patient to call before getting OOB         Medication Interventions: Assess postural VS orthostatic hypotension, Bed/chair exit alarm, Evaluate medications/consider consulting pharmacy, Patient to call before getting OOB    Elimination Interventions: Call light in reach, Patient to call for help with toileting needs, Toileting schedule/hourly rounds    History of Falls Interventions: Bed/chair exit alarm, Evaluate medications/consider consulting pharmacy, Room close to nurse's station        Problem: Pressure Injury - Risk of  Goal: *Prevention of pressure injury  Document Calderon Scale and appropriate interventions in the flowsheet. Offload heels  Turn approximately every 2 hours or remind patient to turn   Outcome: Progressing Towards Goal  Pressure Injury Interventions:  Sensory Interventions: Assess changes in LOC, Assess need for specialty bed, Sit a 90-degree angle/use footstool if needed, Turn and reposition approx. every two hours (pillows and wedges if needed), Pressure redistribution bed/mattress (bed type)    Moisture Interventions: Absorbent underpads, Apply protective barrier, creams and emollients, Check for incontinence Q2 hours and as needed, Limit adult briefs, Moisture barrier    Activity Interventions: Assess need for specialty bed, Increase time out of bed, Pressure redistribution bed/mattress(bed type)    Mobility Interventions: Assess need for specialty bed, Float heels, HOB 30 degrees or less, Pressure redistribution bed/mattress (bed type), PT/OT evaluation, Turn and reposition approx.  every two hours(pillow and wedges)    Nutrition Interventions: Document food/fluid/supplement intake    Friction and Shear Interventions: Apply protective barrier, creams and emollients, Foam dressings/transparent film/skin sealants, HOB 30 degrees or less, Lift team/patient mobility team

## 2018-06-19 NOTE — PROGRESS NOTES
1700: TRANSFER - IN REPORT:  Verbal report received from [address] Mer[address] Abraham RN(name) on [place]  being received from ICU(unit) for routine progression of care    Report consisted of patients Situation, Background, Assessment and   Recommendations(SBAR). Information from the following report(s) SBAR, Kardex, Procedure Summary, Intake/Output, MAR, Accordion, Recent Results, Med Rec Status and Cardiac Rhythm NSR was reviewed with the receiving nurse. Opportunity for questions and clarification was provided. Assessment completed upon patients arrival to unit and care assumed. 1732: Patient arrived on unit. Tele-monitor placed and confirmed with monitor tech. 1845: Blood transfusion completed. 1900: Bedside shift change report given to Analia Burns RN (oncoming nurse) by Lester Neil RN (offgoing nurse). Report included the following information SBAR, Kardex, Procedure Summary, Intake/Output, MAR, Accordion, Recent Results, Med Rec Status and Cardiac Rhythm NSR.

## 2018-06-19 NOTE — CDMP QUERY
CMS considers documentation to be conflicting in nature when one condition is diagnosed as two different conditions by the same or different physicians. Pt presentation: Dx. Acute on Chronic systolic CHF w/NSTEMI. On 06/18: pt went into flash pul edema. Cr. 1.67 (06/19)  from 1.41  (06/18). Received Lasix IV and BIPAP. On the progress note, Dr. Caroll Libman (PN 06/19)  documents that the patient has Acute kidney failure on CKD  but the same condition is documented as chronic kidney disease stage 3  by Dr. Christo Alston on his/her progress note (06/18).    => Acute kidney failure on CKD 2/2 acute tubular necrosis, labile HTN and diuretics requiring lab monitoring, nephrology following with recommendations. =>Chronic kidney disease stage 3 requiring lab monitoring, nephrology following with recommendations.  => Other explanation of clinical findings  => Clinically Undetermined (no explanation for clinical findings)    Please clarify and document your clinical opinion in the progress notes and discharge summary including the definitive and/or presumptive diagnosis, (suspected or probable), related to the above clinical findings. Please include clinical findings supporting your diagnosis.     Thank you,    Rajendra Payan, RN, BSN, [facility], [address] [place]  [phone]

## 2018-06-19 NOTE — PROGRESS NOTES
1930- Bedside and Verbal shift change report given to Jevon RN (oncoming nurse) by [address] RN (offgoing nurse). Report included the following information SBAR, Kardex, ED Summary, Intake/Output, MAR, Recent Results, Med Rec Status and Cardiac Rhythm NSR.     0730- Bedside and Verbal shift change report given to [address] [facility] (oncoming nurse) by Radha De León RN (offgoing nurse). Report included the following information SBAR, Kardex, ED Summary, Procedure Summary, Intake/Output, MAR, Recent Results, Med Rec Status and Cardiac Rhythm NSR. Shift Summary: Uneventful shift. Patient requested to be on BIPAP overnight even though patient's O2 Sat is 98% on 2L NC. No new complaints, still awaiting telemetry bed or be d/c to [facility].

## 2018-06-19 NOTE — PROGRESS NOTES
Hospitalist Progress Note  Chantelle Petersen MD  Answering service: [phone] OR [phone] from in house phone      Date of Service:  2018  NAME:  Danielle Escalera YOB: 1935  MRN:  [id number]      Admission Summary:   81 yo woman with CAD, chronic systolic heart failure, metastatic left breast CA, HTN, DM2, hypothyroidism, DLD, RA, and CKD Stage 3 was BIBEMS to the ED from [facility] on 18 with SOB and hypoxia. The patient has had 4 myocardial infarctions since April of this year, each episode presenting with shortness of breath. She has had multiple admissions to this hospital for evaluation and treatment of NSTEMI. Patient was admitted with NSTEMI and acute on chronic systolic heart failure.     Interval history / Subjective:   Feeling better, reports using BiPAP twice last night; currently getting blood transfusion and now on 2L O2 NC; d/w nurse     Assessment & Plan:     Acute recurrent respiratory failure (POA) - due to fluid overload  - still using BiPAP qhs and now on 2L NC  - PCCM following  - will recommend Trilogy if pt qualifies    Elevated troponin with severe multivessel CAD (POA) - not NSTEMI per Cardiology and not a candidate for intervention  - likely due to fluid overload  - continue ASA, Plavix, BB, Repatha  - no statin due to h/o intolerance    Acute on chronic systolic heart failure, NYHA Class 4 on admission (POA)  - currently NYHA Class 3  - TTE  EF 30%, no RWMA  - continue Lasix and BB  - no ACEi/ARB due to CKD    JEROD on CKD Stage 3 - due to ATN and diuretics  - Renal following  - continue current meds for now    Probable right wrist/hand gout flare - prednisone taper    Anemia of chronic illness - currently getting PRBC transfusion ordered by Oncology  - continue heparin SC due to high risk of developing VTE    Metastatic left breast CA - Oncology following  - on Arimedex and Ibrance    HTN - controlled on current meds  - lisinopril stopped due to JEROD    DM2 with hyperglycemia - likely steroid-induced  - continue Lantus, SSI    H/o colon CA     Code status: DNR  DVT prophylaxis: heparin SC    Care Plan discussed with: Patient/Family, Nurse and   Disposition: KEIKO. Came from [address] [place] [address] but has bed at [facility]. Needs Trilogy for discharge     Hospital Problems  Date Reviewed: 6/18/2018    None            Review of Systems:   Pertinent items are noted in HPI. Vital Signs:    Last 24hrs VS reviewed since prior progress note.  Most recent are:  Visit Vitals    /60 (BP 1 Location: Right arm, BP Patient Position: At rest)    Pulse 77    Temp 97.6 °F (36.4 °C)    Resp 18    Ht 5' 1\" (1.549 m)    Wt 58.3 kg (128 lb 8.5 oz)    SpO2 97%    BMI 24.29 kg/m2         Intake/Output Summary (Last 24 hours) at 06/19/18 1558  Last data filed at 06/19/18 1522   Gross per 24 hour   Intake              200 ml   Output             1735 ml   Net            -1535 ml        Physical Examination:             Constitutional:  awake, no acute distress, cooperative, pleasant, NC in place   ENT:  oral mucosa moist, oropharynx benign    Resp:  few rales, no wheezing   CV:  regular rhythm, normal rate, no m/r/g appreciated, no peripheral edema    GI:  +BS, soft, non distended, non tender     Musculoskeletal:  moves all extremities    Neurologic:  AAOx3, NFD       Data Review:    Review and/or order of clinical lab test  Review and/or order of tests in the radiology section of CPT  Review and/or order of tests in the medicine section of CPT    Labs:     Recent Labs      06/18/18   0457  06/17/18   0557   WBC  5.9  7.0   HGB  7.8*  8.3*   HCT  25.1*  26.6*   PLT  234  271     Recent Labs      06/19/18   1208  06/18/18   0457  06/17/18   0557   NA  135*  142  137   K  3.8  3.9  4.2   CL  98  104  104   CO2  27  25  24   BUN  60*  51*  62*   CREA  1.67*  1.41*  1.50*   GLU  260*  99  91   CA  8.4*  8.8  8.7   PHOS  3.7   --    -- Recent Labs      06/19/18   1208   ALB  2.6*     No results for input(s): INR, PTP, APTT in the last 72 hours. No lab exists for component: INREXT   No results for input(s): FE, TIBC, PSAT, FERR in the last 72 hours. No results found for: FOL, RBCF   No results for input(s): PH, PCO2, PO2 in the last 72 hours. No results for input(s): CPK, CKNDX, TROIQ in the last 72 hours.     No lab exists for component: CPKMB  Lab Results   Component Value Date/Time    Cholesterol, total 74 04/16/2018 04:21 AM    HDL Cholesterol 25 04/16/2018 04:21 AM    LDL, calculated 31.6 04/16/2018 04:21 AM    Triglyceride 87 04/16/2018 04:21 AM    CHOL/HDL Ratio 3.0 04/16/2018 04:21 AM     Lab Results   Component Value Date/Time    Glucose (POC) 290 (H) 06/19/2018 12:25 PM    Glucose (POC) 164 (H) 06/19/2018 06:54 AM    Glucose (POC) 316 (H) 06/18/2018 09:34 PM    Glucose (POC) 255 (H) 06/18/2018 04:12 PM    Glucose (POC) 184 (H) 06/18/2018 12:24 PM     Lab Results   Component Value Date/Time    Color YELLOW/STRAW 06/16/2018 12:29 AM    Appearance CLOUDY (A) 06/16/2018 12:29 AM    Specific gravity 1.010 06/16/2018 12:29 AM    pH (UA) 6.0 06/16/2018 12:29 AM    Protein TRACE (A) 06/16/2018 12:29 AM    Glucose 100 (A) 06/16/2018 12:29 AM    Ketone NEGATIVE  06/16/2018 12:29 AM    Bilirubin NEGATIVE  06/16/2018 12:29 AM    Urobilinogen 0.2 06/16/2018 12:29 AM    Nitrites NEGATIVE  06/16/2018 12:29 AM    Leukocyte Esterase LARGE (A) 06/16/2018 12:29 AM    Epithelial cells FEW 06/16/2018 12:29 AM    Bacteria 1+ (A) 06/16/2018 12:29 AM    WBC 20-50 06/16/2018 12:29 AM    RBC 5-10 06/16/2018 12:29 AM         Medications Reviewed:     Current Facility-Administered Medications   Medication Dose Route Frequency    0.9% sodium chloride infusion 250 mL  250 mL IntraVENous PRN    [START ON 6/20/2018] levoFLOXacin (LEVAQUIN) 750 mg in D5W IVPB  750 mg IntraVENous Q48H    piperacillin-tazobactam (ZOSYN) 4.5 g in 0.9% sodium chloride (MBP/ADV) 100 mL  4.5 g IntraVENous Q8H    predniSONE (DELTASONE) tablet 20 mg  20 mg Oral DAILY WITH BREAKFAST    insulin glargine (LANTUS) injection 10 Units  10 Units SubCUTAneous QHS    heparin (porcine) injection 5,000 Units  5,000 Units SubCUTAneous Q12H    benzocaine-menthol (CEPACOL) lozenge 1 Lozenge  1 Lozenge Mucous Membrane PRN    simethicone (MYLICON) tablet 80 mg  80 mg Oral PRN    sodium chloride (NS) flush 5-10 mL  5-10 mL IntraVENous PRN    furosemide (LASIX) tablet 80 mg  80 mg Oral DAILY    methyl salicylate-menthol (BENGAY) 15-10 % cream   Topical PRN    carvedilol (COREG) tablet 6.25 mg  6.25 mg Oral BID WITH MEALS    polyethylene glycol (MIRALAX) packet 17 g  17 g Oral DAILY    insulin lispro (HUMALOG) injection   SubCUTAneous AC&HS    glucose chewable tablet 16 g  4 Tab Oral PRN    dextrose (D50W) injection syrg 12.5-25 g  12.5-25 g IntraVENous PRN    glucagon (GLUCAGEN) injection 1 mg  1 mg IntraMUSCular PRN    anastrozole (ARIMIDEX) tablet 1 mg  1 mg Oral DAILY    ascorbic acid (vitamin C) (VITAMIN C) tablet 1,000 mg  1,000 mg Oral DAILY    aspirin delayed-release tablet 81 mg  81 mg Oral DAILY    cholecalciferol (VITAMIN D3) tablet 1,000 Units  1,000 Units Oral DAILY    clopidogrel (PLAVIX) tablet 75 mg  75 mg Oral DAILY    docusate sodium (COLACE) capsule 100 mg  100 mg Oral BID    epoetin celio (EPOGEN;PROCRIT) injection 10,000 Units  10,000 Units SubCUTAneous Q MON, WED & FRI    folic acid (FOLVITE) tablet 1 mg  1 mg Oral DAILY    lactobac ac& pc-s.therm-b.anim (BERYL Q/RISAQUAD)  1 Cap Oral DAILY    levothyroxine (SYNTHROID) tablet 88 mcg  88 mcg Oral ACB    nitroglycerin (NITROSTAT) tablet 0.4 mg  0.4 mg SubLINGual PRN    sodium chloride (NS) flush 5-10 mL  5-10 mL IntraVENous Q8H    sodium chloride (NS) flush 5-10 mL  5-10 mL IntraVENous PRN    acetaminophen (TYLENOL) tablet 650 mg  650 mg Oral Q4H PRN    ondansetron (ZOFRAN) injection 4 mg  4 mg IntraVENous Q4H PRN  evolocumab (REPATHA) syringe 140 mg (Patient Supplied)  140 mg SubCUTAneous Q 14 DAYS    teriparatide (FORTEO) injection 20 mcg (Patient Supplied)  20 mcg SubCUTAneous DAILY    palbociclib cap 125 mg (Patient Supplied)  125 mg Oral DAILY WITH LUNCH    b-complex with vitamin c tablet 1 Tab (Patient Supplied)  1 Tab Oral DAILY     ______________________________________________________________________  EXPECTED LENGTH OF STAY: 4d 7h  ACTUAL LENGTH OF STAY:          [address] MD

## 2018-06-19 NOTE — DIABETES MGMT
DTC Progress Note    Recommendations/ Comments: Chart reviewed due to hyperglycemia, likely due to steroids. Pt is on Prednisone 20 mg daily. Pt has received 16 units of correction insulin in the past 24 hours. If appropriate, please consider   - Adding Glimepiride 1 mg if PO intake>50%  - Adding NPH 6 units given with Prednisone    Current hospital DM medication: correction scale Humalog, normal sensitivity, Lantus 10 units daily  Also on Prednisone 20 mg daily     Chart reviewed on Sandy Ledesma. Patient is a 82 y.o. female with known diabetes on Amaryl 1 mg and Januvia 50 mg at home. A1c:   Lab Results   Component Value Date/Time    Hemoglobin A1c 7.0 (H) 04/18/2018 08:14 AM    Hemoglobin A1c 6.9 (H) 11/30/2011 08:30 AM       Recent Glucose Results:   Lab Results   Component Value Date/Time     (H) 06/19/2018 12:08 PM    GLUCPOC 290 (H) 06/19/2018 12:25 PM    GLUCPOC 164 (H) 06/19/2018 06:54 AM    GLUCPOC 316 (H) 06/18/2018 09:34 PM        Lab Results   Component Value Date/Time    Creatinine 1.67 (H) 06/19/2018 12:08 PM     Estimated Creatinine Clearance: 21.7 mL/min (based on Cr of 1.67). Active Orders   Diet    DIET DIABETIC CONSISTENT CARB Regular; 2 GM NA (House Low NA)        PO intake:   No data found. Will continue to follow as needed.     Thank you  Ashley Monreal RD

## 2018-06-19 NOTE — PROGRESS NOTES
NUTRITION- DIETETIC tECHnICIAN    Pt seen for:       [x]                  Rescreen  []                  Food preferences/tolerances  []                  Food Allergies  []                  PO intake check  []                  Supplements  []                  Diet order clarification  []                  Education  []                  Other     Rescreen:    [x]                  Not at Nutrition Risk, rescreen per screening protocol  []                  At Nutrition Risk- RD referral         SUBJECTIVE/OBJECTIVE:     Information obtained from:  patient      Diet:  Regular Consistent Carbohydrate 1800 Kcal, 2 gm Na    Intake: satisfactory    No data found. Weight Changes: Wt Readings from Last 5 Encounters:   06/19/18 58.3 kg (128 lb 8.5 oz)   06/06/18 58.7 kg (129 lb 6.6 oz)   05/25/18 54.5 kg (120 lb 2.4 oz)   05/11/18 61.7 kg (136 lb)   04/24/18 59.9 kg (132 lb)       Problems Identified:      [x]                  Patient making a fair effort to eat and usually consumes approximately 50% of trays. Does not eat pork, tomatoes or broccoli. Obtained menu selections for next three meals and encouraged her to fill in menu sent on breakfast tray daily. Will replace lunch today with preferred foods. Denies n/v/c/d. Reports stable weight and intake pta.    []                  Specified food preferences   []                  Dislikes supplements              []                  Allergies:   []                  Difficulty chewing      []                  Dentition    []                  Nausea/Vomiting   []                  Constipation   []                  Diarrhea    PLAN:     [x]                   Continue current diet and encourage intake  []                   Obtained/adjusted food preferences/tolerances and/or snacks options   []                   Dislikes supplements will try a substitution  []                   Modify diet for food allergies  []                   Adjust texture due to difficulty chewing []                   Educated patient  []                   RD Referral  [x]                   Rescreen per screening protocol          Brianna Mendez, JOVANR

## 2018-06-19 NOTE — PROGRESS NOTES
Problem: Falls - Risk of  Goal: *Absence of Falls  Document Yaron Fall Risk and appropriate interventions in the flowsheet. Outcome: Progressing Towards Goal  Fall Risk Interventions:  Mobility Interventions: Bed/chair exit alarm, Patient to call before getting OOB         Medication Interventions: Assess postural VS orthostatic hypotension, Teach patient to arise slowly    Elimination Interventions: Call light in reach, Toileting schedule/hourly rounds    History of Falls Interventions: Room close to nurse's station, Evaluate medications/consider consulting pharmacy        Problem: Pressure Injury - Risk of  Goal: *Prevention of pressure injury  Document Calderon Scale and appropriate interventions in the flowsheet. Offload heels  Turn approximately every 2 hours or remind patient to turn   Outcome: Progressing Towards Goal  Pressure Injury Interventions:  Sensory Interventions: Assess changes in LOC, Check visual cues for pain, Keep linens dry and wrinkle-free    Moisture Interventions: Absorbent underpads, Maintain skin hydration (lotion/cream)    Activity Interventions: Assess need for specialty bed, Increase time out of bed    Mobility Interventions: Assess need for specialty bed, HOB 30 degrees or less, Turn and reposition approx.  every two hours(pillow and wedges)    Nutrition Interventions: Document food/fluid/supplement intake    Friction and Shear Interventions: Apply protective barrier, creams and emollients, HOB 30 degrees or less

## 2018-06-20 LAB
ABO + RH BLD: NORMAL
ALBUMIN SERPL-MCNC: 2.5 G/DL (ref 3.5–5)
ALBUMIN/GLOB SERPL: 0.6 {RATIO} (ref 1.1–2.2)
ALP SERPL-CCNC: 399 U/L (ref 45–117)
ALT SERPL-CCNC: 17 U/L (ref 12–78)
ANION GAP SERPL CALC-SCNC: 12 MMOL/L (ref 5–15)
AST SERPL-CCNC: 13 U/L (ref 15–37)
BASOPHILS # BLD: 0 K/UL (ref 0–0.1)
BASOPHILS NFR BLD: 0 % (ref 0–1)
BILIRUB SERPL-MCNC: 0.5 MG/DL (ref 0.2–1)
BLD PROD TYP BPU: NORMAL
BLOOD GROUP ANTIBODIES SERPL: NORMAL
BNP SERPL-MCNC: ABNORMAL PG/ML (ref 0–450)
BPU ID: NORMAL
BUN SERPL-MCNC: 63 MG/DL (ref 6–20)
BUN/CREAT SERPL: 38 (ref 12–20)
CALCIUM SERPL-MCNC: 8.9 MG/DL (ref 8.5–10.1)
CHLORIDE SERPL-SCNC: 100 MMOL/L (ref 97–108)
CO2 SERPL-SCNC: 24 MMOL/L (ref 21–32)
CREAT SERPL-MCNC: 1.68 MG/DL (ref 0.55–1.02)
CROSSMATCH RESULT,%XM: NORMAL
DIFFERENTIAL METHOD BLD: ABNORMAL
EOSINOPHIL # BLD: 0 K/UL (ref 0–0.4)
EOSINOPHIL NFR BLD: 0 % (ref 0–7)
ERYTHROCYTE [DISTWIDTH] IN BLOOD BY AUTOMATED COUNT: 19.7 % (ref 11.5–14.5)
FERRITIN SERPL-MCNC: 53 NG/ML (ref 8–252)
GLOBULIN SER CALC-MCNC: 4.3 G/DL (ref 2–4)
GLUCOSE BLD STRIP.AUTO-MCNC: 199 MG/DL (ref 65–100)
GLUCOSE BLD STRIP.AUTO-MCNC: 204 MG/DL (ref 65–100)
GLUCOSE BLD STRIP.AUTO-MCNC: 211 MG/DL (ref 65–100)
GLUCOSE BLD STRIP.AUTO-MCNC: 253 MG/DL (ref 65–100)
GLUCOSE BLD STRIP.AUTO-MCNC: 286 MG/DL (ref 65–100)
GLUCOSE SERPL-MCNC: 266 MG/DL (ref 65–100)
HCT VFR BLD AUTO: 30.1 % (ref 35–47)
HGB BLD-MCNC: 9.7 G/DL (ref 11.5–16)
IMM GRANULOCYTES # BLD: 0 K/UL (ref 0–0.04)
IMM GRANULOCYTES NFR BLD AUTO: 1 % (ref 0–0.5)
LDH SERPL L TO P-CCNC: 135 U/L (ref 81–246)
LYMPHOCYTES # BLD: 1.3 K/UL (ref 0.8–3.5)
LYMPHOCYTES NFR BLD: 19 % (ref 12–49)
MAGNESIUM SERPL-MCNC: 1.6 MG/DL (ref 1.6–2.4)
MCH RBC QN AUTO: 27.6 PG (ref 26–34)
MCHC RBC AUTO-ENTMCNC: 32.2 G/DL (ref 30–36.5)
MCV RBC AUTO: 85.8 FL (ref 80–99)
MONOCYTES # BLD: 0.4 K/UL (ref 0–1)
MONOCYTES NFR BLD: 6 % (ref 5–13)
NEUTS SEG # BLD: 5 K/UL (ref 1.8–8)
NEUTS SEG NFR BLD: 74 % (ref 32–75)
NRBC # BLD: 0 K/UL (ref 0–0.01)
NRBC BLD-RTO: 0 PER 100 WBC
PHOSPHATE SERPL-MCNC: 3.8 MG/DL (ref 2.6–4.7)
PLATELET # BLD AUTO: 209 K/UL (ref 150–400)
PMV BLD AUTO: 9.4 FL (ref 8.9–12.9)
POTASSIUM SERPL-SCNC: 3.9 MMOL/L (ref 3.5–5.1)
PROT SERPL-MCNC: 6.8 G/DL (ref 6.4–8.2)
RBC # BLD AUTO: 3.51 M/UL (ref 3.8–5.2)
RETICS # AUTO: 0.07 M/UL (ref 0.02–0.08)
RETICS/RBC NFR AUTO: 1.9 % (ref 0.7–2.1)
SERVICE CMNT-IMP: ABNORMAL
SODIUM SERPL-SCNC: 136 MMOL/L (ref 136–145)
SPECIMEN EXP DATE BLD: NORMAL
STATUS OF UNIT,%ST: NORMAL
UNIT DIVISION, %UDIV: 0
WBC # BLD AUTO: 6.7 K/UL (ref 3.6–11)

## 2018-06-20 PROCEDURE — 83615 LACTATE (LD) (LDH) ENZYME: CPT | Performed by: INTERNAL MEDICINE

## 2018-06-20 PROCEDURE — 74011250637 HC RX REV CODE- 250/637: Performed by: INTERNAL MEDICINE

## 2018-06-20 PROCEDURE — 82728 ASSAY OF FERRITIN: CPT | Performed by: INTERNAL MEDICINE

## 2018-06-20 PROCEDURE — 76450000000

## 2018-06-20 PROCEDURE — 80053 COMPREHEN METABOLIC PANEL: CPT | Performed by: INTERNAL MEDICINE

## 2018-06-20 PROCEDURE — 74011250636 HC RX REV CODE- 250/636: Performed by: INTERNAL MEDICINE

## 2018-06-20 PROCEDURE — 85025 COMPLETE CBC W/AUTO DIFF WBC: CPT | Performed by: INTERNAL MEDICINE

## 2018-06-20 PROCEDURE — 74011250636 HC RX REV CODE- 250/636: Performed by: NURSE PRACTITIONER

## 2018-06-20 PROCEDURE — 65660000000 HC RM CCU STEPDOWN

## 2018-06-20 PROCEDURE — 74011636637 HC RX REV CODE- 636/637: Performed by: INTERNAL MEDICINE

## 2018-06-20 PROCEDURE — 36415 COLL VENOUS BLD VENIPUNCTURE: CPT | Performed by: INTERNAL MEDICINE

## 2018-06-20 PROCEDURE — 74011000258 HC RX REV CODE- 258: Performed by: INTERNAL MEDICINE

## 2018-06-20 PROCEDURE — 84100 ASSAY OF PHOSPHORUS: CPT | Performed by: INTERNAL MEDICINE

## 2018-06-20 PROCEDURE — 74011250637 HC RX REV CODE- 250/637: Performed by: SPECIALIST

## 2018-06-20 PROCEDURE — 82962 GLUCOSE BLOOD TEST: CPT

## 2018-06-20 PROCEDURE — 87040 BLOOD CULTURE FOR BACTERIA: CPT | Performed by: INTERNAL MEDICINE

## 2018-06-20 PROCEDURE — 85045 AUTOMATED RETICULOCYTE COUNT: CPT | Performed by: INTERNAL MEDICINE

## 2018-06-20 PROCEDURE — 97535 SELF CARE MNGMENT TRAINING: CPT | Performed by: OCCUPATIONAL THERAPIST

## 2018-06-20 PROCEDURE — 83880 ASSAY OF NATRIURETIC PEPTIDE: CPT | Performed by: NURSE PRACTITIONER

## 2018-06-20 PROCEDURE — 83735 ASSAY OF MAGNESIUM: CPT | Performed by: INTERNAL MEDICINE

## 2018-06-20 PROCEDURE — 74011250637 HC RX REV CODE- 250/637: Performed by: NURSE PRACTITIONER

## 2018-06-20 PROCEDURE — 94660 CPAP INITIATION&MGMT: CPT

## 2018-06-20 PROCEDURE — 74011250636 HC RX REV CODE- 250/636: Performed by: SPECIALIST

## 2018-06-20 PROCEDURE — 74011250637 HC RX REV CODE- 250/637: Performed by: FAMILY MEDICINE

## 2018-06-20 RX ORDER — MAGNESIUM SULFATE HEPTAHYDRATE 40 MG/ML
2 INJECTION, SOLUTION INTRAVENOUS ONCE
Status: COMPLETED | OUTPATIENT
Start: 2018-06-20 | End: 2018-06-20

## 2018-06-20 RX ORDER — LEVOFLOXACIN 750 MG/1
750 TABLET ORAL
Status: DISCONTINUED | OUTPATIENT
Start: 2018-06-21 | End: 2018-06-21 | Stop reason: HOSPADM

## 2018-06-20 RX ADMIN — ASPIRIN 81 MG: 81 TABLET, COATED ORAL at 08:29

## 2018-06-20 RX ADMIN — Medication 10 ML: at 14:00

## 2018-06-20 RX ADMIN — OXYCODONE HYDROCHLORIDE AND ACETAMINOPHEN 1000 MG: 500 TABLET ORAL at 08:28

## 2018-06-20 RX ADMIN — PREDNISONE 20 MG: 20 TABLET ORAL at 08:29

## 2018-06-20 RX ADMIN — CLOPIDOGREL BISULFATE 75 MG: 75 TABLET ORAL at 08:29

## 2018-06-20 RX ADMIN — ERYTHROPOIETIN 10000 UNITS: 10000 INJECTION, SOLUTION INTRAVENOUS; SUBCUTANEOUS at 18:46

## 2018-06-20 RX ADMIN — DOCUSATE SODIUM 100 MG: 100 CAPSULE, LIQUID FILLED ORAL at 08:28

## 2018-06-20 RX ADMIN — INSULIN GLARGINE 10 UNITS: 100 INJECTION, SOLUTION SUBCUTANEOUS at 21:50

## 2018-06-20 RX ADMIN — INSULIN LISPRO 5 UNITS: 100 INJECTION, SOLUTION INTRAVENOUS; SUBCUTANEOUS at 21:50

## 2018-06-20 RX ADMIN — CARVEDILOL 6.25 MG: 6.25 TABLET, FILM COATED ORAL at 08:29

## 2018-06-20 RX ADMIN — HEPARIN SODIUM 5000 UNITS: 5000 INJECTION, SOLUTION INTRAVENOUS; SUBCUTANEOUS at 20:11

## 2018-06-20 RX ADMIN — CARVEDILOL 6.25 MG: 6.25 TABLET, FILM COATED ORAL at 17:40

## 2018-06-20 RX ADMIN — PIPERACILLIN SODIUM AND TAZOBACTAM SODIUM 4.5 G: 4; .5 INJECTION, POWDER, LYOPHILIZED, FOR SOLUTION INTRAVENOUS at 12:18

## 2018-06-20 RX ADMIN — PIPERACILLIN SODIUM AND TAZOBACTAM SODIUM 4.5 G: 4; .5 INJECTION, POWDER, LYOPHILIZED, FOR SOLUTION INTRAVENOUS at 05:36

## 2018-06-20 RX ADMIN — POLYETHYLENE GLYCOL 3350 17 G: 17 POWDER, FOR SOLUTION ORAL at 09:00

## 2018-06-20 RX ADMIN — Medication 1 CAPSULE: at 08:29

## 2018-06-20 RX ADMIN — FOLIC ACID 1 MG: 1 TABLET ORAL at 08:29

## 2018-06-20 RX ADMIN — Medication 1 TABLET: at 09:00

## 2018-06-20 RX ADMIN — Medication 10 ML: at 21:50

## 2018-06-20 RX ADMIN — DOCUSATE SODIUM 100 MG: 100 CAPSULE, LIQUID FILLED ORAL at 17:40

## 2018-06-20 RX ADMIN — CHOLECALCIFEROL TAB 10 MCG (400 UNIT) 1000 UNITS: 10 TAB at 08:28

## 2018-06-20 RX ADMIN — LEVOFLOXACIN 750 MG: 5 INJECTION, SOLUTION INTRAVENOUS at 01:03

## 2018-06-20 RX ADMIN — LEVOTHYROXINE SODIUM 88 MCG: 88 TABLET ORAL at 06:38

## 2018-06-20 RX ADMIN — ANASTROZOLE 1 MG: 1 TABLET, COATED ORAL at 09:56

## 2018-06-20 RX ADMIN — MAGNESIUM SULFATE HEPTAHYDRATE 2 G: 40 INJECTION, SOLUTION INTRAVENOUS at 10:03

## 2018-06-20 RX ADMIN — INSULIN LISPRO 3 UNITS: 100 INJECTION, SOLUTION INTRAVENOUS; SUBCUTANEOUS at 06:38

## 2018-06-20 RX ADMIN — HEPARIN SODIUM 5000 UNITS: 5000 INJECTION, SOLUTION INTRAVENOUS; SUBCUTANEOUS at 08:29

## 2018-06-20 RX ADMIN — INSULIN LISPRO 5 UNITS: 100 INJECTION, SOLUTION INTRAVENOUS; SUBCUTANEOUS at 16:30

## 2018-06-20 RX ADMIN — FUROSEMIDE 80 MG: 40 TABLET ORAL at 08:29

## 2018-06-20 RX ADMIN — Medication 10 ML: at 05:37

## 2018-06-20 RX ADMIN — INSULIN LISPRO 4 UNITS: 100 INJECTION, SOLUTION INTRAVENOUS; SUBCUTANEOUS at 11:30

## 2018-06-20 RX ADMIN — PIPERACILLIN SODIUM AND TAZOBACTAM SODIUM 4.5 G: 4; .5 INJECTION, POWDER, LYOPHILIZED, FOR SOLUTION INTRAVENOUS at 21:51

## 2018-06-20 NOTE — PROGRESS NOTES
Hospitalist Progress Note  Chantelle Petersen MD  Answering service: [phone] OR [phone] from in house phone      Date of Service:  2018  NAME:  Danielle Escalera YOB: 1935  MRN:  [id number]      Admission Summary:   81 yo woman with CAD, chronic systolic heart failure, metastatic left breast CA, HTN, DM2, hypothyroidism, DLD, RA, and CKD Stage 3 was BIBEMS to the ED from [facility] on 18 with SOB and hypoxia. The patient has had 4 myocardial infarctions since April of this year, each episode presenting with shortness of breath. She has had multiple admissions to this hospital for evaluation and treatment of NSTEMI. Patient was admitted with NSTEMI and acute on chronic systolic heart failure.     Interval history / Subjective:   C/o feeling weak and fatigued; used BiPAP last night; d/w nurse     Assessment & Plan:     Acute recurrent respiratory failure (POA) - due to fluid overload  - still using BiPAP qhs and now on 2L NC  - PCCM signed off  - will try to get Trilogy on discharge    Elevated troponin with severe multivessel CAD (POA) - not NSTEMI per Cardiology and not a candidate for intervention  - likely due to fluid overload  - continue ASA, Plavix, BB, Repatha  - no statin due to h/o intolerance    Acute on chronic systolic heart failure, NYHA Class 4 on admission (POA)  - currently NYHA Class 3  - TTE  EF 30%, no RWMA  - continue Lasix and BB  - no ACEi/ARB due to CKD    JEROD on CKD Stage 3 - due to ATN and diuretics  - Renal following  - continue current meds for now    Probable right wrist/hand gout flare - prednisone taper    Anemia of chronic illness - s/p PRBC transfusion ordered by Oncology  - continue heparin SC due to high risk of developing VTE    Metastatic left breast CA - Oncology following  - on Arimedex and Ibrance    HTN - controlled on current meds  - lisinopril stopped due to JEROD    DM2 with hyperglycemia - likely steroid-induced  - continue Lantus, SSI    H/o colon CA     Code status: DNR  DVT prophylaxis: heparin SC    Care Plan discussed with: Patient/Family, Nurse and   Disposition: TBD. Came from [facility] but has bed at [facility]. Needs Trilogy for discharge     Hospital Problems  Date Reviewed: 6/18/2018    None        Review of Systems:   Pertinent items are noted in HPI. Vital Signs:    Last 24hrs VS reviewed since prior progress note.  Most recent are:  Visit Vitals    /80 (BP 1 Location: Right arm, BP Patient Position: Sitting;Post activity)    Pulse 68    Temp 97.8 °F (36.6 °C)    Resp 18    Ht 5' 1\" (1.549 m)    Wt 60.9 kg (134 lb 4.2 oz)    SpO2 99%    BMI 25.37 kg/m2       Intake/Output Summary (Last 24 hours) at 06/20/18 1047  Last data filed at 06/20/18 0537   Gross per 24 hour   Intake            572.1 ml   Output             1500 ml   Net           -927.9 ml      Physical Examination:     Constitutional:  awake, no acute distress, cooperative, pleasant, NC in place   ENT:  oral mucosa moist, oropharynx benign    Resp:  few rales, no wheezing   CV:  regular rhythm, normal rate, no m/r/g appreciated, no peripheral edema    GI:  +BS, soft, non distended, non tender     Musculoskeletal:  moves all extremities    Neurologic:  AAOx3, NFD       Data Review:    Review and/or order of clinical lab test  Review and/or order of tests in the radiology section of CPT  Review and/or order of tests in the medicine section of CPT    Labs:     Recent Labs      06/20/18   0300  06/18/18   0457   WBC  6.7  5.9   HGB  9.7*  7.8*   HCT  30.1*  25.1*   PLT  209  234     Recent Labs      06/20/18   0300  06/19/18   1208  06/18/18   0457   NA  136  135*  142   K  3.9  3.8  3.9   CL  100  98  104   CO2  24  27  25   BUN  63*  60*  51*   CREA  1.68*  1.67*  1.41*   GLU  266*  260*  99   CA  8.9  8.4*  8.8   MG  1.6   --    --    PHOS  3.8  3.7   --      Recent Labs      06/20/18   0300  06/19/18 1208   SGOT  13*   --    ALT  17   --    AP  399*   --    TBILI  0.5   --    TP  6.8   --    ALB  2.5*  2.6*   GLOB  4.3*   --      No results for input(s): INR, PTP, APTT in the last 72 hours. No lab exists for component: INREXT, INREXT   Recent Labs      06/20/18   0300   FERR  53      No results found for: FOL, RBCF   No results for input(s): PH, PCO2, PO2 in the last 72 hours. No results for input(s): CPK, CKNDX, TROIQ in the last 72 hours.     No lab exists for component: CPKMB  Lab Results   Component Value Date/Time    Cholesterol, total 74 04/16/2018 04:21 AM    HDL Cholesterol 25 04/16/2018 04:21 AM    LDL, calculated 31.6 04/16/2018 04:21 AM    Triglyceride 87 04/16/2018 04:21 AM    CHOL/HDL Ratio 3.0 04/16/2018 04:21 AM     Lab Results   Component Value Date/Time    Glucose (POC) 204 (H) 06/20/2018 06:32 AM    Glucose (POC) 199 (H) 06/20/2018 05:36 AM    Glucose (POC) 398 (H) 06/19/2018 09:06 PM    Glucose (POC) 364 (H) 06/19/2018 05:16 PM    Glucose (POC) 290 (H) 06/19/2018 12:25 PM     Lab Results   Component Value Date/Time    Color YELLOW/STRAW 06/16/2018 12:29 AM    Appearance CLOUDY (A) 06/16/2018 12:29 AM    Specific gravity 1.010 06/16/2018 12:29 AM    pH (UA) 6.0 06/16/2018 12:29 AM    Protein TRACE (A) 06/16/2018 12:29 AM    Glucose 100 (A) 06/16/2018 12:29 AM    Ketone NEGATIVE  06/16/2018 12:29 AM    Bilirubin NEGATIVE  06/16/2018 12:29 AM    Urobilinogen 0.2 06/16/2018 12:29 AM    Nitrites NEGATIVE  06/16/2018 12:29 AM    Leukocyte Esterase LARGE (A) 06/16/2018 12:29 AM    Epithelial cells FEW 06/16/2018 12:29 AM    Bacteria 1+ (A) 06/16/2018 12:29 AM    WBC 20-50 06/16/2018 12:29 AM    RBC 5-10 06/16/2018 12:29 AM     Medications Reviewed:     Current Facility-Administered Medications   Medication Dose Route Frequency    magnesium sulfate 2 g/50 ml IVPB (premix or compounded)  2 g IntraVENous ONCE    0.9% sodium chloride infusion 250 mL  250 mL IntraVENous PRN    levoFLOXacin (LEVAQUIN) 750 mg in D5W IVPB  750 mg IntraVENous Q48H    piperacillin-tazobactam (ZOSYN) 4.5 g in 0.9% sodium chloride (MBP/ADV) 100 mL  4.5 g IntraVENous Q8H    predniSONE (DELTASONE) tablet 20 mg  20 mg Oral DAILY WITH BREAKFAST    insulin glargine (LANTUS) injection 10 Units  10 Units SubCUTAneous QHS    heparin (porcine) injection 5,000 Units  5,000 Units SubCUTAneous Q12H    benzocaine-menthol (CEPACOL) lozenge 1 Lozenge  1 Lozenge Mucous Membrane PRN    simethicone (MYLICON) tablet 80 mg  80 mg Oral PRN    sodium chloride (NS) flush 5-10 mL  5-10 mL IntraVENous PRN    furosemide (LASIX) tablet 80 mg  80 mg Oral DAILY    methyl salicylate-menthol (BENGAY) 15-10 % cream   Topical PRN    carvedilol (COREG) tablet 6.25 mg  6.25 mg Oral BID WITH MEALS    polyethylene glycol (MIRALAX) packet 17 g  17 g Oral DAILY    insulin lispro (HUMALOG) injection   SubCUTAneous AC&HS    glucose chewable tablet 16 g  4 Tab Oral PRN    dextrose (D50W) injection syrg 12.5-25 g  12.5-25 g IntraVENous PRN    glucagon (GLUCAGEN) injection 1 mg  1 mg IntraMUSCular PRN    anastrozole (ARIMIDEX) tablet 1 mg  1 mg Oral DAILY    ascorbic acid (vitamin C) (VITAMIN C) tablet 1,000 mg  1,000 mg Oral DAILY    aspirin delayed-release tablet 81 mg  81 mg Oral DAILY    cholecalciferol (VITAMIN D3) tablet 1,000 Units  1,000 Units Oral DAILY    clopidogrel (PLAVIX) tablet 75 mg  75 mg Oral DAILY    docusate sodium (COLACE) capsule 100 mg  100 mg Oral BID    epoetin celio (EPOGEN;PROCRIT) injection 10,000 Units  10,000 Units SubCUTAneous Q MON, WED & FRI    folic acid (FOLVITE) tablet 1 mg  1 mg Oral DAILY    lactobac ac& pc-s.therm-b.anim (BERYL Q/RISAQUAD)  1 Cap Oral DAILY    levothyroxine (SYNTHROID) tablet 88 mcg  88 mcg Oral ACB    nitroglycerin (NITROSTAT) tablet 0.4 mg  0.4 mg SubLINGual PRN    sodium chloride (NS) flush 5-10 mL  5-10 mL IntraVENous Q8H    sodium chloride (NS) flush 5-10 mL  5-10 mL IntraVENous PRN    acetaminophen (TYLENOL) tablet 650 mg  650 mg Oral Q4H PRN    ondansetron (ZOFRAN) injection 4 mg  4 mg IntraVENous Q4H PRN    evolocumab (REPATHA) syringe 140 mg (Patient Supplied)  140 mg SubCUTAneous Q 14 DAYS    teriparatide (FORTEO) injection 20 mcg (Patient Supplied)  20 mcg SubCUTAneous DAILY    palbociclib cap 125 mg (Patient Supplied)  125 mg Oral DAILY WITH LUNCH    b-complex with vitamin c tablet 1 Tab (Patient Supplied)  1 Tab Oral DAILY     ______________________________________________________________________  EXPECTED LENGTH OF STAY: 4d 7h  ACTUAL LENGTH OF STAY:          [address] [facility] [place]

## 2018-06-20 NOTE — PROGRESS NOTES
Cardiology Progress Note            Admit Date: 6/12/2018  Admit Diagnosis: NSTEMI (non-ST elevated myocardial infarction) [facility])  Date: 6/20/2018     Time: 8:50 AM    Subjective:  Denies chest pain. Denies SOB currently. States she got OOB to chair yesterday without SOB. Feels better after PRBC transfusion. Used bipap overnight which she states helps. Assessment and Plan     1. Elevated troponin/CAD: severe multivessel disease. Not candidate for CABG or intervention.  -Chest pain resolved currently  -Troponin peak this admission 0.16 and likely represents demand from fluid overload, not NSTEMI  -12 lead EKG:  ST, lateral ST/T wave abnormality, not significantly changed from 6/3  --Continue ASA, plavix, BB, and repatha. 2. Acute on chronic systolic CHF: NYHA class IV on admission, class III today.  -6/1/18 echo EF 30%. NWMA.    -Net -1017  mls/24 hours    -Lasix 80 mg po daily  -Continue BB. No ace-I due to renal function      3. JEROD on CKD: Stage III. Creatinine 1.68 today from 1.67  -Hold Ace-I  -Nephrology following  -Likely need to tolerate a little higher than baseline creatinine to diurese.      4. Hx of HTN:  BP ok this a.m.  -Holding lisinopril due to renal function  -Continue Coreg 6.25 mg BID     5. Anemia: improved s/p PRBC transfusion- now 9.7     6. Hx of metastatic breast cancer (current) and colon ca. -Per heme/onc    7. Advanced directive: DNR. ?needs DDNR. Rehab at SNF planned at discharge. Feeling better after PRBC transfusion. No chest pain. Continue po lasix. Cardiology Attending:Patient seen and examined. I agree with NP assessment and plans. Stable from cardiac standpoint will sign off. Taylor Roy MD 6/20/2018 9:12 AM       Aliyah Montana NP 6/20/2018 8:00 AM         Objective:      Physical Exam:                Visit Vitals    BP (!) 126/35 (BP 1 Location: Right arm, BP Patient Position:  At rest)    Pulse 68    Temp 97.8 °F (36.6 °C)    Resp 18    Ht 5' 1\" (1.549 m)    Wt 60.9 kg (134 lb 4.2 oz)    SpO2 99%    BMI 25.37 kg/m2          General Appearance:   Alert and oriented x 3, and   individual in no acute distress. Ears/Nose/Mouth/Throat:    Hearing grossly normal.         Neck:  Supple. Chest:    Crackles right base, diminished left base. No wheezes noted. Cardiovascular:   Regular rate and rhythm, S1, S2 normal, no murmur noted. Abdomen:    soft, nontender   Extremities:  No edema bilaterally. Skin:  Warm and dry.        Telemetry: normal sinus rhythm          Data Review:    Labs:    Recent Results (from the past 24 hour(s))   TYPE + CROSSMATCH    Collection Time: 06/19/18 12:08 PM   Result Value Ref Range    Crossmatch Expiration 06/22/2018     ABO/Rh(D) O POSITIVE     Antibody screen NEG     Unit number [id number]     Blood component type RC LR     Unit division 00     Status of unit TRANSFUSED     Crossmatch result Compatible    RENAL FUNCTION PANEL    Collection Time: 06/19/18 12:08 PM   Result Value Ref Range    Sodium 135 (L) 136 - 145 mmol/L    Potassium 3.8 3.5 - 5.1 mmol/L    Chloride 98 97 - 108 mmol/L    CO2 27 21 - 32 mmol/L    Anion gap 10 5 - 15 mmol/L    Glucose 260 (H) 65 - 100 mg/dL    BUN 60 (H) 6 - 20 MG/DL    Creatinine 1.67 (H) 0.55 - 1.02 MG/DL    BUN/Creatinine ratio 36 (H) 12 - 20      GFR est AA 36 (L) >60 ml/min/1.73m2    GFR est non-AA 29 (L) >60 ml/min/1.73m2    Calcium 8.4 (L) 8.5 - 10.1 MG/DL    Phosphorus 3.7 2.6 - 4.7 MG/DL    Albumin 2.6 (L) 3.5 - 5.0 g/dL   GLUCOSE, POC    Collection Time: 06/19/18 12:25 PM   Result Value Ref Range    Glucose (POC) 290 (H) 65 - 100 mg/dL    Performed by Vish Mayer    GLUCOSE, POC    Collection Time: 06/19/18  5:16 PM   Result Value Ref Range    Glucose (POC) 364 (H) 65 - 100 mg/dL    Performed by Vish ALICIA, POC    Collection Time: 06/19/18  9:06 PM   Result Value Ref Range    Glucose (POC) 398 (H) 65 - 100 mg/dL    Performed by [employer]    Collection Time: 06/20/18  3:00 AM   Result Value Ref Range    Ferritin 53 8 - 252 NG/ML   RETICULOCYTE COUNT    Collection Time: 06/20/18  3:00 AM   Result Value Ref Range    Reticulocyte count 1.9 0.7 - 2.1 %    Absolute Retic Cnt. 0.0662 0.0164 - 0.0776 M/ul   NT-PRO BNP    Collection Time: 06/20/18  3:00 AM   Result Value Ref Range    NT pro-BNP 49398 (H) 0 - 450 PG/ML   CBC WITH AUTOMATED DIFF    Collection Time: 06/20/18  3:00 AM   Result Value Ref Range    WBC 6.7 3.6 - 11.0 K/uL    RBC 3.51 (L) 3.80 - 5.20 M/uL    HGB 9.7 (L) 11.5 - 16.0 g/dL    HCT 30.1 (L) 35.0 - 47.0 %    MCV 85.8 80.0 - 99.0 FL    MCH 27.6 26.0 - 34.0 PG    MCHC 32.2 30.0 - 36.5 g/dL    RDW 19.7 (H) 11.5 - 14.5 %    PLATELET 831 299 - 713 K/uL    MPV 9.4 8.9 - 12.9 FL    NRBC 0.0 0  WBC    ABSOLUTE NRBC 0.00 0.00 - 0.01 K/uL    NEUTROPHILS 74 32 - 75 %    LYMPHOCYTES 19 12 - 49 %    MONOCYTES 6 5 - 13 %    EOSINOPHILS 0 0 - 7 %    BASOPHILS 0 0 - 1 %    IMMATURE GRANULOCYTES 1 (H) 0.0 - 0.5 %    ABS. NEUTROPHILS 5.0 1.8 - 8.0 K/UL    ABS. LYMPHOCYTES 1.3 0.8 - 3.5 K/UL    ABS. MONOCYTES 0.4 0.0 - 1.0 K/UL    ABS. EOSINOPHILS 0.0 0.0 - 0.4 K/UL    ABS. BASOPHILS 0.0 0.0 - 0.1 K/UL    ABS. IMM. GRANS. 0.0 0.00 - 0.04 K/UL    DF AUTOMATED     METABOLIC PANEL, COMPREHENSIVE    Collection Time: 06/20/18  3:00 AM   Result Value Ref Range    Sodium 136 136 - 145 mmol/L    Potassium 3.9 3.5 - 5.1 mmol/L    Chloride 100 97 - 108 mmol/L    CO2 24 21 - 32 mmol/L    Anion gap 12 5 - 15 mmol/L    Glucose 266 (H) 65 - 100 mg/dL    BUN 63 (H) 6 - 20 MG/DL    Creatinine 1.68 (H) 0.55 - 1.02 MG/DL    BUN/Creatinine ratio 38 (H) 12 - 20      GFR est AA 35 (L) >60 ml/min/1.73m2    GFR est non-AA 29 (L) >60 ml/min/1.73m2    Calcium 8.9 8.5 - 10.1 MG/DL    Bilirubin, total 0.5 0.2 - 1.0 MG/DL    ALT (SGPT) 17 12 - 78 U/L    AST (SGOT) 13 (L) 15 - 37 U/L    Alk.  phosphatase 399 (H) 45 - 117 U/L    Protein, total 6.8 6.4 - 8.2 g/dL    Albumin 2.5 (L) 3.5 - 5.0 g/dL    Globulin 4.3 (H) 2.0 - 4.0 g/dL    A-G Ratio 0.6 (L) 1.1 - 2.2     MAGNESIUM    Collection Time: 06/20/18  3:00 AM   Result Value Ref Range    Magnesium 1.6 1.6 - 2.4 mg/dL   PHOSPHORUS    Collection Time: 06/20/18  3:00 AM   Result Value Ref Range    Phosphorus 3.8 2.6 - 4.7 MG/DL   LD    Collection Time: 06/20/18  3:00 AM   Result Value Ref Range     81 - 246 U/L   GLUCOSE, POC    Collection Time: 06/20/18  5:36 AM   Result Value Ref Range    Glucose (POC) 199 (H) 65 - 100 mg/dL    Performed by Merlin Boros    GLUCOSE, POC    Collection Time: 06/20/18  6:32 AM   Result Value Ref Range    Glucose (POC) 204 (H) 65 - 100 mg/dL    Performed by Bekah Bunn           Radiology:        Current Facility-Administered Medications   Medication Dose Route Frequency    0.9% sodium chloride infusion 250 mL  250 mL IntraVENous PRN    levoFLOXacin (LEVAQUIN) 750 mg in D5W IVPB  750 mg IntraVENous Q48H    piperacillin-tazobactam (ZOSYN) 4.5 g in 0.9% sodium chloride (MBP/ADV) 100 mL  4.5 g IntraVENous Q8H    predniSONE (DELTASONE) tablet 20 mg  20 mg Oral DAILY WITH BREAKFAST    insulin glargine (LANTUS) injection 10 Units  10 Units SubCUTAneous QHS    heparin (porcine) injection 5,000 Units  5,000 Units SubCUTAneous Q12H    benzocaine-menthol (CEPACOL) lozenge 1 Lozenge  1 Lozenge Mucous Membrane PRN    simethicone (MYLICON) tablet 80 mg  80 mg Oral PRN    sodium chloride (NS) flush 5-10 mL  5-10 mL IntraVENous PRN    furosemide (LASIX) tablet 80 mg  80 mg Oral DAILY    methyl salicylate-menthol (BENGAY) 15-10 % cream   Topical PRN    carvedilol (COREG) tablet 6.25 mg  6.25 mg Oral BID WITH MEALS    polyethylene glycol (MIRALAX) packet 17 g  17 g Oral DAILY    insulin lispro (HUMALOG) injection   SubCUTAneous AC&HS    glucose chewable tablet 16 g  4 Tab Oral PRN    dextrose (D50W) injection syrg 12.5-25 g  12.5-25 g IntraVENous PRN    glucagon (GLUCAGEN) injection 1 mg  1 mg IntraMUSCular PRN    anastrozole (ARIMIDEX) tablet 1 mg  1 mg Oral DAILY    ascorbic acid (vitamin C) (VITAMIN C) tablet 1,000 mg  1,000 mg Oral DAILY    aspirin delayed-release tablet 81 mg  81 mg Oral DAILY    cholecalciferol (VITAMIN D3) tablet 1,000 Units  1,000 Units Oral DAILY    clopidogrel (PLAVIX) tablet 75 mg  75 mg Oral DAILY    docusate sodium (COLACE) capsule 100 mg  100 mg Oral BID    epoetin celio (EPOGEN;PROCRIT) injection 10,000 Units  10,000 Units SubCUTAneous Q MON, WED & FRI    folic acid (FOLVITE) tablet 1 mg  1 mg Oral DAILY    lactobac ac& pc-s.therm-b.anim (BERYL Q/RISAQUAD)  1 Cap Oral DAILY    levothyroxine (SYNTHROID) tablet 88 mcg  88 mcg Oral ACB    nitroglycerin (NITROSTAT) tablet 0.4 mg  0.4 mg SubLINGual PRN    sodium chloride (NS) flush 5-10 mL  5-10 mL IntraVENous Q8H    sodium chloride (NS) flush 5-10 mL  5-10 mL IntraVENous PRN    acetaminophen (TYLENOL) tablet 650 mg  650 mg Oral Q4H PRN    ondansetron (ZOFRAN) injection 4 mg  4 mg IntraVENous Q4H PRN    evolocumab (REPATHA) syringe 140 mg (Patient Supplied)  140 mg SubCUTAneous Q 14 DAYS    teriparatide (FORTEO) injection 20 mcg (Patient Supplied)  20 mcg SubCUTAneous DAILY    palbociclib cap 125 mg (Patient Supplied)  125 mg Oral DAILY WITH LUNCH    b-complex with vitamin c tablet 1 Tab (Patient Supplied)  1 Tab Oral DAILY         Henrine Marine, NP Pearson Sandifer, MD     [facility] [address] [place] [address] [phone] [facility] [address]   [facility]   [phone]

## 2018-06-20 NOTE — PROGRESS NOTES
0200 Alford removed per protocol. Purewick placed. 0530 Pt voided 200mL of clear yellow urine. 0730 Bedside shift change report given to Sofie Blackmon (oncoming nurse) by Milan Meadows RN (offgoing nurse). Report included the following information SBAR, Intake/Output, Recent Results and Cardiac Rhythm NSR. Problem: Falls - Risk of  Goal: *Absence of Falls  Document Yaron Fall Risk and appropriate interventions in the flowsheet. Outcome: Progressing Towards Goal  Fall Risk Interventions:  Mobility Interventions: Communicate number of staff needed for ambulation/transfer, Patient to call before getting OOB, PT Consult for mobility concerns, Strengthening exercises (ROM-active/passive), Utilize walker, cane, or other assitive device    Medication Interventions: Teach patient to arise slowly, Patient to call before getting OOB    Elimination Interventions: Call light in reach, Toileting schedule/hourly rounds, Patient to call for help with toileting needs    History of Falls Interventions: Consult care management for discharge planning, Evaluate medications/consider consulting pharmacy, Investigate reason for fall, Room close to nurse's station      Problem: Pressure Injury - Risk of  Goal: *Prevention of pressure injury  Document Calderon Scale and appropriate interventions in the flowsheet. Offload heels  Turn approximately every 2 hours or remind patient to turn   Outcome: Progressing Towards Goal  Pressure Injury Interventions:  Sensory Interventions: Assess changes in LOC, Assess need for specialty bed, Discuss PT/OT consult with provider, Check visual cues for pain, Minimize linen layers, Pressure redistribution bed/mattress (bed type), Turn and reposition approx.  every two hours (pillows and wedges if needed)    Moisture Interventions: Absorbent underpads, Internal/External urinary devices    Activity Interventions: Chair cushion, Increase time out of bed, Pressure redistribution bed/mattress(bed type), PT/OT evaluation    Mobility Interventions: Chair cushion, Float heels, HOB 30 degrees or less, Pressure redistribution bed/mattress (bed type), PT/OT evaluation, Suspension boots, Turn and reposition approx.  every two hours(pillow and wedges)    Nutrition Interventions: Document food/fluid/supplement intake    Friction and Shear Interventions: Apply protective barrier, creams and emollients, Feet elevated on foot rest, Foam dressings/transparent film/skin sealants, Lift team/patient mobility team, Minimize layers, Transferring/repositioning devices          Problem: Unstable Angina/NSTEMI: Discharge Outcomes  Goal: *Hemodynamically stable  Outcome: Progressing Towards Goal  Patient Vitals for the past 12 hrs:   Temp Pulse Resp BP SpO2   06/19/18 1900 98.3 °F (36.8 °C) 80 18 153/62 100 %   06/19/18 1800 - 75 - 167/54 100 %   06/19/18 1732 98.7 °F (37.1 °C) 78 20 144/70 100 %   06/19/18 1700 98.6 °F (37 °C) 78 22 129/58 96 %   06/19/18 1630 98.6 °F (37 °C) - - - -   06/19/18 1600 98.2 °F (36.8 °C) 81 18 131/55 96 %   06/19/18 1545 - 78 21 121/58 96 %   06/19/18 1530 98.2 °F (36.8 °C) 80 20 127/53 97 %   06/19/18 1515 98.2 °F (36.8 °C) 78 23 121/79 97 %   06/19/18 1500 98.1 °F (36.7 °C) 79 22 - 98 %   06/19/18 1445 - 78 21 116/53 97 %   06/19/18 1441 97.6 °F (36.4 °C) 77 18 119/60 97 %   06/19/18 1400 - 78 23 113/69 97 %   06/19/18 1300 - 78 22 123/53 97 %   06/19/18 1200 97.8 °F (36.6 °C) 74 22 110/45 98 %   06/19/18 1100 - 76 19 109/68 98 %       Goal: *Stable cardiac rhythm  Outcome: Resolved/Met Date Met: 06/19/18  Pt is in NSR

## 2018-06-20 NOTE — PROGRESS NOTES
-Hematology / Oncology ([facility]) -  -Primary Oncologist- Lana Shipley  -CC- Breast CA    -S- Denies chest pain- GI or  bleeding  feels better following transfusion of PRBC  No sickness from cancer therapy    -O-      Patient Vitals for the past 24 hrs:   Temp Pulse Resp BP SpO2   06/20/18 0744 97.8 °F (36.6 °C) 68 - (!) 126/35 -   06/20/18 0303 97.8 °F (36.6 °C) 71 18 146/52 99 %   06/19/18 2334 98.3 °F (36.8 °C) 80 18 157/58 99 %   06/19/18 1900 98.3 °F (36.8 °C) 80 18 153/62 100 %   06/19/18 1800 - 75 - 167/54 100 %   06/19/18 1732 98.7 °F (37.1 °C) 78 20 144/70 100 %   06/19/18 1700 98.6 °F (37 °C) 78 22 129/58 96 %   06/19/18 1630 98.6 °F (37 °C) - - - -   06/19/18 1600 98.2 °F (36.8 °C) 81 18 131/55 96 %   06/19/18 1545 - 78 21 121/58 96 %   06/19/18 1530 98.2 °F (36.8 °C) 80 20 127/53 97 %   06/19/18 1515 98.2 °F (36.8 °C) 78 23 121/79 97 %   06/19/18 1500 98.1 °F (36.7 °C) 79 22 - 98 %   06/19/18 1445 - 78 21 116/53 97 %   06/19/18 1441 97.6 °F (36.4 °C) 77 18 119/60 97 %   06/19/18 1400 - 78 23 113/69 97 %   06/19/18 1300 - 78 22 123/53 97 %   06/19/18 1200 97.8 °F (36.6 °C) 74 22 110/45 98 %   06/19/18 1100 - 76 19 109/68 98 %   06/19/18 1000 - 73 21 102/44 98 %   06/19/18 0900 - - 19 105/48 98 %        Gen: nad  Chest: good BS  Cardiac: rrr no tachycardia  Abd: s/nt  Neuro: AAOx 3 non focal    -Labs-    Recent Labs      06/20/18   0300  06/19/18   1208  06/18/18   0457   WBC  6.7   --   5.9   HGB  9.7*   --   7.8*   PLT  209   --   234   ANEU  5.0   --   4.2   NA  136  135*  142   K  3.9  3.8  3.9   GLU  266*  260*  99   BUN  63*  60*  51*   CREA  1.68*  1.67*  1.41*   ALT  17   --    --    SGOT  13*   --    --    TBILI  0.5   --    --    AP  399*   --    --    CA  8.9  8.4*  8.8   MG  1.6   --    --    PHOS  3.8  3.7   --          -Assessment + Plan-     *)Met breast ca- ER+ - Known to Dr. Lana Shipley.  Prev h/o colon ca but new breast cancer diagnosed  -Continue arimidex daily  -Cont Ibrance 125 mg po once daily - Initiated c1d1 6/11/2015   -will need CBC monitoring following discharge    -Expect neutropenia  - Has JEROD but crcl >15 so continue current dose     *)CHF/CAD -Per Dr Wade Vitale and Cardiology.     *)Dispo - likely will need SNF    *)Anemia --progressive/recurrent-- previous  [facility]BC 4/2018 -will get labs/monitor stools for blood-- she has had transfusion in the past. Risk /benefits of transfusion reviewed on 6/19/2018-- tolerated transfusion of PRBC 6/19/2018  Feels better with increased Hgb 6/20/2018

## 2018-06-20 NOTE — PROGRESS NOTES
Nephrology Progress Note  Antony Faith  Date of Admission : 6/12/2018    CC: Follow up for JEROD       Assessment and Plan     JEROD on CKD:  - 2/2 ATN + labile HTN and diuretics   - Cr stable  - cont po lasix  - daily labs while here    CKD Stage III :  - baseline Cr 0.9-1 mg/dl       HTN:  - BP stable  - no changes at this time    NSTEMI :  - medical management per cardiology    Acute Systolic HF:  - cont current diuresis    Resp Failure:  - improving  - on BiPAP QHS  - per pulm      Metastatic Breast Ca:  - per oncology     Anemia :  - per oncology  - transfused PRBCs on 4/19       Interval History:  Seen and examined. Feeling ok. Received PRBCs yesterday. Cr stable, lasix changed to oral once daily. Cr stable. No increase in sob or cp reported. Current Medications: all current  Medications have been eviewed in EPIC  Review of Systems: Pertinent items are noted in HPI. Objective:  Vitals:    Vitals:    06/20/18 0303 06/20/18 0537 06/20/18 0744 06/20/18 0950   BP: 146/52  (!) 126/35 144/80   Pulse: 71  68    Resp: 18      Temp: 97.8 °F (36.6 °C)  97.8 °F (36.6 °C)    SpO2: 99%      Weight:  60.9 kg (134 lb 4.2 oz)     Height:         Intake and Output:     06/18 1901 - 06/20 0700  In: 672.1 [P.O.:120; I.V.:100]  Out: 2255 [Urine:2255]    Physical Examination:  Pt intubated     No  General: NAD,Conversant, frail  Neck:  Supple, no mass  Resp:  Lungs CTA B/L, no wheezing , normal respiratory effort  CV:  RRR,  no murmur or rub, no LE edema  GI:  Soft, NT, + Bowel sounds, no hepatosplenomegaly  Neurologic:  Non focal  Psych:             Mild confusion, normal mood and affect  Skin:  No Rash    []    High complexity decision making was performed  []    Patient is at high-risk of decompensation with multiple organ involvement    Lab Data Personally Reviewed: I have reviewed all the pertinent labs, microbiology data and radiology studies during assessment.     Recent Labs      06/20/18   0300  06/19/18   1208 06/18/18   0457   NA  136  135*  142   K  3.9  3.8  3.9   CL  100  98  104   CO2  24  27  25   GLU  266*  260*  99   BUN  63*  60*  51*   CREA  1.68*  1.67*  1.41*   CA  8.9  8.4*  8.8   MG  1.6   --    --    PHOS  3.8  3.7   --    ALB  2.5*  2.6*   --    SGOT  13*   --    --    ALT  17   --    --      Recent Labs      06/20/18   0300  06/18/18   0457   WBC  6.7  5.9   HGB  9.7*  7.8*   HCT  30.1*  25.1*   PLT  209  234     No results found for: SDES  Lab Results   Component Value Date/Time    Culture result: (A) 06/16/2018 12:27 AM     STAPHYLOCOCCUS SPECIES, COAGULASE NEGATIVE GROWING IN 1 OF 4 BOTTLES DRAWN (SITE = R ARM)    Culture result: REMAINING BOTTLE(S) HAS/HAVE NO GROWTH SO FAR 06/16/2018 12:27 AM    Culture result: MRSA NOT PRESENT 05/19/2018 10:09 PM    Culture result:  05/19/2018 10:09 PM         Screening of patient nares for MRSA is for surveillance purposes and, if positive, to facilitate isolation considerations in high risk settings. It is not intended for automatic decolonization interventions per se as regimens are not sufficiently effective to warrant routine use.      Recent Results (from the past 24 hour(s))   TYPE + CROSSMATCH    Collection Time: 06/19/18 12:08 PM   Result Value Ref Range    Crossmatch Expiration 06/22/2018     ABO/Rh(D) O POSITIVE     Antibody screen NEG     Unit number [id number]     Blood component type  LR     Unit division 00     Status of unit TRANSFUSED     Crossmatch result Compatible    RENAL FUNCTION PANEL    Collection Time: 06/19/18 12:08 PM   Result Value Ref Range    Sodium 135 (L) 136 - 145 mmol/L    Potassium 3.8 3.5 - 5.1 mmol/L    Chloride 98 97 - 108 mmol/L    CO2 27 21 - 32 mmol/L    Anion gap 10 5 - 15 mmol/L    Glucose 260 (H) 65 - 100 mg/dL    BUN 60 (H) 6 - 20 MG/DL    Creatinine 1.67 (H) 0.55 - 1.02 MG/DL    BUN/Creatinine ratio 36 (H) 12 - 20      GFR est AA 36 (L) >60 ml/min/1.73m2    GFR est non-AA 29 (L) >60 ml/min/1.73m2    Calcium 8.4 (L) 8.5 - 10.1 MG/DL    Phosphorus 3.7 2.6 - 4.7 MG/DL    Albumin 2.6 (L) 3.5 - 5.0 g/dL   GLUCOSE, POC    Collection Time: 06/19/18 12:25 PM   Result Value Ref Range    Glucose (POC) 290 (H) 65 - 100 mg/dL    Performed by Shirley Martinez    GLUCOSE, POC    Collection Time: 06/19/18  5:16 PM   Result Value Ref Range    Glucose (POC) 364 (H) 65 - 100 mg/dL    Performed by Shirley Martinez    GLUCOSE, POC    Collection Time: 06/19/18  9:06 PM   Result Value Ref Range    Glucose (POC) 398 (H) 65 - 100 mg/dL    Performed by Samira Shepherd    FERRITIN    Collection Time: 06/20/18  3:00 AM   Result Value Ref Range    Ferritin 53 8 - 252 NG/ML   RETICULOCYTE COUNT    Collection Time: 06/20/18  3:00 AM   Result Value Ref Range    Reticulocyte count 1.9 0.7 - 2.1 %    Absolute Retic Cnt. 0.0662 0.0164 - 0.0776 M/ul   NT-PRO BNP    Collection Time: 06/20/18  3:00 AM   Result Value Ref Range    NT pro-BNP 93113 (H) 0 - 450 PG/ML   CBC WITH AUTOMATED DIFF    Collection Time: 06/20/18  3:00 AM   Result Value Ref Range    WBC 6.7 3.6 - 11.0 K/uL    RBC 3.51 (L) 3.80 - 5.20 M/uL    HGB 9.7 (L) 11.5 - 16.0 g/dL    HCT 30.1 (L) 35.0 - 47.0 %    MCV 85.8 80.0 - 99.0 FL    MCH 27.6 26.0 - 34.0 PG    MCHC 32.2 30.0 - 36.5 g/dL    RDW 19.7 (H) 11.5 - 14.5 %    PLATELET 977 410 - 871 K/uL    MPV 9.4 8.9 - 12.9 FL    NRBC 0.0 0  WBC    ABSOLUTE NRBC 0.00 0.00 - 0.01 K/uL    NEUTROPHILS 74 32 - 75 %    LYMPHOCYTES 19 12 - 49 %    MONOCYTES 6 5 - 13 %    EOSINOPHILS 0 0 - 7 %    BASOPHILS 0 0 - 1 %    IMMATURE GRANULOCYTES 1 (H) 0.0 - 0.5 %    ABS. NEUTROPHILS 5.0 1.8 - 8.0 K/UL    ABS. LYMPHOCYTES 1.3 0.8 - 3.5 K/UL    ABS. MONOCYTES 0.4 0.0 - 1.0 K/UL    ABS. EOSINOPHILS 0.0 0.0 - 0.4 K/UL    ABS. BASOPHILS 0.0 0.0 - 0.1 K/UL    ABS. IMM.  GRANS. 0.0 0.00 - 0.04 K/UL    DF AUTOMATED     METABOLIC PANEL, COMPREHENSIVE    Collection Time: 06/20/18  3:00 AM   Result Value Ref Range    Sodium 136 136 - 145 mmol/L    Potassium 3.9 3.5 - 5.1 mmol/L    Chloride 100 97 - 108 mmol/L    CO2 24 21 - 32 mmol/L    Anion gap 12 5 - 15 mmol/L    Glucose 266 (H) 65 - 100 mg/dL    BUN 63 (H) 6 - 20 MG/DL    Creatinine 1.68 (H) 0.55 - 1.02 MG/DL    BUN/Creatinine ratio 38 (H) 12 - 20      GFR est AA 35 (L) >60 ml/min/1.73m2    GFR est non-AA 29 (L) >60 ml/min/1.73m2    Calcium 8.9 8.5 - 10.1 MG/DL    Bilirubin, total 0.5 0.2 - 1.0 MG/DL    ALT (SGPT) 17 12 - 78 U/L    AST (SGOT) 13 (L) 15 - 37 U/L    Alk. phosphatase 399 (H) 45 - 117 U/L    Protein, total 6.8 6.4 - 8.2 g/dL    Albumin 2.5 (L) 3.5 - 5.0 g/dL    Globulin 4.3 (H) 2.0 - 4.0 g/dL    A-G Ratio 0.6 (L) 1.1 - 2.2     MAGNESIUM    Collection Time: 06/20/18  3:00 AM   Result Value Ref Range    Magnesium 1.6 1.6 - 2.4 mg/dL   PHOSPHORUS    Collection Time: 06/20/18  3:00 AM   Result Value Ref Range    Phosphorus 3.8 2.6 - 4.7 MG/DL   LD    Collection Time: 06/20/18  3:00 AM   Result Value Ref Range     81 - 246 U/L   GLUCOSE, POC    Collection Time: 06/20/18  5:36 AM   Result Value Ref Range    Glucose (POC) 199 (H) 65 - 100 mg/dL    Performed by Duke Agustin    GLUCOSE, POC    Collection Time: 06/20/18  6:32 AM   Result Value Ref Range    Glucose (POC) 204 (H) 65 - 100 mg/dL    Performed by Missy Painter    GLUCOSE, POC    Collection Time: 06/20/18 11:30 AM   Result Value Ref Range    Glucose (POC) 211 (H) 65 - 100 mg/dL    Performed by Tesha Valenzuela MD  [facility]   [address] [address]  Phone - [phone]   Fax - [phone]  [email or web address]

## 2018-06-20 NOTE — PROGRESS NOTES
Problem: Self Care Deficits Care Plan (Adult)  Goal: *Acute Goals and Plan of Care (Insert Text)  Occupational Therapy Goals  Initiated 6/13/2018, Re-evaluation 6/20/2018, no goals met, continue all  1. Patient will perform grooming at the sink with supervision/set-up within 7 day(s). 2.  Patient will perform lower body dressing with supervision within 7 day(s). 3.  Patient will perform bathing with supervision/set-up within 7 day(s). 4.  Patient will perform toilet transfers with supervision/set-up and least restrictive DME within 7 day(s). 5.  Patient will perform all aspects of toileting with supervision/set-up within 7 day(s). 6.  Patient will participate in upper extremity therapeutic exercise/activities with supervision/set-up for 10 minutes within 7 day(s). 7.  Patient will utilize energy conservation techniques during functional activities with verbal cues within 7 day(s). Occupational Therapy TREATMENT: WEEKLY REASSESSMENT  Patient: Jamal Lange (82 y.o. female)  Date: 6/20/2018  Diagnosis: NSTEMI (non-ST elevated myocardial infarction) [facility]) NSTEMI (non-ST elevated myocardial infarction) [facility])       Precautions: Fall  Chart, occupational therapy assessment, plan of care, and goals were reviewed. ASSESSMENT:  Patient requires encouragement to increase participation in functional tasks, relies on caregivers more than needed. No goals met at this time. ADL mobility with min assist using rolling walker. Set-up bedside commode over toilet as she was unable to sit on standard commode due to pain in knees. Instructed to call for assist and ambulate to and from the bathroom throughout the day. LE dressing with min to mod assist, grooming seated with set-up, instructed to perform at sink with stand by assist. Patient on 2 liters and maintained sats > or = 95%. Will continue to follow.   Recommend with nursing patient to complete as able in order to maintain strength, endurance and independence: ADLs with supervision/setup, OOB to chair 3x/day and mobilizing to the bathroom for toileting with min assist. Thank you for your assistance. Progression toward goals:  []            Improving appropriately and progressing toward goals  [x]            Improving slowly and progressing toward goals  []            Not making progress toward goals and plan of care will be adjusted     PLAN:  Goals have been updated based on progression since last assessment. Patient continues to benefit from skilled intervention to address the above impairments. Continue to follow patient 3 times a week to address goals. Planned Interventions:  [x]                    Self Care Training                  [x]             Therapeutic Activities  [x]                    Functional Mobility Training    []             Cognitive Retraining  [x]                    Therapeutic Exercises           [x]             Endurance Activities  [x]                    Balance Training                   []             Neuromuscular Re-Education  []                    Visual/Perceptual Training     [x]        Home Safety Training  [x]                    Patient Education                 [x]             Family Training/Education  []                    Other (comment):  Discharge Recommendations: Rehab  Further Equipment Recommendations for Discharge:      SUBJECTIVE:   Patient stated I did not sleep much.     OBJECTIVE DATA SUMMARY:   Cognitive/Behavioral Status:  Neurologic State: Alert  Orientation Level: Oriented X4  Cognition: Follows commands; Appropriate for age attention/concentration  Perception: Appears intact  Perseveration: No perseveration noted  Safety/Judgement: Awareness of environment; Fall prevention;Home safety    Functional Mobility and Transfers for ADLs:  Bed Mobility:  Supine to Sit: Minimum assistance    Transfers:  Sit to Stand: Contact guard assistance;Minimum assistance;Assist x1  Functional Transfers  Bathroom Mobility: Minimum assistance  Toilet Transfer : Minimum assistance; Adaptive equipment; Additional time;Assist x1   Bed to Chair: Minimum assistance; Adaptive equipment; Additional time;Assist x1    Balance:  Sitting: Intact; Without support  Standing: Intact; With support  Standing - Static: Constant support;Good  Standing - Dynamic : Fair    ADL Intervention:  Feeding  Feeding Assistance: Supervision/set-up (instructed to eat in chair)    Grooming  Grooming Assistance: Supervision/set up (seated, encouraged to do standing)  Washing Face: Supervision/set-up  Washing Hands: Supervision/set-up  Brushing Teeth: Supervision/set-up       Lower Body Dressing Assistance  Dressing Assistance: Minimum assistance (encouragement)  Protective Undergarmet: Minimum assistance  Slip on Shoes with Back: Minimum assistance  Leg Crossed Method Used: No  Position Performed: Seated edge of bed;Seated in chair  Adaptive Equipment Used: Walker    Toileting  Toileting Assistance: Maximum assistance  Bladder Hygiene: Moderate assistance  Bowel Hygiene: Maximum assistance  Clothing Management: Minimum assistance  Adaptive Equipment: Gwendolynn [place]; Other (comment) (bedside commode over toilet)     Patient instructed and indicated understanding the benefits of maintaining activity tolerance, functional mobility, and independence with self care tasks during acute stay  to ensure safe return home and to baseline. Encouraged patient to increase frequency and duration OOB, be out of bed for all meals, perform daily ADLs (as approved by RN/MD regarding bathing etc), and performing functional mobility to/from bathroom. Cognitive Retraining  Safety/Judgement: Awareness of environment; Fall prevention;Home safety      Pain:    no complaint of pain        Activity Tolerance:   Fair, max encouragement for increased participation  Please refer to the flowsheet for vital signs taken during this treatment.   After treatment:   [x] Patient left in no apparent distress sitting up in chair  [] Patient left in no apparent distress in bed  [x] Call bell left within reach  [x] Nursing notified  [x] Caregiver present  [] Bed alarm activated    COMMUNICATION/COLLABORATION:   The patients plan of care was discussed with: Registered Nurse and     Noelle Justin OTR/L  Time Calculation: 33 mins

## 2018-06-20 NOTE — DIABETES MGMT
DTC Progress Note    Recommendations/ Comments: Chart reviewed due to hyperglycemia, likely due to steroids. Pt is on Prednisone 20 mg daily. Pt has received 18 units of correction insulin in the past 24 hours. If appropriate, please consider   - Adding Glimepiride 1 mg if PO intake>50%  - Adding NPH 6 units given with Prednisone    Current hospital DM medication: correction scale Humalog, normal sensitivity, Lantus 10 units daily  Also on Prednisone 20 mg daily     Chart reviewed on Sandy Ledesma. Patient is a 82 y.o. female with known diabetes on Amaryl 1 mg and Januvia 50 mg at home. A1c:   Lab Results   Component Value Date/Time    Hemoglobin A1c 7.0 (H) 04/18/2018 08:14 AM    Hemoglobin A1c 6.9 (H) 11/30/2011 08:30 AM       Recent Glucose Results:   Lab Results   Component Value Date/Time     (H) 06/20/2018 03:00 AM     (H) 06/19/2018 12:08 PM    GLUCPOC 204 (H) 06/20/2018 06:32 AM    GLUCPOC 199 (H) 06/20/2018 05:36 AM    GLUCPOC 398 (H) 06/19/2018 09:06 PM        Lab Results   Component Value Date/Time    Creatinine 1.68 (H) 06/20/2018 03:00 AM     Estimated Creatinine Clearance: 22 mL/min (based on Cr of 1.68). Active Orders   Diet    DIET DIABETIC CONSISTENT CARB Regular; 2 GM NA (House Low NA)        PO intake:   Patient Vitals for the past 72 hrs:   % Diet Eaten   06/19/18 1851 100 %       Will continue to follow as needed.     Thank you  Yady Sims RD, CDE

## 2018-06-20 NOTE — PROGRESS NOTES
ANDRE spoke with Dr Tripp Hoffman and patient does need a BiPaP and not Trio logy at [facility] - order for settings for Bipap sent to [facility] to order for patient - they need 24 hours to have time to set-up Bipap. Per Ava Draft [phone] with [facility] will attempt to obtain authorization for admission to facilty for tomorrow. Per Dr Elio Oliva anticipate patient will not need O2 for transport so son can transport.  JOSE Perez

## 2018-06-20 NOTE — PROGRESS NOTES
Clinical Pharmacy Note: Re: IV to PO Automatic Conversion - Antibiotic    Please note: Morel Mcnair has been changed from IV to PO based on the following criteria:    The patient:  1. Has received IV therapy for at least 48 hours   2. Has a functioning GI tract  - Taking scheduled oral medications  - Tolerating tube feeds at goal rate or a full liquid, soft, or regular diet         3. Is clinically stable        - Temperature < 100.4F for at least 24 hours        - WBC is trending down    This IV to PO conversion is based on the [employer] approved automatic conversion policy for eligible patients. Please call with questions. Patient will complete 7 days of therapy for HAP on 6/21 after 9pm dose. Current dose 750 mg q 48 hrs for crcl ~ 22.

## 2018-06-20 NOTE — PROGRESS NOTES
Physical Therapy Note     Chart reviewed and discussed with RN. Patient currently reporting not feeling well and pleasantly refusing to participate at this time however was agreeable to learning bed exercises and mobility plan for later today. PT educated patient on ankle pumps, heel slides, and SLR, patient performed 1x of each. Patient agreeable to transfer to chair for all meals as well. PT will follow up tomorrow as able and appropriate.     Clem Jackman PT, DPT

## 2018-06-21 ENCOUNTER — APPOINTMENT (OUTPATIENT)
Dept: GENERAL RADIOLOGY | Age: 83
DRG: 189 | End: 2018-06-21
Attending: INTERNAL MEDICINE
Payer: MEDICARE

## 2018-06-21 VITALS
RESPIRATION RATE: 16 BRPM | DIASTOLIC BLOOD PRESSURE: 78 MMHG | HEART RATE: 79 BPM | BODY MASS INDEX: 23.81 KG/M2 | HEIGHT: 61 IN | SYSTOLIC BLOOD PRESSURE: 142 MMHG | OXYGEN SATURATION: 99 % | WEIGHT: 126.1 LBS | TEMPERATURE: 98.3 F

## 2018-06-21 PROBLEM — N17.9 ACUTE RENAL FAILURE SUPERIMPOSED ON STAGE 3 CHRONIC KIDNEY DISEASE (HCC): Status: ACTIVE | Noted: 2018-06-21

## 2018-06-21 PROBLEM — J96.00 ACUTE RESPIRATORY FAILURE (HCC): Status: ACTIVE | Noted: 2018-06-21

## 2018-06-21 PROBLEM — I25.10 CAD (CORONARY ARTERY DISEASE): Status: ACTIVE | Noted: 2018-06-21

## 2018-06-21 PROBLEM — E87.70 FLUID OVERLOAD: Status: RESOLVED | Noted: 2018-06-01 | Resolved: 2018-06-18

## 2018-06-21 PROBLEM — N18.30 ACUTE RENAL FAILURE SUPERIMPOSED ON STAGE 3 CHRONIC KIDNEY DISEASE (HCC): Status: ACTIVE | Noted: 2018-06-21

## 2018-06-21 PROBLEM — E11.65 HYPERGLYCEMIA DUE TO TYPE 2 DIABETES MELLITUS (HCC): Status: ACTIVE | Noted: 2018-06-21

## 2018-06-21 PROBLEM — M10.9 GOUT FLARE: Status: ACTIVE | Noted: 2018-06-21

## 2018-06-21 PROBLEM — R77.8 ELEVATED TROPONIN: Status: ACTIVE | Noted: 2018-06-21

## 2018-06-21 LAB
ALBUMIN SERPL-MCNC: 2.4 G/DL (ref 3.5–5)
ALBUMIN/GLOB SERPL: 0.5 {RATIO} (ref 1.1–2.2)
ALP SERPL-CCNC: 374 U/L (ref 45–117)
ALT SERPL-CCNC: 16 U/L (ref 12–78)
ANION GAP SERPL CALC-SCNC: 12 MMOL/L (ref 5–15)
AST SERPL-CCNC: 17 U/L (ref 15–37)
BACTERIA SPEC CULT: ABNORMAL
BACTERIA SPEC CULT: ABNORMAL
BILIRUB SERPL-MCNC: 0.5 MG/DL (ref 0.2–1)
BUN SERPL-MCNC: 64 MG/DL (ref 6–20)
BUN/CREAT SERPL: 41 (ref 12–20)
CALCIUM SERPL-MCNC: 9.4 MG/DL (ref 8.5–10.1)
CHLORIDE SERPL-SCNC: 99 MMOL/L (ref 97–108)
CO2 SERPL-SCNC: 24 MMOL/L (ref 21–32)
CREAT SERPL-MCNC: 1.58 MG/DL (ref 0.55–1.02)
ERYTHROCYTE [DISTWIDTH] IN BLOOD BY AUTOMATED COUNT: 19.5 % (ref 11.5–14.5)
GLOBULIN SER CALC-MCNC: 4.4 G/DL (ref 2–4)
GLUCOSE BLD STRIP.AUTO-MCNC: 140 MG/DL (ref 65–100)
GLUCOSE BLD STRIP.AUTO-MCNC: 216 MG/DL (ref 65–100)
GLUCOSE BLD STRIP.AUTO-MCNC: 414 MG/DL (ref 65–100)
GLUCOSE BLD STRIP.AUTO-MCNC: 415 MG/DL (ref 65–100)
GLUCOSE SERPL-MCNC: 174 MG/DL (ref 65–100)
HCT VFR BLD AUTO: 29.7 % (ref 35–47)
HGB BLD-MCNC: 9.7 G/DL (ref 11.5–16)
MAGNESIUM SERPL-MCNC: 2 MG/DL (ref 1.6–2.4)
MCH RBC QN AUTO: 27.5 PG (ref 26–34)
MCHC RBC AUTO-ENTMCNC: 32.7 G/DL (ref 30–36.5)
MCV RBC AUTO: 84.1 FL (ref 80–99)
NRBC # BLD: 0 K/UL (ref 0–0.01)
NRBC BLD-RTO: 0 PER 100 WBC
PLATELET # BLD AUTO: 208 K/UL (ref 150–400)
PMV BLD AUTO: 10.1 FL (ref 8.9–12.9)
POTASSIUM SERPL-SCNC: 3.4 MMOL/L (ref 3.5–5.1)
PROT SERPL-MCNC: 6.8 G/DL (ref 6.4–8.2)
RBC # BLD AUTO: 3.53 M/UL (ref 3.8–5.2)
SERVICE CMNT-IMP: ABNORMAL
SODIUM SERPL-SCNC: 135 MMOL/L (ref 136–145)
WBC # BLD AUTO: 6.4 K/UL (ref 3.6–11)

## 2018-06-21 PROCEDURE — 86300 IMMUNOASSAY TUMOR CA 15-3: CPT | Performed by: INTERNAL MEDICINE

## 2018-06-21 PROCEDURE — 74011250636 HC RX REV CODE- 250/636: Performed by: INTERNAL MEDICINE

## 2018-06-21 PROCEDURE — 94660 CPAP INITIATION&MGMT: CPT

## 2018-06-21 PROCEDURE — 36415 COLL VENOUS BLD VENIPUNCTURE: CPT | Performed by: INTERNAL MEDICINE

## 2018-06-21 PROCEDURE — 85027 COMPLETE CBC AUTOMATED: CPT | Performed by: INTERNAL MEDICINE

## 2018-06-21 PROCEDURE — 77030011256 HC DRSG MEPILEX <16IN NO BORD MOLN -A

## 2018-06-21 PROCEDURE — 71046 X-RAY EXAM CHEST 2 VIEWS: CPT

## 2018-06-21 PROCEDURE — 74011250636 HC RX REV CODE- 250/636: Performed by: SPECIALIST

## 2018-06-21 PROCEDURE — 83735 ASSAY OF MAGNESIUM: CPT | Performed by: INTERNAL MEDICINE

## 2018-06-21 PROCEDURE — 74011636637 HC RX REV CODE- 636/637: Performed by: INTERNAL MEDICINE

## 2018-06-21 PROCEDURE — 82962 GLUCOSE BLOOD TEST: CPT

## 2018-06-21 PROCEDURE — 80053 COMPREHEN METABOLIC PANEL: CPT | Performed by: INTERNAL MEDICINE

## 2018-06-21 PROCEDURE — 74011250637 HC RX REV CODE- 250/637: Performed by: INTERNAL MEDICINE

## 2018-06-21 PROCEDURE — 74011250637 HC RX REV CODE- 250/637: Performed by: NURSE PRACTITIONER

## 2018-06-21 PROCEDURE — 74011000258 HC RX REV CODE- 258: Performed by: INTERNAL MEDICINE

## 2018-06-21 PROCEDURE — 74011250637 HC RX REV CODE- 250/637: Performed by: SPECIALIST

## 2018-06-21 PROCEDURE — 74011250637 HC RX REV CODE- 250/637: Performed by: FAMILY MEDICINE

## 2018-06-21 RX ORDER — PREDNISONE 5 MG/1
TABLET ORAL
Qty: 6 TAB | Refills: 0 | Status: SHIPPED
Start: 2018-06-21 | End: 2018-07-02

## 2018-06-21 RX ORDER — ACETAMINOPHEN 325 MG/1
650 TABLET ORAL
Qty: 10 TAB | Refills: 0 | Status: ON HOLD | OUTPATIENT
Start: 2018-06-21 | End: 2020-01-01

## 2018-06-21 RX ORDER — LEVOFLOXACIN 750 MG/1
750 TABLET ORAL
Qty: 1 TAB | Refills: 0 | Status: SHIPPED | OUTPATIENT
Start: 2018-06-21 | End: 2018-07-02

## 2018-06-21 RX ORDER — FUROSEMIDE 80 MG/1
80 TABLET ORAL DAILY
Qty: 30 TAB | Refills: 0 | Status: SHIPPED
Start: 2018-06-21 | End: 2018-07-02

## 2018-06-21 RX ORDER — AMOXICILLIN AND CLAVULANATE POTASSIUM 500; 125 MG/1; MG/1
1 TABLET, FILM COATED ORAL EVERY 12 HOURS
Qty: 10 TAB | Refills: 0 | Status: SHIPPED
Start: 2018-06-21 | End: 2018-07-02

## 2018-06-21 RX ORDER — B-COMPLEX WITH VITAMIN C
1 TABLET ORAL DAILY
Qty: 30 TAB | Refills: 0 | Status: SHIPPED
Start: 2018-06-22 | End: 2020-01-01

## 2018-06-21 RX ORDER — POTASSIUM CHLORIDE 750 MG/1
20 TABLET, FILM COATED, EXTENDED RELEASE ORAL
Status: COMPLETED | OUTPATIENT
Start: 2018-06-21 | End: 2018-06-21

## 2018-06-21 RX ORDER — INSULIN LISPRO 100 [IU]/ML
10 INJECTION, SOLUTION INTRAVENOUS; SUBCUTANEOUS ONCE
Status: COMPLETED | OUTPATIENT
Start: 2018-06-21 | End: 2018-06-21

## 2018-06-21 RX ORDER — INSULIN GLARGINE 100 [IU]/ML
10 INJECTION, SOLUTION SUBCUTANEOUS DAILY
Qty: 1 VIAL | Refills: 0 | Status: SHIPPED
Start: 2018-06-21 | End: 2018-10-19 | Stop reason: ALTCHOICE

## 2018-06-21 RX ORDER — SIMETHICONE 80 MG
80 TABLET,CHEWABLE ORAL AS NEEDED
Qty: 10 TAB | Refills: 0 | Status: SHIPPED | OUTPATIENT
Start: 2018-06-21 | End: 2019-02-18

## 2018-06-21 RX ADMIN — CHOLECALCIFEROL TAB 10 MCG (400 UNIT) 1000 UNITS: 10 TAB at 08:07

## 2018-06-21 RX ADMIN — FOLIC ACID 1 MG: 1 TABLET ORAL at 08:06

## 2018-06-21 RX ADMIN — FUROSEMIDE 80 MG: 40 TABLET ORAL at 08:07

## 2018-06-21 RX ADMIN — OXYCODONE HYDROCHLORIDE AND ACETAMINOPHEN 1000 MG: 500 TABLET ORAL at 08:07

## 2018-06-21 RX ADMIN — HEPARIN SODIUM 5000 UNITS: 5000 INJECTION, SOLUTION INTRAVENOUS; SUBCUTANEOUS at 08:08

## 2018-06-21 RX ADMIN — Medication 1 TABLET: at 08:18

## 2018-06-21 RX ADMIN — Medication 1 CAPSULE: at 08:07

## 2018-06-21 RX ADMIN — CLOPIDOGREL BISULFATE 75 MG: 75 TABLET ORAL at 08:07

## 2018-06-21 RX ADMIN — PIPERACILLIN SODIUM AND TAZOBACTAM SODIUM 4.5 G: 4; .5 INJECTION, POWDER, LYOPHILIZED, FOR SOLUTION INTRAVENOUS at 12:26

## 2018-06-21 RX ADMIN — INSULIN LISPRO 3 UNITS: 100 INJECTION, SOLUTION INTRAVENOUS; SUBCUTANEOUS at 11:26

## 2018-06-21 RX ADMIN — ASPIRIN 81 MG: 81 TABLET, COATED ORAL at 08:07

## 2018-06-21 RX ADMIN — INSULIN LISPRO 10 UNITS: 100 INJECTION, SOLUTION INTRAVENOUS; SUBCUTANEOUS at 16:44

## 2018-06-21 RX ADMIN — CARVEDILOL 6.25 MG: 6.25 TABLET, FILM COATED ORAL at 16:44

## 2018-06-21 RX ADMIN — DOCUSATE SODIUM 100 MG: 100 CAPSULE, LIQUID FILLED ORAL at 08:06

## 2018-06-21 RX ADMIN — CARVEDILOL 6.25 MG: 6.25 TABLET, FILM COATED ORAL at 08:06

## 2018-06-21 RX ADMIN — ANASTROZOLE 1 MG: 1 TABLET, COATED ORAL at 08:15

## 2018-06-21 RX ADMIN — Medication 10 ML: at 06:00

## 2018-06-21 RX ADMIN — LEVOTHYROXINE SODIUM 88 MCG: 88 TABLET ORAL at 06:57

## 2018-06-21 RX ADMIN — POTASSIUM CHLORIDE 20 MEQ: 750 TABLET, EXTENDED RELEASE ORAL at 14:33

## 2018-06-21 RX ADMIN — PREDNISONE 20 MG: 20 TABLET ORAL at 08:07

## 2018-06-21 RX ADMIN — PIPERACILLIN SODIUM AND TAZOBACTAM SODIUM 4.5 G: 4; .5 INJECTION, POWDER, LYOPHILIZED, FOR SOLUTION INTRAVENOUS at 05:26

## 2018-06-21 RX ADMIN — Medication 10 ML: at 14:33

## 2018-06-21 RX ADMIN — INSULIN LISPRO 2 UNITS: 100 INJECTION, SOLUTION INTRAVENOUS; SUBCUTANEOUS at 06:57

## 2018-06-21 RX ADMIN — POLYETHYLENE GLYCOL 3350 17 G: 17 POWDER, FOR SOLUTION ORAL at 08:08

## 2018-06-21 RX ADMIN — DOCUSATE SODIUM 100 MG: 100 CAPSULE, LIQUID FILLED ORAL at 17:52

## 2018-06-21 NOTE — PROGRESS NOTES
Labs reviewed:    Renal function stable with creat 1.6.  K slightly low at 3.4, so KCl 20 mEq X 1 ordered. Dr. Kaitlin Loja will F/U tomorrow.

## 2018-06-21 NOTE — PROGRESS NOTES
0730 Bedside shift change report given to Michael (oncoming nurse) by Kendra Aguilar (offgoing nurse). Report included the following information SBAR, Intake/Output, MAR, Recent Results and Cardiac Rhythm NSR.  0830 Assessment completed. Patient comfortable with no complaints of Chest pain or SOB. 1127 Reassessment completed. No notable changes. 1500 Reassessment completed. No notable changes. 1600 attempted to call report to [facility]. Line is busy  [phone] Patient . Dr. Bear hartman. 10 units insulin Humalog ordered. [address] Telephone report called to Oliva at [facility]. Problem: Falls - Risk of  Goal: *Absence of Falls  Document Yaron Fall Risk and appropriate interventions in the flowsheet. Outcome: Progressing Towards Goal  Fall Risk Interventions:  Mobility Interventions: Communicate number of staff needed for ambulation/transfer, Patient to call before getting OOB, PT Consult for mobility concerns, Utilize walker, cane, or other assitive device    Medication Interventions: Teach patient to arise slowly    Elimination Interventions: Call light in reach, Elevated toilet seat, Toileting schedule/hourly rounds    History of Falls Interventions: Room close to nurse's station    Problem: Pressure Injury - Risk of  Goal: *Prevention of pressure injury  Document Calderon Scale and appropriate interventions in the flowsheet.      Offload heels  Turn approximately every 2 hours or remind patient to turn   Outcome: Progressing Towards Goal  Pressure Injury Interventions:  Sensory Interventions: Assess changes in LOC, Assess need for specialty bed, Avoid rigorous massage over bony prominences, Chair cushion, Check visual cues for pain, Discuss PT/OT consult with provider, Keep linens dry and wrinkle-free, Maintain/enhance activity level, Minimize linen layers, Monitor skin under medical devices, Pad between skin to skin, Pressure redistribution bed/mattress (bed type), Sit a 90-degree angle/use footstool if needed, Suspension boots, Turn and reposition approx. every two hours (pillows and wedges if needed), Use 30-degree side-lying position    Moisture Interventions: Absorbent underpads, Offer toileting Q_hr    Activity Interventions: Chair cushion, Pressure redistribution bed/mattress(bed type), PT/OT evaluation, Increase time out of bed    Mobility Interventions: Chair cushion, Float heels, HOB 30 degrees or less, PT/OT evaluation    Nutrition Interventions: Document food/fluid/supplement intake    Friction and Shear Interventions: Apply protective barrier, creams and emollients, Feet elevated on foot rest, Foam dressings/transparent film/skin sealants, Lift team/patient mobility team, Minimize layers, Transferring/repositioning devices    Problem: Heart Failure: Day 5  Goal: Off Pathway (Use only if patient is Off Pathway)  Outcome: Progressing Towards Goal  DAY 9: Patient hemodynamically stable, understands purpose of medications, and has increased activity and mobility.

## 2018-06-21 NOTE — PROGRESS NOTES
Spoke with [facility] this am and they want to be sure they receive Bipap today before patient arrives - agreed to have patient leave [facility] at 4 pm - son is aware and in agreement- son will make arrangements/appointment for follow-up OP sleep study for Bipap for home. Envelope on chart for medicals to go with patient and nursing to call report to [phone]. CM faxed copy of AVS and discharge summary to facility at [phone] and call placed to [facility] to confirm receipt.  Azra Maxwell, MSW

## 2018-06-21 NOTE — PROGRESS NOTES
0720 Bedside shift change report given to Domingo Herrera RN (oncoming nurse) by Naima Walker RN (offgoing nurse). Report included the following information SBAR, Intake/Output, Recent Results and Cardiac Rhythm NSR. Problem: Falls - Risk of  Goal: *Absence of Falls  Document Yaron Fall Risk and appropriate interventions in the flowsheet. Outcome: Progressing Towards Goal  Fall Risk Interventions:  Mobility Interventions: Communicate number of staff needed for ambulation/transfer, Patient to call before getting OOB, PT Consult for assist device competence, Utilize walker, cane, or other assitive device, Strengthening exercises (ROM-active/passive)    Medication Interventions: Teach patient to arise slowly    Elimination Interventions: Call light in reach, Toileting schedule/hourly rounds, Patient to call for help with toileting needs    History of Falls Interventions: Consult care management for discharge planning, Room close to nurse's station        Problem: Pressure Injury - Risk of  Goal: *Prevention of pressure injury  Document Calderon Scale and appropriate interventions in the flowsheet. Offload heels  Turn approximately every 2 hours or remind patient to turn   Outcome: Progressing Towards Goal  Pressure Injury Interventions:  Sensory Interventions: Assess changes in LOC, Assess need for specialty bed, Avoid rigorous massage over bony prominences, Chair cushion, Check visual cues for pain, Discuss PT/OT consult with provider, Keep linens dry and wrinkle-free, Maintain/enhance activity level, Minimize linen layers, Monitor skin under medical devices, Pad between skin to skin, Pressure redistribution bed/mattress (bed type), Sit a 90-degree angle/use footstool if needed, Suspension boots, Turn and reposition approx.  every two hours (pillows and wedges if needed), Use 30-degree side-lying position    Moisture Interventions: Apply protective barrier, creams and emollients, Absorbent underpads, Internal/External urinary devices, Limit adult briefs, Maintain skin hydration (lotion/cream)    Activity Interventions: Increase time out of bed, Pressure redistribution bed/mattress(bed type), PT/OT evaluation    Mobility Interventions: HOB 30 degrees or less, Pressure redistribution bed/mattress (bed type), PT/OT evaluation    Nutrition Interventions: Document food/fluid/supplement intake    Friction and Shear Interventions: Apply protective barrier, creams and emollients, Feet elevated on foot rest, Foam dressings/transparent film/skin sealants, Lift team/patient mobility team, Minimize layers, Transferring/repositioning devices

## 2018-06-21 NOTE — PROGRESS NOTES
0730 Bedside shift change report given to Michael (oncoming nurse) by Aftab Cervantes (offgoing nurse). Report included the following information SBAR, Intake/Output, MAR, Recent Results and Cardiac Rhythm NSR.  0830 Assessment completed. Patient comfortable with no complaints of Chest pain or SOB. 1127 Reassessment completed. No notable changes. 1500 Reassessment completed. No notable changes. 1600 attempted to call report to [facility]. Line is busy  [phone] Patient . Dr. Farida hartman. 10 units insulin Humalog ordered. [address] Telephone report called to [facility] at [facility]. 1815 . dischar      Problem: Falls - Risk of  Goal: *Absence of Falls  Document Yaron Fall Risk and appropriate interventions in the flowsheet. Outcome: Progressing Towards Goal  Fall Risk Interventions:  Mobility Interventions: Communicate number of staff needed for ambulation/transfer, Patient to call before getting OOB, PT Consult for mobility concerns, Utilize walker, cane, or other assitive device    Medication Interventions: Teach patient to arise slowly    Elimination Interventions: Call light in reach, Elevated toilet seat, Toileting schedule/hourly rounds    History of Falls Interventions: Room close to nurse's station    Problem: Pressure Injury - Risk of  Goal: *Prevention of pressure injury  Document Calderon Scale and appropriate interventions in the flowsheet.      Offload heels  Turn approximately every 2 hours or remind patient to turn   Outcome: Progressing Towards Goal  Pressure Injury Interventions:  Sensory Interventions: Assess changes in LOC, Assess need for specialty bed, Avoid rigorous massage over bony prominences, Chair cushion, Check visual cues for pain, Discuss PT/OT consult with provider, Keep linens dry and wrinkle-free, Maintain/enhance activity level, Minimize linen layers, Monitor skin under medical devices, Pad between skin to skin, Pressure redistribution bed/mattress (bed type), Sit a 90-degree angle/use footstool if needed, Suspension boots, Turn and reposition approx. every two hours (pillows and wedges if needed), Use 30-degree side-lying position    Moisture Interventions: Absorbent underpads, Offer toileting Q_hr    Activity Interventions: Chair cushion, Pressure redistribution bed/mattress(bed type), PT/OT evaluation, Increase time out of bed    Mobility Interventions: Chair cushion, Float heels, HOB 30 degrees or less, PT/OT evaluation    Nutrition Interventions: Document food/fluid/supplement intake    Friction and Shear Interventions: Apply protective barrier, creams and emollients, Feet elevated on foot rest, Foam dressings/transparent film/skin sealants, Lift team/patient mobility team, Minimize layers, Transferring/repositioning devices    Problem: Heart Failure: Day 5  Goal: Off Pathway (Use only if patient is Off Pathway)  Outcome: Progressing Towards Goal  DAY 9: Patient hemodynamically stable, understands purpose of medications, and has increased activity and mobility.

## 2018-06-21 NOTE — PROGRESS NOTES
-Hematology / Oncology ([facility]) -  -Primary Oncologist- Bryanna Borges  -CC- Breast CA    -S- Denies chest pain- GI or  bleeding  feels better following transfusion of PRBC on 6/19/2018  No sickness from cancer therapy  Son requests we check \"biomarker\" for breast cancer    -O-    ROS: pain n/v or apparent toxicity from palbociclib  Patient Vitals for the past 24 hrs:   Temp Pulse Resp BP SpO2   06/21/18 0801 98.1 °F (36.7 °C) 66 18 119/40 99 %   06/21/18 0330 97.7 °F (36.5 °C) 66 16 120/40 96 %   06/20/18 2300 97.9 °F (36.6 °C) 84 16 161/59 96 %   06/20/18 1917 97.7 °F (36.5 °C) 68 18 118/70 98 %   06/20/18 1513 98.2 °F (36.8 °C) 73 18 141/48 96 %   06/20/18 1423 97.6 °F (36.4 °C) 74 18 131/54 100 %   06/20/18 1201 97.6 °F (36.4 °C) 70 18 130/48 100 %   06/20/18 0950 - - - 144/80 -        Gen: nad    Chest: good BS  Cardiac: rrr no tachycardia  Abd: s/nt  Neuro: AAOx 3 non focal    -Labs-    Recent Labs      06/21/18   0340  06/20/18   0300  06/19/18   1208   WBC  6.4  6.7   --    HGB  9.7*  9.7*   --    PLT  208  209   --    ANEU   --   5.0   --    NA  135*  136  135*   K  3.4*  3.9  3.8   GLU  174*  266*  260*   BUN  64*  63*  60*   CREA  1.58*  1.68*  1.67*   ALT  16  17   --    SGOT  17  13*   --    TBILI  0.5  0.5   --    AP  374*  399*   --    CA  9.4  8.9  8.4*   MG  2.0  1.6   --    PHOS   --   3.8  3.7         -Assessment + Plan-     *)Met breast ca- ER+ - Known to Dr. Bryanna Borges. Prev h/o colon ca s/p chemotherapy many years ago. new breast cancer diagnosed   -Continue arimidex daily  -Cont Ibrance 125 mg po once daily - Initiated c1d1 6/11/2015   -will need CBC monitoring following discharge    -Expect neutropenia  -will get Ca 27.29 for assessment of breast cancer      *)CHF/CAD -Per Dr Maurisio Urban and Cardiology.     *)Dispo - will need SNF    *)Anemia --progressive/recurrent-- previous  PRBC 4/2018 -will get labs/monitor stools for blood-- she has had transfusion in the past. Risk /benefits of transfusion reviewed on 6/19/2018-- tolerated transfusion of PRBC 6/19/2018  Feels better with increased Hgb 6/20/2018      We reviewed f/u at [facility] with pt/son. Her son  will call office to arrange outpatient labs at [facility] next week.    She will f/u Dr. Prerna Goff at [facility] 6/2/2018 as scheduled

## 2018-06-21 NOTE — PROGRESS NOTES
Bedside and Verbal shift change report given to Fredi Vega RN (oncoming nurse) by Adrianna Ornelas RN (offgoing nurse).  Report included the following information SBAR, Kardex, ED Summary, OR Summary, Procedure Summary, Intake/Output, MAR, Accordion, Recent Results and Cardiac Rhythm SR.

## 2018-06-21 NOTE — DISCHARGE SUMMARY
Discharge Summary       PATIENT ID: Danielle Escalera  MRN: [id number]   YOB: 1935    DATE OF ADMISSION: 6/12/2018 12:24 AM    DATE OF DISCHARGE: 6/21/18   PRIMARY CARE PROVIDER: Delores Stevenson MD     DISCHARGING PHYSICIAN: Chantelle Petersen MD, S  To contact this individual call [phone] and ask the  to page. If unavailable ask to be transferred the Adult Hospitalist Department. CONSULTATIONS: IP CONSULT TO CARDIOLOGY  IP CONSULT TO ONCOLOGY  IP CONSULT TO INTENSIVIST  IP CONSULT TO NEPHROLOGY   PALLIATIVE MEDICINE    PROCEDURES/SURGERIES: * No surgery found *  1 unit PRBC transfusion 6/19    ADMITTING DIAGNOSES & HOSPITAL COURSE:   81 yo woman with CAD, chronic systolic heart failure, metastatic left breast CA, HTN, DM2, hypothyroidism, DLD, RA, and CKD Stage 3 was BIBEMS to the ED from [facility] on 6/12/18 with SOB and hypoxia. The patient has had 4 myocardial infarctions since April of this year, each episode presenting with shortness of breath. She has had multiple admissions to this hospital for evaluation and treatment of NSTEMI.  Patient was admitted with NSTEMI and acute on chronic systolic heart failure.     Acute recurrent respiratory failure (POA) - due to fluid overload  - still using BiPAP qhs and weaned off O2 NC  - [facility] signed off  - to have BiPAP at SNF and will be set up with outpatient appt for sleep study  - BiPAP settings: 12/6/40%     Elevated troponin with severe multivessel CAD (POA) - not NSTEMI per Cardiology and not a candidate for intervention  - likely due to fluid overload  - continue ASA, Plavix, BB, Repatha  - no statin due to h/o intolerance  - f/u with Dr Johns Labs     Acute on chronic systolic heart failure, NYHA Class 4 on admission (POA)  - currently NYHA Class 3  - TTE 6/18 EF 30%, no RWMA  - continue Lasix and BB  - no ACEi/ARB due to CKD     JEROD on CKD Stage 3 - due to ATN and diuretics  - Renal following  - continue current meds for now     Probable right wrist/hand gout flare - prednisone taper     Anemia of chronic illness - s/p PRBC transfusion ordered by Oncology  - continue heparin SC due to high risk of developing VTE     Metastatic left breast CA - Oncology following  - on Arimedex and Ibrance  - f/u with Dr Vikas Ordonez     HTN - controlled on current meds  - lisinopril stopped due to JEROD     DM2 with hyperglycemia - likely steroid-induced  - continue Lantus, SSI     H/o colon CA     L pleural effusion with possible HCAP,  - blood culture  CoNS  bottles  -  blood culture pending  - levofloxacin and Zosyn started ; will d/c with renally-dosed Augmentin x7 days    DISCHARGE DIAGNOSES / PLAN:      Stable for discharge to [facility] with BiPAP to be set up there. Follow up with PCP, Oncology, Cardiology, Nephrology       PENDING TEST RESULTS:   At the time of discharge the following test results are still pendin/16 and  blood cultures    FOLLOW UP APPOINTMENTS:    Follow-up Information     Follow up With Details Comments Nathan [address] [id number]  [phone]      Shivani Cooper MD In 1 week hospital follow up [address] [facility] [address] [facility] [address]      Parrish Lawler MD  Oncology; call for follow up appointment [address] [facility]  Mary Torres MD  Cardiology; call for follow up appointment [place] [address]  [address] [facility] [address] [facility]      Marita Valiente MD  Nephrology; call for follow up appointment Yinka Tanner [address] [place] [address] [phone]             ADDITIONAL CARE RECOMMENDATIONS:   1. Take medications as prescribed. 2. Keep appointment(s) as recommended/scheduled. 3. Fluid restriction 1000ml/day  4. BiPAP qhs with settings IPAP 12 EPAP 6 FiO2 40%. 5. Patient has been set up with outpatient sleep study at [facility] on  or .  Please make sure pt goes for the study. 6. Accuechecks qACHS with below insulin lispro correction scale:    INITIATE CORRECTIVE INSULIN PROTOCOL (ARINA):   RX ARINA Normal Sensitivity (Average weight)   AC (before meals), Q6H, and Q4H CORRECTIONAL SCALE only For Blood Sugar (mg/dl) of :              140-199=2 units             200-249=3 units   250-299=5 units   300-349=7 units   350 or greater = Call MD   Give in addition to basal medications. Do Not Hold for NPO   BEDTIME CORRECTIONAL sliding scale when scheduled:   200-249=2 units   250-299=3 units    300-349=4 units   350 or greater = Call MD   Give in addition to basal medications. Do Not Hold for NPO   Fast Acting - Administer Immediately - or within 15 minutes of start of meal, if mealtime coverage. DIET: Cardiac and Diabetic Diet with 1000ml/day fluid restriction    ACTIVITY: PT/OT Eval and Treat    WOUND CARE: none    EQUIPMENT needed: BiPAP with settings 12/6/40%      DISCHARGE MEDICATIONS:  Current Discharge Medication List      START taking these medications    Details   acetaminophen (TYLENOL) 325 mg tablet Take 2 Tabs by mouth every four (4) hours as needed. Qty: 10 Tab, Refills: 0      b-complex with vitamin c tablet Take 1 Tab by mouth daily. Qty: 30 Tab, Refills: 0      insulin glargine (LANTUS) 100 unit/mL injection 10 Units by SubCUTAneous route daily. Indications: type 2 diabetes mellitus  Qty: 1 Vial, Refills: 0      levoFLOXacin (LEVAQUIN) 750 mg tablet Take 1 Tab by mouth every fourty-eight (48) hours. Take 1 dose 6/21/18 at 2100  Qty: 1 Tab, Refills: 0      methyl salicylate-menthol (BENGAY) 15-10 % topical cream Apply  to affected area as needed for Pain. APPLY TO right shoulder    Nursing, document site in comments  Qty: 1 Tube, Refills: 0      palbociclib (IBRANCE) 125 mg cap Take 1 Cap by mouth daily (with lunch).  Indications: HORMONE RECEPTOR(+), HER2(-) ADVANCED BREAST CANCER  Qty: 30 Cap, Refills: 0      predniSONE (DELTASONE) 5 mg tablet Take 10mg daily x2 days then 5mg daily x2 days  Qty: 6 Tab, Refills: 0      simethicone (MYLICON) 80 mg chewable tablet Take 1 Tab by mouth as needed. Indications: FLATULENCE  Qty: 10 Tab, Refills: 0      amoxicillin-clavulanate (AUGMENTIN) 500-125 mg per tablet Take 1 Tab by mouth every twelve (12) hours. Qty: 10 Tab, Refills: 0         CONTINUE these medications which have CHANGED    Details   furosemide (LASIX) 80 mg tablet Take 1 Tab by mouth daily. Indications: Pulmonary Edema due to Chronic Heart Failure  Qty: 30 Tab, Refills: 0         CONTINUE these medications which have NOT CHANGED    Details   FORTEO 20 mcg/dose - 600 mcg/2.4 mL pnij injection INJECT 20MCG ONCE DAILY  Qty: 2.4 mL, Refills: 0      evolocumab (REPATHA SURECLICK) pen injection 1 mL by SubCUTAneous route every fourteen (14) days. Qty: 2 Pen, Refills: 11    Associated Diagnoses: Other hyperlipidemia      nitroglycerin (NITROSTAT) 0.4 mg SL tablet 1 Tab by SubLINGual route as needed for Chest Pain. Up to 3 doses. Qty: 40 Tab, Refills: 0      clopidogrel (PLAVIX) 75 mg tab Take 1 Tab by mouth daily. Qty: 60 Tab, Refills: 0      carvedilol (COREG) 6.25 mg tablet Take 1 Tab by mouth two (2) times daily (with meals). Qty: 90 Tab, Refills: 0      anastrozole (ARIMIDEX) 1 mg tablet Take 1 Tab by mouth daily. Qty: 90 Tab, Refills: 0      docusate sodium (COLACE) 100 mg capsule Take 1 Cap by mouth two (2) times a day for 90 days. Qty: 60 Cap, Refills: 2      L. acidoph & paracasei- S therm- Bifido (BERYL-Q/RISAQUAD) 8 billion cell cap cap Take 1 Cap by mouth daily. Qty: 90 Cap, Refills: 0      polyethylene glycol (MIRALAX) 17 gram packet Take 1 Packet by mouth daily. Qty: 1 Each, Refills: 0      epoetin celio (EPOGEN;PROCRIT) 10,000 unit/mL injection 1 mL by SubCUTAneous route every Monday, Wednesday, Friday. Indications: ANEMIA DUE TO RENAL FAILURE  Qty: 1 Vial, Refills: 0      glimepiride (AMARYL) 1 mg tablet Take 1 mg by mouth every morning. levothyroxine (SYNTHROID) 88 mcg tablet Take 88 mcg by mouth Daily (before breakfast). etanercept (ENBREL) 50 mg/mL (0.98 mL) injection 1 mL by SubCUTAneous route every seven (7) days. Qty: 4 Syringe, Refills: 6      folic acid (FOLVITE) 1 mg tablet TAKE ONE TABLET BY MOUTH DAILY  Qty: 90 Tab, Refills: 2    Associated Diagnoses: Seropositive rheumatoid arthritis of multiple sites (HCC)      VIT C/VIT E/LUTEIN/MIN/OMEGA-3 (OCUVITE PO) Take 1 Tab by mouth daily. MULTIVITAMIN WITH MINERALS (HAIR,SKIN & NAILS PO) Take 1 Tab by mouth daily. aspirin delayed-release 81 mg tablet Take 81 mg by mouth daily. OMEGA-3 FATTY ACIDS/FISH OIL (OMEGA 3 FISH OIL PO) Take 300 mg by mouth daily. ascorbate calcium (MAKAYLA-C) 500 mg Tab Take 1,000 mg by mouth daily. CHOLECALCIFEROL, VITAMIN D3, (VITAMIN D-3 PO) Take 1,000 Units by mouth daily. STOP taking these medications       phosphorus (K PHOS NEUTRAL) 250 mg tablet Comments:   Reason for Stopping:         lisinopril (PRINIVIL, ZESTRIL) 2.5 mg tablet Comments:   Reason for Stopping:         sitaGLIPtin (JANUVIA) 25 mg tablet Comments:   Reason for Stopping:         MAGNESIUM MALATE Comments:   Reason for Stopping:             NOTIFY YOUR PHYSICIAN FOR ANY OF THE FOLLOWING:   Fever over 101 degrees for 24 hours. Chest pain, shortness of breath, fever, chills, nausea, vomiting, diarrhea, change in mentation, falling, weakness, bleeding. Severe pain or pain not relieved by medications. Or, any other signs or symptoms that you may have questions about.     DISPOSITION:    Home With:   OT  PT  HH  RN      x [facility]    Independent/assisted living    Hospice    Other:       PATIENT CONDITION AT DISCHARGE:     Functional status    Poor    x Deconditioned     Independent      Cognition   x  Lucid     Forgetful     Dementia      Catheters/lines (plus indication)    Alford     PICC     PEG    x None      Code status     Full code    x DNR PHYSICAL EXAMINATION AT DISCHARGE:  Visit Vitals    /40 (BP 1 Location: Right arm, BP Patient Position: At rest)    Pulse 66    Temp 98.1 °F (36.7 °C)    Resp 18    Ht 5' 1\" (1.549 m)    Wt 57.2 kg (126 lb 1.7 oz)    SpO2 99%    BMI 23.83 kg/m2     Constitutional:  awake, no acute distress, cooperative, pleasant   ENT:  oral mucosa moist, oropharynx benign    Resp:  few rales, no wheezing   CV:  regular rhythm, normal rate, no m/r/g appreciated, no peripheral edema    GI:  +BS, soft, non distended, non tender     Musculoskeletal:  moves all extremities    Neurologic:  AAOx3, NFD     Labs  Recent Results (from the past 24 hour(s))   GLUCOSE, POC    Collection Time: 06/20/18 11:30 AM   Result Value Ref Range    Glucose (POC) 211 (H) 65 - 100 mg/dL    Performed by [address], BLOOD, PAIRED    Collection Time: 06/20/18  2:35 PM   Result Value Ref Range    Special Requests: NO SPECIAL REQUESTS      Culture result: NO GROWTH AFTER 19 HOURS     GLUCOSE, POC    Collection Time: 06/20/18  4:26 PM   Result Value Ref Range    Glucose (POC) 253 (H) 65 - 100 mg/dL    Performed by Alessia Gallegos    GLUCOSE, POC    Collection Time: 06/20/18  9:39 PM   Result Value Ref Range    Glucose (POC) 286 (H) 65 - 100 mg/dL    Performed by Gary Meyers    CBC W/O DIFF    Collection Time: 06/21/18  3:40 AM   Result Value Ref Range    WBC 6.4 3.6 - 11.0 K/uL    RBC 3.53 (L) 3.80 - 5.20 M/uL    HGB 9.7 (L) 11.5 - 16.0 g/dL    HCT 29.7 (L) 35.0 - 47.0 %    MCV 84.1 80.0 - 99.0 FL    MCH 27.5 26.0 - 34.0 PG    MCHC 32.7 30.0 - 36.5 g/dL    RDW 19.5 (H) 11.5 - 14.5 %    PLATELET 212 283 - 317 K/uL    MPV 10.1 8.9 - 12.9 FL    NRBC 0.0 0  WBC    ABSOLUTE NRBC 0.00 0.00 - 0.63 K/uL   METABOLIC PANEL, COMPREHENSIVE    Collection Time: 06/21/18  3:40 AM   Result Value Ref Range    Sodium 135 (L) 136 - 145 mmol/L    Potassium 3.4 (L) 3.5 - 5.1 mmol/L    Chloride 99 97 - 108 mmol/L    CO2 24 21 - 32 mmol/L    Anion gap 12 5 - 15 mmol/L    Glucose 174 (H) 65 - 100 mg/dL    BUN 64 (H) 6 - 20 MG/DL    Creatinine 1.58 (H) 0.55 - 1.02 MG/DL    BUN/Creatinine ratio 41 (H) 12 - 20      GFR est AA 38 (L) >60 ml/min/1.73m2    GFR est non-AA 31 (L) >60 ml/min/1.73m2    Calcium 9.4 8.5 - 10.1 MG/DL    Bilirubin, total 0.5 0.2 - 1.0 MG/DL    ALT (SGPT) 16 12 - 78 U/L    AST (SGOT) 17 15 - 37 U/L    Alk.  phosphatase 374 (H) 45 - 117 U/L    Protein, total 6.8 6.4 - 8.2 g/dL    Albumin 2.4 (L) 3.5 - 5.0 g/dL    Globulin 4.4 (H) 2.0 - 4.0 g/dL    A-G Ratio 0.5 (L) 1.1 - 2.2     MAGNESIUM    Collection Time: 06/21/18  3:40 AM   Result Value Ref Range    Magnesium 2.0 1.6 - 2.4 mg/dL   GLUCOSE, POC    Collection Time: 06/21/18  6:39 AM   Result Value Ref Range    Glucose (POC) 140 (H) 65 - 100 mg/dL    Performed by [address]:  Problem List as of 6/21/2018  Date Reviewed: 6/21/2018          Codes Class Noted - Resolved    Acute respiratory failure ([facility] ICD-10-CM: J96.00  ICD-9-CM: 518.81  6/21/2018 - Present        Elevated troponin ICD-10-CM: R74.8  ICD-9-CM: 790.6  6/21/2018 - Present        CAD (coronary artery disease) ICD-10-CM: I25.10  ICD-9-CM: 414.00  6/21/2018 - Present        Acute renal failure superimposed on stage 3 chronic kidney disease ([facility] ICD-10-CM: N17.9, N18.3  ICD-9-CM: 584.9, 585.3  6/21/2018 - Present        Gout flare ICD-10-CM: M10.9  ICD-9-CM: 274.01  6/21/2018 - Present        Hyperglycemia due to type 2 diabetes mellitus ([facility] ICD-10-CM: E11.65  ICD-9-CM: 250.00  6/21/2018 - Present        Metastatic breast cancer ([facility] ICD-10-CM: C50.919  ICD-9-CM: 174.9  6/1/2018 - Present        Goals of care, counseling/discussion ICD-10-CM: Z71.89  ICD-9-CM: V65.49  6/1/2018 - Present        Acute on chronic systolic HF (heart failure) (HCC) ICD-10-CM: I50.23  ICD-9-CM: 428.23  5/19/2018 - Present        Acute systolic heart failure ([facility] ICD-10-CM: I50.21  ICD-9-CM: 428.21  4/24/2018 - Present Altered mental status, unspecified ICD-10-CM: R41.82  ICD-9-CM: 780.97  4/23/2018 - Present        SOB (shortness of breath) ICD-10-CM: R06.02  ICD-9-CM: 786.05  4/16/2018 - Present        CKD (chronic kidney disease) stage 3, GFR 30-59 ml/min ICD-10-CM: N18.3  ICD-9-CM: 585.3  10/25/2017 - Present        Vitamin D deficiency ICD-10-CM: E55.9  ICD-9-CM: 268.9  6/16/2017 - Present        Asymptomatic hyperuricemia ICD-10-CM: E79.0  ICD-9-CM: 790.6  2/16/2017 - Present        Statin intolerance ICD-10-CM: Z78.9  ICD-9-CM: 995.27  12/21/2016 - Present        Long-term use of immunosuppressant medication ICD-10-CM: Z79.899  ICD-9-CM: V58.69  11/21/2016 - Present        Seropositive rheumatoid arthritis of multiple sites [facility]) ICD-10-CM: M05.79  ICD-9-CM: 714.0  9/28/2016 - Present        Primary osteoarthritis of both knees ICD-10-CM: M17.0  ICD-9-CM: 715.16  9/28/2016 - Present        Occlusion and stenosis of carotid artery without mention of cerebral infarction ICD-10-CM: I65.29  ICD-9-CM: 433.10  8/23/2013 - Present        Weakness due to cerebrovascular accident ICD-10-CM: [id number]  ICD-9-CM: Zaid Caban  4/2/2012 - Present        Neuropathy in diabetes [facility]) ICD-10-CM: E11.40  ICD-9-CM: 250.60, 357.2  4/2/2012 - Present        PAD (peripheral artery disease) ([facility] ICD-10-CM: I73.9  ICD-9-CM: 443.9  9/7/2011 - Present        Carotid bruit ICD-10-CM: R09.89  ICD-9-CM: 785.9  8/31/2011 - Present        Claudication ([facility] ICD-10-CM: I73.9  ICD-9-CM: 443.9  8/31/2011 - Present        Weakness of left arm ICD-10-CM: R29.898  ICD-9-CM: 729.89  8/31/2011 - Present        Hyperlipidemia ICD-10-CM: E78.5  ICD-9-CM: 272.4  1/27/2010 - Present        DM (diabetes mellitus) ([facility] ICD-10-CM: E11.9  ICD-9-CM: 250.00  9/2/2009 - Present        Hypothyroid ICD-10-CM: E03.9  ICD-9-CM: 244.9  9/2/2009 - Present        Colon cancer ([facility] ICD-10-CM: C18.9  ICD-9-CM: 153.9  9/2/2009 - Present        H/O: CVA ICD-9-CM: V12.54 9/2/2009 - Present        Microalbuminuria ICD-10-CM: R80.9  ICD-9-CM: 791.0  9/2/2009 - Present        Age-related osteoporosis without current pathological fracture ICD-10-CM: M81.0  ICD-9-CM: 733.01  9/2/2009 - Present        Essential hypertension ICD-10-CM: I10  ICD-9-CM: 401.9  9/2/2009 - Present        Anemia ICD-10-CM: D64.9  ICD-9-CM: 285.9  9/2/2009 - Present        * (Principal)RESOLVED: Fluid overload ICD-10-CM: E87.70  ICD-9-CM: 276.69  6/1/2018 - 6/18/2018              Greater than 30 minutes were spent with the patient on counseling and coordination of care.     Signed:   Riaz Hunter MD  6/21/2018  10:30 AM

## 2018-06-21 NOTE — DISCHARGE INSTRUCTIONS
ADDITIONAL CARE RECOMMENDATIONS:   1. Take medications as prescribed. 2. Keep appointment(s) as recommended/scheduled. 3. Fluid restriction 1000ml/day  4. BiPAP qhs with settings IPAP 12 EPAP 6 FiO2 40%. 5. Patient has been set up with outpatient sleep study at [facility] on 7/5 or 7/8. Please make sure pt goes for the study. 6. Accuechecks qACHS with below insulin lispro correction scale:    INITIATE CORRECTIVE INSULIN PROTOCOL (ARINA):   RX ARINA Normal Sensitivity (Average weight)   AC (before meals), Q6H, and Q4H CORRECTIONAL SCALE only For Blood Sugar (mg/dl) of :              140-199=2 units             200-249=3 units   250-299=5 units   300-349=7 units   350 or greater = Call MD   Give in addition to basal medications. Do Not Hold for NPO   BEDTIME CORRECTIONAL sliding scale when scheduled:   200-249=2 units   250-299=3 units    300-349=4 units   350 or greater = Call MD   Give in addition to basal medications. Do Not Hold for NPO   Fast Acting - Administer Immediately - or within 15 minutes of start of meal, if mealtime coverage. DIET: Cardiac and Diabetic Diet with 1000ml/day fluid restriction    ACTIVITY: PT/OT Eval and Treat    WOUND CARE: none    EQUIPMENT needed:  BiPAP with settings 12/6/40%

## 2018-06-22 LAB — CANCER AG27-29 SERPL-ACNC: 331 U/ML (ref 0–38.6)

## 2018-06-24 RX ORDER — TERIPARATIDE 250 UG/ML
INJECTION, SOLUTION SUBCUTANEOUS
Qty: 2.4 ML | Refills: 0 | Status: SHIPPED | OUTPATIENT
Start: 2018-06-24 | End: 2018-07-06

## 2018-06-25 LAB
BACTERIA SPEC CULT: NORMAL
SERVICE CMNT-IMP: NORMAL

## 2018-06-29 ENCOUNTER — PATIENT OUTREACH (OUTPATIENT)
Dept: CARDIOLOGY CLINIC | Age: 83
End: 2018-06-29

## 2018-07-02 ENCOUNTER — HOSPITAL ENCOUNTER (INPATIENT)
Age: 83
LOS: 4 days | Discharge: NURSING, REHAB OR LONG-TERM CARE | DRG: 291 | End: 2018-07-06
Attending: EMERGENCY MEDICINE | Admitting: FAMILY MEDICINE
Payer: MEDICARE

## 2018-07-02 ENCOUNTER — APPOINTMENT (OUTPATIENT)
Dept: GENERAL RADIOLOGY | Age: 83
DRG: 291 | End: 2018-07-02
Attending: EMERGENCY MEDICINE
Payer: MEDICARE

## 2018-07-02 ENCOUNTER — APPOINTMENT (OUTPATIENT)
Dept: CT IMAGING | Age: 83
DRG: 291 | End: 2018-07-02
Attending: FAMILY MEDICINE
Payer: MEDICARE

## 2018-07-02 DIAGNOSIS — J96.01 ACUTE RESPIRATORY FAILURE WITH HYPOXIA AND HYPERCAPNIA (HCC): ICD-10-CM

## 2018-07-02 DIAGNOSIS — J81.0 ACUTE PULMONARY EDEMA (HCC): ICD-10-CM

## 2018-07-02 DIAGNOSIS — J96.02 ACUTE RESPIRATORY FAILURE WITH HYPOXIA AND HYPERCAPNIA (HCC): ICD-10-CM

## 2018-07-02 DIAGNOSIS — R09.02 HYPOXIA: Primary | ICD-10-CM

## 2018-07-02 LAB
ALBUMIN SERPL-MCNC: 2.8 G/DL (ref 3.5–5)
ALBUMIN/GLOB SERPL: 0.5 {RATIO} (ref 1.1–2.2)
ALP SERPL-CCNC: 747 U/L (ref 45–117)
ALT SERPL-CCNC: 21 U/L (ref 12–78)
ANION GAP SERPL CALC-SCNC: 12 MMOL/L (ref 5–15)
APPEARANCE UR: CLEAR
ARTERIAL PATENCY WRIST A: ABNORMAL
AST SERPL-CCNC: 29 U/L (ref 15–37)
ATRIAL RATE: 140 BPM
BACTERIA URNS QL MICRO: NEGATIVE /HPF
BASE DEFICIT BLD-SCNC: 5 MMOL/L
BASOPHILS # BLD: 0 K/UL (ref 0–0.1)
BASOPHILS NFR BLD: 1 % (ref 0–1)
BDY SITE: ABNORMAL
BILIRUB SERPL-MCNC: 0.5 MG/DL (ref 0.2–1)
BILIRUB UR QL: NEGATIVE
BUN SERPL-MCNC: 18 MG/DL (ref 6–20)
BUN/CREAT SERPL: 14 (ref 12–20)
CALCIUM SERPL-MCNC: 8.9 MG/DL (ref 8.5–10.1)
CALCULATED P AXIS, ECG09: 49 DEGREES
CALCULATED R AXIS, ECG10: 28 DEGREES
CALCULATED T AXIS, ECG11: 132 DEGREES
CHLORIDE SERPL-SCNC: 106 MMOL/L (ref 97–108)
CO2 SERPL-SCNC: 19 MMOL/L (ref 21–32)
COLOR UR: ABNORMAL
CREAT SERPL-MCNC: 1.31 MG/DL (ref 0.55–1.02)
DIAGNOSIS, 93000: NORMAL
DIFFERENTIAL METHOD BLD: ABNORMAL
EOSINOPHIL # BLD: 0 K/UL (ref 0–0.4)
EOSINOPHIL NFR BLD: 1 % (ref 0–7)
EPITH CASTS URNS QL MICRO: ABNORMAL /LPF
ERYTHROCYTE [DISTWIDTH] IN BLOOD BY AUTOMATED COUNT: 22.8 % (ref 11.5–14.5)
EST. AVERAGE GLUCOSE BLD GHB EST-MCNC: 183 MG/DL
GAS FLOW.O2 O2 DELIVERY SYS: ABNORMAL L/MIN
GLOBULIN SER CALC-MCNC: 5.6 G/DL (ref 2–4)
GLUCOSE BLD STRIP.AUTO-MCNC: 179 MG/DL (ref 65–100)
GLUCOSE BLD STRIP.AUTO-MCNC: 268 MG/DL (ref 65–100)
GLUCOSE BLD STRIP.AUTO-MCNC: 99 MG/DL (ref 65–100)
GLUCOSE SERPL-MCNC: 362 MG/DL (ref 65–100)
GLUCOSE UR STRIP.AUTO-MCNC: NEGATIVE MG/DL
HBA1C MFR BLD: 8 % (ref 4.2–6.3)
HCO3 BLD-SCNC: 22.6 MMOL/L (ref 22–26)
HCT VFR BLD AUTO: 43.3 % (ref 35–47)
HGB BLD-MCNC: 13.1 G/DL (ref 11.5–16)
HGB UR QL STRIP: ABNORMAL
HYALINE CASTS URNS QL MICRO: ABNORMAL /LPF (ref 0–5)
IMM GRANULOCYTES # BLD: 0 K/UL
IMM GRANULOCYTES NFR BLD AUTO: 0 %
KETONES UR QL STRIP.AUTO: NEGATIVE MG/DL
LACTATE SERPL-SCNC: 1.4 MMOL/L (ref 0.4–2)
LACTATE SERPL-SCNC: 1.6 MMOL/L (ref 0.4–2)
LACTATE SERPL-SCNC: 3.1 MMOL/L (ref 0.4–2)
LEUKOCYTE ESTERASE UR QL STRIP.AUTO: ABNORMAL
LYMPHOCYTES # BLD: 1.9 K/UL (ref 0.8–3.5)
LYMPHOCYTES NFR BLD: 50 % (ref 12–49)
MCH RBC QN AUTO: 28.2 PG (ref 26–34)
MCHC RBC AUTO-ENTMCNC: 30.3 G/DL (ref 30–36.5)
MCV RBC AUTO: 93.1 FL (ref 80–99)
MONOCYTES # BLD: 0.2 K/UL (ref 0–1)
MONOCYTES NFR BLD: 7 % (ref 5–13)
NEUTS SEG # BLD: 1.4 K/UL (ref 1.8–8)
NEUTS SEG NFR BLD: 41 % (ref 32–75)
NITRITE UR QL STRIP.AUTO: NEGATIVE
NRBC # BLD: 0 K/UL (ref 0–0.01)
NRBC BLD-RTO: 0 PER 100 WBC
O2/TOTAL GAS SETTING VFR VENT: 50 %
P-R INTERVAL, ECG05: 138 MS
PCO2 BLD: 52.3 MMHG (ref 35–45)
PEEP RESPIRATORY: 10 CMH2O
PH BLD: 7.24 [PH] (ref 7.35–7.45)
PH UR STRIP: 6 [PH] (ref 5–8)
PLATELET # BLD AUTO: 124 K/UL (ref 150–400)
PLATELET COMMENTS,PCOM: ABNORMAL
PMV BLD AUTO: 10.5 FL (ref 8.9–12.9)
PO2 BLD: 109 MMHG (ref 80–100)
POTASSIUM SERPL-SCNC: 4 MMOL/L (ref 3.5–5.1)
PRESSURE SUPPORT SETTING VENT: 8 CMH2O
PROT SERPL-MCNC: 8.4 G/DL (ref 6.4–8.2)
PROT UR STRIP-MCNC: 30 MG/DL
Q-T INTERVAL, ECG07: 302 MS
QRS DURATION, ECG06: 98 MS
QTC CALCULATION (BEZET), ECG08: 461 MS
RBC # BLD AUTO: 4.65 M/UL (ref 3.8–5.2)
RBC #/AREA URNS HPF: ABNORMAL /HPF (ref 0–5)
RBC MORPH BLD: ABNORMAL
RBC MORPH BLD: ABNORMAL
SAO2 % BLD: 97 % (ref 92–97)
SERVICE CMNT-IMP: ABNORMAL
SERVICE CMNT-IMP: ABNORMAL
SERVICE CMNT-IMP: NORMAL
SODIUM SERPL-SCNC: 137 MMOL/L (ref 136–145)
SP GR UR REFRACTOMETRY: 1.01 (ref 1–1.03)
SPECIMEN TYPE: ABNORMAL
TOTAL RESP. RATE, ITRR: 30
TROPONIN I SERPL-MCNC: 0.11 NG/ML
TROPONIN I SERPL-MCNC: 0.23 NG/ML
TROPONIN I SERPL-MCNC: <0.05 NG/ML
UROBILINOGEN UR QL STRIP.AUTO: 0.2 EU/DL (ref 0.2–1)
VENTRICULAR RATE, ECG03: 140 BPM
WBC # BLD AUTO: 3.5 K/UL (ref 3.6–11)
WBC URNS QL MICRO: ABNORMAL /HPF (ref 0–4)

## 2018-07-02 PROCEDURE — 74011250636 HC RX REV CODE- 250/636: Performed by: NURSE PRACTITIONER

## 2018-07-02 PROCEDURE — 84484 ASSAY OF TROPONIN QUANT: CPT | Performed by: EMERGENCY MEDICINE

## 2018-07-02 PROCEDURE — 36415 COLL VENOUS BLD VENIPUNCTURE: CPT | Performed by: EMERGENCY MEDICINE

## 2018-07-02 PROCEDURE — 94762 N-INVAS EAR/PLS OXIMTRY CONT: CPT

## 2018-07-02 PROCEDURE — 36600 WITHDRAWAL OF ARTERIAL BLOOD: CPT

## 2018-07-02 PROCEDURE — 82962 GLUCOSE BLOOD TEST: CPT

## 2018-07-02 PROCEDURE — 71250 CT THORAX DX C-: CPT

## 2018-07-02 PROCEDURE — 83605 ASSAY OF LACTIC ACID: CPT | Performed by: EMERGENCY MEDICINE

## 2018-07-02 PROCEDURE — 65660000001 HC RM ICU INTERMED STEPDOWN

## 2018-07-02 PROCEDURE — 80053 COMPREHEN METABOLIC PANEL: CPT | Performed by: EMERGENCY MEDICINE

## 2018-07-02 PROCEDURE — 74011636637 HC RX REV CODE- 636/637: Performed by: FAMILY MEDICINE

## 2018-07-02 PROCEDURE — 81001 URINALYSIS AUTO W/SCOPE: CPT | Performed by: FAMILY MEDICINE

## 2018-07-02 PROCEDURE — 94660 CPAP INITIATION&MGMT: CPT

## 2018-07-02 PROCEDURE — 93005 ELECTROCARDIOGRAM TRACING: CPT

## 2018-07-02 PROCEDURE — 85025 COMPLETE CBC W/AUTO DIFF WBC: CPT | Performed by: EMERGENCY MEDICINE

## 2018-07-02 PROCEDURE — 99285 EMERGENCY DEPT VISIT HI MDM: CPT

## 2018-07-02 PROCEDURE — 96374 THER/PROPH/DIAG INJ IV PUSH: CPT

## 2018-07-02 PROCEDURE — 74011250637 HC RX REV CODE- 250/637: Performed by: FAMILY MEDICINE

## 2018-07-02 PROCEDURE — 82803 BLOOD GASES ANY COMBINATION: CPT

## 2018-07-02 PROCEDURE — 74011250636 HC RX REV CODE- 250/636: Performed by: FAMILY MEDICINE

## 2018-07-02 PROCEDURE — 74011250636 HC RX REV CODE- 250/636: Performed by: EMERGENCY MEDICINE

## 2018-07-02 PROCEDURE — 83605 ASSAY OF LACTIC ACID: CPT | Performed by: FAMILY MEDICINE

## 2018-07-02 PROCEDURE — P9047 ALBUMIN (HUMAN), 25%, 50ML: HCPCS | Performed by: FAMILY MEDICINE

## 2018-07-02 PROCEDURE — 71045 X-RAY EXAM CHEST 1 VIEW: CPT

## 2018-07-02 PROCEDURE — 87040 BLOOD CULTURE FOR BACTERIA: CPT | Performed by: EMERGENCY MEDICINE

## 2018-07-02 PROCEDURE — 77030012879 HC MSK CPAP FLL FAC PHIL -B

## 2018-07-02 PROCEDURE — 83036 HEMOGLOBIN GLYCOSYLATED A1C: CPT | Performed by: EMERGENCY MEDICINE

## 2018-07-02 RX ORDER — LEVOTHYROXINE SODIUM 88 UG/1
88 TABLET ORAL
Status: DISCONTINUED | OUTPATIENT
Start: 2018-07-03 | End: 2018-07-06 | Stop reason: HOSPADM

## 2018-07-02 RX ORDER — ONDANSETRON 4 MG/1
4 TABLET, ORALLY DISINTEGRATING ORAL
COMMUNITY
End: 2018-10-19 | Stop reason: ALTCHOICE

## 2018-07-02 RX ORDER — DEXTROSE 50 % IN WATER (D50W) INTRAVENOUS SYRINGE
12.5-25 AS NEEDED
Status: DISCONTINUED | OUTPATIENT
Start: 2018-07-02 | End: 2018-07-06 | Stop reason: HOSPADM

## 2018-07-02 RX ORDER — ASPIRIN 81 MG/1
81 TABLET ORAL DAILY
Status: DISCONTINUED | OUTPATIENT
Start: 2018-07-03 | End: 2018-07-06 | Stop reason: HOSPADM

## 2018-07-02 RX ORDER — FUROSEMIDE 10 MG/ML
60 INJECTION INTRAMUSCULAR; INTRAVENOUS 2 TIMES DAILY
Status: CANCELLED | OUTPATIENT
Start: 2018-07-02

## 2018-07-02 RX ORDER — ALBUMIN HUMAN 250 G/1000ML
12.5 SOLUTION INTRAVENOUS EVERY 6 HOURS
Status: COMPLETED | OUTPATIENT
Start: 2018-07-02 | End: 2018-07-04

## 2018-07-02 RX ORDER — NITROGLYCERIN 0.4 MG/1
0.4 TABLET SUBLINGUAL AS NEEDED
Status: DISCONTINUED | OUTPATIENT
Start: 2018-07-02 | End: 2018-07-06 | Stop reason: HOSPADM

## 2018-07-02 RX ORDER — MAGNESIUM SULFATE 100 %
4 CRYSTALS MISCELLANEOUS AS NEEDED
Status: DISCONTINUED | OUTPATIENT
Start: 2018-07-02 | End: 2018-07-06 | Stop reason: HOSPADM

## 2018-07-02 RX ORDER — CLOPIDOGREL BISULFATE 75 MG/1
75 TABLET ORAL DAILY
Status: DISCONTINUED | OUTPATIENT
Start: 2018-07-03 | End: 2018-07-06 | Stop reason: HOSPADM

## 2018-07-02 RX ORDER — FUROSEMIDE 20 MG/1
20 TABLET ORAL DAILY
COMMUNITY
End: 2018-07-06

## 2018-07-02 RX ORDER — INSULIN GLARGINE 100 [IU]/ML
10 INJECTION, SOLUTION SUBCUTANEOUS DAILY
Status: DISCONTINUED | OUTPATIENT
Start: 2018-07-03 | End: 2018-07-04

## 2018-07-02 RX ORDER — FUROSEMIDE 10 MG/ML
80 INJECTION INTRAMUSCULAR; INTRAVENOUS
Status: COMPLETED | OUTPATIENT
Start: 2018-07-02 | End: 2018-07-02

## 2018-07-02 RX ORDER — FUROSEMIDE 10 MG/ML
80 INJECTION INTRAMUSCULAR; INTRAVENOUS 2 TIMES DAILY
Status: DISCONTINUED | OUTPATIENT
Start: 2018-07-02 | End: 2018-07-03

## 2018-07-02 RX ORDER — DOCUSATE SODIUM 100 MG/1
100 CAPSULE, LIQUID FILLED ORAL 2 TIMES DAILY
Status: DISCONTINUED | OUTPATIENT
Start: 2018-07-02 | End: 2018-07-06 | Stop reason: HOSPADM

## 2018-07-02 RX ORDER — ANASTROZOLE 1 MG/1
1 TABLET ORAL DAILY
Status: DISCONTINUED | OUTPATIENT
Start: 2018-07-02 | End: 2018-07-06 | Stop reason: HOSPADM

## 2018-07-02 RX ORDER — INSULIN LISPRO 100 [IU]/ML
INJECTION, SOLUTION INTRAVENOUS; SUBCUTANEOUS EVERY 6 HOURS
Status: DISCONTINUED | OUTPATIENT
Start: 2018-07-02 | End: 2018-07-06 | Stop reason: HOSPADM

## 2018-07-02 RX ORDER — FOLIC ACID 1 MG/1
1 TABLET ORAL DAILY
Status: DISCONTINUED | OUTPATIENT
Start: 2018-07-03 | End: 2018-07-06 | Stop reason: HOSPADM

## 2018-07-02 RX ORDER — SODIUM CHLORIDE 0.9 % (FLUSH) 0.9 %
5-10 SYRINGE (ML) INJECTION EVERY 8 HOURS
Status: DISCONTINUED | OUTPATIENT
Start: 2018-07-02 | End: 2018-07-06 | Stop reason: HOSPADM

## 2018-07-02 RX ORDER — CARVEDILOL 6.25 MG/1
6.25 TABLET ORAL 2 TIMES DAILY WITH MEALS
Status: DISCONTINUED | OUTPATIENT
Start: 2018-07-02 | End: 2018-07-06 | Stop reason: HOSPADM

## 2018-07-02 RX ORDER — FUROSEMIDE 10 MG/ML
INJECTION INTRAMUSCULAR; INTRAVENOUS
Status: DISPENSED
Start: 2018-07-02 | End: 2018-07-02

## 2018-07-02 RX ORDER — SODIUM CHLORIDE 0.9 % (FLUSH) 0.9 %
5-10 SYRINGE (ML) INJECTION AS NEEDED
Status: DISCONTINUED | OUTPATIENT
Start: 2018-07-02 | End: 2018-07-06 | Stop reason: HOSPADM

## 2018-07-02 RX ORDER — GLIMEPIRIDE 1 MG/1
1 TABLET ORAL
COMMUNITY
End: 2018-07-06

## 2018-07-02 RX ADMIN — DOCUSATE SODIUM 100 MG: 100 CAPSULE, LIQUID FILLED ORAL at 17:53

## 2018-07-02 RX ADMIN — Medication 10 ML: at 14:05

## 2018-07-02 RX ADMIN — FUROSEMIDE 80 MG: 10 INJECTION, SOLUTION INTRAMUSCULAR; INTRAVENOUS at 09:36

## 2018-07-02 RX ADMIN — FUROSEMIDE 80 MG: 10 INJECTION, SOLUTION INTRAMUSCULAR; INTRAVENOUS at 17:53

## 2018-07-02 RX ADMIN — ANASTROZOLE 1 MG: 1 TABLET, COATED ORAL at 21:15

## 2018-07-02 RX ADMIN — CARVEDILOL 6.25 MG: 6.25 TABLET, FILM COATED ORAL at 17:53

## 2018-07-02 RX ADMIN — Medication 10 ML: at 22:00

## 2018-07-02 RX ADMIN — INSULIN LISPRO 3 UNITS: 100 INJECTION, SOLUTION INTRAVENOUS; SUBCUTANEOUS at 14:04

## 2018-07-02 RX ADMIN — ALBUMIN (HUMAN) 12.5 G: 0.25 INJECTION, SOLUTION INTRAVENOUS at 17:52

## 2018-07-02 RX ADMIN — ALBUMIN (HUMAN) 12.5 G: 0.25 INJECTION, SOLUTION INTRAVENOUS at 13:58

## 2018-07-02 NOTE — H&P
[address]   HISTORY AND PHYSICAL      Prashanth Bush  MR#: [id number]  : 1935  ACCOUNT #: [de-identified]   ADMIT DATE: 2018    CHIEF COMPLAINT:  Shortness of breath. HISTORY OF PRESENT ILLNESS:  The patient is an 81-year-old female with past medical history of chronic respiratory failure, recurrent episodes of CHF exacerbation and pulmonary edema, history of coronary artery disease with multiple episodes of elevated troponin, history of severe multivessel disease, history of CKD stage III, anemia of chronic disease, metastatic left breast cancer, hypertension and diabetes, who presents to the hospital with the above-mentioned symptoms. The patient reports that she was recently admitted in  with shortness of breath secondary to \"fluid in her lungs. \"  The patient reports that there was concern that she may have had an infection and was started on antibiotics. .  Regardless, the patient reports that she lives at [facility], and started having some shortness of breath yesterday. She reports that today, the shortness of breath got worse. EMS was called, and the patient was found to have a pulse oximetry of 64% on room air. EMS put the patient on CPAP and the patient's saturations got better. The patient came in, was found to be in pulmonary edema and was requested to be admitted under the hospitalist service. The patient was put on BiPAP in the ER. The patient currently is resting in bed and reports that she is feeling better. She denies any other complaints or problems. Denies any headache, blurry vision, sore throat, trouble swallowing, trouble with speech. Denies any chest pain, abdominal pain, constipation, diarrhea, hematemesis, melena, hemoptysis, fever or chills. Does report mild orthopnea associated with a cough, but reports that the cough is dry.   Denies any urinary symptoms, focal or generalized neurological weakness, falls, injuries or any other concerns or problems. PAST MEDICAL HISTORY:  See above. HOME MEDICATIONS:  Currently, the patient is on Forteo 600 mcg b.i.d., furosemide 80 mg daily, Tylenol p.r.n., B complex with vitamin C, Lantus 10 units subcutaneous daily, Bengay, Ibrance 125 mg daily, prednisone 5 mg daily taper, which has been completed, Mylicon, Nitrostat, Plavix 75 mg daily, carvedilol 6.25 mg  b.i.d., Arimidex 1 mg daily, Colace, probiotic, MiraLax, Epogen, Amaryl 1 mg daily, levothyroxine 88 mcg daily, Enbrel 50 mg every 7 days, Repatha every 14 days, folic acid, multivitamin, aspirin 81 mg daily, omega 3 fatty acid and cholecalciferol. SOCIAL HISTORY:  No history of tobacco use, alcohol use or IV drug abuse. Lives at home. FAMILY HISTORY:  Mother history of diabetes and stroke. Sister has a history of diabetes. Brother has history of diabetes. ALLERGIES:  STATINS AND SULFA. REVIEW OF SYMPTOMS:  All systems were reviewed and found to be essentially negative, except for the symptoms mentioned above. PHYSICAL EXAMINATION:  VITAL SIGNS:  Temperature 97.4, pulse 115, respiration rate 25, blood pressure 104/50, pulse oximetry 98% on BiPAP. GENERAL:  Alert x3, awake, moderately distressed, pleasant female, appears to be stated age. HEENT:  Pupils equal and reactive to light. Dry mucous membranes. Tympanic membranes clear. NECK:  Supple. CHEST:  Decreased basilar breath sounds. CORONARY:  S1, S2 were heard. ABDOMEN:  Soft, nontender, nondistended. Bowel sounds are physiological.  EXTREMITIES:  No clubbing, no cyanosis, no edema. NEUROPSYCHIATRIC:  Pleasant mood and affect. Cranial nerves II-XII grossly intact. Sensory grossly within normal limits. Moves all 4. SKIN:  Warm. LABORATORY DATA:  White count 3.5, hemoglobin 13.1, hematocrit 43.3, platelets 185.   Sodium 137, potassium 4.0, chloride 106, bicarbonate 19, anion gap 12, glucose 362, BUN 18, creatinine 1.31, calcium 8.9, bilirubin total 0.5, ALT 21, AST 29, alkaline phosphatase 747. Lactic acid 3.1. Troponin less than 0.05. Blood culture pending. ABG shows a pH of 7.243, pCO2 of 52.3, pO2 of 109. IMAGING:  X-ray of the chest shows pulmonary edema with small left pleural effusion, with left lower lobe atelectasis. ASSESSMENT AND PLAN:  1. Shortness of breath, secondary to acute on chronic hypoxic respiratory failure. The patient will be admitted to the intensive care unit. We will start the patient on BiPAP. Most likely secondary to pulmonary edema due to congestive heart failure exacerbation. We will provide aggressive IV diuretic. Cardiology consult has been obtained. The patient had a recent echocardiogram done on 06/01/2018, which showed systolic function that was markedly to moderately reduced, ejection fraction of 30% and a small pericardial effusion. We will continue the patient on aspirin and Plavix. We will continue the patient on BiPAP. Strict ins and outs, daily weights and close monitoring. We will get a CT of the chest to rule out any acute pathology and any pleural effusion. The patient is mildly hypotensive, thus will start patient on some albumin to maintain normotensive state so she can be diuresed. We will provide supportive care and continue to monitor. Await Cardiology input and reassess as needed. 2.  Diabetes mellitus type 2. The patient will be started on sliding scale, Accu-Cheks, diet control and close monitoring. 3.  Lactic acidosis, unclear etiology. Unfortunately, cannot provide any fluids to the patient. There are no obvious signs of infection. Urinalysis has been ordered. Will hold antibiotics for now. Could be underlying disease. Repeat lactic acid every 4 hours. We will continue to closely monitor. Further intervention will be per hospital course. 4.  Congestive heart failure, [place] Heart Association class IV. Please see above. Will provide IV diuretics.   Strict ins and outs, daily weights, aspirin and Plavix. Recent echocardiogram done, please see report above. We will provide oxygen support, BiPAP and continue to closely monitor. Further intervention will be per hospital course. Cardiology consultation has been requested. 5.  Chronic kidney disease stage III, stable. Continue to monitor. Continue . 6. History of breast cancer, stage IV with metastasis. The patient probably needs palliative care, may consider getting palliative consult in the morning. 7.  Gastrointestinal and deep venous thrombosis prophylaxis. The patient will be on sequential compression devices.       Tyler Toth MD MM/BRUNA  D: 07/02/2018 11:39     T: 07/02/2018 12:28  JOB #: [id number]

## 2018-07-02 NOTE — IP AVS SNAPSHOT
2700 [facility] [address] [place] [address] 
[phone] Patient: Zara Peñaloza MRN: [id number] UOK:[id number] About your hospitalization You were admitted on:  July 2, 2018 You last received care in the:  [address] [place] [address] SURG You were discharged on:  July 6, 2018 Why you were hospitalized Your primary diagnosis was:  Sob (Shortness Of Breath) Follow-up Information Follow up With Details Comments Contact Info Jami Gerber MD Schedule an appointment as soon as possible for a visit  [address] [address] [address] 250 [address] [place] [address] 
[phone] Discharge Orders None A check stella indicates which time of day the medication should be taken. My Medications CHANGE how you take these medications Instructions Each Dose to Equal  
 Morning Noon Evening Bedtime  
 furosemide 40 mg tablet Commonly known as:  LASIX What changed:   
- medication strength 
- how much to take Your last dose was: Your next dose is: Take 2 Tabs by mouth daily. 80 mg CONTINUE taking these medications Instructions Each Dose to Equal  
 Morning Noon Evening Bedtime  
 acetaminophen 325 mg tablet Commonly known as:  TYLENOL Your last dose was: Your next dose is: Take 2 Tabs by mouth every four (4) hours as needed. 650 mg  
    
   
   
   
  
 anastrozole 1 mg tablet Commonly known as:  ARIMIDEX Your last dose was: Your next dose is: Take 1 Tab by mouth daily. 1 mg  
    
   
   
   
  
 aspirin delayed-release 81 mg tablet Your last dose was: Your next dose is: Take 81 mg by mouth daily. 81 mg  
    
   
   
   
  
 b-complex with vitamin c tablet Your last dose was: Your next dose is: Take 1 Tab by mouth daily. 1 Tab carvedilol 6.25 mg tablet Commonly known as:  Amadou Da Silva Your last dose was: Your next dose is: Take 1 Tab by mouth two (2) times daily (with meals). 6.25 mg  
    
   
   
   
  
 clopidogrel 75 mg Tab Commonly known as:  PLAVIX Your last dose was: Your next dose is: Take 1 Tab by mouth daily. 75 mg  
    
   
   
   
  
 docusate sodium 100 mg capsule Commonly known as:  Lindsay Rima Your last dose was: Your next dose is: Take 1 Cap by mouth two (2) times a day for 90 days. 100 mg MAKAYLA-C 500 mg Tab Generic drug:  ascorbate calcium Your last dose was: Your next dose is: Take 1,000 mg by mouth daily. 1000 mg  
    
   
   
   
  
 folic acid 1 mg tablet Commonly known as:  Teresa Your last dose was: Your next dose is: TAKE ONE TABLET BY MOUTH DAILY  
     
   
   
   
  
 HAIR,SKIN & NAILS PO Your last dose was: Your next dose is: Take 1 Tab by mouth daily. 1 Tab  
    
   
   
   
  
 insulin glargine 100 unit/mL injection Commonly known as:  LANTUS Your last dose was: Your next dose is:    
   
   
 10 Units by SubCUTAneous route daily. Indications: type 2 diabetes mellitus 10 Units L. acidoph & paracasei- S therm- Bifido 8 billion cell Cap cap Commonly known as:  BERYL-Q/RISAQUAD Your last dose was: Your next dose is: Take 1 Cap by mouth daily. 1 Cap  
    
   
   
   
  
 methyl salicylate-menthol 87-83 % topical cream  
Commonly known as:  Laura Jorge Luis Your last dose was: Your next dose is:    
   
   
 Apply  to affected area as needed for Pain. APPLY TO right shoulder  Nursing, document site in comments  
     
   
   
   
  
 nitroglycerin 0.4 mg SL tablet Commonly known as:  NITROSTAT Your last dose was: Your next dose is:    
   
   
 1 Tab by SubLINGual route as needed for Chest Pain. Up to 3 doses. 0.4 mg  
    
   
   
   
  
 OCUVITE PO Your last dose was: Your next dose is: Take 1 Tab by mouth daily. 1 Tab OMEGA 3 FISH OIL PO Your last dose was: Your next dose is: Take 300 mg by mouth daily. 300 mg  
    
   
   
   
  
 polyethylene glycol 17 gram packet Commonly known as:  Larry Deck Your last dose was: Your next dose is: Take 1 Packet by mouth daily. 17 g  
    
   
   
   
  
 simethicone 80 mg chewable tablet Commonly known as:  Dirk Kurk Your last dose was: Your next dose is: Take 1 Tab by mouth as needed. Indications: FLATULENCE  
 80 mg SYNTHROID 88 mcg tablet Generic drug:  levothyroxine Your last dose was: Your next dose is: Take 88 mcg by mouth Daily (before breakfast). 88 mcg VITAMIN D3 PO Your last dose was: Your next dose is: Take 1,000 Units by mouth daily. 1000 Units ZOFRAN ODT 4 mg disintegrating tablet Generic drug:  ondansetron Your last dose was: Your next dose is: Take 4 mg by mouth every four (4) hours as needed for Nausea. 4 mg STOP taking these medications   
 epoetin celio 10,000 unit/mL injection Commonly known as:  EPOGEN;PROCRIT  
   
  
 etanercept 50 mg/mL (0.98 mL) injection Commonly known as:  ENBREL  
   
  
 evolocumab pen injection Commonly known as:  Zaid Ades FORTEO 20 mcg/dose - 600 mcg/2.4 mL Pnij injection Generic drug:  teriparatide  
   
  
 glimepiride 1 mg tablet Commonly known as:  AMARYL  
   
  
 palbociclib 125 mg Cap Commonly known as:  [employer] Where to Get Your Medications Information on where to get these meds will be given to you by the nurse or doctor. ! Ask your nurse or doctor about these medications  
  furosemide 40 mg tablet Discharge Instructions Discharge Instructions PATIENT ID: Katherin Charles MRN: [id number] YOB: 1935 DATE OF ADMISSION: 7/2/2018  9:24 AM   
DATE OF DISCHARGE: 7/6/2018 PRIMARY CARE PROVIDER: Elma Paredes MD  
 
ATTENDING PHYSICIAN: Donny Mcginnis MD 
DISCHARGING PROVIDER: Donny Mcginnis MD   
To contact this individual call [phone] and ask the  to page. If unavailable ask to be transferred the Adult Hospitalist Department. DISCHARGE DIAGNOSES Heart Failure Exacerbation CONSULTATIONS: None PROCEDURES/SURGERIES: * No surgery found * PENDING TEST RESULTS:  
At the time of discharge the following test results are still pending: None FOLLOW UP APPOINTMENTS:  
Follow-up Information Follow up With Details Comments Contact Info Elma Paredes MD Schedule an appointment as soon as possible for a visit  [address] [address] [facility] 250 [address] [place] [address] 
[phone] ADDITIONAL CARE RECOMMENDATIONS:  
 
You came in with shortness of breath from fluid build up. PLEASE take a higher dose of the fluid pill or Lasix at 80 mg. You are now feeling better and it is safe for you to go to a rehab facility to work on your strength and continue to improve. Please follow-up with your PCP and Oncologist. 
 
DIET: Cardiac Diet and Diabetic Diet ACTIVITY: Activity as tolerated DISCHARGE MEDICATIONS: 
 See Medication Reconciliation Form · It is important that you take the medication exactly as they are prescribed. · Keep your medication in the bottles provided by the pharmacist and keep a list of the medication names, dosages, and times to be taken in your wallet. · Do not take other medications without consulting your doctor. NOTIFY YOUR PHYSICIAN FOR ANY OF THE FOLLOWING:  
Fever over 101 degrees for 24 hours. Chest pain, shortness of breath, fever, chills, nausea, vomiting, diarrhea, change in mentation, falling, weakness, bleeding. Severe pain or pain not relieved by medications. Or, any other signs or symptoms that you may have questions about. DISPOSITION: 
  Home With: 
 OT  PT  [facility]  RN  
  
 SNF/Inpatient Rehab/LTAC Independent/assisted living Hospice Other:  
 
 
Signed:  
Mindy Escobar MD 
7/6/2018 
3:06 PM  
Pulmonary Edema: Care Instructions Your Care Instructions Pulmonary edema is the buildup of fluid in the lungs. It usually occurs when the heart does not pump blood through the body properly. Pulmonary edema can also be caused by another disease, such as liver or kidney failure. It can also happen at high altitudes, from a poisoning, or as a result of a near-drowning. If you have fluid in your lungs, you may have trouble breathing, be restless, have a fast heart rate, or cough up foamy pink fluid. Breathing problems may be worse when you lie down. Follow-up care is a key part of your treatment and safety. Be sure to make and go to all appointments, and call your doctor if you are having problems. It's also a good idea to know your test results and keep a list of the medicines you take. How can you care for yourself at home? Medicines ? · Take your medicines exactly as prescribed. Call your doctor if you think you are having a problem with your medicine. ? · Review all of your regular medicines with your doctor. Do not take any vitamins, over-the-counter medicines, or herbal products without talking to your doctor first.  
Diet ? · Eat a balanced diet. Make an appointment with a dietitian if you have questions about what type of diet might be best for you. ? · Do not eat more than 2,000 milligrams (mg) of sodium each day.  That is less than 1 teaspoon of salt a day, including all the salt you eat in prepared or packaged foods. ¨ Do not add salt while you are cooking or at the table. Flavor with garlic, lemon juice, onion, vinegar, herbs, and spices instead of salt. ¨ Eat fewer processed foods and foods from restaurants, including fast food. ¨ Use fresh or frozen foods instead of canned. ¨ Count and record how much sodium you eat each day. Check food labels for sodium. ¨ Ask your doctor before using salt substitutes that have potassium, such as Lite Salt. ? Lifestyle ? · Stay out of air pollution; smog; cold, dry air; hot, humid air; and high altitudes. ? · Learn breathing methods that help the airflow in and out of your lungs. ? · Take rest breaks often. Schedule short rest breaks when doing housework and other activities. An occupational or physical therapist can help you find ways to do everyday activities with less effort. ? · Start light exercise if your doctor says it is okay. Try to stay as active as possible. If you have not exercised in the past, start out slowly. Walking is a good way to start. ? · Get enough rest at night. Sleeping with 1 or 2 pillows under your upper body and head may help you breathe easier at night. ? · Discuss rehabilitation with your doctor. Find out what programs are available in your area. ? · Do not smoke or use other tobacco products. Smoking can make your condition worse. If you need help quitting, talk to your doctor about stop-smoking programs and medicines. These can increase your chances of quitting for good. ? · Do not use alcohol or illegal drugs. When should you call for help? Call 911 anytime you think you may need emergency care. For example, call if: 
? · You have severe trouble breathing. ? · You passed out (lost consciousness). ? · You have symptoms of a heart attack. These may include: ¨ Chest pain or pressure, or a strange feeling in the chest. 
¨ Sweating. ¨ Shortness of breath. ¨ Nausea or vomiting. ¨ Pain, pressure, or a strange feeling in the back, neck, jaw, or upper belly or in one or both shoulders or arms. ¨ Lightheadedness or sudden weakness. ¨ A fast or irregular heartbeat. Pain that spreads from the chest to the neck, jaw, or one or both shoulders or arms. ? After you call 911, the  may tell you to chew 1 adult-strength or 2 to 4 low-dose aspirin. Wait for an ambulance. Do not try to drive yourself. ?Call your doctor now or seek immediate medical care if: 
? · You have trouble breathing or have wheezing that is getting worse. ? · You are coughing more deeply or more often. ? · You cough up blood. ? · You get a fever. ? · You have more swelling in your legs or belly. ? · Your symptoms are getting worse. ? Watch closely for changes in your health, and be sure to contact your doctor if you have any problems. Where can you learn more? Go to http://david-noni.info/. Enter Y042 in the search box to learn more about \"Pulmonary Edema: Care Instructions. \" Current as of: May 12, 2017 Content Version: 11.4 © 3948-5632 Moodsnap. Care instructions adapted under license by [employer] (which disclaims liability or warranty for this information). If you have questions about a medical condition or this instruction, always ask your healthcare professional. [place] [address] any warranty or liability for your use of this information. [email or web address] Announcement We are excited to announce that we are making your provider's discharge notes available to you in [email or web address]. You will see these notes when they are completed and signed by the physician that discharged you from your recent hospital stay.   If you have any questions or concerns about any information you see in [email or web address], please call the [facility] Department where you were seen or reach out to your Primary Care Provider for more information about your plan of care. Introducing [facility]! [employer] introduces [employer] patient portal. Now you can access parts of your medical record, email your doctor's office, and request medication refills online. 1. In your internet browser, go to [email or web address] Incubatort 2. Click on the First Time User? Click Here link in the Sign In box. You will see the New Member Sign Up page. 3. Enter your Odojo Access Code exactly as it appears below. You will not need to use this code after youve completed the sign-up process. If you do not sign up before the expiration date, you must request a new code. · Odojo Access Code: [email or web address] Expires: 8/9/2018 10:02 AM 
 
4. Enter the last four digits of your Social Security Number (xxxx) and Date of Birth (mm/dd/yyyy) as indicated and click Submit. You will be taken to the next sign-up page. 5. Create a Odojo ID. This will be your Odojo login ID and cannot be changed, so think of one that is secure and easy to remember. 6. Create a Odojo password. You can change your password at any time. 7. Enter your Password Reset Question and Answer. This can be used at a later time if you forget your password. 8. Enter your e-mail address. You will receive e-mail notification when new information is available in [address] 9. Click Sign Up. You can now view and download portions of your medical record. 10. Click the Download Summary menu link to download a portable copy of your medical information. If you have questions, please visit the Frequently Asked Questions section of the Odojo website. Remember, Odojo is NOT to be used for urgent needs. For medical emergencies, dial 911. Now available from your iPhone and Android! Introducing Eliot Vazquez As a [employer] [facility] patient, I wanted to make you aware of our electronic visit tool called Eliot Vazquez. wufoo/Say[employer] [place] allows you to connect within minutes with a medical provider 24 hours a day, seven days a week via a mobile device or tablet or logging into a secure website from your computer. You can access Eliot Pettynahomifin from anywhere in the [place]. A virtual visit might be right for you when you have a simple condition and feel like you just dont want to get out of bed, or cant get away from work for an appointment, when your regular [facility] provider is not available (evenings, weekends or holidays), or when youre out of town and need minor care. Electronic visits cost only $49 and if the wufoo/SayTaxi [place] provider determines a prescription is needed to treat your condition, one can be electronically transmitted to a nearby pharmacy*. Please take a moment to enroll today if you have not already done so. The enrollment process is free and takes just a few minutes. To enroll, please download the Instagram teresita to your tablet or phone, or visit [email or web address] to enroll on your computer. And, as an [address] [place] [address] patient with a Effektif account, the results of your visits will be scanned into your electronic medical record and your primary care provider will be able to view the scanned results. We urge you to continue to see your regular [facility] provider for your ongoing medical care. And while your primary care provider may not be the one available when you seek a Eliot Pettynahomifin virtual visit, the peace of mind you get from getting a real diagnosis real time can be priceless. For more information on Eliot Jovannyperico, view our Frequently Asked Questions (FAQs) at [email or web address]. Sincerely, 
 
Mary Wasserman MD 
Chief Medical Officer Laury Sellers *:  certain medications cannot be prescribed via BreadcrumbtrackingnahomiWinWeb Unresulted Labs-Please follow up with your PCP about these lab tests Order Current Status CULTURE, BLOOD, PAIRED Preliminary result Providers Seen During Your Hospitalization Provider Specialty Primary office phone Claudia Hammer MD Emergency Medicine [phone] Irving Moon MD Hospitalist [phone] Se August MD Hospitalist [phone] Your Primary Care Physician (PCP) Primary Care Physician Office Phone Office Fax Deja Welsh [phone] You are allergic to the following Allergen Reactions Statins-Hmg-Coa Reductase Inhibitors Other (comments) Intolerant to statins Sulfa (Sulfonamide Antibiotics) Other (comments)  
 syncope Recent Documentation Height Weight BMI OB Status Smoking Status 1.549 m 54.3 kg 22.62 kg/m2 Hysterectomy Never Smoker Emergency Contacts Name Discharge Info Relation Home Work Mobile [address] CAREGIVER [3] Son [22] [phone] [address] CAREGIVER [3] Son [22] [phone] Robin Ledesma N/A  AT THIS TIME [6] Child [2] [phone] Patient Belongings The following personal items are in your possession at time of discharge: 
                             
 
  
  
Discharge Instructions Attachments/References HEART FAILURE: AVOIDING TRIGGERS (ENGLISH) Patient Handouts Avoiding Triggers With Heart Failure: Care Instructions Your Care Instructions Triggers are anything that make your heart failure flare up. A flare-up is also called \"sudden heart failure\" or \"acute heart failure. \" When you have a flare-up, fluid builds up in your lungs, and you have problems breathing. You might need to go to the hospital. By watching for changes in your condition and avoiding triggers, you can prevent heart failure flare-ups. Follow-up care is a key part of your treatment and safety. Be sure to make and go to all appointments, and call your doctor if you are having problems. It's also a good idea to know your test results and keep a list of the medicines you take. How can you care for yourself at home? Watch for changes in your weight and condition · Weigh yourself without clothing at the same time each day. Record your weight. Call your doctor if you have sudden weight gain, such as more than 2 to 3 pounds in a day or 5 pounds in a week. (Your doctor may suggest a different range of weight gain.) A sudden weight gain may mean that your heart failure is getting worse. · Keep a daily record of your symptoms. Write down any changes in how you feel, such as new shortness of breath, cough, or problems eating. Also record if your ankles are more swollen than usual and if you feel more tired than usual. Note anything that you ate or did that could have triggered these changes. Limit sodium Sodium causes your body to hold on to extra water. This may cause your heart failure symptoms to get worse. People get most of their sodium from processed foods. Fast food and restaurant meals also tend to be very high in sodium. · Your doctor may suggest that you limit sodium to 2,000 milligrams (mg) a day or less. That is less than 1 teaspoon of salt a day, including all the salt you eat in cooking or in packaged foods. · Read food labels on cans and food packages. They tell you how much sodium you get in one serving. Check the serving size. If you eat more than one serving, you are getting more sodium. · Be aware that sodium can come in forms other than salt, including monosodium glutamate (MSG), sodium citrate, and sodium bicarbonate (baking soda). MSG is often added to Asian food. You can sometimes ask for food without MSG or salt. · Slowly reducing salt will help you adjust to the taste. Take the salt shaker off the table. · Flavor your food with garlic, lemon juice, onion, vinegar, herbs, and spices instead of salt. Do not use soy sauce, steak sauce, onion salt, garlic salt, mustard, or ketchup on your food, unless it is labeled \"low-sodium\" or \"low-salt. \" 
· Make your own salad dressings, sauces, and ketchup without adding salt. · Use fresh or frozen ingredients, instead of canned ones, whenever you can. Choose low-sodium canned goods. · Eat less processed food and food from restaurants, including fast food. Exercise as directed Moderate, regular exercise is very good for your heart. It improves your blood flow and helps control your weight. But too much exercise can stress your heart and cause a heart failure flare-up. · Check with your doctor before you start an exercise program. 
· Walking is an easy way to get exercise. Start out slowly. Gradually increase the length and pace of your walk. Swimming, riding a bike, and using a treadmill are also good forms of exercise. · When you exercise, watch for signs that your heart is working too hard. You are pushing yourself too hard if you cannot talk while you are exercising. If you become short of breath or dizzy or have chest pain, stop, sit down, and rest. 
· Do not exercise when you do not feel well. Take medicines correctly · Take your medicines exactly as prescribed. Call your doctor if you think you are having a problem with your medicine. · Make a list of all the medicines you take. Include those prescribed to you by other doctors and any over-the-counter medicines, vitamins, or supplements you take. Take this list with you when you go to any doctor. · Take your medicines at the same time every day. It may help you to post a list of all the medicines you take every day and what time of day you take them. · Make taking your medicine as simple as you can.  Plan times to take your medicines when you are doing other things, such as eating a meal or getting ready for bed. This will make it easier to remember to take your medicines. · Get organized. Use helpful tools, such as daily or weekly pill containers. When should you call for help? Call 911 if you have symptoms of sudden heart failure such as: 
? · You have severe trouble breathing. ? · You cough up pink, foamy mucus. ? · You have a new irregular or rapid heartbeat. ?Call your doctor now or seek immediate medical care if: 
? · You have new or increased shortness of breath. ? · You are dizzy or lightheaded, or you feel like you may faint. ? · You have sudden weight gain, such as more than 2 to 3 pounds in a day or 5 pounds in a week. (Your doctor may suggest a different range of weight gain.) ? · You have increased swelling in your legs, ankles, or feet. ? · You are suddenly so tired or weak that you cannot do your usual activities. ? Watch closely for changes in your health, and be sure to contact your doctor if you develop new symptoms. Where can you learn more? Go to http://david-noni.info/. Enter O610 in the search box to learn more about \"Avoiding Triggers With Heart Failure: Care Instructions. \" Current as of: September 21, 2016 Content Version: 11.4 © 1918-3706 [employer]. Care instructions adapted under license by [employer] (which disclaims liability or warranty for this information). If you have questions about a medical condition or this instruction, always ask your healthcare professional. [place] [address] any warranty or liability for your use of this information. Please provide this summary of care documentation to your next provider. Signatures-by signing, you are acknowledging that this After Visit Summary has been reviewed with you and you have received a copy. Patient Signature:  ____________________________________________________________ Date:  ____________________________________________________________  
  
Janyth Mins Provider Signature:  ____________________________________________________________ Date:  ____________________________________________________________

## 2018-07-02 NOTE — PROGRESS NOTES
Pt was DNR with no intubation, no compressions last admission.  (per DNR form6/1/18). Confirmed with pt's son Dayana Ledesma)/pt - no compressions, no intubation. Vasopressors and CPAP/BiPAP ok. Discussed with primary team- Dr. Janeth Sepulveda. Form completed.

## 2018-07-02 NOTE — PROGRESS NOTES
Admission Medication Reconciliation:    Information obtained from: Patient interview / Patient pharmacy medication list- [facility]     Significant PMH/Disease States:   Past Medical History:   Diagnosis Date    Anemia 9/2/2009    [facility] cancer ([facility] 9/2/2009    surgery/chemo    Colon cancer ([facility] 9/2/2009    DM (diabetes mellitus) ([facility] 9/2/2009    GERD (gastroesophageal reflux disease)     H/O: CVA 9/2/2009    slight l sided weakness    Hypothyroid 9/2/2009    Ill-defined condition     seasonal allergies    Microalbuminuria 9/2/2009    Osteoporosis 9/2/2009    Other and unspecified hyperlipidemia 1/27/2010    Rheumatoid arthritis involving ankle ([facility] 9/28/2016    Rheumatoid arthritis(714.0) 9/2/2009    Unspecified essential hypertension 9/2/2009    Weakness due to cerebrovascular accident        Chief Complaint for this Admission:  Chest pain    Allergies:  Statins-hmg-coa reductase inhibitors and Sulfa (sulfonamide antibiotics)    Prior to Admission Medications:   Prior to Admission Medications   Prescriptions Last Dose Informant Patient Reported? Taking? CHOLECALCIFEROL, VITAMIN D3, (VITAMIN D-3 PO) 7/1/2018 at Unknown time  Yes Yes   Sig: Take 1,000 Units by mouth daily. FORTEO 20 mcg/dose - 600 mcg/2.4 mL pnij injection 7/1/2018 at Unknown time  No Yes   Sig: INJECT 20MCG ONCE DAILY   L. acidoph & paracasei- S therm- Bifido (BERYL-Q/RISAQUAD) 8 billion cell cap cap 7/1/2018 at Unknown time  No Yes   Sig: Take 1 Cap by mouth daily. MULTIVITAMIN WITH MINERALS (HAIR,SKIN & NAILS PO) Unknown at Unknown time  Yes yes   Sig: Take 1 Tab by mouth daily. OMEGA-3 FATTY ACIDS/FISH OIL (OMEGA 3 FISH OIL PO) Unknown at Unknown time  Yes yes   Sig: Take 300 mg by mouth daily. VIT C/VIT E/LUTEIN/MIN/OMEGA-3 (OCUVITE PO) Unknown at Unknown time  Yes yes   Sig: Take 1 Tab by mouth daily.    acetaminophen (TYLENOL) 325 mg tablet Unknown at Unknown time  No yes   Sig: Take 2 Tabs by mouth every four (4) hours as needed. anastrozole (ARIMIDEX) 1 mg tablet 2018 at Unknown time  No Yes   Sig: Take 1 Tab by mouth daily. ascorbate calcium (MAKAYLA-C) 500 mg Tab 2018 at Unknown time  Yes Yes   Sig: Take 1,000 mg by mouth daily. aspirin delayed-release 81 mg tablet 2018 at Unknown time  Yes Yes   Sig: Take 81 mg by mouth daily. b-complex with vitamin c tablet 2018 at Unknown time  No Yes   Sig: Take 1 Tab by mouth daily. carvedilol (COREG) 6.25 mg tablet 2018 at Unknown time  No Yes   Sig: Take 1 Tab by mouth two (2) times daily (with meals). clopidogrel (PLAVIX) 75 mg tab 2018 at Unknown time  No Yes   Sig: Take 1 Tab by mouth daily. docusate sodium (COLACE) 100 mg capsule 2018 at Unknown time  No Yes   Sig: Take 1 Cap by mouth two (2) times a day for 90 days. epoetin celio (EPOGEN;PROCRIT) 10,000 unit/mL injection 2018 at Unknown time  No yes   Si mL by SubCUTAneous route every Monday, Wednesday, Friday. Indications: ANEMIA DUE TO RENAL FAILURE   etanercept (ENBREL) 50 mg/mL (0.98 mL) injection Unknown at Unknown time  No yes   Si mL by SubCUTAneous route every seven (7) days. evolocumab (REPATHA SURECLICK) pen injection 9929 at Unknown time  No yes   Si mL by SubCUTAneous route every fourteen (14) days. folic acid (FOLVITE) 1 mg tablet Unknown at Unknown time  No yes   Sig: TAKE ONE TABLET BY MOUTH DAILY   furosemide (LASIX) 20 mg tablet 2018 at Unknown time  Yes Yes   Sig: Take 20 mg by mouth daily. glimepiride (AMARYL) 1 mg tablet 2018 at Unknown time  Yes Yes   Sig: Take 1 mg by mouth Daily (before breakfast). insulin glargine (LANTUS) 100 unit/mL injection 2018 at Unknown time  No Yes   Sig: 10 Units by SubCUTAneous route daily. Indications: type 2 diabetes mellitus   levothyroxine (SYNTHROID) 88 mcg tablet 2018 at Unknown time  Yes Yes   Sig: Take 88 mcg by mouth Daily (before breakfast).    methyl salicylate-menthol (BENGAY) 15-10 % topical cream Unknown at Unknown time  No yes   Sig: Apply  to affected area as needed for Pain. APPLY TO right shoulder    Nursing, document site in comments   nitroglycerin (NITROSTAT) 0.4 mg SL tablet Unknown at Unknown time  No yes   Si Tab by SubLINGual route as needed for Chest Pain. Up to 3 doses. ondansetron (ZOFRAN ODT) 4 mg disintegrating tablet Unknown at Unknown time  Yes yes   Sig: Take 4 mg by mouth every four (4) hours as needed for Nausea. palbociclib (IBRANCE) 125 mg cap 2018 at Unknown time  No Yes   Sig: Take 1 Cap by mouth daily (with lunch). Indications: HORMONE RECEPTOR(+), HER2(-) ADVANCED BREAST CANCER   polyethylene glycol (MIRALAX) 17 gram packet Unknown at Unknown time  No yes   Sig: Take 1 Packet by mouth daily. simethicone (MYLICON) 80 mg chewable tablet Unknown at Unknown time  No yes   Sig: Take 1 Tab by mouth as needed. Indications: FLATULENCE      Facility-Administered Medications: None         Comments/Recommendations: Added Zofran 4mg ODT Q4H PRN. Removed Augmentin, Prednisone, Levaquin. Changed Furosemide from 80mg to 20mg. Pt reports receiving Enbrel inj on , however, facility reports pt has not received it.

## 2018-07-02 NOTE — ED PROVIDER NOTES
HPI Comments: 82 y.o. female with past medical history significant for breast cancer with bone mets, RH, diabetes, hypertension and pulmonary edema who presents from [facility]) Convalescence via EMS with chief complaint of shortness of breath. Staff reported to EMS that patient was found with room air sats 64%, up to 98% on CPAP. Per son, patient has a history of frequent admission for pulmonary edema, usually resolves with IV Lasix and and BiPAP. There are no other acute medical concerns at this time. [facility] hx[facility]   PCP: Mine Padilla MD  Cardiologist: Dr. Louis Whitaker    Full history, physical exam, and ROS unable to be obtained due to:  critical illness. Note written by Rusty Denson, as dictated by Micaela Luna MD 9:39 AM    9:43 AM  Per son, patient does not want chest compressions or tracheotomy. The history is provided by the EMS personnel and a relative. The history is limited by the condition of the patient.         Past Medical History:   Diagnosis Date    Anemia 9/2/2009    Colon cancer ([address] 9/2/2009    surgery/chemo    Colon cancer ([address] 9/2/2009    DM (diabetes mellitus) ([address] 9/2/2009    GERD (gastroesophageal reflux disease)     H/O: CVA 9/2/2009    slight l sided weakness    Hypothyroid 9/2/2009    Ill-defined condition     seasonal allergies    Microalbuminuria 9/2/2009    Osteoporosis 9/2/2009    Other and unspecified hyperlipidemia 1/27/2010    Rheumatoid arthritis involving ankle ([address] 9/28/2016    Rheumatoid arthritis(714.0) 9/2/2009    Unspecified essential hypertension 9/2/2009    Weakness due to cerebrovascular accident        Past Surgical History:   Procedure Laterality Date    HX COLECTOMY      HX HYSTERECTOMY      HX OTHER SURGICAL      colonoscopies numerous since 1993         Family History:   Problem Relation Age of Onset    Diabetes Mother     Stroke Mother     Diabetes Sister     Other Sister      fell and hit her head -  of this    Diabetes Brother     Cancer Father      stomach    Diabetes Sister        Social History     Social History    Marital status:      Spouse name: N/A    Number of children: N/A    Years of education: N/A     Occupational History    Not on file. Social History Main Topics    Smoking status: Never Smoker    Smokeless tobacco: Never Used    Alcohol use No    Drug use: No    Sexual activity: Not Currently     Partners: Male     Other Topics Concern    Not on file     Social History Narrative         ALLERGIES: Statins-hmg-coa reductase inhibitors and Sulfa (sulfonamide antibiotics)    Review of Systems   Unable to perform ROS: Acuity of condition   Respiratory: Positive for shortness of breath. Vitals:    18 1017 18 1018 18 1030 18 1031   BP:   104/50    Pulse:  (!) 122 (!) 115    Resp:  (!) 35 (!) 35    Temp:       SpO2: 97% 97% 97% 98%   Weight:       Height:                Physical Exam   Constitutional: She appears well-developed and well-nourished. She appears distressed. HENT:   Head: Normocephalic and atraumatic. Mouth/Throat: Oropharynx is clear and moist.   Eyes: EOM are normal. Pupils are equal, round, and reactive to light. Neck: Normal range of motion. Neck supple. Cardiovascular: Regular rhythm, normal heart sounds and intact distal pulses. Tachycardia present. Exam reveals no gallop and no friction rub. No murmur heard. Pulse rate 140  /105   Pulmonary/Chest: She is in respiratory distress (severe). She has no wheezes. She has no rales. Respiratory rate of 35   Abdominal: Soft. There is no tenderness. There is no rebound. Musculoskeletal: Normal range of motion. She exhibits no tenderness. Neurological: She is alert. No cranial nerve deficit. Motor; symmetric   Skin: No erythema. Nursing note and vitals reviewed. Note written by Aren Menchaca.  Julieta Dong, as dictated by Claudia Hammer MD 9:43 AM MDM  Number of Diagnoses or Management Options  Critical Care  Total time providing critical care: 30-74 minutes (Total critical care time spent exclusive of procedures:  50 minutes  )        ED Course       Procedures      CONSULT NOTE:  9:37 AM Winifred Colon MD spoke with Dr. Sheila Jacob, Consult for Cardiology. Discussed available diagnostic tests and clinical findings. Dr. Sheila Jacob recommends admission to the hospitalist.    CONSULT NOTE:  10:29 AM Winifred Colon MD spoke with Dr. Betti Leyden, Consult for Hospitalist.  Discussed available diagnostic tests and clinical findings. Dr. Betti Leyden will admit.

## 2018-07-02 NOTE — ED TRIAGE NOTES
TRIAGE NOTE: Patient arrived from [facility] with c/o shortness of breath. Room air stats at facility was 64%. Currently CPAP on 98%.

## 2018-07-02 NOTE — ROUTINE PROCESS
TRANSFER - OUT REPORT:    Verbal report given to Chacho Carrasquillo(name) on [address]  being transferred to [facility]) for routine progression of care       Report consisted of patients Situation, Background, Assessment and   Recommendations(SBAR). Information from the following report(s) SBAR, Kardex, ED Summary and MAR was reviewed with the receiving nurse. Lines:   Peripheral IV 07/02/18 Right Antecubital (Active)   Site Assessment Clean, dry, & intact 7/2/2018  9:35 AM   Phlebitis Assessment 0 7/2/2018  9:35 AM   Infiltration Assessment 0 7/2/2018  9:35 AM   Dressing Status Clean, dry, & intact 7/2/2018  9:35 AM   Dressing Type 4 X 4 7/2/2018  9:35 AM   Hub Color/Line Status Blue 7/2/2018  9:35 AM       Peripheral IV 07/02/18 Right Wrist (Active)   Site Assessment Clean, dry, & intact 7/2/2018  9:38 AM   Phlebitis Assessment 0 7/2/2018  9:38 AM   Infiltration Assessment 0 7/2/2018  9:38 AM   Dressing Status Clean, dry, & intact 7/2/2018  9:38 AM   Hub Color/Line Status Blue 7/2/2018  9:38 AM       Peripheral IV 07/02/18 Left Forearm (Active)   Site Assessment Clean, dry, & intact 7/2/2018  9:58 AM   Phlebitis Assessment 0 7/2/2018  9:58 AM   Infiltration Assessment 0 7/2/2018  9:58 AM   Dressing Status Clean, dry, & intact 7/2/2018  9:58 AM   Dressing Type 4 X 4 7/2/2018  9:58 AM        Opportunity for questions and clarification was provided.       Patient transported with:   Monitor  Registered Nurse

## 2018-07-02 NOTE — PROGRESS NOTES
Primary Nurse Meredith Jean RN and Lee Al RN performed a dual skin assessment on this patient Impairment noted- see wound doc flow sheet. Sacral friction/shear. Wound care consulted.

## 2018-07-02 NOTE — CONSULTS
Cardiology Consult Note      Patient Name: Anahy Noel  : 1935 MRN: [id number]  Date: 2018  Time: 11:03 AM    Admit Diagnosis: SOB (shortness of breath)    Primary Cardiologist: Dr. Rain Espino MD   Consulting Cardiologist: Romi Costa III, M.D. Corrine Hey for Consult: SOB, hx of CAD    Requesting MD: Dr. Josh Stevens MD    HPI:  Anahy Noel is a 82 y.o. female admitted on 2018  for SOB (shortness of breath). Well known to cardiology service. Has PMH of CAD/NSTEMI, severe multivessal disease (not candidate for PCI or surgery), HFrEF, CKD, chronic anemia, HTN, Metastatic Left breast CA, DM type II, colon CA. Discharged on  after hospitalization for Acute respiratory failure, elevated troponin, acute on chronic CHF. Discharged to [facility]. Now presents today with chief c/o SOB. SOB started yesterday, son notes that she had a \"wet cough\" yesterday. SOB progressed today, EMS was called , O2 sats were 60% per EMS, brought to ER and pt found to be in pulmonary edema and placed on Bipap, given 80 mg IV lasix. Troponin <0.05.  12 lead with ST with PACs- no acute ischemic changes. Lactic acid elevated at 3.1. No fevers, chills. FH; mother DM and CVA, no FH of early CAD  SH:  Never smoker, No ETOH use. Subjective:  Feeling better/ SOB much better on bipap. Denies chest pain, palpitations. Son at bedside confirms that pt has been wearing bipap machine at night at nursing facility. Assessment and Plan     1. Acute on chronic systolic CHF:  NYHA IV on admission  -TTE 18 EF 30%, NWMA  -Continue Coreg 6.25 mg BID  -No ace-I due to CKD/elevated creatinine  -Lasix 80 mg IV BID- will need to watch kidney function closely. (was discharged on  Lasix 80 mg po daily)\  -Daily weights and I/Os  -continue bipap and would continue daily at HS when no longer needed during day.     2. Hx of CAD/severe multivessal disease  -12 lead EKG:  NSR with PACs, no acute ischemic changes.   -Not candidate for CABG or PCI   -Not on statin due to intolerance  -Continue Repatha    3. Lactic acidosis: lactic acid 3.1.  - Uncertain etiology. No evidence of infection- management/workup per primary team    4. Hx of CKD:  Creatinine 1.31 GFR 39. Stage IIIB  -Was followed by Dr. Nusrat Fuentes group last admission. 5. Hx of HTN;  bp controlled. -Coreg as above    6. Hx of metastatic breast cancer:  Has seen palliative care in past.    7. Hx of DM type II    8. Advanced directives: Palliative care has seen pt in past admission. Has been DNR in past- will d/w primary team.      82 y.o. Female with metastatic breast CA, CHF, CAD/severe multivessel disease now presents again with acute pulmonary edema/acute on chronic systolic CHF. Agree with IV diuresis- will increase dosing today and watch creatinine closely. Cardiology attending: seen and examined. Agree with assess and plan  No obvious cause for rapid decompensation thus far. Chest decreased at bases, cv rrr, ext no edema. Continue usual meds as tolerated, will probably need higher dose daily diuretics at home. Needs no cardiac testing at this time          Review of Symptoms:  Constitutional: Negative for fever, chills,   HEENT: Negative for nosebleeds,  Respiratory: Postive for cough, negative for hemoptysis  Cardiovascular: Negative for chest pain, palpitations, positive orthopnea, negative leg swelling, syncope. Gastrointestinal: Negative for nausea, vomiting, diarrhea, blood in stool and melena,   Genitourinary: Negative for hematuria. Musculoskeletal: Negative for myalgias  Skin: Negative for rash. Heme: No bleeding. Neurological: Negative for speech changes and focal weakness      Previous treatment/evaluation includes echocardiogram and cardiac catheterization .   Cardiac risk factors: dyslipidemia, diabetes mellitus, obesity, hypertension, post-menopausal.    Past Medical History: Diagnosis Date    Anemia 9/2/2009    Colon cancer ([facility] 9/2/2009    surgery/chemo    Colon cancer ([facility] 9/2/2009    DM (diabetes mellitus) ([facility] 9/2/2009    GERD (gastroesophageal reflux disease)     H/O: CVA 9/2/2009    slight l sided weakness    Hypothyroid 9/2/2009    Ill-defined condition     seasonal allergies    Microalbuminuria 9/2/2009    Osteoporosis 9/2/2009    Other and unspecified hyperlipidemia 1/27/2010    Rheumatoid arthritis involving ankle ([facility] 9/28/2016    Rheumatoid arthritis(714.0) 9/2/2009    Unspecified essential hypertension 9/2/2009    Weakness due to cerebrovascular accident      Past Surgical History:   Procedure Laterality Date    HX COLECTOMY      HX HYSTERECTOMY      HX OTHER SURGICAL      colonoscopies numerous since 1993     No current facility-administered medications for this encounter. Current Outpatient Prescriptions   Medication Sig    FORTEO 20 mcg/dose - 600 mcg/2.4 mL pnij injection INJECT 20MCG ONCE DAILY    furosemide (LASIX) 80 mg tablet Take 1 Tab by mouth daily. Indications: Pulmonary Edema due to Chronic Heart Failure    acetaminophen (TYLENOL) 325 mg tablet Take 2 Tabs by mouth every four (4) hours as needed.  b-complex with vitamin c tablet Take 1 Tab by mouth daily.  insulin glargine (LANTUS) 100 unit/mL injection 10 Units by SubCUTAneous route daily. Indications: type 2 diabetes mellitus    levoFLOXacin (LEVAQUIN) 750 mg tablet Take 1 Tab by mouth every fourty-eight (48) hours. Take 1 dose 6/21/18 at 6879    methyl salicylate-menthol (BENGAY) 15-10 % topical cream Apply  to affected area as needed for Pain. APPLY TO right shoulder    Nursing, document site in comments    palbociclib (IBRANCE) 125 mg cap Take 1 Cap by mouth daily (with lunch).  Indications: HORMONE RECEPTOR(+), HER2(-) ADVANCED BREAST CANCER    predniSONE (DELTASONE) 5 mg tablet Take 10mg daily x2 days then 5mg daily x2 days    simethicone (MYLICON) 80 mg chewable tablet Take 1 Tab by mouth as needed. Indications: FLATULENCE    amoxicillin-clavulanate (AUGMENTIN) 500-125 mg per tablet Take 1 Tab by mouth every twelve (12) hours.  nitroglycerin (NITROSTAT) 0.4 mg SL tablet 1 Tab by SubLINGual route as needed for Chest Pain. Up to 3 doses.  clopidogrel (PLAVIX) 75 mg tab Take 1 Tab by mouth daily.  carvedilol (COREG) 6.25 mg tablet Take 1 Tab by mouth two (2) times daily (with meals).  anastrozole (ARIMIDEX) 1 mg tablet Take 1 Tab by mouth daily.  docusate sodium (COLACE) 100 mg capsule Take 1 Cap by mouth two (2) times a day for 90 days.  L. acidoph & paracasei- S therm- Bifido (BERYL-Q/RISAQUAD) 8 billion cell cap cap Take 1 Cap by mouth daily.  polyethylene glycol (MIRALAX) 17 gram packet Take 1 Packet by mouth daily.  epoetin celio (EPOGEN;PROCRIT) 10,000 unit/mL injection 1 mL by SubCUTAneous route every Monday, Wednesday, Friday. Indications: ANEMIA DUE TO RENAL FAILURE    glimepiride (AMARYL) 1 mg tablet Take 1 mg by mouth every morning.  levothyroxine (SYNTHROID) 88 mcg tablet Take 88 mcg by mouth Daily (before breakfast).  etanercept (ENBREL) 50 mg/mL (0.98 mL) injection 1 mL by SubCUTAneous route every seven (7) days.  evolocumab (REPATHA SURECLICK) pen injection 1 mL by SubCUTAneous route every fourteen (14) days.  folic acid (FOLVITE) 1 mg tablet TAKE ONE TABLET BY MOUTH DAILY    VIT C/VIT E/LUTEIN/MIN/OMEGA-3 (OCUVITE PO) Take 1 Tab by mouth daily.  MULTIVITAMIN WITH MINERALS (HAIR,SKIN & NAILS PO) Take 1 Tab by mouth daily.  aspirin delayed-release 81 mg tablet Take 81 mg by mouth daily.  OMEGA-3 FATTY ACIDS/FISH OIL (OMEGA 3 FISH OIL PO) Take 300 mg by mouth daily.  ascorbate calcium (MAKAYLA-C) 500 mg Tab Take 1,000 mg by mouth daily.  CHOLECALCIFEROL, VITAMIN D3, (VITAMIN D-3 PO) Take 1,000 Units by mouth daily.        Allergies   Allergen Reactions    Statins-Hmg-Coa Reductase Inhibitors Other (comments) Intolerant to statins     Sulfa (Sulfonamide Antibiotics) Other (comments)     syncope      Family History   Problem Relation Age of Onset    Diabetes Mother     Stroke Mother     Diabetes Sister     Other Sister      fell and hit her head -  of this   [facility] Diabetes Brother     Cancer Father      stomach    Diabetes Sister       Social History     Social History    Marital status:      Spouse name: N/A    Number of children: N/A    Years of education: N/A     Social History Main Topics    Smoking status: Never Smoker    Smokeless tobacco: Never Used    Alcohol use No    Drug use: No    Sexual activity: Not Currently     Partners: Male     Other Topics Concern    None     Social History Narrative       Objective:    Physical Exam    Vitals:   Vitals:    18 1017 18 1018 18 1030 18 1031   BP:   104/50    Pulse:  (!) 122 (!) 115    Resp:  (!) 35 (!) 35    Temp:       SpO2: 97% 97% 97% 98%   Weight:       Height:           General:    Alert, cooperative, appears stated age. On bipap   Neck:  no JVD   Back:      Lungs:     Diminished lower lung fields bilaterally. +Bipap in place. No respiratory distress on bipap   Heart[de-identified]    Regular rate and rhythm, S1, S2 normal, no murmur, click, rub or gallop. Abdomen:     Soft, non-tender. .   Extremities:   Extremities normal, atraumatic, no cyanosis or edema. Vascular:   Pulses - +PT bilaterally   Skin:   Skin color normal. No rashes or lesions   Neurologic:   Alert, MUJICA       Telemetry: normal sinus rhythm    ECG: NSR with PACs    Data Review:     Radiology: CXR: Small left effusion with left lower lobe atelectasis.  Pulmonary  edema.       Recent Labs      18   0940   TROIQ  <0.05     Recent Labs      18   0940   NA  137   K  4.0   CL  106   CO2  19*   BUN  18   CREA  1.31*   GLU  362*   CA  8.9     Recent Labs      18   0940   WBC  3.5*   HGB  13.1   HCT  43.3   PLT  124*     Recent Labs      18   0940 SGOT  29   AP  747*     No results for input(s): CHOL, LDLC in the last 72 hours. No lab exists for component: TGL, HDLC,  HBA1C  No results for input(s): CRP, TSH, TSHEXT in the last 72 hours. No lab exists for component: ESR        Sandeep Mcguire.  MARIETTA Montana MD           [facility] [address] [place] [address] Rd[address] [facility] [address], [address] [facility] [address]     [facility]     [phone]    Reid Morejon MD

## 2018-07-03 LAB
ANION GAP SERPL CALC-SCNC: 8 MMOL/L (ref 5–15)
BUN SERPL-MCNC: 22 MG/DL (ref 6–20)
BUN/CREAT SERPL: 18 (ref 12–20)
CALCIUM SERPL-MCNC: 8.7 MG/DL (ref 8.5–10.1)
CHLORIDE SERPL-SCNC: 108 MMOL/L (ref 97–108)
CO2 SERPL-SCNC: 24 MMOL/L (ref 21–32)
CREAT SERPL-MCNC: 1.19 MG/DL (ref 0.55–1.02)
ERYTHROCYTE [DISTWIDTH] IN BLOOD BY AUTOMATED COUNT: 22.5 % (ref 11.5–14.5)
GLUCOSE BLD STRIP.AUTO-MCNC: 141 MG/DL (ref 65–100)
GLUCOSE BLD STRIP.AUTO-MCNC: 181 MG/DL (ref 65–100)
GLUCOSE BLD STRIP.AUTO-MCNC: 188 MG/DL (ref 65–100)
GLUCOSE BLD STRIP.AUTO-MCNC: 248 MG/DL (ref 65–100)
GLUCOSE BLD STRIP.AUTO-MCNC: 77 MG/DL (ref 65–100)
GLUCOSE BLD STRIP.AUTO-MCNC: 79 MG/DL (ref 65–100)
GLUCOSE SERPL-MCNC: 73 MG/DL (ref 65–100)
HCT VFR BLD AUTO: 33.5 % (ref 35–47)
HGB BLD-MCNC: 10.3 G/DL (ref 11.5–16)
MAGNESIUM SERPL-MCNC: 1.7 MG/DL (ref 1.6–2.4)
MCH RBC QN AUTO: 28.1 PG (ref 26–34)
MCHC RBC AUTO-ENTMCNC: 30.7 G/DL (ref 30–36.5)
MCV RBC AUTO: 91.5 FL (ref 80–99)
NRBC # BLD: 0 K/UL (ref 0–0.01)
NRBC BLD-RTO: 0 PER 100 WBC
PLATELET # BLD AUTO: 93 K/UL (ref 150–400)
PMV BLD AUTO: 11.1 FL (ref 8.9–12.9)
POTASSIUM SERPL-SCNC: 3.7 MMOL/L (ref 3.5–5.1)
RBC # BLD AUTO: 3.66 M/UL (ref 3.8–5.2)
SERVICE CMNT-IMP: ABNORMAL
SERVICE CMNT-IMP: NORMAL
SERVICE CMNT-IMP: NORMAL
SODIUM SERPL-SCNC: 140 MMOL/L (ref 136–145)
WBC # BLD AUTO: 2.5 K/UL (ref 3.6–11)

## 2018-07-03 PROCEDURE — 51798 US URINE CAPACITY MEASURE: CPT

## 2018-07-03 PROCEDURE — 80048 BASIC METABOLIC PNL TOTAL CA: CPT | Performed by: FAMILY MEDICINE

## 2018-07-03 PROCEDURE — 94660 CPAP INITIATION&MGMT: CPT

## 2018-07-03 PROCEDURE — 65660000001 HC RM ICU INTERMED STEPDOWN

## 2018-07-03 PROCEDURE — 77010033678 HC OXYGEN DAILY

## 2018-07-03 PROCEDURE — 74011250637 HC RX REV CODE- 250/637: Performed by: NURSE PRACTITIONER

## 2018-07-03 PROCEDURE — 36415 COLL VENOUS BLD VENIPUNCTURE: CPT | Performed by: FAMILY MEDICINE

## 2018-07-03 PROCEDURE — 97161 PT EVAL LOW COMPLEX 20 MIN: CPT

## 2018-07-03 PROCEDURE — 74011250637 HC RX REV CODE- 250/637: Performed by: FAMILY MEDICINE

## 2018-07-03 PROCEDURE — 83735 ASSAY OF MAGNESIUM: CPT | Performed by: NURSE PRACTITIONER

## 2018-07-03 PROCEDURE — 74011000250 HC RX REV CODE- 250: Performed by: FAMILY MEDICINE

## 2018-07-03 PROCEDURE — 94762 N-INVAS EAR/PLS OXIMTRY CONT: CPT

## 2018-07-03 PROCEDURE — 97535 SELF CARE MNGMENT TRAINING: CPT

## 2018-07-03 PROCEDURE — 74011636637 HC RX REV CODE- 636/637: Performed by: FAMILY MEDICINE

## 2018-07-03 PROCEDURE — 82962 GLUCOSE BLOOD TEST: CPT

## 2018-07-03 PROCEDURE — 97116 GAIT TRAINING THERAPY: CPT

## 2018-07-03 PROCEDURE — 85027 COMPLETE CBC AUTOMATED: CPT | Performed by: FAMILY MEDICINE

## 2018-07-03 PROCEDURE — 97165 OT EVAL LOW COMPLEX 30 MIN: CPT

## 2018-07-03 PROCEDURE — 74011250636 HC RX REV CODE- 250/636: Performed by: NURSE PRACTITIONER

## 2018-07-03 RX ORDER — FUROSEMIDE 40 MG/1
80 TABLET ORAL DAILY
Status: DISCONTINUED | OUTPATIENT
Start: 2018-07-03 | End: 2018-07-06 | Stop reason: HOSPADM

## 2018-07-03 RX ORDER — MAGNESIUM SULFATE 1 G/100ML
1 INJECTION INTRAVENOUS ONCE
Status: COMPLETED | OUTPATIENT
Start: 2018-07-03 | End: 2018-07-04

## 2018-07-03 RX ADMIN — FUROSEMIDE 80 MG: 40 TABLET ORAL at 09:12

## 2018-07-03 RX ADMIN — Medication 10 ML: at 22:00

## 2018-07-03 RX ADMIN — CARVEDILOL 6.25 MG: 6.25 TABLET, FILM COATED ORAL at 09:12

## 2018-07-03 RX ADMIN — CARVEDILOL 6.25 MG: 6.25 TABLET, FILM COATED ORAL at 16:39

## 2018-07-03 RX ADMIN — DOCUSATE SODIUM 100 MG: 100 CAPSULE, LIQUID FILLED ORAL at 16:39

## 2018-07-03 RX ADMIN — ASPIRIN 81 MG: 81 TABLET, COATED ORAL at 09:12

## 2018-07-03 RX ADMIN — INSULIN GLARGINE 10 UNITS: 100 INJECTION, SOLUTION SUBCUTANEOUS at 09:12

## 2018-07-03 RX ADMIN — CLOPIDOGREL BISULFATE 75 MG: 75 TABLET ORAL at 09:12

## 2018-07-03 RX ADMIN — Medication 10 ML: at 13:58

## 2018-07-03 RX ADMIN — DEXTROSE MONOHYDRATE 25 G: 25 INJECTION, SOLUTION INTRAVENOUS at 07:06

## 2018-07-03 RX ADMIN — MAGNESIUM SULFATE IN DEXTROSE 1 G: 10 INJECTION, SOLUTION INTRAVENOUS at 16:39

## 2018-07-03 RX ADMIN — FOLIC ACID 1 MG: 1 TABLET ORAL at 09:12

## 2018-07-03 RX ADMIN — Medication 10 ML: at 06:40

## 2018-07-03 RX ADMIN — LEVOTHYROXINE SODIUM 88 MCG: 88 TABLET ORAL at 06:40

## 2018-07-03 RX ADMIN — DOCUSATE SODIUM 100 MG: 100 CAPSULE, LIQUID FILLED ORAL at 09:12

## 2018-07-03 NOTE — PROGRESS NOTES
participated in [facility] rounds this am.  Patient has been a re-admit x4 over the last six months. Patient had been at [facility] for the last few weeks per my conversation with her on son Vipin Loja who asked me to call his other brother Hira Jacobs cell [phone]. I note that patient has a private care aide with her in the room. Patient has a long medical history and has had breast cancer and colon cancer. Patient had some palpatations yesterday and was congested and PO2 dropped. Patient has chronic systolic CHF. I did leave a message for son to call me this pm to discuss a realistic discharge plan that would provide comfort to patient.

## 2018-07-03 NOTE — PROGRESS NOTES
0650 BS 79. Hypoglycemia protocol followed. Rechecked . Problem: Pressure Injury - Risk of  Goal: *Prevention of pressure injury  Document Calderon Scale and appropriate interventions in the flowsheet. Outcome: Progressing Towards Goal  Moisture barrier applied, heels elevated, pillows and blankets used, pressure redistributing mattress, linens dry. Problem: Falls - Risk of  Goal: *Absence of Falls  Document Yaron Fall Risk and appropriate interventions in the flowsheet. Outcome: Progressing Towards Goal  Pt remains free of falls during admission. Call bell and frequently used items within reach. Bedside table within reach. Patient provided non skid socks and instructed to call out for nurse when in need of assistance. Problem: Patient Education: Go to Patient Education Activity  Goal: Patient/Family Education  Outcome: Progressing Towards Goal  Pt educated on fall prevention. Pt demonstrates appropriate understanding. Pt is oriented and calls out appropriately for assistance ambulating. Purposeful hourly rounds initiated by staff.

## 2018-07-03 NOTE — PROGRESS NOTES
Spiritual Care Partner Volunteer visited patient in room 403/01on 7.03.18. Documented by: : Rev. Shirin Cisneros; [facility], to contact [id number] [address]one call: [phone]

## 2018-07-03 NOTE — PROGRESS NOTES
Problem: Mobility Impaired (Adult and Pediatric)  Goal: *Acute Goals and Plan of Care (Insert Text)  Physical Therapy Goals  Initiated 7/3/2018  1. Patient will move from supine to sit and sit to supine  and roll side to side in bed with independence within 7 day(s). 2.  Patient will transfer from bed to chair and chair to bed with independence using the least restrictive device within 7 day(s). 3.  Patient will perform sit to stand with independence within 7 day(s). 4.  Patient will ambulate with modified independence for 150 feet with the least restrictive device within 7 day(s). 5.  Patient will ascend/descend 5 stairs with 0 handrail(s) with minimal assistance/contact guard assist within 7 day(s). physical Therapy EVALUATION  Patient: Zara Peñaloza (82 y.o. female)  Date: 7/3/2018  Primary Diagnosis: SOB (shortness of breath)        Precautions:   Fall    ASSESSMENT :  Based on the objective data described below, the patient presents with impaired safety awareness, decreased balance, and endurance following admission for SOB. Prior to admission this patient was modified independent with a rollator? Pt was A&O x 4 and stated she lived at home(caregiver in room also agreeing). However after speaking with RN and CM pt was from a SNF and has not been home for nearly 3 months. Per pt and caregiver she was living at home, has assist from 3 sons and AM/PM caregivers. She had no complaint of pain today and no REDDY noted or reported by patient maintained saturations 94-97% with mobility after taking off NC which was previously on 4L/min. Safety concerns during gait and mobility in the room d/t decreased step clearance, trunk sway and shuffling gait. Unable to find RW and thus used bilateral HHA. Recommend discharge to a rehab setting when medically stable vs home with HHPT/OT and care aides.  She will need 24/7 supervision if she is to return home and if family can provide this it is a safe alternative to SNF.      Patient will benefit from skilled intervention to address the above impairments. Patients rehabilitation potential is considered to be Good  Factors which may influence rehabilitation potential include:   []         None noted  []         Mental ability/status  []         Medical condition  [x]         Home/family situation and support systems  [x]         Safety awareness  []         Pain tolerance/management  []         Other:      PLAN :  Recommendations and Planned Interventions:  [x]           Bed Mobility Training             [x]    Neuromuscular Re-Education  [x]           Transfer Training                   []    Orthotic/Prosthetic Training  [x]           Gait Training                         []    Modalities  [x]           Therapeutic Exercises           []    Edema Management/Control  [x]           Therapeutic Activities            [x]    Patient and Family Training/Education  []           Other (comment):    Frequency/Duration: Patient will be followed by physical therapy  5 times a week to address goals. Discharge Recommendations: Home Health and [address] [place] [address] Recommendations for Discharge: None     SUBJECTIVE:   Patient stated [facility] house has 5 steps without rails.     OBJECTIVE DATA SUMMARY:   HISTORY:    Past Medical History:   Diagnosis Date    Anemia 9/2/2009    Colon cancer ([facility] 9/2/2009    surgery/chemo    Colon cancer ([facility] 9/2/2009    DM (diabetes mellitus) ([facility] 9/2/2009    GERD (gastroesophageal reflux disease)     H/O: CVA 9/2/2009    slight l sided weakness    Hypothyroid 9/2/2009    Ill-defined condition     seasonal allergies    Microalbuminuria 9/2/2009    Osteoporosis 9/2/2009    Other and unspecified hyperlipidemia 1/27/2010    Rheumat[facility] ([facility] 9/28/2016    Rheumatoid arthritis(714.0) 9/2/2009    Unspecified essential hypertension 9/2/2009    Weakness due to cerebrovascular accident      Past Surgical History:   Procedure Laterality Date    HX COLECTOMY      HX HYSTERECTOMY      HX OTHER SURGICAL      colonoscopies numerous since 1993     Prior Level of Function/Home Situation: Per RN/CM pt from SNF. Per pt she was from home and has aides in the AM and PM. Uses a rollator  Personal factors and/or comorbidities impacting plan of care: safety? Home Situation  Home Environment: Private residence  # Steps to Enter: 5  Rails to Enter: No  One/Two Story Residence: Two story, live on 1st floor  Living Alone: No  Support Systems: Family member(s), Home care staff (Mon Fri- 5-1, 3-8 care aides)  Patient Expects to be Discharged to[de-identified] Private residence  Current DME Used/Available at Home: [employer] [place], rollator, Transfer bench, Grab bars  Tub or Shower Type: Shower    EXAMINATION/PRESENTATION/DECISION MAKING:   Critical Behavior:  Neurologic State: Alert  Orientation Level: Oriented to person, Oriented to time  Cognition: Follows commands  Safety/Judgement: Awareness of environment, Fall prevention  Hearing: Auditory  Auditory Impairment: Hard of hearing, bilateral, Hearing aid(s)  Skin:    Edema:   Range Of Motion:  AROM: Generally decreased, functional           PROM: Generally decreased, functional           Strength:    Strength: Generally decreased, functional                    Tone & Sensation:   Tone: Normal              Sensation: Intact               Coordination:  Coordination: Within functional limits  Vision:   Acuity: Able to read clock/calendar on wall without difficulty; Able to read employee name badge without difficulty  Functional Mobility:  Bed Mobility:     Supine to Sit: Minimum assistance; Additional time (HOB elevated)  Sit to Supine: Minimum assistance; Additional time (to lift LEs)     Transfers:  Sit to Stand: Minimum assistance;Assist x2; Additional time  Stand to Sit: Contact guard assistance                       Balance:   Sitting: Intact  Standing: Impaired; With support  Standing - Static: Good  Standing - Dynamic : Fair  Ambulation/Gait Training:  Distance (ft): 65 Feet (ft)  Assistive Device: Gait belt (HHA x 2)  Ambulation - Level of Assistance: Minimal assistance;Assist x2        Gait Abnormalities: Decreased step clearance;Trunk sway increased; Shuffling gait        Base of Support: Narrowed; Center of gravity altered     Speed/Haylee: Pace decreased (<100 feet/min); Slow  Step Length: Right shortened;Left shortened                     Stairs: Therapeutic Exercises:       Functional Measure:  Barthel Index:    Bathin  Bladder: 5  Bowels: 5  Groomin  Dressin  Feedin  Mobility: 0  Stairs: 0  Toilet Use: 5  Transfer (Bed to Chair and Back): 5  Total: 30       Barthel and G-code impairment scale:  Percentage of impairment CH  0% CI  1-19% CJ  20-39% CK  40-59% CL  60-79% CM  80-99% CN  100%   Barthel Score 0-100 100 99-80 79-60 59-40 20-39 1-19   0   Barthel Score 0-20 20 17-19 13-16 9-12 5-8 1-4 0      The Barthel ADL Index: Guidelines  1. The index should be used as a record of what a patient does, not as a record of what a patient could do. 2. The main aim is to establish degree of independence from any help, physical or verbal, however minor and for whatever reason. 3. The need for supervision renders the patient not independent. 4. A patient's performance should be established using the best available evidence. Asking the patient, friends/relatives and nurses are the usual sources, but direct observation and common sense are also important. However direct testing is not needed. 5. Usually the patient's performance over the preceding 24-48 hours is important, but occasionally longer periods will be relevant. 6. Middle categories imply that the patient supplies over 50 per cent of the effort. 7. Use of aids to be independent is allowed. Manuel Calabrese., Barthel, D.W. (0168). Functional evaluation: the Barthel Index. [facility] (14)2.   TIAGO Johnson, Deborah Guillen, Jadyn Thomas., Grey Bravo (1999). Measuring the change indisability after inpatient rehabilitation; comparison of the responsiveness of the Barthel Index and Functional [place] Measure. Journal of Neurology, Neurosurgery, and Psychiatry, 66(4), 370-388. MYRA Holley, ANDREA Ramos, & Osvaldo Brower M.A. (2004.) Assessment of post-stroke quality of life in cost-effectiveness studies: The usefulness of the Barthel Index and the EuroQoL-5D. Quality of Life Research, 13, 919-19         G codes: In compliance with CMSs Claims Based Outcome Reporting, the following G-code set was chosen for this patient based on their primary functional limitation being treated: The outcome measure chosen to determine the severity of the functional limitation was the Barthel with a score of 30/100 which was correlated with the impairment scale. ? Mobility - Walking and Moving Around:     - CURRENT STATUS: CL - 60%-79% impaired, limited or restricted    - GOAL STATUS: CK - 40%-59% impaired, limited or restricted    - D/C STATUS:  ---------------To be determined---------------      Physical Therapy Evaluation Charge Determination   History Examination Presentation Decision-Making   MEDIUM  Complexity : 1-2 comorbidities / personal factors will impact the outcome/ POC  MEDIUM Complexity : 3 Standardized tests and measures addressing body structure, function, activity limitation and / or participation in recreation  LOW Complexity : Stable, uncomplicated  Other outcome measures Barthel  LOW       Based on the above components, the patient evaluation is determined to be of the following complexity level: LOW     Pain:  Pain Scale 1: Numeric (0 - 10)  Pain Intensity 1: 0              Activity Tolerance:   Good  Please refer to the flowsheet for vital signs taken during this treatment.   After treatment:   []         Patient left in no apparent distress sitting up in chair  [x] Patient left in no apparent distress in bed  [x]         Call bell left within reach  [x]         Nursing notified  [x]         Caregiver present  [x]         Bed alarm activated    COMMUNICATION/EDUCATION:   The patients plan of care was discussed with: Occupational Therapist and Registered Nurse. [x]         Fall prevention education was provided and the patient/caregiver indicated understanding. [x]         Patient/family have participated as able in goal setting and plan of care. [x]         Patient/family agree to work toward stated goals and plan of care. []         Patient understands intent and goals of therapy, but is neutral about his/her participation. []         Patient is unable to participate in goal setting and plan of care.     Thank you for this referral.  Jr Giles, PT   Time Calculation: 26 mins

## 2018-07-03 NOTE — WOUND CARE
WOCN Note:       New consult placed by RN for sacral wound    Chart shows:  Admitted for SOB from [place] [address] Home/RA sat= 64% and with CPAP= 98%. PMH: breast Ca and bone mets. WBC = 2.5  On 7-3-18    Assessment:   Patient is A&O x , communicative, incontinent and mobile 2 assists in repositioning 1 assist to turn  for assessment. Patient wearing briefs for incontinence. Bed: [facility] DR VICKI RIVERA and patient turns with 1 assist for assessment. Diet: cardiac regular  Patient reports no pain/ ARIANA Carrasquillo  Bilateral heel,clear buttocks, see notes and sacral see notes. Skin intact and without erythema. Heels offloaded on pillow. 1. POA: Left buttock: 100% pink area with scant clear drainage: 1.1cm x 0.7cm x 0.1cm : edges intact and surrounding skin with slight induration. No warmth or erythema. Secondary to incontinence. 2. POA: gluteal cleft secondary to moisture and incontinence, minute area 0.4cm x 0.3cm x 0.1cm , not draining and surrounding skin is intact. Base: 100% pink. Patient repositioned on right  side with pillow between the knees and heels floating. Recommendations:    - every 3 days: apply liquid marathon to left buttock and gluteal cleft: cover with mepilex bordered dressing. Minimize layers of linen/pads under patient to optimize support surface. Turn/reposition approximately every 2 hours and offload heels. Manage incontinence / promote continence; Aloe Vesta to buttocks and sacrum daily and as needed with incontinence care/ mindful of dressing. Specialty bed: on [facility] Bed. Discussed above plan with patient & RN, Saintclair Mealy who was available for assessment.     Transition of Care: Plan to follow weekly and as needed while admitted to hospital.    Savanah Schmidt RN BSN  Wound Care Department  Office: [phone]  Pager: [phone]

## 2018-07-03 NOTE — PROGRESS NOTES
Reason for Admission:                  RRAT Score:                  Resources/supports as identified by patient/family:                   Top Challenges facing patient (as identified by patient/family and CM): Finances/Medication cost?                    Transportation? Support system or lack thereof? Living arrangements? Self-care/ADLs/Cognition?             Current Advanced Directive/Advance Care Plan:                            Plan for utilizing home health:                          Likelihood of readmission:                  Transition of Care Plan:

## 2018-07-03 NOTE — CDMP QUERY
Thank you the documentation of Congestive heart failure.   Based on the need for increased specificity please include the following components in your documentation regarding:  CHF     Documentation for heart failure must:      Specify Acuity :   Acute, Chronic, acute on chronic     Identify Type:    Systolic, Diastolic, Combined systolic and diastolic failure      List the relationship of HTN to Heart Failure     Identify the underlying cause    Thank you,    Jairo Power RN, BSN, [facility], [facility]  [phone]

## 2018-07-03 NOTE — CDMP QUERY
Please give the clinical significance of the following lab data. Troponin level has risen since admission:  <0.05 (07/02) then up to 0.11 and 07/03, at 0.23. Please clarify and document your clinical opinion in the progress notes and discharge summary including the definitive and/or presumptive diagnosis, (suspected or probable), related to the above clinical findings. Please include clinical findings supporting your diagnosis.     Thank you,    Tomasa Fuller RN, BSN, [facility], [address] [place]  [phone]

## 2018-07-03 NOTE — PROGRESS NOTES
Reason for Readmission:     Patient experienced some difficulty with breathing yestereday while at [facility]. RRAT Score and Risk Level62:          Level of Readmission:    High with 4 visits in 6 months      Care Conference scheduled:   Rounds with staff care mgr and physician       Resources/supports as identified by patient/family:   Discussed acute rehab versus skilled care and rehab with son and patient's nurse. Son was insistent about mom going to a new skilled nursing center to receive PT and OT. Pt has caretakers with her at home and she now  Has one here at hospital.  I also gave brochure about [address] facing patient (as identified by patient/family and CM): Finances/Medication cost?    PATIENT HAS A MEDICARE ADVANTAGE POLICY She also has hired private care aides. Transportation      Has used son for transport to [place] and tests  Support system or lack thereof? Sons are very supportive  However mom has multiple co-morbidiites     Living arrangements? Son would like for mom to be able to return home  Self-care/ADLs/Cognition? Patient's ability to tolerated navigation          Current Advanced Directive/Advance Care Plan:             Plan for utilizing home health:                Likelihood of additional readmission:                Transition of Care Plan:    Based on readmission, the patient's previous Plan of Care   has been evaluated and/or modified.  The current Transition of Care Plan is:

## 2018-07-03 NOTE — PROGRESS NOTES
Cardiology Progress Note            Admit Date: 7/2/2018  Admit Diagnosis: SOB (shortness of breath)  Date: 7/3/2018     Time: 12:16 PM    Subjective:  Denies chest pain, palpitations, SOB. Feeling better today. Wore Bipap last night. Per ER pharmacy- pt was not taking 80 mg po lasix as prescribed at discharge on 6/12- was taking 20 mg Lasix po daily at SNF. Son at bedside- update given. Son considering taking pt home with home health. Requesting PT eval.     Assessment and Plan   1. Acute on chronic systolic CHF:  NYHA IV on admission  -TTE 6/1/18 EF 30%, NWMA  -Continue Coreg 6.25 mg BID  -No ace-I due to CKD/elevated creatinine  -Change lasix to 80 mg po daily.   -Daily weights and I/Os  -continue bipap at HS     2. Hx of CAD/severe multivessal disease  -12 lead EKG:  NSR with PACs, no acute ischemic changes.   -Not candidate for CABG or PCI   -Not on statin due to intolerance  -Continue Repatha  -trivial troponin elevation (0.23) this admission likely from demand ischemia- not NSTEMI. No need for further troponins.     3. Lactic acidosis: resolved     4. Hx of CKD:  Creatinine improved to 1.19 GFR 44. Stage III     5. Hx of HTN;  bp controlled. -Coreg as above     6. Hx of metastatic breast cancer:  Has seen palliative care in past.     7. Hx of DM type II    8. Anemia, chronic disease: hgb 10.3     9. Advanced directives: Palliative care has seen pt in past admission. Currently partial code- no compressions, no intubation.        Breathing improved after IV diuresis. Will change to po Lasix today. Cardiology attending: seen and examined. Agree with assess and plan  Marked improvement.  Rales now on exam. Higher dose diuretics for now and watch bp/renal function      Objective:      Physical Exam:                Visit Vitals    /47 (BP 1 Location: Left arm, BP Patient Position: At rest)    Pulse 84    Temp 97.7 °F (36.5 °C)    Resp 18    Ht 5' 1\" (1.549 m)    Wt 59.6 kg (131 lb 6.3 oz)    SpO2 100%    BMI 24.83 kg/m2          General Appearance:   Well developed, well nourished, alert, individual in no acute distress. Ears/Nose/Mouth/Throat:    Hearing grossly normal.         Neck:  Supple. Chest:    Lungs with bibasilar crackles. On O2 NC   Cardiovascular:   Regular rate and rhythm, S1, S2 normal, no murmur. Abdomen:    Soft, non-tender, non-distended. Extremities:  No edema bilaterally. Skin:  Warm and dry.      Telemetry: normal sinus rhythm with PACs          Data Review:    Labs:    Recent Results (from the past 24 hour(s))   LACTIC ACID    Collection Time: 07/02/18  1:01 PM   Result Value Ref Range    Lactic acid 1.6 0.4 - 2.0 MMOL/L   TROPONIN I    Collection Time: 07/02/18  1:09 PM   Result Value Ref Range    Troponin-I, Qt. 0.11 (H) <0.05 ng/mL   GLUCOSE, POC    Collection Time: 07/02/18  1:50 PM   Result Value Ref Range    Glucose (POC) 268 (H) 65 - 100 mg/dL    Performed by LINNEA CARRINGTON(CON)    URINALYSIS W/MICROSCOPIC    Collection Time: 07/02/18  4:04 PM   Result Value Ref Range    Color YELLOW/STRAW      Appearance CLEAR CLEAR      Specific gravity 1.009 1.003 - 1.030      pH (UA) 6.0 5.0 - 8.0      Protein 30 (A) NEG mg/dL    Glucose NEGATIVE  NEG mg/dL    Ketone NEGATIVE  NEG mg/dL    Bilirubin NEGATIVE  NEG      Blood SMALL (A) NEG      Urobilinogen 0.2 0.2 - 1.0 EU/dL    Nitrites NEGATIVE  NEG      Leukocyte Esterase MODERATE (A) NEG      WBC 10-20 0 - 4 /hpf    RBC 5-10 0 - 5 /hpf    Epithelial cells FEW FEW /lpf    Bacteria NEGATIVE  NEG /hpf    Hyaline cast 0-2 0 - 5 /lpf   LACTIC ACID    Collection Time: 07/02/18  4:04 PM   Result Value Ref Range    Lactic acid 1.4 0.4 - 2.0 MMOL/L   TROPONIN I    Collection Time: 07/02/18  4:04 PM   Result Value Ref Range    Troponin-I, Qt. 0.23 (H) <0.05 ng/mL   GLUCOSE, POC    Collection Time: 07/02/18  5:36 PM   Result Value Ref Range    Glucose (POC) 179 (H) 65 - 100 mg/dL    Performed by Tk Innocent, POC    Collection Time: 07/02/18 11:54 PM   Result Value Ref Range    Glucose (POC) 99 65 - 100 mg/dL    Performed by [address] [place] [address]    Collection Time: 07/03/18  3:58 AM   Result Value Ref Range    Sodium 140 136 - 145 mmol/L    Potassium 3.7 3.5 - 5.1 mmol/L    Chloride 108 97 - 108 mmol/L    CO2 24 21 - 32 mmol/L    Anion gap 8 5 - 15 mmol/L    Glucose 73 65 - 100 mg/dL    BUN 22 (H) 6 - 20 MG/DL    Creatinine 1.19 (H) 0.55 - 1.02 MG/DL    BUN/Creatinine ratio 18 12 - 20      GFR est AA 53 (L) >60 ml/min/1.73m2    GFR est non-AA 44 (L) >60 ml/min/1.73m2    Calcium 8.7 8.5 - 10.1 MG/DL   CBC W/O DIFF    Collection Time: 07/03/18  3:58 AM   Result Value Ref Range    WBC 2.5 (L) 3.6 - 11.0 K/uL    RBC 3.66 (L) 3.80 - 5.20 M/uL    HGB 10.3 (L) 11.5 - 16.0 g/dL    HCT 33.5 (L) 35.0 - 47.0 %    MCV 91.5 80.0 - 99.0 FL    MCH 28.1 26.0 - 34.0 PG    MCHC 30.7 30.0 - 36.5 g/dL    RDW 22.5 (H) 11.5 - 14.5 %    PLATELET 93 (L) 155 - 400 K/uL    MPV 11.1 8.9 - 12.9 FL    NRBC 0.0 0  WBC    ABSOLUTE NRBC 0.00 0.00 - 0.01 K/uL   MAGNESIUM    Collection Time: 07/03/18  3:58 AM   Result Value Ref Range    Magnesium 1.7 1.6 - 2.4 mg/dL   GLUCOSE, POC    Collection Time: 07/03/18  6:50 AM   Result Value Ref Range    Glucose (POC) 79 65 - 100 mg/dL    Performed by Mana Kessler    GLUCOSE, POC    Collection Time: 07/03/18  7:04 AM   Result Value Ref Range    Glucose (POC) 77 65 - 100 mg/dL    Performed by Dorethea Lobstein    GLUCOSE, POC    Collection Time: 07/03/18  7:24 AM   Result Value Ref Range    Glucose (POC) 141 (H) 65 - 100 mg/dL    Performed by Mana Kessler           Radiology:        Current Facility-Administered Medications   Medication Dose Route Frequency    furosemide (LASIX) tablet 80 mg  80 mg Oral DAILY    anastrozole (ARIMIDEX) tablet 1 mg  1 mg Oral DAILY    aspirin delayed-release tablet 81 mg  81 mg Oral DAILY  carvedilol (COREG) tablet 6.25 mg  6.25 mg Oral BID WITH MEALS    clopidogrel (PLAVIX) tablet 75 mg  75 mg Oral DAILY    docusate sodium (COLACE) capsule 100 mg  100 mg Oral BID    folic acid (FOLVITE) tablet 1 mg  1 mg Oral DAILY    insulin glargine (LANTUS) injection 10 Units  10 Units SubCUTAneous DAILY    levothyroxine (SYNTHROID) tablet 88 mcg  88 mcg Oral ACB    nitroglycerin (NITROSTAT) tablet 0.4 mg  0.4 mg SubLINGual PRN    sodium chloride (NS) flush 5-10 mL  5-10 mL IntraVENous Q8H    sodium chloride (NS) flush 5-10 mL  5-10 mL IntraVENous PRN    glucose chewable tablet 16 g  4 Tab Oral PRN    dextrose (D50W) injection syrg 12.5-25 g  12.5-25 g IntraVENous PRN    glucagon (GLUCAGEN) injection 1 mg  1 mg IntraMUSCular PRN    insulin lispro (HUMALOG) injection   SubCUTAneous Q6H          Isi Fernandez.  MARIETTA Vasquez MD       [facility] [address] [place] [address] [address]   [facility]   [phone]

## 2018-07-03 NOTE — PROGRESS NOTES
Hospitalist Progress Note  Devon Ugarte MD  Answering service: [phone] from in house phone  Cell: [phone]      Date of Service:  7/3/2018  NAME:  Jason Anderson  :  1935  MRN:  [id number]      Admission Summary:     Ms. Anirudh Taylor is an 44-year-old female with past medical history of chronic respiratory failure, recurrent episodes of CHF exacerbation and pulmonary edema, history of coronary artery disease with multiple episodes of elevated troponin, history of severe multivessel disease, history of CKD stage III, anemia of chronic disease, metastatic left breast cancer, hypertension and diabetes, who presents to the hospital with the above-mentioned symptoms. The patient reports that she was recently admitted in  with shortness of breath secondary to \"fluid in her lungs. \" The patient reports that there was concern that she may have had an infection and was started on antibiotics. Regardless, the patient reports that she lives at [facility], and started having some shortness of breath yesterday. She reports that today, the shortness of breath got worse. EMS was called, and the patient was found to have a pulse oximetry of 64% on room air. EMS put the patient on CPAP and the patient's saturations got better. Interval history / Subjective:        Patient is now off BiPAP on NC. Stating she feels better; SOB improved. Stating she does not want to go back to the same facility. No pain, dizziness.     Assessment & Plan:     Shortness of breath, secondary to acute on chronic hypoxic respiratory failure, presumed to be secondary to acute on chronic CHF exacerbation, NYHA IV on admission:  -patient currently requiring [facility] level care  -now off of BiPAP on NC  -continue IV diuretics  -recent echocardiogram done on 2018, which showed systolic function that was markedly to moderately reduced, ejection fraction of 30% and a small pericardial effusion. -continue the patient on aspirin and Plavix  -Cardiology is consulted  -Strict ins and outs, daily weights     Elevated troponin to 0.23. Patient without CP, but troponins have trended up.  -continue to trend until peak   -Cardiology consulted    Diabetes mellitus type 2: sliding scale, Accu-Cheks. Lactic acidosis, unclear etiology. Resolved. Chronic kidney disease stage III, stable. History of breast cancer, stage IV with metastasis.       Code status: Partial  DVT prophylaxis: SCDs    Care Plan discussed with: Patient/Family. Aide, Nurse  Disposition: TBD     Hospital Problems  Date Reviewed: 7/2/2018          Codes Class Noted POA    * (Principal)SOB (shortness of breath) ICD-10-CM: R06.02  ICD-9-CM: 786.05  4/16/2018 Unknown              Review of Systems:   Pertinent items are noted in HPI. Vital Signs:    Last 24hrs VS reviewed since prior progress note. Most recent are:  Visit Vitals    BP (!) 144/35 (BP 1 Location: Left arm, BP Patient Position: At rest)    Pulse 81    Temp 98.1 °F (36.7 °C)    Resp 18    Ht 5' 1\" (1.549 m)    Wt 59.6 kg (131 lb 6.3 oz)    SpO2 100%    BMI 24.83 kg/m2         Intake/Output Summary (Last 24 hours) at 07/03/18 1149  Last data filed at 07/03/18 0655   Gross per 24 hour   Intake                0 ml   Output              350 ml   Net             -350 ml        Physical Examination:         Constitutional:  No acute distress, cooperative   ENT:  Neck supple    Resp:  Decreased breath sounds bilaterally   CV:  Regular rhythm, normal rate, normal S1 and S2    GI:  Soft, non distended, non tender, normoactive bowel sounds    Musculoskeletal:  No edema, warm    Neurologic:  Moves all extremities.   AAOx3       Data Review:    Review and/or order of clinical lab test  Review and/or order of tests in the medicine section of CPT    Labs:     Recent Labs      07/03/18   0358  07/02/18   0940   WBC  2.5*  3.5*   HGB  10.3*  13.1   HCT 33.5*  43.3   PLT  93*  124*     Recent Labs      07/03/18   0358  07/02/18   0940   NA  140  137   K  3.7  4.0   CL  108  106   CO2  24  19*   BUN  22*  18   CREA  1.19*  1.31*   GLU  73  362*   CA  8.7  8.9   MG  1.7   --      Recent Labs      07/02/18   0940   SGOT  29   ALT  21   AP  747*   TBILI  0.5   TP  8.4*   ALB  2.8*   GLOB  5.6*     No results for input(s): INR, PTP, APTT in the last 72 hours. No lab exists for component: INREXT   No results for input(s): FE, TIBC, PSAT, FERR in the last 72 hours. No results found for: FOL, RBCF   No results for input(s): PH, PCO2, PO2 in the last 72 hours.   Recent Labs      07/02/18   1604  07/02/18   1309  07/02/18   0940   TROIQ  0.23*  0.11*  <0.05     Lab Results   Component Value Date/Time    Cholesterol, total 74 04/16/2018 04:21 AM    HDL Cholesterol 25 04/16/2018 04:21 AM    LDL, calculated 31.6 04/16/2018 04:21 AM    Triglyceride 87 04/16/2018 04:21 AM    CHOL/HDL Ratio 3.0 04/16/2018 04:21 AM     Lab Results   Component Value Date/Time    Glucose (POC) 141 (H) 07/03/2018 07:24 AM    Glucose (POC) 77 07/03/2018 07:04 AM    Glucose (POC) 79 07/03/2018 06:50 AM    Glucose (POC) 99 07/02/2018 11:54 PM    Glucose (POC) 179 (H) 07/02/2018 05:36 PM     Lab Results   Component Value Date/Time    Color YELLOW/STRAW 07/02/2018 04:04 PM    Appearance CLEAR 07/02/2018 04:04 PM    Specific gravity 1.009 07/02/2018 04:04 PM    pH (UA) 6.0 07/02/2018 04:04 PM    Protein 30 (A) 07/02/2018 04:04 PM    Glucose NEGATIVE  07/02/2018 04:04 PM    Ketone NEGATIVE  07/02/2018 04:04 PM    Bilirubin NEGATIVE  07/02/2018 04:04 PM    Urobilinogen 0.2 07/02/2018 04:04 PM    Nitrites NEGATIVE  07/02/2018 04:04 PM    Leukocyte Esterase MODERATE (A) 07/02/2018 04:04 PM    Epithelial cells FEW 07/02/2018 04:04 PM    Bacteria NEGATIVE  07/02/2018 04:04 PM    WBC 10-20 07/02/2018 04:04 PM    RBC 5-10 07/02/2018 04:04 PM         Medications Reviewed:     Current Facility-Administered Medications   Medication Dose Route Frequency    furosemide (LASIX) tablet 80 mg  80 mg Oral DAILY    anastrozole (ARIMIDEX) tablet 1 mg  1 mg Oral DAILY    aspirin delayed-release tablet 81 mg  81 mg Oral DAILY    carvedilol (COREG) tablet 6.25 mg  6.25 mg Oral BID WITH MEALS    clopidogrel (PLAVIX) tablet 75 mg  75 mg Oral DAILY    docusate sodium (COLACE) capsule 100 mg  100 mg Oral BID    folic acid (FOLVITE) tablet 1 mg  1 mg Oral DAILY    insulin glargine (LANTUS) injection 10 Units  10 Units SubCUTAneous DAILY    levothyroxine (SYNTHROID) tablet 88 mcg  88 mcg Oral ACB    nitroglycerin (NITROSTAT) tablet 0.4 mg  0.4 mg SubLINGual PRN    sodium chloride (NS) flush 5-10 mL  5-10 mL IntraVENous Q8H    sodium chloride (NS) flush 5-10 mL  5-10 mL IntraVENous PRN    glucose chewable tablet 16 g  4 Tab Oral PRN    dextrose (D50W) injection syrg 12.5-25 g  12.5-25 g IntraVENous PRN    glucagon (GLUCAGEN) injection 1 mg  1 mg IntraMUSCular PRN    insulin lispro (HUMALOG) injection   SubCUTAneous Q6H     ______________________________________________________________________  EXPECTED LENGTH OF STAY: 3d 16h  ACTUAL LENGTH OF STAY:          1                 Paul Granado MD

## 2018-07-03 NOTE — PROGRESS NOTES
notes that patient was admitted yesterday from [facility]. She is a high risk re-admit patient. Patient's son David Yan did speak with the  last evening who was covering the ED. Per nursing and  son's don't wish for mom to return to above facility. She had been at [facility] for the past two weeks per son Darryl Hu. I left a message for Robin to call me this pm to discuss a safe discharge when patient is medically stable. I was unable to discuss where patient obtains her prescriptions and note that patient has a private care aide this am and pm.  Patient does use a rollator at home for navigation.

## 2018-07-03 NOTE — NURSE NAVIGATOR
Chart reviewed by Heart Failure Nurse Navigator. Heart Failure database completed. EF:  30     ACEi/ARB: Contraindicated CKD documented physician's note 6/21/18    BB: Coreg    Aldosterone Antagonist: Please consider a guideline directed Aldosterone Receptor Antagonist for patients with an EF of 35% or less and a NYHA functional class of II-IV unless contraindicated. Contraindications include allergy due to aldosterone receptor antagonist, hyperkalemia,(potassium above 5.0 mEq/L ) and renal dysfunction. (creatinine >2.5 mg/dL in men or >2.0 mg/dL in women (or estimated glomerular filtration rate < 30 ml/min/1.732m2)      CRT Not indicated. NYHA Functional Class IV. Heart Failure Teach Back in Patient Education. Heart Failure Avoiding Triggers on Discharge Instructions. Cardiologist: KATHLEEN    Outpatient nurse navigator aware of readmission. Prior admit 6/12/18 - 6/21/18 final diagnosis code of Acute Respiratory Failure with Hypoxia. Post discharge follow up phone call to be made within 48-72 hours of discharge.

## 2018-07-03 NOTE — PROGRESS NOTES
Problem: Self Care Deficits Care Plan (Adult)  Goal: *Acute Goals and Plan of Care (Insert Text)  Occupational Therapy Goals  Initiated 7/3/2018  1. Patient will perform grooming with supervision/set-up in unsupported sit within 7 day(s). 2.  Patient will perform upper body dressing with minimal assistance/contact guard assist within 7 day(s). 3.  Patient will perform toilet transfers with supervision/set-up using least restrictive AD within 7 day(s). 4.  Patient will perform all aspects of toileting with supervision/set-up within 7 day(s). 5.  Patient will participate in upper extremity therapeutic exercise/activities with supervision/set-up for 5 minutes within 7 day(s). Occupational Therapy EVALUATION  Patient: Eusebio Corado (71 y.o. female)  Date: 7/3/2018  Primary Diagnosis: SOB (shortness of breath)        Precautions:   Fall    ASSESSMENT :  Cleared by RN to see pt for therapy session. Based on the objective data described below, the patient presents with poor safety awareness, decreased endurance and balance, and generalized weakness following admission for SOB. At time of session pt is alert and fully oriented to time, person and place, reports being modified I for mobility at home with rollator and having care aids during the day to assist with IADLS and bathing, care aid present and confirming this. In discussion with CM and RN, pt presents to [facility] from SNF, which is confirmed by family. Received semisupine in bed, on 4L O2 with sats 100%. Nasal cannula removed and pt 97% on RA after several minutes. She performed bed mobility with min A, good sitting balance at EOB. Pt providing total A for LB dressing, pt unable to reach LEs due to decreased ROM. Stood with min A x2 and ambulated slightly outside door into hallway, pt attempting to walk further distance but was fatiguing, so further mobility deferred.  Returned to sitting EOB, performed brief seated grooming ADL with CGA, then returned to supine with min A to move LEs. Pt 94% on RA after activity, increased to 97% with rest, nurse informed that pt is on RA. Pt in bed at end of session with caregiver present. She will benefit from skilled intervention to address the above impairments. Pt currently required setup-total A for ADLs and assist for mobility for safety due to decreased balance. She will require 24/7 assistance at discharge for safety, recommend SNF is pt is not able to have this level of assistance at home. Patients rehabilitation potential is considered to be Good  Factors which may influence rehabilitation potential include:   [x]             None noted  []             Mental ability/status  []             Medical condition  []             Home/family situation and support systems  []             Safety awareness  []             Pain tolerance/management  []             Other:      PLAN :  Recommendations and Planned Interventions:  [x]               Self Care Training                  [x]        Therapeutic Activities  [x]               Functional Mobility Training    []        Cognitive Retraining  [x]               Therapeutic Exercises           [x]        Endurance Activities  [x]               Balance Training                   []        Neuromuscular Re-Education  []               Visual/Perceptual Training     [x]   Home Safety Training  [x]               Patient Education                 [x]        Family Training/Education  []               Other (comment):    Frequency/Duration: Patient will be followed by occupational therapy 5 times a week to address goals. Discharge Recommendations: 24/7 assistance for safety; SNF vs. HH  Further Equipment Recommendations for Discharge: TBD     SUBJECTIVE:   Patient stated I usually use a rollator.     OBJECTIVE DATA SUMMARY:   HISTORY:   Past Medical History:   Diagnosis Date    Anemia 9/2/2009    [facility] cancer ([facility] 9/2/2009    surgery/chemo    Colon cancer ([facility] 9/2/2009    DM (diabetes mellitus) ([facility] 9/2/2009    GERD (gastroesophageal reflux disease)     H/O: CVA 9/2/2009    slight l sided weakness    Hypothyroid 9/2/2009    Ill-defined condition     seasonal allergies    Microalbuminuria 9/2/2009    Osteoporosis 9/2/2009    Other and unspecified hyperlipidemia 1/27/2010    Rheumatoid arthritis involving ankle ([facility] 9/28/2016    Rheumatoid arthritis(714.0) 9/2/2009    Unspecified essential hypertension 9/2/2009    Weakness due to cerebrovascular accident      Past Surgical History:   Procedure Laterality Date    HX COLECTOMY      HX HYSTERECTOMY      HX OTHER SURGICAL      colonoscopies numerous since 1993       Prior Level of Function/Environment/Context: t time of session pt is alert and fully oriented to time, person and place, reports being modified I for mobility at home with rollator and having care aids during the day to assist with IADLS and bathing, care aid present and confirming this. In discussion with CM and RN, pt presents to [facility] from SNF, which is confirmed by family. Home Situation  Home Environment: Private residence  # Steps to Enter: 5  Rails to Enter: No  One/Two Story Residence: Two story, live on 1st floor  Living Alone: No  Support Systems: Family member(s), Home care staff (Mon Fri- 5-1, 3-8 care aides)  Patient Expects to be Discharged to[de-identified] Private residence  Current DME Used/Available at Home: Rayetta Danker, rollator, Transfer bench, Grab bars  Tub or Shower Type: Shower    Hand dominance: Right    EXAMINATION OF PERFORMANCE DEFICITS:  Cognitive/Behavioral Status:  Neurologic State: Alert  Orientation Level: Oriented to person;Oriented to time  Cognition: Follows commands  Perception: Appears intact  Perseveration: No perseveration noted  Safety/Judgement: Awareness of environment; Fall prevention    Hearing:   Auditory  Auditory Impairment: Hard of hearing, bilateral, Hearing aid(s)    Vision/Perceptual:            Acuity: Able to read clock/calendar on wall without difficulty; Able to read employee name badge without difficulty         Range of Motion:    AROM: Generally decreased, functional  PROM: Generally decreased, functional          Strength:  Strength: Generally decreased, functional     Coordination:  Coordination: Within functional limits  Fine Motor Skills-Upper: Left Intact; Right Intact    Gross Motor Skills-Upper: Left Intact; Right Intact    Tone & Sensation:  Tone: Normal  Sensation: Intact        Balance:  Sitting: Intact  Standing: Impaired; With support  Standing - Static: Good  Standing - Dynamic : Fair    Functional Mobility and Transfers for ADLs:  Bed Mobility:  Supine to Sit: Minimum assistance; Additional time (HOB elevated)  Sit to Supine: Minimum assistance; Additional time (to lift LEs)    Transfers:  Sit to Stand: Minimum assistance;Assist x2; Additional time  Stand to Sit: Contact guard assistance    ADL Assessment:  Feeding: Setup;Supervision    Oral Facial Hygiene/Grooming: Setup;Supervision    Bathing: Additional time; Adaptive equipment; Moderate assistance    Upper Body Dressing: Moderate assistance    Lower Body Dressing: Maximum assistance    Toileting: Maximum assistance     ADL Intervention and task modifications:     Cues for safe transfer technique as pt often not lined up with sitting surface when attempting to sit. Grooming  Washing Face: Supervision/set-up (seated EOB)      Lower Body Dressing Assistance  Socks: Total assistance (dependent)  Slip on Shoes with Back: Total assistance (dependent)         Cognitive Retraining  Safety/Judgement: Awareness of environment; Fall prevention    Functional Measure:  Barthel Index:    Bathin  Bladder: 5  Bowels: 5  Groomin  Dressin  Feedin  Mobility: 0  Stairs: 0  Toilet Use: 5  Transfer (Bed to Chair and Back): 5  Total: 30       Barthel and G-code impairment scale:  Percentage of impairment CH  0% CI  1-19% CJ  20-39% CK  40-59% CL  60-79% CM  80-99% CN  100%   Barthel Score 0-100 100 99-80 79-60 59-40 20-39 1-19   0   Barthel Score 0-20 20 17-19 13-16 9-12 5-8 1-4 0      The Barthel ADL Index: Guidelines  1. The index should be used as a record of what a patient does, not as a record of what a patient could do. 2. The main aim is to establish degree of independence from any help, physical or verbal, however minor and for whatever reason. 3. The need for supervision renders the patient not independent. 4. A patient's performance should be established using the best available evidence. Asking the patient, friends/relatives and nurses are the usual sources, but direct observation and common sense are also important. However direct testing is not needed. 5. Usually the patient's performance over the preceding 24-48 hours is important, but occasionally longer periods will be relevant. 6. Middle categories imply that the patient supplies over 50 per cent of the effort. 7. Use of aids to be independent is allowed. Jenni Mera., Barthel, D.W. ([id number]). Functional evaluation: the Barthel Index. [facility] (14)2. Maranda Gandhi sandra Shaniqua, DENIA.F, Xu Valiente., Maximustony Laguerre., [place], [address] (1999). Measuring the change indisability after inpatient rehabilitation; comparison of the responsiveness of the Barthel Index and Functional [place] Measure. Journal of Neurology, Neurosurgery, and Psychiatry, 66(4), 710-729. Eduard Harris, N.J.A, MG Ramos.LATA, & Min Santiago MJaimeA. (2004.) Assessment of post-stroke quality of life in cost-effectiveness studies: The usefulness of the Barthel Index and the EuroQoL-5D. Quality of Life Research, 13, 245-10       G codes: In compliance with CMSs Claims Based Outcome Reporting, the following G-code set was chosen for this patient based on their primary functional limitation being treated:     The outcome measure chosen to determine the severity of the functional limitation was the Barthel Index with a score of 30/100 which was correlated with the impairment scale. ? Self Care:      - CURRENT STATUS: CL - 60%-79% impaired, limited or restricted    - GOAL STATUS: CJ - 20%-39% impaired, limited or restricted    - D/C STATUS:  ---------------To be determined---------------     Occupational Therapy Evaluation Charge Determination   History Examination Decision-Making   LOW Complexity : Brief history review  MEDIUM Complexity : 3-5 performance deficits relating to physical, cognitive , or psychosocial skils that result in activity limitations and / or participation restrictions MEDIUM Complexity : Patient may present with comorbidities that affect occupational performnce. Miniml to moderate modification of tasks or assistance (eg, physical or verbal ) with assesment(s) is necessary to enable patient to complete evaluation       Based on the above components, the patient evaluation is determined to be of the following complexity level: LOW   Pain:  Pain Scale 1: Numeric (0 - 10)  Pain Intensity 1: 0              Activity Tolerance:   Good  Please refer to the flowsheet for vital signs taken during this treatment. After treatment:   [] Patient left in no apparent distress sitting up in chair  [x] Patient left in no apparent distress in bed  [x] Call bell left within reach  [x] Nursing notified  [x] Caregiver present  [] Bed alarm activated    COMMUNICATION/EDUCATION:   The patients plan of care was discussed with: Physical Therapist and Registered Nurse. [x] Home safety education was provided and the patient/caregiver indicated understanding. [x] Patient/family have participated as able in goal setting and plan of care. [x] Patient/family agree to work toward stated goals and plan of care. [] Patient understands intent and goals of therapy, but is neutral about his/her participation. [] Patient is unable to participate in goal setting and plan of care.   This patients plan of care is appropriate for delegation to ESTHER.     Thank you for this referral.  Aubree Vela, OT  Time Calculation: 25 mins

## 2018-07-03 NOTE — PROGRESS NOTES
Care manager received a call from son Maria Ines Szymanski and I have a list of area skilled nursing and rehab center for him to visit or check out their ratings on the internet. I did check and per Katya Restrepo at  [facility] this facility accepts the [employer] plan. I also spoke with Jana Almonte at [facility] and they also accept the advantage plan. Sons to discuss the situation amongst themselves and render a decision within the next 24hrs and care manager will be glad to send out referral electronically. CM did drop off list of area SNF'S for son and per patient he had stepped out of his mom's room even though I set up a  Meeting time to meet with him at 3:30pm.  I will follow up in am and communicated above with Vinnie REVELES.

## 2018-07-04 PROBLEM — R06.02 SOB (SHORTNESS OF BREATH): Status: RESOLVED | Noted: 2018-04-16 | Resolved: 2018-07-04

## 2018-07-04 LAB
ANION GAP SERPL CALC-SCNC: 7 MMOL/L (ref 5–15)
BASOPHILS # BLD: 0 K/UL (ref 0–0.1)
BASOPHILS NFR BLD: 1 % (ref 0–1)
BUN SERPL-MCNC: 25 MG/DL (ref 6–20)
BUN/CREAT SERPL: 20 (ref 12–20)
CALCIUM SERPL-MCNC: 8.7 MG/DL (ref 8.5–10.1)
CHLORIDE SERPL-SCNC: 106 MMOL/L (ref 97–108)
CO2 SERPL-SCNC: 25 MMOL/L (ref 21–32)
CREAT SERPL-MCNC: 1.23 MG/DL (ref 0.55–1.02)
DIFFERENTIAL METHOD BLD: ABNORMAL
EOSINOPHIL # BLD: 0 K/UL (ref 0–0.4)
EOSINOPHIL NFR BLD: 2 % (ref 0–7)
ERYTHROCYTE [DISTWIDTH] IN BLOOD BY AUTOMATED COUNT: 21.8 % (ref 11.5–14.5)
GLUCOSE BLD STRIP.AUTO-MCNC: 143 MG/DL (ref 65–100)
GLUCOSE BLD STRIP.AUTO-MCNC: 195 MG/DL (ref 65–100)
GLUCOSE BLD STRIP.AUTO-MCNC: 230 MG/DL (ref 65–100)
GLUCOSE SERPL-MCNC: 184 MG/DL (ref 65–100)
HCT VFR BLD AUTO: 33 % (ref 35–47)
HGB BLD-MCNC: 10.4 G/DL (ref 11.5–16)
IMM GRANULOCYTES # BLD: 0 K/UL
IMM GRANULOCYTES NFR BLD AUTO: 0 %
LYMPHOCYTES # BLD: 0.8 K/UL (ref 0.8–3.5)
LYMPHOCYTES NFR BLD: 35 % (ref 12–49)
MAGNESIUM SERPL-MCNC: 1.7 MG/DL (ref 1.6–2.4)
MCH RBC QN AUTO: 27.8 PG (ref 26–34)
MCHC RBC AUTO-ENTMCNC: 31.5 G/DL (ref 30–36.5)
MCV RBC AUTO: 88.2 FL (ref 80–99)
MONOCYTES # BLD: 0.2 K/UL (ref 0–1)
MONOCYTES NFR BLD: 10 % (ref 5–13)
NEUTS SEG # BLD: 1.4 K/UL (ref 1.8–8)
NEUTS SEG NFR BLD: 52 % (ref 32–75)
NRBC # BLD: 0 K/UL (ref 0–0.01)
NRBC BLD-RTO: 0 PER 100 WBC
PLATELET # BLD AUTO: 100 K/UL (ref 150–400)
PLATELET COMMENTS,PCOM: ABNORMAL
PMV BLD AUTO: 10.7 FL (ref 8.9–12.9)
POTASSIUM SERPL-SCNC: 3.8 MMOL/L (ref 3.5–5.1)
RBC # BLD AUTO: 3.74 M/UL (ref 3.8–5.2)
RBC MORPH BLD: ABNORMAL
RBC MORPH BLD: ABNORMAL
SERVICE CMNT-IMP: ABNORMAL
SODIUM SERPL-SCNC: 138 MMOL/L (ref 136–145)
WBC # BLD AUTO: 2.4 K/UL (ref 3.6–11)

## 2018-07-04 PROCEDURE — 74011250637 HC RX REV CODE- 250/637: Performed by: NURSE PRACTITIONER

## 2018-07-04 PROCEDURE — 85025 COMPLETE CBC W/AUTO DIFF WBC: CPT | Performed by: INTERNAL MEDICINE

## 2018-07-04 PROCEDURE — 80048 BASIC METABOLIC PNL TOTAL CA: CPT | Performed by: NURSE PRACTITIONER

## 2018-07-04 PROCEDURE — 82962 GLUCOSE BLOOD TEST: CPT

## 2018-07-04 PROCEDURE — 74011250637 HC RX REV CODE- 250/637: Performed by: FAMILY MEDICINE

## 2018-07-04 PROCEDURE — 77030019563 HC DEV ATTCH FEED HOLL -A

## 2018-07-04 PROCEDURE — 74011636637 HC RX REV CODE- 636/637: Performed by: INTERNAL MEDICINE

## 2018-07-04 PROCEDURE — 83735 ASSAY OF MAGNESIUM: CPT | Performed by: NURSE PRACTITIONER

## 2018-07-04 PROCEDURE — 74011636637 HC RX REV CODE- 636/637: Performed by: FAMILY MEDICINE

## 2018-07-04 PROCEDURE — 65660000000 HC RM CCU STEPDOWN

## 2018-07-04 PROCEDURE — 74011250637 HC RX REV CODE- 250/637: Performed by: INTERNAL MEDICINE

## 2018-07-04 PROCEDURE — 36415 COLL VENOUS BLD VENIPUNCTURE: CPT | Performed by: NURSE PRACTITIONER

## 2018-07-04 RX ORDER — FUROSEMIDE 40 MG/1
80 TABLET ORAL DAILY
Qty: 60 TAB | Refills: 0 | Status: SHIPPED | OUTPATIENT
Start: 2018-07-04 | End: 2018-07-06

## 2018-07-04 RX ORDER — INSULIN GLARGINE 100 [IU]/ML
12 INJECTION, SOLUTION SUBCUTANEOUS DAILY
Status: DISCONTINUED | OUTPATIENT
Start: 2018-07-04 | End: 2018-07-06 | Stop reason: HOSPADM

## 2018-07-04 RX ADMIN — INSULIN GLARGINE 12 UNITS: 100 INJECTION, SOLUTION SUBCUTANEOUS at 10:57

## 2018-07-04 RX ADMIN — ANASTROZOLE 1 MG: 1 TABLET, COATED ORAL at 09:17

## 2018-07-04 RX ADMIN — Medication 10 ML: at 14:04

## 2018-07-04 RX ADMIN — FOLIC ACID 1 MG: 1 TABLET ORAL at 09:07

## 2018-07-04 RX ADMIN — ALUMINUM HYDROXIDE AND MAGNESIUM HYDROXIDE 15 ML: 200; 200 SUSPENSION ORAL at 09:07

## 2018-07-04 RX ADMIN — Medication 10 ML: at 21:14

## 2018-07-04 RX ADMIN — FUROSEMIDE 80 MG: 40 TABLET ORAL at 09:07

## 2018-07-04 RX ADMIN — INSULIN LISPRO 2 UNITS: 100 INJECTION, SOLUTION INTRAVENOUS; SUBCUTANEOUS at 12:00

## 2018-07-04 RX ADMIN — CLOPIDOGREL BISULFATE 75 MG: 75 TABLET ORAL at 09:07

## 2018-07-04 RX ADMIN — CARVEDILOL 6.25 MG: 6.25 TABLET, FILM COATED ORAL at 17:17

## 2018-07-04 RX ADMIN — LEVOTHYROXINE SODIUM 88 MCG: 88 TABLET ORAL at 07:29

## 2018-07-04 RX ADMIN — CARVEDILOL 6.25 MG: 6.25 TABLET, FILM COATED ORAL at 09:07

## 2018-07-04 RX ADMIN — DOCUSATE SODIUM 100 MG: 100 CAPSULE, LIQUID FILLED ORAL at 09:07

## 2018-07-04 RX ADMIN — ASPIRIN 81 MG: 81 TABLET, COATED ORAL at 09:07

## 2018-07-04 NOTE — PROGRESS NOTES
Problem: Pressure Injury - Risk of  Goal: *Prevention of pressure injury  Document Calderon Scale and appropriate interventions in the flowsheet. Turn q2h and float heels off bed with pillows.    Outcome: Progressing Towards Goal  Pressure Injury Interventions:       Moisture Interventions: Internal/External urinary devices    Activity Interventions: Assess need for specialty bed    Mobility Interventions: Float heels    Nutrition Interventions: Document food/fluid/supplement intake    Friction and Shear Interventions: Apply protective barrier, creams and emollients

## 2018-07-04 NOTE — PROGRESS NOTES
Spoke with patient's son, Cameron Heart, who asked that I send referrals to [address] [place] [address]. Son is overly preoccupied with medications that are expensive and they bring from home. Told him we need to get a SNF to accept patient's insurance first and then we'll discuss medications with them.

## 2018-07-04 NOTE — PROGRESS NOTES
Problem: Falls - Risk of  Goal: *Absence of Falls  Document Yaron Fall Risk and appropriate interventions in the flowsheet.    Outcome: Progressing Towards Goal  Fall Risk Interventions:  Mobility Interventions: Patient to call before getting OOB         Medication Interventions: Patient to call before getting OOB    Elimination Interventions: Patient to call for help with toileting needs

## 2018-07-04 NOTE — PROGRESS NOTES
TRANSFER - OUT REPORT:    Verbal report given to Cheo(name) on Franco Lam  being transferred to (unit) for routine progression of care       Report consisted of patients Situation, Background, Assessment and   Recommendations(SBAR). Information from the following report(s) SBAR, Kardex, Procedure Summary and Recent Results was reviewed with the receiving nurse.     Lines:   Peripheral IV 07/02/18 Right Antecubital (Active)   Site Assessment Clean, dry, & intact 7/4/2018  8:00 AM   Phlebitis Assessment 0 7/4/2018  8:00 AM   Infiltration Assessment 0 7/4/2018  8:00 AM   Dressing Status Clean, dry, & intact 7/4/2018  8:00 AM   Dressing Type Tape;Transparent 7/4/2018  8:00 AM   Hub Color/Line Status Blue;Capped 7/4/2018  8:00 AM   Action Taken Open ports on tubing capped 7/4/2018  8:00 AM   Alcohol Cap Used Yes 7/4/2018  8:00 AM       Peripheral IV 07/02/18 Right Wrist (Active)   Site Assessment Clean, dry, & intact 7/4/2018  8:00 AM   Phlebitis Assessment 0 7/4/2018  8:00 AM   Infiltration Assessment 0 7/4/2018  8:00 AM   Dressing Status Clean, dry, & intact 7/4/2018  8:00 AM   Dressing Type Tape;Transparent 7/4/2018  8:00 AM   Hub Color/Line Status Pink;Capped 7/4/2018  8:00 AM   Action Taken Open ports on tubing capped 7/4/2018  8:00 AM   Alcohol Cap Used Yes 7/4/2018  8:00 AM       Peripheral IV 07/02/18 Left Forearm (Active)   Site Assessment Clean, dry, & intact 7/4/2018  8:00 AM   Phlebitis Assessment 0 7/4/2018  8:00 AM   Infiltration Assessment 0 7/4/2018  8:00 AM   Dressing Status Clean, dry, & intact 7/4/2018  8:00 AM   Dressing Type Tape;Transparent 7/4/2018  8:00 AM   Hub Color/Line Status Pink;Capped;Flushed 7/4/2018  8:00 AM   Action Taken Open ports on tubing capped 7/4/2018  8:00 AM   Alcohol Cap Used Yes 7/4/2018  8:00 AM       Peripheral IV 07/02/18 Right;Upper Arm (Active)   Site Assessment Clean, dry, & intact 7/3/2018  8:00 PM   Phlebitis Assessment 0 7/3/2018  8:00 PM   Infiltration Assessment 0 7/3/2018  8:00 PM   Dressing Status Clean, dry, & intact 7/3/2018  8:00 PM   Dressing Type Tape 7/3/2018  8:00 PM   Hub Color/Line Status Capped 7/3/2018  8:00 PM   Action Taken Open ports on tubing capped 7/3/2018  8:00 PM   Alcohol Cap Used Yes 7/3/2018  8:00 PM        Opportunity for questions and clarification was provided.       Patient transported with:   Registered Nurse

## 2018-07-04 NOTE — PROGRESS NOTES
Hospitalist Progress Note  Opal Galvez MD  Answering service: [phone] OR [phone] from in house phone  Cell: [phone]      Date of Service:  2018  NAME:  Candance Dotter  :  1935  MRN:  [id number]      Admission Summary:     Ms. Mellissa Bravo is an 82-year-old female with past medical history of chronic respiratory failure, recurrent episodes of CHF exacerbation and pulmonary edema, history of coronary artery disease with multiple episodes of elevated troponin, history of severe multivessel disease, history of CKD stage III, anemia of chronic disease, metastatic left breast cancer, hypertension and diabetes, who presents to the hospital with the above-mentioned symptoms. The patient reports that she was recently admitted in  with shortness of breath secondary to \"fluid in her lungs. \" The patient reports that there was concern that she may have had an infection and was started on antibiotics. Regardless, the patient reports that she lives at [facility], and started having some shortness of breath yesterday. She reports that today, the shortness of breath got worse. EMS was called, and the patient was found to have a pulse oximetry of 64% on room air. EMS put the patient on CPAP and the patient's saturations got better. Interval history / Subjective:        Patient is now on NC and doing well. No SOB, pain, or swelling. Discussed discharge with son. He states he needs to visit any optential SNF before sending his mom out.      Assessment & Plan:     Shortness of breath, secondary to acute on chronic hypoxic respiratory failure, presumed to be secondary to acute on chronic CHF exacerbation, NYHA IV on admission:  -patient almost at baseline and ready for discharge  -now on NC  -continue diuretics  -recent echocardiogram done on 2018, which showed systolic function that was markedly to moderately reduced, ejection fraction of 30% and a small pericardial effusion. -continue the patient on aspirin and Plavix  -Cardiology is consulted  -Strict ins and outs, daily weights     Elevated troponin to 0.23. No need to trend, per Cardiology.  -no chest pain    Diabetes mellitus type 2: Lantus, sliding scale, Accu-Cheks. Lactic acidosis, unclear etiology. Resolved. Chronic kidney disease stage III, stable. History of breast cancer, stage IV with metastasis.       Code status: Partial  DVT prophylaxis: SCDs    Care Plan discussed with: Patient/Family. Aide, Nurse  Disposition: SNF when bed available     Hospital Problems  Date Reviewed: 7/2/2018    None          Review of Systems:   Pertinent items are noted in HPI. Vital Signs:    Last 24hrs VS reviewed since prior progress note. Most recent are:  Visit Vitals    /55 (BP 1 Location: Right arm, BP Patient Position: At rest;Sitting)    Pulse 98    Temp 98.3 °F (36.8 °C)    Resp 20    Ht 5' 1\" (1.549 m)    Wt 60.1 kg (132 lb 8 oz)    SpO2 98%    BMI 25.04 kg/m2         Intake/Output Summary (Last 24 hours) at 07/04/18 1329  Last data filed at 07/04/18 0800   Gross per 24 hour   Intake              240 ml   Output              400 ml   Net             -160 ml        Physical Examination:         Constitutional:  No acute distress, cooperative   ENT:  Neck supple    Resp:  Slightly decreased breath sounds bilaterally, improved   CV:  Regular rhythm, normal rate, normal S1 and S2    GI:  Soft, non distended, non tender, normoactive bowel sounds    Musculoskeletal:  No edema, warm    Neurologic:  Moves all extremities.   AAOx3       Data Review:    Review and/or order of clinical lab test  Review and/or order of tests in the medicine section of CPT    Labs:     Recent Labs      07/04/18 0425 07/03/18   0358   WBC  2.4*  2.5*   HGB  10.4*  10.3*   HCT  33.0*  33.5*   PLT  100*  93*     Recent Labs      07/04/18 0425 07/03/18   0358  07/02/18   0940   NA  138  140  137 K  3.8  3.7  4.0   CL  106  108  106   CO2  25  24  19*   BUN  25*  22*  18   CREA  1.23*  1.19*  1.31*   GLU  184*  73  362*   CA  8.7  8.7  8.9   MG  1.7  1.7   --      Recent Labs      07/02/18   0940   SGOT  29   ALT  21   AP  747*   TBILI  0.5   TP  8.4*   ALB  2.8*   GLOB  5.6*     No results for input(s): INR, PTP, APTT in the last 72 hours. No lab exists for component: INREXT, INREXT   No results for input(s): FE, TIBC, PSAT, FERR in the last 72 hours. No results found for: FOL, RBCF   No results for input(s): PH, PCO2, PO2 in the last 72 hours.   Recent Labs      07/02/18   1604  07/02/18   1309  07/02/18   0940   TROIQ  0.23*  0.11*  <0.05     Lab Results   Component Value Date/Time    Cholesterol, total 74 04/16/2018 04:21 AM    HDL Cholesterol 25 04/16/2018 04:21 AM    LDL, calculated 31.6 04/16/2018 04:21 AM    Triglyceride 87 04/16/2018 04:21 AM    CHOL/HDL Ratio 3.0 04/16/2018 04:21 AM     Lab Results   Component Value Date/Time    Glucose (POC) 230 (H) 07/04/2018 11:33 AM    Glucose (POC) 143 (H) 07/04/2018 06:58 AM    Glucose (POC) 248 (H) 07/03/2018 10:02 PM    Glucose (POC) 188 (H) 07/03/2018 04:50 PM    Glucose (POC) 181 (H) 07/03/2018 12:01 PM     Lab Results   Component Value Date/Time    Color YELLOW/STRAW 07/02/2018 04:04 PM    Appearance CLEAR 07/02/2018 04:04 PM    Specific gravity 1.009 07/02/2018 04:04 PM    pH (UA) 6.0 07/02/2018 04:04 PM    Protein 30 (A) 07/02/2018 04:04 PM    Glucose NEGATIVE  07/02/2018 04:04 PM    Ketone NEGATIVE  07/02/2018 04:04 PM    Bilirubin NEGATIVE  07/02/2018 04:04 PM    Urobilinogen 0.2 07/02/2018 04:04 PM    Nitrites NEGATIVE  07/02/2018 04:04 PM    Leukocyte Esterase MODERATE (A) 07/02/2018 04:04 PM    Epithelial cells FEW 07/02/2018 04:04 PM    Bacteria NEGATIVE  07/02/2018 04:04 PM    WBC 10-20 07/02/2018 04:04 PM    RBC 5-10 07/02/2018 04:04 PM         Medications Reviewed:     Current Facility-Administered Medications   Medication Dose Route Frequency    insulin glargine (LANTUS) injection 12 Units  12 Units SubCUTAneous DAILY    furosemide (LASIX) tablet 80 mg  80 mg Oral DAILY    palbociclib cap 125 mg (Patient Supplied)  1 Cap Oral DAILY    anastrozole (ARIMIDEX) tablet 1 mg  1 mg Oral DAILY    aspirin delayed-release tablet 81 mg  81 mg Oral DAILY    carvedilol (COREG) tablet 6.25 mg  6.25 mg Oral BID WITH MEALS    clopidogrel (PLAVIX) tablet 75 mg  75 mg Oral DAILY    docusate sodium (COLACE) capsule 100 mg  100 mg Oral BID    folic acid (FOLVITE) tablet 1 mg  1 mg Oral DAILY    levothyroxine (SYNTHROID) tablet 88 mcg  88 mcg Oral ACB    nitroglycerin (NITROSTAT) tablet 0.4 mg  0.4 mg SubLINGual PRN    sodium chloride (NS) flush 5-10 mL  5-10 mL IntraVENous Q8H    sodium chloride (NS) flush 5-10 mL  5-10 mL IntraVENous PRN    glucose chewable tablet 16 g  4 Tab Oral PRN    dextrose (D50W) injection syrg 12.5-25 g  12.5-25 g IntraVENous PRN    glucagon (GLUCAGEN) injection 1 mg  1 mg IntraMUSCular PRN    insulin lispro (HUMALOG) injection   SubCUTAneous Q6H     ______________________________________________________________________  EXPECTED LENGTH OF STAY: 3d 16h  ACTUAL LENGTH OF STAY:          2                 Hyacinth Schulz MD

## 2018-07-04 NOTE — PROGRESS NOTES
TRANSFER - IN REPORT:    Verbal report received from [place] [id number], [id number] [facility] (name) on Anne-Marie Campbell  being received from [facility] (unit) for routine progression of care      Report consisted of patients Situation, Background, Assessment and   Recommendations(SBAR). Information from the following report(s) SBAR, Kardex, Intake/Output and MAR was reviewed with the receiving nurse. Opportunity for questions and clarification was provided. Assessment completed upon patients arrival to unit and care assumed.

## 2018-07-04 NOTE — PROGRESS NOTES
0730 - Bedside and Verbal shift change report given to avtar akhtar (oncoming nurse) by Carolina Stratton (offgoing nurse). Report included the following information SBAR, Kardex, Procedure Summary, Intake/Output, MAR, Recent Results and Cardiac Rhythm NSR.   1530 - Bedside and Verbal shift change report given to annette (oncoming nurse) by Joseluis Man (offgoing nurse). Report included the following information SBAR, Kardex, Intake/Output, MAR, Recent Results and Cardiac Rhythm NSR.

## 2018-07-04 NOTE — PROGRESS NOTES
Patient's son not willing to accept condition of patient. He is not interested in Palliative Care meeting.

## 2018-07-04 NOTE — PROGRESS NOTES
Cardiology Progress Note            Admit Date: 7/2/2018  Admit Diagnosis: SOB (shortness of breath)  Date: 7/4/2018     Time: 12:16 PM    Subjective:  Denies chest pain, palpitations, SOB. On bipap     Assessment and Plan   1. Acute on chronic systolic CHF:  NYHA IV on admission  -TTE 6/1/18 EF 30%, NWMA  -Continue Coreg 6.25 mg BID  -No ace-I due to CKD/elevated creatinine  Tolerating 80mg po lasix   -Daily weights and I/Os  -continue bipap at HS     2. Hx of CAD/severe multivessal disease  -12 lead EKG:  NSR with PACs, no acute ischemic changes.   -Not candidate for CABG or PCI   -Not on statin due to intolerance  -Continue Repatha  -trivial troponin elevation (0.23) this admission likely from demand ischemia- not NSTEMI. No need for further troponins.     3. Lactic acidosis: resolved     4. Hx of CKD:  Creatinine stable     5. Hx of HTN;  bp controlled. -Coreg as above     6. Hx of metastatic breast cancer:  Has seen palliative care in past.     7. Hx of DM type II    8. Anemia, chronic disease     9. Advanced directives: Palliative care has seen pt in past admission. Currently partial code- no compressions, no intubation.        Higher dose diuretics for now and watch bp/renal function. Oxygen sats good during day on room air      Objective:      Physical Exam:                Visit Vitals    /52 (BP 1 Location: Left arm, BP Patient Position: At rest)    Pulse 87    Temp 98.6 °F (37 °C)    Resp 18    Ht 5' 1\" (1.549 m)    Wt 132 lb 8 oz (60.1 kg)    SpO2 100%    BMI 25.04 kg/m2          General Appearance:   Well developed, well nourished, alert, individual in no acute distress. Ears/Nose/Mouth/Throat:    Hearing grossly normal.         Neck:  Supple. Chest:    Lungs clear   Cardiovascular:   Regular rate and rhythm, S1, S2 normal, no murmur. Abdomen:    Soft, non-tender, non-distended.    Extremities:  No edema bilaterally. Skin:  Warm and dry. Telemetry: normal sinus rhythm with PACs          Data Review:    Labs:    Recent Results (from the past 24 hour(s))   GLUCOSE, POC    Collection Time: 07/03/18 12:01 PM   Result Value Ref Range    Glucose (POC) 181 (H) 65 - 100 mg/dL    Performed by Mandeep Bloom    GLUCOSE, POC    Collection Time: 07/03/18  4:50 PM   Result Value Ref Range    Glucose (POC) 188 (H) 65 - 100 mg/dL    Performed by Melany Monday    GLUCOSE, POC    Collection Time: 07/03/18 10:02 PM   Result Value Ref Range    Glucose (POC) 248 (H) 65 - 100 mg/dL    Performed by [employer]    METABOLIC PANEL, BASIC    Collection Time: 07/04/18  4:25 AM   Result Value Ref Range    Sodium 138 136 - 145 mmol/L    Potassium 3.8 3.5 - 5.1 mmol/L    Chloride 106 97 - 108 mmol/L    CO2 25 21 - 32 mmol/L    Anion gap 7 5 - 15 mmol/L    Glucose 184 (H) 65 - 100 mg/dL    BUN 25 (H) 6 - 20 MG/DL    Creatinine 1.23 (H) 0.55 - 1.02 MG/DL    BUN/Creatinine ratio 20 12 - 20      GFR est AA 51 (L) >60 ml/min/1.73m2    GFR est non-AA 42 (L) >60 ml/min/1.73m2    Calcium 8.7 8.5 - 10.1 MG/DL   MAGNESIUM    Collection Time: 07/04/18  4:25 AM   Result Value Ref Range    Magnesium 1.7 1.6 - 2.4 mg/dL   CBC WITH AUTOMATED DIFF    Collection Time: 07/04/18  4:25 AM   Result Value Ref Range    WBC 2.4 (L) 3.6 - 11.0 K/uL    RBC 3.74 (L) 3.80 - 5.20 M/uL    HGB 10.4 (L) 11.5 - 16.0 g/dL    HCT 33.0 (L) 35.0 - 47.0 %    MCV 88.2 80.0 - 99.0 FL    MCH 27.8 26.0 - 34.0 PG    MCHC 31.5 30.0 - 36.5 g/dL    RDW 21.8 (H) 11.5 - 14.5 %    PLATELET 561 (L) 805 - 400 K/uL    MPV 10.7 8.9 - 12.9 FL    NRBC 0.0 0  WBC    ABSOLUTE NRBC 0.00 0.00 - 0.01 K/uL    NEUTROPHILS 52 32 - 75 %    LYMPHOCYTES 35 12 - 49 %    MONOCYTES 10 5 - 13 %    EOSINOPHILS 2 0 - 7 %    BASOPHILS 1 0 - 1 %    IMMATURE GRANULOCYTES 0 %    ABS. NEUTROPHILS 1.4 (L) 1.8 - 8.0 K/UL    ABS. LYMPHOCYTES 0.8 0.8 - 3.5 K/UL    ABS.  MONOCYTES 0.2 0.0 - 1.0 K/UL ABS. EOSINOPHILS 0.0 0.0 - 0.4 K/UL    ABS. BASOPHILS 0.0 0.0 - 0.1 K/UL    ABS. IMM.  GRANS. 0.0 K/UL    DF AUTOMATED      PLATELET COMMENTS Large Platelets      RBC COMMENTS ANISOCYTOSIS  2+        RBC COMMENTS POIKILOCYTOSIS  1+       GLUCOSE, POC    Collection Time: 07/04/18  6:58 AM   Result Value Ref Range    Glucose (POC) 143 (H) 65 - 100 mg/dL    Performed by Moisés DUNBAR           Radiology:        Current Facility-Administered Medications   Medication Dose Route Frequency    furosemide (LASIX) tablet 80 mg  80 mg Oral DAILY    palbociclib cap 125 mg (Patient Supplied)  1 Cap Oral DAILY    aluminum-magnesium hydroxide (MAALOX) oral suspension 15 mL  15 mL Oral ONCE    anastrozole (ARIMIDEX) tablet 1 mg  1 mg Oral DAILY    aspirin delayed-release tablet 81 mg  81 mg Oral DAILY    carvedilol (COREG) tablet 6.25 mg  6.25 mg Oral BID WITH MEALS    clopidogrel (PLAVIX) tablet 75 mg  75 mg Oral DAILY    docusate sodium (COLACE) capsule 100 mg  100 mg Oral BID    folic acid (FOLVITE) tablet 1 mg  1 mg Oral DAILY    insulin glargine (LANTUS) injection 10 Units  10 Units SubCUTAneous DAILY    levothyroxine (SYNTHROID) tablet 88 mcg  88 mcg Oral ACB    nitroglycerin (NITROSTAT) tablet 0.4 mg  0.4 mg SubLINGual PRN    sodium chloride (NS) flush 5-10 mL  5-10 mL IntraVENous Q8H    sodium chloride (NS) flush 5-10 mL  5-10 mL IntraVENous PRN    glucose chewable tablet 16 g  4 Tab Oral PRN    dextrose (D50W) injection syrg 12.5-25 g  12.5-25 g IntraVENous PRN    glucagon (GLUCAGEN) injection 1 mg  1 mg IntraMUSCular PRN    insulin lispro (HUMALOG) injection   SubCUTAneous Q6H          MD Viv Mitchell MD       [facility] [address] [facility] [address] [facility] [address]   [facility]   [phone]

## 2018-07-05 LAB
ANION GAP SERPL CALC-SCNC: 9 MMOL/L (ref 5–15)
BASOPHILS # BLD: 0.1 K/UL (ref 0–0.1)
BASOPHILS NFR BLD: 3 % (ref 0–1)
BUN SERPL-MCNC: 25 MG/DL (ref 6–20)
BUN/CREAT SERPL: 22 (ref 12–20)
CALCIUM SERPL-MCNC: 8.3 MG/DL (ref 8.5–10.1)
CHLORIDE SERPL-SCNC: 102 MMOL/L (ref 97–108)
CO2 SERPL-SCNC: 26 MMOL/L (ref 21–32)
CREAT SERPL-MCNC: 1.14 MG/DL (ref 0.55–1.02)
DIFFERENTIAL METHOD BLD: ABNORMAL
EOSINOPHIL # BLD: 0 K/UL (ref 0–0.4)
EOSINOPHIL NFR BLD: 0 % (ref 0–7)
ERYTHROCYTE [DISTWIDTH] IN BLOOD BY AUTOMATED COUNT: 21.7 % (ref 11.5–14.5)
GLUCOSE BLD STRIP.AUTO-MCNC: 226 MG/DL (ref 65–100)
GLUCOSE BLD STRIP.AUTO-MCNC: 235 MG/DL (ref 65–100)
GLUCOSE BLD STRIP.AUTO-MCNC: 243 MG/DL (ref 65–100)
GLUCOSE BLD STRIP.AUTO-MCNC: 253 MG/DL (ref 65–100)
GLUCOSE BLD STRIP.AUTO-MCNC: 334 MG/DL (ref 65–100)
GLUCOSE SERPL-MCNC: 121 MG/DL (ref 65–100)
HCT VFR BLD AUTO: 32.4 % (ref 35–47)
HGB BLD-MCNC: 10.5 G/DL (ref 11.5–16)
IMM GRANULOCYTES # BLD: 0 K/UL
IMM GRANULOCYTES NFR BLD AUTO: 0 %
LACTATE SERPL-SCNC: 1 MMOL/L (ref 0.4–2)
LYMPHOCYTES # BLD: 1 K/UL (ref 0.8–3.5)
LYMPHOCYTES NFR BLD: 40 % (ref 12–49)
MAGNESIUM SERPL-MCNC: 1.5 MG/DL (ref 1.6–2.4)
MCH RBC QN AUTO: 28 PG (ref 26–34)
MCHC RBC AUTO-ENTMCNC: 32.4 G/DL (ref 30–36.5)
MCV RBC AUTO: 86.4 FL (ref 80–99)
MONOCYTES # BLD: 0.4 K/UL (ref 0–1)
MONOCYTES NFR BLD: 15 % (ref 5–13)
NEUTS SEG # BLD: 0.9 K/UL (ref 1.8–8)
NEUTS SEG NFR BLD: 42 % (ref 32–75)
NRBC # BLD: 0 K/UL (ref 0–0.01)
NRBC BLD-RTO: 0 PER 100 WBC
PLATELET # BLD AUTO: 106 K/UL (ref 150–400)
PMV BLD AUTO: 10.7 FL (ref 8.9–12.9)
POTASSIUM SERPL-SCNC: 3.4 MMOL/L (ref 3.5–5.1)
RBC # BLD AUTO: 3.75 M/UL (ref 3.8–5.2)
RBC MORPH BLD: ABNORMAL
SERVICE CMNT-IMP: ABNORMAL
SODIUM SERPL-SCNC: 137 MMOL/L (ref 136–145)
WBC # BLD AUTO: 2.4 K/UL (ref 3.6–11)

## 2018-07-05 PROCEDURE — 36415 COLL VENOUS BLD VENIPUNCTURE: CPT | Performed by: INTERNAL MEDICINE

## 2018-07-05 PROCEDURE — 83605 ASSAY OF LACTIC ACID: CPT | Performed by: FAMILY MEDICINE

## 2018-07-05 PROCEDURE — 74011250637 HC RX REV CODE- 250/637: Performed by: NURSE PRACTITIONER

## 2018-07-05 PROCEDURE — 94660 CPAP INITIATION&MGMT: CPT

## 2018-07-05 PROCEDURE — 74011250636 HC RX REV CODE- 250/636: Performed by: NURSE PRACTITIONER

## 2018-07-05 PROCEDURE — 74011250637 HC RX REV CODE- 250/637: Performed by: INTERNAL MEDICINE

## 2018-07-05 PROCEDURE — 82962 GLUCOSE BLOOD TEST: CPT

## 2018-07-05 PROCEDURE — 77010033678 HC OXYGEN DAILY

## 2018-07-05 PROCEDURE — 85025 COMPLETE CBC W/AUTO DIFF WBC: CPT | Performed by: INTERNAL MEDICINE

## 2018-07-05 PROCEDURE — 65660000000 HC RM CCU STEPDOWN

## 2018-07-05 PROCEDURE — 83735 ASSAY OF MAGNESIUM: CPT | Performed by: NURSE PRACTITIONER

## 2018-07-05 PROCEDURE — 74011250637 HC RX REV CODE- 250/637: Performed by: FAMILY MEDICINE

## 2018-07-05 PROCEDURE — 74011636637 HC RX REV CODE- 636/637: Performed by: FAMILY MEDICINE

## 2018-07-05 PROCEDURE — 97116 GAIT TRAINING THERAPY: CPT

## 2018-07-05 PROCEDURE — 74011636637 HC RX REV CODE- 636/637: Performed by: INTERNAL MEDICINE

## 2018-07-05 PROCEDURE — 80048 BASIC METABOLIC PNL TOTAL CA: CPT | Performed by: INTERNAL MEDICINE

## 2018-07-05 RX ORDER — HYDRALAZINE HYDROCHLORIDE 25 MG/1
25 TABLET, FILM COATED ORAL 3 TIMES DAILY
Status: DISCONTINUED | OUTPATIENT
Start: 2018-07-05 | End: 2018-07-06

## 2018-07-05 RX ORDER — FUROSEMIDE 10 MG/ML
40 INJECTION INTRAMUSCULAR; INTRAVENOUS ONCE
Status: COMPLETED | OUTPATIENT
Start: 2018-07-05 | End: 2018-07-05

## 2018-07-05 RX ORDER — ISOSORBIDE DINITRATE 20 MG/1
20 TABLET ORAL 3 TIMES DAILY
Status: DISCONTINUED | OUTPATIENT
Start: 2018-07-05 | End: 2018-07-06

## 2018-07-05 RX ORDER — POTASSIUM CHLORIDE 20MEQ/15ML
20 LIQUID (ML) ORAL 2 TIMES DAILY WITH MEALS
Status: DISCONTINUED | OUTPATIENT
Start: 2018-07-05 | End: 2018-07-05

## 2018-07-05 RX ORDER — POTASSIUM CHLORIDE 20MEQ/15ML
20 LIQUID (ML) ORAL ONCE
Status: COMPLETED | OUTPATIENT
Start: 2018-07-05 | End: 2018-07-05

## 2018-07-05 RX ADMIN — Medication 10 ML: at 21:54

## 2018-07-05 RX ADMIN — Medication 10 ML: at 07:11

## 2018-07-05 RX ADMIN — ISOSORBIDE DINITRATE 20 MG: 20 TABLET ORAL at 10:51

## 2018-07-05 RX ADMIN — INSULIN LISPRO 2 UNITS: 100 INJECTION, SOLUTION INTRAVENOUS; SUBCUTANEOUS at 12:03

## 2018-07-05 RX ADMIN — LEVOTHYROXINE SODIUM 88 MCG: 88 TABLET ORAL at 07:11

## 2018-07-05 RX ADMIN — ASPIRIN 81 MG: 81 TABLET, COATED ORAL at 09:22

## 2018-07-05 RX ADMIN — INSULIN LISPRO 3 UNITS: 100 INJECTION, SOLUTION INTRAVENOUS; SUBCUTANEOUS at 00:34

## 2018-07-05 RX ADMIN — ISOSORBIDE DINITRATE 20 MG: 20 TABLET ORAL at 17:49

## 2018-07-05 RX ADMIN — Medication 10 ML: at 17:50

## 2018-07-05 RX ADMIN — ANASTROZOLE 1 MG: 1 TABLET, COATED ORAL at 09:22

## 2018-07-05 RX ADMIN — POTASSIUM CHLORIDE 20 MEQ: 40 SOLUTION ORAL at 09:21

## 2018-07-05 RX ADMIN — FUROSEMIDE 40 MG: 10 INJECTION, SOLUTION INTRAMUSCULAR; INTRAVENOUS at 10:56

## 2018-07-05 RX ADMIN — INSULIN LISPRO 4 UNITS: 100 INJECTION, SOLUTION INTRAVENOUS; SUBCUTANEOUS at 19:37

## 2018-07-05 RX ADMIN — CLOPIDOGREL BISULFATE 75 MG: 75 TABLET ORAL at 09:22

## 2018-07-05 RX ADMIN — CARVEDILOL 6.25 MG: 6.25 TABLET, FILM COATED ORAL at 17:55

## 2018-07-05 RX ADMIN — CARVEDILOL 6.25 MG: 6.25 TABLET, FILM COATED ORAL at 07:11

## 2018-07-05 RX ADMIN — DOCUSATE SODIUM 100 MG: 100 CAPSULE, LIQUID FILLED ORAL at 09:22

## 2018-07-05 RX ADMIN — DOCUSATE SODIUM 100 MG: 100 CAPSULE, LIQUID FILLED ORAL at 17:55

## 2018-07-05 RX ADMIN — INSULIN GLARGINE 12 UNITS: 100 INJECTION, SOLUTION SUBCUTANEOUS at 09:22

## 2018-07-05 RX ADMIN — HYDRALAZINE HYDROCHLORIDE 25 MG: 25 TABLET, FILM COATED ORAL at 17:50

## 2018-07-05 RX ADMIN — FOLIC ACID 1 MG: 1 TABLET ORAL at 09:22

## 2018-07-05 NOTE — PROGRESS NOTES
Cardiology Progress Note            Admit Date: 7/2/2018  Admit Diagnosis: SOB (shortness of breath)  Date: 7/5/2018     Time: 12:16 PM    Subjective:  Denies SOB. Does report one episode of \"something in chest\" last night but states it \"came and went. \"  States she ambulated in hallway with walker on  7/3 without SOB or chest pain. Reports cough. Assessment and Plan   1. Acute on chronic systolic CHF:  EF 05%, 9/0/04. NYHA IV on admission  -Continue Coreg 6.25 mg BID  -Holding on ace-I due to CKD/elevated creatinine  -Bibasilar crackles, cough today- will give lasix 40 mg IV x 1 today and hold po lasix this a.m.  -Add isorbide dinitrate 20 mg TID and Hydralazine 25 mg TID  -Daily weights and I/Os  -continue bipap at HS     2. Hx of CAD/severe multivessal disease  -Had brief episode chest discomfort last night, Add isorbide dinitrate as above  -Not candidate for CABG or PCI   -Not on statin due to intolerance  -Continue Repatha- due 7/12/18  -trivial troponin elevation (0.23) this admission likely from demand ischemia- not NSTEMI. No need for further troponins.     3. Hx of CKD: creatinine 1.14.trending down.      5. Hx of HTN;  bp elevated yesterday, ok this a.m.   -Coreg as above  -Add hydralazine, isorbide dinitrate as above     6. Hx of metastatic breast cancer:  Has seen palliative care in past.     7. Hx of DM type II    8. Anemia, chronic disease: stable 10.5     9. Advanced directives: Palliative care has seen pt in past admission. Currently partial code- no compressions, no intubation. Bibasilar crackles and cough this a.m.- will give Lasix IV 40mg x 1 today and hold po lasix. Will add hydralazine and Isorbide dinitrate as above. Cardiology attending: seen and examined. Agree with assess and plan  Creatinine improving inspite of increased diuretics. Chest basilar crackles, cv rrr no murmur, ext no edema.  With better bp will try isordil and hydralazine for low ef, still avoid ace/arb with fluctuating renal function. Objective:      Physical Exam:                Visit Vitals    /65    Pulse 84    Temp 98.8 °F (37.1 °C)    Resp 16    Ht 5' 1\" (1.549 m)    Wt 58.5 kg (128 lb 15.5 oz)    SpO2 97%    BMI 24.37 kg/m2          General Appearance:   Well developed, alert, individual in no acute distress. Ears/Nose/Mouth/Throat:    Hearing grossly normal.         Neck:  Supple. Chest:    Lungs with bibasilar crackles. Cardiovascular:   Regular rate and rhythm, S1, S2 normal, no murmur. Abdomen:    Soft, non-tender, non-distended. Extremities:  No edema bilaterally. Skin:  Warm and dry.      Telemetry: normal sinus rhythm with PACs          Data Review:    Labs:    Recent Results (from the past 24 hour(s))   GLUCOSE, POC    Collection Time: 07/04/18 11:33 AM   Result Value Ref Range    Glucose (POC) 230 (H) 65 - 100 mg/dL    Performed by Niraj Meek, POC    Collection Time: 07/04/18  4:57 PM   Result Value Ref Range    Glucose (POC) 195 (H) 65 - 100 mg/dL    Performed by Bharat JONAS    GLUCOSE, POC    Collection Time: 07/05/18 12:28 AM   Result Value Ref Range    Glucose (POC) 253 (H) 65 - 100 mg/dL    Performed by Nguyễn Barclay    LACTIC ACID    Collection Time: 07/05/18  4:24 AM   Result Value Ref Range    Lactic acid 1.0 0.4 - 2.0 MMOL/L   CBC WITH AUTOMATED DIFF    Collection Time: 07/05/18  4:24 AM   Result Value Ref Range    WBC 2.4 (L) 3.6 - 11.0 K/uL    RBC 3.75 (L) 3.80 - 5.20 M/uL    HGB 10.5 (L) 11.5 - 16.0 g/dL    HCT 32.4 (L) 35.0 - 47.0 %    MCV 86.4 80.0 - 99.0 FL    MCH 28.0 26.0 - 34.0 PG    MCHC 32.4 30.0 - 36.5 g/dL    RDW 21.7 (H) 11.5 - 14.5 %    PLATELET 311 (L) 253 - 400 K/uL    MPV 10.7 8.9 - 12.9 FL    NRBC 0.0 0  WBC    ABSOLUTE NRBC 0.00 0.00 - 0.01 K/uL    NEUTROPHILS 42 32 - 75 %    LYMPHOCYTES 40 12 - 49 %    MONOCYTES 15 (H) 5 - 13 % EOSINOPHILS 0 0 - 7 %    BASOPHILS 3 (H) 0 - 1 %    IMMATURE GRANULOCYTES 0 %    ABS. NEUTROPHILS 0.9 (L) 1.8 - 8.0 K/UL    ABS. LYMPHOCYTES 1.0 0.8 - 3.5 K/UL    ABS. MONOCYTES 0.4 0.0 - 1.0 K/UL    ABS. EOSINOPHILS 0.0 0.0 - 0.4 K/UL    ABS. BASOPHILS 0.1 0.0 - 0.1 K/UL    ABS. IMM.  GRANS. 0.0 K/UL    DF MANUAL      RBC COMMENTS ANISOCYTOSIS  2+        RBC COMMENTS POLYCHROMASIA  PRESENT        RBC COMMENTS OVALOCYTES  PRESENT       METABOLIC PANEL, BASIC    Collection Time: 07/05/18  4:24 AM   Result Value Ref Range    Sodium 137 136 - 145 mmol/L    Potassium 3.4 (L) 3.5 - 5.1 mmol/L    Chloride 102 97 - 108 mmol/L    CO2 26 21 - 32 mmol/L    Anion gap 9 5 - 15 mmol/L    Glucose 121 (H) 65 - 100 mg/dL    BUN 25 (H) 6 - 20 MG/DL    Creatinine 1.14 (H) 0.55 - 1.02 MG/DL    BUN/Creatinine ratio 22 (H) 12 - 20      GFR est AA 55 (L) >60 ml/min/1.73m2    GFR est non-AA 46 (L) >60 ml/min/1.73m2    Calcium 8.3 (L) 8.5 - 10.1 MG/DL   MAGNESIUM    Collection Time: 07/05/18  4:24 AM   Result Value Ref Range    Magnesium 1.5 (L) 1.6 - 2.4 mg/dL          Radiology:        Current Facility-Administered Medications   Medication Dose Route Frequency    hydrALAZINE (APRESOLINE) tablet 25 mg  25 mg Oral TID    furosemide (LASIX) injection 40 mg  40 mg IntraVENous ONCE    isosorbide dinitrate (ISORDIL) tablet 20 mg  20 mg Oral TID    insulin glargine (LANTUS) injection 12 Units  12 Units SubCUTAneous DAILY    furosemide (LASIX) tablet 80 mg  80 mg Oral DAILY    palbociclib cap 125 mg (Patient Supplied)  1 Cap Oral DAILY    anastrozole (ARIMIDEX) tablet 1 mg  1 mg Oral DAILY    aspirin delayed-release tablet 81 mg  81 mg Oral DAILY    carvedilol (COREG) tablet 6.25 mg  6.25 mg Oral BID WITH MEALS    clopidogrel (PLAVIX) tablet 75 mg  75 mg Oral DAILY    docusate sodium (COLACE) capsule 100 mg  100 mg Oral BID    folic acid (FOLVITE) tablet 1 mg  1 mg Oral DAILY    levothyroxine (SYNTHROID) tablet 88 mcg  88 mcg Oral ACB    nitroglycerin (NITROSTAT) tablet 0.4 mg  0.4 mg SubLINGual PRN    sodium chloride (NS) flush 5-10 mL  5-10 mL IntraVENous Q8H    sodium chloride (NS) flush 5-10 mL  5-10 mL IntraVENous PRN    glucose chewable tablet 16 g  4 Tab Oral PRN    dextrose (D50W) injection syrg 12.5-25 g  12.5-25 g IntraVENous PRN    glucagon (GLUCAGEN) injection 1 mg  1 mg IntraMUSCular PRN    insulin lispro (HUMALOG) injection   SubCUTAneous Q6H          MARIETTA Thomason NP Kevan Marking, MD         [facility] [address] [place] [address], [address] [place] [address],[address]   [facility] [place]   [phone]

## 2018-07-05 NOTE — PROGRESS NOTES
Hospitalist Progress Note  Paul Miranda MD  Answering service: [phone] OR 36 from in house phone  Cell: [phone]      Date of Service:  2018  NAME:  Orlin Yeung  :  1935  MRN:  [id number]      Admission Summary:     Ms. Lanny Funez is an 46-year-old female with past medical history of chronic respiratory failure, recurrent episodes of CHF exacerbation and pulmonary edema, history of coronary artery disease with multiple episodes of elevated troponin, history of severe multivessel disease, history of CKD stage III, anemia of chronic disease, metastatic left breast cancer, hypertension and diabetes, who presents to the hospital with the above-mentioned symptoms. The patient reports that she was recently admitted in  with shortness of breath secondary to \"fluid in her lungs. \" The patient reports that there was concern that she may have had an infection and was started on antibiotics. Regardless, the patient reports that she lives at [facility], and started having some shortness of breath yesterday. She reports that today, the shortness of breath got worse. EMS was called, and the patient was found to have a pulse oximetry of 64% on room air. EMS put the patient on CPAP and the patient's saturations got better. Interval history / Subjective:        Discussed discharge with son. He has talked to [facility] and insurance authorization is pending for her to go to [facility]. Patient feeling better in terms of her breathing.     Assessment & Plan:     Shortness of breath, secondary to acute on chronic hypoxic respiratory failure, presumed to be secondary to acute on chronic CHF exacerbation, NYHA IV on admission:  -patient at baseline and ready for discharge  -now on RA  -continue diuretics  -recent echocardiogram done on 2018, which showed systolic function that was markedly to moderately reduced, ejection fraction of 30% and a small pericardial effusion. -continue the patient on aspirin and Plavix  -Cardiology is consulted  -Strict ins and outs, daily weights     Elevated troponin to 0.23. No need to trend, per Cardiology.  -no chest pain    Diabetes mellitus type 2: Lantus, sliding scale, Accu-Cheks. Lactic acidosis, unclear etiology. Resolved. Chronic kidney disease stage III, stable. History of breast cancer, stage IV with metastasis.       Code status: Partial  DVT prophylaxis: SCDs    Care Plan discussed with: Patient/Family. Aide, Nurse  Disposition: SNF when bed available     Hospital Problems  Date Reviewed: 7/2/2018    None          Review of Systems:   Pertinent items are noted in HPI. Vital Signs:    Last 24hrs VS reviewed since prior progress note. Most recent are:  Visit Vitals    /71 (BP 1 Location: Left arm, BP Patient Position: At rest)    Pulse 81    Temp 98.7 °F (37.1 °C)    Resp 16    Ht 5' 1\" (1.549 m)    Wt 58.5 kg (128 lb 15.5 oz)    SpO2 100%    BMI 24.37 kg/m2       No intake or output data in the 24 hours ending 07/05/18 1447     Physical Examination:         Constitutional:  No acute distress, cooperative   ENT:  Neck supple    Resp:  Slightly decreased breath sounds bilaterally, improved   CV:  Regular rhythm, normal rate, normal S1 and S2    GI:  Soft, non distended, non tender, normoactive bowel sounds    Musculoskeletal:  No edema, warm    Neurologic:  Moves all extremities.   AAOx3       Data Review:    Review and/or order of clinical lab test  Review and/or order of tests in the medicine section of CPT    Labs:     Recent Labs      07/05/18 0424 07/04/18   0425   WBC  2.4*  2.4*   HGB  10.5*  10.4*   HCT  32.4*  33.0*   PLT  106*  100*     Recent Labs      07/05/18   0424  07/04/18   0425  07/03/18   0358   NA  137  138  140   K  3.4*  3.8  3.7   CL  102  106  108   CO2  26  25  24   BUN  25*  25*  22*   CREA  1.14*  1.23*  1.19*   GLU  121*  184*  73   CA  8.3*  8.7 8.7   MG  1.5*  1.7  1.7     No results for input(s): SGOT, GPT, ALT, AP, TBIL, TBILI, TP, ALB, GLOB, GGT, AML, LPSE in the last 72 hours. No lab exists for component: AMYP, HLPSE  No results for input(s): INR, PTP, APTT in the last 72 hours. No lab exists for component: INREXT, INREXT   No results for input(s): FE, TIBC, PSAT, FERR in the last 72 hours. No results found for: FOL, RBCF   No results for input(s): PH, PCO2, PO2 in the last 72 hours.   Recent Labs      07/02/18   1604   TROIQ  0.23*     Lab Results   Component Value Date/Time    Cholesterol, total 74 04/16/2018 04:21 AM    HDL Cholesterol 25 04/16/2018 04:21 AM    LDL, calculated 31.6 04/16/2018 04:21 AM    Triglyceride 87 04/16/2018 04:21 AM    CHOL/HDL Ratio 3.0 04/16/2018 04:21 AM     Lab Results   Component Value Date/Time    Glucose (POC) 226 (H) 07/05/2018 11:41 AM    Glucose (POC) 253 (H) 07/05/2018 12:28 AM    Glucose (POC) 195 (H) 07/04/2018 04:57 PM    Glucose (POC) 230 (H) 07/04/2018 11:33 AM    Glucose (POC) 143 (H) 07/04/2018 06:58 AM     Lab Results   Component Value Date/Time    Color YELLOW/STRAW 07/02/2018 04:04 PM    Appearance CLEAR 07/02/2018 04:04 PM    Specific gravity 1.009 07/02/2018 04:04 PM    pH (UA) 6.0 07/02/2018 04:04 PM    Protein 30 (A) 07/02/2018 04:04 PM    Glucose NEGATIVE  07/02/2018 04:04 PM    Ketone NEGATIVE  07/02/2018 04:04 PM    Bilirubin NEGATIVE  07/02/2018 04:04 PM    Urobilinogen 0.2 07/02/2018 04:04 PM    Nitrites NEGATIVE  07/02/2018 04:04 PM    Leukocyte Esterase MODERATE (A) 07/02/2018 04:04 PM    Epithelial cells FEW 07/02/2018 04:04 PM    Bacteria NEGATIVE  07/02/2018 04:04 PM    WBC 10-20 07/02/2018 04:04 PM    RBC 5-10 07/02/2018 04:04 PM         Medications Reviewed:     Current Facility-Administered Medications   Medication Dose Route Frequency    hydrALAZINE (APRESOLINE) tablet 25 mg  25 mg Oral TID    isosorbide dinitrate (ISORDIL) tablet 20 mg  20 mg Oral TID    insulin glargine (LANTUS) injection 12 Units  12 Units SubCUTAneous DAILY    furosemide (LASIX) tablet 80 mg  80 mg Oral DAILY    palbociclib cap 125 mg (Patient Supplied)  1 Cap Oral DAILY    anastrozole (ARIMIDEX) tablet 1 mg  1 mg Oral DAILY    aspirin delayed-release tablet 81 mg  81 mg Oral DAILY    carvedilol (COREG) tablet 6.25 mg  6.25 mg Oral BID WITH MEALS    clopidogrel (PLAVIX) tablet 75 mg  75 mg Oral DAILY    docusate sodium (COLACE) capsule 100 mg  100 mg Oral BID    folic acid (FOLVITE) tablet 1 mg  1 mg Oral DAILY    levothyroxine (SYNTHROID) tablet 88 mcg  88 mcg Oral ACB    nitroglycerin (NITROSTAT) tablet 0.4 mg  0.4 mg SubLINGual PRN    sodium chloride (NS) flush 5-10 mL  5-10 mL IntraVENous Q8H    sodium chloride (NS) flush 5-10 mL  5-10 mL IntraVENous PRN    glucose chewable tablet 16 g  4 Tab Oral PRN    dextrose (D50W) injection syrg 12.5-25 g  12.5-25 g IntraVENous PRN    glucagon (GLUCAGEN) injection 1 mg  1 mg IntraMUSCular PRN    insulin lispro (HUMALOG) injection   SubCUTAneous Q6H     ______________________________________________________________________  EXPECTED LENGTH OF STAY: 3d 16h  ACTUAL LENGTH OF STAY:          3                 Jocy Harper MD

## 2018-07-05 NOTE — PROGRESS NOTES
Care Management Interventions  PCP Verified by CM: Yes (Dr Jennifer Forrester )  Mode of Transport at Discharge:  (tbd)  MyChart Signup: No  Discharge Durable Medical Equipment: No  Physical Therapy Consult: Yes  Occupational Therapy Consult: Yes  Speech Therapy Consult: No  Current Support Network: Own Home, Has Personal Caregivers  Confirm Follow Up Transport: Family  Plan discussed with Pt/Family/Caregiver: Yes  Freedom of Choice Offered: Yes  [employer] Provided?: No  Discharge Location  Discharge Placement: Skilled nursing facility    CM reviewed chart and noted transfer to [address] [place] [address]. Per chart, pt was at [facility] for rehab. Pt usually lives at home with personal caregivers. Pt's [facility], both facilities are reviewing clinicals. Cm will continue to follow.   Norris Bauman, FREDOW, ACM

## 2018-07-05 NOTE — PROGRESS NOTES
CM reviewed chart and met with son to discuss options. [facility] has excepted patient pending insurance authorization, they have started process. Son is in agreement.    JEFF [place]<,ACM

## 2018-07-05 NOTE — PROGRESS NOTES
Problem: Mobility Impaired (Adult and Pediatric)  Goal: *Acute Goals and Plan of Care (Insert Text)  Physical Therapy Goals  Initiated 7/3/2018  1. Patient will move from supine to sit and sit to supine  and roll side to side in bed with independence within 7 day(s). 2.  Patient will transfer from bed to chair and chair to bed with independence using the least restrictive device within 7 day(s). 3.  Patient will perform sit to stand with independence within 7 day(s). 4.  Patient will ambulate with modified independence for 150 feet with the least restrictive device within 7 day(s). 5.  Patient will ascend/descend 5 stairs with 0 handrail(s) with minimal assistance/contact guard assist within 7 day(s). physical Therapy TREATMENT  Patient: Jeffry Pleitez (82 y.o. female)  Date: 7/5/2018  Diagnosis: SOB (shortness of breath) SOB (shortness of breath)       Precautions: Fall  Chart, physical therapy assessment, plan of care and goals were reviewed. ASSESSMENT:  Pt cleared by RN for PT; pt agreeable. Pt participated in gait training w/ RW (CGA)). Amb ~35 FT (x2), noted path deviations bilaterally, and increased shift to R. Pt amb w/ decreased step clearance and shuffled beverly w/ step to gait. Pt returned to bed at completion of session. Will continue to follow. Progression toward goals:  [x]    Improving appropriately and progressing toward goals  []    Improving slowly and progressing toward goals  []    Not making progress toward goals and plan of care will be adjusted     PLAN:  Patient continues to benefit from skilled intervention to address the above impairments. Continue treatment per established plan of care. Discharge Recommendations:  Home Health w/daily assist/support and [facility]  Further Equipment Recommendations for Discharge:  TBD     SUBJECTIVE:   Patient stated Oh, I thought this was my room.  Pt also reported recent experience she had at Rehab facility; reported her experience was not a pleasant one. OBJECTIVE DATA SUMMARY:   Critical Behavior:  Neurologic State: Alert  Orientation Level: Oriented X4  Cognition: Appropriate decision making, Follows commands  Safety/Judgement: Awareness of environment, Fall prevention  Functional Mobility Training:  Bed Mobility:     Supine to Sit: Stand-by assistance  Sit to Supine: Stand-by assistance           Transfers:  Sit to Stand: Contact guard assistance  Stand to Sit: Contact guard assistance                             Balance:     Ambulation/Gait Training:  Distance (ft): 35 Feet (ft) (x2)  Assistive Device: Gait belt;Walker, rolling  Ambulation - Level of Assistance: Contact guard assistance;Assist x2        Gait Abnormalities: Decreased step clearance; Foot drop;Path deviations; Step to gait (R foot drop)        Base of Support: Narrowed  Stance: Right decreased  Speed/Haylee: Pace decreased (<100 feet/min); Shuffled; Slow  Step Length: Left shortened;Right shortened                                 Pain:                    Activity Tolerance:   No c/o dizziness/lightheadedness. Pt able to hold conversation w/ activity. Minimal REDDY. Please refer to the flowsheet for vital signs taken during this treatment.   After treatment:   []    Patient left in no apparent distress sitting up in chair   [x]    Patient left in no apparent distress in bed  [x]    Call bell left within reach  [x]    Nursing notified  []    Caregiver present  []    Bed alarm activated    COMMUNICATION/COLLABORATION:   The patients plan of care was discussed with: Registered Nurse    Tere StrattonPTA   Time Calculation: 14 mins

## 2018-07-05 NOTE — DIABETES MGMT
DTC Progress Note    Recommendations/ Comments: Chart reviewed due to hyperglycemia. Blood sugars mainly >200. If appropriate, please consider adding home dose of Amaryl 1 mg at breakfast.     Current hospital DM medication: Lantus 12 units and correction scale Humalog, high sensitivity. Chart reviewed on [facility]. Patient is a 82 y.o. female with known diabetes on Amaryl 1 mg ar breakfast and Lantus 10 units at home. A1c:   Lab Results   Component Value Date/Time    Hemoglobin A1c 8.0 (H) 07/02/2018 09:40 AM    Hemoglobin A1c 7.0 (H) 04/18/2018 08:14 AM       Recent Glucose Results: Lab Results   Component Value Date/Time     (H) 07/05/2018 04:24 AM    GLUCPOC 226 (H) 07/05/2018 11:41 AM    GLUCPOC 253 (H) 07/05/2018 12:28 AM    GLUCPOC 195 (H) 07/04/2018 04:57 PM        Lab Results   Component Value Date/Time    Creatinine 1.14 (H) 07/05/2018 04:24 AM     Estimated Creatinine Clearance: 31.8 mL/min (based on Cr of 1.14). Active Orders   Diet    DIET CARDIAC Regular; Consistent Carb 1500-1600kcal        PO intake: Patient Vitals for the past 72 hrs:   % Diet Eaten   07/04/18 0800 75 %       Will continue to follow as needed.     Thank you  Chele Jernigan RD, CDE

## 2018-07-05 NOTE — PROGRESS NOTES
Bedside and Verbal shift change report given to Annel (oncoming nurse) by Wyatt Bal (offgoing nurse). Report included the following information SBAR, Kardex and MAR.

## 2018-07-05 NOTE — PROGRESS NOTES
Pt received supine in bed. Personal care partner present and reported that pt had just returned back to bed after being up 2x today. Care partner asked if OT could let her rest. Will f/u tomorrow.

## 2018-07-05 NOTE — PROGRESS NOTES
Problem: Falls - Risk of  Goal: *Absence of Falls  Document Yaron Fall Risk and appropriate interventions in the flowsheet.    Outcome: Progressing Towards Goal  Fall Risk Interventions:  Mobility Interventions: Patient to call before getting OOB         Medication Interventions: Evaluate medications/consider consulting pharmacy    Elimination Interventions: Call light in reach, Patient to call for help with toileting needs, Toileting schedule/hourly rounds

## 2018-07-06 ENCOUNTER — APPOINTMENT (OUTPATIENT)
Dept: GENERAL RADIOLOGY | Age: 83
DRG: 291 | End: 2018-07-06
Attending: NURSE PRACTITIONER
Payer: MEDICARE

## 2018-07-06 VITALS
HEART RATE: 91 BPM | HEIGHT: 61 IN | DIASTOLIC BLOOD PRESSURE: 75 MMHG | BODY MASS INDEX: 22.6 KG/M2 | WEIGHT: 119.7 LBS | SYSTOLIC BLOOD PRESSURE: 157 MMHG | RESPIRATION RATE: 16 BRPM | TEMPERATURE: 99.5 F | OXYGEN SATURATION: 96 %

## 2018-07-06 LAB
ANION GAP SERPL CALC-SCNC: 10 MMOL/L (ref 5–15)
BUN SERPL-MCNC: 26 MG/DL (ref 6–20)
BUN/CREAT SERPL: 21 (ref 12–20)
CALCIUM SERPL-MCNC: 9.1 MG/DL (ref 8.5–10.1)
CHLORIDE SERPL-SCNC: 102 MMOL/L (ref 97–108)
CO2 SERPL-SCNC: 27 MMOL/L (ref 21–32)
CREAT SERPL-MCNC: 1.25 MG/DL (ref 0.55–1.02)
GLUCOSE BLD STRIP.AUTO-MCNC: 114 MG/DL (ref 65–100)
GLUCOSE BLD STRIP.AUTO-MCNC: 223 MG/DL (ref 65–100)
GLUCOSE BLD STRIP.AUTO-MCNC: 237 MG/DL (ref 65–100)
GLUCOSE SERPL-MCNC: 116 MG/DL (ref 65–100)
LACTATE SERPL-SCNC: 0.9 MMOL/L (ref 0.4–2)
LACTATE SERPL-SCNC: 1.2 MMOL/L (ref 0.4–2)
MAGNESIUM SERPL-MCNC: 1.5 MG/DL (ref 1.6–2.4)
POTASSIUM SERPL-SCNC: 3.8 MMOL/L (ref 3.5–5.1)
SERVICE CMNT-IMP: ABNORMAL
SODIUM SERPL-SCNC: 139 MMOL/L (ref 136–145)

## 2018-07-06 PROCEDURE — 82962 GLUCOSE BLOOD TEST: CPT

## 2018-07-06 PROCEDURE — 83605 ASSAY OF LACTIC ACID: CPT | Performed by: FAMILY MEDICINE

## 2018-07-06 PROCEDURE — 74011636637 HC RX REV CODE- 636/637: Performed by: FAMILY MEDICINE

## 2018-07-06 PROCEDURE — 74011250637 HC RX REV CODE- 250/637: Performed by: FAMILY MEDICINE

## 2018-07-06 PROCEDURE — 74011250637 HC RX REV CODE- 250/637: Performed by: INTERNAL MEDICINE

## 2018-07-06 PROCEDURE — 71046 X-RAY EXAM CHEST 2 VIEWS: CPT

## 2018-07-06 PROCEDURE — 83735 ASSAY OF MAGNESIUM: CPT | Performed by: NURSE PRACTITIONER

## 2018-07-06 PROCEDURE — 74011636637 HC RX REV CODE- 636/637: Performed by: INTERNAL MEDICINE

## 2018-07-06 PROCEDURE — 80048 BASIC METABOLIC PNL TOTAL CA: CPT | Performed by: NURSE PRACTITIONER

## 2018-07-06 PROCEDURE — 74011250636 HC RX REV CODE- 250/636: Performed by: NURSE PRACTITIONER

## 2018-07-06 PROCEDURE — 36415 COLL VENOUS BLD VENIPUNCTURE: CPT | Performed by: FAMILY MEDICINE

## 2018-07-06 RX ORDER — GLIMEPIRIDE 2 MG/1
1 TABLET ORAL
Status: DISCONTINUED | OUTPATIENT
Start: 2018-07-07 | End: 2018-07-06

## 2018-07-06 RX ORDER — MAGNESIUM SULFATE HEPTAHYDRATE 40 MG/ML
2 INJECTION, SOLUTION INTRAVENOUS ONCE
Status: DISCONTINUED | OUTPATIENT
Start: 2018-07-06 | End: 2018-07-06 | Stop reason: HOSPADM

## 2018-07-06 RX ORDER — FUROSEMIDE 40 MG/1
80 TABLET ORAL DAILY
Qty: 60 TAB | Refills: 0 | Status: SHIPPED | OUTPATIENT
Start: 2018-07-06 | End: 2018-09-18 | Stop reason: SDUPTHER

## 2018-07-06 RX ORDER — GLIMEPIRIDE 2 MG/1
1 TABLET ORAL
Status: DISCONTINUED | OUTPATIENT
Start: 2018-07-06 | End: 2018-07-06 | Stop reason: HOSPADM

## 2018-07-06 RX ORDER — FUROSEMIDE 10 MG/ML
40 INJECTION INTRAMUSCULAR; INTRAVENOUS ONCE
Status: COMPLETED | OUTPATIENT
Start: 2018-07-06 | End: 2018-07-06

## 2018-07-06 RX ADMIN — CARVEDILOL 6.25 MG: 6.25 TABLET, FILM COATED ORAL at 07:24

## 2018-07-06 RX ADMIN — INSULIN LISPRO 2 UNITS: 100 INJECTION, SOLUTION INTRAVENOUS; SUBCUTANEOUS at 11:53

## 2018-07-06 RX ADMIN — ANASTROZOLE 1 MG: 1 TABLET, COATED ORAL at 09:43

## 2018-07-06 RX ADMIN — DOCUSATE SODIUM 100 MG: 100 CAPSULE, LIQUID FILLED ORAL at 09:40

## 2018-07-06 RX ADMIN — CLOPIDOGREL BISULFATE 75 MG: 75 TABLET ORAL at 09:40

## 2018-07-06 RX ADMIN — INSULIN LISPRO 2 UNITS: 100 INJECTION, SOLUTION INTRAVENOUS; SUBCUTANEOUS at 01:14

## 2018-07-06 RX ADMIN — INSULIN GLARGINE 12 UNITS: 100 INJECTION, SOLUTION SUBCUTANEOUS at 11:53

## 2018-07-06 RX ADMIN — Medication 10 ML: at 15:18

## 2018-07-06 RX ADMIN — Medication 10 ML: at 07:24

## 2018-07-06 RX ADMIN — LEVOTHYROXINE SODIUM 88 MCG: 88 TABLET ORAL at 07:24

## 2018-07-06 RX ADMIN — ASPIRIN 81 MG: 81 TABLET, COATED ORAL at 09:40

## 2018-07-06 RX ADMIN — FUROSEMIDE 40 MG: 10 INJECTION, SOLUTION INTRAMUSCULAR; INTRAVENOUS at 09:39

## 2018-07-06 RX ADMIN — FOLIC ACID 1 MG: 1 TABLET ORAL at 09:40

## 2018-07-06 NOTE — PROGRESS NOTES
Physical Therapy  7/6/2018    Chart reviewed. Attempted to pt this afternoon however pt HERBIE for Chest X-Ray per family member (DTR) who was sitting at bedside. She also reported pt is to d/c around 5PM to rehab (SNF). If there are any changes in d/c, PT to follow up Monday.      Monika Means, PTA

## 2018-07-06 NOTE — PROGRESS NOTES
Patient was discharge, and the DC instructions were given to pt's son Gonzales Arreola. He reported no further questions and will call us back if he has any concers.

## 2018-07-06 NOTE — DISCHARGE SUMMARY
Discharge Summary       PATIENT ID: Emma Giang  MRN: [id number]   YOB: 1935    DATE OF ADMISSION: 7/2/2018  9:24 AM    DATE OF DISCHARGE: 7/6/2018  PRIMARY CARE PROVIDER: Austin Coreas MD     DISCHARGING PROVIDER: Ricarda Cr MD    To contact this individual call [phone] and ask the  to page. If unavailable ask to be transferred the Adult Hospitalist Department. CONSULTATIONS: None    PROCEDURES/SURGERIES: * No surgery found *    ADMITTING DIAGNOSES & HOSPITAL COURSE:     Ms. Gerardo Gonzalez is an 16-year-old female with past medical history of chronic respiratory failure, recurrent episodes of CHF exacerbation and pulmonary edema, history of coronary artery disease with multiple episodes of elevated troponin, history of severe multivessel disease, history of CKD stage III, anemia of chronic disease, metastatic left breast cancer, hypertension and diabetes, who presents to the hospital with the above-mentioned symptoms. The patient reports that she was recently admitted in June with shortness of breath secondary to \"fluid in her lungs. \" The patient reports that there was concern that she may have had an infection and was started on antibiotics. Regardless, the patient reports that she lives at [facility], and started having some shortness of breath yesterday. She reports that today, the shortness of breath got worse. EMS was called, and the patient was found to have a pulse oximetry of 64% on room air. EMS put the patient on CPAP and the patient's saturations got better.      Patient was found to have exacerbation of her heart failure, as above. After a few days of IV diuresis her discharge CXR showed marked reduction in her pleural effusions. Patient satting well on RA and safe for discharge to SNF. Advised son that she will need follow-up with Cardiology, PCP, and Oncology.     DISCHARGE DIAGNOSES / PLAN:      Shortness of breath, secondary to acute on chronic hypoxic respiratory failure, presumed to be secondary to acute on chronic CHF exacerbation, NYHA IV on admission, now NYHA II-III:  -patient at baseline and ready for discharge  -now on RA for 2 days  -continue PO diuretics  -recent echocardiogram done on 06/01/2018, which showed systolic function that was markedly to moderately reduced, ejection fraction of 30% and a small pericardial effusion  -continue the patient on aspirin and Plavix  -Cardiology follow-up     Elevated troponin to 0.23. No need to trend, per Cardiology.  -no chest pain     Diabetes mellitus type 2: Continue Lantus, sliding scale as an outpatient     Lactic acidosis, unclear etiology.  Resolved.     Chronic kidney disease stage III, stable.       History of breast cancer, stage IV with metastasis.         PENDING TEST RESULTS:   At the time of discharge the following test results are still pending: None    FOLLOW UP APPOINTMENTS:    Follow-up Information     Follow up With Details Comments Contact Info    Oli Zayas MD Schedule an appointment as soon as possible for a visit  [address] [place] [address] Str.  [phone]           ADDITIONAL CARE RECOMMENDATIONS:     DIET: Cardiac Diet and Diabetic Diet    ACTIVITY: Activity as tolerated    DISCHARGE MEDICATIONS:  Current Discharge Medication List      CONTINUE these medications which have CHANGED    Details   furosemide (LASIX) 40 mg tablet Take 2 Tabs by mouth daily. Qty: 60 Tab, Refills: 0         CONTINUE these medications which have NOT CHANGED    Details   b-complex with vitamin c tablet Take 1 Tab by mouth daily. Qty: 30 Tab, Refills: 0      insulin glargine (LANTUS) 100 unit/mL injection 10 Units by SubCUTAneous route daily. Indications: type 2 diabetes mellitus  Qty: 1 Vial, Refills: 0      clopidogrel (PLAVIX) 75 mg tab Take 1 Tab by mouth daily. Qty: 60 Tab, Refills: 0      carvedilol (COREG) 6.25 mg tablet Take 1 Tab by mouth two (2) times daily (with meals).   Qty: 90 Tab, Refills: 0      anastrozole (ARIMIDEX) 1 mg tablet Take 1 Tab by mouth daily. Qty: 90 Tab, Refills: 0      docusate sodium (COLACE) 100 mg capsule Take 1 Cap by mouth two (2) times a day for 90 days. Qty: 60 Cap, Refills: 2      L. acidoph & paracasei- S therm- Bifido (BERYL-Q/RISAQUAD) 8 billion cell cap cap Take 1 Cap by mouth daily. Qty: 90 Cap, Refills: 0      levothyroxine (SYNTHROID) 88 mcg tablet Take 88 mcg by mouth Daily (before breakfast). aspirin delayed-release 81 mg tablet Take 81 mg by mouth daily. ascorbate calcium (MAKAYLA-C) 500 mg Tab Take 1,000 mg by mouth daily. CHOLECALCIFEROL, VITAMIN D3, (VITAMIN D-3 PO) Take 1,000 Units by mouth daily. ondansetron (ZOFRAN ODT) 4 mg disintegrating tablet Take 4 mg by mouth every four (4) hours as needed for Nausea. acetaminophen (TYLENOL) 325 mg tablet Take 2 Tabs by mouth every four (4) hours as needed. Qty: 10 Tab, Refills: 0      methyl salicylate-menthol (BENGAY) 15-10 % topical cream Apply  to affected area as needed for Pain. APPLY TO right shoulder    Nursing, document site in comments  Qty: 1 Tube, Refills: 0      simethicone (MYLICON) 80 mg chewable tablet Take 1 Tab by mouth as needed. Indications: FLATULENCE  Qty: 10 Tab, Refills: 0      nitroglycerin (NITROSTAT) 0.4 mg SL tablet 1 Tab by SubLINGual route as needed for Chest Pain. Up to 3 doses. Qty: 40 Tab, Refills: 0      polyethylene glycol (MIRALAX) 17 gram packet Take 1 Packet by mouth daily. Qty: 1 Each, Refills: 0      folic acid (FOLVITE) 1 mg tablet TAKE ONE TABLET BY MOUTH DAILY  Qty: 90 Tab, Refills: 2    Associated Diagnoses: Seropositive rheumatoid arthritis of multiple sites (HCC)      VIT C/VIT E/LUTEIN/MIN/OMEGA-3 (OCUVITE PO) Take 1 Tab by mouth daily. MULTIVITAMIN WITH MINERALS (HAIR,SKIN & NAILS PO) Take 1 Tab by mouth daily. OMEGA-3 FATTY ACIDS/FISH OIL (OMEGA 3 FISH OIL PO) Take 300 mg by mouth daily.          STOP taking these medications glimepiride (AMARYL) 1 mg tablet Comments:   Reason for Stopping:         FORTEO 20 mcg/dose - 600 mcg/2.4 mL pnij injection Comments:   Reason for Stopping:         palbociclib (IBRANCE) 125 mg cap Comments:   Reason for Stopping:         epoetin celio (EPOGEN;PROCRIT) 10,000 unit/mL injection Comments:   Reason for Stopping:         etanercept (ENBREL) 50 mg/mL (0.98 mL) injection Comments:   Reason for Stopping:         evolocumab (Madeline Siemens) pen injection Comments:   Reason for Stopping:               NOTIFY YOUR PHYSICIAN FOR ANY OF THE FOLLOWING:   Fever over 101 degrees for 24 hours. Chest pain, shortness of breath, fever, chills, nausea, vomiting, diarrhea, change in mentation, falling, weakness, bleeding. Severe pain or pain not relieved by medications. Or, any other signs or symptoms that you may have questions about. DISPOSITION:    Home With:   OT  PT  HH  RN       Long term SNF/Inpatient Rehab    Independent/assisted living    Hospice    Other:       PATIENT CONDITION AT DISCHARGE:     Functional status    Poor     Deconditioned     Independent      Cognition     Lucid     Forgetful     Dementia      Code status     Full code     Partial code     PHYSICAL EXAMINATION AT DISCHARGE:    Constitutional:  No acute distress, cooperative   ENT:  Neck supple    Resp:  Slightly decreased breath sounds bilaterally, mild crackles at bases. Normal WOB   CV:  Regular rhythm, normal rate, normal S1 and S2    GI:  Soft, non distended, non tender, normoactive bowel sounds    Musculoskeletal:  No edema, warm    Neurologic:  Moves all extremities.   AAOx3       CHRONIC MEDICAL DIAGNOSES:  Problem List as of 7/6/2018  Date Reviewed: 7/2/2018          Codes Class Noted - Resolved    Acute respiratory failure ([facility] ICD-10-CM: J96.00  ICD-9-CM: 518.81  6/21/2018 - Present        Elevated troponin ICD-10-CM: R74.8  ICD-9-CM: 790.6  6/21/2018 - Present        CAD (coronary artery disease) ICD-10-CM: I25.10  ICD-9-CM: 414.00  6/21/2018 - Present        Acute renal failure superimposed on stage 3 chronic kidney disease (HCC) ICD-10-CM: N17.9, N18.3  ICD-9-CM: 584.9, 585.3  6/21/2018 - Present        Gout flare ICD-10-CM: M10.9  ICD-9-CM: 274.01  6/21/2018 - Present        Hyperglycemia due to type 2 diabetes mellitus ([facility] ICD-10-CM: E11.65  ICD-9-CM: 250.00  6/21/2018 - Present        Metastatic breast cancer ([facility] ICD-10-CM: C50.919  ICD-9-CM: 174.9  6/1/2018 - Present        Goals of care, counseling/discussion ICD-10-CM: Z71.89  ICD-9-CM: V65.49  6/1/2018 - Present        Acute on chronic systolic HF (heart failure) (HCC) ICD-10-CM: I50.23  ICD-9-CM: 428.23  5/19/2018 - Present        Acute systolic heart failure (HCC) ICD-10-CM: I50.21  ICD-9-CM: 428.21  4/24/2018 - Present        Altered mental status, unspecified ICD-10-CM: R41.82  ICD-9-CM: 780.97  4/23/2018 - Present        CKD (chronic kidney disease) stage 3, GFR 30-59 ml/min ICD-10-CM: N18.3  ICD-9-CM: 585.3  10/25/2017 - Present        Vitamin D deficiency ICD-10-CM: E55.9  ICD-9-CM: 268.9  6/16/2017 - Present        Asymptomatic hyperuricemia ICD-10-CM: E79.0  ICD-9-CM: 790.6  2/16/2017 - Present        Statin intolerance ICD-10-CM: Z78.9  ICD-9-CM: 995.27  12/21/2016 - Present        Long-term use of immunosuppressant medication ICD-10-CM: Z79.899  ICD-9-CM: V58.69  11/21/2016 - Present        Seropositive rheumatoid arthritis of multiple sites [facility]) ICD-10-CM: M05.79  ICD-9-CM: 714.0  9/28/2016 - Present        Primary osteoarthritis of both knees ICD-10-CM: M17.0  ICD-9-CM: 715.16  9/28/2016 - Present        Occlusion and stenosis of carotid artery without mention of cerebral infarction ICD-10-CM: I65.29  ICD-9-CM: 433.10  8/23/2013 - Present        Weakness due to cerebrovascular accident ICD-10-CM: CTO4825  ICD-9-CM: [place]  4/2/2012 - Present        Neuropathy in diabetes [facility]) ICD-10-CM: E11.40  ICD-9-CM: 250.60, 357.2  4/2/2012 - Present PAD (peripheral artery disease) (HCC) ICD-10-CM: I73.9  ICD-9-CM: 443.9  9/7/2011 - Present        Carotid bruit ICD-10-CM: R09.89  ICD-9-CM: 785.9  8/31/2011 - Present        Claudication ([facility] ICD-10-CM: I73.9  ICD-9-CM: 443.9  8/31/2011 - Present        Weakness of left arm ICD-10-CM: R29.898  ICD-9-CM: 729.89  8/31/2011 - Present        Hyperlipidemia ICD-10-CM: E78.5  ICD-9-CM: 272.4  1/27/2010 - Present        DM (diabetes mellitus) ([place] [address].) ICD-10-CM: E11.9  ICD-9-CM: 250.00  9/2/2009 - Present        Hypothyroid ICD-10-CM: E03.9  ICD-9-CM: 244.9  9/2/2009 - Present        Colon cancer ([place] [address].) ICD-10-CM: C18.9  ICD-9-CM: 153.9  9/2/2009 - Present        H/O: CVA ICD-9-CM: V12.54  9/2/2009 - Present        Microalbuminuria ICD-10-CM: R80.9  ICD-9-CM: 791.0  9/2/2009 - Present        Age-related osteoporosis without current pathological fracture ICD-10-CM: M81.0  ICD-9-CM: 733.01  9/2/2009 - Present        Essential hypertension ICD-10-CM: I10  ICD-9-CM: 401.9  9/2/2009 - Present        Anemia ICD-10-CM: D64.9  ICD-9-CM: 285.9  9/2/2009 - Present        RESOLVED: Fluid overload ICD-10-CM: E87.70  ICD-9-CM: 276.69  6/1/2018 - 6/18/2018        * (Principal)RESOLVED: SOB (shortness of breath) ICD-10-CM: R06.02  ICD-9-CM: 786.05  4/16/2018 - 7/4/2018              Greater than 30 minutes were spent with the patient on counseling and coordination of care    Signed:   Jovanna Gonzáles MD  7/6/2018  4:01 PM

## 2018-07-06 NOTE — PROGRESS NOTES
Cardiology Progress Note            Admit Date: 7/2/2018  Admit Diagnosis: SOB (shortness of breath)  Date: 7/6/2018     Time: 12:16 PM    Subjective:  Denies SOB at rest. States she ambulated yesterday with PT and experienced some SOB at end of walk. States cough has improved. Assessment and Plan   1. Acute on chronic systolic CHF:  EF 28%, 0/6/06. NYHA IV on admission. NYHA III now.  -Continue Coreg 6.25 mg BID  -Holding on ace-I due to CKD/elevated creatinine  -Pulmonary  exam with Crackles bilaterally noted. - will give another dose lasix 40 mg IV x 1 today and hold po lasix this a.m. Check PA/Lateral CXR  -Stop Isorbide and hydralazine as bp now low-low NL. BP will not tolerate  -Daily weights and I/Os- requested Standing scale weights.  -continue bipap at HS     2. Hx of CAD/severe multivessal disease  -Not candidate for CABG or PCI   -Continue ASA, plavix  -Not on statin due to intolerance  -Continue Repatha- due 7/12/18  -trivial troponin elevation (0.23) this admission likely from demand ischemia- not NSTEMI. No need for further troponins.  -Partial code- no compressions no intubation     3. Hx of CKD: AM labs pending.     5. Hx of HTN;  bp low-low NL  -Coreg as above  -stop hydralazine/isordil    6. Hx of metastatic breast cancer:  Has seen palliative care in past.     7. Hx of DM type II    8. Anemia, chronic disease: stable 10.5     9. Advanced directives: Palliative care has seen pt in past admission. Currently partial code- no compressions, no intubation. Crackles present Left 1/2 lung field, right base. Will give additional Lasix 40 mg IV x 1 today and hold PO lasix. Will obtain CXR. AM labs pending. Would hold discharge today. Cardiology attending: seen and examined. Agree with assess and plan  She is steadily improving. Rales persist. No edema. Chest xray and iv lasix today.    Objective:      Physical Exam:                Visit Vitals    /56    Pulse 86    Temp 98.3 °F (36.8 °C)    Resp 16    Ht 5' 1\" (1.549 m)    Wt 58.2 kg (128 lb 4.9 oz)    SpO2 93%    BMI 24.24 kg/m2          General Appearance:   Well developed, alert, individual in no acute distress. Ears/Nose/Mouth/Throat:    Hearing grossly normal.         Neck:  Supple. Chest:    Left lung field crackles 1/2 lower lung field. Right basilar crackles noted. Cardiovascular:   Regular rate and rhythm, S1, S2 normal, no murmur. Abdomen:    Soft, non-tender, non-distended. Extremities:  No edema bilaterally. Skin:  Warm and dry.      Telemetry: normal sinus rhythm with PACs          Data Review:    Labs:    Recent Results (from the past 24 hour(s))   GLUCOSE, POC    Collection Time: 07/05/18 11:41 AM   Result Value Ref Range    Glucose (POC) 226 (H) 65 - 100 mg/dL    Performed by Efra Candelario, POC    Collection Time: 07/05/18  4:15 PM   Result Value Ref Range    Glucose (POC) 235 (H) 65 - 100 mg/dL    Performed by Beny Verma    GLUCOSE, POC    Collection Time: 07/05/18  7:31 PM   Result Value Ref Range    Glucose (POC) 334 (H) 65 - 100 mg/dL    Performed by [address] [place] [address], POC    Collection Time: 07/05/18 10:41 PM   Result Value Ref Range    Glucose (POC) 243 (H) 65 - 100 mg/dL    Performed by Jessica Webb    LACTIC ACID    Collection Time: 07/06/18  5:02 AM   Result Value Ref Range    Lactic acid 0.9 0.4 - 2.0 MMOL/L   GLUCOSE, POC    Collection Time: 07/06/18  7:23 AM   Result Value Ref Range    Glucose (POC) 114 (H) 65 - 100 mg/dL    Performed by Jessica Webb           Radiology:        Current Facility-Administered Medications   Medication Dose Route Frequency    furosemide (LASIX) injection 40 mg  40 mg IntraVENous ONCE    insulin glargine (LANTUS) injection 12 Units  12 Units SubCUTAneous DAILY    furosemide (LASIX) tablet 80 mg  80 mg Oral DAILY    palbociclib cap 125 mg (Patient Supplied) 1 Cap Oral DAILY    anastrozole (ARIMIDEX) tablet 1 mg  1 mg Oral DAILY    aspirin delayed-release tablet 81 mg  81 mg Oral DAILY    carvedilol (COREG) tablet 6.25 mg  6.25 mg Oral BID WITH MEALS    clopidogrel (PLAVIX) tablet 75 mg  75 mg Oral DAILY    docusate sodium (COLACE) capsule 100 mg  100 mg Oral BID    folic acid (FOLVITE) tablet 1 mg  1 mg Oral DAILY    levothyroxine (SYNTHROID) tablet 88 mcg  88 mcg Oral ACB    nitroglycerin (NITROSTAT) tablet 0.4 mg  0.4 mg SubLINGual PRN    sodium chloride (NS) flush 5-10 mL  5-10 mL IntraVENous Q8H    sodium chloride (NS) flush 5-10 mL  5-10 mL IntraVENous PRN    glucose chewable tablet 16 g  4 Tab Oral PRN    dextrose (D50W) injection syrg 12.5-25 g  12.5-25 g IntraVENous PRN    glucagon (GLUCAGEN) injection 1 mg  1 mg IntraMUSCular PRN    insulin lispro (HUMALOG) injection   SubCUTAneous Q6H          Marli [place]. MARIETTA Montana. MARIETTA Montana.  MARIETTA Celestin MD         [facility] [address] [place] [address], [address] [place] [address] [address]   [address] [facility]   [phone]

## 2018-07-06 NOTE — PROGRESS NOTES
Report was given to Mehdi Blevins from [facility]. She was given our phone number and was asked to call us back if she has any remaining questions.

## 2018-07-06 NOTE — PROGRESS NOTES
Bedside and Verbal shift change report given to Annel (oncoming nurse) by Dalila Florez (offgoing nurse). Report included the following information SBAR, Kardex, MAR and Accordion.

## 2018-07-06 NOTE — DIABETES MGMT
DTC Progress Note    Recommendations/ Comments: Chart reviewed due to hyperglycemia. If appropriate, please consider adding home dose of Amaryl 1 mg at breakfast.     Current hospital DM medication: Lantus 12 units and correction scale Humalog, high sensitivity. Chart reviewed on Sandy Ledesma. Patient is a 82 y.o. female with known diabetes on Amaryl 1 mg ar breakfast and Lantus 10 units at home. A1c:   Lab Results   Component Value Date/Time    Hemoglobin A1c 8.0 (H) 07/02/2018 09:40 AM    Hemoglobin A1c 7.0 (H) 04/18/2018 08:14 AM       Recent Glucose Results:   Lab Results   Component Value Date/Time     (H) 07/06/2018 08:36 AM    GLUCPOC 114 (H) 07/06/2018 07:23 AM    GLUCPOC 243 (H) 07/05/2018 10:41 PM    GLUCPOC 334 (H) 07/05/2018 07:31 PM        Lab Results   Component Value Date/Time    Creatinine 1.25 (H) 07/06/2018 08:36 AM     Estimated Creatinine Clearance: 26.6 mL/min (based on Cr of 1.25). Active Orders   Diet    DIET CARDIAC Regular; Consistent Carb 1500-1600kcal        PO intake:   Patient Vitals for the past 72 hrs:   % Diet Eaten   07/04/18 0800 75 %       Will continue to follow as needed.     Thank you  Panchito Hernandez RD, CDE

## 2018-07-06 NOTE — DISCHARGE INSTRUCTIONS
Discharge Instructions       PATIENT ID: Jeffry Pleitez  MRN: [id number]   YOB: 1935    DATE OF ADMISSION: 7/2/2018  9:24 AM    DATE OF DISCHARGE: 7/6/2018    PRIMARY CARE PROVIDER: Verner Grooms, MD     ATTENDING PHYSICIAN: Kathie Perkins MD  DISCHARGING PROVIDER: Kathie Perkins MD    To contact this individual call [phone] and ask the  to page. If unavailable ask to be transferred the Adult Hospitalist Department. DISCHARGE DIAGNOSES Heart Failure Exacerbation    CONSULTATIONS: None    PROCEDURES/SURGERIES: * No surgery found *    PENDING TEST RESULTS:   At the time of discharge the following test results are still pending: None    FOLLOW UP APPOINTMENTS:   Follow-up Information     Follow up With Details Comments Contact Info    Verner Grooms, MD Schedule an appointment as soon as possible for a visit  [address] [place] [address] Str.  [phone]             ADDITIONAL CARE RECOMMENDATIONS:     You came in with shortness of breath from fluid build up. PLEASE take a higher dose of the fluid pill or Lasix at 80 mg. You are now feeling better and it is safe for you to go to a rehab facility to work on your strength and continue to improve. Please follow-up with your PCP and Oncologist.    DIET: Cardiac Diet and Diabetic Diet    ACTIVITY: Activity as tolerated    DISCHARGE MEDICATIONS:   See Medication Reconciliation Form    · It is important that you take the medication exactly as they are prescribed. · Keep your medication in the bottles provided by the pharmacist and keep a list of the medication names, dosages, and times to be taken in your wallet. · Do not take other medications without consulting your doctor. NOTIFY YOUR PHYSICIAN FOR ANY OF THE FOLLOWING:   Fever over 101 degrees for 24 hours. Chest pain, shortness of breath, fever, chills, nausea, vomiting, diarrhea, change in mentation, falling, weakness, bleeding.  Severe pain or pain not relieved by medications. Or, any other signs or symptoms that you may have questions about. DISPOSITION:    Home With:   OT  PT  HH  RN       SNF/Inpatient Rehab/LTAC    Independent/assisted living    Hospice    Other:       Signed:   David Reagan MD  7/6/2018  3:06 PM   Pulmonary Edema: Care Instructions  Your Care Instructions    Pulmonary edema is the buildup of fluid in the lungs. It usually occurs when the heart does not pump blood through the body properly. Pulmonary edema can also be caused by another disease, such as liver or kidney failure. It can also happen at high altitudes, from a poisoning, or as a result of a near-drowning. If you have fluid in your lungs, you may have trouble breathing, be restless, have a fast heart rate, or cough up foamy pink fluid. Breathing problems may be worse when you lie down. Follow-up care is a key part of your treatment and safety. Be sure to make and go to all appointments, and call your doctor if you are having problems. It's also a good idea to know your test results and keep a list of the medicines you take. How can you care for yourself at home? Medicines  ? · Take your medicines exactly as prescribed. Call your doctor if you think you are having a problem with your medicine. ? · Review all of your regular medicines with your doctor. Do not take any vitamins, over-the-counter medicines, or herbal products without talking to your doctor first.   Diet  ? · Eat a balanced diet. Make an appointment with a dietitian if you have questions about what type of diet might be best for you. ? · Do not eat more than 2,000 milligrams (mg) of sodium each day. That is less than 1 teaspoon of salt a day, including all the salt you eat in prepared or packaged foods. ¨ Do not add salt while you are cooking or at the table. Flavor with garlic, lemon juice, onion, vinegar, herbs, and spices instead of salt.   ¨ Eat fewer processed foods and foods from restaurants, including fast food.  ¨ Use fresh or frozen foods instead of canned. ¨ Count and record how much sodium you eat each day. Check food labels for sodium. ¨ Ask your doctor before using salt substitutes that have potassium, such as Lite Salt. ? Lifestyle  ? · Stay out of air pollution; smog; cold, dry air; hot, humid air; and high altitudes. ? · Learn breathing methods that help the airflow in and out of your lungs. ? · Take rest breaks often. Schedule short rest breaks when doing housework and other activities. An occupational or physical therapist can help you find ways to do everyday activities with less effort. ? · Start light exercise if your doctor says it is okay. Try to stay as active as possible. If you have not exercised in the past, start out slowly. Walking is a good way to start. ? · Get enough rest at night. Sleeping with 1 or 2 pillows under your upper body and head may help you breathe easier at night. ? · Discuss rehabilitation with your doctor. Find out what programs are available in your area. ? · Do not smoke or use other tobacco products. Smoking can make your condition worse. If you need help quitting, talk to your doctor about stop-smoking programs and medicines. These can increase your chances of quitting for good. ? · Do not use alcohol or illegal drugs. When should you call for help? Call 911 anytime you think you may need emergency care. For example, call if:  ? · You have severe trouble breathing. ? · You passed out (lost consciousness). ? · You have symptoms of a heart attack. These may include:  ¨ Chest pain or pressure, or a strange feeling in the chest.  ¨ Sweating. ¨ Shortness of breath. ¨ Nausea or vomiting. ¨ Pain, pressure, or a strange feeling in the back, neck, jaw, or upper belly or in one or both shoulders or arms. ¨ Lightheadedness or sudden weakness. ¨ A fast or irregular heartbeat.  Pain that spreads from the chest to the neck, jaw, or one or both shoulders or arms.   ?After you call 911, the  may tell you to chew 1 adult-strength or 2 to 4 low-dose aspirin. Wait for an ambulance. Do not try to drive yourself. ?Call your doctor now or seek immediate medical care if:  ? · You have trouble breathing or have wheezing that is getting worse. ? · You are coughing more deeply or more often. ? · You cough up blood. ? · You get a fever. ? · You have more swelling in your legs or belly. ? · Your symptoms are getting worse. ? Watch closely for changes in your health, and be sure to contact your doctor if you have any problems. Where can you learn more? Go to http://david-noni.info/. Enter I282 in the search box to learn more about \"Pulmonary Edema: Care Instructions. \"  Current as of: May 12, 2017  Content Version: 11.4  © 1473-1833 [employer]. Care instructions adapted under license by [employer] (which disclaims liability or warranty for this information). If you have questions about a medical condition or this instruction, always ask your healthcare professional. [place] [address] any warranty or liability for your use of this information.

## 2018-07-07 LAB
BACTERIA SPEC CULT: NORMAL
SERVICE CMNT-IMP: NORMAL

## 2018-07-09 ENCOUNTER — PATIENT OUTREACH (OUTPATIENT)
Dept: CARDIOLOGY CLINIC | Age: 83
End: 2018-07-09

## 2018-07-09 NOTE — PROGRESS NOTES
Nurse navigator note - cardiology  Incoming call from  / Humberto Hanks is full code at the facility - pt had amended CPR orders at [facility]. Discussed daily weights - requested for patient. CHF. Pt is under oversight of Dr. Watson Arora and NP Bertha Harris at [facility]. Requested notification of discharge - from SNF -    Discussed with SW concerns re: high RRAT - 58 - and frequent ER and hospital use -   [facility]  - SW - thinks pt has had a sleep study. Check on bipap with nursing                  Daily weights       Code status - full code since leaving hospital.       Notification of discharge from SNF - requested of .     Jorge Luis Owen RN , [address], [facility]   [address]  [phone]

## 2018-07-09 NOTE — PROGRESS NOTES
Hospital Discharge Follow-Up      Date/Time:  7/9/2018 9:45 AM    Patient was admitted to [facility] on July 2 and discharged on July 6, 2018  for dyspnea, CHF exacerbation. . The physician discharge summary was available at the time of outreach. Patient was contacted within 1 business days of discharge.  notes reviewed - and discharge notes - for pt with 4 admissions in last 6 months - palliative care referral declined by pt's son. Pt's son requests change of venue from [place] at discharge -      Assessment and Plan   1. Acute on chronic systolic CHF:  EF 33%, 1/2/54. NYHA IV on admission. NYHA III now.  -Continue Coreg 6.25 mg BID  -Holding on ace-I due to CKD/elevated creatinine  -Pulmonary  exam with Crackles bilaterally noted. - will give another dose lasix 40 mg IV x 1 today and hold po lasix this a.m. Check PA/Lateral CXR  -Stop Isorbide and hydralazine as bp now low-low NL. BP will not tolerate  -Daily weights and I/Os- requested Standing scale weights.  -continue bipap at HS      2. Hx of CAD/severe multivessal disease  -Not candidate for CABG or PCI   -Continue ASA, plavix  -Not on statin due to intolerance  -Continue Repatha- due 7/12/18  -trivial troponin elevation (0.23) this admission likely from demand ischemia- not NSTEMI. No need for further troponins.  -Partial code- no compressions no intubation      3. Hx of CKD: AM labs pending.      5. Hx of HTN;  bp low-low NL; -Coreg as above; stop hydralazine/isordil     6. Hx of metastatic breast cancer:  Has seen palliative care in past.      7. Hx of DM type II     8. Anemia, chronic disease: stable 10.5      9. Advanced directives: Palliative care has seen pt in past admission.  Currently partial code- no compressions, no intubation.       Top Challenges reviewed with the provider   Multi comorbids - CHF/ respiratory failure; pulmonary edema  Metastatic breast cancer  Debility, fail to thrive  Multiple medications stopped at d/c - verify with SNF - and re: cardiology orders above. Hypotension limiting CHF regimen  Daily standing weights       Method of communication with provider :chart routing, staff message    Inpatient RRAT score: 58  Was this a readmission? yes   Patient stated reason for the readmission: dyspnea    Nurse Navigator (NN) contacted the caregiver by telephone to perform post hospital discharge assessment. Verified name and  with caregiver as identifiers. Provided introduction to self, and explanation of the Nurse Navigator role. Reviewed discharge instructions and red flags with caregiver who verbalized understanding. Caregiver given an opportunity to ask questions and does not have any further questions or concerns at this time. The caregiver agrees to contact the PCP office for questions related to their healthcare. NN provided contact information for future reference. Disease Specific:   CHF    Summary of patient's top problems:  1. Pulmonary edema/ CHF exacerbation  2. Dyspnea d/t condition  3. Frailty, fail to thrive    Home Health orders at discharge:d/c [address]: n/a  Date of initial visit: n/a    Durable Medical Equipment ordered/company: bi-pap  Durable Medical Equipment received:     Barriers to care? depression, ineffective coping, lack of knowledge about disease, stages of grief, support system, transportation, utilization of services    Advance Care Planning:   Does patient have an Advance Directive:  Pt had an inpatient order for no compressions, no artificial ventilation - no dDNR noted/ no Advanced Directive noted -will discuss with  at the [address] [facility].     Medication(s):     New Medications at Discharge: Lasix 80 mg qam   Changed Medications at Discharge: none  Discontinued Medications at Discharge: Epoetin injections, Enbrel injections, Repatha, Forteo, Glimepiried 1 mg, Ibrance     Medication reconciliation was performed with caregiver, Daniel Ascencio [facility]. who verbalizes understanding of administration of home medications. There were no barriers to obtaining medications identified at this time. Referral to Pharm D needed: no     PCP/Specialist follow up: No future appointments. Community Provider Appointment:   [facility]:  D/c to SNF with physician oversight. Goals      Attends follow-up appointments as directed. 6/7/18  Pt to see Dr. Carolina Elmore on 6/15 12:30   appt verified- [address] [place] [address] informed    Cardiology appt for week of 6/11-15- appt made for 6/13 1:40 Dr. Matthew Quiñonez  Nn to call HS on 6/7 - for nurse Steve -     Pt under care of Dr. Diana Ellis - at [address] [place] [address]. Misty Lowe RN , CHFN, CCM  NN KATHLEEN [place]  [phone]       Knowledge and adherence of prescribed medication (ie. action, side effects, missed dose, etc.).            5/15/18  Complex medication list - with multiple new meds/ and stopping several meds -   Request to HS - for med list - to compare and review any changes. 6/7/18 -   Pt D/C with NTG 0.4 mg prn - stop Aldactone  Monitor K and phosphorus - fax to [address] [place] [address] by Dr. Amrita Stovall at [facility]. Misty Lowe RN , CHFN, [facility]  NN KATHLEEN [place]  [phone]       Supportive resources in place to maintain patient in the community (ie. Home Health, DME equipment, refer to, medication assistant plan, etc.)            5/15/18  Pt discharged to [address] [place] [address] - rehab - on 5/11 - for strengthening/ debility  Pt has follow up appt with cardiology 5/25,   Oncology in 3 weeks - Rheumatology - 3 weeks - RA infusions. ttk    6/7/18   Discharge to  - rehab on 6/7 - after readmit for chest pain / HFrEF/   D/C to [address] [place] [address] - with daily am weights/ active rehab and condition mgmt  ttk       Supportive resources in place to maintain patient in the community (ie.  Home Health, DME equipment, refer to, medication assistant plan, etc.) 6/11/18   Pt is currently at [facility] having therapy -   Evaluate if pt needs home health and what action plan is for discharge -  ttk       Understands red flags post discharge. 6/11/18  Daily am weights for CHF management  If using NTG with some frequency - notify cardiology -   For evaluation long acting nitrate  MD on call 24/7 at [phone] - for dyspnea, swelling, weight gain, chest pain. ttk           CM received notice that insurance has given authorization for [facility] [employer]. [facility] has a bed and can accept pt today. Son is in agreement with plan and will transport pt around 4:30pm.  CM faxed AVS and Bi-Pap settings to [employer]. Envelope containing MARs, Kardex, AVS, and Bi-Pap settings will be sent with pt. Nurse will call report.   Noemi Tavarez, [address], [address],[address]  Address: [address] [place] [address], [address] [place] [address]  Hours: Open now ·   Phone: [phone]

## 2018-07-12 ENCOUNTER — TELEPHONE (OUTPATIENT)
Dept: CARDIOLOGY CLINIC | Age: 83
End: 2018-07-12

## 2018-07-12 NOTE — TELEPHONE ENCOUNTER
fernando (Director of nursing) [place] would like to speak with someone they need to know if the patient is still on isosorbide   Phone: [phone]

## 2018-07-12 NOTE — TELEPHONE ENCOUNTER
Isosorbide was stopped due to low BP (Dr. Mitchell Nail note from 7/2/18). Called Paulina. Left message on \"non-safe\" voicemail stating I was returning call from [facility]. Will let her know about medication. Talia Doyle called back. Notified her of the above. She denied further questions or concerns.

## 2018-07-14 ENCOUNTER — APPOINTMENT (OUTPATIENT)
Dept: GENERAL RADIOLOGY | Age: 83
End: 2018-07-14
Attending: EMERGENCY MEDICINE
Payer: MEDICARE

## 2018-07-14 ENCOUNTER — HOSPITAL ENCOUNTER (EMERGENCY)
Age: 83
Discharge: HOME | End: 2018-07-14
Attending: EMERGENCY MEDICINE
Payer: MEDICARE

## 2018-07-14 VITALS
OXYGEN SATURATION: 97 % | WEIGHT: 125 LBS | HEART RATE: 74 BPM | TEMPERATURE: 98.6 F | RESPIRATION RATE: 20 BRPM | BODY MASS INDEX: 20.83 KG/M2 | SYSTOLIC BLOOD PRESSURE: 112 MMHG | HEIGHT: 65 IN | DIASTOLIC BLOOD PRESSURE: 60 MMHG

## 2018-07-14 DIAGNOSIS — R06.00 DYSPNEA, UNSPECIFIED TYPE: Primary | ICD-10-CM

## 2018-07-14 LAB
ALBUMIN SERPL-MCNC: 2.8 G/DL (ref 3.5–5)
ALBUMIN/GLOB SERPL: 0.6 {RATIO} (ref 1.1–2.2)
ALP SERPL-CCNC: 522 U/L (ref 45–117)
ALT SERPL-CCNC: 21 U/L (ref 12–78)
ANION GAP SERPL CALC-SCNC: 10 MMOL/L (ref 5–15)
AST SERPL-CCNC: 21 U/L (ref 15–37)
BASOPHILS # BLD: 0 K/UL (ref 0–0.1)
BASOPHILS NFR BLD: 1 % (ref 0–1)
BILIRUB SERPL-MCNC: 0.5 MG/DL (ref 0.2–1)
BNP SERPL-MCNC: 3665 PG/ML (ref 0–450)
BUN SERPL-MCNC: 43 MG/DL (ref 6–20)
BUN/CREAT SERPL: 30 (ref 12–20)
CALCIUM SERPL-MCNC: 8.6 MG/DL (ref 8.5–10.1)
CHLORIDE SERPL-SCNC: 98 MMOL/L (ref 97–108)
CO2 SERPL-SCNC: 27 MMOL/L (ref 21–32)
CREAT SERPL-MCNC: 1.43 MG/DL (ref 0.55–1.02)
DIFFERENTIAL METHOD BLD: ABNORMAL
EOSINOPHIL # BLD: 0 K/UL (ref 0–0.4)
EOSINOPHIL NFR BLD: 1 % (ref 0–7)
ERYTHROCYTE [DISTWIDTH] IN BLOOD BY AUTOMATED COUNT: 21.9 % (ref 11.5–14.5)
GLOBULIN SER CALC-MCNC: 4.6 G/DL (ref 2–4)
GLUCOSE SERPL-MCNC: 213 MG/DL (ref 65–100)
HCT VFR BLD AUTO: 34.6 % (ref 35–47)
HGB BLD-MCNC: 10.8 G/DL (ref 11.5–16)
IMM GRANULOCYTES # BLD: 0 K/UL
IMM GRANULOCYTES NFR BLD AUTO: 0 %
LYMPHOCYTES # BLD: 1.3 K/UL (ref 0.8–3.5)
LYMPHOCYTES NFR BLD: 39 % (ref 12–49)
MCH RBC QN AUTO: 27.8 PG (ref 26–34)
MCHC RBC AUTO-ENTMCNC: 31.2 G/DL (ref 30–36.5)
MCV RBC AUTO: 89.2 FL (ref 80–99)
MONOCYTES # BLD: 0.8 K/UL (ref 0–1)
MONOCYTES NFR BLD: 24 % (ref 5–13)
NEUTS SEG # BLD: 1.2 K/UL (ref 1.8–8)
NEUTS SEG NFR BLD: 35 % (ref 32–75)
NRBC # BLD: 0 K/UL (ref 0–0.01)
NRBC BLD-RTO: 0 PER 100 WBC
PATH REV BLD -IMP: ABNORMAL
PLATELET # BLD AUTO: 254 K/UL (ref 150–400)
PMV BLD AUTO: 9.8 FL (ref 8.9–12.9)
POTASSIUM SERPL-SCNC: 3.7 MMOL/L (ref 3.5–5.1)
PROT SERPL-MCNC: 7.4 G/DL (ref 6.4–8.2)
RBC # BLD AUTO: 3.88 M/UL (ref 3.8–5.2)
RBC MORPH BLD: ABNORMAL
SODIUM SERPL-SCNC: 135 MMOL/L (ref 136–145)
TROPONIN I SERPL-MCNC: <0.05 NG/ML
WBC # BLD AUTO: 3.3 K/UL (ref 3.6–11)
WBC MORPH BLD: ABNORMAL

## 2018-07-14 PROCEDURE — 83880 ASSAY OF NATRIURETIC PEPTIDE: CPT | Performed by: EMERGENCY MEDICINE

## 2018-07-14 PROCEDURE — 84484 ASSAY OF TROPONIN QUANT: CPT | Performed by: EMERGENCY MEDICINE

## 2018-07-14 PROCEDURE — 80053 COMPREHEN METABOLIC PANEL: CPT | Performed by: EMERGENCY MEDICINE

## 2018-07-14 PROCEDURE — 36415 COLL VENOUS BLD VENIPUNCTURE: CPT | Performed by: EMERGENCY MEDICINE

## 2018-07-14 PROCEDURE — 99284 EMERGENCY DEPT VISIT MOD MDM: CPT

## 2018-07-14 PROCEDURE — 85025 COMPLETE CBC W/AUTO DIFF WBC: CPT | Performed by: EMERGENCY MEDICINE

## 2018-07-14 PROCEDURE — 93005 ELECTROCARDIOGRAM TRACING: CPT

## 2018-07-14 PROCEDURE — 71046 X-RAY EXAM CHEST 2 VIEWS: CPT

## 2018-07-14 NOTE — ED PROVIDER NOTES
HPI Comments: 82 y.o. female with past medical history significant for flash pulmonary edema, MI, CVA, stage 4 metastatic breast CA (dx 04/2018), CHF, CVD, DM and HTN who presents via EMS with chief complaint of SOB. Pt reports at 1600, ~ 2 hours ago, she had sudden onset of SOB while she was lying down. Pt explains SOB has persisted and remained constant. Son adds that pt had her hair cut and colored today prior to onset of sx. Son states that pt has had 4 flash pulmonary edemas in the last 3 months and her current sx \"are almost nonexistent compared to her SOB at thoses times\". Son also adds pt has had 2 MIs in the last 3 months. During this time she has been admitted to the hospital for ~7 weeks, with 4 of those in the ICU. Between these admission pt has been in 3 different rehabs and is currently residing at [facility]. Per son, currently pt uses BiPAP at night and occasionally during the day. Son explains previously when pt's lasix was reduced she had a flash pulmonary edema. Pt denies SOB is accompanied by other sx, including CP, leg edema, leg pain and nausea. There are no other acute medical concerns at this time. PCP: Jeanne Olguin MD    Note written by Lauren Perrin, as dictated by Young Albert MD 6:08 PM      The history is provided by the patient and a relative (son).         Past Medical History:   Diagnosis Date    Anemia 9/2/2009    Colon cancer ([facility] [address] 9/2/2009    surgery/chemo    Colon cancer ([facility] [address] 9/2/2009    DM (diabetes mellitus) ([facility] [address] 9/2/2009    GERD (gastroesophageal reflux disease)     H/O: CVA 9/2/2009    slight l sided weakness    Hypothyroid 9/2/2009    Ill-defined condition     seasonal allergies    Microalbuminuria 9/2/2009    Osteoporosis 9/2/2009    Other and unspecified hyperlipidemia 1/27/2010    Rheumatoid arthritis involving ankle ([address] 9/28/2016    Rheumatoid arthritis(714.0) 9/2/2009    Unspecified essential hypertension 9/2/2009    Weakness due to cerebrovascular accident        Past Surgical History:   Procedure Laterality Date    HX COLECTOMY      HX HYSTERECTOMY      HX OTHER SURGICAL      colonoscopies numerous since          Family History:   Problem Relation Age of Onset    Diabetes Mother     Stroke Mother     Diabetes Sister     Other Sister      fell and hit her head -  of this   [place] Diabetes Brother     Cancer Father      stomach    Diabetes Sister        Social History     Social History    Marital status:      Spouse name: N/A    Number of children: N/A    Years of education: N/A     Occupational History    Not on file. Social History Main Topics    Smoking status: Never Smoker    Smokeless tobacco: Never Used    Alcohol use No    Drug use: No    Sexual activity: Not Currently     Partners: Male     Other Topics Concern    Not on file     Social History Narrative         ALLERGIES: Statins-hmg-coa reductase inhibitors and Sulfa (sulfonamide antibiotics)    Review of Systems   Constitutional: Negative for activity change, chills and fever. HENT: Negative for nosebleeds, sore throat, trouble swallowing and voice change. Eyes: Negative for visual disturbance. Respiratory: Positive for shortness of breath. Cardiovascular: Negative for chest pain, palpitations and leg swelling. Gastrointestinal: Negative for abdominal pain, constipation, diarrhea and nausea. Genitourinary: Negative for difficulty urinating, dysuria, hematuria and urgency. Musculoskeletal: Negative for back pain, neck pain and neck stiffness. Skin: Negative for color change. Allergic/Immunologic: Negative for immunocompromised state. Neurological: Negative for dizziness, seizures, syncope, weakness, light-headedness, numbness and headaches. Psychiatric/Behavioral: Negative for behavioral problems, confusion, hallucinations, self-injury and suicidal ideas. All other systems reviewed and are negative.       Vitals:    18 1812   BP: 104/50   Pulse: 78   Resp: 25   Temp: 98.4 °F (36.9 °C)   SpO2: 98%   Weight: 56.7 kg (125 lb)   Height: 5' 5\" (1.651 m)            Physical Exam   Constitutional: She is oriented to person, place, and time. She appears well-developed and well-nourished. No distress. HENT:   Head: Normocephalic and atraumatic. Eyes: Pupils are equal, round, and reactive to light. Neck: Normal range of motion. Neck supple. Cardiovascular: Normal rate, regular rhythm and normal heart sounds. Exam reveals no gallop and no friction rub. No murmur heard. Pulmonary/Chest: Effort normal. No respiratory distress. She has no wheezes. Mild crackles BL bases   Abdominal: Soft. Bowel sounds are normal. She exhibits no distension. There is no tenderness. There is no rebound and no guarding. Musculoskeletal: Normal range of motion. Neurological: She is alert and oriented to person, place, and time. Skin: Skin is warm. No rash noted. She is not diaphoretic. Psychiatric: She has a normal mood and affect. Her behavior is normal. Judgment and thought content normal.   Nursing note and vitals reviewed. Note written by Lauren Gross, as dictated by Mariam Barrios MD 6:08 PM    [facility]      ED Course     This is an 72-year-old female with past medical history, review of systems, physical exam as above, presenting with complaints of dyspnea. Patient states acute onset dyspnea occurred today, while receiving her treatment, after exposure to hair spray. The patient history complicated by recent episodes of flash pulmonary edema, respiratory distress, with ongoing pulmonary edema pleural effusions. On arrival patient awake, alert, satting well on room air, without signs of respiratory distress. She has mild crackles in the bilateral bases, clear otherwise, soft nontender abdomen, regular rate and rhythm without murmurs gallops or rubs. Presentation is concerning for pending respiratory distress.  Plan to obtain CMP, CBC, chest x-ray, cardiac enzymes and BNP. We'll make a disposition based the patient's diagnostics and response to therapy. 7:58 PM    Patient with chest x-ray improving, BNP far below recent values, as she remains resting comfortably, satting well on room air, states the dyspnea has resolved. Possible symptoms secondary to chemical exposure earlier today. I discussed the results with the patient and her family extensively at bedside, and they feel comfortable going home following up with her cardiologist.    Procedures    ED EKG interpretation:  Rhythm: normal sinus rhythm; and regular .  Rate (approx.): 80; ST/T wave: T wave inversion V3-V6; No ST elevations    Note written by Lauren Florez, as dictated by Rusty Plunkett MD 6:10 PM

## 2018-07-14 NOTE — ED NOTES
6:17 Juan Carlos Cisneros MD at bedside to evaluate patient. Patient placed on CM x 3. Call bell in reach family at bedside.

## 2018-07-14 NOTE — ED TRIAGE NOTES
Triage Note: Patient arriving via EMS for c/o SOB after getting her hair done about 2 hours ago. Reports that she is continued with SOB. Hx of CHF.  Denies CP, N/V.

## 2018-07-14 NOTE — DISCHARGE INSTRUCTIONS

## 2018-07-15 LAB
ATRIAL RATE: 80 BPM
CALCULATED P AXIS, ECG09: 53 DEGREES
CALCULATED R AXIS, ECG10: 34 DEGREES
CALCULATED T AXIS, ECG11: 143 DEGREES
DIAGNOSIS, 93000: NORMAL
P-R INTERVAL, ECG05: 160 MS
Q-T INTERVAL, ECG07: 404 MS
QRS DURATION, ECG06: 84 MS
QTC CALCULATION (BEZET), ECG08: 465 MS
VENTRICULAR RATE, ECG03: 80 BPM

## 2018-07-25 ENCOUNTER — PATIENT OUTREACH (OUTPATIENT)
Dept: CARDIOLOGY CLINIC | Age: 83
End: 2018-07-25

## 2018-07-25 NOTE — PROGRESS NOTES
Outgoing call made to [facility] worker at [facility] [employer]. Left message to return call. Outgoing call made to nursing staff. Nurse not available at this time. Will attempt to call back for update on patient. Continue goals. Goals      Attends follow-up appointments as directed. 6/7/18  Pt to see Dr. Ayse Sanchez on 6/15 12:30   appt verified- [address] [place] [address] informed    Cardiology appt for week of 6/11-15- appt made for 6/13 1:40 Dr. Kena Bradford  Nn to call HS on 6/7 - for nurse Steve -     Pt under care of Dr. Camila Blair - at [address] [place] [address]. Mireya Byrne RN , CHFN, CCM  NN CAV Erika Goff  [phone]       Knowledge and adherence of prescribed medication (ie. action, side effects, missed dose, etc.).            5/15/18  Complex medication list - with multiple new meds/ and stopping several meds -   Request to HS - for med list - to compare and review any changes. 6/7/18 -   Pt D/C with NTG 0.4 mg prn - stop Aldactone  Monitor K and phosphorus - fax to [address] [place] [address] by Dr. Mana Fish at [facility]. Mireya Byrne RN , CHFN, [facility]  NN CAV Erika Goff  [phone]       Supportive resources in place to maintain patient in the community (ie. Home Health, DME equipment, refer to, medication assistant plan, etc.)            5/15/18  Pt discharged to [address] [place] [address] - rehab - on 5/11 - for strengthening/ debility  Pt has follow up appt with cardiology 5/25,   Oncology in 3 weeks - Rheumatology - 3 weeks - RA infusions. ttk    6/7/18   Discharge to  - rehab on 6/7 - after readmit for chest pain / HFrEF/   D/C to [address] [place] [address] - with daily am weights/ active rehab and condition mgmt  ttk       Supportive resources in place to maintain patient in the community (ie.  Home Health, DME equipment, refer to, medication assistant plan, etc.)            6/11/18   Pt is currently at [address] [place] [address] having therapy -   Evaluate if pt needs home health and what action plan is for discharge -  ttk       Understands red flags post discharge. 6/11/18  Daily am weights for CHF management  If using NTG with some frequency - notify cardiology -   For evaluation long acting nitrate  MD on call 24/7 at [phone] - for dyspnea, swelling, weight gain, chest pain.   ttk

## 2018-07-30 ENCOUNTER — PATIENT OUTREACH (OUTPATIENT)
Dept: CARDIOLOGY CLINIC | Age: 83
End: 2018-07-30

## 2018-07-30 NOTE — PROGRESS NOTES
Raulito for , [address] [facility], [address] call for updates on pt condition.  VBD patient with readmission -   Pt has been at [facility] - then SNF - now d/c to [address] [place] [address], RN , [facility], [address] [place] [address] [address] [facility]  [phone]

## 2018-08-03 ENCOUNTER — PATIENT OUTREACH (OUTPATIENT)
Dept: CARDIOLOGY CLINIC | Age: 83
End: 2018-08-03

## 2018-08-03 NOTE — PROGRESS NOTES
Left message for [facility],  at the [employer] at [facility] [employer]. Informed by  [facility] is out of office today. Kirtsin Benavidez is covering but is not available at this time.

## 2018-08-09 ENCOUNTER — PATIENT OUTREACH (OUTPATIENT)
Dept: CARDIOLOGY CLINIC | Age: 83
End: 2018-08-09

## 2018-08-20 ENCOUNTER — OFFICE VISIT (OUTPATIENT)
Dept: CARDIOLOGY CLINIC | Age: 83
End: 2018-08-20

## 2018-08-20 VITALS
WEIGHT: 120.2 LBS | HEART RATE: 72 BPM | RESPIRATION RATE: 16 BRPM | BODY MASS INDEX: 20.03 KG/M2 | DIASTOLIC BLOOD PRESSURE: 50 MMHG | SYSTOLIC BLOOD PRESSURE: 118 MMHG | HEIGHT: 65 IN

## 2018-08-20 DIAGNOSIS — Z78.9 STATIN INTOLERANCE: ICD-10-CM

## 2018-08-20 DIAGNOSIS — I10 ESSENTIAL HYPERTENSION: ICD-10-CM

## 2018-08-20 DIAGNOSIS — N18.30 CKD (CHRONIC KIDNEY DISEASE) STAGE 3, GFR 30-59 ML/MIN (HCC): ICD-10-CM

## 2018-08-20 DIAGNOSIS — I25.5 ISCHEMIC CARDIOMYOPATHY: ICD-10-CM

## 2018-08-20 DIAGNOSIS — I25.10 CORONARY ARTERY DISEASE INVOLVING NATIVE CORONARY ARTERY OF NATIVE HEART WITHOUT ANGINA PECTORIS: Primary | ICD-10-CM

## 2018-08-20 DIAGNOSIS — E78.2 MIXED HYPERLIPIDEMIA: ICD-10-CM

## 2018-08-20 DIAGNOSIS — C50.919 METASTATIC BREAST CANCER (HCC): ICD-10-CM

## 2018-08-20 PROBLEM — E11.21 TYPE 2 DIABETES WITH NEPHROPATHY (HCC): Status: ACTIVE | Noted: 2018-08-20

## 2018-08-20 RX ORDER — ADHESIVE BANDAGE
30 BANDAGE TOPICAL DAILY PRN
COMMUNITY
End: 2019-07-02 | Stop reason: ALTCHOICE

## 2018-08-20 RX ORDER — DOCUSATE SODIUM 100 MG/1
100 CAPSULE, LIQUID FILLED ORAL 2 TIMES DAILY
COMMUNITY
End: 2018-10-19 | Stop reason: ALTCHOICE

## 2018-08-20 RX ORDER — INSULIN LISPRO 100 [IU]/ML
INJECTION, SOLUTION INTRAVENOUS; SUBCUTANEOUS
COMMUNITY
End: 2019-02-18

## 2018-08-20 NOTE — MR AVS SNAPSHOT
[address] [facility] [address] [place] 
[phone] Patient: Anahy Noel MRN:  [id number] Visit Information Date & Time Provider Department Dept. Phone Encounter #  
 8/20/2018 11:20 AM Shaquille Newsome MD [facility] Chris Rodriguez [phone] [id number] Follow-up Instructions Return in about 3 months (around 11/20/2018). Your Appointments 10/22/2018 11:40 AM  
ESTABLISHED PATIENT with Shaquille Newsome MD  
[facility] ([facility]) Appt Note: 2 mo f/u per Dr. Butch Williamson [address]  [address] [place] [address]  
[address] [address] Upcoming Health Maintenance Date Due  
 FOOT EXAM Q1 2/21/1945 EYE EXAM RETINAL OR DILATED Q1 2/21/1945 DTaP/Tdap/Td series (1 - Tdap) 2/21/1958 ZOSTER VACCINE AGE 60> 12/21/1996 GLAUCOMA SCREENING Q2Y 2/21/2002 Pneumococcal 65+ High/Highest Risk (2 of 2 - PPSV23) 6/29/2011 BREAST CANCER SCRN MAMMOGRAM 5/19/2012 COLONOSCOPY 4/20/2016 MICROALBUMIN Q1 3/17/2017 MEDICARE YEARLY EXAM 3/28/2018 Influenza Age 5 to Adult 8/1/2018 HEMOGLOBIN A1C Q6M 1/2/2019 LIPID PANEL Q1 4/16/2019 Allergies as of 8/20/2018  Review Complete On: 8/20/2018 By: Shaquille Newsome MD  
  
 Severity Noted Reaction Type Reactions Statins-hmg-coa Reductase Inhibitors  12/21/2016    Other (comments) Intolerant to statins Sulfa (Sulfonamide Antibiotics)  09/02/2009    Other (comments)  
 syncope Current Immunizations  Reviewed on 4/22/2018 Name Date Influenza High Dose Vaccine PF 11/1/2016 Influenza Vaccine Split 10/1/2011, 10/15/2010 Influenza Vaccine Whole 10/1/2004 ZZZ-RETIRED (DO NOT USE) Pneumococcal Vaccine (Unspecified Type) 6/29/2006 Not reviewed this visit You Were Diagnosed With   
  
 Codes Comments Coronary artery disease involving native coronary artery of native heart without angina pectoris    -  Primary ICD-10-CM: I25.10 ICD-9-CM: 414.01 Mixed hyperlipidemia     ICD-10-CM: E78.2 ICD-9-CM: 272.2 Essential hypertension     ICD-10-CM: I10 
ICD-9-CM: 401.9 CKD (chronic kidney disease) stage 3, GFR 30-59 ml/min     ICD-10-CM: N18.3 ICD-9-CM: 949. 3 Metastatic breast cancer ([facility]     ICD-10-CM: R26.714 ICD-9-CM: 174.9 Statin intolerance     ICD-10-CM: Z78.9 ICD-9-CM: 995.27 Ischemic cardiomyopathy     ICD-10-CM: I25.5 ICD-9-CM: 414.8 Vitals BP Pulse Resp Height(growth percentile) Weight(growth percentile) BMI  
 118/50 (BP 1 Location: Right arm, BP Patient Position: Sitting) 72 16 5' 5\" (1.651 m) 120 lb 3.2 oz (54.5 kg) 20 kg/m2 OB Status Smoking Status Hysterectomy Never Smoker Vitals History BMI and BSA Data Body Mass Index Body Surface Area  
 20 kg/m 2 1.58 m 2 Preferred Pharmacy Pharmacy Name Phone [address] [facility], [address] [place] [address] [phone] Your Updated Medication List  
  
   
This list is accurate as of 8/20/18 12:17 PM.  Always use your most recent med list.  
  
  
  
  
 acetaminophen 325 mg tablet Commonly known as:  TYLENOL Take 2 Tabs by mouth every four (4) hours as needed. anastrozole 1 mg tablet Commonly known as:  ARIMIDEX Take 1 Tab by mouth daily. aspirin delayed-release 81 mg tablet Take 81 mg by mouth daily. b-complex with vitamin c tablet Take 1 Tab by mouth daily. carvedilol 6.25 mg tablet Commonly known as:  Watkins Servant Take 1 Tab by mouth two (2) times daily (with meals). clopidogrel 75 mg Tab Commonly known as:  PLAVIX Take 1 Tab by mouth daily. docusate sodium 100 mg capsule Commonly known as:  Vermell Nones Take 100 mg by mouth two (2) times a day. MAKAYLA-C PO Take 1,000 mg by mouth daily. folic acid 1 mg tablet Commonly known as:  FOLVITE  
TAKE ONE TABLET BY MOUTH DAILY FORTEO 20 mcg/dose - 600 mcg/2.4 mL Pnij injection Generic drug:  teriparatide 20 mcg by SubCUTAneous route daily. furosemide 40 mg tablet Commonly known as:  LASIX Take 2 Tabs by mouth daily. HumaLOG U-100 Insulin 100 unit/mL injection Generic drug:  insulin lispro  
by SubCUTAneous route. Sliding scale IBRANCE 125 mg Cap Generic drug:  palbociclib Take  by mouth daily. Indications: 21 days on medication, 7 days off medication  
  
 insulin glargine 100 unit/mL injection Commonly known as:  LANTUS  
10 Units by SubCUTAneous route daily. Indications: type 2 diabetes mellitus L. acidoph & paracasei- S therm- Bifido 8 billion cell Cap cap Commonly known as:  BERYL-Q/RISAQUAD Take 1 Cap by mouth daily. methyl salicylate-menthol 29-87 % topical cream  
Commonly known as:  Yuridia Cooper Apply  to affected area as needed for Pain. APPLY TO right shoulder  Nursing, document site in comments  
  
 nitroglycerin 0.4 mg SL tablet Commonly known as:  NITROSTAT  
1 Tab by SubLINGual route as needed for Chest Pain. Up to 3 doses. OMEGA 3 FISH OIL PO Take 300 mg by mouth daily. COORNA MILK OF MAGNESIA 400 mg/5 mL suspension Generic drug:  magnesium hydroxide Take 30 mL by mouth daily as needed for Constipation. polyethylene glycol 17 gram packet Commonly known as:  Nikunj Salt Take 1 Packet by mouth daily. REPATHA SURECLICK pen injection Generic drug:  evolocumab  
by SubCUTAneous route. simethicone 80 mg chewable tablet Commonly known as:  Lannette Libel Take 1 Tab by mouth as needed. Indications: FLATULENCE  
  
 SYNTHROID 88 mcg tablet Generic drug:  levothyroxine Take 88 mcg by mouth Daily (before breakfast). VITAMIN D3 PO Take 1,000 Units by mouth daily. XGEVA SC  
by SubCUTAneous route. ZOFRAN ODT 4 mg disintegrating tablet Generic drug:  ondansetron Take 4 mg by mouth every four (4) hours as needed for Nausea. Follow-up Instructions Return in about 3 months (around 11/20/2018). Introducing hospitals & [facility] SERVICES! Yeison Hall introduces [employer] patient portal. Now you can access parts of your medical record, email your doctor's office, and request medication refills online. 1. In your internet browser, go to [email or web address] 2. Click on the First Time User? Click Here link in the Sign In box. You will see the New Member Sign Up page. 3. Enter your [employer] Access Code exactly as it appears below. You will not need to use this code after youve completed the sign-up process. If you do not sign up before the expiration date, you must request a new code. · [employer] Access Code: [email or web address] Expires: 11/2/2018  5:18 AM 
 
4. Enter the last four digits of your Social Security Number (xxxx) and Date of Birth (mm/dd/yyyy) as indicated and click Submit. You will be taken to the next sign-up page. 5. Create a [employer] ID. This will be your [employer] login ID and cannot be changed, so think of one that is secure and easy to remember. 6. Create a [employer] password. You can change your password at any time. 7. Enter your Password Reset Question and Answer. This can be used at a later time if you forget your password. 8. Enter your e-mail address. You will receive e-mail notification when new information is available in [address] 9. Click Sign Up. You can now view and download portions of your medical record. 10. Click the Download Summary menu link to download a portable copy of your medical information. If you have questions, please visit the Frequently Asked Questions section of the [employer] website. Remember, [employer] is NOT to be used for urgent needs. For medical emergencies, dial 911. Now available from your iPhone and Android! Please provide this summary of care documentation to your next provider. Your primary care clinician is listed as Verner Grooms. If you have any questions after today's visit, please call [phone].

## 2018-08-20 NOTE — PROGRESS NOTES
HISTORY OF PRESENT ILLNESS  Gracy Razo is a 82 y.o. female     SUMMARY:   Problem List  Date Reviewed: 8/20/2018          Codes Class Noted    Type 2 diabetes with nephropathy ([facility] ICD-10-CM: E11.21  ICD-9-CM: 250.40, 583.81  8/20/2018        Acute respiratory failure ([facility] ICD-10-CM: J96.00  ICD-9-CM: 518.81  6/21/2018        Elevated troponin ICD-10-CM: R74.8  ICD-9-CM: 790.6  6/21/2018        CAD (coronary artery disease) ICD-10-CM: I25.10  ICD-9-CM: 414.00  6/21/2018        Acute renal failure superimposed on stage 3 chronic kidney disease ([facility] ICD-10-CM: N17.9, N18.3  ICD-9-CM: 584.9, 585.3  6/21/2018        Gout flare ICD-10-CM: M10.9  ICD-9-CM: 274.01  6/21/2018        Hyperglycemia due to type 2 diabetes mellitus ([place] [address].) ICD-10-CM: E11.65  ICD-9-CM: 250.00  6/21/2018        Metastatic breast cancer ([place] [address].) ICD-10-CM: C50.919  ICD-9-CM: 174.9  6/1/2018        Goals of care, counseling/discussion ICD-10-CM: Z71.89  ICD-9-CM: V65.49  6/1/2018        Acute on chronic systolic HF (heart failure) ([place] [address].) ICD-10-CM: I50.23  ICD-9-CM: 428.23  5/19/2018        Acute systolic heart failure ([facility] ICD-10-CM: I50.21  ICD-9-CM: 428.21  4/24/2018        Altered mental status, unspecified ICD-10-CM: R41.82  ICD-9-CM: 780.97  4/23/2018        CKD (chronic kidney disease) stage 3, GFR 30-59 ml/min ICD-10-CM: N18.3  ICD-9-CM: 585.3  10/25/2017        Vitamin D deficiency ICD-10-CM: E55.9  ICD-9-CM: 268.9  6/16/2017        Asymptomatic hyperuricemia ICD-10-CM: E79.0  ICD-9-CM: 790.6  2/16/2017        Statin intolerance ICD-10-CM: Z78.9  ICD-9-CM: 995.27  12/21/2016        Long-term use of immunosuppressant medication ICD-10-CM: Z79.899  ICD-9-CM: V58.69  11/21/2016        Seropositive rheumatoid arthritis of multiple sites [facility]) ICD-10-CM: M05.79  ICD-9-CM: 714.0  9/28/2016        Primary osteoarthritis of both knees ICD-10-CM: M17.0  ICD-9-CM: 715.16  9/28/2016        Occlusion and stenosis of carotid artery without mention of cerebral infarction ICD-10-CM: I65.29  ICD-9-CM: 433.10  8/23/2013        Weakness due to cerebrovascular accident ICD-10-CM: ADJ8893  ICD-9-CM: Marcia Whitaker  4/2/2012        Neuropathy in diabetes [facility]) ICD-10-CM: E11.40  ICD-9-CM: 250.60, 357.2  4/2/2012        PAD (peripheral artery disease) ([facility] ICD-10-CM: I73.9  ICD-9-CM: 443.9  9/7/2011        Carotid bruit ICD-10-CM: R09.89  ICD-9-CM: 785.9  8/31/2011        Claudication ([facility] ICD-10-CM: I73.9  ICD-9-CM: 443.9  8/31/2011        Weakness of left arm ICD-10-CM: R29.898  ICD-9-CM: 729.89  8/31/2011        Hyperlipidemia ICD-10-CM: E78.5  ICD-9-CM: 272.4  1/27/2010        DM (diabetes mellitus) ([facility] ICD-10-CM: E11.9  ICD-9-CM: 250.00  9/2/2009        Hypothyroid ICD-10-CM: E03.9  ICD-9-CM: 244.9  9/2/2009        Colon cancer ([facility] ICD-10-CM: C18.9  ICD-9-CM: 153.9  9/2/2009        H/O: CVA ICD-9-CM: V12.54  9/2/2009        Microalbuminuria ICD-10-CM: R80.9  ICD-9-CM: 791.0  9/2/2009        Age-related osteoporosis without current pathological fracture ICD-10-CM: M81.0  ICD-9-CM: 733.01  9/2/2009        Essential hypertension ICD-10-CM: I10  ICD-9-CM: 401.9  9/2/2009        Anemia ICD-10-CM: D64.9  ICD-9-CM: 285.9  9/2/2009              Current Outpatient Prescriptions on File Prior to Visit   Medication Sig    furosemide (LASIX) 40 mg tablet Take 2 Tabs by mouth daily.  ondansetron (ZOFRAN ODT) 4 mg disintegrating tablet Take 4 mg by mouth every four (4) hours as needed for Nausea.  acetaminophen (TYLENOL) 325 mg tablet Take 2 Tabs by mouth every four (4) hours as needed.  b-complex with vitamin c tablet Take 1 Tab by mouth daily.  insulin glargine (LANTUS) 100 unit/mL injection 10 Units by SubCUTAneous route daily. Indications: type 2 diabetes mellitus    methyl salicylate-menthol (BENGAY) 15-10 % topical cream Apply  to affected area as needed for Pain.  APPLY TO right shoulder    Nursing, document site in comments    simethicone (MYLICON) 80 mg chewable tablet Take 1 Tab by mouth as needed. Indications: FLATULENCE    nitroglycerin (NITROSTAT) 0.4 mg SL tablet 1 Tab by SubLINGual route as needed for Chest Pain. Up to 3 doses.  clopidogrel (PLAVIX) 75 mg tab Take 1 Tab by mouth daily.  carvedilol (COREG) 6.25 mg tablet Take 1 Tab by mouth two (2) times daily (with meals).  anastrozole (ARIMIDEX) 1 mg tablet Take 1 Tab by mouth daily.  L. acidoph & paracasei- S therm- Bifido (BERYL-Q/RISAQUAD) 8 billion cell cap cap Take 1 Cap by mouth daily.  polyethylene glycol (MIRALAX) 17 gram packet Take 1 Packet by mouth daily.  levothyroxine (SYNTHROID) 88 mcg tablet Take 88 mcg by mouth Daily (before breakfast).  folic acid (FOLVITE) 1 mg tablet TAKE ONE TABLET BY MOUTH DAILY    aspirin delayed-release 81 mg tablet Take 81 mg by mouth daily.  OMEGA-3 FATTY ACIDS/FISH OIL (OMEGA 3 FISH OIL PO) Take 300 mg by mouth daily.  CHOLECALCIFEROL, VITAMIN D3, (VITAMIN D-3 PO) Take 1,000 Units by mouth daily. No current facility-administered medications on file prior to visit. CARDIOLOGY STUDIES TO DATE:   echo lvef 30%, trivial pericardial effusion      Chief Complaint   Patient presents with    Coronary Artery Disease     HPI :  Ms. Tiffany Ramirez is doing remarkably well and is due to be discharged from rehab later this week. She is doing therapy there and she has not been back to the hospital for about six weeks. She has minimal shortness of breath with activity and no angina. They are trying to get hooked up with an endocrinologist to help better manage her diabetes.           CARDIAC ROS:   negative for chest pain, dyspnea, palpitations, syncope, orthopnea, paroxysmal nocturnal dyspnea, exertional chest pressure/discomfort, claudication, lower extremity edema    Family History   Problem Relation Age of Onset    Diabetes Mother     Stroke Mother     Diabetes Sister     Other Sister      fell and hit her head -  of this   Aelius.Apolinar Diabetes Brother     Cancer Father      stomach    Diabetes Sister        Past Medical History:   Diagnosis Date    Anemia 9/2/2009    Colon cancer ([facility] 9/2/2009    surgery/chemo    Colon cancer ([facility] 9/2/2009    DM (diabetes mellitus) ([facility] 9/2/2009    GERD (gastroesophageal reflux disease)     H/O: CVA 9/2/2009    slight l sided weakness    Hypothyroid 9/2/2009    Ill-defined condition     seasonal allergies    Microalbuminuria 9/2/2009    Osteoporosis 9/2/2009    Other and unspecified hyperlipidemia 1/27/2010    [facility] 9/28/2016    Rheumatoid arthritis(714.0) 9/2/2009    Unspecified essential hypertension 9/2/2009    Weakness due to cerebrovascular accident        GENERAL ROS:  A comprehensive review of systems was negative except for that written in the HPI.     Visit Vitals    /50 (BP 1 Location: Right arm, BP Patient Position: Sitting)    Pulse 72    Resp 16    Ht 5' 5\" (1.651 m)    Wt 120 lb 3.2 oz (54.5 kg)    BMI 20 kg/m2       Wt Readings from Last 3 Encounters:   08/20/18 120 lb 3.2 oz (54.5 kg)   07/14/18 125 lb (56.7 kg)   07/06/18 119 lb 11.2 oz (54.3 kg)            BP Readings from Last 3 Encounters:   08/20/18 118/50   07/14/18 112/60   07/06/18 157/75       PHYSICAL EXAM  General appearance: alert, cooperative, no distress, appears stated age  Neck: supple, symmetrical, trachea midline, no adenopathy, no carotid bruit and no JVD  Lungs: clear to auscultation bilaterally  Heart: regular rate and rhythm, S1, S2 normal, 2/6 sys murmur, no click, rub or gallop  Extremities: extremities normal, atraumatic, no cyanosis or edema    Lab Results   Component Value Date/Time    Cholesterol, total 74 04/16/2018 04:21 AM    Cholesterol, total 206 (H) 02/07/2017 09:07 AM    Cholesterol, total 135 11/23/2012 09:09 AM    Cholesterol, total 101 11/30/2011 08:30 AM    Cholesterol, total 147 07/25/2011 08:44 AM    HDL Cholesterol 25 04/16/2018 04:21 AM HDL Cholesterol 36 (L) 02/07/2017 09:07 AM    HDL Cholesterol 42 11/23/2012 09:09 AM    HDL Cholesterol 31 (L) 11/30/2011 08:30 AM    HDL Cholesterol 45 07/25/2011 08:44 AM    LDL, calculated 31.6 04/16/2018 04:21 AM    LDL, calculated 128 (H) 02/07/2017 09:07 AM    LDL, calculated 74 11/23/2012 09:09 AM    LDL, calculated 55 11/30/2011 08:30 AM    LDL, calculated 83 07/25/2011 08:44 AM    Triglyceride 87 04/16/2018 04:21 AM    Triglyceride 209 (H) 02/07/2017 09:07 AM    Triglyceride 95 11/23/2012 09:09 AM    Triglyceride 74 11/30/2011 08:30 AM    Triglyceride 93 07/25/2011 08:44 AM    CHOL/HDL Ratio 3.0 04/16/2018 04:21 AM    CHOL/HDL Ratio 3.2 06/17/2010 09:33 AM    CHOL/HDL Ratio 2.8 02/12/2009 08:19 AM     ASSESSMENT  Ms. Harrison Jerome is stable, asymptomatic and well compensated at this point on a good medical regimen and needs no cardiac testing at this time. current treatment plan is effective, no change in therapy  lab results and schedule of future lab studies reviewed with patient  reviewed diet, exercise and weight control    Encounter Diagnoses   Name Primary?  Coronary artery disease involving native coronary artery of native heart without angina pectoris Yes    Mixed hyperlipidemia     Essential hypertension     CKD (chronic kidney disease) stage 3, GFR 30-59 ml/min     Metastatic breast cancer (HCC)     Statin intolerance     Ischemic cardiomyopathy      Orders Placed This Encounter    docusate sodium (COLACE) 100 mg capsule    ascorbate calcium (MAKAYLA-C PO)    insulin lispro (HUMALOG U-100 INSULIN) 100 unit/mL injection    magnesium hydroxide (Mempile MILK OF MAGNESIA) 400 mg/5 mL suspension    teriparatide (FORTEO) 20 mcg/dose - 600 mcg/2.4 mL pnij injection    palbociclib (IBRANCE) 125 mg cap    denosumab (XGEVA SC)    evolocumab (Tangela Silverio) pen injection       Follow-up Disposition:  Return in about 3 months (around 11/20/2018).     Bishnu Borden MD  8/20/2018

## 2018-08-22 ENCOUNTER — PATIENT OUTREACH (OUTPATIENT)
Dept: CARDIOLOGY CLINIC | Age: 83
End: 2018-08-22

## 2018-08-22 NOTE — PROGRESS NOTES
Patient has graduated from the Transitions of Care Coordination  program on 8/22/18. Patient currently residing at The [address] [place] [address]. Attended follow up with Dr. Mina Coffman. Care management goals have been completed at this time. No further nurse navigator follow up scheduled. Goals Addressed      COMPLETED: Attends follow-up appointments as directed. 6/7/18  Pt to see Dr. Lisa Thomas on 6/15 12:30   appt verified- [address] [place] [address] informed    Cardiology appt for week of 6/11-15- appt made for 6/13 1:40 Dr. Anila Garcia  Nn to call HS on 6/7 - for nurse Steve -     Pt under care of Dr. Skyler Fernandez - at [address] [place] [address]. Raisa Carlisle RN , CHFN, [facility]  [facility]  [phone]       COMPLETED: Knowledge and adherence of prescribed medication (ie. action, side effects, missed dose, etc.).                  5/15/18  Complex medication list - with multiple new meds/ and stopping several meds -   Request to HS - for med list - to compare and review any changes. 6/7/18 -   Pt D/C with NTG 0.4 mg prn - stop Aldactone  Monitor K and phosphorus - fax to [address] [place] [address] by Dr. Teri Marrufo at [facility]. Raisa Carlisle RN , CHFN, [facility]  [phone]       COMPLETED: Supportive resources in place to maintain patient in the community (ie. Home Health, DME equipment, refer to, medication assistant plan, etc.)                  5/15/18  Pt discharged to [address] [place] [address] - rehab - on 5/11 - for strengthening/ debility  Pt has follow up appt with cardiology 5/25,   Oncology in 3 weeks - Rheumatology - 3 weeks - RA infusions. ttk    6/7/18   Discharge to  - rehab on 6/7 - after readmit for chest pain / HFrEF/   D/C to [address] [place] [address] - with daily am weights/ active rehab and condition mgmt  ttk            Pt has nurse navigator's contact information for any further questions, concerns, or needs.   Patients upcoming visits:    Future Appointments  Date Time Provider Prasanna Diana   10/22/2018 11:40 AM Tea Banerjee  [address]

## 2018-09-18 DIAGNOSIS — I50.21 ACUTE SYSTOLIC HEART FAILURE (HCC): ICD-10-CM

## 2018-09-18 DIAGNOSIS — I10 ESSENTIAL HYPERTENSION: Primary | ICD-10-CM

## 2018-09-18 RX ORDER — FUROSEMIDE 40 MG/1
80 TABLET ORAL DAILY
Qty: 60 TAB | Refills: 5 | Status: SHIPPED | OUTPATIENT
Start: 2018-09-18 | End: 2019-06-24 | Stop reason: SDUPTHER

## 2018-09-18 RX ORDER — CARVEDILOL 6.25 MG/1
6.25 TABLET ORAL 2 TIMES DAILY WITH MEALS
Qty: 60 TAB | Refills: 5 | Status: SHIPPED | OUTPATIENT
Start: 2018-09-18 | End: 2019-03-24 | Stop reason: SDUPTHER

## 2018-09-18 NOTE — TELEPHONE ENCOUNTER
Requested Prescriptions     Signed Prescriptions Disp Refills    furosemide (LASIX) 40 mg tablet 60 Tab 5     Sig: Take 2 Tabs by mouth daily. Authorizing Provider: Kaylie Oconnell User: Missy Gonzalez carvedilol (COREG) 6.25 mg tablet 60 Tab 5     Sig: Take 1 Tab by mouth two (2) times daily (with meals).      Authorizing Provider: Kaylie Oconnell User: Binh Valente    Per Dr. Lola Olivia verbal orders

## 2018-10-19 ENCOUNTER — OFFICE VISIT (OUTPATIENT)
Dept: CARDIOLOGY CLINIC | Age: 83
End: 2018-10-19

## 2018-10-19 VITALS
WEIGHT: 120 LBS | SYSTOLIC BLOOD PRESSURE: 110 MMHG | HEART RATE: 81 BPM | BODY MASS INDEX: 19.99 KG/M2 | RESPIRATION RATE: 18 BRPM | DIASTOLIC BLOOD PRESSURE: 60 MMHG | OXYGEN SATURATION: 97 % | HEIGHT: 65 IN

## 2018-10-19 DIAGNOSIS — I10 ESSENTIAL HYPERTENSION: ICD-10-CM

## 2018-10-19 DIAGNOSIS — E78.2 MIXED HYPERLIPIDEMIA: ICD-10-CM

## 2018-10-19 DIAGNOSIS — I25.5 ISCHEMIC CARDIOMYOPATHY: Primary | ICD-10-CM

## 2018-10-19 DIAGNOSIS — I25.10 CORONARY ARTERY DISEASE INVOLVING NATIVE CORONARY ARTERY OF NATIVE HEART WITHOUT ANGINA PECTORIS: ICD-10-CM

## 2018-10-19 RX ORDER — POTASSIUM CHLORIDE 20 MEQ/1
TABLET, EXTENDED RELEASE ORAL EVERY OTHER DAY
COMMUNITY
End: 2019-01-11 | Stop reason: ALTCHOICE

## 2018-10-19 NOTE — PROGRESS NOTES
HISTORY OF PRESENT ILLNESS  Eliazar Aquino is a 82 y.o. female     SUMMARY:   Problem List  Date Reviewed: 10/19/2018          Codes Class Noted    Type 2 diabetes with nephropathy ([facility] ICD-10-CM: E11.21  ICD-9-CM: 250.40, 583.81  8/20/2018        Acute respiratory failure ([facility] ICD-10-CM: J96.00  ICD-9-CM: 518.81  6/21/2018        Elevated troponin ICD-10-CM: R74.8  ICD-9-CM: 790.6  6/21/2018        CAD (coronary artery disease) ICD-10-CM: I25.10  ICD-9-CM: 414.00  6/21/2018        Acute renal failure superimposed on stage 3 chronic kidney disease ([facility] ICD-10-CM: N17.9, N18.3  ICD-9-CM: 584.9, 585.3  6/21/2018        Gout flare ICD-10-CM: M10.9  ICD-9-CM: 274.01  6/21/2018        Hyperglycemia due to type 2 diabetes mellitus ([place] [address].) ICD-10-CM: E11.65  ICD-9-CM: 250.00  6/21/2018        Metastatic breast cancer ([place] [address].) ICD-10-CM: C50.919  ICD-9-CM: 174.9  6/1/2018        Goals of care, counseling/discussion ICD-10-CM: Z71.89  ICD-9-CM: V65.49  6/1/2018        Acute on chronic systolic HF (heart failure) ([place] [address].) ICD-10-CM: I50.23  ICD-9-CM: 428.23  5/19/2018        Acute systolic heart failure ([facility] ICD-10-CM: I50.21  ICD-9-CM: 428.21  4/24/2018        Altered mental status, unspecified ICD-10-CM: R41.82  ICD-9-CM: 780.97  4/23/2018        CKD (chronic kidney disease) stage 3, GFR 30-59 ml/min ([facility]) ICD-10-CM: N18.3  ICD-9-CM: 585.3  10/25/2017        Vitamin D deficiency ICD-10-CM: E55.9  ICD-9-CM: 268.9  6/16/2017        Asymptomatic hyperuricemia ICD-10-CM: E79.0  ICD-9-CM: 790.6  2/16/2017        Statin intolerance ICD-10-CM: Z78.9  ICD-9-CM: 995.27  12/21/2016        Long-term use of immunosuppressant medication ICD-10-CM: Z79.899  ICD-9-CM: V58.69  11/21/2016        Seropositive rheumatoid arthritis of multiple sites [facility]) ICD-10-CM: M05.79  ICD-9-CM: 714.0  9/28/2016        Primary osteoarthritis of both knees ICD-10-CM: M17.0  ICD-9-CM: 715.16  9/28/2016        Occlusion and stenosis of carotid artery without mention of cerebral infarction ICD-10-CM: I65.29  ICD-9-CM: 433.10  8/23/2013        Weakness due to cerebrovascular accident ICD-10-CM: [id number]  ICD-9-CM: Luiz Vizcarra  4/2/2012        Neuropathy in diabetes [facility]) ICD-10-CM: E11.40  ICD-9-CM: 250.60, 357.2  4/2/2012        PAD (peripheral artery disease) ([facility] ICD-10-CM: I73.9  ICD-9-CM: 443.9  9/7/2011        Carotid bruit ICD-10-CM: R09.89  ICD-9-CM: 785.9  8/31/2011        Claudication ([facility] ICD-10-CM: I73.9  ICD-9-CM: 443.9  8/31/2011        Weakness of left arm ICD-10-CM: R29.898  ICD-9-CM: 729.89  8/31/2011        Hyperlipidemia ICD-10-CM: E78.5  ICD-9-CM: 272.4  1/27/2010        DM (diabetes mellitus) ([facility] ICD-10-CM: E11.9  ICD-9-CM: 250.00  9/2/2009        Hypothyroid ICD-10-CM: E03.9  ICD-9-CM: 244.9  9/2/2009        Colon cancer ([facility] ICD-10-CM: C18.9  ICD-9-CM: 153.9  9/2/2009        H/O: CVA ICD-9-CM: V12.54  9/2/2009        Microalbuminuria ICD-10-CM: R80.9  ICD-9-CM: 791.0  9/2/2009        Age-related osteoporosis without current pathological fracture ICD-10-CM: M81.0  ICD-9-CM: 733.01  9/2/2009        Essential hypertension ICD-10-CM: I10  ICD-9-CM: 401.9  9/2/2009        Anemia ICD-10-CM: D64.9  ICD-9-CM: 285.9  9/2/2009              Current Outpatient Medications on File Prior to Visit   Medication Sig    potassium chloride (K-DUR, KLOR-CON) 20 mEq tablet Take  by mouth every other day.  furosemide (LASIX) 40 mg tablet Take 2 Tabs by mouth daily. (Patient taking differently: Take 40 mg by mouth daily.)    carvedilol (COREG) 6.25 mg tablet Take 1 Tab by mouth two (2) times daily (with meals).  ascorbate calcium (MAKAYLA-C PO) Take 1,000 mg by mouth daily.  insulin lispro (HUMALOG U-100 INSULIN) 100 unit/mL injection by SubCUTAneous route. Sliding scale    evolocumab (REPATHA SURECLICK) pen injection by SubCUTAneous route.  acetaminophen (TYLENOL) 325 mg tablet Take 2 Tabs by mouth every four (4) hours as needed.     b-complex with vitamin c tablet Take 1 Tab by mouth daily.  nitroglycerin (NITROSTAT) 0.4 mg SL tablet 1 Tab by SubLINGual route as needed for Chest Pain. Up to 3 doses.  clopidogrel (PLAVIX) 75 mg tab Take 1 Tab by mouth daily.  anastrozole (ARIMIDEX) 1 mg tablet Take 1 Tab by mouth daily.  polyethylene glycol (MIRALAX) 17 gram packet Take 1 Packet by mouth daily.  levothyroxine (SYNTHROID) 88 mcg tablet Take 88 mcg by mouth Daily (before breakfast).  folic acid (FOLVITE) 1 mg tablet TAKE ONE TABLET BY MOUTH DAILY    aspirin delayed-release 81 mg tablet Take 81 mg by mouth daily.  OMEGA-3 FATTY ACIDS/FISH OIL (OMEGA 3 FISH OIL PO) Take 300 mg by mouth daily.  CHOLECALCIFEROL, VITAMIN D3, (VITAMIN D-3 PO) Take 1,000 Units by mouth daily.  FORTEO 20 mcg/dose - 600 mcg/2.4 mL pnij injection INJECT 20 MCG ONCE DAILY    magnesium hydroxide (CORONA MILK OF MAGNESIA) 400 mg/5 mL suspension Take 30 mL by mouth daily as needed for Constipation.  palbociclib (IBRANCE) 125 mg cap Take  by mouth daily. Indications: 21 days on medication, 7 days off medication    methyl salicylate-menthol (BENGAY) 15-10 % topical cream Apply  to affected area as needed for Pain. APPLY TO right shoulder    Nursing, document site in comments    simethicone (MYLICON) 80 mg chewable tablet Take 1 Tab by mouth as needed. Indications: FLATULENCE     No current facility-administered medications on file prior to visit. CARDIOLOGY STUDIES TO DATE:  6/18 echo lvef 30%, trivial pericardial effusion    Chief Complaint   Patient presents with    Coronary Artery Disease     HPI :  Ms. Martín Saldaña is doing pretty well and has not been back to the hospital since before we last met. She is at home out of rehab and recently had a UTI and some worsening of her chronic renal insufficiency.   She also had some hyperkalemia, so her nephrologist cut her Lasix down to 40 mg a day and her potassium supplement to every other day and they have followup with them next week. She is having minimal shortness of breath with activity and no significant swelling. Weight has been stable. CARDIAC ROS:   negative for chest pain, palpitations, syncope, orthopnea, paroxysmal nocturnal dyspnea, exertional chest pressure/discomfort, claudication    Family History   Problem Relation Age of Onset    Diabetes Mother     Stroke Mother     Diabetes Sister     Other Sister         fell and hit her head -  of this   Draper.Mir Diabetes Brother     Cancer Father         stomach    Diabetes Sister        Past Medical History:   Diagnosis Date    Anemia 2009    Colon cancer ([facility] 2009    surgery/chemo    Colon cancer ([facility] 2009    DM (diabetes mellitus) ([facility] 2009    GERD (gastroesophageal reflux disease)     H/O: CVA 2009    slight l sided weakness    Hypothyroid 2009    Ill-defined condition     seasonal allergies    Microalbuminuria 2009    Osteoporosis 2009    Other and unspecified hyperlipidemia 2010    Rheumatoid arthritis involving ankle ([facility] 2016    Rheumatoid arthritis(714.0) 2009    Unspecified essential hypertension 2009    Weakness due to cerebrovascular accident        GENERAL ROS:  A comprehensive review of systems was negative except for that written in the HPI.     Visit Vitals  /60 (BP 1 Location: Left arm, BP Patient Position: Sitting)   Pulse 81   Resp 18   Ht 5' 5\" (1.651 m)   Wt 120 lb (54.4 kg)   SpO2 97%   BMI 19.97 kg/m²       Wt Readings from Last 3 Encounters:   10/19/18 120 lb (54.4 kg)   18 120 lb 3.2 oz (54.5 kg)   18 125 lb (56.7 kg)            BP Readings from Last 3 Encounters:   10/19/18 110/60   18 118/50   18 112/60       PHYSICAL EXAM  General appearance: alert, cooperative, no distress, appears stated age  Neurologic: Alert and oriented X 3  Neck: supple, symmetrical, trachea midline, no adenopathy, no carotid bruit and no JVD  Lungs: clear to auscultation bilaterally  Heart: regular rate and rhythm, S1, S2 normal, no S3 or S4, systolic murmur: early systolic 1/6, crescendo at 2nd right intercostal space  Extremities: extremities normal, atraumatic, no cyanosis or edema    Lab Results   Component Value Date/Time    Cholesterol, total 74 04/16/2018 04:21 AM    Cholesterol, total 206 (H) 02/07/2017 09:07 AM    Cholesterol, total 135 11/23/2012 09:09 AM    Cholesterol, total 101 11/30/2011 08:30 AM    Cholesterol, total 147 07/25/2011 08:44 AM    HDL Cholesterol 25 04/16/2018 04:21 AM    HDL Cholesterol 36 (L) 02/07/2017 09:07 AM    HDL Cholesterol 42 11/23/2012 09:09 AM    HDL Cholesterol 31 (L) 11/30/2011 08:30 AM    HDL Cholesterol 45 07/25/2011 08:44 AM    LDL, calculated 31.6 04/16/2018 04:21 AM    LDL, calculated 128 (H) 02/07/2017 09:07 AM    LDL, calculated 74 11/23/2012 09:09 AM    LDL, calculated 55 11/30/2011 08:30 AM    LDL, calculated 83 07/25/2011 08:44 AM    Triglyceride 87 04/16/2018 04:21 AM    Triglyceride 209 (H) 02/07/2017 09:07 AM    Triglyceride 95 11/23/2012 09:09 AM    Triglyceride 74 11/30/2011 08:30 AM    Triglyceride 93 07/25/2011 08:44 AM    CHOL/HDL Ratio 3.0 04/16/2018 04:21 AM    CHOL/HDL Ratio 3.2 06/17/2010 09:33 AM    CHOL/HDL Ratio 2.8 02/12/2009 08:19 AM     ASSESSMENT  Ms. Alphonse Nobles is stable at this point, well compensated on a good medical regimen and needs no cardiac testing at this time. current treatment plan is effective, no change in therapy  lab results and schedule of future lab studies reviewed with patient  reviewed diet, exercise and weight control    Encounter Diagnoses   Name Primary?     Ischemic cardiomyopathy Yes    Coronary artery disease involving native coronary artery of native heart without angina pectoris     Mixed hyperlipidemia     Essential hypertension      Orders Placed This Encounter    potassium chloride (K-DUR, KLOR-CON) 20 mEq tablet       Follow-up Disposition:  Return in about 3 months (around 1/19/2019).     Pebbles Kelley MD  10/19/2018

## 2018-11-13 DIAGNOSIS — I25.10 CORONARY ARTERY DISEASE INVOLVING NATIVE CORONARY ARTERY, ANGINA PRESENCE UNSPECIFIED, UNSPECIFIED WHETHER NATIVE OR TRANSPLANTED HEART: Primary | ICD-10-CM

## 2018-11-13 RX ORDER — CLOPIDOGREL BISULFATE 75 MG/1
75 TABLET ORAL DAILY
Qty: 30 TAB | Refills: 5 | Status: SHIPPED | OUTPATIENT
Start: 2018-11-13 | End: 2019-05-05 | Stop reason: SDUPTHER

## 2018-11-13 NOTE — TELEPHONE ENCOUNTER
Requested Prescriptions     Signed Prescriptions Disp Refills    clopidogrel (PLAVIX) 75 mg tab 30 Tab 5     Sig: Take 1 Tab by mouth daily.      Authorizing Provider: Jae Soriano     Ordering User: Kalee Alvarez    Per Dr. Yohana Hendricks verbal orders

## 2018-12-11 RX ORDER — TERIPARATIDE 250 UG/ML
INJECTION, SOLUTION SUBCUTANEOUS
Qty: 2.4 ML | Refills: 0 | Status: SHIPPED | OUTPATIENT
Start: 2018-12-11 | End: 2019-05-30

## 2019-01-01 ENCOUNTER — APPOINTMENT (OUTPATIENT)
Dept: CT IMAGING | Age: 84
DRG: 149 | End: 2019-01-01
Attending: EMERGENCY MEDICINE
Payer: MEDICARE

## 2019-01-01 ENCOUNTER — APPOINTMENT (OUTPATIENT)
Dept: PHYSICAL THERAPY | Age: 84
End: 2019-01-01
Payer: MEDICARE

## 2019-01-01 ENCOUNTER — TELEPHONE (OUTPATIENT)
Dept: FAMILY MEDICINE CLINIC | Age: 84
End: 2019-01-01

## 2019-01-01 ENCOUNTER — HOSPITAL ENCOUNTER (INPATIENT)
Age: 84
LOS: 1 days | Discharge: HOME | DRG: 149 | End: 2019-08-25
Attending: EMERGENCY MEDICINE | Admitting: INTERNAL MEDICINE
Payer: MEDICARE

## 2019-01-01 ENCOUNTER — HOSPITAL ENCOUNTER (OUTPATIENT)
Dept: PHYSICAL THERAPY | Age: 84
Discharge: HOME | End: 2019-09-04
Payer: MEDICARE

## 2019-01-01 ENCOUNTER — HOSPITAL ENCOUNTER (INPATIENT)
Age: 84
LOS: 12 days | Discharge: NURSING, REHAB OR LONG-TERM CARE | DRG: 871 | End: 2020-01-06
Attending: EMERGENCY MEDICINE | Admitting: ANESTHESIOLOGY
Payer: MEDICARE

## 2019-01-01 ENCOUNTER — OFFICE VISIT (OUTPATIENT)
Dept: CARDIOLOGY CLINIC | Age: 84
End: 2019-01-01

## 2019-01-01 ENCOUNTER — PATIENT OUTREACH (OUTPATIENT)
Dept: FAMILY MEDICINE CLINIC | Age: 84
End: 2019-01-01

## 2019-01-01 ENCOUNTER — APPOINTMENT (OUTPATIENT)
Dept: GENERAL RADIOLOGY | Age: 84
DRG: 871 | End: 2019-01-01
Attending: SPECIALIST
Payer: MEDICARE

## 2019-01-01 ENCOUNTER — HOSPITAL ENCOUNTER (OUTPATIENT)
Dept: PHYSICAL THERAPY | Age: 84
End: 2019-01-01

## 2019-01-01 ENCOUNTER — APPOINTMENT (OUTPATIENT)
Dept: PHYSICAL THERAPY | Age: 84
End: 2019-01-01

## 2019-01-01 ENCOUNTER — HOSPITAL ENCOUNTER (OUTPATIENT)
Dept: GENERAL RADIOLOGY | Age: 84
Discharge: HOME | End: 2019-10-01
Attending: NURSE PRACTITIONER
Payer: MEDICARE

## 2019-01-01 ENCOUNTER — APPOINTMENT (OUTPATIENT)
Dept: GENERAL RADIOLOGY | Age: 84
DRG: 871 | End: 2019-01-01
Attending: EMERGENCY MEDICINE
Payer: MEDICARE

## 2019-01-01 ENCOUNTER — APPOINTMENT (OUTPATIENT)
Dept: VASCULAR SURGERY | Age: 84
DRG: 871 | End: 2019-01-01
Attending: HOSPITALIST
Payer: MEDICARE

## 2019-01-01 ENCOUNTER — APPOINTMENT (OUTPATIENT)
Dept: MRI IMAGING | Age: 84
DRG: 149 | End: 2019-01-01
Attending: INTERNAL MEDICINE
Payer: MEDICARE

## 2019-01-01 ENCOUNTER — APPOINTMENT (OUTPATIENT)
Dept: GENERAL RADIOLOGY | Age: 84
DRG: 871 | End: 2019-01-01
Attending: HOSPITALIST
Payer: MEDICARE

## 2019-01-01 ENCOUNTER — APPOINTMENT (OUTPATIENT)
Dept: NON INVASIVE DIAGNOSTICS | Age: 84
DRG: 871 | End: 2019-01-01
Attending: INTERNAL MEDICINE
Payer: MEDICARE

## 2019-01-01 VITALS
DIASTOLIC BLOOD PRESSURE: 58 MMHG | OXYGEN SATURATION: 100 % | BODY MASS INDEX: 21.17 KG/M2 | RESPIRATION RATE: 19 BRPM | WEIGHT: 127.21 LBS | HEART RATE: 69 BPM | TEMPERATURE: 98.2 F | SYSTOLIC BLOOD PRESSURE: 153 MMHG

## 2019-01-01 VITALS
WEIGHT: 132 LBS | HEIGHT: 65 IN | SYSTOLIC BLOOD PRESSURE: 114 MMHG | RESPIRATION RATE: 12 BRPM | OXYGEN SATURATION: 98 % | HEART RATE: 86 BPM | BODY MASS INDEX: 21.99 KG/M2 | DIASTOLIC BLOOD PRESSURE: 62 MMHG

## 2019-01-01 DIAGNOSIS — N30.00 ACUTE CYSTITIS WITHOUT HEMATURIA: ICD-10-CM

## 2019-01-01 DIAGNOSIS — I10 HTN, GOAL BELOW 140/90: ICD-10-CM

## 2019-01-01 DIAGNOSIS — E78.2 MIXED HYPERLIPIDEMIA: ICD-10-CM

## 2019-01-01 DIAGNOSIS — I50.21 ACUTE SYSTOLIC HEART FAILURE (HCC): ICD-10-CM

## 2019-01-01 DIAGNOSIS — N18.30 CKD (CHRONIC KIDNEY DISEASE) STAGE 3, GFR 30-59 ML/MIN (HCC): ICD-10-CM

## 2019-01-01 DIAGNOSIS — I25.5 ISCHEMIC CARDIOMYOPATHY: ICD-10-CM

## 2019-01-01 DIAGNOSIS — N28.9 ACUTE ON CHRONIC RENAL INSUFFICIENCY: ICD-10-CM

## 2019-01-01 DIAGNOSIS — J11.00 INFLUENZA AND PNEUMONIA: ICD-10-CM

## 2019-01-01 DIAGNOSIS — I10 ESSENTIAL HYPERTENSION: ICD-10-CM

## 2019-01-01 DIAGNOSIS — H81.10 BENIGN PAROXYSMAL POSITIONAL VERTIGO, UNSPECIFIED LATERALITY: ICD-10-CM

## 2019-01-01 DIAGNOSIS — R42 DIZZINESS: Primary | ICD-10-CM

## 2019-01-01 DIAGNOSIS — A41.9 SEPSIS, DUE TO UNSPECIFIED ORGANISM, UNSPECIFIED WHETHER ACUTE ORGAN DYSFUNCTION PRESENT (HCC): Primary | ICD-10-CM

## 2019-01-01 DIAGNOSIS — I25.10 CORONARY ARTERY DISEASE INVOLVING NATIVE CORONARY ARTERY OF NATIVE HEART WITHOUT ANGINA PECTORIS: Primary | ICD-10-CM

## 2019-01-01 DIAGNOSIS — N18.9 ACUTE ON CHRONIC RENAL INSUFFICIENCY: ICD-10-CM

## 2019-01-01 LAB
ALBUMIN SERPL-MCNC: 2.8 G/DL (ref 3.5–5)
ALBUMIN SERPL-MCNC: 3.1 G/DL (ref 3.5–5)
ALBUMIN SERPL-MCNC: 3.4 G/DL (ref 3.5–5)
ALBUMIN/GLOB SERPL: 0.8 {RATIO} (ref 1.1–2.2)
ALBUMIN/GLOB SERPL: 0.8 {RATIO} (ref 1.1–2.2)
ALBUMIN/GLOB SERPL: 0.9 {RATIO} (ref 1.1–2.2)
ALP SERPL-CCNC: 116 U/L (ref 45–117)
ALP SERPL-CCNC: 187 U/L (ref 45–117)
ALP SERPL-CCNC: 290 U/L (ref 45–117)
ALT SERPL-CCNC: 23 U/L (ref 12–78)
ALT SERPL-CCNC: 45 U/L (ref 12–78)
ALT SERPL-CCNC: 67 U/L (ref 12–78)
ANION GAP SERPL CALC-SCNC: 5 MMOL/L (ref 5–15)
ANION GAP SERPL CALC-SCNC: 6 MMOL/L (ref 5–15)
ANION GAP SERPL CALC-SCNC: 7 MMOL/L (ref 5–15)
ANION GAP SERPL CALC-SCNC: 8 MMOL/L (ref 5–15)
ANION GAP SERPL CALC-SCNC: 9 MMOL/L (ref 5–15)
APPEARANCE UR: ABNORMAL
APPEARANCE UR: ABNORMAL
APTT PPP: 33.4 SEC (ref 22.1–32)
ARTERIAL PATENCY WRIST A: YES
AST SERPL-CCNC: 25 U/L (ref 15–37)
AST SERPL-CCNC: 38 U/L (ref 15–37)
AST SERPL-CCNC: 83 U/L (ref 15–37)
ATRIAL RATE: 102 BPM
ATRIAL RATE: 122 BPM
ATRIAL RATE: 74 BPM
ATRIAL RATE: 97 BPM
BACTERIA SPEC CULT: ABNORMAL
BACTERIA URNS QL MICRO: ABNORMAL /HPF
BACTERIA URNS QL MICRO: ABNORMAL /HPF
BASE DEFICIT BLD-SCNC: 7 MMOL/L
BASOPHILS # BLD: 0 K/UL (ref 0–0.1)
BASOPHILS # BLD: 0.1 K/UL (ref 0–0.1)
BASOPHILS # BLD: 0.1 K/UL (ref 0–0.1)
BASOPHILS # BLD: 0.2 K/UL (ref 0–0.1)
BASOPHILS NFR BLD: 0 % (ref 0–1)
BASOPHILS NFR BLD: 1 % (ref 0–1)
BDY SITE: ABNORMAL
BILIRUB SERPL-MCNC: 0.4 MG/DL (ref 0.2–1)
BILIRUB SERPL-MCNC: 0.5 MG/DL (ref 0.2–1)
BILIRUB SERPL-MCNC: 1.1 MG/DL (ref 0.2–1)
BILIRUB UR QL: NEGATIVE
BILIRUB UR QL: NEGATIVE
BUN SERPL-MCNC: 31 MG/DL (ref 6–20)
BUN SERPL-MCNC: 35 MG/DL (ref 6–20)
BUN SERPL-MCNC: 35 MG/DL (ref 6–20)
BUN SERPL-MCNC: 36 MG/DL (ref 6–20)
BUN SERPL-MCNC: 37 MG/DL (ref 6–20)
BUN SERPL-MCNC: 37 MG/DL (ref 6–20)
BUN SERPL-MCNC: 40 MG/DL (ref 6–20)
BUN SERPL-MCNC: 41 MG/DL (ref 6–20)
BUN SERPL-MCNC: 42 MG/DL (ref 6–20)
BUN/CREAT SERPL: 23 (ref 12–20)
BUN/CREAT SERPL: 24 (ref 12–20)
BUN/CREAT SERPL: 24 (ref 12–20)
BUN/CREAT SERPL: 26 (ref 12–20)
BUN/CREAT SERPL: 26 (ref 12–20)
BUN/CREAT SERPL: 27 (ref 12–20)
BUN/CREAT SERPL: 28 (ref 12–20)
BUN/CREAT SERPL: 29 (ref 12–20)
BUN/CREAT SERPL: 30 (ref 12–20)
CALCIUM SERPL-MCNC: 7.1 MG/DL (ref 8.5–10.1)
CALCIUM SERPL-MCNC: 7.3 MG/DL (ref 8.5–10.1)
CALCIUM SERPL-MCNC: 7.4 MG/DL (ref 8.5–10.1)
CALCIUM SERPL-MCNC: 7.5 MG/DL (ref 8.5–10.1)
CALCIUM SERPL-MCNC: 7.5 MG/DL (ref 8.5–10.1)
CALCIUM SERPL-MCNC: 8.3 MG/DL (ref 8.5–10.1)
CALCIUM SERPL-MCNC: 8.3 MG/DL (ref 8.5–10.1)
CALCIUM SERPL-MCNC: 8.7 MG/DL (ref 8.5–10.1)
CALCIUM SERPL-MCNC: 9.7 MG/DL (ref 8.5–10.1)
CALCULATED P AXIS, ECG09: 59 DEGREES
CALCULATED P AXIS, ECG09: 63 DEGREES
CALCULATED P AXIS, ECG09: 64 DEGREES
CALCULATED P AXIS, ECG09: 81 DEGREES
CALCULATED R AXIS, ECG10: 14 DEGREES
CALCULATED R AXIS, ECG10: 29 DEGREES
CALCULATED R AXIS, ECG10: 32 DEGREES
CALCULATED R AXIS, ECG10: 33 DEGREES
CALCULATED T AXIS, ECG11: 109 DEGREES
CALCULATED T AXIS, ECG11: 134 DEGREES
CALCULATED T AXIS, ECG11: 176 DEGREES
CALCULATED T AXIS, ECG11: 88 DEGREES
CC UR VC: ABNORMAL
CHLORIDE SERPL-SCNC: 100 MMOL/L (ref 97–108)
CHLORIDE SERPL-SCNC: 101 MMOL/L (ref 97–108)
CHLORIDE SERPL-SCNC: 104 MMOL/L (ref 97–108)
CHLORIDE SERPL-SCNC: 104 MMOL/L (ref 97–108)
CHLORIDE SERPL-SCNC: 105 MMOL/L (ref 97–108)
CHLORIDE SERPL-SCNC: 106 MMOL/L (ref 97–108)
CHLORIDE SERPL-SCNC: 107 MMOL/L (ref 97–108)
CK MB CFR SERPL CALC: 5.3 % (ref 0–2.5)
CK MB SERPL-MCNC: 4.2 NG/ML (ref 5–25)
CK SERPL-CCNC: 79 U/L (ref 26–192)
CO2 SERPL-SCNC: 22 MMOL/L (ref 21–32)
CO2 SERPL-SCNC: 22 MMOL/L (ref 21–32)
CO2 SERPL-SCNC: 23 MMOL/L (ref 21–32)
CO2 SERPL-SCNC: 24 MMOL/L (ref 21–32)
CO2 SERPL-SCNC: 25 MMOL/L (ref 21–32)
CO2 SERPL-SCNC: 25 MMOL/L (ref 21–32)
CO2 SERPL-SCNC: 27 MMOL/L (ref 21–32)
COLOR UR: ABNORMAL
COLOR UR: ABNORMAL
COMMENT, HOLDF: NORMAL
COMMENT, HOLDF: NORMAL
CREAT SERPL-MCNC: 1.18 MG/DL (ref 0.55–1.02)
CREAT SERPL-MCNC: 1.32 MG/DL (ref 0.55–1.02)
CREAT SERPL-MCNC: 1.34 MG/DL (ref 0.55–1.02)
CREAT SERPL-MCNC: 1.36 MG/DL (ref 0.55–1.02)
CREAT SERPL-MCNC: 1.41 MG/DL (ref 0.55–1.02)
CREAT SERPL-MCNC: 1.44 MG/DL (ref 0.55–1.02)
CREAT SERPL-MCNC: 1.47 MG/DL (ref 0.55–1.02)
CREAT SERPL-MCNC: 1.48 MG/DL (ref 0.55–1.02)
CREAT SERPL-MCNC: 1.82 MG/DL (ref 0.55–1.02)
DIAGNOSIS, 93000: NORMAL
DIFFERENTIAL METHOD BLD: ABNORMAL
EOSINOPHIL # BLD: 0 K/UL (ref 0–0.4)
EOSINOPHIL # BLD: 0.1 K/UL (ref 0–0.4)
EOSINOPHIL # BLD: 0.2 K/UL (ref 0–0.4)
EOSINOPHIL # BLD: 0.3 K/UL (ref 0–0.4)
EOSINOPHIL NFR BLD: 0 % (ref 0–7)
EOSINOPHIL NFR BLD: 1 % (ref 0–7)
EOSINOPHIL NFR BLD: 3 % (ref 0–7)
EPITH CASTS URNS QL MICRO: ABNORMAL /LPF
EPITH CASTS URNS QL MICRO: ABNORMAL /LPF
ERYTHROCYTE [DISTWIDTH] IN BLOOD BY AUTOMATED COUNT: 15.5 % (ref 11.5–14.5)
ERYTHROCYTE [DISTWIDTH] IN BLOOD BY AUTOMATED COUNT: 18.6 % (ref 11.5–14.5)
ERYTHROCYTE [DISTWIDTH] IN BLOOD BY AUTOMATED COUNT: 18.7 % (ref 11.5–14.5)
ERYTHROCYTE [DISTWIDTH] IN BLOOD BY AUTOMATED COUNT: 18.8 % (ref 11.5–14.5)
ERYTHROCYTE [DISTWIDTH] IN BLOOD BY AUTOMATED COUNT: 18.9 % (ref 11.5–14.5)
EST. AVERAGE GLUCOSE BLD GHB EST-MCNC: 146 MG/DL
FERRITIN SERPL-MCNC: 619 NG/ML (ref 8–252)
FLUAV AG NPH QL IA: NEGATIVE
FLUBV AG NOSE QL IA: NEGATIVE
GAS FLOW.O2 O2 DELIVERY SYS: ABNORMAL L/MIN
GAS FLOW.O2 SETTING OXYMISER: 2 L/M
GLOBULIN SER CALC-MCNC: 3.6 G/DL (ref 2–4)
GLOBULIN SER CALC-MCNC: 3.7 G/DL (ref 2–4)
GLOBULIN SER CALC-MCNC: 4 G/DL (ref 2–4)
GLUCOSE BLD STRIP.AUTO-MCNC: 150 MG/DL (ref 65–100)
GLUCOSE BLD STRIP.AUTO-MCNC: 159 MG/DL (ref 65–100)
GLUCOSE BLD STRIP.AUTO-MCNC: 160 MG/DL (ref 65–100)
GLUCOSE BLD STRIP.AUTO-MCNC: 172 MG/DL (ref 65–100)
GLUCOSE BLD STRIP.AUTO-MCNC: 175 MG/DL (ref 65–100)
GLUCOSE BLD STRIP.AUTO-MCNC: 175 MG/DL (ref 65–100)
GLUCOSE BLD STRIP.AUTO-MCNC: 182 MG/DL (ref 65–100)
GLUCOSE BLD STRIP.AUTO-MCNC: 188 MG/DL (ref 65–100)
GLUCOSE BLD STRIP.AUTO-MCNC: 189 MG/DL (ref 65–100)
GLUCOSE BLD STRIP.AUTO-MCNC: 208 MG/DL (ref 65–100)
GLUCOSE BLD STRIP.AUTO-MCNC: 217 MG/DL (ref 65–100)
GLUCOSE BLD STRIP.AUTO-MCNC: 219 MG/DL (ref 65–100)
GLUCOSE BLD STRIP.AUTO-MCNC: 225 MG/DL (ref 65–100)
GLUCOSE BLD STRIP.AUTO-MCNC: 229 MG/DL (ref 65–100)
GLUCOSE BLD STRIP.AUTO-MCNC: 233 MG/DL (ref 65–100)
GLUCOSE BLD STRIP.AUTO-MCNC: 239 MG/DL (ref 65–100)
GLUCOSE BLD STRIP.AUTO-MCNC: 242 MG/DL (ref 65–100)
GLUCOSE BLD STRIP.AUTO-MCNC: 244 MG/DL (ref 65–100)
GLUCOSE BLD STRIP.AUTO-MCNC: 246 MG/DL (ref 65–100)
GLUCOSE BLD STRIP.AUTO-MCNC: 254 MG/DL (ref 65–100)
GLUCOSE BLD STRIP.AUTO-MCNC: 257 MG/DL (ref 65–100)
GLUCOSE BLD STRIP.AUTO-MCNC: 269 MG/DL (ref 65–100)
GLUCOSE BLD STRIP.AUTO-MCNC: 272 MG/DL (ref 65–100)
GLUCOSE BLD STRIP.AUTO-MCNC: 274 MG/DL (ref 65–100)
GLUCOSE BLD STRIP.AUTO-MCNC: 274 MG/DL (ref 65–100)
GLUCOSE BLD STRIP.AUTO-MCNC: 295 MG/DL (ref 65–100)
GLUCOSE BLD STRIP.AUTO-MCNC: 304 MG/DL (ref 65–100)
GLUCOSE SERPL-MCNC: 167 MG/DL (ref 65–100)
GLUCOSE SERPL-MCNC: 188 MG/DL (ref 65–100)
GLUCOSE SERPL-MCNC: 197 MG/DL (ref 65–100)
GLUCOSE SERPL-MCNC: 203 MG/DL (ref 65–100)
GLUCOSE SERPL-MCNC: 231 MG/DL (ref 65–100)
GLUCOSE SERPL-MCNC: 231 MG/DL (ref 65–100)
GLUCOSE SERPL-MCNC: 250 MG/DL (ref 65–100)
GLUCOSE SERPL-MCNC: 250 MG/DL (ref 65–100)
GLUCOSE SERPL-MCNC: 279 MG/DL (ref 65–100)
GLUCOSE UR STRIP.AUTO-MCNC: NEGATIVE MG/DL
GLUCOSE UR STRIP.AUTO-MCNC: NEGATIVE MG/DL
HBA1C MFR BLD: 6.7 % (ref 4–5.6)
HBA1C MFR BLD: 8.1 %
HCO3 BLD-SCNC: 20.7 MMOL/L (ref 22–26)
HCT VFR BLD AUTO: 25.5 % (ref 35–47)
HCT VFR BLD AUTO: 26 % (ref 35–47)
HCT VFR BLD AUTO: 26.3 % (ref 35–47)
HCT VFR BLD AUTO: 27.4 % (ref 35–47)
HCT VFR BLD AUTO: 27.7 % (ref 35–47)
HCT VFR BLD AUTO: 28.3 % (ref 35–47)
HCT VFR BLD AUTO: 29 % (ref 35–47)
HCT VFR BLD AUTO: 29.3 % (ref 35–47)
HCT VFR BLD AUTO: 34.7 % (ref 35–47)
HGB BLD-MCNC: 11.2 G/DL (ref 11.5–16)
HGB BLD-MCNC: 8.4 G/DL (ref 11.5–16)
HGB BLD-MCNC: 8.5 G/DL (ref 11.5–16)
HGB BLD-MCNC: 8.5 G/DL (ref 11.5–16)
HGB BLD-MCNC: 8.6 G/DL (ref 11.5–16)
HGB BLD-MCNC: 8.8 G/DL (ref 11.5–16)
HGB BLD-MCNC: 9 G/DL (ref 11.5–16)
HGB BLD-MCNC: 9.3 G/DL (ref 11.5–16)
HGB BLD-MCNC: 9.5 G/DL (ref 11.5–16)
HGB UR QL STRIP: ABNORMAL
HGB UR QL STRIP: ABNORMAL
HYALINE CASTS URNS QL MICRO: ABNORMAL /LPF (ref 0–5)
HYALINE CASTS URNS QL MICRO: ABNORMAL /LPF (ref 0–5)
IMM GRANULOCYTES # BLD AUTO: 0 K/UL
IMM GRANULOCYTES # BLD AUTO: 0.3 K/UL (ref 0–0.04)
IMM GRANULOCYTES # BLD AUTO: 0.4 K/UL (ref 0–0.04)
IMM GRANULOCYTES # BLD AUTO: 0.4 K/UL (ref 0–0.04)
IMM GRANULOCYTES # BLD AUTO: 1.7 K/UL (ref 0–0.04)
IMM GRANULOCYTES NFR BLD AUTO: 0 %
IMM GRANULOCYTES NFR BLD AUTO: 2 % (ref 0–0.5)
IMM GRANULOCYTES NFR BLD AUTO: 2 % (ref 0–0.5)
IMM GRANULOCYTES NFR BLD AUTO: 3 % (ref 0–0.5)
IMM GRANULOCYTES NFR BLD AUTO: 4 % (ref 0–0.5)
KETONES UR QL STRIP.AUTO: NEGATIVE MG/DL
KETONES UR QL STRIP.AUTO: NEGATIVE MG/DL
LACTATE BLD-SCNC: 1.22 MMOL/L (ref 0.4–2)
LEUKOCYTE ESTERASE UR QL STRIP.AUTO: ABNORMAL
LEUKOCYTE ESTERASE UR QL STRIP.AUTO: ABNORMAL
LYMPHOCYTES # BLD: 0.3 K/UL (ref 0.8–3.5)
LYMPHOCYTES # BLD: 0.4 K/UL (ref 0.8–3.5)
LYMPHOCYTES # BLD: 0.7 K/UL (ref 0.8–3.5)
LYMPHOCYTES # BLD: 0.8 K/UL (ref 0.8–3.5)
LYMPHOCYTES # BLD: 0.9 K/UL (ref 0.8–3.5)
LYMPHOCYTES # BLD: 0.9 K/UL (ref 0.8–3.5)
LYMPHOCYTES # BLD: 1.6 K/UL (ref 0.8–3.5)
LYMPHOCYTES NFR BLD: 1 % (ref 12–49)
LYMPHOCYTES NFR BLD: 1 % (ref 12–49)
LYMPHOCYTES NFR BLD: 3 % (ref 12–49)
LYMPHOCYTES NFR BLD: 39 % (ref 12–49)
LYMPHOCYTES NFR BLD: 4 % (ref 12–49)
LYMPHOCYTES NFR BLD: 5 % (ref 12–49)
LYMPHOCYTES NFR BLD: 6 % (ref 12–49)
MAGNESIUM SERPL-MCNC: 1.4 MG/DL (ref 1.6–2.4)
MAGNESIUM SERPL-MCNC: 1.5 MG/DL (ref 1.6–2.4)
MAGNESIUM SERPL-MCNC: 1.9 MG/DL (ref 1.6–2.4)
MAGNESIUM SERPL-MCNC: 2.1 MG/DL (ref 1.6–2.4)
MAGNESIUM SERPL-MCNC: 2.1 MG/DL (ref 1.6–2.4)
MAGNESIUM SERPL-MCNC: 2.2 MG/DL (ref 1.6–2.4)
MAGNESIUM SERPL-MCNC: 2.2 MG/DL (ref 1.6–2.4)
MCH RBC QN AUTO: 31.4 PG (ref 26–34)
MCH RBC QN AUTO: 31.5 PG (ref 26–34)
MCH RBC QN AUTO: 31.7 PG (ref 26–34)
MCH RBC QN AUTO: 31.8 PG (ref 26–34)
MCH RBC QN AUTO: 32 PG (ref 26–34)
MCH RBC QN AUTO: 32.2 PG (ref 26–34)
MCH RBC QN AUTO: 32.3 PG (ref 26–34)
MCH RBC QN AUTO: 32.5 PG (ref 26–34)
MCH RBC QN AUTO: 33.8 PG (ref 26–34)
MCHC RBC AUTO-ENTMCNC: 31.1 G/DL (ref 30–36.5)
MCHC RBC AUTO-ENTMCNC: 31.4 G/DL (ref 30–36.5)
MCHC RBC AUTO-ENTMCNC: 32.1 G/DL (ref 30–36.5)
MCHC RBC AUTO-ENTMCNC: 32.3 G/DL (ref 30–36.5)
MCHC RBC AUTO-ENTMCNC: 32.3 G/DL (ref 30–36.5)
MCHC RBC AUTO-ENTMCNC: 32.4 G/DL (ref 30–36.5)
MCHC RBC AUTO-ENTMCNC: 32.5 G/DL (ref 30–36.5)
MCHC RBC AUTO-ENTMCNC: 32.7 G/DL (ref 30–36.5)
MCHC RBC AUTO-ENTMCNC: 32.9 G/DL (ref 30–36.5)
MCV RBC AUTO: 100 FL (ref 80–99)
MCV RBC AUTO: 100.3 FL (ref 80–99)
MCV RBC AUTO: 101.1 FL (ref 80–99)
MCV RBC AUTO: 101.9 FL (ref 80–99)
MCV RBC AUTO: 104.8 FL (ref 80–99)
MCV RBC AUTO: 96.3 FL (ref 80–99)
MCV RBC AUTO: 96.6 FL (ref 80–99)
MCV RBC AUTO: 98.1 FL (ref 80–99)
MCV RBC AUTO: 99.7 FL (ref 80–99)
MONOCYTES # BLD: 0.2 K/UL (ref 0–1)
MONOCYTES # BLD: 0.5 K/UL (ref 0–1)
MONOCYTES # BLD: 1.1 K/UL (ref 0–1)
MONOCYTES # BLD: 1.2 K/UL (ref 0–1)
MONOCYTES # BLD: 1.3 K/UL (ref 0–1)
MONOCYTES # BLD: 1.5 K/UL (ref 0–1)
MONOCYTES # BLD: 1.8 K/UL (ref 0–1)
MONOCYTES # BLD: 2.3 K/UL (ref 0–1)
MONOCYTES # BLD: 2.6 K/UL (ref 0–1)
MONOCYTES NFR BLD: 10 % (ref 5–13)
MONOCYTES NFR BLD: 3 % (ref 5–13)
MONOCYTES NFR BLD: 4 % (ref 5–13)
MONOCYTES NFR BLD: 6 % (ref 5–13)
MONOCYTES NFR BLD: 7 % (ref 5–13)
MONOCYTES NFR BLD: 7 % (ref 5–13)
MONOCYTES NFR BLD: 8 % (ref 5–13)
MONOCYTES NFR BLD: 8 % (ref 5–13)
MONOCYTES NFR BLD: 9 % (ref 5–13)
MYELOCYTES NFR BLD MANUAL: 1 %
NEUTS BAND NFR BLD MANUAL: 1 % (ref 0–6)
NEUTS BAND NFR BLD MANUAL: 1 % (ref 0–6)
NEUTS BAND NFR BLD MANUAL: 2 % (ref 0–6)
NEUTS SEG # BLD: 1 K/UL (ref 1.8–8)
NEUTS SEG # BLD: 12 K/UL (ref 1.8–8)
NEUTS SEG # BLD: 12.5 K/UL (ref 1.8–8)
NEUTS SEG # BLD: 16 K/UL (ref 1.8–8)
NEUTS SEG # BLD: 16.9 K/UL (ref 1.8–8)
NEUTS SEG # BLD: 23 K/UL (ref 1.8–8)
NEUTS SEG # BLD: 25.9 K/UL (ref 1.8–8)
NEUTS SEG # BLD: 30 K/UL (ref 1.8–8)
NEUTS SEG # BLD: 37.9 K/UL (ref 1.8–8)
NEUTS SEG NFR BLD: 48 % (ref 32–75)
NEUTS SEG NFR BLD: 82 % (ref 32–75)
NEUTS SEG NFR BLD: 83 % (ref 32–75)
NEUTS SEG NFR BLD: 84 % (ref 32–75)
NEUTS SEG NFR BLD: 86 % (ref 32–75)
NEUTS SEG NFR BLD: 89 % (ref 32–75)
NEUTS SEG NFR BLD: 90 % (ref 32–75)
NEUTS SEG NFR BLD: 91 % (ref 32–75)
NEUTS SEG NFR BLD: 91 % (ref 32–75)
NITRITE UR QL STRIP.AUTO: POSITIVE
NITRITE UR QL STRIP.AUTO: POSITIVE
NRBC # BLD: 0 K/UL (ref 0–0.01)
NRBC # BLD: 0.03 K/UL (ref 0–0.01)
NRBC # BLD: 0.04 K/UL (ref 0–0.01)
NRBC # BLD: 0.04 K/UL (ref 0–0.01)
NRBC # BLD: 0.06 K/UL (ref 0–0.01)
NRBC # BLD: 0.06 K/UL (ref 0–0.01)
NRBC # BLD: 0.07 K/UL (ref 0–0.01)
NRBC # BLD: 0.08 K/UL (ref 0–0.01)
NRBC # BLD: 0.12 K/UL (ref 0–0.01)
NRBC BLD-RTO: 0 PER 100 WBC
NRBC BLD-RTO: 0.2 PER 100 WBC
NRBC BLD-RTO: 0.3 PER 100 WBC
NRBC BLD-RTO: 0.3 PER 100 WBC
NRBC BLD-RTO: 0.5 PER 100 WBC
P-R INTERVAL, ECG05: 140 MS
P-R INTERVAL, ECG05: 150 MS
P-R INTERVAL, ECG05: 166 MS
P-R INTERVAL, ECG05: 184 MS
PATH REV BLD -IMP: ABNORMAL
PCO2 BLD: 47.2 MMHG (ref 35–45)
PH BLD: 7.25 [PH] (ref 7.35–7.45)
PH UR STRIP: 5.5 [PH] (ref 5–8)
PH UR STRIP: 6 [PH] (ref 5–8)
PHOSPHATE SERPL-MCNC: 1.9 MG/DL (ref 2.6–4.7)
PHOSPHATE SERPL-MCNC: 1.9 MG/DL (ref 2.6–4.7)
PHOSPHATE SERPL-MCNC: 2 MG/DL (ref 2.6–4.7)
PHOSPHATE SERPL-MCNC: 2.1 MG/DL (ref 2.6–4.7)
PHOSPHATE SERPL-MCNC: 2.1 MG/DL (ref 2.6–4.7)
PHOSPHATE SERPL-MCNC: 3.2 MG/DL (ref 2.6–4.7)
PHOSPHATE SERPL-MCNC: 3.4 MG/DL (ref 2.6–4.7)
PLATELET # BLD AUTO: 102 K/UL (ref 150–400)
PLATELET # BLD AUTO: 116 K/UL (ref 150–400)
PLATELET # BLD AUTO: 126 K/UL (ref 150–400)
PLATELET # BLD AUTO: 148 K/UL (ref 150–400)
PLATELET # BLD AUTO: 157 K/UL (ref 150–400)
PLATELET # BLD AUTO: 166 K/UL (ref 150–400)
PLATELET # BLD AUTO: 185 K/UL (ref 150–400)
PLATELET # BLD AUTO: 87 K/UL (ref 150–400)
PLATELET # BLD AUTO: 97 K/UL (ref 150–400)
PMV BLD AUTO: 10.3 FL (ref 8.9–12.9)
PMV BLD AUTO: 10.8 FL (ref 8.9–12.9)
PMV BLD AUTO: 10.9 FL (ref 8.9–12.9)
PMV BLD AUTO: 11 FL (ref 8.9–12.9)
PMV BLD AUTO: 11.4 FL (ref 8.9–12.9)
PMV BLD AUTO: 11.6 FL (ref 8.9–12.9)
PMV BLD AUTO: 11.8 FL (ref 8.9–12.9)
PO2 BLD: 73 MMHG (ref 80–100)
POTASSIUM SERPL-SCNC: 3.4 MMOL/L (ref 3.5–5.1)
POTASSIUM SERPL-SCNC: 3.7 MMOL/L (ref 3.5–5.1)
POTASSIUM SERPL-SCNC: 3.8 MMOL/L (ref 3.5–5.1)
POTASSIUM SERPL-SCNC: 3.8 MMOL/L (ref 3.5–5.1)
POTASSIUM SERPL-SCNC: 3.9 MMOL/L (ref 3.5–5.1)
POTASSIUM SERPL-SCNC: 4.1 MMOL/L (ref 3.5–5.1)
POTASSIUM SERPL-SCNC: 4.4 MMOL/L (ref 3.5–5.1)
PROT SERPL-MCNC: 6.4 G/DL (ref 6.4–8.2)
PROT SERPL-MCNC: 6.8 G/DL (ref 6.4–8.2)
PROT SERPL-MCNC: 7.4 G/DL (ref 6.4–8.2)
PROT UR STRIP-MCNC: 100 MG/DL
PROT UR STRIP-MCNC: ABNORMAL MG/DL
Q-T INTERVAL, ECG07: 310 MS
Q-T INTERVAL, ECG07: 344 MS
Q-T INTERVAL, ECG07: 354 MS
Q-T INTERVAL, ECG07: 396 MS
QRS DURATION, ECG06: 80 MS
QRS DURATION, ECG06: 82 MS
QRS DURATION, ECG06: 84 MS
QRS DURATION, ECG06: 90 MS
QTC CALCULATION (BEZET), ECG08: 439 MS
QTC CALCULATION (BEZET), ECG08: 441 MS
QTC CALCULATION (BEZET), ECG08: 448 MS
QTC CALCULATION (BEZET), ECG08: 449 MS
RBC # BLD AUTO: 2.64 M/UL (ref 3.8–5.2)
RBC # BLD AUTO: 2.68 M/UL (ref 3.8–5.2)
RBC # BLD AUTO: 2.69 M/UL (ref 3.8–5.2)
RBC # BLD AUTO: 2.7 M/UL (ref 3.8–5.2)
RBC # BLD AUTO: 2.77 M/UL (ref 3.8–5.2)
RBC # BLD AUTO: 2.8 M/UL (ref 3.8–5.2)
RBC # BLD AUTO: 2.89 M/UL (ref 3.8–5.2)
RBC # BLD AUTO: 2.94 M/UL (ref 3.8–5.2)
RBC # BLD AUTO: 3.31 M/UL (ref 3.8–5.2)
RBC #/AREA URNS HPF: ABNORMAL /HPF (ref 0–5)
RBC #/AREA URNS HPF: ABNORMAL /HPF (ref 0–5)
RBC MORPH BLD: ABNORMAL
SAMPLES BEING HELD,HOLD: NORMAL
SAMPLES BEING HELD,HOLD: NORMAL
SAO2 % BLD: 92 % (ref 92–97)
SERVICE CMNT-IMP: ABNORMAL
SODIUM SERPL-SCNC: 134 MMOL/L (ref 136–145)
SODIUM SERPL-SCNC: 135 MMOL/L (ref 136–145)
SODIUM SERPL-SCNC: 135 MMOL/L (ref 136–145)
SODIUM SERPL-SCNC: 136 MMOL/L (ref 136–145)
SODIUM SERPL-SCNC: 137 MMOL/L (ref 136–145)
SODIUM SERPL-SCNC: 137 MMOL/L (ref 136–145)
SODIUM SERPL-SCNC: 138 MMOL/L (ref 136–145)
SP GR UR REFRACTOMETRY: 1.01 (ref 1–1.03)
SP GR UR REFRACTOMETRY: 1.01 (ref 1–1.03)
SPECIMEN TYPE: ABNORMAL
THERAPEUTIC RANGE,PTTT: ABNORMAL SECS (ref 58–77)
TOTAL RESP. RATE, ITRR: 28
TROPONIN I SERPL-MCNC: 0.06 NG/ML
TROPONIN I SERPL-MCNC: 0.91 NG/ML
TROPONIN I SERPL-MCNC: 10.6 NG/ML
TROPONIN I SERPL-MCNC: 10.9 NG/ML
TROPONIN I SERPL-MCNC: 15.8 NG/ML
TROPONIN I SERPL-MCNC: 21.1 NG/ML
TROPONIN I SERPL-MCNC: 8.1 NG/ML
TROPONIN I SERPL-MCNC: <0.05 NG/ML
TROPONIN I SERPL-MCNC: <0.05 NG/ML
UA: UC IF INDICATED,UAUC: ABNORMAL
UR CULT HOLD, URHOLD: NORMAL
UR CULT HOLD, URHOLD: NORMAL
UROBILINOGEN UR QL STRIP.AUTO: 0.2 EU/DL (ref 0.2–1)
UROBILINOGEN UR QL STRIP.AUTO: 0.2 EU/DL (ref 0.2–1)
VENTRICULAR RATE, ECG03: 102 BPM
VENTRICULAR RATE, ECG03: 122 BPM
VENTRICULAR RATE, ECG03: 74 BPM
VENTRICULAR RATE, ECG03: 97 BPM
WBC # BLD AUTO: 14.5 K/UL (ref 3.6–11)
WBC # BLD AUTO: 15 K/UL (ref 3.6–11)
WBC # BLD AUTO: 18.3 K/UL (ref 3.6–11)
WBC # BLD AUTO: 19.1 K/UL (ref 3.6–11)
WBC # BLD AUTO: 2.2 K/UL (ref 3.6–11)
WBC # BLD AUTO: 26.4 K/UL (ref 3.6–11)
WBC # BLD AUTO: 28.1 K/UL (ref 3.6–11)
WBC # BLD AUTO: 33 K/UL (ref 3.6–11)
WBC # BLD AUTO: 42.6 K/UL (ref 3.6–11)
WBC MORPH BLD: ABNORMAL
WBC URNS QL MICRO: >100 /HPF (ref 0–4)
WBC URNS QL MICRO: ABNORMAL /HPF (ref 0–4)

## 2019-01-01 PROCEDURE — 74011250637 HC RX REV CODE- 250/637: Performed by: NURSE PRACTITIONER

## 2019-01-01 PROCEDURE — 74011636637 HC RX REV CODE- 636/637: Performed by: NURSE PRACTITIONER

## 2019-01-01 PROCEDURE — 36600 WITHDRAWAL OF ARTERIAL BLOOD: CPT

## 2019-01-01 PROCEDURE — 80048 BASIC METABOLIC PNL TOTAL CA: CPT

## 2019-01-01 PROCEDURE — 96366 THER/PROPH/DIAG IV INF ADDON: CPT

## 2019-01-01 PROCEDURE — 77030038269 HC DRN EXT URIN PURWCK BARD -A

## 2019-01-01 PROCEDURE — 74011636637 HC RX REV CODE- 636/637: Performed by: INTERNAL MEDICINE

## 2019-01-01 PROCEDURE — 97161 PT EVAL LOW COMPLEX 20 MIN: CPT

## 2019-01-01 PROCEDURE — 83735 ASSAY OF MAGNESIUM: CPT

## 2019-01-01 PROCEDURE — 71046 X-RAY EXAM CHEST 2 VIEWS: CPT

## 2019-01-01 PROCEDURE — 97530 THERAPEUTIC ACTIVITIES: CPT

## 2019-01-01 PROCEDURE — 93306 TTE W/DOPPLER COMPLETE: CPT

## 2019-01-01 PROCEDURE — 74011250636 HC RX REV CODE- 250/636: Performed by: INTERNAL MEDICINE

## 2019-01-01 PROCEDURE — 83605 ASSAY OF LACTIC ACID: CPT

## 2019-01-01 PROCEDURE — 85025 COMPLETE CBC W/AUTO DIFF WBC: CPT

## 2019-01-01 PROCEDURE — 36415 COLL VENOUS BLD VENIPUNCTURE: CPT

## 2019-01-01 PROCEDURE — 81001 URINALYSIS AUTO W/SCOPE: CPT

## 2019-01-01 PROCEDURE — 84100 ASSAY OF PHOSPHORUS: CPT

## 2019-01-01 PROCEDURE — 93005 ELECTROCARDIOGRAM TRACING: CPT

## 2019-01-01 PROCEDURE — 65210000002 HC RM PRIVATE GYN

## 2019-01-01 PROCEDURE — 74011250636 HC RX REV CODE- 250/636: Performed by: HOSPITALIST

## 2019-01-01 PROCEDURE — 74011250637 HC RX REV CODE- 250/637: Performed by: EMERGENCY MEDICINE

## 2019-01-01 PROCEDURE — 74011250636 HC RX REV CODE- 250/636: Performed by: NURSE PRACTITIONER

## 2019-01-01 PROCEDURE — 74011250637 HC RX REV CODE- 250/637: Performed by: INTERNAL MEDICINE

## 2019-01-01 PROCEDURE — 65660000000 HC RM CCU STEPDOWN

## 2019-01-01 PROCEDURE — 74011250636 HC RX REV CODE- 250/636: Performed by: EMERGENCY MEDICINE

## 2019-01-01 PROCEDURE — 77010033711 HC HIGH FLOW OXYGEN

## 2019-01-01 PROCEDURE — 97535 SELF CARE MNGMENT TRAINING: CPT

## 2019-01-01 PROCEDURE — 82962 GLUCOSE BLOOD TEST: CPT

## 2019-01-01 PROCEDURE — 74011000250 HC RX REV CODE- 250: Performed by: EMERGENCY MEDICINE

## 2019-01-01 PROCEDURE — 97116 GAIT TRAINING THERAPY: CPT

## 2019-01-01 PROCEDURE — 82803 BLOOD GASES ANY COMBINATION: CPT

## 2019-01-01 PROCEDURE — 74011250637 HC RX REV CODE- 250/637: Performed by: SPECIALIST

## 2019-01-01 PROCEDURE — 87077 CULTURE AEROBIC IDENTIFY: CPT

## 2019-01-01 PROCEDURE — 97162 PT EVAL MOD COMPLEX 30 MIN: CPT | Performed by: PHYSICAL THERAPIST

## 2019-01-01 PROCEDURE — 71045 X-RAY EXAM CHEST 1 VIEW: CPT

## 2019-01-01 PROCEDURE — 87040 BLOOD CULTURE FOR BACTERIA: CPT

## 2019-01-01 PROCEDURE — 70450 CT HEAD/BRAIN W/O DYE: CPT

## 2019-01-01 PROCEDURE — 77030003560 HC NDL HUBR BARD -A

## 2019-01-01 PROCEDURE — 96365 THER/PROPH/DIAG IV INF INIT: CPT

## 2019-01-01 PROCEDURE — 87186 SC STD MICRODIL/AGAR DIL: CPT

## 2019-01-01 PROCEDURE — 96374 THER/PROPH/DIAG INJ IV PUSH: CPT

## 2019-01-01 PROCEDURE — 65610000006 HC RM INTENSIVE CARE

## 2019-01-01 PROCEDURE — 74011000258 HC RX REV CODE- 258: Performed by: NURSE PRACTITIONER

## 2019-01-01 PROCEDURE — 70551 MRI BRAIN STEM W/O DYE: CPT

## 2019-01-01 PROCEDURE — 99285 EMERGENCY DEPT VISIT HI MDM: CPT

## 2019-01-01 PROCEDURE — 84484 ASSAY OF TROPONIN QUANT: CPT

## 2019-01-01 PROCEDURE — 83036 HEMOGLOBIN GLYCOSYLATED A1C: CPT

## 2019-01-01 PROCEDURE — 80053 COMPREHEN METABOLIC PANEL: CPT

## 2019-01-01 PROCEDURE — 82550 ASSAY OF CK (CPK): CPT

## 2019-01-01 PROCEDURE — 96367 TX/PROPH/DG ADDL SEQ IV INF: CPT

## 2019-01-01 PROCEDURE — 51701 INSERT BLADDER CATHETER: CPT

## 2019-01-01 PROCEDURE — 97112 NEUROMUSCULAR REEDUCATION: CPT | Performed by: PHYSICAL THERAPIST

## 2019-01-01 PROCEDURE — 74011000258 HC RX REV CODE- 258: Performed by: INTERNAL MEDICINE

## 2019-01-01 PROCEDURE — 87804 INFLUENZA ASSAY W/OPTIC: CPT

## 2019-01-01 PROCEDURE — 97165 OT EVAL LOW COMPLEX 30 MIN: CPT

## 2019-01-01 PROCEDURE — 87086 URINE CULTURE/COLONY COUNT: CPT

## 2019-01-01 PROCEDURE — 93970 EXTREMITY STUDY: CPT

## 2019-01-01 PROCEDURE — 96375 TX/PRO/DX INJ NEW DRUG ADDON: CPT

## 2019-01-01 PROCEDURE — P9045 ALBUMIN (HUMAN), 5%, 250 ML: HCPCS | Performed by: NURSE PRACTITIONER

## 2019-01-01 PROCEDURE — 85730 THROMBOPLASTIN TIME PARTIAL: CPT

## 2019-01-01 PROCEDURE — P9612 CATHETERIZE FOR URINE SPEC: HCPCS

## 2019-01-01 PROCEDURE — 74011000258 HC RX REV CODE- 258: Performed by: EMERGENCY MEDICINE

## 2019-01-01 PROCEDURE — 96368 THER/DIAG CONCURRENT INF: CPT

## 2019-01-01 PROCEDURE — 82728 ASSAY OF FERRITIN: CPT

## 2019-01-01 RX ORDER — CARVEDILOL 3.12 MG/1
3.12 TABLET ORAL 2 TIMES DAILY WITH MEALS
Status: DISCONTINUED | OUTPATIENT
Start: 2019-01-01 | End: 2019-01-01

## 2019-01-01 RX ORDER — NOREPINEPHRINE BITARTRATE/D5W 8 MG/250ML
.5-16 PLASTIC BAG, INJECTION (ML) INTRAVENOUS
Status: DISCONTINUED | OUTPATIENT
Start: 2019-01-01 | End: 2019-01-01

## 2019-01-01 RX ORDER — CLOPIDOGREL BISULFATE 75 MG/1
75 TABLET ORAL DAILY
Status: DISCONTINUED | OUTPATIENT
Start: 2019-01-01 | End: 2020-01-01 | Stop reason: HOSPADM

## 2019-01-01 RX ORDER — CALCIUM CARBONATE 200(500)MG
200 TABLET,CHEWABLE ORAL ONCE
Status: COMPLETED | OUTPATIENT
Start: 2019-01-01 | End: 2019-01-01

## 2019-01-01 RX ORDER — LANOLIN ALCOHOL/MO/W.PET/CERES
800 CREAM (GRAM) TOPICAL ONCE
Status: COMPLETED | OUTPATIENT
Start: 2019-01-01 | End: 2019-01-01

## 2019-01-01 RX ORDER — SODIUM CHLORIDE 0.9 % (FLUSH) 0.9 %
5-40 SYRINGE (ML) INJECTION EVERY 8 HOURS
Status: DISCONTINUED | OUTPATIENT
Start: 2019-01-01 | End: 2020-01-01 | Stop reason: HOSPADM

## 2019-01-01 RX ORDER — CAPECITABINE 500 MG/1
TABLET, FILM COATED ORAL
COMMUNITY
End: 2020-01-01

## 2019-01-01 RX ORDER — CARVEDILOL 3.12 MG/1
3.12 TABLET ORAL 2 TIMES DAILY WITH MEALS
Qty: 60 TAB | Refills: 0 | Status: SHIPPED | OUTPATIENT
Start: 2019-01-01 | End: 2020-01-01

## 2019-01-01 RX ORDER — NITROGLYCERIN 0.4 MG/1
0.4 TABLET SUBLINGUAL AS NEEDED
Status: DISCONTINUED | OUTPATIENT
Start: 2019-01-01 | End: 2019-01-01 | Stop reason: HOSPADM

## 2019-01-01 RX ORDER — ONDANSETRON 2 MG/ML
8 INJECTION INTRAMUSCULAR; INTRAVENOUS
Status: COMPLETED | OUTPATIENT
Start: 2019-01-01 | End: 2019-01-01

## 2019-01-01 RX ORDER — MAGNESIUM SULFATE 100 %
4 CRYSTALS MISCELLANEOUS AS NEEDED
Status: DISCONTINUED | OUTPATIENT
Start: 2019-01-01 | End: 2019-01-01 | Stop reason: HOSPADM

## 2019-01-01 RX ORDER — CARVEDILOL 12.5 MG/1
TABLET ORAL
Qty: 60 TAB | Refills: 4 | Status: SHIPPED | OUTPATIENT
Start: 2019-01-01 | End: 2020-01-01

## 2019-01-01 RX ORDER — MAGNESIUM SULFATE HEPTAHYDRATE 40 MG/ML
2 INJECTION, SOLUTION INTRAVENOUS ONCE
Status: COMPLETED | OUTPATIENT
Start: 2019-01-01 | End: 2019-01-01

## 2019-01-01 RX ORDER — SODIUM CHLORIDE 0.9 % (FLUSH) 0.9 %
5-40 SYRINGE (ML) INJECTION AS NEEDED
Status: DISCONTINUED | OUTPATIENT
Start: 2019-01-01 | End: 2020-01-01 | Stop reason: HOSPADM

## 2019-01-01 RX ORDER — CARVEDILOL 12.5 MG/1
12.5 TABLET ORAL 2 TIMES DAILY
Status: DISCONTINUED | OUTPATIENT
Start: 2019-01-01 | End: 2019-01-01 | Stop reason: HOSPADM

## 2019-01-01 RX ORDER — HEPARIN SODIUM 5000 [USP'U]/ML
5000 INJECTION, SOLUTION INTRAVENOUS; SUBCUTANEOUS EVERY 8 HOURS
Status: DISCONTINUED | OUTPATIENT
Start: 2019-01-01 | End: 2019-01-01 | Stop reason: HOSPADM

## 2019-01-01 RX ORDER — GLYCERIN ADULT
1 SUPPOSITORY, RECTAL RECTAL
Status: DISCONTINUED | OUTPATIENT
Start: 2019-01-01 | End: 2020-01-01 | Stop reason: HOSPADM

## 2019-01-01 RX ORDER — ONDANSETRON 2 MG/ML
4 INJECTION INTRAMUSCULAR; INTRAVENOUS
Status: COMPLETED | OUTPATIENT
Start: 2019-01-01 | End: 2019-01-01

## 2019-01-01 RX ORDER — ASPIRIN 81 MG/1
81 TABLET ORAL DAILY
Status: DISCONTINUED | OUTPATIENT
Start: 2019-01-01 | End: 2019-01-01 | Stop reason: HOSPADM

## 2019-01-01 RX ORDER — BACITRACIN 500 UNIT/G
1 PACKET (EA) TOPICAL AS NEEDED
Status: DISCONTINUED | OUTPATIENT
Start: 2019-01-01 | End: 2020-01-01 | Stop reason: HOSPADM

## 2019-01-01 RX ORDER — INSULIN LISPRO 100 [IU]/ML
2-3 INJECTION, SOLUTION INTRAVENOUS; SUBCUTANEOUS
Status: DISCONTINUED | OUTPATIENT
Start: 2019-01-01 | End: 2019-01-01

## 2019-01-01 RX ORDER — CLOPIDOGREL BISULFATE 75 MG/1
75 TABLET ORAL DAILY
Status: DISCONTINUED | OUTPATIENT
Start: 2019-01-01 | End: 2019-01-01 | Stop reason: HOSPADM

## 2019-01-01 RX ORDER — HEPARIN 100 UNIT/ML
500 SYRINGE INTRAVENOUS AS NEEDED
Status: DISCONTINUED | OUTPATIENT
Start: 2019-01-01 | End: 2020-01-01 | Stop reason: HOSPADM

## 2019-01-01 RX ORDER — DEXTROSE MONOHYDRATE 100 MG/ML
125-250 INJECTION, SOLUTION INTRAVENOUS AS NEEDED
Status: DISCONTINUED | OUTPATIENT
Start: 2019-01-01 | End: 2019-01-01 | Stop reason: HOSPADM

## 2019-01-01 RX ORDER — CARVEDILOL 6.25 MG/1
6.25 TABLET ORAL 2 TIMES DAILY WITH MEALS
Status: DISCONTINUED | OUTPATIENT
Start: 2019-01-01 | End: 2020-01-01 | Stop reason: HOSPADM

## 2019-01-01 RX ORDER — SODIUM CHLORIDE 0.9 % (FLUSH) 0.9 %
5-40 SYRINGE (ML) INJECTION EVERY 8 HOURS
Status: DISCONTINUED | OUTPATIENT
Start: 2019-01-01 | End: 2019-01-01 | Stop reason: HOSPADM

## 2019-01-01 RX ORDER — SODIUM CHLORIDE 9 MG/ML
75 INJECTION, SOLUTION INTRAVENOUS CONTINUOUS
Status: DISPENSED | OUTPATIENT
Start: 2019-01-01 | End: 2019-01-01

## 2019-01-01 RX ORDER — ANASTROZOLE 1 MG/1
1 TABLET ORAL DAILY
Status: DISCONTINUED | OUTPATIENT
Start: 2019-01-01 | End: 2019-01-01 | Stop reason: HOSPADM

## 2019-01-01 RX ORDER — POLYETHYLENE GLYCOL 3350 17 G/17G
17 POWDER, FOR SOLUTION ORAL DAILY
Status: DISCONTINUED | OUTPATIENT
Start: 2019-01-01 | End: 2020-01-01

## 2019-01-01 RX ORDER — SODIUM CHLORIDE 0.9 % (FLUSH) 0.9 %
5-40 SYRINGE (ML) INJECTION AS NEEDED
Status: DISCONTINUED | OUTPATIENT
Start: 2019-01-01 | End: 2019-01-01 | Stop reason: HOSPADM

## 2019-01-01 RX ORDER — FUROSEMIDE 10 MG/ML
80 INJECTION INTRAMUSCULAR; INTRAVENOUS ONCE
Status: COMPLETED | OUTPATIENT
Start: 2019-01-01 | End: 2019-01-01

## 2019-01-01 RX ORDER — LEVOTHYROXINE SODIUM 88 UG/1
88 TABLET ORAL
Status: DISCONTINUED | OUTPATIENT
Start: 2019-01-01 | End: 2020-01-01 | Stop reason: HOSPADM

## 2019-01-01 RX ORDER — ALBUMIN HUMAN 50 G/1000ML
25 SOLUTION INTRAVENOUS ONCE
Status: COMPLETED | OUTPATIENT
Start: 2019-01-01 | End: 2019-01-01

## 2019-01-01 RX ORDER — SENNOSIDES 8.6 MG/1
2 TABLET ORAL DAILY
Status: DISCONTINUED | OUTPATIENT
Start: 2019-01-01 | End: 2020-01-01 | Stop reason: HOSPADM

## 2019-01-01 RX ORDER — GUAIFENESIN 100 MG/5ML
81 LIQUID (ML) ORAL DAILY
Status: DISCONTINUED | OUTPATIENT
Start: 2019-01-01 | End: 2020-01-01 | Stop reason: HOSPADM

## 2019-01-01 RX ORDER — ACETAMINOPHEN 500 MG
1000 TABLET ORAL
Status: DISCONTINUED | OUTPATIENT
Start: 2019-01-01 | End: 2020-01-01 | Stop reason: HOSPADM

## 2019-01-01 RX ORDER — INSULIN GLARGINE 100 [IU]/ML
8 INJECTION, SOLUTION SUBCUTANEOUS
Status: DISCONTINUED | OUTPATIENT
Start: 2019-01-01 | End: 2020-01-01

## 2019-01-01 RX ORDER — MECLIZINE HCL 12.5 MG 12.5 MG/1
25 TABLET ORAL
Status: COMPLETED | OUTPATIENT
Start: 2019-01-01 | End: 2019-01-01

## 2019-01-01 RX ORDER — PACLITAXEL 100 MG/20ML
INJECTION, POWDER, LYOPHILIZED, FOR SUSPENSION INTRAVENOUS
COMMUNITY
End: 2020-01-01

## 2019-01-01 RX ORDER — OXYCODONE HYDROCHLORIDE 5 MG/1
2.5 TABLET ORAL
Status: DISCONTINUED | OUTPATIENT
Start: 2019-01-01 | End: 2020-01-01 | Stop reason: HOSPADM

## 2019-01-01 RX ORDER — ACETAMINOPHEN 325 MG/1
650 TABLET ORAL
Status: DISCONTINUED | OUTPATIENT
Start: 2019-01-01 | End: 2019-01-01 | Stop reason: HOSPADM

## 2019-01-01 RX ORDER — CEFUROXIME AXETIL 500 MG/1
500 TABLET ORAL 2 TIMES DAILY
Qty: 10 TAB | Refills: 0 | Status: SHIPPED | OUTPATIENT
Start: 2019-01-01 | End: 2019-01-01

## 2019-01-01 RX ORDER — MAGNESIUM SULFATE 100 %
4 CRYSTALS MISCELLANEOUS AS NEEDED
Status: DISCONTINUED | OUTPATIENT
Start: 2019-01-01 | End: 2020-01-01 | Stop reason: HOSPADM

## 2019-01-01 RX ORDER — INSULIN LISPRO 100 [IU]/ML
INJECTION, SOLUTION INTRAVENOUS; SUBCUTANEOUS
Status: DISCONTINUED | OUTPATIENT
Start: 2019-01-01 | End: 2020-01-01 | Stop reason: HOSPADM

## 2019-01-01 RX ORDER — HEPARIN SODIUM 10000 [USP'U]/100ML
12-25 INJECTION, SOLUTION INTRAVENOUS
Status: DISCONTINUED | OUTPATIENT
Start: 2019-01-01 | End: 2019-01-01

## 2019-01-01 RX ORDER — FUROSEMIDE 10 MG/ML
40 INJECTION INTRAMUSCULAR; INTRAVENOUS DAILY
Status: DISCONTINUED | OUTPATIENT
Start: 2019-01-01 | End: 2020-01-01

## 2019-01-01 RX ORDER — SIMETHICONE 80 MG
80 TABLET,CHEWABLE ORAL
Status: DISCONTINUED | OUTPATIENT
Start: 2019-01-01 | End: 2020-01-01 | Stop reason: HOSPADM

## 2019-01-01 RX ORDER — MELATONIN
1000 DAILY
Status: DISCONTINUED | OUTPATIENT
Start: 2019-01-01 | End: 2019-01-01 | Stop reason: HOSPADM

## 2019-01-01 RX ORDER — ONDANSETRON 2 MG/ML
4 INJECTION INTRAMUSCULAR; INTRAVENOUS
Status: DISCONTINUED | OUTPATIENT
Start: 2019-01-01 | End: 2019-01-01 | Stop reason: HOSPADM

## 2019-01-01 RX ORDER — VANCOMYCIN HYDROCHLORIDE
1250 ONCE
Status: COMPLETED | OUTPATIENT
Start: 2019-01-01 | End: 2019-01-01

## 2019-01-01 RX ORDER — INSULIN LISPRO 100 [IU]/ML
INJECTION, SOLUTION INTRAVENOUS; SUBCUTANEOUS
Status: DISCONTINUED | OUTPATIENT
Start: 2019-01-01 | End: 2019-01-01 | Stop reason: HOSPADM

## 2019-01-01 RX ORDER — FUROSEMIDE 40 MG/1
40 TABLET ORAL DAILY
Status: DISCONTINUED | OUTPATIENT
Start: 2019-01-01 | End: 2019-01-01 | Stop reason: HOSPADM

## 2019-01-01 RX ORDER — POTASSIUM CHLORIDE 750 MG/1
40 TABLET, FILM COATED, EXTENDED RELEASE ORAL
Status: COMPLETED | OUTPATIENT
Start: 2019-01-01 | End: 2019-01-01

## 2019-01-01 RX ORDER — FUROSEMIDE 40 MG/1
TABLET ORAL
Qty: 45 TAB | Refills: 5 | Status: ON HOLD | OUTPATIENT
Start: 2019-01-01 | End: 2020-01-01 | Stop reason: SDUPTHER

## 2019-01-01 RX ORDER — POLYETHYLENE GLYCOL 3350 17 G/17G
17 POWDER, FOR SOLUTION ORAL DAILY
Status: DISCONTINUED | OUTPATIENT
Start: 2019-01-01 | End: 2019-01-01 | Stop reason: HOSPADM

## 2019-01-01 RX ORDER — LEVOTHYROXINE SODIUM 88 UG/1
88 TABLET ORAL
Status: DISCONTINUED | OUTPATIENT
Start: 2019-01-01 | End: 2019-01-01 | Stop reason: HOSPADM

## 2019-01-01 RX ORDER — LEVOFLOXACIN 5 MG/ML
750 INJECTION, SOLUTION INTRAVENOUS
Status: COMPLETED | OUTPATIENT
Start: 2019-01-01 | End: 2019-01-01

## 2019-01-01 RX ORDER — SODIUM CHLORIDE 0.9 % (FLUSH) 0.9 %
5-10 SYRINGE (ML) INJECTION AS NEEDED
Status: DISCONTINUED | OUTPATIENT
Start: 2019-01-01 | End: 2020-01-01 | Stop reason: HOSPADM

## 2019-01-01 RX ORDER — DEXTROSE MONOHYDRATE 100 MG/ML
0-250 INJECTION, SOLUTION INTRAVENOUS AS NEEDED
Status: DISCONTINUED | OUTPATIENT
Start: 2019-01-01 | End: 2020-01-01 | Stop reason: HOSPADM

## 2019-01-01 RX ORDER — IBUPROFEN 600 MG/1
600 TABLET ORAL
Status: COMPLETED | OUTPATIENT
Start: 2019-01-01 | End: 2019-01-01

## 2019-01-01 RX ADMIN — ASPIRIN 81 MG 81 MG: 81 TABLET ORAL at 08:46

## 2019-01-01 RX ADMIN — FUROSEMIDE 40 MG: 10 INJECTION, SOLUTION INTRAMUSCULAR; INTRAVENOUS at 08:33

## 2019-01-01 RX ADMIN — Medication 10 ML: at 08:55

## 2019-01-01 RX ADMIN — INSULIN LISPRO 3 UNITS: 100 INJECTION, SOLUTION INTRAVENOUS; SUBCUTANEOUS at 12:57

## 2019-01-01 RX ADMIN — DIPHENHYDRAMINE HYDROCHLORIDE AND LIDOCAINE HYDROCHLORIDE AND ALUMINUM HYDROXIDE AND MAGNESIUM HYDRO 5 ML: KIT at 11:30

## 2019-01-01 RX ADMIN — CEFTRIAXONE 1 G: 1 INJECTION, POWDER, FOR SOLUTION INTRAMUSCULAR; INTRAVENOUS at 14:16

## 2019-01-01 RX ADMIN — INSULIN LISPRO 3 UNITS: 100 INJECTION, SOLUTION INTRAVENOUS; SUBCUTANEOUS at 13:10

## 2019-01-01 RX ADMIN — ACETAMINOPHEN 1000 MG: 500 TABLET ORAL at 22:16

## 2019-01-01 RX ADMIN — INSULIN LISPRO 3 UNITS: 100 INJECTION, SOLUTION INTRAVENOUS; SUBCUTANEOUS at 16:30

## 2019-01-01 RX ADMIN — FUROSEMIDE 80 MG: 10 INJECTION, SOLUTION INTRAMUSCULAR; INTRAVENOUS at 10:45

## 2019-01-01 RX ADMIN — ACETAMINOPHEN 1000 MG: 500 TABLET ORAL at 09:00

## 2019-01-01 RX ADMIN — MAGNESIUM SULFATE HEPTAHYDRATE 2 G: 40 INJECTION, SOLUTION INTRAVENOUS at 02:57

## 2019-01-01 RX ADMIN — SIMETHICONE CHEW TAB 80 MG 80 MG: 80 TABLET ORAL at 23:08

## 2019-01-01 RX ADMIN — INSULIN LISPRO 3 UNITS: 100 INJECTION, SOLUTION INTRAVENOUS; SUBCUTANEOUS at 13:02

## 2019-01-01 RX ADMIN — INSULIN LISPRO 2 UNITS: 100 INJECTION, SOLUTION INTRAVENOUS; SUBCUTANEOUS at 23:02

## 2019-01-01 RX ADMIN — INSULIN LISPRO 2 UNITS: 100 INJECTION, SOLUTION INTRAVENOUS; SUBCUTANEOUS at 07:04

## 2019-01-01 RX ADMIN — SIMETHICONE CHEW TAB 80 MG 80 MG: 80 TABLET ORAL at 00:17

## 2019-01-01 RX ADMIN — Medication 10 ML: at 21:27

## 2019-01-01 RX ADMIN — HEPARIN SODIUM 5000 UNITS: 5000 INJECTION INTRAVENOUS; SUBCUTANEOUS at 06:11

## 2019-01-01 RX ADMIN — ASCORBIC ACID, THIAMINE MONONITRATE,RIBOFLAVIN, NIACINAMIDE, PYRIDOXINE HYDROCHLORIDE, FOLIC ACID, CYANOCOBALAMIN, BIOTIN, CALCIUM PANTOTHENATE, 1 CAPSULE: 100; 1.5; 1.7; 20; 10; 1; 6000; 150000; 5 CAPSULE, LIQUID FILLED ORAL at 08:54

## 2019-01-01 RX ADMIN — Medication 10 ML: at 23:03

## 2019-01-01 RX ADMIN — INSULIN LISPRO 3 UNITS: 100 INJECTION, SOLUTION INTRAVENOUS; SUBCUTANEOUS at 18:26

## 2019-01-01 RX ADMIN — INSULIN LISPRO 3 UNITS: 100 INJECTION, SOLUTION INTRAVENOUS; SUBCUTANEOUS at 21:51

## 2019-01-01 RX ADMIN — INSULIN LISPRO 2 UNITS: 100 INJECTION, SOLUTION INTRAVENOUS; SUBCUTANEOUS at 11:54

## 2019-01-01 RX ADMIN — Medication 10 ML: at 07:26

## 2019-01-01 RX ADMIN — VANCOMYCIN HYDROCHLORIDE 1250 MG: 10 INJECTION, POWDER, LYOPHILIZED, FOR SOLUTION INTRAVENOUS at 14:52

## 2019-01-01 RX ADMIN — CLOPIDOGREL BISULFATE 75 MG: 75 TABLET, FILM COATED ORAL at 08:56

## 2019-01-01 RX ADMIN — ASPIRIN 81 MG 81 MG: 81 TABLET ORAL at 08:53

## 2019-01-01 RX ADMIN — ASPIRIN 81 MG 81 MG: 81 TABLET ORAL at 08:04

## 2019-01-01 RX ADMIN — INSULIN LISPRO 3 UNITS: 100 INJECTION, SOLUTION INTRAVENOUS; SUBCUTANEOUS at 22:13

## 2019-01-01 RX ADMIN — LEVOTHYROXINE SODIUM 88 MCG: 88 TABLET ORAL at 06:48

## 2019-01-01 RX ADMIN — Medication 1 CAPSULE: at 08:54

## 2019-01-01 RX ADMIN — INSULIN LISPRO 2 UNITS: 100 INJECTION, SOLUTION INTRAVENOUS; SUBCUTANEOUS at 16:27

## 2019-01-01 RX ADMIN — POLYETHYLENE GLYCOL 3350 17 G: 17 POWDER, FOR SOLUTION ORAL at 08:04

## 2019-01-01 RX ADMIN — Medication 10 ML: at 06:48

## 2019-01-01 RX ADMIN — Medication 10 ML: at 21:30

## 2019-01-01 RX ADMIN — HEPARIN SODIUM AND DEXTROSE 12 UNITS/KG/HR: 10000; 5 INJECTION INTRAVENOUS at 04:35

## 2019-01-01 RX ADMIN — LEVOTHYROXINE SODIUM 88 MCG: 88 TABLET ORAL at 06:59

## 2019-01-01 RX ADMIN — CARVEDILOL 3.12 MG: 3.12 TABLET, FILM COATED ORAL at 16:49

## 2019-01-01 RX ADMIN — INSULIN LISPRO 3 UNITS: 100 INJECTION, SOLUTION INTRAVENOUS; SUBCUTANEOUS at 17:23

## 2019-01-01 RX ADMIN — Medication 10 ML: at 13:49

## 2019-01-01 RX ADMIN — LEVOTHYROXINE SODIUM 88 MCG: 88 TABLET ORAL at 07:04

## 2019-01-01 RX ADMIN — INSULIN GLARGINE 8 UNITS: 100 INJECTION, SOLUTION SUBCUTANEOUS at 21:52

## 2019-01-01 RX ADMIN — SENNOSIDES 17.2 MG: 8.6 TABLET, FILM COATED ORAL at 10:45

## 2019-01-01 RX ADMIN — Medication 10 ML: at 21:45

## 2019-01-01 RX ADMIN — INSULIN LISPRO 3 UNITS: 100 INJECTION, SOLUTION INTRAVENOUS; SUBCUTANEOUS at 07:21

## 2019-01-01 RX ADMIN — INSULIN LISPRO 2 UNITS: 100 INJECTION, SOLUTION INTRAVENOUS; SUBCUTANEOUS at 16:30

## 2019-01-01 RX ADMIN — Medication 10 ML: at 06:00

## 2019-01-01 RX ADMIN — INSULIN GLARGINE 8 UNITS: 100 INJECTION, SOLUTION SUBCUTANEOUS at 21:27

## 2019-01-01 RX ADMIN — Medication 10 ML: at 05:59

## 2019-01-01 RX ADMIN — Medication 10 ML: at 14:19

## 2019-01-01 RX ADMIN — CALCIUM CARBONATE (ANTACID) CHEW TAB 500 MG 200 MG: 500 CHEW TAB at 01:22

## 2019-01-01 RX ADMIN — INSULIN GLARGINE 8 UNITS: 100 INJECTION, SOLUTION SUBCUTANEOUS at 22:15

## 2019-01-01 RX ADMIN — CLOPIDOGREL BISULFATE 75 MG: 75 TABLET, FILM COATED ORAL at 09:38

## 2019-01-01 RX ADMIN — INSULIN LISPRO 2 UNITS: 100 INJECTION, SOLUTION INTRAVENOUS; SUBCUTANEOUS at 08:53

## 2019-01-01 RX ADMIN — IBUPROFEN 600 MG: 600 TABLET, FILM COATED ORAL at 14:03

## 2019-01-01 RX ADMIN — Medication 10 ML: at 16:49

## 2019-01-01 RX ADMIN — CEFEPIME HYDROCHLORIDE 1 G: 1 INJECTION, POWDER, FOR SOLUTION INTRAMUSCULAR; INTRAVENOUS at 12:56

## 2019-01-01 RX ADMIN — SENNOSIDES 17.2 MG: 8.6 TABLET, FILM COATED ORAL at 08:53

## 2019-01-01 RX ADMIN — CLOPIDOGREL BISULFATE 75 MG: 75 TABLET, FILM COATED ORAL at 08:46

## 2019-01-01 RX ADMIN — INSULIN LISPRO 2 UNITS: 100 INJECTION, SOLUTION INTRAVENOUS; SUBCUTANEOUS at 07:34

## 2019-01-01 RX ADMIN — POLYETHYLENE GLYCOL 3350 17 G: 17 POWDER, FOR SOLUTION ORAL at 08:46

## 2019-01-01 RX ADMIN — ONDANSETRON 8 MG: 2 INJECTION INTRAMUSCULAR; INTRAVENOUS at 01:58

## 2019-01-01 RX ADMIN — CEFEPIME 2 G: 2 INJECTION, POWDER, FOR SOLUTION INTRAVENOUS at 12:00

## 2019-01-01 RX ADMIN — Medication 10 ML: at 22:13

## 2019-01-01 RX ADMIN — FUROSEMIDE 40 MG: 10 INJECTION, SOLUTION INTRAMUSCULAR; INTRAVENOUS at 08:47

## 2019-01-01 RX ADMIN — Medication 10 ML: at 07:29

## 2019-01-01 RX ADMIN — CARVEDILOL 3.12 MG: 3.12 TABLET, FILM COATED ORAL at 17:23

## 2019-01-01 RX ADMIN — CLOPIDOGREL BISULFATE 75 MG: 75 TABLET, FILM COATED ORAL at 08:04

## 2019-01-01 RX ADMIN — POLYETHYLENE GLYCOL 3350 17 G: 17 POWDER, FOR SOLUTION ORAL at 08:53

## 2019-01-01 RX ADMIN — CLOPIDOGREL BISULFATE 75 MG: 75 TABLET, FILM COATED ORAL at 08:53

## 2019-01-01 RX ADMIN — CEFEPIME HYDROCHLORIDE 1 G: 1 INJECTION, POWDER, FOR SOLUTION INTRAMUSCULAR; INTRAVENOUS at 07:21

## 2019-01-01 RX ADMIN — VITAMIN D 1000 UNITS: 25 TAB ORAL at 08:54

## 2019-01-01 RX ADMIN — CARVEDILOL 6.25 MG: 6.25 TABLET, FILM COATED ORAL at 16:28

## 2019-01-01 RX ADMIN — OXYCODONE 2.5 MG: 5 TABLET ORAL at 10:44

## 2019-01-01 RX ADMIN — Medication 5 MCG/MIN: at 12:53

## 2019-01-01 RX ADMIN — CEFTRIAXONE 1 G: 1 INJECTION, POWDER, FOR SOLUTION INTRAMUSCULAR; INTRAVENOUS at 12:07

## 2019-01-01 RX ADMIN — ONDANSETRON 4 MG: 2 INJECTION INTRAMUSCULAR; INTRAVENOUS at 13:15

## 2019-01-01 RX ADMIN — DIPHENHYDRAMINE HYDROCHLORIDE AND LIDOCAINE HYDROCHLORIDE AND ALUMINUM HYDROXIDE AND MAGNESIUM HYDRO 5 ML: KIT at 07:30

## 2019-01-01 RX ADMIN — POTASSIUM CHLORIDE 40 MEQ: 750 TABLET, FILM COATED, EXTENDED RELEASE ORAL at 08:58

## 2019-01-01 RX ADMIN — CEFEPIME HYDROCHLORIDE 2 G: 2 INJECTION, POWDER, FOR SOLUTION INTRAVENOUS at 14:03

## 2019-01-01 RX ADMIN — INSULIN LISPRO 2 UNITS: 100 INJECTION, SOLUTION INTRAVENOUS; SUBCUTANEOUS at 17:36

## 2019-01-01 RX ADMIN — Medication 4 MCG/MIN: at 15:48

## 2019-01-01 RX ADMIN — INSULIN LISPRO 3 UNITS: 100 INJECTION, SOLUTION INTRAVENOUS; SUBCUTANEOUS at 22:14

## 2019-01-01 RX ADMIN — ASPIRIN 81 MG 81 MG: 81 TABLET ORAL at 08:32

## 2019-01-01 RX ADMIN — CARVEDILOL 12.5 MG: 12.5 TABLET, FILM COATED ORAL at 08:55

## 2019-01-01 RX ADMIN — MAGNESIUM OXIDE TAB 400 MG (241.3 MG ELEMENTAL MG) 800 MG: 400 (241.3 MG) TAB at 12:58

## 2019-01-01 RX ADMIN — FUROSEMIDE 80 MG: 10 INJECTION, SOLUTION INTRAMUSCULAR; INTRAVENOUS at 03:21

## 2019-01-01 RX ADMIN — CEFTRIAXONE 1 G: 1 INJECTION, POWDER, FOR SOLUTION INTRAMUSCULAR; INTRAVENOUS at 11:54

## 2019-01-01 RX ADMIN — Medication 5 ML: at 22:00

## 2019-01-01 RX ADMIN — ASPIRIN 81 MG 81 MG: 81 TABLET ORAL at 08:56

## 2019-01-01 RX ADMIN — LEVOFLOXACIN 750 MG: 5 INJECTION, SOLUTION INTRAVENOUS at 14:34

## 2019-01-01 RX ADMIN — ALBUMIN (HUMAN) 25 G: 12.5 INJECTION, SOLUTION INTRAVENOUS at 10:35

## 2019-01-01 RX ADMIN — INSULIN LISPRO 3 UNITS: 100 INJECTION, SOLUTION INTRAVENOUS; SUBCUTANEOUS at 12:04

## 2019-01-01 RX ADMIN — CARVEDILOL 3.12 MG: 3.12 TABLET, FILM COATED ORAL at 08:32

## 2019-01-01 RX ADMIN — Medication 10 ML: at 14:30

## 2019-01-01 RX ADMIN — CEFEPIME 2 G: 2 INJECTION, POWDER, FOR SOLUTION INTRAVENOUS at 13:10

## 2019-01-01 RX ADMIN — CLOPIDOGREL BISULFATE 75 MG: 75 TABLET, FILM COATED ORAL at 08:32

## 2019-01-01 RX ADMIN — EPOETIN ALFA-EPBX 20000 UNITS: 10000 INJECTION, SOLUTION INTRAVENOUS; SUBCUTANEOUS at 12:54

## 2019-01-01 RX ADMIN — INSULIN GLARGINE 8 UNITS: 100 INJECTION, SOLUTION SUBCUTANEOUS at 00:48

## 2019-01-01 RX ADMIN — Medication 10 ML: at 14:00

## 2019-01-01 RX ADMIN — INSULIN LISPRO 4 UNITS: 100 INJECTION, SOLUTION INTRAVENOUS; SUBCUTANEOUS at 22:14

## 2019-01-01 RX ADMIN — FUROSEMIDE 40 MG: 10 INJECTION, SOLUTION INTRAMUSCULAR; INTRAVENOUS at 08:04

## 2019-01-01 RX ADMIN — CLOPIDOGREL BISULFATE 75 MG: 75 TABLET ORAL at 08:55

## 2019-01-01 RX ADMIN — ASPIRIN 81 MG 81 MG: 81 TABLET ORAL at 09:38

## 2019-01-01 RX ADMIN — Medication 10 ML: at 22:14

## 2019-01-01 RX ADMIN — LEVOTHYROXINE SODIUM 88 MCG: 88 TABLET ORAL at 07:21

## 2019-01-01 RX ADMIN — FUROSEMIDE 40 MG: 40 TABLET ORAL at 08:54

## 2019-01-01 RX ADMIN — Medication 10 ML: at 13:15

## 2019-01-01 RX ADMIN — LEVOTHYROXINE SODIUM 88 MCG: 88 TABLET ORAL at 06:51

## 2019-01-01 RX ADMIN — SODIUM CHLORIDE 75 ML/HR: 900 INJECTION, SOLUTION INTRAVENOUS at 06:09

## 2019-01-01 RX ADMIN — POLYETHYLENE GLYCOL 3350 17 G: 17 POWDER, FOR SOLUTION ORAL at 08:54

## 2019-01-01 RX ADMIN — MECLIZINE 25 MG: 12.5 TABLET ORAL at 01:58

## 2019-01-01 RX ADMIN — LEVOTHYROXINE SODIUM 88 MCG: 88 TABLET ORAL at 07:28

## 2019-01-01 RX ADMIN — SODIUM CHLORIDE 1000 ML: 900 INJECTION, SOLUTION INTRAVENOUS at 13:15

## 2019-01-01 RX ADMIN — ASPIRIN 81 MG: 81 TABLET, COATED ORAL at 08:55

## 2019-01-01 RX ADMIN — FUROSEMIDE 40 MG: 10 INJECTION, SOLUTION INTRAMUSCULAR; INTRAVENOUS at 08:54

## 2019-01-01 RX ADMIN — SODIUM CHLORIDE 768 ML: 900 INJECTION, SOLUTION INTRAVENOUS at 13:58

## 2019-01-01 RX ADMIN — CARVEDILOL 3.12 MG: 3.12 TABLET, FILM COATED ORAL at 08:04

## 2019-01-01 RX ADMIN — POLYETHYLENE GLYCOL 3350 17 G: 17 POWDER, FOR SOLUTION ORAL at 10:46

## 2019-01-01 RX ADMIN — CEFEPIME HYDROCHLORIDE 1 G: 1 INJECTION, POWDER, FOR SOLUTION INTRAMUSCULAR; INTRAVENOUS at 19:20

## 2019-01-01 RX ADMIN — INSULIN LISPRO 5 UNITS: 100 INJECTION, SOLUTION INTRAVENOUS; SUBCUTANEOUS at 07:27

## 2019-01-01 RX ADMIN — INSULIN LISPRO 2 UNITS: 100 INJECTION, SOLUTION INTRAVENOUS; SUBCUTANEOUS at 07:30

## 2019-01-01 RX ADMIN — ANASTROZOLE 1 MG: 1 TABLET ORAL at 08:55

## 2019-01-01 RX ADMIN — INSULIN GLARGINE 8 UNITS: 100 INJECTION, SOLUTION SUBCUTANEOUS at 22:02

## 2019-01-11 ENCOUNTER — OFFICE VISIT (OUTPATIENT)
Dept: CARDIOLOGY CLINIC | Age: 84
End: 2019-01-11

## 2019-01-11 VITALS
BODY MASS INDEX: 20.16 KG/M2 | SYSTOLIC BLOOD PRESSURE: 132 MMHG | HEIGHT: 65 IN | HEART RATE: 68 BPM | WEIGHT: 121 LBS | DIASTOLIC BLOOD PRESSURE: 60 MMHG

## 2019-01-11 DIAGNOSIS — I25.10 CORONARY ARTERY DISEASE INVOLVING NATIVE CORONARY ARTERY OF NATIVE HEART WITHOUT ANGINA PECTORIS: ICD-10-CM

## 2019-01-11 DIAGNOSIS — E78.2 MIXED HYPERLIPIDEMIA: ICD-10-CM

## 2019-01-11 DIAGNOSIS — I50.23 ACUTE ON CHRONIC SYSTOLIC HF (HEART FAILURE) (HCC): ICD-10-CM

## 2019-01-11 DIAGNOSIS — I10 ESSENTIAL HYPERTENSION: ICD-10-CM

## 2019-01-11 DIAGNOSIS — I25.5 ISCHEMIC CARDIOMYOPATHY: Primary | ICD-10-CM

## 2019-01-11 NOTE — PROGRESS NOTES
Chief Complaint   Patient presents with    Cardiomyopathy    Coronary Artery Disease     Verified patient with two types of identifiers. Verified medications with the patient.     Verified patient's pharmacy

## 2019-01-15 ENCOUNTER — PATIENT OUTREACH (OUTPATIENT)
Dept: CASE MANAGEMENT | Age: 84
End: 2019-01-15

## 2019-01-29 DIAGNOSIS — E78.2 MIXED HYPERLIPIDEMIA: Primary | ICD-10-CM

## 2019-01-31 RX ORDER — EVOLOCUMAB 140 MG/ML
INJECTION, SOLUTION SUBCUTANEOUS
Qty: 2 PEN | Refills: 11 | Status: SHIPPED | OUTPATIENT
Start: 2019-01-31 | End: 2020-01-01

## 2019-02-18 ENCOUNTER — OFFICE VISIT (OUTPATIENT)
Dept: RHEUMATOLOGY | Age: 84
End: 2019-02-18

## 2019-02-18 VITALS
SYSTOLIC BLOOD PRESSURE: 125 MMHG | HEIGHT: 65 IN | DIASTOLIC BLOOD PRESSURE: 69 MMHG | RESPIRATION RATE: 18 BRPM | HEART RATE: 74 BPM | TEMPERATURE: 98 F | WEIGHT: 120 LBS | BODY MASS INDEX: 19.99 KG/M2

## 2019-02-18 DIAGNOSIS — C50.919 METASTATIC BREAST CANCER (HCC): ICD-10-CM

## 2019-02-18 DIAGNOSIS — Z79.60 LONG-TERM USE OF IMMUNOSUPPRESSANT MEDICATION: ICD-10-CM

## 2019-02-18 DIAGNOSIS — E79.0 ASYMPTOMATIC HYPERURICEMIA: ICD-10-CM

## 2019-02-18 DIAGNOSIS — M81.0 AGE-RELATED OSTEOPOROSIS WITHOUT CURRENT PATHOLOGICAL FRACTURE: ICD-10-CM

## 2019-02-18 DIAGNOSIS — M05.79 SEROPOSITIVE RHEUMATOID ARTHRITIS OF MULTIPLE SITES (HCC): Primary | ICD-10-CM

## 2019-02-18 DIAGNOSIS — N18.30 CKD (CHRONIC KIDNEY DISEASE) STAGE 3, GFR 30-59 ML/MIN (HCC): ICD-10-CM

## 2019-02-18 PROBLEM — N17.9 ACUTE RENAL FAILURE SUPERIMPOSED ON STAGE 3 CHRONIC KIDNEY DISEASE (HCC): Status: RESOLVED | Noted: 2018-06-21 | Resolved: 2019-02-18

## 2019-02-18 RX ORDER — INSULIN ASPART 100 [IU]/ML
INJECTION, SOLUTION INTRAVENOUS; SUBCUTANEOUS
COMMUNITY
End: 2020-01-01

## 2019-02-18 RX ORDER — INSULIN DEGLUDEC 100 U/ML
14 INJECTION, SOLUTION SUBCUTANEOUS DAILY
COMMUNITY

## 2019-02-18 NOTE — PROGRESS NOTES
REASON FOR VISIT This is an acute for Ms. Ledesma for Seropositive Erosive Nodular Rheumatoid Arthritis and Osteoporosis. Inflammatory arthritis phenotype includes: Anti-CCP positive: yes (71) Rheumatoid factor positive: yes (81.8) Erosive disease: yes Extra-articular manifestations include: rheumatoid nodules Immunosuppression Screening (12/20/2017): Quantiferon TB: negative PPD:  Not performed Hepatitis B: negative Hepatitis C: negative Therapy History includes: 
Current DMARD therapy includes: none Prior DMARD therapy includes: methotrexate 25 mg subcutaneous, Orencia subcutaneous (3/2017-9/2017), Enbrel 50 mg weekly (10/2017-4/2018) The following DMARDs have been ineffective: Martinez The following DMARDs were stopped because of side effects: none Contra-indicated DMARDs because of chronic kidney disease: methotrexate Contra-indicated DMARDs because of heart failure: anti-TNF Osteoporosis Historical Synopsis 
  Height loss since age 29 (at least two inches): 1.5 Fracture history includes: yes (right radius and ulna, hairline in legs, L3, T10, T12 vertebral fractures) Family history of hip fracture: no 
  
Daily calcium intake is 1200 mg 
Daily vitamin D intake is 1000 IU 
  
Smoking history: no 
Alcohol consumption: no 
Prednisone history: no 
  
Exercise: yes (walks around the house, core exercises) 
  
Previous work-up for osteoporosis includes the following: DEXA Scan: 11/23/2016 Vitamin 25OH D level: 67.0 (previously 82.3) PTH: 19 
  
Therapy History includes: 
Current osteoporosis therapy includes: Xgeva (10/2018 to present) Prior osteoporosis therapy includes: Lilia Padilla (1/19/2017-2/11/2019) The following osteoporosis have been ineffective: none The following osteoporosis were stopped because of side effects: none 
  
Immunizations:  
Immunization History Administered Date(s) Administered  Influenza High Dose Vaccine PF 11/01/2016  Influenza Vaccine Split 10/15/2010, 10/01/2011  Influenza Vaccine Whole 10/01/2004  ZZZ-RETIRED (DO NOT USE) Pneumococcal Vaccine (Unspecified Type) 06/29/2006 Active problems include: 
 
Patient Active Problem List  
Diagnosis Code  DM (diabetes mellitus) ([facility] E11.9  Hypothyroid E03.9  Colon cancer ([facility]) C18.9  
 H/O: CVA  Microalbuminuria R80.9  Age-related osteoporosis without current pathological fracture M81.0  
 Essential hypertension I10  
 Anemia D64.9  Hyperlipidemia E78.5  Carotid bruit R09.89  
 Claudication [facility]) I73.9  Weakness of left arm R29.898  PAD (peripheral artery disease) ([facility]) I73.9  Weakness due to cerebrovascular accident [id number]  Neuropathy in diabetes ([facility] E11.40  
 Occlusion and stenosis of carotid artery without mention of cerebral infarction I65.29  
 Seropositive rheumatoid arthritis of multiple sites ([facility] M05.79  
 Primary osteoarthritis of both knees M17.0  Long-term use of immunosuppressant medication Z79.899  Statin intolerance Z78.9  Asymptomatic hyperuricemia E79.0  Vitamin D deficiency E55.9  CKD (chronic kidney disease) stage 3, GFR 30-59 ml/min ([facility]) N18.3  Altered mental status, unspecified J39.81  
 Acute systolic heart failure ([facility]) I50.21  
 Acute on chronic systolic HF (heart failure) ([facility]) I50.23  
 Metastatic breast cancer ([facility]) C50.919  
 Goals of care, counseling/discussion Z71.89  
 Acute respiratory failure ([facility]) J96.00  Elevated troponin R74.8  CAD (coronary artery disease) I25.10  Acute renal failure superimposed on stage 3 chronic kidney disease (HCC) N17.9, N18.3  Gout flare M10.9  Hyperglycemia due to type 2 diabetes mellitus ([facility] E11.65  Type 2 diabetes with nephropathy ([facility]) E11.21  
 
HISTORY OF PRESENT ILLNESS Ms. Perla Finnegan returns for an acute visit.  
 
On her last visit, I discontinued methotrexate due to renal impairment and ordered a triple phase bone scan due to abnormal MRI suggesting metastatic. She was then hospitalized and found to have metastatic left breast cancer. I reviewed Dr. Ana Fernandez office note of 1/25/2018 and is on Arimidex and Ibrance with mild to moderate leukopenia that has required interval holding. He also started her on Procrit due to CKD. She has been on Xgeva since 10/2018 and was on Forteo until one week ago. I had rejected Forteo refills in 1/2019 due to the course of 24 months. She also was diagnosed with heart failure. Today, she complains of stiffness in her hands that does not loosen up until the afternoon. She denies pain and swelling. She has not noticed worsening joint pain Ibrance She endorses chronic blurred vision for the past 6 months black stools from iron tablets She denies fever, weight loss, vision loss, oral ulcers, ankle swelling, dry cough, dyspnea, nausea, vomiting, dysphagia, abdominal pain, bloody stools,, fall since last visit, rash, easy bruising and increased thirst. 
 
Last toxicity monitoring by blood work was done on 1/25/2019 and did not reveal any significant adverse effects, Hct 28.4%, creatinine 1.60 mg/dL, eGFR 30. Labs on 7/14/2018 showed  (previously 283, 137, 155). Most recent inflammatory markers from 9/28/2017 revealed a ESR 18 mm/hr (previously 6, 12 mm/hr) and CRP 1.5 mg/dL (previously 0.7, 1.4 mg/L). The patient has had any interval hospital admissions and surgeries. REVIEW OF SYSTEMS A comprehensive review of systems was performed and pertinent results are documented in the HPI, review of systems is otherwise non-contributory. PAST MEDICAL HISTORY She has a past medical history of Anemia (9/2/2009), Breast cancer [facility]), CHF (congestive heart failure) ([facility] Colon cancer ([facility] (9/2/2009), Colon cancer ([facility] (9/2/2009), DM (diabetes mellitus) ([facility] (9/2/2009), GERD (gastroesophageal reflux disease), H/O: CVA (9/2/2009), Heart attack [facility]), Hypothyroid (9/2/2009), Ill-defined condition, Microalbuminuria (9/2/2009), Osteoporosis (9/2/2009), Other and unspecified hyperlipidemia (1/27/2010), Rheumatoid arthritis involving ankle ([address] [facility] (9/28/2016), Rheumatoid arthritis(714.0) (9/2/2009), Unspecified essential hypertension (9/2/2009), and Weakness due to cerebrovascular accident. She also has no past medical history of Abuse, Adult physical abuse, Arrhythmia, Calculus of kidney, Chronic pain, Congestive heart failure, unspecified, Contact dermatitis and other eczema, due to unspecified cause, COPD, Depression, Headache(784.0), Psychotic disorder ([facility] Unspecified adverse effect of anesthesia, or Unspecified sleep apnea. FAMILY HISTORY Her family history includes Cancer in her father; Diabetes in her brother, mother, sister, and sister; Other in her sister; Stroke in her mother. SOCIAL HISTORY She reports that  has never smoked. she has never used smokeless tobacco. She reports that she does not drink alcohol or use drugs. MEDICATIONS Current Outpatient Medications Medication Sig Dispense Refill  insulin aspart U-100 (NOVOLOG U-100 INSULIN ASPART) 100 unit/mL injection by SubCUTAneous route. Sliding scale  insulin degludec (TRESIBA FLEXTOUCH U-100) 100 unit/mL (3 mL) inpn 10 Units by SubCUTAneous route daily.  denosumab (XGEVA SC) by SubCUTAneous route every thirty (30) days.  epoetin celio (PROCRIT) 10,000 unit/mL injection by SubCUTAneous route once. Unsure of dosage  REPATHA SURECLICK pen injection INJECT 1 ML SUBCUTANEOUS EVERY 14 DAYS 2 Pen 11  
 FORTEO 20 mcg/dose - 600 mcg/2.4 mL pnij injection INJECT 20 MCG UNDER THE SKIN EVERY DAY 2.4 mL 0  
 clopidogrel (PLAVIX) 75 mg tab Take 1 Tab by mouth daily. 30 Tab 5  furosemide (LASIX) 40 mg tablet Take 2 Tabs by mouth daily. (Patient taking differently: Take 40 mg by mouth daily. Takes 40mg alternating with 80mg every other day) 60 Tab 5  carvedilol (COREG) 6.25 mg tablet Take 1 Tab by mouth two (2) times daily (with meals). 60 Tab 5  
 magnesium hydroxide (CORONA MILK OF MAGNESIA) 400 mg/5 mL suspension Take 30 mL by mouth daily as needed for Constipation.  palbociclib (IBRANCE) 125 mg cap Take  by mouth daily. Indications: 21 days on medication, 7 days off medication  acetaminophen (TYLENOL) 325 mg tablet Take 2 Tabs by mouth every four (4) hours as needed. 10 Tab 0  
 b-complex with vitamin c tablet Take 1 Tab by mouth daily. 30 Tab 0  
 nitroglycerin (NITROSTAT) 0.4 mg SL tablet 1 Tab by SubLINGual route as needed for Chest Pain. Up to 3 doses. 40 Tab 0  
 anastrozole (ARIMIDEX) 1 mg tablet Take 1 Tab by mouth daily. 90 Tab 0  
 polyethylene glycol (MIRALAX) 17 gram packet Take 1 Packet by mouth daily. 1 Each 0  
 levothyroxine (SYNTHROID) 88 mcg tablet Take 88 mcg by mouth Daily (before breakfast).  folic acid (FOLVITE) 1 mg tablet TAKE ONE TABLET BY MOUTH DAILY 90 Tab 2  
 aspirin delayed-release 81 mg tablet Take 81 mg by mouth daily.  OMEGA-3 FATTY ACIDS/FISH OIL (OMEGA 3 FISH OIL PO) Take 300 mg by mouth daily.  CHOLECALCIFEROL, VITAMIN D3, (VITAMIN D-3 PO) Take 1,000 Units by mouth daily. ALLERGIES Allergies Allergen Reactions  Statins-Hmg-Coa Reductase Inhibitors Other (comments) Intolerant to statins  Sulfa (Sulfonamide Antibiotics) Other (comments)  
  syncope PHYSICAL EXAMINATION Visit Vitals /69 Pulse 74 Temp 98 °F (36.7 °C) Resp 18 Ht 5' 5\" (1.651 m) Wt 120 lb (54.4 kg) BMI 19.97 kg/m² Body mass index is 19.97 kg/m². General: Patient is alert, oriented x 3, not in acute distress, sitting in wheelchair son at bedside HEENT:  
Sclerae are not injected and appear moist. 
There is no alopecia. Cardiovascular: 
Heart is regular rate and rhythm, no murmurs. Chest: 
Lungs are clear to auscultation bilaterally.  No rhonchi, wheezes, or crackles. Extremities: 
Free of clubbing, cyanosis, edema Skin exam: 
There are no rashes, no alopecia, no discoid lesions, no active Raynaud's, no livedo reticularis, no periungual erythema. Post-inflammatory hyperpigmentation from Pustular herpetic lesions from her left lower back dermatomally with anterior involvement Musculoskeletal exam: A comprehensive musculoskeletal exam was performed for all joints of each upper and lower extremity and assessed for swelling, tenderness and range of motion. Positive results are documented as below: 
 
Bilateral knee crepitus without effusion Joint Count 2/18/2019 4/3/2018 12/20/2017 9/28/2017 6/16/2017 2/16/2017 12/19/2016 Patient pain (0-100) 5 15 30 (No Data) 10 60 30 MHAQ 1.75 1.615 0.75 (No Data) 0.875 2 1.38 Left elbow - Tender - - - - - 1 - Left elbow - Swollen - - - - - 1 - Left wrist- Tender - - - 1 - - - Left wrist- Swollen - - - - 1 1 - Left 1st MCP - Tender - - - - - - - Left 1st MCP - Swollen - - - 1 1 1 1 Left 2nd MCP - Tender - - - - - - - Left 2nd MCP - Swollen - - - - - - - Left 3rd MCP - Tender - - - 1 - - 1 Left 3rd MCP - Swollen - - - 1 - 1 1 Left 4th MCP - Tender - - - - - - - Left 4th MCP - Swollen - - - - - - - Left 5th MCP - Tender - - - - - - - Left 5th MCP - Swollen - - - - 1 - - Left thumb IP - Tender - - - - - - - Left thumb IP - Swollen - - - - - - - Left 2nd PIP - Tender - - - - - - - Left 2nd PIP - Swollen - - - - - - - Left 3rd PIP - Tender - - - - - 1 - Left 3rd PIP - Swollen - - - - - - - Left 4th PIP - Tender - - - - - - - Left 5th PIP - Tender - - - - - - - Right shoulder - Tender - - - 1 - - - Right elbow - Tender - - - - 1 1 - Right elbow - Swollen - - - - 1 1 - Right wrist- Tender 1 - 1 1 1 1 1 Right wrist- Swollen 1 1 1 1 1 1 1 Right 1st MCP - Tender 1 1 - 1 1 1 1 Right 1st MCP - Swollen 1 1 - 1 1 1 1 Right 2nd MCP - Tender 1 1 - - - - -  
 Right 2nd MCP - Swollen 1 - - - 1 - - Right 3rd MCP - Tender 1 1 1 1 1 1 1 Right 3rd MCP - Swollen 1 1 1 1 1 1 1 Right 4th MCP - Tender 1 1 1 1 1 - 1 Right 4th MCP - Swollen 1 1 1 1 1 - 1 Right 5th MCP - Tender 1 1 - 1 1 1 1 Right 5th MCP - Swollen 1 1 - 1 1 1 1 Right thumb IP - Tender 1 1 - - 1 - 1 Right 2nd PIP - Tender - 1 - - 1 1 - Right 2nd PIP - Swollen - 1 - - 1 1 - Right 3rd PIP - Tender - 1 - - 1 1 - Right 4th PIP - Tender - 1 - 1 1 1 - Right 5th PIP - Tender - - - 1 1 1 - Tender Joint Count (Total) 7 9 3 10 11 11 7 Swollen Joint Count (Total) 6 6 3 7 11 10 7 Physician Assessment (0-10) 2 3 2 4 3 3 3 Patient Assessment (0-10) 8.5 4.5 2.5 (No Data) 0.5 5.5 6 CDAI Total (calculated) 23.5 22.5 10.5 - 25.5 29.5 23 DATA REVIEW Laboratory Recent laboratory results were reviewed, summarized, and discussed with the patient. Imaging Musculoskeletal Ultrasound None Radiographs Chest 4/16/2018: normal heart size. Mild pulmonary edema is noted. Degenerative changes are seen in the thoracic spine. Calcified granuloma is seen in the right upper lobe. 
  
Chest 2/09/2018: Cardiac silhouette is enlarged. Pulmonary vasculature is not engorged. There are no focal consolidation, effusions, or pneumothorax. Ossified granuloma right upper lobe. Aorta remains atherosclerotic Right shoulder 2/09/2018: no fracture, dislocation or other acute abnormality. Bones are osteopenic. Incidental granuloma right upper lobe Lumbar 2/09/2018: superior endplate compression of L3 with approximately 50% loss of vertebral body height. Superior endplate compression of T12 with approximately one third loss of vertebral body height. Slight compression of T10. Osteopenia. Degenerative changes throughout the spine. Vascular calcifications Chest 9/28/2016: normal heart size.  Is atherosclerotic change of the aorta. The lungs are clear acute process. There is calcified granuloma in the right upper lobe with calcified right hilar lymph nodes. There is moderate compression deformity of approximately the T12 vertebral body that appears chronic. There are degenerative changes of the thoracic spine. Bilateral Hand 9/28/2016: LEFT: Bones are osteopenic. No acute fracture or dislocation. Moderate radiocarpal joint osteoarthritis. Age-appropriate intercarpal and first Aia 16 joint osteoarthritis. There are erosions at the third MCP joint. No other erosion. No other joint space narrowing. No soft tissue calcification.  RIGHT: No acute fracture. Old radius intra-articular fracture has healed. Increased now moderate radiocarpal joint osteoarthritis. Increased mild first MCP joint osteoarthritis. Increased mild-moderate first IP joint osteoarthritis. No joint space erosion or periosteal reaction. Alignment is within normal limits. Osteopenia is unchanged. No soft tissue calcification. Bilateral Foot 9/28/2016: LEFT: Bones are osteopenic. No acute fracture or dislocation. No erosion. Age-appropriate bilateral first MTP joint osteoarthritis. Minimal intertarsal osteoarthritis for age. No periosteal reaction or soft tissue calcification. RIGHT: Bones are osteopenic. No acute fracture or dislocation. No erosion. Age-appropriate bilateral first MTP joint osteoarthritis. Minimal intertarsal osteoarthritis for age. No periosteal reaction or soft tissue calcification. Right Hand 4/10/2010: showed demonstrate osteopenia. Chelo Robert is an acute T-shaped intra-articular distal radial fracture. Ulna styloid peri like avulsion is also noted. There is soft tissue swelling CT Imaging CT Head, Abdomen, and Pelvis without contrast 4/24/2018: The patient's arms at her sides cause scatter artifact over the examination.  Left breast carcinoma: A spiculated left breast mass at 6:00 in the posterior third extends to the dermal-epidermal junction, and probably to the pectoralis muscle. It measures approximately 21 x 32 x 27 mm, and enhanced more avidly than other tissue in the left breast or any tissue in the right breast on prior MRI. There is probably more extensive disease throughout the left breast. Left breast skin thickening is associated. There is diffuse, mixed lucent and sclerotic, osseous metastatic disease. There are pathologic fractures of T12 and L3. Though no displaced fracture is visible, the degree of tumor replacement of the bilateral sacral wings makes occult or impending pathologic fracture likely. There are multiple bilateral pathologic rib fractures. The posterior cortices of T10, T11, and L1 are largely destroyed. No definite epidural extension on this unenhanced CT. Inferior chest: Moderate bilateral pleural effusions are associated with subtotal collapse of the bilateral lower lobes. Interlobular septal thickening is consistent with interstitial edema. The heart is mildly enlarged, and three-vessel coronary artery calcifications are severe. Aortic valvular calcifications are moderate. Calcified hilar lymph nodes, and splenic and hepatic calcifications, are consistent with old granulomatous disease. Abdomen: Unenhanced CT is neither sensitive nor specific for pyelonephritis, which is manifest on cross-sectional imaging by hazy perfusion defects. No gross perfusion defects on motion limited MRI from 4/20/2018. There is a large left lower pole renal calculus; no definite calyceal dilation upstream. Other bilateral renal calculi are parenchymal and vascular, rather than urinary, in origin. The unenhanced distal esophagus, stomach, duodenum, gallbladder, pancreas, and adrenals are normal. Pelvis: A large stool ball is impacted in the rectum. Surrounding presacral and mesorectal edema suggest stroke or a proctitis. A Alford catheter is in place in the bladder.  The unenhanced small bowel and proximal colon are normal. There is trace ascites. No free air or abdominopelvic lymphadenopathy. CT Head without contrast 2/09/2018: There is diffuse age-related parenchymal volume loss. The ventricles and sulci are age-appropriate without hydrocephalus. There is no mass effect or midline shift. There is no intracranial hemorrhage or extra-axial fluid collection. Scattered foci of low attenuation in the periventricular white matter most likely represent age-indeterminate microvascular ischemic changes. The gray-white matter differentiation is maintained. The basal cisterns are patent. The osseous structures are intact. There is diffuse paranasal sinus opacification with aerated secretions and fluid levels in the maxillary and sphenoid sinuses. The mastoid air cells are clear. MR Imaging MRI Abdomen with and without contrast 4/20/2018: MRCP: No pancreatic or biliary ductal dilation. No stricture or choledocholithiasis. LIVER: Suboptimal postcontrast imaging secondary to motion. 2 subcentimeter T2 hyperintense foci are noted in the right lobe, most likely representing tiny cysts. There are multiple suspicious lesions seen on the diffusion weighted sequences in the right and left hepatic lobes. The largest of these measures approximately 16mm. Many of these have subtle associated T2 hyperintense signal. Metastatic disease is suspected. GALLBLADDER: Contracted. No gallstones. PANCREAS: Normal. SPLEEN: Normal. ADRENALS: Normal. KIDNEYS: Normal. DISTAL ESOPHAGUS: Normal. STOMACH AND DUODENUM: Normal. VISUALIZED SMALL BOWEL AND COLON: Normal. PERITONEUM: No free fluid and no abdominal lymphadenopathy. VISUALIZED LUNG BASES: Small bilateral pleural effusions are noted with associated atelectasis/consolidation in both lower lobes. BONES: Widespread osseous metastases are noted. CHEST WALL: There is a spiculated mass in the central aspect of the left breast measuring 3.5 x 3.2 x 2.8 cm.  A smaller discrete mass measuring 0.9 x 1.3 cm seen medially in the left breast. Multiple other areas of concerning areas of enhancement are noted in the left breast, not well assessed on the current study. MRI Brain with and without contrast 3/19/2018: EXTRA-AXIAL SPACES: Prominent cortical sulci, consistent with cerebral volume loss. Prominence of the sylvian fissures and basal cisterns. INTRACRANIAL HEMORRHAGE: None. VENTRICULAR SYSTEM:  Normal in size and morphology for the patient's age. BASAL CISTERNS:  Normal. CEREBRAL PARENCHYMA:  Extensive scattered and confluent foci of FLAIR/T2 hyperintensity in the cerebral white matter, most likely due to intracranial small vessel disease. No enhancing lesions. No evidence of diffusion restriction. MIDLINE SHIFT: None. CEREBELLUM:  Inferior vermian hypoplasia. Mild volume loss. BRAINSTEM:  Diffusely atrophic. Chronic bilateral pontine lacunar infarcts, right more extensive than left. CALVARIUM:  Multiple scattered T1 hypointense foci in the calvarium, suspicious for metastatic disease. Not identified as destructive lesions on recent head CT. VASCULAR SYSTEM:  Normal flow voids. PARANASAL SINUSES AND MASTOID AIR CELLS:  Clear. VISUALIZED ORBITS: Previous bilateral cataract surgery. VISUALIZED UPPER CERVICAL SPINE:  Normal. SELLA: Normal. SKULL BASE: Normal. No cranial nerve thickening or abnormal enhancement, but some images compromised by patient motion. DXA 
  
DXA 11/23/2016: (L3 for compression fracture and L4 for spondylitic change) lumbar spine L1-L2 T score -1.7 (BMD 0.971 g/cm2), right femoral neck T score: -2.0 (0.757 g/cm2), righttotal hip T score: -1.7 (0.791 g/cm2), and distal one third left radius T score -4.6 (0.472 g/cm2). FRAX score 33.2 % probability in 10 years for major osteoporotic fracture and 21 % 10 year probability of hip fracture.  
 
Echocardiogram 
 
Echocardiogram 6/01/2018: LEFT VENTRICLE: Size was normal. Systolic function was moderately to markedly reduced. Ejection fraction was estimated to be 30 %. No obvious wall motion abnormalities identified in the views obtained. RIGHT VENTRICLE: The size was normal. Systolic function was normal. LEFT ATRIUM: Size was normal. RIGHT ATRIUM: Size was normal. MITRAL VALVE: Normal valve structure. AORTIC VALVE: Normal valve structure. TRICUSPID VALVE: Normal valve structure. PULMONIC VALVE: Not well visualized. AORTA: The root exhibited normal size. PERICARDIUM: A small pericardial effusion was identified circumferential to the heart. There was no evidence of hemodynamic compromise. Echocardiogram 8/23/2013: Left ventricle: Systolic function was normal. Ejection fraction was estimated in the range of 55 % to 60 %. There were no regional wall motion abnormalities. Doppler parameters were consistent with abnormal left ventricular relaxation (grade 1 diastolic dysfunction). Left atrium: The atrium was moderately dilated. Atrial septum: There was a probable patent foramen ovale. Right atrium: The atrium was mildly dilated. Mitral valve: There was mild annular calcification. Aortic valve: The valve was not visualized well enough to rule out a bicuspid morphology. Transaortic velocity was increased due to increased transvalvular flow. Tricuspid valve: There was mild regurgitation. Nuclear Medicine Triple Phase Bone Scan 4/20/2018: There are numerous skeletal foci of activity compatible with metastases. There are multiple in the skull as well as throughout the spine, sternum, ribs, shoulders, pelvis and femora. PATHOLOGY Left breast, core biopsies 4/23/2018: Sclerosed and necrotic papillary lesion with focal areas concerning for invasive carcinoma. Estrogen and Progesterone positive ASSESSMENT AND PLAN This is a follow-up visit for Ms. Ledesma. 1) Seropositive Erosive Nodular Rheumatoid Arthritis.  She is off treatment due metastatic breast cancer. She is currently receiving Ibrance without worsening of her Rheumatoid Arthritis. She feels joint stiffness but denies pain. She is also on Arimidex but does not feel worse while on either of these agents and off methotrexate and Enbrel. Her CDAI was 23.5 (previously 22.5, 10.5, 25.5, 29.5, 23, 47, 23) with 7 tender and 6 swollen joints, consistent with hazel disease activity. We discussed goals of treatment and cost benefit. Although her CDAI is high, she does not appear to be too symptomatic. Her PtGlobal is disproportional to her exam. If treatment is needed per her symptoms, I believe rituximab is an option. I would avoid methotrexate due to her chronic kidney disease. I would avoid anti-TNFs due to her heart failure. I will reach out to Dr. Lana Shipley to discuss. 2) Long Term Use of Immunosuppressants. The patient remains off immunomodulatory medications. 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 She is taking calcium 1200 mg and 1000 of vitamin D daily. She has completed two months of Forteo and is now on Xgeva due to breast cancer and bony metastasis.   
4) Bilateral Knee Osteoarthritis. This was not an active issue. 5) Abnormal Gait. She is dependent on a walker to ambulate. There are no recent falls. 6) Asymptomatic Hyperuricemia. Her uric acid was 11.7  mg/dL (previously 8.7, 7.2 mg/dL). There is no history of gout. 7) Vitamin D Deficiency. Her level was 67.0 (previously 82.3, 59.4). She is on 1000 daily 8) Shingles. This resolved. 9) Chronic Kidney Disease Stage 3. Her creatinine was creatinine 1.60 mg/dL, eGFR 30. She follows with Dr. Ariana Jara. 10) Elevated ALK. Her  (previously 283). While ALK could be an inflammatory marker surrogate, it may also represent bony turnover or liver disease. This was due to bony metastasis 11) Metastatic Breast Cancer. She follows with Dr. Lana Shipley and is on Arimidex and Ibrance. The patient voiced understanding of the aforementioned assessment and plan. Summary of plan was provided in the After Visit Summary patient instructions. TODAY'S ORDERS None Future Appointments Date Time Provider Prasanna Diana 4/12/2019  4:00 PM Katie Harrell  [address]  
5/30/2019  2:20 PM Vidya Eldridge MD [address] [facility] Galen Cowden, MD, [facility] Adult Rheumatology Rheumatology Ultrasound Certified [facility], [address] [place] [address] Phone [phone] Fax [phone]

## 2019-03-12 ENCOUNTER — HOSPITAL ENCOUNTER (OUTPATIENT)
Dept: MRI IMAGING | Age: 84
Discharge: HOME | End: 2019-03-12
Attending: INTERNAL MEDICINE
Payer: MEDICARE

## 2019-03-12 VITALS — WEIGHT: 123 LBS | BODY MASS INDEX: 20.47 KG/M2

## 2019-03-12 DIAGNOSIS — G44.52 NEW DAILY PERSISTENT HEADACHE: ICD-10-CM

## 2019-03-12 PROCEDURE — 70553 MRI BRAIN STEM W/O & W/DYE: CPT

## 2019-03-12 PROCEDURE — 74011250636 HC RX REV CODE- 250/636: Performed by: INTERNAL MEDICINE

## 2019-03-12 PROCEDURE — A9575 INJ GADOTERATE MEGLUMI 0.1ML: HCPCS | Performed by: INTERNAL MEDICINE

## 2019-03-12 RX ORDER — GADOTERATE MEGLUMINE 376.9 MG/ML
10 INJECTION INTRAVENOUS ONCE
Status: COMPLETED | OUTPATIENT
Start: 2019-03-12 | End: 2019-03-12

## 2019-03-12 RX ADMIN — GADOTERATE MEGLUMINE 10 ML: 376.9 INJECTION INTRAVENOUS at 17:10

## 2019-03-24 DIAGNOSIS — I10 ESSENTIAL HYPERTENSION: ICD-10-CM

## 2019-03-24 DIAGNOSIS — I50.21 ACUTE SYSTOLIC HEART FAILURE (HCC): ICD-10-CM

## 2019-03-25 RX ORDER — CARVEDILOL 6.25 MG/1
TABLET ORAL
Qty: 60 TAB | Refills: 5 | Status: SHIPPED | OUTPATIENT
Start: 2019-03-25 | End: 2019-04-12

## 2019-03-25 NOTE — TELEPHONE ENCOUNTER
Requested Prescriptions     Signed Prescriptions Disp Refills    carvedilol (COREG) 6.25 mg tablet 60 Tab 5     Sig: TAKE ONE TABLET BY MOUTH TWICE A DAY WITH MEALS     Authorizing Provider: Israel Galan     Ordering User: Mildred Patel    Per Dr. Paradise Haynes verbal orders

## 2019-04-12 ENCOUNTER — OFFICE VISIT (OUTPATIENT)
Dept: CARDIOLOGY CLINIC | Age: 84
End: 2019-04-12

## 2019-04-12 VITALS
SYSTOLIC BLOOD PRESSURE: 136 MMHG | DIASTOLIC BLOOD PRESSURE: 50 MMHG | HEIGHT: 65 IN | HEART RATE: 73 BPM | OXYGEN SATURATION: 99 % | BODY MASS INDEX: 20.99 KG/M2 | WEIGHT: 126 LBS | RESPIRATION RATE: 14 BRPM

## 2019-04-12 DIAGNOSIS — C50.919 METASTATIC BREAST CANCER (HCC): ICD-10-CM

## 2019-04-12 DIAGNOSIS — I25.10 CORONARY ARTERY DISEASE INVOLVING NATIVE CORONARY ARTERY OF NATIVE HEART WITHOUT ANGINA PECTORIS: Primary | ICD-10-CM

## 2019-04-12 DIAGNOSIS — Z78.9 STATIN INTOLERANCE: ICD-10-CM

## 2019-04-12 DIAGNOSIS — I25.5 ISCHEMIC CARDIOMYOPATHY: ICD-10-CM

## 2019-04-12 DIAGNOSIS — I50.21 ACUTE SYSTOLIC HEART FAILURE (HCC): ICD-10-CM

## 2019-04-12 DIAGNOSIS — I10 ESSENTIAL HYPERTENSION: ICD-10-CM

## 2019-04-12 RX ORDER — CARVEDILOL 12.5 MG/1
12.5 TABLET ORAL 2 TIMES DAILY
Qty: 60 TAB | Refills: 5 | Status: SHIPPED | OUTPATIENT
Start: 2019-04-12 | End: 2019-01-01 | Stop reason: SDUPTHER

## 2019-04-12 NOTE — PROGRESS NOTES
HISTORY OF PRESENT ILLNESS  Fleet Area is a 82 y.o. female     SUMMARY:   Problem List  Date Reviewed: 4/11/2019          Codes Class Noted    Type 2 diabetes with nephropathy ([facility] ICD-10-CM: E11.21  ICD-9-CM: 250.40, 583.81  8/20/2018        Acute respiratory failure ([place] [address].) ICD-10-CM: J96.00  ICD-9-CM: 518.81  6/21/2018        Elevated troponin ICD-10-CM: R74.8  ICD-9-CM: 790.6  6/21/2018        CAD (coronary artery disease) ICD-10-CM: I25.10  ICD-9-CM: 414.00  6/21/2018        Gout flare ICD-10-CM: M10.9  ICD-9-CM: 274.01  6/21/2018        Hyperglycemia due to type 2 diabetes mellitus ([place] [address].) ICD-10-CM: E11.65  ICD-9-CM: 250.00  6/21/2018        Metastatic breast cancer ([place] [address].) ICD-10-CM: C50.919  ICD-9-CM: 174.9  6/1/2018        Goals of care, counseling/discussion ICD-10-CM: Z71.89  ICD-9-CM: V65.49  6/1/2018        Acute on chronic systolic HF (heart failure) ([facility]) ICD-10-CM: I50.23  ICD-9-CM: 428.23  5/19/2018        Acute systolic heart failure ([place] [address] ICD-10-CM: I50.21  ICD-9-CM: 428.21  4/24/2018        Altered mental status, unspecified ICD-10-CM: R41.82  ICD-9-CM: 780.97  4/23/2018        CKD (chronic kidney disease) stage 3, GFR 30-59 ml/min ([facility]) ICD-10-CM: N18.3  ICD-9-CM: 585.3  10/25/2017        Vitamin D deficiency ICD-10-CM: E55.9  ICD-9-CM: 268.9  6/16/2017        Asymptomatic hyperuricemia ICD-10-CM: E79.0  ICD-9-CM: 790.6  2/16/2017        Statin intolerance ICD-10-CM: Z78.9  ICD-9-CM: 995.27  12/21/2016        Long-term use of immunosuppressant medication ICD-10-CM: Z79.899  ICD-9-CM: V58.69  11/21/2016        Seropositive rheumatoid arthritis of multiple sites [facility]) ICD-10-CM: M05.79  ICD-9-CM: 714.0  9/28/2016        Primary osteoarthritis of both knees ICD-10-CM: M17.0  ICD-9-CM: 715.16  9/28/2016        Occlusion and stenosis of carotid artery without mention of cerebral infarction ICD-10-CM: I65.29  ICD-9-CM: 433.10  8/23/2013        Weakness due to cerebrovascular accident ICD-10-CM: [id number]  ICD-9-CM: Everlina Gastelum  4/2/2012        Neuropathy in diabetes [facility]) ICD-10-CM: E11.40  ICD-9-CM: 250.60, 357.2  4/2/2012        PAD (peripheral artery disease) ([facility] ICD-10-CM: I73.9  ICD-9-CM: 443.9  9/7/2011        Carotid bruit ICD-10-CM: R09.89  ICD-9-CM: 785.9  8/31/2011        Claudication ([facility] ICD-10-CM: I73.9  ICD-9-CM: 443.9  8/31/2011        Weakness of left arm ICD-10-CM: R29.898  ICD-9-CM: 729.89  8/31/2011        Hyperlipidemia ICD-10-CM: E78.5  ICD-9-CM: 272.4  1/27/2010        DM (diabetes mellitus) ([facility] ICD-10-CM: E11.9  ICD-9-CM: 250.00  9/2/2009        Hypothyroid ICD-10-CM: E03.9  ICD-9-CM: 244.9  9/2/2009        Colon cancer ([facility] ICD-10-CM: C18.9  ICD-9-CM: 153.9  9/2/2009        H/O: CVA ICD-9-CM: V12.54  9/2/2009        Microalbuminuria ICD-10-CM: R80.9  ICD-9-CM: 791.0  9/2/2009        Age-related osteoporosis without current pathological fracture ICD-10-CM: M81.0  ICD-9-CM: 733.01  9/2/2009        Essential hypertension ICD-10-CM: I10  ICD-9-CM: 401.9  9/2/2009        Anemia ICD-10-CM: D64.9  ICD-9-CM: 285.9  9/2/2009              Current Outpatient Medications on File Prior to Visit   Medication Sig    acetaminophen (TYLENOL) 325 mg tablet Take 2 Tabs by mouth every four (4) hours as needed.  carvedilol (COREG) 6.25 mg tablet TAKE ONE TABLET BY MOUTH TWICE A DAY WITH MEALS    insulin aspart U-100 (NOVOLOG U-100 INSULIN ASPART) 100 unit/mL injection by SubCUTAneous route. Sliding scale    insulin degludec (TRESIBA FLEXTOUCH U-100) 100 unit/mL (3 mL) inpn 10 Units by SubCUTAneous route daily.  denosumab (XGEVA SC) by SubCUTAneous route every thirty (30) days.  epoetin celio (PROCRIT) 10,000 unit/mL injection by SubCUTAneous route once.  Unsure of dosage    REPATHA SURECLICK pen injection INJECT 1 ML SUBCUTANEOUS EVERY 14 DAYS    FORTEO 20 mcg/dose - 600 mcg/2.4 mL pnij injection INJECT 20 MCG UNDER THE SKIN EVERY DAY    clopidogrel (PLAVIX) 75 mg tab Take 1 Tab by mouth daily.    furosemide (LASIX) 40 mg tablet Take 2 Tabs by mouth daily. (Patient taking differently: Take 40 mg by mouth daily. Takes 40mg alternating with 80mg every other day)    magnesium hydroxide (CORONA MILK OF MAGNESIA) 400 mg/5 mL suspension Take 30 mL by mouth daily as needed for Constipation.  palbociclib (IBRANCE) 125 mg cap Take  by mouth daily. Indications: 21 days on medication, 7 days off medication    b-complex with vitamin c tablet Take 1 Tab by mouth daily.  nitroglycerin (NITROSTAT) 0.4 mg SL tablet 1 Tab by SubLINGual route as needed for Chest Pain. Up to 3 doses.  anastrozole (ARIMIDEX) 1 mg tablet Take 1 Tab by mouth daily.  polyethylene glycol (MIRALAX) 17 gram packet Take 1 Packet by mouth daily.  levothyroxine (SYNTHROID) 88 mcg tablet Take 88 mcg by mouth Daily (before breakfast).  folic acid (FOLVITE) 1 mg tablet TAKE ONE TABLET BY MOUTH DAILY    aspirin delayed-release 81 mg tablet Take 81 mg by mouth daily.  OMEGA-3 FATTY ACIDS/FISH OIL (OMEGA 3 FISH OIL PO) Take 300 mg by mouth daily.  CHOLECALCIFEROL, VITAMIN D3, (VITAMIN D-3 PO) Take 1,000 Units by mouth daily. No current facility-administered medications on file prior to visit. CARDIOLOGY STUDIES TO DATE:  6/18 echo lvef 30%, trivial pericardial effusion      Chief Complaint   Patient presents with    Cardiomyopathy     HPI :  Ms. Roger Butts comes in a little early for her appointment because for the last week or ten days, she has had systolic blood pressures in the low 140s and high 130s, which has worried her. She has also had a little bit of dependent lower extremity edema, but that being said, she spends almost all of her time sitting, either in a wheelchair or a chair, though she does try to elevate her legs when she can. She has not had any shortness of breath or chest pain.            CARDIAC ROS:   negative for palpitations, syncope, orthopnea, paroxysmal nocturnal dyspnea, exertional chest pressure/discomfort, claudication    Family History   Problem Relation Age of Onset    Diabetes Mother    [facility] Stroke Mother     Diabetes Sister     Other Sister         fell and hit her head -  of this   [facility] Diabetes Brother     Cancer Father         stomach    Diabetes Sister        Past Medical History:   Diagnosis Date    Anemia 2009    Breast cancer ([address]     CHF (congestive heart failure) ([address]     Colon cancer ([address] 2009    surgery/chemo    Colon cancer ([address] 2009    DM (diabetes mellitus) ([address] 2009    GERD (gastroesophageal reflux disease)     H/O: CVA 2009    slight l sided weakness    Heart attack ([address]     Hypothyroid 2009    Ill-defined condition     seasonal allergies    Microalbuminuria 2009    Osteoporosis 2009    Other and unspecified hyperlipidemia 2010    Rheumatoid arthritis involving ankle ([address] 2016    Rheumatoid arthritis(714.0) 2009    Unspecified essential hypertension 2009    Weakness due to cerebrovascular accident        GENERAL ROS:  A comprehensive review of systems was negative except for that written in the HPI.     Visit Vitals  /50 (BP 1 Location: Left arm, BP Patient Position: Sitting)   Pulse 73   Resp 14   Ht 5' 5\" (1.651 m)   Wt 126 lb (57.2 kg)   SpO2 99%   BMI 20.97 kg/m²       Wt Readings from Last 3 Encounters:   19 126 lb (57.2 kg)   19 123 lb (55.8 kg)   19 120 lb (54.4 kg)            BP Readings from Last 3 Encounters:   19 136/50   19 125/69   19 132/60       PHYSICAL EXAM  General appearance: alert, cooperative, no distress, appears stated age  Neurologic: Alert and oriented X 3  Neck: supple, symmetrical, trachea midline, no adenopathy, no carotid bruit and no JVD  Lungs: clear to auscultation bilaterally  Heart: regular rate and rhythm, S1, S2 normal, no S3 or S4, systolic murmur: early systolic 2/6, crescendo at 2nd right intercostal space  Extremities: edema tr    Lab Results   Component Value Date/Time    Cholesterol, total 74 04/16/2018 04:21 AM    Cholesterol, total 206 (H) 02/07/2017 09:07 AM    Cholesterol, total 135 11/23/2012 09:09 AM    Cholesterol, total 101 11/30/2011 08:30 AM    Cholesterol, total 147 07/25/2011 08:44 AM    HDL Cholesterol 25 04/16/2018 04:21 AM    HDL Cholesterol 36 (L) 02/07/2017 09:07 AM    HDL Cholesterol 42 11/23/2012 09:09 AM    HDL Cholesterol 31 (L) 11/30/2011 08:30 AM    HDL Cholesterol 45 07/25/2011 08:44 AM    LDL, calculated 31.6 04/16/2018 04:21 AM    LDL, calculated 128 (H) 02/07/2017 09:07 AM    LDL, calculated 74 11/23/2012 09:09 AM    LDL, calculated 55 11/30/2011 08:30 AM    LDL, calculated 83 07/25/2011 08:44 AM    Triglyceride 87 04/16/2018 04:21 AM    Triglyceride 209 (H) 02/07/2017 09:07 AM    Triglyceride 95 11/23/2012 09:09 AM    Triglyceride 74 11/30/2011 08:30 AM    Triglyceride 93 07/25/2011 08:44 AM    CHOL/HDL Ratio 3.0 04/16/2018 04:21 AM    CHOL/HDL Ratio 3.2 06/17/2010 09:33 AM    CHOL/HDL Ratio 2.8 02/12/2009 08:19 AM     ASSESSMENT  It used to be that Ms. Ledesma's blood pressure ran low all the time, so this is not necessarily bad, but given her concerns and her cardiomyopathy, we are going to go ahead and double her Carvedilol and we gave her written instructions in that regard. I do not think we should increase her Lasix given the fact that her chest is clear and she is not complaining of shortness of breath and we will just use compression hose to try to control that aspect of things. current treatment plan is effective, no change in therapy  lab results and schedule of future lab studies reviewed with patient  reviewed diet, exercise and weight control    Encounter Diagnoses   Name Primary?     Coronary artery disease involving native coronary artery of native heart without angina pectoris Yes    Essential hypertension     Statin intolerance     [facility]ast[facility]     Ischemic cardiomyopathy      No orders of the defined types were placed in this encounter. Follow-up and Dispositions    · Return in about 3 months (around 7/12/2019).          Brigid Sanchez MD  4/12/2019

## 2019-04-12 NOTE — PATIENT INSTRUCTIONS
Your carvedilol is increased from 6.25 mg twice daily to 12.5 mg twice daily. A prescription was sent to [facility]. You can double up on what you have. You can try [address] [place] [address] for compression stockings.   KARIN GomesΕΑ [place], [address] [facility]  [phone]

## 2019-05-05 DIAGNOSIS — I25.10 CORONARY ARTERY DISEASE INVOLVING NATIVE CORONARY ARTERY, ANGINA PRESENCE UNSPECIFIED, UNSPECIFIED WHETHER NATIVE OR TRANSPLANTED HEART: ICD-10-CM

## 2019-05-06 RX ORDER — CLOPIDOGREL BISULFATE 75 MG/1
TABLET ORAL
Qty: 30 TAB | Refills: 11 | Status: ON HOLD | OUTPATIENT
Start: 2019-05-06 | End: 2020-01-01

## 2019-05-06 NOTE — TELEPHONE ENCOUNTER
Requested Prescriptions     Signed Prescriptions Disp Refills    clopidogrel (PLAVIX) 75 mg tab 30 Tab 11     Sig: TAKE ONE TABLET BY MOUTH DAILY     Authorizing Provider: Jose Martin Stallings     Ordering User: Trey Client    Per Dr. Baudilio Lindsay verbal orders

## 2019-05-30 ENCOUNTER — OFFICE VISIT (OUTPATIENT)
Dept: RHEUMATOLOGY | Age: 84
End: 2019-05-30

## 2019-05-30 VITALS
DIASTOLIC BLOOD PRESSURE: 52 MMHG | HEART RATE: 79 BPM | BODY MASS INDEX: 20.83 KG/M2 | TEMPERATURE: 98.1 F | WEIGHT: 125 LBS | RESPIRATION RATE: 18 BRPM | SYSTOLIC BLOOD PRESSURE: 137 MMHG | HEIGHT: 65 IN

## 2019-05-30 DIAGNOSIS — M81.0 AGE-RELATED OSTEOPOROSIS WITHOUT CURRENT PATHOLOGICAL FRACTURE: ICD-10-CM

## 2019-05-30 DIAGNOSIS — M05.79 SEROPOSITIVE RHEUMATOID ARTHRITIS OF MULTIPLE SITES (HCC): Primary | ICD-10-CM

## 2019-05-30 DIAGNOSIS — E79.0 ASYMPTOMATIC HYPERURICEMIA: ICD-10-CM

## 2019-05-30 DIAGNOSIS — C50.919 METASTATIC BREAST CANCER (HCC): ICD-10-CM

## 2019-05-30 DIAGNOSIS — N18.30 CKD (CHRONIC KIDNEY DISEASE) STAGE 3, GFR 30-59 ML/MIN (HCC): ICD-10-CM

## 2019-05-30 RX ORDER — LEFLUNOMIDE 20 MG/1
20 TABLET ORAL DAILY
Qty: 90 TAB | Refills: 0 | Status: SHIPPED | OUTPATIENT
Start: 2019-05-30 | End: 2019-08-08

## 2019-05-30 NOTE — PROGRESS NOTES
Chief Complaint   Patient presents with    Arthritis     1. Have you been to the ER, urgent care clinic since your last visit? Hospitalized since your last visit? No    2. Have you seen or consulted any other health care providers outside of the [address] [place] [address] since your last visit? Include any pap smears or colon screening.  No

## 2019-05-30 NOTE — PROGRESS NOTES
REASON FOR VISIT    This is a follow up for Ms. Ledesma for Seropositive Erosive Nodular Rheumatoid Arthritis and Osteoporosis. Inflammatory arthritis phenotype includes:  Anti-CCP positive: yes (71)  Rheumatoid factor positive: yes (81.8)  Erosive disease: yes  Extra-articular manifestations include: rheumatoid nodules    Immunosuppression Screening (12/20/2017):   Quantiferon TB: negative  PPD:  Not performed  Hepatitis B: negative   Hepatitis C: negative    Therapy History includes:  Current DMARD therapy includes: none  Prior DMARD therapy includes: methotrexate 25 mg subcutaneous, Orencia subcutaneous (3/2017-9/2017), Enbrel 50 mg weekly (10/2017-4/2018)  The following DMARDs have been ineffective: Orencia  The following DMARDs were stopped because of side effects: none  Contra-indicated DMARDs because of chronic kidney disease: methotrexate  Contra-indicated DMARDs because of heart failure: anti-TNF    Osteoporosis Historical Synopsis     Height loss since age 29 (at least two inches): 1.5   Fracture history includes: yes (right radius and ulna, hairline in legs, L3, T10, T12 vertebral fractures)  Family history of hip fracture: no     Daily calcium intake is 1200 mg  Daily vitamin D intake is 1000 IU     Smoking history: no  Alcohol consumption: no  Prednisone history: no     Exercise: yes (walks around the house, core exercises)     Previous work-up for osteoporosis includes the following:  DEXA Scan: 11/23/2016  Vitamin 25OH D level: 67.0 (previously 82.3)  PTH: 19     Therapy History includes:  Current osteoporosis therapy includes: Xgeva (10/2018 to present)  Prior osteoporosis therapy includes: Fosmax, Reino Mic, Forteo (1/19/2017-2/11/2019)  The following osteoporosis have been ineffective: none  The following osteoporosis were stopped because of side effects: none     Immunizations:   Immunization History   Administered Date(s) Administered    (RETIRED) Pneumococcal Vaccine (Unspecified Type) 06/29/2006  Influenza High Dose Vaccine PF 11/01/2016    Influenza Vaccine Split 10/15/2010, 10/01/2011    Influenza Vaccine Whole 10/01/2004     Active problems include:    Patient Active Problem List   Diagnosis Code    DM (diabetes mellitus) ([address] [facility] E11.9    Hypothyroid E03.9    Colon cancer ([address] [facility] C18.9    H/O: CVA     Microalbuminuria R80.9    Age-related osteoporosis without current pathological fracture M81.0    Essential hypertension I10    Anemia D64.9    Hyperlipidemia E78.5    Carotid bruit R09.89    Claudication ([facility]) I73.9    Weakness of left arm R29.898    PAD (peripheral artery disease) ([facility]) I73.9    Weakness due to cerebrovascular accident [id number]    Neuropathy in diabetes ([address] [facility] E11.40    Occlusion and stenosis of carotid artery without mention of cerebral infarction I65.29    Seropositive rheumatoid arthritis of multiple sites ([address] [facility] M05.79    Primary osteoarthritis of both knees M17.0    Long-term use of immunosuppressant medication Z79.899    Statin intolerance Z78.9    Asymptomatic hyperuricemia E79.0    Vitamin D deficiency E55.9    CKD (chronic kidney disease) stage 3, GFR 30-59 ml/min ([facility]) N18.3    Altered mental status, unspecified O98.20    Acute systolic heart failure ([facility]) I50.21    Acute on chronic systolic HF (heart failure) ([facility]) I50.23    Metastatic breast cancer ([facility]) C50.919    Goals of care, counseling/discussion Z71.89    Acute respiratory failure ([facility]) J96.00    Elevated troponin R74.8    CAD (coronary artery disease) I25.10    Gout flare M10.9    Hyperglycemia due to type 2 diabetes mellitus ([facility]) E11.65    Type 2 diabetes with nephropathy ([facility]) E11.21       HISTORY OF PRESENT ILLNESS    Ms. Bud Kelly returns for an acute visit. On her last visit, I continued off treatment due to being asymptomatic. I reviewed Dr. Maximo Goodman 3/15/2019 office notes. Labs summarized below.  He continues Ibrance/Arimidex but noted leukopenia so decided to alternate weeks for Ibrance. He continued Procrit and Xgeva. Today, she complains that her fingers are swollen that is causing a vague discomfort. She has a hard tome making a fist which is constant. She denies pain. She endorses chronic blurred vision, ankle swelling    She denies fever, weight loss, vision loss, oral ulcers, dry cough, dyspnea, nausea, vomiting, dysphagia, abdominal pain, black/bloody stools,, fall since last visit, rash, easy bruising and increased thirst.    Last toxicity monitoring by blood work was done on 3/15/2019 and did not reveal any significant adverse effects, except WBC 3.3, Hct 33.5%, creatinine 1.43 mg/dL, eGFR 34. Most recent inflammatory markers from 9/28/2017 revealed a ESR 18 mm/hr (previously 6, 12 mm/hr) and CRP 1.5 mg/dL (previously 0.7, 1.4 mg/L). The patient has had any interval hospital admissions and surgeries. REVIEW OF SYSTEMS    A comprehensive review of systems was performed and pertinent results are documented in the HPI, review of systems is otherwise non-contributory. PAST MEDICAL HISTORY    She has a past medical history of Anemia (9/2/2009), Breast cancer ([facility] [address] CHF (congestive heart failure) ([facility] [address] Colon cancer ([facility] [address] (9/2/2009), Colon cancer ([facility] [address] (9/2/2009), DM (diabetes mellitus) ([facility] [address] (9/2/2009), GERD (gastroesophageal reflux disease), H/O: CVA (9/2/2009), Heart attack ([address] Hypothyroid (9/2/2009), Ill-defined condition, Microalbuminuria (9/2/2009), Osteoporosis (9/2/2009), Other and unspecified hyperlipidemia (1/27/2010), Rheumatoid arthritis involving ankle ([address] (9/28/2016), Rheumatoid arthritis(714.0) (9/2/2009), Unspecified essential hypertension (9/2/2009), and Weakness due to cerebrovascular accident.  She also has no past medical history of Abuse, Adult physical abuse, Arrhythmia, Calculus of kidney, Chronic pain, Congestive heart failure, unspecified, Contact dermatitis and other eczema, due to unspecified cause, COPD, Depression, Headache(214.0), Psychotic disorder ([facility] [address] Unspecified adverse effect of anesthesia, or Unspecified sleep apnea. FAMILY HISTORY    Her family history includes Cancer in her father; Diabetes in her brother, mother, sister, and sister; Other in her sister; Stroke in her mother. SOCIAL HISTORY    She reports that she has never smoked. She has never used smokeless tobacco. She reports that she does not drink alcohol or use drugs. MEDICATIONS    Current Outpatient Medications   Medication Sig Dispense Refill    leflunomide (ARAVA) 20 mg tablet Take 1 Tab by mouth daily. Take half tab daily for 7 days and then one tab daily if tolerated 90 Tab 0    clopidogrel (PLAVIX) 75 mg tab TAKE ONE TABLET BY MOUTH DAILY 30 Tab 11    carvedilol (COREG) 12.5 mg tablet Take 1 Tab by mouth two (2) times a day. 60 Tab 5    insulin aspart U-100 (NOVOLOG U-100 INSULIN ASPART) 100 unit/mL injection by SubCUTAneous route. Sliding scale      insulin degludec (TRESIBA FLEXTOUCH U-100) 100 unit/mL (3 mL) inpn 12 Units by SubCUTAneous route daily.  denosumab (XGEVA SC) by SubCUTAneous route every thirty (30) days.  epoetin celio (PROCRIT) 10,000 unit/mL injection by SubCUTAneous route once. Unsure of dosage      REPATHA SURECLICK pen injection INJECT 1 ML SUBCUTANEOUS EVERY 14 DAYS 2 Pen 11    furosemide (LASIX) 40 mg tablet Take 2 Tabs by mouth daily. (Patient taking differently: Take 40 mg by mouth daily. Takes 40mg alternating with 80mg every other day) 60 Tab 5    magnesium hydroxide (CORONA MILK OF MAGNESIA) 400 mg/5 mL suspension Take 30 mL by mouth daily as needed for Constipation.  palbociclib (IBRANCE) 125 mg cap Take  by mouth daily. Indications: 21 days on medication, 7 days off medication      acetaminophen (TYLENOL) 325 mg tablet Take 2 Tabs by mouth every four (4) hours as needed. 10 Tab 0    b-complex with vitamin c tablet Take 1 Tab by mouth daily.  30 Tab 0    nitroglycerin (NITROSTAT) 0.4 mg SL tablet 1 Tab by SubLINGual route as needed for Chest Pain. Up to 3 doses. 40 Tab 0    anastrozole (ARIMIDEX) 1 mg tablet Take 1 Tab by mouth daily. 90 Tab 0    polyethylene glycol (MIRALAX) 17 gram packet Take 1 Packet by mouth daily. 1 Each 0    levothyroxine (SYNTHROID) 88 mcg tablet Take 88 mcg by mouth Daily (before breakfast).  folic acid (FOLVITE) 1 mg tablet TAKE ONE TABLET BY MOUTH DAILY 90 Tab 2    aspirin delayed-release 81 mg tablet Take 81 mg by mouth daily.  OMEGA-3 FATTY ACIDS/FISH OIL (OMEGA 3 FISH OIL PO) Take 300 mg by mouth daily.  CHOLECALCIFEROL, VITAMIN D3, (VITAMIN D-3 PO) Take 1,000 Units by mouth daily. ALLERGIES    Allergies   Allergen Reactions    Statins-Hmg-Coa Reductase Inhibitors Other (comments)     Intolerant to statins     Sulfa (Sulfonamide Antibiotics) Other (comments)     syncope       PHYSICAL EXAMINATION    Visit Vitals  /52   Pulse 79   Temp 98.1 °F (36.7 °C)   Resp 18   Ht 5' 5\" (1.651 m)   Wt 125 lb (56.7 kg)   BMI 20.80 kg/m²     Body mass index is 20.8 kg/m². General: Patient is alert, oriented x 3, not in acute distress, sitting in wheelchair son at bedside    HEENT:   Sclerae are not injected and appear moist.  There is no alopecia. Cardiovascular:  Heart is regular rate and rhythm, no murmurs. Chest:  Lungs are clear to auscultation bilaterally. No rhonchi, wheezes, or crackles. Extremities:  Free of clubbing, cyanosis, +1 non-pitting tender edema on her lower extremities    Neurologic:    Positive Right Tinel's    Skin exam:  There are no rashes, no alopecia, no discoid lesions, no active Raynaud's, no livedo reticularis, no periungual erythema.     Post-inflammatory hyperpigmentation from Pustular herpetic lesions from her left lower back dermatomally with anterior involvement    Musculoskeletal exam:  A comprehensive musculoskeletal exam was performed for all joints of each upper and lower extremity and assessed for swelling, tenderness and range of motion.  Positive results are documented as below:    Bilateral knee crepitus without effusion     Joint Count 5/30/2019 2/18/2019 4/3/2018 12/20/2017 9/28/2017 6/16/2017 2/16/2017   Patient pain (0-100) 70 5 15 30 (No Data) 10 60   MHAQ 2 1.75 1.615 0.75 (No Data) 0.875 2   Left elbow - Tender - - - - - - 1   Left elbow - Swollen - - - - - - 1   Left wrist- Tender - - - - 1 - -   Left wrist- Swollen - - - - - 1 1   Left 1st MCP - Tender - - - - - - -   Left 1st MCP - Swollen - - - - 1 1 1   Left 2nd MCP - Tender - - - - - - -   Left 2nd MCP - Swollen - - - - - - -   Left 3rd MCP - Tender - - - - 1 - -   Left 3rd MCP - Swollen - - - - 1 - 1   Left 4th MCP - Tender - - - - - - -   Left 4th MCP - Swollen - - - - - - -   Left 5th MCP - Tender - - - - - - -   Left 5th MCP - Swollen - - - - - 1 -   Left thumb IP - Tender - - - - - - -   Left thumb IP - Swollen - - - - - - -   Left 2nd PIP - Tender - - - - - - -   Left 2nd PIP - Swollen - - - - - - -   Left 3rd PIP - Tender - - - - - - 1   Left 3rd PIP - Swollen - - - - - - -   Left 4th PIP - Tender - - - - - - -   Left 5th PIP - Tender - - - - - - -   Right shoulder - Tender - - - - 1 - -   Right elbow - Tender - - - - - 1 1   Right elbow - Swollen - - - - - 1 1   Right wrist- Tender 1 1 - 1 1 1 1   Right wrist- Swollen 1 1 1 1 1 1 1   Right 1st MCP - Tender 1 1 1 - 1 1 1   Right 1st MCP - Swollen 1 1 1 - 1 1 1   Right 2nd MCP - Tender 1 1 1 - - - -   Right 2nd MCP - Swollen 1 1 - - - 1 -   Right 3rd MCP - Tender 1 1 1 1 1 1 1   Right 3rd MCP - Swollen 1 1 1 1 1 1 1   Right 4th MCP - Tender 1 1 1 1 1 1 -   Right 4th MCP - Swollen 1 1 1 1 1 1 -   Right 5th MCP - Tender 1 1 1 - 1 1 1   Right 5th MCP - Swollen - 1 1 - 1 1 1   Right thumb IP - Tender 1 1 1 - - 1 -   Right thumb IP - Swollen 0 - - - - - -   Right 2nd PIP - Tender 1 - 1 - - 1 1   Right 2nd PIP - Swollen 0 - 1 - - 1 1   Right 3rd PIP - Tender 1 - 1 - - 1 1   Right 3rd PIP - Swollen 0 - - - - - - Right 4th PIP - Tender 1 - 1 - 1 1 1   Right 4th PIP - Swollen 0 - - - - - -   Right 5th PIP - Tender 1 - - - 1 1 1   Right 5th PIP - Swollen 0 - - - - - -   Tender Joint Count (Total) 11 7 9 3 10 11 11   Swollen Joint Count (Total) 5 6 6 3 7 11 10   Physician Assessment (0-10) 3 2 3 2 4 3 3   Patient Assessment (0-10) 7.5 8.5 4.5 2.5 (No Data) 0.5 5.5   CDAI Total (calculated) 26.5 23.5 22.5 10.5 - 25.5 29.5        DATA REVIEW    Laboratory     Recent laboratory results were reviewed, summarized, and discussed with the patient. Imaging    Musculoskeletal Ultrasound    None    Radiographs    Chest 4/16/2018: normal heart size. Mild pulmonary edema is noted. Degenerative changes are seen in the thoracic spine. Calcified granuloma is seen in the right upper lobe.     Chest 2/09/2018: Cardiac silhouette is enlarged. Pulmonary vasculature is not engorged. There are no focal consolidation, effusions, or pneumothorax. Ossified granuloma right upper lobe. Aorta remains atherosclerotic     Right shoulder 2/09/2018: no fracture, dislocation or other acute abnormality. Bones are osteopenic. Incidental granuloma right upper lobe    Lumbar 2/09/2018: superior endplate compression of L3 with approximately 50% loss of vertebral body height. Superior endplate compression of T12 with approximately one third loss of vertebral body height. Slight compression of T10. Osteopenia. Degenerative changes throughout the spine. Vascular calcifications      Chest 9/28/2016: normal heart size. Is atherosclerotic change of the aorta. The lungs are clear acute process. There is calcified granuloma in the right upper lobe with calcified right hilar lymph nodes. There is moderate compression deformity of approximately the T12 vertebral body that appears chronic. There are degenerative changes of the thoracic spine. Bilateral Hand 9/28/2016: LEFT: Bones are osteopenic. No acute fracture or dislocation.  Moderate radiocarpal joint osteoarthritis. Age-appropriate intercarpal and first [facility] joint osteoarthritis. There are erosions at the third MCP joint. No other erosion. No other joint space narrowing. No soft tissue calcification.  RIGHT: No acute fracture. Old radius intra-articular fracture has healed. Increased now moderate radiocarpal joint osteoarthritis. Increased mild first MCP joint osteoarthritis. Increased mild-moderate first IP joint osteoarthritis. No joint space erosion or periosteal reaction. Alignment is within normal limits. Osteopenia is unchanged. No soft tissue calcification. Bilateral Foot 9/28/2016: LEFT: Bones are osteopenic. No acute fracture or dislocation. No erosion. Age-appropriate bilateral first MTP joint osteoarthritis. Minimal intertarsal osteoarthritis for age. No periosteal reaction or soft tissue calcification. RIGHT: Bones are osteopenic. No acute fracture or dislocation. No erosion. Age-appropriate bilateral first MTP joint osteoarthritis. Minimal intertarsal osteoarthritis for age. No periosteal reaction or soft tissue calcification. Right Hand 4/10/2010: showed demonstrate osteopenia. Jodell Betty is an acute T-shaped intra-articular distal radial fracture. Ulna styloid peri like avulsion is also noted. There is soft tissue swelling    CT Imaging    CT Head, Abdomen, and Pelvis without contrast 4/24/2018: The patient's arms at her sides cause scatter artifact over the examination. Left breast carcinoma: A spiculated left breast mass at 6:00 in the posterior third extends to the dermal-epidermal junction, and probably to the pectoralis muscle. It measures approximately 21 x 32 x 27 mm, and enhanced more avidly than other tissue in the left breast or any tissue in the right breast on prior MRI. There is probably more extensive disease throughout the left breast. Left breast skin thickening is associated. There is diffuse, mixed lucent and sclerotic, osseous metastatic disease.  There are pathologic fractures of T12 and L3. Though no displaced fracture is visible, the degree of tumor replacement of the bilateral sacral wings makes occult or impending pathologic fracture likely. There are multiple bilateral pathologic rib fractures. The posterior cortices of T10, T11, and L1 are largely destroyed. No definite epidural extension on this unenhanced CT. Inferior chest: Moderate bilateral pleural effusions are associated with subtotal collapse of the bilateral lower lobes. Interlobular septal thickening is consistent with interstitial edema. The heart is mildly enlarged, and three-vessel coronary artery calcifications are severe. Aortic valvular calcifications are moderate. Calcified hilar lymph nodes, and splenic and hepatic calcifications, are consistent with old granulomatous disease. Abdomen: Unenhanced CT is neither sensitive nor specific for pyelonephritis, which is manifest on cross-sectional imaging by hazy perfusion defects. No gross perfusion defects on motion limited MRI from 4/20/2018. There is a large left lower pole renal calculus; no definite calyceal dilation upstream. Other bilateral renal calculi are parenchymal and vascular, rather than urinary, in origin. The unenhanced distal esophagus, stomach, duodenum, gallbladder, pancreas, and adrenals are normal. Pelvis: A large stool ball is impacted in the rectum. Surrounding presacral and mesorectal edema suggest stroke or a proctitis. A Alford catheter is in place in the bladder. The unenhanced small bowel and proximal colon are normal. There is trace ascites. No free air or abdominopelvic lymphadenopathy. CT Head without contrast 2/09/2018: There is diffuse age-related parenchymal volume loss. The ventricles and sulci are age-appropriate without hydrocephalus. There is no mass effect or midline shift. There is no intracranial hemorrhage or extra-axial fluid collection.  Scattered foci of low attenuation in the periventricular white matter most likely represent age-indeterminate microvascular ischemic changes. The gray-white matter differentiation is maintained. The basal cisterns are patent. The osseous structures are intact. There is diffuse paranasal sinus opacification with aerated secretions and fluid levels in the maxillary and sphenoid sinuses. The mastoid air cells are clear. MR Imaging    MRI Abdomen with and without contrast 4/20/2018: MRCP: No pancreatic or biliary ductal dilation. No stricture or choledocholithiasis. LIVER: Suboptimal postcontrast imaging secondary to motion. 2 subcentimeter T2 hyperintense foci are noted in the right lobe, most likely representing tiny cysts. There are multiple suspicious lesions seen on the diffusion weighted sequences in the right and left hepatic lobes. The largest of these measures approximately 16mm. Many of these have subtle associated T2 hyperintense signal. Metastatic disease is suspected. GALLBLADDER: Contracted. No gallstones. PANCREAS: Normal. SPLEEN: Normal. ADRENALS: Normal. KIDNEYS: Normal. DISTAL ESOPHAGUS: Normal. STOMACH AND DUODENUM: Normal. VISUALIZED SMALL BOWEL AND COLON: Normal. PERITONEUM: No free fluid and no abdominal lymphadenopathy. VISUALIZED LUNG BASES: Small bilateral pleural effusions are noted with associated atelectasis/consolidation in both lower lobes. BONES: Widespread osseous metastases are noted. CHEST WALL: There is a spiculated mass in the central aspect of the left breast measuring 3.5 x 3.2 x 2.8 cm. A smaller discrete mass measuring 0.9 x 1.3 cm seen medially in the left breast. Multiple other areas of concerning areas of enhancement are noted in the left breast, not well assessed on the current study. MRI Brain with and without contrast 3/19/2018: EXTRA-AXIAL SPACES: Prominent cortical sulci, consistent with cerebral volume loss. Prominence of the sylvian fissures and basal cisterns. INTRACRANIAL HEMORRHAGE: None.  VENTRICULAR SYSTEM:  Normal in size and morphology for the patient's age. BASAL CISTERNS:  Normal. CEREBRAL PARENCHYMA:  Extensive scattered and confluent foci of FLAIR/T2 hyperintensity in the cerebral white matter, most likely due to intracranial small vessel disease. No enhancing lesions. No evidence of diffusion restriction. MIDLINE SHIFT: None. CEREBELLUM:  Inferior vermian hypoplasia. Mild volume loss. BRAINSTEM:  Diffusely atrophic. Chronic bilateral pontine lacunar infarcts, right more extensive than left. CALVARIUM:  Multiple scattered T1 hypointense foci in the calvarium, suspicious for metastatic disease. Not identified as destructive lesions on recent head CT. VASCULAR SYSTEM:  Normal flow voids. PARANASAL SINUSES AND MASTOID AIR CELLS:  Clear. VISUALIZED ORBITS: Previous bilateral cataract surgery. VISUALIZED UPPER CERVICAL SPINE:  Normal. SELLA: Normal. SKULL BASE: Normal. No cranial nerve thickening or abnormal enhancement, but some images compromised by patient motion. DXA     DXA 11/23/2016: (L3 for compression fracture and L4 for spondylitic change) lumbar spine L1-L2 T score -1.7 (BMD 0.971 g/cm2), right femoral neck T score: -2.0 (0.757 g/cm2), righttotal hip T score: -1.7 (0.791 g/cm2), and distal one third left radius T score -4.6 (0.472 g/cm2). FRAX score 33.2 % probability in 10 years for major osteoporotic fracture and 21 % 10 year probability of hip fracture. Echocardiogram    Echocardiogram 6/01/2018: LEFT VENTRICLE: Size was normal. Systolic function was moderately to markedly reduced. Ejection fraction was estimated to be 30 %. No obvious wall motion abnormalities identified in the views obtained. RIGHT VENTRICLE: The size was normal. Systolic function was normal. LEFT ATRIUM: Size was normal. RIGHT ATRIUM: Size was normal. MITRAL VALVE: Normal valve structure. AORTIC VALVE: Normal valve structure. TRICUSPID VALVE: Normal valve structure. PULMONIC VALVE: Not well visualized. AORTA: The root exhibited normal size.  PERICARDIUM: A small pericardial effusion was identified circumferential to the heart. There was no evidence of hemodynamic compromise. Echocardiogram 8/23/2013: Left ventricle: Systolic function was normal. Ejection fraction was estimated in the range of 55 % to 60 %. There were no regional wall motion abnormalities. Doppler parameters were consistent with abnormal left ventricular relaxation (grade 1 diastolic dysfunction). Left atrium: The atrium was moderately dilated. Atrial septum: There was a probable patent foramen ovale. Right atrium: The atrium was mildly dilated. Mitral valve: There was mild annular calcification. Aortic valve: The valve was not visualized well enough to rule out a bicuspid morphology. Transaortic velocity was increased due to increased transvalvular flow. Tricuspid valve: There was mild regurgitation. Nuclear Medicine    Triple Phase Bone Scan 4/20/2018: There are numerous skeletal foci of activity compatible with metastases. There are multiple in the skull as well as throughout the spine, sternum, ribs, shoulders, pelvis and femora. PATHOLOGY    Left breast, core biopsies 4/23/2018: Sclerosed and necrotic papillary lesion with focal areas concerning for invasive carcinoma. Estrogen and Progesterone positive    ASSESSMENT AND PLAN    This is a follow-up visit for Ms. Ledesma. 1) Seropositive Erosive Nodular Rheumatoid Arthritis. She is off treatment due metastatic breast cancer. She is currently receiving Ibrance/Arimidex without worsening of her Rheumatoid Arthritis. She feels joint stiffness but denies pain. She is also on Arimidex but does not feel worse while on either of these agents and off methotrexate and Enbrel. Her CDAI was 26.5 (previously 23.5, 22.5, 10.5, 25.5, 29.5, 23, 47, 23) with 11 tender and 5 swollen joints, consistent with high disease activity. Most of her symptoms appear to be neuropathy. I discussed rituximab an option but her son was concerned about the long half life. I would avoid methotrexate due to her chronic kidney disease. I would avoid anti-TNFs due to her heart failure. I discussed initiation with the DMARD Arava (leflunomide). I informed the patient the potential adverse effects, which may include: nausea, vomiting, dyspepsia, diarrhea, oral ulcers, infection, liver function abnormalities, blood count abnormalities, paresthesia, rash, and rarely melanoma and non-melanoma skin cancer. The patient understood these possible adverse effects. I informed the patient of the need for routine CBC and CMP as a measure for long term use of immunosuppressants. I also instructed the patient to avoid ill contacts and the needs for annual influenza vaccines and the pneumonia vaccines. In childbearing patients, pregnancy is contra-indicated on leflunomide due to risk for birth defects. I informed the patient that leflunomide may take 4 to 12 weeks to be effective. I prescribed the patient Arava (leflunomide) 20 mg, and instructed to start with half a tablet (10 mg) daily for 7 days and then increase to a full tablet (20 mg) if she has no side effects, such as diarrhea or upset stomach. The patient will follow up in 4 weeks for laboratory monitoring. 2) Long Term Use of Immunosuppressants. The patient remains off immunomodulatory medications. 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 She is taking calcium 1200 mg and 1000 of vitamin D daily. She has completed two months of Forteo and is now on Xgeva due to breast cancer and bony metastasis.    4) Bilateral Knee Osteoarthritis. This was not an active issue. 5) Abnormal Gait. She is dependent on a walker to ambulate. There are no recent falls. 6) Asymptomatic Hyperuricemia. Her uric acid was 11.7  mg/dL (previously 8.7, 7.2 mg/dL). There is no history of gout. 7) Vitamin D Deficiency. Her level was 67.0 (previously 82.3, 59.4). She is on 1000 daily      8) Elevated ALK. Her  (previously 283).  While ALK could be an inflammatory marker surrogate, it may also represent bony turnover or liver disease. This was due to bony metastasis    9) Chronic Kidney Disease Stage 3. Her creatinine was 1.43 mg/dL, eGFR 34. She follows with Dr. Keshawn Gutierrez. 10) Peripheral Neuropathy. This is an active issue. 11) Metastatic Breast Cancer. She follows with Dr. Lisa Thomas and is on Arimidex and Ibrance. The patient voiced understanding of the aforementioned assessment and plan. Summary of plan was provided in the After Visit Summary patient instructions.      TODAY'S ORDERS    Orders Placed This Encounter    leflunomide (ARAVA) 20 mg tablet       Future Appointments   Date Time Provider [facility] [place]   7/19/2019 11:40 AM Trenton Robles  [address]   8/22/2019  3:00 PM MD Ponce Jones MD, [address] [place] [address]    Adult Rheumatology   Rheumatology Ultrasound Certified  [id number] [facility] [address]  [facility], [address] [place] [address]   Phone [phone]  Fax [phone]

## 2019-06-03 ENCOUNTER — TELEPHONE (OUTPATIENT)
Dept: RHEUMATOLOGY | Age: 84
End: 2019-06-03

## 2019-06-03 NOTE — TELEPHONE ENCOUNTER
Spoke to son, Mr. Zoë Kessler, and discussed my converation with Dr. Fish Sebastian this morning regarding potential leukopenia of leflunomide and/or rituximab. Dr. Fish Sebastian preferred RTX. I explained that I had preferred RTX but Robin had preferred leflunomide. I explained that I had one patient that developed rituximab induced neutropenia. After our discussion on the phone, Mr. Kelly was amenable for rituximab 1000 mg Day 0 and Day 14 every 6 months. No leflunomide. I informed Dr. Fish Sebastian who will submit the order at his [facility].

## 2019-06-24 ENCOUNTER — TELEPHONE (OUTPATIENT)
Dept: CARDIOLOGY CLINIC | Age: 84
End: 2019-06-24

## 2019-06-24 DIAGNOSIS — I50.21 ACUTE SYSTOLIC HEART FAILURE (HCC): ICD-10-CM

## 2019-06-24 DIAGNOSIS — I10 ESSENTIAL HYPERTENSION: ICD-10-CM

## 2019-06-24 NOTE — TELEPHONE ENCOUNTER
I called pt to rs 7/19/19 appt and got in touch with her son Bessy Carmona. The pts son had a few questions, so her asked that I send a message to the nurse. The pts son would like to know if Augustine Franklin is due for any labs? The pt has labs drawn at the [facility] every Friday and would like us to take a look at those labs to see if they are sufficent for what we look for in our labs. If there are any additional labs he would like to know if they can be added on this Friday and the [address] [place] [address] so that she does not have to get stuck multiple times. The pt rs the appt for 7/2/19. The doctors name at the [address] [place] [address] is Dr. Elder Winchester his number is [phone]. Call back # [phone] for pt.

## 2019-06-24 NOTE — TELEPHONE ENCOUNTER
Called Robin and left message on voicemail stating I do not think patient needs to get labs prior to appointment and if anything was needed it could be added on to labs the next time she gets them drawn. I stated he can call back with any other questions.

## 2019-06-25 RX ORDER — FUROSEMIDE 40 MG/1
40 TABLET ORAL DAILY
Qty: 135 TAB | Refills: 1 | Status: SHIPPED | OUTPATIENT
Start: 2019-06-25 | End: 2019-01-01

## 2019-06-25 NOTE — TELEPHONE ENCOUNTER
Requested Prescriptions     Signed Prescriptions Disp Refills    furosemide (LASIX) 40 mg tablet 135 Tab 1     Sig: Take 1 Tab by mouth daily.  Takes 40mg alternating with 80mg every other day or as advised by physician     Authorizing Provider: Deidra Peña     Ordering User: Sterling Stroud    Per Dr. Dannielle Yoon verbal orders

## 2019-07-02 ENCOUNTER — OFFICE VISIT (OUTPATIENT)
Dept: CARDIOLOGY CLINIC | Age: 84
End: 2019-07-02

## 2019-07-02 VITALS
SYSTOLIC BLOOD PRESSURE: 122 MMHG | DIASTOLIC BLOOD PRESSURE: 56 MMHG | HEART RATE: 67 BPM | WEIGHT: 127 LBS | OXYGEN SATURATION: 98 % | BODY MASS INDEX: 21.16 KG/M2 | RESPIRATION RATE: 16 BRPM | HEIGHT: 65 IN

## 2019-07-02 DIAGNOSIS — I25.5 ISCHEMIC CARDIOMYOPATHY: ICD-10-CM

## 2019-07-02 DIAGNOSIS — E78.2 MIXED HYPERLIPIDEMIA: ICD-10-CM

## 2019-07-02 DIAGNOSIS — I25.10 CORONARY ARTERY DISEASE INVOLVING NATIVE CORONARY ARTERY OF NATIVE HEART WITHOUT ANGINA PECTORIS: Primary | ICD-10-CM

## 2019-07-02 DIAGNOSIS — Z78.9 STATIN INTOLERANCE: ICD-10-CM

## 2019-07-02 NOTE — PROGRESS NOTES
1. Have you been to the ER, urgent care clinic since your last visit? Hospitalized since your last visit? No 
 
2. Have you seen or consulted any other health care providers outside of the [address] [place] [address] since your last visit? Include any pap smears or colon screening. No  
 
Patient reports Med rec. Completed. Visit Vitals /56 (BP 1 Location: Right arm, BP Patient Position: Sitting) Pulse 67 Resp 16 Ht 5' 5\" (1.651 m) Wt 127 lb (57.6 kg) SpO2 98% BMI 21.13 kg/m²

## 2019-07-02 NOTE — PROGRESS NOTES
HISTORY OF PRESENT ILLNESS Elina Cespedes is a 82 y.o. female SUMMARY:  
Problem List  Date Reviewed: 7/2/2019 Codes Class Noted Type 2 diabetes with nephropathy ([facility] ICD-10-CM: E11.21 
ICD-9-CM: 250.40, 583.81  8/20/2018 Acute respiratory failure ([facility] ICD-10-CM: J96.00 
ICD-9-CM: 518.81  6/21/2018 Elevated troponin ICD-10-CM: R74.8 ICD-9-CM: 790.6  6/21/2018 CAD (coronary artery disease) ICD-10-CM: I25.10 ICD-9-CM: 414.00  6/21/2018 Gout flare ICD-10-CM: M10.9 ICD-9-CM: 274.01  6/21/2018 Hyperglycemia due to type 2 diabetes mellitus ([facility] ICD-10-CM: E11.65 ICD-9-CM: 250.00  6/21/2018 Metastatic breast cancer ([facility] ICD-10-CM: L09.463 ICD-9-CM: 174.9  6/1/2018 Goals of care, counseling/discussion ICD-10-CM: Z71.89 ICD-9-CM: V65.49  6/1/2018 Acute on chronic systolic HF (heart failure) (HCC) ICD-10-CM: F09.90 ICD-9-CM: 428.23  5/19/2018 Acute systolic heart failure (HCC) ICD-10-CM: I50.21 ICD-9-CM: 428.21  4/24/2018 Altered mental status, unspecified ICD-10-CM: R41.82 
ICD-9-CM: 780.97  4/23/2018 CKD (chronic kidney disease) stage 3, GFR 30-59 ml/min (HCC) ICD-10-CM: N18.3 ICD-9-CM: 585.3  10/25/2017 Vitamin D deficiency ICD-10-CM: E55.9 ICD-9-CM: 268.9  6/16/2017 Asymptomatic hyperuricemia ICD-10-CM: E79.0 ICD-9-CM: 790.6  2/16/2017 Statin intolerance ICD-10-CM: Z78.9 ICD-9-CM: 995.27  12/21/2016 Long-term use of immunosuppressant medication ICD-10-CM: Z79.899 ICD-9-CM: V58.69  11/21/2016 Seropositive rheumatoid arthritis of multiple sites [facility]) ICD-10-CM: M05.79 ICD-9-CM: 714.0  9/28/2016 Primary osteoarthritis of both knees ICD-10-CM: M17.0 ICD-9-CM: 715.16  9/28/2016 Occlusion and stenosis of carotid artery without mention of cerebral infarction ICD-10-CM: I65.29 ICD-9-CM: 433.10  8/23/2013 Weakness due to cerebrovascular accident ICD-10-CM: EWJ5992 ICD-9-CM: XND4087  4/2/2012 Neuropathy in diabetes [facility]) ICD-10-CM: E11.40 ICD-9-CM: 250.60, 357.2  4/2/2012 PAD (peripheral artery disease) ([facility]) ICD-10-CM: I73.9 ICD-9-CM: 443.9  9/7/2011 Carotid bruit ICD-10-CM: R09.89 ICD-9-CM: 785.9  8/31/2011 Claudication [facility]) ICD-10-CM: I73.9 ICD-9-CM: 443.9  8/31/2011 Weakness of left arm ICD-10-CM: R29.898 ICD-9-CM: 729.89  8/31/2011 Hyperlipidemia ICD-10-CM: E78.5 ICD-9-CM: 272.4  1/27/2010 DM (diabetes mellitus) ([facility] ICD-10-CM: E11.9 ICD-9-CM: 250.00  9/2/2009 Hypothyroid ICD-10-CM: E03.9 ICD-9-CM: 244.9  9/2/2009 Colon cancer [facility]) ICD-10-CM: C18.9 ICD-9-CM: 153.9  9/2/2009 H/O: CVA ICD-9-CM: V12.54  9/2/2009 Microalbuminuria ICD-10-CM: R80.9 ICD-9-CM: 791.0  9/2/2009 Age-related osteoporosis without current pathological fracture ICD-10-CM: M81.0 ICD-9-CM: 733.01  9/2/2009 Essential hypertension ICD-10-CM: I10 
ICD-9-CM: 401.9  9/2/2009 Anemia ICD-10-CM: D64.9 ICD-9-CM: 285.9  9/2/2009 Current Outpatient Medications on File Prior to Visit Medication Sig  furosemide (LASIX) 40 mg tablet Take 1 Tab by mouth daily. Takes 40mg alternating with 80mg every other day or as advised by physician  leflunomide (ARAVA) 20 mg tablet Take 1 Tab by mouth daily. Take half tab daily for 7 days and then one tab daily if tolerated  clopidogrel (PLAVIX) 75 mg tab TAKE ONE TABLET BY MOUTH DAILY  carvedilol (COREG) 12.5 mg tablet Take 1 Tab by mouth two (2) times a day.  insulin aspart U-100 (NOVOLOG U-100 INSULIN ASPART) 100 unit/mL injection by SubCUTAneous route. Sliding scale  insulin degludec (TRESIBA FLEXTOUCH U-100) 100 unit/mL (3 mL) inpn 12 Units by SubCUTAneous route daily.  denosumab (XGEVA SC) by SubCUTAneous route every thirty (30) days.  epoetin celio (PROCRIT) 10,000 unit/mL injection by SubCUTAneous route once. Unsure of dosage  REPATHA SURECLICK pen injection INJECT 1 ML SUBCUTANEOUS EVERY 14 DAYS  palbociclib (IBRANCE) 125 mg cap Take  by mouth daily. Indications: 21 days on medication, 7 days off medication  acetaminophen (TYLENOL) 325 mg tablet Take 2 Tabs by mouth every four (4) hours as needed.  b-complex with vitamin c tablet Take 1 Tab by mouth daily.  nitroglycerin (NITROSTAT) 0.4 mg SL tablet 1 Tab by SubLINGual route as needed for Chest Pain. Up to 3 doses.  anastrozole (ARIMIDEX) 1 mg tablet Take 1 Tab by mouth daily.  polyethylene glycol (MIRALAX) 17 gram packet Take 1 Packet by mouth daily.  levothyroxine (SYNTHROID) 88 mcg tablet Take 88 mcg by mouth Daily (before breakfast).  folic acid (FOLVITE) 1 mg tablet TAKE ONE TABLET BY MOUTH DAILY  aspirin delayed-release 81 mg tablet Take 81 mg by mouth daily.  OMEGA-3 FATTY ACIDS/FISH OIL (OMEGA 3 FISH OIL PO) Take 300 mg by mouth daily.  CHOLECALCIFEROL, VITAMIN D3, (VITAMIN D-3 PO) Take 1,000 Units by mouth daily. No current facility-administered medications on file prior to visit. CARDIOLOGY STUDIES TO DATE: 
6/18 echo lvef 30%, trivial pericardial effusion Chief Complaint Patient presents with  Cardiomyopathy HPI : 
 is doing remarkably well. Looking back, she gained about six pounds over the last six months but this has been slow and gradual and she has no signs or symptoms of volume overload. She apparently started on some new chemo recently and that caused her sugars to go out of control and she had some arthralgias and transient numbness. She is essentially wheelchair bound. CARDIAC ROS:  
negative for chest pain, dyspnea, palpitations, syncope, orthopnea, paroxysmal nocturnal dyspnea, exertional chest pressure/discomfort, claudication, lower extremity edema Family History Problem Relation Age of Onset  Diabetes Mother  Stroke Mother  Diabetes Sister  Other Sister   
     fell and hit her head -  of this  Diabetes Brother  Cancer Father   
     stomach  Diabetes Sister Past Medical History:  
Diagnosis Date  Anemia 2009  Breast cancer ([facility]  CHF (congestive heart failure) ([facility]  Colon cancer ([facility] 2009  
 surgery/chemo  Colon cancer ([facility] 2009  DM (diabetes mellitus) ([facility] 2009  GERD (gastroesophageal reflux disease)  H/O: CVA 2009  
 slight l sided weakness  Heart attack ([address]  Hypothyroid 2009  Ill-defined condition   
 seasonal allergies  Microalbuminuria 2009  Osteoporosis 2009  Other and unspecified hyperlipidemia 2010  Rheumatoid arthritis involving ankle ([facility] 2016  Rheumatoid arthritis(714.0) 2009  Unspecified essential hypertension 2009  Weakness due to cerebrovascular accident GENERAL ROS: 
A comprehensive review of systems was negative except for that written in the HPI. Visit Vitals /56 (BP 1 Location: Right arm, BP Patient Position: Sitting) Pulse 67 Resp 16 Ht 5' 5\" (1.651 m) Wt 127 lb (57.6 kg) SpO2 98% BMI 21.13 kg/m² Wt Readings from Last 3 Encounters:  
19 127 lb (57.6 kg) 19 125 lb (56.7 kg) 19 126 lb (57.2 kg) BP Readings from Last 3 Encounters:  
19 122/56  
19 137/52  
19 136/50 PHYSICAL EXAM 
General appearance: alert, cooperative, no distress, appears stated age Neurologic: Alert and oriented X 3 Neck: supple, symmetrical, trachea midline, no adenopathy, no carotid bruit and no JVD Lungs: clear to auscultation bilaterally Heart: regular rate and rhythm, S1, S2 normal, no murmur, click, rub or gallop Extremities: extremities normal, atraumatic, no cyanosis or edema Lab Results Component Value Date/Time  Cholesterol, total 74 2018 04:21 AM  
 Cholesterol, total 206 (H) 02/07/2017 09:07 AM  
 Cholesterol, total 135 11/23/2012 09:09 AM  
 Cholesterol, total 101 11/30/2011 08:30 AM  
 Cholesterol, total 147 07/25/2011 08:44 AM  
 HDL Cholesterol 25 04/16/2018 04:21 AM  
 HDL Cholesterol 36 (L) 02/07/2017 09:07 AM  
 HDL Cholesterol 42 11/23/2012 09:09 AM  
 HDL Cholesterol 31 (L) 11/30/2011 08:30 AM  
 HDL Cholesterol 45 07/25/2011 08:44 AM  
 LDL, calculated 31.6 04/16/2018 04:21 AM  
 LDL, calculated 128 (H) 02/07/2017 09:07 AM  
 LDL, calculated 74 11/23/2012 09:09 AM  
 LDL, calculated 55 11/30/2011 08:30 AM  
 LDL, calculated 83 07/25/2011 08:44 AM  
 Triglyceride 87 04/16/2018 04:21 AM  
 Triglyceride 209 (H) 02/07/2017 09:07 AM  
 Triglyceride 95 11/23/2012 09:09 AM  
 Triglyceride 74 11/30/2011 08:30 AM  
 Triglyceride 93 07/25/2011 08:44 AM  
 CHOL/HDL Ratio 3.0 04/16/2018 04:21 AM  
 CHOL/HDL Ratio 3.2 06/17/2010 09:33 AM  
 CHOL/HDL Ratio 2.8 02/12/2009 08:19 AM  
 
ASSESSMENT She is stable, asymptomatic and well compensated on a good medical regimen and needs no cardiac testing at this time. current treatment plan is effective, no change in therapy 
lab results and schedule of future lab studies reviewed with patient 
reviewed diet, exercise and weight control Encounter Diagnoses Name Primary?  Coronary artery disease involving native coronary artery of native heart without angina pectoris Yes  Mixed hyperlipidemia  Statin intolerance  Ischemic cardiomyopathy No orders of the defined types were placed in this encounter. Follow-up and Dispositions · Return in about 4 months (around 11/2/2019). Anshu Brantley III, MD7/2/2019

## 2019-07-08 ENCOUNTER — TELEPHONE (OUTPATIENT)
Dept: RHEUMATOLOGY | Age: 84
End: 2019-07-08

## 2019-07-08 NOTE — TELEPHONE ENCOUNTER
----- Message from Nic Jaquez sent at 7/5/2019  5:00 PM EDT -----  Regarding: Dr. Gisselle Ledesma(pt's son) is requesting a call back from Dr. Aracely Leo or nurse in reference to pt's medication. Experiencing tingling, numbness in hands & feet for a few days.     Best contact [phone]

## 2019-07-09 NOTE — TELEPHONE ENCOUNTER
Spoke with pt's son regarding the tingling/numbness she is experiencing in her hands and feet for the last few days, and I asked him if she was taking leflunomide? He was not sure at the present time if she was taking it or not, but he thinks that she may be. He stated that the numbness/tingling has gotten a little better but is still present. He is going to check with the aid that takes care of her today and see if they are giving her that medication. If they are giving it to her I instructed him to tell them to stop that medication now that she is receiving the Rituxan infusions. He stated an understanding and will call back if the numbness/tingling does not go away.

## 2019-08-08 ENCOUNTER — OFFICE VISIT (OUTPATIENT)
Dept: FAMILY MEDICINE CLINIC | Age: 84
End: 2019-08-08

## 2019-08-08 VITALS
DIASTOLIC BLOOD PRESSURE: 54 MMHG | HEART RATE: 69 BPM | RESPIRATION RATE: 18 BRPM | WEIGHT: 129.4 LBS | BODY MASS INDEX: 21.56 KG/M2 | OXYGEN SATURATION: 97 % | TEMPERATURE: 98.4 F | HEIGHT: 65 IN | SYSTOLIC BLOOD PRESSURE: 120 MMHG

## 2019-08-08 DIAGNOSIS — Z79.4 TYPE 2 DIABETES MELLITUS WITHOUT COMPLICATION, WITH LONG-TERM CURRENT USE OF INSULIN (HCC): ICD-10-CM

## 2019-08-08 DIAGNOSIS — I50.23 ACUTE ON CHRONIC SYSTOLIC HF (HEART FAILURE) (HCC): ICD-10-CM

## 2019-08-08 DIAGNOSIS — M05.79 SEROPOSITIVE RHEUMATOID ARTHRITIS OF MULTIPLE SITES (HCC): ICD-10-CM

## 2019-08-08 DIAGNOSIS — R26.81 GAIT INSTABILITY: ICD-10-CM

## 2019-08-08 DIAGNOSIS — E11.9 TYPE 2 DIABETES MELLITUS WITHOUT COMPLICATION, WITH LONG-TERM CURRENT USE OF INSULIN (HCC): ICD-10-CM

## 2019-08-08 DIAGNOSIS — C50.919 METASTATIC BREAST CANCER (HCC): ICD-10-CM

## 2019-08-08 DIAGNOSIS — E11.21 TYPE 2 DIABETES WITH NEPHROPATHY (HCC): ICD-10-CM

## 2019-08-08 DIAGNOSIS — I73.9 PAD (PERIPHERAL ARTERY DISEASE) (HCC): ICD-10-CM

## 2019-08-08 DIAGNOSIS — Z00.00 ENCOUNTER FOR MEDICARE ANNUAL WELLNESS EXAM: Primary | ICD-10-CM

## 2019-08-08 DIAGNOSIS — N18.30 CKD (CHRONIC KIDNEY DISEASE) STAGE 3, GFR 30-59 ML/MIN (HCC): ICD-10-CM

## 2019-08-08 DIAGNOSIS — C18.9 MALIGNANT NEOPLASM OF COLON, UNSPECIFIED PART OF COLON (HCC): ICD-10-CM

## 2019-08-08 PROBLEM — J96.00 ACUTE RESPIRATORY FAILURE (HCC): Status: RESOLVED | Noted: 2018-06-21 | Resolved: 2019-08-08

## 2019-08-08 PROBLEM — Z71.89 GOALS OF CARE, COUNSELING/DISCUSSION: Status: RESOLVED | Noted: 2018-06-01 | Resolved: 2019-08-08

## 2019-08-08 PROBLEM — M10.9 GOUT FLARE: Status: RESOLVED | Noted: 2018-06-21 | Resolved: 2019-08-08

## 2019-08-08 PROBLEM — E11.65 HYPERGLYCEMIA DUE TO TYPE 2 DIABETES MELLITUS (HCC): Status: RESOLVED | Noted: 2018-06-21 | Resolved: 2019-08-08

## 2019-08-08 PROBLEM — R77.8 ELEVATED TROPONIN: Status: RESOLVED | Noted: 2018-06-21 | Resolved: 2019-08-08

## 2019-08-08 PROBLEM — R41.82 ALTERED MENTAL STATUS, UNSPECIFIED: Status: RESOLVED | Noted: 2018-04-23 | Resolved: 2019-08-08

## 2019-08-08 PROBLEM — I50.21 ACUTE SYSTOLIC HEART FAILURE (HCC): Status: RESOLVED | Noted: 2018-04-24 | Resolved: 2019-08-08

## 2019-08-08 NOTE — ACP (ADVANCE CARE PLANNING)
Advance Care Planning:  
Patient was offered the opportunity to discuss advance care planning:  yes Does patient have an Advance Directive:  no If no, did you provide information on [facility]? yes Conducted ACP discussion today. Advanced Care Planning Information Card copy provided to patient; he/she will reach out to NN/facilitator to review.

## 2019-08-08 NOTE — ASSESSMENT & PLAN NOTE
This condition is managed by Specialist. 
Key Oncology Meds   
    
  
 palbociclib [place]) 125 mg cap (Taking) Take  by mouth daily. Indications: 21 days on medication, 7 days off medication  
 anastrozole (ARIMIDEX) 1 mg tablet (Taking) Take 1 Tab by mouth daily. Key Pain Meds   
    
  
 acetaminophen (TYLENOL) 325 mg tablet (Taking) Take 2 Tabs by mouth every four (4) hours as needed. Lab Results Component Value Date/Time WBC 3.3 07/14/2018 06:16 PM  
 ABS.  NEUTROPHILS 1.2 07/14/2018 06:16 PM  
 HGB 10.8 07/14/2018 06:16 PM  
 HCT 34.6 07/14/2018 06:16 PM  
 PLATELET 106 40/85/9391 06:16 PM  
 Creatinine 1.43 07/14/2018 06:16 PM  
 BUN 43 07/14/2018 06:16 PM  
 ALT (SGPT) 21 07/14/2018 06:16 PM  
 AST (SGOT) 21 07/14/2018 06:16 PM  
 Albumin 2.8 07/14/2018 06:16 PM

## 2019-08-08 NOTE — PROGRESS NOTES
This is the Subsequent Medicare Annual Wellness Exam, performed 12 months or more after the Initial AWV or the last Subsequent AWV I have reviewed the patient's medical history in detail and updated the computerized patient record. History Past Medical History:  
Diagnosis Date  Acute on chronic systolic HF (heart failure) ([address] 5/19/2018  Age-related osteoporosis without current pathological fracture 9/2/2009  Anemia 9/2/2009  Asymptomatic hyperuricemia 2/16/2017  Breast cancer ([address]  CAD (coronary artery disease) 6/21/2018  Carotid bruit 8/31/2011  CHF (congestive heart failure) ([address]  CKD (chronic kidney disease) stage 3, GFR 30-59 ml/min ([facility]) 10/25/2017  Colon cancer ([address] 9/2/2009  
 surgery/chemo  Colon cancer ([address] 9/2/2009  DM (diabetes mellitus) ([address] 9/2/2009  GERD (gastroesophageal reflux disease)  H/O: CVA 9/2/2009  
 slight l sided weakness  Heart attack ([address]  HTN, goal below 140/90 9/2/2009  Hypothyroid 9/2/2009  Ill-defined condition   
 seasonal allergies  Long-term use of immunosuppressant medication 11/21/2016  Metastatic breast cancer ([address] 6/1/2018  Microalbuminuria 9/2/2009  Osteoporosis 9/2/2009  Other and unspecified hyperlipidemia 1/27/2010  PAD (peripheral artery disease) ([address] 9/7/2011  Primary osteoarthritis of both knees 9/28/2016  Rheumatoid arthritis involving ankle ([address] 9/28/2016  Rheumatoid arthritis(714.0) 9/2/2009  Seropositive rheumatoid arthritis of multiple sites ([address] 9/28/2016  Statin intolerance 12/21/2016  Type 2 diabetes mellitus with neurologic complication, with long-term current use of insulin ([address] 9/2/2009  Type 2 diabetes with nephropathy ([address] 8/20/2018  Unspecified essential hypertension 9/2/2009  Vitamin D deficiency 6/16/2017  Weakness due to cerebrovascular accident Past Surgical History:  
Procedure Laterality Date  HX COLECTOMY  HX HYSTERECTOMY  HX OTHER SURGICAL    
 colonoscopies numerous since 1993 Current Outpatient Medications Medication Sig Dispense Refill  furosemide (LASIX) 40 mg tablet Take 1 Tab by mouth daily. Takes 40mg alternating with 80mg every other day or as advised by physician 135 Tab 1  clopidogrel (PLAVIX) 75 mg tab TAKE ONE TABLET BY MOUTH DAILY 30 Tab 11  
 carvedilol (COREG) 12.5 mg tablet Take 1 Tab by mouth two (2) times a day. 60 Tab 5  
 insulin aspart U-100 (NOVOLOG U-100 INSULIN ASPART) 100 unit/mL injection by SubCUTAneous route. Sliding scale   Indications: usually needs 2 to 3 units at dinner  insulin degludec (TRESIBA FLEXTOUCH U-100) 100 unit/mL (3 mL) inpn 14 Units by SubCUTAneous route daily.  denosumab (XGEVA SC) by SubCUTAneous route every thirty (30) days.  epoetin celio (PROCRIT) 10,000 unit/mL injection by SubCUTAneous route once. Unsure of dosage  REPATHA SURECLICK pen injection INJECT 1 ML SUBCUTANEOUS EVERY 14 DAYS 2 Pen 11  
 palbociclib (IBRANCE) 125 mg cap Take  by mouth daily. Indications: 21 days on medication, 7 days off medication  acetaminophen (TYLENOL) 325 mg tablet Take 2 Tabs by mouth every four (4) hours as needed. 10 Tab 0  
 b-complex with vitamin c tablet Take 1 Tab by mouth daily. 30 Tab 0  
 nitroglycerin (NITROSTAT) 0.4 mg SL tablet 1 Tab by SubLINGual route as needed for Chest Pain. Up to 3 doses. 40 Tab 0  
 anastrozole (ARIMIDEX) 1 mg tablet Take 1 Tab by mouth daily. 90 Tab 0  
 polyethylene glycol (MIRALAX) 17 gram packet Take 1 Packet by mouth daily. 1 Each 0  
 levothyroxine (SYNTHROID) 88 mcg tablet Take 88 mcg by mouth Daily (before breakfast).  aspirin delayed-release 81 mg tablet Take 81 mg by mouth daily.  OMEGA-3 FATTY ACIDS/FISH OIL (OMEGA 3 FISH OIL PO) Take 300 mg by mouth daily.  CHOLECALCIFEROL, VITAMIN D3, (VITAMIN D-3 PO) Take 1,000 Units by mouth daily. Allergies Allergen Reactions  Statins-Hmg-Coa Reductase Inhibitors Other (comments) Intolerant to statins  Sulfa (Sulfonamide Antibiotics) Other (comments)  
  syncope Family History Problem Relation Age of Onset  Diabetes Mother  Stroke Mother  Diabetes Sister  Other Sister   
     fell and hit her head -  of this  Diabetes Brother  Cancer Father   
     stomach  Diabetes Sister Social History Tobacco Use  Smoking status: Never Smoker  Smokeless tobacco: Never Used Substance Use Topics  Alcohol use: No  
 
Patient Active Problem List  
Diagnosis Code  Type 2 diabetes mellitus with neurologic complication, with long-term current use of insulin ([facility]) E11.49, Z79.4  Colon cancer ([facility] C18.9  Age-related osteoporosis without current pathological fracture M81.0  
 HTN, goal below 140/90 I10  Anemia D64.9  Hyperlipidemia E78.5  Carotid bruit R09.89  
 PAD (peripheral artery disease) ([facility]) I73.9  Weakness due to cerebrovascular accident [id number]  Seropositive rheumatoid arthritis of multiple sites ([facility] M05.79  
 Primary osteoarthritis of both knees M17.0  Long-term use of immunosuppressant medication Z79.899  Statin intolerance Z78.9  Asymptomatic hyperuricemia E79.0  Vitamin D deficiency E55.9  CKD (chronic kidney disease) stage 3, GFR 30-59 ml/min ([facility]) N18.3  Acute on chronic systolic HF (heart failure) ([facility]) I50.23  
 Metastatic breast cancer ([facility] C50.919  
 CAD (coronary artery disease) I25.10  Type 2 diabetes with nephropathy ([facility]) E11.21 Depression Risk Factor Screening:  
 
3 most recent PHQ Screens 2019 Little interest or pleasure in doing things Not at all Feeling down, depressed, irritable, or hopeless Not at all Total Score PHQ 2 0 Alcohol Risk Factor Screening: You do not drink alcohol or very rarely. Functional Ability and Level of Safety:  
Hearing Loss Hearing is good. Activities of Daily Living The home contains: handrails and grab bars Patient needs help with:  transportation, shopping, laundry, housework, managing medications and managing money Fall Risk Fall Risk Assessment, last 12 mths 8/8/2019 Able to walk? Yes Fall in past 12 months? No  
 
 
Abuse Screen Patient is not abused Cognitive Screening Evaluation of Cognitive Function: 
Has your family/caregiver stated any concerns about your memory: no 
 
 
Patient Care Team  
Patient Care Team: 
Shannon Blackmon MD as PCP - [facility]) Brayan Artis MD (Cardiology) Theresa Kunz MD as Physician (Hematology and Oncology) Pedro Edge MD as Physician (Nephrology) Min Camacho MD as Physician (Endocrinology) Khushboo Begum MD (Pulmonary Disease) Assessment/Plan Education and counseling provided: 
Are appropriate based on today's review and evaluation Diagnoses and all orders for this visit: 1. Encounter for Medicare annual wellness exam 
 
2. Gait instability 
-     REFERRAL TO PHYSICAL THERAPY 3. Type 2 diabetes mellitus without complication, with long-term current use of insulin ([facility] Assessment & Plan: This condition is managed by Specialist. 
Key Antihyperglycemic Medications   
    
  
 insulin aspart U-100 (NOVOLOG U-100 INSULIN ASPART) 100 unit/mL injection (Taking) by SubCUTAneous route. Sliding scale   Indications: usually needs 2 to 3 units at dinner  
 insulin degludec (TRESIBA FLEXTOUCH U-100) 100 unit/mL (3 mL) inpn (Taking) 14 Units by SubCUTAneous route daily. Other Key Diabetic Medications OMEGA-3 FATTY ACIDS/FISH OIL (OMEGA 3 FISH OIL PO) (Taking) Take 300 mg by mouth daily. Lab Results Component Value Date/Time  Hemoglobin A1c 8.0 07/02/2018 09:40 AM  
 Glucose 213 07/14/2018 06:16 PM  
 Creatinine 1.43 07/14/2018 06:16 PM  
 Cholesterol, total 74 04/16/2018 04:21 AM  
 HDL Cholesterol 25 04/16/2018 04:21 AM  
 LDL, calculated 31.6 04/16/2018 04:21 AM  
 Triglyceride 87 04/16/2018 04:21 AM  
 
Diabetic Foot and Eye Exam HM Status Topic Date Due  
 Diabetic Foot Care  02/21/1945  Eye Exam  02/21/1945  
 
 
 
4. Malignant neoplasm of colon, unspecified part of colon ([facility] Assessment & Plan: This condition is managed by Specialist. 
Key Oncology Meds   
    
  
 palbociclib Charlean Rhein) 125 mg cap (Taking) Take  by mouth daily. Indications: 21 days on medication, 7 days off medication  
 anastrozole (ARIMIDEX) 1 mg tablet (Taking) Take 1 Tab by mouth daily. Key Pain Meds   
    
  
 acetaminophen (TYLENOL) 325 mg tablet (Taking) Take 2 Tabs by mouth every four (4) hours as needed. Lab Results Component Value Date/Time WBC 3.3 07/14/2018 06:16 PM  
 ABS. NEUTROPHILS 1.2 07/14/2018 06:16 PM  
 HGB 10.8 07/14/2018 06:16 PM  
 HCT 34.6 07/14/2018 06:16 PM  
 PLATELET 372 04/33/2808 06:16 PM  
 Creatinine 1.43 07/14/2018 06:16 PM  
 BUN 43 07/14/2018 06:16 PM  
 ALT (SGPT) 21 07/14/2018 06:16 PM  
 AST (SGOT) 21 07/14/2018 06:16 PM  
 Albumin 2.8 07/14/2018 06:16 PM  
 
 
 
5. PAD (peripheral artery disease) ([facility] Assessment & Plan: This condition is managed by Specialist. 
Key Peripheral Vascular Disease Meds   
    
  
 clopidogrel (PLAVIX) 75 mg tab (Taking) TAKE ONE TABLET BY MOUTH DAILY  
 aspirin delayed-release 81 mg tablet (Taking) Take 81 mg by mouth daily. OMEGA-3 FATTY ACIDS/FISH OIL (OMEGA 3 FISH OIL PO) (Taking) Take 300 mg by mouth daily. Lab Results Component Value Date/Time  WBC 3.3 07/14/2018 06:16 PM  
 HGB 10.8 07/14/2018 06:16 PM  
 HCT 34.6 07/14/2018 06:16 PM  
 PLATELET 576 26/47/6344 06:16 PM  
 Creatinine 1.43 07/14/2018 06:16 PM  
 BUN 43 07/14/2018 06:16 PM  
 INR 1.1 05/19/2018 03:10 AM  
 Prothrombin time 11.4 05/19/2018 03:10 AM  
 Cholesterol, total 74 04/16/2018 04:21 AM  
 HDL Cholesterol 25 04/16/2018 04:21 AM  
 LDL, calculated 31.6 04/16/2018 04:21 AM  
 Triglyceride 87 04/16/2018 04:21 AM  
 
 
 
6. Seropositive rheumatoid arthritis of multiple sites ([facility] Assessment & Plan: 
 
Lab Results Component Value Date/Time WBC 3.3 07/14/2018 06:16 PM  
 HGB 10.8 07/14/2018 06:16 PM  
 HCT 34.6 07/14/2018 06:16 PM  
 PLATELET 066 24/36/6561 06:16 PM  
 Creatinine 1.43 07/14/2018 06:16 PM  
 BUN 43 07/14/2018 06:16 PM  
 Potassium 3.7 07/14/2018 06:16 PM  
 INR 1.1 05/19/2018 03:10 AM  
 Prothrombin time 11.4 05/19/2018 03:10 AM  
 
 
 
7. CKD (chronic kidney disease) stage 3, GFR 30-59 ml/min ([facility]) Assessment & Plan: This condition is managed by Specialist.  
Key CKD Meds   
    
  
 furosemide (LASIX) 40 mg tablet (Taking) Take 1 Tab by mouth daily. Takes 40mg alternating with 80mg every other day or as advised by physician  
 epoetin celio (PROCRIT) 10,000 unit/mL injection (Taking) by SubCUTAneous route once. Unsure of dosage CHOLECALCIFEROL, VITAMIN D3, (VITAMIN D-3 PO) (Taking) Take 1,000 Units by mouth daily. Lab Results Component Value Date/Time GFR est non-AA 35 07/14/2018 06:16 PM  
 GFR est AA 43 07/14/2018 06:16 PM  
 Creatinine 1.43 07/14/2018 06:16 PM  
 BUN 43 07/14/2018 06:16 PM  
 Sodium 135 07/14/2018 06:16 PM  
 Potassium 3.7 07/14/2018 06:16 PM  
 Chloride 98 07/14/2018 06:16 PM  
 CO2 27 07/14/2018 06:16 PM  
 Calcium 8.6 07/14/2018 06:16 PM  
 Magnesium 1.5 07/06/2018 08:36 AM  
 Phosphorus 3.8 06/20/2018 03:00 AM  
 
CrCl cannot be calculated (Patient's most recent lab result is older than the maximum 180 days allowed. ). 8. Acute on chronic systolic HF (heart failure) ([facility] Assessment & Plan: This condition is managed by Specialist. 
Key CAD CHF Meds   
    
  
 furosemide (LASIX) 40 mg tablet (Taking) Take 1 Tab by mouth daily. Takes 40mg alternating with 80mg every other day or as advised by physician  
 clopidogrel (PLAVIX) 75 mg tab (Taking) TAKE ONE TABLET BY MOUTH DAILY carvedilol (COREG) 12.5 mg tablet (Taking) Take 1 Tab by mouth two (2) times a day. nitroglycerin (NITROSTAT) 0.4 mg SL tablet (Taking) 1 Tab by SubLINGual route as needed for Chest Pain. Up to 3 doses. aspirin delayed-release 81 mg tablet (Taking) Take 81 mg by mouth daily. OMEGA-3 FATTY ACIDS/FISH OIL (OMEGA 3 FISH OIL PO) (Taking) Take 300 mg by mouth daily. Lab Results Component Value Date/Time Sodium 135 07/14/2018 06:16 PM  
 Potassium 3.7 07/14/2018 06:16 PM  
 Cholesterol, total 74 04/16/2018 04:21 AM  
 HDL Cholesterol 25 04/16/2018 04:21 AM  
 LDL, calculated 31.6 04/16/2018 04:21 AM  
 Triglyceride 87 04/16/2018 04:21 AM  
 INR 1.1 05/19/2018 03:10 AM  
 Prothrombin time 11.4 05/19/2018 03:10 AM  
 
 
 
9. Metastatic breast cancer ([facility] Assessment & Plan: This condition is managed by Specialist. 
Key Oncology Meds   
    
  
 palbociclib Zuleika Senior) 125 mg cap (Taking) Take  by mouth daily. Indications: 21 days on medication, 7 days off medication  
 anastrozole (ARIMIDEX) 1 mg tablet (Taking) Take 1 Tab by mouth daily. Key Pain Meds   
    
  
 acetaminophen (TYLENOL) 325 mg tablet (Taking) Take 2 Tabs by mouth every four (4) hours as needed. Lab Results Component Value Date/Time WBC 3.3 07/14/2018 06:16 PM  
 ABS. NEUTROPHILS 1.2 07/14/2018 06:16 PM  
 HGB 10.8 07/14/2018 06:16 PM  
 HCT 34.6 07/14/2018 06:16 PM  
 PLATELET 409 91/61/9390 06:16 PM  
 Creatinine 1.43 07/14/2018 06:16 PM  
 BUN 43 07/14/2018 06:16 PM  
 ALT (SGPT) 21 07/14/2018 06:16 PM  
 AST (SGOT) 21 07/14/2018 06:16 PM  
 Albumin 2.8 07/14/2018 06:16 PM  
 
 
 
10. Type 2 diabetes with nephropathy ([facility]) Assessment & Plan: This condition is managed by Specialist. 
Key Antihyperglycemic Medications   
    
  
 insulin aspart U-100 (NOVOLOG U-100 INSULIN ASPART) 100 unit/mL injection (Taking) by SubCUTAneous route. Sliding scale   Indications: usually needs 2 to 3 units at dinner insulin degludec (TRESIBA FLEXTOUCH U-100) 100 unit/mL (3 mL) inpn (Taking) 14 Units by SubCUTAneous route daily. Other Key Diabetic Medications OMEGA-3 FATTY ACIDS/FISH OIL (OMEGA 3 FISH OIL PO) (Taking) Take 300 mg by mouth daily. Lab Results Component Value Date/Time Hemoglobin A1c 8.0 07/02/2018 09:40 AM  
 Glucose 213 07/14/2018 06:16 PM  
 Creatinine 1.43 07/14/2018 06:16 PM  
 Cholesterol, total 74 04/16/2018 04:21 AM  
 HDL Cholesterol 25 04/16/2018 04:21 AM  
 LDL, calculated 31.6 04/16/2018 04:21 AM  
 Triglyceride 87 04/16/2018 04:21 AM  
 
Diabetic Foot and Eye Exam HM Status Topic Date Due  
 Diabetic Foot Care  02/21/1945 [facility] Eye Exam  02/21/1945 Health Maintenance Due Topic Date Due  
 FOOT EXAM Q1  02/21/1945  
 EYE EXAM RETINAL OR DILATED  02/21/1945  
 DTaP/Tdap/Td series (1 - Tdap) 02/21/1958  Shingrix Vaccine Age 50> (1 of 2) 02/21/1987  GLAUCOMA SCREENING Q2Y  02/21/2002  Pneumococcal 65+ years (1 of 2 - PCV13) 06/29/2007  BREAST CANCER SCRN MAMMOGRAM  05/19/2012  COLONOSCOPY  04/20/2016  MICROALBUMIN Q1  03/17/2017  MEDICARE YEARLY EXAM  03/28/2018  LIPID PANEL Q1  04/16/2019  
 HEMOGLOBIN A1C Q6M  07/05/2019  Influenza Age 5 to Adult  08/01/2019

## 2019-08-08 NOTE — ASSESSMENT & PLAN NOTE
This condition is managed by Specialist. 
Key Peripheral Vascular Disease Meds   
    
  
 clopidogrel (PLAVIX) 75 mg tab (Taking) TAKE ONE TABLET BY MOUTH DAILY  
 aspirin delayed-release 81 mg tablet (Taking) Take 81 mg by mouth daily. OMEGA-3 FATTY ACIDS/FISH OIL (OMEGA 3 FISH OIL PO) (Taking) Take 300 mg by mouth daily. Lab Results Component Value Date/Time  WBC 3.3 07/14/2018 06:16 PM  
 HGB 10.8 07/14/2018 06:16 PM  
 HCT 34.6 07/14/2018 06:16 PM  
 PLATELET 777 53/35/3098 06:16 PM  
 Creatinine 1.43 07/14/2018 06:16 PM  
 BUN 43 07/14/2018 06:16 PM  
 INR 1.1 05/19/2018 03:10 AM  
 Prothrombin time 11.4 05/19/2018 03:10 AM  
 Cholesterol, total 74 04/16/2018 04:21 AM  
 HDL Cholesterol 25 04/16/2018 04:21 AM  
 LDL, calculated 31.6 04/16/2018 04:21 AM  
 Triglyceride 87 04/16/2018 04:21 AM

## 2019-08-08 NOTE — ASSESSMENT & PLAN NOTE
Lab Results Component Value Date/Time  WBC 3.3 07/14/2018 06:16 PM  
 HGB 10.8 07/14/2018 06:16 PM  
 HCT 34.6 07/14/2018 06:16 PM  
 PLATELET 924 55/51/8405 06:16 PM  
 Creatinine 1.43 07/14/2018 06:16 PM  
 BUN 43 07/14/2018 06:16 PM  
 Potassium 3.7 07/14/2018 06:16 PM  
 INR 1.1 05/19/2018 03:10 AM  
 Prothrombin time 11.4 05/19/2018 03:10 AM

## 2019-08-08 NOTE — ASSESSMENT & PLAN NOTE
This condition is managed by Specialist. 
Key Antihyperglycemic Medications   
    
  
 insulin aspart U-100 (NOVOLOG U-100 INSULIN ASPART) 100 unit/mL injection (Taking) by SubCUTAneous route. Sliding scale   Indications: usually needs 2 to 3 units at dinner  
 insulin degludec (TRESIBA FLEXTOUCH U-100) 100 unit/mL (3 mL) inpn (Taking) 14 Units by SubCUTAneous route daily. Other Key Diabetic Medications OMEGA-3 FATTY ACIDS/FISH OIL (OMEGA 3 FISH OIL PO) (Taking) Take 300 mg by mouth daily. Lab Results Component Value Date/Time Hemoglobin A1c 8.0 07/02/2018 09:40 AM  
 Glucose 213 07/14/2018 06:16 PM  
 Creatinine 1.43 07/14/2018 06:16 PM  
 Cholesterol, total 74 04/16/2018 04:21 AM  
 HDL Cholesterol 25 04/16/2018 04:21 AM  
 LDL, calculated 31.6 04/16/2018 04:21 AM  
 Triglyceride 87 04/16/2018 04:21 AM  
 
Diabetic Foot and Eye Exam HM Status Topic Date Due  
 Diabetic Foot Care  02/21/1945 [facility] Eye Exam  02/21/1945

## 2019-08-08 NOTE — ASSESSMENT & PLAN NOTE
This condition is managed by Specialist. 
Key CAD CHF Meds   
    
  
 furosemide (LASIX) 40 mg tablet (Taking) Take 1 Tab by mouth daily. Takes 40mg alternating with 80mg every other day or as advised by physician  
 clopidogrel (PLAVIX) 75 mg tab (Taking) TAKE ONE TABLET BY MOUTH DAILY  
 carvedilol (COREG) 12.5 mg tablet (Taking) Take 1 Tab by mouth two (2) times a day. nitroglycerin (NITROSTAT) 0.4 mg SL tablet (Taking) 1 Tab by SubLINGual route as needed for Chest Pain. Up to 3 doses. aspirin delayed-release 81 mg tablet (Taking) Take 81 mg by mouth daily. OMEGA-3 FATTY ACIDS/FISH OIL (OMEGA 3 FISH OIL PO) (Taking) Take 300 mg by mouth daily. Lab Results Component Value Date/Time  Sodium 135 07/14/2018 06:16 PM  
 Potassium 3.7 07/14/2018 06:16 PM  
 Cholesterol, total 74 04/16/2018 04:21 AM  
 HDL Cholesterol 25 04/16/2018 04:21 AM  
 LDL, calculated 31.6 04/16/2018 04:21 AM  
 Triglyceride 87 04/16/2018 04:21 AM  
 INR 1.1 05/19/2018 03:10 AM  
 Prothrombin time 11.4 05/19/2018 03:10 AM

## 2019-08-08 NOTE — ASSESSMENT & PLAN NOTE
This condition is managed by Specialist. 
Key Oncology Meds   
    
  
 palbociclib Charlean Rhein) 125 mg cap (Taking) Take  by mouth daily. Indications: 21 days on medication, 7 days off medication  
 anastrozole (ARIMIDEX) 1 mg tablet (Taking) Take 1 Tab by mouth daily. Key Pain Meds   
    
  
 acetaminophen (TYLENOL) 325 mg tablet (Taking) Take 2 Tabs by mouth every four (4) hours as needed. Lab Results Component Value Date/Time WBC 3.3 07/14/2018 06:16 PM  
 ABS.  NEUTROPHILS 1.2 07/14/2018 06:16 PM  
 HGB 10.8 07/14/2018 06:16 PM  
 HCT 34.6 07/14/2018 06:16 PM  
 PLATELET 775 01/99/4456 06:16 PM  
 Creatinine 1.43 07/14/2018 06:16 PM  
 BUN 43 07/14/2018 06:16 PM  
 ALT (SGPT) 21 07/14/2018 06:16 PM  
 AST (SGOT) 21 07/14/2018 06:16 PM  
 Albumin 2.8 07/14/2018 06:16 PM

## 2019-08-08 NOTE — ASSESSMENT & PLAN NOTE
This condition is managed by Specialist. 
Key Antihyperglycemic Medications   
    
  
 insulin aspart U-100 (NOVOLOG U-100 INSULIN ASPART) 100 unit/mL injection (Taking) by SubCUTAneous route. Sliding scale   Indications: usually needs 2 to 3 units at dinner  
 insulin degludec (TRESIBA FLEXTOUCH U-100) 100 unit/mL (3 mL) inpn (Taking) 14 Units by SubCUTAneous route daily. Other Key Diabetic Medications OMEGA-3 FATTY ACIDS/FISH OIL (OMEGA 3 FISH OIL PO) (Taking) Take 300 mg by mouth daily. Lab Results Component Value Date/Time Hemoglobin A1c 8.0 07/02/2018 09:40 AM  
 Glucose 213 07/14/2018 06:16 PM  
 Creatinine 1.43 07/14/2018 06:16 PM  
 Cholesterol, total 74 04/16/2018 04:21 AM  
 HDL Cholesterol 25 04/16/2018 04:21 AM  
 LDL, calculated 31.6 04/16/2018 04:21 AM  
 Triglyceride 87 04/16/2018 04:21 AM  
 
Diabetic Foot and Eye Exam HM Status Topic Date Due  
 Diabetic Foot Care  02/21/1945 [facility] Eye Exam  02/21/1945

## 2019-08-08 NOTE — ASSESSMENT & PLAN NOTE
This condition is managed by Specialist.  
Key CKD Meds   
    
  
 furosemide (LASIX) 40 mg tablet (Taking) Take 1 Tab by mouth daily. Takes 40mg alternating with 80mg every other day or as advised by physician  
 epoetin celio (PROCRIT) 10,000 unit/mL injection (Taking) by SubCUTAneous route once. Unsure of dosage CHOLECALCIFEROL, VITAMIN D3, (VITAMIN D-3 PO) (Taking) Take 1,000 Units by mouth daily. Lab Results Component Value Date/Time GFR est non-AA 35 07/14/2018 06:16 PM  
 GFR est AA 43 07/14/2018 06:16 PM  
 Creatinine 1.43 07/14/2018 06:16 PM  
 BUN 43 07/14/2018 06:16 PM  
 Sodium 135 07/14/2018 06:16 PM  
 Potassium 3.7 07/14/2018 06:16 PM  
 Chloride 98 07/14/2018 06:16 PM  
 CO2 27 07/14/2018 06:16 PM  
 Calcium 8.6 07/14/2018 06:16 PM  
 Magnesium 1.5 07/06/2018 08:36 AM  
 Phosphorus 3.8 06/20/2018 03:00 AM  
 
CrCl cannot be calculated (Patient's most recent lab result is older than the maximum 180 days allowed. ).

## 2019-08-08 NOTE — PROGRESS NOTES
Chief Complaint Patient presents with  New Patient  Hypertension  Diabetes 1. Have you been to the ER, urgent care clinic since your last visit? Hospitalized since your last visit? No 
 
2. Have you seen or consulted any other health care providers outside of the [address] [place] [address] since your last visit? Include any pap smears or colon screening. Yes Where: Dr. Eugene Deal 7/2019 Cancer MD, Dr. Charlette Marx 5/2019

## 2019-08-08 NOTE — Clinical Note
Mar,Can you reach out to pt/family re: ACP discussion as well as adult day centers for pt to socialize? Family would like input on how to go about this. Thanks!

## 2019-08-08 NOTE — PATIENT INSTRUCTIONS

## 2019-08-09 ENCOUNTER — PATIENT OUTREACH (OUTPATIENT)
Dept: FAMILY MEDICINE CLINIC | Age: 84
End: 2019-08-09

## 2019-08-09 NOTE — PROGRESS NOTES
Called son, Vasquez Tenorio, per pcp request to discuss ACP and Adult Day Care centers for his mother. Son asked if I could call him back on Monday morning about 10-11 am. 
Deidre Romo I would be glad to do so.

## 2019-08-12 ENCOUNTER — PATIENT OUTREACH (OUTPATIENT)
Dept: FAMILY MEDICINE CLINIC | Age: 84
End: 2019-08-12

## 2019-08-12 NOTE — PROGRESS NOTES
Called son, to discuss ACP and adult day care centers for his mother. Son said this is a bad time, he will call me back. Son called back. Scheduled to bring mother in Friday, 8/16 for ACP at 12noon. Will also give him info on adult day care centers at that time . Neida Zamora wrote order for PT- he wants to use [address] [place] [address] for PT-advised to schedule appt and call back and leave appt info on referral line so order can be faxed and authorization obtained.

## 2019-08-13 NOTE — PROGRESS NOTES
Request for last office note and labs faxed to endocrinology, and oncologist office on 08/13/2019 @ 11:00 am confirmation received.

## 2019-08-16 ENCOUNTER — TELEPHONE (OUTPATIENT)
Dept: FAMILY MEDICINE CLINIC | Age: 84
End: 2019-08-16

## 2019-08-16 ENCOUNTER — PATIENT OUTREACH (OUTPATIENT)
Dept: FAMILY MEDICINE CLINIC | Age: 84
End: 2019-08-16

## 2019-08-16 NOTE — PROGRESS NOTES
Called son, Amber Green, re scheduled appt today at 12:00pm for he and mother to meet with me re ACP and to get information on Adult [address]. Called son at 12:10 pm to see if they were coming-his VM was full. Unable to leave a message. Called son back, said he tried to call and cancel, couldn't get through.   Rescheduled her appt for 8/19 at 2pm.

## 2019-08-16 NOTE — TELEPHONE ENCOUNTER
Noted of appointment cancellation and informed Nurse Navigator. Patients son will give the office a call back to reschedule.

## 2019-08-16 NOTE — TELEPHONE ENCOUNTER
----- Message from Luis Alfredo Cooley sent at 2019 11:56 AM EDT -----  Regarding: Dr. Gunner Haywood: [phone]  Pt's son, Reema Meek, advised that they will not be able to make it to the nurse visit today at either 12:00pm or 12:30pm due to his flight getting delayed. He would like to reschedule. Attempted to transfer to . Best contact number [phone]. Outbound call. Pts  verified. Spoke with the patients son Brissa Durant (on PHI). Brissa Durant stated that he will give the office a call back later this evening to schedule nurse visit.

## 2019-08-19 ENCOUNTER — PATIENT OUTREACH (OUTPATIENT)
Dept: FAMILY MEDICINE CLINIC | Age: 84
End: 2019-08-19

## 2019-08-19 NOTE — PROGRESS NOTES
Son,Robin, called. Unable to bring mother in today as planned at 2pm due to his work schedule. Rescheduled appt for 8/26 at 11am to discuss [facility]. Gave him my direct number if needs to change again.

## 2019-08-20 ENCOUNTER — HOSPITAL ENCOUNTER (OUTPATIENT)
Dept: NUCLEAR MEDICINE | Age: 84
Discharge: HOME | End: 2019-08-20
Attending: INTERNAL MEDICINE
Payer: MEDICARE

## 2019-08-20 DIAGNOSIS — C79.51 SECONDARY MALIGNANT NEOPLASM OF BONE (HCC): ICD-10-CM

## 2019-08-20 DIAGNOSIS — C50.912 MALIGNANT NEOPLASM OF LEFT BREAST (HCC): ICD-10-CM

## 2019-08-20 PROCEDURE — 78306 BONE IMAGING WHOLE BODY: CPT

## 2019-08-22 ENCOUNTER — OFFICE VISIT (OUTPATIENT)
Dept: RHEUMATOLOGY | Age: 84
End: 2019-08-22

## 2019-08-22 VITALS
TEMPERATURE: 98.6 F | RESPIRATION RATE: 18 BRPM | BODY MASS INDEX: 21.33 KG/M2 | SYSTOLIC BLOOD PRESSURE: 132 MMHG | HEIGHT: 65 IN | WEIGHT: 128 LBS | HEART RATE: 69 BPM | DIASTOLIC BLOOD PRESSURE: 60 MMHG

## 2019-08-22 DIAGNOSIS — N18.30 CKD (CHRONIC KIDNEY DISEASE) STAGE 3, GFR 30-59 ML/MIN (HCC): ICD-10-CM

## 2019-08-22 DIAGNOSIS — Z79.60 LONG-TERM USE OF IMMUNOSUPPRESSANT MEDICATION: ICD-10-CM

## 2019-08-22 DIAGNOSIS — C50.919 METASTATIC BREAST CANCER (HCC): ICD-10-CM

## 2019-08-22 DIAGNOSIS — M81.0 AGE-RELATED OSTEOPOROSIS WITHOUT CURRENT PATHOLOGICAL FRACTURE: ICD-10-CM

## 2019-08-22 DIAGNOSIS — M05.79 SEROPOSITIVE RHEUMATOID ARTHRITIS OF MULTIPLE SITES (HCC): Primary | ICD-10-CM

## 2019-08-22 DIAGNOSIS — E55.9 VITAMIN D DEFICIENCY: ICD-10-CM

## 2019-08-22 NOTE — PATIENT INSTRUCTIONS
Discuss with Dr. Rosanne Hope about lowering the Solu-Medrol to 60 mg instead of 125 mg as part of your rituximab pre-medication

## 2019-08-22 NOTE — LETTER
8/22/19 Patient: Watson Weaver YOB: 1935 Date of Visit: 8/22/2019 Claudia Sutton MD 
[address] [facility] [place] [place] [id number] VIA In Basket Dear Claudia Sutton MD, Thank you for referring Ms. Samantha Kiser to [address] [place] [address] for evaluation. My notes for this consultation are attached. If you have questions, please do not hesitate to call me. I look forward to following your patient along with you.  
 
 
Sincerely, 
 
Yamil Arriola MD

## 2019-08-22 NOTE — PROGRESS NOTES
Chief Complaint   Patient presents with    Arthritis     1. Have you been to the ER, urgent care clinic since your last visit? Hospitalized since your last visit? No    2. Have you seen or consulted any other health care providers outside of the [address] [place] [address] since your last visit? Include any pap smears or colon screening.  No

## 2019-08-22 NOTE — PROGRESS NOTES
REASON FOR VISIT    This is a follow up for Ms. Ledesma for     ICD-10-CM   1. Seropositive rheumatoid arthritis of multiple sites ([facility] M05.79   2. Age-related osteoporosis without current pathological fracture M81.0     Inflammatory arthritis phenotype includes:  Anti-CCP positive: yes (71)  Rheumatoid factor positive: yes (81.8)  Erosive disease: yes  Extra-articular manifestations include: rheumatoid nodules    Immunosuppression Screening (12/20/2017):   Quantiferon TB: negative  PPD:  Not performed  Hepatitis B: negative   Hepatitis C: negative    Therapy History includes:  Current DMARD therapy includes: rituximab 1000 mg IV every 6 months (6/2019 to present)  Prior DMARD therapy includes: methotrexate 25 mg subcutaneous, Orencia subcutaneous (3/2017-9/2017), Enbrel 50 mg weekly (10/2017-4/2018)  The following DMARDs have been ineffective: Orencia  The following DMARDs were stopped because of side effects: none  Contra-indicated DMARDs because of chronic kidney disease: methotrexate  Contra-indicated DMARDs because of heart failure: anti-TNF    Osteoporosis Historical Synopsis     Height loss since age 29 (at least two inches): 1.5   Fracture history includes: yes (right radius and ulna, hairline in legs, L3, T10, T12 vertebral fractures)  Family history of hip fracture: no     Daily calcium intake is 1200 mg  Daily vitamin D intake is 1000 IU     Smoking history: no  Alcohol consumption: no  Prednisone history: no     Exercise: yes (walks around the house, core exercises)     Previous work-up for osteoporosis includes the following:  DEXA Scan: 11/23/2016  Vitamin 25OH D level: 45.1 (1/28/2019)  PTH: 101 (1/28/2019)  TSH: 0.017 (11/16/2018)     Therapy History includes:  Current osteoporosis therapy includes: Xgeva (10/2018 to present)  Prior osteoporosis therapy includes: Fosmax, Chandrika Homme, Forteo (1/19/2017-2/11/2019)  The following osteoporosis have been ineffective: none  The following osteoporosis were stopped because of side effects: none     Immunizations:   Immunization History   Administered Date(s) Administered    (RETIRED) Pneumococcal Vaccine (Unspecified Type) 06/29/2006    Influenza High Dose Vaccine PF 11/01/2016    Influenza Vaccine (Tri) Adjuvanted 10/08/2018    Influenza Vaccine Split 10/15/2010, 10/01/2011    Influenza Vaccine Whole 10/01/2004     Active problems include:    Patient Active Problem List   Diagnosis Code    Type 2 diabetes mellitus with neurologic complication, with long-term current use of insulin ([facility] E11.49, Z79.4    Colon cancer ([facility] C18.9    Age-related osteoporosis without current pathological fracture M81.0    HTN, goal below 140/90 I10    Anemia D64.9    Hyperlipidemia E78.5    Carotid bruit R09.89    PAD (peripheral artery disease) ([facility]) I73.9    Weakness due to cerebrovascular accident [id number]    Seropositive rheumatoid arthritis of multiple sites ([facility] M05.79    Primary osteoarthritis of both knees M17.0    Long-term use of immunosuppressant medication Z79.899    Statin intolerance Z78.9    Asymptomatic hyperuricemia E79.0    Vitamin D deficiency E55.9    CKD (chronic kidney disease) stage 3, GFR 30-59 ml/min ([facility]) N18.3    Acute on chronic systolic HF (heart failure) ([facility]) I50.23    Metastatic breast cancer ([facility] C50.919    CAD (coronary artery disease) I25.10    Type 2 diabetes with nephropathy ([facility]) E11.21     HISTORY OF PRESENT ILLNESS    Ms. Em Garcia returns for an acute visit. On her last visit, I discussed starting rituximab but her son preferred not so I started leflunomide. Dr. Home Gray contacted me requested to use rituixmab instead, so I spoke with her son and we were all agreeable to start rituximab. She received her first cycle end of June beginning of July and had hyperglycemia in 400's. I reviewed [facility] labs by Dr. Home Gray dated 8/10/2019. Today, she denies pain or swelling in her fingers. She feels stiffness that lasts hours.  She cannot make a fist well until the afternoon. She endorses chronic blurred vision. She denies fever, ankle swelling,  weight loss, vision loss, oral ulcers, dry cough, dyspnea, nausea, vomiting, dysphagia, abdominal pain, black/bloody stools,, fall since last visit, rash, easy bruising and increased thirst.    Last toxicity monitoring by blood work was done on 8/10/2019 and did not reveal any significant adverse effects, except WBC 3.3, Hct 33.5%, creatinine 1.75 mg/dL, eGFR 27. Most recent inflammatory markers from 9/28/2017 revealed a ESR 18 mm/hr (previously 6, 12 mm/hr) and CRP 1.5 mg/dL (previously 0.7, 1.4 mg/L). The patient has had any interval hospital admissions and surgeries. REVIEW OF SYSTEMS    A comprehensive review of systems was performed and pertinent results are documented in the HPI, review of systems is otherwise non-contributory.     PAST MEDICAL HISTORY    She has a past medical history of Acute on chronic systolic HF (heart failure) ([address] (5/19/2018), Age-related osteoporosis without current pathological fracture (9/2/2009), Anemia (9/2/2009), Asymptomatic hyperuricemia (2/16/2017), Breast cancer ([address] CAD (coronary artery disease) (6/21/2018), Carotid bruit (8/31/2011), CHF (congestive heart failure) ([address] CKD (chronic kidney disease) stage 3, GFR 30-59 ml/min ([address] (10/25/2017), Colon cancer ([address] (9/2/2009), Colon cancer ([address] (9/2/2009), DM (diabetes mellitus) ([address] (9/2/2009), GERD (gastroesophageal reflux disease), H/O: CVA (9/2/2009), Heart attack ([address] HTN, goal below 140/90 (9/2/2009), Hypothyroid (9/2/2009), Ill-defined condition, Long-term use of immunosuppressant medication (11/21/2016), Metastatic breast cancer ([address] (6/1/2018), Microalbuminuria (9/2/2009), Osteoporosis (9/2/2009), Other and unspecified hyperlipidemia (1/27/2010), PAD (peripheral artery disease) ([facility] (9/7/2011), Primary osteoarthritis of both knees (9/28/2016), Rheumatoid arthritis involving ankle ([facility] (9/28/2016), Rheumatoid arthritis(714.0) (9/2/2009), Seropositive rheumatoid arthritis of multiple sites ([facility] (9/28/2016), Statin intolerance (12/21/2016), Type 2 diabetes mellitus with neurologic complication, with long-term current use of insulin ([facility] (9/2/2009), Type 2 diabetes with nephropathy ([facility] (8/20/2018), Unspecified essential hypertension (9/2/2009), Vitamin D deficiency (6/16/2017), and Weakness due to cerebrovascular accident. FAMILY HISTORY    Her family history includes Cancer in her father; Diabetes in her brother, mother, sister, and sister; Other in her sister; Stroke in her mother. SOCIAL HISTORY    She reports that she has never smoked. She has never used smokeless tobacco. She reports that she does not drink alcohol or use drugs. MEDICATIONS    Current Outpatient Medications   Medication Sig Dispense Refill    rituximab (RITUXAN IV) 1,000 mg by IntraVENous route every 6 months.  furosemide (LASIX) 40 mg tablet Take 1 Tab by mouth daily. Takes 40mg alternating with 80mg every other day or as advised by physician 135 Tab 1    clopidogrel (PLAVIX) 75 mg tab TAKE ONE TABLET BY MOUTH DAILY 30 Tab 11    carvedilol (COREG) 12.5 mg tablet Take 1 Tab by mouth two (2) times a day. 60 Tab 5    insulin aspart U-100 (NOVOLOG U-100 INSULIN ASPART) 100 unit/mL injection by SubCUTAneous route. Sliding scale   Indications: usually needs 2 to 3 units at dinner      insulin degludec (TRESIBA FLEXTOUCH U-100) 100 unit/mL (3 mL) inpn 14 Units by SubCUTAneous route daily.  denosumab (XGEVA SC) by SubCUTAneous route every thirty (30) days.  epoetin celio (PROCRIT) 10,000 unit/mL injection by SubCUTAneous route once. Unsure of dosage      REPATHA SURECLICK pen injection INJECT 1 ML SUBCUTANEOUS EVERY 14 DAYS 2 Pen 11    palbociclib (IBRANCE) 125 mg cap Take  by mouth daily.  Indications: 21 days on medication, 7 days off medication      acetaminophen (TYLENOL) 325 mg tablet Take 2 Tabs by mouth every four (4) hours as needed. 10 Tab 0    b-complex with vitamin c tablet Take 1 Tab by mouth daily. 30 Tab 0    nitroglycerin (NITROSTAT) 0.4 mg SL tablet 1 Tab by SubLINGual route as needed for Chest Pain. Up to 3 doses. 40 Tab 0    anastrozole (ARIMIDEX) 1 mg tablet Take 1 Tab by mouth daily. 90 Tab 0    polyethylene glycol (MIRALAX) 17 gram packet Take 1 Packet by mouth daily. 1 Each 0    levothyroxine (SYNTHROID) 88 mcg tablet Take 88 mcg by mouth Daily (before breakfast).  aspirin delayed-release 81 mg tablet Take 81 mg by mouth daily.  OMEGA-3 FATTY ACIDS/FISH OIL (OMEGA 3 FISH OIL PO) Take 300 mg by mouth daily.  CHOLECALCIFEROL, VITAMIN D3, (VITAMIN D-3 PO) Take 1,000 Units by mouth daily. ALLERGIES    Allergies   Allergen Reactions    Statins-Hmg-Coa Reductase Inhibitors Other (comments)     Intolerant to statins     Sulfa (Sulfonamide Antibiotics) Other (comments)     syncope       PHYSICAL EXAMINATION    Visit Vitals  /60   Pulse 69   Temp 98.6 °F (37 °C)   Resp 18   Ht 5' 5\" (1.651 m)   Wt 128 lb (58.1 kg)   BMI 21.30 kg/m²     Body mass index is 21.3 kg/m². General: Patient is alert, oriented x 3, not in acute distress, sitting in wheelchair son at bedside    HEENT:   Sclerae are not injected and appear moist.  There is no alopecia. Cardiovascular:  Heart is regular rate and rhythm, no murmurs. Chest:  Lungs are clear to auscultation bilaterally. No rhonchi, wheezes, or crackles. Extremities:  Free of clubbing, cyanosis, +1 non-pitting tender edema on her lower extremities (RESOLVED)    Skin exam:  There are no rashes, no alopecia, no discoid lesions, no active Raynaud's, no livedo reticularis, no periungual erythema.     Post-inflammatory hyperpigmentation from Pustular herpetic lesions from her left lower back dermatomally with anterior involvement    Musculoskeletal exam:  A comprehensive musculoskeletal exam was performed for all joints of each upper and lower extremity and assessed for swelling, tenderness and range of motion.  Positive results are documented as below:    Bilateral knee crepitus without effusion     Joint Count 8/22/2019 5/30/2019 2/18/2019 4/3/2018 12/20/2017 9/28/2017 6/16/2017   Patient pain (0-100) 5 70 5 15 30 (No Data) 10   MHAQ 1 2 1.75 1.615 0.75 (No Data) 0.875   Left elbow - Tender - - - - - - -   Left elbow - Swollen - - - - - - -   Left wrist- Tender - - - - - 1 -   Left wrist- Swollen - - - - - - 1   Left 1st MCP - Tender - - - - - - -   Left 1st MCP - Swollen - - - - - 1 1   Left 2nd MCP - Tender - - - - - - -   Left 2nd MCP - Swollen - - - - - - -   Left 3rd MCP - Tender - - - - - 1 -   Left 3rd MCP - Swollen - - - - - 1 -   Left 4th MCP - Tender - - - - - - -   Left 4th MCP - Swollen - - - - - - -   Left 5th MCP - Tender - - - - - - -   Left 5th MCP - Swollen - - - - - - 1   Left thumb IP - Tender - - - - - - -   Left thumb IP - Swollen - - - - - - -   Left 2nd PIP - Tender - - - - - - -   Left 2nd PIP - Swollen - - - - - - -   Left 3rd PIP - Tender - - - - - - -   Left 3rd PIP - Swollen - - - - - - -   Left 4th PIP - Tender - - - - - - -   Left 5th PIP - Tender - - - - - - -   Right shoulder - Tender - - - - - 1 -   Right elbow - Tender - - - - - - 1   Right elbow - Swollen - - - - - - 1   Right wrist- Tender 1 1 1 - 1 1 1   Right wrist- Swollen 1 1 1 1 1 1 1   Right 1st MCP - Tender - 1 1 1 - 1 1   Right 1st MCP - Swollen - 1 1 1 - 1 1   Right 2nd MCP - Tender - 1 1 1 - - -   Right 2nd MCP - Swollen - 1 1 - - - 1   Right 3rd MCP - Tender - 1 1 1 1 1 1   Right 3rd MCP - Swollen - 1 1 1 1 1 1   Right 4th MCP - Tender - 1 1 1 1 1 1   Right 4th MCP - Swollen - 1 1 1 1 1 1   Right 5th MCP - Tender - 1 1 1 - 1 1   Right 5th MCP - Swollen - - 1 1 - 1 1   Right thumb IP - Tender - 1 1 1 - - 1   Right thumb IP - Swollen - 0 - - - - -   Right 2nd PIP - Tender - 1 - 1 - - 1   Right 2nd PIP - Swollen - 0 - 1 - - 1 Right 3rd PIP - Tender - 1 - 1 - - 1   Right 3rd PIP - Swollen - 0 - - - - -   Right 4th PIP - Tender - 1 - 1 - 1 1   Right 4th PIP - Swollen - 0 - - - - -   Right 5th PIP - Tender - 1 - - - 1 1   Right 5th PIP - Swollen - 0 - - - - -   Tender Joint Count (Total) 1 11 7 9 3 10 11   Swollen Joint Count (Total) 1 5 6 6 3 7 11   Physician Assessment (0-10) 0.5 3 2 3 2 4 3   Patient Assessment (0-10) 1 7.5 8.5 4.5 2.5 (No Data) 0.5   CDAI Total (calculated) 3.5 26.5 23.5 22.5 10.5 - 25.5        DATA REVIEW    Laboratory     Recent laboratory results were reviewed, summarized, and discussed with the patient. Imaging    Musculoskeletal Ultrasound    None    Radiographs    Chest 4/16/2018: normal heart size. Mild pulmonary edema is noted. Degenerative changes are seen in the thoracic spine. Calcified granuloma is seen in the right upper lobe.     Chest 2/09/2018: Cardiac silhouette is enlarged. Pulmonary vasculature is not engorged. There are no focal consolidation, effusions, or pneumothorax. Ossified granuloma right upper lobe. Aorta remains atherosclerotic     Right shoulder 2/09/2018: no fracture, dislocation or other acute abnormality. Bones are osteopenic. Incidental granuloma right upper lobe    Lumbar 2/09/2018: superior endplate compression of L3 with approximately 50% loss of vertebral body height. Superior endplate compression of T12 with approximately one third loss of vertebral body height. Slight compression of T10. Osteopenia. Degenerative changes throughout the spine. Vascular calcifications      Chest 9/28/2016: normal heart size. Is atherosclerotic change of the aorta. The lungs are clear acute process. There is calcified granuloma in the right upper lobe with calcified right hilar lymph nodes. There is moderate compression deformity of approximately the T12 vertebral body that appears chronic. There are degenerative changes of the thoracic spine.     Bilateral Hand 9/28/2016: LEFT: Bones are osteopenic. No acute fracture or dislocation. Moderate radiocarpal joint osteoarthritis. Age-appropriate intercarpal and first [facility] osteoarthritis. There are erosions at the third MCP joint. No other erosion. No other joint space narrowing. No soft tissue calcification.  RIGHT: No acute fracture. Old radius intra-articular fracture has healed. Increased now moderate radiocarpal joint osteoarthritis. Increased mild first MCP joint osteoarthritis. Increased mild-moderate first IP joint osteoarthritis. No joint space erosion or periosteal reaction. Alignment is within normal limits. Osteopenia is unchanged. No soft tissue calcification. Bilateral Foot 9/28/2016: LEFT: Bones are osteopenic. No acute fracture or dislocation. No erosion. Age-appropriate bilateral first MTP joint osteoarthritis. Minimal intertarsal osteoarthritis for age. No periosteal reaction or soft tissue calcification. RIGHT: Bones are osteopenic. No acute fracture or dislocation. No erosion. Age-appropriate bilateral first MTP joint osteoarthritis. Minimal intertarsal osteoarthritis for age. No periosteal reaction or soft tissue calcification. Right Hand 4/10/2010: showed demonstrate osteopenia. Tatiana Sans is an acute T-shaped intra-articular distal radial fracture. Ulna styloid peri like avulsion is also noted. There is soft tissue swelling    CT Imaging    CT Head, Abdomen, and Pelvis without contrast 4/24/2018: The patient's arms at her sides cause scatter artifact over the examination. Left breast carcinoma: A spiculated left breast mass at 6:00 in the posterior third extends to the dermal-epidermal junction, and probably to the pectoralis muscle. It measures approximately 21 x 32 x 27 mm, and enhanced more avidly than other tissue in the left breast or any tissue in the right breast on prior MRI. There is probably more extensive disease throughout the left breast. Left breast skin thickening is associated.   There is diffuse, mixed lucent and sclerotic, osseous metastatic disease. There are pathologic fractures of T12 and L3. Though no displaced fracture is visible, the degree of tumor replacement of the bilateral sacral wings makes occult or impending pathologic fracture likely. There are multiple bilateral pathologic rib fractures. The posterior cortices of T10, T11, and L1 are largely destroyed. No definite epidural extension on this unenhanced CT. Inferior chest: Moderate bilateral pleural effusions are associated with subtotal collapse of the bilateral lower lobes. Interlobular septal thickening is consistent with interstitial edema. The heart is mildly enlarged, and three-vessel coronary artery calcifications are severe. Aortic valvular calcifications are moderate. Calcified hilar lymph nodes, and splenic and hepatic calcifications, are consistent with old granulomatous disease. Abdomen: Unenhanced CT is neither sensitive nor specific for pyelonephritis, which is manifest on cross-sectional imaging by hazy perfusion defects. No gross perfusion defects on motion limited MRI from 4/20/2018. There is a large left lower pole renal calculus; no definite calyceal dilation upstream. Other bilateral renal calculi are parenchymal and vascular, rather than urinary, in origin. The unenhanced distal esophagus, stomach, duodenum, gallbladder, pancreas, and adrenals are normal. Pelvis: A large stool ball is impacted in the rectum. Surrounding presacral and mesorectal edema suggest stroke or a proctitis. A Alford catheter is in place in the bladder. The unenhanced small bowel and proximal colon are normal. There is trace ascites. No free air or abdominopelvic lymphadenopathy. CT Head without contrast 2/09/2018: There is diffuse age-related parenchymal volume loss. The ventricles and sulci are age-appropriate without hydrocephalus. There is no mass effect or midline shift. There is no intracranial hemorrhage or extra-axial fluid collection.  Scattered foci of low attenuation in the periventricular white matter most likely represent age-indeterminate microvascular ischemic changes. The gray-white matter differentiation is maintained. The basal cisterns are patent. The osseous structures are intact. There is diffuse paranasal sinus opacification with aerated secretions and fluid levels in the maxillary and sphenoid sinuses. The mastoid air cells are clear. MR Imaging    MRI Abdomen with and without contrast 4/20/2018: MRCP: No pancreatic or biliary ductal dilation. No stricture or choledocholithiasis. LIVER: Suboptimal postcontrast imaging secondary to motion. 2 subcentimeter T2 hyperintense foci are noted in the right lobe, most likely representing tiny cysts. There are multiple suspicious lesions seen on the diffusion weighted sequences in the right and left hepatic lobes. The largest of these measures approximately 16mm. Many of these have subtle associated T2 hyperintense signal. Metastatic disease is suspected. GALLBLADDER: Contracted. No gallstones. PANCREAS: Normal. SPLEEN: Normal. ADRENALS: Normal. KIDNEYS: Normal. DISTAL ESOPHAGUS: Normal. STOMACH AND DUODENUM: Normal. VISUALIZED SMALL BOWEL AND COLON: Normal. PERITONEUM: No free fluid and no abdominal lymphadenopathy. VISUALIZED LUNG BASES: Small bilateral pleural effusions are noted with associated atelectasis/consolidation in both lower lobes. BONES: Widespread osseous metastases are noted. CHEST WALL: There is a spiculated mass in the central aspect of the left breast measuring 3.5 x 3.2 x 2.8 cm. A smaller discrete mass measuring 0.9 x 1.3 cm seen medially in the left breast. Multiple other areas of concerning areas of enhancement are noted in the left breast, not well assessed on the current study. MRI Brain with and without contrast 3/19/2018: EXTRA-AXIAL SPACES: Prominent cortical sulci, consistent with cerebral volume loss. Prominence of the sylvian fissures and basal cisterns.  INTRACRANIAL HEMORRHAGE: None. VENTRICULAR SYSTEM:  Normal in size and morphology for the patient's age. BASAL CISTERNS:  Normal. CEREBRAL PARENCHYMA:  Extensive scattered and confluent foci of FLAIR/T2 hyperintensity in the cerebral white matter, most likely due to intracranial small vessel disease. No enhancing lesions. No evidence of diffusion restriction. MIDLINE SHIFT: None. CEREBELLUM:  Inferior vermian hypoplasia. Mild volume loss. BRAINSTEM:  Diffusely atrophic. Chronic bilateral pontine lacunar infarcts, right more extensive than left. CALVARIUM:  Multiple scattered T1 hypointense foci in the calvarium, suspicious for metastatic disease. Not identified as destructive lesions on recent head CT. VASCULAR SYSTEM:  Normal flow voids. PARANASAL SINUSES AND MASTOID AIR CELLS:  Clear. VISUALIZED ORBITS: Previous bilateral cataract surgery. VISUALIZED UPPER CERVICAL SPINE:  Normal. SELLA: Normal. SKULL BASE: Normal. No cranial nerve thickening or abnormal enhancement, but some images compromised by patient motion. DXA     DXA 11/23/2016: (L3 for compression fracture and L4 for spondylitic change) lumbar spine L1-L2 T score -1.7 (BMD 0.971 g/cm2), right femoral neck T score: -2.0 (0.757 g/cm2), righttotal hip T score: -1.7 (0.791 g/cm2), and distal one third left radius T score -4.6 (0.472 g/cm2). FRAX score 33.2 % probability in 10 years for major osteoporotic fracture and 21 % 10 year probability of hip fracture. Echocardiogram    Echocardiogram 6/01/2018: LEFT VENTRICLE: Size was normal. Systolic function was moderately to markedly reduced. Ejection fraction was estimated to be 30 %. No obvious wall motion abnormalities identified in the views obtained. RIGHT VENTRICLE: The size was normal. Systolic function was normal. LEFT ATRIUM: Size was normal. RIGHT ATRIUM: Size was normal. MITRAL VALVE: Normal valve structure. AORTIC VALVE: Normal valve structure. TRICUSPID VALVE: Normal valve structure.  PULMONIC VALVE: Not well visualized. AORTA: The root exhibited normal size. PERICARDIUM: A small pericardial effusion was identified circumferential to the heart. There was no evidence of hemodynamic compromise. Echocardiogram 8/23/2013: Left ventricle: Systolic function was normal. Ejection fraction was estimated in the range of 55 % to 60 %. There were no regional wall motion abnormalities. Doppler parameters were consistent with abnormal left ventricular relaxation (grade 1 diastolic dysfunction). Left atrium: The atrium was moderately dilated. Atrial septum: There was a probable patent foramen ovale. Right atrium: The atrium was mildly dilated. Mitral valve: There was mild annular calcification. Aortic valve: The valve was not visualized well enough to rule out a bicuspid morphology. Transaortic velocity was increased due to increased transvalvular flow. Tricuspid valve: There was mild regurgitation. Nuclear Medicine    Triple Phase Bone Scan 4/20/2018: There are numerous skeletal foci of activity compatible with metastases. There are multiple in the skull as well as throughout the spine, sternum, ribs, shoulders, pelvis and femora. PATHOLOGY    Left breast, core biopsies 4/23/2018: Sclerosed and necrotic papillary lesion with focal areas concerning for invasive carcinoma. Estrogen and Progesterone positive    ASSESSMENT AND PLAN    This is a follow-up visit for Ms. Ledesma. 1) Seropositive Erosive Nodular Rheumatoid Arthritis. She is on rituximab 1000 mg every 6 months since 6/2019 with improvement. She has hyperglycemia, so I asked them to discuss with Dr. Zo Mckeon, who is prescribing, to half the Medrol dose to 60 mg. Her CDAI was 3.5 (previously 26.5, 23.5, 22.5, 10.5, 25.5, 29.5, 23, 47, 23) with 1 tender and 1 swollen joints, consistent with low disease activity. She complains of stiffness. She is better, so will continue treatment. 2) Long Term Use of Immunosuppressants.  The patient remains on immunomodulatory medications (rituximab) and requires frequent toxicity monitoring by blood work. 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 She is taking calcium 1200 mg and 1000 of vitamin D daily. She has completed two months of Forteo and is now on Xgeva due to breast cancer and bony metastasis.    4) Bilateral Knee Osteoarthritis. This was not an active issue. 5) Asymptomatic Hyperuricemia. Her uric acid was 11.7  mg/dL (previously 8.7, 7.2 mg/dL). There is no history of gout. 6) Vitamin D Deficiency. Her level was 45.1 (previously 67.0, 82.3, 59.4). She is on 1000 daily     7) Elevated ALK. Her ALK 65 (previously 522, 283). This was due to bony metastasis     8) Chronic Kidney Disease Stage 3. Her creatinine was 1.75 mg/dL, eGFR 27. She follows with Dr. Serina Lowe. 9) Metastatic Breast Cancer. She follows with Dr. Merrill Khoury and is on Arimidex and Ibrance. 10) Peripheral Neuropathy. This is an active issue. The patient voiced understanding of the aforementioned assessment and plan. Summary of plan was provided in the After Visit Summary patient instructions.      TODAY'S ORDERS    Orders Placed This Encounter    rituximab (RITUXAN IV)     Future Appointments   Date Time Provider Prasanna Diana   8/26/2019  4:00 PM April Larosed [facility]   10/15/2019 10:30 AM Talia Real MD [id number] [facility]   11/8/2019 10:00 AM Radha Landers  [address]   2/24/2020  3:00 PM MD Ponce Reyes MD, [address] [facility]    Adult Rheumatology   Rheumatology Ultrasound Certified  [address]  [facility], [address]   Phone [phone]  Fax [phone]

## 2019-08-25 PROBLEM — R42 DIZZINESS: Status: ACTIVE | Noted: 2019-01-01

## 2019-08-25 NOTE — H&P
[address]  HISTORY AND PHYSICAL    Name:  Emani Pagan  MR#:  [id number]  :  1935  ACCOUNT #:  [de-identified]  ADMIT DATE:  2019    Pt is seen 2019 @ 04:35. PRIMARY CARE PHYSICIAN:  Ashleigh Bush MD.    PRESENTING COMPLAINT:  Dizziness. HISTORY OF PRESENT COMPLAINT:  The patient is an 82-year-old female with past medical history of congestive heart failure, MI, status post stents, stage IV breast cancer, diabetes, stroke, and rheumatoid arthritis who presented to the emergency room with complaints of dizziness. The patient reports that she had some ringing in the ear over the past month. She had a dental procedure about 3 weeks ago. Dizziness started last night around 10:00 p.m. The patient reports associated nausea but no vomiting. According to the patient, she has dizziness whenever she turns her head to any side. She denies numbness, focal weakness, tingling sensation, speech abnormality or visual changes. There is no headache. The patient has no neck pain, neck stiffness or confusion. She denied chest pain, chest tightness, shortness of breath, dyspnea on exertion, PND or orthopnea. There is no diaphoresis, wheezing or rhonchi. The patient has no cough, nasal congestion, sore throat, epistaxis or rhinorrhea. She has no fever, chills or rigors. According to the patient, she has seen ENT in the past and he did some maneuvers of her head which precipitated dizziness. The patient has not had any recent trauma or fall. She denies lower extremity pain or swelling. PAST MEDICAL HISTORY:  1.  CHF, systolic. 2.  Osteoporosis. 3.  Anemia. 4.  Hyperuricemia. 5.  Breast cancer stage IV. 6.  Coronary artery disease, status post stents. 7.  Chronic kidney disease stage III. 8.  Colon cancer, status post surgery and chemo. 9.  Diabetes mellitus. 10.  Gastroesophageal reflux disease. 11.  History of CVA. 12.  Hypertension. 13.  Hypothyroidism.   14. Peripheral artery disease. 15.  Rheumatoid arthritis. 16.  Vitamin D deficiency. PAST SURGICAL HISTORY:  1. Colectomy. 2.  Hysterectomy. 3.  Colonoscopy. MEDICATIONS:  Acetaminophen 650 every 4 hours as needed, anastrozole 1 mg daily, aspirin 81 mg daily, B complex 1 tablet daily, carvedilol 12.5 mg b.i.d., cholecalciferol 1000 units daily, clopidogrel 75 mg daily, denosumab subcu every 30 days, epoetin 10,000 units injection subcu once, furosemide 40 mg daily, insulin aspart sliding scale, insulin pen 14 units subcu daily, levothyroxine 88 mcg daily, nitroglycerin 0.4 mg sublingual every 5 minutes, omega-3 fatty acids 300 mg daily, polyethylene glycol 17 g daily, Repatha injection every 14 days, and rituximab injection 1000 mg every 6 months. ALLERGIES:  STATINS, HMG-CoA INHIBITORS, SULFA. SOCIAL HISTORY:  The patient is . She lives with family. She denies tobacco, alcohol or recreational drug use. FAMILY HISTORY:  Diabetes, mother. Stroke, mother. Diabetes, sister. Diabetes, brother. Stomach cancer, father. Diabetes, sister. REVIEW OF SYSTEMS:  More than 12 systems reviewed and the pertinent positives are in the history of present illness. All others are negative. PHYSICAL EXAMINATION:  GENERAL:  On examination, the patient is seen lying in bed, in no acute respiratory distress. VITAL SIGNS:  Blood pressure 185/62, pulse 81, respirations 16, temperature 98, pulse ox 100%. HEAD, EYES, EARS, NOSE AND THROAT:  Atraumatic, normocephalic. Anicteric. No pallor, no jaundice. Extraocular muscles intact. Pupils bilaterally reactive, equal, round. NECK:  Supple. No JVD. No carotid bruit. HEART:  Sounds one and two, regular rate and rhythm. No gallop, no friction, no rub. RESPIRATION:  No wheezing, no rhonchi, no rales. ABDOMEN:  Soft, nontender. Bowel sounds normoactive. NEURO:  Alert, oriented x3. Cranial nerves II-XII intact. SKIN:  Warm and dry.   Normal skin color.  Normal skin turgor. PSYCH:  Normal mood, normal affect. BACK:  No CVA tenderness. EXTREMITIES:  No edema. No cyanosis. Normal range of motion. DIAGNOSTIC TESTS:  EKG showed normal sinus rhythm, rate 74 beats per minute, nonspecific ST-T wave changes. LABORATORY DATA:  WBC 2.2, hemoglobin 11.2, hematocrit 34.7, platelets 880. Sodium 134, potassium 4.1, chloride 100, carbon dioxide 27, glucose 231, BUN 41, creatinine 1.82, calcium 9.7. ALT 23, AST 25, alk phos 116. Troponin negative. CT of the head without contrast showed:  1. Atrophy and chronic small vessel ischemic disease. The white matter again shown. No acute intracranial findings. 2.  Extensive sinus disease again shown. Maxillary sinus fluid level is no longer demonstrated. ASSESSMENT:  1.  Dizziness. 2.  Possible benign paroxysmal positional vertigo versus dehydration. 3.  Congestive heart failure, systolic. 4.  Hypertension. 5.  Hyperlipidemia. 6.  Diabetes mellitus. 7.  Coronary artery disease, status post stent. 8.  Breast cancer with metastasis. 9.  Worsening chronic kidney disease. 10.  History of colon cancer. 11.  Gastroesophageal reflux disease. 12.  History of cerebrovascular accident. 13.  Hypothyroidism. 14.  Osteoporosis. 15.  Vitamin D deficiency. 16.  Asymptomatic hyperuricemia. 17.  Anemia. 18.  Rheumatoid arthritis  19. Peripheral neuropathy    PLAN:  1. To admit the patient to telemetry under hospitalist service. 2.  Gentle hydration. 3.  Monitor kidney function with hydration. 4.  Monitor electrolytes with hydration. 5.  Serial cardiac enzymes. 6.  MRI of the brain without contrast.  7.  Neurology consult. 8.  Monitor vital signs including blood pressure as per unit protocol. 9.  Restart appropriate home medications. 10.  DVT prophylaxis, heparin. 11.  Fall precautions, skin care precaution, bedrest.  12.  I discussed advance directive with the patient.   13.  The children are the next of kin. Code is partial code. The patient does not want CPR and intubation. 14.  Further management will depend on the patient's clinical progression.   Further evaluation by attending physician, result of tests, and recommendation by specialist.      Jhony Thomas MD      [id number]  D:  08/25/2019 5:08  T:  08/25/2019 5:18  JOB #:  [id number]

## 2019-08-25 NOTE — CONSULTS
Consult dictated. BPPV. Performed bedside vestibular exercises. Recommend outpatient vestibular PT. Okay to DC home.   Carmita Gama MD

## 2019-08-25 NOTE — DISCHARGE INSTRUCTIONS
Discharge Instructions       PATIENT ID: Tavo Hinson  MRN: [id number]   YOB: 1935    DATE OF ADMISSION: 8/25/2019 12:44 AM    DATE OF DISCHARGE: 8/25/2019    PRIMARY CARE PROVIDER: Luís Escalante MD     ATTENDING PHYSICIAN: Corina Post MD  DISCHARGING PROVIDER: Vikki Fu MD    To contact this individual call [phone] and ask the  to page. If unavailable ask to be transferred the Adult Hospitalist Department. DISCHARGE DIAGNOSES BPPV and UTI    CONSULTATIONS: IP CONSULT TO HOSPITALIST  IP CONSULT TO NEUROLOGY    PROCEDURES/SURGERIES: * No surgery found *    PENDING TEST RESULTS:   At the time of discharge the following test results are still pending:     FOLLOW UP APPOINTMENTS:   Follow-up Information     Follow up With Specialties Details Why Contact Elliot Huffman MD [facility] In 1 week  [address]  [phone]             ADDITIONAL CARE RECOMMENDATIONS:  Stay hydrated drink plenty of water  Next 24-48 hrs stop lasix for 2 days    DIET: Regular Diet and Cardiac Diet    ACTIVITY: Activity as tolerated    WOUND CARE:     EQUIPMENT needed:       DISCHARGE MEDICATIONS:   See Medication Reconciliation Form    · It is important that you take the medication exactly as they are prescribed. · Keep your medication in the bottles provided by the pharmacist and keep a list of the medication names, dosages, and times to be taken in your wallet. · Do not take other medications without consulting your doctor. NOTIFY YOUR PHYSICIAN FOR ANY OF THE FOLLOWING:   Fever over 101 degrees for 24 hours. Chest pain, shortness of breath, fever, chills, nausea, vomiting, diarrhea, change in mentation, falling, weakness, bleeding. Severe pain or pain not relieved by medications. Or, any other signs or symptoms that you may have questions about.       DISPOSITION:  x  Home With:   OT  PT  [facility]  RN       SNF/Inpatient Rehab/LT[facility] Independent/assisted living    Hospice    Other:     CDMP Checked:   Yes x     PROBLEM LIST Updated:  Yes x       Signed:   Vikki Fu MD  8/25/2019  1:05 PM

## 2019-08-25 NOTE — ROUTINE PROCESS
TRANSFER - OUT REPORT:    Verbal report given to Gisela(name) on Nilo Thomas  being transferred to NSTU(unit) for routine progression of care       Report consisted of patients Situation, Background, Assessment and   Recommendations(SBAR). Information from the following report(s) SBAR, ED Summary and Recent Results was reviewed with the receiving nurse. Lines:   Peripheral IV 08/25/19 Right; Lower Arm (Active)   Site Assessment Clean, dry, & intact 8/25/2019  1:31 AM   Phlebitis Assessment 0 8/25/2019  1:31 AM   Infiltration Assessment 0 8/25/2019  1:31 AM   Dressing Status Clean, dry, & intact 8/25/2019  1:31 AM        Opportunity for questions and clarification was provided.       Patient transported with:   Monitor  Patient-specific medications from Pharmacy  Registered Nurse

## 2019-08-25 NOTE — ED PROVIDER NOTES
HPI     82-year-old female with a history of congestive heart failure, MI status post stents, stage IV breast cancer, diabetes, stroke, rheumatoid arthritis, presents to the ED with episode of dizziness. She states for the past month she has had some ringing in her ear. She had a dental procedure about 3 weeks ago. She was doing well today until around 10 PM tonight she went to lay down when she laid flat she developed the dizziness she describes as movement in the room. She had some slight nausea. She denies any focal weakness or numbness, change in speech or vision. Her son sat her up and her symptoms improved significantly. When he laid her back down she began again with intense dizziness so she sat back up. And they brought her here. She did have some vague discomfort in her chest although she thinks it may be more in her breast.  Family reports when she had an MI she presented with dizziness so they were concerned. She has seen ENT in the past and he did some maneuvers of her head which precipitated dizziness. She denies fever shortness of breath increasing cough nausea vomiting diarrhea illness denies urinary symptoms denies blood in bowel movements. She has had 3 bowel movements this evening which is unusual, but is not diarrhea.     Past Medical History:   Diagnosis Date    Acute on chronic systolic HF (heart failure) ([address] 5/19/2018    Age-related osteoporosis without current pathological fracture 9/2/2009    Anemia 9/2/2009    Asymptomatic hyperuricemia 2/16/2017    Breast cancer ([address]     CAD (coronary artery disease) 6/21/2018    Carotid bruit 8/31/2011    CHF (congestive heart failure) ([facility]     CKD (chronic kidney disease) stage 3, GFR 30-59 ml/min ([facility]) 10/25/2017    Colon cancer ([address] 9/2/2009    surgery/chemo    Colon cancer ([address] 9/2/2009    DM (diabetes mellitus) ([address] 9/2/2009    GERD (gastroesophageal reflux disease)     H/O: CVA 9/2/2009    slight l sided weakness    Heart [facility]     HTN, goal below 140/90 2009    Hypothyroid 2009    Ill-defined condition     seasonal allergies    Long-term use of immunosuppressant medication 2016    Metastatic breast cancer ([facility] 2018    Microalbuminuria 2009    Osteoporosis 2009    Other and unspecified hyperlipidemia 2010    PAD (peripheral artery disease) ([facility] 2011    Primary osteoarthritis of both knees 2016    Rheumatoid arthritis involving ankle ([facility] 2016    Rheumatoid arthritis(714.0) 2009    Seropositive rheumatoid arthritis of multiple sites ([facility] 2016    Statin intolerance 2016    Type 2 diabetes mellitus with neurologic complication, with long-term current use of insulin ([facility] 2009    Type 2 diabetes with nephropathy ([facility] 2018    Unspecified essential hypertension 2009    Vitamin D deficiency 2017    Weakness due to cerebrovascular accident        Past Surgical History:   Procedure Laterality Date    HX COLECTOMY      HX HYSTERECTOMY      HX OTHER SURGICAL      colonoscopies numerous since          Family History:   Problem Relation Age of Onset    Diabetes Mother     Stroke Mother     Diabetes Sister     Other Sister         fell and hit her head -  of this   [facility] Diabetes Brother     Cancer Father         stomach    Diabetes Sister        Social History     Socioeconomic History    Marital status:      Spouse name: Not on file    Number of children: Not on file    Years of education: Not on file    Highest education level: Not on file   Occupational History    Not on file   Social Needs    Financial resource strain: Not on file    Food insecurity:     Worry: Not on file     Inability: Not on file    Transportation needs:     Medical: Not on file     Non-medical: Not on file   Tobacco Use    Smoking status: Never Smoker    Smokeless tobacco: Never Used   Substance and Sexual Activity    Alcohol use: No    Drug use: No    Sexual activity: Not Currently     Partners: Male   Lifestyle    Physical activity:     Days per week: Not on file     Minutes per session: Not on file    Stress: Not on file   Relationships    Social connections:     Talks on phone: Not on file     Gets together: Not on file     Attends Zoroastrian service: Not on file     Active member of club or organization: Not on file     Attends meetings of clubs or organizations: Not on file     Relationship status: Not on file    Intimate partner violence:     Fear of current or ex partner: Not on file     Emotionally abused: Not on file     Physically abused: Not on file     Forced sexual activity: Not on file   Other Topics Concern    Not on file   Social History Narrative    Not on file         ALLERGIES: Statins-hmg-coa reductase inhibitors and Sulfa (sulfonamide antibiotics)    Review of Systems   Constitutional: Negative for fever. HENT: Positive for tinnitus. Negative for congestion. Eyes: Negative for visual disturbance. Respiratory: Negative for cough and shortness of breath. Cardiovascular: Positive for chest pain. Gastrointestinal: Positive for nausea. Negative for abdominal pain and vomiting. Genitourinary: Negative for dysuria. Musculoskeletal: Negative for gait problem. Skin: Negative for rash. Neurological: Negative for weakness, numbness and headaches. Psychiatric/Behavioral: Negative for dysphoric mood. Vitals:    08/25/19 0054 08/25/19 0123 08/25/19 0130   BP: 185/62 165/61 167/67   Pulse: 81 68 69   Resp: 16 15 18   Temp: 98 °F (36.7 °C)     SpO2: 100% 99% 100%            Physical Exam   Constitutional: She is oriented to person, place, and time. She appears well-developed and well-nourished. No distress. HENT:   Head: Normocephalic and atraumatic. Mouth/Throat: No oropharyngeal exudate. Eyes: Pupils are equal, round, and reactive to light. Right eye exhibits no discharge.  Left eye exhibits no discharge. No scleral icterus. Neck: Normal range of motion. Neck supple. No JVD present. Cardiovascular: Normal rate, regular rhythm and normal heart sounds. No murmur heard. Pulmonary/Chest: Effort normal and breath sounds normal. No stridor. No respiratory distress. She has no wheezes. She has no rales. She exhibits no tenderness. Abdominal: Soft. Bowel sounds are normal. She exhibits no distension and no mass. There is no tenderness. There is no rebound and no guarding. Musculoskeletal: Normal range of motion. Neurological: She is oriented to person, place, and time. Skin: Skin is warm and dry. Capillary refill takes less than 2 seconds. No rash noted. Psychiatric: She has a normal mood and affect. Her behavior is normal. Judgment and thought content normal.        MDM       Procedures    ED EKG interpretation:  Rhythm: normal sinus rhythm; and regular . Rate (approx.): 74; Axis: normal; P wave: normal; QRS interval: normal ; ST/T wave: non-specific changes; This EKG was interpreted by Sanket Hall MD,ED Provider. Work up reassuring. Still with dizziness which does not appear to be positional in the ED. Given stroke risk factors, will admit for further work up/MRI.

## 2019-08-25 NOTE — PROGRESS NOTES
Problem: Falls - Risk of  Goal: *Absence of Falls  Description  Document Robles Borrego Fall Risk and appropriate interventions in the flowsheet. Outcome: Progressing Towards Goal  Note:   Fall Risk Interventions:  Mobility Interventions: Communicate number of staff needed for ambulation/transfer, OT consult for ADLs, Patient to call before getting OOB, PT Consult for mobility concerns, PT Consult for assist device competence, Strengthening exercises (ROM-active/passive), Utilize walker, cane, or other assistive device         Medication Interventions: Evaluate medications/consider consulting pharmacy, Patient to call before getting OOB, Teach patient to arise slowly    Elimination Interventions: Call light in reach, Elevated toilet seat, Patient to call for help with toileting needs, Stay With Me (per policy), Toilet paper/wipes in reach, Toileting schedule/hourly rounds              Problem: Patient Education: Go to Patient Education Activity  Goal: Patient/Family Education  Outcome: Progressing Towards Goal     Problem: Pressure Injury - Risk of  Goal: *Prevention of pressure injury  Description  Document Calderon Scale and appropriate interventions in the flowsheet.   Outcome: Progressing Towards Goal  Note:   Pressure Injury Interventions:  Sensory Interventions: Assess changes in LOC, Assess need for specialty bed, Avoid rigorous massage over bony prominences, Check visual cues for pain, Discuss PT/OT consult with provider, Keep linens dry and wrinkle-free, Maintain/enhance activity level, Float heels, Minimize linen layers    Moisture Interventions: Absorbent underpads, Apply protective barrier, creams and emollients, Assess need for specialty bed, Maintain skin hydration (lotion/cream), Minimize layers    Activity Interventions: Increase time out of bed, PT/OT evaluation, Pressure redistribution bed/mattress(bed type)    Mobility Interventions: Float heels, HOB 30 degrees or less, Pressure redistribution bed/mattress (bed type), PT/OT evaluation    Nutrition Interventions: Discuss nutritional consult with provider, Offer support with meals,snacks and hydration                     Problem: Patient Education: Go to Patient Education Activity  Goal: Patient/Family Education  Outcome: Progressing Towards Goal

## 2019-08-25 NOTE — PROGRESS NOTES
Patient is diabetic. RN saw once daily humalog orders for before dinner- 2-3 units in MAR. No specifications of scale on how much to give. RN called Dr Pham Pleitez and changed order to sliding scale humalog order set.

## 2019-08-25 NOTE — PROGRESS NOTES
Bedside shift change report given to AK, RN (oncoming nurse) by Ted Waters RN (offgoing nurse). Report included the following information SBAR, Kardex, ED Summary, Procedure Summary, Intake/Output, MAR, Accordion, Recent Results and Cardiac Rhythm NSR.

## 2019-08-25 NOTE — CONSULTS
[address]    Name:  Bia Day  MR#:  [id number]  :  1935  ACCOUNT #:  [de-identified]  DATE OF SERVICE:  2019      REQUESTING PHYSICIAN:  Lori Sullivan MD    REASON FOR EVALUATION:  Dizziness. HISTORY:  The patient is an 82-year-old female with history of congestive heart failure, MI, status post end-stage IV breast cancer, on oral chemotherapy, diabetes, who says that she started to experience dizziness yesterday, and she describes this as a feeling of everything spinning around if she moves her head too quick. Denies any vomiting, but had some difficulty keeping her balance yesterday. Today, she is feeling much better, but if she rolls over or sits up, she gets a slight feeling of dizziness. Denies any focal motor sensory deficits. There is no associated headache, changes in vision or speech. No chest pain, shortness of breath, palpitations, or diaphoresis. PAST MEDICAL HISTORY:  As mentioned above. HOME MEDICATIONS:  1. Aspirin 81 mg daily. 2.  Carvedilol. 3.  Cholecalciferol. 4.  Clopidogrel. 5.  Furosemide. 6.  Levothyroxine. 7.  Repatha. 8.  Rituxan. ALLERGIES:  STATINS AND SULFA DRUGS. SOCIAL HISTORY:  The patient is , lives with her family. No history of smoking or alcohol use. FAMILY HISTORY:  Positive for diabetes in mother, sister, brother; stroke in mother; stomach cancer in father. REVIEW OF SYSTEMS:  As mentioned above. PHYSICAL EXAMINATION:  GENERAL:  The patient is alert, fully oriented. VITAL SIGNS:  Blood pressure 144/46, temperature 98.1, pulse is 74. NEUROLOGIC:  Pupils are equal, round, reactive. No nystagmus. Visual fields are intact. Face is symmetric. Hearing is baseline. Muscle tone and bulk normal.  Strength normal in both upper and lower extremities. Deep tendon reflexes 2/2 symmetric. Toes downgoing. Sensation intact. Gait exam is deferred. HEART:  Regular rate and rhythm.   CHEST: Clear.  ABDOMEN:  Soft, nontender. Positive bowel sounds. EXTREMITIES:  No edema. LABORATORY DATA:  CBC with WBC 2.2, hemoglobin 11.2, hematocrit 34.7, platelets 845. Urinalysis positive for UTI. Chemistry, sodium 134, potassium 4.1, BUN 41, creatinine 1.82, AST 25, ALT 23. MRI scan of the brain did not show any acute intracranial abnormalities. There is evidence of osseous metastases. ASSESSMENT AND PLAN:  An 82-year-old female admitted with sudden onset of dizziness. Based on history and exam, this most likely appears to be benign paroxysmal positional vertigo. Performed bedside vestibular exercises and she may continue to do these on her own. She may benefit from outpatient vestibular PT. Okay to discharge home. Please feel free to contact me with any further questions. Thank you for this consultation.       Jonathan Cash MD      [email or web address]  D:  08/25/2019 12:43  T:  08/25/2019 12:48  JOB #:  [id number]

## 2019-08-25 NOTE — DISCHARGE SUMMARY
Discharge Summary       PATIENT ID: Hannah Siemens  MRN: [id number]   YOB: 1935    DATE OF ADMISSION: 8/25/2019 12:44 AM    DATE OF DISCHARGE: 8/25/19   PRIMARY CARE PROVIDER: Juan R Byrd MD     ATTENDING PHYSICIAN: Henri Levy  DISCHARGING PROVIDER: Susan Alfredo MD    To contact this individual call [phone] and ask the  to page. If unavailable ask to be transferred the Adult Hospitalist Department. CONSULTATIONS: IP CONSULT TO HOSPITALIST  IP CONSULT TO NEUROLOGY    PROCEDURES/SURGERIES: * No surgery found *    ADMITTING [address] [place] [address] COURSE:   The patient is an 82-year-old female with past medical history of congestive heart failure, MI, status post stents, stage IV breast cancer, diabetes, stroke, and rheumatoid arthritis who presented to the emergency room with complaints of dizziness. The patient reports that she had some ringing in the ear over the past month. She had a dental procedure about 3 weeks ago. Dizziness started last night around 10:00 p.m. The patient reports associated nausea but no vomiting. According to the patient, she has dizziness whenever she turns her head to any side. She denies numbness, focal weakness, tingling sensation, speech abnormality or visual changes. There is no headache. The patient has no neck pain, neck stiffness or confusion. She denied chest pain, chest tightness, shortness of breath, dyspnea on exertion, PND or orthopnea. There is no diaphoresis, wheezing or rhonchi. The patient has no cough, nasal congestion, sore throat, epistaxis or rhinorrhea. She has no fever, chills or rigors. According to the patient, she has seen ENT in the past and he did some maneuvers of her head which precipitated dizziness. The patient has not had any recent trauma or fall. She denies lower extremity pain or swelling. DISCHARGE DIAGNOSES / PLAN:      1.   BPPV no change in MRI - d/c home with exercises  2. 2. UTI - on ceftin  3. Resume home meds      ADDITIONAL CARE RECOMMENDATIONS:     PENDING TEST RESULTS:   At the time of discharge the following test results are still pending:     FOLLOW UP APPOINTMENTS:    Follow-up Information     Follow up With Specialties Details Why Contact Janice Cleveland MD [facility] Practice In 1 week  [address]  [phone]               DIET: Regular Diet and Cardiac Diet    ACTIVITY: Activity as tolerated    WOUND CARE:     EQUIPMENT needed:       DISCHARGE MEDICATIONS:  Current Discharge Medication List      START taking these medications    Details   cefUROXime (CEFTIN) 500 mg tablet Take 1 Tab by mouth two (2) times a day for 5 days. Qty: 10 Tab, Refills: 0         CONTINUE these medications which have NOT CHANGED    Details   furosemide (LASIX) 40 mg tablet Take 1 Tab by mouth daily. Takes 40mg alternating with 80mg every other day or as advised by physician  Qty: 135 Tab, Refills: 1    Associated Diagnoses: Essential hypertension; Acute systolic heart failure (HCC)      clopidogrel (PLAVIX) 75 mg tab TAKE ONE TABLET BY MOUTH DAILY  Qty: 30 Tab, Refills: 11    Associated Diagnoses: Coronary artery disease involving native coronary artery, angina presence unspecified, unspecified whether native or transplanted heart      carvedilol (COREG) 12.5 mg tablet Take 1 Tab by mouth two (2) times a day. Qty: 60 Tab, Refills: 5    Associated Diagnoses: Essential hypertension; Acute systolic heart failure (HCC)      insulin aspart U-100 (NOVOLOG U-100 INSULIN ASPART) 100 unit/mL injection by SubCUTAneous route. Sliding scale   Indications: usually needs 2 to 3 units at dinner      insulin degludec (TRESIBA FLEXTOUCH U-100) 100 unit/mL (3 mL) inpn 14 Units by SubCUTAneous route daily. palbociclib (IBRANCE) 125 mg cap Take  by mouth daily.  Indications: 21 days on medication, 7 days off medication      acetaminophen (TYLENOL) 325 mg tablet Take 2 Tabs by mouth every four (4) hours as needed. Qty: 10 Tab, Refills: 0      b-complex with vitamin c tablet Take 1 Tab by mouth daily. Qty: 30 Tab, Refills: 0      anastrozole (ARIMIDEX) 1 mg tablet Take 1 Tab by mouth daily. Qty: 90 Tab, Refills: 0      polyethylene glycol (MIRALAX) 17 gram packet Take 1 Packet by mouth daily. Qty: 1 Each, Refills: 0      levothyroxine (SYNTHROID) 88 mcg tablet Take 88 mcg by mouth Daily (before breakfast). aspirin delayed-release 81 mg tablet Take 81 mg by mouth daily. OMEGA-3 FATTY ACIDS/FISH OIL (OMEGA 3 FISH OIL PO) Take 300 mg by mouth daily. rituximab (RITUXAN IV) 1,000 mg by IntraVENous route every 6 months. denosumab (XGEVA SC) by SubCUTAneous route every thirty (30) days. epoetin celio (PROCRIT) 10,000 unit/mL injection by SubCUTAneous route once. Unsure of dosage      REPATHA SURECLICK pen injection INJECT 1 ML SUBCUTANEOUS EVERY 14 DAYS  Qty: 2 Pen, Refills: 11    Associated Diagnoses: Mixed hyperlipidemia      nitroglycerin (NITROSTAT) 0.4 mg SL tablet 1 Tab by SubLINGual route as needed for Chest Pain. Up to 3 doses. Qty: 40 Tab, Refills: 0      CHOLECALCIFEROL, VITAMIN D3, (VITAMIN D-3 PO) Take 1,000 Units by mouth daily. NOTIFY YOUR PHYSICIAN FOR ANY OF THE FOLLOWING:   Fever over 101 degrees for 24 hours. Chest pain, shortness of breath, fever, chills, nausea, vomiting, diarrhea, change in mentation, falling, weakness, bleeding. Severe pain or pain not relieved by medications. Or, any other signs or symptoms that you may have questions about.     DISPOSITION:  xx  Home With:   OT  PT  HH  RN       Long term SNF/Inpatient Rehab    Independent/assisted living    Hospice    Other:       PATIENT CONDITION AT DISCHARGE:     Functional status    Poor    x Deconditioned     Independent      Cognition   x  Lucid     Forgetful     Dementia      Catheters/lines (plus indication)    Alford     PICC     PEG    x None      Code status     Full code x partial     PHYSICAL EXAMINATION AT DISCHARGE:  GENERAL:  On examination, the patient is seen lying in bed, in no acute respiratory distress. VITAL SIGNS:  stable  HEAD, EYES, EARS, NOSE AND THROAT:  Atraumatic, normocephalic. Anicteric. NECK:  Supple. No JVD. No carotid bruit. HEART:  Sounds one and two, regular rate and rhythm. No gallop, no friction, no rub. RESPIRATION:  No wheezing, no rhonchi, no rales. ABDOMEN:  Soft, nontender. Bowel sounds normoactive. NEURO:  Alert, oriented x3. Cranial nerves II-XII intact. SKIN:  Warm and dry. Normal skin color. Normal skin turgor. PSYCH:  Normal mood, normal affect. BACK:  No CVA tenderness. EXTREMITIES:  No edema. No cyanosis.   Normal range of motion.         CHRONIC MEDICAL DIAGNOSES:  Problem List as of 8/25/2019 Date Reviewed: 8/25/2019          Codes Class Noted - Resolved    * (Principal) Dizziness ICD-10-CM: R42  ICD-9-CM: 780.4  8/25/2019 - Present        Type 2 diabetes with nephropathy ([facility] ICD-10-CM: E11.21  ICD-9-CM: 250.40, 583.81  8/20/2018 - Present        CAD (coronary artery disease) ICD-10-CM: I25.10  ICD-9-CM: 414.00  6/21/2018 - Present        Metastatic breast cancer ([facility] ICD-10-CM: C50.919  ICD-9-CM: 174.9  6/1/2018 - Present        Acute on chronic systolic HF (heart failure) ([facility] ICD-10-CM: P64.94  ICD-9-CM: 428.23  5/19/2018 - Present        CKD (chronic kidney disease) stage 3, GFR 30-59 ml/min ([facility]) ICD-10-CM: N18.3  ICD-9-CM: 585.3  10/25/2017 - Present        Vitamin D deficiency ICD-10-CM: E55.9  ICD-9-CM: 268.9  6/16/2017 - Present        Asymptomatic hyperuricemia ICD-10-CM: E79.0  ICD-9-CM: 790.6  2/16/2017 - Present        Statin intolerance ICD-10-CM: Z78.9  ICD-9-CM: 995.27  12/21/2016 - Present        Long-term use of immunosuppressant medication ICD-10-CM: Z79.899  ICD-9-CM: V58.69  11/21/2016 - Present        Seropositive rheumatoid arthritis of multiple sites ([facility] ICD-10-CM: M05.79  ICD-9-CM: 714.0 9/28/2016 - Present        Primary osteoarthritis of both knees ICD-10-CM: M17.0  ICD-9-CM: 715.16  9/28/2016 - Present        Weakness due to cerebrovascular accident ICD-10-CM: SVB5113  ICD-9-CM: Amadou Britton  4/2/2012 - Present        PAD (peripheral artery disease) ([facility] ICD-10-CM: I73.9  ICD-9-CM: 443.9  9/7/2011 - Present        Carotid bruit ICD-10-CM: R09.89  ICD-9-CM: 785.9  8/31/2011 - Present        Hyperlipidemia ICD-10-CM: E78.5  ICD-9-CM: 272.4  1/27/2010 - Present        Type 2 diabetes mellitus with neurologic complication, with long-term current use of insulin (HCC) ICD-10-CM: E11.49, Z79.4  ICD-9-CM: 250.60, V58.67  9/2/2009 - Present        Colon cancer ([facility] ICD-10-CM: C18.9  ICD-9-CM: 153.9  9/2/2009 - Present        Age-related osteoporosis without current pathological fracture ICD-10-CM: M81.0  ICD-9-CM: 733.01  9/2/2009 - Present        HTN, goal below 140/90 ICD-10-CM: I10  ICD-9-CM: 401.9  9/2/2009 - Present        Anemia ICD-10-CM: D64.9  ICD-9-CM: 285.9  9/2/2009 - Present        RESOLVED: Acute respiratory failure ([facility] ICD-10-CM: J96.00  ICD-9-CM: 518.81  6/21/2018 - 8/8/2019        RESOLVED: Elevated troponin ICD-10-CM: R74.8  ICD-9-CM: 790.6  6/21/2018 - 8/8/2019        RESOLVED: Acute renal failure superimposed on stage 3 chronic kidney disease ([facility] ICD-10-CM: N17.9, N18.3  ICD-9-CM: 584.9, 585.3  6/21/2018 - 2/18/2019        RESOLVED: Gout flare ICD-10-CM: M10.9  ICD-9-CM: 274.01  6/21/2018 - 8/8/2019        RESOLVED: Hyperglycemia due to type 2 diabetes mellitus ([facility] ICD-10-CM: E11.65  ICD-9-CM: 250.00  6/21/2018 - 8/8/2019        RESOLVED: Fluid overload ICD-10-CM: E87.70  ICD-9-CM: 276.69  6/1/2018 - 6/18/2018        RESOLVED: Goals of care, counseling/discussion ICD-10-CM: Z71.89  ICD-9-CM: V65.49  6/1/2018 - 8/8/2019        RESOLVED: Acute systolic heart failure ([facility] ICD-10-CM: I50.21  ICD-9-CM: 428.21  4/24/2018 - 8/8/2019        RESOLVED: Altered mental status, unspecified ICD-10-CM: R41.82  ICD-9-CM: 780.97  4/23/2018 - 8/8/2019        RESOLVED: SOB (shortness of breath) ICD-10-CM: R06.02  ICD-9-CM: 786.05  4/16/2018 - 7/4/2018        RESOLVED: Occlusion and stenosis of carotid artery without mention of cerebral infarction ICD-10-CM: I65.29  ICD-9-CM: 433.10  8/23/2013 - 8/8/2019        RESOLVED: Neuropathy in diabetes ([facility] ICD-10-CM: E11.40  ICD-9-CM: 250.60, 357.2  4/2/2012 - 8/8/2019        RESOLVED: Claudication ([facility] ICD-10-CM: I73.9  ICD-9-CM: 443.9  8/31/2011 - 8/8/2019        RESOLVED: Weakness of left arm ICD-10-CM: R29.898  ICD-9-CM: 729.89  8/31/2011 - 8/8/2019        RESOLVED: Hypothyroid ICD-10-CM: E03.9  ICD-9-CM: 244.9  9/2/2009 - 8/8/2019        RESOLVED: H/O: CVA ICD-9-CM: V12.54  9/2/2009 - 8/8/2019        RESOLVED: Microalbuminuria ICD-10-CM: R80.9  ICD-9-CM: 791.0  9/2/2009 - 8/8/2019              Greater than 30  minutes were spent with the patient on counseling and coordination of care    Signed:   Nini Fitch MD  8/25/2019  1:05 PM

## 2019-08-25 NOTE — ED TRIAGE NOTES
Arrived from home, wheeled from triage, reporting Dizziness 2200 yesterday evening, worse when laying down. Also reports nausea, bilateral hand tingling due to arthritis. Hx stroke 2001, residual left sided weakness.

## 2019-08-25 NOTE — PROGRESS NOTES
Bedside shift change report given to Usman Anna RN (oncoming nurse) by Rylie Jarrett RN and Kyra Peoples RN (offgoing nurse). Report included the following information SBAR, Kardex and MAR.

## 2019-08-25 NOTE — PROGRESS NOTES
I have reviewed discharge instructions with the patient and adult child. The patient and adult child verbalized understanding. Reviewed increase in water intake and antibiotic compliance for treatment of UTI. Patient taken down for discharge with son via wheelchair.

## 2019-08-26 NOTE — ACP (ADVANCE CARE PLANNING)
[address]  ====First Steps® Advance Care Planning Conversation====     Prior to today's conversation, did individual have existing ACP documents? [] Yes  [x] No  If Yes, type of document:     Date of conversation: 19  Location:[facility]    Participants: (in person or by phone)   [x] Patient    [x] Prospective agent (not yet named in a document)    Name: [place]    Relationship to patient:son       [] Healthcare agent (already designated in prior ACP document)    Name:     Relationship to patient:       [] Other:  Name:       Individual's Fears/Concerns about planning: none     Goals/Narrative of Conversation:   Explained to patient what a Medical Directive means-how to choose an agent and discussed how she feels about \"living well. \" Reviewed the educational info, discussed effectiveness of medical treatments at end of life. Talked about past experiences with her  who  about 2 years ago from pancreatic cancer. She decided to take the information home and read through it again. Advised to have her son call me to schedule a follow up appointment. Conversation topics for Individual    Has learned the following from prior experiences with serious illness: helps your family if you have talked about what your wishes would be. Identifies the following as important for living well: not needing machines to live. \"What personal beliefs (if any) do you have that might help you choose the care you want, or do not want? \"  Patient response: none    Conversation topics for Agent / Yesika Colon agent's understanding of the role: yes    Agent confirms Willingness to:   [x]Yes []No Accept role   [x] Yes []No Talk with individual about his/her goals, values, & preferences   [x] Yes [] No   Follow individual's decisions, even if do not agree with the decisions   [x]Yes  []No Make decisions in difficult moments    CPR-related information for ALL patients    [x] Yes [] No   Educated patient regarding differences between AMD and DDNR      Questions Only for Individuals with Chronic Illness:  [x] N/A    \"What do you understand about your (name of illness) and the complications that may occur? \"  Patient response:    \"What do you understand about CPR? \" Patient response:    \"What would be an unacceptable outcome of CPR to you? \" Patient response:      [] Yes  [] No   If patient requested DDNR order, referred to provider for follow-up    First Steps® ACP Facilitator: Nichelle Cornelius RN    Length (minutes): 39      The following was provided (check all that apply):     [x] Written information on the planning process        Healthcare Decision Information Cards:   [x] Help with Breathing Facts   [x] Tube Feeding Facts   [x] CPR Facts      [x] Review of advance directive form   [] Review of existing advance directive       Meeting Outcomes:   [x] ACP discussion completed   [] Advance directive form completed   [] Review & validation of existing AMD completed   [] Original of completed advance directive given to patient   [] Copy given to/for healthcare agent / others   [] Copy scanned to electronic medical record    If ACP discussion not completed, last interview topic discussed: conversation was completed. Patient does want to take info home to review again.      Follow-up plan:     [x] Schedule follow-up conversation to continue planning   [] Referred individual to provider for additional questions/concerns    [] Individual will invite agent/prospective agent to next ACP appointment   [x] Recommended to review completed AMD annually or upon change in health status     [x] This note routed to one or more involved healthcare providers

## 2019-08-26 NOTE — PROGRESS NOTES
Pharmacist Discharge Medication Reconciliation    Discharging Provider: Dr. Bernard Bee PMH:   Past Medical History:   Diagnosis Date    Acute on chronic systolic HF (heart failure) ([address] 5/19/2018    Age-related osteoporosis without current pathological fracture 9/2/2009    Anemia 9/2/2009    Asymptomatic hyperuricemia 2/16/2017    Breast cancer (HCC)     CAD (coronary artery disease) 6/21/2018    Carotid bruit 8/31/2011    CHF (congestive heart failure) ([facility])     CKD (chronic kidney disease) stage 3, GFR 30-59 ml/min ([facility]) 10/25/2017    Colon cancer ([address] 9/2/2009    surgery/chemo    Colon cancer ([address] 9/2/2009    DM (diabetes mellitus) ([address] 9/2/2009    GERD (gastroesophageal reflux disease)     H/O: CVA 9/2/2009    slight l sided weakness    Heart attack ([address]     HTN, goal below 140/90 9/2/2009    Hypothyroid 9/2/2009    Ill-defined condition     seasonal allergies    Long-term use of immunosuppressant medication 11/21/2016    Metastatic breast cancer ([address] 6/1/2018    Microalbuminuria 9/2/2009    Osteoporosis 9/2/2009    Other and unspecified hyperlipidemia 1/27/2010    PAD (peripheral artery disease) ([address] 9/7/2011    Primary osteoarthritis of both knees 9/28/2016    Rheumatoid arthritis involving ankle ([address] 9/28/2016    Rheumatoid arthritis(714.0) 9/2/2009    Seropositive rheumatoid arthritis of multiple sites ([address] 9/28/2016    Statin intolerance 12/21/2016    Type 2 diabetes mellitus with neurologic complication, with long-term current use of insulin ([address] 9/2/2009    Type 2 diabetes with nephropathy ([address] 8/20/2018    Unspecified essential hypertension 9/2/2009    Vitamin D deficiency 6/16/2017    Weakness due to cerebrovascular accident      Chief Complaint for this Admission:   Chief Complaint   Patient presents with    Dizziness     Allergies: Statins-hmg-coa reductase inhibitors and Sulfa (sulfonamide antibiotics)    Discharge Medications:   Discharge Medication List as of 8/25/2019  2:03 PM        START taking these medications    Details   cefUROXime (CEFTIN) 500 mg tablet Take 1 Tab by mouth two (2) times a day for 5 days. , Print, Disp-10 Tab, R-0           CONTINUE these medications which have NOT CHANGED    Details   furosemide (LASIX) 40 mg tablet Take 1 Tab by mouth daily. Takes 40mg alternating with 80mg every other day or as advised by physician, Normal, Disp-135 Tab, R-1      clopidogrel (PLAVIX) 75 mg tab TAKE ONE TABLET BY MOUTH DAILY, Normal, Disp-30 Tab, R-11      carvedilol (COREG) 12.5 mg tablet Take 1 Tab by mouth two (2) times a day., Normal, Disp-60 Tab, R-5      insulin aspart U-100 (NOVOLOG U-100 INSULIN ASPART) 100 unit/mL injection by SubCUTAneous route. Sliding scale   Indications: usually needs 2 to 3 units at dinner, Historical Med      insulin degludec (TRESIBA FLEXTOUCH U-100) 100 unit/mL (3 mL) inpn 14 Units by SubCUTAneous route daily. , Historical Med      palbociclib (IBRANCE) 125 mg cap Take  by mouth daily. Indications: 21 days on medication, 7 days off medication, Historical Med      acetaminophen (TYLENOL) 325 mg tablet Take 2 Tabs by mouth every four (4) hours as needed. , Print, Disp-10 Tab, R-0      b-complex with vitamin c tablet Take 1 Tab by mouth daily. , No Print, Disp-30 Tab, R-0      anastrozole (ARIMIDEX) 1 mg tablet Take 1 Tab by mouth daily. , Print, Disp-90 Tab, R-0      polyethylene glycol (MIRALAX) 17 gram packet Take 1 Packet by mouth daily. , Print, Disp-1 Each, R-0      levothyroxine (SYNTHROID) 88 mcg tablet Take 88 mcg by mouth Daily (before breakfast). , Historical Med      aspirin delayed-release 81 mg tablet Take 81 mg by mouth daily. , Historical Med      OMEGA-3 FATTY ACIDS/FISH OIL (OMEGA 3 FISH OIL PO) Take 300 mg by mouth daily. , Historical Med      rituximab (RITUXAN IV) 1,000 mg by IntraVENous route every 6 months., Historical Med      denosumab (XGEVA SC) by SubCUTAneous route every thirty (30) days. , Historical Med      epoetin celio (PROCRIT) 10,000 unit/mL injection by SubCUTAneous route once. Unsure of dosage, Historical Med      REPATHA SURECLICK pen injection INJECT 1 ML SUBCUTANEOUS EVERY 14 DAYS, Normal, Disp-2 Pen, R-11      nitroglycerin (NITROSTAT) 0.4 mg SL tablet 1 Tab by SubLINGual route as needed for Chest Pain. Up to 3 doses. , No Print, Disp-40 Tab, R-0      CHOLECALCIFEROL, VITAMIN D3, (VITAMIN D-3 PO) Take 1,000 Units by mouth daily. , Historical Med             The patient's chart, MAR and AVS were reviewed by Meaghan Irwin.

## 2019-08-26 NOTE — PROGRESS NOTES
Patient came in to meet with [facility]. facilitator to discuss ACP and adult day care centers. She was in a wheelchair and accompanied by her son,Robin, and an aide. See ACP note. Gave son info on several day care centers in the area with their contact info so he can visit or call to discuss. He also mentioned that she had been in the ER yesterday for dizziness. Diagnosed with BBPV-advised to go to PT for vestibular therapy. Also given Ceftin for UTI. Says she feels much better today, denies dizziness at this time. Son disappointed that our [employer]  PT at [facility] does not offer the vestibular therapy. Will have to go to [facility] about 1/2 hour away for treatment. Advised that I would be glad to research and see if I could find closer location. Did find that [facility]) and [facility] both offer the vestibular therapy. Contact number for both is [phone]. Son notified.

## 2019-09-04 NOTE — PROGRESS NOTES
PT INITIAL EVALUATION NOTE 2-15    Patient Name: Zo Coffey  Date:2019  : 1935  [x]  Patient  Verified  Payor: [facility] MEDICAID / Plan: [facility] [employer] / Product Type: Managed Care Medicaid /    In time:3:30 PM  Out time:4:30 PM  Total Treatment Time (min): 60  Visit #: 1     Treatment Area: Vertigo [R42]    SUBJECTIVE  Pain Level (0-10 scale): 4/10 arthritis in my  Hands, \"I have trouble holding things in my left hand\"  \"same thing in my feet\"  Any medication changes, allergies to medications, adverse drug reactions, diagnosis change, or new procedure performed?: [] No    [x] Yes (see summary sheet for update)  Subjective:     Pt reports she saw the ENT who diagnosed her with vertigo. She had to go the ER. Pt has been sedentary for 1.5 years. Pt was hospitalized 2018 to August.  She had a heart Attack. She has congestive heart failure. Pt uses a rollator at home. Pt is unable to use the rollator outside the house. Pt requires assistance to leave the house. Pt is not having dizziness often. Pt is no longer having trouble with vertigo, \"just dizzy spells\"  \"weaker is specifically weaker on her left side, CVA in \"    PLOF: Pt enjoys spending time with her family, going to the mall  Mechanism of Injury: Insidious onset  Previous Treatment/Compliance: None  PMHx/Surgical Hx:  sleep apnea, allergies, arthritis, [place] CHF, DM, HTN, [place], OP, CVA  Work Hx: Not working  Living Situation: Pt lives with her son, she has 5 stairs to enter the home. Pt Goals:  \"To be able to walk around and lift things\"  Barriers: chronic nature of condition, inactive lifestyle  Motivation: good  Substance use: none   Cognition: A & O x 3        OBJECTIVE/EXAMINATION  Gait:  Pt ambulates with RW, decreased beverly                               LOWER QUARTER   MUSCLE STRENGTH  KEY       R  L  0 - No Contraction  L1, L2 Psoas  4  4-    1 - Trace   L3 Quads  4  4-    2 - Poor   L4 Tib Ant  4  4-    3 - Fair    L5 EHL  -  -    4 - Good   S1 FHL  4  4-     5 - Normal   S2 Hams  4  4-         Strength: R 24 psi L 16.5 lbs    Vision:     Smooth Pursuit: saccades present   Gaze stabilization: Difficulty keeping focus   Saccades:  WNL    Balance/ Equilibrium:         Standing Balance:    Static: 30 s EC 10 s   [place] s    Functional Mobility          Transfers:       Sit-Stand:  Pt requires CGA with B UE      Other:      TUG 45 s with RW      15 min Therapeutic Exercise:  [x] See flow sheet :   Rationale: increase ROM, increase strength and improve coordination to improve the patients ability to sit, stand, transfer, ambulate, lift, carry, reach, complete ADLs        With   [x] TE   [] TA   [] neuro   [] other: Patient Education: [x] Review HEP    [] Progressed/Changed HEP based on:   [x] positioning   [x] body mechanics   [] transfers   [x] heat/ice application    [] other:        Pain Level (0-10 scale) post treatment: 0      ASSESSMENT:      [x]  See Plan of [address] [place] [address], PT 2019

## 2019-09-04 NOTE — PROGRESS NOTES
[address] [place]  Phone: [phone]  Fax: [phone]    Plan of Care/ Statement of Necessity for Physical Therapy Services 2-15    Patient name: Lian Moss  : 1935  Provider#: [id number]  Referral source: Danuta Gonzalez MD      Medical/Treatment Diagnosis: Vertigo [R42]     Prior Hospitalization: see medical history     Comorbidities:   sleep apnea, allergies, arthritis, [place] CHF, DM, HTN, [place], OP, CVA  Prior Level of Function: Pt enjoys spending time with her family, going to the mall  Medications: Verified on Patient Summary List    Start of Care: 19      Onset Date: 1.5 years ago       The Plan of Care and following information is based on the information from the initial evaluation. Assessment/ key information: The patient presents with generalized weakness (especially on the left side), imbalance and poor ambulation tolerance, affecting her ability to stand, perform ADLs, ambulate outside the home and transfer. The patient's condition is complicated by chronic nature of condition and inactive lifestyle.     Evaluation Complexity History HIGH Complexity :3+ comorbidities / personal factors will impact the outcome/ POC ; Examination HIGH Complexity : 4+ Standardized tests and measures addressing body structure, function, activity limitation and / or participation in recreation  ;Presentation LOW Complexity : Stable, uncomplicated  ;Clinical Decision Making MEDIUM Complexity : FOTO score of 26-74  Overall Complexity Rating: LOW     Problem List: pain affecting function, decrease ROM, decrease strength, impaired gait/ balance, decrease ADL/ functional abilitiies, decrease activity tolerance, decrease flexibility/ joint mobility and decrease transfer abilities   Treatment Plan may include any combination of the following: Therapeutic exercise, Therapeutic activities, Neuromuscular re-education, Physical agent/modality, Gait/balance training, Manual therapy, Patient education and Functional mobility training  Patient / Family readiness to learn indicated by: asking questions, trying to perform skills and interest  Persons(s) to be included in education: patient (P)  Barriers to Learning/Limitations: None  Patient Goal (s): To be able to walk around and lift things\"  Patient Self Reported Health Status: good  Rehabilitation Potential: excellent    Short Term Goals: To be accomplished in 4 weeks:  1) The patient will be independent with introductory HEP  2) The patient will demonstrate ability to hold NBOS EO for 30 s without LOB to improve static stability  3) The patient will demonstrate ability to hold WBOS EC 30 seconds to indicate decreased risk of falls  Long Term Goals: To be accomplished in 12 weeks:  1) The patient will be independent with finalized HEP  2) The patient will improve LE strength to 4/5 B to improve ease with household chores  3) The patient will improve TUG score to 30 seconds or less to indicate improved functional mobility  Frequency / Duration: Patient to be seen 1-2 times per week for 12 weeks. Patient/ Caregiver education and instruction: self care, activity modification and exercises    [x]  Plan of care has been reviewed with BELEN Evans, PT 9/4/2019     ________________________________________________________________________    I certify that the above Therapy Services are being furnished while the patient is under my care. I agree with the treatment plan and certify that this therapy is necessary.     [address] [facility] Signature:____________________  Date:____________Time: _________

## 2019-09-19 NOTE — PROGRESS NOTES
Patient Name: Abbi Samson MRN: [id number] SUBJECTIVE Abbi Samson is a 82 y.o. female who presents with the following: Here to establish care with new PCP. Here with sons. Patient currently sees multiple doctors for stage IV breast cancer, chronic heart failure, diabetes, chronic kidney disease. Sons' main concern is she has gained weight over the past few months, likely due to being sedentary. She lives at home and is unable to transport to places herself. They are interested in enrolling her in adult  centers as well as physical therapy for balance training and strengthening. Review of Systems Constitutional: Negative for chills, fever, malaise/fatigue and weight loss. HENT: Negative for hearing loss, nosebleeds and sore throat. Respiratory: Negative for cough, sputum production, shortness of breath and wheezing. Cardiovascular: Negative for chest pain, palpitations, leg swelling and PND. Gastrointestinal: Negative for abdominal pain, blood in stool, constipation, diarrhea, nausea and vomiting. Genitourinary: Negative for dysuria, frequency and urgency. Musculoskeletal: Negative for back pain, falls, joint pain, myalgias and neck pain. Skin: Negative for itching and rash. Neurological: Positive for weakness. Negative for dizziness, sensory change, focal weakness and loss of consciousness. Psychiatric/Behavioral: Negative for depression. The patient is not nervous/anxious. All other systems reviewed and are negative. The patient's medications, allergies, past medical history, surgical history, family history and social history were reviewed and updated where appropriate. Prior to Admission medications Medication Sig Start Date End Date Taking? Authorizing Provider  
furosemide (LASIX) 40 mg tablet Take 1 Tab by mouth daily.  Takes 40mg alternating with 80mg every other day or as advised by physician 6/25/19  Yes Andie Pagan MD  
 clopidogrel (PLAVIX) 75 mg tab TAKE ONE TABLET BY MOUTH DAILY 5/6/19  Yes Afshan Landers MD  
carvedilol (COREG) 12.5 mg tablet Take 1 Tab by mouth two (2) times a day. 4/12/19  Yes Afshan Landers MD  
insulin aspart U-100 (NOVOLOG U-100 INSULIN ASPART) 100 unit/mL injection by SubCUTAneous route. Sliding scale   Yes Provider, Historical  
insulin degludec (TRESIBA FLEXTOUCH U-100) 100 unit/mL (3 mL) inpn 14 Units by SubCUTAneous route daily. Yes Provider, Historical  
denosumab (XGEVA SC) by SubCUTAneous route every thirty (30) days. Yes Provider, Historical  
epoetin celio (PROCRIT) 10,000 unit/mL injection by SubCUTAneous route once. Unsure of dosage   Yes Provider, Historical  
REPATHA SURECLICK pen injection INJECT 1 ML SUBCUTANEOUS EVERY 14 DAYS 1/31/19  Yes Afshan Landers MD  
palbociclib [facility] NO 5) 125 mg cap Take  by mouth daily. Indications: 21 days on medication, 7 days off medication   Yes Provider, Historical  
acetaminophen (TYLENOL) 325 mg tablet Take 2 Tabs by mouth every four (4) hours as needed. 6/21/18  Yes Magnolia Rodriguez MD  
b-complex with vitamin c tablet Take 1 Tab by mouth daily. 6/22/18  Yes Magnolia Rodriguez MD  
nitroglycerin (NITROSTAT) 0.4 mg SL tablet 1 Tab by SubLINGual route as needed for Chest Pain. Up to 3 doses. 6/6/18  Yes Steve Smith MD  
anastrozole (ARIMIDEX) 1 mg tablet Take 1 Tab by mouth daily. 5/12/18  Yes Dasha Ceja MD  
polyethylene glycol (MIRALAX) 17 gram packet Take 1 Packet by mouth daily. 5/12/18  Yes Dasha Ceja MD  
levothyroxine (SYNTHROID) 88 mcg tablet Take 88 mcg by mouth Daily (before breakfast). Yes Provider, Historical  
aspirin delayed-release 81 mg tablet Take 81 mg by mouth daily. Yes Provider, Historical  
OMEGA-3 FATTY ACIDS/FISH OIL (OMEGA 3 FISH OIL PO) Take 300 mg by mouth daily.    Yes Provider, Historical  
CHOLECALCIFEROL, VITAMIN D3, (VITAMIN D-3 PO) Take 1,000 Units by mouth daily. Yes Provider, Historical  
 
 
Allergies Allergen Reactions  Statins-Hmg-Coa Reductase Inhibitors Other (comments) Intolerant to statins  Sulfa (Sulfonamide Antibiotics) Other (comments)  
  syncope Past Medical History:  
Diagnosis Date  Acute on chronic systolic HF (heart failure) ([facility] 5/19/2018  Age-related osteoporosis without current pathological fracture 9/2/2009  Anemia 9/2/2009  Asymptomatic hyperuricemia 2/16/2017  Breast cancer ([facility] [address]  CAD (coronary artery disease) 6/21/2018  Carotid bruit 8/31/2011  CHF (congestive heart failure) ([facility] [facility]  CKD (chronic kidney disease) stage 3, GFR 30-59 ml/min ([facility]) 10/25/2017  Colon cancer ([address] 9/2/2009  
 surgery/chemo  Colon cancer ([facility] [address] 9/2/2009  DM (diabetes mellitus) ([address] 9/2/2009  GERD (gastroesophageal reflux disease)  H/O: CVA 9/2/2009  
 slight l sided weakness  Heart attack ([address]  HTN, goal below 140/90 9/2/2009  Hypothyroid 9/2/2009  Ill-defined condition   
 seasonal allergies  Long-term use of immunosuppressant medication 11/21/2016  Metastatic breast cancer ([address] 6/1/2018  Microalbuminuria 9/2/2009  Osteoporosis 9/2/2009  Other and unspecified hyperlipidemia 1/27/2010  PAD (peripheral artery disease) ([address] 9/7/2011  Primary osteoarthritis of both knees 9/28/2016  Rheumatoid arthritis involving ankle ([address] 9/28/2016  Rheumatoid arthritis(714.0) 9/2/2009  Seropositive rheumatoid arthritis of multiple sites ([address] 9/28/2016  Statin intolerance 12/21/2016  Type 2 diabetes mellitus with neurologic complication, with long-term current use of insulin ([address] 9/2/2009  Type 2 diabetes with nephropathy ([address] 8/20/2018  Unspecified essential hypertension 9/2/2009  Vitamin D deficiency 6/16/2017  Weakness due to cerebrovascular accident Past Surgical History:  
Procedure Laterality Date  HX COLECTOMY  HX HYSTERECTOMY  HX OTHER SURGICAL    
 colonoscopies numerous since  Family History Problem Relation Age of Onset  Diabetes Mother  Stroke Mother  Diabetes Sister  Other Sister   
     fell and hit her head -  of this  Diabetes Brother  Cancer Father   
     stomach  Diabetes Sister Social History Socioeconomic History  Marital status:  Spouse name: Not on file  Number of children: Not on file  Years of education: Not on file  Highest education level: Not on file Occupational History  Not on file Social Needs  Financial resource strain: Not on file  Food insecurity:  
  Worry: Not on file Inability: Not on file  Transportation needs:  
  Medical: Not on file Non-medical: Not on file Tobacco Use  Smoking status: Never Smoker  Smokeless tobacco: Never Used Substance and Sexual Activity  Alcohol use: No  
 Drug use: No  
 Sexual activity: Not Currently Partners: Male Lifestyle  Physical activity:  
  Days per week: Not on file Minutes per session: Not on file  Stress: Not on file Relationships  Social connections:  
  Talks on phone: Not on file Gets together: Not on file Attends [employer] service: Not on file Active member of club or organization: Not on file Attends meetings of clubs or organizations: Not on file Relationship status: Not on file  Intimate partner violence:  
  Fear of current or ex partner: Not on file Emotionally abused: Not on file Physically abused: Not on file Forced sexual activity: Not on file Other Topics Concern  Not on file Social History Narrative  Not on file OBJECTIVE Visit Vitals /54 (BP 1 Location: Right arm, BP Patient Position: Sitting) Pulse 69 Temp 98.4 °F (36.9 °C) (Oral) Resp 18 Ht 5' 5\" (1.651 m) Wt 129 lb 6.4 oz (58.7 kg) SpO2 97% BMI 21.53 kg/m² Physical Exam  
 Constitutional: She is oriented to person, place, and time and well-developed, well-nourished, and in no distress. No distress. Eyes: Pupils are equal, round, and reactive to light. Conjunctivae and EOM are normal.  
Cardiovascular: Normal rate, regular rhythm and normal heart sounds. Exam reveals no gallop and no friction rub. No murmur heard. Pulmonary/Chest: Effort normal and breath sounds normal. No respiratory distress. She has no wheezes. Musculoskeletal: She exhibits no edema. Neurological: She is alert and oriented to person, place, and time. Skin: Skin is warm and dry. No rash noted. She is not diaphoretic. Psychiatric: Mood, memory, affect and judgment normal.  
Nursing note and vitals reviewed. ASSESSMENT AND PLAN Luli Yung is a 82 y.o. female who presents today for: 
 
1. Encounter for Medicare annual wellness exam 
See other note. 2. Gait instability Will refer to PT. Will ask NN to reach out to family re: adult day center. 
- REFERRAL TO PHYSICAL THERAPY 3. Type 2 diabetes mellitus without complication, with long-term current use of insulin ([facility] [address] Will request records. 4. Malignant neoplasm of colon, unspecified part of colon ([address] 5. PAD (peripheral artery disease) ([address] 6. Seropositive rheumatoid arthritis of multiple sites ([address] 7. CKD (chronic kidney disease) stage 3, GFR 30-59 ml/min (HCC) 8. Acute on chronic systolic HF (heart failure) ([address] 9. Metastatic breast cancer ([address] 10. Type 2 diabetes with nephropathy (HCC) Medications Discontinued During This Encounter Medication Reason  folic acid (FOLVITE) 1 mg tablet  leflunomide (ARAVA) 20 mg tablet Follow-up and Dispositions · Return in about 3 months (around 11/8/2019) for Medicare Wellness Visit (30 min). Medication risks/benefits/costs/interactions/alternatives discussed with patient.  
Advised patient to call back or return to office if symptoms worsen/change/persist. If patient cannot reach us or should anything more severe/urgent arise he/she should proceed directly to the nearest emergency department. Discussed expected course/resolution/complications of diagnosis in detail with patient. Patient given a written after visit summary which includes his/her diagnoses, current medications and vitals. Patient expressed understanding with the diagnosis and plan. Yves Jameson M.D. Diagnoses and all orders for this visit: 1. Encounter for Medicare annual wellness exam 
 
2. Gait instability 
-     REFERRAL TO PHYSICAL THERAPY 3. Type 2 diabetes mellitus without complication, with long-term current use of insulin ([facility] [address] Assessment & Plan: This condition is managed by Specialist. 
Key Antihyperglycemic Medications   
    
  
 insulin aspart U-100 (NOVOLOG U-100 INSULIN ASPART) 100 unit/mL injection (Taking) by SubCUTAneous route. Sliding scale   Indications: usually needs 2 to 3 units at dinner  
 insulin degludec (TRESIBA FLEXTOUCH U-100) 100 unit/mL (3 mL) inpn (Taking) 14 Units by SubCUTAneous route daily. Other Key Diabetic Medications OMEGA-3 FATTY ACIDS/FISH OIL (OMEGA 3 FISH OIL PO) (Taking) Take 300 mg by mouth daily. Lab Results Component Value Date/Time Hemoglobin A1c 8.0 07/02/2018 09:40 AM  
 Glucose 213 07/14/2018 06:16 PM  
 Creatinine 1.43 07/14/2018 06:16 PM  
 Cholesterol, total 74 04/16/2018 04:21 AM  
 HDL Cholesterol 25 04/16/2018 04:21 AM  
 LDL, calculated 31.6 04/16/2018 04:21 AM  
 Triglyceride 87 04/16/2018 04:21 AM  
 
Diabetic Foot and Eye Exam HM Status Topic Date Due  
 Diabetic Foot Care  02/21/1945  Eye Exam  02/21/1945  
 
 
 
4. Malignant neoplasm of colon, unspecified part of colon ([facility] [address] Assessment & Plan: This condition is managed by Specialist. 
Key Oncology Meds   
    
  
 palbociclib Yvewesly Hernandez) 125 mg cap (Taking) Take  by mouth daily. Indications: 21 days on medication, 7 days off medication  
 anastrozole (ARIMIDEX) 1 mg tablet (Taking) Take 1 Tab by mouth daily. Key Pain Meds   
    
  
 acetaminophen (TYLENOL) 325 mg tablet (Taking) Take 2 Tabs by mouth every four (4) hours as needed. Lab Results Component Value Date/Time WBC 3.3 07/14/2018 06:16 PM  
 ABS. NEUTROPHILS 1.2 07/14/2018 06:16 PM  
 HGB 10.8 07/14/2018 06:16 PM  
 HCT 34.6 07/14/2018 06:16 PM  
 PLATELET 833 84/52/7093 06:16 PM  
 Creatinine 1.43 07/14/2018 06:16 PM  
 BUN 43 07/14/2018 06:16 PM  
 ALT (SGPT) 21 07/14/2018 06:16 PM  
 AST (SGOT) 21 07/14/2018 06:16 PM  
 Albumin 2.8 07/14/2018 06:16 PM  
 
 
 
5. PAD (peripheral artery disease) ([facility] Assessment & Plan: This condition is managed by Specialist. 
Key Peripheral Vascular Disease Meds   
    
  
 clopidogrel (PLAVIX) 75 mg tab (Taking) TAKE ONE TABLET BY MOUTH DAILY  
 aspirin delayed-release 81 mg tablet (Taking) Take 81 mg by mouth daily. OMEGA-3 FATTY ACIDS/FISH OIL (OMEGA 3 FISH OIL PO) (Taking) Take 300 mg by mouth daily. Lab Results Component Value Date/Time WBC 3.3 07/14/2018 06:16 PM  
 HGB 10.8 07/14/2018 06:16 PM  
 HCT 34.6 07/14/2018 06:16 PM  
 PLATELET 548 01/90/8666 06:16 PM  
 Creatinine 1.43 07/14/2018 06:16 PM  
 BUN 43 07/14/2018 06:16 PM  
 INR 1.1 05/19/2018 03:10 AM  
 Prothrombin time 11.4 05/19/2018 03:10 AM  
 Cholesterol, total 74 04/16/2018 04:21 AM  
 HDL Cholesterol 25 04/16/2018 04:21 AM  
 LDL, calculated 31.6 04/16/2018 04:21 AM  
 Triglyceride 87 04/16/2018 04:21 AM  
 
 
 
6. Seropositive rheumatoid arthritis of multiple sites ([facility] Assessment & Plan: 
 
Lab Results Component Value Date/Time  WBC 3.3 07/14/2018 06:16 PM  
 HGB 10.8 07/14/2018 06:16 PM  
 HCT 34.6 07/14/2018 06:16 PM  
 PLATELET 786 60/26/3233 06:16 PM  
 Creatinine 1.43 07/14/2018 06:16 PM  
 BUN 43 07/14/2018 06:16 PM  
 Potassium 3.7 07/14/2018 06:16 PM  
 [Joint Pain] : joint pain INR 1.1 05/19/2018 03:10 AM  
 Prothrombin time 11.4 05/19/2018 03:10 AM  
 
 
 
7. CKD (chronic kidney disease) stage 3, GFR 30-59 ml/min ([facility]) Assessment & Plan: This condition is managed by Specialist.  
Key CKD Meds   
    
  
 furosemide (LASIX) 40 mg tablet (Taking) Take 1 Tab by mouth daily. Takes 40mg alternating with 80mg every other day or as advised by physician  
 epoetin celio (PROCRIT) 10,000 unit/mL injection (Taking) by SubCUTAneous route once. Unsure of dosage CHOLECALCIFEROL, VITAMIN D3, (VITAMIN D-3 PO) (Taking) Take 1,000 Units by mouth daily. Lab Results Component Value Date/Time GFR est non-AA 35 07/14/2018 06:16 PM  
 GFR est AA 43 07/14/2018 06:16 PM  
 Creatinine 1.43 07/14/2018 06:16 PM  
 BUN 43 07/14/2018 06:16 PM  
 Sodium 135 07/14/2018 06:16 PM  
 Potassium 3.7 07/14/2018 06:16 PM  
 Chloride 98 07/14/2018 06:16 PM  
 CO2 27 07/14/2018 06:16 PM  
 Calcium 8.6 07/14/2018 06:16 PM  
 Magnesium 1.5 07/06/2018 08:36 AM  
 Phosphorus 3.8 06/20/2018 03:00 AM  
 
CrCl cannot be calculated (Patient's most recent lab result is older than the maximum 180 days allowed. ). 8. Acute on chronic systolic HF (heart failure) ([address] Assessment & Plan: This condition is managed by Specialist. 
Key CAD CHF Meds   
    
  
 furosemide (LASIX) 40 mg tablet (Taking) Take 1 Tab by mouth daily. Takes 40mg alternating with 80mg every other day or as advised by physician  
 clopidogrel (PLAVIX) 75 mg tab (Taking) TAKE ONE TABLET BY MOUTH DAILY  
 carvedilol (COREG) 12.5 mg tablet (Taking) Take 1 Tab by mouth two (2) times a day. nitroglycerin (NITROSTAT) 0.4 mg SL tablet (Taking) 1 Tab by SubLINGual route as needed for Chest Pain. Up to 3 doses. aspirin delayed-release 81 mg tablet (Taking) Take 81 mg by mouth daily. OMEGA-3 FATTY ACIDS/FISH OIL (OMEGA 3 FISH OIL PO) (Taking) Take 300 mg by mouth daily. Lab Results Component Value Date/Time [Negative] : Heme/Lymph Sodium 135 07/14/2018 06:16 PM  
 Potassium 3.7 07/14/2018 06:16 PM  
 Cholesterol, total 74 04/16/2018 04:21 AM  
 HDL Cholesterol 25 04/16/2018 04:21 AM  
 LDL, calculated 31.6 04/16/2018 04:21 AM  
 Triglyceride 87 04/16/2018 04:21 AM  
 INR 1.1 05/19/2018 03:10 AM  
 Prothrombin time 11.4 05/19/2018 03:10 AM  
 
 
 
9. Metastatic breast cancer ([facility] [address] Assessment & Plan: This condition is managed by Specialist. 
Key Oncology Meds   
    
  
 palbociclib Wynema Ahr) 125 mg cap (Taking) Take  by mouth daily. Indications: 21 days on medication, 7 days off medication  
 anastrozole (ARIMIDEX) 1 mg tablet (Taking) Take 1 Tab by mouth daily. Key Pain Meds   
    
  
 acetaminophen (TYLENOL) 325 mg tablet (Taking) Take 2 Tabs by mouth every four (4) hours as needed. Lab Results Component Value Date/Time WBC 3.3 07/14/2018 06:16 PM  
 ABS. NEUTROPHILS 1.2 07/14/2018 06:16 PM  
 HGB 10.8 07/14/2018 06:16 PM  
 HCT 34.6 07/14/2018 06:16 PM  
 PLATELET 245 97/57/7120 06:16 PM  
 Creatinine 1.43 07/14/2018 06:16 PM  
 BUN 43 07/14/2018 06:16 PM  
 ALT (SGPT) 21 07/14/2018 06:16 PM  
 AST (SGOT) 21 07/14/2018 06:16 PM  
 Albumin 2.8 07/14/2018 06:16 PM  
 
 
 
10. Type 2 diabetes with nephropathy (HCC) Assessment & Plan: This condition is managed by Specialist. 
Key Antihyperglycemic Medications   
    
  
 insulin aspart U-100 (NOVOLOG U-100 INSULIN ASPART) 100 unit/mL injection (Taking) by SubCUTAneous route. Sliding scale   Indications: usually needs 2 to 3 units at dinner  
 insulin degludec (TRESIBA FLEXTOUCH U-100) 100 unit/mL (3 mL) inpn (Taking) 14 Units by SubCUTAneous route daily. Other Key Diabetic Medications OMEGA-3 FATTY ACIDS/FISH OIL (OMEGA 3 FISH OIL PO) (Taking) Take 300 mg by mouth daily. Lab Results Component Value Date/Time  Hemoglobin A1c 8.0 07/02/2018 09:40 AM  
 Glucose 213 07/14/2018 06:16 PM  
 Creatinine 1.43 07/14/2018 06:16 PM  
 Cholesterol, total 74 04/16/2018 04:21 AM  
 HDL Cholesterol 25 04/16/2018 04:21 AM  
 LDL, calculated 31.6 04/16/2018 04:21 AM  
 Triglyceride 87 04/16/2018 04:21 AM  
 
Diabetic Foot and Eye Exam HM Status Topic Date Due  
 Diabetic Foot Care  02/21/1945 [facility] Exam  02/21/1945

## 2019-11-15 NOTE — PROGRESS NOTES
HISTORY OF PRESENT ILLNESS  Mgaen Lorenzo is a 82 y.o. female     SUMMARY:   Problem List  Date Reviewed: 11/13/2019          Codes Class Noted    Dizziness ICD-10-CM: M23  ICD-9-CM: 780.4  8/25/2019        Type 2 diabetes with nephropathy ([facility] ICD-10-CM: E11.21  ICD-9-CM: 250.40, 583.81  8/20/2018        CAD (coronary artery disease) ICD-10-CM: I25.10  ICD-9-CM: 414.00  6/21/2018        Metastatic breast cancer ([facility] ICD-10-CM: C50.919  ICD-9-CM: 174.9  6/1/2018        Acute on chronic systolic HF (heart failure) ([facility]) ICD-10-CM: H49.88  ICD-9-CM: 428.23  5/19/2018        CKD (chronic kidney disease) stage 3, GFR 30-59 ml/min ([facility]) ICD-10-CM: N18.3  ICD-9-CM: 585.3  10/25/2017        Vitamin D deficiency ICD-10-CM: E55.9  ICD-9-CM: 268.9  6/16/2017        Asymptomatic hyperuricemia ICD-10-CM: E79.0  ICD-9-CM: 790.6  2/16/2017        Statin intolerance ICD-10-CM: Z78.9  ICD-9-CM: 995.27  12/21/2016        Long-term use of immunosuppressant medication ICD-10-CM: Z79.899  ICD-9-CM: V58.69  11/21/2016        Seropositive rheumatoid arthritis of multiple sites [facility]) ICD-10-CM: M05.79  ICD-9-CM: 714.0  9/28/2016        Primary osteoarthritis of both knees ICD-10-CM: M17.0  ICD-9-CM: 715.16  9/28/2016        Weakness due to cerebrovascular accident ICD-10-CM: [id number]  ICD-9-CM: Lonia Victor Hugo  4/2/2012        PAD (peripheral artery disease) ([facility] ICD-10-CM: I73.9  ICD-9-CM: 443.9  9/7/2011        Carotid bruit ICD-10-CM: R09.89  ICD-9-CM: 785.9  8/31/2011        Hyperlipidemia ICD-10-CM: E78.5  ICD-9-CM: 272.4  1/27/2010        Type 2 diabetes mellitus with neurologic complication, with long-term current use of insulin (HCC) ICD-10-CM: E11.49, Z79.4  ICD-9-CM: 250.60, V58.67  9/2/2009        Colon cancer ([facility] ICD-10-CM: C18.9  ICD-9-CM: 153.9  9/2/2009        Age-related osteoporosis without current pathological fracture ICD-10-CM: M81.0  ICD-9-CM: 733.01  9/2/2009        HTN, goal below 140/90 ICD-10-CM: I10  ICD-9-CM: 401.9  9/2/2009        Anemia ICD-10-CM: D64.9  ICD-9-CM: 285.9  9/2/2009              Current Outpatient Medications on File Prior to Visit   Medication Sig    carvedilol (COREG) 12.5 mg tablet TAKE ONE TABLET BY MOUTH TWICE A DAY    rituximab (RITUXAN IV) 1,000 mg by IntraVENous route every 6 months.  furosemide (LASIX) 40 mg tablet Take 1 Tab by mouth daily. Takes 40mg alternating with 80mg every other day or as advised by physician    clopidogrel (PLAVIX) 75 mg tab TAKE ONE TABLET BY MOUTH DAILY    insulin aspart U-100 (NOVOLOG U-100 INSULIN ASPART) 100 unit/mL injection by SubCUTAneous route. Sliding scale   Indications: usually needs 2 to 3 units at dinner    insulin degludec (TRESIBA FLEXTOUCH U-100) 100 unit/mL (3 mL) inpn 14 Units by SubCUTAneous route daily.  denosumab (XGEVA SC) by SubCUTAneous route every thirty (30) days.  epoetin celio (PROCRIT) 10,000 unit/mL injection by SubCUTAneous route once. Unsure of dosage    REPATHA SURECLICK pen injection INJECT 1 ML SUBCUTANEOUS EVERY 14 DAYS    acetaminophen (TYLENOL) 325 mg tablet Take 2 Tabs by mouth every four (4) hours as needed. (Patient taking differently: Take 1,000 mg by mouth every four (4) hours as needed.)    b-complex with vitamin c tablet Take 1 Tab by mouth daily.  nitroglycerin (NITROSTAT) 0.4 mg SL tablet 1 Tab by SubLINGual route as needed for Chest Pain. Up to 3 doses.  anastrozole (ARIMIDEX) 1 mg tablet Take 1 Tab by mouth daily.  polyethylene glycol (MIRALAX) 17 gram packet Take 1 Packet by mouth daily.  levothyroxine (SYNTHROID) 88 mcg tablet Take 88 mcg by mouth Daily (before breakfast).  aspirin delayed-release 81 mg tablet Take 81 mg by mouth daily.  OMEGA-3 FATTY ACIDS/FISH OIL (OMEGA 3 FISH OIL PO) Take 300 mg by mouth daily.  CHOLECALCIFEROL, VITAMIN D3, (VITAMIN D-3 PO) Take 1,000 Units by mouth daily.  palbociclib (IBRANCE) 125 mg cap Take  by mouth daily.  Indications: 21 days on medication, 7 days off medication     No current facility-administered medications on file prior to visit. CARDIOLOGY STUDIES TO DATE:   echo lvef 30%, trivial pericardial effusion    Chief Complaint   Patient presents with    Follow-up    Hypertension     HPI :  Ms. Chel Oliver has developed some new liver mets from her breast cancer, so she has been on some different chemotherapy and apparently has a CT scan coming up in the near future. Her weight has generally been stable and she is not having any worrisome symptoms or problems with her medications.         CARDIAC ROS:   negative for chest pain, dyspnea, palpitations, syncope, orthopnea, paroxysmal nocturnal dyspnea, exertional chest pressure/discomfort, claudication, lower extremity edema    Family History   Problem Relation Age of Onset    Diabetes Mother     Stroke Mother     Diabetes Sister     Other Sister         fell and hit her head -  of this    [facility] Diabetes Brother     Cancer Father         stomach    Diabetes Sister        Past Medical History:   Diagnosis Date    Acute on chronic systolic HF (heart failure) ([facility] 2018    Age-related osteoporosis without current pathological fracture 2009    Anemia 2009    Asymptomatic hyperuricemia 2017    Breast cancer ([address]     CAD (coronary artery disease) 2018    Carotid bruit 2011    CHF (congestive heart failure) (HCC)     CKD (chronic kidney disease) stage 3, GFR 30-59 ml/min ([facility]) 10/25/2017    Colon cancer ([address] 2009    surgery/chemo    Colon cancer ([facility] 2009    DM (diabetes mellitus) ([address] 2009    GERD (gastroesophageal reflux disease)     H/O: CVA 2009    slight l sided weakness    Heart attack ([address]     HTN, goal below 140/90 2009    Hypothyroid 2009    Ill-defined condition     seasonal allergies    Long-term use of immunosuppressant medication 2016    Metastatic breast cancer ([address] 2018    Microalbuminuria 9/2/2009    Osteoporosis 9/2/2009    Other and unspecified hyperlipidemia 1/27/2010    PAD (peripheral artery disease) ([facility] 9/7/2011    Primary osteoarthritis of both knees 9/28/2016    Rheumatoid arthritis involving ankle ([facility] 9/28/2016    Rheumatoid arthritis(714.0) 9/2/2009    Seropositive rheumatoid arthritis of multiple sites ([facility] 9/28/2016    Statin intolerance 12/21/2016    Type 2 diabetes mellitus with neurologic complication, with long-term current use of insulin ([facility] 9/2/2009    Type 2 diabetes with nephropathy ([facility] 8/20/2018    Unspecified essential hypertension 9/2/2009    Vitamin D deficiency 6/16/2017    Weakness due to cerebrovascular accident        GENERAL ROS:  A comprehensive review of systems was negative except for that written in the HPI.     Visit Vitals  /62 (BP 1 Location: Left arm, BP Patient Position: Sitting)   Pulse 86   Resp 12   Ht 5' 5\" (1.651 m)   Wt 132 lb (59.9 kg)   SpO2 98%   BMI 21.97 kg/m²       Wt Readings from Last 3 Encounters:   11/15/19 132 lb (59.9 kg)   08/25/19 127 lb 3.3 oz (57.7 kg)   08/25/19 128 lb (58.1 kg)            BP Readings from Last 3 Encounters:   11/15/19 114/62   08/25/19 153/58   08/22/19 132/60       PHYSICAL EXAM  General appearance: alert, cooperative, no distress, appears stated age  Neurologic: Alert and oriented X 3  Neck: supple, symmetrical, trachea midline, no adenopathy, no carotid bruit and no JVD  Lungs: clear to auscultation bilaterally  Heart: regular rate and rhythm, S1, S2 normal, no murmur, click, rub or gallop  Extremities: extremities normal, atraumatic, no cyanosis or edema    Lab Results   Component Value Date/Time    Cholesterol, total 74 04/16/2018 04:21 AM    Cholesterol, total 206 (H) 02/07/2017 09:07 AM    Cholesterol, total 135 11/23/2012 09:09 AM    Cholesterol, total 101 11/30/2011 08:30 AM    Cholesterol, total 147 07/25/2011 08:44 AM    HDL Cholesterol 25 04/16/2018 04:21 AM    HDL Cholesterol 36 (L) 02/07/2017 09:07 AM    HDL Cholesterol 42 11/23/2012 09:09 AM    HDL Cholesterol 31 (L) 11/30/2011 08:30 AM    HDL Cholesterol 45 07/25/2011 08:44 AM    LDL, calculated 31.6 04/16/2018 04:21 AM    LDL, calculated 128 (H) 02/07/2017 09:07 AM    LDL, calculated 74 11/23/2012 09:09 AM    LDL, calculated 55 11/30/2011 08:30 AM    LDL, calculated 83 07/25/2011 08:44 AM    Triglyceride 87 04/16/2018 04:21 AM    Triglyceride 209 (H) 02/07/2017 09:07 AM    Triglyceride 95 11/23/2012 09:09 AM    Triglyceride 74 11/30/2011 08:30 AM    Triglyceride 93 07/25/2011 08:44 AM    CHOL/HDL Ratio 3.0 04/16/2018 04:21 AM    CHOL/HDL Ratio 3.2 06/17/2010 09:33 AM    CHOL/HDL Ratio 2.8 02/12/2009 08:19 AM     ASSESSMENT  Ms. Amy Olmos is stable and asymptomatic from a cardiac standpoint, well compensated on a good medical regimen and needs no cardiac testing at this time. current treatment plan is effective, no change in therapy  lab results and schedule of future lab studies reviewed with patient  reviewed diet, exercise and weight control    Encounter Diagnoses   Name Primary?  Coronary artery disease involving native coronary artery of native heart without angina pectoris Yes    CKD (chronic kidney disease) stage 3, GFR 30-59 ml/min (HCC)     HTN, goal below 140/90     Mixed hyperlipidemia     Ischemic cardiomyopathy      No orders of the defined types were placed in this encounter. Follow-up and Dispositions    · Return in about 6 months (around 5/15/2020).          Noman Monge MD  11/15/2019

## 2019-12-20 NOTE — TELEPHONE ENCOUNTER
Pts son is calling stating that pt has runny nose since a week and they brought her Allegra, the packaging says to contact the physician if the pt is over 65 and have kidney issues,  Requesting call back with clarification, if pt can take Allegra.   BCB# [phone]  LOV:  August 08, 2019 03:00 PM

## 2019-12-23 NOTE — TELEPHONE ENCOUNTER
Outbound call to patients son. Patients son made aware that patient can take Allegra once a day. Patients son verbalized understanding.

## 2019-12-25 PROBLEM — R65.21 SEPTIC SHOCK (HCC): Status: ACTIVE | Noted: 2019-01-01

## 2019-12-25 PROBLEM — A41.9 SEPTIC SHOCK (HCC): Status: ACTIVE | Noted: 2019-01-01

## 2019-12-25 NOTE — ED PROVIDER NOTES
Date of Service: 
12/25/2019 Patient: 
Romina  Chief Complaint: 
Fever; Nausea; and Chemotherapy HPI: 
Romina  is a 82 y.o.  female who presents for evaluation of fever. Patient is a metastatic cancer patient on active chemotherapy. She states that yesterday she did not feel well and this morning was noted to have a fever of 102 °F.  She otherwise notes that she just generally feels weak and tired. No chest pain or shortness of breath. No abdominal pain. She believes she might have a urinary tract infection as she has had these in the past.  Otherwise patient denies any other acute complaints or modifying factors. Past Medical History:  
Diagnosis Date  Acute on chronic systolic HF (heart failure) ([address] 5/19/2018  Age-related osteoporosis without current pathological fracture 9/2/2009  Anemia 9/2/2009  Asymptomatic hyperuricemia 2/16/2017  Breast cancer ([address]  CAD (coronary artery disease) 6/21/2018  Carotid bruit 8/31/2011  CHF (congestive heart failure) ([address]  CKD (chronic kidney disease) stage 3, GFR 30-59 ml/min ([facility]) 10/25/2017  Colon cancer ([address] 9/2/2009  
 surgery/chemo  Colon cancer ([address] 9/2/2009  DM (diabetes mellitus) ([address] 9/2/2009  GERD (gastroesophageal reflux disease)  H/O: CVA 9/2/2009  
 slight l sided weakness  Heart attack ([address]  HTN, goal below 140/90 9/2/2009  Hypothyroid 9/2/2009  Ill-defined condition   
 seasonal allergies  Long-term use of immunosuppressant medication 11/21/2016  Metastatic breast cancer ([address] 6/1/2018  Microalbuminuria 9/2/2009  Osteoporosis 9/2/2009  Other and unspecified hyperlipidemia 1/27/2010  PAD (peripheral artery disease) ([address] 9/7/2011  Primary osteoarthritis of both knees 9/28/2016  Rheumatoid arthritis involving ankle ([address] 9/28/2016  Rheumatoid arthritis(714.0) 9/2/2009  Seropositive rheumatoid arthritis of multiple sites ([address] 9/28/2016  Statin intolerance 2016  Type 2 diabetes mellitus with neurologic complication, with long-term current use of insulin ([facility] 2009  Type 2 diabetes with nephropathy ([facility] 2018  Unspecified essential hypertension 2009  Vitamin D deficiency 2017  Weakness due to cerebrovascular accident Past Surgical History:  
Procedure Laterality Date  HX COLECTOMY  HX HYSTERECTOMY  HX OTHER SURGICAL    
 colonoscopies numerous since  Family History:  
Problem Relation Age of Onset  Diabetes Mother  Stroke Mother  Diabetes Sister  Other Sister   
     fell and hit her head -  of this  Diabetes Brother  Cancer Father   
     stomach  Diabetes Sister Social History Socioeconomic History  Marital status:  Spouse name: Not on file  Number of children: Not on file  Years of education: Not on file  Highest education level: Not on file Occupational History  Not on file Social Needs  Financial resource strain: Not on file  Food insecurity:  
  Worry: Not on file Inability: Not on file  Transportation needs:  
  Medical: Not on file Non-medical: Not on file Tobacco Use  Smoking status: Never Smoker  Smokeless tobacco: Never Used Substance and Sexual Activity  Alcohol use: No  
 Drug use: No  
 Sexual activity: Not Currently Partners: Male Lifestyle  Physical activity:  
  Days per week: Not on file Minutes per session: Not on file  Stress: Not on file Relationships  Social connections:  
  Talks on phone: Not on file Gets together: Not on file Attends Hoahaoism service: Not on file Active member of club or organization: Not on file Attends meetings of clubs or organizations: Not on file Relationship status: Not on file  Intimate partner violence:  
  Fear of current or ex partner: Not on file Emotionally abused: Not on file Physically abused: Not on file Forced sexual activity: Not on file Other Topics Concern  Not on file Social History Narrative  Not on file ALLERGIES: Statins-hmg-coa reductase inhibitors and Sulfa (sulfonamide antibiotics) Review of Systems Constitutional: Positive for fatigue and fever. HENT: Negative for hearing loss. Eyes: Negative for visual disturbance. Respiratory: Negative for shortness of breath. Cardiovascular: Negative for chest pain. Gastrointestinal: Negative for abdominal pain. Genitourinary: Positive for dysuria. Negative for flank pain. Musculoskeletal: Negative for back pain. Skin: Negative for rash. Neurological: Negative for dizziness, light-headedness and headaches. Vitals:  
 12/25/19 1345 12/25/19 1400 12/25/19 1415 12/25/19 1430 BP: (!) 83/37 (!) 86/41 93/44 (!) 88/43 Pulse: 96 100 98 97 Resp: 24 23 18 26 Temp:      
SpO2: 98% 98% 98% Weight:      
      
 
Physical Exam 
Vitals signs and nursing note reviewed. Constitutional:   
   General: She is not in acute distress. Appearance: She is well-developed. She is not ill-appearing or toxic-appearing. HENT:  
   Head: Normocephalic and atraumatic. Right Ear: Tympanic membrane normal.  
   Left Ear: Tympanic membrane normal.  
   Nose: Nose normal. No congestion. Eyes:  
   General: No scleral icterus. Conjunctiva/sclera: Conjunctivae normal.  
   Pupils: Pupils are equal, round, and reactive to light. Neck: Musculoskeletal: Normal range of motion and neck supple. No neck rigidity. Vascular: No JVD. Trachea: No tracheal deviation. Cardiovascular:  
   Rate and Rhythm: Regular rhythm. Tachycardia present. Heart sounds: No murmur. Pulmonary:  
   Effort: Pulmonary effort is normal. No respiratory distress. Breath sounds: Normal breath sounds. Abdominal: General: Bowel sounds are normal. There is no distension. Palpations: Abdomen is soft. Tenderness: There is no tenderness. Musculoskeletal: Normal range of motion. General: No tenderness or deformity. Skin: 
   General: Skin is warm and dry. Capillary Refill: Capillary refill takes less than 2 seconds. Coloration: Skin is not jaundiced. Findings: No rash. Neurological:  
   General: No focal deficit present. Mental Status: She is alert and oriented to person, place, and time. Motor: No weakness. Psychiatric:     
   Mood and Affect: Mood normal.     
   Behavior: Behavior normal.     
   Judgment: Judgment normal.  
 
  
 
MDM Number of Diagnoses or Management Options Acute cystitis without hematuria:  
Acute on chronic renal insufficiency:  
Sepsis, due to unspecified organism, unspecified whether acute organ dysfunction present [facility]): Amount and/or Complexity of Data Reviewed Decide to obtain previous medical records or to obtain history from someone other than the patient: yes Critical Care Total time providing critical care: > 105 minutes ED Course as of Dec 25 1534 Wed Dec 25, 2019  
1350 WBC(!): 33.0 [GG] 1412 Nitrites(!): POSITIVE [GG] [id number] 93/64 [GG] ED Course User Index [GG] [facility]   
 
VITAL SIGNS: 
Patient Vitals for the past 4 hrs: 
 Temp Pulse Resp BP SpO2  
12/25/19 1430  97 26 (!) 88/43   
12/25/19 1415  98 18 93/44 98 % 12/25/19 1400  100 23 (!) 86/41 98 % 12/25/19 1345  96 24 (!) 83/37 98 % 12/25/19 1330  99 19 93/43 97 % 12/25/19 1315  (!) 101 30 99/44 95 % 12/25/19 1310 (!) (P) 102 °F (38.9 °C) (!) 101 20 104/43 96 % 12/25/19 1214 (!) 102.8 °F (39.3 °C) (!) 124 20 138/60 95 % LABS: 
Recent Results (from the past 6 hour(s)) POC LACTIC ACID Collection Time: 12/25/19  1:04 PM  
Result Value Ref Range  Lactic Acid (POC) 1.22 0.40 - 2.00 mmol/L  
CBC WITH AUTOMATED DIFF  
 Collection Time: 12/25/19  1:07 PM  
Result Value Ref Range WBC 33.0 (H) 3.6 - 11.0 K/uL  
 RBC 2.89 (L) 3.80 - 5.20 M/uL HGB 9.3 (L) 11.5 - 16.0 g/dL HCT 29.0 (L) 35.0 - 47.0 % .3 (H) 80.0 - 99.0 FL  
 MCH 32.2 26.0 - 34.0 PG  
 MCHC 32.1 30.0 - 36.5 g/dL  
 RDW 18.6 (H) 11.5 - 14.5 % PLATELET 453 (L) 163 - 400 K/uL MPV 10.9 8.9 - 12.9 FL  
 NRBC 0.2 (H) 0  WBC ABSOLUTE NRBC 0.08 (H) 0.00 - 0.01 K/uL NEUTROPHILS 91 (H) 32 - 75 % LYMPHOCYTES 1 (L) 12 - 49 % MONOCYTES 7 5 - 13 % EOSINOPHILS 0 0 - 7 % BASOPHILS 0 0 - 1 % MYELOCYTES 1 (H) 0 % IMMATURE GRANULOCYTES 0 %  
 ABS. NEUTROPHILS 30.0 (H) 1.8 - 8.0 K/UL  
 ABS. LYMPHOCYTES 0.3 (L) 0.8 - 3.5 K/UL  
 ABS. MONOCYTES 2.3 (H) 0.0 - 1.0 K/UL  
 ABS. EOSINOPHILS 0.0 0.0 - 0.4 K/UL  
 ABS. BASOPHILS 0.0 0.0 - 0.1 K/UL  
 ABS. IMM. GRANS. 0.0 K/UL  
 DF MANUAL    
 RBC COMMENTS ANISOCYTOSIS 1+ 
    
 RBC COMMENTS MACROCYTOSIS 1+ 
    
 RBC COMMENTS POLYCHROMASIA PRESENT 
    
METABOLIC PANEL, COMPREHENSIVE Collection Time: 12/25/19  1:07 PM  
Result Value Ref Range Sodium 134 (L) 136 - 145 mmol/L Potassium 3.4 (L) 3.5 - 5.1 mmol/L Chloride 101 97 - 108 mmol/L  
 CO2 25 21 - 32 mmol/L Anion gap 8 5 - 15 mmol/L Glucose 231 (H) 65 - 100 mg/dL BUN 35 (H) 6 - 20 MG/DL Creatinine 1.48 (H) 0.55 - 1.02 MG/DL  
 BUN/Creatinine ratio 24 (H) 12 - 20 GFR est AA 41 (L) >60 ml/min/1.73m2 GFR est non-AA 34 (L) >60 ml/min/1.73m2 Calcium 8.3 (L) 8.5 - 10.1 MG/DL Bilirubin, total 1.1 (H) 0.2 - 1.0 MG/DL  
 ALT (SGPT) 67 12 - 78 U/L  
 AST (SGOT) 83 (H) 15 - 37 U/L Alk. phosphatase 290 (H) 45 - 117 U/L Protein, total 6.8 6.4 - 8.2 g/dL Albumin 3.1 (L) 3.5 - 5.0 g/dL Globulin 3.7 2.0 - 4.0 g/dL A-G Ratio 0.8 (L) 1.1 - 2.2 SAMPLES BEING HELD Collection Time: 12/25/19  1:07 PM  
Result Value Ref Range SAMPLES BEING HELD 1RED 1BLU   
 COMMENT Add-on orders for these samples will be processed based on acceptable specimen integrity and analyte stability, which may vary by analyte. INFLUENZA A & B AG (RAPID TEST) Collection Time: 12/25/19  1:07 PM  
Result Value Ref Range Influenza A Antigen NEGATIVE  NEG Influenza B Antigen NEGATIVE  NEG    
EKG, 12 LEAD, INITIAL Collection Time: 12/25/19  1:08 PM  
Result Value Ref Range Ventricular Rate 102 BPM  
 Atrial Rate 102 BPM  
 P-R Interval 150 ms QRS Duration 80 ms  
 Q-T Interval 344 ms QTC Calculation (Bezet) 448 ms Calculated P Axis 64 degrees Calculated R Axis 14 degrees Calculated T Axis 109 degrees Diagnosis Sinus tachycardia ST & T wave abnormality, consider lateral ischemia When compared with ECG of 25-AUG-2019 00:57, No significant change was found URINALYSIS W/ REFLEX CULTURE Collection Time: 12/25/19  1:38 PM  
Result Value Ref Range Color YELLOW/STRAW Appearance CLOUDY (A) CLEAR Specific gravity 1.013 1.003 - 1.030    
 pH (UA) 5.5 5.0 - 8.0 Protein 100 (A) NEG mg/dL Glucose NEGATIVE  NEG mg/dL Ketone NEGATIVE  NEG mg/dL Bilirubin NEGATIVE  NEG Blood SMALL (A) NEG Urobilinogen 0.2 0.2 - 1.0 EU/dL Nitrites POSITIVE (A) NEG Leukocyte Esterase MODERATE (A) NEG    
 UA:UC IF INDICATED URINE CULTURE ORDERED (A) CNI    
 WBC >100 (H) 0 - 4 /hpf  
 RBC 0-5 0 - 5 /hpf Epithelial cells FEW FEW /lpf Bacteria 4+ (A) NEG /hpf Hyaline cast 2-5 0 - 5 /lpf URINE CULTURE HOLD SAMPLE Collection Time: 12/25/19  1:38 PM  
Result Value Ref Range Urine culture hold URINE ON HOLD IN MICROBIOLOGY DEPT FOR 3 DAYS. IF UNPRESERVED URINE IS SUBMITTED, IT CANNOT BE USED FOR ADDITIONAL TESTING AFTER 24 HRS, RECOLLECTION WILL BE REQUIRED. EKG, 12 LEAD, INITIAL Collection Time: 12/25/19  2:48 PM  
Result Value Ref Range  Ventricular Rate 97 BPM  
 Atrial Rate 97 BPM  
 P-R Interval 166 ms  
 QRS Duration 84 ms Q-T Interval 354 ms QTC Calculation (Bezet) 449 ms Calculated P Axis 81 degrees Calculated R Axis 32 degrees Calculated T Axis 176 degrees Diagnosis Sinus rhythm with premature supraventricular complexes Anteroseptal infarct , age undetermined ST & T wave abnormality, consider inferolateral ischemia When compared with ECG of 25-DEC-2019 13:08, MANUAL COMPARISON REQUIRED, DATA IS UNCONFIRMED IMAGING: 
XR CHEST PORT Final Result IMPRESSION: No acute findings. Medications During Visit: 
Medications  
sodium chloride (NS) flush 5-10 mL (has no administration in time range) levoFLOXacin (LEVAQUIN) 750 mg in D5W IVPB (750 mg IntraVENous New Bag 12/25/19 1434) vancomycin (VANCOCIN) 1250 mg in  ml infusion (1,250 mg IntraVENous New Bag 12/25/19 1452) NOREPINephrine (LEVOPHED) 8 mg in 5% dextrose 250mL (32 mcg/mL) infusion (has no administration in time range)  
ondansetron (ZOFRAN) injection 4 mg (4 mg IntraVENous Given 12/25/19 1315)  
sodium chloride 0.9 % bolus infusion 1,000 mL (0 mL IntraVENous IV Completed 12/25/19 1439) cefepime (MAXIPIME) 2 g in 0.9% sodium chloride (MBP/ADV) 100 mL (0 g IntraVENous IV Completed 12/25/19 1439)  
sodium chloride 0.9 % bolus infusion 768 mL (768 mL IntraVENous New Bag 12/25/19 1358) ibuprofen (MOTRIN) tablet 600 mg (600 mg Oral Given 12/25/19 1403) DECISION MAKING: 
Nito Trejo is a 82 y.o. female who comes in as above. Upon arrival, patient is found to be septic. Her white count is 33,000 in the setting of a chemotherapy patient with a fever and tachycardia. Patient is provided with Motrin as she took Tylenol before she arrived. She is also provided with 30 cc/kg of fluid. I also started her on the unknown source treatment has there is no urine had returned by the time I started antibiotics. Later, I discovered that she is most likely uroseptic.   At this time, patient's blood pressure has been somewhat low so I did start a low-dose norepinephrine drip through her port. At this time patient will be admitted to the intensive care unit given her need for pressors IMPRESSION: 
1. Sepsis, due to unspecified organism, unspecified whether acute organ dysfunction present ([facility] 2. Acute cystitis without hematuria 3. Acute on chronic renal insufficiency DISPOSITION: 
Admitted Total critical care time spent exclusive of procedures:  122 mins. Procedures IMPRESSION: 
1. Sepsis, due to unspecified organism, unspecified whether acute organ dysfunction present ([facility] 2. Acute cystitis without hematuria 3. Acute on chronic renal insufficiency - Sepsis order set entered: YES 
- Broad Spectrum Antibiotics given:  Levofloxacin, Vanc - Repeat lactic acid ordered for time NA 
- Re-assessment performed at time 1600 and clinical condition improving.   
- Actions taken: IVF, ABX, Pressors. If Septic Shock (SBP<90, MAP<65, Lactate >4): - IVF:  30cc/kg actual BW - Vasopressors: norepinephrine Plan: 
Admit to: ICU Total critical care time spent exclusive of procedures:  122 minutes Stacey Julian DO

## 2019-12-25 NOTE — PROGRESS NOTES
Admission Medication Reconciliation: 
 
Information obtained from:  Patient's son/RxQuery RxQuery data available¹:  YES Comments/Recommendations: Updated PTA meds/reviewed patient's allergies. 1)  Spoke with patient's son who is directly involved with care and medication administration for the patient. Patient has metastatic breast cancer for which she receives chemotherapy and is followed by [facility]. Unable to verify chemotherapy regimen/doses with [facility] - closed for [facility]. 2)  Medication changes (since last review): Added - Capecitabine (Xeloda) - Paclitaxel-protein bound (Abraxane) Removed - Anastrozole - Palbociclib Magda Graft) 3)  Drug allergies up to date. 4) Patient's son states repatha is due tomorrow - can provide from home. ¹RxQuery pharmacy benefit data reflects medications filled and processed through the patient's insurance, however  
this data does NOT capture whether the medication was picked up or is currently being taken by the patient. Allergies:  Statins-hmg-coa reductase inhibitors and Sulfa (sulfonamide antibiotics) Significant PMH/Disease States:  
Past Medical History:  
Diagnosis Date Acute on chronic systolic HF (heart failure) ([address] 5/19/2018 Age-related osteoporosis without current pathological fracture 9/2/2009 Anemia 9/2/2009 Asymptomatic hyperuricemia 2/16/2017 Breast cancer ([address] CAD (coronary artery disease) 6/21/2018 Carotid bruit 8/31/2011 CHF (congestive heart failure) ([address] CKD (chronic kidney disease) stage 3, GFR 30-59 ml/min ([facility]) 10/25/2017 Colon cancer ([address] 9/2/2009  
 surgery/chemo Colon cancer ([address] 9/2/2009 DM (diabetes mellitus) ([address] 9/2/2009 GERD (gastroesophageal reflux disease) H/O: CVA 9/2/2009  
 slight l sided weakness Heart attack ([address] HTN, goal below 140/90 9/2/2009 Hypothyroid 9/2/2009 Ill-defined condition   
 seasonal allergies Long-term use of immunosuppressant medication 11/21/2016 Metastatic breast cancer ([facility] 6/1/2018 Microalbuminuria 9/2/2009 Osteoporosis 9/2/2009 Other and unspecified hyperlipidemia 1/27/2010 PAD (peripheral artery disease) ([facility] 9/7/2011 Primary osteoarthritis of both knees 9/28/2016 Rheumatoid arthritis [facility] ankle ([facility] 9/28/2016 Rheumatoid arthritis(714.0) 9/2/2009 Seropositive rheumatoid arthritis of multiple sites ([facility] 9/28/2016 Statin intolerance 12/21/2016 Type 2 diabetes mellitus with neurologic complication, with long-term current use of insulin ([facility] 9/2/2009 Type 2 diabetes with nephropathy ([facility] 8/20/2018 Unspecified essential hypertension 9/2/2009 Vitamin D deficiency 6/16/2017 Weakness due to cerebrovascular accident Chief Complaint for this Admission: Chief Complaint Patient presents with Fever Nausea Chemotherapy Prior to Admission Medications:  
Prior to Admission Medications Prescriptions Last Dose Informant Taking? CHOLECALCIFEROL, VITAMIN D3, (VITAMIN D-3 PO) 12/25/2019 at Unknown time  Yes Sig: Take 1,000 Units by mouth daily. OMEGA-3 FATTY ACIDS/FISH OIL (OMEGA 3 FISH OIL PO) 12/25/2019 at Unknown time  Yes Sig: Take 300 mg by mouth daily. PACLitaxel-Protein Bound (ABRAXANE) 100 mg chemo injection 11/25/2019 at Unknown time  Yes Sig: by IntraVENous route. REPATHA SURECLICK pen injection 23/03/8383 at Unknown time  Yes Sig: INJECT 1 ML SUBCUTANEOUS EVERY 14 DAYS  
acetaminophen (TYLENOL) 325 mg tablet 12/18/2019 at Unknown time  Yes Sig: Take 2 Tabs by mouth every four (4) hours as needed. Patient taking differently: Take 1,000 mg by mouth every four (4) hours as needed. aspirin delayed-release 81 mg tablet 12/25/2019 at Unknown time  Yes Sig: Take 81 mg by mouth daily. b-complex with vitamin c tablet 12/25/2019 at Unknown time  Yes Sig: Take 1 Tab by mouth daily. capecitabine (XELODA) 500 mg tablet 2019 at Unknown time  Yes Sig:  
  
carvedilol (COREG) 12.5 mg tablet 2019 at Unknown time  Yes Sig: TAKE ONE TABLET BY MOUTH TWICE A DAY  
clopidogrel (PLAVIX) 75 mg tab 2019 at Unknown time  Yes Sig: TAKE ONE TABLET BY MOUTH DAILY  
denosumab (XGEVA SC) 2019 at Unknown time  Yes Sig: by SubCUTAneous route every thirty (30) days. epoetin celio (PROCRIT) 10,000 unit/mL injection 2019 at Unknown time  Yes Sig: by SubCUTAneous route once. Unsure of dosage  
furosemide (LASIX) 40 mg tablet 2019 at Unknown time  Yes Sig: TAKE ONE TABLET BY MOUTH EVERY OTHER DAY ALTERNATING WITH TAKE TWO TABLETS BY MOUTH EVERY OTHER DAY OR AS ADVISED BY PHYSICIAN  
insulin aspart U-100 (NOVOLOG U-100 INSULIN ASPART) 100 unit/mL injection 2019 at Unknown time  Yes Sig: by SubCUTAneous route. Sliding scale   Indications: usually needs 2 to 3 units at dinner  
insulin degludec (TRESIBA FLEXTOUCH U-100) 100 unit/mL (3 mL) inpn 2019 at Unknown time  Yes Si Units by SubCUTAneous route daily. levothyroxine (SYNTHROID) 88 mcg tablet 2019 at Unknown time  Yes Sig: Take 88 mcg by mouth Daily (before breakfast). nitroglycerin (NITROSTAT) 0.4 mg SL tablet Unknown at Unknown time  No  
Si Tab by SubLINGual route as needed for Chest Pain. Up to 3 doses. polyethylene glycol (MIRALAX) 17 gram packet 2019 at Unknown time  Yes Sig: Take 1 Packet by mouth daily. rituximab (RITUXAN IV) 2019 at Unknown time  Yes Si,000 mg by IntraVENous route every 6 months. Facility-Administered Medications: None Please contact the main inpatient pharmacy with any questions or concerns at [phone] and we will direct you to the clinical pharmacist covering this patient's care while in-house.   
Alissa Cadena

## 2019-12-25 NOTE — PROGRESS NOTES
TRANSFER - IN REPORT: 
 
Verbal report received from Jyoti (name) on [facility]  being received from ED (unit) for routine progression of care Report consisted of patients Situation, Background, Assessment and  
Recommendations(SBAR). Information from the following report(s) SBAR, Kardex, ED Summary, Intake/Output, MAR, Recent Results, Cardiac Rhythm NSR and Alarm Parameters  was reviewed with the receiving nurse. Opportunity for questions and clarification was provided. Assessment completed upon patients arrival to unit and care assumed. Primary Nurse Mandy Parrish RN and Jake Dietrich RN performed a dual skin assessment on this patient No impairment noted Calderon score is 15

## 2019-12-25 NOTE — H&P
History and Physical 
 
Subjective: Renzo Govea is a 82 y.o. female with a past medical history of metastatic breast cancer on active chemotherapy, HFrEF, frequent UTIs, diabetes, CVA, RA, MI s/p stents who presented to the ED with reports of fever. Patient/family report that she has been more lethargic since yesterday. She also started to experience fever and chills the morning of admission and had one episode of emesis. Per family report, patient has also had a productive cough. The patient denies dysuria, urinary frequency, back/suprapubic pain, diarrhea, shortness of breath, dizziness, palpitations, chest pain. In the ED, UA concerning for UTI. Flu swab negative, blood cultures pending. Patient unable to expectorate sputum for sample. Patient was given 2L IVF and started on vancomycin, cefepime, and levaquin. Patient remained hypotensive despite fluid resuscitation and was started on vasopressors necessitating a transfer to the ICU. Past Medical History:  
Diagnosis Date  Acute on chronic systolic HF (heart failure) ([address] 5/19/2018  Age-related osteoporosis without current pathological fracture 9/2/2009  Anemia 9/2/2009  Asymptomatic hyperuricemia 2/16/2017  Breast cancer ([address]  CAD (coronary artery disease) 6/21/2018  Carotid bruit 8/31/2011  CHF (congestive heart failure) ([address]  CKD (chronic kidney disease) stage 3, GFR 30-59 ml/min ([facility]) 10/25/2017  Colon cancer ([address] 9/2/2009  
 surgery/chemo  Colon cancer ([address] 9/2/2009  DM (diabetes mellitus) ([address] 9/2/2009  GERD (gastroesophageal reflux disease)  H/O: CVA 9/2/2009  
 slight l sided weakness  Heart attack ([address]  HTN, goal below 140/90 9/2/2009  Hypothyroid 9/2/2009  Ill-defined condition   
 seasonal allergies  Long-term use of immunosuppressant medication 11/21/2016  Metastatic breast cancer ([address] 6/1/2018  Microalbuminuria 9/2/2009  Osteoporosis 9/2/2009  Other and unspecified hyperlipidemia 2010  PAD (peripheral artery disease) ([facility] 2011  Primary osteoarthritis of both knees 2016  Rheumatoid arthritis involving ankle ([facility] 2016  Rheumatoid arthritis(714.0) 2009  Seropositive rheumatoid arthritis of multiple sites ([facility] 2016  Statin intolerance 2016  Type 2 diabetes mellitus with neurologic complication, with long-term current use of insulin ([facility] 2009  Type 2 diabetes with nephropathy ([facility] 2018  Unspecified essential hypertension 2009  Vitamin D deficiency 2017  Weakness due to cerebrovascular accident Past Surgical History:  
Procedure Laterality Date  HX COLECTOMY  HX HYSTERECTOMY  HX OTHER SURGICAL    
 colonoscopies numerous since  Family History Problem Relation Age of Onset  Diabetes Mother  Stroke Mother  Diabetes Sister  Other Sister   
     fell and hit her head -  of this  Diabetes Brother  Cancer Father   
     stomach  Diabetes Sister Social History Tobacco Use  Smoking status: Never Smoker  Smokeless tobacco: Never Used Substance Use Topics  Alcohol use: No  
   
Prior to Admission medications Medication Sig Start Date End Date Taking? Authorizing Provider  
capecitabine (XELODA) 500 mg tablet Yes Provider, Historical  
PACLitaxel-Protein Bound (ABRAXANE) 100 mg chemo injection by IntraVENous route. Yes Provider, Historical  
furosemide (LASIX) 40 mg tablet TAKE ONE TABLET BY MOUTH EVERY OTHER DAY ALTERNATING WITH TAKE TWO TABLETS BY MOUTH EVERY OTHER DAY OR AS ADVISED BY PHYSICIAN 19  Yes Faisal Ramirez MD  
carvedilol (COREG) 12.5 mg tablet TAKE ONE TABLET BY MOUTH TWICE A DAY 10/8/19  Yes Faisal Ramirez MD  
rituximab (RITUXAN IV) 1,000 mg by IntraVENous route every 6 months.    Yes Provider, Historical  
 clopidogrel (PLAVIX) 75 mg tab TAKE ONE TABLET BY MOUTH DAILY 5/6/19  Yes Keyur Cabrera MD  
insulin aspart U-100 (NOVOLOG U-100 INSULIN ASPART) 100 unit/mL injection by SubCUTAneous route. Sliding scale   Indications: usually needs 2 to 3 units at dinner   Yes Provider, Historical  
insulin degludec (TRESIBA FLEXTOUCH U-100) 100 unit/mL (3 mL) inpn 14 Units by SubCUTAneous route daily. Yes Provider, Historical  
denosumab (XGEVA SC) by SubCUTAneous route every thirty (30) days. Yes Provider, Historical  
epoetin celio (PROCRIT) 10,000 unit/mL injection by SubCUTAneous route once. Unsure of dosage   Yes Provider, Historical  
REPATHA SURECLICK pen injection INJECT 1 ML SUBCUTANEOUS EVERY 14 DAYS 1/31/19  Yes Keyur Cabrera MD  
acetaminophen (TYLENOL) 325 mg tablet Take 2 Tabs by mouth every four (4) hours as needed. Patient taking differently: Take 1,000 mg by mouth every four (4) hours as needed. 6/21/18  Yes Dipak Chen MD  
b-complex with vitamin c tablet Take 1 Tab by mouth daily. 6/22/18  Yes Dipak Chen MD  
polyethylene glycol (MIRALAX) 17 gram packet Take 1 Packet by mouth daily. 5/12/18  Yes Gertrude Hopson MD  
levothyroxine (SYNTHROID) 88 mcg tablet Take 88 mcg by mouth Daily (before breakfast). Yes Provider, Historical  
aspirin delayed-release 81 mg tablet Take 81 mg by mouth daily. Yes Provider, Historical  
OMEGA-3 FATTY ACIDS/FISH OIL (OMEGA 3 FISH OIL PO) Take 300 mg by mouth daily. Yes Provider, Historical  
CHOLECALCIFEROL, VITAMIN D3, (VITAMIN D-3 PO) Take 1,000 Units by mouth daily. Yes Provider, Historical  
nitroglycerin (NITROSTAT) 0.4 mg SL tablet 1 Tab by SubLINGual route as needed for Chest Pain. Up to 3 doses. 6/6/18   Meliton Jiang MD  
 
Allergies Allergen Reactions  Statins-Hmg-Coa Reductase Inhibitors Other (comments) Intolerant to statins  Sulfa (Sulfonamide Antibiotics) Other (comments)  
  syncope Review of Systems: A comprehensive review of systems was negative except for that written in the History of Present Illness. Objective: Intake and Output:   
12/25 0701 - 12/25 1900 In: -  
Out: 200 [Urine:200] No intake/output data recorded. Physical Exam:  
Visit Vitals /45 Pulse 86 Temp 97.9 °F (36.6 °C) Resp 21 Ht 5' 5\" (1.651 m) Comment: Historic Wt 59.7 kg (131 lb 9.8 oz) SpO2 97% BMI 21.90 kg/m² Physical Exam 
Constitutional:   
   General: She is not in acute distress. Appearance: She is ill-appearing. HENT:  
   Mouth/Throat:  
   Mouth: Mucous membranes are moist.  
Eyes:  
   General: No scleral icterus. Extraocular Movements: Extraocular movements intact. Pupils: Pupils are equal, round, and reactive to light. Neck: Musculoskeletal: Normal range of motion and neck supple. Cardiovascular:  
   Rate and Rhythm: Normal rate and regular rhythm. Pulses: Normal pulses. Heart sounds: Normal heart sounds. No murmur. No friction rub. No gallop. Pulmonary:  
   Effort: Pulmonary effort is normal. No respiratory distress. Breath sounds: Normal breath sounds. No wheezing, rhonchi or rales. Abdominal:  
   General: Abdomen is flat. There is no distension. Palpations: Abdomen is soft. Tenderness: There is no tenderness. There is no guarding. Musculoskeletal: Normal range of motion. Skin: 
   General: Skin is warm and dry. Neurological:  
   General: No focal deficit present. Mental Status: She is alert and oriented to person, place, and time. Psychiatric:     
   Mood and Affect: Mood normal.     
   Behavior: Behavior normal.  
 
 
   
   
   
   
   
   
   
   
   
   
   
   
   
   
   
   
   
   
   
 
 
ECG:  normal EKG, normal sinus rhythm Data Review:  
Recent Results (from the past 24 hour(s)) POC LACTIC ACID Collection Time: 12/25/19  1:04 PM  
Result Value Ref Range  Lactic Acid (POC) 1.22 0.40 - 2.00 mmol/L  
 CBC WITH AUTOMATED DIFF Collection Time: 12/25/19  1:07 PM  
Result Value Ref Range WBC 33.0 (H) 3.6 - 11.0 K/uL  
 RBC 2.89 (L) 3.80 - 5.20 M/uL HGB 9.3 (L) 11.5 - 16.0 g/dL HCT 29.0 (L) 35.0 - 47.0 % .3 (H) 80.0 - 99.0 FL  
 MCH 32.2 26.0 - 34.0 PG  
 MCHC 32.1 30.0 - 36.5 g/dL  
 RDW 18.6 (H) 11.5 - 14.5 % PLATELET 894 (L) 062 - 400 K/uL MPV 10.9 8.9 - 12.9 FL  
 NRBC 0.2 (H) 0  WBC ABSOLUTE NRBC 0.08 (H) 0.00 - 0.01 K/uL NEUTROPHILS 91 (H) 32 - 75 % LYMPHOCYTES 1 (L) 12 - 49 % MONOCYTES 7 5 - 13 % EOSINOPHILS 0 0 - 7 % BASOPHILS 0 0 - 1 % MYELOCYTES 1 (H) 0 % IMMATURE GRANULOCYTES 0 %  
 ABS. NEUTROPHILS 30.0 (H) 1.8 - 8.0 K/UL  
 ABS. LYMPHOCYTES 0.3 (L) 0.8 - 3.5 K/UL  
 ABS. MONOCYTES 2.3 (H) 0.0 - 1.0 K/UL  
 ABS. EOSINOPHILS 0.0 0.0 - 0.4 K/UL  
 ABS. BASOPHILS 0.0 0.0 - 0.1 K/UL  
 ABS. IMM. GRANS. 0.0 K/UL  
 DF MANUAL    
 RBC COMMENTS ANISOCYTOSIS 1+ 
    
 RBC COMMENTS MACROCYTOSIS 1+ 
    
 RBC COMMENTS POLYCHROMASIA PRESENT 
    
METABOLIC PANEL, COMPREHENSIVE Collection Time: 12/25/19  1:07 PM  
Result Value Ref Range Sodium 134 (L) 136 - 145 mmol/L Potassium 3.4 (L) 3.5 - 5.1 mmol/L Chloride 101 97 - 108 mmol/L  
 CO2 25 21 - 32 mmol/L Anion gap 8 5 - 15 mmol/L Glucose 231 (H) 65 - 100 mg/dL BUN 35 (H) 6 - 20 MG/DL Creatinine 1.48 (H) 0.55 - 1.02 MG/DL  
 BUN/Creatinine ratio 24 (H) 12 - 20 GFR est AA 41 (L) >60 ml/min/1.73m2 GFR est non-AA 34 (L) >60 ml/min/1.73m2 Calcium 8.3 (L) 8.5 - 10.1 MG/DL Bilirubin, total 1.1 (H) 0.2 - 1.0 MG/DL  
 ALT (SGPT) 67 12 - 78 U/L  
 AST (SGOT) 83 (H) 15 - 37 U/L Alk. phosphatase 290 (H) 45 - 117 U/L Protein, total 6.8 6.4 - 8.2 g/dL Albumin 3.1 (L) 3.5 - 5.0 g/dL Globulin 3.7 2.0 - 4.0 g/dL A-G Ratio 0.8 (L) 1.1 - 2.2 SAMPLES BEING HELD Collection Time: 12/25/19  1:07 PM  
Result Value Ref Range  SAMPLES BEING HELD 1RED 1BLU   
 COMMENT Add-on orders for these samples will be processed based on acceptable specimen integrity and analyte stability, which may vary by analyte. INFLUENZA A & B AG (RAPID TEST) Collection Time: 12/25/19  1:07 PM  
Result Value Ref Range Influenza A Antigen NEGATIVE  NEG Influenza B Antigen NEGATIVE  NEG    
TROPONIN I Collection Time: 12/25/19  1:07 PM  
Result Value Ref Range Troponin-I, Qt. 0.06 (H) <0.05 ng/mL EKG, 12 LEAD, INITIAL Collection Time: 12/25/19  1:08 PM  
Result Value Ref Range Ventricular Rate 102 BPM  
 Atrial Rate 102 BPM  
 P-R Interval 150 ms QRS Duration 80 ms  
 Q-T Interval 344 ms QTC Calculation (Bezet) 448 ms Calculated P Axis 64 degrees Calculated R Axis 14 degrees Calculated T Axis 109 degrees Diagnosis Sinus tachycardia ST & T wave abnormality, consider lateral ischemia When compared with ECG of 25-AUG-2019 00:57, No significant change was found URINALYSIS W/ REFLEX CULTURE Collection Time: 12/25/19  1:38 PM  
Result Value Ref Range Color YELLOW/STRAW Appearance CLOUDY (A) CLEAR Specific gravity 1.013 1.003 - 1.030    
 pH (UA) 5.5 5.0 - 8.0 Protein 100 (A) NEG mg/dL Glucose NEGATIVE  NEG mg/dL Ketone NEGATIVE  NEG mg/dL Bilirubin NEGATIVE  NEG Blood SMALL (A) NEG Urobilinogen 0.2 0.2 - 1.0 EU/dL Nitrites POSITIVE (A) NEG Leukocyte Esterase MODERATE (A) NEG    
 UA:UC IF INDICATED URINE CULTURE ORDERED (A) CNI    
 WBC >100 (H) 0 - 4 /hpf  
 RBC 0-5 0 - 5 /hpf Epithelial cells FEW FEW /lpf Bacteria 4+ (A) NEG /hpf Hyaline cast 2-5 0 - 5 /lpf URINE CULTURE HOLD SAMPLE Collection Time: 12/25/19  1:38 PM  
Result Value Ref Range Urine culture hold URINE ON HOLD IN MICROBIOLOGY DEPT FOR 3 DAYS. IF UNPRESERVED URINE IS SUBMITTED, IT CANNOT BE USED FOR ADDITIONAL TESTING AFTER 24 HRS, RECOLLECTION WILL BE REQUIRED. EKG, 12 LEAD, INITIAL Collection Time: 12/25/19  2:48 PM  
Result Value Ref Range Ventricular Rate 97 BPM  
 Atrial Rate 97 BPM  
 P-R Interval 166 ms  
 QRS Duration 84 ms Q-T Interval 354 ms QTC Calculation (Bezet) 449 ms Calculated P Axis 81 degrees Calculated R Axis 32 degrees Calculated T Axis 176 degrees Diagnosis Sinus rhythm with premature supraventricular complexes Anteroseptal infarct , age undetermined ST & T wave abnormality, consider inferolateral ischemia When compared with ECG of 25-DEC-2019 13:08, MANUAL COMPARISON REQUIRED, DATA IS UNCONFIRMED Chest x-ray: No acute findings. 6/2018 TTE: Left ventricle: Systolic function was moderately to markedly reduced. Ejection fraction was estimated to be 30 %. No obvious wall motion 
abnormalities identified in the views obtained. Assessment:  
 
Active Problems: 
  Septic shock ([address] (12/25/2019) Plan:  
- Wean pressors as tolerated - Cautious fluid resuscitation given heart failure - Continue vancomycin and cefepime until gram stain/cultures result 
- Continue to follow blood cultures - If patient able to produce sputum sample, please send  
- holding beta block and lasix in setting of septic shock  
- Clarify Xeloda dosing with hem/onc tomorrow- consult placed - Continue aspirin and plavix

## 2019-12-25 NOTE — PROGRESS NOTES
Pharmacist Note - Vancomycin Dosing Consult provided for this 82 y.o. female for indication of UTI. -metastatic cancer, on chemotherapy Antibiotic regimen(s): Vanc + Cefepime Recent Labs  
  19 
1307 WBC 33.0*  
CREA 1.48* BUN 35* Frequency of BMP: daily Height: 165.1 cm Weight: 59.7 kg Est CrCl: 26 ml/min; UO: -- ml/kg/hr Temp (24hrs), Av.3 °F (37.9 °C), Min:97.9 °F (36.6 °C), Max:102.8 °F (39.3 °C) Cultures: 
 blood - pending  urine - pending Goal trough = 10 - 15 mcg/mL Therapy will be initiated with a loading dose of 1250 mg IV x 1 to be followed by a maintenance dose of 750 mg IV every 24 hours. Pharmacy to follow patient daily and order levels / make dose adjustments as appropriate.

## 2019-12-25 NOTE — ED TRIAGE NOTES
She arrives alert with her family in a wheelchair. Her family says she is less alert since yesterday and has had a fever. She is a chemo patient and last had chemo two weeks ago. She has a diana cath. She is able to answer questions in triage.

## 2019-12-25 NOTE — ED NOTES
Alexander Akins MD notified of BP 83/37. See new order. [address] at bedside. [address] at bedside. [facility] Verbal report given to Shila(name) on [facility] being transferred to ICU(unit) for routine progression of care Report consisted of patient's Situation, Background, Assessment and Recommendations (SBAR) Information from the following report(s)  SBAR was reviewed with the receiving nurse. Opportunity for questions and clarification was provided. Patient transported with:  Monitor Registered Nurse Last Filed Values: 
Temp: 98.5 °F (36.9 °C) (12/25/19 1600) Pulse (Heart Rate): 92 (12/25/19 1630) Resp Rate: 21 (12/25/19 1630) O2 Sat (%): 96 % (12/25/19 1630) BP: 97/50 (12/25/19 1630) MAP (Monitor): (!) 61 (12/25/19 1630) MAP (Calculated): 66 (12/25/19 1600) Level of Consciousness: Alert (12/25/19 1600) Lab Results Component Value Date/Time WBC 33.0 (H) 12/25/2019 01:07 PM  
 
 
Repeat LA:  Time Due NA Blood Cultures Drawn:  yes Fluid Resuscitation:  Total needed 1768, Status completed, amount 1768 All Antibiotics Started:  yes, Dose Due No 
 
VS x 2 post-fluid resuscitation:   yes Vasopressor Infusion:  yes Levophed Provider Reassessment needed and notified:  yes , Due notified MD 
 
Additional Interventions/Comments:  NA

## 2019-12-26 NOTE — CONSULTS
ATSP with met breast cancer who was admitted yesterday with sepsis s/p chemotherapy Pt. Examined, chart reviewed, d/w family Consult dictated Osvaldo Dai MD

## 2019-12-26 NOTE — DIABETES MGMT
[facility] Progress Note Recommendations/ Comments: Chart review for extreme hyperglycemia - BG's 272 mg/dL - 304 mg/dL in past 24 hours and  
 mg/dL today with correctional lispro only. Elevated creatinine noted PO intake unknown If appropriate, please consider Addition of Lantus - start with 10 units and titrate as needed Document po intake to evaluate need for lispro AC Current hospital DM medication:  
Lispro normal sensitivity correction scale Chart reviewed on Sandy Ledesma. Patient is a 82 y.o. female with known DM on Tresiba 14 units daily and Novolog sliding scale AC TID Metastatic cancer; receiving chemo Consult for assessment of home management received. In ICU at this time. Will see when appropriate A1c:  
Lab Results Component Value Date/Time Hemoglobin A1c 6.7 (H) 12/26/2019 04:40 AM  
 Hemoglobin A1c 8.1 09/26/2018 Hemoglobin A1c 8.0 (H) 07/02/2018 09:40 AM  
 
 
Recent Glucose Results:  
Lab Results Component Value Date/Time  (H) 12/26/2019 04:40 AM  
  (H) 12/25/2019 07:16 PM  
  (H) 12/25/2019 01:07 PM  
 GLUCPOC 219 (H) 12/26/2019 07:05 AM  
 GLUCPOC 304 (H) 12/25/2019 10:09 PM  
 GLUCPOC 272 (H) 12/25/2019 07:15 PM  
  
 
Lab Results Component Value Date/Time Creatinine 1.44 (H) 12/26/2019 04:40 AM  
 
Estimated Creatinine Clearance: 27.1 mL/min (A) (based on SCr of 1.44 mg/dL (H)). Active Orders Diet DIET DIABETIC CONSISTENT CARB Regular PO intake: No data found. Will continue to follow as needed. Thank you Matteo Germain RN, CDE

## 2019-12-26 NOTE — PROGRESS NOTES
0740 Bedside and Verbal shift change report given to Susie Cox  (oncoming nurse) by Sandra Guthrie RN  (offgoing nurse). Report included the following information SBAR, Intake/Output, Cardiac Rhythm NSR and Alarm Parameters . Bedside and Verbal shift change report given to Audrey  (oncoming nurse) by Christina Rushing (offgoing nurse). Report included the following information SBAR, Cardiac Rhythm NSR and Alarm Parameters .

## 2019-12-26 NOTE — PROGRESS NOTES
Day #2 of Cefepime - Renal Dosing Update Indication:  GNR bacteremia likely 2/2 UTI Current regimen:  1 gm IV Q 12 hr Abx regimen: Cefepime + Vancomycin Recent Labs  
  19 
0440 19 
1916 19 
1307 WBC 28.1* 42.6* 33.0*  
CREA 1.44* 1.47* 1.48* BUN 37* 35* 35* Est CrCl: ~25-30 ml/min; UO: ~0.4 ml/kg/hr Temp (24hrs), Av.9 °F (36.6 °C), Min:97.7 °F (36.5 °C), Max:98.5 °F (36.9 °C) Cultures:  
 Blood: GNRs in 4/4 bottles, pending  Urine: >100k GNRs, pending Plan: Change to 2 gm IV Q 24 hr (an additional 1 gm will be given today) per [facility] P&T Committee Protocol with respect to renal function. Pharmacy will continue to monitor patient daily and will make dosage adjustments based upon changing renal function.

## 2019-12-26 NOTE — PROGRESS NOTES
2000:  Bedside and Verbal shift change report given to Reji Elizondo RN (oncoming nurse) by Haylee Pastrana RN (offgoing nurse). Report included the following information SBAR, Kardex, ED Summary, Procedure Summary, Intake/Output, MAR, Accordion, Recent Results, Med Rec Status, Cardiac Rhythm sinus rhythm and Alarm Parameters . Assumed care of patient whose lying in bed A&O x4. Sons at bedside. Levophed infusing at 6 mcg/min to maintain MAP >65.  purewick in place. Patient has no complaints at this time. She is eating dinner brought in by her son. 2115:  Advised MD patient needs sliding scale insulin coverage. Orders placed by MD. 
 
0100:  Patient states she is coughing. Lungs CTA. Only IVF at levophed at 11.3 mL/hr. Urine out put adequate. Will continue to monitor. 0730: Bedside and Verbal shift change report given to Chaz Pereyra RN (oncoming nurse) by Reji Elizondo RN (offgoing nurse). Report included the following information SBAR, Kardex, Procedure Summary, Intake/Output, MAR, Accordion, Recent Results, Med Rec Status, Cardiac Rhythm sinus rhythm and Dual Neuro Assessment.

## 2019-12-26 NOTE — PROGRESS NOTES
Clinical Pharmacy Note: Epogen Dosing Please note that the epogen dose for Muna Ibanez has been changed to 20,000 units twice weekly per [facility]-approved protocol. Please contact the pharmacy with any questions.  
 
Keeley Ibrahim [facility]

## 2019-12-26 NOTE — CONSULTS
Infectious Disease Consult Today's Date: 12/26/2019 Admit Date: 12/25/2019 Impression: · GNR bacteremia--seems most consistent with urinary source · Plan on a couple days IV antibiotic therapy and then re-culturing through the port--if still positive, then will need to have the report removed · Fevers Plan: · IV antibiotic therapy--stop vancomycin · Follow up cultures and studies and adjust meds as needed Anti-infectives: · Cefepime · Vancomycin Subjective:  
Date of Consultation:  December 26, 2019 Referring Physician: Dr Rayne Hdz Patient is a 82 y.o. female admitted with a picture of sepsis. She is undergoing chemotherapy for metastatic breast cancer and developed n/v and shaking chills the DOA. She has been found to have GNR in urine and blood and we are asked to see her in consultation. She states that she was having urinary frequency at home along with dysuria. Patient Active Problem List  
Diagnosis Code  Type 2 diabetes mellitus with neurologic complication, with long-term current use of insulin ([facility]) E11.49, Z79.4  Colon cancer ([facility] C18.9  Age-related osteoporosis without current pathological fracture M81.0  
 HTN, goal below 140/90 I10  Anemia D64.9  Hyperlipidemia E78.5  Carotid bruit R09.89  
 PAD (peripheral artery disease) ([facility]) I73.9  Weakness due to cerebrovascular accident [id number]  Seropositive rheumatoid arthritis of multiple sites ([facility] M05.79  
 Primary osteoarthritis of both knees M17.0  Long-term use of immunosuppressant medication Z79.899  Statin intolerance Z78.9  Asymptomatic hyperuricemia E79.0  Vitamin D deficiency E55.9  CKD (chronic kidney disease) stage 3, GFR 30-59 ml/min ([facility]) N18.3  Acute on chronic systolic HF (heart failure) ([facility]) I50.23  
 Metastatic breast cancer ([facility] C50.919  
 CAD (coronary artery disease) I25.10  Type 2 diabetes with nephropathy ([facility]) E11.21  
 Dizziness R42  Septic shock ([facility]) A41.9, R65.21 Past Medical History:  
Diagnosis Date  Acute on chronic systolic HF (heart failure) ([address] 2018  Age-related osteoporosis without current pathological fracture 2009  Anemia 2009  Asymptomatic hyperuricemia 2017  Breast cancer ([address]  CAD (coronary artery disease) 2018  Carotid bruit 2011  CHF (congestive heart failure) ([address]  CKD (chronic kidney disease) stage 3, GFR 30-59 ml/min ([facility]) 10/25/2017  Colon cancer ([address] 2009  
 surgery/chemo  Colon cancer ([address] 2009  DM (diabetes mellitus) ([address] 2009  GERD (gastroesophageal reflux disease)  H/O: CVA 2009  
 slight l sided weakness  Heart attack ([address]  HTN, goal below 140/90 2009  Hypothyroid 2009  Ill-defined condition   
 seasonal allergies  Long-term use of immunosuppressant medication 2016  Metastatic breast cancer ([address] 2018  Microalbuminuria 2009  Osteoporosis 2009  Other and unspecified hyperlipidemia 2010  PAD (peripheral artery disease) ([address] 2011  Primary osteoarthritis of both knees 2016  Rheumatoid arthritis involving ankle ([address] 2016  Rheumatoid arthritis(714.0) 2009  Seropositive rheumatoid arthritis of multiple sites ([address] 2016  Statin intolerance 2016  Type 2 diabetes mellitus with neurologic complication, with long-term current use of insulin ([address] 2009  Type 2 diabetes with nephropathy ([address] 2018  Unspecified essential hypertension 2009  Vitamin D deficiency 2017  Weakness due to cerebrovascular accident Family History Problem Relation Age of Onset  Diabetes Mother  Stroke Mother  Diabetes Sister  Other Sister   
     fell and hit her head -  of this  Diabetes Brother  Cancer Father   
     stomach  Diabetes Sister Social History Tobacco Use  
  Smoking status: Never Smoker  Smokeless tobacco: Never Used Substance Use Topics  Alcohol use: No  
 
Past Surgical History:  
Procedure Laterality Date  HX COLECTOMY  HX HYSTERECTOMY  HX OTHER SURGICAL    
 colonoscopies numerous since 1993 Prior to Admission medications Medication Sig Start Date End Date Taking? Authorizing Provider  
capecitabine (XELODA) 500 mg tablet Yes Provider, Historical  
PACLitaxel-Protein Bound (ABRAXANE) 100 mg chemo injection by IntraVENous route. Yes Provider, Historical  
furosemide (LASIX) 40 mg tablet TAKE ONE TABLET BY MOUTH EVERY OTHER DAY ALTERNATING WITH TAKE TWO TABLETS BY MOUTH EVERY OTHER DAY OR AS ADVISED BY PHYSICIAN 12/23/19  Yes Roby Alexander MD  
carvedilol (COREG) 12.5 mg tablet TAKE ONE TABLET BY MOUTH TWICE A DAY 10/8/19  Yes Roby Alexander MD  
rituximab (RITUXAN IV) 1,000 mg by IntraVENous route every 6 months. Yes Provider, Historical  
clopidogrel (PLAVIX) 75 mg tab TAKE ONE TABLET BY MOUTH DAILY 5/6/19  Yes Roby Alexander MD  
insulin aspart U-100 (NOVOLOG U-100 INSULIN ASPART) 100 unit/mL injection by SubCUTAneous route. Sliding scale   Indications: usually needs 2 to 3 units at dinner   Yes Provider, Historical  
insulin degludec (TRESIBA FLEXTOUCH U-100) 100 unit/mL (3 mL) inpn 14 Units by SubCUTAneous route daily. Yes Provider, Historical  
denosumab (XGEVA SC) by SubCUTAneous route every thirty (30) days. Yes Provider, Historical  
epoetin celio (PROCRIT) 10,000 unit/mL injection by SubCUTAneous route once. Unsure of dosage   Yes Provider, Historical  
REPATHA SURECLICK pen injection INJECT 1 ML SUBCUTANEOUS EVERY 14 DAYS 1/31/19  Yes Roby Alexander MD  
acetaminophen (TYLENOL) 325 mg tablet Take 2 Tabs by mouth every four (4) hours as needed. Patient taking differently: Take 1,000 mg by mouth every four (4) hours as needed.  6/21/18  Yes Renee Agudelo MD  
 b-complex with vitamin c tablet Take 1 Tab by mouth daily. 18  Yes Arabella Hutchison MD  
polyethylene glycol (MIRALAX) 17 gram packet Take 1 Packet by mouth daily. 18  Yes Lalita Guthrie MD  
levothyroxine (SYNTHROID) 88 mcg tablet Take 88 mcg by mouth Daily (before breakfast). Yes Provider, Historical  
aspirin delayed-release 81 mg tablet Take 81 mg by mouth daily. Yes Provider, Historical  
OMEGA-3 FATTY ACIDS/FISH OIL (OMEGA 3 FISH OIL PO) Take 300 mg by mouth daily. Yes Provider, Historical  
CHOLECALCIFEROL, VITAMIN D3, (VITAMIN D-3 PO) Take 1,000 Units by mouth daily. Yes Provider, Historical  
nitroglycerin (NITROSTAT) 0.4 mg SL tablet 1 Tab by SubLINGual route as needed for Chest Pain. Up to 3 doses. 18   Bindu Sr MD  
 
 
Allergies Allergen Reactions  Statins-Hmg-Coa Reductase Inhibitors Other (comments) Intolerant to statins  Sulfa (Sulfonamide Antibiotics) Other (comments)  
  syncope Review of Systems:  Pertinent items are noted in the History of Present Illness. Objective:  
 
Visit Vitals /53 (BP 1 Location: Left arm, BP Patient Position: At rest) Pulse 83 Temp 97.8 °F (36.6 °C) Resp 17 Ht 5' 5\" (1.651 m) Wt 59.7 kg (131 lb 9.8 oz) SpO2 100% BMI 21.90 kg/m² Temp (24hrs), Av.9 °F (36.6 °C), Min:97.7 °F (36.5 °C), Max:98.5 °F (36.9 °C) Lines:  Central Venous Catheter:    
 
Physical Exam:  Lungs:  clear to auscultation bilaterally Heart:  regular rate and rhythm Abdomen:  soft, non-tender. Bowel sounds normal. No masses,  no organomegaly Skin:  no rash or abnormalities Port site is benign Data Review: CBC: 
Recent Labs  
  19 
0440 19 
1916 19 
1307 WBC 28.1* 42.6* 33.0* GRANS 90* 89* 91* MONOS 4* 6 7 EOS 1 0 0 ANEU 25.9* 37.9* 30.0* ABL 0.8 0.4* 0.3* HGB 9.0* 8.6* 9.3* HCT 27.7* 27.4* 29.0*  
PLT 97* 87* 102* BMP: 
Recent Labs  
  19 
0440 19 1916 12/25/19 1307 CREA 1.44* 1.47* 1.48* BUN 37* 35* 35*  135* 134* K 3.8 3.7 3.4*  
 106 101 CO2 22 23 25 AGAP 8 6 8 * 279* 231* LFTS: 
Recent Labs  
  12/25/19 1307 TBILI 1.1* ALT 67 SGOT 83* * TP 6.8 ALB 3.1* Microbiology:  
 
All Micro Results Procedure Component Value Units Date/Time CULTURE, URINE [[id number]]  (Abnormal) Collected:  12/25/19 1338 Order Status:  Completed Specimen:  Urine Updated:  12/26/19 1041 Special Requests: --     
  NO SPECIAL REQUESTS Reflexed from [id number] [place] Count --     
  >100,000 COLONIES/mL Culture result: GRAM NEGATIVE RODS     
 CULTURE, BLOOD, PAIRED [[id number]]  (Abnormal) Collected:  12/25/19 1307 Order Status:  Completed Specimen:  Blood Updated:  12/26/19 0959 Special Requests: NO SPECIAL REQUESTS Culture result:    
  GRAM NEGATIVE RODS GROWING IN ALL 4 BOTTLES DRAWN (SITE = R AC AND L PORT) PRELIMINARY REPORT OF GRAM NEGATIVE RODS GROWING IN 3 OF 4 BOTTLES DRAWN CALLED TO AND READ BACK BY  6 [facility] [address], RN ON 12/26/19 AT 6263. SH  
     
      
  REMAINING BOTTLE(S) HAS/HAVE NO GROWTH SO FAR URINE CULTURE HOLD SAMPLE [[id number]] Collected:  12/25/19 1338 Order Status:  Completed Specimen:  Urine from Serum Updated:  12/25/19 1346 Urine culture hold URINE ON HOLD IN MICROBIOLOGY DEPT FOR 3 DAYS. IF UNPRESERVED URINE IS SUBMITTED, IT CANNOT BE USED FOR ADDITIONAL TESTING AFTER 24 HRS, RECOLLECTION WILL BE REQUIRED. INFLUENZA A & B AG (RAPID TEST) [[id number]] Collected:  12/25/19 1307 Order Status:  Completed Specimen:  Nasopharyngeal from Nasal washing Updated:  12/25/19 1336 Influenza A Antigen NEGATIVE Influenza B Antigen NEGATIVE Imaging:  
Reviewed Signed By: Debbie John MD   
 December 26, 2019

## 2019-12-26 NOTE — CONSULTS
[address] Name:  Kathleen Tamayo 
MR#:  [id number] :  1935 ACCOUNT #:  [de-identified] DATE OF SERVICE:  2019 HISTORY OF PRESENT ILLNESS:  The patient is an 82-year-old woman with a history of metastatic breast cancer who is seen at [facility] on  after her admission yesterday, Radha Day, for fever, vomiting and shaking chills. The patient was seen in the Emergency Department and found to have urinary tract infection. She was admitted to the intensive care unit for treatment of hypotension and sepsis. Since her admission her temperature has normalized, her blood cultures are now growing gram-negative rods. This patient states that she was feeling well until 24 hours prior to admission and over that 24-hour timeframe she noted low grade fever, chills and vomiting on two occasions. She has no other localizing symptoms of infection. PAST MEDICAL HISTORY:  Her past medical history is significant for metastatic breast cancer initially diagnosed in 2018 and treated with hormonal therapy. She had disease involving the skeleton - left chest wall, left axilla and continued Arimidex and Ibrance until 2019 when she had liver metastasis diagnosed and treated with Abraxane and Xeloda, chemotherapy every 3 weeks with a documented improvement in the liver lesions on CT scan early in December. Her last treatment was in the week of . She received Neulasta following this injection. Past medical history also includes congestive heart failure, coronary artery disease, MI, chronic kidney disease, rheumatoid arthritis, diabetes, GERD, CVA with left-sided weakness, hypothyroidism, osteoporosis, hypertension, peripheral artery disease. PAST SURGICAL HISTORY:  Includes colonoscopy, hemicolectomy, hysterectomy, Port-A-Cath placement. SOCIAL HISTORY:  She is .   She is originally from [place] [place], has a supportive family that lives here. She never smoked, never drank. She has a graduate degree. FAMILY HISTORY:  Her father had stomach cancer. ALLERGIES:  STATINS AND SULFA. REVIEW OF SYSTEMS:  She recently started Rituxan at the request of her rheumatologist and began treatment on 12/19. Review of systems otherwise noncontributory. PHYSICAL EXAMINATION: 
GENERAL:  She is a pleasant elderly woman in no apparent distress. VITAL SIGNS:  Stable. She is afebrile at present. HEENT:  Alopecia is present. EOMI. Nonicteric sclerae. NECK:  Supple. No lymphadenopathy noted. She has Port-A-Cath which is well situated. LUNGS:  Clear bilaterally. CARDIAC:  Regular rate and rhythm, I/VI systolic murmur. ABDOMEN:  Scaphoid, nontender. No hepatosplenomegaly noted. EXTREMITIES:  No clubbing, cyanosis or edema. NEUROLOGIC:  AO x3. BREASTS:  She has a palpable left breast mass. LABORATORY DATA:  Her CBC reveals a white count of 28, hemoglobin 9, platelet count 97. BUN and creatinine are 37 and 1.4. Albumin 3.1. Total bilirubin 1.1. IMPRESSION: 
1.  Gram-negative sepsis in a woman who received chemotherapy 2 weeks ago for metastatic breast cancer. The patient's blood cultures are growing gram-negative rods. She is currently on broad-spectrum antibiotics with good gram-negative of coverage. She is also on vancomycin. Infectious Disease has been consulted, so we will hold off on making any antibiotic changes. She is status post chemotherapy as I had mentioned but also received Neulasta, which contributed to the rise in her white blood cell count. 2.  Metastatic breast cancer. The patient is currently receiving Abraxane and Xeloda; this was an every 3-week regimen. The Xeloda is given for 7 days by mouth; her last dose of Xeloda was 1 week ago. She will not be receiving chemotherapy during this admission.   She did have a documented improvement in her CTs performed early in December. We will make decisions as to if and when to treat her after her discharge. 3.  Anemia. This was secondary to both chemotherapy as well as chronic kidney disease plus or minus occult gastrointestinal blood loss. I will treat her with Procrit during this admission and we will follow her laboratories daily. 4.  Thrombocytopenia. This is likely due to her chemotherapy plus or minus low-level consumption from sepsis. It is improved this morning, up from 87,000 to 97,000. She is unlikely to need any intervention for this problem. We will follow with you on behalf of [facility]. Agus Cash MD 
 
 
[id number] 
D:  12/26/2019 11:17 
T:  12/26/2019 12:28 
JOB #:  [id number]

## 2019-12-26 NOTE — PROGRESS NOTES
[facility] 
 
ICU TEAM Progress Note Name: Ingrid De Paz : 1935 MRN: [id number] Date: 2019 Subjective:  
Progress Note: 2019 Reason for ICU Admission: Septic shock of urinary origin - Metastatic breast cancer receiving active chemotherapy - GNR bacteremia - Problem list: HFrEF, CAD, previous MI with stents, hypothyroidism, RA, DM, GERD, CVA, hypothyroidism, HTN Overnight Events: No acute events overnight Active Problem List:  
 
Problem List  Date Reviewed: 2019 Codes Class Septic shock (HCC) ICD-10-CM: A41.9, R65.21 ICD-9-CM: 038.9, 785.52, 995.92 Dizziness ICD-10-CM: P09 ICD-9-CM: 780.4 Type 2 diabetes with nephropathy (HCC) ICD-10-CM: E11.21 
ICD-9-CM: 250.40, 583.81   
   
 CAD (coronary artery disease) ICD-10-CM: I25.10 ICD-9-CM: 414.00 Metastatic breast cancer ([facility] ICD-10-CM: J01.747 ICD-9-CM: 174.9 Acute on chronic systolic HF (heart failure) (HCC) ICD-10-CM: L04.58 ICD-9-CM: 428.23 CKD (chronic kidney disease) stage 3, GFR 30-59 ml/min (HCC) ICD-10-CM: N18.3 ICD-9-CM: 295. 3 Vitamin D deficiency ICD-10-CM: E55.9 ICD-9-CM: 268.9 Asymptomatic hyperuricemia ICD-10-CM: E79.0 ICD-9-CM: 790.6 Statin intolerance ICD-10-CM: Z78.9 ICD-9-CM: 995.27 Long-term use of immunosuppressant medication ICD-10-CM: Z79.899 ICD-9-CM: V58.69 Seropositive rheumatoid arthritis of multiple sites [facility]) ICD-10-CM: M05.79 ICD-9-CM: 714.0 Primary osteoarthritis of both knees ICD-10-CM: M17.0 ICD-9-CM: 715.16 Weakness due to cerebrovascular accident ICD-10-CM: [id number] ICD-9-CM: WUD2543 PAD (peripheral artery disease) (HCC) ICD-10-CM: I73.9 ICD-9-CM: 443.9 Carotid bruit ICD-10-CM: R09.89 ICD-9-CM: 828. 9 Hyperlipidemia ICD-10-CM: E78.5 ICD-9-CM: 272.4  Type 2 diabetes mellitus with neurologic complication, with long-term current use of insulin (HCC) ICD-10-CM: E11.49, Z79.4 ICD-9-CM: 250.60, V58.67 Colon cancer [facility]) ICD-10-CM: C18.9 ICD-9-CM: 153.9 Age-related osteoporosis without current pathological fracture ICD-10-CM: M81.0 ICD-9-CM: 733.01   
   
 HTN, goal below 140/90 ICD-10-CM: I10 
ICD-9-CM: 401.9 Anemia ICD-10-CM: D64.9 ICD-9-CM: 285.9 Past Medical History:  
 
 has a past medical history of Acute on chronic systolic HF (heart failure) ([facility] (5/19/2018), Age-related osteoporosis without current pathological fracture (9/2/2009), Anemia (9/2/2009), Asymptomatic hyperuricemia (2/16/2017), Breast cancer ([facility] CAD (coronary artery disease) (6/21/2018), Carotid bruit (8/31/2011), CHF (congestive heart failure) ([facility] CKD (chronic kidney disease) stage 3, GFR 30-59 ml/min ([facility] (10/25/2017), Colon cancer ([facility] (9/2/2009), Colon cancer ([facility] (9/2/2009), DM (diabetes mellitus) ([facility] (9/2/2009), GERD (gastroesophageal reflux disease), H/O: CVA (9/2/2009), Heart attack ([facility] HTN, goal below 140/90 (9/2/2009), Hypothyroid (9/2/2009), Ill-defined condition, Long-term use of immunosuppressant medication (11/21/2016), Metastatic breast cancer ([facility] (6/1/2018), Microalbuminuria (9/2/2009), Osteoporosis (9/2/2009), Other and unspecified hyperlipidemia (1/27/2010), PAD (peripheral artery disease) ([facility] (9/7/2011), Primary osteoarthritis of both knees (9/28/2016), Rheumatoid arthritis involving ankle ([facility] (9/28/2016), Rheumatoid arthritis(714.0) (9/2/2009), Seropositive rheumatoid arthritis of multiple sites ([facility] (9/28/2016), Statin intolerance (12/21/2016), Type 2 diabetes mellitus with neurologic complication, with long-term current use of insulin ([facility] (9/2/2009), Type 2 diabetes with nephropathy ([facility] (8/20/2018), Unspecified essential hypertension (9/2/2009), Vitamin D deficiency (6/16/2017), and Weakness due to cerebrovascular accident. Past Surgical History: has a past surgical history that includes hx colectomy; hx hysterectomy; and hx other surgical. 
 
Home Medications:  
 
Prior to Admission medications Medication Sig Start Date End Date Taking? Authorizing Provider  
capecitabine (XELODA) 500 mg tablet Yes Provider, Historical  
PACLitaxel-Protein Bound (ABRAXANE) 100 mg chemo injection by IntraVENous route. Yes Provider, Historical  
furosemide (LASIX) 40 mg tablet TAKE ONE TABLET BY MOUTH EVERY OTHER DAY ALTERNATING WITH TAKE TWO TABLETS BY MOUTH EVERY OTHER DAY OR AS ADVISED BY PHYSICIAN 12/23/19  Yes Cherri Sterling MD  
carvedilol (COREG) 12.5 mg tablet TAKE ONE TABLET BY MOUTH TWICE A DAY 10/8/19  Yes Cherri Sterling MD  
rituximab (RITUXAN IV) 1,000 mg by IntraVENous route every 6 months. Yes Provider, Historical  
clopidogrel (PLAVIX) 75 mg tab TAKE ONE TABLET BY MOUTH DAILY 5/6/19  Yes Cherri Sterling MD  
insulin aspart U-100 (NOVOLOG U-100 INSULIN ASPART) 100 unit/mL injection by SubCUTAneous route. Sliding scale   Indications: usually needs 2 to 3 units at dinner   Yes Provider, Historical  
insulin degludec (TRESIBA FLEXTOUCH U-100) 100 unit/mL (3 mL) inpn 14 Units by SubCUTAneous route daily. Yes Provider, Historical  
denosumab (XGEVA SC) by SubCUTAneous route every thirty (30) days. Yes Provider, Historical  
epoetin celio (PROCRIT) 10,000 unit/mL injection by SubCUTAneous route once. Unsure of dosage   Yes Provider, Historical  
REPATHA SURECLICK pen injection INJECT 1 ML SUBCUTANEOUS EVERY 14 DAYS 1/31/19  Yes Cherri Sterling MD  
acetaminophen (TYLENOL) 325 mg tablet Take 2 Tabs by mouth every four (4) hours as needed. Patient taking differently: Take 1,000 mg by mouth every four (4) hours as needed. 6/21/18  Yes Adriana Novoa MD  
b-complex with vitamin c tablet Take 1 Tab by mouth daily. 6/22/18  Yes Adriana Novoa MD  
polyethylene glycol (MIRALAX) 17 gram packet Take 1 Packet by mouth daily. 18  Yes Carter Reid MD  
levothyroxine (SYNTHROID) 88 mcg tablet Take 88 mcg by mouth Daily (before breakfast). Yes Provider, Historical  
aspirin delayed-release 81 mg tablet Take 81 mg by mouth daily. Yes Provider, Historical  
OMEGA-3 FATTY ACIDS/FISH OIL (OMEGA 3 FISH OIL PO) Take 300 mg by mouth daily. Yes Provider, Historical  
CHOLECALCIFEROL, VITAMIN D3, (VITAMIN D-3 PO) Take 1,000 Units by mouth daily. Yes Provider, Historical  
nitroglycerin (NITROSTAT) 0.4 mg SL tablet 1 Tab by SubLINGual route as needed for Chest Pain. Up to 3 doses. 18   Bruno Tay MD  
 
 
Allergies/Social/Family History: Allergies Allergen Reactions  Statins-Hmg-Coa Reductase Inhibitors Other (comments) Intolerant to statins  Sulfa (Sulfonamide Antibiotics) Other (comments)  
  syncope Social History Tobacco Use  Smoking status: Never Smoker  Smokeless tobacco: Never Used Substance Use Topics  Alcohol use: No  
  
Family History Problem Relation Age of Onset  Diabetes Mother  Stroke Mother  Diabetes Sister  Other Sister   
     fell and hit her head -  of this  Diabetes Brother  Cancer Father   
     stomach  Diabetes Sister Review of Systems: A comprehensive review of systems was negative. Objective:  
Vital Signs: 
Visit Vitals /54 (BP 1 Location: Left arm, BP Patient Position: At rest) Pulse 85 Temp 97.8 °F (36.6 °C) Resp 19 Ht 5' 5\" (1.651 m) Comment: Historic Wt 59.7 kg (131 lb 9.8 oz) SpO2 100% BMI 21.90 kg/m² O2 Device: Room air Temp (24hrs), Av.8 °F (36.6 °C), Min:97.7 °F (36.5 °C), Max:97.8 °F (36.6 °C) Intake/Output:  
 
Intake/Output Summary (Last 24 hours) at 2019 1804 Last data filed at 2019 1500 Gross per 24 hour Intake 876.24 ml Output 600 ml Net 276.24 ml Physical Exam: 
 
General:  Alert, cooperative, well noursished, well developed, appears stated age Eyes:  Sclera anicteric. Pupils equally round and reactive to light. Mouth/Throat: Mucous membranes normal, oral pharynx clear Neck: Supple Lungs:   Clear to auscultation bilaterally, good effort CV:  Regular rate and rhythm,no murmur, click, rub or gallop Abdomen:   Soft, non-tender. bowel sounds normal. non-distended Extremities: No cyanosis or edema Skin: Skin color, texture, turgor normal. no acute rash or lesions Lymph nodes: Cervical and supraclavicular normal  
Musculoskeletal: No swelling or deformity Lines/Devices:  Intact, no erythema, drainage or tenderness Psych: Alert and oriented, normal mood affect given the setting LABS AND  DATA: Personally reviewed Recent Labs  
  12/26/19 0440 12/25/19 1916 WBC 28.1* 42.6* HGB 9.0* 8.6* HCT 27.7* 27.4* PLT 97* 87* Recent Labs  
  12/26/19 0440 12/25/19 1916  135* K 3.8 3.7  106 CO2 22 23 BUN 37* 35* CREA 1.44* 1.47* * 279* CA 7.3* 7.1*  
MG 1.5* 1.4* PHOS 3.2 3.4 Recent Labs  
  12/25/19 
1307 SGOT 83* * TP 6.8 ALB 3.1*  
GLOB 3.7 No results for input(s): INR, PTP, APTT, INREXT in the last 72 hours. No results for input(s): PHI, PCO2I, PO2I, FIO2I in the last 72 hours. Recent Labs  
  12/25/19 1916 12/25/19 
1307 TROIQ 0.91* 0.06* Hemodynamics:  
PAP:   CO:    
Wedge:   CI:    
CVP:    SVR:    
  PVR:    
 
Ventilator Settings: 
Mode Rate Tidal Volume Pressure FiO2 PEEP Peak airway pressure:     
Minute ventilation:     
 
 
MEDS: Reviewed Chest X-Ray: personally reviewed and report checked 6/2018 TTE: Left ventricle: Systolic function was moderately to markedly reduced. Ejection fraction was estimated to be 30 %.  No obvious wall motion 
abnormalities identified in the views obtained. 
  
Pericardium: A small pericardial effusion was identified circumferential 
 to the heart. There was no evidence of hemodynamic compromise Assessment:  
 
ICU Problems: 
Septic shock due to UTI 
GNR bacteremia ICU Comprehensive Plan of Care:  
Plans for this Shift:  
1. Given 25g albumin 2. Wean from vsopressors 3. Discussed with ID- plan for 48 hours of abx and redraw blood culture from port- if positive port will need to be replaced 4. Continue cefepime Multidisciplinary Rounds Completed: Yes ABCDEF Bundle/Checklist 
Pain Medications: None Target RASS: 0 - Alert & Calm - Spontaneously pays attention to caregiver Sedation Medications: None CAM-ICU:  Negative Mobility: Good PT/OT: PT consulted and on board and OT consulted and on board Restraints: None needed at this time Discussed Plan of Care (goals of care): Yes Addressed Code Status: Full Code CARDIOVASCULAR Cardiac Gtts: Norepinephrine SBP Goal of: > 90 mmHg MAP Goal of: > 65 mmHg Transfusion Trigger (Hgb): <7 g/dL and <50K platelets RESPIRATORY Vent Goals:  
Head of bed > 30 degrees Incentive spirometry DVT Prophylaxis (if no, list reason): SCDs, ASA, plavix SPO2 Goal: > 92% Pulmonary toilet: Incentive Spirometry GI/ Alford Catheter Present: No 
GI Prophylaxis: Not at this time Nutrition: Yes CC diet IVFs: no Bowel Movement: No 
Bowel Regimen: None needed at this time Insulin: SSI ANTIBIOTICS Antibiotics: 
Cefepime T/L/D Tubes: None Lines: [employer] Drains: None SPECIAL EQUIPMENT None DISPOSITION Stay in ICU CRITICAL CARE CONSULTANT NOTE I had a face to face encounter with the patient, reviewed and interpreted patient data including clinical events, labs, images, vital signs, I/O's, and examined patient.   I have discussed the case and the plan and management of the patient's care with the consulting services, the bedside nurses and the respiratory therapist.   
 
NOTE OF PERSONAL INVOLVEMENT IN CARE  
 This patient has a high probability of imminent, clinically significant deterioration, which requires the highest level of preparedness to intervene urgently. I participated in the decision-making and personally managed or directed the management of the following life and organ supporting interventions that required my frequent assessment to treat or prevent imminent deterioration. I personally spent 45 minutes of critical care time. This is time spent at this critically ill patient's bedside actively involved in patient care as well as the coordination of care and discussions with the patient's family. This does not include any procedural time which has been billed separately. [facility] 
12/26/2019

## 2019-12-27 NOTE — PROGRESS NOTES
Hematology-Oncology Progress Note    Romina Flores  1935  [id number]  12/27/2019    Follow-up for: met. Breast cancer     [x]        Chart notes since last visit reviewed   [x]        Medications reviewed for allergies and interactions       Case discussed with the following:         []                            []        Nursing Staff                                                                         [x]        Pathologist                                                                        [x]        FAMILY      Subjective:     Spoke with patient who complains of: pt developed acute chest pain;/dyspnea at midnight last night. . her troponins are elevated , pain is resolved, she is breathing more comfortably this am     Objective:     Patient Vitals for the past 24 hrs:   BP Temp Pulse Resp SpO2   12/27/19 0800 113/54 97.5 °F (36.4 °C) (!) 101 21 98 %   12/27/19 0400 121/64  (!) 110 27 97 %   12/27/19 0328     97 %   12/27/19 0300 (!) 138/115  (!) 122 30 94 %   12/27/19 0200 (!) 148/91  (!) 124 (!) 34 97 %   12/27/19 0100 125/76  (!) 122 (!) 34 92 %   12/27/19 0000  100.3 °F (37.9 °C) (!) 119 (!) 32 96 %   12/26/19 2300 125/65  (!) 114 30 96 %   12/26/19 2200 121/58  (!) 108 (!) 44 96 %   12/26/19 2100 131/69  (!) 105 24 95 %   12/26/19 2000 103/86 99.4 °F (37.4 °C) (!) 103 26 95 %   12/26/19 1900 120/75  100 26 98 %   12/26/19 1800 154/62  90 21 98 %   12/26/19 1700 118/58  90 24 99 %   12/26/19 1600 121/54 98.1 °F (36.7 °C) 85 19 100 %   12/26/19 1500 115/54  88 16 100 %   12/26/19 1400 114/59  88 20 99 %   12/26/19 1300 131/57  83 21 99 %   12/26/19 1200 111/53 97.8 °F (36.6 °C) 83 17 100 %   12/26/19 1100 112/49  82 17 100 %       REVIEW OF SYSTEMS:    Constitutional: negative fever, negative chills, negative weight loss  Eyes:   negative visual changes  ENT:   negative sore throat, tongue or lip swelling  Respiratory:  negative cough, negative dyspnea  Cards:  negative for chest pain, palpitations, lower extremity edema  GI:   negative for nausea, vomiting, diarrhea, and abdominal pain  Neuro:  negative for headaches, dizziness, vertigo  []                        Full ROS o/w normal/non contributor    Constitutional:  Patient looks  [x]        Sick  [x]        Frail  []        Better                                                 []        Depressed    HEENT:  [x]   NC                         []   AT               []    ALOPECIA     High flow oxygen in place      Eyes: [x]   Normal               []    Icteric  Oropharynx: []    Normal                  []  Thrush               []   Dry  Mucositis: []    None                 Grade: []        I  []        II  []        III  []        IV  Neck:   [x]   Supple                  []  Rigid                 [x]        Normal  []        Confused      Available labs reviewed:  Labs:    Recent Results (from the past 24 hour(s))   GLUCOSE, POC    Collection Time: 12/26/19 11:56 AM   Result Value Ref Range    Glucose (POC) 229 (H) 65 - 100 mg/dL    Performed by Yinka Dupree Webb 136, POC    Collection Time: 12/26/19  5:21 PM   Result Value Ref Range    Glucose (POC) 189 (H) 65 - 100 mg/dL    Performed by Yinka Dupree [address] 136, POC    Collection Time: 12/26/19  9:28 PM   Result Value Ref Range    Glucose (POC) 188 (H) 65 - 100 mg/dL    Performed by Amanda Allegerald    CBC WITH AUTOMATED DIFF    Collection Time: 12/27/19  2:30 AM   Result Value Ref Range    WBC 26.4 (H) 3.6 - 11.0 K/uL    RBC 2.94 (L) 3.80 - 5.20 M/uL    HGB 9.5 (L) 11.5 - 16.0 g/dL    HCT 29.3 (L) 35.0 - 47.0 %    MCV 99.7 (H) 80.0 - 99.0 FL    MCH 32.3 26.0 - 34.0 PG    MCHC 32.4 30.0 - 36.5 g/dL    RDW 18.9 (H) 11.5 - 14.5 %    PLATELET 757 (L) 348 - 400 K/uL    MPV 11.6 8.9 - 12.9 FL    NRBC 0.5 (H) 0  WBC    ABSOLUTE NRBC 0.12 (H) 0.00 - 0.01 K/uL    NEUTROPHILS 86 (H) 32 - 75 %    BAND NEUTROPHILS 1 0 - 6 %    LYMPHOCYTES 6 (L) 12 - 49 %    MONOCYTES 7 5 - 13 %    EOSINOPHILS 0 0 - 7 %    BASOPHILS 0 0 - 1 %    IMMATURE GRANULOCYTES 0 %    ABS. NEUTROPHILS 23.0 (H) 1.8 - 8.0 K/UL    ABS. LYMPHOCYTES 1.6 0.8 - 3.5 K/UL    ABS. MONOCYTES 1.8 (H) 0.0 - 1.0 K/UL    ABS. EOSINOPHILS 0.0 0.0 - 0.4 K/UL    ABS. BASOPHILS 0.0 0.0 - 0.1 K/UL    ABS. IMM. GRANS. 0.0 K/UL    DF MANUAL      RBC COMMENTS ANISOCYTOSIS  1+        RBC COMMENTS MACROCYTOSIS  1+        RBC COMMENTS HYPOCHROMIA  1+        RBC COMMENTS RBC FRAGMENTS PRESENT    FERRITIN    Collection Time: 12/27/19  2:30 AM   Result Value Ref Range    Ferritin 619 (H) 8 - 252 NG/ML   TROPONIN I    Collection Time: 12/27/19  2:30 AM   Result Value Ref Range    Troponin-I, Qt. 8.10 (H) <0.05 ng/mL   CK W/ CKMB & INDEX    Collection Time: 12/27/19  2:30 AM   Result Value Ref Range    CK 79 26 - 192 U/L    CK - MB 4.2 (H) <3.6 NG/ML    CK-MB Index 5.3 (H) 0.0 - 2.5     METABOLIC PANEL, COMPREHENSIVE    Collection Time: 12/27/19  2:30 AM   Result Value Ref Range    Sodium 135 (L) 136 - 145 mmol/L    Potassium 3.9 3.5 - 5.1 mmol/L    Chloride 106 97 - 108 mmol/L    CO2 22 21 - 32 mmol/L    Anion gap 7 5 - 15 mmol/L    Glucose 250 (H) 65 - 100 mg/dL    BUN 36 (H) 6 - 20 MG/DL    Creatinine 1.32 (H) 0.55 - 1.02 MG/DL    BUN/Creatinine ratio 27 (H) 12 - 20      GFR est AA 47 (L) >60 ml/min/1.73m2    GFR est non-AA 39 (L) >60 ml/min/1.73m2    Calcium 7.4 (L) 8.5 - 10.1 MG/DL    Bilirubin, total 0.5 0.2 - 1.0 MG/DL    ALT (SGPT) 45 12 - 78 U/L    AST (SGOT) 38 (H) 15 - 37 U/L    Alk.  phosphatase 187 (H) 45 - 117 U/L    Protein, total 6.4 6.4 - 8.2 g/dL    Albumin 2.8 (L) 3.5 - 5.0 g/dL    Globulin 3.6 2.0 - 4.0 g/dL    A-G Ratio 0.8 (L) 1.1 - 2.2     PHOSPHORUS    Collection Time: 12/27/19  2:30 AM   Result Value Ref Range    Phosphorus 2.1 (L) 2.6 - 4.7 MG/DL   POC G3 - PUL    Collection Time: 12/27/19  3:03 AM   Result Value Ref Range    pH (POC) 7.250 (L) 7.35 - 7.45      pCO2 (POC) 47.2 (H) 35.0 - 45.0 MMHG    pO2 (POC) 73 (L) 80 - 100 MMHG    HCO3 (POC) 20.7 (L) 22 - 26 MMOL/L    sO2 (POC) 92 92 - 97 %    Base deficit (POC) 7 mmol/L    Site RIGHT RADIAL      Device: NASAL CANNULA      Flow rate (POC) 2 L/M    Allens test (POC) YES      Specimen type (POC) ARTERIAL      Total resp. rate 28     EKG, 12 LEAD, INITIAL    Collection Time: 12/27/19  3:40 AM   Result Value Ref Range    Ventricular Rate 122 BPM    Atrial Rate 122 BPM    P-R Interval 140 ms    QRS Duration 90 ms    Q-T Interval 310 ms    QTC Calculation (Bezet) 441 ms    Calculated P Axis 63 degrees    Calculated R Axis 33 degrees    Calculated T Axis 134 degrees    Diagnosis       Sinus tachycardia  Anteroseptal infarct (cited on or before 25-DEC-2019)  ST & T wave abnormality, consider lateral ischemia  When compared with ECG of 25-DEC-2019 14:48,  premature supraventricular complexes are no longer present  Serial changes of Anteroseptal infarct present     PTT    Collection Time: 12/27/19  4:03 AM   Result Value Ref Range    aPTT 33.4 (H) 22.1 - 32.0 sec    aPTT, therapeutic range     58.0 - 77.0 SECS   GLUCOSE, POC    Collection Time: 12/27/19  7:14 AM   Result Value Ref Range    Glucose (POC) 257 (H) 65 - 100 mg/dL    Performed by [facility]    Collection Time: 12/27/19  7:15 AM   Result Value Ref Range    Magnesium 2.2 1.6 - 2.4 mg/dL   TROPONIN I    Collection Time: 12/27/19  7:15 AM   Result Value Ref Range    Troponin-I, Qt. 10.60 (H) <0.05 ng/mL       Available Xrays reviewed:    Chemotherapy monitored and toxicities assessed:    Assessment and Plan   1. Met. Breast cancer. .. pt. Has had nice response to chemo (abraxane/xeloda) with recent documented decrease in hepatic and axillary involvement. .. her last rx was 12/16 (cycle 5 ) she received 7 days of xeloda ending on 12/22. Peggyann Gang than neulasta on 12/23. .. she is \"due\" next week this will be held    2. Leukocytosis secondary to neulasta and sepsis. Peggyann Gang improving    3. Anemia. Peggyann Gang  secondary to chemotherapy/ckd,, hgb 9.5 today    4. Gram negative sepsis 4/4 bttls +. .. NO ID yet. .. pt was doing well yesterday until the cardiac event occurred. .continue current abx    5. Chest pain / sob. .. the pt had similar problems one year ago when I met her , she had a prolonged ICU stay back then. .. cardiology is involved we will defer mgmt . ..      D/w son    Honorio Valladares MD  .

## 2019-12-27 NOTE — PROGRESS NOTES
[facility] Adult  Hospitalist Group     ICU Transfer/Accept Summary   PATIENT ID: Fleet Area  MRN: [id number]   YOB: 1935    PRIMARY CARE PROVIDER: Carlos Miner MD   DATE OF ADMISSION: 12/25/2019 12:22 PM    ATTENDING PHYSICIAN: Willam Primrose, MD  CONSULTATIONS:   IP CONSULT TO INTENSIVIST  IP CONSULT TO INFECTIOUS DISEASES  IP CONSULT TO HEMATOLOGY  IP CONSULT TO CARDIOLOGY    PROCEDURES/SURGERIES:   * No surgery found *    REASON FOR ADMISSION: <principal problem not specified>     HOSPITAL PROBLEM LIST:  Patient Active Problem List   Diagnosis Code    Type 2 diabetes mellitus with neurologic complication, with long-term current use of insulin ([facility] E11.49, Z79.4    Colon cancer ([facility] C18.9    Age-related osteoporosis without current pathological fracture M81.0    HTN, goal below 140/90 I10    Anemia D64.9    Hyperlipidemia E78.5    Carotid bruit R09.89    PAD (peripheral artery disease) ([facility]) I73.9    Weakness due to cerebrovascular accident [id number]    Seropositive rheumatoid arthritis of multiple sites ([facility] M05.79    Primary osteoarthritis of both knees M17.0    Long-term use of immunosuppressant medication Z79.899    Statin intolerance Z78.9    Asymptomatic hyperuricemia E79.0    Vitamin D deficiency E55.9    CKD (chronic kidney disease) stage 3, GFR 30-59 ml/min ([facility]) N18.3    Acute on chronic systolic HF (heart failure) ([facility]) I50.23    Metastatic breast cancer ([facility] C50.919    CAD (coronary artery disease) I25.10    Type 2 diabetes with nephropathy ([facility]) E11.21    Dizziness R42    Septic shock ([facility]) A41.9, R65.21       [place] [address] [place]  DATE OF TRANSFER: 12/27/2019       Brief HPI and Hospital Course:      Patient is a 82 y.o. female admitted to ICU on 12/25 with a picture of sepsis with pressors. She is undergoing chemotherapy for metastatic breast cancer and developed n/v and shaking chills the DOA.   BP improved and off pressor. Blood culture + for GNR.    had acute hypoxia resp due to fluid overload 12/26 night and NSTEMI this early am .   Improved breathing 12/27 pm after 80mg lasix x2 and will transfer out ICU tonight (12/27)    currently no chest pain, feel left calf pain. Assessment and Plan:  1. Septic shock due to UTI with GNR bacteremia: Off pressor 12/26. ID on board. IV abx cefepime and following culture. Will repeat blood culture in am     2. Acute hypoxic respiratory failure due to CHF exacerbation: received 80mg iv lasix x2 today. Was on high flow and much improved. Will continue lasix 40mg daily start in am and will adjust based on her urine output and renal function. 3. Acute on chronic systolic CHF: NYHA 4. PLAN AS ABOVE. Ef 30-35%. Will restart her coreg when bp normal. Not sure why she is not on ARBs or ACE-I    4. NSTEMI: medical management with asa and plavix. Cardiologist on board. Will start BB when BP tolerate     5. Met. Breast cancer: followed by Dr. Deirdre Painting. 6. Chronic Anemia: monitor HB, transfuse prn if Hb< 8 due to her NSTEMI   7. Left calf pain: check venous doppler   8. DM: SSI   Full code        PHYSICAL EXAMINATION:  Visit Vitals  /49   Pulse 87   Temp 97.9 °F (36.6 °C)   Resp 21   Ht 5' 5\" (1.651 m) Comment: Historic   Wt 61.8 kg (136 lb 3.9 oz)   SpO2 100%   BMI 22.67 kg/m²       General:          Alert, cooperative, no distress  HEENT:           Atraumatic, MMM            Neck:               Supple, symmetrical,  thyroid: non tender  Lungs:             bilat crackles. No wheezing. Heart:              Regular  rhythm,  No  murmur   No edema  Abdomen:       Soft, non-tender. Not distended. Bowel sounds normal  Extremities:     No cyanosis. No clubbing,  +2 distal pulses  Skin:                Not pale. Not Jaundiced  No rashes no edema. Psych:             Not anxious or agitated.   Neurologic:      Alert, moves all extremities, oriented X 3.     CODE STATUS:  x Full Code    DNR Partial    Comfort Care     Signed:   Klarissa Hunt MD  12/27/2019  5:56 PM

## 2019-12-27 NOTE — PROGRESS NOTES
ID Progress Note  2019    Subjective:     She is not feeling all that great--poor appetite    Objective:     Antibiotics:  1. Cefepime       Vitals:   Visit Vitals  /50   Pulse 86   Temp 97.5 °F (36.4 °C)   Resp 19   Ht 5' 5\" (1.651 m) Comment: Historic   Wt 59.7 kg (131 lb 9.8 oz)   SpO2 98%   BMI 21.90 kg/m²        Tmax:  Temp (24hrs), Av.8 °F (37.1 °C), Min:97.5 °F (36.4 °C), Max:100.3 °F (37.9 °C)      Exam:  Lungs:  clear to auscultation bilaterally  Heart:  regular rate and rhythm  Abdomen:  soft, non-tender. Bowel sounds normal. No masses,  no organomegaly  Skin:  no rash or abnormalities  Port is benign    Labs:      Recent Labs     19  0230 19  0440 19  1916 19  1307   WBC 26.4* 28.1* 42.6* 33.0*   HGB 9.5* 9.0* 8.6* 9.3*   * 97* 87* 102*   BUN 36* 37* 35* 35*   CREA 1.32* 1.44* 1.47* 1.48*   SGOT 38*  --   --  83*   *  --   --  290*   TBILI 0.5  --   --  1.1*       Cultures:     No results found for: [facility]  Lab Results   Component Value Date/Time    Culture result: GRAM NEGATIVE RODS (A) 2019 01:38 PM    Culture result: (A) 2019 01:07 PM     GRAM NEGATIVE RODS GROWING IN ALL 4 BOTTLES DRAWN (SITE = R AC AND L PORT)    Culture result: (A) 2019 01:07 PM     PRELIMINARY REPORT OF GRAM NEGATIVE RODS GROWING IN 3 OF 4 BOTTLES DRAWN CALLED TO AND READ BACK BY  Altagracia Cox RN ON 19 AT 1578. 31 Mari Mullins    Culture result: REMAINING BOTTLE(S) HAS/HAVE NO GROWTH SO FAR 2019 01:07 PM       Radiology:     Line/Insert Date:           Assessment:     1. Bacteremia--likely urinary source  2. Breast cancer, metastatic  3. Leukocytosis--improved  4. Fevers--resolved    Objective:     1. Continue current therapy  2. Follow up cultures and adjust antibiotics as needed  3.  Group will check cultures over the weekend--call if she needs to be seen    Meredith Huerta MD

## 2019-12-27 NOTE — PROGRESS NOTES
Reason for Admission:   Septic shock               RRAT Score:     70/ high             Resources/supports as identified by patient/family:   Family is very involved and supportive per son. Top Challenges facing patient (as identified by patient/family and CM):  Decline in health                     Finances/Medication cost?      Son indicated no concerns with paying for medication. Transportation? BLS versus son to transport              Support system or lack thereof? Both sons are supportive and patient has 7 days a week/ 8 hours daily personal care aides                     Living arrangements? Lives with adult son at this point           Self-care/ADLs/Cognition? Patient requires assistance with self care/ ADLS and support with cognition. Current Advanced Directive/Advance Care Plan:  Full code status/ advanced care plan is on file                          Plan for utilizing home health:    Patient has personal care aides/ not HH at this point. CM will need to follow for transition of care needs. Transition of Care Plan: This CM met with patient's son Marleni Ledesma-[phone] to explain CM role in transition of care. Josh Ledesma- other son-can be reached at [phone]. Per son, patient has 7 days a week/ 8 hours a day  (personal care aide) through [employer] [facility] reachable at [phone]. Patient does have a Bipap and a walker with wheels at home, per son. Care Management Interventions  PCP Verified by CM:  Yes  Palliative Care Criteria Met (RRAT>21 & CHF Dx)?: Yes  Palliative Consult Recommended?: (CM to follow)  Mode of Transport at Discharge: BLS(BLS versus son to trasport)  Transition of Care Consult (CM Consult): (TBD)  Neelima Signup: No  Discharge Durable Medical Equipment: No  Health Maintenance Reviewed: Yes  Physical Therapy Consult: No  Occupational Therapy Consult: No  Speech Therapy Consult: No  Current Support Network: (lives with family)  Confirm Follow Up Transport: Family   Resource Information Provided?: No  Discharge Location  Discharge Placement: (TBD/ CM will need to follow)    Rosio Landis  11:30 AM

## 2019-12-27 NOTE — CONSULTS
Cardiology Consult Note      Patient Name: Jason Anderson  : 1935 MRN: [id number]  Date: 2019  Time: 10:46 AM    Admit Diagnosis: Septic shock ([facility] [A41.9, R65.21]    Primary Cardiologist: Gomez Krause MD   Consulting Cardiologist: Genoveva Gardner MD    Reason for Consult: trop elevation    Requesting MD: Andres De Souza MD    HPI:  Jason Anderson is a 82 y.o. female admitted on 2019  for Septic shock ([address] [A41.9, R65.21].    has a past medical history of Acute on chronic systolic HF (heart failure) ([address] (2018), Age-related osteoporosis without current pathological fracture (2009), Anemia (2009), Asymptomatic hyperuricemia (2017), Breast cancer ([address] CAD (coronary artery disease) (2018), Carotid bruit (2011), CHF (congestive heart failure) ([address] CKD (chronic kidney disease) stage 3, GFR 30-59 ml/min ([address] (10/25/2017), Colon cancer ([address] (2009), Colon cancer ([address] (2009), DM (diabetes mellitus) ([address] (2009), GERD (gastroesophageal reflux disease), H/O: CVA (2009), Heart attack ([address] HTN, goal below 140/90 (2009), Hypothyroid (2009), Ill-defined condition, Long-term use of immunosuppressant medication (2016), Metastatic breast cancer ([address] (2018), Microalbuminuria (2009), Osteoporosis (2009), Other and unspecified hyperlipidemia (2010), PAD (peripheral artery disease) ([address] (2011), Primary osteoarthritis of both knees (2016), Rheumatoid arthritis involving ankle ([facility] (2016), Rheumatoid arthritis(714.0) (2009), Seropositive rheumatoid arthritis of multiple sites ([facility] (2016), Statin intolerance (2016), Type 2 diabetes mellitus with neurologic complication, with long-term current use of insulin ([facility] (2009), Type 2 diabetes with nephropathy ([facility] (2018), Unspecified essential hypertension (2009), Vitamin D deficiency (6/16/2017), and Weakness due to cerebrovascular accident. Presented for lethargy, fevers and chills. Admitted for sepsis management. Subjective:  \"I'm not feeling well\". HiFlow O2, SOB, CP. She looks weak      Assessment and Plan     1. Trop elevation   - Mixed CAD and demand   - Continue supportive care   - ASA and Plavix  2. Septic shock   - per Primary team  3. CAD   - cath 2018 - Severe diffuse 3 vessel disease multiple lesions not a candidate for CABG will treat medically  4. Metastatic BrCa   - Heme/Onc following   - active chemo  5. Systolic HF   - EF 21-71%, unchanged since June     Extensive CAD, as noted on cath in 2018. Not amenable to intervention or bypass. Echo with unchanged EF. D/w son, nothing more to do from cardiac standpoint, but continue medical management. Globally, she is declining quickly. Unfortunately, her son continues with full code, does not want to speak to Palliative care. Cardiology Attending:Patient seen and examined. I agree with NP assessment and plans. Will follow. EF still about 35%, severe diffuse CAD, metastatic cancer and sepsis.     Ashley Disla MD 12/27/2019 2:04 PM       Patient Active Problem List   Diagnosis Code    Type 2 diabetes mellitus with neurologic complication, with long-term current use of insulin ([facility] E11.49, Z79.4    Colon cancer ([facility] C18.9    Age-related osteoporosis without current pathological fracture M81.0    HTN, goal below 140/90 I10    Anemia D64.9    Hyperlipidemia E78.5    Carotid bruit R09.89    PAD (peripheral artery disease) ([facility]) I73.9    Weakness due to cerebrovascular accident [id number]    Seropositive rheumatoid arthritis of multiple sites ([facility] M05.79    Primary osteoarthritis of both knees M17.0    Long-term use of immunosuppressant medication Z79.899    Statin intolerance Z78.9    Asymptomatic hyperuricemia E79.0    Vitamin D deficiency E55.9    CKD (chronic kidney disease) stage 3, GFR 30-59 ml/min ([facility]) N18.3    Acute on chronic systolic HF (heart failure) ([facility]) I50.23    Metastatic breast cancer ([facility] C50.919    CAD (coronary artery disease) I25.10    Type 2 diabetes with nephropathy ([facility]) E11.21    Dizziness R42    Septic shock ([facility]) A41.9, R65.21     6/18 echo lvef 30%, trivial pericardial effusion      Review of Systems:    [x] Patient unable to provide secondary to condition    [] All systems negative, except as checked below.   Constitutional:    []Weight Change  []Fever   []Chills   []Night Sweats  []Fatigue  []Malaise  []____  ENT/Mouth:     []Hearing Changes  []Ear Pain  []Nasal Congestion   []Sinus Pain  []Hoarseness   []Sore throat  []Rhinorrhea  []Swallowing Difficulty  []____  Eyes:    []Eye Pain  []Swelling  []Redness  []Foreign Body  []Discharge  []Vision Changes  []____  Cardiovascular:    []Chest Pain  []SOB  []PND  []REDDY  []Orthopnea  []Claudication  []Edema   []Palpitations  []____  Respiratory:    []Cough  []Sputum  []Wheezing,  []SOB  []Hemoptysis  []____  Gastrointestinal:    []Nausea  []Vomiting  []Diarrhea  []Constipation  []Pain  []Heartburn  []Anorexia  []Dysphagia  []Hematochezia  []Melena,  []Jaundice  []____  Genitourinary:    []Dysuria  []Urinary Frequency  []Hematuria  []Urinary Incontinence  []Urgency  []Flank Pain  []Hesitancy  []____  Musculoskeletal:    []Arthralgias  []Myalgias  []Joint Swelling  []Joint Stiffness  []Back Pain  []Neck Pain  []____  Skin:    []Skin Lesions  []Pruritis  []Hair Changes  []Skin rashes  []____  Neuro:    []Weakness  []Numbness  []Paresthesias  []Loss of Consciousness  []Syncope   []Dizziness  []Headache  []Coordination Changes  []Recent Falls  []____  Psych:    []Anxiety/Depression  []Insomnia  []Memory Changes  []Violence/Abuse Hx.  []____  Heme/Lymph:    []Bruising  []Bleeding  []Lymphadenopathy  []____  Endocrine:    []Polyuria  []Polydipsia  []Temperature Intolerance  []____         Previous treatment/evaluation includes   Cardiac catheterization  Cardiac risk factors:   post-menopausal.    Past Medical History:   Diagnosis Date    Acute on chronic systolic HF (heart failure) ([address] 5/19/2018    Age-related osteoporosis without current pathological fracture 9/2/2009    Anemia 9/2/2009    Asymptomatic hyperuricemia 2/16/2017    Breast cancer ([address]     CAD (coronary artery disease) 6/21/2018    Carotid bruit 8/31/2011    CHF (congestive heart failure) ([facility])     CKD (chronic kidney disease) stage 3, GFR 30-59 ml/min ([facility]) 10/25/2017    Colon cancer ([facility] 9/2/2009    surgery/chemo    Colon cancer ([address] 9/2/2009    DM (diabetes mellitus) ([address] 9/2/2009    GERD (gastroesophageal reflux disease)     H/O: CVA 9/2/2009    slight l sided weakness    Heart attack ([address]     HTN, goal below 140/90 9/2/2009    Hypothyroid 9/2/2009    Ill-defined condition     seasonal allergies    Long-term use of immunosuppressant medication 11/21/2016    Metastatic breast cancer ([address] 6/1/2018    Microalbuminuria 9/2/2009    Osteoporosis 9/2/2009    Other and unspecified hyperlipidemia 1/27/2010    PAD (peripheral artery disease) ([address] 9/7/2011    Primary osteoarthritis of both knees 9/28/2016    Rheumatoid arthritis involving ankle ([address] 9/28/2016    Rheumatoid arthritis(714.0) 9/2/2009    Seropositive rheumatoid arthritis of multiple sites ([address] 9/28/2016    Statin intolerance 12/21/2016    Type 2 diabetes mellitus with neurologic complication, with long-term current use of insulin ([address] 9/2/2009    Type 2 diabetes with nephropathy ([address] 8/20/2018    Unspecified essential hypertension 9/2/2009    Vitamin D deficiency 6/16/2017    Weakness due to cerebrovascular accident      Past Surgical History:   Procedure Laterality Date    HX COLECTOMY      HX HYSTERECTOMY      HX OTHER SURGICAL      colonoscopies numerous since 1993     Current Facility-Administered Medications   Medication Dose Route Frequency    polyethylene glycol (MIRALAX) packet 17 g  17 g Oral DAILY    senna (SENOKOT) tablet 17.2 mg  2 Tab Oral DAILY    oxyCODONE IR (ROXICODONE) tablet 2.5 mg  2.5 mg Oral Q4H PRN    cefepime (MAXIPIME) 2 g in 0.9% sodium chloride (MBP/ADV) 100 mL  2 g IntraVENous Q24H    simethicone (MYLICON) tablet 80 mg  80 mg Oral QID PRN    sodium chloride (NS) flush 5-10 mL  5-10 mL IntraVENous PRN    NOREPINephrine (LEVOPHED) 8 mg in 5% dextrose 250mL (32 mcg/mL) infusion  0.5-16 mcg/min IntraVENous TITRATE    sodium chloride (NS) flush 5-40 mL  5-40 mL IntraVENous Q8H    sodium chloride (NS) flush 5-40 mL  5-40 mL IntraVENous PRN    clopidogrel (PLAVIX) tablet 75 mg  75 mg Oral DAILY    aspirin chewable tablet 81 mg  81 mg Oral DAILY    levothyroxine (SYNTHROID) tablet 88 mcg  88 mcg Oral 6am    glucose chewable tablet 16 g  4 Tab Oral PRN    glucagon (GLUCAGEN) injection 1 mg  1 mg IntraMUSCular PRN    dextrose 10% infusion 0-250 mL  0-250 mL IntraVENous PRN    insulin lispro (HUMALOG) injection   SubCUTAneous AC&HS       Allergies   Allergen Reactions    Statins-Hmg-Coa Reductase Inhibitors Other (comments)     Intolerant to statins     Sulfa (Sulfonamide Antibiotics) Other (comments)     syncope      Family History   Problem Relation Age of Onset    Diabetes Mother     Stroke Mother     Diabetes Sister     Other Sister         fell and hit her head -  of this   Tray Holms Diabetes Brother     Cancer Father         stomach    Diabetes Sister       Social History     Socioeconomic History    Marital status:      Spouse name: Not on file    Number of children: Not on file    Years of education: Not on file    Highest education level: Not on file   Tobacco Use    Smoking status: Never Smoker    Smokeless tobacco: Never Used   Substance and Sexual Activity    Alcohol use: No    Drug use: No    Sexual activity: Not Currently     Partners: Male       Objective:    Physical Exam    Vitals:   Vitals:    19 0600 19 0700 12/27/19 0800 12/27/19 1045   BP: 118/60 114/60 113/54 106/65   Pulse: (!) 101 95 (!) 101    Resp: 21 18 21    Temp:   97.5 °F (36.4 °C)    SpO2: 97% 98% 98%    Weight:       Height:           General:    Alert, cooperative, no distress, appears weak, frail. Neck:   Supple, no carotid bruit and no JVD. Back:     Not assessed    Lungs:     Clear to auscultation bilaterally. Heart[de-identified]    Regular rate and rhythm, S1, S2 normal, no murmur, click, rub or gallop. Abdomen:     Soft, non-tender. Bowel sounds normal.    Extremities:   Extremities normal, atraumatic, no cyanosis or edema. Vascular:   Pulses - 1+ radials   Skin:   Skin color normal. No rashes or lesions   Neurologic:   CN II-XII grossly intact.         Telemetry: normal sinus rhythm    ECG:   EKG Results     Procedure 720 Value Units Date/Time    EKG, 12 LEAD, INITIAL [[id number]] Collected:  12/27/19 0340    Order Status:  Completed Updated:  12/27/19 0239     Ventricular Rate 122 BPM      Atrial Rate 122 BPM      P-R Interval 140 ms      QRS Duration 90 ms      Q-T Interval 310 ms      QTC Calculation (Bezet) 441 ms      Calculated P Axis 63 degrees      Calculated R Axis 33 degrees      Calculated T Axis 134 degrees      Diagnosis --     Sinus tachycardia  Anteroseptal infarct (cited on or before 25-DEC-2019)  ST & T wave abnormality, consider lateral ischemia  When compared with ECG of 25-DEC-2019 14:48,  premature supraventricular complexes are no longer present  Serial changes of Anteroseptal infarct present      EKG 12 LEAD INITIAL [[id number]] Collected:  12/25/19 1448    Order Status:  Completed Updated:  12/26/19 0946     Ventricular Rate 97 BPM      Atrial Rate 97 BPM      P-R Interval 166 ms      QRS Duration 84 ms      Q-T Interval 354 ms      QTC Calculation (Bezet) 449 ms      Calculated P Axis 81 degrees      Calculated R Axis 32 degrees      Calculated T Axis 176 degrees      Diagnosis --     Sinus rhythm with premature supraventricular complexes  Anteroseptal infarct , age undetermined  ST & T wave abnormality, consider inferolateral ischemia  When compared with ECG of 25-DEC-2019 13:08,  MANUAL COMPARISON REQUIRED, DATA IS UNCONFIRMED  Confirmed by Terrance Coley [id number]) on 12/26/2019 9:46:17 AM      EKG, 12 LEAD, INITIAL [[id number]] Collected:  12/25/19 1308    Order Status:  Completed Updated:  12/26/19 0946     Ventricular Rate 102 BPM      Atrial Rate 102 BPM      P-R Interval 150 ms      QRS Duration 80 ms      Q-T Interval 344 ms      QTC Calculation (Bezet) 448 ms      Calculated P Axis 64 degrees      Calculated R Axis 14 degrees      Calculated T Axis 109 degrees      Diagnosis --     Sinus tachycardia  ST & T wave abnormality, consider lateral ischemia  When compared with ECG of 25-AUG-2019 00:57,  No significant change was found  Confirmed by Terrance Coley ([id number]) on 12/26/2019 9:46:06 AM              Data Review:     Radiology:   XR Results (most recent):  Results from [place] encounter on 12/25/19   XR CHEST PORT    Narrative EXAM: XR CHEST PORT    INDICATION: cough fever lethargy x 2 days    COMPARISON: October 1    FINDINGS: A portable AP radiograph of the chest was obtained at 1225 hours. The  portacatheter is seen in place. The patient is on a cardiac monitor. There is  scarring in the lungs bilaterally and a calcified granuloma is seen in the right  upper lobe. There is atherosclerosis of the aorta. There are multiple sclerotic  lesions in the bones. Impression IMPRESSION: No acute findings.          Recent Labs     12/27/19  0715 12/27/19 0230 12/25/19  1916   CPK  --  79  --    TROIQ 10.60* 8.10* 0.91*     Recent Labs     12/27/19  0230 12/26/19  0440   * 136   K 3.9 3.8    106   CO2 22 22   BUN 36* 37*   CREA 1.32* 1.44*   * 250*   PHOS 2.1* 3.2   CA 7.4* 7.3*     Recent Labs     12/27/19  0230 12/26/19  0440   WBC 26.4* 28.1*   HGB 9.5* 9.0*   HCT 29.3* 27.7*   * 97*     Recent Labs 12/27/19  0230 12/25/19  1307   SGOT 38* 83*   * 290*     No results for input(s): CHOL, LDLC in the last 72 hours. No lab exists for component: TGL, HDLC,  HBA1C  No results for input(s): CRP, TSH, TSHEXT in the last 72 hours. No lab exists for component: ESR    [address] Cecile Zhang MD         [facility] [address] [place] [address] [address] [place] [address]     [phone]    CC: Breezy Gomez MD

## 2019-12-27 NOTE — PROGRESS NOTES
Patient was noted to be more tachycardic, with elevated RR and hypoxia, EKG/Troponin obtained, no clear evidence of ST elevation, troponin 8.1, concern for primary cardiac event, POCUS showed diffuse B lines, placed patient on HFNC, Cardiology consulted, Dr. Krys Diggs recommended Heparin gtt and is aware of patient's elevated troponin, hypoxia and ICU team's concern for occlusive MI. Ruma David MD

## 2019-12-27 NOTE — PROGRESS NOTES
0120:  Son concerned with patient's presentation. Patient tachycardic, tachypnic with O2 sats 92-93%. Per MD placed patient on O2 @ 2 LPM and reassess in 1 hour. 0210: Advised MD patient's vital signs have not improved and patient is now diaphoretic. Order for EKG and lab work placed per MD.  
 
Devon Howard:  EKG sent to CCU at Dr Sean Montalvo request for second opinion. Per CCU RN, EKG looks ok. 0300:  ABGs done 0310:  Dr Germania Sam at bedside preforming bedside ultrasound. Order for lasix to be given. 0330:  Critical results received. Per Dr Yandy Soria c/s now. Dr Radha Oseguera's group called. Dr Debbi Justin is on call. 0345:  repaged Dr Rubi Garrett. 
 
4791:  Dr Rubi Garrett returned page and spoke directly to Dr Germania Sam. Orders received from Dr Germania Sam.  
 
0400:  Dr Germania Sam at bedside looking at patient's heart wall and function via ultrasound. Heparin gtt started. 0730: Mg was ordered at 0230 but had not resulted. Called lab. She will add Mg on to troponin that was drawn around 0715. 
 
0745:  vascuslar called to verify if a stat echo needed to be done. 0730: Bedside and Verbal shift change report given to Loreto Staton RN (oncoming nurse) by Chuck Faulkner RN (offgoing nurse). Report included the following information SBAR, Kardex, Procedure Summary, Intake/Output, MAR, Accordion, Recent Results, Med Rec Status, Cardiac Rhythm sinus tach and Dual Neuro Assessment.

## 2019-12-27 NOTE — PROGRESS NOTES
[facility] 
 
ICU TEAM Progress Note Name: Katherin Charles : 1935 MRN: [id number] Date: 2019 Subjective:  
Progress Note: 2019 Reason for ICU Admission: Septic shock of urinary origin - Metastatic breast cancer receiving active chemotherapy - GNR bacteremia - Problem list: HFrEF, CAD, previous MI with stents, hypothyroidism, RA, DM, GERD, CVA, hypothyroidism, HTN Overnight Events:  
- Pt with NSTEMI overnight- heparin gtt started and cardiology consulted Active Problem List:  
 
Problem List  Date Reviewed: 2019 Codes Class Septic shock (HCC) ICD-10-CM: A41.9, R65.21 ICD-9-CM: 038.9, 785.52, 995.92 Dizziness ICD-10-CM: M14 ICD-9-CM: 780.4 Type 2 diabetes with nephropathy (HCC) ICD-10-CM: E11.21 
ICD-9-CM: 250.40, 583.81   
   
 CAD (coronary artery disease) ICD-10-CM: I25.10 ICD-9-CM: 414.00 Metastatic breast cancer ([facility] ICD-10-CM: Q84.479 ICD-9-CM: 174.9 Acute on chronic systolic HF (heart failure) (HCC) ICD-10-CM: L73.20 ICD-9-CM: 428.23 CKD (chronic kidney disease) stage 3, GFR 30-59 ml/min (HCC) ICD-10-CM: N18.3 ICD-9-CM: 379. 3 Vitamin D deficiency ICD-10-CM: E55.9 ICD-9-CM: 268.9 Asymptomatic hyperuricemia ICD-10-CM: E79.0 ICD-9-CM: 790.6 Statin intolerance ICD-10-CM: Z78.9 ICD-9-CM: 995.27 Long-term use of immunosuppressant medication ICD-10-CM: Z79.899 ICD-9-CM: V58.69 Seropositive rheumatoid arthritis of multiple sites [facility]) ICD-10-CM: M05.79 ICD-9-CM: 714.0 Primary osteoarthritis of both knees ICD-10-CM: M17.0 ICD-9-CM: 715.16 Weakness due to cerebrovascular accident ICD-10-CM: [id number] ICD-9-CM: QNT2003 PAD (peripheral artery disease) (HCC) ICD-10-CM: I73.9 ICD-9-CM: 443.9 Carotid bruit ICD-10-CM: R09.89 ICD-9-CM: 893. 9 Hyperlipidemia ICD-10-CM: E78.5 ICD-9-CM: 272.4 Type 2 diabetes mellitus with neurologic complication, with long-term current use of insulin (HCC) ICD-10-CM: E11.49, Z79.4 ICD-9-CM: 250.60, V58.67 Colon cancer [facility]) ICD-10-CM: C18.9 ICD-9-CM: 153.9 Age-related osteoporosis without current pathological fracture ICD-10-CM: M81.0 ICD-9-CM: 733.01   
   
 HTN, goal below 140/90 ICD-10-CM: I10 
ICD-9-CM: 401.9 Anemia ICD-10-CM: D64.9 ICD-9-CM: 285.9 Past Medical History:  
 
 has a past medical history of Acute on chronic systolic HF [facility] (5/19/2018), Age-related osteoporosis without current pathological fracture (9/2/2009), Anemia (9/2/2009), Asymptomatic hyperuricemia (2/16/2017), [facility] cancer ([facility] CAD (coronary artery disease) (6/21/2018), Carotid bruit (8/31/2011), CHF (congestive heart failure) ([facility] CKD (chronic kidney disease) stage 3, GFR 30-59 ml/min ([facility] (10/25/2017), Colon cancer ([facility] (9/2/2009), [facility] ([facility] (9/2/2009), DM (diabetes mellitus) ([facility] (9/2/2009), GERD (gastroesophageal reflux disease), H/O: CVA (9/2/2009), Heart attack ([facility] HTN, goal below 140/90 (9/2/2009), Hypothyroid (9/2/2009), Ill-defined condition, Long-term use of immunosuppressant medication (11/21/2016), Metastatic breast cancer ([facility] (6/1/2018), Microalbuminuria (9/2/2009), Osteoporosis (9/2/2009), Other and unspecified hyperlipidemia (1/27/2010), PAD (peripheral artery disease) ([facility] (9/7/2011), Primary osteoarthritis of both knees (9/28/2016), Rheumatoid arthritis involving ankle ([facility] (9/28/2016), Rheumatoid arthritis(714.0) (9/2/2009), Seropositive rheumatoid arthritis of multiple sites ([facility] (9/28/2016), Statin intolerance (12/21/2016), Type 2 diabetes mellitus with neurologic complication, with long-term current use of insulin ([facility] (9/2/2009), Type 2 diabetes with nephropathy ([facility] (8/20/2018), Unspecified essential hypertension (9/2/2009), Vitamin D deficiency (6/16/2017), and Weakness due to cerebrovascular accident. Past Surgical History:  
 
 has a past surgical history that includes hx colectomy; hx hysterectomy; and hx other surgical. 
 
Home Medications:  
 
Prior to Admission medications Medication Sig Start Date End Date Taking? Authorizing Provider  
capecitabine (XELODA) 500 mg tablet Yes Provider, Historical  
PACLitaxel-Protein Bound (ABRAXANE) 100 mg chemo injection by IntraVENous route. Yes Provider, Historical  
furosemide (LASIX) 40 mg tablet TAKE ONE TABLET BY MOUTH EVERY OTHER DAY ALTERNATING WITH TAKE TWO TABLETS BY MOUTH EVERY OTHER DAY OR AS ADVISED BY PHYSICIAN 12/23/19  Yes Onel Odell MD  
carvedilol (COREG) 12.5 mg tablet TAKE ONE TABLET BY MOUTH TWICE A DAY 10/8/19  Yes Onel Odell MD  
rituximab (RITUXAN IV) 1,000 mg by IntraVENous route every 6 months. Yes Provider, Historical  
clopidogrel (PLAVIX) 75 mg tab TAKE ONE TABLET BY MOUTH DAILY 5/6/19  Yes Onel Odell MD  
insulin aspart U-100 (NOVOLOG U-100 INSULIN ASPART) 100 unit/mL injection by SubCUTAneous route. Sliding scale   Indications: usually needs 2 to 3 units at dinner   Yes Provider, Historical  
insulin degludec (TRESIBA FLEXTOUCH U-100) 100 unit/mL (3 mL) inpn 14 Units by SubCUTAneous route daily. Yes Provider, Historical  
denosumab (XGEVA SC) by SubCUTAneous route every thirty (30) days. Yes Provider, Historical  
epoetin celio (PROCRIT) 10,000 unit/mL injection by SubCUTAneous route once. Unsure of dosage   Yes Provider, Historical  
REPATHA SURECLICK pen injection INJECT 1 ML SUBCUTANEOUS EVERY 14 DAYS 1/31/19  Yes Onel Odlel MD  
acetaminophen (TYLENOL) 325 mg tablet Take 2 Tabs by mouth every four (4) hours as needed. Patient taking differently: Take 1,000 mg by mouth every four (4) hours as needed. 6/21/18  Yes Sandy Palmer MD  
b-complex with vitamin c tablet Take 1 Tab by mouth daily.  6/22/18  Yes Sandy Palmer MD  
 polyethylene glycol (MIRALAX) 17 gram packet Take 1 Packet by mouth daily. 18  Yes Rian Fuentes MD  
levothyroxine (SYNTHROID) 88 mcg tablet Take 88 mcg by mouth Daily (before breakfast). Yes Provider, Historical  
aspirin delayed-release 81 mg tablet Take 81 mg by mouth daily. Yes Provider, Historical  
OMEGA-3 FATTY ACIDS/FISH OIL (OMEGA 3 FISH OIL PO) Take 300 mg by mouth daily. Yes Provider, Historical  
CHOLECALCIFEROL, VITAMIN D3, (VITAMIN D-3 PO) Take 1,000 Units by mouth daily. Yes Provider, Historical  
nitroglycerin (NITROSTAT) 0.4 mg SL tablet 1 Tab by SubLINGual route as needed for Chest Pain. Up to 3 doses. 18   Sander Garcia MD  
 
 
Allergies/Social/Family History: Allergies Allergen Reactions  Statins-Hmg-Coa Reductase Inhibitors Other (comments) Intolerant to statins  Sulfa (Sulfonamide Antibiotics) Other (comments)  
  syncope Social History Tobacco Use  Smoking status: Never Smoker  Smokeless tobacco: Never Used Substance Use Topics  Alcohol use: No  
  
Family History Problem Relation Age of Onset  Diabetes Mother  Stroke Mother  Diabetes Sister  Other Sister   
     fell and hit her head -  of this  Diabetes Brother  Cancer Father   
     stomach  Diabetes Sister Review of Systems:  
 
Complaining of abdominal pain- states she has gas and has not had BM in several days Objective:  
Vital Signs: 
Visit Vitals /54 (BP 1 Location: Left arm, BP Patient Position: At rest) Pulse (!) 101 Temp 97.5 °F (36.4 °C) Resp 21 Ht 5' 5\" (1.651 m) Comment: Historic Wt 59.7 kg (131 lb 9.8 oz) SpO2 98% BMI 21.90 kg/m² O2 Flow Rate (L/min): 50 l/min O2 Device: Hi flow nasal cannula Temp (24hrs), Av.6 °F (37 °C), Min:97.5 °F (36.4 °C), Max:100.3 °F (37.9 °C) Intake/Output:  
 
Intake/Output Summary (Last 24 hours) at 2019 6006 Last data filed at 2019 0400 Gross per 24 hour Intake 656.74 ml Output 1200 ml Net -543.26 ml Physical Exam: 
 
General:  Alert, cooperative, alopecia, chronically ill appearing Eyes:  Sclera anicteric. Pupils equally round and reactive to light. Mouth/Throat: Mucous membranes normal, oral pharynx clear Neck: Supple Lungs:   Clear to auscultation bilaterally, good effort CV:  Regular rate and rhythm,no murmur, click, rub or gallop Abdomen:   Soft, non-tender. bowel sounds normal. non-distended Extremities: No cyanosis or edema Skin: Skin color, texture, turgor normal. no acute rash or lesions Lymph nodes: Cervical and supraclavicular normal  
Musculoskeletal: No swelling or deformity Lines/Devices:  Intact, no erythema, drainage or tenderness Psych: Alert and oriented, normal mood affect given the setting LABS AND  DATA: Personally reviewed Recent Labs  
  12/27/19 
0230 12/26/19 
0440 WBC 26.4* 28.1* HGB 9.5* 9.0*  
HCT 29.3* 27.7*  
* 97* Recent Labs  
  12/27/19 
0715 12/27/19 
0230 12/26/19 
0440 NA  --  135* 136 K  --  3.9 3.8 CL  --  106 106 CO2  --  22 22 BUN  --  36* 37* CREA  --  1.32* 1.44* GLU  --  250* 250* CA  --  7.4* 7.3*  
MG 2.2  --  1.5* PHOS  --  2.1* 3.2 Recent Labs  
  12/27/19 
0230 12/25/19 
1307 SGOT 38* 83* * 290* TP 6.4 6.8 ALB 2.8* 3.1*  
GLOB 3.6 3.7 Recent Labs  
  12/27/19 
0403 APTT 33.4* Recent Labs  
  12/27/19 
0303 PHI 7.250* PCO2I 47.2*  
PO2I 73* Recent Labs  
  12/27/19 
0715 12/27/19 
0230 CPK  --  79 CKMB  --  4.2*  
TROIQ 10.60* 8.10* MEDS: Reviewed Chest X-Ray: personally reviewed and report checked 6/2018 TTE: Left ventricle: Systolic function was moderately to markedly reduced. Ejection fraction was estimated to be 30 %.  No obvious wall motion 
abnormalities identified in the views obtained. 
  
Pericardium: A small pericardial effusion was identified circumferential 
 to the heart. There was no evidence of hemodynamic compromise Assessment:  
 
ICU Problems: 
Septic shock due to UTI 
GNR bacteremia HFrEF 
NSTEMI Acute hypoxic respiratory failure CHF exacerbation ICU Comprehensive Plan of Care:  
Plans for this Shift: 1. Patient with NSTEMI overnight, cardiology consulted, have no further interventions to offer, heparin gtt stopped per Cardiology, plan to medically manage 2. Oxycodone 2.5mg q4h PRN ordered for chest pain 3. Senna, miralax, and simethicone ordered for gas pain and constipation 4. Continue cefepime for treatment of UTI and GNR bacteremia (will need 7 day course of antimicrobials) 5. Recheck blood culture from port tomorrow morning- if positive from port then will need to contact IR for replacement 6. Diuresis with Lasix 80 mg, plan for net negative 500 mL to 1L today 7. Wean oxygen as tolerated Multidisciplinary Rounds Completed: Yes ABCDEF Bundle/Checklist 
Pain Medications: None Target RASS: 0 - Alert & Calm - Spontaneously pays attention to caregiver Sedation Medications: None CAM-ICU:  Negative Mobility: Good PT/OT: PT consulted and on board and OT consulted and on board Restraints: None needed at this time Discussed Plan of Care (goals of care): Yes Addressed Code Status: Full Code CARDIOVASCULAR Cardiac Gtts: None SBP Goal of: > 90 mmHg MAP Goal of: > 65 mmHg Transfusion Trigger (Hgb): <7 g/dL and <50K platelets RESPIRATORY Vent Goals:  
Head of bed > 30 degrees Incentive spirometry DVT Prophylaxis (if no, list reason): SCDs, ASA, plavix SPO2 Goal: > 92% Pulmonary toilet: Incentive Spirometry GI/ [place] Catheter Present: No 
GI Prophylaxis: Not at this time Nutrition: Yes CC diet IVFs: no Bowel Movement: No 
Bowel Regimen: Senna and miralax Insulin: SSI ANTIBIOTICS Antibiotics: 
Cefepime T/L/D Tubes: None Lines: [employer] Drains: None SPECIAL EQUIPMENT None DISPOSITION 
 Transfer to floor CRITICAL CARE CONSULTANT NOTE I had a face to face encounter with the patient, reviewed and interpreted patient data including clinical events, labs, images, vital signs, I/O's, and examined patient. I have discussed the case and the plan and management of the patient's care with the consulting services, the bedside nurses and the respiratory therapist.   
 

## 2019-12-27 NOTE — DIABETES MGMT
[facility]    DTC Progress Note    Recommendations/ Comments: Chart review for hyperglycemia - BG's 188 mg/dL - 257 mg/dL in past 24 hours and    mg/dL today with correctional lispro only. Elevated creatinine noted  PO intake unknown    If appropriate, please consider   Addition of Lantus - start with 10 units and titrate as needed  Document po intake to evaluate need for lispro AC      Current hospital DM medication:   Lispro normal sensitivity correction scale    Chart reviewed on Sandy Ledesma. Patient is a 82 y.o. female with known DM on Tresiba 14 units daily and Novolog sliding scale AC TID  Metastatic cancer; receiving chemo  Consult for assessment of home management received. In ICU at this time. Will see when appropriate    A1c:   Lab Results   Component Value Date/Time    Hemoglobin A1c 6.7 (H) 12/26/2019 04:40 AM    Hemoglobin A1c 8.1 09/26/2018    Hemoglobin A1c 8.0 (H) 07/02/2018 09:40 AM       Recent Glucose Results:   Lab Results   Component Value Date/Time     (H) 12/27/2019 02:30 AM    GLUCPOC 257 (H) 12/27/2019 07:14 AM    GLUCPOC 188 (H) 12/26/2019 09:28 PM    GLUCPOC 189 (H) 12/26/2019 05:21 PM        Lab Results   Component Value Date/Time    Creatinine 1.32 (H) 12/27/2019 02:30 AM     Estimated Creatinine Clearance: 29.6 mL/min (A) (based on SCr of 1.32 mg/dL (H)). Active Orders   Diet    DIET DIABETIC CONSISTENT CARB Regular        PO intake: No data found. Will continue to follow as needed.     Thank you  Matteo Germain RN, CDE

## 2019-12-28 NOTE — PROGRESS NOTES
12/28/2019     Admit Date: 12/25/2019    Admit Diagnosis: [facility] [A41.9, R65.21]    Active Problems:    Septic shock ([facility] (12/25/2019)        Subjective:  Out of icu. Comfortable on room air. bp is borderline. Trop peaked at 21. Renal function is stable. Chest xray still shows pul edema and sig effusions. Hurshel Blow denies chest pain, dyspnea, palpitations, orthopnea, paroxysmal nocturnal dyspnea, exertional chest pressure/discomfort, claudication, lower extremity edema. Assessment/Plan:  Quite remarkable that she has come this far given her underlying cardiac and other conditions. Agree with 40mg iv lasix for now and hold beta blocker till bp better. Needs no further cardiac testing at this time. Spoke with son. Rocael Henriquez MD    Objective:     Visit Vitals  /63 (BP 1 Location: Left arm, BP Patient Position: At rest)   Pulse 90   Temp 98.6 °F (37 °C)   Resp 16   Ht 5' 5\" (1.651 m) Comment: Historic   Wt 136 lb 11 oz (62 kg)   SpO2 95%   BMI 22.75 kg/m²        Physical Exam:  Neck: supple, symmetrical, trachea midline, no adenopathy, no carotid bruit and no JVD  Heart: regular rate and rhythm, S1, S2 normal, no S3 or S4, systolic murmur: early systolic 1/6, crescendo at 2nd right intercostal space  Lungs: clear to auscultation bilaterally anteriorly, shes too weak to sit up. Abdomen: soft, non-tender. Bowel sounds normal. No masses,  no organomegaly  Extremities: edema tr      Labs:    Recent Labs     12/28/19 0418 12/27/19  0230   CPK  --   --  79   CKMB  --   --  4.2*   TROIQ 15.80*   < > 8.10*    < > = values in this interval not displayed. Recent Labs     12/28/19 0418   *   K 3.7      BUN 42*   CREA 1.41*   *   PHOS 2.1*   CA 7.5*     Recent Labs     12/28/19 0418   WBC 18.3*   HGB 8.4*   HCT 25.5*   *     No results for input(s): CHOL, LDLC in the last 72 hours.     No lab exists for component: TGL, HDLC,  HBA1C    Telemetry: normal sinus rhythm     Current Facility-Administered Medications   Medication Dose Route Frequency    glycerin (adult) suppository 1 Suppository  1 Suppository Rectal Q12H PRN    polyethylene glycol (MIRALAX) packet 17 g  17 g Oral DAILY    senna (SENOKOT) tablet 17.2 mg  2 Tab Oral DAILY    oxyCODONE IR (ROXICODONE) tablet 2.5 mg  2.5 mg Oral Q4H PRN    insulin glargine (LANTUS) injection 8 Units  8 Units SubCUTAneous QHS    furosemide (LASIX) injection 40 mg  40 mg IntraVENous DAILY    cefepime (MAXIPIME) 2 g in 0.9% sodium chloride (MBP/ADV) 100 mL  2 g IntraVENous Q24H    simethicone (MYLICON) tablet 80 mg  80 mg Oral QID PRN    sodium chloride (NS) flush 5-10 mL  5-10 mL IntraVENous PRN    sodium chloride (NS) flush 5-40 mL  5-40 mL IntraVENous Q8H    sodium chloride (NS) flush 5-40 mL  5-40 mL IntraVENous PRN    clopidogrel (PLAVIX) tablet 75 mg  75 mg Oral DAILY    aspirin chewable tablet 81 mg  81 mg Oral DAILY    levothyroxine (SYNTHROID) tablet 88 mcg  88 mcg Oral 6am    glucose chewable tablet 16 g  4 Tab Oral PRN    glucagon (GLUCAGEN) injection 1 mg  1 mg IntraMUSCular PRN    dextrose 10% infusion 0-250 mL  0-250 mL IntraVENous PRN    insulin lispro (HUMALOG) injection   SubCUTAneous AC&HS       Data Review: current meds, labs,recent radiology, intake/output/weight and problem list reviewed

## 2019-12-28 NOTE — PROGRESS NOTES
Hematology-Oncology Progress Note    Liudmila Gonzáles  1935  [id number]  12/28/2019    Follow-up for: met.  Breast cancer     [x]        Chart notes since last visit reviewed   [x]        Medications reviewed for allergies and interactions       Case discussed with the following:         []                            []        Nursing Staff                                                                         [x]        Pathologist                                                                        [x]        FAMILY      Subjective:     Spoke with patient who complains of: feels very tired, less SOB, is out of the icu, not able to get out of bed as yet      Objective:     Patient Vitals for the past 24 hrs:   BP Temp Pulse Resp SpO2 Weight   12/28/19 0747 100/60 98.2 °F (36.8 °C) 82 16 97 %    12/28/19 0415 99/54 98 °F (36.7 °C) 86 16 95 % 62 kg (136 lb 11 oz)   12/27/19 2251 108/68 98.6 °F (37 °C) 86 16 98 %    12/27/19 2017 94/56 97.9 °F (36.6 °C) 88 20 97 %    12/27/19 2005 97/51 98.1 °F (36.7 °C) 88 22 96 %    12/27/19 1900 103/53  83 23 100 %    12/27/19 1800 103/62  86 18 100 %    12/27/19 1700 102/53  84 17 100 %    12/27/19 1600 99/46 97.9 °F (36.6 °C) 83 18 100 %    12/27/19 1500 93/48  84 18 100 % 61.8 kg (136 lb 3.9 oz)   12/27/19 1400 100/49  87 21 100 %    12/27/19 1300 106/65  88 20 100 %    12/27/19 1229     98 %    12/27/19 1200 96/50 97.9 °F (36.6 °C) 88 18 98 %    12/27/19 1100 111/50  86 19 100 %    12/27/19 1045 106/65            REVIEW OF SYSTEMS:    Constitutional: negative fever, negative chills, negative weight loss  Eyes:   negative visual changes  ENT:   negative sore throat, tongue or lip swelling  Respiratory:  negative cough, negative dyspnea  Cards:  negative for chest pain, palpitations, lower extremity edema  GI:   negative for nausea, vomiting, diarrhea, and abdominal pain  Neuro:  negative for headaches, dizziness, vertigo  [] Full ROS o/w normal/non contributor    Constitutional:  Patient looks  [x]        Sick  [x]        Frail  []        Better                                                 []        Depressed    HEENT:  [x]   NC                         []   AT               []    ALOPECIA     High flow oxygen in place      Eyes: [x]   Normal               []    Icteric  Oropharynx: []    Normal                  []  Thrush               []   Dry  Mucositis: []    None                 Grade: []        I  []        II  []        III  []        IV  Neck:   [x]   Supple                  []  Rigid                 [x]        Normal  []        Confused      Available labs reviewed:  Labs:    Recent Results (from the past 24 hour(s))   GLUCOSE, POC    Collection Time: 12/27/19 12:20 PM   Result Value Ref Range    Glucose (POC) 233 (H) 65 - 100 mg/dL    Performed by Tricia Chung    TROPONIN I    Collection Time: 12/27/19  6:05 PM   Result Value Ref Range    Troponin-I, Qt. 21.10 (H) <0.05 ng/mL   GLUCOSE, POC    Collection Time: 12/27/19  6:09 PM   Result Value Ref Range    Glucose (POC) 217 (H) 65 - 100 mg/dL    Performed by Ender Saldana [id number] [facility]    GLUCOSE, POC    Collection Time: 12/27/19 10:54 PM   Result Value Ref Range    Glucose (POC) 242 (H) 65 - 100 mg/dL    Performed by Izzy Gill    CBC WITH AUTOMATED DIFF    Collection Time: 12/28/19  4:18 AM   Result Value Ref Range    WBC 18.3 (H) 3.6 - 11.0 K/uL    RBC 2.64 (L) 3.80 - 5.20 M/uL    HGB 8.4 (L) 11.5 - 16.0 g/dL    HCT 25.5 (L) 35.0 - 47.0 %    MCV 96.6 80.0 - 99.0 FL    MCH 31.8 26.0 - 34.0 PG    MCHC 32.9 30.0 - 36.5 g/dL    RDW 18.6 (H) 11.5 - 14.5 %    PLATELET 794 (L) 335 - 400 K/uL    MPV 11.8 8.9 - 12.9 FL    NRBC 0.3 (H) 0  WBC    ABSOLUTE NRBC 0.06 (H) 0.00 - 0.01 K/uL    NEUTROPHILS 91 (H) 32 - 75 %    BAND NEUTROPHILS 1 0 - 6 %    LYMPHOCYTES 5 (L) 12 - 49 %    MONOCYTES 3 (L) 5 - 13 %    EOSINOPHILS 0 0 - 7 %    BASOPHILS 0 0 - 1 %    IMMATURE GRANULOCYTES 0 %    ABS. NEUTROPHILS 16.9 (H) 1.8 - 8.0 K/UL    ABS. LYMPHOCYTES 0.9 0.8 - 3.5 K/UL    ABS. MONOCYTES 0.5 0.0 - 1.0 K/UL    ABS. EOSINOPHILS 0.0 0.0 - 0.4 K/UL    ABS. BASOPHILS 0.0 0.0 - 0.1 K/UL    ABS. IMM. GRANS. 0.0 K/UL    DF MANUAL      RBC COMMENTS ANISOCYTOSIS  1+        RBC COMMENTS POLYCHROMASIA  1+        RBC COMMENTS OVALOCYTES  PRESENT        WBC COMMENTS TOXIC GRANULATION     METABOLIC PANEL, BASIC    Collection Time: 12/28/19  4:18 AM   Result Value Ref Range    Sodium 134 (L) 136 - 145 mmol/L    Potassium 3.7 3.5 - 5.1 mmol/L    Chloride 105 97 - 108 mmol/L    CO2 24 21 - 32 mmol/L    Anion gap 5 5 - 15 mmol/L    Glucose 188 (H) 65 - 100 mg/dL    BUN 42 (H) 6 - 20 MG/DL    Creatinine 1.41 (H) 0.55 - 1.02 MG/DL    BUN/Creatinine ratio 30 (H) 12 - 20      GFR est AA 43 (L) >60 ml/min/1.73m2    GFR est non-AA 36 (L) >60 ml/min/1.73m2    Calcium 7.5 (L) 8.5 - 10.1 MG/DL   MAGNESIUM    Collection Time: 12/28/19  4:18 AM   Result Value Ref Range    Magnesium 2.2 1.6 - 2.4 mg/dL   PHOSPHORUS    Collection Time: 12/28/19  4:18 AM   Result Value Ref Range    Phosphorus 2.1 (L) 2.6 - 4.7 MG/DL   TROPONIN I    Collection Time: 12/28/19  4:18 AM   Result Value Ref Range    Troponin-I, Qt. 15.80 (H) <0.05 ng/mL   GLUCOSE, POC    Collection Time: 12/28/19  8:04 AM   Result Value Ref Range    Glucose (POC) 175 (H) 65 - 100 mg/dL    Performed by Melinda Boyd reviewed:    Chemotherapy monitored and toxicities assessed:    Assessment and Plan   1. Met. Breast cancer. .. pt. Has had nice response to chemo (abraxane/xeloda) with recent documented decrease in hepatic and axillary involvement. .. her last rx was 12/16 (cycle 5 ) she received 7 days of xeloda ending on 12/22. Stacey Scott than neulasta on 12/23. .. she is \"due\" next week this will be held    2. Leukocytosis secondary to neulasta and sepsis. Stacey Scott improving    3. Anemia. Stacey Scott secondary to chemotherapy/ckd,, hgb 9.5 today    4.  Gram negative sepsis 4/4 bttls +. Vinnie Sarabia ID - E. Coli - pan sensitive. ....continue abx    5. Chest pain / sob. .. NSTEMI - Medical Management, EF - low but unchanged from June 2019    Appreciate everyone's help with her care. Am available tomorrow if needed, Dr Carolina Elmore to follow on Monday    D/w patient    .

## 2019-12-28 NOTE — PROGRESS NOTES
1900: report called to Shaw Mckeon RN. Pt eating will transfer when patient is complete. 1915: tech here to complete lower extremity dopplers. 2005: pt transferred to [facility] with tech and monitor.  Pt on room air with transport

## 2019-12-28 NOTE — PROGRESS NOTES
20:27  Notified Dr. Gustavo Escalera, cardiologist of elevated troponin, 21.10. No new orders received at this time. Per Dr. Gustavo Escalera, pt is not a candidate for any type of intervention, so even if next troponin is higher than this, there is no need to call /notify MD.  Pt currently denies any chest pain,sob. Skin warm and dry. Anatoly Nelson RN

## 2019-12-28 NOTE — PROGRESS NOTES
Physical Therapy:  Orders received. Chart reviewed. Troponin levels remain elevated, however, starting to trend down as documented earlier this AM at 15.8. Will hold physical therapy evaluation until troponin levels decrease to within normal range. Will continue to follow. 10:15:  Spoke to RN. Requests to follow up for PT evaluation tomorrow. Will continue to follow.     Cary Davey, PT

## 2019-12-28 NOTE — PROGRESS NOTES
Hospitalist Progress Note  Lay Gordon MD  Answering service: [phone] from in house phone      Date of Service:  2019  NAME:  Oliva Mon  :  1935  MRN:  [id number]    Admission Summary:   84F a/w septic shock and sob. Transferred down from ICU  Patient is a 82 y. o. female admitted to ICU on  with a picture of sepsis with pressors. She is undergoing chemotherapy for metastatic breast cancer and developed n/v and shaking chills the DOA. BP improved and off pressor. Blood culture + for GNR. Interval history / Subjective:   Patient seen and examined at bedside, feels better, alert, son present. No acute events overnight, constipated, would like suppository. R wrist swelling, wants her rheumatologist to evaluate. Assessment & Plan:     1. Septic shock due to UTI with GNR bacteremia:   - Off pressor . ID on board. IV abx cefepime. BCs E.coli, Urine cultures E.coli, repeat BCs pending    #. NSTEMI: Cardiology following: medical management with asa and plavix  #. Acute on chronic systolic CHF: NYHA 4.Ef 58-84%. #. Acute hypoxic respiratory failure due to CHF exacerbation:  - Was on high flow and much improved. Will continue lasix 40mg daily, monitor lytes. - restart BBs once more stable. #. CKD3: monitor renal function, avoid nephrotoxics  #. HypoNatremia: mild, monitor  #. HypoPhosphatemia: replace, monitor  #. Constipation: laxatives, Suppositories prn  5. Met. Breast cancer: followed by Dr. Yuliya Hendrickson. 6. Chronic Anemia: monitor HB, transfuse prn if Hb< 8 due to her NSTEMI   7. Left calf pain: - Duplex -ve for DVTs. 8. DM: SSI   #. RA: known to Dr Silvana Javed, has R wrist swelling/discomfort.  Will consult her Rheumatologist on her request    Code status: Full  DVT prophylaxis: SCD  Care Plan discussed with: Patient/Family and Nurse  Disposition: TBD     Hospital Problems  Date Reviewed: 2019          Codes Class Noted POA    Septic shock ([facility] [address] ICD-10-CM: A41.9, R65.21  ICD-9-CM: 038.9, 785.52, 995.92  12/25/2019 Unknown            Review of Systems:   Pertinent items are mentioned in interval history. Vital Signs:    Last 24hrs VS reviewed since prior progress note. Most recent are:  Visit Vitals  /60 (BP 1 Location: Left arm, BP Patient Position: At rest)   Pulse 82   Temp 98.2 °F (36.8 °C)   Resp 16   Ht 5' 5\" (1.651 m)   Wt 62 kg (136 lb 11 oz)   SpO2 97%   BMI 22.75 kg/m²         Intake/Output Summary (Last 24 hours) at 12/28/2019 0805  Last data filed at 12/28/2019 0135  Gross per 24 hour   Intake 100 ml   Output 550 ml   Net -450 ml        Physical Examination:   General:  Alert, oriented, No acute distress, lost hair with chemo. Card:  S1, S2 without murmurs, good peripheral perfusion  Resp:  No accessory muscle use, Good AE, no wheezes, fine Creps lower zones  Abd:  Soft, non-tender, mild-distended, BS+  Extremities:  No cyanosis or clubbing, no significant edema  Neuro:  Grossly normal, no focal neuro deficits, follows commands   Psych:  Good insight, AAOx3, not agitated.     Data Review:    Review and/or order of clinical lab test  Review and/or order of tests in the radiology section of CPT  Review and/or order of tests in the medicine section of CPT  Labs:     Recent Labs     12/28/19 0418 12/27/19  0230   WBC 18.3* 26.4*   HGB 8.4* 9.5*   HCT 25.5* 29.3*   * 116*     Recent Labs     12/28/19  0418 12/27/19  0715 12/27/19  0230 12/26/19  0440   *  --  135* 136   K 3.7  --  3.9 3.8     --  106 106   CO2 24  --  22 22   BUN 42*  --  36* 37*   CREA 1.41*  --  1.32* 1.44*   *  --  250* 250*   CA 7.5*  --  7.4* 7.3*   MG 2.2 2.2  --  1.5*   PHOS 2.1*  --  2.1* 3.2     Recent Labs     12/27/19  0230 12/25/19  1307   SGOT 38* 83*   ALT 45 67   * 290*   TBILI 0.5 1.1*   TP 6.4 6.8   ALB 2.8* 3.1*   GLOB 3.6 3.7     Recent Labs     12/27/19  0403   APTT 33.4*      Recent Labs     12/27/19  0230   FERR 619*      No results found for: FOL, RBCF   No results for input(s): PH, PCO2, PO2 in the last 72 hours.   Recent Labs     12/28/19  0418 12/27/19  1805 12/27/19  0715 12/27/19  0230   CPK  --   --   --  79   CKNDX  --   --   --  5.3*   TROIQ 15.80* 21.10* 10.60* 8.10*     Lab Results   Component Value Date/Time    Cholesterol, total 74 04/16/2018 04:21 AM    HDL Cholesterol 25 04/16/2018 04:21 AM    LDL, calculated 31.6 04/16/2018 04:21 AM    Triglyceride 87 04/16/2018 04:21 AM    CHOL/HDL Ratio 3.0 04/16/2018 04:21 AM     Lab Results   Component Value Date/Time    Glucose (POC) 242 (H) 12/27/2019 10:54 PM    Glucose (POC) 217 (H) 12/27/2019 06:09 PM    Glucose (POC) 233 (H) 12/27/2019 12:20 PM    Glucose (POC) 257 (H) 12/27/2019 07:14 AM    Glucose (POC) 188 (H) 12/26/2019 09:28 PM     Lab Results   Component Value Date/Time    Color YELLOW/STRAW 12/25/2019 01:38 PM    Appearance CLOUDY (A) 12/25/2019 01:38 PM    Specific gravity 1.013 12/25/2019 01:38 PM    pH (UA) 5.5 12/25/2019 01:38 PM    Protein 100 (A) 12/25/2019 01:38 PM    Glucose NEGATIVE  12/25/2019 01:38 PM    Ketone NEGATIVE  12/25/2019 01:38 PM    Bilirubin NEGATIVE  12/25/2019 01:38 PM    Urobilinogen 0.2 12/25/2019 01:38 PM    Nitrites POSITIVE (A) 12/25/2019 01:38 PM    Leukocyte Esterase MODERATE (A) 12/25/2019 01:38 PM    Epithelial cells FEW 12/25/2019 01:38 PM    Bacteria 4+ (A) 12/25/2019 01:38 PM    WBC >100 (H) 12/25/2019 01:38 PM    RBC 0-5 12/25/2019 01:38 PM     Medications Reviewed:     Current Facility-Administered Medications   Medication Dose Route Frequency    polyethylene glycol (MIRALAX) packet 17 g  17 g Oral DAILY    senna (SENOKOT) tablet 17.2 mg  2 Tab Oral DAILY    oxyCODONE IR (ROXICODONE) tablet 2.5 mg  2.5 mg Oral Q4H PRN    insulin glargine (LANTUS) injection 8 Units  8 Units SubCUTAneous QHS    furosemide (LASIX) injection 40 mg  40 mg IntraVENous DAILY    cefepime (MAXIPIME) 2 g in 0.9% sodium chloride (MBP/ADV) 100 mL  2 g IntraVENous Q24H    simethicone (MYLICON) tablet 80 mg  80 mg Oral QID PRN    sodium chloride (NS) flush 5-10 mL  5-10 mL IntraVENous PRN    sodium chloride (NS) flush 5-40 mL  5-40 mL IntraVENous Q8H    sodium chloride (NS) flush 5-40 mL  5-40 mL IntraVENous PRN    clopidogrel (PLAVIX) tablet 75 mg  75 mg Oral DAILY    aspirin chewable tablet 81 mg  81 mg Oral DAILY    levothyroxine (SYNTHROID) tablet 88 mcg  88 mcg Oral 6am    glucose chewable tablet 16 g  4 Tab Oral PRN    glucagon (GLUCAGEN) injection 1 mg  1 mg IntraMUSCular PRN    dextrose 10% infusion 0-250 mL  0-250 mL IntraVENous PRN    insulin lispro (HUMALOG) injection   SubCUTAneous AC&HS   ______________________________________________________________________  EXPECTED LENGTH OF STAY: - - -  ACTUAL LENGTH OF STAY:          3               Gretchen Laguerre MD

## 2019-12-29 NOTE — PROGRESS NOTES
Hospitalist Progress Note  Maria Ines Aviles MD  Answering service: [phone] from in house phone      Date of Service:  2019  NAME:  Brandee Deras  :  1935  MRN:  [id number]    Admission Summary:   84F a/w septic shock and sob. Transferred down from ICU  Patient is a 82 y. o. female admitted to ICU on  with a picture of sepsis with pressors. She is undergoing chemotherapy for metastatic breast cancer and developed n/v and shaking chills the DOA. BP improved and off pressor. Blood culture + for GNR. Interval history / Subjective:   Patient seen and examined at bedside, feels improving slowly, still weak, couldn't work with therapy due to having high trops still, will recheck today. BP improving, maybe restart BB tomorrow if remains good     Assessment & Plan:     #. UTI with GNR bacteremia:   #. Septic Shock: 2nd to above- resolved. - Off pressor . ID following. IV abx cefepime. BCs E.coli, Urine cultures E.coli, repeat BCs pending  - PT/OT     #. NSTEMI: Cardiology following: medical Tx, no procedures planned. #. Acute on chronic systolic CHF: NYHA 4. Ef 05-22%. #. Acute hypoxic respiratory failure due to CHF exacerbation: - resolved. - off HFNC. continue lasix 40mg daily, monitor lytes- restart BBs once more stable. #. CKD3: monitor renal function, avoid nephrotoxics  #. HypoNatremia: mild, monitor  #. HypoPhosphatemia: replace, monitor  #. Constipation: laxatives, Suppositories prn  5. Met. Breast cancer: followed by Dr. Halina Ham. S/p recent Chemo- hold chemo till recovers  6. Chronic Anemia: monitor HB, transfuse prn if Hb< 8 due to her NSTEMI   7. Left calf pain: - Duplex -ve for DVTs. 8. DM: SSI   #. RA: known to Dr Aceves Or, has R wrist swelling/discomfort. Consult placed.     Code status: Full  DVT prophylaxis: SCD  Care Plan discussed with: Patient/Family and Nurse  Disposition: TBD     Hospital Problems  Date Reviewed: 11/13/2019          Codes Class Noted POA    Septic shock ([facility] [address] ICD-10-CM: A41.9, R65.21  ICD-9-CM: 038.9, 785.52, 995.92  12/25/2019 Unknown            Review of Systems:   Pertinent items are mentioned in interval history. Vital Signs:    Last 24hrs VS reviewed since prior progress note. Most recent are:  Visit Vitals  /68 (BP 1 Location: Right arm, BP Patient Position: At rest)   Pulse 91   Temp 98 °F (36.7 °C)   Resp 16   Ht 5' 5\" (1.651 m)   Wt 66.4 kg (146 lb 6.2 oz)   SpO2 97%   BMI 24.36 kg/m²       No intake or output data in the 24 hours ending 12/29/19 0726     Physical Examination:   General:  Alert, oriented, No acute distress, lost hair with chemo. Card:  S1, S2 without murmurs, good peripheral perfusion  Resp:  No accessory muscle use, Good AE, no wheezes, fine Creps lower zones  Abd:  Soft, non-tender, mild-distended  Extremities:  No cyanosis or clubbing, no significant edema  Neuro:  Grossly normal, no focal neuro deficits, follows commands, discolored fingers, r wrist swelling/mild tender. Psych:  Good insight, AAOx3, not agitated.     Data Review:    Review and/or order of clinical lab test  Review and/or order of tests in the radiology section of CPT  Review and/or order of tests in the medicine section of CPT  Labs:     Recent Labs     12/29/19  0245 12/28/19 0418   WBC 19.1* 18.3*   HGB 8.5* 8.4*   HCT 26.0* 25.5*   * 126*     Recent Labs     12/29/19  0245 12/28/19  0418 12/27/19  0715 12/27/19  0230    134*  --  135*   K 4.4 3.7  --  3.9    105  --  106   CO2 24 24  --  22   BUN 40* 42*  --  36*   CREA 1.36* 1.41*  --  1.32*   * 188*  --  250*   CA 7.5* 7.5*  --  7.4*   MG 2.1 2.2 2.2  --    PHOS 1.9* 2.1*  --  2.1*     Recent Labs     12/27/19  0230   SGOT 38*   ALT 45   *   TBILI 0.5   TP 6.4   ALB 2.8*   GLOB 3.6     Recent Labs     12/27/19  0403   APTT 33.4*      Recent Labs     12/27/19  0230   FERR 619*      No results found for: FOL, RBCF   No results for input(s): PH, PCO2, PO2 in the last 72 hours.   Recent Labs     12/28/19  0418 12/27/19  1805 12/27/19  0715 12/27/19  0230   CPK  --   --   --  79   CKNDX  --   --   --  5.3*   TROIQ 15.80* 21.10* 10.60* 8.10*     Lab Results   Component Value Date/Time    Cholesterol, total 74 04/16/2018 04:21 AM    HDL Cholesterol 25 04/16/2018 04:21 AM    LDL, calculated 31.6 04/16/2018 04:21 AM    Triglyceride 87 04/16/2018 04:21 AM    CHOL/HDL Ratio 3.0 04/16/2018 04:21 AM     Lab Results   Component Value Date/Time    Glucose (POC) 160 (H) 12/29/2019 06:27 AM    Glucose (POC) 269 (H) 12/28/2019 10:00 PM    Glucose (POC) 244 (H) 12/28/2019 04:27 PM    Glucose (POC) 175 (H) 12/28/2019 08:04 AM    Glucose (POC) 242 (H) 12/27/2019 10:54 PM     Lab Results   Component Value Date/Time    Color YELLOW/STRAW 12/25/2019 01:38 PM    Appearance CLOUDY (A) 12/25/2019 01:38 PM    Specific gravity 1.013 12/25/2019 01:38 PM    pH (UA) 5.5 12/25/2019 01:38 PM    Protein 100 (A) 12/25/2019 01:38 PM    Glucose NEGATIVE  12/25/2019 01:38 PM    Ketone NEGATIVE  12/25/2019 01:38 PM    Bilirubin NEGATIVE  12/25/2019 01:38 PM    Urobilinogen 0.2 12/25/2019 01:38 PM    Nitrites POSITIVE (A) 12/25/2019 01:38 PM    Leukocyte Esterase MODERATE (A) 12/25/2019 01:38 PM    Epithelial cells FEW 12/25/2019 01:38 PM    Bacteria 4+ (A) 12/25/2019 01:38 PM    WBC >100 (H) 12/25/2019 01:38 PM    RBC 0-5 12/25/2019 01:38 PM     Medications Reviewed:     Current Facility-Administered Medications   Medication Dose Route Frequency    glycerin (adult) suppository 1 Suppository  1 Suppository Rectal Q12H PRN    evolocumab (REPATHA SURECLICK) pen injection 380 mg (Patient Supplied)  1 mL SubCUTAneous Q 14 DAYS    magic mouthwash cpd (without sucralfate)  5 mL Oral TIDAC    Patient supplied Magic Mouthwash  (Patient Supplied)  5 mL Oral QID PRN    polyethylene glycol (MIRALAX) packet 17 g  17 g Oral DAILY    senna (SENOKOT) tablet 17.2 mg  2 Tab Oral DAILY    oxyCODONE IR (ROXICODONE) tablet 2.5 mg  2.5 mg Oral Q4H PRN    insulin glargine (LANTUS) injection 8 Units  8 Units SubCUTAneous QHS    furosemide (LASIX) injection 40 mg  40 mg IntraVENous DAILY    cefepime (MAXIPIME) 2 g in 0.9% sodium chloride (MBP/ADV) 100 mL  2 g IntraVENous Q24H    simethicone (MYLICON) tablet 80 mg  80 mg Oral QID PRN    sodium chloride (NS) flush 5-10 mL  5-10 mL IntraVENous PRN    sodium chloride (NS) flush 5-40 mL  5-40 mL IntraVENous Q8H    sodium chloride (NS) flush 5-40 mL  5-40 mL IntraVENous PRN    clopidogrel (PLAVIX) tablet 75 mg  75 mg Oral DAILY    aspirin chewable tablet 81 mg  81 mg Oral DAILY    levothyroxine (SYNTHROID) tablet 88 mcg  88 mcg Oral 6am    glucose chewable tablet 16 g  4 Tab Oral PRN    glucagon (GLUCAGEN) injection 1 mg  1 mg IntraMUSCular PRN    dextrose 10% infusion 0-250 mL  0-250 mL IntraVENous PRN    insulin lispro (HUMALOG) injection   SubCUTAneous AC&HS   ______________________________________________________________________  EXPECTED LENGTH OF STAY: - - -  ACTUAL LENGTH OF STAY:          4               Viola Castañeda MD

## 2019-12-29 NOTE — PROGRESS NOTES
Problem: Mobility Impaired (Adult and Pediatric)  Goal: *Acute Goals and Plan of Care (Insert Text)  Description  FUNCTIONAL STATUS PRIOR TO ADMISSION: Patient was modified independent using a rollator for functional mobility. HOME SUPPORT PRIOR TO ADMISSION: The patient lived with family prior to admission and was never home alone. Physical Therapy Goals  Initiated 12/29/2019  1. Patient will move from supine to sit and sit to supine  and roll side to side in bed with supervision/set-up within 7 day(s). 2.  Patient will transfer from bed to chair and chair to bed with minimal assistance/contact guard assist using the least restrictive device within 7 day(s). 3.  Patient will perform sit to stand with minimal assistance/contact guard assist within 7 day(s). 4.  Patient will ambulate with minimal assistance/contact guard assist for 50 feet with the least restrictive device within 7 day(s). 5.  Patient will ascend/descend 5 stairs with 1 handrail(s) with minimal assistance/contact guard assist within 7 day(s). Outcome: Progressing Towards Goal   PHYSICAL THERAPY EVALUATION  Patient: Velma Peck (82 y.o. female)  Date: 12/29/2019  Primary Diagnosis: [facility] [A41.9, R65.21]        Precautions:   Fall      ASSESSMENT  Based on the objective data described below, the patient presents with decreased mobility and endurance compared to baseline after being admitted with septic shock 12/25. She has history of metastatic breast CA, DM, HTN, HF and CVA. She has been living with her family and ambulatory at household distances with a rollator. At this time, she is quite deconditioned, needing min to mod assist for transfers to the chair and back. Although her VS remained stable throughout activity, she reported some lightheadedness once in the chair and opted to return to bed after session. She did report some calf pain bilaterally with activity, but no warmth or erythema noted. RN notified. Recommend she be up to the [facility] at least during the day with nursing if she is able to tolerate it, in order to increase her overall endurance since she has been using the purewick. We will continue to progress her activity as she is able to tolerate towards eventual D/C home. Current Level of Function Impacting Discharge (mobility/balance): mod assist of 1 with the RW for transfers    Functional Outcome Measure: The patient scored Total: 45/100 on the Barthel Index which is indicative of significantly impaired ability to care for basic self needs/dependency on others. Other factors to consider for discharge: family support at home     Patient will benefit from skilled therapy intervention to address the above noted impairments. PLAN :  Recommendations and Planned Interventions: bed mobility training, transfer training, gait training, therapeutic exercises, patient and family training/education and therapeutic activities      Frequency/Duration: Patient will be followed by physical therapy:  5 times a week to address goals. Recommendation for discharge: (in order for the patient to meet his/her long term goals)  Physical therapy at least 2 days/week in the home AND ensure assist and/or supervision for safety with mobility    This discharge recommendation:  Has not yet been discussed the attending provider and/or case management    IF patient discharges home will need the following DME: patient owns DME required for discharge         SUBJECTIVE:   Patient stated I am so weak. I didn't think I would feel this weak.     OBJECTIVE DATA SUMMARY:   HISTORY:    Past Medical History:   Diagnosis Date    Acute on chronic systolic HF (heart failure) ([facility] 5/19/2018    Age-related osteoporosis without current pathological fracture 9/2/2009    Anemia 9/2/2009    Asymptomatic hyperuricemia 2/16/2017    Breast cancer ([facility]     CAD (coronary artery disease) 6/21/2018    Carotid bruit 8/31/2011    CHF (congestive heart failure) (HCC)     CKD (chronic kidney disease) stage 3, GFR 30-59 ml/min (HCC) 10/25/2017    Colon cancer ([facility] 9/2/2009    surgery/chemo    Colon cancer ([facility] 9/2/2009    DM (diabetes mellitus) ([facility] 9/2/2009    GERD (gastroesophageal reflux disease)     H/O: CVA 9/2/2009    slight l sided weakness    Heart attack ([address]     HTN, goal below 140/90 9/2/2009    Hypothyroid 9/2/2009    Ill-defined condition     seasonal allergies    Long-term use of immunosuppressant medication 11/21/2016    Metastatic breast cancer ([facility] 6/1/2018    Microalbuminuria 9/2/2009    Osteoporosis 9/2/2009    Other and unspecified hyperlipidemia 1/27/2010    PAD (peripheral artery disease) ([facility] 9/7/2011    Primary osteoarthritis of both knees 9/28/2016    Rheumatoid arthritis involving ankle ([facility] 9/28/2016    Rheumatoid arthritis(714.0) 9/2/2009    Seropositive rheumatoid arthritis of multiple sites ([facility] 9/28/2016    Statin intolerance 12/21/2016    Type 2 diabetes mellitus with neurologic complication, with long-term current use of insulin ([facility] 9/2/2009    Type 2 diabetes with nephropathy ([facility] 8/20/2018    Unspecified essential hypertension 9/2/2009    Vitamin D deficiency 6/16/2017    Weakness due to cerebrovascular accident      Past Surgical History:   Procedure Laterality Date    HX COLECTOMY      HX HYSTERECTOMY      HX OTHER SURGICAL      colonoscopies numerous since 1993       Personal factors and/or comorbidities impacting plan of care: metastatic breast CA, HTN, HF, DM, CVA    Home Situation  Home Environment: Private residence  One/Two Story Residence: Two story  Living Alone: No  Support Systems: Child(madi), [employer] / uz[employer] community  Patient Expects to be Discharged to[de-identified] Private residence  Current DME Used/Available at Home: CPAP, Walker, rollator, Shower chair    EXAMINATION/PRESENTATION/DECISION MAKING:   Critical Behavior:  Neurologic State: Alert Hearing: Auditory  Auditory Impairment: Hearing aid(s), Hard of hearing, bilateral  Hearing Aids/Status: With patient  Skin:  Grossly intact, per nursing  Edema: none  Range Of Motion:  AROM: Within functional limits                       Strength:    Strength: (mild weakness LUE and LE from old CVA)                    Tone & Sensation:   Tone: Normal              Sensation: Impaired(decreased in hands and feet)               Coordination:  Coordination: Generally decreased, functional(decreased moreso on the L due to weakness)  Vision:      Functional Mobility:  Bed Mobility:     Supine to Sit: Moderate assistance; Additional time  Sit to Supine: Moderate assistance; Additional time     Transfers:  Sit to Stand: Adaptive equipment; Additional time; Moderate assistance  Stand to Sit: Minimum assistance; Adaptive equipment; Additional time        Bed to Chair: Moderate assistance; Adaptive equipment; Additional time              Balance:   Sitting: Intact; Without support  Standing: Impaired; With support  Standing - Static: Fair;Constant support  Standing - Dynamic : Poor;Constant support  Ambulation/Gait Training:  Distance (ft): 4 Feet (ft)  Assistive Device: Gait belt;Walker, rolling  Ambulation - Level of Assistance: Moderate assistance; Adaptive equipment; Additional time        Gait Abnormalities: Decreased step clearance; Path deviations; Step to gait; Shuffling gait(L foot drop and poor control of L knee in stance)        Base of Support: Widened     Speed/Haylee: Pace decreased (<100 feet/min); Shuffled  Step Length: Left shortened;Right shortened  Swing Pattern: Left asymmetrical     Interventions: Safety awareness training; Tactile cues; Verbal cues            Stairs:               Therapeutic Exercises:       Functional Measure:  Barthel Index:    Bathin  Bladder: 5  Bowels: 10  Groomin  Dressin  Feeding: 10  Mobility: 0  Stairs: 0  Toilet Use: 5  Transfer (Bed to Chair and Back): 5  Total: 45/100       The Barthel ADL Index: Guidelines  1. The index should be used as a record of what a patient does, not as a record of what a patient could do. 2. The main aim is to establish degree of independence from any help, physical or verbal, however minor and for whatever reason. 3. The need for supervision renders the patient not independent. 4. A patient's performance should be established using the best available evidence. Asking the patient, friends/relatives and nurses are the usual sources, but direct observation and common sense are also important. However direct testing is not needed. 5. Usually the patient's performance over the preceding 24-48 hours is important, but occasionally longer periods will be relevant. 6. Middle categories imply that the patient supplies over 50 per cent of the effort. 7. Use of aids to be independent is allowed. Naima Turner., Barthel, D.W. (0441). Functional evaluation: the Barthel Index. [address] [facility] (14)2. TIAGO Cervantes, Chuck Rangel., [facility] [place], [address] [place] [address] (1999). Measuring the change indisability after inpatient rehabilitation; comparison of the responsiveness of the Barthel Index and Functional Blackford Measure. Journal of Neurology, Neurosurgery, and Psychiatry, 66(4), 635-193. Germán Wade, N.J.A, ANDREA Ramos, & Lucina Lou, M.A. (2004.) Assessment of post-stroke quality of life in cost-effectiveness studies: The usefulness of the Barthel Index and the EuroQoL-5D.  Quality of Life Research, 15, 976-15        Physical Therapy Evaluation Charge Determination   History Examination Presentation Decision-Making   HIGH Complexity :3+ comorbidities / personal factors will impact the outcome/ POC  MEDIUM Complexity : 3 Standardized tests and measures addressing body structure, function, activity limitation and / or participation in recreation  MEDIUM Complexity : Evolving with changing characteristics  LOW Complexity : FOTO score of  Based on the above components, the patient evaluation is determined to be of the following complexity level: LOW     Pain Rating:  Some complaints of soreness in the calves    Activity Tolerance:   Fair and requires rest breaks  Please refer to the flowsheet for vital signs taken during this treatment. After treatment patient left in no apparent distress:   Supine in bed, Call bell within reach, Caregiver / family present and Side rails x 3    COMMUNICATION/EDUCATION:   The patients plan of care was discussed with: Registered Nurse. Fall prevention education was provided and the patient/caregiver indicated understanding., Patient/family have participated as able in goal setting and plan of care. and Patient/family agree to work toward stated goals and plan of care.     Thank you for this referral.  Edda Lees, PT   Time Calculation: 31 mins

## 2019-12-29 NOTE — PROGRESS NOTES
Bedside and Verbal shift change report given to [facility] by Cristian Bragg. Report included the following information SBAR, Kardex, ED Summary, Procedure Summary, MAR, Recent Results, Med Rec Status and Cardiac Rhythm NSR.

## 2019-12-29 NOTE — PROGRESS NOTES
12/29/2019     Admit Date: 12/25/2019    Admit Diagnosis: Septic shock ([facility] [A41.9, R65.21]    Active Problems:    [facility] ([facility] (12/25/2019)        Subjective:  Still comfortable on room air. Sat up in chair yesterday, and was able to lean forward with assist for exam today. Good urine output and bp is better, creat stable. Franco Ouch denies chest pain, dyspnea, palpitations, orthopnea, paroxysmal nocturnal dyspnea, lower extremity edema. Assessment/Plan:  Slowly improving, though still long way to go to get back to baseline. Would continue current lasix dose and will resume coreg at lower than outpt dose. Spoke with son by phone   Johnny Kiser MD    Objective:     Visit Vitals  /68 (BP 1 Location: Right arm, BP Patient Position: At rest)   Pulse 91   Temp 98 °F (36.7 °C)   Resp 16   Ht 5' 5\" (1.651 m) Comment: Historic   Wt 146 lb 6.2 oz (66.4 kg)   SpO2 97%   BMI 24.36 kg/m²        Physical Exam:  Neck: supple, symmetrical, trachea midline, no adenopathy, no carotid bruit and no JVD  Heart: regular rate and rhythm, S1, S2 normal, no S3 or S4, systolic murmur: early systolic 2/6, crescendo at 2nd right intercostal space  Lungs: decreased and crackles 1/3 way up bilaterally  Abdomen: soft, non-tender. Bowel sounds normal. No masses,  no organomegaly  Extremities: edema tr      Labs:    Recent Labs     12/29/19 0245 12/27/19  0230   CPK  --   --  79   CKMB  --   --  4.2*   TROIQ 10.90*   < > 8.10*    < > = values in this interval not displayed. Recent Labs     12/29/19 0245      K 4.4      BUN 40*   CREA 1.36*   *   PHOS 1.9*   CA 7.5*     Recent Labs     12/29/19 0245   WBC 19.1*   HGB 8.5*   HCT 26.0*   *     No results for input(s): CHOL, LDLC in the last 72 hours.     No lab exists for component: TGL, HDLC,  HBA1C    Telemetry: normal sinus rhythm     Current Facility-Administered Medications   Medication Dose Route Frequency    cefTRIAXone (ROCEPHIN) 1 g in 0.9% sodium chloride (MBP/ADV) 50 mL  1 g IntraVENous Q24H    carvedilol (COREG) tablet 3.125 mg  3.125 mg Oral BID WITH MEALS    glycerin (adult) suppository 1 Suppository  1 Suppository Rectal Q12H PRN    evolocumab (REPATHA SURECLICK) pen injection 954 mg (Patient Supplied)  1 mL SubCUTAneous Q 14 DAYS    magic mouthwash cpd (without sucralfate)  5 mL Oral TIDAC    Patient supplied Magic Mouthwash  (Patient Supplied)  5 mL Oral QID PRN    polyethylene glycol (MIRALAX) packet 17 g  17 g Oral DAILY    senna (SENOKOT) tablet 17.2 mg  2 Tab Oral DAILY    oxyCODONE IR (ROXICODONE) tablet 2.5 mg  2.5 mg Oral Q4H PRN    insulin glargine (LANTUS) injection 8 Units  8 Units SubCUTAneous QHS    furosemide (LASIX) injection 40 mg  40 mg IntraVENous DAILY    simethicone (MYLICON) tablet 80 mg  80 mg Oral QID PRN    sodium chloride (NS) flush 5-10 mL  5-10 mL IntraVENous PRN    sodium chloride (NS) flush 5-40 mL  5-40 mL IntraVENous Q8H    sodium chloride (NS) flush 5-40 mL  5-40 mL IntraVENous PRN    clopidogrel (PLAVIX) tablet 75 mg  75 mg Oral DAILY    aspirin chewable tablet 81 mg  81 mg Oral DAILY    levothyroxine (SYNTHROID) tablet 88 mcg  88 mcg Oral 6am    glucose chewable tablet 16 g  4 Tab Oral PRN    glucagon (GLUCAGEN) injection 1 mg  1 mg IntraMUSCular PRN    dextrose 10% infusion 0-250 mL  0-250 mL IntraVENous PRN    insulin lispro (HUMALOG) injection   SubCUTAneous AC&HS       Data Review: current meds, labs,recent radiology, intake/output/weight and problem list reviewed

## 2019-12-29 NOTE — PROGRESS NOTES
Problem: Self Care Deficits Care Plan (Adult)  Goal: *Acute Goals and Plan of Care (Insert Text)  Description    FUNCTIONAL STATUS PRIOR TO ADMISSION: Patient required up to maximal assistance for basic ADLs and total A I for instrumental ADLs. HOME SUPPORT: The patient lived with family and has a caregiver who comes 8-630 everyday to assist with needs. Patient has all needed DME. Occupational Therapy Goals  Initiated 12/29/2019  1. Patient will perform self-feeding with supervision/set-up within 7 day(s). 2.  Patient will perform upper body dressing with supervision/set-up within 7 day(s). 3.  Patient will perform lower body dressing with minimal assistance/contact guard assist within 7 day(s). 4.  Patient will perform toilet transfers with supervision/set-up within 7 day(s). 5.  Patient will perform all aspects of toileting with minimal assistance/contact guard assist within 7 day(s). 6.  Patient will participate in upper extremity therapeutic exercise/activities with independence for 5 minutes within 7 day(s). 7.  Patient will utilize energy conservation techniques during functional activities with verbal cues within 7 day(s). Outcome: Not Met    OCCUPATIONAL THERAPY EVALUATION  Patient: Juan Martinez (82 y.o. female)  Date: 12/29/2019  Primary Diagnosis: Septic shock ([facility] [A41.9, R65.21]        Precautions:  Fall    ASSESSMENT  Based on the objective data described below, the patient presents with near baseline ADLs s/p admission for septic shock. Patient ADLs limited by impaired balance, generalized weakness and decreased functional activity tolerance. Patient has hx of CVA with residual L sided weakness/impaired coordination. At baseline patient has a caregiver who comes to assist from 8am-630 PM with ADLs and IADLs. Today, patient able to complete bed mobility and functional mobility to/from bathroom with RW with up to min A.  Patient required up to mod A for toileting 2* mildly self limiting behavior, needed encouragement to attempt tasks on her own. Patient able to stand at sink for handwashing with CGA and was returned to chair at end of session with call bell in reach, RN aware and family bedside. Will continue to follow. Encouraged patient and family to have patient use bathroom during day rather than [place] in order to maximize IND and maintain good hygiene. Current Level of Function Impacting Discharge (ADLs/self-care): up to max A at baseline for bathing    Functional Outcome Measure: The patient scored 45/100 on the Barthel Index outcome measure which is indicative of moderate deficits in ADLs. Other factors to consider for discharge: none, good support at home     Patient will benefit from skilled therapy intervention to address the above noted impairments. PLAN :  Recommendations and Planned Interventions: self care training, functional mobility training, therapeutic exercise, balance training, therapeutic activities, endurance activities, patient education, home safety training, and family training/education    Frequency/Duration: Patient will be followed by occupational therapy 3 times a week to address goals. Recommendation for discharge: (in order for the patient to meet his/her long term goals)  Occupational therapy at least 2 days/week in the home     This discharge recommendation:  Has not yet been discussed the attending provider and/or case management    IF patient discharges home will need the following DME: patient owns DME required for discharge       SUBJECTIVE:   Patient stated I don't think I can do that myself.  re: bladder hygiene    OBJECTIVE DATA SUMMARY:   HISTORY:   Past Medical History:   Diagnosis Date    Acute on chronic systolic HF (heart failure) ([facility] 5/19/2018    Age-related osteoporosis without current pathological fracture 9/2/2009    Anemia 9/2/2009    Asymptomatic hyperuricemia 2/16/2017    Breast cancer ([facility]     CAD (coronary artery disease) 6/21/2018    Carotid bruit 8/31/2011    CHF (congestive heart failure) ([facility])     CKD (chronic kidney disease) stage 3, GFR 30-59 ml/min ([facility]) 10/25/2017    Colon cancer ([facility] 9/2/2009    surgery/chemo    Colon cancer ([facility] 9/2/2009    DM (diabetes mellitus) ([address] 9/2/2009    GERD (gastroesophageal reflux disease)     H/O: CVA 9/2/2009    slight l sided weakness    Heart attack ([address]     HTN, goal below 140/90 9/2/2009    Hypothyroid 9/2/2009    Ill-defined condition     seasonal allergies    Long-term use of immunosuppressant medication 11/21/2016    Metastatic breast cancer ([address] 6/1/2018    Microalbuminuria 9/2/2009    Osteoporosis 9/2/2009    Other and unspecified hyperlipidemia 1/27/2010    PAD (peripheral artery disease) ([facility] 9/7/2011    Primary osteoarthritis of both knees 9/28/2016    Rheumatoid arthritis involving ankle ([facility] 9/28/2016    Rheumatoid arthritis(714.0) 9/2/2009    Seropositive rheumatoid arthritis of multiple sites ([facility] 9/28/2016    Statin intolerance 12/21/2016    Type 2 diabetes mellitus with neurologic complication, with long-term current use of insulin ([address] 9/2/2009    Type 2 diabetes with nephropathy ([facility] 8/20/2018    Unspecified essential hypertension 9/2/2009    Vitamin D deficiency 6/16/2017    Weakness due to cerebrovascular accident      Past Surgical History:   Procedure Laterality Date    HX COLECTOMY      HX HYSTERECTOMY      HX OTHER SURGICAL      colonoscopies numerous since 1993       Expanded or extensive additional review of patient history:     Home Situation  Home Environment: Private residence  # Steps to Enter: 5  Rails to Enter: Yes  Hand Rails : Right  One/Two Story Residence: Two story, live on 1st floor  Living Alone: No  Support Systems: Child(madi)  Patient Expects to be Discharged to[de-identified] Private residence  Current DME Used/Available at Home: Shower chair, Grab bars, Raised toilet seat, Walker, rollator, Safety frame [employer] or [employer] Type: Shower    Hand dominance: Right    EXAMINATION OF PERFORMANCE DEFICITS:  Cognitive/Behavioral Status:  Neurologic State: Alert  Orientation Level: Oriented X4  Cognition: Appropriate for age attention/concentration; Follows commands  Perception: Appears intact  Perseveration: No perseveration noted  Safety/Judgement: Awareness of environment; Fall prevention    Skin: Appears grossly intact    Edema: none noted in BUEs    Hearing: Auditory  Auditory Impairment: Hearing aid(s), Hard of hearing, bilateral  Hearing Aids/Status: With patient    Vision/Perceptual:    Tracking: Able to track stimulus in all quadrants w/o difficulty    Diplopia: No    Acuity: Impaired near vision; Impaired far vision      Range of Motion:  In BUEs  AROM: Generally decreased, functional    Strength: In BUEs  Strength: Generally decreased, functional(LUE mildly impaired 2* hx CVA with residual effect)    Coordination:  Coordination: Generally decreased, functional  Fine Motor Skills-Upper: Left Impaired;Right Intact    Gross Motor Skills-Upper: Left Intact; Right Intact    Tone & Sensation:  IN BUEs  Tone: Normal  Sensation: Impaired(decreased in B hands and feet)    Balance:  Sitting: Intact  Standing: Impaired; With support  Standing - Static: Constant support; Fair  Standing - Dynamic : Constant support; Fair    Functional Mobility and Transfers for ADLs:  Bed Mobility:  Supine to Sit: Minimum assistance; Additional time;Assist x1  Sit to Supine: Moderate assistance; Additional time  Scooting: Contact guard assistance; Additional time    Transfers:  Sit to Stand: Minimum assistance; Adaptive equipment; Additional time;Assist x1  Stand to Sit: Minimum assistance; Adaptive equipment; Additional time;Assist x1  Bed to Chair: Moderate assistance; Adaptive equipment; Additional time  Toilet Transfer : Minimum assistance; Adaptive equipment; Additional time;Assist x1  Assistive Device : Gait Belt;Walker, rolling    ADL Assessment:  Feeding: Minimum assistance(infer min A to open containers )    Oral Facial Hygiene/Grooming: Minimum assistance(infer min A to open containers )    Bathing: Maximum assistance(at baseline - caregiver bathes patient)    Upper Body Dressing: Minimum assistance(Infer per obs of func reach, balance, strength)    Lower Body Dressing: Moderate assistance(Infer per obs of func reach, balance, strength)    Toileting: Moderate assistance(for hygiene and clothing management - mildly self limiting)    ADL Intervention and task modifications:    Grooming  Position Performed: Standing  Washing Hands: Contact guard assistance  Cues: Verbal cues provided    Toileting  Toileting Assistance: Moderate assistance  Bladder Hygiene: Maximum assistance  Clothing Management: Minimum assistance  Cues: Physical assistance for pants up; Tactile cues provided;Verbal cues provided  Adaptive Equipment: Elevated seat;Grab bars; Walker    Cognitive Retraining  Safety/Judgement: Awareness of environment; Fall prevention    Functional Measure:  Barthel Index:    Bathin  Bladder: 5  Bowels: 10  Groomin  Dressin  Feedin  Mobility: 0  Stairs: 0  Toilet Use: 5  Transfer (Bed to Chair and Back): 10  Total: 45/100        The Barthel ADL Index: Guidelines  1. The index should be used as a record of what a patient does, not as a record of what a patient could do. 2. The main aim is to establish degree of independence from any help, physical or verbal, however minor and for whatever reason. 3. The need for supervision renders the patient not independent. 4. A patient's performance should be established using the best available evidence. Asking the patient, friends/relatives and nurses are the usual sources, but direct observation and common sense are also important. However direct testing is not needed. 5. Usually the patient's performance over the preceding 24-48 hours is important, but occasionally longer periods will be relevant.   6. Middle categories imply that the patient supplies over 50 per cent of the effort. 7. Use of aids to be independent is allowed. Maurice Granados., Barthel, DJaimeW. (9931). Functional evaluation: the Barthel Index. [address] (14)2. Emmalene Oxford sandra [place], J.J.M.F, Juany Greenfield., Michelle Motley., [place], [address] (1999). Measuring the change indisability after inpatient rehabilitation; comparison of the responsiveness of the Barthel Index and Functional [place] Measure. Journal of Neurology, Neurosurgery, and Psychiatry, 66(4), 342-587. MYRA Medel, ANDREA Ramos, & Leslie Ramos M.A. (2004.) Assessment of post-stroke quality of life in cost-effectiveness studies: The usefulness of the Barthel Index and the EuroQoL-5D. Quality of Life Research, 15, 611-80       Occupational Therapy Evaluation Charge Determination   History Examination Decision-Making   LOW Complexity : Brief history review  MEDIUM Complexity : 3-5 performance deficits relating to physical, cognitive , or psychosocial skils that result in activity limitations and / or participation restrictions MEDIUM Complexity : Patient may present with comorbidities that affect occupational performnce. Miniml to moderate modification of tasks or assistance (eg, physical or verbal ) with assesment(s) is necessary to enable patient to complete evaluation       Based on the above components, the patient evaluation is determined to be of the following complexity level: LOW   Pain Rating:  none    Activity Tolerance:   Fair and requires rest breaks  Please refer to the flowsheet for vital signs taken during this treatment. After treatment patient left in no apparent distress:    Sitting in chair, Call bell within reach, Caregiver / family present, and RN aware     COMMUNICATION/EDUCATION:   The patients plan of care was discussed with: Physical Therapist and Registered Nurse. Home safety education was provided and the patient/caregiver indicated understanding.  and Patient/family have participated as able in goal setting and plan of care. This patients plan of care is appropriate for delegation to ESTHER.     Thank you for this referral.  Kailee Suh OT  Time Calculation: 26 mins

## 2019-12-29 NOTE — PROGRESS NOTES
Orders received, chart reviewed and patient evaluated by occupational therapy. Pending progression with skilled acute occupational therapy, recommend:  Occupational therapy at least 2 days/week in the home AND ensure assist and/or supervision for safety with mobility     Recommend with nursing patient to complete as able in order to maintain strength, endurance and independence: OOB to chair 3x/day with 1 assist and functional mobility to the bathroom with 1 assist. Thank you for your assistance. Full evaluation to follow.

## 2019-12-30 NOTE — PROGRESS NOTES
ID Progress Note  2019    Subjective:     She is not feeling all that great--poor appetite, but out of the ICU    Objective:     Antibiotics:  1. Cefepime       Vitals:   Visit Vitals  /65 (BP 1 Location: Right arm, BP Patient Position: At rest)   Pulse 88   Temp 98.3 °F (36.8 °C)   Resp 18   Ht 5' 5\" (1.651 m) Comment: Historic   Wt 59.9 kg (132 lb 0.9 oz)   SpO2 97%   BMI 21.98 kg/m²        Tmax:  Temp (24hrs), Av.2 °F (36.8 °C), Min:98 °F (36.7 °C), Max:98.4 °F (36.9 °C)      Exam:  Lungs:  clear to auscultation bilaterally  Heart:  regular rate and rhythm  Abdomen:  soft, non-tender. Bowel sounds normal. No masses,  no organomegaly  Skin:  no rash or abnormalities  Port is benign    Labs:      Recent Labs     19  0353 19  0245 19  0418   WBC 15.0* 19.1* 18.3*   HGB 8.5* 8.5* 8.4*    148* 126*   BUN 37* 40* 42*   CREA 1.34* 1.36* 1.41*       Cultures:     No results found for: SDES  Lab Results   Component Value Date/Time    Culture result: NO GROWTH 2 DAYS 2019 04:18 AM    Culture result: ESCHERICHIA COLI (A) 2019 01:38 PM    Culture result: (A) 2019 01:07 PM     ESCHERICHIA COLI GROWING IN ALL 4 BOTTLES DRAWN (SITE=RAC AND L PORT)    Culture result: (A) 2019 01:07 PM     PRELIMINARY REPORT OF GRAM NEGATIVE RODS GROWING IN 3 OF 4 BOTTLES DRAWN CALLED TO AND READ BACK BY  Altagracia Cox RN ON 19 AT 4780.        Radiology:     Line/Insert Date:           Assessment:     1. Bacteremia--likely urinary source  2. Breast cancer, metastatic  3. Leukocytosis--improved  4. Fevers--resolved    Objective:     1. Continue current therapy  2. Follow up cultures and adjust antibiotics as needed  3.  She will need a total of 2 weeks IV antibiotic therapy    Beverly Merchant MD

## 2019-12-30 NOTE — PROGRESS NOTES
Bedside shift change report given to [address] (oncoming nurse) by Magan Garrido (offgoing nurse). Report included the following information SBAR, Kardex, Procedure Summary, Intake/Output, Recent Results and Med Rec Status.

## 2019-12-30 NOTE — PROGRESS NOTES
12/30/2019     Admit Date: 12/25/2019    Admit Diagnosis: Septic shock ([facility] [A41.9, R65.21]    Active Problems:    Septic shock ([facility] (12/25/2019)        Subjective:  Still comfortable on room air. Sat up in chair yesterday, and was able to lean forward with assist for exam today. Good urine output and bp is stable after starting coreg yesterday, creat stable. Ky Caballero denies chest pain, dyspnea, palpitations, orthopnea, paroxysmal nocturnal dyspnea, lower extremity edema. Assessment/Plan:  Family is concerned about her function in home environment when daytime care not available. Needs 6 min walk. If discharged would continue current coreg dose and give 80mg po lasix for now, will need bmp at least once a week. Should see us within one week of discharge from hosp or sniff. Further coreg adjustment as outpt. Brigid Sanchez MD    Objective:     Visit Vitals  /65 (BP 1 Location: Right arm, BP Patient Position: At rest)   Pulse 88   Temp 98.3 °F (36.8 °C)   Resp 18   Ht 5' 5\" (1.651 m) Comment: Historic   Wt 132 lb 0.9 oz (59.9 kg)   SpO2 97%   BMI 21.98 kg/m²        Physical Exam:  Neck: supple, symmetrical, trachea midline, no adenopathy, no carotid bruit and no JVD  Heart: regular rate and rhythm, S1, S2 normal, no S3 or S4, systolic murmur: early systolic 2/6, crescendo at 2nd right intercostal space  Lungs: decreased and crackles 1/3 way up bilaterally, somewhat better than yesterday  Abdomen: soft, non-tender. Bowel sounds normal. No masses,  no organomegaly  Extremities: edema tr      Labs:    Recent Labs     12/29/19  0245   TROIQ 10.90*     Recent Labs     12/30/19  0353      K 3.7      BUN 37*   CREA 1.34*   *   PHOS 2.0*   CA 8.3*     Recent Labs     12/30/19  0353   WBC 15.0*   HGB 8.5*   HCT 26.3*        No results for input(s): CHOL, LDLC in the last 72 hours.     No lab exists for component: TGL, HDLC,  HBA1C    Telemetry: normal sinus rhythm     Current Facility-Administered Medications   Medication Dose Route Frequency    alteplase (CATHFLO) 1 mg in sterile water (preservative free) 1 mL injection  1 mg InterCATHeter PRN    bacitracin 500 unit/gram packet 1 Packet  1 Packet Topical PRN    heparin (porcine) pf 500 Units  500 Units InterCATHeter PRN    acetaminophen (TYLENOL) tablet 1,000 mg  1,000 mg Oral Q6H PRN    cefTRIAXone (ROCEPHIN) 1 g in 0.9% sodium chloride (MBP/ADV) 50 mL  1 g IntraVENous Q24H    carvedilol (COREG) tablet 3.125 mg  3.125 mg Oral BID WITH MEALS    glycerin (adult) suppository 1 Suppository  1 Suppository Rectal Q12H PRN    evolocumab (REPATHA SURECLICK) pen injection 640 mg (Patient Supplied)  1 mL SubCUTAneous Q 14 DAYS    magic mouthwash cpd (without sucralfate)  5 mL Oral TIDAC    Patient supplied Magic Mouthwash  (Patient Supplied)  5 mL Oral QID PRN    polyethylene glycol (MIRALAX) packet 17 g  17 g Oral DAILY    senna (SENOKOT) tablet 17.2 mg  2 Tab Oral DAILY    oxyCODONE IR (ROXICODONE) tablet 2.5 mg  2.5 mg Oral Q4H PRN    insulin glargine (LANTUS) injection 8 Units  8 Units SubCUTAneous QHS    furosemide (LASIX) injection 40 mg  40 mg IntraVENous DAILY    simethicone (MYLICON) tablet 80 mg  80 mg Oral QID PRN    sodium chloride (NS) flush 5-10 mL  5-10 mL IntraVENous PRN    sodium chloride (NS) flush 5-40 mL  5-40 mL IntraVENous Q8H    sodium chloride (NS) flush 5-40 mL  5-40 mL IntraVENous PRN    clopidogrel (PLAVIX) tablet 75 mg  75 mg Oral DAILY    aspirin chewable tablet 81 mg  81 mg Oral DAILY    levothyroxine (SYNTHROID) tablet 88 mcg  88 mcg Oral 6am    glucose chewable tablet 16 g  4 Tab Oral PRN    glucagon (GLUCAGEN) injection 1 mg  1 mg IntraMUSCular PRN    dextrose 10% infusion 0-250 mL  0-250 mL IntraVENous PRN    insulin lispro (HUMALOG) injection   SubCUTAneous AC&HS       Data Review: current meds, labs,recent radiology, intake/output/weight and problem list reviewed

## 2019-12-30 NOTE — DIABETES MGMT
[facility]    [facility] Progress Note    Recommendations/ Comments: Chart review for hyperglycemia -  mg/dL - 180 mg/dL; daytime BG's > 200 mg/dL  Received 9 untis of lispro correction in past 24 hours   Elevated creatinine noted  PO intake not documented    If appropriate, please consider   Add lispro 2 units AC TID    Current hospital DM medication:   Lantus 8 units BID  Lispro normal sensitivity correction scale    Chart reviewed on Sandy Ledesma. Patient is a 82 y.o. female with known DM on Tresiba 14 units daily and Novolog sliding scale AC TID  Metastatic cancer; receiving chemo  Consult for assessment of home management received. Transferred out of ICU; remains very weak. Will see when appropriate    A1c:   Lab Results   Component Value Date/Time    Hemoglobin A1c 6.7 (H) 12/26/2019 04:40 AM    Hemoglobin A1c 8.1 09/26/2018    Hemoglobin A1c 8.0 (H) 07/02/2018 09:40 AM       Recent Glucose Results:   Lab Results   Component Value Date/Time     (H) 12/30/2019 03:53 AM    GLUCPOC 225 (H) 12/30/2019 12:58 PM    GLUCPOC 182 (H) 12/30/2019 06:38 AM    GLUCPOC 274 (H) 12/29/2019 09:39 PM        Lab Results   Component Value Date/Time    Creatinine 1.34 (H) 12/30/2019 03:53 AM     Estimated Creatinine Clearance: 29.1 mL/min (A) (based on SCr of 1.34 mg/dL (H)). Active Orders   Diet    DIET DIABETIC CONSISTENT CARB Regular        PO intake: No data found. Will continue to follow as needed.     Thank you  Fior Thomas RN, CDE

## 2019-12-30 NOTE — PROGRESS NOTES
Problem: Mobility Impaired (Adult and Pediatric)  Goal: *Acute Goals and Plan of Care (Insert Text)  Description  FUNCTIONAL STATUS PRIOR TO ADMISSION: Patient was modified independent using a rollator for functional mobility. HOME SUPPORT PRIOR TO ADMISSION: The patient lived with family prior to admission and was never home alone. Physical Therapy Goals  Initiated 12/29/2019  1. Patient will move from supine to sit and sit to supine  and roll side to side in bed with supervision/set-up within 7 day(s). 2.  Patient will transfer from bed to chair and chair to bed with minimal assistance/contact guard assist using the least restrictive device within 7 day(s). 3.  Patient will perform sit to stand with minimal assistance/contact guard assist within 7 day(s). 4.  Patient will ambulate with minimal assistance/contact guard assist for 50 feet with the least restrictive device within 7 day(s). 5.  Patient will ascend/descend 5 stairs with 1 handrail(s) with minimal assistance/contact guard assist within 7 day(s). Outcome: Progressing Towards Goal  PHYSICAL THERAPY TREATMENT  Patient: [facility] (12 y.o. female)  Date: 12/30/2019  Diagnosis: Septic shock ([facility] [A41.9, R65.21]   <principal problem not specified>       Precautions: Fall  Chart, physical therapy assessment, plan of care and goals were reviewed. ASSESSMENT  Patient continues with skilled PT services and is progressing towards goals. Pt continues with generalized weakness, decline in functional mobility, decreased endurance, impaired standing balance and high fall risk. Per son at bedside today - pt does have 8 hrs paid caregiver assist but not 24 hour assist - prior to recent hospitalization pt was safe and independent when home alone to ambulate to bathroom/living area. Current Level of Function Impacting Discharge (mobility/balance):  Mod assist supine to sit (flat bed), min assist for transfers and short distance amb with RW    Other factors to consider for discharge: High fall risk - requires assist x 1 at all times for functional mobility         PLAN :  Patient continues to benefit from skilled intervention to address the above impairments. Continue treatment per established plan of care. to address goals. Recommendation for discharge: (in order for the patient to meet his/her long term goals)  Therapy 3 hours per day 5-7 days per week    This discharge recommendation:  Has not yet been discussed the attending provider and/or case management    IF patient discharges home will need the following DME: bedside commode and bed rail       SUBJECTIVE:   Patient stated i'm just so weak --- how long will it take to get my strength back - I can't be home alone.     OBJECTIVE DATA SUMMARY:   Critical Behavior:  Neurologic State: Alert  Orientation Level: Oriented X4  Cognition: Appropriate decision making, Appropriate for age attention/concentration, Appropriate safety awareness  Safety/Judgement: Awareness of environment, Fall prevention  Functional Mobility Training:  Bed Mobility:     Supine to Sit: Moderate assistance;Assist x1(from flat surface - no use of rail)              Transfers:  Sit to Stand: Minimum assistance; Adaptive equipment; Additional time  Stand to Sit: Minimum assistance(verbal cues for safety)        Bed to Chair: Minimum assistance; Additional time; Adaptive equipment                    Balance:  Sitting: Intact; Without support  Standing: Impaired; With support  Standing - Static: Fair;Constant support  Standing - Dynamic : Fair;Constant support      Ambulation/Gait Training:  Distance (ft): 40 Feet (ft)  Assistive Device: Walker, rolling;Gait belt  Ambulation - Level of Assistance: Minimal assistance    Standing with RW - unable to stand without bilateral support - assisted pt to bathroom - required assist with brief and hygiene     Gait Abnormalities: Decreased step clearance; Step to gait; Shuffling gait(left foot drop and poor knee stability in stance)        Base of Support: Widened     Speed/Haylee: Slow  Step Length: Right shortened;Left shortened  Swing Pattern: Left asymmetrical     Interventions: Safety awareness training;Verbal cues     Pain Rating:  Pt c/o bilateral discomfort in lower legs - soreness. Activity Tolerance:   Fair - encourage up in chair for meals; amb to bathroom with staff assist  Please refer to the flowsheet for vital signs taken during this treatment.     After treatment patient left in no apparent distress:   Sitting in chair, Call bell within reach, and Caregiver / family present    COMMUNICATION/COLLABORATION:   The patients plan of care was discussed with: Registered Nurse    Marita Guillory, PT   Time Calculation: 36 mins

## 2019-12-30 NOTE — PROGRESS NOTES
Hematology-Oncology Progress Note    J Carlos Rodriguez  1935  [id number]  12/30/2019    Follow-up for: met.  Breast cancer     [x]        Chart notes since last visit reviewed   [x]        Medications reviewed for allergies and interactions       Case discussed with the following:         []                            []        Nursing Staff                                                                         []        Pathologist                                                                        [x]        FAMILY      Subjective:     Spoke with patient who complains of: stronger, less sob, no c/o pain,    Objective:     Patient Vitals for the past 24 hrs:   BP Temp Pulse Resp SpO2 Weight   12/30/19 0721 132/65 98.3 °F (36.8 °C) 88 18 97 %    12/30/19 0406      59.9 kg (132 lb 0.9 oz)   12/30/19 0405 146/65 98.4 °F (36.9 °C) 91 16 99 %    12/29/19 2259 124/66 98.1 °F (36.7 °C) 88 17 97 %    12/29/19 2027 114/68 98.3 °F (36.8 °C) 89 17 98 %    12/29/19 2016 129/70 98 °F (36.7 °C) 88 16 98 %    12/29/19 1513 137/69 98.5 °F (36.9 °C) 87 18 96 %    12/29/19 1157 147/72 98 °F (36.7 °C) 88 18 97 %    12/29/19 1138 137/65  98      12/29/19 1133 130/51  90      12/29/19 1128 156/52  91      12/29/19 1123 135/71  96      12/29/19 1117 123/70  85          REVIEW OF SYSTEMS:    Constitutional: negative fever, negative chills, negative weight loss  Eyes:   negative visual changes  ENT:   negative sore throat, tongue or lip swelling  Respiratory:  negative cough, negative dyspnea  Cards:  negative for chest pain, palpitations, lower extremity edema  GI:   negative for nausea, vomiting, diarrhea, and abdominal pain  Neuro:  negative for headaches, dizziness, vertigo  []                        Full ROS o/w normal/non contributor    Constitutional:  Patient looks  [x]        Sick  [x]        Frail  []        Better                                                 [] Depressed    HEENT:  [x]   NC                         []   AT               []    ALOPECIA     High flow oxygen in place      Eyes: [x]   Normal               []    Icteric  Oropharynx: []    Normal                  []  Thrush               []   Dry  Lungs. .. crackles in bases  Abd. Soft non tend  Mucositis: [x]    None         Neck:   [x]   Supple                  []  Rigid               Ext.  No cce  [x]        Normal  []        Confused      Available labs reviewed:  Labs:    Recent Results (from the past 24 hour(s))   GLUCOSE, POC    Collection Time: 12/29/19 11:39 AM   Result Value Ref Range    Glucose (POC) 295 (H) 65 - 100 mg/dL    Performed by [employer] Laufranck Fabien, POC    Collection Time: 12/29/19  4:22 PM   Result Value Ref Range    Glucose (POC) 254 (H) 65 - 100 mg/dL    Performed by Honey Cheatham    GLUCOSE, POC    Collection Time: 12/29/19  9:39 PM   Result Value Ref Range    Glucose (POC) 274 (H) 65 - 100 mg/dL    Performed by Jose Tucker    METABOLIC PANEL, BASIC    Collection Time: 12/30/19  3:53 AM   Result Value Ref Range    Sodium 137 136 - 145 mmol/L    Potassium 3.7 3.5 - 5.1 mmol/L    Chloride 104 97 - 108 mmol/L    CO2 24 21 - 32 mmol/L    Anion gap 9 5 - 15 mmol/L    Glucose 203 (H) 65 - 100 mg/dL    BUN 37 (H) 6 - 20 MG/DL    Creatinine 1.34 (H) 0.55 - 1.02 MG/DL    BUN/Creatinine ratio 28 (H) 12 - 20      GFR est AA 46 (L) >60 ml/min/1.73m2    GFR est non-AA 38 (L) >60 ml/min/1.73m2    Calcium 8.3 (L) 8.5 - 10.1 MG/DL   MAGNESIUM    Collection Time: 12/30/19  3:53 AM   Result Value Ref Range    Magnesium 2.1 1.6 - 2.4 mg/dL   PHOSPHORUS    Collection Time: 12/30/19  3:53 AM   Result Value Ref Range    Phosphorus 2.0 (L) 2.6 - 4.7 MG/DL   CBC WITH AUTOMATED DIFF    Collection Time: 12/30/19  3:53 AM   Result Value Ref Range    WBC 15.0 (H) 3.6 - 11.0 K/uL    RBC 2.68 (L) 3.80 - 5.20 M/uL    HGB 8.5 (L) 11.5 - 16.0 g/dL    HCT 26.3 (L) 35.0 - 47.0 %    MCV 98.1 80.0 - 99.0 FL    MCH 31.7 26.0 - 34.0 PG    MCHC 32.3 30.0 - 36.5 g/dL    RDW 18.7 (H) 11.5 - 14.5 %    PLATELET 830 761 - 344 K/uL    MPV 11.0 8.9 - 12.9 FL    NRBC 0.3 (H) 0  WBC    ABSOLUTE NRBC 0.04 (H) 0.00 - 0.01 K/uL    NEUTROPHILS 83 (H) 32 - 75 %    LYMPHOCYTES 5 (L) 12 - 49 %    MONOCYTES 9 5 - 13 %    EOSINOPHILS 1 0 - 7 %    BASOPHILS 1 0 - 1 %    IMMATURE GRANULOCYTES 2 (H) 0.0 - 0.5 %    ABS. NEUTROPHILS 12.5 (H) 1.8 - 8.0 K/UL    ABS. LYMPHOCYTES 0.8 0.8 - 3.5 K/UL    ABS. MONOCYTES 1.3 (H) 0.0 - 1.0 K/UL    ABS. EOSINOPHILS 0.1 0.0 - 0.4 K/UL    ABS. BASOPHILS 0.1 0.0 - 0.1 K/UL    ABS. IMM. GRANS. 0.3 (H) 0.00 - 0.04 K/UL    DF AUTOMATED     GLUCOSE, POC    Collection Time: 12/30/19  6:38 AM   Result Value Ref Range    Glucose (POC) 182 (H) 65 - 100 mg/dL    Performed by Peter Thapa        Available Xrays reviewed:    Chemotherapy monitored and toxicities assessed:    Assessment and Plan   1. Met. Breast cancer. .. pt. Has had nice response to chemo (abraxane/xeloda) with recent documented decrease in hepatic and axillary involvement. .. her last rx was 12/16 (cycle 5 ) she received 7 days of xeloda ending on 12/22. Adilson Triana than neulasta on 12/23. .. she is \"due\" next week this will be held    2. Leukocytosis secondary to neulasta and sepsis. Adilson Triana improving    3. Anemia. Adilson Triana secondary to chemotherapy/ckd,, hgb 8.5 today    4. Gram negative sepsis 4/4 bttls +. Adilson Triana ID - E. Coli - pan sensitive. ....continue abx    5. Chest pain / sob. .. NSTEMI - Medical Management, EF - low but unchanged from June 2019    Appreciate everyone's help with her care.       Vinicius Travis MD    .

## 2019-12-30 NOTE — PROGRESS NOTES
19 -   MOY:  - Patient has been cleared for discharge today,   - Patient's disposition is questionable: Home with HH vs Rehab placement    09:15 -   CM rounded on patient with bedside nursing and cardio. Patient has been a min-mod assist with transfers. Per nursing, family is concerned about overnight care due to patient's son travel schedule. Plan is for PT/OT to work with patient again prior to discharge to determine appropriate transitions of care. CM to follow for recommendations. CM will plan to meet with patient and family post therapy sessions. NOTE: patient has [employer] and will need auth for rehab placement. CRM: Daisha Manriquez, MPH, CHES; Z: [phone]    13:30 -   CM met with patient at bedside and conferenced with patient's son Kathrin Apgar: [phone]) to discuss rehab recommendation. Son confirmed that patient has care aids at home during the day, and patient lives with the son. Patient does not have additional support over night, and the son frequently travels over night for work. Patient and family have selected [facility]. As back up, family selected [facility] [employer]. Verbal FOC is on patient's chart. CM to submit referral via [employer]. The Plan for Transition of Care is related to the following treatment goals: Rehab placement prior to discharge to own home, likely with [place] and care aids    The Patient and/or patient representative son, Nasreen Forrest, was provided with a choice of provider and agrees   with the discharge plan. [x] Yes [] No    Freedom of choice list was provided with basic dialogue that supports the patient's individualized plan of care/goals, treatment preferences and shares the quality data associated with the providers.  [x] Yes [] No    CRM: Daisha Manriquez, MPH, CHES; Z: [phone]

## 2019-12-30 NOTE — DISCHARGE SUMMARY
Discharge Summary       PATIENT ID: Juan Martinez  MRN: [id number]   YOB: 1935    DATE OF ADMISSION: 12/25/2019 12:22 PM    DATE OF DISCHARGE: 12/30/2019   PRIMARY CARE PROVIDER: Hope Staton MD     ATTENDING PHYSICIAN: Domi Rea MD  DISCHARGING PROVIDER: Domi Rea MD    To contact this individual call [phone] and ask the  to page. If unavailable ask to be transferred the Adult Hospitalist Department. CONSULTATIONS: IP CONSULT TO INTENSIVIST  IP CONSULT TO INFECTIOUS DISEASES  IP CONSULT TO HEMATOLOGY  IP CONSULT TO RHEUMATOLOGY  IP CONSULT TO CARDIOLOGY    PROCEDURES/SURGERIES: * No surgery found *    ADMITTING [address] [place] [address] COURSE:   Admitted and Treated for UTI and Bacteremia with E.coli, followed by ID and treated with abx. Also had NSTEMI and evaluated by cardiology agreed on medical/conservative treatment. Needs home health on dc. Risk of Re-Admission: mod  DISCHARGE DIAGNOSES / PLAN:      #. UTI with GNR bacteremia:   #. Septic Shock: 2nd to above- resolved. - Off pressor 12/26. ID following. IV abx cefepime. BCs E.coli, Urine cultures E.coli, repeat BCs -ve - PT/OT recommended home health with supervision. ID following, Abx course to finish as outpatient.     #. NSTEMI: Cardiology following: medical Tx, no procedures planned. #. Acute on chronic systolic CHF: NYHA 4. Ef 92-80%. #. Acute hypoxic respiratory failure due to CHF exacerbation: - resolved. On room air  - off HFNC. continue lasix 40mg daily, monitor lytes- restart BBs once more stable.     #. CKD3: monitor renal function, avoid nephrotoxics  #. HypoNatremia: mild, monitor  #. HypoPhosphatemia: replace, monitor  #. Constipation: laxatives, Suppositories prn  5. Met. Breast cancer: followed by Dr. Lyudmila Jain. S/p recent Chemo- hold chemo till recovers  6. Chronic Anemia: monitor HB, transfuse prn if Hb< 8 due to her NSTEMI   7. Left calf pain: - Duplex -ve for DVTs.   8. DM: SSI, home regimen  #. RA: known to Dr Anita Alfredo, has R wrist swelling/discomfort. Consult placed, he called and advised will f/u op. FOLLOW UP APPOINTMENTS:    Follow-up Information     Follow up With Specialties Details Why Contact Info    Petrona Suresh MD [facility] In 1 week  [address]  [phone]        In 3 weeks  Cardiology    Beka Sanchez MD Rheumatology In 2 weeks  [address] [place] [address]  [phone]           ADDITIONAL CARE RECOMMENDATIONS:  Follow up with PCP and cardiology    DIET: Resume previous diet    ACTIVITY: Activity as tolerated    DISCHARGE MEDICATIONS:  Current Discharge Medication List      CONTINUE these medications which have CHANGED    Details   carvedilol (COREG) 3.125 mg tablet Take 1 Tab by mouth two (2) times daily (with meals) for 30 days. Qty: 60 Tab, Refills: 0         CONTINUE these medications which have NOT CHANGED    Details   capecitabine (XELODA) 500 mg tablet         PACLitaxel-Protein Bound (ABRAXANE) 100 mg chemo injection by IntraVENous route. furosemide (LASIX) 40 mg tablet TAKE ONE TABLET BY MOUTH EVERY OTHER DAY ALTERNATING WITH TAKE TWO TABLETS BY MOUTH EVERY OTHER DAY OR AS ADVISED BY PHYSICIAN  Qty: 45 Tab, Refills: 5    Associated Diagnoses: Essential hypertension; Acute systolic heart failure (HCC)      rituximab (RITUXAN IV) 1,000 mg by IntraVENous route every 6 months. clopidogrel (PLAVIX) 75 mg tab TAKE ONE TABLET BY MOUTH DAILY  Qty: 30 Tab, Refills: 11    Associated Diagnoses: Coronary artery disease involving native coronary artery, angina presence unspecified, unspecified whether native or transplanted heart      insulin aspart U-100 (NOVOLOG U-100 INSULIN ASPART) 100 unit/mL injection by SubCUTAneous route. Sliding scale   Indications: usually needs 2 to 3 units at dinner      insulin degludec (TRESIBA FLEXTOUCH U-100) 100 unit/mL (3 mL) inpn 14 Units by SubCUTAneous route daily. denosumab (XGEVA SC) by SubCUTAneous route every thirty (30) days. epoetin celio (PROCRIT) 10,000 unit/mL injection by SubCUTAneous route once. Unsure of dosage      REPLEYDA WESTFALLICK pen injection INJECT 1 ML SUBCUTANEOUS EVERY 14 DAYS  Qty: 2 Pen, Refills: 11    Associated Diagnoses: Mixed hyperlipidemia      acetaminophen (TYLENOL) 325 mg tablet Take 2 Tabs by mouth every four (4) hours as needed. Qty: 10 Tab, Refills: 0      b-complex with vitamin c tablet Take 1 Tab by mouth daily. Qty: 30 Tab, Refills: 0      polyethylene glycol (MIRALAX) 17 gram packet Take 1 Packet by mouth daily. Qty: 1 Each, Refills: 0      levothyroxine (SYNTHROID) 88 mcg tablet Take 88 mcg by mouth Daily (before breakfast). aspirin delayed-release 81 mg tablet Take 81 mg by mouth daily. OMEGA-3 FATTY ACIDS/FISH OIL (OMEGA 3 FISH OIL PO) Take 300 mg by mouth daily. CHOLECALCIFEROL, VITAMIN D3, (VITAMIN D-3 PO) Take 1,000 Units by mouth daily. nitroglycerin (NITROSTAT) 0.4 mg SL tablet 1 Tab by SubLINGual route as needed for Chest Pain. Up to 3 doses. Qty: 40 Tab, Refills: 0           NOTIFY YOUR PHYSICIAN FOR ANY OF THE FOLLOWING:   Fever over 101 degrees for 24 hours. Chest pain, shortness of breath, fever, chills, nausea, vomiting, diarrhea, change in mentation, falling, weakness, bleeding. Severe pain or pain not relieved by medications. Or, any other signs or symptoms that you may have questions about.     DISPOSITION:    Home With:   OT  PT  HH  RN       Long term SNF/Inpatient Rehab   x Independent/assisted living    Hospice    Other:     PATIENT CONDITION AT DISCHARGE:     Functional status    Poor     Deconditioned     Independent      Cognition     Lucid     Forgetful     Dementia      Catheters/lines (plus indication)    Alford     PICC     PEG     None      Code status     Full code     DNR      PHYSICAL EXAMINATION AT DISCHARGE:  Patient seen and examined at bedside, Condition stable, explained discharge and follow up plans. /65 (BP 1 Location: Right arm, BP Patient Position: At rest)   Pulse 88   Temp 98.3 °F (36.8 °C)   Resp 18   Ht 5' 5\" (1.651 m) Comment: Historic  Wt 59.9 kg (132 lb 0.9 oz)   SpO2 97%   BMI 21.98 kg/m²   General:  Alert, oriented, No acute distress, feels weak. Resp:  No accessory muscle use, Good AE.   Neuro:  Grossly normal, no focal neuro deficits, follows commands   CHRONIC MEDICAL DIAGNOSES:  Problem List as of 12/30/2019 Date Reviewed: 11/13/2019          Codes Class Noted - Resolved    Septic shock ([facility] ICD-10-CM: A41.9, R65.21  ICD-9-CM: 038.9, 785.52, 995.92  12/25/2019 - Present        Dizziness ICD-10-CM: R42  ICD-9-CM: 780.4  8/25/2019 - Present        Type 2 diabetes with nephropathy ([facility] ICD-10-CM: E11.21  ICD-9-CM: 250.40, 583.81  8/20/2018 - Present        CAD (coronary artery disease) ICD-10-CM: I25.10  ICD-9-CM: 414.00  6/21/2018 - Present        Metastatic breast cancer ([facility] ICD-10-CM: C50.919  ICD-9-CM: 174.9  6/1/2018 - Present        Acute on chronic systolic HF (heart failure) ([facility]) ICD-10-CM: C96.50  ICD-9-CM: 428.23  5/19/2018 - Present        CKD (chronic kidney disease) stage 3, GFR 30-59 ml/min ([facility]) ICD-10-CM: N18.3  ICD-9-CM: 585.3  10/25/2017 - Present        Vitamin D deficiency ICD-10-CM: E55.9  ICD-9-CM: 268.9  6/16/2017 - Present        Asymptomatic hyperuricemia ICD-10-CM: E79.0  ICD-9-CM: 790.6  2/16/2017 - Present        Statin intolerance ICD-10-CM: Z78.9  ICD-9-CM: 995.27  12/21/2016 - Present        Long-term use of immunosuppressant medication ICD-10-CM: Z79.899  ICD-9-CM: V58.69  11/21/2016 - Present        Seropositive rheumatoid arthritis of multiple sites [facility]) ICD-10-CM: M05.79  ICD-9-CM: 714.0  9/28/2016 - Present        Primary osteoarthritis of both knees ICD-10-CM: M17.0  ICD-9-CM: 715.16  9/28/2016 - Present        Weakness due to cerebrovascular accident ICD-10-CM: [id number]  ICD-9-CM: Yvette Parada  4/2/2012 - Present PAD (peripheral artery disease) (HCC) ICD-10-CM: I73.9  ICD-9-CM: 443.9  9/7/2011 - Present        Carotid bruit ICD-10-CM: R09.89  ICD-9-CM: 785.9  8/31/2011 - Present        Hyperlipidemia ICD-10-CM: E78.5  ICD-9-CM: 272.4  1/27/2010 - Present        Type 2 diabetes mellitus with neurologic complication, with long-term current use of insulin (HCC) ICD-10-CM: E11.49, Z79.4  ICD-9-CM: 250.60, V58.67  9/2/2009 - Present        Colon cancer ([facility] ICD-10-CM: C18.9  ICD-9-CM: 153.9  9/2/2009 - Present        Age-related osteoporosis without current pathological fracture ICD-10-CM: M81.0  ICD-9-CM: 733.01  9/2/2009 - Present        HTN, goal below 140/90 ICD-10-CM: I10  ICD-9-CM: 401.9  9/2/2009 - Present        Anemia ICD-10-CM: D64.9  ICD-9-CM: 285.9  9/2/2009 - Present        RESOLVED: Acute respiratory failure ([facility] ICD-10-CM: J96.00  ICD-9-CM: 518.81  6/21/2018 - 8/8/2019        RESOLVED: Elevated troponin ICD-10-CM: R79.89  ICD-9-CM: 790.6  6/21/2018 - 8/8/2019        RESOLVED: Acute renal failure superimposed on stage 3 chronic kidney disease ([facility] ICD-10-CM: N17.9, N18.3  ICD-9-CM: 584.9, 585.3  6/21/2018 - 2/18/2019        RESOLVED: Gout flare ICD-10-CM: M10.9  ICD-9-CM: 274.01  6/21/2018 - 8/8/2019        RESOLVED: Hyperglycemia due to type 2 diabetes mellitus ([facility] ICD-10-CM: E11.65  ICD-9-CM: 250.00  6/21/2018 - 8/8/2019        RESOLVED: Fluid overload ICD-10-CM: E87.70  ICD-9-CM: 276.69  6/1/2018 - 6/18/2018        RESOLVED: Goals of care, counseling/discussion ICD-10-CM: Z71.89  ICD-9-CM: V65.49  6/1/2018 - 8/8/2019        RESOLVED: Acute systolic heart failure ([facility] ICD-10-CM: I50.21  ICD-9-CM: 428.21  4/24/2018 - 8/8/2019        RESOLVED: Altered mental status, unspecified ICD-10-CM: R41.82  ICD-9-CM: 780.97  4/23/2018 - 8/8/2019        RESOLVED: SOB (shortness of breath) ICD-10-CM: R06.02  ICD-9-CM: 786.05  4/16/2018 - 7/4/2018        RESOLVED: Occlusion and stenosis of carotid artery without mention of cerebral infarction ICD-10-CM: I65.29  ICD-9-CM: 433.10  8/23/2013 - 8/8/2019        RESOLVED: Neuropathy in diabetes ([facility] ICD-10-CM: E11.40  ICD-9-CM: 250.60, 357.2  4/2/2012 - 8/8/2019        RESOLVED: Claudication ([facility] ICD-10-CM: I73.9  ICD-9-CM: 443.9  8/31/2011 - 8/8/2019        RESOLVED: Weakness of left arm ICD-10-CM: R29.898  ICD-9-CM: 729.89  8/31/2011 - 8/8/2019        RESOLVED: Hypothyroid ICD-10-CM: E03.9  ICD-9-CM: 244.9  9/2/2009 - 8/8/2019        RESOLVED: H/O: CVA ICD-9-CM: V12.54  9/2/2009 - 8/8/2019        RESOLVED: Microalbuminuria ICD-10-CM: R80.9  ICD-9-CM: 791.0  9/2/2009 - 8/8/2019              36 minutes were spent with the patient on counseling and coordination of care.     Signed:   Elizabeth Shell MD  12/30/2019  7:47 AM

## 2019-12-30 NOTE — PROGRESS NOTES
Problem: Self Care Deficits Care Plan (Adult)  Goal: *Acute Goals and Plan of Care (Insert Text)  Description    FUNCTIONAL STATUS PRIOR TO ADMISSION: Patient required up to maximal assistance for basic ADLs and total A I for instrumental ADLs. HOME SUPPORT: The patient lived with family and has a caregiver who comes [phone] everyday to assist with needs. Patient has all needed DME. Occupational Therapy Goals  Initiated 12/29/2019  1. Patient will perform self-feeding with supervision/set-up within 7 day(s). MET 12/30/19  2. Patient will perform upper body dressing with supervision/set-up within 7 day(s). 3.  Patient will perform lower body dressing with minimal assistance/contact guard assist within 7 day(s). 4.  Patient will perform toilet transfers with supervision/set-up within 7 day(s). 5.  Patient will perform all aspects of toileting with minimal assistance/contact guard assist within 7 day(s). 6.  Patient will participate in upper extremity therapeutic exercise/activities with independence for 5 minutes within 7 day(s). 7.  Patient will utilize energy conservation techniques during functional activities with verbal cues within 7 day(s). Outcome: Progressing Towards Goal   OCCUPATIONAL THERAPY TREATMENT  Patient: Zara Peñaloza (82 y.o. female)  Date: 12/30/2019  Diagnosis: [facility] [A41.9, R65.21]   <principal problem not specified>       Precautions: Fall  Chart, occupational therapy assessment, plan of care, and goals were reviewed. ASSESSMENT  Patient continues with skilled OT services and is progressing towards goals. 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  Patient a fall risk for functional mobility without assist. Son present for session    Current Level of Function Impacting Discharge (ADLs): UE self care with set up; LE self care with max assist, functional transfers with CGA to min assist.     Other factors to consider for discharge: alone at night when son out of town         PLAN :  Patient continues to benefit from skilled intervention to address the above impairments. Continue treatment per established plan of care. to address goals. Recommend with staff: Ke Zayas in chair for meals and self care, functional tranfers with RW, CGA to min assist, UE self care with set up, assist for LE self care     Recommend next OT session: standing endurance for grooming and balance for managing LE clothing    Recommendation for discharge: (in order for the patient to meet his/her long term goals)  Therapy 3 hours per day 5-7 days per week. If not able, SNF versus home with assist for all mobility. This discharge recommendation:  Has been made in collaboration with the attending provider and/or case management    IF patient discharges home will need the following DME: bedside commode       SUBJECTIVE:   Patient stated Alpa Reed had a stroke.     OBJECTIVE DATA SUMMARY:   Cognitive/Behavioral Status:  Neurologic State: Alert  Orientation Level: Oriented X4  Cognition: Follows commands; Appropriate for age attention/concentration  Perception: Appears intact  Perseveration: No perseveration noted  Safety/Judgement: Awareness of environment    Functional Mobility and Transfers for ADLs:  Bed Mobility:  Supine to Sit: Moderate assistance;Assist x1(from flat surface - no use of rail)  Scooting: Modified independent(to get hips back into chair)    Transfers:  Sit to Stand: Contact guard assistance;Assist x1  Functional Transfers  Toilet Transfer : Contact guard assistance;Assist x1  Adaptive Equipment: Grab bars; [place] (comment)(son reports he plans to purchase a BSC, but already has a shower seat, arms over toilet, a RW, and a rollator)  Bed to Chair: Minimum assistance; Additional time; Adaptive equipment    Balance:  Sitting: Intact; Without support  Standing: Impaired; With support  Standing - Static: Fair  Standing - Dynamic : Fair    ADL Intervention:  Feeding  Feeding Assistance: Set-up  Container Management: Maximum assistance  Cutting Food: Total assistance (dependent)  Food to Mouth: Modified independent    Grooming  Position Performed: Standing  Washing Hands: Contact guard assistance    Upper Body Bathing  Bathing Assistance: Set-up(in simulation)  Position Performed: Seated in chair    Lower Body Bathing  Bathing Assistance: Maximum assistance  Position Performed: Standing  Adaptive Equipment: Walker  Position Performed: Seated in chair  Adaptive Equipment: [address] [place]: Total assistance (dependent)  Leg Crossed Method Used: No  Position Performed: Bending forward method;Seated in chair  Adaptive Equipment Used: Walker    Toileting  Bladder Hygiene: Set-up  Bowel Hygiene: Maximum assistance(inferred from mobility)  Clothing Management: Maximum assistance(gown and brief)  Cues: Physical assistance for pants up;Physical assistance for pants down;Verbal cues provided  Adaptive Equipment: Grab bars; Walker    Cognitive Retraining  Organizing/Sequencing: Breaking task down  Following Commands: Follows one step commands/directions  Safety/Judgement: Awareness of environment  Cues: Verbal cues provided      Activity Tolerance:   Fair  Please refer to the flowsheet for vital signs taken during this treatment.     After treatment patient left in no apparent distress:   Sitting in chair, Call bell within reach, and Caregiver / family present    COMMUNICATION/COLLABORATION:   The patients plan of care was discussed with: Physical Therapist and Registered Nurse    ENEDELIA Valerio  Time Calculation: 26 mins

## 2019-12-31 NOTE — PROGRESS NOTES
ID Progress Note  2019    Subjective:     She is not feeling all that great--poor appetite, but out of the ICU    Objective:     Antibiotics:  1. Cefepime       Vitals:   Visit Vitals  /65 (BP 1 Location: Right arm, BP Patient Position: At rest)   Pulse 79   Temp 98.1 °F (36.7 °C)   Resp 18   Ht 5' 5\" (1.651 m) Comment: Historic   Wt 58.9 kg (129 lb 13.6 oz)   SpO2 98%   BMI 21.61 kg/m²        Tmax:  Temp (24hrs), Av.8 °F (36.6 °C), Min:97.5 °F (36.4 °C), Max:98.1 °F (36.7 °C)      Exam:  Lungs:  clear to auscultation bilaterally  Heart:  regular rate and rhythm  Abdomen:  soft, non-tender. Bowel sounds normal. No masses,  no organomegaly  Skin:  no rash or abnormalities  Port is benign    Labs:      Recent Labs     19  0749 19  0345 19  0353 19  0245   WBC 14.5*  --  15.0* 19.1*   HGB 8.8*  --  8.5* 8.5*     --  166 148*   BUN  --  31* 37* 40*   CREA  --  1.18* 1.34* 1.36*       Cultures:     No results found for: [facility], [place]  Lab Results   Component Value Date/Time    Culture result: NO GROWTH 3 DAYS 2019 04:18 AM    Culture result: ESCHERICHIA COLI (A) 2019 01:38 PM    Culture result: (A) 2019 01:07 PM     ESCHERICHIA COLI GROWING IN ALL 4 BOTTLES DRAWN (SITE=RAC AND L PORT)    Culture result: (A) 2019 01:07 PM     PRELIMINARY REPORT OF GRAM NEGATIVE RODS GROWING IN 3 OF 4 BOTTLES DRAWN CALLED TO AND READ BACK BY  Altagracia Cox RN ON 19 AT [id number].        Radiology:     Line/Insert Date:           Assessment:     1. Bacteremia--likely urinary source  2. Breast cancer, metastatic  3. Leukocytosis--improved  4. Fevers--resolved    Objective:     1. Continue current therapy  2. Follow up cultures and adjust antibiotics as needed  3. She will be able to be treated with oral cipro to complete a 14 day course of antibiotic therapy--20 is the stop date  4.  Group will see tomorrow if asked    Zuly Lovelace MD

## 2019-12-31 NOTE — PROGRESS NOTES
Cardiology Progress Note  12/31/2019 2:14 PM    Admit Date: 12/25/2019    Ad[facility]: [facility] [A41.9, R65.21]      Assessment:     Active Problems:    Septic shock ([facility] (12/25/2019)        Plan:     Chest Pain  improved    Continue medical therapy  Heart Failure unchanged    CXR today    Moriah Vines MD  [phone]  Cell        Subjective:     Sandy Ledesma admits to dyspnea. She reports symptoms are unchanged since yesterday. ROS: negative except as noted above.     Objective:       Visit Vitals  /65 (BP 1 Location: Right arm, BP Patient Position: At rest)   Pulse 79   Temp 98.1 °F (36.7 °C)   Resp 18   Ht 5' 5\" (1.651 m)   Wt 58.9 kg (129 lb 13.6 oz)   SpO2 98%   BMI 21.61 kg/m²       Current Facility-Administered Medications   Medication Dose Route Frequency    carvedilol (COREG) tablet 6.25 mg  6.25 mg Oral BID WITH MEALS    alteplase (CATHFLO) 1 mg in sterile water (preservative free) 1 mL injection  1 mg InterCATHeter PRN    bacitracin 500 unit/gram packet 1 Packet  1 Packet Topical PRN    heparin (porcine) pf 500 Units  500 Units InterCATHeter PRN    acetaminophen (TYLENOL) tablet 1,000 mg  1,000 mg Oral Q6H PRN    cefTRIAXone (ROCEPHIN) 1 g in 0.9% sodium chloride (MBP/ADV) 50 mL  1 g IntraVENous Q24H    glycerin (adult) suppository 1 Suppository  1 Suppository Rectal Q12H PRN    evolocumab (REPATHA SURECLICK) pen injection 353 mg (Patient Supplied)  1 mL SubCUTAneous Q 14 DAYS    magic mouthwash cpd (without sucralfate)  5 mL Oral TIDAC    Patient supplied Magic Mouthwash  (Patient Supplied)  5 mL Oral QID PRN    polyethylene glycol (MIRALAX) packet 17 g  17 g Oral DAILY    senna (SENOKOT) tablet 17.2 mg  2 Tab Oral DAILY    oxyCODONE IR (ROXICODONE) tablet 2.5 mg  2.5 mg Oral Q4H PRN    insulin glargine (LANTUS) injection 8 Units  8 Units SubCUTAneous QHS    furosemide (LASIX) injection 40 mg  40 mg IntraVENous DAILY    simethicone (MYLICON) tablet 80 mg  80 mg Oral QID PRN    sodium chloride (NS) flush 5-10 mL  5-10 mL IntraVENous PRN    sodium chloride (NS) flush 5-40 mL  5-40 mL IntraVENous Q8H    sodium chloride (NS) flush 5-40 mL  5-40 mL IntraVENous PRN    clopidogrel (PLAVIX) tablet 75 mg  75 mg Oral DAILY    aspirin chewable tablet 81 mg  81 mg Oral DAILY    levothyroxine (SYNTHROID) tablet 88 mcg  88 mcg Oral 6am    glucose chewable tablet 16 g  4 Tab Oral PRN    glucagon (GLUCAGEN) injection 1 mg  1 mg IntraMUSCular PRN    dextrose 10% infusion 0-250 mL  0-250 mL IntraVENous PRN    insulin lispro (HUMALOG) injection   SubCUTAneous AC&HS         Physical Exam:  Visit Vitals  /65 (BP 1 Location: Right arm, BP Patient Position: At rest)   Pulse 79   Temp 98.1 °F (36.7 °C)   Resp 18   Ht 5' 5\" (1.651 m) Comment: Historic   Wt 58.9 kg (129 lb 13.6 oz)   SpO2 98%   BMI 21.61 kg/m²     General Appearance:  Well developed, well nourished,alert and oriented x 3,  individual in no acute distress. Ears/Nose/Mouth/Throat:   Hearing grossly normal.         Neck: Supple. Chest:   Lungs some rhonchi to auscultation bilaterally. Possiable left effusion. Cardiovascular:  Regular rate and rhythm, S1, S2 normal, no murmur. Abdomen:   Soft, non-tender, bowel sounds are active. Extremities: No edema bilaterally. Skin: Warm and dry.                Telemetry: normal sinus rhythm    Data Review:     Labs:    Recent Results (from the past 24 hour(s))   GLUCOSE, POC    Collection Time: 12/30/19  4:41 PM   Result Value Ref Range    Glucose (POC) 239 (H) 65 - 100 mg/dL    Performed by [facility] [address] , POC    Collection Time: 12/30/19  9:29 PM   Result Value Ref Range    Glucose (POC) 175 (H) 65 - 100 mg/dL    Performed by Liliana Garcia    GLUCOSE, POC    Collection Time: 12/30/19 10:58 PM   Result Value Ref Range    Glucose (POC) 172 (H) 65 - 100 mg/dL    Performed by Tatiana Yee (ELKIN)    METABOLIC PANEL, BASIC    Collection Time: 12/31/19 3:45 AM   Result Value Ref Range    Sodium 137 136 - 145 mmol/L    Potassium 3.8 3.5 - 5.1 mmol/L    Chloride 104 97 - 108 mmol/L    CO2 25 21 - 32 mmol/L    Anion gap 8 5 - 15 mmol/L    Glucose 167 (H) 65 - 100 mg/dL    BUN 31 (H) 6 - 20 MG/DL    Creatinine 1.18 (H) 0.55 - 1.02 MG/DL    BUN/Creatinine ratio 26 (H) 12 - 20      GFR est AA 53 (L) >60 ml/min/1.73m2    GFR est non-AA 44 (L) >60 ml/min/1.73m2    Calcium 8.7 8.5 - 10.1 MG/DL   MAGNESIUM    Collection Time: 12/31/19  3:45 AM   Result Value Ref Range    Magnesium 1.9 1.6 - 2.4 mg/dL   PHOSPHORUS    Collection Time: 12/31/19  3:45 AM   Result Value Ref Range    Phosphorus 1.9 (L) 2.6 - 4.7 MG/DL   GLUCOSE, POC    Collection Time: 12/31/19  7:15 AM   Result Value Ref Range    Glucose (POC) 159 (H) 65 - 100 mg/dL    Performed by aMrleny Estrada    CBC WITH AUTOMATED DIFF    Collection Time: 12/31/19  7:49 AM   Result Value Ref Range    WBC 14.5 (H) 3.6 - 11.0 K/uL    RBC 2.80 (L) 3.80 - 5.20 M/uL    HGB 8.8 (L) 11.5 - 16.0 g/dL    HCT 28.3 (L) 35.0 - 47.0 %    .1 (H) 80.0 - 99.0 FL    MCH 31.4 26.0 - 34.0 PG    MCHC 31.1 30.0 - 36.5 g/dL    RDW 18.6 (H) 11.5 - 14.5 %    PLATELET 072 430 - 402 K/uL    MPV 10.8 8.9 - 12.9 FL    NRBC 0.2 (H) 0  WBC    ABSOLUTE NRBC 0.03 (H) 0.00 - 0.01 K/uL    NEUTROPHILS 82 (H) 32 - 75 %    LYMPHOCYTES 5 (L) 12 - 49 %    MONOCYTES 8 5 - 13 %    EOSINOPHILS 1 0 - 7 %    BASOPHILS 1 0 - 1 %    IMMATURE GRANULOCYTES 3 (H) 0.0 - 0.5 %    ABS. NEUTROPHILS 12.0 (H) 1.8 - 8.0 K/UL    ABS. LYMPHOCYTES 0.7 (L) 0.8 - 3.5 K/UL    ABS. MONOCYTES 1.2 (H) 0.0 - 1.0 K/UL    ABS. EOSINOPHILS 0.1 0.0 - 0.4 K/UL    ABS. BASOPHILS 0.1 0.0 - 0.1 K/UL    ABS. IMM.  GRANS. 0.4 (H) 0.00 - 0.04 K/UL    DF SMEAR SCANNED      RBC COMMENTS POLYCHROMASIA  1+        RBC COMMENTS ANISOCYTOSIS  1+        RBC COMMENTS MACROCYTOSIS  1+        RBC COMMENTS TEARDROP CELLS  PRESENT       GLUCOSE, POC    Collection Time: 12/31/19 11:18 AM   Result Value Ref Range    Glucose (POC) 246 (H) 65 - 100 mg/dL    Performed by Odilia Martinez MD

## 2019-12-31 NOTE — PROGRESS NOTES
Bedside shift change report given to [address] (oncoming nurse) by Mey Metcalf (offgoing nurse). Report included the following information SBAR, Kardex, Intake/Output, Recent Results and Med Rec Status.

## 2019-12-31 NOTE — PROGRESS NOTES
Hematology-Oncology Progress Note    Orlin Yeung  1935  [id number]  12/31/2019    Follow-up for: met.  Breast cancer     [x]        Chart notes since last visit reviewed   [x]        Medications reviewed for allergies and interactions       Case discussed with the following:         []                            []        Nursing Staff                                                                         []        Pathologist                                                                        [x]        FAMILY      Subjective:     Spoke with patient who complains of: stronger, less sob, no c/o pain,no new c/o    Objective:     Patient Vitals for the past 24 hrs:   BP Temp Pulse Resp SpO2 Weight   12/31/19 0802 136/74 97.5 °F (36.4 °C) 85 18 98 %    12/31/19 0338 157/78 97.9 °F (36.6 °C) 87 18 99 %    12/31/19 0336      58.9 kg (129 lb 13.6 oz)   12/30/19 2300 135/66 97.9 °F (36.6 °C) 79 18 99 %    12/30/19 2104 158/63 98 °F (36.7 °C) 80 20 100 %    12/30/19 1538 105/61 97.6 °F (36.4 °C) 79 18 95 %    12/30/19 1245 124/68 98 °F (36.7 °C) 66 16 96 %        REVIEW OF SYSTEMS:    Constitutional: negative fever, negative chills, negative weight loss  Eyes:   negative visual changes  ENT:   negative sore throat, tongue or lip swelling  Respiratory:  negative cough, negative dyspnea  Cards:  negative for chest pain, palpitations, lower extremity edema  GI:   negative for nausea, vomiting, diarrhea, and abdominal pain  Neuro:  negative for headaches, dizziness, vertigo  []                        Full ROS o/w normal/non contributor    Constitutional:  Patient looks  [x]        Sick  [x]        Frail  []        Better                                                 []        Depressed    HEENT:  [x]   NC                         []   AT               []    ALOPECIA     High flow oxygen in place      Eyes: [x]   Normal               []    Icteric  Oropharynx: []    Normal                  [] Thrush               []   Dry  Lungs. .. crackles in bases  Abd. Soft non tend  Mucositis: [x]    None         Neck:   [x]   Supple                  []  Rigid               Ext.  No cce  [x]        Normal  []        Confused      Available labs reviewed:  Labs:    Recent Results (from the past 24 hour(s))   GLUCOSE, POC    Collection Time: 12/30/19 12:58 PM   Result Value Ref Range    Glucose (POC) 225 (H) 65 - 100 mg/dL    Performed by Jones Mauro    GLUCOSE, POC    Collection Time: 12/30/19  4:41 PM   Result Value Ref Range    Glucose (POC) 239 (H) 65 - 100 mg/dL    Performed by Cecile Guzman    GLUCOSE, POC    Collection Time: 12/30/19  9:29 PM   Result Value Ref Range    Glucose (POC) 175 (H) 65 - 100 mg/dL    Performed by Dannielle Dewey    GLUCOSE, POC    Collection Time: 12/30/19 10:58 PM   Result Value Ref Range    Glucose (POC) 172 (H) 65 - 100 mg/dL    Performed by Heidi Mehta (CON)    METABOLIC PANEL, BASIC    Collection Time: 12/31/19  3:45 AM   Result Value Ref Range    Sodium 137 136 - 145 mmol/L    Potassium 3.8 3.5 - 5.1 mmol/L    Chloride 104 97 - 108 mmol/L    CO2 25 21 - 32 mmol/L    Anion gap 8 5 - 15 mmol/L    Glucose 167 (H) 65 - 100 mg/dL    BUN 31 (H) 6 - 20 MG/DL    Creatinine 1.18 (H) 0.55 - 1.02 MG/DL    BUN/Creatinine ratio 26 (H) 12 - 20      GFR est AA 53 (L) >60 ml/min/1.73m2    GFR est non-AA 44 (L) >60 ml/min/1.73m2    Calcium 8.7 8.5 - 10.1 MG/DL   MAGNESIUM    Collection Time: 12/31/19  3:45 AM   Result Value Ref Range    Magnesium 1.9 1.6 - 2.4 mg/dL   PHOSPHORUS    Collection Time: 12/31/19  3:45 AM   Result Value Ref Range    Phosphorus 1.9 (L) 2.6 - 4.7 MG/DL   GLUCOSE, POC    Collection Time: 12/31/19  7:15 AM   Result Value Ref Range    Glucose (POC) 159 (H) 65 - 100 mg/dL    Performed by Dannielle Dewey    CBC WITH AUTOMATED DIFF    Collection Time: 12/31/19  7:49 AM   Result Value Ref Range    WBC 14.5 (H) 3.6 - 11.0 K/uL    RBC 2.80 (L) 3.80 - 5.20 M/uL    HGB 8.8 (L) 11.5 - 16.0 g/dL    HCT 28.3 (L) 35.0 - 47.0 %    .1 (H) 80.0 - 99.0 FL    MCH 31.4 26.0 - 34.0 PG    MCHC 31.1 30.0 - 36.5 g/dL    RDW 18.6 (H) 11.5 - 14.5 %    PLATELET 737 818 - 821 K/uL    MPV 10.8 8.9 - 12.9 FL    NRBC 0.2 (H) 0  WBC    ABSOLUTE NRBC 0.03 (H) 0.00 - 0.01 K/uL    NEUTROPHILS 82 (H) 32 - 75 %    LYMPHOCYTES 5 (L) 12 - 49 %    MONOCYTES 8 5 - 13 %    EOSINOPHILS 1 0 - 7 %    BASOPHILS 1 0 - 1 %    IMMATURE GRANULOCYTES 3 (H) 0.0 - 0.5 %    ABS. NEUTROPHILS 12.0 (H) 1.8 - 8.0 K/UL    ABS. LYMPHOCYTES 0.7 (L) 0.8 - 3.5 K/UL    ABS. MONOCYTES 1.2 (H) 0.0 - 1.0 K/UL    ABS. EOSINOPHILS 0.1 0.0 - 0.4 K/UL    ABS. BASOPHILS 0.1 0.0 - 0.1 K/UL    ABS. IMM. GRANS. 0.4 (H) 0.00 - 0.04 K/UL    DF SMEAR SCANNED      RBC COMMENTS POLYCHROMASIA  1+        RBC COMMENTS ANISOCYTOSIS  1+        RBC COMMENTS MACROCYTOSIS  1+        RBC COMMENTS TEARDROP CELLS  PRESENT           Available Xrays reviewed:    Chemotherapy monitored and toxicities assessed:    Assessment and Plan   1. Met. Breast cancer. .. pt. Has had nice response to chemo (abraxane/xeloda) with recent documented decrease in hepatic and axillary involvement. .. her last rx was 12/16 (cycle 5 ) she received 7 days of xeloda ending on 12/22. Oris Garcia than neulasta on 12/23. .. she is \"due\" next week this will be held    2. Leukocytosis secondary to neulasta and sepsis. Oris Garcia improving 14.5 today    3. Anemia. Oris Garcia secondary to chemotherapy/ckd,, hgb 8.8 today    4. Gram negative sepsis 4/4 bttls +. Oris Garcia Oris Garcia ID - E. Coli - pan sensitive. ....continue abx per Dr. Arabella Mcclellan    5. Chest pain / sob. .. NSTEMI - Medical Management, EF - low but unchanged from June 2019    Appreciate everyone's help with her care.       Vinicius Etienne MD    .

## 2019-12-31 NOTE — PROGRESS NOTES
Problem: Mobility Impaired (Adult and Pediatric)  Goal: *Acute Goals and Plan of Care (Insert Text)  Description  FUNCTIONAL STATUS PRIOR TO ADMISSION: Patient was modified independent using a rollator for functional mobility. HOME SUPPORT PRIOR TO ADMISSION: The patient lived with family prior to admission and was never home alone. Physical Therapy Goals  Initiated 12/29/2019  1. Patient will move from supine to sit and sit to supine  and roll side to side in bed with supervision/set-up within 7 day(s). 2.  Patient will transfer from bed to chair and chair to bed with minimal assistance/contact guard assist using the least restrictive device within 7 day(s). 3.  Patient will perform sit to stand with minimal assistance/contact guard assist within 7 day(s). 4.  Patient will ambulate with minimal assistance/contact guard assist for 50 feet with the least restrictive device within 7 day(s). 5.  Patient will ascend/descend 5 stairs with 1 handrail(s) with minimal assistance/contact guard assist within 7 day(s). Outcome: Progressing Towards Goal   PHYSICAL THERAPY TREATMENT  Patient: Maria Teresa Kraus (17 y.o. female)  Date: 12/31/2019  [facility] [A41.9, R65.21]   <principal problem not specified>       Precautions: Fall  Chart, physical therapy assessment, plan of care and goals were reviewed. ASSESSMENT  Patient continues with skilled PT services and is progressing towards goals. Pt continues with generalized weakness, decreased endurance and decreased independence for functional mobility. Recommend rehab to increase independence, safety and endurance. Current Level of Function Impacting Discharge (mobility/balance):  Mod assist to get OOB, min assist to CGA for transfers/ambulation    Other factors to consider for discharge: PLOF - safe/indep for transfers and short distance amb to bathroom         PLAN :  Patient continues to benefit from skilled intervention to address the above impairments. Continue treatment per established plan of care. to address goals. Recommendation for discharge: (in order for the patient to meet his/her long term goals)  Therapy 3 hours per day 5-7 days per week    This discharge recommendation:  Has been made in collaboration with the attending provider and/or case management    IF patient discharges home will need the following DME: none       SUBJECTIVE:   Patient stated I do feel I'm getting stronger.     OBJECTIVE DATA SUMMARY:   Critical Behavior:  Neurologic State: Alert  Orientation Level: Oriented X4  Cognition: Follows commands  Safety/Judgement: Awareness of environment  Functional Mobility Training:  Bed Mobility:     Supine to Sit: Moderate assistance              Transfers:  Sit to Stand: Contact guard assistance  Stand to Sit: Contact guard assistance(verbal cues to reach back to chair)        Bed to Chair: Contact guard assistance                    Balance:  Sitting: Intact; Without support  Standing: Impaired; With support  Standing - Static: Good;Constant support  Standing - Dynamic : Fair;Constant support  Ambulation/Gait Training:  Distance (ft): 60 Feet (ft)  Assistive Device: Walker, rolling;Gait belt  Ambulation - Level of Assistance: Contact guard assistance        Gait Abnormalities: Decreased step clearance; Step to gait(left foot drop)        Base of Support: Widened     Speed/Haylee: Slow  Step Length: Right shortened;Left shortened  Swing Pattern: Left asymmetrical     Interventions: Safety awareness training;Verbal cues         Pain Ratin/10 reported chronic arthritic pain bilateral hands and lower legs    Activity Tolerance:   Fair  Please refer to the flowsheet for vital signs taken during this treatment.     After treatment patient left in no apparent distress:   Sitting in chair, Call bell within reach, and Caregiver / family present    COMMUNICATION/COLLABORATION:   The patients plan of care was discussed with: Registered Nurse    Malini Jay, PT   Time Calculation: 25 mins

## 2019-12-31 NOTE — PROGRESS NOTES
[address] [facility] Adult  Hospitalist Group                                                                                          Hospitalist Progress Note  Osvaldo Santoro MD  Answering service: [phone] OR [phone] from in house phone        Date of Service:  2019  NAME:  Jeffry Pleitez  :  1935  MRN:  [id number]      Admission Summary:   Jeffry Pleitez is a 82 y.o. female with a past medical history of metastatic breast cancer on active chemotherapy, HFrEF, frequent UTIs, diabetes, CVA, RA, MI s/p stents who presented to the ED with reports of fever on , she was admitted to the ICU with septic shock. Interval history / Subjective:   Patient is alert and, oriented she is getting better. Afebrile. She is getting up and moving about     Assessment & Plan:     #. E. coli bacteremia and bacteriuria  #. Septic Shock: 2nd to above- resolved. -Off pressor   -Continue ceftriaxone. WBC trending down  -ID following     #. NSTEMI: Cardiology following: medical Tx, no procedures planned. #. Acute on chronic systolic CHF: NYHA 4. Ef 70-40%.  -On carvedilol 3.125 mg twice daily, Lasix 40 mg daily. Not on ARN I, ACEI or ARB possibly due to JEROD and unstable renal function. Cardiology on board  #. Acute hypoxic respiratory failure due to CHF exacerbation: - resolved. On room air  - off HFNC. continue lasix 40mg daily, monitor lytes     #. CKD3: monitor renal function, avoid nephrotoxics creatinine improving  #. HypoNatremia: mild, monitor. Sodium normalized  #. HypoPhosphatemia: replace, monitor  #. Constipation: laxatives, Suppositories prn  5. Met. Breast cancer: followed by Dr. Cortes Oro. S/p recent Chemo- hold chemo till recovers  6. Chronic Anemia: monitor HB, transfuse prn if Hb< 8 due to her NSTEMI   7. Left calf pain: - Duplex -ve for DVTs. 8. DM: SSI, home regimen  #. RA: known to Dr Shar Sharp, has R wrist swelling/discomfort. Consult placed, he called and advised will f/u op.   #LYDIA: She wears CPAP at home, I have requested the family to bring her CPAP or may have to order from here    Code status: Full code  DVT prophylaxis: scds  PT and OT suggesting rehab. Hospital Problems  Date Reviewed: 11/13/2019          Codes Class Noted POA    Septic shock ([facility] [address] ICD-10-CM: A41.9, R65.21  ICD-9-CM: 038.9, 785.52, 995.92  12/25/2019 Unknown                Review of Systems:   A comprehensive review of systems was negative except for that written in the HPI. Vital Signs:    Last 24hrs VS reviewed since prior progress note. Most recent are:  Visit Vitals  /74 (BP 1 Location: Right arm, BP Patient Position: At rest)   Pulse 85   Temp 97.5 °F (36.4 °C)   Resp 18   Ht 5' 5\" (1.651 m)   Wt 58.9 kg (129 lb 13.6 oz)   SpO2 98%   BMI 21.61 kg/m²       No intake or output data in the 24 hours ending 12/31/19 0934     Physical Examination:             Constitutional:  No acute distress, cooperative   ENT:  Oral mucous moist, oropharynx benign. Resp:  Has port on the right chest wall. CTA bilaterally. No wheezing/rhonchi/rales. No accessory muscle use   CV:  Regular rhythm, normal rate, no murmurs, gallops, rubs    GI:  Soft, non distended, non tender. normoactive bowel sounds, no hepatosplenomegaly     Musculoskeletal:  SCDs on. No edema, warm, 2+ pulses throughout    Neurologic:  Moves all extremities. AAOx3, CN II-XII reviewed     Psych:  Good insight, Not anxious nor agitated.        Data Review:    Review and/or order of clinical lab test  Review and/or order of tests in the radiology section of CPT  Review and/or order of tests in the medicine section of CPT      Labs:     Recent Labs     12/31/19  0749 12/30/19  0353   WBC 14.5* 15.0*   HGB 8.8* 8.5*   HCT 28.3* 26.3*    166     Recent Labs     12/31/19  0345 12/30/19  0353 12/29/19  0245    137 138   K 3.8 3.7 4.4    104 107   CO2 25 24 24   BUN 31* 37* 40*   CREA 1.18* 1.34* 1.36*   * 203* 197*   CA 8.7 8.3* 7.5* MG 1.9 2.1 2.1   PHOS 1.9* 2.0* 1.9*     No results for input(s): SGOT, GPT, ALT, AP, TBIL, TBILI, TP, ALB, GLOB, GGT, AML, LPSE in the last 72 hours. No lab exists for component: AMYP, HLPSE  No results for input(s): INR, PTP, APTT, INREXT in the last 72 hours. No results for input(s): FE, TIBC, PSAT, FERR in the last 72 hours. No results found for: FOL, RBCF   No results for input(s): PH, PCO2, PO2 in the last 72 hours.   Recent Labs     12/29/19  0245   TROIQ 10.90*     Lab Results   Component Value Date/Time    Cholesterol, total 74 04/16/2018 04:21 AM    HDL Cholesterol 25 04/16/2018 04:21 AM    LDL, calculated 31.6 04/16/2018 04:21 AM    Triglyceride 87 04/16/2018 04:21 AM    CHOL/HDL Ratio 3.0 04/16/2018 04:21 AM     Lab Results   Component Value Date/Time    Glucose (POC) 159 (H) 12/31/2019 07:15 AM    Glucose (POC) 172 (H) 12/30/2019 10:58 PM    Glucose (POC) 175 (H) 12/30/2019 09:29 PM    Glucose (POC) 239 (H) 12/30/2019 04:41 PM    Glucose (POC) 225 (H) 12/30/2019 12:58 PM     Lab Results   Component Value Date/Time    Color YELLOW/STRAW 12/25/2019 01:38 PM    Appearance CLOUDY (A) 12/25/2019 01:38 PM    Specific gravity 1.013 12/25/2019 01:38 PM    pH (UA) 5.5 12/25/2019 01:38 PM    Protein 100 (A) 12/25/2019 01:38 PM    Glucose NEGATIVE  12/25/2019 01:38 PM    Ketone NEGATIVE  12/25/2019 01:38 PM    Bilirubin NEGATIVE  12/25/2019 01:38 PM    Urobilinogen 0.2 12/25/2019 01:38 PM    Nitrites POSITIVE (A) 12/25/2019 01:38 PM    Leukocyte Esterase MODERATE (A) 12/25/2019 01:38 PM    Epithelial cells FEW 12/25/2019 01:38 PM    Bacteria 4+ (A) 12/25/2019 01:38 PM    WBC >100 (H) 12/25/2019 01:38 PM    RBC 0-5 12/25/2019 01:38 PM         Medications Reviewed:     Current Facility-Administered Medications   Medication Dose Route Frequency    alteplase (CATHFLO) 1 mg in sterile water (preservative free) 1 mL injection  1 mg InterCATHeter PRN    bacitracin 500 unit/gram packet 1 Packet  1 Packet Topical PRN    heparin (porcine) pf 500 Units  500 Units InterCATHeter PRN    acetaminophen (TYLENOL) tablet 1,000 mg  1,000 mg Oral Q6H PRN    cefTRIAXone (ROCEPHIN) 1 g in 0.9% sodium chloride (MBP/ADV) 50 mL  1 g IntraVENous Q24H    carvedilol (COREG) tablet 3.125 mg  3.125 mg Oral BID WITH MEALS    glycerin (adult) suppository 1 Suppository  1 Suppository Rectal Q12H PRN    evolocumab (REPATHA SURECLICK) pen injection 889 mg (Patient Supplied)  1 mL SubCUTAneous Q 14 DAYS    magic mouthwash cpd (without sucralfate)  5 mL Oral TIDAC    Patient supplied Magic Mouthwash  (Patient Supplied)  5 mL Oral QID PRN    polyethylene glycol (MIRALAX) packet 17 g  17 g Oral DAILY    senna (SENOKOT) tablet 17.2 mg  2 Tab Oral DAILY    oxyCODONE IR (ROXICODONE) tablet 2.5 mg  2.5 mg Oral Q4H PRN    insulin glargine (LANTUS) injection 8 Units  8 Units SubCUTAneous QHS    furosemide (LASIX) injection 40 mg  40 mg IntraVENous DAILY    simethicone (MYLICON) tablet 80 mg  80 mg Oral QID PRN    sodium chloride (NS) flush 5-10 mL  5-10 mL IntraVENous PRN    sodium chloride (NS) flush 5-40 mL  5-40 mL IntraVENous Q8H    sodium chloride (NS) flush 5-40 mL  5-40 mL IntraVENous PRN    clopidogrel (PLAVIX) tablet 75 mg  75 mg Oral DAILY    aspirin chewable tablet 81 mg  81 mg Oral DAILY    levothyroxine (SYNTHROID) tablet 88 mcg  88 mcg Oral 6am    glucose chewable tablet 16 g  4 Tab Oral PRN    glucagon (GLUCAGEN) injection 1 mg  1 mg IntraMUSCular PRN    dextrose 10% infusion 0-250 mL  0-250 mL IntraVENous PRN    insulin lispro (HUMALOG) injection   SubCUTAneous AC&HS     ______________________________________________________________________  EXPECTED LENGTH OF STAY: - - -  ACTUAL LENGTH OF STAY:          6                 Camila Robles MD

## 2019-12-31 NOTE — PROGRESS NOTES
MOY: The patient has been accepted by [facility] and they have started the insurance authorization for admission. Per Dr. Barrios Deep ID this patient will need PO abx at discharge. CRM spoke wit Dr. Kathleen Yates regarding the above and he is in agreement.      Jeni Burroughs, [address] [place] [address]   [phone]

## 2019-12-31 NOTE — PROGRESS NOTES
Problem: Pressure Injury - Risk of  Goal: *Prevention of pressure injury  Description  Document Calderon Scale and appropriate interventions in the flowsheet.   Outcome: Progressing Towards Goal  Note: Pressure Injury Interventions:       Moisture Interventions: Apply protective barrier, creams and emollients    Activity Interventions: Increase time out of bed    Mobility Interventions: Pressure redistribution bed/mattress (bed type)    Nutrition Interventions: Document food/fluid/supplement intake    Friction and Shear Interventions: HOB 30 degrees or less

## 2020-01-01 ENCOUNTER — APPOINTMENT (OUTPATIENT)
Dept: GENERAL RADIOLOGY | Age: 85
End: 2020-01-01
Attending: EMERGENCY MEDICINE
Payer: MEDICAID

## 2020-01-01 ENCOUNTER — HOME CARE VISIT (OUTPATIENT)
Dept: SCHEDULING | Facility: HOME HEALTH | Age: 85
End: 2020-01-01
Payer: MEDICARE

## 2020-01-01 ENCOUNTER — HOME HEALTH ADMISSION (OUTPATIENT)
Dept: HOME HEALTH SERVICES | Facility: HOME HEALTH | Age: 85
End: 2020-01-01

## 2020-01-01 ENCOUNTER — ANESTHESIA EVENT (OUTPATIENT)
Dept: SURGERY | Age: 85
DRG: 871 | End: 2020-01-01
Payer: MEDICARE

## 2020-01-01 ENCOUNTER — HOME CARE VISIT (OUTPATIENT)
Dept: HOME HEALTH SERVICES | Facility: HOME HEALTH | Age: 85
End: 2020-01-01
Payer: MEDICARE

## 2020-01-01 ENCOUNTER — APPOINTMENT (OUTPATIENT)
Dept: GENERAL RADIOLOGY | Age: 85
End: 2020-01-01
Attending: EMERGENCY MEDICINE
Payer: MEDICARE

## 2020-01-01 ENCOUNTER — VIRTUAL VISIT (OUTPATIENT)
Dept: FAMILY MEDICINE CLINIC | Age: 85
End: 2020-01-01

## 2020-01-01 ENCOUNTER — OFFICE VISIT (OUTPATIENT)
Dept: PRIMARY CARE CLINIC | Age: 85
End: 2020-01-01

## 2020-01-01 ENCOUNTER — HOME CARE VISIT (OUTPATIENT)
Dept: SCHEDULING | Facility: HOME HEALTH | Age: 85
End: 2020-01-01

## 2020-01-01 ENCOUNTER — TELEPHONE (OUTPATIENT)
Dept: PRIMARY CARE CLINIC | Age: 85
End: 2020-01-01

## 2020-01-01 ENCOUNTER — TELEPHONE (OUTPATIENT)
Dept: FAMILY MEDICINE CLINIC | Age: 85
End: 2020-01-01

## 2020-01-01 ENCOUNTER — PATIENT OUTREACH (OUTPATIENT)
Dept: CASE MANAGEMENT | Age: 85
End: 2020-01-01

## 2020-01-01 ENCOUNTER — APPOINTMENT (OUTPATIENT)
Dept: GENERAL RADIOLOGY | Age: 85
DRG: 280 | End: 2020-01-01
Attending: EMERGENCY MEDICINE
Payer: MEDICARE

## 2020-01-01 ENCOUNTER — HOME CARE VISIT (OUTPATIENT)
Dept: HOME HEALTH SERVICES | Facility: HOME HEALTH | Age: 85
End: 2020-01-01

## 2020-01-01 ENCOUNTER — TELEPHONE (OUTPATIENT)
Dept: RHEUMATOLOGY | Age: 85
End: 2020-01-01

## 2020-01-01 ENCOUNTER — HOSPITAL ENCOUNTER (EMERGENCY)
Age: 85
Discharge: HOME | End: 2020-08-11
Attending: EMERGENCY MEDICINE
Payer: MEDICARE

## 2020-01-01 ENCOUNTER — VIRTUAL VISIT (OUTPATIENT)
Dept: INTERNAL MEDICINE CLINIC | Age: 85
End: 2020-01-01

## 2020-01-01 ENCOUNTER — OFFICE VISIT (OUTPATIENT)
Dept: FAMILY MEDICINE CLINIC | Age: 85
End: 2020-01-01

## 2020-01-01 ENCOUNTER — TELEPHONE (OUTPATIENT)
Dept: INTERNAL MEDICINE CLINIC | Age: 85
End: 2020-01-01

## 2020-01-01 ENCOUNTER — OFFICE VISIT (OUTPATIENT)
Dept: RHEUMATOLOGY | Age: 85
End: 2020-01-01

## 2020-01-01 ENCOUNTER — VIRTUAL VISIT (OUTPATIENT)
Dept: CARDIOLOGY CLINIC | Age: 85
End: 2020-01-01

## 2020-01-01 ENCOUNTER — HOME HEALTH ADMISSION (OUTPATIENT)
Dept: HOME HEALTH SERVICES | Facility: HOME HEALTH | Age: 85
End: 2020-01-01
Payer: MEDICARE

## 2020-01-01 ENCOUNTER — APPOINTMENT (OUTPATIENT)
Dept: GENERAL RADIOLOGY | Age: 85
DRG: 871 | End: 2020-01-01
Attending: EMERGENCY MEDICINE
Payer: MEDICARE

## 2020-01-01 ENCOUNTER — APPOINTMENT (OUTPATIENT)
Dept: CT IMAGING | Age: 85
DRG: 871 | End: 2020-01-01
Attending: EMERGENCY MEDICINE
Payer: MEDICARE

## 2020-01-01 ENCOUNTER — HOSPITAL ENCOUNTER (EMERGENCY)
Age: 85
Discharge: HOME | End: 2020-06-17
Attending: EMERGENCY MEDICINE | Admitting: EMERGENCY MEDICINE
Payer: MEDICAID

## 2020-01-01 ENCOUNTER — DOCUMENTATION ONLY (OUTPATIENT)
Dept: INTERNAL MEDICINE CLINIC | Age: 85
End: 2020-01-01

## 2020-01-01 ENCOUNTER — APPOINTMENT (OUTPATIENT)
Dept: NON INVASIVE DIAGNOSTICS | Age: 85
DRG: 280 | End: 2020-01-01
Attending: FAMILY MEDICINE
Payer: MEDICARE

## 2020-01-01 ENCOUNTER — APPOINTMENT (OUTPATIENT)
Dept: GENERAL RADIOLOGY | Age: 85
End: 2020-01-01
Attending: STUDENT IN AN ORGANIZED HEALTH CARE EDUCATION/TRAINING PROGRAM
Payer: MEDICARE

## 2020-01-01 ENCOUNTER — APPOINTMENT (OUTPATIENT)
Dept: GENERAL RADIOLOGY | Age: 85
DRG: 871 | End: 2020-01-01
Attending: STUDENT IN AN ORGANIZED HEALTH CARE EDUCATION/TRAINING PROGRAM
Payer: MEDICARE

## 2020-01-01 ENCOUNTER — APPOINTMENT (OUTPATIENT)
Dept: GENERAL RADIOLOGY | Age: 85
DRG: 871 | End: 2020-01-01
Attending: PHYSICIAN ASSISTANT
Payer: MEDICARE

## 2020-01-01 ENCOUNTER — APPOINTMENT (OUTPATIENT)
Dept: GENERAL RADIOLOGY | Age: 85
DRG: 871 | End: 2020-01-01
Attending: ANESTHESIOLOGY
Payer: MEDICARE

## 2020-01-01 ENCOUNTER — APPOINTMENT (OUTPATIENT)
Dept: NON INVASIVE DIAGNOSTICS | Age: 85
DRG: 871 | End: 2020-01-01
Attending: INTERNAL MEDICINE
Payer: MEDICARE

## 2020-01-01 ENCOUNTER — HOSPITAL ENCOUNTER (EMERGENCY)
Age: 85
Discharge: HOME | End: 2020-05-26
Attending: EMERGENCY MEDICINE | Admitting: EMERGENCY MEDICINE
Payer: MEDICARE

## 2020-01-01 ENCOUNTER — PATIENT OUTREACH (OUTPATIENT)
Dept: INTERNAL MEDICINE CLINIC | Age: 85
End: 2020-01-01

## 2020-01-01 ENCOUNTER — OFFICE VISIT (OUTPATIENT)
Dept: CARDIOLOGY CLINIC | Age: 85
End: 2020-01-01

## 2020-01-01 ENCOUNTER — APPOINTMENT (OUTPATIENT)
Dept: GENERAL RADIOLOGY | Age: 85
DRG: 871 | End: 2020-01-01
Attending: INTERNAL MEDICINE
Payer: MEDICARE

## 2020-01-01 ENCOUNTER — HOSPITAL ENCOUNTER (INPATIENT)
Age: 85
LOS: 8 days | Discharge: HOME HEALTH | DRG: 871 | End: 2020-04-27
Attending: EMERGENCY MEDICINE | Admitting: HOSPITALIST
Payer: MEDICARE

## 2020-01-01 ENCOUNTER — HOSPITAL ENCOUNTER (INPATIENT)
Age: 85
LOS: 7 days | DRG: 871 | End: 2020-08-24
Attending: EMERGENCY MEDICINE | Admitting: INTERNAL MEDICINE
Payer: MEDICARE

## 2020-01-01 ENCOUNTER — APPOINTMENT (OUTPATIENT)
Dept: NON INVASIVE DIAGNOSTICS | Age: 85
DRG: 871 | End: 2020-01-01
Attending: STUDENT IN AN ORGANIZED HEALTH CARE EDUCATION/TRAINING PROGRAM
Payer: MEDICARE

## 2020-01-01 ENCOUNTER — ANESTHESIA (OUTPATIENT)
Dept: SURGERY | Age: 85
DRG: 871 | End: 2020-01-01
Payer: MEDICARE

## 2020-01-01 ENCOUNTER — HOSPITAL ENCOUNTER (EMERGENCY)
Age: 85
Discharge: HOME | End: 2020-07-16
Attending: STUDENT IN AN ORGANIZED HEALTH CARE EDUCATION/TRAINING PROGRAM | Admitting: STUDENT IN AN ORGANIZED HEALTH CARE EDUCATION/TRAINING PROGRAM
Payer: MEDICARE

## 2020-01-01 ENCOUNTER — HOSPITAL ENCOUNTER (OUTPATIENT)
Dept: PHYSICAL THERAPY | Age: 85
End: 2020-01-01

## 2020-01-01 ENCOUNTER — HOSPITAL ENCOUNTER (INPATIENT)
Age: 85
LOS: 4 days | Discharge: HOME HEALTH | DRG: 280 | End: 2020-08-16
Attending: EMERGENCY MEDICINE | Admitting: FAMILY MEDICINE
Payer: MEDICARE

## 2020-01-01 VITALS
TEMPERATURE: 98.4 F | DIASTOLIC BLOOD PRESSURE: 71 MMHG | BODY MASS INDEX: 23.98 KG/M2 | HEART RATE: 83 BPM | HEIGHT: 61 IN | SYSTOLIC BLOOD PRESSURE: 113 MMHG | WEIGHT: 127 LBS | RESPIRATION RATE: 18 BRPM

## 2020-01-01 VITALS
TEMPERATURE: 98.4 F | DIASTOLIC BLOOD PRESSURE: 72 MMHG | RESPIRATION RATE: 16 BRPM | SYSTOLIC BLOOD PRESSURE: 185 MMHG | BODY MASS INDEX: 24.37 KG/M2 | HEART RATE: 78 BPM | WEIGHT: 128.97 LBS | OXYGEN SATURATION: 100 %

## 2020-01-01 VITALS
RESPIRATION RATE: 16 BRPM | TEMPERATURE: 98.1 F | DIASTOLIC BLOOD PRESSURE: 62 MMHG | HEART RATE: 85 BPM | SYSTOLIC BLOOD PRESSURE: 116 MMHG | OXYGEN SATURATION: 98 %

## 2020-01-01 VITALS
OXYGEN SATURATION: 99 % | SYSTOLIC BLOOD PRESSURE: 110 MMHG | HEART RATE: 75 BPM | RESPIRATION RATE: 18 BRPM | TEMPERATURE: 99 F | DIASTOLIC BLOOD PRESSURE: 60 MMHG

## 2020-01-01 VITALS
RESPIRATION RATE: 18 BRPM | OXYGEN SATURATION: 98 % | HEART RATE: 69 BPM | DIASTOLIC BLOOD PRESSURE: 64 MMHG | SYSTOLIC BLOOD PRESSURE: 110 MMHG | TEMPERATURE: 98.6 F

## 2020-01-01 VITALS
TEMPERATURE: 98.1 F | HEART RATE: 77 BPM | SYSTOLIC BLOOD PRESSURE: 110 MMHG | OXYGEN SATURATION: 98 % | DIASTOLIC BLOOD PRESSURE: 70 MMHG | RESPIRATION RATE: 16 BRPM

## 2020-01-01 VITALS
RESPIRATION RATE: 17 BRPM | DIASTOLIC BLOOD PRESSURE: 76 MMHG | OXYGEN SATURATION: 98 % | TEMPERATURE: 98 F | SYSTOLIC BLOOD PRESSURE: 123 MMHG | HEART RATE: 73 BPM

## 2020-01-01 VITALS
TEMPERATURE: 98.4 F | OXYGEN SATURATION: 95 % | DIASTOLIC BLOOD PRESSURE: 55 MMHG | HEART RATE: 74 BPM | SYSTOLIC BLOOD PRESSURE: 95 MMHG

## 2020-01-01 VITALS
OXYGEN SATURATION: 97 % | HEART RATE: 72 BPM | SYSTOLIC BLOOD PRESSURE: 126 MMHG | RESPIRATION RATE: 16 BRPM | DIASTOLIC BLOOD PRESSURE: 68 MMHG | TEMPERATURE: 98.4 F

## 2020-01-01 VITALS
RESPIRATION RATE: 18 BRPM | OXYGEN SATURATION: 99 % | SYSTOLIC BLOOD PRESSURE: 110 MMHG | HEART RATE: 75 BPM | DIASTOLIC BLOOD PRESSURE: 60 MMHG | TEMPERATURE: 99 F

## 2020-01-01 VITALS
DIASTOLIC BLOOD PRESSURE: 60 MMHG | OXYGEN SATURATION: 98 % | SYSTOLIC BLOOD PRESSURE: 110 MMHG | RESPIRATION RATE: 17 BRPM | HEART RATE: 70 BPM | TEMPERATURE: 98 F

## 2020-01-01 VITALS
HEART RATE: 65 BPM | DIASTOLIC BLOOD PRESSURE: 60 MMHG | RESPIRATION RATE: 18 BRPM | TEMPERATURE: 98 F | SYSTOLIC BLOOD PRESSURE: 130 MMHG | OXYGEN SATURATION: 96 %

## 2020-01-01 VITALS
SYSTOLIC BLOOD PRESSURE: 142 MMHG | OXYGEN SATURATION: 98 % | DIASTOLIC BLOOD PRESSURE: 66 MMHG | RESPIRATION RATE: 16 BRPM | TEMPERATURE: 98.3 F | HEART RATE: 73 BPM

## 2020-01-01 VITALS
BODY MASS INDEX: 26.01 KG/M2 | OXYGEN SATURATION: 99 % | SYSTOLIC BLOOD PRESSURE: 122 MMHG | RESPIRATION RATE: 16 BRPM | DIASTOLIC BLOOD PRESSURE: 70 MMHG | HEART RATE: 93 BPM | HEIGHT: 61 IN | WEIGHT: 137.79 LBS | TEMPERATURE: 97.7 F

## 2020-01-01 VITALS
HEART RATE: 81 BPM | OXYGEN SATURATION: 97 % | SYSTOLIC BLOOD PRESSURE: 106 MMHG | TEMPERATURE: 97.8 F | DIASTOLIC BLOOD PRESSURE: 60 MMHG | RESPIRATION RATE: 18 BRPM

## 2020-01-01 VITALS
OXYGEN SATURATION: 98 % | WEIGHT: 132 LBS | TEMPERATURE: 98.2 F | DIASTOLIC BLOOD PRESSURE: 59 MMHG | HEIGHT: 61 IN | SYSTOLIC BLOOD PRESSURE: 113 MMHG | WEIGHT: 128.7 LBS | HEART RATE: 68 BPM | RESPIRATION RATE: 16 BRPM | DIASTOLIC BLOOD PRESSURE: 60 MMHG | BODY MASS INDEX: 24.92 KG/M2 | SYSTOLIC BLOOD PRESSURE: 124 MMHG | BODY MASS INDEX: 24.32 KG/M2

## 2020-01-01 VITALS
TEMPERATURE: 97.3 F | HEIGHT: 61 IN | OXYGEN SATURATION: 98 % | BODY MASS INDEX: 30.8 KG/M2 | WEIGHT: 163.14 LBS | DIASTOLIC BLOOD PRESSURE: 46 MMHG | RESPIRATION RATE: 6 BRPM | SYSTOLIC BLOOD PRESSURE: 78 MMHG

## 2020-01-01 VITALS
HEART RATE: 98 BPM | DIASTOLIC BLOOD PRESSURE: 75 MMHG | OXYGEN SATURATION: 97 % | SYSTOLIC BLOOD PRESSURE: 155 MMHG | TEMPERATURE: 97.8 F | RESPIRATION RATE: 16 BRPM

## 2020-01-01 VITALS
HEART RATE: 77 BPM | SYSTOLIC BLOOD PRESSURE: 134 MMHG | RESPIRATION RATE: 16 BRPM | OXYGEN SATURATION: 99 % | BODY MASS INDEX: 22.53 KG/M2 | HEIGHT: 65 IN | DIASTOLIC BLOOD PRESSURE: 76 MMHG

## 2020-01-01 VITALS
RESPIRATION RATE: 18 BRPM | TEMPERATURE: 98 F | HEART RATE: 76 BPM | OXYGEN SATURATION: 98 % | SYSTOLIC BLOOD PRESSURE: 110 MMHG | DIASTOLIC BLOOD PRESSURE: 60 MMHG

## 2020-01-01 VITALS
HEART RATE: 80 BPM | TEMPERATURE: 98.4 F | RESPIRATION RATE: 18 BRPM | SYSTOLIC BLOOD PRESSURE: 132 MMHG | DIASTOLIC BLOOD PRESSURE: 70 MMHG | OXYGEN SATURATION: 96 %

## 2020-01-01 VITALS
DIASTOLIC BLOOD PRESSURE: 66 MMHG | WEIGHT: 138.89 LBS | HEART RATE: 89 BPM | BODY MASS INDEX: 26.22 KG/M2 | SYSTOLIC BLOOD PRESSURE: 118 MMHG | OXYGEN SATURATION: 97 % | HEIGHT: 61 IN | TEMPERATURE: 97.8 F | RESPIRATION RATE: 17 BRPM

## 2020-01-01 VITALS
TEMPERATURE: 98.1 F | RESPIRATION RATE: 18 BRPM | SYSTOLIC BLOOD PRESSURE: 110 MMHG | DIASTOLIC BLOOD PRESSURE: 70 MMHG | OXYGEN SATURATION: 98 % | HEART RATE: 74 BPM

## 2020-01-01 VITALS
TEMPERATURE: 97.9 F | SYSTOLIC BLOOD PRESSURE: 119 MMHG | HEART RATE: 79 BPM | DIASTOLIC BLOOD PRESSURE: 62 MMHG | OXYGEN SATURATION: 98 %

## 2020-01-01 VITALS
OXYGEN SATURATION: 99 % | HEART RATE: 67 BPM | SYSTOLIC BLOOD PRESSURE: 112 MMHG | RESPIRATION RATE: 18 BRPM | TEMPERATURE: 98.6 F | DIASTOLIC BLOOD PRESSURE: 60 MMHG

## 2020-01-01 VITALS
OXYGEN SATURATION: 97 % | DIASTOLIC BLOOD PRESSURE: 74 MMHG | DIASTOLIC BLOOD PRESSURE: 74 MMHG | TEMPERATURE: 98.4 F | TEMPERATURE: 98.4 F | HEART RATE: 77 BPM | RESPIRATION RATE: 16 BRPM | SYSTOLIC BLOOD PRESSURE: 141 MMHG | OXYGEN SATURATION: 98 % | HEART RATE: 72 BPM | RESPIRATION RATE: 16 BRPM | SYSTOLIC BLOOD PRESSURE: 134 MMHG

## 2020-01-01 VITALS
RESPIRATION RATE: 18 BRPM | OXYGEN SATURATION: 98 % | SYSTOLIC BLOOD PRESSURE: 120 MMHG | HEART RATE: 72 BPM | DIASTOLIC BLOOD PRESSURE: 74 MMHG | TEMPERATURE: 98.6 F

## 2020-01-01 VITALS
HEART RATE: 80 BPM | OXYGEN SATURATION: 97 % | HEART RATE: 74 BPM | OXYGEN SATURATION: 97 % | SYSTOLIC BLOOD PRESSURE: 124 MMHG | TEMPERATURE: 98.6 F | DIASTOLIC BLOOD PRESSURE: 72 MMHG | RESPIRATION RATE: 16 BRPM | DIASTOLIC BLOOD PRESSURE: 64 MMHG | TEMPERATURE: 98.4 F | RESPIRATION RATE: 16 BRPM | SYSTOLIC BLOOD PRESSURE: 132 MMHG

## 2020-01-01 VITALS
HEART RATE: 73 BPM | DIASTOLIC BLOOD PRESSURE: 60 MMHG | OXYGEN SATURATION: 98 % | SYSTOLIC BLOOD PRESSURE: 122 MMHG | RESPIRATION RATE: 18 BRPM | TEMPERATURE: 97.3 F

## 2020-01-01 VITALS — DIASTOLIC BLOOD PRESSURE: 70 MMHG | TEMPERATURE: 98.2 F | SYSTOLIC BLOOD PRESSURE: 125 MMHG | RESPIRATION RATE: 18 BRPM

## 2020-01-01 VITALS
DIASTOLIC BLOOD PRESSURE: 70 MMHG | TEMPERATURE: 98 F | RESPIRATION RATE: 17 BRPM | HEART RATE: 70 BPM | OXYGEN SATURATION: 98 % | SYSTOLIC BLOOD PRESSURE: 123 MMHG

## 2020-01-01 VITALS
RESPIRATION RATE: 18 BRPM | OXYGEN SATURATION: 95 % | TEMPERATURE: 97.6 F | SYSTOLIC BLOOD PRESSURE: 132 MMHG | DIASTOLIC BLOOD PRESSURE: 78 MMHG | HEART RATE: 64 BPM

## 2020-01-01 VITALS
SYSTOLIC BLOOD PRESSURE: 94 MMHG | HEART RATE: 76 BPM | DIASTOLIC BLOOD PRESSURE: 64 MMHG | TEMPERATURE: 98.2 F | OXYGEN SATURATION: 100 %

## 2020-01-01 VITALS
HEART RATE: 78 BPM | OXYGEN SATURATION: 98 % | OXYGEN SATURATION: 98 % | TEMPERATURE: 97.8 F | HEART RATE: 76 BPM | SYSTOLIC BLOOD PRESSURE: 110 MMHG | RESPIRATION RATE: 18 BRPM | DIASTOLIC BLOOD PRESSURE: 80 MMHG | DIASTOLIC BLOOD PRESSURE: 62 MMHG | TEMPERATURE: 97.8 F | SYSTOLIC BLOOD PRESSURE: 132 MMHG | RESPIRATION RATE: 18 BRPM

## 2020-01-01 VITALS
DIASTOLIC BLOOD PRESSURE: 70 MMHG | HEART RATE: 76 BPM | RESPIRATION RATE: 18 BRPM | TEMPERATURE: 98.6 F | OXYGEN SATURATION: 96 % | SYSTOLIC BLOOD PRESSURE: 132 MMHG

## 2020-01-01 VITALS
RESPIRATION RATE: 17 BRPM | DIASTOLIC BLOOD PRESSURE: 62 MMHG | SYSTOLIC BLOOD PRESSURE: 113 MMHG | OXYGEN SATURATION: 99 % | HEART RATE: 78 BPM | TEMPERATURE: 97.8 F

## 2020-01-01 VITALS
HEART RATE: 70 BPM | RESPIRATION RATE: 17 BRPM | TEMPERATURE: 98 F | SYSTOLIC BLOOD PRESSURE: 124 MMHG | DIASTOLIC BLOOD PRESSURE: 67 MMHG | OXYGEN SATURATION: 98 %

## 2020-01-01 VITALS
RESPIRATION RATE: 18 BRPM | SYSTOLIC BLOOD PRESSURE: 110 MMHG | TEMPERATURE: 98.1 F | OXYGEN SATURATION: 98 % | DIASTOLIC BLOOD PRESSURE: 70 MMHG | HEART RATE: 77 BPM

## 2020-01-01 VITALS — OXYGEN SATURATION: 94 % | TEMPERATURE: 98.2 F | HEART RATE: 81 BPM

## 2020-01-01 VITALS
HEART RATE: 95 BPM | TEMPERATURE: 98.3 F | DIASTOLIC BLOOD PRESSURE: 74 MMHG | RESPIRATION RATE: 16 BRPM | SYSTOLIC BLOOD PRESSURE: 126 MMHG | OXYGEN SATURATION: 98 %

## 2020-01-01 VITALS
SYSTOLIC BLOOD PRESSURE: 120 MMHG | RESPIRATION RATE: 17 BRPM | TEMPERATURE: 98 F | HEART RATE: 70 BPM | DIASTOLIC BLOOD PRESSURE: 65 MMHG | OXYGEN SATURATION: 98 %

## 2020-01-01 VITALS
HEIGHT: 61 IN | OXYGEN SATURATION: 99 % | DIASTOLIC BLOOD PRESSURE: 54 MMHG | BODY MASS INDEX: 24.17 KG/M2 | RESPIRATION RATE: 16 BRPM | TEMPERATURE: 97.2 F | WEIGHT: 128 LBS | SYSTOLIC BLOOD PRESSURE: 112 MMHG | HEART RATE: 77 BPM

## 2020-01-01 VITALS
TEMPERATURE: 98 F | RESPIRATION RATE: 17 BRPM | SYSTOLIC BLOOD PRESSURE: 120 MMHG | OXYGEN SATURATION: 98 % | HEART RATE: 72 BPM | DIASTOLIC BLOOD PRESSURE: 67 MMHG

## 2020-01-01 VITALS
TEMPERATURE: 98 F | SYSTOLIC BLOOD PRESSURE: 100 MMHG | RESPIRATION RATE: 17 BRPM | OXYGEN SATURATION: 98 % | DIASTOLIC BLOOD PRESSURE: 62 MMHG | HEART RATE: 70 BPM

## 2020-01-01 VITALS — OXYGEN SATURATION: 97 % | TEMPERATURE: 98.2 F | HEART RATE: 82 BPM

## 2020-01-01 VITALS
DIASTOLIC BLOOD PRESSURE: 60 MMHG | TEMPERATURE: 97.4 F | OXYGEN SATURATION: 98 % | RESPIRATION RATE: 18 BRPM | SYSTOLIC BLOOD PRESSURE: 118 MMHG | HEART RATE: 78 BPM

## 2020-01-01 VITALS
DIASTOLIC BLOOD PRESSURE: 56 MMHG | HEART RATE: 77 BPM | WEIGHT: 135.36 LBS | OXYGEN SATURATION: 98 % | RESPIRATION RATE: 16 BRPM | SYSTOLIC BLOOD PRESSURE: 117 MMHG | TEMPERATURE: 98.6 F | BODY MASS INDEX: 22.55 KG/M2 | HEIGHT: 65 IN

## 2020-01-01 VITALS
TEMPERATURE: 98.9 F | DIASTOLIC BLOOD PRESSURE: 67 MMHG | OXYGEN SATURATION: 97 % | SYSTOLIC BLOOD PRESSURE: 132 MMHG | HEART RATE: 76 BPM | RESPIRATION RATE: 17 BRPM

## 2020-01-01 VITALS
HEART RATE: 75 BPM | DIASTOLIC BLOOD PRESSURE: 60 MMHG | TEMPERATURE: 97.4 F | RESPIRATION RATE: 16 BRPM | SYSTOLIC BLOOD PRESSURE: 110 MMHG | OXYGEN SATURATION: 96 %

## 2020-01-01 VITALS — RESPIRATION RATE: 18 BRPM | OXYGEN SATURATION: 97 % | HEART RATE: 79 BPM | TEMPERATURE: 98 F

## 2020-01-01 VITALS
SYSTOLIC BLOOD PRESSURE: 132 MMHG | RESPIRATION RATE: 16 BRPM | HEART RATE: 76 BPM | OXYGEN SATURATION: 97 % | TEMPERATURE: 98.6 F | DIASTOLIC BLOOD PRESSURE: 74 MMHG

## 2020-01-01 VITALS
SYSTOLIC BLOOD PRESSURE: 128 MMHG | RESPIRATION RATE: 16 BRPM | DIASTOLIC BLOOD PRESSURE: 68 MMHG | HEART RATE: 70 BPM | TEMPERATURE: 97.7 F | OXYGEN SATURATION: 99 %

## 2020-01-01 VITALS
TEMPERATURE: 98.6 F | OXYGEN SATURATION: 97 % | HEART RATE: 74 BPM | DIASTOLIC BLOOD PRESSURE: 52 MMHG | SYSTOLIC BLOOD PRESSURE: 110 MMHG | RESPIRATION RATE: 18 BRPM

## 2020-01-01 VITALS — DIASTOLIC BLOOD PRESSURE: 60 MMHG | OXYGEN SATURATION: 98 % | HEART RATE: 68 BPM | SYSTOLIC BLOOD PRESSURE: 120 MMHG

## 2020-01-01 VITALS
OXYGEN SATURATION: 96 % | HEART RATE: 92 BPM | SYSTOLIC BLOOD PRESSURE: 129 MMHG | RESPIRATION RATE: 16 BRPM | TEMPERATURE: 98.7 F | DIASTOLIC BLOOD PRESSURE: 54 MMHG

## 2020-01-01 VITALS
HEIGHT: 65 IN | OXYGEN SATURATION: 98 % | TEMPERATURE: 98.5 F | WEIGHT: 131.6 LBS | SYSTOLIC BLOOD PRESSURE: 112 MMHG | RESPIRATION RATE: 18 BRPM | DIASTOLIC BLOOD PRESSURE: 50 MMHG | HEART RATE: 74 BPM | BODY MASS INDEX: 21.92 KG/M2

## 2020-01-01 VITALS
TEMPERATURE: 98 F | HEART RATE: 72 BPM | OXYGEN SATURATION: 98 % | DIASTOLIC BLOOD PRESSURE: 64 MMHG | RESPIRATION RATE: 17 BRPM | SYSTOLIC BLOOD PRESSURE: 120 MMHG

## 2020-01-01 VITALS — WEIGHT: 129 LBS | DIASTOLIC BLOOD PRESSURE: 69 MMHG | SYSTOLIC BLOOD PRESSURE: 137 MMHG | BODY MASS INDEX: 24.37 KG/M2

## 2020-01-01 DIAGNOSIS — I25.5 ISCHEMIC CARDIOMYOPATHY: ICD-10-CM

## 2020-01-01 DIAGNOSIS — J81.0 ACUTE PULMONARY EDEMA (HCC): Primary | ICD-10-CM

## 2020-01-01 DIAGNOSIS — I25.10 CORONARY ARTERY DISEASE INVOLVING NATIVE CORONARY ARTERY, ANGINA PRESENCE UNSPECIFIED, UNSPECIFIED WHETHER NATIVE OR TRANSPLANTED HEART: ICD-10-CM

## 2020-01-01 DIAGNOSIS — N18.30 CKD (CHRONIC KIDNEY DISEASE) STAGE 3, GFR 30-59 ML/MIN (HCC): ICD-10-CM

## 2020-01-01 DIAGNOSIS — I25.10 CORONARY ARTERY DISEASE INVOLVING NATIVE CORONARY ARTERY OF NATIVE HEART WITHOUT ANGINA PECTORIS: Primary | ICD-10-CM

## 2020-01-01 DIAGNOSIS — R78.81 BACTEREMIA: ICD-10-CM

## 2020-01-01 DIAGNOSIS — L98.499 ULCER OF SKIN OF BREAST (HCC): Primary | ICD-10-CM

## 2020-01-01 DIAGNOSIS — N39.0 RECURRENT UTI: ICD-10-CM

## 2020-01-01 DIAGNOSIS — I50.9 ACUTE ON CHRONIC CONGESTIVE HEART FAILURE, UNSPECIFIED HEART FAILURE TYPE (HCC): ICD-10-CM

## 2020-01-01 DIAGNOSIS — I10 ESSENTIAL HYPERTENSION: ICD-10-CM

## 2020-01-01 DIAGNOSIS — D64.9 ANEMIA, UNSPECIFIED TYPE: ICD-10-CM

## 2020-01-01 DIAGNOSIS — Z79.4 TYPE 2 DIABETES MELLITUS WITHOUT COMPLICATION, WITH LONG-TERM CURRENT USE OF INSULIN (HCC): Primary | ICD-10-CM

## 2020-01-01 DIAGNOSIS — U07.1 COVID-19: Primary | ICD-10-CM

## 2020-01-01 DIAGNOSIS — N39.0 URINARY TRACT INFECTION WITH HEMATURIA, SITE UNSPECIFIED: Primary | ICD-10-CM

## 2020-01-01 DIAGNOSIS — I25.10 CORONARY ARTERY DISEASE INVOLVING NATIVE CORONARY ARTERY, ANGINA PRESENCE UNSPECIFIED, UNSPECIFIED WHETHER NATIVE OR TRANSPLANTED HEART: Primary | ICD-10-CM

## 2020-01-01 DIAGNOSIS — R26.81 GAIT INSTABILITY: ICD-10-CM

## 2020-01-01 DIAGNOSIS — N18.9 CHRONIC RENAL IMPAIRMENT, UNSPECIFIED CKD STAGE: ICD-10-CM

## 2020-01-01 DIAGNOSIS — B96.1 BACTEREMIA DUE TO KLEBSIELLA PNEUMONIAE: ICD-10-CM

## 2020-01-01 DIAGNOSIS — R53.81 PHYSICAL DECONDITIONING: ICD-10-CM

## 2020-01-01 DIAGNOSIS — C50.919 MALIGNANT NEOPLASM OF FEMALE BREAST, UNSPECIFIED ESTROGEN RECEPTOR STATUS, UNSPECIFIED LATERALITY, UNSPECIFIED SITE OF BREAST (HCC): ICD-10-CM

## 2020-01-01 DIAGNOSIS — N30.00 ACUTE CYSTITIS WITHOUT HEMATURIA: Primary | ICD-10-CM

## 2020-01-01 DIAGNOSIS — E11.65 TYPE 2 DIABETES MELLITUS WITH HYPERGLYCEMIA, WITHOUT LONG-TERM CURRENT USE OF INSULIN (HCC): ICD-10-CM

## 2020-01-01 DIAGNOSIS — R35.0 URINARY FREQUENCY: Primary | ICD-10-CM

## 2020-01-01 DIAGNOSIS — M05.79 SEROPOSITIVE RHEUMATOID ARTHRITIS OF MULTIPLE SITES (HCC): ICD-10-CM

## 2020-01-01 DIAGNOSIS — E11.9 TYPE 2 DIABETES MELLITUS WITHOUT COMPLICATION, WITH LONG-TERM CURRENT USE OF INSULIN (HCC): ICD-10-CM

## 2020-01-01 DIAGNOSIS — Z79.60 LONG-TERM USE OF IMMUNOSUPPRESSANT MEDICATION: ICD-10-CM

## 2020-01-01 DIAGNOSIS — Z91.81 AT RISK FOR FALLS: ICD-10-CM

## 2020-01-01 DIAGNOSIS — Z78.9 STATIN INTOLERANCE: ICD-10-CM

## 2020-01-01 DIAGNOSIS — R65.21 SEPTIC SHOCK (HCC): ICD-10-CM

## 2020-01-01 DIAGNOSIS — Z87.898 HISTORY OF FEVER: ICD-10-CM

## 2020-01-01 DIAGNOSIS — U07.1 COVID-19: ICD-10-CM

## 2020-01-01 DIAGNOSIS — R31.9 URINARY TRACT INFECTION WITH HEMATURIA, SITE UNSPECIFIED: Primary | ICD-10-CM

## 2020-01-01 DIAGNOSIS — N39.0 URINARY TRACT INFECTION WITHOUT HEMATURIA, SITE UNSPECIFIED: ICD-10-CM

## 2020-01-01 DIAGNOSIS — E78.2 MIXED HYPERLIPIDEMIA: ICD-10-CM

## 2020-01-01 DIAGNOSIS — E11.9 TYPE 2 DIABETES MELLITUS WITHOUT COMPLICATION, WITH LONG-TERM CURRENT USE OF INSULIN (HCC): Primary | ICD-10-CM

## 2020-01-01 DIAGNOSIS — I25.10 CORONARY ARTERY DISEASE INVOLVING NATIVE HEART WITHOUT ANGINA PECTORIS, UNSPECIFIED VESSEL OR LESION TYPE: ICD-10-CM

## 2020-01-01 DIAGNOSIS — M81.0 AGE-RELATED OSTEOPOROSIS WITHOUT CURRENT PATHOLOGICAL FRACTURE: ICD-10-CM

## 2020-01-01 DIAGNOSIS — I50.23 ACUTE ON CHRONIC SYSTOLIC HEART FAILURE (HCC): ICD-10-CM

## 2020-01-01 DIAGNOSIS — N30.00 ACUTE CYSTITIS WITHOUT HEMATURIA: ICD-10-CM

## 2020-01-01 DIAGNOSIS — Z09 HOSPITAL DISCHARGE FOLLOW-UP: Primary | ICD-10-CM

## 2020-01-01 DIAGNOSIS — I35.0 NONRHEUMATIC AORTIC VALVE STENOSIS: ICD-10-CM

## 2020-01-01 DIAGNOSIS — Z76.89 ENCOUNTER FOR SUPPORT AND COORDINATION OF TRANSITION OF CARE: Primary | ICD-10-CM

## 2020-01-01 DIAGNOSIS — I10 HTN, GOAL BELOW 140/90: ICD-10-CM

## 2020-01-01 DIAGNOSIS — R50.9 FEVER, UNSPECIFIED FEVER CAUSE: ICD-10-CM

## 2020-01-01 DIAGNOSIS — I25.5 ISCHEMIC CARDIOMYOPATHY: Primary | ICD-10-CM

## 2020-01-01 DIAGNOSIS — N39.0 RECURRENT UTI: Primary | ICD-10-CM

## 2020-01-01 DIAGNOSIS — D72.829 LEUKOCYTOSIS, UNSPECIFIED TYPE: ICD-10-CM

## 2020-01-01 DIAGNOSIS — R06.00 DYSPNEA, UNSPECIFIED TYPE: ICD-10-CM

## 2020-01-01 DIAGNOSIS — R53.83 FATIGUE, UNSPECIFIED TYPE: ICD-10-CM

## 2020-01-01 DIAGNOSIS — C78.7 HEPATIC METASTASIS (HCC): ICD-10-CM

## 2020-01-01 DIAGNOSIS — Z79.4 TYPE 2 DIABETES MELLITUS WITHOUT COMPLICATION, WITH LONG-TERM CURRENT USE OF INSULIN (HCC): ICD-10-CM

## 2020-01-01 DIAGNOSIS — R53.83 MALAISE AND FATIGUE: ICD-10-CM

## 2020-01-01 DIAGNOSIS — R77.8 ELEVATED TROPONIN: ICD-10-CM

## 2020-01-01 DIAGNOSIS — R50.9 FEVER, UNSPECIFIED FEVER CAUSE: Primary | ICD-10-CM

## 2020-01-01 DIAGNOSIS — Z20.822 EXPOSURE TO COVID-19 VIRUS: Primary | ICD-10-CM

## 2020-01-01 DIAGNOSIS — R06.02 SOB (SHORTNESS OF BREATH): ICD-10-CM

## 2020-01-01 DIAGNOSIS — N39.0 URINARY TRACT INFECTION WITHOUT HEMATURIA, SITE UNSPECIFIED: Primary | ICD-10-CM

## 2020-01-01 DIAGNOSIS — R09.02 HYPOXIA: ICD-10-CM

## 2020-01-01 DIAGNOSIS — A41.9 SEPTIC SHOCK (HCC): ICD-10-CM

## 2020-01-01 DIAGNOSIS — A49.8 INFECTION, KLEBSIELLA: Primary | ICD-10-CM

## 2020-01-01 DIAGNOSIS — C50.919 METASTATIC BREAST CANCER (HCC): ICD-10-CM

## 2020-01-01 DIAGNOSIS — R78.81 BACTEREMIA DUE TO KLEBSIELLA PNEUMONIAE: ICD-10-CM

## 2020-01-01 DIAGNOSIS — I10 ESSENTIAL HYPERTENSION: Primary | ICD-10-CM

## 2020-01-01 DIAGNOSIS — M05.79 SEROPOSITIVE RHEUMATOID ARTHRITIS OF MULTIPLE SITES (HCC): Primary | ICD-10-CM

## 2020-01-01 DIAGNOSIS — I25.10 CORONARY ARTERY DISEASE INVOLVING NATIVE CORONARY ARTERY OF NATIVE HEART WITHOUT ANGINA PECTORIS: ICD-10-CM

## 2020-01-01 DIAGNOSIS — R53.81 MALAISE AND FATIGUE: ICD-10-CM

## 2020-01-01 DIAGNOSIS — I50.23 ACUTE ON CHRONIC SYSTOLIC HF (HEART FAILURE) (HCC): ICD-10-CM

## 2020-01-01 DIAGNOSIS — I31.39 PERICARDIAL EFFUSION: ICD-10-CM

## 2020-01-01 DIAGNOSIS — B35.1 TOENAIL FUNGUS: ICD-10-CM

## 2020-01-01 LAB
ABO + RH BLD: NORMAL
ALBUMIN SERPL-MCNC: 2.1 G/DL (ref 3.5–5)
ALBUMIN SERPL-MCNC: 2.3 G/DL (ref 3.5–5)
ALBUMIN SERPL-MCNC: 2.5 G/DL (ref 3.5–5)
ALBUMIN SERPL-MCNC: 2.5 G/DL (ref 3.5–5)
ALBUMIN SERPL-MCNC: 2.7 G/DL (ref 3.5–5)
ALBUMIN SERPL-MCNC: 2.9 G/DL (ref 3.5–5)
ALBUMIN SERPL-MCNC: 3 G/DL (ref 3.5–5)
ALBUMIN SERPL-MCNC: 3 G/DL (ref 3.6–4.6)
ALBUMIN/GLOB SERPL: 0.5 {RATIO} (ref 1.1–2.2)
ALBUMIN/GLOB SERPL: 0.6 {RATIO} (ref 1.1–2.2)
ALBUMIN/GLOB SERPL: 0.7 {RATIO} (ref 1.1–2.2)
ALBUMIN/GLOB SERPL: 0.9 {RATIO} (ref 1.1–2.2)
ALBUMIN/GLOB SERPL: 1.3 {RATIO} (ref 1.2–2.2)
ALP SERPL-CCNC: 117 U/L (ref 45–117)
ALP SERPL-CCNC: 122 U/L (ref 45–117)
ALP SERPL-CCNC: 134 U/L (ref 45–117)
ALP SERPL-CCNC: 143 U/L (ref 45–117)
ALP SERPL-CCNC: 177 U/L (ref 45–117)
ALP SERPL-CCNC: 196 U/L (ref 45–117)
ALP SERPL-CCNC: 339 U/L (ref 45–117)
ALP SERPL-CCNC: 350 U/L (ref 45–117)
ALP SERPL-CCNC: 392 U/L (ref 45–117)
ALP SERPL-CCNC: 412 U/L (ref 45–117)
ALP SERPL-CCNC: 99 IU/L (ref 39–117)
ALT SERPL-CCNC: 10 IU/L (ref 0–32)
ALT SERPL-CCNC: 191 U/L (ref 12–78)
ALT SERPL-CCNC: 23 U/L (ref 12–78)
ALT SERPL-CCNC: 27 U/L (ref 12–78)
ALT SERPL-CCNC: 29 U/L (ref 12–78)
ALT SERPL-CCNC: 29 U/L (ref 12–78)
ALT SERPL-CCNC: 30 U/L (ref 12–78)
ALT SERPL-CCNC: 31 U/L (ref 12–78)
ALT SERPL-CCNC: 31 U/L (ref 12–78)
ALT SERPL-CCNC: 32 U/L (ref 12–78)
ALT SERPL-CCNC: 34 U/L (ref 12–78)
AMMONIA PLAS-SCNC: 34 UMOL/L
ANION GAP SERPL CALC-SCNC: 10 MMOL/L (ref 5–15)
ANION GAP SERPL CALC-SCNC: 11 MMOL/L (ref 5–15)
ANION GAP SERPL CALC-SCNC: 12 MMOL/L (ref 5–15)
ANION GAP SERPL CALC-SCNC: 5 MMOL/L (ref 5–15)
ANION GAP SERPL CALC-SCNC: 5 MMOL/L (ref 5–15)
ANION GAP SERPL CALC-SCNC: 6 MMOL/L (ref 5–15)
ANION GAP SERPL CALC-SCNC: 7 MMOL/L (ref 5–15)
ANION GAP SERPL CALC-SCNC: 7 MMOL/L (ref 5–15)
ANION GAP SERPL CALC-SCNC: 8 MMOL/L (ref 5–15)
ANION GAP SERPL CALC-SCNC: 9 MMOL/L (ref 5–15)
ANION GAP SERPL CALC-SCNC: ABNORMAL MMOL/L (ref 5–15)
APPEARANCE UR: ABNORMAL
APPEARANCE UR: CLEAR
APPEARANCE UR: CLEAR
APTT PPP: 31.7 SEC (ref 22.1–32)
APTT PPP: 32.5 SEC (ref 22.1–32)
ARTERIAL PATENCY WRIST A: NO
ARTERIAL PATENCY WRIST A: YES
ARTERIAL PATENCY WRIST A: YES
AST SERPL-CCNC: 111 U/L (ref 15–37)
AST SERPL-CCNC: 14 IU/L (ref 0–40)
AST SERPL-CCNC: 15 U/L (ref 15–37)
AST SERPL-CCNC: 20 U/L (ref 15–37)
AST SERPL-CCNC: 21 U/L (ref 15–37)
AST SERPL-CCNC: 29 U/L (ref 15–37)
AST SERPL-CCNC: 35 U/L (ref 15–37)
AST SERPL-CCNC: 363 U/L (ref 15–37)
AST SERPL-CCNC: 45 U/L (ref 15–37)
AST SERPL-CCNC: 76 U/L (ref 15–37)
AST SERPL-CCNC: 91 U/L (ref 15–37)
ATRIAL RATE: 105 BPM
ATRIAL RATE: 113 BPM
ATRIAL RATE: 115 BPM
ATRIAL RATE: 116 BPM
ATRIAL RATE: 126 BPM
ATRIAL RATE: 159 BPM
ATRIAL RATE: 91 BPM
ATRIAL RATE: 92 BPM
ATRIAL RATE: 92 BPM
ATRIAL RATE: 93 BPM
ATRIAL RATE: 96 BPM
ATRIAL RATE: 97 BPM
ATRIAL RATE: 98 BPM
BACTERIA #/AREA URNS HPF: ABNORMAL /[HPF]
BACTERIA SPEC CULT: ABNORMAL
BACTERIA SPEC CULT: NORMAL
BACTERIA UR CULT: NO GROWTH
BACTERIA UR CULT: NORMAL
BACTERIA URNS QL MICRO: ABNORMAL /HPF
BACTERIA URNS QL MICRO: NEGATIVE /HPF
BASE DEFICIT BLD-SCNC: 1 MMOL/L
BASE DEFICIT BLD-SCNC: 10 MMOL/L
BASE DEFICIT BLD-SCNC: 11 MMOL/L
BASOPHILS # BLD AUTO: 0.1 X10E3/UL (ref 0–0.2)
BASOPHILS # BLD: 0 K/UL (ref 0–0.1)
BASOPHILS # BLD: 0.1 K/UL (ref 0–0.1)
BASOPHILS # BLD: 0.2 K/UL (ref 0–0.1)
BASOPHILS # BLD: 0.3 K/UL (ref 0–0.1)
BASOPHILS # BLD: 0.3 K/UL (ref 0–0.1)
BASOPHILS NFR BLD AUTO: 1 %
BASOPHILS NFR BLD: 0 % (ref 0–1)
BASOPHILS NFR BLD: 1 % (ref 0–1)
BASOPHILS NFR BLD: 2 % (ref 0–1)
BDY SITE: ABNORMAL
BILIRUB SERPL-MCNC: 0.3 MG/DL (ref 0.2–1)
BILIRUB SERPL-MCNC: 0.4 MG/DL (ref 0.2–1)
BILIRUB SERPL-MCNC: 0.5 MG/DL (ref 0.2–1)
BILIRUB SERPL-MCNC: 0.6 MG/DL (ref 0.2–1)
BILIRUB SERPL-MCNC: 0.6 MG/DL (ref 0.2–1)
BILIRUB SERPL-MCNC: 0.7 MG/DL (ref 0.2–1)
BILIRUB SERPL-MCNC: 0.7 MG/DL (ref 0.2–1)
BILIRUB SERPL-MCNC: <0.2 MG/DL (ref 0–1.2)
BILIRUB UR QL STRIP: NEGATIVE
BILIRUB UR QL: NEGATIVE
BLD PROD TYP BPU: NORMAL
BLOOD GROUP ANTIBODIES SERPL: NORMAL
BNP SERPL-MCNC: 4166 PG/ML
BNP SERPL-MCNC: 6052 PG/ML
BNP SERPL-MCNC: ABNORMAL PG/ML
BPU ID: NORMAL
BUN SERPL-MCNC: 26 MG/DL (ref 8–27)
BUN SERPL-MCNC: 28 MG/DL (ref 6–20)
BUN SERPL-MCNC: 28 MG/DL (ref 6–20)
BUN SERPL-MCNC: 30 MG/DL (ref 6–20)
BUN SERPL-MCNC: 30 MG/DL (ref 6–20)
BUN SERPL-MCNC: 31 MG/DL (ref 6–20)
BUN SERPL-MCNC: 31 MG/DL (ref 6–20)
BUN SERPL-MCNC: 33 MG/DL (ref 6–20)
BUN SERPL-MCNC: 37 MG/DL (ref 6–20)
BUN SERPL-MCNC: 37 MG/DL (ref 6–20)
BUN SERPL-MCNC: 38 MG/DL (ref 6–20)
BUN SERPL-MCNC: 38 MG/DL (ref 6–20)
BUN SERPL-MCNC: 39 MG/DL (ref 6–20)
BUN SERPL-MCNC: 40 MG/DL (ref 6–20)
BUN SERPL-MCNC: 40 MG/DL (ref 6–20)
BUN SERPL-MCNC: 41 MG/DL (ref 6–20)
BUN SERPL-MCNC: 42 MG/DL (ref 6–20)
BUN SERPL-MCNC: 45 MG/DL (ref 6–20)
BUN SERPL-MCNC: 45 MG/DL (ref 6–20)
BUN SERPL-MCNC: 47 MG/DL (ref 6–20)
BUN SERPL-MCNC: 49 MG/DL (ref 6–20)
BUN SERPL-MCNC: 51 MG/DL (ref 6–20)
BUN SERPL-MCNC: 52 MG/DL (ref 6–20)
BUN SERPL-MCNC: 54 MG/DL (ref 6–20)
BUN SERPL-MCNC: 63 MG/DL (ref 6–20)
BUN SERPL-MCNC: 63 MG/DL (ref 6–20)
BUN SERPL-MCNC: 66 MG/DL (ref 6–20)
BUN SERPL-MCNC: 68 MG/DL (ref 6–20)
BUN SERPL-MCNC: 68 MG/DL (ref 6–20)
BUN SERPL-MCNC: 73 MG/DL (ref 6–20)
BUN/CREAT SERPL: 23 (ref 12–20)
BUN/CREAT SERPL: 23 (ref 12–20)
BUN/CREAT SERPL: 25 (ref 12–20)
BUN/CREAT SERPL: 26 (ref 12–20)
BUN/CREAT SERPL: 27 (ref 12–20)
BUN/CREAT SERPL: 28 (ref 12–20)
BUN/CREAT SERPL: 29 (ref 12–20)
BUN/CREAT SERPL: 29 (ref 12–20)
BUN/CREAT SERPL: 30 (ref 12–20)
BUN/CREAT SERPL: 30 (ref 12–20)
BUN/CREAT SERPL: 31 (ref 12–20)
BUN/CREAT SERPL: 32 (ref 12–20)
BUN/CREAT SERPL: 33 (ref 12–20)
BUN/CREAT SERPL: 36 (ref 12–20)
BUN/CREAT SERPL: 38 (ref 12–20)
BUN/CREAT SERPL: 47 (ref 12–28)
CA-I BLD-SCNC: 1.12 MMOL/L (ref 1.12–1.32)
CA-I BLD-SCNC: 1.16 MMOL/L (ref 1.12–1.32)
CA-I BLD-SCNC: 1.23 MMOL/L (ref 1.12–1.32)
CALCIUM SERPL-MCNC: 6.9 MG/DL (ref 8.5–10.1)
CALCIUM SERPL-MCNC: 7.1 MG/DL (ref 8.5–10.1)
CALCIUM SERPL-MCNC: 7.3 MG/DL (ref 8.5–10.1)
CALCIUM SERPL-MCNC: 7.3 MG/DL (ref 8.5–10.1)
CALCIUM SERPL-MCNC: 7.4 MG/DL (ref 8.5–10.1)
CALCIUM SERPL-MCNC: 7.4 MG/DL (ref 8.5–10.1)
CALCIUM SERPL-MCNC: 7.5 MG/DL (ref 8.5–10.1)
CALCIUM SERPL-MCNC: 7.5 MG/DL (ref 8.7–10.3)
CALCIUM SERPL-MCNC: 7.6 MG/DL (ref 8.5–10.1)
CALCIUM SERPL-MCNC: 7.9 MG/DL (ref 8.5–10.1)
CALCIUM SERPL-MCNC: 7.9 MG/DL (ref 8.5–10.1)
CALCIUM SERPL-MCNC: 8.1 MG/DL (ref 8.5–10.1)
CALCIUM SERPL-MCNC: 8.1 MG/DL (ref 8.5–10.1)
CALCIUM SERPL-MCNC: 8.2 MG/DL (ref 8.5–10.1)
CALCIUM SERPL-MCNC: 8.2 MG/DL (ref 8.5–10.1)
CALCIUM SERPL-MCNC: 8.3 MG/DL (ref 8.5–10.1)
CALCIUM SERPL-MCNC: 8.4 MG/DL (ref 8.5–10.1)
CALCIUM SERPL-MCNC: 8.5 MG/DL (ref 8.5–10.1)
CALCIUM SERPL-MCNC: 8.6 MG/DL (ref 8.5–10.1)
CALCIUM SERPL-MCNC: 8.6 MG/DL (ref 8.5–10.1)
CALCIUM SERPL-MCNC: 8.7 MG/DL (ref 8.5–10.1)
CALCIUM SERPL-MCNC: 8.9 MG/DL (ref 8.5–10.1)
CALCIUM SERPL-MCNC: 8.9 MG/DL (ref 8.5–10.1)
CALCIUM SERPL-MCNC: 9 MG/DL (ref 8.5–10.1)
CALCIUM SERPL-MCNC: 9 MG/DL (ref 8.5–10.1)
CALCIUM SERPL-MCNC: 9.1 MG/DL (ref 8.5–10.1)
CALCIUM SERPL-MCNC: 9.3 MG/DL (ref 8.5–10.1)
CALCIUM SERPL-MCNC: 9.4 MG/DL (ref 8.5–10.1)
CALCIUM SERPL-MCNC: 9.5 MG/DL (ref 8.5–10.1)
CALCULATED P AXIS, ECG09: 39 DEGREES
CALCULATED P AXIS, ECG09: 41 DEGREES
CALCULATED P AXIS, ECG09: 48 DEGREES
CALCULATED P AXIS, ECG09: 52 DEGREES
CALCULATED P AXIS, ECG09: 52 DEGREES
CALCULATED P AXIS, ECG09: 54 DEGREES
CALCULATED P AXIS, ECG09: 56 DEGREES
CALCULATED P AXIS, ECG09: 61 DEGREES
CALCULATED P AXIS, ECG09: 62 DEGREES
CALCULATED P AXIS, ECG09: 63 DEGREES
CALCULATED P AXIS, ECG09: 63 DEGREES
CALCULATED P AXIS, ECG09: 67 DEGREES
CALCULATED R AXIS, ECG10: -3 DEGREES
CALCULATED R AXIS, ECG10: 100 DEGREES
CALCULATED R AXIS, ECG10: 111 DEGREES
CALCULATED R AXIS, ECG10: 112 DEGREES
CALCULATED R AXIS, ECG10: 23 DEGREES
CALCULATED R AXIS, ECG10: 27 DEGREES
CALCULATED R AXIS, ECG10: 29 DEGREES
CALCULATED R AXIS, ECG10: 32 DEGREES
CALCULATED R AXIS, ECG10: 49 DEGREES
CALCULATED R AXIS, ECG10: 49 DEGREES
CALCULATED R AXIS, ECG10: 60 DEGREES
CALCULATED R AXIS, ECG10: 72 DEGREES
CALCULATED R AXIS, ECG10: 84 DEGREES
CALCULATED T AXIS, ECG11: 114 DEGREES
CALCULATED T AXIS, ECG11: 138 DEGREES
CALCULATED T AXIS, ECG11: 141 DEGREES
CALCULATED T AXIS, ECG11: 141 DEGREES
CALCULATED T AXIS, ECG11: 143 DEGREES
CALCULATED T AXIS, ECG11: 144 DEGREES
CALCULATED T AXIS, ECG11: 147 DEGREES
CALCULATED T AXIS, ECG11: 148 DEGREES
CALCULATED T AXIS, ECG11: 148 DEGREES
CALCULATED T AXIS, ECG11: 153 DEGREES
CALCULATED T AXIS, ECG11: 156 DEGREES
CALCULATED T AXIS, ECG11: 164 DEGREES
CALCULATED T AXIS, ECG11: 169 DEGREES
CASTS URNS QL MICRO: ABNORMAL /LPF
CC UR VC: ABNORMAL
CHLORIDE SERPL-SCNC: 100 MMOL/L (ref 97–108)
CHLORIDE SERPL-SCNC: 101 MMOL/L (ref 97–108)
CHLORIDE SERPL-SCNC: 101 MMOL/L (ref 97–108)
CHLORIDE SERPL-SCNC: 102 MMOL/L (ref 97–108)
CHLORIDE SERPL-SCNC: 103 MMOL/L (ref 97–108)
CHLORIDE SERPL-SCNC: 104 MMOL/L (ref 96–106)
CHLORIDE SERPL-SCNC: 104 MMOL/L (ref 97–108)
CHLORIDE SERPL-SCNC: 105 MMOL/L (ref 97–108)
CHLORIDE SERPL-SCNC: 106 MMOL/L (ref 97–108)
CHLORIDE SERPL-SCNC: 107 MMOL/L (ref 97–108)
CHLORIDE SERPL-SCNC: 98 MMOL/L (ref 97–108)
CHLORIDE SERPL-SCNC: 99 MMOL/L (ref 97–108)
CO2 SERPL-SCNC: 17 MMOL/L (ref 21–32)
CO2 SERPL-SCNC: 18 MMOL/L (ref 21–32)
CO2 SERPL-SCNC: 19 MMOL/L (ref 20–29)
CO2 SERPL-SCNC: 19 MMOL/L (ref 21–32)
CO2 SERPL-SCNC: 19 MMOL/L (ref 21–32)
CO2 SERPL-SCNC: 20 MMOL/L (ref 21–32)
CO2 SERPL-SCNC: 21 MMOL/L (ref 21–32)
CO2 SERPL-SCNC: 22 MMOL/L (ref 21–32)
CO2 SERPL-SCNC: 23 MMOL/L (ref 21–32)
CO2 SERPL-SCNC: 24 MMOL/L (ref 21–32)
CO2 SERPL-SCNC: 25 MMOL/L (ref 21–32)
CO2 SERPL-SCNC: 26 MMOL/L (ref 21–32)
CO2 SERPL-SCNC: 27 MMOL/L (ref 21–32)
CO2 SERPL-SCNC: 29 MMOL/L (ref 21–32)
CO2 SERPL-SCNC: 30 MMOL/L (ref 21–32)
CO2 SERPL-SCNC: 30 MMOL/L (ref 21–32)
CO2 SERPL-SCNC: 31 MMOL/L (ref 21–32)
CO2 SERPL-SCNC: ABNORMAL MMOL/L (ref 21–32)
COLOR UR: ABNORMAL
COLOR UR: YELLOW
COLOR UR: YELLOW
COMMENT, HOLDF: NORMAL
CORTIS SERPL-MCNC: 23.5 UG/DL
CREAT SERPL-MCNC: 0.55 MG/DL (ref 0.57–1)
CREAT SERPL-MCNC: 1.11 MG/DL (ref 0.55–1.02)
CREAT SERPL-MCNC: 1.13 MG/DL (ref 0.55–1.02)
CREAT SERPL-MCNC: 1.14 MG/DL (ref 0.55–1.02)
CREAT SERPL-MCNC: 1.2 MG/DL (ref 0.55–1.02)
CREAT SERPL-MCNC: 1.2 MG/DL (ref 0.55–1.02)
CREAT SERPL-MCNC: 1.21 MG/DL (ref 0.55–1.02)
CREAT SERPL-MCNC: 1.22 MG/DL (ref 0.55–1.02)
CREAT SERPL-MCNC: 1.23 MG/DL (ref 0.55–1.02)
CREAT SERPL-MCNC: 1.23 MG/DL (ref 0.55–1.02)
CREAT SERPL-MCNC: 1.24 MG/DL (ref 0.55–1.02)
CREAT SERPL-MCNC: 1.24 MG/DL (ref 0.55–1.02)
CREAT SERPL-MCNC: 1.26 MG/DL (ref 0.55–1.02)
CREAT SERPL-MCNC: 1.31 MG/DL (ref 0.55–1.02)
CREAT SERPL-MCNC: 1.33 MG/DL (ref 0.55–1.02)
CREAT SERPL-MCNC: 1.35 MG/DL (ref 0.55–1.02)
CREAT SERPL-MCNC: 1.38 MG/DL (ref 0.55–1.02)
CREAT SERPL-MCNC: 1.4 MG/DL (ref 0.55–1.02)
CREAT SERPL-MCNC: 1.4 MG/DL (ref 0.55–1.02)
CREAT SERPL-MCNC: 1.42 MG/DL (ref 0.55–1.02)
CREAT SERPL-MCNC: 1.44 MG/DL (ref 0.55–1.02)
CREAT SERPL-MCNC: 1.48 MG/DL (ref 0.55–1.02)
CREAT SERPL-MCNC: 1.74 MG/DL (ref 0.55–1.02)
CREAT SERPL-MCNC: 1.83 MG/DL (ref 0.55–1.02)
CREAT SERPL-MCNC: 1.9 MG/DL (ref 0.55–1.02)
CREAT SERPL-MCNC: 1.94 MG/DL (ref 0.55–1.02)
CREAT SERPL-MCNC: 1.95 MG/DL (ref 0.55–1.02)
CREAT SERPL-MCNC: 1.97 MG/DL (ref 0.55–1.02)
CREAT SERPL-MCNC: 2.05 MG/DL (ref 0.55–1.02)
CREAT SERPL-MCNC: 2.24 MG/DL (ref 0.55–1.02)
CREAT SERPL-MCNC: 2.33 MG/DL (ref 0.55–1.02)
CREAT SERPL-MCNC: 2.59 MG/DL (ref 0.55–1.02)
CROSSMATCH RESULT,%XM: NORMAL
DIAGNOSIS, 93000: NORMAL
DIFFERENTIAL METHOD BLD: ABNORMAL
ECHO AO ROOT DIAM: 2.59 CM
ECHO AO ROOT DIAM: 2.73 CM
ECHO AV AREA PEAK VELOCITY: 0.63 CM2
ECHO AV AREA PEAK VELOCITY: 1 CM2
ECHO AV AREA PEAK VELOCITY: 1.13 CM2
ECHO AV AREA PEAK VELOCITY: 1.85 CM2
ECHO AV AREA PEAK VELOCITY: 2.09 CM2
ECHO AV AREA PLAN: 0.56 CM2
ECHO AV AREA VTI: 0.51 CM2
ECHO AV AREA VTI: 1.92 CM2
ECHO AV AREA/BSA PEAK VELOCITY: 0.4 CM2/M2
ECHO AV AREA/BSA VTI: 0.3 CM2/M2
ECHO AV AREA/BSA VTI: 1.2 CM2/M2
ECHO AV MEAN GRADIENT: 12.76 MMHG
ECHO AV MEAN GRADIENT: 15.92 MMHG
ECHO AV PEAK GRADIENT: 19.06 MMHG
ECHO AV PEAK GRADIENT: 19.79 MMHG
ECHO AV PEAK GRADIENT: 24.43 MMHG
ECHO AV PEAK VELOCITY: 218.26 CM/S
ECHO AV PEAK VELOCITY: 222.42 CM/S
ECHO AV PEAK VELOCITY: 247.13 CM/S
ECHO AV VTI: 48.89 CM
ECHO AV VTI: 51.01 CM
ECHO EST RA PRESSURE: 15 MMHG
ECHO EST RA PRESSURE: 3 MMHG
ECHO LA AREA 4C: 16.13 CM2
ECHO LA AREA 4C: 18.26 CM2
ECHO LA MAJOR AXIS: 4.38 CM
ECHO LA MAJOR AXIS: 5.03 CM
ECHO LA MINOR AXIS: 2.72 CM
ECHO LA MINOR AXIS: 3.18 CM
ECHO LA VOL 2C: 32.09 ML (ref 22–52)
ECHO LA VOL 2C: 51.94 ML (ref 22–52)
ECHO LA VOL 4C: 39.93 ML (ref 22–52)
ECHO LA VOL 4C: 47.14 ML (ref 22–52)
ECHO LA VOL BP: 38.93 ML (ref 22–52)
ECHO LA VOL BP: 56.84 ML (ref 22–52)
ECHO LA VOL/BSA BIPLANE: 24.59 ML/M2 (ref 16–28)
ECHO LA VOL/BSA BIPLANE: 35.34 ML/M2 (ref 16–28)
ECHO LA VOLUME INDEX A2C: 20.27 ML/M2 (ref 16–28)
ECHO LA VOLUME INDEX A2C: 32.29 ML/M2 (ref 16–28)
ECHO LA VOLUME INDEX A4C: 25.22 ML/M2 (ref 16–28)
ECHO LA VOLUME INDEX A4C: 29.31 ML/M2 (ref 16–28)
ECHO LV E' SEPTAL VELOCITY: 5.29 CM/S
ECHO LV EDV A2C: 106.23 ML
ECHO LV EDV A4C: 93.88 ML
ECHO LV EDV BP: 102.09 ML (ref 56–104)
ECHO LV EDV INDEX A4C: 58.4 ML/M2
ECHO LV EDV INDEX BP: 63.5 ML/M2
ECHO LV EDV NDEX A2C: 66 ML/M2
ECHO LV EJECTION FRACTION A2C: 38 PERCENT
ECHO LV EJECTION FRACTION A4C: 24 PERCENT
ECHO LV EJECTION FRACTION BIPLANE: 31.6 PERCENT (ref 55–100)
ECHO LV ESV A2C: 66.15 ML
ECHO LV ESV A4C: 71.19 ML
ECHO LV ESV BP: 69.86 ML (ref 19–49)
ECHO LV ESV INDEX A2C: 41.1 ML/M2
ECHO LV ESV INDEX A4C: 44.3 ML/M2
ECHO LV ESV INDEX BP: 43.4 ML/M2
ECHO LV INTERNAL DIMENSION DIASTOLIC: 4.7 CM (ref 3.9–5.3)
ECHO LV INTERNAL DIMENSION DIASTOLIC: 4.75 CM (ref 3.9–5.3)
ECHO LV INTERNAL DIMENSION SYSTOLIC: 3.86 CM
ECHO LV INTERNAL DIMENSION SYSTOLIC: 4.06 CM
ECHO LV IVSD: 0.69 CM (ref 0.6–0.9)
ECHO LV IVSD: 0.73 CM (ref 0.6–0.9)
ECHO LV MASS 2D: 116.5 G (ref 67–162)
ECHO LV MASS 2D: 121.3 G (ref 67–162)
ECHO LV MASS INDEX 2D: 72.4 G/M2 (ref 43–95)
ECHO LV MASS INDEX 2D: 76.6 G/M2 (ref 43–95)
ECHO LV POSTERIOR WALL DIASTOLIC: 0.83 CM (ref 0.6–0.9)
ECHO LV POSTERIOR WALL DIASTOLIC: 0.86 CM (ref 0.6–0.9)
ECHO LVOT DIAM: 1.79 CM
ECHO LVOT DIAM: 1.85 CM
ECHO LVOT PEAK GRADIENT: 1.09 MMHG
ECHO LVOT PEAK GRADIENT: 13.12 MMHG
ECHO LVOT PEAK GRADIENT: 3.85 MMHG
ECHO LVOT PEAK VELOCITY: 181.11 CM/S
ECHO LVOT PEAK VELOCITY: 52.2 CM/S
ECHO LVOT PEAK VELOCITY: 98.05 CM/S
ECHO LVOT SV: 24.9 ML
ECHO LVOT SV: 98.1 ML
ECHO LVOT VTI: 38.84 CM
ECHO LVOT VTI: 9.28 CM
ECHO MV A VELOCITY: 111.89 CM/S
ECHO MV A VELOCITY: 98.48 CM/S
ECHO MV AREA PHT: 3.95 CM2
ECHO MV AREA PHT: 4.78 CM2
ECHO MV AREA PHT: 5.91 CM2
ECHO MV AREA VTI: 1.06 CM2
ECHO MV E DECELERATION TIME (DT): 0.13 S
ECHO MV E DECELERATION TIME (DT): 0.19 S
ECHO MV E VELOCITY: 110.9 CM/S
ECHO MV E VELOCITY: 96.22 CM/S
ECHO MV E/A RATIO: 0.86
ECHO MV E/A RATIO: 1.13
ECHO MV MAX VELOCITY: 140.43 CM/S
ECHO MV MEAN GRADIENT: 3.55 MMHG
ECHO MV PEAK GRADIENT: 7.89 MMHG
ECHO MV PRESSURE HALF TIME (PHT): 0.04 S
ECHO MV PRESSURE HALF TIME (PHT): 0.05 S
ECHO MV PRESSURE HALF TIME (PHT): 0.06 S
ECHO MV VTI: 23.37 CM
ECHO PV PEAK INSTANTANEOUS GRADIENT SYSTOLIC: 3.88 MMHG
ECHO PV PEAK INSTANTANEOUS GRADIENT SYSTOLIC: 6.34 MMHG
ECHO RIGHT VENTRICULAR SYSTOLIC PRESSURE (RVSP): 33.11 MMHG
ECHO RIGHT VENTRICULAR SYSTOLIC PRESSURE (RVSP): 48.25 MMHG
ECHO RV INTERNAL DIMENSION: 3.6 CM
ECHO RV INTERNAL DIMENSION: 3.9 CM
ECHO RV TAPSE: 1.35 CM (ref 1.5–2)
ECHO RV TAPSE: 1.43 CM (ref 1.5–2)
ECHO RV TAPSE: 2.62 CM (ref 1.5–2)
ECHO TV REGURGITANT MAX VELOCITY: 274.37 CM/S
ECHO TV REGURGITANT MAX VELOCITY: 288.29 CM/S
ECHO TV REGURGITANT PEAK GRADIENT: 30.11 MMHG
ECHO TV REGURGITANT PEAK GRADIENT: 33.25 MMHG
EOSINOPHIL # BLD AUTO: 0.5 X10E3/UL (ref 0–0.4)
EOSINOPHIL # BLD: 0 K/UL (ref 0–0.4)
EOSINOPHIL # BLD: 0.1 K/UL (ref 0–0.4)
EOSINOPHIL # BLD: 0.2 K/UL (ref 0–0.4)
EOSINOPHIL # BLD: 0.2 K/UL (ref 0–0.4)
EOSINOPHIL # BLD: 0.3 K/UL (ref 0–0.4)
EOSINOPHIL # BLD: 0.3 K/UL (ref 0–0.4)
EOSINOPHIL NFR BLD AUTO: 7 %
EOSINOPHIL NFR BLD: 0 % (ref 0–7)
EOSINOPHIL NFR BLD: 1 % (ref 0–7)
EOSINOPHIL NFR BLD: 2 % (ref 0–7)
EOSINOPHIL NFR BLD: 3 % (ref 0–7)
EOSINOPHIL NFR BLD: 4 % (ref 0–7)
EOSINOPHIL NFR BLD: 4 % (ref 0–7)
EOSINOPHIL NFR BLD: 5 % (ref 0–7)
EPI CELLS #/AREA URNS HPF: ABNORMAL /HPF (ref 0–10)
EPITH CASTS URNS QL MICRO: ABNORMAL /LPF
ERYTHROCYTE [DISTWIDTH] IN BLOOD BY AUTOMATED COUNT: 15.1 % (ref 11.5–14.5)
ERYTHROCYTE [DISTWIDTH] IN BLOOD BY AUTOMATED COUNT: 16 % (ref 11.5–14.5)
ERYTHROCYTE [DISTWIDTH] IN BLOOD BY AUTOMATED COUNT: 16.7 % (ref 11.5–14.5)
ERYTHROCYTE [DISTWIDTH] IN BLOOD BY AUTOMATED COUNT: 16.8 % (ref 11.7–15.4)
ERYTHROCYTE [DISTWIDTH] IN BLOOD BY AUTOMATED COUNT: 17.7 % (ref 11.5–14.5)
ERYTHROCYTE [DISTWIDTH] IN BLOOD BY AUTOMATED COUNT: 18.8 % (ref 11.5–14.5)
ERYTHROCYTE [DISTWIDTH] IN BLOOD BY AUTOMATED COUNT: 18.9 % (ref 11.5–14.5)
ERYTHROCYTE [DISTWIDTH] IN BLOOD BY AUTOMATED COUNT: 18.9 % (ref 11.5–14.5)
ERYTHROCYTE [DISTWIDTH] IN BLOOD BY AUTOMATED COUNT: 19.1 % (ref 11.5–14.5)
ERYTHROCYTE [DISTWIDTH] IN BLOOD BY AUTOMATED COUNT: 19.2 % (ref 11.5–14.5)
ERYTHROCYTE [DISTWIDTH] IN BLOOD BY AUTOMATED COUNT: 19.3 % (ref 11.5–14.5)
ERYTHROCYTE [DISTWIDTH] IN BLOOD BY AUTOMATED COUNT: 19.4 % (ref 11.5–14.5)
ERYTHROCYTE [DISTWIDTH] IN BLOOD BY AUTOMATED COUNT: 19.5 % (ref 11.5–14.5)
ERYTHROCYTE [DISTWIDTH] IN BLOOD BY AUTOMATED COUNT: 19.6 % (ref 11.5–14.5)
ERYTHROCYTE [DISTWIDTH] IN BLOOD BY AUTOMATED COUNT: 19.7 % (ref 11.5–14.5)
ERYTHROCYTE [DISTWIDTH] IN BLOOD BY AUTOMATED COUNT: 19.8 % (ref 11.5–14.5)
ERYTHROCYTE [DISTWIDTH] IN BLOOD BY AUTOMATED COUNT: 19.8 % (ref 11.5–14.5)
ERYTHROCYTE [DISTWIDTH] IN BLOOD BY AUTOMATED COUNT: 19.9 % (ref 11.5–14.5)
ERYTHROCYTE [DISTWIDTH] IN BLOOD BY AUTOMATED COUNT: 20.1 % (ref 11.5–14.5)
ERYTHROCYTE [DISTWIDTH] IN BLOOD BY AUTOMATED COUNT: 20.1 % (ref 11.5–14.5)
ERYTHROCYTE [DISTWIDTH] IN BLOOD BY AUTOMATED COUNT: 20.2 % (ref 11.5–14.5)
ERYTHROCYTE [DISTWIDTH] IN BLOOD BY AUTOMATED COUNT: 20.3 % (ref 11.5–14.5)
ERYTHROCYTE [DISTWIDTH] IN BLOOD BY AUTOMATED COUNT: 20.4 % (ref 11.5–14.5)
ERYTHROCYTE [DISTWIDTH] IN BLOOD BY AUTOMATED COUNT: 20.5 % (ref 11.5–14.5)
ERYTHROCYTE [DISTWIDTH] IN BLOOD BY AUTOMATED COUNT: 21.4 % (ref 11.5–14.5)
EST. AVERAGE GLUCOSE BLD GHB EST-MCNC: 206 MG/DL
GAS FLOW.O2 O2 DELIVERY SYS: ABNORMAL L/MIN
GAS FLOW.O2 SETTING OXYMISER: 2 L/M
GAS FLOW.O2 SETTING OXYMISER: 4 L/M
GLOBULIN SER CALC-MCNC: 2.3 G/DL (ref 1.5–4.5)
GLOBULIN SER CALC-MCNC: 3.4 G/DL (ref 2–4)
GLOBULIN SER CALC-MCNC: 3.9 G/DL (ref 2–4)
GLOBULIN SER CALC-MCNC: 4.1 G/DL (ref 2–4)
GLOBULIN SER CALC-MCNC: 4.2 G/DL (ref 2–4)
GLOBULIN SER CALC-MCNC: 4.3 G/DL (ref 2–4)
GLOBULIN SER CALC-MCNC: 4.8 G/DL (ref 2–4)
GLOBULIN SER CALC-MCNC: 4.8 G/DL (ref 2–4)
GLOBULIN SER CALC-MCNC: 4.9 G/DL (ref 2–4)
GLOBULIN SER CALC-MCNC: 5 G/DL (ref 2–4)
GLOBULIN SER CALC-MCNC: 5.5 G/DL (ref 2–4)
GLUCOSE BLD STRIP.AUTO-MCNC: 105 MG/DL (ref 65–100)
GLUCOSE BLD STRIP.AUTO-MCNC: 107 MG/DL (ref 65–100)
GLUCOSE BLD STRIP.AUTO-MCNC: 130 MG/DL (ref 65–100)
GLUCOSE BLD STRIP.AUTO-MCNC: 131 MG/DL (ref 65–100)
GLUCOSE BLD STRIP.AUTO-MCNC: 131 MG/DL (ref 65–100)
GLUCOSE BLD STRIP.AUTO-MCNC: 136 MG/DL (ref 65–100)
GLUCOSE BLD STRIP.AUTO-MCNC: 137 MG/DL (ref 65–100)
GLUCOSE BLD STRIP.AUTO-MCNC: 141 MG/DL (ref 65–100)
GLUCOSE BLD STRIP.AUTO-MCNC: 144 MG/DL (ref 65–100)
GLUCOSE BLD STRIP.AUTO-MCNC: 146 MG/DL (ref 65–100)
GLUCOSE BLD STRIP.AUTO-MCNC: 149 MG/DL (ref 65–100)
GLUCOSE BLD STRIP.AUTO-MCNC: 153 MG/DL (ref 65–100)
GLUCOSE BLD STRIP.AUTO-MCNC: 155 MG/DL (ref 65–100)
GLUCOSE BLD STRIP.AUTO-MCNC: 158 MG/DL (ref 65–100)
GLUCOSE BLD STRIP.AUTO-MCNC: 158 MG/DL (ref 65–100)
GLUCOSE BLD STRIP.AUTO-MCNC: 166 MG/DL (ref 65–100)
GLUCOSE BLD STRIP.AUTO-MCNC: 166 MG/DL (ref 65–100)
GLUCOSE BLD STRIP.AUTO-MCNC: 170 MG/DL (ref 65–100)
GLUCOSE BLD STRIP.AUTO-MCNC: 172 MG/DL (ref 65–100)
GLUCOSE BLD STRIP.AUTO-MCNC: 175 MG/DL (ref 65–100)
GLUCOSE BLD STRIP.AUTO-MCNC: 177 MG/DL (ref 65–100)
GLUCOSE BLD STRIP.AUTO-MCNC: 177 MG/DL (ref 65–100)
GLUCOSE BLD STRIP.AUTO-MCNC: 180 MG/DL (ref 65–100)
GLUCOSE BLD STRIP.AUTO-MCNC: 183 MG/DL (ref 65–100)
GLUCOSE BLD STRIP.AUTO-MCNC: 184 MG/DL (ref 65–100)
GLUCOSE BLD STRIP.AUTO-MCNC: 186 MG/DL (ref 65–100)
GLUCOSE BLD STRIP.AUTO-MCNC: 187 MG/DL (ref 65–100)
GLUCOSE BLD STRIP.AUTO-MCNC: 188 MG/DL (ref 65–100)
GLUCOSE BLD STRIP.AUTO-MCNC: 195 MG/DL (ref 65–100)
GLUCOSE BLD STRIP.AUTO-MCNC: 197 MG/DL (ref 65–100)
GLUCOSE BLD STRIP.AUTO-MCNC: 197 MG/DL (ref 65–100)
GLUCOSE BLD STRIP.AUTO-MCNC: 198 MG/DL (ref 65–100)
GLUCOSE BLD STRIP.AUTO-MCNC: 199 MG/DL (ref 65–100)
GLUCOSE BLD STRIP.AUTO-MCNC: 200 MG/DL (ref 65–100)
GLUCOSE BLD STRIP.AUTO-MCNC: 202 MG/DL (ref 65–100)
GLUCOSE BLD STRIP.AUTO-MCNC: 203 MG/DL (ref 65–100)
GLUCOSE BLD STRIP.AUTO-MCNC: 204 MG/DL (ref 65–100)
GLUCOSE BLD STRIP.AUTO-MCNC: 204 MG/DL (ref 65–100)
GLUCOSE BLD STRIP.AUTO-MCNC: 207 MG/DL (ref 65–100)
GLUCOSE BLD STRIP.AUTO-MCNC: 208 MG/DL (ref 65–100)
GLUCOSE BLD STRIP.AUTO-MCNC: 213 MG/DL (ref 65–100)
GLUCOSE BLD STRIP.AUTO-MCNC: 219 MG/DL (ref 65–100)
GLUCOSE BLD STRIP.AUTO-MCNC: 225 MG/DL (ref 65–100)
GLUCOSE BLD STRIP.AUTO-MCNC: 226 MG/DL (ref 65–100)
GLUCOSE BLD STRIP.AUTO-MCNC: 228 MG/DL (ref 65–100)
GLUCOSE BLD STRIP.AUTO-MCNC: 228 MG/DL (ref 65–100)
GLUCOSE BLD STRIP.AUTO-MCNC: 232 MG/DL (ref 65–100)
GLUCOSE BLD STRIP.AUTO-MCNC: 235 MG/DL (ref 65–100)
GLUCOSE BLD STRIP.AUTO-MCNC: 236 MG/DL (ref 65–100)
GLUCOSE BLD STRIP.AUTO-MCNC: 238 MG/DL (ref 65–100)
GLUCOSE BLD STRIP.AUTO-MCNC: 238 MG/DL (ref 65–100)
GLUCOSE BLD STRIP.AUTO-MCNC: 241 MG/DL (ref 65–100)
GLUCOSE BLD STRIP.AUTO-MCNC: 241 MG/DL (ref 65–100)
GLUCOSE BLD STRIP.AUTO-MCNC: 244 MG/DL (ref 65–100)
GLUCOSE BLD STRIP.AUTO-MCNC: 248 MG/DL (ref 65–100)
GLUCOSE BLD STRIP.AUTO-MCNC: 248 MG/DL (ref 65–100)
GLUCOSE BLD STRIP.AUTO-MCNC: 249 MG/DL (ref 65–100)
GLUCOSE BLD STRIP.AUTO-MCNC: 253 MG/DL (ref 65–100)
GLUCOSE BLD STRIP.AUTO-MCNC: 253 MG/DL (ref 65–100)
GLUCOSE BLD STRIP.AUTO-MCNC: 255 MG/DL (ref 65–100)
GLUCOSE BLD STRIP.AUTO-MCNC: 263 MG/DL (ref 65–100)
GLUCOSE BLD STRIP.AUTO-MCNC: 264 MG/DL (ref 65–100)
GLUCOSE BLD STRIP.AUTO-MCNC: 265 MG/DL (ref 65–100)
GLUCOSE BLD STRIP.AUTO-MCNC: 269 MG/DL (ref 65–100)
GLUCOSE BLD STRIP.AUTO-MCNC: 269 MG/DL (ref 65–100)
GLUCOSE BLD STRIP.AUTO-MCNC: 271 MG/DL (ref 65–100)
GLUCOSE BLD STRIP.AUTO-MCNC: 271 MG/DL (ref 65–100)
GLUCOSE BLD STRIP.AUTO-MCNC: 273 MG/DL (ref 65–100)
GLUCOSE BLD STRIP.AUTO-MCNC: 274 MG/DL (ref 65–100)
GLUCOSE BLD STRIP.AUTO-MCNC: 281 MG/DL (ref 65–100)
GLUCOSE BLD STRIP.AUTO-MCNC: 283 MG/DL (ref 65–100)
GLUCOSE BLD STRIP.AUTO-MCNC: 285 MG/DL (ref 65–100)
GLUCOSE BLD STRIP.AUTO-MCNC: 287 MG/DL (ref 65–100)
GLUCOSE BLD STRIP.AUTO-MCNC: 291 MG/DL (ref 65–100)
GLUCOSE BLD STRIP.AUTO-MCNC: 291 MG/DL (ref 65–100)
GLUCOSE BLD STRIP.AUTO-MCNC: 292 MG/DL (ref 65–100)
GLUCOSE BLD STRIP.AUTO-MCNC: 295 MG/DL (ref 65–100)
GLUCOSE BLD STRIP.AUTO-MCNC: 307 MG/DL (ref 65–100)
GLUCOSE BLD STRIP.AUTO-MCNC: 310 MG/DL (ref 65–100)
GLUCOSE BLD STRIP.AUTO-MCNC: 311 MG/DL (ref 65–100)
GLUCOSE BLD STRIP.AUTO-MCNC: 311 MG/DL (ref 65–100)
GLUCOSE BLD STRIP.AUTO-MCNC: 312 MG/DL (ref 65–100)
GLUCOSE BLD STRIP.AUTO-MCNC: 318 MG/DL (ref 65–100)
GLUCOSE BLD STRIP.AUTO-MCNC: 323 MG/DL (ref 65–100)
GLUCOSE BLD STRIP.AUTO-MCNC: 323 MG/DL (ref 65–100)
GLUCOSE BLD STRIP.AUTO-MCNC: 330 MG/DL (ref 65–100)
GLUCOSE BLD STRIP.AUTO-MCNC: 333 MG/DL (ref 65–100)
GLUCOSE BLD STRIP.AUTO-MCNC: 333 MG/DL (ref 65–100)
GLUCOSE BLD STRIP.AUTO-MCNC: 335 MG/DL (ref 65–100)
GLUCOSE BLD STRIP.AUTO-MCNC: 335 MG/DL (ref 65–100)
GLUCOSE BLD STRIP.AUTO-MCNC: 336 MG/DL (ref 65–100)
GLUCOSE BLD STRIP.AUTO-MCNC: 342 MG/DL (ref 65–100)
GLUCOSE BLD STRIP.AUTO-MCNC: 344 MG/DL (ref 65–100)
GLUCOSE BLD STRIP.AUTO-MCNC: 347 MG/DL (ref 65–100)
GLUCOSE BLD STRIP.AUTO-MCNC: 348 MG/DL (ref 65–100)
GLUCOSE BLD STRIP.AUTO-MCNC: 363 MG/DL (ref 65–100)
GLUCOSE BLD STRIP.AUTO-MCNC: 392 MG/DL (ref 65–100)
GLUCOSE BLD STRIP.AUTO-MCNC: 436 MG/DL (ref 65–100)
GLUCOSE BLD STRIP.AUTO-MCNC: >600 MG/DL (ref 65–100)
GLUCOSE BLD STRIP.AUTO-MCNC: NORMAL MG/DL (ref 65–100)
GLUCOSE BLD STRIP.AUTO-MCNC: NORMAL MG/DL (ref 65–100)
GLUCOSE SERPL-MCNC: 119 MG/DL (ref 65–100)
GLUCOSE SERPL-MCNC: 121 MG/DL (ref 65–100)
GLUCOSE SERPL-MCNC: 122 MG/DL (ref 65–100)
GLUCOSE SERPL-MCNC: 126 MG/DL (ref 65–100)
GLUCOSE SERPL-MCNC: 139 MG/DL (ref 65–100)
GLUCOSE SERPL-MCNC: 150 MG/DL (ref 65–99)
GLUCOSE SERPL-MCNC: 158 MG/DL (ref 65–100)
GLUCOSE SERPL-MCNC: 161 MG/DL (ref 65–100)
GLUCOSE SERPL-MCNC: 162 MG/DL (ref 65–100)
GLUCOSE SERPL-MCNC: 165 MG/DL (ref 65–100)
GLUCOSE SERPL-MCNC: 166 MG/DL (ref 65–100)
GLUCOSE SERPL-MCNC: 170 MG/DL (ref 65–100)
GLUCOSE SERPL-MCNC: 186 MG/DL (ref 65–100)
GLUCOSE SERPL-MCNC: 187 MG/DL (ref 65–100)
GLUCOSE SERPL-MCNC: 187 MG/DL (ref 65–100)
GLUCOSE SERPL-MCNC: 204 MG/DL (ref 65–100)
GLUCOSE SERPL-MCNC: 204 MG/DL (ref 65–100)
GLUCOSE SERPL-MCNC: 208 MG/DL (ref 65–100)
GLUCOSE SERPL-MCNC: 214 MG/DL (ref 65–100)
GLUCOSE SERPL-MCNC: 231 MG/DL (ref 65–100)
GLUCOSE SERPL-MCNC: 232 MG/DL (ref 65–100)
GLUCOSE SERPL-MCNC: 233 MG/DL (ref 65–100)
GLUCOSE SERPL-MCNC: 237 MG/DL (ref 65–100)
GLUCOSE SERPL-MCNC: 238 MG/DL (ref 65–100)
GLUCOSE SERPL-MCNC: 251 MG/DL (ref 65–100)
GLUCOSE SERPL-MCNC: 258 MG/DL (ref 65–100)
GLUCOSE SERPL-MCNC: 275 MG/DL (ref 65–100)
GLUCOSE SERPL-MCNC: 295 MG/DL (ref 65–100)
GLUCOSE SERPL-MCNC: 310 MG/DL (ref 65–100)
GLUCOSE SERPL-MCNC: 313 MG/DL (ref 65–100)
GLUCOSE SERPL-MCNC: 323 MG/DL (ref 65–100)
GLUCOSE SERPL-MCNC: 339 MG/DL (ref 65–100)
GLUCOSE UR QL: NEGATIVE
GLUCOSE UR STRIP.AUTO-MCNC: NEGATIVE MG/DL
GRAM STN SPEC: NORMAL
GRAM STN SPEC: NORMAL
HBA1C MFR BLD: 8.8 % (ref 4–5.6)
HCO3 BLD-SCNC: 16 MMOL/L (ref 22–26)
HCO3 BLD-SCNC: 16.1 MMOL/L (ref 22–26)
HCO3 BLD-SCNC: 24 MMOL/L (ref 22–26)
HCT VFR BLD AUTO: 23.1 % (ref 35–47)
HCT VFR BLD AUTO: 24 % (ref 35–47)
HCT VFR BLD AUTO: 24.8 % (ref 35–47)
HCT VFR BLD AUTO: 25.5 % (ref 34–46.6)
HCT VFR BLD AUTO: 25.7 % (ref 35–47)
HCT VFR BLD AUTO: 25.8 % (ref 35–47)
HCT VFR BLD AUTO: 26.8 % (ref 35–47)
HCT VFR BLD AUTO: 26.9 % (ref 35–47)
HCT VFR BLD AUTO: 27.6 % (ref 35–47)
HCT VFR BLD AUTO: 27.6 % (ref 35–47)
HCT VFR BLD AUTO: 28 % (ref 35–47)
HCT VFR BLD AUTO: 28 % (ref 35–47)
HCT VFR BLD AUTO: 28.1 % (ref 35–47)
HCT VFR BLD AUTO: 28.5 % (ref 35–47)
HCT VFR BLD AUTO: 29.1 % (ref 35–47)
HCT VFR BLD AUTO: 29.1 % (ref 35–47)
HCT VFR BLD AUTO: 29.3 % (ref 35–47)
HCT VFR BLD AUTO: 29.4 % (ref 35–47)
HCT VFR BLD AUTO: 29.5 % (ref 35–47)
HCT VFR BLD AUTO: 30.6 % (ref 35–47)
HCT VFR BLD AUTO: 31 % (ref 35–47)
HCT VFR BLD AUTO: 31.4 % (ref 35–47)
HCT VFR BLD AUTO: 32.1 % (ref 35–47)
HCT VFR BLD AUTO: 32.4 % (ref 35–47)
HCT VFR BLD AUTO: 33.2 % (ref 35–47)
HEALTH STATUS, XMCV2T: NORMAL
HGB BLD-MCNC: 10 G/DL (ref 11.5–16)
HGB BLD-MCNC: 10 G/DL (ref 11.5–16)
HGB BLD-MCNC: 10.1 G/DL (ref 11.5–16)
HGB BLD-MCNC: 10.2 G/DL (ref 11.5–16)
HGB BLD-MCNC: 7.2 G/DL (ref 11.5–16)
HGB BLD-MCNC: 7.3 G/DL (ref 11.5–16)
HGB BLD-MCNC: 7.5 G/DL (ref 11.5–16)
HGB BLD-MCNC: 7.8 G/DL (ref 11.5–16)
HGB BLD-MCNC: 7.9 G/DL (ref 11.1–15.9)
HGB BLD-MCNC: 8 G/DL (ref 11.5–16)
HGB BLD-MCNC: 8.3 G/DL (ref 11.5–16)
HGB BLD-MCNC: 8.4 G/DL (ref 11.5–16)
HGB BLD-MCNC: 8.5 G/DL (ref 11.5–16)
HGB BLD-MCNC: 8.8 G/DL (ref 11.5–16)
HGB BLD-MCNC: 8.9 G/DL (ref 11.5–16)
HGB BLD-MCNC: 9 G/DL (ref 11.5–16)
HGB BLD-MCNC: 9.1 G/DL (ref 11.5–16)
HGB BLD-MCNC: 9.2 G/DL (ref 11.5–16)
HGB BLD-MCNC: 9.3 G/DL (ref 11.5–16)
HGB BLD-MCNC: 9.7 G/DL (ref 11.5–16)
HGB UR QL STRIP: ABNORMAL
HGB UR QL STRIP: NEGATIVE
HGB UR QL STRIP: NEGATIVE
HYALINE CASTS URNS QL MICRO: ABNORMAL /LPF (ref 0–5)
HYALINE CASTS URNS QL MICRO: ABNORMAL /LPF (ref 0–5)
IMM GRANULOCYTES # BLD AUTO: 0 K/UL
IMM GRANULOCYTES # BLD AUTO: 0 K/UL
IMM GRANULOCYTES # BLD AUTO: 0 K/UL (ref 0–0.04)
IMM GRANULOCYTES # BLD AUTO: 0 K/UL (ref 0–0.04)
IMM GRANULOCYTES # BLD AUTO: 0 X10E3/UL (ref 0–0.1)
IMM GRANULOCYTES # BLD AUTO: 0.1 K/UL (ref 0–0.04)
IMM GRANULOCYTES # BLD AUTO: 0.2 K/UL (ref 0–0.04)
IMM GRANULOCYTES # BLD AUTO: 0.3 K/UL (ref 0–0.04)
IMM GRANULOCYTES # BLD AUTO: 0.3 K/UL (ref 0–0.04)
IMM GRANULOCYTES # BLD AUTO: 0.4 K/UL (ref 0–0.04)
IMM GRANULOCYTES # BLD AUTO: 0.6 K/UL (ref 0–0.04)
IMM GRANULOCYTES NFR BLD AUTO: 0 %
IMM GRANULOCYTES NFR BLD AUTO: 0 % (ref 0–0.5)
IMM GRANULOCYTES NFR BLD AUTO: 1 % (ref 0–0.5)
IMM GRANULOCYTES NFR BLD AUTO: 2 % (ref 0–0.5)
IMM GRANULOCYTES NFR BLD AUTO: 6 % (ref 0–0.5)
INR PPP: 1.3 (ref 0.9–1.1)
KETONES UR QL STRIP.AUTO: ABNORMAL MG/DL
KETONES UR QL STRIP.AUTO: NEGATIVE MG/DL
KETONES UR QL STRIP: NEGATIVE
LACTATE SERPL-SCNC: 0.8 MMOL/L (ref 0.4–2)
LACTATE SERPL-SCNC: 0.9 MMOL/L (ref 0.4–2)
LACTATE SERPL-SCNC: 0.9 MMOL/L (ref 0.4–2)
LACTATE SERPL-SCNC: 1.1 MMOL/L (ref 0.4–2)
LACTATE SERPL-SCNC: 1.3 MMOL/L (ref 0.4–2)
LACTATE SERPL-SCNC: 1.3 MMOL/L (ref 0.4–2)
LACTATE SERPL-SCNC: 1.4 MMOL/L (ref 0.4–2)
LACTATE SERPL-SCNC: 1.7 MMOL/L (ref 0.4–2)
LEUKOCYTE ESTERASE UR QL STRIP.AUTO: ABNORMAL
LEUKOCYTE ESTERASE UR QL STRIP: ABNORMAL
LYMPHOCYTES # BLD AUTO: 0.7 X10E3/UL (ref 0.7–3.1)
LYMPHOCYTES # BLD: 0.1 K/UL (ref 0.8–3.5)
LYMPHOCYTES # BLD: 0.2 K/UL (ref 0.8–3.5)
LYMPHOCYTES # BLD: 0.3 K/UL (ref 0.8–3.5)
LYMPHOCYTES # BLD: 0.3 K/UL (ref 0.8–3.5)
LYMPHOCYTES # BLD: 0.4 K/UL (ref 0.8–3.5)
LYMPHOCYTES # BLD: 0.5 K/UL (ref 0.8–3.5)
LYMPHOCYTES # BLD: 0.6 K/UL (ref 0.8–3.5)
LYMPHOCYTES # BLD: 0.9 K/UL (ref 0.8–3.5)
LYMPHOCYTES # BLD: 1.1 K/UL (ref 0.8–3.5)
LYMPHOCYTES NFR BLD AUTO: 9 %
LYMPHOCYTES NFR BLD: 1 % (ref 12–49)
LYMPHOCYTES NFR BLD: 10 % (ref 12–49)
LYMPHOCYTES NFR BLD: 14 % (ref 12–49)
LYMPHOCYTES NFR BLD: 2 % (ref 12–49)
LYMPHOCYTES NFR BLD: 3 % (ref 12–49)
LYMPHOCYTES NFR BLD: 3 % (ref 12–49)
LYMPHOCYTES NFR BLD: 4 % (ref 12–49)
LYMPHOCYTES NFR BLD: 5 % (ref 12–49)
LYMPHOCYTES NFR BLD: 6 % (ref 12–49)
LYMPHOCYTES NFR BLD: 7 % (ref 12–49)
LYMPHOCYTES NFR BLD: 8 % (ref 12–49)
LYMPHOCYTES NFR BLD: 9 % (ref 12–49)
LYMPHOCYTES NFR BLD: 9 % (ref 12–49)
MAGNESIUM SERPL-MCNC: 1.8 MG/DL (ref 1.6–2.4)
MAGNESIUM SERPL-MCNC: 1.9 MG/DL (ref 1.6–2.4)
MAGNESIUM SERPL-MCNC: 1.9 MG/DL (ref 1.6–2.4)
MAGNESIUM SERPL-MCNC: 2 MG/DL (ref 1.6–2.4)
MAGNESIUM SERPL-MCNC: 2.1 MG/DL (ref 1.6–2.4)
MAGNESIUM SERPL-MCNC: 2.2 MG/DL (ref 1.6–2.4)
MAGNESIUM SERPL-MCNC: NORMAL MG/DL (ref 1.6–2.4)
MCH RBC QN AUTO: 25.1 PG (ref 26–34)
MCH RBC QN AUTO: 25.4 PG (ref 26–34)
MCH RBC QN AUTO: 25.7 PG (ref 26–34)
MCH RBC QN AUTO: 26.1 PG (ref 26–34)
MCH RBC QN AUTO: 26.1 PG (ref 26–34)
MCH RBC QN AUTO: 26.3 PG (ref 26–34)
MCH RBC QN AUTO: 26.4 PG (ref 26–34)
MCH RBC QN AUTO: 26.4 PG (ref 26–34)
MCH RBC QN AUTO: 26.6 PG (ref 26–34)
MCH RBC QN AUTO: 26.8 PG (ref 26–34)
MCH RBC QN AUTO: 27.6 PG (ref 26–34)
MCH RBC QN AUTO: 28.9 PG (ref 26–34)
MCH RBC QN AUTO: 29.4 PG (ref 26–34)
MCH RBC QN AUTO: 29.7 PG (ref 26.6–33)
MCH RBC QN AUTO: 29.9 PG (ref 26–34)
MCH RBC QN AUTO: 29.9 PG (ref 26–34)
MCH RBC QN AUTO: 30 PG (ref 26–34)
MCH RBC QN AUTO: 30 PG (ref 26–34)
MCH RBC QN AUTO: 30.3 PG (ref 26–34)
MCH RBC QN AUTO: 30.9 PG (ref 26–34)
MCH RBC QN AUTO: 31 PG (ref 26–34)
MCHC RBC AUTO-ENTMCNC: 29.6 G/DL (ref 30–36.5)
MCHC RBC AUTO-ENTMCNC: 29.9 G/DL (ref 30–36.5)
MCHC RBC AUTO-ENTMCNC: 30.1 G/DL (ref 30–36.5)
MCHC RBC AUTO-ENTMCNC: 30.2 G/DL (ref 30–36.5)
MCHC RBC AUTO-ENTMCNC: 30.2 G/DL (ref 30–36.5)
MCHC RBC AUTO-ENTMCNC: 30.4 G/DL (ref 30–36.5)
MCHC RBC AUTO-ENTMCNC: 30.6 G/DL (ref 30–36.5)
MCHC RBC AUTO-ENTMCNC: 30.8 G/DL (ref 30–36.5)
MCHC RBC AUTO-ENTMCNC: 30.9 G/DL (ref 30–36.5)
MCHC RBC AUTO-ENTMCNC: 31 G/DL (ref 30–36.5)
MCHC RBC AUTO-ENTMCNC: 31 G/DL (ref 31.5–35.7)
MCHC RBC AUTO-ENTMCNC: 31.2 G/DL (ref 30–36.5)
MCHC RBC AUTO-ENTMCNC: 31.2 G/DL (ref 30–36.5)
MCHC RBC AUTO-ENTMCNC: 31.3 G/DL (ref 30–36.5)
MCHC RBC AUTO-ENTMCNC: 31.3 G/DL (ref 30–36.5)
MCHC RBC AUTO-ENTMCNC: 31.4 G/DL (ref 30–36.5)
MCHC RBC AUTO-ENTMCNC: 31.5 G/DL (ref 30–36.5)
MCHC RBC AUTO-ENTMCNC: 31.6 G/DL (ref 30–36.5)
MCHC RBC AUTO-ENTMCNC: 32.2 G/DL (ref 30–36.5)
MCHC RBC AUTO-ENTMCNC: 33 G/DL (ref 30–36.5)
MCHC RBC AUTO-ENTMCNC: 33.2 G/DL (ref 30–36.5)
MCHC RBC AUTO-ENTMCNC: 33.5 G/DL (ref 30–36.5)
MCV RBC AUTO: 100.3 FL (ref 80–99)
MCV RBC AUTO: 102.4 FL (ref 80–99)
MCV RBC AUTO: 83.5 FL (ref 80–99)
MCV RBC AUTO: 83.9 FL (ref 80–99)
MCV RBC AUTO: 84.3 FL (ref 80–99)
MCV RBC AUTO: 84.5 FL (ref 80–99)
MCV RBC AUTO: 84.8 FL (ref 80–99)
MCV RBC AUTO: 84.9 FL (ref 80–99)
MCV RBC AUTO: 84.9 FL (ref 80–99)
MCV RBC AUTO: 85.6 FL (ref 80–99)
MCV RBC AUTO: 85.7 FL (ref 80–99)
MCV RBC AUTO: 85.9 FL (ref 80–99)
MCV RBC AUTO: 85.9 FL (ref 80–99)
MCV RBC AUTO: 86.4 FL (ref 80–99)
MCV RBC AUTO: 86.7 FL (ref 80–99)
MCV RBC AUTO: 87.4 FL (ref 80–99)
MCV RBC AUTO: 87.8 FL (ref 80–99)
MCV RBC AUTO: 90.2 FL (ref 80–99)
MCV RBC AUTO: 90.6 FL (ref 80–99)
MCV RBC AUTO: 91.1 FL (ref 80–99)
MCV RBC AUTO: 92.8 FL (ref 80–99)
MCV RBC AUTO: 92.9 FL (ref 80–99)
MCV RBC AUTO: 93.6 FL (ref 80–99)
MCV RBC AUTO: 95.7 FL (ref 80–99)
MCV RBC AUTO: 96 FL (ref 79–97)
MICRO URNS: ABNORMAL
MONOCYTES # BLD AUTO: 0.7 X10E3/UL (ref 0.1–0.9)
MONOCYTES # BLD: 0.7 K/UL (ref 0–1)
MONOCYTES # BLD: 0.8 K/UL (ref 0–1)
MONOCYTES # BLD: 0.9 K/UL (ref 0–1)
MONOCYTES # BLD: 1.1 K/UL (ref 0–1)
MONOCYTES # BLD: 1.2 K/UL (ref 0–1)
MONOCYTES # BLD: 1.3 K/UL (ref 0–1)
MONOCYTES # BLD: 1.3 K/UL (ref 0–1)
MONOCYTES # BLD: 1.4 K/UL (ref 0–1)
MONOCYTES # BLD: 1.4 K/UL (ref 0–1)
MONOCYTES # BLD: 1.8 K/UL (ref 0–1)
MONOCYTES # BLD: 1.9 K/UL (ref 0–1)
MONOCYTES # BLD: 1.9 K/UL (ref 0–1)
MONOCYTES # BLD: 2.1 K/UL (ref 0–1)
MONOCYTES # BLD: 2.2 K/UL (ref 0–1)
MONOCYTES NFR BLD AUTO: 9 %
MONOCYTES NFR BLD: 10 % (ref 5–13)
MONOCYTES NFR BLD: 11 % (ref 5–13)
MONOCYTES NFR BLD: 11 % (ref 5–13)
MONOCYTES NFR BLD: 12 % (ref 5–13)
MONOCYTES NFR BLD: 12 % (ref 5–13)
MONOCYTES NFR BLD: 14 % (ref 5–13)
MONOCYTES NFR BLD: 14 % (ref 5–13)
MONOCYTES NFR BLD: 15 % (ref 5–13)
MONOCYTES NFR BLD: 17 % (ref 5–13)
MONOCYTES NFR BLD: 19 % (ref 5–13)
MONOCYTES NFR BLD: 19 % (ref 5–13)
MONOCYTES NFR BLD: 23 % (ref 5–13)
MONOCYTES NFR BLD: 26 % (ref 5–13)
MONOCYTES NFR BLD: 7 % (ref 5–13)
MONOCYTES NFR BLD: 7 % (ref 5–13)
MONOCYTES NFR BLD: 8 % (ref 5–13)
MONOCYTES NFR BLD: 9 % (ref 5–13)
MONOCYTES NFR BLD: 9 % (ref 5–13)
MUCOUS THREADS URNS QL MICRO: ABNORMAL /LPF
MUCOUS THREADS URNS QL MICRO: PRESENT
MYELOCYTES NFR BLD MANUAL: 1 %
NEUTROPHILS # BLD AUTO: 5.7 X10E3/UL (ref 1.4–7)
NEUTROPHILS NFR BLD AUTO: 74 %
NEUTS BAND NFR BLD MANUAL: 2 % (ref 0–6)
NEUTS SEG # BLD: 10 K/UL (ref 1.8–8)
NEUTS SEG # BLD: 10.5 K/UL (ref 1.8–8)
NEUTS SEG # BLD: 11.2 K/UL (ref 1.8–8)
NEUTS SEG # BLD: 18.4 K/UL (ref 1.8–8)
NEUTS SEG # BLD: 2.4 K/UL (ref 1.8–8)
NEUTS SEG # BLD: 23.8 K/UL (ref 1.8–8)
NEUTS SEG # BLD: 27.2 K/UL (ref 1.8–8)
NEUTS SEG # BLD: 3.2 K/UL (ref 1.8–8)
NEUTS SEG # BLD: 3.8 K/UL (ref 1.8–8)
NEUTS SEG # BLD: 3.8 K/UL (ref 1.8–8)
NEUTS SEG # BLD: 4.3 K/UL (ref 1.8–8)
NEUTS SEG # BLD: 4.8 K/UL (ref 1.8–8)
NEUTS SEG # BLD: 5.4 K/UL (ref 1.8–8)
NEUTS SEG # BLD: 7.3 K/UL (ref 1.8–8)
NEUTS SEG # BLD: 7.3 K/UL (ref 1.8–8)
NEUTS SEG # BLD: 8 K/UL (ref 1.8–8)
NEUTS SEG # BLD: 9.1 K/UL (ref 1.8–8)
NEUTS SEG # BLD: 9.5 K/UL (ref 1.8–8)
NEUTS SEG NFR BLD: 59 % (ref 32–75)
NEUTS SEG NFR BLD: 65 % (ref 32–75)
NEUTS SEG NFR BLD: 66 % (ref 32–75)
NEUTS SEG NFR BLD: 67 % (ref 32–75)
NEUTS SEG NFR BLD: 69 % (ref 32–75)
NEUTS SEG NFR BLD: 70 % (ref 32–75)
NEUTS SEG NFR BLD: 77 % (ref 32–75)
NEUTS SEG NFR BLD: 79 % (ref 32–75)
NEUTS SEG NFR BLD: 80 % (ref 32–75)
NEUTS SEG NFR BLD: 81 % (ref 32–75)
NEUTS SEG NFR BLD: 82 % (ref 32–75)
NEUTS SEG NFR BLD: 82 % (ref 32–75)
NEUTS SEG NFR BLD: 83 % (ref 32–75)
NEUTS SEG NFR BLD: 85 % (ref 32–75)
NEUTS SEG NFR BLD: 85 % (ref 32–75)
NEUTS SEG NFR BLD: 86 % (ref 32–75)
NEUTS SEG NFR BLD: 88 % (ref 32–75)
NEUTS SEG NFR BLD: 89 % (ref 32–75)
NITRITE UR QL STRIP.AUTO: NEGATIVE
NITRITE UR QL STRIP.AUTO: POSITIVE
NITRITE UR QL STRIP: NEGATIVE
NRBC # BLD: 0 K/UL (ref 0–0.01)
NRBC # BLD: 0.02 K/UL (ref 0–0.01)
NRBC # BLD: 0.03 K/UL (ref 0–0.01)
NRBC # BLD: 0.03 K/UL (ref 0–0.01)
NRBC # BLD: 0.04 K/UL (ref 0–0.01)
NRBC # BLD: 0.04 K/UL (ref 0–0.01)
NRBC # BLD: 0.05 K/UL (ref 0–0.01)
NRBC # BLD: 0.08 K/UL (ref 0–0.01)
NRBC # BLD: 0.13 K/UL (ref 0–0.01)
NRBC BLD-RTO: 0 PER 100 WBC
NRBC BLD-RTO: 0.1 PER 100 WBC
NRBC BLD-RTO: 0.3 PER 100 WBC
NRBC BLD-RTO: 0.4 PER 100 WBC
NRBC BLD-RTO: 0.4 PER 100 WBC
NRBC BLD-RTO: 0.6 PER 100 WBC
NRBC BLD-RTO: 0.7 PER 100 WBC
NRBC BLD-RTO: 0.8 PER 100 WBC
NRBC BLD-RTO: 1 PER 100 WBC
O2/TOTAL GAS SETTING VFR VENT: 0.3 %
P-R INTERVAL, ECG05: 136 MS
P-R INTERVAL, ECG05: 140 MS
P-R INTERVAL, ECG05: 144 MS
P-R INTERVAL, ECG05: 152 MS
P-R INTERVAL, ECG05: 158 MS
P-R INTERVAL, ECG05: 160 MS
P-R INTERVAL, ECG05: 162 MS
P-R INTERVAL, ECG05: 162 MS
P-R INTERVAL, ECG05: 178 MS
PCO2 BLD: 28.8 MMHG (ref 35–45)
PCO2 BLD: 36.4 MMHG (ref 35–45)
PCO2 BLD: 41.7 MMHG (ref 35–45)
PEEP RESPIRATORY: 5 CMH2O
PH BLD: 7.25 [PH] (ref 7.35–7.45)
PH BLD: 7.35 [PH] (ref 7.35–7.45)
PH BLD: 7.37 [PH] (ref 7.35–7.45)
PH UR STRIP: 5 [PH] (ref 5–8)
PH UR STRIP: 5.5 [PH] (ref 5–7.5)
PH UR STRIP: 5.5 [PH] (ref 5–8)
PH UR STRIP: 6 [PH] (ref 5–8)
PH UR STRIP: 6 [PH] (ref 5–8)
PH UR STRIP: 6.5 [PH] (ref 5–8)
PHOSPHATE SERPL-MCNC: 2.6 MG/DL (ref 2.6–4.7)
PHOSPHATE SERPL-MCNC: 2.9 MG/DL (ref 2.6–4.7)
PHOSPHATE SERPL-MCNC: 2.9 MG/DL (ref 2.6–4.7)
PHOSPHATE SERPL-MCNC: 3.1 MG/DL (ref 2.6–4.7)
PHOSPHATE SERPL-MCNC: 3.2 MG/DL (ref 2.6–4.7)
PHOSPHATE SERPL-MCNC: 3.4 MG/DL (ref 2.6–4.7)
PHOSPHATE SERPL-MCNC: NORMAL MG/DL (ref 2.6–4.7)
PLATELET # BLD AUTO: 112 K/UL (ref 150–400)
PLATELET # BLD AUTO: 115 K/UL (ref 150–400)
PLATELET # BLD AUTO: 147 K/UL (ref 150–400)
PLATELET # BLD AUTO: 164 K/UL (ref 150–400)
PLATELET # BLD AUTO: 173 K/UL (ref 150–400)
PLATELET # BLD AUTO: 177 K/UL (ref 150–400)
PLATELET # BLD AUTO: 180 K/UL (ref 150–400)
PLATELET # BLD AUTO: 181 K/UL (ref 150–400)
PLATELET # BLD AUTO: 182 K/UL (ref 150–400)
PLATELET # BLD AUTO: 183 K/UL (ref 150–400)
PLATELET # BLD AUTO: 186 K/UL (ref 150–400)
PLATELET # BLD AUTO: 190 K/UL (ref 150–400)
PLATELET # BLD AUTO: 195 K/UL (ref 150–400)
PLATELET # BLD AUTO: 197 K/UL (ref 150–400)
PLATELET # BLD AUTO: 199 K/UL (ref 150–400)
PLATELET # BLD AUTO: 210 K/UL (ref 150–400)
PLATELET # BLD AUTO: 219 K/UL (ref 150–400)
PLATELET # BLD AUTO: 223 K/UL (ref 150–400)
PLATELET # BLD AUTO: 231 K/UL (ref 150–400)
PLATELET # BLD AUTO: 240 K/UL (ref 150–400)
PLATELET # BLD AUTO: 253 K/UL (ref 150–400)
PLATELET # BLD AUTO: 304 K/UL (ref 150–400)
PLATELET # BLD AUTO: 305 X10E3/UL (ref 150–450)
PLATELET # BLD AUTO: 317 K/UL (ref 150–400)
PLATELET # BLD AUTO: 329 K/UL (ref 150–400)
PLATELET COMMENTS,PCOM: ABNORMAL
PLATELET COMMENTS,PCOM: ABNORMAL
PMV BLD AUTO: 10 FL (ref 8.9–12.9)
PMV BLD AUTO: 10.1 FL (ref 8.9–12.9)
PMV BLD AUTO: 10.2 FL (ref 8.9–12.9)
PMV BLD AUTO: 10.2 FL (ref 8.9–12.9)
PMV BLD AUTO: 10.3 FL (ref 8.9–12.9)
PMV BLD AUTO: 10.3 FL (ref 8.9–12.9)
PMV BLD AUTO: 10.4 FL (ref 8.9–12.9)
PMV BLD AUTO: 10.4 FL (ref 8.9–12.9)
PMV BLD AUTO: 10.5 FL (ref 8.9–12.9)
PMV BLD AUTO: 10.5 FL (ref 8.9–12.9)
PMV BLD AUTO: 10.6 FL (ref 8.9–12.9)
PMV BLD AUTO: 10.7 FL (ref 8.9–12.9)
PMV BLD AUTO: 10.7 FL (ref 8.9–12.9)
PMV BLD AUTO: 10.8 FL (ref 8.9–12.9)
PMV BLD AUTO: 10.9 FL (ref 8.9–12.9)
PMV BLD AUTO: 11.1 FL (ref 8.9–12.9)
PMV BLD AUTO: 11.1 FL (ref 8.9–12.9)
PMV BLD AUTO: 11.6 FL (ref 8.9–12.9)
PMV BLD AUTO: 9.4 FL (ref 8.9–12.9)
PMV BLD AUTO: 9.4 FL (ref 8.9–12.9)
PMV BLD AUTO: 9.5 FL (ref 8.9–12.9)
PMV BLD AUTO: 9.7 FL (ref 8.9–12.9)
PMV BLD AUTO: 9.8 FL (ref 8.9–12.9)
PMV BLD AUTO: 9.9 FL (ref 8.9–12.9)
PO2 BLD: 106 MMHG (ref 80–100)
PO2 BLD: 114 MMHG (ref 80–100)
PO2 BLD: 35 MMHG (ref 80–100)
POTASSIUM SERPL-SCNC: 3.3 MMOL/L (ref 3.5–5.1)
POTASSIUM SERPL-SCNC: 3.4 MMOL/L (ref 3.5–5.1)
POTASSIUM SERPL-SCNC: 3.4 MMOL/L (ref 3.5–5.1)
POTASSIUM SERPL-SCNC: 3.5 MMOL/L (ref 3.5–5.1)
POTASSIUM SERPL-SCNC: 3.5 MMOL/L (ref 3.5–5.1)
POTASSIUM SERPL-SCNC: 3.6 MMOL/L (ref 3.5–5.1)
POTASSIUM SERPL-SCNC: 3.6 MMOL/L (ref 3.5–5.1)
POTASSIUM SERPL-SCNC: 3.6 MMOL/L (ref 3.5–5.2)
POTASSIUM SERPL-SCNC: 3.7 MMOL/L (ref 3.5–5.1)
POTASSIUM SERPL-SCNC: 3.8 MMOL/L (ref 3.5–5.1)
POTASSIUM SERPL-SCNC: 3.9 MMOL/L (ref 3.5–5.1)
POTASSIUM SERPL-SCNC: 4 MMOL/L (ref 3.5–5.1)
POTASSIUM SERPL-SCNC: 4.2 MMOL/L (ref 3.5–5.1)
POTASSIUM SERPL-SCNC: 4.2 MMOL/L (ref 3.5–5.1)
POTASSIUM SERPL-SCNC: 4.5 MMOL/L (ref 3.5–5.1)
POTASSIUM SERPL-SCNC: 4.5 MMOL/L (ref 3.5–5.1)
POTASSIUM SERPL-SCNC: 4.6 MMOL/L (ref 3.5–5.1)
POTASSIUM SERPL-SCNC: 4.7 MMOL/L (ref 3.5–5.1)
POTASSIUM SERPL-SCNC: 4.7 MMOL/L (ref 3.5–5.1)
POTASSIUM SERPL-SCNC: 4.8 MMOL/L (ref 3.5–5.1)
POTASSIUM SERPL-SCNC: 4.9 MMOL/L (ref 3.5–5.1)
POTASSIUM SERPL-SCNC: 5 MMOL/L (ref 3.5–5.1)
POTASSIUM SERPL-SCNC: 5.1 MMOL/L (ref 3.5–5.1)
POTASSIUM SERPL-SCNC: 5.1 MMOL/L (ref 3.5–5.1)
POTASSIUM SERPL-SCNC: 5.2 MMOL/L (ref 3.5–5.1)
POTASSIUM SERPL-SCNC: 5.3 MMOL/L (ref 3.5–5.1)
PROCALCITONIN SERPL-MCNC: 0.37 NG/ML
PROCALCITONIN SERPL-MCNC: 0.42 NG/ML
PROCALCITONIN SERPL-MCNC: 0.77 NG/ML
PROCALCITONIN SERPL-MCNC: 2.98 NG/ML
PROCALCITONIN SERPL-MCNC: 3.07 NG/ML
PROCALCITONIN SERPL-MCNC: 3.36 NG/ML
PROCALCITONIN SERPL-MCNC: 4.11 NG/ML
PROT SERPL-MCNC: 5.3 G/DL (ref 6–8.5)
PROT SERPL-MCNC: 6.3 G/DL (ref 6.4–8.2)
PROT SERPL-MCNC: 6.4 G/DL (ref 6.4–8.2)
PROT SERPL-MCNC: 6.4 G/DL (ref 6.4–8.2)
PROT SERPL-MCNC: 6.8 G/DL (ref 6.4–8.2)
PROT SERPL-MCNC: 7.2 G/DL (ref 6.4–8.2)
PROT SERPL-MCNC: 7.2 G/DL (ref 6.4–8.2)
PROT SERPL-MCNC: 7.3 G/DL (ref 6.4–8.2)
PROT SERPL-MCNC: 7.5 G/DL (ref 6.4–8.2)
PROT SERPL-MCNC: 7.5 G/DL (ref 6.4–8.2)
PROT SERPL-MCNC: 8.2 G/DL (ref 6.4–8.2)
PROT UR QL STRIP: NEGATIVE
PROT UR STRIP-MCNC: 100 MG/DL
PROT UR STRIP-MCNC: 30 MG/DL
PROT UR STRIP-MCNC: 30 MG/DL
PROT UR STRIP-MCNC: 300 MG/DL
PROT UR STRIP-MCNC: ABNORMAL MG/DL
PROTHROMBIN TIME: 12.8 SEC (ref 9–11.1)
Q-T INTERVAL, ECG07: 302 MS
Q-T INTERVAL, ECG07: 320 MS
Q-T INTERVAL, ECG07: 330 MS
Q-T INTERVAL, ECG07: 330 MS
Q-T INTERVAL, ECG07: 336 MS
Q-T INTERVAL, ECG07: 362 MS
Q-T INTERVAL, ECG07: 362 MS
Q-T INTERVAL, ECG07: 366 MS
Q-T INTERVAL, ECG07: 368 MS
Q-T INTERVAL, ECG07: 372 MS
Q-T INTERVAL, ECG07: 374 MS
Q-T INTERVAL, ECG07: 376 MS
Q-T INTERVAL, ECG07: 382 MS
QRS DURATION, ECG06: 130 MS
QRS DURATION, ECG06: 74 MS
QRS DURATION, ECG06: 78 MS
QRS DURATION, ECG06: 82 MS
QRS DURATION, ECG06: 82 MS
QRS DURATION, ECG06: 84 MS
QRS DURATION, ECG06: 84 MS
QRS DURATION, ECG06: 86 MS
QRS DURATION, ECG06: 86 MS
QRS DURATION, ECG06: 88 MS
QRS DURATION, ECG06: 90 MS
QRS DURATION, ECG06: 90 MS
QRS DURATION, ECG06: 94 MS
QTC CALCULATION (BEZET), ECG08: 437 MS
QTC CALCULATION (BEZET), ECG08: 444 MS
QTC CALCULATION (BEZET), ECG08: 447 MS
QTC CALCULATION (BEZET), ECG08: 452 MS
QTC CALCULATION (BEZET), ECG08: 452 MS
QTC CALCULATION (BEZET), ECG08: 455 MS
QTC CALCULATION (BEZET), ECG08: 456 MS
QTC CALCULATION (BEZET), ECG08: 457 MS
QTC CALCULATION (BEZET), ECG08: 472 MS
QTC CALCULATION (BEZET), ECG08: 477 MS
QTC CALCULATION (BEZET), ECG08: 477 MS
QTC CALCULATION (BEZET), ECG08: 491 MS
QTC CALCULATION (BEZET), ECG08: 538 MS
RBC # BLD AUTO: 2.66 X10E6/UL (ref 3.77–5.28)
RBC # BLD AUTO: 2.69 M/UL (ref 3.8–5.2)
RBC # BLD AUTO: 2.84 M/UL (ref 3.8–5.2)
RBC # BLD AUTO: 2.88 M/UL (ref 3.8–5.2)
RBC # BLD AUTO: 2.9 M/UL (ref 3.8–5.2)
RBC # BLD AUTO: 2.92 M/UL (ref 3.8–5.2)
RBC # BLD AUTO: 2.97 M/UL (ref 3.8–5.2)
RBC # BLD AUTO: 2.97 M/UL (ref 3.8–5.2)
RBC # BLD AUTO: 2.99 M/UL (ref 3.8–5.2)
RBC # BLD AUTO: 3.03 M/UL (ref 3.8–5.2)
RBC # BLD AUTO: 3.03 M/UL (ref 3.8–5.2)
RBC # BLD AUTO: 3.04 M/UL (ref 3.8–5.2)
RBC # BLD AUTO: 3.22 M/UL (ref 3.8–5.2)
RBC # BLD AUTO: 3.24 M/UL (ref 3.8–5.2)
RBC # BLD AUTO: 3.27 M/UL (ref 3.8–5.2)
RBC # BLD AUTO: 3.35 M/UL (ref 3.8–5.2)
RBC # BLD AUTO: 3.37 M/UL (ref 3.8–5.2)
RBC # BLD AUTO: 3.38 M/UL (ref 3.8–5.2)
RBC # BLD AUTO: 3.38 M/UL (ref 3.8–5.2)
RBC # BLD AUTO: 3.41 M/UL (ref 3.8–5.2)
RBC # BLD AUTO: 3.46 M/UL (ref 3.8–5.2)
RBC # BLD AUTO: 3.52 M/UL (ref 3.8–5.2)
RBC # BLD AUTO: 3.53 M/UL (ref 3.8–5.2)
RBC # BLD AUTO: 3.69 M/UL (ref 3.8–5.2)
RBC # BLD AUTO: 3.8 M/UL (ref 3.8–5.2)
RBC #/AREA URNS HPF: ABNORMAL /HPF (ref 0–2)
RBC #/AREA URNS HPF: ABNORMAL /HPF (ref 0–5)
RBC MORPH BLD: ABNORMAL
SAMPLES BEING HELD,HOLD: NORMAL
SAO2 % BLD: 58 % (ref 92–97)
SAO2 % BLD: 98 % (ref 92–97)
SAO2 % BLD: 98 % (ref 92–97)
SARS-COV-2, COV2: NOT DETECTED
SARS-COV-2, COV2NT: NOT DETECTED
SARS-COV-2, NAA: DETECTED
SARS-COV-2, NAA: DETECTED
SARS-COV-2, NAA: NOT DETECTED
SERVICE CMNT-IMP: ABNORMAL
SERVICE CMNT-IMP: NORMAL
SODIUM SERPL-SCNC: 126 MMOL/L (ref 136–145)
SODIUM SERPL-SCNC: 129 MMOL/L (ref 136–145)
SODIUM SERPL-SCNC: 129 MMOL/L (ref 136–145)
SODIUM SERPL-SCNC: 131 MMOL/L (ref 136–145)
SODIUM SERPL-SCNC: 132 MMOL/L (ref 136–145)
SODIUM SERPL-SCNC: 133 MMOL/L (ref 136–145)
SODIUM SERPL-SCNC: 134 MMOL/L (ref 136–145)
SODIUM SERPL-SCNC: 135 MMOL/L (ref 136–145)
SODIUM SERPL-SCNC: 136 MMOL/L (ref 136–145)
SODIUM SERPL-SCNC: 137 MMOL/L (ref 136–145)
SODIUM SERPL-SCNC: 138 MMOL/L (ref 136–145)
SODIUM SERPL-SCNC: 147 MMOL/L (ref 134–144)
SOURCE, COVRS: NORMAL
SOURCE, COVRS: NORMAL
SP GR UR REFRACTOMETRY: 1.01 (ref 1–1.03)
SP GR UR: 1.01 (ref 1–1.03)
SPECIMEN EXP DATE BLD: NORMAL
SPECIMEN SOURCE, FCOV2M: NORMAL
SPECIMEN SOURCE, FCOV2M: NORMAL
SPECIMEN STATUS REPORT, ROLRST: NORMAL
SPECIMEN TYPE, XMCV1T: NORMAL
SPECIMEN TYPE: ABNORMAL
STATUS OF UNIT,%ST: NORMAL
THERAPEUTIC RANGE,PTTT: ABNORMAL SECS (ref 58–77)
THERAPEUTIC RANGE,PTTT: NORMAL SECS (ref 58–77)
TOTAL RESP. RATE, ITRR: 22
TOTAL RESP. RATE, ITRR: 24
TROPONIN I SERPL-MCNC: 0.13 NG/ML
TROPONIN I SERPL-MCNC: 0.16 NG/ML
TROPONIN I SERPL-MCNC: 1.79 NG/ML
TROPONIN I SERPL-MCNC: 2.28 NG/ML
TROPONIN I SERPL-MCNC: 2.31 NG/ML
TROPONIN I SERPL-MCNC: 2.44 NG/ML
TROPONIN I SERPL-MCNC: 2.55 NG/ML
TROPONIN I SERPL-MCNC: 2.78 NG/ML
TROPONIN I SERPL-MCNC: 4.35 NG/ML
UNIT DIVISION, %UDIV: 0
UR CULT HOLD, URHOLD: NORMAL
UROBILINOGEN UR QL STRIP.AUTO: 0.2 EU/DL (ref 0.2–1)
UROBILINOGEN UR STRIP-MCNC: 0.2 MG/DL (ref 0.2–1)
VENTRICULAR RATE, ECG03: 105 BPM
VENTRICULAR RATE, ECG03: 113 BPM
VENTRICULAR RATE, ECG03: 115 BPM
VENTRICULAR RATE, ECG03: 116 BPM
VENTRICULAR RATE, ECG03: 126 BPM
VENTRICULAR RATE, ECG03: 154 BPM
VENTRICULAR RATE, ECG03: 91 BPM
VENTRICULAR RATE, ECG03: 92 BPM
VENTRICULAR RATE, ECG03: 92 BPM
VENTRICULAR RATE, ECG03: 93 BPM
VENTRICULAR RATE, ECG03: 96 BPM
VENTRICULAR RATE, ECG03: 97 BPM
VENTRICULAR RATE, ECG03: 98 BPM
WBC # BLD AUTO: 11.2 K/UL (ref 3.6–11)
WBC # BLD AUTO: 11.6 K/UL (ref 3.6–11)
WBC # BLD AUTO: 12.2 K/UL (ref 3.6–11)
WBC # BLD AUTO: 12.7 K/UL (ref 3.6–11)
WBC # BLD AUTO: 12.8 K/UL (ref 3.6–11)
WBC # BLD AUTO: 13.5 K/UL (ref 3.6–11)
WBC # BLD AUTO: 13.5 K/UL (ref 3.6–11)
WBC # BLD AUTO: 18.2 K/UL (ref 3.6–11)
WBC # BLD AUTO: 21.3 K/UL (ref 3.6–11)
WBC # BLD AUTO: 27.1 K/UL (ref 3.6–11)
WBC # BLD AUTO: 30.5 K/UL (ref 3.6–11)
WBC # BLD AUTO: 4.1 K/UL (ref 3.6–11)
WBC # BLD AUTO: 5.1 K/UL (ref 3.6–11)
WBC # BLD AUTO: 5.7 K/UL (ref 3.6–11)
WBC # BLD AUTO: 6 K/UL (ref 3.6–11)
WBC # BLD AUTO: 6.4 K/UL (ref 3.6–11)
WBC # BLD AUTO: 6.6 K/UL (ref 3.6–11)
WBC # BLD AUTO: 6.6 K/UL (ref 3.6–11)
WBC # BLD AUTO: 7 K/UL (ref 3.6–11)
WBC # BLD AUTO: 7.7 X10E3/UL (ref 3.4–10.8)
WBC # BLD AUTO: 7.8 K/UL (ref 3.6–11)
WBC # BLD AUTO: 8.5 K/UL (ref 3.6–11)
WBC # BLD AUTO: 9.2 K/UL (ref 3.6–11)
WBC # BLD AUTO: 9.3 K/UL (ref 3.6–11)
WBC # BLD AUTO: 9.4 K/UL (ref 3.6–11)
WBC #/AREA URNS HPF: >30 /HPF (ref 0–5)
WBC MORPH BLD: ABNORMAL
WBC URNS QL MICRO: >100 /HPF (ref 0–4)
WBC URNS QL MICRO: ABNORMAL /HPF (ref 0–4)

## 2020-01-01 PROCEDURE — 74011250637 HC RX REV CODE- 250/637: Performed by: INTERNAL MEDICINE

## 2020-01-01 PROCEDURE — 74011000250 HC RX REV CODE- 250: Performed by: STUDENT IN AN ORGANIZED HEALTH CARE EDUCATION/TRAINING PROGRAM

## 2020-01-01 PROCEDURE — 02HV33Z INSERTION OF INFUSION DEVICE INTO SUPERIOR VENA CAVA, PERCUTANEOUS APPROACH: ICD-10-PCS | Performed by: INTERNAL MEDICINE

## 2020-01-01 PROCEDURE — P9045 ALBUMIN (HUMAN), 5%, 250 ML: HCPCS | Performed by: INTERNAL MEDICINE

## 2020-01-01 PROCEDURE — 74011250637 HC RX REV CODE- 250/637: Performed by: STUDENT IN AN ORGANIZED HEALTH CARE EDUCATION/TRAINING PROGRAM

## 2020-01-01 PROCEDURE — 85025 COMPLETE CBC W/AUTO DIFF WBC: CPT

## 2020-01-01 PROCEDURE — 74011250636 HC RX REV CODE- 250/636: Performed by: INTERNAL MEDICINE

## 2020-01-01 PROCEDURE — 83880 ASSAY OF NATRIURETIC PEPTIDE: CPT

## 2020-01-01 PROCEDURE — 96360 HYDRATION IV INFUSION INIT: CPT

## 2020-01-01 PROCEDURE — 74011250637 HC RX REV CODE- 250/637: Performed by: SURGERY

## 2020-01-01 PROCEDURE — 71250 CT THORAX DX C-: CPT

## 2020-01-01 PROCEDURE — G0299 HHS/HOSPICE OF RN EA 15 MIN: HCPCS

## 2020-01-01 PROCEDURE — 93005 ELECTROCARDIOGRAM TRACING: CPT

## 2020-01-01 PROCEDURE — 74011000258 HC RX REV CODE- 258: Performed by: SURGERY

## 2020-01-01 PROCEDURE — 3331090001 HH PPS REVENUE CREDIT

## 2020-01-01 PROCEDURE — 99233 SBSQ HOSP IP/OBS HIGH 50: CPT | Performed by: INTERNAL MEDICINE

## 2020-01-01 PROCEDURE — 87040 BLOOD CULTURE FOR BACTERIA: CPT

## 2020-01-01 PROCEDURE — 74011250636 HC RX REV CODE- 250/636: Performed by: HOSPITALIST

## 2020-01-01 PROCEDURE — G0151 HHCP-SERV OF PT,EA 15 MIN: HCPCS

## 2020-01-01 PROCEDURE — 3331090002 HH PPS REVENUE DEBIT

## 2020-01-01 PROCEDURE — 84100 ASSAY OF PHOSPHORUS: CPT

## 2020-01-01 PROCEDURE — 74011250637 HC RX REV CODE- 250/637: Performed by: FAMILY MEDICINE

## 2020-01-01 PROCEDURE — 74011250636 HC RX REV CODE- 250/636: Performed by: STUDENT IN AN ORGANIZED HEALTH CARE EDUCATION/TRAINING PROGRAM

## 2020-01-01 PROCEDURE — 80053 COMPREHEN METABOLIC PANEL: CPT

## 2020-01-01 PROCEDURE — 87086 URINE CULTURE/COLONY COUNT: CPT

## 2020-01-01 PROCEDURE — 97161 PT EVAL LOW COMPLEX 20 MIN: CPT

## 2020-01-01 PROCEDURE — 99285 EMERGENCY DEPT VISIT HI MDM: CPT

## 2020-01-01 PROCEDURE — 74011250636 HC RX REV CODE- 250/636: Performed by: ANESTHESIOLOGY

## 2020-01-01 PROCEDURE — 74011000250 HC RX REV CODE- 250: Performed by: INTERNAL MEDICINE

## 2020-01-01 PROCEDURE — 92610 EVALUATE SWALLOWING FUNCTION: CPT

## 2020-01-01 PROCEDURE — 74011250636 HC RX REV CODE- 250/636: Performed by: PHYSICIAN ASSISTANT

## 2020-01-01 PROCEDURE — 74011250637 HC RX REV CODE- 250/637: Performed by: ANESTHESIOLOGY

## 2020-01-01 PROCEDURE — 36415 COLL VENOUS BLD VENIPUNCTURE: CPT

## 2020-01-01 PROCEDURE — P9047 ALBUMIN (HUMAN), 25%, 50ML: HCPCS | Performed by: NURSE PRACTITIONER

## 2020-01-01 PROCEDURE — 80048 BASIC METABOLIC PNL TOTAL CA: CPT

## 2020-01-01 PROCEDURE — 74011250637 HC RX REV CODE- 250/637: Performed by: NURSE PRACTITIONER

## 2020-01-01 PROCEDURE — 36573 INSJ PICC RS&I 5 YR+: CPT | Performed by: HOSPITALIST

## 2020-01-01 PROCEDURE — 74011250636 HC RX REV CODE- 250/636: Performed by: SURGERY

## 2020-01-01 PROCEDURE — 74011000258 HC RX REV CODE- 258: Performed by: INTERNAL MEDICINE

## 2020-01-01 PROCEDURE — 97116 GAIT TRAINING THERAPY: CPT

## 2020-01-01 PROCEDURE — G0152 HHCP-SERV OF OT,EA 15 MIN: HCPCS

## 2020-01-01 PROCEDURE — 74011250636 HC RX REV CODE- 250/636: Performed by: FAMILY MEDICINE

## 2020-01-01 PROCEDURE — 83605 ASSAY OF LACTIC ACID: CPT

## 2020-01-01 PROCEDURE — 82962 GLUCOSE BLOOD TEST: CPT

## 2020-01-01 PROCEDURE — 65660000000 HC RM CCU STEPDOWN

## 2020-01-01 PROCEDURE — 99232 SBSQ HOSP IP/OBS MODERATE 35: CPT | Performed by: SPECIALIST

## 2020-01-01 PROCEDURE — 74011636637 HC RX REV CODE- 636/637: Performed by: SURGERY

## 2020-01-01 PROCEDURE — G0157 HHC PT ASSISTANT EA 15: HCPCS

## 2020-01-01 PROCEDURE — 83735 ASSAY OF MAGNESIUM: CPT

## 2020-01-01 PROCEDURE — 87186 SC STD MICRODIL/AGAR DIL: CPT

## 2020-01-01 PROCEDURE — 74011250637 HC RX REV CODE- 250/637: Performed by: HOSPITALIST

## 2020-01-01 PROCEDURE — 87077 CULTURE AEROBIC IDENTIFY: CPT

## 2020-01-01 PROCEDURE — 3331090003 HH PPS REVENUE ADJ

## 2020-01-01 PROCEDURE — 74011250636 HC RX REV CODE- 250/636: Performed by: NURSE ANESTHETIST, CERTIFIED REGISTERED

## 2020-01-01 PROCEDURE — 74011000258 HC RX REV CODE- 258: Performed by: EMERGENCY MEDICINE

## 2020-01-01 PROCEDURE — G0300 HHS/HOSPICE OF LPN EA 15 MIN: HCPCS

## 2020-01-01 PROCEDURE — 74011636637 HC RX REV CODE- 636/637: Performed by: INTERNAL MEDICINE

## 2020-01-01 PROCEDURE — 74011000250 HC RX REV CODE- 250: Performed by: SURGERY

## 2020-01-01 PROCEDURE — 94761 N-INVAS EAR/PLS OXIMETRY MLT: CPT

## 2020-01-01 PROCEDURE — 74011636637 HC RX REV CODE- 636/637: Performed by: HOSPITALIST

## 2020-01-01 PROCEDURE — 74011636637 HC RX REV CODE- 636/637: Performed by: NURSE PRACTITIONER

## 2020-01-01 PROCEDURE — 96361 HYDRATE IV INFUSION ADD-ON: CPT

## 2020-01-01 PROCEDURE — 83036 HEMOGLOBIN GLYCOSYLATED A1C: CPT

## 2020-01-01 PROCEDURE — 65210000002 HC RM PRIVATE GYN

## 2020-01-01 PROCEDURE — 97165 OT EVAL LOW COMPLEX 30 MIN: CPT | Performed by: OCCUPATIONAL THERAPIST

## 2020-01-01 PROCEDURE — 71045 X-RAY EXAM CHEST 1 VIEW: CPT

## 2020-01-01 PROCEDURE — 97110 THERAPEUTIC EXERCISES: CPT

## 2020-01-01 PROCEDURE — 82803 BLOOD GASES ANY COMBINATION: CPT

## 2020-01-01 PROCEDURE — 74011250637 HC RX REV CODE- 250/637: Performed by: EMERGENCY MEDICINE

## 2020-01-01 PROCEDURE — 77030038269 HC DRN EXT URIN PURWCK BARD -A

## 2020-01-01 PROCEDURE — 87635 SARS-COV-2 COVID-19 AMP PRB: CPT

## 2020-01-01 PROCEDURE — 77030020365 HC SOL INJ SOD CL 0.9% 50ML

## 2020-01-01 PROCEDURE — 96365 THER/PROPH/DIAG IV INF INIT: CPT

## 2020-01-01 PROCEDURE — 51701 INSERT BLADDER CATHETER: CPT

## 2020-01-01 PROCEDURE — 77030013797 HC KT TRNSDUC PRSSR EDWD -A

## 2020-01-01 PROCEDURE — 74011250636 HC RX REV CODE- 250/636: Performed by: NURSE PRACTITIONER

## 2020-01-01 PROCEDURE — 81001 URINALYSIS AUTO W/SCOPE: CPT

## 2020-01-01 PROCEDURE — 96374 THER/PROPH/DIAG INJ IV PUSH: CPT

## 2020-01-01 PROCEDURE — 82040 ASSAY OF SERUM ALBUMIN: CPT

## 2020-01-01 PROCEDURE — 99284 EMERGENCY DEPT VISIT MOD MDM: CPT

## 2020-01-01 PROCEDURE — 99233 SBSQ HOSP IP/OBS HIGH 50: CPT | Performed by: SPECIALIST

## 2020-01-01 PROCEDURE — 84484 ASSAY OF TROPONIN QUANT: CPT

## 2020-01-01 PROCEDURE — 76937 US GUIDE VASCULAR ACCESS: CPT

## 2020-01-01 PROCEDURE — 74011250637 HC RX REV CODE- 250/637: Performed by: SPECIALIST

## 2020-01-01 PROCEDURE — 84145 PROCALCITONIN (PCT): CPT

## 2020-01-01 PROCEDURE — 74011636637 HC RX REV CODE- 636/637: Performed by: ANESTHESIOLOGY

## 2020-01-01 PROCEDURE — 93308 TTE F-UP OR LMTD: CPT | Performed by: INTERNAL MEDICINE

## 2020-01-01 PROCEDURE — 5A1935Z RESPIRATORY VENTILATION, LESS THAN 24 CONSECUTIVE HOURS: ICD-10-PCS | Performed by: ANESTHESIOLOGY

## 2020-01-01 PROCEDURE — 74011000258 HC RX REV CODE- 258: Performed by: ANESTHESIOLOGY

## 2020-01-01 PROCEDURE — 36600 WITHDRAWAL OF ARTERIAL BLOOD: CPT

## 2020-01-01 PROCEDURE — 85610 PROTHROMBIN TIME: CPT

## 2020-01-01 PROCEDURE — 74011636637 HC RX REV CODE- 636/637: Performed by: FAMILY MEDICINE

## 2020-01-01 PROCEDURE — 85730 THROMBOPLASTIN TIME PARTIAL: CPT

## 2020-01-01 PROCEDURE — 65620000000 HC RM CCU GENERAL

## 2020-01-01 PROCEDURE — 74011000258 HC RX REV CODE- 258: Performed by: STUDENT IN AN ORGANIZED HEALTH CARE EDUCATION/TRAINING PROGRAM

## 2020-01-01 PROCEDURE — 74018 RADEX ABDOMEN 1 VIEW: CPT

## 2020-01-01 PROCEDURE — 5A09357 ASSISTANCE WITH RESPIRATORY VENTILATION, LESS THAN 24 CONSECUTIVE HOURS, CONTINUOUS POSITIVE AIRWAY PRESSURE: ICD-10-PCS | Performed by: EMERGENCY MEDICINE

## 2020-01-01 PROCEDURE — 94760 N-INVAS EAR/PLS OXIMETRY 1: CPT

## 2020-01-01 PROCEDURE — 99283 EMERGENCY DEPT VISIT LOW MDM: CPT

## 2020-01-01 PROCEDURE — 65270000032 HC RM SEMIPRIVATE

## 2020-01-01 PROCEDURE — 87205 SMEAR GRAM STAIN: CPT

## 2020-01-01 PROCEDURE — 97165 OT EVAL LOW COMPLEX 30 MIN: CPT

## 2020-01-01 PROCEDURE — 77030040361 HC SLV COMPR DVT MDII -B: Performed by: SURGERY

## 2020-01-01 PROCEDURE — P9047 ALBUMIN (HUMAN), 25%, 50ML: HCPCS | Performed by: INTERNAL MEDICINE

## 2020-01-01 PROCEDURE — 65270000029 HC RM PRIVATE

## 2020-01-01 PROCEDURE — 99232 SBSQ HOSP IP/OBS MODERATE 35: CPT | Performed by: INTERNAL MEDICINE

## 2020-01-01 PROCEDURE — P9016 RBC LEUKOCYTES REDUCED: HCPCS

## 2020-01-01 PROCEDURE — 77030005513 HC CATH URETH FOL11 MDII -B

## 2020-01-01 PROCEDURE — 76060000032 HC ANESTHESIA 0.5 TO 1 HR: Performed by: SURGERY

## 2020-01-01 PROCEDURE — C1751 CATH, INF, PER/CENT/MIDLINE: HCPCS

## 2020-01-01 PROCEDURE — 87075 CULTR BACTERIA EXCEPT BLOOD: CPT

## 2020-01-01 PROCEDURE — 74011250636 HC RX REV CODE- 250/636: Performed by: EMERGENCY MEDICINE

## 2020-01-01 PROCEDURE — 97530 THERAPEUTIC ACTIVITIES: CPT

## 2020-01-01 PROCEDURE — 86923 COMPATIBILITY TEST ELECTRIC: CPT

## 2020-01-01 PROCEDURE — 97535 SELF CARE MNGMENT TRAINING: CPT

## 2020-01-01 PROCEDURE — 74011000250 HC RX REV CODE- 250: Performed by: ANESTHESIOLOGY

## 2020-01-01 PROCEDURE — 85027 COMPLETE CBC AUTOMATED: CPT

## 2020-01-01 PROCEDURE — 93308 TTE F-UP OR LMTD: CPT

## 2020-01-01 PROCEDURE — 99222 1ST HOSP IP/OBS MODERATE 55: CPT | Performed by: SPECIALIST

## 2020-01-01 PROCEDURE — 86900 BLOOD TYPING SEROLOGIC ABO: CPT

## 2020-01-01 PROCEDURE — 82533 TOTAL CORTISOL: CPT

## 2020-01-01 PROCEDURE — 93306 TTE W/DOPPLER COMPLETE: CPT

## 2020-01-01 PROCEDURE — 77030019905 HC CATH URETH INTMIT MDII -A

## 2020-01-01 PROCEDURE — 82140 ASSAY OF AMMONIA: CPT

## 2020-01-01 PROCEDURE — 0BH17EZ INSERTION OF ENDOTRACHEAL AIRWAY INTO TRACHEA, VIA NATURAL OR ARTIFICIAL OPENING: ICD-10-PCS | Performed by: ANESTHESIOLOGY

## 2020-01-01 PROCEDURE — 74011000258 HC RX REV CODE- 258: Performed by: HOSPITALIST

## 2020-01-01 PROCEDURE — 36573 INSJ PICC RS&I 5 YR+: CPT | Performed by: ANESTHESIOLOGY

## 2020-01-01 PROCEDURE — 77030008771 HC TU NG SALEM SUMP -A

## 2020-01-01 PROCEDURE — 97530 THERAPEUTIC ACTIVITIES: CPT | Performed by: OCCUPATIONAL THERAPIST

## 2020-01-01 PROCEDURE — 400013 HH SOC

## 2020-01-01 PROCEDURE — 77030010507 HC ADH SKN DERMBND J&J -B: Performed by: SURGERY

## 2020-01-01 PROCEDURE — 71046 X-RAY EXAM CHEST 2 VIEWS: CPT

## 2020-01-01 PROCEDURE — 05PY33Z REMOVAL OF INFUSION DEVICE FROM UPPER VEIN, PERCUTANEOUS APPROACH: ICD-10-PCS | Performed by: SURGERY

## 2020-01-01 PROCEDURE — 99231 SBSQ HOSP IP/OBS SF/LOW 25: CPT | Performed by: SURGERY

## 2020-01-01 PROCEDURE — 74011000258 HC RX REV CODE- 258: Performed by: PHYSICIAN ASSISTANT

## 2020-01-01 PROCEDURE — 36430 TRANSFUSION BLD/BLD COMPNT: CPT

## 2020-01-01 PROCEDURE — 77030011640 HC PAD GRND REM COVD -A: Performed by: SURGERY

## 2020-01-01 PROCEDURE — 77010033678 HC OXYGEN DAILY

## 2020-01-01 PROCEDURE — 77030040361 HC SLV COMPR DVT MDII -B

## 2020-01-01 PROCEDURE — 94660 CPAP INITIATION&MGMT: CPT

## 2020-01-01 PROCEDURE — 76210000006 HC OR PH I REC 0.5 TO 1 HR: Performed by: SURGERY

## 2020-01-01 PROCEDURE — 02HV33Z INSERTION OF INFUSION DEVICE INTO SUPERIOR VENA CAVA, PERCUTANEOUS APPROACH: ICD-10-PCS | Performed by: HOSPITALIST

## 2020-01-01 PROCEDURE — 74011250637 HC RX REV CODE- 250/637: Performed by: PHYSICIAN ASSISTANT

## 2020-01-01 PROCEDURE — 77030040922 HC BLNKT HYPOTHRM STRY -A

## 2020-01-01 PROCEDURE — 97535 SELF CARE MNGMENT TRAINING: CPT | Performed by: OCCUPATIONAL THERAPIST

## 2020-01-01 PROCEDURE — 77030002933 HC SUT MCRYL J&J -A: Performed by: SURGERY

## 2020-01-01 PROCEDURE — 30233N1 TRANSFUSION OF NONAUTOLOGOUS RED BLOOD CELLS INTO PERIPHERAL VEIN, PERCUTANEOUS APPROACH: ICD-10-PCS | Performed by: INTERNAL MEDICINE

## 2020-01-01 PROCEDURE — 76010000138 HC OR TIME 0.5 TO 1 HR: Performed by: SURGERY

## 2020-01-01 PROCEDURE — 0JPT0WZ REMOVAL OF TOTALLY IMPLANTABLE VASCULAR ACCESS DEVICE FROM TRUNK SUBCUTANEOUS TISSUE AND FASCIA, OPEN APPROACH: ICD-10-PCS | Performed by: SURGERY

## 2020-01-01 RX ORDER — NITROFURANTOIN 25; 75 MG/1; MG/1
100 CAPSULE ORAL 2 TIMES DAILY
Qty: 14 CAP | Refills: 0 | Status: SHIPPED | OUTPATIENT
Start: 2020-01-01 | End: 2020-01-01

## 2020-01-01 RX ORDER — ASPIRIN 81 MG/1
81 TABLET ORAL DAILY
Status: DISCONTINUED | OUTPATIENT
Start: 2020-01-01 | End: 2020-01-01 | Stop reason: HOSPADM

## 2020-01-01 RX ORDER — ALBUMIN HUMAN 50 G/1000ML
25 SOLUTION INTRAVENOUS ONCE
Status: COMPLETED | OUTPATIENT
Start: 2020-01-01 | End: 2020-01-01

## 2020-01-01 RX ORDER — THERA TABS 400 MCG
1 TAB ORAL DAILY
Status: DISCONTINUED | OUTPATIENT
Start: 2020-01-01 | End: 2020-01-01 | Stop reason: HOSPADM

## 2020-01-01 RX ORDER — FAMOTIDINE 20 MG/1
20 TABLET, FILM COATED ORAL 2 TIMES DAILY
Status: DISCONTINUED | OUTPATIENT
Start: 2020-01-01 | End: 2020-01-01

## 2020-01-01 RX ORDER — FUROSEMIDE 10 MG/ML
20 INJECTION INTRAMUSCULAR; INTRAVENOUS ONCE
Status: COMPLETED | OUTPATIENT
Start: 2020-01-01 | End: 2020-01-01

## 2020-01-01 RX ORDER — LEVOTHYROXINE SODIUM 88 UG/1
88 TABLET ORAL
Status: DISCONTINUED | OUTPATIENT
Start: 2020-01-01 | End: 2020-01-01

## 2020-01-01 RX ORDER — SODIUM CHLORIDE 9 MG/ML
50 INJECTION, SOLUTION INTRAVENOUS CONTINUOUS
Status: DISPENSED | OUTPATIENT
Start: 2020-01-01 | End: 2020-01-01

## 2020-01-01 RX ORDER — LEVOFLOXACIN 5 MG/ML
750 INJECTION, SOLUTION INTRAVENOUS
Status: COMPLETED | OUTPATIENT
Start: 2020-01-01 | End: 2020-01-01

## 2020-01-01 RX ORDER — MORPHINE SULFATE 2 MG/ML
2 INJECTION, SOLUTION INTRAMUSCULAR; INTRAVENOUS ONCE
Status: COMPLETED | OUTPATIENT
Start: 2020-01-01 | End: 2020-01-01

## 2020-01-01 RX ORDER — INSULIN GLARGINE 100 [IU]/ML
8 INJECTION, SOLUTION SUBCUTANEOUS ONCE
Status: COMPLETED | OUTPATIENT
Start: 2020-01-01 | End: 2020-01-01

## 2020-01-01 RX ORDER — MIDAZOLAM HYDROCHLORIDE 1 MG/ML
INJECTION, SOLUTION INTRAMUSCULAR; INTRAVENOUS AS NEEDED
Status: DISCONTINUED | OUTPATIENT
Start: 2020-01-01 | End: 2020-01-01 | Stop reason: HOSPADM

## 2020-01-01 RX ORDER — MELATONIN
1000 DAILY
Status: DISCONTINUED | OUTPATIENT
Start: 2020-01-01 | End: 2020-01-01 | Stop reason: HOSPADM

## 2020-01-01 RX ORDER — FUROSEMIDE 40 MG/1
80 TABLET ORAL DAILY
Status: DISCONTINUED | OUTPATIENT
Start: 2020-01-01 | End: 2020-01-01 | Stop reason: HOSPADM

## 2020-01-01 RX ORDER — POTASSIUM CHLORIDE 750 MG/1
40 TABLET, FILM COATED, EXTENDED RELEASE ORAL DAILY
Status: DISCONTINUED | OUTPATIENT
Start: 2020-01-01 | End: 2020-01-01

## 2020-01-01 RX ORDER — FUROSEMIDE 40 MG/1
40 TABLET ORAL 2 TIMES DAILY
Status: DISCONTINUED | OUTPATIENT
Start: 2020-01-01 | End: 2020-01-01 | Stop reason: HOSPADM

## 2020-01-01 RX ORDER — ONDANSETRON 2 MG/ML
4 INJECTION INTRAMUSCULAR; INTRAVENOUS
Status: DISCONTINUED | OUTPATIENT
Start: 2020-01-01 | End: 2020-01-01 | Stop reason: HOSPADM

## 2020-01-01 RX ORDER — INSULIN GLARGINE 100 [IU]/ML
8 INJECTION, SOLUTION SUBCUTANEOUS ONCE
Status: DISCONTINUED | OUTPATIENT
Start: 2020-01-01 | End: 2020-01-01 | Stop reason: SDUPTHER

## 2020-01-01 RX ORDER — DOBUTAMINE HYDROCHLORIDE 200 MG/100ML
0-10 INJECTION INTRAVENOUS
Status: DISCONTINUED | OUTPATIENT
Start: 2020-01-01 | End: 2020-01-01

## 2020-01-01 RX ORDER — HEPARIN SODIUM 5000 [USP'U]/ML
60 INJECTION, SOLUTION INTRAVENOUS; SUBCUTANEOUS ONCE
Status: COMPLETED | OUTPATIENT
Start: 2020-01-01 | End: 2020-01-01

## 2020-01-01 RX ORDER — HEPARIN SODIUM 10000 [USP'U]/100ML
12-25 INJECTION, SOLUTION INTRAVENOUS
Status: DISCONTINUED | OUTPATIENT
Start: 2020-01-01 | End: 2020-01-01

## 2020-01-01 RX ORDER — LEVOTHYROXINE SODIUM 88 UG/1
88 TABLET ORAL
Status: DISCONTINUED | OUTPATIENT
Start: 2020-01-01 | End: 2020-01-01 | Stop reason: HOSPADM

## 2020-01-01 RX ORDER — LEVOTHYROXINE SODIUM 20 UG/ML
88 INJECTION, SOLUTION INTRAVENOUS EVERY 24 HOURS
Status: DISCONTINUED | OUTPATIENT
Start: 2020-01-01 | End: 2020-01-01 | Stop reason: DRUGHIGH

## 2020-01-01 RX ORDER — CARVEDILOL 6.25 MG/1
6.25 TABLET ORAL 2 TIMES DAILY
Status: DISCONTINUED | OUTPATIENT
Start: 2020-01-01 | End: 2020-01-01

## 2020-01-01 RX ORDER — ONDANSETRON HYDROCHLORIDE 8 MG/1
8 TABLET, FILM COATED ORAL
Refills: 5 | COMMUNITY
Start: 2019-01-01 | End: 2020-01-01

## 2020-01-01 RX ORDER — MAGNESIUM SULFATE 100 %
4 CRYSTALS MISCELLANEOUS AS NEEDED
Status: DISCONTINUED | OUTPATIENT
Start: 2020-01-01 | End: 2020-01-01

## 2020-01-01 RX ORDER — ACETAMINOPHEN 325 MG/1
650 TABLET ORAL
Status: DISCONTINUED | OUTPATIENT
Start: 2020-01-01 | End: 2020-01-01 | Stop reason: HOSPADM

## 2020-01-01 RX ORDER — POLYETHYLENE GLYCOL 3350 17 G/17G
17 POWDER, FOR SOLUTION ORAL DAILY
Status: DISCONTINUED | OUTPATIENT
Start: 2020-01-01 | End: 2020-01-01 | Stop reason: HOSPADM

## 2020-01-01 RX ORDER — SODIUM CHLORIDE 0.9 % (FLUSH) 0.9 %
5-40 SYRINGE (ML) INJECTION AS NEEDED
Status: DISCONTINUED | OUTPATIENT
Start: 2020-01-01 | End: 2020-01-01 | Stop reason: HOSPADM

## 2020-01-01 RX ORDER — NITROGLYCERIN 0.4 MG/1
0.4 TABLET SUBLINGUAL AS NEEDED
Status: DISCONTINUED | OUTPATIENT
Start: 2020-01-01 | End: 2020-01-01 | Stop reason: HOSPADM

## 2020-01-01 RX ORDER — DOCUSATE SODIUM 100 MG/1
100 CAPSULE, LIQUID FILLED ORAL DAILY
COMMUNITY
End: 2020-01-01

## 2020-01-01 RX ORDER — MIDODRINE HYDROCHLORIDE 5 MG/1
15 TABLET ORAL
Status: DISCONTINUED | OUTPATIENT
Start: 2020-01-01 | End: 2020-01-01

## 2020-01-01 RX ORDER — LEVOTHYROXINE SODIUM 20 UG/ML
66 INJECTION, SOLUTION INTRAVENOUS EVERY 24 HOURS
Status: DISCONTINUED | OUTPATIENT
Start: 2020-08-28 | End: 2020-01-01 | Stop reason: SDUPTHER

## 2020-01-01 RX ORDER — FUROSEMIDE 10 MG/ML
40 INJECTION INTRAMUSCULAR; INTRAVENOUS ONCE
Status: COMPLETED | OUTPATIENT
Start: 2020-01-01 | End: 2020-01-01

## 2020-01-01 RX ORDER — SODIUM CHLORIDE 9 MG/ML
75 INJECTION, SOLUTION INTRAVENOUS CONTINUOUS
Status: DISCONTINUED | OUTPATIENT
Start: 2020-01-01 | End: 2020-01-01

## 2020-01-01 RX ORDER — CALCIUM CARBONATE 200(500)MG
200 TABLET,CHEWABLE ORAL
Status: DISCONTINUED | OUTPATIENT
Start: 2020-01-01 | End: 2020-01-01 | Stop reason: HOSPADM

## 2020-01-01 RX ORDER — HEPARIN SODIUM 5000 [USP'U]/ML
5000 INJECTION, SOLUTION INTRAVENOUS; SUBCUTANEOUS
COMMUNITY
End: 2020-01-01

## 2020-01-01 RX ORDER — INSULIN LISPRO 100 [IU]/ML
15 INJECTION, SOLUTION INTRAVENOUS; SUBCUTANEOUS ONCE
Status: COMPLETED | OUTPATIENT
Start: 2020-01-01 | End: 2020-01-01

## 2020-01-01 RX ORDER — AMOXICILLIN 250 MG
1 CAPSULE ORAL DAILY
Status: DISCONTINUED | OUTPATIENT
Start: 2020-01-01 | End: 2020-01-01 | Stop reason: HOSPADM

## 2020-01-01 RX ORDER — INSULIN GLARGINE 100 [IU]/ML
8 INJECTION, SOLUTION SUBCUTANEOUS DAILY
Status: DISCONTINUED | OUTPATIENT
Start: 2020-01-01 | End: 2020-01-01

## 2020-01-01 RX ORDER — ADHESIVE BANDAGE
30 BANDAGE TOPICAL DAILY PRN
Status: DISCONTINUED | OUTPATIENT
Start: 2020-01-01 | End: 2020-01-01 | Stop reason: HOSPADM

## 2020-01-01 RX ORDER — LISINOPRIL 5 MG/1
5 TABLET ORAL DAILY
Status: DISCONTINUED | OUTPATIENT
Start: 2020-01-01 | End: 2020-01-01 | Stop reason: HOSPADM

## 2020-01-01 RX ORDER — ASPIRIN 81 MG/1
81 TABLET ORAL DAILY
Status: DISCONTINUED | OUTPATIENT
Start: 2020-01-01 | End: 2020-01-01

## 2020-01-01 RX ORDER — PALBOCICLIB 125 MG/1
CAPSULE ORAL
COMMUNITY
Start: 2018-10-03 | End: 2020-01-01

## 2020-01-01 RX ORDER — INSULIN GLARGINE 100 [IU]/ML
13 INJECTION, SOLUTION SUBCUTANEOUS DAILY
Status: DISCONTINUED | OUTPATIENT
Start: 2020-01-01 | End: 2020-01-01

## 2020-01-01 RX ORDER — MIDODRINE HYDROCHLORIDE 5 MG/1
10 TABLET ORAL
Status: DISCONTINUED | OUTPATIENT
Start: 2020-01-01 | End: 2020-01-01

## 2020-01-01 RX ORDER — INSULIN LISPRO 100 [IU]/ML
8 INJECTION, SOLUTION INTRAVENOUS; SUBCUTANEOUS ONCE
Status: COMPLETED | OUTPATIENT
Start: 2020-01-01 | End: 2020-01-01

## 2020-01-01 RX ORDER — CARVEDILOL 6.25 MG/1
6.25 TABLET ORAL 2 TIMES DAILY WITH MEALS
COMMUNITY

## 2020-01-01 RX ORDER — FUROSEMIDE 40 MG/1
80 TABLET ORAL DAILY
Qty: 60 TAB | Refills: 0 | Status: ON HOLD | OUTPATIENT
Start: 2020-01-01 | End: 2020-01-01

## 2020-01-01 RX ORDER — CARVEDILOL 6.25 MG/1
6.25 TABLET ORAL 2 TIMES DAILY
Status: DISCONTINUED | OUTPATIENT
Start: 2020-01-01 | End: 2020-01-01 | Stop reason: HOSPADM

## 2020-01-01 RX ORDER — FENTANYL CITRATE 50 UG/ML
25 INJECTION, SOLUTION INTRAMUSCULAR; INTRAVENOUS
Status: DISCONTINUED | OUTPATIENT
Start: 2020-01-01 | End: 2020-01-01 | Stop reason: HOSPADM

## 2020-01-01 RX ORDER — ONDANSETRON 2 MG/ML
4 INJECTION INTRAMUSCULAR; INTRAVENOUS AS NEEDED
Status: DISCONTINUED | OUTPATIENT
Start: 2020-01-01 | End: 2020-01-01 | Stop reason: HOSPADM

## 2020-01-01 RX ORDER — SCOLOPAMINE TRANSDERMAL SYSTEM 1 MG/1
1 PATCH, EXTENDED RELEASE TRANSDERMAL
Status: DISCONTINUED | OUTPATIENT
Start: 2020-01-01 | End: 2020-01-01 | Stop reason: HOSPADM

## 2020-01-01 RX ORDER — BISMUTH SUBSALICYLATE 262 MG
1 TABLET,CHEWABLE ORAL DAILY
COMMUNITY

## 2020-01-01 RX ORDER — FUROSEMIDE 40 MG/1
TABLET ORAL
Qty: 30 TAB | Refills: 1 | Status: ON HOLD | OUTPATIENT
Start: 2020-01-01 | End: 2020-01-01 | Stop reason: DRUGHIGH

## 2020-01-01 RX ORDER — INSULIN GLARGINE 100 [IU]/ML
14 INJECTION, SOLUTION SUBCUTANEOUS
Status: DISCONTINUED | OUTPATIENT
Start: 2020-01-01 | End: 2020-01-01 | Stop reason: HOSPADM

## 2020-01-01 RX ORDER — NALOXONE HYDROCHLORIDE 0.4 MG/ML
0.4 INJECTION, SOLUTION INTRAMUSCULAR; INTRAVENOUS; SUBCUTANEOUS AS NEEDED
Status: DISCONTINUED | OUTPATIENT
Start: 2020-01-01 | End: 2020-01-01 | Stop reason: HOSPADM

## 2020-01-01 RX ORDER — ALBUMIN HUMAN 250 G/1000ML
12.5 SOLUTION INTRAVENOUS ONCE
Status: COMPLETED | OUTPATIENT
Start: 2020-01-01 | End: 2020-01-01

## 2020-01-01 RX ORDER — LANOLIN ALCOHOL/MO/W.PET/CERES
3 CREAM (GRAM) TOPICAL
Status: DISCONTINUED | OUTPATIENT
Start: 2020-01-01 | End: 2020-01-01

## 2020-01-01 RX ORDER — NITROFURANTOIN 25; 75 MG/1; MG/1
100 CAPSULE ORAL 2 TIMES DAILY
Qty: 6 CAP | Refills: 0 | Status: SHIPPED | OUTPATIENT
Start: 2020-01-01 | End: 2020-01-01

## 2020-01-01 RX ORDER — ACETAMINOPHEN 500 MG
1000 TABLET ORAL
Status: COMPLETED | OUTPATIENT
Start: 2020-01-01 | End: 2020-01-01

## 2020-01-01 RX ORDER — EVOLOCUMAB 140 MG/ML
INJECTION, SOLUTION SUBCUTANEOUS
Qty: 2 ML | Refills: 2 | Status: SHIPPED | OUTPATIENT
Start: 2020-01-01 | End: 2020-01-01

## 2020-01-01 RX ORDER — FAMOTIDINE 20 MG/1
20 TABLET, FILM COATED ORAL DAILY
Status: DISCONTINUED | OUTPATIENT
Start: 2020-01-01 | End: 2020-01-01

## 2020-01-01 RX ORDER — MAGNESIUM SULFATE HEPTAHYDRATE 40 MG/ML
2 INJECTION, SOLUTION INTRAVENOUS ONCE
Status: COMPLETED | OUTPATIENT
Start: 2020-01-01 | End: 2020-01-01

## 2020-01-01 RX ORDER — INSULIN GLARGINE 100 [IU]/ML
5 INJECTION, SOLUTION SUBCUTANEOUS ONCE
Status: COMPLETED | OUTPATIENT
Start: 2020-01-01 | End: 2020-01-01

## 2020-01-01 RX ORDER — HYDROMORPHONE HYDROCHLORIDE 1 MG/ML
0.2 INJECTION, SOLUTION INTRAMUSCULAR; INTRAVENOUS; SUBCUTANEOUS
Status: DISCONTINUED | OUTPATIENT
Start: 2020-01-01 | End: 2020-01-01 | Stop reason: HOSPADM

## 2020-01-01 RX ORDER — NOREPINEPHRINE BITARTRATE/D5W 8 MG/250ML
.5-3 PLASTIC BAG, INJECTION (ML) INTRAVENOUS
Status: DISCONTINUED | OUTPATIENT
Start: 2020-01-01 | End: 2020-01-01

## 2020-01-01 RX ORDER — CEFDINIR 300 MG/1
300 CAPSULE ORAL 2 TIMES DAILY
Qty: 20 CAP | Refills: 0 | Status: SHIPPED | OUTPATIENT
Start: 2020-01-01 | End: 2020-01-01

## 2020-01-01 RX ORDER — FUROSEMIDE 40 MG/1
40-60 TABLET ORAL EVERY OTHER DAY
COMMUNITY
End: 2020-01-01

## 2020-01-01 RX ORDER — FLUCONAZOLE 150 MG/1
150 TABLET ORAL
Qty: 1 TAB | Refills: 0 | Status: SHIPPED | OUTPATIENT
Start: 2020-01-01 | End: 2020-01-01

## 2020-01-01 RX ORDER — SODIUM CHLORIDE 9 MG/ML
250 INJECTION, SOLUTION INTRAVENOUS AS NEEDED
Status: DISCONTINUED | OUTPATIENT
Start: 2020-01-01 | End: 2020-01-01

## 2020-01-01 RX ORDER — ALBUMIN HUMAN 250 G/1000ML
25 SOLUTION INTRAVENOUS ONCE
Status: DISCONTINUED | OUTPATIENT
Start: 2020-01-01 | End: 2020-01-01

## 2020-01-01 RX ORDER — LEVOFLOXACIN 500 MG/1
500 TABLET, FILM COATED ORAL DAILY
Qty: 1 TAB | Refills: 0 | Status: SHIPPED
Start: 2020-01-01 | End: 2020-01-01

## 2020-01-01 RX ORDER — EVOLOCUMAB 140 MG/ML
140 INJECTION, SOLUTION SUBCUTANEOUS
COMMUNITY

## 2020-01-01 RX ORDER — MAGNESIUM SULFATE 100 %
4 CRYSTALS MISCELLANEOUS AS NEEDED
Status: DISCONTINUED | OUTPATIENT
Start: 2020-01-01 | End: 2020-01-01 | Stop reason: HOSPADM

## 2020-01-01 RX ORDER — MORPHINE SULFATE 2 MG/ML
2 INJECTION, SOLUTION INTRAMUSCULAR; INTRAVENOUS
Status: DISCONTINUED | OUTPATIENT
Start: 2020-01-01 | End: 2020-01-01

## 2020-01-01 RX ORDER — PROPOFOL 10 MG/ML
0-50 VIAL (ML) INTRAVENOUS
Status: DISCONTINUED | OUTPATIENT
Start: 2020-01-01 | End: 2020-01-01

## 2020-01-01 RX ORDER — LORAZEPAM 2 MG/ML
1 INJECTION INTRAMUSCULAR
Status: DISCONTINUED | OUTPATIENT
Start: 2020-01-01 | End: 2020-01-01 | Stop reason: HOSPADM

## 2020-01-01 RX ORDER — ALBUMIN HUMAN 250 G/1000ML
25 SOLUTION INTRAVENOUS ONCE
Status: COMPLETED | OUTPATIENT
Start: 2020-01-01 | End: 2020-01-01

## 2020-01-01 RX ORDER — NOREPINEPHRINE BITARTRATE/D5W 8 MG/250ML
.5-3 PLASTIC BAG, INJECTION (ML) INTRAVENOUS
Status: DISCONTINUED | OUTPATIENT
Start: 2020-01-01 | End: 2020-01-01 | Stop reason: SDUPTHER

## 2020-01-01 RX ORDER — DEXTROSE MONOHYDRATE 100 MG/ML
0-250 INJECTION, SOLUTION INTRAVENOUS AS NEEDED
Status: DISCONTINUED | OUTPATIENT
Start: 2020-01-01 | End: 2020-01-01 | Stop reason: HOSPADM

## 2020-01-01 RX ORDER — SODIUM CHLORIDE 0.9 % (FLUSH) 0.9 %
5-10 SYRINGE (ML) INJECTION AS NEEDED
Status: DISCONTINUED | OUTPATIENT
Start: 2020-01-01 | End: 2020-01-01 | Stop reason: HOSPADM

## 2020-01-01 RX ORDER — QUETIAPINE FUMARATE 25 MG/1
50 TABLET, FILM COATED ORAL
Status: DISCONTINUED | OUTPATIENT
Start: 2020-01-01 | End: 2020-01-01

## 2020-01-01 RX ORDER — INSULIN LISPRO 100 [IU]/ML
INJECTION, SOLUTION INTRAVENOUS; SUBCUTANEOUS
Status: DISCONTINUED | OUTPATIENT
Start: 2020-01-01 | End: 2020-01-01

## 2020-01-01 RX ORDER — PROPOFOL 10 MG/ML
INJECTION, EMULSION INTRAVENOUS
Status: DISCONTINUED | OUTPATIENT
Start: 2020-01-01 | End: 2020-01-01 | Stop reason: HOSPADM

## 2020-01-01 RX ORDER — SODIUM CHLORIDE 0.9 % (FLUSH) 0.9 %
5-40 SYRINGE (ML) INJECTION AS NEEDED
Status: DISCONTINUED | OUTPATIENT
Start: 2020-01-01 | End: 2020-01-01

## 2020-01-01 RX ORDER — MILRINONE LACTATE 0.2 MG/ML
0.38 INJECTION, SOLUTION INTRAVENOUS CONTINUOUS
Status: DISCONTINUED | OUTPATIENT
Start: 2020-01-01 | End: 2020-01-01

## 2020-01-01 RX ORDER — SODIUM CHLORIDE 0.9 % (FLUSH) 0.9 %
5-40 SYRINGE (ML) INJECTION EVERY 8 HOURS
Status: DISCONTINUED | OUTPATIENT
Start: 2020-01-01 | End: 2020-01-01 | Stop reason: HOSPADM

## 2020-01-01 RX ORDER — POLYETHYLENE GLYCOL 3350 17 G/17G
17 POWDER, FOR SOLUTION ORAL 2 TIMES DAILY
Status: DISCONTINUED | OUTPATIENT
Start: 2020-01-01 | End: 2020-01-01 | Stop reason: HOSPADM

## 2020-01-01 RX ORDER — CARVEDILOL 6.25 MG/1
6.25 TABLET ORAL 2 TIMES DAILY WITH MEALS
Qty: 60 TAB | Refills: 0 | Status: SHIPPED
Start: 2020-01-01 | End: 2020-01-01

## 2020-01-01 RX ORDER — CARVEDILOL 12.5 MG/1
TABLET ORAL
Qty: 60 TAB | Refills: 5 | Status: ON HOLD | OUTPATIENT
Start: 2020-01-01 | End: 2020-01-01 | Stop reason: DRUGHIGH

## 2020-01-01 RX ORDER — LIDOCAINE HYDROCHLORIDE 10 MG/ML
0.1 INJECTION, SOLUTION EPIDURAL; INFILTRATION; INTRACAUDAL; PERINEURAL AS NEEDED
Status: DISCONTINUED | OUTPATIENT
Start: 2020-01-01 | End: 2020-01-01 | Stop reason: HOSPADM

## 2020-01-01 RX ORDER — CLOPIDOGREL BISULFATE 75 MG/1
75 TABLET ORAL DAILY
Status: DISCONTINUED | OUTPATIENT
Start: 2020-01-01 | End: 2020-01-01

## 2020-01-01 RX ORDER — CLOPIDOGREL BISULFATE 75 MG/1
75 TABLET ORAL DAILY
Status: DISCONTINUED | OUTPATIENT
Start: 2020-01-01 | End: 2020-01-01 | Stop reason: HOSPADM

## 2020-01-01 RX ORDER — FUROSEMIDE 40 MG/1
40 TABLET ORAL DAILY
COMMUNITY
End: 2020-01-01

## 2020-01-01 RX ORDER — LISINOPRIL 5 MG/1
5 TABLET ORAL DAILY
Qty: 30 TAB | Refills: 0 | Status: SHIPPED | OUTPATIENT
Start: 2020-01-01

## 2020-01-01 RX ORDER — INSULIN LISPRO 100 [IU]/ML
INJECTION, SOLUTION INTRAVENOUS; SUBCUTANEOUS
Status: DISCONTINUED | OUTPATIENT
Start: 2020-01-01 | End: 2020-01-01 | Stop reason: HOSPADM

## 2020-01-01 RX ORDER — LISINOPRIL 5 MG/1
5 TABLET ORAL DAILY
Status: DISCONTINUED | OUTPATIENT
Start: 2020-01-01 | End: 2020-01-01

## 2020-01-01 RX ORDER — VANCOMYCIN/0.9 % SOD CHLORIDE 1.5G/250ML
1500 PLASTIC BAG, INJECTION (ML) INTRAVENOUS ONCE
Status: COMPLETED | OUTPATIENT
Start: 2020-01-01 | End: 2020-01-01

## 2020-01-01 RX ORDER — LEVOTHYROXINE SODIUM 20 UG/ML
66 INJECTION, SOLUTION INTRAVENOUS EVERY 24 HOURS
Status: DISCONTINUED | OUTPATIENT
Start: 2020-08-27 | End: 2020-01-01

## 2020-01-01 RX ORDER — FUROSEMIDE 10 MG/ML
40 INJECTION INTRAMUSCULAR; INTRAVENOUS
Status: COMPLETED | OUTPATIENT
Start: 2020-01-01 | End: 2020-01-01

## 2020-01-01 RX ORDER — EVOLOCUMAB 140 MG/ML
INJECTION, SOLUTION SUBCUTANEOUS
Qty: 2 ML | Refills: 11 | Status: ON HOLD | OUTPATIENT
Start: 2020-01-01 | End: 2020-01-01

## 2020-01-01 RX ORDER — MORPHINE SULFATE 2 MG/ML
2-4 INJECTION, SOLUTION INTRAMUSCULAR; INTRAVENOUS
Status: DISCONTINUED | OUTPATIENT
Start: 2020-01-01 | End: 2020-01-01 | Stop reason: HOSPADM

## 2020-01-01 RX ORDER — PROPOFOL 10 MG/ML
INJECTION, EMULSION INTRAVENOUS AS NEEDED
Status: DISCONTINUED | OUTPATIENT
Start: 2020-01-01 | End: 2020-01-01 | Stop reason: HOSPADM

## 2020-01-01 RX ORDER — CEFDINIR 300 MG/1
300 CAPSULE ORAL ONCE
Status: COMPLETED | OUTPATIENT
Start: 2020-01-01 | End: 2020-01-01

## 2020-01-01 RX ORDER — INSULIN GLARGINE 100 [IU]/ML
10 INJECTION, SOLUTION SUBCUTANEOUS
COMMUNITY
End: 2020-01-01

## 2020-01-01 RX ORDER — HYDROCODONE BITARTRATE AND ACETAMINOPHEN 5; 325 MG/1; MG/1
1 TABLET ORAL
Status: DISCONTINUED | OUTPATIENT
Start: 2020-01-01 | End: 2020-01-01 | Stop reason: HOSPADM

## 2020-01-01 RX ORDER — LEVOTHYROXINE SODIUM 20 UG/ML
66 INJECTION, SOLUTION INTRAVENOUS EVERY 24 HOURS
Status: DISCONTINUED | OUTPATIENT
Start: 2020-08-28 | End: 2020-01-01

## 2020-01-01 RX ORDER — FACIAL-BODY WIPES
10 EACH TOPICAL DAILY PRN
Status: DISCONTINUED | OUTPATIENT
Start: 2020-01-01 | End: 2020-01-01 | Stop reason: HOSPADM

## 2020-01-01 RX ORDER — BISACODYL 5 MG
5 TABLET, DELAYED RELEASE (ENTERIC COATED) ORAL DAILY PRN
Status: DISCONTINUED | OUTPATIENT
Start: 2020-01-01 | End: 2020-01-01 | Stop reason: HOSPADM

## 2020-01-01 RX ORDER — MORPHINE SULFATE 2 MG/ML
4 INJECTION, SOLUTION INTRAMUSCULAR; INTRAVENOUS
Status: DISCONTINUED | OUTPATIENT
Start: 2020-01-01 | End: 2020-01-01

## 2020-01-01 RX ORDER — FUROSEMIDE 10 MG/ML
80 INJECTION INTRAMUSCULAR; INTRAVENOUS ONCE
Status: COMPLETED | OUTPATIENT
Start: 2020-01-01 | End: 2020-01-01

## 2020-01-01 RX ORDER — MIDODRINE HYDROCHLORIDE 5 MG/1
10 TABLET ORAL ONCE
Status: COMPLETED | OUTPATIENT
Start: 2020-01-01 | End: 2020-01-01

## 2020-01-01 RX ORDER — CLOPIDOGREL BISULFATE 75 MG/1
TABLET ORAL
Qty: 30 TAB | Refills: 5 | Status: SHIPPED | OUTPATIENT
Start: 2020-01-01

## 2020-01-01 RX ORDER — FUROSEMIDE 40 MG/1
40 TABLET ORAL 2 TIMES DAILY
Qty: 60 TAB | Refills: 0 | Status: SHIPPED | OUTPATIENT
Start: 2020-01-01

## 2020-01-01 RX ORDER — CLOPIDOGREL BISULFATE 75 MG/1
75 TABLET ORAL DAILY
COMMUNITY
End: 2020-01-01

## 2020-01-01 RX ORDER — FUROSEMIDE 10 MG/ML
60 INJECTION INTRAMUSCULAR; INTRAVENOUS ONCE
Status: COMPLETED | OUTPATIENT
Start: 2020-01-01 | End: 2020-01-01

## 2020-01-01 RX ORDER — ACETAMINOPHEN 650 MG/1
650 SUPPOSITORY RECTAL
Status: DISCONTINUED | OUTPATIENT
Start: 2020-01-01 | End: 2020-01-01 | Stop reason: HOSPADM

## 2020-01-01 RX ORDER — SODIUM CHLORIDE 0.9 % (FLUSH) 0.9 %
5-40 SYRINGE (ML) INJECTION EVERY 8 HOURS
Status: DISCONTINUED | OUTPATIENT
Start: 2020-01-01 | End: 2020-01-01

## 2020-01-01 RX ORDER — INSULIN ASPART 100 [IU]/ML
INJECTION, SOLUTION INTRAVENOUS; SUBCUTANEOUS
COMMUNITY

## 2020-01-01 RX ORDER — TERIPARATIDE 250 UG/ML
INJECTION, SOLUTION SUBCUTANEOUS
COMMUNITY
Start: 2018-10-03 | End: 2020-01-01

## 2020-01-01 RX ORDER — SODIUM CHLORIDE, SODIUM LACTATE, POTASSIUM CHLORIDE, CALCIUM CHLORIDE 600; 310; 30; 20 MG/100ML; MG/100ML; MG/100ML; MG/100ML
25 INJECTION, SOLUTION INTRAVENOUS CONTINUOUS
Status: DISCONTINUED | OUTPATIENT
Start: 2020-01-01 | End: 2020-01-01 | Stop reason: HOSPADM

## 2020-01-01 RX ORDER — CAPECITABINE 500 MG/1
500 TABLET, FILM COATED ORAL 2 TIMES DAILY
COMMUNITY
End: 2020-01-01

## 2020-01-01 RX ORDER — BUPIVACAINE HYDROCHLORIDE 5 MG/ML
50 INJECTION, SOLUTION EPIDURAL; INTRACAUDAL ONCE
Status: COMPLETED | OUTPATIENT
Start: 2020-01-01 | End: 2020-01-01

## 2020-01-01 RX ORDER — FUROSEMIDE 10 MG/ML
20 INJECTION INTRAMUSCULAR; INTRAVENOUS DAILY
Status: DISCONTINUED | OUTPATIENT
Start: 2020-01-01 | End: 2020-01-01 | Stop reason: HOSPADM

## 2020-01-01 RX ORDER — POLYETHYLENE GLYCOL 3350 17 G/17G
17 POWDER, FOR SOLUTION ORAL DAILY PRN
Status: DISCONTINUED | OUTPATIENT
Start: 2020-01-01 | End: 2020-01-01

## 2020-01-01 RX ORDER — INSULIN GLARGINE 100 [IU]/ML
12 INJECTION, SOLUTION SUBCUTANEOUS
Status: DISCONTINUED | OUTPATIENT
Start: 2020-01-01 | End: 2020-01-01 | Stop reason: HOSPADM

## 2020-01-01 RX ORDER — POTASSIUM CHLORIDE 750 MG/1
40 TABLET, FILM COATED, EXTENDED RELEASE ORAL
Status: COMPLETED | OUTPATIENT
Start: 2020-01-01 | End: 2020-01-01

## 2020-01-01 RX ORDER — FUROSEMIDE 40 MG/1
80 TABLET ORAL EVERY OTHER DAY
COMMUNITY
End: 2020-01-01

## 2020-01-01 RX ORDER — FUROSEMIDE 10 MG/ML
40 INJECTION INTRAMUSCULAR; INTRAVENOUS EVERY 12 HOURS
Status: DISCONTINUED | OUTPATIENT
Start: 2020-01-01 | End: 2020-01-01

## 2020-01-01 RX ORDER — DEXTROSE MONOHYDRATE 100 MG/ML
0-250 INJECTION, SOLUTION INTRAVENOUS AS NEEDED
Status: DISCONTINUED | OUTPATIENT
Start: 2020-01-01 | End: 2020-01-01

## 2020-01-01 RX ORDER — MIDAZOLAM HYDROCHLORIDE 1 MG/ML
INJECTION, SOLUTION INTRAMUSCULAR; INTRAVENOUS
Status: DISCONTINUED
Start: 2020-01-01 | End: 2020-01-01

## 2020-01-01 RX ADMIN — FUROSEMIDE 40 MG: 10 INJECTION, SOLUTION INTRAMUSCULAR; INTRAVENOUS at 08:49

## 2020-01-01 RX ADMIN — INSULIN LISPRO 2 UNITS: 100 INJECTION, SOLUTION INTRAVENOUS; SUBCUTANEOUS at 22:15

## 2020-01-01 RX ADMIN — CARVEDILOL 6.25 MG: 6.25 TABLET, FILM COATED ORAL at 17:11

## 2020-01-01 RX ADMIN — CEFTRIAXONE 1 G: 1 INJECTION, POWDER, FOR SOLUTION INTRAMUSCULAR; INTRAVENOUS at 12:41

## 2020-01-01 RX ADMIN — INSULIN LISPRO 2 UNITS: 100 INJECTION, SOLUTION INTRAVENOUS; SUBCUTANEOUS at 08:48

## 2020-01-01 RX ADMIN — CEFDINIR 300 MG: 300 CAPSULE ORAL at 17:07

## 2020-01-01 RX ADMIN — CARVEDILOL 6.25 MG: 6.25 TABLET, FILM COATED ORAL at 18:30

## 2020-01-01 RX ADMIN — CLOPIDOGREL BISULFATE 75 MG: 75 TABLET ORAL at 09:23

## 2020-01-01 RX ADMIN — ACETAMINOPHEN 650 MG: 325 TABLET, FILM COATED ORAL at 20:26

## 2020-01-01 RX ADMIN — INSULIN LISPRO 4 UNITS: 100 INJECTION, SOLUTION INTRAVENOUS; SUBCUTANEOUS at 21:34

## 2020-01-01 RX ADMIN — AMIODARONE HYDROCHLORIDE 1 MG/MIN: 50 INJECTION, SOLUTION INTRAVENOUS at 13:53

## 2020-01-01 RX ADMIN — CEFEPIME 2 G: 2 INJECTION, POWDER, FOR SOLUTION INTRAVENOUS at 11:41

## 2020-01-01 RX ADMIN — Medication 10 ML: at 06:44

## 2020-01-01 RX ADMIN — ASPIRIN 81 MG: 81 TABLET, COATED ORAL at 08:56

## 2020-01-01 RX ADMIN — CARVEDILOL 6.25 MG: 6.25 TABLET, FILM COATED ORAL at 08:49

## 2020-01-01 RX ADMIN — Medication 1 CAPSULE: at 10:26

## 2020-01-01 RX ADMIN — INSULIN LISPRO 3 UNITS: 100 INJECTION, SOLUTION INTRAVENOUS; SUBCUTANEOUS at 22:11

## 2020-01-01 RX ADMIN — Medication 10 ML: at 21:22

## 2020-01-01 RX ADMIN — LEVOFLOXACIN 750 MG: 5 INJECTION, SOLUTION INTRAVENOUS at 00:57

## 2020-01-01 RX ADMIN — SODIUM CHLORIDE 1000 ML: 9 INJECTION, SOLUTION INTRAVENOUS at 20:33

## 2020-01-01 RX ADMIN — LORAZEPAM 1 MG: 2 INJECTION INTRAMUSCULAR; INTRAVENOUS at 06:48

## 2020-01-01 RX ADMIN — INSULIN LISPRO 7 UNITS: 100 INJECTION, SOLUTION INTRAVENOUS; SUBCUTANEOUS at 08:05

## 2020-01-01 RX ADMIN — Medication 10 ML: at 06:30

## 2020-01-01 RX ADMIN — FUROSEMIDE 80 MG: 40 TABLET ORAL at 08:36

## 2020-01-01 RX ADMIN — INSULIN LISPRO 2 UNITS: 100 INJECTION, SOLUTION INTRAVENOUS; SUBCUTANEOUS at 16:32

## 2020-01-01 RX ADMIN — FAMOTIDINE 20 MG: 20 TABLET ORAL at 08:18

## 2020-01-01 RX ADMIN — ACETAMINOPHEN 650 MG: 325 TABLET, FILM COATED ORAL at 14:42

## 2020-01-01 RX ADMIN — CARVEDILOL 6.25 MG: 6.25 TABLET, FILM COATED ORAL at 17:01

## 2020-01-01 RX ADMIN — SENNOSIDES AND DOCUSATE SODIUM 1 TABLET: 8.6; 5 TABLET ORAL at 11:50

## 2020-01-01 RX ADMIN — INSULIN LISPRO 2 UNITS: 100 INJECTION, SOLUTION INTRAVENOUS; SUBCUTANEOUS at 09:15

## 2020-01-01 RX ADMIN — SODIUM CHLORIDE 500 ML: 900 INJECTION, SOLUTION INTRAVENOUS at 00:57

## 2020-01-01 RX ADMIN — CEFEPIME 2 G: 2 INJECTION, POWDER, FOR SOLUTION INTRAVENOUS at 10:54

## 2020-01-01 RX ADMIN — ACETAMINOPHEN 650 MG: 325 TABLET ORAL at 00:45

## 2020-01-01 RX ADMIN — MILRINONE LACTATE 0.2 MCG/KG/MIN: 200 INJECTION, SOLUTION INTRAVENOUS at 11:59

## 2020-01-01 RX ADMIN — LEVOTHYROXINE SODIUM 88 MCG: 0.09 TABLET ORAL at 07:16

## 2020-01-01 RX ADMIN — CLOPIDOGREL BISULFATE 75 MG: 75 TABLET ORAL at 08:14

## 2020-01-01 RX ADMIN — MIDODRINE HYDROCHLORIDE 10 MG: 5 TABLET ORAL at 08:18

## 2020-01-01 RX ADMIN — FUROSEMIDE 40 MG: 40 TABLET ORAL at 08:58

## 2020-01-01 RX ADMIN — INSULIN LISPRO 5 UNITS: 100 INJECTION, SOLUTION INTRAVENOUS; SUBCUTANEOUS at 17:16

## 2020-01-01 RX ADMIN — HEPARIN SODIUM 3550 UNITS: 5000 INJECTION, SOLUTION INTRAVENOUS; SUBCUTANEOUS at 20:29

## 2020-01-01 RX ADMIN — DEXTROSE MONOHYDRATE 16 MCG/MIN: 5 INJECTION, SOLUTION INTRAVENOUS at 18:26

## 2020-01-01 RX ADMIN — ACETAMINOPHEN 650 MG: 325 TABLET ORAL at 22:07

## 2020-01-01 RX ADMIN — DIPHENHYDRAMINE HYDROCHLORIDE AND LIDOCAINE HYDROCHLORIDE AND ALUMINUM HYDROXIDE AND MAGNESIUM HYDRO 5 ML: KIT at 17:01

## 2020-01-01 RX ADMIN — CEFEPIME 2 G: 2 INJECTION, POWDER, FOR SOLUTION INTRAVENOUS at 12:26

## 2020-01-01 RX ADMIN — SODIUM CHLORIDE 10 MG: 9 INJECTION INTRAMUSCULAR; INTRAVENOUS; SUBCUTANEOUS at 23:43

## 2020-01-01 RX ADMIN — CEFTRIAXONE 1 G: 1 INJECTION, POWDER, FOR SOLUTION INTRAMUSCULAR; INTRAVENOUS at 11:57

## 2020-01-01 RX ADMIN — MIDODRINE HYDROCHLORIDE 15 MG: 5 TABLET ORAL at 07:59

## 2020-01-01 RX ADMIN — INSULIN LISPRO 3 UNITS: 100 INJECTION, SOLUTION INTRAVENOUS; SUBCUTANEOUS at 11:57

## 2020-01-01 RX ADMIN — ASPIRIN 81 MG: 81 TABLET, COATED ORAL at 09:26

## 2020-01-01 RX ADMIN — PROPOFOL 20 MG: 10 INJECTION, EMULSION INTRAVENOUS at 09:56

## 2020-01-01 RX ADMIN — ASPIRIN 81 MG: 81 TABLET, COATED ORAL at 08:41

## 2020-01-01 RX ADMIN — INSULIN LISPRO 3 UNITS: 100 INJECTION, SOLUTION INTRAVENOUS; SUBCUTANEOUS at 11:32

## 2020-01-01 RX ADMIN — Medication 10 ML: at 06:21

## 2020-01-01 RX ADMIN — INSULIN GLARGINE 14 UNITS: 100 INJECTION, SOLUTION SUBCUTANEOUS at 22:50

## 2020-01-01 RX ADMIN — NOREPINEPHRINE BITARTRATE 1 MCG/MIN: 1 INJECTION, SOLUTION, CONCENTRATE INTRAVENOUS at 08:01

## 2020-01-01 RX ADMIN — INSULIN LISPRO 4 UNITS: 100 INJECTION, SOLUTION INTRAVENOUS; SUBCUTANEOUS at 21:13

## 2020-01-01 RX ADMIN — INSULIN LISPRO 3 UNITS: 100 INJECTION, SOLUTION INTRAVENOUS; SUBCUTANEOUS at 16:50

## 2020-01-01 RX ADMIN — INSULIN LISPRO 3 UNITS: 100 INJECTION, SOLUTION INTRAVENOUS; SUBCUTANEOUS at 22:14

## 2020-01-01 RX ADMIN — ASPIRIN 81 MG: 81 TABLET, COATED ORAL at 08:49

## 2020-01-01 RX ADMIN — SENNOSIDES 17.2 MG: 8.6 TABLET, FILM COATED ORAL at 08:27

## 2020-01-01 RX ADMIN — CLOPIDOGREL BISULFATE 75 MG: 75 TABLET, FILM COATED ORAL at 10:26

## 2020-01-01 RX ADMIN — ACETAMINOPHEN 650 MG: 325 TABLET ORAL at 04:09

## 2020-01-01 RX ADMIN — INSULIN LISPRO 2 UNITS: 100 INJECTION, SOLUTION INTRAVENOUS; SUBCUTANEOUS at 17:35

## 2020-01-01 RX ADMIN — CLOPIDOGREL BISULFATE 75 MG: 75 TABLET ORAL at 09:26

## 2020-01-01 RX ADMIN — INSULIN LISPRO 7 UNITS: 100 INJECTION, SOLUTION INTRAVENOUS; SUBCUTANEOUS at 09:02

## 2020-01-01 RX ADMIN — SENNOSIDES AND DOCUSATE SODIUM 1 TABLET: 8.6; 5 TABLET ORAL at 10:17

## 2020-01-01 RX ADMIN — INSULIN LISPRO 2 UNITS: 100 INJECTION, SOLUTION INTRAVENOUS; SUBCUTANEOUS at 22:50

## 2020-01-01 RX ADMIN — FUROSEMIDE 80 MG: 40 TABLET ORAL at 10:26

## 2020-01-01 RX ADMIN — CARVEDILOL 6.25 MG: 6.25 TABLET, FILM COATED ORAL at 07:03

## 2020-01-01 RX ADMIN — INSULIN LISPRO 2 UNITS: 100 INJECTION, SOLUTION INTRAVENOUS; SUBCUTANEOUS at 07:22

## 2020-01-01 RX ADMIN — INSULIN GLARGINE 14 UNITS: 100 INJECTION, SOLUTION SUBCUTANEOUS at 22:31

## 2020-01-01 RX ADMIN — CARVEDILOL 6.25 MG: 6.25 TABLET, FILM COATED ORAL at 08:57

## 2020-01-01 RX ADMIN — LEVOTHYROXINE SODIUM 88 MCG: 88 TABLET ORAL at 06:47

## 2020-01-01 RX ADMIN — NOREPINEPHRINE BITARTRATE 4 MCG/MIN: 1 INJECTION, SOLUTION, CONCENTRATE INTRAVENOUS at 22:47

## 2020-01-01 RX ADMIN — QUETIAPINE FUMARATE 50 MG: 25 TABLET ORAL at 01:23

## 2020-01-01 RX ADMIN — AMIODARONE HYDROCHLORIDE 1 MG/MIN: 50 INJECTION, SOLUTION INTRAVENOUS at 21:43

## 2020-01-01 RX ADMIN — INSULIN LISPRO 3 UNITS: 100 INJECTION, SOLUTION INTRAVENOUS; SUBCUTANEOUS at 08:06

## 2020-01-01 RX ADMIN — INSULIN LISPRO 2 UNITS: 100 INJECTION, SOLUTION INTRAVENOUS; SUBCUTANEOUS at 22:30

## 2020-01-01 RX ADMIN — INSULIN LISPRO 2 UNITS: 100 INJECTION, SOLUTION INTRAVENOUS; SUBCUTANEOUS at 22:26

## 2020-01-01 RX ADMIN — DEXTROSE MONOHYDRATE 6 MCG/MIN: 5 INJECTION, SOLUTION INTRAVENOUS at 12:20

## 2020-01-01 RX ADMIN — Medication 10 ML: at 21:03

## 2020-01-01 RX ADMIN — FUROSEMIDE 40 MG: 10 INJECTION, SOLUTION INTRAMUSCULAR; INTRAVENOUS at 07:07

## 2020-01-01 RX ADMIN — FUROSEMIDE 40 MG: 10 INJECTION, SOLUTION INTRAMUSCULAR; INTRAVENOUS at 12:50

## 2020-01-01 RX ADMIN — SODIUM CHLORIDE 750 MG: 900 INJECTION, SOLUTION INTRAVENOUS at 23:49

## 2020-01-01 RX ADMIN — INSULIN GLARGINE 8 UNITS: 100 INJECTION, SOLUTION SUBCUTANEOUS at 08:55

## 2020-01-01 RX ADMIN — MORPHINE SULFATE 2 MG: 2 INJECTION, SOLUTION INTRAMUSCULAR; INTRAVENOUS at 03:57

## 2020-01-01 RX ADMIN — INSULIN LISPRO 3 UNITS: 100 INJECTION, SOLUTION INTRAVENOUS; SUBCUTANEOUS at 08:14

## 2020-01-01 RX ADMIN — ACETAMINOPHEN 650 MG: 325 TABLET, FILM COATED ORAL at 20:22

## 2020-01-01 RX ADMIN — CARVEDILOL 6.25 MG: 6.25 TABLET, FILM COATED ORAL at 18:00

## 2020-01-01 RX ADMIN — CALCIUM CARBONATE (ANTACID) CHEW TAB 500 MG 200 MG: 500 CHEW TAB at 23:56

## 2020-01-01 RX ADMIN — ACETAMINOPHEN 650 MG: 325 TABLET ORAL at 22:16

## 2020-01-01 RX ADMIN — FAMOTIDINE 20 MG: 20 TABLET ORAL at 11:50

## 2020-01-01 RX ADMIN — Medication 10 ML: at 06:16

## 2020-01-01 RX ADMIN — ACETAMINOPHEN 650 MG: 325 TABLET ORAL at 14:26

## 2020-01-01 RX ADMIN — AMIODARONE HYDROCHLORIDE 1 MG/MIN: 50 INJECTION, SOLUTION INTRAVENOUS at 01:29

## 2020-01-01 RX ADMIN — POLYETHYLENE GLYCOL 3350 17 G: 17 POWDER, FOR SOLUTION ORAL at 11:36

## 2020-01-01 RX ADMIN — ASPIRIN 81 MG: 81 TABLET, COATED ORAL at 08:57

## 2020-01-01 RX ADMIN — INSULIN LISPRO 2 UNITS: 100 INJECTION, SOLUTION INTRAVENOUS; SUBCUTANEOUS at 09:30

## 2020-01-01 RX ADMIN — LEVOTHYROXINE SODIUM 88 MCG: 88 TABLET ORAL at 07:10

## 2020-01-01 RX ADMIN — VITAMIN D 1 TABLET: 25 TAB ORAL at 08:57

## 2020-01-01 RX ADMIN — Medication 10 ML: at 11:35

## 2020-01-01 RX ADMIN — Medication 10 ML: at 13:07

## 2020-01-01 RX ADMIN — INSULIN LISPRO 3 UNITS: 100 INJECTION, SOLUTION INTRAVENOUS; SUBCUTANEOUS at 11:52

## 2020-01-01 RX ADMIN — CEFTRIAXONE 1 G: 1 INJECTION, POWDER, FOR SOLUTION INTRAMUSCULAR; INTRAVENOUS at 13:07

## 2020-01-01 RX ADMIN — CLOPIDOGREL BISULFATE 75 MG: 75 TABLET ORAL at 08:58

## 2020-01-01 RX ADMIN — CEFEPIME 2 G: 2 INJECTION, POWDER, FOR SOLUTION INTRAVENOUS at 12:27

## 2020-01-01 RX ADMIN — BISACODYL 10 MG: 10 SUPPOSITORY RECTAL at 18:46

## 2020-01-01 RX ADMIN — INSULIN LISPRO 7 UNITS: 100 INJECTION, SOLUTION INTRAVENOUS; SUBCUTANEOUS at 16:54

## 2020-01-01 RX ADMIN — FUROSEMIDE 40 MG: 40 TABLET ORAL at 08:07

## 2020-01-01 RX ADMIN — LEVOTHYROXINE SODIUM 88 MCG: 88 TABLET ORAL at 07:40

## 2020-01-01 RX ADMIN — INSULIN LISPRO 5 UNITS: 100 INJECTION, SOLUTION INTRAVENOUS; SUBCUTANEOUS at 12:15

## 2020-01-01 RX ADMIN — LEVOTHYROXINE SODIUM 88 MCG: 88 TABLET ORAL at 07:15

## 2020-01-01 RX ADMIN — AMIODARONE HYDROCHLORIDE 1 MG/MIN: 50 INJECTION, SOLUTION INTRAVENOUS at 12:20

## 2020-01-01 RX ADMIN — CEFTRIAXONE 1 G: 1 INJECTION, POWDER, FOR SOLUTION INTRAMUSCULAR; INTRAVENOUS at 17:15

## 2020-01-01 RX ADMIN — FUROSEMIDE 80 MG: 10 INJECTION, SOLUTION INTRAMUSCULAR; INTRAVENOUS at 16:37

## 2020-01-01 RX ADMIN — CARVEDILOL 6.25 MG: 6.25 TABLET, FILM COATED ORAL at 17:37

## 2020-01-01 RX ADMIN — INSULIN LISPRO 5 UNITS: 100 INJECTION, SOLUTION INTRAVENOUS; SUBCUTANEOUS at 11:33

## 2020-01-01 RX ADMIN — Medication 20 ML: at 16:00

## 2020-01-01 RX ADMIN — THERA TABS 1 TABLET: TAB at 08:49

## 2020-01-01 RX ADMIN — INSULIN LISPRO 15 UNITS: 100 INJECTION, SOLUTION INTRAVENOUS; SUBCUTANEOUS at 12:54

## 2020-01-01 RX ADMIN — Medication 10 ML: at 13:03

## 2020-01-01 RX ADMIN — ACETAMINOPHEN 650 MG: 325 TABLET ORAL at 14:31

## 2020-01-01 RX ADMIN — LEVOTHYROXINE SODIUM 88 MCG: 0.09 TABLET ORAL at 06:45

## 2020-01-01 RX ADMIN — INSULIN LISPRO 8 UNITS: 100 INJECTION, SOLUTION INTRAVENOUS; SUBCUTANEOUS at 22:46

## 2020-01-01 RX ADMIN — CLOPIDOGREL BISULFATE 75 MG: 75 TABLET ORAL at 08:18

## 2020-01-01 RX ADMIN — INSULIN LISPRO 7 UNITS: 100 INJECTION, SOLUTION INTRAVENOUS; SUBCUTANEOUS at 08:55

## 2020-01-01 RX ADMIN — FUROSEMIDE 40 MG: 10 INJECTION, SOLUTION INTRAMUSCULAR; INTRAVENOUS at 14:27

## 2020-01-01 RX ADMIN — Medication 10 ML: at 21:27

## 2020-01-01 RX ADMIN — INSULIN LISPRO 2 UNITS: 100 INJECTION, SOLUTION INTRAVENOUS; SUBCUTANEOUS at 16:57

## 2020-01-01 RX ADMIN — CLOPIDOGREL BISULFATE 75 MG: 75 TABLET ORAL at 08:55

## 2020-01-01 RX ADMIN — FUROSEMIDE 80 MG: 40 TABLET ORAL at 08:44

## 2020-01-01 RX ADMIN — Medication 10 ML: at 05:36

## 2020-01-01 RX ADMIN — ASPIRIN 81 MG: 81 TABLET, COATED ORAL at 08:14

## 2020-01-01 RX ADMIN — INSULIN LISPRO 2 UNITS: 100 INJECTION, SOLUTION INTRAVENOUS; SUBCUTANEOUS at 07:58

## 2020-01-01 RX ADMIN — ALBUMIN (HUMAN) 12.5 G: 0.25 INJECTION, SOLUTION INTRAVENOUS at 05:47

## 2020-01-01 RX ADMIN — SODIUM CHLORIDE 75 ML/HR: 900 INJECTION, SOLUTION INTRAVENOUS at 08:00

## 2020-01-01 RX ADMIN — SODIUM CHLORIDE 1000 ML: 900 INJECTION, SOLUTION INTRAVENOUS at 11:39

## 2020-01-01 RX ADMIN — DEXTROSE MONOHYDRATE 4 MCG/MIN: 5 INJECTION, SOLUTION INTRAVENOUS at 04:18

## 2020-01-01 RX ADMIN — ONDANSETRON 4 MG: 2 INJECTION INTRAMUSCULAR; INTRAVENOUS at 21:18

## 2020-01-01 RX ADMIN — ACETAMINOPHEN 650 MG: 325 TABLET, FILM COATED ORAL at 20:24

## 2020-01-01 RX ADMIN — FUROSEMIDE 80 MG: 40 TABLET ORAL at 08:52

## 2020-01-01 RX ADMIN — FUROSEMIDE 20 MG: 10 INJECTION, SOLUTION INTRAMUSCULAR; INTRAVENOUS at 09:26

## 2020-01-01 RX ADMIN — INSULIN LISPRO 2 UNITS: 100 INJECTION, SOLUTION INTRAVENOUS; SUBCUTANEOUS at 12:42

## 2020-01-01 RX ADMIN — Medication 10 ML: at 22:15

## 2020-01-01 RX ADMIN — Medication 10 ML: at 18:48

## 2020-01-01 RX ADMIN — CLOPIDOGREL BISULFATE 75 MG: 75 TABLET ORAL at 08:30

## 2020-01-01 RX ADMIN — INSULIN GLARGINE 14 UNITS: 100 INJECTION, SOLUTION SUBCUTANEOUS at 21:14

## 2020-01-01 RX ADMIN — Medication 10 ML: at 07:40

## 2020-01-01 RX ADMIN — CARVEDILOL 6.25 MG: 6.25 TABLET, FILM COATED ORAL at 08:35

## 2020-01-01 RX ADMIN — CLOPIDOGREL BISULFATE 75 MG: 75 TABLET, FILM COATED ORAL at 08:44

## 2020-01-01 RX ADMIN — ACETAMINOPHEN 1000 MG: 500 TABLET, FILM COATED ORAL at 10:39

## 2020-01-01 RX ADMIN — DIPHENHYDRAMINE HYDROCHLORIDE AND LIDOCAINE HYDROCHLORIDE AND ALUMINUM HYDROXIDE AND MAGNESIUM HYDRO 5 ML: KIT at 07:40

## 2020-01-01 RX ADMIN — CARVEDILOL 6.25 MG: 6.25 TABLET, FILM COATED ORAL at 07:20

## 2020-01-01 RX ADMIN — ASPIRIN 81 MG: 81 TABLET, COATED ORAL at 08:07

## 2020-01-01 RX ADMIN — SODIUM CHLORIDE, SODIUM LACTATE, POTASSIUM CHLORIDE, AND CALCIUM CHLORIDE 25 ML/HR: 600; 310; 30; 20 INJECTION, SOLUTION INTRAVENOUS at 08:40

## 2020-01-01 RX ADMIN — INSULIN LISPRO 4 UNITS: 100 INJECTION, SOLUTION INTRAVENOUS; SUBCUTANEOUS at 22:06

## 2020-01-01 RX ADMIN — MAGNESIUM SULFATE IN WATER 2 G: 40 INJECTION, SOLUTION INTRAVENOUS at 08:14

## 2020-01-01 RX ADMIN — AMIODARONE HYDROCHLORIDE 0.5 MG/MIN: 50 INJECTION, SOLUTION INTRAVENOUS at 04:18

## 2020-01-01 RX ADMIN — MIDODRINE HYDROCHLORIDE 10 MG: 5 TABLET ORAL at 08:41

## 2020-01-01 RX ADMIN — Medication 4 MCG/MIN: at 22:00

## 2020-01-01 RX ADMIN — ASPIRIN 81 MG: 81 TABLET, COATED ORAL at 08:35

## 2020-01-01 RX ADMIN — DIPHENHYDRAMINE HYDROCHLORIDE AND LIDOCAINE HYDROCHLORIDE AND ALUMINUM HYDROXIDE AND MAGNESIUM HYDRO 5 ML: KIT at 11:43

## 2020-01-01 RX ADMIN — CLOPIDOGREL BISULFATE 75 MG: 75 TABLET ORAL at 08:00

## 2020-01-01 RX ADMIN — ALBUMIN (HUMAN) 25 G: 0.25 INJECTION, SOLUTION INTRAVENOUS at 19:37

## 2020-01-01 RX ADMIN — Medication 10 ML: at 15:49

## 2020-01-01 RX ADMIN — INSULIN LISPRO 3 UNITS: 100 INJECTION, SOLUTION INTRAVENOUS; SUBCUTANEOUS at 12:48

## 2020-01-01 RX ADMIN — ACETAMINOPHEN 650 MG: 325 TABLET ORAL at 10:25

## 2020-01-01 RX ADMIN — CLOPIDOGREL BISULFATE 75 MG: 75 TABLET ORAL at 08:07

## 2020-01-01 RX ADMIN — INSULIN LISPRO 15 UNITS: 100 INJECTION, SOLUTION INTRAVENOUS; SUBCUTANEOUS at 12:43

## 2020-01-01 RX ADMIN — MIDODRINE HYDROCHLORIDE 10 MG: 5 TABLET ORAL at 09:21

## 2020-01-01 RX ADMIN — INSULIN GLARGINE 5 UNITS: 100 INJECTION, SOLUTION SUBCUTANEOUS at 12:43

## 2020-01-01 RX ADMIN — POLYETHYLENE GLYCOL 3350 17 G: 17 POWDER, FOR SOLUTION ORAL at 08:36

## 2020-01-01 RX ADMIN — SODIUM CHLORIDE 50 ML/HR: 900 INJECTION, SOLUTION INTRAVENOUS at 15:25

## 2020-01-01 RX ADMIN — Medication 10 ML: at 05:03

## 2020-01-01 RX ADMIN — LISINOPRIL 5 MG: 5 TABLET ORAL at 08:58

## 2020-01-01 RX ADMIN — ASPIRIN 81 MG: 81 TABLET, COATED ORAL at 08:29

## 2020-01-01 RX ADMIN — INSULIN LISPRO 3 UNITS: 100 INJECTION, SOLUTION INTRAVENOUS; SUBCUTANEOUS at 22:46

## 2020-01-01 RX ADMIN — EPOETIN ALFA-EPBX 10000 UNITS: 10000 INJECTION, SOLUTION INTRAVENOUS; SUBCUTANEOUS at 21:41

## 2020-01-01 RX ADMIN — MAGNESIUM HYDROXIDE 30 ML: 400 SUSPENSION ORAL at 16:23

## 2020-01-01 RX ADMIN — CLOPIDOGREL BISULFATE 75 MG: 75 TABLET ORAL at 08:35

## 2020-01-01 RX ADMIN — ERTAPENEM SODIUM 1 G: 1 INJECTION, POWDER, LYOPHILIZED, FOR SOLUTION INTRAMUSCULAR; INTRAVENOUS at 10:49

## 2020-01-01 RX ADMIN — POLYETHYLENE GLYCOL 3350 17 G: 17 POWDER, FOR SOLUTION ORAL at 08:49

## 2020-01-01 RX ADMIN — CEFEPIME 2 G: 2 INJECTION, POWDER, FOR SOLUTION INTRAVENOUS at 11:49

## 2020-01-01 RX ADMIN — INSULIN LISPRO 2 UNITS: 100 INJECTION, SOLUTION INTRAVENOUS; SUBCUTANEOUS at 21:26

## 2020-01-01 RX ADMIN — LEVOTHYROXINE SODIUM 88 MCG: 88 TABLET ORAL at 06:00

## 2020-01-01 RX ADMIN — CEFEPIME 2 G: 2 INJECTION, POWDER, FOR SOLUTION INTRAVENOUS at 12:43

## 2020-01-01 RX ADMIN — MORPHINE SULFATE 2 MG: 2 INJECTION, SOLUTION INTRAMUSCULAR; INTRAVENOUS at 22:26

## 2020-01-01 RX ADMIN — INSULIN LISPRO 3 UNITS: 100 INJECTION, SOLUTION INTRAVENOUS; SUBCUTANEOUS at 11:59

## 2020-01-01 RX ADMIN — LEVOTHYROXINE SODIUM 88 MCG: 88 TABLET ORAL at 08:02

## 2020-01-01 RX ADMIN — DIPHENHYDRAMINE HYDROCHLORIDE AND LIDOCAINE HYDROCHLORIDE AND ALUMINUM HYDROXIDE AND MAGNESIUM HYDRO 5 ML: KIT at 11:57

## 2020-01-01 RX ADMIN — LEVOTHYROXINE SODIUM 88 MCG: 88 TABLET ORAL at 06:43

## 2020-01-01 RX ADMIN — DIPHENHYDRAMINE HYDROCHLORIDE AND LIDOCAINE HYDROCHLORIDE AND ALUMINUM HYDROXIDE AND MAGNESIUM HYDRO 5 ML: KIT at 11:30

## 2020-01-01 RX ADMIN — FUROSEMIDE 80 MG: 40 TABLET ORAL at 09:36

## 2020-01-01 RX ADMIN — LEVOTHYROXINE SODIUM 88 MCG: 88 TABLET ORAL at 09:49

## 2020-01-01 RX ADMIN — ASPIRIN 81 MG: 81 TABLET, COATED ORAL at 08:55

## 2020-01-01 RX ADMIN — Medication 10 ML: at 21:47

## 2020-01-01 RX ADMIN — CEFTRIAXONE 1 G: 1 INJECTION, POWDER, FOR SOLUTION INTRAMUSCULAR; INTRAVENOUS at 11:34

## 2020-01-01 RX ADMIN — Medication 10 ML: at 12:27

## 2020-01-01 RX ADMIN — MORPHINE SULFATE 2 MG: 2 INJECTION, SOLUTION INTRAMUSCULAR; INTRAVENOUS at 09:00

## 2020-01-01 RX ADMIN — ASPIRIN 81 MG 81 MG: 81 TABLET ORAL at 08:53

## 2020-01-01 RX ADMIN — INSULIN GLARGINE 8 UNITS: 100 INJECTION, SOLUTION SUBCUTANEOUS at 21:46

## 2020-01-01 RX ADMIN — MORPHINE SULFATE 2 MG: 2 INJECTION, SOLUTION INTRAMUSCULAR; INTRAVENOUS at 15:26

## 2020-01-01 RX ADMIN — POTASSIUM CHLORIDE 40 MEQ: 750 TABLET, FILM COATED, EXTENDED RELEASE ORAL at 12:16

## 2020-01-01 RX ADMIN — ACETAMINOPHEN 650 MG: 325 TABLET ORAL at 08:55

## 2020-01-01 RX ADMIN — VITAMIN D 1 TABLET: 25 TAB ORAL at 08:07

## 2020-01-01 RX ADMIN — PHENYLEPHRINE HYDROCHLORIDE 75 MCG/MIN: 10 INJECTION INTRAVENOUS at 20:35

## 2020-01-01 RX ADMIN — Medication 10 ML: at 21:26

## 2020-01-01 RX ADMIN — MORPHINE SULFATE 2 MG: 2 INJECTION, SOLUTION INTRAMUSCULAR; INTRAVENOUS at 06:03

## 2020-01-01 RX ADMIN — PROPOFOL 30 MG: 10 INJECTION, EMULSION INTRAVENOUS at 09:42

## 2020-01-01 RX ADMIN — Medication 10 ML: at 07:46

## 2020-01-01 RX ADMIN — CARVEDILOL 6.25 MG: 6.25 TABLET, FILM COATED ORAL at 16:50

## 2020-01-01 RX ADMIN — Medication 10 ML: at 13:02

## 2020-01-01 RX ADMIN — CARVEDILOL 6.25 MG: 6.25 TABLET, FILM COATED ORAL at 17:28

## 2020-01-01 RX ADMIN — SENNOSIDES 17.2 MG: 8.6 TABLET, FILM COATED ORAL at 09:37

## 2020-01-01 RX ADMIN — INSULIN GLARGINE 13 UNITS: 100 INJECTION, SOLUTION SUBCUTANEOUS at 08:41

## 2020-01-01 RX ADMIN — SODIUM CHLORIDE 1000 ML: 900 INJECTION, SOLUTION INTRAVENOUS at 12:36

## 2020-01-01 RX ADMIN — THERA TABS 1 TABLET: TAB at 08:07

## 2020-01-01 RX ADMIN — MELATONIN 3 MG: at 12:04

## 2020-01-01 RX ADMIN — ACETAMINOPHEN 650 MG: 325 TABLET, FILM COATED ORAL at 11:35

## 2020-01-01 RX ADMIN — INSULIN GLARGINE 8 UNITS: 100 INJECTION, SOLUTION SUBCUTANEOUS at 23:42

## 2020-01-01 RX ADMIN — LEVOTHYROXINE SODIUM 88 MCG: 0.09 TABLET ORAL at 06:59

## 2020-01-01 RX ADMIN — CLOPIDOGREL BISULFATE 75 MG: 75 TABLET ORAL at 08:49

## 2020-01-01 RX ADMIN — AMIODARONE HYDROCHLORIDE 1 MG/MIN: 50 INJECTION, SOLUTION INTRAVENOUS at 05:30

## 2020-01-01 RX ADMIN — Medication 40 ML: at 14:00

## 2020-01-01 RX ADMIN — LEVOTHYROXINE SODIUM 88 MCG: 88 TABLET ORAL at 07:38

## 2020-01-01 RX ADMIN — Medication 10 ML: at 07:26

## 2020-01-01 RX ADMIN — FUROSEMIDE 40 MG: 10 INJECTION, SOLUTION INTRAMUSCULAR; INTRAVENOUS at 11:51

## 2020-01-01 RX ADMIN — INSULIN LISPRO 2 UNITS: 100 INJECTION, SOLUTION INTRAVENOUS; SUBCUTANEOUS at 17:14

## 2020-01-01 RX ADMIN — CEFEPIME 2 G: 2 INJECTION, POWDER, FOR SOLUTION INTRAVENOUS at 11:32

## 2020-01-01 RX ADMIN — INSULIN LISPRO 2 UNITS: 100 INJECTION, SOLUTION INTRAVENOUS; SUBCUTANEOUS at 18:31

## 2020-01-01 RX ADMIN — FUROSEMIDE 40 MG: 10 INJECTION, SOLUTION INTRAMUSCULAR; INTRAVENOUS at 21:22

## 2020-01-01 RX ADMIN — Medication 10 ML: at 22:31

## 2020-01-01 RX ADMIN — SENNOSIDES AND DOCUSATE SODIUM 1 TABLET: 8.6; 5 TABLET ORAL at 08:49

## 2020-01-01 RX ADMIN — ONDANSETRON 4 MG: 2 INJECTION, SOLUTION INTRAMUSCULAR; INTRAVENOUS at 19:17

## 2020-01-01 RX ADMIN — MIDAZOLAM 0.5 MG: 1 INJECTION INTRAMUSCULAR; INTRAVENOUS at 09:26

## 2020-01-01 RX ADMIN — SODIUM CHLORIDE 75 ML/HR: 9 INJECTION, SOLUTION INTRAVENOUS at 21:40

## 2020-01-01 RX ADMIN — FUROSEMIDE 40 MG: 10 INJECTION, SOLUTION INTRAMUSCULAR; INTRAVENOUS at 08:36

## 2020-01-01 RX ADMIN — MAGNESIUM HYDROXIDE 30 ML: 400 SUSPENSION ORAL at 11:18

## 2020-01-01 RX ADMIN — Medication 10 ML: at 23:52

## 2020-01-01 RX ADMIN — INSULIN GLARGINE 14 UNITS: 100 INJECTION, SOLUTION SUBCUTANEOUS at 21:16

## 2020-01-01 RX ADMIN — INSULIN LISPRO 2 UNITS: 100 INJECTION, SOLUTION INTRAVENOUS; SUBCUTANEOUS at 12:54

## 2020-01-01 RX ADMIN — MIDODRINE HYDROCHLORIDE 10 MG: 5 TABLET ORAL at 12:28

## 2020-01-01 RX ADMIN — MORPHINE SULFATE 2 MG: 2 INJECTION, SOLUTION INTRAMUSCULAR; INTRAVENOUS at 16:02

## 2020-01-01 RX ADMIN — THERA TABS 1 TABLET: TAB at 08:35

## 2020-01-01 RX ADMIN — THERA TABS 1 TABLET: TAB at 08:58

## 2020-01-01 RX ADMIN — SENNOSIDES 17.2 MG: 8.6 TABLET, FILM COATED ORAL at 08:53

## 2020-01-01 RX ADMIN — INSULIN LISPRO 7 UNITS: 100 INJECTION, SOLUTION INTRAVENOUS; SUBCUTANEOUS at 16:55

## 2020-01-01 RX ADMIN — POLYETHYLENE GLYCOL 3350 17 G: 17 POWDER, FOR SOLUTION ORAL at 08:27

## 2020-01-01 RX ADMIN — INSULIN LISPRO 2 UNITS: 100 INJECTION, SOLUTION INTRAVENOUS; SUBCUTANEOUS at 07:39

## 2020-01-01 RX ADMIN — SENNOSIDES AND DOCUSATE SODIUM 1 TABLET: 8.6; 5 TABLET ORAL at 08:56

## 2020-01-01 RX ADMIN — MELATONIN 3 MG: at 21:03

## 2020-01-01 RX ADMIN — INSULIN LISPRO 7 UNITS: 100 INJECTION, SOLUTION INTRAVENOUS; SUBCUTANEOUS at 17:50

## 2020-01-01 RX ADMIN — INSULIN LISPRO 7 UNITS: 100 INJECTION, SOLUTION INTRAVENOUS; SUBCUTANEOUS at 11:03

## 2020-01-01 RX ADMIN — CARVEDILOL 6.25 MG: 6.25 TABLET, FILM COATED ORAL at 07:40

## 2020-01-01 RX ADMIN — CEFTRIAXONE 1 G: 1 INJECTION, POWDER, FOR SOLUTION INTRAMUSCULAR; INTRAVENOUS at 12:40

## 2020-01-01 RX ADMIN — INSULIN LISPRO 3 UNITS: 100 INJECTION, SOLUTION INTRAVENOUS; SUBCUTANEOUS at 08:07

## 2020-01-01 RX ADMIN — FUROSEMIDE 40 MG: 10 INJECTION, SOLUTION INTRAMUSCULAR; INTRAVENOUS at 21:26

## 2020-01-01 RX ADMIN — Medication 10 ML: at 16:37

## 2020-01-01 RX ADMIN — FAMOTIDINE 20 MG: 20 TABLET ORAL at 08:14

## 2020-01-01 RX ADMIN — CLOPIDOGREL BISULFATE 75 MG: 75 TABLET, FILM COATED ORAL at 08:52

## 2020-01-01 RX ADMIN — CLOPIDOGREL BISULFATE 75 MG: 75 TABLET ORAL at 08:57

## 2020-01-01 RX ADMIN — PHENYLEPHRINE HYDROCHLORIDE 110 MCG/MIN: 10 INJECTION INTRAVENOUS at 22:48

## 2020-01-01 RX ADMIN — INSULIN LISPRO 5 UNITS: 100 INJECTION, SOLUTION INTRAVENOUS; SUBCUTANEOUS at 11:43

## 2020-01-01 RX ADMIN — ASPIRIN 81 MG: 81 TABLET, COATED ORAL at 08:00

## 2020-01-01 RX ADMIN — FUROSEMIDE 20 MG: 10 INJECTION, SOLUTION INTRAMUSCULAR; INTRAVENOUS at 11:38

## 2020-01-01 RX ADMIN — LEVOTHYROXINE SODIUM 88 MCG: 88 TABLET ORAL at 06:21

## 2020-01-01 RX ADMIN — CARVEDILOL 6.25 MG: 6.25 TABLET, FILM COATED ORAL at 18:51

## 2020-01-01 RX ADMIN — AMIODARONE HYDROCHLORIDE 150 MG: 50 INJECTION, SOLUTION INTRAVENOUS at 08:14

## 2020-01-01 RX ADMIN — INSULIN LISPRO 2 UNITS: 100 INJECTION, SOLUTION INTRAVENOUS; SUBCUTANEOUS at 16:17

## 2020-01-01 RX ADMIN — INSULIN GLARGINE 14 UNITS: 100 INJECTION, SOLUTION SUBCUTANEOUS at 22:25

## 2020-01-01 RX ADMIN — Medication 10 ML: at 22:03

## 2020-01-01 RX ADMIN — CARVEDILOL 6.25 MG: 6.25 TABLET, FILM COATED ORAL at 08:07

## 2020-01-01 RX ADMIN — INSULIN GLARGINE 14 UNITS: 100 INJECTION, SOLUTION SUBCUTANEOUS at 22:15

## 2020-01-01 RX ADMIN — DEXTROSE MONOHYDRATE 6 MCG/MIN: 5 INJECTION, SOLUTION INTRAVENOUS at 12:55

## 2020-01-01 RX ADMIN — SENNOSIDES 17.2 MG: 8.6 TABLET, FILM COATED ORAL at 08:36

## 2020-01-01 RX ADMIN — INSULIN LISPRO 7 UNITS: 100 INJECTION, SOLUTION INTRAVENOUS; SUBCUTANEOUS at 12:37

## 2020-01-01 RX ADMIN — Medication 10 ML: at 14:03

## 2020-01-01 RX ADMIN — LISINOPRIL 5 MG: 5 TABLET ORAL at 08:07

## 2020-01-01 RX ADMIN — HEPARIN 500 UNITS: 100 SYRINGE at 15:58

## 2020-01-01 RX ADMIN — FUROSEMIDE 20 MG: 10 INJECTION, SOLUTION INTRAMUSCULAR; INTRAVENOUS at 20:25

## 2020-01-01 RX ADMIN — INSULIN LISPRO 2 UNITS: 100 INJECTION, SOLUTION INTRAVENOUS; SUBCUTANEOUS at 07:33

## 2020-01-01 RX ADMIN — MIDODRINE HYDROCHLORIDE 15 MG: 5 TABLET ORAL at 11:00

## 2020-01-01 RX ADMIN — INSULIN GLARGINE 8 UNITS: 100 INJECTION, SOLUTION SUBCUTANEOUS at 21:26

## 2020-01-01 RX ADMIN — CARVEDILOL 6.25 MG: 6.25 TABLET, FILM COATED ORAL at 06:21

## 2020-01-01 RX ADMIN — POTASSIUM CHLORIDE 40 MEQ: 750 TABLET, FILM COATED, EXTENDED RELEASE ORAL at 09:23

## 2020-01-01 RX ADMIN — Medication 10 ML: at 13:52

## 2020-01-01 RX ADMIN — SODIUM CHLORIDE 75 ML/HR: 900 INJECTION, SOLUTION INTRAVENOUS at 23:49

## 2020-01-01 RX ADMIN — FUROSEMIDE 20 MG: 10 INJECTION, SOLUTION INTRAMUSCULAR; INTRAVENOUS at 08:30

## 2020-01-01 RX ADMIN — MIDODRINE HYDROCHLORIDE 10 MG: 5 TABLET ORAL at 08:14

## 2020-01-01 RX ADMIN — INSULIN LISPRO 2 UNITS: 100 INJECTION, SOLUTION INTRAVENOUS; SUBCUTANEOUS at 21:16

## 2020-01-01 RX ADMIN — ACETAMINOPHEN 650 MG: 325 TABLET ORAL at 04:54

## 2020-01-01 RX ADMIN — PROPOFOL 30 MCG/KG/MIN: 10 INJECTION, EMULSION INTRAVENOUS at 09:44

## 2020-01-01 RX ADMIN — MELATONIN 3 MG: at 21:08

## 2020-01-01 RX ADMIN — PROPOFOL 30 MG: 10 INJECTION, EMULSION INTRAVENOUS at 09:39

## 2020-01-01 RX ADMIN — INSULIN LISPRO 2 UNITS: 100 INJECTION, SOLUTION INTRAVENOUS; SUBCUTANEOUS at 21:47

## 2020-01-01 RX ADMIN — CLOPIDOGREL BISULFATE 75 MG: 75 TABLET, FILM COATED ORAL at 08:36

## 2020-01-01 RX ADMIN — ACETAMINOPHEN 650 MG: 325 TABLET ORAL at 03:52

## 2020-01-01 RX ADMIN — CARVEDILOL 6.25 MG: 6.25 TABLET, FILM COATED ORAL at 08:36

## 2020-01-01 RX ADMIN — CEFEPIME 2 G: 2 INJECTION, POWDER, FOR SOLUTION INTRAVENOUS at 11:55

## 2020-01-01 RX ADMIN — POLYETHYLENE GLYCOL 3350 17 G: 17 POWDER, FOR SOLUTION ORAL at 09:37

## 2020-01-01 RX ADMIN — MELATONIN 3 MG: at 22:05

## 2020-01-01 RX ADMIN — ASPIRIN 81 MG 81 MG: 81 TABLET ORAL at 09:37

## 2020-01-01 RX ADMIN — FUROSEMIDE 20 MG: 10 INJECTION, SOLUTION INTRAMUSCULAR; INTRAVENOUS at 08:55

## 2020-01-01 RX ADMIN — Medication 10 ML: at 06:00

## 2020-01-01 RX ADMIN — AMIODARONE HYDROCHLORIDE 1 MG/MIN: 50 INJECTION, SOLUTION INTRAVENOUS at 07:57

## 2020-01-01 RX ADMIN — INSULIN GLARGINE 8 UNITS: 100 INJECTION, SOLUTION SUBCUTANEOUS at 12:42

## 2020-01-01 RX ADMIN — SODIUM CHLORIDE 1000 ML: 900 INJECTION, SOLUTION INTRAVENOUS at 10:42

## 2020-01-01 RX ADMIN — ASPIRIN 81 MG: 81 TABLET, COATED ORAL at 08:46

## 2020-01-01 RX ADMIN — Medication 10 ML: at 14:00

## 2020-01-01 RX ADMIN — INSULIN LISPRO 3 UNITS: 100 INJECTION, SOLUTION INTRAVENOUS; SUBCUTANEOUS at 06:48

## 2020-01-01 RX ADMIN — CLOPIDOGREL BISULFATE 75 MG: 75 TABLET ORAL at 16:38

## 2020-01-01 RX ADMIN — INSULIN GLARGINE 8 UNITS: 100 INJECTION, SOLUTION SUBCUTANEOUS at 23:40

## 2020-01-01 RX ADMIN — CEFTRIAXONE SODIUM 2 G: 2 INJECTION, POWDER, FOR SOLUTION INTRAMUSCULAR; INTRAVENOUS at 06:04

## 2020-01-01 RX ADMIN — Medication 10 ML: at 06:18

## 2020-01-01 RX ADMIN — FUROSEMIDE 60 MG: 10 INJECTION, SOLUTION INTRAMUSCULAR; INTRAVENOUS at 03:16

## 2020-01-01 RX ADMIN — INSULIN LISPRO 4 UNITS: 100 INJECTION, SOLUTION INTRAVENOUS; SUBCUTANEOUS at 22:09

## 2020-01-01 RX ADMIN — LEVOTHYROXINE SODIUM 88 MCG: 88 TABLET ORAL at 07:20

## 2020-01-01 RX ADMIN — INSULIN LISPRO 5 UNITS: 100 INJECTION, SOLUTION INTRAVENOUS; SUBCUTANEOUS at 17:28

## 2020-01-01 RX ADMIN — DOBUTAMINE HYDROCHLORIDE 2.5 MCG/KG/MIN: 200 INJECTION INTRAVENOUS at 11:36

## 2020-01-01 RX ADMIN — MIDODRINE HYDROCHLORIDE 10 MG: 5 TABLET ORAL at 12:27

## 2020-01-01 RX ADMIN — LEVOTHYROXINE SODIUM 88 MCG: 88 TABLET ORAL at 06:30

## 2020-01-01 RX ADMIN — VANCOMYCIN HYDROCHLORIDE 1500 MG: 10 INJECTION, POWDER, LYOPHILIZED, FOR SOLUTION INTRAVENOUS at 12:16

## 2020-01-01 RX ADMIN — Medication 10 ML: at 16:31

## 2020-01-01 RX ADMIN — INSULIN LISPRO 2 UNITS: 100 INJECTION, SOLUTION INTRAVENOUS; SUBCUTANEOUS at 12:41

## 2020-01-01 RX ADMIN — INSULIN LISPRO 5 UNITS: 100 INJECTION, SOLUTION INTRAVENOUS; SUBCUTANEOUS at 12:09

## 2020-01-01 RX ADMIN — MORPHINE SULFATE 2 MG: 2 INJECTION, SOLUTION INTRAMUSCULAR; INTRAVENOUS at 19:55

## 2020-01-01 RX ADMIN — CLOPIDOGREL BISULFATE 75 MG: 75 TABLET ORAL at 10:18

## 2020-01-01 RX ADMIN — ACETAMINOPHEN 650 MG: 325 TABLET ORAL at 17:13

## 2020-01-01 RX ADMIN — ACETAMINOPHEN 650 MG: 325 TABLET ORAL at 22:54

## 2020-01-01 RX ADMIN — ACETAMINOPHEN 650 MG: 325 TABLET, FILM COATED ORAL at 20:25

## 2020-01-01 RX ADMIN — CARVEDILOL 6.25 MG: 6.25 TABLET, FILM COATED ORAL at 21:22

## 2020-01-01 RX ADMIN — CEFTRIAXONE 1 G: 1 INJECTION, POWDER, FOR SOLUTION INTRAMUSCULAR; INTRAVENOUS at 11:43

## 2020-01-01 RX ADMIN — CARVEDILOL 6.25 MG: 3.12 TABLET, FILM COATED ORAL at 08:46

## 2020-01-01 RX ADMIN — MIDODRINE HYDROCHLORIDE 10 MG: 5 TABLET ORAL at 11:10

## 2020-01-01 RX ADMIN — INSULIN LISPRO 5 UNITS: 100 INJECTION, SOLUTION INTRAVENOUS; SUBCUTANEOUS at 17:33

## 2020-01-01 RX ADMIN — ASPIRIN 81 MG 81 MG: 81 TABLET ORAL at 10:26

## 2020-01-01 RX ADMIN — CLOPIDOGREL BISULFATE 75 MG: 75 TABLET, FILM COATED ORAL at 09:36

## 2020-01-01 RX ADMIN — AMIODARONE HYDROCHLORIDE 1 MG/MIN: 50 INJECTION, SOLUTION INTRAVENOUS at 18:15

## 2020-01-01 RX ADMIN — ACETAMINOPHEN 650 MG: 325 TABLET ORAL at 19:57

## 2020-01-01 RX ADMIN — DIPHENHYDRAMINE HYDROCHLORIDE AND LIDOCAINE HYDROCHLORIDE AND ALUMINUM HYDROXIDE AND MAGNESIUM HYDRO 5 ML: KIT at 17:37

## 2020-01-01 RX ADMIN — FUROSEMIDE 40 MG: 10 INJECTION, SOLUTION INTRAMUSCULAR; INTRAVENOUS at 08:26

## 2020-01-01 RX ADMIN — FUROSEMIDE 20 MG: 10 INJECTION, SOLUTION INTRAMUSCULAR; INTRAVENOUS at 17:38

## 2020-01-01 RX ADMIN — INSULIN LISPRO 5 UNITS: 100 INJECTION, SOLUTION INTRAVENOUS; SUBCUTANEOUS at 16:31

## 2020-01-01 RX ADMIN — DIPHENHYDRAMINE HYDROCHLORIDE AND LIDOCAINE HYDROCHLORIDE AND ALUMINUM HYDROXIDE AND MAGNESIUM HYDRO 5 ML: KIT at 07:14

## 2020-01-01 RX ADMIN — INSULIN LISPRO 5 UNITS: 100 INJECTION, SOLUTION INTRAVENOUS; SUBCUTANEOUS at 07:37

## 2020-01-01 RX ADMIN — Medication 10 ML: at 07:15

## 2020-01-01 RX ADMIN — INSULIN LISPRO 2 UNITS: 100 INJECTION, SOLUTION INTRAVENOUS; SUBCUTANEOUS at 07:43

## 2020-01-01 RX ADMIN — CLOPIDOGREL BISULFATE 75 MG: 75 TABLET ORAL at 11:37

## 2020-01-01 RX ADMIN — MIDODRINE HYDROCHLORIDE 10 MG: 5 TABLET ORAL at 16:31

## 2020-01-01 RX ADMIN — INSULIN LISPRO 2 UNITS: 100 INJECTION, SOLUTION INTRAVENOUS; SUBCUTANEOUS at 16:22

## 2020-01-01 RX ADMIN — ALBUMIN (HUMAN) 25 G: 12.5 INJECTION, SOLUTION INTRAVENOUS at 15:19

## 2020-01-01 RX ADMIN — INSULIN LISPRO 7 UNITS: 100 INJECTION, SOLUTION INTRAVENOUS; SUBCUTANEOUS at 17:57

## 2020-01-01 RX ADMIN — HEPARIN SODIUM AND DEXTROSE 12 UNITS/KG/HR: 10000; 5 INJECTION INTRAVENOUS at 20:30

## 2020-01-01 RX ADMIN — FUROSEMIDE 20 MG: 10 INJECTION, SOLUTION INTRAMUSCULAR; INTRAVENOUS at 10:18

## 2020-01-01 RX ADMIN — SIMETHICONE CHEW TAB 80 MG 80 MG: 80 TABLET ORAL at 01:33

## 2020-01-01 RX ADMIN — MIDODRINE HYDROCHLORIDE 10 MG: 5 TABLET ORAL at 16:22

## 2020-01-01 RX ADMIN — Medication 10 ML: at 13:08

## 2020-01-01 RX ADMIN — ASPIRIN 81 MG 81 MG: 81 TABLET ORAL at 08:27

## 2020-01-01 RX ADMIN — INSULIN GLARGINE 13 UNITS: 100 INJECTION, SOLUTION SUBCUTANEOUS at 08:05

## 2020-01-01 RX ADMIN — INSULIN LISPRO 5 UNITS: 100 INJECTION, SOLUTION INTRAVENOUS; SUBCUTANEOUS at 12:31

## 2020-01-01 RX ADMIN — Medication 5 ML: at 06:00

## 2020-01-01 RX ADMIN — Medication 10 ML: at 06:43

## 2020-01-01 RX ADMIN — POLYETHYLENE GLYCOL 3350 17 G: 17 POWDER, FOR SOLUTION ORAL at 08:30

## 2020-01-01 RX ADMIN — Medication 10 ML: at 22:05

## 2020-01-01 RX ADMIN — CARVEDILOL 6.25 MG: 3.12 TABLET, FILM COATED ORAL at 17:12

## 2020-01-01 RX ADMIN — ASPIRIN 81 MG: 81 TABLET, COATED ORAL at 08:18

## 2020-01-01 RX ADMIN — DIPHENHYDRAMINE HYDROCHLORIDE AND LIDOCAINE HYDROCHLORIDE AND ALUMINUM HYDROXIDE AND MAGNESIUM HYDRO 5 ML: KIT at 07:30

## 2020-01-01 RX ADMIN — ASPIRIN 81 MG: 81 TABLET, COATED ORAL at 11:37

## 2020-01-01 RX ADMIN — INSULIN LISPRO 7 UNITS: 100 INJECTION, SOLUTION INTRAVENOUS; SUBCUTANEOUS at 13:06

## 2020-01-01 RX ADMIN — POLYETHYLENE GLYCOL 3350 17 G: 17 POWDER, FOR SOLUTION ORAL at 17:02

## 2020-01-01 RX ADMIN — Medication 10 ML: at 14:27

## 2020-01-01 RX ADMIN — INSULIN LISPRO 3 UNITS: 100 INJECTION, SOLUTION INTRAVENOUS; SUBCUTANEOUS at 21:46

## 2020-01-01 RX ADMIN — EPOETIN ALFA-EPBX 10000 UNITS: 10000 INJECTION, SOLUTION INTRAVENOUS; SUBCUTANEOUS at 21:10

## 2020-01-01 RX ADMIN — LEVOTHYROXINE SODIUM 88 MCG: 88 TABLET ORAL at 07:03

## 2020-01-01 RX ADMIN — FAMOTIDINE 20 MG: 20 TABLET ORAL at 08:41

## 2020-01-01 RX ADMIN — Medication 10 ML: at 22:25

## 2020-01-01 RX ADMIN — INSULIN GLARGINE 8 UNITS: 100 INJECTION, SOLUTION SUBCUTANEOUS at 22:46

## 2020-01-01 RX ADMIN — Medication 10 ML: at 21:48

## 2020-01-01 RX ADMIN — INSULIN GLARGINE 14 UNITS: 100 INJECTION, SOLUTION SUBCUTANEOUS at 21:39

## 2020-01-01 RX ADMIN — INSULIN LISPRO 5 UNITS: 100 INJECTION, SOLUTION INTRAVENOUS; SUBCUTANEOUS at 21:15

## 2020-01-01 RX ADMIN — CEFTRIAXONE 1 G: 1 INJECTION, POWDER, FOR SOLUTION INTRAMUSCULAR; INTRAVENOUS at 11:53

## 2020-01-01 RX ADMIN — LEVOTHYROXINE SODIUM 88 MCG: 88 TABLET ORAL at 06:16

## 2020-01-01 RX ADMIN — MIDODRINE HYDROCHLORIDE 10 MG: 5 TABLET ORAL at 19:43

## 2020-01-01 RX ADMIN — POLYETHYLENE GLYCOL 3350 17 G: 17 POWDER, FOR SOLUTION ORAL at 16:50

## 2020-01-01 RX ADMIN — DIPHENHYDRAMINE HYDROCHLORIDE AND LIDOCAINE HYDROCHLORIDE AND ALUMINUM HYDROXIDE AND MAGNESIUM HYDRO 5 ML: KIT at 16:30

## 2020-01-01 RX ADMIN — MIDODRINE HYDROCHLORIDE 10 MG: 5 TABLET ORAL at 16:38

## 2020-01-01 RX ADMIN — EPOETIN ALFA-EPBX 10000 UNITS: 10000 INJECTION, SOLUTION INTRAVENOUS; SUBCUTANEOUS at 22:30

## 2020-01-01 RX ADMIN — ASPIRIN 81 MG: 81 TABLET, COATED ORAL at 10:17

## 2020-01-01 RX ADMIN — INSULIN LISPRO 3 UNITS: 100 INJECTION, SOLUTION INTRAVENOUS; SUBCUTANEOUS at 12:46

## 2020-01-01 RX ADMIN — INSULIN LISPRO 3 UNITS: 100 INJECTION, SOLUTION INTRAVENOUS; SUBCUTANEOUS at 22:33

## 2020-01-01 RX ADMIN — LEVOTHYROXINE SODIUM 88 MCG: 0.09 TABLET ORAL at 08:35

## 2020-01-01 RX ADMIN — ASPIRIN 81 MG: 81 TABLET, COATED ORAL at 09:23

## 2020-01-01 RX ADMIN — CLOPIDOGREL BISULFATE 75 MG: 75 TABLET ORAL at 08:46

## 2020-01-01 RX ADMIN — INSULIN LISPRO 5 UNITS: 100 INJECTION, SOLUTION INTRAVENOUS; SUBCUTANEOUS at 17:08

## 2020-01-01 RX ADMIN — MAGNESIUM HYDROXIDE 30 ML: 400 SUSPENSION ORAL at 16:22

## 2020-01-01 RX ADMIN — INSULIN LISPRO 3 UNITS: 100 INJECTION, SOLUTION INTRAVENOUS; SUBCUTANEOUS at 11:17

## 2020-01-01 RX ADMIN — CEFEPIME 2 G: 2 INJECTION, POWDER, FOR SOLUTION INTRAVENOUS at 11:10

## 2020-01-01 RX ADMIN — CEFEPIME 2 G: 2 INJECTION, POWDER, FOR SOLUTION INTRAVENOUS at 10:47

## 2020-01-01 RX ADMIN — INSULIN LISPRO 3 UNITS: 100 INJECTION, SOLUTION INTRAVENOUS; SUBCUTANEOUS at 11:10

## 2020-01-01 RX ADMIN — INSULIN LISPRO 7 UNITS: 100 INJECTION, SOLUTION INTRAVENOUS; SUBCUTANEOUS at 16:22

## 2020-01-01 RX ADMIN — Medication 10 ML: at 23:41

## 2020-01-01 RX ADMIN — VITAMIN D 1 TABLET: 25 TAB ORAL at 08:49

## 2020-01-01 RX ADMIN — CEFEPIME 2 G: 2 INJECTION, POWDER, FOR SOLUTION INTRAVENOUS at 13:51

## 2020-01-01 RX ADMIN — MELATONIN 3 MG: at 01:36

## 2020-01-01 RX ADMIN — ASPIRIN 81 MG 81 MG: 81 TABLET ORAL at 08:36

## 2020-01-01 RX ADMIN — ASPIRIN 81 MG 81 MG: 81 TABLET ORAL at 08:44

## 2020-01-01 RX ADMIN — DIPHENHYDRAMINE HYDROCHLORIDE AND LIDOCAINE HYDROCHLORIDE AND ALUMINUM HYDROXIDE AND MAGNESIUM HYDRO 5 ML: KIT at 08:21

## 2020-01-01 RX ADMIN — CEFTRIAXONE 1 G: 1 INJECTION, POWDER, FOR SOLUTION INTRAMUSCULAR; INTRAVENOUS at 16:11

## 2020-01-01 RX ADMIN — Medication 10 ML: at 07:04

## 2020-01-01 RX ADMIN — DIPHENHYDRAMINE HYDROCHLORIDE AND LIDOCAINE HYDROCHLORIDE AND ALUMINUM HYDROXIDE AND MAGNESIUM HYDRO 5 ML: KIT at 11:52

## 2020-01-01 RX ADMIN — AMIODARONE HYDROCHLORIDE 1 MG/MIN: 50 INJECTION, SOLUTION INTRAVENOUS at 21:53

## 2020-01-01 RX ADMIN — SENNOSIDES AND DOCUSATE SODIUM 1 TABLET: 8.6; 5 TABLET ORAL at 11:37

## 2020-01-01 RX ADMIN — ACETAMINOPHEN 650 MG: 325 TABLET ORAL at 03:38

## 2020-01-01 RX ADMIN — INSULIN LISPRO 7 UNITS: 100 INJECTION, SOLUTION INTRAVENOUS; SUBCUTANEOUS at 11:10

## 2020-01-01 RX ADMIN — INSULIN GLARGINE 14 UNITS: 100 INJECTION, SOLUTION SUBCUTANEOUS at 21:47

## 2020-01-01 RX ADMIN — INSULIN LISPRO 3 UNITS: 100 INJECTION, SOLUTION INTRAVENOUS; SUBCUTANEOUS at 21:41

## 2020-01-01 RX ADMIN — INSULIN LISPRO 2 UNITS: 100 INJECTION, SOLUTION INTRAVENOUS; SUBCUTANEOUS at 22:02

## 2020-01-01 RX ADMIN — Medication 10 ML: at 06:03

## 2020-01-01 RX ADMIN — INSULIN LISPRO 2 UNITS: 100 INJECTION, SOLUTION INTRAVENOUS; SUBCUTANEOUS at 07:30

## 2020-01-01 RX ADMIN — Medication 1 CAPSULE: at 08:44

## 2020-01-01 RX ADMIN — VITAMIN D 1 TABLET: 25 TAB ORAL at 08:35

## 2020-01-01 RX ADMIN — LEVOTHYROXINE SODIUM 88 MCG: 88 TABLET ORAL at 06:17

## 2020-01-01 RX ADMIN — FUROSEMIDE 20 MG: 10 INJECTION, SOLUTION INTRAMUSCULAR; INTRAVENOUS at 04:41

## 2020-01-01 RX ADMIN — DOBUTAMINE HYDROCHLORIDE 5 MCG/KG/MIN: 200 INJECTION INTRAVENOUS at 16:00

## 2020-01-01 RX ADMIN — FAMOTIDINE 20 MG: 20 TABLET ORAL at 08:00

## 2020-01-01 RX ADMIN — CLOPIDOGREL BISULFATE 75 MG: 75 TABLET ORAL at 08:41

## 2020-01-01 RX ADMIN — INSULIN LISPRO 5 UNITS: 100 INJECTION, SOLUTION INTRAVENOUS; SUBCUTANEOUS at 11:32

## 2020-01-01 RX ADMIN — LEVOTHYROXINE SODIUM 88 MCG: 88 TABLET ORAL at 06:03

## 2020-01-01 RX ADMIN — FUROSEMIDE 40 MG: 10 INJECTION, SOLUTION INTRAMUSCULAR; INTRAVENOUS at 22:07

## 2020-01-01 RX ADMIN — FUROSEMIDE 20 MG: 10 INJECTION, SOLUTION INTRAMUSCULAR; INTRAVENOUS at 08:49

## 2020-01-01 RX ADMIN — Medication 10 ML: at 22:51

## 2020-01-01 RX ADMIN — CEFTRIAXONE 1 G: 1 INJECTION, POWDER, FOR SOLUTION INTRAMUSCULAR; INTRAVENOUS at 12:42

## 2020-01-01 RX ADMIN — INSULIN LISPRO 7 UNITS: 100 INJECTION, SOLUTION INTRAVENOUS; SUBCUTANEOUS at 12:15

## 2020-01-01 RX ADMIN — INSULIN LISPRO 3 UNITS: 100 INJECTION, SOLUTION INTRAVENOUS; SUBCUTANEOUS at 16:37

## 2020-01-01 RX ADMIN — INSULIN LISPRO 2 UNITS: 100 INJECTION, SOLUTION INTRAVENOUS; SUBCUTANEOUS at 17:02

## 2020-01-01 RX ADMIN — FUROSEMIDE 40 MG: 40 TABLET ORAL at 18:01

## 2020-01-01 RX ADMIN — INSULIN LISPRO 2 UNITS: 100 INJECTION, SOLUTION INTRAVENOUS; SUBCUTANEOUS at 17:12

## 2020-01-01 RX ADMIN — MIDODRINE HYDROCHLORIDE 10 MG: 5 TABLET ORAL at 16:12

## 2020-01-01 RX ADMIN — MORPHINE SULFATE 2 MG: 2 INJECTION, SOLUTION INTRAMUSCULAR; INTRAVENOUS at 01:18

## 2020-01-01 RX ADMIN — ACETAMINOPHEN 650 MG: 325 TABLET, FILM COATED ORAL at 20:13

## 2020-01-01 RX ADMIN — CARVEDILOL 6.25 MG: 6.25 TABLET, FILM COATED ORAL at 08:27

## 2020-01-01 RX ADMIN — Medication 10 ML: at 21:42

## 2020-01-01 RX ADMIN — MILRINONE LACTATE 0.38 MCG/KG/MIN: 200 INJECTION, SOLUTION INTRAVENOUS at 23:58

## 2020-01-01 RX ADMIN — INSULIN LISPRO 3 UNITS: 100 INJECTION, SOLUTION INTRAVENOUS; SUBCUTANEOUS at 18:30

## 2020-01-01 RX ADMIN — CLOPIDOGREL BISULFATE 75 MG: 75 TABLET, FILM COATED ORAL at 08:27

## 2020-01-01 RX ADMIN — INSULIN LISPRO 5 UNITS: 100 INJECTION, SOLUTION INTRAVENOUS; SUBCUTANEOUS at 11:35

## 2020-01-01 RX ADMIN — Medication 10 ML: at 06:31

## 2020-01-01 RX ADMIN — ACETAMINOPHEN 650 MG: 325 TABLET ORAL at 11:17

## 2020-01-01 NOTE — PROGRESS NOTES
Cardiology Progress Note  1/1/2020 2:14 PM    Admit Date: 12/25/2019    Admit Diagnosis: Septic shock ([facility] [address] [A41.9, R65.21]      Assessment:     Active Problems:    Septic shock ([facility] [address] (12/25/2019)        Plan:     Chest Pain  improved    Continue medical therapy  Heart Failure unchanged    CXR yesterday mild interstitial edema. Walking in green, waiting for rehab bed. Alisha Chao MD  [phone]  Cell        Subjective:     Ira Ceron admits to dyspnea. She reports symptoms are improved since yesterday. I will switch lasix to oral to prepare for discharge, 80 every day (increase from prior. ROS: negative except as noted above.     Objective:       Visit Vitals  /61 (BP 1 Location: Right arm, BP Patient Position: At rest)   Pulse 76   Temp 97.8 °F (36.6 °C)   Resp 18   Ht 5' 5\" (1.651 m)   Wt 59.3 kg (130 lb 11.7 oz)   SpO2 98%   BMI 21.76 kg/m²       Current Facility-Administered Medications   Medication Dose Route Frequency    [START ON 1/2/2020] furosemide (LASIX) tablet 80 mg  80 mg Oral DAILY    carvedilol (COREG) tablet 6.25 mg  6.25 mg Oral BID WITH MEALS    alteplase (CATHFLO) 1 mg in sterile water (preservative free) 1 mL injection  1 mg InterCATHeter PRN    bacitracin 500 unit/gram packet 1 Packet  1 Packet Topical PRN    heparin (porcine) pf 500 Units  500 Units InterCATHeter PRN    acetaminophen (TYLENOL) tablet 1,000 mg  1,000 mg Oral Q6H PRN    cefTRIAXone (ROCEPHIN) 1 g in 0.9% sodium chloride (MBP/ADV) 50 mL  1 g IntraVENous Q24H    glycerin (adult) suppository 1 Suppository  1 Suppository Rectal Q12H PRN    evolocumab (REPATHA SURECLICK) pen injection 859 mg (Patient Supplied)  1 mL SubCUTAneous Q 14 DAYS    magic mouthwash cpd (without sucralfate)  5 mL Oral TIDAC    Patient supplied Magic Mouthwash  (Patient Supplied)  5 mL Oral QID PRN    polyethylene glycol (MIRALAX) packet 17 g  17 g Oral DAILY    senna (SENOKOT) tablet 17.2 mg  2 Tab Oral DAILY    oxyCODONE IR (ROXICODONE) tablet 2.5 mg  2.5 mg Oral Q4H PRN    insulin glargine (LANTUS) injection 8 Units  8 Units SubCUTAneous QHS    simethicone (MYLICON) tablet 80 mg  80 mg Oral QID PRN    sodium chloride (NS) flush 5-10 mL  5-10 mL IntraVENous PRN    sodium chloride (NS) flush 5-40 mL  5-40 mL IntraVENous Q8H    sodium chloride (NS) flush 5-40 mL  5-40 mL IntraVENous PRN    clopidogrel (PLAVIX) tablet 75 mg  75 mg Oral DAILY    aspirin chewable tablet 81 mg  81 mg Oral DAILY    levothyroxine (SYNTHROID) tablet 88 mcg  88 mcg Oral 6am    glucose chewable tablet 16 g  4 Tab Oral PRN    glucagon (GLUCAGEN) injection 1 mg  1 mg IntraMUSCular PRN    dextrose 10% infusion 0-250 mL  0-250 mL IntraVENous PRN    insulin lispro (HUMALOG) injection   SubCUTAneous AC&HS         Physical Exam:  Visit Vitals  /61 (BP 1 Location: Right arm, BP Patient Position: At rest)   Pulse 76   Temp 97.8 °F (36.6 °C)   Resp 18   Ht 5' 5\" (1.651 m) Comment: Historic   Wt 59.3 kg (130 lb 11.7 oz)   SpO2 98%   BMI 21.76 kg/m²     General Appearance:  Well developed, well nourished,alert and oriented x 3,  individual in no acute distress. Ears/Nose/Mouth/Throat:   Hearing grossly normal.         Neck: Supple. Chest:   Lungs some rhonchi to auscultation bilaterally. Possiable left effusion. Cardiovascular:  Regular rate and rhythm, S1, S2 normal, no murmur. Abdomen:   Soft, non-tender, bowel sounds are active. Extremities: No edema bilaterally. Skin: Warm and dry.                Telemetry: normal sinus rhythm    Data Review:     Labs:    Recent Results (from the past 24 hour(s))   GLUCOSE, POC    Collection Time: 12/31/19  4:10 PM   Result Value Ref Range    Glucose (POC) 150 (H) 65 - 100 mg/dL    Performed by Demetrius Gonzales    GLUCOSE, POC    Collection Time: 12/31/19 10:05 PM   Result Value Ref Range    Glucose (POC) 274 (H) 65 - 100 mg/dL    Performed by Whitley Zapien (ELKIN)    METABOLIC PANEL, BASIC Collection Time: 01/01/20  5:50 AM   Result Value Ref Range    Sodium 137 136 - 145 mmol/L    Potassium 3.8 3.5 - 5.1 mmol/L    Chloride 104 97 - 108 mmol/L    CO2 25 21 - 32 mmol/L    Anion gap 8 5 - 15 mmol/L    Glucose 158 (H) 65 - 100 mg/dL    BUN 28 (H) 6 - 20 MG/DL    Creatinine 1.13 (H) 0.55 - 1.02 MG/DL    BUN/Creatinine ratio 25 (H) 12 - 20      GFR est AA 56 (L) >60 ml/min/1.73m2    GFR est non-AA 46 (L) >60 ml/min/1.73m2    Calcium 9.0 8.5 - 10.1 MG/DL   MAGNESIUM    Collection Time: 01/01/20  5:50 AM   Result Value Ref Range    Magnesium 2.0 1.6 - 2.4 mg/dL   PHOSPHORUS    Collection Time: 01/01/20  5:50 AM   Result Value Ref Range    Phosphorus 3.2 2.6 - 4.7 MG/DL   CBC WITH AUTOMATED DIFF    Collection Time: 01/01/20  5:50 AM   Result Value Ref Range    WBC 11.2 (H) 3.6 - 11.0 K/uL    RBC 2.90 (L) 3.80 - 5.20 M/uL    HGB 9.0 (L) 11.5 - 16.0 g/dL    HCT 29.1 (L) 35.0 - 47.0 %    .3 (H) 80.0 - 99.0 FL    MCH 31.0 26.0 - 34.0 PG    MCHC 30.9 30.0 - 36.5 g/dL    RDW 18.9 (H) 11.5 - 14.5 %    PLATELET 880 277 - 419 K/uL    MPV 11.1 8.9 - 12.9 FL    NRBC 0.3 (H) 0  WBC    ABSOLUTE NRBC 0.03 (H) 0.00 - 0.01 K/uL    NEUTROPHILS 81 (H) 32 - 75 %    LYMPHOCYTES 10 (L) 12 - 49 %    MONOCYTES 7 5 - 13 %    EOSINOPHILS 1 0 - 7 %    BASOPHILS 1 0 - 1 %    IMMATURE GRANULOCYTES 0 %    ABS. NEUTROPHILS 9.1 (H) 1.8 - 8.0 K/UL    ABS. LYMPHOCYTES 1.1 0.8 - 3.5 K/UL    ABS. MONOCYTES 0.8 0.0 - 1.0 K/UL    ABS. EOSINOPHILS 0.1 0.0 - 0.4 K/UL    ABS. BASOPHILS 0.1 0.0 - 0.1 K/UL    ABS. IMM.  GRANS. 0.0 K/UL    DF MANUAL      RBC COMMENTS ANISOCYTOSIS  2+        RBC COMMENTS HYPOCHROMIA  1+        RBC COMMENTS MACROCYTOSIS  1+        RBC COMMENTS POLYCHROMASIA  1+       GLUCOSE, POC    Collection Time: 01/01/20  7:04 AM   Result Value Ref Range    Glucose (POC) 144 (H) 65 - 100 mg/dL    Performed by Grace Nina ([facility])        Edgar Tay MD

## 2020-01-01 NOTE — PROGRESS NOTES
[address] [facility] Adult  Hospitalist Group                                                                                          Hospitalist Progress Note  Catherine Clancy MD  Answering service: [phone] OR [phone] from in house phone        Date of Service:  2020  NAME:  Velma Peck  :  1935  MRN:  [id number]      Admission Summary:   Velma Peck is a 82 y.o. female with a past medical history of metastatic breast cancer on active chemotherapy, HFrEF, frequent UTIs, diabetes, CVA, RA, MI s/p stents who presented to the ED with reports of fever on , she was admitted to the ICU with septic shock. Interval history / Subjective:   Patient is alert and, oriented. Notes she is getting stronger. Family in the room, updated and made aware she could be discharged tomorrow if accepted by rehab. Assessment & Plan:     #. E. coli bacteremia and bacteriuria  #. Septic Shock: 2nd to above- resolved. -Off pressor   -Continue ceftriaxone. -ID following, recommended levofloxacin on discharge. #. NSTEMI: Cardiology following: medical Tx, no procedures planned. #. Acute on chronic systolic CHF: NYHA 4. Ef 66-76%.  -On carvedilol 3.125 mg twice daily, Lasix 40 mg daily. Not on ARN I, ACEI or ARB possibly due to JEROD and unstable renal function. Cardiology on board  #. Acute hypoxic respiratory failure due to CHF exacerbation: - resolved. On room air  - off HFNC. continue lasix 40mg daily, monitor lytes     #. CKD3: monitor renal function, avoid nephrotoxics creatinine improving  #. HypoNatremia: mild, monitor. Sodium normalized  #. HypoPhosphatemia: replace, monitor  #. Constipation: laxatives, Suppositories prn  5. Met. Breast cancer: followed by Dr. Samantha Rausch. S/p recent Chemo- hold chemo till recovers  6. Chronic Anemia: monitor HB, transfuse prn if Hb< 8 due to her NSTEMI   7. Left calf pain: - Duplex -ve for DVTs.   8. DM: SSI, home regimen  #. RA: known to Dr Swetha Crawford, has R wrist swelling/discomfort. Consult placed, he called and advised will f/u op. #LYDIA: She wears CPAP at home, I have requested the family to bring her CPAP or may have to order from here    Code status: Full code  DVT prophylaxis: scds  Discharge inpatient rehab, pending insurance Auth. Hospital Problems  Date Reviewed: 11/13/2019          Codes Class Noted POA    Septic shock ([facility] [address] ICD-10-CM: A41.9, R65.21  ICD-9-CM: 038.9, 785.52, 995.92  12/25/2019 Unknown                Review of Systems:   A comprehensive review of systems was negative except for that written in the HPI. Vital Signs:    Last 24hrs VS reviewed since prior progress note. Most recent are:  Visit Vitals  /75 (BP 1 Location: Right arm, BP Patient Position: At rest)   Pulse (!) 101   Temp 98.3 °F (36.8 °C)   Resp 16   Ht 5' 5\" (1.651 m)   Wt 59.3 kg (130 lb 11.7 oz)   SpO2 98%   BMI 21.76 kg/m²       No intake or output data in the 24 hours ending 01/01/20 0531     Physical Examination:             Constitutional:  No acute distress, cooperative   ENT:  Oral mucous moist, oropharynx benign. Resp:  Has port on the right chest wall. CTA bilaterally. No wheezing/rhonchi/rales. No accessory muscle use   CV:  Regular rhythm, normal rate, no murmurs, gallops, rubs    GI:  Soft, non distended, non tender. normoactive bowel sounds, no hepatosplenomegaly     Musculoskeletal:  SCDs on. No edema, warm, 2+ pulses throughout    Neurologic:  Moves all extremities. AAOx3, CN II-XII reviewed     Psych:  Good insight, Not anxious nor agitated.        Data Review:    Review and/or order of clinical lab test  Review and/or order of tests in the radiology section of CPT  Review and/or order of tests in the medicine section of CPT      Labs:     Recent Labs     01/01/20  0550 12/31/19  0749   WBC 11.2* 14.5*   HGB 9.0* 8.8*   HCT 29.1* 28.3*    185     Recent Labs     01/01/20  0550 12/31/19  0345 12/30/19  0353    137 137   K 3.8 3.8 3.7   CL 104 104 104   CO2 25 25 24   BUN 28* 31* 37*   CREA 1.13* 1.18* 1.34*   * 167* 203*   CA 9.0 8.7 8.3*   MG 2.0 1.9 2.1   PHOS 3.2 1.9* 2.0*     No results for input(s): SGOT, GPT, ALT, AP, TBIL, TBILI, TP, ALB, GLOB, GGT, AML, LPSE in the last 72 hours. No lab exists for component: AMYP, HLPSE  No results for input(s): INR, PTP, APTT, INREXT, INREXT in the last 72 hours. No results for input(s): FE, TIBC, PSAT, FERR in the last 72 hours. No results found for: FOL, RBCF   No results for input(s): PH, PCO2, PO2 in the last 72 hours. No results for input(s): CPK, CKNDX, TROIQ in the last 72 hours.     No lab exists for component: CPKMB  Lab Results   Component Value Date/Time    Cholesterol, total 74 04/16/2018 04:21 AM    HDL Cholesterol 25 04/16/2018 04:21 AM    LDL, calculated 31.6 04/16/2018 04:21 AM    Triglyceride 87 04/16/2018 04:21 AM    CHOL/HDL Ratio 3.0 04/16/2018 04:21 AM     Lab Results   Component Value Date/Time    Glucose (POC) 186 (H) 01/01/2020 04:16 PM    Glucose (POC) 307 (H) 01/01/2020 11:49 AM    Glucose (POC) 144 (H) 01/01/2020 07:04 AM    Glucose (POC) 274 (H) 12/31/2019 10:05 PM    Glucose (POC) 150 (H) 12/31/2019 04:10 PM     Lab Results   Component Value Date/Time    Color YELLOW/STRAW 12/25/2019 01:38 PM    Appearance CLOUDY (A) 12/25/2019 01:38 PM    Specific gravity 1.013 12/25/2019 01:38 PM    pH (UA) 5.5 12/25/2019 01:38 PM    Protein 100 (A) 12/25/2019 01:38 PM    Glucose NEGATIVE  12/25/2019 01:38 PM    Ketone NEGATIVE  12/25/2019 01:38 PM    Bilirubin NEGATIVE  12/25/2019 01:38 PM    Urobilinogen 0.2 12/25/2019 01:38 PM    Nitrites POSITIVE (A) 12/25/2019 01:38 PM    Leukocyte Esterase MODERATE (A) 12/25/2019 01:38 PM    Epithelial cells FEW 12/25/2019 01:38 PM    Bacteria 4+ (A) 12/25/2019 01:38 PM    WBC >100 (H) 12/25/2019 01:38 PM    RBC 0-5 12/25/2019 01:38 PM         Medications Reviewed:     Current Facility-Administered Medications   Medication Dose Route Frequency    [START ON 1/2/2020] furosemide (LASIX) tablet 80 mg  80 mg Oral DAILY    carvedilol (COREG) tablet 6.25 mg  6.25 mg Oral BID WITH MEALS    alteplase (CATHFLO) 1 mg in sterile water (preservative free) 1 mL injection  1 mg InterCATHeter PRN    bacitracin 500 unit/gram packet 1 Packet  1 Packet Topical PRN    heparin (porcine) pf 500 Units  500 Units InterCATHeter PRN    acetaminophen (TYLENOL) tablet 1,000 mg  1,000 mg Oral Q6H PRN    cefTRIAXone (ROCEPHIN) 1 g in 0.9% sodium chloride (MBP/ADV) 50 mL  1 g IntraVENous Q24H    glycerin (adult) suppository 1 Suppository  1 Suppository Rectal Q12H PRN    evolocumab (REPATHA SURECLICK) pen injection 804 mg (Patient Supplied)  1 mL SubCUTAneous Q 14 DAYS    magic mouthwash cpd (without sucralfate)  5 mL Oral TIDAC    Patient supplied Magic Mouthwash  (Patient Supplied)  5 mL Oral QID PRN    polyethylene glycol (MIRALAX) packet 17 g  17 g Oral DAILY    senna (SENOKOT) tablet 17.2 mg  2 Tab Oral DAILY    oxyCODONE IR (ROXICODONE) tablet 2.5 mg  2.5 mg Oral Q4H PRN    insulin glargine (LANTUS) injection 8 Units  8 Units SubCUTAneous QHS    simethicone (MYLICON) tablet 80 mg  80 mg Oral QID PRN    sodium chloride (NS) flush 5-10 mL  5-10 mL IntraVENous PRN    sodium chloride (NS) flush 5-40 mL  5-40 mL IntraVENous Q8H    sodium chloride (NS) flush 5-40 mL  5-40 mL IntraVENous PRN    clopidogrel (PLAVIX) tablet 75 mg  75 mg Oral DAILY    aspirin chewable tablet 81 mg  81 mg Oral DAILY    levothyroxine (SYNTHROID) tablet 88 mcg  88 mcg Oral 6am    glucose chewable tablet 16 g  4 Tab Oral PRN    glucagon (GLUCAGEN) injection 1 mg  1 mg IntraMUSCular PRN    dextrose 10% infusion 0-250 mL  0-250 mL IntraVENous PRN    insulin lispro (HUMALOG) injection   SubCUTAneous AC&HS     ______________________________________________________________________  EXPECTED LENGTH OF STAY: - - -  ACTUAL LENGTH OF STAY:          7                 Lexus Greenfield, MD

## 2020-01-01 NOTE — PROGRESS NOTES
Problem: Pressure Injury - Risk of  Goal: *Prevention of pressure injury  Description  Document Calderon Scale and appropriate interventions in the flowsheet. Outcome: Progressing Towards Goal  Note: Pressure Injury Interventions:       Moisture Interventions: Apply protective barrier, creams and emollients    Activity Interventions: Assess need for specialty bed    Mobility Interventions: Pressure redistribution bed/mattress (bed type)    Nutrition Interventions: Document food/fluid/supplement intake    Friction and Shear Interventions: Lift sheet                Problem: Patient Education: Go to Patient Education Activity  Goal: Patient/Family Education  Outcome: Progressing Towards Goal     Problem: Falls - Risk of  Goal: *Absence of Falls  Description  Document Brunilda Diaz Fall Risk and appropriate interventions in the flowsheet.   Outcome: Progressing Towards Goal  Note: Fall Risk Interventions:  Mobility Interventions: Patient to call before getting OOB, Strengthening exercises (ROM-active/passive), Utilize walker, cane, or other assistive device         Medication Interventions: Patient to call before getting OOB    Elimination Interventions: Call light in reach              Problem: Patient Education: Go to Patient Education Activity  Goal: Patient/Family Education  Outcome: Progressing Towards Goal     Problem: Patient Education: Go to Patient Education Activity  Goal: Patient/Family Education  Outcome: Progressing Towards Goal     Problem: Sepsis: Day 5  Goal: *Demonstrates progressive activity  Outcome: Progressing Towards Goal  Goal: *Discharge plan identified  Outcome: Progressing Towards Goal  Goal: Activity/Safety  Outcome: Progressing Towards Goal  Goal: Consults, if ordered  Outcome: Progressing Towards Goal  Goal: Diagnostic Test/Procedures  Outcome: Progressing Towards Goal  Goal: Nutrition/Diet  Outcome: Progressing Towards Goal  Goal: Discharge Planning  Outcome: Progressing Towards Goal  Goal: Medications  Outcome: Progressing Towards Goal  Goal: Respiratory  Outcome: Progressing Towards Goal  Goal: Treatments/Interventions/Procedures  Outcome: Progressing Towards Goal  Goal: Psychosocial  Outcome: Progressing Towards Goal     Problem: Sepsis: Day 6  Goal: Off Pathway (Use only if patient is Off Pathway)  Outcome: Progressing Towards Goal  Goal: *Oxygen saturation within defined limits  Outcome: Progressing Towards Goal  Goal: *Vital signs within defined limits  Outcome: Progressing Towards Goal  Goal: *Tolerating diet  Outcome: Progressing Towards Goal  Goal: *Demonstrates progressive activity  Outcome: Progressing Towards Goal  Goal: Influenza immunization  Outcome: Progressing Towards Goal  Goal: *Pneumococcal immunization  Outcome: Progressing Towards Goal  Goal: Activity/Safety  Outcome: Progressing Towards Goal  Goal: Diagnostic Test/Procedures  Outcome: Progressing Towards Goal  Goal: Nutrition/Diet  Outcome: Progressing Towards Goal  Goal: Discharge Planning  Outcome: Progressing Towards Goal  Goal: Medications  Outcome: Progressing Towards Goal  Goal: Respiratory  Outcome: Progressing Towards Goal  Goal: Treatments/Interventions/Procedures  Outcome: Progressing Towards Goal  Goal: Psychosocial  Outcome: Progressing Towards Goal     Problem: Sepsis: Discharge Outcomes  Goal: *Vital signs within defined limits  Outcome: Progressing Towards Goal  Goal: *Tolerating diet  Outcome: Progressing Towards Goal  Goal: *Verbalizes understanding and describes prescribed diet  Outcome: Progressing Towards Goal  Goal: *Demonstrates progressive activity  Outcome: Progressing Towards Goal  Goal: *Describes follow-up/return visits to physicians  Outcome: Progressing Towards Goal  Goal: *Verbalizes name, dosage, time, side effects, and number of days to continue medications  Outcome: Progressing Towards Goal  Goal: *Influenza immunization (Oct-Mar only)  Outcome: Progressing Towards Goal  Goal: *Pneumococcal immunization  Outcome: Progressing Towards Goal  Goal: *Lungs clear or at baseline  Outcome: Progressing Towards Goal  Goal: *Oxygen saturation returns to baseline or 90% or better on room air  Outcome: Progressing Towards Goal  Goal: *Glycemic control  Outcome: Progressing Towards Goal  Goal: *Absence of deep venous thrombosis signs and symptoms(Stroke Metric)  Outcome: Progressing Towards Goal  Goal: *Describes available resources and support systems  Outcome: Progressing Towards Goal  Goal: *Optimal pain control at patient's stated goal  Outcome: Progressing Towards Goal     Problem: Diabetes Self-Management  Goal: *Disease process and treatment process  Description  Define diabetes and identify own type of diabetes; list 3 options for treating diabetes. Outcome: Progressing Towards Goal  Goal: *Incorporating nutritional management into lifestyle  Description  Describe effect of type, amount and timing of food on blood glucose; list 3 methods for planning meals. Outcome: Progressing Towards Goal  Goal: *Incorporating physical activity into lifestyle  Description  State effect of exercise on blood glucose levels. Outcome: Progressing Towards Goal  Goal: *Developing strategies to promote health/change behavior  Description  Define the ABC's of diabetes; identify appropriate screenings, schedule and personal plan for screenings. Outcome: Progressing Towards Goal  Goal: *Using medications safely  Description  State effect of diabetes medications on diabetes; name diabetes medication taking, action and side effects. Outcome: Progressing Towards Goal  Goal: *Monitoring blood glucose, interpreting and using results  Description  Identify recommended blood glucose targets  and personal targets.   Outcome: Progressing Towards Goal  Goal: *Prevention, detection, treatment of acute complications  Description  List symptoms of hyper- and hypoglycemia; describe how to treat low blood sugar and actions for lowering  high blood glucose level. Outcome: Progressing Towards Goal  Goal: *Prevention, detection and treatment of chronic complications  Description  Define the natural course of diabetes and describe the relationship of blood glucose levels to long term complications of diabetes.   Outcome: Progressing Towards Goal  Goal: *Developing strategies to address psychosocial issues  Description  Describe feelings about living with diabetes; identify support needed and support network  Outcome: Progressing Towards Goal  Goal: *Insulin pump training  Outcome: Progressing Towards Goal  Goal: *Sick day guidelines  Outcome: Progressing Towards Goal  Goal: *Patient Specific Goal (EDIT GOAL, INSERT TEXT)  Outcome: Progressing Towards Goal     Problem: Patient Education: Go to Patient Education Activity  Goal: Patient/Family Education  Outcome: Progressing Towards Goal     Problem: Discharge Planning  Goal: *Discharge to safe environment  Outcome: Progressing Towards Goal     Problem: Patient Education: Go to Patient Education Activity  Goal: Patient/Family Education  Outcome: Progressing Towards Goal     Problem: Patient Education: Go to Patient Education Activity  Goal: Patient/Family Education  Outcome: Progressing Towards Goal

## 2020-01-02 NOTE — PROGRESS NOTES
[address] [facility] Adult  Hospitalist Group                                                                                          Hospitalist Progress Note  Suzanne Carrasco MD  Answering service: [phone] OR [phone] from in house phone        Date of Service:  2020  NAME:  Fleet Area  :  1935  MRN:  [id number]      Admission Summary:   Fleet Area is a 82 y.o. female with a past medical history of metastatic breast cancer on active chemotherapy, HFrEF, frequent UTIs, diabetes, CVA, RA, MI s/p stents who presented to the ED with reports of fever on , she was admitted to the ICU with septic shock. Interval history / Subjective:   Patient up in chair eating breakfast.No complaints. Family in the room  They are aware she will be discharged today if [facility] confirmed acceptance/insurance auth. Assessment & Plan:     #. E. coli bacteremia and bacteriuria  #. Septic Shock: 2nd to above- resolved. -Off pressor   -Continue ceftriaxone. -ID following, recommended levofloxacin on discharge. #. NSTEMI: Cardiology following: medical Tx, no procedures planned.  -on aspirin,plavix,and coreg. She is allergic to statins  -No ACE-I/ARB due to ckd/unstable GFR. #. Acute on chronic systolic CHF: NYHA 4. Ef 67-06%.  -On carvedilol 3.125 mg twice daily, Lasix 40 mg daily. Not on ARN I, ACEI or ARB possibly due to JEROD and unstable renal function. Cardiology on board  #. Acute hypoxic respiratory failure due to CHF exacerbation: - resolved. On room air  - off HFNC. continue lasix 40mg daily, monitor lytes     #. CKD3: monitor renal function, avoid nephrotoxics creatinine improving  -Maintain on lasix 80 mg every day per cards. Watch creatinine,stabe/better thus far. #. HypoNatremia: mild, monitor. Sodium normalized  #. HypoPhosphatemia: replace, monitor  #. Constipation: laxatives, Suppositories prn  5. Met. Breast cancer: followed by Dr. Leo Irby.  S/p recent Chemo- hold chemo till recovers  6. Chronic Anemia: monitor HB, transfuse prn if Hb< 8 due to her NSTEMI   7. Left calf pain: - Duplex -ve for DVTs. 8. DM: SSI, home regimen  #. RA: known to Dr Chelsea Moe, has R wrist swelling/discomfort. Consult placed, he called and advised will f/u op. #LYDIA: She wears CPAP at home, I have requested the family to bring her CPAP or may have to order from here    Code status: Full code  DVT prophylaxis: scds  Discharge inpatient rehab, pending insurance Auth. Hospital Problems  Date Reviewed: 11/13/2019          Codes Class Noted POA    Septic shock ([facility] [address] ICD-10-CM: A41.9, R65.21  ICD-9-CM: 038.9, 785.52, 995.92  12/25/2019 Unknown                Review of Systems:   A comprehensive review of systems was negative except for that written in the HPI. Vital Signs:    Last 24hrs VS reviewed since prior progress note. Most recent are:  Visit Vitals  /57 (BP 1 Location: Right arm, BP Patient Position: At rest)   Pulse 79   Temp 97.7 °F (36.5 °C)   Resp 18   Ht 5' 5\" (1.651 m)   Wt 59.3 kg (130 lb 11.7 oz)   SpO2 98%   BMI 21.76 kg/m²       No intake or output data in the 24 hours ending 01/02/20 0904     Physical Examination:             Constitutional:  No acute distress, cooperative   ENT:  Oral mucous moist, oropharynx benign. Resp:  Has port on the right chest wall. CTA bilaterally. No wheezing/rhonchi/rales. No accessory muscle use   CV:  Regular rhythm, normal rate, no murmurs, gallops, rubs    GI:  Soft, non distended, non tender. normoactive bowel sounds, no hepatosplenomegaly     Musculoskeletal:  SCDs on. No edema, warm, 2+ pulses throughout    Neurologic:  Moves all extremities. AAOx3, CN II-XII reviewed     Psych:  Good insight, Not anxious nor agitated.        Data Review:    Review and/or order of clinical lab test  Review and/or order of tests in the radiology section of CPT  Review and/or order of tests in the medicine section of CPT      Labs:     Recent Labs 01/01/20  0550 12/31/19  0749   WBC 11.2* 14.5*   HGB 9.0* 8.8*   HCT 29.1* 28.3*    185     Recent Labs     01/02/20  0345 01/01/20  0550 12/31/19  0345    137 137   K 3.5 3.8 3.8    104 104   CO2 27 25 25   BUN 28* 28* 31*   CREA 1.24* 1.13* 1.18*   * 158* 167*   CA 8.2* 9.0 8.7   MG 1.8 2.0 1.9   PHOS 3.4 3.2 1.9*     Recent Labs     01/02/20  0345   SGOT 15   ALT 29   *   TBILI 0.3   TP 6.3*   ALB 2.9*   GLOB 3.4     No results for input(s): INR, PTP, APTT, INREXT, INREXT in the last 72 hours. No results for input(s): FE, TIBC, PSAT, FERR in the last 72 hours. No results found for: FOL, RBCF   No results for input(s): PH, PCO2, PO2 in the last 72 hours. No results for input(s): CPK, CKNDX, TROIQ in the last 72 hours.     No lab exists for component: CPKMB  Lab Results   Component Value Date/Time    Cholesterol, total 74 04/16/2018 04:21 AM    HDL Cholesterol 25 04/16/2018 04:21 AM    LDL, calculated 31.6 04/16/2018 04:21 AM    Triglyceride 87 04/16/2018 04:21 AM    CHOL/HDL Ratio 3.0 04/16/2018 04:21 AM     Lab Results   Component Value Date/Time    Glucose (POC) 158 (H) 01/02/2020 07:17 AM    Glucose (POC) 177 (H) 01/01/2020 10:20 PM    Glucose (POC) 186 (H) 01/01/2020 04:16 PM    Glucose (POC) 307 (H) 01/01/2020 11:49 AM    Glucose (POC) 144 (H) 01/01/2020 07:04 AM     Lab Results   Component Value Date/Time    Color YELLOW/STRAW 12/25/2019 01:38 PM    Appearance CLOUDY (A) 12/25/2019 01:38 PM    Specific gravity 1.013 12/25/2019 01:38 PM    pH (UA) 5.5 12/25/2019 01:38 PM    Protein 100 (A) 12/25/2019 01:38 PM    Glucose NEGATIVE  12/25/2019 01:38 PM    Ketone NEGATIVE  12/25/2019 01:38 PM    Bilirubin NEGATIVE  12/25/2019 01:38 PM    Urobilinogen 0.2 12/25/2019 01:38 PM    Nitrites POSITIVE (A) 12/25/2019 01:38 PM    Leukocyte Esterase MODERATE (A) 12/25/2019 01:38 PM    Epithelial cells FEW 12/25/2019 01:38 PM    Bacteria 4+ (A) 12/25/2019 01:38 PM    WBC >100 (H) 12/25/2019 01:38 PM    RBC 0-5 12/25/2019 01:38 PM         Medications Reviewed:     Current Facility-Administered Medications   Medication Dose Route Frequency    furosemide (LASIX) tablet 80 mg  80 mg Oral DAILY    carvedilol (COREG) tablet 6.25 mg  6.25 mg Oral BID WITH MEALS    alteplase (CATHFLO) 1 mg in sterile water (preservative free) 1 mL injection  1 mg InterCATHeter PRN    bacitracin 500 unit/gram packet 1 Packet  1 Packet Topical PRN    heparin (porcine) pf 500 Units  500 Units InterCATHeter PRN    acetaminophen (TYLENOL) tablet 1,000 mg  1,000 mg Oral Q6H PRN    cefTRIAXone (ROCEPHIN) 1 g in 0.9% sodium chloride (MBP/ADV) 50 mL  1 g IntraVENous Q24H    glycerin (adult) suppository 1 Suppository  1 Suppository Rectal Q12H PRN    evolocumab (REPATHA SURECLICK) pen injection 995 mg (Patient Supplied)  1 mL SubCUTAneous Q 14 DAYS    magic mouthwash cpd (without sucralfate)  5 mL Oral TIDAC    Patient supplied Magic Mouthwash  (Patient Supplied)  5 mL Oral QID PRN    polyethylene glycol (MIRALAX) packet 17 g  17 g Oral DAILY    senna (SENOKOT) tablet 17.2 mg  2 Tab Oral DAILY    oxyCODONE IR (ROXICODONE) tablet 2.5 mg  2.5 mg Oral Q4H PRN    insulin glargine (LANTUS) injection 8 Units  8 Units SubCUTAneous QHS    simethicone (MYLICON) tablet 80 mg  80 mg Oral QID PRN    sodium chloride (NS) flush 5-10 mL  5-10 mL IntraVENous PRN    sodium chloride (NS) flush 5-40 mL  5-40 mL IntraVENous Q8H    sodium chloride (NS) flush 5-40 mL  5-40 mL IntraVENous PRN    clopidogrel (PLAVIX) tablet 75 mg  75 mg Oral DAILY    aspirin chewable tablet 81 mg  81 mg Oral DAILY    levothyroxine (SYNTHROID) tablet 88 mcg  88 mcg Oral 6am    glucose chewable tablet 16 g  4 Tab Oral PRN    glucagon (GLUCAGEN) injection 1 mg  1 mg IntraMUSCular PRN    dextrose 10% infusion 0-250 mL  0-250 mL IntraVENous PRN    insulin lispro (HUMALOG) injection   SubCUTAneous AC&HS ______________________________________________________________________  EXPECTED LENGTH OF STAY: - - -  ACTUAL LENGTH OF STAY:          8                 Laney rAmstrong MD

## 2020-01-02 NOTE — PROGRESS NOTES
Problem: Mobility Impaired (Adult and Pediatric)  Goal: *Acute Goals and Plan of Care (Insert Text)  Description  FUNCTIONAL STATUS PRIOR TO ADMISSION: Patient was modified independent using a rollator for functional mobility. HOME SUPPORT PRIOR TO ADMISSION: The patient lived with family prior to admission and was never home alone. Physical Therapy Goals  Initiated 12/29/2019  1. Patient will move from supine to sit and sit to supine  and roll side to side in bed with supervision/set-up within 7 day(s). 2.  Patient will transfer from bed to chair and chair to bed with minimal assistance/contact guard assist using the least restrictive device within 7 day(s). 3.  Patient will perform sit to stand with minimal assistance/contact guard assist within 7 day(s). 4.  Patient will ambulate with minimal assistance/contact guard assist for 50 feet with the least restrictive device within 7 day(s). 5.  Patient will ascend/descend 5 stairs with 1 handrail(s) with minimal assistance/contact guard assist within 7 day(s). Outcome: Progressing Towards Goal  PHYSICAL THERAPY TREATMENT  Patient: Franco Lam (16 y.o. female)  Date: 1/2/2020  Diagnosis: [facility] [A41.9, R65.21]   <principal problem not specified>       Precautions: Fall  Chart, physical therapy assessment, plan of care and goals were reviewed. ASSESSMENT  Patient continues with skilled PT services and is progressing towards goals. Pt continues with generalized weakness, decreased functional endurance and decline from prior level of function. Pt improving with increased time OOB - for meals and ambulation distance. Current Level of Function Impacting Discharge (mobility/balance): CGA/Min assist x 1 for transfers/ambulation    Other factors to consider for discharge:          PLAN :  Patient continues to benefit from skilled intervention to address the above impairments. Continue treatment per established plan of care.   to address goals. Recommendation for discharge: (in order for the patient to meet his/her long term goals)  Therapy 3 hours per day 5-7 days per week    This discharge recommendation:  Has been made in collaboration with the attending provider and/or case management    IF patient discharges home will need the following DME: patient owns DME required for discharge       SUBJECTIVE:   Patient stated I walked 2 x yesterday.     OBJECTIVE DATA SUMMARY:   Critical Behavior:  Neurologic State: Alert  Orientation Level: Oriented X4  Cognition: Appropriate decision making, Appropriate safety awareness, Follows commands  Safety/Judgement: Awareness of environment  Functional Mobility Training:  Transfers:  Sit to Stand: Contact guard assistance  Stand to Sit: Contact guard assistance(verbal cues to reach back to chair)        Bed to Chair: Contact guard assistance                    Balance:  Sitting: Intact; Without support  Standing: Impaired; With support  Standing - Static: Good;Constant support  Standing - Dynamic : Fair;Constant support  Ambulation/Gait Training:  Distance (ft): 75 Feet (ft)(x 2)  Assistive Device: Walker, rolling;Gait belt  Ambulation - Level of Assistance: Contact guard assistance        Gait Abnormalities: Decreased step clearance        Base of Support: Widened     Speed/Haylee: Slow  Step Length: Right shortened;Left shortened        Interventions: Safety awareness training;Verbal cues(cues to slow down for turns)         Pain Rating:  Pt denied pain. Activity Tolerance:   Fair - increasing time out of chair as tolerated, assist to bathroom  Please refer to the flowsheet for vital signs taken during this treatment.     After treatment patient left in no apparent distress:   Sitting in chair, Call bell within reach, and Bed / chair alarm activated    COMMUNICATION/COLLABORATION:   The patients plan of care was discussed with: Registered Nurse    Izzy Sheriff, PT   Time Calculation: 15 mins

## 2020-01-02 NOTE — PROGRESS NOTES
Hematology-Oncology Progress Note    Kasey Barron  1935  [id number]  1/2/2020    Follow-up for: met.  Breast cancer     [x]        Chart notes since last visit reviewed   [x]        Medications reviewed for allergies and interactions       Case discussed with the following:         []                            []        Nursing Staff                                                                         []        Pathologist                                                                        [x]        FAMILY      Subjective:     Spoke with patient who complains of: stronger, less sob, no c/o pain,no new c/o, ready for transfer to rehab    Objective:     Patient Vitals for the past 24 hrs:   BP Temp Pulse Resp SpO2 Weight   01/02/20 0817 127/57 97.7 °F (36.5 °C) 79 18 98 %    01/02/20 0343 119/61 97.8 °F (36.6 °C) 84 18 100 %    01/02/20 0055      59.3 kg (130 lb 11.7 oz)   01/01/20 2316 130/62 97.3 °F (36.3 °C) 83 16 98 %    01/01/20 1959 100/53 97.7 °F (36.5 °C) 84 16 100 %    01/01/20 1752 136/76  94      01/01/20 1558 170/75 98.3 °F (36.8 °C) (!) 101 16 98 %    01/01/20 1201 114/62 97.8 °F (36.6 °C) 80 18 100 %        REVIEW OF SYSTEMS:    Constitutional: negative fever, negative chills, negative weight loss  Eyes:   negative visual changes  ENT:   negative sore throat, tongue or lip swelling  Respiratory:  negative cough, negative dyspnea  Cards:  negative for chest pain, palpitations, lower extremity edema  GI:   negative for nausea, vomiting, diarrhea, and abdominal pain  Neuro:  negative for headaches, dizziness, vertigo  []                        Full ROS o/w normal/non contributor    Constitutional:  Patient looks  [x]        Sick  [x]        Frail  []        Better                                                 []        Depressed    HEENT:  [x]   NC                         []   AT               []    ALOPECIA     High flow oxygen in place      Eyes: [x]   Normal []    Icteric  Oropharynx: []    Normal                  []  Thrush               []   Dry  Lungs. .. crackles in bases  Abd. Soft non tend  Mucositis: [x]    None         Neck:   [x]   Supple                  []  Rigid               Ext. No cce  [x]        Normal  []        Confused      Available labs reviewed:  Labs:    Recent Results (from the past 24 hour(s))   GLUCOSE, POC    Collection Time: 01/01/20 11:49 AM   Result Value Ref Range    Glucose (POC) 307 (H) 65 - 100 mg/dL    Performed by Herrin Both    GLUCOSE, POC    Collection Time: 01/01/20  4:16 PM   Result Value Ref Range    Glucose (POC) 186 (H) 65 - 100 mg/dL    Performed by Isabela Both    GLUCOSE, POC    Collection Time: 01/01/20 10:20 PM   Result Value Ref Range    Glucose (POC) 177 (H) 65 - 100 mg/dL    Performed by [employer]    METABOLIC PANEL, COMPREHENSIVE    Collection Time: 01/02/20  3:45 AM   Result Value Ref Range    Sodium 136 136 - 145 mmol/L    Potassium 3.5 3.5 - 5.1 mmol/L    Chloride 103 97 - 108 mmol/L    CO2 27 21 - 32 mmol/L    Anion gap 6 5 - 15 mmol/L    Glucose 162 (H) 65 - 100 mg/dL    BUN 28 (H) 6 - 20 MG/DL    Creatinine 1.24 (H) 0.55 - 1.02 MG/DL    BUN/Creatinine ratio 23 (H) 12 - 20      GFR est AA 50 (L) >60 ml/min/1.73m2    GFR est non-AA 41 (L) >60 ml/min/1.73m2    Calcium 8.2 (L) 8.5 - 10.1 MG/DL    Bilirubin, total 0.3 0.2 - 1.0 MG/DL    ALT (SGPT) 29 12 - 78 U/L    AST (SGOT) 15 15 - 37 U/L    Alk.  phosphatase 122 (H) 45 - 117 U/L    Protein, total 6.3 (L) 6.4 - 8.2 g/dL    Albumin 2.9 (L) 3.5 - 5.0 g/dL    Globulin 3.4 2.0 - 4.0 g/dL    A-G Ratio 0.9 (L) 1.1 - 2.2     MAGNESIUM    Collection Time: 01/02/20  3:45 AM   Result Value Ref Range    Magnesium 1.8 1.6 - 2.4 mg/dL   PHOSPHORUS    Collection Time: 01/02/20  3:45 AM   Result Value Ref Range    Phosphorus 3.4 2.6 - 4.7 MG/DL   GLUCOSE, POC    Collection Time: 01/02/20  7:17 AM   Result Value Ref Range    Glucose (POC) 158 (H) 65 - 100 mg/dL Performed by Matilda Rolle reviewed:    Chemotherapy monitored and toxicities assessed:    Assessment and Plan   1. Met. Breast cancer. .. pt. Has had nice response to chemo (abraxane/xeloda) with recent documented decrease in hepatic and axillary involvement. .. her last rx was 12/16 (cycle 5 ) she received 7 days of xeloda ending on 12/22. [place] than neulasta on 12/23. .. she is \"due\" this week but this will be held    2. Leukocytosis secondary to neulasta and sepsis. [place] improving resolved    3. Anemia. [place] secondary to chemotherapy/ckd,, hgb 9 today    4. Gram negative sepsis 4/4 bttls +. [place] ID - E. Coli - pan sensitive. ... abx per Dr. Coty Zamorano    5. Chest pain / sob. .. NSTEMI - Medical Management, EF - low but unchanged from June 2019    Appreciate everyone's help with her care.       Giovana Cotton MD    .

## 2020-01-02 NOTE — PROGRESS NOTES
ID Progress Note  2020    Subjective:     She is looking forward to rehab    Objective:     Antibiotics:  1. Cefepime       Vitals:   Visit Vitals  /64 (BP 1 Location: Left arm, BP Patient Position: At rest)   Pulse 75   Temp 98.3 °F (36.8 °C)   Resp 16   Ht 5' 5\" (1.651 m) Comment: Historic   Wt 59.3 kg (130 lb 11.7 oz)   SpO2 99%   BMI 21.76 kg/m²        Tmax:  Temp (24hrs), Av.9 °F (36.6 °C), Min:97.3 °F (36.3 °C), Max:98.3 °F (36.8 °C)      Exam:  Lungs:  clear to auscultation bilaterally  Heart:  regular rate and rhythm  Abdomen:  soft, non-tender. Bowel sounds normal. No masses,  no organomegaly  Skin:  no rash or abnormalities  Port is benign    Labs:      Recent Labs     20  0345 20  0550 19  0749 19  0345   WBC  --  11.2* 14.5*  --    HGB  --  9.0* 8.8*  --    PLT  --  199 185  --    BUN 28* 28*  --  31*   CREA 1.24* 1.13*  --  1.18*   SGOT 15  --   --   --    *  --   --   --    TBILI 0.3  --   --   --        Cultures:     No results found for: [facility]  Lab Results   Component Value Date/Time    Culture result: NO GROWTH 5 DAYS 2019 04:18 AM    Culture result: ESCHERICHIA COLI (A) 2019 01:38 PM    Culture result: (A) 2019 01:07 PM     ESCHERICHIA COLI GROWING IN ALL 4 BOTTLES DRAWN (SITE=RAC AND L PORT)    Culture result: (A) 2019 01:07 PM     PRELIMINARY REPORT OF GRAM NEGATIVE RODS GROWING IN 3 OF 4 BOTTLES DRAWN CALLED TO AND READ BACK BY  Altagracia Cox RN ON 19 AT 6400.        Radiology:     Line/Insert Date:           Assessment:     1. Bacteremia--likely urinary source  2. Breast cancer, metastatic  3. Leukocytosis--improved  4. Fevers--resolved    Objective:     1. Continue current therapy  2. Follow up cultures and adjust antibiotics as needed  3.  She will be able to be treated with oral cipro to complete a 14 day course of antibiotic therapy--20 is the stop date    Fitz Montaño MD

## 2020-01-02 NOTE — DIABETES MGMT
[facility]    [facility] Consult Note    Recommendations/ Comments: Variable BG's; most < 200 mg/dL. However some results as high as 300 mg/dL, typically post meals.  mg/dL today    If appropriate, please consider   Addition of lispro 3 units AC TID    Current hospital DM medication:   Lispro normals sensitivity correction scale  Tresiba 14 units daily    Consult received for:  [x]             Assessment of home management                []      Medication Recommendations                []             Meter/monitoring     []             Insulin instruction     []             New diagnosis     []             Outpatient education     []             Insulin pump patient     []             Insulin infusion     []             DKA/HHS    Chart reviewed and initial evaluation complete on Sandy Ledesma. Patient is a 82 y.o. female with known DM on Tresiba 14 units daily and Novolog sliding scale AC TID      BG monitoring at home 1-2x/day. Reports occasional BG < 80 mg/dL; rarely > 200 mg/dL      Assessed and instructed patient on the following:   ·  interpretation of lab results, blood sugar goals, complications of diabetes mellitus, hypoglycemia prevention and treatment, illness management, SMBG skills and nutrition    Encouraged the following:   F/U with PCP    Provided patient with the following: [x]             Diabetes Self Care Guide               []             Insulin education materials               []             CHO counting education materials               [x]             Outpatient DTC contact number               []             Glucometer                 Discussed with patient and/or family need for follow up appointment for diabetes management after discharge.       A1c:   Lab Results   Component Value Date/Time    Hemoglobin A1c 6.7 (H) 12/26/2019 04:40 AM       Recent Glucose Results:   Lab Results   Component Value Date/Time     (H) 01/02/2020 03:45 AM    GLUCPOC 158 (H) 01/02/2020 07:17 AM    GLUCPOC 177 (H) 01/01/2020 10:20 PM    GLUCPOC 186 (H) 01/01/2020 04:16 PM        Lab Results   Component Value Date/Time    Creatinine 1.24 (H) 01/02/2020 03:45 AM     Estimated Creatinine Clearance: 31.5 mL/min (A) (based on SCr of 1.24 mg/dL (H)). Active Orders   Diet    DIET DIABETIC CONSISTENT CARB Regular        PO intake: No data found. Will continue to follow as needed. Thank you.   Kisha Cabrera RN, CDE

## 2020-01-02 NOTE — PROGRESS NOTES
1/2/20 -   MOY:  - David Dennis (DC: [phone]) Fely Bertrand is still pending for IPR placement  CRM: Edison Latham, MPH, [address] [place] [address]; Z: [phone]

## 2020-01-02 NOTE — PROGRESS NOTES
1/2/2020     Admit Date: 12/25/2019    Admit Diagnosis: [facility] [A41.9, R65.21]    Active Problems:    Septic shock ([facility] (12/25/2019)        Subjective:  Continues to get stronger. Minimal sob with activity. Wt is down and creatinine stable on current coreg and lasix doses       Sanjuana Melara denies chest pain, palpitations, syncope, orthopnea, paroxysmal nocturnal dyspnea, exertional chest pressure/discomfort, claudication, lower extremity edema. Assessment/Plan:  Would continue current meds for now. Further adjustments as outpt. Awaiting rehab approval.    Future Appointments   Date Time Provider [facility]   1/7/2020  2:40 PM Reji Rashid  [address]   1/16/2020  3:30 PM Maida Valdez MD [facility] [address]   2/24/2020  3:00 PM Obey Dick MD [address] [facility]   3/9/2020  3:00 PM Reji Rashid MD [address], [place]    Objective:     Visit Vitals  /64 (BP 1 Location: Left arm, BP Patient Position: At rest)   Pulse 75   Temp 98.3 °F (36.8 °C)   Resp 16   Ht 5' 5\" (1.651 m) Comment: Historic   Wt 130 lb 11.7 oz (59.3 kg)   SpO2 99%   BMI 21.76 kg/m²        Physical Exam:  Neck: supple, symmetrical, trachea midline, no adenopathy, no carotid bruit and no JVD  Heart: regular rate and rhythm, S1, S2 normal, no S3 or S4, systolic murmur: early systolic 2/6, crescendo at 2nd right intercostal space  Lungs: faint basilar crackles, much better air movement  Abdomen: soft, non-tender. Bowel sounds normal. No masses,  no organomegaly  Extremities: extremities normal, atraumatic, no cyanosis or edema      Labs:    No results for input(s): CPK, CKMB, TROIQ in the last 72 hours.     No lab exists for component: CKQMB, CPKMB  Recent Labs     01/02/20  0345      K 3.5      BUN 28*   CREA 1.24*   *   PHOS 3.4   CA 8.2*     Recent Labs     01/01/20  0550   WBC 11.2*   HGB 9.0*   HCT 29.1*        No results for input(s): CHOL, LDLC in the last 72 hours.     No lab exists for component: TGL, HDLC,  HBA1C    Telemetry: normal sinus rhythm     Current Facility-Administered Medications   Medication Dose Route Frequency    furosemide (LASIX) tablet 80 mg  80 mg Oral DAILY    carvedilol (COREG) tablet 6.25 mg  6.25 mg Oral BID WITH MEALS    alteplase (CATHFLO) 1 mg in sterile water (preservative free) 1 mL injection  1 mg InterCATHeter PRN    bacitracin 500 unit/gram packet 1 Packet  1 Packet Topical PRN    heparin (porcine) pf 500 Units  500 Units InterCATHeter PRN    acetaminophen (TYLENOL) tablet 1,000 mg  1,000 mg Oral Q6H PRN    cefTRIAXone (ROCEPHIN) 1 g in 0.9% sodium chloride (MBP/ADV) 50 mL  1 g IntraVENous Q24H    glycerin (adult) suppository 1 Suppository  1 Suppository Rectal Q12H PRN    evolocumab (REPATHA SURECLICK) pen injection 125 mg (Patient Supplied)  1 mL SubCUTAneous Q 14 DAYS    magic mouthwash cpd (without sucralfate)  5 mL Oral TIDAC    Patient supplied Magic Mouthwash  (Patient Supplied)  5 mL Oral QID PRN    polyethylene glycol (MIRALAX) packet 17 g  17 g Oral DAILY    senna (SENOKOT) tablet 17.2 mg  2 Tab Oral DAILY    oxyCODONE IR (ROXICODONE) tablet 2.5 mg  2.5 mg Oral Q4H PRN    insulin glargine (LANTUS) injection 8 Units  8 Units SubCUTAneous QHS    simethicone (MYLICON) tablet 80 mg  80 mg Oral QID PRN    sodium chloride (NS) flush 5-10 mL  5-10 mL IntraVENous PRN    sodium chloride (NS) flush 5-40 mL  5-40 mL IntraVENous Q8H    sodium chloride (NS) flush 5-40 mL  5-40 mL IntraVENous PRN    clopidogrel (PLAVIX) tablet 75 mg  75 mg Oral DAILY    aspirin chewable tablet 81 mg  81 mg Oral DAILY    levothyroxine (SYNTHROID) tablet 88 mcg  88 mcg Oral 6am    glucose chewable tablet 16 g  4 Tab Oral PRN    glucagon (GLUCAGEN) injection 1 mg  1 mg IntraMUSCular PRN    dextrose 10% infusion 0-250 mL  0-250 mL IntraVENous PRN    insulin lispro (HUMALOG) injection SubCUTAneous AC&HS       Data Review: current meds, labs,recent radiology, intake/output/weight and problem list reviewed

## 2020-01-02 NOTE — PROGRESS NOTES
Bedside and Verbal shift change report given to Vidya Richmond RN  (oncoming nurse) by Angelic Ferguson RN  (offgoing nurse). Report included the following information SBAR.

## 2020-01-02 NOTE — PROGRESS NOTES
TRANSFER - IN REPORT:    Verbal report received from Mehdi Rowland (name) on Muna Ibanez  being received from [address] [place] [address] (unit) for routine progression of care      Report consisted of patients Situation, Background, Assessment and   Recommendations(SBAR). Information from the following report(s) SBAR and Kardex was reviewed with the receiving nurse. Opportunity for questions and clarification was provided. Assessment completed upon patients arrival to unit and care assumed.

## 2020-01-03 NOTE — PROGRESS NOTES
Problem: Mobility Impaired (Adult and Pediatric)  Goal: *Acute Goals and Plan of Care (Insert Text)  Description  FUNCTIONAL STATUS PRIOR TO ADMISSION: Patient was modified independent using a rollator for functional mobility. HOME SUPPORT PRIOR TO ADMISSION: The patient lived with family prior to admission and was never home alone. Physical Therapy Goals  Initiated 12/29/2019  1. Patient will move from supine to sit and sit to supine  and roll side to side in bed with supervision/set-up within 7 day(s). 2.  Patient will transfer from bed to chair and chair to bed with minimal assistance/contact guard assist using the least restrictive device within 7 day(s). 3.  Patient will perform sit to stand with minimal assistance/contact guard assist within 7 day(s). 4.  Patient will ambulate with minimal assistance/contact guard assist for 50 feet with the least restrictive device within 7 day(s). 5.  Patient will ascend/descend 5 stairs with 1 handrail(s) with minimal assistance/contact guard assist within 7 day(s). Outcome: Progressing Towards Goal  PHYSICAL THERAPY TREATMENT  Patient: Muna Ibanez (7 y.o. female)  Date: 1/3/2020  Diagn[facility]: [facility] [A41.9, R65.21]   <principal problem not specified>       Precautions: Fall  Chart, physical therapy assessment, plan of care and goals were reviewed. ASSESSMENT  Patient continues with skilled PT services and is progressing towards goals. Pt presenting with generalized weakness, decreased endurance for functional activities and decline from prior level of function. Current Level of Function Impacting Discharge (mobility/balance): Requires assist/CGA for transfers and standing activities, CGA for short distance ambulation    Other factors to consider for discharge: Pt was independent for amb with rollator         PLAN :  Patient continues to benefit from skilled intervention to address the above impairments.   Continue treatment per established plan of care. to address goals. Recommendation for discharge: (in order for the patient to meet his/her long term goals)  Therapy up to 5 days/week in SNF setting    This discharge recommendation:  Has been made in collaboration with the attending provider and/or case management    IF patient discharges home will need the following DME: patient owns DME required for discharge       SUBJECTIVE:   Patient stated I still feel so weak.     OBJECTIVE DATA SUMMARY:   Critical Behavior:  Neurologic State: Alert  Orientation Level: Oriented X4  Cognition: Follows commands, Appropriate for age attention/concentration  Safety/Judgement: Awareness of environment  Functional Mobility Training:  Bed Mobility:     Supine to Sit: Stand-by assistance  Sit to Supine: Stand-by assistance  Scooting: Stand-by assistance        Transfers:  Sit to Stand: Contact guard assistance;Assist x1  Stand to Sit: Contact guard assistance;Assist x1        Bed to Chair: Contact guard assistance                    Balance:  Sitting: Intact; Without support  Standing: Impaired; With support  Standing - Static: Good;Constant support  Standing - Dynamic : Fair;Constant support  Ambulation/Gait Training:  Distance (ft): 85 Feet (ft)  Assistive Device: Walker, rolling;Gait belt  Ambulation - Level of Assistance: Contact guard assistance        Gait Abnormalities: Decreased step clearance(left foot drop and poor control of left knee in stance)        Base of Support: Widened     Speed/Haylee: Slow  Step Length: Right shortened;Left shortened        Interventions: Safety awareness training;Verbal cues       Therapeutic Exercises:   Seated exercise - pt performed ankle pumps, hip flexion and knee extension - 10 reps all with verbal cues. Pt performed 3 mins of sustained pedaling with floor bike. Pain Rating:  Pt denied pain. Activity Tolerance:   Fair  Please refer to the flowsheet for vital signs taken during this treatment.     After treatment patient left in no apparent distress:   Sitting in chair, Call bell within reach, and Caregiver / family present    COMMUNICATION/COLLABORATION:   The patients plan of care was discussed with: Registered Nurse    Espinoza Gray, PT   Time Calculation: 25 mins

## 2020-01-03 NOTE — PROGRESS NOTES
[address] [facility] Adult  Hospitalist Group                                                                                          Hospitalist Progress Note  Marletta Cheadle, MD  Answering service: [phone] OR [phone] from in house phone        Date of Service:  1/3/2020  NAME:  Kasey Barron  :  1935  MRN:  [id number]      Admission Summary:   Kasey Barron is a 82 y.o. female with a past medical history of metastatic breast cancer on active chemotherapy, HFrEF, frequent UTIs, diabetes, CVA, RA, MI s/p stents who presented to the ED with reports of fever on , she was admitted to the ICU with septic shock. Interval history / Subjective:      Ms. Diane Pantoja continues to feel better and stronger. Appetite has improved  She says she feels bloated, denied nausea, vomiting or diarrhea. Assessment & Plan:     #. E. coli bacteremia and bacteriuria  #. Septic Shock: 2nd to above- resolved. -Off pressor   -Continue ceftriaxone. -ID following, recommended levofloxacin on discharge, stop date . #. NSTEMI: Cardiology following: medical Tx, no procedures planned.  -on aspirin,plavix,and coreg. She is allergic to statins  -No ACE-I/ARB due to ckd/unstable GFR. #. Acute on chronic systolic CHF: NYHA 4. Ef 94-88%.  -On carvedilol 3.125 mg twice daily, Lasix 40 mg daily. Not on ARN I, ACEI or ARB possibly due to JEROD and unstable renal function. Cardiology on board  #. Acute hypoxic respiratory failure due to CHF exacerbation: - resolved. On room air  - off HFNC. continue lasix 80mg daily, monitor lytes     #. CKD3: monitor renal function, avoid nephrotoxics creatinine improving  -Maintain on lasix 80 mg every day per cards. Watch creatinine,stabe/better thus far. #. HypoNatremia: mild, monitor. Sodium normalized  #. HypoPhosphatemia: replace, monitor  #. Constipation: laxatives, Suppositories prn  5. Met. Breast cancer: followed by Dr. Kev Banuelos. S/p recent Chemo- hold chemo till recovers  6.  Chronic Anemia: monitor HB, transfuse prn if Hb< 8 due to her NSTEMI   7. Left calf pain: - Duplex -ve for DVTs. 8. DM: SSI, home regimen  #. RA: known to Dr Jeffrey Hines, has R wrist swelling/discomfort. Consult placed, he called and advised will f/u op. #LYDIA: She wears CPAP at home, I have requested the family to bring her CPAP or may have to order from here    Code status: Full code  DVT prophylaxis: scds  Discharge inpatient rehab, pending insurance Auth. Hospital Problems  Date Reviewed: 11/13/2019          Codes Class Noted POA    Septic shock ([facility] ICD-10-CM: A41.9, R65.21  ICD-9-CM: 038.9, 785.52, 995.92  12/25/2019 Unknown                Review of Systems:   A comprehensive review of systems was negative except for that written in the HPI. Vital Signs:    Last 24hrs VS reviewed since prior progress note. Most recent are:  Visit Vitals  /57 (BP 1 Location: Right arm, BP Patient Position: At rest)   Pulse 77   Temp 98.4 °F (36.9 °C)   Resp 18   Ht 5' 5\" (1.651 m)   Wt 63.3 kg (139 lb 8.8 oz)   SpO2 96%   BMI 23.22 kg/m²       No intake or output data in the 24 hours ending 01/03/20 1410     Physical Examination:             Constitutional:  No acute distress, cooperative   ENT:  Oral mucous moist, oropharynx benign. Resp:  Has port on the right chest wall. CTA bilaterally. No wheezing/rhonchi/rales. No accessory muscle use   CV:  Regular rhythm, normal rate, no murmurs, gallops, rubs    GI:  Soft, non distended, non tender. normoactive bowel sounds, no hepatosplenomegaly     Musculoskeletal:  SCDs on. No edema, warm, 2+ pulses throughout    Neurologic:  Moves all extremities. AAOx3, CN II-XII reviewed     Psych:  Good insight, Not anxious nor agitated.        Data Review:    Review and/or order of clinical lab test  Review and/or order of tests in the radiology section of CPT  Review and/or order of tests in the medicine section of CPT      Labs:     Recent Labs     01/01/20  0550   WBC 11.2*   HGB 9.0*   HCT 29.1*        Recent Labs     01/03/20  0532 01/02/20  0345 01/01/20  0550    136 137   K 3.9 3.5 3.8    103 104   CO2 29 27 25   BUN 30* 28* 28*   CREA 1.14* 1.24* 1.13*   * 162* 158*   CA 8.1* 8.2* 9.0   MG 1.9 1.8 2.0   PHOS 3.1 3.4 3.2     Recent Labs     01/02/20  0345   SGOT 15   ALT 29   *   TBILI 0.3   TP 6.3*   ALB 2.9*   GLOB 3.4     No results for input(s): INR, PTP, APTT, INREXT, INREXT in the last 72 hours. No results for input(s): FE, TIBC, PSAT, FERR in the last 72 hours. No results found for: FOL, RBCF   No results for input(s): PH, PCO2, PO2 in the last 72 hours. No results for input(s): CPK, CKNDX, TROIQ in the last 72 hours.     No lab exists for component: CPKMB  Lab Results   Component Value Date/Time    Cholesterol, total 74 04/16/2018 04:21 AM    HDL Cholesterol 25 04/16/2018 04:21 AM    LDL, calculated 31.6 04/16/2018 04:21 AM    Triglyceride 87 04/16/2018 04:21 AM    CHOL/HDL Ratio 3.0 04/16/2018 04:21 AM     Lab Results   Component Value Date/Time    Glucose (POC) 184 (H) 01/03/2020 11:10 AM    Glucose (POC) 131 (H) 01/03/2020 06:50 AM    Glucose (POC) 200 (H) 01/02/2020 09:16 PM    Glucose (POC) 241 (H) 01/02/2020 04:46 PM    Glucose (POC) 188 (H) 01/02/2020 11:54 AM     Lab Results   Component Value Date/Time    Color YELLOW/STRAW 12/25/2019 01:38 PM    Appearance CLOUDY (A) 12/25/2019 01:38 PM    Specific gravity 1.013 12/25/2019 01:38 PM    pH (UA) 5.5 12/25/2019 01:38 PM    Protein 100 (A) 12/25/2019 01:38 PM    Glucose NEGATIVE  12/25/2019 01:38 PM    Ketone NEGATIVE  12/25/2019 01:38 PM    Bilirubin NEGATIVE  12/25/2019 01:38 PM    Urobilinogen 0.2 12/25/2019 01:38 PM    Nitrites POSITIVE (A) 12/25/2019 01:38 PM    Leukocyte Esterase MODERATE (A) 12/25/2019 01:38 PM    Epithelial cells FEW 12/25/2019 01:38 PM    Bacteria 4+ (A) 12/25/2019 01:38 PM    WBC >100 (H) 12/25/2019 01:38 PM    RBC 0-5 12/25/2019 01:38 PM         Medications Reviewed:     Current Facility-Administered Medications   Medication Dose Route Frequency    furosemide (LASIX) tablet 80 mg  80 mg Oral DAILY    carvedilol (COREG) tablet 6.25 mg  6.25 mg Oral BID WITH MEALS    alteplase (CATHFLO) 1 mg in sterile water (preservative free) 1 mL injection  1 mg InterCATHeter PRN    bacitracin 500 unit/gram packet 1 Packet  1 Packet Topical PRN    heparin (porcine) pf 500 Units  500 Units InterCATHeter PRN    acetaminophen (TYLENOL) tablet 1,000 mg  1,000 mg Oral Q6H PRN    cefTRIAXone (ROCEPHIN) 1 g in 0.9% sodium chloride (MBP/ADV) 50 mL  1 g IntraVENous Q24H    glycerin (adult) suppository 1 Suppository  1 Suppository Rectal Q12H PRN    evolocumab (REPATHA SURECLICK) pen injection 727 mg (Patient Supplied)  1 mL SubCUTAneous Q 14 DAYS    magic mouthwash cpd (without sucralfate)  5 mL Oral TIDAC    Patient supplied Magic Mouthwash  (Patient Supplied)  5 mL Oral QID PRN    polyethylene glycol (MIRALAX) packet 17 g  17 g Oral DAILY    senna (SENOKOT) tablet 17.2 mg  2 Tab Oral DAILY    oxyCODONE IR (ROXICODONE) tablet 2.5 mg  2.5 mg Oral Q4H PRN    insulin glargine (LANTUS) injection 8 Units  8 Units SubCUTAneous QHS    simethicone (MYLICON) tablet 80 mg  80 mg Oral QID PRN    sodium chloride (NS) flush 5-10 mL  5-10 mL IntraVENous PRN    sodium chloride (NS) flush 5-40 mL  5-40 mL IntraVENous Q8H    sodium chloride (NS) flush 5-40 mL  5-40 mL IntraVENous PRN    clopidogrel (PLAVIX) tablet 75 mg  75 mg Oral DAILY    aspirin chewable tablet 81 mg  81 mg Oral DAILY    levothyroxine (SYNTHROID) tablet 88 mcg  88 mcg Oral 6am    glucose chewable tablet 16 g  4 Tab Oral PRN    glucagon (GLUCAGEN) injection 1 mg  1 mg IntraMUSCular PRN    dextrose 10% infusion 0-250 mL  0-250 mL IntraVENous PRN    insulin lispro (HUMALOG) injection   SubCUTAneous AC&HS     ______________________________________________________________________  EXPECTED LENGTH OF STAY: - - -  ACTUAL LENGTH OF STAY:          9                 Marletta Cheadle, MD

## 2020-01-03 NOTE — PROGRESS NOTES
Hematology-Oncology Progress Note    Jag Orellana  1935  [id number]  1/3/2020    Follow-up for: met. Breast cancer     [x]        Chart notes since last visit reviewed   [x]        Medications reviewed for allergies and interactions       Case discussed with the following:         []                            []        Nursing Staff                                                                         []        Pathologist                                                                        [x]        FAMILY      Subjective:     Spoke with patient who complains of: stronger, today, no new c/o    Objective:     Patient Vitals for the past 24 hrs:   BP Temp Pulse Resp SpO2 Weight   01/03/20 0830 112/57 98.4 °F (36.9 °C) 77 18 96 %    01/03/20 0720 129/84  78      01/03/20 0602      63.3 kg (139 lb 8.8 oz)   01/03/20 0227 127/64 98.7 °F (37.1 °C) 80 18 98 %    01/02/20 2029 116/73 98.6 °F (37 °C) 79 18 96 %    01/02/20 1641 151/68 97.8 °F (36.6 °C) 80 18 100 %    01/02/20 1203 113/64 98.3 °F (36.8 °C) 75 16 99 %        REVIEW OF SYSTEMS:    Constitutional: negative fever, negative chills, negative weight loss  Eyes:   negative visual changes  ENT:   negative sore throat, tongue or lip swelling  Respiratory:  negative cough, negative dyspnea  Cards:  negative for chest pain, palpitations, lower extremity edema  GI:   negative for nausea, vomiting, diarrhea, and abdominal pain  Neuro:  negative for headaches, dizziness, vertigo  []                        Full ROS o/w normal/non contributor    Constitutional:  Patient looks  [x]        Sick  [x]        Frail  []        Better                                                 []        Depressed    HEENT:  [x]   NC                         []   AT               []    ALOPECIA     High flow oxygen in place      Eyes: [x]   Normal               []    Icteric  Oropharynx: []    Normal                  []  Thrush               []   Dry  Lungs. .. crackles in bases  Abd. Soft non tend  Mucositis: [x]    None         Neck:   [x]   Supple                  []  Rigid               Ext. No cce  [x]        Normal  []        Confused      Available labs reviewed:  Labs:    Recent Results (from the past 24 hour(s))   GLUCOSE, POC    Collection Time: 01/02/20 11:54 AM   Result Value Ref Range    Glucose (POC) 188 (H) 65 - 100 mg/dL    Performed by Norma Lang (CON)    GLUCOSE, POC    Collection Time: 01/02/20  4:46 PM   Result Value Ref Range    Glucose (POC) 241 (H) 65 - 100 mg/dL    Performed by Jones Mauro    GLUCOSE, POC    Collection Time: 01/02/20  9:16 PM   Result Value Ref Range    Glucose (POC) 200 (H) 65 - 100 mg/dL    Performed by Tenzin Stevenson    METABOLIC PANEL, BASIC    Collection Time: 01/03/20  5:32 AM   Result Value Ref Range    Sodium 138 136 - 145 mmol/L    Potassium 3.9 3.5 - 5.1 mmol/L    Chloride 104 97 - 108 mmol/L    CO2 29 21 - 32 mmol/L    Anion gap 5 5 - 15 mmol/L    Glucose 121 (H) 65 - 100 mg/dL    BUN 30 (H) 6 - 20 MG/DL    Creatinine 1.14 (H) 0.55 - 1.02 MG/DL    BUN/Creatinine ratio 26 (H) 12 - 20      GFR est AA 55 (L) >60 ml/min/1.73m2    GFR est non-AA 46 (L) >60 ml/min/1.73m2    Calcium 8.1 (L) 8.5 - 10.1 MG/DL   MAGNESIUM    Collection Time: 01/03/20  5:32 AM   Result Value Ref Range    Magnesium 1.9 1.6 - 2.4 mg/dL   PHOSPHORUS    Collection Time: 01/03/20  5:32 AM   Result Value Ref Range    Phosphorus 3.1 2.6 - 4.7 MG/DL   GLUCOSE, POC    Collection Time: 01/03/20  6:50 AM   Result Value Ref Range    Glucose (POC) 131 (H) 65 - 100 mg/dL    Performed by Tenzin Stevenson        Available Xrays reviewed:    Chemotherapy monitored and toxicities assessed:    Assessment and Plan   1. Met. Breast cancer. .. pt. Has had nice response to chemo (abraxane/xeloda) with recent documented decrease in hepatic and axillary involvement. .. her last rx was 12/16 (cycle 5 ) she received 7 days of xeloda ending on 12/22. Zeinab lara on 12/23. .. she is \"due\" this week but this will be held until she is discharged from rehab    2. Leukocytosis secondary to neulasta and sepsis. [facility] improving resolved    3. Anemia. [facility] secondary to chemotherapy/ckd,, hgb 9 on 1/1    4. Gram negative sepsis 4/4 bttls +[facility] E. Coli - pan sensitive. ...remains on rocephin abx per Dr. Hailey Tello    5. Chest pain / sob. .. NSTEMI - Medical Management, EF - low but unchanged from June 2019    Appreciate everyone's help with her care.       Scott Dupont MD    .

## 2020-01-03 NOTE — PROGRESS NOTES
ID Progress Note  1/3/2020    Subjective:     She is looking forward to rehab    Objective:     Antibiotics:  1. Cefepime       Vitals:   Visit Vitals  /57 (BP 1 Location: Right arm, BP Patient Position: At rest)   Pulse 77   Temp 98.4 °F (36.9 °C)   Resp 18   Ht 5' 5\" (1.651 m) Comment: Historic   Wt 63.3 kg (139 lb 8.8 oz)   SpO2 96%   BMI 23.22 kg/m²        Tmax:  Temp (24hrs), Av.4 °F (36.9 °C), Min:97.8 °F (36.6 °C), Max:98.7 °F (37.1 °C)      Exam:  Lungs:  clear to auscultation bilaterally  Heart:  regular rate and rhythm  Abdomen:  soft, non-tender. Bowel sounds normal. No masses,  no organomegaly  Skin:  no rash or abnormalities  Port is benign    Labs:      Recent Labs     20  0532 20  0345 20  0550   WBC  --   --  11.2*   HGB  --   --  9.0*   PLT  --   --  199   BUN 30* 28* 28*   CREA 1.14* 1.24* 1.13*   SGOT  --  15  --    AP  --  122*  --    TBILI  --  0.3  --        Cultures:     No results found for: SDES  Lab Results   Component Value Date/Time    Culture result: NO GROWTH 5 DAYS 2019 04:18 AM    Culture result: ESCHERICHIA COLI (A) 2019 01:38 PM    Culture result: (A) 2019 01:07 PM     ESCHERICHIA COLI GROWING IN ALL 4 BOTTLES DRAWN (SITE=RAC AND L PORT)    Culture result: (A) 2019 01:07 PM     PRELIMINARY REPORT OF GRAM NEGATIVE RODS GROWING IN 3 OF 4 BOTTLES DRAWN CALLED TO AND READ BACK BY  Altagracia Cox RN ON 19 AT 2548.        Radiology:     Line/Insert Date:           Assessment:     1. Bacteremia--likely urinary source  2. Breast cancer, metastatic  3. Leukocytosis--improved  4. Fevers--resolved    Objective:     1. Continue current therapy  2. Follow up cultures and adjust antibiotics as needed  3.  She will be able to be treated with oral cipro to complete a 14 day course of antibiotic therapy--20 is the stop date    Malaika Bolton MD

## 2020-01-03 NOTE — ROUTINE PROCESS
Bedside shift change report given to Adryan (oncoming nurse) by Camilla Hemphill (offgoing nurse). Report included the following information SBAR, Kardex, Intake/Output, MAR and Recent Results.

## 2020-01-03 NOTE — PROGRESS NOTES
Transition of Care Plan      Inpatient Rehab; [facility] has accepted the patient, pending auth   Transport: TBD- BLS vs family; CM to assist as needed   [address] Follow up with PCP/Specialist     1:11 PM: Spoke with Se Aguilar [phone], clinical liaison at [facility], and inquired on auth, states will call this writer later with updates.      Jovi Quevedo  Clinical     [phone]

## 2020-01-03 NOTE — PROGRESS NOTES
Problem: Pressure Injury - Risk of  Goal: *Prevention of pressure injury  Description  Document Calderon Scale and appropriate interventions in the flowsheet. Outcome: Progressing Towards Goal  Note: Pressure Injury Interventions:       Moisture Interventions: Absorbent underpads, Apply protective barrier, creams and emollients, Maintain skin hydration (lotion/cream)    Activity Interventions: Pressure redistribution bed/mattress(bed type), PT/OT evaluation    Mobility Interventions: Float heels, HOB 30 degrees or less, Pressure redistribution bed/mattress (bed type)    Nutrition Interventions: Document food/fluid/supplement intake    Friction and Shear Interventions: Apply protective barrier, creams and emollients                Problem: Patient Education: Go to Patient Education Activity  Goal: Patient/Family Education  Outcome: Progressing Towards Goal     Problem: Falls - Risk of  Goal: *Absence of Falls  Description  Document Yaron Fall Risk and appropriate interventions in the flowsheet.   Outcome: Progressing Towards Goal  Note: Fall Risk Interventions:  Mobility Interventions: Utilize walker, cane, or other assistive device         Medication Interventions: Patient to call before getting OOB    Elimination Interventions: Call light in reach, Patient to call for help with toileting needs              Problem: Patient Education: Go to Patient Education Activity  Goal: Patient/Family Education  Outcome: Progressing Towards Goal

## 2020-01-03 NOTE — TELEPHONE ENCOUNTER
Left message for son stating that Dr. Eduar Pinedo has not received a consult for her from the hospitalist as of yet. I asked him to have the hospitalist consult him so that he can come see her.

## 2020-01-03 NOTE — ROUTINE PROCESS
Bedside shift change report given to maria luz troy rn (oncoming nurse) by Amaury Motley (offgoing nurse). Report included the following information SBAR and Kardex.

## 2020-01-03 NOTE — TELEPHONE ENCOUNTER
----- Message from Mario Alberto Dyer sent at 1/2/2020  1:49 PM EST -----  Regarding: Charmayne Crow MD Lorella Charon  Contact: [phone]  Rain Waller (son) stated that a request was sent from the [facility] [place]  for a request for Dr. Arlette Marrufo to visit his mom in the hospital. Son would like to know if request was received and would Dr. Arlette Marrufo be visiting. Best contact number [phone].

## 2020-01-03 NOTE — PROGRESS NOTES
Spiritual Care Partner Volunteer visited patient in 2N on 1.3.20.     Documented by Tomasz Caceres, Staff , on 1.3.20  Request  Support/Spiritual Care Services via [address] [place] [address]

## 2020-01-04 NOTE — ROUTINE PROCESS
Bedside shift change report given to maria luz troy rn (oncoming nurse) by jamee rn (offgoing nurse). Report included the following information SBAR and Kardex.

## 2020-01-04 NOTE — PROGRESS NOTES
Hematology-Oncology Progress Note    Nito Trejo  1935  [id number]  1/4/2020    Follow-up for: met. Breast cancer     [x]        Chart notes since last visit reviewed   [x]        Medications reviewed for allergies and interactions       Case discussed with the following:         []                            []        Nursing Staff                                                                         []        Pathologist                                                                        [x]        FAMILY      Subjective:     Spoke with patient who complains of: stronger, today, no new c/o, says that rehab can't take her until monday    Objective:     Patient Vitals for the past 24 hrs:   BP Temp Pulse Resp SpO2 Weight   01/04/20 0927 109/66 98.5 °F (36.9 °C) 74 16 97 %    01/04/20 0701 128/60  77      01/04/20 0403      62.3 kg (137 lb 5.6 oz)   01/04/20 0259 112/61 97.9 °F (36.6 °C) 77 16 99 %    01/03/20 2056 126/54 98.1 °F (36.7 °C) 75 16 97 %    01/03/20 1500 115/62 97.7 °F (36.5 °C) 79 18 95 %        REVIEW OF SYSTEMS:    Constitutional: negative fever, negative chills, negative weight loss  Eyes:   negative visual changes  ENT:   negative sore throat, tongue or lip swelling  Respiratory:  negative cough, negative dyspnea  Cards:  negative for chest pain, palpitations, lower extremity edema  GI:   negative for nausea, vomiting, diarrhea, and abdominal pain  Neuro:  negative for headaches, dizziness, vertigo  []                        Full ROS o/w normal/non contributor    Constitutional:  Patient looks  [x]        Sick  [x]        Frail  []        Better                                                 []        Depressed    HEENT:  [x]   NC                         []   AT               []    ALOPECIA     High flow oxygen in place      Eyes: [x]   Normal               []    Icteric  Oropharynx: []    Normal                  []  Thrush               []   Dry  Lungs. .. crackles in bases  Abd. Soft non tend  Mucositis: [x]    None         Neck:   [x]   Supple                  []  Rigid               Ext. No cce  [x]        Normal  []        Confused      Available labs reviewed:  Labs:    Recent Results (from the past 24 hour(s))   GLUCOSE, POC    Collection Time: 01/03/20 11:10 AM   Result Value Ref Range    Glucose (POC) 184 (H) 65 - 100 mg/dL    Performed by Nancy Kawaii [employer]    GLUCOSE, POC    Collection Time: 01/03/20  4:12 PM   Result Value Ref Range    Glucose (POC) 225 (H) 65 - 100 mg/dL    Performed by Nancy Kawaii [employer]    GLUCOSE, POC    Collection Time: 01/03/20  9:37 PM   Result Value Ref Range    Glucose (POC) 271 (H) 65 - 100 mg/dL    Performed by [facility]    METABOLIC PANEL, BASIC    Collection Time: 01/04/20  4:46 AM   Result Value Ref Range    Sodium 138 136 - 145 mmol/L    Potassium 3.9 3.5 - 5.1 mmol/L    Chloride 101 97 - 108 mmol/L    CO2 30 21 - 32 mmol/L    Anion gap 7 5 - 15 mmol/L    Glucose 122 (H) 65 - 100 mg/dL    BUN 31 (H) 6 - 20 MG/DL    Creatinine 1.20 (H) 0.55 - 1.02 MG/DL    BUN/Creatinine ratio 26 (H) 12 - 20      GFR est AA 52 (L) >60 ml/min/1.73m2    GFR est non-AA 43 (L) >60 ml/min/1.73m2    Calcium 8.6 8.5 - 10.1 MG/DL   GLUCOSE, POC    Collection Time: 01/04/20  6:54 AM   Result Value Ref Range    Glucose (POC) 107 (H) 65 - 100 mg/dL    Performed by Camillo Oxford        Available Xrays reviewed:    Chemotherapy monitored and toxicities assessed:    Assessment and Plan   1. Met. Breast cancer. .. pt. Has had nice response to chemo (abraxane/xeloda) with recent documented decrease in hepatic and axillary involvement. .. her last rx was 12/16 (cycle 5 ) she received 7 days of xeloda ending on 12/22. [facility] on 12/23. .. she is \"due\" this week but this will be held until she is discharged from rehab    2. Leukocytosis secondary to neulasta and sepsis. [facility] improving resolved    3. Anemia. [facility] secondary to chemotherapy/ckd,, hgb 9 on 1/1    4.  Gram negative sepsis 4/4 bttls +. [facility] ID - E. Coli - pan sensitive. ..will take cipro until 1/7 per Dr. Catrachito Schafer    5. Chest pain / sob. .. NSTEMI - Medical Management, EF - low but unchanged from June 2019    Appreciate everyone's help with her care.       Dawna Pelayo MD    .

## 2020-01-04 NOTE — PROGRESS NOTES
Problem: Pressure Injury - Risk of  Goal: *Prevention of pressure injury  Description  Document Calderon Scale and appropriate interventions in the flowsheet. Outcome: Progressing Towards Goal  Note: Pressure Injury Interventions:       Moisture Interventions: Absorbent underpads, Apply protective barrier, creams and emollients, Maintain skin hydration (lotion/cream)    Activity Interventions: Pressure redistribution bed/mattress(bed type), Increase time out of bed    Mobility Interventions: Float heels, HOB 30 degrees or less    Nutrition Interventions: Document food/fluid/supplement intake    Friction and Shear Interventions: Apply protective barrier, creams and emollients                Problem: Falls - Risk of  Goal: *Absence of Falls  Description  Document Yaron Fall Risk and appropriate interventions in the flowsheet.   Outcome: Progressing Towards Goal  Note: Fall Risk Interventions:  Mobility Interventions: Utilize walker, cane, or other assistive device         Medication Interventions: Patient to call before getting OOB    Elimination Interventions: Call light in reach, Patient to call for help with toileting needs

## 2020-01-04 NOTE — PROGRESS NOTES
Bedside shift change report given to [facility] (oncoming nurse) by Antwan Samson (offgoing nurse). Report included the following information SBAR, Kardex, Intake/Output, MAR and Recent Results.

## 2020-01-04 NOTE — PROGRESS NOTES
[address] [facility] Adult  Hospitalist Group                                                                                          Hospitalist Progress Note  Lexii Drake MD  Answering service: [phone] or [phone] from in house phone        Date of Service:  2020  NAME:  Nito Trejo  :  1935  MRN:  [id number]      Admission Summary:   Nito Trejo is a 82 y.o. female with a past medical history of metastatic breast cancer on active chemotherapy, HFrEF, frequent UTIs, diabetes, CVA, RA, MI s/p stents who presented to the ED with reports of fever on , she was admitted to the ICU with septic shock. Interval history / Subjective:      Ms. Radha Mezal up in chair and feels well. Family in the room. Assessment & Plan:     #. E. coli bacteremia and bacteriuria  #. Septic Shock: 2nd to above- resolved. -Off pressor   -Continue ceftriaxone. -ID following, recommended levofloxacin on discharge, stop date . #. NSTEMI: Cardiology following: medical Tx, no procedures planned.  -on aspirin,plavix,and coreg. She is allergic to statins  -No ACE-I/ARB due to ckd/unstable GFR. #. Acute on chronic systolic CHF: NYHA 4. Ef 87-80%.  -On carvedilol 3.125 mg twice daily, Lasix 40 mg daily. Not on ARN I, ACEI or ARB possibly due to JEROD and unstable renal function. Cardiology on board  #. Acute hypoxic respiratory failure due to CHF exacerbation: - resolved. On room air  - off HFNC. continue lasix 80mg daily, monitor lytes     #. CKD3: monitor renal function, avoid nephrotoxics creatinine improving  -Maintain on lasix 80 mg every day per cards. Watch creatinine,stabe/better thus far. #. HypoNatremia: mild, monitor. Sodium normalized  #. HypoPhosphatemia: replace, monitor  #. Constipation: laxatives, Suppositories prn  5. Met. Breast cancer: followed by Dr. Jalyn Cavazos. S/p recent Chemo- hold chemo till recovers  6. Chronic Anemia: monitor HB, transfuse prn if Hb< 8 due to her NSTEMI   7.  Left calf pain: - Duplex -ve for DVTs. 8. DM: SSI, home regimen  #. RA: known to Dr Tanja Garcia, has R wrist swelling/discomfort. Consult placed, he called and advised will f/u op. #LYDIA: She wears CPAP at home, I have requested the family to bring her CPAP or may have to order from here    Code status: Full code  DVT prophylaxis: scds  Discharge inpatient rehab, pending insurance Auth. Hospital Problems  Date Reviewed: 11/13/2019          Codes Class Noted POA    Septic shock ([facility] [address] ICD-10-CM: A41.9, R65.21  ICD-9-CM: 038.9, 785.52, 995.92  12/25/2019 Unknown                Review of Systems:   A comprehensive review of systems was negative except for that written in the HPI. Vital Signs:    Last 24hrs VS reviewed since prior progress note. Most recent are:  Visit Vitals  /62 (BP 1 Location: Left arm, BP Patient Position: At rest)   Pulse 75   Temp 98.5 °F (36.9 °C)   Resp 16   Ht 5' 5\" (1.651 m)   Wt 62.3 kg (137 lb 5.6 oz)   SpO2 97%   BMI 22.86 kg/m²         Intake/Output Summary (Last 24 hours) at 1/4/2020 1531  Last data filed at 1/4/2020 9172  Gross per 24 hour   Intake 120 ml   Output 350 ml   Net -230 ml        Physical Examination:             Constitutional:  No acute distress, cooperative   ENT:  Oral mucous moist, oropharynx benign. Resp:  Has port on the right chest wall. CTA bilaterally. No wheezing/rhonchi/rales. No accessory muscle use   CV:  Regular rhythm, normal rate, no murmurs, gallops, rubs    GI:  Soft, non distended, non tender. normoactive bowel sounds, no hepatosplenomegaly     Musculoskeletal:  SCDs on. No edema, warm, 2+ pulses throughout    Neurologic:  Moves all extremities. AAOx3, CN II-XII reviewed     Psych:  Good insight, Not anxious nor agitated.        Data Review:    Review and/or order of clinical lab test  Review and/or order of tests in the radiology section of CPT  Review and/or order of tests in the medicine section of CPT      Labs:     No results for input(s): WBC, HGB, HCT, PLT, HGBEXT, HCTEXT, PLTEXT, HGBEXT, HCTEXT, PLTEXT in the last 72 hours. Recent Labs     01/04/20  0446 01/03/20  0532 01/02/20  0345    138 136   K 3.9 3.9 3.5    104 103   CO2 30 29 27   BUN 31* 30* 28*   CREA 1.20* 1.14* 1.24*   * 121* 162*   CA 8.6 8.1* 8.2*   MG  --  1.9 1.8   PHOS  --  3.1 3.4     Recent Labs     01/02/20  0345   SGOT 15   ALT 29   *   TBILI 0.3   TP 6.3*   ALB 2.9*   GLOB 3.4     No results for input(s): INR, PTP, APTT, INREXT, INREXT in the last 72 hours. No results for input(s): FE, TIBC, PSAT, FERR in the last 72 hours. No results found for: FOL, RBCF   No results for input(s): PH, PCO2, PO2 in the last 72 hours. No results for input(s): CPK, CKNDX, TROIQ in the last 72 hours.     No lab exists for component: CPKMB  Lab Results   Component Value Date/Time    Cholesterol, total 74 04/16/2018 04:21 AM    HDL Cholesterol 25 04/16/2018 04:21 AM    LDL, calculated 31.6 04/16/2018 04:21 AM    Triglyceride 87 04/16/2018 04:21 AM    CHOL/HDL Ratio 3.0 04/16/2018 04:21 AM     Lab Results   Component Value Date/Time    Glucose (POC) 202 (H) 01/04/2020 11:21 AM    Glucose (POC) 107 (H) 01/04/2020 06:54 AM    Glucose (POC) 271 (H) 01/03/2020 09:37 PM    Glucose (POC) 225 (H) 01/03/2020 04:12 PM    Glucose (POC) 184 (H) 01/03/2020 11:10 AM     Lab Results   Component Value Date/Time    Color YELLOW/STRAW 12/25/2019 01:38 PM    Appearance CLOUDY (A) 12/25/2019 01:38 PM    Specific gravity 1.013 12/25/2019 01:38 PM    pH (UA) 5.5 12/25/2019 01:38 PM    Protein 100 (A) 12/25/2019 01:38 PM    Glucose NEGATIVE  12/25/2019 01:38 PM    Ketone NEGATIVE  12/25/2019 01:38 PM    Bilirubin NEGATIVE  12/25/2019 01:38 PM    Urobilinogen 0.2 12/25/2019 01:38 PM    Nitrites POSITIVE (A) 12/25/2019 01:38 PM    Leukocyte Esterase MODERATE (A) 12/25/2019 01:38 PM    Epithelial cells FEW 12/25/2019 01:38 PM    Bacteria 4+ (A) 12/25/2019 01:38 PM    WBC >100 (H) 12/25/2019 01:38 PM RBC 0-5 12/25/2019 01:38 PM         Medications Reviewed:     Current Facility-Administered Medications   Medication Dose Route Frequency    polyethylene glycol (MIRALAX) packet 17 g  17 g Oral BID    furosemide (LASIX) tablet 80 mg  80 mg Oral DAILY    carvedilol (COREG) tablet 6.25 mg  6.25 mg Oral BID WITH MEALS    alteplase (CATHFLO) 1 mg in sterile water (preservative free) 1 mL injection  1 mg InterCATHeter PRN    bacitracin 500 unit/gram packet 1 Packet  1 Packet Topical PRN    heparin (porcine) pf 500 Units  500 Units InterCATHeter PRN    acetaminophen (TYLENOL) tablet 1,000 mg  1,000 mg Oral Q6H PRN    cefTRIAXone (ROCEPHIN) 1 g in 0.9% sodium chloride (MBP/ADV) 50 mL  1 g IntraVENous Q24H    glycerin (adult) suppository 1 Suppository  1 Suppository Rectal Q12H PRN    evolocumab (REPATHA SURECLICK) pen injection 865 mg (Patient Supplied)  1 mL SubCUTAneous Q 14 DAYS    magic mouthwash cpd (without sucralfate)  5 mL Oral TIDAC    senna (SENOKOT) tablet 17.2 mg  2 Tab Oral DAILY    oxyCODONE IR (ROXICODONE) tablet 2.5 mg  2.5 mg Oral Q4H PRN    insulin glargine (LANTUS) injection 8 Units  8 Units SubCUTAneous QHS    simethicone (MYLICON) tablet 80 mg  80 mg Oral QID PRN    sodium chloride (NS) flush 5-10 mL  5-10 mL IntraVENous PRN    sodium chloride (NS) flush 5-40 mL  5-40 mL IntraVENous Q8H    sodium chloride (NS) flush 5-40 mL  5-40 mL IntraVENous PRN    clopidogrel (PLAVIX) tablet 75 mg  75 mg Oral DAILY    aspirin chewable tablet 81 mg  81 mg Oral DAILY    levothyroxine (SYNTHROID) tablet 88 mcg  88 mcg Oral 6am    glucose chewable tablet 16 g  4 Tab Oral PRN    glucagon (GLUCAGEN) injection 1 mg  1 mg IntraMUSCular PRN    dextrose 10% infusion 0-250 mL  0-250 mL IntraVENous PRN    insulin lispro (HUMALOG) injection   SubCUTAneous AC&HS     ______________________________________________________________________  EXPECTED LENGTH OF STAY: - - -  ACTUAL LENGTH OF STAY: Barbara Skelton MD

## 2020-01-04 NOTE — PROGRESS NOTES
Bedside shift change report given to Meli Duncan (oncoming nurse) by Jeremías Paz (offgoing nurse). Report included the following information SBAR, Kardex, MAR and Recent Results.

## 2020-01-05 NOTE — PROGRESS NOTES
[facility]                                                                                          Hospitalist Progress Note  Huy Weaver MD  Answering service: [phone] OR [phone] from in house phone        Date of Service:  2020  NAME:  Anahy Noel  :  1935  MRN:  [id number]      Admission Summary:   Anahy Noel is a 82 y.o. female with a past medical history of metastatic breast cancer on active chemotherapy, HFrEF, frequent UTIs, diabetes, CVA, RA, MI s/p stents who presented to the ED with reports of fever on , she was admitted to the ICU with septic shock. Interval history / Subjective:      Ms. Dao Renteria has frequent BM last night,none thus far today   No nausea, vomiting,abdominal pain or fever. Assessment & Plan:     #. E. coli bacteremia and bacteriuria  #. Septic Shock: 2nd to above- resolved. -Off pressor   -Continue ceftriaxone. -ID following, recommended levofloxacin on discharge, stop date . #. NSTEMI: Cardiology following: medical Tx, no procedures planned.  -on aspirin,plavix,and coreg. She is allergic to statins  -No ACE-I/ARB due to ckd/unstable GFR. #. Acute on chronic systolic CHF: NYHA 4. Ef 33-17%.  -On carvedilol and lasix. Not on ARN I, ACEI or ARB possibly due to JEROD and unstable renal function. Cardiology on board    #. Acute hypoxic respiratory failure due to CHF exacerbation: - resolved. On room air  - off HFNC. continue lasix 80mg daily, monitor lytes    #DM: with hyperglycemia  -Accucheks  -Increase Lantus  8. ...>12 units       #Diarrhea last night,likely due to stool softners. D/c miralax and manda colace. She is afebrile, no nausea, vomiting or abdominal pain. Add probiotics. #. CKD3: monitor renal function, avoid nephrotoxics creatinine improving  -Maintain on lasix 80 mg every day per cards. Watch creatinine,stabe/better thus far. #. HypoNatremia: mild, monitor. Sodium normalized  #. HypoPhosphatemia: replace, monitor  #. Constipation:stopped laxatives due to diarrhea. # Met. Breast cancer: followed by Dr. Fleming . S/p recent Chemo- hold chemo till recovers  #Chronic Anemia: monitor HB, transfuse prn if Hb< 8 due to her NSTEMI   #Left calf pain: - Duplex -ve for DVTs. #. RA: known to Dr Yuliya Montesinos, has R wrist swelling/discomfort. Consult placed, he called and advised will f/u op. #LYDIA: She wears CPAP at home, I have requested the family to bring her CPAP or may have to order from here      Code status: Full code  DVT prophylaxis: scds  Discharge inpatient rehab, pending insurance Auth. Hospital Problems  Date Reviewed: 11/13/2019          Codes Class Noted POA    Septic shock ([facility] [address] ICD-10-CM: A41.9, R65.21  ICD-9-CM: 038.9, 785.52, 995.92  12/25/2019 Unknown                Review of Systems:   A comprehensive review of systems was negative except for that written in the HPI. Vital Signs:    Last 24hrs VS reviewed since prior progress note. Most recent are:  Visit Vitals  /63 (BP 1 Location: Right arm, BP Patient Position: At rest)   Pulse 76   Temp 98.3 °F (36.8 °C)   Resp 17   Ht 5' 5\" (1.651 m)   Wt 62.4 kg (137 lb 9.1 oz)   SpO2 94%   BMI 22.89 kg/m²         Intake/Output Summary (Last 24 hours) at 1/5/2020 1439  Last data filed at 1/4/2020 1926  Gross per 24 hour   Intake    Output 1050 ml   Net -1050 ml        Physical Examination:             Constitutional:  No acute distress, cooperative   ENT:  Oral mucous moist, oropharynx benign. Resp:  Has port on the right chest wall. CTA bilaterally. No wheezing/rhonchi/rales. No accessory muscle use   CV:  Regular rhythm, normal rate, grade III systolic murmur    GI:  Soft, non distended, non tender. normoactive bowel sounds, no hepatosplenomegaly     Musculoskeletal:  SCDs on. No edema, warm, 2+ pulses throughout    Neurologic:  Moves all extremities.   AAOx3, CN II-XII reviewed     Psych:  Good insight, Not anxious nor agitated. Data Review:    Review and/or order of clinical lab test  Review and/or order of tests in the radiology section of CPT  Review and/or order of tests in the medicine section of CPT      Labs:     No results for input(s): WBC, HGB, HCT, PLT, HGBEXT, HCTEXT, PLTEXT, HGBEXT, HCTEXT, PLTEXT in the last 72 hours. Recent Labs     01/05/20  0405 01/04/20  0446 01/03/20  0532   * 138 138   K 3.8 3.9 3.9   CL 99 101 104   CO2 31 30 29   BUN 33* 31* 30*   CREA 1.33* 1.20* 1.14*   * 122* 121*   CA 8.7 8.6 8.1*   MG  --   --  1.9   PHOS  --   --  3.1     No results for input(s): SGOT, GPT, ALT, AP, TBIL, TBILI, TP, ALB, GLOB, GGT, AML, LPSE in the last 72 hours. No lab exists for component: AMYP, HLPSE  No results for input(s): INR, PTP, APTT, INREXT, INREXT in the last 72 hours. No results for input(s): FE, TIBC, PSAT, FERR in the last 72 hours. No results found for: FOL, RBCF   No results for input(s): PH, PCO2, PO2 in the last 72 hours. No results for input(s): CPK, CKNDX, TROIQ in the last 72 hours.     No lab exists for component: CPKMB  Lab Results   Component Value Date/Time    Cholesterol, total 74 04/16/2018 04:21 AM    HDL Cholesterol 25 04/16/2018 04:21 AM    LDL, calculated 31.6 04/16/2018 04:21 AM    Triglyceride 87 04/16/2018 04:21 AM    CHOL/HDL Ratio 3.0 04/16/2018 04:21 AM     Lab Results   Component Value Date/Time    Glucose (POC) 207 (H) 01/05/2020 11:36 AM    Glucose (POC) 146 (H) 01/05/2020 06:42 AM    Glucose (POC) 241 (H) 01/04/2020 09:20 PM    Glucose (POC) 155 (H) 01/04/2020 04:28 PM    Glucose (POC) 202 (H) 01/04/2020 11:21 AM     Lab Results   Component Value Date/Time    Color YELLOW/STRAW 12/25/2019 01:38 PM    Appearance CLOUDY (A) 12/25/2019 01:38 PM    Specific gravity 1.013 12/25/2019 01:38 PM    pH (UA) 5.5 12/25/2019 01:38 PM    Protein 100 (A) 12/25/2019 01:38 PM    Glucose NEGATIVE  12/25/2019 01:38 PM    Ketone NEGATIVE  12/25/2019 01:38 PM    Bilirubin NEGATIVE  12/25/2019 01:38 PM    Urobilinogen 0.2 12/25/2019 01:38 PM    Nitrites POSITIVE (A) 12/25/2019 01:38 PM    Leukocyte Esterase MODERATE (A) 12/25/2019 01:38 PM    Epithelial cells FEW 12/25/2019 01:38 PM    Bacteria 4+ (A) 12/25/2019 01:38 PM    WBC >100 (H) 12/25/2019 01:38 PM    RBC 0-5 12/25/2019 01:38 PM         Medications Reviewed:     Current Facility-Administered Medications   Medication Dose Route Frequency    lactobac ac& pc-s.therm-b.anim (BERYL Q/RISAQUAD)  1 Cap Oral DAILY    insulin glargine (LANTUS) injection 12 Units  12 Units SubCUTAneous QHS    [Held by provider] polyethylene glycol (MIRALAX) packet 17 g  17 g Oral BID    furosemide (LASIX) tablet 80 mg  80 mg Oral DAILY    carvedilol (COREG) tablet 6.25 mg  6.25 mg Oral BID WITH MEALS    alteplase (CATHFLO) 1 mg in sterile water (preservative free) 1 mL injection  1 mg InterCATHeter PRN    bacitracin 500 unit/gram packet 1 Packet  1 Packet Topical PRN    heparin (porcine) pf 500 Units  500 Units InterCATHeter PRN    acetaminophen (TYLENOL) tablet 1,000 mg  1,000 mg Oral Q6H PRN    cefTRIAXone (ROCEPHIN) 1 g in 0.9% sodium chloride (MBP/ADV) 50 mL  1 g IntraVENous Q24H    glycerin (adult) suppository 1 Suppository  1 Suppository Rectal Q12H PRN    evolocumab (REPATHA SURECLICK) pen injection 540 mg (Patient Supplied)  1 mL SubCUTAneous Q 14 DAYS    magic mouthwash cpd (without sucralfate)  5 mL Oral TIDAC    [Held by provider] senna (SENOKOT) tablet 17.2 mg  2 Tab Oral DAILY    oxyCODONE IR (ROXICODONE) tablet 2.5 mg  2.5 mg Oral Q4H PRN    simethicone (MYLICON) tablet 80 mg  80 mg Oral QID PRN    sodium chloride (NS) flush 5-10 mL  5-10 mL IntraVENous PRN    sodium chloride (NS) flush 5-40 mL  5-40 mL IntraVENous Q8H    sodium chloride (NS) flush 5-40 mL  5-40 mL IntraVENous PRN    clopidogrel (PLAVIX) tablet 75 mg  75 mg Oral DAILY    aspirin chewable tablet 81 mg  81 mg Oral DAILY    levothyroxine (SYNTHROID) tablet 88 mcg  88 mcg Oral 6am    glucose chewable tablet 16 g  4 Tab Oral PRN    glucagon (GLUCAGEN) injection 1 mg  1 mg IntraMUSCular PRN    dextrose 10% infusion 0-250 mL  0-250 mL IntraVENous PRN    insulin lispro (HUMALOG) injection   SubCUTAneous AC&HS     ______________________________________________________________________  EXPECTED LENGTH OF STAY: - - -  ACTUAL LENGTH OF STAY:          11                 Ya Toro MD

## 2020-01-05 NOTE — PROGRESS NOTES
Patient said she felt lousy during the shift, earlier, she got up several times to the bathroom to move her bowels. She complained that the miralax that was given to her earlier this evening makes her feel sick. She is currently in bed, with her home cpap machine on, and sleeping, with her SCDs on. I gave her a prn dose of simethicone which seemed to help. She is currently sleeping.

## 2020-01-05 NOTE — ROUTINE PROCESS
Bedside shift change report given to Adryan (oncoming nurse) by Jamelle Cogan (offgoing nurse). Report included the following information SBAR, Kardex, Intake/Output, MAR and Recent Results.

## 2020-01-05 NOTE — PROGRESS NOTES
Problem: Pressure Injury - Risk of  Goal: *Prevention of pressure injury  Description  Document Calderon Scale and appropriate interventions in the flowsheet. Outcome: Progressing Towards Goal  Note: Pressure Injury Interventions:       Moisture Interventions: Absorbent underpads    Activity Interventions: Increase time out of bed    Mobility Interventions: HOB 30 degrees or less, Pressure redistribution bed/mattress (bed type)    Nutrition Interventions: Document food/fluid/supplement intake    Friction and Shear Interventions: Apply protective barrier, creams and emollients                Problem: Patient Education: Go to Patient Education Activity  Goal: Patient/Family Education  Outcome: Progressing Towards Goal     Problem: Falls - Risk of  Goal: *Absence of Falls  Description  Document Yaron Fall Risk and appropriate interventions in the flowsheet.   Outcome: Progressing Towards Goal  Note: Fall Risk Interventions:  Mobility Interventions: Communicate number of staff needed for ambulation/transfer         Medication Interventions: Patient to call before getting OOB    Elimination Interventions: Call light in reach              Problem: Patient Education: Go to Patient Education Activity  Goal: Patient/Family Education  Outcome: Progressing Towards Goal

## 2020-01-05 NOTE — PROGRESS NOTES
Hematology-Oncology Progress Note    Zara Peñaloza  1935  [id number]  1/5/2020    Follow-up for: met. Breast cancer     [x]        Chart notes since last visit reviewed   [x]        Medications reviewed for allergies and interactions       Case discussed with the following:         []                            []        Nursing Staff                                                                         []        Pathologist                                                                        [x]        FAMILY      Subjective:     Spoke with patient who complains of: diarrhea after using laxative last night, tired, no c/o pain  Objective:     Patient Vitals for the past 24 hrs:   BP Temp Pulse Resp SpO2 Weight   01/05/20 0936 115/63 98.3 °F (36.8 °C) 76 17 94 %    01/05/20 0739 134/64  80      01/05/20 0217      62.4 kg (137 lb 9.1 oz)   01/05/20 0216 156/81 98 °F (36.7 °C) 72 16 94 %    01/04/20 2043 100/59 98.2 °F (36.8 °C) 72 16 99 %    01/04/20 1415 134/62 98.5 °F (36.9 °C) 75 16 97 %        REVIEW OF SYSTEMS:    Constitutional: negative fever, negative chills, negative weight loss  Eyes:   negative visual changes  ENT:   negative sore throat, tongue or lip swelling  Respiratory:  negative cough, negative dyspnea  Cards:  negative for chest pain, palpitations, lower extremity edema  GI:   negative for nausea, vomiting, diarrhea, and abdominal pain  Neuro:  negative for headaches, dizziness, vertigo  []                        Full ROS o/w normal/non contributor    Constitutional:  Patient looks  [x]        Sick  [x]        Frail  []        Better                                                 []        Depressed    HEENT:  [x]   NC                         []   AT               []    ALOPECIA     High flow oxygen in place      Eyes: [x]   Normal               []    Icteric  Oropharynx: []    Normal                  []  Thrush               []   Dry  Lungs. .. crackles in bases  Abd. Soft non tend  Mucositis: [x]    None         Neck:   [x]   Supple                  []  Rigid               Ext. No cce  [x]        Normal  []        Confused      Available labs reviewed:  Labs:    Recent Results (from the past 24 hour(s))   GLUCOSE, POC    Collection Time: 01/04/20  4:28 PM   Result Value Ref Range    Glucose (POC) 155 (H) 65 - 100 mg/dL    Performed by Micha Prajapati    GLUCOSE, POC    Collection Time: 01/04/20  9:20 PM   Result Value Ref Range    Glucose (POC) 241 (H) 65 - 100 mg/dL    Performed by BECCA MACEDO    METABOLIC PANEL, BASIC    Collection Time: 01/05/20  4:05 AM   Result Value Ref Range    Sodium 135 (L) 136 - 145 mmol/L    Potassium 3.8 3.5 - 5.1 mmol/L    Chloride 99 97 - 108 mmol/L    CO2 31 21 - 32 mmol/L    Anion gap 5 5 - 15 mmol/L    Glucose 139 (H) 65 - 100 mg/dL    BUN 33 (H) 6 - 20 MG/DL    Creatinine 1.33 (H) 0.55 - 1.02 MG/DL    BUN/Creatinine ratio 25 (H) 12 - 20      GFR est AA 46 (L) >60 ml/min/1.73m2    GFR est non-AA 38 (L) >60 ml/min/1.73m2    Calcium 8.7 8.5 - 10.1 MG/DL   GLUCOSE, POC    Collection Time: 01/05/20  6:42 AM   Result Value Ref Range    Glucose (POC) 146 (H) 65 - 100 mg/dL    Performed by Lora Epstein, POC    Collection Time: 01/05/20 11:36 AM   Result Value Ref Range    Glucose (POC) 207 (H) 65 - 100 mg/dL    Performed by Micha Prajapati        Available Xrays reviewed:    Chemotherapy monitored and toxicities assessed:    Assessment and Plan   1. Met. Breast cancer. .. pt. Has had nice response to chemo (abraxane/xeloda) with recent documented decrease in hepatic and axillary involvement. .. her last rx was 12/16 (cycle 5 ) she received 7 days of xeloda ending on 12/22. Anastasiia Po than neulasta on 12/23. .. she is \"due\" this week but this will be held until she is discharged from rehab    2. Leukocytosis secondary to neulasta and sepsis. Anastasiia Po improving resolved    3. Anemia. Anastasiia Po secondary to chemotherapy/ckd,, hgb 9 on 1/1, will repeat cbc tomorrow    4.  Gram negative sepsis 4/4 bttls +. [facility] ID - E. Coli - pan sensitive. ..will take cipro until 1/7 per Dr. Catrachito Schafer    5. Chest pain / sob. .. NSTEMI - Medical Management, EF - low but unchanged from June 2019    Appreciate everyone's help with her care.       Dawna Pelayo MD    .

## 2020-01-06 NOTE — TELEPHONE ENCOUNTER
She may receive her infusion if she has recuperated from her illness and is no longer on antibiotics.

## 2020-01-06 NOTE — DISCHARGE SUMMARY
Discharge Summary         PATIENT ID: Alison Patel  MRN: [id number]   YOB: 1935    DATE OF ADMISSION: 12/25/2019 12:22 PM    DATE OF DISCHARGE: 1/6/2020    PRIMARY CARE PROVIDER: Torsten Bhat MD       ATTENDING PHYSICIAN: Alise Aguilar MD  DISCHARGING PROVIDER: Juany Burch MD     To contact this individual call [phone] and ask the  to page. If unavailable ask to be transferred the Adult Hospitalist Department. CONSULTATIONS: IP CONSULT TO INTENSIVIST  IP CONSULT TO INFECTIOUS DISEASES  IP CONSULT TO HEMATOLOGY  IP CONSULT TO RHEUMATOLOGY  IP CONSULT TO CARDIOLOGY    PROCEDURES/SURGERIES: * No surgery found *    ADMITTING [address] [place] [address] COURSE:     This 82-year-old female with a past medical history of metastatic breast cancer on chemotherapy systolic heart failure insulin-dependent diabetes stroke rheumatoid arthritis frequent UTIs presented to the emergency room ON 12/25 for fever and lethargy. She was found to be in septic shock and was started on broad-spectrum antibiotic antibiotics with vancomycin and cefepime awaiting cultures and and vasopressors so she was admitted to the ICU. She was seen by intensivist while she is in the ICU. Infectious disease, hematology and cardiology were involved and closely followed patient all along. E. coli bacteremia and bacteriuria with septic shock, she initially required vasopressors. She has been off of vasopressors since 12/26/2019. She receiving IV ceftriaxone and infectious disease was following and recommended to complete treatment which is due to and on 1/7/2020. NSTEMI, cardiology were on board and medical management is recommended  Acute on chronic systolic CHF: NYHA 4. NYHA class II on discharge. Ef 30-35%. -She is on  aspirin,plavix,and coreg. She is allergic to statins. not on ACE-I/ARB due to ckd/unstable GFR. Acute hypoxic respiratory failure due to CHF exacerbation: - resolved.  On room air       DM: with hyperglycemia  -Accucheks before meals and at bedtime with high-sensitivity Humalog sliding scale. Resume long-acting insulin             CKD3: monitor renal function, avoid nephrotoxics. -Maintain on lasix 80 mg every day per cards  -Closely monitor creatinine      HypoNatremia: mild, monitor. Resolved    HypoPhosphatemia: Resolved    Metastatic breast cancer: followed by Dr. Maximo Goodman. S/p recent Chemo- hold chemo till recovers. The oncologist would like to see patient post rehab    Chronic Anemia: Monitor hemoglobin. Left calf pain, resolved: - Duplex -ve for DVTs. Rheumatoid arthritis, known to the rheumatologist Dr Marybel Cordero who was informed and he advised follow-up as outpatient. LYDIA: Use home CPAP       Diarrhea for 1 day due to stool softeners/laxatives. Resolved with stoppage of stool softeners and laxatives. DISCHARGE DIAGNOSES / PLAN:      1. Septic shock due to E. coli bacteremia and bacteriuria resolved. Last dose of antibiotics on 1/7/2020  2. Acute hypoxic respiratory failure: Resolved. Presently on room air  3. NSTEMI, systolic CHF: Continue medical management. Outpatient cardiology follow-up  4. CKD stage III: Monitor renal function closely now that her diuretics intensified. Check BMP in 3 days and thereafter as needed  5. Acute debility due to medical illnesses: Patient needs rehabilitation  6. Chronic anemia. Hemoglobin stable. Check hemoglobin in a week.        PENDING TEST RESULTS:   At the time of discharge the following test results are still pending: None    FOLLOW UP APPOINTMENTS:    Follow-up Information     Follow up With Specialties Details Why Contact Karen Prakash MD Family Practice In 1 week  [address]  [address] [place] [address]  [phone]        In 3 weeks  Cardiology    Zoë Nugent MD Rheumatology In 2 weeks  [address]  [place] [address] [place] [address]      Cherri Sterling MD Cardiology On 1/7/2020 2:40 pm [address] [place] [address]  [phone]             ADDITIONAL CARE RECOMMENDATIONS:     DIET: Regular Diet, Cardiac Diet, Diabetic Diet and Renal Diet        OXYGEN / BiPAP SETTINGS: On room air. Uses CPAP nightly    ACTIVITY: Activity as tolerated and PT/OT Eval and Treat      EQUIPMENT needed: To be determined after rehab. DISCHARGE MEDICATIONS:   See Medication Reconciliation Form      NOTIFY YOUR PHYSICIAN FOR ANY OF THE FOLLOWING:   Fever over 101 degrees for 24 hours. Chest pain, shortness of breath, fever, chills, nausea, vomiting, diarrhea, change in mentation, falling, weakness, bleeding. Severe pain or pain not relieved by medications. Or, any other signs or symptoms that you may have questions about. DISPOSITION:    Home With:   OT  PT  HH  RN      x SNF/Inpatient Rehab/LTAC    Independent/assisted living    Hospice    Other:       PATIENT CONDITION AT DISCHARGE:     Functional status    Poor    x Deconditioned     Independent      Cognition    x Lucid     Forgetful     Dementia      Catheters/lines (plus indication)    Alford     PICC     PEG    x None      Code status   x  Full code     DNR      PHYSICAL EXAMINATION AT DISCHARGE:                                      Constitutional:  Pleasant elderly, alert oriented x4, no distress. ENT:  Oral mucous moist, oropharynx benign. Resp:  Has port on the right chest wall. CTA bilaterally. No wheezing/rhonchi/rales. No accessory muscle use   CV:  Regular rhythm, normal rate, grade III systolic murmur    GI:  Soft, non distended, non tender. normoactive bowel sounds, no hepatosplenomegaly     Musculoskeletal:  SCDs on. No edema, warm, 2+ pulses throughout    Neurologic:  Moves all extremities. AAOx3, CN II-XII reviewed                         Psych:  Good insight, Not anxious nor agitated.             CHRONIC MEDICAL DIAGNOSES:  Problem List as of 1/6/2020 Date Reviewed: 11/13/2019          Codes Class Noted - Resolved    Septic shock ([facility] ICD-10-CM: A41.9, R65.21  ICD-9-CM: 038.9, 785.52, 995.92  12/25/2019 - Present        Dizziness ICD-10-CM: R42  ICD-9-CM: 780.4  8/25/2019 - Present        Type 2 diabetes with nephropathy ([facility] ICD-10-CM: E11.21  ICD-9-CM: 250.40, 583.81  8/20/2018 - Present        CAD (coronary artery disease) ICD-10-CM: I25.10  ICD-9-CM: 414.00  6/21/2018 - Present        Metastatic breast cancer ([facility] ICD-10-CM: C50.919  ICD-9-CM: 174.9  6/1/2018 - Present        Acute on chronic systolic HF (heart failure) ([facility] ICD-10-CM: S52.05  ICD-9-CM: 428.23  5/19/2018 - Present        CKD (chronic kidney disease) stage 3, GFR 30-59 ml/min ([facility]) ICD-10-CM: N18.3  ICD-9-CM: 585.3  10/25/2017 - Present        Vitamin D deficiency ICD-10-CM: E55.9  ICD-9-CM: 268.9  6/16/2017 - Present        Asymptomatic hyperuricemia ICD-10-CM: E79.0  ICD-9-CM: 790.6  2/16/2017 - Present        Statin intolerance ICD-10-CM: Z78.9  ICD-9-CM: 995.27  12/21/2016 - Present        Long-term use of immunosuppressant medication ICD-10-CM: Z79.899  ICD-9-CM: V58.69  11/21/2016 - Present        Seropositive rheumatoid arthritis of multiple sites [facility]) ICD-10-CM: M05.79  ICD-9-CM: 714.0  9/28/2016 - Present        Primary osteoarthritis of both knees ICD-10-CM: M17.0  ICD-9-CM: 715.16  9/28/2016 - Present        Weakness due to cerebrovascular accident ICD-10-CM: [id number]  ICD-9-CM: Marcia Whitaker  4/2/2012 - Present        PAD (peripheral artery disease) ([facility] ICD-10-CM: I73.9  ICD-9-CM: 443.9  9/7/2011 - Present        Carotid bruit ICD-10-CM: R09.89  ICD-9-CM: 785.9  8/31/2011 - Present        Hyperlipidemia ICD-10-CM: E78.5  ICD-9-CM: 272.4  1/27/2010 - Present        Type 2 diabetes mellitus with neurologic complication, with long-term current use of insulin (HCC) ICD-10-CM: E11.49, Z79.4  ICD-9-CM: 250.60, V58.67  9/2/2009 - Present        Colon cancer ([facility] ICD-10-CM: C18.9  ICD-9-CM: 153.9  9/2/2009 - Present        Age-related osteoporosis without current pathological fracture ICD-10-CM: M81.0  ICD-9-CM: 733.01  9/2/2009 - Present        HTN, goal below 140/90 ICD-10-CM: I10  ICD-9-CM: 401.9  9/2/2009 - Present        Anemia ICD-10-CM: D64.9  ICD-9-CM: 285.9  9/2/2009 - Present        RESOLVED: Acute respiratory failure ([facility] ICD-10-CM: J96.00  ICD-9-CM: 518.81  6/21/2018 - 8/8/2019        RESOLVED: Elevated troponin ICD-10-CM: R79.89  ICD-9-CM: 790.6  6/21/2018 - 8/8/2019        RESOLVED: Acute renal failure superimposed on stage 3 chronic kidney disease ([facility] ICD-10-CM: N17.9, N18.3  ICD-9-CM: 584.9, 585.3  6/21/2018 - 2/18/2019        RESOLVED: Gout flare ICD-10-CM: M10.9  ICD-9-CM: 274.01  6/21/2018 - 8/8/2019        RESOLVED: Hyperglycemia due to type 2 diabetes mellitus ([facility] ICD-10-CM: E11.65  ICD-9-CM: 250.00  6/21/2018 - 8/8/2019        RESOLVED: Fluid overload ICD-10-CM: E87.70  ICD-9-CM: 276.69  6/1/2018 - 6/18/2018        RESOLVED: Goals of care, counseling/discussion ICD-10-CM: Z71.89  ICD-9-CM: V65.49  6/1/2018 - 8/8/2019        RESOLVED: Acute systolic heart failure ([facility] ICD-10-CM: I50.21  ICD-9-CM: 428.21  4/24/2018 - 8/8/2019        RESOLVED: Altered mental status, unspecified ICD-10-CM: R41.82  ICD-9-CM: 780.97  4/23/2018 - 8/8/2019        RESOLVED: SOB (shortness of breath) ICD-10-CM: R06.02  ICD-9-CM: 786.05  4/16/2018 - 7/4/2018        RESOLVED: Occlusion and stenosis of carotid artery without mention of cerebral infarction ICD-10-CM: I65.29  ICD-9-CM: 433.10  8/23/2013 - 8/8/2019        RESOLVED: Neuropathy in diabetes ([facility] ICD-10-CM: E11.40  ICD-9-CM: 250.60, 357.2  4/2/2012 - 8/8/2019        RESOLVED: Claudication ([facility] ICD-10-CM: I73.9  ICD-9-CM: 443.9  8/31/2011 - 8/8/2019        RESOLVED: Weakness of left arm ICD-10-CM: R29.898  ICD-9-CM: 729.89  8/31/2011 - 8/8/2019        RESOLVED: Hypothyroid ICD-10-CM: E03.9  ICD-9-CM: 244.9  9/2/2009 - 8/8/2019        RESOLVED: H/O: CVA ICD-9-CM: V12.54  9/2/2009 - 8/8/2019        RESOLVED: Microalbuminuria ICD-10-CM: R80.9  ICD-9-CM: 791.0  9/2/2009 - 8/8/2019                  DISCHARGE MEDICATIONS:  Current Discharge Medication List      START taking these medications    Details   L. acidoph & paracasei- S therm- Bifido (BERYL-Q/RISAQUAD) 8 billion cell cap cap Take 1 Cap by mouth daily for 30 days. Qty: 30 Cap, Refills: 0      levoFLOXacin (LEVAQUIN) 500 mg tablet Take 1 Tab by mouth daily for 1 day. Qty: 1 Tab, Refills: 0         CONTINUE these medications which have CHANGED    Details   furosemide (LASIX) 40 mg tablet Take 2 Tabs by mouth daily. Qty: 60 Tab, Refills: 0    Associated Diagnoses: Essential hypertension; Acute systolic heart failure (HCC)      carvedilol (COREG) 6.25 mg tablet Take 1 Tab by mouth two (2) times daily (with meals) for 30 days. Qty: 60 Tab, Refills: 0         CONTINUE these medications which have NOT CHANGED    Details   capecitabine (XELODA) 500 mg tablet         PACLitaxel-Protein Bound (ABRAXANE) 100 mg chemo injection by IntraVENous route. rituximab (RITUXAN IV) 1,000 mg by IntraVENous route every 6 months. clopidogrel (PLAVIX) 75 mg tab TAKE ONE TABLET BY MOUTH DAILY  Qty: 30 Tab, Refills: 11    Associated Diagnoses: Coronary artery disease involving native coronary artery, angina presence unspecified, unspecified whether native or transplanted heart      insulin aspart U-100 (NOVOLOG U-100 INSULIN ASPART) 100 unit/mL injection by SubCUTAneous route. Sliding scale   Indications: usually needs 2 to 3 units at dinner      insulin degludec (TRESIBA FLEXTOUCH U-100) 100 unit/mL (3 mL) inpn 14 Units by SubCUTAneous route daily. denosumab (XGEVA SC) by SubCUTAneous route every thirty (30) days. epoetin celio (PROCRIT) 10,000 unit/mL injection by SubCUTAneous route once.  Unsure of dosage      REPATHA SURECLICK pen injection INJECT 1 ML SUBCUTANEOUS EVERY 14 DAYS  Qty: 2 Pen, Refills: 11    Associated Diagnoses: Mixed hyperlipidemia      acetaminophen (TYLENOL) 325 mg tablet Take 2 Tabs by mouth every four (4) hours as needed. Qty: 10 Tab, Refills: 0      b-complex with vitamin c tablet Take 1 Tab by mouth daily. Qty: 30 Tab, Refills: 0      polyethylene glycol (MIRALAX) 17 gram packet Take 1 Packet by mouth daily. Qty: 1 Each, Refills: 0      levothyroxine (SYNTHROID) 88 mcg tablet Take 88 mcg by mouth Daily (before breakfast). aspirin delayed-release 81 mg tablet Take 81 mg by mouth daily. OMEGA-3 FATTY ACIDS/FISH OIL (OMEGA 3 FISH OIL PO) Take 300 mg by mouth daily. CHOLECALCIFEROL, VITAMIN D3, (VITAMIN D-3 PO) Take 1,000 Units by mouth daily. nitroglycerin (NITROSTAT) 0.4 mg SL tablet 1 Tab by SubLINGual route as needed for Chest Pain. Up to 3 doses. Qty: 40 Tab, Refills: 0           Greater than 30 minutes were spent with the patient on counseling and coordination of care    Signed:    Bishop Juan MD  1/6/2020  2:19 PM

## 2020-01-06 NOTE — PROGRESS NOTES
Hematology-Oncology Progress Note    Chadd Hopkins  1935  [id number]  1/6/2020    Follow-up for: met. Breast cancer     [x]        Chart notes since last visit reviewed   [x]        Medications reviewed for allergies and interactions       Case discussed with the following:         []                            []        Nursing Staff                                                                         []        Pathologist                                                                        [x]        FAMILY      Subjective:     Spoke with patient who complains of: had a good night, looking forward to going to rehab  Objective:     Patient Vitals for the past 24 hrs:   BP Temp Pulse Resp SpO2 Weight   01/06/20 0815 112/55 98.8 °F (37.1 °C) 73 16 97 %    01/06/20 0626      61.4 kg (135 lb 5.8 oz)   01/06/20 0620 121/73  78      01/06/20 0500 118/54 98.3 °F (36.8 °C) 77 16 98 %    01/05/20 2128 130/80 98.8 °F (37.1 °C) 79 18 97 %    01/05/20 1532 130/80 98 °F (36.7 °C) 80 18 100 %        REVIEW OF SYSTEMS:    Constitutional: negative fever, negative chills, negative weight loss  Eyes:   negative visual changes  ENT:   negative sore throat, tongue or lip swelling  Respiratory:  negative cough, negative dyspnea  Cards:  negative for chest pain, palpitations, lower extremity edema  GI:   negative for nausea, vomiting, diarrhea, and abdominal pain  Neuro:  negative for headaches, dizziness, vertigo  []                        Full ROS o/w normal/non contributor    Constitutional:  Patient looks  []        Sick  []        Frail  [x]        Better                                                 []        Depressed    HEENT:  [x]   NC                         []   AT               []    ALOPECIA     High flow oxygen in place      Eyes: [x]   Normal               []    Icteric  Oropharynx: []    Normal                  []  Thrush               []   Dry  Lungs. .. crackles in bases  Abd.  Soft non tend  Mucositis: [x]    None         Neck:   [x]   Supple                  []  Rigid               Ext. No cce  [x]        Normal  []        Confused      Available labs reviewed:  Labs:    Recent Results (from the past 24 hour(s))   GLUCOSE, POC    Collection Time: 01/05/20 11:36 AM   Result Value Ref Range    Glucose (POC) 207 (H) 65 - 100 mg/dL    Performed by Abi Serious Parody    GLUCOSE, POC    Collection Time: 01/05/20  4:55 PM   Result Value Ref Range    Glucose (POC) 175 (H) 65 - 100 mg/dL    Performed by Abi Serious Parody    GLUCOSE, POC    Collection Time: 01/05/20  9:23 PM   Result Value Ref Range    Glucose (POC) 204 (H) 65 - 100 mg/dL    Performed by Janet Babb    METABOLIC PANEL, BASIC    Collection Time: 01/06/20  3:06 AM   Result Value Ref Range    Sodium 138 136 - 145 mmol/L    Potassium 3.6 3.5 - 5.1 mmol/L    Chloride 102 97 - 108 mmol/L    CO2 30 21 - 32 mmol/L    Anion gap 6 5 - 15 mmol/L    Glucose 119 (H) 65 - 100 mg/dL    BUN 33 (H) 6 - 20 MG/DL    Creatinine 1.44 (H) 0.55 - 1.02 MG/DL    BUN/Creatinine ratio 23 (H) 12 - 20      GFR est AA 42 (L) >60 ml/min/1.73m2    GFR est non-AA 35 (L) >60 ml/min/1.73m2    Calcium 8.5 8.5 - 10.1 MG/DL   CBC W/O DIFF    Collection Time: 01/06/20  3:06 AM   Result Value Ref Range    WBC 6.4 3.6 - 11.0 K/uL    RBC 2.88 (L) 3.80 - 5.20 M/uL    HGB 8.9 (L) 11.5 - 16.0 g/dL    HCT 29.5 (L) 35.0 - 47.0 %    .4 (H) 80.0 - 99.0 FL    MCH 30.9 26.0 - 34.0 PG    MCHC 30.2 30.0 - 36.5 g/dL    RDW 17.7 (H) 11.5 - 14.5 %    PLATELET 308 896 - 112 K/uL    MPV 10.4 8.9 - 12.9 FL    NRBC 0.0 0  WBC    ABSOLUTE NRBC 0.00 0.00 - 0.01 K/uL   GLUCOSE, POC    Collection Time: 01/06/20  6:14 AM   Result Value Ref Range    Glucose (POC) 105 (H) 65 - 100 mg/dL    Performed by Janet Babb        Available Xrays reviewed:    Chemotherapy monitored and toxicities assessed:    Assessment and Plan   1. Met. Breast cancer. .. pt. Has had nice response to chemo (abraxane/xeloda) with recent documented decrease in hepatic and axillary involvement. .. her last rx was 12/16 (cycle 5 ) she received 7 days of xeloda ending on 12/22. Mabeline Sink than neulasta on 12/23. .. she is \"due\" this week but this will be held until she is discharged from rehab    2. Leukocytosis resolved  3. Anemia. Mabeline Sink secondary to chemotherapy/ckd,, hgb 8.9 on 1/6    4. Gram negative sepsis 4/4 bttls +. Mabeline Sink Mabeline Sink ID - E. Coli - pan sensitive. ..will take cipro until 1/7 per Dr. Lulú Ponce    5. Chest pain / sob. .. NSTEMI - Medical Management, EF - low but unchanged from June 2019    Disposition. . I will see her after she gets out of rehab      Idalia Frey MD    .

## 2020-01-06 NOTE — PROGRESS NOTES
Bedside and Verbal shift change report given to [address] [place] [address], [address], RN (oncoming nurse) by Rayray Ewing RN (offgoing nurse). Report included the following information SBAR, Kardex, Intake/Output, MAR and Recent Results.

## 2020-01-06 NOTE — DISCHARGE INSTRUCTIONS
Discharge SNF/Rehab Instructions/LTAC       PATIENT ID: Alison Patel  MRN: [id number]   YOB: 1935    DATE OF ADMISSION: 12/25/2019 12:22 PM    DATE OF DISCHARGE: 1/6/2020    PRIMARY CARE PROVIDER: Torsten Bhat MD       ATTENDING PHYSICIAN: Alise Aguilar MD  DISCHARGING PROVIDER: Juany Burch MD     To contact this individual call [phone] and ask the  to page. If unavailable ask to be transferred the Adult Hospitalist Department. CONSULTATIONS: IP CONSULT TO INTENSIVIST  IP CONSULT TO INFECTIOUS DISEASES  IP CONSULT TO HEMATOLOGY  IP CONSULT TO RHEUMATOLOGY  IP CONSULT TO CARDIOLOGY    PROCEDURES/SURGERIES: * No surgery found *    ADMITTING [address] [place] [address] COURSE:     This 82-year-old female with a past medical history of metastatic breast cancer on chemotherapy systolic heart failure insulin-dependent diabetes stroke rheumatoid arthritis frequent UTIs presented to the emergency room ON 12/25 for fever and lethargy. She was found to be in septic shock and was started on broad-spectrum antibiotic antibiotics with vancomycin and cefepime awaiting cultures and and vasopressors so she was admitted to the ICU. She was seen by intensivist while she is in the ICU. Infectious disease, hematology and cardiology were involved and closely followed patient all along. E. coli bacteremia and bacteriuria with septic shock, she initially required vasopressors. She has been off of vasopressors since 12/26/2019. She receiving IV ceftriaxone and infectious disease was following and recommended to complete treatment which is due to and on 1/7/2020. NSTEMI, cardiology were on board and medical management is recommended  Acute on chronic systolic CHF: NYHA 4. NYHA class II on discharge. Ef 30-35%. -She is on  aspirin,plavix,and coreg. She is allergic to statins. not on ACE-I/ARB due to ckd/unstable GFR.         Acute hypoxic respiratory failure due to CHF exacerbation: - resolved. On room air       DM: with hyperglycemia  -Accucheks before meals and at bedtime with high-sensitivity Humalog sliding scale. Resume long-acting insulin             CKD3: monitor renal function, avoid nephrotoxics. -Maintain on lasix 80 mg every day per cards  -Closely monitor creatinine      HypoNatremia: mild, monitor. Resolved    HypoPhosphatemia: Resolved    Metastatic breast cancer: followed by Dr. Cortes Oro. S/p recent Chemo- hold chemo till recovers. The oncologist would like to see patient post rehab    Chronic Anemia: Monitor hemoglobin. Left calf pain, resolved: - Duplex -ve for DVTs. Rheumatoid arthritis, known to the rheumatologist Dr Shar Sharp who was informed and he advised follow-up as outpatient. LYDIA: Use home CPAP       Diarrhea for 1 day due to stool softeners/laxatives. Resolved with stoppage of stool softeners and laxatives. DISCHARGE DIAGNOSES / PLAN:      1. Septic shock due to E. coli bacteremia and bacteriuria resolved. Last dose of antibiotics on 1/7/2020  2. Acute hypoxic respiratory failure: Resolved. Presently on room air  3. NSTEMI, systolic CHF: Continue medical management. Outpatient cardiology follow-up  4. CKD stage III: Monitor renal function closely now that her diuretics intensified. Check BMP in 3 days and thereafter as needed  5. Acute debility due to medical illnesses: Patient needs rehabilitation  6. Chronic anemia. Hemoglobin stable. Check hemoglobin in a week.        PENDING TEST RESULTS:   At the time of discharge the following test results are still pending: None    FOLLOW UP APPOINTMENTS:    Follow-up Information     Follow up With Specialties Details Why Contact Aleisha Huitron MD Family Practice In 1 week  [address]  [facility] 13  [phone]        In 3 weeks  Cardiology    Javed Fajardo MD Rheumatology In 2 weeks  [address] [facility]      Katie Marcelino MD Cardiology On 1/7/2020 2:40 pm [address] [facility] [address] [address]  [phone]             ADDITIONAL CARE RECOMMENDATIONS:     DIET: Regular Diet, Cardiac Diet, Diabetic Diet and Renal Diet        OXYGEN / BiPAP SETTINGS: On room air. Uses CPAP nightly    ACTIVITY: Activity as tolerated and PT/OT Eval and Treat      EQUIPMENT needed: To be determined after rehab. DISCHARGE MEDICATIONS:   See Medication Reconciliation Form      NOTIFY YOUR PHYSICIAN FOR ANY OF THE FOLLOWING:   Fever over 101 degrees for 24 hours. Chest pain, shortness of breath, fever, chills, nausea, vomiting, diarrhea, change in mentation, falling, weakness, bleeding. Severe pain or pain not relieved by medications. Or, any other signs or symptoms that you may have questions about. DISPOSITION:    Home With:   OT  PT  HH  RN      x SNF/Inpatient Rehab/LTAC    Independent/assisted living    Hospice    Other:       PATIENT CONDITION AT DISCHARGE:     Functional status    Poor    x Deconditioned     Independent      Cognition    x Lucid     Forgetful     Dementia      Catheters/lines (plus indication)    Alford     PICC     PEG    x None      Code status   x  Full code     DNR      PHYSICAL EXAMINATION AT DISCHARGE:                                      Constitutional:  Pleasant elderly, alert oriented x4, no distress. ENT:  Oral mucous moist, oropharynx benign. Resp:  Has port on the right chest wall. CTA bilaterally. No wheezing/rhonchi/rales. No accessory muscle use   CV:  Regular rhythm, normal rate, grade III systolic murmur    GI:  Soft, non distended, non tender. normoactive bowel sounds, no hepatosplenomegaly     Musculoskeletal:  SCDs on. No edema, warm, 2+ pulses throughout    Neurologic:  Moves all extremities. AAOx3, CN II-XII reviewed                         Psych:  Good insight, Not anxious nor agitated.             CHRONIC MEDICAL DIAGNOSES:  Problem List as of 1/6/2020 Date Reviewed: 11/13/2019 Codes Class Noted - Resolved    Septic shock ([facility] ICD-10-CM: A41.9, R65.21  ICD-9-CM: 038.9, 785.52, 995.92  12/25/2019 - Present        Dizziness ICD-10-CM: R42  ICD-9-CM: 780.4  8/25/2019 - Present        Type 2 diabetes with nephropathy ([facility] ICD-10-CM: E11.21  ICD-9-CM: 250.40, 583.81  8/20/2018 - Present        CAD (coronary artery disease) ICD-10-CM: I25.10  ICD-9-CM: 414.00  6/21/2018 - Present        Metastatic breast cancer ([facility] ICD-10-CM: C50.919  ICD-9-CM: 174.9  6/1/2018 - Present        Acute on chronic systolic HF (heart failure) ([facility] ICD-10-CM: Q93.88  ICD-9-CM: 428.23  5/19/2018 - Present        CKD (chronic kidney disease) stage 3, GFR 30-59 ml/min ([facility]) ICD-10-CM: N18.3  ICD-9-CM: 585.3  10/25/2017 - Present        Vitamin D deficiency ICD-10-CM: E55.9  ICD-9-CM: 268.9  6/16/2017 - Present        Asymptomatic hyperuricemia ICD-10-CM: E79.0  ICD-9-CM: 790.6  2/16/2017 - Present        Statin intolerance ICD-10-CM: Z78.9  ICD-9-CM: 995.27  12/21/2016 - Present        Long-term use of immunosuppressant medication ICD-10-CM: Z79.899  ICD-9-CM: V58.69  11/21/2016 - Present        Seropositive rheumatoid arthritis of multiple sites [facility]) ICD-10-CM: M05.79  ICD-9-CM: 714.0  9/28/2016 - Present        Primary osteoarthritis of both knees ICD-10-CM: M17.0  ICD-9-CM: 715.16  9/28/2016 - Present        Weakness due to cerebrovascular accident ICD-10-CM: [id number]  ICD-9-CM: Matt Gastelum  4/2/2012 - Present        PAD (peripheral artery disease) ([facility] ICD-10-CM: I73.9  ICD-9-CM: 443.9  9/7/2011 - Present        Carotid bruit ICD-10-CM: R09.89  ICD-9-CM: 785.9  8/31/2011 - Present        Hyperlipidemia ICD-10-CM: E78.5  ICD-9-CM: 272.4  1/27/2010 - Present        Type 2 diabetes mellitus with neurologic complication, with long-term current use of insulin ([facility]) ICD-10-CM: E11.49, Z79.4  ICD-9-CM: 250.60, V58.67  9/2/2009 - Present        Colon cancer ([facility] ICD-10-CM: C18.9  ICD-9-CM: 153.9  9/2/2009 - Present Age-related osteoporosis without current pathological fracture ICD-10-CM: M81.0  ICD-9-CM: 733.01  9/2/2009 - Present        HTN, goal below 140/90 ICD-10-CM: I10  ICD-9-CM: 401.9  9/2/2009 - Present        Anemia ICD-10-CM: D64.9  ICD-9-CM: 285.9  9/2/2009 - Present        RESOLVED: Acute respiratory failure ([facility] ICD-10-CM: J96.00  ICD-9-CM: 518.81  6/21/2018 - 8/8/2019        RESOLVED: Elevated troponin ICD-10-CM: R79.89  ICD-9-CM: 790.6  6/21/2018 - 8/8/2019        RESOLVED: Acute renal failure superimposed on stage 3 chronic kidney disease ([facility] ICD-10-CM: N17.9, N18.3  ICD-9-CM: 584.9, 585.3  6/21/2018 - 2/18/2019        RESOLVED: Gout flare ICD-10-CM: M10.9  ICD-9-CM: 274.01  6/21/2018 - 8/8/2019        RESOLVED: Hyperglycemia due to type 2 diabetes mellitus ([facility] ICD-10-CM: E11.65  ICD-9-CM: 250.00  6/21/2018 - 8/8/2019        RESOLVED: Fluid overload ICD-10-CM: E87.70  ICD-9-CM: 276.69  6/1/2018 - 6/18/2018        RESOLVED: Goals of care, counseling/discussion ICD-10-CM: Z71.89  ICD-9-CM: V65.49  6/1/2018 - 8/8/2019        RESOLVED: Acute systolic heart failure ([facility] ICD-10-CM: I50.21  ICD-9-CM: 428.21  4/24/2018 - 8/8/2019        RESOLVED: Altered mental status, unspecified ICD-10-CM: R41.82  ICD-9-CM: 780.97  4/23/2018 - 8/8/2019        RESOLVED: SOB (shortness of breath) ICD-10-CM: R06.02  ICD-9-CM: 786.05  4/16/2018 - 7/4/2018        RESOLVED: Occlusion and stenosis of carotid artery without mention of cerebral infarction ICD-10-CM: I65.29  ICD-9-CM: 433.10  8/23/2013 - 8/8/2019        RESOLVED: Neuropathy in diabetes ([facility] ICD-10-CM: E11.40  ICD-9-CM: 250.60, 357.2  4/2/2012 - 8/8/2019        RESOLVED: Claudication ([facility] ICD-10-CM: I73.9  ICD-9-CM: 443.9  8/31/2011 - 8/8/2019        RESOLVED: Weakness of left arm ICD-10-CM: R29.898  ICD-9-CM: 729.89  8/31/2011 - 8/8/2019        RESOLVED: Hypothyroid ICD-10-CM: E03.9  ICD-9-CM: 244.9  9/2/2009 - 8/8/2019        RESOLVED: H/O: CVA ICD-9-CM: V12.54 9/2/2009 - 8/8/2019        RESOLVED: Microalbuminuria ICD-10-CM: R80.9  ICD-9-CM: 791.0  9/2/2009 - 8/8/2019                        Signed:    Elizbeth Dakins, MD  1/6/2020  2:00 PM

## 2020-01-06 NOTE — PROGRESS NOTES
Bedside shift change report given to Andrés Cade (oncoming nurse) by Carlyle Bell RN (offgoing nurse). Report included the following information SBAR, Kardex, MAR and Recent Results.

## 2020-01-06 NOTE — TELEPHONE ENCOUNTER
Spoke with pt's son and let him know that Ms. Mohan Melara will need to hold off on her second dose of Rituxan until she is feeling better and clear of any infections. He stated an understanding.

## 2020-01-06 NOTE — PROGRESS NOTES
Problem: Mobility Impaired (Adult and Pediatric)  Goal: *Acute Goals and Plan of Care (Insert Text)  Description  FUNCTIONAL STATUS PRIOR TO ADMISSION: Patient was modified independent using a rollator for functional mobility. HOME SUPPORT PRIOR TO ADMISSION: The patient lived with family prior to admission and was never home alone. Physical Therapy Goals  Initiated 12/29/2019  1. Patient will move from supine to sit and sit to supine  and roll side to side in bed with supervision/set-up within 7 day(s). 2.  Patient will transfer from bed to chair and chair to bed with minimal assistance/contact guard assist using the least restrictive device within 7 day(s). 3.  Patient will perform sit to stand with minimal assistance/contact guard assist within 7 day(s). 4.  Patient will ambulate with minimal assistance/contact guard assist for 50 feet with the least restrictive device within 7 day(s). 5.  Patient will ascend/descend 5 stairs with 1 handrail(s) with minimal assistance/contact guard assist within 7 day(s). Outcome: Progressing Towards Goal  PHYSICAL THERAPY TREATMENT  Patient: Olayinka Hoover (59 y.o. female)  Date: 1/6/2020  Diagnosis: Septic shock ([facility] [A41.9, R65.21]   <principal problem not specified>       Precautions: Fall  Chart, physical therapy assessment, plan of care and goals were reviewed. ASSESSMENT  Patient continues with skilled PT services and is progressing towards goals. Pt continues to complain of numbness/tingling hands > feet but overall increasing ambulation endurance daily. Current Level of Function Impacting Discharge (mobility/balance): CGA for transfers/ambulation    Other factors to consider for discharge:          PLAN :  Patient continues to benefit from skilled intervention to address the above impairments. Continue treatment per established plan of care. to address goals.     Recommendation for discharge: (in order for the patient to meet his/her long term goals)  Therapy up to 5 days/week in SNF setting    This discharge recommendation:  Has been made in collaboration with the attending provider and/or case management    IF patient discharges home will need the following DME: patient owns DME required for discharge       SUBJECTIVE:   Patient stated I am leaving today to go to rehab.     OBJECTIVE DATA SUMMARY:   Critical Behavior:  Neurologic State: Alert, Appropriate for age  Orientation Level: Oriented X4  Cognition: Follows commands, Appropriate decision making  Safety/Judgement: Awareness of environment  Functional Mobility Training:  Bed Mobility:     Supine to Sit: Stand-by assistance  Sit to Supine: Stand-by assistance  Scooting: Stand-by assistance        Transfers:  Sit to Stand: Contact guard assistance  Stand to Sit: Contact guard assistance        Bed to Chair: Contact guard assistance                    Balance:  Sitting: Intact; Without support  Standing: Impaired; With support  Standing - Static: Good;Constant support  Standing - Dynamic : Good;Constant support  Ambulation/Gait Training:  Distance (ft): 150 Feet (ft)  Assistive Device: Walker, rolling;Gait belt  Ambulation - Level of Assistance: Contact guard assistance        Gait Abnormalities: Decreased step clearance(left foot slap > right foot slap)        Base of Support: Widened     Speed/Haylee: Slow  Step Length: Right shortened;Left shortened        Interventions: Safety awareness training;Verbal cues(to stay close to RW)     Pain Rating:  Pt denied pain. Activity Tolerance:   Fair and requires rest breaks between activities. Please refer to the flowsheet for vital signs taken during this treatment.     After treatment patient left in no apparent distress:   Sitting in chair, Call bell within reach, and Caregiver / family present    COMMUNICATION/COLLABORATION:   The patients plan of care was discussed with: Registered Nurse    Trayc Hicks, PT   Time Calculation: 15 mins

## 2020-01-06 NOTE — PROGRESS NOTES
Problem: Pressure Injury - Risk of  Goal: *Prevention of pressure injury  Description  Document Calderon Scale and appropriate interventions in the flowsheet. Outcome: Progressing Towards Goal  Note: Pressure Injury Interventions:       Moisture Interventions: Absorbent underpads, Maintain skin hydration (lotion/cream)    Activity Interventions: Increase time out of bed, Pressure redistribution bed/mattress(bed type), PT/OT evaluation    Mobility Interventions: HOB 30 degrees or less, Pressure redistribution bed/mattress (bed type), PT/OT evaluation    Nutrition Interventions: Document food/fluid/supplement intake    Friction and Shear Interventions: Apply protective barrier, creams and emollients                Problem: Falls - Risk of  Goal: *Absence of Falls  Description  Document Yaron Fall Risk and appropriate interventions in the flowsheet.   Outcome: Progressing Towards Goal  Note: Fall Risk Interventions:  Mobility Interventions: Communicate number of staff needed for ambulation/transfer, OT consult for ADLs, Patient to call before getting OOB, PT Consult for mobility concerns, PT Consult for assist device competence, Utilize walker, cane, or other assistive device         Medication Interventions: Patient to call before getting OOB, Teach patient to arise slowly    Elimination Interventions: Call light in reach, Patient to call for help with toileting needs

## 2020-01-06 NOTE — PROGRESS NOTES
[address] [facility] Adult  Hospitalist Group                                                                                          Hospitalist Progress Note  Catherine Clancy MD  Answering service: [phone] OR [phone] from in house phone        Date of Service:  2020  NAME:  Velma Peck  :  1935  MRN:  [id number]      Admission Summary:   Velma Peck is a 82 y.o. female with a past medical history of metastatic breast cancer on active chemotherapy, HFrEF, frequent UTIs, diabetes, CVA, RA, MI s/p stents who presented to the ED with reports of fever on , she was admitted to the ICU with septic shock. Interval history / Subjective:      Ms. Alirio Govea says she has no more diarrhea after the stool softners stopped. Assessment & Plan:     #. E. coli bacteremia and bacteriuria  #. Septic Shock: 2nd to above- resolved. -Off pressor   -Continue ceftriaxone. -ID following, recommended levofloxacin on discharge, stop date . #. NSTEMI: Cardiology following: medical Tx, no procedures planned.  -on aspirin,plavix,and coreg. She is allergic to statins  -No ACE-I/ARB due to ckd/unstable GFR. #. Acute on chronic systolic CHF: NYHA 4. Ef 86-28%.  -On carvedilol and lasix. Not on ARN I, ACEI or ARB possibly due to JEROD and unstable renal function. Cardiology on board    #. Acute hypoxic respiratory failure due to CHF exacerbation: - resolved. On room air  - off HFNC. continue lasix 80mg daily, monitor lytes    #DM: with hyperglycemia  -Accucheks  -Increase Lantus  8. ...>12 units       #Diarrhea last night,resolved after laxatives stool softners stopped. #. CKD3: monitor renal function, avoid nephrotoxics creatinine improving  -Maintain on lasix 80 mg every day per cards. Watch creatinine,stabe/better thus far. -Slight bump in creatinine. She had diarrhea for one day. Encouraged her to increase her oral intake   #. HypoNatremia: mild, monitor. Sodium normalized  #.  HypoPhosphatemia: replace, monitor  #. Constipation:stopped laxatives due to diarrhea. # Met. Breast cancer: followed by Dr. Ayse Sanchez. S/p recent Chemo- hold chemo till recovers  #Chronic Anemia: monitor HB, transfuse prn if Hb< 8 due to her NSTEMI   #Left calf pain: - Duplex -ve for DVTs. #. RA: known to Dr Eduar Pinedo, has R wrist swelling/discomfort. Consult placed, he called and advised will f/u op. #LYDIA: She wears CPAP at home, I have requested the family to bring her CPAP or may have to order from here      Code status: Full code  DVT prophylaxis: scds  Discharge inpatient rehab, pending insurance Auth. Hospital Problems  Date Reviewed: 11/13/2019          Codes Class Noted POA    Septic shock ([facility] [address] ICD-10-CM: A41.9, R65.21  ICD-9-CM: 038.9, 785.52, 995.92  12/25/2019 Unknown                Review of Systems:   A comprehensive review of systems was negative except for that written in the HPI. Vital Signs:    Last 24hrs VS reviewed since prior progress note. Most recent are:  Visit Vitals  /55 (BP 1 Location: Right arm, BP Patient Position: At rest)   Pulse 73   Temp 98.8 °F (37.1 °C)   Resp 16   Ht 5' 5\" (1.651 m)   Wt 61.4 kg (135 lb 5.8 oz)   SpO2 97%   BMI 22.53 kg/m²         Intake/Output Summary (Last 24 hours) at 1/6/2020 1035  Last data filed at 1/6/2020 0940  Gross per 24 hour   Intake 150 ml   Output    Net 150 ml        Physical Examination:             Constitutional:  No acute distress, cooperative   ENT:  Oral mucous moist, oropharynx benign. Resp:  Has port on the right chest wall. CTA bilaterally. No wheezing/rhonchi/rales. No accessory muscle use   CV:  Regular rhythm, normal rate, grade III systolic murmur    GI:  Soft, non distended, non tender. normoactive bowel sounds, no hepatosplenomegaly     Musculoskeletal:  SCDs on. No edema, warm, 2+ pulses throughout    Neurologic:  Moves all extremities. AAOx3, CN II-XII reviewed     Psych:  Good insight, Not anxious nor agitated.        Data Review:    Review and/or order of clinical lab test  Review and/or order of tests in the radiology section of CPT  Review and/or order of tests in the medicine section of CPT      Labs:     Recent Labs     01/06/20  0306   WBC 6.4   HGB 8.9*   HCT 29.5*        Recent Labs     01/06/20  0306 01/05/20  0405 01/04/20  0446    135* 138   K 3.6 3.8 3.9    99 101   CO2 30 31 30   BUN 33* 33* 31*   CREA 1.44* 1.33* 1.20*   * 139* 122*   CA 8.5 8.7 8.6     No results for input(s): SGOT, GPT, ALT, AP, TBIL, TBILI, TP, ALB, GLOB, GGT, AML, LPSE in the last 72 hours. No lab exists for component: AMYP, HLPSE  No results for input(s): INR, PTP, APTT, INREXT, INREXT in the last 72 hours. No results for input(s): FE, TIBC, PSAT, FERR in the last 72 hours. No results found for: FOL, RBCF   No results for input(s): PH, PCO2, PO2 in the last 72 hours. No results for input(s): CPK, CKNDX, TROIQ in the last 72 hours.     No lab exists for component: CPKMB  Lab Results   Component Value Date/Time    Cholesterol, total 74 04/16/2018 04:21 AM    HDL Cholesterol 25 04/16/2018 04:21 AM    LDL, calculated 31.6 04/16/2018 04:21 AM    Triglyceride 87 04/16/2018 04:21 AM    CHOL/HDL Ratio 3.0 04/16/2018 04:21 AM     Lab Results   Component Value Date/Time    Glucose (POC) 105 (H) 01/06/2020 06:14 AM    Glucose (POC) 204 (H) 01/05/2020 09:23 PM    Glucose (POC) 175 (H) 01/05/2020 04:55 PM    Glucose (POC) 207 (H) 01/05/2020 11:36 AM    Glucose (POC) 146 (H) 01/05/2020 06:42 AM     Lab Results   Component Value Date/Time    Color YELLOW/STRAW 12/25/2019 01:38 PM    Appearance CLOUDY (A) 12/25/2019 01:38 PM    Specific gravity 1.013 12/25/2019 01:38 PM    pH (UA) 5.5 12/25/2019 01:38 PM    Protein 100 (A) 12/25/2019 01:38 PM    Glucose NEGATIVE  12/25/2019 01:38 PM    Ketone NEGATIVE  12/25/2019 01:38 PM    Bilirubin NEGATIVE  12/25/2019 01:38 PM    Urobilinogen 0.2 12/25/2019 01:38 PM    Nitrites POSITIVE (A) 12/25/2019 01:38 PM    Leukocyte Esterase MODERATE (A) 12/25/2019 01:38 PM    Epithelial cells FEW 12/25/2019 01:38 PM    Bacteria 4+ (A) 12/25/2019 01:38 PM    WBC >100 (H) 12/25/2019 01:38 PM    RBC 0-5 12/25/2019 01:38 PM         Medications Reviewed:     Current Facility-Administered Medications   Medication Dose Route Frequency    lactobac ac& pc-s.therm-b.anim (BERYL Q/RISAQUAD)  1 Cap Oral DAILY    insulin glargine (LANTUS) injection 12 Units  12 Units SubCUTAneous QHS    [Held by provider] polyethylene glycol (MIRALAX) packet 17 g  17 g Oral BID    furosemide (LASIX) tablet 80 mg  80 mg Oral DAILY    carvedilol (COREG) tablet 6.25 mg  6.25 mg Oral BID WITH MEALS    alteplase (CATHFLO) 1 mg in sterile water (preservative free) 1 mL injection  1 mg InterCATHeter PRN    bacitracin 500 unit/gram packet 1 Packet  1 Packet Topical PRN    heparin (porcine) pf 500 Units  500 Units InterCATHeter PRN    acetaminophen (TYLENOL) tablet 1,000 mg  1,000 mg Oral Q6H PRN    cefTRIAXone (ROCEPHIN) 1 g in 0.9% sodium chloride (MBP/ADV) 50 mL  1 g IntraVENous Q24H    glycerin (adult) suppository 1 Suppository  1 Suppository Rectal Q12H PRN    evolocumab (REPATHA SURECLICK) pen injection 284 mg (Patient Supplied)  1 mL SubCUTAneous Q 14 DAYS    magic mouthwash cpd (without sucralfate)  5 mL Oral TIDAC    [Held by provider] senna (SENOKOT) tablet 17.2 mg  2 Tab Oral DAILY    oxyCODONE IR (ROXICODONE) tablet 2.5 mg  2.5 mg Oral Q4H PRN    simethicone (MYLICON) tablet 80 mg  80 mg Oral QID PRN    sodium chloride (NS) flush 5-10 mL  5-10 mL IntraVENous PRN    sodium chloride (NS) flush 5-40 mL  5-40 mL IntraVENous Q8H    sodium chloride (NS) flush 5-40 mL  5-40 mL IntraVENous PRN    clopidogrel (PLAVIX) tablet 75 mg  75 mg Oral DAILY    aspirin chewable tablet 81 mg  81 mg Oral DAILY    levothyroxine (SYNTHROID) tablet 88 mcg  88 mcg Oral 6am    glucose chewable tablet 16 g  4 Tab Oral PRN    glucagon (GLUCAGEN) injection 1 mg  1 mg IntraMUSCular PRN    dextrose 10% infusion 0-250 mL  0-250 mL IntraVENous PRN    insulin lispro (HUMALOG) injection   SubCUTAneous AC&HS     ______________________________________________________________________  EXPECTED LENGTH OF STAY: - - -  ACTUAL LENGTH OF STAY:          12                 Kat Mack MD

## 2020-01-06 NOTE — PROGRESS NOTES
OCCUPATIONAL THERAPY TREATMENT/WEEKLY RE-ASSESSMENT  Patient: Zenon Bacon (89 y.o. female)  Date: 1/6/2020  Diagnosis: Septic shock ([facility] [address] [A41.9, R65.21] <principal problem not specified>      Precautions: Fall  Chart, occupational therapy assessment, plan of care, and goals were reviewed. ASSESSMENT  Patient continues with skilled OT services and is progressing towards goals. Patient with decreased active ROM UE, LE, and trunk, strength throughout, sensation B hands and feet, coordination, , cognition (STM loss, complex processing), activity tolerance, functional reach, standing tolerance and increased pain limiting ADL independence. Education completed today on lower body ADLs with AE. Current Level of Function Impacting Discharge (ADLs): overall lower body ADLs max A (requires more practice with long handled AE), upper body ADLs moderate A, standing tolerance poor. Other factors to consider for discharge: chemo starts 1/20/2020; care aids during day but alone sometimes 1-4 nights as son does travel for work. PLAN :  Goals have been updated based on progression since last assessment. Patient continues to benefit from skilled intervention to address the above impairments. Continue to follow patient 3 times a week to address goals.     Recommend with staff: Celester Quince for all meals, completing upper body ADLs with setup    Recommend next OT session: standing ADLs    Recommendation for discharge: (in order for the patient to meet his/her long term goals)  Therapy 3 hours per day 5-7 days per week patient can tolerate 3 hours, highly motivated to increase activity tolerance prior to starting next batch of chemo    This discharge recommendation:  Has been made in collaboration with the attending provider and/or case management    IF patient discharges home will need the following DME: bedside commode and long handled AE, toilet tongs       SUBJECTIVE:   Patient stated I am going to rehab.    OBJECTIVE DATA SUMMARY:   Cognitive/Behavioral Status:  Neurologic State: Alert; Appropriate for age  Orientation Level: Oriented X4  Cognition: Follows commands; Appropriate decision making             Functional Mobility and Transfers for ADLs:  Bed Mobility:       Transfers:             Balance:  Sitting: Intact; Without support    ADL Intervention:             Lower body dressing: instruction and demonstrated use of reacher, dressing stick, wet wipes and toilet tongs for lower body ADLs with min A. Moderate A first attempt use of reacher, hand pain. Dressing stick would be nice with built up handle, but more difficult cognitively for her to manipulate for dressing lower body and upper body. Instruction on where to purchase. Handouts provided:   -DME: bathroom, dressing, bathing  -Energy conservation  -home safety check    Recommend BSC by bed at night. Discussed hygiene with long handled AE.                                          Therapeutic Exercises:       Pain:  throughout    Activity Tolerance:   Fair and requires rest breaks  Please refer to the flowsheet for vital signs taken during this treatment.     After treatment patient left in no apparent distress:   Sitting in chair, call bell, care aid, nurse    COMMUNICATION/COLLABORATION:   The patients plan of care was discussed with: Registered Nurse    Germania Hayden  Time Calculation: 26 mins

## 2020-01-06 NOTE — PROGRESS NOTES
Transition of Care Plan     ·  Inpatient Rehab; [facility] accepted the patient, pending auth  · [facility] (DC: [phone]) Toribio Medrano is still pending for IPR placement  · Transport: TBD- BLS vs family; CM to assist as needed   · Continue Medical Care   · Follow up with PCP/Specialist     10:42 AM: Per [facility] (DC: [phone]), Tor[facility] is still pending for IPR placement    1:08 PM: Patient has insurance auth. RN call to report [phone]     EMTALA at the bedside chart. *Fax MAR/D. C summary at [phone] prior call to report. *    Accepting MD: Dr. Melquiades Underwood     Accepting representative: Denver Johnson     Daughter at the bedside, updated the NINO AGARWAL, agreeable and states pt's son will transport at 3:30 pm.     Aleyda Ewing  Clinical     [phone]

## 2020-01-10 NOTE — PROGRESS NOTES
HISTORY OF PRESENT ILLNESS  Ricky Guzmán is a 82 y.o. female     SUMMARY:   Problem List  Date Reviewed: 1/9/2020          Codes Class Noted    Septic shock ([facility] ICD-10-CM: A41.9, R65.21  ICD-9-CM: 038.9, 785.52, 995.92  12/25/2019        Dizziness ICD-10-CM: A93  ICD-9-CM: 780.4  8/25/2019        Type 2 diabetes with nephropathy ([facility] ICD-10-CM: E11.21  ICD-9-CM: 250.40, 583.81  8/20/2018        CAD (coronary artery disease) ICD-10-CM: I25.10  ICD-9-CM: 414.00  6/21/2018        Metastatic breast cancer ([facility] ICD-10-CM: C50.919  ICD-9-CM: 174.9  6/1/2018        Acute on chronic systolic HF (heart failure) ([facility] ICD-10-CM: K64.26  ICD-9-CM: 428.23  5/19/2018        CKD (chronic kidney disease) stage 3, GFR 30-59 ml/min ([facility]) ICD-10-CM: N18.3  ICD-9-CM: 585.3  10/25/2017        Vitamin D deficiency ICD-10-CM: E55.9  ICD-9-CM: 268.9  6/16/2017        Asymptomatic hyperuricemia ICD-10-CM: E79.0  ICD-9-CM: 790.6  2/16/2017        Statin intolerance ICD-10-CM: Z78.9  ICD-9-CM: 995.27  12/21/2016        Long-term use of immunosuppressant medication ICD-10-CM: Z79.899  ICD-9-CM: V58.69  11/21/2016        Seropositive rheumatoid arthritis of multiple sites [facility]) ICD-10-CM: M05.79  ICD-9-CM: 714.0  9/28/2016        Primary osteoarthritis of both knees ICD-10-CM: M17.0  ICD-9-CM: 715.16  9/28/2016        Weakness due to cerebrovascular accident ICD-10-CM: [id number]  ICD-9-CM: Jesse Boo  4/2/2012        PAD (peripheral artery disease) ([facility] ICD-10-CM: I73.9  ICD-9-CM: 443.9  9/7/2011        Carotid bruit ICD-10-CM: R09.89  ICD-9-CM: 785.9  8/31/2011        Hyperlipidemia ICD-10-CM: E78.5  ICD-9-CM: 272.4  1/27/2010        Type 2 diabetes mellitus with neurologic complication, with long-term current use of insulin ([facility]) ICD-10-CM: E11.49, Z79.4  ICD-9-CM: 250.60, V58.67  9/2/2009        Colon cancer ([facility] ICD-10-CM: C18.9  ICD-9-CM: 153.9  9/2/2009        Age-related osteoporosis without current pathological fracture ICD-10-CM: M81.0  ICD-9-CM: 733.01  9/2/2009        HTN, goal below 140/90 ICD-10-CM: I10  ICD-9-CM: 401.9  9/2/2009        Anemia ICD-10-CM: D64.9  ICD-9-CM: 285.9  9/2/2009              Current Outpatient Medications on File Prior to Visit   Medication Sig    multivitamin (ONE A DAY) tablet Take 1 Tab by mouth daily.  docusate sodium (COLACE) 100 mg capsule Take 100 mg by mouth daily.  insulin glargine (LANTUS U-100 INSULIN) 100 unit/mL injection 10 Units by SubCUTAneous route nightly.  heparin sodium,porcine (HEPARIN, PORCINE,) 5,000 unit/mL injection 5,000 Units.  insulin regular (HUMULIN R REGULAR U-100 INSULN) 100 unit/mL injection by SubCUTAneous route. Sliding scale    glucagon (GLUCAGEN HYPOKIT) 1 mg injection 1 mg by IntraVENous route once. As needed    dextrose (GLUCOSE GEL PO) Take  by mouth. As needed    furosemide (LASIX) 40 mg tablet Take 2 Tabs by mouth daily. (Patient taking differently: Take 40 mg by mouth daily.)    L. acidoph & paracasei- S therm- Bifido (BERYL-Q/RISAQUAD) 8 billion cell cap cap Take 1 Cap by mouth daily for 30 days.  carvedilol (COREG) 6.25 mg tablet Take 1 Tab by mouth two (2) times daily (with meals) for 30 days.  clopidogrel (PLAVIX) 75 mg tab TAKE ONE TABLET BY MOUTH DAILY    REPATHA SURECLICK pen injection INJECT 1 ML SUBCUTANEOUS EVERY 14 DAYS    acetaminophen (TYLENOL) 325 mg tablet Take 2 Tabs by mouth every four (4) hours as needed. (Patient taking differently: Take 1,000 mg by mouth every four (4) hours as needed.)    nitroglycerin (NITROSTAT) 0.4 mg SL tablet 1 Tab by SubLINGual route as needed for Chest Pain. Up to 3 doses.  polyethylene glycol (MIRALAX) 17 gram packet Take 1 Packet by mouth daily.  levothyroxine (SYNTHROID) 88 mcg tablet Take 88 mcg by mouth Daily (before breakfast).  aspirin delayed-release 81 mg tablet Take 81 mg by mouth daily.  OMEGA-3 FATTY ACIDS/FISH OIL (OMEGA 3 FISH OIL PO) Take 300 mg by mouth daily.  CHOLECALCIFEROL, VITAMIN D3, (VITAMIN D-3 PO) Take 1,000 Units by mouth daily.  insulin degludec (TRESIBA FLEXTOUCH U-100) 100 unit/mL (3 mL) inpn 14 Units by SubCUTAneous route daily. No current facility-administered medications on file prior to visit. CARDIOLOGY STUDIES TO DATE:   echo lvef 30%, trivial pericardial effusion  19 mildly dilated lv with lvef 30-35%, lae, mod mr, mild to mod as    Chief Complaint   Patient presents with    Cardiomyopathy     HPI :        Ms. Diane Pantoja was recently hospitalized with urosepsis and did remarkably well. She is now in rehab and other than some generalized weakness and mild shortness of breath, she is doing great. Her Lasix dose was reduced to 40 mg a day.         CARDIAC ROS:   negative for chest pain, palpitations, syncope, orthopnea, paroxysmal nocturnal dyspnea, exertional chest pressure/discomfort, claudication, lower extremity edema    Family History   Problem Relation Age of Onset    Diabetes Mother     Stroke Mother     Diabetes Sister     Other Sister         fell and hit her head -  of this   Bellatrix.Wilson Diabetes Brother     Cancer Father         stomach    Diabetes Sister        Past Medical History:   Diagnosis Date    Acute on chronic systolic HF (heart failure) ([address] 2018    Age-related osteoporosis without current pathological fracture 2009    Anemia 2009    Asymptomatic hyperuricemia 2017    Breast cancer ([address]     CAD (coronary artery disease) 2018    Carotid bruit 2011    CH[facility]congestive [facility] failure) [facility]     CKD (chronic kidney disease) stage 3, GFR 30-59 ml/min ([facility]) 10/25/2017    Colon cancer ([address] 2009    surgery/chemo    Colon cancer ([address] 2009    DM (diabetes mellitus) ([address] 2009    GERD (gastroesophageal reflux disease)     H/O: CVA 2009    slight l sided weakness    Heart attack ([address]     HTN, goal below 140/90 2009    Hypothyroid 2009    Ill-defined condition     seasonal allergies    Long-term use of immunosuppressant medication 11/21/2016    Metastatic breast cancer ([facility] [address] 6/1/2018    Microalbuminuria 9/2/2009    Osteoporosis 9/2/2009    Other and unspecified hyperlipidemia 1/27/2010    PAD (peripheral artery disease) ([facility] [address] 9/7/2011    Primary osteoarthritis of both knees 9/28/2016    Rheumatoid arthritis involving ankle ([facility] [address] 9/28/2016    Rheumatoid arthritis(714.0) 9/2/2009    Seropositive rheumatoid arthritis of multiple sites ([address] 9/28/2016    Statin intolerance 12/21/2016    Type 2 diabetes mellitus with neurologic complication, with long-term current use of insulin ([facility] [address] 9/2/2009    Type 2 diabetes with nephropathy ([facility] [address] 8/20/2018    Unspecified essential hypertension 9/2/2009    Vitamin D deficiency 6/16/2017    Weakness due to cerebrovascular accident        GENERAL ROS:  A comprehensive review of systems was negative except for that written in the HPI.     Visit Vitals  /76 (BP 1 Location: Right arm, BP Patient Position: Sitting)   Pulse 77   Resp 16   Ht 5' 5\" (1.651 m)   SpO2 99%   BMI 22.53 kg/m²       Wt Readings from Last 3 Encounters:   01/06/20 135 lb 5.8 oz (61.4 kg)   11/15/19 132 lb (59.9 kg)   08/25/19 127 lb 3.3 oz (57.7 kg)            BP Readings from Last 3 Encounters:   01/10/20 134/76   01/06/20 117/56   11/15/19 114/62       PHYSICAL EXAM  General appearance: alert, cooperative, no distress, appears stated age  Neurologic: Alert and oriented X 3  Neck: supple, symmetrical, trachea midline, no adenopathy, no carotid bruit and no JVD  Lungs: clear to auscultation bilaterally  Heart: regular rate and rhythm, S1, S2 normal, no S3 or S4, systolic murmur: early systolic 2/6, crescendo at 2nd right intercostal space  Extremities: extremities normal, atraumatic, no cyanosis or edema    Lab Results   Component Value Date/Time    Cholesterol, total 74 04/16/2018 04:21 AM    Cholesterol, total 206 (H) 02/07/2017 09:07 AM Cholesterol, total 135 11/23/2012 09:09 AM    Cholesterol, total 101 11/30/2011 08:30 AM    Cholesterol, total 147 07/25/2011 08:44 AM    HDL Cholesterol 25 04/16/2018 04:21 AM    HDL Cholesterol 36 (L) 02/07/2017 09:07 AM    HDL Cholesterol 42 11/23/2012 09:09 AM    HDL Cholesterol 31 (L) 11/30/2011 08:30 AM    HDL Cholesterol 45 07/25/2011 08:44 AM    LDL, calculated 31.6 04/16/2018 04:21 AM    LDL, calculated 128 (H) 02/07/2017 09:07 AM    LDL, calculated 74 11/23/2012 09:09 AM    LDL, calculated 55 11/30/2011 08:30 AM    LDL, calculated 83 07/25/2011 08:44 AM    Triglyceride 87 04/16/2018 04:21 AM    Triglyceride 209 (H) 02/07/2017 09:07 AM    Triglyceride 95 11/23/2012 09:09 AM    Triglyceride 74 11/30/2011 08:30 AM    Triglyceride 93 07/25/2011 08:44 AM    CHOL/HDL Ratio 3.0 04/16/2018 04:21 AM    CHOL/HDL Ratio 3.2 06/17/2010 09:33 AM    CHOL/HDL Ratio 2.8 02/12/2009 08:19 AM     ASSESSMENT :      Ms. Werner Dixon is stable and fairly well compensated at this point. I think we should continue the 40 mg a day of Lasix and we are going to increase her Coreg back up to her usual dose of 12.5 mg b.i.d. The son told me that she got blood work today and so hopefully we will get a look at that. I am wondering if some of her shortness of breath is from worsening anemia. current treatment plan is effective, no change in therapy  lab results and schedule of future lab studies reviewed with patient  reviewed diet, exercise and weight control    Encounter Diagnoses   Name Primary?     Coronary artery disease involving native coronary artery, angina presence unspecified, unspecified whether native or transplanted heart Yes    Mixed hyperlipidemia     Ischemic cardiomyopathy     CKD (chronic kidney disease) stage 3, GFR 30-59 ml/min ([facility])     Essential hypertension      Orders Placed This Encounter    multivitamin (ONE A DAY) tablet    docusate sodium (COLACE) 100 mg capsule    insulin glargine (LANTUS U-100 INSULIN) 100 unit/mL injection    heparin sodium,porcine (HEPARIN, PORCINE,) 5,000 unit/mL injection    insulin regular (HUMULIN R REGULAR U-100 INSULN) 100 unit/mL injection    glucagon (GLUCAGEN HYPOKIT) 1 mg injection    dextrose (GLUCOSE GEL PO)       Follow-up and Dispositions    · Return in about 6 weeks (around 2/21/2020).          Francine Alvarado MD  1/10/2020

## 2020-01-24 NOTE — PROGRESS NOTES
Chief Complaint Patient presents with  
[facility] Follow Up  
  [facility] [place] 12/25/19 1. Have you been to the ER, urgent care clinic since your last visit? Hospitalized since your last visit? No 
 
2. Have you seen or consulted any other health care providers outside of the [address] [place] [address] since your last visit? Include any pap smears or colon screening.  No

## 2020-01-24 NOTE — PATIENT INSTRUCTIONS
Toenail Fungus: Care Instructions Your Care Instructions A toenail that is infected by a fungus usually turns white or yellow. As the fungus spreads, the nail turns a darker color and gets thicker, and its edges start to turn ragged and crumble. A bad infection can cause toe pain, and the nail may pull away from the toe. Toenails that are exposed to moisture and warmth a lot are more likely to get infected by a fungus. This can happen from wearing sweaty shoes often and from walking barefoot on shower floors. It is hard to treat toenail fungus, and the infection can return after it has cleared up. But medicines can sometimes get rid of toenail fungus for good. If the infection is very bad, or if it causes a lot of pain, you may need to have the nail removed. Follow-up care is a key part of your treatment and safety. Be sure to make and go to all appointments, and call your doctor if you are having problems. It's also a good idea to know your test results and keep a list of the medicines you take. How can you care for yourself at home? · Take your medicines exactly as prescribed. Call your doctor if you have any problems with your medicine. You will get more details on the specific medicines your doctor prescribes. · If your doctor gave you a cream or liquid to put on your toenail, use it exactly as directed. · Wash your feet often, and wash your hands after touching your feet. · Keep your toenails clean and dry. Dry your feet completely after you bathe and before you put on shoes and socks. · Keep your toenails trimmed. · Change socks often. Wear dry socks that absorb moisture. · Do not go barefoot in public places. · Use a spray or powder that fights fungus on your feet and in your shoes. · Do not pick at the skin around your nails. · Do not use nail polish or fake nails on your toenails. When should you call for help? Call your doctor now or seek immediate medical care if:   · You have signs of infection, such as: 
? Increased pain, swelling, warmth, or redness. ? Red streaks leading from the site. ? Pus draining from the site. ? A fever.  
  · You have new or increased toe pain.  
 Watch closely for changes in your health, and be sure to contact your doctor if: 
  · You do not get better as expected. Where can you learn more? Go to http://david-noni.info/. Enter D202 in the search box to learn more about \"Toenail Fungus: Care Instructions. \" Current as of: April 1, 2019 Content Version: 12.2 © 7201-6724 [employer]. Care instructions adapted under license by [employer] (which disclaims liability or warranty for this information). If you have questions about a medical condition or this instruction, always ask your healthcare professional. [address] any warranty or liability for your use of this information.

## 2020-01-24 NOTE — PROGRESS NOTES
Patient Name: Zenon Bacon MRN: [id number] SUBJECTIVE Zenon Bacon is a 82 y.o. female who presents with the following:  
 
Transition Care Management: 
 
Admission: 12/25/19 Discharge: 1/6/20 Location: [address] [place] [address] Diagnosis: Septic shock due to E. coli bacteremia from UTI Nurse Navigator note: reviewed We reviewed the records. She presented with septic shock due to urosepsis. She was admitted to the ICU with IV antibiotics and vasopressors. She had acute hypoxic respiratory failure due to CHF exacerbation and an NSTEMI. She was discharged to [facility] rehab and has been home since last Thursday. She is taking her medications as directed & without side effects. These symptoms are improving. Since discharge, she is felt weaker and unbalanced. [facility] are asking if she can get home health PT and OT until she returns to her baseline activity. Has not had a follow-up urine cultures since leaving the hospital. 
Her current endocrinologist, Dr. Samantha Peterson, may be retiring soon so they are asking if she can transition care here or if she needs to see another endocrinologist. 
Has had an abnormally big toenail on her right big toe for quite some time. Does not see a podiatrist. 
 
Review of Systems Constitutional: Positive for malaise/fatigue. Negative for fever and weight loss. Respiratory: Negative for cough, hemoptysis, shortness of breath and wheezing. Cardiovascular: Negative for chest pain, palpitations, leg swelling and PND. Gastrointestinal: Negative for abdominal pain, constipation, diarrhea, nausea and vomiting. Musculoskeletal: Negative for falls. The patient's medications, allergies, past medical history, surgical history, family history and social history were reviewed and updated where appropriate. Prior to Admission medications Medication Sig Start Date End Date Taking? Authorizing Provider ondansetron hcl (ZOFRAN) 8 mg tablet Take 8 mg by mouth every eight (8) hours as needed. 11/12/19  Yes Provider, Historical  
insulin aspart U-100 (NOVOLOG FLEXPEN U-100 INSULIN) 100 unit/mL (3 mL) inpn by SubCUTAneous route. PER SLIDING SCALE   Yes Provider, Historical  
OTHER PATIETN REPORTS  USING A MOUTHWAHS AS NEEDED   Yes Provider, Historical  
multivitamin (ONE A DAY) tablet Take 1 Tab by mouth daily. Yes Provider, Historical  
dextrose (GLUCOSE GEL PO) Take  by mouth. As needed   Yes Provider, Historical  
furosemide (LASIX) 40 mg tablet Take 2 Tabs by mouth daily. Patient taking differently: Take 40 mg by mouth daily. 1/6/20  Yes Camilo Najera MD  
carvedilol (COREG) 6.25 mg tablet Take 1 Tab by mouth two (2) times daily (with meals) for 30 days. 1/6/20 2/5/20 Yes Camilo Najera MD  
clopidogrel (PLAVIX) 75 mg tab TAKE ONE TABLET BY MOUTH DAILY Patient taking differently: 75 mg daily. 5/6/19  Yes Vishal Allen MD  
insulin degludec (TRESIBA FLEXTOUCH U-100) 100 unit/mL (3 mL) inpn 14 Units by SubCUTAneous route daily. Yes Provider, Historical  
REPATHA SURECLICK pen injection INJECT 1 ML SUBCUTANEOUS EVERY 14 DAYS 1/31/19  Yes Vishal Allen MD  
acetaminophen (TYLENOL) 325 mg tablet Take 2 Tabs by mouth every four (4) hours as needed. Patient taking differently: Take 1,000 mg by mouth every four (4) hours as needed. 6/21/18  Yes Harish Strong MD  
nitroglycerin (NITROSTAT) 0.4 mg SL tablet 1 Tab by SubLINGual route as needed for Chest Pain. Up to 3 doses. 6/6/18  Yes Kathe Pierre MD  
polyethylene glycol (MIRALAX) 17 gram packet Take 1 Packet by mouth daily. 5/12/18  Yes Lázaro Blakely MD  
levothyroxine (SYNTHROID) 88 mcg tablet Take 88 mcg by mouth Daily (before breakfast). Yes Provider, Historical  
aspirin delayed-release 81 mg tablet Take 81 mg by mouth daily.    Yes Provider, Historical  
OMEGA-3 FATTY ACIDS/FISH OIL (OMEGA 3 FISH OIL PO) Take 300 mg by mouth daily. Yes Provider, Historical  
CHOLECALCIFEROL, VITAMIN D3, (VITAMIN D-3 PO) Take 1,000 Units by mouth daily. Yes Provider, Historical  
docusate sodium (COLACE) 100 mg capsule Take 100 mg by mouth daily. Provider, Historical  
insulin glargine (LANTUS U-100 INSULIN) 100 unit/mL injection 10 Units by SubCUTAneous route nightly. 1/24/20  Provider, Historical  
heparin sodium,porcine (HEPARIN, PORCINE,) 5,000 unit/mL injection 5,000 Units. 1/24/20  Provider, Historical  
insulin regular (HUMULIN R REGULAR U-100 INSULN) 100 unit/mL injection by SubCUTAneous route. Sliding scale  1/24/20  Provider, Historical  
glucagon (GLUCAGEN HYPOKIT) 1 mg injection 1 mg by IntraVENous route once. As needed  1/24/20  Provider, Historical  
L. acidoph & paracasei- S therm- Bifido (BERYL-Q/RISAQUAD) 8 billion cell cap cap Take 1 Cap by mouth daily for 30 days. 1/7/20 1/24/20  Lupillo Santa MD  
 
 
Allergies Allergen Reactions  Statins-Hmg-Coa Reductase Inhibitors Other (comments) Intolerant to statins  Sulfa (Sulfonamide Antibiotics) Other (comments)  
  syncope OBJECTIVE Visit Vitals /50 (BP 1 Location: Right arm, BP Patient Position: Sitting) Pulse 74 Temp 98.5 °F (36.9 °C) (Oral) Resp 18 Ht 5' 5\" (1.651 m) Wt 131 lb 9.6 oz (59.7 kg) SpO2 98% BMI 21.90 kg/m² Physical Exam 
Vitals signs and nursing note reviewed. Constitutional:   
   General: She is not in acute distress. Appearance: She is not diaphoretic. Comments: In wheelchair Eyes:  
   Conjunctiva/sclera: Conjunctivae normal.  
   Pupils: Pupils are equal, round, and reactive to light. Musculoskeletal: Normal range of motion. Skin: 
   General: Skin is warm and dry. Comments: Thickened toenail of right great toe Neurological:  
   Mental Status: She is alert and oriented to person, place, and time. Gait: Gait is intact.   
Psychiatric:     
 Mood and Affect: Mood and affect normal.     
   Cognition and Memory: Memory normal.     
   Judgment: Judgment normal.  
 
 
 
 
ASSESSMENT AND PLAN Franco Lam is a 82 y.o. female who presents today for: 
 
1. Hospital discharge follow-up Clinically improving. 2. Urinary tract infection without hematuria, site unspecified Will f/u with culture. - CULTURE, URINE 3. Toenail fungus 
- REFERRAL TO PODIATRY 4. Gait instability Recommend PT/OT. - [address] 5. Physical deconditioning As above. - [address] 6. Septic shock ([address] 6. Type 2 diabetes mellitus without complication, with long-term current use of insulin ([facility] Pt to request records from endocrinology for me to review; if DM uncontrolled, consider referral to a new endocrinologist. 
 
 
Medications Discontinued During This Encounter Medication Reason  L. acidoph & paracasei- S therm- Bifido (BERYL-Q/RISAQUAD) 8 billion cell cap cap  glucagon (GLUCAGEN HYPOKIT) 1 mg injection  heparin sodium,porcine (HEPARIN, PORCINE,) 5,000 unit/mL injection  insulin glargine (LANTUS U-100 INSULIN) 100 unit/mL injection  insulin regular (HUMULIN R REGULAR U-100 INSULN) 100 unit/mL injection Medication risks/benefits/costs/interactions/alternatives discussed with patient. Advised patient to call back or return to office if symptoms worsen/change/persist. If patient cannot reach us or should anything more severe/urgent arise he/she should proceed directly to the nearest emergency department. Discussed expected course/resolution/complications of diagnosis in detail with patient. Patient given a written after visit summary which includes his/her diagnoses, current medications and vitals. Patient expressed understanding with the diagnosis and plan. Yves Hernández M.D.

## 2020-01-29 NOTE — ANCILLARY DISCHARGE INSTRUCTIONS
[facility] [address], [address] Phone: [phone]  Fax: [phone] Discharge Summary  2-15 Patient name: Ortiz Pastrana  : 1935  Provider#: [id number] Referral source: Raquel Dutta MD     
Medical/Treatment Diagnosis: Vertigo [R42] Prior Hospitalization: see medical history Comorbidities: See Plan of Care Prior Level of Function:See Plan of Care Medications: Verified on Patient Summary List 
 
Start of Care: 21      Onset Date:1.5 years ago Visits from Start of Care: 1     Missed Visits: 4 Reporting Period : 19 to 19 ASSESSMENT/SUMMARY OF CARE: The patient has not been seen since initial evaluation and has missed 4 visits. RECOMMENDATIONS: 
[x]Discontinue therapy: []Patient has reached or is progressing toward set goals [x]Patient is non-compliant or has abdicated 
    []Due to lack of appreciable progress towards set goals []Other Sinan Lebron, PT 2020

## 2020-02-06 NOTE — PROGRESS NOTES
Outbound call to patient. [phone]. Left message to return call back to the office regarding lab results.

## 2020-02-12 NOTE — PROGRESS NOTES
( Late Entry)  02/07/019 Outbound call to patients son. Patients date of birth verified. Patient son made aware of lab results and verbalized understanding.

## 2020-02-13 NOTE — TELEPHONE ENCOUNTER
----- Message from Paula Gonzáles sent at 2/13/2020  3:50 PM EST -----  Regarding: Dr. Clarke Abt: [phone]  Caller's first and last name: Cameron Heart ( Son)  Reason for call: Lab results   Callback required yes/no and why: Yes   Best contact number(s): [phone]  Details to clarify the request: Caller stated that he got a call in regards to his mother's lab results and was advised to call the practice.

## 2020-02-13 NOTE — TELEPHONE ENCOUNTER
Outbound call to patients son. Patient son made aware of lab results on 02/07/2020. Lab results re given to patients son and understanding was verbalized.

## 2020-02-17 NOTE — PROGRESS NOTES
HISTORY OF PRESENT ILLNESS Anne-Marie Campbell is a 82 y.o. female SUMMARY:  
Problem List  Date Reviewed: 2/17/2020 Codes Class Noted Septic shock (HCC) ICD-10-CM: A41.9, R65.21 ICD-9-CM: 038.9, 785.52, 995.92  12/25/2019 Dizziness ICD-10-CM: C85 ICD-9-CM: 780.4  8/25/2019 Type 2 diabetes with nephropathy ([facility] ICD-10-CM: E11.21 
ICD-9-CM: 250.40, 583.81  8/20/2018 CAD (coronary artery disease) ICD-10-CM: I25.10 ICD-9-CM: 414.00  6/21/2018 Metastatic breast cancer ([facility] ICD-10-CM: O55.902 ICD-9-CM: 174.9  6/1/2018 Acute on chronic systolic HF (heart failure) ([facility]) ICD-10-CM: B15.92 ICD-9-CM: 428.23  5/19/2018 CKD (chronic kidney disease) stage 3, GFR 30-59 ml/min ([facility]) ICD-10-CM: N18.3 ICD-9-CM: 585.3  10/25/2017 Vitamin D deficiency ICD-10-CM: E55.9 ICD-9-CM: 268.9  6/16/2017 Asymptomatic hyperuricemia ICD-10-CM: E79.0 ICD-9-CM: 790.6  2/16/2017 Statin intolerance ICD-10-CM: Z78.9 ICD-9-CM: 995.27  12/21/2016 Long-term use of immunosuppressant medication ICD-10-CM: Z79.899 ICD-9-CM: V58.69  11/21/2016 Seropositive rheumatoid arthritis of multiple sites [facility]) ICD-10-CM: M05.79 ICD-9-CM: 714.0  9/28/2016 Primary osteoarthritis of both knees ICD-10-CM: M17.0 ICD-9-CM: 715.16  9/28/2016 Weakness due to cerebrovascular accident ICD-10-CM: JID5811 ICD-9-CM: IDH1443  4/2/2012 PAD (peripheral artery disease) (HCC) ICD-10-CM: I73.9 ICD-9-CM: 443.9  9/7/2011 Carotid bruit ICD-10-CM: R09.89 ICD-9-CM: 785.9  8/31/2011 Hyperlipidemia ICD-10-CM: E78.5 ICD-9-CM: 272.4  1/27/2010 Type 2 diabetes mellitus with neurologic complication, with long-term current use of insulin (HCC) ICD-10-CM: E11.49, Z79.4 ICD-9-CM: 250.60, V58.67  9/2/2009 Colon cancer [facility]) ICD-10-CM: C18.9 ICD-9-CM: 153.9  9/2/2009 Age-related osteoporosis without current pathological fracture ICD-10-CM: M81.0 ICD-9-CM: 733.01  9/2/2009 HTN, goal below 140/90 ICD-10-CM: I10 
ICD-9-CM: 401.9  9/2/2009 Anemia ICD-10-CM: D64.9 ICD-9-CM: 285.9  9/2/2009 Current Outpatient Medications on File Prior to Visit Medication Sig  carvediloL (COREG) 12.5 mg tablet Take 12.5 mg by mouth two (2) times daily (with meals).  REPATHA SURECLICK pen injection ADMINISTER 1 ML UNDER THE SKIN EVERY 14 DAYS  insulin aspart U-100 (NOVOLOG FLEXPEN U-100 INSULIN) 100 unit/mL (3 mL) inpn by SubCUTAneous route. PER SLIDING SCALE  
 OTHER daily. MOUTH [address] DAILY  multivitamin (ONE A DAY) tablet Take 1 Tab by mouth daily.  dextrose (GLUCOSE GEL PO) Take 2 Tabs by mouth as needed for Other (low blood sugar). As needed  furosemide (LASIX) 40 mg tablet Take 2 Tabs by mouth daily. (Patient taking differently: Take 40 mg by mouth daily. 40 mg daily alternating with 80 mg every other day)  clopidogrel (PLAVIX) 75 mg tab TAKE ONE TABLET BY MOUTH DAILY (Patient taking differently: 75 mg daily.)  insulin degludec (TRESIBA FLEXTOUCH U-100) 100 unit/mL (3 mL) inpn 14 Units by SubCUTAneous route daily.  acetaminophen (TYLENOL) 325 mg tablet Take 2 Tabs by mouth every four (4) hours as needed. (Patient taking differently: Take 1,000 mg by mouth every four (4) hours as needed.)  nitroglycerin (NITROSTAT) 0.4 mg SL tablet 1 Tab by SubLINGual route as needed for Chest Pain. Up to 3 doses.  polyethylene glycol (MIRALAX) 17 gram packet Take 1 Packet by mouth daily.  levothyroxine (SYNTHROID) 88 mcg tablet Take 88 mcg by mouth Daily (before breakfast).  aspirin delayed-release 81 mg tablet Take 81 mg by mouth daily.  OMEGA-3 FATTY ACIDS/FISH OIL (OMEGA 3 FISH OIL PO) Take 300 mg by mouth daily.  CHOLECALCIFEROL, VITAMIN D3, (VITAMIN D-3 PO) Take 1,000 Units by mouth daily. No current facility-administered medications on file prior to visit. CARDIOLOGY STUDIES TO DATE: 
 echo lvef 30%, trivial pericardial effusion 19 mildly dilated lv with lvef 30-35%, lae, mod mr, mild to mod as Chief Complaint Patient presents with  Cardiomyopathy HPI : 
Ms. Natasha Rivas is doing really well. She is back on chemo now and tolerating it. Her weight is down just about 2 pounds and she is due to see her nephrologist tomorrow. She is not walking much because she is afraid of falls. CARDIAC ROS:  
negative for chest pain, dyspnea, palpitations, syncope, orthopnea, paroxysmal nocturnal dyspnea, exertional chest pressure/discomfort, claudication, lower extremity edema Family History Problem Relation Age of Onset  Diabetes Mother  Stroke Mother  Diabetes Sister  Other Sister   
     fell and hit her head -  of this  Diabetes Brother  Cancer Father   
     stomach  Diabetes Sister Past Medical History:  
Diagnosis Date  Acute on chronic systolic HF (heart failure) ([address] 2018  Age-related osteoporosis without current pathological fracture 2009  Anemia 2009  Asymptomatic hyperuricemia 2017  Breast cancer ([address]  CAD (coronary artery disease) 2018  Carotid bruit 2011  CHF (congestive heart failure) ([address]  CKD (chronic kidney disease) stage 3, GFR 30-59 ml/min ([facility]) 10/25/2017  Colon cancer ([address] 2009  
 surgery/chemo  Colon cancer ([address] 2009  DM (diabetes mellitus) ([address] 2009  GERD (gastroesophageal reflux disease)  H/O: CVA 2009  
 slight l sided weakness  Heart attack ([address]  HTN, goal below 140/90 2009  Hypothyroid 2009  Ill-defined condition   
 seasonal allergies  Long-term use of immunosuppressant medication 2016  Metastatic breast cancer ([address] 2018  Microalbuminuria 2009  Osteoporosis 2009  Other and unspecified hyperlipidemia 1/27/2010  PAD (peripheral artery disease) ([facility] 9/7/2011  Primary osteoarthritis of both knees 9/28/2016  Rheumat[facility] arthritis involving ankle ([facility] 9/28/2016  Rheumatoid arthritis(714.0) 9/2/2009  Seropositive rheumatoid arthritis of multiple sites ([facility] 9/28/2016  Statin intolerance 12/21/2016  Type 2 diabetes mellitus with neurologic complication, with long-term current use of insulin ([facility] 9/2/2009  Type 2 diabetes with nephropathy ([facility] 8/20/2018  Unspecified essential hypertension 9/2/2009  Vitamin D deficiency 6/16/2017  Weakness due to cerebrovascular accident GENERAL ROS: 
A comprehensive review of systems was negative except for that written in the HPI. Visit Vitals /60 (BP 1 Location: Left arm, BP Patient Position: Sitting) Pulse 68 Resp 16 Ht 5' 1\" (1.549 m) Wt 132 lb (59.9 kg) SpO2 98% BMI 24.94 kg/m² Wt Readings from Last 3 Encounters:  
02/17/20 132 lb (59.9 kg) 01/30/20 128 lb (58.1 kg) 01/24/20 131 lb 9.6 oz (59.7 kg) BP Readings from Last 3 Encounters:  
02/17/20 124/60  
02/13/20 112/60  
02/13/20 124/67 PHYSICAL EXAM 
General appearance: alert, cooperative, no distress, appears stated age Neurologic: Alert and oriented X 3 Neck: supple, symmetrical, trachea midline, no adenopathy, no carotid bruit and no JVD Lungs: clear to auscultation bilaterally Heart: regular rate and rhythm, S1, S2 normal, no S3 or S4, systolic murmur: early systolic 2/6, crescendo at 2nd right intercostal space Extremities: extremities normal, atraumatic, no cyanosis or edema Lab Results Component Value Date/Time  Cholesterol, total 74 04/16/2018 04:21 AM  
 Cholesterol, total 206 (H) 02/07/2017 09:07 AM  
 Cholesterol, total 135 11/23/2012 09:09 AM  
 Cholesterol, total 101 11/30/2011 08:30 AM  
 Cholesterol, total 147 07/25/2011 08:44 AM  
 HDL Cholesterol 25 04/16/2018 04:21 AM  
 HDL Cholesterol 36 (L) 02/07/2017 09:07 AM  
 HDL Cholesterol 42 11/23/2012 09:09 AM  
 HDL Cholesterol 31 (L) 11/30/2011 08:30 AM  
 HDL Cholesterol 45 07/25/2011 08:44 AM  
 LDL, calculated 31.6 04/16/2018 04:21 AM  
 LDL, calculated 128 (H) 02/07/2017 09:07 AM  
 LDL, calculated 74 11/23/2012 09:09 AM  
 LDL, calculated 55 11/30/2011 08:30 AM  
 LDL, calculated 83 07/25/2011 08:44 AM  
 Triglyceride 87 04/16/2018 04:21 AM  
 Triglyceride 209 (H) 02/07/2017 09:07 AM  
 Triglyceride 95 11/23/2012 09:09 AM  
 Triglyceride 74 11/30/2011 08:30 AM  
 Triglyceride 93 07/25/2011 08:44 AM  
 CHOL/HDL Ratio 3.0 04/16/2018 04:21 AM  
 CHOL/HDL Ratio 3.2 06/17/2010 09:33 AM  
 CHOL/HDL Ratio 2.8 02/12/2009 08:19 AM  
 
ASSESSMENT :     
Ms. Preethi Anderson is stable, asymptomatic, and well compensated from a cardiac perspective on a good medical regimen. She needs no cardiac testing at this time. current treatment plan is effective, no change in therapy 
lab results and schedule of future lab studies reviewed with patient 
reviewed diet, exercise and weight control Encounter Diagnoses Name Primary?  Ischemic cardiomyopathy Yes  Coronary artery disease involving native coronary artery, angina presence unspecified, unspecified whether native or transplanted heart  Mixed hyperlipidemia   
 HTN, goal below 140/90  Statin intolerance  Nonrheumatic aortic valve stenosis No orders of the defined types were placed in this encounter. Follow-up and Dispositions · Return in about 3 months (around 5/17/2020). Magalys Romero MD 
2/17/2020 Please note that this dictation was completed with [email or web address], the computer voice recognition software. Quite often unanticipated grammatical, syntax, homophones, and other interpretive errors are inadvertently transcribed by the computer software. Please disregard these errors. Please excuse any errors that have escaped final proofreading. Thank you.

## 2020-02-24 NOTE — PROGRESS NOTES
Chief Complaint Patient presents with  Arthritis 1. Have you been to the ER, urgent care clinic since your last visit? Hospitalized since your last visit? Yes [facility] for UTI and fever 2. Have you seen or consulted any other health care providers outside of the [address] [place] [address] since your last visit? Include any pap smears or colon screening.  No

## 2020-02-24 NOTE — PROGRESS NOTES
REASON FOR VISIT This is a follow up for Ms. Ledesma for ICD-10-CM 1. Seropositive rheumatoid arthritis of multiple sites ([facility] M05.79 2. Age-related osteoporosis without current pathological fracture M81.0 Inflammatory arthritis phenotype includes: Anti-CCP positive: yes (71) Rheumatoid factor positive: yes (81.8) Erosive disease: yes Extra-articular manifestations include: rheumatoid nodules Immunosuppression Screening (12/20/2017): Quantiferon TB: negative PPD:  Not performed Hepatitis B: negative Hepatitis C: negative Therapy History includes: 
Current DMARD therapy includes: rituximab 1000 mg IV every 6 months (6/2019 to present) Prior DMARD therapy includes: methotrexate 25 mg subcutaneous, Orencia subcutaneous (3/2017-9/2017), Enbrel 50 mg weekly (10/2017-4/2018) The following DMARDs have been ineffective: Martinez The following DMARDs were stopped because of side effects: none Contra-indicated DMARDs because of chronic kidney disease: methotrexate Contra-indicated DMARDs because of heart failure: anti-TNF Osteoporosis Historical Synopsis 
  Height loss since age [id number] [place] (at least two inches): 1.5 Fracture history includes: yes (right radius and ulna, hairline in legs, L3, T10, T12 vertebral fractures) Family history of hip fracture: no 
  
Daily calcium intake is 1200 mg 
Daily vitamin D intake is 1000 IU 
  
Smoking history: no 
Alcohol consumption: no 
Prednisone history: no 
  
Exercise: yes (walks around the house, core exercises) 
  
Previous work-up for osteoporosis includes the following: DEXA Scan: 11/23/2016 Vitamin 25OH D level: 45.1 (1/28/2019) PTH: 101 (1/28/2019) TSH: 0.017 (11/16/2018) 
  Therapy History includes: 
Current osteoporosis therapy includes: Xgeva (10/2018 to present) Prior osteoporosis therapy includes: Lilia Galindo (1/19/2017-2/11/2019) The following osteoporosis have been ineffective: none The following osteoporosis were stopped because of side effects: none 
  
Immunizations:  
Immunization History Administered Date(s) Administered  (RETIRED) Pneumococcal Vaccine (Unspecified Type) 06/29/2006  Influenza High Dose Vaccine PF 11/01/2016, 09/11/2019  Influenza Vaccine (Tri) Adjuvanted 10/08/2018  Influenza Vaccine Split 10/15/2010, 10/01/2011  Influenza Vaccine Whole 10/01/2004  Pneumococcal Conjugate (PCV-13) 10/05/2015  Pneumococcal Vaccine (Unspecified Type) 08/05/2013 Active problems include: 
 
Patient Active Problem List  
Diagnosis Code  Type 2 diabetes mellitus with neurologic complication, with long-term current use of insulin ([facility]) E11.49, Z79.4  Colon cancer ([facility] C18.9  Age-related osteoporosis without current pathological fracture M81.0  
 HTN, goal below 140/90 I10  Anemia D64.9  Hyperlipidemia E78.5  Carotid bruit R09.89  
 PAD (peripheral artery disease) ([facility]) I73.9  Weakness due to cerebrovascular accident [id number]  Seropositive rheumatoid arthritis of multiple sites ([facility] M05.79  
 Primary osteoarthritis of both knees M17.0  Long-term use of immunosuppressant medication Z79.899  Statin intolerance Z78.9  Asymptomatic hyperuricemia E79.0  Vitamin D deficiency E55.9  CKD (chronic kidney disease) stage 3, GFR 30-59 ml/min ([facility]) N18.3  Acute on chronic systolic HF (heart failure) ([facility]) I50.23  
 Metastatic breast cancer ([facility] C50.919  
 CAD (coronary artery disease) I25.10  Type 2 diabetes with nephropathy ([facility]) E11.21  
 Dizziness R42  Septic shock ([facility]) A41.9, R65.21  
 
HISTORY OF PRESENT ILLNESS Ms. Carrillo Canurban returns for an acute visit. On her last visit, I continued rituximab 1000 mg every 6 months. Her most recent dose was Xgeva was 2/19/2020, which she received with good tolerance. Today, she complains of numbness and tingling in her hands. She denies swelling. She endorses sores in mouth, rash from chemotherapy. She denies fever, weight loss, blurred vision, vision loss, ankle swelling, dry cough, dyspnea, nausea, vomiting, dysphagia, abdominal pain, black or bloody stool, fall since last visit, easy bruising and increased thirst. 
 
Last toxicity monitoring by blood work was done on 8/10/2019 and did not reveal any significant adverse effects, except WBC 3.3, Hct 33.5%, creatinine 1.75 mg/dL, eGFR 27. Most recent inflammatory markers from 9/28/2017 revealed a ESR 18 mm/hr and CRP 1.5 mg/dL. The patient has had a interval hospital admissions without surgeries. REVIEW OF SYSTEMS A comprehensive review of systems was performed and pertinent results are documented in the HPI, review of systems is otherwise non-contributory. PAST MEDICAL HISTORY She has a past medical history of Acute on chronic systolic HF (heart failure) ([facility] (5/19/2018), Age-related osteoporosis without current pathological fracture (9/2/2009), Anemia (9/2/2009), Asymptomatic hyperuricemia (2/16/2017), Breast cancer ([facility] CAD (coronary artery disease) (6/21/2018), Carotid bruit (8/31/2011), CHF (congestive heart failure) ([facility] CKD (chronic kidney disease) stage 3, GFR 30-59 ml/min ([facility] (10/25/2017), Colon cancer ([facility] (9/2/2009), Colon cancer ([facility] (9/2/2009), DM (diabetes mellitus) ([facility] (9/2/2009), GERD (gastroesophageal reflux disease), H/O: CVA (9/2/2009), Heart attack ([address] HTN, goal below 140/90 (9/2/2009), Hypothyroid (9/2/2009), Ill-defined condition, Long-term use of immunosuppressant medication (11/21/2016), Metastatic breast cancer ([address] (6/1/2018), Microalbuminuria (9/2/2009), Osteoporosis (9/2/2009), Other and unspecified hyperlipidemia (1/27/2010), PAD (peripheral artery disease) ([facility] (9/7/2011), Primary osteoarthritis of both knees (9/28/2016), Rheumatoid arthritis involving ankle ([facility] (9/28/2016), Rheumatoid arthritis(714.0) (9/2/2009), Seropositive rheumatoid arthritis of multiple sites ([facility] (9/28/2016), Statin intolerance (12/21/2016), Type 2 diabetes mellitus with neurologic complication, with long-term current use of insulin ([facility] (9/2/2009), Type 2 diabetes with nephropathy ([facility] (8/20/2018), Unspecified essential hypertension (9/2/2009), Vitamin D deficiency (6/16/2017), and Weakness due to cerebrovascular accident. FAMILY HISTORY Her family history includes Cancer in her father; Diabetes in her brother, mother, sister, and sister; Other in her sister; Stroke in her mother. SOCIAL HISTORY She reports that she has never smoked. She has never used smokeless tobacco. She reports that she does not drink alcohol or use drugs. MEDICATIONS Current Outpatient Medications Medication Sig Dispense Refill  riTUXimab in 0.9% sodium chloride solution 1,000 mg by IntraVENous route every 6 months.  carvediloL (COREG) 12.5 mg tablet Take 12.5 mg by mouth two (2) times daily (with meals).  REPATHA SURECLICK pen injection ADMINISTER 1 ML UNDER THE SKIN EVERY 14 DAYS 2 mL 2  
 insulin aspart U-100 (NOVOLOG FLEXPEN U-100 INSULIN) 100 unit/mL (3 mL) inpn by SubCUTAneous route. PER SLIDING SCALE    
 OTHER daily. MOUTH [address] DAILY  multivitamin (ONE A DAY) tablet Take 1 Tab by mouth daily.  dextrose (GLUCOSE GEL PO) Take 2 Tabs by mouth as needed for Other (low blood sugar). As needed  furosemide (LASIX) 40 mg tablet Take 2 Tabs by mouth daily. (Patient taking differently: Take 40 mg by mouth daily. 40 mg daily alternating with 80 mg every other day) 60 Tab 0  clopidogrel (PLAVIX) 75 mg tab TAKE ONE TABLET BY MOUTH DAILY (Patient taking differently: 75 mg daily.) 30 Tab 11  
 insulin degludec (TRESIBA FLEXTOUCH U-100) 100 unit/mL (3 mL) inpn 14 Units by SubCUTAneous route daily.     
 acetaminophen (TYLENOL) 325 mg tablet Take 2 Tabs by mouth every four (4) hours as needed. (Patient taking differently: Take 1,000 mg by mouth every four (4) hours as needed.) 10 Tab 0  
 nitroglycerin (NITROSTAT) 0.4 mg SL tablet 1 Tab by SubLINGual route as needed for Chest Pain. Up to 3 doses. 40 Tab 0  
 polyethylene glycol (MIRALAX) 17 gram packet Take 1 Packet by mouth daily. 1 Each 0  
 levothyroxine (SYNTHROID) 88 mcg tablet Take 88 mcg by mouth Daily (before breakfast).  aspirin delayed-release 81 mg tablet Take 81 mg by mouth daily.  OMEGA-3 FATTY ACIDS/FISH OIL (OMEGA 3 FISH OIL PO) Take 300 mg by mouth daily.  CHOLECALCIFEROL, VITAMIN D3, (VITAMIN D-3 PO) Take 1,000 Units by mouth daily. ALLERGIES Allergies Allergen Reactions  Statins-Hmg-Coa Reductase Inhibitors Other (comments) Intolerant to statins  Sulfa (Sulfonamide Antibiotics) Other (comments)  
  syncope PHYSICAL EXAMINATION Visit Vitals /71 Pulse 83 Temp 98.4 °F (36.9 °C) Resp 18 Ht 5' 1\" (1.549 m) Wt 127 lb (57.6 kg) BMI 24.00 kg/m² Body mass index is 24 kg/m². General: Patient is alert, oriented x 3, not in acute distress, sitting in wheelchair son at bedside HEENT:  
Sclerae are not injected and appear moist. 
There is no alopecia. Cardiovascular: 
Heart is regular rate and rhythm, no murmurs. Chest: 
Lungs are clear to auscultation bilaterally. No rhonchi, wheezes, or crackles. Extremities: 
Free of clubbing, cyanosis, +1 non-pitting tender edema on her lower extremities (RESOLVED) Skin exam: 
There are no rashes, no alopecia, no discoid lesions, no active Raynaud's, no livedo reticularis, no periungual erythema. Post-inflammatory hyperpigmentation from Pustular herpetic lesions from her left lower back dermatomally with anterior involvement Musculoskeletal exam: A comprehensive musculoskeletal exam was performed for all joints of each upper and lower extremity and assessed for swelling, tenderness and range of motion. Positive results are documented as below: 
 
Bilateral knee crepitus without effusion Joint Count 2/24/2020 8/22/2019 5/30/2019 2/18/2019 4/3/2018 12/20/2017 9/28/2017 Patient pain (0-100) 50 5 70 5 15 30 (No Data) MHAQ 0.75 1 2 1.75 1.615 0.75 (No Data) Left elbow - Tender - - - - - - - Left elbow - Swollen - - - - - - - Left wrist- Tender 0 - - - - - 1 Left wrist- Swollen 0 - - - - - - Left 1st MCP - Tender - - - - - - - Left 1st MCP - Swollen - - - - - - 1 Left 2nd MCP - Tender - - - - - - - Left 2nd MCP - Swollen - - - - - - - Left 3rd MCP - Tender - - - - - - 1 Left 3rd MCP - Swollen - - - - - - 1 Left 4th MCP - Tender - - - - - - - Left 4th MCP - Swollen - - - - - - - Left 5th MCP - Tender - - - - - - - Left 5th MCP - Swollen - - - - - - - Left thumb IP - Tender - - - - - - - Left thumb IP - Swollen - - - - - - - Left 2nd PIP - Tender - - - - - - - Left 2nd PIP - Swollen - - - - - - - Left 3rd PIP - Tender - - - - - - - Left 3rd PIP - Swollen - - - - - - - Left 4th PIP - Tender - - - - - - - Left 5th PIP - Tender - - - - - - - Right shoulder - Tender - - - - - - 1 Right elbow - Tender - - - - - - - Right elbow - Swollen - - - - - - - Right wrist- Tender - 1 1 1 - 1 1 Right wrist- Swollen - 1 1 1 1 1 1 Right 1st MCP - Tender - - 1 1 1 - 1 Right 1st MCP - Swollen - - 1 1 1 - 1 Right 2nd MCP - Tender - - 1 1 1 - - Right 2nd MCP - Swollen - - 1 1 - - - Right 3rd MCP - Tender - - 1 1 1 1 1 Right 3rd MCP - Swollen - - 1 1 1 1 1 Right 4th MCP - Tender - - 1 1 1 1 1 Right 4th MCP - Swollen - - 1 1 1 1 1 Right 5th MCP - Tender - - 1 1 1 - 1 Right 5th MCP - Swollen - - - 1 1 - 1 Right thumb IP - Tender - - 1 1 1 - - Right thumb IP - Swollen - - 0 - - - - Right 2nd PIP - Tender - - 1 - 1 - - Right 2nd PIP - Swollen - - 0 - 1 - - Right 3rd PIP - Tender - - 1 - 1 - - Right 3rd PIP - Swollen - - 0 - - - -  
 Right 4th PIP - Tender - - 1 - 1 - 1 Right 4th PIP - Swollen - - 0 - - - - Right 5th PIP - Tender - - 1 - - - 1 Right 5th PIP - Swollen - - 0 - - - - Tender Joint Count (Total) 0 1 11 7 9 3 10 Swollen Joint Count (Total) 0 1 5 6 6 3 7 Physician Assessment (0-10) 0 0.5 3 2 3 2 4 Patient Assessment (0-10) 5 1 7.5 8.5 4.5 2.5 (No Data) CDAI Total (calculated) 5 3.5 26.5 23.5 22.5 10.5 - DATA REVIEW Laboratory Recent laboratory results were reviewed, summarized, and discussed with the patient. Imaging Musculoskeletal Ultrasound None Radiographs Chest 4/16/2018: normal heart size. Mild pulmonary edema is noted. Degenerative changes are seen in the thoracic spine. Calcified granuloma is seen in the right upper lobe. 
  
Chest 2/09/2018: Cardiac silhouette is enlarged. Pulmonary vasculature is not engorged. There are no focal consolidation, effusions, or pneumothorax. Ossified granuloma right upper lobe. Aorta remains atherosclerotic Right shoulder 2/09/2018: no fracture, dislocation or other acute abnormality. Bones are osteopenic. Incidental granuloma right upper lobe Lumbar 2/09/2018: superior endplate compression of L3 with approximately 50% loss of vertebral body height. Superior endplate compression of T12 with approximately one third loss of vertebral body height. Slight compression of T10. Osteopenia. Degenerative changes throughout the spine. Vascular calcifications Chest 9/28/2016: normal heart size. Is atherosclerotic change of the aorta. The lungs are clear acute process. There is calcified granuloma in the right upper lobe with calcified right hilar lymph nodes. There is moderate compression deformity of approximately the T12 vertebral body that appears chronic. There are degenerative changes of the thoracic spine. Bilateral Hand 9/28/2016: LEFT: Bones are osteopenic.  No acute fracture or dislocation. Moderate radiocarpal joint osteoarthritis. Age-appropriate intercarpal and first Aia 16 joint osteoarthritis. There are erosions at the third MCP joint. No other erosion. No other joint space narrowing. No soft tissue calcification.  RIGHT: No acute fracture. Old radius intra-articular fracture has healed. Increased now moderate radiocarpal joint osteoarthritis. Increased mild first MCP joint osteoarthritis. Increased mild-moderate first IP joint osteoarthritis. No joint space erosion or periosteal reaction. Alignment is within normal limits. Osteopenia is unchanged. No soft tissue calcification. Bilateral Foot 9/28/2016: LEFT: Bones are osteopenic. No acute fracture or dislocation. No erosion. Age-appropriate bilateral first MTP joint osteoarthritis. Minimal intertarsal osteoarthritis for age. No periosteal reaction or soft tissue calcification. RIGHT: Bones are osteopenic. No acute fracture or dislocation. No erosion. Age-appropriate bilateral first MTP joint osteoarthritis. Minimal intertarsal osteoarthritis for age. No periosteal reaction or soft tissue calcification. Right Hand 4/10/2010: showed demonstrate osteopenia. Amrita Amabile is an acute T-shaped intra-articular distal radial fracture. Ulna styloid peri like avulsion is also noted. There is soft tissue swelling CT Imaging CT Head, Abdomen, and Pelvis without contrast 4/24/2018: The patient's arms at her sides cause scatter artifact over the examination. Left breast carcinoma: A spiculated left breast mass at 6:00 in the posterior third extends to the dermal-epidermal junction, and probably to the pectoralis muscle. It measures approximately 21 x 32 x 27 mm, and enhanced more avidly than other tissue in the left breast or any tissue in the right breast on prior MRI. There is probably more extensive disease throughout the left breast. Left breast skin thickening is associated.   There is diffuse, mixed lucent and sclerotic, osseous metastatic disease. There are pathologic fractures of T12 and L3. Though no displaced fracture is visible, the degree of tumor replacement of the bilateral sacral wings makes occult or impending pathologic fracture likely. There are multiple bilateral pathologic rib fractures. The posterior cortices of T10, T11, and L1 are largely destroyed. No definite epidural extension on this unenhanced CT. Inferior chest: Moderate bilateral pleural effusions are associated with subtotal collapse of the bilateral lower lobes. Interlobular septal thickening is consistent with interstitial edema. The heart is mildly enlarged, and three-vessel coronary artery calcifications are severe. Aortic valvular calcifications are moderate. Calcified hilar lymph nodes, and splenic and hepatic calcifications, are consistent with old granulomatous disease. Abdomen: Unenhanced CT is neither sensitive nor specific for pyelonephritis, which is manifest on cross-sectional imaging by hazy perfusion defects. No gross perfusion defects on motion limited MRI from 4/20/2018. There is a large left lower pole renal calculus; no definite calyceal dilation upstream. Other bilateral renal calculi are parenchymal and vascular, rather than urinary, in origin. The unenhanced distal esophagus, stomach, duodenum, gallbladder, pancreas, and adrenals are normal. Pelvis: A large stool ball is impacted in the rectum. Surrounding presacral and mesorectal edema suggest stroke or a proctitis. A Alford catheter is in place in the bladder. The unenhanced small bowel and proximal colon are normal. There is trace ascites. No free air or abdominopelvic lymphadenopathy. CT Head without contrast 2/09/2018: There is diffuse age-related parenchymal volume loss. The ventricles and sulci are age-appropriate without hydrocephalus. There is no mass effect or midline shift.  There is no intracranial hemorrhage or extra-axial fluid collection. Scattered foci of low attenuation in the periventricular white matter most likely represent age-indeterminate microvascular ischemic changes. The gray-white matter differentiation is maintained. The basal cisterns are patent. The osseous structures are intact. There is diffuse paranasal sinus opacification with aerated secretions and fluid levels in the maxillary and sphenoid sinuses. The mastoid air cells are clear. MR Imaging MRI Abdomen with and without contrast 4/20/2018: MRCP: No pancreatic or biliary ductal dilation. No stricture or choledocholithiasis. LIVER: Suboptimal postcontrast imaging secondary to motion. 2 subcentimeter T2 hyperintense foci are noted in the right lobe, most likely representing tiny cysts. There are multiple suspicious lesions seen on the diffusion weighted sequences in the right and left hepatic lobes. The largest of these measures approximately 16mm. Many of these have subtle associated T2 hyperintense signal. Metastatic disease is suspected. GALLBLADDER: Contracted. No gallstones. PANCREAS: Normal. SPLEEN: Normal. ADRENALS: Normal. KIDNEYS: Normal. DISTAL ESOPHAGUS: Normal. STOMACH AND DUODENUM: Normal. VISUALIZED SMALL BOWEL AND COLON: Normal. PERITONEUM: No free fluid and no abdominal lymphadenopathy. VISUALIZED LUNG BASES: Small bilateral pleural effusions are noted with associated atelectasis/consolidation in both lower lobes. BONES: Widespread osseous metastases are noted. CHEST WALL: There is a spiculated mass in the central aspect of the left breast measuring 3.5 x 3.2 x 2.8 cm. A smaller discrete mass measuring 0.9 x 1.3 cm seen medially in the left breast. Multiple other areas of concerning areas of enhancement are noted in the left breast, not well assessed on the current study.  
 
MRI Brain with and without contrast 3/19/2018: EXTRA-AXIAL SPACES: Prominent cortical sulci, consistent with cerebral volume loss. Prominence of the sylvian fissures and basal cisterns. INTRACRANIAL HEMORRHAGE: None. VENTRICULAR SYSTEM:  Normal in size and morphology for the patient's age. BASAL CISTERNS:  Normal. CEREBRAL PARENCHYMA:  Extensive scattered and confluent foci of FLAIR/T2 hyperintensity in the cerebral white matter, most likely due to intracranial small vessel disease. No enhancing lesions. No evidence of diffusion restriction. MIDLINE SHIFT: None. CEREBELLUM:  Inferior vermian hypoplasia. Mild volume loss. BRAINSTEM:  Diffusely atrophic. Chronic bilateral pontine lacunar infarcts, right more extensive than left. CALVARIUM:  Multiple scattered T1 hypointense foci in the calvarium, suspicious for metastatic disease. Not identified as destructive lesions on recent head CT. VASCULAR SYSTEM:  Normal flow voids. PARANASAL SINUSES AND MASTOID AIR CELLS:  Clear. VISUALIZED ORBITS: Previous bilateral cataract surgery. VISUALIZED UPPER CERVICAL SPINE:  Normal. SELLA: Normal. SKULL BASE: Normal. No cranial nerve thickening or abnormal enhancement, but some images compromised by patient motion. DXA 
  
DXA 11/23/2016: (L3 for compression fracture and L4 for spondylitic change) lumbar spine L1-L2 T score -1.7 (BMD 0.971 g/cm2), right femoral neck T score: -2.0 (0.757 g/cm2), righttotal hip T score: -1.7 (0.791 g/cm2), and distal one third left radius T score -4.6 (0.472 g/cm2). FRAX score 33.2 % probability in 10 years for major osteoporotic fracture and 21 % 10 year probability of hip fracture. Echocardiogram 
 
Echocardiogram 6/01/2018: LEFT VENTRICLE: Size was normal. Systolic function was moderately to markedly reduced. Ejection fraction was estimated to be 30 %. No obvious wall motion abnormalities identified in the views obtained.  RIGHT VENTRICLE: The size was normal. Systolic function was normal. LEFT ATRIUM: Size was normal. RIGHT ATRIUM: Size was normal. MITRAL VALVE: Normal valve structure. AORTIC VALVE: Normal valve structure. TRICUSPID VALVE: Normal valve structure. PULMONIC VALVE: Not well visualized. AORTA: The root exhibited normal size. PERICARDIUM: A small pericardial effusion was identified circumferential to the heart. There was no evidence of hemodynamic compromise. Echocardiogram 8/23/2013: Left ventricle: Systolic function was normal. Ejection fraction was estimated in the range of 55 % to 60 %. There were no regional wall motion abnormalities. Doppler parameters were consistent with abnormal left ventricular relaxation (grade 1 diastolic dysfunction). Left atrium: The atrium was moderately dilated. Atrial septum: There was a probable patent foramen ovale. Right atrium: The atrium was mildly dilated. Mitral valve: There was mild annular calcification. Aortic valve: The valve was not visualized well enough to rule out a bicuspid morphology. Transaortic velocity was increased due to increased transvalvular flow. Tricuspid valve: There was mild regurgitation. Nuclear Medicine Triple Phase Bone Scan 4/20/2018: There are numerous skeletal foci of activity compatible with metastases. There are multiple in the skull as well as throughout the spine, sternum, ribs, shoulders, pelvis and femora. PATHOLOGY Left breast, core biopsies 4/23/2018: Sclerosed and necrotic papillary lesion with focal areas concerning for invasive carcinoma. Estrogen and Progesterone positive ASSESSMENT AND PLAN This is a follow-up visit for Ms. Ledesma. 1) Seropositive Erosive Nodular Rheumatoid Arthritis. She is on rituximab 1000 mg every 6 months since 6/2019 with improvement. 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 Her CDAI was 5 (previously 3.5, 26.5, 23.5, 22.5, 10.5, 25.5, 29.5, 23, 47, 23) with 0 tender and 0 swollen joints, consistent with low disease activity. I will continue treatment. 2) Long Term Use of Immunosuppressants. The patient remains on immunomodulatory medications (rituximab) and requires frequent toxicity monitoring by blood work. 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 She is taking calcium 1200 mg and 1000 of vitamin D daily. She has completed two months of Forteo and is now on Xgeva due to breast cancer and bony metastasis.   
4) Bilateral Knee Osteoarthritis. This was not an active issue. 5) Asymptomatic Hyperuricemia. Her uric acid was 11.7  mg/dL (previously 8.7, 7.2 mg/dL). There is no history of gout. 6) Vitamin D Deficiency. Her level was 45.1 (previously 67.0, 82.3, 59.4). She is on 1000 daily 7) Elevated ALK. Her ALK 65 (previously 522, 283). This was due to bony metastasis 8) Chronic Kidney Disease Stage 3. She follows with Dr. Rebecca Al. 9) Metastatic Breast Cancer. She follows with Dr. Asia Zheng and is on Arimidex and . 10) Peripheral Neuropathy. This is an active issue and worsened by chemotherapy. The patient voiced understanding of the aforementioned assessment and plan. Summary of plan was provided in the After Visit Summary patient instructions. TODAY'S ORDERS Orders Placed This Encounter  riTUXimab in 0.9% sodium chloride solution Future Appointments Date Time Provider Prasanna Diana 2/25/2020 To Be Determined LATASHA Painter  
2/25/2020 To Be Determined [facility] [address]  
2/27/2020 To Be Determined [facility]  
3/4/2020 To Be Determined LATASHA Painter  
3/11/2020 To Be Determined Devi Terry LPN [address]  
3/18/2020 To Be Determined Devi Terry LPN [address]  
3/25/2020 To Be Determined Taryn Verdugo [address] [facility] [address]  
5/18/2020  2:40 PM Chelsea Rangel  [address]  
8/24/2020  3:00 PM Kathie Carmichael MD [address] [facility] Philly Marion MD, [facility] 
 
 Adult Rheumatology Rheumatology Ultrasound Certified [facility] A Part of [address], [place], [address] Phone [phone] Fax [phone]

## 2020-02-24 NOTE — LETTER
2/24/20 Patient: Velma Peck YOB: 1935 Date of Visit: 2/24/2020 Mandie Merchant MD 
[address] [phone] [id number] VIA In Basket Dear Mandie Merchant MD, Thank you for referring Ms. Robbie Whitten to [facility] for evaluation. My notes for this consultation are attached. If you have questions, please do not hesitate to call me. I look forward to following your patient along with you.  
 
 
Sincerely, 
 
Mari Barkley MD

## 2020-03-17 NOTE — TELEPHONE ENCOUNTER
Requested Prescriptions     Signed Prescriptions Disp Refills    carvediloL (COREG) 12.5 mg tablet 60 Tab 5     Sig: TAKE ONE TABLET BY MOUTH TWICE A DAY     Authorizing Provider: Donald Warner     Ordering User: Maria Joyce Postin verbal orders

## 2020-04-13 NOTE — TELEPHONE ENCOUNTER
Requested Prescriptions     Signed Prescriptions Disp Refills    Repatha SureClick pen injection 2 mL 11     Sig: ADMINISTER 1 ML UNDER THE SKIN EVERY 14 DAYS     Authorizing Provider: Gabe Ford     Ordering User: Jayson Cooper verbal orders

## 2020-04-19 PROBLEM — R65.10 SYSTEMIC INFLAMMATORY RESPONSE SYNDROME (HCC): Status: ACTIVE | Noted: 2020-01-01

## 2020-04-19 PROBLEM — N39.0 UTI (URINARY TRACT INFECTION): Status: ACTIVE | Noted: 2020-01-01

## 2020-04-19 NOTE — H&P
History & Physical 
 
Primary Care Provider: Nimisha Cole MD 
Source of Information: Patient History of Presenting Illness: Rusty Noble is a 82 y.o. female who presents with dysuria and fever 81 y/o female with PMHx of DM, HTN, HLD, CAD, chronic systolic CHF, CVA, rheumatoid arthritis, CKD, colon cancer, GERD, breast cancer, anemia and hypothyroidism, presenting with complaint of dysuria. The patient states that yesterday she began to experience fatigue and generalized weakness, with associated dysuria and urinary urgency. She has not taken any medication for symptoms prior to arrival.  She reports fevers up to 103 and some nausea this morning which has resolved, no vomiting. She additionally reports a mild cough for the past 2 weeks due to the allergy. She denies chest pain, urinary frequency, hematuria, body aches, focal weakness or syncope. She said she always stays home since the [facility] declared emergency. She lives with her sons at home and all family members are ok. No COVID-19 contact history. Pt had E.coli sepsis due to UTI last year. Had several UTI in the past.   
 
Review of Systems: 
General : hpi, no changes of appetite HEENT: no headache, no vision changes, no nose discharge, no hearing changes RES: no wheezing, no cough, no sob CVS: no cp, no palpitation. Muscular: no joint swelling, no muscle pain, no leg swelling Skin: no rash, no itching GI: no vomiting, no diarrhea : no dysuria, no hematuria Hemo: no gum bleeding, no petechial  
Neuro: no sensation changes, no focal weakness Endo: no polydipsia Psych: denied depression Past Medical History:  
Diagnosis Date  Acute on chronic systolic HF (heart failure) ([address] 5/19/2018  Age-related osteoporosis without current pathological fracture 9/2/2009  Anemia 9/2/2009  Asymptomatic hyperuricemia 2/16/2017  Breast cancer ([address]  CAD (coronary artery disease) 6/21/2018  Carotid bruit 8/31/2011  [facility]  CKD (chronic kidney disease) stage 3, GFR 30-59 ml/min ([facility] 10/25/2017  Colon cancer ([facility] 9/2/2009  
 surgery/chemo  [facility] cancer [facility] 9/2/2009  DM (diabetes mellitus) ([facility] 9/2/2009  GERD (gastroesophageal reflux disease)  H/O: CVA 9/2/2009  
 slight l sided weakness  Heart attack ([address]  HTN, goal below 140/90 9/2/2009  Hypothyroid 9/2/2009  Ill-defined condition   
 seasonal allergies  Long-term use of immunosuppressant medication 11/21/2016  Metastatic breast cancer ([facility] 6/1/2018  Microalbuminuria 9/2/2009  Osteoporosis 9/2/2009  Other and unspecified hyperlipidemia 1/27/2010  PAD (peripheral artery disease) ([facility] 9/7/2011  Primary osteoarthritis of both knees 9/28/2016  Rheumatoid arthritis involving ankle ([facility] 9/28/2016  Rheumatoid arthritis(714.0) 9/2/2009  Seropositive rheumatoid arthritis of multiple sites ([facility] 9/28/2016  Statin intolerance 12/21/2016  Type 2 diabetes mellitus with neurologic complication, with long-term current use of insulin ([facility] 9/2/2009  Type 2 diabetes with nephropathy ([facility] 8/20/2018  Unspecified essential hypertension 9/2/2009  Vitamin D deficiency 6/16/2017  Weakness due to cerebrovascular accident Past Surgical History:  
Procedure Laterality Date  HX COLECTOMY  HX HYSTERECTOMY  HX OTHER SURGICAL    
 colonoscopies numerous since 1993 Prior to Admission medications Medication Sig Start Date End Date Taking? Authorizing Provider Repatha SureClick pen injection ADMINISTER 1 ML UNDER THE SKIN EVERY 14 DAYS 4/13/20   Aneesh Fontana III, MD  
carvediloL (COREG) 12.5 mg tablet TAKE ONE TABLET BY MOUTH TWICE A DAY 3/17/20   Aneesh Fontana III, MD  
riTUXimab in 0.9% sodium chloride solution 1,000 mg by IntraVENous route every 6 months. Provider, Historical  
insulin aspart U-100 (NOVOLOG FLEXPEN U-100 INSULIN) 100 unit/mL (3 mL) inpn by SubCUTAneous route. PER SLIDING SCALE    Provider, Historical  
OTHER daily. MOUTH [address] DAILY    Provider, Historical  
multivitamin (ONE A DAY) tablet Take 1 Tab by mouth daily. Provider, Historical  
dextrose (GLUCOSE GEL PO) Take 2 Tabs by mouth as needed for Other (low blood sugar). As needed     Provider, Historical  
furosemide (LASIX) 40 mg tablet Take 2 Tabs by mouth daily. Patient taking differently: Take 40 mg by mouth daily. 40 mg daily alternating with 80 mg every other day 1/6/20   Rosmery Anaya MD  
clopidogrel (PLAVIX) 75 mg tab TAKE ONE TABLET BY MOUTH DAILY Patient taking differently: 75 mg daily. 5/6/19   Ely Larsen MD  
insulin degludec (TRESIBA FLEXTOUCH U-100) 100 unit/mL (3 mL) inpn 14 Units by SubCUTAneous route daily. Provider, Historical  
acetaminophen (TYLENOL) 325 mg tablet Take 2 Tabs by mouth every four (4) hours as needed. Patient taking differently: Take 1,000 mg by mouth every four (4) hours as needed. 6/21/18   Lyla Duarte MD  
nitroglycerin (NITROSTAT) 0.4 mg SL tablet 1 Tab by SubLINGual route as needed for Chest Pain. Up to 3 doses. 6/6/18   Sami Soriano MD  
polyethylene glycol (MIRALAX) 17 gram packet Take 1 Packet by mouth daily. 5/12/18   Charley Álvarez MD  
levothyroxine (SYNTHROID) 88 mcg tablet Take 88 mcg by mouth Daily (before breakfast). Provider, Historical  
aspirin delayed-release 81 mg tablet Take 81 mg by mouth daily. Provider, Historical  
OMEGA-3 FATTY ACIDS/FISH OIL (OMEGA 3 FISH OIL PO) Take 300 mg by mouth daily. Provider, Historical  
CHOLECALCIFEROL, VITAMIN D3, (VITAMIN D-3 PO) Take 1,000 Units by mouth daily. Provider, Historical  
 
Allergies Allergen Reactions  Statins-Hmg-Coa Reductase Inhibitors Other (comments) Intolerant to statins  Sulfa (Sulfonamide Antibiotics) Other (comments)  
  syncope Family History Problem Relation Age of Onset  Diabetes Mother  Stroke Mother  Diabetes Sister  Other Sister   
     fell and hit her head -  of this  Diabetes Brother  Cancer Father   
     stomach  Diabetes Sister SOCIAL HISTORY: 
Patient resides: 
Independently x [facility]isted [facility] SN[facility] With family care Smoking history:  
None x Former Chronic Alcohol history:  
None x Social   
Chronic Ambulates:  
Independently x  
w/cane   
w/walker   
w/wc CODE STATUS: 
DNR Full x Other Objective:  
 
Physical Exam:  
 
Visit Vitals BP 92/53 (BP 1 Location: Right arm, BP Patient Position: At rest) Pulse 86 Temp 98.5 °F (36.9 °C) Resp 18 Ht 5' 1\" (1.549 m) Wt 59.4 kg (131 lb) SpO2 97% BMI 24.75 kg/m² O2 Device: Room air General:  Alert, cooperative, no distress, appears stated age. Head:  Normocephalic, without obvious abnormality, atraumatic. Eyes:  Conjunctivae/corneas clear. PERRL, EOMs intact. Nose: No exam, pt on mask Neck: Supple, symmetrical, trachea midline, no adenopathy, thyroid: no enlargement/tenderness/nodules, no carotid bruit and no JVD. Back:   Symmetric, no curvature. ROM normal. No CVA tenderness. Lungs:   Clear to auscultation bilaterally. Chest wall:  No tenderness or deformity. Heart:  Regular rate and rhythm, S1, S2 normal, no murmur, click, rub or gallop. Abdomen:   Soft, non-tender. Bowel sounds normal. No masses,  No organomegaly. Extremities: Extremities normal, atraumatic, no cyanosis or edema. Pulses: 2+ and symmetric all extremities. Skin: Skin color, texture, turgor normal. No rashes or lesions. Cap refill time normal   
Neurologic: CNII-XII intact. No focal weakness Data Review:  
 
Recent Days: 
Recent Labs 20 
1038 WBC 27.1* HGB 10.1* HCT 31.4*  
* Recent Labs 04/19/20 
1038   
K 3.7  CO2 25 * BUN 37* CREA 1.40* CA 8.4* No results for input(s): PH, PCO2, PO2, HCO3, FIO2 in the last 72 hours. 24 Hour Results: 
Recent Results (from the past 24 hour(s)) CBC WITH AUTOMATED DIFF Collection Time: 04/19/20 10:38 AM  
Result Value Ref Range WBC 27.1 (H) 3.6 - 11.0 K/uL  
 RBC 3.38 (L) 3.80 - 5.20 M/uL  
 HGB 10.1 (L) 11.5 - 16.0 g/dL HCT 31.4 (L) 35.0 - 47.0 % MCV 92.9 80.0 - 99.0 FL  
 MCH 29.9 26.0 - 34.0 PG  
 MCHC 32.2 30.0 - 36.5 g/dL  
 RDW 19.7 (H) 11.5 - 14.5 % PLATELET 643 (L) 569 - 400 K/uL MPV 10.8 8.9 - 12.9 FL  
 NRBC 0.1 (H) 0  WBC ABSOLUTE NRBC 0.02 (H) 0.00 - 0.01 K/uL NEUTROPHILS 88 (H) 32 - 75 % LYMPHOCYTES 2 (L) 12 - 49 % MONOCYTES 8 5 - 13 % EOSINOPHILS 0 0 - 7 % BASOPHILS 1 0 - 1 % IMMATURE GRANULOCYTES 1 (H) 0.0 - 0.5 % ABS. NEUTROPHILS 23.8 (H) 1.8 - 8.0 K/UL  
 ABS. LYMPHOCYTES 0.5 (L) 0.8 - 3.5 K/UL  
 ABS. MONOCYTES 2.2 (H) 0.0 - 1.0 K/UL  
 ABS. EOSINOPHILS 0.0 0.0 - 0.4 K/UL  
 ABS. BASOPHILS 0.3 (H) 0.0 - 0.1 K/UL  
 ABS. IMM. GRANS. 0.3 (H) 0.00 - 0.04 K/UL  
 DF SMEAR SCANNED    
 RBC COMMENTS ANISOCYTOSIS 1+ 
    
 RBC COMMENTS TEARDROP CELLS 1+ RBC COMMENTS OVALOCYTES 
FEW METABOLIC PANEL, BASIC Collection Time: 04/19/20 10:38 AM  
Result Value Ref Range Sodium 136 136 - 145 mmol/L Potassium 3.7 3.5 - 5.1 mmol/L Chloride 103 97 - 108 mmol/L  
 CO2 25 21 - 32 mmol/L Anion gap 8 5 - 15 mmol/L Glucose 238 (H) 65 - 100 mg/dL BUN 37 (H) 6 - 20 MG/DL Creatinine 1.40 (H) 0.55 - 1.02 MG/DL  
 BUN/Creatinine ratio 26 (H) 12 - 20 GFR est AA 44 (L) >60 ml/min/1.73m2 GFR est non-AA 36 (L) >60 ml/min/1.73m2 Calcium 8.4 (L) 8.5 - 10.1 MG/DL  
SAMPLES BEING HELD Collection Time: 04/19/20 10:38 AM  
Result Value Ref Range SAMPLES BEING HELD [id number] COMMENT Add-on orders for these samples will be processed based on acceptable specimen integrity and analyte stability, which may vary by analyte. LACTIC ACID Collection Time: 04/19/20 10:38 AM  
Result Value Ref Range Lactic acid 1.4 0.4 - 2.0 MMOL/L  
URINALYSIS W/MICROSCOPIC Collection Time: 04/19/20 11:12 AM  
Result Value Ref Range Color YELLOW/STRAW Appearance TURBID (A) CLEAR Specific gravity 1.009 1.003 - 1.030    
 pH (UA) 5.5 5.0 - 8.0 Protein 30 (A) NEG mg/dL Glucose Negative NEG mg/dL Ketone Negative NEG mg/dL Bilirubin Negative NEG Blood SMALL (A) NEG Urobilinogen 0.2 0.2 - 1.0 EU/dL Nitrites Negative NEG Leukocyte Esterase LARGE (A) NEG    
 WBC >100 (H) 0 - 4 /hpf  
 RBC 5-10 0 - 5 /hpf Epithelial cells FEW FEW /lpf Bacteria 2+ (A) NEG /hpf Hyaline cast 2-5 0 - 5 /lpf URINE CULTURE HOLD SAMPLE Collection Time: 04/19/20 11:12 AM  
Result Value Ref Range Urine culture hold Urine on hold in Microbiology dept for 2 days. If unpreserved urine is submitted, it cannot be used for addtional testing after 24 hours, recollection will be required. Imaging: Xr Chest Pa Lat Result Date: 4/19/2020 Impression: Persistent mild diffuse interstitial prominence again noted and grossly unchanged compared to 12/31/2019. Stable osseous metastatic disease. Assessment:  
 
Active Problems: 
  UTI (urinary tract infection) (4/19/2020) Systemic inflammatory response syndrome ([address] (4/19/2020) Plan: 1. Early sepsis: with severe leukocytosis, fever, borderline low BP, likely source from UTI. Had severe septic shock due to E.coli end of last year. Sepsis reassessment completed. Received NS  Bolus. Currently bp stable. per son and previous hospital history, pt is a high risk pt to develop flash pulmonary edema in hospital when she is receiving IVF during her sepsis treatment. I will give NS at 50cc/hr FOR 10 hours, if BP normal in another 4-6 hours, may stop IVF and give 20mg of lasix iv based on her BP. 2. UTI: continue rocephin, following blood culture nd urine culture. 3. Chronic systolic congestive heart failure: hold lasix now due to concerning of sepsis . May restart lasix in am if fever resolved and BP normal. May give 20mg of lasix at night if bp elevated. Consult cardiologist.  
4. CAD: continue ASA ,plavix. Will hold coreg for now due to low bp. May restart in am if bp normal . 5. Metastatic breast cancer: follow oncologist Dr. Aisha Kamara, receiving chemo in infusion center. Per son she received one dose of Neulasta last week post chemo, not sure her current severe leukocytosis is part from this. Will consult her oncologist.  
6. CKD 3: monitor cr Son Clay Rojas called and updated. Signed By: Mateo Cooper MD   
 April 19, 2020

## 2020-04-19 NOTE — ED NOTES
TRANSFER - OUT REPORT: 
 
Verbal report given to [facility], RN (name) on Pal Leone  being transferred to [facility](unit) for routine progression of care Report consisted of patients Situation, Background, Assessment and  
Recommendations(SBAR). Information from the following report(s) SBAR, Kardex, ED Summary, Intake/Output, MAR and Recent Results was reviewed with the receiving nurse. Lines:  
Peripheral IV 04/19/20 Right Antecubital (Active) Site Assessment Clean, dry, & intact 4/19/2020 10:40 AM  
Phlebitis Assessment 0 4/19/2020 10:40 AM  
Infiltration Assessment 0 4/19/2020 10:40 AM  
Dressing Status Clean, dry, & intact 4/19/2020 10:40 AM  
Dressing Type Transparent 4/19/2020 10:40 AM  
Hub Color/Line Status Pink 4/19/2020 10:40 AM  
Action Taken Blood drawn 4/19/2020 10:40 AM  
   
Peripheral IV 04/19/20 Left Antecubital (Active) Site Assessment Clean, dry, & intact 4/19/2020 10:41 AM  
Phlebitis Assessment 0 4/19/2020 10:41 AM  
Dressing Status Clean, dry, & intact 4/19/2020 10:41 AM  
Dressing Type Transparent 4/19/2020 10:41 AM  
Hub Color/Line Status Pink 4/19/2020 10:41 AM  
Action Taken Blood drawn 4/19/2020 10:41 AM  
  
 
Opportunity for questions and clarification was provided. Patient transported with: 
 Registered Nurse

## 2020-04-19 NOTE — ROUTINE PROCESS
1500: TRANSFER - IN REPORT: 
 
Verbal report received from Anh Green RN(name) on Evelina Kamara  being received from ED(unit) for routine post - op Report consisted of patients Situation, Background, Assessment and  
Recommendations(SBAR). Information from the following report(s) SBAR, Kardex, ED Summary, Procedure Summary and Cardiac Rhythm nsr was reviewed with the receiving nurse. Opportunity for questions and clarification was provided. Assessment completed upon patients arrival to unit and care assumed. Primary Nurse Matilde Man RN and Woodrow Jiménez RN performed a dual skin assessment on this patient No impairment noted Calderon score is 18

## 2020-04-19 NOTE — ED TRIAGE NOTES
Patient arrives c/o dysuria, increased fatigue since yesterday. Patients temperature today was 102/103 before arrival. Denies taking any medication.

## 2020-04-19 NOTE — ED PROVIDER NOTES
81 y/o female with PMHx of DM, HTN, HLD, CAD, CHF, CVA, rheumatoid arthritis, CKD, colon cancer, GERD, breast cancer, anemia and hypothyroidism, presenting with complaint of dysuria. The patient states that yesterday she began to experience fatigue and generalized weakness, with associated dysuria and urinary urgency. She has not taken any medication for symptoms prior to arrival.  She reports fevers up to 103 and some nausea this morning which has resolved, no vomiting. She additionally reports a mild cough for the past 2 weeks with associated shortness of breath. She denies chest pain, urinary frequency, hematuria, body aches, focal weakness or syncope. The history is provided by the patient. Past Medical History:  
Diagnosis Date  Acute on chronic systolic HF (heart failure) ([address] 5/19/2018  Age-related osteoporosis without current pathological fracture 9/2/2009  Anemia 9/2/2009  Asymptomatic hyperuricemia 2/16/2017  Breast cancer ([address]  CAD (coronary artery disease) 6/21/2018  Carotid bruit 8/31/2011  CHF (congestive heart failure) ([address]  CKD (chronic kidney disease) stage 3, GFR 30-59 ml/min ([facility] 10/25/2017  Colon cancer ([address] 9/2/2009  
 surgery/chemo  Colon cancer ([address] 9/2/2009  DM (diabetes mellitus) ([address] 9/2/2009  GERD (gastroesophageal reflux disease)  H/O: CVA 9/2/2009  
 slight l sided weakness  Heart attack ([address]  HTN, goal below 140/90 9/2/2009  Hypothyroid 9/2/2009  Ill-defined condition   
 seasonal allergies  Long-term use of immunosuppressant medication 11/21/2016  Metastatic breast cancer ([address] 6/1/2018  Microalbuminuria 9/2/2009  Osteoporosis 9/2/2009  Other and unspecified hyperlipidemia 1/27/2010  PAD (peripheral artery disease) ([address] 9/7/2011  Primary osteoarthritis of both knees 9/28/2016  Rheumatoid arthritis involving ankle ([address] 9/28/2016  Rheumatoid arthritis(714.0) 9/2/2009  Seropositive rheumatoid arthritis of multiple sites ([facility] 2016  Statin intolerance 2016  Type 2 diabetes mellitus with neurologic complication, with long-term current use of insulin ([facility] 2009  Type 2 diabetes with nephropathy ([facility] 2018  Unspecified essential hypertension 2009  Vitamin D deficiency 2017  Weakness due to cerebrovascular accident Past Surgical History:  
Procedure Laterality Date  HX COLECTOMY  HX HYSTERECTOMY  HX OTHER SURGICAL    
 colonoscopies numerous since  Family History:  
Problem Relation Age of Onset  Diabetes Mother  Stroke Mother  Diabetes Sister  Other Sister   
     fell and hit her head -  of this  Diabetes Brother  Cancer Father   
     stomach  Diabetes Sister Social History Socioeconomic History  Marital status:  Spouse name: Not on file  Number of children: Not on file  Years of education: Not on file  Highest education level: Not on file Occupational History  Not on file Social Needs  Financial resource strain: Not on file  Food insecurity Worry: Not on file Inability: Not on file  Transportation needs Medical: Not on file Non-medical: Not on file Tobacco Use  Smoking status: Never Smoker  Smokeless tobacco: Never Used Substance and Sexual Activity  Alcohol use: No  
 Drug use: No  
 Sexual activity: Not Currently Partners: Male Lifestyle  Physical activity Days per week: Not on file Minutes per session: Not on file  Stress: Not on file Relationships  Social connections Talks on phone: Not on file Gets together: Not on file Attends [employer] service: Not on file Active member of club or organization: Not on file Attends meetings of clubs or organizations: Not on file Relationship status: Not on file  Intimate partner violence Fear of current or ex partner: Not on file Emotionally abused: Not on file Physically abused: Not on file Forced sexual activity: Not on file Other Topics Concern  Not on file Social History Narrative  Not on file ALLERGIES: Statins-hmg-coa reductase inhibitors and Sulfa (sulfonamide antibiotics) Review of Systems Constitutional: Positive for fatigue and fever. HENT: Negative for congestion. Respiratory: Positive for cough and shortness of breath. Cardiovascular: Negative for chest pain. Gastrointestinal: Positive for abdominal pain (suprapubic) and nausea. Negative for vomiting. Genitourinary: Positive for dysuria and urgency. Negative for frequency and hematuria. Musculoskeletal: Negative for myalgias. Neurological: Positive for weakness (generalized). Negative for syncope. All other systems reviewed and are negative. Vitals:  
 04/19/20 1008 BP: 106/58 Pulse: (!) 102 Resp: 18 Temp: (!) 101.5 °F (38.6 °C) SpO2: 100% Weight: 59.4 kg (131 lb) Height: 5' 1\" (1.549 m) Physical Exam 
Vitals signs and nursing note reviewed. Constitutional:   
   General: She is not in acute distress. Appearance: She is well-developed. She is not diaphoretic. HENT:  
   Head: Normocephalic and atraumatic. Eyes:  
   Conjunctiva/sclera: Conjunctivae normal.  
Neck: Musculoskeletal: Normal range of motion and neck supple. Cardiovascular:  
   Rate and Rhythm: Normal rate and regular rhythm. Heart sounds: Murmur present. Pulmonary:  
   Effort: Pulmonary effort is normal.  
   Breath sounds: Normal breath sounds. Abdominal:  
   General: There is no distension. Palpations: Abdomen is soft. Tenderness: There is abdominal tenderness (mild RLQ, LLQ). There is no guarding or rebound. Skin: 
   General: Skin is warm and dry. Neurological:  
   Mental Status: She is alert and oriented to person, place, and time. MDM 
Number of Diagnoses or Management Options Fever, unspecified fever cause:  
Leukocytosis, unspecified type:  
Urinary tract infection without hematuria, site unspecified:  
  
Amount and/or Complexity of Data Reviewed Clinical lab tests: ordered and reviewed Tests in the radiology section of CPT®: ordered and reviewed Independent visualization of images, tracings, or specimens: yes (CXR) Patient Progress Patient progress: stable Procedures 81 y/o female with PMHx of DM, HTN, HLD, CAD, CHF, CVA, rheumatoid arthritis, CKD, colon cancer, GERD, breast cancer, anemia and hypothyroidism, presenting with complaint of dysuria. History and exam concerning for sepsis due to UTI. Febrile to 101.5 on arrival, improved to 98.5 after tylenol. UA consistent with UTI, urine culture added. Labs reveal leukocytosis of 27.1, baseline renal function. CXR, read by radiology and independently visualized and interpreted by myself, reveals no evidence of pneumonia or other acute cardiopulmonary abnormalities. Patient given ceftriaxone, 500 ml fluid bolus (will be cautious with fluids due to CHF with history of flash pulmonary edema). Consult Note - 2:10 PM 
I have spoken with Dr. Mary Blue. Discussed patient's presentation, history, physical assessment, and available diagnostic results. She will write orders and admit the patient to the hospital. All available results have been reviewed with the patient and any available family. Care plan has been outlined and questions have been answered. Patient and any available family understands and agrees to need for admission to hospital for further treatment not available in ED. Patient is ready for admission.

## 2020-04-20 NOTE — CONSULTS
[address] Name:  Shirley Ramirez 
MR#:  [id number] :  1935 ACCOUNT #:  [de-identified] DATE OF SERVICE:  2020 HISTORY OF PRESENT ILLNESS:  The patient is an 63-year-old woman with a history of metastatic breast cancer, cared for at [facility], who is currently receiving chemotherapy. She is seen after her [facility] on  with dysuria and fever. This patient had a short prodrome of dysuria followed by elevated temperature to 103 on the day of admission. She lives at home with her family. She was admitted to [facility] with these symptoms on , she was found to have pyuria and ultimately blood cultures grew gram-negative rods. She has a complicated medical history with congestive heart failure, metastatic breast cancer, rheumatoid arthritis. Over the course of the past 24 hours, she has had improvement in her symptoms. PAST MEDICAL HISTORY:  Her past medical history includes metastatic ER-positive, HER-2 negative breast cancer diagnosed in 2018. She was treated with Arimidex plus Ibrance between 2018 and 2019 when she had disease progression, she was switched to chemotherapy with Abraxane and Xeloda in 2019 and has had a documented response to treatment. Her last chemotherapy cycle was given on . Past medical history also includes congestive heart failure, pneumonia, chronic kidney disease, rheumatoid arthritis for which she receives Rituxan. She has diabetes, GERD, history of stroke with left-sided weakness, hypothyroidism, osteoporosis, hypertension. She had urosepsis with decompensated congestive heart failure in 2019. PAST SURGICAL HISTORY:  Includes hemicolectomy, hysterectomy, breast biopsy. She has had colonoscopy. SOCIAL HISTORY:  She is . She is originally from [facility]. She does not smoke or drink. She lives with her family here in [place]. ALLERGIES:  STATINS AND SULFA. FAMILY HISTORY:  Father had stomach cancer. REVIEW OF SYSTEMS:  As noted above, otherwise noncontributory. PHYSICAL EXAMINATION: 
GENERAL:  She is a pleasant, frail elderly woman, in no apparent distress. VITAL SIGNS:  Temperature 98, /72, respirations 20, O2 sat 100% on room air. HEENT:  Alopecia is present. EOMI. Nonicteric sclerae. She has a surgical mask placed. NECK:  Supple, full range of motion. SKIN:  No petechiae or bruising noted. NEUROLOGIC:  Grossly nonfocal. 
ABDOMEN:  Soft. EXTREMITIES:  No edema. NEUROLOGIC:  A and O x3, nonfocal. 
 
LABORATORY DATA:  Her white blood cell count was 30,000 today, hemoglobin 9.7, platelet count 418. BUN and creatinine are 39 and 1.4. Albumin 3.5 upon admission. IMPRESSION: 
1.  Gram-negative bacteremia secondary to urosepsis. The patient has had problems with  E-coli urosepsis in December 2019 requiring admission. She is on broad-spectrum antibiotics with cefepime and appears to be responding. She is hemodynamically stable. She was given Neulasta in early April after receiving chemotherapy. 2.  Metastatic breast cancer. The patient has had problems with liver lesions as well as lymphadenopathy in the chest and axilla. She has a left breast mass which is also palpable. All these lesions have improved since going on chemotherapy. She is due for her next dose on 04/29, we will hold that treatment while she is in the hospital and determine if we make any adjustments after she is discharged. 3.  Anemia. The patient receives Procrit in the office, her anemia is secondary to both chemotherapy as well as a chronic kidney disease. She should continue that while she is here. 4.  Thrombocytopenia. This is mild secondary to chemotherapy. 5.  Protein calorie malnutrition.   We should ask the nutrition staff to see her while she is in the hospital and consider giving her appropriate supplements. We will follow with you on behalf of [place]. Ivonne Zamora MD 
 
 
[id number] 
D:  04/20/2020 16:08 
T:  04/20/2020 16:59 
JOB #:  [id number]

## 2020-04-20 NOTE — PROGRESS NOTES
0730 Bedside and Verbal shift change report given to Menan forest (oncoming nurse) by Marcelina Carvajal (offgoing nurse). Report included the following information SBAR, Kardex, ED Summary, Procedure Summary, Intake/Output, MAR, Accordion and Recent Results.

## 2020-04-20 NOTE — CONSULTS
83 y/o with met breast cancer under our care at [facility] is seen after admission for fever and sob. .. Pt examined, chart reviewed,,,  
Consult dictated Katrina Palmer MD

## 2020-04-20 NOTE — PROGRESS NOTES
Problem: Heart Failure: Day 2 Goal: Treatments/Interventions/Procedures Outcome: Progressing Towards Goal 
Patient started on Lasix. No s/s of pulmonary edema. Fluids were d/c. Problem: Falls - Risk of 
Goal: *Absence of Falls Outcome: Progressing Towards Goal 
Pt remains free of falls during admission. Call bell and frequently used items within reach. Bedside table within reach. Pt provided nonskid socks and instructed to call out for nurse when in need of asssitance 0730 Bedside shift change report given to Lyubov Boucher (oncoming nurse) by Chang Lockett (offgoing nurse).  Report included the following information SBAR, Kardex, Intake/Output, MAR, Recent Results and Cardiac Rhythm NSr.

## 2020-04-20 NOTE — PROGRESS NOTES
CM contacted pt to discuss CM role and to assess pt needs. Pt requested for CM to contact for son, Rocky Mayfield (ph#: [phone]). CM contacted son.Rocky; son requested for Cm to return call at 0930. Belkys Forrester RN, BSN Care Management Department 1353: CM unable to contact son at requested time. CM attempted to contact; no answer-VM left for return call. Belkys Forrester RN, BSN- Care Management

## 2020-04-20 NOTE — PROGRESS NOTES
Hospitalist Progress Note Jose Luis Lopez MD 
Answering service: [phone] OR [phone] from in house phone Date of Service:  2020 NAME:  Rahul Duncan :  1935 MRN:  [id number] Admission Summary:  
85F p/w UTI and sepsis/Bacteremia Interval history / Subjective:  
Patient seen and examined at bedside, feels much better, no fever now, no n/v. Appetite ok. Hx revisited, only sick since yesterday morning. No sob/resp symptoms. Assessment & Plan: #. UTI: UA suggestive, Urine culture pending. Empiric iv abx. #. Bacteremia: likely 2nd to above. BCs growing GNR #. Sepsis: POA, with leucocytosis and fever. Lactic wnl, Consult ID- will likely need iv abx for dc. #. mild renal insufficiency: on CKD. Monitor, avoid nephrotoxins #. HypoKalemia: Acute, replace, monitor #. HypoNatremia: mild, monitor #. CAD: stable, home regimen, monitor #. Systolic CHF: chronic, no acute decompensation. #. Metastatic breast Ca: on chemo, recent Neulasta given, Oncology Consult pending Code status: Full DVT prophylaxis: SCD Care Plan discussed with: Patient/Family and Nurse Disposition: TBD Hospital Problems  Date Reviewed: 2020 Codes Class Noted POA  
 UTI (urinary tract infection) ICD-10-CM: N39.0 ICD-9-CM: 599.0  2020 Unknown Systemic inflammatory response syndrome (HCC) ICD-10-CM: R65.10 ICD-9-CM: 995.90  2020 Unknown Review of Systems:  
Pertinent items are mentioned in interval history. Vital Signs:  
 Last 24hrs VS reviewed since prior progress note. Most recent are: 
Visit Vitals /66 (BP 1 Location: Right arm, BP Patient Position: At rest) Pulse 91 Temp 98.8 °F (37.1 °C) Resp 20 Ht 5' 1\" (1.549 m) Wt 60.9 kg (134 lb 4.2 oz) SpO2 100% BMI 25.37 kg/m² Intake/Output Summary (Last 24 hours) at 2020 6447 Last data filed at 2020 8342 Gross per 24 hour Intake 1349.17 ml Output 200 ml Net 1149.17 ml Physical Examination:  
General:  Alert, oriented, No acute distress, pleasant [facility] lady Resp:  No accessory muscle use, Good AE, no wheezes, no rhonchi Abd:  Soft, non-tender, non-distended, BS+ Extremities:  No cyanosis or clubbing, no significant edema Neuro:  Grossly normal, no focal neuro deficits, follows commands Psych:  Good insight, AAOx3, not agitated. Data Review:  
 Review and/or order of clinical lab test 
Review and/or order of tests in the radiology section of [facility] Review and/or order of tests in the medicine section of [facility] Labs:  
 
Recent Labs  
  04/20/20 
0423 04/19/20 
1038 WBC 30.5* 27.1* HGB 9.7* 10.1* HCT 31.0* 31.4*  
* 115* Recent Labs  
  04/20/20 
0625 04/20/20 
0423 04/19/20 
1038  133* 136  
K 3.4* 3.9 3.7  105 103 CO2 22 QUANTITY NOT SUFFICIENT. SUGGEST RECOLLECTION 25 BUN 39* 40* 37* CREA 1.40* 1.48* 1.40* * 214* 238* CA 8.6 8.4* 8.4* MG 1.9 QUANTITY NOT SUFFICIENT. SUGGEST RECOLLECTION  --   
PHOS 2.9 QUANTITY NOT SUFFICIENT. SUGGEST RECOLLECTION  --   
 
Recent Labs  
  04/20/20 0423 SGOT 35 ALT 31  
* TBILI 0.7 TP 6.4 ALB 2.5*  
GLOB 3.9 No results for input(s): INR, PTP, APTT, INREXT in the last 72 hours. No results for input(s): FE, TIBC, PSAT, FERR in the last 72 hours. No results found for: FOL, RBCF No results for input(s): PH, PCO2, PO2 in the last 72 hours. No results for input(s): CPK, CKNDX, TROIQ in the last 72 hours. No lab exists for component: CPKMB Lab Results Component Value Date/Time Cholesterol, total 74 04/16/2018 04:21 AM  
 HDL Cholesterol 25 04/16/2018 04:21 AM  
 LDL, calculated 31.6 04/16/2018 04:21 AM  
 Triglyceride 87 04/16/2018 04:21 AM  
 CHOL/HDL Ratio 3.0 04/16/2018 04:21 AM  
 
Lab Results Component Value Date/Time  Glucose (POC) 170 (H) 04/20/2020 08:08 AM  
 Glucose (POC) 238 (H) 04/19/2020 09:12 PM  
 Glucose (POC) 323 (H) 04/19/2020 05:08 PM  
 Glucose (POC) 273 (H) 01/06/2020 11:35 AM  
 Glucose (POC) 105 (H) 01/06/2020 06:14 AM  
 
Lab Results Component Value Date/Time Color YELLOW/STRAW 04/19/2020 11:12 AM  
 Appearance TURBID (A) 04/19/2020 11:12 AM  
 Specific gravity 1.009 04/19/2020 11:12 AM  
 pH (UA) 5.5 04/19/2020 11:12 AM  
 Protein 30 (A) 04/19/2020 11:12 AM  
 Glucose Negative 04/19/2020 11:12 AM  
 Ketone Negative 04/19/2020 11:12 AM  
 Bilirubin Negative 04/19/2020 11:12 AM  
 Urobilinogen 0.2 04/19/2020 11:12 AM  
 Nitrites Negative 04/19/2020 11:12 AM  
 Leukocyte Esterase LARGE (A) 04/19/2020 11:12 AM  
 Epithelial cells FEW 04/19/2020 11:12 AM  
 Bacteria 2+ (A) 04/19/2020 11:12 AM  
 WBC >100 (H) 04/19/2020 11:12 AM  
 RBC 5-10 04/19/2020 11:12 AM  
 
Medications Reviewed:  
 
Current Facility-Administered Medications Medication Dose Route Frequency  sodium chloride (NS) flush 5-10 mL  5-10 mL IntraVENous PRN  
 aspirin delayed-release tablet 81 mg  81 mg Oral DAILY  clopidogreL (PLAVIX) tablet 75 mg  75 mg Oral DAILY  levothyroxine (SYNTHROID) tablet 88 mcg  88 mcg Oral ACB  polyethylene glycol (MIRALAX) packet 17 g  17 g Oral DAILY  glucose chewable tablet 16 g  4 Tab Oral PRN  
 glucagon (GLUCAGEN) injection 1 mg  1 mg IntraMUSCular PRN  
 sodium chloride (NS) flush 5-40 mL  5-40 mL IntraVENous Q8H  
 sodium chloride (NS) flush 5-40 mL  5-40 mL IntraVENous PRN  
 naloxone (NARCAN) injection 0.4 mg  0.4 mg IntraVENous PRN  
 dextrose 10% infusion 0-250 mL  0-250 mL IntraVENous PRN  
 insulin glargine (LANTUS) injection 14 Units  14 Units SubCUTAneous QHS  insulin lispro (HUMALOG) injection   SubCUTAneous AC&HS  ondansetron (ZOFRAN) injection 4 mg  4 mg IntraVENous Q4H PRN  
 acetaminophen (TYLENOL) tablet 650 mg  650 mg Oral Q4H PRN  
 cefTRIAXone (ROCEPHIN) 2 g in 0.9% sodium chloride (MBP/ADV) 50 mL  2 g IntraVENous Q24H  
 furosemide (LASIX) injection 20 mg  20 mg IntraVENous DAILY  
______________________________________________________________________ EXPECTED LENGTH OF STAY: - - - 
ACTUAL LENGTH OF STAY:          1 Flores Mckeon, MD

## 2020-04-20 NOTE — CONSULTS
Cardiology Consult Note Patient Name: Hema Aviles  : 1935 MRN: [id number] Date: 2020  Time: 7:32 AM 
 
Admit Diagnosis: Systemic inflammatory response syndrome ([facility] [address] [facility] [R65.10] UTI (urinary tract infection) [N39.0] Primary Cardiologist: Tima Acevedo MD   Consulting Cardiologist: Darwin Wood. Aubrie Johnson M.D.  
 
Nimo Reeves for Consult: CHF Requesting MD: Dr. Lisbeth Reynolds MD 
 
HPI: 
Hema Aviles is a 82 y.o. female admitted on 2020  for Systemic inflammatory response syndrome ([facility] [R65.10] UTI (urinary tract infection) [N39.0]. She has PMH of MI, extensive multivessel CAD (medical management), HTN, HLD, chronic systolic CHF (EF 83-25%, mod MR by echo 2019), statin intolerance, PAD, DM, CVA, RA, CKD, metastatic breast cancer (undergoing chemotherapy), hypothyroid, ecoli sepsis last year, GERD. She presents with chief c/o fatigue, generalized weakness, dysuria and urgency. She also noted fevers of 102 at home thus prompting her visit to ER. She reported mild cough x 2 weeks which she attributes to allergies which she says she has every year at this time. She reports that she has had some mild SOB at times but SOB is at baseline. She is able to walk short distances without SOB, has occasional mild orthopnea. She denies chest pain, dizziness, palpitations, syncope. Denies BLE edema She has marked and worsening leukocytosis. UA abnormal and Urine culture pending, She is being treated for UTI. CXR revealed persistent mild diffuse prominence but unchanged since 2019. Given patient's history of chronic systolic CHF, cardiology has been consulted. Subjective: Denies chest pain, SOB, orthopnea, dizziness currently. Reports dysuria has resolved. States she feels better. Assessment and Plan 1. Chronic systolic CHF: EF 24-40% by TTE 2019, no need to repeat echo. -Appears compensated.  NYHA III  
 -On coreg 12.5 mg BID PTA (held here d/t low BP yesterday) -Lasix 20 mg IV daily here. (on Lasix 40 mg every other day, alternating with 80 mg every other day po) -CXR with no evidence of overt pulmonary edema 2. CAD: extensive (see below) -Continue Plavix, ASA.   
 -Statin intolerant, on Repatha PTA 3. HTN: bp low- NL 
 -Currently off home coreg, consider restarting at reduced dose- will d/w Dr. Lucius Chiu 
 
4. Mild- mod AS by echo 12/25/19 5. Fever, leukocytosis, likely UTI:  UA+, WBC rising. 
 -defer to primary team 
 
6. DM 
7. Metastatic breast CA 
8. Hypokaleia: K=3.4.  
 -Repleted 82  Y. O. female with PMH which includes extensive CAD (treated medically),  Chronic systolic DHF, presents with UTI symptoms, +UA and leukocytosis, fever. Systolic CHF appears compensated. Recommend change diuretic from IV back to home dosing. No further cardiac testing needed. Saw and evaluated pt and agree with above assessment and plan. Compensated and ok for hospital dc from cardiac standpoint. Will sign off. Outpt f/u with Dr. Maxx Fine. Chelsea Mack MD 
 
Cath 5/2018: prox LAD 90%, Distal LAD 50%, ostial Circ 85%, mid Cx 70% Distal RCA 90%, prox RCA 88%, 1st OM 75%, Rt PDA 99%) 6/18 echo lvef 30%, trivial pericardial effusion 12/25/19 mildly dilated lv with lvef 30-35%, lae, mod mr, mild to mod as 
 
Review of Symptoms: 
Review of Symptoms: 
Constitutional: +fevers on presentation, +fatigue on presentation. HEET: No hearing loss. No sore throat.+ post nasal drip Neck: No dysphagia Heart: No chest pain or palpitations. Lungs: No shortness of breath currently, +cough. Abdomen: No pain. No nausea of vomiting. No BRBPR, occasional black stool but takes iron tablets Urology: + dysuria on admission. Ext: No edema. Skin: No acute rashes or ulcers. Endocrine: No heat or cold intolerance. Neuro: No transient neurologic deficit Previous treatment/evaluation includes echocardiogram and cardiac catheterization . Cardiac risk factors: diabetes mellitus, sedentary life style, hypertension, post-menopausal. 
 
Past Medical History:  
Diagnosis Date  Acute on chronic systolic HF (heart failure) ([address] 5/19/2018  Age-related osteoporosis without current pathological fracture 9/2/2009  Anemia 9/2/2009  Asymptomatic hyperuricemia 2/16/2017  Breast cancer ([address]  CAD (coronary artery disease) 6/21/2018  Carotid bruit 8/31/2011  CHF (congestive heart failure) ([address]  CKD (chronic kidney disease) stage 3, GFR 30-59 ml/min ([facility]) 10/25/2017  Colon cancer ([address] 9/2/2009  
 surgery/chemo  Colon cancer ([address] 9/2/2009  DM (diabetes mellitus) ([address] 9/2/2009  GERD (gastroesophageal reflux disease)  H/O: CVA 9/2/2009  
 slight l sided weakness  Heart attack ([address]  HTN, goal below 140/90 9/2/2009  Hypothyroid 9/2/2009  Ill-defined condition   
 seasonal allergies  Long-term use of immunosuppressant medication 11/21/2016  Metastatic breast cancer ([address] 6/1/2018  Microalbuminuria 9/2/2009  Osteoporosis 9/2/2009  Other and unspecified hyperlipidemia 1/27/2010  PAD (peripheral artery disease) ([address] 9/7/2011  Primary osteoarthritis of both knees 9/28/2016  Rheumatoid arthritis involving ankle ([address] 9/28/2016  Rheumatoid arthritis(714.0) 9/2/2009  Seropositive rheumatoid arthritis of multiple sites ([address] 9/28/2016  Statin intolerance 12/21/2016  Type 2 diabetes mellitus with neurologic complication, with long-term current use of insulin ([address] 9/2/2009  Type 2 diabetes with nephropathy ([address] 8/20/2018  Unspecified essential hypertension 9/2/2009  Vitamin D deficiency 6/16/2017  Weakness due to cerebrovascular accident Past Surgical History:  
Procedure Laterality Date  HX COLECTOMY  HX HYSTERECTOMY  HX OTHER SURGICAL    
 colonoscopies numerous since 1993 Current Facility-Administered Medications Medication Dose Route Frequency  sodium chloride (NS) flush 5-10 mL  5-10 mL IntraVENous PRN  
 aspirin delayed-release tablet 81 mg  81 mg Oral DAILY  clopidogreL (PLAVIX) tablet 75 mg  75 mg Oral DAILY  levothyroxine (SYNTHROID) tablet 88 mcg  88 mcg Oral ACB  polyethylene glycol (MIRALAX) packet 17 g  17 g Oral DAILY  glucose chewable tablet 16 g  4 Tab Oral PRN  
 glucagon (GLUCAGEN) injection 1 mg  1 mg IntraMUSCular PRN  
 sodium chloride (NS) flush 5-40 mL  5-40 mL IntraVENous Q8H  
 sodium chloride (NS) flush 5-40 mL  5-40 mL IntraVENous PRN  
 naloxone (NARCAN) injection 0.4 mg  0.4 mg IntraVENous PRN  
 dextrose 10% infusion 0-250 mL  0-250 mL IntraVENous PRN  
 insulin glargine (LANTUS) injection 14 Units  14 Units SubCUTAneous QHS  insulin lispro (HUMALOG) injection   SubCUTAneous AC&HS  ondansetron (ZOFRAN) injection 4 mg  4 mg IntraVENous Q4H PRN  
 acetaminophen (TYLENOL) tablet 650 mg  650 mg Oral Q4H PRN  
 cefTRIAXone (ROCEPHIN) 2 g in 0.9% sodium chloride (MBP/ADV) 50 mL  2 g IntraVENous Q24H  
 furosemide (LASIX) injection 20 mg  20 mg IntraVENous DAILY Allergies Allergen Reactions  Statins-Hmg-Coa Reductase Inhibitors Other (comments) Intolerant to statins  Sulfa (Sulfonamide Antibiotics) Other (comments)  
  syncope Family History Problem Relation Age of Onset  Diabetes Mother  Stroke Mother  Diabetes Sister  Other Sister   
     fell and hit her head -  of this  Diabetes Brother  Cancer Father   
     stomach  Diabetes Sister Social History Socioeconomic History  Marital status:  Spouse name: Not on file  Number of children: Not on file  Years of education: Not on file  Highest education level: Not on file Tobacco Use  Smoking status: Never Smoker  Smokeless tobacco: Never Used Substance and Sexual Activity  Alcohol use: No  
 Drug use:  No  
  Sexual activity: Not Currently Partners: Male Objective: 
  Physical Exam 
 
Vitals:  
Vitals:  
 04/19/20 2313 04/19/20 2340 04/20/20 0416 04/20/20 0228 BP: (!) 87/50 94/57 102/69 121/66 Pulse: 97 93 81 91 Resp: 18 18 18 20 Temp: 99.1 °F (37.3 °C) 99 °F (37.2 °C) 98.2 °F (36.8 °C) 98.8 °F (37.1 °C) SpO2: 100% 100% 100% 100% Weight:   134 lb 4.2 oz (60.9 kg) Height:      
 
 
General:    Alert, cooperative, no distress, appears stated age. Neck:   Supple, no  JVD. Back:     Symmetric,   
Lungs:     clear to auscultation bilaterally. Heart[de-identified]    Regular rate and rhythm, S1, S2 normal, grade III/VI harsh systolic murmur RUSB Abdomen:     Soft, non-tender. Bowel sounds present. Extremities:   Extremities normal, atraumatic, no cyanosis or edema. Vascular:   Pulses - 2+ radial  
Skin:   Skin color normal. No rashes or lesions Neurologic:   [place], . Telemetry: normal sinus rhythm ECG: n/a Data Review:  
 
Radiology: XR Results (most recent): 
Results from Hospital Encounter encounter on 04/19/20 XR CHEST PA LAT Narrative Exam:  2 view chest 
 
Indication: Cough, fever Comparison to 12/31/2019. PA and lateral views demonstrate normal heart size. Persistent mild diffuse 
interstitial prominence again noted. Osseous metastatic disease is unchanged. Port-A-Cath is able in position. Calcified granuloma again noted in the right 
apex. Old left rib fractures. Impression Impression: Persistent mild diffuse interstitial prominence again noted and 
grossly unchanged compared to 12/31/2019. Stable osseous metastatic disease. No results for input(s): CPK, TROIQ in the last 72 hours. No lab exists for component: CKQMB, CPKMB, BMPP Recent Labs  
  04/20/20 
[id number] 04/20/20 
0423  133* K 3.4* 3.9  105 CO2 22 QUANTITY NOT SUFFICIENT. SUGGEST RECOLLECTION  
BUN 39* 40* CREA 1.40* 1.48* * 214* PHOS 2.9 QUANTITY NOT SUFFICIENT. SUGGEST RECOLLECTION  
CA 8.6 8.4* Recent Labs  
  04/20/20 
0423 04/19/20 
1038 WBC 30.5* 27.1* HGB 9.7* 10.1* HCT 31.0* 31.4*  
* 115* Recent Labs  
  04/20/20 0423 SGOT 35  
* No results for input(s): CHOL, LDLC in the last 72 hours. No lab exists for component: TGL, HDLC,  HBA1C No results for input(s): CRP, TSH, TSHEXT in the last 72 hours. No lab exists for component: ESR Thank you very much for this referral. I appreciate the opportunity to participate in this patient's care. I will follow along with above stated plan. Christi Montana NP [facility] [address] [place] [address], [address] [facility] 
   [phone] CC: Lenard Jimenez MD

## 2020-04-20 NOTE — PROGRESS NOTES
Day #1 of Cefepime Indication:  Bloodstream infection Current regimen:  2 grams every 8 hours Recent Labs  
  20 
0625 20 
0423 20 
1038 WBC  --  30.5* 27.1*  
CREA 1.40* 1.48* 1.40* BUN 39* 40* 37* Temp (24hrs), Av.1 °F (37.3 °C), Min:98.1 °F (36.7 °C), Max:102.9 °F (39.4 °C) Est CrCl: 25 ml/min Plan: Change to 2 grams every 24 hours per [facility] P&T approved renal dosing protocol. Pharmacy will monitor daily and will make adjustments as necessary for changes in renal function.

## 2020-04-20 NOTE — PROGRESS NOTES
Reason for Admission: Systemic Inflammatory Response Syndrome; UTI   
            
RUR Score: 28%- HIGH    
 
PCP: First and Last name: Trini Hoyt MD 
 Name of Practice: 
 Are you a current patient: Yes/No: Yes Approximate date of last visit: 01/24/2020 Resources/supports as identified by patient/family: Pt's family is primary source of support. Top Challenges facing patient (as identified by patient/family and CM): Finances/Medication cost? Pt insured with [employer] and [facility]. Pt is retired and has Social Security benefits. Pt uses [employer] [facility] [employer]; son reports no concerns of affording medications. Transportation? Son, Anahy Galindo, reports he will provide transportation home after discharge. Support system or lack thereof? See above. Pt's son also reports pt has a personal care aide in home 8hrs/day, 7days/week. Son unsure of agency aide is contacted with. Living arrangements? Pt lives with son, Anahy Galindo, in a private residence; there are 5-6 steps to enter home. Self-care/ADLs/Cognition? Pt's son reports a CPap or BiPap and a walker for DME use and needing assistance with all ADLs (assistance provided by personal aide). Current Advanced Directive/Advance Care Plan: Pt's son reports pt does have an AMD but unsure of location; son reports interest in completing a new AMD with pt. Plan for utilizing home health: Pt currently open with [facility] for PT/OT. Transition of Care Plan: CM met contacted pt's son, Bashir Patel (ph#: [phone]),  to discuss CM role and to assess pt needs. CM verified demographics including insurance and emergency contact information. Son verified PCP and reports no concerns for discharge at this time. CM to follow and assist with disposition needs as they arise. Care Management Interventions PCP Verified by CM: Yes Last Visit to PCP: 01/24/20 Palliative Care Criteria Met (RRAT>21 & CHF Dx)?: No 
Mode of Transport at Discharge: Other (see comment)(SonRobin) Transition of Care Consult (CM Consult): Other(Initial Assessment) MyChart Signup: No(MyChart Active) Discharge Durable Medical Equipment: No 
Physical Therapy Consult: No 
Occupational Therapy Consult: No 
Speech Therapy Consult: No 
Current Support Network: Relative's Home, Has Personal Caregivers Confirm Follow Up Transport: Family Discharge Location Discharge Placement: Home with day care(Disposition TBD/subject to change pending care recommendations) Socorro Casillas RN, BSN Care Management Department

## 2020-04-20 NOTE — CONSULTS
Infectious Disease Consult Note Reason for Consult: GNR Bacteremia Date of Consultation: April 20, 2020 Date of Admission: 4/19/2020 Referring Physician: Dr Genaro Jauregui  
 
 
HPI: 
 
 
Ms Tere Perales Is a 79-year-old lady with a history of metastatic breast cancer with an indwelling port status post chemotherapy about a week ago, rheumatoid arthritis, coronary artery disease, osteoporosis, systolic heart failure, colon cancer, diabetes, E. coli bacteremia in December 2019 who presents with fatigue, fevers, feeling cold and nausea. She reports that she usually feels ill after chemotherapy with tiredness and weakness. But this time around she noticed fevers up to 102 nausea that was new to her. She denied any dysuria symptoms to me. She denies any back pain. She denies any erythema or issues with the port. To her knowledge, she denies any other for an indwelling prosthesis. She denies any recent radiation treatment. She denies any diarrhea. She denies any night sweats, cough or shortness of breath. Apart from going to the infusion center she denies going outside of the house much. She reports a history of arthritis for which she gets in fusion but is not sure of its name at this time. She denies any recent travel history out of the country or exposure to anyone was traveled she lives with 3 of her sons at home who are mostly at home per her. She denies any exposure to any  pets / animals. During this admission, noted to have a T-max of 102.9 and initially hypotensive with a systolic blood pressure in the 80s. She was started on ceftriaxone IV. Labs show uptrending leukocytosis with neutrophilic predominance. She also has some lymphocytopenia. Her chest x-ray has been read as \" persistent mild diffuse interstitial prominence again noted and grossly unchanged compared to 12/31/2019. Stable osseous metastatic disease. \" Her blood cultures dated 4/19/2020 is positive for gram-negative arturo in all 4 bottles obtained from right AC and left AC. Urine culture from 4/19/2020 also has been in the 100,000 gram-negative rods. Her urine analysis is positive for large leukocyte Estrace, negative nitrates, 2+ bacteria and greater than 100 white count. Of note, she had E. coli grew out of blood cultures dated 12/25/19 and the site has been indicated as  \"Right AC and left port \". Repeat blood culture on 12/28/2020 was negative. A transthoracic echo from 12/27/2019 was read as aortic valve sclerosis moderate regurgitation of the mitral valve. Tricuspid and pulmonic valves were read as normal valve structure. It appears that she was treated with IV antibiotics initially and then transition to oral Cipro to complete a 14-day course of antibiotic therapy to be stopped on 1/7/2020. I am consulted today for antibiotic recommendations. Past Medical History: 
Past Medical History:  
Diagnosis Date  Acute on chronic systolic HF (heart failure) ([address] 5/19/2018  Age-related osteoporosis without current pathological fracture 9/2/2009  Anemia 9/2/2009  Asymptomatic hyperuricemia 2/16/2017  Breast cancer ([address]  CAD (coronary artery disease) 6/21/2018  Carotid bruit 8/31/2011  CHF (congestive heart failure) ([address]  CKD (chronic kidney disease) stage 3, GFR 30-59 ml/min ([facility]) 10/25/2017  Colon cancer ([address] 9/2/2009  
 surgery/chemo  Colon cancer ([address] 9/2/2009  DM (diabetes mellitus) ([address] 9/2/2009  GERD (gastroesophageal reflux disease)  H/O: CVA 9/2/2009  
 slight l sided weakness  Heart attack ([address]  HTN, goal below 140/90 9/2/2009  Hypothyroid 9/2/2009  Ill-defined condition   
 seasonal allergies  Long-term use of immunosuppressant medication 11/21/2016  Metastatic breast cancer ([address] 6/1/2018  Microalbuminuria 9/2/2009  Osteoporosis 9/2/2009  Other and unspecified hyperlipidemia 1/27/2010  PAD (peripheral artery disease) ([facility] 2011  Primary osteoarthritis of both knees 2016  Rheumatoid arthritis involving ankle ([facility] 2016  Rheumatoid arthritis(714.0) 2009  Seropositive rheumatoid arthritis of multiple sites ([facility] 2016  Statin intolerance 2016  Type 2 diabetes mellitus with neurologic complication, with long-term current use of insulin ([facility] 2009  Type 2 diabetes with nephropathy ([facility] 2018  Unspecified essential hypertension 2009  Vitamin D deficiency 2017  Weakness due to cerebrovascular accident Surgical History: 
Past Surgical History:  
Procedure Laterality Date  HX COLECTOMY  HX HYSTERECTOMY  HX OTHER SURGICAL    
 colonoscopies numerous since  Family History:  
Family History Problem Relation Age of Onset  Diabetes Mother  Stroke Mother  Diabetes Sister  Other Sister   
     fell and hit her head -  of this  Diabetes Brother  Cancer Father   
     stomach  Diabetes Sister Social History: · Living Situation: Lives at home with 3 sons, obtained a masters degree in education per patient,  · Denied Tobacco: · Alcohol:Denied · Illicit Drugs: Denied · Sexual History: Past, denied recent · Travel History she reports that she is originally from [address] [place] [address] and moved about 50 years ago denied states . · Denies any recent travel · Exposures: Denied any animal or pet exposure Allergies: Allergies Allergen Reactions  Statins-Hmg-Coa Reductase Inhibitors Other (comments) Intolerant to statins  Sulfa (Sulfonamide Antibiotics) Other (comments)  
  syncope Review of Systems: 
 
 10 point review of systems obtained Pertinent positives per HPI All others negative at this time Medications: No current facility-administered medications on file prior to encounter. Current Outpatient Medications on File Prior to Encounter Medication Sig Dispense Refill  Repatha SureClick pen injection ADMINISTER 1 ML UNDER THE SKIN EVERY 14 DAYS 2 mL 11  
 carvediloL (COREG) 12.5 mg tablet TAKE ONE TABLET BY MOUTH TWICE A DAY 60 Tab 5  
 riTUXimab in 0.9% sodium chloride solution 1,000 mg by IntraVENous route every 6 months.  insulin aspart U-100 (NOVOLOG FLEXPEN U-100 INSULIN) 100 unit/mL (3 mL) inpn by SubCUTAneous route. PER SLIDING SCALE    
 OTHER daily. MOUTH [address] DAILY  multivitamin (ONE A DAY) tablet Take 1 Tab by mouth daily.  dextrose (GLUCOSE GEL PO) Take 2 Tabs by mouth as needed for Other (low blood sugar). As needed  furosemide (LASIX) 40 mg tablet Take 2 Tabs by mouth daily. (Patient taking differently: Take 40 mg by mouth daily. 20 mg daily alternating with 40 mg every other day) 60 Tab 0  clopidogrel (PLAVIX) 75 mg tab TAKE ONE TABLET BY MOUTH DAILY (Patient taking differently: 75 mg daily.) 30 Tab 11  
 insulin degludec (TRESIBA FLEXTOUCH U-100) 100 unit/mL (3 mL) inpn 14 Units by SubCUTAneous route daily.  acetaminophen (TYLENOL) 325 mg tablet Take 2 Tabs by mouth every four (4) hours as needed. (Patient taking differently: Take 1,000 mg by mouth every four (4) hours as needed.) 10 Tab 0  
 nitroglycerin (NITROSTAT) 0.4 mg SL tablet 1 Tab by SubLINGual route as needed for Chest Pain. Up to 3 doses. 40 Tab 0  
 polyethylene glycol (MIRALAX) 17 gram packet Take 1 Packet by mouth daily. 1 Each 0  
 levothyroxine (SYNTHROID) 88 mcg tablet Take 88 mcg by mouth Daily (before breakfast).  aspirin delayed-release 81 mg tablet Take 81 mg by mouth daily.  OMEGA-3 FATTY ACIDS/FISH OIL (OMEGA 3 FISH OIL PO) Take 300 mg by mouth daily.  CHOLECALCIFEROL, VITAMIN D3, (VITAMIN D-3 PO) Take 1,000 Units by mouth daily. Current Facility-Administered Medications:   cefepime (MAXIPIME) 2 g in 0.9% sodium chloride (MBP/ADV) 100 mL, 2 g, IntraVENous, Q24H, Eve Pollard, DO, Last Rate: 200 mL/hr at 04/20/20 1149, 2 g at 04/20/20 1149 
  sodium chloride (NS) flush 5-10 mL, 5-10 mL, IntraVENous, PRN, Jackie Arroyo MD 
  aspirin delayed-release tablet 81 mg, 81 mg, Oral, DAILY, Norberto Loyd MD, 81 mg at 04/20/20 9336   clopidogreL (PLAVIX) tablet 75 mg, 75 mg, Oral, DAILY, Norberto Loyd MD, 75 mg at 04/20/20 3099   levothyroxine (SYNTHROID) tablet 88 mcg, 88 mcg, Oral, ACB, Norberto Loyd MD, 88 mcg at 04/20/20 8126   polyethylene glycol (MIRALAX) packet 17 g, 17 g, Oral, DAILY, Jackie Arroyo MD 
  glucose chewable tablet 16 g, 4 Tab, Oral, PRN, Jackie Arroyo MD 
  glucagon [facility]) injection 1 mg, 1 mg, IntraMUSCular, PRN, Jackie Arroyo MD 
  sodium chloride (NS) flush 5-40 mL, 5-40 mL, IntraVENous, Q8H, Jackie Arroyo MD, 10 mL at 04/20/20 4014   sodium chloride (NS) flush 5-40 mL, 5-40 mL, IntraVENous, PRN, Jackie Arroyo MD 
  naloxone [facility]) injection 0.4 mg, 0.4 mg, IntraVENous, PRN, Jackie Arroyo MD 
  dextrose 10% infusion 0-250 mL, 0-250 mL, IntraVENous, PRN, Jackie Arroyo MD 
  insulin glargine (LANTUS) injection 14 Units, 14 Units, SubCUTAneous, QHS, Norberto Loyd MD, 14 Units at 04/19/20 2225   insulin lispro (HUMALOG) injection, , SubCUTAneous, AC&HS, Norberto Loyd MD, 5 Units at 04/20/20 1135   [facility]ron [facility]) injection 4 mg, 4 mg, IntraVENous, Q4H PRN, Jackie Arroyo MD 
  acetaminophen (TYLENOL) tablet 650 mg, 650 mg, Oral, Q4H PRN, Norberto Loyd MD, 650 mg at 04/20/20 1135   furosemide (LASIX) injection 20 mg, 20 mg, IntraVENous, DAILY, Andres Arroyo MD, 20 mg at 04/19/20 2025 Physical Exam: 
 
Vitals:  
Patient Vitals for the past 24 hrs: 
 Temp Pulse Resp BP SpO2  
04/20/20 1158    133/72   
04/20/20 1127 (!) 101.3 °F (38.5 °C) (!) 106 20 133/72 100 % 04/20/20 0704 98.8 °F (37.1 °C) 91 20 121/66 100 % 04/20/20 0416 98.2 °F (36.8 °C) 81 18 102/69 100 % 04/19/20 2340 99 °F (37.2 °C) 93 18 94/57 100 % 04/19/20 2313 99.1 °F (37.3 °C) 97 18 (!) 87/50 100 % 04/19/20 2256 99.6 °F (37.6 °C)      
04/19/20 1956 (!) 102.9 °F (39.4 °C) (!) 120 20 132/72 100 % 04/19/20 1540 98.1 °F (36.7 °C)      
04/19/20 1508 98.2 °F (36.8 °C) 86 16 122/64 100 % 04/19/20 1500     100 % 04/19/20 1348 98.5 °F (36.9 °C) 86 18 92/53 97 % ·  
· GEN: NAD 
· HEENT: Normocephalic, atraumatic, PERRL, no scleral icterus,  no cervical, no lymphadenopathy, no sinus tenderness, no thrush · CV: S1, S2 heard regularly, no murmurs, thrills or rubs heard · Lungs: Clear to auscultation bilaterally · Abdomen: soft, non distended, non tender, no organomegaly appreciated, no CVA tenderness · Genitourinary: no genital discharge, no castro · Extremities: no edema · Neuro: Alert, oriented to time, place and situation, moves all extremities to commands, verbal  
· Skin: no rash · Psych: good affect, good eye contact, non tearful · Lines:  
 
 
Labs:  
Recent Results (from the past 24 hour(s)) GLUCOSE, POC Collection Time: 04/19/20  5:08 PM  
Result Value Ref Range Glucose (POC) 323 (H) 65 - 100 mg/dL Performed by Timothy Sr GLUCOSE, POC Collection Time: 04/19/20  9:12 PM  
Result Value Ref Range Glucose (POC) 238 (H) 65 - 100 mg/dL Performed by Guanakito Saul (CCT) CBC WITH AUTOMATED DIFF Collection Time: 04/20/20  4:23 AM  
Result Value Ref Range WBC 30.5 (H) 3.6 - 11.0 K/uL  
 RBC 3.24 (L) 3.80 - 5.20 M/uL HGB 9.7 (L) 11.5 - 16.0 g/dL HCT 31.0 (L) 35.0 - 47.0 % MCV 95.7 80.0 - 99.0 FL  
 MCH 29.9 26.0 - 34.0 PG  
 MCHC 31.3 30.0 - 36.5 g/dL RDW 20.5 (H) 11.5 - 14.5 % PLATELET 034 (L) 352 - 400 K/uL MPV 10.7 8.9 - 12.9 FL  
 NRBC 0.0 0  WBC ABSOLUTE NRBC 0.00 0.00 - 0.01 K/uL NEUTROPHILS 89 (H) 32 - 75 % LYMPHOCYTES 2 (L) 12 - 49 % MONOCYTES 7 5 - 13 % EOSINOPHILS 0 0 - 7 % BASOPHILS 0 0 - 1 % IMMATURE GRANULOCYTES 2 (H) 0.0 - 0.5 % ABS. NEUTROPHILS 27.2 (H) 1.8 - 8.0 K/UL  
 ABS. LYMPHOCYTES 0.6 (L) 0.8 - 3.5 K/UL  
 ABS. MONOCYTES 2.1 (H) 0.0 - 1.0 K/UL  
 ABS. EOSINOPHILS 0.0 0.0 - 0.4 K/UL  
 ABS. BASOPHILS 0.0 0.0 - 0.1 K/UL  
 ABS. IMM. GRANS. 0.6 (H) 0.00 - 0.04 K/UL  
 DF SMEAR SCANNED    
 RBC COMMENTS ANISOCYTOSIS 2+ 
    
 RBC COMMENTS MACROCYTOSIS 1+ 
    
 RBC COMMENTS OVALOCYTES PRESENT 
    
 RBC COMMENTS MACHO CELLS 
PRESENT 
    
 RBC COMMENTS POLYCHROMASIA PRESENT 
    
METABOLIC PANEL, COMPREHENSIVE Collection Time: 04/20/20  4:23 AM  
Result Value Ref Range Sodium 133 (L) 136 - 145 mmol/L Potassium 3.9 3.5 - 5.1 mmol/L Chloride 105 97 - 108 mmol/L  
 CO2 QUANTITY NOT SUFFICIENT. SUGGEST RECOLLECTION 21 - 32 mmol/L Anion gap Cannot be calculated 5 - 15 mmol/L Glucose 214 (H) 65 - 100 mg/dL BUN 40 (H) 6 - 20 MG/DL Creatinine 1.48 (H) 0.55 - 1.02 MG/DL  
 BUN/Creatinine ratio 27 (H) 12 - 20 GFR est AA 41 (L) >60 ml/min/1.73m2 GFR est non-AA 34 (L) >60 ml/min/1.73m2 Calcium 8.4 (L) 8.5 - 10.1 MG/DL Bilirubin, total 0.7 0.2 - 1.0 MG/DL  
 ALT (SGPT) 31 12 - 78 U/L  
 AST (SGOT) 35 15 - 37 U/L Alk. phosphatase 177 (H) 45 - 117 U/L Protein, total 6.4 6.4 - 8.2 g/dL Albumin 2.5 (L) 3.5 - 5.0 g/dL Globulin 3.9 2.0 - 4.0 g/dL A-G Ratio 0.6 (L) 1.1 - 2.2 MAGNESIUM Collection Time: 04/20/20  4:23 AM  
Result Value Ref Range Magnesium QUANTITY NOT SUFFICIENT. SUGGEST RECOLLECTION 1.6 - 2.4 mg/dL PHOSPHORUS Collection Time: 04/20/20  4:23 AM  
Result Value Ref Range Phosphorus QUANTITY NOT SUFFICIENT. SUGGEST RECOLLECTION 2.6 - 4.7 MG/DL MAGNESIUM Collection Time: 04/20/20  6:25 AM  
Result Value Ref Range Magnesium 1.9 1.6 - 2.4 mg/dL METABOLIC PANEL, BASIC Collection Time: 04/20/20  6:25 AM  
Result Value Ref Range Sodium 136 136 - 145 mmol/L  Potassium 3.4 (L) 3.5 - 5.1 mmol/L  
 Chloride 104 97 - 108 mmol/L  
 CO2 22 21 - 32 mmol/L Anion gap 10 5 - 15 mmol/L Glucose 208 (H) 65 - 100 mg/dL BUN 39 (H) 6 - 20 MG/DL Creatinine 1.40 (H) 0.55 - 1.02 MG/DL  
 BUN/Creatinine ratio 28 (H) 12 - 20 GFR est AA 44 (L) >60 ml/min/1.73m2 GFR est non-AA 36 (L) >60 ml/min/1.73m2 Calcium 8.6 8.5 - 10.1 MG/DL  
PHOSPHORUS Collection Time: 04/20/20  6:25 AM  
Result Value Ref Range Phosphorus 2.9 2.6 - 4.7 MG/DL  
GLUCOSE, POC Collection Time: 04/20/20  8:08 AM  
Result Value Ref Range Glucose (POC) 170 (H) 65 - 100 mg/dL Performed by Emotient P   
ECHO ADULT COMPLETE Collection Time: 04/20/20 10:54 AM  
Result Value Ref Range LV E' Septal Velocity 5.29 cm/s Tapse 2.62 (A) 1.5 - 2.0 cm GLUCOSE, POC Collection Time: 04/20/20 11:24 AM  
Result Value Ref Range Glucose (POC) 265 (H) 65 - 100 mg/dL Performed by Emotient P Microbiology Data: 
  
  Blood: 4/19/20 Component Value Ref Range & Units Status Special Requests: NO SPECIAL REQUESTS    Preliminary Culture result: Abnormal     Preliminary APPARENT GRAM NEGATIVE RODS GROWING IN ALL 4 BOTTLES DRAWN . ..(SITE= RAC AND LAC) Result History CULTURE, BLOOD, PAIRED on 4/20/2020 Urine: 4/19/20 
    
Component Value Ref Range & Units Status Special Requests: NO SPECIAL REQUESTS    Preliminary [place] Count >100,000 COLONIES/mL    Preliminary Culture result: GRAM NEGATIVE RODSAbnormal     Preliminary Result History 12/28/19 BLOOD Component Value Ref Range & Units Status Special Requests: NO SPECIAL REQUESTS    Final  
Culture result: NO GROWTH 5 DAYS    Final  
Result History Urine 12/25/19 Urine   
    
Component Value Ref Range & Units Status Special Requests: NO SPECIAL REQUESTS Reflexed from [id number]    Final  
[place] Count >100,000 COLONIES/mL    Final  
Culture result: ESCHERICHIA COLIAbnormal     Final  
Susceptibility Escherichia coli FARHAN Amikacin ($) <=16 ug/mL S Ampicillin ($) <=8 ug/mL S Ampicillin/sulbactam ($) <=8/4 ug/mL S Aztreonam ($$$$) <=4 ug/mL S Cefazolin ($) <=8 ug/mL S Cefepime ($$) <=4 ug/mL S Cefotaxime <=2 ug/mL S Ceftazidime ($) <=1 ug/mL S Ceftriaxone ($) <=1 ug/mL S Cefuroxime ($) <=4 ug/mL S Ciprofloxacin ($) <=1 ug/mL S Gentamicin ($) <=4 ug/mL S Imipenem <=1 ug/mL S Levofloxacin ($) <=2 ug/mL S Meropenem ($$) <=1 ug/mL S Nitrofurantoin <=32 ug/mL S Piperacillin/Tazobac ($) <=16 ug/mL S Tobramycin ($) <=4 ug/mL S Trimeth/Sulfa <=2/38 ug/mL S   
 
 
12/25/19 Blood Blood   
    
Component Value Ref Range & Units Status Special Requests: NO SPECIAL REQUESTS    Final  
Culture result: Abnormal     Final  
ESCHERICHIA COLI GROWING IN ALL 4 BOTTLES DRAWN (SITE=RAC AND L PORT) Culture result: Abnormal     Final  
PRELIMINARY REPORT OF GRAM NEGATIVE RODS GROWING IN 3 OF 4 BOTTLES DRAWN CALLED TO AND READ BACK BY [address] [place] [address] Street, RN ON 12/26/19 AT 4642. [address] Susceptibility Escherichia coli FARHAN Amikacin ($) <=16 ug/mL S Ampicillin ($) <=8 ug/mL S Ampicillin/sulbactam ($) <=8/4 ug/mL S Aztreonam ($$$$) <=4 ug/mL S Cefazolin ($) <=8 ug/mL S Cefepime ($$) <=4 ug/mL S Cefotaxime <=2 ug/mL S Ceftazidime ($) <=1 ug/mL S Ceftriaxone ($) <=1 ug/mL S Cefuroxime ($) <=4 ug/mL S Ciprofloxacin ($) <=1 ug/mL S Gentamicin ($) <=4 ug/mL S Imipenem <=1 ug/mL S Levofloxacin ($) <=2 ug/mL S Meropenem ($$) <=1 ug/mL S Piperacillin/Tazobac ($) <=16 ug/mL S Tobramycin ($) <=4 ug/mL S Trimeth/Sulfa <=2/38 ug/mL S   
 
 
6/20/18 6/21/2018 10:58 AM - Moi, Lab In [facility]  
 
Specimen Information: Blood  
    
Component Value Ref Range & Units Status Special Requests: NO SPECIAL REQUESTS    Final  
Culture result: Abnormal     Final  
STAPHYLOCOCCUS SPECIES, COAGULASE NEGATIVE GROWING IN 1 OF 4 BOTTLES DRAWN (SITE = R ARM) Culture result:    Final  
REMAINING BOTTLE(S) HAS/HAVE NO GROWTH IN 5 DAYS Imaging: TTE 4/20/20 Interpretation Summary · Normal cavity size and wall thickness. Mild-to-moderate systolic function. Estimated left ventricular ejection fraction is 40 - 45%. Abnormal left ventricular wall motion. Age-appropriate left ventricular diastolic function. · Moderately dilated left atrium. · Mildly dilated right atrium. · Color flow Doppler was used. · Aortic valve leaflet calcification present with reduced excursion. Aortic valve mean gradient is 17 mmHg. Aortic valve area is 1.4 cm2. Moderate aortic valve regurgitation is present. · Mitral valve non-specific thickening. Mild to moderate mitral valve regurgitation is present. · Mild to moderate tricuspid valve regurgitation is present. · Moderate pulmonary hypertension. Pulmonary arterial systolic pressure is 55 mmHg. · Small posterior pericardial effusion. Echo Findings Left Ventricle Normal cavity size and wall thickness. Mild-to-moderate systolic dysfunction. The estimated ejection fraction is 40 - 45%. LV wall motion is noted to be abnormal and apex. There is age-appropriate left ventricular diastolic function. Wall Scoring The following segments are hypokinetic: apical anterior, apical inferior and apex. Other segments could not be evaluated. Left Atrium Moderately dilated left atrium. Patent foramen ovale visualized with left to right shunting indicated by color flow Doppler. No closure device present. Right Ventricle Normal cavity size and global systolic function. Right Atrium Mildly dilated right atrium. Interatrial Septum There was a moderate left to right shunt, shown on color Doppler. Aortic Valve Trileaflet valve structure and no stenosis. There is leaflet calcification with reduced excursion.  Aortic valve mean gradient is 17 mmHg. Aortic valve area is 1.4 cm2. Moderate aortic valve regurgitation. There is no vegetation on the aortic valve. Mitral Valve No stenosis. Mitral valve non-specific thickening. Mild to moderate regurgitation. There is no vegetation on the mitral valve. Tricuspid Valve Normal valve structure and no stenosis. Mild to moderate regurgitation. There is no vegetation on the tricuspid valve. Pulmonic Valve Pulmonic valve not well visualized. Aorta Normal aortic root. Pulmonary Artery Pulmonary arterial systolic pressure (PASP) is 55 mmHg. Pulmonary hypertension found to be moderate. Pericardium Small posterior pericardial effusion noted. No indications of tamponade present. CXR 4/19/20 PA and lateral views demonstrate normal heart size. Persistent mild diffuse 
interstitial prominence again noted. Osseous metastatic disease is unchanged. Port-A-Cath is able in position. Calcified granuloma again noted in the right 
apex. Old left rib fractures. 
  
IMPRESSION Impression: Persistent mild diffuse interstitial prominence again noted and 
grossly unchanged compared to 12/31/2019. Stable osseous metastatic disease. Assessment / Plan:  
 
Ms Richardson Terry Is a 68-year-old lady with a history of metastatic breast cancer with an indwelling port status post chemotherapy about a week ago, rheumatoid arthritis, coronary artery disease, osteoporosis, systolic heart failure, colon cancer, diabetes, E. coli bacteremia in December 2019 who presents with fatigue, fevers, feeling cold and nausea. During this admission, noted to have a T-max of 102.9 and initially hypotensive with a systolic blood pressure in the 80s. She was started on ceftriaxone IV. Labs show uptrending leukocytosis with neutrophilic predominance. She also has some lymphocytopenia.   Her chest x-ray has been read as \" persistent mild diffuse interstitial prominence again noted and grossly unchanged compared to 12/31/2019. Stable osseous metastatic disease. \" Her blood cultures dated 4/19/2020 is positive for gram-negative arturo in all 4 bottles obtained from right AC and left AC. Urine culture from 4/19/2020 also has been in the 100,000 gram-negative rods. Her urine analysis is positive for large leukocyte Estrace, negative nitrates, 2+ bacteria and greater than 100 white count. Of note, she had E. coli grew out of blood cultures dated 12/25/19 and the site has been indicated as  \"Right AC and left port \". Repeat blood culture on 12/28/2020 was negative. A transthoracic echo from 12/27/2019 was read as aortic valve sclerosis moderate regurgitation of the mitral valve. Tricuspid and pulmonic valves were read as normal valve structure. It appears that she was treated with IV antibiotics initially and then transition to oral Cipro to complete a 14-day course of antibiotic therapy to be stopped on 1/7/2020. 
 
 
1) gram-negative arturo bacteremia in the setting of chemotherapy a week ago with an indwelling port Of note she had E. coli growing in her blood cultures in December 2019. This was reported to be right before meals and left port as site. Her port however is on the right chest area Recommendations Broadened gram-negative coverage to cefepime IV Await blood culture results Check blood cultures every 24-48 hours pending clearance I am concerned that her port will likely need to be removed given second recurrence of bacteremia with a gram-negative arturo and an indwelling port which could be seeded A transthoracic echo was read as no vegetation on aortic, tricuspid and mitral valve. Pulmonic valve could not be visualized well Side effects of antibiotics discussed with the patient including nausea vomiting diarrhea risk for C. difficile, seizures 2) metastatic breast cancer Reviewed her last oncology note from 1/6/2020.  \" Has had nice response to chemo (abraxane/xeloda) with recent documented decrease in hepatic and axillary involvement. .. her last rx was 12/16 (cycle 5 ) she received 7 days of xeloda ending on 12/22. Jonathan norris neulasta on 12/23\" 3) coronary artery disease, systolic dysfunction Plans per cardiology Noted she is on Plavix 4) rheumatoid arthritis We will need to clarify if she is getting a DMARD or any immunosuppressive medication which increases the risk for infections 5) marked leukocytosis likely in the setting of bacteremia Continue to monitor as high risk for C. difficile infection as well which can present with marked leukocytosis 6) history of colon cancer per chart 7) osteoporosis per chart 8) DVT prophylaxis per primary team, noted she is on Plavix Discussed above recommendations with primary team today Thank for the opportunity to participate in the care of this patient. Please contact with questions or concerns.   
 
Eve Pollard,  
2:03 PM

## 2020-04-21 NOTE — PROGRESS NOTES
Nurse perfect serve MD regarding patient admitting symptoms. MD states that patient does not have any concerning symptoms to be tested for COVID. According to MD bacteremia is the cause for her fevers and patient is responding well to abx.

## 2020-04-21 NOTE — PROGRESS NOTES
TRANSFER - OUT REPORT: 
 
Verbal report given to [facility] (name) on Lyndsay Deal  being transferred to 6  (unit) for routine progression of care Report consisted of patients Situation, Background, Assessment and  
Recommendations(SBAR). Information from the following report(s) SBAR, Kardex, MAR, Recent Results and Cardiac Rhythm NSR was reviewed with the receiving nurse. Lines:  
Peripheral IV 04/19/20 Right Antecubital (Active) Site Assessment Clean, dry, & intact 4/21/2020  4:00 PM  
Phlebitis Assessment 0 4/21/2020  4:00 PM  
Infiltration Assessment 0 4/21/2020  4:00 PM  
Dressing Status Clean, dry, & intact 4/21/2020  4:00 PM  
Dressing Type Tape;Transparent 4/21/2020  4:00 PM  
Hub Color/Line Status Pink;Capped 4/21/2020  4:00 PM  
Action Taken Open ports on tubing capped 4/21/2020  4:00 PM  
Alcohol Cap Used Yes 4/21/2020  4:00 PM  
   
Peripheral IV 04/19/20 Left Antecubital (Active) Site Assessment Clean, dry, & intact 4/21/2020  4:00 PM  
Phlebitis Assessment 0 4/21/2020  4:00 PM  
Infiltration Assessment 0 4/21/2020  4:00 PM  
Dressing Status Clean, dry, & intact 4/21/2020  4:00 PM  
Dressing Type Transparent 4/21/2020  4:00 PM  
Hub Color/Line Status Pink;Capped 4/21/2020  4:00 PM  
Action Taken Open ports on tubing capped 4/21/2020  4:00 PM  
Alcohol Cap Used Yes 4/21/2020  4:00 PM  
  
 
Opportunity for questions and clarification was provided. Patient transported with: 
 [employer] Patient Vitals for the past 8 hrs: 
 Temp Pulse Resp BP SpO2  
04/21/20 1659 99 °F (37.2 °C)      
04/21/20 1612 99.4 °F (37.4 °C) (!) 102 18 125/72 100 % 04/21/20 1600     100 % 04/21/20 1120 99.2 °F (37.3 °C) 99 24 121/66 100 % Problem: Falls - Risk of 
Goal: *Absence of Falls Description: Document Kamilah Lynch Fall Risk and appropriate interventions in the flowsheet. Outcome: Progressing Towards Goal 
Note: Fall Risk Interventions: Mobility Interventions: PT Consult for assist device competence, PT Consult for mobility concerns, OT consult for ADLs, Communicate number of staff needed for ambulation/transfer Medication Interventions: Teach patient to arise slowly, Patient to call before getting OOB Elimination Interventions: Call light in reach, Toilet paper/wipes in reach, Toileting schedule/hourly rounds, Patient to call for help with toileting needs Problem: Pressure Injury - Risk of 
Goal: *Prevention of pressure injury Description: Document Calderon Scale and appropriate interventions in the flowsheet. Outcome: Progressing Towards Goal 
Note: Pressure Injury Interventions: 
 
Moisture Interventions: Minimize layers, Internal/External urinary devices, Check for incontinence Q2 hours and as needed Activity Interventions: PT/OT evaluation, Pressure redistribution bed/mattress(bed type) Mobility Interventions: PT/OT evaluation, Pressure redistribution bed/mattress (bed type) Nutrition Interventions: Document food/fluid/supplement intake Friction and Shear Interventions: Apply protective barrier, creams and emollients, Minimize layers Problem: Heart Failure: Day 3 Goal: *Oxygen saturation within defined limits Outcome: Progressing Towards Goal

## 2020-04-21 NOTE — PROGRESS NOTES
NUTRITION COMPLETE ASSESSMENT 
 
RECOMMENDATIONS:  
Strict oral hygiene. Hx of mouth sores with chemo. Add Magic Mouthwash PRN Resume home calcium and Vit D supplements Diet adjusted and supplements added as below. Will continue to follow along and monitor for PO adequacy/ supplement acceptance during admission Interventions/Plan:  
Food/Nutrient Delivery:  Modify diet/texture/consistency/nutrients - mechanical soft, consistent CHO diet;  Commercial supplement - vanilla Glucerna BID; assistance with meals PRN Assessment:  
Reason for Assessment: MD Consult Diet: Consistent CHO Supplements: none at this time Meal Intake:  
Patient Vitals for the past 168 hrs: 
 % Diet Eaten 04/21/20 0900 75 % 04/20/20 1000 75 % Subjective: Assessment completed by chart review. Interview limited in attempts to decrease exposure risk during pandemic, especially for immunocompromised individuals. Attempted phone call x 4, but line busy or not answer. Per previous RD assessments, pt likes vanilla Ensure. Objective: 
82 y.o. female admitted with UTI, sepsis/bacteremia PMHx CHF, osteoporosis, metastatic breast CA currently on chemo (last cycle 4/1/20), CAD, CKD stage 3, colon cancer s/p hemicolectomy, DM, GERD, CVA with left-sided weakness, MI, HTN, hypothyroidism,  PAD, RA with long-term use of immunosuppressants, Vit D deficiency EHR reviewed. Oncology and ID on board. Per providers, +protein/calorie malnutrition. Weight appears stable on admission, but presently up 6-7 lb since admission. ?if fluid vs bed scale discrepancy. No significant edema reported. On Lasix. Noted very poor appetite. However, at this time unable to support malnutrition dx given limited information re: diet and wt hx. Pt currently saturating well on room air. No respiratory concerns noted that could be affecting PO intact.  
 
Previous SLP eval from 5/2018 indicates baseline diet of regular, bite-sized pieces d/t dentition. Per most recent RA office visit in 2/2020 - she is taking calcium 1200 mg and 1000 units of vitamin D daily. Also endorsed mouth sores from chemo. Last HbA1C 6.7% in December 2019 indicating good control at home. BG presently elevated likely in part d/t infection. Wt Readings from Last 20 Encounters:  
04/21/20 62.9 kg (138 lb 10.7 oz) 04/19/20 59.9 kg (132 lb 0.9 oz) - bed weight, compared to pt stated 59.4 kg (131 lb)  
02/24/20 57.6 kg (127 lb) 02/17/20 59.9 kg (132 lb)  
01/30/20 58.1 kg (128 lb)  
01/24/20 59.7 kg (131 lb 9.6 oz) 01/06/20 61.4 kg (135 lb 5.8 oz)  
11/15/19 59.9 kg (132 lb)  
08/25/19 57.7 kg (127 lb 3.3 oz) 08/25/19 58.1 kg (128 lb)  
08/22/19 58.1 kg (128 lb) 08/08/19 58.7 kg (129 lb 6.4 oz) 07/02/19 57.6 kg (127 lb) 05/30/19 56.7 kg (125 lb) 04/12/19 57.2 kg (126 lb)  
03/12/19 55.8 kg (123 lb) 02/18/19 54.4 kg (120 lb) 01/11/19 54.9 kg (121 lb) 10/19/18 54.4 kg (120 lb) 08/20/18 54.5 kg (120 lb 3.2 oz)  
07/14/18 56.7 kg (125 lb) Nutritionally Significant Medications:  
Maxipime, Plavix, Retacrit, Lasix, Lantus, correctional scale insulin, Synthroid, Zofran PRN, Miralax, Klor Intake/Output: 
 
Intake/Output Summary (Last 24 hours) at 4/21/2020 1257 Last data filed at 4/21/2020 1200 Gross per 24 hour Intake 460 ml Output 1250 ml Net -790 ml Last BM: 4/19 Physical Findings: Abdomen: soft, active bowel sounds Edema: none Skin Integrity: intact Nutrition focused physical exam: deferred at this time d/t pandemic Anthropometrics: 
Weight Loss Metrics 4/21/2020 2/24/2020 2/17/2020 1/30/2020 1/24/2020 1/10/2020 1/6/2020 Today's Wt 138 lb 10.7 oz 127 lb 132 lb 128 lb 131 lb 9.6 oz - 135 lb 5.8 oz  
BMI 26.2 kg/m2 24 kg/m2 24.94 kg/m2 24.19 kg/m2 21.9 kg/m2 22.53 kg/m2 22.53 kg/m2 Weight Source: Bed Height: 5' 1\" (154.9 cm), Body mass index is 26.2 kg/m². IBW : 47.6 kg (105 lb), % IBW (Calculated): 132.07 % Usual Body Weight: 59 kg (130 lb),   
 
Labs:   
 
Recent Labs  
  04/20/20 
0625 04/20/20 
0423 04/19/20 
1038 * 214* 238* BUN 39* 40* 37* CREA 1.40* 1.48* 1.40*  133* 136  
K 3.4* 3.9 3.7  105 103 CO2 22 QUANTITY NOT SUFFICIENT. SUGGEST RECOLLECTION 25  
CA 8.6 8.4* 8.4* PHOS 2.9 QUANTITY NOT SUFFICIENT. SUGGEST RECOLLECTION  --   
MG 1.9 QUANTITY NOT SUFFICIENT. SUGGEST RECOLLECTION  --   
 
 
Recent Labs  
  04/20/20 
0423 SGOT 35 ALT 31  
* TBILI 0.7 TP 6.4 ALB 2.5*  
GLOB 3.9 Recent Labs 04/19/20 
1038 LAC 1.4 Recent Labs  
  04/21/20 
0359 04/20/20 
0423 WBC 21.3* 30.5* HGB 9.1* 9.7* HCT 27.6* 31.0*  
* 112* No results for input(s): PREALB in the last 72 hours. No results for input(s): TRIGL in the last 72 hours. Recent Labs  
  04/21/20 
1128 04/21/20 
0759 04/20/20 
2121 04/20/20 
1633 04/20/20 
1124 04/20/20 
5093 04/19/20 
2112 04/19/20 
1708 GLUCPOC 219* 137* 281* 213* 265* 170* 238* 323* Lab Results Component Value Date/Time Hemoglobin A1c 6.7 (H) 12/26/2019 04:40 AM  
 Hemoglobin A1c (POC) 7.2 03/09/2012 04:16 PM  
 
 
Cultural, Zoroastrian and ethnic food preferences identified: originally from [facility] (moved to [address] [place] [address],[address] 50 years ago) Food Allergies: NKFA Diet Restrictions: none Estimated Nutrition Needs:  
Kcals/day: 1300 Kcals/day Protein: 70 g(1.2 gm/kg - cancer) Fluid: 1500 ml(older adult) Based On: [employer]( x 1.3+) Weight Used: Actual wt(59.9 kg) Pt expected to meet estimated nutrient needs:  Unable to predict at this time Nutrition Diagnosis:  
1. Increased nutrient needs related to disease as evidenced by metastatic breast CA on chemo 2. Altered nutrition-related lab values related to endocrine dysfuction with superimposed infection as evidenced by -281 mg/dL past 24 hours 3.   related to   as evidenced by   
 Goals:   
 PO >/=50% of meals with at least 1 supplement daily over next 5-7 days Monitoring & Evaluation: Total energy intake Weight/weight change, Lean body mass, fat free mass, Electrolyte and renal profile, Glucose profile, GI, CV-pulmonary Previous Nutrition Goals Met:   N/A Previous Recommendations:    N/A Education & Discharge Needs: 
 [x] None Identified or too early to determine 
 [] Identified and addressed - see intervention/plan 
 [x] Participated in care plan, discharge planning, and/or interdisciplinary rounds Quinton Ramon RD Available via [employer] Pager# [phone]

## 2020-04-21 NOTE — PROGRESS NOTES
Infectious Disease Progress Note HPI: 
 
 
Ms Tere Perales seen Improved today Says she had some nausea this am but resolved after eating breakfast  
Denied diarrhea Fever improved Did not sleep last night Denied dysuria Medications: No current facility-administered medications on file prior to encounter. Current Outpatient Medications on File Prior to Encounter Medication Sig Dispense Refill  Repatha SureClick pen injection ADMINISTER 1 ML UNDER THE SKIN EVERY 14 DAYS 2 mL 11  
 carvediloL (COREG) 12.5 mg tablet TAKE ONE TABLET BY MOUTH TWICE A DAY 60 Tab 5  
 riTUXimab in 0.9% sodium chloride solution 1,000 mg by IntraVENous route every 6 months.  insulin aspart U-100 (NOVOLOG FLEXPEN U-100 INSULIN) 100 unit/mL (3 mL) inpn by SubCUTAneous route. PER SLIDING SCALE    
 OTHER daily. MOUTH [address] DAILY  multivitamin (ONE A DAY) tablet Take 1 Tab by mouth daily.  dextrose (GLUCOSE GEL PO) Take 2 Tabs by mouth as needed for Other (low blood sugar). As needed  furosemide (LASIX) 40 mg tablet Take 2 Tabs by mouth daily. (Patient taking differently: Take 40 mg by mouth daily. 20 mg daily alternating with 40 mg every other day) 60 Tab 0  clopidogrel (PLAVIX) 75 mg tab TAKE ONE TABLET BY MOUTH DAILY (Patient taking differently: 75 mg daily.) 30 Tab 11  
 insulin degludec (TRESIBA FLEXTOUCH U-100) 100 unit/mL (3 mL) inpn 14 Units by SubCUTAneous route daily.  acetaminophen (TYLENOL) 325 mg tablet Take 2 Tabs by mouth every four (4) hours as needed. (Patient taking differently: Take 1,000 mg by mouth every four (4) hours as needed.) 10 Tab 0  
 nitroglycerin (NITROSTAT) 0.4 mg SL tablet 1 Tab by SubLINGual route as needed for Chest Pain. Up to 3 doses. 40 Tab 0  
 polyethylene glycol (MIRALAX) 17 gram packet Take 1 Packet by mouth daily. 1 Each 0  
 levothyroxine (SYNTHROID) 88 mcg tablet Take 88 mcg by mouth Daily (before breakfast).  aspirin delayed-release 81 mg tablet Take 81 mg by mouth daily.  OMEGA-3 FATTY ACIDS/FISH OIL (OMEGA 3 FISH OIL PO) Take 300 mg by mouth daily.  CHOLECALCIFEROL, VITAMIN D3, (VITAMIN D-3 PO) Take 1,000 Units by mouth daily. Current Facility-Administered Medications:  
  cefepime (MAXIPIME) 2 g in 0.9% sodium chloride (MBP/ADV) 100 mL, 2 g, IntraVENous, Q24H, Eve Pollard DO, Last Rate: 200 mL/hr at 04/21/20 1047, 2 g at 04/21/20 1047   epoetin celio-epbx (RETACRIT) injection 10,000 Units, 10,000 Units, SubCUTAneous, Q MON, WED & Earline John MD, 10,000 Units at 04/20/20 2141   sodium chloride (NS) flush 5-10 mL, 5-10 mL, IntraVENous, PRN, Jackie Arroyo MD 
  aspirin delayed-release tablet 81 mg, 81 mg, Oral, DAILY, Jocelyne Childs MD, 81 mg at 04/21/20 8555   clopidogreL (PLAVIX) tablet 75 mg, 75 mg, Oral, DAILY, Jocelyne Childs MD, 75 mg at 04/21/20 0830   levothyroxine (SYNTHROID) tablet 88 mcg, 88 mcg, Oral, ACB, Jocelyne Childs MD, 88 mcg at 04/21/20 7781   polyethylene glycol (MIRALAX) packet 17 g, 17 g, Oral, DAILY, Jackie Arroyo MD, 17 g at 04/21/20 0830 
  glucose chewable tablet 16 g, 4 Tab, Oral, PRN, Jackie Arroyo MD 
  glucagon [facility]) injection 1 mg, 1 mg, IntraMUSCular, PRN, Jackie Arroyo MD 
  sodium chloride (NS) flush 5-40 mL, 5-40 mL, IntraVENous, Q8H, Jackie Arroyo MD, 10 mL at 04/21/20 3378   sodium chloride (NS) flush 5-40 mL, 5-40 mL, IntraVENous, PRN, Jackie Arroyo MD 
  naloxone [facility]) injection 0.4 mg, 0.4 mg, IntraVENous, PRN, Jackie Arroyo MD 
  dextrose 10% infusion 0-250 mL, 0-250 mL, IntraVENous, PRN, Jackie Arroyo MD 
  insulin glargine (LANTUS) injection 14 Units, 14 Units, SubCUTAneous, QHS, Jocelyne Childs MD, 14 Units at 04/20/20 2139 
  insulin lispro (HUMALOG) injection, , SubCUTAneous, AC&HS, Jocelyne Childs MD, 3 Units at 04/21/20 1246   ondansetron [facility]) injection 4 mg, 4 mg, IntraVENous, Q4H PRN, Jocelyne Childs MD, 4 mg at 04/20/20 0848   acetaminophen (TYLENOL) tablet 650 mg, 650 mg, Oral, Q4H PRN, Danny Allison MD, 650 mg at 04/20/20 2026   furosemide (LASIX) injection 20 mg, 20 mg, IntraVENous, DAILY, Jackie Arroyo MD, 20 mg at 04/21/20 0830 Physical Exam: 
 
Vitals:  
Patient Vitals for the past 24 hrs: 
 Temp Pulse Resp BP SpO2  
04/21/20 1120 99.2 °F (37.3 °C) 99 24 121/66 100 % 04/21/20 1001  (!) 102  112/57   
04/21/20 0955  (!) 106  118/69   
04/21/20 0950  (!) 112  97/57   
04/21/20 0900 97.9 °F (36.6 °C)      
04/21/20 0702 98.1 °F (36.7 °C) (!) 101 18 115/51   
04/21/20 0306 98.6 °F (37 °C) 95 17 100/65 100 % 04/20/20 2301 98.6 °F (37 °C) (!) 109 17 108/59 100 % 04/20/20 2300 98 °F (36.7 °C)      
04/20/20 1900 99.6 °F (37.6 °C) (!) 109 18 136/76 100 % 04/20/20 1630 98.6 °F (37 °C) 94 20 104/61 100 % 04/20/20 1300 98.6 °F (37 °C)     · GEN: NAD 
· HEENT: no scleral icterus,  no thrush · CV: S1, S2 heard regularly, port , no erythema noted on chest wall · Lungs: Clear to auscultation bilaterally · Abdomen: soft, non distended, non tenderGenitourinary · Extremities: no edema · Neuro: Alert, oriented to self, place and situation, moves all extremities to commands, verbal  
· Skin: no rash Labs:  
Recent Results (from the past 24 hour(s)) GLUCOSE, POC Collection Time: 04/20/20  4:33 PM  
Result Value Ref Range Glucose (POC) 213 (H) 65 - 100 mg/dL Performed by Olivia Quinones, POC Collection Time: 04/20/20  9:21 PM  
Result Value Ref Range Glucose (POC) 281 (H) 65 - 100 mg/dL Performed by Lis Nobles   
CBC WITH AUTOMATED DIFF Collection Time: 04/21/20  3:59 AM  
Result Value Ref Range WBC 21.3 (H) 3.6 - 11.0 K/uL  
 RBC 3.03 (L) 3.80 - 5.20 M/uL HGB 9.1 (L) 11.5 - 16.0 g/dL HCT 27.6 (L) 35.0 - 47.0 % MCV 91.1 80.0 - 99.0 FL  
 MCH 30.0 26.0 - 34.0 PG  
 MCHC 33.0 30.0 - 36.5 g/dL RDW 20.1 (H) 11.5 - 14.5 % PLATELET 944 (L) 358 - 400 K/uL MPV 10.9 8.9 - 12.9 FL  
 NRBC 0.1 (H) 0  WBC ABSOLUTE NRBC 0.02 (H) 0.00 - 0.01 K/uL NEUTROPHILS 86 (H) 32 - 75 % LYMPHOCYTES 3 (L) 12 - 49 % MONOCYTES 9 5 - 13 % EOSINOPHILS 0 0 - 7 % BASOPHILS 1 0 - 1 % IMMATURE GRANULOCYTES 1 (H) 0.0 - 0.5 % ABS. NEUTROPHILS 18.4 (H) 1.8 - 8.0 K/UL  
 ABS. LYMPHOCYTES 0.6 (L) 0.8 - 3.5 K/UL  
 ABS. MONOCYTES 1.9 (H) 0.0 - 1.0 K/UL  
 ABS. EOSINOPHILS 0.0 0.0 - 0.4 K/UL  
 ABS. BASOPHILS 0.2 (H) 0.0 - 0.1 K/UL  
 ABS. IMM. GRANS. 0.2 (H) 0.00 - 0.04 K/UL  
 DF SMEAR SCANNED    
 RBC COMMENTS ANISOCYTOSIS 2+ 
    
 RBC COMMENTS OVALOCYTES PRESENT 
    
 RBC COMMENTS MACHO CELLS 
PRESENT 
    
GLUCOSE, POC Collection Time: 04/21/20  7:59 AM  
Result Value Ref Range Glucose (POC) 137 (H) 65 - 100 mg/dL Performed by Gumroadphay P   
GLUCOSE, POC Collection Time: 04/21/20 11:28 AM  
Result Value Ref Range Glucose (POC) 219 (H) 65 - 100 mg/dL Performed by Douangphoumy Spaphay P Microbiology Data: 
  
  Blood: 4/19/20 Component Value Ref Range & Units Status Special Requests: NO SPECIAL REQUESTS    Preliminary Culture result: Abnormal     Preliminary APPARENT GRAM NEGATIVE RODS GROWING IN ALL 4 BOTTLES DRAWN . ..(SITE= RAC AND LAC) Result History CULTURE, BLOOD, PAIRED on 4/20/2020 Urine: 4/19/20 Clean catch; Urine   
    
Component Value Ref Range & Units Status Special Requests: NO SPECIAL REQUESTS    Final  
[place] Count >100,000 COLONIES/mL    Final  
Culture result: KLEBSIELLA PNEUMONIAEAbnormal     Final  
Susceptibility Klebsiella pneumoniae FARHAN Amikacin ($) <=2 ug/mL S Ampicillin ($) >=32 ug/mL R Ampicillin/sulbactam ($) 8 ug/mL S Cefazolin ($) <=4 ug/mL S Cefepime ($$) <=1 ug/mL S Cefoxitin <=4 ug/mL S Ceftazidime ($) <=1 ug/mL S Ceftriaxone ($) <=1 ug/mL S Ciprofloxacin ($) <=0.25 ug/mL S   
Ext. Spec.  Beta Lactamase Negative ug/mL S   
 Gentamicin ($) <=1 ug/mL S Levofloxacin ($) <=0.12 ug/mL S Meropenem ($$) <=0.25 ug/mL S Nitrofurantoin 64 ug/mL I Piperacillin/Tazobac ($) <=4 ug/mL S Tobramycin ($) <=1 ug/mL S Trimeth/Sulfa <=20 ug/mL S   
 
 
 
12/28/19 BLOOD Component Value Ref Range & Units Status Special Requests: NO SPECIAL REQUESTS    Final  
Culture result: NO GROWTH 5 DAYS    Final  
Result History Urine 12/25/19 Urine   
    
Component Value Ref Range & Units Status Special Requests: NO SPECIAL REQUESTS Reflexed from [id number]    Final  
[place] Count >100,000 COLONIES/mL    Final  
Culture result: ESCHERICHIA COLIAbnormal     Final  
Susceptibility Escherichia coli FARHAN Amikacin ($) <=16 ug/mL S Ampicillin ($) <=8 ug/mL S Ampicillin/sulbactam ($) <=8/4 ug/mL S Aztreonam ($$$$) <=4 ug/mL S Cefazolin ($) <=8 ug/mL S Cefepime ($$) <=4 ug/mL S Cefotaxime <=2 ug/mL S Ceftazidime ($) <=1 ug/mL S Ceftriaxone ($) <=1 ug/mL S Cefuroxime ($) <=4 ug/mL S Ciprofloxacin ($) <=1 ug/mL S Gentamicin ($) <=4 ug/mL S Imipenem <=1 ug/mL S Levofloxacin ($) <=2 ug/mL S Meropenem ($$) <=1 ug/mL S Nitrofurantoin <=32 ug/mL S Piperacillin/Tazobac ($) <=16 ug/mL S Tobramycin ($) <=4 ug/mL S Trimeth/Sulfa <=2/38 ug/mL S   
 
 
12/25/19 Blood Blood   
    
Component Value Ref Range & Units Status Special Requests: NO SPECIAL REQUESTS    Final  
Culture result: Abnormal     Final  
ESCHERICHIA COLI GROWING IN ALL 4 BOTTLES DRAWN (SITE=RAC AND L PORT) Culture result: Abnormal     Final  
PRELIMINARY REPORT OF GRAM NEGATIVE RODS GROWING IN 3 OF 4 BOTTLES DRAWN CALLED TO AND READ BACK BY [facility], RN ON 12/26/19 AT [address] Susceptibility Escherichia coli FARHAN Amikacin ($) <=16 ug/mL S Ampicillin ($) <=8 ug/mL S Ampicillin/sulbactam ($) <=8/4 ug/mL S Aztreonam ($$$$) <=4 ug/mL S Cefazolin ($) <=8 ug/mL S Cefepime ($$) <=4 ug/mL S   
 Cefotaxime <=2 ug/mL S Ceftazidime ($) <=1 ug/mL S Ceftriaxone ($) <=1 ug/mL S Cefuroxime ($) <=4 ug/mL S Ciprofloxacin ($) <=1 ug/mL S Gentamicin ($) <=4 ug/mL S Imipenem <=1 ug/mL S Levofloxacin ($) <=2 ug/mL S Meropenem ($$) <=1 ug/mL S Piperacillin/Tazobac ($) <=16 ug/mL S Tobramycin ($) <=4 ug/mL S Trimeth/Sulfa <=2/38 ug/mL S   
 
 
6/20/18 6/21/2018 10:58 AM - Moi, Lab In [facility]  
 
Specimen Information: Blood  
    
Component Value Ref Range & Units Status Special Requests: NO SPECIAL REQUESTS    Final  
Culture result: Abnormal     Final  
STAPHYLOCOCCUS SPECIES, COAGULASE NEGATIVE GROWING IN 1 OF 4 BOTTLES DRAWN (SITE = R ARM) Culture result:    Final  
REMAINING BOTTLE(S) HAS/HAVE NO GROWTH IN 5 DAYS Imaging: TTE 4/20/20 Interpretation Summary · Normal cavity size and wall thickness. Mild-to-moderate systolic function. Estimated left ventricular ejection fraction is 40 - 45%. Abnormal left ventricular wall motion. Age-appropriate left ventricular diastolic function. · Moderately dilated left atrium. · Mildly dilated right atrium. · Color flow Doppler was used. · Aortic valve leaflet calcification present with reduced excursion. Aortic valve mean gradient is 17 mmHg. Aortic valve area is 1.4 cm2. Moderate aortic valve regurgitation is present. · Mitral valve non-specific thickening. Mild to moderate mitral valve regurgitation is present. · Mild to moderate tricuspid valve regurgitation is present. · Moderate pulmonary hypertension. Pulmonary arterial systolic pressure is 55 mmHg. · Small posterior pericardial effusion. Echo Findings Left Ventricle Normal cavity size and wall thickness. Mild-to-moderate systolic dysfunction. The estimated ejection fraction is 40 - 45%. LV wall motion is noted to be abnormal and apex. There is age-appropriate left ventricular diastolic function. Wall Scoring The following segments are hypokinetic: apical anterior, apical inferior and apex. Other segments could not be evaluated. Left Atrium Moderately dilated left atrium. Patent foramen ovale visualized with left to right shunting indicated by color flow Doppler. No closure device present. Right Ventricle Normal cavity size and global systolic function. Right Atrium Mildly dilated right atrium. Interatrial Septum There was a moderate left to right shunt, shown on color Doppler. Aortic Valve Trileaflet valve structure and no stenosis. There is leaflet calcification with reduced excursion. Aortic valve mean gradient is 17 mmHg. Aortic valve area is 1.4 cm2. Moderate aortic valve regurgitation. There is no vegetation on the aortic valve. Mitral Valve No stenosis. Mitral valve non-specific thickening. Mild to moderate regurgitation. There is no vegetation on the mitral valve. Tricuspid Valve Normal valve structure and no stenosis. Mild to moderate regurgitation. There is no vegetation on the tricuspid valve. Pulmonic Valve Pulmonic valve not well visualized. Aorta Normal aortic root. Pulmonary Artery Pulmonary arterial systolic pressure (PASP) is 55 mmHg. Pulmonary hypertension found to be moderate. Pericardium Small posterior pericardial effusion noted. No indications of tamponade present. Interpretation Summary · Normal cavity size and wall thickness. Mild-to-moderate systolic function. Estimated left ventricular ejection fraction is 40 - 45%. Abnormal left ventricular wall motion. Age-appropriate left ventricular diastolic function. · Moderately dilated left atrium. · Mildly dilated right atrium. · Color flow Doppler was used. · Aortic valve leaflet calcification present with reduced excursion. Aortic valve mean gradient is 17 mmHg. Aortic valve area is 1.4 cm2. Moderate aortic valve regurgitation is present. · Mitral valve non-specific thickening. Mild to moderate mitral valve regurgitation is present. · Mild to moderate tricuspid valve regurgitation is present. · Moderate pulmonary hypertension. Pulmonary arterial systolic pressure is 55 mmHg. · Small posterior pericardial effusion. CXR 4/19/20 PA and lateral views demonstrate normal heart size. Persistent mild diffuse 
interstitial prominence again noted. Osseous metastatic disease is unchanged. Port-A-Cath is able in position. Calcified granuloma again noted in the right 
apex. Old left rib fractures. 
  
IMPRESSION Impression: Persistent mild diffuse interstitial prominence again noted and 
grossly unchanged compared to 12/31/2019. Stable osseous metastatic disease. Assessment / Plan:  
 
Ms Citlalli Nolen Is a 82-year-old lady with a history of metastatic breast cancer with an indwelling port status post chemotherapy about a week ago, rheumatoid arthritis, coronary artery disease, osteoporosis, systolic heart failure, colon cancer, diabetes, E. coli bacteremia in December 2019 who presents with fatigue, fevers, feeling cold and nausea. During this admission, noted to have a T-max of 102.9 and initially hypotensive with a systolic blood pressure in the 80s. She was started on ceftriaxone IV. Labs show uptrending leukocytosis with neutrophilic predominance. She also has some lymphocytopenia. Her chest x-ray has been read as \" persistent mild diffuse interstitial prominence again noted and grossly unchanged compared to 12/31/2019. Stable osseous metastatic disease. \" Her blood cultures dated 4/19/2020 is positive for gram-negative arturo in all 4 bottles obtained from right AC and left AC. Urine culture from 4/19/2020 also has been in the 100,000 gram-negative rods. Her urine analysis is positive for large leukocyte Estrace, negative nitrates, 2+ bacteria and greater than 100 white count. Of note, she had E. coli grew out of blood cultures dated 12/25/19 and the site has been indicated as  \"Right AC and left port \". Repeat blood culture on 12/28/2020 was negative. A transthoracic echo from 12/27/2019 was read as aortic valve sclerosis moderate regurgitation of the mitral valve. Tricuspid and pulmonic valves were read as normal valve structure. It appears that she was treated with IV antibiotics initially and then transition to oral Cipro to complete a 14-day course of antibiotic therapy to be stopped on 1/7/2020. 
 
 
1) gram-negative arturo bacteremia in the setting of chemotherapy a week ago with an indwelling port Of note she had E. coli growing in her blood cultures in December 2019. This was reported to be right before meals and left port as site. Her port however is on the right chest area Recommendations IV cefepime renally dosed by pharmacy Repeat blood cultures Check blood cultures every 24-48 hours pending clearance I am concerned that her port will likely need to be removed given  recurrence of bacteremia with a gram-negative arturo and an indwelling port which could be seeded A transthoracic echo was read as no vegetation on aortic, tricuspid and mitral valve. Pulmonic valve could not be visualized well Side effects of antibiotics discussed with the patient including nausea vomiting diarrhea risk for C. difficile, seizures 2) metastatic breast cancer Reviewed her last oncology note from 1/6/2020. \" Has had nice response to chemo (abraxane/xeloda) with recent documented decrease in hepatic and axillary involvement. .. her last rx was 12/16 (cycle 5 ) she received 7 days of xeloda ending on 12/22. Amado lara on 12/23\" 3) coronary artery disease, systolic dysfunction Plans per cardiology Noted she is on Plavix 4) rheumatoid arthritis We will need to clarify if she is getting a DMARD or any immunosuppressive medication which increases the risk for infections 5) marked leukocytosis likely in the setting of bacteremia Continue to monitor as high risk for C. difficile infection as well which can present with marked leukocytosis 6) history of colon cancer per chart 7) osteoporosis per chart 8) DVT prophylaxis per primary team, noted she is on Plavix Discussed above recommendations with primary team today Thank for the opportunity to participate in the care of this patient. Please contact with questions or concerns.   
 
Chapin Ford DO 
12:57 PM

## 2020-04-21 NOTE — PROGRESS NOTES
Primary Nurse Nery Sauer RN performed a dual skin assessment on this patient No impairment noted Calderon score is 17.

## 2020-04-21 NOTE — PROGRESS NOTES
Problem: Heart Failure: Day 3 Goal: Treatments/Interventions/Procedures Outcome: Progressing Towards Goal 
No s/s of excess fluid volume. Lungs are clear. Pt is on lasix. Problem: Pressure Injury - Risk of 
Goal: *Prevention of pressure injury Description: Document Calderon Scale and appropriate interventions in the flowsheet. Outcome: Progressing Towards Goal 
Patient turned every two hours, moisture barrier applied, heels elevated, pillows and blankets used, pressure redistributing mattress , linens dry. Problem: Falls - Risk of 
Goal: *Absence of Falls Description: Document [place]y [place] Fall Risk and appropriate interventions in the flowsheet. Outcome: Progressing Towards Goal 
Pt remains free of falls during admission. Call bell and frequently used items within reach. Bedside table within reach. Pt provided nonskid socks and instructed to call out for nurse when in need of asssitance 0730 Bedside shift change report given to Tamanna (oncoming nurse) by Myles Crawford (offgoing nurse). Report included the following information SBAR, Kardex, Intake/Output, MAR, Recent Results and Cardiac Rhythm NSR.

## 2020-04-21 NOTE — PROGRESS NOTES
TRANSFER - IN REPORT: 
 
Verbal report received from [facility] RN(name) on [address]  being received from 4 IMCU(unit) for routine progression of care Report consisted of patients Situation, Background, Assessment and  
Recommendations(SBAR). Information from the following report(s) SBAR was reviewed with the receiving nurse. Opportunity for questions and clarification was provided. Assessment completed upon patients arrival to unit and care assumed.

## 2020-04-21 NOTE — PROGRESS NOTES
Problem: Mobility Impaired (Adult and Pediatric) Goal: *Acute Goals and Plan of Care (Insert Text) Description: FUNCTIONAL STATUS PRIOR TO ADMISSION: Patient was modified independent using a rolling walker for functional mobility. Pt utilizes w/c for community mobility. HOME SUPPORT PRIOR TO ADMISSION: Patient lived with sons and has HHA approx 8 hours/day for 7 days/wk. Pt received assistance for ADLs and IADLs. Physical Therapy Goals Initiated 4/21/2020 1. Patient will scoot up and down and roll side to side in bed with modified independence within 7 day(s). 2.  Patient will transfer from bed to chair and chair to bed with modified independence using the least restrictive device within 7 day(s). 3.  Patient will perform sit to stand with modified independence within 7 day(s). 4.  Patient will ambulate with supervision/set-up for 300 feet with the least restrictive device within 7 day(s). 5.  Patient will ascend/descend 2 stairs with both handrail(s) with minimal assistance/contact guard assist within 7 day(s). Outcome: Progressing Towards Goal 
 PHYSICAL THERAPY EVALUATION Patient: Rusty Noble (82 y.o. female) Date: 4/21/2020 Primary Diagnosis: Systemic inflammatory response syndrome (HCC) [R65.10] UTI (urinary tract infection) [N39.0] Precautions:     
 
 
ASSESSMENT Based on the objective data described below, the patient presents with decreased activity tolerance, balance deficits, and generalized weakness. Pt reports she ambulates at home and reports recent weakness. Pt with appropriate BP with positional changes. Pt ascended from EOB with initial min A due to difficulty achieving standing position demonstrating improvements and requiring less assistance throughout session. Pt ambulated to chair with fair steadiness.  Pt rested then performed additional trial of ambulation requiring intermittent cues for RW management. Pt then performed seated exercises to continue to perform in between sessions to prevent deconditioning. Current Level of Function Impacting Discharge (mobility/balance): min A-CGA for transfers and ambulation Functional Outcome Measure: The patient scored Total Score: 12/28 on the Tinetti outcome measure which is indicative of high fall risk. Other factors to consider for discharge: high fall risk, has HHA and lives with sons Patient will benefit from skilled therapy intervention to address the above noted impairments. PLAN : 
Recommendations and Planned Interventions: bed mobility training, transfer training, gait training, therapeutic exercises, neuromuscular re-education, patient and family training/education, and therapeutic activities Frequency/Duration: Patient will be followed by physical therapy:  5 times a week to address goals. Recommendation for discharge: (in order for the patient to meet his/her long term goals) Physical therapy at least 2 days/week in the home AND ensure assist and/or supervision for safety with mobility, ADLs, and IADLs (pt has HHA) This discharge recommendation: 
Has not yet been discussed the attending provider and/or case management IF patient discharges home will need the following DME: patient owns DME required for discharge SUBJECTIVE:  
Patient stated oh my, I got weak.  OBJECTIVE DATA SUMMARY:  
HISTORY:   
Past Medical History:  
Diagnosis Date Acute on chronic systolic HF (heart failure) ([facility] [address] 5/19/2018 Age-related osteoporosis without current pathological fracture 9/2/2009 Anemia 9/2/2009 Asymptomatic hyperuricemia 2/16/2017 Breast cancer ([facility] [address] CAD (coronary artery disease) 6/21/2018 Carotid bruit 8/31/2011 CHF (congestive heart failure) ([facility] [address] CKD (chronic kidney disease) stage 3, GFR 30-59 ml/min ([facility]) 10/25/2017 Colon cancer ([facility] [address] 9/2/2009  
 surgery/chemo [facility] cancer ([facility] 9/2/2009 DM (diabetes mellitus) ([facility] [address] 9/2/2009 GERD (gastroesophageal reflux disease) H/O: CVA 9/2/2009  
 slight l sided weakness Heart attack ([address] HTN, goal below 140/90 9/2/2009 Hypothyroid 9/2/2009 Ill-defined condition   
 seasonal allergies Long-term use of immunosuppressant medication 11/21/2016 Metastatic breast cancer ([facility] [address] 6/1/2018 Microalbuminuria 9/2/2009 Osteoporosis 9/2/2009 Other and unspecified hyperlipidemia 1/27/2010 PAD (peripheral artery disease) ([facility] [address] 9/7/2011 Primary osteoarthritis of both knees 9/28/2016 Rheumatoid arthritis involving ankle ([facility] [address] 9/28/2016 Rheumatoid arthritis(714.0) 9/2/2009 Seropositive rheumatoid arthritis of multiple sites ([facility] [address] 9/28/2016 Statin intolerance 12/21/2016 Type 2 diabetes mellitus with neurologic complication, with long-term current use of insulin ([facility] [address] 9/2/2009 Type 2 diabetes with nephropathy ([facility] [address] 8/20/2018 Unspecified essential hypertension 9/2/2009 Vitamin D deficiency 6/16/2017 Weakness due to cerebrovascular accident Past Surgical History:  
Procedure Laterality Date HX COLECTOMY HX HYSTERECTOMY HX OTHER SURGICAL    
 colonoscopies numerous since 1993 Personal factors and/or comorbidities impacting plan of care: PMH Home Situation Home Environment: Private residence # Steps to Enter: 4 Rails to Enter: Yes Hand Rails : Left(1 person on R side) One/Two Story Residence: Two story, live on 1st floor Living Alone: No 
Support Systems: Home care staff(7 days/wk for 8 AM-3 PM, 6/30 PM-7 PM) Patient Expects to be Discharged to[de-identified] Private residence Current DME Used/Available at Home: Dallas Leach, Wheelchair, [address] chair Tub or Shower Type: Shower EXAMINATION/PRESENTATION/DECISION MAKING:  
Critical Behavior: 
Neurologic State: Appropriate for age Orientation Level: Oriented X4 
 Cognition: Appropriate for age attention/concentration Hearing: Auditory Auditory Impairment: Hard of hearing, bilateral 
Hearing Aids/Status: Bilateral 
Skin:  intact Edema: none noted Range Of Motion: 
AROM: Within functional limits PROM: Within functional limits Strength:   
Strength: Generally decreased, functional 
 
Functional Mobility: 
Bed Mobility: 
  
Supine to Sit: Minimum assistance;Bed Modified Scooting: Supervision Transfers: 
Sit to Stand: Minimum assistance; Additional time; Adaptive equipment;Contact guard assistance Stand to Sit: Contact guard assistance; Adaptive equipment; Additional time Balance:  
Sitting: Intact; Without support Standing: Impaired; With support Standing - Static: Good Standing - Dynamic : Fair;Constant support Ambulation/Gait Training: 
Distance (ft): 5 Feet (ft)(+5) Assistive Device: Gait belt;Walker, rolling Ambulation - Level of Assistance: Minimal assistance; Adaptive equipment; Additional time Gait Abnormalities: Decreased step clearance;Trunk sway increased; Path deviations Base of Support: Narrowed Stance: Weight shift Speed/Haylee: Shuffled; Slow Step Length: Left shortened;Right shortened Swing Pattern: Left asymmetrical;Right asymmetrical 
 
   
 
Therapeutic Exercises: Pt performed following bilaterally x10 while sitting in chair: 
-ankle pumps 
-LAQ 
-marches 
-shoulder shrugs 
-shoulder flex 
-shoulder abd with elbow flexed Functional Measure: 
Tinetti test: 
 
Sitting Balance: 1 Arises: 1 Attempts to Rise: 1 Immediate Standing Balance: 1 Standing Balance: 1 Nudged: 0 Eyes Closed: 1 Turn 360 Degrees - Continuous/Discontinuous: 0 Turn 360 Degrees - Steady/Unsteady: 0 Sitting Down: 1 Balance Score: 7 Balance total score Indication of Gait: 0 
R Step Length/Height: 0 
L Step Length/Height: 0 
R Foot Clearance: 1 L Foot Clearance: 1 Step Symmetry: 0 Step Continuity: 0 Path: 1 Trunk: 1 Walking Time: 1 Gait Score: 5 Gait total score Total Score: 12/28 Overall total score Tinetti Tool Score Risk of Falls 
<19 = High Fall Risk 19-24 = Moderate Fall Risk 25-28 = Low Fall Risk Bala GAMEZ. Performance-Oriented Assessment of Mobility Problems in Elderly Patients. [facility]; [id number]. (Scoring Description: PT Bulletin Feb. 10, 1993) Older adults: Margarette Jose Angel et al, 2009; n = [address] [facility] elderly evaluated with ABC, SHANNON, ADL, and IADL) · Mean SHANNON score for males aged 69-68 years = 26.21(3.40) · Mean SHANNON score for females age 69-68 years = 25.16(4.30) · Mean SHANNON score for males over 80 years = 23.29(6.02) · Mean SHANNON score for females over 80 years = 17.20(8.32) Physical Therapy Evaluation Charge Determination History Examination Presentation Decision-Making MEDIUM  Complexity : 1-2 comorbidities / personal factors will impact the outcome/ POC  LOW Complexity : 1-2 Standardized tests and measures addressing body structure, function, activity limitation and / or participation in recreation  LOW Complexity : Stable, uncomplicated  LOW Complexity : FOTO score of  Based on the above components, the patient evaluation is determined to be of the following complexity level: LOW Activity Tolerance:  
Good Please refer to the flowsheet for vital signs taken during this treatment. After treatment patient left in no apparent distress:  
Sitting in chair, Call bell within reach, and Bed / chair alarm activated COMMUNICATION/EDUCATION:  
The patients plan of care was discussed with: Registered nurse. Fall prevention education was provided and the patient/caregiver indicated understanding., Patient/family have participated as able in goal setting and plan of care. , and Patient/family agree to work toward stated goals and plan of care. Thank you for this referral. 
Ilya Elizondo, PT, DPT Time Calculation: 32 mins

## 2020-04-21 NOTE — PROGRESS NOTES
Hematology-Oncology Progress Note Sandy Ledesma 
1935 
[id number] 
4/21/2020 Follow-up for: metastatic breast cancer [x]        Chart notes since last visit reviewed [x]        Medications reviewed for allergies and interactions Case discussed with the following:         []            
               []        Nursing Staff  
                                                                      []        Pathologist 
                                                                      []        FAMILY Subjective:  
 
Spoke with patient who complains of: she is feeling a little better, no c/o dysuria. No pain, no sob Objective:  
 
Patient Vitals for the past 24 hrs: 
 BP Temp Pulse Resp SpO2 Height Weight 04/21/20 500 [place] 37 5' 1\" (1.549 m) 62.9 kg (138 lb 10.7 oz) 04/21/20 1120 121/66 99.2 °F (37.3 °C) 99 24 100 %    
04/21/20 1001 112/57  (!) 102      
04/21/20 0955 118/69  (!) 106      
04/21/20 0950 97/57  (!) 112      
04/21/20 0900  97.9 °F (36.6 °C)       
04/21/20 0702 115/51 98.1 °F (36.7 °C) (!) 101 18   62.9 kg (138 lb 10.7 oz) 04/21/20 0306 100/65 98.6 °F (37 °C) 95 17 100 %    
04/20/20 2301 108/59 98.6 °F (37 °C) (!) 109 17 100 %    
04/20/20 2300  98 °F (36.7 °C)       
04/20/20 1900 136/76 99.6 °F (37.6 °C) (!) 109 18 100 %    
04/20/20 1630 104/61 98.6 °F (37 °C) 94 20 100 %   REVIEW OF SYSTEMS:   
Constitutional: negative fever, negative chills, negative weight loss Eyes:   negative visual changes ENT:   negative sore throat, tongue or lip swelling Respiratory:  negative cough, negative dyspnea Cards:  negative for chest pain, palpitations, lower extremity edema GI:   negative for nausea, vomiting, diarrhea, and abdominal pain Neuro:  negative for headaches, dizziness, vertigo 
[]                        Full ROS o/w normal/non contributor Constitutional:  Patient looks []        Sick []        Frail 
[x]        Better                                                
[]        Depressed HEENT:  [x]   NC                         []   AT               [x]    ALOPECIA Eyes: [x]   Normal               []    Icteric Oropharynx: [x]    Normal                  []  Thrush               []   Dry Mucositis: [x]    None                 Grade: []        I  []        II  []        III  []        IV Neck:   [x]   Supple                  []  Rigid JVD:    [x]   ABSENT       []   PRESENT Lymphadenopathy:   []   None Noted            []   PRESENT 
 
           []  EDGE - palp Mass:   []   ABSENT                          []  PRESENT Extr:    []  Lymphedema             []   Cyanosis      []  Clubbing Edema:     [x]   NONE       []   PRESENT Skin:  Intact [x]           Purpura [] Rash: [x]   ABSENT       []  PRESENT Neuro: 
[x]        Normal 
[]        Confused Available labs reviewed: 
Labs:   
Recent Results (from the past 24 hour(s)) GLUCOSE, POC Collection Time: 04/20/20  4:33 PM  
Result Value Ref Range Glucose (POC) 213 (H) 65 - 100 mg/dL Performed by Smita Lopez, POC Collection Time: 04/20/20  9:21 PM  
Result Value Ref Range Glucose (POC) 281 (H) 65 - 100 mg/dL Performed by Likeeds   
CBC WITH AUTOMATED DIFF Collection Time: 04/21/20  3:59 AM  
Result Value Ref Range WBC 21.3 (H) 3.6 - 11.0 K/uL  
 RBC 3.03 (L) 3.80 - 5.20 M/uL HGB 9.1 (L) 11.5 - 16.0 g/dL HCT 27.6 (L) 35.0 - 47.0 % MCV 91.1 80.0 - 99.0 FL  
 MCH 30.0 26.0 - 34.0 PG  
 MCHC 33.0 30.0 - 36.5 g/dL RDW 20.1 (H) 11.5 - 14.5 % PLATELET 466 (L) 189 - 400 K/uL MPV 10.9 8.9 - 12.9 FL  
 NRBC 0.1 (H) 0  WBC ABSOLUTE NRBC 0.02 (H) 0.00 - 0.01 K/uL NEUTROPHILS 86 (H) 32 - 75 % LYMPHOCYTES 3 (L) 12 - 49 % MONOCYTES 9 5 - 13 % EOSINOPHILS 0 0 - 7 % BASOPHILS 1 0 - 1 % IMMATURE GRANULOCYTES 1 (H) 0.0 - 0.5 % ABS. NEUTROPHILS 18.4 (H) 1.8 - 8.0 K/UL  
 ABS. LYMPHOCYTES 0.6 (L) 0.8 - 3.5 K/UL  
 ABS. MONOCYTES 1.9 (H) 0.0 - 1.0 K/UL  
 ABS. EOSINOPHILS 0.0 0.0 - 0.4 K/UL  
 ABS. BASOPHILS 0.2 (H) 0.0 - 0.1 K/UL  
 ABS. IMM. GRANS. 0.2 (H) 0.00 - 0.04 K/UL  
 DF SMEAR SCANNED    
 RBC COMMENTS ANISOCYTOSIS 2+ 
    
 RBC COMMENTS OVALOCYTES PRESENT 
    
 RBC COMMENTS MACHO CELLS 
PRESENT 
    
GLUCOSE, POC Collection Time: 04/21/20  7:59 AM  
Result Value Ref Range Glucose (POC) 137 (H) 65 - 100 mg/dL Performed by Douangphoumy Spaphay P   
GLUCOSE, POC Collection Time: 04/21/20 11:28 AM  
Result Value Ref Range Glucose (POC) 219 (H) 65 - 100 mg/dL Performed by Douangphoumy Spaphay P Available Xrays reviewed: 
 
Chemotherapy monitored and toxicities assessed: 
 
Assessment and Plan 1. Met. Breast cancer. . currently on abraxane/xeloda. Joesph Radford q 28 day, was due to start next cycle next week. . will probably hold. She is responding to Rx with documented improvement in adenopathy, liver mets on scans done this spring 2. Anemia. secodary to chemo continue procrit 3. Gram negative bacteremia. Joesph Radford improving on cefipime. . 
 would like to get her home soon. Joesph Radford awaiting culture and sensitivities 4. Leukocytosis. Silviann Magic secondary to neulasta and #3,, improving

## 2020-04-21 NOTE — PROGRESS NOTES
Hospitalist Progress Note Anthony Patterson MD 
Answering service: [phone] OR [phone] from in house phone Date of Service:  2020 NAME:  Pipe Malhotra :  1935 MRN:  [id number] Admission Summary:  
85F p/w UTI and sepsis/Bacteremia Interval history / Subjective:  
Patient seen and examined at bedside, feels well, continues to feel improvement. No acute events. Slept well. Final culture results still pending. Assessment & Plan: #. UTI: UA suggestive, Urine cultures GNR. Empiric iv abx- broadened to Cefepime #. Bacteremia: likely 2nd to above. BCs growing GNR #. Sepsis: POA, with leucocytosis and fever. Lactic wnl, ID following. #. mild renal insufficiency: on CKD. Monitor, avoid nephrotoxins #. HypoKalemia: Acute, replace, monitor #. HypoNatremia: mild, monitor #. CAD: stable, home regimen, monitor #. Systolic CHF: chronic, no acute decompensation. #. Metastatic breast Ca: on chemo, recent Neulasta given, Oncology following #. Protein- calorie malnutrition: Severe, POA- very poor appetite, nutritional cs, supplements, monitor Code status: Full DVT prophylaxis: SCD Care Plan discussed with: Patient/Family and Nurse Disposition: TBD Hospital Problems  Date Reviewed: 2020 Codes Class Noted POA  
 UTI (urinary tract infection) ICD-10-CM: N39.0 ICD-9-CM: 599.0  2020 Unknown Systemic inflammatory response syndrome (HCC) ICD-10-CM: R65.10 ICD-9-CM: 995.90  2020 Unknown Review of Systems:  
Pertinent items are mentioned in interval history. Vital Signs:  
 Last 24hrs VS reviewed since prior progress note. Most recent are: 
Visit Vitals /51 (BP 1 Location: Right arm, BP Patient Position: At rest) Pulse (!) 101 Temp 98.1 °F (36.7 °C) Resp 18 Ht 5' 1\" (1.549 m) Wt 62.9 kg (138 lb 10.7 oz) SpO2 100% BMI 26.20 kg/m² Intake/Output Summary (Last 24 hours) at 4/21/2020 3216 Last data filed at 4/21/2020 4099 Gross per 24 hour Intake 340 ml Output 650 ml Net -310 ml Physical Examination:  
General:  Alert, oriented, No acute distress, pleasant [place] [facility] lady Resp:  No accessory muscle use, Good AE, no wheezes, no rhonchi Abd:  Soft, non-tender, non-distended Extremities:  No cyanosis or clubbing, no significant edema Neuro:  Grossly normal, no focal neuro deficits, follows commands Psych:  Good insight, AAOx3, not agitated. Data Review:  
 Review and/or order of clinical lab test 
Review and/or order of tests in the radiology section of CPT Review and/or order of tests in the medicine section of CPT Labs:  
 
Recent Labs  
  04/21/20 
0359 04/20/20 0423 WBC 21.3* 30.5* HGB 9.1* 9.7* HCT 27.6* 31.0*  
* 112* Recent Labs  
  04/20/20 
0625 04/20/20 
0423 04/19/20 
1038  133* 136  
K 3.4* 3.9 3.7  105 103 CO2 22 QUANTITY NOT SUFFICIENT. SUGGEST RECOLLECTION 25 BUN 39* 40* 37* CREA 1.40* 1.48* 1.40* * 214* 238* CA 8.6 8.4* 8.4* MG 1.9 QUANTITY NOT SUFFICIENT. SUGGEST RECOLLECTION  --   
PHOS 2.9 QUANTITY NOT SUFFICIENT. SUGGEST RECOLLECTION  --   
 
Recent Labs  
  04/20/20 0423 SGOT 35 ALT 31  
* TBILI 0.7 TP 6.4 ALB 2.5*  
GLOB 3.9 No results for input(s): INR, PTP, APTT, INREXT, INREXT in the last 72 hours. No results for input(s): FE, TIBC, PSAT, FERR in the last 72 hours. No results found for: FOL, RBCF No results for input(s): PH, PCO2, PO2 in the last 72 hours. No results for input(s): CPK, CKNDX, TROIQ in the last 72 hours. No lab exists for component: CPKMB Lab Results Component Value Date/Time Cholesterol, total 74 04/16/2018 04:21 AM  
 HDL Cholesterol 25 04/16/2018 04:21 AM  
 LDL, calculated 31.6 04/16/2018 04:21 AM  
 Triglyceride 87 04/16/2018 04:21 AM  
 CHOL/HDL Ratio 3.0 04/16/2018 04:21 AM  
 
Lab Results Component Value Date/Time Glucose (POC) 137 (H) 04/21/2020 07:59 AM  
 Glucose (POC) 281 (H) 04/20/2020 09:21 PM  
 Glucose (POC) 213 (H) 04/20/2020 04:33 PM  
 Glucose (POC) 265 (H) 04/20/2020 11:24 AM  
 Glucose (POC) 170 (H) 04/20/2020 08:08 AM  
 
Lab Results Component Value Date/Time Color YELLOW/STRAW 04/19/2020 11:12 AM  
 Appearance TURBID (A) 04/19/2020 11:12 AM  
 Specific gravity 1.009 04/19/2020 11:12 AM  
 pH (UA) 5.5 04/19/2020 11:12 AM  
 Protein 30 (A) 04/19/2020 11:12 AM  
 Glucose Negative 04/19/2020 11:12 AM  
 Ketone Negative 04/19/2020 11:12 AM  
 Bilirubin Negative 04/19/2020 11:12 AM  
 Urobilinogen 0.2 04/19/2020 11:12 AM  
 Nitrites Negative 04/19/2020 11:12 AM  
 Leukocyte Esterase LARGE (A) 04/19/2020 11:12 AM  
 Epithelial cells FEW 04/19/2020 11:12 AM  
 Bacteria 2+ (A) 04/19/2020 11:12 AM  
 WBC >100 (H) 04/19/2020 11:12 AM  
 RBC 5-10 04/19/2020 11:12 AM  
 
Medications Reviewed:  
 
Current Facility-Administered Medications Medication Dose Route Frequency  cefepime (MAXIPIME) 2 g in 0.9% sodium chloride (MBP/ADV) 100 mL  2 g IntraVENous Q24H  
 epoetin celio-epbx (RETACRIT) injection 10,000 Units  10,000 Units SubCUTAneous Q MON, WED & FRI  sodium chloride (NS) flush 5-10 mL  5-10 mL IntraVENous PRN  
 aspirin delayed-release tablet 81 mg  81 mg Oral DAILY  clopidogreL (PLAVIX) tablet 75 mg  75 mg Oral DAILY  levothyroxine (SYNTHROID) tablet 88 mcg  88 mcg Oral ACB  polyethylene glycol (MIRALAX) packet 17 g  17 g Oral DAILY  glucose chewable tablet 16 g  4 Tab Oral PRN  
 glucagon (GLUCAGEN) injection 1 mg  1 mg IntraMUSCular PRN  
 sodium chloride (NS) flush 5-40 mL  5-40 mL IntraVENous Q8H  
 sodium chloride (NS) flush 5-40 mL  5-40 mL IntraVENous PRN  
 naloxone (NARCAN) injection 0.4 mg  0.4 mg IntraVENous PRN  
 dextrose 10% infusion 0-250 mL  0-250 mL IntraVENous PRN  
 insulin glargine (LANTUS) injection 14 Units  14 Units SubCUTAneous QHS  insulin lispro (HUMALOG) injection   SubCUTAneous AC&HS  ondansetron (ZOFRAN) injection 4 mg  4 mg IntraVENous Q4H PRN  
 acetaminophen (TYLENOL) tablet 650 mg  650 mg Oral Q4H PRN  
 furosemide (LASIX) injection 20 mg  20 mg IntraVENous DAILY  
______________________________________________________________________ EXPECTED LENGTH OF STAY: 3d 16h ACTUAL LENGTH OF STAY:          2 Adilson Seo MD

## 2020-04-22 PROBLEM — R78.81 BACTEREMIA DUE TO GRAM-NEGATIVE BACTERIA: Status: ACTIVE | Noted: 2020-01-01

## 2020-04-22 NOTE — PROGRESS NOTES
Problem: Falls - Risk of 
Goal: *Absence of Falls Description: Document Charli Gomez Fall Risk and appropriate interventions in the flowsheet. Outcome: Progressing Towards Goal 
Note: Fall Risk Interventions: 
Mobility Interventions: Communicate number of staff needed for ambulation/transfer, OT consult for ADLs, Patient to call before getting OOB, PT Consult for mobility concerns, Strengthening exercises (ROM-active/passive) Medication Interventions: Evaluate medications/consider consulting pharmacy, Patient to call before getting OOB Elimination Interventions: Call light in reach, Patient to call for help with toileting needs, Toileting schedule/hourly rounds Problem: Patient Education: Go to Patient Education Activity Goal: Patient/Family Education Outcome: Progressing Towards Goal 
  
Problem: Pressure Injury - Risk of 
Goal: *Prevention of pressure injury Description: Document Calderon Scale and appropriate interventions in the flowsheet. Outcome: Progressing Towards Goal 
Note: Pressure Injury Interventions: 
  
 
Moisture Interventions: Apply protective barrier, creams and emollients, Absorbent underpads, Maintain skin hydration (lotion/cream) Activity Interventions: Increase time out of bed, Pressure redistribution bed/mattress(bed type), PT/OT evaluation Mobility Interventions: HOB 30 degrees or less, Pressure redistribution bed/mattress (bed type), PT/OT evaluation Nutrition Interventions: Document food/fluid/supplement intake, Offer support with meals,snacks and hydration Friction and Shear Interventions: Apply protective barrier, creams and emollients, Minimize layers Problem: Patient Education: Go to Patient Education Activity Goal: Patient/Family Education Outcome: Progressing Towards Goal

## 2020-04-22 NOTE — PROGRESS NOTES
NUTRITION brief Additional consult received for \"general nutrition management\" Pt assessed by this service yesterday (4/21). Please refer to my note from that time for more details. Diet was adjusted and supplements added. Again, attempted to call pt on phone for interview, but she did not answer. Recommendations remain as below. Will continue to follow along and monitor for PO adequacy and supplement acceptance. RECOMMENDATIONS:  
1. Strict oral hygiene. Hx of mouth sores with chemo. Add Magic Mouthwash PRN 2. Resume home calcium and Vit D supplements Akhila JONATHAN Waters Available via [employer]

## 2020-04-22 NOTE — CONSULTS
Surgery Consult Subjective: Mandie Baig is a 82 y.o. female with a history of multiple medical problems to include heart disease, colon cancer, chronic kidney disease, and diabetes, with metastatic breast cancer treated initially with Abraxane and currently Xeloda (last treatment approximately 1 week ago). She was admitted 3 days ago with fever to 102 degrees, chills, and malaise. She was also initially hypotensive. She was noted to have positive urine and blood cultures with multiple gram-negative rods to include E. coli and K pneumoniae. White blood cell count has improved, as has fever curve. She is tolerating a diabetic diet. She denies ongoing chills, abdominal pain, or diarrhea. She has not been out of bed since admission. Patient Active Problem List  
 Diagnosis Date Noted  UTI (urinary tract infection) 04/19/2020  Systemic inflammatory response syndrome ([facility] 04/19/2020  Septic shock ([facility] 12/25/2019  Dizziness 08/25/2019  Type 2 diabetes with nephropathy ([facility] 08/20/2018  CAD (coronary artery disease) 06/21/2018  Metastatic breast cancer ([facility] 06/01/2018  Acute on chronic systolic HF (heart failure) ([facility] 05/19/2018  CKD (chronic kidney disease) stage 3, GFR 30-59 ml/min ([facility]) 10/25/2017  Vitamin D deficiency 06/16/2017  Asymptomatic hyperuricemia 02/16/2017  Statin intolerance 12/21/2016  Long-term use of immunosuppressant medication 11/21/2016  Seropositive rheumatoid arthritis of multiple sites ([facility] 09/28/2016  Primary osteoarthritis of both knees 09/28/2016  Weakness due to cerebrovascular accident 04/02/2012  PAD (peripheral artery disease) ([facility] 09/07/2011  Carotid bruit 08/31/2011  Hyperlipidemia 01/27/2010  Type 2 diabetes mellitus with neurologic complication, with long-term current use of insulin ([address] 09/02/2009  Colon cancer ([facility] 09/02/2009  Age-related osteoporosis without current pathological fracture 09/02/2009  
 HTN, goal below 140/90 09/02/2009  Anemia 09/02/2009 Past Medical History:  
Diagnosis Date  Acute on chronic systolic HF (heart failure) ([facility] 5/19/2018  Age-related osteoporosis without current pathological fracture 9/2/2009  Anemia 9/2/2009  Asymptomatic hyperuricemia 2/16/2017  Breast cancer ([facility]  CAD (coronary artery disease) 6/21/2018  Carotid bruit 8/31/2011  CHF (congestive heart failure) ([facility]  CKD (chronic kidney disease) stage 3, GFR 30-59 ml/min ([facility]) 10/25/2017  Colon cancer ([facility] 9/2/2009  
 surgery/chemo  Colon cancer ([facility] 9/2/2009  DM (diabetes mellitus) ([facility] 9/2/2009  GERD (gastroesophageal reflux disease)  H/O: CVA 9/2/2009  
 slight l sided weakness  Heart attack ([address]  HTN, goal below 140/90 9/2/2009  Hypothyroid 9/2/2009  Ill-defined condition   
 seasonal allergies  Long-term use of immunosuppressant medication 11/21/2016  Metastatic breast cancer ([address] 6/1/2018  Microalbuminuria 9/2/2009  Osteoporosis 9/2/2009  Other and unspecified hyperlipidemia 1/27/2010  PAD (peripheral artery disease) ([address] 9/7/2011  Primary osteoarthritis of both knees 9/28/2016  Rheumatoid arthritis involving ankle ([address] 9/28/2016  Rheumatoid arthritis(714.0) 9/2/2009  Seropositive rheumatoid arthritis of multiple sites ([address] 9/28/2016  Statin intolerance 12/21/2016  Type 2 diabetes mellitus with neurologic complication, with long-term current use of insulin ([address] 9/2/2009  Type 2 diabetes with nephropathy ([address] 8/20/2018  Unspecified essential hypertension 9/2/2009  Vitamin D deficiency 6/16/2017  Weakness due to cerebrovascular accident Past Surgical History:  
Procedure Laterality Date  HX COLECTOMY  HX HYSTERECTOMY  HX OTHER SURGICAL    
 colonoscopies numerous since 1993 Social History Tobacco Use  Smoking status: Never Smoker  Smokeless tobacco: Never Used Substance Use Topics  Alcohol use: No  
  
Family History Problem Relation Age of Onset  Diabetes Mother  Stroke Mother  Diabetes Sister  Other Sister   
     fell and hit her head -  of this  Diabetes Brother  Cancer Father   
     stomach  Diabetes Sister Current Facility-Administered Medications Medication Dose Route Frequency  cefepime (MAXIPIME) 2 g in 0.9% sodium chloride (MBP/ADV) 100 mL  2 g IntraVENous Q24H  
 epoetin celio-epbx (RETACRIT) injection 10,000 Units  10,000 Units SubCUTAneous Q MON, WED & FRI  sodium chloride (NS) flush 5-10 mL  5-10 mL IntraVENous PRN  
 aspirin delayed-release tablet 81 mg  81 mg Oral DAILY  clopidogreL (PLAVIX) tablet 75 mg  75 mg Oral DAILY  levothyroxine (SYNTHROID) tablet 88 mcg  88 mcg Oral ACB  polyethylene glycol (MIRALAX) packet 17 g  17 g Oral DAILY  glucose chewable tablet 16 g  4 Tab Oral PRN  
 glucagon (GLUCAGEN) injection 1 mg  1 mg IntraMUSCular PRN  
 sodium chloride (NS) flush 5-40 mL  5-40 mL IntraVENous Q8H  
 sodium chloride (NS) flush 5-40 mL  5-40 mL IntraVENous PRN  
 naloxone (NARCAN) injection 0.4 mg  0.4 mg IntraVENous PRN  
 dextrose 10% infusion 0-250 mL  0-250 mL IntraVENous PRN  
 insulin glargine (LANTUS) injection 14 Units  14 Units SubCUTAneous QHS  insulin lispro (HUMALOG) injection   SubCUTAneous AC&HS  ondansetron (ZOFRAN) injection 4 mg  4 mg IntraVENous Q4H PRN  
 acetaminophen (TYLENOL) tablet 650 mg  650 mg Oral Q4H PRN  
 furosemide (LASIX) injection 20 mg  20 mg IntraVENous DAILY Allergies Allergen Reactions  Statins-Hmg-Coa Reductase Inhibitors Other (comments) Intolerant to statins  Sulfa (Sulfonamide Antibiotics) Other (comments)  
  syncope Review of Systems: A complete review of systems is negative except as noted in the HPI. Objective:  
 
  
Visit Vitals /66 (BP 1 Location: Right arm, BP Patient Position: At rest) Pulse 95 Temp 97.5 °F (36.4 °C) Resp 17 Ht 5' 1\" (1.549 m) Wt 138 lb 10.7 oz (62.9 kg) SpO2 99% BMI 26.20 kg/m² Physical Exam: 
GENERAL: alert, cooperative, no distress, appears older than stated age, EYE: negative, LYMPH NODES: Cervical, supraclavicular nodes normal. THROAT & NECK: normal, LUNG/CHEST: clear to auscultation bilaterally, right upper chest subcutaneous port which is nontender and without cellulitis of the overlying skin. HEART: regular rate and rhythm, S1, S2 normal, no murmur. ABDOMEN: soft, non-tender. Bowel sounds normal. No masses,  no organomegaly, EXTREMITIES: Trace pedal and pretibial edema bilaterally. , SKIN: No rash., NEUROLOGIC: negative Imaging:  CXR: PA and lateral views demonstrate normal heart size. Persistent mild diffuse 
interstitial prominence again noted. Osseous metastatic disease is unchanged. Port-A-Cath is able in position. Calcified granuloma again noted in the right apex. Old left rib fractures. 
  
IMPRESSION Impression: Persistent mild diffuse interstitial prominence again noted and grossly unchanged compared to 12/31/2019. Stable osseous metastatic disease. Lab/Data Review: 
Recent Results (from the past 24 hour(s)) GLUCOSE, POC Collection Time: 04/21/20 11:28 AM  
Result Value Ref Range Glucose (POC) 219 (H) 65 - 100 mg/dL Performed by Bharat JONAS   
GLUCOSE, POC Collection Time: 04/21/20  4:50 PM  
Result Value Ref Range Glucose (POC) 187 (H) 65 - 100 mg/dL Performed by Chante Aden GLUCOSE, POC Collection Time: 04/21/20  9:46 PM  
Result Value Ref Range Glucose (POC) 236 (H) 65 - 100 mg/dL Performed by Lorri Doshi METABOLIC PANEL, BASIC Collection Time: 04/22/20  2:49 AM  
Result Value Ref Range Sodium 135 (L) 136 - 145 mmol/L Potassium 4.2 3.5 - 5.1 mmol/L  Chloride 106 97 - 108 mmol/L  
 CO2 22 21 - 32 mmol/L Anion gap 7 5 - 15 mmol/L Glucose 187 (H) 65 - 100 mg/dL BUN 41 (H) 6 - 20 MG/DL Creatinine 1.42 (H) 0.55 - 1.02 MG/DL  
 BUN/Creatinine ratio 29 (H) 12 - 20 GFR est AA 43 (L) >60 ml/min/1.73m2 GFR est non-AA 35 (L) >60 ml/min/1.73m2 Calcium 8.4 (L) 8.5 - 10.1 MG/DL  
CBC W/O DIFF Collection Time: 04/22/20  2:49 AM  
Result Value Ref Range WBC 18.2 (H) 3.6 - 11.0 K/uL  
 RBC 2.97 (L) 3.80 - 5.20 M/uL HGB 9.0 (L) 11.5 - 16.0 g/dL HCT 26.9 (L) 35.0 - 47.0 % MCV 90.6 80.0 - 99.0 FL  
 MCH 30.3 26.0 - 34.0 PG  
 MCHC 33.5 30.0 - 36.5 g/dL RDW 20.3 (H) 11.5 - 14.5 % PLATELET 434 380 - 655 K/uL MPV 10.5 8.9 - 12.9 FL  
 NRBC 0.1 (H) 0  WBC ABSOLUTE NRBC 0.02 (H) 0.00 - 0.01 K/uL GLUCOSE, POC Collection Time: 04/22/20  7:25 AM  
Result Value Ref Range Glucose (POC) 166 (H) 65 - 100 mg/dL Performed by Filipe Colón Results Procedure Component Value Units Date/Time CULTURE, BLOOD, PAIRED [[id number]] Collected:  04/21/20 1024 Order Status:  Completed Specimen:  Blood Updated:  04/21/20 1056 URINE CULTURE HOLD SAMPLE [[id number]] Collected:  04/19/20 1112 Order Status:  Completed Specimen:  Urine from Serum Updated:  04/19/20 1119 Urine culture hold Urine on hold in Microbiology dept for 2 days. If unpreserved urine is submitted, it cannot be used for addtional testing after 24 hours, recollection will be required. CULTURE, URINE [[id number]]  (Abnormal)  (Susceptibility) Collected:  04/19/20 1112 Order Status:  Completed Specimen:  Urine from Clean catch Updated:  04/21/20 6107 Special Requests: NO SPECIAL REQUESTS [place] Count --     
  >100,000 COLONIES/mL Culture result: KLEBSIELLA PNEUMONIAE Susceptibility Klebsiella pneumoniae FARHAN Amikacin ($) Susceptible Ampicillin ($) Resistant Ampicillin/sulbactam ($) Susceptible Cefazolin ($) Susceptible Cefepime ($$) Susceptible Cefoxitin Susceptible Ceftazidime ($) Susceptible Ceftriaxone ($) Susceptible Ciprofloxacin ($) Susceptible Ext. Spec. Beta Lactamase Susceptible Gentamicin ($) Susceptible Levofloxacin ($) Susceptible Meropenem ($$) Susceptible Nitrofurantoin Intermediate Piperacillin/Tazobac ($) Susceptible Tobramycin ($) Susceptible Trimeth/Sulfa Susceptible CULTURE, BLOOD, PAIRED [[id number]]  (Abnormal)  (Susceptibility) Collected:  04/19/20 1038 Order Status:  Completed Specimen:  Blood Updated:  04/22/20 0710 Special Requests: NO SPECIAL REQUESTS Culture result:    
  KLEBSIELLA PNEUMONIAE GROWING IN ALL 4 BOTTLES DRAWN (SITES = RAC AND LAC) Susceptibility Klebsiella pneumoniae FARHAN Amikacin ($) Susceptible Ampicillin ($) Resistant Ampicillin/sulbactam ($) Susceptible Cefazolin ($) Susceptible Cefepime ($$) Susceptible Cefoxitin Susceptible Ceftazidime ($) Susceptible Ceftriaxone ($) Susceptible Ciprofloxacin ($) Susceptible Ext. Spec. Beta Lactamase Susceptible Gentamicin ($) Susceptible Levofloxacin ($) Susceptible Meropenem ($$) Susceptible Piperacillin/Tazobac ($) Susceptible Tobramycin ($) Susceptible Trimeth/Sulfa Susceptible Assessment:  
 
Urinary tract infection with bacteremia in setting of metastatic breast cancer with port in place. Satisfactory response to intravenous antibiotics. Given the presence of the port, and positive blood cultures, she will be unlikely to clear infection with port left in place. Plan: 1. I recommend proceeding with Port removal in AM. 2. Discussed aspects of surgical intervention, methods, risks (including by not limited to infection, bleeding, hematoma, and need for future port), and the risks of the anesthetic. The patient understands the risks; all questions were answered to the patient's satisfaction. 3. Patient does wish to proceed with surgery. Continue antibiotics. Recommended patient be out of bed today to decrease pressure wound risk. She will be n.p.o. after midnight.

## 2020-04-22 NOTE — PROGRESS NOTES
Problem: Self Care Deficits Care Plan (Adult) Goal: *Acute Goals and Plan of Care (Insert Text) Description:  
FUNCTIONAL STATUS PRIOR TO ADMISSION: Patient was modified independent using a rolling walker short distances for functional mobility, wheelchair use for community mobility. Patient required moderate assistance for basic and instrumental ADLs from caregivers. HOME SUPPORT: The patient lived with family but has caregivers 8 hours/day, 7 days/week that assist with ADLs & IADLs. Occupational Therapy Goals Initiated 4/22/2020 1. Patient will perform grooming at the sink with supervision/set-up within 7 day(s). 2.  Patient will perform seated bathing with moderate assistance  within 7 day(s). 3.  Patient will perform toilet transfers with supervision/set-up within 7 day(s). 4.  Patient will perform all aspects of toileting with supervision/set-up within 7 day(s). 5.  Patient will participate in upper extremity therapeutic exercise/activities with supervision/set-up for 5 minutes within 7 day(s). 6.  Patient will utilize energy conservation techniques during functional activities with verbal and visual cues within 7 day(s). Outcome: Progressing Towards Goal 
  
OCCUPATIONAL THERAPY EVALUATION Patient: Levy Flower (82 y.o. female) Date: 4/22/2020 Primary Diagnosis: Systemic inflammatory response syndrome (HCC) [R65.10] UTI (urinary tract infection) [N39.0] Procedure(s) (LRB): REMOVAL PORT A CATH (ESSENTIAL) (IV SEDATION) (N/A) Precautions: Fall ASSESSMENT Based on the objective data described below, the patient presents with decreased balance, activity tolerance, safety awareness, strength, and endurance s/p admission for SIRS & UTI. PTA, patient received assist for ADLs/IADLs, ambulatory with RW. She now presents below this baseline, requiring increased time and up to Mod A for mobility to/from bathroom. Anticipate good progress as she is nearing her recent baseline, recommend d/c home with HHOT and continued caregiver/family assist PRN. Current Level of Function Impacting Discharge (ADLs/self-care): setup for upper body ADLs, Mod-Total A for lower body ADLs, and Min-Mod A for mobility Functional Outcome Measure: The patient scored Total: 45/100 on the Barthel Index outcome measure which is indicative of 55% impaired ability to care for basic self needs/dependency on others; inferred 100% dependency on others for instrumental ADLs. Other factors to consider for discharge: fall risk, PMH, PLOF, good caregiver support at home Patient will benefit from skilled therapy intervention to address the above noted impairments. PLAN : 
Recommendations and Planned Interventions: self care training, functional mobility training, therapeutic exercise, balance training, therapeutic activities, endurance activities, patient education, home safety training, and family training/education Frequency/Duration: Patient will be followed by occupational therapy 3 times a week to address goals. Recommendation for discharge: (in order for the patient to meet his/her long term goals) Occupational therapy at least 2 days/week in the home AND ensure assist and/or supervision for safety with ADLs and mobility This discharge recommendation: 
Has not yet been discussed the attending provider and/or case management IF patient discharges home will need the following DME: Anticipate none SUBJECTIVE:  
Patient stated Well I can do it sometimes but it's not always comfortable to do.  re: lower body ADLs OBJECTIVE DATA SUMMARY:  
HISTORY:  
Past Medical History:  
Diagnosis Date Acute on chronic systolic HF (heart failure) ([facility] 5/19/2018 Age-related osteoporosis without current pathological fracture 9/2/2009 Anemia 9/2/2009 Asymptomatic hyperuricemia 2/16/2017 Breast cancer ([facility] CAD (coronary artery disease) 6/21/2018 Carotid bruit 8/31/2011 CHF (congestive heart failure) ([facility] CKD (chronic kidney disease) stage 3, GFR 30-59 ml/min ([facility]) 10/25/2017 Colon cancer ([facility] 9/2/2009  
 surgery/chemo Colon cancer ([facility] 9/2/2009 DM (diabetes mellitus) ([address] 9/2/2009 GERD (gastroesophageal reflux disease) H/O: CVA 9/2/2009  
 slight l sided weakness Heart attack ([address] HTN, goal below 140/90 9/2/2009 Hypothyroid 9/2/2009 Ill-defined condition   
 seasonal allergies Long-term use of immunosuppressant medication 11/21/2016 Metastatic breast cancer ([address] 6/1/2018 Microalbuminuria 9/2/2009 Osteoporosis 9/2/2009 Other and unspecified hyperlipidemia 1/27/2010 PAD (peripheral artery disease) ([facility] 9/7/2011 Primary osteoarthritis of both knees 9/28/2016 Rheumatoid arthritis involving ankle ([address] 9/28/2016 Rheumatoid arthritis(714.0) 9/2/2009 Seropositive rheumatoid arthritis of multiple sites ([facility] 9/28/2016 Statin intolerance 12/21/2016 Type 2 diabetes mellitus with neurologic complication, with long-term current use of insulin ([address] 9/2/2009 Type 2 diabetes with nephropathy ([facility] 8/20/2018 Unspecified essential hypertension 9/2/2009 Vitamin D deficiency 6/16/2017 Weakness due to cerebrovascular accident Past Surgical History:  
Procedure Laterality Date HX COLECTOMY HX HYSTERECTOMY HX OTHER SURGICAL    
 colonoscopies numerous since 1993 Expanded or extensive additional review of patient history:  
 
Home Situation Home Environment: Private residence # Steps to Enter: 4 Rails to Enter: Yes Hand Rails : Left(1 person on R side) One/Two Story Residence: Two story, live on 1st floor Living Alone: No 
Support Systems: Home care staff(7 days/wk for 8 AM-3 PM, 6/30 PM-7 PM) Patient Expects to be Discharged to[de-identified] Private residence Current DME Used/Available at Home: Raffy Lento, rolling, Walker, rollator, Grab bars, Shower chair, Safety frame toliet(transport wheelchair, built in shower bench) Tub or Shower Type: Shower Hand dominance: Right EXAMINATION OF PERFORMANCE DEFICITS: 
Cognitive/Behavioral Status: 
Neurologic State: Alert Orientation Level: Oriented X4 Cognition: Appropriate for age attention/concentration; Follows commands Perception: Appears intact Perseveration: No perseveration noted Safety/Judgement: Awareness of environment;Decreased awareness of need for safety Skin: Appears intact Edema: None Hearing: Auditory Auditory Impairment: Hard of hearing, bilateral 
Hearing Aids/Status: Bilateral 
 
Vision/Perceptual:   
Tracking: Able to track stimulus in all quadrants w/o difficulty Acuity: Within Defined Limits Range of Motion: 
AROM: Generally decreased, functional 
  
  
  
  
  
  
  
 
Strength: 
Strength: Generally decreased, functional 
  
  
  
  
 
Coordination: 
Coordination: Within functional limits Fine Motor Skills-Upper: Left Intact; Right Intact Gross Motor Skills-Upper: Left Intact; Right Intact Tone & Sensation: 
  
Sensation: Impaired(N/T in arjun hands, some in arjun feet (baseline DM)) Balance: 
Sitting: Intact Standing: Impaired; With support(RW) 
Standing - Static: Good Standing - Dynamic : Good Functional Mobility and Transfers for ADLs: 
Bed Mobility: 
Supine to Sit: Minimum assistance; Additional time Sit to Supine: Moderate assistance; Additional time Scooting: Stand-by assistance Transfers: 
Sit to Stand: Minimum assistance;Contact guard assistance Stand to Sit: Contact guard assistance Bed to Chair: Contact guard assistance Bathroom Mobility: Contact guard assistance Toilet Transfer : Minimum assistance; Adaptive equipment(grab bar & RW) Assistive Device : Gait Belt;Walker, rolling(grab bar) ADL Assessment: Feeding: Independent Oral Facial Hygiene/Grooming: Stand-by assistance(infer standing at sink vs seated at sink for EC) Bathing: Moderate assistance Upper Body Dressing: Setup Lower Body Dressing: Moderate assistance Toileting: Moderate assistance ADL Intervention and task modifications: 
  
 
Grooming Washing Hands: (gloves worn, educated on hand hygiene) Cues: Verbal cues provided Lower Body Dressing Assistance Dressing Assistance: Moderate assistance Protective Undergarmet: Moderate assistance; Compensatory technique training Socks: Total assistance (dependent) Leg Crossed Method Used: No 
Position Performed: Seated in chair;Standing Cues: Verbal cues provided;Visual cues provided; Tactile cues provided;Physical assistance Toileting Toileting Assistance: Moderate assistance Bladder Hygiene: Supervision Clothing Management: Moderate assistance Cues: Verbal cues provided;Visual cues provided Adaptive Equipment: Grab bars; Salazar Malloy Cognitive Retraining Safety/Judgement: Awareness of environment;Decreased awareness of need for safety Therapeutic Exercise: 
Ambulation to/from bathroom with CGA-Min A and RW support, light-heavy Min A for sit<>stand x3 Functional Measure: 
Barthel Index: 
 
Bathin Bladder: 5 Bowels: 5 Groomin Dressin Feeding: 10 Mobility: 0 Stairs: 0 Toilet Use: 5 Transfer (Bed to Chair and Back): 10 Total: 45/100 The Barthel ADL Index: Guidelines 1. The index should be used as a record of what a patient does, not as a record of what a patient could do. 2. The main aim is to establish degree of independence from any help, physical or verbal, however minor and for whatever reason. 3. The need for supervision renders the patient not independent. 4. A patient's performance should be established using the best available evidence.  Asking the patient, friends/relatives and nurses are the usual sources, but direct observation and common sense are also important. However direct testing is not needed. 5. Usually the patient's performance over the preceding 24-48 hours is important, but occasionally longer periods will be relevant. 6. Middle categories imply that the patient supplies over 50 per cent of the effort. 7. Use of aids to be independent is allowed. Ronnell Trujillo, Barthel, D.W. (1436). Functional evaluation: the Barthel Index. [address] (14)2. Nic Goddardr sandra SANDY McgovernF, Madeline Reardon., Dauna [place]., Orlene Sero, [facility] (1999). Measuring the change indisability after inpatient rehabilitation; comparison of the responsiveness of the Barthel Index and Functional Luce Measure. Journal of Neurology, Neurosurgery, and Psychiatry, 66(4), 986-627. MYRA Perez, ANDREA Ramos, & Kari Bloom M.A. (2004.) Assessment of post-stroke quality of life in cost-effectiveness studies: The usefulness of the Barthel Index and the EuroQoL-5D. [facility], 13, [phone] Occupational Therapy Evaluation Charge Determination History Examination Decision-Making LOW Complexity : Brief history review  LOW Complexity : 1-3 performance deficits relating to physical, cognitive , or psychosocial skils that result in activity limitations and / or participation restrictions  LOW Complexity : No comorbidities that affect functional and no verbal or physical assistance needed to complete eval tasks Based on the above components, the patient evaluation is determined to be of the following complexity level: LOW Pain Rating: 
None reported Activity Tolerance:  
Fair and requires rest breaks Please refer to the flowsheet for vital signs taken during this treatment. After treatment patient left in no apparent distress:   
Supine in bed, Call bell within reach, and Side rails x 3 
 
COMMUNICATION/EDUCATION:  
The patients plan of care was discussed with: Registered nurse. Home safety education was provided and the patient/caregiver indicated understanding., Patient/family have participated as able in goal setting and plan of care. , and Patient/family agree to work toward stated goals and plan of care. This patients plan of care is appropriate for delegation to ESTHER. Thank you for this referral. 
ESTRELLA Valerio, OTR/L Time Calculation: 25 mins

## 2020-04-22 NOTE — PROGRESS NOTES
04/21/20 2015 Vital Signs Temp (!) 101.3 °F (38.5 °C) Temp Source Oral  
Pulse (Heart Rate) (!) 102 Heart Rate Source Monitor Resp Rate 18  
O2 Sat (%) 96 % Level of Consciousness Alert /65 MAP (Calculated) 87 BP 1 Method Automatic  
BP 1 Location Right arm BP Patient Position At rest  
MEWS Score 4 Patient Observation Repositioned Head of bed elevated (degrees) Patient Turned Turns self;Supine Activity In bed;Resting quietly; Watching t.v.  
 
Will administer 650mg of Tylenol

## 2020-04-22 NOTE — PROGRESS NOTES
Infectious Disease Progress Note HPI: 
 
 
Ms Main Pittman seen Improved but still have intermittent fevers Denied diarrhea, nausea, vomiting Denied dysuria , chills, night sweats Medications: No current facility-administered medications on file prior to encounter. Current Outpatient Medications on File Prior to Encounter Medication Sig Dispense Refill  furosemide (Lasix) 40 mg tablet Take 40 mg by mouth every other day. Alternating with 80 mg every other day  furosemide (Lasix) 40 mg tablet Take 80 mg by mouth every other day. Alternating with 40 mg every other day  evolocumab (Repatha SureClick) pen injection 204 mg by SubCUTAneous route every fourteen (14) days.  capecitabine (Xeloda) 500 mg tablet 500 mg two (2) times a day. X 7 days then off for 14 days  clopidogreL (Plavix) 75 mg tab Take 75 mg by mouth daily.  carvediloL (COREG) 12.5 mg tablet TAKE ONE TABLET BY MOUTH TWICE A DAY 60 Tab 5  
 riTUXimab in 0.9% sodium chloride solution 1,000 mg by IntraVENous route every 6 months.  insulin aspart U-100 (NOVOLOG FLEXPEN U-100 INSULIN) 100 unit/mL (3 mL) inpn by SubCUTAneous route Before breakfast, lunch, dinner and at bedtime. PER SLIDING SCALE     
 multivitamin (ONE A DAY) tablet Take 1 Tab by mouth daily.  insulin degludec (TRESIBA FLEXTOUCH U-100) 100 unit/mL (3 mL) inpn 14 Units by SubCUTAneous route daily.  nitroglycerin (NITROSTAT) 0.4 mg SL tablet 1 Tab by SubLINGual route as needed for Chest Pain. Up to 3 doses. 40 Tab 0  
 levothyroxine (SYNTHROID) 88 mcg tablet Take 88 mcg by mouth Daily (before breakfast).  aspirin delayed-release 81 mg tablet Take 81 mg by mouth daily.  OMEGA-3 FATTY ACIDS/FISH OIL (OMEGA 3 FISH OIL PO) Take 300 mg by mouth daily.  CHOLECALCIFEROL, VITAMIN D3, (VITAMIN D-3 PO) Take 1,000 Units by mouth daily. Current Facility-Administered Medications:   senna-docusate (PERICOLACE) 8.6-50 mg per tablet 1 Tab, 1 Tab, Oral, DAILY, Rajinder Tavera MD, 1 Tab at 04/22/20 1150   cefepime (MAXIPIME) 2 g in 0.9% sodium chloride (MBP/ADV) 100 mL, 2 g, IntraVENous, Q24H, Eve Pollard DO, Last Rate: 200 mL/hr at 04/22/20 1155, 2 g at 04/22/20 1155   epoetin celio-epbx (RETACRIT) injection 10,000 Units, 10,000 Units, SubCUTAneous, Q MON, WED & Taylor Johnston MD, 10,000 Units at 04/20/20 2141   sodium chloride (NS) flush 5-10 mL, 5-10 mL, IntraVENous, PRN, Jackie Arroyo MD 
  aspirin delayed-release tablet 81 mg, 81 mg, Oral, DAILY, Freida Ramsey MD, 81 mg at 04/22/20 1498   clopidogreL (PLAVIX) tablet 75 mg, 75 mg, Oral, DAILY, Freida Ramesy MD, 75 mg at 04/22/20 9786   levothyroxine (SYNTHROID) tablet 88 mcg, 88 mcg, Oral, ACB, Freida Ramsey MD, 88 mcg at 04/22/20 4317   polyethylene glycol (MIRALAX) packet 17 g, 17 g, Oral, DAILY, Jackie Arroyo MD, 17 g at 04/21/20 0830 
  glucose chewable tablet 16 g, 4 Tab, Oral, PRN, Jackie Arroyo MD 
  glucagon [facility]) injection 1 mg, 1 mg, IntraMUSCular, PRN, Jackie Arroyo MD 
  sodium chloride (NS) flush 5-40 mL, 5-40 mL, IntraVENous, Q8H, Freida Ramsey MD, 10 mL at 04/22/20 9144   sodium chloride (NS) flush 5-40 mL, 5-40 mL, IntraVENous, PRN, Jackie Arroyo MD 
  naloxone [facility]) injection 0.4 mg, 0.4 mg, IntraVENous, PRN, Jackie Arroyo MD 
  dextrose 10% infusion 0-250 mL, 0-250 mL, IntraVENous, PRN, Jackie Arroyo MD 
  insulin glargine (LANTUS) injection 14 Units, 14 Units, SubCUTAneous, QHS, Freida Ramsey MD, 14 Units at 04/21/20 2215   insulin lispro (HUMALOG) injection, , SubCUTAneous, AC&HS, Freida Ramsey MD, 3 Units at 04/22/20 1248   ondansetron [facility]) injection 4 mg, 4 mg, IntraVENous, Q4H PRN, Freida Ramsey MD, 4 mg at 04/20/20 1105   acetaminophen (TYLENOL) tablet 650 mg, 650 mg, Oral, Q4H PRN, Freida Ramsey MD, 650 mg at 04/21/20 2029   furosemide (LASIX) injection 20 mg, 20 mg, IntraVENous, DAILY, Freida Ramsey MD, 20 mg at 04/22/20 5006 Physical Exam: 
 
Vitals:  
Patient Vitals for the past 24 hrs: 
 Temp Pulse Resp BP SpO2  
04/22/20 0828 97.5 °F (36.4 °C) 95 17 106/66 99 % 04/22/20 0247 99.3 °F (37.4 °C) 95 18 129/75 95 % 04/21/20 2126 98.1 °F (36.7 °C)      
04/21/20 2015 (!) 101.3 °F (38.5 °C) (!) 102 18 130/65 96 % 04/21/20 1850 98.6 °F (37 °C) 93 18 152/77 95 % 04/21/20 1659 99 °F (37.2 °C)      
04/21/20 1612 99.4 °F (37.4 °C) (!) 102 18 125/72 100 % 04/21/20 1600     100 % · GEN: NAD 
· HEENT: no scleral icterus,  no thrush · CV: S1, S2 heard regularly, port , no erythema noted on chest wall · Lungs: Clear to auscultation bilaterally · Abdomen: soft, non distended, non tender · Extremities: no edema · Neuro: Alert, oriented to self, place and situation, moves all extremities to commands, verbal  
· Skin: no rash Labs:  
Recent Results (from the past 24 hour(s)) GLUCOSE, POC Collection Time: 04/21/20  4:50 PM  
Result Value Ref Range Glucose (POC) 187 (H) 65 - 100 mg/dL Performed by Belinda Schroeder GLUCOSE, POC Collection Time: 04/21/20  9:46 PM  
Result Value Ref Range Glucose (POC) 236 (H) 65 - 100 mg/dL Performed by Ashutosh Tello METABOLIC PANEL, BASIC Collection Time: 04/22/20  2:49 AM  
Result Value Ref Range Sodium 135 (L) 136 - 145 mmol/L Potassium 4.2 3.5 - 5.1 mmol/L Chloride 106 97 - 108 mmol/L  
 CO2 22 21 - 32 mmol/L Anion gap 7 5 - 15 mmol/L Glucose 187 (H) 65 - 100 mg/dL BUN 41 (H) 6 - 20 MG/DL Creatinine 1.42 (H) 0.55 - 1.02 MG/DL  
 BUN/Creatinine ratio 29 (H) 12 - 20 GFR est AA 43 (L) >60 ml/min/1.73m2 GFR est non-AA 35 (L) >60 ml/min/1.73m2 Calcium 8.4 (L) 8.5 - 10.1 MG/DL  
CBC W/O DIFF Collection Time: 04/22/20  2:49 AM  
Result Value Ref Range WBC 18.2 (H) 3.6 - 11.0 K/uL  
 RBC 2.97 (L) 3.80 - 5.20 M/uL HGB 9.0 (L) 11.5 - 16.0 g/dL HCT 26.9 (L) 35.0 - 47.0 %  MCV 90.6 80.0 - 99.0 FL  
 MCH 30.3 26.0 - 34.0 PG  
 MCHC 33.5 30.0 - 36.5 g/dL RDW 20.3 (H) 11.5 - 14.5 % PLATELET 544 333 - 205 K/uL MPV 10.5 8.9 - 12.9 FL  
 NRBC 0.1 (H) 0  WBC ABSOLUTE NRBC 0.02 (H) 0.00 - 0.01 K/uL GLUCOSE, POC Collection Time: 04/22/20  7:25 AM  
Result Value Ref Range Glucose (POC) 166 (H) 65 - 100 mg/dL Performed by Ashutosh Tello GLUCOSE, POC Collection Time: 04/22/20 11:23 AM  
Result Value Ref Range Glucose (POC) 249 (H) 65 - 100 mg/dL Performed by Claude Huntington Microbiology Data: 
 
Blood 4/21/20 pending Blood: 4/19/20 Blood   
    
Component Value Ref Range & Units Status Special Requests: NO SPECIAL REQUESTS    Final  
Culture result: Abnormal     Final  
KLEBSIELLA PNEUMONIAE GROWING IN ALL 4 BOTTLES DRAWN (SITES = RAC AND LAC) Susceptibility Klebsiella pneumoniae FARHAN Amikacin ($) <=2 ug/mL S Ampicillin ($) >=32 ug/mL R Ampicillin/sulbactam ($) 8 ug/mL S Cefazolin ($) <=4 ug/mL S Cefepime ($$) <=1 ug/mL S Cefoxitin <=4 ug/mL S Ceftazidime ($) <=1 ug/mL S Ceftriaxone ($) <=1 ug/mL S Ciprofloxacin ($) <=0.25 ug/mL S   
Ext. Spec. Beta Lactamase Negative ug/mL S Gentamicin ($) <=1 ug/mL S Levofloxacin ($) <=0.12 ug/mL S Meropenem ($$) <=0.25 ug/mL S Piperacillin/Tazobac ($) <=4 ug/mL S Tobramycin ($) <=1 ug/mL S Trimeth/Sulfa <=20 ug/mL S Urine: 4/19/20 Clean catch; Urine   
    
Component Value Ref Range & Units Status Special Requests: NO SPECIAL REQUESTS    Final  
Vici Count >100,000 COLONIES/mL    Final  
Culture result: KLEBSIELLA PNEUMONIAEAbnormal     Final  
Susceptibility Klebsiella pneumoniae FARHAN Amikacin ($) <=2 ug/mL S Ampicillin ($) >=32 ug/mL R Ampicillin/sulbactam ($) 8 ug/mL S Cefazolin ($) <=4 ug/mL S Cefepime ($$) <=1 ug/mL S Cefoxitin <=4 ug/mL S Ceftazidime ($) <=1 ug/mL S Ceftriaxone ($) <=1 ug/mL S   
 Ciprofloxacin ($) <=0.25 ug/mL S   
Ext. Spec. Beta Lactamase Negative ug/mL S Gentamicin ($) <=1 ug/mL S Levofloxacin ($) <=0.12 ug/mL S Meropenem ($$) <=0.25 ug/mL S Nitrofurantoin 64 ug/mL I Piperacillin/Tazobac ($) <=4 ug/mL S Tobramycin ($) <=1 ug/mL S Trimeth/Sulfa <=20 ug/mL S   
 
 
 
12/28/19 BLOOD Component Value Ref Range & Units Status Special Requests: NO SPECIAL REQUESTS    Final  
Culture result: NO GROWTH 5 DAYS    Final  
Result History Urine 12/25/19 Urine   
    
Component Value Ref Range & Units Status Special Requests: NO SPECIAL REQUESTS Reflexed from [id number]    Final  
[place] Count >100,000 COLONIES/mL    Final  
Culture result: ESCHERICHIA COLIAbnormal     Final  
Susceptibility Escherichia coli FARHAN Amikacin ($) <=16 ug/mL S Ampicillin ($) <=8 ug/mL S Ampicillin/sulbactam ($) <=8/4 ug/mL S Aztreonam ($$$$) <=4 ug/mL S Cefazolin ($) <=8 ug/mL S Cefepime ($$) <=4 ug/mL S Cefotaxime <=2 ug/mL S Ceftazidime ($) <=1 ug/mL S Ceftriaxone ($) <=1 ug/mL S Cefuroxime ($) <=4 ug/mL S Ciprofloxacin ($) <=1 ug/mL S Gentamicin ($) <=4 ug/mL S Imipenem <=1 ug/mL S Levofloxacin ($) <=2 ug/mL S Meropenem ($$) <=1 ug/mL S Nitrofurantoin <=32 ug/mL S Piperacillin/Tazobac ($) <=16 ug/mL S Tobramycin ($) <=4 ug/mL S Trimeth/Sulfa <=2/38 ug/mL S   
 
 
12/25/19 Blood Blood   
    
Component Value Ref Range & Units Status Special Requests: NO SPECIAL REQUESTS    Final  
Culture result: Abnormal     Final  
ESCHERICHIA COLI GROWING IN ALL 4 BOTTLES DRAWN (SITE=RAC AND L PORT) Culture result: Abnormal     Final  
PRELIMINARY REPORT OF GRAM NEGATIVE RODS GROWING IN 3 OF 4 BOTTLES DRAWN CALLED TO AND READ BACK BY [address] [place] [address] RN ON 12/26/19 AT 5335. 31 Mari Mullins Susceptibility Escherichia coli FARHAN Amikacin ($) <=16 ug/mL S Ampicillin ($) <=8 ug/mL S Ampicillin/sulbactam ($) <=8/4 ug/mL S   
 Aztreonam ($$$$) <=4 ug/mL S Cefazolin ($) <=8 ug/mL S Cefepime ($$) <=4 ug/mL S Cefotaxime <=2 ug/mL S Ceftazidime ($) <=1 ug/mL S Ceftriaxone ($) <=1 ug/mL S Cefuroxime ($) <=4 ug/mL S Ciprofloxacin ($) <=1 ug/mL S Gentamicin ($) <=4 ug/mL S Imipenem <=1 ug/mL S Levofloxacin ($) <=2 ug/mL S Meropenem ($$) <=1 ug/mL S Piperacillin/Tazobac ($) <=16 ug/mL S Tobramycin ($) <=4 ug/mL S Trimeth/Sulfa <=2/38 ug/mL S   
 
 
6/20/18 6/21/2018 10:58 AM - Moi, Lab In [facility]  
 
Specimen Information: Blood  
    
Component Value Ref Range & Units Status Special Requests: NO SPECIAL REQUESTS    Final  
Culture result: Abnormal     Final  
STAPHYLOCOCCUS SPECIES, COAGULASE NEGATIVE GROWING IN 1 OF 4 BOTTLES DRAWN (SITE = R ARM) Culture result:    Final  
REMAINING BOTTLE(S) HAS/HAVE NO GROWTH IN 5 DAYS Imaging: TTE 4/20/20 Interpretation Summary · Normal cavity size and wall thickness. Mild-to-moderate systolic function. Estimated left ventricular ejection fraction is 40 - 45%. Abnormal left ventricular wall motion. Age-appropriate left ventricular diastolic function. · Moderately dilated left atrium. · Mildly dilated right atrium. · Color flow Doppler was used. · Aortic valve leaflet calcification present with reduced excursion. Aortic valve mean gradient is 17 mmHg. Aortic valve area is 1.4 cm2. Moderate aortic valve regurgitation is present. · Mitral valve non-specific thickening. Mild to moderate mitral valve regurgitation is present. · Mild to moderate tricuspid valve regurgitation is present. · Moderate pulmonary hypertension. Pulmonary arterial systolic pressure is 55 mmHg. · Small posterior pericardial effusion. Echo Findings Left Ventricle Normal cavity size and wall thickness. Mild-to-moderate systolic dysfunction. The estimated ejection fraction is 40 - 45%.  LV wall motion is noted to be abnormal and apex. There is age-appropriate left ventricular diastolic function. Wall Scoring The following segments are hypokinetic: apical anterior, apical inferior and apex. Other segments could not be evaluated. Left Atrium Moderately dilated left atrium. Patent foramen ovale visualized with left to right shunting indicated by color flow Doppler. No closure device present. Right Ventricle Normal cavity size and global systolic function. Right Atrium Mildly dilated right atrium. Interatrial Septum There was a moderate left to right shunt, shown on color Doppler. Aortic Valve Trileaflet valve structure and no stenosis. There is leaflet calcification with reduced excursion. Aortic valve mean gradient is 17 mmHg. Aortic valve area is 1.4 cm2. Moderate aortic valve regurgitation. There is no vegetation on the aortic valve. Mitral Valve No stenosis. Mitral valve non-specific thickening. Mild to moderate regurgitation. There is no vegetation on the mitral valve. Tricuspid Valve Normal valve structure and no stenosis. Mild to moderate regurgitation. There is no vegetation on the tricuspid valve. Pulmonic Valve Pulmonic valve not well visualized. Aorta Normal aortic root. Pulmonary Artery Pulmonary arterial systolic pressure (PASP) is 55 mmHg. Pulmonary hypertension found to be moderate. Pericardium Small posterior pericardial effusion noted. No indications of tamponade present. Interpretation Summary · Normal cavity size and wall thickness. Mild-to-moderate systolic function. Estimated left ventricular ejection fraction is 40 - 45%. Abnormal left ventricular wall motion. Age-appropriate left ventricular diastolic function. · Moderately dilated left atrium. · Mildly dilated right atrium. · Color flow Doppler was used. · Aortic valve leaflet calcification present with reduced excursion. Aortic valve mean gradient is 17 mmHg. Aortic valve area is 1.4 cm2. Moderate aortic valve regurgitation is present. · Mitral valve non-specific thickening. Mild to moderate mitral valve regurgitation is present. · Mild to moderate tricuspid valve regurgitation is present. · Moderate pulmonary hypertension. Pulmonary arterial systolic pressure is 55 mmHg. · Small posterior pericardial effusion. CXR 4/19/20 PA and lateral views demonstrate normal heart size. Persistent mild diffuse 
interstitial prominence again noted. Osseous metastatic disease is unchanged. Port-A-Cath is able in position. Calcified granuloma again noted in the right 
apex. Old left rib fractures. 
  
IMPRESSION Impression: Persistent mild diffuse interstitial prominence again noted and 
grossly unchanged compared to 12/31/2019. Stable osseous metastatic disease. Assessment / Plan:  
 
Ms Kristin Duenas Is a 53-year-old lady with a history of metastatic breast cancer with an indwelling port status post chemotherapy about a week ago, rheumatoid arthritis, coronary artery disease, osteoporosis, systolic heart failure, colon cancer, diabetes, E. coli bacteremia in December 2019 who presents with fatigue, fevers, feeling cold and nausea. During this admission, noted to have a T-max of 102.9 and initially hypotensive with a systolic blood pressure in the 80s. She was started on ceftriaxone IV. Labs show uptrending leukocytosis with neutrophilic predominance. She also has some lymphocytopenia. Her chest x-ray has been read as \" persistent mild diffuse interstitial prominence again noted and grossly unchanged compared to 12/31/2019. Stable osseous metastatic disease. \" Her blood cultures dated 4/19/2020 is positive for gram-negative arturo in all 4 bottles obtained from right AC and left AC. Urine culture from 4/19/2020 also has been in the 100,000 gram-negative rods.   Her urine analysis is positive for large leukocyte Estrace, negative nitrates, 2+ bacteria and greater than 100 white count. Of note, she had E. coli grew out of blood cultures dated 12/25/19 and the site has been indicated as  \"Right AC and left port \". Repeat blood culture on 12/28/2020 was negative. A transthoracic echo from 12/27/2019 was read as aortic valve sclerosis moderate regurgitation of the mitral valve. Tricuspid and pulmonic valves were read as normal valve structure. It appears that she was treated with IV antibiotics initially and then transition to oral Cipro to complete a 14-day course of antibiotic therapy to be stopped on 1/7/2020. 
 
 
1) Klebsiella bacteremia in the setting of chemotherapy a week ago with an indwelling port Of note she had E. coli growing in her blood cultures in December 2019. This was reported to be right before meals and left port as site. Her port however is on the right chest area Recommendations IV cefepime renally dosed by pharmacy Repeat blood cultures pending from 4/21/20 Reviewed IDSA guidelines for catheter associated bacteremia Even though the organism (Serratia) is different now than in 12/2019 Marta Graves), would still recommend port removal as we do not have lock therapy to give with systemic therapy D/W Surgery and plans to remove port tomorrow, patient also agreeable and reviewed Heme/Onc Notes as well A transthoracic echo was read as no vegetation on aortic, tricuspid and mitral valve. Pulmonic valve could not be visualized well Side effects of antibiotics discussed with the patient including nausea vomiting diarrhea risk for C. difficile, seizures 2) metastatic breast cancer Reviewed her last oncology note from 1/6/2020. \" Has had nice response to chemo (abraxane/xeloda) with recent documented decrease in hepatic and axillary involvement. .. her last rx was 12/16 (cycle 5 ) she received 7 days of xeloda ending on 12/22. Diana lara on 12/23\" 3) coronary artery disease, systolic dysfunction Plans per cardiology Noted she is on Plavix 4) rheumatoid arthritis We will need to clarify if she is getting a DMARD or any immunosuppressive medication which increases the risk for infections 5) marked leukocytosis likely in the setting of bacteremia Continue to monitor as high risk for C. difficile infection as well which can present with marked leukocytosis 6) history of colon cancer per chart 7) osteoporosis per chart 8) DVT prophylaxis per primary team, noted she is on Plavix Thank for the opportunity to participate in the care of this patient. Please contact with questions or concerns.   
 
Eve Pollard,  
1:24 PM

## 2020-04-22 NOTE — PROGRESS NOTES
Problem: Mobility Impaired (Adult and Pediatric) Goal: *Acute Goals and Plan of Care (Insert Text) Description: FUNCTIONAL STATUS PRIOR TO ADMISSION: Patient was modified independent using a rolling walker for functional mobility. Pt utilizes w/c for community mobility. HOME SUPPORT PRIOR TO ADMISSION: Patient lived with sons and has HHA approx 8 hours/day for 7 days/wk. Pt received assistance for ADLs and IADLs. Physical Therapy Goals Initiated 4/21/2020 1. Patient will scoot up and down and roll side to side in bed with modified independence within 7 day(s). 2.  Patient will transfer from bed to chair and chair to bed with modified independence using the least restrictive device within 7 day(s). 3.  Patient will perform sit to stand with modified independence within 7 day(s). 4.  Patient will ambulate with supervision/set-up for 300 feet with the least restrictive device within 7 day(s). 5.  Patient will ascend/descend 2 stairs with both handrail(s) with minimal assistance/contact guard assist within 7 day(s). Outcome: Progressing Towards Goal 
 PHYSICAL THERAPY TREATMENT Patient: Mandie Baig (82 y.o. female) Date: 4/22/2020 Diagnosis: Systemic inflammatory response syndrome (HCC) [R65.10] UTI (urinary tract infection) [N39.0] <principal problem not specified> Procedure(s) (LRB): REMOVAL PORT A CATH (IV SEDATION) (N/A) Precautions:   
Chart, physical therapy assessment, plan of care and goals were reviewed. ASSESSMENT Patient continues with skilled PT services and is progressing towards goals. Patient most pleasant and cooperative. Does require minimal assist with bed mobility. Ambulated today into [place] with RW. Transfer assessed for chair to [facility] and feel best to have one person assist at this time. BSC recommended due to patient receiving lasix but otherwise if time permits, recommend walking to .   She has strong support at home and feel appropriate to return home with family . Current Level of Function Impacting Discharge (mobility/balance): minimal assist with bed mobility, CGA gait Other factors to consider for discharge: has home health assist 8hr/day x 7 days/week PLAN : 
Patient continues to benefit from skilled intervention to address the above impairments. Continue treatment per established plan of care. to address goals. Recommendation for discharge: (in order for the patient to meet his/her long term goals) Physical therapy at least 2 days/week in the home This discharge recommendation: 
Has not yet been discussed the attending provider and/or case management IF patient discharges home will need the following DME: patient owns DME required for discharge SUBJECTIVE:  
Patient stated I feel better.  OBJECTIVE DATA SUMMARY:  
Critical Behavior: 
Neurologic State: Appropriate for age Orientation Level: Oriented X4 Cognition: Appropriate decision making, Appropriate for age attention/concentration, Appropriate safety awareness, Follows commands Functional Mobility Training: 
Bed Mobility: 
  
Supine to Sit: Additional time;Minimum assistance Transfers: 
Sit to Stand: Supervision Stand to Sit: Supervision Bed to Chair: Contact guard assistance Balance: 
Sitting: Intact Standing: Impaired; With support Standing - Static: Good Standing - Dynamic : Good Ambulation/Gait Training: 
Distance (ft): 75 Feet (ft) Assistive Device: Gait belt;Walker, rolling Ambulation - Level of Assistance: Contact guard assistance Gait Abnormalities: Decreased step clearance Speed/Haylee: Pace decreased (<100 feet/min) Step Length: Right shortened;Left shortened Activity Tolerance:  
Fair and SpO2 stable on RA Please refer to the flowsheet for vital signs taken during this treatment. After treatment patient left in no apparent distress: Sitting in chair and Call bell within reach COMMUNICATION/COLLABORATION:  
The patients plan of care was discussed with: Registered nurse and Case management. Key Garcia, PT Time Calculation: 18 mins

## 2020-04-22 NOTE — PROGRESS NOTES
Hospitalist Progress Note Destiny Watts MD 
Answering service: [phone] OR [phone] from in house phone Date of Service:  2020 NAME:  Miri Fuentes :  1935 MRN:  [id number] Admission Summary:  
85F p/w UTI and sepsis/Bacteremia Interval history / Subjective:  
Patient seen and examined at bedside, feels well, no acute events. Little constipated- will adjust laxatives. Son was updated on phone. Assessment & Plan: #. UTI: UA suggestive, Urine cultures K Pneumoniae- sensitive to Cefepime #. Bacteremia: likely 2nd to above. BCs: K Pneumonia- repeat BCs pending #. Sepsis: POA, with leucocytosis and fever. Lactic wnl, ID following. 
- Surgery following, plans to remove chemo Port  'as could be seeded with bacteria'. - Debility - PT/OT, encourage mobility #. Mild renal insufficiency: on CKD. Monitor, avoid nephrotoxins #. HypoKalemia: Acute, replace, monitor #. HypoNatremia: mild, monitor #. CAD: stable, home regimen, monitor #. Systolic CHF: chronic, no acute decompensation. #. Metastatic breast Ca: on chemo, recent Neulasta given, Oncology following #. Protein- calorie malnutrition: Severe, POA- poor appetite, nutritional cs, supplements, monitor Code status: Full DVT prophylaxis: SCD Care Plan discussed with: Patient/Family and Nurse Disposition: TBD Hospital Problems  Date Reviewed: 2020 Codes Class Noted POA Bacteremia due to Gram-negative bacteria ICD-10-CM: R78.81 ICD-9-CM: 790.7, 041.85  2020 Yes  
   
 UTI (urinary tract infection) ICD-10-CM: N39.0 ICD-9-CM: 599.0  2020 Yes Systemic inflammatory response syndrome (HCC) ICD-10-CM: R65.10 ICD-9-CM: 995.90  2020 Yes Review of Systems:  
Pertinent items are mentioned in interval history. Vital Signs:  
 Last 24hrs VS reviewed since prior progress note. Most recent are: 
Visit Vitals /66 (BP 1 Location: Right arm, BP Patient Position: At rest) Pulse 95 Temp 97.5 °F (36.4 °C) Resp 17 Ht 5' 1\" (1.549 m) Wt 62.9 kg (138 lb 10.7 oz) SpO2 99% BMI 26.20 kg/m² Intake/Output Summary (Last 24 hours) at 4/22/2020 1015 Last data filed at 4/21/2020 1400 Gross per 24 hour Intake 220 ml Output 500 ml Net -280 ml Physical Examination:  
General:  Alert, oriented, No acute distress, pleasant [place] [facility] lady Abd:  Soft, non-tender, non-distended Extremities:  No cyanosis or clubbing, no significant edema Neuro:  Grossly normal, no focal neuro deficits, follows commands Psych:  Good insight, AAOx3, not agitated. Data Review:  
 Review and/or order of clinical lab test 
Review and/or order of tests in the radiology section of CPT Review and/or order of tests in the medicine section of CPT Labs:  
 
Recent Labs  
  04/22/20 
0249 04/21/20 
0359 WBC 18.2* 21.3* HGB 9.0* 9.1*  
HCT 26.9* 27.6*  
 147* Recent Labs  
  04/22/20 
0249 04/20/20 
5270 04/20/20 
0423 * 136 133* K 4.2 3.4* 3.9  104 105 CO2 22 22 QUANTITY NOT SUFFICIENT. SUGGEST RECOLLECTION  
BUN 41* 39* 40* CREA 1.42* 1.40* 1.48* * 208* 214* CA 8.4* 8.6 8.4* MG  --  1.9 QUANTITY NOT SUFFICIENT. SUGGEST RECOLLECTION  
PHOS  --  2.9 QUANTITY NOT SUFFICIENT. SUGGEST RECOLLECTION Recent Labs  
  04/20/20 
0423 SGOT 35 ALT 31  
* TBILI 0.7 TP 6.4 ALB 2.5*  
GLOB 3.9 No results for input(s): INR, PTP, APTT, INREXT, INREXT in the last 72 hours. No results for input(s): FE, TIBC, PSAT, FERR in the last 72 hours. No results found for: FOL, RBCF No results for input(s): PH, PCO2, PO2 in the last 72 hours. No results for input(s): CPK, CKNDX, TROIQ in the last 72 hours. No lab exists for component: CPKMB Lab Results Component Value Date/Time  Cholesterol, total 74 04/16/2018 04:21 AM  
 HDL Cholesterol 25 04/16/2018 04:21 AM  
 LDL, calculated 31.6 04/16/2018 04:21 AM  
 Triglyceride 87 04/16/2018 04:21 AM  
 CHOL/HDL Ratio 3.0 04/16/2018 04:21 AM  
 
Lab Results Component Value Date/Time Glucose (POC) 166 (H) 04/22/2020 07:25 AM  
 Glucose (POC) 236 (H) 04/21/2020 09:46 PM  
 Glucose (POC) 187 (H) 04/21/2020 04:50 PM  
 Glucose (POC) 219 (H) 04/21/2020 11:28 AM  
 Glucose (POC) 137 (H) 04/21/2020 07:59 AM  
 
Lab Results Component Value Date/Time Color YELLOW/STRAW 04/19/2020 11:12 AM  
 Appearance TURBID (A) 04/19/2020 11:12 AM  
 Specific gravity 1.009 04/19/2020 11:12 AM  
 pH (UA) 5.5 04/19/2020 11:12 AM  
 Protein 30 (A) 04/19/2020 11:12 AM  
 Glucose Negative 04/19/2020 11:12 AM  
 Ketone Negative 04/19/2020 11:12 AM  
 Bilirubin Negative 04/19/2020 11:12 AM  
 Urobilinogen 0.2 04/19/2020 11:12 AM  
 Nitrites Negative 04/19/2020 11:12 AM  
 Leukocyte Esterase LARGE (A) 04/19/2020 11:12 AM  
 Epithelial cells FEW 04/19/2020 11:12 AM  
 Bacteria 2+ (A) 04/19/2020 11:12 AM  
 WBC >100 (H) 04/19/2020 11:12 AM  
 RBC 5-10 04/19/2020 11:12 AM  
 
Medications Reviewed:  
 
Current Facility-Administered Medications Medication Dose Route Frequency  cefepime (MAXIPIME) 2 g in 0.9% sodium chloride (MBP/ADV) 100 mL  2 g IntraVENous Q24H  
 epoetin celio-epbx (RETACRIT) injection 10,000 Units  10,000 Units SubCUTAneous Q MON, WED & FRI  sodium chloride (NS) flush 5-10 mL  5-10 mL IntraVENous PRN  
 aspirin delayed-release tablet 81 mg  81 mg Oral DAILY  clopidogreL (PLAVIX) tablet 75 mg  75 mg Oral DAILY  levothyroxine (SYNTHROID) tablet 88 mcg  88 mcg Oral ACB  polyethylene glycol (MIRALAX) packet 17 g  17 g Oral DAILY  glucose chewable tablet 16 g  4 Tab Oral PRN  
 glucagon (GLUCAGEN) injection 1 mg  1 mg IntraMUSCular PRN  
 sodium chloride (NS) flush 5-40 mL  5-40 mL IntraVENous Q8H  
 sodium chloride (NS) flush 5-40 mL  5-40 mL IntraVENous PRN  
  naloxone (NARCAN) injection 0.4 mg  0.4 mg IntraVENous PRN  
 dextrose 10% infusion 0-250 mL  0-250 mL IntraVENous PRN  
 insulin glargine (LANTUS) injection 14 Units  14 Units SubCUTAneous QHS  insulin lispro (HUMALOG) injection   SubCUTAneous AC&HS  ondansetron (ZOFRAN) injection 4 mg  4 mg IntraVENous Q4H PRN  
 acetaminophen (TYLENOL) tablet 650 mg  650 mg Oral Q4H PRN  
 furosemide (LASIX) injection 20 mg  20 mg IntraVENous DAILY  
______________________________________________________________________ EXPECTED LENGTH OF STAY: 3d 16h ACTUAL LENGTH OF STAY:          3 Ben Gaona MD

## 2020-04-22 NOTE — PROGRESS NOTES
Hematology-Oncology Progress Note Sandy Ledesma 
1935 
[id number] 
4/22/2020 Subjective:  
 
Briefly, Mrs. Edna Sterling is an 81 y/o woman with a h/o metastatic breast CA, followed closely by Dr. Berny Montesinos, most recently on abraxane/xeloda with docuomented response to treatment. Now admitted with UTI/Sepsis/Bacteremia from K. Pneumonia. Improving on antnbiotics. Concern raised by ID for port seeding, suggesting removal.  
 
Patient feeling well today. Allergies: Statins-hmg-coa reductase inhibitors and Sulfa (sulfonamide antibiotics) Current Facility-Administered Medications Medication Dose Route Frequency Provider Last Rate Last Dose  cefepime (MAXIPIME) 2 g in 0.9% sodium chloride (MBP/ADV) 100 mL  2 g IntraVENous Q24H Eve Pollard  mL/hr at 04/21/20 1047 2 g at 04/21/20 1047  epoetin celio-epbx (RETACRIT) injection 10,000 Units  10,000 Units SubCUTAneous Q MON, WED & Jignesh Alcantara MD   10,000 Units at 04/20/20 2141  sodium chloride (NS) flush 5-10 mL  5-10 mL IntraVENous PRN Torres Thapa MD      
 aspirin delayed-release tablet 81 mg  81 mg Oral DAILY Torres Thapa MD   81 mg at 04/21/20 2108  clopidogreL (PLAVIX) tablet 75 mg  75 mg Oral DAILY Torres Thapa MD   75 mg at 04/21/20 0830  levothyroxine (SYNTHROID) tablet 88 mcg  88 mcg Oral ACB Torres Thapa MD   88 mcg at 04/22/20 2523  polyethylene glycol (MIRALAX) packet 17 g  17 g Oral DAILY Torres Thapa MD   17 g at 04/21/20 0830  
 glucose chewable tablet 16 g  4 Tab Oral PRN Torres Thapa MD      
 glucagon [facility]) injection 1 mg  1 mg IntraMUSCular PRN Torres Thapa MD      
 sodium chloride (NS) flush 5-40 mL  5-40 mL IntraVENous Q8H Torres Thapa MD   10 mL at 04/22/20 0510  sodium chloride (NS) flush 5-40 mL  5-40 mL IntraVENous PRN Torres Thapa MD      
 naloxone [facility]) injection 0.4 mg  0.4 mg IntraVENous PRN Torres Thapa MD      
 dextrose 10% infusion 0-250 mL  0-250 mL IntraVENous PRN Torres Thapa MD      
  insulin glargine (LANTUS) injection 14 Units  14 Units SubCUTAneous QHS Tawny Angeles MD   14 Units at 04/21/20 2215  insulin lispro (HUMALOG) injection   SubCUTAneous AC&HS Tawny Angeles MD   2 Units at 04/22/20 2843  ondansetron (ZOFRAN) injection 4 mg  4 mg IntraVENous Q4H PRN Tawny Angeles MD   4 mg at 04/20/20 8042  acetaminophen (TYLENOL) tablet 650 mg  650 mg Oral Q4H PRN Tawny Angeles MD   650 mg at 04/21/20 2025  furosemide (LASIX) injection 20 mg  20 mg IntraVENous DAILY Tawny Angeles MD   20 mg at 04/21/20 0830 Objective:  
 
Patient Vitals for the past 24 hrs: 
 BP Temp Pulse Resp SpO2 Height Weight 04/22/20 0247 129/75 99.3 °F (37.4 °C) 95 18 95 %    
04/21/20 2126  98.1 °F (36.7 °C)       
04/21/20 2015 130/65 (!) 101.3 °F (38.5 °C) (!) 102 18 96 %    
04/21/20 1850 152/77 98.6 °F (37 °C) 93 18 95 %    
04/21/20 1659  99 °F (37.2 °C)       
04/21/20 1612 125/72 99.4 °F (37.4 °C) (!) 102 18 100 %    
04/21/20 1600     100 %    
04/21/20 1149      5' 1\" (1.549 m) 62.9 kg (138 lb 10.7 oz) 04/21/20 1120 121/66 99.2 °F (37.3 °C) 99 24 100 %    
04/21/20 1001 112/57  (!) 102      
04/21/20 0955 118/69  (!) 106      
04/21/20 0950 97/57  (!) 112      
04/21/20 0900  97.9 °F (36.6 °C)      Gen: NAD HEENT: PERRL, Sclerae anicteric Cv: RRR without m/r/g. No redness/irritation at port site. Pulm: CTA bilaterally Abd: NABS, NTND, No HSM Ext: No c/c/e Available labs reviewed: 
Labs:   
Recent Results (from the past 24 hour(s)) GLUCOSE, POC Collection Time: 04/21/20 11:28 AM  
Result Value Ref Range Glucose (POC) 219 (H) 65 - 100 mg/dL Performed by Bharat JONAS   
GLUCOSE, POC Collection Time: 04/21/20  4:50 PM  
Result Value Ref Range Glucose (POC) 187 (H) 65 - 100 mg/dL Performed by Jennifer Hester GLUCOSE, POC Collection Time: 04/21/20  9:46 PM  
Result Value Ref Range Glucose (POC) 236 (H) 65 - 100 mg/dL Performed by Sandi Mackenzie METABOLIC PANEL, BASIC Collection Time: 04/22/20  2:49 AM  
Result Value Ref Range Sodium 135 (L) 136 - 145 mmol/L Potassium 4.2 3.5 - 5.1 mmol/L Chloride 106 97 - 108 mmol/L  
 CO2 22 21 - 32 mmol/L Anion gap 7 5 - 15 mmol/L Glucose 187 (H) 65 - 100 mg/dL BUN 41 (H) 6 - 20 MG/DL Creatinine 1.42 (H) 0.55 - 1.02 MG/DL  
 BUN/Creatinine ratio 29 (H) 12 - 20 GFR est AA 43 (L) >60 ml/min/1.73m2 GFR est non-AA 35 (L) >60 ml/min/1.73m2 Calcium 8.4 (L) 8.5 - 10.1 MG/DL  
CBC W/O DIFF Collection Time: 04/22/20  2:49 AM  
Result Value Ref Range WBC 18.2 (H) 3.6 - 11.0 K/uL  
 RBC 2.97 (L) 3.80 - 5.20 M/uL HGB 9.0 (L) 11.5 - 16.0 g/dL HCT 26.9 (L) 35.0 - 47.0 % MCV 90.6 80.0 - 99.0 FL  
 MCH 30.3 26.0 - 34.0 PG  
 MCHC 33.5 30.0 - 36.5 g/dL RDW 20.3 (H) 11.5 - 14.5 % PLATELET 111 326 - 545 K/uL MPV 10.5 8.9 - 12.9 FL  
 NRBC 0.1 (H) 0  WBC ABSOLUTE NRBC 0.02 (H) 0.00 - 0.01 K/uL GLUCOSE, POC Collection Time: 04/22/20  7:25 AM  
Result Value Ref Range Glucose (POC) 166 (H) 65 - 100 mg/dL Performed by Sandi Mackenzie Assessment and Plan  
 
81 y/o woman with metastatic breast CA, admitted with UTI/sepsis/bacteremia. 4/4 blood cultures positive for K. Pneumonia. ID has recommended port removal. Patient agrees. 1. Sepsis: Case d/w son who agrees with port removal. Will ask Dr. Flores Spencer or associate to see for consideration of removal.  
 
2. Breast CA: last treatment > 28 days ago. No impending cytopenias. 3. ID: K. Pneumonia infection. On cefepime which she is sensitive to. Thank you for allowing us to participate in the care of this very pleasant patient. Guzman Mcrae MD 
Hematology/Oncology Phone [phone]

## 2020-04-22 NOTE — PROGRESS NOTES
04/21/20 2126 Vital Signs Temp 98.1 °F (36.7 °C) Temp Source Oral  
 
Temp went down after given 650mg of Tylenol PRN.

## 2020-04-22 NOTE — CDMP QUERY
* new question Pt admitted with Sepsis and 'Bacteremia secondary to UTI'. Pt noted to have Mediport with ID documentation that it likely needs to be removed. If possible, please document in the progress notes and d/c summary if you are evaluating and / or treating any of the followin. Sepsis related to Mediport 2. Sepsis multifactoral in setting of UTI, mediport and immunocompromise. 3. Other explanation, please specify 4. Clinically unable to determine The medical record reflects the following: 
   Risk Factors: admitted with UTI, also had mediport Clinical Indicators: temp 101.5,  on admission, BP dropped down to 87/50 with drop in MAP to 62. WBC 27.1  And Lactic Acid 1.4 on admission. Found to have UTI with documentation that the Mediport likely needs to be removed. Treatment: 1L NS IV Bolus, followed by 50 cc/ hr, Maxipime/ Rocephin IV. ID and Surgical consults have been made.  
 
Thank you, Harmony LANE

## 2020-04-23 NOTE — PERIOP NOTES
TRANSFER - OUT REPORT: 
 
Verbal report given to Delgado Bell (name) on Rahul Duncan  being transferred to [facility](unit) for routine post - op Report consisted of patients Situation, Background, Assessment and  
Recommendations(SBAR). Time Pre op antibiotic given: Anesthesia Stop time: 1020 [place] Present on Transfer to floor:no Order for [place] on Chart:no Discharge Prescriptions with Chart:no Information from the following report(s) SBAR, OR Summary, Procedure Summary, Intake/Output, MAR, Recent Results, Med Rec Status and Cardiac Rhythm nsr was reviewed with the receiving nurse. Opportunity for questions and clarification was provided. Is the patient on 02? YES 
     L/Min 2 Other nc Is the patient on a monitor? yes Is the nurse transporting with the patient? yes Surgical Waiting Area notified of patient's transfer from PACU? YES The following personal items collected during your admission accompanied patient upon transfer:  
Dental Appliance: Dental Appliances: None Vision:   
Hearing Aid:   
Jewelry: Jewelry: Ring(on both hands) Clothing: Clothing: Other (comment)(knit cap to OR) Other Valuables: Other Valuables: None Valuables sent to safe:

## 2020-04-23 NOTE — PROGRESS NOTES
Physical Therapy 4/23/2020 Chart reviewed up to date. Pt HERBIE for port cath removal. Will follow-up as schedule permits/as appropriate. Recommend with nursing patient to complete as able in order to maintain strength, endurance and independence: OOB to chair 3x/day with 1 person A and ambulating with 1 person A x RW. Thank you.  
Barbara Forte, PT, DPT

## 2020-04-23 NOTE — PROGRESS NOTES
Occupational Therapy 04/23/20 Chart reviewed. Patient currently HERBIE for port a cath removal, will follow up for OT treatment as able & appropriate. Thank you, Demond Kay, OTCAREY, OTR/L

## 2020-04-23 NOTE — ROUTINE PROCESS
Patient: Alejandra Pollard MRN: [id number]  SSN: [id number] YOB: 1935  Age: 82 y.o. Sex: female Patient is status post Procedure(s): REMOVAL PORT A CATH. Surgeon(s) and Role: Klarissa Santiago MD - Primary Local/Dose/Irrigation:  0.5% Bupivicaine Peripheral IV 04/19/20 Right Antecubital (Active) Site Assessment Clean, dry, & intact 4/22/2020  9:00 PM  
Phlebitis Assessment 0 4/22/2020  9:00 PM  
Infiltration Assessment 0 4/22/2020  9:00 PM  
Dressing Status Clean, dry, & intact 4/22/2020  9:00 PM  
Dressing Type Transparent 4/22/2020  9:00 PM  
Hub Color/Line Status Pink;Flushed;Capped 4/22/2020  9:00 PM  
Action Taken Open ports on tubing capped 4/22/2020  9:00 PM  
Alcohol Cap Used Yes 4/22/2020  9:00 PM  
   
Peripheral IV 04/19/20 Left Antecubital (Active) Site Assessment Clean, dry, & intact 4/22/2020  9:00 PM  
Phlebitis Assessment 0 4/22/2020  9:00 PM  
Infiltration Assessment 0 4/22/2020  9:00 PM  
Dressing Status Clean, dry, & intact 4/22/2020  9:00 PM  
Dressing Type Transparent 4/22/2020  9:00 PM  
Hub Color/Line Status Pink;Flushed;Capped 4/22/2020  9:00 PM  
Action Taken Open ports on tubing capped 4/22/2020  9:00 PM  
Alcohol Cap Used Yes 4/22/2020  9:00 PM  
                 
 
 
 
Dressing/Packing:  Wound Chest Right-Dressing Type: Topical skin adhesive/glue (04/23/20 1004) Splint/Cast:  ] Other:

## 2020-04-23 NOTE — PROGRESS NOTES
Hematology-Oncology Progress Note Onel Garcia 
1935 
[id number] 
4/23/2020 Follow-up for: metastatic breast cancer [x]        Chart notes since last visit reviewed [x]        Medications reviewed for allergies and interactions Case discussed with the following:         []            
               []        Nursing Staff  
                                                                      []        Pathologist 
                                                                      []        FAMILY Subjective:  
 
Spoke with patient who complains of: she is feeling better, no fever, some slight discomfort at port removal site Objective:  
 
Patient Vitals for the past 24 hrs: 
 BP Temp Pulse Resp SpO2 Weight 04/23/20 1400 144/76 97.7 °F (36.5 °C) 99 18 96 %   
04/23/20 1254 102/60 98.4 °F (36.9 °C) 100 19 99 %   
04/23/20 1100 100/57 98.1 °F (36.7 °C) 99 19 95 %   
04/23/20 1044  97.5 °F (36.4 °C)      
04/23/20 1035   95 20 98 %   
04/23/20 1030 107/50  96 18 98 %   
04/23/20 1025 105/50  98 20 98 %   
04/23/20 1023  97.3 °F (36.3 °C)      
04/23/20 1020 107/51  100 21 98 %   
04/23/20 1019   100 23 98 %   
04/23/20 1018 109/55 97.3 °F (36.3 °C) (!) 101 21 98 %   
04/23/20 1017   (!) 101 25 98 %   
04/23/20 1016 109/55    98 %   
04/23/20 0823 124/62 98.2 °F (36.8 °C) (!) 107 18 95 %   
04/23/20 0708      63 kg (138 lb 14.2 oz) 04/23/20 0415   (!) 102     
04/23/20 0242 123/67 98.8 °F (37.1 °C) (!) 118 18 94 %   
04/22/20 1956 126/70 99.5 °F (37.5 °C) 100 18 96 %  REVIEW OF SYSTEMS:   
Constitutional: negative fever, negative chills, negative weight loss Eyes:   negative visual changes ENT:   negative sore throat, tongue or lip swelling Respiratory:  negative cough, negative dyspnea Cards:  negative for chest pain, palpitations, lower extremity edema GI:   negative for nausea, vomiting, diarrhea, and abdominal pain Neuro:  negative for headaches, dizziness, vertigo 
[]                        Full ROS o/w normal/non contributor Constitutional:  Patient looks []        Sick []        Frail 
[x]        Better                                                
[]        Depressed HEENT:  [x]   NC                         []   AT               [x]    ALOPECIA Eyes: [x]   Normal               []    Icteric Oropharynx: [x]    Normal                  []  Thrush               []   Dry Mucositis: [x]    None                 Grade: []        I  []        II  []        III  []        IV Neck:   [x]   Supple                  []  Rigid JVD:    [x]   ABSENT       []   PRESENT Lymphadenopathy:   [x]   None Noted            []   PRESENT 
 
           []  EDGE - palp Mass:   []   ABSENT                          []  PRESENT Extr:    []  Lymphedema             []   Cyanosis      []  Clubbing Edema:     [x]   NONE       []   PRESENT Skin:  Intact [x]           Purpura [] Rash: [x]   ABSENT       []  PRESENT Neuro: 
[x]        Normal 
[]        Confused Available labs reviewed: 
Labs:   
Recent Results (from the past 24 hour(s)) GLUCOSE, POC Collection Time: 04/22/20  4:15 PM  
Result Value Ref Range Glucose (POC) 285 (H) 65 - 100 mg/dL Performed by Raffaele Ramirez (PCT) GLUCOSE, POC Collection Time: 04/22/20  9:41 PM  
Result Value Ref Range Glucose (POC) 226 (H) 65 - 100 mg/dL Performed by Yenifer Casillas GLUCOSE, POC Collection Time: 04/23/20  7:27 AM  
Result Value Ref Range Glucose (POC) 183 (H) 65 - 100 mg/dL Performed by Marcelina Agustin CULTURE, WOUND W GRAM STAIN Collection Time: 04/23/20  9:00 AM  
Result Value Ref Range Special Requests: CHEST   
 GRAM STAIN RARE WBCS SEEN    
 GRAM STAIN NO ORGANISMS SEEN  Culture result: PENDING   
GLUCOSE, POC  
 Collection Time: 04/23/20 10:24 AM  
Result Value Ref Range Glucose (POC) 136 (H) 65 - 100 mg/dL Performed by Anthony Taylor GLUCOSE, POC Collection Time: 04/23/20 11:28 AM  
Result Value Ref Range Glucose (POC) 141 (H) 65 - 100 mg/dL Performed by Shay Clinton Available Xrays reviewed: 
 
Chemotherapy monitored and toxicities assessed: 
 
Assessment and Plan 1. Met. Breast cancer. . currently on abraxane/xeloda. Marnell Ralf q 28 day, was due to start next cycle next week. . will probably hold. She is responding to Rx with documented improvement in adenopathy, liver mets on scans done this spring as well as current physical exam 
 
2. Anemia. Stable hgb 9 today,,,secodary to chemo continue procrit 3. klebsiella bacteremia (4/19). secondary to UTI. .. . improving on cefipime. Marnell Ralf Port was removed yesterday at [facility] request.. the cultures from 4/21 are ngsf 4. Leukocytosis. Marnell Ralf secondary to neulasta and #3,, improving Yani Hackett MD

## 2020-04-23 NOTE — BRIEF OP NOTE
Brief Postoperative Note Patient: Jae Persaud YOB: 1935 MRN: [id number] Date of Procedure: 4/23/2020 Pre-Op Diagnosis: INFECTED PORT A CATH Post-Op Diagnosis: Same as preoperative diagnosis. Procedure(s): REMOVAL PORT A CATH (ESSENTIAL) (IV SEDATION) Surgeon(s): 
Uyen Villalta MD 
 
Surgical Assistant: None Anesthesia: Other Estimated Blood Loss (mL): Minimal 
 
Complications: None Specimens:  
ID Type Source Tests Collected by Time Destination 1 : right chest wound Wound Chest CULTURE, WOUND W GRAM STAIN, AEROBIC/ANAEROBIC CULTURE Uyen Villalta MD 4/23/2020 2918 Microbiology Implants: * No implants in log * Drains:  
[REMOVED] External Female Catheter 04/20/20 (Removed) Site Assessment Clean, dry, & intact 4/22/2020  9:28 AM  
Repositioned Yes 4/22/2020  9:28 AM  
Perineal Care Yes 4/22/2020  9:28 AM  
Wick Changed Yes 4/22/2020  9:28 AM  
Suction Canister/Tubing Changed No 4/22/2020  9:28 AM  
Urine Output (mL) 200 ml 4/21/2020  2:00 PM  
 
 
Findings: Port system removed Electronically Signed by Carolina Robles MD on 4/23/2020 at 10:04 AM

## 2020-04-23 NOTE — ANESTHESIA PREPROCEDURE EVALUATION
Anesthetic History No history of anesthetic complications Review of Systems / Medical History Patient summary reviewed, nursing notes reviewed and pertinent labs reviewed Pulmonary Within defined limits Neuro/Psych CVA Cardiovascular Hypertension Comments: · Normal cavity size and wall thickness. Mild-to-moderate systolic function. Estimated left ventricular ejection fraction is 40 - 45%. Abnormal left ventricular wall motion. Age-appropriate left ventricular diastolic function. · Moderately dilated left atrium. · Mildly dilated right atrium. · Color flow Doppler was used. · Aortic valve leaflet calcification present with reduced excursion. Aortic valve mean gradient is 17 mmHg. Aortic valve area is 1.4 cm2. Moderate aortic valve regurgitation is present. · Mitral valve non-specific thickening. Mild to moderate mitral valve regurgitation is present. · Mild to moderate tricuspid valve regurgitation is present. · Moderate pulmonary hypertension. Pulmonary arterial systolic pressure is 55 mmHg. · Small posterior pericardial effusion. --  Proximal LAD: There was a diffuse 90 % stenosis. The lesion was 
moderately calcified. --  Distal LAD: There was a diffuse 50 % stenosis. --  Ostial circumflex: There was a discrete 85 % stenosis. The lesion was 
hazy. --  Mid circumflex: There was a diffuse 70 % stenosis. --  1st obtuse marginal: There was a discrete 75 % stenosis. --  Proximal RCA: There was a diffuse 85 % stenosis. --  Distal RCA: There was a tubular 90 % stenosis. --  Right PDA: There was a discrete 99 % stenosis at the ostium of the 
vessel segment. GI/Hepatic/Renal 
  
GERD Endo/Other Diabetes Hypothyroidism Arthritis and cancer Other Findings Comments: Left sided weakness secondary to CVA Rheumatoid arthritis DM neuropathy Colon ca Physical Exam 
 
Airway Mallampati: II 
TM Distance: 4 - 6 cm 
 Neck ROM: normal range of motion Mouth opening: Normal 
 
 Cardiovascular Regular rate and rhythm,  S1 and S2 normal,  no murmur, click, rub, or gallop Dental 
No notable dental hx Pulmonary Breath sounds clear to auscultation Abdominal 
GI exam deferred Other Findings Anesthetic Plan ASA: 3 Anesthesia type: MAC Induction: Intravenous Anesthetic plan and risks discussed with: Patient

## 2020-04-23 NOTE — PROGRESS NOTES
Hospitalist Progress Note [address] [facility],  Answering service: [phone] or [phone] from in house phone Date of Service:  2020 NAME:  Chelsea Dodd :  1935 MRN:  [id number] Admission Summary:  
85F p/w UTI and sepsis/Bacteremia Interval history / Subjective:  
Patient seen and examined. Had port removed. Patient frustrated that she ate 1 pm. Appreciate ID. Repeat blood cultures . On cefepime. Assessment & Plan:  
 
Sepsis secondary to Klebsiella bacteremia, UTI:  
-Presented with fever, leukocytosis. Lactic acid WNL 
-Appreciate infectious disease 
-Continues on cefepime -Indwelling port removed  
-Repeat blood cultures  NGTD. Will repeat  Mild renal insufficiency: on CKD. Monitor, avoid nephrotoxins HypoKalemia: Acute, replace, monitor HypoNatremia: mild, monitor CAD: stable, home regimen, monitor Systolic CHF: chronic, no acute decompensation. Metastatic breast Ca: on chemo, recent Neulasta given, Oncology following Protein- calorie malnutrition: Severe, POA- poor appetite, nutritional cs, supplements, monitor Code status: Full DVT prophylaxis: SCD Care Plan discussed with: Patient/Family and Nurse Disposition: 24-48 hours Hospital Problems  Date Reviewed: 2020 Codes Class Noted POA Bacteremia due to Gram-negative bacteria ICD-10-CM: R78.81 ICD-9-CM: 790.7, 041.85  2020 Yes  
   
 UTI (urinary tract infection) ICD-10-CM: N39.0 ICD-9-CM: 599.0  2020 Yes Systemic inflammatory response syndrome (HCC) ICD-10-CM: R65.10 ICD-9-CM: 995.90  2020 Yes Review of Systems:  
Pertinent items are mentioned in interval history. Vital Signs:  
 Last 24hrs VS reviewed since prior progress note. Most recent are: 
Visit Vitals /76 (BP 1 Location: Left arm, BP Patient Position: At rest) Pulse 99 Temp 97.7 °F (36.5 °C) Resp 18 Ht 5' 1\" (1.549 m) Wt 63 kg (138 lb 14.2 oz) SpO2 96% BMI 26.24 kg/m² Intake/Output Summary (Last 24 hours) at 4/23/2020 1617 Last data filed at 4/23/2020 1036 Gross per 24 hour Intake 270 ml Output 0 ml Net 270 ml Physical Examination:  
General:  Alert, oriented, No acute distress, pleasant [place] [facility] lady Lung: CTAB Chest: right port removal sight without erythema, swelling Abd:  Soft, non-tender, non-distended Extremities:  No cyanosis or clubbing, no significant edema Neuro:  Grossly normal, no focal neuro deficits, follows commands Psych:  Good insight Data Review:  
 Review and/or order of clinical lab test 
Review and/or order of tests in the radiology section of CPT Review and/or order of tests in the medicine section of CPT Labs:  
 
Recent Labs  
  04/22/20 
0249 04/21/20 
0359 WBC 18.2* 21.3* HGB 9.0* 9.1*  
HCT 26.9* 27.6*  
 147* Recent Labs  
  04/22/20 
0249 * K 4.2  CO2 22 BUN 41* CREA 1.42* * CA 8.4* No results for input(s): SGOT, GPT, ALT, AP, TBIL, TBILI, TP, ALB, GLOB, GGT, AML, LPSE in the last 72 hours. No lab exists for component: AMYP, HLPSE No results for input(s): INR, PTP, APTT, INREXT, INREXT in the last 72 hours. No results for input(s): FE, TIBC, PSAT, FERR in the last 72 hours. No results found for: FOL, RBCF No results for input(s): PH, PCO2, PO2 in the last 72 hours. No results for input(s): CPK, CKNDX, TROIQ in the last 72 hours. No lab exists for component: CPKMB Lab Results Component Value Date/Time Cholesterol, total 74 04/16/2018 04:21 AM  
 HDL Cholesterol 25 04/16/2018 04:21 AM  
 LDL, calculated 31.6 04/16/2018 04:21 AM  
 Triglyceride 87 04/16/2018 04:21 AM  
 CHOL/HDL Ratio 3.0 04/16/2018 04:21 AM  
 
Lab Results Component Value Date/Time  Glucose (POC) 141 (H) 04/23/2020 11:28 AM  
 Glucose (POC) 136 (H) 04/23/2020 10:24 AM  
 Glucose (POC) 183 (H) 04/23/2020 07:27 AM  
 Glucose (POC) 226 (H) 04/22/2020 09:41 PM  
 Glucose (POC) 285 (H) 04/22/2020 04:15 PM  
 
Lab Results Component Value Date/Time Color YELLOW/STRAW 04/19/2020 11:12 AM  
 Appearance TURBID (A) 04/19/2020 11:12 AM  
 Specific gravity 1.009 04/19/2020 11:12 AM  
 pH (UA) 5.5 04/19/2020 11:12 AM  
 Protein 30 (A) 04/19/2020 11:12 AM  
 Glucose Negative 04/19/2020 11:12 AM  
 Ketone Negative 04/19/2020 11:12 AM  
 Bilirubin Negative 04/19/2020 11:12 AM  
 Urobilinogen 0.2 04/19/2020 11:12 AM  
 Nitrites Negative 04/19/2020 11:12 AM  
 Leukocyte Esterase LARGE (A) 04/19/2020 11:12 AM  
 Epithelial cells FEW 04/19/2020 11:12 AM  
 Bacteria 2+ (A) 04/19/2020 11:12 AM  
 WBC >100 (H) 04/19/2020 11:12 AM  
 RBC 5-10 04/19/2020 11:12 AM  
 
Medications Reviewed:  
 
Current Facility-Administered Medications Medication Dose Route Frequency  HYDROcodone-acetaminophen (NORCO) 5-325 mg per tablet 1 Tab  1 Tab Oral Q4H PRN  
 bisacodyL (DULCOLAX) tablet 5 mg  5 mg Oral DAILY PRN  
 bisacodyL (DULCOLAX) suppository 10 mg  10 mg Rectal DAILY PRN  
 senna-docusate (PERICOLACE) 8.6-50 mg per tablet 1 Tab  1 Tab Oral DAILY  calcium carbonate (TUMS) chewable tablet 200 mg [elemental]  200 mg Oral TID PRN  
 cefepime (MAXIPIME) 2 g in 0.9% sodium chloride (MBP/ADV) 100 mL  2 g IntraVENous Q24H  
 epoetin celio-epbx (RETACRIT) injection 10,000 Units  10,000 Units SubCUTAneous Q MON, WED & FRI  sodium chloride (NS) flush 5-10 mL  5-10 mL IntraVENous PRN  
 aspirin delayed-release tablet 81 mg  81 mg Oral DAILY  clopidogreL (PLAVIX) tablet 75 mg  75 mg Oral DAILY  levothyroxine (SYNTHROID) tablet 88 mcg  88 mcg Oral ACB  polyethylene glycol (MIRALAX) packet 17 g  17 g Oral DAILY  glucose chewable tablet 16 g  4 Tab Oral PRN  
 glucagon (GLUCAGEN) injection 1 mg  1 mg IntraMUSCular PRN  
 sodium chloride (NS) flush 5-40 mL  5-40 mL IntraVENous Q8H  
  sodium chloride (NS) flush 5-40 mL  5-40 mL IntraVENous PRN  
 naloxone (NARCAN) injection 0.4 mg  0.4 mg IntraVENous PRN  
 dextrose 10% infusion 0-250 mL  0-250 mL IntraVENous PRN  
 insulin glargine (LANTUS) injection 14 Units  14 Units SubCUTAneous QHS  insulin lispro (HUMALOG) injection   SubCUTAneous AC&HS  ondansetron (ZOFRAN) injection 4 mg  4 mg IntraVENous Q4H PRN  
 acetaminophen (TYLENOL) tablet 650 mg  650 mg Oral Q4H PRN  
 furosemide (LASIX) injection 20 mg  20 mg IntraVENous DAILY  
______________________________________________________________________ EXPECTED LENGTH OF STAY: 3d 16h ACTUAL LENGTH OF STAY:          4 Helena Grajeda DO

## 2020-04-23 NOTE — PROGRESS NOTES
TRANSFER - IN REPORT: 
 
Verbal report received from Scooby Roberts RN(name) on [address]  being received from PACU(unit) for routine post - op Report consisted of patients Situation, Background, Assessment and  
Recommendations(SBAR). Information from the following report(s) SBAR, OR Summary and MAR was reviewed with the receiving nurse. Opportunity for questions and clarification was provided. Assessment completed upon patients arrival to unit and care assumed.

## 2020-04-23 NOTE — PROGRESS NOTES
Problem: Falls - Risk of 
Goal: *Absence of Falls Description: Document Shasha Umana Fall Risk and appropriate interventions in the flowsheet. Outcome: Progressing Towards Goal 
Note: Fall Risk Interventions: 
Mobility Interventions: Communicate number of staff needed for ambulation/transfer, Patient to call before getting OOB, OT consult for ADLs, PT Consult for mobility concerns, PT Consult for assist device competence, Utilize walker, cane, or other assistive device Medication Interventions: Evaluate medications/consider consulting pharmacy, Patient to call before getting OOB, Teach patient to arise slowly Elimination Interventions: Call light in reach, Patient to call for help with toileting needs, Toileting schedule/hourly rounds Problem: Patient Education: Go to Patient Education Activity Goal: Patient/Family Education Outcome: Progressing Towards Goal 
  
Problem: Pressure Injury - Risk of 
Goal: *Prevention of pressure injury Description: Document Calderon Scale and appropriate interventions in the flowsheet. Outcome: Progressing Towards Goal 
Note: Pressure Injury Interventions: 
  
 
Moisture Interventions: Absorbent underpads, Check for incontinence Q2 hours and as needed, Minimize layers Activity Interventions: Increase time out of bed, Pressure redistribution bed/mattress(bed type), PT/OT evaluation Mobility Interventions: Pressure redistribution bed/mattress (bed type), PT/OT evaluation, HOB 30 degrees or less Nutrition Interventions: Document food/fluid/supplement intake, Discuss nutritional consult with provider, Offer support with meals,snacks and hydration Friction and Shear Interventions: Apply protective barrier, creams and emollients, Lift sheet, Minimize layers Problem: Patient Education: Go to Patient Education Activity Goal: Patient/Family Education Outcome: Progressing Towards Goal 
  
Problem: Nutrition Deficit Goal: *Optimize nutritional status Outcome: Progressing Towards Goal 
  
Problem: Patient Education: Go to Patient Education Activity Goal: Patient/Family Education Outcome: Progressing Towards Goal 
  
Problem: Patient Education: Go to Patient Education Activity Goal: Patient/Family Education Outcome: Progressing Towards Goal

## 2020-04-23 NOTE — ANESTHESIA POSTPROCEDURE EVALUATION
Post-Anesthesia Evaluation and Assessment Patient: Graham Jones MRN: [id number]  SSN: [id number] YOB: 1935  Age: 82 y.o. Sex: female I have evaluated the patient and they are stable and ready for discharge from the PACU. Cardiovascular Function/Vital Signs Visit Vitals /50 Pulse 96 Temp 36.3 °C (97.3 °F) Resp 18 Ht 5' 1\" (1.549 m) Wt 63 kg (138 lb 14.2 oz) SpO2 98% BMI 26.24 kg/m² Patient is status post Other anesthesia for Procedure(s): REMOVAL PORT A CATH. Nausea/Vomiting: None Postoperative hydration reviewed and adequate. Pain: 
Pain Scale 1: Numeric (0 - 10) (04/23/20 1023) Pain Intensity 1: 0 (04/23/20 1023) Managed Neurological Status:  
Neuro (WDL): Exceptions to WDL (04/23/20 1023) Neuro Neurologic State: Alert;Drowsy (04/23/20 1023) Orientation Level: Oriented X4 (04/23/20 1023) Cognition: Follows commands (04/23/20 1023) Speech: Clear (04/23/20 1023) LUE Motor Response: Purposeful (04/23/20 1023) LLE Motor Response: Purposeful (04/23/20 1023) RUE Motor Response: Purposeful(strong. denies numbness or tingling) (04/23/20 1023) RLE Motor Response: Purposeful (04/23/20 1023) At baseline Mental Status, Level of Consciousness: Alert and  oriented to person, place, and time Pulmonary Status:  
O2 Device: Nasal cannula (04/23/20 1023) Adequate oxygenation and airway patent Complications related to anesthesia: None Post-anesthesia assessment completed. No concerns Signed By: Candida Kessler MD   
 April 23, 2020

## 2020-04-23 NOTE — PROGRESS NOTES
Infectious Disease Progress Note HPI: 
 
 
Ms Leonid Chen seen S/P port removal  
Says she has not had lunch yet and wants food Denied diarrhea, nausea, vomiting Denied dysuria , chills, night sweats Denied pain Medications: 
 
 
Current Facility-Administered Medications:  
  HYDROcodone-acetaminophen (NORCO) 5-325 mg per tablet 1 Tab, 1 Tab, Oral, Q4H PRN, Sandy Lott MD 
  bisacodyL (DULCOLAX) tablet 5 mg, 5 mg, Oral, DAILY PRN, Brigid Ruffin M,  
  bisacodyL (DULCOLAX) suppository 10 mg, 10 mg, Rectal, DAILY PRN, Brigid Ruffin, DO 
  senna-docusate (PERICOLACE) 8.6-50 mg per tablet 1 Tab, 1 Tab, Oral, DAILY, Sandy Lott MD, 1 Tab at 04/23/20 1137 
  calcium carbonate (TUMS) chewable tablet 200 mg [elemental], 200 mg, Oral, TID PRN, Sandy Lott MD, 200 mg at 04/22/20 2356   cefepime (MAXIPIME) 2 g in 0.9% sodium chloride (MBP/ADV) 100 mL, 2 g, IntraVENous, Q24H, Darvin Alcaraz MD, Last Rate: 200 mL/hr at 04/23/20 1141, 2 g at 04/23/20 1141   epoetin celio-epbx (RETACRIT) injection 10,000 Units, 10,000 Units, SubCUTAneous, Q MON, WED & Erica Primrose, MD, 10,000 Units at 04/22/20 2230 
  sodium chloride (NS) flush 5-10 mL, 5-10 mL, IntraVENous, PRN, Sandy Lott MD 
  aspirin delayed-release tablet 81 mg, 81 mg, Oral, DAILY, Sandy Lott MD, 81 mg at 04/23/20 1137   clopidogreL (PLAVIX) tablet 75 mg, 75 mg, Oral, DAILY, Sandy Lott MD, 75 mg at 04/23/20 1137   levothyroxine (SYNTHROID) tablet 88 mcg, 88 mcg, Oral, ACB, Sandy Lott MD, Stopped at 04/23/20 0700   polyethylene glycol (MIRALAX) packet 17 g, 17 g, Oral, DAILY, Darvin Aclaraz MD, 17 g at 04/23/20 1136 
  glucose chewable tablet 16 g, 4 Tab, Oral, PRN, Sandy Lott MD 
  glucagon (GLUCAGEN) injection 1 mg, 1 mg, IntraMUSCular, PRN, Sandy Lott MD 
  sodium chloride (NS) flush 5-40 mL, 5-40 mL, IntraVENous, Q8H, Lissa Kailyn Marshall MD, 10 mL at 04/23/20 0740 
  sodium chloride (NS) flush 5-40 mL, 5-40 mL, IntraVENous, PRN, Gustavo Stevenson MD 
  naloxone [facility]) injection 0.4 mg, 0.4 mg, IntraVENous, PRN, Gustavo Stevenson MD 
  dextrose 10% infusion 0-250 mL, 0-250 mL, IntraVENous, PRN, Gustavo Stevenson MD 
  insulin glargine (LANTUS) injection 14 Units, 14 Units, SubCUTAneous, QHS, Gustavo Stevenson MD, 14 Units at 04/22/20 2231 
  insulin lispro (HUMALOG) injection, , SubCUTAneous, AC&HS, Gustavo Stevenson MD, 2 Units at 04/23/20 1254   ondansetron (ZOFRAN) injection 4 mg, 4 mg, IntraVENous, Q4H PRN, Gustavo Stevenson MD, 4 mg at 04/20/20 0482   acetaminophen (TYLENOL) tablet 650 mg, 650 mg, Oral, Q4H PRN, Gustavo Stevenson MD, 650 mg at 04/21/20 2025   furosemide (LASIX) injection 20 mg, 20 mg, IntraVENous, DAILY, Gustavo Stevenson MD, 20 mg at 04/23/20 1138 Physical Exam: 
 
Vitals:  
Patient Vitals for the past 24 hrs: 
 Temp Pulse Resp BP SpO2  
04/23/20 1100 98.1 °F (36.7 °C) 99 19 100/57 95 % 04/23/20 1044 97.5 °F (36.4 °C)      
04/23/20 1035  95 20  98 % 04/23/20 1030  96 18 107/50 98 % 04/23/20 1025  98 20 105/50 98 % 04/23/20 1023 97.3 °F (36.3 °C)      
04/23/20 1020  100 21 107/51 98 % 04/23/20 1019  100 23  98 % 04/23/20 1018 97.3 °F (36.3 °C) (!) 101 21 109/55 98 % 04/23/20 1017  (!) 101 25  98 % 04/23/20 1016    109/55 98 % 04/23/20 0823 98.2 °F (36.8 °C) (!) 107 18 124/62 95 % 04/23/20 0415  (!) 102     
04/23/20 0242 98.8 °F (37.1 °C) (!) 118 18 123/67 94 % 04/22/20 1956 99.5 °F (37.5 °C) 100 18 126/70 96 % 04/22/20 1448 98.4 °F (36.9 °C) 99 17 124/64 96 % · GEN: NAD 
· HEENT: no scleral icterus,  no thrush · CV: S1, S2 heard regularly,  no erythema noted on chest wall / no bleeding noted · Lungs: Clear to auscultation bilaterally · Abdomen: soft, non distended, non tender · Extremities: no edema · Neuro: Alert, oriented to self, place and situation, moves all extremities to commands, verbal  
· Skin: no rash Labs:  
Recent Results (from the past 24 hour(s)) GLUCOSE, POC Collection Time: 04/22/20  4:15 PM  
Result Value Ref Range Glucose (POC) 285 (H) 65 - 100 mg/dL Performed by Jules Vigil (PCT) GLUCOSE, POC Collection Time: 04/22/20  9:41 PM  
Result Value Ref Range Glucose (POC) 226 (H) 65 - 100 mg/dL Performed by Geraldo Ferrera GLUCOSE, POC Collection Time: 04/23/20  7:27 AM  
Result Value Ref Range Glucose (POC) 183 (H) 65 - 100 mg/dL Performed by Nirmala Lorenzo GLUCOSE, POC Collection Time: 04/23/20 10:24 AM  
Result Value Ref Range Glucose (POC) 136 (H) 65 - 100 mg/dL Performed by Veena Gomes GLUCOSE, POC Collection Time: 04/23/20 11:28 AM  
Result Value Ref Range Glucose (POC) 141 (H) 65 - 100 mg/dL Performed by Lyudmila Crawford Microbiology Data: 
 
Wound 4/23/20 pending Blood 4/21/20 pending Blood: 4/19/20 Blood   
    
Component Value Ref Range & Units Status Special Requests: NO SPECIAL REQUESTS    Final  
Culture result: Abnormal     Final  
KLEBSIELLA PNEUMONIAE GROWING IN ALL 4 BOTTLES DRAWN (SITES = RAC AND LAC) Susceptibility Klebsiella pneumoniae FARHAN Amikacin ($) <=2 ug/mL S Ampicillin ($) >=32 ug/mL R Ampicillin/sulbactam ($) 8 ug/mL S Cefazolin ($) <=4 ug/mL S Cefepime ($$) <=1 ug/mL S Cefoxitin <=4 ug/mL S Ceftazidime ($) <=1 ug/mL S Ceftriaxone ($) <=1 ug/mL S Ciprofloxacin ($) <=0.25 ug/mL S   
Ext. Spec. Beta Lactamase Negative ug/mL S Gentamicin ($) <=1 ug/mL S Levofloxacin ($) <=0.12 ug/mL S Meropenem ($$) <=0.25 ug/mL S Piperacillin/Tazobac ($) <=4 ug/mL S Tobramycin ($) <=1 ug/mL S Trimeth/Sulfa <=20 ug/mL S Urine: 4/19/20 Clean catch; Urine   
    
Component Value Ref Range & Units Status Special Requests: NO SPECIAL REQUESTS    Final  
Hazel Count >100,000 COLONIES/mL    Final  
Culture result: KLEBSIELLA PNEUMONIAEAbnormal     Final  
Susceptibility Klebsiella pneumoniae FARHAN Amikacin ($) <=2 ug/mL S Ampicillin ($) >=32 ug/mL R Ampicillin/sulbactam ($) 8 ug/mL S Cefazolin ($) <=4 ug/mL S Cefepime ($$) <=1 ug/mL S Cefoxitin <=4 ug/mL S Ceftazidime ($) <=1 ug/mL S Ceftriaxone ($) <=1 ug/mL S Ciprofloxacin ($) <=0.25 ug/mL S   
Ext. Spec. Beta Lactamase Negative ug/mL S Gentamicin ($) <=1 ug/mL S Levofloxacin ($) <=0.12 ug/mL S Meropenem ($$) <=0.25 ug/mL S Nitrofurantoin 64 ug/mL I Piperacillin/Tazobac ($) <=4 ug/mL S Tobramycin ($) <=1 ug/mL S Trimeth/Sulfa <=20 ug/mL S   
 
 
 
12/28/19 BLOOD Component Value Ref Range & Units Status Special Requests: NO SPECIAL REQUESTS    Final  
Culture result: NO GROWTH 5 DAYS    Final  
Result History Urine 12/25/19 Urine   
    
Component Value Ref Range & Units Status Special Requests: NO SPECIAL REQUESTS Reflexed from [id number]    Final  
Hazel Count >100,000 COLONIES/mL    Final  
Culture result: ESCHERICHIA COLIAbnormal     Final  
Susceptibility Escherichia coli FARHAN Amikacin ($) <=16 ug/mL S Ampicillin ($) <=8 ug/mL S Ampicillin/sulbactam ($) <=8/4 ug/mL S Aztreonam ($$$$) <=4 ug/mL S Cefazolin ($) <=8 ug/mL S Cefepime ($$) <=4 ug/mL S Cefotaxime <=2 ug/mL S Ceftazidime ($) <=1 ug/mL S Ceftriaxone ($) <=1 ug/mL S Cefuroxime ($) <=4 ug/mL S Ciprofloxacin ($) <=1 ug/mL S Gentamicin ($) <=4 ug/mL S Imipenem <=1 ug/mL S Levofloxacin ($) <=2 ug/mL S Meropenem ($$) <=1 ug/mL S Nitrofurantoin <=32 ug/mL S Piperacillin/Tazobac ($) <=16 ug/mL S Tobramycin ($) <=4 ug/mL S Trimeth/Sulfa <=2/38 ug/mL S   
 
 
12/25/19 Blood Blood   
    
Component Value Ref Range & Units Status Special Requests: NO SPECIAL REQUESTS    Final  
 Culture result: Abnormal     Final  
ESCHERICHIA COLI GROWING IN ALL 4 BOTTLES DRAWN (SITE=RAC AND L PORT) Culture result: Abnormal     Final  
PRELIMINARY REPORT OF GRAM NEGATIVE RODS GROWING IN 3 OF 4 BOTTLES DRAWN CALLED TO AND READ BACK BY [address] [place] [address] Street, RN ON 12/26/19 AT 9698. [address] Susceptibility Escherichia coli FARHAN Amikacin ($) <=16 ug/mL S Ampicillin ($) <=8 ug/mL S Ampicillin/sulbactam ($) <=8/4 ug/mL S Aztreonam ($$$$) <=4 ug/mL S Cefazolin ($) <=8 ug/mL S Cefepime ($$) <=4 ug/mL S Cefotaxime <=2 ug/mL S Ceftazidime ($) <=1 ug/mL S Ceftriaxone ($) <=1 ug/mL S Cefuroxime ($) <=4 ug/mL S Ciprofloxacin ($) <=1 ug/mL S Gentamicin ($) <=4 ug/mL S Imipenem <=1 ug/mL S Levofloxacin ($) <=2 ug/mL S Meropenem ($$) <=1 ug/mL S Piperacillin/Tazobac ($) <=16 ug/mL S Tobramycin ($) <=4 ug/mL S Trimeth/Sulfa <=2/38 ug/mL S   
 
 
6/20/18 6/21/2018 10:58 AM - Moi, Lab In [facility]  
 
Specimen Information: Blood  
    
Component Value Ref Range & Units Status Special Requests: NO SPECIAL REQUESTS    Final  
Culture result: Abnormal     Final  
STAPHYLOCOCCUS SPECIES, COAGULASE NEGATIVE GROWING IN 1 OF 4 BOTTLES DRAWN (SITE = R ARM) Culture result:    Final  
REMAINING BOTTLE(S) HAS/HAVE NO GROWTH IN 5 DAYS Imaging: TTE 4/20/20 Interpretation Summary · Normal cavity size and wall thickness. Mild-to-moderate systolic function. Estimated left ventricular ejection fraction is 40 - 45%. Abnormal left ventricular wall motion. Age-appropriate left ventricular diastolic function. · Moderately dilated left atrium. · Mildly dilated right atrium. · Color flow Doppler was used. · Aortic valve leaflet calcification present with reduced excursion. Aortic valve mean gradient is 17 mmHg. Aortic valve area is 1.4 cm2. Moderate aortic valve regurgitation is present. · Mitral valve non-specific thickening.  Mild to moderate mitral valve regurgitation is present. · Mild to moderate tricuspid valve regurgitation is present. · Moderate pulmonary hypertension. Pulmonary arterial systolic pressure is 55 mmHg. · Small posterior pericardial effusion. Echo Findings Left Ventricle Normal cavity size and wall thickness. Mild-to-moderate systolic dysfunction. The estimated ejection fraction is 40 - 45%. LV wall motion is noted to be abnormal and apex. There is age-appropriate left ventricular diastolic function. Wall Scoring The following segments are hypokinetic: apical anterior, apical inferior and apex. Other segments could not be evaluated. Left Atrium Moderately dilated left atrium. Patent foramen ovale visualized with left to right shunting indicated by color flow Doppler. No closure device present. Right Ventricle Normal cavity size and global systolic function. Right Atrium Mildly dilated right atrium. Interatrial Septum There was a moderate left to right shunt, shown on color Doppler. Aortic Valve Trileaflet valve structure and no stenosis. There is leaflet calcification with reduced excursion. Aortic valve mean gradient is 17 mmHg. Aortic valve area is 1.4 cm2. Moderate aortic valve regurgitation. There is no vegetation on the aortic valve. Mitral Valve No stenosis. Mitral valve non-specific thickening. Mild to moderate regurgitation. There is no vegetation on the mitral valve. Tricuspid Valve Normal valve structure and no stenosis. Mild to moderate regurgitation. There is no vegetation on the tricuspid valve. Pulmonic Valve Pulmonic valve not well visualized. Aorta Normal aortic root. Pulmonary Artery Pulmonary arterial systolic pressure (PASP) is 55 mmHg. Pulmonary hypertension found to be moderate. Pericardium Small posterior pericardial effusion noted. No indications of tamponade present. Interpretation Summary · Normal cavity size and wall thickness.  Mild-to-moderate systolic function. Estimated left ventricular ejection fraction is 40 - 45%. Abnormal left ventricular wall motion. Age-appropriate left ventricular diastolic function. · Moderately dilated left atrium. · Mildly dilated right atrium. · Color flow Doppler was used. · Aortic valve leaflet calcification present with reduced excursion. Aortic valve mean gradient is 17 mmHg. Aortic valve area is 1.4 cm2. Moderate aortic valve regurgitation is present. · Mitral valve non-specific thickening. Mild to moderate mitral valve regurgitation is present. · Mild to moderate tricuspid valve regurgitation is present. · Moderate pulmonary hypertension. Pulmonary arterial systolic pressure is 55 mmHg. · Small posterior pericardial effusion. CXR 4/19/20 PA and lateral views demonstrate normal heart size. Persistent mild diffuse 
interstitial prominence again noted. Osseous metastatic disease is unchanged. Port-A-Cath is able in position. Calcified granuloma again noted in the right 
apex. Old left rib fractures. 
  
IMPRESSION Impression: Persistent mild diffuse interstitial prominence again noted and 
grossly unchanged compared to 12/31/2019. Stable osseous metastatic disease. Assessment / Plan:  
 
Ms Richardson Terry Is a 27-year-old lady with a history of metastatic breast cancer with an indwelling port status post chemotherapy about a week ago, rheumatoid arthritis, coronary artery disease, osteoporosis, systolic heart failure, colon cancer, diabetes, E. coli bacteremia in December 2019 who presents with fatigue, fevers, feeling cold and nausea. During this admission, noted to have a T-max of 102.9 and initially hypotensive with a systolic blood pressure in the 80s. She was started on ceftriaxone IV. Labs show uptrending leukocytosis with neutrophilic predominance. She also has some lymphocytopenia.   Her chest x-ray has been read as \" persistent mild diffuse interstitial prominence again noted and grossly unchanged compared to 12/31/2019. Stable osseous metastatic disease. \" Her blood cultures dated 4/19/2020 is positive for gram-negative arturo in all 4 bottles obtained from right AC and left AC. Urine culture from 4/19/2020 also has been in the 100,000 gram-negative rods. Her urine analysis is positive for large leukocyte Estrace, negative nitrates, 2+ bacteria and greater than 100 white count. Of note, she had E. coli grew out of blood cultures dated 12/25/19 and the site has been indicated as  \"Right AC and left port \". Repeat blood culture on 12/28/2020 was negative. A transthoracic echo from 12/27/2019 was read as aortic valve sclerosis moderate regurgitation of the mitral valve. Tricuspid and pulmonic valves were read as normal valve structure. It appears that she was treated with IV antibiotics initially and then transition to oral Cipro to complete a 14-day course of antibiotic therapy to be stopped on 1/7/2020. 
 
 
1) Klebsiella bacteremia in the setting of chemotherapy a week ago with an indwelling port Of note she had E. coli growing in her blood cultures in December 2019. This was reported to be right before meals and left port as site. Her port however is on the right chest area Recommendations S/P port removal 4/23/20 IV cefepime renally dosed by pharmacy, currently on 2gm Q 24 hours Repeat blood cultures pending from 4/21/20 Check blood cx today since port removal  
 
 
 
A transthoracic echo was read as no vegetation on aortic, tricuspid and mitral valve. Pulmonic valve could not be visualized well Side effects of antibiotics discussed with the patient including nausea vomiting diarrhea risk for C. difficile, seizures 2) metastatic breast cancer Reviewed her last oncology note from 1/6/2020. \" Has had nice response to chemo (abraxane/xeloda) with recent documented decrease in hepatic and axillary involvement. .. her last rx was 12/16 (cycle 5 ) she received 7 days of xeloda ending on 12/22. Amado Gonzalez than neulasta on 12/23\" 3) coronary artery disease, systolic dysfunction Plans per cardiology Noted she is on Plavix 4) rheumatoid arthritis We will need to clarify if she is getting a DMARD or any immunosuppressive medication which increases the risk for infections 5) marked leukocytosis likely in the setting of bacteremia Continue to monitor as high risk for C. difficile infection as well which can present with marked leukocytosis 6) history of colon cancer per chart 7) osteoporosis per chart 8) DVT prophylaxis per primary team, noted she is on Plavix Thank for the opportunity to participate in the care of this patient. Please contact with questions or concerns.   
 
Eve Pollard,  
1:02 PM

## 2020-04-23 NOTE — OP NOTES
[address]  
OPERATIVE REPORT Name:  Angelo Deshpande 
MR#:  [id number] :  1935 ACCOUNT #:  [de-identified] DATE OF SERVICE:  2020 PREOPERATIVE DIAGNOSIS:  Infected Port-A-Cath system. POSTOPERATIVE DIAGNOSIS:  Infected Port-A-Cath system. PROCEDURE PERFORMED:  Removal of infected Port-A-Cath system. SURGEON:  Lis Bradford MD 
 
ASSISTANT:  None. ANESTHESIA:  Local with IV sedation. COMPLICATIONS:  None. SPECIMENS REMOVED:  Fluid from right chest wound for culture and sensitivities. IMPLANTS:  None. ESTIMATED BLOOD LOSS:  Minimal. 
 
DRAINS:  None. COUNTS:  Sponge count correct. Needle count correct. INDICATIONS:  The patient is an 53-year-old [facility] American female with multiple medical problems to include heart disease, diabetes, renal insufficiency, and breast cancer. She is currently undergoing adjuvant treatment and was admitted with urosepsis with bacteremia. White blood cell count has remained elevated, despite IV antibiotics. It is felt her port system is infected and will need to be removed for her to clear her infection. She is taken to the operating room today for port system removal. 
 
FINDINGS:  Removal of port system. PROCEDURE:  The patient was identified as the correct patient in the preoperative holding area and informed consent was confirmed. After answering the patient's remaining questions, she was taken to the operating room and placed on the operating room table in supine position. Following the initiation of intravenous sedation, sequential compression devices were placed on both lower extremities, and all potential pressure points were padded with eggcrate. Her right chest was prepped and draped in the usual sterile fashion. Final time-out was performed, and it was confirmed she had received scheduled intravenous antibiotics. The skin overlying the port was anesthetized with bupivacaine.   After confirming adequate anesthesia, an incision was made through the prior scar. The dissection was carried through the dermis to the level of the port. The port was mobilized from surrounding tissue using electrocautery and blunt dissection, followed by port system removal.  Samples were taken from the port and the catheter and sent for cultures and sensitivities. The wound was irrigated with sterile saline. After confirming adequate hemostasis, the dermal layer was reapproximated with a running 3-0 Vicryl suture. Skin edges were reapproximated with a combination of subcuticular 4-0 Monocryl suture and Dermabond. The patient tolerated the procedure well. She was transferred to the recovery area in stable condition. The attending surgeon, Dr. Gabriella Arechiga, was scrubbed and present for the entire procedure. Mariano An MD 
 
 
[id number] 
D:  04/23/2020 10:08 
T:  04/23/2020 18:36 JOB #:  [id number]

## 2020-04-23 NOTE — PROGRESS NOTES
Problem: Falls - Risk of 
Goal: *Absence of Falls Description: Document Scott Quiñones Fall Risk and appropriate interventions in the flowsheet. Outcome: Progressing Towards Goal 
Note: Fall Risk Interventions: 
Mobility Interventions: Communicate number of staff needed for ambulation/transfer Medication Interventions: Evaluate medications/consider consulting pharmacy Elimination Interventions: Call light in reach, Toileting schedule/hourly rounds Problem: Patient Education: Go to Patient Education Activity Goal: Patient/Family Education Outcome: Progressing Towards Goal 
  
Problem: Pressure Injury - Risk of 
Goal: *Prevention of pressure injury Description: Document Calderon Scale and appropriate interventions in the flowsheet. Outcome: Progressing Towards Goal 
Note: Pressure Injury Interventions: 
  
 
Moisture Interventions: Absorbent underpads Activity Interventions: Increase time out of bed, Pressure redistribution bed/mattress(bed type) Mobility Interventions: Pressure redistribution bed/mattress (bed type) Nutrition Interventions: Document food/fluid/supplement intake, Offer support with meals,snacks and hydration Friction and Shear Interventions: Apply protective barrier, creams and emollients, Lift sheet Problem: Patient Education: Go to Patient Education Activity Goal: Patient/Family Education Outcome: Progressing Towards Goal 
  
Problem: Nutrition Deficit Goal: *Optimize nutritional status Outcome: Progressing Towards Goal

## 2020-04-24 NOTE — PROGRESS NOTES
Hematology-Oncology Progress Note Sandy Ledesma 
1935 
[id number] 
4/24/2020 Follow-up for: metastatic breast cancer [x]        Chart notes since last visit reviewed [x]        Medications reviewed for allergies and interactions Case discussed with the following:         []            
               []        Nursing Staff  
                                                                      []        Pathologist 
                                                                      []        FAMILY Subjective:  
 
Spoke with patient who complains of: she is feeling better, no fever, 
 
Objective:  
 
Patient Vitals for the past 24 hrs: 
 BP Temp Pulse Resp SpO2 Weight 04/24/20 0928 112/58 98.9 °F (37.2 °C) 63 18 97 %   
04/24/20 0815      63.3 kg (139 lb 8.8 oz) 04/24/20 0349 138/79 98.7 °F (37.1 °C) (!) 103 17 97 %   
04/23/20 2135 136/84 99.2 °F (37.3 °C) (!) 110 18 100 %  REVIEW OF SYSTEMS:   
Constitutional: negative fever, negative chills, negative weight loss Eyes:   negative visual changes ENT:   negative sore throat, tongue or lip swelling Respiratory:  negative cough, negative dyspnea Cards:  negative for chest pain, palpitations, lower extremity edema GI:   negative for nausea, vomiting, diarrhea, and abdominal pain Neuro:  negative for headaches, dizziness, vertigo 
[]                        Full ROS o/w normal/non contributor Constitutional:  Patient looks []        Sick []        Frail 
[x]        Better                                                
[]        Depressed HEENT:  [x]   NC                         []   AT               [x]    ALOPECIA Eyes: [x]   Normal               []    Icteric Oropharynx: [x]    Normal                  []  Thrush               []   Dry Mucositis: [x]    None                 Grade: []        I  []        II  []        III  []        IV 
 Neck:   [x]   Supple                  []  Rigid JVD:    [x]   ABSENT       []   PRESENT Lymphadenopathy:   [x]   None Noted            []   PRESENT 
 
           []  EDGE - palp Mass:   [x]   ABSENT                          []  PRESENT Extr:    []  Lymphedema             []   Cyanosis      []  Clubbing Edema:     [x]   NONE       []   PRESENT Skin:  Intact [x]           Purpura [] Rash: [x]   ABSENT       []  PRESENT Neuro: 
[x]        Normal 
[]        Confused Available labs reviewed: 
Labs:   
Recent Results (from the past 24 hour(s)) GLUCOSE, POC Collection Time: 04/23/20  4:23 PM  
Result Value Ref Range Glucose (POC) 197 (H) 65 - 100 mg/dL Performed by Javi Smith (ELKIN) CULTURE, BLOOD, PAIRED Collection Time: 04/23/20  6:52 PM  
Result Value Ref Range Special Requests: NO SPECIAL REQUESTS Culture result: NO GROWTH AFTER 13 HOURS    
GLUCOSE, POC Collection Time: 04/23/20  9:35 PM  
Result Value Ref Range Glucose (POC) 208 (H) 65 - 100 mg/dL Performed by [address] [place] [address], [facility] Collection Time: 04/24/20  3:53 AM  
Result Value Ref Range Sodium 137 136 - 145 mmol/L Potassium 3.9 3.5 - 5.1 mmol/L Chloride 106 97 - 108 mmol/L  
 CO2 23 21 - 32 mmol/L Anion gap 8 5 - 15 mmol/L Glucose 126 (H) 65 - 100 mg/dL BUN 37 (H) 6 - 20 MG/DL Creatinine 1.23 (H) 0.55 - 1.02 MG/DL  
 BUN/Creatinine ratio 30 (H) 12 - 20 GFR est AA 51 (L) >60 ml/min/1.73m2 GFR est non-AA 42 (L) >60 ml/min/1.73m2 Calcium 8.9 8.5 - 10.1 MG/DL  
CBC W/O DIFF Collection Time: 04/24/20  3:53 AM  
Result Value Ref Range WBC 13.5 (H) 3.6 - 11.0 K/uL  
 RBC 2.97 (L) 3.80 - 5.20 M/uL HGB 8.9 (L) 11.5 - 16.0 g/dL HCT 26.8 (L) 35.0 - 47.0 % MCV 90.2 80.0 - 99.0 FL  
 MCH 30.0 26.0 - 34.0 PG  
 MCHC 33.2 30.0 - 36.5 g/dL  
 RDW 19.8 (H) 11.5 - 14.5 % PLATELET 752 763 - 507 K/uL  MPV 10.5 8.9 - 12.9 FL  
 NRBC 0.3 (H) 0  WBC ABSOLUTE NRBC 0.04 (H) 0.00 - 0.01 K/uL GLUCOSE, POC Collection Time: 04/24/20  7:21 AM  
Result Value Ref Range Glucose (POC) 131 (H) 65 - 100 mg/dL Performed by [address] [place] [address] Drive, POC Collection Time: 04/24/20 11:07 AM  
Result Value Ref Range Glucose (POC) 264 (H) 65 - 100 mg/dL Performed by [facility] Available Xrays reviewed: 
 
Chemotherapy monitored and toxicities assessed: 
 
Assessment and Plan 1. Met. Breast cancer. . currently on abraxane/xeloda. John Jeff q 28 day, was due to start next cycle next week. . will  hold. She is responding to Rx with documented improvement in adenopathy, liver mets on scans done this spring as well as current physical exam 
 
2. Anemia. Stable hgb 8.9 today,,,secodary to chemo continue procrit 3. klebsiella bacteremia (4/19). secondary to UTI. .. . improving on cefipime. John Jeff Port was removed 4/22 at [facility] request.. the cultures from 4/21 are ngsf 4. Leukocytosis. John [place] secondary to neulasta and #3,, improving Ricardo Madera MD

## 2020-04-24 NOTE — PROGRESS NOTES
Problem: Mobility Impaired (Adult and Pediatric) Goal: *Acute Goals and Plan of Care (Insert Text) Description: FUNCTIONAL STATUS PRIOR TO ADMISSION: Patient was modified independent using a rolling walker for functional mobility. Pt utilizes w/c for community mobility. HOME SUPPORT PRIOR TO ADMISSION: Patient lived with sons and has HHA approx 8 hours/day for 7 days/wk. Pt received assistance for ADLs and IADLs. Physical Therapy Goals Initiated 4/21/2020 1. Patient will scoot up and down and roll side to side in bed with modified independence within 7 day(s). 2.  Patient will transfer from bed to chair and chair to bed with modified independence using the least restrictive device within 7 day(s). 3.  Patient will perform sit to stand with modified independence within 7 day(s). 4.  Patient will ambulate with supervision/set-up for 300 feet with the least restrictive device within 7 day(s). 5.  Patient will ascend/descend 2 stairs with both handrail(s) with minimal assistance/contact guard assist within 7 day(s). Outcome: Progressing Towards Goal 
 PHYSICAL THERAPY TREATMENT Patient: Maria Guadalupe Yarbrough (82 y.o. female) Date: 4/24/2020 Diagnosis: Systemic inflammatory response syndrome (HCC) [R65.10] UTI (urinary tract infection) [N39.0] <principal problem not specified> Procedure(s) (LRB): REMOVAL PORT A CATH (Right) 1 Day Post-Op Precautions:   
Chart, physical therapy assessment, plan of care and goals were reviewed. ASSESSMENT Patient continues with skilled PT services and is progressing towards goals. Patient received in bed and requiring verbal cues for getting to EOB. Ambulated well with RW with increased distance today and only supervision. Does require VC for hand placement for stand to sit and reviewed with her the safety importance of using hands to assist.  Stood to perform perineal care with supervision.   Feel patient is likely close to baseline and has good support system at home therefore safe to return home with home health. Current Level of Function Impacting Discharge (mobility/balance): stand by assist to contact guard Other factors to consider for discharge: PLAN : 
Patient continues to benefit from skilled intervention to address the above impairments. Continue treatment per established plan of care. to address goals. Recommendation for discharge: (in order for the patient to meet his/her long term goals) Physical therapy at least 2 days/week in the home AND ensure assist and/or supervision for safety with general mobility This discharge recommendation: 
Has been made in collaboration with the attending provider and/or case management IF patient discharges home will need the following DME: patient owns DME required for discharge SUBJECTIVE:  
Patient stated I have good help at home.  OBJECTIVE DATA SUMMARY:  
Critical Behavior: 
Neurologic State: Alert Orientation Level: Oriented X4 Cognition: Follows commands Safety/Judgement: Awareness of environment, Decreased awareness of need for safety Functional Mobility Training: 
Bed Mobility: 
  
Supine to Sit: Stand-by assistance Scooting: Supervision Transfers: 
Sit to Stand: Supervision Stand to Sit: Supervision Balance: 
Sitting: Intact Standing: Impaired; With support Standing - Static: Good Standing - Dynamic : Good Ambulation/Gait Training: 
Distance (ft): 80 Feet (ft) Assistive Device: Gait belt;Walker, rolling Ambulation - Level of Assistance: Supervision Speed/Haylee: Pace decreased (<100 feet/min) Step Length: Right shortened;Left shortened After treatment patient left in no apparent distress:  
Sitting in chair and Call bell within reach COMMUNICATION/COLLABORATION:  
The patients plan of care was discussed with: Registered nurse, Physician, and Case management. Arcadio Keyes PT Time Calculation: 19 mins

## 2020-04-24 NOTE — PROGRESS NOTES
Problem: Falls - Risk of 
Goal: *Absence of Falls Description: Document Chris Mosley Fall Risk and appropriate interventions in the flowsheet. Outcome: Progressing Towards Goal 
Note: Fall Risk Interventions: 
Mobility Interventions: Communicate number of staff needed for ambulation/transfer Medication Interventions: Evaluate medications/consider consulting pharmacy Elimination Interventions: Patient to call for help with toileting needs Problem: Patient Education: Go to Patient Education Activity Goal: Patient/Family Education Outcome: Progressing Towards Goal 
  
Problem: Pressure Injury - Risk of 
Goal: *Prevention of pressure injury Description: Document Calderon Scale and appropriate interventions in the flowsheet. Outcome: Progressing Towards Goal 
Note: Pressure Injury Interventions: 
  
 
Moisture Interventions: Absorbent underpads Activity Interventions: Increase time out of bed Mobility Interventions: Turn and reposition approx. every two hours(pillow and wedges) Nutrition Interventions: Document food/fluid/supplement intake Friction and Shear Interventions: Feet elevated on foot rest 
 
  
 
 
 
  
Problem: Patient Education: Go to Patient Education Activity Goal: Patient/Family Education Outcome: Progressing Towards Goal 
  
Problem: Heart Failure: Day 1 Goal: Off Pathway (Use only if patient is Off Pathway) Outcome: Progressing Towards Goal 
Goal: Activity/Safety Outcome: Progressing Towards Goal 
Goal: Consults, if ordered Outcome: Progressing Towards Goal 
Goal: Diagnostic Test/Procedures Outcome: Progressing Towards Goal 
Goal: Nutrition/Diet Outcome: Progressing Towards Goal 
Goal: Discharge Planning Outcome: Progressing Towards Goal 
Goal: Medications Outcome: Progressing Towards Goal 
Goal: Respiratory Outcome: Progressing Towards Goal 
Goal: Treatments/Interventions/Procedures Outcome: Progressing Towards Goal 
Goal: Psychosocial 
 Outcome: Progressing Towards Goal 
Goal: *Oxygen saturation within defined limits Outcome: Progressing Towards Goal 
Goal: *Hemodynamically stable Outcome: Progressing Towards Goal 
Goal: *Optimal pain control at patient's stated goal 
Outcome: Progressing Towards Goal 
Goal: *Anxiety reduced or absent Outcome: Progressing Towards Goal 
  
Problem: Heart Failure: Day 2 Goal: Off Pathway (Use only if patient is Off Pathway) Outcome: Progressing Towards Goal 
Goal: Activity/Safety Outcome: Progressing Towards Goal 
Goal: Consults, if ordered Outcome: Progressing Towards Goal 
Goal: Diagnostic Test/Procedures Outcome: Progressing Towards Goal 
Goal: Nutrition/Diet Outcome: Progressing Towards Goal 
Goal: Discharge Planning Outcome: Progressing Towards Goal 
Goal: Medications Outcome: Progressing Towards Goal 
Goal: Respiratory Outcome: Progressing Towards Goal 
Goal: Treatments/Interventions/Procedures Outcome: Progressing Towards Goal 
Goal: Psychosocial 
Outcome: Progressing Towards Goal 
Goal: *Oxygen saturation within defined limits Outcome: Progressing Towards Goal 
Goal: *Hemodynamically stable Outcome: Progressing Towards Goal 
Goal: *Optimal pain control at patient's stated goal 
Outcome: Progressing Towards Goal 
Goal: *Anxiety reduced or absent Outcome: Progressing Towards Goal 
Goal: *Demonstrates progressive activity Outcome: Progressing Towards Goal 
  
Problem: Heart Failure: Day 3 Goal: Off Pathway (Use only if patient is Off Pathway) Outcome: Progressing Towards Goal 
Goal: Activity/Safety Outcome: Progressing Towards Goal 
Goal: Diagnostic Test/Procedures Outcome: Progressing Towards Goal 
Goal: Nutrition/Diet Outcome: Progressing Towards Goal 
Goal: Discharge Planning Outcome: Progressing Towards Goal 
Goal: Medications Outcome: Progressing Towards Goal 
Goal: Respiratory Outcome: Progressing Towards Goal 
Goal: Treatments/Interventions/Procedures Outcome: Progressing Towards Goal 
Goal: Psychosocial 
Outcome: Progressing Towards Goal 
Goal: *Oxygen saturation within defined limits Outcome: Progressing Towards Goal 
Goal: *Hemodynamically stable Outcome: Progressing Towards Goal 
Goal: *Optimal pain control at patient's stated goal 
Outcome: Progressing Towards Goal 
Goal: *Anxiety reduced or absent Outcome: Progressing Towards Goal 
Goal: *Demonstrates progressive activity Outcome: Progressing Towards Goal 
  
Problem: Nutrition Deficit Goal: *Optimize nutritional status Outcome: Progressing Towards Goal 
  
Problem: Patient Education: Go to Patient Education Activity Goal: Patient/Family Education Outcome: Progressing Towards Goal 
  
Problem: Patient Education: Go to Patient Education Activity Goal: Patient/Family Education Outcome: Progressing Towards Goal

## 2020-04-24 NOTE — PROGRESS NOTES
Problem: Falls - Risk of 
Goal: *Absence of Falls Description: Document Henri Led Fall Risk and appropriate interventions in the flowsheet. Outcome: Progressing Towards Goal 
Note: Fall Risk Interventions: 
Mobility Interventions: Communicate number of staff needed for ambulation/transfer, OT consult for ADLs, PT Consult for assist device competence, Patient to call before getting OOB, PT Consult for mobility concerns, Utilize walker, cane, or other assistive device Medication Interventions: Evaluate medications/consider consulting pharmacy, Patient to call before getting OOB, Teach patient to arise slowly Elimination Interventions: Call light in reach, Patient to call for help with toileting needs, Stay With Me (per policy), Toileting schedule/hourly rounds Problem: Pressure Injury - Risk of 
Goal: *Prevention of pressure injury Description: Document Calderon Scale and appropriate interventions in the flowsheet. Outcome: Progressing Towards Goal 
Note: Pressure Injury Interventions: 
  
 
Moisture Interventions: Absorbent underpads, Check for incontinence Q2 hours and as needed, Limit adult briefs, Minimize layers Activity Interventions: Increase time out of bed, Pressure redistribution bed/mattress(bed type), PT/OT evaluation Mobility Interventions: HOB 30 degrees or less, Pressure redistribution bed/mattress (bed type), PT/OT evaluation Nutrition Interventions: Document food/fluid/supplement intake, Offer support with meals,snacks and hydration Friction and Shear Interventions: Apply protective barrier, creams and emollients, Lift sheet, Minimize layers Problem: Nutrition Deficit Goal: *Optimize nutritional status Outcome: Progressing Towards Goal 
  
Problem: Patient Education: Go to Patient Education Activity Goal: Patient/Family Education Outcome: Progressing Towards Goal 
  
Problem: Patient Education: Go to Patient Education Activity Goal: Patient/Family Education Outcome: Progressing Towards Goal

## 2020-04-24 NOTE — PROGRESS NOTES
CM continuing to follow. CM spoke with with Dr. Elisha Chiu earlier today regarding plans for home IV abx. Patient d/c should occur Sat or Sun.  CM coordinating services as much as possible without  IV abx orders in place. If plan is for IV abx at [facility] the [facility] orders are completed and placed on the hard chart. Patient has been known to [facility]. Services ended on 3/25/20. CM contacted agency to verify services and confirmed with ARIANA Conde. CM advised of plans for patient to d/c home with home IV abx. CM sent referral through CC link. Per [facility], staffing should not be a problem over the weekend. Orders are still needed for [facility] SN picc line care, PT/OT. CM sending referral to [facility] for infusion services. Referral sent via [employer]. Orders are needed for Home IV abx.  
 
JEFF Simental, CRM

## 2020-04-24 NOTE — PROGRESS NOTES
Infectious Disease Progress Note HPI: 
 
 
Ms Tere Perales seen S/P port removal  
Denied diarrhea, nausea, vomiting Denied dysuria , chills, night sweats Denied pain Says she feels tired but doing better overall Medications: 
 
 
Current Facility-Administered Medications:  
  HYDROcodone-acetaminophen (NORCO) 5-325 mg per tablet 1 Tab, 1 Tab, Oral, Q4H PRN, Gustavo Stevenson MD 
  bisacodyL (DULCOLAX) tablet 5 mg, 5 mg, Oral, DAILY PRN, ROSE MARY [facility] M, DO 
  bisacodyL (DULCOLAX) suppository 10 mg, 10 mg, Rectal, DAILY PRN, Kera Both M, DO, 10 mg at 04/23/20 1846 
  magnesium hydroxide (MILK OF MAGNESIA) 400 mg/5 mL oral suspension 30 mL, 30 mL, Oral, DAILY PRN, Brigid Prieto M, DO 
  senna-docusate (PERICOLACE) 8.6-50 mg per tablet 1 Tab, 1 Tab, Oral, DAILY, Gustavo Stevenson MD, 1 Tab at 04/24/20 6225   calcium carbonate (TUMS) chewable tablet 200 mg [elemental], 200 mg, Oral, TID PRN, Gustavo Stevenson MD, 200 mg at 04/22/20 2356   cefepime (MAXIPIME) 2 g in 0.9% sodium chloride (MBP/ADV) 100 mL, 2 g, IntraVENous, Q24H, Darvin Alcaraz MD, Last Rate: 200 mL/hr at 04/24/20 1132, 2 g at 04/24/20 1132   epoetin celio-epbx (RETACRIT) injection 10,000 Units, 10,000 Units, SubCUTAneous, Q MON, WED & Kristin Tuttle MD, 10,000 Units at 04/22/20 2230 
  sodium chloride (NS) flush 5-10 mL, 5-10 mL, IntraVENous, PRN, Gustavo Stevenson MD 
  aspirin delayed-release tablet 81 mg, 81 mg, Oral, DAILY, Gustavo Stevenson MD, 81 mg at 04/24/20 2084   clopidogreL (PLAVIX) tablet 75 mg, 75 mg, Oral, DAILY, Gustavo Stevenson MD, 75 mg at 04/24/20 7636   levothyroxine (SYNTHROID) tablet 88 mcg, 88 mcg, Oral, ACB, Gustavo Stevenson MD, 88 mcg at 04/24/20 2953   polyethylene glycol (MIRALAX) packet 17 g, 17 g, Oral, DAILY, Gustavo Stevenson MD, 17 g at 04/23/20 1136 
  glucose chewable tablet 16 g, 4 Tab, Oral, PRN, Gustavo Stevenson MD 
   glucagon (GLUCAGEN) injection 1 mg, 1 mg, IntraMUSCular, PRN, Trey Biggs MD 
  sodium chloride (NS) flush 5-40 mL, 5-40 mL, IntraVENous, Q8H, Trey Biggs MD, 10 mL at 04/24/20 1135   sodium chloride (NS) flush 5-40 mL, 5-40 mL, IntraVENous, PRN, Trey Biggs MD 
  naloxone [facility]) injection 0.4 mg, 0.4 mg, IntraVENous, PRN, Trey Biggs MD 
  dextrose 10% infusion 0-250 mL, 0-250 mL, IntraVENous, PRN, Trey Biggs MD 
  insulin glargine (LANTUS) injection 14 Units, 14 Units, SubCUTAneous, QHS, Trey Biggs MD, 14 Units at 04/23/20 2250 
  insulin lispro (HUMALOG) injection, , SubCUTAneous, AC&HS, Trey Biggs MD, 5 Units at 04/24/20 1132   ondansetron (ZOFRAN) injection 4 mg, 4 mg, IntraVENous, Q4H PRN, Trey Biggs MD, 4 mg at 04/20/20 1747   acetaminophen (TYLENOL) tablet 650 mg, 650 mg, Oral, Q4H PRN, Trey Biggs MD, 650 mg at 04/21/20 2025   furosemide (LASIX) injection 20 mg, 20 mg, IntraVENous, DAILY, Trey Biggs MD, 20 mg at 04/24/20 6100 Physical Exam: 
 
Vitals:  
Patient Vitals for the past 24 hrs: 
 Temp Pulse Resp BP SpO2  
04/24/20 0928 98.9 °F (37.2 °C) 63 18 112/58 97 % 04/24/20 0349 98.7 °F (37.1 °C) (!) 103 17 138/79 97 % 04/23/20 2135 99.2 °F (37.3 °C) (!) 110 18 136/84 100 % 04/23/20 1400 97.7 °F (36.5 °C) 99 18 144/76 96 % · GEN: NAD 
· HEENT: no scleral icterus,  no thrush · CV: S1, S2 heard regularly,  no erythema noted on chest wall / no bleeding noted · Lungs: Clear to auscultation bilaterally · Abdomen: soft, non distended, non tender · Extremities: no edema · Neuro: Alert, oriented to self, place and situation, moves all extremities to commands, verbal  
· Skin: no rash Labs:  
Recent Results (from the past 24 hour(s)) GLUCOSE, POC Collection Time: 04/23/20  4:23 PM  
Result Value Ref Range Glucose (POC) 197 (H) 65 - 100 mg/dL Performed by Glenn Contreras (ELKIN) CULTURE, BLOOD, PAIRED Collection Time: 04/23/20  6:52 PM  
Result Value Ref Range Special Requests: NO SPECIAL REQUESTS Culture result: NO GROWTH AFTER 13 HOURS    
GLUCOSE, POC Collection Time: 04/23/20  9:35 PM  
Result Value Ref Range Glucose (POC) 208 (H) 65 - 100 mg/dL Performed by [address] [place] [address], [facility] Collection Time: 04/24/20  3:53 AM  
Result Value Ref Range Sodium 137 136 - 145 mmol/L Potassium 3.9 3.5 - 5.1 mmol/L Chloride 106 97 - 108 mmol/L  
 CO2 23 21 - 32 mmol/L Anion gap 8 5 - 15 mmol/L Glucose 126 (H) 65 - 100 mg/dL BUN 37 (H) 6 - 20 MG/DL Creatinine 1.23 (H) 0.55 - 1.02 MG/DL  
 BUN/Creatinine ratio 30 (H) 12 - 20 GFR est AA 51 (L) >60 ml/min/1.73m2 GFR est non-AA 42 (L) >60 ml/min/1.73m2 Calcium 8.9 8.5 - 10.1 MG/DL  
CBC W/O DIFF Collection Time: 04/24/20  3:53 AM  
Result Value Ref Range WBC 13.5 (H) 3.6 - 11.0 K/uL  
 RBC 2.97 (L) 3.80 - 5.20 M/uL HGB 8.9 (L) 11.5 - 16.0 g/dL HCT 26.8 (L) 35.0 - 47.0 % MCV 90.2 80.0 - 99.0 FL  
 MCH 30.0 26.0 - 34.0 PG  
 MCHC 33.2 30.0 - 36.5 g/dL  
 RDW 19.8 (H) 11.5 - 14.5 % PLATELET 355 805 - 944 K/uL MPV 10.5 8.9 - 12.9 FL  
 NRBC 0.3 (H) 0  WBC ABSOLUTE NRBC 0.04 (H) 0.00 - 0.01 K/uL GLUCOSE, POC Collection Time: 04/24/20  7:21 AM  
Result Value Ref Range Glucose (POC) 131 (H) 65 - 100 mg/dL Performed by [address] [place] [address], POC Collection Time: 04/24/20 11:07 AM  
Result Value Ref Range Glucose (POC) 264 (H) 65 - 100 mg/dL Performed by [facility] Microbiology Data: 
 
Blood 4/23/20 pending Wound 4/23/20 Specimen Information: Wound Drainage  
    
Component Value Ref Range & Units Status Special Requests: CHEST   Preliminary GRAM STAIN RARE WBCS SEEN    Preliminary GRAM STAIN NO ORGANISMS SEEN    Preliminary Culture result: NO GROWTH AFTER 21 HOURS    Preliminary Result History Blood 4/21/20 pending Blood: 4/19/20 Blood   
    
Component Value Ref Range & Units Status Special Requests: NO SPECIAL REQUESTS    Final  
Culture result: Abnormal     Final  
KLEBSIELLA PNEUMONIAE GROWING IN ALL 4 BOTTLES DRAWN (SITES = RAC AND LAC) Susceptibility Klebsiella pneumoniae FARHAN Amikacin ($) <=2 ug/mL S Ampicillin ($) >=32 ug/mL R Ampicillin/sulbactam ($) 8 ug/mL S Cefazolin ($) <=4 ug/mL S Cefepime ($$) <=1 ug/mL S Cefoxitin <=4 ug/mL S Ceftazidime ($) <=1 ug/mL S Ceftriaxone ($) <=1 ug/mL S Ciprofloxacin ($) <=0.25 ug/mL S   
Ext. Spec. Beta Lactamase Negative ug/mL S Gentamicin ($) <=1 ug/mL S Levofloxacin ($) <=0.12 ug/mL S Meropenem ($$) <=0.25 ug/mL S Piperacillin/Tazobac ($) <=4 ug/mL S Tobramycin ($) <=1 ug/mL S Trimeth/Sulfa <=20 ug/mL S Urine: 4/19/20 Clean catch; Urine   
    
Component Value Ref Range & Units Status Special Requests: NO SPECIAL REQUESTS    Final  
Artie Count >100,000 COLONIES/mL    Final  
Culture result: KLEBSIELLA PNEUMONIAEAbnormal     Final  
Susceptibility Klebsiella pneumoniae FARHAN Amikacin ($) <=2 ug/mL S Ampicillin ($) >=32 ug/mL R Ampicillin/sulbactam ($) 8 ug/mL S Cefazolin ($) <=4 ug/mL S Cefepime ($$) <=1 ug/mL S Cefoxitin <=4 ug/mL S Ceftazidime ($) <=1 ug/mL S Ceftriaxone ($) <=1 ug/mL S Ciprofloxacin ($) <=0.25 ug/mL S   
Ext. Spec. Beta Lactamase Negative ug/mL S Gentamicin ($) <=1 ug/mL S Levofloxacin ($) <=0.12 ug/mL S Meropenem ($$) <=0.25 ug/mL S Nitrofurantoin 64 ug/mL I Piperacillin/Tazobac ($) <=4 ug/mL S Tobramycin ($) <=1 ug/mL S Trimeth/Sulfa <=20 ug/mL S   
 
 
 
12/28/19 BLOOD Component Value Ref Range & Units Status Special Requests: NO SPECIAL REQUESTS    Final  
Culture result: NO GROWTH 5 DAYS    Final  
Result History Urine 12/25/19 Urine   
    
 Component Value Ref Range & Units Status Special Requests: NO SPECIAL REQUESTS Reflexed from [id number]    Final  
[place] Count >100,000 COLONIES/mL    Final  
Culture result: ESCHERICHIA COLIAbnormal     Final  
Susceptibility Escherichia coli FARHAN Amikacin ($) <=16 ug/mL S Ampicillin ($) <=8 ug/mL S Ampicillin/sulbactam ($) <=8/4 ug/mL S Aztreonam ($$$$) <=4 ug/mL S Cefazolin ($) <=8 ug/mL S Cefepime ($$) <=4 ug/mL S Cefotaxime <=2 ug/mL S Ceftazidime ($) <=1 ug/mL S Ceftriaxone ($) <=1 ug/mL S Cefuroxime ($) <=4 ug/mL S Ciprofloxacin ($) <=1 ug/mL S Gentamicin ($) <=4 ug/mL S Imipenem <=1 ug/mL S Levofloxacin ($) <=2 ug/mL S Meropenem ($$) <=1 ug/mL S Nitrofurantoin <=32 ug/mL S Piperacillin/Tazobac ($) <=16 ug/mL S Tobramycin ($) <=4 ug/mL S Trimeth/Sulfa <=2/38 ug/mL S   
 
 
12/25/19 Blood Blood   
    
Component Value Ref Range & Units Status Special Requests: NO SPECIAL REQUESTS    Final  
Culture result: Abnormal     Final  
ESCHERICHIA COLI GROWING IN ALL 4 BOTTLES DRAWN (SITE=RAC AND L PORT) Culture result: Abnormal     Final  
PRELIMINARY REPORT OF GRAM NEGATIVE RODS GROWING IN 3 OF 4 BOTTLES DRAWN CALLED TO AND READ BACK BY [address] [place] [address] Street, RN ON 12/26/19 AT 9075. [address] Susceptibility Escherichia coli FARHAN Amikacin ($) <=16 ug/mL S Ampicillin ($) <=8 ug/mL S Ampicillin/sulbactam ($) <=8/4 ug/mL S Aztreonam ($$$$) <=4 ug/mL S Cefazolin ($) <=8 ug/mL S Cefepime ($$) <=4 ug/mL S Cefotaxime <=2 ug/mL S Ceftazidime ($) <=1 ug/mL S Ceftriaxone ($) <=1 ug/mL S Cefuroxime ($) <=4 ug/mL S Ciprofloxacin ($) <=1 ug/mL S Gentamicin ($) <=4 ug/mL S Imipenem <=1 ug/mL S Levofloxacin ($) <=2 ug/mL S Meropenem ($$) <=1 ug/mL S Piperacillin/Tazobac ($) <=16 ug/mL S Tobramycin ($) <=4 ug/mL S Trimeth/Sulfa <=2/38 ug/mL S   
 
 
6/20/18 6/21/2018 10:58 AM - [facility], [facility] [place]  
 
 Specimen Information: Blood  
    
Component Value Ref Range & Units Status Special Requests: NO SPECIAL REQUESTS    Final  
Culture result: Abnormal     Final  
STAPHYLOCOCCUS SPECIES, COAGULASE NEGATIVE GROWING IN 1 OF 4 BOTTLES DRAWN (SITE = R ARM) Culture result:    Final  
REMAINING BOTTLE(S) HAS/HAVE NO GROWTH IN 5 DAYS Imaging: TTE 4/20/20 Interpretation Summary · Normal cavity size and wall thickness. Mild-to-moderate systolic function. Estimated left ventricular ejection fraction is 40 - 45%. Abnormal left ventricular wall motion. Age-appropriate left ventricular diastolic function. · Moderately dilated left atrium. · Mildly dilated right atrium. · Color flow Doppler was used. · Aortic valve leaflet calcification present with reduced excursion. Aortic valve mean gradient is 17 mmHg. Aortic valve area is 1.4 cm2. Moderate aortic valve regurgitation is present. · Mitral valve non-specific thickening. Mild to moderate mitral valve regurgitation is present. · Mild to moderate tricuspid valve regurgitation is present. · Moderate pulmonary hypertension. Pulmonary arterial systolic pressure is 55 mmHg. · Small posterior pericardial effusion. Echo Findings Left Ventricle Normal cavity size and wall thickness. Mild-to-moderate systolic dysfunction. The estimated ejection fraction is 40 - 45%. LV wall motion is noted to be abnormal and apex. There is age-appropriate left ventricular diastolic function. Wall Scoring The following segments are hypokinetic: apical anterior, apical inferior and apex. Other segments could not be evaluated. Left Atrium Moderately dilated left atrium. Patent foramen ovale visualized with left to right shunting indicated by color flow Doppler. No closure device present. Right Ventricle Normal cavity size and global systolic function. Right Atrium Mildly dilated right atrium. Interatrial Septum There was a moderate left to right shunt, shown on color Doppler. Aortic Valve Trileaflet valve structure and no stenosis. There is leaflet calcification with reduced excursion. Aortic valve mean gradient is 17 mmHg. Aortic valve area is 1.4 cm2. Moderate aortic valve regurgitation. There is no vegetation on the aortic valve. Mitral Valve No stenosis. Mitral valve non-specific thickening. Mild to moderate regurgitation. There is no vegetation on the mitral valve. Tricuspid Valve Normal valve structure and no stenosis. Mild to moderate regurgitation. There is no vegetation on the tricuspid valve. Pulmonic Valve Pulmonic valve not well visualized. Aorta Normal aortic root. Pulmonary Artery Pulmonary arterial systolic pressure (PASP) is 55 mmHg. Pulmonary hypertension found to be moderate. Pericardium Small posterior pericardial effusion noted. No indications of tamponade present. Interpretation Summary · Normal cavity size and wall thickness. Mild-to-moderate systolic function. Estimated left ventricular ejection fraction is 40 - 45%. Abnormal left ventricular wall motion. Age-appropriate left ventricular diastolic function. · Moderately dilated left atrium. · Mildly dilated right atrium. · Color flow Doppler was used. · Aortic valve leaflet calcification present with reduced excursion. Aortic valve mean gradient is 17 mmHg. Aortic valve area is 1.4 cm2. Moderate aortic valve regurgitation is present. · Mitral valve non-specific thickening. Mild to moderate mitral valve regurgitation is present. · Mild to moderate tricuspid valve regurgitation is present. · Moderate pulmonary hypertension. Pulmonary arterial systolic pressure is 55 mmHg. · Small posterior pericardial effusion. CXR 4/19/20 PA and lateral views demonstrate normal heart size. Persistent mild diffuse 
interstitial prominence again noted. Osseous metastatic disease is unchanged. Port-A-Cath is able in position. Calcified granuloma again noted in the right 
apex. Old left rib fractures. 
  
IMPRESSION Impression: Persistent mild diffuse interstitial prominence again noted and 
grossly unchanged compared to 12/31/2019. Stable osseous metastatic disease. Assessment / Plan:  
 
Ms Ana Charles Is a 31-year-old lady with a history of metastatic breast cancer with an indwelling port status post chemotherapy about a week ago, rheumatoid arthritis, coronary artery disease, osteoporosis, systolic heart failure, colon cancer, diabetes, E. coli bacteremia in December 2019 who presents with fatigue, fevers, feeling cold and nausea. During this admission, noted to have a T-max of 102.9 and initially hypotensive with a systolic blood pressure in the 80s. She was started on ceftriaxone IV. Labs show uptrending leukocytosis with neutrophilic predominance. She also has some lymphocytopenia. Her chest x-ray has been read as \" persistent mild diffuse interstitial prominence again noted and grossly unchanged compared to 12/31/2019. Stable osseous metastatic disease. \" Her blood cultures dated 4/19/2020 is positive for gram-negative arturo in all 4 bottles obtained from right AC and left AC. Urine culture from 4/19/2020 also has been in the 100,000 gram-negative rods. Her urine analysis is positive for large leukocyte Estrace, negative nitrates, 2+ bacteria and greater than 100 white count. Of note, she had E. coli grew out of blood cultures dated 12/25/19 and the site has been indicated as  \"Right AC and left port \". Repeat blood culture on 12/28/2020 was negative. A transthoracic echo from 12/27/2019 was read as aortic valve sclerosis moderate regurgitation of the mitral valve. Tricuspid and pulmonic valves were read as normal valve structure.   It appears that she was treated with IV antibiotics initially and then transition to oral Cipro to complete a 14-day course of antibiotic therapy to be stopped on 1/7/2020. 
 
 
1) Klebsiella bacteremia in the setting of chemotherapy a week ago with an indwelling port Of note she had E. coli growing in her blood cultures in December 2019. This was reported to be right before meals and left port as site. Her port however is on the right chest area Recommendations S/P port removal 4/23/20 IV cefepime renally dosed by pharmacy, currently on 2gm Q 24 hours I discussed with pharmacy and her creatinine clearance is close to 30 and if it improves I am concerned that every 24 hour dosing may not be sufficient given the invasive bacteremia she had While she is hospitalized, we can continue to watch her renal function closely and adjust her antibiotics accordingly daily However if plans for discharge,  would be concerned about close monitoring of blood work at home to dose cefepime appropriately We will therefore plan for IV Ertapenem on discharge to home or to the infusion center if antibiotics cannot be safely given at home I have not changed her antibiotics to Ertapenem at this time as it is a restricted/nonformulary medication and cannot be given while in the hospital daily. Would recommend she gets a test dose to ensure she tolerates it prior to discharge, on the day of discharge. Ertapenem dosing is 1gm Q 24 hours as far as Cr CL > 30. At this time, awaiting  her blood cultures from 4/21 and 4/23/20 Would recommend at least waiting 24 to 48 hours after blood cx from 4/23/20 are negative before any line placement for IV antibiotics Antibiotic side effects potential adverse effects including the ability to lower seizure threshold, risk for C. difficile infection,, renal, hematological and GI issues were discussed 2) metastatic breast cancer Reviewed her last oncology note from 1/6/2020.  \" Has had nice response to chemo (abraxane/xeloda) with recent documented decrease in hepatic and axillary involvement. .. her last rx was 12/16 (cycle 5 ) she received 7 days of xeloda ending on 12/22. Johan Founds than neulasta on 12/23\" 3) coronary artery disease, systolic dysfunction Plans per cardiology Noted she is on Plavix 4) rheumatoid arthritis We will need to clarify if she is getting a DMARD or any immunosuppressive medication which increases the risk for infections 5) marked leukocytosis likely in the setting of bacteremia Continue to monitor as high risk for C. difficile infection as well which can present with marked leukocytosis 6) history of colon cancer per chart 7) osteoporosis per chart 8) DVT prophylaxis per primary team, noted she is on Plavix Discussed above with the  today Thank for the opportunity to participate in the care of this patient. Please contact with questions or concerns.   
 
Melvin Cortés,  
1:37 PM

## 2020-04-24 NOTE — PROGRESS NOTES
Transition of Care Plan ? Disposition: Home with IV abx vs [facility] follow up; awaiting final ID order/recommendation ? If ID recommends her to go to [facility], order is at the bedside chart; if recommended home with IV abx consult CM at [phone]- referral has been made to [facility] (faxed at [phone]) This author received hand off from unit CM, she was unable to send referral via all scripts as it didn't upload the attachment. This writer called and spoke with Micha Maharaj [phone] on call staff; she advised to fax documents at [phone], faxed. Weekend CM will follow up appropriately once the ID makes final recommendation.   
 
 
JOSE Lin

## 2020-04-24 NOTE — PROGRESS NOTES
Hospitalist Progress Note [facility] Answering service: [phone] OR [phone] from in house phone Date of Service:  2020 NAME:  Rafi Parish :  1935 MRN:  [id number] Admission Summary:  
85F p/w UTI and sepsis/Bacteremia Interval history / Subjective:  
Patient seen and examined. Feels fine. Does have temperature 100.3 taken while in the room. Will monitor. Appreciate ID and oncology. Assessment & Plan:  
 
Sepsis secondary to Klebsiella bacteremia, UTI:  
-Presented with fever, leukocytosis. Lactic acid WNL 
-Appreciate infectious disease 
-Continues on cefepime. Plan to transition to Ertapenem on discharge. Will need 1 dose prior to discharge to ensure she can tolerate.  
-Indwelling port removed  
-Repeat blood cultures . Follow  cultures. Mild renal insufficiency: on CKD. Monitor, avoid nephrotoxins HypoKalemia: Acute, replace, monitor HypoNatremia: mild, monitor CAD: stable, home regimen, monitor Systolic CHF: chronic, no acute decompensation. Metastatic breast Ca: on chemo, recent Neulasta given, Oncology following Protein- calorie malnutrition: Severe, POA- poor appetite, nutritional cs, supplements, monitor Code status: Full DVT prophylaxis: SCD Care Plan discussed with: Patient/Family and Nurse Disposition: 24-48 hours Hospital Problems  Date Reviewed: 2020 Codes Class Noted POA Bacteremia due to Gram-negative bacteria ICD-10-CM: R78.81 ICD-9-CM: 790.7, 041.85  2020 Yes  
   
 UTI (urinary tract infection) ICD-10-CM: N39.0 ICD-9-CM: 599.0  2020 Yes Systemic inflammatory response syndrome (HCC) ICD-10-CM: R65.10 ICD-9-CM: 995.90  2020 Yes Review of Systems:  
Pertinent items are mentioned in interval history. Vital Signs:  
 Last 24hrs VS reviewed since prior progress note. Most recent are: 
Visit Vitals /75 (BP 1 Location: Left arm, BP Patient Position: At rest) Pulse (!) 103 Temp 100.3 °F (37.9 °C) Resp 17 Ht 5' 1\" (1.549 m) Wt 63.3 kg (139 lb 8.8 oz) SpO2 98% BMI 26.37 kg/m² No intake or output data in the 24 hours ending 04/24/20 1504 Physical Examination:  
General:  Alert, oriented, No acute distress, pleasant lady Lung: CTAB Chest: right port removal sight without erythema, swelling Abd:  Soft, non-tender, non-distended Extremities:  No cyanosis or clubbing, no significant edema Neuro:  Grossly normal, no focal neuro deficits, follows commands Psych:  Good insight Data Review:  
 Review and/or order of clinical lab test 
Review and/or order of tests in the radiology section of CPT Review and/or order of tests in the medicine section of CPT Labs:  
 
Recent Labs  
  04/24/20 
0353 04/22/20 
0249 WBC 13.5* 18.2* HGB 8.9* 9.0*  
HCT 26.8* 26.9*  
 177 Recent Labs  
  04/24/20 
0353 04/22/20 
0249  135* K 3.9 4.2  106 CO2 23 22 BUN 37* 41* CREA 1.23* 1.42* * 187* CA 8.9 8.4* No results for input(s): SGOT, GPT, ALT, AP, TBIL, TBILI, TP, ALB, GLOB, GGT, AML, LPSE in the last 72 hours. No lab exists for component: AMYP, HLPSE No results for input(s): INR, PTP, APTT, INREXT, INREXT in the last 72 hours. No results for input(s): FE, TIBC, PSAT, FERR in the last 72 hours. No results found for: FOL, RBCF No results for input(s): PH, PCO2, PO2 in the last 72 hours. No results for input(s): CPK, CKNDX, TROIQ in the last 72 hours. No lab exists for component: CPKMB Lab Results Component Value Date/Time Cholesterol, total 74 04/16/2018 04:21 AM  
 HDL Cholesterol 25 04/16/2018 04:21 AM  
 LDL, calculated 31.6 04/16/2018 04:21 AM  
 Triglyceride 87 04/16/2018 04:21 AM  
 CHOL/HDL Ratio 3.0 04/16/2018 04:21 AM  
 
Lab Results Component Value Date/Time  Glucose (POC) 264 (H) 04/24/2020 11:07 AM  
 Glucose (POC) 131 (H) 04/24/2020 07:21 AM  
 Glucose (POC) 208 (H) 04/23/2020 09:35 PM  
 Glucose (POC) 197 (H) 04/23/2020 04:23 PM  
 Glucose (POC) 141 (H) 04/23/2020 11:28 AM  
 
Lab Results Component Value Date/Time Color YELLOW/STRAW 04/19/2020 11:12 AM  
 Appearance TURBID (A) 04/19/2020 11:12 AM  
 Specific gravity 1.009 04/19/2020 11:12 AM  
 pH (UA) 5.5 04/19/2020 11:12 AM  
 Protein 30 (A) 04/19/2020 11:12 AM  
 Glucose Negative 04/19/2020 11:12 AM  
 Ketone Negative 04/19/2020 11:12 AM  
 Bilirubin Negative 04/19/2020 11:12 AM  
 Urobilinogen 0.2 04/19/2020 11:12 AM  
 Nitrites Negative 04/19/2020 11:12 AM  
 Leukocyte Esterase LARGE (A) 04/19/2020 11:12 AM  
 Epithelial cells FEW 04/19/2020 11:12 AM  
 Bacteria 2+ (A) 04/19/2020 11:12 AM  
 WBC >100 (H) 04/19/2020 11:12 AM  
 RBC 5-10 04/19/2020 11:12 AM  
 
Medications Reviewed:  
 
Current Facility-Administered Medications Medication Dose Route Frequency  HYDROcodone-acetaminophen (NORCO) 5-325 mg per tablet 1 Tab  1 Tab Oral Q4H PRN  
 bisacodyL (DULCOLAX) tablet 5 mg  5 mg Oral DAILY PRN  
 bisacodyL (DULCOLAX) suppository 10 mg  10 mg Rectal DAILY PRN  
 magnesium hydroxide (MILK OF MAGNESIA) 400 mg/5 mL oral suspension 30 mL  30 mL Oral DAILY PRN  
 senna-docusate (PERICOLACE) 8.6-50 mg per tablet 1 Tab  1 Tab Oral DAILY  calcium carbonate (TUMS) chewable tablet 200 mg [elemental]  200 mg Oral TID PRN  
 cefepime (MAXIPIME) 2 g in 0.9% sodium chloride (MBP/ADV) 100 mL  2 g IntraVENous Q24H  
 epoetin celio-epbx (RETACRIT) injection 10,000 Units  10,000 Units SubCUTAneous Q MON, WED & FRI  sodium chloride (NS) flush 5-10 mL  5-10 mL IntraVENous PRN  
 aspirin delayed-release tablet 81 mg  81 mg Oral DAILY  clopidogreL (PLAVIX) tablet 75 mg  75 mg Oral DAILY  levothyroxine (SYNTHROID) tablet 88 mcg  88 mcg Oral ACB  polyethylene glycol (MIRALAX) packet 17 g  17 g Oral DAILY  glucose chewable tablet 16 g  4 Tab Oral PRN  
 glucagon (GLUCAGEN) injection 1 mg  1 mg IntraMUSCular PRN  
 sodium chloride (NS) flush 5-40 mL  5-40 mL IntraVENous Q8H  
 sodium chloride (NS) flush 5-40 mL  5-40 mL IntraVENous PRN  
 naloxone (NARCAN) injection 0.4 mg  0.4 mg IntraVENous PRN  
 dextrose 10% infusion 0-250 mL  0-250 mL IntraVENous PRN  
 insulin glargine (LANTUS) injection 14 Units  14 Units SubCUTAneous QHS  insulin lispro (HUMALOG) injection   SubCUTAneous AC&HS  ondansetron (ZOFRAN) injection 4 mg  4 mg IntraVENous Q4H PRN  
 acetaminophen (TYLENOL) tablet 650 mg  650 mg Oral Q4H PRN  
 furosemide (LASIX) injection 20 mg  20 mg IntraVENous DAILY  
______________________________________________________________________ EXPECTED LENGTH OF STAY: 3d 16h ACTUAL LENGTH OF STAY:          5 [facility] [facility]

## 2020-04-25 NOTE — PROGRESS NOTES
Problem: Falls - Risk of 
Goal: *Absence of Falls Description: Document Aye Minium Fall Risk and appropriate interventions in the flowsheet. Outcome: Progressing Towards Goal 
Note: Fall Risk Interventions: 
Mobility Interventions: Communicate number of staff needed for ambulation/transfer Medication Interventions: Evaluate medications/consider consulting pharmacy Elimination Interventions: Call light in reach

## 2020-04-25 NOTE — PROGRESS NOTES
ID follow up remote note From ID standpoint, needs IV antibiotics set up at home or at infusion center Infusion center form was already filled and given to CM yesterday If plans for DC , please given a dose of Ertapenem to ensure she tolerates before DC Currently on Cefepime IV. I have not changed antibiotics to Ertapenem as cannot given daily due to non form medication and please order on day of DC. On DC needs the following IV Ertapenem orders for home also written  In case plan is for IV antibiotics at home and not at infusion center IV Ertapenem 1gm daily ( to be adjusted renally by pharmacy ) till 5/7/20 PICC/line for antibiotics per primary team 
Removal once IV antibiotics completed Check weekly labs for CBC wt diff, CMP on Mondays and fax to Dr Itzel Puentes at [phone] F/U with me  arranged ( virtual visit ) on 5/1/ 20 at 11 am.  
 
 
Shereen Patterson,  
1:43 PM

## 2020-04-25 NOTE — PROGRESS NOTES
Hospitalist Progress Note Lou Childress MD 
Answering service: [phone] OR [phone] from in house phone Date of Service:  2020 NAME:  Nina Aldrich :  1935 MRN:  [id number] Admission Summary: A 82 F p/w UTI and sepsis/Bacteremia Interval history / Subjective: She said she feels better, she has constipation and took milk of magnesia Assessment & Plan:  
 
Sepsis secondary to Klebsiella bacteremia, UTI:  
-Presented with fever, leukocytosis. Lactic acid WNL 
-Appreciate infectious disease 
-Continues on cefepime. Plan to transition to Ertapenem on discharge. Will need 1 dose prior to discharge to ensure she can tolerate.  
-Indwelling port removed  
-Repeat blood cultures , blood cx on  cultures no growth so far Mild renal insufficiency on CKD stage III. -creatinine stable,  
- avoid nephrotoxins -monitor renal function Acute hypoKalemia 
-replaced, and resolved Mild HypoNatremia 
-resolved, monitor Hx of CAD 
- stable, on aspirin and plavix Chronic Systolic CHF 
-no acute decompensation. 
-continue lasix 
-coreg is held due to hypotension, not on ACEi/ARB due to renal insufficiency Metastatic breast Ca 
-on chemo, recent Neulasta given,  
-Oncology following Protein- calorie malnutrition: Severe, POA 
- poor appetite, nutritional cs, supplements, monitor Code status: Full DVT prophylaxis: SCD Care Plan discussed with: Patient/Family and Nurse Disposition: 24-48 hours I call and updated her sonLuly including discharge plan, questions answered Hospital Problems  Date Reviewed: 2020 Codes Class Noted POA Bacteremia due to Gram-negative bacteria ICD-10-CM: R78.81 ICD-9-CM: 790.7, 041.85  2020 Yes  
   
 UTI (urinary tract infection) ICD-10-CM: N39.0 ICD-9-CM: 599.0  2020 Yes Systemic inflammatory response syndrome (HCC) ICD-10-CM: R65.10 ICD-9-CM: 995.90  4/19/2020 Yes Review of Systems:  
Pertinent items are mentioned in interval history. Vital Signs:  
 Last 24hrs VS reviewed since prior progress note. Most recent are: 
Visit Vitals /71 (BP 1 Location: Right arm, BP Patient Position: At rest;Supine) Pulse 99 Temp 99.6 °F (37.6 °C) Resp 22 Ht 5' 1\" (1.549 m) Wt 58.9 kg (129 lb 14.4 oz) SpO2 97% BMI 24.54 kg/m² Intake/Output Summary (Last 24 hours) at 4/25/2020 1422 Last data filed at 4/25/2020 0126 Gross per 24 hour Intake 302.3 ml Output  Net 302.3 ml Physical Examination:  
General:  Alert, oriented, No acute distress, pleasant lady Lung: CTAB Chest: right port removal sight without erythema, swelling Abd:  Soft, non-tender, non-distended Extremities:  No cyanosis or clubbing, no significant edema Neuro:  Grossly normal, no focal neuro deficits, follows commands Psych:  Good insight Data Review:  
 Review and/or order of clinical lab test 
Review and/or order of tests in the radiology section of CPT Review and/or order of tests in the medicine section of CPT Labs:  
 
Recent Labs  
  04/24/20 
4069 WBC 13.5* HGB 8.9* HCT 26.8*  
 Recent Labs  
  04/24/20 
0866   
K 3.9  CO2 23 BUN 37* CREA 1.23* * CA 8.9 No results for input(s): SGOT, GPT, ALT, AP, TBIL, TBILI, TP, ALB, GLOB, GGT, AML, LPSE in the last 72 hours. No lab exists for component: AMYP, HLPSE No results for input(s): INR, PTP, APTT, INREXT, INREXT in the last 72 hours. No results for input(s): FE, TIBC, PSAT, FERR in the last 72 hours. No results found for: FOL, RBCF No results for input(s): PH, PCO2, PO2 in the last 72 hours. No results for input(s): CPK, CKNDX, TROIQ in the last 72 hours. No lab exists for component: CPKMB Lab Results Component Value Date/Time Cholesterol, total 74 04/16/2018 04:21 AM  
 HDL Cholesterol 25 04/16/2018 04:21 AM  
 LDL, calculated 31.6 04/16/2018 04:21 AM  
 Triglyceride 87 04/16/2018 04:21 AM  
 CHOL/HDL Ratio 3.0 04/16/2018 04:21 AM  
 
Lab Results Component Value Date/Time Glucose (POC) 203 (H) 04/25/2020 11:00 AM  
 Glucose (POC) 172 (H) 04/25/2020 07:10 AM  
 Glucose (POC) 228 (H) 04/24/2020 09:08 PM  
 Glucose (POC) 158 (H) 04/24/2020 04:09 PM  
 Glucose (POC) 264 (H) 04/24/2020 11:07 AM  
 
Lab Results Component Value Date/Time Color YELLOW/STRAW 04/19/2020 11:12 AM  
 Appearance TURBID (A) 04/19/2020 11:12 AM  
 Specific gravity 1.009 04/19/2020 11:12 AM  
 pH (UA) 5.5 04/19/2020 11:12 AM  
 Protein 30 (A) 04/19/2020 11:12 AM  
 Glucose Negative 04/19/2020 11:12 AM  
 Ketone Negative 04/19/2020 11:12 AM  
 Bilirubin Negative 04/19/2020 11:12 AM  
 Urobilinogen 0.2 04/19/2020 11:12 AM  
 Nitrites Negative 04/19/2020 11:12 AM  
 Leukocyte Esterase LARGE (A) 04/19/2020 11:12 AM  
 Epithelial cells FEW 04/19/2020 11:12 AM  
 Bacteria 2+ (A) 04/19/2020 11:12 AM  
 WBC >100 (H) 04/19/2020 11:12 AM  
 RBC 5-10 04/19/2020 11:12 AM  
 
Medications Reviewed:  
 
Current Facility-Administered Medications Medication Dose Route Frequency  HYDROcodone-acetaminophen (NORCO) 5-325 mg per tablet 1 Tab  1 Tab Oral Q4H PRN  
 bisacodyL (DULCOLAX) tablet 5 mg  5 mg Oral DAILY PRN  
 bisacodyL (DULCOLAX) suppository 10 mg  10 mg Rectal DAILY PRN  
 magnesium hydroxide (MILK OF MAGNESIA) 400 mg/5 mL oral suspension 30 mL  30 mL Oral DAILY PRN  
 senna-docusate (PERICOLACE) 8.6-50 mg per tablet 1 Tab  1 Tab Oral DAILY  calcium carbonate (TUMS) chewable tablet 200 mg [elemental]  200 mg Oral TID PRN  
 cefepime (MAXIPIME) 2 g in 0.9% sodium chloride (MBP/ADV) 100 mL  2 g IntraVENous Q24H  
 epoetin celio-epbx (RETACRIT) injection 10,000 Units  10,000 Units SubCUTAneous Q MON, WED & FRI  
  sodium chloride (NS) flush 5-10 mL  5-10 mL IntraVENous PRN  
 aspirin delayed-release tablet 81 mg  81 mg Oral DAILY  clopidogreL (PLAVIX) tablet 75 mg  75 mg Oral DAILY  levothyroxine (SYNTHROID) tablet 88 mcg  88 mcg Oral ACB  polyethylene glycol (MIRALAX) packet 17 g  17 g Oral DAILY  glucose chewable tablet 16 g  4 Tab Oral PRN  
 glucagon (GLUCAGEN) injection 1 mg  1 mg IntraMUSCular PRN  
 sodium chloride (NS) flush 5-40 mL  5-40 mL IntraVENous Q8H  
 sodium chloride (NS) flush 5-40 mL  5-40 mL IntraVENous PRN  
 naloxone (NARCAN) injection 0.4 mg  0.4 mg IntraVENous PRN  
 dextrose 10% infusion 0-250 mL  0-250 mL IntraVENous PRN  
 insulin glargine (LANTUS) injection 14 Units  14 Units SubCUTAneous QHS  insulin lispro (HUMALOG) injection   SubCUTAneous AC&HS  ondansetron (ZOFRAN) injection 4 mg  4 mg IntraVENous Q4H PRN  
 acetaminophen (TYLENOL) tablet 650 mg  650 mg Oral Q4H PRN  
 furosemide (LASIX) injection 20 mg  20 mg IntraVENous DAILY  
______________________________________________________________________ EXPECTED LENGTH OF STAY: 3d 16h ACTUAL LENGTH OF STAY:          6 Ana Rutledge MD

## 2020-04-25 NOTE — PROGRESS NOTES
Care Management Transition of Care Plan: 
· Home with IV antibiotics with Francoise (Await return call regarding insurance.) · [address] (Pending authorization) · Son-Robin will transport patient home. · Patient will need PICC prior to discharge. Received call from Dr. Kelsey Oliva. Patient can be discharged tomorrow if outpatient IV antibiotics can be arranged. If not, plan for discharge on Monday. He will order PICC to be placed on the day of discharge. Called [facility] ([phone]) and spoke with Marianela ([phone]). Gave her the updated information for insurance. She will run the insurance in the morning and call this CM back. Likely patient will not have a co-pay. She asked CM to obtain home health nursing for patient. Spoke with patient in the room. Explained she would be eligible for either home health or [facility]. Patient asked CM to call her son- Tangela Dominguez. Called patient's son-Robin ([phone]). Explained option of home IV verses OPIC. Since patient is an oncology patient, he would prefer for her to received antibiotics at home. Offered freedom of choice for home health for nursing. He selected [facility] - INPAT[facility]. Explained patient will need to receive her antibiotic in the morning and could be discharged after that as long as Francoise is able to receive and answer on the insurance coverage. He will plan to pick her up around 2 PM. Update Dr. Kelsey Oliva. He will order PICC. JOSE Solorzano

## 2020-04-25 NOTE — PROGRESS NOTES
Hematology-Oncology Progress Note Sandy Ledesma 
1935 
[id number] 
4/25/2020 Subjective:  
 
Still with constipation. S/p port removal. Denies fever. No new symptoms. Allergies: Statins-hmg-coa reductase inhibitors and Sulfa (sulfonamide antibiotics) Current Facility-Administered Medications Medication Dose Route Frequency Provider Last Rate Last Dose  
 HYDROcodone-acetaminophen (NORCO) 5-325 mg per tablet 1 Tab  1 Tab Oral Q4H PRN Toshia Kumar MD      
 bisacodyL (DULCOLAX) tablet 5 mg  5 mg Oral DAILY PRN Ingrid Paulie M, DO      
 bisacodyL (DULCOLAX) suppository 10 mg  10 mg Rectal DAILY PRN Ingrid Apodaca M, DO   10 mg at 04/23/20 1846  
 magnesium hydroxide (MILK OF MAGNESIA) 400 mg/5 mL oral suspension 30 mL  30 mL Oral DAILY PRN Ingrid GUIDO, DO   30 mL at 04/25/20 1118  
 senna-docusate (PERICOLACE) 8.6-50 mg per tablet 1 Tab  1 Tab Oral DAILY Toshia Kumar MD   1 Tab at 04/24/20 8876  calcium carbonate (TUMS) chewable tablet 200 mg [elemental]  200 mg Oral TID PRN Toshia Kumar MD   200 mg at 04/22/20 2356  cefepime (MAXIPIME) 2 g in 0.9% sodium chloride (MBP/ADV) 100 mL  2 g IntraVENous Q24H Toshia Kumar  mL/hr at 04/25/20 1054 2 g at 04/25/20 1054  epoetin celio-epbx (RETACRIT) injection 10,000 Units  10,000 Units SubCUTAneous Q MON, WED & Lara Vega MD   10,000 Units at 04/24/20 2110  
 sodium chloride (NS) flush 5-10 mL  5-10 mL IntraVENous PRN Toshia Kumar MD      
 aspirin delayed-release tablet 81 mg  81 mg Oral DAILY Toshia Kumar MD   81 mg at 04/25/20 0572  clopidogreL (PLAVIX) tablet 75 mg  75 mg Oral DAILY Toshia Kumar MD   75 mg at 04/25/20 9936  levothyroxine (SYNTHROID) tablet 88 mcg  88 mcg Oral ACB Toshia Kumar MD   88 mcg at 04/25/20 1690  polyethylene glycol (MIRALAX) packet 17 g  17 g Oral DAILY Toshia Kumar MD   17 g at 04/23/20 7097  glucose chewable tablet 16 g  4 Tab Oral PRN Justin Verdugo MD      
 glucagon (GLUCAGEN) injection 1 mg  1 mg IntraMUSCular PRN Justin Verdugo MD      
 sodium chloride (NS) flush 5-40 mL  5-40 mL IntraVENous Q8H Justin Verdugo MD   10 mL at 04/25/20 8444  sodium chloride (NS) flush 5-40 mL  5-40 mL IntraVENous PRN Justin Verdugo MD      
 naloxone [facility]) injection 0.4 mg  0.4 mg IntraVENous PRN Justin Verdugo MD      
 dextrose 10% infusion 0-250 mL  0-250 mL IntraVENous PRN Justin Verdugo MD      
 insulin glargine (LANTUS) injection 14 Units  14 Units SubCUTAneous QHS Justin Verdugo MD   14 Units at 04/24/20 2116  
 insulin lispro (HUMALOG) injection   SubCUTAneous AC&HS Justin Verdugo MD   3 Units at 04/25/20 1117  
 ondansetron (ZOFRAN) injection 4 mg  4 mg IntraVENous Q4H PRN Justin Verdugo MD   4 mg at 04/20/20 6520  acetaminophen (TYLENOL) tablet 650 mg  650 mg Oral Q4H PRN Justin Verdugo MD   650 mg at 04/24/20 1442  furosemide (LASIX) injection 20 mg  20 mg IntraVENous DAILY Justin Verdugo MD   20 mg at 04/25/20 4407 Objective:  
 
Patient Vitals for the past 24 hrs: 
 BP Temp Pulse Resp SpO2  
04/25/20 0807 117/48 98.7 °F (37.1 °C) 99 17 96 % 04/25/20 0314 136/71 98.7 °F (37.1 °C) (!) 116 18 98 % 04/24/20 1925 102/62 98.2 °F (36.8 °C) 95 18 97 % 04/24/20 1626  97.9 °F (36.6 °C)     
04/24/20 1422 138/75 100.3 °F (37.9 °C) (!) 103 17 98 % Gen: NAD HEENT: PERRL, Sclerae anicteric Cv: right port removal site C, D, and I Pulm: CTA bilaterally Abd: NABS, NTND, No HSM Ext: No c/c/e Available labs reviewed: 
Labs:   
Recent Results (from the past 24 hour(s)) GLUCOSE, POC Collection Time: 04/24/20  4:09 PM  
Result Value Ref Range Glucose (POC) 158 (H) 65 - 100 mg/dL Performed by Ani Bruner GLUCOSE, POC Collection Time: 04/24/20  9:08 PM  
Result Value Ref Range Glucose (POC) 228 (H) 65 - 100 mg/dL Performed by [employer] Sera GLUCOSE, POC Collection Time: 04/25/20  7:10 AM  
Result Value Ref Range Glucose (POC) 172 (H) 65 - 100 mg/dL Performed by Selina Sera GLUCOSE, POC Collection Time: 04/25/20 11:00 AM  
Result Value Ref Range Glucose (POC) 203 (H) 65 - 100 mg/dL Performed by Ronald Gregorio Assessment and Plan  
 
81 y/o woman with metastatic breast CA, admitted with K. Pneumonia UTI/Sepsis. ID recommended port removal.  
 
1. Breast Ca: continued management as an outpatient. 2. ID: K. Pneumonia sepsis: Appreciate Hospitalist/ID care. Plans in place for discharge on antibiotics. 3. Anemia: check CBC in AM 
 
4. Disposition: okay from our standpoint for discharge once cleared by primary service/ID Thank you for allowing us to participate in the care of this very pleasant patient. Guzman Mcrae MD 
Hematology/Oncology Phone [phone]

## 2020-04-25 NOTE — PROGRESS NOTES
Problem: Falls - Risk of 
Goal: *Absence of Falls Description: Document Dick Zuleta Fall Risk and appropriate interventions in the flowsheet. Outcome: Progressing Towards Goal 
Note: Fall Risk Interventions: 
Mobility Interventions: Communicate number of staff needed for ambulation/transfer, Patient to call before getting OOB Medication Interventions: Evaluate medications/consider consulting pharmacy, Patient to call before getting OOB Elimination Interventions: Call light in reach, Patient to call for help with toileting needs Problem: Pressure Injury - Risk of 
Goal: *Prevention of pressure injury Description: Document Calderon Scale and appropriate interventions in the flowsheet. Outcome: Progressing Towards Goal 
Note: Pressure Injury Interventions: 
Sensory Interventions: Assess changes in LOC, Float heels, Keep linens dry and wrinkle-free Moisture Interventions: Absorbent underpads, Check for incontinence Q2 hours and as needed Activity Interventions: Increase time out of bed, Pressure redistribution bed/mattress(bed type) Mobility Interventions: Float heels, HOB 30 degrees or less, Pressure redistribution bed/mattress (bed type) Nutrition Interventions: Document food/fluid/supplement intake Friction and Shear Interventions: HOB 30 degrees or less, Lift sheet, Minimize layers Problem: Patient Education: Go to Patient Education Activity Goal: Patient/Family Education Outcome: Progressing Towards Goal 
  
Problem: Nutrition Deficit Goal: *Optimize nutritional status Outcome: Progressing Towards Goal 
  
Problem: Patient Education: Go to Patient Education Activity Goal: Patient/Family Education Outcome: Progressing Towards Goal 
  
Problem: Patient Education: Go to Patient Education Activity Goal: Patient/Family Education Outcome: Progressing Towards Goal

## 2020-04-26 NOTE — PROGRESS NOTES
Problem: Falls - Risk of 
Goal: *Absence of Falls Description: Document Charli Gomez Fall Risk and appropriate interventions in the flowsheet. Outcome: Progressing Towards Goal 
Note: Fall Risk Interventions: 
Mobility Interventions: Communicate number of staff needed for ambulation/transfer Medication Interventions: Patient to call before getting OOB Elimination Interventions: Call light in reach Problem: Patient Education: Go to Patient Education Activity Goal: Patient/Family Education Outcome: Progressing Towards Goal 
  
Problem: Pressure Injury - Risk of 
Goal: *Prevention of pressure injury Description: Document Calderon Scale and appropriate interventions in the flowsheet. Outcome: Progressing Towards Goal 
Note: Pressure Injury Interventions: 
Sensory Interventions: Assess changes in LOC Moisture Interventions: Absorbent underpads Activity Interventions: Increase time out of bed, Pressure redistribution bed/mattress(bed type) Mobility Interventions: Pressure redistribution bed/mattress (bed type) Nutrition Interventions: Document food/fluid/supplement intake Friction and Shear Interventions: Apply protective barrier, creams and emollients Problem: Patient Education: Go to Patient Education Activity Goal: Patient/Family Education Outcome: Progressing Towards Goal 
  
Problem: Heart Failure: Day 1 Goal: Off Pathway (Use only if patient is Off Pathway) Outcome: Progressing Towards Goal 
Goal: Activity/Safety Outcome: Progressing Towards Goal 
Goal: Consults, if ordered Outcome: Progressing Towards Goal 
Goal: Diagnostic Test/Procedures Outcome: Progressing Towards Goal 
Goal: Nutrition/Diet Outcome: Progressing Towards Goal 
Goal: Discharge Planning Outcome: Progressing Towards Goal 
Goal: Medications Outcome: Progressing Towards Goal 
Goal: Respiratory Outcome: Progressing Towards Goal 
Goal: Treatments/Interventions/Procedures Outcome: Progressing Towards Goal 
Goal: Psychosocial 
Outcome: Progressing Towards Goal 
Goal: *Oxygen saturation within defined limits Outcome: Progressing Towards Goal 
Goal: *Hemodynamically stable Outcome: Progressing Towards Goal 
Goal: *Optimal pain control at patient's stated goal 
Outcome: Progressing Towards Goal 
Goal: *Anxiety reduced or absent Outcome: Progressing Towards Goal 
  
Problem: Heart Failure: Day 2 Goal: Off Pathway (Use only if patient is Off Pathway) Outcome: Progressing Towards Goal 
Goal: Activity/Safety Outcome: Progressing Towards Goal 
Goal: Consults, if ordered Outcome: Progressing Towards Goal 
Goal: Diagnostic Test/Procedures Outcome: Progressing Towards Goal 
Goal: Nutrition/Diet Outcome: Progressing Towards Goal 
Goal: Discharge Planning Outcome: Progressing Towards Goal 
Goal: Medications Outcome: Progressing Towards Goal 
Goal: Respiratory Outcome: Progressing Towards Goal 
Goal: Treatments/Interventions/Procedures Outcome: Progressing Towards Goal 
Goal: Psychosocial 
Outcome: Progressing Towards Goal 
Goal: *Oxygen saturation within defined limits Outcome: Progressing Towards Goal 
Goal: *Hemodynamically stable Outcome: Progressing Towards Goal 
Goal: *Optimal pain control at patient's stated goal 
Outcome: Progressing Towards Goal 
Goal: *Anxiety reduced or absent Outcome: Progressing Towards Goal 
Goal: *Demonstrates progressive activity Outcome: Progressing Towards Goal 
  
Problem: Heart Failure: Day 3 Goal: Off Pathway (Use only if patient is Off Pathway) Outcome: Progressing Towards Goal 
Goal: Activity/Safety Outcome: Progressing Towards Goal 
Goal: Diagnostic Test/Procedures Outcome: Progressing Towards Goal 
Goal: Nutrition/Diet Outcome: Progressing Towards Goal 
Goal: Discharge Planning Outcome: Progressing Towards Goal 
Goal: Medications Outcome: Progressing Towards Goal 
Goal: Respiratory Outcome: Progressing Towards Goal 
 Goal: Treatments/Interventions/Procedures Outcome: Progressing Towards Goal 
Goal: Psychosocial 
Outcome: Progressing Towards Goal 
Goal: *Oxygen saturation within defined limits Outcome: Progressing Towards Goal 
Goal: *Hemodynamically stable Outcome: Progressing Towards Goal 
Goal: *Optimal pain control at patient's stated goal 
Outcome: Progressing Towards Goal 
Goal: *Anxiety reduced or absent Outcome: Progressing Towards Goal 
Goal: *Demonstrates progressive activity Outcome: Progressing Towards Goal 
  
Problem: Nutrition Deficit Goal: *Optimize nutritional status Outcome: Progressing Towards Goal 
  
Problem: Patient Education: Go to Patient Education Activity Goal: Patient/Family Education Outcome: Progressing Towards Goal 
  
Problem: Patient Education: Go to Patient Education Activity Goal: Patient/Family Education Outcome: Progressing Towards Goal

## 2020-04-26 NOTE — PROGRESS NOTES
Hematology-Oncology Progress Note Sandy Ledesma 
1935 
[id number] 
4/26/2020 Subjective:  
 
Mild pain in knees today. Eating well. No fever. Plan for discharge home tomorrow. Allergies: Statins-hmg-coa reductase inhibitors and Sulfa (sulfonamide antibiotics) Current Facility-Administered Medications Medication Dose Route Frequency Provider Last Rate Last Dose  
 HYDROcodone-acetaminophen (NORCO) 5-325 mg per tablet 1 Tab  1 Tab Oral Q4H PRN Osvaldo Miller MD      
 bisacodyL (DULCOLAX) tablet 5 mg  5 mg Oral DAILY PRN Ozzy Diss M, DO      
 bisacodyL (DULCOLAX) suppository 10 mg  10 mg Rectal DAILY PRN Ozzy Diss M, DO   10 mg at 04/23/20 1846  
 magnesium hydroxide (MILK OF MAGNESIA) 400 mg/5 mL oral suspension 30 mL  30 mL Oral DAILY PRN Ozzy Diss M, DO   30 mL at 04/25/20 1118  
 senna-docusate (PERICOLACE) 8.6-50 mg per tablet 1 Tab  1 Tab Oral DAILY Osvaldo Miller MD   1 Tab at 04/26/20 8209  calcium carbonate (TUMS) chewable tablet 200 mg [elemental]  200 mg Oral TID PRN Osvaldo Miller MD   200 mg at 04/22/20 2356  cefepime (MAXIPIME) 2 g in 0.9% sodium chloride (MBP/ADV) 100 mL  2 g IntraVENous Q24H Osvaldo Miller  mL/hr at 04/26/20 1132 2 g at 04/26/20 1132  epoetin celio-epbx (RETACRIT) injection 10,000 Units  10,000 Units SubCUTAneous Q MON, WED & Santiago Love MD   10,000 Units at 04/24/20 2110  
 sodium chloride (NS) flush 5-10 mL  5-10 mL IntraVENous PRN Osvaldo Miller MD      
 aspirin delayed-release tablet 81 mg  81 mg Oral DAILY Osvaldo Miller MD   81 mg at 04/26/20 7073  clopidogreL (PLAVIX) tablet 75 mg  75 mg Oral DAILY Osvaldo Miller MD   75 mg at 04/26/20 3784  levothyroxine (SYNTHROID) tablet 88 mcg  88 mcg Oral ACB Osvaldo Miller MD   88 mcg at 04/26/20 0150  polyethylene glycol (MIRALAX) packet 17 g  17 g Oral DAILY Osvaldo Miller MD   17 g at 04/26/20 5064  glucose chewable tablet 16 g  4 Tab Oral PRN Toshia Kumar MD      
 glucagon (GLUCAGEN) injection 1 mg  1 mg IntraMUSCular PRN Toshia Kumar MD      
 sodium chloride (NS) flush 5-40 mL  5-40 mL IntraVENous Q8H Toshia Kumar MD   10 mL at 04/26/20 0715  sodium chloride (NS) flush 5-40 mL  5-40 mL IntraVENous PRN Toshia Kumar MD      
 naloxone [facility]) injection 0.4 mg  0.4 mg IntraVENous PRN Toshia Kumar MD      
 dextrose 10% infusion 0-250 mL  0-250 mL IntraVENous PRN Toshia Kumar MD      
 insulin glargine (LANTUS) injection 14 Units  14 Units SubCUTAneous QHS Toshia Kumar MD   14 Units at 04/25/20 2114  insulin lispro (HUMALOG) injection   SubCUTAneous AC&HS Toshia Kumar MD   3 Units at 04/26/20 1132  ondansetron (ZOFRAN) injection 4 mg  4 mg IntraVENous Q4H PRN Toshia Kumar MD   4 mg at 04/20/20 2777  acetaminophen (TYLENOL) tablet 650 mg  650 mg Oral Q4H PRN Toshia Kumar MD   650 mg at 04/25/20 2022  furosemide (LASIX) injection 20 mg  20 mg IntraVENous DAILY Toshia Kumar MD   20 mg at 04/26/20 7883 Objective:  
 
Patient Vitals for the past 24 hrs: 
 BP Temp Pulse Resp SpO2 Weight 04/26/20 0842 115/54 97.7 °F (36.5 °C) 89 19 98 %   
04/26/20 0312 125/49 97.4 °F (36.3 °C) 86 19 98 %   
04/25/20 2011 118/65 98 °F (36.7 °C) 97 20 96 %   
04/25/20 1707  99.4 °F (37.4 °C)      
04/25/20 1415      58.9 kg (129 lb 14.4 oz) 04/25/20 1404 132/71 99.6 °F (37.6 °C) 99 22 97 %  Gen: NAD HEENT: PERRL, Sclerae anicteric Cv: RRR without m/r/g Pulm: CTA bilaterally Abd: NABS, NTND, No HSM Ext: No c/c/e Available labs reviewed: 
Labs:   
Recent Results (from the past 24 hour(s)) GLUCOSE, POC Collection Time: 04/25/20  3:54 PM  
Result Value Ref Range Glucose (POC) 177 (H) 65 - 100 mg/dL Performed by Darin Blankenship GLUCOSE, POC Collection Time: 04/25/20  9:05 PM  
Result Value Ref Range Glucose (POC) 198 (H) 65 - 100 mg/dL Performed by Damián Solo METABOLIC PANEL, COMPREHENSIVE Collection Time: 04/26/20  3:28 AM  
Result Value Ref Range Sodium 134 (L) 136 - 145 mmol/L Potassium 4.0 3.5 - 5.1 mmol/L Chloride 103 97 - 108 mmol/L  
 CO2 25 21 - 32 mmol/L Anion gap 6 5 - 15 mmol/L Glucose 166 (H) 65 - 100 mg/dL BUN 47 (H) 6 - 20 MG/DL Creatinine 1.31 (H) 0.55 - 1.02 MG/DL  
 BUN/Creatinine ratio 36 (H) 12 - 20 GFR est AA 47 (L) >60 ml/min/1.73m2 GFR est non-AA 39 (L) >60 ml/min/1.73m2 Calcium 9.1 8.5 - 10.1 MG/DL Bilirubin, total 0.5 0.2 - 1.0 MG/DL  
 ALT (SGPT) 31 12 - 78 U/L  
 AST (SGOT) 20 15 - 37 U/L Alk. phosphatase 143 (H) 45 - 117 U/L Protein, total 6.8 6.4 - 8.2 g/dL Albumin 2.5 (L) 3.5 - 5.0 g/dL Globulin 4.3 (H) 2.0 - 4.0 g/dL A-G Ratio 0.6 (L) 1.1 - 2.2    
CBC W/O DIFF Collection Time: 04/26/20  3:28 AM  
Result Value Ref Range WBC 12.8 (H) 3.6 - 11.0 K/uL  
 RBC 2.99 (L) 3.80 - 5.20 M/uL HGB 8.8 (L) 11.5 - 16.0 g/dL HCT 28.0 (L) 35.0 - 47.0 % MCV 93.6 80.0 - 99.0 FL  
 MCH 29.4 26.0 - 34.0 PG  
 MCHC 31.4 30.0 - 36.5 g/dL  
 RDW 19.9 (H) 11.5 - 14.5 % PLATELET 188 935 - 086 K/uL MPV 10.7 8.9 - 12.9 FL  
 NRBC 0.0 0  WBC ABSOLUTE NRBC 0.00 0.00 - 0.01 K/uL GLUCOSE, POC Collection Time: 04/26/20  7:14 AM  
Result Value Ref Range Glucose (POC) 195 (H) 65 - 100 mg/dL Performed by Damián Solo GLUCOSE, POC Collection Time: 04/26/20 11:21 AM  
Result Value Ref Range Glucose (POC) 248 (H) 65 - 100 mg/dL Performed by Shen Magaña Assessment and Plan  
 
83 y/o woman with metastatic breast CA, admitted with K. Pneumonia UTI/Sepsis. 1. UTI/Sepsis: much improved. 2. Access: port removed per ID recommendation. 3. Met breast CA: Dr. Swetha Stinson to see as an outpatient to discuss subsequent care. Appreciate excellent hospitalist care.  Will sign off, but available if needed. Alli Root MD 
Hematology/Oncology Phone [phone]

## 2020-04-26 NOTE — PROGRESS NOTES
Care Management Transition of Care Plan: 
· Home with IV antibiotics with [facility]. (They will coordinate delivery time with patient's son-Robin.) · [address] (accepted) · Son-Robin will transport patient home. · Patient will need PICC or midline prior to discharge. · Patient will need IV antibiotic prior to discharge. Spoke with Dr. Gin Encarnacion. The IV access team is not working today. The PICC or midline will not be able to be placed until tomorrow. Discharge will be held until tomorrow. Spoke with Ariela Ng with [facility]. She said the co-pay will be $4 per day. Spoke with patient's son-Robin ([phone]). He said they will pay the $4 per day. Explained patient will need to have the access line placed and receive her dose of IV antibiotic tomorrow before she is discharged. He will plan to pick her up in the afternoon. He is working from home, but has a conference call and will be unavailable tomorrow between 1:30 PM and 4:30 PM. He can  patient prior to or after that time. Ariela Ng with [facility] said they will run the insurance in the morning to confirm everything and then will call [facility] to coordinate at time for delivery later tomorrow. Once insurance is authorized, co-pay may end up being less. Spoke with Bala White at [facility] [facility]. They have accepted the referral and their liaison will follow up tomorrow.   
 
JOSE Zambrano

## 2020-04-26 NOTE — PROGRESS NOTES
Hospitalist Progress Note Lesly Gonzales MD 
Answering service: [phone] OR [phone] from in house phone Date of Service:  2020 NAME:  Rahul Duncan :  1935 MRN:  [id number] Admission Summary: A 82 F p/w UTI and sepsis/Bacteremia Interval history / Subjective: She said she feels better, had bowel movement this morning Assessment & Plan:  
 
Sepsis secondary to Klebsiella bacteremia, UTI:  
-Presented with fever, leukocytosis. Lactic acid WNL 
-Appreciate infectious disease 
-Continues on cefepime. Plan to transition to Ertapenem on discharge. Will need 1 dose prior to discharge to ensure she can tolerate.  
-Indwelling port removed  
-Repeat blood cultures , blood cx on  cultures no growth so far Mild renal insufficiency on CKD stage III. -creatinine stable,  
- avoid nephrotoxins -monitor renal function Acute hypoKalemia 
-replaced, and resolved Mild HypoNatremia 
-resolved, monitor Hx of CAD 
- stable, on aspirin and plavix Chronic Systolic CHF 
-no acute decompensation. 
-continue lasix 
-coreg is held due to hypotension, not on ACEi/ARB due to renal insufficiency Metastatic breast Ca 
-on chemo, recent Neulasta given,  
-Oncology following Protein- calorie malnutrition: Severe, POA 
- poor appetite, nutritional cs, supplements, monitor Code status: Full DVT prophylaxis: SCD Care Plan discussed with: Patient/Family and Nurse Disposition: 24-48 hours I call and updated her son, Luly [phone] including discharge plan, questions answered Hospital Problems  Date Reviewed: 2020 Codes Class Noted POA Bacteremia due to Gram-negative bacteria ICD-10-CM: R78.81 ICD-9-CM: 790.7, 041.85  2020 Yes  
   
 UTI (urinary tract infection) ICD-10-CM: N39.0 ICD-9-CM: 599.0  2020 Yes Systemic inflammatory response syndrome (HCC) ICD-10-CM: R65.10 ICD-9-CM: 995.90  4/19/2020 Yes Review of Systems:  
Pertinent items are mentioned in interval history. Vital Signs:  
 Last 24hrs VS reviewed since prior progress note. Most recent are: 
Visit Vitals /54 (BP 1 Location: Right arm, BP Patient Position: At rest) Pulse 89 Temp 97.7 °F (36.5 °C) Resp 19 Ht 5' 1\" (1.549 m) Wt 58.9 kg (129 lb 14.4 oz) SpO2 98% BMI 24.54 kg/m² Intake/Output Summary (Last 24 hours) at 4/26/2020 1993 Last data filed at 4/26/2020 2511 Gross per 24 hour Intake 782.3 ml Output  Net 782.3 ml Physical Examination:  
 
General:  Alert, oriented, No acute distress, pleasant lady Lung: CTAB Chest: right port removal sight without erythema, swelling Abd:  Soft, non-tender, non-distended Extremities:  No cyanosis or clubbing, no significant edema Neuro:  Grossly normal, no focal neuro deficits, follows commands Psych:  Good insight Data Review:  
 Review and/or order of clinical lab test 
Review and/or order of tests in the radiology section of CPT Review and/or order of tests in the medicine section of CPT Labs:  
 
Recent Labs  
  04/26/20 
0328 04/24/20 
9186 WBC 12.8* 13.5* HGB 8.8* 8.9* HCT 28.0* 26.8*  
 240 Recent Labs  
  04/26/20 
0328 04/24/20 
0353 * 137  
K 4.0 3.9  106 CO2 25 23 BUN 47* 37* CREA 1.31* 1.23* * 126* CA 9.1 8.9 Recent Labs  
  04/26/20 
8385 SGOT 20 ALT 31 * TBILI 0.5 TP 6.8 ALB 2.5*  
GLOB 4.3* No results for input(s): INR, PTP, APTT, INREXT, INREXT in the last 72 hours. No results for input(s): FE, TIBC, PSAT, FERR in the last 72 hours. No results found for: FOL, RBCF No results for input(s): PH, PCO2, PO2 in the last 72 hours. No results for input(s): CPK, CKNDX, TROIQ in the last 72 hours. No lab exists for component: CPKMB Lab Results Component Value Date/Time Cholesterol, total 74 04/16/2018 04:21 AM  
 HDL Cholesterol 25 04/16/2018 04:21 AM  
 LDL, calculated 31.6 04/16/2018 04:21 AM  
 Triglyceride 87 04/16/2018 04:21 AM  
 CHOL/HDL Ratio 3.0 04/16/2018 04:21 AM  
 
Lab Results Component Value Date/Time Glucose (POC) 195 (H) 04/26/2020 07:14 AM  
 Glucose (POC) 198 (H) 04/25/2020 09:05 PM  
 Glucose (POC) 177 (H) 04/25/2020 03:54 PM  
 Glucose (POC) 203 (H) 04/25/2020 11:00 AM  
 Glucose (POC) 172 (H) 04/25/2020 07:10 AM  
 
Lab Results Component Value Date/Time Color YELLOW/STRAW 04/19/2020 11:12 AM  
 Appearance TURBID (A) 04/19/2020 11:12 AM  
 Specific gravity 1.009 04/19/2020 11:12 AM  
 pH (UA) 5.5 04/19/2020 11:12 AM  
 Protein 30 (A) 04/19/2020 11:12 AM  
 Glucose Negative 04/19/2020 11:12 AM  
 Ketone Negative 04/19/2020 11:12 AM  
 Bilirubin Negative 04/19/2020 11:12 AM  
 Urobilinogen 0.2 04/19/2020 11:12 AM  
 Nitrites Negative 04/19/2020 11:12 AM  
 Leukocyte Esterase LARGE (A) 04/19/2020 11:12 AM  
 Epithelial cells FEW 04/19/2020 11:12 AM  
 Bacteria 2+ (A) 04/19/2020 11:12 AM  
 WBC >100 (H) 04/19/2020 11:12 AM  
 RBC 5-10 04/19/2020 11:12 AM  
 
Medications Reviewed:  
 
Current Facility-Administered Medications Medication Dose Route Frequency  HYDROcodone-acetaminophen (NORCO) 5-325 mg per tablet 1 Tab  1 Tab Oral Q4H PRN  
 bisacodyL (DULCOLAX) tablet 5 mg  5 mg Oral DAILY PRN  
 bisacodyL (DULCOLAX) suppository 10 mg  10 mg Rectal DAILY PRN  
 magnesium hydroxide (MILK OF MAGNESIA) 400 mg/5 mL oral suspension 30 mL  30 mL Oral DAILY PRN  
 senna-docusate (PERICOLACE) 8.6-50 mg per tablet 1 Tab  1 Tab Oral DAILY  calcium carbonate (TUMS) chewable tablet 200 mg [elemental]  200 mg Oral TID PRN  
 cefepime (MAXIPIME) 2 g in 0.9% sodium chloride (MBP/ADV) 100 mL  2 g IntraVENous Q24H  
 epoetin celio-epbx (RETACRIT) injection 10,000 Units  10,000 Units SubCUTAneous Q MON, WED & FRI  
  sodium chloride (NS) flush 5-10 mL  5-10 mL IntraVENous PRN  
 aspirin delayed-release tablet 81 mg  81 mg Oral DAILY  clopidogreL (PLAVIX) tablet 75 mg  75 mg Oral DAILY  levothyroxine (SYNTHROID) tablet 88 mcg  88 mcg Oral ACB  polyethylene glycol (MIRALAX) packet 17 g  17 g Oral DAILY  glucose chewable tablet 16 g  4 Tab Oral PRN  
 glucagon (GLUCAGEN) injection 1 mg  1 mg IntraMUSCular PRN  
 sodium chloride (NS) flush 5-40 mL  5-40 mL IntraVENous Q8H  
 sodium chloride (NS) flush 5-40 mL  5-40 mL IntraVENous PRN  
 naloxone (NARCAN) injection 0.4 mg  0.4 mg IntraVENous PRN  
 dextrose 10% infusion 0-250 mL  0-250 mL IntraVENous PRN  
 insulin glargine (LANTUS) injection 14 Units  14 Units SubCUTAneous QHS  insulin lispro (HUMALOG) injection   SubCUTAneous AC&HS  ondansetron (ZOFRAN) injection 4 mg  4 mg IntraVENous Q4H PRN  
 acetaminophen (TYLENOL) tablet 650 mg  650 mg Oral Q4H PRN  
 furosemide (LASIX) injection 20 mg  20 mg IntraVENous DAILY  
______________________________________________________________________ EXPECTED LENGTH OF STAY: 3d 16h ACTUAL LENGTH OF STAY:          7 Ara Wells MD

## 2020-04-27 NOTE — PROGRESS NOTES
Attempted to schedule hospital follow up PCP appointment. Office nurse will contact patient at home with appointment information. Pending patient discharge.   Americo Akins, Care Management Specialist.

## 2020-04-27 NOTE — PROGRESS NOTES
CM received a call from Diane Christine ([phone]) from [facility]. She stated that this pt's medication (IV meds) have been authorized by her insurance and that she has no co-pay. CM informed pt's son. He will be picking pt up this afternoon at around 4:30pm.JEFF Jacob,ACM-SW

## 2020-04-27 NOTE — DISCHARGE SUMMARY
Discharge Summary PATIENT ID: Ruddy De Jesus MRN: [id number] YOB: 1935 DATE OF ADMISSION: 4/19/2020 10:11 AM   
DATE OF DISCHARGE: 4/27/2020 PRIMARY CARE PROVIDER: Pepe Nelson MD  
 
ATTENDING PHYSICIAN: Krystal De La Rosa MD 
DISCHARGING PROVIDER: Ana Rutledge MD   
To contact this individual call [phone] and ask the  to page. If unavailable ask to be transferred the Adult Hospitalist Department. CONSULTATIONS: IP CONSULT TO HOSPITALIST 
IP CONSULT TO CARDIOLOGY 
IP CONSULT TO HEMATOLOGY 
IP CONSULT TO INFECTIOUS DISEASES 
IP CONSULT TO GENERAL SURGERY 
 
PROCEDURES/SURGERIES: Procedure(s): REMOVAL PORT A CATH 
 
ADMITTING DIAGNOSES & HOSPITAL COURSE:  
A 82years old female with PMHx of DM, HTN, HLD, CAD, chronic systolic CHF, CVA, rheumatoid arthritis, CKD, colon cancer, GERD, breast cancer, anemia and hypothyroidism, presenting with complaint of dysuria.  The patient states that she began to experience fatigue and generalized weakness, with associated dysuria and urinary urgency. She has not taken any medication for symptoms prior to arrival.  She reports fevers up to 103 and some nausea this morning which has resolved, no vomiting.  She additionally reports a mild cough for the past 2 weeks due to the allergy.  She denies chest pain, urinary frequency, hematuria, body aches, focal weakness or syncope. She said she always stays home since the [facility] declared emergency. She lives with her sons at home and all family members are ok. No COVID-19 contact history. Sepsis secondary to Klebsiella bacteremia due to UTI POA 
-Presented with fever, leukocytosis. Lactic acid WNL 
-Continues on cefepime. Plan to transition to Ertapenem on discharge. Will need 1 dose prior to discharge to ensure she can tolerate.  
-Indwelling port removed 4/23 
-PICC line is placed on 4/27 
-Repeat blood cultures 4/21, blood cx on 4/23 cultures no growth so far -ID on board and recommended IV Ertapenem 1gm daily ( to be adjusted renally by pharmacy ) till 5/7/20 Mild renal insufficiency on CKD stage III. -creatinine stable,  
- avoid nephrotoxins -monitor renal function Acute hypoKalemia 
-replaced, and resolved Mild HypoNatremia 
-resolved, monitor Hx of CAD 
- stable, on aspirin and plavix  
Chronic Systolic CHF NYHA Class III 
-no acute decompensation. 
-continue lasix 
-coreg is held due to hypotension, not on ACEi/ARB due to renal insufficiency Metastatic breast Ca 
-on chemo, recent Neulasta given,  
-Oncology following Protein- calorie malnutrition: Severe, POA 
- poor appetite, nutritional cs, supplements, monitor 
  
Code status: Full Code DVT prophylaxis: SCD 
 
 
 
DISCHARGE DIAGNOSES / PLAN:   
 
Sepsis secondary to Klebsiella bacteremia due to UTI POA 
-PICC line is placed on 4/27 
-continue  IV Ertapenem 1gm daily ( to be adjusted renally by pharmacy ) till 5/7/20 
-follow up with ID as an outpatient Mild renal insufficiency on CKD stage III. -creatinine stable, Acute hypoKalemia 
-resolved Mild HypoNatremia 
-resolved Hx of CAD 
-continue on aspirin and plavix  
Chronic Systolic CHF NYHA Class III 
-continue lasix 
-coreg is held due to hypotension, not on ACEi/ARB due to renal insufficiency  
-outpatient follow up with Cardiologist 
Metastatic breast Ca 
-follow up with Oncology Protein- calorie malnutrition: Severe, POA 
- continue nutritional supplements PENDING TEST RESULTS:  
At the time of discharge the following test results are still pending: None FOLLOW UP APPOINTMENTS:   
Follow-up Information Follow up With Specialties Details Why Contact Info 1. Lillie Townsend MD Family Practice In one week  [address] [address] 
[phone] 
  
 
2. Shereen Patterson DO, Infectious Disease Specialist on 5/1/2020 at 11 am 
3. Jordan Darden MD, Hem/Onc in 1-2 weeks 4. Ina Joshua MD,  Cardiologist in 1-2 weeks DIET: Cardiac Diet ACTIVITY: Activity as tolerated WOUND CARE: None EQUIPMENT needed: None DISCHARGE MEDICATIONS: 
Current Discharge Medication List  
  
CONTINUE these medications which have NOT CHANGED Details  
!! furosemide (Lasix) 40 mg tablet Take 40 mg by mouth every other day. Alternating with 80 mg every other day   
  
!! furosemide (Lasix) 40 mg tablet Take 80 mg by mouth every other day. Alternating with 40 mg every other day  
  
evolocumab (Repatha SureClick) pen injection 671 mg by SubCUTAneous route every fourteen (14) days. capecitabine (Xeloda) 500 mg tablet 500 mg two (2) times a day. X 7 days then off for 14 days  
  
  
clopidogreL (Plavix) 75 mg tab Take 75 mg by mouth daily. carvediloL (COREG) 12.5 mg tablet TAKE ONE TABLET BY MOUTH TWICE A DAY Qty: 60 Tab, Refills: 5 Associated Diagnoses: Coronary artery disease involving native coronary artery, angina presence unspecified, unspecified whether native or transplanted heart; Essential hypertension  
  
riTUXimab in 0.9% sodium chloride solution 1,000 mg by IntraVENous route every 6 months. insulin aspart U-100 (NOVOLOG FLEXPEN U-100 INSULIN) 100 unit/mL (3 mL) inpn by SubCUTAneous route Before breakfast, lunch, dinner and at bedtime. PER SLIDING SCALE   
  
multivitamin (ONE A DAY) tablet Take 1 Tab by mouth daily. insulin degludec (TRESIBA FLEXTOUCH U-100) 100 unit/mL (3 mL) inpn 14 Units by SubCUTAneous route daily. nitroglycerin (NITROSTAT) 0.4 mg SL tablet 1 Tab by SubLINGual route as needed for Chest Pain. Up to 3 doses. Qty: 40 Tab, Refills: 0  
  
levothyroxine (SYNTHROID) 88 mcg tablet Take 88 mcg by mouth Daily (before breakfast). aspirin delayed-release 81 mg tablet Take 81 mg by mouth daily. OMEGA-3 FATTY ACIDS/FISH OIL (OMEGA 3 FISH OIL PO) Take 300 mg by mouth daily. CHOLECALCIFEROL, VITAMIN D3, (VITAMIN D-3 PO) Take 1,000 Units by mouth daily. !! - Potential duplicate medications found. Please discuss with provider. STOP taking these medications  
  
 polyethylene glycol (MIRALAX) 17 gram packet Comments:  
Reason for Stopping:   
   
  
 
 
 
NOTIFY YOUR PHYSICIAN FOR ANY OF THE FOLLOWING:  
Fever over 101 degrees for 24 hours. Chest pain, shortness of breath, fever, chills, nausea, vomiting, diarrhea, change in mentation, falling, weakness, bleeding. Severe pain or pain not relieved by medications. Or, any other signs or symptoms that you may have questions about. DISPOSITION: 
x  Home With: 
 OT  PT x HH  RN  
  
 Long term SNF/Inpatient Rehab Independent/assisted living Hospice Other:  
 
 
PATIENT CONDITION AT DISCHARGE:  
 
Functional status Poor Deconditioned   
x Independent Cognition  
 x Lucid Forgetful Dementia Catheters/lines (plus indication) [place]   
x PICC   
 PEG None Code status  
 x Full code DNR   
 
PHYSICAL EXAMINATION AT DISCHARGE: 
Patient Vitals for the past 24 hrs: 
 Temp Pulse Resp BP SpO2  
04/27/20 0826 97.8 °F (36.6 °C) 89 17 118/66 97 % 04/27/20 0318 98.2 °F (36.8 °C) 87 18 134/66 96 % General:          Alert, cooperative, no distress, appears stated age. HEENT:           Atraumatic, anicteric sclerae, pink conjunctivae No oral ulcers, mucosa moist, throat clear, dentition fair Neck:               Supple, symmetrical 
Lungs:             Clear to auscultation bilaterally. No Wheezing or Rhonchi. No rales. Chest wall:      No tenderness  No Accessory muscle use. Heart:              Regular  rhythm,  No  murmur   No edema Abdomen:        Soft, non-tender. Not distended. Bowel sounds normal 
Extremities:     No cyanosis.   No clubbing,   
                        Skin turgor normal, Capillary refill normal 
 Skin:                Not pale. Not Jaundiced  No rashes Psych:             Not anxious or agitated. Neurologic:      Alert, moves all extremities, answers questions appropriately and responds to commands Recent Results (from the past 24 hour(s)) GLUCOSE, POC Collection Time: 04/27/20  7:09 AM  
Result Value Ref Range Glucose (POC) 130 (H) 65 - 100 mg/dL Performed by Sandi Mackenzie GLUCOSE, POC Collection Time: 04/27/20 11:18 AM  
Result Value Ref Range Glucose (POC) 269 (H) 65 - 100 mg/dL Performed by [address] [place] [id number]: 
Problem List as of 4/27/2020 Date Reviewed: 4/22/2020 Codes Class Noted - Resolved Bacteremia due to Gram-negative bacteria ICD-10-CM: R78.81 ICD-9-CM: 790.7, 041.85  4/22/2020 - Present UTI (urinary tract infection) ICD-10-CM: N39.0 ICD-9-CM: 599.0  4/19/2020 - Present Systemic inflammatory response syndrome (HCC) ICD-10-CM: R65.10 ICD-9-CM: 995.90  4/19/2020 - Present Septic shock (HCC) ICD-10-CM: A41.9, R65.21 ICD-9-CM: 038.9, 785.52, 995.92  12/25/2019 - Present Dizziness ICD-10-CM: Z81 ICD-9-CM: 780.4  8/25/2019 - Present Type 2 diabetes with nephropathy ([facility] ICD-10-CM: E11.21 
ICD-9-CM: 250.40, 583.81  8/20/2018 - Present CAD (coronary artery disease) ICD-10-CM: I25.10 ICD-9-CM: 414.00  6/21/2018 - Present Metastatic breast cancer ([facility] ICD-10-CM: H27.468 ICD-9-CM: 174.9  6/1/2018 - Present Acute on chronic systolic HF (heart failure) ([facility]) ICD-10-CM: O56.61 ICD-9-CM: 428.23  5/19/2018 - Present CKD (chronic kidney disease) stage 3, GFR 30-59 ml/min ([facility]) ICD-10-CM: N18.3 ICD-9-CM: 585.3  10/25/2017 - Present Vitamin D deficiency ICD-10-CM: E55.9 ICD-9-CM: 268.9  6/16/2017 - Present Asymptomatic hyperuricemia ICD-10-CM: E79.0 ICD-9-CM: 790.6  2/16/2017 - Present Statin intolerance ICD-10-CM: Z78.9 ICD-9-CM: 995.27  12/21/2016 - Present Long-term use of immunosuppressant medication ICD-10-CM: Z79.899 ICD-9-CM: V58.69  11/21/2016 - Present Seropositive rheumatoid arthritis of multiple sites [facility]) ICD-10-CM: M05.79 ICD-9-CM: 714.0  9/28/2016 - Present Primary osteoarthritis of both knees ICD-10-CM: M17.0 ICD-9-CM: 715.16  9/28/2016 - Present Weakness due to cerebrovascular accident ICD-10-CM: GND9682 ICD-9-CM: BSS7527  4/2/2012 - Present PAD (peripheral artery disease) (HCC) ICD-10-CM: I73.9 ICD-9-CM: 443.9  9/7/2011 - Present Carotid bruit ICD-10-CM: R09.89 ICD-9-CM: 785.9  8/31/2011 - Present Hyperlipidemia ICD-10-CM: E78.5 ICD-9-CM: 272.4  1/27/2010 - Present Type 2 diabetes mellitus with neurologic complication, with long-term current use of insulin (HCC) ICD-10-CM: E11.49, Z79.4 ICD-9-CM: 250.60, V58.67  9/2/2009 - Present Colon cancer [facility]) ICD-10-CM: C18.9 ICD-9-CM: 153.9  9/2/2009 - Present Age-related osteoporosis without current pathological fracture ICD-10-CM: M81.0 ICD-9-CM: 733.01  9/2/2009 - Present HTN, goal below 140/90 ICD-10-CM: I10 
ICD-9-CM: 401.9  9/2/2009 - Present Anemia ICD-10-CM: D64.9 ICD-9-CM: 285.9  9/2/2009 - Present RESOLVED: Acute respiratory failure ([address] ICD-10-CM: J96.00 
ICD-9-CM: 518.81  6/21/2018 - 8/8/2019 RESOLVED: Elevated troponin ICD-10-CM: R79.89 ICD-9-CM: 790.6  6/21/2018 - 8/8/2019 RESOLVED: Acute renal failure superimposed on stage 3 chronic kidney disease (HCC) ICD-10-CM: N17.9, N18.3 ICD-9-CM: 584.9, 585.3  6/21/2018 - 2/18/2019 RESOLVED: Gout flare ICD-10-CM: M10.9 ICD-9-CM: 274.01  6/21/2018 - 8/8/2019 RESOLVED: Hyperglycemia due to type 2 diabetes mellitus ([facility] ICD-10-CM: E11.65 ICD-9-CM: 250.00  6/21/2018 - 8/8/2019 RESOLVED: Fluid overload ICD-10-CM: E87.70 ICD-9-CM: 276.69  6/1/2018 - 6/18/2018 RESOLVED: Goals of care, counseling/discussion ICD-10-CM: Z71.89 ICD-9-CM: V65.49  6/1/2018 - 8/8/2019 RESOLVED: Acute systolic heart failure (HCC) ICD-10-CM: I50.21 ICD-9-CM: 428.21  4/24/2018 - 8/8/2019 RESOLVED: Altered mental status, unspecified ICD-10-CM: R41.82 
ICD-9-CM: 780.97  4/23/2018 - 8/8/2019 RESOLVED: SOB (shortness of breath) ICD-10-CM: R06.02 
ICD-9-CM: 786.05  4/16/2018 - 7/4/2018 RESOLVED: Occlusion and stenosis of carotid artery without mention of cerebral infarction ICD-10-CM: I65.29 ICD-9-CM: 433.10  8/23/2013 - 8/8/2019 RESOLVED: Neuropathy in diabetes ([facility] ICD-10-CM: E11.40 ICD-9-CM: 250.60, 357.2  4/2/2012 - 8/8/2019 RESOLVED: Claudication ([facility] ICD-10-CM: I73.9 ICD-9-CM: 443.9  8/31/2011 - 8/8/2019 RESOLVED: Weakness of left arm ICD-10-CM: R29.898 ICD-9-CM: 729.89  8/31/2011 - 8/8/2019 RESOLVED: Hypothyroid ICD-10-CM: E03.9 ICD-9-CM: 244.9  9/2/2009 - 8/8/2019 RESOLVED: H/O: CVA ICD-9-CM: V12.54  9/2/2009 - 8/8/2019 RESOLVED: Microalbuminuria ICD-10-CM: R80.9 ICD-9-CM: 791.0  9/2/2009 - 8/8/2019 Greater than 45 minutes were spent with the patient on counseling and coordination of care Signed:  
Keegan Silveira MD 
4/27/2020 
10:14 AM

## 2020-04-27 NOTE — PROGRESS NOTES
ANDRE received a call from Fabiola Greene ([phone]) from [place]. She stated that she submitted for auth from [employer]. She said that the insurance rep told her that it could take up to 24 hrs to get the auth. If pt wants to leave without the auth, then she or her son would have to sign a waiver for [place] ensuring that they would pay the co-pay should the insurance deny this. ANDRE spoke with pt's son, to inform him. He stated that he would like to wait later today to see if it is authorized. He would also want to know what the co-pay would look like. ANDRE called Fabiola Greene about co-pay amount and she said that she did not have one. She will look into this. Attending notified. Sebastian Espinoza

## 2020-04-27 NOTE — PROCEDURES
PICC Placement Note PRE-PROCEDURE VERIFICATION Correct Procedure: yes Correct Site:  yes Temperature: Temp: 97.8 °F (36.6 °C), Temperature Source: Temp Source: Oral 
Recent Labs  
  04/26/20 
0328 BUN 47* CREA 1.31*  WBC 12.8* Allergies: Statins-hmg-coa reductase inhibitors and Sulfa (sulfonamide antibiotics) Education materials, including PICC Booklet, for PICC Care given to patient: yes. See Patient Education activity for further details. PROCEDURE DETAIL A double lumen PICC line was started for Home IV Therapy. The following documentation is in addition to the PICC properties in the lines/airways flowsheet : 
Lot #: [id number] Was xylocaine 1% used intradermally:  yes Catheter Length: 34 (cm) Vein Selection for PICC:right basilic Central Line Bundle followed yes Complication Related to Insertion: none The placement was verified by ECG/Sapiens technology: The  tip location is on the right side and the tip is in the  superior vena cava. See ECG results for PICC tip placement. Report given to nurse Propel. Line is okay to use.  
 
Irina Montesinos RN

## 2020-04-27 NOTE — PROGRESS NOTES
I have reviewed discharge instructions with the patient. The patient verbalized understanding.  
 
Kendra Santiago, RN

## 2020-04-27 NOTE — PROGRESS NOTES
Hospitalist Progress Note Ara Wells MD 
Answering service: [phone] OR [phone] from in house phone Date of Service:  2020 NAME:  Lnydsay Deal :  1935 MRN:  [id number] Admission Summary: A 82 F p/w UTI and sepsis/Bacteremia Interval history / Subjective: She said she feels better, had bowel movement this morning Assessment & Plan:  
 
Sepsis secondary to Klebsiella bacteremia, UTI:  
-Presented with fever, leukocytosis. Lactic acid WNL 
-Appreciate infectious disease 
-Continues on cefepime. Plan to transition to Ertapenem on discharge. Will need 1 dose prior to discharge to ensure she can tolerate.  
-Indwelling port removed  
-PICC line is placed on  
-Repeat blood cultures , blood cx on  cultures no growth so far Mild renal insufficiency on CKD stage III. -creatinine stable,  
- avoid nephrotoxins -monitor renal function Acute hypoKalemia 
-replaced, and resolved Mild HypoNatremia 
-resolved, monitor Hx of CAD 
- stable, on aspirin and plavix Chronic Systolic CHF 
-no acute decompensation. 
-continue lasix 
-coreg is held due to hypotension, not on ACEi/ARB due to renal insufficiency Metastatic breast Ca 
-on chemo, recent Neulasta given,  
-Oncology following Protein- calorie malnutrition: Severe, POA 
- poor appetite, nutritional cs, supplements, monitor Code status: Full Code DVT prophylaxis: SCD Care Plan discussed with: Patient/Family and Nurse Disposition: 24-48 hours I call and updated her son, Luly [phone] including discharge plan, questions answered Hospital Problems  Date Reviewed: 2020 Codes Class Noted POA Bacteremia due to Gram-negative bacteria ICD-10-CM: R78.81 ICD-9-CM: 790.7, 041.85  2020 Yes  
   
 UTI (urinary tract infection) ICD-10-CM: N39.0 ICD-9-CM: 599.0  2020 Yes Systemic inflammatory response syndrome (HCC) ICD-10-CM: R65.10 ICD-9-CM: 995.90  4/19/2020 Yes Review of Systems:  
Pertinent items are mentioned in interval history. Vital Signs:  
  
Last 24hrs VS reviewed since prior progress note. Most recent are: 
Visit Vitals /66 (BP 1 Location: Right arm, BP Patient Position: At rest) Pulse 89 Temp 97.8 °F (36.6 °C) Resp 17 Ht 5' 1\" (1.549 m) Wt 63 kg (138 lb 14.2 oz) SpO2 97% BMI 26.24 kg/m² No intake or output data in the 24 hours ending 04/27/20 1006 Physical Examination:  
 
General:  Alert, oriented, No acute distress, pleasant lady Lung: CTAB Chest: right port removal sight without erythema, swelling Abd:  Soft, non-tender, non-distended Extremities:  No cyanosis or clubbing, no significant edema Neuro:  Grossly normal, no focal neuro deficits, follows commands Psych:  Good insight Data Review:  
 
Review and/or order of clinical lab test 
Review and/or order of tests in the radiology section of CPT Review and/or order of tests in the medicine section of CPT Labs:  
 
Recent Labs  
  04/26/20 
3565 WBC 12.8* HGB 8.8* HCT 28.0*  
 Recent Labs  
  04/26/20 
8068 * K 4.0  
 CO2 25 BUN 47* CREA 1.31* * CA 9.1 Recent Labs  
  04/26/20 
5930 SGOT 20 ALT 31 * TBILI 0.5 TP 6.8 ALB 2.5*  
GLOB 4.3* No results for input(s): INR, PTP, APTT, INREXT, INREXT in the last 72 hours. No results for input(s): FE, TIBC, PSAT, FERR in the last 72 hours. No results found for: FOL, RBCF No results for input(s): PH, PCO2, PO2 in the last 72 hours. No results for input(s): CPK, CKNDX, TROIQ in the last 72 hours. No lab exists for component: CPKMB Lab Results Component Value Date/Time  Cholesterol, total 74 04/16/2018 04:21 AM  
 HDL Cholesterol 25 04/16/2018 04:21 AM  
 LDL, calculated 31.6 04/16/2018 04:21 AM  
 Triglyceride 87 04/16/2018 04:21 AM  
 CHOL/HDL Ratio 3.0 04/16/2018 04:21 AM  
 
Lab Results Component Value Date/Time Glucose (POC) 130 (H) 04/27/2020 07:09 AM  
 Glucose (POC) 204 (H) 04/26/2020 09:40 PM  
 Glucose (POC) 153 (H) 04/26/2020 04:30 PM  
 Glucose (POC) 248 (H) 04/26/2020 11:21 AM  
 Glucose (POC) 195 (H) 04/26/2020 07:14 AM  
 
Lab Results Component Value Date/Time Color YELLOW/STRAW 04/19/2020 11:12 AM  
 Appearance TURBID (A) 04/19/2020 11:12 AM  
 Specific gravity 1.009 04/19/2020 11:12 AM  
 pH (UA) 5.5 04/19/2020 11:12 AM  
 Protein 30 (A) 04/19/2020 11:12 AM  
 Glucose Negative 04/19/2020 11:12 AM  
 Ketone Negative 04/19/2020 11:12 AM  
 Bilirubin Negative 04/19/2020 11:12 AM  
 Urobilinogen 0.2 04/19/2020 11:12 AM  
 Nitrites Negative 04/19/2020 11:12 AM  
 Leukocyte Esterase LARGE (A) 04/19/2020 11:12 AM  
 Epithelial cells FEW 04/19/2020 11:12 AM  
 Bacteria 2+ (A) 04/19/2020 11:12 AM  
 WBC >100 (H) 04/19/2020 11:12 AM  
 RBC 5-10 04/19/2020 11:12 AM  
 
Medications Reviewed:  
 
Current Facility-Administered Medications Medication Dose Route Frequency  OTHER(NON-FORMULARY) 1 g  1 g IntraVENous DAILY  HYDROcodone-acetaminophen (NORCO) 5-325 mg per tablet 1 Tab  1 Tab Oral Q4H PRN  
 bisacodyL (DULCOLAX) tablet 5 mg  5 mg Oral DAILY PRN  
 bisacodyL (DULCOLAX) suppository 10 mg  10 mg Rectal DAILY PRN  
 magnesium hydroxide (MILK OF MAGNESIA) 400 mg/5 mL oral suspension 30 mL  30 mL Oral DAILY PRN  
 senna-docusate (PERICOLACE) 8.6-50 mg per tablet 1 Tab  1 Tab Oral DAILY  calcium carbonate (TUMS) chewable tablet 200 mg [elemental]  200 mg Oral TID PRN  
 epoetin celio-epbx (RETACRIT) injection 10,000 Units  10,000 Units SubCUTAneous Q MON, WED & FRI  sodium chloride (NS) flush 5-10 mL  5-10 mL IntraVENous PRN  
 aspirin delayed-release tablet 81 mg  81 mg Oral DAILY  clopidogreL (PLAVIX) tablet 75 mg  75 mg Oral DAILY  levothyroxine (SYNTHROID) tablet 88 mcg  88 mcg Oral ACB  polyethylene glycol (MIRALAX) packet 17 g  17 g Oral DAILY  glucose chewable tablet 16 g  4 Tab Oral PRN  
 glucagon (GLUCAGEN) injection 1 mg  1 mg IntraMUSCular PRN  
 sodium chloride (NS) flush 5-40 mL  5-40 mL IntraVENous Q8H  
 sodium chloride (NS) flush 5-40 mL  5-40 mL IntraVENous PRN  
 naloxone (NARCAN) injection 0.4 mg  0.4 mg IntraVENous PRN  
 dextrose 10% infusion 0-250 mL  0-250 mL IntraVENous PRN  
 insulin glargine (LANTUS) injection 14 Units  14 Units SubCUTAneous QHS  insulin lispro (HUMALOG) injection   SubCUTAneous AC&HS  ondansetron (ZOFRAN) injection 4 mg  4 mg IntraVENous Q4H PRN  
 acetaminophen (TYLENOL) tablet 650 mg  650 mg Oral Q4H PRN  
 furosemide (LASIX) injection 20 mg  20 mg IntraVENous DAILY  
______________________________________________________________________ EXPECTED LENGTH OF STAY: 3d 16h ACTUAL LENGTH OF STAY:          8 Lisbeth Rueda MD

## 2020-04-27 NOTE — DISCHARGE INSTRUCTIONS
Discharge Instructions       PATIENT ID: Graham Jones  MRN: [id number]   YOB: 1935    DATE OF ADMISSION: 4/19/2020 10:11 AM    DATE OF DISCHARGE: 4/27/2020    PRIMARY CARE PROVIDER: Asaf Ahumada MD     ATTENDING PHYSICIAN: Pb Day MD  DISCHARGING PROVIDER: Bert Landrum MD    To contact this individual call [phone] and ask the  to page. If unavailable ask to be transferred the Adult Hospitalist Department. DISCHARGE DIAGNOSES  Sepsis secondary to Klebsiella bacteremia, UTI  Mild renal insufficiency on CKD stage III. Acute hypoKalemia  Mild HypoNatremia  Hx of CAD  Chronic Systolic CHF  Metastatic breast Ca  Protein- calorie malnutrition: Severe, POA    CONSULTATIONS: IP CONSULT TO HOSPITALIST  IP CONSULT TO CARDIOLOGY  IP CONSULT TO HEMATOLOGY  IP CONSULT TO INFECTIOUS DISEASES  IP CONSULT TO GENERAL SURGERY    PROCEDURES/SURGERIES: Procedure(s):  REMOVAL PORT A CATH    PENDING TEST RESULTS:   At the time of discharge the following test results are still pending: None    FOLLOW UP APPOINTMENTS:   Follow-up Information     Follow up With Specialties Details Why Contact Info   1. Asaf Ahumada MD Family Practice In one week   [address] [facility]  [phone]       2. Marylen Campi, DO, Infectious Disease Specialist on 5/1/2020 at 11 am  3. Rena Van MD, Hem/Onc in 1-2 weeks  4. Juan A Ecsalera MD,  Cardiologist in 1-2 weeks      ADDITIONAL CARE RECOMMENDATIONS:     DIET: Diabetic Diet     ACTIVITY: Activity as tolerated    WOUND CARE: None    EQUIPMENT needed: Home Abx      DISCHARGE MEDICATIONS:   See Medication Reconciliation Form    · It is important that you take the medication exactly as they are prescribed. · Keep your medication in the bottles provided by the pharmacist and keep a list of the medication names, dosages, and times to be taken in your wallet.    · Do not take other medications without consulting your doctor. NOTIFY YOUR PHYSICIAN FOR ANY OF THE FOLLOWING:   Fever over 101 degrees for 24 hours. Chest pain, shortness of breath, fever, chills, nausea, vomiting, diarrhea, change in mentation, falling, weakness, bleeding. Severe pain or pain not relieved by medications. Or, any other signs or symptoms that you may have questions about.       DISPOSITION:  x  Home With:   OT  PT x HH  RN       SNF/Inpatient Rehab/LTAC    Independent/assisted living    Hospice    Other:     CDMP Checked:   Yes x     PROBLEM LIST Updated:  Yes x       Signed:   Lesly Gonzales MD  4/27/2020  10:20 PM

## 2020-04-27 NOTE — PROGRESS NOTES
Infectious Disease Progress Note HPI: 
 
 
Ms Ibeth Richardson seen earlier Says a sleepy sleepy in the morning but overall improved Getting Ertapenem this am to ensure she tolerates S/P port removal  
Denied diarrhea, nausea, vomiting Denied dysuria , chills, night sweats Denied pain Medications: 
 
 
Current Facility-Administered Medications:  
  HYDROcodone-acetaminophen (NORCO) 5-325 mg per tablet 1 Tab, 1 Tab, Oral, Q4H PRN, Rangel Honeycutt MD 
  bisacodyL (DULCOLAX) tablet 5 mg, 5 mg, Oral, DAILY PRN, Ilda Payer, [employer] M, DO 
  bisacodyL (DULCOLAX) suppository 10 mg, 10 mg, Rectal, DAILY PRN, Tona Homme M, DO, 10 mg at 04/23/20 1846 
  magnesium hydroxide (MILK OF MAGNESIA) 400 mg/5 mL oral suspension 30 mL, 30 mL, Oral, DAILY PRN, Ilda Payer, Brigid M, DO, 30 mL at 04/25/20 1118 
  senna-docusate (PERICOLACE) 8.6-50 mg per tablet 1 Tab, 1 Tab, Oral, DAILY, Rangel Honeycutt MD, 1 Tab at 04/27/20 1017   calcium carbonate (TUMS) chewable tablet 200 mg [elemental], 200 mg, Oral, TID PRN, Rangel Honeycutt MD, 200 mg at 04/22/20 2356   epoetin celio-epbx (RETACRIT) injection 10,000 Units, 10,000 Units, SubCUTAneous, Q MON, WED & Israel Wolfe MD, 10,000 Units at 04/24/20 2110 
  sodium chloride (NS) flush 5-10 mL, 5-10 mL, IntraVENous, PRN, Rangel Honeycutt MD 
  aspirin delayed-release tablet 81 mg, 81 mg, Oral, DAILY, Rangel Honeycutt MD, 81 mg at 04/27/20 1017   clopidogreL (PLAVIX) tablet 75 mg, 75 mg, Oral, DAILY, Rangel Honeycutt MD, 75 mg at 04/27/20 1018   levothyroxine (SYNTHROID) tablet 88 mcg, 88 mcg, Oral, ACB, Rangel Honeycutt MD, 88 mcg at 04/27/20 0630   polyethylene glycol (MIRALAX) packet 17 g, 17 g, Oral, DAILY, Rangel Honeycutt MD, Stopped at 04/27/20 1018 
  glucose chewable tablet 16 g, 4 Tab, Oral, PRN, Rangel Honeycutt MD 
  glucagon (GLUCAGEN) injection 1 mg, 1 mg, IntraMUSCular, PRN, Rangel Honeycutt MD 
   sodium chloride (NS) flush 5-40 mL, 5-40 mL, IntraVENous, Q8H, Darvin Alcaraz MD, 10 mL at 04/27/20 0630 
  sodium chloride (NS) flush 5-40 mL, 5-40 mL, IntraVENous, PRN, Meaghan Vera MD 
  naloxone [facility]) injection 0.4 mg, 0.4 mg, IntraVENous, PRN, Meaghan Vera MD 
  dextrose 10% infusion 0-250 mL, 0-250 mL, IntraVENous, PRN, Meaghan Vera MD 
  insulin glargine (LANTUS) injection 14 Units, 14 Units, SubCUTAneous, QHS, Meaghan Vera MD, 14 Units at 04/26/20 2147 
  insulin lispro (HUMALOG) injection, , SubCUTAneous, AC&HS, Meaghan Vera MD, 5 Units at 04/27/20 1133   ondansetron (ZOFRAN) injection 4 mg, 4 mg, IntraVENous, Q4H PRN, Meaghan Vera MD, 4 mg at 04/20/20 4871   acetaminophen (TYLENOL) tablet 650 mg, 650 mg, Oral, Q4H PRN, Meaghan Vera MD, 650 mg at 04/26/20 2024   furosemide (LASIX) injection 20 mg, 20 mg, IntraVENous, DAILY, Meaghan Vera MD, 20 mg at 04/27/20 1018 Physical Exam: 
 
Vitals:  
Patient Vitals for the past 24 hrs: 
 Temp Pulse Resp BP SpO2  
04/27/20 0826 97.8 °F (36.6 °C) 89 17 118/66 97 % 04/27/20 0318 98.2 °F (36.8 °C) 87 18 134/66 96 % 04/26/20 2055 98.5 °F (36.9 °C) 95 18 130/65 98 % 04/26/20 1528 98.6 °F (37 °C) 100 18 127/58 99 % · GEN: NAD 
· HEENT: no scleral icterus,  no thrush · CV: S1, S2 heard regularly,  no erythema noted on chest wall / no bleeding noted and pervious port site appears without any erythema · Lungs: Clear to auscultation bilaterally · Abdomen: soft, non distended, non tender · Extremities: no edema · Neuro: Alert, oriented to self, place and situation, moves all extremities to commands, verbal  
· Skin: no rash Labs:  
Recent Results (from the past 24 hour(s)) GLUCOSE, POC Collection Time: 04/26/20  4:30 PM  
Result Value Ref Range Glucose (POC) 153 (H) 65 - 100 mg/dL Performed by Chrisandra Favre GLUCOSE, POC  Collection Time: 04/26/20  9:40 PM  
 Result Value Ref Range Glucose (POC) 204 (H) 65 - 100 mg/dL Performed by Darwin Romero GLUCOSE, POC Collection Time: 04/27/20  7:09 AM  
Result Value Ref Range Glucose (POC) 130 (H) 65 - 100 mg/dL Performed by Edson Castellanos GLUCOSE, POC Collection Time: 04/27/20 11:18 AM  
Result Value Ref Range Glucose (POC) 269 (H) 65 - 100 mg/dL Performed by [facility] Micro[facility] Data: 
 
Blood 4/23/20  
    
Component Value Ref Range & Units Status Special Requests: NO SPECIAL REQUESTS    Preliminary Culture result: NO GROWTH 4 DAYS    Preliminary Result History Wound 4/23/20 Specimen Information: Wound Drainage  
    
Component Value Ref Range & Units Status Special Requests: CHEST   Final  
GRAM STAIN RARE WBCS SEEN    Final  
GRAM STAIN NO ORGANISMS SEEN    Final  
Culture result: NO GROWTH 4 DAYS      
 
Specimen Information: Wound Drainage  
    
Component Value Ref Range & Units Status Special Requests: CHEST   Final  
Culture result: NO GROWTH 4 DAYS    Final  
Result History Blood 4/21/20 Component Value Ref Range & Units Status Special Requests: NO SPECIAL REQUESTS    Final  
Culture result: NO GROWTH 5 DAYS    Final  
Result History Blood: 4/19/20 Blood   
    
Component Value Ref Range & Units Status Special Requests: NO SPECIAL REQUESTS    Final  
Culture result: Abnormal     Final  
KLEBSIELLA PNEUMONIAE GROWING IN ALL 4 BOTTLES DRAWN (SITES = RAC AND LAC) Susceptibility Klebsiella pneumoniae FARHAN Amikacin ($) <=2 ug/mL S Ampicillin ($) >=32 ug/mL R Ampicillin/sulbactam ($) 8 ug/mL S Cefazolin ($) <=4 ug/mL S Cefepime ($$) <=1 ug/mL S Cefoxitin <=4 ug/mL S Ceftazidime ($) <=1 ug/mL S Ceftriaxone ($) <=1 ug/mL S Ciprofloxacin ($) <=0.25 ug/mL S   
Ext. Spec. Beta Lactamase Negative ug/mL S Gentamicin ($) <=1 ug/mL S Levofloxacin ($) <=0.12 ug/mL S   
 Meropenem ($$) <=0.25 ug/mL S Piperacillin/Tazobac ($) <=4 ug/mL S Tobramycin ($) <=1 ug/mL S Trimeth/Sulfa <=20 ug/mL S Urine: 4/19/20 Clean catch; Urine   
    
Component Value Ref Range & Units Status Special Requests: NO SPECIAL REQUESTS    Final  
[place] Count >100,000 COLONIES/mL    Final  
Culture result: KLEBSIELLA PNEUMONIAEAbnormal     Final  
Susceptibility Klebsiella pneumoniae FARHAN Amikacin ($) <=2 ug/mL S Ampicillin ($) >=32 ug/mL R Ampicillin/sulbactam ($) 8 ug/mL S Cefazolin ($) <=4 ug/mL S Cefepime ($$) <=1 ug/mL S Cefoxitin <=4 ug/mL S Ceftazidime ($) <=1 ug/mL S Ceftriaxone ($) <=1 ug/mL S Ciprofloxacin ($) <=0.25 ug/mL S   
Ext. Spec. Beta Lactamase Negative ug/mL S Gentamicin ($) <=1 ug/mL S Levofloxacin ($) <=0.12 ug/mL S Meropenem ($$) <=0.25 ug/mL S Nitrofurantoin 64 ug/mL I Piperacillin/Tazobac ($) <=4 ug/mL S Tobramycin ($) <=1 ug/mL S Trimeth/Sulfa <=20 ug/mL S   
 
 
 
12/28/19 BLOOD Component Value Ref Range & Units Status Special Requests: NO SPECIAL REQUESTS    Final  
Culture result: NO GROWTH 5 DAYS    Final  
Result History Urine 12/25/19 Urine   
    
Component Value Ref Range & Units Status Special Requests: NO SPECIAL REQUESTS Reflexed from [id number]    Final  
[place] Count >100,000 COLONIES/mL    Final  
Culture result: ESCHERICHIA COLIAbnormal     Final  
Susceptibility Escherichia coli FARHAN Amikacin ($) <=16 ug/mL S Ampicillin ($) <=8 ug/mL S Ampicillin/sulbactam ($) <=8/4 ug/mL S Aztreonam ($$$$) <=4 ug/mL S Cefazolin ($) <=8 ug/mL S Cefepime ($$) <=4 ug/mL S Cefotaxime <=2 ug/mL S Ceftazidime ($) <=1 ug/mL S Ceftriaxone ($) <=1 ug/mL S Cefuroxime ($) <=4 ug/mL S Ciprofloxacin ($) <=1 ug/mL S Gentamicin ($) <=4 ug/mL S Imipenem <=1 ug/mL S Levofloxacin ($) <=2 ug/mL S Meropenem ($$) <=1 ug/mL S Nitrofurantoin <=32 ug/mL S   
 Piperacillin/Tazobac ($) <=16 ug/mL S Tobramycin ($) <=4 ug/mL S Trimeth/Sulfa <=2/38 ug/mL S   
 
 
12/25/19 Blood Blood   
    
Component Value Ref Range & Units Status Special Requests: NO SPECIAL REQUESTS    Final  
Culture result: Abnormal     Final  
ESCHERICHIA COLI GROWING IN ALL 4 BOTTLES DRAWN (SITE=RAC AND L PORT) Culture result: Abnormal     Final  
PRELIMINARY REPORT OF GRAM NEGATIVE RODS GROWING IN 3 OF 4 BOTTLES DRAWN CALLED TO AND READ BACK BY [address] [place] [address], RN ON 12/26/19 AT 1493. [address] Susceptibility Escherichia coli FARHAN Amikacin ($) <=16 ug/mL S Ampicillin ($) <=8 ug/mL S Ampicillin/sulbactam ($) <=8/4 ug/mL S Aztreonam ($$$$) <=4 ug/mL S Cefazolin ($) <=8 ug/mL S Cefepime ($$) <=4 ug/mL S Cefotaxime <=2 ug/mL S Ceftazidime ($) <=1 ug/mL S Ceftriaxone ($) <=1 ug/mL S Cefuroxime ($) <=4 ug/mL S Ciprofloxacin ($) <=1 ug/mL S Gentamicin ($) <=4 ug/mL S Imipenem <=1 ug/mL S Levofloxacin ($) <=2 ug/mL S Meropenem ($$) <=1 ug/mL S Piperacillin/Tazobac ($) <=16 ug/mL S Tobramycin ($) <=4 ug/mL S Trimeth/Sulfa <=2/38 ug/mL S   
 
 
6/20/18 6/21/2018 10:58 AM - Moi, Lab In [facility]  
 
Specimen Information: Blood  
    
Component Value Ref Range & Units Status Special Requests: NO SPECIAL REQUESTS    Final  
Culture result: Abnormal     Final  
STAPHYLOCOCCUS SPECIES, COAGULASE NEGATIVE GROWING IN 1 OF 4 BOTTLES DRAWN (SITE = R ARM) Culture result:    Final  
REMAINING BOTTLE(S) HAS/HAVE NO GROWTH IN 5 DAYS Imaging: TTE 4/20/20 Interpretation Summary · Normal cavity size and wall thickness. Mild-to-moderate systolic function. Estimated left ventricular ejection fraction is 40 - 45%. Abnormal left ventricular wall motion. Age-appropriate left ventricular diastolic function. · Moderately dilated left atrium. · Mildly dilated right atrium. · Color flow Doppler was used. · Aortic valve leaflet calcification present with reduced excursion. Aortic valve mean gradient is 17 mmHg. Aortic valve area is 1.4 cm2. Moderate aortic valve regurgitation is present. · Mitral valve non-specific thickening. Mild to moderate mitral valve regurgitation is present. · Mild to moderate tricuspid valve regurgitation is present. · Moderate pulmonary hypertension. Pulmonary arterial systolic pressure is 55 mmHg. · Small posterior pericardial effusion. Echo Findings Left Ventricle Normal cavity size and wall thickness. Mild-to-moderate systolic dysfunction. The estimated ejection fraction is 40 - 45%. LV wall motion is noted to be abnormal and apex. There is age-appropriate left ventricular diastolic function. Wall Scoring The following segments are hypokinetic: apical anterior, apical inferior and apex. Other segments could not be evaluated. Left Atrium Moderately dilated left atrium. Patent foramen ovale visualized with left to right shunting indicated by color flow Doppler. No closure device present. Right Ventricle Normal cavity size and global systolic function. Right Atrium Mildly dilated right atrium. Interatrial Septum There was a moderate left to right shunt, shown on color Doppler. Aortic Valve Trileaflet valve structure and no stenosis. There is leaflet calcification with reduced excursion. Aortic valve mean gradient is 17 mmHg. Aortic valve area is 1.4 cm2. Moderate aortic valve regurgitation. There is no vegetation on the aortic valve. Mitral Valve No stenosis. Mitral valve non-specific thickening. Mild to moderate regurgitation. There is no vegetation on the mitral valve. Tricuspid Valve Normal valve structure and no stenosis. Mild to moderate regurgitation. There is no vegetation on the tricuspid valve. Pulmonic Valve Pulmonic valve not well visualized. Aorta Normal aortic root. Pulmonary Artery Pulmonary arterial systolic pressure (PASP) is 55 mmHg. Pulmonary hypertension found to be moderate. Pericardium Small posterior pericardial effusion noted. No indications of tamponade present. Interpretation Summary · Normal cavity size and wall thickness. Mild-to-moderate systolic function. Estimated left ventricular ejection fraction is 40 - 45%. Abnormal left ventricular wall motion. Age-appropriate left ventricular diastolic function. · Moderately dilated left atrium. · Mildly dilated right atrium. · Color flow Doppler was used. · Aortic valve leaflet calcification present with reduced excursion. Aortic valve mean gradient is 17 mmHg. Aortic valve area is 1.4 cm2. Moderate aortic valve regurgitation is present. · Mitral valve non-specific thickening. Mild to moderate mitral valve regurgitation is present. · Mild to moderate tricuspid valve regurgitation is present. · Moderate pulmonary hypertension. Pulmonary arterial systolic pressure is 55 mmHg. · Small posterior pericardial effusion. CXR 4/19/20 PA and lateral views demonstrate normal heart size. Persistent mild diffuse 
interstitial prominence again noted. Osseous metastatic disease is unchanged. Port-A-Cath is able in position. Calcified granuloma again noted in the right 
apex. Old left rib fractures. 
  
IMPRESSION Impression: Persistent mild diffuse interstitial prominence again noted and 
grossly unchanged compared to 12/31/2019. Stable osseous metastatic disease. Assessment / Plan:  
 
Ms Destiny Stock Is a 61-year-old lady with a history of metastatic breast cancer with an indwelling port status post chemotherapy about a week ago, rheumatoid arthritis, coronary artery disease, osteoporosis, systolic heart failure, colon cancer, diabetes, E. coli bacteremia in December 2019 who presents with fatigue, fevers, feeling cold and nausea.    
 
 During this admission, noted to have a T-max of 102.9 and initially hypotensive with a systolic blood pressure in the 80s. She was started on ceftriaxone IV. Labs show uptrending leukocytosis with neutrophilic predominance. She also has some lymphocytopenia. Her chest x-ray has been read as \" persistent mild diffuse interstitial prominence again noted and grossly unchanged compared to 12/31/2019. Stable osseous metastatic disease. \" Her blood cultures dated 4/19/2020 is positive for gram-negative arturo in all 4 bottles obtained from right AC and left AC. Urine culture from 4/19/2020 also has been in the 100,000 gram-negative rods. Her urine analysis is positive for large leukocyte Estrace, negative nitrates, 2+ bacteria and greater than 100 white count. Of note, she had E. coli grew out of blood cultures dated 12/25/19 and the site has been indicated as  \"Right AC and left port \". Repeat blood culture on 12/28/2020 was negative. A transthoracic echo from 12/27/2019 was read as aortic valve sclerosis moderate regurgitation of the mitral valve. Tricuspid and pulmonic valves were read as normal valve structure. It appears that she was treated with IV antibiotics initially and then transition to oral Cipro to complete a 14-day course of antibiotic therapy to be stopped on 1/7/2020. 
 
 
1) Klebsiella bacteremia in the setting of chemotherapy a week ago with an indwelling port Of note she had E. coli growing in her blood cultures in December 2019. This was reported to be right before meals and left port as site. Her port however is on the right chest area Recommendations S/P port removal 4/23/20 Was on Iv Cefepime while hospitalized On DC Ertapenem IV 1gm once a day as far as Cr CL > 30 ( and 500 mg every day if Cr CL < 30) till 5/7/20 DC picc line once IV antibiotics completed from ID standpoint unless plans for chemo via picc by oncology F/U with me on 5/1/20 at 11 am ( virtual visit )  Antibiotic side effects potential adverse effects including the ability to lower seizure threshold, risk for C. difficile infection,, renal, hematological and GI issues were discussed Probiotics or yogurt encouraged with patient today 2) metastatic breast cancer Reviewed her last oncology note from 1/6/2020. \" Has had nice response to chemo (abraxane/xeloda) with recent documented decrease in hepatic and axillary involvement. .. her last rx was 12/16 (cycle 5 ) she received 7 days of xeloda ending on 12/22. Amado marquezlasta on 12/23\" 3) coronary artery disease, systolic dysfunction Plans per cardiology Noted she is on Plavix 4) rheumatoid arthritis We will need to clarify if she is getting a DMARD or any immunosuppressive medication which increases the risk for infections 5) marked leukocytosis likely in the setting of bacteremia Continue to monitor as high risk for C. difficile infection as well which can present with marked leukocytosis 6) history of colon cancer per chart 7) osteoporosis per chart 8) DVT prophylaxis per primary team, noted she is on Plavix Discussed above with the  today Thank for the opportunity to participate in the care of this patient. Please contact with questions or concerns.   
 
Eve Pollard,  
12:06 PM

## 2020-04-27 NOTE — PROGRESS NOTES
Occupational Therapy: Chart reviewed and spoke with nurse. Patient just received her PICC line. States she would like to rest post procedure. Spoke with  and noted plan for discharge today with son. Will follow.  
SARIAH Green

## 2020-04-27 NOTE — PROGRESS NOTES
ANDRE fax attached final IV antibx orders and PICC line report to Francoise via [employer]. ANDRE spoke with CM spoke with Brianne Naranjo ([phone]) from [place] and she confirmed that she received these items. She is working on getting an [address] [facility] [address] [facility] and she will call this CM about this. ANDRE spoke with Rigoberto Santoyo with [facility]. She stated that she can accept this pt for hh PT, OT and SN for IV antibx. She is aware that pt is being discharged today. ANDRE spoke with pt's son, Kiet Carrington ([phone]) to inform him of above. ANDRE informed him that pt will get the new IV antibiotic around 2 today. He said that he will plan to pick pt up around 4:30pm or so. Pt's nurse notified. Douglas Ibrahim

## 2020-04-27 NOTE — TELEPHONE ENCOUNTER
Adelaida from [facility] called to schedule MOY appt for pt by Friday.  Pt d/c on 04/27/2020 or UTI/sepsis     Pt has not arrived home as of this point    BCB#  [phone]

## 2020-04-27 NOTE — TELEPHONE ENCOUNTER
Called, spoke to pt. Son: Radha Brenner   Two pt identifiers confirmed. Writer won WINTER appt with Dr. Mamie Varela on Friday via virtual call. Son verbalized understanding of information discussed w/ no further questions at this time.

## 2020-05-01 NOTE — TELEPHONE ENCOUNTER
Two pt identifiers confirmed. Spoke to [employer] [facility]) and informed her per Dr. Alvino Godwin down below:     Preethi Lord, DO  Justin Self,   I am trying to reach Dr Silvia Hamilton from [facility].   We need to check wt him before line is removed on 5/7   If they need line for chemo, we should not have home health remove and they will need further orders after 5/7 for picc line from oncology. If they don't need line for chemo, then dc 5/7      [employer] states, will send mssg to nurse for a return call. [employer] verbalized understanding of information discussed w/ no further questions at this time.

## 2020-05-01 NOTE — PROGRESS NOTES
Travel Questionnaire 1. Has pt traveled outside [facility] or [facility] in the past 30 days? no  
2. Has the pt been in contact with anyone suspected or confirmed dx of COVID-19 or flu? no  
3.  Any sx? no

## 2020-05-01 NOTE — PROGRESS NOTES
Reinier Soto is a 82 y.o. female who was seen by synchronous (real-time) audio-video technology on 5/1/2020. Consent: Reinier Soto, who was seen by synchronous (real-time) audio-video technology, and/or her healthcare decision maker, is aware that this patient-initiated, Telehealth encounter on 5/1/2020 is a billable service, with coverage as determined by her insurance carrier. She is aware that she may receive a bill and has provided verbal consent to proceed: Yes. Assessment & Plan:  
Diagnoses and all orders for this visit: 
 
1. Infection, Klebsiella 2. Bacteremia 3. Malignant neoplasm of female breast, unspecified estrogen receptor status, unspecified laterality, unspecified site of breast ([facility] [address] 4. Hepatic metastasis ([facility] [address] 5. Coronary artery disease involving native heart without angina pectoris, unspecified vessel or lesion type Assessment / Plan:  
 
Ms Ludivina Melgar Is a 47-year-old lady with a history of metastatic breast cancer with an indwelling port status post chemotherapy about a week ago, rheumatoid arthritis, coronary artery disease, osteoporosis, systolic heart failure, colon cancer, diabetes, E. coli bacteremia in December 2019 who presents with fatigue, fevers, feeling cold and nausea. During that admission, noted to have a T-max of 102.9 and initially hypotensive with a systolic blood pressure in the 80s. She was started on ceftriaxone IV. Labs show uptrending leukocytosis with neutrophilic predominance. She also has some lymphocytopenia. Her chest x-ray has been read as \" persistent mild diffuse interstitial prominence again noted and grossly unchanged compared to 12/31/2019. Stable osseous metastatic disease. \" Her blood cultures dated 4/19/2020 is positive for gram-negative arturo in all 4 bottles obtained from right AC and left AC. Urine culture from 4/19/2020 also has been in the 100,000 gram-negative rods.   Her urine analysis is positive for large leukocyte Estrace, negative nitrates, 2+ bacteria and greater than 100 white count. Of note, she had E. coli grew out of blood cultures dated 12/25/19 and the site has been indicated as  \"Right AC and left port \". Repeat blood culture on 12/28/2020 was negative. A transthoracic echo from 12/27/2019 was read as aortic valve sclerosis moderate regurgitation of the mitral valve. Tricuspid and pulmonic valves were read as normal valve structure. It appears that she was treated with IV antibiotics initially and then transition to oral Cipro to complete a 14-day course of antibiotic therapy to be stopped on 1/7/2020. 
 
 
1) Klebsiella bacteremia in the setting of chemotherapy S/P port removal 4/23/20 Was on Iv Cefepime while hospitalized On DC Ertapenem IV 1gm once a day as far as Cr CL > 30 ( and 500 mg every day if Cr CL < 30) till 5/7/20 DC picc line once IV antibiotics completed from ID standpoint unless plans for chemo via picc by oncology . I will reach Dr Bryanna Shaw from [facility] 3. to discuss re line discontinuation after IV antibiotic completion F/U with me 2 weeks Antibiotic side effects potential adverse effects including the ability to lower seizure threshold, risk for C. difficile infection,, renal, hematological and GI issues were discussed Probiotics or yogurt encouraged with patient today 2) metastatic breast cancer Reviewed her last oncology note from 1/6/2020. \" Has had nice response to chemo (abraxane/xeloda) with recent documented decrease in hepatic and axillary involvement. .. her last rx was 12/16 (cycle 5 ) she received 7 days of xeloda ending on 12/22. Taoe Bevels than neulasta on 12/23\" 3) coronary artery disease, systolic dysfunction Plans per cardiology Noted she is on Plavix 4) rheumatoid arthritis We will need to clarify if she is getting a DMARD or any immunosuppressive medication which increases the risk for infections 5) marked leukocytosis likely in the setting of bacteremia Continue to monitor as high risk for C. difficile infection as well which can present with marked leukocytosis 6) history of colon cancer per chart 7) osteoporosis per chart I spent at least 23 minutes on this visit with this established patient. ([id number]) HPI Ms. Chapin Garzon was seen on a virtual visit for Klebsiella bacteremia Her son was also present at her side to assist with the virtual follow-up She states that she is more tired in the mornings than usual 
Tiredness improves as the day moves all Her son states that she usually goes to rehab prior to coming to home from hospitalization and that may have something to do with this She does have PT come in and work with her Her son notes that she had blood work done at her oncologist office yesterday She has been getting her antibiotics without any issues so far she denies any nausea vomiting or diarrhea She denies any fevers or chills She does take Tylenol once a day but denies any pain or fevers She denies any cough or shortness of breath Prior to Admission medications Medication Sig Start Date End Date Taking? Authorizing Provider  
glucosam/wesley-msm1/C/bassam/bosw (OSTEO BI-FLEX TRIPLE STRENGTH PO) Take 2 Tabs by mouth daily. Yes Diamond Graves MD  
lecithin, soy 1,200 mg cap Take 1 Cap by mouth daily. Yes Diamond Graves MD  
furosemide (Lasix) 40 mg tablet Take 40 mg by mouth every other day. Alternating with 80 mg every other day    Yes Provider, Historical  
evolocumab (Repatha SureClick) pen injection 073 mg by SubCUTAneous route every fourteen (14) days. Yes Provider, Historical  
clopidogreL (Plavix) 75 mg tab Take 75 mg by mouth daily. Yes Provider, Historical  
carvediloL (COREG) 12.5 mg tablet TAKE ONE TABLET BY MOUTH TWICE A DAY Patient taking differently: Take 6.25 mg by mouth two (2) times a day.  3/17/20  Yes Malik Styles MD  
 riTUXimab in 0.9% sodium chloride solution 1,000 mg by IntraVENous route every 6 months. Yes Provider, Historical  
multivitamin (ONE A DAY) tablet Take 1 Tab by mouth daily. Yes Provider, Historical  
insulin degludec (TRESIBA FLEXTOUCH U-100) 100 unit/mL (3 mL) inpn 14 Units by SubCUTAneous route daily. Yes Provider, Historical  
nitroglycerin (NITROSTAT) 0.4 mg SL tablet 1 Tab by SubLINGual route as needed for Chest Pain. Up to 3 doses. 6/6/18  Yes Devi Vidal MD  
levothyroxine (SYNTHROID) 88 mcg tablet Take 88 mcg by mouth Daily (before breakfast). Yes Provider, Historical  
aspirin delayed-release 81 mg tablet Take 81 mg by mouth daily. Yes Provider, Historical  
OMEGA-3 FATTY ACIDS/FISH OIL (OMEGA 3 FISH OIL PO) Take 600 mg by mouth daily. Yes Provider, Historical  
CHOLECALCIFEROL, VITAMIN D3, (VITAMIN D-3 PO) Take 1,000 Units by mouth daily. Yes Provider, Historical  
ertapenem (INVANZ) 1 gram solr injection 1 g by IntraVENous route daily. Patric Jernigan MD  
furosemide (Lasix) 40 mg tablet Take 40 mg by mouth daily. Provider, Historical  
insulin aspart U-100 (NOVOLOG FLEXPEN U-100 INSULIN) 100 unit/mL (3 mL) inpn by SubCUTAneous route Before breakfast, lunch, dinner and at bedtime. 2-4 units PER SLIDING SCALE     Provider, Historical  
 
Allergies Allergen Reactions  Statins-Hmg-Coa Reductase Inhibitors Other (comments) Intolerant to statins  Sulfa (Sulfonamide Antibiotics) Other (comments)  
  syncope Patient Active Problem List  
Diagnosis Code  Type 2 diabetes mellitus with neurologic complication, with long-term current use of insulin ([facility]) E11.49, Z79.4  Colon cancer ([facility] [address] C18.9  Age-related osteoporosis without current pathological fracture M81.0  
 HTN, goal below 140/90 I10  Anemia D64.9  Hyperlipidemia E78.5  Carotid bruit R09.89  
 PAD (peripheral artery disease) ([facility]) I73.9  Weakness due to cerebrovascular accident [id number]  Seropositive rheumatoid arthritis of multiple sites ([facility] M05.79  
 Primary osteoarthritis of both knees M17.0  Long-term use of immunosuppressant medication Z79.899  Statin intolerance Z78.9  Asymptomatic hyperuricemia E79.0  Vitamin D deficiency E55.9  CKD (chronic kidney disease) stage 3, GFR 30-59 ml/min ([facility]) N18.3  Acute on chronic systolic HF (heart failure) ([facility]) I50.23  
 Metastatic breast cancer ([facility] C50.919  
 CAD (coronary artery disease) I25.10  Type 2 diabetes with nephropathy ([facility]) E11.21  
 Dizziness R42  Septic shock ([facility]) A41.9, R65.21  
 UTI (urinary tract infection) N39.0  Systemic inflammatory response syndrome ([facility]) R65.10  Bacteremia due to Gram-negative bacteria R78.81  
 
 
ROS 10 point review of systems obtained Per HPI All others negative Objective:  
Vital Signs: (As obtained by patient/caregiver at home) Visit Vitals /59 Temp 98.2 °F (36.8 °C) Wt 128 lb 11.2 oz (58.4 kg) BMI 24.32 kg/m² [INSTRUCTIONS:  \"[x]\" Indicates a positive item  \"[]\" Indicates a negative item  -- DELETE ALL ITEMS NOT EXAMINED] Constitutional: She is seated in a chair and appears a little tired but no other apparent distress Mental status: [x] Alert and awake  [] Oriented to person/place/time [x] Able to follow commands   
[] Abnormal - Eyes:   EOM    [x]  Normal    [] Abnormal -  
Sclera  [x]  Normal    [] Abnormal - 
        Discharge []  None visible   [] Abnormal - HENT: [x] Normocephalic, atraumatic  [] Abnormal -  
[x] Mouth/Throat: Mucous membranes are moist, unable to visualize any thrush/mucositis on this virtual visit External Ears [x] Normal  [] Abnormal - Neck: [] No visualized mass [] Abnormal - Pulmonary/Chest: [x] Respiratory effort normal   [x] No visualized signs of difficulty breathing or respiratory distress 
      [] Abnormal -  
  
 Musculoskeletal:   [] Normal gait with no signs of ataxia [] Normal range of motion of neck [] Abnormal -  
 
Neurological:        [x] No Facial Asymmetry (Cranial nerve 7 motor function) (limited exam due to video visit) [] No gaze palsy  
     [] Abnormal -   
     
Skin:        [x] No significant exanthematous lesions or discoloration noted on facial skin   
     [] Abnormal - Psychiatric:       [] Normal Affect [] Abnormal -  
     [] No Hallucinations Other pertinent observable physical exam findings:-PICC line site visualized without any erythema around the PICC line at this time We discussed the expected course, resolution and complications of the diagnosis(es) in detail. Medication risks, benefits, costs, interactions, and alternatives were discussed as indicated. I advised her to contact the office if her condition worsens, changes or fails to improve as anticipated. She expressed understanding with the diagnosis(es) and plan. Haylee Walsh is a 82 y.o. female who was evaluated by a video visit encounter for concerns as above. Patient identification was verified prior to start of the visit. A caregiver was present when appropriate. Due to this being a TeleHealth encounter (During NCGGT-27 public health emergency), evaluation of the following organ systems was limited: Vitals/Constitutional/EENT/Resp/CV/GI//MS/Neuro/Skin/Heme-Lymph-Imm. Pursuant to the emergency declaration under the [facility] waiver authority and the [employer] and [employer] General Act, this Virtual  Visit was conducted, with patient's (and/or legal guardian's) consent, to reduce the patient's risk of exposure to COVID-19 and provide necessary medical care. Services were provided through a video synchronous discussion virtually to substitute for in-person clinic visit.    Patient and provider were located at their individual homes. Galina OdettedanielDO Denied pain Medications: 
 
 
Current Outpatient Medications:  
  glucosam/wesley-msm1/C/bassam/bosw (OSTEO BI-FLEX TRIPLE STRENGTH PO), Take 2 Tabs by mouth daily. , Disp: , Rfl:  
  lecithin, soy 1,200 mg cap, Take 1 Cap by mouth daily. , Disp: , Rfl:  
  furosemide (Lasix) 40 mg tablet, Take 40 mg by mouth every other day. Alternating with 80 mg every other day , Disp: , Rfl:  
  evolocumab (Repatha SureClick) pen injection, 140 mg by SubCUTAneous route every fourteen (14) days. , Disp: , Rfl:  
  clopidogreL (Plavix) 75 mg tab, Take 75 mg by mouth daily. , Disp: , Rfl:  
  carvediloL (COREG) 12.5 mg tablet, TAKE ONE TABLET BY MOUTH TWICE A DAY (Patient taking differently: Take 6.25 mg by mouth two (2) times a day.), Disp: 60 Tab, Rfl: 5 
  riTUXimab in 0.9% sodium chloride solution, 1,000 mg by IntraVENous route every 6 months., Disp: , Rfl:  
  multivitamin (ONE A DAY) tablet, Take 1 Tab by mouth daily. , Disp: , Rfl:  
  insulin degludec (TRESIBA FLEXTOUCH U-100) 100 unit/mL (3 mL) inpn, 14 Units by SubCUTAneous route daily. , Disp: , Rfl:  
  nitroglycerin (NITROSTAT) 0.4 mg SL tablet, 1 Tab by SubLINGual route as needed for Chest Pain. Up to 3 doses. , Disp: 40 Tab, Rfl: 0 
  levothyroxine (SYNTHROID) 88 mcg tablet, Take 88 mcg by mouth Daily (before breakfast). , Disp: , Rfl:  
  aspirin delayed-release 81 mg tablet, Take 81 mg by mouth daily. , Disp: , Rfl:  
  OMEGA-3 FATTY ACIDS/FISH OIL (OMEGA 3 FISH OIL PO), Take 600 mg by mouth daily. , Disp: , Rfl:  
  CHOLECALCIFEROL, VITAMIN D3, (VITAMIN D-3 PO), Take 1,000 Units by mouth daily. , Disp: , Rfl:  
  ertapenem (INVANZ) 1 gram solr injection, 1 g by IntraVENous route daily. , Disp: , Rfl:  
  furosemide (Lasix) 40 mg tablet, Take 40 mg by mouth daily. , Disp: , Rfl:  
  insulin aspart U-100 (NOVOLOG FLEXPEN U-100 INSULIN) 100 unit/mL (3 mL) inpn, by SubCUTAneous route Before breakfast, lunch, dinner and at bedtime. 2-4 units PER SLIDING SCALE , Disp: , Rfl:  
 
 
Physical Exam: 
 
 
Labs: We will have our office obtain her labs from 4/30/2020 from her oncologist office Await labs that are being done every Monday Microbiology Data: 
 
Blood 4/23/20  
    
Component Value Ref Range & Units Status Special Requests: NO SPECIAL REQUESTS    Preliminary Culture result: NO GROWTH 4 DAYS    Preliminary Result History Wound 4/23/20 Specimen Information: Wound Drainage  
    
Component Value Ref Range & Units Status Special Requests: CHEST   Final  
GRAM STAIN RARE WBCS SEEN    Final  
GRAM STAIN NO ORGANISMS SEEN    Final  
Culture result: NO GROWTH 4 DAYS      
 
Specimen Information: Wound Drainage  
    
Component Value Ref Range & Units Status Special Requests: CHEST   Final  
Culture result: NO GROWTH 4 DAYS    Final  
Result History Blood 4/21/20 Component Value Ref Range & Units Status Special Requests: NO SPECIAL REQUESTS    Final  
Culture result: NO GROWTH 5 DAYS    Final  
Result History Blood: 4/19/20 Blood   
    
Component Value Ref Range & Units Status Special Requests: NO SPECIAL REQUESTS    Final  
Culture result: Abnormal     Final  
KLEBSIELLA PNEUMONIAE GROWING IN ALL 4 BOTTLES DRAWN (SITES = RAC AND LAC) Susceptibility Klebsiella pneumoniae FARHAN Amikacin ($) <=2 ug/mL S Ampicillin ($) >=32 ug/mL R Ampicillin/sulbactam ($) 8 ug/mL S Cefazolin ($) <=4 ug/mL S Cefepime ($$) <=1 ug/mL S Cefoxitin <=4 ug/mL S Ceftazidime ($) <=1 ug/mL S Ceftriaxone ($) <=1 ug/mL S Ciprofloxacin ($) <=0.25 ug/mL S   
Ext. Spec. Beta Lactamase Negative ug/mL S Gentamicin ($) <=1 ug/mL S Levofloxacin ($) <=0.12 ug/mL S Meropenem ($$) <=0.25 ug/mL S Piperacillin/Tazobac ($) <=4 ug/mL S Tobramycin ($) <=1 ug/mL S Trimeth/Sulfa <=20 ug/mL S   
 
 
 
 
 Urine: 4/19/20 Clean catch; Urine   
    
Component Value Ref Range & Units Status Special Requests: NO SPECIAL REQUESTS    Final  
[place] Count >100,000 COLONIES/mL    Final  
Culture result: KLEBSIELLA PNEUMONIAEAbnormal     Final  
Susceptibility Klebsiella pneumoniae FARHAN Amikacin ($) <=2 ug/mL S Ampicillin ($) >=32 ug/mL R Ampicillin/sulbactam ($) 8 ug/mL S Cefazolin ($) <=4 ug/mL S Cefepime ($$) <=1 ug/mL S Cefoxitin <=4 ug/mL S Ceftazidime ($) <=1 ug/mL S Ceftriaxone ($) <=1 ug/mL S Ciprofloxacin ($) <=0.25 ug/mL S   
Ext. Spec. Beta Lactamase Negative ug/mL S Gentamicin ($) <=1 ug/mL S Levofloxacin ($) <=0.12 ug/mL S Meropenem ($$) <=0.25 ug/mL S Nitrofurantoin 64 ug/mL I Piperacillin/Tazobac ($) <=4 ug/mL S Tobramycin ($) <=1 ug/mL S Trimeth/Sulfa <=20 ug/mL S   
 
 
 
12/28/19 BLOOD Component Value Ref Range & Units Status Special Requests: NO SPECIAL REQUESTS    Final  
Culture result: NO GROWTH 5 DAYS    Final  
Result History Urine 12/25/19 Urine   
    
Component Value Ref Range & Units Status Special Requests: NO SPECIAL REQUESTS Reflexed from [id number]    Final  
[place] Count >100,000 COLONIES/mL    Final  
Culture result: ESCHERICHIA COLIAbnormal     Final  
Susceptibility Escherichia coli FARHAN Amikacin ($) <=16 ug/mL S Ampicillin ($) <=8 ug/mL S Ampicillin/sulbactam ($) <=8/4 ug/mL S Aztreonam ($$$$) <=4 ug/mL S Cefazolin ($) <=8 ug/mL S Cefepime ($$) <=4 ug/mL S Cefotaxime <=2 ug/mL S Ceftazidime ($) <=1 ug/mL S Ceftriaxone ($) <=1 ug/mL S Cefuroxime ($) <=4 ug/mL S Ciprofloxacin ($) <=1 ug/mL S Gentamicin ($) <=4 ug/mL S Imipenem <=1 ug/mL S Levofloxacin ($) <=2 ug/mL S Meropenem ($$) <=1 ug/mL S Nitrofurantoin <=32 ug/mL S Piperacillin/Tazobac ($) <=16 ug/mL S Tobramycin ($) <=4 ug/mL S Trimeth/Sulfa <=2/38 ug/mL S   
 
 
12/25/19 Blood Blood   
    
 Component Value Ref Range & Units Status Special Requests: NO SPECIAL REQUESTS    Final  
Culture result: Abnormal     Final  
ESCHERICHIA COLI GROWING IN ALL 4 BOTTLES DRAWN (SITE=RAC AND L PORT) Culture result: Abnormal     Final  
PRELIMINARY REPORT OF GRAM NEGATIVE RODS GROWING IN 3 OF 4 BOTTLES DRAWN CALLED TO AND READ BACK BY [address] [place] [address] Street, RN ON 12/26/19 AT 7595. [address] Susceptibility Escherichia coli FARHAN Amikacin ($) <=16 ug/mL S Ampicillin ($) <=8 ug/mL S Ampicillin/sulbactam ($) <=8/4 ug/mL S Aztreonam ($$$$) <=4 ug/mL S Cefazolin ($) <=8 ug/mL S Cefepime ($$) <=4 ug/mL S Cefotaxime <=2 ug/mL S Ceftazidime ($) <=1 ug/mL S Ceftriaxone ($) <=1 ug/mL S Cefuroxime ($) <=4 ug/mL S Ciprofloxacin ($) <=1 ug/mL S Gentamicin ($) <=4 ug/mL S Imipenem <=1 ug/mL S Levofloxacin ($) <=2 ug/mL S Meropenem ($$) <=1 ug/mL S Piperacillin/Tazobac ($) <=16 ug/mL S Tobramycin ($) <=4 ug/mL S Trimeth/Sulfa <=2/38 ug/mL S   
 
 
6/20/18 6/21/2018 10:58 AM - Moi, Lab In [facility]  
 
Specimen Information: Blood  
    
Component Value Ref Range & Units Status Special Requests: NO SPECIAL REQUESTS    Final  
Culture result: Abnormal     Final  
STAPHYLOCOCCUS SPECIES, COAGULASE NEGATIVE GROWING IN 1 OF 4 BOTTLES DRAWN (SITE = R ARM) Culture result:    Final  
REMAINING BOTTLE(S) HAS/HAVE NO GROWTH IN 5 DAYS Imaging: TTE 4/20/20 Interpretation Summary · Normal cavity size and wall thickness. Mild-to-moderate systolic function. Estimated left ventricular ejection fraction is 40 - 45%. Abnormal left ventricular wall motion. Age-appropriate left ventricular diastolic function. · Moderately dilated left atrium. · Mildly dilated right atrium. · Color flow Doppler was used. · Aortic valve leaflet calcification present with reduced excursion. Aortic valve mean gradient is 17 mmHg. Aortic valve area is 1.4 cm2. Moderate aortic valve regurgitation is present. · Mitral valve non-specific thickening. Mild to moderate mitral valve regurgitation is present. · Mild to moderate tricuspid valve regurgitation is present. · Moderate pulmonary hypertension. Pulmonary arterial systolic pressure is 55 mmHg. · Small posterior pericardial effusion. Echo Findings Left Ventricle Normal cavity size and wall thickness. Mild-to-moderate systolic dysfunction. The estimated ejection fraction is 40 - 45%. LV wall motion is noted to be abnormal and apex. There is age-appropriate left ventricular diastolic function. Wall Scoring The following segments are hypokinetic: apical anterior, apical inferior and apex. Other segments could not be evaluated. Left Atrium Moderately dilated left atrium. Patent foramen ovale visualized with left to right shunting indicated by color flow Doppler. No closure device present. Right Ventricle Normal cavity size and global systolic function. Right Atrium Mildly dilated right atrium. Interatrial Septum There was a moderate left to right shunt, shown on color Doppler. Aortic Valve Trileaflet valve structure and no stenosis. There is leaflet calcification with reduced excursion. Aortic valve mean gradient is 17 mmHg. Aortic valve area is 1.4 cm2. Moderate aortic valve regurgitation. There is no vegetation on the aortic valve. Mitral Valve No stenosis. Mitral valve non-specific thickening. Mild to moderate regurgitation. There is no vegetation on the mitral valve. Tricuspid Valve Normal valve structure and no stenosis. Mild to moderate regurgitation. There is no vegetation on the tricuspid valve. Pulmonic Valve Pulmonic valve not well visualized. Aorta Normal aortic root. Pulmonary Artery Pulmonary arterial systolic pressure (PASP) is 55 mmHg. Pulmonary hypertension found to be moderate. Pericardium Small posterior pericardial effusion noted.  No indications of tamponade present. Interpretation Summary · Normal cavity size and wall thickness. Mild-to-moderate systolic function. Estimated left ventricular ejection fraction is 40 - 45%. Abnormal left ventricular wall motion. Age-appropriate left ventricular diastolic function. · Moderately dilated left atrium. · Mildly dilated right atrium. · Color flow Doppler was used. · Aortic valve leaflet calcification present with reduced excursion. Aortic valve mean gradient is 17 mmHg. Aortic valve area is 1.4 cm2. Moderate aortic valve regurgitation is present. · Mitral valve non-specific thickening. Mild to moderate mitral valve regurgitation is present. · Mild to moderate tricuspid valve regurgitation is present. · Moderate pulmonary hypertension. Pulmonary arterial systolic pressure is 55 mmHg. · Small posterior pericardial effusion. CXR 4/19/20 PA and lateral views demonstrate normal heart size. Persistent mild diffuse 
interstitial prominence again noted. Osseous metastatic disease is unchanged. Port-A-Cath is able in position. Calcified granuloma again noted in the right 
apex. Old left rib fractures. 
  
IMPRESSION Impression: Persistent mild diffuse interstitial prominence again noted and 
grossly unchanged compared to 12/31/2019. Stable osseous metastatic disease.

## 2020-05-01 NOTE — PROGRESS NOTES
VV 
 
Identified pt with two pt identifiers(name and ). Reviewed record in preparation for visit and have obtained necessary documentation. All patient medications has been reviewed. Chief Complaint Patient presents with  Transitions Of Care 2020 - 2020 (8 days) [facility] Health Maintenance Due Topic  Foot Exam Q1   
 Eye Exam Retinal or Dilated  DTaP/Tdap/Td series (1 - Tdap)  Shingrix Vaccine Age 50> (1 of 2)  GLAUCOMA SCREENING Q2Y   
 MICROALBUMIN Q1   
 Lipid Screen Vitals:  
 20 1125 BP: 113/59 Temp: 98.2 °F (36.8 °C) Weight: 128 lb 11.2 oz (58.4 kg) PainSc:   0 - No pain Wt Readings from Last 3 Encounters:  
20 128 lb 11.2 oz (58.4 kg) 20 138 lb 14.2 oz (63 kg) 20 127 lb (57.6 kg) Temp Readings from Last 3 Encounters:  
20 98.2 °F (36.8 °C)  
20 98.2 °F (36.8 °C)  
20 99 °F (37.2 °C) (Temporal) BP Readings from Last 3 Encounters:  
20 113/59  
20 110/60  
20 110/60 Pulse Readings from Last 3 Encounters:  
20 82  
20 75  
20 75 Lab Results Component Value Date/Time Hemoglobin A1c 6.7 (H) 2019 04:40 AM  
 Hemoglobin A1c (POC) 7.2 2012 04:16 PM  
 
 
Coordination of Care Questionnaire:  
1) Have you been to an emergency room, urgent care, or hospitalized since your last visit? yes 2020 - 2020 (8 days) Good Jain 
Dx: UTI 2. Have seen or consulted any other health care provider since your last visit? NO 
 
3) Do you have an Advanced Directive/ Living Will in place? YES If yes, do we have a copy on file YES If no, would you like information NO Patient is accompanied by son Austen Perez) I have received verbal consent from Cyn Aguilar to discuss any/all medical information while they are present in the room.

## 2020-05-01 NOTE — Clinical Note
Renzo Morgan am trying to reach Dr Bryanna Shaw from [facility].  We need to check wt him before line is removed on 5/7  If they need line for chemo, we should not have home health remove and they will need further orders after 5/7 for picc line from oncology.   If they don't need line for chemo, then dc 5/7

## 2020-05-04 NOTE — TELEPHONE ENCOUNTER
Two pt identifiers confirmed. Spoke to Mehdi Disla [facility]) and left message again to have Dr. Tamiko Stroud staff contact me ASAP. Mehdi Disla verbalized understanding of information discussed w/ no further questions at this time.

## 2020-05-05 NOTE — TELEPHONE ENCOUNTER
----- Message from Nasir Peterson 2906 sent at 5/5/2020  1:52 PM EDT -----  Regarding: dr Willard Back telephone  Patient return call    Caller's first and last name and relationship (if not the patient): osman from [address] [place] [address],[facility] contact number(s): [phone]      Whose call is being returned: Jennifer      Details to clarify the request:      Nasir Peterson [id number]

## 2020-05-05 NOTE — TELEPHONE ENCOUNTER
Two pt identifiers confirmed. Spoke to Nii Field [facility]) and left [facility] [place] to have Dr. Jimi Nair staff contact me or fax me a response with labs ASAP. Nii Field verbalized understanding of information discussed w/ no further questions at this time.

## 2020-05-05 NOTE — TELEPHONE ENCOUNTER
Two pt identifiers confirmed. Spoke to Michael in regards to receiving ok per Dr. Octaviano Dougherty to remove PICC line 05/07. Labs are being faxed to office. Michael verbalized understanding of information discussed w/ no further questions at this time.

## 2020-05-06 NOTE — PROGRESS NOTES
Arben Narayanan 
82 y.o. female 1935 
[address] [facility] [address] 
[id number] BONNIE Daly [address] Encounter Date: 5/6/2020 Established Patient Visit Note: Marisol Mcgowan MD 
 
Reason for Appointment: 
Chief Complaint Patient presents with  
[facility] Follow Up  
   for UTI  Lethargy  
  in the morning History of Present Illness: 
History provided by Valeda Opitz (son), Patient Arben Narayanan is a 82 y.o. female who presents today for: 
 
Hospital Follow Up for UTI Admit Date: 4/19/2020 Discharge Date: 4/27/2020 Diagnosis: Sepsis 2/2  Klemsiella bacteremia in setting of UTI Hospital Course:  
Patient presented to hospital with fever, weakness, fatigue, dysuria, and urinary urgency. She was found to have Sepsis in setting of UTI and Klebsiella bacteremia. Patient was treated with IV antibiotics  Her condition improved and she was discharged with Ertapenem through PICC through 5/7/2020. Medication Changes on Discharge: 
-Ertapenem through PICC 
- Her son reports that her lasix was alternating 40 and 80mg daily, but this has been reduced to 40mg daily Follow up labs/tests: 
- Patient is having labs monitored by ID with last labs on 5/4/2020. CBC did show Hgb decreased from 8.8 on 4/25/2020 to 7.9 on 5/4/2020 Symptoms Since Discharge:  
Her son reports that she has been weak in the morning. He reports that she normally goes to rehab after a hospital visit, and he believes that this may be contributing to her symptoms. He reports that in the last afternoon to the early evening her strength improves. Speaking with the patient she reports that she is doing okay, but she endorses feeling weak and tired, but otherwise feels okay. She is getting home-based PT, OT, and nursing care. Her son states that he feels that she would improve with more frequent therapy. Specialist Followup:  
- Infectious Disease: Patient scheduled to follow up next Friday - Oncology: saw last week and getting CT and blood work next week - Cardiology: (Dr. Ramon Stephens): apt scheduled for next week Review of Systems Review of Systems Constitutional: Negative for chills and fever. Respiratory: Negative for cough, shortness of breath and wheezing. Cardiovascular: Negative for chest pain and palpitations. Allergies: Statins-hmg-coa reductase inhibitors and Sulfa (sulfonamide antibiotics) Medications: (Updated to reflect final medication list after visit) Current Outpatient Medications:  
  teriparatide (Forteo) 20 mcg/dose - 600 mcg/2.4 mL pnij injection, Forteo 20 mcg/dose (600 mcg/2.4 mL) subcutaneous pen injector  inject 0.08 milliliter (20 mcg) by subcutaneous route once daily into the thigh or abdominal wall, Disp: , Rfl:  
  palbociclib (Ibrance) 125 mg cap, Ibrance 125 mg capsule  take 1 capsule (125 mg) by oral route once daily for 21 consecutive days, followed by 7 days off, Disp: , Rfl:  
  SODIUM CHLORIDE 0.9 %, FLUSH, INJECTION, 5-10 mL by IntraCATHeter route daily. , Disp: , Rfl:  
  cholecalciferol (Vitamin D3) 25 mcg (1,000 unit) cap, Take 1,000 Units by mouth daily. , Disp: , Rfl:  
  omega 3-dha-epa-fish oil 120-180-600 mg cap, Take 600 mg by mouth daily. , Disp: , Rfl:  
  ertapenem (INVANZ) 1 gram solr injection, 1 g by IntraVENous route daily. , Disp: , Rfl:  
  glucosam/wesley-msm1/C/bassam/bosw (OSTEO BI-FLEX TRIPLE STRENGTH PO), Take 2 Tabs by mouth daily. , Disp: , Rfl:  
  lecithin, soy 1,200 mg cap, Take 1 Cap by mouth daily. , Disp: , Rfl:  
  furosemide (Lasix) 40 mg tablet, Take 40 mg by mouth daily. , Disp: , Rfl:  
  evolocumab (Repatha SureClick) pen injection, 140 mg by SubCUTAneous route every fourteen (14) days. , Disp: , Rfl:  
  clopidogreL (Plavix) 75 mg tab, Take 75 mg by mouth daily. , Disp: , Rfl:  
  carvediloL (COREG) 12.5 mg tablet, TAKE ONE TABLET BY MOUTH TWICE A DAY (Patient taking differently: Take 6.25 mg by mouth two (2) times a day.), Disp: 60 Tab, Rfl: 5 
  riTUXimab in 0.9% sodium chloride solution, 1,000 mg by IntraVENous route every 6 months., Disp: , Rfl:  
  insulin aspart U-100 (NOVOLOG FLEXPEN U-100 INSULIN) 100 unit/mL (3 mL) inpn, by SubCUTAneous route Before breakfast, lunch, dinner and at bedtime. 2-4 units PER SLIDING SCALE , Disp: , Rfl:  
  multivitamin (ONE A DAY) tablet, Take 1 Tab by mouth daily. , Disp: , Rfl:  
  nitroglycerin (NITROSTAT) 0.4 mg SL tablet, 1 Tab by SubLINGual route as needed for Chest Pain. Up to 3 doses. (Patient taking differently: 1 Tab by SubLINGual route every five (5) minutes as needed for Chest Pain. Up to 3 doses.), Disp: 40 Tab, Rfl: 0 
  levothyroxine (SYNTHROID) 88 mcg tablet, Take 88 mcg by mouth Daily (before breakfast). , Disp: , Rfl:  
  aspirin delayed-release 81 mg tablet, Take 81 mg by mouth daily. , Disp: , Rfl:  
  OMEGA-3 FATTY ACIDS/FISH OIL (OMEGA 3 FISH OIL PO), Take 600 mg by mouth daily. , Disp: , Rfl:  
  insulin degludec (TRESIBA FLEXTOUCH U-100) 100 unit/mL (3 mL) inpn, 14 Units by SubCUTAneous route daily. , Disp: , Rfl:  
 
History Patient Care Team: 
Rishi Sotelo MD as PCP - [facility]) Rishi Sotelo MD as PCP - [address] [place] [address] Provider Sea Ponce MD (Cardiology) Brandt Yeh MD as Physician (Hematology and Oncology) Sridevi Green MD as Physician (Nephrology) Inge Faust MD as Physician (Endocrinology) Renee Styles MD (Pulmonary Disease) Mary Lee MD (Cardiology) Jules Cifuentes, ARIANA as Care Transitions Nurse (Internal Medicine) Past Medical History: she has a past medical history of Acute on chronic systolic HF (heart failure) ([address] (5/19/2018), Age-related osteoporosis without current pathological fracture (9/2/2009), Anemia (9/2/2009), Asymptomatic hyperuricemia (2/16/2017), [facility] CAD (coronary artery disease) (6/21/2018), Carotid bruit (8/31/2011), CHF (congestive heart failure) ([facility] CKD (chronic kidney disease) stage 3, GFR 30-59 ml/min ([facility] (10/25/2017), Colon cancer ([facility] (9/2/2009), Colon cancer ([facility] (9/2/2009), DM (diabetes mellitus) ([facility] (9/2/2009), GERD (gastroesophageal reflux disease), H/O: CVA (9/2/2009), Heart attack ([facility] HTN, goal below 140/90 (9/2/2009), Hypothyroid (9/2/2009), Ill-defined condition, Long-term use of immunosuppressant medication (11/21/2016), Metastatic breast cancer ([facility] (6/1/2018), Microalbuminuria (9/2/2009), Osteoporosis (9/2/2009), Other and unspecified hyperlipidemia (1/27/2010), PAD (peripheral artery disease) ([facility] (9/7/2011), Primary osteoarthritis of both knees (9/28/2016), Rheumatoid arthritis involving ankle ([facility] (9/28/2016), Rheumatoid arthritis(714.0) (9/2/2009), Seropositive rheumatoid arthritis of multiple sites [facility] (9/28/2016), Statin intolerance (12/21/2016), Type 2 diabetes mellitus with neurologic complication, with long-term current use of insulin ([facility] (9/2/2009), Type 2 diabetes with nephropathy ([facility] (8/20/2018), Unspecified essential hypertension (9/2/2009), Vitamin D deficiency (6/16/2017), and Weakness due to cerebrovascular accident. Past Surgical History: she has a past surgical history that includes hx colectomy; hx hysterectomy; and hx other surgical. 
 
Family Medical History: family history includes Cancer in her father; Diabetes in her brother, mother, sister, and sister; Other in her sister; Stroke in her mother. Social History: she reports that she has never smoked. She has never used smokeless tobacco. She reports that she does not drink alcohol or use drugs. Objective:  
Vital Signs There were no vitals taken for this visit. Unable to obtain full set of vital signs d/t virtual visit Physical Exam 
Constitutional:   
   Appearance: She is well-groomed and normal weight. Eyes: General: Lids are normal. Vision grossly intact. Gaze aligned appropriately. Conjunctiva/sclera:  
   Right eye: Right conjunctiva is not injected. Left eye: Left conjunctiva is not injected. Neck: Musculoskeletal: Full passive range of motion without pain and normal range of motion. Pulmonary:  
   Effort: Pulmonary effort is normal. No tachypnea, bradypnea, accessory muscle usage or respiratory distress. Skin: 
   Coloration: Skin is not ashen, cyanotic, jaundiced or mottled. Neurological:  
   General: No focal deficit present. Mental Status: She is alert. Mental status is at baseline. Psychiatric:     
   Attention and Perception: Attention and perception normal.     
   Mood and Affect: Mood and affect normal.     
   Speech: Speech normal.     
   Behavior: Behavior normal. Behavior is cooperative. Assessment & Plan: ICD-10-CM ICD-9-CM 1. Encounter for support and coordination of transition of care Z71.89 V65.49 IA DISCHARGE MEDS RECONCILED W/ CURRENT OUTPATIENT MED LIST 2. Bacteremia due to Klebsiella pneumoniae R78.81 790.7   
  041.3 3. Fatigue, unspecified type R53.83 780.79   
4. Anemia, unspecified type D64.9 285.9 5. Metastatic breast cancer (HCC) C50.919 174.9 6. Acute on chronic systolic HF (heart failure) ([facility]) I50.23 428.23   
 
-MOY for sepsis 2/2 Klebisella bacteremia; improved with antibiotics scheduled for completion on 5/7/2020. ID following - Fatigue: Patient with self-report and by son with fatigue since discharge. Review of ID labs does show that patient's Hgb has decreased from 8.8 on 4/25/2020 to 7.9 on 5/4/2020. Recommend that patient follow up with her hematologist to discuss this further. Continue home based PT, OT, and home health - Breast Cancer, managed by Hematology/Oncology - Acute on Chronic systolic CHF: Review of chart shows that patient had been alternating Lasix 40mg and 80mg daily, but patient's son reports that this has been reduced to 40mg per day only. Follow up with cardiology as scheduled I was in the office while conducting this encounter. Consent: 
She and/or her healthcare decision maker is aware that this patient-initiated Telehealth encounter is a billable service, with coverage as determined by her insurance carrier. She is aware that she may receive a bill and has provided verbal consent to proceed: Yes This virtual visit was conducted via [employer]. Pursuant to the emergency declaration under the [facility], [facility] waiver authority and the [employer] Act, this Virtual  Visit was conducted to reduce the patient's risk of exposure to COVID-19 and provide continuity of care for an established patient. Services were provided through a video synchronous discussion virtually to substitute for in-person clinic visit. Due to this being a TeleHealth evaluation, many elements of the physical examination are unable to be assessed. Total Time: minutes: 21-30 minutes. I have discussed the diagnosis with the patient and the intended plan as seen in the above orders. The patient has received an after-visit summary along with patient information handout. I have discussed medication side effects and warnings with the patient as well. Disposition Follow-up and Dispositions · Return in about 4 weeks (around 6/3/2020).  
  
 
 
 
Katy Abreu MD

## 2020-05-06 NOTE — PROGRESS NOTES
Chief Complaint Patient presents with  
[facility] Follow Up  
   for UTI  Lethargy  
  in the morning 1. Have you been to the ER, urgent care clinic since your last visit? Hospitalized since your last visit? Patient was [facility] 4/19 
 
2. Have you seen or consulted any other health care providers outside of the [address] [place] [address] since your last visit? Include any pap smears or colon screening.

## 2020-05-07 NOTE — TELEPHONE ENCOUNTER
Son thinks mother has another UTI. Weakness in legs, poor balance, nurse noticed cloudy urine. Requesting another culture. Pt has had 2 episodes of sepsis.

## 2020-05-07 NOTE — TELEPHONE ENCOUNTER
----- Message from Sheila Carlisle sent at 5/7/2020  2:42 PM EDT -----  Regarding: Dr. Antonio Iyer Message/Vendor Calls    Caller's first and last name:  Rosario Milligan, Nurse with [facility]      Reason for call:  Ms. Sneha Hernández stated the patient has just finished the antibiotic she was taking for UTI. Ms. Sneha Hernández would like to know if the office usually does a follow-up ua, and if so, when should patient be scheduled to do that? Callback required yes/no and why:  Yes. Ms. Sneha Hernández would like to know if patient needs to have follow-up ua after finishing antibiotic.       Best contact number(s):  [phone]      Details to clarify the request:      Sheila Carlisle

## 2020-05-08 NOTE — TELEPHONE ENCOUNTER
Two pt identifiers confirmed. Informed Ramya per Dr. Shay Rodriguez:     Veroncia Brito, DO Aleja Gomes   Caller: Unspecified Ariana العراقي,  2:38 PM)             Does not need UA /culture  unless symptomatic     Picc should have been removed      Mary Martinez states, son refused to have PICC line removed 5/7. Mary Martinez states, sent a nurse to pt to have PICC line removed today. Mary Martinez verbalized understanding of information discussed w/ no further questions at this time.

## 2020-05-08 NOTE — TELEPHONE ENCOUNTER
Two pt identifiers confirmed. Neri Euceda (son) states, mother is weak in legs. Most recent labs show hgb levels are low and he is concerned. OT informed son that mother's urine is cloudy and may be needing a urine culture. Son states, he was aware of this a couple day ago. He just now contacted office on this matter. Son states, mother is unbalance and is having frequent urination. He would like to have a lab order place for UA/urine culture before [address] gets to mom. He keeps denying to have PICC line removed due to mother's sx of a UTI and possible need of more antibiotics. Informed son will send this off to provider right away. Son is very complicated to please and communicate with. Son verbalized understanding of information discussed w/ no further questions at this time.

## 2020-05-08 NOTE — PROGRESS NOTES
I called the son twice on his mobile and goes to   I called the home number listed as well and goes to   Ms Main Pittman has been on IV antibiotics and if feeling worse wt dysria, etc needs assessed for UA/culture as well as for possible yeast infection as she has been on IV antibiotics   She stillhas a picc that was scheduled to be removed and noted that son refusing this  I am not able to contact them after multiple attempts today  If fever, chills, weakness, etc needs to be seen/go to the ER              I was able to reach Ms Davis Luciana, home health RN who is seeing patient today   Per discussion with nurse, patients vitals are     98.7 temp  /68  HR 70         patient reported frequency in urination, cloudy urine and intermittent confusion and weakness to Nurse    I have asked the nurse to check urine culture  DC picc as planned   Will follow cultures   If hemodynamically unstable, worsening, weakness confusion, need to go to the hospital     Gautam Webb DO  3:33 PM

## 2020-05-13 NOTE — TELEPHONE ENCOUNTER
Issues with pt urine and urinating. Urine is cloudy and pt having trouble voiding.  Son wants to know what the results were from her recent culture

## 2020-05-13 NOTE — TELEPHONE ENCOUNTER
Two pt identifiers confirmed. Spoke to Oren and informed son per Dr. Colonel Wolf down below:     DO Juana Madison   Caller: Unspecified (Today, 12:13 PM)             Has a follow up coming up   Urine culture was not impressive for UTI      Robin verbalized understanding of information discussed w/ no further questions at this time.

## 2020-05-13 NOTE — PROGRESS NOTES
Maria Guadalupe Yarbrough is a 82 y.o. female evaluated via telephone on 5/13/2020. Consent: 
She and/or health care decision maker is aware that that she may receive a bill for this telephone service, depending on her insurance coverage, and has provided verbal consent to proceed: Yes 6/18 echo lvef 30%, trivial pericardial effusion 12/25/19 mildly dilated lv with lvef 30-35%, lae, mod mr, mild to mod as Documentation: Pt was contacted regarding appointment rescheduling and they requested a telephone appointment. Details of this discussion including any medical advice provided: Ms. Marielena Mims was recently in the hospital urosepsis and was very weak after she got home and is just now starting to get a little bit of her strength back. Her blood pressure was running low and her weight was stable so her son cut her Coreg and her Lasix doses in half and she seems to be doing just fine with that. Her urine is still quite dark and they checked her for UTI that was negative so I suggested he try to push a little bit more fluids gently over the next couple of days. She is going to be restaged with her cancer later this week and currently is on a chemotherapy holiday. She denies any shortness of breath or leg swelling. Ms. Donta Tapia appears to be fairly well compensated at this point on a reasonable medical regimen. I told her son he could make small changes going forward with particular with her diuretic if her weight starts to go up she gets short of breath or her legs swell. current treatment plan is effective, no change in therapy 
lab results and schedule of future lab studies reviewed with patient 
reviewed diet, exercise and weight control I affirm this is a Patient Initiated Episode with an Established Patient who has not had a related appointment within my department in the past 7 days or scheduled within the next 24 hours. Total Time: minutes: 11-20 minutes Note: not billable if this call serves to triage the patient into an appointment for the relevant concern Yamila Shafer MD

## 2020-05-22 NOTE — TELEPHONE ENCOUNTER
Two pt identifiers confirmed. Son states, mom is doing fine. PICC line was removed. Per Dr. Webb Daily if there is no questions or concerns f/u is not necessary. Son states, no questions or concerns. Michael Johnson (son) verbalized understanding of information discussed w/ no further questions at this time.

## 2020-05-26 NOTE — ACP (ADVANCE CARE PLANNING)
Advance Care Planning Advance Care Planning Clinical Specialist 
Conversation Note Date of ACP Conversation: 05/26/20 Conversation Conducted with:  Son,   Primary Decision Maker: Abebe Postal - Son - [phone] ACP Clinical Specialist: Mohan Rosenberg RN Health Care Decision Maker: 
 
Current Designated Health Care Decision Maker:   Primary Decision Maker: Ne Pichardo Son - [phone] ( 
If no Decision Maker listed above or available through scanned documents, then: 
 
 
Care Preferences Hospitalization: \"If your health worsens and it becomes clear that your chance of recovery is unlikely, what would your preference be regarding hospitalization? \" Choice: 
[x]  The patient wants hospitalization 
[]  The patient prefers comfort-focused treatment without hospitalization. Ventilation: \"If you were in your present state of health and suddenly became very ill and were unable to breathe on your own, what would your preference be about the use of a ventilator (breathing machine) if it were available to you? \"   NO If patient would desire the use of a ventilator (breathing machine) NO \"If your health worsens and it becomes clear that your chance of recovery is unlikely, what would your preference be about the use of a ventilator (breathing machine) if it were available to you? \"   NO Would the patient desire the use of a ventilator (breathing machine)? NO Resuscitation \"CPR works best to restart the heart when there is a sudden event, like a heart attack, in someone who is otherwise healthy. Unfortunately, CPR does not typically restart the heart for people who have serious health conditions or who are very sick. \" \"In the event your heart stopped as a result of an underlying serious health condition, would you want attempts to be made to restart your heart  No 
 
 
 
[x] Yes  [] No   Educated Patient / Decision Maker regarding differences between Advance Directives and portable DNR orders. Length of ACP Conversation in minutes:   
 
Conversation Outcomes: 
[] ACP discussion completed 
[] Existing advance directive reviewed with patient; no changes to patient's previously recorded wishes 
 [] New Advance Directive completed 
 [] Portable Do Not Resuscitate prepared for Provider review and signature 
[] POLST/POST/MOLST/MOST prepared for Provider review and signature Follow-up plan:   
[x] Schedule follow-up conversation to continue planning 
[] Referred individual to Provider for additional questions/concerns [x] Advised patient/agent/surrogate to review completed ACP document and update if needed with changes in condition, patient preferences or care setting  
 
[x] This note routed to one or more involved healthcare providers Lorne Krause, RN, BSN, [facility] Care Management [phone]

## 2020-05-26 NOTE — PROGRESS NOTES
Date of previous inpatient admission/ ED visit? 4/19/20-4/27/20  UTI What brought the patient back to ED? Fever Did patient decline recommended services during last admission/ ED visit (if yes, what)? No, open to [address] Has patient seen a provider since their last inpatient admission/ED visit (if yes, when)? Yes transition of care appointment with PCP and Cardiology PCP: First and Last name:      Dr. Jakub Goldsmith Name of Practice:    [facility] Are you a current patient: Yes/No:    Yes Approximate date of last visit:    5/1/20 CM Interventions: 
 
Care Management Interventions PCP Verified by CM: Yes(Dr. Gallo Timmons) Last Visit to PCP: 01/20/20 Palliative Care Criteria Met (RRAT>21 & CHF Dx)?: No 
Mode of Transport at Discharge: (Son to transport home  iYnka Diaz [phone]) Transition of Care Consult (CM Consult): Discharge Planning(Core Measure, open to [facility] ) MyChart Signup: No 
Discharge Durable Medical Equipment: No(Rolling walker and CPAP at home) Health Maintenance Reviewed: Yes Physical Therapy Consult: No 
Occupational Therapy Consult: No 
Speech Therapy Consult: No 
Current Support Network: Lives with Caregiver(Lives with son, personal care/aide in the home daily) Confirm Follow Up Transport: Family(Son transports to and from MD appointments) Discharge Location Discharge Placement: Home with home health(Anticipate discharge plan will be home with home health) Jonathan Wallace is a 82year old female to [facility] [place] ED with complaints of fever, she wants to be evaluated for fever. Potential readmission, noted admission 4/19-4/27. Baseline Ms. Ledesma uses walker in the home and has personal care aides to help with ADL's during day. Son didn't know the name of the agency, noted she is open to home health care with [facility]. ED work up pending, cardiology consult.   CM Spoke with son to discuss CM role and review advance directives, he states he has given these documents to us several times. Informed that I don't see they were scanned and want the appropriate care for her if her condition deteriorated. Verified face sheet and demographic. Isaac Mendoza, RN, BSN, [place] 
ED Care Management [phone]

## 2020-05-26 NOTE — ED NOTES
RN spoke with patients son who reports patient took tylenol this morning. Pt's son reports temp at home 100.0-101.0 today. Son would like to be called when patient evaluation has been completed.

## 2020-05-26 NOTE — CONSULTS
Cardiology Consult Note Patient Name: Zoraida Yusuf  : 1935 MRN: [id number] Date: 2020  Time: 1:38 PM 
 
Admit Diagnosis: No admission diagnoses are documented for this encounter. Primary Cardiologist: Sera Naik MD   Consulting Cardiologist: Lelon Spatz, MD 
 
Reason for Consult: elevated troponin Requesting MD: Soledad Feliciano 
 
HPI: 
Zoraida Yusuf is a 82 y.o. female admitted on 2020  for No admission diagnoses are documented for this encounter. .   has a past medical history of Acute on chronic systolic HF (heart failure) ([address] (2018), Age-related osteoporosis without current pathological fracture (2009), Anemia (2009), Asymptomatic hyperuricemia (2017), Breast cancer ([address] CAD (coronary artery disease) (2018), Carotid bruit (2011), CHF (congestive heart failure) ([address] CKD (chronic kidney disease) stage 3, GFR 30-59 ml/min ([address] (10/25/2017), Colon cancer ([address] (2009), Colon cancer ([address] (2009), DM (diabetes mellitus) ([address] (2009), GERD (gastroesophageal reflux disease), H/O: CVA (2009), Heart attack ([address] HTN, goal below 140/90 (2009), Hypothyroid (2009), Ill-defined condition, Long-term use of immunosuppressant medication (2016), Metastatic breast cancer ([address] (2018), Microalbuminuria (2009), Osteoporosis (2009), Other and unspecified hyperlipidemia (2010), PAD (peripheral artery disease) ([address] (2011), Primary osteoarthritis of both knees (2016), Rheumatoid arthritis involving ankle ([address] (2016), Rheumatoid arthritis(714.0) (2009), Seropositive rheumatoid arthritis of multiple sites ([facility] (2016), Statin intolerance (2016), Type 2 diabetes mellitus with neurologic complication, with long-term current use of insulin ([facility] (2009), Type 2 diabetes with nephropathy ([facility] (8/20/2018), Unspecified essential hypertension (9/2/2009), Vitamin D deficiency (6/16/2017), and Weakness due to cerebrovascular accident. Presents to the ED for fever. She is sitting in the ED hallway. Masked. She is a good historian. She awoke this am with a temperature of 102. Recheck was 101. She did take Tylenol prior to arrival.  She does state she has cough, with a little congestion. She has no complaints for chest pain or SOB, at this time. Recent follow up with Dr. Maged Rosales on 5/13/20. Recent hospital discharge for urosepsis. Continues on chemotherapy for BrCa. Subjective: 
Sitting in wheel chair in ED. No c/o CP or SOB. She notes fevers and feeling weak. Assessment and Plan 1. Troponin elevation - Trivial to 0.13 
 - Asymptomatic - no CP or SOB 
 - EKG without ACS and unchanged from prior 
 - Non specific in this setting of known CAD, anemia, CKD, fevers and dehydration 2. Fever - UA sent 3. Chronic systolic HF 
 - compensated. 
 - NYHA class II 
04/19/20 ECHO ADULT COMPLETE 04/20/2020 4/20/2020 Narrative · Normal cavity size and wall thickness. Mild-to-moderate systolic  
function. Estimated left ventricular ejection fraction is 40 - 45%. Abnormal left ventricular wall motion. Age-appropriate left ventricular  
diastolic function. · Moderately dilated left atrium. · Mildly dilated right atrium. · Color flow Doppler was used. · Aortic valve leaflet calcification present with reduced excursion. Aortic valve mean gradient is 17 mmHg. Aortic valve area is 1.4 cm2. Moderate aortic valve regurgitation is present. · Mitral valve non-specific thickening. Mild to moderate mitral valve  
regurgitation is present. · Mild to moderate tricuspid valve regurgitation is present. · Moderate pulmonary hypertension. Pulmonary arterial systolic pressure is 55 mmHg. · Small posterior pericardial effusion. Signed by: Daphne Waddell MD  
 - [facility] Coreg - No ACE/ARB or Aldactone with CKD 4. CAD 
 - last cath 2018 Proximal LAD: There was a diffuse 90 % stenosis. The lesion was moderately calcified. Distal LAD: There was a diffuse 50 % stenosis. Ostial circumflex: There was a discrete 85 % stenosis. The lesion was hazy. Mid circumflex: There was a diffuse 70 % stenosis. 1st obtuse marginal: There was a discrete 75 % stenosis. Proximal RCA: There was a diffuse 85 % stenosis. Distal RCA: There was a tubular 90 % stenosis. Right PDA: There was a discrete 99 % stenosis at the ostium of the vessel segment. - Coreg, ASA, Plavix. No statin with allergy 5. CKD 
 - stage III, stable 6. Metastatic BrCa 
 - continue on chemotherapy Presents with c/o fevers and feeling weak. No c/o CP or SOB. Troponin elevation trivial and non specific in this current setting. H/o CHF and CAD. She needs no further cardiac testing or work up. She can discharge from the ED or hospital when ready. Will see again as needed. Cardiology Attending:Patient seen and examined. I agree with NP assessment and plans. She has severe diffuse CAD not a candidate for CABG, no chest pain, minimal troponin elevation in setting of CKD is not of clinical significance. Fatmata Mccarthy MD 5/26/2020 2:40 PM  
 
 
 
Patient Active Problem List  
Diagnosis Code  Type 2 diabetes mellitus with neurologic complication, with long-term current use of insulin ([facility]) E11.49, Z79.4  Colon cancer ([facility] C18.9  Age-related osteoporosis without current pathological fracture M81.0  
 HTN, goal below 140/90 I10  Anemia D64.9  Hyperlipidemia E78.5  Carotid bruit R09.89  
 PAD (peripheral artery disease) ([facility]) I73.9  Weakness due to cerebrovascular accident [id number]  Seropositive rheumatoid arthritis of multiple sites ([facility] M05.79  
 Primary osteoarthritis of both knees M17.0  Long-term use of immunosuppressant medication Z79.899  Statin intolerance Z78.9  Asymptomatic hyperuricemia E79.0  Vitamin D deficiency E55.9  CKD (chronic kidney disease) stage 3, GFR 30-59 ml/min ([facility]) N18.3  Acute on chronic systolic HF (heart failure) ([facility]) I50.23  
 Metastatic breast cancer ([facility] C50.919  
 CAD (coronary artery disease) I25.10  Type 2 diabetes with nephropathy ([facility]) E11.21  
 Dizziness R42  Septic shock ([facility]) A41.9, R65.21  
 UTI (urinary tract infection) N39.0  Systemic inflammatory response syndrome ([facility]) R65.10  Bacteremia due to Gram-negative bacteria R78.81  
 
6/18 echo lvef 30%, trivial pericardial effusion 12/25/19 mildly dilated lv with lvef 30-35%, lae, mod mr, mild to mod as 
 
 
Review of Systems:   
[] Patient unable to provide secondary to condition 
 
[x] All systems negative, except as checked below. Constitutional:   
[]Weight Change  [x]Fever   []Chills   []Night Sweats  [x]Fatigue  [x]Malaise  []____ 
ENT/Mouth:    
[]Hearing Changes  []Ear Pain  []Nasal Congestion   []Sinus Pain  []Hoarseness []Sore throat  []Rhinorrhea  []Swallowing Difficulty  []____ Eyes:   
[]Eye Pain  []Swelling  []Redness  []Foreign Body  []Discharge  []Vision Changes  []____ Cardiovascular:   
[]Chest Pain  []SOB  []PND  []REDDY  []Orthopnea  []Claudication  []Edema  
[]Palpitations  []____ Respiratory:   
[]Cough  []Sputum  []Wheezing,  []SOB  []Hemoptysis  []____ Gastrointestinal:   
[]Nausea  []Vomiting  []Diarrhea  []Constipation  []Pain  []Heartburn  []Anorexia []Dysphagia  []Hematochezia  []Melena,  []Jaundice  []____ Genitourinary:   
[]Dysuria  []Urinary Frequency  []Hematuria  []Urinary Incontinence  []Urgency []Flank Pain  []Hesitancy  []____ Musculoskeletal:   
[]Arthralgias  []Myalgias  []Joint Swelling  []Joint Stiffness  []Back Pain  []Neck Pain  []____ Skin:   
[]Skin Lesions  []Pruritis  []Hair Changes  []Skin rashes  []____ Neuro: []Weakness  []Numbness  []Paresthesias  []Loss of Consciousness  []Syncope  
[]Dizziness  []Headache  []Coordination Changes  []Recent Falls  []____ Psych:   
[]Anxiety/Depression  []Insomnia  []Memory Changes  []Violence/Abuse Hx.  []____ Heme/Lymph:   
[]Bruising  []Bleeding  []Lymphadenopathy  []____ Endocrine:   
[]Polyuria  []Polydipsia  []Temperature Intolerance  []____ Previous treatment/evaluation includes Cardiac catheterization, Stress echocardiogram 
Cardiac risk factors:  
sedentary life style, post-menopausal. 
 
Past Medical History:  
Diagnosis Date  Acute on chronic systolic HF (heart failure) ([facility] 5/19/2018  Age-related osteoporosis without current pathological fracture 9/2/2009  Anemia 9/2/2009  Asymptomatic hyperuricemia 2/16/2017  [facility]  CAD (coronary artery disease) 6/21/2018  Carotid bruit 8/31/2011  CHF (congestive heart failure) ([facility]  CKD (chronic kidney disease) stage 3, GFR 30-59 ml/min ([facility]) 10/25/2017  Colon cancer ([facility] 9/2/2009  
 surgery/chemo  Colon cancer ([facility] 9/2/2009  DM (diabetes mellitus) ([facility] 9/2/2009  GERD (gastroesophageal reflux disease)  H/O: CVA 9/2/2009  
 slight l sided weakness  Heart attack ([facility]  HTN, goal below 140/90 9/2/2009  Hypothyroid 9/2/2009  Ill-defined condition   
 seasonal allergies  Long-term use of immunosuppressant medication 11/21/2016  Metastatic breast cancer ([facility] 6/1/2018  Microalbuminuria 9/2/2009  Osteoporosis 9/2/2009  Other and unspecified hyperlipidemia 1/27/2010  PAD (peripheral artery disease) ([facility] 9/7/2011  Primary osteoarthritis of both knees 9/28/2016  Rheumatoid arthritis involving ankle ([facility] 9/28/2016  Rheumatoid arthritis(714.0) 9/2/2009  Seropositive rheumatoid arthritis of multiple sites ([facility] 9/28/2016  Statin intolerance 12/21/2016  Type 2 diabetes mellitus with neurologic complication, with long-term current use of insulin ([facility] 9/2/2009  Type 2 diabetes with nephropathy [facility] 8/20/2018  Unspecified essential hypertension 9/2/2009  Vitamin D deficiency 6/16/2017  Weakness due to cerebrovascular accident Past Surgical History:  
Procedure Laterality Date  HX COLECTOMY  HX HYSTERECTOMY  HX OTHER SURGICAL    
 colonoscopies numerous since 1993 No current facility-administered medications for this encounter. Current Outpatient Medications Medication Sig  cholecalciferol (Vitamin D3) 25 mcg (1,000 unit) cap Take 1,000 Units by mouth daily.  glucosam/wesley-msm1/C/bassam/bosw (OSTEO BI-FLEX TRIPLE STRENGTH PO) Take 2 Tabs by mouth daily.  lecithin, soy 1,200 mg cap Take 1 Cap by mouth daily.  furosemide (Lasix) 40 mg tablet Take 40 mg by mouth daily.  evolocumab (Repatha SureClick) pen injection 339 mg by SubCUTAneous route every fourteen (14) days.  clopidogreL (Plavix) 75 mg tab Take 75 mg by mouth daily.  carvediloL (COREG) 12.5 mg tablet TAKE ONE TABLET BY MOUTH TWICE A DAY (Patient taking differently: Take 6.25 mg by mouth two (2) times a day.)  insulin aspart U-100 (NOVOLOG FLEXPEN U-100 INSULIN) 100 unit/mL (3 mL) inpn by SubCUTAneous route Before breakfast, lunch, dinner and at bedtime. 2-4 units  multivitamin (ONE A DAY) tablet Take 1 Tab by mouth daily.  insulin degludec (TRESIBA FLEXTOUCH U-100) 100 unit/mL (3 mL) inpn 12 Units by SubCUTAneous route daily.  nitroglycerin (NITROSTAT) 0.4 mg SL tablet 1 Tab by SubLINGual route as needed for Chest Pain. Up to 3 doses. (Patient taking differently: 1 Tab by SubLINGual route every five (5) minutes as needed for Chest Pain. Up to 3 doses.)  levothyroxine (SYNTHROID) 88 mcg tablet Take 88 mcg by mouth Daily (before breakfast).  aspirin delayed-release 81 mg tablet Take 81 mg by mouth daily.  OMEGA-3 FATTY ACIDS/FISH OIL (OMEGA 3 FISH OIL PO) Take 600 mg by mouth daily. Allergies Allergen Reactions  Statins-Hmg-Coa Reductase Inhibitors Other (comments) Intolerant to statins  Sulfa (Sulfonamide Antibiotics) Other (comments)  
  syncope Family History Problem Relation Age of Onset  Diabetes Mother  Stroke Mother  Diabetes Sister  Other Sister   
     fell and hit her head -  of this  Diabetes Brother  Cancer Father   
     stomach  Diabetes Sister Social History Socioeconomic History  Marital status:  Spouse name: Not on file  Number of children: Not on file  Years of education: Not on file  Highest education level: Not on file Tobacco Use  Smoking status: Never Smoker  Smokeless tobacco: Never Used Substance and Sexual Activity  Alcohol use: No  
 Drug use: No  
 Sexual activity: Not Currently Partners: Male Objective: 
  Physical Exam 
 
Vitals:  
Vitals:  
 20 1113 BP: 130/67 Pulse: 94 Resp: 16 Temp: 99.2 °F (37.3 °C) SpO2: 97% General:    Alert, cooperative, no distress, appears stated age. Neck:   Supple, no carotid bruit and no JVD. Back:     Symmetric, normal curvature. Lungs:     Clear to auscultation bilaterally. Heart[de-identified]    Regular rate and rhythm, S1, S2 normal, no murmur, click, rub or gallop. Abdomen:     Soft, non-tender. Bowel sounds normal.   
Extremities:   Extremities normal, atraumatic, no cyanosis or edema. Vascular:   Pulses - 2+ DP Skin:   Skin color normal. No rashes or lesions Neurologic:   CN II-XII grossly intact. Telemetry:  
 
ECG:  
EKG Results Procedure 720 Value Units Date/Time EKG, 12 LEAD, INITIAL [[id number]] Collected:  20 1133 Order Status:  Completed Updated:  20 1139 Ventricular Rate 92 BPM   
  Atrial Rate 92 BPM   
  P-R Interval 160 ms QRS Duration 74 ms Q-T Interval 362 ms QTC Calculation (Bezet) 447 ms Calculated P Axis 63 degrees Calculated R Axis -3 degrees Calculated T Axis 114 degrees Diagnosis --  
  Normal sinus rhythm Septal infarct (cited on or before 25-DEC-2019) ST & T wave abnormality, consider lateral ischemia When compared with ECG of 27-DEC-2019 03:40, 
ST no longer depressed in Inferior leads Non-specific change in ST segment in Anterior leads Data Review:  
 
Radiology: XR Results (most recent): 
Results from [facility] Encounter encounter on 05/26/20 XR CHEST PA LAT Narrative EXAM:  XR CHEST PA LAT INDICATION:   weakness COMPARISON: 4/19/2020. FINDINGS: PA and lateral radiographs of the chest demonstrate lungs clear 
allowing for technique. Abdirashid Jimenez Heart size is borderline enlarged. There is extensive 
osseous bony metastatic disease. There are old rib fractures on the left. There 
is compression deformity T12 which is similar to the prior study. Abdirashid [place] Impression IMPRESSION: No acute process identified. There is extensive osseous metastatic 
disease. Recent Labs  
  05/26/20 
1148 TROIQ 0.13* Recent Labs  
  05/26/20 
1148 * K 3.8  CO2 24 BUN 31* CREA 1.22* * CA 8.9 Recent Labs  
  05/26/20 
1148 WBC 6.6 HGB 10.0* HCT 32.1*  
 Recent Labs  
  05/26/20 
1148 SGOT 29  
 No results for input(s): CHOL, LDLC in the last 72 hours. No lab exists for component: TGL, HDLC,  HBA1C No results for input(s): CRP, TSH, TSHEXT in the last 72 hours. No lab exists for component: ESR Jacy Clause. MASOUD Choi MD 
 
 
   [facility] [address] [place] [address], [address] [facility] 
   [phone] CC: Abi Sullivan MD

## 2020-05-26 NOTE — ED PROVIDER NOTES
Please note that this dictation was completed with [employer], the computer voice recognition software.  Quite often unanticipated grammatical, syntax, homophones, and other interpretive errors are inadvertently transcribed by the computer software.  Please disregard these errors.  Please excuse any errors that have escaped final proofreading. 
  
82-year-old female presents emergency room for evaluation of fever. Patient states she woke this morning and her temperature was 102. She rechecked her temperature and it was 101. Patient states she took Tylenol at 930. Patient denies any runny nose, congestion, cough, sore throat, muscle aches or body aches. No abdominal pain, nausea or vomiting. No dysuria frequency urgency. She denies any chest pain or shortness of breath. No known sick contacts. No known precipitating events. No other complaints at this time Social hx Nonsmoker No alcohol The history is provided by the patient. No  was used. Past Medical History:  
Diagnosis Date  Acute on chronic systolic HF (heart failure) ([address] 5/19/2018  Age-related osteoporosis without current pathological fracture 9/2/2009  Anemia 9/2/2009  Asymptomatic hyperuricemia 2/16/2017  Breast cancer ([address]  CAD (coronary artery disease) 6/21/2018  Carotid bruit 8/31/2011  CHF (congestive heart failure) ([address]  CKD (chronic kidney disease) stage 3, GFR 30-59 ml/min ([facility]) 10/25/2017  Colon cancer ([address] 9/2/2009  
 surgery/chemo  Colon cancer ([address] 9/2/2009  DM (diabetes mellitus) ([address] 9/2/2009  GERD (gastroesophageal reflux disease)  H/O: CVA 9/2/2009  
 slight l sided weakness  Heart attack ([address]  HTN, goal below 140/90 9/2/2009  Hypothyroid 9/2/2009  Ill-defined condition   
 seasonal allergies  Long-term use of immunosuppressant medication 11/21/2016  Metastatic breast cancer ([address] 6/1/2018  Microalbuminuria 9/2/2009  Osteoporosis 2009  Other and unspecified hyperlipidemia 2010  PAD (peripheral artery disease) ([facility] 2011  Primary osteoarthritis of both knees 2016  Rheumatoid arthritis involving ankle ([facility] 2016  Rheumatoid arthritis(714.0) 2009  Seropositive rheumatoid arthritis of multiple sites ([facility] 2016  Statin intolerance 2016  Type 2 diabetes mellitus with neurologic complication, with long-term current use of insulin ([facility] 2009  Type 2 diabetes with nephropathy ([facility] 2018  Unspecified essential hypertension 2009  Vitamin D deficiency 2017  Weakness due to cerebrovascular accident Past Surgical History:  
Procedure Laterality Date  HX COLECTOMY  HX HYSTERECTOMY  HX OTHER SURGICAL    
 colonoscopies numerous since  Family History:  
Problem Relation Age of Onset  Diabetes Mother  Stroke Mother  Diabetes Sister  Other Sister   
     fell and hit her head -  of this  Diabetes Brother  Cancer Father   
     stomach  Diabetes Sister Social History Socioeconomic History  Marital status:  Spouse name: Not on file  Number of children: Not on file  Years of education: Not on file  Highest education level: Not on file Occupational History  Not on file Social Needs  Financial resource strain: Not on file  Food insecurity Worry: Not on file Inability: Not on file  Transportation needs Medical: Not on file Non-medical: Not on file Tobacco Use  Smoking status: Never Smoker  Smokeless tobacco: Never Used Substance and Sexual Activity  Alcohol use: No  
 Drug use: No  
 Sexual activity: Not Currently Partners: Male Lifestyle  Physical activity Days per week: Not on file Minutes per session: Not on file  Stress: Not on file Relationships  Social connections Talks on phone: Not on file Gets together: Not on file Attends Rastafari service: Not on file Active member of club or organization: Not on file Attends meetings of clubs or organizations: Not on file Relationship status: Not on file  Intimate partner violence Fear of current or ex partner: Not on file Emotionally abused: Not on file Physically abused: Not on file Forced sexual activity: Not on file Other Topics Concern  Not on file Social History Narrative  Not on file ALLERGIES: Statins-hmg-coa reductase inhibitors and Sulfa (sulfonamide antibiotics) Review of Systems Constitutional: Positive for fever. Negative for chills. HENT: Negative for congestion and rhinorrhea. Respiratory: Negative for cough and shortness of breath. Cardiovascular: Negative for chest pain and palpitations. Gastrointestinal: Negative for abdominal pain, blood in stool, diarrhea, nausea and vomiting. Genitourinary: Negative for dysuria, flank pain, frequency and urgency. Musculoskeletal: Negative for arthralgias, myalgias, neck pain and neck stiffness. Skin: Negative for rash and wound. Neurological: Negative for dizziness, numbness and headaches. All other systems reviewed and are negative. Vitals:  
 05/26/20 1113 BP: 130/67 Pulse: 94 Resp: 16 Temp: 99.2 °F (37.3 °C) SpO2: 97% Physical Exam 
Vitals signs and nursing note reviewed. Constitutional:   
   Appearance: Normal appearance. HENT:  
   Head: Normocephalic and atraumatic. Nose: Nose normal. No congestion. Eyes:  
   Conjunctiva/sclera: Conjunctivae normal.  
   Pupils: Pupils are equal, round, and reactive to light. Neck: Musculoskeletal: Normal range of motion and neck supple. Cardiovascular:  
   Rate and Rhythm: Normal rate and regular rhythm. Pulmonary:  
   Effort: Pulmonary effort is normal.  
   Breath sounds: Normal breath sounds. Abdominal:  
   General: There is no distension. Palpations: There is no mass. Tenderness: There is no abdominal tenderness. There is no right CVA tenderness, left CVA tenderness, guarding or rebound. Hernia: No hernia is present. Musculoskeletal: Normal range of motion. Skin: 
   General: Skin is warm and dry. Neurological:  
   General: No focal deficit present. Mental Status: She is oriented to person, place, and time. Psychiatric:     
   Mood and Affect: Mood normal.     
   Behavior: Behavior normal.     
   Thought Content: Thought content normal.     
   Judgment: Judgment normal.  
 
  
 
MDM Number of Diagnoses or Management Options Acute cystitis without hematuria:  
Diagnosis management comments: Assessment and plan: This is an 77-year-old female presenting to the emergency room for evaluation of fever. She is afebrile here. Tylenol was 4 hours prior to arrival.  Abdomen is soft and nontender. Lungs are clear. She is no acute respiratory distress. She is not tachycardic tachypneic or hypoxic. No other complaints. Plan: Labs, EKG, x-ray been ordered from triage. Troponin is elevated at 0.13. Patient is not complaining of chest pain. No acute process on EKG. We will consult her cardiologist. 
 
4:52 PM 
Pt has been seen and evaluated by cardiology. No further cardiac testing needed. Pt can be discharged from ER. The patient has remained chest pain free while in the emergency department and is currently chest pain free. There are no associated symptoms. Patient is well hydrated, well appearing, and in no respiratory distress. Physical exam is reassuring, and without signs of serious illness. Urinalysis is consistent with uncomplicated UTI. Pt remains afebrile. No elevated wbc. Will discharge home on antibiotics, and f/u with PCP in 2-3 days.   Parents instructed to return with fevers, inability to void, vomiting, if pt is not tolerating antibiotics, or other concerning symptoms. Patient's results have been reviewed with them. Patient and/or family have verbally conveyed their understanding and agreement of the patient's signs, symptoms, diagnosis, treatment and prognosis and additionally agree to follow up as recommended or return to the Emergency Room should their condition change prior to follow-up. Discharge instructions have also been provided to the patient with some educational information regarding their diagnosis as well a list of reasons why they would want to return to the ER prior to their follow-up appointment should their condition change. Amount and/or Complexity of Data Reviewed Discuss the patient with other providers: yes (ER attendingAma) Patient Progress Patient progress: stable Procedures Pt case including HPI, PE, and all available lab and radiology results has been discussed with attending physician. Opportunity to evaluate patient has been provided to ER attending. Discharge and prescription plan has been agreed upon.

## 2020-05-26 NOTE — ED TRIAGE NOTES
Pt c/o fever of 100 since this am, denies sore throat, denies cough, denies cp or sob, denies any urinary symptoms, +little headache and feels a little weak, pt stated one of her \"aides\" had COVID

## 2020-05-26 NOTE — DISCHARGE INSTRUCTIONS
Omnicef:1 pill every 12 hours for 10 days. Frequent small sips for hydration. Return to ER for any return of fever, any nausea, vomiting, abdominal pain, chest pain, shortness of breath, inability to take antibiotics. Urinary Tract Infection in Women: Care Instructions  Your Care Instructions    A urinary tract infection, or UTI, is a general term for an infection anywhere between the kidneys and the urethra (where urine comes out). Most UTIs are bladder infections. They often cause pain or burning when you urinate. UTIs are caused by bacteria and can be cured with antibiotics. Be sure to complete your treatment so that the infection goes away. Follow-up care is a key part of your treatment and safety. Be sure to make and go to all appointments, and call your doctor if you are having problems. It's also a good idea to know your test results and keep a list of the medicines you take. How can you care for yourself at home? · Take your antibiotics as directed. Do not stop taking them just because you feel better. You need to take the full course of antibiotics. · Drink extra water and other fluids for the next day or two. This may help wash out the bacteria that are causing the infection. (If you have kidney, heart, or liver disease and have to limit fluids, talk with your doctor before you increase your fluid intake.)  · Avoid drinks that are carbonated or have caffeine. They can irritate the bladder. · Urinate often. Try to empty your bladder each time. · To relieve pain, take a hot bath or lay a heating pad set on low over your lower belly or genital area. Never go to sleep with a heating pad in place. To prevent UTIs  · Drink plenty of water each day. This helps you urinate often, which clears bacteria from your system. (If you have kidney, heart, or liver disease and have to limit fluids, talk with your doctor before you increase your fluid intake.)  · Urinate when you need to.   · Urinate right after you have sex. · Change sanitary pads often. · Avoid douches, bubble baths, feminine hygiene sprays, and other feminine hygiene products that have deodorants. · After going to the bathroom, wipe from front to back. When should you call for help? Call your doctor now or seek immediate medical care if:    · Symptoms such as fever, chills, nausea, or vomiting get worse or appear for the first time.     · You have new pain in your back just below your rib cage. This is called flank pain.     · There is new blood or pus in your urine.     · You have any problems with your antibiotic medicine.    Watch closely for changes in your health, and be sure to contact your doctor if:    · You are not getting better after taking an antibiotic for 2 days.     · Your symptoms go away but then come back. Where can you learn more? Go to http://david-noni.info/  Enter K793 in the search box to learn more about \"Urinary Tract Infection in Women: Care Instructions. \"  Current as of: August 21, 2019Content Version: 12.4  © 5429-8055 [employer]wise, Incorporated. Care instructions adapted under license by [employer] (which disclaims liability or warranty for this information). If you have questions about a medical condition or this instruction, always ask your healthcare professional. [address] any warranty or liability for your use of this information.

## 2020-05-27 NOTE — PROGRESS NOTES
Andrea Law 
82 y.o. female 1935 
[address] [id number] 
[id number] [address] Encounter Date: 5/27/2020 Established Patient Visit Note: Angela Rojas MD 
 
Reason for Appointment: 
No chief complaint on file. History of Present Illness: 
History provided by patient Andrea Law is a 82 y.o. female who presents today for ER follow up. Patient found to have a fever yesterday and taken to [facility] ER. Review of ER records shows that she had no acute process on CXR. CBC showed normal WBC and platelet. Hgb improved from last check. CMP with Creat of 1.22. UA positive for large LE and > 100 WBC. Preliminary review of urine culture consistent with > 100k GNR. She was placed on Cefdinir and discharged. She has not had any urinary symptoms this morning other than having cloudy urine. Her son reports that's her fever is improving with tylenol and antibiotics, but her temperature continues to be a little elevated. Review of Systems Review of Systems Constitutional: Positive for fever. Negative for chills. Respiratory: Negative for shortness of breath and wheezing. Cardiovascular: Negative for chest pain and palpitations. Allergies: Statins-hmg-coa reductase inhibitors and Sulfa (sulfonamide antibiotics) Medications: (Updated to reflect final medication list after visit) Current Outpatient Medications:  
  cefdinir (OMNICEF) 300 mg capsule, Take 1 Cap by mouth two (2) times a day for 10 days. , Disp: 20 Cap, Rfl: 0 
  cholecalciferol (Vitamin D3) 25 mcg (1,000 unit) cap, Take 1,000 Units by mouth daily. , Disp: , Rfl:  
  glucosam/wesley-msm1/C/bassam/bosw (OSTEO BI-FLEX TRIPLE STRENGTH PO), Take 2 Tabs by mouth daily. , Disp: , Rfl:  
  lecithin, soy 1,200 mg cap, Take 1 Cap by mouth daily. , Disp: , Rfl:  
  furosemide (Lasix) 40 mg tablet, Take 40 mg by mouth daily. , Disp: , Rfl:  
   evolocumab (Repatha SureClick) pen injection, 140 mg by SubCUTAneous route every fourteen (14) days. , Disp: , Rfl:  
  clopidogreL (Plavix) 75 mg tab, Take 75 mg by mouth daily. , Disp: , Rfl:  
  carvediloL (COREG) 12.5 mg tablet, TAKE ONE TABLET BY MOUTH TWICE A DAY (Patient taking differently: Take 6.25 mg by mouth two (2) times a day.), Disp: 60 Tab, Rfl: 5 
  insulin aspart U-100 (NOVOLOG FLEXPEN U-100 INSULIN) 100 unit/mL (3 mL) inpn, by SubCUTAneous route Before breakfast, lunch, dinner and at bedtime. 2-4 units , Disp: , Rfl:  
  multivitamin (ONE A DAY) tablet, Take 1 Tab by mouth daily. , Disp: , Rfl:  
  insulin degludec (TRESIBA FLEXTOUCH U-100) 100 unit/mL (3 mL) inpn, 12 Units by SubCUTAneous route daily. , Disp: , Rfl:  
  nitroglycerin (NITROSTAT) 0.4 mg SL tablet, 1 Tab by SubLINGual route as needed for Chest Pain. Up to 3 doses. (Patient taking differently: 1 Tab by SubLINGual route every five (5) minutes as needed for Chest Pain. Up to 3 doses.), Disp: 40 Tab, Rfl: 0 
  levothyroxine (SYNTHROID) 88 mcg tablet, Take 88 mcg by mouth Daily (before breakfast). , Disp: , Rfl:  
  aspirin delayed-release 81 mg tablet, Take 81 mg by mouth daily. , Disp: , Rfl:  
  OMEGA-3 FATTY ACIDS/FISH OIL (OMEGA 3 FISH OIL PO), Take 600 mg by mouth daily. , Disp: , Rfl:  
 
History Patient Care Team: 
Lenard Jimenez MD as PCP - [facility]) [facility] Provider Kvng Fernandez MD (Cardiology) Jaiden Mills MD as Physician (Hematology and Oncology) Vanda Dyer MD as Physician (Nephrology) Jenelle Blanca MD as Physician (Endocrinology) Davis Correa MD (Pulmonary Disease) Minerva Yadav MD (Cardiology) Sanket Page RN as Care Transitions Nurse (Internal Medicine) Dary Kirkland RN as Ambulatory Care Manager (General Practice) Past Medical History: she has a past medical history of Acute on chronic systolic HF ([facility]) [facility] (5/19/2018), Age-related osteoporosis without current pathological fracture (9/2/2009), Anemia (9/2/2009), Asymptomatic hyperuricemia (2/16/2017), Breast cancer ([facility] CAD (coronary artery disease) (6/21/2018), Carotid bruit (8/31/2011), CHF (congestive heart failure) ([facility] CKD (chronic kidney disease) stage 3, GFR 30-59 ml/min ([facility] (10/25/2017), Colon cancer ([facility] (9/2/2009), Colon cancer ([facility] (9/2/2009), DM (diabetes mellitus) ([facility] (9/2/2009), GERD (gastroesophageal reflux disease), H/O: CVA (9/2/2009), Heart attack ([facility] HTN, goal below 140/90 (9/2/2009), Hypothyroid (9/2/2009), Ill-defined condition, Long-term use of immunosuppressant medication (11/21/2016), Metastatic breast cancer ([facility] (6/1/2018), Microalbuminuria (9/2/2009), Osteoporosis (9/2/2009), Other and unspecified hyperlipidemia (1/27/2010), PAD (peripheral artery disease) ([facility] (9/7/2011), Primary osteoarthritis of both knees (9/28/2016), Rheumatoid arthritis involving ankle ([facility] (9/28/2016), Rheumatoid arthritis(714.0) (9/2/2009), Seropositive rheumatoid arthritis of multiple sites ([facility] (9/28/2016), Statin intolerance (12/21/2016), Type 2 diabetes mellitus with neurologic complication, with long-term current use of insulin ([facility] (9/2/2009), Type 2 diabetes with nephropathy ([facility] (8/20/2018), Unspecified essential hypertension (9/2/2009), Vitamin D deficiency (6/16/2017), and Weakness due to cerebrovascular accident. Past Surgical History: she has a past surgical history that includes hx colectomy; hx hysterectomy; and hx other surgical. 
 
Family Medical History: family history includes Cancer in her father; Diabetes in her brother, mother, sister, and sister; Other in her sister; Stroke in her mother. Social History: she reports that she has never smoked. She has never used smokeless tobacco. She reports that she does not drink alcohol or use drugs. Objective:  
Vital Signs There were no vitals taken for this visit. Unable to obtain full set of vital signs today as patient does not have all equipment for this at home Physical Exam 
Constitutional:   
   Appearance: Normal appearance. She is well-groomed and normal weight. Eyes:  
   General: Lids are normal. Vision grossly intact. Gaze aligned appropriately. Conjunctiva/sclera:  
   Right eye: Right conjunctiva is not injected. Left eye: Left conjunctiva is not injected. Neck: Musculoskeletal: Full passive range of motion without pain and normal range of motion. Pulmonary:  
   Effort: Pulmonary effort is normal. No tachypnea, bradypnea, accessory muscle usage or respiratory distress. Skin: 
   Coloration: Skin is not ashen, cyanotic, jaundiced or mottled. Neurological:  
   General: No focal deficit present. Mental Status: She is alert. Mental status is at baseline. Psychiatric:     
   Attention and Perception: Attention and perception normal.     
   Mood and Affect: Mood and affect normal.     
   Speech: Speech normal.     
   Behavior: Behavior normal. Behavior is cooperative. Assessment & Plan: ICD-10-CM ICD-9-CM 1. Recurrent UTI N39.0 599.0 REFERRAL TO UROLOGY Patient evaluated in ER for fever on 5/26/20 and found to have UTI. Discharged on [place]. Will continue treatment and provide referral to Urology for recurrent UTIs. Continue to monitor for improvement in symptoms. Follow up urine culture for speciation and sensitivities. Discussed reasons to call or go to ED. Patient's son is in agreement. Davin Edgar MD 
 
 
 
I was in the office while conducting this encounter. Consent: 
She and/or her healthcare decision maker is aware that this patient-initiated Telehealth encounter is a billable service, with coverage as determined by her insurance carrier. She is aware that she may receive a bill and has provided verbal consent to proceed:  Yes 
 
 This virtual visit was conducted via [employer]. Pursuant to the emergency declaration under the [facility], [facility] waiver authority and the [employer] Act, this Virtual  Visit was conducted to reduce the patient's risk of exposure to COVID-19 and provide continuity of care for an established patient. Services were provided through a video synchronous discussion virtually to substitute for in-person clinic visit. Due to this being a TeleHealth evaluation, many elements of the physical examination are unable to be assessed. Total Time: minutes: 11-20 minutes. I have discussed the diagnosis with the patient and the intended plan as seen in the above orders. The patient has received an after-visit summary along with patient information handout. I have discussed medication side effects and warnings with the patient as well. Disposition Follow-up and Dispositions · Return if symptoms worsen or fail to improve.  
  
 
 
 
Josie Caldera MD

## 2020-05-27 NOTE — PROGRESS NOTES
TOCED 2020:  Fever/Acute cystitis w/out hematuria Patient contacted regarding recent discharge and COVID-19 risk. Discussed COVID-19 related testing which was not done at this time. Test results were not done. Patient informed of results, if available? no   
Care Transition Nurse/ Ambulatory Care Manager contacted the caretaker son/ family by telephone to perform post discharge assessment. Verified name and  with patient's son as identifiers. Patient has following risk factors of: CAD. CTN/ACM reviewed discharge instructions, medical action plan and red flags related to discharge diagnosis. Reviewed and educated them on any new and changed medications related to discharge diagnosis. Advised obtaining a 90-day supply of all daily and as-needed medications. Education provided regarding infection prevention, and signs and symptoms of COVID-19 and when to seek medical attention with family who verbalized understanding. Discussed exposure protocols and quarantine from [address] you at higher risk for severe illness  and given an opportunity for questions and concerns--Mother is 80 y.o. Skye Money The family agrees to contact PCP office for questions related to their healthcare. CTN/ACM provided contact information for future reference. From CDC: Are you at higher risk for severe illness?  Wash your hands often.  Avoid close contact (6 feet, which is about two arm lengths) with people who are sick.  Put distance between yourself and other people if COVID-19 is spreading in your community.  Clean and disinfect frequently touched surfaces.  Avoid all cruise travel and non-essential air travel.  Call your healthcare professional if you have concerns about COVID-19 and your underlying condition or if you are sick. For more information on steps you can take to protect yourself, see CDC's How to Protect Yourself Patient/family/caregiver given information for [employer] and agrees to enroll no Plan for follow-up call in 7-14 days based on severity of symptoms and risk factors.

## 2020-05-29 NOTE — PROGRESS NOTES
Progress Notes        Patient was seen at [facility] drive thru flu clinic. Please seen scanned form for additional visit documentation. Patient consented to treatment. s- exposure to individual that was positive for covid 19; nurse's aide that assists her daily has tested positive for COVID-19. Has had dry cough for the past several days but denies shortness of breath. She was seen at [facility] ED on 5/26 for fever, fatigue and was treated for UTI with Cefdinir. Has been feeling better and is no longer running any fever. o  Visit Vitals  Temp 98.4F      Alert,  Nad,   No increase wob  Lungs cta bilat  rrr  Calm     A/p-     Exposure to covid 19  UTI -- currently being treated with Cefdinir.      Supportive care  F/up pcp

## 2020-06-01 NOTE — TELEPHONE ENCOUNTER
Called patient's son with her positive COVID-19 test results; will fax results to her oncologist's office. Advised to have his mother self-isolate for 14 days.

## 2020-06-02 NOTE — TELEPHONE ENCOUNTER
Pt tested positive for covid through [facility] family practice.   Son wants to speak to Dr Namita Valdez about this

## 2020-06-02 NOTE — TELEPHONE ENCOUNTER
Requested Prescriptions     Signed Prescriptions Disp Refills    clopidogreL (PLAVIX) 75 mg tab 30 Tab 5     Sig: TAKE ONE TABLET BY MOUTH DAILY     Authorizing Provider: Gui Grubbs     Ordering User: Fidel Palacios    Per Dr. Carnell Sever verbal orders

## 2020-06-02 NOTE — PROGRESS NOTES
Called Ms Marvel Zieg[address] son     He reports that his mother tested + for COVID    Her aid was tested and + and prompted his mother to be tested     He says she is breathing is fine and not having fevers. She has some congestion but otherwise doing well. He is going to get pulse ox and monitor . He says he checks for fever daily    Says he went to ER around 5/25 and was given antibiotic for UTI     He says he will take her to hospital if she is sick but is not sick at present.     DO LUIS  4:02 PM

## 2020-06-17 NOTE — ED NOTES
Pt provided with discharge instructions by provider. Pt verbalized understanding. Pts son notified that pt is ready for discharge. Pt wheeled out to waiting room with VSS and no signs of distress.

## 2020-06-17 NOTE — ED PROVIDER NOTES
HPI  
Patient is an 82-year-old elderly black female who presents to the ED with reports of cough, congestion and urinary frequency for over 1 week. She was tested by her primary care physician for COVID-19 1 week ago and the test result was positive. She has a past medical history for anemia,  coronary artery disease, congestive heart failure, chronic kidney disease, colon cancer, diabetes mellitus type 2, GERD, CVA, hypothyroidism, hypertension, hypothyroidism and metastatic breast cancer. She states she lives with her 3 adult sons. Denies fever, cold symptoms, headache, neck pain, visual changes, focal weakness or rash. Denies any  difficulty breathing, difficulty swallowing, SOB or chest pain. Denies any nausea, vomiting or diarrhea. Pt. Reports that she had her previously prescribed medications this morning prior to arrival.  
 
 
Past Medical History:  
Diagnosis Date  Acute on chronic systolic HF (heart failure) ([address] 5/19/2018  Age-related osteoporosis without current pathological fracture 9/2/2009  Anemia 9/2/2009  Asymptomatic hyperuricemia 2/16/2017  Breast cancer ([address]  CAD (coronary artery disease) 6/21/2018  Carotid bruit 8/31/2011  CHF (congestive heart failure) ([address]  CKD (chronic kidney disease) stage 3, GFR 30-59 ml/min ([facility]) 10/25/2017  Colon cancer ([address] 9/2/2009  
 surgery/chemo  Colon cancer ([address] 9/2/2009  COVID-19   
 DM (diabetes mellitus) ([address] 9/2/2009  GERD (gastroesophageal reflux disease)  H/O: CVA 9/2/2009  
 slight l sided weakness  Heart attack ([address]  HTN, goal below 140/90 9/2/2009  Hypothyroid 9/2/2009  Ill-defined condition   
 seasonal allergies  Long-term use of immunosuppressant medication 11/21/2016  Metastatic breast cancer ([address] 6/1/2018  Microalbuminuria 9/2/2009  Osteoporosis 9/2/2009  Other and unspecified hyperlipidemia 1/27/2010  PAD (peripheral artery disease) ([address] 9/7/2011  Primary osteoarthritis of both knees 2016  Rheumatoid arthritis involving ankle ([facility] 2016  Rheumatoid arthritis(714.0) 2009  Seropositive rheumatoid arthritis of multiple sites ([facility] 2016  Statin intolerance 2016  Type 2 diabetes mellitus with neurologic complication, with long-term current use of insulin [facility] 2009  Type 2 diabetes with nephropathy ([facility] 2018  Unspecified essential hypertension 2009  Vitamin D deficiency 2017  Weakness due to cerebrovascular accident Past Surgical History:  
Procedure Laterality Date  HX COLECTOMY  HX HYSTERECTOMY  HX OTHER SURGICAL    
 colonoscopies numerous since  Family History:  
Problem Relation Age of Onset  Diabetes Mother  Stroke Mother  Diabetes Sister  Other Sister   
     fell and hit her head -  of this  Diabetes Brother  Cancer Father   
     stomach  Diabetes Sister Social History Socioeconomic History  Marital status:  Spouse name: Not on file  Number of children: Not on file  Years of education: Not on file  Highest education level: Not on file Occupational History  Not on file Social Needs  Financial resource strain: Not on file  Food insecurity Worry: Not on file Inability: Not on file  Transportation needs Medical: Not on file Non-medical: Not on file Tobacco Use  Smoking status: Never Smoker  Smokeless tobacco: Never Used Substance and Sexual Activity  Alcohol use: No  
 Drug use: No  
 Sexual activity: Not Currently Partners: Male Lifestyle  Physical activity Days per week: Not on file Minutes per session: Not on file  Stress: Not on file Relationships  Social connections Talks on phone: Not on file Gets together: Not on file Attends [employer] service: Not on file Active member of club or organization: Not on file Attends meetings of clubs or organizations: Not on file Relationship status: Not on file  Intimate partner violence Fear of current or ex partner: Not on file Emotionally abused: Not on file Physically abused: Not on file Forced sexual activity: Not on file Other Topics Concern  Not on file Social History Narrative  Not on file ALLERGIES: Statins-hmg-coa reductase inhibitors and Sulfa (sulfonamide antibiotics) Review of Systems Constitutional: Negative for activity change, appetite change, fever and unexpected weight change. HENT: Positive for congestion and trouble swallowing. Negative for dental problem and sore throat. Respiratory: Positive for cough. Negative for shortness of breath and wheezing. Cardiovascular: Negative for chest pain, palpitations and leg swelling. Gastrointestinal: Negative for abdominal pain, constipation, diarrhea, nausea and vomiting. Genitourinary: Positive for difficulty urinating, dysuria and frequency. Musculoskeletal: Negative for back pain, myalgias and neck pain. Skin: Negative for rash. Neurological: Negative for dizziness, light-headedness and headaches. All other systems reviewed and are negative. Vitals:  
 06/17/20 1004 BP: 127/73 Pulse: 100 Resp: 16 Temp: 98.2 °F (36.8 °C) SpO2: 98% Physical Exam 
Vitals signs and nursing note reviewed. Constitutional:   
   General: She is not in acute distress. Appearance: Normal appearance. She is not ill-appearing, toxic-appearing or diaphoretic. Comments: Elderly black female, lives with adult sons, secondhand smoke exposure at home HENT:  
   Head: Normocephalic. Right Ear: Tympanic membrane normal.  
   Left Ear: Tympanic membrane normal.  
   Nose: Nose normal.  
   Mouth/Throat:  
   Mouth: Mucous membranes are moist.  
   Pharynx: No posterior oropharyngeal erythema. Neck: Musculoskeletal: Normal range of motion and neck supple. Cardiovascular:  
   Rate and Rhythm: Normal rate and regular rhythm. Pulmonary:  
   Effort: Pulmonary effort is normal.  
   Breath sounds: Normal breath sounds. Abdominal:  
   General: Bowel sounds are normal.  
   Palpations: Abdomen is soft. Tenderness: There is no abdominal tenderness. There is no guarding or rebound. Musculoskeletal: Normal range of motion. Skin: 
   General: Skin is warm and dry. Findings: No rash. Neurological:  
   Mental Status: She is alert and oriented to person, place, and time. Psychiatric:     
   Mood and Affect: Mood normal.     
   Behavior: Behavior normal.  
 
  
 
MDM Procedures Patient has been reexamined and reports that she is feeling better after IV fluids and IV antibiotics. Urine culture is pending. She is tolerating p.o. fluids and crackers well. Patient's results and plan of care have been reviewed with the pt and her adult son (by phone). Patient and/or family have verbally conveyed their understanding and agreement of the patient's signs, symptoms, diagnosis, treatment and prognosis and additionally agree to follow up as recommended or return to the Emergency Room should her condition change prior to follow-up. Discharge instructions have also been provided to the patient with some educational information regarding her diagnosis as well a list of reasons why she would want to return to the ER prior to her follow-up appointment should her condition change. Chapito Vera NP

## 2020-06-17 NOTE — DISCHARGE INSTRUCTIONS
Patient Education        Diabetes Sick-Day Plan: Care Instructions  Your Care Instructions     If you have diabetes, many other illnesses can make your blood sugar go up. This can be dangerous. When you are sick with the flu or another illness, your body releases hormones to fight infection. These hormones raise blood sugar levels. They also make it hard for insulin or other medicines to lower your blood sugar. Work with your doctor to make a plan for what to do on days when you are sick. Follow-up care is a key part of your treatment and safety. Be sure to make and go to all appointments, and call your doctor if you are having problems. It's also a good idea to know your test results and keep a list of the medicines you take. How can you care for yourself at home? · Work with your doctor to write up a sick-day plan for what to do on days when you are sick. Your blood sugar can go up or down, depending on your illness and whether you can keep food down. Call your doctor when you are sick. Ask if you need to adjust your pills or insulin. · Write down the diabetes medicines you have been taking and whether you have changed the dose based on your sick-day plan. Have this information ready when you call your doctor. · Eat your normal types and amounts of food. Drink extra fluids, such as water, broth, and fruit juice, to prevent dehydration. ? If your blood sugar level is higher than the blood sugar level your doctor recommends (for example, above 240 milligrams per deciliter [mg/dL]), drink extra liquids that do not contain sugar. Examples are water and sugar-free cola. ? If you can't eat your usual foods, drink extra liquids, such as soup, sports drinks, or milk. You may also eat food that is gentle on the stomach. These foods include crackers, gelatin dessert, and applesauce. Try to eat or drink 50 grams of carbohydrates every 3 to 4 hours.  For example, 6 saltine crackers, 1 cup (8 ounces) of milk, and ½ cup (4 ounces) of orange juice each contain about 15 grams of carbohydrate. · Check your blood sugar at least every 3 to 4 hours. If it goes up fast, check it more often. And check it even through the night. Take insulin if your doctor told you to do so. If you and your doctor did not have a sick-day plan for taking extra insulin, call him or her for advice. · If you take insulin, check your urine or blood for ketones. This is even more important if your blood sugar is high. · Do not take any over-the-counter medicines, such as pain relievers, decongestants, or herbal products or other natural medicines, without talking with your doctor first.  · Do not drive. If you need to see your doctor or go anywhere else, ask a family member or friend to drive you. When should you call for help? [id number] anytime you think you may need emergency care. For example, call if:  · You passed out (lost consciousness). · You are confused or cannot think clearly. · Your blood sugar is very high or very low. Watch closely for changes in your health, and be sure to contact your doctor if:  · Your blood sugar stays outside the level your doctor set for you. · You have any problems. Where can you learn more? Go to http://david-noni.info/  Enter L970 in the search box to learn more about \"Diabetes Sick-Day Plan: Care Instructions. \"  Current as of: December 20, 2019               Content Version: 12.5  © 5579-0381 [employer]. Care instructions adapted under license by [employer] (which disclaims liability or warranty for this information). If you have questions about a medical condition or this instruction, always ask your healthcare professional. [place] [address] any warranty or liability for your use of this information.          Patient Education        Chronic Kidney Disease: Care Instructions  Your Care Instructions     Chronic kidney disease happens when your kidneys don't work as well as they should. Your kidneys have a few important jobs. They remove waste from your blood. This waste leaves your body in your urine. They also balance your body's fluids and chemicals. When your kidneys don't work well, extra waste and fluid can build up. This can poison the body and sometimes cause death. The most common causes of this disease are diabetes and high blood pressure. In some cases, the disease develops in 2 to 3 months. But it usually develops over many years. If you take medicine and make healthy changes to your lifestyle, you may be able to prevent the disease from getting worse. But if your kidney damage gets worse, you may need dialysis or a kidney transplant. Dialysis uses a machine to filter waste from the blood. A transplant is surgery to give you a healthy kidney from another person. Follow-up care is a key part of your treatment and safety. Be sure to make and go to all appointments, and call your doctor if you are having problems. It's also a good idea to know your test results and keep a list of the medicines you take. How can you care for yourself at home? Treatments and appointments  · Be safe with medicines. Take your medicines exactly as prescribed. Call your doctor if you have any problems with your medicine. You also may take medicine to control your blood pressure or to treat diabetes. Many people who have diabetes take blood pressure medicine. · If you have diabetes, do your best to keep your blood sugar in your target range. You may do this by eating healthy food and exercising. You may also take medicines. · Go to your dialysis appointments if you have this treatment. · Do not take ibuprofen (Advil, Motrin), naproxen (Aleve), or similar medicines, unless your doctor tells you to. These may make the disease worse.   · Do not take any vitamins, over-the-counter medicines, or herbal products without talking to your doctor first.  · Do not smoke or use other tobacco products. Smoking can reduce blood flow to the kidneys. If you need help quitting, talk to your doctor about stop-smoking programs and medicines. These can increase your chances of quitting for good. · Do not drink alcohol or use illegal drugs. · Talk to your doctor about an exercise plan. Exercise helps lower your blood pressure. It also makes you feel better. · If you have an advance directive, let your doctor know. It may include a living will and a durable power of  for health care. If you don't have one, you may want to prepare one. It lets your doctor and loved ones know your health care wishes if you become unable to speak for yourself. Diet  · Talk to a registered dietitian. He or she can help you make a meal plan that is right for you. Most people with kidney disease need to limit salt (sodium), fluids, and protein. Some also have to limit potassium and phosphorus. · You may have to give up many foods you like. But try to focus on the fact that this will help you stay healthy for as long as possible. · If you have a hard time eating enough, talk to your doctor or dietitian about ways to add calories to your diet. · Your diet may change as your disease changes. See your doctor for regular testing. And work with a dietitian to change your diet as needed. When should you call for help? [id number] anytime you think you may need emergency care. For example, call if:  · You passed out (lost consciousness). Call your doctor now or seek immediate medical care if:  · You have less urine than normal or no urine. · You have trouble urinating or can urinate only very small amounts. · You are confused or have trouble thinking clearly. · You feel weaker or more tired than usual.  · You are very thirsty, lightheaded, or dizzy. · You have nausea and vomiting. · You have new swelling of your arms or feet, or your swelling is worse. · You have blood in your urine.   · You have new or worse trouble breathing. Watch closely for changes in your health, and be sure to contact your doctor if:  · You have any problems with your medicine or other treatment. Where can you learn more? Go to http://david-noni.info/  Enter N276 in the search box to learn more about \"Chronic Kidney Disease: Care Instructions. \"  Current as of: August 12, 2019               Content Version: 12.5  © 2006-2020 [employer]. Care instructions adapted under license by [employer] (which disclaims liability or warranty for this information). If you have questions about a medical condition or this instruction, always ask your healthcare professional. [place] [address] any warranty or liability for your use of this information. Patient Education        Urinary Tract Infection in Women: Care Instructions  Your Care Instructions     A urinary tract infection, or UTI, is a general term for an infection anywhere between the kidneys and the urethra (where urine comes out). Most UTIs are bladder infections. They often cause pain or burning when you urinate. UTIs are caused by bacteria and can be cured with antibiotics. Be sure to complete your treatment so that the infection goes away. Follow-up care is a key part of your treatment and safety. Be sure to make and go to all appointments, and call your doctor if you are having problems. It's also a good idea to know your test results and keep a list of the medicines you take. How can you care for yourself at home? · Take your antibiotics as directed. Do not stop taking them just because you feel better. You need to take the full course of antibiotics. · Drink extra water and other fluids for the next day or two. This may help wash out the bacteria that are causing the infection.  (If you have kidney, heart, or liver disease and have to limit fluids, talk with your doctor before you increase your fluid intake.)  · Avoid drinks that are carbonated or have caffeine. They can irritate the bladder. · Urinate often. Try to empty your bladder each time. · To relieve pain, take a hot bath or lay a heating pad set on low over your lower belly or genital area. Never go to sleep with a heating pad in place. To prevent UTIs  · Drink plenty of water each day. This helps you urinate often, which clears bacteria from your system. (If you have kidney, heart, or liver disease and have to limit fluids, talk with your doctor before you increase your fluid intake.)  · Urinate when you need to. · Urinate right after you have sex. · Change sanitary pads often. · Avoid douches, bubble baths, feminine hygiene sprays, and other feminine hygiene products that have deodorants. · After going to the bathroom, wipe from front to back. When should you call for help? Call your doctor now or seek immediate medical care if:  · Symptoms such as fever, chills, nausea, or vomiting get worse or appear for the first time. · You have new pain in your back just below your rib cage. This is called flank pain. · There is new blood or pus in your urine. · You have any problems with your antibiotic medicine. Watch closely for changes in your health, and be sure to contact your doctor if:  · You are not getting better after taking an antibiotic for 2 days. · Your symptoms go away but then come back. Where can you learn more? Go to http://david-noni.info/  Enter V139 in the search box to learn more about \"Urinary Tract Infection in Women: Care Instructions. \"  Current as of: August 22, 2019               Content Version: 12.5  © 8171-8178 [employer]. Care instructions adapted under license by [employer] (which disclaims liability or warranty for this information).  If you have questions about a medical condition or this instruction, always ask your healthcare professional. [employer] disclaims any warranty or liability for your use of this information. Patient Education        Learning How To Care for Someone Who Has COVID-19  Things to know  Most people who get COVID-19 will recover with time and home care. Here are some things to know if you're caring for someone who's sick. · Treat the symptoms. Common symptoms include a fever, coughing, and feeling short of breath. Urge the person to get extra rest and drink plenty of fluids to replace fluids lost from fever. To reduce a fever, offer acetaminophen (such as Tylenol). It may also help with muscle aches. Read and follow all instructions on the label. · Watch for signs that the illness is getting worse. The person may need medical care if they're getting sicker (for example, if it's hard to breathe). But call the doctor's office before you go. They can tell you what to do. Call 911 or emergency services if the person has any of these symptoms:  ? Severe trouble breathing or shortness of breath. ? Constant pain or pressure in their chest.  ? Confusion, or trouble thinking clearly. ? A blue tint to their lips or face. Some people are more likely to get very sick and need medical care. Call the doctor as soon as symptoms start if the person you're caring for is over 72, smokes, or has a serious health problem, like asthma, heart disease, diabetes, or an immune system problem. · Protect yourself and others. The virus spreads easily from person to person, so take extra care to avoid catching or spreading the infection. ? Keep the sick person away from others as much as you can. § Have the person stay in one room. If you can, give them their own bathroom to use. § Have only one person take care of them. Keep other people--and pets--out of the sickroom. § Have the person wear a cloth face cover around other people.  This includes when anyone is in the room with them or if they leave their room (for example, to go to the bathroom). If the face cover makes it harder for the sick person to breathe, the other person should wear a face cover. § Don't share personal items. These include dishes, cups, towels, and bedding. ? Wash your hands often and well. Use soap and water, and scrub for at least 20 seconds. This is especially important after you've been around the sick person or touched things they've touched. If soap and water aren't handy, use a hand  with at least 60% alcohol. ? Avoid touching your mouth, nose, and eyes. ? Take care with the person's laundry. It's okay to wash the sick person's laundry with yours. If you have them, wear disposable gloves when handling their dirty laundry, and wash your hands well after you touch it. Wash items in the warmest water allowed for the fabric type, and dry them completely. ? Clean high-touch items every day and anytime the sick person touches them. These include doorknobs, light switches, toilets, counters, and remote controls. Use a household disinfectant or a homemade bleach solution. (Follow the directions on the label.) If the sick person has their own room, have them disinfect it every day. ? Limit visitors to your home. To help protect family and friends, stay in touch with them only by phone or computer. Current as of: May 8, 2020               Content Version: 12.5  © 0876-6191 [employer]. Care instructions adapted under license by [employer] (which disclaims liability or warranty for this information). If you have questions about a medical condition or this instruction, always ask your healthcare professional. [place] [address] any warranty or liability for your use of this information.

## 2020-06-17 NOTE — ED TRIAGE NOTES
Pt brought in by son who stated she has had chronic UTI , pt started with a fever again, pt tested COVID positive 4 days ago, denies n/v , aide at home detected a strong odor in her urine, son wants to r/o sepsis again and wants her tested for COVID again

## 2020-06-18 NOTE — PROGRESS NOTES
Patient contacted regarding COVID-19  risk. Discussed COVID-19 related testing which was not done at this time. Test results were not done. Patient informed of results, if available? n/a, pt was tested for COVID 19 on 20, pt was positive. Care Transition Nurse/ Ambulatory Care Manager contacted the patient by telephone to perform post discharge assessment. Verified name and  with family as identifiers. Provided introduction to self, and explanation of the CTN/ACM role, and reason for call due to risk factors for infection and/or exposure to COVID-19. Symptoms reviewed with family who verbalized the following symptoms: no new symptoms and no worsening symptoms. Due to no new or worsening symptoms encounter was routed to provider for escalation. Discussed follow-up appointments. If no appointment was previously scheduled, appointment scheduling offered: yes [facility] follow up appointment(s):  
Future Appointments Date Time Provider Prasanna Diana 2020  3:40 PM Halle Fair  [address]  
2020  3:00 PM Raciel Larkin MD [address] [facility] follow up appointment(s): n/a Patient has following risk factors of: diabetes. CTN/ACM reviewed discharge instructions, medical action plan and red flags such as increased shortness of breath, increasing fever and signs of decompensation with family who verbalized understanding. Discussed exposure protocols and quarantine with CDC Guidelines What to do if you are sick with coronavirus disease .  Family was given an opportunity for questions and concerns. The family agrees to contact the [employer] exposure line [phone], [facility] department [address]  ([phone]) and PCP office for questions related to their healthcare. CTN/ACM provided contact information for future needs. Reviewed and educated family on any new and changed medications related to discharge diagnosis. Patient/family/caregiver given information for [employer] and agrees to enroll no Patient's preferred e-mail:  n/a Patient's preferred phone number: n/a Based on Loop alert triggers, patient will be contacted by nurse care manager for worsening symptoms. Plan for follow up in 14 days based on severity of symptoms and risk factors.

## 2020-06-19 NOTE — PROGRESS NOTES
Rafi Parish 
82 y.o. female 1935 
[address] [facility] [address] 
[id number] BONNIE Daly 53 Encounter Date: 6/19/2020 Established Patient Visit Note: Little Suh MD 
 
Reason for Appointment: 
Chief Complaint Patient presents with  
[facility] Follow Up History of Present Illness: 
History provided by patient Rafi Parish is a 82 y.o. female who presents today for: UTI Recently in ER for recurrent UTI. She was given 1 gram rocephin and placed on PO macrobid. Her son reports that her symptoms are improving, she is doing okay at this time. Urine culture grew Klebsiella pneumonia s/t Ceftriaxone and Macrobid. She was recently referred to urology for recurrent UTI, and she has appointment with urology scheduled on 6/30/20. COVID Infection Patient tested positive for covid on on 5/30/20. She had wet cough and fever. Her son reports that 6 days her fever resolved and her cough was improving. Her son reports that he would like to have her retested for COVID to ensure. Her son reports that she needs to negative tests for her home health agency to return. Her son reports that prior to getting COVID she was getting OT and PT d/t her weakness, debility, and arthritis, and he would like to resume this. Review of Systems Review of Systems Constitutional: Negative for chills and fever. Respiratory: Negative for cough, shortness of breath and wheezing. Cardiovascular: Negative for chest pain and palpitations. Allergies: Statins-hmg-coa reductase inhibitors and Sulfa (sulfonamide antibiotics) Medications: (Updated to reflect final medication list after visit) Current Outpatient Medications:  
  nitrofurantoin, macrocrystal-monohydrate, (Macrobid) 100 mg capsule, Take 1 Cap by mouth two (2) times a day for 3 days. , Disp: 6 Cap, Rfl: 0 
  clopidogreL (PLAVIX) 75 mg tab, TAKE ONE TABLET BY MOUTH DAILY, Disp: 30 Tab, Rfl: 5   cholecalciferol (Vitamin D3) 25 mcg (1,000 unit) cap, Take 1,000 Units by mouth daily. , Disp: , Rfl:  
  glucosam/wesley-msm1/C/bassam/bosw (OSTEO BI-FLEX TRIPLE STRENGTH PO), Take 2 Tabs by mouth daily. , Disp: , Rfl:  
  lecithin, soy 1,200 mg cap, Take 1 Cap by mouth daily. , Disp: , Rfl:  
  furosemide (Lasix) 40 mg tablet, Take 40 mg by mouth daily. , Disp: , Rfl:  
  evolocumab (Repatha SureClick) pen injection, 140 mg by SubCUTAneous route every fourteen (14) days. , Disp: , Rfl:  
  carvediloL (COREG) 12.5 mg tablet, TAKE ONE TABLET BY MOUTH TWICE A DAY (Patient taking differently: Take 6.25 mg by mouth two (2) times a day.), Disp: 60 Tab, Rfl: 5 
  insulin aspart U-100 (NOVOLOG FLEXPEN U-100 INSULIN) 100 unit/mL (3 mL) inpn, by SubCUTAneous route Before breakfast, lunch, dinner and at bedtime. 2-4 units , Disp: , Rfl:  
  multivitamin (ONE A DAY) tablet, Take 1 Tab by mouth daily. , Disp: , Rfl:  
  insulin degludec (TRESIBA FLEXTOUCH U-100) 100 unit/mL (3 mL) inpn, 12 Units by SubCUTAneous route daily. , Disp: , Rfl:  
  nitroglycerin (NITROSTAT) 0.4 mg SL tablet, 1 Tab by SubLINGual route as needed for Chest Pain. Up to 3 doses. (Patient taking differently: 1 Tab by SubLINGual route every five (5) minutes as needed for Chest Pain. Up to 3 doses.), Disp: 40 Tab, Rfl: 0 
  levothyroxine (SYNTHROID) 88 mcg tablet, Take 88 mcg by mouth Daily (before breakfast). , Disp: , Rfl:  
  aspirin delayed-release 81 mg tablet, Take 81 mg by mouth daily. , Disp: , Rfl:  
  OMEGA-3 FATTY ACIDS/FISH OIL (OMEGA 3 FISH OIL PO), Take 600 mg by mouth daily. , Disp: , Rfl:  
 
History Patient Care Team: 
Yo Harper MD as PCP - [facility]) Yo Harper [facility]CP [facility] Empaneled Provider Kelley Guallpa MD (Cardiology) Brooke Urban MD as Physician (Hematology and Oncology) Hayley Artis MD as Physician (Nephrology) Judy Yee MD as Physician (Endocrinology) Kath Wilkins MD (Pulmonary Disease) Mike Post MD (Cardiology) Jh Cox, RN as Ambulatory Care Manager Past Medical History: she has a past medical history of Acute on chronic systolic HF (heart failure) ([facility] (5/19/2018), Age-related osteoporosis without current pathological fracture (9/2/2009), Anemia (9/2/2009), Asymptomatic hyperuricemia (2/16/2017), Breast cancer ([address] CAD (coronary artery disease) (6/21/2018), Carotid bruit (8/31/2011), CHF (congestive heart failure) ([facility] CKD (chronic kidney disease) stage 3, GFR 30-59 ml/min ([address] (10/25/2017), Colon cancer ([address] (9/2/2009), Colon cancer ([address] (9/2/2009), COVID-19, DM (diabetes mellitus) ([facility] (9/2/2009), GERD (gastroesophageal reflux disease), H/O: CVA (9/2/2009), Heart attack ([address] HTN, goal below 140/90 (9/2/2009), Hypothyroid (9/2/2009), Ill-defined condition, Long-term use of immunosuppressant medication (11/21/2016), Metastatic breast cancer ([address] (6/1/2018), Microalbuminuria (9/2/2009), Osteoporosis (9/2/2009), Other and unspecified hyperlipidemia (1/27/2010), PAD (peripheral artery disease) ([address] (9/7/2011), Primary osteoarthritis of both knees (9/28/2016), Rheumatoid arthritis involving ankle ([address] (9/28/2016), Rheumatoid arthritis(714.0) (9/2/2009), Seropositive rheumatoid arthritis of multiple sites ([address] (9/28/2016), Statin intolerance (12/21/2016), Type 2 diabetes mellitus with neurologic complication, with long-term current use of insulin ([address] (9/2/2009), Type 2 diabetes with nephropathy ([facility] (8/20/2018), Unspecified essential hypertension (9/2/2009), Vitamin D deficiency (6/16/2017), and Weakness due to cerebrovascular accident. Past Surgical History: she has a past surgical history that includes hx colectomy; hx hysterectomy; and hx other surgical. 
 
Family Medical History: family history includes Cancer in her father; Diabetes in her brother, mother, sister, and sister;  Other in her sister; Stroke in her mother. Social History: she reports that she has never smoked. She has never used smokeless tobacco. She reports that she does not drink alcohol or use drugs. Objective:  
Vital Signs There were no vitals taken for this visit. Unable to obtain full set of vital signs today as patient does not have all equipment for this at home Physical Exam 
Constitutional:   
   Appearance: Normal appearance. She is well-groomed and normal weight. Eyes:  
   General: Lids are normal. Vision grossly intact. Gaze aligned appropriately. Conjunctiva/sclera:  
   Right eye: Right conjunctiva is not injected. Left eye: Left conjunctiva is not injected. Neck: Musculoskeletal: Full passive range of motion without pain and normal range of motion. Pulmonary:  
   Effort: Pulmonary effort is normal. No tachypnea, bradypnea, accessory muscle usage or respiratory distress. Skin: 
   Coloration: Skin is not ashen, cyanotic, jaundiced or mottled. Neurological:  
   General: No focal deficit present. Mental Status: She is alert. Mental status is at baseline. Psychiatric:     
   Attention and Perception: Attention and perception normal.     
   Mood and Affect: Mood and affect normal.     
   Speech: Speech normal.     
   Behavior: Behavior normal. Behavior is cooperative. Assessment & Plan: ICD-10-CM ICD-9-CM 1. Recurrent UTI N39.0 599.0 2. COVID-19 U07.1 079.89   
 
-Recurrent UTI: ER follow up visit. Reviewed Uculture showing Klebsiella s/t both Ceftriaxone and macrobid. Patient's symptoms are improving. Discussed reasons to call or go to ED.  
- COVID 19: Patient improving. She would like retest so that her home health ,ursing, PT and OT can resume. Discussed going to flu clinic for SARS-CoV-2 recheck. I was in the office while conducting this encounter.  
 
Consent: 
She and/or her healthcare decision maker is aware that this patient-initiated Telehealth encounter is a billable service, with coverage as determined by her insurance carrier. She is aware that she may receive a bill and has provided verbal consent to proceed: Yes This virtual visit was conducted via [employer] me. Pursuant to the emergency declaration under the [facility], [facility] waiver authority and the [employer] Act, this Virtual  Visit was conducted to reduce the patient's risk of exposure to COVID-19 and provide continuity of care for an established patient. Services were provided through a video synchronous discussion virtually to substitute for in-person clinic visit. Due to this being a TeleHealth evaluation, many elements of the physical examination are unable to be assessed. Total Time: minutes: 11-20 minutes. I have discussed the diagnosis with the patient and the intended plan as seen in the above orders. The patient has received an after-visit summary along with patient information handout. I have discussed medication side effects and warnings with the patient as well. Disposition Follow-up and Dispositions · Return if symptoms worsen or fail to improve.  
  
 
 
 
Angelique Crawley MD

## 2020-06-23 NOTE — PROGRESS NOTES
Patient is being seen at the [address] [place] [address]. Please see scanned documentation as well for further information. Patient consented for treatment. S:  Ms. Chelsea Dodd presents for Covid testing. Patient denies current Covid type symptoms. Patient tested positive for covid 19 about 5 weeks ago. Needs 2 negative covid tests to restart chemo for breast cancer. Patient presents for first retest. Presents with family. O:    Visit Vitals  Pulse 81   Temp 98.2 °F (36.8 °C) (Oral)   SpO2 94%     Alert  No acute distress, no increased work of breathing  Normocephalic, atraumatic  Skin color normal  Calm and cooperative  Voice clear, conversant without shortness of breath  Conjunctiva normal      A/P:  Recent Covid 19    Covid 19 testing performed  Patient understands she will be contacted with the results. Follow up prn with primary care provider discussed.

## 2020-07-01 NOTE — TELEPHONE ENCOUNTER
Requested Prescriptions     Signed Prescriptions Disp Refills    furosemide (LASIX) 40 mg tablet 30 Tab 1     Sig: Take 1 tab by mouth daily or as advised by Dr. Juan Jose Cook Provider: Juan Osman     Ordering User: Guzman Or    Per Dr. Vicenta Snyder verbal orders

## 2020-07-02 NOTE — PROGRESS NOTES
Patient is being seen at the [address] [place] [address]. Please see scanned documentation as well for further information. Patient consented for treatment. S:  Ms. Dayanna Chua presents for Covid testing. Patient denies current Covid type symptoms. Reports chronic cough. Positive for covid - still trying to get a negative test so she can resume chemotherapy. O:    Visit Vitals  Pulse 79   Temp 98 °F (36.7 °C)   Resp 18   SpO2 (!) 79%     Alert and oriented  No acute distress, no increased work of breathing  Normocephalic, atraumatic  Skin color normal  Calm and cooperative  Voice clear, conversant without shortness of breath  Conjunctiva normal    A/P:  Recent Covid 19      Covid 19 testing performed  Patient understands she will be contacted with the results. Follow up prn with primary care provider discussed.

## 2020-07-02 NOTE — PROGRESS NOTES
Patient resolved from Transition of Care episode on 7/2/20. Discussed COVID-19 related testing which was not done at this time. Test results were not done. Patient informed of results, if available? n/a, pt was tested for COVID 19 on 5/29/20 & 6/23/20 both test were positive, pt's family aware of results. Patient/family has been provided the following resources and education related to COVID-19:  
           
          Signs, symptoms and red flags related to COVID-19 CDC exposure and quarantine guidelines [facility] exposure contact - [phone] Contact for their local Department of Health Patient currently reports that the following symptoms have improved:  no new symptoms and no worsening symptoms. No further outreach scheduled with this CTN/ACM/LPN/HC/ MA. Episode of Care resolved. Patient has this CTN/ACM/LPN/HC/MA contact information if future needs arise.

## 2020-07-16 NOTE — ED TRIAGE NOTES
Arrives with family member for c/o generalized weakness, low-grade fever x 3 days, difficulty void this morning, and vaginal itching. Stage 4 breast CA, CHF, COVID in June, tested negative in June.

## 2020-07-16 NOTE — ED PROVIDER NOTES
Patient is a 61-year-old female with a complex medical history including recent COVID-19 infection (but most recent test negative 2-3 weeks ago, per family), breast cancer with chemotherapy paused due to her recent coronavirus infection, who presents to the emergency department with a chief complaint of fatigue, malaise, and a low-grade temperature this morning to 100 °F.  She also complains of urinary frequency and urgency. Feels like prior UTIs. Increased dysuria. Nothing makes it better. Frequent UTIs in the past.  Denies any falls or trauma. Also denies new chest pain, cough, shortness of breath, abdominal pain, vomiting, or diarrhea. No other complaints today. Fatigue   Pertinent negatives include no shortness of breath, no chest pain, no vomiting and no headaches. Urinary Pain    Associated symptoms include frequency. Pertinent negatives include no vomiting, no abdominal pain and no back pain.         Past Medical History:   Diagnosis Date    Acute on chronic systolic HF (heart failure) ([address] 5/19/2018    Age-related osteoporosis without current pathological fracture 9/2/2009    Anemia 9/2/2009    Asymptomatic hyperuricemia 2/16/2017    Breast cancer ([facility]     CAD (coronary artery disease) 6/21/2018    Carotid bruit 8/31/2011    CH[facility]congestive [facility] failure) [facility]     CKD (chronic kidney disease) stage 3, GFR 30-59 ml/min ([facility]) 10/25/2017    Colon cancer ([facility] 9/2/2009    surgery/chemo    Colon cancer ([address] 9/2/2009    COVID-19     DM (diabetes mellitus) ([address] 9/2/2009    GERD (gastroesophageal reflux disease)     H/O: CVA 9/2/2009    slight l sided weakness    Heart attack ([address]     HTN, goal below 140/90 9/2/2009    Hypothyroid 9/2/2009    Ill-defined condition     seasonal allergies    Long-term use of immunosuppressant medication 11/21/2016    Metastatic breast cancer ([address] 6/1/2018    Microalbuminuria 9/2/2009    Osteoporosis 9/2/2009    Other and unspecified hyperlipidemia 2010    PAD (peripheral artery disease) ([facility] 2011    Primary osteoarthritis of both knees 2016    Rheumatoid arthritis involving [facility] 2016    Rheumatoid arthritis(714.0) 2009    Seropositive rheumatoid arthritis of multiple sites ([facility] 2016    Statin intolerance 2016    Type 2 diabetes mellitus with neurologic complication, with long-term current use of insulin ([facility] 2009    Type 2 diabetes with nephropathy ([facility] 2018    Unspecified essential hypertension 2009    Vitamin D deficiency 2017    Weakness due to cerebrovascular accident        Past Surgical History:   Procedure Laterality Date    HX COLECTOMY      HX HYSTERECTOMY      HX OTHER SURGICAL      colonoscopies numerous since          Family History:   Problem Relation Age of Onset    Diabetes Mother     Stroke Mother     Diabetes Sister     Other Sister         fell and hit her head -  of this   Bonnee Bird Diabetes Brother     Cancer Father         stomach    Diabetes Sister        Social History     Socioeconomic History    Marital status:      Spouse name: Not on file    Number of children: Not on file    Years of education: Not on file    Highest education level: Not on file   Occupational History    Not on file   Social Needs    Financial resource strain: Not on file    Food insecurity     Worry: Not on file     Inability: Not on file    Transportation needs     Medical: Not on file     Non-medical: Not on file   Tobacco Use    Smoking status: Never Smoker    Smokeless tobacco: Never Used   Substance and Sexual Activity    Alcohol use: No    Drug use: No    Sexual activity: Not Currently     Partners: Male   Lifestyle    Physical activity     Days per week: Not on file     Minutes per session: Not on file    Stress: Not on file   Relationships    Social connections     Talks on phone: Not on file     Gets together: Not on file     Attends [employer] service: Not on file     Active member of club or organization: Not on file     Attends meetings of clubs or organizations: Not on file     Relationship status: Not on file    Intimate partner violence     Fear of current or ex partner: Not on file     Emotionally abused: Not on file     Physically abused: Not on file     Forced sexual activity: Not on file   Other Topics Concern    Not on file   Social History Narrative    Not on file         ALLERGIES: Statins-hmg-coa reductase inhibitors and Sulfa (sulfonamide antibiotics)    Review of Systems   Constitutional: Positive for activity change and fatigue. Negative for fever. Respiratory: Negative for cough and shortness of breath. Cardiovascular: Negative for chest pain. Gastrointestinal: Negative for abdominal pain and vomiting. Genitourinary: Positive for dysuria and frequency. Musculoskeletal: Negative for back pain. Skin: Negative for rash. Neurological: Positive for weakness (generalized, denies focal symptoms). Negative for syncope and headaches. All other systems reviewed and are negative. Vitals:    07/16/20 1112 07/16/20 1215   BP: 94/58 105/45   Pulse: 97 93   Resp: 16 16   Temp: 98.7 °F (37.1 °C)    SpO2: 96% 97%            Physical Exam  Vitals signs and nursing note reviewed. Constitutional:       General: She is not in acute distress. Appearance: She is well-developed. HENT:      Head: Normocephalic and atraumatic. Eyes:      Conjunctiva/sclera: Conjunctivae normal.   Neck:      Musculoskeletal: Normal range of motion and neck supple. Cardiovascular:      Rate and Rhythm: Normal rate and regular rhythm. Heart sounds: Normal heart sounds. Pulmonary:      Effort: Pulmonary effort is normal. No respiratory distress. Breath sounds: Normal breath sounds. Abdominal:      Palpations: Abdomen is soft. Tenderness: There is no abdominal tenderness. There is no guarding.    Musculoskeletal: Normal range of motion. Skin:     General: Skin is warm and dry. Neurological:      General: No focal deficit present. Mental Status: She is alert and oriented to person, place, and time. Motor: No abnormal muscle tone. MDM       Procedures    1:42 PM  Spoke with patient's son at [phone].     2:33 PM  Unable to collect sample of urine, patient was incontinent of urine. Nurse noted yeast infection. Plan to treat empirically with Macrobid, which she was sensitive to last month as well as 1 dose of Diflucan.

## 2020-07-16 NOTE — ED NOTES
Discharge instructions provided to pt. Pt verbalized understanding. IV removed. Pt wheeled out of ED to awaiting ride with VSS and no signs of distress.

## 2020-07-17 NOTE — PROGRESS NOTES
ED 2020:  James Galan  Patient contacted regarding COVID-19  risk. Discussed COVID-19 related testing which was not done at this time. Test results were not done. Patient informed of results, if available? no     Care Transition Nurse/ Ambulatory Care Manager contacted the family by telephone to perform post discharge assessment. Verified name and  with family as identifiers. Provided introduction to self, and explanation of the CTN/ACM role, and reason for call due to risk factors for infection and/or exposure to COVID-19. Healthcare Decision Maker remains:  Son Robin Ledesma    Symptoms reviewed with family who verbalized the following symptoms: fatigue. Due to no new or worsening symptoms encounter was not routed to provider for escalation. Discussed follow-up appointments. If no appointment was previously scheduled, appointment scheduling offered: [facility] follow up appointment(s):   Future Appointments   Date Time Provider Prasanna Diana   2020  3:40 PM Mateo Cates  [address]   2020  3:00 PM Annamarie Waters MD [facility]     [id number] [address] follow up appointment(s): none      Advance Care Planning:   Does patient have an Advance Directive: not on file; education provided   [place] sons:  Franny Bourne    Patient has following risk factors of: dysuria. CTN/ACM reviewed discharge instructions, medical action plan and red flags such as increased shortness of breath, increasing fever and signs of decompensation with family who verbalized understanding. Discussed exposure protocols and quarantine with CDC Guidelines What to do if you are sick with coronavirus disease .  Family was given an opportunity for questions and concerns. The family agrees to contact the [facility] exposure line [phone], and PCP office for questions related to their healthcare. CTN/ACM provided contact information for future needs.     Reviewed and educated family on any new and changed medications related to discharge diagnosis. Patient/family/caregiver given information for [employer] and agrees to enroll no    Plan for follow-up call in 5-7 days based on severity of symptoms and risk factors.

## 2020-07-23 PROBLEM — I50.23 ACUTE ON CHRONIC SYSTOLIC HF (HEART FAILURE) (HCC): Status: RESOLVED | Noted: 2018-05-19 | Resolved: 2020-01-01

## 2020-07-23 PROBLEM — R65.10 SYSTEMIC INFLAMMATORY RESPONSE SYNDROME (HCC): Status: RESOLVED | Noted: 2020-01-01 | Resolved: 2020-01-01

## 2020-07-23 NOTE — PROGRESS NOTES
Javier Sykes, who was evaluated through a synchronous (real-time) audio-video encounter, and/or her healthcare decision maker, is aware that it is a billable service, with coverage as determined by her insurance carrier. She provided verbal consent to proceed: YES, and patient identification was verified. It was conducted pursuant to the emergency declaration under the [facility], [facility] authority and the [employer] and [employer] General Act. A caregiver was present when appropriate. Ability to conduct physical exam was limited. I was at home. The patient was at home. This virtual visit was conducted via [employer]. Pursuant to the emergency declaration under the [facility], [facility] authority and the [employer] and [employer] Act, this Virtual  Visit was conducted to reduce the patient's risk of exposure to COVID-19 and provide continuity of care for an established patient. Services were provided through a video synchronous discussion virtually to substitute for in-person clinic visit. Due to this being a TeleHealth evaluation, many elements of the physical examination are unable to be assessed. Total Time: minutes: 21-30 minutes. Kashmir Greenfield MD    712  Subjective:   Javier Sykes was seen for Diabetes    Son present for interview. Pt has had recurrent UTIs with the most recent one treated in ER on 7/17 on Macrobid. Will be seeing urology in 2 weeks. Has had about 5 UTIs in the past 7 months. Has metastatic breast cancer with active liver lesion; will be getting radiation for the liver lesion and hopefully stopping chemotherapy. Was infected with COVID a few months back but recovered well. Son states that her glucose has been higher in the past week. Fasting is in high 100s and postprandial has been close to 200s.  Usually lower; has not had hypoglycemia. Taking Tresiba 14 units in the AM and 2-4 units of Novolog with meals. Would like to switch DM care to me instead of Dr. Paola Ken. Son would like to reorder [place] [facility] for strengthening. She has felt more lethargic since her UTIs and anemia. Hx of RA. Prior to Admission medications    Medication Sig Start Date End Date Taking? Authorizing Provider   nitrofurantoin, macrocrystal-monohydrate, (Macrobid) 100 mg capsule Take 1 Cap by mouth two (2) times a day for 7 days. 7/16/20 7/23/20  Tobias Sosa MD   furosemide (LASIX) 40 mg tablet Take 1 tab by mouth daily or as advised by Dr. Ilya Chadwick 7/1/20   Magali Headley MD   clopidogreL (PLAVIX) 75 mg tab TAKE ONE TABLET BY MOUTH DAILY 6/2/20   Magali Headley MD   cholecalciferol (Vitamin D3) 25 mcg (1,000 unit) cap Take 1,000 Units by mouth daily. Provider, Historical   glucosam/wesley-msm1/C/bassam/bosw (OSTEO BI-FLEX TRIPLE STRENGTH PO) Take 2 Tabs by mouth daily. Sunita Hernandez MD   lecithin, soy 1,200 mg cap Take 1 Cap by mouth daily. Sunita Hernandez MD   evolocumab (Repatha SureClick) pen injection 718 mg by SubCUTAneous route every fourteen (14) days. Provider, Historical   carvediloL (COREG) 12.5 mg tablet TAKE ONE TABLET BY MOUTH TWICE A DAY  Patient taking differently: Take 6.25 mg by mouth two (2) times a day. 3/17/20   Srikanth Elizabeth III, MD   insulin aspart U-100 (NOVOLOG FLEXPEN U-100 INSULIN) 100 unit/mL (3 mL) inpn by SubCUTAneous route Before breakfast, lunch, dinner and at bedtime. 2-4 units     Provider, Historical   multivitamin (ONE A DAY) tablet Take 1 Tab by mouth daily. Provider, Historical   insulin degludec (TRESIBA FLEXTOUCH U-100) 100 unit/mL (3 mL) inpn 12 Units by SubCUTAneous route daily. Provider, Historical   nitroglycerin (NITROSTAT) 0.4 mg SL tablet 1 Tab by SubLINGual route as needed for Chest Pain. Up to 3 doses.   Patient taking differently: 1 Tab by SubLINGual route every five (5) minutes as needed for Chest Pain. Up to 3 doses. 6/6/18   Kris Llanes MD   levothyroxine (SYNTHROID) 88 mcg tablet Take 88 mcg by mouth Daily (before breakfast). Provider, Historical   aspirin delayed-release 81 mg tablet Take 81 mg by mouth daily. Provider, Historical   OMEGA-3 FATTY ACIDS/FISH OIL (OMEGA 3 FISH OIL PO) Take 600 mg by mouth daily. Provider, Historical       Allergies   Allergen Reactions    Statins-Hmg-Coa Reductase Inhibitors Other (comments)     Intolerant to statins     Sulfa (Sulfonamide Antibiotics) Other (comments)     syncope       Review of Systems   Constitutional: Positive for malaise/fatigue. Negative for fever and weight loss. Respiratory: Negative for cough, hemoptysis, shortness of breath and wheezing. Cardiovascular: Negative for chest pain, palpitations, leg swelling and PND. Gastrointestinal: Negative for abdominal pain, constipation, diarrhea, nausea and vomiting. Physical Exam:     There were no vitals taken for this visit. General: alert, cooperative, no distress   Mental  status: normal mood, behavior, speech, dress, motor activity, and thought processes, able to follow commands   HENT: NCAT   Neck: no visualized mass   Resp: no respiratory distress   Neuro: no gross deficits   Skin: no discoloration or lesions of concern on visible areas   Psychiatric: normal affect, consistent with stated mood, no evidence of hallucinations       Assessment & Plan:     Carol Hanna is a 82 y.o. female who presents today for:    1. Type 2 diabetes mellitus without complication, with long-term current use of insulin (HCC)  Stable, continue current treatment pending review of labs. Sugars may be high recently due to ongoing UTI. Will adjust insulin as needed.  - BSI MYCHART GLUCOSE FLOWSHEET  - HEMOGLOBIN A1C WITH EAG  - MICROALBUMIN, UR, RAND W/ MICROALB/CREAT RATIO    2.  Mixed hyperlipidemia  - LIPID PANEL    3. Physical deconditioning  - REFERRAL TO HOME HEALTH    4. At risk for falls  - [address]    5. Seropositive rheumatoid arthritis of multiple sites ([facility]  - [facility]    6. Recurrent UTI  Pt to f/u with urology. 7. Metastatic breast cancer ([facility]       There are no discontinued medications. Follow-up and Dispositions    · Return in about 3 months (around 10/23/2020) for Medicare Wellness Visit (30 min), DM follow up. Treatment risks/benefits/costs/interactions/alternatives discussed with patient. Advised patient to call back or return to office if symptoms worsen/change/persist. If patient cannot reach us or should anything more severe/urgent arise he/she should proceed directly to the nearest emergency department. Discussed expected course/resolution/complications of diagnosis in detail with patient. Patient expressed understanding with the diagnosis and plan. Yves Vitale M.D.

## 2020-08-11 NOTE — ED TRIAGE NOTES
She arrives with her son. She has left breast cancer dx two years ago. She has a lesion under her left breast that has broken open and started to drain. She says since she got here she has some dizziness. She is a diabetic and has not eaten.

## 2020-08-12 PROBLEM — I50.9 CHF (CONGESTIVE HEART FAILURE) (HCC): Status: ACTIVE | Noted: 2020-01-01

## 2020-08-12 NOTE — PROGRESS NOTES
08/12/20 1245 CPAP/BIPAP  
CPAP/BIPAP Start/Stop On Device Mode BIPAP  
$$ Bipap Daily Yes Bio-Med ID # [id number] Mask Type and Size Medium Skin Condition intact PIP Observed 10 cm H20 IPAP (cm H2O) 10 cm H2O  
EPAP (cm H2O) 5 cm H2O Inspiratory Time (sec) 1 seconds Vt Spont (ml) 306 ml Ve Observed (l/min) 9.1 l/min Backup Rate 4 Total RR (Spontaneous) 26 breaths per minute Insp Rise Time (sec) 3 Leak (Estimated) 12 L/min  
Pt's Home Machine No  
Biomedical Check Performed Yes Settings Verified Yes Pt placed on BIPAP at this time, tolerating well

## 2020-08-12 NOTE — ED NOTES
Wound care performed on wound under pt's L breast. Explained wound care instructions to son and patient and they verbalized understanding.

## 2020-08-12 NOTE — ED TRIAGE NOTES
Pt brought in by family who came running in and stated \"we know what this is , she is in pulmonary edema\" and needs to get an IV dose of Lasix now, she was here yesterday , pt c/o sob

## 2020-08-12 NOTE — ED NOTES
Patient reports being hungry and thirsty, Dr. Mona Etienne paged, orders received to trial off BIPAP for diet.

## 2020-08-12 NOTE — PROGRESS NOTES
Patient settled in bed, labs drawn and sent. No complaints pain at this time, not complaints of shortness of breath. Sent message to pharmacy for heparin drip. EKG done at last troponin draw. 2030- Started heparin drip 2032- Lab called with a critical troponin of 4.35, paged hospitalist. No new orders 2100 Bedside shift change report given to [address], RN (oncoming nurse) by Michelle Dyer RN (offgoing nurse). Report included the following information SBAR, Kardex, Procedure Summary, Intake/Output, MAR, Accordion, Recent Results, Med Rec Status and Cardiac Rhythm NSR.

## 2020-08-12 NOTE — PROGRESS NOTES
Patient troponin increased to 2.4 from 0.16. Will start heparin drip with bolus for acute MI. Trend another troponin in 2 hours. Cardiology to evaluate the patient.

## 2020-08-12 NOTE — ROUTINE PROCESS
TRANSFER - OUT REPORT: 
 
Verbal report given to ARIANA Alegria(name) on Benjamin Plummer  being transferred to [address] [place] [address](unit) for routine progression of care Report consisted of patients Situation, Background, Assessment and  
Recommendations(SBAR). Information from the following report(s) SBAR, Kardex, Intake/Output, MAR and Recent Results was reviewed with the receiving nurse. Lines:  
Peripheral IV 08/12/20 Right Antecubital (Active) Site Assessment Clean, dry, & intact 08/12/20 1213 Phlebitis Assessment 0 08/12/20 1213 Infiltration Assessment 0 08/12/20 1213 Dressing Status Clean, dry, & intact 08/12/20 1213 Hub Color/Line Status Pink;Capped;Flushed;Patent 08/12/20 1213 Action Taken Blood drawn 08/12/20 1213 Peripheral IV 08/12/20 Left Forearm (Active) Site Assessment Clean, dry, & intact 08/12/20 1636 Phlebitis Assessment 0 08/12/20 1636 Infiltration Assessment 0 08/12/20 1636 Dressing Status Clean, dry, & intact 08/12/20 1636 Opportunity for questions and clarification was provided. Patient transported with: 
 Monitor Tech

## 2020-08-12 NOTE — H&P
[facility] Adult  Hospitalist Group History and Physical 
 
Primary Care Provider: Yumi William MD 
Date of Service:  8/12/2020 Subjective: Fabiano Tello is a 82 y.o. female who presents for evaluation of shortness of breath. Patient has history of congestive heart failure, last echocardiogram in April 2020 with EF of 40 to 45%. She is currently on Lasix 40 mg daily which she states has been compliant with. She denies intake of excessive salt. Patient was doing fine yesterday and went to bed. This morning when she woke up, patient had acute onset shortness of breath and certainly presented to the emergency room. Patient was subsequently put on BiPAP. Further work-up reveals a patient with mild elevation of troponin as well as BNP and chest x-ray reveals pulmonary edema. Patient currently denies chest pain at this time. Hospitalist service requested to admit the patient for further evaluation and management. Review of Systems: A comprehensive review of systems was negative except for that written in the History of Present Illness. Past Medical History:  
Diagnosis Date  Acute on chronic systolic HF (heart failure) ([address] 5/19/2018  Age-related osteoporosis without current pathological fracture 9/2/2009  Anemia 9/2/2009  Asymptomatic hyperuricemia 2/16/2017  Breast cancer ([address]  CAD (coronary artery disease) 6/21/2018  Carotid bruit 8/31/2011  CHF (congestive heart failure) ([address]  CKD (chronic kidney disease) stage 3, GFR 30-59 ml/min ([facility]) 10/25/2017  Colon cancer ([address] 9/2/2009  
 surgery/chemo  Colon cancer ([address] 9/2/2009  COVID-19   
 DM (diabetes mellitus) ([address] 9/2/2009  GERD (gastroesophageal reflux disease)  H/O: CVA 9/2/2009  
 slight l sided weakness  Heart attack ([address]  HTN, goal below 140/90 9/2/2009  Hypothyroid 9/2/2009  Ill-defined condition   
 seasonal allergies  Long-term use of immunosuppressant medication 11/21/2016  Metastatic breast cancer ([facility] 6/1/2018  Microalbuminuria 9/2/2009  Osteoporosis 9/2/2009  Other and unspecified hyperlipidemia 1/27/2010  PAD (peripheral artery disease) ([facility] 9/7/2011  Primary osteoarthritis of both knees 9/28/2016  Rheumatoid arthritis involving ankle ([facility] 9/28/2016  Rheumatoid arthritis(714.0) 9/2/2009  Seropositive rheumatoid arthritis of multiple sites ([facility] 9/28/2016  Statin intolerance 12/21/2016  Type 2 diabetes mellitus with neurologic complication, with long-term current use of insulin ([facility] 9/2/2009  Type 2 diabetes with nephropathy ([facility] 8/20/2018  Unspecified essential hypertension 9/2/2009  Vitamin D deficiency 6/16/2017  Weakness due to cerebrovascular accident Past Surgical History:  
Procedure Laterality Date  HX COLECTOMY  HX HYSTERECTOMY  HX OTHER SURGICAL    
 colonoscopies numerous since 1993 Prior to Admission medications Medication Sig Start Date End Date Taking? Authorizing Provider  
furosemide (LASIX) 40 mg tablet Take 1 tab by mouth daily or as advised by Dr. Mandie Juarez 7/1/20   Elder Damico MD  
clopidogreL (PLAVIX) 75 mg tab TAKE ONE TABLET BY MOUTH DAILY 6/2/20   Elder Damico MD  
cholecalciferol (Vitamin D3) 25 mcg (1,000 unit) cap Take 1,000 Units by mouth daily. Provider, Historical  
glucosam/wesley-msm1/C/bassam/bosw (OSTEO BI-FLEX TRIPLE STRENGTH PO) Take 2 Tabs by mouth daily. Rebecca Moss MD  
lecithin, soy 1,200 mg cap Take 1 Cap by mouth daily. Rebecca Moss MD  
evolocumab (Repatha SureClick) pen injection 411 mg by SubCUTAneous route every fourteen (14) days. Provider, Historical  
carvediloL (COREG) 12.5 mg tablet TAKE ONE TABLET BY MOUTH TWICE A DAY Patient taking differently: Take 6.25 mg by mouth two (2) times a day.  3/17/20   Elder Damico MD  
 insulin aspart U-100 (NOVOLOG FLEXPEN U-100 INSULIN) 100 unit/mL (3 mL) inpn by SubCUTAneous route Before breakfast, lunch, dinner and at bedtime. 2-4 units     Provider, Historical  
multivitamin (ONE A DAY) tablet Take 1 Tab by mouth daily. Provider, Historical  
insulin degludec (TRESIBA FLEXTOUCH U-100) 100 unit/mL (3 mL) inpn 12 Units by SubCUTAneous route daily. Provider, Historical  
nitroglycerin (NITROSTAT) 0.4 mg SL tablet 1 Tab by SubLINGual route as needed for Chest Pain. Up to 3 doses. Patient taking differently: 1 Tab by SubLINGual route every five (5) minutes as needed for Chest Pain. Up to 3 doses. 18   Trish Hall MD  
levothyroxine (SYNTHROID) 88 mcg tablet Take 88 mcg by mouth Daily (before breakfast). Provider, Historical  
aspirin delayed-release 81 mg tablet Take 81 mg by mouth daily. Provider, Historical  
OMEGA-3 FATTY ACIDS/FISH OIL (OMEGA 3 FISH OIL PO) Take 600 mg by mouth daily. Provider, Historical  
 
Allergies Allergen Reactions  Statins-Hmg-Coa Reductase Inhibitors Other (comments) Intolerant to statins  Sulfa (Sulfonamide Antibiotics) Other (comments)  
  syncope Family History Problem Relation Age of Onset  Diabetes Mother  Stroke Mother  Diabetes Sister  Other Sister   
     fell and hit her head -  of this  Diabetes Brother  Cancer Father   
     stomach  Diabetes Sister SOCIAL HISTORY: 
Patient resides at Home. Patient ambulates with walker. Smoking history: never Alcohol history: None FAMILY HISTORY: 
Diabetes Cancer Stroke Objective:  
 
 
Physical Exam:  
Visit Vitals /48 Pulse 89 Temp 97.5 °F (36.4 °C) Resp 19 SpO2 100% General:  Alert, cooperative, no distress, appears stated age. Head:  Normocephalic, without obvious abnormality, atraumatic. Eyes:  Conjunctivae/corneas clear. PERRL, EOMs intact. Fundi benign. Ears:  Normal TMs and external ear canals both ears. Nose: Nares normal. Septum midline. Mucosa normal. No drainage or sinus tenderness. Throat: Lips, mucosa, and tongue normal. Teeth and gums normal.  
Neck: Supple, symmetrical, trachea midline, no adenopathy, thyroid: no enlargement/tenderness/nodules, no carotid bruit and no JVD. Back:   Symmetric, no curvature. ROM normal. No CVA tenderness. Lungs:   Clear to auscultation bilaterally. Chest wall:  No tenderness or deformity. Heart:  Regular rate and rhythm, S1, S2 normal, no murmur, click, rub or gallop. Breast Exam:  No tenderness, masses, or nipple abnormality. Abdomen:   Soft, non-tender. Bowel sounds normal. No masses,  No organomegaly. Genitalia:  Normal female without lesion, discharge or tenderness. Rectal:  Normal tone,  no masses or tenderness Guaiac negative stool. Extremities: Extremities normal, atraumatic, no cyanosis or edema. Pulses: 2+ and symmetric all extremities. Skin: Skin color, texture, turgor normal. No rashes or lesions. Lymph nodes: Cervical, supraclavicular, and axillary nodes normal.  
Neurologic: CNII-XII intact. Normal strength, sensation and reflexes throughout. Cap refill: Brisk, less than 3 seconds Pulses: 2+, symmetric in all extremities ECG:   Sinus tachycardia, anteroseptal infarct, ST and T wave abnormality consider inferolateral ischemia. Data Review: All diagnostic labs and studies have been reviewed. Chest x-ray Mild diffuse interstitial opacities suggesting pulmonary edema. Stable scattered sclerotic bony metastasis. Radha Driscoll Assessment and Plan: 1. Acute respiratory failure 
-Acute hypoxic respiratory failure secondary to congestive heart failure 
-Currently on BiPAP, follow clinical course 2. Congestive heart failure 
-Acute on chronic congestive heart failure, last known EF of 40 to 45% 
-We will need to rule out MI given acute onset of her pulmonary edema -Plan admit to the hospital and start diuresis with 40 mg IV Lasix every 12 hours 
-Repeat echocardiogram, cardiology evaluation 3. Elevated troponin 
-Will trend troponin, rule out MI 
-History of coronary artery disease 
-Continue Plavix and beta-blocker 
-Cardiology to evaluate the patient 5. Hypertension 
-Continue Coreg and Lasix 
-Blood pressure stable 6. Diabetes 
-Hold basal insulin for now given patient on BiPAP with n.p.o. status 
-Insulin sliding scale coverage 
-Check hemoglobin A1c 
 
7. Hypothyroidism 
-Continue Synthroid 8. DVT prophylaxis 
-Lovenox and SCDs. Estimated length of stay is 2 midnights. FUNCTIONAL STATUS PRIOR TO HOSPITALIZATION Ambulatory with Use of Assistive Devices (including history of recent falls):  
 
Signed By: Keren Padilla MD   
 August 12, 2020

## 2020-08-12 NOTE — ED PROVIDER NOTES
Pt presents with SOB that began this morning. She has a past medical history for anemia,  coronary artery disease, congestive heart failure, chronic kidney disease, colon cancer, diabetes mellitus type 2, GERD, CVA, hypothyroidism, hypertension, and metastatic breast cancer. Was recently diagnosed with COVID-19 so no current chemo/radiation for cancer. Sees Dr Pillo Zuniga of cardiology. Reports home lasix and denies prior intubation/BIPAP use 2/2 to CHF exacerbation. Speaking in short sentences during my evaluation. Notes no recent illness. Was seen in the ER yesterday for a breast ulcer. Reports she went to bed last night and felt normal.  Woke up this morning and began having cough with clear sputum and shortness of breath that is progressed throughout the day. Son was with patient in triage and told RN that he was concern for flash pulmonary edema and patient required IV Lasix. Past Medical History:  
Diagnosis Date  Acute on chronic systolic HF (heart failure) ([address] 5/19/2018  Age-related osteoporosis without current pathological fracture 9/2/2009  Anemia 9/2/2009  Asymptomatic hyperuricemia 2/16/2017  Breast cancer ([address]  CAD (coronary artery disease) 6/21/2018  Carotid bruit 8/31/2011  CHF (congestive heart failure) ([address]  CKD (chronic kidney disease) stage 3, GFR 30-59 ml/min ([facility]) 10/25/2017  Colon cancer ([address] 9/2/2009  
 surgery/chemo  Colon cancer ([address] 9/2/2009  COVID-19   
 DM (diabetes mellitus) ([address] 9/2/2009  GERD (gastroesophageal reflux disease)  H/O: CVA 9/2/2009  
 slight l sided weakness  Heart attack ([address]  HTN, goal below 140/90 9/2/2009  Hypothyroid 9/2/2009  Ill-defined condition   
 seasonal allergies  Long-term use of immunosuppressant medication 11/21/2016  Metastatic breast cancer ([address] 6/1/2018  Microalbuminuria 9/2/2009  Osteoporosis 9/2/2009  Other and unspecified hyperlipidemia 1/27/2010  PAD (peripheral artery disease) ([facility] 2011  Primary osteoarthritis of both knees 2016  Rheumatoid arthritis involving ankle ([facility] 2016  Rheumatoid arthritis(714.0) 2009  Seropositive rheumatoid arthritis of multiple sites ([facility] 2016  Statin intolerance 2016  Type 2 diabetes mellitus with neurologic complication, with long-term current use of insulin ([facility] 2009  Type 2 diabetes with nephropathy ([facility] 2018  Unspecified essential hypertension 2009  Vitamin D deficiency 2017  Weakness due to cerebrovascular accident Past Surgical History:  
Procedure Laterality Date  HX COLECTOMY  HX HYSTERECTOMY  HX OTHER SURGICAL    
 colonoscopies numerous since  Family History:  
Problem Relation Age of Onset  Diabetes Mother  Stroke Mother  Diabetes Sister  Other Sister   
     fell and hit her head -  of this  Diabetes Brother  Cancer Father   
     stomach  Diabetes Sister Social History Socioeconomic History  Marital status:  Spouse name: Not on file  Number of children: Not on file  Years of education: Not on file  Highest education level: Not on file Occupational History  Not on file Social Needs  Financial resource strain: Not on file  Food insecurity Worry: Not on file Inability: Not on file  Transportation needs Medical: Not on file Non-medical: Not on file Tobacco Use  Smoking status: Never Smoker  Smokeless tobacco: Never Used Substance and Sexual Activity  Alcohol use: No  
 Drug use: No  
 Sexual activity: Not Currently Partners: Male Lifestyle  Physical activity Days per week: Not on file Minutes per session: Not on file  Stress: Not on file Relationships  Social connections Talks on phone: Not on file Gets together: Not on file Attends Jew service: Not on file Active member of club or organization: Not on file Attends meetings of clubs or organizations: Not on file Relationship status: Not on file  Intimate partner violence Fear of current or ex partner: Not on file Emotionally abused: Not on file Physically abused: Not on file Forced sexual activity: Not on file Other Topics Concern  Not on file Social History Narrative  Not on file ALLERGIES: Statins-hmg-coa reductase inhibitors and Sulfa (sulfonamide antibiotics) Review of Systems Constitutional: Negative for chills and fever. HENT: Negative for drooling and nosebleeds. Eyes: Negative for pain and itching. Respiratory: Positive for cough and shortness of breath. Negative for choking, wheezing and stridor. Cardiovascular: Negative for chest pain, palpitations and leg swelling. Gastrointestinal: Negative for abdominal distention and rectal pain. Endocrine: Negative for heat intolerance and polyphagia. Genitourinary: Negative for enuresis and genital sores. Musculoskeletal: Negative for arthralgias and joint swelling. Skin: Negative for color change. Allergic/Immunologic: Negative for immunocompromised state. Neurological: Negative for tremors and speech difficulty. Hematological: Negative for adenopathy. Psychiatric/Behavioral: Negative for dysphoric mood and sleep disturbance. Vitals:  
 08/12/20 1154 08/12/20 1156 BP: 165/79 146/80 Pulse: (!) 121 (!) 120 Resp: (!) 35 (!) 40 Temp: 97.5 °F (36.4 °C) SpO2: 98% 90% Physical Exam 
Vitals signs and nursing note reviewed. Constitutional:   
   General: She is in acute distress. Appearance: She is well-developed. HENT:  
   Head: Normocephalic and atraumatic. Nose: Nose normal.  
Eyes:  
   Conjunctiva/sclera: Conjunctivae normal.  
Neck: Musculoskeletal: Normal range of motion and neck supple. Comments: No JVD. Cardiovascular:  
   Rate and Rhythm: Regular rhythm. Tachycardia present. Heart sounds: Normal heart sounds. Pulmonary:  
   Effort: Pulmonary effort is normal. No respiratory distress. Comments: Dec BS in bases Abdominal:  
   General: There is no distension. Palpations: Abdomen is soft. Tenderness: There is no abdominal tenderness. Musculoskeletal: Normal range of motion. General: No deformity. Comments: Mild pedal edema BL Skin: 
   General: Skin is warm and dry. Neurological:  
   Mental Status: She is alert. Coordination: Coordination normal.  
Psychiatric:     
   Behavior: Behavior normal.  
 
  
 
MDM Number of Diagnoses or Management Options Acute on chronic congestive heart failure, unspecified heart failure type [facility]): Acute pulmonary edema [facility]):  
Elevated troponin: Hypoxia:  
SOB (shortness of breath):  
Diagnosis management comments: ED EKG interpretation: 
Rhythm: sinus tachycardia; and regular . Rate (approx.): 116; Axis: normal; ST/T wave: T wave inverted in lateral leads; No STEMI, no significant changes vs last EKG. Amount and/or Complexity of Data Reviewed Discussion of test results with the performing providers: yes Decide to obtain previous medical records or to obtain history from someone other than the patient: yes Obtain history from someone other than the patient: yes Review and summarize past medical records: yes Discuss the patient with other providers: yes Independent visualization of images, tracings, or specimens: yes Critical Care Total time providing critical care: 30-74 minutes Patient Progress Patient progress: improved ED Course as of Aug 12 1337 Wed Aug 12, 2020  
1335 Started on BiPAP, tolerating well. Son at bedside who reports patient has had flash pulmonary edema during prior hospitalizations and has used the BiPAP before.   Patient remains HD stable, no concern for unstable airway at this time. Lasix administered. We will continue to monitor.  
 [AL] ED Course User Index [AL] Clyde Jara MD  
 
 
Procedures Hospitalist Perfect Serve for Admission 1:38 PM 
 
ED Room Number: VH45/41 Patient Name and age:  Missy Diaz 82 y.o.  female Working Diagnosis: 1. Acute pulmonary edema (HCC) 2. Hypoxia 3. SOB (shortness of breath) 4. Elevated troponin 5. Acute on chronic congestive heart failure, unspecified heart failure type ([address] COVID-19 Suspicion:  no 
 
Code Status:  Full Code Readmission: no 
Isolation Requirements:  no 
Recommended Level of Care:  step down Department:[facility] Adult ED - [phone] Other:  Pt with acute exacerbation of CHF, flashed this AM. On BIPAP and improved symptoms. No chest pain, elevated troponin noted - no acute ischemia on EKG. Left breast ulcer at site of current breast cancer. Missed appt with surgical oncologist this AM. Patient is being admitted to the hospital.  The results of their tests and reasons for their admission have been discussed with them and/or available family. They convey agreement and understanding for the need to be admitted and for their admission diagnosis.

## 2020-08-12 NOTE — ED NOTES
Patient received discharge instructions by MD and RN. Reviewed discharge instructions with patient. Patient verbalized understanding of discharge teaching. Patient left ED via wheelchair with son. Patient reports relief of most intense pain.

## 2020-08-12 NOTE — PROGRESS NOTES
Pharmacist Admission Medication Reconciliation: 
 
Reviewed PTA medication list with Ms Jaye Snow son. He was an excellent historian who sounds very knowledgeable about her medication regimen. Rx Query data available? ¹ YES - incomplete Reviewed active and held orders. YES Medication notes: 
Coreg dose was unclear in PTA list, but son believes that 6.25 mg is correct. Updated Lasix - Usual dose 40 mg daily. Has been alternating between 40 and 60 mg daily for the last ~2 weeks. Took 60 mg this morning (8/12/20). Updated insulin regimen - Marceil Magdaleno daily plus Novolog with meals as needed - son mentioned that the patient is very sensitive to the Novolog, and gave the example that even with a blood sugar of 300, they only give her 3 units or she will become hypoglycemic. Notes recent chemo/radiation, as recent as May? Has not required the \"Magic Mouthwash\" for mouth sores since treatment was stopped. Thank you for allowing me to participate in this patient's care. Please call [phone] or [phone] with any questions. Pauline Walton, Pharmacist 
  
     [facility] pharmacy benefit data reflects medications filled and processed through the patient's insurance,  
however this data does NOT capture whether the medication was picked up or is currently being taken by the patient. Prior to Admission Medications:  
Prior to Admission Medications Prescriptions Last Dose Informant Taking? OMEGA-3 FATTY ACIDS/FISH OIL (OMEGA 3 FISH OIL PO)   Yes Sig: Take  by mouth daily. aspirin delayed-release 81 mg tablet 8/12/2020 at Unknown time  Yes Sig: Take 81 mg by mouth daily. carvediloL (COREG) 12.5 mg tablet 8/12/2020 at Unknown time  Yes Sig: TAKE ONE TABLET BY MOUTH TWICE A DAY Patient taking differently: Take 6.25 mg by mouth two (2) times a day. cholecalciferol (Vitamin D3) 25 mcg (1,000 unit) cap 8/12/2020 at Unknown time  Yes Sig: Take 1,000 Units by mouth daily. clopidogreL (PLAVIX) 75 mg tab 2020 at Unknown time  Yes Sig: TAKE ONE TABLET BY MOUTH DAILY  
evolocumab (Repatha SureClick) pen injection   Yes Si mg by SubCUTAneous route every fourteen (14) days. furosemide (Lasix) 40 mg tablet 2020 at Unknown time  Yes Sig: Take 40-60 mg by mouth every other day. Usual dose 40 mg daily. Has been alternating between 40 and 60 mg daily for ~2 weeks. Took 60 mg this morning (20). glucosam/wesley-msm1/C/bassam/bosw (OSTEO BI-FLEX TRIPLE STRENGTH PO)   Yes Sig: Take 2 Tabs by mouth daily. insulin aspart U-100 (NOVOLOG FLEXPEN U-100 INSULIN) 100 unit/mL (3 mL) inpn   No  
Sig: by SubCUTAneous route Before breakfast, lunch, dinner and at bedtime. 2-3 units depending on blood sugar before meals. (Note: son states very sensitive.)  
insulin degludec (TRESIBA FLEXTOUCH U-100) 100 unit/mL (3 mL) inpn 2020 at Unknown time  Yes Si Units by SubCUTAneous route daily. levothyroxine (SYNTHROID) 88 mcg tablet 2020 at Unknown time  Yes Sig: Take 88 mcg by mouth Daily (before breakfast). multivitamin (ONE A DAY) tablet 2020 at Unknown time  Yes Sig: Take 1 Tab by mouth daily. nitroglycerin (NITROSTAT) 0.4 mg SL tablet   No  
Si Tab by SubLINGual route as needed for Chest Pain. Up to 3 doses. Patient taking differently: 1 Tab by SubLINGual route every five (5) minutes as needed for Chest Pain. Up to 3 doses. Facility-Administered Medications: None Allergies:  Statins-hmg-coa reductase inhibitors and Sulfa (sulfonamide antibiotics)

## 2020-08-13 NOTE — CONSULTS
Consult received, chart reviewed. Patient has POST 2018 in which her son, Osiel Parry, requested his mother be DNR. Patient sleeping when I visited today with her older son, Lisseth Werner, at her side. Per Mr. Dimitrios Zazueta, his mother has out-lived her projected prognosis in 2018 of several months. She is no longer under the care of Dr. Tomas Redmond. Her sons sought a 2nd opinion and she has been seen by Dr. Chapin Sarabia at [facility] and has an appointments scheduled with [facility]. Her family wishes for her to be FULL code and to follow up with [facility] appointments. PM will sign off for now as care plans are clear.

## 2020-08-13 NOTE — PROGRESS NOTES
Heparin drip stopped overnight. I could not find any reason to the chart and also unable to contact you the night time coverage for hospitalist team.  Nurses do not know the reason for stopping the heparin drip. No evidence of bleeding so far. I will restart the heparin drip. If PTT is high then we can hold it but would not cancel the order ADDENDUM ,  
Nursing staff informed me that she was having bleeding from breast , will hold it

## 2020-08-13 NOTE — PROGRESS NOTES
Spiritual Care Assessment/Progress Note [address] 
 
 
NAME: Zena Chaney      MRN: [id number] AGE: 82 y.o. SEX: female Zoroastrianism Affiliation: Marcia Castellano Language: [place] 8/13/2020     Total Time (in minutes): 15 Spiritual Assessment begun in [facility] TELEMET[facility] through conversation with: 
  
    [x]Patient        [] Family    [] Friend(s) Reason for Consult: Palliative Care, Initial/Spiritual Assessment Spiritual beliefs: (Please include comment if needed) [x] Identifies with a uzma tradition:     
   [] Supported by a uzma community:        
   [] Claims no spiritual orientation:       
   [] Seeking spiritual identity:            
   [] Adheres to an individual form of spirituality:       
   [] Not able to assess:                   
 
    
Identified resources for coping:  
   [x] Prayer                           
   [] Music                  [] Guided Imagery [x] Family/friends                 [] Pet visits [] Devotional reading                         [] Unknown 
   [] Other:                                       
 
 
Interventions offered during this visit: (See comments for more details) Patient Interventions: Affirmation of emotions/emotional suffering, Affirmation of uzma, Catharsis/review of pertinent events in supportive environment, Coping skills reviewed/reinforced, Iconic (affirming the presence of God/Higher Power), Normalization of emotional/spiritual concerns, Prayer (actual), Prayer (assurance of) Plan of Care: 
 
 [] Support spiritual and/or cultural needs  
 [] Support AMD and/or advance care planning process    
 [] Support grieving process 
 [] Coordinate Rites and/or Rituals  
 [] Coordination with community clergy [] No spiritual needs identified at this time 
 [] Detailed Plan of Care below (See Comments)  [] Make referral to Music Therapy 
[] Make referral to Pet Therapy    
[] Make referral to Addiction services [] Make referral to [facility] 
[] Make referral to Spiritual Care Partner 
[] No future visits requested       
[x] Follow up visits as needed Initial visit with pt. Pt enjoys visits and conversation. She lives with her son, Luz Maria Berrios. She has three sons and reports all are supportive of her. One of her sons is expected around 12:30 and she is waiting for him to arrive and assist her with lunch. Offered bedside prayer at her request. Assured her of ongoing  support as needed. Chaplain Ezra, MDiv, MS, [facility] 
[phone])

## 2020-08-13 NOTE — ED PROVIDER NOTES
Patient is an 61-year-old woman with known stage IV breast cancer who presents with a new wound underneath her left breast.  Patient has a known tumor in the area and family noticed blood oozing from underneath her left breast earlier this evening. Patient denies increased pain, redness, tenderness, fevers, or chills. Past Medical History:  
Diagnosis Date  Acute on chronic systolic HF (heart failure) ([address] 5/19/2018  Age-related osteoporosis without current pathological fracture 9/2/2009  Anemia 9/2/2009  Asymptomatic hyperuricemia 2/16/2017  Breast cancer ([address]  CAD (coronary artery disease) 6/21/2018  Carotid bruit 8/31/2011  CHF (congestive heart failure) ([address]  CKD (chronic kidney disease) stage 3, GFR 30-59 ml/min ([facility] 10/25/2017  Colon cancer ([address] 9/2/2009  
 surgery/chemo  Colon cancer ([address] 9/2/2009  COVID-19   
 DM (diabetes mellitus) ([address] 9/2/2009  GERD (gastroesophageal reflux disease)  H/O: CVA 9/2/2009  
 slight l sided weakness  Heart attack ([address]  HTN, goal below 140/90 9/2/2009  Hypothyroid 9/2/2009  Ill-defined condition   
 seasonal allergies  Long-term use of immunosuppressant medication 11/21/2016  Metastatic breast cancer ([address] 6/1/2018  Microalbuminuria 9/2/2009  Osteoporosis 9/2/2009  Other and unspecified hyperlipidemia 1/27/2010  PAD (peripheral artery disease) ([address] 9/7/2011  Primary osteoarthritis of both knees 9/28/2016  Rheumatoid arthritis involving ankle ([address] 9/28/2016  Rheumatoid arthritis(714.0) 9/2/2009  Seropositive rheumatoid arthritis of multiple sites ([address] 9/28/2016  Statin intolerance 12/21/2016  Type 2 diabetes mellitus with neurologic complication, with long-term current use of insulin ([address] 9/2/2009  Type 2 diabetes with nephropathy ([address] 8/20/2018  Unspecified essential hypertension 9/2/2009  Vitamin D deficiency 6/16/2017  Weakness due to cerebrovascular accident Past Surgical History:  
Procedure Laterality Date  HX COLECTOMY  HX HYSTERECTOMY  HX OTHER SURGICAL    
 colonoscopies numerous since  Family History:  
Problem Relation Age of Onset  Diabetes Mother  Stroke Mother  Diabetes Sister  Other Sister   
     fell and hit her head -  of this  Diabetes Brother  Cancer Father   
     stomach  Diabetes Sister Social History Socioeconomic History  Marital status:  Spouse name: Not on file  Number of children: Not on file  Years of education: Not on file  Highest education level: Not on file Occupational History  Not on file Social Needs  Financial resource strain: Not on file  Food insecurity Worry: Not on file Inability: Not on file  Transportation needs Medical: Not on file Non-medical: Not on file Tobacco Use  Smoking status: Never Smoker  Smokeless tobacco: Never Used Substance and Sexual Activity  Alcohol use: No  
 Drug use: No  
 Sexual activity: Not Currently Partners: Male Lifestyle  Physical activity Days per week: Not on file Minutes per session: Not on file  Stress: Not on file Relationships  Social connections Talks on phone: Not on file Gets together: Not on file Attends Worship service: Not on file Active member of club or organization: Not on file Attends meetings of clubs or organizations: Not on file Relationship status: Not on file  Intimate partner violence Fear of current or ex partner: Not on file Emotionally abused: Not on file Physically abused: Not on file Forced sexual activity: Not on file Other Topics Concern  Not on file Social History Narrative  Not on file ALLERGIES: Statins-hmg-coa reductase inhibitors and Sulfa (sulfonamide antibiotics) Review of Systems Constitutional: Negative for chills and fever. Respiratory: Negative for shortness of breath. Cardiovascular: Negative for chest pain. Gastrointestinal: Negative for abdominal pain, constipation, diarrhea and vomiting. Skin: Positive for wound. Neurological: Negative for dizziness and light-headedness. All other systems reviewed and are negative. Vitals:  
 08/11/20 1900 08/11/20 2039 BP: 155/75 Pulse: 98 Resp: 16 Temp: 97.8 °F (36.6 °C) SpO2: 97% 97% Physical Exam 
Vitals signs and nursing note reviewed. Constitutional:   
   Appearance: She is well-developed. She is ill-appearing. HENT:  
   Head: Normocephalic and atraumatic. Eyes:  
   Pupils: Pupils are equal, round, and reactive to light. Neck: Musculoskeletal: Normal range of motion and neck supple. Cardiovascular:  
   Rate and Rhythm: Normal rate and regular rhythm. Pulmonary:  
   Effort: Pulmonary effort is normal.  
   Breath sounds: Normal breath sounds. Abdominal:  
   General: There is no distension. Palpations: Abdomen is soft. Tenderness: There is no abdominal tenderness. Skin: 
   General: Skin is warm and dry. Capillary Refill: Capillary refill takes less than 2 seconds. Comments: Breast exam demonstrates a fungating tumor with associated skin puckering underneath the left breast with some associated mild oozing. No induration, erythema, tenderness, or other signs or symptoms of infection. Bleeding is well controlled in the emergency department Neurological:  
   General: No focal deficit present. Mental Status: She is alert and oriented to person, place, and time. Psychiatric:     
   Mood and Affect: Mood normal.     
   Behavior: Behavior normal.  
 
  
 
MDM Number of Diagnoses or Management Options Ulcer of skin of breast:  
Diagnosis management comments:  The patient is now resting comfortably and feels better, is alert, is not toxic, and is in no distress. The patient has a normal mental status and is neurologically intact. The patient appears well, has no fever, altered mental status or signs of systemic toxicity. The history, exam, diagnostic testing (if any) and current condition do not demonstrate signs of sepsis, or other significant systemic illness requiring further treatment, testing or consultation in the emergency department. The vital signs have been stable. The patient's condition is stable and appropriate for discharge. The patient will pursue further outpatient evaluation with the primary care physician or other designated or consulting physician as indicated in the discharge instructions. Her wound was dressed prior to discharge and she was provided with wound care supplies until she can follow-up with her oncologist for definitive management. There is no evidence of cellulitis or abscess. Procedures The patient's results have been reviewed with them and/or available family. Patient and/or family verbally conveyed their understanding and agreement of the patient's signs, symptoms, diagnosis, treatment and prognosis and additionally agree to follow up as recommended in the discharge instructions or to return to the Emergency Room should their condition change prior to their follow-up appointment. The patient/family verbally agrees with the care-plan and verbally conveys that all of their questions have been answered. The discharge instructions have also been provided to the patient and/or family with some educational information regarding the patient's diagnosis as well a list of reasons why the patient would want to return to the ER prior to their follow-up appointment, should their condition change.

## 2020-08-13 NOTE — PROGRESS NOTES
Problem: Pressure Injury - Risk of 
Goal: *Prevention of pressure injury Description: Document Calderon Scale and appropriate interventions in the flowsheet. Outcome: Progressing Towards Goal 
Note: Pressure Injury Interventions: 
Sensory Interventions: Assess changes in LOC Moisture Interventions: Absorbent underpads, Minimize layers Activity Interventions: Assess need for specialty bed, PT/OT evaluation Mobility Interventions: Assess need for specialty bed, PT/OT evaluation Nutrition Interventions: Document food/fluid/supplement intake Friction and Shear Interventions: Minimize layers

## 2020-08-13 NOTE — PROGRESS NOTES
[address] [facility] Adult  Hospitalist Group Hospitalist Progress Note Jerry Disla MD 
Answering service: [phone] OR [phone] from in house phone Date of Service:  2020 NAME:  Preethi Ford :  1935 MRN:  [id number] Admission Summary: Preethi Ford is a 82 y.o. female who presents for evaluation of shortness of breath. Patient has history of congestive heart failure, last echocardiogram in 2020 with EF of 40 to 45%. She is currently on Lasix 40 mg daily which she states has been compliant with. She denies intake of excessive salt. Patient was doing fine yesterday and went to bed. This morning when she woke up, patient had acute onset shortness of breath and certainly presented to the emergency room. Patient was subsequently put on BiPAP. Further work-up reveals a patient with mild elevation of troponin as well as BNP and chest x-ray reveals pulmonary edema. Patient currently denies chest pain at this time. Hospitalist service requested to admit the patient for further evaluation and management. Interval history / Subjective:  
Patient seen and examined. Chart and labs reviewed Answered all questions to the son bedside She is not having any chest pain. She is complaining of leg pain instead bilaterally Assessment & Plan: 1. Acute hypoxic respiratory failure 
-Acute hypoxic respiratory failure secondary to congestive heart failure Echo pending Off bipap and now comfortable on nasal cannula. Continue Lasix IV twice daily 
  
2. Acute on chronic systolic Congestive heart failure 
-Acute on chronic congestive heart failure, last known EF of 40 to 45% Continue to diurese with Lasix Cardiology on board 
  
3.  Elevated troponin likely non-STEMI? But not sure at this point 
-History of coronary artery disease -Continue Plavix and beta-blocker and medically treat her  
heparin stopped 5. Hypertension 
-Continue Coreg and Lasix 
-Blood pressure stable 
  
6. Diabetes -Insulin sliding scale coverage 8.8 hemoglobin A1c 
  
7. Hypothyroidism 
-Continue Synthroid 
  
8 Metastatic breast cancer with bleeding left breast mass. Breast surgeon saw the patient Local wound care For the management per [facility] oncologist Dr. Mick Wagner Code status: full DVT prophylaxis: scd Care Plan discussed with: Patient/Family Anticipated Disposition: Home w/Family Anticipated Discharge: Less than 24 hours Hospital Problems  Date Reviewed: 7/23/2020 Codes Class Noted POA  
 CHF (congestive heart failure) ([facility] ICD-10-CM: I50.9 ICD-9-CM: 428.0  8/12/2020 Unknown Review of Systems: A comprehensive review of systems was negative except for that written in the HPI. Vital Signs:  
 Last 24hrs VS reviewed since prior progress note. Most recent are: 
Visit Vitals /71 (BP 1 Location: Left arm, BP Patient Position: At rest) Pulse 89 Temp 97.8 °F (36.6 °C) Resp 18 Ht 5' 1\" (1.549 m) Wt 59 kg (130 lb) SpO2 100% BMI 24.56 kg/m² Intake/Output Summary (Last 24 hours) at 8/13/2020 0831 Last data filed at 8/13/2020 3036 Gross per 24 hour Intake 240 ml Output 1850 ml Net -1610 ml Physical Examination:  
 
 
     
Constitutional:  Looks comfortable ENT:  Oral mucosa moist, oropharynx benign. Resp:  CTA bilaterally. No wheezing/rhonchi/rales. No accessory muscle use CV:  Regular rhythm, normal rate, no murmurs, gallops, rubs left breast has a palpable mass with small scab which has blood on it. GI:  Soft, non distended, non tender. normoactive bowel sounds, no hepatosplenomegaly Musculoskeletal:  No edema, warm, 2+ pulses throughout Neurologic:  Moves all extremities. AAOx3, CN II-XII reviewed Data Review: Review and/or order of clinical lab test 
 
 
Labs:  
 
Recent Labs 08/12/20 1917 08/12/20 
1201 WBC 5.7 7.0 HGB 8.3* 10.0* HCT 28.0* 33.2*  
 304 Recent Labs 08/13/20 
0434 08/12/20 
1201 08/11/20 1927  134* 137  
K 3.6 5.2* 3.8  104 103 CO2 26 21 24 BUN 38* 38* 45* CREA 1.23* 1.38* 1.35* * 339* 251* CA 8.4* 9.3 9.4 Recent Labs 08/12/20 
1201 08/11/20 1927 ALT 34 29 * 350* TBILI 0.6 0.4 TP 8.2 7.5 ALB 2.7* 2.7*  
GLOB 5.5* 4.8* Recent Labs 08/13/20 0434 08/12/20 1917 APTT 32.5* 31.7 No results for input(s): FE, TIBC, PSAT, FERR in the last 72 hours. No results found for: FOL, RBCF No results for input(s): PH, PCO2, PO2 in the last 72 hours. Recent Labs 08/12/20 1917 08/12/20 
1625 08/12/20 
1201 TROIQ 4.35* 2.44* 0.16* Lab Results Component Value Date/Time Cholesterol, total 74 04/16/2018 04:21 AM  
 HDL Cholesterol 25 04/16/2018 04:21 AM  
 LDL, calculated 31.6 04/16/2018 04:21 AM  
 Triglyceride 87 04/16/2018 04:21 AM  
 CHOL/HDL Ratio 3.0 04/16/2018 04:21 AM  
 
Lab Results Component Value Date/Time Glucose (POC) 149 (H) 08/13/2020 07:48 AM  
 Glucose (POC) 436 (H) 08/12/2020 09:59 PM  
 Glucose (POC) 269 (H) 08/11/2020 08:14 PM  
 Glucose (POC) 269 (H) 04/27/2020 11:18 AM  
 Glucose (POC) 130 (H) 04/27/2020 07:09 AM  
 
Lab Results Component Value Date/Time  Color YELLOW/STRAW 07/16/2020 03:02 PM  
 Appearance CLEAR 07/16/2020 03:02 PM  
 Specific gravity 1.007 07/16/2020 03:02 PM  
 pH (UA) 6.5 07/16/2020 03:02 PM  
 Protein TRACE (A) 07/16/2020 03:02 PM  
 Glucose Negative 07/16/2020 03:02 PM  
 Ketone Negative 07/16/2020 03:02 PM  
 Bilirubin Negative 07/16/2020 03:02 PM  
 Urobilinogen 0.2 07/16/2020 03:02 PM  
 Nitrites Negative 07/16/2020 03:02 PM  
 Leukocyte Esterase MODERATE (A) 07/16/2020 03:02 PM  
 Epithelial cells FEW 07/16/2020 03:02 PM  
 Bacteria Negative 07/16/2020 03:02 PM  
 WBC 20-50 07/16/2020 03:02 PM  
 RBC 0-5 07/16/2020 03:02 PM  
 
 
 
Medications Reviewed:  
 
Current Facility-Administered Medications Medication Dose Route Frequency  heparin 25,000 units in D5W 250 ml infusion  12-25 Units/kg/hr IntraVENous TITRATE  aspirin delayed-release tablet 81 mg  81 mg Oral DAILY  carvediloL (COREG) tablet 6.25 mg  6.25 mg Oral BID  cholecalciferol (VITAMIN D3) (1000 Units /25 mcg) tablet 1 Tab  1,000 Units Oral DAILY  clopidogreL (PLAVIX) tablet 75 mg  75 mg Oral DAILY  . PHARMACY TO SUBSTITUTE PER PROTOCOL (Reordered from: evolocumab (Repatha SureClick) pen injection)    Per Protocol  levothyroxine (SYNTHROID) tablet 88 mcg  88 mcg Oral ACB  therapeutic multivitamin (THERAGRAN) tablet 1 Tab  1 Tab Oral DAILY  nitroglycerin (NITROSTAT) tablet 0.4 mg  0.4 mg SubLINGual PRN  
 furosemide (LASIX) injection 40 mg  40 mg IntraVENous Q12H  
 insulin lispro (HUMALOG) injection   SubCUTAneous AC&HS  
 glucose chewable tablet 16 g  4 Tab Oral PRN  
 glucagon (GLUCAGEN) injection 1 mg  1 mg IntraMUSCular PRN  
 dextrose 10% infusion 0-250 mL  0-250 mL IntraVENous PRN  
 
______________________________________________________________________ EXPECTED LENGTH OF STAY: - - - 
ACTUAL LENGTH OF STAY:          1 Rita Biggs MD

## 2020-08-13 NOTE — NURSE NAVIGATOR
Chart reviewed by Heart Failure Nurse Navigator. Heart Failure database completed. EF:  Prior echo 4/20/20 ef 40/45%; repeat echo pending ACEi/ARB/ARNi: ** 
 
BB: coreg 6.25 mg twice daily Aldosterone Antagonist: ** 
 
Obstructive Sleep Apnea Screening: on CPAP 
 STOP-BANG score: 
 Referred to Sleep Medicine: CRT:  Not currently indicated NYHA Functional Class documentation requested. Heart Failure Teach Back in Patient Education. Heart Failure Avoiding Triggers on Discharge Instructions. Cardiologist: CAV Post discharge follow up phone call to be made within 48-72 hours of discharge.

## 2020-08-13 NOTE — PROGRESS NOTES
Consulted for CHF and troponin elevation, she has metastatic breast cancer and severe diffuse coronary disease by cath 2018 not felt to be a candidate for CABG or PCI, known CKD and reduced LV function. Agree with IV diuretics, would not continue IV heparin since cath not indicated, would also not do serial troponins. Will have Cardiology team see officially tomorrow.

## 2020-08-13 NOTE — CONSULTS
Cardiology Consult Note Patient Name: Louise Higgins  : 1935 MRN: [id number] Date: 2020  Time: 1:52 PM 
 
Admit Diagnosis: CHF (congestive heart failure) ([facility] [I50.9] Primary Cardiologist: Miguel Smith MD    Consulting Cardiologist: Miguel Smith MD 
 
Reason for Consult: CHF Requesting MD: Dunia Stone MD 
 
HPI: 
Louise Higgins is a 82 y.o. female admitted on 2020  for CHF (congestive heart failure) ([facility] [I50.9].    has a past medical history of Acute on chronic systolic HF (heart failure) ([address] (2018), Age-related osteoporosis without current pathological fracture (2009), Anemia (2009), Asymptomatic hyperuricemia (2017), Breast cancer ([facility] CAD (coronary artery disease) (2018), Carotid bruit (2011), CHF (congestive heart failure) ([facility] CKD (chronic kidney disease) stage 3, GFR 30-59 ml/min ([facility] (10/25/2017), Colon cancer ([address] (2009), Colon cancer ([address] (2009), COVID-19, DM (diabetes mellitus) ([address] (2009), GERD (gastroesophageal reflux disease), H/O: CVA (2009), Heart attack ([address] HTN, goal below 140/90 (2009), Hypothyroid (2009), Ill-defined condition, Long-term use of immunosuppressant medication (2016), Metastatic breast cancer ([address] (2018), Microalbuminuria (2009), Osteoporosis (2009), Other and unspecified hyperlipidemia (2010), PAD (peripheral artery disease) ([address] (2011), Primary osteoarthritis of both knees (2016), Rheumatoid arthritis involving ankle ([facility] (2016), Rheumatoid arthritis(714.0) (2009), Seropositive rheumatoid arthritis of multiple sites ([facility] (2016), Statin intolerance (2016), Type 2 diabetes mellitus with neurologic complication, with long-term current use of insulin ([facility] (2009), Type 2 diabetes with nephropathy ([facility] (2018), Unspecified essential hypertension (2009), Vitamin D deficiency (6/16/2017), and Weakness due to cerebrovascular accident. Presented to the ED for SOB. Sudden in onset. Son in room for additional history. She awoke yesterday with increased SOB. Initially placed on BIPAP in the ED. Given IV Lasix. ProBNP up a little. Trop increased. Mild edema on CXR. Extensive history as above. She notes her aid at home found her breast bleeding. Subjective: 
Feeling just a little better. Still with SOB. No CP. Bleeding from breast related to BRCA tumor erosion. Assessment and Plan 1. Acute on chronic systolic HF 
 - EF 83% on echo. Primary team repeating 
 - NYHA class IV 
 - IV Lasix - Coreg 6.25 mg BID 
 - No ACE/ARB/ANi with CKD 2. CAD 
 - Trop to 4.35.   
 - No c/o CP. Minor EKG changes - Severe diffuse coronary disease by cath 2018 not felt to be a candidate for CABG or PCI 
 - Continue ASA, Plavix and Coreg 3. BrCa 
 - metastatic breast cancer with active liver lesion - Tumor erosion of left breast, causing bleeding 
 - Has scheduled appt at [facility] for possible excision 4. CKD 
 - stage III 
 - stable 5. HTN 
 - Coreg, Lasix PTA 6. DM II 
 - per Primary team 
7. Hypothyroidism - Synthroid PTA 8. COVID 19 
 - she was confirmed + for COVID in June of 2020, by swab Decompensation of HF. Troponin elevation. Not a candidate for further cardiac interventions. Continue IV Lasix for clinical symptoms. Echo pending. BrCa with erosion of tumor to left breast. 
She need no further cardiac testing. Will follow up echo results. Cardiology attending: seen and examined. Agree with assess and plan Feels better, comfortable on low flow oxygen. No cp. Chest bibasilar rales, cv rrr 2/6 as murmur, ext no edema. Unclear whether ischemia caused chf or hypoxia causes trop elevation. Would continue usual meds and treat with iv lasix for now, hopefully bp and renal function will tolerate.  
 
 
Patient Active Problem List  
 Diagnosis Code  Type 2 diabetes mellitus with neurologic complication, with long-term current use of insulin ([facility]) E11.49, Z79.4  Colon cancer ([facility] C18.9  Age-related osteoporosis without current pathological fracture M81.0  
 HTN, goal below 140/90 I10  Anemia D64.9  Hyperlipidemia E78.5  Carotid bruit R09.89  
 PAD (peripheral artery disease) ([facility]) I73.9  Weakness due to cerebrovascular accident [id number]  Seropositive rheumatoid arthritis of multiple sites ([facility] M05.79  
 Primary osteoarthritis of both knees M17.0  Long-term use of immunosuppressant medication Z79.899  Statin intolerance Z78.9  Asymptomatic hyperuricemia E79.0  Vitamin D deficiency E55.9  CKD (chronic kidney disease) stage 3, GFR 30-59 ml/min ([facility]) N18.3  Metastatic breast cancer ([facility] C50.919  
 CAD (coronary artery disease) I25.10  Type 2 diabetes with nephropathy ([facility]) E11.21  
 Dizziness R42  Septic shock ([facility]) A41.9, R65.21  
 UTI (urinary tract infection) N39.0  Bacteremia due to Gram-negative bacteria R78.81  
 CHF (congestive heart failure) ([facility]) I50.9 6/18 echo lvef 30%, trivial pericardial effusion 12/25/19 mildly dilated lv with lvef 30-35%, lae, mod mr, mild to mod as 
 
 
Review of Systems:   
[] Patient unable to provide secondary to condition 
 
[x] All systems negative, except as checked below. Constitutional:   
[]Weight Change  []Fever   []Chills   []Night Sweats  []Fatigue  []Malaise  []____ 
ENT/Mouth:    
[]Hearing Changes  []Ear Pain  []Nasal Congestion   []Sinus Pain  []Hoarseness []Sore throat  []Rhinorrhea  []Swallowing Difficulty  []____ Eyes:   
[]Eye Pain  []Swelling  []Redness  []Foreign Body  []Discharge  []Vision Changes  []____ Cardiovascular:   
[]Chest Pain  [x]SOB  []PND  []REDDY  []Orthopnea  []Claudication  []Edema  
[]Palpitations  []____ Respiratory:   
[]Cough  []Sputum  []Wheezing,  []SOB  []Hemoptysis  []____ Gastrointestinal: []Nausea  []Vomiting  []Diarrhea  []Constipation  []Pain  []Heartburn  []Anorexia []Dysphagia  []Hematochezia  []Melena,  []Jaundice  []____ Genitourinary:   
[]Dysuria  []Urinary Frequency  []Hematuria  []Urinary Incontinence  []Urgency []Flank Pain  []Hesitancy  []____ Musculoskeletal:   
[]Arthralgias  []Myalgias  []Joint Swelling  []Joint Stiffness  []Back Pain  []Neck Pain  []____ Skin:   
[]Skin Lesions  []Pruritis  []Hair Changes  []Skin rashes  []____ Neuro:   
[]Weakness  []Numbness  []Paresthesias  []Loss of Consciousness  []Syncope  
[]Dizziness  []Headache  []Coordination Changes  []Recent Falls  []____ Psych:   
[]Anxiety/Depression  []Insomnia  []Memory Changes  []Violence/Abuse Hx.  []____ Heme/Lymph:   
[]Bruising  []Bleeding  []Lymphadenopathy  []____ Endocrine:   
[]Polyuria  []Polydipsia  []Temperature Intolerance  []____ Previous treatment/evaluation includes Cardiac catheterization, Echocardiogram - TTE/JIMMIE Cardiac risk factors:  
diabetes mellitus, sedentary life style, hypertension. Past Medical History:  
Diagnosis Date  Acute on chronic systolic HF (heart failure) ([facility] 5/19/2018  Age-related osteoporosis without current pathological fracture 9/2/2009  Anemia 9/2/2009  Asymptomatic hyperuricemia 2/16/2017  Breast cancer ([facility]  CAD (coronary artery disease) 6/21/2018  Carotid bruit 8/31/2011  CHF (congestive heart failure) ([facility]  CKD (chronic kidney disease) stage 3, GFR 30-59 ml/min ([facility]) 10/25/2017  Colon cancer ([facility] 9/2/2009  
 surgery/chemo  Colon cancer ([facility] 9/2/2009  COVID-19   
 DM (diabetes mellitus) ([facility] 9/2/2009  GERD (gastroesophageal reflux disease)  H/O: CVA 9/2/2009  
 slight l sided weakness  Heart attack ([facility]  HTN, goal below 140/90 9/2/2009  Hypothyroid 9/2/2009  Ill-defined condition   
 seasonal allergies  Long-term use of immunosuppressant medication 11/21/2016  Metastatic breast cancer ([facility] 6/1/2018  Microalbuminuria 9/2/2009  Osteoporosis 9/2/2009  Other and unspecified hyperlipidemia 1/27/2010  PAD (peripheral artery disease) ([facility] 9/7/2011  Primary osteoarthritis of both knees 9/28/2016  Rheumatoid arthritis involving ankle ([facility] 9/28/2016  Rheumatoid arthritis(714.0) 9/2/2009  Seropositive rheumatoid arthritis of multiple sites ([facility] 9/28/2016  Statin intolerance 12/21/2016  Type 2 diabetes mellitus with neurologic complication, with long-term current use of insulin ([facility] 9/2/2009  Type 2 diabetes with nephropathy ([facility] 8/20/2018  Unspecified essential hypertension 9/2/2009  Vitamin D deficiency 6/16/2017  Weakness due to cerebrovascular accident Past Surgical History:  
Procedure Laterality Date  HX COLECTOMY  HX HYSTERECTOMY  HX OTHER SURGICAL    
 colonoscopies numerous since 1993 Current Facility-Administered Medications Medication Dose Route Frequency  acetaminophen (TYLENOL) tablet 650 mg  650 mg Oral Q6H PRN  
 aspirin delayed-release tablet 81 mg  81 mg Oral DAILY  carvediloL (COREG) tablet 6.25 mg  6.25 mg Oral BID  cholecalciferol (VITAMIN D3) (1000 Units /25 mcg) tablet 1 Tab  1,000 Units Oral DAILY  clopidogreL (PLAVIX) tablet 75 mg  75 mg Oral DAILY  . PHARMACY TO SUBSTITUTE PER PROTOCOL (Reordered from: evolocumab (Repatha SureClick) pen injection)    Per Protocol  levothyroxine (SYNTHROID) tablet 88 mcg  88 mcg Oral ACB  therapeutic multivitamin (THERAGRAN) tablet 1 Tab  1 Tab Oral DAILY  nitroglycerin (NITROSTAT) tablet 0.4 mg  0.4 mg SubLINGual PRN  
 furosemide (LASIX) injection 40 mg  40 mg IntraVENous Q12H  
 insulin lispro (HUMALOG) injection   SubCUTAneous AC&HS  
 glucose chewable tablet 16 g  4 Tab Oral PRN  
 glucagon (GLUCAGEN) injection 1 mg  1 mg IntraMUSCular PRN  
 dextrose 10% infusion 0-250 mL  0-250 mL IntraVENous PRN Allergies Allergen Reactions  Statins-Hmg-Coa Reductase Inhibitors Other (comments) Intolerant to statins  Sulfa (Sulfonamide Antibiotics) Other (comments)  
  syncope Family History Problem Relation Age of Onset  Diabetes Mother  Stroke Mother  Diabetes Sister  Other Sister   
     fell and hit her head -  of this  Diabetes Brother  Cancer Father   
     stomach  Diabetes Sister Social History Socioeconomic History  Marital status:  Spouse name: Not on file  Number of children: Not on file  Years of education: Not on file  Highest education level: Not on file Tobacco Use  Smoking status: Never Smoker  Smokeless tobacco: Never Used Substance and Sexual Activity  Alcohol use: No  
 Drug use: No  
 Sexual activity: Not Currently Partners: Male Objective: 
  Physical Exam 
 
Vitals:  
Vitals:  
 20 0443 20 0627 20 0805 20 1110 BP:   111/71 118/64 Pulse:   89 92 Resp:   18 20 Temp:   97.8 °F (36.6 °C) 99.3 °F (37.4 °C) SpO2:  98% 100% 100% Weight: 130 lb (59 kg) Height:      
 
 
General:    Alert, cooperative, no distress, appears stated age. Neck:   Supple, no JVD. Back:     Symmetric, mild curvature. Lungs:     Crackles in bases to auscultation bilaterally. Heart[de-identified]    Regular rate and rhythm, S1, S2 normal, as murmur, click, rub or gallop. Abdomen:     Soft, non-tender. Bowel sounds normal.   
Extremities:   Extremities normal, atraumatic, no cyanosis or edema. Vascular:   Pulses - 2+ radials Skin:   Skin color normal. No rashes or lesions Neurologic:   CN II-XII grossly intact. Telemetry: normal sinus rhythm ECG:  
EKG Results Procedure 720 Value Units Date/Time EKG, 12 LEAD, INITIAL [[id number]] Collected:  20 Order Status:  Completed Updated:  20 1127   Ventricular Rate 91 BPM   
  Atrial Rate 91 BPM   
 P-R Interval 162 ms QRS Duration 90 ms Q-T Interval 368 ms QTC Calculation (Bezet) 452 ms Calculated P Axis 67 degrees Calculated R Axis 23 degrees Calculated T Axis 141 degrees Diagnosis --  
  Normal sinus rhythm Septal infarct (cited on or before 25-DEC-2019) ST & T wave abnormality, consider lateral ischemia When compared with ECG of 12-AUG-2020 11:58, Nonspecific T wave abnormality has replaced inverted T waves in Inferior  
leads Confirmed by Deborah Dandy, M.D., Ana Maria Nunez ([id number]) on 8/13/2020 11:27:31 AM 
  
 EKG, 12 LEAD, INITIAL [[id number]] Collected:  08/12/20 1158 Order Status:  Completed Updated:  08/12/20 1524 Ventricular Rate 116 BPM   
  Atrial Rate 116 BPM   
  P-R Interval 158 ms QRS Duration 86 ms   
  Q-T Interval 320 ms QTC Calculation (Bezet) 444 ms Calculated P Axis 56 degrees Calculated R Axis 60 degrees Calculated T Axis 169 degrees Diagnosis --  
  Sinus tachycardia Anteroseptal infarct (cited on or before 25-DEC-2019) ST & T wave abnormality, consider inferolateral ischemia When compared with ECG of 26-MAY-2020 11:33, 
Questionable change in QRS axis ST now depressed in Inferior leads T wave inversion now evident in Inferior leads T wave inversion less evident in Lateral leads Confirmed by Jalyn Deleon MD, Jessica Gomez ([id number]) on 8/12/2020 3:24:21 PM 
  
  
 
 
 
Data Review:  
 
Radiology: XR Results (most recent): 
[place] from [facility] [place] on 08/12/20 XR CHEST PORT Narrative EXAM:  XR CHEST PORT INDICATION: Shortness of breath COMPARISON: 7/16/2020 TECHNIQUE: Portable AP semiupright chest view at 1219 hours FINDINGS: The cardiomediastinal contours are stable. There is mild diffuse interstitial opacity. A calcified granuloma in the right 
upper lung is again noted. There is no pleural effusion or pneumothorax. Scattered sclerotic bony metastases are again noted. The upper abdomen is 
unremarkable. Impression IMPRESSION: 
 
Mild diffuse interstitial opacities suggesting pulmonary edema. Stable scattered 
sclerotic bony metastases. Recent Labs 08/12/20 1917 08/12/20 
1625 08/12/20 
1201 TROIQ 4.35* 2.44* 0.16* Recent Labs 08/13/20 
0434 08/12/20 
1201  134* K 3.6 5.2*  
 104 CO2 26 21 BUN 38* 38* CREA 1.23* 1.38* * 339* CA 8.4* 9.3 Recent Labs 08/12/20 1917 08/12/20 
1201 WBC 5.7 7.0 HGB 8.3* 10.0* HCT 28.0* 33.2*  
 304 Recent Labs 08/12/20 
1201 08/11/20 
1927 * 350* No results for input(s): CHOL, LDLC in the last 72 hours. No lab exists for component: TGL, HDLC,  HBA1C No results for input(s): CRP, TSH, TSHEXT in the last 72 hours. No lab exists for component: ESR Katharina Suresh. MASOUD Fuchs MD 
 
 
   [facility], [address] [facility] 
   [phone] CC: Tasneem Millard MD

## 2020-08-13 NOTE — PROGRESS NOTES
Transition of Care RUR: 38% Medicare pt has received, reviewed, and signed 1st IM letter informing them of their right to appeal the discharge. Signed copy has been placed on pt bedside chart. CM met with pleasant patient and supportive son Chay Grier- [phone]) at bedside. Patient is alert and oriented X 3. Patient lives at home with son, Cirilo Tariq. Patients home has a few steps to enter (which son stated she needs help getting up the stairs). Patient states she lives on the 1st floor of home. Patients son states they have daily hired caregivers during the day. Patient states she uses a walker to ambulate at home and a wheelchair when leaving the home. Patient states she does not use home O2. Son stated patient uses a CPAP nightly. Patients PCP is Dr. Alton Archibald and last tele visit was in July 2020. Patients preferred pharmacy is [facility] on [address] . Patients son stated they have had [facility] in the past for PT/OT. Reason for Admission:   Congestive heart failure RUR Score:   38% PCP: First and Last name: Dr. Alton Archibald Name of Practice: 
 Are you a current patient: Yes/No: YES Approximate date of last visit: July 2020 Can you do a virtual visit with your PCP: yes Resources/supports as identified by patient/family:  Patient uses a walker, wheelchair (when outside), CPAP (nightly), Top Challenges facing patient (as identified by patient/family and CM): Finances/Medication cost?    None; uses [address]. Transportation? None; supportive sons provide transporation Support system or lack thereof? Lives with supportive son, Radha Willis; patient has two other sons; patients son states patients has daily hired caregiver for a few hours a day Living arrangements?  Lives on the first floor of a home with supportive son; uses walker and wheelchair Self-care/ADLs/Cognition? Alert x 3; has hired caregiver to help with ADLs; patients is not completely independent with ADLs Current Advanced Directive/Advance Care Plan:  Has ACP docs and Full code Plan for utilizing home health: no CM HH orders at this time; CM is following Transition of Care Plan:   Likely home to supportive family with possible HH? 
 
[phone]: CM noted CM management order for Heart Failure; patient is a heart failure patient; due to covid pandemic and restrictions the Hospital to home program is currently suspended CM following.  
Dian Mendez, RN, CRM

## 2020-08-13 NOTE — CARDIO/PULMONARY
Cardiac Rehab: Consult noted and chart reviewed. Formal cardiology note and echo pending. Will follow for echo results. However, due to metastatic breast cancer and known severe diffuse CAD, patient is not a good candidate for cardiac rehab.  Velia Bradshaw RN

## 2020-08-13 NOTE — PROGRESS NOTES
Discussed recent event s- heparin was on from 830 pm to 1030 pmBedside shift change report given to NEVIN Morales (oncoming nurse) by Erica Aguiar (offgoing nurse). Report included the following information SBAR, Kardex and Recent Results.

## 2020-08-13 NOTE — PROGRESS NOTES
Problem: Self Care Deficits Care Plan (Adult) Goal: *Acute Goals and Plan of Care (Insert Text) Description:  
FUNCTIONAL STATUS PRIOR TO ADMISSION: Patient was modified independent using a walker for functional mobility. HOME SUPPORT: The patient lived with son, also receives assistance from caregivers (one for several hours in AM, one for several hours in PM). Per patient, they provide assistance with bathing (shower transfers, cleaning/rinsing), dressing, and toileting (occasionally hygiene and clothing management). Increased assistance needed recently. Occupational Therapy Goals Initiated 8/13/2020 1. Patient will perform standing grooming with supervision/set-up using RW within 7 day(s). 2.  Patient will perform toilet transfer with CGA using RW within 7 day(s). 3.  Patient will perform toileting with CGA using RW prn within 7 day(s). 4.  Patient will tolerate 10 minutes standing activity with supervision/set-up using RW prn within 7 day(s). Outcome: Progressing Towards Goal 
  
OCCUPATIONAL THERAPY EVALUATION Patient: Cass Mckee (82 y.o. female) Date: 8/13/2020 Primary Diagnosis: CH[facility]congestive heart failure) [facility] [I50.9] Precautions:    
 
ASSESSMENT Based on the objective data described below, the patient presents with generalized weakness, decreased activity tolerance, impaired functional mobility and balance, and decreased functional reach, thus limiting patient's independence in self-care. Per patient, lives with son and receives help from caregivers for several hours a day with ADLs and IADLs - extent of assist necessary vs provided for ADLs is unclear. This session, patient able to complete sit>stand with minimal assistance, up to mod assist to stand from low toilet. Patient requiring max to total assist for lower body tasks.  Of note, patient received on 1L O2 - removed for toileting in bathroom with patient SpO2 consistently 94% on room air as checked periodically throughout session. At end of session, RN notified that patient sitting up in bedside chair with son in room. Will continue to see patient in acute setting to facilitate greater safety and independence upon return home and decrease caregiver burden on son during those times paid caregivers are not present. Anticipate patient will be safe to return home with continued support of son and caregivers - may need 24/7 external support pending level of assist available from son. Current Level of Function Impacting Discharge (ADLs/self-care): set-up to total assist 
 
Functional Outcome Measure: The patient scored 35/100 on the Barthel Index. Other factors to consider for discharge: lives with son; son is extremely involved in care - hypervigilant throughout session Patient will benefit from skilled therapy intervention to address the above noted impairments. PLAN : 
Recommendations and Planned Interventions: self care training, functional mobility training, therapeutic exercise, balance training, therapeutic activities, endurance activities, patient education, and home safety training Frequency/Duration: Patient will be followed by occupational therapy 4 times a week to address goals. Recommendation for discharge: (in order for the patient to meet his/her long term goals) Occupational therapy at least 2 days/week in the home AND ensure assist and/or supervision for safety with functional mobility and IADLs This discharge recommendation: 
Has not yet been discussed the attending provider and/or case management IF patient discharges home will need the following DME: patient owns DME required for discharge SUBJECTIVE:  
Patient stated i've gotten more lazy as I've gotten older.  OBJECTIVE DATA SUMMARY:  
HISTORY:  
Past Medical History:  
Diagnosis Date Acute on chronic systolic HF (heart failure) ([facility] 5/19/2018 Age-related osteoporosis without current pathological fracture 9/2/2009 Anemia 9/2/2009 Asymptomatic hyperuricemia 2/16/2017 [facility] CAD (coronary artery disease) 6/21/2018 Carotid bruit 8/31/2011 CHF (congestive heart failure) ([facility] CKD (chronic kidney disease) stage 3, GFR 30-59 ml/min ([facility]) 10/25/2017 Colon cancer ([facility] 9/2/2009  
 surgery/chemo [facility] 9/2/2009 COVID-19 DM (diabetes mellitus) ([facility] 9/2/2009 GERD (gastroesophageal reflux disease) H/O: CVA 9/2/2009  
 slight l sided weakness Heart attack ([address] HTN, goal below 140/90 9/2/2009 Hypothyroid 9/2/2009 Ill-defined condition   
 seasonal allergies Long-term use of immunosuppressant medication 11/21/2016 Metastatic breast cancer ([facility] 6/1/2018 Microalbuminuria 9/2/2009 Osteoporosis 9/2/2009 Other and unspecified hyperlipidemia 1/27/2010 PAD (peripheral artery disease) ([facility] 9/7/2011 Primary osteoarthritis of both knees 9/28/2016 Rheumatoid arthritis involving ankle ([address] 9/28/2016 Rheumatoid arthritis(714.0) 9/2/2009 Seropositive rheumatoid arthritis of multiple sites ([facility] 9/28/2016 Statin intolerance 12/21/2016 Type 2 diabetes mellitus with neurologic complication, with long-term current use of insulin ([address] 9/2/2009 Type 2 diabetes with nephropathy ([facility] 8/20/2018 Unspecified essential hypertension 9/2/2009 Vitamin D deficiency 6/16/2017 Weakness due to cerebrovascular accident Past Surgical History:  
Procedure Laterality Date HX COLECTOMY HX HYSTERECTOMY HX OTHER SURGICAL    
 colonoscopies numerous since 1993 Expanded or extensive additional review of patient history:  
 
Home Situation Home Environment: Private residence One/Two Story Residence: Two story Living Alone: Yes Support Systems: Child(madi) Patient Expects to be Discharged to[de-identified] Private residence Current DME Used/Available at Home: [employer] Willson Hand dominance: Right EXAMINATION OF PERFORMANCE DEFICITS: 
Cognitive/Behavioral Status: 
Neurologic State: Alert; Appropriate for age Orientation Level: Oriented X4 Cognition: Follows commands; Appropriate decision making; Appropriate for age attention/concentration Perception: Appears intact Perseveration: No perseveration noted Safety/Judgement: Insight into deficits;Home safety; Fall prevention Hearing: Auditory Auditory Impairment: Hearing aid(s) Hearing Aids/Status: With patient Vision/Perceptual:   
Acuity: Within Defined Limits;Able to read clock/calendar on wall without difficulty Range of Motion: 
AROM: Generally decreased, functional 
 
Strength: 
Strength: Generally decreased, functional 
 
Coordination: 
Coordination: Within functional limits Fine Motor Skills-Upper: Left Intact; Right Intact Gross Motor Skills-Upper: Left Intact; Right Intact Balance: 
Sitting: Intact Standing: Impaired; With support Standing - Static: Good;Constant support Standing - Dynamic : Fair;Constant support Functional Mobility and Transfers for ADLs: 
Bed Mobility: 
Supine to Sit: Minimum assistance; Additional time;Bed Modified Scooting: Minimum assistance Transfers: 
Sit to Stand: Minimum assistance Stand to Sit: Contact guard assistance Bed to Chair: Contact guard assistance Bathroom Mobility: Contact guard assistance Toilet Transfer : Moderate assistance ADL Assessment: 
Feeding: Setup Oral Facial Hygiene/Grooming: Contact guard assistance Upper Body Dressing: Minimum assistance Lower Body Dressing: Total assistance Toileting: Maximum assistance ADL Intervention and task modifications: 
Feeding Feeding Assistance: Set-up Drink to Mouth: Set-up Lower Body Dressing Assistance Dressing Assistance: Total assistance(dependent) Protective Undergarmet: Total assistance (dependent) Socks: Total assistance (dependent) Leg Crossed Method Used: No 
Position Performed: Seated in chair;Standing Cues: Verbal cues provided;Physical assistance;Don;Doff Adaptive Equipment Used: Noralyn Harish Toileting Toileting Assistance: Maximum assistance Bladder Hygiene: Contact guard assistance Clothing Management: Maximum assistance Cues: Visual cues provided;Verbal cues provided; Tactile cues provided;Physical assistance for pants up;Physical assistance for pants down Adaptive Equipment: Grab bars; Noralyn Harish Cognitive Retraining Safety/Judgement: Insight into deficits;Home safety; Fall prevention Functional Measure: 
Barthel Index: 
 
Bathin Bladder: 5 Bowels: 5 Groomin Dressin Feedin Mobility: 0 Stairs: 0 Toilet Use: 5 Transfer (Bed to Chair and Back): 10 Total: 35/100 The Barthel ADL Index: Guidelines 1. The index should be used as a record of what a patient does, not as a record of what a patient could do. 2. The main aim is to establish degree of independence from any help, physical or verbal, however minor and for whatever reason. 3. The need for supervision renders the patient not independent. 4. A patient's performance should be established using the best available evidence. Asking the patient, friends/relatives and nurses are the usual sources, but direct observation and common sense are also important. However direct testing is not needed. 5. Usually the patient's performance over the preceding 24-48 hours is important, but occasionally longer periods will be relevant. 6. Middle categories imply that the patient supplies over 50 per cent of the effort. 7. Use of aids to be independent is allowed. Michael Ocampoel., Barthel, D.W. (2953). Functional evaluation: the Barthel Index. [address] [facility] (14)2. [address], Errol Schneider, Jeannie Chun, Precious, [address]  Measuring the change indisability after inpatient rehabilitation; comparison of the responsiveness of the Barthel Index and Functional [place] Measure. Journal of Neurology, Neurosurgery, and Psychiatry, 66(4), 867-421. MYRA Rockwell, ANDREA Ramos, & Hira Hope M.A. (2004.) Assessment of post-stroke quality of life in cost-effectiveness studies: The usefulness of the Barthel Index and the EuroQoL-5D. [facility], 13, 120-81 Occupational Therapy Evaluation Charge Determination History Examination Decision-Making LOW Complexity : Brief history review  MEDIUM Complexity : 3-5 performance deficits relating to physical, cognitive , or psychosocial skils that result in activity limitations and / or participation restrictions MEDIUM Complexity : Patient may present with comorbidities that affect occupational performnce. Miniml to moderate modification of tasks or assistance (eg, physical or verbal ) with assesment(s) is necessary to enable patient to complete evaluation Based on the above components, the patient evaluation is determined to be of the following complexity level: LOW Pain Rating: No reports. Activity Tolerance:  
Fair, requires rest breaks, and observed SOB with activity Please refer to the flowsheet for vital signs taken during this treatment. After treatment patient left in no apparent distress:   
Sitting in chair, Call bell within reach, and Caregiver / family present COMMUNICATION/EDUCATION:  
The patients plan of care was discussed with: Registered nurse. Home safety education was provided and the patient/caregiver indicated understanding., Patient/family have participated as able in goal setting and plan of care. , and Patient/family agree to work toward stated goals and plan of care. Thank you for this referral. 
Harry Raphael, OT Time Calculation: 38 mins

## 2020-08-13 NOTE — PROGRESS NOTES
81 yo with stage 4 Breast cancer now being treated at [address] [place] [address]. Asked to evaluate bleeding breast wound. Past Medical History:  
Diagnosis Date  Acute on chronic systolic HF (heart failure) ([address] 5/19/2018  Age-related osteoporosis without current pathological fracture 9/2/2009  Anemia 9/2/2009  Asymptomatic hyperuricemia 2/16/2017  Breast cancer ([address]  CAD (coronary artery disease) 6/21/2018  Carotid bruit 8/31/2011  CHF (congestive heart failure) ([address]  CKD (chronic kidney disease) stage 3, GFR 30-59 ml/min ([facility]) 10/25/2017  Colon cancer ([address] 9/2/2009  
 surgery/chemo  Colon cancer ([address] 9/2/2009  COVID-19   
 DM (diabetes mellitus) ([address] 9/2/2009  GERD (gastroesophageal reflux disease)  H/O: CVA 9/2/2009  
 slight l sided weakness  Heart attack ([address]  HTN, goal below 140/90 9/2/2009  Hypothyroid 9/2/2009  Ill-defined condition   
 seasonal allergies  Long-term use of immunosuppressant medication 11/21/2016  Metastatic breast cancer ([address] 6/1/2018  Microalbuminuria 9/2/2009  Osteoporosis 9/2/2009  Other and unspecified hyperlipidemia 1/27/2010  PAD (peripheral artery disease) ([address] 9/7/2011  Primary osteoarthritis of both knees 9/28/2016  Rheumatoid arthritis involving ankle ([address] 9/28/2016  Rheumatoid arthritis(714.0) 9/2/2009  Seropositive rheumatoid arthritis of multiple sites ([address] 9/28/2016  Statin intolerance 12/21/2016  Type 2 diabetes mellitus with neurologic complication, with long-term current use of insulin ([address] 9/2/2009  Type 2 diabetes with nephropathy ([address] 8/20/2018  Unspecified essential hypertension 9/2/2009  Vitamin D deficiency 6/16/2017  Weakness due to cerebrovascular accident Past Surgical History:  
Procedure Laterality Date  HX COLECTOMY  HX HYSTERECTOMY  HX OTHER SURGICAL    
 colonoscopies numerous since 1993 Social History Socioeconomic History  Marital status:  Spouse name: Not on file  Number of children: Not on file  Years of education: Not on file  Highest education level: Not on file Occupational History  Not on file Social Needs  Financial resource strain: Not on file  Food insecurity Worry: Not on file Inability: Not on file  Transportation needs Medical: Not on file Non-medical: Not on file Tobacco Use  Smoking status: Never Smoker  Smokeless tobacco: Never Used Substance and Sexual Activity  Alcohol use: No  
 Drug use: No  
 Sexual activity: Not Currently Partners: Male Lifestyle  Physical activity Days per week: Not on file Minutes per session: Not on file  Stress: Not on file Relationships  Social connections Talks on phone: Not on file Gets together: Not on file Attends Confucianism service: Not on file Active member of club or organization: Not on file Attends meetings of clubs or organizations: Not on file Relationship status: Not on file  Intimate partner violence Fear of current or ex partner: Not on file Emotionally abused: Not on file Physically abused: Not on file Forced sexual activity: Not on file Other Topics Concern  Not on file Social History Narrative  Not on file No current facility-administered medications on file prior to encounter. Current Outpatient Medications on File Prior to Encounter Medication Sig Dispense Refill  furosemide (Lasix) 40 mg tablet Take 40-60 mg by mouth every other day. Usual dose 40 mg daily. Has been alternating between 40 and 60 mg daily for ~2 weeks. Took 60 mg this morning (8/12/20).  carvediloL (Coreg) 6.25 mg tablet Take 6.25 mg by mouth two (2) times daily (with meals).  clopidogreL (PLAVIX) 75 mg tab TAKE ONE TABLET BY MOUTH DAILY 30 Tab 5  cholecalciferol (Vitamin D3) 25 mcg (1,000 unit) cap Take 1,000 Units by mouth daily.  glucosam/wesley-msm1/C/bassam/bosw (OSTEO BI-FLEX TRIPLE STRENGTH PO) Take 2 Tabs by mouth daily.  evolocumab (Repatha SureClick) pen injection 798 mg by SubCUTAneous route every fourteen (14) days.  multivitamin (ONE A DAY) tablet Take 1 Tab by mouth daily.  insulin degludec (TRESIBA FLEXTOUCH U-100) 100 unit/mL (3 mL) inpn 14 Units by SubCUTAneous route daily.  levothyroxine (SYNTHROID) 88 mcg tablet Take 88 mcg by mouth Daily (before breakfast).  aspirin delayed-release 81 mg tablet Take 81 mg by mouth daily.  OMEGA-3 FATTY ACIDS/FISH OIL (OMEGA 3 FISH OIL PO) Take  by mouth daily.  insulin aspart U-100 (NOVOLOG FLEXPEN U-100 INSULIN) 100 unit/mL (3 mL) inpn by SubCUTAneous route Before breakfast, lunch, dinner and at bedtime. 2-3 units depending on blood sugar before meals. (Note: son states very sensitive.)  nitroglycerin (NITROSTAT) 0.4 mg SL tablet 1 Tab by SubLINGual route as needed for Chest Pain. Up to 3 doses. (Patient taking differently: 1 Tab by SubLINGual route every five (5) minutes as needed for Chest Pain. Up to 3 doses. ) 40 Tab 0 Allergies Allergen Reactions  Statins-Hmg-Coa Reductase Inhibitors Other (comments) Intolerant to statins  Sulfa (Sulfonamide Antibiotics) Other (comments)  
  syncope OB History No obstetric history on file. Physical Exam 
 
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 Has large breast mass with small ulceration. Not sure how quickly this is progressing. She is a very poor surgical candidate. Will discuss with Dr. Apollo Rodriguez the possibility of medical therapy. Will manage breast with dressing changes for now. Thanks.

## 2020-08-14 NOTE — CARDIO/PULMONARY
Cardiac Rehab: Consult received and chart reviewed. Patient is not a candidate for cardiac rehab, at this time. Patient does not meet the following criteria for enrollment in cardiac rehab. 
? EF ? 35% at time of enrollment ? Stable class 2-4 NYHA heart failure ? Optimal medical therapy for > 6 weeks ? No major hospitalizations within the last 6 weeks ? No major hospitalizations within the next 6 weeks Tracy Sweet, RN

## 2020-08-14 NOTE — PROGRESS NOTES
[address] [facility] Adult  Hospitalist Group Hospitalist Progress Note 5[address] [facility], DO Answering service: [phone] OR [phone] from in house phone Date of Service:  2020 NAME:  Zena Chaney :  1935 MRN:  [id number] Admission Summary: Zena Chaney is a 82 y.o. female who presents for evaluation of shortness of breath. Patient has history of congestive heart failure, last echocardiogram in 2020 with EF of 40 to 45%. She is currently on Lasix 40 mg daily which she states has been compliant with. She denies intake of excessive salt. Patient was doing fine yesterday and went to bed. This morning when she woke up, patient had acute onset shortness of breath and certainly presented to the emergency room. Patient was subsequently put on BiPAP. Further work-up reveals a patient with mild elevation of troponin as well as BNP and chest x-ray reveals pulmonary edema. Patient currently denies chest pain at this time. Hospitalist service requested to admit the patient for further evaluation and management. Interval history / Subjective: Follow up shortness of breath. Patient seen and examined. Comfortable on room air. Has persistent crackles, requiring IV diuresis. Echo pending. Assessment & Plan:  
 
Acute on chronic systolic Congestive heart failure 
-Acute on chronic congestive heart failure, last known EF of 40 to 45% 
-Continue to diurese with Lasix, daily weights, monitor I&Os -cardiology following 
-repeat echo pending Acute hypoxic respiratory failure: resolved 
-required bipap on admission, now on room air 
-Continue Lasix IV twice daily 
  
Elevated troponin suspected secondary to NSTEMI  
-History of coronary artery disease 
-no intervention per cardiology 
-Continue Plavix and beta-blocker and medically treat her Hypertension 
-Continue Coreg and Lasix -Blood pressure stable 
  
Diabetes -Insulin sliding scale coverage 8.8 hemoglobin A1c 
  
Hypothyroidism 
-Continue Synthroid 
  
Metastatic breast cancer with bleeding left breast mass. Breast surgeon saw the patient Local wound care For the management per [facility] oncologist Dr. Joya Dai Code status: full DVT prophylaxis: scd Care Plan discussed with: Patient/Family Anticipated Disposition: Home w/Family Anticipated Discharge: 24-48 hours Hospital Problems  Date Reviewed: 7/23/2020 Codes Class Noted POA  
 CHF (congestive heart failure) ([facility] ICD-10-CM: I50.9 ICD-9-CM: 428.0  8/12/2020 Unknown Review of Systems: A comprehensive review of systems was negative except for that written in the HPI. Vital Signs:  
 Last 24hrs VS reviewed since prior progress note. Most recent are: 
Visit Vitals /71 (BP 1 Location: Left arm) Pulse 95 Temp 98.4 °F (36.9 °C) Resp 22 Ht 5' 1\" (1.549 m) Wt 60.2 kg (132 lb 11.5 oz) SpO2 97% BMI 25.08 kg/m² Intake/Output Summary (Last 24 hours) at 8/14/2020 1112 Last data filed at 8/14/2020 0471 Gross per 24 hour Intake 390 ml Output 950 ml Net -560 ml Physical Examination:  
 
 
     
Constitutional:  no distress, comfortable ENT:  Oral mucosa moist, oropharynx benign. Resp:  left basilar crackles, No wheezing/rhonchi/rales. No accessory muscle use CV:  regular rate and rhythm, no murmurs GI:  Soft, non distended, non tender. normoactive bowel sounds, no hepatosplenomegaly Musculoskeletal:  No edema, warm, 2+ pulses throughout Neurologic:  Moves all extremities. AAOx3 Data Review:  
 Review and/or order of clinical lab test 
 
 
Labs:  
 
Recent Labs 08/14/20 0449 08/12/20 
1917 WBC 4.1 5.7 HGB 7.5* 8.3* HCT 24.8* 28.0*  
 181 Recent Labs 08/14/20 
0449 08/13/20 
0434 08/12/20 
1201  138 134* K 3.5 3.6 5.2*  
 104 104 CO2 25 26 21 BUN 42* 38* 38* CREA 1.26* 1.23* 1.38* * 165* 339* CA 8.1* 8.4* 9.3 Recent Labs 08/12/20 
1201 08/11/20 
1927 ALT 34 29 * 350* TBILI 0.6 0.4 TP 8.2 7.5 ALB 2.7* 2.7*  
GLOB 5.5* 4.8* Recent Labs 08/13/20 
0434 08/12/20 1917 APTT 32.5* 31.7 No results for input(s): FE, TIBC, PSAT, FERR in the last 72 hours. No results found for: FOL, RBCF No results for input(s): PH, PCO2, PO2 in the last 72 hours. Recent Labs 08/12/20 1917 08/12/20 
1625 08/12/20 1201 TROIQ 4.35* 2.44* 0.16* Lab Results Component Value Date/Time Cholesterol, total 74 04/16/2018 04:21 AM  
 HDL Cholesterol 25 04/16/2018 04:21 AM  
 LDL, calculated 31.6 04/16/2018 04:21 AM  
 Triglyceride 87 04/16/2018 04:21 AM  
 CHOL/HDL Ratio 3.0 04/16/2018 04:21 AM  
 
Lab Results Component Value Date/Time Glucose (POC) 180 (H) 08/14/2020 07:53 AM  
 Glucose (POC) 311 (H) 08/13/2020 09:31 PM  
 Glucose (POC) 263 (H) 08/13/2020 04:31 PM  
 Glucose (POC) 253 (H) 08/13/2020 10:54 AM  
 Glucose (POC) 149 (H) 08/13/2020 07:48 AM  
 
Lab Results Component Value Date/Time Color YELLOW/STRAW 07/16/2020 03:02 PM  
 Appearance CLEAR 07/16/2020 03:02 PM  
 Specific gravity 1.007 07/16/2020 03:02 PM  
 pH (UA) 6.5 07/16/2020 03:02 PM  
 Protein TRACE (A) 07/16/2020 03:02 PM  
 Glucose Negative 07/16/2020 03:02 PM  
 Ketone Negative 07/16/2020 03:02 PM  
 Bilirubin Negative 07/16/2020 03:02 PM  
 Urobilinogen 0.2 07/16/2020 03:02 PM  
 Nitrites Negative 07/16/2020 03:02 PM  
 Leukocyte Esterase MODERATE (A) 07/16/2020 03:02 PM  
 Epithelial cells FEW 07/16/2020 03:02 PM  
 Bacteria Negative 07/16/2020 03:02 PM  
 WBC 20-50 07/16/2020 03:02 PM  
 RBC 0-5 07/16/2020 03:02 PM  
 
 
 
Medications Reviewed:  
 
Current Facility-Administered Medications Medication Dose Route Frequency  acetaminophen (TYLENOL) tablet 650 mg  650 mg Oral Q6H PRN  
  aspirin delayed-release tablet 81 mg  81 mg Oral DAILY  carvediloL (COREG) tablet 6.25 mg  6.25 mg Oral BID  cholecalciferol (VITAMIN D3) (1000 Units /25 mcg) tablet 1 Tab  1,000 Units Oral DAILY  clopidogreL (PLAVIX) tablet 75 mg  75 mg Oral DAILY  levothyroxine (SYNTHROID) tablet 88 mcg  88 mcg Oral ACB  therapeutic multivitamin (THERAGRAN) tablet 1 Tab  1 Tab Oral DAILY  nitroglycerin (NITROSTAT) tablet 0.4 mg  0.4 mg SubLINGual PRN  
 furosemide (LASIX) injection 40 mg  40 mg IntraVENous Q12H  
 insulin lispro (HUMALOG) injection   SubCUTAneous AC&HS  
 glucose chewable tablet 16 g  4 Tab Oral PRN  
 glucagon (GLUCAGEN) injection 1 mg  1 mg IntraMUSCular PRN  
 dextrose 10% infusion 0-250 mL  0-250 mL IntraVENous PRN  
 
______________________________________________________________________ EXPECTED LENGTH OF STAY: - - - 
ACTUAL LENGTH OF STAY:          2 [address] [facility],

## 2020-08-14 NOTE — PROGRESS NOTES
Transition of Care RUR: 39% Patients disposition is to go home with home health and son to transport patient home at discharge Patient likely to discharge over the weekend 
 
-order noted for [facility] PT/OT; CM met with patient and sonKathe, today to discuss [facility]; freedom of choice given for [facility] agencies; patient has used [facility] in the past and would like to use them again 
-referral sent to [facility]; awaiting response 
-please call weekend CM staff ext [phone] to insure home care has been secured 
-son to drive patient home at discharge The Plan for Transition of Care is related to the following treatment goals: Home Health The Patient and/or patient representative sonKathe was provided with a choice of provider and agrees  
with the discharge plan. [x] Yes [] No 
 
Freedom of choice list was provided with basic dialogue that supports the patient's individualized plan of care/goals, treatment preferences and shares the quality data associated with the providers. [x] Yes [] No 
 
Medicare pt has received, reviewed, and signed 2nd IM letter informing them of their right to appeal the discharge. CM following Alba Ramirez RN, CRM

## 2020-08-14 NOTE — PROGRESS NOTES
Orders received, chart reviewed and patient evaluated by physical therapy. Pending progression with skilled acute physical therapy, recommend: 
Physical therapy at least 2 days/week in the home AND ensure assist and/or supervision for safety with functional mobility Recommend with nursing patient to complete as able in order to maintain strength, endurance and independence: OOB to chair 3x/day for meals and ambulating with RW to bathroom. Thank you for your assistance. Full evaluation to follow.   
Dat Baires, PT

## 2020-08-14 NOTE — PROGRESS NOTES
Problem: Mobility Impaired (Adult and Pediatric) Goal: *Acute Goals and Plan of Care (Insert Text) Description: FUNCTIONAL STATUS PRIOR TO ADMISSION: Patient was modified independent using a rolling walker for functional mobility in household; family uses wheelchair outside of home. HOME SUPPORT PRIOR TO ADMISSION: The patient lived with 3 sons - has paid caregiver 7:30am to 8 pm . Physical Therapy Goals Initiated 8/14/2020 1. Patient will move from supine to sit and sit to supine , scoot up and down, and roll side to side in bed with modified independence within 7 day(s). 2.  Patient will transfer from bed to chair and chair to bed with modified independence using the least restrictive device within 7 day(s). 3.  Patient will perform sit to stand with modified independence within 7 day(s). 4.  Patient will ambulate with modified independence for > 100 feet with the least restrictive device within 7 day(s). 5.  Patient will ascend/descend 4 stairs with one handrail(s) with minimal assistance/contact guard assist within 7 day(s). PHYSICAL THERAPY EVALUATION Patient: Abbe Willis (82 y.o. female) Date: 8/14/2020 Primary Diagnosis: CHF (congestive heart failure) ([facility] [address] [I50.9] Precautions:   Fall ASSESSMENT Based on the objective data described below, the patient presents generalized weakness, decreased activity tolerance, and overall decline in functional mobility associated with acute/chronic CHF. Patient tolerated activity on room air - oxgyen sats 99% ,  after short distance ambulation. Pt close to baseline - recommend home with continued caregiver assist and home PT. Current Level of Function Impacting Discharge (mobility/balance): CGA to min assist for bed mobility/transfers and amb with RW 
 
Functional Outcome Measure: The patient scored 35/100 on the Barthel Index outcome measure which is indicative of 65% disability for ADL's. Other factors to consider for discharge:  
  
Patient will benefit from skilled therapy intervention to address the above noted impairments. PLAN : 
Recommendations and Planned Interventions: bed mobility training, transfer training, gait training, therapeutic exercises, patient and family training/education, and therapeutic activities Frequency/Duration: Patient will be followed by physical therapy:  5 times a week to address goals. Recommendation for discharge: (in order for the patient to meet his/her long term goals) Physical therapy at least 2 days/week in the home AND ensure assist and/or supervision for safety with functional mobility This discharge recommendation: 
Has been made in collaboration with the attending provider and/or case management IF patient discharges home will need the following DME: patient owns DME required for discharge SUBJECTIVE:  
Patient stated do I have to walk now.  OBJECTIVE DATA SUMMARY:  
HISTORY:   
Past Medical History:  
Diagnosis Date Acute on chronic systolic HF [facility] 5/19/2018 Age-related osteoporosis without current pathological fracture 9/2/2009 Anemia 9/2/2009 Asymptomatic hyperuricemia 2/16/2017 Breast cancer ([facility] CAD (coronary artery disease) 6/21/2018 Carotid bruit 8/31/2011 CHF (congestive heart failure) ([facility] CKD (chronic kidney disease) stage 3, GFR 30-59 ml/min ([facility] 10/25/2017 Colon cancer ([facility] 9/2/2009  
 surgery/chemo Colon cancer ([facility] 9/2/2009 COVID-19 DM (diabetes mellitus) ([facility] 9/2/2009 GERD (gastroesophageal reflux disease) H/O: CVA 9/2/2009  
 slight l sided weakness Heart attack ([address] HTN, goal below 140/90 9/2/2009 Hypothyroid 9/2/2009 Ill-defined condition   
 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  
Procedure Laterality Date HX COLECTOMY HX HYSTERECTOMY HX OTHER SURGICAL    
 colonoscopies numerous since 1993 Home Situation Home Environment: Private residence # Steps to Enter: 4 Rails to Enter: Yes One/Two Story Residence: Two story, live on 1st floor Living Alone: No 
Support Systems: Child(madi) Patient Expects to be Discharged to[de-identified] Private residence Current DME Used/Available at Home: Walker, rolling, Wheelchair, CPAP EXAMINATION/PRESENTATION/DECISION MAKING:  
Critical Behavior: 
Neurologic State: Alert Orientation Level: Oriented X4 Cognition: Appropriate safety awareness, Memory loss, Follows commands Safety/Judgement: Good awareness of safety precautions, Insight into deficits Hearing: Auditory Auditory Impairment: Hearing aid(s) Hearing Aids/Status: With patient Range Of Motion: 
AROM: Generally decreased, functional 
  
  
  
  
  
  
  
Strength:   
Strength: Generally decreased, functional 
  
  
  
  
  
  
Tone & Sensation:  
  
 WNL's 
  
  
  
  
  
  
  
   
Coordination: 
Coordination: Within functional limits Functional Mobility: 
Bed Mobility: 
Rolling: Stand-by assistance Supine to Sit: Stand-by assistance Transfers: 
Sit to Stand: Minimum assistance Stand to Sit: Contact guard assistance Bed to Chair: Minimum assistance;Assist x1 Balance:  
Sitting: Impaired; With support Sitting - Static: Good (unsupported) Sitting - Dynamic: Fair (occasional) Standing: Impaired; With support Standing - Static: Good;Constant support(RW) 
Standing - Dynamic : Fair;Constant support(RW) 
Ambulation/Gait Training: 
Distance (ft): 50 Feet (ft) Ambulation - Level of Assistance: Contact guard assistance; Additional time Gait Abnormalities: Decreased step clearance Speed/Haylee: Slow Step Length: Right shortened;Left shortened Functional Measure: 
Barthel Index: 
 
Bathin Bladder: 5 Bowels: 5 Groomin Dressin Feedin Mobility: 0 Stairs: 0 Toilet Use: 5 Transfer (Bed to Chair and Back): 10 Total: 35/100 The Barthel ADL Index: Guidelines 1. The index should be used as a record of what a patient does, not as a record of what a patient could do. 2. The main aim is to establish degree of independence from any help, physical or verbal, however minor and for whatever reason. 3. The need for supervision renders the patient not independent. 4. A patient's performance should be established using the best available evidence. Asking the patient, friends/relatives and nurses are the usual sources, but direct observation and common sense are also important. However direct testing is not needed. 5. Usually the patient's performance over the preceding 24-48 hours is important, but occasionally longer periods will be relevant. 6. Middle categories imply that the patient supplies over 50 per cent of the effort. 7. Use of aids to be independent is allowed. Ariadne Damico., Barthel, D.W. ([id number]). Functional evaluation: the Barthel Index. [address] [facility] (14)2. TIAGO Wesley, Leoncio Velazquez., Ellie Chan., Precious, [address]  Measuring the change indisability after inpatient rehabilitation; comparison of the responsiveness of the Barthel Index and Functional Roland Measure. Journal of Neurology, Neurosurgery, and Psychiatry, 664), 782-797. BENJI Padilla.ELROY, ANDREA Ramos, & Romelia Zuleta M.A. (2004.) Assessment of post-stroke quality of life in cost-effectiveness studies: The usefulness of the Barthel Index and the EuroQoL-5D. [facility], 13, [phone] Physical Therapy Evaluation Charge Determination History Examination Presentation Decision-Making LOW Complexity : Zero comorbidities / personal factors that will impact the outcome / POC LOW Complexity : 1-2 Standardized tests and measures addressing body structure, function, activity limitation and / or participation in recreation  LOW Complexity : Stable, uncomplicated  LOW Complexity : FOTO score of  Based on the above components, the patient evaluation is determined to be of the following complexity level: LOW Pain Rating: 
Pain left knee 7/10 Activity Tolerance:  
Fair and requires rest breaks Please refer to the flowsheet for vital signs taken during this treatment. After treatment patient left in no apparent distress:  
Sitting in chair, Call bell within reach, and Caregiver / family present COMMUNICATION/EDUCATION:  
The patients plan of care was discussed with: Registered nurse. Fall prevention education was provided and the patient/caregiver indicated understanding., Patient/family have participated as able in goal setting and plan of care. , and Patient/family agree to work toward stated goals and plan of care. Thank you for this referral. 
Elayne Irene, PT Time Calculation: 25 mins

## 2020-08-14 NOTE — PROGRESS NOTES
Bedside and Verbal shift change report given to ARIANA Almanzar (oncoming nurse) by Lana Jean (offgoing nurse). Report included the following information SBAR, Kardex, ED Summary, Procedure Summary, Intake/Output, MAR, Accordion and Cardiac Rhythm NSR.

## 2020-08-14 NOTE — PROGRESS NOTES
Bedside shift change report given to Piotr Honeycutt RN (oncoming nurse) by Stella Gomez RN (offgoing nurse). Report included the following information SBAR, Kardex, MAR, Recent Results and Cardiac Rhythm NSR.

## 2020-08-15 NOTE — PROGRESS NOTES
Problem: Diabetes Self-Management Goal: *Disease process and treatment process Description: Define diabetes and identify own type of diabetes; list 3 options for treating diabetes. Outcome: Progressing Towards Goal 
Goal: *Incorporating nutritional management into lifestyle Description: Describe effect of type, amount and timing of food on blood glucose; list 3 methods for planning meals. Outcome: Progressing Towards Goal 
Goal: *Incorporating physical activity into lifestyle Description: State effect of exercise on blood glucose levels. Outcome: Progressing Towards Goal 
Goal: *Developing strategies to promote health/change behavior Description: Define the ABC's of diabetes; identify appropriate screenings, schedule and personal plan for screenings. Outcome: Progressing Towards Goal 
Goal: *Using medications safely Description: State effect of diabetes medications on diabetes; name diabetes medication taking, action and side effects. Outcome: Progressing Towards Goal 
Goal: *Monitoring blood glucose, interpreting and using results Description: Identify recommended blood glucose targets  and personal targets. Outcome: Progressing Towards Goal 
Goal: *Prevention, detection, treatment of acute complications Description: List symptoms of hyper- and hypoglycemia; describe how to treat low blood sugar and actions for lowering  high blood glucose level. Outcome: Progressing Towards Goal 
Goal: *Prevention, detection and treatment of chronic complications Description: Define the natural course of diabetes and describe the relationship of blood glucose levels to long term complications of diabetes. Outcome: Progressing Towards Goal 
Goal: *Developing strategies to address psychosocial issues Description: Describe feelings about living with diabetes; identify support needed and support network Outcome: Progressing Towards Goal 
Goal: *Insulin pump training Outcome: Progressing Towards Goal 
 Goal: *Sick day guidelines Outcome: Progressing Towards Goal 
Goal: *Patient Specific Goal (EDIT GOAL, INSERT TEXT) Outcome: Progressing Towards Goal 
  
Problem: Patient Education: Go to Patient Education Activity Goal: Patient/Family Education Outcome: Progressing Towards Goal 
  
Problem: Pressure Injury - Risk of 
Goal: *Prevention of pressure injury Description: Document Calderon Scale and appropriate interventions in the flowsheet. Outcome: Progressing Towards Goal 
Note: Pressure Injury Interventions: 
Sensory Interventions: Assess changes in LOC, Keep linens dry and wrinkle-free, Maintain/enhance activity level, Minimize linen layers Moisture Interventions: Absorbent underpads, Check for incontinence Q2 hours and as needed, Internal/External urinary devices Activity Interventions: Assess need for specialty bed, Increase time out of bed Mobility Interventions: Assess need for specialty bed, HOB 30 degrees or less, Pressure redistribution bed/mattress (bed type) Nutrition Interventions: Document food/fluid/supplement intake, Offer support with meals,snacks and hydration Friction and Shear Interventions: Apply protective barrier, creams and emollients, HOB 30 degrees or less, Minimize layers Problem: Patient Education: Go to Patient Education Activity Goal: Patient/Family Education Outcome: Progressing Towards Goal 
  
Problem: Falls - Risk of 
Goal: *Absence of Falls Description: Document [facility] Fall Risk and appropriate interventions in the flowsheet. Outcome: Progressing Towards Goal 
Note: Fall Risk Interventions: 
Mobility Interventions: Communicate number of staff needed for ambulation/transfer, Patient to call before getting OOB, Utilize walker, cane, or other assistive device Mentation Interventions: Adequate sleep, hydration, pain control, Door open when patient unattended, Eyeglasses and hearing aids, Toileting rounds Medication Interventions: Evaluate medications/consider consulting pharmacy, Patient to call before getting OOB, Teach patient to arise slowly Elimination Interventions: Call light in reach, Patient to call for help with toileting needs History of Falls Interventions: Door open when patient unattended Problem: Patient Education: Go to Patient Education Activity Goal: Patient/Family Education Outcome: Progressing Towards Goal 
  
Problem: Discharge Planning Goal: *Discharge to safe environment Outcome: Progressing Towards Goal 
Goal: *Knowledge of medication management Outcome: Progressing Towards Goal 
Goal: *Knowledge of discharge instructions Outcome: Progressing Towards Goal

## 2020-08-15 NOTE — PROGRESS NOTES
Cardiology Progress Note Admit Date: 8/12/2020 Admit Diagnosis: [facility]est[facility] [I50.9] Date: 8/15/2020     Time: 8:54 AM 
 
Subjective: 
Less SOB. Off Oxygen. She looks better. Co arthritis pian this am requests tylenol. Assessment and Plan 1. Acute on chronic systolic HF 
            - EF 45% on echo.   
            - NYHA class IV 
            - IV Lasix - Coreg 6.25 mg BID 
            - No ACE/ARB/ANi with CKD 2. CAD 
            - Trop to 4.35.   
            - No c/o CP. Minor EKG changes - Severe diffuse coronary disease by cath 2018 not felt to be a candidate for CABG or PCI 
            - Continue ASA, Plavix and Coreg 3. BrCa 
            - metastatic breast cancer with active liver lesion - Tumor erosion of left breast, causing bleeding -  Dr. Wanda Espinoza following 
            - Has scheduled appt at [facility] for possible excision 4. CKD 
            - stage III 
            - stable 5. HTN 
            - Coreg, Lasix PTA 6. DM II 
            - per Primary team 
7. Hypothyroidism - Synthroid PTA 8. COVID 19 
            - she was confirmed + for COVID in June of 2020, by swab 
  
Oxygenation improving. Less crackles in bases. She looks better. Po lasix today. D/w her nurse - Standing weight today. Bed weight up, but UOP over 2000. Needs no further cardiac testing at this time. No results found for: JENNIFER Past Medical History:  
Diagnosis Date  Acute on chronic systolic HF (heart failure) ([facility] 5/19/2018  Age-related osteoporosis without current pathological fracture 9/2/2009  Anemia 9/2/2009  Asymptomatic hyperuricemia 2/16/2017  Breast cancer ([facility]  CAD (coronary artery disease) 6/21/2018  Carotid bruit 8/31/2011  CHF (congestive heart failure) ([facility]  CKD (chronic kidney disease) stage 3, GFR 30-59 ml/min ([facility]) 10/25/2017  Colon cancer ([facility] 9/2/2009  
 surgery/chemo  Colon cancer ([facility] 9/2/2009  COVID-19   
 DM (diabetes mellitus) ([facility] 9/2/2009  GERD (gastroesophageal reflux disease)  H/O: CVA 9/2/2009  
 slight l sided weakness  [facility]  HTN, goal below 140/90 9/2/2009  Hypothyroid 9/2/2009  Ill-defined condition   
 seasonal allergies  Long-term use of immunosuppressant medication 11/21/2016  Metastatic breast cancer ([facility] 6/1/2018  Microalbuminuria 9/2/2009  Osteoporosis 9/2/2009  Other and unspecified hyperlipidemia 1/27/2010  PAD (peripheral artery disease) ([facility] 9/7/2011  Primary osteoarthritis of both knees 9/28/2016  Rheumatoid arthritis involving ankle ([facility] 9/28/2016  Rheumatoid arthritis(714.0) 9/2/2009  Seropositive rheumatoid arthritis of multiple sites ([facility] 9/28/2016  Statin intolerance 12/21/2016  Type 2 diabetes mellitus with neurologic complication, with long-term current use of insulin ([facility] 9/2/2009  Type 2 diabetes with nephropathy ([facility] 8/20/2018  Unspecified essential hypertension 9/2/2009  Vitamin D deficiency 6/16/2017  Weakness due to cerebrovascular accident Social History Tobacco Use  Smoking status: Never Smoker  Smokeless tobacco: Never Used Substance Use Topics  Alcohol use: No  
 Drug use: No  
  
  
 
Review of Systems:   
[] Patient unable to provide secondary to condition 
 
[x] All systems negative, except as checked below. Constitutional:   
[]Weight Change  []Fever   []Chills   []Night Sweats  []Fatigue  []Malaise  []____ 
ENT/Mouth:    
[]Hearing Changes  []Ear Pain  []Nasal Congestion   []Sinus Pain  []Hoarseness []Sore throat  []Rhinorrhea  []Swallowing Difficulty  []____ Eyes:   
[]Eye Pain  []Swelling  []Redness  []Foreign Body  []Discharge  []Vision Changes  []____ Cardiovascular: []Chest Pain  [x]SOB  []PND  []REDDY  []Orthopnea  []Claudication  []Edema  
[]Palpitations  []____ Respiratory:   
[]Cough  []Sputum  []Wheezing,  []SOB  []Hemoptysis  []____ Gastrointestinal:   
[]Nausea  []Vomiting  []Diarrhea  []Constipation  []Pain  []Heartburn  []Anorexia []Dysphagia  []Hematochezia  []Melena,  []Jaundice  []____ Genitourinary:   
[]Dysuria  []Urinary Frequency  []Hematuria  []Urinary Incontinence  []Urgency []Flank Pain  []Hesitancy  []____ Musculoskeletal:   
[]Arthralgias  []Myalgias  []Joint Swelling  []Joint Stiffness  []Back Pain  []Neck Pain  []____ Skin:   
[]Skin Lesions  []Pruritis  []Hair Changes  []Skin rashes  []____ Neuro:   
[]Weakness  []Numbness  []Paresthesias  []Loss of Consciousness  []Syncope  
[]Dizziness  []Headache  []Coordination Changes  []Recent Falls  []____ Psych:   
[]Anxiety/Depression  []Insomnia  []Memory Changes  []Violence/Abuse Hx.  []____ Heme/Lymph:   
[]Bruising  []Bleeding  []Lymphadenopathy  []____ Endocrine:   
[]Polyuria  []Polydipsia  []Temperature Intolerance  []____ Objective: 
 
 Physical Exam: 
             
Visit Vitals /60 (BP 1 Location: Left arm, BP Patient Position: At rest) Pulse 100 Temp 98.6 °F (37 °C) Resp 16 Ht 5' 1\" (1.549 m) Wt 132 lb 15 oz (60.3 kg) SpO2 96% BMI 25.12 kg/m² General Appearance:  Frail,alert and oriented x 3, and 
 individual in no acute distress. Ears/Nose/Mouth/Throat:    Hearing grossly normal.  
    Neck:  Supple. Chest:    Lungs slight rales in bases to auscultation bilaterally. Cardiovascular:   Regular rate and rhythm, S1, S2 normal, 2/6 as murmur. Abdomen:    Soft, non-tender, bowel sounds are active. Extremities:  No edema bilaterally. Skin:  Warm and dry. Telemetry: normal sinus rhythm Data Review:  
 Labs:   
Recent Results (from the past 24 hour(s)) GLUCOSE, POC Collection Time: 08/14/20 11:54 AM  
Result Value Ref Range Glucose (POC) 318 (H) 65 - 100 mg/dL Performed by Bonny COLINDRES) ECHO ADULT COMPLETE Collection Time: 08/14/20  1:25 PM  
Result Value Ref Range IVSd 0.73 0.6 - 0.9 cm LVIDd 4.70 3.9 - 5.3 cm LVIDs 3.86 cm  
 LVOT d 1.79 cm  
 LVPWd 0.86 0.6 - 0.9 cm  
 LVOT Peak Gradient 3.85 mmHg LVOT Peak Gradient 13.12 mmHg LVOT SV 98.1 mL  
 LVOT Peak Velocity 181.11 cm/s LVOT Peak Velocity 98.05 cm/s LVOT VTI 38.84 cm RVIDd 3.90 cm RVSP 33.11 mmHg Left Atrium Major Axis 5.03 cm  
 LA Volume 38.93 22 - 52 mL  
 LA Area 4C 16.13 cm2 LA Vol 2C 32.09 22 - 52 mL  
 LA Vol 4C 39.93 22 - 52 mL Est. RA Pressure 3.00 mmHg Aortic Valve Area by Continuity of Peak Velocity 1.85 cm2 Aortic Valve Area by Continuity of Peak Velocity 1.00 cm2 Aortic Valve Area by Continuity of Peak Velocity 1.13 cm2 Aortic Valve Area by Continuity of Peak Velocity 2.09 cm2 Aortic Valve Area by Continuity of VTI 1.92 cm2 AoV PG 19.06 mmHg AoV PG 24.43 mmHg Aortic Valve Systolic Mean Gradient 83.37 mmHg Aortic Valve Systolic Peak Velocity 128.87 cm/s Aortic Valve Systolic Peak Velocity 335.11 cm/s AoV VTI 51.01 cm  
 MV A Grover 98.48 cm/s Mitral Valve E Wave Deceleration Time 0.19 s MV E Grover 110.90 cm/s Mitral Valve Pressure Half-time 0.06 s MVA (PHT) 3.95 cm2 Pulmonic Valve Systolic Peak Instantaneous Gradient 6.34 mmHg Tapse 1.43 (A) 1.5 - 2.0 cm Triscuspid Valve Regurgitation Peak Gradient 30.11 mmHg  
 TR Max Velocity 274.37 cm/s Ao Root D 2.73 cm  
 MV E/A 1.13 LV Mass .3 67 - 162 g  
 LV Mass AL Index 76.6 43 - 95 g/m2 Left Atrium Minor Axis 3.18 cm  
 LA Vol Index 24.59 16 - 28 ml/m2 LA Vol Index 20.27 16 - 28 ml/m2 LA Vol Index 25.22 16 - 28 ml/m2 KANDI/BSA VTI 1.2 cm2/m2 GLUCOSE, POC Collection Time: 08/14/20  4:34 PM  
Result Value Ref Range Glucose (POC) 255 (H) 65 - 100 mg/dL Performed by Evelio Mitchell GLUCOSE, POC Collection Time: 08/14/20  9:46 PM  
Result Value Ref Range Glucose (POC) 291 (H) 65 - 100 mg/dL [facility] PAN[facility], BASIC Collection Time: 08/15/20  4:34 AM  
Result Value Ref Range Sodium 133 (L) 136 - 145 mmol/L Potassium 3.3 (L) 3.5 - 5.1 mmol/L Chloride 100 97 - 108 mmol/L  
 CO2 25 21 - 32 mmol/L Anion gap 8 5 - 15 mmol/L Glucose 170 (H) 65 - 100 mg/dL BUN 40 (H) 6 - 20 MG/DL Creatinine 1.21 (H) 0.55 - 1.02 MG/DL  
 BUN/Creatinine ratio 33 (H) 12 - 20 GFR est AA 52 (L) >60 ml/min/1.73m2 GFR est non-AA 42 (L) >60 ml/min/1.73m2 Calcium 7.9 (L) 8.5 - 10.1 MG/DL MAGNESIUM Collection Time: 08/15/20  4:34 AM  
Result Value Ref Range Magnesium 2.0 1.6 - 2.4 mg/dL PHOSPHORUS Collection Time: 08/15/20  4:34 AM  
Result Value Ref Range Phosphorus 2.6 2.6 - 4.7 MG/DL  
GLUCOSE, POC Collection Time: 08/15/20  7:36 AM  
Result Value Ref Range Glucose (POC) 228 (H) 65 - 100 mg/dL Performed by Dandre Mendez Radiology:  
 
  
Current Facility-Administered Medications Medication Dose Route Frequency  lisinopriL (PRINIVIL, ZESTRIL) tablet 5 mg  5 mg Oral DAILY  furosemide (LASIX) tablet 40 mg  40 mg Oral BID  acetaminophen (TYLENOL) tablet 650 mg  650 mg Oral Q6H PRN  
 aspirin delayed-release tablet 81 mg  81 mg Oral DAILY  carvediloL (COREG) tablet 6.25 mg  6.25 mg Oral BID  cholecalciferol (VITAMIN D3) (1000 Units /25 mcg) tablet 1 Tab  1,000 Units Oral DAILY  clopidogreL (PLAVIX) tablet 75 mg  75 mg Oral DAILY  levothyroxine (SYNTHROID) tablet 88 mcg  88 mcg Oral ACB  therapeutic multivitamin (THERAGRAN) tablet 1 Tab  1 Tab Oral DAILY  nitroglycerin (NITROSTAT) tablet 0.4 mg  0.4 mg SubLINGual PRN  
 insulin lispro (HUMALOG) injection   SubCUTAneous AC&HS  
 glucose chewable tablet 16 g  4 Tab Oral PRN  
 glucagon (GLUCAGEN) injection 1 mg  1 mg IntraMUSCular PRN  
  dextrose 10% infusion 0-250 mL  0-250 mL IntraVENous PRN Wilber France. MASOUD Patel MD 
 
 [facility] [address] [facility] 
 [phone]

## 2020-08-15 NOTE — PROGRESS NOTES
[address] [facility] Adult  Hospitalist Group Hospitalist Progress Note [address] [facility], DO Answering service: [phone] OR [phone] from in house phone Date of Service:  8/15/2020 NAME:  Rachel Benitez :  1935 MRN:  [id number] Admission Summary: Rachel Benitez is a 82 y.o. female who presents for evaluation of shortness of breath. Patient has history of congestive heart failure, last echocardiogram in 2020 with EF of 40 to 45%. She is currently on Lasix 40 mg daily which she states has been compliant with. She denies intake of excessive salt. Patient was doing fine yesterday and went to bed. This morning when she woke up, patient had acute onset shortness of breath and certainly presented to the emergency room. Patient was subsequently put on BiPAP. Further work-up reveals a patient with mild elevation of troponin as well as BNP and chest x-ray reveals pulmonary edema. Patient currently denies chest pain at this time. Hospitalist service requested to admit the patient for further evaluation and management. Interval history / Subjective: Follow up shortness of breath. Patient seen and examined earlier today. Echo with EF 25%, worse from prior. Initiated on lisinopril. Discussed with son at bedside. Son concerned about patient's ability to mobilize at home. Will have PT evaluate. Son's goal is for mom to return to prior level of functioning before leaving hospital. Discussed that this may not be realistic given acute changes in health. Assessment & Plan:  
 
Acute on chronic systolic Congestive heart failure Coronary artery disease, triple vessel, non CABG candidate  
-Acute on chronic congestive heart failure, last known EF of 40 to 45% 
--new echo with EF 25% 
-continue BB, add ACE-I 
-transition to lasix 40 mg BID oral  
-close outpatient cards follow up Acute hypoxic respiratory failure: resolved 
-required bipap on admission, now on room air 
-Continue Lasix  
  
Elevated troponin suspected secondary to NSTEMI  
-History of coronary artery disease 
-no intervention per cardiology 
-Continue Plavix and beta-blocker and medically treat her Hypertension 
-Continue Coreg and Lasix 
-Blood pressure stable 
  
Diabetes -Insulin sliding scale coverage 
-Ha1c 8.8  
  
Hypothyroidism 
-Continue Synthroid 
  
Metastatic breast cancer with bleeding left breast mass. Breast surgeon saw the patient Local wound care For the management per [facility] oncologist Dr. Joya Dai Code status: full DVT prophylaxis: scd Care Plan discussed with: Patient/Family Anticipated Disposition: Home w/Family Anticipated Discharge: 24-48 hours Hospital Problems  Date Reviewed: 7/23/2020 Codes Class Noted POA  
 CH[facility]congestive heart failure[facility] ICD-10-CM: I50.9 ICD-9-CM: 428.0  8/12/2020 Unknown Review of Systems: A comprehensive review of systems was negative except for that written in the HPI. Vital Signs:  
 Last 24hrs VS reviewed since prior progress note. Most recent are: 
Visit Vitals /53 (BP 1 Location: Left arm, BP Patient Position: At rest) Pulse 86 Temp 98.2 °F (36.8 °C) Resp 16 Ht 5' 1\" (1.549 m) Wt 60.3 kg (132 lb 15 oz) SpO2 96% BMI 25.12 kg/m² Intake/Output Summary (Last 24 hours) at 8/15/2020 1605 Last data filed at 8/15/2020 1900 Gross per 24 hour Intake  Output 200 ml Net -200 ml Physical Examination:  
 
 
     
Constitutional:  no distress, comfortable ENT:  Oral mucosa moist, oropharynx benign. Resp:  left basilar crackles, No wheezing/rhonchi/rales. No accessory muscle use CV:  regular rate and rhythm, no murmurs GI:  Soft, non distended, non tender. normoactive bowel sounds, no hepatosplenomegaly Musculoskeletal:  No edema, warm, 2+ pulses throughout Neurologic:  Moves all extremities. AAOx3 Data Review:  
 Review and/or order of clinical lab test 
 
 
Labs:  
 
Recent Labs 08/14/20 
0449 08/12/20 1917 WBC 4.1 5.7 HGB 7.5* 8.3* HCT 24.8* 28.0*  
 181 Recent Labs 08/15/20 
8503 08/14/20 
[phone] 08/13/20 
7236 * 136 138  
K 3.3* 3.5 3.6  103 104 CO2 25 25 26 BUN 40* 42* 38* CREA 1.21* 1.26* 1.23* * 204* 165* CA 7.9* 8.1* 8.4* MG 2.0  --   --   
PHOS 2.6  --   -- No results for input(s): ALT, AP, TBIL, TBILI, TP, ALB, GLOB, GGT, AML, LPSE in the last 72 hours. No lab exists for component: SGOT, GPT, AMYP, HLPSE Recent Labs 08/13/20 
0434 08/12/20 1917 APTT 32.5* 31.7 No results for input(s): FE, TIBC, PSAT, FERR in the last 72 hours. No results found for: FOL, RBCF No results for input(s): PH, PCO2, PO2 in the last 72 hours. Recent Labs 08/12/20 1917 08/12/20 
1625 TROIQ 4.35* 2.44* Lab Results Component Value Date/Time Cholesterol, total 74 04/16/2018 04:21 AM  
 HDL Cholesterol 25 04/16/2018 04:21 AM  
 LDL, calculated 31.6 04/16/2018 04:21 AM  
 Triglyceride 87 04/16/2018 04:21 AM  
 CHOL/HDL Ratio 3.0 04/16/2018 04:21 AM  
 
Lab Results Component Value Date/Time Glucose (POC) 292 (H) 08/15/2020 11:50 AM  
 Glucose (POC) 228 (H) 08/15/2020 07:36 AM  
 Glucose (POC) 291 (H) 08/14/2020 09:46 PM  
 Glucose (POC) 255 (H) 08/14/2020 04:34 PM  
 Glucose (POC) 318 (H) 08/14/2020 11:54 AM  
 
Lab Results Component Value Date/Time  Color YELLOW/STRAW 07/16/2020 03:02 PM  
 Appearance CLEAR 07/16/2020 03:02 PM  
 Specific gravity 1.007 07/16/2020 03:02 PM  
 pH (UA) 6.5 07/16/2020 03:02 PM  
 Protein TRACE (A) 07/16/2020 03:02 PM  
 Glucose Negative 07/16/2020 03:02 PM  
 Ketone Negative 07/16/2020 03:02 PM  
 Bilirubin Negative 07/16/2020 03:02 PM  
 Urobilinogen 0.2 07/16/2020 03:02 PM  
 Nitrites Negative 07/16/2020 03:02 PM  
 Leukocyte Esterase MODERATE (A) 07/16/2020 03:02 PM  
 Epithelial cells FEW 07/16/2020 03:02 PM  
 Bacteria Negative 07/16/2020 03:02 PM  
 WBC 20-50 07/16/2020 03:02 PM  
 RBC 0-5 07/16/2020 03:02 PM  
 
 
 
Medications Reviewed:  
 
Current Facility-Administered Medications Medication Dose Route Frequency  lisinopriL (PRINIVIL, ZESTRIL) tablet 5 mg  5 mg Oral DAILY  furosemide (LASIX) tablet 40 mg  40 mg Oral BID  potassium chloride SR (KLOR-CON 10) tablet 40 mEq  40 mEq Oral DAILY  acetaminophen (TYLENOL) tablet 650 mg  650 mg Oral Q6H PRN  
 aspirin delayed-release tablet 81 mg  81 mg Oral DAILY  carvediloL (COREG) tablet 6.25 mg  6.25 mg Oral BID  cholecalciferol (VITAMIN D3) (1000 Units /25 mcg) tablet 1 Tab  1,000 Units Oral DAILY  clopidogreL (PLAVIX) tablet 75 mg  75 mg Oral DAILY  levothyroxine (SYNTHROID) tablet 88 mcg  88 mcg Oral ACB  therapeutic multivitamin (THERAGRAN) tablet 1 Tab  1 Tab Oral DAILY  nitroglycerin (NITROSTAT) tablet 0.4 mg  0.4 mg SubLINGual PRN  
 insulin lispro (HUMALOG) injection   SubCUTAneous AC&HS  
 glucose chewable tablet 16 g  4 Tab Oral PRN  
 glucagon (GLUCAGEN) injection 1 mg  1 mg IntraMUSCular PRN  
 dextrose 10% infusion 0-250 mL  0-250 mL IntraVENous PRN  
 
______________________________________________________________________ EXPECTED LENGTH OF STAY: 3d 17h ACTUAL LENGTH OF STAY:          3 [address] [facility]

## 2020-08-15 NOTE — PROGRESS NOTES
1745-agree with Camila Groves charting/assessment Son request for PT to come and see the pt-PT pager called 4 x -no answer-

## 2020-08-15 NOTE — PROGRESS NOTES
MOY 
- RUR 42% - Patient likely to discharge over weekend CM placed contact call to [facility] [phone] and left a voice message asking for a return call for confirmation if patient has been accepted for [facility] services. 9:14 AM 
CM placed contact call to [address] [phone] and left a voice message asking for a return call for confirmation if patient has been accepted for [facility] services. 9:28 AM 
CM placed contact call to [facility] [phone] and left a message with answering services for a return phone call.   
 
Shelby Chen, CRM

## 2020-08-16 NOTE — PROGRESS NOTES
Attempted to get PT to see pt prior to discharge but has not come yet. Pts son at bedside and witnessed pt walk in hallway with walker and is comfortable taking her home without PT coming again. Discharge meds and instructions reviewed with pts son at bedside, mPOA and paperwork signed. IV out, no redness or swelling. Tele off, box returned. Given permission to contact with any further questions after discharge. Verbalizes understanding of med changes and follow up appts. Pt left with all personal belongings. Escorted off unit in wheelchair by staff.

## 2020-08-16 NOTE — DISCHARGE INSTRUCTIONS
Discharge Instructions       PATIENT ID: Sanket Walker  MRN: [id number]   YOB: 1935    DATE OF ADMISSION: 8/12/2020 11:53 AM    DATE OF DISCHARGE: 8/16/2020    PRIMARY CARE PROVIDER: Tasneem Millard MD     ATTENDING PHYSICIAN: Lana Whelan DO  DISCHARGING PROVIDER: Sukumar Davis DO    To contact this individual call [phone] and ask the  to page. If unavailable ask to be transferred the Adult Hospitalist Department. DISCHARGE DIAGNOSES     Heart failure with reduced ejection fraction (systolic heart failure)    CONSULTATIONS: IP CONSULT TO HOSPITALIST  IP CONSULT TO CARDIOLOGY  IP CONSULT TO CARDIOLOGY  IP CONSULT TO GENERAL SURGERY    PROCEDURES/SURGERIES: * No surgery found *    PENDING TEST RESULTS:   At the time of discharge the following test results are still pending: none    FOLLOW UP APPOINTMENTS:   Follow-up Information     Follow up With Specialties Details Why Contact Info    Tasneem Millard MD Family Medicine   [address] [place] [address] [id number]             ADDITIONAL CARE RECOMMENDATIONS:     New medications:   -change lasix to 40 mg twice a day  -start taking lisinopril 5 mg once daily    Follow up with cardiology this week. DISCHARGE MEDICATIONS:   See Medication Reconciliation Form    · It is important that you take the medication exactly as they are prescribed. · Keep your medication in the bottles provided by the pharmacist and keep a list of the medication names, dosages, and times to be taken in your wallet. · Do not take other medications without consulting your doctor. NOTIFY YOUR PHYSICIAN FOR ANY OF THE FOLLOWING:   Fever over 101 degrees for 24 hours. Chest pain, shortness of breath, fever, chills, nausea, vomiting, diarrhea, change in mentation, falling, weakness, bleeding. Severe pain or pain not relieved by medications. Or, any other signs or symptoms that you may have questions about.       Signed: Tanna Meza DO  8/16/2020  10:26 AM

## 2020-08-16 NOTE — PROGRESS NOTES
Problem: Diabetes Self-Management Goal: *Disease process and treatment process Description: Define diabetes and identify own type of diabetes; list 3 options for treating diabetes. Outcome: Progressing Towards Goal 
Goal: *Using medications safely Description: State effect of diabetes medications on diabetes; name diabetes medication taking, action and side effects. Outcome: Progressing Towards Goal 
Goal: *Monitoring blood glucose, interpreting and using results Description: Identify recommended blood glucose targets  and personal targets. Outcome: Progressing Towards Goal 
  
Problem: Pressure Injury - Risk of 
Goal: *Prevention of pressure injury Description: Document Calderon Scale and appropriate interventions in the flowsheet. Outcome: Progressing Towards Goal 
Note: Pressure Injury Interventions: 
Sensory Interventions: Assess changes in LOC, Keep linens dry and wrinkle-free, Maintain/enhance activity level, Minimize linen layers Moisture Interventions: Absorbent underpads Activity Interventions: Increase time out of bed Mobility Interventions: Assess need for specialty bed, HOB 30 degrees or less, Pressure redistribution bed/mattress (bed type) Nutrition Interventions: Document food/fluid/supplement intake Friction and Shear Interventions: Apply protective barrier, creams and emollients, HOB 30 degrees or less, Minimize layers Problem: Patient Education: Go to Patient Education Activity Goal: Patient/Family Education Outcome: Progressing Towards Goal 
  
Problem: Falls - Risk of 
Goal: *Absence of Falls Description: Document Yobani Ravi Fall Risk and appropriate interventions in the flowsheet. Outcome: Progressing Towards Goal 
Note: Fall Risk Interventions: 
Mobility Interventions: Communicate number of staff needed for ambulation/transfer, Patient to call before getting OOB Mentation Interventions: Adequate sleep, hydration, pain control, Door open when patient unattended, Eyeglasses and hearing aids, Toileting rounds Medication Interventions: Evaluate medications/consider consulting pharmacy, Patient to call before getting OOB, Teach patient to arise slowly Elimination Interventions: Call light in reach, Patient to call for help with toileting needs History of Falls Interventions: Door open when patient unattended Problem: Patient Education: Go to Patient Education Activity Goal: Patient/Family Education Outcome: Progressing Towards Goal 
  
Problem: Discharge Planning Goal: *Discharge to safe environment Outcome: Progressing Towards Goal

## 2020-08-16 NOTE — PROGRESS NOTES
Bedside and Verbal shift change report given to paco (oncoming nurse) by Jamil Urrutia (offgoing nurse). Report included the following information SBAR, Kardex, Intake/Output, MAR, Recent Results and Cardiac Rhythm nsr.

## 2020-08-16 NOTE — PROGRESS NOTES
Problem: Pressure Injury - Risk of 
Goal: *Prevention of pressure injury Description: Document Calderon Scale and appropriate interventions in the flowsheet. Outcome: Progressing Towards Goal 
Note: Pressure Injury Interventions: 
Sensory Interventions: Avoid rigorous massage over bony prominences, Minimize linen layers, Maintain/enhance activity level Moisture Interventions: Apply protective barrier, creams and emollients Activity Interventions: Increase time out of bed, Chair cushion Mobility Interventions: Float heels Nutrition Interventions: Discuss nutritional consult with provider, Document food/fluid/supplement intake Friction and Shear Interventions: Feet elevated on foot rest

## 2020-08-16 NOTE — DISCHARGE SUMMARY
Discharge Summary PATIENT ID: Beatriz Jimenez MRN: [id number] YOB: 1935 DATE OF ADMISSION: 8/12/2020 11:53 AM   
DATE OF DISCHARGE: 8/16/2020 PRIMARY CARE PROVIDER: Key Kamara MD  
 
ATTENDING PHYSICIAN: Mik Cox DO 
DISCHARGING PROVIDER: [address], DO To contact this individual call [phone] and ask the  to page. If unavailable ask to be transferred the Adult Hospitalist Department. CONSULTATIONS: IP CONSULT TO CARDIOLOGY 
IP CONSULT TO CARDIOLOGY PROCEDURES/SURGERIES: * No surgery found * [address] COURSE:  
 
53-year-old female with past medical history CAD, heart failure, presenting to [facility] [address] [place] [address] with shortness of breath. Found to be hypoxic and required BiPAP on admission. Chest x-ray with pulmonary edema. Troponin elevated up to 4.35, no acute ST changes on EKG. Cardiology consulted. Left heart cath deferred due to patient with known three-vessel disease and not a candidate for CABG. Echocardiogram with EF 25%, reduced from prior 40 to 45% in 4/2020. Patient treated with IV diuresis and symptomatically improved. She was stable to discharge home with close outpatient cardiology follow-up. Chest x-ray: 
IMPRESSION: 
  
Mild diffuse interstitial opacities suggesting pulmonary edema. Stable scattered 
sclerotic bony metastases. Echocardiogram: 
· LV: Calculated LVEF is 25%. Visually measured ejection fraction. Normal cavity size and wall thickness. Moderate-to-severely reduced systolic function. Wall motion: normal. 
· AV: Aortic valve leaflet calcification present with reduced excursion. Aortic valve mean gradient is 16 mmHg. Mild to moderate aortic valve stenosis is present. · MV: Mild mitral valve regurgitation is present. · Pericardium: Small-to-moderate circumferential pericardial effusion. there is no evidence of tamponade.  
 
DISCHARGE DIAGNOSES / PLAN:   
 
 Acute on chronic systolic Congestive heart failure Coronary artery disease, triple vessel, non CABG candidate  
-Acute on chronic congestive heart failure, last known EF of 40 to 45% 
--new echo with EF 25% 
-continue BB, add ACE-I 
-transition to lasix 40 mg BID oral  
-close outpatient cards follow up  
  
Acute hypoxic respiratory failure: resolved 
-required bipap on admission, now on room air 
-Continue Lasix  
  
Elevated troponin suspected secondary to NSTEMI  
-History of coronary artery disease 
-no intervention per cardiology 
-Continue Plavix and beta-blocker and medically treat her  
  
Hypertension 
-Continue Coreg and Lasix 
-Blood pressure stable 
  
Diabetes -Insulin sliding scale coverage 
-Ha1c 8.8  
  
Hypothyroidism 
-Continue Synthroid 
  
Metastatic breast cancer with bleeding left breast mass. Breast surgeon saw the patient Local wound care For the management per [facility] ADDITIONAL CARE RECOMMENDATIONS:  
New medications:  
-change lasix to 40 mg twice a day 
-start taking lisinopril 5 mg once daily Follow up with cardiology this week. PENDING TEST RESULTS:  
At the time of discharge the following test results are still pending: none FOLLOW UP APPOINTMENTS:   
Follow-up Information Follow up With Specialties Details Why Contact Info Ann Eubanks MD Family Medicine   [address] [place] [address] 
[phone] [address] [place] [address]. 
[address] Floor [place] [address] 
[phone] DISCHARGE MEDICATIONS: 
Discharge Medication List as of 8/16/2020 12:05 PM  
  
START taking these medications Details  
lisinopriL (PRINIVIL, ZESTRIL) 5 mg tablet Take 1 Tab by mouth daily. , Normal, Disp-30 Tab,R-0 CONTINUE these medications which have CHANGED  Details  
furosemide (LASIX) 40 mg tablet Take 1 Tab by mouth two (2) times a day., Normal, Disp-60 Tab,R-0  
  
  
 CONTINUE these medications which have NOT CHANGED Details  
carvediloL (Coreg) 6.25 mg tablet Take 6.25 mg by mouth two (2) times daily (with meals). , Historical Med  
  
clopidogreL (PLAVIX) 75 mg tab TAKE ONE TABLET BY MOUTH DAILY, Normal, Disp-30 Tab, R-5  
  
cholecalciferol (Vitamin D3) 25 mcg (1,000 unit) cap Take 1,000 Units by mouth daily. , Historical Med  
  
glucosam/wesley-msm1/C/bassam/bosw (OSTEO BI-FLEX TRIPLE STRENGTH PO) Take 2 Tabs by mouth daily. , Historical Med  
  
evolocumab (Repatha SureClick) pen injection 385 mg by SubCUTAneous route every fourteen (14) days. , Historical Med  
  
insulin aspart U-100 (NOVOLOG FLEXPEN U-100 INSULIN) 100 unit/mL (3 mL) inpn by SubCUTAneous route Before breakfast, lunch, dinner and at bedtime. 2-3 units depending on blood sugar before meals. (Note: son states very sensitive.), Historical Med  
  
multivitamin (ONE A DAY) tablet Take 1 Tab by mouth daily. , Historical Med  
  
insulin degludec (TRESIBA FLEXTOUCH U-100) 100 unit/mL (3 mL) inpn 14 Units by SubCUTAneous route daily. , Historical Med  
  
nitroglycerin (NITROSTAT) 0.4 mg SL tablet 1 Tab by SubLINGual route as needed for Chest Pain. Up to 3 doses. , No Print, Disp-40 Tab, R-0  
  
levothyroxine (SYNTHROID) 88 mcg tablet Take 88 mcg by mouth Daily (before breakfast). , Historical Med  
  
aspirin delayed-release 81 mg tablet Take 81 mg by mouth daily. , Historical Med  
  
OMEGA-3 FATTY ACIDS/FISH OIL (OMEGA 3 FISH OIL PO) Take  by mouth daily. , Historical Med NOTIFY YOUR PHYSICIAN FOR ANY OF THE FOLLOWING:  
Fever over 101 degrees for 24 hours. Chest pain, shortness of breath, fever, chills, nausea, vomiting, diarrhea, change in mentation, falling, weakness, bleeding. Severe pain or pain not relieved by medications. Or, any other signs or symptoms that you may have questions about. DISPOSITION: 
x  Home With: 
 OT  PT  HH  RN  
  
 Long term SNF/Inpatient Rehab Independent/assisted living Hospice Other:  
 
 
PATIENT CONDITION AT DISCHARGE:  
 
Functional status Poor   
x Deconditioned Independent Cognition  
x  Lucid Forgetful Dementia Catheters/lines (plus indication) [place] PICC   
 PEG   
x None Code status  
 x Full code DNR   
 
PHYSICAL EXAMINATION AT DISCHARGE: 
Constitutional:  no distress, comfortable ENT:  Oral mucosa moist, oropharynx benign. Resp:  left basilar crackles, No wheezing/rhonchi/rales. No accessory muscle use CV:  regular rate and rhythm, no murmurs GI:  Soft, non distended, non tender. normoactive bowel sounds, no hepatosplenomegaly Musculoskeletal:  No edema, warm, 2+ pulses throughout Neurologic:  Moves all extremities. AAOx3 CHRONIC MEDICAL DIAGNOSES: 
Problem List as of 8/16/2020 Date Reviewed: 7/23/2020 Codes Class Noted - Resolved CHF (congestive heart failure) (HCC) ICD-10-CM: I50.9 ICD-9-CM: 428.0  8/12/2020 - Present Bacteremia due to Gram-negative bacteria ICD-10-CM: R78.81 ICD-9-CM: 790.7, 041.85  4/22/2020 - Present UTI (urinary tract infection) ICD-10-CM: N39.0 ICD-9-CM: 599.0  4/19/2020 - Present Septic shock (HCC) ICD-10-CM: A41.9, R65.21 ICD-9-CM: 038.9, 785.52, 995.92  12/25/2019 - Present Dizziness ICD-10-CM: U41 ICD-9-CM: 780.4  8/25/2019 - Present Type 2 diabetes with nephropathy ([facility] ICD-10-CM: E11.21 
ICD-9-CM: 250.40, 583.81  8/20/2018 - Present CAD (coronary artery disease) ICD-10-CM: I25.10 ICD-9-CM: 414.00  6/21/2018 - Present Metastatic breast cancer ([facility] ICD-10-CM: A56.770 ICD-9-CM: 174.9  6/1/2018 - Present CKD (chronic kidney disease) stage 3, GFR 30-59 ml/min (HCC) ICD-10-CM: N18.3 ICD-9-CM: 585.3  10/25/2017 - Present Vitamin D deficiency ICD-10-CM: E55.9 ICD-9-CM: 268.9  6/16/2017 - Present Asymptomatic hyperuricemia ICD-10-CM: E79.0 ICD-9-CM: 790.6  2/16/2017 - Present Statin intolerance ICD-10-CM: Z78.9 ICD-9-CM: 995.27  12/21/2016 - Present Long-term use of immunosuppressant medication ICD-10-CM: Z79.899 ICD-9-CM: V58.69  11/21/2016 - Present Seropositive rheumatoid arthritis of multiple sites [facility]) ICD-10-CM: M05.79 ICD-9-CM: 714.0  9/28/2016 - Present Primary osteoarthritis of both knees ICD-10-CM: M17.0 ICD-9-CM: 715.16  9/28/2016 - Present Weakness due to cerebrovascular accident ICD-10-CM: VCC8145 ICD-9-CM: NAY5268  4/2/2012 - Present PAD (peripheral artery disease) (HCC) ICD-10-CM: I73.9 ICD-9-CM: 443.9  9/7/2011 - Present Carotid bruit ICD-10-CM: R09.89 ICD-9-CM: 785.9  8/31/2011 - Present Hyperlipidemia ICD-10-CM: E78.5 ICD-9-CM: 272.4  1/27/2010 - Present Type 2 diabetes mellitus with neurologic complication, with long-term current use of insulin (HCC) ICD-10-CM: E11.49, Z79.4 ICD-9-CM: 250.60, V58.67  9/2/2009 - Present Colon cancer [facility]) ICD-10-CM: C18.9 ICD-9-CM: 153.9  9/2/2009 - Present Age-related osteoporosis without current pathological fracture ICD-10-CM: M81.0 ICD-9-CM: 733.01  9/2/2009 - Present HTN, goal below 140/90 ICD-10-CM: I10 
ICD-9-CM: 401.9  9/2/2009 - Present Anemia ICD-10-CM: D64.9 ICD-9-CM: 285.9  9/2/2009 - Present RESOLVED: Systemic inflammatory response syndrome (HCC) ICD-10-CM: R65.10 ICD-9-CM: 995.90  4/19/2020 - 7/23/2020 RESOLVED: Acute respiratory failure ([address] ICD-10-CM: J96.00 
ICD-9-CM: 518.81  6/21/2018 - 8/8/2019 RESOLVED: Elevated troponin ICD-10-CM: R79.89 ICD-9-CM: 790.6  6/21/2018 - 8/8/2019 RESOLVED: Acute renal failure superimposed on stage 3 chronic kidney disease (HCC) ICD-10-CM: N17.9, N18.3 ICD-9-CM: 584.9, 585.3  6/21/2018 - 2/18/2019 RESOLVED: Gout flare ICD-10-CM: M10.9 ICD-9-CM: 274.01  6/21/2018 - 8/8/2019 RESOLVED: Hyperglycemia due to type 2 diabetes mellitus ([facility] ICD-10-CM: E11.65 ICD-9-CM: 250.00  6/21/2018 - 8/8/2019 RESOLVED: Fluid overload ICD-10-CM: E87.70 ICD-9-CM: 276.69  6/1/2018 - 6/18/2018 RESOLVED: Goals of care, counseling/discussion ICD-10-CM: Z71.89 ICD-9-CM: V65.49  6/1/2018 - 8/8/2019 RESOLVED: Acute on chronic systolic HF (heart failure) (HCC) ICD-10-CM: P01.09 ICD-9-CM: 428.23  5/19/2018 - 7/23/2020 RESOLVED: Acute systolic heart failure (HCC) ICD-10-CM: I50.21 ICD-9-CM: 428.21  4/24/2018 - 8/8/2019 RESOLVED: Altered mental status, unspecified ICD-10-CM: R41.82 
ICD-9-CM: 780.97  4/23/2018 - 8/8/2019 RESOLVED: SOB (shortness of breath) ICD-10-CM: R06.02 
ICD-9-CM: 786.05  4/16/2018 - 7/4/2018 RESOLVED: Occlusion and stenosis of carotid artery without mention of cerebral infarction ICD-10-CM: I65.29 ICD-9-CM: 433.10  8/23/2013 - 8/8/2019 RESOLVED: Neuropathy in diabetes ([facility] ICD-10-CM: E11.40 ICD-9-CM: 250.60, 357.2  4/2/2012 - 8/8/2019 RESOLVED: Claudication ([facility] ICD-10-CM: I73.9 ICD-9-CM: 443.9  8/31/2011 - 8/8/2019 RESOLVED: Weakness of left arm ICD-10-CM: R29.898 ICD-9-CM: 729.89  8/31/2011 - 8/8/2019 RESOLVED: Hypothyroid ICD-10-CM: E03.9 ICD-9-CM: 244.9  9/2/2009 - 8/8/2019 RESOLVED: H/O: CVA ICD-9-CM: V12.54  9/2/2009 - 8/8/2019 RESOLVED: Microalbuminuria ICD-10-CM: R80.9 ICD-9-CM: 791.0  9/2/2009 - 8/8/2019 Greater than 30 minutes were spent with the patient on counseling and coordination of care Signed:  
[facility],  
8/16/2020 
5:30 PM

## 2020-08-16 NOTE — PROGRESS NOTES
MOY: 
RUR:  42% Discharge plan:  Home with [place] (PT/OT). CM called [facility]  ([phone]). CM left message to have call returned re status of [place] referral. 
 
CM received return call from Maribell Harley, [place] with [facility]. Patient is approved for servicing [facility] TERM ACUTE [facility] CARE AT [facility] PT/OT). Updates placed on AVS. Frances Ramirez, [address], [address] [facility]

## 2020-08-17 PROBLEM — I50.9 HEART FAILURE (HCC): Status: ACTIVE | Noted: 2020-01-01

## 2020-08-17 NOTE — PROGRESS NOTES
Transition of Care Plan: · RUR:  45 % GLOS:  Not Assigned  LOS:  0 Core Measure · Readmission:  Discharged 8/16, back to ED within 10 hours worsening symptoms · Dx: Fever, Dyspnea, Acute on Chronic CHF, Pericardial Effusion · Admitted to Hospitalist,   Cardiology Consult, Palliative Care Consult Requested · Disposition:  Return to home with family, they have paid caregivers, and son coordinates care · Transportation:  Likely family will transport home Reason for Readmission:     Fever, shortness of breath RUR Score/Risk Level:     45% High Risk for Readmission PCP: First and Last name:  Dr. Rigoberto Sawyer Name of Practice:   [facility] Are you a current patient: Yes/No:   Yes Approximate date of last visit:   7/20/20 Can you participate in a virtual visit with your PCP:   With help of family Is a Care Conference indicated:      TBD Did you attend your follow up appointment (s): If not, why not:   Readmission, Readmitted same day of discharge Resources/supports as identified by patient/family:   Sons, they have paid caregivers Top Challenges facing patient (as identified by patient/family and CM): Finances/Medication cost?     [facility] [facility]      Son transport to and from MD appointments Support system or lack thereof? Good supportive network Living arrangements? Lives with sons, caregivers 8 hours daily Self-care/ADLs/Cognition? Dependent for care Current Advanced Directive/Advance Care Plan:  Spoke with son, he has spoken to his mother about her disease process and she wants to have ventilation and chest compressions. Discussed Palliative Care Consult could help define goals of care for his mother. AMD should be completed while she is in hospital and able to make her wishes known. Plan for utilizing home health:   Discharged with [address] Eber Swenson is an 82year old female to [facility], she had been discharged earlier in the day. Son noted she had fever and was hypoxic, sats 88%. ED work up completed, [employer], Cardiology Consulted. COVID testing. Inpatient orders written for intermediate level of care. Verified face sheet/demographics with son. **Son does want ventilation and compressions for his mother, noted DNR Code Status** 
 
NOK:  Dr. Mikel Edward   [phone] Care Management Interventions PCP Verified by CM: Yes(Dr. Hannah Neal) Last Visit to PCP: 07/16/20 Palliative Care Criteria Met (RRAT>21 & CHF Dx)?: Yes 
Palliative Consult Recommended?: Yes Transition of Care Consult (CM Consult): Discharge Planning, Other(Initial Assessment, Readmission, COVID R/O) MyChart Signup: No 
Discharge Durable Medical Equipment: No(DME:  Walker, wheelchair, and CPAP) Physical Therapy Consult: No 
Occupational Therapy Consult: No 
Speech Therapy Consult: Yes Current Support Network: Relative's Home(Lives with son on first floor, supportive sons, hired caregiver daily for few hours) Readmission Assessment Number of days since last admission?: 1-7 days Previous disposition: Home with Home Health Who is being interviewed?: Caregiver(Son Robin Ledesma) What was the patient's/caregiver's perception as to why they think they needed to return back to the hospital?: Other (Comment)(She devleoped fever and 02 sats dropped) Did you visit your Primary Care Physician after you left the hospital, before you returned this time?: No 
Why weren't you able to visit your PCP?: Other (Comment)(Discharged day prior) Did you see a specialist, such as Cardiac, Pulmonary, Orthopedic Physician, etc. after you left the hospital?: No(Discharged day prior) Who advised the patient to return to the hospital?: Caregiver Does the patient report anything that got in the way of taking their medications?: No 
 In our efforts to provide the best possible care to you and others like you, can you think of anything that we could have done to help you after you left the hospital the first time, so that you might not have needed to return so soon?: Additional Community resources available for illness support Analia Cordero RN, BSN, [facility] 
ED Care Management [phone]

## 2020-08-17 NOTE — ACP (ADVANCE CARE PLANNING)
Advance Care Planning Advance Care Planning Activator (Inpatient) Conversation Note Date of ACP Conversation: 08/17/20 Conversation Conducted with:   Daane Ying ACP Activator: Ryan Duenas RN Health Care Decision Maker: 
 
Current Designated Health Care Decision Maker:   Primary Decision Maker: Elder Ceci Ying - [phone] Care Preferences Ventilation: \"If you were in your present state of health and suddenly became very ill and were unable to breathe on your own, what would your preference be about the use of a ventilator (breathing machine) if it were available to you? \" If patient would desire the use of a ventilator (breathing machine), answer \"yes\", if not \"no\":yes \"If your health worsens and it becomes clear that your chance of recovery is unlikely, what would your preference be about the use of a ventilator (breathing machine) if it were available to you? \"  Ronan Smoke Would the patient desire the use of a ventilator (breathing machine)? YES Resuscitation \"CPR works best to restart the heart when there is a sudden event, like a heart attack, in someone who is otherwise healthy. Unfortunately, CPR does not typically restart the heart for people who have serious health conditions or who are very sick. \" \"In the event your heart stopped as a result of an underlying serious health condition, would you want attempts to be made to restart your heart (answer \"yes\" for attempt to resuscitate) or would you prefer a natural death (answer \"no\" for do not attempt to resuscitate)? \" yes [x] Yes  [] No   Educated Patient / Brendan Mulligan regarding differences between Advance Directives and portable DNR orders. Length of ACP Conversation in minutes:  35 
 
Conversation Outcomes: 
[x] ACP discussion completed 
[] Existing advance directive reviewed with patient; no changes to patient's previously recorded wishes 
 
 [] New Advance Directive completed [] Portable Do Not Resuscitate prepared for Provider review and signature 
[] POLST/POST/MOLST/MOST prepared for Provider review and signature Follow-up plan:   
[x] Schedule follow-up conversation to continue planning 
[x] Referred individual to Provider for additional questions/concerns [x] Advised patient/agent/surrogate to review completed ACP document and update if needed with changes in condition, patient preferences or care setting  
 
[x] This note routed to one or more involved healthcare providers Spoke with son, he has spoken to his mother about her disease process and she wants to have ventilation and chest compressions. Discussed Palliative Care Consult could help define goals of care for his mother. AMD should be completed while she is in hospital and able to make her wishes known. Martín Hamilton RN, BSN, [facility] Care Management [phone]

## 2020-08-17 NOTE — ED PROVIDER NOTES
HPI  
 
82-year-old female with a history of congestive heart failure, coronary artery disease, chronic kidney disease, diabetes, acid reflux, colon cancer, breast cancer, presents the emergency department with shortness of breath and fever. Patient was just discharged this evening after admission for worsening heart failure. She presented 5 days ago in acute respiratory distress and was put on BiPAP. She was eventually weaned off oxygen. Per the son her EF had gone down to 25%, she had elevated troponins but not a candidate for bypass surgery (no cath was done this admission) patient was discharged this afternoon off oxygen. Patient son reports since she is gotten home she has become more lethargic. Tonight she seemed to be having difficulty breathing again they checked her temperature was 100.5. They gave her Tylenol. They also checked her oxygen at home and it was anywhere from 82 to 88%. She has had an increased wet cough since discharge. Patient denies any chest pain. Past Medical History:  
Diagnosis Date  Acute on chronic systolic HF (heart failure) ([address] 5/19/2018  Age-related osteoporosis without current pathological fracture 9/2/2009  Anemia 9/2/2009  Asymptomatic hyperuricemia 2/16/2017  Breast cancer ([address]  CAD (coronary artery disease) 6/21/2018  Carotid bruit 8/31/2011  CHF (congestive heart failure) ([address]  CKD (chronic kidney disease) stage 3, GFR 30-59 ml/min ([facility]) 10/25/2017  Colon cancer ([address] 9/2/2009  
 surgery/chemo  Colon cancer ([address] 9/2/2009  COVID-19   
 DM (diabetes mellitus) ([address] 9/2/2009  GERD (gastroesophageal reflux disease)  H/O: CVA 9/2/2009  
 slight l sided weakness  Heart attack ([address]  HTN, goal below 140/90 9/2/2009  Hypothyroid 9/2/2009  Ill-defined condition   
 seasonal allergies  Long-term use of immunosuppressant medication 11/21/2016  Metastatic breast cancer ([address] 6/1/2018  Microalbuminuria 2009  Osteoporosis 2009  Other and unspecified hyperlipidemia 2010  PAD (peripheral artery disease) ([facility] 2011  Primary osteoarthritis of both knees 2016  Rheumatoid arthritis involving ankle ([facility] 2016  Rheumatoid arthritis(714.0) 2009  Seropositive rheumatoid arthritis of multiple sites ([facility] [address] 2016  Statin intolerance 2016  Type 2 diabetes mellitus with neurologic complication, with long-term current use of insulin ([facility] [address] 2009  Type 2 diabetes [facility]ropathy [facility] 2018  Unspecified essential hypertension 2009  Vitamin D deficiency 2017  Weakness due to cerebrovascular accident Past Surgical History:  
Procedure Laterality Date  HX COLECTOMY  HX HYSTERECTOMY  HX OTHER SURGICAL    
 colonoscopies numerous since  Family History:  
Problem Relation Age of Onset  Diabetes Mother  Stroke Mother  Diabetes Sister  Other Sister   
     fell and hit her head -  of this  Diabetes Brother  Cancer Father   
     stomach  Diabetes Sister Social History Socioeconomic History  Marital status:  Spouse name: Not on file  Number of children: Not on file  Years of education: Not on file  Highest education level: Not on file Occupational History  Not on file Social Needs  Financial resource strain: Not on file  Food insecurity Worry: Not on file Inability: Not on file  Transportation needs Medical: Not on file Non-medical: Not on file Tobacco Use  Smoking status: Never Smoker  Smokeless tobacco: Never Used Substance and Sexual Activity  Alcohol use: No  
 Drug use: No  
 Sexual activity: Not Currently Partners: Male Lifestyle  Physical activity Days per week: Not on file Minutes per session: Not on file  Stress: Not on file Relationships  Social connections Talks on phone: Not on file Gets together: Not on file Attends Bahai service: Not on file Active member of club or organization: Not on file Attends meetings of clubs or organizations: Not on file Relationship status: Not on file  Intimate partner violence Fear of current or ex partner: Not on file Emotionally abused: Not on file Physically abused: Not on file Forced sexual activity: Not on file Other Topics Concern  Not on file Social History Narrative  Not on file ALLERGIES: Statins-hmg-coa reductase inhibitors and Sulfa (sulfonamide antibiotics) Review of Systems Constitutional: Positive for fatigue and fever. Respiratory: Positive for cough. Cardiovascular: Negative for leg swelling. Gastrointestinal: Negative for abdominal pain, nausea and vomiting. Genitourinary: Negative for dysuria. Musculoskeletal: Negative for gait problem. Skin: Negative for rash. Neurological: Negative for headaches. Psychiatric/Behavioral: Negative for dysphoric mood. Vitals:  
 08/16/20 2235 BP: 92/58 Pulse: 95 Resp: 16 Temp: 98.8 °F (37.1 °C) SpO2: 97% Physical Exam 
Constitutional:   
   General: She is not in acute distress. Appearance: She is well-developed. HENT:  
   Head: Normocephalic and atraumatic. Mouth/Throat:  
   Pharynx: No oropharyngeal exudate. Eyes:  
   General: No scleral icterus. Right eye: No discharge. Left eye: No discharge. Pupils: Pupils are equal, round, and reactive to light. Neck: Musculoskeletal: Normal range of motion and neck supple. Vascular: No JVD. Cardiovascular:  
   Rate and Rhythm: Normal rate and regular rhythm. Heart sounds: Normal heart sounds. No murmur. Pulmonary:  
   Effort: Pulmonary effort is normal. Tachypnea present. No respiratory distress. Breath sounds: No stridor. Wheezing present. No rales. Chest:  
   Chest wall: No tenderness. Abdominal:  
   General: Bowel sounds are normal. There is no distension. Palpations: Abdomen is soft. There is no mass. Tenderness: There is no abdominal tenderness. There is no guarding or rebound. Musculoskeletal: Normal range of motion. Skin: 
   General: Skin is warm and dry. Capillary Refill: Capillary refill takes less than 2 seconds. Findings: No rash. Neurological:  
   Mental Status: She is oriented to person, place, and time. Psychiatric:     
   Behavior: Behavior normal.     
   Thought Content: Thought content normal.     
   Judgment: Judgment normal.  
 
  
 
MDM Procedures ED EKG interpretation: 
Rhythm: normal sinus rhythm; and regular . Rate (approx.): 92; Axis: normal; P wave: normal; QRS interval: normal ; ST/T wave: non-specific changes; This EKG was interpreted by Helga Higuera MD,ED Provider. Labs ok. JEROD likely due to recent diuresis. CT shows large pericardial effusion and bilateral pleural effusions. WBC, lactate ok. INiital CXR concerning for infiltrate, so given fever at home she was cultured and Levaquin started. Hospitalist Perfect Serve for Admission 3:01 AM 
 
ED Room Number: MT18/32 Patient Name and age:  Fabienne Kate 82 y.o.  female Working Diagnosis: 1. Fever, unspecified fever cause 2. Dyspnea, unspecified type 3. Acute on chronic congestive heart failure, unspecified heart failure type ([facility] 4. Pericardial effusion COVID-19 Suspicion:  no 
 
Code Status:  Full Code Readmission: no 
Isolation Requirements:  no 
Recommended Level of Care:  telemetry Department:[facility] Adult ED - [phone] Other:  82year old female with metastatic breast ca, CHF, d/c'd yesterday evening after admission for CHF exacerbation. Got home and became more SOB with fever 100.5.   Family states pulse ox was in the 80's at home and she is more labored. Sats were fine here but she is tachypnic with rales on exam.  Initial cxr concerning for infiltrates, edema improved so she was cultured and Levaquin started. CT shows edema, pleural effusions and large pericardial effusion (They did see small to moderate effusion on echo on admission. Markedly depressed EF since April, not a interventional candidate)  Was DNR but family changed it to full code as they are getting second opinion from [facility] regarding her cancer.

## 2020-08-17 NOTE — PROGRESS NOTES
Admission Medication Reconciliation: 
 
Information obtained from:  AVS from discharge yesterday RxQuery data available¹:  YES Comments/Recommendations: Updated PTA meds/reviewed patient's allergies. 1)  Patient recently discharge less than 24 hours following a 5 day stay ¹RxQuery pharmacy benefit data reflects medications filled and processed through the patient's insurance, however  
this data does NOT capture whether the medication was picked up or is currently being taken by the patient. Allergies:  Statins-hmg-coa reductase inhibitors and Sulfa (sulfonamide antibiotics) Significant PMH/Disease States:  
Past Medical History:  
Diagnosis Date Acute on chronic systolic HF (heart failure) ([facility] 5/19/2018 Age-related osteoporosis without current pathological fracture 9/2/2009 Anemia 9/2/2009 Asymptomatic hyperuricemia 2/16/2017 Breast cancer ([facility] CAD (coronary artery disease) 6/21/2018 Carotid bruit 8/31/2011 CHF (congestive heart failure) ([facility] CKD (chronic kidney disease) stage 3, GFR 30-59 ml/min ([facility]) 10/25/2017 Colon cancer ([address] 9/2/2009  
 surgery/chemo Colon cancer ([facility] 9/2/2009 COVID-19 DM (diabetes mellitus) ([address] 9/2/2009 GERD (gastroesophageal reflux disease) H/O: CVA 9/2/2009  
 slight l sided weakness Heart attack ([address] HTN, goal below 140/90 9/2/2009 Hypothyroid 9/2/2009 Ill-defined condition   
 seasonal allergies Long-term use of immunosuppressant medication 11/21/2016 Metastatic breast cancer ([address] 6/1/2018 Microalbuminuria 9/2/2009 Osteoporosis 9/2/2009 Other and unspecified hyperlipidemia 1/27/2010 PAD (peripheral artery disease) ([address] 9/7/2011 Primary osteoarthritis of both knees 9/28/2016 Rheumatoid arthritis involving ankle ([address] 9/28/2016 Rheumatoid arthritis(714.0) 9/2/2009 Seropositive rheumatoid arthritis of multiple sites ([facility] 9/28/2016 Statin intolerance 12/21/2016 Type 2 diabetes mellitus with neurologic complication, with long-term current use of insulin ([facility] 2009 Type 2 diabetes with nephropathy ([facility] 2018 Unspecified essential hypertension 2009 Vitamin D deficiency 2017 Weakness due to cerebrovascular accident Chief Complaint for this Admission: Chief Complaint Patient presents with Cough Other Prior to Admission Medications:  
Prior to Admission Medications Prescriptions Last Dose Informant Taking? OMEGA-3 FATTY ACIDS/FISH OIL (OMEGA 3 FISH OIL PO) 2020  Yes Sig: Take  by mouth daily. aspirin delayed-release 81 mg tablet 2020  Yes Sig: Take 81 mg by mouth daily. carvediloL (Coreg) 6.25 mg tablet 2020  Yes Sig: Take 6.25 mg by mouth two (2) times daily (with meals). cholecalciferol (Vitamin D3) 25 mcg (1,000 unit) cap 2020  Yes Sig: Take 1,000 Units by mouth daily. clopidogreL (PLAVIX) 75 mg tab 2020  Yes Sig: TAKE ONE TABLET BY MOUTH DAILY  
evolocumab (Repatha SureClick) pen injection 3/98/2377  Yes Si mg by SubCUTAneous route every fourteen (14) days. furosemide (LASIX) 40 mg tablet 2020  Yes Sig: Take 1 Tab by mouth two (2) times a day. glucosam/wesley-msm1/C/bassam/bosw (OSTEO BI-FLEX TRIPLE STRENGTH PO) 2020  Yes Sig: Take 2 Tabs by mouth daily. insulin aspart U-100 (NOVOLOG FLEXPEN U-100 INSULIN) 100 unit/mL (3 mL) inpn 2020  Yes Sig: by SubCUTAneous route Before breakfast, lunch, dinner and at bedtime. 2-3 units depending on blood sugar before meals. (Note: son states very sensitive.)  
insulin degludec (TRESIBA FLEXTOUCH U-100) 100 unit/mL (3 mL) inpn 2020  Yes Si Units by SubCUTAneous route daily. levothyroxine (SYNTHROID) 88 mcg tablet 2020  Yes Sig: Take 88 mcg by mouth Daily (before breakfast). lisinopriL (PRINIVIL, ZESTRIL) 5 mg tablet 2020  Yes Sig: Take 1 Tab by mouth daily. multivitamin (ONE A DAY) tablet 2020  Yes Sig: Take 1 Tab by mouth daily. nitroglycerin (NITROSTAT) 0.4 mg SL tablet 2020  Yes Si Tab by SubLINGual route as needed for Chest Pain. Up to 3 doses. Patient taking differently: 1 Tab by SubLINGual route every five (5) minutes as needed for Chest Pain. Up to 3 doses. Facility-Administered Medications: None Please contact the main inpatient pharmacy with any questions or concerns at [phone] and we will direct you to the clinical pharmacist covering this patient's care while in-house.   
Evelia Grier, PHARMD

## 2020-08-17 NOTE — PROGRESS NOTES
Orders received, chart reviewed and patient is currently under investigation for COVID-19. In attempts to have only essential personnel enter the room and conserve PPE, we will confer with nursing and/or the referring provider to determine the most appropriate timing of our therapy intervention. Until this time, we will follow the patient peripherally to support nursing staff on an appropriate care plan. Please complete STAND and initiate diet if indicated in the interim. Thank you for your assistance. Tavo Gaxiola M.Ed, CCC-SLP Speech-Language Pathologist

## 2020-08-17 NOTE — ROUTINE PROCESS
1946 Code blue, CPR began 1948 pulse check no pulse continue CPR 
1948 Epi administered 1950 pulse check, pulse found 1951 anesthesia at bedside 1953 IO in [facility] 83 intubated 22 at the lip, 7O tube, Cr \" good view\" 2006 Dr. Priscilla Sonwden from ICU at bedside, patient extubated per his order 2007 Patient on [facility] Patient transferred to CCU

## 2020-08-17 NOTE — CONSULTS
[facility] 
 
ICU Consult Note Name: Tavo Hinson : 1935 MRN: [id number] Date: 2020 HPI and Assessment:  
 
Reason for consult: Hypotension 83 y/o F w hx of CHF, CAD, CKD, DM, GERD, breast cancer presents with SOB and lethargy after being discharged from the hospital less than 24 hours prior. Her previous admission was for acute hypoxic respiratory failure in the setting of volume overload and new reduction in her EF from 40% to 25%. She was placed on BiPAP at the time and diuresed. She was discharged on an increased dose of lasix from her prior. Per report from the family, she had a low grade temperature at home and her SpO2 on their home device measured anywhere from 82-88%. On arrival to the ED she was saturating 100% on room air. She had a chest CT obtained here  which showed b/l mild-mod pleural effusions and a mod sized circumferential pericardial effusion (known from previous TTE). Her hypotension and fever are worrisome for infectious etiology. She was also discharged from the hospital on an increased lasix dose (though 12-24 hours is little time for that have significant effect). She does have a pericardial effusion which appears moderate in size on CT - this has been present since at least the  when it was also described as small-moderate. While tamponade must be included in the differential, I think this is less likely. She has also had a bump in her Cr and potassium (despite additional lasix) which could be attributable to addition of lisinopril vs prerenal azotemia. No alkalosis at this time. Regardless I think appropriate first action would be a small albumin challenge, hold lisinopril and see if IVF has positive effect. If not, would consult cardiology for their thoughts on diagnostic R+L heart cath. Recommendations: - Admit to hospital medicine 
- 250cc albumin challenge 
- blood cx, UA and cx 
- resume daily weights - hold antihypertensives - Reengage Cardiology in am 
- Appreciate efforts to clarify [address] with patient and family Active Problem List:  
 
Problem List  Date Reviewed: 7/23/2020 Codes Class CHF (congestive heart failure) ([facility]) ICD-10-CM: I50.9 ICD-9-CM: 428.0 Bacteremia due to Gram-negative bacteria ICD-10-CM: R78.81 ICD-9-CM: 790.7, 041.85   
   
 UTI (urinary tract infection) ICD-10-CM: N39.0 ICD-9-CM: 599.0 Septic shock ([facility]) ICD-10-CM: A41.9, R65.21 ICD-9-CM: 038.9, 785.52, 995.92 Dizziness ICD-10-CM: M80 ICD-9-CM: 780.4 Type 2 diabetes with nephropathy ([facility]) ICD-10-CM: E11.21 
ICD-9-CM: 250.40, 583.81   
   
 CAD (coronary artery disease) ICD-10-CM: I25.10 ICD-9-CM: 414.00 Metastatic breast cancer ([facility] ICD-10-CM: H36.195 ICD-9-CM: 174.9 CKD (chronic kidney disease) stage 3, GFR 30-59 ml/min ([facility]) ICD-10-CM: N18.3 ICD-9-CM: 881. 3 Vitamin D deficiency ICD-10-CM: E55.9 ICD-9-CM: 268.9 Asymptomatic hyperuricemia ICD-10-CM: E79.0 ICD-9-CM: 790.6 Statin intolerance ICD-10-CM: Z78.9 ICD-9-CM: 995.27 Long-term use of immunosuppressant medication ICD-10-CM: Z79.899 ICD-9-CM: V58.69 Seropositive rheumatoid arthritis of multiple sites [facility]) ICD-10-CM: M05.79 ICD-9-CM: 714.0 Primary osteoarthritis of both knees ICD-10-CM: M17.0 ICD-9-CM: 715.16 Weakness due to cerebrovascular accident ICD-10-CM: [id number] ICD-9-CM: AXE2432 PAD (peripheral artery disease) ([facility]) ICD-10-CM: I73.9 ICD-9-CM: 443.9 Carotid bruit ICD-10-CM: R09.89 ICD-9-CM: 857. 9 Hyperlipidemia ICD-10-CM: E78.5 ICD-9-CM: 272.4 Type 2 diabetes mellitus with neurologic complication, with long-term current use of insulin (HCC) ICD-10-CM: E11.49, Z79.4 ICD-9-CM: 250.60, V58.67 Colon cancer [facility]) ICD-10-CM: C18.9 ICD-9-CM: 153.9 Age-related osteoporosis without current pathological fracture ICD-10-CM: M81.0 ICD-9-CM: 733.01   
   
 HTN, goal below 140/90 ICD-10-CM: I10 
ICD-9-CM: 401.9 Anemia ICD-10-CM: D64.9 ICD-9-CM: 285.9 Past Medical History:  
 
 has a past medical history of Acute on chronic systolic HF (heart failure) ([facility] (5/19/2018), Age-related osteoporosis without current pathological fracture (9/2/2009), Anemia (9/2/2009), Asymptomatic hyperuricemia (2/16/2017), Breast cancer ([facility] CAD (coronary artery disease) (6/21/2018), Carotid bruit (8/31/2011), CHF (congestive heart failure) ([facility] CKD (chronic kidney disease) stage 3, GFR 30-59 ml/min ([facility] (10/25/2017), Colon cancer ([facility] (9/2/2009), Colon cancer ([facility] (9/2/2009), COVID-19, DM (diabetes mellitus) ([facility] (9/2/2009), GERD (gastroesophageal reflux disease), H/O: CVA (9/2/2009), Heart attack ([facility] HTN, goal below 140/90 (9/2/2009), Hypothyroid (9/2/2009), Ill-defined condition, Long-term use of immunosuppressant medication (11/21/2016), Metastatic breast cancer ([facility] (6/1/2018), Microalbuminuria (9/2/2009), Osteoporosis (9/2/2009), Other and unspecified hyperlipidemia (1/27/2010), PAD (peripheral artery disease) ([facility] (9/7/2011), Primary osteoarthritis of both knees (9/28/2016), Rheumatoid arthritis involving ankle ([facility] (9/28/2016), Rheumatoid arthritis(714.0) (9/2/2009), Seropositive rheumatoid arthritis of multiple sites ([facility] (9/28/2016), Statin intolerance (12/21/2016), Type 2 diabetes mellitus with neurologic complication, with long-term current use of insulin ([facility] (9/2/2009), Type 2 diabetes with nephropathy ([facility] (8/20/2018), Unspecified essential hypertension (9/2/2009), Vitamin D deficiency (6/16/2017), and Weakness due to cerebrovascular accident. Past Surgical History:  
 
 has a past surgical history that includes hx colectomy; hx hysterectomy; and hx other surgical. 
 
Home Medications:  
 
Prior to Admission medications Medication Sig Start Date End Date Taking? Authorizing Provider  
furosemide (LASIX) 40 mg tablet Take 1 Tab by mouth two (2) times a day. 8/16/20   Brigid Hernandez M, DO  
lisinopriL (PRINIVIL, ZESTRIL) 5 mg tablet Take 1 Tab by mouth daily. 8/17/20   Melvin Penaloza M, DO  
carvediloL (Coreg) 6.25 mg tablet Take 6.25 mg by mouth two (2) times daily (with meals). Provider, Historical  
clopidogreL (PLAVIX) 75 mg tab TAKE ONE TABLET BY MOUTH DAILY 6/2/20   Adama Kathleen III, MD  
cholecalciferol (Vitamin D3) 25 mcg (1,000 unit) cap Take 1,000 Units by mouth daily. Provider, Historical  
glucosam/wesley-msm1/C/bassam/bosw (OSTEO BI-FLEX TRIPLE STRENGTH PO) Take 2 Tabs by mouth daily. Bebeto Aquino MD  
evolocumab (Repatha SureClick) pen injection 861 mg by SubCUTAneous route every fourteen (14) days. Provider, Historical  
insulin aspart U-100 (NOVOLOG FLEXPEN U-100 INSULIN) 100 unit/mL (3 mL) inpn by SubCUTAneous route Before breakfast, lunch, dinner and at bedtime. 2-3 units depending on blood sugar before meals. (Note: son states very sensitive.)    Provider, Historical  
multivitamin (ONE A DAY) tablet Take 1 Tab by mouth daily. Provider, Historical  
insulin degludec (TRESIBA FLEXTOUCH U-100) 100 unit/mL (3 mL) inpn 14 Units by SubCUTAneous route daily. Provider, Historical  
nitroglycerin (NITROSTAT) 0.4 mg SL tablet 1 Tab by SubLINGual route as needed for Chest Pain. Up to 3 doses. Patient taking differently: 1 Tab by SubLINGual route every five (5) minutes as needed for Chest Pain. Up to 3 doses. 6/6/18   Vito Aparicio MD  
levothyroxine (SYNTHROID) 88 mcg tablet Take 88 mcg by mouth Daily (before breakfast). Provider, Historical  
aspirin delayed-release 81 mg tablet Take 81 mg by mouth daily. Provider, Historical  
OMEGA-3 FATTY ACIDS/FISH OIL (OMEGA 3 FISH OIL PO) Take  by mouth daily. Provider, Historical  
 
 
Allergies/Social/Family History: Allergies Allergen Reactions  Statins-Hmg-Coa Reductase Inhibitors Other (comments) Intolerant to statins  Sulfa (Sulfonamide Antibiotics) Other (comments)  
  syncope Social History Tobacco Use  Smoking status: Never Smoker  Smokeless tobacco: Never Used Substance Use Topics  Alcohol use: No  
  
Family History Problem Relation Age of Onset  Diabetes Mother  Stroke Mother  Diabetes Sister  Other Sister   
     fell and hit her head -  of this  Diabetes Brother  Cancer Father   
     stomach  Diabetes Sister Review of Systems: A comprehensive review of systems was negative except for: Constitutional: positive for fevers and malaise Respiratory: positive for cough or wheezing Objective:  
Vital Signs: 
Visit Vitals BP 91/71 Pulse 92 Temp 98.8 °F (37.1 °C) Resp 16 SpO2 98% O2 Device: Room air Temp (24hrs), Av.1 °F (36.7 °C), Min:97.7 °F (36.5 °C), Max:98.8 °F (37.1 °C) Intake/Output:  
No intake or output data in the 24 hours ending 20 0401 Physical Exam: 
 
General:  Alert, cooperative, well noursished, well developed, appears stated age Eyes:  Sclera anicteric. Pupils equally round and reactive to light. Mouth/Throat: Mucous membranes normal, oral pharynx clear Neck: Supple Lungs:   Clear to auscultation anteriorly, good effort CV:  Sinus tach, distant sounds Abdomen:   Soft, non-tender. bowel sounds normal. non-distended Extremities: No cyanosis or edema Skin: Skin color, texture, turgor normal. no acute rash or lesions Lymph nodes: Cervical and supraclavicular normal  
Musculoskeletal: No swelling or deformity Lines/Devices:  Intact, no erythema, drainage or tenderness Psych: Alert and oriented, normal mood affect given the setting LABS AND  DATA: Personally reviewed Recent Labs 20 
2352 20 
0449 WBC 6.0 4.1 HGB 8.4* 7.5* HCT 28.1* 24.8*  
 183 Recent Labs 08/16/20 
2352 08/16/20 
0415 08/15/20 
0525 * 133* 133*  
K 5.0 4.5 3.3*  
CL 99 102 100 CO2 23 23 25 BUN 49* 45* 40* CREA 1.74* 1.20* 1.21* * 187* 170* CA 8.2* 8.3* 7.9*  
MG  --   --  2.0 PHOS  --   --  2.6 Recent Labs 08/16/20 
2352 * TP 7.3 ALB 2.3*  
GLOB 5.0* No results for input(s): INR, PTP, APTT, INREXT in the last 72 hours. No results for input(s): PHI, PCO2I, PO2I, FIO2I in the last 72 hours. Recent Labs 08/16/20 2352 TROIQ 1.79* MEDS: Reviewed Chest X-Ray: CXR Results  (Last 48 hours) 08/16/20 2358  XR CHEST PORT Final result Impression:  IMPRESSION: Improved edema. Bilateral lower lobe airspace disease with  
effusions. Sclerotic metastases. Narrative:  EXAM: XR CHEST PORT INDICATION: sob, fever COMPARISON: August 12 FINDINGS: A portable AP radiograph of the chest was obtained at 2354 hours. The  
patient is on a cardiac monitor. There is bilateral lower lobe airspace disease  
and bilateral effusions. The pattern of pulmonary congestion has improved. There are sclerotic lesions in the bones as seen previously. ECHO: 
· LV: Calculated LVEF is 25%. Visually measured ejection fraction. Normal cavity size and wall thickness. Moderate-to-severely reduced systolic function. Wall motion: normal. 
· AV: Aortic valve leaflet calcification present with reduced excursion. Aortic valve mean gradient is 16 mmHg. Mild to moderate aortic valve stenosis is present. · MV: Mild mitral valve regurgitation is present. · Pericardium: Small-to-moderate circumferential pericardial effusion. there is no evidence of tamponade. DISPOSITION Non-ICU bed CRITICAL CARE CONSULTANT NOTE I had a face to face encounter with the patient, reviewed and interpreted patient data including clinical events, labs, images, vital signs, I/O's, and examined patient. I have discussed the case and the plan and management of the patient's care with the consulting services, the bedside nurses and the respiratory therapist.   
 
I personally spent 50 minutes of critical care time. This is time spent at this critically ill patient's bedside actively involved in patient care as well as the coordination of care and discussions with the patient's family. This does not include any procedural time which has been billed separately. Matt Woodson MD    
Critical Care Medicine [facility]

## 2020-08-17 NOTE — NURSE NAVIGATOR
Chart reviewed by Heart Failure Nurse Navigator. Heart Failure database completed. EF:  20/25% ACEi/ARB/ARNi: held d/t rise in creatinine BB: coreg 6.25 mg twice daily Aldosterone Antagonist: not currently prescribed Obstructive Sleep Apnea Screening: per previous admission note, son states pt uses CPAP 
 STOP-BANG score: 
 Referred to Sleep Medicine: CRT: not indicated (pt is DNR) NYHA Functional Class IV on admission. Heart Failure Teach Back in Patient Education. Heart Failure Avoiding Triggers on Discharge Instructions. Cardiologist: Dr. Fabián Story ([facility]) Post discharge follow up phone call to be made within 48-72 hours of discharge. READMISSION:  
Prior admission 8/12-8/16/20; final coding not yet completed. Patient did have scheduled f/u appointment for 8/19/20 @ 4. Patient returned to ED roughly 10 hours after discharge with c/o fever and cough, per patient's son. Currently awaiting COVID19 results.

## 2020-08-17 NOTE — PROGRESS NOTES
Bedside shift change report given to Diamond (oncoming nurse) by RN (offgoing nurse). Report included the following information SBAR, Kardex, MAR and Recent Results. TRANSFER - OUT REPORT: 
 
Verbal report given to RN(name) on Rachel Benitez  being transferred to [facility]) for routine progression of care Report consisted of patients Situation, Background, Assessment and  
Recommendations(SBAR). Information from the following report(s) SBAR, Kardex, [facility] and Recent Results was reviewed with the receiving nurse. Lines:  
Peripheral IV 08/17/20 Left Wrist (Active) Site Assessment Clean, dry, & intact 08/17/20 1200 Phlebitis Assessment 0 08/17/20 1200 Infiltration Assessment 0 08/17/20 1200 Dressing Status Clean, dry, & intact 08/17/20 1200 Dressing Type Tape;Transparent 08/17/20 1200 Hub Color/Line Status Blue;Capped 08/17/20 1200 Action Taken Open ports on tubing capped 08/17/20 0843 Alcohol Cap Used Yes 08/17/20 1285 Opportunity for questions and clarification was provided. Patient transported with: 
 Monitor Registered Nurse

## 2020-08-17 NOTE — ED TRIAGE NOTES
Patient arrives to the ED with c/o patient had a fever of 100.4F, patient took tylenol and rechecked temp which was 99.5F, per son patient also had a \"wet\" cough, none noted in triage, and 02 sats of 84% , sats of 97% noted in triage. No distress noted, patient was discharge today @ noon.

## 2020-08-17 NOTE — PROGRESS NOTES
Hospitalist Progress Note Hospital summary: Foster Conrad is a 82 y.o. female with PMH Systolic HF EF 27%, CAD, recent NSTEMI, past COVID 19 infection, DM type 2, lung cancer presents to the ED c/o shortness of breath and fever at home. History provided mostly by son who is at bedside. Son states that patient was discharged yesterday from [facility] after she was admitted for HF exacerbation. During that admission she was found to be hypoxic requiring bipap as well as to have NSTEMI. She was discharged home on lasix 40mg bid. At the time of discharge her breathing was back to baseline and she was doing well. Last night after dinner she started experiencing increased shortness of breath and malaise. His son took her temperature and it was 100.5 her O2 sats were in 80s for which she was brought back to the ED. At that time she took 2 tylenols. In the ED pt has been stable not requiring Oxygen and has been afebrile. Had CT of the chest that showed right pleural effusion and large pericardial effusion. Hospitalist consulted for admission. Patient denies cough, chest pain, chills, abdominal pain, nausea or diarrhea 8/16/2020 Assessment/Plan: 
Shortness of breath likely due to Acute on chronic systolic Congestive heart failure: 
- worsening probnp. -  Pt now feeling better and sating 100% RA.   
- However, infection especially given recent admission and reported fever at home should be considered - COVID test pending . Pt has history of covid infection in July 2020. 
- normal procalcitonin  
- CT chest: Bilateral pleural effusions right greater than left with underlying atelectasis 
- recent Echo EF 25% 
- cardiology on board - IV lasix one dose given - c/w coreg, aspirin, plavix  
- may need thoracentesis  
  
 Elevated troponin suspected secondary to NSTEMI: troponin trending down.  
-History of coronary artery disease 
-no intervention per cardiology -Continue Plavix and beta-blocker Pericardial effusion: 
 -Large by CT chest but small to moderate on echo 8/14/20. - per cardiology UTI?  
- UA positive for mamie estrace 
- reported fever at home, hx multiple UTIs in the past 
- f/u urine cx 
- started on IV ceftriaxone  
  
Hypertension:  
- blood pressure in the low 86N systolic - at baseline - c/w coreg. lisinopril on hold due to JEROD JEROD: creatinine of 1.76. 
-  could be related to hypotension vs cardiorenal. 
- lisinopril on hold 
- monitor renal function PFO: noted on echo 4/20/20 with Mod Left to right shunt Diabetes -Insulin sliding scale coverage. A1c 8.8 Hypothyroidism -Continue Synthroid Coronary artery disease, triple vessel, non CABG candidate  
Metastatic breast cancer with liver lesions: has appointment with new oncologist at [place] [id number] pending. Recent XRT Palliative care consulted Code status: DNR. Stepan Singh [phone] DVT prophylaxis: SCDs Disposition: TBD 
---------------------------------------------- 
 
CC: SOB 
 
S: Patient is seen and examined at bedside. She feels better, sob improved. No complaints this morning. Spoke with son Ciara Enrique on phone and updated patients status- long discussion - no more fevers, concern for UTI. bilateral pleural effusions - may need tap Review of Systems: A comprehensive review of systems was negative. O: 
Visit Vitals /82 (BP 1 Location: Left arm, BP Patient Position: At rest) Pulse (!) 121 Temp 98.9 °F (37.2 °C) Resp 18 SpO2 97% PHYSICAL EXAM: 
Gen: NAD, elderly HEENT: anicteric sclerae, normal conjunctiva, oropharynx clear, MM moist 
Neck: supple, trachea midline, no adenopathy Heart: RRR, no MRG, no JVD, no peripheral edema Lungs: decreased breath sounds at bases, mild crackles Abd: soft, NT, ND, BS+, no organomegaly Extr: warm Skin: dry, no rash Neuro: CN II-XII grossly intact, normal speech, moves all extremities Psych: normal mood, appropriate affect No intake or output data in the 24 hours ending 08/17/20 1544 Recent labs & imaging reviewed: 
Recent Results (from the past 24 hour(s)) CULTURE, BLOOD, PAIRED Collection Time: 08/16/20 11:51 PM  
 Specimen: Blood Result Value Ref Range Special Requests: NO SPECIAL REQUESTS Culture result: NO GROWTH AFTER 5 HOURS    
LACTIC ACID Collection Time: 08/16/20 11:51 PM  
Result Value Ref Range Lactic acid 1.3 0.4 - 2.0 MMOL/L  
CBC WITH AUTOMATED DIFF Collection Time: 08/16/20 11:52 PM  
Result Value Ref Range WBC 6.0 3.6 - 11.0 K/uL  
 RBC 3.35 (L) 3.80 - 5.20 M/uL HGB 8.4 (L) 11.5 - 16.0 g/dL HCT 28.1 (L) 35.0 - 47.0 % MCV 83.9 80.0 - 99.0 FL  
 MCH 25.1 (L) 26.0 - 34.0 PG  
 MCHC 29.9 (L) 30.0 - 36.5 g/dL  
 RDW 19.4 (H) 11.5 - 14.5 % PLATELET 676 079 - 799 K/uL MPV 11.1 8.9 - 12.9 FL  
 NRBC 0.3 (H) 0  WBC ABSOLUTE NRBC 0.02 (H) 0.00 - 0.01 K/uL NEUTROPHILS 65 32 - 75 % LYMPHOCYTES 8 (L) 12 - 49 % MONOCYTES 23 (H) 5 - 13 % EOSINOPHILS 1 0 - 7 % BASOPHILS 1 0 - 1 % IMMATURE GRANULOCYTES 2 (H) 0.0 - 0.5 % ABS. NEUTROPHILS 3.8 1.8 - 8.0 K/UL  
 ABS. LYMPHOCYTES 0.5 (L) 0.8 - 3.5 K/UL  
 ABS. MONOCYTES 1.4 (H) 0.0 - 1.0 K/UL  
 ABS. EOSINOPHILS 0.1 0.0 - 0.4 K/UL  
 ABS. BASOPHILS 0.1 0.0 - 0.1 K/UL  
 ABS. IMM. GRANS. 0.1 (H) 0.00 - 0.04 K/UL  
 DF SMEAR SCANNED    
 RBC COMMENTS ANISOCYTOSIS 1+ 
    
 RBC COMMENTS OVALOCYTES PRESENT 
    
 RBC COMMENTS MACHO CELLS 
PRESENT 
    
METABOLIC PANEL, COMPREHENSIVE Collection Time: 08/16/20 11:52 PM  
Result Value Ref Range Sodium 131 (L) 136 - 145 mmol/L Potassium 5.0 3.5 - 5.1 mmol/L Chloride 99 97 - 108 mmol/L  
 CO2 23 21 - 32 mmol/L Anion gap 9 5 - 15 mmol/L Glucose 313 (H) 65 - 100 mg/dL BUN 49 (H) 6 - 20 MG/DL Creatinine 1.74 (H) 0.55 - 1.02 MG/DL  
 BUN/Creatinine ratio 28 (H) 12 - 20 GFR est AA 34 (L) >60 ml/min/1.73m2 GFR est non-AA 28 (L) >60 ml/min/1.73m2 Calcium 8.2 (L) 8.5 - 10.1 MG/DL Bilirubin, total 0.4 0.2 - 1.0 MG/DL  
 ALT (SGPT) 30 12 - 78 U/L  
 AST (SGOT) 91 (H) 15 - 37 U/L Alk. phosphatase 412 (H) 45 - 117 U/L Protein, total 7.3 6.4 - 8.2 g/dL Albumin 2.3 (L) 3.5 - 5.0 g/dL Globulin 5.0 (H) 2.0 - 4.0 g/dL A-G Ratio 0.5 (L) 1.1 - 2.2    
TROPONIN I Collection Time: 08/16/20 11:52 PM  
Result Value Ref Range Troponin-I, Qt. 1.79 (H) <0.05 ng/mL NT-PRO BNP Collection Time: 08/16/20 11:52 PM  
Result Value Ref Range NT pro-BNP 6,052 (H) <450 PG/ML  
SAMPLES BEING HELD Collection Time: 08/16/20 11:52 PM  
Result Value Ref Range SAMPLES BEING HELD 1blue,1red COMMENT Add-on orders for these samples will be processed based on acceptable specimen integrity and analyte stability, which may vary by analyte. GLUCOSE, POC Collection Time: 08/16/20 11:57 PM  
Result Value Ref Range Glucose (POC) 336 (H) 65 - 100 mg/dL Performed by Deshaun aTvarez EKG, 12 LEAD, INITIAL Collection Time: 08/17/20 12:00 AM  
Result Value Ref Range Ventricular Rate 92 BPM  
 Atrial Rate 92 BPM  
 P-R Interval 152 ms QRS Duration 84 ms Q-T Interval 382 ms QTC Calculation (Bezet) 472 ms Calculated P Axis 48 degrees Calculated R Axis 32 degrees Calculated T Axis 138 degrees Diagnosis Normal sinus rhythm Septal infarct (cited on or before 25-DEC-2019) ST & T wave abnormality, consider lateral ischemia When compared with ECG of 12-AUG-2020 20:03, 
T wave inversion less evident in Lateral leads Confirmed by Cherise Roberts M.D., Roxy Clamp ([id number]) on 8/17/2020 7:16:20 AM 
  
PROCALCITONIN Collection Time: 08/17/20  6:25 AM  
Result Value Ref Range Procalcitonin 0.37 ng/mL SARS-COV-2 Collection Time: 08/17/20  6:26 AM  
Result Value Ref Range Specimen source Nasopharyngeal    
 SARS-CoV-2 PENDING  Specimen source Nasopharyngeal    
 Specimen type NP Swab Health status Symptomatic Testing GLUCOSE, POC Collection Time: 08/17/20  7:29 AM  
Result Value Ref Range Glucose (POC) 274 (H) 65 - 100 mg/dL Performed by Jasmyn Dennison GLUCOSE, POC Collection Time: 08/17/20 11:48 AM  
Result Value Ref Range Glucose (POC) 248 (H) 65 - 100 mg/dL Performed by Leslye Peck Recent Labs 08/16/20 2352 WBC 6.0 HGB 8.4* HCT 28.1*  
 Recent Labs 08/16/20 
2352 08/16/20 
0415 08/15/20 
8170 * 133* 133*  
K 5.0 4.5 3.3*  
CL 99 102 100 CO2 23 23 25 BUN 49* 45* 40* CREA 1.74* 1.20* 1.21* * 187* 170* CA 8.2* 8.3* 7.9*  
MG  --   --  2.0 PHOS  --   --  2.6 Recent Labs 08/16/20 2352 ALT 30 * TBILI 0.4 TP 7.3 ALB 2.3*  
GLOB 5.0* No results for input(s): INR, PTP, APTT, INREXT in the last 72 hours. No results for input(s): FE, TIBC, PSAT, FERR in the last 72 hours. No results found for: FOL, RBCF No results for input(s): PH, PCO2, PO2 in the last 72 hours. Recent Labs 08/16/20 2352 TROIQ 1.79* Lab Results Component Value Date/Time Cholesterol, total 74 04/16/2018 04:21 AM  
 HDL Cholesterol 25 04/16/2018 04:21 AM  
 LDL, calculated 31.6 04/16/2018 04:21 AM  
 Triglyceride 87 04/16/2018 04:21 AM  
 CHOL/HDL Ratio 3.0 04/16/2018 04:21 AM  
 
Lab Results Component Value Date/Time Glucose (POC) 248 (H) 08/17/2020 11:48 AM  
 Glucose (POC) 274 (H) 08/17/2020 07:29 AM  
 Glucose (POC) 336 (H) 08/16/2020 11:57 PM  
 Glucose (POC) 348 (H) 08/16/2020 11:15 AM  
 Glucose (POC) 197 (H) 08/16/2020 07:39 AM  
 
Lab Results Component Value Date/Time  Color YELLOW/STRAW 07/16/2020 03:02 PM  
 Appearance CLEAR 07/16/2020 03:02 PM  
 Specific gravity 1.007 07/16/2020 03:02 PM  
 pH (UA) 6.5 07/16/2020 03:02 PM  
 Protein TRACE (A) 07/16/2020 03:02 PM  
 Glucose Negative 07/16/2020 03:02 PM  
 Ketone Negative 07/16/2020 03:02 PM  
 Bilirubin Negative 07/16/2020 03:02 PM  
 Urobilinogen 0.2 07/16/2020 03:02 PM  
 Nitrites Negative 07/16/2020 03:02 PM  
 Leukocyte Esterase MODERATE (A) 07/16/2020 03:02 PM  
 Epithelial cells FEW 07/16/2020 03:02 PM  
 Bacteria Negative 07/16/2020 03:02 PM  
 WBC 20-50 07/16/2020 03:02 PM  
 RBC 0-5 07/16/2020 03:02 PM  
 
 
Med list reviewed Current Facility-Administered Medications Medication Dose Route Frequency  sodium chloride (NS) flush 5-40 mL  5-40 mL IntraVENous Q8H  
 sodium chloride (NS) flush 5-40 mL  5-40 mL IntraVENous PRN  
 acetaminophen (TYLENOL) tablet 650 mg  650 mg Oral Q6H PRN Or  
 acetaminophen (TYLENOL) suppository 650 mg  650 mg Rectal Q6H PRN  polyethylene glycol (MIRALAX) packet 17 g  17 g Oral DAILY PRN  
 ondansetron (ZOFRAN) injection 4 mg  4 mg IntraVENous Q6H PRN  
 glucose chewable tablet 16 g  4 Tab Oral PRN  
 glucagon (GLUCAGEN) injection 1 mg  1 mg IntraMUSCular PRN  
 dextrose 10% infusion 0-250 mL  0-250 mL IntraVENous PRN  
 insulin lispro (HUMALOG) injection   SubCUTAneous AC&HS  aspirin delayed-release tablet 81 mg  81 mg Oral DAILY  clopidogreL (PLAVIX) tablet 75 mg  75 mg Oral DAILY  carvediloL (COREG) tablet 6.25 mg  6.25 mg Oral BID  levothyroxine (SYNTHROID) tablet 88 mcg  88 mcg Oral ACB  [Held by provider] lisinopriL (PRINIVIL, ZESTRIL) tablet 5 mg  5 mg Oral DAILY Current Outpatient Medications Medication Sig  furosemide (LASIX) 40 mg tablet Take 1 Tab by mouth two (2) times a day.  lisinopriL (PRINIVIL, ZESTRIL) 5 mg tablet Take 1 Tab by mouth daily.  carvediloL (Coreg) 6.25 mg tablet Take 6.25 mg by mouth two (2) times daily (with meals).  clopidogreL (PLAVIX) 75 mg tab TAKE ONE TABLET BY MOUTH DAILY  cholecalciferol (Vitamin D3) 25 mcg (1,000 unit) cap Take 1,000 Units by mouth daily.  glucosam/wesley-msm1/C/bassam/bosw (OSTEO BI-FLEX TRIPLE STRENGTH PO) Take 2 Tabs by mouth daily.  evolocumab (Repatha SureClick) pen injection 219 mg by SubCUTAneous route every fourteen (14) days.  insulin aspart U-100 (NOVOLOG FLEXPEN U-100 INSULIN) 100 unit/mL (3 mL) inpn by SubCUTAneous route Before breakfast, lunch, dinner and at bedtime. 2-3 units depending on blood sugar before meals. (Note: son states very sensitive.)  multivitamin (ONE A DAY) tablet Take 1 Tab by mouth daily.  insulin degludec (TRESIBA FLEXTOUCH U-100) 100 unit/mL (3 mL) inpn 14 Units by SubCUTAneous route daily.  nitroglycerin (NITROSTAT) 0.4 mg SL tablet 1 Tab by SubLINGual route as needed for Chest Pain. Up to 3 doses. (Patient taking differently: 1 Tab by SubLINGual route every five (5) minutes as needed for Chest Pain. Up to 3 doses.)  levothyroxine (SYNTHROID) 88 mcg tablet Take 88 mcg by mouth Daily (before breakfast).  aspirin delayed-release 81 mg tablet Take 81 mg by mouth daily.  OMEGA-3 FATTY ACIDS/FISH OIL (OMEGA 3 FISH OIL PO) Take  by mouth daily. Care Plan discussed with:  Patient/Family and Nurse Glen Gregory MD 
Internal Medicine Date of Service: 8/17/2020

## 2020-08-17 NOTE — H&P
[address] [facility]  Hospitalist Group History and Physical 
 
Primary Care Provider: Ines Elliott MD 
Date of Service:  8/17/2020 Subjective: Abbe Willis is a 82 y.o. female with PMH Systolic HF EF 43%, CAD, recent NSTEMI, past COVID 19 infection, DM type 2, lung cancer presents to the ED c/o shortness of breath and fever at home. History provided mostly by son who is at bedside. Son states that patient was discharged yesterday from [facility] after she was admitted for HF exacerbation. During that admission she was found to be hypoxic requiring bipap as well as to have NSTEMI. She was discharged home on lasix 40mg bid. At the time of discharge her breathing was back to baseline and she was doing well. Last night after dinner she started experiencing increased shortness of breath and malaise. His son took her temperature and it was 100.5 her O2 sats were in 80s for which she was brought back to the ED. At that time she took 2 tylenols. In the ED pt has been stable not requiring Oxygen and has been afebrile. Had CT of the chest that showed right pleural effusion and large pericardial effusion. Hospitalist consulted for admission. Patient denies cough, chest pain, chills, abdominal pain, nausea or diarrhea. Review of Systems: 
 
Review of Systems Constitutional: Positive for fever and malaise/fatigue. Negative for chills and weight loss. HENT: Negative for congestion, ear discharge and hearing loss. Eyes: Negative for blurred vision and double vision. Respiratory: Positive for shortness of breath. Negative for cough, sputum production and wheezing. Cardiovascular: Positive for orthopnea. Negative for chest pain, palpitations, leg swelling and PND. Gastrointestinal: Negative for abdominal pain, constipation, diarrhea, melena, nausea and vomiting. Genitourinary: Negative for dysuria, frequency and urgency. Musculoskeletal: Negative for back pain, joint pain and myalgias. Skin: Negative for itching and rash. Neurological: Negative for dizziness, sensory change, speech change, focal weakness, weakness and headaches. Endo/Heme/Allergies: Negative for polydipsia. Does not bruise/bleed easily. Past Medical History:  
Diagnosis Date  Acute on chronic systolic HF (heart failure) ([address] 5/19/2018  Age-related osteoporosis without current pathological fracture 9/2/2009  Anemia 9/2/2009  Asymptomatic hyperuricemia 2/16/2017  Breast cancer ([address]  CAD (coronary artery disease) 6/21/2018  Carotid bruit 8/31/2011  CHF (congestive heart failure) ([address]  CKD (chronic kidney disease) stage 3, GFR 30-59 ml/min ([facility]) 10/25/2017  Colon cancer ([address] 9/2/2009  
 surgery/chemo  Colon cancer ([facility] 9/2/2009  COVID-19   
 DM (diabetes mellitus) ([address] 9/2/2009  GERD (gastroesophageal reflux disease)  H/O: CVA 9/2/2009  
 slight l sided weakness  Heart attack ([address]  HTN, goal below 140/90 9/2/2009  Hypothyroid 9/2/2009  Ill-defined condition   
 seasonal allergies  Long-term use of immunosuppressant medication 11/21/2016  Metastatic breast cancer ([address] 6/1/2018  Microalbuminuria 9/2/2009  Osteoporosis 9/2/2009  Other and unspecified hyperlipidemia 1/27/2010  PAD (peripheral artery disease) ([address] 9/7/2011  Primary osteoarthritis of both knees 9/28/2016  Rheumatoid arthritis involving ankle ([address] 9/28/2016  Rheumatoid arthritis(714.0) 9/2/2009  Seropositive rheumatoid arthritis of multiple sites ([address] 9/28/2016  Statin intolerance 12/21/2016  Type 2 diabetes mellitus with neurologic complication, with long-term current use of insulin ([address] 9/2/2009  Type 2 diabetes with nephropathy ([address] 8/20/2018  Unspecified essential hypertension 9/2/2009  Vitamin D deficiency 6/16/2017  Weakness due to cerebrovascular accident Past Surgical History:  
Procedure Laterality Date  HX COLECTOMY  HX HYSTERECTOMY  HX OTHER SURGICAL    
 colonoscopies numerous since 1993 Prior to Admission medications Medication Sig Start Date End Date Taking? Authorizing Provider  
furosemide (LASIX) 40 mg tablet Take 1 Tab by mouth two (2) times a day. 8/16/20   Brigid Salazar M, DO  
lisinopriL (PRINIVIL, ZESTRIL) 5 mg tablet Take 1 Tab by mouth daily. 8/17/20   Dread Bertrand M, DO  
carvediloL (Coreg) 6.25 mg tablet Take 6.25 mg by mouth two (2) times daily (with meals). Provider, Historical  
clopidogreL (PLAVIX) 75 mg tab TAKE ONE TABLET BY MOUTH DAILY 6/2/20   Bradley Gayle III, MD  
cholecalciferol (Vitamin D3) 25 mcg (1,000 unit) cap Take 1,000 Units by mouth daily. Provider, Historical  
glucosam/wesley-msm1/C/bassam/bosw (OSTEO BI-FLEX TRIPLE STRENGTH PO) Take 2 Tabs by mouth daily. Sury Zarco MD  
evolocumab (Repatha SureClick) pen injection 322 mg by SubCUTAneous route every fourteen (14) days. Provider, Historical  
insulin aspart U-100 (NOVOLOG FLEXPEN U-100 INSULIN) 100 unit/mL (3 mL) inpn by SubCUTAneous route Before breakfast, lunch, dinner and at bedtime. 2-3 units depending on blood sugar before meals. (Note: son states very sensitive.)    Provider, Historical  
multivitamin (ONE A DAY) tablet Take 1 Tab by mouth daily. Provider, Historical  
insulin degludec (TRESIBA FLEXTOUCH U-100) 100 unit/mL (3 mL) inpn 14 Units by SubCUTAneous route daily. Provider, Historical  
nitroglycerin (NITROSTAT) 0.4 mg SL tablet 1 Tab by SubLINGual route as needed for Chest Pain. Up to 3 doses. Patient taking differently: 1 Tab by SubLINGual route every five (5) minutes as needed for Chest Pain. Up to 3 doses. 6/6/18   Dean Browning MD  
levothyroxine (SYNTHROID) 88 mcg tablet Take 88 mcg by mouth Daily (before breakfast). Provider, Historical  
aspirin delayed-release 81 mg tablet Take 81 mg by mouth daily.     Provider, Historical  
 OMEGA-3 FATTY ACIDS/FISH OIL (OMEGA 3 FISH OIL PO) Take  by mouth daily. Provider, Historical  
 
Allergies Allergen Reactions  Statins-Hmg-Coa Reductase Inhibitors Other (comments) Intolerant to statins  Sulfa (Sulfonamide Antibiotics) Other (comments)  
  syncope Family History Problem Relation Age of Onset  Diabetes Mother  Stroke Mother  Diabetes Sister  Other Sister   
     fell and hit her head -  of this  Diabetes Brother  Cancer Father   
     stomach  Diabetes Sister SOCIAL HISTORY: 
Patient resides at home Patient ambulates with assistance Smoking history: none Alcohol history: none Objective:  
 
 
Physical Exam:  
Patient Vitals for the past 12 hrs: 
 Temp Pulse Resp BP SpO2  
20 0200  92  91/71 98 % 20 2345    101/46 98 % 20 2336    91/45   
20 2235 98.8 °F (37.1 °C) 95 16 92/58 97 % GEN APPEARANCE: Patient resting in bed in NAD HEENT: Conjunctiva Clear CVS: RRR, S1, S2; No M/G/R 
LUNGS: Diminished breath sound on the right lung and crackles on the left field. No use of accessory muscles. ABD: Soft; No TTP; + Normoactive BS 
EXT: no edema Skin exam: No gross lesions noted on exposed skin surfaces MENTAL STATUS: Answers questions appropriately, responds to commands. Neuro: No gross motor or sensory deficits Data Review:  
Recent Results (from the past 24 hour(s)) GLUCOSE, POC Collection Time: 20  7:39 AM  
Result Value Ref Range Glucose (POC) 197 (H) 65 - 100 mg/dL Performed by [facility] GLUCOSE, POC Collection Time: 20 11:15 AM  
Result Value Ref Range Glucose (POC) 348 (H) 65 - 100 mg/dL Performed by [facility] LACTIC ACID Collection Time: 20 11:51 PM  
Result Value Ref Range Lactic acid 1.3 0.4 - 2.0 MMOL/L  
CBC WITH AUTOMATED DIFF Collection Time: 20 11:52 PM  
Result Value Ref Range WBC 6.0 3.6 - 11.0 K/uL RBC 3.35 (L) 3.80 - 5.20 M/uL HGB 8.4 (L) 11.5 - 16.0 g/dL HCT 28.1 (L) 35.0 - 47.0 % MCV 83.9 80.0 - 99.0 FL  
 MCH 25.1 (L) 26.0 - 34.0 PG  
 MCHC 29.9 (L) 30.0 - 36.5 g/dL  
 RDW 19.4 (H) 11.5 - 14.5 % PLATELET 199 560 - 540 K/uL MPV 11.1 8.9 - 12.9 FL  
 NRBC 0.3 (H) 0  WBC ABSOLUTE NRBC 0.02 (H) 0.00 - 0.01 K/uL NEUTROPHILS 65 32 - 75 % LYMPHOCYTES 8 (L) 12 - 49 % MONOCYTES 23 (H) 5 - 13 % EOSINOPHILS 1 0 - 7 % BASOPHILS 1 0 - 1 % IMMATURE GRANULOCYTES 2 (H) 0.0 - 0.5 % ABS. NEUTROPHILS 3.8 1.8 - 8.0 K/UL  
 ABS. LYMPHOCYTES 0.5 (L) 0.8 - 3.5 K/UL  
 ABS. MONOCYTES 1.4 (H) 0.0 - 1.0 K/UL  
 ABS. EOSINOPHILS 0.1 0.0 - 0.4 K/UL  
 ABS. BASOPHILS 0.1 0.0 - 0.1 K/UL  
 ABS. IMM. GRANS. 0.1 (H) 0.00 - 0.04 K/UL  
 DF SMEAR SCANNED    
 RBC COMMENTS ANISOCYTOSIS 1+ 
    
 RBC COMMENTS OVALOCYTES PRESENT 
    
 RBC COMMENTS MACHO CELLS 
PRESENT 
    
METABOLIC PANEL, COMPREHENSIVE Collection Time: 08/16/20 11:52 PM  
Result Value Ref Range Sodium 131 (L) 136 - 145 mmol/L Potassium 5.0 3.5 - 5.1 mmol/L Chloride 99 97 - 108 mmol/L  
 CO2 23 21 - 32 mmol/L Anion gap 9 5 - 15 mmol/L Glucose 313 (H) 65 - 100 mg/dL BUN 49 (H) 6 - 20 MG/DL Creatinine 1.74 (H) 0.55 - 1.02 MG/DL  
 BUN/Creatinine ratio 28 (H) 12 - 20 GFR est AA 34 (L) >60 ml/min/1.73m2 GFR est non-AA 28 (L) >60 ml/min/1.73m2 Calcium 8.2 (L) 8.5 - 10.1 MG/DL Bilirubin, total 0.4 0.2 - 1.0 MG/DL  
 ALT (SGPT) 30 12 - 78 U/L  
 AST (SGOT) 91 (H) 15 - 37 U/L Alk. phosphatase 412 (H) 45 - 117 U/L Protein, total 7.3 6.4 - 8.2 g/dL Albumin 2.3 (L) 3.5 - 5.0 g/dL Globulin 5.0 (H) 2.0 - 4.0 g/dL A-G Ratio 0.5 (L) 1.1 - 2.2    
TROPONIN I Collection Time: 08/16/20 11:52 PM  
Result Value Ref Range Troponin-I, Qt. 1.79 (H) <0.05 ng/mL NT-PRO BNP Collection Time: 08/16/20 11:52 PM  
Result Value Ref Range  NT pro-BNP 6,052 (H) <450 PG/ML  
SAMPLES BEING HELD  
 Collection Time: 08/16/20 11:52 PM  
Result Value Ref Range SAMPLES BEING HELD 1blue,1red COMMENT Add-on orders for these samples will be processed based on acceptable specimen integrity and analyte stability, which may vary by analyte. GLUCOSE, POC Collection Time: 08/16/20 11:57 PM  
Result Value Ref Range Glucose (POC) 336 (H) 65 - 100 mg/dL Performed by [facility] EKG, 12 LEAD, INITIAL Collection Time: 08/17/20 12:00 AM  
Result Value Ref Range Ventricular Rate 92 BPM  
 Atrial Rate 92 BPM  
 P-R Interval 152 ms QRS Duration 84 ms Q-T Interval 382 ms QTC Calculation (Bezet) 472 ms Calculated P Axis 48 degrees Calculated R Axis 32 degrees Calculated T Axis 138 degrees Diagnosis Normal sinus rhythm Septal infarct (cited on or before 25-DEC-2019) ST & T wave abnormality, consider lateral ischemia When compared with ECG of 12-AUG-2020 20:03, 
T wave inversion less evident in Lateral leads Assessment:  
 
Active Problems: 
  Heart failure ([address] (8/17/2020) Plan: 1. Shortness of breath could be related to worsening Acute on chronic systolic Congestive heart failure: worsening probnp. Pt now feeling better and sating 100% RA. However, infection especially given recent admission and reported fever at home should be considered - COVID test ordered. Pt has history of covid infection in July 2020. 
- Will try lasix once bp improved. -Acute on chronic congestive heart failure, echo August 2020 showed EF 25% 
-hold BB and ACE-I due to low blood pressure. - Cardiology consult.  
  
2. Elevated troponin suspected secondary to NSTEMI: troponin trending down.  
-History of coronary artery disease 
-no intervention per cardiology 
-Continue Plavix and beta-blocker and medically treat her  
  
3. Hypertension: blood pressure in the low 96T systolic.  
-hold Coreg and ace inh. 
- Will add Albumin now. 4. JEROD: creatinine of 1.76. could be related to hypotension vs cardiorenal. 
- Albumin 25% now 
- monitor I&O 5. Diabetes -Insulin sliding scale coverage 
-Ha1c 8.8  
  
6. Hypothyroidism 
-Continue Synthroid 
 
 7. Coronary artery disease, triple vessel, non CABG candidate  
 
8. Metastatic breast cancer: has appointment with new oncologist at [facility] pending. DVT prophy: hep Code status: DNR/DNI. Code status discussed with son who is at bedside and stated no cpr or intubation. Plan discussed with: patient/son FUNCTIONAL STATUS PRIOR TO HOSPITALIZATION Ambulatory with Use of Assistive Devices Signed By: Rodgers Curling, MD   
 August 17, 2020

## 2020-08-17 NOTE — CONSULTS
Cardiology Consult Note Patient Name: Celestina Otero  : 1935 MRN: [id number] Date: 2020  Time: 11:16 AM 
 
Admit Diagnosis: Heart failure ([facility] [I50.9] Primary Cardiologist: Haley Camacho MD    Consulting Cardiologist: Brian Hernandez. Merlin Mantis, M.D. 
Ezekiel Kramer for Consult: CHF Requesting MD: Dr. Shaquille Lay Md 
 
HPI: 
Celestina Otero is a 82 y.o. female admitted on 2020  for Heart failure ([facility] [I50.9].   has a past medical history of Acute on chronic systolic HF (heart failure) ([address] (2018), Age-related osteoporosis without current pathological fracture (2009), Anemia (2009), Asymptomatic hyperuricemia (2017), Breast cancer ([facility] CAD (coronary artery disease) (2018), Carotid bruit (2011), CHF (congestive heart failure) ([facility] CKD (chronic kidney disease) stage 3, GFR 30-59 ml/min ([facility] (10/25/2017), Colon cancer ([facility] (2009), Colon cancer ([facility] (2009), COVID-19, DM (diabetes mellitus) ([facility] (2009), GERD (gastroesophageal reflux disease), H/O: CVA (2009), Heart attack ([address] HTN, goal below 140/90 (2009), Hypothyroid (2009), Ill-defined condition, Long-term use of immunosuppressant medication (2016), Metastatic breast cancer ([address] (2018), Microalbuminuria (2009), Osteoporosis (2009), Other and unspecified hyperlipidemia (2010), PAD (peripheral artery disease) ([address] (2011), Primary osteoarthritis of both knees (2016), Rheumatoid arthritis involving ankle ([facility] (2016), Rheumatoid arthritis(714.0) (2009), Seropositive rheumatoid arthritis of multiple sites ([facility] (2016), Statin intolerance (2016), Type 2 diabetes mellitus with neurologic complication, with long-term current use of insulin ([facility] (2009), Type 2 diabetes with nephropathy ([facility] (2018), Unspecified essential hypertension (9/2/2009), Vitamin D deficiency (6/16/2017), and Weakness due to cerebrovascular accident. Bacteremia 4/2020. Was COVID + in June 2020 but tested negative in July 2020 She has severe diffuse multivessel disease by [address] [place] [address] 2018 and not felt to be a candidate for CABG or PCI. Was discharged on 8/16/20 after hospitalization for CHF exacerbation. Echocardiogram last week showed further depressed EF to 25% (from 40-45%) in 4/20/20. She was treated with IV diuretics which was eventually changed to increased po dosing of Lasix 40 mg po BID and low dose lisinopril added. She also had elevated troponin last admission to peak of 4.35 with medical management only. She was discharged yesterday afternoon and son brought her back to ER yesterday evening due to c/o fever of 100.4 and \"wet cough\", SOB and concern for pulmonary edema  Pt reports she has some SOB at baseline. Has some intermittent chest discomfort but none since discharge from hospital yesterday. Labs indicate rising pro BNP with rise in creatinine to 1.7 (was 1.2-1.3 last admission). Troponin elevated but downward trend. CT scan with mild edema, large pericardial effusion. Pt has received no diuretics since admission, BP running low but did receive albumin IV. Covid testing pending. Subjective:  Currently denies chest pain, dizziness. Reports conversational SOB. She denies any history of fevers. No chills. O2 sats on RA are 97%. Assessment and Plan 1. SOB: could be related to CHF exacerbation pleural effusions. Covid testing also has been ordered. 2. Acute on chronic systolic CHF: EF 08% (8/03/31) -NYHA class IV 
 -ProBNP trending up albeit in setting of worsening creatinine. -CXR with improved pulmonary edema but worsened bilateral pleural effusions -Give lasix 40 mg IV x 1 
 -continue Coreg.  
 -Hold lisinopril given rise in creatinine. 3. Bilateral pleural effusions: R>L 
 -lasix as above -defer to primary team if tap warranted. 4. Elevated troponin:   
 -Troponin 1.79 but trending down from peak of 4.35 last week.  
 -no current chest pain. -EKG NSR with ST/T wave abnormality, T wave changes improved from last week. Thus does not indicate new NSTEMI. 5. CAD:  
     - Severe diffuse coronary disease by cath 2018 not felt to be a candidate for CABG or PCI 
            - Continue ASA, Plavix and Coreg 6. Pericardial effusion: 
 -Large by CT chest but small to moderate on echo 8/14/20. 
 -Dr. Dennis Mendez to review 7. Aortic stenosis: mild- moderate by TTE 8/14/20 -newly reported on echo last week. Dr. Dennis Mendez to review images. 8. Mild-mod MR/TR/mod pulmonary HTN 9. PFO: noted on echo 4/20/20 with Mod Left to right shunt. Other comorbids. 10. JEROD on ckd:  
Lab Results Component Value Date/Time Creatinine 1.74 (H) 08/16/2020 11:52 PM  
 
11. Chronic anemia: hgb  8.4 (was 7.5 on 8/14/20) 12. Hx of HTN:  bp low now. 
 -continue coreg but hold lisinopril 13. Hx of COVID 23 
 -she was confirmed + for COVID in June of 2020, by swab but negative in July 2020 
 -Repeat covid test pending. 14. Metastatic breast cancer with liver lesion 
 -hx of Tumor erosion of left breast, causing bleeding  
 -Has been seen as 2nd opinion by Dr. Quirino Boyd at [facility] 
            - Noted in chart, Has scheduled appt at [facility] for possible excision 15. DM type II: A1C 8.8 16. Hypothyroidism 
 -synthroid. 16. Advanced directives:  DNR listed but CM note denotes son indicates pt wants ventilation and chest compressions. Palliative care note from 8/13/20 also confirms FULL code status desired. 82 y.o. female with PMH of severe CAD, systolic CHF, metastatic breast CA with liver lesion.  Was discharged Sunday after hospitalization for CHF exacerbation with further reduced EF by echo, and now re-presented to ER Sunday afternoon with SOB and fever reported by family of 100.4. SOB may be due to CHF exacerbation or pleural effusions. Renal function however is worsened on increased po lasix dosing and lisinopril. O2 sats are 97% on RA and while she c/o SOB with talking, does not appear SOB. We agree with holding lisinopril. Will trial 1 dose IV lasix. Dr. Dumont Last to review last weeks echo regarding AS and pericardial effusion. Assessment/Plan/Discussion:Cardiology Attending:  
 
Patient seen on the day of progress note and reviewed chart with   and agree with Advance Practice Provider (BK, NP,PA)  assessment and plans. Due to Covid rule out, examination discussed but not face to face yet pending Covid r/o Preethi Ford is a 82 y.o. female Complex hx, reviewed and appears to have refractory CAD and CHF Now with some fever and more of pleural effusion evident on CXR though air space dz seems less, give IV lasix 40 mg now and hold ACE 
following Edwige Green MD   
 
 
 
 Review of Symptoms: 
Constitutional: + fever per son's report, no chills HEET: No trauma. No visual changes. No new hearing loss. Neck: No adenopathy or swelling. Heart: +chest pain at times, none since discharge. Lungs: + conversational dyspnea, Mild cough Abdomen: No pain. No nausea of vomiting. No BRBPR or melena. Urology: No dysuria or hematuria. Ext: No edema. Skin: No acute rashes or ulcers. Endocrine: No heat or cold intolerance. Neuro: No transient neurologic deficit Previous treatment/evaluation includes echocardiogram and cardiac catheterization . Past Medical History:  
Diagnosis Date  Acute on chronic systolic HF (heart failure) ([address] 5/19/2018  Age-related osteoporosis without current pathological fracture 9/2/2009  Anemia 9/2/2009  Asymptomatic hyperuricemia 2/16/2017  Breast cancer [facility]  CAD (coronary artery disease) 6/21/2018  Carotid bruit 8/31/2011  [facility]) [facility]  CKD (chronic kidney disease) stage 3, GFR 30-59 ml/min ([facility]) 10/25/2017  Colon cancer ([facility] 9/2/2009  
 surgery/chemo  Colon cancer ([facility] 9/2/2009  COVID-19   
 DM (diabetes mellitus) ([facility] 9/2/2009  GERD (gastroesophageal reflux disease)  H/O: CVA 9/2/2009  
 slight l sided weakness  Heart attack ([address]  HTN, goal below 140/90 9/2/2009  Hypothyroid 9/2/2009  Ill-defined condition   
 seasonal allergies  Long-term use of immunosuppressant medication 11/21/2016  Metastatic breast cancer ([facility] 6/1/2018  Microalbuminuria 9/2/2009  Osteoporosis 9/2/2009  Other and unspecified hyperlipidemia 1/27/2010  PAD (peripheral artery disease) ([facility] 9/7/2011  Primary osteoarthritis of both knees 9/28/2016  Rheumatoid arthritis involving ankle ([facility] 9/28/2016  Rheumatoid arthritis(714.0) 9/2/2009  Seropositive rheumatoid arthritis of multiple sites ([facility] 9/28/2016  Statin intolerance 12/21/2016  Type 2 diabetes mellitus with neurologic complication, with long-term current use of insulin ([facility] 9/2/2009  Type 2 diabetes with nephropathy ([facility] 8/20/2018  Unspecified essential hypertension 9/2/2009  Vitamin D deficiency 6/16/2017  Weakness due to cerebrovascular accident Past Surgical History:  
Procedure Laterality Date  HX COLECTOMY  HX HYSTERECTOMY  HX OTHER SURGICAL    
 colonoscopies numerous since 1993 Current Facility-Administered Medications Medication Dose Route Frequency  sodium chloride (NS) flush 5-40 mL  5-40 mL IntraVENous Q8H  
 sodium chloride (NS) flush 5-40 mL  5-40 mL IntraVENous PRN  
 acetaminophen (TYLENOL) tablet 650 mg  650 mg Oral Q6H PRN Or  
 acetaminophen (TYLENOL) suppository 650 mg  650 mg Rectal Q6H PRN  polyethylene glycol (MIRALAX) packet 17 g  17 g Oral DAILY PRN  
 ondansetron (ZOFRAN) injection 4 mg  4 mg IntraVENous Q6H PRN  
  glucose chewable tablet 16 g  4 Tab Oral PRN  
 glucagon (GLUCAGEN) injection 1 mg  1 mg IntraMUSCular PRN  
 dextrose 10% infusion 0-250 mL  0-250 mL IntraVENous PRN  
 insulin lispro (HUMALOG) injection   SubCUTAneous AC&HS  aspirin delayed-release tablet 81 mg  81 mg Oral DAILY  clopidogreL (PLAVIX) tablet 75 mg  75 mg Oral DAILY  carvediloL (COREG) tablet 6.25 mg  6.25 mg Oral BID  levothyroxine (SYNTHROID) tablet 88 mcg  88 mcg Oral ACB  [Held by provider] lisinopriL (PRINIVIL, ZESTRIL) tablet 5 mg  5 mg Oral DAILY  furosemide (LASIX) injection 40 mg  40 mg IntraVENous ONCE Current Outpatient Medications Medication Sig  furosemide (LASIX) 40 mg tablet Take 1 Tab by mouth two (2) times a day.  lisinopriL (PRINIVIL, ZESTRIL) 5 mg tablet Take 1 Tab by mouth daily.  carvediloL (Coreg) 6.25 mg tablet Take 6.25 mg by mouth two (2) times daily (with meals).  clopidogreL (PLAVIX) 75 mg tab TAKE ONE TABLET BY MOUTH DAILY  cholecalciferol (Vitamin D3) 25 mcg (1,000 unit) cap Take 1,000 Units by mouth daily.  glucosam/wesley-msm1/C/bassam/bosw (OSTEO BI-FLEX TRIPLE STRENGTH PO) Take 2 Tabs by mouth daily.  evolocumab (Repatha SureClick) pen injection 186 mg by SubCUTAneous route every fourteen (14) days.  insulin aspart U-100 (NOVOLOG FLEXPEN U-100 INSULIN) 100 unit/mL (3 mL) inpn by SubCUTAneous route Before breakfast, lunch, dinner and at bedtime. 2-3 units depending on blood sugar before meals. (Note: son states very sensitive.)  multivitamin (ONE A DAY) tablet Take 1 Tab by mouth daily.  insulin degludec (TRESIBA FLEXTOUCH U-100) 100 unit/mL (3 mL) inpn 14 Units by SubCUTAneous route daily.  nitroglycerin (NITROSTAT) 0.4 mg SL tablet 1 Tab by SubLINGual route as needed for Chest Pain. Up to 3 doses. (Patient taking differently: 1 Tab by SubLINGual route every five (5) minutes as needed for Chest Pain. Up to 3 doses.)  levothyroxine (SYNTHROID) 88 mcg tablet Take 88 mcg by mouth Daily (before breakfast).  aspirin delayed-release 81 mg tablet Take 81 mg by mouth daily.  OMEGA-3 FATTY ACIDS/FISH OIL (OMEGA 3 FISH OIL PO) Take  by mouth daily. Allergies Allergen Reactions  Statins-Hmg-Coa Reductase Inhibitors Other (comments) Intolerant to statins  Sulfa (Sulfonamide Antibiotics) Other (comments)  
  syncope Family History Problem Relation Age of Onset  Diabetes Mother  Stroke Mother  Diabetes Sister  Other Sister   
     fell and hit her head -  of this  Diabetes Brother  Cancer Father   
     stomach  Diabetes Sister Social History Socioeconomic History  Marital status:  Spouse name: Not on file  Number of children: Not on file  Years of education: Not on file  Highest education level: Not on file Tobacco Use  Smoking status: Never Smoker  Smokeless tobacco: Never Used Substance and Sexual Activity  Alcohol use: No  
 Drug use: No  
 Sexual activity: Not Currently Partners: Male Objective: 
  Physical Exam 
 
Vitals:  
Vitals:  
 20 2347 20 0500 20 1412 20 2726 BP: 113/55 102/43 92/47 98/46 Pulse: 99 97 (!) 106 (!) 114 Resp:   16 16 Temp:   98.1 °F (36.7 °C) 98.2 °F (36.8 °C) SpO2: 100% 99% 97% 97% General:    Alert, cooperative, no distress, appears stated age. Neck:   Supple,. Back:     Symmetric,   
Lungs:     Diminished bilateral bases. Heart[de-identified]    Regular rate and rhythm, S1, S2 normal, grade I/VI systolic murmur Abdomen:     Soft, non-tender. Bowel sounds normal. No masses,  No   
  organomegaly. Extremities:   Extremities normal, atraumatic, no cyanosis or edema. Vascular:   Pulses - 2+ radial  
Skin:   Skin color normal. No rashes or lesions Neurologic:   [place], Normal strength throughout. Telemetry: sinus tachycardia ECG:  
EKG Results Procedure 720 Value Units Date/Time EKG, 12 LEAD, INITIAL [[id number]] Collected:  08/17/20 0000 Order Status:  Completed Updated:  08/17/20 8947 Ventricular Rate 92 BPM   
  Atrial Rate 92 BPM   
  P-R Interval 152 ms QRS Duration 84 ms Q-T Interval 382 ms QTC Calculation (Bezet) 472 ms Calculated P Axis 48 degrees Calculated R Axis 32 degrees Calculated T Axis 138 degrees Diagnosis --  
  Normal sinus rhythm Septal infarct (cited on or before 25-DEC-2019) ST & T wave abnormality, consider lateral ischemia When compared with ECG of 12-AUG-2020 20:03, 
T wave inversion less evident in Lateral leads Confirmed by Avelino Oconnor M.D., Felicitas Ellis ([id number]) on 8/17/2020 7:16:20 AM 
  
  
 
 
 
Data Review:  
 
Radiology: XR Results (most recent): 
[place] [facility] XR CHEST PORT Narrative EXAM: XR CHEST PORT INDICATION: sob, fever COMPARISON: August 12 FINDINGS: A portable AP radiograph of the chest was obtained at 2354 hours. The 
patient is on a cardiac monitor. There is bilateral lower lobe airspace disease 
and bilateral effusions. The pattern of pulmonary congestion has improved. There are sclerotic lesions in the bones as seen previously. Impression IMPRESSION: Improved edema. Bilateral lower lobe airspace disease with 
effusions. Sclerotic metastases. Recent Labs 08/16/20 
2352 TROIQ 1.79* Recent Labs 08/16/20 
2352 08/16/20 
0415 08/15/20 
7907 * 133* 133*  
K 5.0 4.5 3.3*  
CL 99 102 100 CO2 23 23 25 BUN 49* 45* 40* CREA 1.74* 1.20* 1.21* * 187* 170* PHOS  --   --  2.6 CA 8.2* 8.3* 7.9* Recent Labs 08/16/20 2352 WBC 6.0 HGB 8.4* HCT 28.1*  
 Recent Labs 08/16/20 
2352 * No results for input(s): CHOL, LDLC in the last 72 hours.  
 
No lab exists for component: TGL, HDLC,  HBA1C 
 No results for input(s): CRP, TSH, TSHEXT in the last 72 hours. No lab exists for component: ESR Thank you very much for this referral. I appreciate the opportunity to participate in this patient's care. I will follow along with above stated plan. Noah Batista. [facility] [facility], [address] [place], [place] 
   [phone] CC: Ines Elliott MD

## 2020-08-17 NOTE — ED NOTES
Spoke with Patient's son, Nae Lynch about [facility]'s visitor policy with Covid PUI patients. Mr. Nae Lynch stated that he understands but would like to receive a phone call when the test results come back. [phone]

## 2020-08-18 NOTE — PROCEDURES
Emergent Intubation Performed by: Lg Thomas MD 
Authorized by: gL Thomas MD  
 
Emergent Intubation:  
Date/Time:  8/17/2020 7:55 PM 
Indications:  Impending airway compromise Spontaneous Ventilation: present Preoxygenated: Yes Airway Documentation: Airway:  ETT - Cuffed Technique:  Intubating stylet Insertion Site:  Oral 
Blade Type:  Marsha Trevino Blade Size:  2 ETT size (mm):  7.0 ETT Line Obdulio:  Lips ETT Insertion depth (cm):  22 Placement verified by: auscultation, EtCO2 and BBS Attempts:  1 Difficult airway: No

## 2020-08-18 NOTE — PROGRESS NOTES
[address] [facility]  Hospitalist Group ICU Transfer/Accept Summary This patient is being transferred INTO THE ICU 
DATE OF TRANSFER: 8/17/2020 PATIENT ID: Dayna Hines MRN: [id number] YOB: 1935 PRIMARY CARE PROVIDER: Corrine Harrington MD  
DATE OF ADMISSION: 8/16/2020 11:24 PM   
ATTENDING PHYSICIAN: Jame Wilkes MD 
CONSULTATIONS:  
IP CONSULT TO CARDIOLOGY 
IP CONSULT TO PALLIATIVE CARE - PROVIDER PROCEDURES/SURGERIES:  
CPR  
 
REASON FOR ADMISSION:  
Acute on chronic systolic congestive heart failure HOSPITAL PROBLEM LIST: 
Patient Active Problem List  
Diagnosis Code  Type 2 diabetes mellitus with neurologic complication, with long-term current use of insulin ([facility]) E11.49, Z79.4  Colon cancer ([facility] C18.9  Age-related osteoporosis without current pathological fracture M81.0  
 HTN, goal below 140/90 I10  Anemia D64.9  Hyperlipidemia E78.5  Carotid bruit R09.89  
 PAD (peripheral artery disease) ([facility]) I73.9  Weakness due to cerebrovascular accident [id number]  Seropositive rheumatoid arthritis of multiple sites ([facility] M05.79  
 Primary osteoarthritis of both knees M17.0  Long-term use of immunosuppressant medication Z79.899  Statin intolerance Z78.9  Asymptomatic hyperuricemia E79.0  Vitamin D deficiency E55.9  CKD (chronic kidney disease) stage 3, GFR 30-59 ml/min ([facility]) N18.3  Metastatic breast cancer ([facility] C50.919  
 CAD (coronary artery disease) I25.10  Type 2 diabetes with nephropathy ([facility]) E11.21  
 Dizziness R42  Septic shock ([facility]) A41.9, R65.21  
 UTI (urinary tract infection) N39.0  Bacteremia due to Gram-negative bacteria R78.81  
 CHF (congestive heart failure) ([facility]) I50.9  Heart failure ([facility]) I50.9 Brief HPI and Hospital Course: Dayna Hines is a 82 y.o. female with PMH Systolic HF EF 89%, CAD, recent NSTEMI, past COVID 19 infection, DM type 2, lung cancer presents to the ED c/o shortness of breath and fever at home. History provided mostly by son who is at bedside. Son states that patient was discharged yesterday from [facility] after she was admitted for HF exacerbation. During that admission she was found to be hypoxic requiring bipap as well as to have NSTEMI. She was discharged home on lasix 40mg bid. At the time of discharge her breathing was back to baseline and she was doing well. Last night after dinner she started experiencing increased shortness of breath and malaise. His son took her temperature and it was 100.5 her O2 sats were in 80s for which she was brought back to the ED. At that time she took 2 tylenols. In the ED pt has been stable not requiring Oxygen and has been afebrile. Had CT of the chest that showed right pleural effusion and large pericardial effusion. Hospitalist consulted for admission. Suspected SOB likely due to Acute on chronic HF. In the ED she was found to have a low blood pressure with systolic in the 06J. She was given albumin x1 with improvement of her blood pressure. Patient evaluated by cardiology today and given 40mg lasix IV in addition to her home dose of coreg. Unknown her urine output today. On arrival to the [facility] floor patient was found to be hypotensive with blood pressure of 75/42. RRT was called patient started on IV fluids and Albumin while getting volume she continued to be hypotensive and became bradycardic and lost pulse. Patient' son was at bedside and reversed code status back to full code. CPR was done for 4 minutes and given one round of epi with ROSC. Intubated by anesthesia. Post cpr patient mental status improving but noted to be persistently hypotensive. Darrick ordered  And transferred to CCU for further management. Assessment and Plan: 1. Cardiac arrest: asystole in the setting of hypotension - Likely due to worsening cardiac function. 
- Pt previously noted to have pericardial effusion. ? Tamponade. - Cardiology contacted. - Transferred  
- Started on filomena for hypotension. 2.  Acute on chronic systolic Congestive heart failure: worsening probnp. worsening hypotension - Will need inotrop support. - diuresed today. Recommend to hold lasix. -hold BB and ACE-I due to low blood pressure. - Cardiology consulted.  
  
3. Elevated troponin suspected secondary to NSTEMI: troponin trending up now likely due to hypotension. History of coronary artery disease. 
-no intervention per cardiology 
-Continue Plavix. Hold beta blocker due to hypotensive.  
  
 4. JEROD: creatinine of 1.9: likely related to hypotension.  
- monitor I&O  
  
 
5. Diabetes -Insulin sliding scale coverage 
-Ha1c 8.8  
  
6. Hypothyroidism 
-Continue Synthroid 
  
 7. Coronary artery disease, triple vessel, non CABG candidate  
  
8. Metastatic breast cancer: has appointment with new oncologist at [facility] pending.  
  
DVT prophy: hep Code status: Full code. Patient son is at bedside and reversed code status to full code. Plan discussed with: Son and ICU attending. PHYSICAL EXAMINATION: 
Visit Vitals BP 96/53 Pulse (!) 107 Temp 98.4 °F (36.9 °C) Resp 29 SpO2 100% General:          Alert, oriented to person. Lungs:             decreased breath sound on right field. Course sounds on left. Heart:              tachycardic No  murmur   No edema Abdomen:       Soft, non-tender. Not distended. Bowel sounds normal 
Extremities:     No cyanosis. No clubbing,  +2 distal pulses Neurologic:      Alert, moves all extremities, oriented X 1. Recent Results (from the past 24 hour(s)) CULTURE, BLOOD, PAIRED Collection Time: 08/16/20 11:51 PM  
 Specimen: Blood Result Value Ref Range Special Requests: NO SPECIAL REQUESTS Culture result: NO GROWTH AFTER 5 HOURS    
LACTIC ACID Collection Time: 08/16/20 11:51 PM  
Result Value Ref Range  Lactic acid 1.3 0.4 - 2.0 MMOL/L  
CBC WITH AUTOMATED DIFF  
 Collection Time: 08/16/20 11:52 PM  
Result Value Ref Range WBC 6.0 3.6 - 11.0 K/uL  
 RBC 3.35 (L) 3.80 - 5.20 M/uL HGB 8.4 (L) 11.5 - 16.0 g/dL HCT 28.1 (L) 35.0 - 47.0 % MCV 83.9 80.0 - 99.0 FL  
 MCH 25.1 (L) 26.0 - 34.0 PG  
 MCHC 29.9 (L) 30.0 - 36.5 g/dL  
 RDW 19.4 (H) 11.5 - 14.5 % PLATELET 268 036 - 843 K/uL MPV 11.1 8.9 - 12.9 FL  
 NRBC 0.3 (H) 0  WBC ABSOLUTE NRBC 0.02 (H) 0.00 - 0.01 K/uL NEUTROPHILS 65 32 - 75 % LYMPHOCYTES 8 (L) 12 - 49 % MONOCYTES 23 (H) 5 - 13 % EOSINOPHILS 1 0 - 7 % BASOPHILS 1 0 - 1 % IMMATURE GRANULOCYTES 2 (H) 0.0 - 0.5 % ABS. NEUTROPHILS 3.8 1.8 - 8.0 K/UL  
 ABS. LYMPHOCYTES 0.5 (L) 0.8 - 3.5 K/UL  
 ABS. MONOCYTES 1.4 (H) 0.0 - 1.0 K/UL  
 ABS. EOSINOPHILS 0.1 0.0 - 0.4 K/UL  
 ABS. BASOPHILS 0.1 0.0 - 0.1 K/UL  
 ABS. IMM. GRANS. 0.1 (H) 0.00 - 0.04 K/UL  
 DF SMEAR SCANNED    
 RBC COMMENTS ANISOCYTOSIS 1+ 
    
 RBC COMMENTS OVALOCYTES PRESENT 
    
 RBC COMMENTS MACHO CELLS 
PRESENT 
    
METABOLIC PANEL, COMPREHENSIVE Collection Time: 08/16/20 11:52 PM  
Result Value Ref Range Sodium 131 (L) 136 - 145 mmol/L Potassium 5.0 3.5 - 5.1 mmol/L Chloride 99 97 - 108 mmol/L  
 CO2 23 21 - 32 mmol/L Anion gap 9 5 - 15 mmol/L Glucose 313 (H) 65 - 100 mg/dL BUN 49 (H) 6 - 20 MG/DL Creatinine 1.74 (H) 0.55 - 1.02 MG/DL  
 BUN/Creatinine ratio 28 (H) 12 - 20 GFR est AA 34 (L) >60 ml/min/1.73m2 GFR est non-AA 28 (L) >60 ml/min/1.73m2 Calcium 8.2 (L) 8.5 - 10.1 MG/DL Bilirubin, total 0.4 0.2 - 1.0 MG/DL  
 ALT (SGPT) 30 12 - 78 U/L  
 AST (SGOT) 91 (H) 15 - 37 U/L Alk. phosphatase 412 (H) 45 - 117 U/L Protein, total 7.3 6.4 - 8.2 g/dL Albumin 2.3 (L) 3.5 - 5.0 g/dL Globulin 5.0 (H) 2.0 - 4.0 g/dL A-G Ratio 0.5 (L) 1.1 - 2.2    
TROPONIN I Collection Time: 08/16/20 11:52 PM  
Result Value Ref Range Troponin-I, Qt. 1.79 (H) <0.05 ng/mL NT-PRO BNP  
 Collection Time: 08/16/20 11:52 PM  
Result Value Ref Range NT pro-BNP 6,052 (H) <450 PG/ML  
SAMPLES BEING HELD Collection Time: 08/16/20 11:52 PM  
Result Value Ref Range SAMPLES BEING HELD 1blue,1red COMMENT Add-on orders for these samples will be processed based on acceptable specimen integrity and analyte stability, which may vary by analyte. GLUCOSE, POC Collection Time: 08/16/20 11:57 PM  
Result Value Ref Range Glucose (POC) 336 (H) 65 - 100 mg/dL Performed by Rosi Flores EKG, 12 LEAD, INITIAL Collection Time: 08/17/20 12:00 AM  
Result Value Ref Range Ventricular Rate 92 BPM  
 Atrial Rate 92 BPM  
 P-R Interval 152 ms QRS Duration 84 ms Q-T Interval 382 ms QTC Calculation (Bezet) 472 ms Calculated P Axis 48 degrees Calculated R Axis 32 degrees Calculated T Axis 138 degrees Diagnosis Normal sinus rhythm Septal infarct (cited on or before 25-DEC-2019) ST & T wave abnormality, consider lateral ischemia When compared with ECG of 12-AUG-2020 20:03, 
T wave inversion less evident in Lateral leads Confirmed by Valerie Kathleen M.D., Michaela Deters ([id number]) on 8/17/2020 7:16:20 AM 
  
PROCALCITONIN Collection Time: 08/17/20  6:25 AM  
Result Value Ref Range Procalcitonin 0.37 ng/mL SARS-COV-2 Collection Time: 08/17/20  6:26 AM  
Result Value Ref Range Specimen source Nasopharyngeal    
 SARS-CoV-2 Not detected NOTD Specimen source Nasopharyngeal    
 Specimen type NP Swab Health status Symptomatic Testing GLUCOSE, POC Collection Time: 08/17/20  7:29 AM  
Result Value Ref Range Glucose (POC) 274 (H) 65 - 100 mg/dL Performed by Rosi Flores GLUCOSE, POC Collection Time: 08/17/20 11:48 AM  
Result Value Ref Range Glucose (POC) 248 (H) 65 - 100 mg/dL Performed by Aisha GOMEZ   
GLUCOSE, POC Collection Time: 08/17/20  5:02 PM  
Result Value Ref Range Glucose (POC) 199 (H) 65 - 100 mg/dL Performed by Arnie Maldonado GLUCOSE, POC Collection Time: 08/17/20  7:18 PM  
Result Value Ref Range Glucose (POC) 166 (H) 65 - 100 mg/dL Performed by Christel Chavez EKG, 12 LEAD, INITIAL Collection Time: 08/17/20  7:22 PM  
Result Value Ref Range Ventricular Rate 97 BPM  
 Atrial Rate 97 BPM  
 P-R Interval 144 ms QRS Duration 82 ms Q-T Interval 376 ms QTC Calculation (Bezet) 477 ms Calculated P Axis 54 degrees Calculated R Axis 29 degrees Calculated T Axis 144 degrees Diagnosis Normal sinus rhythm Septal infarct (cited on or before 25-DEC-2019) ST & T wave abnormality, consider anterolateral ischemia When compared with ECG of 17-AUG-2020 00:00, 
T wave inversion more evident in Anterolateral leads TROPONIN I Collection Time: 08/17/20  7:35 PM  
Result Value Ref Range Troponin-I, Qt. 2.31 (H) <0.05 ng/mL METABOLIC PANEL, BASIC Collection Time: 08/17/20  7:35 PM  
Result Value Ref Range Sodium 131 (L) 136 - 145 mmol/L Potassium 5.1 3.5 - 5.1 mmol/L Chloride 103 97 - 108 mmol/L  
 CO2 20 (L) 21 - 32 mmol/L Anion gap 8 5 - 15 mmol/L Glucose 161 (H) 65 - 100 mg/dL BUN 52 (H) 6 - 20 MG/DL Creatinine 1.90 (H) 0.55 - 1.02 MG/DL  
 BUN/Creatinine ratio 27 (H) 12 - 20 GFR est AA 31 (L) >60 ml/min/1.73m2 GFR est non-AA 25 (L) >60 ml/min/1.73m2 Calcium 7.9 (L) 8.5 - 10.1 MG/DL  
CBC WITH AUTOMATED DIFF Collection Time: 08/17/20  8:10 PM  
Result Value Ref Range WBC 6.6 3.6 - 11.0 K/uL  
 RBC 3.03 (L) 3.80 - 5.20 M/uL HGB 7.8 (L) 11.5 - 16.0 g/dL HCT 25.7 (L) 35.0 - 47.0 % MCV 84.8 80.0 - 99.0 FL  
 MCH 25.7 (L) 26.0 - 34.0 PG  
 MCHC 30.4 30.0 - 36.5 g/dL  
 RDW 19.6 (H) 11.5 - 14.5 % PLATELET 522 914 - 410 K/uL MPV 10.3 8.9 - 12.9 FL  
 NRBC 0.8 (H) 0  WBC ABSOLUTE NRBC 0.05 (H) 0.00 - 0.01 K/uL NEUTROPHILS 66 32 - 75 % LYMPHOCYTES 14 12 - 49 % MONOCYTES 12 5 - 13 % EOSINOPHILS 1 0 - 7 % BASOPHILS 1 0 - 1 % IMMATURE GRANULOCYTES 6 (H) 0.0 - 0.5 % ABS. NEUTROPHILS 4.3 1.8 - 8.0 K/UL  
 ABS. LYMPHOCYTES 0.9 0.8 - 3.5 K/UL  
 ABS. MONOCYTES 0.8 0.0 - 1.0 K/UL  
 ABS. EOSINOPHILS 0.1 0.0 - 0.4 K/UL  
 ABS. BASOPHILS 0.1 0.0 - 0.1 K/UL  
 ABS. IMM. GRANS. 0.4 (H) 0.00 - 0.04 K/UL  
 DF SMEAR SCANNED    
 RBC COMMENTS ANISOCYTOSIS 2+ SAMPLES BEING HELD Collection Time: 08/17/20  8:10 PM  
Result Value Ref Range SAMPLES BEING HELD 1PST COMMENT Add-on orders for these samples will be processed based on acceptable specimen integrity and analyte stability, which may vary by analyte. CODE STATUS: 
X Full Code DNR Partial  
 Comfort Care Critical Care Attestation: This patient is unstable and critically ill. Due to a high probability of clinically significant, life threatening deterioration, the patient required my highest level of preparedness to intervene emergently and I personally spent this critical care time directly and personally managing the patient. This critical care time included obtaining a history; examining the patient; pulse oximetry; ordering and review of studies; arranging urgent treatment with development of a management plan; evaluation of patient's response to treatment; frequent reassessment; and, discussions with other providers and/or family. This critical care time was performed to assess and manage the high probability of imminent, life-threatening deterioration that could result in multi-organ failure and death. It was exclusive of separately billable procedures, treating other patients, and teaching time.  
Time Spent:  
I personally spent 40 minutes in providing critical care time. 
  
 
 
 
Signed:  
Juaquin Dyer MD 
Date of Service:  8/17/2020 
8:33 PM

## 2020-08-18 NOTE — PROGRESS NOTES
Transitions of Care Plan: 
 RUR: 45% Readmit within 10hrs of discharge; CHF; pericardial effusion; breast ca w mets Baseline - home with paid caregivers; BONNIE Webb 23 for mobility; walker for ambulation Disposition - home with son and paid caregivers Patient readmitted with CHF exacerbation. Cardiology on board - EF 20-25%; CAD; History of metastatic breast cancer. Supportive son who will transport patient home once medically stable for discharge. CM will continue to follow.  
 
Vega Bolton, MPH

## 2020-08-18 NOTE — PROGRESS NOTES
Pharmacy Renal Dosing Protocol Medication: famotidine Current regimen:  20 mg every 12 hr 
Recent Labs 08/18/20 
0533 08/17/20 
2121 08/17/20 
1935 CREA 1.95* 1.83* 1.90* BUN 54* 51* 52* Estimated CrCl:  18 ml/min Plan: Change to 20 mg q24h for CrCl < 50 ml/min

## 2020-08-18 NOTE — PROGRESS NOTES
Contacted by critical care team. Pt with PEA arrest and coded. Transferred to ICU. Given pericardial effusion, questioned if there was any acute change. Echo probe check in ICU notes moderate pericardial effusion with no evidence of tamponade. Consider repeat full echo tomorrow. EKG with T wave inversion, no ST elevation. Reviewed previous cardiac history and pt has severe cardiomyopathy and diffuse CAD. Decompensation could certainly be worsening CHF vs sepsis. Recommend optimizing supportive care as is being done. May need to reconsider goals of care. Discussed with critical care team. Dr. Hillary Hopper saw pt earlier today and to follow.

## 2020-08-18 NOTE — PROGRESS NOTES
[facility] 
 
ICU TEAM Progress Note Name: Dayna Hines : 1935 MRN: [id number] Date: 2020 Assessment:  
 
ICU Problems: 1. PEA Arrest (17 Aug 2020 - CPR - ROSC) 2. Metastatic Breast Cancer (radiation treatment0 3. Septic Shock (from UTI prior to admission) 4. Anemia 5. STEMI 6. CAD 7. UTI (multiple Klebsiella UTIs in past) 8. Acute on Chronic CHF 9. DM2 10. Colon Cancer ICU Comprehensive Plan of Care:  
 
Plans for this Shift:  
1. Spoke with son and patient about goals of care - full code 2. Start Midodrine 10 mg TID 3. Continue NE & Phenylephrine 4. Discussed case with Cardiology (will review echo) 5. Continue Ceftriaxone 6. Trend Lactate/Procal 
7. Urine for culture 8. Continue Alford 9. SBP Goal of: > 100 mmHg 10. Phenylephrine and Norepinephrine - For above SBP/MAP goals 11. IVFs: Minimize IVF 12. Transfusion Trigger (Hgb): <8 g/dL 13. Respiratory Goals: 
a. Head of bed > 30 degrees 
b. Incentive spirometry 14. Pulmonary toilet: Duo-Nebs 15. SpO2 Goal: > 92% 16. Keep K>4; Mg>2 17. PT/OT: Consulted 18. Discussed Plan of Care/Code Status: Full Code 19. Appreciate Consultants Input: Cardiology 20. Discussed Care Plan with Bedside RN 
21. Documentation of Current Medications 22. Rest of Plan Below: 
 
F - Feeding:  Yes A - Analgesia: None S - Sedation: None T - DVT Prophylaxis: Plavix, SCD's or Sequential Compression Device and ASA 81 mg BID H - Head of Bed: > 30 Degrees U - Ulcer Prophylaxis: Not at this time G - Glycemic Control: Insulin S - Spontaneous Breathing Trial: N/A 
B - Bowel Regimen: None needed at this time I - Indwelling Catheter: 
 Tubes: None Lines: Peripheral IV and [employer] Drains: Alford Catheter D - De-escalation of Antibiotics:  
 
Subjective:  
Progress Note: 2020 Reason for ICU Admission: s/p Cardiac arrest  
 
HPI: 83 y/o F w hx of CHF, CAD, CKD, DM, GERD, breast cancer presents with SOB and lethargy after being discharged from the hospital less than 24 hours prior. Her previous admission was for acute hypoxic respiratory failure in the setting of volume overload and new reduction in her EF from 40% to 25%. She was placed on BiPAP at the time and diuresed. She was discharged on an increased dose of lasix from her prior.  
  
Per report from the family, she had a low grade temperature at home and her SpO2 on their home device measured anywhere from 82-88%. On arrival to the ED she was saturating 100% on room air. She had a chest CT obtained here 8/17 which showed b/l mild-mod pleural effusions and a mod sized circumferential pericardial effusion (known from previous TTE).   
I initially was consulted on Ms. Ledesma for hypotension earlier this am. In the interim, she was evaluated by cardiology - coreg continued and 1 dose lasix given. Unclear volume status as patient was in ED boarding all day. On arrival to the floor, a rapid response was called, followed shortly by code stroke and code blue. She has 1 round CPR w 1x epi, 1x bicarb, and was intubated. On my arrival to the room, patient was intubated and expressing that the ETT be removed. Her BP was 150/80 and O2 sat was 100, she was neuro intact and following complex commands. I extubated her at the bedside and she remained stable on 2 lpm NC. Following extubation her pressures dipped and I gave her 30 mcg epinephrine in divided doses while waiting for phenylephrine gtt to start. She remains confused on arrival to the ICU. Unclear what precipitated her deterioration this afternoon. With the known effusion and lasix dose given earlier, we elected to give a bolus of IVF. She was noted to be anemic and I ordered 1x PRBC.  Given her hx of extensive CAD, she certainly suffered myocardial hypoperfusion through this event and ECG on arrival to the ICU was concerning for STEMI and code stemi was called. Cardiology agreed that STAT TTE would be helpful in determining plan of care as they felt cath would unlikely be of any benefit. Will obtain stat TTE and if continued decline in systolic function will discuss additional therapy options with family and cardiology. Had extensive discussion with both of her sons about goals of care and expected trajectories following her cardiac arrest. She is to be a full code per her stated wishes from earlier during this hospitalization. Addendum: thick, purulent urine noted on placement of Alford. Patient has had numerous Klebsiella UTIs this year and she is already on ceftriaxone. Will continue current therapy and send UA/Cx. Active Problem List:  
 
Problem List  Date Reviewed: 7/23/2020 Codes Class Heart failure ([facility] ICD-10-CM: I50.9 ICD-9-CM: 428.9 CHF (congestive heart failure) ([facility]) ICD-10-CM: I50.9 ICD-9-CM: 428.0 Bacteremia due to Gram-negative bacteria ICD-10-CM: R78.81 ICD-9-CM: 790.7, 041.85   
   
 UTI (urinary tract infection) ICD-10-CM: N39.0 ICD-9-CM: 599.0 Septic shock ([facility]) ICD-10-CM: A41.9, R65.21 ICD-9-CM: 038.9, 785.52, 995.92 Dizziness ICD-10-CM: V47 ICD-9-CM: 780.4 Type 2 diabetes with nephropathy ([facility]) ICD-10-CM: E11.21 
ICD-9-CM: 250.40, 583.81   
   
 CAD (coronary artery disease) ICD-10-CM: I25.10 ICD-9-CM: 414.00 Metastatic breast cancer ([facility] ICD-10-CM: V02.252 ICD-9-CM: 174.9 CKD (chronic kidney disease) stage 3, GFR 30-59 ml/min ([facility]) ICD-10-CM: N18.3 ICD-9-CM: 012. 3 Vitamin D deficiency ICD-10-CM: E55.9 ICD-9-CM: 268.9 Asymptomatic hyperuricemia ICD-10-CM: E79.0 ICD-9-CM: 790.6 Statin intolerance ICD-10-CM: Z78.9 ICD-9-CM: 995.27 Long-term use of immunosuppressant medication ICD-10-CM: Z79.899 ICD-9-CM: V58.69  Seropositive rheumatoid arthritis of multiple sites [facility]) ICD-10-CM: M05.79 
 ICD-9-CM: 714.0 Primary osteoarthritis of both knees ICD-10-CM: M17.0 ICD-9-CM: 715.16 Weakness due to cerebrovascular accident ICD-10-CM: [id number] ICD-9-CM: CWS3530 PAD (peripheral artery disease) ([facility]) ICD-10-CM: I73.9 ICD-9-CM: 443.9 Carotid bruit ICD-10-CM: R09.89 ICD-9-CM: 106. 9 Hyperlipidemia ICD-10-CM: E78.5 ICD-9-CM: 272.4 Type 2 diabetes mellitus with neurologic complication, with long-term current use of insulin (HCC) ICD-10-CM: E11.49, Z79.4 ICD-9-CM: 250.60, V58.67 Colon cancer [facility]) ICD-10-CM: C18.9 ICD-9-CM: 153.9 Age-related osteoporosis without current pathological fracture ICD-10-CM: M81.0 ICD-9-CM: 733.01   
   
 HTN, goal below 140/90 ICD-10-CM: I10 
ICD-9-CM: 401.9 Anemia ICD-10-CM: D64.9 ICD-9-CM: 285.9 Past Medical History:  
 
 has a past medical history of Acute on chronic systolic HF (heart failure) ([facility] (5/19/2018), Age-related osteoporosis without current pathological fracture (9/2/2009), Anemia (9/2/2009), Asymptomatic hyperuricemia (2/16/2017), Breast cancer ([facility] CAD (coronary artery disease) (6/21/2018), Carotid bruit (8/31/2011), CHF (congestive heart failure) ([facility] CKD (chronic kidney disease) stage 3, GFR 30-59 ml/min ([facility] (10/25/2017), Colon cancer ([facility] (9/2/2009), Colon cancer ([facility] (9/2/2009), COVID-19, DM (diabetes mellitus) ([facility] (9/2/2009), GERD (gastroesophageal reflux disease), H/O: CVA (9/2/2009), Heart attack ([facility] HTN, goal below 140/90 (9/2/2009), Hypothyroid (9/2/2009), Ill-defined condition, Long-term use of immunosuppressant medication (11/21/2016), Metastatic breast cancer ([facility] (6/1/2018), Microalbuminuria (9/2/2009), Osteoporosis (9/2/2009), Other and unspecified hyperlipidemia (1/27/2010), PAD (peripheral artery disease) ([facility] (9/7/2011), Primary osteoarthritis of both knees (9/28/2016), Rheumatoid arthritis involving ankle ([facility] (9/28/2016), Rheumatoid arthritis(714.0) (9/2/2009), Seropositive rheumatoid arthritis of multiple sites ([facility] (9/28/2016), Statin intolerance (12/21/2016), Type 2 diabetes mellitus with neurologic complication, with long-term current use of insulin ([facility] (9/2/2009), Type 2 diabetes with nephropathy ([facility] (8/20/2018), Unspecified essential hypertension (9/2/2009), Vitamin D deficiency (6/16/2017), and Weakness due to cerebrovascular accident. Past Surgical History:  
 
 has a past surgical history that includes hx colectomy; hx hysterectomy; and hx other surgical. 
 
Home Medications:  
 
Prior to Admission medications Medication Sig Start Date End Date Taking? Authorizing Provider  
furosemide (LASIX) 40 mg tablet Take 1 Tab by mouth two (2) times a day. 8/16/20  Yes Brigid Kirby, DO  
lisinopriL (PRINIVIL, ZESTRIL) 5 mg tablet Take 1 Tab by mouth daily. 8/17/20  Yes Brigid Kirby, DO  
carvediloL (Coreg) 6.25 mg tablet Take 6.25 mg by mouth two (2) times daily (with meals). Yes Provider, Historical  
clopidogreL (PLAVIX) 75 mg tab TAKE ONE TABLET BY MOUTH DAILY 6/2/20  Yes Isabella Irene MD  
cholecalciferol (Vitamin D3) 25 mcg (1,000 unit) cap Take 1,000 Units by mouth daily. Yes Provider, Historical  
glucosam/wesley-msm1/C/bassam/bosw (OSTEO BI-FLEX TRIPLE STRENGTH PO) Take 2 Tabs by mouth daily. Yes David Grajeda MD  
evolocumab (Repatha SureClick) pen injection 662 mg by SubCUTAneous route every fourteen (14) days. Yes Provider, Historical  
insulin aspart U-100 (NOVOLOG FLEXPEN U-100 INSULIN) 100 unit/mL (3 mL) inpn by SubCUTAneous route Before breakfast, lunch, dinner and at bedtime. 2-3 units depending on blood sugar before meals. (Note: son states very sensitive.)   Yes Provider, Historical  
multivitamin (ONE A DAY) tablet Take 1 Tab by mouth daily.    Yes Provider, Historical  
insulin degludec (TRESIBA FLEXTOUCH U-100) 100 unit/mL (3 mL) inpn 14 Units by SubCUTAneous route daily. Yes Provider, Historical  
nitroglycerin (NITROSTAT) 0.4 mg SL tablet 1 Tab by SubLINGual route as needed for Chest Pain. Up to 3 doses. Patient taking differently: 1 Tab by SubLINGual route every five (5) minutes as needed for Chest Pain. Up to 3 doses. 18  Yes Sissy Cortez MD  
levothyroxine (SYNTHROID) 88 mcg tablet Take 88 mcg by mouth Daily (before breakfast). Yes Provider, Historical  
aspirin delayed-release 81 mg tablet Take 81 mg by mouth daily. Yes Provider, Historical  
OMEGA-3 FATTY ACIDS/FISH OIL (OMEGA 3 FISH OIL PO) Take  by mouth daily. Yes Provider, Historical  
 
 
Allergies/Social/Family History: Allergies Allergen Reactions  Statins-Hmg-Coa Reductase Inhibitors Other (comments) Intolerant to statins  Sulfa (Sulfonamide Antibiotics) Other (comments)  
  syncope Social History Tobacco Use  Smoking status: Never Smoker  Smokeless tobacco: Never Used Substance Use Topics  Alcohol use: No  
  
Family History Problem Relation Age of Onset  Diabetes Mother  Stroke Mother  Diabetes Sister  Other Sister   
     fell and hit her head -  of this  Diabetes Brother  Cancer Father   
     stomach  Diabetes Sister Review of Systems:  
 
Constitutional: positive for confusion, lethargy Eyes: negative Respiratory: negative Cardiovascular: negative Gastrointestinal: negative Genitourinary:negative Integument/breast: negative Hematologic/lymphatic: negative Musculoskeletal:negative Neurological: positive for memory problems and confusion Behavioral/Psych: negative Objective:  
Vital Signs: 
Visit Vitals /44 Pulse 92 Temp 98 °F (36.7 °C) Resp 23 Ht 5' 1\" (1.549 m) Wt 62.1 kg (136 lb 14.5 oz) SpO2 97% BMI 25.87 kg/m²  O2 Flow Rate (L/min): 2 l/min O2 Device: Nasal cannula Temp (24hrs), Av.1 °F (36.7 °C), Min:97.8 °F (36.6 °C), Max:98.9 °F (37.2 °C) CVP (mmHg): 9 mmHg (20 1100) Intake/Output:  
 
Intake/Output Summary (Last 24 hours) at 2020 1129 Last data filed at 2020 1000 Gross per 24 hour Intake 1005.1 ml Output 685 ml Net 320.1 ml Physical Exam: 
 
General:  Alert, cooperative, well noursished, well developed, appears stated age Eyes:  Sclera anicteric. Pupils equally round and reactive to light. Mouth/Throat: Mucous membranes normal, oral pharynx clear Neck: Supple Lungs:   Clear to auscultation bilaterally, good effort CV:  Regular rate and rhythm,no murmur, click, rub or gallop Abdomen:   Soft, non-tender. bowel sounds normal. non-distended Extremities: No cyanosis or edema Skin: Skin color, texture, turgor normal. no acute rash or lesions Lymph nodes: Cervical and supraclavicular normal  
Musculoskeletal: No swelling or deformity Lines/Devices:  Intact, no erythema, drainage or tenderness Psych: Oriented to self only LABS AND  DATA: Personally reviewed Recent Labs 20 
0520 WBC 9.4 8.5 HGB 9.3* 7.3* HCT 30.6* 24.0*  
 180 Recent Labs 20 
7869 2033 20 NA  --  134* 135* K  --  5.3* 4.7  
CL  --  105 105 CO2  --  * 20* BUN  --  54* 51* CREA  --  1.95* 1.83* GLU  --  231* 186* CA  --  7.4* 7.4* MG 2.0  --  2.0 PHOS  --   --  2.9 Recent Labs 20 
2352 * 412* TP 6.4 7.3 ALB 2.3* 2.3*  
GLOB 4.1* 5.0* Recent Labs 20 INR 1.3* PTP 12.8* No results for input(s): PHI, PCO2I, PO2I, FIO2I in the last 72 hours. Recent Labs 20 
0533 20 TROIQ 2.78* 2.28* MEDS: Reviewed Chest X-Ray: CXR Results  (Last 48 hours) 20  XR CHEST PORT Final result  Impression:  IMPRESSION:  
 1. No complicating features post line placement. 2. Enlarged cardiac silhouette. Pericardial effusion on previous CT 3. Right greater than left pleural effusions 4. Diffuse bony metastatic disease Narrative:  INDICATION: Line placement EXAM: Portable chest 2104 hours . Comparison 8/17/2020. FINDINGS: A right neck central venous catheter has been placed. Tip overlies the  
superior vena cava. Cardiac silhouette remains enlarged. Right pleural effusion  
is again noted with basilar atelectasis. No pneumothorax. Diffuse bony  
metastatic disease. 08/16/20 2358  XR CHEST PORT Final result Impression:  IMPRESSION: Improved edema. Bilateral lower lobe airspace disease with  
effusions. Sclerotic metastases. Narrative:  EXAM: XR CHEST PORT INDICATION: sob, fever COMPARISON: August 12 FINDINGS: A portable AP radiograph of the chest was obtained at 2354 hours. The  
patient is on a cardiac monitor. There is bilateral lower lobe airspace disease  
and bilateral effusions. The pattern of pulmonary congestion has improved. There are sclerotic lesions in the bones as seen previously. ECHO (8/17/2020): 
Result status: Final result · LV: Estimated LVEF is 20 - 25% with severe global, anteroapical hypokinesis. · AV: Mild aortic valve stenosis is present. Aortic valve mean gradient is 12 mmHg. · MV: Mild to moderate mitral valve regurgitation is present. · Pericardium: Small circumferential pericardial effusion. No indications of tamponade present. Multidisciplinary Rounds Completed: Yes ABCDEF Bundle/Checklist Completed: 
Yes SPECIAL EQUIPMENT None DISPOSITION Stay in ICU CRITICAL CARE CONSULTANT NOTE I had a face to face encounter with the patient, reviewed and interpreted patient data including clinical events, labs, images, vital signs, I/O's, and examined patient.   I have discussed the case and the plan and management of the patient's care with the consulting services, the bedside nurses and the respiratory therapist.   
 
NOTE OF PERSONAL INVOLVEMENT IN CARE This patient has a high probability of imminent, clinically significant deterioration, which requires the highest level of preparedness to intervene urgently. I participated in the decision-making and personally managed or directed the management of the following life and organ supporting interventions that required my frequent assessment to treat or prevent imminent deterioration. I personally spent 110 minutes of critical care time. This is time spent at this critically ill patient's bedside actively involved in patient care as well as the coordination of care and discussions with the patient's family. This does not include any procedural time which has been billed separately. Mine Yoon Critical Care Medicine [facility]

## 2020-08-18 NOTE — PROGRESS NOTES
Upon receiving patient, she was found to be hypotensive. Rapid response called. Mr. Tesha Prajapati came to bedside. Orders received: blood work, midodrine, 250 ml bolus, and albumin. Patient alert and oriented 1-2 (person and place). Son at bedside and stated that there was a change in her mental status- she did not recognize him when she saw him. Before giving midodrine, patient presented with garbled speech and unable to follow commands. Code stroke called and patient lost pulse at 19:46. Patient previously DNR but status changed by son when pulse lost. See [facility] note for code. Bedside report given to CCU nurse.

## 2020-08-18 NOTE — PROGRESS NOTES
Spiritual Care Assessment/Progress Note [address] 
 
 
NAME: Preethi Ford      MRN: [id number] AGE: 82 y.o. SEX: female Yazidism Affiliation: [id number] Language: [place] 8/17/2020     Total Time (in minutes): 57 Spiritual Assessment begun in [facility] 4 IMCU through conversation with: 
  
    []Patient        [x] Family    [] Friend(s) Reason for Consult: Code Blue/99 Spiritual beliefs: (Please include comment if needed) [x] Identifies with a uzma tradition:     
   [] Supported by a uzma community:        
   [] Claims no spiritual orientation:       
   [] Seeking spiritual identity:            
   [] Adheres to an individual form of spirituality:       
   [] Not able to assess:                   
 
    
Identified resources for coping:  
   [x] Prayer                           
   [] Music                  [] Guided Imagery [x] Family/friends                 [] Pet visits [] Devotional reading                         [] Unknown 
   [] Other:                                         
 
 
Interventions offered during this visit: (See comments for more details) Patient Interventions: Initial/Spiritual assessment, patient floor, Initial visit Family/Friend(s): Affirmation of emotions/emotional suffering, Affirmation of uzma, Catharsis/review of pertinent events in supportive environment, Guidance concerning next steps/process to be expected, Initial Assessment, Normalization of emotional/spiritual concerns, Prayer (assurance of) Plan of Care: 
 
 [x] Support spiritual and/or cultural needs  
 [] Support AMD and/or advance care planning process    
 [] Support grieving process 
 [] Coordinate Rites and/or Rituals  
 [] Coordination with community clergy [] No spiritual needs identified at this time 
 [] Detailed Plan of Care below (See Comments)  [] Make referral to Music Therapy 
[] Make referral to Pet Therapy    
[] Make referral to Addiction services [] Make referral to [facility] 
[] Make referral to Spiritual Care Partner 
[] No future visits requested       
[x] Follow up visits as needed Comments:  responded to page of a Code Blue in patients room. When  arrived the medical team was tending to the patient. One of the patients sons was in the room, but  could not give to him at this time.  waited until he could get to the son to enter the room. The patient was revived, but staff was still tending to her. The son gave a medical history of the patient. She has been declining for about 2 years according to the son. Ms. Manuel Costa lives with her son. She has a second son who is on the way to the hospital to be with her. The son said they lost his father about 2 1/2 years ago. The ICU doctor came to the room and then took the son into the hallway to discuss the patients condition. They had a talk about the code status of the patient. The son says he wants the same type of care that was given to her tonight. The other son arrived during this conversation. The doctor then gave an update on Ms. Ledesma's condition. Patient is being transferred to [address] [place] [address].  escorted the 2 sons to the CCU waiting room as the medical was going to be a procedure on the patient after they moved her. The sons were upset and the one in the room shed a few tears during the code and visiting with the .  provided pastoral care, support, active listening and words of comfort to the sons.  support is available as needed. Rev. Ronan Camagro [facility] Staff  [facility] Staff  [address] [facility] [id number] W: [phone]/ U:[phone] [address] [place] [address] Drive 
[email or web address]

## 2020-08-18 NOTE — ROUTINE PROCESS
0730 Bedside shift change report given to Cat RN (oncoming nurse) by Camila Varela RN (offgoing nurse). Report included the following information Korin Vaca MD at bedside. No orders received. [place] [address] at [facility]. Changes goal BP to  SBP  >100. Midodrine added. 1000 Pt up to chair assist x 2 
 
1130 Pt back to bed 
 
1343 Darrick off 
 
1500 EKG obtained and labs drawn 
 
1600 Pt up to chair for 1 hour. Assist x 2 
 
1930 Bedside and Verbal shift change report given to [address] (oncoming nurse) by Cat RN (offgoing nurse). Report included the following information SBAR.

## 2020-08-18 NOTE — PROGRESS NOTES
made a brief follow up visit to patients room in CCU.  was with this family last night and wanted to follow up. The oldest son was in the room and the patient was looking and feeling better. The son said they have determined that it was all caused by an UTI.  provided pastoral care and support during this visit. Spiritual Care is available for follow up visits as needed. Rev. Miguel A Noonan [facility] EMERGENCY [facility] Staff [facility]plain [facility] Staff  [address] [place] [address] [id number] W: [phone]/ I:[phone] [address] 
[email or web address]

## 2020-08-18 NOTE — PROCEDURES
[facility] Procedure Note - Central Venous Access:  
Performed by ADRIANO Conteh Obtained emergently post cardiac arrest for vasopressors. Immediately prior to the procedure, the patient was reevaluated and found suitable for the planned procedure and any planned medications. Immediately prior to the procedure a time out was called to verify the correct patient, procedure, equipment, staff, and marking as appropriate. Patient positioned in Trendelenburg. Central line Bundle: 
Full sterile barrier precautions used. 7-Step Sterility Protocol followed. (cap, mask sterile gown, sterile gloves, large sterile sheet, hand hygiene, 2% chlorhexidine for cutaneous antisepsis) 5 mL 1% Lidocaine placed at insertion site. The site was prepped with ChloraPrep and Sterile draping. Using Seldinger technique a Quad Lumen 16cm CVL was placed in the Right, Internal Jugular Vein via direct cannulation with 1 number of attempts for Monitoring, Blood Drawing and IV Access. Ultrasound Guidance was utilized. There was good dark, non-pulsatile blood return in all ports. Biopatch placed around catheter. Sterile Bio-occlusive dressing placed. The following complications were encountered: None. A follow-up chest x-ray was ordered post procedure. The procedure was tolerated well. Maryan Gonsalves [facility] Critical Care Medicine [facility]

## 2020-08-18 NOTE — PROGRESS NOTES
Cardiology Progress Note Admit Date: 8/16/2020 Admit Diagnosis: Heart failure ([address] [I50.9] Date: 8/18/2020     Time: 8:49 AM 
Assessment/Plan/Discussion:Cardiology Attending:  
 
Patient seen on the day of progress note and examined  and agree with Advance Practice Provider (BK, NP,PA)  assessment and plans. Zeinab Hernandez is a 82 y.o. female Highly complex case with an this patient having many comorbidities. Met with her and her son this morning and then came back in the afternoon to have a family meeting with Nathaly Murphy her son. She was in good spirits this morning not remembering much of the events of the prior day in the afternoon. We reviewed the events which include her getting to the floor and being hypotensive in the evening and then having possible PEA getting CPR epinephrine and intubated but then quickly extubated. The EKGs showed T wave inversions in the anterior leads and some J point elevation but Dr Zachary Thomas ( he called back right when paged by CCU) did not feel the STTs were a STEMI and I dont see the type of STT change to indicate STEMI, This is ischemia in the LAD territory, though due to increased myocardial demand and a current NSTEMI. Lab Results Component Value Date/Time CK 79 12/27/2019 02:30 AM  
 CK - MB 4.2 (H) 12/27/2019 02:30 AM  
 CK-MB Index 5.3 (H) 12/27/2019 02:30 AM  
 Troponin-I, Qt. 2.78 (H) 08/18/2020 05:33 AM  
 
 
She had large pleural effusions and seemed to have shortness of breath had been given Lasix 1 dose yesterday with known AOC CHF. She has a pericardial effusion that was reexamined last night &  I reviewed those films also. I do not feel there is tamponade I agree with Dr. Sandoval Smith interpretation of a persistent moderate pericardial effusion. The ejection fraction is optimistically at 20 to 25%.    
 
I reviewed the cath films they show a severe lesion in the proximal LAD nextto the  left main,  a long lesion all throughout the mid RCA and several sequential lesions in the circumflex. She is not of a state to go through CABG either then or now. I spoke to the son who indicates that they were seeing Dr. Brenda Aparicio initially for the metastatic breast cancer. There were several bouts of chemotherapy but they were limited because of the frequent UTIs that she suffered. They then had switched over to Dr. Karson Grant at [facility]. He indicates they did XRT to the liver and were planning a follow-up CAT scan in 2 months. I went over the cath films EKGs echocardiogram and other cardiac findings on the case. She is had relative hypotension and is now off diuretics and is on pressors though improving down from 2 pressors to 1from  this morning. She is also on Midodrine. She has pyuria but is unclear about if she is got a full-blown urosepsis as we are waiting on cultures but she is already being treated by the ICU team with Ceftriaxone. From a cardiac point of view is very limited to offer therapy here she is a poor candidate for any kind of intervention she may not survive any procedures  The aortic stenosis looks tighter on planimetry than the Doppler measurements but that may be because of low output, low gradient AS due to congestive heart failure. The gradient  was not however that bad on previous echoes when her EF was better at 45%. She currently seems to be resting and this morning was in good spirits the main care here is supportive; if she has infection and then try to balance her volume status with her heart failure. Her heart failure meds are now being held because of the relative hypotension. Patient patient son was comfortable  with plans and we will continue to monitor closely and follow with the various teams.  
Ricardo Chan MD   
 
 
 
HPI: 82 y.o. female with PMH of significant multivessel CAD, systolic CHF, CKD, colon cancer, DM, HTN, metastatic breast cancer with liver lesion, CVA, COVID 19, osteoporosis, RA, anemia. Was discharged 8/16/20 after hospitalization for CHF exacerbation and elevated troponin with noted further reduction in EF to 25%. Presented again in evning 8/16/20 with reported fever and SOB with family ocncnerned for pulmonary edema. Notable bilateral pleural effusions on Xray (R>L). Troponin elevated but less than last admission. Received trial of lasix IV with improvement in SOB but bp low-marginal. Pt SOB was improved in afternoon. Overnight events noted. Per notes pt was in the ER most of day (due to bed status), transferred to [facility] and was noted to be hypotensive with SBP in 70's. Received IVF and midodrine. Had AMS, code stroke called and eventual code blue as reportedly no pulse obtained. There is ? PEA arrest.  She had 1 round of CPR and 1 dose epinephrine and bicarb and was intubated. By time she arrived at CCU, she was awake and BP elevated. Neuro intact and she was extubated shortly thereafter. She was given 1 unit PRBCs for anemia. EKG on arrival was concerning for STEMI in anterior leads and eventual code stemi was called. Dr. Param Toussaint reviewed case and also felt that cath would not be beneficial. Stat echo done which showed small pericardial effusion but no tamponade. Also had no significant change in EF (EF 20-25%) with severe global, anteroapical hypokinesis, mild -mod MR. She is currently on low dose levophed and neosynephrine for BP support. She is awake and alert. Denies any chest pain and no SOB currently. Son Niyah Johnson is at bedside. COVID test negative. Assessment and Plan 1. Hypotension/shock:  
 -Suspect CAD/LV dysfunction contributing  
 -BP improved, normalized after IVF, PRBC and vasopressor support. 
 -Wean vasopressors as able.  Midodrine started by primary team. 
 
2.. CAD/Elevated troponin with mild ST elevation V2, V3 
 -Troponin with slow trend up (now 2.73 from 1.79 yesterday) but not as high as would be expected with STEMI and post CPR. -no current chest pain. 
            -Would not anticoagulate at any rate, given anemia, need for PRBC 
 -Continue ASA, plavix, statin. BB on hold for hypotension 
 -Echo with anteroapical hypokinesis, severe 
 -Severe diffuse coronary disease by cath 2018 not felt to be a candidate for CABG or PCI and pt is not a candidate for invasive cardiac testing. 
                 
3. Acute on chronic systolic CHF: EF 40-00% 0/14/77, (was 25% (8/14/20) -NYHA class IV 
            -ProBNP trending up albeit in setting of worsening JEROD  
            -Received lasix 20 mg IV x 1 post PRBC transfusion. Hold further diuretics for now. -Coreg held (low BP on vasopressors) 
            -Continue to hold lisinopril given rise in creatinine and low BP. 
  
4. JEROD on CKD: creatinine trending up Lab Results Component Value Date/Time Creatinine 1.95 (H) 08/18/2020 05:33 AM  
 -Hold further diuretics for now.  
 -Hold lisinopril. 5. Bilateral pleural effusions: R>L 
            -defer to primary team if tap warranted. 
              
6. Small Pericardial effusion: by TTE 8/17/20 
 -No tamponade. No intervention needed 7. . Chronic anemia:   
 -Hgb Improved (9.3) from 7.3 post 1 unit PRBCs 8. Mild Aortic stenosis/ Mild-mod MR             
  
9. PFO: noted again on repeat echo Left to right shunt. 
  
10. Hx of HTN:  bp low now. 
            -holding coreg, lisinopril 11. Hx of COVID 19 June 2020 
            -Repeat covid test 8/17/20 again negative 12. Metastatic breast cancer with liver lesion and osseous metastatic lesions.  
            -hx of Tumor erosion of left breast, causing bleeding  
            -Has been seen as 2nd opinion by Dr. Joya Dai at [facility] 
            - Noted in chart, Has scheduled appt at [facility] for possible excision 13. DM type II: A1C 8.8 14. Advanced directives:  Full code status. .  
  
82 y.o. with hx of systolic CHF, diffuse CAD not candidate for intervention/CABG. Now presented with fatigue, SOB (per chart) and  bilateral pleural effusions. Events overnight noted as above. Suspect ischemia/CAD and LV dysfunction one of the contributors to hypotension. Will hold coreg and lisinopril for now. Renal function worsening and will hold further diuretics for now as pt is comfortable, denies SOB and is on NC. Dr. Brenda Gonzalez saw pt, discussed with intensivist. Discussed also with pt's son regarding delicate balance of CHF/CAD meds and maintaining adequate BP in setting of LV dysfunction and severe CAD. Dr. Elvin Meyers will review echo images and re-review chart and meet with pt's son again at [facility] today with further plan. Subjective: Fabiano Bud denies chest pain, SOB. C/o left side/hip pain and Left abdominal tenderness. Past Medical History:  
Diagnosis Date  Acute on chronic systolic HF (heart failure) ([address] 5/19/2018  Age-related osteoporosis without current pathological fracture 9/2/2009  Anemia 9/2/2009  Asymptomatic hyperuricemia 2/16/2017  Breast cancer ([address]  CAD (coronary artery disease) 6/21/2018  Carotid bruit 8/31/2011  CHF (congestive heart failure) ([address]  CKD (chronic kidney disease) stage 3, GFR 30-59 ml/min ([facility]) 10/25/2017  Colon cancer ([address] 9/2/2009  
 surgery/chemo  Colon cancer ([address] 9/2/2009  COVID-19   
 DM (diabetes mellitus) ([address] 9/2/2009  GERD (gastroesophageal reflux disease)  H/O: CVA 9/2/2009  
 slight l sided weakness  Heart attack ([address]  HTN, goal below 140/90 9/2/2009  Hypothyroid 9/2/2009  Ill-defined condition   
 seasonal allergies  Long-term use of immunosuppressant medication 11/21/2016  Metastatic breast cancer ([address] 6/1/2018  Microalbuminuria 9/2/2009  Osteoporosis 9/2/2009  Other and unspecified hyperlipidemia 1/27/2010  PAD (peripheral artery disease) ([facility] 9/7/2011  Primary osteoarthritis of both knees 9/28/2016  Rheumatoid arthritis involving ankle ([facility] 9/28/2016  Rheumatoid arthritis(714.0) 9/2/2009  Seropositive rheumatoid arthritis of multiple sites ([facility] 9/28/2016  Statin intolerance 12/21/2016  Type 2 diabetes mellitus with neurologic complication, with long-term current use of insulin ([facility] 9/2/2009  Type 2 diabetes with nephropathy ([facility] 8/20/2018  Unspecified essential hypertension 9/2/2009  Vitamin D deficiency 6/16/2017  Weakness due to cerebrovascular accident Social Hx Social History Socioeconomic History  Marital status:  Spouse name: Not on file  Number of children: Not on file  Years of education: Not on file  Highest education level: Not on file Occupational History  Not on file Social Needs  Financial resource strain: Not on file  Food insecurity Worry: Not on file Inability: Not on file  Transportation needs Medical: Not on file Non-medical: Not on file Tobacco Use  Smoking status: Never Smoker  Smokeless tobacco: Never Used Substance and Sexual Activity  Alcohol use: No  
 Drug use: No  
 Sexual activity: Not Currently Partners: Male Lifestyle  Physical activity Days per week: Not on file Minutes per session: Not on file  Stress: Not on file Relationships  Social connections Talks on phone: Not on file Gets together: Not on file Attends Yazdanism service: Not on file Active member of club or organization: Not on file Attends meetings of clubs or organizations: Not on file Relationship status: Not on file  Intimate partner violence Fear of current or ex partner: Not on file Emotionally abused: Not on file Physically abused: Not on file Forced sexual activity: Not on file Other Topics Concern  Not on file Social History Narrative  Not on file Objective: 
  
 Physical Exam: 
             
Visit Vitals /51 Pulse 86 Temp 98 °F (36.7 °C) Resp 24 Ht 5' 1\" (1.549 m) Wt 136 lb 14.5 oz (62.1 kg) SpO2 100% BMI 25.87 kg/m² General Appearance:   Well developed,,alert and oriented  Individual in no acute distress. Aftab Peng Ears/Nose/Mouth/Throat:    Hearing grossly normal. 
  
    Neck:  Supple. Chest:    Lungs bibasilar crackles to auscultation bilaterally. No use of accessory  Muscles at rest  
Cardiovascular:    Regular rate and rhythm, S1, S2 normal, no murmur. Abdomen:    Soft, +LUQ ttp. Extremities:  No edema bilaterally. BLE SCDs in place Skin:  Warm and dry. Telemetry: NSR Data Review:  
 Labs:   
Recent Results (from the past 24 hour(s)) GLUCOSE, POC Collection Time: 08/17/20 11:48 AM  
Result Value Ref Range Glucose (POC) 248 (H) 65 - 100 mg/dL Performed by Reynaldo GOMEZ   
GLUCOSE, POC Collection Time: 08/17/20  5:02 PM  
Result Value Ref Range Glucose (POC) 199 (H) 65 - 100 mg/dL Performed by Pastora Cabello GLUCOSE, POC Collection Time: 08/17/20  7:18 PM  
Result Value Ref Range Glucose (POC) 166 (H) 65 - 100 mg/dL Performed by Christi Delgado EKG, 12 LEAD, INITIAL Collection Time: 08/17/20  7:22 PM  
Result Value Ref Range Ventricular Rate 97 BPM  
 Atrial Rate 97 BPM  
 P-R Interval 144 ms QRS Duration 82 ms Q-T Interval 376 ms QTC Calculation (Bezet) 477 ms Calculated P Axis 54 degrees Calculated R Axis 29 degrees Calculated T Axis 144 degrees Diagnosis Normal sinus rhythm Septal infarct (cited on or before 25-DEC-2019) ST & T wave abnormality, consider anterolateral ischemia When compared with ECG of 17-AUG-2020 00:00, 
T wave inversion more evident in Anterolateral leads TROPONIN I Collection Time: 08/17/20  7:35 PM  
Result Value Ref Range Troponin-I, Qt. 2.31 (H) <0.05 ng/mL METABOLIC PANEL, BASIC Collection Time: 08/17/20  7:35 PM  
Result Value Ref Range Sodium 131 (L) 136 - 145 mmol/L Potassium 5.1 3.5 - 5.1 mmol/L Chloride 103 97 - 108 mmol/L  
 CO2 20 (L) 21 - 32 mmol/L Anion gap 8 5 - 15 mmol/L Glucose 161 (H) 65 - 100 mg/dL BUN 52 (H) 6 - 20 MG/DL Creatinine 1.90 (H) 0.55 - 1.02 MG/DL  
 BUN/Creatinine ratio 27 (H) 12 - 20 GFR est AA 31 (L) >60 ml/min/1.73m2 GFR est non-AA 25 (L) >60 ml/min/1.73m2 Calcium 7.9 (L) 8.5 - 10.1 MG/DL  
CBC WITH AUTOMATED DIFF Collection Time: 08/17/20  8:10 PM  
Result Value Ref Range WBC 6.6 3.6 - 11.0 K/uL  
 RBC 3.03 (L) 3.80 - 5.20 M/uL HGB 7.8 (L) 11.5 - 16.0 g/dL HCT 25.7 (L) 35.0 - 47.0 % MCV 84.8 80.0 - 99.0 FL  
 MCH 25.7 (L) 26.0 - 34.0 PG  
 MCHC 30.4 30.0 - 36.5 g/dL  
 RDW 19.6 (H) 11.5 - 14.5 % PLATELET 371 565 - 328 K/uL MPV 10.3 8.9 - 12.9 FL  
 NRBC 0.8 (H) 0  WBC ABSOLUTE NRBC 0.05 (H) 0.00 - 0.01 K/uL NEUTROPHILS 66 32 - 75 % LYMPHOCYTES 14 12 - 49 % MONOCYTES 12 5 - 13 % EOSINOPHILS 1 0 - 7 % BASOPHILS 1 0 - 1 % IMMATURE GRANULOCYTES 6 (H) 0.0 - 0.5 % ABS. NEUTROPHILS 4.3 1.8 - 8.0 K/UL  
 ABS. LYMPHOCYTES 0.9 0.8 - 3.5 K/UL  
 ABS. MONOCYTES 0.8 0.0 - 1.0 K/UL  
 ABS. EOSINOPHILS 0.1 0.0 - 0.4 K/UL  
 ABS. BASOPHILS 0.1 0.0 - 0.1 K/UL  
 ABS. IMM. GRANS. 0.4 (H) 0.00 - 0.04 K/UL  
 DF SMEAR SCANNED    
 RBC COMMENTS ANISOCYTOSIS 2+ SAMPLES BEING HELD Collection Time: 08/17/20  8:10 PM  
Result Value Ref Range SAMPLES BEING HELD 1PST COMMENT Add-on orders for these samples will be processed based on acceptable specimen integrity and analyte stability, which may vary by analyte. TYPE + CROSSMATCH Collection Time: 08/17/20  8:10 PM  
Result Value Ref Range Crossmatch Expiration 08/20/2020 ABO/Rh(D) O POSITIVE Antibody screen NEG Unit number [id number] Blood component type RC LR Unit division 00 Status of unit ISSUED Crossmatch result Compatible EKG, 12 LEAD, INITIAL Collection Time: 08/17/20  9:10 PM  
Result Value Ref Range Ventricular Rate 98 BPM  
 Atrial Rate 98 BPM  
 P-R Interval 152 ms QRS Duration 86 ms  
 Q-T Interval 374 ms QTC Calculation (Bezet) 477 ms Calculated P Axis 62 degrees Calculated R Axis 72 degrees Calculated T Axis 153 degrees Diagnosis Normal sinus rhythm Anteroseptal infarct (cited on or before 25-DEC-2019) ST & T wave abnormality, consider lateral ischemia When compared with ECG of 17-AUG-2020 19:22, 
Questionable change in initial forces of Anterior leads ST elevation now present in Anterior leads METABOLIC PANEL, COMPREHENSIVE Collection Time: 08/17/20  9:21 PM  
Result Value Ref Range Sodium 135 (L) 136 - 145 mmol/L Potassium 4.7 3.5 - 5.1 mmol/L Chloride 105 97 - 108 mmol/L  
 CO2 20 (L) 21 - 32 mmol/L Anion gap 10 5 - 15 mmol/L Glucose 186 (H) 65 - 100 mg/dL BUN 51 (H) 6 - 20 MG/DL Creatinine 1.83 (H) 0.55 - 1.02 MG/DL  
 BUN/Creatinine ratio 28 (H) 12 - 20 GFR est AA 32 (L) >60 ml/min/1.73m2 GFR est non-AA 26 (L) >60 ml/min/1.73m2 Calcium 7.4 (L) 8.5 - 10.1 MG/DL Bilirubin, total 0.7 0.2 - 1.0 MG/DL  
 ALT (SGPT) 191 (H) 12 - 78 U/L  
 AST (SGOT) 363 (H) 15 - 37 U/L Alk. phosphatase 339 (H) 45 - 117 U/L Protein, total 6.4 6.4 - 8.2 g/dL Albumin 2.3 (L) 3.5 - 5.0 g/dL Globulin 4.1 (H) 2.0 - 4.0 g/dL A-G Ratio 0.6 (L) 1.1 - 2.2    
CBC W/O DIFF Collection Time: 08/17/20  9:21 PM  
Result Value Ref Range WBC 8.5 3.6 - 11.0 K/uL  
 RBC 2.84 (L) 3.80 - 5.20 M/uL HGB 7.3 (L) 11.5 - 16.0 g/dL HCT 24.0 (L) 35.0 - 47.0 % MCV 84.5 80.0 - 99.0 FL  
 MCH 25.7 (L) 26.0 - 34.0 PG  
 MCHC 30.4 30.0 - 36.5 g/dL  
 RDW 19.5 (H) 11.5 - 14.5 % PLATELET 335 019 - 576 K/uL MPV 9.9 8.9 - 12.9 FL  
 NRBC 0.4 (H) 0  WBC ABSOLUTE NRBC 0.03 (H) 0.00 - 0.01 K/uL MAGNESIUM Collection Time: 08/17/20  9:21 PM  
Result Value Ref Range Magnesium 2.0 1.6 - 2.4 mg/dL PHOSPHORUS Collection Time: 08/17/20  9:21 PM  
Result Value Ref Range Phosphorus 2.9 2.6 - 4.7 MG/DL PROTHROMBIN TIME + INR Collection Time: 08/17/20  9:21 PM  
Result Value Ref Range INR 1.3 (H) 0.9 - 1.1 Prothrombin time 12.8 (H) 9.0 - 11.1 sec TROPONIN I Collection Time: 08/17/20  9:21 PM  
Result Value Ref Range Troponin-I, Qt. 2.28 (H) <0.05 ng/mL NT-PRO BNP Collection Time: 08/17/20  9:21 PM  
Result Value Ref Range NT pro-BNP 13,136 (H) <450 PG/ML  
CULTURE, BLOOD, PAIRED Collection Time: 08/17/20  9:21 PM  
 Specimen: Blood Result Value Ref Range Special Requests: NO SPECIAL REQUESTS Culture result: NO GROWTH AFTER 7 HOURS    
LACTIC ACID Collection Time: 08/17/20  9:36 PM  
Result Value Ref Range Lactic acid 1.3 0.4 - 2.0 MMOL/L  
ECHO ADULT FOLLOW-UP OR LIMITED Collection Time: 08/17/20 11:44 PM  
Result Value Ref Range IVSd 0.69 0.6 - 0.9 cm LVIDd 4.75 3.9 - 5.3 cm LVIDs 4.06 cm  
 LVOT d 1.85 cm  
 LVPWd 0.83 0.6 - 0.9 cm  
 BP EF 31.6 (A) 55 - 100 percent LV Ejection Fraction MOD 2C 38 percent LV Ejection Fraction MOD 4C 24 percent LV ED Vol A2C 106.23 mL  
 LV ED Vol A4C 93.88 mL  
 LV ED Vol .09 56 - 104 mL  
 LV ES Vol A2C 66.15 mL  
 LV ES Vol A4C 71.19 mL  
 LV ES Vol BP 69.86 (A) 19 - 49 mL  
 LVOT Peak Gradient 1.09 mmHg LVOT SV 24.9 mL  
 LVOT Peak Velocity 52.20 cm/s LVOT VTI 9.28 cm RVIDd 3.60 cm RVSP 48.25 mmHg Left Atrium Major Axis 4.38 cm  
 LA Volume 56.84 22 - 52 mL  
 LA Area 4C 18.26 cm2 LA Vol 2C 51.94 22 - 52 mL  
 LA Vol 4C 47.14 22 - 52 mL Est. RA Pressure 15.00 mmHg Aortic Valve Area by Planimetry 0.56 cm2 Aortic Valve Area by Continuity of Peak Velocity 0.63 cm2  Aortic Valve Area by Continuity of VTI 0.51 cm2  
 AoV PG 19.79 mmHg Aortic Valve Systolic Mean Gradient 63.25 mmHg Aortic Valve Systolic Peak Velocity 470.80 cm/s AoV VTI 48.89 cm  
 MV A Grover 111.89 cm/s Mitral Valve E Wave Deceleration Time 0.13 s MV E Grover 96.22 cm/s Mitral Valve Pressure Half-time 0.04 s MVA (PHT) 5.91 cm2 MVA VTI 1.06 cm2 MV Peak Gradient 7.89 mmHg MV Mean Gradient 3.55 mmHg Mitral Valve Pressure Half-time 0.05 s Mitral Valve Max Velocity 140.43 cm/s Mitral Valve Annulus Velocity Time Integral 23.37 cm  
 MVA (PHT) 4.78 cm2 Pulmonic Valve Systolic Peak Instantaneous Gradient 3.88 mmHg Tapse 1.35 (A) 1.5 - 2.0 cm Triscuspid Valve Regurgitation Peak Gradient 33.25 mmHg  
 TR Max Velocity 288.29 cm/s Ao Root D 2.59 cm  
 MV E/A 0.86 LV Mass .5 67 - 162 g  
 LV Mass AL Index 72.4 43 - 95 g/m2 LVES Vol Index BP 43.4 mL/m2 LVED Vol Index BP 63.5 mL/m2 Left Atrium Minor Axis 2.72 cm  
 LA Vol Index 35.34 16 - 28 ml/m2 LA Vol Index 32.29 16 - 28 ml/m2 LA Vol Index 29.31 16 - 28 ml/m2 LVED Vol Index A4C 58.4 mL/m2 LVED Vol Index A2C 66.0 mL/m2 LVES Vol Index A4C 44.3 mL/m2 LVES Vol Index A2C 41.1 mL/m2 KANDI/BSA Pk Grover 0.4 cm2/m2 KANDI/BSA VTI 0.3 cm2/m2 URINALYSIS W/MICROSCOPIC Collection Time: 08/18/20 12:53 AM  
Result Value Ref Range Color YELLOW Appearance TURBID (A) CLEAR Specific gravity 1.015 1.003 - 1.030    
 pH (UA) 6.0 5.0 - 8.0 Protein 300 (A) NEG mg/dL Glucose Negative NEG mg/dL Ketone TRACE (A) NEG mg/dL Bilirubin Negative NEG Blood LARGE (A) NEG Urobilinogen 0.2 0.2 - 1.0 EU/dL Nitrites Negative NEG Leukocyte Esterase LARGE (A) NEG    
 WBC >100 (H) 0 - 4 /hpf  
 RBC 10-20 0 - 5 /hpf Epithelial cells FEW FEW /lpf Bacteria 2+ (A) NEG /hpf URINE CULTURE HOLD SAMPLE Collection Time: 08/18/20 12:53 AM  
 Specimen: Serum; Urine Result Value Ref Range Urine culture hold Urine on hold in Microbiology dept for 2 days. If unpreserved urine is submitted, it cannot be used for addtional testing after 24 hours, recollection will be required. TROPONIN I Collection Time: 08/18/20  5:33 AM  
Result Value Ref Range Troponin-I, Qt. 2.78 (H) <0.05 ng/mL NT-PRO BNP Collection Time: 08/18/20  5:33 AM  
Result Value Ref Range NT pro-BNP 26,467 (H) <112 PG/ML  
METABOLIC PANEL, BASIC Collection Time: 08/18/20  5:33 AM  
Result Value Ref Range Sodium 134 (L) 136 - 145 mmol/L Potassium 5.3 (H) 3.5 - 5.1 mmol/L Chloride 105 97 - 108 mmol/L  
 CO2 17 (L) 21 - 32 mmol/L Anion gap 12 5 - 15 mmol/L Glucose 231 (H) 65 - 100 mg/dL BUN 54 (H) 6 - 20 MG/DL Creatinine 1.95 (H) 0.55 - 1.02 MG/DL  
 BUN/Creatinine ratio 28 (H) 12 - 20 GFR est AA 30 (L) >60 ml/min/1.73m2 GFR est non-AA 25 (L) >60 ml/min/1.73m2 Calcium 7.4 (L) 8.5 - 10.1 MG/DL  
CBC WITH AUTOMATED DIFF Collection Time: 08/18/20  5:33 AM  
Result Value Ref Range WBC 9.4 3.6 - 11.0 K/uL  
 RBC 3.53 (L) 3.80 - 5.20 M/uL HGB 9.3 (L) 11.5 - 16.0 g/dL HCT 30.6 (L) 35.0 - 47.0 % MCV 86.7 80.0 - 99.0 FL  
 MCH 26.3 26.0 - 34.0 PG  
 MCHC 30.4 30.0 - 36.5 g/dL  
 RDW 18.8 (H) 11.5 - 14.5 % PLATELET 518 256 - 385 K/uL MPV 9.8 8.9 - 12.9 FL  
 NRBC 0.0 0  WBC ABSOLUTE NRBC 0.00 0.00 - 0.01 K/uL NEUTROPHILS 85 (H) 32 - 75 % LYMPHOCYTES 3 (L) 12 - 49 % MONOCYTES 9 5 - 13 % EOSINOPHILS 0 0 - 7 % BASOPHILS 1 0 - 1 % IMMATURE GRANULOCYTES 2 (H) 0.0 - 0.5 % ABS. NEUTROPHILS 8.0 1.8 - 8.0 K/UL  
 ABS. LYMPHOCYTES 0.3 (L) 0.8 - 3.5 K/UL  
 ABS. MONOCYTES 0.8 0.0 - 1.0 K/UL  
 ABS. EOSINOPHILS 0.0 0.0 - 0.4 K/UL  
 ABS. BASOPHILS 0.1 0.0 - 0.1 K/UL  
 ABS. IMM. GRANS. 0.2 (H) 0.00 - 0.04 K/UL  
 DF SMEAR SCANNED    
 RBC COMMENTS ANISOCYTOSIS 1+ 
    
 RBC COMMENTS MACHO CELLS 
PRESENT 
    
MAGNESIUM  Collection Time: 08/18/20  5:34 AM  
 Result Value Ref Range Magnesium 2.0 1.6 - 2.4 mg/dL GLUCOSE, POC Collection Time: 08/18/20  7:58 AM  
Result Value Ref Range Glucose (POC) 235 (H) 65 - 100 mg/dL Performed by [facility]:  
 
  
Current Facility-Administered Medications Medication Dose Route Frequency  sodium chloride (NS) flush 5-40 mL  5-40 mL IntraVENous Q8H  
 sodium chloride (NS) flush 5-40 mL  5-40 mL IntraVENous PRN  
 acetaminophen (TYLENOL) tablet 650 mg  650 mg Oral Q6H PRN Or  
 acetaminophen (TYLENOL) suppository 650 mg  650 mg Rectal Q6H PRN  polyethylene glycol (MIRALAX) packet 17 g  17 g Oral DAILY PRN  
 ondansetron (ZOFRAN) injection 4 mg  4 mg IntraVENous Q6H PRN  
 glucose chewable tablet 16 g  4 Tab Oral PRN  
 glucagon (GLUCAGEN) injection 1 mg  1 mg IntraMUSCular PRN  
 dextrose 10% infusion 0-250 mL  0-250 mL IntraVENous PRN  
 insulin lispro (HUMALOG) injection   SubCUTAneous AC&HS  aspirin delayed-release tablet 81 mg  81 mg Oral DAILY  clopidogreL (PLAVIX) tablet 75 mg  75 mg Oral DAILY  carvediloL (COREG) tablet 6.25 mg  6.25 mg Oral BID  levothyroxine (SYNTHROID) tablet 88 mcg  88 mcg Oral ACB  [Held by provider] lisinopriL (PRINIVIL, ZESTRIL) tablet 5 mg  5 mg Oral DAILY  cefTRIAXone (ROCEPHIN) 1 g in 0.9% sodium chloride (MBP/ADV) 50 mL  1 g IntraVENous Q24H  
 0.9% sodium chloride infusion 250 mL  250 mL IntraVENous PRN  
 PHENYLephrine (RICARDO-SYNEPHRINE) 100 mg in 0.9% sodium chloride 250 mL infusion   mcg/min IntraVENous TITRATE  
 NOREPINephrine (LEVOPHED) 32,000 mcg in dextrose 5% 250 mL (128 mcg/mL) infusion  0.5-30 mcg/min IntraVENous TITRATE Flores Irwin. [facility] [address] [place] [address], [address] OrientRadha collins 
 [phone]

## 2020-08-18 NOTE — PROGRESS NOTES
[address] Documentation Name: Brennan Light YOB: 1935 MRN: [id number] Admission Date: 8/16/2020 11:24 PM 
 
Date of service: 8/17/2020 Active Diagnoses: 
 
Hospital Problems  Date Reviewed: 7/23/2020 Codes Class Noted POA Heart failure ([facility] [address] ICD-10-CM: I50.9 ICD-9-CM: 428.9  8/17/2020 Unknown Patient was a DNR DNI but son was at the bedside and when patient lost pulse reversed code status and requested CPR. Code blue caleed at 1946 for asystole. She was given one round of epi and cpr was done for 4 minutes with ROSC. Intubated during code blue. Son at bedside. I discussed clinical condition with son. Transferred to CCU for further management. Time Spent:  
I personally spent 40 minutes in providing critical care time.  
 
Kiran Matias MD 
8/17/2020 
8:29 PM

## 2020-08-18 NOTE — PROGRESS NOTES
Bedside shift change report [facility]on[facility] nurse) by Masha Dubose (offgoing nurse). Report included the following information SBAR, Kardex, Intake/Output, MAR, Recent Results and Cardiac Rhythm NSR.

## 2020-08-18 NOTE — PROGRESS NOTES
2030: Patient transferred to CCU from [facility] with RT, Taryn Diego, this RN, and IMCU RN. Patient arrived - hypotensive with only access being IO placed during code. NP Wanda Flores at bedside to place central line. Patient tolerated procedure. 2110: Post ROSC EKG done - slight ST changes compared to EKG done at beginning of rapid response on IMCU. 2115: Code STEMI called. 2138: Still no returned page from cardiologist will speak with . Page was not sent out. Will re-page. 2140: Dr. Gisela Bal on phone with [address] for STAT echo but no cath lab. 2149: Paged echo tech. 2155: Spoke with Echo tech. Will be coming in.  
2311: Echo tech at bedside.

## 2020-08-18 NOTE — PROGRESS NOTES
Problem: Mobility Impaired (Adult and Pediatric) Goal: *Acute Goals and Plan of Care (Insert Text) Description: Description: FUNCTIONAL STATUS PRIOR TO ADMISSION: Patient with discharge 8/16/20 and home ~10 hours before return to ER. Prior to that patient was modified independent using a walker for functional mobility. HOME SUPPORT: The patient lived with son, also receives assistance from caregivers (one for several hours in AM, one for several hours in PM). Per patient, they provide assistance with bathing (shower transfers, cleaning/rinsing), dressing, and toileting (occasionally hygiene and clothing management). Increased assistance needed recently. Physical Therapy Goals Initiated 8/18/2020 1. Patient will move from supine to sit and sit to supine , scoot up and down, and roll side to side in bed with modified independence within 7 day(s). 2.  Patient will transfer from bed to chair and chair to bed with modified independence using the least restrictive device within 7 day(s). 3.  Patient will perform sit to stand with modified independence within 7 day(s). 4.  Patient will ambulate with modified independence for 50 feet with the least restrictive device within 7 day(s). 5.  Patient will ascend/descend 5 stairs with handrail(s) with minimal assistance/contact guard assist within 7 day(s). Outcome: Not Met PHYSICAL THERAPY EVALUATION Patient: Josey Khoury (82 y.o. female) Date: 8/18/2020 Primary Diagnosis: Heart failure ([facility] [address] [I50.9] Precautions: fall ASSESSMENT Based on the objective data described below, the patient presents with impaired functional mobility compared to baseline secondary to code CVA, code STEMI and code blue last night. Currently patient's functional mobility is limited by impaired balance, global weakness, decreased tolerance to activity, hypotension (BP improved with activity).   Patient fatigued with minimal activity (bed mobility and sitting EOB <1 min). She was able to manage a few steps from bed to chair. She remained sitting up with her supportive son in the room. Patient will benefit from acute therapy to address her deficits. Anticipate home with HHPT and family/ caregivers assisting. Current Level of Function Impacting Discharge (mobility/balance): Up to moderate assistance Functional Outcome Measure: The patient scored 4/28 on the Tinetti outcome measure which is indicative of high fall risk. Other factors to consider for discharge: Lives with supportive son; has day time caregivers Patient will benefit from skilled therapy intervention to address the above noted impairments. PLAN : 
Recommendations and Planned Interventions: bed mobility training, transfer training, gait training, therapeutic exercises, patient and family training/education, and therapeutic activities Frequency/Duration: Patient will be followed by physical therapy:  5 times a week to address goals. Recommendation for discharge: (in order for the patient to meet his/her long term goals) To be determined: pending progress. Anticipate home with HHPT and family assisting. This discharge recommendation: A follow-up discussion with the attending provider and/or case management is planned IF patient discharges home will need the following DME: none SUBJECTIVE:  
Patient stated Ok.  OBJECTIVE DATA SUMMARY:  
HISTORY:   
Past Medical History:  
Diagnosis Date Acute on chronic systolic HF (heart failure) ([facility] [address] 5/19/2018 Age-related osteoporosis without current pathological fracture 9/2/2009 Anemia 9/2/2009 Asymptomatic hyperuricemia 2/16/2017 [facility] CAD (coronary artery disease) 6/21/2018 Carotid bruit 8/31/2011 CHF (congestive heart failure) ([facility] CKD (chronic kidney disease) stage 3, GFR 30-59 ml/min ([facility]) 10/25/2017 Colon cancer ([facility] 9/2/2009  
 surgery/chemo Colon cancer ([facility] 9/2/2009 COVID-19 DM (diabetes mellitus) ([facility] [address] [facility] 9/2/2009 GERD (gastroesophageal reflux disease) H/O: CVA 9/2/2009  
 slight l sided weakness Heart attack ([facility] [address] HTN, goal below 140/90 9/2/2009 Hypothyroid 9/2/2009 Ill-defined condition   
 seasonal allergies Long-term use of immunosuppressant medication 11/21/2016 Metastatic breast cancer ([facility] 6/1/2018 Microalbuminuria 9/2/2009 Osteoporosis 9/2/2009 Other and unspecified hyperlipidemia 1/27/2010 PAD (peripheral artery disease) ([facility] 9/7/2011 Primary osteoarthritis of both knees 9/28/2016 Rheumatoid arthritis involving ankle ([facility] 9/28/2016 Rheumatoid arthritis(714.0) 9/2/2009 Seropositive rheumatoid arthritis of multiple sites ([facility] 9/28/2016 Statin intolerance 12/21/2016 Type 2 diabetes mellitus with neurologic complication, with long-term current use of insulin ([facility] 9/2/2009 Type 2 diabetes with nephropathy ([facility] 8/20/2018 Unspecified essential hypertension 9/2/2009 Vitamin D deficiency 6/16/2017 Weakness due to cerebrovascular accident Past Surgical History:  
Procedure Laterality Date HX COLECTOMY HX HYSTERECTOMY HX OTHER SURGICAL    
 colonoscopies numerous since 1993 Personal factors and/or comorbidities impacting plan of care: PMH Home Situation Home Environment: Private residence # Steps to Enter: 5 One/Two Story Residence: Two story # of Interior Steps: (bedroom on first floor) Living Alone: No 
Support Systems: Family member(s), Child(madi) Patient Expects to be Discharged to[de-identified] Private residence Current DME Used/Available at Home: zita Marin EXAMINATION/PRESENTATION/DECISION MAKING:  
Critical Behavior: 
Neurologic State: Alert Orientation Level: Oriented to person, Oriented to place, Disoriented to time, Disoriented to situation Cognition: Follows commands Safety/Judgement: Awareness of environment, Fall prevention, Home safety, Insight into deficits Hearing: Auditory Auditory Impairment: Hard of hearing, bilateral, Hearing aid(s) Hearing Aids/Status: Bilateral, Functioning Range Of Motion: 
AROM: Generally decreased, functional 
  
  
  
  
  
  
  
Strength:   
Strength: Generally decreased, functional 
  
  
  
  
 
   
Coordination: 
Coordination: Generally decreased, functional 
 
Functional Mobility: 
Bed Mobility: 
Rolling: Minimum assistance Supine to Sit: Additional time; Moderate assistance;Assist x2 Scooting: Moderate assistance;Assist x2 Transfers: 
Sit to Stand: Minimum assistance;Assist x2 Stand to Sit: Minimum assistance;Assist x2 Bed to Chair: Assist x2;Minimum assistance Balance:  
Sitting: Intact; Without support Sitting - Static: Good (unsupported) Sitting - Dynamic: Good (unsupported) Standing: Impaired; With support Standing - Static: Constant support Standing - Dynamic : Constant support Ambulation/Gait Training: 
Distance (ft): 4 Feet (ft) Ambulation - Level of Assistance: (bilateral hand held assist) Gait Abnormalities: Decreased step clearance(limp) Speed/Haylee: Slow Step Length: Right shortened;Left shortened Functional Measure: 
Tinetti test: 
 
Sitting Balance: 1 Arises: 0 Attempts to Rise: 0 Immediate Standing Balance: 0 Standing Balance: 0 Nudged: 0 Eyes Closed: 0 Turn 360 Degrees - Continuous/Discontinuous: 0 Turn 360 Degrees - Steady/Unsteady: 0 Sitting Down: 0 Balance Score: 1 Balance total score Indication of Gait: 0 
R Step Length/Height: 0 
L Step Length/Height: 0 
R Foot Clearance: 1 L Foot Clearance: 1 Step Symmetry: 0 Step Continuity: 0 Path: 1 Trunk: 0 Walking Time: 0 Gait Score: 3 Gait total score Total Score: 4/28 Overall total score Tinetti Tool Score Risk of Falls 
<19 = High Fall Risk 19-24 = Moderate Fall Risk 25-28 = Low Fall Risk Bala GAMEZ. Performance-Oriented Assessment of Mobility Problems in Elderly Patients. [facility]; [id number]. (Scoring Description: PT Bulletin Feb. 10, 1993) Older adults: Gauri Rodriguez et al, 2009; n = [address] [facility] elderly evaluated with ABC, SHANNON, ADL, and IADL) · Mean SHANNON score for males aged 69-68 years = 26.21(3.40) · Mean SHANNON score for females age 69-68 years = 25.16(4.30) · Mean SHANNON score for males over 80 years = 23.29(6.02) · Mean SHANNON score for females over 80 years = 17.20(8.32) Physical Therapy Evaluation Charge Determination History Examination Presentation Decision-Making MEDIUM  Complexity : 1-2 comorbidities / personal factors will impact the outcome/ POC  MEDIUM Complexity : 3 Standardized tests and measures addressing body structure, function, activity limitation and / or participation in recreation  MEDIUM Complexity : Evolving with changing characteristics  MEDIUM Complexity : FOTO score of 26-74 Based on the above components, the patient evaluation is determined to be of the following complexity level: MEDIUM Pain Rating: 
Voiced discomfort left flank Activity Tolerance:  
Fair and Poor - fatigued sitting EOB Please refer to the flowsheet for vital signs taken during this treatment. After treatment patient left in no apparent distress:  
Sitting in chair, Call bell within reach, and Caregiver / family present COMMUNICATION/EDUCATION:  
The patients plan of care was discussed with: Registered nurse. Fall prevention education was provided and the patient/caregiver indicated understanding., Patient/family have participated as able in goal setting and plan of care. , and Patient/family agree to work toward stated goals and plan of care. Thank you for this referral. 
Harmeet Hoyt, PT Time Calculation: 25 mins

## 2020-08-18 NOTE — PROGRESS NOTES
responded to Code Stemi in patients room in CCU. This pateint and family are known as this  just finished up a code blue with the family. Patients sons were still in the CCU waiting area waiting to be taken in to see their mom when the code was called.  consulted with the doctor and nurse in CCU. The doctor came out to the waiting room to visit with one of the brothers as one had left for a few minutes. The doctor explained that the patient had some changes since they had last spoke. He answered all of the sons questions. The staff was still trying to get answers from the stemi team as to what was going to happen.  visited with the [address] to get their questions answered that they had. They had some questions about getting visitors into the hospital to see their mom if it came to comfort care. The sons were emotional with hearing the new news. Doctor said the next update would come after the Echo was done that has been ordered.  provided pastoral care, support, and words of comfort to the sons during this encounter. Spiritual Care will follow up as availability. Rev. Cesia Arenas. [facility] Staff  [facility] Staff  [address] [address] [id number] W: [phone]/ H:[phone] [address] 
[email or web address]

## 2020-08-18 NOTE — PROGRESS NOTES
2330: Bedside and Verbal shift change report given to Dennis Calderon RN (oncoming nurse) by Moses Claire RN (offgoing nurse). Report included the following information SBAR, Kardex, ED Summary, Procedure Summary, Intake/Output, MAR, Recent Results, Med Rec Status and Cardiac Rhythm NSR/ST DEPR.  
 
0000: Resumed pt care, VSS, pt A/O x3, unaware of situation. Not complaining of pain. Castro placed, urine cloudy with sediment, orders obtained for UA/UC. Castro placed. CVP line hooked up, CVP 10. CHG provided Drips: Levophed @ 4, Darrick @ 110 Primary Nurse Kathleen Jay and Francis Ervin RN performed a dual skin assessment on this patient Impairment noted- see wound doc flow sheet Wound: L breast 
Current Bed: ANAIS Calloway Calderon score is 14 Per son pt is receiving radiation, last treatment was August 5th @ [facility]. Would like for her oncologist to be notified of her admission [facility] (oncologist) and Dr. Tamir Paulino: [phone] Radiation every 2 weeks and injections every 2 weeks as well Was supposed to go for bone scan today, Son said it was rescheduled Next Radiation is scheduled for August 25 
 
0130: Order to transfuse 1 unit PRBC (HGB 7.3)  
 
0200: 1 unit PRBC started. Lasix ordered after blood admin is complete  
 
0300: UO 25 in last 2 hr, Dr. Dalila Valdez notified 0430: Blood transfusion complete 0530: No increase in UO despite IV lasix, Dr. Dalila Valdez notified, castro irrigated with 20mL, 30 out 
 
0600: AM labs drawn and sent 0730: Bedside and Verbal shift change report given to ARIANA Minaya (oncoming nurse) by Dennis Calderon RN (offgoing nurse). Report included the following information SBAR, Kardex, ED Summary, Procedure Summary, Intake/Output, MAR, Recent Results, Med Rec Status and Cardiac Rhythm NSR/ST DEPR.

## 2020-08-18 NOTE — PROGRESS NOTES
Problem: Diabetes Self-Management Goal: *Disease process and treatment process Description: Define diabetes and identify own type of diabetes; list 3 options for treating diabetes. Outcome: Progressing Towards Goal 
Goal: *Incorporating nutritional management into lifestyle Description: Describe effect of type, amount and timing of food on blood glucose; list 3 methods for planning meals. Outcome: Progressing Towards Goal 
Goal: *Incorporating physical activity into lifestyle Description: State effect of exercise on blood glucose levels. Outcome: Progressing Towards Goal 
Goal: *Developing strategies to promote health/change behavior Description: Define the ABC's of diabetes; identify appropriate screenings, schedule and personal plan for screenings. Outcome: Progressing Towards Goal 
Goal: *Using medications safely Description: State effect of diabetes medications on diabetes; name diabetes medication taking, action and side effects. Outcome: Progressing Towards Goal 
Goal: *Monitoring blood glucose, interpreting and using results Description: Identify recommended blood glucose targets  and personal targets. Outcome: Progressing Towards Goal 
Goal: *Prevention, detection, treatment of acute complications Description: List symptoms of hyper- and hypoglycemia; describe how to treat low blood sugar and actions for lowering  high blood glucose level. Outcome: Progressing Towards Goal 
Goal: *Prevention, detection and treatment of chronic complications Description: Define the natural course of diabetes and describe the relationship of blood glucose levels to long term complications of diabetes. Outcome: Progressing Towards Goal 
Goal: *Developing strategies to address psychosocial issues Description: Describe feelings about living with diabetes; identify support needed and support network Outcome: Progressing Towards Goal 
Goal: *Insulin pump training Outcome: Progressing Towards Goal 
 Goal: *Sick day guidelines Outcome: Progressing Towards Goal 
Goal: *Patient Specific Goal (EDIT GOAL, INSERT TEXT) Outcome: Progressing Towards Goal 
  
Problem: Patient Education: Go to Patient Education Activity Goal: Patient/Family Education Outcome: Progressing Towards Goal 
  
Problem: Risk for Spread of Infection Goal: Prevent transmission of infectious organism to others Description: Prevent the transmission of infectious organisms to other patients, staff members, and visitors. Outcome: Progressing Towards Goal 
  
Problem: Patient Education:  Go to Education Activity Goal: Patient/Family Education Outcome: Progressing Towards Goal 
  
Problem: Falls - Risk of 
Goal: *Absence of Falls Description: Document Christo Mendes Fall Risk and appropriate interventions in the flowsheet. Outcome: Progressing Towards Goal 
Note: Fall Risk Interventions: 
Mobility Interventions: Communicate number of staff needed for ambulation/transfer, OT consult for ADLs, Patient to call before getting OOB, PT Consult for mobility concerns, PT Consult for assist device competence, Strengthening exercises (ROM-active/passive) Mentation Interventions: Adequate sleep, hydration, pain control, Bed/chair exit alarm, Door open when patient unattended, Evaluate medications/consider consulting pharmacy, Eyeglasses and hearing aids, Increase mobility, Reorient patient, More frequent rounding, Toileting rounds, Room close to nurse's station Medication Interventions: Evaluate medications/consider consulting pharmacy Elimination Interventions: Call light in reach, Patient to call for help with toileting needs, Toileting schedule/hourly rounds, Urinal in reach History of Falls Interventions: Bed/chair exit alarm, Consult care management for discharge planning, Door open when patient unattended, Evaluate medications/consider consulting pharmacy, Investigate reason for fall, Room close to nurse's station Problem: Patient Education: Go to Patient Education Activity Goal: Patient/Family Education Outcome: Progressing Towards Goal 
  
Problem: Pressure Injury - Risk of 
Goal: *Prevention of pressure injury Description: Document Calderon Scale and appropriate interventions in the flowsheet. Outcome: Progressing Towards Goal 
Note: Pressure Injury Interventions: 
Sensory Interventions: Assess changes in LOC, Assess need for specialty bed, Float heels, Discuss PT/OT consult with provider, Keep linens dry and wrinkle-free, Minimize linen layers, Pressure redistribution bed/mattress (bed type), Turn and reposition approx. every two hours (pillows and wedges if needed) Moisture Interventions: Absorbent underpads, Apply protective barrier, creams and emollients, Check for incontinence Q2 hours and as needed, Contain wound drainage, Internal/External urinary devices, Minimize layers Activity Interventions: Assess need for specialty bed, Pressure redistribution bed/mattress(bed type) Mobility Interventions: Assess need for specialty bed, Float heels, Pressure redistribution bed/mattress (bed type), Turn and reposition approx. every two hours(pillow and wedges) Nutrition Interventions: Document food/fluid/supplement intake, Offer support with meals,snacks and hydration Friction and Shear Interventions: Apply protective barrier, creams and emollients, Foam dressings/transparent film/skin sealants, Minimize layers, Transferring/repositioning devices, Lift sheet Problem: Patient Education: Go to Patient Education Activity Goal: Patient/Family Education Outcome: Progressing Towards Goal 
  
Problem: Pain Goal: *Control of Pain Outcome: Progressing Towards Goal 
Goal: *PALLIATIVE CARE:  Alleviation of Pain Outcome: Progressing Towards Goal 
  
Problem: Patient Education: Go to Patient Education Activity Goal: Patient/Family Education Outcome: Progressing Towards Goal 
  
 Problem: Patient Education: Go to Patient Education Activity Goal: Patient/Family Education Outcome: Progressing Towards Goal 
  
Problem: AMI: Day 1 Goal: Off Pathway (Use only if patient is Off Pathway) Outcome: Progressing Towards Goal 
Goal: Activity/Safety Outcome: Progressing Towards Goal 
Goal: Consults, if ordered Outcome: Progressing Towards Goal 
Goal: Diagnostic Test/Procedures Outcome: Progressing Towards Goal 
Goal: Nutrition/Diet Outcome: Progressing Towards Goal 
Goal: Discharge Planning Outcome: Progressing Towards Goal 
Goal: Medications Outcome: Progressing Towards Goal 
Goal: Respiratory Outcome: Progressing Towards Goal 
Goal: Treatments/Interventions/Procedures Outcome: Progressing Towards Goal 
Goal: Psychosocial 
Outcome: Progressing Towards Goal 
Goal: *Eligible patient receives reperfusion therapy thrombolytics/PCI Outcome: Progressing Towards Goal 
Goal: *Optimal pain control at patient's stated goal 
Outcome: Progressing Towards Goal 
Goal: *Hemodynamically stable Outcome: Progressing Towards Goal 
Goal: *Received aspirin and beta-blocker within 24 hours of arrival unless contraindicated Outcome: Progressing Towards Goal 
  
Problem: AMI: Day 2 Goal: Off Pathway (Use only if patient is Off Pathway) Outcome: Progressing Towards Goal 
Goal: Activity/Safety Outcome: Progressing Towards Goal 
Goal: Consults, if ordered Outcome: Progressing Towards Goal 
Goal: Diagnostic Test/Procedures Outcome: Progressing Towards Goal 
Goal: Nutrition/Diet Outcome: Progressing Towards Goal 
Goal: Discharge Planning Outcome: Progressing Towards Goal 
Goal: Medications Outcome: Progressing Towards Goal 
Goal: Respiratory Outcome: Progressing Towards Goal 
Goal: Treatments/Interventions/Procedures Outcome: Progressing Towards Goal 
Goal: Psychosocial 
Outcome: Progressing Towards Goal 
Goal: *Optimal pain control at patient's stated goal 
Outcome: Progressing Towards Goal 
 Goal: *Hemodynamically stable Outcome: Progressing Towards Goal 
Goal: *Demonstrates progressive activity Outcome: Progressing Towards Goal 
Goal: *Tolerating diet Outcome: Progressing Towards Goal 
  
Problem: AMI: Day 3 Goal: Off Pathway (Use only if patient is Off Pathway) Outcome: Progressing Towards Goal 
Goal: Activity/Safety Outcome: Progressing Towards Goal 
Goal: Consults, if ordered Outcome: Progressing Towards Goal 
Goal: Diagnostic Test/Procedures Outcome: Progressing Towards Goal 
Goal: Nutrition/Diet Outcome: Progressing Towards Goal 
Goal: Discharge Planning Outcome: Progressing Towards Goal 
Goal: Medications Outcome: Progressing Towards Goal 
Goal: Respiratory Outcome: Progressing Towards Goal 
Goal: Treatments/Interventions/Procedures Outcome: Progressing Towards Goal 
Goal: Psychosocial 
Outcome: Progressing Towards Goal 
Goal: *Optimal pain control at patient's stated goal 
Outcome: Progressing Towards Goal 
Goal: *Hemodynamically stable Outcome: Progressing Towards Goal 
Goal: *Demonstrates progressive activity Outcome: Progressing Towards Goal 
Goal: *Tolerating diet Outcome: Progressing Towards Goal 
  
Problem: AMI: Day 4 Goal: Off Pathway (Use only if patient is Off Pathway) Outcome: Progressing Towards Goal 
Goal: Activity/Safety Outcome: Progressing Towards Goal 
Goal: Diagnostic Test/Procedures Outcome: Progressing Towards Goal 
Goal: Nutrition/Diet Outcome: Progressing Towards Goal 
Goal: Discharge Planning Outcome: Progressing Towards Goal 
Goal: Medications Outcome: Progressing Towards Goal 
Goal: Respiratory Outcome: Progressing Towards Goal 
Goal: Treatments/Interventions/Procedures Outcome: Progressing Towards Goal 
Goal: Psychosocial 
Outcome: Progressing Towards Goal 
Goal: *Optimal pain control at patient's stated goal 
Outcome: Progressing Towards Goal 
Goal: *Hemodynamically stable Outcome: Progressing Towards Goal 
 Goal: *Demonstrates progressive activity Outcome: Progressing Towards Goal 
Goal: *Tolerating diet Outcome: Progressing Towards Goal 
  
Problem: AMI: Day 5 Goal: Off Pathway (Use only if patient is Off Pathway) Outcome: Progressing Towards Goal 
Goal: Activity/Safety Outcome: Progressing Towards Goal 
Goal: Diagnostic Test/Procedures Outcome: Progressing Towards Goal 
Goal: Nutrition/Diet Outcome: Progressing Towards Goal 
Goal: Discharge Planning Outcome: Progressing Towards Goal 
Goal: Medications Outcome: Progressing Towards Goal 
Goal: Treatments/Interventions/Procedures Outcome: Progressing Towards Goal 
Goal: Psychosocial 
Outcome: Progressing Towards Goal 
  
Problem: AMI: Discharge Outcomes Goal: *Demonstrates ability to perform prescribed activity without shortness of breath or discomfort Outcome: Progressing Towards Goal 
Goal: *Verbalizes understanding and describes prescribed diet Outcome: Progressing Towards Goal 
Goal: *Describes follow-up/return visits to physicians Outcome: Progressing Towards Goal 
Goal: *Describes home care/support arrangements established based on need Outcome: Progressing Towards Goal 
Goal: *Anxiety reduced or absent Outcome: Progressing Towards Goal 
Goal: *Understands and describes signs and symptoms to report to providers(Stroke Metric) Outcome: Progressing Towards Goal 
Goal: *Verbalizes name, dosage, time, side effects, and number of days to continue medications Outcome: Progressing Towards Goal 
  
Problem: Patient Education: Go to Patient Education Activity Goal: Patient/Family Education Outcome: Progressing Towards Goal 
  
Problem: Heart Failure: Day 1 Goal: Off Pathway (Use only if patient is Off Pathway) Outcome: Progressing Towards Goal 
Goal: Activity/Safety Outcome: Progressing Towards Goal 
Goal: Consults, if ordered Outcome: Progressing Towards Goal 
Goal: Diagnostic Test/Procedures Outcome: Progressing Towards Goal 
 Goal: Nutrition/Diet Outcome: Progressing Towards Goal 
Goal: Discharge Planning Outcome: Progressing Towards Goal 
Goal: Medications Outcome: Progressing Towards Goal 
Goal: Respiratory Outcome: Progressing Towards Goal 
Goal: Treatments/Interventions/Procedures Outcome: Progressing Towards Goal 
Goal: Psychosocial 
Outcome: Progressing Towards Goal 
Goal: *Oxygen saturation within defined limits Outcome: Progressing Towards Goal 
Goal: *Hemodynamically stable Outcome: Progressing Towards Goal 
Goal: *Optimal pain control at patient's stated goal 
Outcome: Progressing Towards Goal 
Goal: *Anxiety reduced or absent Outcome: Progressing Towards Goal 
  
Problem: Heart Failure: Day 2 Goal: Off Pathway (Use only if patient is Off Pathway) Outcome: Progressing Towards Goal 
Goal: Activity/Safety Outcome: Progressing Towards Goal 
Goal: Consults, if ordered Outcome: Progressing Towards Goal 
Goal: Diagnostic Test/Procedures Outcome: Progressing Towards Goal 
Goal: Nutrition/Diet Outcome: Progressing Towards Goal 
Goal: Discharge Planning Outcome: Progressing Towards Goal 
Goal: Medications Outcome: Progressing Towards Goal 
Goal: Respiratory Outcome: Progressing Towards Goal 
Goal: Treatments/Interventions/Procedures Outcome: Progressing Towards Goal 
Goal: Psychosocial 
Outcome: Progressing Towards Goal 
Goal: *Oxygen saturation within defined limits Outcome: Progressing Towards Goal 
Goal: *Hemodynamically stable Outcome: Progressing Towards Goal 
Goal: *Optimal pain control at patient's stated goal 
Outcome: Progressing Towards Goal 
Goal: *Anxiety reduced or absent Outcome: Progressing Towards Goal 
Goal: *Demonstrates progressive activity Outcome: Progressing Towards Goal 
  
Problem: Heart Failure: Day 3 Goal: Off Pathway (Use only if patient is Off Pathway) Outcome: Progressing Towards Goal 
Goal: Activity/Safety Outcome: Progressing Towards Goal 
Goal: Diagnostic Test/Procedures Outcome: Progressing Towards Goal 
Goal: Nutrition/Diet Outcome: Progressing Towards Goal 
Goal: Discharge Planning Outcome: Progressing Towards Goal 
Goal: Medications Outcome: Progressing Towards Goal 
Goal: Respiratory Outcome: Progressing Towards Goal 
Goal: Treatments/Interventions/Procedures Outcome: Progressing Towards Goal 
Goal: Psychosocial 
Outcome: Progressing Towards Goal 
Goal: *Oxygen saturation within defined limits Outcome: Progressing Towards Goal 
Goal: *Hemodynamically stable Outcome: Progressing Towards Goal 
Goal: *Optimal pain control at patient's stated goal 
Outcome: Progressing Towards Goal 
Goal: *Anxiety reduced or absent Outcome: Progressing Towards Goal 
Goal: *Demonstrates progressive activity Outcome: Progressing Towards Goal 
  
Problem: Heart Failure: Day 4 Goal: Off Pathway (Use only if patient is Off Pathway) Outcome: Progressing Towards Goal 
Goal: Activity/Safety Outcome: Progressing Towards Goal 
Goal: Diagnostic Test/Procedures Outcome: Progressing Towards Goal 
Goal: Nutrition/Diet Outcome: Progressing Towards Goal 
Goal: Discharge Planning Outcome: Progressing Towards Goal 
Goal: Medications Outcome: Progressing Towards Goal 
Goal: Respiratory Outcome: Progressing Towards Goal 
Goal: Treatments/Interventions/Procedures Outcome: Progressing Towards Goal 
Goal: Psychosocial 
Outcome: Progressing Towards Goal 
Goal: *Oxygen saturation within defined limits Outcome: Progressing Towards Goal 
Goal: *Hemodynamically stable Outcome: Progressing Towards Goal 
Goal: *Optimal pain control at patient's stated goal 
Outcome: Progressing Towards Goal 
Goal: *Anxiety reduced or absent Outcome: Progressing Towards Goal 
Goal: *Demonstrates progressive activity Outcome: Progressing Towards Goal 
  
Problem: Heart Failure: Day 5 Goal: Off Pathway (Use only if patient is Off Pathway) Outcome: Progressing Towards Goal 
Goal: Activity/Safety Outcome: Progressing Towards Goal 
Goal: Diagnostic Test/Procedures Outcome: Progressing Towards Goal 
Goal: Nutrition/Diet Outcome: Progressing Towards Goal 
Goal: Discharge Planning Outcome: Progressing Towards Goal 
Goal: Medications Outcome: Progressing Towards Goal 
Goal: Respiratory Outcome: Progressing Towards Goal 
Goal: Treatments/Interventions/Procedures Outcome: Progressing Towards Goal 
Goal: Psychosocial 
Outcome: Progressing Towards Goal 
  
Problem: Heart Failure: Discharge Outcomes Goal: *Demonstrates ability to perform prescribed activity without shortness of breath or discomfort Outcome: Progressing Towards Goal 
Goal: *Left ventricular function assessment completed prior to or during stay, or planned for post-discharge Outcome: Progressing Towards Goal 
Goal: *ACEI prescribed if LVEF less than 40% and no contraindications or ARB prescribed Outcome: Progressing Towards Goal 
Goal: *Verbalizes understanding and describes prescribed diet Outcome: Progressing Towards Goal 
Goal: *Verbalizes understanding/describes prescribed medications Outcome: Progressing Towards Goal 
Goal: *Describes available resources and support systems Description: (eg: Home Health, Palliative Care, [facility] [employer]) Outcome: Progressing Towards Goal 
Goal: *Describes smoking cessation resources Outcome: Progressing Towards Goal 
Goal: *Understands and describes signs and symptoms to report to providers(Stroke Metric) Outcome: Progressing Towards Goal 
Goal: *Describes/verbalizes understanding of follow-up/return appt Description: (eg: to physicians, diabetes treatment coordinator, and other resources Outcome: Progressing Towards Goal 
Goal: *Describes importance of continuing daily weights and changes to report to physician Outcome: Progressing Towards Goal 
  
Problem: Infection - Risk of, Central Venous Catheter-Associated Bloodstream Infection Goal: *Absence of infection signs and symptoms Outcome: Progressing Towards Goal 
  
Problem: Patient Education: Go to Patient Education Activity Goal: Patient/Family Education Outcome: Progressing Towards Goal 
  
Problem: Infection - Risk of, Urinary Catheter-Associated Urinary Tract Infection Goal: *Absence of infection signs and symptoms Outcome: Progressing Towards Goal 
  
Problem: Patient Education: Go to Patient Education Activity Goal: Patient/Family Education Outcome: Progressing Towards Goal 
  
Problem: Hypotension Goal: *Blood pressure within specified parameters Outcome: Progressing Towards Goal 
Goal: *Fluid volume balance Outcome: Progressing Towards Goal 
Goal: *Labs within defined limits Outcome: Progressing Towards Goal 
  
Problem: Patient Education: Go to Patient Education Activity Goal: Patient/Family Education Outcome: Progressing Towards Goal 
  
Problem: Pressure Injury - Risk of 
Goal: *Prevention of pressure injury Description: Document Calderon Scale and appropriate interventions in the flowsheet. Outcome: Progressing Towards Goal 
Note: Pressure Injury Interventions: 
Sensory Interventions: Assess changes in LOC, Assess need for specialty bed, Float heels, Discuss PT/OT consult with provider, Keep linens dry and wrinkle-free, Minimize linen layers, Pressure redistribution bed/mattress (bed type), Turn and reposition approx. every two hours (pillows and wedges if needed) Moisture Interventions: Absorbent underpads, Apply protective barrier, creams and emollients, Check for incontinence Q2 hours and as needed, Contain wound drainage, Internal/External urinary devices, Minimize layers Activity Interventions: Assess need for specialty bed, Pressure redistribution bed/mattress(bed type) Mobility Interventions: Assess need for specialty bed, Float heels, Pressure redistribution bed/mattress (bed type), Turn and reposition approx. every two hours(pillow and wedges) Nutrition Interventions: Document food/fluid/supplement intake, Offer support with meals,snacks and hydration Friction and Shear Interventions: Apply protective barrier, creams and emollients, Foam dressings/transparent film/skin sealants, Minimize layers, Transferring/repositioning devices, Lift sheet Problem: Patient Education: Go to Patient Education Activity Goal: Patient/Family Education Outcome: Progressing Towards Goal 
  
Problem: Chronic Renal Failure Goal: *Fluid and electrolytes stabilized Outcome: Progressing Towards Goal 
  
Problem: Patient Education: Go to Patient Education Activity Goal: Patient/Family Education Outcome: Progressing Towards Goal

## 2020-08-18 NOTE — PROGRESS NOTES
SPEECH PATHOLOGY BEDSIDE SWALLOW EVALUATION/DISCHARGE Patient: Tavo Hinson (82 y.o. female) Date: 8/18/2020 Primary Diagnosis: Heart failure ([facility] [I50.9] Precautions: n/a ASSESSMENT : 
Patient presents without any difficulty nor overt s/s of aspiration on this date. Given pt with hx of rheumatoid arthritis, DM, and GERD, pt is at elevated risk for post-prandial aspiration thus could consider medical management of reflux. Given no overt s/s of aspiration of bedside, recommend diet as outlined below. Suspect pt at baseline swallow function. No further SLP needs. PLAN : 
Recommendations: 
--Regular diet/thin liquids 
--Could consider medical management of reflux  
--General aspiration precautions --Upright during meals --Vigilant oral care 
--No further SLP needs Discharge Recommendations: To Be Determined SUBJECTIVE:  
Patient was pleasant and participative. OBJECTIVE:  
 
Past Medical History:  
Diagnosis Date  Acute on chronic systolic HF (heart failure) ([facility] 5/19/2018  Age-related osteoporosis without current pathological fracture 9/2/2009  Anemia 9/2/2009  Asymptomatic hyperuricemia 2/16/2017  Breast cancer ([facility]  CAD (coronary artery disease) 6/21/2018  Carotid bruit 8/31/2011  CHF (congestive heart failure) ([facility]  CKD (chronic kidney disease) stage 3, GFR 30-59 ml/min ([facility]) 10/25/2017  Colon cancer ([facility] 9/2/2009  
 surgery/chemo  Colon cancer ([facility] 9/2/2009  COVID-19   
 DM (diabetes mellitus) ([facility] 9/2/2009  GERD (gastroesophageal reflux disease)  H/O: CVA 9/2/2009  
 slight l sided weakness  Heart attack ([address]  HTN, goal below 140/90 9/2/2009  Hypothyroid 9/2/2009  Ill-defined condition   
 seasonal allergies  Long-term use of immunosuppressant medication 11/21/2016  Metastatic breast cancer ([facility] 6/1/2018  Microalbuminuria 9/2/2009  Osteoporosis 9/2/2009  Other and unspecified hyperlipidemia 1/27/2010  PAD (peripheral artery disease) ([facility] 9/7/2011  Primary osteoarthritis of both knees 9/28/2016  Rheumatoid arthritis involving ankle ([facility] 9/28/2016  Rheumatoid arthritis(714.0) 9/2/2009  Seropositive rheumatoid arthritis of multiple sites ([facility] 9/28/2016  Statin intolerance 12/21/2016  Type 2 diabetes mellitus with neurologic complication, with long-term current use of insulin ([facility] 9/2/2009  Type 2 diabetes with nephropathy ([facility] 8/20/2018  Unspecified essential hypertension 9/2/2009  Vitamin D deficiency 6/16/2017  Weakness due to cerebrovascular accident Past Surgical History:  
Procedure Laterality Date  HX COLECTOMY  HX HYSTERECTOMY  HX OTHER SURGICAL    
 colonoscopies numerous since 1993 Prior Level of Function/Home Situation:  
Home Situation Home Environment: Private residence # Steps to Enter: 5 One/Two Story Residence: Two story # of Interior Steps: (bedroom on first floor) Living Alone: No 
Support Systems: Family member(s), Child(madi) Patient Expects to be Discharged to[de-identified] Private residence Current DME Used/Available at Home: Walker, rolling Diet prior to admission: Regular diet/thin liquids Current Diet:  Clear liquid diet Cognitive and Communication Status: 
Neurologic State: Alert Orientation Level: Oriented to person, Oriented to place, Disoriented to time, Disoriented to situation Cognition: Follows commands Perseveration: No perseveration noted Safety/Judgement: Not assessed Oral Assessment: 
Oral Assessment Labial: No impairment Dentition: Intact Oral Hygiene: oral mucosa moist and clear of secretions Lingual: No impairment Velum: No impairment Mandible: No impairment P.O. Trials: 
Patient Position: upright in bed Vocal quality prior to P.O.: No impairment Consistency Presented: Thin liquid; Solid How Presented: SLP-fed/presented;Self-fed/presented;Straw Bolus Acceptance: No impairment Bolus Formation/Control: No impairment Propulsion: No impairment Oral Residue: None Initiation of Swallow: No impairment Laryngeal Elevation: Functional 
Aspiration Signs/Symptoms: None Pharyngeal Phase Characteristics: No impairment, issues, or problems Oral Phase Severity: No impairment Pharyngeal Phase Severity : No impairment NOMS:  
The NOMS functional outcome measure was used to quantify this patient's level of swallowing impairment. Based on the NOMS, the patient was determined to be at level 7 for swallow function NOMS Swallowing Levels: 
Level 1 (CN): NPO Level 2 (CM): NPO but takes consistency in therapy Level 3 (CL): Takes less than 50% of nutrition p.o. and continues with nonoral feedings; and/or safe with mod cues; and/or max diet restriction Level 4 (CK): Safe swallow but needs mod cues; and/or mod diet restriction; and/or still requires some nonoral feeding/supplements Level 5 (CJ): Safe swallow with min diet restriction; and/or needs min cues Level 6 (CI): Independent with p.o.; rare cues; usually self cues; may need to avoid some foods or needs extra time Level 7 (CH): Independent for all p.o. ERI. (2003). National Outcomes Measurement System (NOMS): Adult Speech-Language Pathology User's Guide. Pain: 
Pain Scale 1: Numeric (0 - 10) Pain Intensity 1: 0 Pain Location 1: Generalized After treatment:  
Patient left in no apparent distress in bed, Call bell within reach, Nursing notified and Caregiver / family present COMMUNICATION/EDUCATION:  
 
The patient's plan of care including recommendations, planned interventions, and recommended diet changes were discussed with: Registered nurse. Thank you for this referral. 
TREVOR Randolph S.Ed. Student SLP Time Calculation: 21 mins Regarding student involvement in patient care: A student participated in this treatment session.  Per CMS Medicare statements and ERI guidelines I certify that the following was true: 1. I was present and directly observed the entire session. 2. I made all skilled judgments and clinical decisions regarding care. 3. I am the practitioner responsible for assessment, treatment, and documentation.

## 2020-08-18 NOTE — INTERDISCIPLINARY ROUNDS
Multidisciplinary rounds were held August 18, 2020. Today's plan/goal includes (but not limited to): Decrease pressure support, wean O2 requirement

## 2020-08-18 NOTE — PROGRESS NOTES
Problem: Self Care Deficits Care Plan (Adult) Goal: *Acute Goals and Plan of Care (Insert Text) Description: FUNCTIONAL STATUS PRIOR TO ADMISSION: Patient with discharge 8/16/20 and home ~10 hours before return to ER. Prior to that patient was modified independent using a walker for functional mobility. HOME SUPPORT: The patient lived with son, also receives assistance from caregivers (one for several hours in AM, one for several hours in PM). Per patient, they provide assistance with bathing (shower transfers, cleaning/rinsing), dressing, and toileting (occasionally hygiene and clothing management). Increased assistance needed recently. Occupational Therapy Goals Initiated 8/18/2020 1. Patient will perform standing grooming with supervision/set-up using RW within 7 day(s). 2.  Patient will perform toilet transfer with CGA using RW within 7 day(s). 3.  Patient will perform toileting with CGA using RW prn within 7 day(s). 4.  Patient will tolerate 10 minutes standing activity with supervision/set-up using RW prn within 7 day(s). Outcome: Not Met OCCUPATIONAL THERAPY EVALUATION Patient: Fabiano Tello (82 y.o. female) Date: 8/18/2020 Primary Diagnosis: Heart failure ([facility] [I50.9] Precautions: fall, skin ASSESSMENT Based on the objective data described below, the patient presents s/p code CVA, code STEMI and code blue last night. RN cleared for out of bed and participation in therapy. Patient with good attention to task, agreeable to out of bed and transfer with min assist of 2, noted at baseline uses walker. Patient with good sitting balance, performed grooming in chair with set-up. Complaint of left LE pain and requested Tylenol which was given during assessment. At this time feel she is likely near her baseline and has a good support system at home.   
 
Current Level of Function Impacting Discharge (ADLs/self-care): max assist LE ADL's, min assist of 2 for stand pivot transfer Functional Outcome Measure: The patient scored 35/100 on the Barthel Index outcome measure Other factors to consider for discharge: has very supportive son and assist with ADL's at baseline Patient will benefit from skilled therapy intervention to address the above noted impairments. PLAN : 
Recommendations and Planned Interventions: self care training, functional mobility training, therapeutic exercise, balance training, therapeutic activities, endurance activities, patient education, home safety training, and family training/education Frequency/Duration: Patient will be followed by occupational therapy 5 times a week to address goals. Recommendation for discharge: (in order for the patient to meet his/her long term goals) Occupational therapy at least 2 days/week in the home AND ensure assist and/or supervision for safety with Functional transfers This discharge recommendation: 
Has not yet been discussed the attending provider and/or case management IF patient discharges home will need the following DME:   
 
 
SUBJECTIVE:  
Patient stated I need my tylenol for my leg.  OBJECTIVE DATA SUMMARY:  
HISTORY:  
Past Medical History:  
Diagnosis Date  Acute on chronic systolic HF (heart failure) ([facility] 5/19/2018  Age-related osteoporosis without current pathological fracture 9/2/2009  Anemia 9/2/2009  Asymptomatic hyperuricemia 2/16/2017  Breast cancer ([facility]  CAD (coronary artery disease) 6/21/2018  Carotid bruit 8/31/2011  CHF (congestive heart failure) ([facility]  CKD (chronic kidney disease) stage 3, GFR 30-59 ml/min ([facility]) 10/25/2017  Colon cancer ([address] 9/2/2009  
 surgery/chemo  Colon cancer ([address] 9/2/2009  COVID-19   
 DM (diabetes mellitus) ([facility] 9/2/2009  GERD (gastroesophageal reflux disease)  H/O: CVA 9/2/2009  
 slight l sided weakness  Heart attack ([address]  HTN, goal below 140/90 9/2/2009  Hypothyroid 9/2/2009  Ill-defined condition   
 seasonal allergies  Long-term use of immunosuppressant medication 11/21/2016  Metastatic breast cancer ([facility] 6/1/2018  Microalbuminuria 9/2/2009  Osteoporosis 9/2/2009  Other and unspecified hyperlipidemia 1/27/2010  PAD (peripheral artery disease) ([facility] 9/7/2011  Primary osteoarthritis of both knees 9/28/2016  Rheumatoid arthritis involving ankle ([facility] 9/28/2016  Rheumatoid arthritis(714.0) 9/2/2009  Seropositive rheumatoid arthritis of multiple sites ([facility] 9/28/2016  Statin intolerance 12/21/2016  Type 2 diabetes mellitus with neurologic complication, with long-term current use of insulin ([facility] 9/2/2009  Type 2 diabetes with nephropathy ([facility] 8/20/2018  Unspecified essential hypertension 9/2/2009  Vitamin D deficiency 6/16/2017  Weakness due to cerebrovascular accident Past Surgical History:  
Procedure Laterality Date  HX COLECTOMY  HX HYSTERECTOMY  HX OTHER SURGICAL    
 colonoscopies numerous since 1993 Expanded or extensive additional review of patient history:  
 
Home Situation Home Environment: Private residence # Steps to Enter: 5 One/Two Story Residence: Two story # of Interior Steps: (bedroom on first floor) Living Alone: No 
Support Systems: Family member(s), Child(madi) Patient Expects to be Discharged to[de-identified] Private residence Current DME Used/Available at Home: Walker, rolling Hand dominance: Right EXAMINATION OF PERFORMANCE DEFICITS: 
Cognitive/Behavioral Status: 
Neurologic State: Alert Orientation Level: Oriented to person;Oriented to place; Disoriented to time;Disoriented to situation Cognition: Follows commands Perception: Appears intact Perseveration: No perseveration noted Safety/Judgement: Awareness of environment; Fall prevention;Home safety; Insight into deficits Skin: noted dressing right anterior shin Edema: none noted Hearing: Auditory Auditory Impairment: Hard of hearing, bilateral, Hearing aid(s) Hearing Aids/Status: Bilateral, Functioning Vision/Perceptual:   
    
    
    
  
    
    
  
 
Range of Motion: 
AROM: Generally decreased, functional 
  
  
  
  
  
  
  
 
Strength: 
Strength: Generally decreased, functional 
  
  
  
  
 
Coordination: 
Coordination: Generally decreased, functional 
Fine Motor Skills-Upper: Left Intact; Right Intact Gross Motor Skills-Upper: Left Intact; Right Intact Balance: 
Sitting: Intact Sitting - Static: Good (unsupported) Sitting - Dynamic: Good (unsupported) Standing: Impaired Standing - Static: Fair;Unsupported Standing - Dynamic : Not tested Functional Mobility and Transfers for ADLs: 
Bed Mobility: 
Supine to Sit: Minimum assistance; Additional time Scooting: Moderate assistance Transfers: 
Sit to Stand: Minimum assistance;Assist x2 Stand to Sit: Moderate assistance;Assist x1 Bed to Chair: Minimum assistance;Assist x2 Toilet Transfer : Minimum assistance; Moderate assistance;Assist x1(stand pivot) ADL Assessment: 
Feeding: Supervision;Setup Oral Facial Hygiene/Grooming: Setup Bathing: Moderate assistance Upper Body Dressing: Contact guard assistance Lower Body Dressing: Maximum assistance Toileting: Moderate assistance ADL Intervention and task modifications: 
  
Educated on role of OT, benefit of increased time out of bed, use of call bell, transfer to edge of bed min assist, good sitting balance, hand held assist of 2 to chair, cues for alignment, sit to stand from chair with mod assist of 1. Performed grooming seated in chair with set-up only Patient instructed and indicated understanding the benefits of maintaining activity tolerance, functional mobility, and independence with self care tasks during acute stay  to ensure safe return home and to baseline. Encouraged patient to increase frequency and duration OOB, be out of bed for all meals, perform daily ADLs (as approved by RN/MD regarding bathing etc), and performing functional mobility to/from bathroom. Cognitive Retraining Safety/Judgement: Awareness of environment; Fall prevention;Home safety; Insight into deficits Functional Measure: 
Barthel Index: 
 
Bathin Bladder: 5 Bowels: 5 Groomin Dressin Feedin Mobility: 0 Stairs: 0 Toilet Use: 5 Transfer (Bed to Chair and Back): 10 Total: 35/100 The Barthel ADL Index: Guidelines 1. The index should be used as a record of what a patient does, not as a record of what a patient could do. 2. The main aim is to establish degree of independence from any help, physical or verbal, however minor and for whatever reason. 3. The need for supervision renders the patient not independent. 4. A patient's performance should be established using the best available evidence. Asking the patient, friends/relatives and nurses are the usual sources, but direct observation and common sense are also important. However direct testing is not needed. 5. Usually the patient's performance over the preceding 24-48 hours is important, but occasionally longer periods will be relevant. 6. Middle categories imply that the patient supplies over 50 per cent of the effort. 7. Use of aids to be independent is allowed. Yarely Ramon., Barthel, D.W. (1163). Functional evaluation: the Barthel Index. [facility] (14)2. TIAGO Floyd, Andrews Vance., Danisha Wilson., [place], [address] [place] [address] (). Measuring the change indisability after inpatient rehabilitation; comparison of the responsiveness of the Barthel Index and Functional [place] Measure. Journal of Neurology, Neurosurgery, and Psychiatry, 66(4), 135-921.  
BENJI Cheng.ELROY, ANDREA Ramos, Kareem Mora M.A. (2004.) Assessment of post-stroke quality of life in cost-effectiveness studies: The usefulness of the Barthel Index and the EuroQoL-5D. [facility], 13, 920-30 Occupational Therapy Evaluation Charge Determination History Examination Decision-Making LOW Complexity : Brief history review  LOW Complexity : 1-3 performance deficits relating to physical, cognitive , or psychosocial skils that result in activity limitations and / or participation restrictions  MEDIUM Complexity : Patient may present with comorbidities that affect occupational performnce. Miniml to moderate modification of tasks or assistance (eg, physical or verbal ) with assesment(s) is necessary to enable patient to complete evaluation Based on the above components, the patient evaluation is determined to be of the following complexity level: LOW Pain Rating: 
Left LE, not rated, Tylenol requested and RN informed and administered Activity Tolerance:  
Fair Please refer to the flowsheet for vital signs taken during this treatment. After treatment patient left in no apparent distress:   
Sitting in chair and Call bell within reach COMMUNICATION/EDUCATION:  
The patients plan of care was discussed with: Registered nurse. Home safety education was provided and the patient/caregiver indicated understanding. and Patient/family have participated as able in goal setting and plan of care. This patients plan of care is appropriate for delegation to [facility]. Thank you for this referral. 
Ban Jim, OTR/L Time Calculation: 43 mins

## 2020-08-18 NOTE — H&P
[facility] 
 
ICU TEAM History and Physical 
 
Name: Erma Ness : 1935 MRN: [id number] Date: 2020 Assessment:  
 
ICU Problems: 1. Shock 2. Anemia 3. STEMI 4. CAD 5. UTI 6. CHF 7. DM2 
8. Breast cancer 9. Colon Cancer ICU Comprehensive Plan of Care:  
 
Plans for this Shift:  
1. Admit to ICU 2. Stat TTE 
3. Continue vasopressor support as needed 4. Place Alford, UA and culture 5. SBP Goal of: > 90 mmHg, MAP Goal of: > 65 mmHg 6. Norepinephrine - For above SBP/MAP goals 7. IVFs: Minimize IVF 8. Transfusion Trigger (Hgb): <8 g/dL 9. Respiratory Goals: 
a. Head of bed > 30 degrees 
b. Incentive spirometry 10. Pulmonary toilet: Duo-Nebs 11. SpO2 Goal: > 92% 12. Keep K>4; Mg>2 13. PT/OT: Consult in am  
14. Discussed Plan of Care/Code Status: Full Code 15. Appreciate Consultants Input: cardiology 16. Discussed Care Plan with Bedside RN 
17. Documentation of Current Medications 18. Rest of Plan Below: 
 
F - Feeding:  No 
A - Analgesia: None S - Sedation: None T - DVT Prophylaxis: Plavix, SCD's or Sequential Compression Device and ASA 81 mg BID H - Head of Bed: > 30 Degrees U - Ulcer Prophylaxis: Not at this time G - Glycemic Control: Insulin S - Spontaneous Breathing Trial: N/A 
B - Bowel Regimen: None needed at this time I - Indwelling Catheter: 
 Tubes: None Lines: Peripheral IV and [employer] Drains: Alford Catheter D - De-escalation of Antibiotics:  
 
Subjective:  
Progress Note: 2020 Reason for ICU Admission: s/p Cardiac arrest  
 
HPI: 81 y/o F w hx of CHF, CAD, CKD, DM, GERD, breast cancer presents with SOB and lethargy after being discharged from the hospital less than 24 hours prior. Her previous admission was for acute hypoxic respiratory failure in the setting of volume overload and new reduction in her EF from 40% to 25%. She was placed on BiPAP at the time and diuresed.  She was discharged on an increased dose of lasix from her prior.  
  
Per report from the family, she had a low grade temperature at home and her SpO2 on their home device measured anywhere from 82-88%. On arrival to the ED she was saturating 100% on room air. She had a chest CT obtained here 8/17 which showed b/l mild-mod pleural effusions and a mod sized circumferential pericardial effusion (known from previous TTE).   
I initially was consulted on Ms. Ledesma for hypotension earlier this am. In the interim, she was evaluated by cardiology - coreg continued and 1 dose lasix given. Unclear volume status as patient was in ED boarding all day. On arrival to the floor, a rapid response was called, followed shortly by code stroke and code blue. She has 1 round CPR w 1x epi, 1x bicarb, and was intubated. On my arrival to the room, patient was intubated and expressing that the ETT be removed. Her BP was 150/80 and O2 sat was 100, she was neuro intact and following complex commands. I extubated her at the bedside and she remained stable on 2 lpm NC. Following extubation her pressures dipped and I gave her 30 mcg epinephrine in divided doses while waiting for phenylephrine gtt to start. She remains confused on arrival to the ICU. Unclear what precipitated her deterioration this afternoon. With the known effusion and lasix dose given earlier, we elected to give a bolus of IVF. She was noted to be anemic and I ordered 1x PRBC. Given her hx of extensive CAD, she certainly suffered myocardial hypoperfusion through this event and ECG on arrival to the ICU was concerning for STEMI and code stemi was called. Cardiology agreed that STAT TTE would be helpful in determining plan of care as they felt cath would unlikely be of any benefit. Will obtain stat TTE and if continued decline in systolic function will discuss additional therapy options with family and cardiology. Had extensive discussion with both of her sons about goals of care and expected trajectories following her cardiac arrest. She is to be a full code per her stated wishes from earlier during this hospitalization. Addendum: thick, purulent urine noted on placement of Alford. Patient has had numerous Klebsiella UTIs this year and she is already on ceftriaxone. Will continue current therapy and send UA/Cx. Active Problem List:  
 
Problem List  Date Reviewed: 7/23/2020 Codes Class Heart failure ([facility] ICD-10-CM: I50.9 ICD-9-CM: 428.9 CHF (congestive heart failure) ([facility]) ICD-10-CM: I50.9 ICD-9-CM: 428.0 Bacteremia due to Gram-negative bacteria ICD-10-CM: R78.81 ICD-9-CM: 790.7, 041.85   
   
 UTI (urinary tract infection) ICD-10-CM: N39.0 ICD-9-CM: 599.0 Septic shock ([facility]) ICD-10-CM: A41.9, R65.21 ICD-9-CM: 038.9, 785.52, 995.92 Dizziness ICD-10-CM: L03 ICD-9-CM: 780.4 Type 2 diabetes with nephropathy ([facility]) ICD-10-CM: E11.21 
ICD-9-CM: 250.40, 583.81   
   
 CAD (coronary artery disease) ICD-10-CM: I25.10 ICD-9-CM: 414.00 Metastatic breast cancer ([facility] ICD-10-CM: X48.232 ICD-9-CM: 174.9 CKD (chronic kidney disease) stage 3, GFR 30-59 ml/min ([facility]) ICD-10-CM: N18.3 ICD-9-CM: 911. 3 Vitamin D deficiency ICD-10-CM: E55.9 ICD-9-CM: 268.9 Asymptomatic hyperuricemia ICD-10-CM: E79.0 ICD-9-CM: 790.6 Statin intolerance ICD-10-CM: Z78.9 ICD-9-CM: 995.27 Long-term use of immunosuppressant medication ICD-10-CM: Z79.899 ICD-9-CM: V58.69 Seropositive rheumatoid arthritis of multiple sites [facility]) ICD-10-CM: M05.79 ICD-9-CM: 714.0 Primary osteoarthritis of both knees ICD-10-CM: M17.0 ICD-9-CM: 715.16 Weakness due to cerebrovascular accident ICD-10-CM: TPQ6474 ICD-9-CM: VOS2689 PAD (peripheral artery disease) ([facility]) ICD-10-CM: I73.9 ICD-9-CM: 443.9 Carotid bruit ICD-10-CM: R09.89 ICD-9-CM: 457. 9 Hyperlipidemia ICD-10-CM: E78.5 ICD-9-CM: 272.4 Type 2 diabetes mellitus with neurologic complication, with long-term current use of insulin (HCC) ICD-10-CM: E11.49, Z79.4 ICD-9-CM: 250.60, V58.67 Colon cancer [facility]) ICD-10-CM: C18.9 ICD-9-CM: 153.9 Age-related osteoporosis without current pathological fracture ICD-10-CM: M81.0 ICD-9-CM: 733.01   
   
 HTN, goal below 140/90 ICD-10-CM: I10 
ICD-9-CM: 401.9 Anemia ICD-10-CM: D64.9 ICD-9-CM: 285.9 Past Medical History:  
 
 has a past medical history of Acute on chronic systolic HF (heart failure) ([facility] (5/19/2018), Age-related osteoporosis without current pathological fracture (9/2/2009), Anemia (9/2/2009), Asymptomatic hyperuricemia (2/16/2017), Breast cancer ([facility] CAD (coronary artery disease) (6/21/2018), Carotid bruit (8/31/2011), CHF (congestive heart failure) ([facility] CKD (chronic kidney disease) stage 3, GFR 30-59 ml/min ([facility] (10/25/2017), Colon cancer ([facility] (9/2/2009), Colon cancer ([facility] (9/2/2009), COVID-19, DM (diabetes mellitus) ([facility] (9/2/2009), GERD (gastroesophageal reflux disease), H/O: CVA (9/2/2009), Heart attack ([facility] HTN, goal below 140/90 (9/2/2009), Hypothyroid (9/2/2009), Ill-defined condition, Long-term use of immunosuppressant medication (11/21/2016), Metastatic breast cancer ([facility] (6/1/2018), Microalbuminuria (9/2/2009), Osteoporosis (9/2/2009), Other and unspecified hyperlipidemia (1/27/2010), PAD (peripheral artery disease) ([facility] (9/7/2011), Primary osteoarthritis of both knees (9/28/2016), Rheumatoid arthritis involving ankle ([facility] (9/28/2016), Rheumatoid arthritis(714.0) (9/2/2009), Seropositive rheumatoid arthritis of multiple sites ([facility] (9/28/2016), Statin intolerance (12/21/2016), Type 2 diabetes mellitus with neurologic complication, with long-term current use of insulin ([facility] (9/2/2009), Type 2 diabetes with nephropathy ([facility] (8/20/2018), Unspecified essential hypertension (9/2/2009), Vitamin D deficiency (6/16/2017), and Weakness due to cerebrovascular accident. Past Surgical History:  
 
 has a past surgical history that includes hx colectomy; hx hysterectomy; and hx other surgical. 
 
Home Medications:  
 
Prior to Admission medications Medication Sig Start Date End Date Taking? Authorizing Provider  
furosemide (LASIX) 40 mg tablet Take 1 Tab by mouth two (2) times a day. 8/16/20  Yes Brigid Kirby, DO  
lisinopriL (PRINIVIL, ZESTRIL) 5 mg tablet Take 1 Tab by mouth daily. 8/17/20  Yes Brigid Kirby, DO  
carvediloL (Coreg) 6.25 mg tablet Take 6.25 mg by mouth two (2) times daily (with meals). Yes Provider, Historical  
clopidogreL (PLAVIX) 75 mg tab TAKE ONE TABLET BY MOUTH DAILY 6/2/20  Yes Jimmy Richmond MD  
cholecalciferol (Vitamin D3) 25 mcg (1,000 unit) cap Take 1,000 Units by mouth daily. Yes Provider, Historical  
glucosam/wesley-msm1/C/bassam/bosw (OSTEO BI-FLEX TRIPLE STRENGTH PO) Take 2 Tabs by mouth daily. Yes Tremayne Jacob MD  
evolocumab (Repatha SureClick) pen injection 740 mg by SubCUTAneous route every fourteen (14) days. Yes Provider, Historical  
insulin aspart U-100 (NOVOLOG FLEXPEN U-100 INSULIN) 100 unit/mL (3 mL) inpn by SubCUTAneous route Before breakfast, lunch, dinner and at bedtime. 2-3 units depending on blood sugar before meals. (Note: son states very sensitive.)   Yes Provider, Historical  
multivitamin (ONE A DAY) tablet Take 1 Tab by mouth daily. Yes Provider, Historical  
insulin degludec (TRESIBA FLEXTOUCH U-100) 100 unit/mL (3 mL) inpn 14 Units by SubCUTAneous route daily. Yes Provider, Historical  
nitroglycerin (NITROSTAT) 0.4 mg SL tablet 1 Tab by SubLINGual route as needed for Chest Pain. Up to 3 doses.  
Patient taking differently: 1 Tab by SubLINGual route every five (5) minutes as needed for Chest Pain. Up to 3 doses. 18  Yes Kishor San MD  
levothyroxine (SYNTHROID) 88 mcg tablet Take 88 mcg by mouth Daily (before breakfast). Yes Provider, Historical  
aspirin delayed-release 81 mg tablet Take 81 mg by mouth daily. Yes Provider, Historical  
OMEGA-3 FATTY ACIDS/FISH OIL (OMEGA 3 FISH OIL PO) Take  by mouth daily. Yes Provider, Historical  
 
 
Allergies/Social/Family History: Allergies Allergen Reactions  Statins-Hmg-Coa Reductase Inhibitors Other (comments) Intolerant to statins  Sulfa (Sulfonamide Antibiotics) Other (comments)  
  syncope Social History Tobacco Use  Smoking status: Never Smoker  Smokeless tobacco: Never Used Substance Use Topics  Alcohol use: No  
  
Family History Problem Relation Age of Onset  Diabetes Mother  Stroke Mother  Diabetes Sister  Other Sister   
     fell and hit her head -  of this  Diabetes Brother  Cancer Father   
     stomach  Diabetes Sister Review of Systems:  
 
Constitutional: positive for confusion, lethargy Eyes: negative Respiratory: negative Cardiovascular: negative Gastrointestinal: negative Genitourinary:negative Integument/breast: negative Hematologic/lymphatic: negative Musculoskeletal:negative Neurological: positive for memory problems and confusion Behavioral/Psych: negative Objective:  
Vital Signs: 
Visit Vitals BP (!) 140/106 Pulse 92 Temp 98.4 °F (36.9 °C) Resp 23 SpO2 100% O2 Device: Room air Temp (24hrs), Av.5 °F (36.9 °C), Min:98.1 °F (36.7 °C), Max:98.9 °F (37.2 °C) Intake/Output:  
No intake or output data in the 24 hours ending 20 6213 Physical Exam: 
 
General:  Alert, cooperative, well noursished, well developed, appears stated age Eyes:  Sclera anicteric. Pupils equally round and reactive to light. Mouth/Throat: Mucous membranes normal, oral pharynx clear Neck: Supple Lungs:   Clear to auscultation bilaterally, good effort CV:  Regular rate and rhythm,no murmur, click, rub or gallop Abdomen:   Soft, non-tender. bowel sounds normal. non-distended Extremities: No cyanosis or edema Skin: Skin color, texture, turgor normal. no acute rash or lesions Lymph nodes: Cervical and supraclavicular normal  
Musculoskeletal: No swelling or deformity Lines/Devices:  Intact, no erythema, drainage or tenderness Psych: Oriented to self only LABS AND  DATA: Personally reviewed Recent Labs 08/17/20 2121 08/17/20 2010 WBC 8.5 6.6 HGB 7.3* 7.8* HCT 24.0* 25.7*  
 186 Recent Labs 08/17/20 
1935 08/16/20 
2352  08/15/20 
0434 * 131*   < > 133*  
K 5.1 5.0   < > 3.3*  
 99   < > 100 CO2 20* 23   < > 25 BUN 52* 49*   < > 40* CREA 1.90* 1.74*   < > 1.21* * 313*   < > 170* CA 7.9* 8.2*   < > 7.9*  
MG  --   --   --  2.0 PHOS  --   --   --  2.6  
 < > = values in this interval not displayed. Recent Labs 08/16/20 2352 * TP 7.3 ALB 2.3*  
GLOB 5.0* No results for input(s): INR, PTP, APTT, INREXT in the last 72 hours. No results for input(s): PHI, PCO2I, PO2I, FIO2I in the last 72 hours. Recent Labs 08/17/20 1935 08/16/20 2352 TROIQ 2.31* 1.79* MEDS: Reviewed Chest X-Ray: CXR Results  (Last 48 hours) 08/16/20 2358  XR CHEST PORT Final result Impression:  IMPRESSION: Improved edema. Bilateral lower lobe airspace disease with  
effusions. Sclerotic metastases. Narrative:  EXAM: XR CHEST PORT INDICATION: sob, fever COMPARISON: August 12 FINDINGS: A portable AP radiograph of the chest was obtained at 2354 hours. The  
patient is on a cardiac monitor. There is bilateral lower lobe airspace disease  
and bilateral effusions. The pattern of pulmonary congestion has improved. There are sclerotic lesions in the bones as seen previously. ECHO: 
Bedside TTE with limited views post arrest: Severely reduced systolic function (visual estimate 10-15%) with anterior and inferior walls akinetic. Right heart function normal. Mod-Large circumferential pericardial effusion. Multidisciplinary Rounds Completed:  No 
 
ABCDEF Bundle/Checklist Completed: 
Yes SPECIAL EQUIPMENT None DISPOSITION Stay in ICU CRITICAL CARE CONSULTANT NOTE I had a face to face encounter with the patient, reviewed and interpreted patient data including clinical events, labs, images, vital signs, I/O's, and examined patient. I have discussed the case and the plan and management of the patient's care with the consulting services, the bedside nurses and the respiratory therapist.   
 
NOTE OF PERSONAL INVOLVEMENT IN CARE This patient has a high probability of imminent, clinically significant deterioration, which requires the highest level of preparedness to intervene urgently. I participated in the decision-making and personally managed or directed the management of the following life and organ supporting interventions that required my frequent assessment to treat or prevent imminent deterioration. I personally spent 180 minutes of critical care time. This is time spent at this critically ill patient's bedside actively involved in patient care as well as the coordination of care and discussions with the patient's family. This does not include any procedural time which has been billed separately. Jacklyn Nash MD    
Critical Care Medicine [facility] [place]

## 2020-08-19 NOTE — PROGRESS NOTES
1930: Bedside shift report received from Marimar RN. 0400: am labs drawn. 0730: Bedside and Verbal shift change report given to Marimar RN (oncoming nurse) by Carter Castro RN (offgoing nurse). Report included the following information SBAR, Kardex, Intake/Output, MAR, Accordion, Recent Results and Med Rec Status.

## 2020-08-19 NOTE — PROGRESS NOTES
Physical Therapy 15:04 - Chart reviewed, nursing cleared patient for therapy and informed that patient had an episode of increased HR and RR when returning to bed. Checked in on patient, son at bedside. Patient declined to participate stating she is tired and feeling SOB  Observed RR in the 30's. Cues provided for slow PLB and relaxation with improvement to 20. Patient continued to decline out of bed activity. Son requested therapy check back in 30 minutes. Will defer and check back. 15:35 - Checked back with patient for therapy session. Patient once again declined. Therapy will defer today per patient request.  Educated patient in the benefit of sitting up for meals. Patient agreeable to sit up in the chair for dinner with nsg assisting. Therapy will follow up tomorrow for session as appropriate.

## 2020-08-19 NOTE — PROGRESS NOTES
Day #1 of Cefepime Indication:  sepsis/UTI Current regimen:  2 grams Q12hr Abx regimen: vancomycin+Cefepime Recent Labs 20 
4809 20 
0533 20 
2121 WBC 7.8 9.4 8.5 CREA 2.05* 1.95* 1.83* BUN 66* 54* 51* Est CrCl: ~ 15-20 ml/min ml/min; Temp (24hrs), Av.5 °F (36.9 °C), Min:98.2 °F (36.8 °C), Max:98.9 °F (37.2 °C) Cultures:  
 blood NGTD 
 urine  NG 
 
Plan: Change to 2 grams Q24hr for crcl between 11-29 ml/min

## 2020-08-19 NOTE — PROGRESS NOTES
Physician Progress Note Emelyn Chopra 
CSN #:                  [id number] :                       1935 ADMIT DATE:       2020 11:24 PM 
[address] [place] DATE: 
RESPONDING 
PROVIDER #:        LEATHA GREGG DO 
 
 
 
 
QUERY TEXT: 
 
Pt admitted with A/C CHF and Hypotension, with documentation that the 'hypotension is worrisome for infectious etiology'. Septic Shock (from UTI prior to admission) is documented on . If possible, please document in progress notes and discharge summary if Sepsis was present on admission (POA): The medical record reflects the following: 
Risk Factors: pt was just discharged from hospital the day of admission, Clinical Indicators: family repots pt had temp 100.5 and was lethargic prior to coming to ER, was admitted with Hypotension with documentation that the hypotension is worrisome for infection. HR 95, BP 92/58 on admission, WBC 6, Lactic Acid 1.3, Procalcitonin 0.37. UC is pending at this time. Pt had drop in BP down to 67/34, MAP 54, and went into PEA Arrest. Septic Shock (from UTI prior to admission) is documented on . Treatment: Albumin challenge, Rocephin IV, Midrodrine/ Levophed drips, emergent intubation and transferred to ICU. Thank you. If you have any questions, you can e-mail me at: 
[email or web address] 
Options provided: 
-- Yes, Sepsis was present at the time of the order to admit to the hospital 
-- No, Sepsis was not present on admission and developed during the inpatient stay 
-- Other - I will add my own diagnosis -- Disagree - Not applicable / Not valid -- Disagree - Clinically unable to determine / Unknown 
-- Refer to Clinical Documentation Reviewer PROVIDER RESPONSE TEXT: 
 
Yes, Sepsis was present at the time of the order to admit to the hospital. 
 
Query created by: Jluis Tuttle on 2020 5:35 PM 
 
 
Electronically signed by:  Gerardo Tatum DO 2020 11:16 AM

## 2020-08-19 NOTE — PROGRESS NOTES
Cardiology Progress Note Admit Date: 8/16/2020 Admit Diagnosis: Heart failure ([facility] [address] [I50.9] Date: 8/19/2020     Time: 8:49 AM 
Assessment/Plan/Discussion:Cardiology Attending:  
 
Patient seen on the day of progress note and examined  and agree with Advance Practice Provider (BK, NP,PA)  assessment and plans. Chirag Gomez is a 82 y.o. female She is seen in the evening with her son at bedside. She complains of some shortness of breath when she transferred. The nurse did not appreciate any drop in O2 sat with her on room air. She is been given a small dose of IV Lasix. I think she may have forward output failure with fatigue would not necessarily fluid retention  as much as her edema is less and her lungs actually have pretty good air movement without rales. She is limited still in her CHF meds as she is hypotensive relatively speaking but that is improved as she is off of her IV pressors. She continues on Midrin and a modest dose. She is been off the pressors since 9 AM.  She has extensive coronary disease and CHF with significant cancer history including metastatic lesion to the liver. Once able will later use ACE and Coreg, educated son and patient on use of Coreg in CHF. This note was created using voice recognition software. Despite editing, there may be syntax errors. Salina Bobo MD   
 
 
 
Subjective: Chirag Gomez denies chest pain, SOB at rest.  Stated she worked with therapy 2 times yesterday and had a little SOB. Denies any dizziness or lightheadedness. States she is a little hungry this a.m. Assessment and Plan 1. Hypotension/shock  
 -BP improved/low normal on IVF,  Midodrine was increased and now weaned to 1 vasopressor (Levophed 1 mcg/min) 2. Laquey Mort CAD/Elevated troponin due to NSTEMI and d/t increased myocardial demand.  
 -Troponin trending back down, now 2.55 yesterday afternoon. -Echo with anteroapical hypokinesis, severe 
            -No current chest pain. -ASA, plavix, statin. -BB on hold for hypotension 
 -Cath 2018 severe lesion in the proximal LAD nextto the  left main, a long lesion all throughout the mid RCA and several sequential lesions in the circumflex  
 -Not a candidate for CABG or PCI in 2018 or now. 3. Acute on chronic systolic CHF: EF 11-08% 5/17/94, (was 25% (8/14/20) -NYHA class III-IV 
            -ProBNP trending up slightly albeit in setting of worsening JEROD. -Net -611 mls (if I/O accurate) 
            -Diuretics on hold given rising creatinine.  
            -Coreg on hold (low BP on vasopressors) -Lisinopril on hold given rise in creatinine and low BP. 
 -Difficult balance of  
  
4. JEROD on CKD: creatinine trending up Lab Results Component Value Date/Time Creatinine 2.05 (H) 08/19/2020 03:58 AM  
 -Hold further diuretics for now.  
 -Hold lisinopril. 
 -On IVF maintenance 5. Bilateral pleural effusions: R>L 
            -defer to primary team if tap warranted. 
              
6. Small Pericardial effusion: by TTE 8/17/20 
 -No tamponade. No intervention needed 7. Chronic anemia:   
 -Hgb 9 from 7.3 s/p 1 unit PRBVCs 8/17/20 
 
8. Mild Aortic stenosis/ Mild-mod MR   
 -AS tighter on planimetry but may reflect low gradient AS due to CHF           
  
9. PFO:  
 -noted again on repeat echo Left to right shunt. 
  
10. Hx of HTN:  bp low now. 
            -holding coreg, lisinopril 11. UTI:   
 -UA + Leuk esterase, 2+ bacteria, neg Nitrities. UC pending. 
 -On recephin/Vanc, management per primary team. 
 
Other comorbids: 
12. Belkis Caldera Hx of COVID 19 June 2020 
            -Repeat covid test 8/17/20 again negative 13. Metastatic breast cancer with liver lesion and osseous metastatic lesions. -hx of Tumor erosion of left breast, causing bleeding  
 -Had XRT to liver lesions per son's report -Follows with Dr. Fang Lua at [facility]. 
            -Per chart, Has scheduled appt at [facility] for possible excision 14. DM type II: A1C 8.8 15. Advanced directives:  Full code status. .  
  
Good spirits this a.m. and no chest pain, no SOB at rest. BP improving on midodrine and levophed weaned to 1 mcg/min this a.m- hopefully off soon. Creatinine trending up this a.m. despite IVF and diuretics held. Unfortunately GDMT for CHF limited by low bp on pressors and renal function. Further plan per Dr. Romelia Jenkins (Dr. Romelia Jenkins to see pt later this afternoon). HPI: 82 y.o. female with PMH of significant multivessel CAD, systolic CHF, CKD, colon cancer, DM, HTN, metastatic breast cancer with liver lesion, CVA, COVID 19, osteoporosis, RA, anemia. Was discharged 8/16/20 after hospitalization for CHF exacerbation and elevated troponin with noted further reduction in EF to 25%. Presented again in evning 8/16/20 with reported fever and SOB with family ocncnerned for pulmonary edema. Notable bilateral pleural effusions on Xray (R>L). Troponin elevated but less than last admission. Received trial of lasix IV with improvement in SOB but bp low-marginal. Pt SOB was improved in afternoon. Had brief episode of pulselessness ? PEA on 8/17/20 requiring 1 round of CPR and epinephrine. Was intubated but quickly extubated. Concern for STEMI but upon further review of EKG, no STEMI. Renal function worsening. Past Medical History:  
Diagnosis Date  Acute on chronic systolic HF (heart failure) ([address] 5/19/2018  Age-related osteoporosis without current pathological fracture 9/2/2009  Anemia 9/2/2009  Asymptomatic hyperuricemia 2/16/2017  Breast cancer ([address]  CAD (coronary artery disease) 6/21/2018  Carotid bruit 8/31/2011  CHF (congestive heart failure) ([address]  CKD (chronic kidney disease) stage 3, GFR 30-59 ml/min ([facility]) 10/25/2017  Colon cancer ([facility] 9/2/2009  
 surgery/chemo  Colon cancer ([facility] 9/2/2009  COVID-19   
 DM [facility]diabetes mellitus) [facility] 9/2/2009  GERD (gastroesophageal reflux disease)  H/O: CVA 9/2/2009  
 slight l sided weakness  Heart attack ([facility] [address]  HTN, goal below 140/90 9/2/2009  Hypothyroid 9/2/2009  Ill-defined condition   
 seasonal allergies  Long-term use of immunosuppressant medication 11/21/2016  Metastatic breast cancer ([facility] 6/1/2018  Microalbuminuria 9/2/2009  Osteoporosis 9/2/2009  Other and unspecified hyperlipidemia 1/27/2010  PAD (peripheral artery disease) ([facility] 9/7/2011  Primary osteoarthritis of both knees 9/28/2016  Rheumatoid arthritis involving ankle ([facility] 9/28/2016  Rheumatoid arthritis(714.0) 9/2/2009  Seropositive rheumatoid arthritis of multiple sites ([facility] 9/28/2016  Statin intolerance 12/21/2016  Type 2 diabetes mellitus with neurologic complication, with long-term current use of insulin ([facility] 9/2/2009  Type 2 diabetes with nephropathy ([facility] 8/20/2018  Unspecified essential hypertension 9/2/2009  Vitamin D deficiency 6/16/2017  Weakness due to cerebrovascular accident Social Hx Social History Socioeconomic History  Marital status:  Spouse name: Not on file  Number of children: Not on file  Years of education: Not on file  Highest education level: Not on file Occupational History  Not on file Social Needs  Financial resource strain: Not on file  Food insecurity Worry: Not on file Inability: Not on file  Transportation needs Medical: Not on file Non-medical: Not on file Tobacco Use  Smoking status: Never Smoker  Smokeless tobacco: Never Used Substance and Sexual Activity  Alcohol use: No  
 Drug use: No  
 Sexual activity: Not Currently Partners: Male Lifestyle  Physical activity Days per week: Not on file Minutes per session: Not on file  Stress: Not on file Relationships  Social connections Talks on phone: Not on file Gets together: Not on file Attends [employer] service: Not on file Active member of club or organization: Not on file Attends meetings of clubs or organizations: Not on file Relationship status: Not on file  Intimate partner violence Fear of current or ex partner: Not on file Emotionally abused: Not on file Physically abused: Not on file Forced sexual activity: Not on file Other Topics Concern  Not on file Social History Narrative  Not on file Objective: 
  
 Physical Exam: 
             
Visit Vitals /53 Pulse 91 Temp 98.2 °F (36.8 °C) Resp 29 Ht 5' 1\" (1.549 m) Wt 139 lb 5.3 oz (63.2 kg) SpO2 94% BMI 26.33 kg/m² General Appearance:   Well developed,,alert and oriented  Individual in no acute distress. Josh Small Ears/Nose/Mouth/Throat:    Hearing grossly normal. 
  
    Neck:  Supple. Chest:    Lungs bibasilar crackles to auscultation bilaterally, . No use of accessory muscles at rest  
Cardiovascular:    Regular rate and rhythm, S1, S2 normal, grade II/VI systolic murmur at LUSB. Abdomen:    Soft, mild +LUQ ttp. Extremities:  No edema bilaterally. BLE SCDs in place Skin:  Warm and dry. Telemetry: NSR Data Review:  
 Labs:   
Recent Results (from the past 24 hour(s)) GLUCOSE, POC Collection Time: 08/18/20 11:03 AM  
Result Value Ref Range Glucose (POC) 232 (H) 65 - 100 mg/dL Performed by Rui Mccabe EKG, 12 LEAD, INITIAL Collection Time: 08/18/20  3:10 PM  
Result Value Ref Range Ventricular Rate 93 BPM  
 Atrial Rate 93 BPM  
 P-R Interval 152 ms QRS Duration 78 ms Q-T Interval 366 ms  
 QTC Calculation (Bezet) 455 ms Calculated P Axis 61 degrees Calculated R Axis 49 degrees Calculated T Axis 148 degrees Diagnosis Normal sinus rhythm Anteroseptal infarct (cited on or before 25-DEC-2019) ST & T wave abnormality, consider lateral ischemia When compared with ECG of 17-AUG-2020 21:10, 
Marked T wave abnormality, consider anterolateral ischemia is no longer  
present Confirmed by Kam Reeves MD. ([id number]) on 8/18/2020 5:07:03 PM 
  
TROPONIN I Collection Time: 08/18/20  3:11 PM  
Result Value Ref Range Troponin-I, Qt. 2.55 (H) <0.05 ng/mL GLUCOSE, POC Collection Time: 08/18/20  5:06 PM  
Result Value Ref Range Glucose (POC) 287 (H) 65 - 100 mg/dL Performed by Samule Carbo GLUCOSE, POC Collection Time: 08/18/20  9:06 PM  
Result Value Ref Range Glucose (POC) 335 (H) 65 - 100 mg/dL Performed by Rafia Borden CBC W/O DIFF Collection Time: 08/19/20  3:58 AM  
Result Value Ref Range WBC 7.8 3.6 - 11.0 K/uL  
 RBC 3.38 (L) 3.80 - 5.20 M/uL HGB 9.0 (L) 11.5 - 16.0 g/dL HCT 28.5 (L) 35.0 - 47.0 % MCV 84.3 80.0 - 99.0 FL  
 MCH 26.6 26.0 - 34.0 PG  
 MCHC 31.6 30.0 - 36.5 g/dL  
 RDW 19.2 (H) 11.5 - 14.5 % PLATELET 859 370 - 129 K/uL MPV 10.0 8.9 - 12.9 FL  
 NRBC 0.6 (H) 0  WBC ABSOLUTE NRBC 0.05 (H) 0.00 - 0.01 K/uL METABOLIC PANEL, BASIC Collection Time: 08/19/20  3:58 AM  
Result Value Ref Range Sodium 135 (L) 136 - 145 mmol/L Potassium 5.1 3.5 - 5.1 mmol/L Chloride 106 97 - 108 mmol/L  
 CO2 19 (L) 21 - 32 mmol/L Anion gap 10 5 - 15 mmol/L Glucose 237 (H) 65 - 100 mg/dL BUN 66 (H) 6 - 20 MG/DL Creatinine 2.05 (H) 0.55 - 1.02 MG/DL  
 BUN/Creatinine ratio 32 (H) 12 - 20 GFR est AA 28 (L) >60 ml/min/1.73m2 GFR est non-AA 23 (L) >60 ml/min/1.73m2 Calcium 7.1 (L) 8.5 - 10.1 MG/DL  
NT-PRO BNP Collection Time: 08/19/20  4:06 AM  
Result Value Ref Range NT pro-BNP 26,935 (H) <450 PG/ML  
CORTISOL Collection Time: 08/19/20  4:06 AM  
Result Value Ref Range Cortisol, random 23.5 ug/dL GLUCOSE, POC  Collection Time: 08/19/20  6:45 AM  
 Result Value Ref Range Glucose (POC) 253 (H) 65 - 100 mg/dL Performed by Trudy Adams Radiology:  
 
  
Current Facility-Administered Medications Medication Dose Route Frequency  cefTRIAXone (ROCEPHIN) 1 g in 0.9% sodium chloride (MBP/ADV) 50 mL  1 g IntraVENous Q24H  
 NOREPINephrine (LEVOPHED) 32,000 mcg in dextrose 5% 250 mL (128 mcg/mL) infusion  0.5-30 mcg/min IntraVENous TITRATE  midodrine (PROAMATINE) tablet 15 mg  15 mg Oral TID WITH MEALS  
 0.9% sodium chloride infusion  75 mL/hr IntraVENous CONTINUOUS  
 famotidine (PEPCID) tablet 20 mg  20 mg Oral DAILY  sodium chloride (NS) flush 5-40 mL  5-40 mL IntraVENous Q8H  
 sodium chloride (NS) flush 5-40 mL  5-40 mL IntraVENous PRN  
 acetaminophen (TYLENOL) tablet 650 mg  650 mg Oral Q6H PRN Or  
 acetaminophen (TYLENOL) suppository 650 mg  650 mg Rectal Q6H PRN  polyethylene glycol (MIRALAX) packet 17 g  17 g Oral DAILY PRN  
 ondansetron (ZOFRAN) injection 4 mg  4 mg IntraVENous Q6H PRN  
 glucose chewable tablet 16 g  4 Tab Oral PRN  
 glucagon (GLUCAGEN) injection 1 mg  1 mg IntraMUSCular PRN  
 dextrose 10% infusion 0-250 mL  0-250 mL IntraVENous PRN  
 insulin lispro (HUMALOG) injection   SubCUTAneous AC&HS  aspirin delayed-release tablet 81 mg  81 mg Oral DAILY  clopidogreL (PLAVIX) tablet 75 mg  75 mg Oral DAILY  [Held by provider] carvediloL (COREG) tablet 6.25 mg  6.25 mg Oral BID  levothyroxine (SYNTHROID) tablet 88 mcg  88 mcg Oral ACB  [Held by provider] lisinopriL (PRINIVIL, ZESTRIL) tablet 5 mg  5 mg Oral DAILY  0.9% sodium chloride infusion 250 mL  250 mL IntraVENous PRN Harsh Sanchez. [facility] [address] [place] [address] Rd, [address] [place] 
 [phone]

## 2020-08-19 NOTE — PROGRESS NOTES
[facility] 
 
ICU TEAM Progress Note Name: Dayna Hines : 1935 MRN: [id number] Date: 2020 Assessment:  
 
ICU Problems: 1. PEA Arrest (17 Aug 2020 - CPR - ROSC) 2. Metastatic Breast Cancer (radiation treatment) 3. Open Wound Under Left Breast (prior to admission) 4. Septic Shock (from UTI prior to admission) 5. Anemia 6. STEMI 7. CAD 8. UTI (multiple Klebsiella UTIs in past) 9. Acute on Chronic CHF 10. DM2 11. Colon Cancer ICU Comprehensive Plan of Care:  
 
Plans for this Shift:  
1. Spoke with son and patient about goals of care - full code 2. Increase Midodrine to 15 mg TID 3. Wean NE as able 4. Start NS at 75 cc/hr 5. SBP goal > 90 mmHg 6. Discussed case with Cardiology 7. Stop Ceftriaxone 8. Start Vanc & Cefepime (procal trending up, awaiting urine micro, open wound under left breast, immunocompromised) 9. Trend Lactate/Procal 
10. Urine for culture pending 11. SBP Goal of: > 90 mmHg 12. Norepinephrine - For above SBP/MAP goals 13. Transfusion Trigger (Hgb): <7.5 g/dL 14. Respiratory Goals: 
a. Head of bed > 30 degrees 
b. Incentive spirometry 15. Pulmonary toilet: Duo-Nebs 16. SpO2 Goal: > 92% 17. Keep K>4; Mg>2 18. PT/OT: Consulted 19. Discussed Plan of Care/Code Status: Full Code 20. Appreciate Consultants Input: Cardiology 21. Discussed Care Plan with Bedside RN 
22. Documentation of Current Medications 23. Rest of Plan Below: 
 
F - Feeding:  Yes A - Analgesia: None S - Sedation: None T - DVT Prophylaxis: Plavix, SCD's or Sequential Compression Device and ASA 81 mg BID H - Head of Bed: > 30 Degrees U - Ulcer Prophylaxis: Not at this time G - Glycemic Control: Insulin S - Spontaneous Breathing Trial: N/A 
B - Bowel Regimen: None needed at this time I - Indwelling Catheter: 
 Tubes: None Lines: Peripheral IV and [employer] Drains: Alford Catheter D - De-escalation of Antibiotics:  
 
Subjective: Progress Note: 8/19/2020 Reason for ICU Admission: s/p Cardiac arrest  
 
HPI: 81 y/o F w hx of CHF, CAD, CKD, DM, GERD, breast cancer presents with SOB and lethargy after being discharged from the hospital less than 24 hours prior. Her previous admission was for acute hypoxic respiratory failure in the setting of volume overload and new reduction in her EF from 40% to 25%. She was placed on BiPAP at the time and diuresed. She was discharged on an increased dose of lasix from her prior.  
  
Per report from the family, she had a low grade temperature at home and her SpO2 on their home device measured anywhere from 82-88%. On arrival to the ED she was saturating 100% on room air. She had a chest CT obtained here 8/17 which showed b/l mild-mod pleural effusions and a mod sized circumferential pericardial effusion (known from previous TTE).   
I initially was consulted on Ms. Ledesma for hypotension earlier this am. In the interim, she was evaluated by cardiology - coreg continued and 1 dose lasix given. Unclear volume status as patient was in ED boarding all day. On arrival to the floor, a rapid response was called, followed shortly by code stroke and code blue. She has 1 round CPR w 1x epi, 1x bicarb, and was intubated. On my arrival to the room, patient was intubated and expressing that the ETT be removed. Her BP was 150/80 and O2 sat was 100, she was neuro intact and following complex commands. I extubated her at the bedside and she remained stable on 2 lpm NC. Following extubation her pressures dipped and I gave her 30 mcg epinephrine in divided doses while waiting for phenylephrine gtt to start. She remains confused on arrival to the ICU. Unclear what precipitated her deterioration this afternoon. With the known effusion and lasix dose given earlier, we elected to give a bolus of IVF. She was noted to be anemic and I ordered 1x PRBC.  Given her hx of extensive CAD, she certainly suffered myocardial hypoperfusion through this event and ECG on arrival to the ICU was concerning for STEMI and code stemi was called. Cardiology agreed that STAT TTE would be helpful in determining plan of care as they felt cath would unlikely be of any benefit. Will obtain stat TTE and if continued decline in systolic function will discuss additional therapy options with family and cardiology. Had extensive discussion with both of her sons about goals of care and expected trajectories following her cardiac arrest. She is to be a full code per her stated wishes from earlier during this hospitalization. Addendum: thick, purulent urine noted on placement of Alford. Patient has had numerous Klebsiella UTIs this year and she is already on ceftriaxone. Will continue current therapy and send UA/Cx. Active Problem List:  
 
Problem List  Date Reviewed: 7/23/2020 Codes Class Heart failure ([facility] ICD-10-CM: I50.9 ICD-9-CM: 428.9 CHF (congestive heart failure) ([facility]) ICD-10-CM: I50.9 ICD-9-CM: 428.0 Bacteremia due to Gram-negative bacteria ICD-10-CM: R78.81 ICD-9-CM: 790.7, 041.85   
   
 UTI (urinary tract infection) ICD-10-CM: N39.0 ICD-9-CM: 599.0 Septic shock ([facility]) ICD-10-CM: A41.9, R65.21 ICD-9-CM: 038.9, 785.52, 995.92 Dizziness ICD-10-CM: H90 ICD-9-CM: 780.4 Type 2 diabetes with nephropathy ([facility]) ICD-10-CM: E11.21 
ICD-9-CM: 250.40, 583.81   
   
 CAD (coronary artery disease) ICD-10-CM: I25.10 ICD-9-CM: 414.00 Metastatic breast cancer ([facility] ICD-10-CM: E42.977 ICD-9-CM: 174.9 CKD (chronic kidney disease) stage 3, GFR 30-59 ml/min ([facility]) ICD-10-CM: N18.3 ICD-9-CM: 645. 3 Vitamin D deficiency ICD-10-CM: E55.9 ICD-9-CM: 268.9 Asymptomatic hyperuricemia ICD-10-CM: E79.0 ICD-9-CM: 790.6 Statin intolerance ICD-10-CM: Z78.9 ICD-9-CM: 995.27   
   
 Long-term use of immunosuppressant medication ICD-10-CM: Z79.899 ICD-9-CM: V58.69 Seropositive rheumatoid arthritis of multiple sites [facility]) ICD-10-CM: M05.79 ICD-9-CM: 714.0 Primary osteoarthritis of both knees ICD-10-CM: M17.0 ICD-9-CM: 715.16 Weakness due to cerebrovascular accident ICD-10-CM: [id number] ICD-9-CM: [id number] PAD (peripheral artery disease) ([facility]) ICD-10-CM: I73.9 ICD-9-CM: 443.9 Carotid bruit ICD-10-CM: R09.89 ICD-9-CM: 196. 9 Hyperlipidemia ICD-10-CM: E78.5 ICD-9-CM: 272.4 Type 2 diabetes mellitus with neurologic complication, with long-term current use of insulin ([facility]) ICD-10-CM: E11.49, Z79.4 ICD-9-CM: 250.60, V58.67 Colon cancer [facility]) ICD-10-CM: C18.9 ICD-9-CM: 153.9 Age-related osteoporosis without current pathological fracture ICD-10-CM: M81.0 ICD-9-CM: 733.01   
   
 HTN, goal below 140/90 ICD-10-CM: I10 
ICD-9-CM: 401.9 Anemia ICD-10-CM: D64.9 ICD-9-CM: 285.9 Past Medical History:  
 
 has a past medical history of Acute on chronic systolic HF (heart failure) ([facility] (5/19/2018), Age-related osteoporosis without current pathological fracture (9/2/2009), Anemia (9/2/2009), Asymptomatic hyperuricemia (2/16/2017), Breast cancer ([facility] CAD (coronary artery disease) (6/21/2018), Carotid bruit (8/31/2011), CHF (congestive heart failure) ([facility] CKD (chronic kidney disease) stage 3, GFR 30-59 ml/min ([facility] (10/25/2017), Colon cancer ([facility] (9/2/2009), Colon cancer ([facility] (9/2/2009), COVID-19, DM (diabetes mellitus) ([facility] (9/2/2009), GERD (gastroesophageal reflux disease), H/O: CVA (9/2/2009), Heart attack ([facility] HTN, goal below 140/90 (9/2/2009), Hypothyroid (9/2/2009), Ill-defined condition, Long-term use of immunosuppressant medication (11/21/2016), Metastatic breast cancer ([facility] (6/1/2018), Microalbuminuria (9/2/2009), Osteoporosis (9/2/2009), Other and unspecified hyperlipidemia (1/27/2010), PAD (peripheral artery disease) ([facility] (9/7/2011), Primary osteoarthritis of both knees (9/28/2016), Rheumatoid arthritis involving ankle ([facility] (9/28/2016), Rheumatoid arthritis(714.0) (9/2/2009), Seropositive rheumatoid arthritis of multiple sites ([facility] (9/28/2016), Statin intolerance (12/21/2016), Type 2 diabetes mellitus with neurologic complication, with long-term current use of insulin ([facility] (9/2/2009), Type 2 diabetes with nephropathy ([facility] (8/20/2018), Unspecified essential hypertension (9/2/2009), Vitamin D deficiency (6/16/2017), and Weakness due to cerebrovascular accident. Past Surgical History:  
 
 has a past surgical history that includes hx colectomy; hx hysterectomy; and hx other surgical. 
 
Home Medications:  
 
Prior to Admission medications Medication Sig Start Date End Date Taking? Authorizing Provider  
furosemide (LASIX) 40 mg tablet Take 1 Tab by mouth two (2) times a day. 8/16/20  Yes Brgiid Kirby, DO  
lisinopriL (PRINIVIL, ZESTRIL) 5 mg tablet Take 1 Tab by mouth daily. 8/17/20  Yes Brigid Kirby, DO  
carvediloL (Coreg) 6.25 mg tablet Take 6.25 mg by mouth two (2) times daily (with meals). Yes Provider, Historical  
clopidogreL (PLAVIX) 75 mg tab TAKE ONE TABLET BY MOUTH DAILY 6/2/20  Yes Watson Alpers, MD  
cholecalciferol (Vitamin D3) 25 mcg (1,000 unit) cap Take 1,000 Units by mouth daily. Yes Provider, Historical  
glucosam/wesley-msm1/C/bassam/bosw (OSTEO BI-FLEX TRIPLE STRENGTH PO) Take 2 Tabs by mouth daily. Yes J Luis Thornton MD  
evolocumab (Repatha SureClick) pen injection 814 mg by SubCUTAneous route every fourteen (14) days. Yes Provider, Historical  
insulin aspart U-100 (NOVOLOG FLEXPEN U-100 INSULIN) 100 unit/mL (3 mL) inpn by SubCUTAneous route Before breakfast, lunch, dinner and at bedtime. 2-3 units depending on blood sugar before meals.  
(Note: son states very sensitive.)   Yes Provider, Historical  
 multivitamin (ONE A DAY) tablet Take 1 Tab by mouth daily. Yes Provider, Historical  
insulin degludec (TRESIBA FLEXTOUCH U-100) 100 unit/mL (3 mL) inpn 14 Units by SubCUTAneous route daily. Yes Provider, Historical  
nitroglycerin (NITROSTAT) 0.4 mg SL tablet 1 Tab by SubLINGual route as needed for Chest Pain. Up to 3 doses. Patient taking differently: 1 Tab by SubLINGual route every five (5) minutes as needed for Chest Pain. Up to 3 doses. 18  Yes Kishor San MD  
levothyroxine (SYNTHROID) 88 mcg tablet Take 88 mcg by mouth Daily (before breakfast). Yes Provider, Historical  
aspirin delayed-release 81 mg tablet Take 81 mg by mouth daily. Yes Provider, Historical  
OMEGA-3 FATTY ACIDS/FISH OIL (OMEGA 3 FISH OIL PO) Take  by mouth daily. Yes Provider, Historical  
 
 
Allergies/Social/Family History: Allergies Allergen Reactions  Statins-Hmg-Coa Reductase Inhibitors Other (comments) Intolerant to statins  Sulfa (Sulfonamide Antibiotics) Other (comments)  
  syncope Social History Tobacco Use  Smoking status: Never Smoker  Smokeless tobacco: Never Used Substance Use Topics  Alcohol use: No  
  
Family History Problem Relation Age of Onset  Diabetes Mother  Stroke Mother  Diabetes Sister  Other Sister   
     fell and hit her head -  of this  Diabetes Brother  Cancer Father   
     stomach  Diabetes Sister Review of Systems:  
 
Constitutional: positive for confusion, lethargy Eyes: negative Respiratory: negative Cardiovascular: negative Gastrointestinal: negative Genitourinary:negative Integument/breast: negative Hematologic/lymphatic: negative Musculoskeletal:negative Neurological: positive for memory problems and confusion Behavioral/Psych: negative Objective:  
Vital Signs: 
Visit Vitals /51 Pulse 91 Temp 98.8 °F (37.1 °C) Resp 23 Ht 5' 1\" (1.549 m) Wt 63.2 kg (139 lb 5.3 oz) SpO2 94% BMI 26.33 kg/m² O2 Flow Rate (L/min): 2 l/min O2 Device: Room air Temp (24hrs), Av.5 °F (36.9 °C), Min:98 °F (36.7 °C), Max:98.9 °F (37.2 °C) CVP (mmHg): 7 mmHg (20 0700) Intake/Output:  
 
Intake/Output Summary (Last 24 hours) at 2020 3167 Last data filed at 2020 0600 Gross per 24 hour Intake 93.67 ml Output 705 ml Net -611.33 ml Physical Exam: 
 
General:  Alert, cooperative, well noursished, well developed, appears stated age Eyes:  Sclera anicteric. Pupils equally round and reactive to light. Mouth/Throat: Mucous membranes normal, oral pharynx clear Neck: Supple Lungs:   Clear to auscultation bilaterally, good effort CV:  Regular rate and rhythm,no murmur, click, rub or gallop Abdomen:   Soft, non-tender. bowel sounds normal. non-distended Extremities: No cyanosis or edema Skin: Open wound under L breast (erythematous, ulcerated) Lymph nodes: Cervical and supraclavicular normal  
Musculoskeletal: No swelling or deformity Lines/Devices:  Intact, no erythema, drainage or tenderness Psych: Oriented to Person, Place, Time LABS AND  DATA: Personally reviewed Recent Labs 20 
5576 20 
0533 WBC 7.8 9.4 HGB 9.0* 9.3* HCT 28.5* 30.6*  210 Recent Labs 20 
1856 20 
8272 20 
0533 20 *  --  134* 135*  
K 5.1  --  5.3* 4.7   --  105 105 CO2 19*  --  17* 20* BUN 66*  --  54* 51* CREA 2.05*  --  1.95* 1.83* *  --  231* 186* CA 7.1*  --  7.4* 7.4* MG  --  2.0  --  2.0 PHOS  --   --   --  2.9 Recent Labs 08/17/20 
2352 * 412* TP 6.4 7.3 ALB 2.3* 2.3*  
GLOB 4.1* 5.0* Recent Labs 20 INR 1.3* PTP 12.8* No results for input(s): PHI, PCO2I, PO2I, FIO2I in the last 72 hours. Recent Labs 20 
1511 20 
0533 TROIQ 2.55* 2.78* MEDS: Reviewed Chest X-Ray: CXR Results  (Last 48 hours) 08/17/20 2108  XR CHEST PORT Final result Impression:  IMPRESSION:  
1. No complicating features post line placement. 2. Enlarged cardiac silhouette. Pericardial effusion on previous CT 3. Right greater than left pleural effusions 4. Diffuse bony metastatic disease Narrative:  INDICATION: Line placement EXAM: Portable chest 2104 hours . Comparison 8/17/2020. FINDINGS: A right neck central venous catheter has been placed. Tip overlies the  
superior vena cava. Cardiac silhouette remains enlarged. Right pleural effusion  
is again noted with basilar atelectasis. No pneumothorax. Diffuse bony  
metastatic disease. ECHO (8/17/2020): 
Result status: Final result · LV: Estimated LVEF is 20 - 25% with severe global, anteroapical hypokinesis. · AV: Mild aortic valve stenosis is present. Aortic valve mean gradient is 12 mmHg. · MV: Mild to moderate mitral valve regurgitation is present. · Pericardium: Small circumferential pericardial effusion. No indications of tamponade present. Multidisciplinary Rounds Completed: Yes ABCDEF Bundle/Checklist Completed: 
Yes SPECIAL EQUIPMENT None DISPOSITION Stay in ICU CRITICAL CARE CONSULTANT NOTE I had a face to face encounter with the patient, reviewed and interpreted patient data including clinical events, labs, images, vital signs, I/O's, and examined patient. I have discussed the case and the plan and management of the patient's care with the consulting services, the bedside nurses and the respiratory therapist.   
 
NOTE OF PERSONAL INVOLVEMENT IN CARE This patient has a high probability of imminent, clinically significant deterioration, which requires the highest level of preparedness to intervene urgently.  I participated in the decision-making and personally managed or directed the management of the following life and organ supporting interventions that required my frequent assessment to treat or prevent imminent deterioration. I personally spent 65 minutes of critical care time. This is time spent at this critically ill patient's bedside actively involved in patient care as well as the coordination of care and discussions with the patient's family. This does not include any procedural time which has been billed separately. Jeane Mendoza Critical Care Medicine [facility] Physicians

## 2020-08-19 NOTE — PROGRESS NOTES
Pharmacist Note - Vancomycin Dosing Consult provided for this 82 y.o. female for indication of sepsis/UTI. Antibiotic regimen(s): cefepime+vancomycin Patient on vancomycin PTA? NO Recent Labs 20 
6827 20 
0533 20 
2121 WBC 7.8 9.4 8.5 CREA 2.05* 1.95* 1.83* BUN 66* 54* 51* Frequency of BMP: daily Height: 154.9 cm Weight: 63.2 kg Est CrCl: ~ 15-20 ml/min; UO: 0.5 ml/kg/hr Temp (24hrs), Av.5 °F (36.9 °C), Min:98.2 °F (36.8 °C), Max:98.9 °F (37.2 °C) Cultures: 
Cultures:  
 blood NGTD 
 urine  NG 
 
Goal trough = 15 - 20 mcg/mL Therapy will be initiated with a loading dose of 1500 mg IV x 1 to be followed by a maintenance dose of 750 mg IV every 36 hours. Pharmacy to follow patient daily and order levels / make dose adjustments as appropriate.

## 2020-08-19 NOTE — PROGRESS NOTES
0730 Bedside and Verbal shift change report given to Cat (oncoming nurse) by Leslie Ceron (offgoing nurse). Report included the following information SBAR. 
 
0900 Rubén NP at bedside. No orders received. Labs drawn. 0950 Levophed off.  
 
1130 Pt up to chair 1430 Pt back to bed. Assist x 2.  with exertion. Will continue to monitor. 36 Pt saying she feels SOB. Ratzlaff DO at bedside. One time dose of 20 mg Lasix ordered. 1930 Bedside and Verbal shift change report given toAmy RN (oncoming nurse) by Cat RN (offgoing nurse). Report included the following information SBAR.

## 2020-08-19 NOTE — PROGRESS NOTES
Chart Review Only Patient on Cov19 D/C [facility] Enrollment fu Contact list.  
Patient presently enrolled as Inpatient. Patient presently followed by HF Will not follow-up.

## 2020-08-19 NOTE — INTERDISCIPLINARY ROUNDS
Multidisciplinary rounds were held 8/16/2020. Today's plan/goal includes (but not limited to):   Wean levophed, abx

## 2020-08-20 NOTE — INTERDISCIPLINARY ROUNDS
Multidisciplinary rounds were held August 20, 2020. Today's plan/goal includes (but not limited to): pt transfer to step down unit. Continue with stable BP.

## 2020-08-20 NOTE — CONSULTS
[address] INITIAL NOTE Presentation Magen Lorenzo is a 82 y.o. female with a PMH of chronic heart failure, anemia, advanced metastatic breast cancer, CAD, CKD, colon cancer, COVID-19 pneumonia, type two diabetes, GERD, CVA, hypothyroidism, HLD, and rheumatoid arthritis who presented to the ED 8/16/20 with a c/o fevers and shortness of breath. She was admitted with acute respiratory failure, acute on chronic CHF with elevated troponins. DX: Acute respiratory failure, UTI with septic shock, PEA arrest, STEMI 
 
TX: IV antibiotics, diuresis, respiratory support, midodrine Current clinical course has been complicated by PEA arrest and septic shock. Diabetes: Patient has a 50 year history of known type two diabetes, treated with [place] and Humalog PTA. Admitting A1C 8.8% (from 6.7% 12/19). Consulted by Provider for advanced diabetes nursing assessment and care, specifically related to  
[] Transitioning off Darletta Babinski [x] Inpatient management strategy [] Home management assessment 
[] Survival skill education Diabetes-related medical history Acute complications: Hyperglycemia without acidosis Neurological complications Peripheral neuropathy Microvascular disease Nephropathy Macrovascular disease CAD, Myocardial infarction and Cerebral vascular accident Diabetes medication history Drug class Currently in use Discontinued Never used Biguanide DDP-4 inhibitor Sulfonylurea Thiazolidinedione GLP-1 RA SGLT-2 inhibitors Basal insulin Tresiba 12-14 units daily Fixed Dose  Combinations Bolus insulin Novolog 2-4 units before meals Subjective Patient resting at bedside. Son insistent that I not speak directly to her as she was weak, confused and tired. History obtained from son who was a good historian.   Son appeared very eager, stressed and hypervigilant at bedside. Patient with a 50 year history of type two diabetes Lives with son, his family and has two nurse aides during the day Diabetes is managed by Dr Chata Rueda Family checks glucose 3-4 times a day and will administer insulin for her Glucose is 150-300s during the day. Hospital course: CAD- not a candidate for PCI or CABGEcho with anteroapical hypokinesis, severe JEROD Hypotension Bilateral pleural effusions Anemia s/p PRBC transfusion UTI on IV antibiotics Metastatic breast cancer with liver lesion and osseous metastatic lesions. Glucose 244-310 Objective Physical exam 
General Patient will respond to voice, confused but will answer with 1-2 word answers Vital Signs Visit Vitals /67 Pulse (!) 115 Temp 97.8 °F (36.6 °C) Resp 29 Ht 5' 1\" (1.549 m) Wt 66.7 kg (147 lb 0.8 oz) SpO2 100% BMI 27.78 kg/m² Skin  Warm and dry. Acanthosis noted along neckline. No lipohypertrophy or lipoatrophy noted at injection sites Heart   Regular rate and rhythm. No murmurs, rubs or gallops Lungs  Clear to auscultation without rales or rhonchi Extremities No foot wounds Laboratory Lab Results Component Value Date/Time Hemoglobin A1c 8.8 (H) 08/12/2020 12:01 PM  
 Hemoglobin A1c (POC) 7.2 03/09/2012 04:16 PM  
 
Lab Results Component Value Date/Time LDL, calculated 31.6 04/16/2018 04:21 AM  
 
Lab Results Component Value Date/Time Creatinine 1.97 (H) 08/20/2020 04:24 AM  
 
Lab Results Component Value Date/Time  Sodium 136 08/20/2020 04:24 AM  
 Potassium 4.7 08/20/2020 04:24 AM  
 Chloride 107 08/20/2020 04:24 AM  
 CO2 20 (L) 08/20/2020 04:24 AM  
 Anion gap 9 08/20/2020 04:24 AM  
 Glucose 233 (H) 08/20/2020 04:24 AM  
 BUN 63 (H) 08/20/2020 04:24 AM  
 Creatinine 1.97 (H) 08/20/2020 04:24 AM  
 BUN/Creatinine ratio 32 (H) 08/20/2020 04:24 AM  
 GFR est AA 29 (L) 08/20/2020 04:24 AM  
 GFR est non-AA 24 (L) 08/20/2020 04:24 AM  
 Calcium 7.3 (L) 08/20/2020 04:24 AM  
 Bilirubin, total 0.7 08/17/2020 09:21 PM  
 Alk. phosphatase 339 (H) 08/17/2020 09:21 PM  
 Protein, total 6.4 08/17/2020 09:21 PM  
 Albumin 2.3 (L) 08/17/2020 09:21 PM  
 Globulin 4.1 (H) 08/17/2020 09:21 PM  
 A-G Ratio 0.6 (L) 08/17/2020 09:21 PM  
 ALT (SGPT) 191 (H) 08/17/2020 09:21 PM  
 
Lab Results Component Value Date/Time ALT (SGPT) 191 (H) 08/17/2020 09:21 PM  
 
 
Factors affecting BG pattern Factor Dose Comments Diet Consistent Carbohydrate 60 grams/meal   
Drugs: 
Other: IV antibiotics Vancomycin Pain Infection UTI Other: Hypotension   Impaired insulin delivery to the tissues Blood glucose pattern Assessment and Plan Nursing Diagnosis Risk for unstable blood glucose pattern Nursing Intervention Domain 5250 Decision-making Support Nursing Interventions Examined current inpatient diabetes control Explored factors facilitating and impeding inpatient management Identified self-management practices impeding diabetes control Explored corrective strategies with patient and responsible inpatient provider Informed patient of rational for insulin strategy while hospitalized Evaluation Jae Ignacio is an 82year old female with uncontrolled type two diabetes and multiple other co-morbidities who's clinical course is impacted by her metastatic breast cancer, CAD, CKD, and and septic shock. At this time her A1C is 8.8% which is directly impacted by her complex medical condition and family wishes for conservative glucose management. At this time, she is quite hyperglycemic to the 300s and basal insulin would be appropriate to start to target an inpatient glucose goal closer to 180. Recommendations Recommend: 1. Family agreed to 8 units Lantus daily. This is likely not enough to bring glucose in goal for which son verbalized he understood. Increase to 12 units tomorrow if fasting glucose over 200. 2. Continue correctional insulin at normal sensitivity [facility] Billing Code(s) I personally reviewed chart, notes, data and current medications in the medical record, and examined the patient at bedside before making care recommendations. Thank you for including us in their care. I spent 60 minutes in direct patient care today for this patient. Time includes chart review, face to face with patient and collaboration with interdisciplinary care team. 
   
 
[facility] Health Access via [address] [place] [address] [phone]

## 2020-08-20 NOTE — PROGRESS NOTES
[facility] 
 
ICU TEAM Progress Note Name: Preethi Ford : 1935 MRN: [id number] Date: 2020 Assessment:  
 
ICU Problems: 1. PEA Arrest (17 Aug 2020 - CPR - ROSC) a. STEMI 
b. CAD 
c. Pericardial Effusion 
d. Acute on Chronic CHF 
i. Bilateral Pleural Effusions 
e. Anemia 2. Metastatic Breast Cancer (radiation treatment) a. Open Wound Under Left Breast (prior to admission) 3. Septic Shock (from UTI prior to admission) a. UTI (multiple Klebsiella UTIs in past) 4. DM2 
5. Colon Cancer ICU Comprehensive Plan of Care:  
 
Plans for this Shift: 1. Amio bolus of 150 mg now x 1 
2. Mag 2 g IV x 1 now 3. Hold BB due to low BP in lieu of needing fluids for sepsis and midodrine to wean off Levo 4. Obtain PICC 5. Discussed with Cardiology Team 
a. Add Milrinone 
b. Schedule Lasix BID 6. Spoke with son and patient about goals of care - full code 
a. I showed him the CXRs and CT findings of her pleural and pericardial effusions 7. Decrease Midodrine to 10 mg TID 8. Stop NS 
9. SBP goal > 90 mmHg 10. Consult to DM team - appreciate rec 
a. Adding Lantus regimen 11. Continue Vanc & Cefepime (procal leveling down, urine micro NGTD, open wound under left breast, immunocompromised; multiple Klebsiella UTIs PTA) 12. Trend Lactate/Procal 
13. Urine for culture pending 14. SBP Goal of: > 90 mmHg 15. Norepinephrine - For above SBP/MAP goals 16. Transfusion Trigger (Hgb): <7.5 g/dL 17. Respiratory Goals: 
a. Head of bed > 30 degrees 
b. Incentive spirometry 18. Pulmonary toilet: Duo-Nebs 19. SpO2 Goal: > 92% 20. Keep K>4; Mg>2  
21. PT/OT: Consulted 22. Discussed Plan of Care/Code Status: Full Code 23. Appreciate Consultants Input: Cardiology 24. Discussed Care Plan with Bedside RN 
25. Documentation of Current Medications 26. Rest of Plan Below: 
 
F - Feeding:  Yes A - Analgesia: None S - Sedation: None T - DVT Prophylaxis: Plavix, SCD's or Sequential Compression Device and ASA 81 mg BID H - Head of Bed: > 30 Degrees U - Ulcer Prophylaxis: Not at this time G - Glycemic Control: Insulin S - Spontaneous Breathing Trial: N/A 
B - Bowel Regimen: None needed at this time I - Indwelling Catheter: 
 Tubes: None Lines: Peripheral IV and PICC Line Drains: Alford Catheter D - De-escalation of Antibiotics:  
 
Subjective:  
Progress Note: 8/20/2020 Reason for ICU Admission: s/p Cardiac arrest  
 
HPI: 81 y/o F w hx of CHF, CAD, CKD, DM, GERD, breast cancer presents with SOB and lethargy after being discharged from the hospital less than 24 hours prior. Her previous admission was for acute hypoxic respiratory failure in the setting of volume overload and new reduction in her EF from 40% to 25%. She was placed on BiPAP at the time and diuresed. She was discharged on an increased dose of lasix from her prior.  
  
Per report from the family, she had a low grade temperature at home and her SpO2 on their home device measured anywhere from 82-88%. On arrival to the ED she was saturating 100% on room air. She had a chest CT obtained here 8/17 which showed b/l mild-mod pleural effusions and a mod sized circumferential pericardial effusion (known from previous TTE).   
I initially was consulted on Ms. Ledesma for hypotension earlier this am. In the interim, she was evaluated by cardiology - coreg continued and 1 dose lasix given. Unclear volume status as patient was in ED boarding all day. On arrival to the floor, a rapid response was called, followed shortly by code stroke and code blue. She has 1 round CPR w 1x epi, 1x bicarb, and was intubated. On my arrival to the room, patient was intubated and expressing that the ETT be removed. Her BP was 150/80 and O2 sat was 100, she was neuro intact and following complex commands.  I extubated her at the bedside and she remained stable on 2 lpm NC. Following extubation her pressures dipped and I gave her 30 mcg epinephrine in divided doses while waiting for phenylephrine gtt to start. She remains confused on arrival to the ICU. Unclear what precipitated her deterioration this afternoon. With the known effusion and lasix dose given earlier, we elected to give a bolus of IVF. She was noted to be anemic and I ordered 1x PRBC. Given her hx of extensive CAD, she certainly suffered myocardial hypoperfusion through this event and ECG on arrival to the ICU was concerning for STEMI and code stemi was called. Cardiology agreed that STAT TTE would be helpful in determining plan of care as they felt cath would unlikely be of any benefit. Will obtain stat TTE and if continued decline in systolic function will discuss additional therapy options with family and cardiology. Had extensive discussion with both of her sons about goals of care and expected trajectories following her cardiac arrest. She is to be a full code per her stated wishes from earlier during this hospitalization. Addendum: thick, purulent urine noted on placement of Alford. Patient has had numerous Klebsiella UTIs this year and she is already on ceftriaxone. Will continue current therapy and send UA/Cx. Active Problem List:  
 
Problem List  Date Reviewed: 7/23/2020 Codes Class Heart failure ([facility] ICD-10-CM: I50.9 ICD-9-CM: 428.9 CHF (congestive heart failure) ([facility]) ICD-10-CM: I50.9 ICD-9-CM: 428.0 Bacteremia due to Gram-negative bacteria ICD-10-CM: R78.81 ICD-9-CM: 790.7, 041.85   
   
 UTI (urinary tract infection) ICD-10-CM: N39.0 ICD-9-CM: 599.0 Septic shock ([facility]) ICD-10-CM: A41.9, R65.21 ICD-9-CM: 038.9, 785.52, 995.92 Dizziness ICD-10-CM: X99 ICD-9-CM: 780.4 Type 2 diabetes with nephropathy ([facility]) ICD-10-CM: E11.21 
ICD-9-CM: 250.40, 583.81   
   
 CAD (coronary artery disease) ICD-10-CM: I25.10 ICD-9-CM: 414.00 Metastatic breast cancer ([facility] ICD-10-CM: M39.915 ICD-9-CM: 174.9 CKD (chronic kidney disease) stage 3, GFR 30-59 ml/min ([facility]) ICD-10-CM: N18.3 ICD-9-CM: 136. 3 Vitamin D deficiency ICD-10-CM: E55.9 ICD-9-CM: 268.9 Asymptomatic hyperuricemia ICD-10-CM: E79.0 ICD-9-CM: 790.6 Statin intolerance ICD-10-CM: Z78.9 ICD-9-CM: 995.27 Long-term use of immunosuppressant medication ICD-10-CM: Z79.899 ICD-9-CM: V58.69 Seropositive rheumatoid arthritis of multiple sites [facility]) ICD-10-CM: M05.79 ICD-9-CM: 714.0 Primary osteoarthritis of both knees ICD-10-CM: M17.0 ICD-9-CM: 715.16 Weakness due to cerebrovascular accident ICD-10-CM: [id number] ICD-9-CM: INQ6304 PAD (peripheral artery disease) ([facility]) ICD-10-CM: I73.9 ICD-9-CM: 443.9 Carotid bruit ICD-10-CM: R09.89 ICD-9-CM: 385. 9 Hyperlipidemia ICD-10-CM: E78.5 ICD-9-CM: 272.4 Type 2 diabetes mellitus with neurologic complication, with long-term current use of insulin ([facility]) ICD-10-CM: E11.49, Z79.4 ICD-9-CM: 250.60, V58.67 Colon cancer [facility]) ICD-10-CM: C18.9 ICD-9-CM: 153.9 Age-related osteoporosis without current pathological fracture ICD-10-CM: M81.0 ICD-9-CM: 733.01   
   
 HTN, goal below 140/90 ICD-10-CM: I10 
ICD-9-CM: 401.9 Anemia ICD-10-CM: D64.9 ICD-9-CM: 285.9 Past Medical History:  
 
 has a past medical history of Acute on chronic systolic HF (heart failure) ([facility] (5/19/2018), Age-related osteoporosis without current pathological fracture (9/2/2009), Anemia (9/2/2009), Asymptomatic hyperuricemia (2/16/2017), Breast cancer ([facility] CAD (coronary artery disease) (6/21/2018), Carotid bruit (8/31/2011), CHF (congestive heart failure) ([facility] CKD (chronic kidney disease) stage 3, GFR 30-59 ml/min ([facility] (10/25/2017), Colon cancer ([facility] (9/2/2009), Colon cancer ([facility] (9/2/2009), COVID-19, DM (diabetes mellitus) ([facility] (9/2/2009), GERD (gastroesophageal reflux disease), H/O: CVA (9/2/2009), Heart attack ([facility] HTN, goal below 140/90 (9/2/2009), Hypothyroid (9/2/2009), Ill-defined condition, Long-term use of immunosuppressant medication (11/21/2016), Metastatic breast cancer ([facility] (6/1/2018), Microalbuminuria (9/2/2009), Osteoporosis (9/2/2009), Other and unspecified hyperlipidemia (1/27/2010), PAD (peripheral artery disease) ([facility] (9/7/2011), Primary osteoarthritis of both knees (9/28/2016), Rheumatoid arthritis involving ankle ([facility] (9/28/2016), Rheumatoid arthritis(714.0) (9/2/2009), Seropositive rheumatoid arthritis of multiple sites ([facility] (9/28/2016), Statin intolerance (12/21/2016), Type 2 diabetes mellitus with neurologic complication, with long-term current use of insulin ([facility] (9/2/2009), Type 2 diabetes with nephropathy ([facility] (8/20/2018), Unspecified essential hypertension (9/2/2009), Vitamin D deficiency (6/16/2017), and Weakness due to cerebrovascular accident. Past Surgical History:  
 
 has a past surgical history that includes hx colectomy; hx hysterectomy; and hx other surgical. 
 
Home Medications:  
 
Prior to Admission medications Medication Sig Start Date End Date Taking? Authorizing Provider  
furosemide (LASIX) 40 mg tablet Take 1 Tab by mouth two (2) times a day. 8/16/20  Yes Brigid Kirby, DO  
lisinopriL (PRINIVIL, ZESTRIL) 5 mg tablet Take 1 Tab by mouth daily. 8/17/20  Yes Brigid Kirby, DO  
carvediloL (Coreg) 6.25 mg tablet Take 6.25 mg by mouth two (2) times daily (with meals). Yes Provider, Historical  
clopidogreL (PLAVIX) 75 mg tab TAKE ONE TABLET BY MOUTH DAILY 6/2/20  Yes Mark Fletcher MD  
cholecalciferol (Vitamin D3) 25 mcg (1,000 unit) cap Take 1,000 Units by mouth daily. Yes Provider, Historical  
glucosam/wesley-msm1/C/bassam/bosw (OSTEO BI-FLEX TRIPLE STRENGTH PO) Take 2 Tabs by mouth daily.    Yes Yumi William MD  
 evolocumab (Repatha SureClick) pen injection 896 mg by SubCUTAneous route every fourteen (14) days. Yes Provider, Historical  
insulin aspart U-100 (NOVOLOG FLEXPEN U-100 INSULIN) 100 unit/mL (3 mL) inpn by SubCUTAneous route Before breakfast, lunch, dinner and at bedtime. 2-3 units depending on blood sugar before meals. (Note: son states very sensitive.)   Yes Provider, Historical  
multivitamin (ONE A DAY) tablet Take 1 Tab by mouth daily. Yes Provider, Historical  
insulin degludec (TRESIBA FLEXTOUCH U-100) 100 unit/mL (3 mL) inpn 14 Units by SubCUTAneous route daily. Yes Provider, Historical  
nitroglycerin (NITROSTAT) 0.4 mg SL tablet 1 Tab by SubLINGual route as needed for Chest Pain. Up to 3 doses. Patient taking differently: 1 Tab by SubLINGual route every five (5) minutes as needed for Chest Pain. Up to 3 doses. 18  Yes Vito Aparicio MD  
levothyroxine (SYNTHROID) 88 mcg tablet Take 88 mcg by mouth Daily (before breakfast). Yes Provider, Historical  
aspirin delayed-release 81 mg tablet Take 81 mg by mouth daily. Yes Provider, Historical  
OMEGA-3 FATTY ACIDS/FISH OIL (OMEGA 3 FISH OIL PO) Take  by mouth daily. Yes Provider, Historical  
 
 
Allergies/Social/Family History: Allergies Allergen Reactions  Statins-Hmg-Coa Reductase Inhibitors Other (comments) Intolerant to statins  Sulfa (Sulfonamide Antibiotics) Other (comments)  
  syncope Social History Tobacco Use  Smoking status: Never Smoker  Smokeless tobacco: Never Used Substance Use Topics  Alcohol use: No  
  
Family History Problem Relation Age of Onset  Diabetes Mother  Stroke Mother  Diabetes Sister  Other Sister   
     fell and hit her head -  of this  Diabetes Brother  Cancer Father   
     stomach  Diabetes Sister Review of Systems:  
 
Constitutional: positive for confusion, lethargy Eyes: negative Respiratory: negative Cardiovascular: negative Gastrointestinal: negative Genitourinary:negative Integument/breast: negative Hematologic/lymphatic: negative Musculoskeletal:negative Neurological: positive for memory problems and confusion Behavioral/Psych: negative Objective:  
Vital Signs: 
Visit Vitals /50 Pulse (!) 108 Temp 97.9 °F (36.6 °C) Resp 19 Ht 5' 1\" (1.549 m) Wt 66.7 kg (147 lb 0.8 oz) SpO2 96% BMI 27.78 kg/m² O2 Flow Rate (L/min): 2 l/min O2 Device: Room air Temp (24hrs), Av.3 °F (36.8 °C), Min:97.7 °F (36.5 °C), Max:99.5 °F (37.5 °C) CVP (mmHg): 17 mmHg (20 0600) Intake/Output:  
 
Intake/Output Summary (Last 24 hours) at 2020 4375 Last data filed at 2020 0500 Gross per 24 hour Intake 1877.69 ml Output 1015 ml Net 862.69 ml Physical Exam: 
 
General:  Alert, cooperative, well noursished, well developed, appears stated age Eyes:  Sclera anicteric. Pupils equally round and reactive to light. Mouth/Throat: Mucous membranes normal, oral pharynx clear Neck: Supple Lungs:   Clear to auscultation bilaterally, good effort CV:  Regular rate and rhythm,no murmur, click, rub or gallop Abdomen:   Soft, non-tender. bowel sounds normal. non-distended Extremities: No cyanosis or edema Skin: Open wound under L breast (erythematous, ulcerated) Lymph nodes: Cervical and supraclavicular normal  
Musculoskeletal: No swelling or deformity Lines/Devices:  Intact, no erythema, drainage or tenderness Psych: Oriented to Person, Place, Time LABS AND  DATA: Personally reviewed Recent Labs  
  20 
0358 WBC 11.6* 7.8 HGB 8.9* 9.0*  
HCT 29.1* 28.5*  
 182 Recent Labs  
  20 
0424 20 
0358 20 
0534  20 
2121  135*  --    < > 135* K 4.7 5.1  --    < > 4.7  106  --    < > 105 CO2 20* 19*  --    < > 20* BUN 63* 66*  --    < > 51* CREA 1.97* 2.05*  --    < > 1.83* * 237*  --    < > 186* CA 7.3* 7.1*  --    < > 7.4* MG  --   --  2.0  --  2.0 PHOS  --   --   --   --  2.9  
 < > = values in this interval not displayed. Recent Labs 08/17/20 2121 * TP 6.4 ALB 2.3*  
GLOB 4.1* Recent Labs 08/17/20 2121 INR 1.3* PTP 12.8* No results for input(s): PHI, PCO2I, PO2I, FIO2I in the last 72 hours. Recent Labs 08/18/20 
1511 08/18/20 
0533 TROIQ 2.55* 2.78* MEDS: Reviewed Chest X-Ray: CXR Results  (Last 48 hours) None ECHO (8/17/2020): 
Result status: Final result · LV: Estimated LVEF is 20 - 25% with severe global, anteroapical hypokinesis. · AV: Mild aortic valve stenosis is present. Aortic valve mean gradient is 12 mmHg. · MV: Mild to moderate mitral valve regurgitation is present. · Pericardium: Small circumferential pericardial effusion. No indications of tamponade present. Multidisciplinary Rounds Completed: Yes ABCDEF Bundle/Checklist Completed: 
Yes SPECIAL EQUIPMENT None DISPOSITION Stay in ICU CRITICAL CARE CONSULTANT NOTE I had a face to face encounter with the patient, reviewed and interpreted patient data including clinical events, labs, images, vital signs, I/O's, and examined patient. I have discussed the case and the plan and management of the patient's care with the consulting services, the bedside nurses and the respiratory therapist.   
 
NOTE OF PERSONAL INVOLVEMENT IN CARE This patient has a high probability of imminent, clinically significant deterioration, which requires the highest level of preparedness to intervene urgently. I participated in the decision-making and personally managed or directed the management of the following life and organ supporting interventions that required my frequent assessment to treat or prevent imminent deterioration. I personally spent 65 minutes of critical care time.   This is time spent at this critically ill patient's bedside actively involved in patient care as well as the coordination of care and discussions with the patient's family. This does not include any procedural time which has been billed separately. Mukul [facility]

## 2020-08-20 NOTE — PROGRESS NOTES
1930 Received verbal bedside report from ARIANA Minaya. Assumed care of the pt.   
 
0200 Pt anxious, unable to sleep. Son states, Angeil Holley is fixed on taking a pill from home for nausea. \"  Pt states she is not currently nauseous. Zofran and compazine were given earlier for nausea. Pt continuously asking for her home medication. Discussed home medications with pt's son. States he does not know what medication the pt is referring to. Dr. Chalino Hardy, intensivist paged. Orders received for melatonin.  
 
0600 RN in pt's room to give morning medications. Pt pulled central line dressing off. RN returned to the room with central line dressing kit and found central line on the bed, pressure held on R neck bleeding stopped. 0730 Bedside and Verbal shift change report given to Chato Verduzco RN (oncoming nurse) by Stevenson Lawrence RN (offgoing nurse). Report included the following information SBAR, Kardex, ED Summary, Procedure Summary, Intake/Output, MAR, Recent Results and Cardiac Rhythm ST.

## 2020-08-20 NOTE — PROGRESS NOTES
Cardiology Progress Note Admit Date: 8/16/2020 Admit Diagnosis: Heart failure ([address] [I50.9] Date: 8/20/2020     Time: 9:30 AM 
Assessment/Plan/Discussion:Cardiology Attending:  
 
Patient seen on the day of progress note and examined  and agree with Advance Practice Provider (BK, NP,PA)  assessment and plans. Brennan Light is a 82 y.o. female Patient seen in late morning with nurse practitioner and spoke to patient's son, the ICU doctor and nurse. She is more short of breath and while yesterday was maintaining her sats; today has dropped her sats and is on some oxygen with a relatively low # of L but  she has sats of 100% however. She appears more dyspneic and her x-ray looks worse. She has some moderate lower extremity edema. She is off pressors but requiring a small dose of Midrin. I discussed with family which has mainly concerns about her's lack of sleep, that this is forward output failure and congestion. The dyspnea is likely related to the very poor LV function going forward and that is related to the LAD occlusions. She is a poor candidate to undergo operative or invasive Procedures. We will continue with plans for medical therapy. I will add milrinone and she was given a dose of Lasix we will give her 40 mg IV twice daily as much as her blood pressure tolerates. We will start the milrinone 0.2 and uptitrate if she tolerates. She had one brief episode of atrial fibrillation today. This note was created using voice recognition software. Despite editing, there may be syntax errors. Sonam Proctor MD   
 
 
 
Subjective: 
 Early this a.m., pt was anxious, restless and pulled out her PICC line. She went into afib around 8AM but self converted approximately 3 minutes later before IV amiodarone bolus was given.   She has remained in Sinus tachycardia. She has no history of Afib. She was also SOB this a.m. with rising proBNP and was given 40 mg lasix IV at 0700 this a.m. Her midodrine was reduced to 10 mg TID and bp is improved. PICC was placed and post procedure CXR with worsened intersitial opacities and noted bilateral pleural effusions. Ms. Aline Patel states she had some chest discomfort last night but also states she has had general discomfort \"throughout her body. \"  Currently denies chest pain. Still with some SOB. She did not sleep well due to SOB last night. Melatonin not effective. Sats 100% on RA. Assessment and Plan 1. AFIB: new. Brief episode this a.m (approx 3 minutes) 
 -No further Afib 
 -Off coreg due to Low BP, requiring midodrine. -Given brief episode and no prior history of afib, will monitor for now. No anticoagulation neededat this time. Besides is poor candidate given chronic anemia. 2. Hypotension/shock  
 -BP improved/lnormalized on midodrine. Off IV vasopressors 3. Acute on chronic systolic CHF: EF 58-69% 4/44/18, (was 25% (8/14/20) -NYHA class IV 
            -ProBNP rising, now >35,000, SOB worsened this a.m. -CXR post PICC with worsened edema. 
 -520 ml out since Lasix 40 IV this a.m.  
 -Trial inotrope- milrinone 0.2 mc/kg/min, start now. Increase dose if tolerates. -Will Repeat Lasix 40 IV at 1500 today, plan BID dosing if tolerates- watch creatinine. 
             -Coreg on hold (on midodrine to support BP) -Lisinopril on hold given rise in creatinine and low BP. 
  
4. CAD/Elevated troponin due to NSTEMI and d/t increased myocardial demand.  
 -Troponin trended back down, now 2.55 yesterday afternoon.  
 -Echo with anteroapical hypokinesis, severe 
 -Repeat EKG this a. m.reviewed with , mildly worse ST abnormality in V3,(has known LAD lesion) 
             -No current chest pain. -ASA, plavix, statin. -BB on hold as above -Cath 2018 severe lesion in the proximal LAD next to the  left main, a long lesion all throughout the mid RCA and several sequential lesions in the circumflex  
 -Not a candidate for CABG or PCI in 2018 or now. 5. JEROD on CKD: creatinine slightly improved from yesterday Lab Results Component Value Date/Time Creatinine 1.97 (H) 08/20/2020 04:24 AM  
 -Hold lisinopril. 6. Bilateral pleural effusions: R>L 
            -defer to primary team if tap warranted. 
              
7. Small Pericardial effusion: by TTE 8/17/20 
 -No tamponade. No intervention needed 8. Chronic anemia:   
 -Stable now. Lab Results Component Value Date/Time HGB 8.9 (L) 08/20/2020 04:24 AM  
 
 
9. Mild Aortic stenosis/ Mild-mod MR   
 -AS tighter on planimetry but may reflect low gradient AS due to CHF           
  
10. PFO:  
 -noted again on repeat echo Left to right shunt. 
  
11. Hx of HTN:  bp normalized on midodrine 
            -holding coreg, lisinopril Other comorbids: 
12. UTI:   
 -On recephin/Vanc, management per primary team. 
 
13.. Hx of COVID 19 June 2020 
            -Repeat covid test 8/17/20 again negative 14. Metastatic breast cancer with liver lesion and osseous metastatic lesions. -hx of Tumor erosion of left breast, causing bleeding  
 -Had XRT to liver lesions per son's report 
            -Follows with Dr. Kelley Hall at [facility]. 
            -Per chart, Has scheduled appt at [facility] for possible excision 15. DM type II: A1C 8.8:  
 -Defer to primary team 
16. Advanced directives:  
 -Full code status. .  
  
SOB worsened overnight. She remains dyspneic now on rounds despite lasix 40 mg IV this a.m. CXR worsened edema. Will start milrinone at low dose and repeat IV lasix at 3PM today. Milrinone may lower her BP but will trial and increase dose if tolerates. Noted episode of Afib this a.m. which resolved before IV amiodarone was given. She remains in NSR and she has had no prior history of afib. Will monitor for now. Updated son at bedside. Dr. Shruti Cordova discussed with intensivist. 
 
 
HPI: 82 y.o. female with PMH of significant multivessel CAD, systolic CHF, CKD, colon cancer, DM, HTN, metastatic breast cancer with liver lesion, CVA, COVID 23, osteoporosis, RA, anemia. Was discharged 8/16/20 after hospitalization for CHF exacerbation and elevated troponin with noted further reduction in EF to 25%. Presented again in evning 8/16/20 with reported fever and SOB with family ocncnerned for pulmonary edema. Notable bilateral pleural effusions on Xray (R>L). Troponin elevated but less than last admission. Received trial of lasix IV with improvement in SOB but bp low-marginal. Pt SOB was improved in afternoon. Had brief episode of pulselessness ? PEA on 8/17/20 requiring 1 round of CPR and epinephrine. Was intubated but quickly extubated. Concern for STEMI but upon further review of EKG, no STEMI. Renal function worsening. Past Medical History:  
Diagnosis Date  Acute on chronic systolic HF (heart failure) ([address] 5/19/2018  Age-related osteoporosis without current pathological fracture 9/2/2009  Anemia 9/2/2009  Asymptomatic hyperuricemia 2/16/2017  Breast cancer ([address]  CAD (coronary artery disease) 6/21/2018  Carotid bruit 8/31/2011  CHF (congestive heart failure) ([address]  CKD (chronic kidney disease) stage 3, GFR 30-59 ml/min ([facility]) 10/25/2017  Colon cancer ([address] 9/2/2009  
 surgery/chemo  Colon cancer ([address] 9/2/2009  COVID-19   
 DM (diabetes mellitus) ([address] 9/2/2009  GERD (gastroesophageal reflux disease)  H/O: CVA 9/2/2009  
 slight l sided weakness  Heart attack ([address]  HTN, goal below 140/90 9/2/2009  Hypothyroid 9/2/2009  Ill-defined condition   
 seasonal allergies  Long-term use of immunosuppressant medication 11/21/2016  Metastatic breast cancer ([facility] 6/1/2018  Microalbuminuria 9/2/2009  Osteoporosis 9/2/2009  Other and unspecified hyperlipidemia 1/27/2010  PAD (peripheral artery disease) ([facility] 9/7/2011  Primary osteoarthritis of both knees 9/28/2016  Rheumatoid arthritis involving ankle ([facility] 9/28/2016  Rheumatoid arthritis(714.0) 9/2/2009  Seropositive rheumatoid arthritis of multiple sites ([facility] 9/28/2016  Statin intolerance 12/21/2016  Type 2 diabetes mellitus with neurologic complication, with long-term current use of insulin ([facility] 9/2/2009  Type 2 diabetes with nephropathy ([facility] 8/20/2018  Unspecified essential hypertension 9/2/2009  Vitamin D deficiency 6/16/2017  Weakness due to cerebrovascular accident Social Hx Social History Socioeconomic History  Marital status:  Spouse name: Not on file  Number of children: Not on file  Years of education: Not on file  Highest education level: Not on file Occupational History  Not on file Social Needs  Financial resource strain: Not on file  Food insecurity Worry: Not on file Inability: Not on file  Transportation needs Medical: Not on file Non-medical: Not on file Tobacco Use  Smoking status: Never Smoker  Smokeless tobacco: Never Used Substance and Sexual Activity  Alcohol use: No  
 Drug use: No  
 Sexual activity: Not Currently Partners: Male Lifestyle  Physical activity Days per week: Not on file Minutes per session: Not on file  Stress: Not on file Relationships  Social connections Talks on phone: Not on file Gets together: Not on file Attends [employer]ism service: Not on file Active member of club or organization: Not on file Attends meetings of clubs or organizations: Not on file Relationship status: Not on file  Intimate partner violence Fear of current or ex partner: Not on file Emotionally abused: Not on file Physically abused: Not on file Forced sexual activity: Not on file Other Topics Concern  Not on file Social History Narrative  Not on file Objective: 
  
 Physical Exam: 
             
Visit Vitals /71 Pulse 100 Temp 97.8 °F (36.6 °C) Resp 25 Ht 5' 1\" (1.549 m) Wt 147 lb 0.8 oz (66.7 kg) SpO2 100% BMI 27.78 kg/m² General Appearance:   Well developed,,alert and oriented  Individual in no acute distress. Kendal Grant Ears/Nose/Mouth/Throat:    Hearing grossly normal. 
  
    Neck:  Supple. Chest:    Lungs few faint bibasilar crackles but bases with diminished breath sounds bilaterally. +dyspnea, +use of accessory muscles. Cardiovascular:    Regular rate and rhythm, S1, S2 normal, grade II/VI systolic murmur at LUSB. Abdomen:    Soft, mild +LUQ ttp. Extremities:  No edema bilaterally. BLE SCDs in place Skin:  Warm and dry. Telemetry: NSR Data Review:  
 Labs:   
Recent Results (from the past 24 hour(s)) GLUCOSE, POC Collection Time: 08/19/20  4:52 PM  
Result Value Ref Range Glucose (POC) 323 (H) 65 - 100 mg/dL Performed by Destiny Cain GLUCOSE, POC Collection Time: 08/19/20 10:40 PM  
Result Value Ref Range Glucose (POC) 238 (H) 65 - 100 mg/dL Performed by [facility] NT-PRO BNP Collection Time: 08/20/20  4:24 AM  
Result Value Ref Range NT pro-BNP >35,000 (H) <450 PG/ML  
CBC WITH AUTOMATED DIFF Collection Time: 08/20/20  4:24 AM  
Result Value Ref Range WBC 11.6 (H) 3.6 - 11.0 K/uL  
 RBC 3.37 (L) 3.80 - 5.20 M/uL HGB 8.9 (L) 11.5 - 16.0 g/dL HCT 29.1 (L) 35.0 - 47.0 % MCV 86.4 80.0 - 99.0 FL  
 MCH 26.4 26.0 - 34.0 PG  
 MCHC 30.6 30.0 - 36.5 g/dL RDW 20.1 (H) 11.5 - 14.5 % PLATELET 085 611 - 308 K/uL MPV 10.2 8.9 - 12.9 FL  
 NRBC 0.7 (H) 0  WBC ABSOLUTE NRBC 0.08 (H) 0.00 - 0.01 K/uL NEUTROPHILS 82 (H) 32 - 75 % LYMPHOCYTES 4 (L) 12 - 49 % MONOCYTES 10 5 - 13 % EOSINOPHILS 1 0 - 7 % BASOPHILS 1 0 - 1 % IMMATURE GRANULOCYTES 2 (H) 0.0 - 0.5 % ABS. NEUTROPHILS 9.5 (H) 1.8 - 8.0 K/UL  
 ABS. LYMPHOCYTES 0.5 (L) 0.8 - 3.5 K/UL  
 ABS. MONOCYTES 1.2 (H) 0.0 - 1.0 K/UL  
 ABS. EOSINOPHILS 0.1 0.0 - 0.4 K/UL  
 ABS. BASOPHILS 0.1 0.0 - 0.1 K/UL  
 ABS. IMM. GRANS. 0.2 (H) 0.00 - 0.04 K/UL  
 DF SMEAR SCANNED    
 RBC COMMENTS ANISOCYTOSIS 2+ 
    
 RBC COMMENTS OVALOCYTES 1+ RBC COMMENTS POLYCHROMASIA 1+ 
    
 RBC COMMENTS TEARDROP CELLS 
PRESENT 
    
 RBC COMMENTS SCHISTOCYTES 1+ RBC COMMENTS NRBC,PST METABOLIC PANEL, BASIC Collection Time: 08/20/20  4:24 AM  
Result Value Ref Range Sodium 136 136 - 145 mmol/L Potassium 4.7 3.5 - 5.1 mmol/L Chloride 107 97 - 108 mmol/L  
 CO2 20 (L) 21 - 32 mmol/L Anion gap 9 5 - 15 mmol/L Glucose 233 (H) 65 - 100 mg/dL BUN 63 (H) 6 - 20 MG/DL Creatinine 1.97 (H) 0.55 - 1.02 MG/DL  
 BUN/Creatinine ratio 32 (H) 12 - 20 GFR est AA 29 (L) >60 ml/min/1.73m2 GFR est non-AA 24 (L) >60 ml/min/1.73m2 Calcium 7.3 (L) 8.5 - 10.1 MG/DL PROCALCITONIN Collection Time: 08/20/20  4:24 AM  
Result Value Ref Range Procalcitonin 2.98 ng/mL LACTIC ACID Collection Time: 08/20/20  4:24 AM  
Result Value Ref Range Lactic acid 1.7 0.4 - 2.0 MMOL/L  
MAGNESIUM Collection Time: 08/20/20  4:24 AM  
Result Value Ref Range Magnesium 2.1 1.6 - 2.4 mg/dL EKG, 12 LEAD, INITIAL Collection Time: 08/20/20  8:05 AM  
Result Value Ref Range Ventricular Rate 115 BPM  
 Atrial Rate 115 BPM  
 P-R Interval 140 ms QRS Duration 84 ms Q-T Interval 330 ms QTC Calculation (Bezet) 456 ms Calculated P Axis 52 degrees Calculated R Axis 112 degrees Calculated T Axis 164 degrees Diagnosis Sinus tachycardia Right axis deviation Anteroseptal infarct (cited on or before 25-DEC-2019) When compared with ECG of 18-AUG-2020 15:10, 
 QRS axis shifted right GLUCOSE, POC Collection Time: 08/20/20  8:07 AM  
Result Value Ref Range Glucose (POC) 244 (H) 65 - 100 mg/dL Performed by Whitley Sibley GLUCOSE, POC Collection Time: 08/20/20 11:04 AM  
Result Value Ref Range Glucose (POC) 342 (H) 65 - 100 mg/dL Performed by Whitley Sibley Radiology:  
 
  
Current Facility-Administered Medications Medication Dose Route Frequency  melatonin tablet 3 mg  3 mg Oral QHS  midodrine (PROAMATINE) tablet 10 mg  10 mg Oral TID WITH MEALS  
 alteplase (CATHFLO) 1 mg in sterile water (preservative free) 1 mL injection  1 mg InterCATHeter PRN  
 cefepime (MAXIPIME) 2 g in 0.9% sodium chloride (MBP/ADV) 100 mL  2 g IntraVENous Q24H  Vancomycin Pharmacy Dosing   Other PRN  
 [START ON 8/21/2020] vancomycin (VANCOCIN) 750 mg in 0.9% sodium chloride (MBP/ADV) 250 mL  750 mg IntraVENous Q36H  prochlorperazine (COMPAZINE) with saline injection 10 mg  10 mg IntraVENous Q6H PRN  
 famotidine (PEPCID) tablet 20 mg  20 mg Oral DAILY  sodium chloride (NS) flush 5-40 mL  5-40 mL IntraVENous Q8H  
 sodium chloride (NS) flush 5-40 mL  5-40 mL IntraVENous PRN  
 acetaminophen (TYLENOL) tablet 650 mg  650 mg Oral Q6H PRN Or  
 acetaminophen (TYLENOL) suppository 650 mg  650 mg Rectal Q6H PRN  polyethylene glycol (MIRALAX) packet 17 g  17 g Oral DAILY PRN  
 ondansetron (ZOFRAN) injection 4 mg  4 mg IntraVENous Q6H PRN  
 glucose chewable tablet 16 g  4 Tab Oral PRN  
 glucagon (GLUCAGEN) injection 1 mg  1 mg IntraMUSCular PRN  
 dextrose 10% infusion 0-250 mL  0-250 mL IntraVENous PRN  
 insulin lispro (HUMALOG) injection   SubCUTAneous AC&HS  aspirin delayed-release tablet 81 mg  81 mg Oral DAILY  clopidogreL (PLAVIX) tablet 75 mg  75 mg Oral DAILY  [Held by provider] carvediloL (COREG) tablet 6.25 mg  6.25 mg Oral BID  levothyroxine (SYNTHROID) tablet 88 mcg  88 mcg Oral ACB  [Held by provider] lisinopriL (PRINIVIL, ZESTRIL) tablet 5 mg  5 mg Oral DAILY Eliecer Montana, [facility] [address], [address] [facility], [facility] 
 [phone]

## 2020-08-20 NOTE — PROGRESS NOTES
Pt continues to deny chest pain but still has SOB. O2 sat on room air 96% but 99% on 2.5 L NC. Sinus tachycardia with PACs/PVCs, . /65. U/O net neg 187 mls since 7AM.  Reviewed with Dr. Maykel Medellin, will increase dose of milrinone to 0.375 mcg/kg/min

## 2020-08-20 NOTE — PROGRESS NOTES
Physical Therapy Chart reviewed in preparation for therapy visit. Arrived to patient's room with son and RN at bedside. Son notes patient did not sleep well last night, anxious and SOB today. He declined therapy session, requested therapy check back later today. D/w RN. Physical Therapy will defer session. Will check back later today as able or tomorrow. Thank you

## 2020-08-20 NOTE — PROCEDURES
PICC Placement Note PRE-PROCEDURE VERIFICATION Correct Procedure: yes Correct Site:  yes Temperature: Temp: 97.8 °F (36.6 °C), Temperature Source: Temp Source: Oral 
Recent Labs  
  08/20/20 
0424  08/17/20 
2121 BUN 63*   < > 51* CREA 1.97*   < > 1.83*    < > 180 INR  --   --  1.3* WBC 11.6*   < > 8.5  
 < > = values in this interval not displayed. Allergies: Statins-hmg-coa reductase inhibitors and Sulfa (sulfonamide antibiotics) Education materials, including PICC Booklet and written information regarding central catheter related bloodstream infection and prevention given to patient. See Patient Education activity for further details. PROCEDURE DETAIL A double lumen power injectable PICC line was started for realible vascular access. The following documentation is in addition to the PICC properties in the lines/airways flowsheet : 
Lot #: [id number] Xylocaine 1% used intradermally:  yes Total Catheter Length:  35 (cm) External Catheter Length: 0 (cm) Circumference: 27 (cm) Vein Selection for PICC: right basilic Central Line Bundle followed: yes Complication Related to Insertion: none The placement was verified by X-ray. The PICC is on the right side and \"terminates in profile with the SVC. \" 
 
Claudell Donalds is okay to use. Report to Lizzie Pedroza. Monroe Owens, BSN, RN, [facility]  Vascular Access Team

## 2020-08-20 NOTE — PROGRESS NOTES
0730 Bedside and Verbal shift change report given to ARIANA Madden (oncoming nurse) by Abigail Reeys (offgoing nurse). Report included the following information SBAR, Kardex, Intake/Output, MAR and Cardiac Rhythm ST.  
0757 Patient in rapid a fib, BP stable. Telephone orders for amio bolus. EKG obtained. 0497 Patient converted to sinus tach. 0900 PICC team at bedside. Patient placed on 2L NC to lay flat for procedure. 56 Pt son at bedside, very worried about SOB and patient's anxiety. Offered to try to get medications to help with anxiety, son stated \"she doesn't need anxiety medication, I have had more experience with her than any of you and this is indicative of some type of event either lungs or heart. \" Reassured son and patient that vital signs were stable and MD would come speak to them. 1200 Milrinone started. [address] at bedside orders to increase milrinone rate. 1930 Bedside and Verbal shift change report given to ARIANA Conde (oncoming nurse) by Chapin Allen (offgoing nurse). Report included the following information SBAR, Kardex, Intake/Output, MAR and Cardiac Rhythm ST.

## 2020-08-21 NOTE — PROGRESS NOTES
1930 Received verbal bedside report from Ping Acevedo, [facility]. Assumed care of the pt. Pt resting comfortably in bed. 2130 Pt going in and out of A fib in the 150's. Dr. Yanez Asp, intensivist made aware. Orders received for amio drip. 0115 Pt agitated and confused, disoriented to place and difficult to reorient. Vital signs stable. Orders received for 50mg seroquel. 
 
0300 Pt has an increase difficulty of breathing. Discussed ABG with Dr. Yanez Asp, no ABG ordered at this time. Orders received for 60mg lasix. 0320 Pt has an increased work of breathing, continues to be disoriented. Called Dr. Yanez Asp for ABG orders. Order received for ABG. RT called. 0403 ABG obtained. Pt in A fib, Dr. Yanez Asp at the bedside, BP 66/22 (33). Orders received for levophed. Bipap placed on pt.  
 
1930 Bedside and Verbal shift change report given to ARIANA Ellison (oncoming nurse) by [facility], RN (offgoing nurse). Report included the following information SBAR, Kardex, OR Summary, Intake/Output, MAR, Recent Results and Cardiac Rhythm A fib.

## 2020-08-21 NOTE — PROGRESS NOTES
Physical Therapy Chart reviewed in preparation for therapy session. Noted recent events (worsening mental status, dyspnea, BIPAP, A-fib w/ possible cardio conversion). Discussed w/ RN who advised to defer therapy today secondary to change in status. Therapy will follow up next week. Recommend with nursing patient to complete as able in order to maintain strength, endurance and independence once Egress Test complete: OOB to chair 3x/day as able/ appropriate. Thank you for your assistance.

## 2020-08-21 NOTE — PROGRESS NOTES
[facility] 
 
ICU TEAM Progress Note Name: Rose Foley : 1935 MRN: [id number] Date: 2020 I Subjective:  
Progress Note: 2020 Reason for ICU Admission: Respiratory failure, status post cardiac arrest 
 
Interval history: 29-year-old female with extensive medical history including diffuse coronary artery disease with systolic heart failure with ejection fraction of 25%, metastatic breast cancer, history of colon cancer and a plethora of other medical problem readmitted to the hospital on  after being discharged for less than 24-hour. Came in with increased work of breathing and some hypoxia with hypotension. Found to have worsening pleural effusion and pericardial effusion and sustained a brief cardiac arrest requiring a brief period of intubation on the evening of the . Now in the ICU profoundly debilitated receiving antibiotic diuretics and now on low-dose norepinephrine milrinone and was started on amiodarone drip as well. Usually on nasal cannula but requiring BiPAP at times. Despite multiple discussion with the family they changed her CODE STATUS and goals of care to full code. Overnight Events:  
Patient had acute A. fib with RVR had to be started on amiodarone drip and placed on BiPAP for increased work of breathing. She was kept n.p.o. This was associated with hypotension and diuretics was placed on hold and norepinephrine dose was increased. Active Problem List:  
 
Problem List  Date Reviewed: 2020 Codes Class Heart failure ([facility] ICD-10-CM: I50.9 ICD-9-CM: 428.9 CHF (congestive heart failure) ([facility]) ICD-10-CM: I50.9 ICD-9-CM: 428.0 Bacteremia due to Gram-negative bacteria ICD-10-CM: R78.81 ICD-9-CM: 790.7, 041.85   
   
 UTI (urinary tract infection) ICD-10-CM: N39.0 ICD-9-CM: 599.0 Septic shock (HCC) ICD-10-CM: A41.9, R65.21 ICD-9-CM: 038.9, 785.52, 995.92   
   
 Dizziness ICD-10-CM: C18 ICD-9-CM: 780.4 Type 2 diabetes with nephropathy (HCC) ICD-10-CM: E11.21 
ICD-9-CM: 250.40, 583.81   
   
 CAD (coronary artery disease) ICD-10-CM: I25.10 ICD-9-CM: 414.00 Metastatic breast cancer ([facility] ICD-10-CM: S22.810 ICD-9-CM: 174.9 CKD (chronic kidney disease) stage 3, GFR 30-59 ml/min ([facility]) ICD-10-CM: N18.3 ICD-9-CM: 946. 3 Vitamin D deficiency ICD-10-CM: E55.9 ICD-9-CM: 268.9 Asymptomatic hyperuricemia ICD-10-CM: E79.0 ICD-9-CM: 790.6 Statin intolerance ICD-10-CM: Z78.9 ICD-9-CM: 995.27 Long-term use of immunosuppressant medication ICD-10-CM: Z79.899 ICD-9-CM: V58.69 Seropositive rheumatoid arthritis of multiple sites [facility]) ICD-10-CM: M05.79 ICD-9-CM: 714.0 Primary osteoarthritis of both knees ICD-10-CM: M17.0 ICD-9-CM: 715.16 Weakness due to cerebrovascular accident ICD-10-CM: [id number] ICD-9-CM: [id number] PAD (peripheral artery disease) ([facility]) ICD-10-CM: I73.9 ICD-9-CM: 443.9 Carotid bruit ICD-10-CM: R09.89 ICD-9-CM: 184. 9 Hyperlipidemia ICD-10-CM: E78.5 ICD-9-CM: 272.4 Type 2 diabetes mellitus with neurologic complication, with long-term current use of insulin ([facility]) ICD-10-CM: E11.49, Z79.4 ICD-9-CM: 250.60, V58.67 Colon cancer [facility]) ICD-10-CM: C18.9 ICD-9-CM: 153.9 Age-related osteoporosis without current pathological fracture ICD-10-CM: M81.0 ICD-9-CM: 733.01   
   
 HTN, goal below 140/90 ICD-10-CM: I10 
ICD-9-CM: 401.9 Anemia ICD-10-CM: D64.9 ICD-9-CM: 285.9 Past Medical History:  
 
 has a past medical history of Acute on chronic systolic HF (heart failure) ([facility] (5/19/2018), Age-related osteoporosis without current pathological fracture (9/2/2009), Anemia (9/2/2009), Asymptomatic hyperuricemia (2/16/2017), Breast cancer ([facility] CAD (coronary artery disease) (6/21/2018), Carotid bruit (8/31/2011), CHF (congestive heart failure) ([facility] CKD (chronic kidney disease) stage 3, GFR 30-59 ml/min ([facility] (10/25/2017), Colon cancer ([facility] (9/2/2009), Colon cancer ([facility] (9/2/2009), COVID-19, DM (diabetes mellitus) ([facility] (9/2/2009), GERD (gastroesophageal reflux disease), H/O: CVA (9/2/2009), Heart attack ([facility] HTN, goal below 140/90 (9/2/2009), Hypothyroid (9/2/2009), Ill-defined condition, Long-term use of immunosuppressant medication (11/21/2016), Metastatic breast cancer ([facility] (6/1/2018), Microalbuminuria (9/2/2009), Osteoporosis (9/2/2009), Other and unspecified hyperlipidemia (1/27/2010), PAD (peripheral artery disease) ([facility] (9/7/2011), Primary osteoarthritis of both knees (9/28/2016), Rheumatoid arthritis involving ankle ([facility] (9/28/2016), Rheumatoid arthritis(714.0) (9/2/2009), Seropositive rheumatoid arthritis of multiple sites ([facility] (9/28/2016), Statin intolerance (12/21/2016), Type 2 diabetes mellitus with neurologic complication, with long-term current use of insulin ([facility] (9/2/2009), Type 2 diabetes with nephropathy ([facility] (8/20/2018), Unspecified essential hypertension (9/2/2009), Vitamin D deficiency (6/16/2017), and Weakness due to cerebrovascular accident. Past Surgical History:  
 
 has a past surgical history that includes hx colectomy; hx hysterectomy; and hx other surgical. 
 
Home Medications:  
 
Prior to Admission medications Medication Sig Start Date End Date Taking? Authorizing Provider  
furosemide (LASIX) 40 mg tablet Take 1 Tab by mouth two (2) times a day. 8/16/20  Yes Brigid Kirby, DO  
lisinopriL (PRINIVIL, ZESTRIL) 5 mg tablet Take 1 Tab by mouth daily. 8/17/20  Yes Brigid Kirby, DO  
carvediloL (Coreg) 6.25 mg tablet Take 6.25 mg by mouth two (2) times daily (with meals).    Yes Provider, Historical  
clopidogreL (PLAVIX) 75 mg tab TAKE ONE TABLET BY MOUTH DAILY 6/2/20  Yes Gemini Arteaga MD  
 cholecalciferol (Vitamin D3) 25 mcg (1,000 unit) cap Take 1,000 Units by mouth daily. Yes Provider, Historical  
glucosam/wesley-msm1/C/bassam/bosw (OSTEO BI-FLEX TRIPLE STRENGTH PO) Take 2 Tabs by mouth daily. Yes Shelli Gutierrez MD  
evolocumab (Repatha SureClick) pen injection 063 mg by SubCUTAneous route every fourteen (14) days. Yes Provider, Historical  
insulin aspart U-100 (NOVOLOG FLEXPEN U-100 INSULIN) 100 unit/mL (3 mL) inpn by SubCUTAneous route Before breakfast, lunch, dinner and at bedtime. 2-3 units depending on blood sugar before meals. (Note: son states very sensitive.)   Yes Provider, Historical  
multivitamin (ONE A DAY) tablet Take 1 Tab by mouth daily. Yes Provider, Historical  
insulin degludec (TRESIBA FLEXTOUCH U-100) 100 unit/mL (3 mL) inpn 14 Units by SubCUTAneous route daily. Yes Provider, Historical  
nitroglycerin (NITROSTAT) 0.4 mg SL tablet 1 Tab by SubLINGual route as needed for Chest Pain. Up to 3 doses. Patient taking differently: 1 Tab by SubLINGual route every five (5) minutes as needed for Chest Pain. Up to 3 doses. 6/6/18  Yes Shirley Cee MD  
levothyroxine (SYNTHROID) 88 mcg tablet Take 88 mcg by mouth Daily (before breakfast). Yes Provider, Historical  
aspirin delayed-release 81 mg tablet Take 81 mg by mouth daily. Yes Provider, Historical  
OMEGA-3 FATTY ACIDS/FISH OIL (OMEGA 3 FISH OIL PO) Take  by mouth daily. Yes Provider, Historical  
 
 
Allergies/Social/Family History: Allergies Allergen Reactions  Statins-Hmg-Coa Reductase Inhibitors Other (comments) Intolerant to statins  Sulfa (Sulfonamide Antibiotics) Other (comments)  
  syncope Social History Tobacco Use  Smoking status: Never Smoker  Smokeless tobacco: Never Used Substance Use Topics  Alcohol use: No  
  
Family History Problem Relation Age of Onset  Diabetes Mother  Stroke Mother  Diabetes Sister  Other Sister fell and hit her head -  of this  Diabetes Brother  Cancer Father   
     stomach  Diabetes Sister Review of Systems:  
 
Not able to obtain due to her medical condition Objective:  
Vital Signs: 
Visit Vitals BP 99/54 Pulse 77 Temp 97.7 °F (36.5 °C) Resp 24 Ht 5' 1\" (1.549 m) Wt 67.6 kg (149 lb 0.5 oz) SpO2 96% BMI 28.16 kg/m² O2 Flow Rate (L/min): 2 l/min O2 Device: BIPAP Temp (24hrs), Av.9 °F (36.6 °C), Min:97.4 °F (36.3 °C), Max:98.9 °F (37.2 °C) CVP (mmHg): 17 mmHg (20 0600) Intake/Output:  
 
Intake/Output Summary (Last 24 hours) at 2020 9092 Last data filed at 2020 0700 Gross per 24 hour Intake 1327.32 ml Output 1435 ml Net -107.68 ml Physical Exam: 
General:  Alert, confused, does not seem to be in distress Eyes:  Sclera anicteric. Pupils equally round and reactive to light. Mouth/Throat: Mucous membranes normal, oral pharynx clear Neck: Supple Lungs:    Good air entry bilaterally, fine crepitation bilaterally no wheezing CV:   Irregular rate and rhythm,no murmur, click, rub or gallop Abdomen:   Soft, non-tender. bowel sounds normal. non-distended Extremities: No cyanosis or edema Skin: Open wound under L breast (erythematous, ulcerated) Lymph nodes: Cervical and supraclavicular normal  
Musculoskeletal:  +1 edema Lines/Devices:  Intact, no erythema, drainage or tenderness Psych: Oriented to Person, otherwise confused, moves 4 limbs, generalized weakness and debility LABS AND  DATA: Personally reviewed Recent Labs  
  20 
03220 
0424 WBC 12.7* 11.6* HGB 8.5* 8.9* HCT 27.6* 29.1*  
 197 Recent Labs  
  20 
0326 20 
0424 * 136  
K 4.8 4.7  107 CO2 20* 20* BUN 63* 63* CREA 1.94* 1.97* * 233* CA 7.6* 7.3*  
MG  --  2.1 No results for input(s): AP, TBIL, TP, ALB, GLOB, AML, LPSE in the last 72 hours. No lab exists for component: SGOT, GPT, AMYP No results for input(s): INR, PTP, APTT, INREXT in the last 72 hours. Recent Labs  
  08/21/20 
5151 PHI 7.35  
PCO2I 28.8*  
PO2I 106* Recent Labs 08/18/20 
1511 TROIQ 2.55* Hemodynamics:  
PAP:   CO:    
Wedge:   CI:    
CVP:  CVP (mmHg): 17 mmHg (08/20/20 0600) SVR:    
  PVR:    
 
Ventilator Settings: 
Mode Rate Tidal Volume Pressure FiO2 PEEP  
         30 % Peak airway pressure:     
Minute ventilation: 9.6 l/min MEDS: Reviewed Chest X-Ray: CXR Results  (Last 48 hours) 08/20/20 1000  XR CHEST PORT Final result Impression:  IMPRESSION:  
   
No pneumothorax. Satisfactory right PICC placement. Stable pleural effusions and  
diffuse interstitial opacities. Stable bony metastases. Narrative:  EXAM:  XR CHEST PORT INDICATION: Right PICC placement COMPARISON: 8/17/2020 TECHNIQUE: Portable AP upright chest view at 0948 hours FINDINGS: The right PICC terminates in profile with the SVC. The right IJ  
catheter has been removed. Cardiac monitoring wires overlie the thorax. The  
cardiomediastinal contours are stable. There are stable pleural effusions and diffuse interstitial opacities. There is  
no pneumothorax. Scattered bony metastases are again noted. ECHO: 
· LV: Estimated LVEF is 20 - 25% with severe global, anteroapical hypokinesis. · AV: Mild aortic valve stenosis is present. Aortic valve mean gradient is 12 mmHg. · MV: Mild to moderate mitral valve regurgitation is present. · Pericardium: Small circumferential pericardial effusion. No indications of tamponade present. Assessment and Plan: 1. PEA Arrest (17 Aug 2020 - CPR - ROSC) a. STEMI 
b. CAD, diffuse coronary artery disease not amenable to intervention 
c. Pericardial Effusion 
d. Acute on Chronic CHF, worsening ejection fraction now 20 to 25% 
i. Bilateral Pleural Effusions 
e. Anemia 2. Metastatic Breast Cancer (radiation treatment) a. Open Wound Under Left Breast (prior to admission) 3. Septic Shock (from UTI prior to admission) a. UTI (multiple Klebsiella UTIs in past) 4. DM2 
5. Colon Cancer 6. Acute kidney injury on top of chronic kidney disease stage III-IV 7. Encephalopathy [ICU delirium/toxic/metabolic]  Patient main issue is her cardiomyopathy which is related to obstructive coronary artery disease but unfortunately not amenable to intervention giving her metastatic cancer and overall poor functional status. Patient developed A. fib with RVR last night and was started on amiodarone drip. I appreciate cardiology input regarding possible cardioversion. Methadone dose was increased yesterday and she still requiring norepinephrine on top of oral Midodrin.  Day 5 of broad-spectrum antibiotic. Discontinue vancomycin, will continue cefepime empirically for another 2 days.  Currently diuretics on hold, pending x-ray this morning, will resume based on her hemodynamics and respiratory status as well as urine output and renal function In the absence of any plan for cardiac intervention, given her metastatic cancer and poor functional status her prognosis is very poor. I believe it is reasonable to address with the family again goals of care and CODE STATUS. Addendum: 
I had a lengthy discussion with the patient's son, he is aware of the current condition and the progressive cardiogenic shock. At this time he would like to keep the CODE STATUS full code. DISPOSITION Stay in ICU CRITICAL CARE CONSULTANT NOTE I had a face to face encounter with the patient, reviewed and interpreted patient data including clinical events, labs, images, vital signs, I/O's, and examined patient.   I have discussed the case and the plan and management of the patient's care with the consulting services, the bedside nurses and the respiratory therapist.   
 
 NOTE OF PERSONAL INVOLVEMENT IN CARE This patient has a high probability of imminent, clinically significant deterioration, which requires the highest level of preparedness to intervene urgently. I participated in the decision-making and personally managed or directed the management of the following life and organ supporting interventions that required my frequent assessment to treat or prevent imminent deterioration. I personally spent 60 minutes of critical care time. This is time spent at this critically ill patient's bedside actively involved in patient care as well as the coordination of care and discussions with the patient's family. This does not include any procedural time which has been billed separately. Oriln Moore M.D. Staff Intensivist/Pulmonologist 
[facility] 8/21/2020

## 2020-08-21 NOTE — CONSULTS
[address] [facility] 
 
FOLLOW-UP NOTE Presentation Maribell Gamez is a 82 y.o. female with a PMH of chronic heart failure, anemia, advanced metastatic breast cancer, CAD, CKD, colon cancer, COVID-19 pneumonia, type two diabetes, GERD, CVA, hypothyroidism, HLD, and rheumatoid arthritis who presented to the ED 8/16/20 with a c/o fevers and shortness of breath. She was admitted with acute respiratory failure, acute on chronic CHF with elevated troponins. DX: Acute respiratory failure, UTI with septic shock, PEA arrest, STEMI 
 
TX: IV antibiotics, diuresis, respiratory support, midodrine Current clinical course has been complicated by PEA arrest and septic shock. Diabetes: Patient has a 50 year history of known type two diabetes, treated with [place] and Humalog PTA. Admitting A1C 8.8% (from 6.7% 12/19). Subjective Fasting glucose 347 Correctional insulin: 22 units Amiodarone, norepinephrine and milrinone started, brief Afib On BiPAP Objective Physical exam 
General Lethargic on BiPAP, eyes closed. Vital Signs Visit Vitals /89 Pulse (!) 126 Temp 97.7 °F (36.5 °C) Resp 25 Ht 5' 1\" (1.549 m) Wt 67.6 kg (149 lb 0.5 oz) SpO2 93% BMI 28.16 kg/m² Skin  Warm and dry. Acanthosis noted along neckline. No lipohypertrophy or lipoatrophy noted at injection sites Heart   Regular rate and rhythm. No murmurs, rubs or gallops Lungs  Clear to auscultation without rales or rhonchi Extremities No foot wounds Laboratory Lab Results Component Value Date/Time Hemoglobin A1c 8.8 (H) 08/12/2020 12:01 PM  
 Hemoglobin A1c (POC) 7.2 03/09/2012 04:16 PM  
 
Lab Results Component Value Date/Time LDL, calculated 31.6 04/16/2018 04:21 AM  
 
Lab Results Component Value Date/Time Creatinine 1.94 (H) 08/21/2020 03:26 AM  
 
Lab Results Component Value Date/Time  Sodium 132 (L) 08/21/2020 03:26 AM  
 Potassium 4.8 08/21/2020 03:26 AM  
 Chloride 102 08/21/2020 03:26 AM  
 CO2 20 (L) 08/21/2020 03:26 AM  
 Anion gap 10 08/21/2020 03:26 AM  
 Glucose 295 (H) 08/21/2020 03:26 AM  
 BUN 63 (H) 08/21/2020 03:26 AM  
 Creatinine 1.94 (H) 08/21/2020 03:26 AM  
 BUN/Creatinine ratio 32 (H) 08/21/2020 03:26 AM  
 GFR est AA 30 (L) 08/21/2020 03:26 AM  
 GFR est non-AA 25 (L) 08/21/2020 03:26 AM  
 Calcium 7.6 (L) 08/21/2020 03:26 AM  
 Bilirubin, total 0.7 08/17/2020 09:21 PM  
 Alk. phosphatase 339 (H) 08/17/2020 09:21 PM  
 Protein, total 6.4 08/17/2020 09:21 PM  
 Albumin 2.3 (L) 08/17/2020 09:21 PM  
 Globulin 4.1 (H) 08/17/2020 09:21 PM  
 A-G Ratio 0.6 (L) 08/17/2020 09:21 PM  
 ALT (SGPT) 191 (H) 08/17/2020 09:21 PM  
 
Lab Results Component Value Date/Time ALT (SGPT) 191 (H) 08/17/2020 09:21 PM  
 
 
Factors affecting BG pattern Factor Dose Comments Diet Consistent Carbohydrate 60 grams/meal   
Drugs: 
Other: IV antibiotics Vasopressor Vancomycin Restarted milrinone and norepinephrine Pain Infection UTI Other: Hypotension   Impaired insulin delivery to the tissues Blood glucose pattern Assessment and Plan Nursing Diagnosis Risk for unstable blood glucose pattern Nursing Intervention Domain 0998 Decision-making Support Nursing Interventions Examined current inpatient diabetes control Explored factors facilitating and impeding inpatient management Identified self-management practices impeding diabetes control Explored corrective strategies with patient and responsible inpatient provider Informed patient of rational for insulin strategy while hospitalized Evaluation Devon Rebolledo is an 82year old female with uncontrolled type two diabetes and multiple other co-morbidities who's clinical course is impacted by her metastatic breast cancer, CAD, CKD, and and septic shock.   At this time her A1C is 8.8% which is directly impacted by her complex medical condition and family wishes for conservative glucose management. At this time, she is quite hyperglycemic to the 300s despite low dose basal and correctional insulin. Overnight, she had a decline in her cardiac output and mental status. Cardiology on board. Recommendations Recommend: 
1. Increase basal insulin to low dose 0.2 units/k units daily. Additional 6 units x1 today. Increase to moderate dose 0.3 units/kg tomorrow if fasting glucose over 200. 
 
2. Continue correctional insulin at normal sensitivity [facility] Billing Code(s) I personally reviewed chart, notes, data and current medications in the medical record, and examined the patient at bedside before making care recommendations. Thank you for including us in their care. I spent 15 minutes in direct patient care today for this patient. Time includes chart review, face to face with patient and collaboration with interdisciplinary care team. 
   
 
Wallace Fung, CNS [facility] Access via [address] [place] [address] [phone]

## 2020-08-21 NOTE — PROGRESS NOTES
MAT 
                                                                       Cardiology Progress Note Admit Date: 8/16/2020 Admit Diagnosis: Heart failure ([facility] [address] [I50.9] Date: 8/21/2020     Time: 9:30 AM 
 
Subjective: 
 Events of overnight reviewed. Pt had continue increased WOB and then worsened mental status overnight. Noted to have delirium and was given seroquel around midnight (she had been experiencing sleep deprivation). Early this a.m. with dyspnea, ABG was 7.35/28/106/18/98% at that time. Bipap was placed. Then developed hypotension after biapap placed, levephed restarted. Had intermittent PAF/MAT with RVR overnight requiring Amio gtt to be started. Still having PAF. MAT with RVR this a.m.  BP has improved on levephed (and midodrine continued). She is less responsive this a.m., on bipap. Assessment and Plan 1. Paroxysmal AFIB/MAT/PSVT: Afib new as of 8/19 
 -Frequent MAT and some RVR PAF overnight/early this a.m. -Off coreg due to Low BP. Try to get renal consult. -[id number]=3.uncertain if you should start anticoagulation at this time as long-term survival likely to be poor. (Anemia and unlikely to change outcome)  
 -Suspect Levophed may be contributing to RVR, wean off as able to keep [id number] or > or MAP 65 
 -Agree with IV amiodarone. 2. Hypotension/shock  
 -Back on levophed at 12 mcgs this a.m. Will change milrinone to dobutamine as above in hope of weaning off Levophed. 3. Acute on chronic systolic CHF: EF 82-44% 0/18/10, (was 25% (8/14/20) -NYHA class IV 
            -ProBNP remains >35,000, SOB worsened overnight requiring Bipap, Lasix 60 mg IV given this a.m. attempt another dose of Lasix 80 to 120 mg IV. -CXR with unchanged edema and pleural effusions 
 -Net neg 47 ml/24 hours.   
 -Concern that milrinone may contribute to hypotension and increased cardiac shunting (pulmonary artery vasodilation). Thus will change to Dobutamine. -Check venous gas to calculate C. O. based on mixed venous oxygen saturation of 58%, cardiac output is likely to be normal.  However the above PICC line may be in the right atrium and left-to-right shunt may lead to overestimation of systemic cardiac output in this case. May consider pulling the PICC line back by about 5 cm and rechecking SVO 2 as a surrogate for cardiac output if patient not a candidate for for a [place] catheter 
   
4. CAD/Elevated troponin due to NSTEMI and d/t increased myocardial demand.  
 -Troponin trended back down, last 2.55 8/19.  
 -Echo with anteroapical hypokinesis, severe 
 -No current chest pain. -ASA, plavix, statin. BB on hold 
 -Cath 2018 severe lesion in the proximal LAD next to the  left main, a long lesion all throughout the mid RCA and several sequential lesions in the circumflex  
 - reviewed cardiac catheterization from 2018. Diffuse multivessel disease. Unlikely to get durable outcome from CABG or PCI. While proximal LAD PCI could be considered as a bailout, given current multisystem failure it would not be preferred particularly given oliguric renal failure 5. JEROD on CKD: Appears to have poor urine output which is concerning. Hold ACE inhibitor's. Try to keep maps above 65 in conjunction with higher dose Lasix to treat oliguria. Lab Results Component Value Date/Time Creatinine 1.94 (H) 08/21/2020 03:26 AM  
 - lisinopril on hold. 6. Bilateral pleural effusions: R>L 
            -defer to primary team if tap warranted. 
              
7. Small Pericardial effusion: by TTE 8/17/20. This appears to be chronic. 
 -No tamponade. No intervention needed 8. Chronic anemia:   
 -Stable now. Lab Results Component Value Date/Time HGB 8.5 (L) 08/21/2020 03:25 AM  
 
 
9. Moderate aortic stenosis/ Mild-mod MR   
 -AS tighter on planimetry but may reflect low gradient AS due to CHF           
  
10.  PFO/ASD:  
 -noted again on repeat echo with notable Left to right shunt. Unfortunately in the setting of moderate aortic stenosis and severe LV dysfunction with elevated LV end-diastolic pressures, left to right shunting may be worse given elevated left-sided pressures. Continue diuresis should help. Given left to right shunt at atrial level,  milrinone  with its selective pulmonary vasodilatation action may lead to further relative reduction of systemic flow with relative increase in pulmonary flow. Hence I have suggested switching to dobutamine instead of milrinone 11. Hx of HTN:  bp better on vasopressors 
            -holding coreg, lisinopril Other comorbids: 
12. UTI:   
 -On cefepime/Vanc, management per primary team. 
 
13.. Hx of COVID 19 June 2020 
            -Repeat covid test 8/17/20 again negative 14. Metastatic breast cancer with liver lesion and osseous metastatic lesions. -hx of Tumor erosion of left breast, causing bleeding  
 -Had XRT to liver lesions per son's report 
            -Follows with Dr. Rhona Alcantara at [facility]. 
            -Per chart, Has scheduled appt at [facility] for possible excision   
  
Events of evening/early a.m.noted. Pt had AMS with some evidence of delirium overnight and increased WOB requiring Bipap. She also required resumption of Levophed. Review of telemetry indicates pt had multiple episodes of MAT/PSVT and PAF with RVR. Amiodarone resumed at 1 but will reduce to 0.5mg/hr. Long discussion and update with pt's Son Manuel Gonzalez. Status is tenous and will see how she responds to change of inotrope over next 48 hours. Plan to give lasix 80 mg again this afternoon if BP >100. Unfortunately, poor response per record to a.m dose of 60 mg. After discussion with the son I explained that if patient does not turnaround in the next 50 to 72 hours, expect extremely grim prognosis.   Even if she does turn around with dobutamine and diuretic therapy she may still have limited survival of probably less than 6 months. HPI: 82 y.o. female with PMH of significant multivessel CAD, systolic CHF, CKD, colon cancer, DM, HTN, metastatic breast cancer with liver lesion, CVA, COVID 19, osteoporosis, RA, anemia. Was discharged 8/16/20 after hospitalization for CHF exacerbation and elevated troponin with noted further reduction in EF to 25%. Presented again in evning 8/16/20 with reported fever and SOB with family ocncnerned for pulmonary edema. Notable bilateral pleural effusions on Xray (R>L). Troponin elevated but less than last admission. Received trial of lasix IV with improvement in SOB but bp low-marginal. Pt SOB was improved in afternoon. Had brief episode of pulselessness ? PEA on 8/17/20 requiring 1 round of CPR and epinephrine. Was intubated but quickly extubated. Concern for STEMI but upon further review of EKG, no STEMI. Renal function worsening. Past Medical History:  
Diagnosis Date  Acute on chronic systolic HF (heart failure) ([address] 5/19/2018  Age-related osteoporosis without current pathological fracture 9/2/2009  Anemia 9/2/2009  Asymptomatic hyperuricemia 2/16/2017  Breast cancer ([address]  CAD (coronary artery disease) 6/21/2018  Carotid bruit 8/31/2011  CHF (congestive heart failure) ([address]  CKD (chronic kidney disease) stage 3, GFR 30-59 ml/min ([facility]) 10/25/2017  Colon cancer ([address] 9/2/2009  
 surgery/chemo  Colon cancer ([address] 9/2/2009  COVID-19   
 DM (diabetes mellitus) ([address] 9/2/2009  GERD (gastroesophageal reflux disease)  H/O: CVA 9/2/2009  
 slight l sided weakness  Heart attack ([address]  HTN, goal below 140/90 9/2/2009  Hypothyroid 9/2/2009  Ill-defined condition   
 seasonal allergies  Long-term use of immunosuppressant medication 11/21/2016  Metastatic breast cancer ([address] 6/1/2018  Microalbuminuria 9/2/2009  Osteoporosis 9/2/2009  Other and unspecified hyperlipidemia 1/27/2010  PAD (peripheral artery disease) ([address] 9/7/2011  Primary osteoarthritis of both knees 9/28/2016  Rheumatoid arthritis involving ankle ([address] 9/28/2016  Rheumatoid arthritis(714.0) 9/2/2009  Seropositive rheumatoid arthritis of multiple sites ([address] 9/28/2016  Statin intolerance 12/21/2016  Type 2 diabetes mellitus with neurologic complication, with long-term current use of insulin ([address] 9/2/2009  Type 2 diabetes with nephropathy ([facility] 8/20/2018  Unspecified essential hypertension 9/2/2009  Vitamin D deficiency 6/16/2017  Weakness due to cerebrovascular accident Social Hx Social History Socioeconomic History  Marital status:  Spouse name: Not on file  Number of children: Not on file  Years of education: Not on file  Highest education level: Not on file Occupational History  Not on file Social Needs  Financial resource strain: Not on file  Food insecurity Worry: Not on file Inability: Not on file  Transportation needs Medical: Not on file Non-medical: Not on file Tobacco Use  Smoking status: Never Smoker  Smokeless tobacco: Never Used Substance and Sexual Activity  Alcohol use: No  
 Drug use: No  
 Sexual activity: Not Currently Partners: Male Lifestyle  Physical activity Days per week: Not on file Minutes per session: Not on file  Stress: Not on file Relationships  Social connections Talks on phone: Not on file Gets together: Not on file Attends Mandaeism service: Not on file Active member of club or organization: Not on file Attends meetings of clubs or organizations: Not on file Relationship status: Not on file  Intimate partner violence Fear of current or ex partner: Not on file Emotionally abused: Not on file Physically abused: Not on file Forced sexual activity: Not on file Other Topics Concern  Not on file Social History Narrative  Not on file Objective: 
  
 Physical Exam: 
             
Visit Vitals /89 Pulse (!) 126 Temp 97.7 °F (36.5 °C) Resp 25 Ht 5' 1\" (1.549 m) Wt 149 lb 0.5 oz (67.6 kg) SpO2 93% BMI 28.16 kg/m² General Appearance:   Well developed, somnulent on bipap. Ears/Nose/Mouth/Throat:    
  
    Neck:  Supple. Chest:    Lungs few faint bibasilar crackles but bases with diminished breath sounds bilaterally. Cardiovascular:    Regular rate and rhythm, S1, S2 normal, grade II/VI systolic murmur at LUSB. Abdomen:    Soft,nontender Extremities:  No edema bilaterally. BLE SCDs in place Skin:  Warm and dry. Telemetry: NSR Data Review:  
 Labs:   
Recent Results (from the past 24 hour(s)) GLUCOSE, POC Collection Time: 08/20/20 11:04 AM  
Result Value Ref Range Glucose (POC) 342 (H) 65 - 100 mg/dL Performed by Volodymyr Kirk GLUCOSE, POC Collection Time: 08/20/20  4:19 PM  
Result Value Ref Range Glucose (POC) 311 (H) 65 - 100 mg/dL Performed by Volodymyr Kirk GLUCOSE, POC Collection Time: 08/20/20  9:12 PM  
Result Value Ref Range Glucose (POC) 271 (H) 65 - 100 mg/dL Performed by [facility] PROCALCITONIN Collection Time: 08/21/20  3:25 AM  
Result Value Ref Range Procalcitonin 0.42 ng/mL LACTIC ACID Collection Time: 08/21/20  3:25 AM  
Result Value Ref Range Lactic acid 1.1 0.4 - 2.0 MMOL/L  
CBC WITH AUTOMATED DIFF Collection Time: 08/21/20  3:25 AM  
Result Value Ref Range WBC 12.7 (H) 3.6 - 11.0 K/uL  
 RBC 3.22 (L) 3.80 - 5.20 M/uL HGB 8.5 (L) 11.5 - 16.0 g/dL HCT 27.6 (L) 35.0 - 47.0 % MCV 85.7 80.0 - 99.0 FL  
 MCH 26.4 26.0 - 34.0 PG  
 MCHC 30.8 30.0 - 36.5 g/dL RDW 20.2 (H) 11.5 - 14.5 % PLATELET 718 686 - 115 K/uL MPV 10.1 8.9 - 12.9 FL  
 NRBC 1.0 (H) 0  WBC ABSOLUTE NRBC 0.13 (H) 0.00 - 0.01 K/uL NEUTROPHILS 77 (H) 32 - 75 % BAND NEUTROPHILS 2 0 - 6 % LYMPHOCYTES 4 (L) 12 - 49 % MONOCYTES 14 (H) 5 - 13 % EOSINOPHILS 0 0 - 7 % BASOPHILS 2 (H) 0 - 1 % MYELOCYTES 1 (H) 0 % IMMATURE GRANULOCYTES 0 %  
 ABS. NEUTROPHILS 10.0 (H) 1.8 - 8.0 K/UL  
 ABS. LYMPHOCYTES 0.5 (L) 0.8 - 3.5 K/UL  
 ABS. MONOCYTES 1.8 (H) 0.0 - 1.0 K/UL  
 ABS. EOSINOPHILS 0.0 0.0 - 0.4 K/UL  
 ABS. BASOPHILS 0.3 (H) 0.0 - 0.1 K/UL  
 ABS. IMM. GRANS. 0.0 K/UL  
 DF MANUAL    
 RBC COMMENTS ANISOCYTOSIS 2+ 
    
 RBC COMMENTS MICROCYTOSIS 1+ 
    
 RBC COMMENTS OVALOCYTES 1+ RBC COMMENTS MACHO CELLS 1+ RBC COMMENTS SCHISTOCYTES 1+ RBC COMMENTS TEARDROP CELLS 
PRESENT 
    
 RBC COMMENTS POLYCHROMASIA 1+ 
    
 RBC COMMENTS NRBC,PST   
NT-PRO BNP Collection Time: 08/21/20  3:26 AM  
Result Value Ref Range NT pro-BNP >35,000 (H) <407 PG/ML  
METABOLIC PANEL, BASIC Collection Time: 08/21/20  3:26 AM  
Result Value Ref Range Sodium 132 (L) 136 - 145 mmol/L Potassium 4.8 3.5 - 5.1 mmol/L Chloride 102 97 - 108 mmol/L  
 CO2 20 (L) 21 - 32 mmol/L Anion gap 10 5 - 15 mmol/L Glucose 295 (H) 65 - 100 mg/dL BUN 63 (H) 6 - 20 MG/DL Creatinine 1.94 (H) 0.55 - 1.02 MG/DL  
 BUN/Creatinine ratio 32 (H) 12 - 20 GFR est AA 30 (L) >60 ml/min/1.73m2 GFR est non-AA 25 (L) >60 ml/min/1.73m2 Calcium 7.6 (L) 8.5 - 10.1 MG/DL  
POC EG7 Collection Time: 08/21/20  4:14 AM  
Result Value Ref Range Calcium, ionized (POC) 1.16 1.12 - 1.32 mmol/L  
 pH (POC) 7.35 7.35 - 7.45    
 pCO2 (POC) 28.8 (L) 35.0 - 45.0 MMHG  
 pO2 (POC) 106 (H) 80 - 100 MMHG  
 HCO3 (POC) 16.0 (L) 22 - 26 MMOL/L Base deficit (POC) 10 mmol/L  
 sO2 (POC) 98 (H) 92 - 97 % Site LEFT RADIAL Device: NASAL CANNULA Flow rate (POC) 4 L/M Allens test (POC) YES Specimen type (POC) ARTERIAL Total resp. rate 22 EKG, 12 LEAD, INITIAL Collection Time: 08/21/20  7:28 AM  
Result Value Ref Range Ventricular Rate 113 BPM  
 Atrial Rate 113 BPM  
 P-R Interval 152 ms QRS Duration 90 ms Q-T Interval 330 ms QTC Calculation (Bezet) 452 ms Calculated P Axis 52 degrees Calculated R Axis 111 degrees Calculated T Axis 147 degrees Diagnosis Sinus tachycardia with occasional premature ventricular complexes Right axis deviation Low voltage QRS Cannot rule out Anteroseptal infarct (cited on or before 25-DEC-2019) When compared with ECG of 21-AUG-2020 04:07, No significant change was found EKG, 12 LEAD, INITIAL Collection Time: 08/21/20  8:32 AM  
Result Value Ref Range Ventricular Rate 154 BPM  
 Atrial Rate 159 BPM  
 QRS Duration 130 ms  
 Q-T Interval 336 ms  
 QTC Calculation (Bezet) 538 ms Calculated R Axis 27 degrees Calculated T Axis 141 degrees Diagnosis Atrial fibrillation with rapid ventricular response Nonspecific intraventricular block Cannot rule out Septal infarct (cited on or before 25-DEC-2019) T wave abnormality, consider lateral ischemia or digitalis effect When compared with ECG of 21-AUG-2020 07:28, Atrial fibrillation has replaced Sinus rhythm QRS duration has increased GLUCOSE, POC Collection Time: 08/21/20  8:46 AM  
Result Value Ref Range Glucose (POC) 347 (H) 65 - 100 mg/dL Performed by [facility]:  
 
  
Current Facility-Administered Medications Medication Dose Route Frequency  NOREPINephrine (LEVOPHED) 8 mg in 5% dextrose 250mL (32 mcg/mL) infusion  0.5-16 mcg/min IntraVENous TITRATE  [START ON 8/27/2020] levothyroxine (SYNTHROID) injection 66 mcg  66 mcg IntraVENous Q24H  
 melatonin tablet 3 mg  3 mg Oral QHS  midodrine (PROAMATINE) tablet 10 mg  10 mg Oral TID WITH MEALS  milrinone (PRIMACOR) 20 MG/100 ML D5W infusion  0.375 mcg/kg/min IntraVENous CONTINUOUS  
  insulin glargine (LANTUS) injection 8 Units  8 Units SubCUTAneous DAILY  amiodarone (CORDARONE) 375 mg/250 mL D5W infusion  0.5-1 mg/min IntraVENous TITRATE  alteplase (CATHFLO) 1 mg in sterile water (preservative free) 1 mL injection  1 mg InterCATHeter PRN  
 cefepime (MAXIPIME) 2 g in 0.9% sodium chloride (MBP/ADV) 100 mL  2 g IntraVENous Q24H  prochlorperazine (COMPAZINE) with saline injection 10 mg  10 mg IntraVENous Q6H PRN  
 famotidine (PEPCID) tablet 20 mg  20 mg Oral DAILY  sodium chloride (NS) flush 5-40 mL  5-40 mL IntraVENous Q8H  
 sodium chloride (NS) flush 5-40 mL  5-40 mL IntraVENous PRN  
 acetaminophen (TYLENOL) tablet 650 mg  650 mg Oral Q6H PRN Or  
 acetaminophen (TYLENOL) suppository 650 mg  650 mg Rectal Q6H PRN  polyethylene glycol (MIRALAX) packet 17 g  17 g Oral DAILY PRN  
 ondansetron (ZOFRAN) injection 4 mg  4 mg IntraVENous Q6H PRN  
 glucose chewable tablet 16 g  4 Tab Oral PRN  
 glucagon (GLUCAGEN) injection 1 mg  1 mg IntraMUSCular PRN  
 dextrose 10% infusion 0-250 mL  0-250 mL IntraVENous PRN  
 insulin lispro (HUMALOG) injection   SubCUTAneous AC&HS  aspirin delayed-release tablet 81 mg  81 mg Oral DAILY  clopidogreL (PLAVIX) tablet 75 mg  75 mg Oral DAILY  [Held by provider] carvediloL (COREG) tablet 6.25 mg  6.25 mg Oral BID  [Held by provider] lisinopriL (PRINIVIL, ZESTRIL) tablet 5 mg  5 mg Oral DAILY Danuta Rubio. [facility] [address] [place] [address], [address] Radha [place] 
 [phone]

## 2020-08-21 NOTE — PROGRESS NOTES
Progress note: 
 
Patient noted to have increased WOB on start of my shift. SpO2 has been % on 2 LPM NC overnight. Around midnight, gave one time dose of 50mg seroquel for hyperactive delirium and concern for sundowning. This seemed to work well and it appeared that Ms. Ledesma was resting better. Around 0400 I was called for worsened mental status and increased WOB. ABG at the time was 7.35/28,106/18. Shortly after application of Bipap for WOB, she had abrupt drop in BP necessitating restarting levophed - which had been weaned off several days prior. Bps improved. ECG was significant for Afib. Amio gtt had been started earlier in the evening for increasing frequency of afib episodes early in the night shift Am unclear as to what has precipitated her decline in mental status over the last 24 hours but I suspect ICU delirium and sleep deprivation. Her changes have been gradual over the course of the last day so I'm less concerned for CVA though she has now had episodes of Afib. She has known b/l pleural effusions which are clearly contributing to her increased WOB but despite these she is compensating for her metabolic acidosis with a MV of ~9-13 LPM on BiPAP - and her Bicarb on chemistry was minimally depressed at 20. She has only been mildly hypertensive the last day - not enough to suspect flash edema. She had 60mg lasix earlier in the morning with minimal response. AM labs were sent immediately prior to this episode this morning and are largely unremarkable save for stably elevated BNP. After about 30 minutes, following institution of BiPAP and levophed, her mental status improved and she was able to follow commands though her attention span remains very short. I am worried about the addition of BiPAP with her known pericardial effusion but at this point I feel it is more beneficial for her WOB. - CXR ordered for 0700 
- Continue Amio gtt - Add Levophed 
- Continue Milrinone - Holding additional diuresis given new pressor requirement - Will continue with BiPap for several hours and reevaluate  
- Requested family bring in patient's home CPAP machine as I feel this will help her comply with positive pressure therapy in the future 
- NPO for possible cardioversion later I personally spent 80 minutes of critical care time with this patient. This is time spent at this critically ill patient's bedside actively involved in patient care as well as the coordination of care and discussions with the patient's family. This does not include any procedural time which has been billed separately.

## 2020-08-21 NOTE — PROGRESS NOTES
0730 Shift report received from [address] 
 
0750 Pt WOB increased, mental status altered, pt lethargic. Pt placed on bipap 
 
0800 HR jumping 90s to 150s, AF RVR. Per Dr. Bernard Rogers, increase amio gtt to 1 mg/min. Cards updated 1130 Milrinone stopped, VBG obtained, pt started on dobutamine, see mar for titrations 1445 NGT placed 1700 BG checked with PICC line > 600 Fingerstick 89 Both ports (running meds compounded in D5) flushed with 20cc, reading 333, bmp sent to lab for confirmation 1815 Pt HR up to 160s, Dr. Marcia Allen informed, amio gtt increased again 1900 Patient son concerned with patient's agitation, asking if patient can have something for sleep. Will update intensivist.  
 
Only 125 cc of urine total for this 12 hr shift, 60 cc after dose of lasix 1930 Bedside shift change report given to Sara (oncoming nurse) by Reba Paulino (offgoing nurse). Report included the following information SBAR, Kardex, Procedure Summary, Intake/Output, MAR, Recent Results and Cardiac Rhythm NSR/ST/AFib.

## 2020-08-21 NOTE — PROGRESS NOTES
Occupational Therapy 08/21/20 Chart reviewed in preparation for therapy session. Noted recent events (worsening mental status, dyspnea, BIPAP, A-fib w/ possible cardio conversion). Discussed w/ PT who reports RN who advised to defer therapy today secondary to change in status. Therapy will follow up next week as able & appropriate for OT treatment. Recommend with nursing patient to complete as able in order to maintain strength, endurance and independence once Egress Test complete: OOB to chair 3x/day as able/ appropriate. Thank you for your assistance. Thank you, Bina Chavira, OTCAREY, OTR/L

## 2020-08-22 NOTE — PROGRESS NOTES
[facility] 
 
ICU TEAM Progress Note Name: Javier Sykes : 1935 MRN: [id number] Date: 2020 I Subjective:  
Progress Note: 2020 Reason for ICU Admission: Respiratory failure, status post cardiac arrest, encephalopathy, cardiogenic shock Interval history: 
82-year-old female with extensive medical history including diffuse coronary artery disease with systolic heart failure with ejection fraction of 25%, metastatic breast cancer, history of colon cancer and a plethora of other medical problem readmitted to the hospital on  after being discharged for less than 24-hour. Came in with increased work of breathing and some hypoxia with hypotension. Found to have worsening pleural effusion and pericardial effusion and sustained a brief cardiac arrest requiring a brief period of intubation on the evening of the . Now in the ICU profoundly debilitated receiving antibiotic diuretics and now on low-dose norepinephrine milrinone and was started on amiodarone drip as well. Usually on nasal cannula but requiring BiPAP at times. Despite multiple discussion with the family they changed her CODE STATUS and goals of care to full code. Overnight Events:  
Patient became more delirious, required BiPAP intermittently. Had to increase inotropes and Levophed doses. Received Lasix twice yesterday early morning and around 6 PM.  Despite that her urine output remained less than 400 cc an hour. She also received 1 dose of albumin. We were able to wean Levophed slowly overnight but continued to require minimal doses. No fever no nausea no vomiting. Did not require sedative overnight Active Problem List:  
 
Problem List  Date Reviewed: 2020 Codes Class Heart failure ([facility] ICD-10-CM: I50.9 ICD-9-CM: 428.9 CHF (congestive heart failure) (HCC) ICD-10-CM: I50.9 ICD-9-CM: 428.0 Bacteremia due to Gram-negative bacteria ICD-10-CM: R78.81 ICD-9-CM: 790.7, 041.85   
   
 UTI (urinary tract infection) ICD-10-CM: N39.0 ICD-9-CM: 599.0 Septic shock ([facility]) ICD-10-CM: A41.9, R65.21 ICD-9-CM: 038.9, 785.52, 995.92 Dizziness ICD-10-CM: Q18 ICD-9-CM: 780.4 Type 2 diabetes with nephropathy (HCC) ICD-10-CM: E11.21 
ICD-9-CM: 250.40, 583.81   
   
 CAD (coronary artery disease) ICD-10-CM: I25.10 ICD-9-CM: 414.00 [facility] ICD-10-CM: Y48.862 ICD-9-CM: 174.9 CKD (chronic kidney disease) stage 3, GFR 30-59 ml/min ([facility]) ICD-10-CM: N18.3 ICD-9-CM: 053. 3 Vitamin D deficiency ICD-10-CM: E55.9 ICD-9-CM: 268.9 Asymptomatic hyperuricemia ICD-10-CM: E79.0 ICD-9-CM: 790.6 Statin intolerance ICD-10-CM: Z78.9 ICD-9-CM: 995.27 Long-term use of immunosuppressant medication ICD-10-CM: Z79.899 ICD-9-CM: V58.69 Seropositive rheumatoid arthritis of multiple sites [facility]) ICD-10-CM: M05.79 ICD-9-CM: 714.0 Primary osteoarthritis of both knees ICD-10-CM: M17.0 ICD-9-CM: 715.16 Weakness due to cerebrovascular accident ICD-10-CM: [id number] ICD-9-CM: AUY3590 PAD (peripheral artery disease) ([facility]) ICD-10-CM: I73.9 ICD-9-CM: 443.9 Carotid bruit ICD-10-CM: R09.89 ICD-9-CM: 001. 9 Hyperlipidemia ICD-10-CM: E78.5 ICD-9-CM: 272.4 Type 2 diabetes mellitus with neurologic complication, with long-term current use of insulin ([facility]) ICD-10-CM: E11.49, Z79.4 ICD-9-CM: 250.60, V58.67 Colon cancer [facility]) ICD-10-CM: C18.9 ICD-9-CM: 153.9 Age-related osteoporosis without current pathological fracture ICD-10-CM: M81.0 ICD-9-CM: 733.01   
   
 HTN, goal below 140/90 ICD-10-CM: I10 
ICD-9-CM: 401.9 Anemia ICD-10-CM: D64.9 ICD-9-CM: 285.9 Past Medical History:  
 
 has a past medical history of Acute on chronic systolic HF [facility] (5/19/2018), Age-related osteoporosis without current pathological fracture (9/2/2009), Anemia (9/2/2009), Asymptomatic hyperuricemia (2/16/2017), Breast cancer ([facility] CAD (coronary artery disease) (6/21/2018), Carotid bruit (8/31/2011), CHF (congestive heart failure) ([facility] CKD (chronic kidney disease) stage 3, GFR 30-59 ml/min ([facility] (10/25/2017), Colon cancer ([facility] (9/2/2009), Colon cancer ([facility] (9/2/2009), COVID-19, DM (diabetes mellitus) ([facility] (9/2/2009), GERD (gastroesophageal reflux disease), H/O: CVA (9/2/2009), Heart attack ([facility] HTN, goal below 140/90 (9/2/2009), Hypothyroid (9/2/2009), Ill-defined condition, Long-term use of immunosuppressant medication (11/21/2016), Metastatic breast cancer ([facility] (6/1/2018), Microalbuminuria (9/2/2009), Osteoporosis (9/2/2009), Other and unspecified hyperlipidemia (1/27/2010), PAD (peripheral artery disease) ([facility] (9/7/2011), Primary osteoarthritis of both knees (9/28/2016), Rheumatoid arthritis involving ankle ([facility] (9/28/2016), Rheumatoid arthritis(714.0) (9/2/2009), Seropositive rheumatoid arthritis of multiple sites ([facility] (9/28/2016), Statin intolerance (12/21/2016), Type 2 diabetes mellitus with neurologic complication, with long-term current use of insulin ([facility] (9/2/2009), Type 2 diabetes with nephropathy ([facility] (8/20/2018), Unspecified essential hypertension (9/2/2009), Vitamin D deficiency (6/16/2017), and Weakness due to cerebrovascular accident. Past Surgical History:  
 
 has a past surgical history that includes hx colectomy; hx hysterectomy; and hx other surgical. 
 
Home Medications:  
 
Prior to Admission medications Medication Sig Start Date End Date Taking? Authorizing Provider  
furosemide (LASIX) 40 mg tablet Take 1 Tab by mouth two (2) times a day. 8/16/20  Yes Brigid Kirby DO  
lisinopriL (PRINIVIL, ZESTRIL) 5 mg tablet Take 1 Tab by mouth daily.  8/17/20  Yes Reid GUIDO, DO  
 carvediloL (Coreg) 6.25 mg tablet Take 6.25 mg by mouth two (2) times daily (with meals). Yes Provider, Historical  
clopidogreL (PLAVIX) 75 mg tab TAKE ONE TABLET BY MOUTH DAILY 6/2/20  Yes Leena Villarreal MD  
cholecalciferol (Vitamin D3) 25 mcg (1,000 unit) cap Take 1,000 Units by mouth daily. Yes Provider, Historical  
glucosam/wesley-msm1/C/bassam/bosw (OSTEO BI-FLEX TRIPLE STRENGTH PO) Take 2 Tabs by mouth daily. Yes Bebeto Aquino MD  
evolocumab (Repatha SureClick) pen injection 487 mg by SubCUTAneous route every fourteen (14) days. Yes Provider, Historical  
insulin aspart U-100 (NOVOLOG FLEXPEN U-100 INSULIN) 100 unit/mL (3 mL) inpn by SubCUTAneous route Before breakfast, lunch, dinner and at bedtime. 2-3 units depending on blood sugar before meals. (Note: son states very sensitive.)   Yes Provider, Historical  
multivitamin (ONE A DAY) tablet Take 1 Tab by mouth daily. Yes Provider, Historical  
insulin degludec (TRESIBA FLEXTOUCH U-100) 100 unit/mL (3 mL) inpn 14 Units by SubCUTAneous route daily. Yes Provider, Historical  
nitroglycerin (NITROSTAT) 0.4 mg SL tablet 1 Tab by SubLINGual route as needed for Chest Pain. Up to 3 doses. Patient taking differently: 1 Tab by SubLINGual route every five (5) minutes as needed for Chest Pain. Up to 3 doses. 6/6/18  Yes Vito Aparicio MD  
levothyroxine (SYNTHROID) 88 mcg tablet Take 88 mcg by mouth Daily (before breakfast). Yes Provider, Historical  
aspirin delayed-release 81 mg tablet Take 81 mg by mouth daily. Yes Provider, Historical  
OMEGA-3 FATTY ACIDS/FISH OIL (OMEGA 3 FISH OIL PO) Take  by mouth daily. Yes Provider, Historical  
 
 
Allergies/Social/Family History: Allergies Allergen Reactions  Statins-Hmg-Coa Reductase Inhibitors Other (comments) Intolerant to statins  Sulfa (Sulfonamide Antibiotics) Other (comments)  
  syncope Social History Tobacco Use  Smoking status: Never Smoker  Smokeless tobacco: Never Used Substance Use Topics  Alcohol use: No  
  
Family History Problem Relation Age of Onset  Diabetes Mother  Stroke Mother  Diabetes Sister  Other Sister   
     fell and hit her head -  of this  Diabetes Brother  Cancer Father   
     stomach  Diabetes Sister Review of Systems:  
 
Not able to obtain due to her medical condition Objective:  
Vital Signs: 
Visit Vitals /59 Pulse 99 Temp 98.8 °F (37.1 °C) Resp 21 Ht 5' 1\" (1.549 m) Wt 69.3 kg (152 lb 12.5 oz) SpO2 100% BMI 28.87 kg/m² O2 Flow Rate (L/min): 3 l/min O2 Device: Nasal cannula Temp (24hrs), Av.1 °F (36.7 °C), Min:97.3 °F (36.3 °C), Max:98.8 °F (37.1 °C) CVP (mmHg): 21 mmHg (20 0100) Intake/Output:  
 
Intake/Output Summary (Last 24 hours) at 2020 9708 Last data filed at 2020 0700 Gross per 24 hour Intake 1887.46 ml Output 390 ml Net 1497.46 ml Physical Exam: 
 
General:  Alert, confused, does not seem to be in distress Eyes:  Sclera anicteric. Pupils equally round and reactive to light. Mouth/Throat: Mucous membranes normal, oral pharynx clear Neck: Supple Lungs:    Good air entry bilaterally, fine crepitation bilaterally no wheezing CV:   Irregular rate and rhythm,no murmur, click, rub or gallop Abdomen:   Soft, non-tender. bowel sounds normal. non-distended Extremities: No cyanosis or edema Skin: Open wound under L breast (erythematous, ulcerated) Lymph nodes: Cervical and supraclavicular normal  
Musculoskeletal:  +1 edema Lines/Devices:  Intact, no erythema, drainage or tenderness Psych: Oriented to Person, otherwise confused, moves 4 limbs, generalized weakness and debility LABS AND  DATA: Personally reviewed Recent Labs  
  20 
0051 20 
0325 WBC 12.2* 12.7* HGB 8.0* 8.5* HCT 25.8* 27.6*  
 195 Recent Labs  
  20 
0051 20 
1715  20 0424  
* 129*   < > 136  
K 4.5 4.6   < > 4.7 CL 99 101   < > 107 CO2 20* 19*   < > 20* BUN 68* 68*   < > 63* CREA 2.33* 2.24*   < > 1.97* * 323*   < > 233* CA 7.5* 7.3*   < > 7.3*  
MG 2.2  --   --  2.1  
 < > = values in this interval not displayed. Recent Labs  
  08/21/20 
6778 ALB 2.1* No results for input(s): INR, PTP, APTT, INREXT in the last 72 hours. Recent Labs  
  08/21/20 
1137 08/21/20 
0414 PHI 7.25* 7.35  
PCO2I 36.4 28.8*  
PO2I 35* 106* FIO2I 0.30  -- No results for input(s): CPK, CKMB, TROIQ, BNPP in the last 72 hours. Hemodynamics:  
PAP:   CO:    
Wedge:   CI:    
CVP:  CVP (mmHg): 21 mmHg (08/22/20 0100) SVR:    
  PVR:    
 
Ventilator Settings: 
Mode Rate Tidal Volume Pressure FiO2 PEEP  
         30 % Peak airway pressure:     
Minute ventilation: 9.1 l/min MEDS: Reviewed Chest X-Ray: CXR Results  (Last 48 hours) 08/21/20 0759  XR CHEST PORT Final result Impression:  Impression: No interval change compared to the prior exam.  
   
  
 Narrative: Indication: Heart failure, increased work of breathing, follow-up abnormal chest  
x-ray Comparison to 8/20/2020. Portable exam obtained at 752 demonstrates little  
change in the bilateral pleural effusions and diffuse interstitial opacities  
compared to prior examination. 08/20/20 1000  XR CHEST PORT Final result Impression:  IMPRESSION:  
   
No pneumothorax. Satisfactory right PICC placement. Stable pleural effusions and  
diffuse interstitial opacities. Stable bony metastases. Narrative:  EXAM:  XR CHEST PORT INDICATION: Right PICC placement COMPARISON: 8/17/2020 TECHNIQUE: Portable AP upright chest view at 0948 hours FINDINGS: The right PICC terminates in profile with the SVC. The right IJ  
catheter has been removed. Cardiac monitoring wires overlie the thorax. The  
cardiomediastinal contours are stable. There are stable pleural effusions and diffuse interstitial opacities. There is  
no pneumothorax. Scattered bony metastases are again noted. ECHO: 
· LV: Estimated LVEF is 20 - 25% with severe global, anteroapical hypokinesis. · AV: Mild aortic valve stenosis is present. Aortic valve mean gradient is 12 mmHg. · MV: Mild to moderate mitral valve regurgitation is present. · Pericardium: Small circumferential pericardial effusion. No indications of tamponade present Assessment and Plan: 1. PEA Arrest (17 Aug 2020 - CPR - ROSC) 
a. CAD, diffuse coronary artery disease not amenable to intervention 
b. Cardiogenic shock 
c. Pericardial Effusion: Chronic without tamponade 
d. Acute on Chronic CHF, worsening ejection fraction now 20 to 25% 
i. Bilateral Pleural Effusions 
e. Anemia 2. Metastatic Breast Cancer (radiation treatment) a. Open Wound Under Left Breast (prior to admission) 3. Septic Shock (from UTI prior to admission) a. UTI (multiple Klebsiella UTIs in past) 4. DM2: Blood sugar not at goal, adjusting insulin doses 5. Colon Cancer 6. Acute kidney injury on top of chronic kidney disease stage III-IV 7. Encephalopathy [ICU delirium/toxic/metabolic]  Patient remains in cardiogenic shock, now on 5 dobutamine and low-dose norepinephrine. Minimal improvement with high-dose diuretics and albumin. Appreciate cardiology input  Remains on amiodarone drip  Renal function continues to deteriorate, remained oliguric. In my best judgment she is not a good candidate for hemodialysis, I discussed this with the son and they may have to consult nephrology. Day 6 of broad-spectrum antibiotic, 1 more day before completing 7 days empirical course.  We will continue to use BiPAP as needed  Delirium, likely giving her acute illness. Ammonia level normal.  No focal deficit.   We will continue efforts to reorient and will try to avoid sedative and pain medication as much as possible. Patient remains in critical condition. Her prognosis is guarded Remains full code DISPOSITION Stay in ICU CRITICAL CARE CONSULTANT NOTE I had a face to face encounter with the patient, reviewed and interpreted patient data including clinical events, labs, images, vital signs, I/O's, and examined patient. I have discussed the case and the plan and management of the patient's care with the consulting services, the bedside nurses and the respiratory therapist.   
 
NOTE OF PERSONAL INVOLVEMENT IN CARE This patient has a high probability of imminent, clinically significant deterioration, which requires the highest level of preparedness to intervene urgently. I participated in the decision-making and personally managed or directed the management of the following life and organ supporting interventions that required my frequent assessment to treat or prevent imminent deterioration. I personally spent 60 minutes of critical care time. This is time spent at this critically ill patient's bedside actively involved in patient care as well as the coordination of care and discussions with the patient's family. This does not include any procedural time which has been billed separately. Stephanie Corcoran M.D. Staff Intensivist/Pulmonologist 
[facility] 8/22/2020

## 2020-08-22 NOTE — PROGRESS NOTES
Brief critical care note: 
I was approached by the patient's son and power of  who asked me to change the CODE STATUS to DNR/DNI. He stated that he will reach conclusion and he is at peace that his mother will pass away soon. However he want me to continue other medical management including inotropes and pressors for the time being. He would like to ensure patient comfort and dignity at all times and this is his priority at this time.

## 2020-08-22 NOTE — PROGRESS NOTES
Bedside and Verbal shift change report given to Sara (oncoming nurse) by Dominick Pate (offgoing nurse). Report included the following information SBAR, Kardex, Intake/Output, MAR, Accordion, Recent Results, Cardiac Rhythm -NSR and Alarm Parameters . 2000 - Assumed care. Son at bedside, plan of care reviewed. Son voicing concerns over pt's agitation and/or pain, MD to speak w/ him. Refusing CHG bath at this time. 2200 - PRN Tylenol given per son's request. 
0000 - VSS, pt resting comfortably. Family requesting pt be allowed to rest as much as possible, no turns or suction. 0300 - AM labs drawn. 0400 - PRN Tylenol given. Bedside and Verbal shift change report given to Roxana Vasquez (oncoming nurse) by Nancy Monge (offgoing nurse). Report included the following information SBAR, Kardex, Intake/Output, MAR, Accordion, Recent Results, Cardiac Rhythm -NSR and Alarm Parameters .

## 2020-08-22 NOTE — PROGRESS NOTES
Bedside and Verbal shift change report given to Sara (oncoming nurse) by Chin Gomez (offgoing nurse). Report included the following information SBAR, Kardex, Intake/Output, MAR, Accordion, Recent Results, Cardiac Rhythm -ST and Alarm Parameters . 2000 - Assumed care, Son at bedside, plan of care reviewed. Son refused CHG bath for pt for the night. Son concerned about pt's agitation, requesting medication for sleep for pt. Son concerned about ammonia level, requesting testing. MD to speak with son. 2100 - MD at bedside to discuss pt's care and medications for the night with son. 2200 - MAP 80s, Levophed weaned. 0000 - AM labs and ammonia level drawn per son's request and MD order. CVP level checked per MD. 
0400 - MAPs WNL, continue to wean Levophed. Bedside and Verbal shift change report given to Chin Gomez (oncoming nurse) by Maynor Begum (offgoing nurse). Report included the following information SBAR, Kardex, Intake/Output, MAR, Accordion, Recent Results, Cardiac Rhythm - and Alarm Parameters .

## 2020-08-22 NOTE — PROGRESS NOTES
Cardiology Progress Note 8/22/2020 8:13 AM 
 
Admit Date: 8/16/2020 Admit Diagnosis: Heart failure ([facility] [I50.9] Subjective: Louise Higgins is seen for Dr Rosa Gray She is arousable by voice but weak and tired and sleepy I spoke to her CCU nurse Urine output is not good Palliative medicine is not currently involved per her but consult can be obtained Overnight atrial flutter RVR when amiodarone was reduced to 0.5 mcg/min dose so it had to go back up since her bp tends to be low and she is on dobutamine 5 mcg/kg/min She is now in NSR Past Medical History:  
Diagnosis Date  Acute on chronic systolic HF (heart failure) ([address] 5/19/2018  Age-related osteoporosis without current pathological fracture 9/2/2009  Anemia 9/2/2009  Asymptomatic hyperuricemia 2/16/2017  Breast cancer ([facility]  CAD (coronary artery disease) 6/21/2018  Carotid bruit 8/31/2011  CHF (congestive heart failure) ([facility]  CKD (chronic kidney disease) stage 3, GFR 30-59 ml/min ([facility]) 10/25/2017  Colon cancer ([facility] 9/2/2009  
 surgery/chemo  Colon cancer ([facility] 9/2/2009  COVID-19   
 DM (diabetes mellitus) ([facility] 9/2/2009  GERD (gastroesophageal reflux disease)  H/O: CVA 9/2/2009  
 slight l sided weakness  Heart attack ([address]  HTN, goal below 140/90 9/2/2009  Hypothyroid 9/2/2009  Ill-defined condition   
 seasonal allergies  Long-term use of immunosuppressant medication 11/21/2016  Metastatic breast cancer ([address] 6/1/2018  Microalbuminuria 9/2/2009  Osteoporosis 9/2/2009  Other and unspecified hyperlipidemia 1/27/2010  PAD (peripheral artery disease) ([address] 9/7/2011  Primary osteoarthritis of both knees 9/28/2016  Rheumatoid arthritis involving ankle ([address] 9/28/2016  Rheumatoid arthritis(714.0) 9/2/2009  Seropositive rheumatoid arthritis of multiple sites ([address] 9/28/2016  Statin intolerance 12/21/2016  Type 2 diabetes mellitus with neurologic complication, with long-term current use of insulin ([facility] 9/2/2009  Type 2 diabetes with nephropathy [facility] 8/20/2018  Unspecified essential hypertension 9/2/2009  Vitamin D deficiency 6/16/2017  Weakness due to cerebrovascular accident Past Surgical History:  
Procedure Laterality Date  HX COLECTOMY  HX HYSTERECTOMY  HX OTHER SURGICAL    
 colonoscopies numerous since 1993 Social History Socioeconomic History  Marital status:  Spouse name: Not on file  Number of children: Not on file  Years of education: Not on file  Highest education level: Not on file Occupational History  Not on file Social Needs  Financial resource strain: Not on file  Food insecurity Worry: Not on file Inability: Not on file  Transportation needs Medical: Not on file Non-medical: Not on file Tobacco Use  Smoking status: Never Smoker  Smokeless tobacco: Never Used Substance and Sexual Activity  Alcohol use: No  
 Drug use: No  
 Sexual activity: Not Currently Partners: Male Lifestyle  Physical activity Days per week: Not on file Minutes per session: Not on file  Stress: Not on file Relationships  Social connections Talks on phone: Not on file Gets together: Not on file Attends Protestant service: Not on file Active member of club or organization: Not on file Attends meetings of clubs or organizations: Not on file Relationship status: Not on file  Intimate partner violence Fear of current or ex partner: Not on file Emotionally abused: Not on file Physically abused: Not on file Forced sexual activity: Not on file Other Topics Concern  Not on file Social History Narrative  Not on file Current Facility-Administered Medications Medication Dose Route Frequency  insulin glargine (LANTUS) injection 13 Units  13 Units SubCUTAneous DAILY  NOREPINephrine (LEVOPHED) 8 mg in 5% dextrose 250mL (32 mcg/mL) infusion  0.5-30 mcg/min IntraVENous TITRATE  [START ON 8/27/2020] levothyroxine (SYNTHROID) injection 66 mcg  66 mcg IntraVENous Q24H  
 DOBUTamine (DOBUTREX) 500 mg/250 mL (2,000 mcg/mL) infusion  0-10 mcg/kg/min IntraVENous TITRATE  melatonin tablet 3 mg  3 mg Oral QHS  midodrine (PROAMATINE) tablet 10 mg  10 mg Oral TID WITH MEALS  
 amiodarone (CORDARONE) 375 mg/250 mL D5W infusion  1 mg/min IntraVENous TITRATE  alteplase (CATHFLO) 1 mg in sterile water (preservative free) 1 mL injection  1 mg InterCATHeter PRN  
 cefepime (MAXIPIME) 2 g in 0.9% sodium chloride (MBP/ADV) 100 mL  2 g IntraVENous Q24H  prochlorperazine (COMPAZINE) with saline injection 10 mg  10 mg IntraVENous Q6H PRN  
 famotidine (PEPCID) tablet 20 mg  20 mg Oral DAILY  sodium chloride (NS) flush 5-40 mL  5-40 mL IntraVENous Q8H  
 sodium chloride (NS) flush 5-40 mL  5-40 mL IntraVENous PRN  
 acetaminophen (TYLENOL) tablet 650 mg  650 mg Oral Q6H PRN Or  
 acetaminophen (TYLENOL) suppository 650 mg  650 mg Rectal Q6H PRN  polyethylene glycol (MIRALAX) packet 17 g  17 g Oral DAILY PRN  
 ondansetron (ZOFRAN) injection 4 mg  4 mg IntraVENous Q6H PRN  
 glucose chewable tablet 16 g  4 Tab Oral PRN  
 glucagon (GLUCAGEN) injection 1 mg  1 mg IntraMUSCular PRN  
 dextrose 10% infusion 0-250 mL  0-250 mL IntraVENous PRN  
 insulin lispro (HUMALOG) injection   SubCUTAneous AC&HS  aspirin delayed-release tablet 81 mg  81 mg Oral DAILY  clopidogreL (PLAVIX) tablet 75 mg  75 mg Oral DAILY  [Held by provider] carvediloL (COREG) tablet 6.25 mg  6.25 mg Oral BID  [Held by provider] lisinopriL (PRINIVIL, ZESTRIL) tablet 5 mg  5 mg Oral DAILY Objective:  
  
Physical Exam: 
Visit Vitals /59 Pulse 99 Temp 98.8 °F (37.1 °C) Resp 21  
 Ht 5' 1\" (1.549 m) Wt 152 lb 12.5 oz (69.3 kg) SpO2 100% BMI 28.87 kg/m² General Appearance:  Well developed, and individual in no acute distress. Ears/Nose/Mouth/Throat:   Hearing grossly normal. 
  
    Neck: Supple. Chest:   Lungs clear to auscultation bilaterally. Cardiovascular:  Regular rhythm, 2/6 systolic LSB murmur. Abdomen:   Soft Extremities: trace edema bilaterally. Skin: Warm and dry. Data Review:  
Labs:   
Recent Results (from the past 24 hour(s)) EKG, 12 LEAD, INITIAL Collection Time: 08/21/20  8:32 AM  
Result Value Ref Range Ventricular Rate 154 BPM  
 Atrial Rate 159 BPM  
 QRS Duration 130 ms  
 Q-T Interval 336 ms  
 QTC Calculation (Bezet) 538 ms Calculated R Axis 27 degrees Calculated T Axis 141 degrees Diagnosis Supraventricular tachycardia Left bundle branch block When compared with ECG of 21-AUG-2020 07:28, 
Supraventricular tachycardia has replaced Sinus rhythm Left bundle branch block now present Confirmed by Gail Soto M.D., Betty [place] ([id number]) on 8/21/2020 4:35:38 PM 
  
GLUCOSE, POC Collection Time: 08/21/20  8:46 AM  
Result Value Ref Range Glucose (POC) 347 (H) 65 - 100 mg/dL Performed by [employer] Console GLUCOSE, POC Collection Time: 08/21/20 11:35 AM  
Result Value Ref Range Glucose (POC) 392 (H) 65 - 100 mg/dL Performed by [employer] POC EG7 Collection Time: 08/21/20 11:37 AM  
Result Value Ref Range Calcium, ionized (POC) 1.12 1.12 - 1.32 mmol/L  
 FIO2 (POC) 0.30 % pH (POC) 7.25 (L) 7.35 - 7.45    
 pCO2 (POC) 36.4 35.0 - 45.0 MMHG  
 pO2 (POC) 35 (LL) 80 - 100 MMHG  
 HCO3 (POC) 16.1 (L) 22 - 26 MMOL/L Base deficit (POC) 11 mmol/L  
 sO2 (POC) 58 (L) 92 - 97 % Site OTHER Device: BIPAP    
 PEEP/CPAP (POC) 5 cmH2O Allens test (POC) NO Specimen type (POC) VENOUS BLOOD Total resp. rate 24 GLUCOSE, POC  Collection Time: 08/21/20  4:54 PM  
 Result Value Ref Range Glucose (POC) >600 (HH) 65 - 100 mg/dL Performed by Jose Lanier GLUCOSE, POC Collection Time: 08/21/20  4:56 PM  
Result Value Ref Range Glucose (POC) 89 65 - 100 mg/dL Performed by Jose Lanier GLUCOSE, POC Collection Time: 08/21/20  5:05 PM  
Result Value Ref Range Glucose (POC) 333 (H) 65 - 100 mg/dL Performed by Jose Yannick METABOLIC PANEL, BASIC Collection Time: 08/21/20  5:15 PM  
Result Value Ref Range Sodium 129 (L) 136 - 145 mmol/L Potassium 4.6 3.5 - 5.1 mmol/L Chloride 101 97 - 108 mmol/L  
 CO2 19 (L) 21 - 32 mmol/L Anion gap 9 5 - 15 mmol/L Glucose 323 (H) 65 - 100 mg/dL BUN 68 (H) 6 - 20 MG/DL Creatinine 2.24 (H) 0.55 - 1.02 MG/DL  
 BUN/Creatinine ratio 30 (H) 12 - 20 GFR est AA 25 (L) >60 ml/min/1.73m2 GFR est non-AA 21 (L) >60 ml/min/1.73m2 Calcium 7.3 (L) 8.5 - 10.1 MG/DL  
GLUCOSE, POC Collection Time: 08/21/20  9:59 PM  
Result Value Ref Range Glucose (POC) 333 (H) 65 - 100 mg/dL Performed by Bert Vallejo AMMONIA Collection Time: 08/22/20 12:51 AM  
Result Value Ref Range Ammonia 34 (H) <32 UMOL/L  
CBC WITH AUTOMATED DIFF Collection Time: 08/22/20 12:51 AM  
Result Value Ref Range WBC 12.2 (H) 3.6 - 11.0 K/uL  
 RBC 3.04 (L) 3.80 - 5.20 M/uL HGB 8.0 (L) 11.5 - 16.0 g/dL HCT 25.8 (L) 35.0 - 47.0 % MCV 84.9 80.0 - 99.0 FL  
 MCH 26.3 26.0 - 34.0 PG  
 MCHC 31.0 30.0 - 36.5 g/dL RDW 20.4 (H) 11.5 - 14.5 % PLATELET 505 437 - 925 K/uL MPV 10.3 8.9 - 12.9 FL  
 NRBC 0.4 (H) 0  WBC ABSOLUTE NRBC 0.05 (H) 0.00 - 0.01 K/uL NEUTROPHILS 85 (H) 32 - 75 % LYMPHOCYTES 2 (L) 12 - 49 % MONOCYTES 11 5 - 13 % EOSINOPHILS 0 0 - 7 % BASOPHILS 0 0 - 1 % IMMATURE GRANULOCYTES 2 (H) 0.0 - 0.5 % ABS. NEUTROPHILS 10.5 (H) 1.8 - 8.0 K/UL  
 ABS. LYMPHOCYTES 0.2 (L) 0.8 - 3.5 K/UL  
 ABS. MONOCYTES 1.3 (H) 0.0 - 1.0 K/UL ABS. EOSINOPHILS 0.0 0.0 - 0.4 K/UL  
 ABS. BASOPHILS 0.0 0.0 - 0.1 K/UL  
 ABS. IMM. GRANS. 0.2 (H) 0.00 - 0.04 K/UL  
 DF SMEAR SCANNED    
 PLATELET COMMENTS Large Platelets RBC COMMENTS ANISOCYTOSIS 2+ 
    
 RBC COMMENTS OVALOCYTES PRESENT 
    
 RBC COMMENTS POLYCHROMASIA PRESENT 
    
 RBC COMMENTS MACHO CELLS 
PRESENT 
    
LACTIC ACID Collection Time: 08/22/20 12:51 AM  
Result Value Ref Range Lactic acid 1.4 0.4 - 2.0 MMOL/L  
METABOLIC PANEL, BASIC Collection Time: 08/22/20 12:51 AM  
Result Value Ref Range Sodium 129 (L) 136 - 145 mmol/L Potassium 4.5 3.5 - 5.1 mmol/L Chloride 99 97 - 108 mmol/L  
 CO2 20 (L) 21 - 32 mmol/L Anion gap 10 5 - 15 mmol/L Glucose 310 (H) 65 - 100 mg/dL BUN 68 (H) 6 - 20 MG/DL Creatinine 2.33 (H) 0.55 - 1.02 MG/DL  
 BUN/Creatinine ratio 29 (H) 12 - 20 GFR est AA 24 (L) >60 ml/min/1.73m2 GFR est non-AA 20 (L) >60 ml/min/1.73m2 Calcium 7.5 (L) 8.5 - 10.1 MG/DL  
NT-PRO BNP Collection Time: 08/22/20 12:51 AM  
Result Value Ref Range NT pro-BNP >35,000 (H) <450 PG/ML  
PROCALCITONIN Collection Time: 08/22/20 12:51 AM  
Result Value Ref Range Procalcitonin 3.07 ng/mL MAGNESIUM Collection Time: 08/22/20 12:51 AM  
Result Value Ref Range Magnesium 2.2 1.6 - 2.4 mg/dL GLUCOSE, POC Collection Time: 08/22/20  8:01 AM  
Result Value Ref Range Glucose (POC) 330 (H) 65 - 100 mg/dL Performed by Marysue Pool Telemetry: normal sinus rhythm Assessment/Plan 1. Paroxysmal AFIB/atrial flutter RVR but NSR on IV amiodarone 1 mg/min. 2. Hypotension/cardiogenic-septic shock  
            -was on levophed and now dobutamine Off coreg 
cefepime 3. Acute on chronic systolic CHF: EF 86-67% 5/59/17, (was 25% (8/14/20) -NYHA class IV 
             dobutamine 
              
4. CAD/Elevated troponin due to NSTEMI and d/t increased myocardial demand. Per Dr Venancio Skelton: cardiac catheterization from 2018. Diffuse multivessel disease. Unlikely to get durable outcome from CABG or PCI. While proximal LAD PCI could be considered as a bailout, given current multisystem failure it would not be preferred particularly given oliguric renal failure 
  
5. JEROD on CKD: Hold ACE inhibitor's. Urine output is declining Low BP and cardiac output despite dobutamine Nephrology consult is needed but ? Dialysis candidate if needed later 6. Chronic anemia  
 
7 Moderate aortic stenosis/ Mild-mod MR   
low gradient AS due to CHF           
  
8. PFO/ASD: Left to right shunt. dobutamine instead of milrinone 
   
9. Prognosis: poor. Palliative medicine needs to be reinvolved with family CCU nurse will try to reconsult Gisela Melgar M.D. [facility] Electrophysiology/Cardiology [facility] Eris Fitzpatrick, Adia Urias [address]                             
[phone]

## 2020-08-22 NOTE — PROGRESS NOTES
0730 Bedside shift change report given to Terra (oncoming nurse) by Pebbles Adames (offgoing nurse). Report included the following information SBAR, Kardex, Procedure Summary, Intake/Output, MAR, Recent Results and Cardiac Rhythm NSR.  
 
1145 Robin son states that he would like to focus on comfort for mother, not quite ready to withdraw care 1800 Pt turned to DNR/DNI 
 
1930 Bedside shift change report given to Sara (oncoming nurse) by Terra (offgoing nurse). Report included the following information MAR, Recent Results and Cardiac Rhythm NSR.

## 2020-08-22 NOTE — INTERDISCIPLINARY ROUNDS
Multidisciplinary rounds were held August 22, 2020. Today's plan/goal includes (but not limited to): wean pressors, stable HR/rhythm, palliative

## 2020-08-22 NOTE — PROGRESS NOTES
Problem: Diabetes Self-Management Goal: *Disease process and treatment process Description: Define diabetes and identify own type of diabetes; list 3 options for treating diabetes. Outcome: Progressing Towards Goal 
Goal: *Incorporating nutritional management into lifestyle Description: Describe effect of type, amount and timing of food on blood glucose; list 3 methods for planning meals. Outcome: Progressing Towards Goal 
Goal: *Incorporating physical activity into lifestyle Description: State effect of exercise on blood glucose levels. Outcome: Progressing Towards Goal 
Goal: *Developing strategies to promote health/change behavior Description: Define the ABC's of diabetes; identify appropriate screenings, schedule and personal plan for screenings. Outcome: Progressing Towards Goal 
Goal: *Using medications safely Description: State effect of diabetes medications on diabetes; name diabetes medication taking, action and side effects. Outcome: Progressing Towards Goal 
Goal: *Monitoring blood glucose, interpreting and using results Description: Identify recommended blood glucose targets  and personal targets. Outcome: Progressing Towards Goal 
Goal: *Prevention, detection, treatment of acute complications Description: List symptoms of hyper- and hypoglycemia; describe how to treat low blood sugar and actions for lowering  high blood glucose level. Outcome: Progressing Towards Goal 
Goal: *Prevention, detection and treatment of chronic complications Description: Define the natural course of diabetes and describe the relationship of blood glucose levels to long term complications of diabetes. Outcome: Progressing Towards Goal 
Goal: *Developing strategies to address psychosocial issues Description: Describe feelings about living with diabetes; identify support needed and support network Outcome: Progressing Towards Goal 
Goal: *Insulin pump training Outcome: Progressing Towards Goal 
 Goal: *Sick day guidelines Outcome: Progressing Towards Goal 
Goal: *Patient Specific Goal (EDIT GOAL, INSERT TEXT) Outcome: Progressing Towards Goal 
  
Problem: Patient Education: Go to Patient Education Activity Goal: Patient/Family Education Outcome: Progressing Towards Goal 
  
Problem: Risk for Spread of Infection Goal: Prevent transmission of infectious organism to others Description: Prevent the transmission of infectious organisms to other patients, staff members, and visitors. Outcome: Progressing Towards Goal 
  
Problem: Patient Education:  Go to Education Activity Goal: Patient/Family Education Outcome: Progressing Towards Goal 
  
Problem: Falls - Risk of 
Goal: *Absence of Falls Description: Document Ayla Hughes Fall Risk and appropriate interventions in the flowsheet. Outcome: Progressing Towards Goal 
Note: Fall Risk Interventions: 
Mobility Interventions: Communicate number of staff needed for ambulation/transfer, Patient to call before getting OOB Mentation Interventions: Adequate sleep, hydration, pain control, Door open when patient unattended, Evaluate medications/consider consulting pharmacy, Family/sitter at bedside, More frequent rounding, Reorient patient, Room close to nurse's station, Toileting rounds Medication Interventions: Evaluate medications/consider consulting pharmacy Elimination Interventions: Call light in reach, Toileting schedule/hourly rounds, Patient to call for help with toileting needs History of Falls Interventions: Consult care management for discharge planning, Door open when patient unattended, Evaluate medications/consider consulting pharmacy, Investigate reason for fall, Room close to nurse's station Problem: Patient Education: Go to Patient Education Activity Goal: Patient/Family Education Outcome: Progressing Towards Goal 
  
Problem: Pressure Injury - Risk of 
Goal: *Prevention of pressure injury Description: Document Calderon Scale and appropriate interventions in the flowsheet. Outcome: Progressing Towards Goal 
Note: Pressure Injury Interventions: 
Sensory Interventions: Assess changes in LOC, Assess need for specialty bed, Avoid rigorous massage over bony prominences, Check visual cues for pain, Float heels, Keep linens dry and wrinkle-free, Maintain/enhance activity level, Minimize linen layers, Monitor skin under medical devices, Pad between skin to skin, Pressure redistribution bed/mattress (bed type), Turn and reposition approx. every two hours (pillows and wedges if needed) Moisture Interventions: Absorbent underpads, Apply protective barrier, creams and emollients, Check for incontinence Q2 hours and as needed, Internal/External urinary devices, Maintain skin hydration (lotion/cream), Minimize layers, Moisture barrier Activity Interventions: Pressure redistribution bed/mattress(bed type), Increase time out of bed Mobility Interventions: Assess need for specialty bed, Float heels, HOB 30 degrees or less, Pressure redistribution bed/mattress (bed type), Turn and reposition approx. every two hours(pillow and wedges) Nutrition Interventions: Document food/fluid/supplement intake, Discuss nutritional consult with provider Friction and Shear Interventions: Apply protective barrier, creams and emollients, HOB 30 degrees or less, Lift sheet, Minimize layers, Transferring/repositioning devices Problem: Patient Education: Go to Patient Education Activity Goal: Patient/Family Education Outcome: Progressing Towards Goal 
  
Problem: Pain Goal: *Control of Pain Outcome: Progressing Towards Goal 
Goal: *PALLIATIVE CARE:  Alleviation of Pain Outcome: Progressing Towards Goal 
  
Problem: Patient Education: Go to Patient Education Activity Goal: Patient/Family Education Outcome: Progressing Towards Goal 
  
Problem: Patient Education: Go to Patient Education Activity Goal: Patient/Family Education Outcome: Progressing Towards Goal 
  
Problem: AMI: Day 3 Goal: Off Pathway (Use only if patient is Off Pathway) Outcome: Progressing Towards Goal 
Goal: Activity/Safety Outcome: Progressing Towards Goal 
Goal: Consults, if ordered Outcome: Progressing Towards Goal 
Goal: Diagnostic Test/Procedures Outcome: Progressing Towards Goal 
Goal: Nutrition/Diet Outcome: Progressing Towards Goal 
Goal: Discharge Planning Outcome: Progressing Towards Goal 
Goal: Medications Outcome: Progressing Towards Goal 
Goal: Respiratory Outcome: Progressing Towards Goal 
Goal: Treatments/Interventions/Procedures Outcome: Progressing Towards Goal 
Goal: Psychosocial 
Outcome: Progressing Towards Goal 
Goal: *Optimal pain control at patient's stated goal 
Outcome: Progressing Towards Goal 
Goal: *Hemodynamically stable Outcome: Progressing Towards Goal 
Goal: *Demonstrates progressive activity Outcome: Progressing Towards Goal 
Goal: *Tolerating diet Outcome: Progressing Towards Goal 
  
Problem: AMI: Day 4 Goal: Off Pathway (Use only if patient is Off Pathway) Outcome: Progressing Towards Goal 
Goal: Activity/Safety Outcome: Progressing Towards Goal 
Goal: Diagnostic Test/Procedures Outcome: Progressing Towards Goal 
Goal: Nutrition/Diet Outcome: Progressing Towards Goal 
Goal: Discharge Planning Outcome: Progressing Towards Goal 
Goal: Medications Outcome: Progressing Towards Goal 
Goal: Respiratory Outcome: Progressing Towards Goal 
Goal: Treatments/Interventions/Procedures Outcome: Progressing Towards Goal 
Goal: Psychosocial 
Outcome: Progressing Towards Goal 
Goal: *Optimal pain control at patient's stated goal 
Outcome: Progressing Towards Goal 
Goal: *Hemodynamically stable Outcome: Progressing Towards Goal 
Goal: *Demonstrates progressive activity Outcome: Progressing Towards Goal 
Goal: *Tolerating diet Outcome: Progressing Towards Goal 
  
Problem: AMI: Day 5 
 Goal: Off Pathway (Use only if patient is Off Pathway) Outcome: Progressing Towards Goal 
Goal: Activity/Safety Outcome: Progressing Towards Goal 
Goal: Diagnostic Test/Procedures Outcome: Progressing Towards Goal 
Goal: Nutrition/Diet Outcome: Progressing Towards Goal 
Goal: Discharge Planning Outcome: Progressing Towards Goal 
Goal: Medications Outcome: Progressing Towards Goal 
Goal: Treatments/Interventions/Procedures Outcome: Progressing Towards Goal 
Goal: Psychosocial 
Outcome: Progressing Towards Goal 
  
Problem: AMI: Discharge Outcomes Goal: *Demonstrates ability to perform prescribed activity without shortness of breath or discomfort Outcome: Progressing Towards Goal 
Goal: *Verbalizes understanding and describes prescribed diet Outcome: Progressing Towards Goal 
Goal: *Describes follow-up/return visits to physicians Outcome: Progressing Towards Goal 
Goal: *Describes home care/support arrangements established based on need Outcome: Progressing Towards Goal 
Goal: *Anxiety reduced or absent Outcome: Progressing Towards Goal 
Goal: *Understands and describes signs and symptoms to report to providers(Stroke Metric) Outcome: Progressing Towards Goal 
Goal: *Verbalizes name, dosage, time, side effects, and number of days to continue medications Outcome: Progressing Towards Goal 
  
Problem: Patient Education: Go to Patient Education Activity Goal: Patient/Family Education Outcome: Progressing Towards Goal 
  
Problem: Heart Failure: Day 3 Goal: Off Pathway (Use only if patient is Off Pathway) Outcome: Progressing Towards Goal 
Goal: Activity/Safety Outcome: Progressing Towards Goal 
Goal: Diagnostic Test/Procedures Outcome: Progressing Towards Goal 
Goal: Nutrition/Diet Outcome: Progressing Towards Goal 
Goal: Discharge Planning Outcome: Progressing Towards Goal 
Goal: Medications Outcome: Progressing Towards Goal 
Goal: Respiratory Outcome: Progressing Towards Goal 
 Goal: Treatments/Interventions/Procedures Outcome: Progressing Towards Goal 
Goal: Psychosocial 
Outcome: Progressing Towards Goal 
Goal: *Oxygen saturation within defined limits Outcome: Progressing Towards Goal 
Goal: *Hemodynamically stable Outcome: Progressing Towards Goal 
Goal: *Optimal pain control at patient's stated goal 
Outcome: Progressing Towards Goal 
Goal: *Anxiety reduced or absent Outcome: Progressing Towards Goal 
Goal: *Demonstrates progressive activity Outcome: Progressing Towards Goal 
  
Problem: Heart Failure: Day 4 Goal: Off Pathway (Use only if patient is Off Pathway) Outcome: Progressing Towards Goal 
Goal: Activity/Safety Outcome: Progressing Towards Goal 
Goal: Diagnostic Test/Procedures Outcome: Progressing Towards Goal 
Goal: Nutrition/Diet Outcome: Progressing Towards Goal 
Goal: Discharge Planning Outcome: Progressing Towards Goal 
Goal: Medications Outcome: Progressing Towards Goal 
Goal: Respiratory Outcome: Progressing Towards Goal 
Goal: Treatments/Interventions/Procedures Outcome: Progressing Towards Goal 
Goal: Psychosocial 
Outcome: Progressing Towards Goal 
Goal: *Oxygen saturation within defined limits Outcome: Progressing Towards Goal 
Goal: *Hemodynamically stable Outcome: Progressing Towards Goal 
Goal: *Optimal pain control at patient's stated goal 
Outcome: Progressing Towards Goal 
Goal: *Anxiety reduced or absent Outcome: Progressing Towards Goal 
Goal: *Demonstrates progressive activity Outcome: Progressing Towards Goal 
  
Problem: Heart Failure: Day 5 Goal: Off Pathway (Use only if patient is Off Pathway) Outcome: Progressing Towards Goal 
Goal: Activity/Safety Outcome: Progressing Towards Goal 
Goal: Diagnostic Test/Procedures Outcome: Progressing Towards Goal 
Goal: Nutrition/Diet Outcome: Progressing Towards Goal 
Goal: Discharge Planning Outcome: Progressing Towards Goal 
Goal: Medications Outcome: Progressing Towards Goal 
 Goal: Respiratory Outcome: Progressing Towards Goal 
Goal: Treatments/Interventions/Procedures Outcome: Progressing Towards Goal 
Goal: Psychosocial 
Outcome: Progressing Towards Goal 
  
Problem: Heart Failure: Discharge Outcomes Goal: *Demonstrates ability to perform prescribed activity without shortness of breath or discomfort Outcome: Progressing Towards Goal 
Goal: *Left ventricular function assessment completed prior to or during stay, or planned for post-discharge Outcome: Progressing Towards Goal 
Goal: *ACEI prescribed if LVEF less than 40% and no contraindications or ARB prescribed Outcome: Progressing Towards Goal 
Goal: *Verbalizes understanding and describes prescribed diet Outcome: Progressing Towards Goal 
Goal: *Verbalizes understanding/describes prescribed medications Outcome: Progressing Towards Goal 
Goal: *Describes available resources and support systems Description: (eg: Home Health, Palliative Care, [employer]) Outcome: Progressing Towards Goal 
Goal: *Describes smoking cessation resources Outcome: Progressing Towards Goal 
Goal: *Understands and describes signs and symptoms to report to providers(Stroke Metric) Outcome: Progressing Towards Goal 
Goal: *Describes/verbalizes understanding of follow-up/return appt Description: (eg: to physicians, diabetes treatment coordinator, and other resources Outcome: Progressing Towards Goal 
Goal: *Describes importance of continuing daily weights and changes to report to physician Outcome: Progressing Towards Goal 
  
Problem: Infection - Risk of, Central Venous Catheter-Associated Bloodstream Infection Goal: *Absence of infection signs and symptoms Outcome: Progressing Towards Goal 
  
Problem: Patient Education: Go to Patient Education Activity Goal: Patient/Family Education Outcome: Progressing Towards Goal 
  
Problem: Infection - Risk of, Urinary Catheter-Associated Urinary Tract Infection Goal: *Absence of infection signs and symptoms Outcome: Progressing Towards Goal 
  
Problem: Patient Education: Go to Patient Education Activity Goal: Patient/Family Education Outcome: Progressing Towards Goal 
  
Problem: Hypotension Goal: *Blood pressure within specified parameters Outcome: Progressing Towards Goal 
Goal: *Fluid volume balance Outcome: Progressing Towards Goal 
Goal: *Labs within defined limits Outcome: Progressing Towards Goal 
  
Problem: Patient Education: Go to Patient Education Activity Goal: Patient/Family Education Outcome: Progressing Towards Goal 
  
Problem: Pressure Injury - Risk of 
Goal: *Prevention of pressure injury Description: Document Calderon Scale and appropriate interventions in the flowsheet. Outcome: Progressing Towards Goal 
Note: Pressure Injury Interventions: 
Sensory Interventions: Assess changes in LOC, Assess need for specialty bed, Avoid rigorous massage over bony prominences, Check visual cues for pain, Float heels, Keep linens dry and wrinkle-free, Maintain/enhance activity level, Minimize linen layers, Monitor skin under medical devices, Pad between skin to skin, Pressure redistribution bed/mattress (bed type), Turn and reposition approx. every two hours (pillows and wedges if needed) Moisture Interventions: Absorbent underpads, Apply protective barrier, creams and emollients, Check for incontinence Q2 hours and as needed, Internal/External urinary devices, Maintain skin hydration (lotion/cream), Minimize layers, Moisture barrier Activity Interventions: Pressure redistribution bed/mattress(bed type), Increase time out of bed Mobility Interventions: Assess need for specialty bed, Float heels, HOB 30 degrees or less, Pressure redistribution bed/mattress (bed type), Turn and reposition approx. every two hours(pillow and wedges) Nutrition Interventions: Document food/fluid/supplement intake, Discuss nutritional consult with provider Friction and Shear Interventions: Apply protective barrier, creams and emollients, HOB 30 degrees or less, Lift sheet, Minimize layers, Transferring/repositioning devices Problem: Patient Education: Go to Patient Education Activity Goal: Patient/Family Education Outcome: Progressing Towards Goal 
  
Problem: Chronic Renal Failure Goal: *Fluid and electrolytes stabilized Outcome: Progressing Towards Goal 
  
Problem: Patient Education: Go to Patient Education Activity Goal: Patient/Family Education Outcome: Progressing Towards Goal 
  
Problem: Patient Education: Go to Patient Education Activity Goal: Patient/Family Education Outcome: Progressing Towards Goal 
  
Problem: Patient Education: Go to Patient Education Activity Goal: Patient/Family Education Outcome: Progressing Towards Goal 
  
Problem: Non-Violent Restraints Goal: *Removal from restraints as soon as assessed to be safe Outcome: Progressing Towards Goal 
Goal: *No harm/injury to patient while restraints in use Outcome: Progressing Towards Goal 
Goal: *Patient's dignity will be maintained Outcome: Progressing Towards Goal 
Goal: *Patient Specific Goal (EDIT GOAL, INSERT TEXT) Outcome: Progressing Towards Goal 
Goal: Non-violent Restaints:Standard Interventions Outcome: Progressing Towards Goal 
Goal: Non-violent Restraints:Patient Interventions Outcome: Progressing Towards Goal 
Goal: Patient/Family Education Outcome: Progressing Towards Goal

## 2020-08-23 NOTE — PROGRESS NOTES
Visited in response to notification that pt has been transitioned to comfort care. Pt is known to  from previous hospital stay. At bedside were her three sons. Brief visit as the three sons simply want to spend the time alone with their mother. Assured them of ongoing  support. Sheryle Harold, Chaplain, MDiv, [place], [facility] 
[phone]

## 2020-08-23 NOTE — PROGRESS NOTES
Cardiology Progress Note 8/23/2020 8:13 AM 
 
Admit Date: 8/16/2020 Admit Diagnosis: Heart failure ([facility] [I50.9] Subjective: Abbe Willis is seen for Dr Maggie Ramirez She is weak and tired, sleepy I spoke to her CCU nurse She is less alert now BP is lower and Norepinephrine added dobutamine by CCU intensivist 
She has been back to NSR with 1 mg/min amiodarone IV No more VT or AFIB Past Medical History:  
Diagnosis Date  Acute on chronic systolic HF (heart failure) ([facility] 5/19/2018  Age-related osteoporosis without current pathological fracture 9/2/2009  Anemia 9/2/2009  Asymptomatic hyperuricemia 2/16/2017  Breast cancer ([facility]  CAD (coronary artery disease) 6/21/2018  Carotid bruit 8/31/2011  CHF (congestive heart failure) ([facility]  CKD (chronic kidney disease) stage 3, GFR 30-59 ml/min ([facility]) 10/25/2017  Colon cancer ([facility] 9/2/2009  
 surgery/chemo  Colon cancer ([facility] 9/2/2009  COVID-19   
 DM (diabetes mellitus) ([facility] 9/2/2009  GERD (gastroesophageal reflux disease)  H/O: CVA 9/2/2009  
 slight l sided weakness  Heart attack ([address]  HTN, goal below 140/90 9/2/2009  Hypothyroid 9/2/2009  Ill-defined condition   
 seasonal allergies  Long-term use of immunosuppressant medication 11/21/2016  Metastatic breast cancer ([facility] 6/1/2018  Microalbuminuria 9/2/2009  Osteoporosis 9/2/2009  Other and unspecified hyperlipidemia 1/27/2010  PAD (peripheral artery disease) ([facility] 9/7/2011  Primary osteoarthritis of both knees 9/28/2016  Rheumatoid arthritis involving ankle ([address] 9/28/2016  Rheumatoid arthritis(714.0) 9/2/2009  Seropositive rheumatoid arthritis of multiple sites ([facility] 9/28/2016  Statin intolerance 12/21/2016  Type 2 diabetes mellitus with neurologic complication, with long-term current use of insulin ([address] 9/2/2009  Type 2 diabetes with nephropathy ([facility] 8/20/2018  Unspecified essential hypertension 9/2/2009  Vitamin D deficiency 6/16/2017  Weakness due to cerebrovascular accident Past Surgical History:  
Procedure Laterality Date  HX COLECTOMY  HX HYSTERECTOMY  HX OTHER SURGICAL    
 colonoscopies numerous since 1993 Social History Socioeconomic History  Marital status:  Spouse name: Not on file  Number of children: Not on file  Years of education: Not on file  Highest education level: Not on file Occupational History  Not on file Social Needs  Financial resource strain: Not on file  Food insecurity Worry: Not on file Inability: Not on file  Transportation needs Medical: Not on file Non-medical: Not on file Tobacco Use  Smoking status: Never Smoker  Smokeless tobacco: Never Used Substance and Sexual Activity  Alcohol use: No  
 Drug use: No  
 Sexual activity: Not Currently Partners: Male Lifestyle  Physical activity Days per week: Not on file Minutes per session: Not on file  Stress: Not on file Relationships  Social connections Talks on phone: Not on file Gets together: Not on file Attends Alevism service: Not on file Active member of club or organization: Not on file Attends meetings of clubs or organizations: Not on file Relationship status: Not on file  Intimate partner violence Fear of current or ex partner: Not on file Emotionally abused: Not on file Physically abused: Not on file Forced sexual activity: Not on file Other Topics Concern  Not on file Social History Narrative  Not on file Current Facility-Administered Medications Medication Dose Route Frequency  insulin glargine (LANTUS) injection 13 Units  13 Units SubCUTAneous DAILY  NOREPINephrine (LEVOPHED) 8 mg in 5% dextrose 250mL (32 mcg/mL) infusion  0.5-30 mcg/min IntraVENous TITRATE  [START ON 8/27/2020] levothyroxine (SYNTHROID) injection 66 mcg  66 mcg IntraVENous Q24H  
 DOBUTamine (DOBUTREX) 500 mg/250 mL (2,000 mcg/mL) infusion  0-10 mcg/kg/min IntraVENous TITRATE  melatonin tablet 3 mg  3 mg Oral QHS  midodrine (PROAMATINE) tablet 10 mg  10 mg Oral TID WITH MEALS  
 amiodarone (CORDARONE) 375 mg/250 mL D5W infusion  1 mg/min IntraVENous TITRATE  alteplase (CATHFLO) 1 mg in sterile water (preservative free) 1 mL injection  1 mg InterCATHeter PRN  
 cefepime (MAXIPIME) 2 g in 0.9% sodium chloride (MBP/ADV) 100 mL  2 g IntraVENous Q24H  prochlorperazine (COMPAZINE) with saline injection 10 mg  10 mg IntraVENous Q6H PRN  
 famotidine (PEPCID) tablet 20 mg  20 mg Oral DAILY  sodium chloride (NS) flush 5-40 mL  5-40 mL IntraVENous Q8H  
 sodium chloride (NS) flush 5-40 mL  5-40 mL IntraVENous PRN  
 acetaminophen (TYLENOL) tablet 650 mg  650 mg Oral Q6H PRN Or  
 acetaminophen (TYLENOL) suppository 650 mg  650 mg Rectal Q6H PRN  polyethylene glycol (MIRALAX) packet 17 g  17 g Oral DAILY PRN  
 ondansetron (ZOFRAN) injection 4 mg  4 mg IntraVENous Q6H PRN  
 glucose chewable tablet 16 g  4 Tab Oral PRN  
 glucagon (GLUCAGEN) injection 1 mg  1 mg IntraMUSCular PRN  
 dextrose 10% infusion 0-250 mL  0-250 mL IntraVENous PRN  
 insulin lispro (HUMALOG) injection   SubCUTAneous AC&HS  aspirin delayed-release tablet 81 mg  81 mg Oral DAILY  clopidogreL (PLAVIX) tablet 75 mg  75 mg Oral DAILY  [Held by provider] carvediloL (COREG) tablet 6.25 mg  6.25 mg Oral BID  [Held by provider] lisinopriL (PRINIVIL, ZESTRIL) tablet 5 mg  5 mg Oral DAILY Objective:  
  
Physical Exam: 
Visit Vitals BP 99/59 Pulse 82 Temp 98.8 °F (37.1 °C) Resp 20 Ht 5' 1\" (1.549 m) Wt 157 lb 13.6 oz (71.6 kg) SpO2 99% BMI 29.83 kg/m² General Appearance:  Well developed Ears/Nose/Mouth/Throat:   Hearing grossly normal. 
  
    Neck: Supple. Chest:   Lungs clear to auscultation bilaterally. Cardiovascular:  Regular rhythm, 2/6 systolic LSB murmur. Abdomen:   Soft Extremities: 1-2+ edema bilaterally. Skin: Warm and dry. Data Review:  
Labs:   
Recent Results (from the past 24 hour(s)) GLUCOSE, POC Collection Time: 08/22/20 12:20 PM  
Result Value Ref Range Glucose (POC) 295 (H) 65 - 100 mg/dL Performed by Mallie Lark GLUCOSE, POC Collection Time: 08/22/20  4:23 PM  
Result Value Ref Range Glucose (POC) 283 (H) 65 - 100 mg/dL Performed by [employer] Lark GLUCOSE, POC Collection Time: 08/22/20 10:10 PM  
Result Value Ref Range Glucose (POC) 291 (H) 65 - 100 mg/dL Performed by [employer] CBC WITH AUTOMATED DIFF Collection Time: 08/23/20  3:18 AM  
Result Value Ref Range WBC 13.5 (H) 3.6 - 11.0 K/uL  
 RBC 2.69 (L) 3.80 - 5.20 M/uL HGB 7.2 (L) 11.5 - 16.0 g/dL HCT 23.1 (L) 35.0 - 47.0 % MCV 85.9 80.0 - 99.0 FL  
 MCH 26.8 26.0 - 34.0 PG  
 MCHC 31.2 30.0 - 36.5 g/dL RDW 21.4 (H) 11.5 - 14.5 % PLATELET 028 538 - 878 K/uL MPV 10.4 8.9 - 12.9 FL  
 NRBC 0.3 (H) 0  WBC ABSOLUTE NRBC 0.04 (H) 0.00 - 0.01 K/uL NEUTROPHILS 83 (H) 32 - 75 % LYMPHOCYTES 1 (L) 12 - 49 % MONOCYTES 14 (H) 5 - 13 % EOSINOPHILS 0 0 - 7 % BASOPHILS 0 0 - 1 % IMMATURE GRANULOCYTES 2 (H) 0.0 - 0.5 % ABS. NEUTROPHILS 11.2 (H) 1.8 - 8.0 K/UL  
 ABS. LYMPHOCYTES 0.1 (L) 0.8 - 3.5 K/UL  
 ABS. MONOCYTES 1.9 (H) 0.0 - 1.0 K/UL  
 ABS. EOSINOPHILS 0.0 0.0 - 0.4 K/UL  
 ABS. BASOPHILS 0.0 0.0 - 0.1 K/UL  
 ABS. IMM. GRANS. 0.3 (H) 0.00 - 0.04 K/UL  
 DF SMEAR SCANNED    
 RBC COMMENTS MACHO CELLS 
PRESENT 
    
 RBC COMMENTS OVALOCYTES PRESENT 
    
 RBC COMMENTS ANISOCYTOSIS 2+ 
    
 RBC COMMENTS POLYCHROMASIA 1+ LACTIC ACID Collection Time: 08/23/20  3:18 AM  
Result Value Ref Range  Lactic acid 0.9 0.4 - 2.0 MMOL/L  
METABOLIC PANEL, BASIC  
 Collection Time: 08/23/20  3:18 AM  
Result Value Ref Range Sodium 126 (L) 136 - 145 mmol/L Potassium 4.9 3.5 - 5.1 mmol/L Chloride 98 97 - 108 mmol/L  
 CO2 18 (L) 21 - 32 mmol/L Anion gap 10 5 - 15 mmol/L Glucose 258 (H) 65 - 100 mg/dL BUN 73 (H) 6 - 20 MG/DL Creatinine 2.59 (H) 0.55 - 1.02 MG/DL  
 BUN/Creatinine ratio 28 (H) 12 - 20 GFR est AA 21 (L) >60 ml/min/1.73m2 GFR est non-AA 18 (L) >60 ml/min/1.73m2 Calcium 6.9 (L) 8.5 - 10.1 MG/DL  
NT-PRO BNP Collection Time: 08/23/20  3:18 AM  
Result Value Ref Range NT pro-BNP >35,000 (H) <450 PG/ML  
PROCALCITONIN Collection Time: 08/23/20  3:18 AM  
Result Value Ref Range Procalcitonin 3.36 ng/mL GLUCOSE, POC Collection Time: 08/23/20  8:48 AM  
Result Value Ref Range Glucose (POC) >600 (HH) 65 - 100 mg/dL Performed by [employer] GLUCOSE, POC Collection Time: 08/23/20  8:54 AM  
Result Value Ref Range Glucose (POC) 312 (H) 65 - 100 mg/dL Performed by Joanna Lipcecille Telemetry: normal sinus rhythm Assessment/Plan 1. Paroxysmal AFIB/atrial flutter RVR but NSR on IV amiodarone 1 mg/min. 2. Hypotension/cardiogenic-septic shock On dobutamine and levophed Off coreg 
cefepime 3. Acute on chronic systolic CHF: EF 18-24% 5/98/08, (was 25% (8/14/20) -NYHA class IV 
             dobutamine 
              
4. CAD/Elevated troponin due to NSTEMI and d/t increased myocardial demand. Per Dr George Rm: cardiac catheterization from 2018. Diffuse multivessel disease. Unlikely to get durable outcome from CABG or PCI. While proximal LAD PCI could be considered as a bailout, given current multisystem failure it would not be preferred particularly given oliguric renal failure 
  
5. JEROD on CKD: Hold ACE inhibitor's.    
  
              
6. Chronic anemia  
 
7 Moderate aortic stenosis/ Mild-mod MR   
low gradient AS due to CHF           
  
 8. PFO/ASD: Left to right shunt. dobutamine instead of milrinone 
   
9. Prognosis: poor. Family is considering comfort care Gisela Melgar M.D. [facility] Electrophysiology/Cardiology [facility] [address]                             
[phone]

## 2020-08-23 NOTE — PROGRESS NOTES
1930: Bedside shift change report given to [address] [place] [address] (oncoming nurse) by Aby Gomes (offgoing nurse). Report included the following information SBAR, Kardex, Intake/Output, MAR, Recent Results, Cardiac Rhythm NSR/sinus gene and Alarm Parameters . Summary: Pt is on comfort care, son at bedside. Given prn morphine x2 for pain. 3L nasal cannula. BPs low MAPs in the 50s. Alford in place but patient is oliguric. Family refusing turns. Bathed. 2330: Bedside shift change report given to [address] [place] [address] (oncoming nurse) by [address] [place] [address] (offgoing nurse). Report included the following information SBAR, Kardex, Intake/Output, MAR, Recent Results, Cardiac Rhythm NSR and Alarm Parameters .

## 2020-08-23 NOTE — PROGRESS NOTES
[facility] 
 
ICU TEAM Progress Note Name: Celestina Otero : 1935 MRN: [id number] Date: 2020 I Subjective:  
Progress Note: 2020 Reason for ICU Admission: Cardiogenic shock, encephalopathy, status post brief cardiac arrest 
 
Interval history: 
26-year-old female with extensive medical history including diffuse coronary artery disease with systolic heart failure with ejection fraction of 25%, metastatic breast cancer, history of colon cancer and a plethora of other medical problem readmitted to the hospital on  after being discharged for less than 24-hour.  Came in with increased work of breathing and some hypoxia with hypotension.  Found to have worsening pleural effusion and pericardial effusion and sustained a brief cardiac arrest requiring a brief period of intubation on the evening of the .  Now in the ICU profoundly debilitated receiving antibiotic diuretics and now on low-dose norepinephrine milrinone and was started on amiodarone drip as well.  Usually on nasal cannula but requiring BiPAP at times.  Despite multiple discussion with the family they changed her CODE STATUS and goals of care to full code. Overnight Events:  
No acute event, overall condition deteriorating. More lethargic and encephalopathic, had to increase Levophed dose. Remained oliguric. Barely responsive. Family change the CODE STATUS to DNR/DNI and they are considering comfort measures only later today. Active Problem List:  
 
Problem List  Date Reviewed: 2020 Codes Class Heart failure ([facility] ICD-10-CM: I50.9 ICD-9-CM: 428.9 CHF (congestive heart failure) ([facility]) ICD-10-CM: I50.9 ICD-9-CM: 428.0 Bacteremia due to Gram-negative bacteria ICD-10-CM: R78.81 ICD-9-CM: 790.7, 041.85   
   
 UTI (urinary tract infection) ICD-10-CM: N39.0 ICD-9-CM: 599.0 Septic shock (HCC) ICD-10-CM: A41.9, R65.21 ICD-9-CM: 038.9, 785.52, 995.92   
   
 Dizziness ICD-10-CM: C79 ICD-9-CM: 780.4 Type 2 diabetes with nephropathy (HCC) ICD-10-CM: E11.21 
ICD-9-CM: 250.40, 583.81   
   
 CAD (coronary artery disease) ICD-10-CM: I25.10 ICD-9-CM: 414.00 Metastatic breast cancer ([facility] ICD-10-CM: B00.166 ICD-9-CM: 174.9 CKD (chronic kidney disease) stage 3, GFR 30-59 ml/min (HCC) ICD-10-CM: N18.3 ICD-9-CM: 783. 3 Vitamin D deficiency ICD-10-CM: E55.9 ICD-9-CM: 268.9 Asymptomatic hyperuricemia ICD-10-CM: E79.0 ICD-9-CM: 790.6 Statin intolerance ICD-10-CM: Z78.9 ICD-9-CM: 995.27 Long-term use of immunosuppressant medication ICD-10-CM: Z79.899 ICD-9-CM: V58.69 Seropositive rheumatoid arthritis of multiple sites [facility]) ICD-10-CM: M05.79 ICD-9-CM: 714.0 Primary osteoarthritis of both knees ICD-10-CM: M17.0 ICD-9-CM: 715.16 Weakness due to cerebrovascular accident ICD-10-CM: [id number] ICD-9-CM: [id number] PAD (peripheral artery disease) ([facility]) ICD-10-CM: I73.9 ICD-9-CM: 443.9 Carotid bruit ICD-10-CM: R09.89 ICD-9-CM: 635. 9 Hyperlipidemia ICD-10-CM: E78.5 ICD-9-CM: 272.4 Type 2 diabetes mellitus with neurologic complication, with long-term current use of insulin (HCC) ICD-10-CM: E11.49, Z79.4 ICD-9-CM: 250.60, V58.67 Colon cancer [facility]) ICD-10-CM: C18.9 ICD-9-CM: 153.9 Age-related osteoporosis without current pathological fracture ICD-10-CM: M81.0 ICD-9-CM: 733.01   
   
 HTN, goal below 140/90 ICD-10-CM: I10 
ICD-9-CM: 401.9 Anemia ICD-10-CM: D64.9 ICD-9-CM: 285.9 Past Medical History:  
 
 has a past medical history of Acute on chronic systolic HF (heart failure) ([facility] (5/19/2018), Age-related osteoporosis without current pathological fracture (9/2/2009), Anemia (9/2/2009), Asymptomatic hyperuricemia (2/16/2017), Breast cancer ([facility] CAD (coronary artery disease) (6/21/2018), Carotid bruit (8/31/2011), CHF (congestive heart failure) ([facility] CKD (chronic kidney disease) stage 3, GFR 30-59 ml/min ([facility] (10/25/2017), Colon cancer ([facility] (9/2/2009), Colon cancer ([facility] (9/2/2009), COVID-19, DM (diabetes mellitus) ([facility] (9/2/2009), GERD (gastroesophageal reflux disease), H/O: CVA (9/2/2009), Heart attack ([facility] HTN, goal below 140/90 (9/2/2009), Hypothyroid (9/2/2009), Ill-defined condition, Long-term use of immunosuppressant medication (11/21/2016), Metastatic breast cancer ([facility] (6/1/2018), Microalbuminuria (9/2/2009), Osteoporosis (9/2/2009), Other and unspecified hyperlipidemia (1/27/2010), PAD (peripheral artery disease) ([facility] (9/7/2011), Primary osteoarthritis of both knees (9/28/2016), Rheumatoid arthritis involving ankle ([facility] (9/28/2016), Rheumatoid arthritis(714.0) (9/2/2009), Seropositive rheumatoid arthritis of multiple sites ([facility] (9/28/2016), Statin intolerance (12/21/2016), Type 2 diabetes mellitus with neurologic complication, with long-term current use of insulin ([facility] (9/2/2009), Type 2 diabetes with nephropathy ([facility] (8/20/2018), Unspecified essential hypertension (9/2/2009), Vitamin D deficiency (6/16/2017), and Weakness due to cerebrovascular accident. Past Surgical History:  
 
 has a past surgical history that includes hx colectomy; hx hysterectomy; and hx other surgical. 
 
Home Medications:  
 
Prior to Admission medications Medication Sig Start Date End Date Taking? Authorizing Provider  
furosemide (LASIX) 40 mg tablet Take 1 Tab by mouth two (2) times a day. 8/16/20  Yes Brigid Kirby, DO  
lisinopriL (PRINIVIL, ZESTRIL) 5 mg tablet Take 1 Tab by mouth daily. 8/17/20  Yes Brigid Kirby, DO  
carvediloL (Coreg) 6.25 mg tablet Take 6.25 mg by mouth two (2) times daily (with meals).    Yes Provider, Historical  
clopidogreL (PLAVIX) 75 mg tab TAKE ONE TABLET BY MOUTH DAILY 6/2/20  Yes Lea Quinonez MD  
 cholecalciferol (Vitamin D3) 25 mcg (1,000 unit) cap Take 1,000 Units by mouth daily. Yes Provider, Historical  
glucosam/wesley-msm1/C/bassam/bosw (OSTEO BI-FLEX TRIPLE STRENGTH PO) Take 2 Tabs by mouth daily. Yes Evelia Sullivan MD  
evolocumab (Repatha SureClick) pen injection 568 mg by SubCUTAneous route every fourteen (14) days. Yes Provider, Historical  
insulin aspart U-100 (NOVOLOG FLEXPEN U-100 INSULIN) 100 unit/mL (3 mL) inpn by SubCUTAneous route Before breakfast, lunch, dinner and at bedtime. 2-3 units depending on blood sugar before meals. (Note: son states very sensitive.)   Yes Provider, Historical  
multivitamin (ONE A DAY) tablet Take 1 Tab by mouth daily. Yes Provider, Historical  
insulin degludec (TRESIBA FLEXTOUCH U-100) 100 unit/mL (3 mL) inpn 14 Units by SubCUTAneous route daily. Yes Provider, Historical  
nitroglycerin (NITROSTAT) 0.4 mg SL tablet 1 Tab by SubLINGual route as needed for Chest Pain. Up to 3 doses. Patient taking differently: 1 Tab by SubLINGual route every five (5) minutes as needed for Chest Pain. Up to 3 doses. 6/6/18  Yes Hugo Simmons MD  
levothyroxine (SYNTHROID) 88 mcg tablet Take 88 mcg by mouth Daily (before breakfast). Yes Provider, Historical  
aspirin delayed-release 81 mg tablet Take 81 mg by mouth daily. Yes Provider, Historical  
OMEGA-3 FATTY ACIDS/FISH OIL (OMEGA 3 FISH OIL PO) Take  by mouth daily. Yes Provider, Historical  
 
 
Allergies/Social/Family History: Allergies Allergen Reactions  Statins-Hmg-Coa Reductase Inhibitors Other (comments) Intolerant to statins  Sulfa (Sulfonamide Antibiotics) Other (comments)  
  syncope Social History Tobacco Use  Smoking status: Never Smoker  Smokeless tobacco: Never Used Substance Use Topics  Alcohol use: No  
  
Family History Problem Relation Age of Onset  Diabetes Mother  Stroke Mother  Diabetes Sister  Other Sister fell and hit her head -  of this  Diabetes Brother  Cancer Father   
     stomach  Diabetes Sister Review of Systems:  
 
Not able to obtain due to her medical condition Objective:  
Vital Signs: 
Visit Vitals /61 Pulse 85 Temp 99.1 °F (37.3 °C) Resp 24 Ht 5' 1\" (1.549 m) Wt 71.6 kg (157 lb 13.6 oz) SpO2 99% BMI 29.83 kg/m² O2 Flow Rate (L/min): 3 l/min O2 Device: Nasal cannula Temp (24hrs), Av.9 °F (37.2 °C), Min:98.4 °F (36.9 °C), Max:99.6 °F (37.6 °C) CVP (mmHg): 21 mmHg (20 0100) Intake/Output:  
 
Intake/Output Summary (Last 24 hours) at 2020 1749 Last data filed at 2020 0600 Gross per 24 hour Intake 1628.88 ml Output 340 ml Net 1288.88 ml Physical Exam: 
 
General:   Obtunded poorly responsive, occasional agonal breathing Eyes:  Sclera anicteric. Pupils equally round and reactive to light. Mouth/Throat: Mucous membranes normal, oral pharynx clear Neck: Supple Lungs:    Good air entry bilaterally, fine crepitation bilaterally no wheezing CV:   Irregular rate and rhythm,no murmur, click, rub or gallop Abdomen:   Soft, non-tender. bowel sounds normal. non-distended Extremities: No cyanosis or edema Skin: Open wound under L breast (erythematous, ulcerated) Lymph nodes: Cervical and supraclavicular normal  
Musculoskeletal:  +1 edema Lines/Devices:  Intact, no erythema, drainage or tenderness Psych:  Obtunded and not responsive, open eyes to painful stimuli, moves right arm occasionally, not oriented  
  
 
 
LABS AND  DATA: Personally reviewed Recent Labs  
  20 WBC 13.5* 12.2* HGB 7.2* 8.0*  
HCT 23.1* 25.8*  
 173 Recent Labs  
  20 * 129*  
K 4.9 4.5  
CL 98 99 CO2 18* 20* BUN 73* 68* CREA 2.59* 2.33* * 310* CA 6.9* 7.5* MG  --  2.2 Recent Labs  
  20 
9526 ALB 2.1*  
 
 No results for input(s): INR, PTP, APTT, INREXT in the last 72 hours. Recent Labs  
  08/21/20 
1137 08/21/20 
0414 PHI 7.25* 7.35  
PCO2I 36.4 28.8*  
PO2I 35* 106* FIO2I 0.30  -- No results for input(s): CPK, CKMB, TROIQ, BNPP in the last 72 hours. Hemodynamics:  
PAP:   CO:    
Wedge:   CI:    
CVP:  CVP (mmHg): 21 mmHg (08/22/20 0100) SVR:    
  PVR:    
 
Ventilator Settings: 
Mode Rate Tidal Volume Pressure FiO2 PEEP  
         30 % Peak airway pressure:     
Minute ventilation: 9.1 l/min MEDS: Reviewed Chest X-Ray: CXR Results  (Last 48 hours) None Assessment and Plan: 1. PEA Arrest (17 Aug 2020 - CPR - ROSC) 
a. CAD, diffuse coronary artery disease not amenable to intervention 
b. Cardiogenic shock 
c. Pericardial Effusion: Chronic without tamponade 
d. Acute on Chronic CHF, worsening ejection fraction now 20 to 25% 
i. Bilateral Pleural Effusions 
e. Anemia 2. Metastatic Breast Cancer (radiation treatment) a. Open Wound Under Left Breast (prior to admission) 3. Septic Shock (from UTI prior to admission) a. UTI (multiple Klebsiella UTIs in past) 4. DM2: Blood sugar not at goal, adjusting insulin doses 5. Colon Cancer 6. Acute kidney injury on top of chronic kidney disease stage III-IV 7. Encephalopathy [ICU delirium/toxic/metabolic] Patient condition continued To deteriorate, she is now obtunded requiring higher pressors doses. Despite optimal medical management her condition deteriorated. She has advanced age with metastatic cancer and advanced coronary artery disease that not amenable to any intervention. Family change the CODE STATUS to DNR yesterday. The son approached me today and they are considering moving to comfort measures only later this afternoon. I will continue current management and will follow up with the family. DISPOSITION Stay in ICU CRITICAL CARE CONSULTANT NOTE I had a face to face encounter with the patient, reviewed and interpreted patient data including clinical events, labs, images, vital signs, I/O's, and examined patient. I have discussed the case and the plan and management of the patient's care with the consulting services, the bedside nurses and the respiratory therapist.   
 
NOTE OF PERSONAL INVOLVEMENT IN CARE This patient has a high probability of imminent, clinically significant deterioration, which requires the highest level of preparedness to intervene urgently. I participated in the decision-making and personally managed or directed the management of the following life and organ supporting interventions that required my frequent assessment to treat or prevent imminent deterioration. I personally spent 40 minutes of critical care time. This is time spent at this critically ill patient's bedside actively involved in patient care as well as the coordination of care and discussions with the patient's family. This does not include any procedural time which has been billed separately. Felipa Saini M.D. Staff Intensivist/Pulmonologist 
[facility] 8/23/2020

## 2020-08-24 NOTE — DISCHARGE SUMMARY
[facility] Discharge Summary Patient: Fabiano Tello MRN: [id number] YOB: 1935 Age: 82 y.o. Date of admission:  2020 Date of discharge:  2020 Primary care provider:  Yumi William MD  
 
Admitting provider:  Rodgers Curling, MD 
 
Discharging provider(s): Alize Arriola, DO - Staff [address] Consultations · IP CONSULT TO CARDIOLOGY Procedures · None for this encounter Discharge destination: Patient . Admission diagnosis · Heart failure ([address] [I50.9] Please refer to the admission history and physical for details on the presenting problem. Final discharge diagnoses and brief hospital course PEA Arrest  Cardiogenic shock Acute on chronic systolic heart failure Ischemic cardiomyopathy Diffuse CAD, not amenable to intervention Septic shock (resolved) d/t Kleb UTI 
DM2 Colon CA, h/o Encephalopathy, acute, toxic/metabolic Metastatic breast cancer s/p XRT w/ open wound under left breast (POA) DM2 JEROD/CKD3 RA 
PAD Patient was admitted with dyspnea less than 24h after discharge for NSTEMI. She was found to be in worsening acute on chronic SHF. Suffered a PEA arrest with rapid ROSC. Extubated easily the day after. Respiratory status then declined and BiPAP was needed. Attempted diuresis. NSTEMI noted with known prior h/o diffuse CAD not amenable to intervention in the past. Course was complicated by development of Afib. She was concomitantly treated for Kleb UTI, which she has a h/o chronic/recurrent Kleb UTIs. Clinical status continued to decline, and family approached with desire for deescalation and withdrawal of support. She was made Comfort Measures Only on  and passed 2020 at 1120. Physical examination at discharge Visit Vitals BP (!) 81/48 Pulse 78 Temp 97.3 °F (36.3 °C) Resp 19 Ht 5' 1\" (1.549 m) Wt 74 kg (163 lb 2.3 oz) SpO2 100% BMI 30.83 kg/m² General:   Obtunded poorly responsive, occasional agonal breathing Eyes:  Sclera anicteric. Pupils equally round and reactive to light. Mouth/Throat: Mucous membranes normal, oral pharynx clear Neck: Supple Lungs:    Good air entry bilaterally, fine crepitation bilaterally no wheezing CV:   Irregular rate and rhythm,no murmur, click, rub or gallop Abdomen:   Soft, non-tender. bowel sounds normal. non-distended Extremities: No cyanosis or edema Skin: Open wound under L breast (erythematous, ulcerated) Lymph nodes: Cervical and supraclavicular normal  
Musculoskeletal:  +1 edema Lines/Devices:  Intact, no erythema, drainage or tenderness Neuro/Psych:  Obtunded and not responsive, open eyes to painful stimuli, moves right arm occasionally, not oriented Recent Labs  
  08/23/20 0318 08/22/20 
0051 WBC 13.5* 12.2* HGB 7.2* 8.0*  
HCT 23.1* 25.8*  
 173 Recent Labs  
  08/23/20 0318 08/22/20 
0051 08/21/20 
1715 * 129* 129*  
K 4.9 4.5 4.6 CL 98 99 101 CO2 18* 20* 19* BUN 73* 68* 68* CREA 2.59* 2.33* 2.24* * 310* 323* CA 6.9* 7.5* 7.3*  
MG  --  2.2  -- No results for input(s): AP, TBIL, TP, ALB, GLOB, GGT, AML, LPSE in the last 72 hours. No lab exists for component: SGOT, GPT, AMYP, HLPSE No results for input(s): INR, PTP, APTT, INREXT in the last 72 hours. No results for input(s): FE, TIBC, PSAT, FERR in the last 72 hours. No results for input(s): PH, PCO2, PO2 in the last 72 hours. No results for input(s): CPK, CKMB in the last 72 hours. No lab exists for component: TROPONINI No components found for: [place] Pertinent imaging studies: 
 
Echo 8/17 Result status: Final result · LV: Estimated LVEF is 20 - 25% with severe global, anteroapical hypokinesis. · AV: Mild aortic valve stenosis is present. Aortic valve mean gradient is 12 mmHg. · MV: Mild to moderate mitral valve regurgitation is present. · Pericardium: Small circumferential pericardial effusion. No indications of tamponade present.  
 
 
--------------------------------- Chronic Diagnoses:   
Problem List as of 8/24/2020 Date Reviewed: 7/23/2020 Codes Class Noted - Resolved Heart failure ([address] ICD-10-CM: I50.9 ICD-9-CM: 428.9  8/17/2020 - Present CHF (congestive heart failure) ([facility]) ICD-10-CM: I50.9 ICD-9-CM: 428.0  8/12/2020 - Present Bacteremia due to Gram-negative bacteria ICD-10-CM: R78.81 ICD-9-CM: 790.7, 041.85  4/22/2020 - Present UTI (urinary tract infection) ICD-10-CM: N39.0 ICD-9-CM: 599.0  4/19/2020 - Present Septic shock ([facility]) ICD-10-CM: A41.9, R65.21 ICD-9-CM: 038.9, 785.52, 995.92  12/25/2019 - Present Dizziness ICD-10-CM: Y96 ICD-9-CM: 780.4  8/25/2019 - Present Type 2 diabetes with nephropathy ([address] ICD-10-CM: E11.21 
ICD-9-CM: 250.40, 583.81  8/20/2018 - Present CAD (coronary artery disease) ICD-10-CM: I25.10 ICD-9-CM: 414.00  6/21/2018 - Present Metastatic breast cancer ([address] ICD-10-CM: I12.749 ICD-9-CM: 174.9  6/1/2018 - Present CKD (chronic kidney disease) stage 3, GFR 30-59 ml/min ([facility]) ICD-10-CM: N18.3 ICD-9-CM: 585.3  10/25/2017 - Present Vitamin D deficiency ICD-10-CM: E55.9 ICD-9-CM: 268.9  6/16/2017 - Present Asymptomatic hyperuricemia ICD-10-CM: E79.0 ICD-9-CM: 790.6  2/16/2017 - Present Statin intolerance ICD-10-CM: Z78.9 ICD-9-CM: 995.27  12/21/2016 - Present Long-term use of immunosuppressant medication ICD-10-CM: Z79.899 ICD-9-CM: V58.69  11/21/2016 - Present Seropositive rheumatoid arthritis of multiple sites [facility]) ICD-10-CM: M05.79 ICD-9-CM: 714.0  9/28/2016 - Present Primary osteoarthritis of both knees ICD-10-CM: M17.0 ICD-9-CM: 715.16  9/28/2016 - Present Weakness due to cerebrovascular accident ICD-10-CM: WRY5101 ICD-9-CM: KWP3520  4/2/2012 - Present PAD (peripheral artery disease) (HCC) ICD-10-CM: I73.9 ICD-9-CM: 443.9  9/7/2011 - Present Carotid bruit ICD-10-CM: R09.89 ICD-9-CM: 785.9  8/31/2011 - Present Hyperlipidemia ICD-10-CM: E78.5 ICD-9-CM: 272.4  1/27/2010 - Present Type 2 diabetes mellitus with neurologic complication, with long-term current use of insulin (HCC) ICD-10-CM: E11.49, Z79.4 ICD-9-CM: 250.60, V58.67  9/2/2009 - Present Colon cancer [facility]) ICD-10-CM: C18.9 ICD-9-CM: 153.9  9/2/2009 - Present Age-related osteoporosis without current pathological fracture ICD-10-CM: M81.0 ICD-9-CM: 733.01  9/2/2009 - Present HTN, goal below 140/90 ICD-10-CM: I10 
ICD-9-CM: 401.9  9/2/2009 - Present Anemia ICD-10-CM: D64.9 ICD-9-CM: 285.9  9/2/2009 - Present RESOLVED: Systemic inflammatory response syndrome (HCC) ICD-10-CM: R65.10 ICD-9-CM: 995.90  4/19/2020 - 7/23/2020 RESOLVED: Acute respiratory failure ([facility] ICD-10-CM: J96.00 
ICD-9-CM: 518.81  6/21/2018 - 8/8/2019 RESOLVED: Elevated troponin ICD-10-CM: R79.89 ICD-9-CM: 790.6  6/21/2018 - 8/8/2019 RESOLVED: Acute renal failure superimposed on stage 3 chronic kidney disease (HCC) ICD-10-CM: N17.9, N18.3 ICD-9-CM: 584.9, 585.3  6/21/2018 - 2/18/2019 RESOLVED: Gout flare ICD-10-CM: M10.9 ICD-9-CM: 274.01  6/21/2018 - 8/8/2019 RESOLVED: Hyperglycemia due to type 2 diabetes mellitus ([facility] ICD-10-CM: E11.65 ICD-9-CM: 250.00  6/21/2018 - 8/8/2019 RESOLVED: Fluid overload ICD-10-CM: E87.70 ICD-9-CM: 276.69  6/1/2018 - 6/18/2018 RESOLVED: Goals of care, counseling/discussion ICD-10-CM: Z71.89 ICD-9-CM: V65.49  6/1/2018 - 8/8/2019  RESOLVED: Acute on chronic systolic HF (heart failure) (HCC) ICD-10-CM: Y42.69 
 ICD-9-CM: 428.23  5/19/2018 - 7/23/2020 RESOLVED: Acute systolic heart failure (HCC) ICD-10-CM: I50.21 ICD-9-CM: 428.21  4/24/2018 - 8/8/2019 RESOLVED: Altered mental status, unspecified ICD-10-CM: R41.82 
ICD-9-CM: 780.97  4/23/2018 - 8/8/2019 RESOLVED: SOB (shortness of breath) ICD-10-CM: R06.02 
ICD-9-CM: 786.05  4/16/2018 - 7/4/2018 RESOLVED: Occlusion and stenosis of carotid artery without mention of cerebral infarction ICD-10-CM: I65.29 ICD-9-CM: 433.10  8/23/2013 - 8/8/2019 RESOLVED: Neuropathy in diabetes ([facility] ICD-10-CM: E11.40 ICD-9-CM: 250.60, 357.2  4/2/2012 - 8/8/2019 RESOLVED: Claudication ([facility] ICD-10-CM: I73.9 ICD-9-CM: 443.9  8/31/2011 - 8/8/2019 RESOLVED: Weakness of left arm ICD-10-CM: R29.898 ICD-9-CM: 729.89  8/31/2011 - 8/8/2019 RESOLVED: Hypothyroid ICD-10-CM: E03.9 ICD-9-CM: 244.9  9/2/2009 - 8/8/2019 RESOLVED: H/O: CVA ICD-9-CM: V12.54  9/2/2009 - 8/8/2019 RESOLVED: Microalbuminuria ICD-10-CM: R80.9 ICD-9-CM: 791.0  9/2/2009 - 8/8/2019 Time spent on discharge related activities today greater than 30 minutes. Signed:  
 
 Luis Alfredo Lira DO Staff [address] 8/24/2020 
 11:30 AM 
 Attending Cc:  Nyla Oconnor MD

## 2020-08-24 NOTE — PROGRESS NOTES
Death Declaration [facility] Pt Name  Galo Esteban Admit date:  8/16/2020 Date and time of death:  8/24/2020 @ 1120 am  
Room Number  94 [id number] Medical Record Number  [id number] @ . [address]  
Age  82 y.o. Date of Birth 1935 PCP Trish Zamora MD  
Attending physician Josué Jones DO Code Status  DNR/DNI/CMO Patient seen and examined Mental status   Unresponsive Pupils Dilated and Fixed Respiration Nil Pulse  Absent Heart Sounds  Absent Rhythm  Flat line Family  Notified by Nursing staff and on the way in [facility]  Notified by Nursing staff Prelim COD = acute on chronic systolic heart failure d/t diffuse CAD in setting of metastatic breast cancer Death certificate and discharge summary completion remain  Dr. Josué Jones DO's responsibility. 8/24/2020

## 2020-08-24 NOTE — PROGRESS NOTES
Problem: Diabetes Self-Management Goal: *Disease process and treatment process Description: Define diabetes and identify own type of diabetes; list 3 options for treating diabetes. Outcome: Progressing Towards Goal 
Goal: *Incorporating nutritional management into lifestyle Description: Describe effect of type, amount and timing of food on blood glucose; list 3 methods for planning meals. Outcome: Progressing Towards Goal 
Goal: *Incorporating physical activity into lifestyle Description: State effect of exercise on blood glucose levels. Outcome: Progressing Towards Goal 
Goal: *Developing strategies to promote health/change behavior Description: Define the ABC's of diabetes; identify appropriate screenings, schedule and personal plan for screenings. Outcome: Progressing Towards Goal 
Goal: *Using medications safely Description: State effect of diabetes medications on diabetes; name diabetes medication taking, action and side effects. Outcome: Progressing Towards Goal 
Goal: *Monitoring blood glucose, interpreting and using results Description: Identify recommended blood glucose targets  and personal targets. Outcome: Progressing Towards Goal 
Goal: *Prevention, detection, treatment of acute complications Description: List symptoms of hyper- and hypoglycemia; describe how to treat low blood sugar and actions for lowering  high blood glucose level. Outcome: Progressing Towards Goal 
Goal: *Prevention, detection and treatment of chronic complications Description: Define the natural course of diabetes and describe the relationship of blood glucose levels to long term complications of diabetes. Outcome: Progressing Towards Goal 
Goal: *Developing strategies to address psychosocial issues Description: Describe feelings about living with diabetes; identify support needed and support network Outcome: Progressing Towards Goal 
Goal: *Insulin pump training Outcome: Progressing Towards Goal 
 Goal: *Sick day guidelines Outcome: Progressing Towards Goal 
Goal: *Patient Specific Goal (EDIT GOAL, INSERT TEXT) Outcome: Progressing Towards Goal 
  
Problem: Patient Education: Go to Patient Education Activity Goal: Patient/Family Education Outcome: Progressing Towards Goal 
  
Problem: Risk for Spread of Infection Goal: Prevent transmission of infectious organism to others Description: Prevent the transmission of infectious organisms to other patients, staff members, and visitors. Outcome: Progressing Towards Goal 
  
Problem: Patient Education:  Go to Education Activity Goal: Patient/Family Education Outcome: Progressing Towards Goal 
  
Problem: Falls - Risk of 
Goal: *Absence of Falls Description: Document Ayla Hughes Fall Risk and appropriate interventions in the flowsheet. Outcome: Progressing Towards Goal 
Note: Fall Risk Interventions: 
Mobility Interventions: Communicate number of staff needed for ambulation/transfer Mentation Interventions: Adequate sleep, hydration, pain control Medication Interventions: Evaluate medications/consider consulting pharmacy Elimination Interventions: Bed/chair exit alarm History of Falls Interventions: Bed/chair exit alarm Problem: Patient Education: Go to Patient Education Activity Goal: Patient/Family Education Outcome: Progressing Towards Goal 
  
Problem: Pressure Injury - Risk of 
Goal: *Prevention of pressure injury Description: Document Calderon Scale and appropriate interventions in the flowsheet. Outcome: Progressing Towards Goal 
  
Problem: Patient Education: Go to Patient Education Activity Goal: Patient/Family Education Outcome: Progressing Towards Goal 
  
Problem: Pain Goal: *Control of Pain Outcome: Progressing Towards Goal 
Goal: *PALLIATIVE CARE:  Alleviation of Pain Outcome: Progressing Towards Goal 
  
Problem: Patient Education: Go to Patient Education Activity Goal: Patient/Family Education Outcome: Progressing Towards Goal 
  
Problem: Patient Education: Go to Patient Education Activity Goal: Patient/Family Education Outcome: Progressing Towards Goal 
  
Problem: AMI: Day 3 Goal: Off Pathway (Use only if patient is Off Pathway) Outcome: Progressing Towards Goal 
Goal: Activity/Safety Outcome: Progressing Towards Goal 
Goal: Consults, if ordered Outcome: Progressing Towards Goal 
Goal: Diagnostic Test/Procedures Outcome: Progressing Towards Goal 
Goal: Nutrition/Diet Outcome: Progressing Towards Goal 
Goal: Discharge Planning Outcome: Progressing Towards Goal 
Goal: Medications Outcome: Progressing Towards Goal 
Goal: Respiratory Outcome: Progressing Towards Goal 
Goal: Treatments/Interventions/Procedures Outcome: Progressing Towards Goal 
Goal: Psychosocial 
Outcome: Progressing Towards Goal 
Goal: *Optimal pain control at patient's stated goal 
Outcome: Progressing Towards Goal 
Goal: *Hemodynamically stable Outcome: Progressing Towards Goal 
Goal: *Demonstrates progressive activity Outcome: Progressing Towards Goal 
Goal: *Tolerating diet Outcome: Progressing Towards Goal 
  
Problem: AMI: Day 4 Goal: Off Pathway (Use only if patient is Off Pathway) Outcome: Progressing Towards Goal 
Goal: Activity/Safety Outcome: Progressing Towards Goal 
Goal: Diagnostic Test/Procedures Outcome: Progressing Towards Goal 
Goal: Nutrition/Diet Outcome: Progressing Towards Goal 
Goal: Discharge Planning Outcome: Progressing Towards Goal 
Goal: Medications Outcome: Progressing Towards Goal 
Goal: Respiratory Outcome: Progressing Towards Goal 
Goal: Treatments/Interventions/Procedures Outcome: Progressing Towards Goal 
Goal: Psychosocial 
Outcome: Progressing Towards Goal 
Goal: *Optimal pain control at patient's stated goal 
Outcome: Progressing Towards Goal 
Goal: *Hemodynamically stable Outcome: Progressing Towards Goal 
Goal: *Demonstrates progressive activity Outcome: Progressing Towards Goal 
Goal: *Tolerating diet Outcome: Progressing Towards Goal 
  
Problem: AMI: Day 5 Goal: Off Pathway (Use only if patient is Off Pathway) Outcome: Progressing Towards Goal 
Goal: Activity/Safety Outcome: Progressing Towards Goal 
Goal: Diagnostic Test/Procedures Outcome: Progressing Towards Goal 
Goal: Nutrition/Diet Outcome: Progressing Towards Goal 
Goal: Discharge Planning Outcome: Progressing Towards Goal 
Goal: Medications Outcome: Progressing Towards Goal 
Goal: Treatments/Interventions/Procedures Outcome: Progressing Towards Goal 
Goal: Psychosocial 
Outcome: Progressing Towards Goal 
  
Problem: AMI: Discharge Outcomes Goal: *Demonstrates ability to perform prescribed activity without shortness of breath or discomfort Outcome: Progressing Towards Goal 
Goal: *Verbalizes understanding and describes prescribed diet Outcome: Progressing Towards Goal 
Goal: *Describes follow-up/return visits to physicians Outcome: Progressing Towards Goal 
Goal: *Describes home care/support arrangements established based on need Outcome: Progressing Towards Goal 
Goal: *Anxiety reduced or absent Outcome: Progressing Towards Goal 
Goal: *Understands and describes signs and symptoms to report to providers(Stroke Metric) Outcome: Progressing Towards Goal 
Goal: *Verbalizes name, dosage, time, side effects, and number of days to continue medications Outcome: Progressing Towards Goal 
  
Problem: Patient Education: Go to Patient Education Activity Goal: Patient/Family Education Outcome: Progressing Towards Goal 
  
Problem: Heart Failure: Day 3 Goal: Off Pathway (Use only if patient is Off Pathway) Outcome: Progressing Towards Goal 
Goal: Activity/Safety Outcome: Progressing Towards Goal 
Goal: Diagnostic Test/Procedures Outcome: Progressing Towards Goal 
Goal: Nutrition/Diet Outcome: Progressing Towards Goal 
Goal: Discharge Planning Outcome: Progressing Towards Goal 
 Goal: Medications Outcome: Progressing Towards Goal 
Goal: Respiratory Outcome: Progressing Towards Goal 
Goal: Treatments/Interventions/Procedures Outcome: Progressing Towards Goal 
Goal: Psychosocial 
Outcome: Progressing Towards Goal 
Goal: *Oxygen saturation within defined limits Outcome: Progressing Towards Goal 
Goal: *Hemodynamically stable Outcome: Progressing Towards Goal 
Goal: *Optimal pain control at patient's stated goal 
Outcome: Progressing Towards Goal 
Goal: *Anxiety reduced or absent Outcome: Progressing Towards Goal 
Goal: *Demonstrates progressive activity Outcome: Progressing Towards Goal 
  
Problem: Heart Failure: Day 4 Goal: Off Pathway (Use only if patient is Off Pathway) Outcome: Progressing Towards Goal 
Goal: Activity/Safety Outcome: Progressing Towards Goal 
Goal: Diagnostic Test/Procedures Outcome: Progressing Towards Goal 
Goal: Nutrition/Diet Outcome: Progressing Towards Goal 
Goal: Discharge Planning Outcome: Progressing Towards Goal 
Goal: Medications Outcome: Progressing Towards Goal 
Goal: Respiratory Outcome: Progressing Towards Goal 
Goal: Treatments/Interventions/Procedures Outcome: Progressing Towards Goal 
Goal: Psychosocial 
Outcome: Progressing Towards Goal 
Goal: *Oxygen saturation within defined limits Outcome: Progressing Towards Goal 
Goal: *Hemodynamically stable Outcome: Progressing Towards Goal 
Goal: *Optimal pain control at patient's stated goal 
Outcome: Progressing Towards Goal 
Goal: *Anxiety reduced or absent Outcome: Progressing Towards Goal 
Goal: *Demonstrates progressive activity Outcome: Progressing Towards Goal 
  
Problem: Heart Failure: Day 5 Goal: Off Pathway (Use only if patient is Off Pathway) Outcome: Progressing Towards Goal 
Goal: Activity/Safety Outcome: Progressing Towards Goal 
Goal: Diagnostic Test/Procedures Outcome: Progressing Towards Goal 
Goal: Nutrition/Diet Outcome: Progressing Towards Goal 
 Goal: Discharge Planning Outcome: Progressing Towards Goal 
Goal: Medications Outcome: Progressing Towards Goal 
Goal: Respiratory Outcome: Progressing Towards Goal 
Goal: Treatments/Interventions/Procedures Outcome: Progressing Towards Goal 
Goal: Psychosocial 
Outcome: Progressing Towards Goal 
  
Problem: Heart Failure: Discharge Outcomes Goal: *Demonstrates ability to perform prescribed activity without shortness of breath or discomfort Outcome: Progressing Towards Goal 
Goal: *Left ventricular function assessment completed prior to or during stay, or planned for post-discharge Outcome: Progressing Towards Goal 
Goal: *ACEI prescribed if LVEF less than 40% and no contraindications or ARB prescribed Outcome: Progressing Towards Goal 
Goal: *Verbalizes understanding and describes prescribed diet Outcome: Progressing Towards Goal 
Goal: *Verbalizes understanding/describes prescribed medications Outcome: Progressing Towards Goal 
Goal: *Describes available resources and support systems Description: (eg: Home Health, Palliative Care, [employer] Directive) Outcome: Progressing Towards Goal 
Goal: *Describes smoking cessation resources Outcome: Progressing Towards Goal 
Goal: *Understands and describes signs and symptoms to report to providers(Stroke Metric) Outcome: Progressing Towards Goal 
Goal: *Describes/verbalizes understanding of follow-up/return appt Description: (eg: to physicians, diabetes treatment coordinator, and other resources Outcome: Progressing Towards Goal 
Goal: *Describes importance of continuing daily weights and changes to report to physician Outcome: Progressing Towards Goal 
  
Problem: Infection - Risk of, Central Venous Catheter-Associated Bloodstream Infection Goal: *Absence of infection signs and symptoms Outcome: Progressing Towards Goal 
  
Problem: Patient Education: Go to Patient Education Activity Goal: Patient/Family Education Outcome: Progressing Towards Goal 
  
Problem: Infection - Risk of, Urinary Catheter-Associated Urinary Tract Infection Goal: *Absence of infection signs and symptoms Outcome: Progressing Towards Goal 
  
Problem: Patient Education: Go to Patient Education Activity Goal: Patient/Family Education Outcome: Progressing Towards Goal 
  
Problem: Hypotension Goal: *Blood pressure within specified parameters Outcome: Progressing Towards Goal 
Goal: *Fluid volume balance Outcome: Progressing Towards Goal 
Goal: *Labs within defined limits Outcome: Progressing Towards Goal 
  
Problem: Patient Education: Go to Patient Education Activity Goal: Patient/Family Education Outcome: Progressing Towards Goal 
  
Problem: Pressure Injury - Risk of 
Goal: *Prevention of pressure injury Description: Document Calderon Scale and appropriate interventions in the flowsheet. Outcome: Progressing Towards Goal 
  
Problem: Patient Education: Go to Patient Education Activity Goal: Patient/Family Education Outcome: Progressing Towards Goal 
  
Problem: Chronic Renal Failure Goal: *Fluid and electrolytes stabilized Outcome: Progressing Towards Goal 
  
Problem: Patient Education: Go to Patient Education Activity Goal: Patient/Family Education Outcome: Progressing Towards Goal 
  
Problem: Patient Education: Go to Patient Education Activity Goal: Patient/Family Education Outcome: Progressing Towards Goal 
  
Problem: Patient Education: Go to Patient Education Activity Goal: Patient/Family Education Outcome: Progressing Towards Goal 
  
Problem: Comfort Deficit Goal: Reduce/control pain Description: Patient will report that pain has been reduced or controlled through verbal and nonverbal means and that measures to promote comfort are effective. Outcome: Progressing Towards Goal 
  
Problem: Pain Goal: Verbalize satisfaction of level of comfort and symptom control Outcome: Progressing Towards Goal 
  
 Problem: Anticipatory Grief Goal: Explore reactions to and verbalize acceptance of impending loss Description: Patient/family/caregiver will explore reactions to and verbalize acceptance of impending loss. Outcome: Progressing Towards Goal 
  
Problem: Coping and Emotional Distress Goal: Demonstrate acceptance of terminal illness and understanding of disease progression Description: Patient/family/caregiver will demonstrate acceptance of terminal disease and understanding of disease progression while employing appropriate coping mechanisms. Outcome: Progressing Towards Goal 
  
Problem: End of Life Process Goal: Demonstrate understanding of end of life processes Description: Patient/caregiver will understand end of life processes.  
Outcome: Progressing Towards Goal

## 2020-08-24 NOTE — PROGRESS NOTES
Minimally responsive. Appears comfortable. Transition to comfort and and hospice meeting later today per nursing. Will see prn.

## 2020-08-24 NOTE — PROGRESS NOTES
2330: Bedside and Verbal report given to Enzo Domingo RN (oncoming nurse) by ARIANA Hamilton (offgoing nurse). Report included the following information SBAR, Kardex, Procedure Summary, Intake/Output, MAR, Recent Results, Med Rec Status and Cardiac Rhythm NSR.  
 
0000: Resumed pt care, family refusing turns requesting to only reposition pt/give PRN morphine when requested. Pt on comfort care, son at bedside. Pt seems comfortable/no distress/pain. 0600: Orders for scopolamine patch and Ativan 1mg PRN Q15  
 
0645: Pt trembling/eyes wide open, PRN ativan given 0730: Bedside and Verbal shift change report given to Jefry Pisano RN (oncoming nurse) by Enzo Domingo RN (offgoing nurse). Report included the following information SBAR, Kardex, ED Summary, Procedure Summary, Intake/Output, MAR, Recent Results, Med Rec Status and Cardiac Rhythm NSR.

## 2020-08-24 NOTE — PROGRESS NOTES
Bedside shift change report given to ArNunn Gilberto (oncoming nurse) by Zachary Gallego RN (offgoing nurse). Report included the following information SBAR, Kardex, ED Summary, Intake/Output, MAR and Recent Results. 26: Called son to give update that patients HR decreased to 45s. Stated he would be in shortly. 1120: Patient pronounced by Dr. Aidan Noriega. Family on the way. 1145: Pts sons at bedside. 1230: Unable to get ring off of patients finger. Son aware and asked for  home to get it off. Gave son supervisors phone number to call once  home was decided. 1300: Post mortem care performed and patient taken to [facility].

## 2020-08-24 NOTE — PROGRESS NOTES
[facility] 
 
ICU TEAM Progress Note Name: Alcides Contreras : 1935 MRN: [id number] Date: 2020 I Subjective:  
Progress Note: 2020 Reason for ICU Admission: Cardiogenic shock, encephalopathy, status post brief cardiac arrest 
 
Interval history: 
53-year-old female with extensive medical history including diffuse coronary artery disease with systolic heart failure with ejection fraction of 25%, metastatic breast cancer, history of colon cancer and a plethora of other medical problem readmitted to the hospital on  after being discharged for less than 24-hour.  Came in with increased work of breathing and some hypoxia with hypotension.  Found to have worsening pleural effusion and pericardial effusion and sustained a brief cardiac arrest requiring a brief period of intubation on the evening of the .  Now in the ICU profoundly debilitated receiving antibiotic diuretics and now on low-dose norepinephrine milrinone and was started on amiodarone drip as well.  Usually on nasal cannula but requiring BiPAP at times.  Despite multiple discussion with the family they changed her CODE STATUS and goals of care to full code. Overnight Events:  
No major o/n events reported. Pt transitioned to Comfort Measures Only yesterday per family. Active Problem List:  
 
Problem List  Date Reviewed: 2020 Codes Class Heart failure ([facility] ICD-10-CM: I50.9 ICD-9-CM: 428.9 CHF (congestive heart failure) (HCC) ICD-10-CM: I50.9 ICD-9-CM: 428.0 Bacteremia due to Gram-negative bacteria ICD-10-CM: R78.81 ICD-9-CM: 790.7, 041.85   
   
 UTI (urinary tract infection) ICD-10-CM: N39.0 ICD-9-CM: 599.0 Septic shock (HCC) ICD-10-CM: A41.9, R65.21 ICD-9-CM: 038.9, 785.52, 995.92 Dizziness ICD-10-CM: G55 ICD-9-CM: 780.4  Type 2 diabetes with nephropathy (HCC) ICD-10-CM: E11.21 
ICD-9-CM: 250.40, 583.81   
   
 CAD (coronary artery disease) ICD-10-CM: I25.10 ICD-9-CM: 414.00 Metastatic breast cancer ([facility] ICD-10-CM: P05.773 ICD-9-CM: 174.9 CKD (chronic kidney disease) stage 3, GFR 30-59 ml/min ([facility]) ICD-10-CM: N18.3 ICD-9-CM: 198. 3 Vitamin D deficiency ICD-10-CM: E55.9 ICD-9-CM: 268.9 Asymptomatic hyperuricemia ICD-10-CM: E79.0 ICD-9-CM: 790.6 Statin intolerance ICD-10-CM: Z78.9 ICD-9-CM: 995.27 Long-term use of immunosuppressant medication ICD-10-CM: Z79.899 ICD-9-CM: V58.69 Seropositive rheumatoid arthritis of multiple sites [facility]) ICD-10-CM: M05.79 ICD-9-CM: 714.0 Primary osteoarthritis of both knees ICD-10-CM: M17.0 ICD-9-CM: 715.16 Weakness due to cerebrovascular accident ICD-10-CM: ZNU2336 ICD-9-CM: ECW7716 PAD (peripheral artery disease) ([facility]) ICD-10-CM: I73.9 ICD-9-CM: 443.9 Carotid bruit ICD-10-CM: R09.89 ICD-9-CM: 974. 9 Hyperlipidemia ICD-10-CM: E78.5 ICD-9-CM: 272.4 Type 2 diabetes mellitus with neurologic complication, with long-term current use of insulin ([facility]) ICD-10-CM: E11.49, Z79.4 ICD-9-CM: 250.60, V58.67 Colon cancer [facility]) ICD-10-CM: C18.9 ICD-9-CM: 153.9 Age-related osteoporosis without current pathological fracture ICD-10-CM: M81.0 ICD-9-CM: 733.01   
   
 HTN, goal below 140/90 ICD-10-CM: I10 
ICD-9-CM: 401.9 Anemia ICD-10-CM: D64.9 ICD-9-CM: 285.9 Past Medical History:  
 
 has a past medical history of Acute on chronic systolic HF (heart failure) ([facility] (5/19/2018), Age-related osteoporosis without current pathological fracture (9/2/2009), Anemia (9/2/2009), Asymptomatic hyperuricemia (2/16/2017), Breast cancer ([facility] CAD (coronary artery disease) (6/21/2018), Carotid bruit (8/31/2011), CHF (congestive heart failure) ([facility] CKD (chronic kidney disease) stage 3, GFR 30-59 ml/min ([facility] (10/25/2017), Colon cancer ([facility] (9/2/2009), Colon cancer ([facility] (9/2/2009), COVID-19, DM (diabetes mellitus) ([facility] (9/2/2009), GERD (gastroesophageal reflux disease), H/O: CVA (9/2/2009), Heart attack ([facility] HTN, goal below 140/90 (9/2/2009), Hypothyroid (9/2/2009), Ill-defined condition, Long-term use of immunosuppressant medication (11/21/2016), Metastatic breast cancer ([facility] (6/1/2018), Microalbuminuria (9/2/2009), Osteoporosis (9/2/2009), Other and unspecified hyperlipidemia (1/27/2010), PAD (peripheral artery disease) ([facility] (9/7/2011), Primary osteoarthritis of both knees (9/28/2016), Rheumatoid arthritis involving ankle ([facility] (9/28/2016), Rheumatoid arthritis(714.0) (9/2/2009), Seropositive rheumatoid arthritis of multiple sites ([facility] (9/28/2016), Statin intolerance (12/21/2016), Type 2 diabetes mellitus with neurologic complication, with long-term current use of insulin ([facility] (9/2/2009), Type 2 diabetes with nephropathy ([facility] (8/20/2018), Unspecified essential hypertension (9/2/2009), Vitamin D deficiency (6/16/2017), and Weakness due to cerebrovascular accident. Past Surgical History:  
 
 has a past surgical history that includes hx colectomy; hx hysterectomy; and hx other surgical. 
 
Home Medications:  
 
Prior to Admission medications Medication Sig Start Date End Date Taking? Authorizing Provider  
furosemide (LASIX) 40 mg tablet Take 1 Tab by mouth two (2) times a day. 8/16/20  Yes Brigid Kibry, DO  
lisinopriL (PRINIVIL, ZESTRIL) 5 mg tablet Take 1 Tab by mouth daily. 8/17/20  Yes Brigid Kirby, DO  
carvediloL (Coreg) 6.25 mg tablet Take 6.25 mg by mouth two (2) times daily (with meals). Yes Provider, Historical  
clopidogreL (PLAVIX) 75 mg tab TAKE ONE TABLET BY MOUTH DAILY 6/2/20  Yes Guerline Hernandez MD  
cholecalciferol (Vitamin D3) 25 mcg (1,000 unit) cap Take 1,000 Units by mouth daily.    Yes Provider, Historical  
 glucosam/wesley-msm1/C/bassam/bosw (OSTEO BI-FLEX TRIPLE STRENGTH PO) Take 2 Tabs by mouth daily. Yes Luís Escalante MD  
evolocumab (Repatha SureClick) pen injection 099 mg by SubCUTAneous route every fourteen (14) days. Yes Provider, Historical  
insulin aspart U-100 (NOVOLOG FLEXPEN U-100 INSULIN) 100 unit/mL (3 mL) inpn by SubCUTAneous route Before breakfast, lunch, dinner and at bedtime. 2-3 units depending on blood sugar before meals. (Note: son states very sensitive.)   Yes Provider, Historical  
multivitamin (ONE A DAY) tablet Take 1 Tab by mouth daily. Yes Provider, Historical  
insulin degludec (TRESIBA FLEXTOUCH U-100) 100 unit/mL (3 mL) inpn 14 Units by SubCUTAneous route daily. Yes Provider, Historical  
nitroglycerin (NITROSTAT) 0.4 mg SL tablet 1 Tab by SubLINGual route as needed for Chest Pain. Up to 3 doses. Patient taking differently: 1 Tab by SubLINGual route every five (5) minutes as needed for Chest Pain. Up to 3 doses. 18  Yes Makenna Guallpa MD  
levothyroxine (SYNTHROID) 88 mcg tablet Take 88 mcg by mouth Daily (before breakfast). Yes Provider, Historical  
aspirin delayed-release 81 mg tablet Take 81 mg by mouth daily. Yes Provider, Historical  
OMEGA-3 FATTY ACIDS/FISH OIL (OMEGA 3 FISH OIL PO) Take  by mouth daily. Yes Provider, Historical  
 
 
Allergies/Social/Family History: Allergies Allergen Reactions  Statins-Hmg-Coa Reductase Inhibitors Other (comments) Intolerant to statins  Sulfa (Sulfonamide Antibiotics) Other (comments)  
  syncope Social History Tobacco Use  Smoking status: Never Smoker  Smokeless tobacco: Never Used Substance Use Topics  Alcohol use: No  
  
Family History Problem Relation Age of Onset  Diabetes Mother  Stroke Mother  Diabetes Sister  Other Sister   
     fell and hit her head -  of this  Diabetes Brother  Cancer Father   
     stomach  Diabetes Sister Review of Systems:  
 
Not able to obtain due to her medical condition Objective:  
Vital Signs: 
Visit Vitals BP (!) 81/48 Pulse 78 Temp 97.3 °F (36.3 °C) Resp 19 Ht 5' 1\" (1.549 m) Wt 74 kg (163 lb 2.3 oz) SpO2 100% BMI 30.83 kg/m² O2 Flow Rate (L/min): 3 l/min O2 Device: Nasal cannula Temp (24hrs), Av.3 °F (36.3 °C), Min:96.8 °F (36 °C), Max:98 °F (36.7 °C) CVP (mmHg): 21 mmHg (20 0100) Intake/Output:  
 
Intake/Output Summary (Last 24 hours) at 2020 1050 Last data filed at 2020 0700 Gross per 24 hour Intake 601.11 ml Output 60 ml Net 541.11 ml Physical Exam: 
 
General:   Obtunded poorly responsive, occasional agonal breathing Eyes:  Sclera anicteric. Pupils equally round and reactive to light. Mouth/Throat: Mucous membranes normal, oral pharynx clear Neck: Supple Lungs:    Good air entry bilaterally, fine crepitation bilaterally no wheezing CV:   Irregular rate and rhythm,no murmur, click, rub or gallop Abdomen:   Soft, non-tender. bowel sounds normal. non-distended Extremities: No cyanosis or edema Skin: Open wound under L breast (erythematous, ulcerated) Lymph nodes: Cervical and supraclavicular normal  
Musculoskeletal:  +1 edema Lines/Devices:  Intact, no erythema, drainage or tenderness Neuro/Psych:  Obtunded and not responsive, open eyes to painful stimuli, moves right arm occasionally, not oriented  
  
 
 
LABS AND  DATA: Personally reviewed Recent Labs  
  20 WBC 13.5* 12.2* HGB 7.2* 8.0*  
HCT 23.1* 25.8*  
 173 Recent Labs  
  20 * 129*  
K 4.9 4.5  
CL 98 99 CO2 18* 20* BUN 73* 68* CREA 2.59* 2.33* * 310* CA 6.9* 7.5* MG  --  2.2 No results for input(s): AP, TBIL, TP, ALB, GLOB, AML, LPSE in the last 72 hours. No lab exists for component: SGOT, GPT, AMYP No results for input(s): INR, PTP, APTT, INREXT, INREXT in the last 72 hours. Recent Labs  
  08/21/20 
1137 PHI 7.25* PCO2I 36.4 PO2I 35* FIO2I 0.30 No results for input(s): CPK, CKMB, TROIQ, BNPP in the last 72 hours. Hemodynamics:  
PAP:   CO:    
Wedge:   CI:    
CVP:  CVP (mmHg): 21 mmHg (08/22/20 0100) SVR:    
  PVR:    
 
Ventilator Settings: 
Mode Rate Tidal Volume Pressure FiO2 PEEP  
         30 % Peak airway pressure:     
Minute ventilation: 9.1 l/min MEDS: Reviewed Chest X-Ray: CXR Results  (Last 48 hours) None Assessment and Plan: 1. PEA Arrest (17 Aug 2020 - CPR - ROSC) 
a. CAD, diffuse coronary artery disease not amenable to intervention 
b. Cardiogenic shock 
c. Pericardial Effusion: Chronic without tamponade 
d. Acute on Chronic CHF, worsening ejection fraction now 20 to 25% 
i. Bilateral Pleural Effusions 
e. Anemia 2. Metastatic Breast Cancer (radiation treatment) a. Open Wound Under Left Breast (prior to admission) 3. Septic Shock (from UTI prior to admission) a. UTI (multiple Klebsiella UTIs in past) 4. DM2: Blood sugar not at goal, adjusting insulin doses 5. Colon Cancer 6. Acute kidney injury on top of chronic kidney disease stage III-IV 7. Encephalopathy [ICU delirium/toxic/metabolic] Patient transitioned to Comfort Measures Only yesterday per family wishes. Will request hospice c/s. May qualify for inpt hospice. DNR/DNI/CMO 
 
 
DISPOSITION Transfer to non-ICU bed if needed or per Hospice. CRITICAL CARE CONSULTANT NOTE I had a face to face encounter with the patient, reviewed and interpreted patient data including clinical events, labs, images, vital signs, I/O's, and examined patient.   I have discussed the case and the plan and management of the patient's care with the consulting services, the bedside nurses and the respiratory therapist.   
 
NOTE OF PERSONAL INVOLVEMENT IN CARE  
 This patient has a high probability of imminent, clinically significant deterioration, which requires the highest level of preparedness to intervene urgently. I participated in the decision-making and personally managed or directed the management of the following life and organ supporting interventions that required my frequent assessment to treat or prevent imminent deterioration. Fausto Eagle DO Staff Intensivist/Pulmonologist 
[facility] 8/24/2020

## 2020-08-24 NOTE — PROGRESS NOTES
Notified by staff of the death of Ms Kris Mclean in [address] [place] [address]. No family was present when  arrived but nurse stated that they were on their way to the hospital.  Had moment of silence at bedside. : Rev. Dora Eddy. Jeris Schilder; [facility], to contact [phone] Yaya Boone call: [phone]

## 2020-08-24 NOTE — INTERDISCIPLINARY ROUNDS
Multidisciplinary rounds were held August 24, 2020. Today's plan/goal includes (but not limited to): Comfort care

## 2020-08-24 NOTE — PROGRESS NOTES
Chart reviewed in preparation for physical therapy session. Noted patient is now on comfort care. Physical therapy will sign off.

## 2020-08-24 NOTE — PROGRESS NOTES
Transitions of Care Plan: 
 RUR: 48% Switched to comfort care over the weekend CM will monitor for IP hospice consult [address] with CCU RN - requested hospice consult for patient to determine GIP vs. Home Hospice criteria. Patient BP 80/40.  
 
Angie Ayala, MPH

## 2020-08-26 RX ORDER — FUROSEMIDE 40 MG/1
TABLET ORAL
Qty: 30 TAB | Refills: 0 | OUTPATIENT
Start: 2020-08-26

## 2021-04-24 NOTE — PROGRESS NOTES
Day #4 of Levaquin/Zosyn  Indication:  Possible HCAP  Abx regimen:   - Levaquin 500 mg IV Q 48 hr  - Zosyn 4.5 gm IV Q 12 hr  Recent Labs      18   0457  18   0557   WBC  5.9  7.0   CREA  1.41*  1.50*   BUN  51*  62*     Est CrCl: ~20-30 ml/min; UO: >1 ml/kg/hr  Temp (24hrs), Av.8 °F (36.6 °C), Min:97.5 °F (36.4 °C), Max:98 °F (36.7 °C)    Cultures:    Blood: CoNS in / bottles, pending    Plan: Given the improvement in the patient's Scr, the dose of Levaquin has been adjusted to 750 mg IV Q 48 hr and Zosyn to 4.5 gm IV Q 8 hr per [facility] P&T Committee Protocol with respect to renal function. Pharmacy will continue to monitor patient daily and will make dosage adjustments based upon changing renal function. No

## 2022-12-14 NOTE — TELEPHONE ENCOUNTER
Pt.'s son is calling checking the status of his massage that he left early today. He is requesting to speak with the nurse or the doctor today before closing in regards to get some medical advice about what to do with his mother's runny nose.      [phone] Validate Note Data When Using Inventory: Yes

## 2022-12-15 NOTE — PROGRESS NOTES
0730 Bedside shift change report given to Kourtney Ocampo (oncoming nurse) by Anna Anaya (offgoing nurse). Report included the following information SBAR, Intake/Output, MAR, Recent Results and Cardiac Rhythm NSR.     1050 PTT drawn and sent to lab    1315 Repeat PTT drawn and sent to lab    1330 TRANSFER - OUT REPORT:    Verbal report given to Dalia(name) on Darrick England  being transferred to [facility]SU(unit) for routine progression of care       Report consisted of patients Situation, Background, Assessment and   Recommendations(SBAR). Information from the following report(s) SBAR, Intake/Output, MAR, Recent Results and Cardiac Rhythm NSR was reviewed with the receiving nurse. Lines:   Peripheral IV 06/01/18 Right Antecubital (Active)   Site Assessment Clean, dry, & intact 6/2/2018  8:00 AM   Phlebitis Assessment 0 6/2/2018  8:00 AM   Infiltration Assessment 0 6/2/2018  8:00 AM   Dressing Status Clean, dry, & intact 6/2/2018  8:00 AM   Dressing Type Transparent 6/2/2018  8:00 AM   Hub Color/Line Status Green 6/2/2018  8:00 AM   Action Taken Open ports on tubing capped 6/2/2018  8:00 AM   Alcohol Cap Used Yes 6/2/2018  8:00 AM       Peripheral IV 06/01/18 Right Hand (Active)   Site Assessment Clean, dry, & intact 6/2/2018  8:00 AM   Phlebitis Assessment 0 6/2/2018  8:00 AM   Infiltration Assessment 0 6/2/2018  8:00 AM   Dressing Status Clean, dry, & intact 6/2/2018  8:00 AM   Dressing Type Transparent 6/2/2018  8:00 AM   Hub Color/Line Status Pink 6/2/2018  8:00 AM   Action Taken Open ports on tubing capped 6/2/2018  8:00 AM   Alcohol Cap Used Yes 6/2/2018  8:00 AM        Opportunity for questions and clarification was provided.       Patient transported with:   Monitor  Registered Nurse  Tech          \ 1:1 for carryover of swallow strategies/frequent cues/help required

## 2023-08-01 NOTE — DIABETES MGMT
Progress Note     Chart reviewed for elevated blood glucose ( > 180 mg/dL x 2 in the past 24 hours). Recommendations/ Comments: Lantus was held per MD order 5/8/2018 2' NPO status for procedure. If appropriate, please consider decreasing Lantus from 10 units to 6 units (~0.1 units/kg/day)*    *weight based dosage calculation (0.1-0.2 units/ kg/ day) based on Denis Yan., [place], Boyd Crawley., PATRICK Arteaga., Dakota Vela., ... & Pete Townsend. (2017). Consensus statement by the American Association of Clinical Endocrinologists and [address] [place] [address] Endocrinology on the comprehensive type 2 diabetes management algorithm2017 executive summary. Endocrine Practice, 232), [id number]. Morning glucose: 124 mg/dL (per am POCT Glucose). Required 5 units of correction in the last 24 hours. Inpatient medications for glucose management:  1. Correction Scale: Lispro (Humalog) Normal Sensitivity scale to cover for glucose > 139 mg/dL before meals and for glucose >199 at bedtime      2. Lantus 10 units daily    POC Glucose last 24hrs:   Lab Results   Component Value Date/Time     (H) 05/09/2018 05:30 AM    GLUCPOC 124 (H) 05/09/2018 06:36 AM    GLUCPOC 156 (H) 05/08/2018 09:55 PM    GLUCPOC 182 (H) 05/08/2018 04:28 PM        Estimated Creatinine Clearance: 44.8 mL/min (based on Cr of 0.85). Diet order:   Active Orders   Diet    DIET GI LITE (POST SURGICAL)        PO intake: Patient Vitals for the past 72 hrs:   % Diet Eaten   05/08/18 1426 50 %   05/08/18 1022 75 %   05/07/18 1730 75 %   05/06/18 2306 0 %       History of Diabetes:   Lyndsay Mead is a 82 y.o. female with a past medical history significant for DM per  Shelly Silveira MD's H&P dated 4/16/2018.      Prior to admission medications for glucose management: per past medical records  -Amaryl 1mg every morning  -Januvia 50mg daily  -Metformin 1000mg twice a day with meals     A1C:   Lab Results   Component Value Date/Time    Hemoglobin A1c 7.0 (H) 04/18/2018 08:14 AM    Hemoglobin A1c 6.9 (H) 11/30/2011 08:30 AM       Reference range*:  Increased risk for diabetes: 5.7 - 6.4%  Diabetes: >6.4%  Glycemic control for adults with diabetes: <7.0 %    *JEANNINE RICHEY (2014). Diagnosis and classification of diabetes mellitus. Diabetes care, 37, S81. Thank you. Cary Goldsmith MPH, RN, BSN, [address]   [phone]    -A target glucose range of 140180 mg/dL (7.810.0 mmol/L) is recommended for the majority of critically ill patients and noncritically ill patients. -More stringent goals, such as 110140 mg/dL (6.17.8 mmol/L), may be appropriate for selected patients, if this can be achieved without significant hypoglycemia. *    *Diabetes Care in the Hospital: Johanna Sellers in Diabetes2018  American Diabetes Association  Diabetes Care Jan 2018, 41 (Supplement 1) P892-K834; DOI: 10.2337/km79-N215 Clofazimine Counseling:  I discussed with the patient the risks of clofazimine including but not limited to skin and eye pigmentation, liver damage, nausea/vomiting, gastrointestinal bleeding and allergy.

## 2024-05-17 NOTE — PROGRESS NOTES
2124 TRANSFER - IN REPORT:    Verbal report received from [address] RN(name) on Tamie Slot  being received from April, RN(unit) for change in patient condition(Hypotension)      Report consisted of patients Situation, Background, Assessment and   Recommendations(SBAR). Information from the following report(s) SBAR, Kardex, ED Summary, Intake/Output, MAR, Accordion, Recent Results, Cardiac Rhythm NSR and Alarm Parameters  was reviewed with the receiving nurse. Opportunity for questions and clarification was provided. Assessment completed upon patients arrival to unit and care assumed. 2150 Pt arrived on unit. Pt A&OX4 BP 85/42 MAP 56. Hospitalist paged. 2152 Order received from hospitalist Dr. Paul Spencer, for central line insertions, castro insertion, and levophed for titration. Dr. Paul Spencer also instructed me to call cardiologist practice on consult for advisement. 2155 Spoke with on-call cardiologist for consulting practice was advised to start filomena to keep MAP >65.    2200 Anesthisology paged. 2215 Dr. Ryan Eldridge called back states will be up for central line insertion after another pt in ED.    0015 Dr. Ryan Eldridge at bedside. Central line inserted. Chest X ray ordered for confirmation. 0030 Radiologist technologist at bedside for Chest X-ray. Levophed started at [address] 0500 Spoke with Dr. Paul Spencer, pt urine output approx 50ml over 3 hours. Given orders for 500ml NS bolus over 2hours. 0730 Bedside and Verbal shift change report given to Cindy Ellis RN (oncoming nurse) by [address], RN (offgoing nurse). Report included the following information SBAR, Kardex, ED Summary, Procedure Summary, Intake/Output, MAR, Accordion, Recent Results, Cardiac Rhythm NSR and Alarm Parameters . NGT was found to be in the duodenum, pulled NGT back tosecured at 58cm  Chest xray pending post adjustment

## 2024-09-05 NOTE — PROGRESS NOTES
Patient admitted to ARU on 8/23 with dx of s/p TKR (elective)  A/Ox4. Transfers with walker x1. Mobility restrictions: WBAT. On Regular diet, tolerating well. Medications taken whole with thins. On ASA, GRAHAM hose for DVT prophylaxis.  Skin: prineo dressing surgical incision left knee. (Ordered to be removed in a.m before d/c to home 9/5) Oxygen: RA. LDA: PIV RFA, to be removed prior to d/c. Has been continent of bowel and of bladder, uses urinal. LBM 9/4today. Chair/bed alarms in use and call light within reach.  PT on routine tylenol, denies need for prn. Pain under control. Has personal ice machine.  by family for d/c by 1030am. Had an 11am f/u appt with Dr. Deng at 11am but patient said this was scheduled way before his surgery back in May and at that time the premise was he was going straight home from surgery and not going to ARU for rehab. Contact Maddison in a.m. to confirm is she is coming in the a.m. to see patient before d/c.   Pulmonary, Critical Care, and Sleep Medicine~Progress Note    Name: Evelyne Ott MRN: [id number]   : 1935 Hospital: [facility]   Date: 2018 11:37 AM Admission: 2018      Impression Plan   1. Metastatic breast cancer- new dx- left breast mass on MRI with diffuse skeletal mets. Follows with Dr. Joyce Miranda  2. Acute hypercarbic/hypoxic resp failure-pCO2 ok range  3. Altered mental status  4. NSTEMI - ECHO EF 40%  5. JEROD on CKD  6. GERD  7. E Coli UTI- sens pip/tazo 1. O2 titration above 90%,    2. PT/OT  3. Noted SLP, will move forward with MBS to investigate silent aspiration   4. Hemodynamically stable  5. diuresis for CHF  6. BP control   7. Correct electrolytes as needed   8. ABX per ID  9. Patient followed by multiple specialties   10. NIV at night and PRN during the day for now  11. Strong pulmonary toilet, including chest PT  12. Discussed with RN/Attending  13. NPO, bronchoscopy per attending. Daily Progression:    Looked comfortable this morning. Noted events overnight. Chest film with left opacification, received lasix last night     I have reviewed the labs and previous days notes. Review of systems not obtained due to patient factors. OBJECTIVE:     Vital Signs:       Visit Vitals    /52    Pulse 89    Temp 97.9 °F (36.6 °C)    Resp 23    Ht 5' 1\" (1.549 m)    Wt 59.9 kg (132 lb 0.9 oz)    SpO2 99%    BMI 24.95 kg/m2      Temp (24hrs), Av.2 °F (36.8 °C), Min:97.9 °F (36.6 °C), Max:98.7 °F (37.1 °C)     Intake/Output:     Last shift: 701 - 1900  In: -   Out: 200 [Urine:200]    Last 3 shifts: 1901 -  07  In: 1842.5 [P.O.:150;  I.V.:1692.5]  Out: 2115 [Urine:2115]          Intake/Output Summary (Last 24 hours) at 18 0854  Last data filed at 18 0800   Gross per 24 hour   Intake              895 ml   Output             1920 ml   Net            -1025 ml Physical Exam:                                        Exam Findings Other   General: No resp distress noted, appears stated age    [de-identified]:  No ulcers, JVD not elevated, no cervical LAD    Chest: No pectus deformity, normal chest rise b/l    HEART:  RRR, no murmurs/rubs/gallops    Lungs:  Clear     ABD: Soft/NT, non rigid mildly distended    EXT: No cyanosis/clubbing/edema, normal peripheral pulses    Skin: No rashes or ulcers, no mottling    Neuro: Alert and answers simple questions         Medications:  Current Facility-Administered Medications   Medication Dose Route Frequency    aspirin chewable tablet 81 mg  81 mg Oral DAILY    carvedilol (COREG) tablet 3.125 mg  3.125 mg Oral BID WITH MEALS    spironolactone (ALDACTONE) tablet 25 mg  25 mg Oral DAILY    acetaminophen (TYLENOL) suppository 650 mg  650 mg Rectal Q4H PRN    hydrALAZINE (APRESOLINE) tablet 25 mg  25 mg Oral TID    levoFLOXacin (LEVAQUIN) 750 mg in D5W IVPB  750 mg IntraVENous Q48H    sodium chloride (NS) flush 5-10 mL  5-10 mL IntraVENous Q8H    sodium chloride (NS) flush 5-10 mL  5-10 mL IntraVENous PRN    albuterol-ipratropium (DUO-NEB) 2.5 MG-0.5 MG/3 ML  3 mL Nebulization Q4H RT    budesonide (PULMICORT) 500 mcg/2 ml nebulizer suspension  500 mcg Nebulization BID RT    acetylcysteine (MUCOMYST) 200 mg/mL (20 %) solution 200 mg  200 mg Nebulization QID RT    hydrALAZINE (APRESOLINE) 20 mg/mL injection 10 mg  10 mg IntraVENous Q6H PRN    fluconazole in 0.9% NaCl 100 mg IVPB  100 mg IntraVENous Q24H    0.9% sodium chloride infusion 250 mL  250 mL IntraVENous PRN    sodium chloride (NS) flush 20 mL  20 mL InterCATHeter PRN    sodium chloride (NS) flush 10 mL  10 mL InterCATHeter Q24H    sodium chloride (NS) flush 10 mL  10 mL InterCATHeter PRN    sodium chloride (NS) flush 10 mL  10 mL InterCATHeter Q8H    alteplase (CATHFLO) 1 mg in sterile water (preservative free) 1 mL injection  1 mg InterCATHeter PRN    bacitracin 500 unit/gram packet 1 Packet  1 Packet Topical PRN    meropenem (MERREM) 500 mg in 0.9% sodium chloride (MBP/ADV) 50 mL  0.5 g IntraVENous Q12H    sodium chloride (NS) flush 10-30 mL  10-30 mL InterCATHeter PRN    sodium chloride (NS) flush 10 mL  10 mL InterCATHeter Q24H    sodium chloride (NS) flush 10 mL  10 mL InterCATHeter PRN    sodium chloride (NS) flush 10-40 mL  10-40 mL InterCATHeter Q8H    sodium chloride (NS) flush 20 mL  20 mL InterCATHeter Q24H    anastrozole (ARIMIDEX) tablet 1 mg  1 mg Oral DAILY    0.9% sodium chloride infusion 250 mL  250 mL IntraVENous PRN    epoetin celio (EPOGEN;PROCRIT) injection 10,000 Units  10,000 Units SubCUTAneous Q MON, WED & FRI    albuterol-ipratropium (DUO-NEB) 2.5 MG-0.5 MG/3 ML  3 mL Nebulization Q6H PRN    glucose chewable tablet 16 g  4 Tab Oral PRN    dextrose (D50W) injection syrg 12.5-25 g  12.5-25 g IntraVENous PRN    glucagon (GLUCAGEN) injection 1 mg  1 mg IntraMUSCular PRN    insulin lispro (HUMALOG) injection   SubCUTAneous AC&HS    prochlorperazine (COMPAZINE) with saline injection 5 mg  5 mg IntraVENous Q8H PRN    ascorbic acid (vitamin C) (VITAMIN C) tablet 500 mg  500 mg Oral DAILY    cholecalciferol (VITAMIN D3) tablet 1,000 Units  1,000 Units Oral DAILY    folic acid (FOLVITE) tablet 1 mg  1 mg Oral DAILY    magnesium oxide (MAG-OX) tablet 400 mg  400 mg Oral DAILY    therapeutic multivitamin (THERAGRAN) tablet 1 Tab  1 Tab Oral DAILY    fish oil-omega-3 fatty acids 340-1,000 mg capsule 1 Cap  1 Cap Oral DAILY    sodium chloride (NS) flush 5-10 mL  5-10 mL IntraVENous Q8H    sodium chloride (NS) flush 5-10 mL  5-10 mL IntraVENous PRN    ondansetron (ZOFRAN) injection 4 mg  4 mg IntraVENous Q4H PRN    levothyroxine (SYNTHROID) tablet 88 mcg  88 mcg Oral ACB       Labs:  ABG No results for input(s): PHI, PCO2I, PO2I, HCO3I, SO2I, FIO2I in the last 72 hours.      CBC Recent Labs      05/01/18   0407  04/30/18   1046   WBC  21.1* 19.3*   HGB  10.1*  9.3*   HCT  32.0*  30.4*   PLT  187  194   MCV  92.5  94.4   MCH  29.2  63.5        Metabolic  Panel Recent Labs      05/01/18   0407  04/30/18   1046  04/29/18   0904  04/28/18   1432   NA  138  139  146*  150*   K  4.2  4.6  3.3*  3.8   CL  100  102  105  108   CO2  31  31  34*  36*   GLU  225*  237*  209*  168*   BUN  34*  39*  43*  51*   CREA  1.08*  1.21*  1.39*  1.44*   CA  9.1  8.7  8.5  9.5   MG   --   1.8   --    --    PHOS   --   2.6  2.6  1.5*   ALB   --   2.3*  2.4*  2.6*        Pertinent Labs                JEAN PAUL Anthony  5/1/2018

## 2024-12-17 NOTE — PROGRESS NOTES
Care Management Interventions  PCP Verified by CM: Yes (Dr Kelsey Morales )  Mode of Transport at Discharge: Other (see comment) (private vehicle)  Transition of Care Consult (CM Consult): SNF  Partner SNF: No  Reason Why Partner SNF Not Chosen: Friend/family recommendation  MyChart Signup: No  Discharge Durable Medical Equipment: No  Physical Therapy Consult: Yes  Occupational Therapy Consult: Yes  Speech Therapy Consult: No  Current Support Network: Own Home, Has Personal Caregivers  Confirm Follow Up Transport: Family  Plan discussed with Pt/Family/Caregiver: Yes  Freedom of Choice Offered: Yes  [address] Provided?: No  Discharge Location  Discharge Placement: Skilled nursing facility ([facility])    CM received notice that insurance has given authorization for [facility]. [facility] has a bed and can accept pt today. Son is in agreement with plan and will transport pt around 4:30pm.  CM faxed AVS and Bi-Pap settings to [facility]. Envelope containing MARs, Kardex, AVS, and Bi-Pap settings will be sent with pt. Nurse will call report.   JEFF Cody, ANUSHKA [Dear  ___] : Dear ~OVIDIO, [Consult Letter:] : I had the pleasure of evaluating your patient, [unfilled]. [Please see my note below.] : Please see my note below. [Consult Closing:] : Thank you very much for allowing me to participate in the care of this patient.  If you have any questions, please do not hesitate to contact me. [Sincerely,] : Sincerely, [FreeTextEntry2] : DEBBIE Nava [FreeTextEntry3] : Kassandra Macedo MD, FAASM  of Medicine Associate , Fellowship in Pulmonary and Critical Care Medicine Division of Pulmonary, Critical Care & Sleep Medicine [employer] [facility] [employer] [facility]

## 2025-06-22 NOTE — PERIOP NOTES
Pt used her personal walker to ambulate to and from bathroom    TRANSFER - IN REPORT: 
 
Verbal report received from [facility] RN(name) on [address]  being received from 6E(unit) for ordered procedure Report consisted of patients Situation, Background, Assessment and  
Recommendations(SBAR). Information from the following report(s) SBAR, OR Summary, Procedure Summary and Intake/Output was reviewed with the receiving nurse. Opportunity for questions and clarification was provided. Assessment completed upon patients arrival to unit and care assumed.

## (undated) DEVICE — (D)PREP SKN CHLRAPRP APPL 26ML -- CONVERT TO ITEM 371833

## (undated) DEVICE — INFECTION CONTROL KIT SYS

## (undated) DEVICE — DERMABOND SKIN ADH 0.7ML -- DERMABOND ADVANCED 12/BX

## (undated) DEVICE — STERILE POLYISOPRENE POWDER-FREE SURGICAL GLOVES WITH EMOLLIENT COATING: Brand: PROTEXIS

## (undated) DEVICE — SUTURE MCRYL SZ 4-0 L27IN ABSRB UD L19MM PS-2 1/2 CIR PRIM Y426H

## (undated) DEVICE — GARMENT,MEDLINE,DVT,INT,CALF,MED, GEN2: Brand: MEDLINE

## (undated) DEVICE — YANKAUER,TAPERED BULBOUS TIP,W/O VENT: Brand: MEDLINE

## (undated) DEVICE — BULB SYRINGE, IRRIGATION WITH PROTECTIVE CAP, 60 CC, INDIVIDUALLY WRAPPED: Brand: DOVER

## (undated) DEVICE — NEEDLE HYPO 22GA L1.5IN BLK S STL HUB POLYPR SHLD REG BVL

## (undated) DEVICE — DRAPE,EENT,SPLIT,STERILE: Brand: MEDLINE

## (undated) DEVICE — STRAP,POSITIONING,KNEE/BODY,FOAM,4X60": Brand: MEDLINE

## (undated) DEVICE — TUBING, SUCTION, 1/4" X 12', STRAIGHT: Brand: MEDLINE

## (undated) DEVICE — REM POLYHESIVE ADULT PATIENT RETURN ELECTRODE: Brand: VALLEYLAB

## (undated) DEVICE — SYR 10ML LUER LOK 1/5ML GRAD --

## (undated) DEVICE — Device